# Patient Record
Sex: FEMALE | Race: WHITE | NOT HISPANIC OR LATINO | Employment: OTHER | URBAN - METROPOLITAN AREA
[De-identification: names, ages, dates, MRNs, and addresses within clinical notes are randomized per-mention and may not be internally consistent; named-entity substitution may affect disease eponyms.]

---

## 2017-01-04 ENCOUNTER — ANTICOAGULATION MONITORING (OUTPATIENT)
Dept: VASCULAR LAB | Facility: MEDICAL CENTER | Age: 77
End: 2017-01-04

## 2017-01-04 DIAGNOSIS — Z79.01 CHRONIC ANTICOAGULATION: ICD-10-CM

## 2017-01-04 LAB
INR PPP: 2.2 (ref 2–3.5)
INR PPP: 2.2 (ref 2–3.5)

## 2017-01-04 NOTE — PROGRESS NOTES
OP Anticoagulation Telephone Note    Date: 1/4/2017  Anticoagulation Summary as of 1/4/2017     INR goal 2.0-3.0   Selected INR 2.2 (1/4/2017)   Maintenance plan 3.75 mg (2.5 mg x 1.5) on Thu; 2.5 mg (2.5 mg x 1) all other days   Weekly total 18.75 mg   No change documented Roxana Mercedes, Med Ass't   Plan last modified Massiel Coleman, PHARMD (12/22/2016)   Next INR check 1/18/2017   Target end date Indefinite    Indications   Atrial fibrillation (HCC) (Resolved) [I48.91]  Chronic anticoagulation [Z79.01]         Anticoagulation Episode Summary     INR check location Home Draw    Preferred lab     Send INR reminders to     Bria Monteor       Anticoagulation Care Providers     Provider Role Specialty Phone number    Lizzy Haywood M.D. Referring Cardiology [phone]    [facility] Anticoagulation Services Responsible  [phone]    VIOLET FloresD Responsible          Anticoagulation Patient Findings   Negatives Missed Doses, Extra Doses, Medication Changes, Antibiotic Use, Diet Changes, Dental/Other Procedures, Hospitalization, Bleeding Gums, Nose Bleeds, Blood in Urine, Blood in Stool, Any Bruising, Other Complaints      Plan:  Spoke with patient on the phone. Patient is therapeutic today. Patient denies any changes in medications or diet. Patient denies any signs or symptoms of bleeding or clotting. Instructed patient to call clinic if any unusual bleeding or bruising occurs. Will continue dosing as outlined above. Will follow-up with patient in 2 weeks.    Roxana Mercedes, Medical Assistant    I have reviewed and agree with the plan above on 01/04/2017      Massiel Coleman, VIOLETD

## 2017-01-14 DIAGNOSIS — I48.20 CHRONIC ATRIAL FIBRILLATION (HCC): ICD-10-CM

## 2017-01-16 RX ORDER — SERTRALINE HYDROCHLORIDE 25 MG/1
TABLET, FILM COATED ORAL
Qty: 90 TAB | Refills: 1 | Status: SHIPPED | OUTPATIENT
Start: 2017-01-16 | End: 2017-12-05 | Stop reason: SDUPTHER

## 2017-01-16 RX ORDER — DIGOXIN 125 MCG
TABLET ORAL
Qty: 90 TAB | Refills: 3 | Status: SHIPPED | OUTPATIENT
Start: 2017-01-16 | End: 2017-12-05 | Stop reason: SDUPTHER

## 2017-01-18 ENCOUNTER — ANTICOAGULATION MONITORING (OUTPATIENT)
Dept: VASCULAR LAB | Facility: MEDICAL CENTER | Age: 77
End: 2017-01-18

## 2017-01-18 DIAGNOSIS — Z79.01 CHRONIC ANTICOAGULATION: ICD-10-CM

## 2017-01-18 LAB — INR PPP: 1.9 (ref 2–3.5)

## 2017-01-18 NOTE — PROGRESS NOTES
Anticoagulation Summary as of 1/18/2017     INR goal 2.0-3.0   Selected INR 1.9! (1/18/2017)   Maintenance plan 3.75 mg (2.5 mg x 1.5) on Thu; 2.5 mg (2.5 mg x 1) all other days   Weekly total 18.75 mg   Plan last modified VIOLET BarriosD (12/22/2016)   Next INR check 2/1/2017   Target end date Indefinite    Indications   Atrial fibrillation (CMS-HCC) (Resolved) [I48.91]  Chronic anticoagulation [Z79.01]         Anticoagulation Episode Summary     INR check location Home Draw    Preferred lab     Send INR reminders to     Bria Montero       Anticoagulation Care Providers     Provider Role Specialty Phone number    Lizzy Haywood M.D. Referring Cardiology [phone]    [facility] Anticoagulation Services Responsible  [phone]    Vladimir Taylor, VIOLETD Responsible          Anticoagulation Patient Findings    Spoke with patient today regarding subtherapeutic INR of 1.9.  Patient denies any signs/symptoms of bruising or bleeding or any changes in diet and medications.  Instructed patient to call clinic with any questions or concerns.  Instructed patient to bolus with 3.75mg X 1, then resume current warfarin regimen.  Follow up in 2 weeks.    Vladimir Taylor, GILMA

## 2017-01-27 ENCOUNTER — TELEPHONE (OUTPATIENT)
Dept: CARDIOLOGY | Facility: MEDICAL CENTER | Age: 77
End: 2017-01-27

## 2017-01-27 NOTE — TELEPHONE ENCOUNTER
Called patient regarding her MyChart message (below). Patient states that her feet and legs are swollen, but they have been that way for a long time and the swelling has not changed.    She is mostly concerned because her toes on both feet started tingling last night. She states that she doesn't have any nerve problems that she is aware of. Patient would like Dr. Haywood's advice regarding what she should do about the tingling.    Forwarded to Dr. Haywood, please advise. Thank you.    ENOCH RN  ========================================================  ----- Message -----      From: Donte Magaña      Sent: 1/27/2017   9:10 AM        To: Gita Roldan   Subject: Non-Urgent Medical Question                       WATER RETENTION   My feet and legs are swollen and tingling. I took 80 mg of Lasix at 5 AM.   How much and how often can I take more Lasix ?     Thanks   Donte Magaña   [phone]

## 2017-01-27 NOTE — TELEPHONE ENCOUNTER
If the swelling is chronic - ok to take an extra x1 lasix and elevate her feet today  If still bothersome - she can come in for abis next week    Hope that helps    Darren SANCHEZ

## 2017-01-30 RX ORDER — ESTRADIOL 2 MG/1
TABLET ORAL
Qty: 90 TAB | Refills: 1 | Status: SHIPPED | OUTPATIENT
Start: 2017-01-30 | End: 2017-07-29 | Stop reason: SDUPTHER

## 2017-01-30 NOTE — TELEPHONE ENCOUNTER
Requested Prescriptions     Signed Prescriptions Disp Refills   • estradiol (ESTRACE) 2 MG Tab 90 Tab 1     Sig: TAKE ONE TABLET BY MOUTH EVERY DAY     Authorizing Provider: SHELTON FALK     Ordering User: ROSE HILARIO   Patient has upcoming appointment on 2/9/17  VICTORIANO Urias

## 2017-01-31 NOTE — TELEPHONE ENCOUNTER
Called patient for an update. She states that the swelling in her legs and tingling in her toes has subsided. Advised patient to call the office with future concerns.    ENOCH DELGADO

## 2017-02-01 ENCOUNTER — ANTICOAGULATION MONITORING (OUTPATIENT)
Dept: VASCULAR LAB | Facility: MEDICAL CENTER | Age: 77
End: 2017-02-01

## 2017-02-01 DIAGNOSIS — Z79.01 CHRONIC ANTICOAGULATION: ICD-10-CM

## 2017-02-01 LAB — INR PPP: 2.2 (ref 2–3.5)

## 2017-02-01 NOTE — PROGRESS NOTES
Anticoagulation Summary as of 2/1/2017     INR goal 2.0-3.0   Selected INR 2.2 (2/1/2017)   Maintenance plan 3.75 mg (2.5 mg x 1.5) on Thu; 2.5 mg (2.5 mg x 1) all other days   Weekly total 18.75 mg   Plan last modified Massiel Coleman PHARMD (12/22/2016)   Next INR check 2/15/2017   Target end date Indefinite    Indications   Atrial fibrillation (CMS-HCC) (Resolved) [I48.91]  Chronic anticoagulation [Z79.01]         Anticoagulation Episode Summary     INR check location Home Draw    Preferred lab     Send INR reminders to     Bria Montero       Anticoagulation Care Providers     Provider Role Specialty Phone number    Lizzy Haywood M.D. Referring Cardiology [phone]    [facility] Anticoagulation Services Responsible  [phone]    Vladimir Taylor, GILMA Responsible          Anticoagulation Patient Findings    Spoke with patients  today regarding therapeutic INR of 2.2.  Patient denies any signs/symptoms of bruising or bleeding or any changes in diet and medications.  Instructed patient to call clinic with any questions or concerns.  Pt is to continue with current warfarin dosing regimen.  Follow up in 2 weeks.    Vladimir Taylor, GILMA

## 2017-02-09 ENCOUNTER — OFFICE VISIT (OUTPATIENT)
Dept: CARDIOLOGY | Facility: MEDICAL CENTER | Age: 77
End: 2017-02-09
Payer: MEDICARE

## 2017-02-09 VITALS
BODY MASS INDEX: 31.41 KG/M2 | HEART RATE: 116 BPM | WEIGHT: 184 LBS | OXYGEN SATURATION: 94 % | SYSTOLIC BLOOD PRESSURE: 150 MMHG | DIASTOLIC BLOOD PRESSURE: 70 MMHG | HEIGHT: 64 IN

## 2017-02-09 DIAGNOSIS — Z79.01 CHRONIC ANTICOAGULATION: ICD-10-CM

## 2017-02-09 DIAGNOSIS — I25.10 CORONARY ARTERY DISEASE INVOLVING NATIVE CORONARY ARTERY OF NATIVE HEART WITHOUT ANGINA PECTORIS: ICD-10-CM

## 2017-02-09 DIAGNOSIS — I05.1 RHEUMATIC MITRAL REGURGITATION: ICD-10-CM

## 2017-02-09 DIAGNOSIS — I48.21 PERMANENT ATRIAL FIBRILLATION (HCC): ICD-10-CM

## 2017-02-09 PROCEDURE — 1036F TOBACCO NON-USER: CPT | Performed by: INTERNAL MEDICINE

## 2017-02-09 PROCEDURE — 1101F PT FALLS ASSESS-DOCD LE1/YR: CPT | Mod: 8P | Performed by: INTERNAL MEDICINE

## 2017-02-09 PROCEDURE — G8432 DEP SCR NOT DOC, RNG: HCPCS | Performed by: INTERNAL MEDICINE

## 2017-02-09 PROCEDURE — 4040F PNEUMOC VAC/ADMIN/RCVD: CPT | Performed by: INTERNAL MEDICINE

## 2017-02-09 PROCEDURE — G8482 FLU IMMUNIZE ORDER/ADMIN: HCPCS | Performed by: INTERNAL MEDICINE

## 2017-02-09 PROCEDURE — G8419 CALC BMI OUT NRM PARAM NOF/U: HCPCS | Performed by: INTERNAL MEDICINE

## 2017-02-09 PROCEDURE — G8599 NO ASA/ANTIPLAT THER USE RNG: HCPCS | Performed by: INTERNAL MEDICINE

## 2017-02-09 PROCEDURE — 99214 OFFICE O/P EST MOD 30 MIN: CPT | Performed by: INTERNAL MEDICINE

## 2017-02-09 ASSESSMENT — ENCOUNTER SYMPTOMS
PALPITATIONS: 0
DIZZINESS: 0
INSOMNIA: 0
HEARTBURN: 0
EYES NEGATIVE: 1
MYALGIAS: 0
COUGH: 0
WEIGHT LOSS: 0
BRUISES/BLEEDS EASILY: 0
NERVOUS/ANXIOUS: 0
NAUSEA: 0
SHORTNESS OF BREATH: 0

## 2017-02-09 NOTE — PROGRESS NOTES
"Subjective:   Donte Magaña is a 76 y.o. female who presents today in follow-up in regards to her valvular heart disease, coronary disease and atrial fibrillation on anticoagulation and rate control    No cardiac complaints today, she does have some edema and it is better with an extra 80 mg of Lasix only notices that she has had one episode of atrial fibrillation, heart rates and blood pressures have been excellent    Past Medical History   Diagnosis Date   • Heart rate fast    • Backpain    • Hypertension    • Hypothyroid    • Glaucoma 5/3/2011   • High risk medication use 1/12/2012   • Anticoagulant long-term use 1/12/2012   • Obesity 1/12/2012   • Menopause 1/12/2012   • Indigestion    • Unspecified urinary incontinence    • Pulmonary hypertension (CMS-HCC) 10/30/2012   • Hematoma complicating a procedure 11/3/2012   • Unspecified disorder of thyroid      hypothyroidism   • Pneumonia feb,2013   • High cholesterol    • PVC's (premature ventricular contractions) 1/12/2012   • Anesthesia      \"Tachycardia for 5 days after cataract surgery\"   • Arthritis      Knees, hips   • Depression    • Macular degeneration    • Lupus (CMS-[facility])    • CAD (coronary artery disease)    • Spinal stenosis of lumbar region at multiple levels    • Bronchitis Nov, 2013   • Unspecified cataract      repaired bilateral   • Senile nuclear sclerosis    • Mitral regurgitation 10/30/2012   • A-fib (CMS-[facility])    • Asthma      with pneumonia, nothing for 3 years   • Breath shortness      with exertion O2  2l/m PRN, hasnt used for 3 years     Past Surgical History   Procedure Laterality Date   • Tonsillectomy and adenoidectomy     • Recovery  11/30/2010     Performed by SURGERY, CATH-RECOVERY at [facility]   • Colonoscopy  2008     Normal    [facility]   • Abdominal hysterectomy total  April 15,1975     still has ovaries   • Other       Removed pins from left ankle   • Cataract phaco with iol  4/21/2015     Performed by " "Dmitry Bejarano M.D. at [facility]   • Cataract phaco with iol Right 5/5/2015     Procedure: IOL OD - STANDARD;  Surgeon: Dmitry Bejarano M.D.;  Location: [facility];  Service:    • Open reduction       left ankle   • Knee arthroplasty total Left 5/28/2015     Procedure: KNEE ARTHROPLASTY TOTAL;  Surgeon: Heriberto Lozada M.D.;  Location: [facility];  Service:    • Knee arthroplasty total Right 6/23/2016     Procedure: KNEE ARTHROPLASTY TOTAL;  Surgeon: Heriberto Lozada M.D.;  Location: [facility];  Service:      Family History   Problem Relation Age of Onset   • Stroke Mother    • Diabetes Father    • Stroke Sister    • Heart Disease Brother    • Stroke Sister    • GI Daughter      Crohn's Disease   • Heart Disease Daughter      CHF   • Other Daughter      Chronic Pain--Lymphedema   • Cancer Paternal Aunt      History   Smoking status   • Never Smoker    Smokeless tobacco   • Never Used     Allergies   Allergen Reactions   • Amiodarone Hives     Throat and tongue itching   • Atorvastatin Calcium-Polysorbate 80      Muscle aches     • Augmentin      Unknown reaction   • Bactrim Shortness of Breath   • Cipro Xr Swelling   • Claritin-D [Loratadine-Pseudoephedrine] Palpitations   • Clotrimazole      Stinging / burning on chest   • Diltiazem      rash   • Flecainide      dizziness   • Keflex Unspecified     Pt states \"Unsure\".   • Lipitor [Atorvastatin Calcium]      Muscle aches   • Metoprolol      Causes throat swelling   • Morphine      Hallucinations   • Mucinex      GI Distress     • Qvar [Beclomethasone Dipropionate]      Pressure on heart     • Tape Rash     Paper tape okay   • Tramadol      crying   • Vibramycin Shortness of Breath     Outpatient Encounter Prescriptions as of 2/9/2017   Medication Sig Dispense Refill   • estradiol (ESTRACE) 2 MG Tab TAKE ONE TABLET BY MOUTH EVERY DAY 90 Tab 1   • digoxin (LANOXIN) 125 MCG Tab TAKE ONE TABLET BY MOUTH EVERY " DAY 90 Tab 3   • sertraline (ZOLOFT) 25 MG tablet TAKE ONE TABLET BY MOUTH EVERY DAY 90 Tab 1   • verapamil (ISOPTIN) 120 MG Tab Take 1 Tab by mouth See Admin Instructions. Take 240 mg every morning, take 180 mg at noon, take 180 mg at 8pm 450 Tab 2   • ranitidine (ZANTAC) 150 MG Tab Take 1 Tab by mouth 2 times a day. 180 Tab 3   • Multiple Vitamins-Minerals (ICAPS AREDS 2) Cap Take 1 Cap by mouth every day. (Patient taking differently: Take 1 Cap by mouth every day. Pt takes the chewable tabs) 100 Cap 3   • potassium chloride SA (K-DUR) 20 MEQ Tab CR Take 1 Tab by mouth 2 times a day. 180 Tab 3   • lisinopril (PRINIVIL) 5 MG Tab Take 1 Tab by mouth every day. 90 Tab 3   • albuterol 108 (90 BASE) MCG/ACT Aero Soln inhalation aerosol Inhale 2 Puffs by mouth every 6 hours as needed for Shortness of Breath.     • fexofenadine (ALLEGRA) 60 MG Tab Take 60 mg by mouth as needed. Indications: Hayfever     • furosemide (LASIX) 40 MG Tab Take 80 mg by mouth every day.     • levothyroxine (SYNTHROID) 88 MCG Tab Take 88 mcg by mouth Every morning on an empty stomach.     • docusate sodium (COLACE) 100 MG Cap Take 200 mg by mouth every evening.     • Diclofenac Sodium 1 % Gel Apply 2 g to affected area(s) as needed (For joint pain).     • albuterol (PROVENTIL) 2.5mg/3ml Nebu Soln solution for nebulization 2.5 mg by Nebulization route every four hours as needed for Shortness of Breath.     • acetaminophen (TYLENOL) 500 MG TABS Take 1,000 mg by mouth 3 times a day. Indications: Pain     • MAGOX 400 400 (241.3 MG) MG TABS tablet Take 400 mg by mouth every day.  3   • sennosides-docusate sodium (SENOKOT-S) 8.6-50 MG tablet Take 3 Tabs by mouth every day. 30 Tab    • vitamin D (CHOLECALCIFEROL) 1000 UNIT TABS Take 2,000 Units by mouth every day.       No facility-administered encounter medications on file as of 2/9/2017.     Review of Systems   Constitutional: Negative for weight loss and malaise/fatigue.   Eyes: Negative.   "  Respiratory: Negative for cough and shortness of breath.    Cardiovascular: Positive for leg swelling (very mild bilaterally). Negative for chest pain and palpitations.   Gastrointestinal: Negative for heartburn and nausea.   Musculoskeletal: Positive for joint pain. Negative for myalgias.        Still getting injections, no setbacks with her knees   Neurological: Negative for dizziness.   Endo/Heme/Allergies: Does not bruise/bleed easily.   Psychiatric/Behavioral: The patient is not nervous/anxious and does not have insomnia.    All other systems reviewed and are negative.       Objective:   /70 mmHg  Pulse 116  Ht 1.626 m (5' 4\")  Wt 83.462 kg (184 lb)  BMI 31.57 kg/m2  SpO2 94%  LMP 01/09/1975    Physical Exam   Constitutional: She is oriented to person, place, and time. She appears well-developed.   HENT:   Mouth/Throat: Mucous membranes are normal.   Eyes: Conjunctivae and EOM are normal.   Neck: No JVD present. No thyroid mass present.   Cardiovascular: Normal rate, regular rhythm and intact distal pulses.    Murmur heard.  2/6 systolic blowing murmur;  heart rate 85 on my exam   Pulmonary/Chest: Effort normal and breath sounds normal.   Musculoskeletal: Normal range of motion. She exhibits no edema.   Neurological: She is alert and oriented to person, place, and time. She has normal strength. She displays no tremor.   Skin: Skin is warm and dry.   Psychiatric: She has a normal mood and affect. Her behavior is normal.   Vitals reviewed.      Assessment:     1. Rheumatic mitral regurgitation     2. Chronic anticoagulation     3. Coronary artery disease involving native coronary artery of native heart without angina pectoris  Echocardiogram Comp w/o Cont   4. Permanent atrial fibrillation (CMS-[facility])         Medical Decision Making:  Today's Assessment / Status / Plan:     We reviewed her complex hospital chart, she has coronary disease that we've been managing medically because of her high " functionality and lack of symptoms. Normal nuclear test last year. We looked at her echocardiogram from 2 years ago     mild mitral regurgitation as above. Echo this summer    Atrial fibrillation, systemic anticoagulation, no history of stroke polypharmacy and multiple drug sensitivities, as usually spent more than 20 minutes going over her labs. Her blood pressure log is quite good although it is slightly high today RTC 6 months with echo and visit

## 2017-02-09 NOTE — MR AVS SNAPSHOT
"        Donte Magaña   2017 11:00 AM   Office Visit   MRN: [id number]    Department:  [facility]   Dept Phone:  [phone]    Description:  Female : 1940   Provider:  Lizzy Haywood M.D.           Reason for Visit     Follow-Up           Allergies as of 2017     Allergen Noted Reactions    Amiodarone 2013   Hives    Throat and tongue itching    Atorvastatin Calcium-Polysorbate 80 2015       Muscle aches      Augmentin 2008       Unknown reaction    Bactrim 2013   Shortness of Breath    Cipro Xr 2009   Swelling    Claritin-D [Loratadine-Pseudoephedrine] 2008   Palpitations    Clotrimazole 2013       Stinging / burning on chest    Diltiazem 10/07/2010       rash    Flecainide 2012       dizziness    Keflex 2008   Unspecified    Pt states \"Unsure\".    Lipitor [Atorvastatin Calcium] 2013       Muscle aches    Metoprolol 10/26/2012       Causes throat swelling    Morphine 2008       Hallucinations    Mucinex 02/15/2013       GI Distress      Qvar [Beclomethasone Dipropionate] 2013       Pressure on heart      Tape 2015   Rash    Paper tape okay    Tramadol 2015       crying    Vibramycin 2013   Shortness of Breath      You were diagnosed with     Rheumatic mitral regurgitation   [499528]       Chronic anticoagulation   [410061]       Coronary artery disease involving native coronary artery of native heart without angina pectoris   [1537518]       Permanent atrial fibrillation (CMS-[facility])   [298093]         Vital Signs     Blood Pressure Pulse Height Weight Body Mass Index Oxygen Saturation    150/70 mmHg 116 1.626 m (5' 4\") 83.462 kg (184 lb) 31.57 kg/m2 94%    Last Menstrual Period Smoking Status                1975 Never Smoker           Basic Information     Date Of Birth Sex Race Ethnicity Preferred Language    1940 Female White Non- English      Your appointments     Mar 08, 2017 "  3:00 PM   ANNUAL WELLNESS with Kishore Price M.D., [facility]    [facility] ([place])    [address] [id number] [phone]              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   5/3/2011 - Present    Glaucoma H40.9 Low  5/3/2011 - Present    Macular degeneration H35.30 Low  5/3/2011 - Present    PVC's (premature ventricular contractions) (Chronic) I49.3 Low  1/12/2012 - Present    Menopause Z78.0   1/12/2012 - Present    Personal history of actinic keratosis Z87.2   5/15/2012 - Present    Mitral regurgitation I34.0 Medium  10/30/2012 - Present    HTN (hypertension) I10 Low  2/16/2013 - Present    Hypothyroid E03.9 Low  2/16/2013 - Present    Chondromalacia patellae of left knee    7/2/2013 - Present    Spinal stenosis, multilevel M48.00 Medium  7/2/2013 - Present    Chronic anticoagulation Z79.01 High  11/14/2013 - Present    Hypothyroidism E03.9 Low  5/21/2014 - Present    CAD (coronary artery disease) I25.10 Medium  10/7/2014 - Present    Senile nuclear sclerosis H25.10   5/5/2015 - Present    Osteoarthrosis involving lower leg M17.9   5/28/2015 - Present    Depression F32.9 Low  7/30/2015 - Present    Pulmonary hypertension (CMS-HCC) I27.2   9/16/2015 - Present    Degenerative arthritis of knee, bilateral M17.0 High  6/23/2016 - Present    Permanent atrial fibrillation (CMS-HCC) I48.2   10/17/2016 - Present      Health Maintenance        Date Due Completion Dates    COLONOSCOPY 9/28/2018 (Originally 9/29/2015) 9/29/2005    IMM ZOSTER VACCINE 9/28/2018 (Originally 11/16/1998) ---    MAMMOGRAM 12/7/2017 12/7/2016, 11/12/2015, 11/10/2014, 10/14/2013, 5/30/2012, 5/27/2011, 5/7/2010, 5/7/2010, 3/16/2009, 3/16/2009, 3/10/2008, 2/28/2007, 2/27/2006, 2/25/2005    BONE DENSITY  5/11/2020 5/11/2015, 4/4/2013    IMM DTaP/Tdap/Td Vaccine (2 - Td) 5/15/2022 5/15/2012            Current Immunizations     13-VALENT PCV PREVNAR 11/3/2015    Influenza TIV (IM) 10/9/2014, 10/28/2012  9:06 PM, 10/15/2011, 10/3/2010  2:06 PM    Influenza Vaccine Adult HD 9/10/2016, 9/8/2015    Influenza Vaccine Quad Inj (Preserved) 10/8/2013    Pneumococcal polysaccharide vaccine (PPSV-23) 10/15/2009    Tdap Vaccine 5/15/2012      Below and/or attached are the medications your provider expects you to take. Review all of your home medications and newly ordered medications with your provider and/or pharmacist. Follow medication instructions as directed by your provider and/or pharmacist. Please keep your medication list with you and share with your provider. Update the information when medications are discontinued, doses are changed, or new medications (including over-the-counter products) are added; and carry medication information at all times in the event of emergency situations     Allergies:  AMIODARONE - Hives     ATORVASTATIN CALCIUM-POLYSORBATE 80 - (reactions not documented)     AUGMENTIN - (reactions not documented)     BACTRIM - Shortness of Breath     CIPRO XR - Swelling     CLARITIN-D - Palpitations     CLOTRIMAZOLE - (reactions not documented)     DILTIAZEM - (reactions not documented)     FLECAINIDE - (reactions not documented)     KEFLEX - Unspecified     LIPITOR - (reactions not documented)     METOPROLOL - (reactions not documented)     MORPHINE - (reactions not documented)     MUCINEX - (reactions not documented)     QVAR - (reactions not documented)     TAPE - Rash     TRAMADOL - (reactions not documented)     VIBRAMYCIN - Shortness of Breath               Medications  Valid as of: February 09, 2017 - 11:43 AM    Generic Name Brand Name Tablet Size Instructions for use    Acetaminophen (Tab) TYLENOL 500 MG Take 1,000 mg by mouth 3 times a day. Indications: Pain        Albuterol Sulfate (Aero Soln)  albuterol 108 (90 BASE) MCG/ACT Inhale 2 Puffs by mouth every 6 hours as needed for Shortness of Breath.        Albuterol Sulfate (Nebu Soln) PROVENTIL 2.5mg/3ml 2.5 mg by Nebulization route every four hours as needed for Shortness of Breath.        Cholecalciferol (Tab) cholecalciferol 1000 UNIT Take 2,000 Units by mouth every day.        Diclofenac Sodium (Gel) Diclofenac Sodium 1 % Apply 2 g to affected area(s) as needed (For joint pain).        Digoxin (Tab) LANOXIN 125 MCG TAKE ONE TABLET BY MOUTH EVERY DAY        Docusate Sodium (Cap) COLACE 100 MG Take 200 mg by mouth every evening.        Estradiol (Tab) ESTRACE 2 MG TAKE ONE TABLET BY MOUTH EVERY DAY        Fexofenadine HCl (Tab) ALLEGRA 60 MG Take 60 mg by mouth as needed. Indications: Hayfever        Furosemide (Tab) LASIX 40 MG Take 80 mg by mouth every day.        Levothyroxine Sodium (Tab) SYNTHROID 88 MCG Take 88 mcg by mouth Every morning on an empty stomach.        Lisinopril (Tab) PRINIVIL 5 MG Take 1 Tab by mouth every day.        Magnesium Oxide (Tab) MAGOX 400 400 (241.3 MG) MG Take 400 mg by mouth every day.        Multiple Vitamins-Minerals (Cap) ICAPS AREDS 2  Take 1 Cap by mouth every day.        Potassium Chloride Celina CR (Tab CR) Kdur 20 MEQ Take 1 Tab by mouth 2 times a day.        RaNITidine HCl (Tab) ZANTAC 150 MG Take 1 Tab by mouth 2 times a day.        Sennosides-Docusate Sodium (Tab) SENOKOT-S 8.6-50 MG Take 3 Tabs by mouth every day.        Sertraline HCl (Tab) ZOLOFT 25 MG TAKE ONE TABLET BY MOUTH EVERY DAY        Verapamil HCl (Tab) ISOPTIN 120 MG Take 1 Tab by mouth See Admin Instructions. Take 240 mg every morning, take 180 mg at noon, take 180 mg at 8pm        .                 Medicines prescribed today were sent to:     [id number] - [place], [place] - [address]    [address] [place] [address]    Phone: [phone] Fax: [phone]    Open 24 Hours?: No      Medication refill instructions:       If your  prescription bottle indicates you have medication refills left, it is not necessary to call your provider’s office. Please contact your pharmacy and they will refill your medication.    If your prescription bottle indicates you do not have any refills left, you may request refills at any time through one of the following ways: The online [employer] system (except Urgent Care), by calling your provider’s office, or by asking your pharmacy to contact your provider’s office with a refill request. Medication refills are processed only during regular business hours and may not be available until the next business day. Your provider may request additional information or to have a follow-up visit with you prior to refilling your medication.   *Please Note: Medication refills are assigned a new Rx number when refilled electronically. Your pharmacy may indicate that no refills were authorized even though a new prescription for the same medication is available at the pharmacy. Please request the medicine by name with the pharmacy before contacting your provider for a refill.        Your To Do List     Future Labs/Procedures Complete By Expires    Echocardiogram Comp w/o Cont  As directed 2/9/2018      Other Notes About Your Plan     Donte Alicea is an -MedTe patient of Dr. Price  Please Assess the following diagnosis:    -this patient is on chronic Coumadin therapy for treatment of atrial fib. ? Coagulation defect, code 286.9    -per OV of 6/25/15, patient was started on Zoloft for treatment of depression. ? Major depressive disorder, single episode, unspecified degree, code 296.20             MyChart Access Code: Activation code not generated  [facility]: Active

## 2017-02-15 ENCOUNTER — ANTICOAGULATION MONITORING (OUTPATIENT)
Dept: VASCULAR LAB | Facility: MEDICAL CENTER | Age: 77
End: 2017-02-15

## 2017-02-15 DIAGNOSIS — Z79.01 CHRONIC ANTICOAGULATION: ICD-10-CM

## 2017-02-15 LAB — INR PPP: 2.5 (ref 2–3.5)

## 2017-02-15 NOTE — PROGRESS NOTES
Anticoagulation Summary as of 2/15/2017     INR goal 2.0-3.0   Selected INR 2.5 (2/15/2017)   Maintenance plan 3.75 mg (2.5 mg x 1.5) on Thu; 2.5 mg (2.5 mg x 1) all other days   Weekly total 18.75 mg   Plan last modified VIOLET BarriosD (12/22/2016)   Next INR check 3/1/2017   Target end date Indefinite    Indications   Atrial fibrillation (CMS-HCC) (Resolved) [I48.91]  Chronic anticoagulation [Z79.01]         Anticoagulation Episode Summary     INR check location Home Draw    Preferred lab     Send INR reminders to     Bria Montero       Anticoagulation Care Providers     Provider Role Specialty Phone number    Lizzy Haywood M.D. Referring Cardiology [phone]    [facility]ticoagulation [facility] Responsible  [phone]    Vladimir Taylor, VIOLETD Responsible          Anticoagulation Patient Findings    Spoke with patient today regarding therapeutic INR of 2.5.  Patient denies any signs/symptoms of bruising or bleeding or any changes in diet and medications.  Instructed patient to call clinic with any questions or concerns.  Pt is to continue with current warfarin dosing regimen.  Follow up in 2 weeks.    Vladimir Taylor, VIOLETD

## 2017-02-24 DIAGNOSIS — Z79.01 CHRONIC ANTICOAGULATION: ICD-10-CM

## 2017-03-01 ENCOUNTER — ANTICOAGULATION MONITORING (OUTPATIENT)
Dept: VASCULAR LAB | Facility: MEDICAL CENTER | Age: 77
End: 2017-03-01

## 2017-03-01 DIAGNOSIS — Z79.01 CHRONIC ANTICOAGULATION: ICD-10-CM

## 2017-03-01 LAB — INR PPP: 2.3 (ref 2–3.5)

## 2017-03-01 NOTE — PROGRESS NOTES
Anticoagulation Summary as of 3/1/2017     INR goal 2.0-3.0   Selected INR 2.3 (3/1/2017)   Maintenance plan 3.75 mg (2.5 mg x 1.5) on Thu; 2.5 mg (2.5 mg x 1) all other days   Weekly total 18.75 mg   Plan last modified Massiel Coleman PHARMD (12/22/2016)   Next INR check 3/15/2017   Target end date Indefinite    Indications   Atrial fibrillation (CMS-HCC) (Resolved) [I48.91]  Chronic anticoagulation [Z79.01]         Anticoagulation Episode Summary     INR check location Home Draw    Preferred lab     Send INR reminders to     Bria Montero       Anticoagulation Care Providers     Provider Role Specialty Phone number    Lizzy Haywood M.D. Referring Cardiology [phone]    [facility] Responsible  [phone]    Vladimir Taylor, GILMA Responsible          Anticoagulation Patient Findings    Spoke with patients  Woody today regarding therapeutic INR of 2.3.  Patient denies any signs/symptoms of bruising or bleeding or any changes in diet and medications.  Instructed patient to call clinic with any questions or concerns.  Pt is to continue with current warfarin dosing regimen.  Follow up in 2 weeks.    Vladimir Taylor, GILMA

## 2017-03-06 ENCOUNTER — TELEPHONE (OUTPATIENT)
Dept: MEDICAL GROUP | Facility: PHYSICIAN GROUP | Age: 77
End: 2017-03-06

## 2017-03-06 NOTE — TELEPHONE ENCOUNTER
ANNUAL WELLNESS VISIT PRE-VISIT PLANNING     1.  Reviewed last PCP office visit assessment and plan notes: Yes    2.  If any orders were placed last visit do we have Results/Consult Notes?        •  Labs? Yes NOT COMPLETE       •  Imaging? No        •  Referrals? Yes 01/26/2017 COMPLETE CARDIOLOGY    3.  Patient Care Coordination Note was updated with diagnosis information:  Yes    4.  Patient is due for these Health Maintenance Topics:   Health Maintenance Due   Topic Date Due   • Annual Wellness Visit  1940          •  JOBY letter was faxed to:  NONE    5.  Immunizations were updated in [place] using WebIZ?: Yes       •  Web Iz Recommendations:  NONE       •  Is patient due for Tdap/Shingles? No.  If yes, was patient alerted of copay? NA    6.  Patient has:       •   Diabetes: NO       •   COPD: NO       •   CHF: NO       •   Depression: YES    7.  Updated Care Team with [employer] and all specialists?        •   Gait devices, O2, CPAP, etc: yes (WALKER + CANE)       •   Eye professional: yes       •   Other specialists (GYN, cardiology, endo, etc): N\A    8.  Is patient in need of any refills prior to office visit? No       •    Separate refill encounter created?: N\A    9.  Patient was informed to arrive 15 min prior to their scheduled appointment and bring in their medication bottles? yes    10.  Patient was advised: “This is a free wellness visit. The provider will screen for medical conditions to help you stay healthy. If you have other concerns to address you may be asked to discuss these at a separate visit or there may be an additional fee.”  Yes

## 2017-03-08 ENCOUNTER — OFFICE VISIT (OUTPATIENT)
Dept: MEDICAL GROUP | Facility: PHYSICIAN GROUP | Age: 77
End: 2017-03-08
Payer: MEDICARE

## 2017-03-08 VITALS
DIASTOLIC BLOOD PRESSURE: 60 MMHG | OXYGEN SATURATION: 96 % | TEMPERATURE: 97.5 F | WEIGHT: 180 LBS | HEART RATE: 80 BPM | HEIGHT: 64 IN | BODY MASS INDEX: 30.73 KG/M2 | SYSTOLIC BLOOD PRESSURE: 138 MMHG

## 2017-03-08 DIAGNOSIS — Z79.01 CHRONIC ANTICOAGULATION: ICD-10-CM

## 2017-03-08 DIAGNOSIS — I49.3 PVC'S (PREMATURE VENTRICULAR CONTRACTIONS): Chronic | ICD-10-CM

## 2017-03-08 DIAGNOSIS — M17.0 PRIMARY OSTEOARTHRITIS OF BOTH KNEES: ICD-10-CM

## 2017-03-08 DIAGNOSIS — L30.9 ECZEMA, UNSPECIFIED TYPE: ICD-10-CM

## 2017-03-08 DIAGNOSIS — I25.10 CORONARY ARTERY DISEASE INVOLVING NATIVE CORONARY ARTERY OF NATIVE HEART WITHOUT ANGINA PECTORIS: ICD-10-CM

## 2017-03-08 DIAGNOSIS — E66.9 OBESITY (BMI 30-39.9): ICD-10-CM

## 2017-03-08 PROCEDURE — G8432 DEP SCR NOT DOC, RNG: HCPCS | Performed by: FAMILY MEDICINE

## 2017-03-08 PROCEDURE — 1101F PT FALLS ASSESS-DOCD LE1/YR: CPT | Performed by: FAMILY MEDICINE

## 2017-03-08 PROCEDURE — G8419 CALC BMI OUT NRM PARAM NOF/U: HCPCS | Performed by: FAMILY MEDICINE

## 2017-03-08 PROCEDURE — G0439 PPPS, SUBSEQ VISIT: HCPCS | Performed by: FAMILY MEDICINE

## 2017-03-08 PROCEDURE — G8598 ASA/ANTIPLAT THER USED: HCPCS | Performed by: FAMILY MEDICINE

## 2017-03-08 PROCEDURE — 1036F TOBACCO NON-USER: CPT | Performed by: FAMILY MEDICINE

## 2017-03-08 PROCEDURE — 4040F PNEUMOC VAC/ADMIN/RCVD: CPT | Performed by: FAMILY MEDICINE

## 2017-03-08 PROCEDURE — G8482 FLU IMMUNIZE ORDER/ADMIN: HCPCS | Performed by: FAMILY MEDICINE

## 2017-03-08 RX ORDER — FEXOFENADINE HCL 60 MG/1
60 TABLET, FILM COATED ORAL PRN
Qty: 180 TAB | Refills: 3 | Status: ON HOLD | OUTPATIENT
Start: 2017-03-08 | End: 2017-12-13

## 2017-03-08 RX ORDER — WARFARIN SODIUM 2.5 MG/1
2.5 TABLET ORAL DAILY
COMMUNITY
End: 2017-11-20 | Stop reason: SDUPTHER

## 2017-03-08 ASSESSMENT — PATIENT HEALTH QUESTIONNAIRE - PHQ9: CLINICAL INTERPRETATION OF PHQ2 SCORE: 0

## 2017-03-08 ASSESSMENT — PAIN SCALES - GENERAL: PAINLEVEL: NO PAIN

## 2017-03-08 NOTE — PROGRESS NOTES
Chief Complaint   Patient presents with   • Annual Wellness Visit         HPI:  Donte is a 76 y.o. female here for Medicare Annual Wellness Visit--patient complains of ongoing itching and a rash on her trunk and legs. She like to see an allergist. It looks to me like she has eczema.  The patient is otherwise doing reasonably well. She is working on losing weight and I encouraged her to continue to do so.        Patient Active Problem List    Diagnosis Date Noted   • Degenerative arthritis of knee, bilateral 06/23/2016     Priority: High   • Chronic anticoagulation 11/14/2013     Priority: High   • CAD (coronary artery disease) 10/07/2014     Priority: Medium   • Spinal stenosis, multilevel 07/02/2013     Priority: Medium   • Mitral regurgitation 10/30/2012     Priority: Medium   • Depression 07/30/2015     Priority: Low   • Hypothyroidism 05/21/2014     Priority: Low   • HTN (hypertension) 02/16/2013     Priority: Low   • Hypothyroid 02/16/2013     Priority: Low   • PVC's (premature ventricular contractions) 01/12/2012     Priority: Low   • Glaucoma 05/03/2011     Priority: Low   • Macular degeneration 05/03/2011     Priority: Low   • Lupus (systemic lupus erythematosus) (CMS-HCC) 10/23/2009     Priority: Low   • Mixed hyperlipidemia 10/23/2009     Priority: Low   • Permanent atrial fibrillation (CMS-HCC) 10/17/2016   • Pulmonary hypertension (CMS-HCC) 09/16/2015   • Osteoarthrosis involving lower leg 05/28/2015   • Senile nuclear sclerosis 05/05/2015   • 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/02/2013   • Personal history of actinic keratosis 05/15/2012   • Menopause 01/12/2012   • Eosinophilic disorder 05/03/2011       Current Outpatient Prescriptions   Medication Sig Dispense Refill   • warfarin (COUMADIN) 2.5 MG Tab Take 2.5 mg by mouth every day. Patient reports: Takes one 2.5 mg Sunday Monday Tuesday Wednesday Friday Saturday and on Thursday take 1 1/2 tab     • estradiol (ESTRACE) 2 MG Tab TAKE ONE TABLET BY  MOUTH EVERY DAY 90 Tab 1   • digoxin (LANOXIN) 125 MCG Tab TAKE ONE TABLET BY MOUTH EVERY DAY 90 Tab 3   • sertraline (ZOLOFT) 25 MG tablet TAKE ONE TABLET BY MOUTH EVERY DAY 90 Tab 1   • verapamil (ISOPTIN) 120 MG Tab Take 1 Tab by mouth See Admin Instructions. Take 240 mg every morning, take 180 mg at noon, take 180 mg at 8pm 450 Tab 2   • ranitidine (ZANTAC) 150 MG Tab Take 1 Tab by mouth 2 times a day. 180 Tab 3   • potassium chloride SA (K-DUR) 20 MEQ Tab CR Take 1 Tab by mouth 2 times a day. 180 Tab 3   • lisinopril (PRINIVIL) 5 MG Tab Take 1 Tab by mouth every day. 90 Tab 3   • albuterol 108 (90 BASE) MCG/ACT Aero Soln inhalation aerosol Inhale 2 Puffs by mouth every 6 hours as needed for Shortness of Breath.     • fexofenadine (ALLEGRA) 60 MG Tab Take 60 mg by mouth as needed. Indications: Hayfever     • furosemide (LASIX) 40 MG Tab Take 80 mg by mouth every day.     • levothyroxine (SYNTHROID) 88 MCG Tab Take 88 mcg by mouth Every morning on an empty stomach.     • docusate sodium (COLACE) 100 MG Cap Take 200 mg by mouth every evening.     • Diclofenac Sodium 1 % Gel Apply 2 g to affected area(s) as needed (For joint pain).     • albuterol (PROVENTIL) 2.5mg/3ml Nebu Soln solution for nebulization 2.5 mg by Nebulization route every four hours as needed for Shortness of Breath.     • acetaminophen (TYLENOL) 500 MG TABS Take 1,000 mg by mouth 3 times a day. Indications: Pain     • MAGOX 400 400 (241.3 MG) MG TABS tablet Take 400 mg by mouth every day.  3   • sennosides-docusate sodium (SENOKOT-S) 8.6-50 MG tablet Take 3 Tabs by mouth every day. 30 Tab    • vitamin D (CHOLECALCIFEROL) 1000 UNIT TABS Take 2,000 Units by mouth every day.     • Multiple Vitamins-Minerals (ICAPS AREDS 2) Cap Take 1 Cap by mouth every day. (Patient taking differently: Take 1 Cap by mouth every day. Pt takes the chewable tabs) 100 Cap 3     No current facility-administered medications for this visit.        The patient reports  adherence to this regimen yes    Current supplements as per medication list.   Chronic narcotic pain medicines: no    Allergies: Amiodarone; Atorvastatin calcium-polysorbate 80; Augmentin; Bactrim; Cipro xr; Claritin-d; Clotrimazole; Diltiazem; Flecainide; Keflex; Lipitor; Metoprolol; Morphine; Mucinex; Qvar; Tape; Tramadol; and Vibramycin    Current social contact/activities: go to the [employer],      Is patient current with immunizations?  yes   If no, due for none    She  reports that she has never smoked. She has never used smokeless tobacco. She reports that she does not drink alcohol or use illicit drugs.  Counseling given: Yes        DPA/Advanced Directive:  Patient do not have an advanced directive.  If not on file, instructed to bring in a copy to scan into her chart. If no advanced directive exists, a packet and workshop information was provided    ROS:    Gait: Uses a cane and walker  Ostomy: no   Other tubes: no   Amputations: no   Chronic oxygen use no   Last eye exam march 2017   : Reports incontinence. --Intermittent    Depression Screening    Little interest or pleasure in doing things?  0 - not at all  Feeling down, depressed, or hopeless?  0 - not at all  Patient Health Questionnaire Score: 0    If depressive symptoms identified deferred to follow up visit unless specifically addressed in assessment and plan.    Screening for Cognitive Impairment    Three Minute Recall (banana, sunrise, fence)  2/3 Orange fence grass  Draw clock face with all 12 numbers set to the hand to show 10 minutes past 11 o'clock  1 5/5  Cognitive concerns identified deferred for follow up unless specifically addressed in assessment and plan.    Fall Risk Assessment    Has the patient had two or more falls in the last year or any fall with injury in the last year?  No    Safety Assessment    Throw rugs on floor.  Yes  Handrails on all stairs.  No  Good lighting in all hallways.  Yes  Difficulty hearing.  No  Patient counseled  about all safety risks that were identified.    Functional Assessment ADLs    Are there any barriers preventing you from cooking for yourself or meeting nutritional needs?  No.    Are there any barriers preventing you from driving safely or obtaining transportation?  No.    Are there any barriers preventing you from using a telephone or calling for help?  No.    Are there any barriers preventing you from shopping?  No.    Are there any barriers preventing you from taking care of your own finances?  No.    Are there any barriers preventing you from managing your medications?  No.    Are currently engaging any exercise or physical activity?  No.       Health Maintenance Summary                Annual Wellness Visit Overdue 1940     COLONOSCOPY Postponed 9/28/2018 Originally 9/29/2015. Patient Refused     Done 9/29/2005 AMB REFERRAL TO GI FOR COLONOSCOPY    IMM ZOSTER VACCINE Postponed 9/28/2018 Originally 11/16/1998. Patient Refused    MAMMOGRAM Next Due 12/7/2017      Done 12/7/2016 MA-MAMMO SCREENING BILAT W/TOMOSYNTHESIS W/CAD     Patient has more history with this topic...    BONE DENSITY Next Due 5/11/2020      Done 5/11/2015 DS-BONE DENSITY STUDY (DEXA)     Patient has more history with this topic...    IMM DTaP/Tdap/Td Vaccine Next Due 5/15/2022      Done 5/15/2012 Imm Admin: Tdap Vaccine          Patient Care Team:  Kishore Price M.D. as PCP - General  [facility] Anticoagulation Services  Dmitry Bejarano M.D. as Consulting Physician (Ophthalmology)    Social History   Substance Use Topics   • Smoking status: Never Smoker    • Smokeless tobacco: Never Used   • Alcohol Use: No     Family History   Problem Relation Age of Onset   • Stroke Mother    • Diabetes Father    • Stroke Sister    • Heart Disease Brother    • Stroke Sister    • GI Daughter      Crohn's Disease   • Heart Disease Daughter      CHF   • Other Daughter      Chronic Pain--Lymphedema   • Cancer Paternal Aunt      She  has a past  "medical history of Heart rate fast; Backpain; Hypertension; Hypothyroid; Glaucoma (5/3/2011); High risk medication use (1/12/2012); Anticoagulant long-term use (1/12/2012); Obesity (1/12/2012); Menopause (1/12/2012); Indigestion; Unspecified urinary incontinence; Pulmonary hypertension (CMS-HCC) (10/30/2012); Hematoma complicating a procedure (11/3/2012); Unspecified disorder of thyroid; Pneumonia (feb,2013); High cholesterol; PVC's (premature ventricular contractions) (1/12/2012); Anesthesia; Arthritis; Depression; Macular degeneration; Lupus (CMS-HCC); CAD (coronary artery disease); Spinal stenosis of lumbar region at multiple levels; Bronchitis (Nov, 2013); Unspecified cataract; Senile nuclear sclerosis; Mitral regurgitation (10/30/2012); A-fib (CMS-HCC); Asthma; and Breath shortness.   Past Surgical History   Procedure Laterality Date   • Tonsillectomy and adenoidectomy     • Recovery  11/30/2010     Performed by SURGERY, CATH-RECOVERY at SURGERY SAME DAY [facility]   • Colonoscopy  2008     Normal    [facility]   • Abdominal hysterectomy total  April 15,1975     still has ovaries   • Other       Removed pins from left ankle   • Cataract phaco with iol  4/21/2015     Performed by Dmitry Bejarano M.D. at [facility]   • Cataract phaco with iol Right 5/5/2015     Procedure: IOL OD - STANDARD;  Surgeon: Dmitry Bejarano M.D.;  Location: [facility];  Service:    • Open reduction       left ankle   • Knee arthroplasty total Left 5/28/2015     Procedure: KNEE ARTHROPLASTY TOTAL;  Surgeon: Heriberto Lozada M.D.;  Location: [facility];  Service:    • Knee arthroplasty total Right 6/23/2016     Procedure: KNEE ARTHROPLASTY TOTAL;  Surgeon: Heriberto Lozada M.D.;  Location: [facility];  Service:            Exam:     Blood pressure 138/60, pulse 80, temperature 36.4 °C (97.5 °F), height 1.626 m (5' 4.02\"), weight 81.647 kg (180 lb), last menstrual period " 01/09/1975, SpO2 96 %, not currently breastfeeding. Body mass index is 30.88 kg/(m^2).    Hearing good.    Dentition good  Alert, oriented in no acute distress.  Eye contact is good, speech goal directed, affect calm    Physical Exam   Constitutional: She is oriented. She appears well-developed and well-nourished. No distress.   HENT:  Head: Normocephalic and atraumatic.   Right Ear: External ear normal. Ear canal and TM normal   Left Ear: External ear normal. Ear canal and TM normal  Nose: Nose normal.   Mouth/Throat: Oropharynx is clear and moist.   Eyes: Conjunctivae and extraocular motions are normal. Pupils are equal, round, and reactive to light. Fundi benign bilaterally   Neck: No thyromegaly present.   Cardiovascular: Normal rate, regular rhythm, normal heart sounds and intact distal pulses.  Exam reveals no gallop.    No murmur heard.  Pulmonary/Chest: Effort normal and breath sounds normal. No respiratory distress. She has no wheezes. She has no rales.   Abdominal: Soft. Bowel sounds are normal. No hepatosplenomegaly She exhibits no distension. No tenderness. She has no rebound and no guarding.   Musculoskeletal: Normal range of motion. She exhibits no edema and no tenderness.   Lymphadenopathy:     She has no cervical adenopathy.   No supraclavicular adenopathy  Neurological: She is alert and oriented. She has normal reflexes.        Babinskis downgoing bilaterally   Skin: Skin is warm and dry. She has what appears to be eczema over her trunk and legs and arms  Psychiatric: She has a normal mood and appropriate affect. Her behavior is normal. Judgment and thought content normal.     Assessment and Plan. The following treatment and monitoring plan is recommended:    1. Eczema, unspecified type  fexofenadine (ALLEGRA) 60 MG Tab--one by mouth twice a day     REFERRAL TO ALLERGY--Dr. Redd    2. Chronic anticoagulation   doing well.    3. Primary osteoarthritis of both knees   stable    4. Coronary artery  disease involving native coronary artery of native heart without angina pectoris   stable    5. PVC's (premature ventricular contractions)   asymptomatic    6. Obesity (BMI 30-39.9)  Patient identified as having weight management issue.  Appropriate orders and counseling given.           Services needed: No services needed at this time  Health Care Screening recommendations as per orders if indicated.  Referrals offered: PT/OT/Nutrition counseling/Behavioral Health/Smoking cessation as per orders if indicated.    Discussion today about general wellness and lifestyle habits:    · Prevent falls and reduce trip hazards; Cautioned about securing or removing rugs.  · Have a working fire alarm and carbon monoxide detector;   · Engage in regular physical activity and social activities   · Stay healthy and active (goals)      Follow-up: Recheck one year or when necessary    Please note that this dictation was created using voice recognition software. I have worked with consultants from the vendor as well as technical experts from [facility] [employer] to optimize the interface. I have made every reasonable attempt to correct obvious errors, but I expect that there are errors of grammar and possibly content that I did not discover before finalizing the note.

## 2017-03-09 NOTE — PATIENT INSTRUCTIONS
Patient given written instructions regarding labs, imaging, medications, referrals, dietary and lifestyle management, and return visit.    Kishore Price MD

## 2017-03-15 ENCOUNTER — ANTICOAGULATION MONITORING (OUTPATIENT)
Dept: VASCULAR LAB | Facility: MEDICAL CENTER | Age: 77
End: 2017-03-15

## 2017-03-15 DIAGNOSIS — Z79.01 CHRONIC ANTICOAGULATION: ICD-10-CM

## 2017-03-15 LAB — INR PPP: 2.3 (ref 2–3.5)

## 2017-03-15 NOTE — PROGRESS NOTES
OP Anticoagulation Telephone Note    Date: 3/15/2017  Anticoagulation Summary as of 3/15/2017     INR goal 2.0-3.0   Selected INR 2.3 (3/15/2017)   Maintenance plan 3.75 mg (2.5 mg x 1.5) on Thu; 2.5 mg (2.5 mg x 1) all other days   Weekly total 18.75 mg   No change documented Linus Cooley Ass't   Plan last modified PETER BarriosD (12/22/2016)   Next INR check 3/29/2017   Target end date Indefinite    Indications   Atrial fibrillation (CMS-HCC) (Resolved) [I48.91]  Chronic anticoagulation [Z79.01]         Anticoagulation Episode Summary     INR check location Home Draw    Preferred lab     Send INR reminders to     Bria Montero       Anticoagulation Care Providers     Provider Role Specialty Phone number    Lizzy Haywood M.D. Referring Cardiology [phone]    [facility] Anticoagulation [facility] Responsible  [phone]    PETER FloresD Responsible          Anticoagulation Patient Findings   Negatives Missed Doses, Extra Doses, Medication Changes, Antibiotic Use, Diet Changes, Dental/Other Procedures, Hospitalization, Bleeding Gums, Nose Bleeds, Blood in Urine, Blood in Stool, Any Bruising, Other Complaints      Plan:  Spoke with patient on the phone. Patient is therapeutic today. Patient denies any changes in medications or diet. Patient denies any signs or symptoms of bleeding or clotting. Instructed patient to call clinic if any unusual bleeding or bruising occurs. Will continue dosing as outlined above. Will follow-up with patient in 2 weeks.    Roxana Mercedes, Medical Assistant    3/24/2017    Concur with plan outlined above    Wayne Cheng, PeterD

## 2017-03-30 LAB — INR PPP: 2 (ref 2–3.5)

## 2017-04-05 ENCOUNTER — ANTICOAGULATION MONITORING (OUTPATIENT)
Dept: VASCULAR LAB | Facility: MEDICAL CENTER | Age: 77
End: 2017-04-05

## 2017-04-05 DIAGNOSIS — Z79.01 CHRONIC ANTICOAGULATION: ICD-10-CM

## 2017-04-05 NOTE — PROGRESS NOTES
Anticoagulation Summary as of 4/5/2017     INR goal 2.0-3.0   Selected INR 2.0 (3/30/2017)   Maintenance plan 3.75 mg (2.5 mg x 1.5) on Thu; 2.5 mg (2.5 mg x 1) all other days   Weekly total 18.75 mg   Plan last modified VIOLET BarriosD (12/22/2016)   Next INR check 4/13/2017   Target end date Indefinite    Indications   Atrial fibrillation (CMS-HCC) (Resolved) [I48.91]  Chronic anticoagulation [Z79.01]         Anticoagulation Episode Summary     INR check location Home Draw    Preferred lab     Send INR reminders to     Bria Montero       Anticoagulation Care Providers     Provider Role Specialty Phone number    Lizzy Haywood M.D. Referring Cardiology [phone]    [facility] Responsible  [phone]    Vladimir Taylor PHARMD Responsible          Anticoagulation Patient Findings   Negatives Missed Doses, Extra Doses, Medication Changes, Antibiotic Use, Diet Changes, Dental/Other Procedures, Hospitalization, Bleeding Gums, Nose Bleeds, Blood in Urine, Blood in Stool, Any Bruising, Other Complaints        Spoke with patient today regarding  therapeutic INR of 2.0.  Patient denies any signs/symptoms of bruising or bleeding or any changes in diet and medications.  Instructed patient to call clinic with any questions or concerns.  Pt is to continue with current warfarin dosing regimen.  Follow up in 2 weeks.    Vladimir Taylor PHARMD

## 2017-04-12 ENCOUNTER — ANTICOAGULATION MONITORING (OUTPATIENT)
Dept: VASCULAR LAB | Facility: MEDICAL CENTER | Age: 77
End: 2017-04-12

## 2017-04-12 DIAGNOSIS — Z79.01 CHRONIC ANTICOAGULATION: ICD-10-CM

## 2017-04-12 LAB — INR PPP: 1.8 (ref 2–3.5)

## 2017-04-12 NOTE — PROGRESS NOTES
Anticoagulation Summary as of 4/12/2017     INR goal 2.0-3.0   Selected INR 1.8! (4/12/2017)   Maintenance plan 3.75 mg (2.5 mg x 1.5) on Thu; 2.5 mg (2.5 mg x 1) all other days   Weekly total 18.75 mg   Plan last vera Coleman, PHARMD (12/22/2016)   Next INR check    Target end date Indefinite    Indications   Atrial fibrillation (CMS-HCC) (Resolved) [I48.91]  Chronic anticoagulation [Z79.01]         Anticoagulation Episode Summary     INR check location Home Draw    Preferred lab     Send INR reminders to     Bria Montero       Anticoagulation Care Providers     Provider Role Specialty Phone number    Lizzy Haywood M.D. Referring Cardiology [phone]    [facility] Anticoagulation Services Responsible  [phone]    PETER FloresD Responsible          Anticoagulation Patient Findings    Spoke to patient on the phone. Patients INR was subtherapeutic today at 1.8 .Patient denied any signs/symptoms of bleeding or bruising. Patient denied any recent changes to medications but she stated she has been eating more cranberry sauce which most likely will increase INR not decrease it. Patient denied any missed doses. Patient was instructed to Take 3.75 mg today only (1.5X2.5mg) then continue regular regimen. Patient has a CHADS-VASc of 5/ CHADS of 3 will not consider bridging at this time . Follow up in 2 week(s).      Ruy Fontaine, Peter.D

## 2017-04-26 ENCOUNTER — ANTICOAGULATION MONITORING (OUTPATIENT)
Dept: VASCULAR LAB | Facility: MEDICAL CENTER | Age: 77
End: 2017-04-26

## 2017-04-26 DIAGNOSIS — Z79.01 CHRONIC ANTICOAGULATION: ICD-10-CM

## 2017-04-26 LAB — INR PPP: 1.2 (ref 2–3.5)

## 2017-04-26 NOTE — PROGRESS NOTES
Anticoagulation Summary as of 4/26/2017     INR goal 2.0-3.0   Selected INR 1.2! (4/26/2017)   Maintenance plan 3.75 mg (2.5 mg x 1.5) on Thu; 2.5 mg (2.5 mg x 1) all other days   Weekly total 18.75 mg   Plan last modified VIOLET BarriosD (12/22/2016)   Next INR check 5/1/2017   Target end date Indefinite    Indications   Atrial fibrillation (CMS-HCC) (Resolved) [I48.91]  Chronic anticoagulation [Z79.01]         Anticoagulation Episode Summary     INR check location Home Draw    Preferred lab     Send INR reminders to     Comments Winston       Anticoagulation Care Providers     Provider Role Specialty Phone number    Lizzy Haywood M.D. Referring Cardiology [phone]    [facility] Responsible  [phone]    Vladimir Taylor, VIOLETD Responsible          Anticoagulation Patient Findings   Positives Diet Changes    Negatives Missed Doses, Extra Doses, Medication Changes, Antibiotic Use, Dental/Other Procedures, Hospitalization, Bleeding Gums, Nose Bleeds, Blood in Urine, Blood in Stool, Any Bruising, Other Complaints    Comments Was eating a lot of cabbage soup over the last week        Spoke with patient today regarding subtherapeutic INR of 1.2.  Patient denies any signs/symptoms of bruising or bleeding or any changes in diet and medications.  Instructed patient to call clinic with any questions or concerns.  She was eating a lot of cabbage soup of the last week, which likely led to her low INR.  Instructed patient to bolus with 5mg X 2, then resume current warfarin regimen.  Follow up in 5 days.    Vladimir Taylor PHARMD

## 2017-05-01 ENCOUNTER — ANTICOAGULATION MONITORING (OUTPATIENT)
Dept: VASCULAR LAB | Facility: MEDICAL CENTER | Age: 77
End: 2017-05-01

## 2017-05-01 DIAGNOSIS — Z79.01 CHRONIC ANTICOAGULATION: ICD-10-CM

## 2017-05-01 LAB — INR PPP: 1.6 (ref 2–3.5)

## 2017-05-01 RX ORDER — MAGNESIUM OXIDE 400 MG/1
TABLET ORAL
Qty: 90 TAB | Refills: 3 | Status: SHIPPED | OUTPATIENT
Start: 2017-05-01 | End: 2018-03-08

## 2017-05-01 NOTE — PROGRESS NOTES
Anticoagulation Summary as of 5/1/2017     INR goal 2.0-3.0   Selected INR 1.6! (5/1/2017)   Maintenance plan 3.75 mg (2.5 mg x 1.5) on Thu; 2.5 mg (2.5 mg x 1) all other days   Weekly total 18.75 mg   Plan last modified Massiel Coleman, PHARMD (12/22/2016)   Next INR check 5/8/2017   Target end date Indefinite    Indications   Atrial fibrillation (CMS-HCC) (Resolved) [I48.91]  Chronic anticoagulation [Z79.01]         Anticoagulation Episode Summary     INR check location Home Draw    Preferred lab     Send INR reminders to     Bria Montero       Anticoagulation Care Providers     Provider Role Specialty Phone number    Lizzy Haywood M.D. Referring Cardiology [phone]    [facility] Responsible  [phone]    Vladimir Taylor, VIOLETD Responsible          Anticoagulation Patient Findings    Spoke with pt on the phone. Pt is subtherapeutic today. Denies any changes in medications or diet. Patient denies any s/sx of bleeding or clotting. Will increase tonight's dose then continue dosing as outlined in calendar. Will follow-up with pt in 1 week.    Estelita FERGUSON

## 2017-05-08 ENCOUNTER — ANTICOAGULATION MONITORING (OUTPATIENT)
Dept: VASCULAR LAB | Facility: MEDICAL CENTER | Age: 77
End: 2017-05-08

## 2017-05-08 DIAGNOSIS — Z79.01 CHRONIC ANTICOAGULATION: ICD-10-CM

## 2017-05-08 LAB — INR PPP: 1.5 (ref 2–3.5)

## 2017-05-08 NOTE — PROGRESS NOTES
Anticoagulation Summary as of 5/8/2017     INR goal 2.0-3.0   Selected INR 1.5! (5/8/2017)   Maintenance plan 3.75 mg (2.5 mg x 1.5) on Tue, Thu, Sat; 2.5 mg (2.5 mg x 1) all other days   Weekly total 21.25 mg   Plan last modified Vladimir Taylor PHARMD (5/8/2017)   Next INR check 5/15/2017   Target end date Indefinite    Indications   Atrial fibrillation (CMS-HCC) (Resolved) [I48.91]  Chronic anticoagulation [Z79.01]         Anticoagulation Episode Summary     INR check location Home Draw    Preferred lab     Send INR reminders to     Bria Montero       Anticoagulation Care Providers     Provider Role Specialty Phone number    Lizzy Haywood M.D. Referring Cardiology [phone]    [facility] Responsible  [phone]    Vladimir Taylor PHARMD Responsible          Anticoagulation Patient Findings   Negatives Missed Doses, Extra Doses, Medication Changes, Antibiotic Use, Diet Changes, Dental/Other Procedures, Hospitalization, Bleeding Gums, Nose Bleeds, Blood in Urine, Blood in Stool, Any Bruising, Other Complaints        Spoke with patient today regarding subtherapeutic INR of 1.5.  Patient denies any signs/symptoms of bruising or bleeding or any changes in diet and medications.  Instructed patient to call clinic with any questions or concerns.  Instructed patient to bolus with 5mg X 1, then increase weekly warfarin regimen by ~13% as detailed above.  Follow up in 1 weeks.    Vladimir Taylor PHARMD

## 2017-05-15 ENCOUNTER — ANTICOAGULATION MONITORING (OUTPATIENT)
Dept: VASCULAR LAB | Facility: MEDICAL CENTER | Age: 77
End: 2017-05-15

## 2017-05-15 DIAGNOSIS — Z79.01 CHRONIC ANTICOAGULATION: ICD-10-CM

## 2017-05-15 LAB — INR PPP: 1.9 (ref 2–3.5)

## 2017-05-15 RX ORDER — FUROSEMIDE 40 MG/1
TABLET ORAL
Qty: 180 TAB | Refills: 3 | Status: SHIPPED | OUTPATIENT
Start: 2017-05-15 | End: 2017-07-26 | Stop reason: SDUPTHER

## 2017-05-15 NOTE — PROGRESS NOTES
Anticoagulation Summary as of 5/15/2017     INR goal 2.0-3.0   Selected INR 1.9! (5/15/2017)   Maintenance plan 2.5 mg (2.5 mg x 1) on Sun, Tue, Thu; 3.75 mg (2.5 mg x 1.5) all other days   Weekly total 22.5 mg   Plan last modified Vladimir Taylor PHARMD (5/15/2017)   Next INR check 5/22/2017   Target end date Indefinite    Indications   Atrial fibrillation (CMS-HCC) (Resolved) [I48.91]  Chronic anticoagulation [Z79.01]         Anticoagulation Episode Summary     INR check location Home Draw    Preferred lab     Send INR reminders to     Bria Montero       Anticoagulation Care Providers     Provider Role Specialty Phone number    Lizzy Haywood M.D. Referring Cardiology [phone]    [facility]  [phone]    Vladimir Taylor PHARMD Responsible          Anticoagulation Patient Findings    Spoke with patients  today regarding slightly subtherapeutic INR of 1.9.  Patient denies any signs/symptoms of bruising or bleeding or any changes in diet and medications.  Instructed patient to call clinic with any questions or concerns.  Instructed patient to increase weekly warfarin regimen by ~6% as detailed above.  Follow up in 1 weeks.    Vladimir Taylor PHARMD

## 2017-05-22 ENCOUNTER — ANTICOAGULATION MONITORING (OUTPATIENT)
Dept: VASCULAR LAB | Facility: MEDICAL CENTER | Age: 77
End: 2017-05-22

## 2017-05-22 DIAGNOSIS — Z79.01 CHRONIC ANTICOAGULATION: ICD-10-CM

## 2017-05-22 LAB — INR PPP: 1.7 (ref 2–3.5)

## 2017-05-22 NOTE — PROGRESS NOTES
Anticoagulation Summary as of 5/22/2017     INR goal 2.0-3.0   Selected INR 1.7! (5/22/2017)   Maintenance plan 2.5 mg (2.5 mg x 1) on Sun, Tue, Thu; 3.75 mg (2.5 mg x 1.5) all other days   Weekly total 22.5 mg   Plan last modified Vladimir Taylor PHARMD (5/15/2017)   Next INR check 5/25/2017   Target end date Indefinite    Indications   Atrial fibrillation (CMS-HCC) (Resolved) [I48.91]  Chronic anticoagulation [Z79.01]         Anticoagulation Episode Summary     INR check location Home Draw    Preferred lab     Send INR reminders to     Bria Montero       Anticoagulation Care Providers     Provider Role Specialty Phone number    Lizzy Haywood M.D. Referring Cardiology [phone]    [facility] Responsible  [phone]    Vladimir Taylor PHARMD Responsible          Anticoagulation Patient Findings   Negatives Missed Doses, Extra Doses, Medication Changes, Antibiotic Use, Diet Changes, Dental/Other Procedures, Hospitalization, Bleeding Gums, Nose Bleeds, Blood in Urine, Blood in Stool, Any Bruising, Other Complaints        Spoke with patient today regarding subtherapeutic INR of 1.7.  Patient denies any signs/symptoms of bruising or bleeding or any changes in diet and medications.  Instructed patient to call clinic with any questions or concerns.  INR's have run low for several weeks despite bolusing and dose changes.  She had several concerns about her machine and error readings that she has been seeing.  We agreed that a visit to clinic to evaluate machine and readings is warranted.  She will bolus with 5mg X 1, then resume current warfarin regimen.  Follow up in 3 days.    Vladimir Taylor PHARMD

## 2017-05-25 ENCOUNTER — ANTICOAGULATION VISIT (OUTPATIENT)
Dept: VASCULAR LAB | Facility: MEDICAL CENTER | Age: 77
End: 2017-05-25
Attending: INTERNAL MEDICINE
Payer: MEDICARE

## 2017-05-25 DIAGNOSIS — Z79.01 CHRONIC ANTICOAGULATION: ICD-10-CM

## 2017-05-25 LAB — INR PPP: 1.8 (ref 2–3.5)

## 2017-05-25 PROCEDURE — 99212 OFFICE O/P EST SF 10 MIN: CPT

## 2017-05-25 PROCEDURE — 85610 PROTHROMBIN TIME: CPT

## 2017-05-25 NOTE — MR AVS SNAPSHOT
"        Donte Mayorgaeth   2017 9:00 AM   Anticoagulation Visit   MRN: [id number]    Department:  Vascular Medicine   Dept Phone:  [phone]    Description:  Female : 1940   Provider:  [facility] EXAM 5           Allergies as of 2017     Allergen Noted Reactions    Amiodarone 2013   Hives    Throat and tongue itching    Atorvastatin Calcium-Polysorbate 80 2015       Muscle aches      Augmentin 2008       Unknown reaction    Bactrim 2013   Shortness of Breath    Cipro Xr 2009   Swelling    Claritin-D [Loratadine-Pseudoephedrine] 2008   Palpitations    Clotrimazole 2013       Stinging / burning on chest    Diltiazem 10/07/2010       rash    Flecainide 2012       dizziness    Keflex 2008   Unspecified    Pt states \"Unsure\".    Lipitor [Atorvastatin Calcium] 2013       Muscle aches    Metoprolol 10/26/2012       Causes throat swelling    Morphine 2008       Hallucinations    Mucinex 02/15/2013       GI Distress      Qvar [Beclomethasone Dipropionate] 2013       Pressure on heart      Tape 2015   Rash    Paper tape okay    Tramadol 2015       crying    Vibramycin 2013   Shortness of Breath      You were diagnosed with     Chronic anticoagulation   [977558]         Vital Signs     Last Menstrual Period Smoking Status                1975 Never Smoker           Basic Information     Date Of Birth Sex Race Ethnicity Preferred Language    1940 Female White Non- English      Your appointments     2017 12:15 PM   ECHO with ECHO [facility], [facility] EXAM 12   [facility]OLOGY [facility]    [address] [address]   [phone]           No prep              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   " 5/3/2011 - Present    Glaucoma H40.9 Low  5/3/2011 - Present    Macular degeneration H35.30 Low  5/3/2011 - Present    PVC's (premature ventricular contractions) (Chronic) I49.3 Low  1/12/2012 - Present    Menopause Z78.0   1/12/2012 - Present    Personal history of actinic keratosis Z87.2   5/15/2012 - Present    Mitral regurgitation I34.0 Medium  10/30/2012 - Present    HTN (hypertension) I10 Low  2/16/2013 - Present    Hypothyroid E03.9 Low  2/16/2013 - Present    Chondromalacia patellae of left knee    7/2/2013 - Present    Spinal stenosis, multilevel M48.00 Medium  7/2/2013 - Present    Chronic anticoagulation Z79.01 High  11/14/2013 - Present    Hypothyroidism E03.9 Low  5/21/2014 - Present    CAD (coronary artery disease) I25.10 Medium  10/7/2014 - Present    Senile nuclear sclerosis H25.10   5/5/2015 - Present    Osteoarthrosis involving lower leg M17.10   5/28/2015 - Present    Depression F32.9 Low  7/30/2015 - Present    Pulmonary hypertension (CMS-HCC) I27.2   9/16/2015 - Present    Degenerative arthritis of knee, bilateral M17.0 High  6/23/2016 - Present    Permanent atrial fibrillation (CMS-[facility]) I48.2   10/17/2016 - Present    Obesity (BMI 30-39.9) E66.9   3/8/2017 - Present      Health Maintenance        Date Due Completion Dates    COLONOSCOPY 9/28/2018 (Originally 9/29/2015) 9/29/2005    IMM ZOSTER VACCINE 9/28/2018 (Originally 11/16/1998) ---    MAMMOGRAM 12/7/2017 12/7/2016, 11/12/2015, 11/10/2014, 10/14/2013, 5/30/2012, 5/27/2011, 5/7/2010, 5/7/2010, 3/16/2009, 3/16/2009, 3/10/2008, 2/28/2007, 2/27/2006, 2/25/2005    BONE DENSITY 5/11/2020 5/11/2015, 4/4/2013    IMM DTaP/Tdap/Td Vaccine (2 - Td) 5/15/2022 5/15/2012            Results     POCT Protime      Component    INR    1.8                        Current Immunizations     13-VALENT PCV PREVNAR 11/3/2015    Influenza TIV (IM) 10/9/2014, 10/28/2012  9:06 PM, 10/15/2011, 10/3/2010  2:06 PM    Influenza Vaccine Adult HD 9/10/2016, 9/8/2015     Influenza Vaccine Quad Inj (Preserved) 10/8/2013    Pneumococcal polysaccharide vaccine (PPSV-23) 10/15/2009    Tdap Vaccine 5/15/2012      Below and/or attached are the medications your provider expects you to take. Review all of your home medications and newly ordered medications with your provider and/or pharmacist. Follow medication instructions as directed by your provider and/or pharmacist. Please keep your medication list with you and share with your provider. Update the information when medications are discontinued, doses are changed, or new medications (including over-the-counter products) are added; and carry medication information at all times in the event of emergency situations     Allergies:  AMIODARONE - Hives     ATORVASTATIN CALCIUM-POLYSORBATE 80 - (reactions not documented)     AUGMENTIN - (reactions not documented)     BACTRIM - Shortness of Breath     CIPRO XR - Swelling     CLARITIN-D - Palpitations     CLOTRIMAZOLE - (reactions not documented)     DILTIAZEM - (reactions not documented)     FLECAINIDE - (reactions not documented)     KEFLEX - Unspecified     LIPITOR - (reactions not documented)     METOPROLOL - (reactions not documented)     MORPHINE - (reactions not documented)     MUCINEX - (reactions not documented)     QVAR - (reactions not documented)     TAPE - Rash     TRAMADOL - (reactions not documented)     VIBRAMYCIN - Shortness of Breath               Medications  Valid as of: May 25, 2017 -  9:03 AM    Generic Name Brand Name Tablet Size Instructions for use    Acetaminophen (Tab) TYLENOL 500 MG Take 1,000 mg by mouth 3 times a day. Indications: Pain        Albuterol Sulfate (Aero Soln) albuterol 108 (90 BASE) MCG/ACT Inhale 2 Puffs by mouth every 6 hours as needed for Shortness of Breath.        Albuterol Sulfate (Nebu Soln) PROVENTIL 2.5mg/3ml 2.5 mg by Nebulization route every four hours as needed for Shortness of Breath.        Cholecalciferol (Tab) cholecalciferol 1000 UNIT  Take 2,000 Units by mouth every day.        Diclofenac Sodium (Gel) Diclofenac Sodium 1 % Apply 2 g to affected area(s) as needed (For joint pain).        Digoxin (Tab) LANOXIN 125 MCG TAKE ONE TABLET BY MOUTH EVERY DAY        Docusate Sodium (Cap) COLACE 100 MG Take 200 mg by mouth every evening.        Estradiol (Tab) ESTRACE 2 MG TAKE ONE TABLET BY MOUTH EVERY DAY        Fexofenadine HCl (Tab) ALLEGRA 60 MG Take 1 Tab by mouth as needed. For allergies  Indications: Hayfever        Furosemide (Tab) LASIX 40 MG TAKE TWO TABLETS BY MOUTH DAILY        Levothyroxine Sodium (Tab) SYNTHROID 88 MCG Take 88 mcg by mouth Every morning on an empty stomach.        Lisinopril (Tab) PRINIVIL 5 MG Take 1 Tab by mouth every day.        Magnesium Oxide (Tab) MAGOX 400 400 (241.3 MG) MG Take 400 mg by mouth every day.        Magnesium Oxide (Tab) MAG- MG TAKE ONE TABLET BY MOUTH EVERY DAY        Multiple Vitamins-Minerals (Cap) ICAPS AREDS 2  Take 1 Cap by mouth every day.        Potassium Chloride Celina CR (Tab CR) Kdur 20 MEQ Take 1 Tab by mouth 2 times a day.        RaNITidine HCl (Tab) ZANTAC 150 MG Take 1 Tab by mouth 2 times a day.        Sennosides-Docusate Sodium (Tab) SENOKOT-S 8.6-50 MG Take 3 Tabs by mouth every day.        Sertraline HCl (Tab) ZOLOFT 25 MG TAKE ONE TABLET BY MOUTH EVERY DAY        Verapamil HCl (Tab) ISOPTIN 120 MG Take 1 Tab by mouth See Admin Instructions. Take 240 mg every morning, take 180 mg at noon, take 180 mg at 8pm        Warfarin Sodium (Tab) COUMADIN 2.5 MG Take 2.5 mg by mouth every day. Patient reports: Takes one 2.5 mg Sunday Monday Tuesday Wednesday Friday Saturday and on Thursday take 1 1/2 tab        .                 Medicines prescribed today were sent to:     [id number] - [place], [place] - [address]    [address] [address]    Phone: [phone] Fax: [phone]    Open 24 Hours?: No      Medication refill instructions:       If your prescription  bottle indicates you have medication refills left, it is not necessary to call your provider’s office. Please contact your pharmacy and they will refill your medication.    If your prescription bottle indicates you do not have any refills left, you may request refills at any time through one of the following ways: The online "[employer] system (except Urgent Care), by calling your provider’s office, or by asking your pharmacy to contact your provider’s office with a refill request. Medication refills are processed only during regular business hours and may not be available until the next business day. Your provider may request additional information or to have a follow-up visit with you prior to refilling your medication.   *Please Note: Medication refills are assigned a new Rx number when refilled electronically. Your pharmacy may indicate that no refills were authorized even though a new prescription for the same medication is available at the pharmacy. Please request the medicine by name with the pharmacy before contacting your provider for a refill.        Other Notes About Your Plan     Donte Alicea is an [facility] patient of Dr. Price  Please Assess the following diagnosis:    -this patient is on chronic Coumadin therapy for treatment of atrial fib. ? Coagulation defect, code 286.9    -per OV of 6/25/15, patient was started on Zoloft for treatment of depression. ? Major depressive disorder, single episode, unspecified degree, code 296.20          Warfarin Dosing Calendar   May 2017 Details    Sun Mon Tue Wed Thu Fri Sat      1               2               3               4               5               6                 7               8               9               10               11               12               13                 14               15               16               17               18               19               20                 21               22               23               24                25   1.8   3.75 mg   See details      26      3.75 mg         27      3.75 mg           28      3.75 mg         29      3.75 mg         30      3.75 mg         31      3.75 mg             Date Details   05/25 This INR check   INR: 1.8               How to take your warfarin dose     To take:  3.75 mg Take 1.5 of the 2.5 mg tablets.           Warfarin Dosing Calendar   June 2017 Details    Sun Mon Tue Wed Thu Fri Sat         1      3.75 mg         2               3                 4               5               6               7               8               9               10                 11               12               13               14               15               16               17                 18               19               20               21               22               23               24                 25               26               27               28               29               30                 Date Details   No additional details    Date of next INR:  6/1/2017         How to take your warfarin dose     To take:  3.75 mg Take 1.5 of the 2.5 mg tablets.              [employer] Access Code: Activation code not generated  Current [employer] Status: Active

## 2017-05-25 NOTE — PROGRESS NOTES
Anticoagulation Summary as of 5/25/2017     INR goal 2.0-3.0   Selected INR 1.8! (5/25/2017)   Maintenance plan 3.75 mg (2.5 mg x 1.5) every day   Weekly total 26.25 mg   Plan last modified Fran Leyva, PHARMD (5/25/2017)   Next INR check 6/1/2017   Target end date Indefinite    Indications   Atrial fibrillation (CMS-HCC) (Resolved) [I48.91]  Chronic anticoagulation [Z79.01]         Anticoagulation Episode Summary     INR check location Home Draw    Preferred lab     Send INR reminders to     Bria Montero       Anticoagulation Care Providers     Provider Role Specialty Phone number    Lizzy Haywood M.D. Referring Cardiology [phone]    [facility] Responsible  [phone]    Vladimir Taylor, VIOLETD Responsible          Anticoagulation Patient Findings      Current Outpatient Prescriptions on File Prior to Visit   Medication Sig Dispense Refill   • furosemide (LASIX) 40 MG Tab TAKE TWO TABLETS BY MOUTH DAILY 180 Tab 3   • magnesium oxide (MAG-OX) 400 MG Tab TAKE ONE TABLET BY MOUTH EVERY DAY 90 Tab 3   • warfarin (COUMADIN) 2.5 MG Tab Take 2.5 mg by mouth every day. Patient reports: Takes one 2.5 mg Sunday Monday Tuesday Wednesday Friday Saturday and on Thursday take 1 1/2 tab     • fexofenadine (ALLEGRA) 60 MG Tab Take 1 Tab by mouth as needed. For allergies  Indications: Hayfever 180 Tab 3   • estradiol (ESTRACE) 2 MG Tab TAKE ONE TABLET BY MOUTH EVERY DAY 90 Tab 1   • digoxin (LANOXIN) 125 MCG Tab TAKE ONE TABLET BY MOUTH EVERY DAY 90 Tab 3   • sertraline (ZOLOFT) 25 MG tablet TAKE ONE TABLET BY MOUTH EVERY DAY 90 Tab 1   • verapamil (ISOPTIN) 120 MG Tab Take 1 Tab by mouth See Admin Instructions. Take 240 mg every morning, take 180 mg at noon, take 180 mg at 8pm 450 Tab 2   • ranitidine (ZANTAC) 150 MG Tab Take 1 Tab by mouth 2 times a day. 180 Tab 3   • Multiple Vitamins-Minerals (ICAPS AREDS 2) Cap Take 1 Cap by mouth every day. (Patient taking differently: Take 1 Cap by  mouth every day. Pt takes the chewable tabs) 100 Cap 3   • potassium chloride SA (K-DUR) 20 MEQ Tab CR Take 1 Tab by mouth 2 times a day. 180 Tab 3   • lisinopril (PRINIVIL) 5 MG Tab Take 1 Tab by mouth every day. 90 Tab 3   • albuterol 108 (90 BASE) MCG/ACT Aero Soln inhalation aerosol Inhale 2 Puffs by mouth every 6 hours as needed for Shortness of Breath.     • levothyroxine (SYNTHROID) 88 MCG Tab Take 88 mcg by mouth Every morning on an empty stomach.     • docusate sodium (COLACE) 100 MG Cap Take 200 mg by mouth every evening.     • Diclofenac Sodium 1 % Gel Apply 2 g to affected area(s) as needed (For joint pain).     • albuterol (PROVENTIL) 2.5mg/3ml Nebu Soln solution for nebulization 2.5 mg by Nebulization route every four hours as needed for Shortness of Breath.     • acetaminophen (TYLENOL) 500 MG TABS Take 1,000 mg by mouth 3 times a day. Indications: Pain     • MAGOX 400 400 (241.3 MG) MG TABS tablet Take 400 mg by mouth every day.  3   • sennosides-docusate sodium (SENOKOT-S) 8.6-50 MG tablet Take 3 Tabs by mouth every day. 30 Tab    • vitamin D (CHOLECALCIFEROL) 1000 UNIT TABS Take 2,000 Units by mouth every day.       No current facility-administered medications on file prior to visit.       Lab Results   Component Value Date/Time    SODIUM 136 11/01/2016 11:34 AM    POTASSIUM 4.1 11/01/2016 11:34 AM    CHLORIDE 99 11/01/2016 11:34 AM    CO2 26 11/01/2016 11:34 AM    GLUCOSE 101* 06/14/2016 11:10 AM    BUN 17 11/01/2016 11:34 AM    CREATININE 0.98 11/01/2016 11:34 AM    BUN-CREATININE RATIO 17 07/29/2010 12:00 AM    GLOM FILT RATE, EST >59 07/29/2010 12:00 AM          Hurlburt Fieldtaiwo Alicea Gómez seen in clinic today  INR  sub-therapeutic.  Also tested her home monitor and got a INR of 1.8  Denies signs/symptoms of bleeding and/or thrombosis.    Denies changes to diet or medications.   Follow up appointment in 1 week(s) at home.     Increase weekly warfarin dose as noted     Fran Leyva, PHARMD

## 2017-05-30 ENCOUNTER — ANTICOAGULATION MONITORING (OUTPATIENT)
Dept: VASCULAR LAB | Facility: MEDICAL CENTER | Age: 77
End: 2017-05-30

## 2017-05-30 DIAGNOSIS — Z79.01 CHRONIC ANTICOAGULATION: ICD-10-CM

## 2017-05-30 LAB — INR BLD: 1.8 (ref 0.9–1.2)

## 2017-06-01 ENCOUNTER — ANTICOAGULATION MONITORING (OUTPATIENT)
Dept: VASCULAR LAB | Facility: MEDICAL CENTER | Age: 77
End: 2017-06-01

## 2017-06-01 DIAGNOSIS — Z79.01 CHRONIC ANTICOAGULATION: ICD-10-CM

## 2017-06-01 LAB — INR PPP: 1.8 (ref 2–3.5)

## 2017-06-01 NOTE — PROGRESS NOTES
Anticoagulation Summary as of 6/1/2017     INR goal 2.0-3.0   Selected INR 1.8! (6/1/2017)   Maintenance plan 5 mg (2.5 mg x 2) on Mon, Fri; 3.75 mg (2.5 mg x 1.5) all other days   Weekly total 28.75 mg   Plan last modified Juan Blanco, PHARMD (6/1/2017)   Next INR check 6/7/2017   Target end date Indefinite    Indications   Atrial fibrillation (CMS-HCC) (Resolved) [I48.91]  Chronic anticoagulation [Z79.01]         Anticoagulation Episode Summary     INR check location Home Draw    Preferred lab     Send INR reminders to     Bria Montero       Anticoagulation Care Providers     Provider Role Specialty Phone number    Lizzy Haywood M.D. Referring Cardiology [phone]    [facility] Responsible  [phone]    VIOLET FloresD Responsible          Anticoagulation Patient Findings    Patient's INR was SUB therapeutic.   Denies any unusual s/s of bleeding, bruising, clotting.  Denies any changes to:   Diet   Medications  Confirmed dosing regimen.    Pt is to bolus then begin 10% increased warfarin dosing regimen.    Follow up in 1 week(s)    Juan Blanco, VIOLETD

## 2017-06-02 ENCOUNTER — TELEPHONE (OUTPATIENT)
Dept: CARDIOLOGY | Facility: MEDICAL CENTER | Age: 77
End: 2017-06-02

## 2017-06-02 ENCOUNTER — HOSPITAL ENCOUNTER (OUTPATIENT)
Dept: CARDIOLOGY | Facility: MEDICAL CENTER | Age: 77
End: 2017-06-02
Attending: INTERNAL MEDICINE
Payer: MEDICARE

## 2017-06-02 DIAGNOSIS — I25.10 CORONARY ARTERY DISEASE INVOLVING NATIVE CORONARY ARTERY OF NATIVE HEART WITHOUT ANGINA PECTORIS: ICD-10-CM

## 2017-06-02 LAB
LV EJECT FRACT  99904: 60
LV EJECT FRACT MOD 2C 99903: 57.96
LV EJECT FRACT MOD 4C 99902: 70.34
LV EJECT FRACT MOD BP 99901: 67.21

## 2017-06-02 PROCEDURE — 93306 TTE W/DOPPLER COMPLETE: CPT

## 2017-06-02 PROCEDURE — 93306 TTE W/DOPPLER COMPLETE: CPT | Mod: 26 | Performed by: INTERNAL MEDICINE

## 2017-06-02 NOTE — TELEPHONE ENCOUNTER
----- Message from Lizzy Haywood M.D. sent at 6/2/2017  3:46 PM PDT -----  Echo normal & reassuring  Great news

## 2017-06-02 NOTE — TELEPHONE ENCOUNTER
Called patient and advised her of Dr. Haywood's echocardiogram interpretation. Patient is thankful for the call.    ENOCH DELGADO

## 2017-06-05 RX ORDER — LEVOTHYROXINE SODIUM 88 UG/1
TABLET ORAL
Qty: 90 TAB | Refills: 3 | Status: ON HOLD | OUTPATIENT
Start: 2017-06-05 | End: 2017-12-22

## 2017-06-07 ENCOUNTER — ANTICOAGULATION MONITORING (OUTPATIENT)
Dept: VASCULAR LAB | Facility: MEDICAL CENTER | Age: 77
End: 2017-06-07

## 2017-06-07 DIAGNOSIS — Z79.01 CHRONIC ANTICOAGULATION: ICD-10-CM

## 2017-06-07 LAB — INR PPP: 2.8 (ref 2–3.5)

## 2017-06-07 NOTE — PROGRESS NOTES
Anticoagulation Summary as of 6/7/2017     INR goal 2.0-3.0   Selected INR 2.8 (6/7/2017)   Maintenance plan 5 mg (2.5 mg x 2) on Mon, Fri; 3.75 mg (2.5 mg x 1.5) all other days   Weekly total 28.75 mg   Plan last modified Juan Blanco PHARMD (6/1/2017)   Next INR check 6/14/2017   Target end date Indefinite    Indications   Atrial fibrillation (CMS-HCC) (Resolved) [I48.91]  Chronic anticoagulation [Z79.01]         Anticoagulation Episode Summary     INR check location Home Draw    Preferred lab     Send INR reminders to     Comments Winston       Anticoagulation Care Providers     Provider Role Specialty Phone number    Lizzy Haywood M.D. Referring Cardiology [phone]    [facility] Responsible  [phone]    Vladimir Taylor PHARMD Responsible          Anticoagulation Patient Findings    Patient's INR was therapeutic.   Denies any unusual s/s of bleeding, bruising, clotting.  Denies any changes to:   Diet   Medications  Confirmed dosing regimen. Pt is a poor historian, however it sounds like she did follow the dosing.   Pt is to continue with current warfarin dosing regimen.    Follow up in 1 week    Juan Blanco PHARMD     **Pt called later this day and stated she is only taking 2 tabs on Mon and Fri, then 1 tablet all other days.  This is much less than calendar reflects.  Pt is certain this is how she has been taking the medication, therefore asked pt to continue with the current regimen and check INR in 1 week.  I suspect pt has only been taking one tablet daily in the past whenever she has been instructed to take 1.5 tablets. This may help explain prior low INR readings.    Juan Blanco, VIOLETD

## 2017-06-14 ENCOUNTER — ANTICOAGULATION MONITORING (OUTPATIENT)
Dept: VASCULAR LAB | Facility: MEDICAL CENTER | Age: 77
End: 2017-06-14

## 2017-06-14 DIAGNOSIS — Z79.01 CHRONIC ANTICOAGULATION: ICD-10-CM

## 2017-06-14 LAB — INR PPP: 2.4 (ref 2–3.5)

## 2017-06-14 NOTE — PROGRESS NOTES
Anticoagulation Summary as of 6/14/2017     INR goal 2.0-3.0   Selected INR 2.4 (6/14/2017)   Maintenance plan 5 mg (2.5 mg x 2) on Mon, Fri; 2.5 mg (2.5 mg x 1) all other days   Weekly total 22.5 mg   Plan last modified Juan Blanco, PHARMD (6/7/2017)   Next INR check 6/28/2017   Target end date Indefinite    Indications   Atrial fibrillation (CMS-HCC) (Resolved) [I48.91]  Chronic anticoagulation [Z79.01]         Anticoagulation Episode Summary     INR check location Home Draw    Preferred lab     Send INR reminders to     Comments Winston       Anticoagulation Care Providers     Provider Role Specialty Phone number    Lizzy Haywood M.D. Referring Cardiology [phone]    [facility] Anticoagulation Services Responsible  [phone]    VIOLET FloresD Responsible          Anticoagulation Patient Findings    Left voicemail message to report a therapeutic INR of 2.4.  Pt to continue with current warfarin dosing regimen. Requested pt contact the clinic for any s/s of unusual bleeding, bruising, clotting or any changes to diet or medication. FU 2 weeks.    Juan Blanco, PHARMD

## 2017-06-20 DIAGNOSIS — N95.8 OTHER SPECIFIED MENOPAUSAL AND POSTMENOPAUSAL DISORDER: ICD-10-CM

## 2017-06-24 DIAGNOSIS — I10 ESSENTIAL HYPERTENSION: ICD-10-CM

## 2017-06-25 RX ORDER — LISINOPRIL 5 MG/1
5 TABLET ORAL DAILY
Qty: 90 TAB | Refills: 2 | OUTPATIENT
Start: 2017-06-25 | End: 2018-03-24 | Stop reason: SDUPTHER

## 2017-06-28 ENCOUNTER — ANTICOAGULATION MONITORING (OUTPATIENT)
Dept: VASCULAR LAB | Facility: MEDICAL CENTER | Age: 77
End: 2017-06-28

## 2017-06-28 DIAGNOSIS — Z79.01 CHRONIC ANTICOAGULATION: ICD-10-CM

## 2017-06-28 LAB — INR PPP: 2.9 (ref 2–3.5)

## 2017-06-28 NOTE — PROGRESS NOTES
Anticoagulation Summary as of 6/28/2017     INR goal 2.0-3.0   Selected INR 2.9 (6/28/2017)   Maintenance plan 5 mg (2.5 mg x 2) on Mon, Fri; 2.5 mg (2.5 mg x 1) all other days   Weekly total 22.5 mg   Plan last modified Juan Blanco, PHARMD (6/7/2017)   Next INR check 7/12/2017   Target end date Indefinite    Indications   Atrial fibrillation (CMS-HCC) (Resolved) [I48.91]  Chronic anticoagulation [Z79.01]         Anticoagulation Episode Summary     INR check location Home Draw    Preferred lab     Send INR reminders to     Bria Montero       Anticoagulation Care Providers     Provider Role Specialty Phone number    Lizzy Haywood M.D. Referring Cardiology [phone]    [facility] Anticoagulation Services Responsible  [phone]    VIOLET FloresD Responsible          Anticoagulation Patient Findings    Spoke with patient to report a therapeutic INR.    Pt instructed to continue with current warfarin dosing regimen.   Pt denies any s/s of bleeding, bruising, clotting or any changes to diet or medication.    Will follow up in 2 weeks.    Kelli Ma, PHARMD

## 2017-07-05 DIAGNOSIS — I10 ESSENTIAL HYPERTENSION: ICD-10-CM

## 2017-07-05 DIAGNOSIS — E78.2 MIXED HYPERLIPIDEMIA: ICD-10-CM

## 2017-07-05 DIAGNOSIS — I25.10 CORONARY ARTERY DISEASE INVOLVING NATIVE CORONARY ARTERY OF NATIVE HEART WITHOUT ANGINA PECTORIS: ICD-10-CM

## 2017-07-05 DIAGNOSIS — E03.9 ACQUIRED HYPOTHYROIDISM: ICD-10-CM

## 2017-07-10 ENCOUNTER — HOSPITAL ENCOUNTER (OUTPATIENT)
Dept: LAB | Facility: MEDICAL CENTER | Age: 77
End: 2017-07-10
Attending: FAMILY MEDICINE
Payer: MEDICARE

## 2017-07-10 DIAGNOSIS — I25.10 CORONARY ARTERY DISEASE INVOLVING NATIVE CORONARY ARTERY OF NATIVE HEART WITHOUT ANGINA PECTORIS: ICD-10-CM

## 2017-07-10 DIAGNOSIS — E78.2 MIXED HYPERLIPIDEMIA: ICD-10-CM

## 2017-07-10 DIAGNOSIS — E03.9 ACQUIRED HYPOTHYROIDISM: ICD-10-CM

## 2017-07-10 DIAGNOSIS — I10 ESSENTIAL HYPERTENSION: ICD-10-CM

## 2017-07-10 LAB
25(OH)D3 SERPL-MCNC: 34 NG/ML (ref 30–100)
ALBUMIN SERPL BCP-MCNC: 4.1 G/DL (ref 3.2–4.9)
ALBUMIN/GLOB SERPL: 1.4 G/DL
ALP SERPL-CCNC: 59 U/L (ref 30–99)
ALT SERPL-CCNC: 16 U/L (ref 2–50)
ANION GAP SERPL CALC-SCNC: 11 MMOL/L (ref 0–11.9)
APPEARANCE UR: CLEAR
AST SERPL-CCNC: 14 U/L (ref 12–45)
BASOPHILS # BLD AUTO: 0.6 % (ref 0–1.8)
BASOPHILS # BLD: 0.09 K/UL (ref 0–0.12)
BILIRUB SERPL-MCNC: 0.6 MG/DL (ref 0.1–1.5)
BILIRUB UR QL STRIP.AUTO: NEGATIVE
BUN SERPL-MCNC: 14 MG/DL (ref 8–22)
CALCIUM SERPL-MCNC: 9.2 MG/DL (ref 8.5–10.5)
CHLORIDE SERPL-SCNC: 100 MMOL/L (ref 96–112)
CHOLEST SERPL-MCNC: 206 MG/DL (ref 100–199)
CO2 SERPL-SCNC: 28 MMOL/L (ref 20–33)
COLOR UR: YELLOW
CREAT SERPL-MCNC: 0.81 MG/DL (ref 0.5–1.4)
CULTURE IF INDICATED INDCX: NO UA CULTURE
EOSINOPHIL # BLD AUTO: 0.09 K/UL (ref 0–0.51)
EOSINOPHIL NFR BLD: 0.6 % (ref 0–6.9)
ERYTHROCYTE [DISTWIDTH] IN BLOOD BY AUTOMATED COUNT: 49.9 FL (ref 35.9–50)
GFR SERPL CREATININE-BSD FRML MDRD: >60 ML/MIN/1.73 M 2
GLOBULIN SER CALC-MCNC: 2.9 G/DL (ref 1.9–3.5)
GLUCOSE SERPL-MCNC: 103 MG/DL (ref 65–99)
GLUCOSE UR STRIP.AUTO-MCNC: NEGATIVE MG/DL
HCT VFR BLD AUTO: 46.1 % (ref 37–47)
HDLC SERPL-MCNC: 55 MG/DL
HGB BLD-MCNC: 15 G/DL (ref 12–16)
IMM GRANULOCYTES # BLD AUTO: 0.08 K/UL (ref 0–0.11)
IMM GRANULOCYTES NFR BLD AUTO: 0.6 % (ref 0–0.9)
KETONES UR STRIP.AUTO-MCNC: NEGATIVE MG/DL
LDLC SERPL CALC-MCNC: 108 MG/DL
LEUKOCYTE ESTERASE UR QL STRIP.AUTO: NEGATIVE
LYMPHOCYTES # BLD AUTO: 3.77 K/UL (ref 1–4.8)
LYMPHOCYTES NFR BLD: 27.1 % (ref 22–41)
MCH RBC QN AUTO: 31.9 PG (ref 27–33)
MCHC RBC AUTO-ENTMCNC: 32.5 G/DL (ref 33.6–35)
MCV RBC AUTO: 98.1 FL (ref 81.4–97.8)
MICRO URNS: NORMAL
MONOCYTES # BLD AUTO: 0.92 K/UL (ref 0–0.85)
MONOCYTES NFR BLD AUTO: 6.6 % (ref 0–13.4)
NEUTROPHILS # BLD AUTO: 8.98 K/UL (ref 2–7.15)
NEUTROPHILS NFR BLD: 64.5 % (ref 44–72)
NITRITE UR QL STRIP.AUTO: NEGATIVE
NRBC # BLD AUTO: 0 K/UL
NRBC BLD AUTO-RTO: 0 /100 WBC
PH UR STRIP.AUTO: 7 [PH]
PLATELET # BLD AUTO: 360 K/UL (ref 164–446)
PMV BLD AUTO: 9.9 FL (ref 9–12.9)
POTASSIUM SERPL-SCNC: 4.2 MMOL/L (ref 3.6–5.5)
PROT SERPL-MCNC: 7 G/DL (ref 6–8.2)
PROT UR QL STRIP: NEGATIVE MG/DL
RBC # BLD AUTO: 4.7 M/UL (ref 4.2–5.4)
RBC UR QL AUTO: NEGATIVE
SODIUM SERPL-SCNC: 139 MMOL/L (ref 135–145)
SP GR UR STRIP.AUTO: 1.01
T4 FREE SERPL-MCNC: 1.06 NG/DL (ref 0.53–1.43)
TRIGL SERPL-MCNC: 216 MG/DL (ref 0–149)
TSH SERPL DL<=0.005 MIU/L-ACNC: 0.56 UIU/ML (ref 0.3–3.7)
UROBILINOGEN UR STRIP.AUTO-MCNC: 0.2 MG/DL
WBC # BLD AUTO: 13.9 K/UL (ref 4.8–10.8)

## 2017-07-10 PROCEDURE — 84443 ASSAY THYROID STIM HORMONE: CPT

## 2017-07-10 PROCEDURE — 85025 COMPLETE CBC W/AUTO DIFF WBC: CPT

## 2017-07-10 PROCEDURE — 36415 COLL VENOUS BLD VENIPUNCTURE: CPT

## 2017-07-10 PROCEDURE — 82306 VITAMIN D 25 HYDROXY: CPT

## 2017-07-10 PROCEDURE — 84439 ASSAY OF FREE THYROXINE: CPT

## 2017-07-10 PROCEDURE — 81003 URINALYSIS AUTO W/O SCOPE: CPT

## 2017-07-10 PROCEDURE — 80053 COMPREHEN METABOLIC PANEL: CPT

## 2017-07-10 PROCEDURE — 80061 LIPID PANEL: CPT

## 2017-07-12 ENCOUNTER — ANTICOAGULATION MONITORING (OUTPATIENT)
Dept: VASCULAR LAB | Facility: MEDICAL CENTER | Age: 77
End: 2017-07-12

## 2017-07-12 DIAGNOSIS — Z79.01 CHRONIC ANTICOAGULATION: ICD-10-CM

## 2017-07-12 LAB — INR PPP: 3.4 (ref 2–3.5)

## 2017-07-26 ENCOUNTER — HOSPITAL ENCOUNTER (OUTPATIENT)
Dept: RADIOLOGY | Facility: MEDICAL CENTER | Age: 77
End: 2017-07-26
Attending: FAMILY MEDICINE
Payer: MEDICARE

## 2017-07-26 ENCOUNTER — OFFICE VISIT (OUTPATIENT)
Dept: MEDICAL GROUP | Facility: PHYSICIAN GROUP | Age: 77
End: 2017-07-26
Payer: MEDICARE

## 2017-07-26 VITALS
OXYGEN SATURATION: 94 % | HEART RATE: 99 BPM | WEIGHT: 181 LBS | DIASTOLIC BLOOD PRESSURE: 62 MMHG | RESPIRATION RATE: 14 BRPM | TEMPERATURE: 97.5 F | SYSTOLIC BLOOD PRESSURE: 138 MMHG | BODY MASS INDEX: 30.9 KG/M2 | HEIGHT: 64 IN

## 2017-07-26 DIAGNOSIS — Z79.01 CHRONIC ANTICOAGULATION: ICD-10-CM

## 2017-07-26 DIAGNOSIS — I25.10 CORONARY ARTERY DISEASE INVOLVING NATIVE CORONARY ARTERY OF NATIVE HEART WITHOUT ANGINA PECTORIS: ICD-10-CM

## 2017-07-26 DIAGNOSIS — I10 ESSENTIAL HYPERTENSION: ICD-10-CM

## 2017-07-26 DIAGNOSIS — M25.551 PAIN OF RIGHT HIP JOINT: ICD-10-CM

## 2017-07-26 DIAGNOSIS — M32.9 SYSTEMIC LUPUS ERYTHEMATOSUS, UNSPECIFIED SLE TYPE, UNSPECIFIED ORGAN INVOLVEMENT STATUS (HCC): ICD-10-CM

## 2017-07-26 DIAGNOSIS — K21.00 GASTROESOPHAGEAL REFLUX DISEASE WITH ESOPHAGITIS: ICD-10-CM

## 2017-07-26 DIAGNOSIS — I50.40 COMBINED SYSTOLIC AND DIASTOLIC CONGESTIVE HEART FAILURE, UNSPECIFIED CONGESTIVE HEART FAILURE CHRONICITY: ICD-10-CM

## 2017-07-26 DIAGNOSIS — R60.0 LOCALIZED EDEMA: ICD-10-CM

## 2017-07-26 PROCEDURE — 99214 OFFICE O/P EST MOD 30 MIN: CPT | Performed by: FAMILY MEDICINE

## 2017-07-26 PROCEDURE — 73502 X-RAY EXAM HIP UNI 2-3 VIEWS: CPT | Mod: RT

## 2017-07-26 RX ORDER — POTASSIUM CHLORIDE 20 MEQ/1
60 TABLET, EXTENDED RELEASE ORAL DAILY
Qty: 270 TAB | Refills: 3 | Status: SHIPPED | OUTPATIENT
Start: 2017-07-26 | End: 2017-08-14 | Stop reason: SDUPTHER

## 2017-07-26 RX ORDER — FUROSEMIDE 40 MG/1
TABLET ORAL
Qty: 270 TAB | Refills: 3 | Status: ON HOLD | OUTPATIENT
Start: 2017-07-26 | End: 2017-12-13

## 2017-07-26 RX ORDER — RANITIDINE 150 MG/1
150 TABLET ORAL DAILY
Qty: 90 TAB | Refills: 3
Start: 2017-07-26 | End: 2018-04-28 | Stop reason: SDUPTHER

## 2017-07-26 NOTE — MR AVS SNAPSHOT
"        Donte Magaña   2017 3:40 PM   Office Visit   MRN: [id number]    Department:  [facility]   Dept Phone:  [phone]    Description:  Female : 1940   Provider:  Kishore Price M.D.           Reason for Visit     Follow-Up           Allergies as of 2017     Allergen Noted Reactions    Amiodarone 2013   Hives    Throat and tongue itching    Atorvastatin Calcium-Polysorbate 80 2015       Muscle aches      Augmentin 2008       Unknown reaction    Bactrim 2013   Shortness of Breath    Cipro Xr 2009   Swelling    Claritin-D [Loratadine-Pseudoephedrine] 2008   Palpitations    Clotrimazole 2013       Stinging / burning on chest    Diltiazem 10/07/2010       rash    Flecainide 2012       dizziness    Keflex 2008   Unspecified    Pt states \"Unsure\".    Lipitor [Atorvastatin Calcium] 2013       Muscle aches    Metoprolol 10/26/2012       Causes throat swelling    Morphine 2008       Hallucinations    Mucinex 02/15/2013       GI Distress      Qvar [Beclomethasone Dipropionate] 2013       Pressure on heart      Tape 2015   Rash    Paper tape okay    Tramadol 2015       crying    Vibramycin 2013   Shortness of Breath      You were diagnosed with     Pain of right hip joint   [9137867]       Chronic anticoagulation   [983350]       Essential hypertension   [5419341]       Localized edema   [2017]       Coronary artery disease involving native coronary artery of native heart without angina pectoris   [5172883]       Gastroesophageal reflux disease with esophagitis   [3675214]         Vital Signs     Blood Pressure Pulse Temperature Respirations Height Weight    138/62 mmHg 99 36.4 °C (97.5 °F) 14 1.626 m (5' 4\") 82.101 kg (181 lb)    Body Mass Index Oxygen Saturation Last Menstrual Period Smoking Status          31.05 kg/m2 94% 1975 Never Smoker         Basic Information     Date Of Birth Sex " Race Ethnicity Preferred Language    1940 Female White Non- English      Your appointments     Aug 09, 2017 10:00 AM   BONE DENSITY (DEXASCAN) with [facility] BD 1   [facility]    [address]  [address]  [place] [address]   [phone]           No calcium supplements 24 hours prior to exam, including antacids or multivitamins w/calcium.  Pt may eat and drink normally.  No injection procedures prior to Dexa on the same day.  No barium contrast, no CTs with IV or oral contrast, no Pet/CTs and no Nuc Med injections for 7 days prior to a Dexa (including Barium Swallow, Upper GI, Small Bowel Series).  If scheduling a Dexa on the same day as a CT with IV or oral contrast, any test with barium, Pet/CT or a Nuc Med with injection, always schedule the Dexa before the other study.              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   5/3/2011 - Present    Glaucoma H40.9 Low  5/3/2011 - Present    Macular degeneration H35.30 Low  5/3/2011 - Present    PVC's (premature ventricular contractions) (Chronic) I49.3 Low  1/12/2012 - Present    Menopause Z78.0   1/12/2012 - Present    Personal history of actinic keratosis Z87.2   5/15/2012 - Present    Mitral regurgitation I34.0 Medium  10/30/2012 - Present    HTN (hypertension) I10 Low  2/16/2013 - Present    Hypothyroid E03.9 Low  2/16/2013 - Present    Chondromalacia patellae of left knee    7/2/2013 - Present    Spinal stenosis, multilevel M48.00 Medium  7/2/2013 - Present    Chronic anticoagulation Z79.01 High  11/14/2013 - Present    Hypothyroidism E03.9 Low  5/21/2014 - Present    CAD (coronary artery disease) I25.10 Medium  10/7/2014 - Present    Senile nuclear sclerosis H25.10   5/5/2015 - Present    Osteoarthrosis involving  lower leg M17.10   5/28/2015 - Present    Depression F32.9 Low  7/30/2015 - Present    Pulmonary hypertension (CMS-HCC) I27.2   9/16/2015 - Present    Degenerative arthritis of knee, bilateral M17.0 High  6/23/2016 - Present    Permanent atrial fibrillation (CMS-HCC) I48.2   10/17/2016 - Present    Obesity (BMI 30-39.9) E66.9   3/8/2017 - Present      Health Maintenance        Date Due Completion Dates    COLONOSCOPY 9/28/2018 (Originally 9/29/2015) 9/29/2005    IMM ZOSTER VACCINE 9/28/2018 (Originally 11/16/1998) ---    IMM INFLUENZA (1) 9/1/2017 9/10/2016, 9/14/2015 (Done), 9/8/2015, 10/9/2014, 10/8/2013, 10/28/2012, 10/15/2011, 10/3/2010    Override on 9/14/2015: Done    MAMMOGRAM 12/7/2017 12/7/2016, 11/12/2015, 11/10/2014, 10/14/2013, 5/30/2012, 5/27/2011, 5/7/2010, 5/7/2010, 3/16/2009, 3/16/2009, 3/10/2008, 2/28/2007, 2/27/2006, 2/25/2005    BONE DENSITY 5/11/2020 5/11/2015, 4/4/2013    IMM DTaP/Tdap/Td Vaccine (2 - Td) 5/15/2022 5/15/2012            Current Immunizations     13-VALENT PCV PREVNAR 11/3/2015    Influenza TIV (IM) 10/9/2014, 10/28/2012  9:06 PM, 10/15/2011, 10/3/2010  2:06 PM    Influenza Vaccine Adult HD 9/10/2016, 9/8/2015    Influenza Vaccine Quad Inj (Preserved) 10/8/2013    Pneumococcal polysaccharide vaccine (PPSV-23) 10/15/2009    Tdap Vaccine 5/15/2012      Below and/or attached are the medications your provider expects you to take. Review all of your home medications and newly ordered medications with your provider and/or pharmacist. Follow medication instructions as directed by your provider and/or pharmacist. Please keep your medication list with you and share with your provider. Update the information when medications are discontinued, doses are changed, or new medications (including over-the-counter products) are added; and carry medication information at all times in the event of emergency situations     Allergies:  AMIODARONE - Hives     ATORVASTATIN CALCIUM-POLYSORBATE 80 -  (reactions not documented)     AUGMENTIN - (reactions not documented)     BACTRIM - Shortness of Breath     CIPRO XR - Swelling     CLARITIN-D - Palpitations     CLOTRIMAZOLE - (reactions not documented)     DILTIAZEM - (reactions not documented)     FLECAINIDE - (reactions not documented)     KEFLEX - Unspecified     LIPITOR - (reactions not documented)     METOPROLOL - (reactions not documented)     MORPHINE - (reactions not documented)     MUCINEX - (reactions not documented)     QVAR - (reactions not documented)     TAPE - Rash     TRAMADOL - (reactions not documented)     VIBRAMYCIN - Shortness of Breath               Medications  Valid as of: July 26, 2017 -  3:58 PM    Generic Name Brand Name Tablet Size Instructions for use    Acetaminophen (Tab) TYLENOL 500 MG Take 1,000 mg by mouth 3 times a day. Indications: Pain        Albuterol Sulfate (Aero Soln) albuterol 108 (90 BASE) MCG/ACT Inhale 2 Puffs by mouth every 6 hours as needed for Shortness of Breath.        Albuterol Sulfate (Nebu Soln) PROVENTIL 2.5mg/3ml 2.5 mg by Nebulization route every four hours as needed for Shortness of Breath.        Cholecalciferol (Tab) cholecalciferol 1000 UNIT Take 2,000 Units by mouth every day.        Diclofenac Sodium (Gel) Diclofenac Sodium 1 % Apply 2 g to affected area(s) as needed (For joint pain).        Digoxin (Tab) LANOXIN 125 MCG TAKE ONE TABLET BY MOUTH EVERY DAY        Docusate Sodium (Cap) COLACE 100 MG Take 200 mg by mouth every evening.        Estradiol (Tab) ESTRACE 2 MG TAKE ONE TABLET BY MOUTH EVERY DAY        Fexofenadine HCl (Tab) ALLEGRA 60 MG Take 1 Tab by mouth as needed. For allergies  Indications: Hayfever        Furosemide (Tab) LASIX 40 MG TAKE THREE TABLETS BY MOUTH DAILY        Levothyroxine Sodium (Tab) SYNTHROID 88 MCG TAKE  ONE TABLET BY MOUTH EVERY MORNING BEFORE BREAKFAST        Lisinopril (Tab) PRINIVIL 5 MG Take 1 Tab by mouth every day.        Magnesium Oxide (Tab) MAGOX 400 400 (241.3  MG) MG Take 400 mg by mouth every day.        Magnesium Oxide (Tab) MAG- MG TAKE ONE TABLET BY MOUTH EVERY DAY        Multiple Vitamins-Minerals (Cap) ICAPS AREDS 2  Take 1 Cap by mouth every day.        Potassium Chloride Celina CR (Tab CR) Kdur 20 MEQ Take 3 Tabs by mouth every day.        RaNITidine HCl (Tab) ZANTAC 150 MG Take 1 Tab by mouth every day.        Sennosides-Docusate Sodium (Tab) SENOKOT-S 8.6-50 MG Take 3 Tabs by mouth every day.        Sertraline HCl (Tab) ZOLOFT 25 MG TAKE ONE TABLET BY MOUTH EVERY DAY        Verapamil HCl (Tab) ISOPTIN 120 MG Take 1 Tab by mouth See Admin Instructions. Take 240 mg every morning, take 180 mg at noon, take 180 mg at 8pm        Warfarin Sodium (Tab) COUMADIN 2.5 MG Take 2.5 mg by mouth every day. Patient reports: Takes one 2.5 mg Sunday Monday Tuesday Wednesday Friday Saturday and on Thursday take 1 1/2 tab        .                 Medicines prescribed today were sent to:     [id number] - [place], [place] - [address]    [address] [place] [address]    Phone: [phone] Fax: [phone]    Open 24 Hours?: No      Medication refill instructions:       If your prescription bottle indicates you have medication refills left, it is not necessary to call your provider’s office. Please contact your pharmacy and they will refill your medication.    If your prescription bottle indicates you do not have any refills left, you may request refills at any time through one of the following ways: The online [employer] system (except Urgent Care), by calling your provider’s office, or by asking your pharmacy to contact your provider’s office with a refill request. Medication refills are processed only during regular business hours and may not be available until the next business day. Your provider may request additional information or to have a follow-up visit with you prior to refilling your medication.   *Please Note: Medication refills are assigned a new Rx  number when refilled electronically. Your pharmacy may indicate that no refills were authorized even though a new prescription for the same medication is available at the pharmacy. Please request the medicine by name with the pharmacy before contacting your provider for a refill.        Your To Do List     Future Labs/Procedures Complete By Expires    DX-HIP-COMPLETE - UNILATERAL 2+ RIGHT  As directed 7/27/2018      Other Notes About Your Plan     Donte Alicea is an -[facility] patient of Dr. Price  Please Assess the following diagnosis:    -this patient is on chronic Coumadin therapy for treatment of atrial fib. ? Coagulation defect, code 286.9    -per OV of 6/25/15, patient was started on Zoloft for treatment of depression. ? Major depressive disorder, single episode, unspecified degree, code 296.20             MyChart Access Code: Activation code not generated  Current MyChart Status: Active

## 2017-07-27 NOTE — PROGRESS NOTES
"Patient comes in stating that she's gotten swelling in her ankles. She wonders if she should increase her Lasix. She does have a history of CHF. She denies shortness of breath. She denies chest pains.  She does suffer from arthritis and complains of right hip pain. She has lupus. When he get x-rays.  She takes Tylenol for pain. She does not want to have surgery on her hip if she can avoid it.  Her GERD is quiescent.    I reviewed the following    Past Medical History   Diagnosis Date   • Heart rate fast    • Backpain    • Hypertension    • Hypothyroid    • Glaucoma 5/3/2011   • High risk medication use 1/12/2012   • Anticoagulant long-term use 1/12/2012   • Obesity 1/12/2012   • Menopause 1/12/2012   • Indigestion    • Unspecified urinary incontinence    • Pulmonary hypertension (CMS-[facility]) 10/30/2012   • Hematoma complicating a procedure 11/3/2012   • Unspecified disorder of thyroid      hypothyroidism   • Pneumonia feb,2013   • High cholesterol    • PVC's (premature ventricular contractions) 1/12/2012   • Anesthesia      \"Tachycardia for 5 days after cataract surgery\"   • Arthritis      Knees, hips   • Depression    • Macular degeneration    • Lupus (CMS-[facility])    • CAD (coronary artery disease)    • Spinal stenosis of lumbar region at multiple levels    • Bronchitis Nov, 2013   • Unspecified cataract      repaired bilateral   • Senile nuclear sclerosis    • Mitral regurgitation 10/30/2012   • A-fib (CMS-[facility])    • Asthma      with pneumonia, nothing for 3 years   • Breath shortness      with exertion O2  2l/m PRN, hasnt used for 3 years        Past Surgical History   Procedure Laterality Date   • Tonsillectomy and adenoidectomy     • Recovery  11/30/2010     Performed by SURGERY, CATH-RECOVERY at SURGERY SAME DAY [facility] ORS   • Colonoscopy  2008     Normal    [facility]   • Abdominal hysterectomy total  April 15,1975     still has ovaries   • Other       Removed pins from left ankle   • Cataract phaco with iol  " "4/21/2015     Performed by Dmitry Bejarano M.D. at [facility] ORS   • Cataract phaco with iol Right 5/5/2015     Procedure: IOL OD - STANDARD;  Surgeon: Dmitry Bejarano M.D.;  Location: [facility];  Service:    • Open reduction       left ankle   • Knee arthroplasty total Left 5/28/2015     Procedure: KNEE ARTHROPLASTY TOTAL;  Surgeon: Heriberto Lozada M.D.;  Location: SURGERY [facility];  Service:    • Knee arthroplasty total Right 6/23/2016     Procedure: KNEE ARTHROPLASTY TOTAL;  Surgeon: Heriberto Lozada M.D.;  Location: SURGERY [facility];  Service:        Allergies   Allergen Reactions   • Amiodarone Hives     Throat and tongue itching   • Atorvastatin Calcium-Polysorbate 80      Muscle aches     • Augmentin      Unknown reaction   • Bactrim Shortness of Breath   • Cipro Xr Swelling   • Claritin-D [Loratadine-Pseudoephedrine] Palpitations   • Clotrimazole      Stinging / burning on chest   • Diltiazem      rash   • Flecainide      dizziness   • Keflex Unspecified     Pt states \"Unsure\".   • Lipitor [Atorvastatin Calcium]      Muscle aches   • Metoprolol      Causes throat swelling   • Morphine      Hallucinations   • Mucinex      GI Distress     • Qvar [Beclomethasone Dipropionate]      Pressure on heart     • Tape Rash     Paper tape okay   • Tramadol      crying   • Vibramycin Shortness of Breath       Current Outpatient Prescriptions   Medication Sig Dispense Refill   • furosemide (LASIX) 40 MG Tab TAKE THREE TABLETS BY MOUTH DAILY 270 Tab 3   • potassium chloride SA (KDUR) 20 MEQ Tab CR Take 3 Tabs by mouth every day. 270 Tab 3   • ranitidine (ZANTAC) 150 MG Tab Take 1 Tab by mouth every day. 90 Tab 3   • lisinopril (PRINIVIL) 5 MG Tab Take 1 Tab by mouth every day. 90 Tab 2   • levothyroxine (SYNTHROID) 88 MCG Tab TAKE  ONE TABLET BY MOUTH EVERY MORNING BEFORE BREAKFAST 90 Tab 3   • magnesium oxide (MAG-OX) 400 MG Tab TAKE ONE TABLET BY MOUTH EVERY DAY 90 Tab 3   • " warfarin (COUMADIN) 2.5 MG Tab Take 2.5 mg by mouth every day. Patient reports: Takes one 2.5 mg Sunday Monday Tuesday Wednesday Friday Saturday and on Thursday take 1 1/2 tab     • estradiol (ESTRACE) 2 MG Tab TAKE ONE TABLET BY MOUTH EVERY DAY 90 Tab 1   • digoxin (LANOXIN) 125 MCG Tab TAKE ONE TABLET BY MOUTH EVERY DAY 90 Tab 3   • sertraline (ZOLOFT) 25 MG tablet TAKE ONE TABLET BY MOUTH EVERY DAY 90 Tab 1   • verapamil (ISOPTIN) 120 MG Tab Take 1 Tab by mouth See Admin Instructions. Take 240 mg every morning, take 180 mg at noon, take 180 mg at 8pm 450 Tab 2   • Multiple Vitamins-Minerals (ICAPS AREDS 2) Cap Take 1 Cap by mouth every day. (Patient taking differently: Take 1 Cap by mouth every day. Pt takes the chewable tabs) 100 Cap 3   • acetaminophen (TYLENOL) 500 MG TABS Take 1,000 mg by mouth 3 times a day. Indications: Pain     • sennosides-docusate sodium (SENOKOT-S) 8.6-50 MG tablet Take 3 Tabs by mouth every day. 30 Tab    • vitamin D (CHOLECALCIFEROL) 1000 UNIT TABS Take 2,000 Units by mouth every day.     • fexofenadine (ALLEGRA) 60 MG Tab Take 1 Tab by mouth as needed. For allergies  Indications: Hayfever 180 Tab 3   • albuterol 108 (90 BASE) MCG/ACT Aero Soln inhalation aerosol Inhale 2 Puffs by mouth every 6 hours as needed for Shortness of Breath.     • docusate sodium (COLACE) 100 MG Cap Take 200 mg by mouth every evening.     • Diclofenac Sodium 1 % Gel Apply 2 g to affected area(s) as needed (For joint pain).     • albuterol (PROVENTIL) 2.5mg/3ml Nebu Soln solution for nebulization 2.5 mg by Nebulization route every four hours as needed for Shortness of Breath.     • MAGOX 400 400 (241.3 MG) MG TABS tablet Take 400 mg by mouth every day.  3     No current facility-administered medications for this visit.        Family History   Problem Relation Age of Onset   • Stroke Mother    • Diabetes Father    • Stroke Sister    • Heart Disease Brother    • Stroke Sister    • GI Daughter      Crohn's  Disease   • Heart Disease Daughter      CHF   • Other Daughter      Chronic Pain--Lymphedema   • Cancer Paternal Aunt        Social History     Social History   • Marital Status:      Spouse Name: N/A   • Number of Children: N/A   • Years of Education: N/A     Occupational History   • Not on file.     Social History Main Topics   • Smoking status: Never Smoker    • Smokeless tobacco: Never Used   • Alcohol Use: No   • Drug Use: No   • Sexual Activity:     Partners: Male     Birth Control/ Protection: Post-Menopausal     Other Topics Concern   • Not on file     Social History Narrative      Physical Exam   Constitutional: She is oriented. She appears well-developed and well-nourished. No distress.   HENT:  Head: Normocephalic and atraumatic.   Right Ear: External ear normal. Ear canal and TM normal   Left Ear: External ear normal. Ear canal and TM normal  Nose: Nose normal.   Mouth/Throat: Oropharynx is clear and moist.   Eyes: Conjunctivae and extraocular motions are normal. Pupils are equal, round, and reactive to light. Fundi benign bilaterally   Neck: No thyromegaly present.   Cardiovascular: Normal rate, regular rhythm, normal heart sounds and intact distal pulses.  Exam reveals no gallop.    No murmur heard.  Pulmonary/Chest: Effort normal and breath sounds normal. No respiratory distress. She has no wheezes. She has no rales.   Abdominal: Soft. Bowel sounds are normal. No hepatosplenomegaly She exhibits no distension. No tenderness. She has no rebound and no guarding.   Musculoskeletal: Normal range of motion. She exhibits 2+ bilateral ankle edema and no tenderness.   Lymphadenopathy:     She has no cervical adenopathy.   No supraclavicular adenopathy  Neurological: She is alert and oriented. She has normal reflexes.        Babinskis downgoing bilaterally   Skin: Skin is warm and dry. No rash noted. No erythema.   Psychiatric: She has a normal mood and appropriate affect. Her behavior is normal.  Judgment and thought content normal.     1. Pain of right hip joint  DX-HIP-COMPLETE - UNILATERAL 2+ RIGHT  She will take Tylenol as needed for pain    2. Chronic anticoagulation   ongoing    3. Essential hypertension --controlled  furosemide (LASIX) 40 MG Tab    potassium chloride SA (KDUR) 20 MEQ Tab CR   4. Localized edema  furosemide (LASIX) 40 MG Tab--increase to 3 by mouth daily     potassium chloride SA (KDUR) 20 MEQ Tab CR--increase to 3 by mouth daily    5. Coronary artery disease involving native coronary artery of native heart without angina pectoris   stable    6. Gastroesophageal reflux disease with esophagitis  ranitidine (ZANTAC) 150 MG Tab--OTC    7. Combined systolic and diastolic congestive heart failure, unspecified congestive heart failure chronicity (CMS-HCC)   increase Lasix and potassium as above    8. Systemic lupus erythematosus, unspecified SLE type, unspecified organ involvement status (CMS-HCC)   this may well have something to do with her hip pain      Recheck with me when necessary    Please note that this dictation was created using voice recognition software. I have worked with consultants from the vendor as well as technical experts from [facility] [employer] to optimize the interface. I have made every reasonable attempt to correct obvious errors, but I expect that there are errors of grammar and possibly content that I did not discover before finalizing the note.

## 2017-07-31 RX ORDER — ESTRADIOL 2 MG/1
TABLET ORAL
Qty: 90 TAB | Refills: 1 | Status: SHIPPED | OUTPATIENT
Start: 2017-07-31 | End: 2018-02-03 | Stop reason: SDUPTHER

## 2017-08-09 ENCOUNTER — HOSPITAL ENCOUNTER (OUTPATIENT)
Dept: RADIOLOGY | Facility: MEDICAL CENTER | Age: 77
End: 2017-08-09
Attending: FAMILY MEDICINE
Payer: MEDICARE

## 2017-08-09 ENCOUNTER — ANTICOAGULATION MONITORING (OUTPATIENT)
Dept: VASCULAR LAB | Facility: MEDICAL CENTER | Age: 77
End: 2017-08-09

## 2017-08-09 DIAGNOSIS — Z79.01 CHRONIC ANTICOAGULATION: ICD-10-CM

## 2017-08-09 DIAGNOSIS — Z78.0 MENOPAUSE: ICD-10-CM

## 2017-08-09 DIAGNOSIS — N95.8 OTHER SPECIFIED MENOPAUSAL AND POSTMENOPAUSAL DISORDER: ICD-10-CM

## 2017-08-09 LAB — INR PPP: 2.5 (ref 2–3.5)

## 2017-08-09 PROCEDURE — 77080 DXA BONE DENSITY AXIAL: CPT

## 2017-08-09 RX ORDER — CALCIUM CARBONATE 500(1250)
500 TABLET ORAL 2 TIMES DAILY WITH MEALS
Qty: 90 TAB | Refills: 3
Start: 2017-08-09 | End: 2017-12-11

## 2017-08-09 NOTE — PROGRESS NOTES
Anticoagulation Summary as of 8/9/2017     INR goal 2.0-3.0   Selected INR 2.5 (8/9/2017)   Maintenance plan 3.75 mg (2.5 mg x 1.5) on Mon; 2.5 mg (2.5 mg x 1) all other days   Weekly total 18.75 mg   Plan last modified Vladimir Taylor PHARMD (7/12/2017)   Next INR check 8/23/2017   Target end date Indefinite    Indications   Atrial fibrillation (CMS-HCC) (Resolved) [I48.91]  Chronic anticoagulation [Z79.01]         Anticoagulation Episode Summary     INR check location Home Draw    Preferred lab     Send INR reminders to     Bria Montero       Anticoagulation Care Providers     Provider Role Specialty Phone number    Lizzy Haywood M.D. Referring Cardiology [phone]    [facility] Anticoagulation Services Responsible  [phone]    Vladimir Taylor, GILMA Responsible          Anticoagulation Patient Findings      Spoke with patient's  to report a therapeutic INR.    Pt instructed to continue with current warfarin dosing regimen, confirms dosing.   Pt denies any s/s of bleeding, bruising, clotting or any changes to diet or medication.    Will follow up in 2 weeks.     Kelli Ma, VIOLETD

## 2017-08-14 ENCOUNTER — OFFICE VISIT (OUTPATIENT)
Dept: CARDIOLOGY | Facility: MEDICAL CENTER | Age: 77
End: 2017-08-14
Payer: MEDICARE

## 2017-08-14 ENCOUNTER — HOSPITAL ENCOUNTER (OUTPATIENT)
Dept: LAB | Facility: MEDICAL CENTER | Age: 77
End: 2017-08-14
Attending: NURSE PRACTITIONER
Payer: MEDICARE

## 2017-08-14 VITALS
DIASTOLIC BLOOD PRESSURE: 60 MMHG | HEART RATE: 90 BPM | HEIGHT: 64 IN | BODY MASS INDEX: 31.24 KG/M2 | WEIGHT: 183 LBS | SYSTOLIC BLOOD PRESSURE: 132 MMHG

## 2017-08-14 DIAGNOSIS — I10 ESSENTIAL HYPERTENSION: ICD-10-CM

## 2017-08-14 DIAGNOSIS — I48.21 PERMANENT ATRIAL FIBRILLATION (HCC): ICD-10-CM

## 2017-08-14 DIAGNOSIS — E66.9 OBESITY (BMI 30-39.9): ICD-10-CM

## 2017-08-14 DIAGNOSIS — I51.89 DIASTOLIC DYSFUNCTION: ICD-10-CM

## 2017-08-14 DIAGNOSIS — I25.10 CORONARY ARTERY DISEASE INVOLVING NATIVE CORONARY ARTERY OF NATIVE HEART WITHOUT ANGINA PECTORIS: ICD-10-CM

## 2017-08-14 DIAGNOSIS — Z79.01 CHRONIC ANTICOAGULATION: ICD-10-CM

## 2017-08-14 DIAGNOSIS — R60.0 LOCALIZED EDEMA: ICD-10-CM

## 2017-08-14 DIAGNOSIS — E78.2 MIXED HYPERLIPIDEMIA: ICD-10-CM

## 2017-08-14 LAB
ALBUMIN SERPL BCP-MCNC: 3.2 G/DL (ref 3.2–4.9)
ALBUMIN/GLOB SERPL: 0.9 G/DL
ALP SERPL-CCNC: 68 U/L (ref 30–99)
ALT SERPL-CCNC: 13 U/L (ref 2–50)
ANION GAP SERPL CALC-SCNC: 12 MMOL/L (ref 0–11.9)
AST SERPL-CCNC: 15 U/L (ref 12–45)
BILIRUB SERPL-MCNC: 0.3 MG/DL (ref 0.1–1.5)
BNP SERPL-MCNC: 91 PG/ML (ref 0–100)
BUN SERPL-MCNC: 16 MG/DL (ref 8–22)
CALCIUM SERPL-MCNC: 9.4 MG/DL (ref 8.5–10.5)
CHLORIDE SERPL-SCNC: 101 MMOL/L (ref 96–112)
CO2 SERPL-SCNC: 24 MMOL/L (ref 20–33)
CREAT SERPL-MCNC: 0.78 MG/DL (ref 0.5–1.4)
GFR SERPL CREATININE-BSD FRML MDRD: >60 ML/MIN/1.73 M 2
GLOBULIN SER CALC-MCNC: 3.6 G/DL (ref 1.9–3.5)
GLUCOSE SERPL-MCNC: 92 MG/DL (ref 65–99)
POTASSIUM SERPL-SCNC: 4 MMOL/L (ref 3.6–5.5)
PROT SERPL-MCNC: 6.8 G/DL (ref 6–8.2)
SODIUM SERPL-SCNC: 137 MMOL/L (ref 135–145)

## 2017-08-14 PROCEDURE — 80053 COMPREHEN METABOLIC PANEL: CPT

## 2017-08-14 PROCEDURE — 36415 COLL VENOUS BLD VENIPUNCTURE: CPT

## 2017-08-14 PROCEDURE — 99214 OFFICE O/P EST MOD 30 MIN: CPT | Performed by: NURSE PRACTITIONER

## 2017-08-14 PROCEDURE — 83880 ASSAY OF NATRIURETIC PEPTIDE: CPT

## 2017-08-14 RX ORDER — POTASSIUM CHLORIDE 20 MEQ/1
20 TABLET, EXTENDED RELEASE ORAL 3 TIMES DAILY
Qty: 180 TAB | Refills: 3 | COMMUNITY
Start: 2017-08-14 | End: 2018-04-17 | Stop reason: SDUPTHER

## 2017-08-14 ASSESSMENT — ENCOUNTER SYMPTOMS
COUGH: 0
FEVER: 0
SHORTNESS OF BREATH: 0
DIZZINESS: 0
CLAUDICATION: 0
MYALGIAS: 0
ORTHOPNEA: 0
ABDOMINAL PAIN: 0
PALPITATIONS: 0
PND: 0

## 2017-08-14 NOTE — MR AVS SNAPSHOT
"        Donte Willoughbyspeth   2017 1:40 PM   Office Visit   MRN: [id number]    Department:  [facility]   Dept Phone:  [phone]    Description:  Female : 1940   Provider:  VICTORIANO Sanchez           Reason for Visit     Follow-Up           Allergies as of 2017     Allergen Noted Reactions    Amiodarone 2013   Hives    Throat and tongue itching    Atorvastatin Calcium-Polysorbate 80 2015       Muscle aches      Augmentin 2008       Unknown reaction    Bactrim 2013   Shortness of Breath    Cipro Xr 2009   Swelling    Claritin-D [Loratadine-Pseudoephedrine] 2008   Palpitations    Clotrimazole 2013       Stinging / burning on chest    Diltiazem 10/07/2010       rash    Flecainide 2012       dizziness    Keflex 2008   Unspecified    Pt states \"Unsure\".    Lipitor [Atorvastatin Calcium] 2013       Muscle aches    Metoprolol 10/26/2012       Causes throat swelling    Morphine 2008       Hallucinations    Mucinex 02/15/2013       GI Distress      Qvar [Beclomethasone Dipropionate] 2013       Pressure on heart      Tape 2015   Rash    Paper tape okay    Tramadol 2015       crying    Vibramycin 2013   Shortness of Breath      You were diagnosed with     Localized edema   [679503]       Essential hypertension   [6189247]       Diastolic dysfunction   [044763]       Chronic anticoagulation   [590281]       Permanent atrial fibrillation (CMS-HCC)   [458807]       Obesity (BMI 30-39.9)   [186272]       Coronary artery disease involving native coronary artery of native heart without angina pectoris   [0101195]       Mixed hyperlipidemia   [272.2.ICD-9-CM]         Vital Signs     Blood Pressure Pulse Height Weight Body Mass Index Last Menstrual Period    132/60 mmHg 90 1.626 m (5' 4\") 83.008 kg (183 lb) 31.40 kg/m2 1975    Smoking Status                   Never Smoker            Basic Information    " Date Of Birth Sex Race Ethnicity Preferred Language    1940 Female White Non- English      Your appointments     Nov 21, 2017 11:15 AM   FOLLOW UP with Lizzy Haywood M.D.   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 Low  5/3/2011 - Present    Glaucoma H40.9 Low  5/3/2011 - Present    Macular degeneration H35.30 Low  5/3/2011 - Present    Menopause Z78.0 Low  1/12/2012 - Present    Personal history of actinic keratosis Z87.2 Low  5/15/2012 - Present    HTN (hypertension) I10 Medium  2/16/2013 - Present    Chondromalacia patellae of left knee  Low  7/2/2013 - Present    Spinal stenosis, multilevel M48.00 Low  7/2/2013 - Present    Chronic anticoagulation Z79.01 Low  11/14/2013 - Present    Hypothyroidism E03.9 Low  5/21/2014 - Present    CAD (coronary artery disease) I25.10 Medium  10/7/2014 - Present    Senile nuclear sclerosis H25.10 Low  5/5/2015 - Present    Osteoarthrosis involving lower leg M17.10 Low  5/28/2015 - Present    Depression F32.9 Low  7/30/2015 - Present    Degenerative arthritis of knee, bilateral M17.0 Low  6/23/2016 - Present    Permanent atrial fibrillation (CMS-HCC) I48.2 Medium  10/17/2016 - Present    Obesity (BMI 30-39.9) E66.9 Medium  3/8/2017 - Present    Localized edema R60.0 Medium  8/14/2017 - Present    Diastolic dysfunction I51.9 Medium  8/14/2017 - Present      Health Maintenance        Date Due Completion Dates    COLONOSCOPY 9/28/2018 (Originally 9/29/2015) 9/29/2005    IMM ZOSTER VACCINE 9/28/2018 (Originally 11/16/1998) ---    IMM INFLUENZA (1) 9/1/2017 9/10/2016, 9/14/2015 (Done), 9/8/2015, 10/9/2014, 10/8/2013, 10/28/2012, 10/15/2011,  10/3/2010    Override on 9/14/2015: Done    MAMMOGRAM 12/7/2017 12/7/2016, 11/12/2015, 11/10/2014, 10/14/2013, 5/30/2012, 5/27/2011, 5/7/2010, 5/7/2010, 3/16/2009, 3/16/2009, 3/10/2008, 2/28/2007, 2/27/2006, 2/25/2005    IMM DTaP/Tdap/Td Vaccine (2 - Td) 5/15/2022 5/15/2012    BONE DENSITY 8/9/2022 8/9/2017, 5/11/2015, 4/4/2013            Current Immunizations     13-VALENT PCV PREVNAR 11/3/2015    Influenza TIV (IM) 10/9/2014, 10/28/2012  9:06 PM, 10/15/2011, 10/3/2010  2:06 PM    Influenza Vaccine Adult HD 9/10/2016, 9/8/2015    Influenza Vaccine Quad Inj (Preserved) 10/8/2013    Pneumococcal polysaccharide vaccine (PPSV-23) 10/15/2009    Tdap Vaccine 5/15/2012      Below and/or attached are the medications your provider expects you to take. Review all of your home medications and newly ordered medications with your provider and/or pharmacist. Follow medication instructions as directed by your provider and/or pharmacist. Please keep your medication list with you and share with your provider. Update the information when medications are discontinued, doses are changed, or new medications (including over-the-counter products) are added; and carry medication information at all times in the event of emergency situations     Allergies:  AMIODARONE - Hives     ATORVASTATIN CALCIUM-POLYSORBATE 80 - (reactions not documented)     AUGMENTIN - (reactions not documented)     BACTRIM - Shortness of Breath     CIPRO XR - Swelling     CLARITIN-D - Palpitations     CLOTRIMAZOLE - (reactions not documented)     DILTIAZEM - (reactions not documented)     FLECAINIDE - (reactions not documented)     KEFLEX - Unspecified     LIPITOR - (reactions not documented)     METOPROLOL - (reactions not documented)     MORPHINE - (reactions not documented)     MUCINEX - (reactions not documented)     QVAR - (reactions not documented)     TAPE - Rash     TRAMADOL - (reactions not documented)     VIBRAMYCIN - Shortness of Breath                  Medications  Valid as of: August 14, 2017 -  2:34 PM    Generic Name Brand Name Tablet Size Instructions for use    Acetaminophen (Tab) TYLENOL 500 MG Take 1,000 mg by mouth 3 times a day. Indications: Pain        Albuterol Sulfate (Aero Soln) albuterol 108 (90 BASE) MCG/ACT Inhale 2 Puffs by mouth every 6 hours as needed for Shortness of Breath.        Albuterol Sulfate (Nebu Soln) PROVENTIL 2.5mg/3ml 2.5 mg by Nebulization route every four hours as needed for Shortness of Breath.        Calcium (Tab) OS-BRAYDON 500 500 MG Take 1 Tab by mouth 2 times a day, with meals.        Cholecalciferol (Tab) cholecalciferol 1000 UNIT Take 2,000 Units by mouth every day.        Diclofenac Sodium (Gel) Diclofenac Sodium 1 % Apply 2 g to affected area(s) as needed (For joint pain).        Digoxin (Tab) LANOXIN 125 MCG TAKE ONE TABLET BY MOUTH EVERY DAY        Docusate Sodium (Cap) COLACE 100 MG Take 200 mg by mouth every evening.        Estradiol (Tab) ESTRACE 2 MG TAKE ONE TABLET BY MOUTH EVERY DAY        Fexofenadine HCl (Tab) ALLEGRA 60 MG Take 1 Tab by mouth as needed. For allergies  Indications: Hayfever        Furosemide (Tab) LASIX 40 MG TAKE THREE TABLETS BY MOUTH DAILY        Levothyroxine Sodium (Tab) SYNTHROID 88 MCG TAKE  ONE TABLET BY MOUTH EVERY MORNING BEFORE BREAKFAST        Lisinopril (Tab) PRINIVIL 5 MG Take 1 Tab by mouth every day.        Magnesium Oxide (Tab) MAGOX 400 400 (241.3 MG) MG Take 400 mg by mouth every day.        Magnesium Oxide (Tab) MAG- MG TAKE ONE TABLET BY MOUTH EVERY DAY        Multiple Vitamins-Minerals (Cap) ICAPS AREDS 2  Take 2 Caps by mouth every day.        Potassium Chloride Celina CR (Tab CR) Kdur 20 MEQ Take 3 Tabs by mouth 3 times a day.        RaNITidine HCl (Tab) ZANTAC 150 MG Take 1 Tab by mouth every day.        Sennosides-Docusate Sodium (Tab) SENOKOT-S 8.6-50 MG Take 3 Tabs by mouth every day.        Sertraline HCl (Tab) ZOLOFT 25 MG TAKE ONE TABLET BY MOUTH EVERY DAY         Verapamil HCl (Tab) ISOPTIN 120 MG Take 1 Tab by mouth See Admin Instructions. Take 240 mg every morning, take 180 mg at noon, take 180 mg at 8pm        Warfarin Sodium (Tab) COUMADIN 2.5 MG Take 2.5 mg by mouth every day. Patient reports: Takes one 2.5 mg Sunday Monday Tuesday Wednesday Friday Saturday and on Thursday take 1 1/2 tab        .                 Medicines prescribed today were sent to:     [id number] - [place], [place] - [address]    [address] [address] [facility] [place] [address]    Phone: [phone] Fax: [phone]    Open 24 Hours?: No      Medication refill instructions:       If your prescription bottle indicates you have medication refills left, it is not necessary to call your provider’s office. Please contact your pharmacy and they will refill your medication.    If your prescription bottle indicates you do not have any refills left, you may request refills at any time through one of the following ways: The online [employer] system (except Urgent Care), by calling your provider’s office, or by asking your pharmacy to contact your provider’s office with a refill request. Medication refills are processed only during regular business hours and may not be available until the next business day. Your provider may request additional information or to have a follow-up visit with you prior to refilling your medication.   *Please Note: Medication refills are assigned a new Rx number when refilled electronically. Your pharmacy may indicate that no refills were authorized even though a new prescription for the same medication is available at the pharmacy. Please request the medicine by name with the pharmacy before contacting your provider for a refill.        Your To Do List     Future Labs/Procedures Complete By Expires    BTYPE NATRIURETIC PEPTIDE  As directed 8/15/2018    COMP METABOLIC PANEL  As directed 8/15/2018      Other Notes About Your Plan     Donte Alicea is an -[facility]Te patient of   [facility]  Please Assess the following diagnosis:    -this patient is on chronic Coumadin therapy for treatment of atrial fib. ? Coagulation defect, code 286.9    -per OV of 6/25/15, patient was started on Zoloft for treatment of depression. ? Major depressive disorder, single episode, unspecified degree, code 296.20             MyChart Access Code: Activation code not generated  Current MyChart Status: Active

## 2017-08-14 NOTE — Clinical Note
"     [facility]   [address]  [place] [address]  Phone: [phone]  Fax: [phone]              Donte Magaña  1940    Encounter Date: 8/14/2017    VICTORIANO Sanchez          PROGRESS NOTE:  Subjective:   Donte Magaña is a 76 y.o. female who presents today for follow up concerning new worsening onset of lower extremity edema associated with heel wounds.    She is a patient of HERMANN Haywood in our office. Hx of CAD (medical management only), chronic afib on coumadin, HTN, hypothyroid, lupus, obesity, and HLD.    She is overall feeling okay but her feet continue to swell. She knows she doesn't eat the healthiest, eats potato chips every day. She is not an ideal weight for her height.     She has no other complaints except her LLE with now some heel cracking.    She has had no episodes of chest pain, palpitations, dizziness/lightheadedness, shortness of breath, or orthopnea.    Past Medical History   Diagnosis Date   • Backpain    • Hypertension    • Hypothyroid    • Glaucoma 5/3/2011   • Anticoagulant long-term use 1/12/2012   • Obesity 1/12/2012   • Menopause 1/12/2012   • Indigestion    • Unspecified urinary incontinence    • Pulmonary hypertension (CMS-HCC) 10/30/2012   • Hematoma complicating a procedure 11/3/2012   • Unspecified disorder of thyroid      hypothyroidism   • Pneumonia feb,2013   • High cholesterol    • PVC's (premature ventricular contractions) 1/12/2012   • Anesthesia      \"Tachycardia for 5 days after cataract surgery\"   • Arthritis      Knees, hips   • Depression    • Macular degeneration    • Lupus (CMS-HCC)    • CAD (coronary artery disease)    • Spinal stenosis of lumbar region at multiple levels    • Bronchitis Nov, 2013   • Unspecified cataract      repaired bilateral   • Senile nuclear sclerosis    • Mitral regurgitation 10/30/2012   • A-fib (CMS-HCC)    • Asthma      with pneumonia, nothing for 3 " "years   • Breath shortness      with exertion O2  2l/m PRN, hasnt used for 3 years   • Atrial fibrillation (CMS-HCC)      Past Surgical History   Procedure Laterality Date   • Tonsillectomy and adenoidectomy     • Recovery  11/30/2010     Performed by SURGERY, CATH-RECOVERY at SURGERY SAME DAY [facility] ORS   • Colonoscopy  2008     Normal    [facility]   • Abdominal hysterectomy total  April 15,1975     still has ovaries   • Other       Removed pins from left ankle   • Cataract phaco with iol  4/21/2015     Performed by Dmitry Bejarano M.D. at [facility]   • Cataract phaco with iol Right 5/5/2015     Procedure: IOL OD - STANDARD;  Surgeon: Dmitry Bejarano M.D.;  Location: SURGERY [facility];  Service:    • Open reduction       left ankle   • Knee arthroplasty total Left 5/28/2015     Procedure: KNEE ARTHROPLASTY TOTAL;  Surgeon: Heriberto Lozada M.D.;  Location: SURGERY [facility];  Service:    • Knee arthroplasty total Right 6/23/2016     Procedure: KNEE ARTHROPLASTY TOTAL;  Surgeon: Heriberto Lozada M.D.;  Location: SURGERY [facility];  Service:      Family History   Problem Relation Age of Onset   • Stroke Mother    • Diabetes Father    • Stroke Sister    • Heart Disease Brother    • Stroke Sister    • GI Daughter      Crohn's Disease   • Heart Disease Daughter      CHF   • Other Daughter      Chronic Pain--Lymphedema   • Cancer Paternal Aunt      History   Smoking status   • Never Smoker    Smokeless tobacco   • Never Used     Allergies   Allergen Reactions   • Amiodarone Hives     Throat and tongue itching   • Atorvastatin Calcium-Polysorbate 80      Muscle aches     • Augmentin      Unknown reaction   • Bactrim Shortness of Breath   • Cipro Xr Swelling   • Claritin-D [Loratadine-Pseudoephedrine] Palpitations   • Clotrimazole      Stinging / burning on chest   • Diltiazem      rash   • Flecainide      dizziness   • Keflex Unspecified     Pt states \"Unsure\".   • Lipitor " [Atorvastatin Calcium]      Muscle aches   • Metoprolol      Causes throat swelling   • Morphine      Hallucinations   • Mucinex      GI Distress     • Qvar [Beclomethasone Dipropionate]      Pressure on heart     • Tape Rash     Paper tape okay   • Tramadol      crying   • Vibramycin Shortness of Breath     Outpatient Encounter Prescriptions as of 8/14/2017   Medication Sig Dispense Refill   • Multiple Vitamins-Minerals (ICAPS AREDS 2) Cap Take 2 Caps by mouth every day. 100 Cap 3   • potassium chloride SA (KDUR) 20 MEQ Tab CR Take 3 Tabs by mouth 3 times a day. 180 Tab 3   • calcium carbonate (OS-BRAYDON 500) 500 MG Tab Take 1 Tab by mouth 2 times a day, with meals. 90 Tab 3   • estradiol (ESTRACE) 2 MG Tab TAKE ONE TABLET BY MOUTH EVERY DAY 90 Tab 1   • furosemide (LASIX) 40 MG Tab TAKE THREE TABLETS BY MOUTH DAILY 270 Tab 3   • ranitidine (ZANTAC) 150 MG Tab Take 1 Tab by mouth every day. 90 Tab 3   • lisinopril (PRINIVIL) 5 MG Tab Take 1 Tab by mouth every day. 90 Tab 2   • levothyroxine (SYNTHROID) 88 MCG Tab TAKE  ONE TABLET BY MOUTH EVERY MORNING BEFORE BREAKFAST 90 Tab 3   • magnesium oxide (MAG-OX) 400 MG Tab TAKE ONE TABLET BY MOUTH EVERY DAY 90 Tab 3   • warfarin (COUMADIN) 2.5 MG Tab Take 2.5 mg by mouth every day. Patient reports: Takes one 2.5 mg Sunday Monday Tuesday Wednesday Friday Saturday and on Thursday take 1 1/2 tab     • fexofenadine (ALLEGRA) 60 MG Tab Take 1 Tab by mouth as needed. For allergies  Indications: Hayfever 180 Tab 3   • digoxin (LANOXIN) 125 MCG Tab TAKE ONE TABLET BY MOUTH EVERY DAY 90 Tab 3   • sertraline (ZOLOFT) 25 MG tablet TAKE ONE TABLET BY MOUTH EVERY DAY 90 Tab 1   • verapamil (ISOPTIN) 120 MG Tab Take 1 Tab by mouth See Admin Instructions. Take 240 mg every morning, take 180 mg at noon, take 180 mg at 8pm 450 Tab 2   • albuterol 108 (90 BASE) MCG/ACT Aero Soln inhalation aerosol Inhale 2 Puffs by mouth every 6 hours as needed for Shortness of Breath.     • docusate sodium  "(COLACE) 100 MG Cap Take 200 mg by mouth every evening.     • Diclofenac Sodium 1 % Gel Apply 2 g to affected area(s) as needed (For joint pain).     • albuterol (PROVENTIL) 2.5mg/3ml Nebu Soln solution for nebulization 2.5 mg by Nebulization route every four hours as needed for Shortness of Breath.     • acetaminophen (TYLENOL) 500 MG TABS Take 1,000 mg by mouth 3 times a day. Indications: Pain     • MAGOX 400 400 (241.3 MG) MG TABS tablet Take 400 mg by mouth every day.  3   • sennosides-docusate sodium (SENOKOT-S) 8.6-50 MG tablet Take 3 Tabs by mouth every day. 30 Tab    • vitamin D (CHOLECALCIFEROL) 1000 UNIT TABS Take 2,000 Units by mouth every day.     • [DISCONTINUED] potassium chloride SA (KDUR) 20 MEQ Tab CR Take 3 Tabs by mouth every day. (Patient taking differently: Take 60 mEq by mouth 3 times a day.) 270 Tab 3   • [DISCONTINUED] Multiple Vitamins-Minerals (ICAPS AREDS 2) Cap Take 1 Cap by mouth every day. (Patient taking differently: Take 1 Cap by mouth every day. Pt takes the chewable tabs) 100 Cap 3     No facility-administered encounter medications on file as of 8/14/2017.     Review of Systems   Constitutional: Negative for fever and malaise/fatigue.   Respiratory: Negative for cough and shortness of breath.    Cardiovascular: Positive for leg swelling. Negative for chest pain, palpitations, orthopnea, claudication and PND.   Gastrointestinal: Negative for abdominal pain.   Musculoskeletal: Negative for myalgias.   Neurological: Negative for dizziness.   All other systems reviewed and are negative.       Objective:   /60 mmHg  Pulse 90  Ht 1.626 m (5' 4\")  Wt 83.008 kg (183 lb)  BMI 31.40 kg/m2  LMP 01/09/1975    Physical Exam   Constitutional: She is oriented to person, place, and time. She appears well-developed and well-nourished. No distress.   HENT:   Head: Normocephalic and atraumatic.   Eyes: EOM are normal.   Neck: Normal range of motion. No JVD present.   Cardiovascular: Normal " rate, normal heart sounds and intact distal pulses.  An irregularly irregular rhythm present.   No murmur heard.  Pulses:       Dorsalis pedis pulses are 1+ on the right side, and 1+ on the left side.   Pulmonary/Chest: Effort normal and breath sounds normal. No respiratory distress.   Abdominal: Soft. Bowel sounds are normal.   Musculoskeletal: Normal range of motion. She exhibits edema.   Neurological: She is alert and oriented to person, place, and time.   Skin: Skin is warm and dry.   Psychiatric: She has a normal mood and affect.   Nursing note and vitals reviewed.    2016 NUCLEAR IMAGING DESCRIPTION: The patient received IV lexiscan. The infusion was associated with shortness of breath. The baseline electrocardiogram is abnormal NSSTT. During the infusion, there were no electrocardiographic changes diagnostic of ischemia.  Myocardial perfusion images are to be reported in separate report.   IMPRESSIONS   No evidence of significant jeopardized viable myocardium or prior myocardial     infarction.   Normal left ventricular size, ejection fraction, and wall motion.     6/2017 ECHO CONCLUSIONS  Sinus rhythm.  Left ventricular ejection fraction is visually estimated to be 60%.  Normal left atrial size.  No significant valve disease or flow abnormalities.   Right ventricular systolic pressure is estimated to be 35 mmHg.  No prior study is available for comparison.     Lab Results   Component Value Date/Time    CHOLESTEROL,* 07/10/2017 09:25 AM    * 07/10/2017 09:25 AM    HDL 55 07/10/2017 09:25 AM    TRIGLYCERIDES 216* 07/10/2017 09:25 AM       Lab Results   Component Value Date/Time    SODIUM 139 07/10/2017 09:25 AM    POTASSIUM 4.2 07/10/2017 09:25 AM    CHLORIDE 100 07/10/2017 09:25 AM    CO2 28 07/10/2017 09:25 AM    GLUCOSE 103* 07/10/2017 09:25 AM    BUN 14 07/10/2017 09:25 AM    CREATININE 0.81 07/10/2017 09:25 AM    BUN-CREATININE RATIO 17 07/29/2010 12:00 AM    GLOM FILT RATE, EST >59  07/29/2010 12:00 AM     Lab Results   Component Value Date/Time    ALKALINE PHOSPHATASE 59 07/10/2017 09:25 AM    AST(SGOT) 14 07/10/2017 09:25 AM    ALT(SGPT) 16 07/10/2017 09:25 AM    TOTAL BILIRUBIN 0.6 07/10/2017 09:25 AM      Lab Results   Component Value Date/Time    WBC 13.9* 07/10/2017 09:25 AM    RBC 4.70 07/10/2017 09:25 AM    HEMOGLOBIN 15.0 07/10/2017 09:25 AM    HEMATOCRIT 46.1 07/10/2017 09:25 AM    MCV 98.1* 07/10/2017 09:25 AM    MCH 31.9 07/10/2017 09:25 AM    MCHC 32.5* 07/10/2017 09:25 AM    MPV 9.9 07/10/2017 09:25 AM      Lab Results   Component Value Date/Time    B NATRIURETIC PEPTIDE 48 01/08/2016 06:40 PM      Assessment:     1. Localized edema  potassium chloride SA (KDUR) 20 MEQ Tab CR   2. Essential hypertension  potassium chloride SA (KDUR) 20 MEQ Tab CR   3. Diastolic dysfunction  BTYPE NATRIURETIC PEPTIDE    COMP METABOLIC PANEL   4. Chronic anticoagulation     5. Permanent atrial fibrillation (CMS-HCC)     6. Obesity (BMI 30-39.9)     7. Coronary artery disease involving native coronary artery of native heart without angina pectoris     8. Mixed hyperlipidemia       Medical Decision Making:  Today's Assessment / Status / Plan:     1. Edema, new and worsening onset. Continue lasix to 80 mg QAM and 40 mg at noon. Continue K. Recommend checking CMP and BNP, hx of CHF in the past. Cut back on Na and fluid intake to 2L daily. Recommend daily leg elevation alongside compression stockings.     2. HTN, good control. Continue lisinopril and verapamil.    3. Diastolic dysfunction, leg edema worsening as above. Recent echo shows good function.     4. Afib, chronic rate controlled and asymptomatic. Continue dig and verapamil. Coumadin INR therapeutic with RCC- no bleeding.    5. Obesity, recommend lifestyle changes.    6. CAD, treat medically. Cholesterol not ideal with CAD-intolerant to statin's. Follow clinically. Last nuclear imaging in '16 negative for ischemia. No ASA with coumadin.    7.  HLD, poor control. Intolerant to statin's. Consider PCSK9 inhibitors in the future if wanted.    FU in clinic in 3 months with LA; will call with lab results and updates on patient status in 1-2 weeks    Patient verbalizes understanding and agrees with the plan of care.     Collaborating MD: Angelika DYER        No Recipients

## 2017-08-14 NOTE — PROGRESS NOTES
"Subjective:   Donte Magaña is a 76 y.o. female who presents today for follow up concerning new worsening onset of lower extremity edema associated with heel wounds.    She is a patient of HERMANN Haywood in our office. Hx of CAD (medical management only), chronic afib on coumadin, HTN, hypothyroid, lupus, obesity, and HLD.    She is overall feeling okay but her feet continue to swell. She knows she doesn't eat the healthiest, eats potato chips every day. She is not an ideal weight for her height.     She has no other complaints except her LLE with now some heel cracking.    She has had no episodes of chest pain, palpitations, dizziness/lightheadedness, shortness of breath, or orthopnea.    Past Medical History   Diagnosis Date   • Backpain    • Hypertension    • Hypothyroid    • Glaucoma 5/3/2011   • Anticoagulant long-term use 1/12/2012   • Obesity 1/12/2012   • Menopause 1/12/2012   • Indigestion    • Unspecified urinary incontinence    • Pulmonary hypertension (CMS-[facility]) 10/30/2012   • Hematoma complicating a procedure 11/3/2012   • Unspecified disorder of thyroid      hypothyroidism   • Pneumonia feb,2013   • High cholesterol    • PVC's (premature ventricular contractions) 1/12/2012   • Anesthesia      \"Tachycardia for 5 days after cataract surgery\"   • Arthritis      Knees, hips   • Depression    • Macular degeneration    • Lupus (CMS-HCC)    • CAD (coronary artery disease)    • Spinal stenosis of lumbar region at multiple levels    • Bronchitis Nov, 2013   • Unspecified cataract      repaired bilateral   • Senile nuclear sclerosis    • Mitral regurgitation 10/30/2012   • A-fib (CMS-HCC)    • Asthma      with pneumonia, nothing for 3 years   • Breath shortness      with exertion O2  2l/m PRN, hasnt used for 3 years   • Atrial fibrillation (CMS-HCC)      Past Surgical History   Procedure Laterality Date   • Tonsillectomy and adenoidectomy     • Recovery  11/30/2010     Performed by SURGERY, CATH-RECOVERY " "at SURGERY SAME DAY [facility]   • Colonoscopy  2008     Normal    [facility]   • Abdominal hysterectomy total  April 15,1975     still has ovaries   • Other       Removed pins from left ankle   • Cataract phaco with iol  4/21/2015     Performed by Dmitry Bejarano M.D. at [facility]   • Cataract phaco with iol Right 5/5/2015     Procedure: IOL OD - STANDARD;  Surgeon: Dmitry Bejarano M.D.;  Location: SURGERY [facility];  Service:    • Open reduction       left ankle   • Knee arthroplasty total Left 5/28/2015     Procedure: KNEE ARTHROPLASTY TOTAL;  Surgeon: Heriberto Lozada M.D.;  Location: SURGERY [facility];  Service:    • Knee arthroplasty total Right 6/23/2016     Procedure: KNEE ARTHROPLASTY TOTAL;  Surgeon: Heriberto Lozada M.D.;  Location: SURGERY [facility];  Service:      Family History   Problem Relation Age of Onset   • Stroke Mother    • Diabetes Father    • Stroke Sister    • Heart Disease Brother    • Stroke Sister    • GI Daughter      Crohn's Disease   • Heart Disease Daughter      CHF   • Other Daughter      Chronic Pain--Lymphedema   • Cancer Paternal Aunt      History   Smoking status   • Never Smoker    Smokeless tobacco   • Never Used     Allergies   Allergen Reactions   • Amiodarone Hives     Throat and tongue itching   • Atorvastatin Calcium-Polysorbate 80      Muscle aches     • Augmentin      Unknown reaction   • Bactrim Shortness of Breath   • Cipro Xr Swelling   • Claritin-D [Loratadine-Pseudoephedrine] Palpitations   • Clotrimazole      Stinging / burning on chest   • Diltiazem      rash   • Flecainide      dizziness   • Keflex Unspecified     Pt states \"Unsure\".   • Lipitor [Atorvastatin Calcium]      Muscle aches   • Metoprolol      Causes throat swelling   • Morphine      Hallucinations   • Mucinex      GI Distress     • Qvar [Beclomethasone Dipropionate]      Pressure on heart     • Tape Rash     Paper tape okay   • Tramadol      crying   • " Vibramycin Shortness of Breath     Outpatient Encounter Prescriptions as of 8/14/2017   Medication Sig Dispense Refill   • Multiple Vitamins-Minerals (ICAPS AREDS 2) Cap Take 2 Caps by mouth every day. 100 Cap 3   • potassium chloride SA (KDUR) 20 MEQ Tab CR Take 3 Tabs by mouth 3 times a day. 180 Tab 3   • calcium carbonate (OS-BRAYDON 500) 500 MG Tab Take 1 Tab by mouth 2 times a day, with meals. 90 Tab 3   • estradiol (ESTRACE) 2 MG Tab TAKE ONE TABLET BY MOUTH EVERY DAY 90 Tab 1   • furosemide (LASIX) 40 MG Tab TAKE THREE TABLETS BY MOUTH DAILY 270 Tab 3   • ranitidine (ZANTAC) 150 MG Tab Take 1 Tab by mouth every day. 90 Tab 3   • lisinopril (PRINIVIL) 5 MG Tab Take 1 Tab by mouth every day. 90 Tab 2   • levothyroxine (SYNTHROID) 88 MCG Tab TAKE  ONE TABLET BY MOUTH EVERY MORNING BEFORE BREAKFAST 90 Tab 3   • magnesium oxide (MAG-OX) 400 MG Tab TAKE ONE TABLET BY MOUTH EVERY DAY 90 Tab 3   • warfarin (COUMADIN) 2.5 MG Tab Take 2.5 mg by mouth every day. Patient reports: Takes one 2.5 mg Sunday Monday Tuesday Wednesday Friday Saturday and on Thursday take 1 1/2 tab     • fexofenadine (ALLEGRA) 60 MG Tab Take 1 Tab by mouth as needed. For allergies  Indications: Hayfever 180 Tab 3   • digoxin (LANOXIN) 125 MCG Tab TAKE ONE TABLET BY MOUTH EVERY DAY 90 Tab 3   • sertraline (ZOLOFT) 25 MG tablet TAKE ONE TABLET BY MOUTH EVERY DAY 90 Tab 1   • verapamil (ISOPTIN) 120 MG Tab Take 1 Tab by mouth See Admin Instructions. Take 240 mg every morning, take 180 mg at noon, take 180 mg at 8pm 450 Tab 2   • albuterol 108 (90 BASE) MCG/ACT Aero Soln inhalation aerosol Inhale 2 Puffs by mouth every 6 hours as needed for Shortness of Breath.     • docusate sodium (COLACE) 100 MG Cap Take 200 mg by mouth every evening.     • Diclofenac Sodium 1 % Gel Apply 2 g to affected area(s) as needed (For joint pain).     • albuterol (PROVENTIL) 2.5mg/3ml Nebu Soln solution for nebulization 2.5 mg by Nebulization route every four hours as needed  "for Shortness of Breath.     • acetaminophen (TYLENOL) 500 MG TABS Take 1,000 mg by mouth 3 times a day. Indications: Pain     • MAGOX 400 400 (241.3 MG) MG TABS tablet Take 400 mg by mouth every day.  3   • sennosides-docusate sodium (SENOKOT-S) 8.6-50 MG tablet Take 3 Tabs by mouth every day. 30 Tab    • vitamin D (CHOLECALCIFEROL) 1000 UNIT TABS Take 2,000 Units by mouth every day.     • [DISCONTINUED] potassium chloride SA (KDUR) 20 MEQ Tab CR Take 3 Tabs by mouth every day. (Patient taking differently: Take 60 mEq by mouth 3 times a day.) 270 Tab 3   • [DISCONTINUED] Multiple Vitamins-Minerals (ICAPS AREDS 2) Cap Take 1 Cap by mouth every day. (Patient taking differently: Take 1 Cap by mouth every day. Pt takes the chewable tabs) 100 Cap 3     No facility-administered encounter medications on file as of 8/14/2017.     Review of Systems   Constitutional: Negative for fever and malaise/fatigue.   Respiratory: Negative for cough and shortness of breath.    Cardiovascular: Positive for leg swelling. Negative for chest pain, palpitations, orthopnea, claudication and PND.   Gastrointestinal: Negative for abdominal pain.   Musculoskeletal: Negative for myalgias.   Neurological: Negative for dizziness.   All other systems reviewed and are negative.       Objective:   /60 mmHg  Pulse 90  Ht 1.626 m (5' 4\")  Wt 83.008 kg (183 lb)  BMI 31.40 kg/m2  LMP 01/09/1975    Physical Exam   Constitutional: She is oriented to person, place, and time. She appears well-developed and well-nourished. No distress.   HENT:   Head: Normocephalic and atraumatic.   Eyes: EOM are normal.   Neck: Normal range of motion. No JVD present.   Cardiovascular: Normal rate, normal heart sounds and intact distal pulses.  An irregularly irregular rhythm present.   No murmur heard.  Pulses:       Dorsalis pedis pulses are 1+ on the right side, and 1+ on the left side.   Pulmonary/Chest: Effort normal and breath sounds normal. No respiratory " distress.   Abdominal: Soft. Bowel sounds are normal.   Musculoskeletal: Normal range of motion. She exhibits edema.   Neurological: She is alert and oriented to person, place, and time.   Skin: Skin is warm and dry.   Psychiatric: She has a normal mood and affect.   Nursing note and vitals reviewed.    2016 NUCLEAR IMAGING DESCRIPTION: The patient received IV lexiscan. The infusion was associated with shortness of breath. The baseline electrocardiogram is abnormal NSSTT. During the infusion, there were no electrocardiographic changes diagnostic of ischemia.  Myocardial perfusion images are to be reported in separate report.   IMPRESSIONS   No evidence of significant jeopardized viable myocardium or prior myocardial     infarction.   Normal left ventricular size, ejection fraction, and wall motion.     6/2017 ECHO CONCLUSIONS  Sinus rhythm.  Left ventricular ejection fraction is visually estimated to be 60%.  Normal left atrial size.  No significant valve disease or flow abnormalities.   Right ventricular systolic pressure is estimated to be 35 mmHg.  No prior study is available for comparison.     Lab Results   Component Value Date/Time    CHOLESTEROL,* 07/10/2017 09:25 AM    * 07/10/2017 09:25 AM    HDL 55 07/10/2017 09:25 AM    TRIGLYCERIDES 216* 07/10/2017 09:25 AM       Lab Results   Component Value Date/Time    SODIUM 139 07/10/2017 09:25 AM    POTASSIUM 4.2 07/10/2017 09:25 AM    CHLORIDE 100 07/10/2017 09:25 AM    CO2 28 07/10/2017 09:25 AM    GLUCOSE 103* 07/10/2017 09:25 AM    BUN 14 07/10/2017 09:25 AM    CREATININE 0.81 07/10/2017 09:25 AM    BUN-CREATININE RATIO 17 07/29/2010 12:00 AM    GLOM FILT RATE, EST >59 07/29/2010 12:00 AM     Lab Results   Component Value Date/Time    ALKALINE PHOSPHATASE 59 07/10/2017 09:25 AM    AST(SGOT) 14 07/10/2017 09:25 AM    ALT(SGPT) 16 07/10/2017 09:25 AM    TOTAL BILIRUBIN 0.6 07/10/2017 09:25 AM      Lab Results   Component Value Date/Time    WBC 13.9*  07/10/2017 09:25 AM    RBC 4.70 07/10/2017 09:25 AM    HEMOGLOBIN 15.0 07/10/2017 09:25 AM    HEMATOCRIT 46.1 07/10/2017 09:25 AM    MCV 98.1* 07/10/2017 09:25 AM    MCH 31.9 07/10/2017 09:25 AM    MCHC 32.5* 07/10/2017 09:25 AM    MPV 9.9 07/10/2017 09:25 AM      Lab Results   Component Value Date/Time    B NATRIURETIC PEPTIDE 48 01/08/2016 06:40 PM      Assessment:     1. Localized edema  potassium chloride SA (KDUR) 20 MEQ Tab CR   2. Essential hypertension  potassium chloride SA (KDUR) 20 MEQ Tab CR   3. Diastolic dysfunction  BTYPE NATRIURETIC PEPTIDE    COMP METABOLIC PANEL   4. Chronic anticoagulation     5. Permanent atrial fibrillation (CMS-HCC)     6. Obesity (BMI 30-39.9)     7. Coronary artery disease involving native coronary artery of native heart without angina pectoris     8. Mixed hyperlipidemia       Medical Decision Making:  Today's Assessment / Status / Plan:     1. Edema, new and worsening onset. Continue lasix to 80 mg QAM and 40 mg at noon. Continue K. Recommend checking CMP and BNP, hx of CHF in the past. Cut back on Na and fluid intake to 2L daily. Recommend daily leg elevation alongside compression stockings.     2. HTN, good control. Continue lisinopril and verapamil.    3. Diastolic dysfunction, leg edema worsening as above. Recent echo shows good function.     4. Afib, chronic rate controlled and asymptomatic. Continue dig and verapamil. Coumadin INR therapeutic with RCC- no bleeding.    5. Obesity, recommend lifestyle changes.    6. CAD, treat medically. Cholesterol not ideal with CAD-intolerant to statin's. Follow clinically. Last nuclear imaging in '16 negative for ischemia. No ASA with coumadin.    7. HLD, poor control. Intolerant to statin's. Consider PCSK9 inhibitors in the future if wanted.    FU in clinic in 3 months with LA; will call with lab results and updates on patient status in 1-2 weeks    Patient verbalizes understanding and agrees with the plan of care.      Collaborating MD: Angelika DYER

## 2017-08-15 ENCOUNTER — TELEPHONE (OUTPATIENT)
Dept: CARDIOLOGY | Facility: MEDICAL CENTER | Age: 77
End: 2017-08-15

## 2017-08-15 NOTE — TELEPHONE ENCOUNTER
S/w pt about lab results as reviewed by Lisa FERGUSON. Pt denies any questions at this time and will FU with office in two weeks.     BTYPE NATRIURETIC PEPTIDE   Status: Final result     Visible to patient:  Paulette     Dx:  Diastolic dysfunction               Notes Recorded by VICTORIANO Sanchez on 8/15/2017 at 8:56 AM  BNP and CMP WNL. Recommend to stay on lasix dosing as stated in FU apt yesterday. Please have her call me in 1-2 weeks for update on how she is doing. SC

## 2017-08-23 ENCOUNTER — ANTICOAGULATION MONITORING (OUTPATIENT)
Dept: VASCULAR LAB | Facility: MEDICAL CENTER | Age: 77
End: 2017-08-23

## 2017-08-23 DIAGNOSIS — Z79.01 CHRONIC ANTICOAGULATION: ICD-10-CM

## 2017-08-23 LAB — INR PPP: 2.5 (ref 2–3.5)

## 2017-08-23 NOTE — PROGRESS NOTES
Anticoagulation Summary as of 8/23/2017     INR goal 2.0-3.0   Selected INR 2.5 (8/23/2017)   Maintenance plan 3.75 mg (2.5 mg x 1.5) on Mon; 2.5 mg (2.5 mg x 1) all other days   Weekly total 18.75 mg   Plan last modified Vladimir Taylor PHARMD (7/12/2017)   Next INR check 9/6/2017   Target end date Indefinite    Indications   Atrial fibrillation (CMS-HCC) (Resolved) [I48.91]  Chronic anticoagulation [Z79.01]         Anticoagulation Episode Summary     INR check location Home Draw    Preferred lab     Send INR reminders to     Comments Winston       Anticoagulation Care Providers     Provider Role Specialty Phone number    Lizzy Haywood M.D. Referring Cardiology [phone]    [facility] Responsible  [phone]    Vladimir Taylor, GILMA Responsible          Anticoagulation Patient Findings    Left voicemail message to report a therapeutic INR of 2.5.  Pt to continue with current warfarin dosing regimen. Requested pt contact the clinic for any s/s of unusual bleeding, bruising, clotting or any changes to diet or medication. FU 2 weeks.  Vladimir Taylor, GILMA

## 2017-09-05 DIAGNOSIS — N95.8 OTHER SPECIFIED MENOPAUSAL AND POSTMENOPAUSAL DISORDER: ICD-10-CM

## 2017-09-06 ENCOUNTER — ANTICOAGULATION MONITORING (OUTPATIENT)
Dept: VASCULAR LAB | Facility: MEDICAL CENTER | Age: 77
End: 2017-09-06

## 2017-09-06 DIAGNOSIS — Z79.01 CHRONIC ANTICOAGULATION: ICD-10-CM

## 2017-09-06 LAB — INR PPP: 2.3 (ref 2–3.5)

## 2017-09-06 NOTE — PROGRESS NOTES
OP Anticoagulation Telephone Note    Date: 9/6/2017  Anticoagulation Summary  As of 9/6/2017    INR goal:   2.0-3.0   TTR:   70.3 % (2.2 y)   Today's INR:   2.3   Maintenance plan:   3.75 mg (2.5 mg x 1.5) on Mon; 2.5 mg (2.5 mg x 1) all other days   Weekly total:   18.75 mg   No change documented:   Linus Cooley Ass't   Plan last modified:   Peter FloresD (7/12/2017)   Next INR check:   9/20/2017   Target end date:   Indefinite    Indications    Atrial fibrillation (CMS-HCC) (Resolved) [I48.91]  Chronic anticoagulation [Z79.01]             Anticoagulation Episode Summary     INR check location:   Home Draw    Preferred lab:       Send INR reminders to:       Comments:   Winston YEAGER      Anticoagulation Care Providers     Provider Role Specialty Phone number    Lizzy Haywood M.D. Referring Cardiology [phone]    [facility] Anticoagulation Services Responsible  [phone]    Vladimir Taylor, PeterD Responsible          Anticoagulation Patient Findings  Patient Findings     Negatives:   Signs/symptoms of thrombosis, Signs/symptoms of bleeding, Laboratory test error suspected, Change in health, Change in alcohol use, Change in activity, Upcoming invasive procedure, Emergency department visit, Upcoming dental procedure, Missed doses, Extra doses, Change in medications, Change in diet/appetite, Hospital admission, Bruising, Other complaints      Plan:  Spoke with patient on the phone. Patient is therapeutic today. Patient denies any changes in medications or diet. Patient denies any signs or symptoms of bleeding or clotting. Instructed patient to call clinic if any unusual bleeding or bruising occurs. Will continue dosing as outlined above. Will follow-up with patient in 2 weeks.    Roxana Mercedes, Medical Assistant

## 2017-09-20 LAB — INR PPP: 2.6 (ref 2–3.5)

## 2017-09-23 DIAGNOSIS — I10 ESSENTIAL HYPERTENSION: ICD-10-CM

## 2017-09-25 ENCOUNTER — ANTICOAGULATION MONITORING (OUTPATIENT)
Dept: VASCULAR LAB | Facility: MEDICAL CENTER | Age: 77
End: 2017-09-25

## 2017-09-25 DIAGNOSIS — Z79.01 CHRONIC ANTICOAGULATION: ICD-10-CM

## 2017-09-25 RX ORDER — VERAPAMIL HYDROCHLORIDE 120 MG/1
120 TABLET, FILM COATED ORAL SEE ADMIN INSTRUCTIONS
Qty: 450 TAB | Refills: 1 | Status: ON HOLD | OUTPATIENT
Start: 2017-09-25 | End: 2017-12-13

## 2017-09-25 NOTE — PROGRESS NOTES
Anticoagulation Summary  As of 9/25/2017    INR goal:   2.0-3.0   TTR:   70.8 % (2.3 y)   Today's INR:   2.6 (9/20/2017)   Maintenance plan:   3.75 mg (2.5 mg x 1.5) on Mon; 2.5 mg (2.5 mg x 1) all other days   Weekly total:   18.75 mg   Plan last modified:   Vladimir Taylor, PharmD (7/12/2017)   Next INR check:   10/4/2017   Target end date:   Indefinite    Indications    Atrial fibrillation (CMS-HCC) (Resolved) [I48.91]  Chronic anticoagulation [Z79.01]             Anticoagulation Episode Summary     INR check location:   Home Draw    Preferred lab:       Send INR reminders to:       Comments:   Winston YEAGER      Anticoagulation Care Providers     Provider Role Specialty Phone number    Lizzy Haywood M.D. Referring Cardiology [phone]    [facility] Anticoagulation Services Responsible  [phone]    Vladimir Taylor, PharmD Responsible          See note by Bhavana Coleman, PharmD

## 2017-09-27 ENCOUNTER — APPOINTMENT (RX ONLY)
Dept: URBAN - METROPOLITAN AREA CLINIC 4 | Facility: CLINIC | Age: 77
Setting detail: DERMATOLOGY
End: 2017-09-27

## 2017-09-27 DIAGNOSIS — L82.1 OTHER SEBORRHEIC KERATOSIS: ICD-10-CM

## 2017-09-27 DIAGNOSIS — L81.4 OTHER MELANIN HYPERPIGMENTATION: ICD-10-CM

## 2017-09-27 DIAGNOSIS — D18.0 HEMANGIOMA: ICD-10-CM

## 2017-09-27 DIAGNOSIS — Z85.828 PERSONAL HISTORY OF OTHER MALIGNANT NEOPLASM OF SKIN: ICD-10-CM

## 2017-09-27 DIAGNOSIS — D22 MELANOCYTIC NEVI: ICD-10-CM

## 2017-09-27 DIAGNOSIS — Z71.89 OTHER SPECIFIED COUNSELING: ICD-10-CM

## 2017-09-27 DIAGNOSIS — L57.0 ACTINIC KERATOSIS: ICD-10-CM

## 2017-09-27 PROBLEM — E78.5 HYPERLIPIDEMIA, UNSPECIFIED: Status: ACTIVE | Noted: 2017-09-27

## 2017-09-27 PROBLEM — I10 ESSENTIAL (PRIMARY) HYPERTENSION: Status: ACTIVE | Noted: 2017-09-27

## 2017-09-27 PROBLEM — D18.01 HEMANGIOMA OF SKIN AND SUBCUTANEOUS TISSUE: Status: ACTIVE | Noted: 2017-09-27

## 2017-09-27 PROBLEM — D22.5 MELANOCYTIC NEVI OF TRUNK: Status: ACTIVE | Noted: 2017-09-27

## 2017-09-27 PROCEDURE — ? ADDITIONAL NOTES

## 2017-09-27 PROCEDURE — 17000 DESTRUCT PREMALG LESION: CPT

## 2017-09-27 PROCEDURE — 99213 OFFICE O/P EST LOW 20 MIN: CPT | Mod: 25

## 2017-09-27 PROCEDURE — ? COUNSELING

## 2017-09-27 PROCEDURE — 17003 DESTRUCT PREMALG LES 2-14: CPT

## 2017-09-27 PROCEDURE — ? LIQUID NITROGEN

## 2017-09-27 ASSESSMENT — LOCATION SIMPLE DESCRIPTION DERM
LOCATION SIMPLE: RIGHT POSTERIOR UPPER ARM
LOCATION SIMPLE: NOSE
LOCATION SIMPLE: RIGHT HAND
LOCATION SIMPLE: CHEST
LOCATION SIMPLE: LEFT TEMPLE
LOCATION SIMPLE: LEFT CHEEK
LOCATION SIMPLE: LEFT HAND
LOCATION SIMPLE: LEFT POSTERIOR UPPER ARM
LOCATION SIMPLE: LEFT UPPER BACK
LOCATION SIMPLE: SCALP
LOCATION SIMPLE: RIGHT CHEEK
LOCATION SIMPLE: UPPER BACK

## 2017-09-27 ASSESSMENT — LOCATION DETAILED DESCRIPTION DERM
LOCATION DETAILED: NASAL DORSUM
LOCATION DETAILED: RIGHT DISTAL POSTERIOR UPPER ARM
LOCATION DETAILED: LEFT DISTAL POSTERIOR UPPER ARM
LOCATION DETAILED: INFERIOR THORACIC SPINE
LOCATION DETAILED: LEFT SUPERIOR MEDIAL MALAR CHEEK
LOCATION DETAILED: LEFT SUPERIOR UPPER BACK
LOCATION DETAILED: LEFT LATERAL TEMPLE
LOCATION DETAILED: LEFT INFERIOR POSTAURICULAR SKIN
LOCATION DETAILED: RIGHT MEDIAL MALAR CHEEK
LOCATION DETAILED: RIGHT LATERAL SUPERIOR CHEST
LOCATION DETAILED: LEFT RADIAL DORSAL HAND
LOCATION DETAILED: RIGHT INFERIOR CENTRAL MALAR CHEEK
LOCATION DETAILED: RIGHT CENTRAL MALAR CHEEK
LOCATION DETAILED: RIGHT RADIAL DORSAL HAND

## 2017-09-27 ASSESSMENT — LOCATION ZONE DERM
LOCATION ZONE: TRUNK
LOCATION ZONE: HAND
LOCATION ZONE: ARM
LOCATION ZONE: SCALP
LOCATION ZONE: FACE
LOCATION ZONE: NOSE

## 2017-09-27 NOTE — HPI: SECONDARY COMPLAINT
Additional History: She states it has resolved, she has had edema in her feet, which she sees her PCP for treatment for this with Lasix

## 2017-10-04 ENCOUNTER — ANTICOAGULATION MONITORING (OUTPATIENT)
Dept: VASCULAR LAB | Facility: MEDICAL CENTER | Age: 77
End: 2017-10-04

## 2017-10-04 DIAGNOSIS — Z79.01 CHRONIC ANTICOAGULATION: ICD-10-CM

## 2017-10-04 LAB — INR PPP: 3 (ref 2–3.5)

## 2017-10-04 NOTE — PROGRESS NOTES
Anticoagulation Summary  As of 10/4/2017    INR goal:   2.0-3.0   TTR:   71.3 % (2.3 y)   Today's INR:   3.0   Maintenance plan:   2.5 mg (2.5 mg x 1) every day   Weekly total:   17.5 mg   Plan last modified:   Massiel Coleman PharmD (10/4/2017)   Next INR check:   10/18/2017   Target end date:   Indefinite    Indications    Atrial fibrillation (CMS-HCC) (Resolved) [I48.91]  Chronic anticoagulation [Z79.01]             Anticoagulation Episode Summary     INR check location:   Home Draw    Preferred lab:       Send INR reminders to:       Comments:   Winston       Anticoagulation Care Providers     Provider Role Specialty Phone number    Lizzy Haywood M.D. Referring Cardiology [phone]    [facility] Anticoagulation [facility] Responsible  [phone]    Vladimir Taylor, PharmD Responsible          spoke with Donte to report a therapeutic INR of 3.0.  Pt requesting to decrease weekly dose as she is at the top of normal.  I agree. Will reduce to 17.5mg/week. Pt denies any unusual s/s of bleeding, bruising, clotting or any changes to diet or medications.  Follow up in 2 weeks.    Massiel Coleman, PharmD

## 2017-10-18 ENCOUNTER — ANTICOAGULATION MONITORING (OUTPATIENT)
Dept: VASCULAR LAB | Facility: MEDICAL CENTER | Age: 77
End: 2017-10-18

## 2017-10-18 DIAGNOSIS — Z79.01 CHRONIC ANTICOAGULATION: ICD-10-CM

## 2017-10-18 LAB — INR PPP: 2 (ref 2–3.5)

## 2017-10-18 NOTE — PROGRESS NOTES
Anticoagulation Summary  As of 10/18/2017    INR goal:   2.0-3.0   TTR:   71.8 % (2.4 y)   Today's INR:   2.0   Maintenance plan:   3.75 mg (2.5 mg x 1.5) on Wed; 2.5 mg (2.5 mg x 1) all other days   Weekly total:   18.75 mg   Plan last modified:   Vladimir Taylor PharmD (10/18/2017)   Next INR check:   11/1/2017   Target end date:   Indefinite    Indications    Atrial fibrillation (CMS-HCC) (Resolved) [I48.91]  Chronic anticoagulation [Z79.01]             Anticoagulation Episode Summary     INR check location:   Home Draw    Preferred lab:       Send INR reminders to:       Comments:   Winston YEAGER      Anticoagulation Care Providers     Provider Role Specialty Phone number    Lizzy Haywood M.D. Referring Cardiology [phone]    [facility] Responsible  [phone]    Vladimir Taylor, PharmD Responsible          Anticoagulation Patient Findings  Patient Findings     Positives:   Other complaints    Negatives:   Signs/symptoms of thrombosis, Signs/symptoms of bleeding, Laboratory test error suspected, Change in health, Change in alcohol use, Change in activity, Upcoming invasive procedure, Emergency department visit, Upcoming dental procedure, Missed doses, Extra doses, Change in medications, Change in diet/appetite, Hospital admission, Bruising    Comments:   In/out of a-fib past 10 days        Spoke with patient today regarding therapeutic INR of 2.0.  Patient denies any signs/symptoms of bruising or bleeding or any changes in diet and medications.  Instructed patient to call clinic with any questions or concerns.  Patient concerned with sharp decrease in INR since last encounter.  Will have her go back to previously stable warfarin regimen as detailed above.  Follow up in 2 weeks.    Vladimir Taylor, PeterD

## 2017-10-25 ENCOUNTER — PATIENT MESSAGE (OUTPATIENT)
Dept: CARDIOLOGY | Facility: MEDICAL CENTER | Age: 77
End: 2017-10-25

## 2017-10-25 NOTE — TELEPHONE ENCOUNTER
Lizzy Haywood M.D.  Aylin Lou R.N.   Phone Number: [phone]             Can you let Donte know - really really recc seeing CR DYER or MEGAN brown!      S/w pt, agrees to see CR.  Scheduled pt to see JESSIKA, 10/31/17 at 11:20am.

## 2017-10-27 ENCOUNTER — PATIENT OUTREACH (OUTPATIENT)
Dept: HEALTH INFORMATION MANAGEMENT | Facility: OTHER | Age: 77
End: 2017-10-27

## 2017-10-27 NOTE — PROGRESS NOTES
Attempt #:1    WebIZ Checked & Epic Updated: Yes  · WebIZ Recommendations: Patient is up to date on all vaccines  · Is patient due for Tdap? NO  · Is patient due for Shingles? NO  [facility] Verified: yes  Verify PCP: yes    Communication Preference Obtained: yes     Review Care Team: yes    Annual Wellness Visit Scheduling  1. Scheduling Status:Scheduled       Care Gap Scheduling (Attempt to Schedule EACH Overdue Care Gap!)     Health Maintenance Due   Topic Date Due   • Annual Wellness Visit  1940        Scheduled patient for Annual Wellness Visit       [employer] Activation: already active  [employer] Ashwin: no  Virtual Visits: no  Opt In to Text Messages: no

## 2017-10-31 ENCOUNTER — ANTICOAGULATION MONITORING (OUTPATIENT)
Dept: VASCULAR LAB | Facility: MEDICAL CENTER | Age: 77
End: 2017-10-31

## 2017-10-31 ENCOUNTER — OFFICE VISIT (OUTPATIENT)
Dept: CARDIOLOGY | Facility: MEDICAL CENTER | Age: 77
End: 2017-10-31
Payer: MEDICARE

## 2017-10-31 VITALS
SYSTOLIC BLOOD PRESSURE: 132 MMHG | DIASTOLIC BLOOD PRESSURE: 82 MMHG | BODY MASS INDEX: 30.22 KG/M2 | HEIGHT: 64 IN | WEIGHT: 177 LBS | HEART RATE: 139 BPM | OXYGEN SATURATION: 95 %

## 2017-10-31 DIAGNOSIS — Z79.01 CHRONIC ANTICOAGULATION: ICD-10-CM

## 2017-10-31 DIAGNOSIS — E66.9 OBESITY (BMI 30-39.9): ICD-10-CM

## 2017-10-31 DIAGNOSIS — I48.19 PERSISTENT ATRIAL FIBRILLATION (HCC): ICD-10-CM

## 2017-10-31 DIAGNOSIS — I25.10 CORONARY ARTERY DISEASE INVOLVING NATIVE CORONARY ARTERY OF NATIVE HEART WITHOUT ANGINA PECTORIS: ICD-10-CM

## 2017-10-31 DIAGNOSIS — I48.0 PAF (PAROXYSMAL ATRIAL FIBRILLATION) (HCC): ICD-10-CM

## 2017-10-31 DIAGNOSIS — I10 ESSENTIAL HYPERTENSION: ICD-10-CM

## 2017-10-31 DIAGNOSIS — I49.3 PVC (PREMATURE VENTRICULAR CONTRACTION): ICD-10-CM

## 2017-10-31 LAB
EKG IMPRESSION: NORMAL
INR PPP: 1.7 (ref 2–3.5)

## 2017-10-31 PROCEDURE — 93000 ELECTROCARDIOGRAM COMPLETE: CPT | Performed by: INTERNAL MEDICINE

## 2017-10-31 PROCEDURE — 99205 OFFICE O/P NEW HI 60 MIN: CPT | Performed by: INTERNAL MEDICINE

## 2017-10-31 ASSESSMENT — ENCOUNTER SYMPTOMS
CHILLS: 0
NERVOUS/ANXIOUS: 0
BLURRED VISION: 0
EYE DISCHARGE: 0
HEMOPTYSIS: 0
NAUSEA: 0
MYALGIAS: 0
DEPRESSION: 0
BRUISES/BLEEDS EASILY: 0
WHEEZING: 0
WEAKNESS: 1
FEVER: 0
LOSS OF CONSCIOUSNESS: 0
SPEECH CHANGE: 0
EYE PAIN: 0
COUGH: 0
PALPITATIONS: 1
VOMITING: 0
ABDOMINAL PAIN: 0

## 2017-10-31 NOTE — LETTER
"     [facility]   [address]  [place] [address]  Phone: [phone]  Fax: [phone]              Donte Magaña  1940    Encounter Date: 10/31/2017    Bo Matthews M.D.          PROGRESS NOTE:  Subjective:   Donte Magaña is a 76 y.o. female who presents today being seen in consult on request of Dr TERI Haywood secondary to recurrent PAF and now persistent a fib with difficulty with rate control and fatigue. Mild FERRO. No chest pain. No syncope. Multiple AA meds tried in past with multiple intolerance. On coumadin. Moderate CAD.     Past Medical History:   Diagnosis Date   • A-fib (CMS-[facility])    • Anesthesia     \"Tachycardia for 5 days after cataract surgery\"   • Anticoagulant long-term use 1/12/2012   • Arthritis     Knees, hips   • Asthma     with pneumonia, nothing for 3 years   • Atrial fibrillation (CMS-[facility])    • Backpain    • Breath shortness     with exertion O2  2l/m PRN, hasnt used for 3 years   • Bronchitis Nov, 2013   • CAD (coronary artery disease)    • Depression    • Glaucoma 5/3/2011   • Hematoma complicating a procedure 11/3/2012   • High cholesterol    • Hypertension    • Hypothyroid    • Indigestion    • Lupus    • Macular degeneration    • Menopause 1/12/2012   • Mitral regurgitation 10/30/2012   • Obesity 1/12/2012   • Pneumonia feb,2013   • Pulmonary hypertension 10/30/2012   • PVC's (premature ventricular contractions) 1/12/2012   • Senile nuclear sclerosis    • Spinal stenosis of lumbar region at multiple levels    • Unspecified cataract     repaired bilateral   • Unspecified disorder of thyroid     hypothyroidism   • Unspecified urinary incontinence      Past Surgical History:   Procedure Laterality Date   • KNEE ARTHROPLASTY TOTAL Right 6/23/2016    Procedure: KNEE ARTHROPLASTY TOTAL;  Surgeon: Heriberto Lozada M.D.;  Location: SURGERY [facility];  Service:    • KNEE ARTHROPLASTY TOTAL Left 5/28/2015    Procedure: KNEE " "ARTHROPLASTY TOTAL;  Surgeon: Heriberto Lozada M.D.;  Location: SURGERY [facility] ORS;  Service:    • CATARACT PHACO WITH IOL Right 5/5/2015    Procedure: IOL OD - STANDARD;  Surgeon: Dmitry Bejarano M.D.;  Location: SURGERY [facility];  Service:    • CATARACT PHACO WITH IOL  4/21/2015    Performed by Dmitry Bejarano M.D. at [facility] ORS   • RECOVERY  11/30/2010    Performed by SURGERY, [facility]-RECOVERY at SURGERY SAME DAY [facility] ORS   • COLONOSCOPY  2008    Normal    [facility]   • ABDOMINAL HYSTERECTOMY TOTAL  April 15,1975    still has ovaries   • OPEN REDUCTION      left ankle   • OTHER      Removed pins from left ankle   • TONSILLECTOMY AND ADENOIDECTOMY       Family History   Problem Relation Age of Onset   • Stroke Mother    • Diabetes Father    • Stroke Sister    • Heart Disease Brother    • Stroke Sister    • GI Daughter      Crohn's Disease   • Heart Disease Daughter      CHF   • Other Daughter      Chronic Pain--Lymphedema   • Cancer Paternal Aunt      History   Smoking Status   • Never Smoker   Smokeless Tobacco   • Never Used     Allergies   Allergen Reactions   • Amiodarone Hives     Throat and tongue itching   • Atorvastatin Calcium-Polysorbate 80      Muscle aches     • Augmentin      Unknown reaction   • Bactrim Shortness of Breath   • Cipro Xr Swelling   • Claritin-D [Loratadine-Pseudoephedrine] Palpitations   • Clotrimazole      Stinging / burning on chest   • Diltiazem      rash   • Flecainide      dizziness   • Keflex Unspecified     Pt states \"Unsure\".   • Lipitor [Atorvastatin Calcium]      Muscle aches   • Metoprolol      Causes throat swelling   • Morphine      Hallucinations   • Mucinex      GI Distress     • Qvar [Beclomethasone Dipropionate]      Pressure on heart     • Tape Rash     Paper tape okay   • Tramadol      crying   • Vibramycin Shortness of Breath     Outpatient Encounter Prescriptions as of 10/31/2017   Medication Sig Dispense Refill   • verapamil " (ISOPTIN) 120 MG Tab Take 1 Tab by mouth See Admin Instructions. Take 240 mg every morning, take 180 mg at noon, take 180 mg at 8:00pm 450 Tab 1   • Multiple Vitamins-Minerals (ICAPS AREDS 2) Cap Take 2 Caps by mouth every day. 100 Cap 3   • potassium chloride SA (KDUR) 20 MEQ Tab CR Take 3 Tabs by mouth 3 times a day. 180 Tab 3   • estradiol (ESTRACE) 2 MG Tab TAKE ONE TABLET BY MOUTH EVERY DAY 90 Tab 1   • furosemide (LASIX) 40 MG Tab TAKE THREE TABLETS BY MOUTH DAILY 270 Tab 3   • ranitidine (ZANTAC) 150 MG Tab Take 1 Tab by mouth every day. 90 Tab 3   • lisinopril (PRINIVIL) 5 MG Tab Take 1 Tab by mouth every day. 90 Tab 2   • levothyroxine (SYNTHROID) 88 MCG Tab TAKE  ONE TABLET BY MOUTH EVERY MORNING BEFORE BREAKFAST 90 Tab 3   • magnesium oxide (MAG-OX) 400 MG Tab TAKE ONE TABLET BY MOUTH EVERY DAY 90 Tab 3   • warfarin (COUMADIN) 2.5 MG Tab Take 2.5 mg by mouth every day. Patient reports: Takes one 2.5 mg Sunday Monday Tuesday Wednesday Friday Saturday and on Thursday take 1 1/2 tab     • fexofenadine (ALLEGRA) 60 MG Tab Take 1 Tab by mouth as needed. For allergies  Indications: Hayfever 180 Tab 3   • digoxin (LANOXIN) 125 MCG Tab TAKE ONE TABLET BY MOUTH EVERY DAY 90 Tab 3   • sertraline (ZOLOFT) 25 MG tablet TAKE ONE TABLET BY MOUTH EVERY DAY 90 Tab 1   • albuterol 108 (90 BASE) MCG/ACT Aero Soln inhalation aerosol Inhale 2 Puffs by mouth every 6 hours as needed for Shortness of Breath.     • Diclofenac Sodium 1 % Gel Apply 2 g to affected area(s) as needed (For joint pain).     • albuterol (PROVENTIL) 2.5mg/3ml Nebu Soln solution for nebulization 2.5 mg by Nebulization route every four hours as needed for Shortness of Breath.     • acetaminophen (TYLENOL) 500 MG TABS Take 1,000 mg by mouth 3 times a day. Indications: Pain     • MAGOX 400 400 (241.3 MG) MG TABS tablet Take 400 mg by mouth every day.  3   • sennosides-docusate sodium (SENOKOT-S) 8.6-50 MG tablet Take 3 Tabs by mouth every day. 30 Tab    •  "vitamin D (CHOLECALCIFEROL) 1000 UNIT TABS Take 2,000 Units by mouth every day.     • calcium carbonate (OS-BRAYDON 500) 500 MG Tab Take 1 Tab by mouth 2 times a day, with meals. 90 Tab 3   • docusate sodium (COLACE) 100 MG Cap Take 200 mg by mouth every evening.       No facility-administered encounter medications on file as of 10/31/2017.      Review of Systems   Constitutional: Positive for malaise/fatigue. Negative for chills and fever.   HENT: Negative for congestion.    Eyes: Negative for blurred vision, pain and discharge.   Respiratory: Negative for cough, hemoptysis and wheezing.    Cardiovascular: Positive for palpitations. Negative for chest pain.   Gastrointestinal: Negative for abdominal pain, nausea and vomiting.   Musculoskeletal: Negative for joint pain and myalgias.   Skin: Negative for itching and rash.   Neurological: Positive for weakness. Negative for speech change and loss of consciousness.   Endo/Heme/Allergies: Does not bruise/bleed easily.   Psychiatric/Behavioral: Negative for depression. The patient is not nervous/anxious.    All other systems reviewed and are negative.       Objective:   /82   Pulse (!) 139   Ht 1.626 m (5' 4.02\")   Wt 80.3 kg (177 lb)   LMP 01/01/1993   SpO2 95%   BMI 30.37 kg/m²      Physical Exam   Constitutional: She is oriented to person, place, and time. She appears well-developed. No distress.   HENT:   Mouth/Throat: Mucous membranes are normal.   Eyes: Conjunctivae and EOM are normal.   Neck: No JVD present. No tracheal deviation present. No thyroid mass and no thyromegaly present.   Cardiovascular: Intact distal pulses.  An irregularly irregular rhythm present. Tachycardia present.    No murmur heard.  Pulmonary/Chest: Effort normal and breath sounds normal. No respiratory distress. She exhibits no tenderness.   Abdominal: Soft. There is no tenderness.   Musculoskeletal: Normal range of motion. She exhibits no edema.   Neurological: She is alert and " oriented to person, place, and time. She has normal strength. She displays no tremor.   Skin: Skin is warm and dry. She is not diaphoretic.   Psychiatric: She has a normal mood and affect. Her behavior is normal.   Vitals reviewed.      Assessment:     1. PVC (premature ventricular contraction)  EKG   2. PAF (paroxysmal atrial fibrillation) (CMS-[facility])  EKG   3. Coronary artery disease involving native coronary artery of native heart without angina pectoris     4. Persistent atrial fibrillation (CMS-[facility])     5. Chronic anticoagulation     6. Obesity (BMI 30-39.9)     7. Essential hypertension         Medical Decision Making:  Today's Assessment / Status / Plan:   1. Persistent atrial fib and previous flutter. Discussed rate control. Primary RFA. BIV PPM and AV izzy RFA. She wishes to try primary RFA.  May require Tikosyn post procedure to get reasonable efficacy.  The risks, benefits,and alternatives to atrial fibrillation ablation with general anesthesia were discussed in great detail. Specific risks mentioned including bleeding, infection, arteriovenous fistula/pseudoaneurysm related to sheath placement, cardiac perforation with possible tamponade requiring pericardiocentesis or possible open heart surgery were discussed. In addition,we discussed atrial fibrillation ablation specific complications including pulmonary vein stenosis, left atrial perforation (2-4%), and nerve damage involving the recurrent laryngeal nerve, the vagus nerve with resulting gastroparesis, and the phrenic nerve.  Also mentioned was a 1/400 (0.25%) occurrence of atrial esophageal fistula, which is an abnormal communication between the esophagus and the left atrium; this complication is often fatal. Lastly the risks of death, myocardial infarction, stroke, DVT and PE were discussed. The patient verbalized understanding of these potential complications and wishes to proceed with this procedure.  The risks, benefits, and alternatives to  transesophageal echocardiogram with IV sedation were discussed with the patient in specific detail, including oropharyngeal and esophageal traumas including hoarseness and dysphagia after the procedure. Rare cases demonstrating serious or fatal complications associated with transesophageal echocardiogram have been reported in the adult population, including cardiac, pulmonary and bleeding complications in less than 1% of people. Patients with an identified intracardiac thrombus are at increased risk for embolic events and this appears to be reduced with anticoagulant therapy. The patient verbalized understandings about these  possible complications and wishes to proceed with this procedure  2. On coumadin.      Kishore Price M.D.  [address]  [place] [address]  VIA In Basket     Lizzy Haywood M.D.  [address]  [address]  [place] [address]  VIA In Basket

## 2017-10-31 NOTE — PROGRESS NOTES
Anticoagulation Summary  As of 10/31/2017    INR goal:   2.0-3.0   TTR:   70.7 % (2.4 y)   Today's INR:   1.7!   Maintenance plan:   3.75 mg (2.5 mg x 1.5) on Wed; 2.5 mg (2.5 mg x 1) all other days   Weekly total:   18.75 mg   Plan last modified:   Vladimir Taylor, PharmGABRIEL (10/18/2017)   Next INR check:   11/7/2017   Target end date:   Indefinite    Indications    Atrial fibrillation (CMS-HCC) (Resolved) [I48.91]  Chronic anticoagulation [Z79.01]             Anticoagulation Episode Summary     INR check location:   Home Draw    Preferred lab:       Send INR reminders to:       Comments:   Winston YEAGER      Anticoagulation Care Providers     Provider Role Specialty Phone number    Lizzy Haywood M.D. Referring Cardiology [phone]    [facility] Responsible  [phone]    Vladimir Taylor, PharmD Responsible          Anticoagulation Patient Findings    Spoke to patient on phone. No current signs of bleeding. No interval medication changes other than some recent bursa injections. Since this time she has had felt a tachyarrhythmia. She sees Dr. Matthews today to address her symptoms. Increase dose of warfarin to 3.75 mg today. Follow up INR in 1 week.    Wayne Cheng, PharmD

## 2017-10-31 NOTE — PROGRESS NOTES
"Subjective:   Donte Magaña is a 76 y.o. female who presents today being seen in consult on request of Dr TERI Haywood secondary to recurrent PAF and now persistent a fib with difficulty with rate control and fatigue. Mild FERRO. No chest pain. No syncope. Multiple AA meds tried in past with multiple intolerance. On coumadin. Moderate CAD. Recent echo in June ok. TFT's ok. Nl thallium in 2016.    Past Medical History:   Diagnosis Date   • A-fib (CMS-[facility])    • Anesthesia     \"Tachycardia for 5 days after cataract surgery\"   • Anticoagulant long-term use 1/12/2012   • Arthritis     Knees, hips   • Asthma     with pneumonia, nothing for 3 years   • Atrial fibrillation (CMS-[facility])    • Backpain    • Breath shortness     with exertion O2  2l/m PRN, hasnt used for 3 years   • Bronchitis Nov, 2013   • CAD (coronary artery disease)    • Depression    • Glaucoma 5/3/2011   • Hematoma complicating a procedure 11/3/2012   • High cholesterol    • Hypertension    • Hypothyroid    • Indigestion    • Lupus    • Macular degeneration    • Menopause 1/12/2012   • Mitral regurgitation 10/30/2012   • Obesity 1/12/2012   • Pneumonia feb,2013   • Pulmonary hypertension 10/30/2012   • PVC's (premature ventricular contractions) 1/12/2012   • Senile nuclear sclerosis    • Spinal stenosis of lumbar region at multiple levels    • Unspecified cataract     repaired bilateral   • Unspecified disorder of thyroid     hypothyroidism   • Unspecified urinary incontinence      Past Surgical History:   Procedure Laterality Date   • KNEE ARTHROPLASTY TOTAL Right 6/23/2016    Procedure: KNEE ARTHROPLASTY TOTAL;  Surgeon: Heriberto Lozada M.D.;  Location: SURGERY [facility];  Service:    • KNEE ARTHROPLASTY TOTAL Left 5/28/2015    Procedure: KNEE ARTHROPLASTY TOTAL;  Surgeon: Heriberto Lozada M.D.;  Location: SURGERY [facility];  Service:    • CATARACT PHACO WITH IOL Right 5/5/2015    Procedure: IOL OD - STANDARD;  Surgeon: Dmitry Bejarano M.D.;  " "Location: SURGERY [facility] ORS;  Service:    • CATARACT PHACO WITH IOL  4/21/2015    Performed by Dmitry Bejarano M.D. at [facility] ORS   • RECOVERY  11/30/2010    Performed by SURGERY, [facility] ORS   • COLONOSCOPY  2008    Normal    [facility]   • ABDOMINAL HYSTERECTOMY TOTAL  April 15,1975    still has ovaries   • OPEN REDUCTION      left ankle   • OTHER      Removed pins from left ankle   • TONSILLECTOMY AND ADENOIDECTOMY       Family History   Problem Relation Age of Onset   • Stroke Mother    • Diabetes Father    • Stroke Sister    • Heart Disease Brother    • Stroke Sister    • GI Daughter      Crohn's Disease   • Heart Disease Daughter      CHF   • Other Daughter      Chronic Pain--Lymphedema   • Cancer Paternal Aunt      History   Smoking Status   • Never Smoker   Smokeless Tobacco   • Never Used     Allergies   Allergen Reactions   • Amiodarone Hives     Throat and tongue itching   • Atorvastatin Calcium-Polysorbate 80      Muscle aches     • Augmentin      Unknown reaction   • Bactrim Shortness of Breath   • Cipro Xr Swelling   • Claritin-D [Loratadine-Pseudoephedrine] Palpitations   • Clotrimazole      Stinging / burning on chest   • Diltiazem      rash   • Flecainide      dizziness   • Keflex Unspecified     Pt states \"Unsure\".   • Lipitor [Atorvastatin Calcium]      Muscle aches   • Metoprolol      Causes throat swelling   • Morphine      Hallucinations   • Mucinex      GI Distress     • Qvar [Beclomethasone Dipropionate]      Pressure on heart     • Tape Rash     Paper tape okay   • Tramadol      crying   • Vibramycin Shortness of Breath     Outpatient Encounter Prescriptions as of 10/31/2017   Medication Sig Dispense Refill   • verapamil (ISOPTIN) 120 MG Tab Take 1 Tab by mouth See Admin Instructions. Take 240 mg every morning, take 180 mg at noon, take 180 mg at 8:00pm 450 Tab 1   • Multiple Vitamins-Minerals (ICAPS AREDS 2) Cap Take 2 Caps by " mouth every day. 100 Cap 3   • potassium chloride SA (KDUR) 20 MEQ Tab CR Take 3 Tabs by mouth 3 times a day. 180 Tab 3   • estradiol (ESTRACE) 2 MG Tab TAKE ONE TABLET BY MOUTH EVERY DAY 90 Tab 1   • furosemide (LASIX) 40 MG Tab TAKE THREE TABLETS BY MOUTH DAILY 270 Tab 3   • ranitidine (ZANTAC) 150 MG Tab Take 1 Tab by mouth every day. 90 Tab 3   • lisinopril (PRINIVIL) 5 MG Tab Take 1 Tab by mouth every day. 90 Tab 2   • levothyroxine (SYNTHROID) 88 MCG Tab TAKE  ONE TABLET BY MOUTH EVERY MORNING BEFORE BREAKFAST 90 Tab 3   • magnesium oxide (MAG-OX) 400 MG Tab TAKE ONE TABLET BY MOUTH EVERY DAY 90 Tab 3   • warfarin (COUMADIN) 2.5 MG Tab Take 2.5 mg by mouth every day. Patient reports: Takes one 2.5 mg Sunday Monday Tuesday Wednesday Friday Saturday and on Thursday take 1 1/2 tab     • fexofenadine (ALLEGRA) 60 MG Tab Take 1 Tab by mouth as needed. For allergies  Indications: Hayfever 180 Tab 3   • digoxin (LANOXIN) 125 MCG Tab TAKE ONE TABLET BY MOUTH EVERY DAY 90 Tab 3   • sertraline (ZOLOFT) 25 MG tablet TAKE ONE TABLET BY MOUTH EVERY DAY 90 Tab 1   • albuterol 108 (90 BASE) MCG/ACT Aero Soln inhalation aerosol Inhale 2 Puffs by mouth every 6 hours as needed for Shortness of Breath.     • Diclofenac Sodium 1 % Gel Apply 2 g to affected area(s) as needed (For joint pain).     • albuterol (PROVENTIL) 2.5mg/3ml Nebu Soln solution for nebulization 2.5 mg by Nebulization route every four hours as needed for Shortness of Breath.     • acetaminophen (TYLENOL) 500 MG TABS Take 1,000 mg by mouth 3 times a day. Indications: Pain     • MAGOX 400 400 (241.3 MG) MG TABS tablet Take 400 mg by mouth every day.  3   • sennosides-docusate sodium (SENOKOT-S) 8.6-50 MG tablet Take 3 Tabs by mouth every day. 30 Tab    • vitamin D (CHOLECALCIFEROL) 1000 UNIT TABS Take 2,000 Units by mouth every day.     • calcium carbonate (OS-BRAYDON 500) 500 MG Tab Take 1 Tab by mouth 2 times a day, with meals. 90 Tab 3   • docusate sodium (COLACE)  "100 MG Cap Take 200 mg by mouth every evening.       No facility-administered encounter medications on file as of 10/31/2017.      Review of Systems   Constitutional: Positive for malaise/fatigue. Negative for chills and fever.   HENT: Negative for congestion.    Eyes: Negative for blurred vision, pain and discharge.   Respiratory: Negative for cough, hemoptysis and wheezing.    Cardiovascular: Positive for palpitations. Negative for chest pain.   Gastrointestinal: Negative for abdominal pain, nausea and vomiting.   Musculoskeletal: Negative for joint pain and myalgias.   Skin: Negative for itching and rash.   Neurological: Positive for weakness. Negative for speech change and loss of consciousness.   Endo/Heme/Allergies: Does not bruise/bleed easily.   Psychiatric/Behavioral: Negative for depression. The patient is not nervous/anxious.    All other systems reviewed and are negative.       Objective:   /82   Pulse (!) 139   Ht 1.626 m (5' 4.02\")   Wt 80.3 kg (177 lb)   LMP 01/01/1993   SpO2 95%   BMI 30.37 kg/m²     Physical Exam   Constitutional: She is oriented to person, place, and time. She appears well-developed. No distress.   HENT:   Mouth/Throat: Mucous membranes are normal.   Eyes: Conjunctivae and EOM are normal.   Neck: No JVD present. No tracheal deviation present. No thyroid mass and no thyromegaly present.   Cardiovascular: Intact distal pulses.  An irregularly irregular rhythm present. Tachycardia present.    No murmur heard.  Pulmonary/Chest: Effort normal and breath sounds normal. No respiratory distress. She exhibits no tenderness.   Abdominal: Soft. There is no tenderness.   Musculoskeletal: Normal range of motion. She exhibits no edema.   Neurological: She is alert and oriented to person, place, and time. She has normal strength. She displays no tremor.   Skin: Skin is warm and dry. She is not diaphoretic.   Psychiatric: She has a normal mood and affect. Her behavior is normal. "   Vitals reviewed.      Assessment:     1. PVC (premature ventricular contraction)  EKG   2. PAF (paroxysmal atrial fibrillation) (CMS-HCC)  EKG   3. Coronary artery disease involving native coronary artery of native heart without angina pectoris     4. Persistent atrial fibrillation (CMS-HCC)     5. Chronic anticoagulation     6. Obesity (BMI 30-39.9)     7. Essential hypertension         Medical Decision Making:  Today's Assessment / Status / Plan:   1. Persistent atrial fib and previous flutter. Discussed rate control. Primary RFA. BIV PPM and AV izzy RFA. She wishes to try primary RFA.  May require Tikosyn post procedure to get reasonable efficacy.  The risks, benefits,and alternatives to atrial fibrillation ablation with general anesthesia were discussed in great detail. Specific risks mentioned including bleeding, infection, arteriovenous fistula/pseudoaneurysm related to sheath placement, cardiac perforation with possible tamponade requiring pericardiocentesis or possible open heart surgery were discussed. In addition,we discussed atrial fibrillation ablation specific complications including pulmonary vein stenosis, left atrial perforation (2-4%), and nerve damage involving the recurrent laryngeal nerve, the vagus nerve with resulting gastroparesis, and the phrenic nerve.  Also mentioned was a 1/400 (0.25%) occurrence of atrial esophageal fistula, which is an abnormal communication between the esophagus and the left atrium; this complication is often fatal. Lastly the risks of death, myocardial infarction, stroke, DVT and PE were discussed. The patient verbalized understanding of these potential complications and wishes to proceed with this procedure.  The risks, benefits, and alternatives to transesophageal echocardiogram with IV sedation were discussed with the patient in specific detail, including oropharyngeal and esophageal traumas including hoarseness and dysphagia after the procedure. Rare cases  demonstrating serious or fatal complications associated with transesophageal echocardiogram have been reported in the adult population, including cardiac, pulmonary and bleeding complications in less than 1% of people. Patients with an identified intracardiac thrombus are at increased risk for embolic events and this appears to be reduced with anticoagulant therapy. The patient verbalized understandings about these  possible complications and wishes to proceed with this procedure  2. On coumadin.

## 2017-11-06 ENCOUNTER — TELEPHONE (OUTPATIENT)
Dept: CARDIOLOGY | Facility: MEDICAL CENTER | Age: 77
End: 2017-11-06

## 2017-11-06 ENCOUNTER — PATIENT MESSAGE (OUTPATIENT)
Dept: CARDIOLOGY | Facility: MEDICAL CENTER | Age: 77
End: 2017-11-06

## 2017-11-06 NOTE — TELEPHONE ENCOUNTER
Patient scheduled for an afib/flutter ablation w/ENA on 12-13-17 at [facility] with Dr. Matthews.

## 2017-11-07 ENCOUNTER — ANTICOAGULATION MONITORING (OUTPATIENT)
Dept: VASCULAR LAB | Facility: MEDICAL CENTER | Age: 77
End: 2017-11-07

## 2017-11-07 DIAGNOSIS — Z79.01 CHRONIC ANTICOAGULATION: ICD-10-CM

## 2017-11-07 LAB — INR PPP: 2 (ref 2–3.5)

## 2017-11-07 NOTE — PROGRESS NOTES
.  Anticoagulation Summary  As of 11/7/2017    INR goal:   2.0-3.0   TTR:   70.2 % (2.4 y)   Today's INR:   2.0   Maintenance plan:   3.75 mg (2.5 mg x 1.5) on Wed; 2.5 mg (2.5 mg x 1) all other days   Weekly total:   18.75 mg   Plan last modified:   Vladimir Taylor PharmD (10/18/2017)   Next INR check:      Target end date:   Indefinite    Indications    Atrial fibrillation (CMS-HCC) (Resolved) [I48.91]  Chronic anticoagulation [Z79.01]             Anticoagulation Episode Summary     INR check location:   Home Draw    Preferred lab:       Send INR reminders to:       Comments:   Winston YEAGER      Anticoagulation Care Providers     Provider Role Specialty Phone number    Lizzy Haywood M.D. Referring Cardiology [phone]    [facility] Responsible  [phone]    Vladimir Taylor, Tim Responsible          Anticoagulation Patient Findings    See note by Keya Martin 02/23/2016    .Massiel Coleman, PeetrD

## 2017-11-09 ENCOUNTER — TELEPHONE (OUTPATIENT)
Dept: CARDIOLOGY | Facility: MEDICAL CENTER | Age: 77
End: 2017-11-09

## 2017-11-09 ENCOUNTER — OFFICE VISIT (OUTPATIENT)
Dept: CARDIOLOGY | Facility: MEDICAL CENTER | Age: 77
End: 2017-11-09
Payer: MEDICARE

## 2017-11-09 VITALS
OXYGEN SATURATION: 96 % | DIASTOLIC BLOOD PRESSURE: 82 MMHG | SYSTOLIC BLOOD PRESSURE: 122 MMHG | BODY MASS INDEX: 30.25 KG/M2 | HEART RATE: 144 BPM | WEIGHT: 177.2 LBS | HEIGHT: 64 IN

## 2017-11-09 DIAGNOSIS — I51.89 DIASTOLIC DYSFUNCTION: ICD-10-CM

## 2017-11-09 DIAGNOSIS — M48.00 SPINAL STENOSIS, MULTILEVEL: ICD-10-CM

## 2017-11-09 DIAGNOSIS — I25.10 CORONARY ARTERY DISEASE INVOLVING NATIVE CORONARY ARTERY OF NATIVE HEART WITHOUT ANGINA PECTORIS: ICD-10-CM

## 2017-11-09 DIAGNOSIS — I48.19 PERSISTENT ATRIAL FIBRILLATION (HCC): ICD-10-CM

## 2017-11-09 DIAGNOSIS — R60.0 LOCALIZED EDEMA: ICD-10-CM

## 2017-11-09 DIAGNOSIS — M79.672 PAIN IN BOTH FEET: ICD-10-CM

## 2017-11-09 DIAGNOSIS — Z79.01 CHRONIC ANTICOAGULATION: ICD-10-CM

## 2017-11-09 DIAGNOSIS — M79.671 PAIN IN BOTH FEET: ICD-10-CM

## 2017-11-09 PROCEDURE — 99214 OFFICE O/P EST MOD 30 MIN: CPT | Performed by: INTERNAL MEDICINE

## 2017-11-09 ASSESSMENT — ENCOUNTER SYMPTOMS
BRUISES/BLEEDS EASILY: 0
MYALGIAS: 0
HEARTBURN: 0
DIZZINESS: 0
NERVOUS/ANXIOUS: 0
INSOMNIA: 0
EYES NEGATIVE: 1
PALPITATIONS: 1
SHORTNESS OF BREATH: 0
NAUSEA: 0
WEIGHT LOSS: 0
COUGH: 0

## 2017-11-09 NOTE — PROGRESS NOTES
"Subjective:   Donte Magaña is a 76 y.o. female who presents today in follow-up in regards to her valvular heart disease, coronary disease and atrial fibrillation on anticoagulation and rate control    Edema is concerning her  No change in meds - liked seeing Dr Matthews - has some questions about AVN ablation & even PPM  In with       Past Medical History:   Diagnosis Date   • A-fib (CMS-HCC)    • Anesthesia     \"Tachycardia for 5 days after cataract surgery\"   • Anticoagulant long-term use 1/12/2012   • Arthritis     Knees, hips   • Asthma     with pneumonia, nothing for 3 years   • Atrial fibrillation (CMS-HCC)    • Backpain    • Breath shortness     with exertion O2  2l/m PRN, hasnt used for 3 years   • Bronchitis Nov, 2013   • CAD (coronary artery disease)    • Depression    • Glaucoma 5/3/2011   • Hematoma complicating a procedure 11/3/2012   • High cholesterol    • Hypertension    • Hypothyroid    • Indigestion    • Lupus    • Macular degeneration    • Menopause 1/12/2012   • Mitral regurgitation 10/30/2012   • Obesity 1/12/2012   • Pneumonia feb,2013   • Pulmonary hypertension 10/30/2012   • PVC's (premature ventricular contractions) 1/12/2012   • Senile nuclear sclerosis    • Spinal stenosis of lumbar region at multiple levels    • Unspecified cataract     repaired bilateral   • Unspecified disorder of thyroid     hypothyroidism   • Unspecified urinary incontinence      Past Surgical History:   Procedure Laterality Date   • KNEE ARTHROPLASTY TOTAL Right 6/23/2016    Procedure: KNEE ARTHROPLASTY TOTAL;  Surgeon: Heriberto Lozada M.D.;  Location: [facility];  Service:    • KNEE ARTHROPLASTY TOTAL Left 5/28/2015    Procedure: KNEE ARTHROPLASTY TOTAL;  Surgeon: Heriberto Lozada M.D.;  Location: [facility];  Service:    • CATARACT PHACO WITH IOL Right 5/5/2015    Procedure: IOL OD - STANDARD;  Surgeon: Dmitry Bejarano M.D.;  Location: [facility];  Service:    • " "CATARACT PHACO WITH IOL  4/21/2015    Performed by Dmitry Bejarano M.D. at [facility]   • RECOVERY  11/30/2010    Performed by SURGERY, CATH-RECOVERY at [facility]   • COLONOSCOPY  2008    Normal    [facility]   • ABDOMINAL HYSTERECTOMY TOTAL  April 15,1975    still has ovaries   • OPEN REDUCTION      left ankle   • OTHER      Removed pins from left ankle   • TONSILLECTOMY AND ADENOIDECTOMY       Family History   Problem Relation Age of Onset   • Stroke Mother    • Diabetes Father    • Stroke Sister    • Heart Disease Brother    • Stroke Sister    • GI Daughter      Crohn's Disease   • Heart Disease Daughter      CHF   • Other Daughter      Chronic Pain--Lymphedema   • Cancer Paternal Aunt      History   Smoking Status   • Never Smoker   Smokeless Tobacco   • Never Used     Allergies   Allergen Reactions   • Amiodarone Hives     Throat and tongue itching   • Atorvastatin Calcium-Polysorbate 80      Muscle aches     • Augmentin      Unknown reaction   • Bactrim Shortness of Breath   • Cipro Xr Swelling   • Claritin-D [Loratadine-Pseudoephedrine] Palpitations   • Clotrimazole      Stinging / burning on chest   • Diltiazem      rash   • Flecainide      dizziness   • Keflex Unspecified     Pt states \"Unsure\".   • Lipitor [Atorvastatin Calcium]      Muscle aches   • Metoprolol      Causes throat swelling   • Morphine      Hallucinations   • Mucinex      GI Distress     • Qvar [Beclomethasone Dipropionate]      Pressure on heart     • Tape Rash     Paper tape okay   • Tramadol      crying   • Vibramycin Shortness of Breath     Outpatient Encounter Prescriptions as of 11/9/2017   Medication Sig Dispense Refill   • verapamil (ISOPTIN) 120 MG Tab Take 1 Tab by mouth See Admin Instructions. Take 240 mg every morning, take 180 mg at noon, take 180 mg at 8:00pm 450 Tab 1   • Multiple Vitamins-Minerals (ICAPS AREDS 2) Cap Take 2 Caps by mouth every day. 100 Cap 3   • potassium chloride SA " (KDUR) 20 MEQ Tab CR Take 3 Tabs by mouth 3 times a day. 180 Tab 3   • estradiol (ESTRACE) 2 MG Tab TAKE ONE TABLET BY MOUTH EVERY DAY 90 Tab 1   • furosemide (LASIX) 40 MG Tab TAKE THREE TABLETS BY MOUTH DAILY 270 Tab 3   • ranitidine (ZANTAC) 150 MG Tab Take 1 Tab by mouth every day. 90 Tab 3   • lisinopril (PRINIVIL) 5 MG Tab Take 1 Tab by mouth every day. 90 Tab 2   • levothyroxine (SYNTHROID) 88 MCG Tab TAKE  ONE TABLET BY MOUTH EVERY MORNING BEFORE BREAKFAST 90 Tab 3   • magnesium oxide (MAG-OX) 400 MG Tab TAKE ONE TABLET BY MOUTH EVERY DAY 90 Tab 3   • warfarin (COUMADIN) 2.5 MG Tab Take 2.5 mg by mouth every day. Patient reports: Takes one 2.5 mg Sunday Monday Tuesday Wednesday Friday Saturday and on Thursday take 1 1/2 tab     • fexofenadine (ALLEGRA) 60 MG Tab Take 1 Tab by mouth as needed. For allergies  Indications: Hayfever 180 Tab 3   • digoxin (LANOXIN) 125 MCG Tab TAKE ONE TABLET BY MOUTH EVERY DAY 90 Tab 3   • sertraline (ZOLOFT) 25 MG tablet TAKE ONE TABLET BY MOUTH EVERY DAY 90 Tab 1   • Diclofenac Sodium 1 % Gel Apply 2 g to affected area(s) as needed (For joint pain).     • albuterol (PROVENTIL) 2.5mg/3ml Nebu Soln solution for nebulization 2.5 mg by Nebulization route every four hours as needed for Shortness of Breath.     • acetaminophen (TYLENOL) 500 MG TABS Take 1,000 mg by mouth 3 times a day. Indications: Pain     • sennosides-docusate sodium (SENOKOT-S) 8.6-50 MG tablet Take 3 Tabs by mouth every day. 30 Tab    • vitamin D (CHOLECALCIFEROL) 1000 UNIT TABS Take 2,000 Units by mouth every day.     • calcium carbonate (OS-BRAYDON 500) 500 MG Tab Take 1 Tab by mouth 2 times a day, with meals. 90 Tab 3   • albuterol 108 (90 BASE) MCG/ACT Aero Soln inhalation aerosol Inhale 2 Puffs by mouth every 6 hours as needed for Shortness of Breath.     • docusate sodium (COLACE) 100 MG Cap Take 200 mg by mouth every evening.     • MAGOX 400 400 (241.3 MG) MG TABS tablet Take 400 mg by mouth every day.  3  "    No facility-administered encounter medications on file as of 11/9/2017.      Review of Systems   Constitutional: Negative for malaise/fatigue and weight loss.   Eyes: Negative.    Respiratory: Negative for cough and shortness of breath.    Cardiovascular: Positive for palpitations and leg swelling (bothers her - she worries, also + tingling and needles ). Negative for chest pain.   Gastrointestinal: Negative for heartburn and nausea.   Musculoskeletal: Positive for joint pain. Negative for myalgias.        No changes   Neurological: Negative for dizziness.   Endo/Heme/Allergies: Does not bruise/bleed easily.   Psychiatric/Behavioral: The patient is not nervous/anxious and does not have insomnia.    All other systems reviewed and are negative.       Objective:   /82   Pulse (!) 144   Ht 1.626 m (5' 4\")   Wt 80.4 kg (177 lb 3.2 oz)   LMP 01/01/1993   SpO2 96%   BMI 30.42 kg/m²     Physical Exam   Constitutional: She is oriented to person, place, and time. She appears well-developed.   HENT:   Head: Normocephalic and atraumatic.   Mouth/Throat: Mucous membranes are normal.   Eyes: EOM are normal. Pupils are equal, round, and reactive to light. No scleral icterus.   Neck: No JVD present. No thyroid mass and no thyromegaly present.   Cardiovascular: Normal rate, regular rhythm and intact distal pulses.    Murmur heard.  2/6 systolic blowing murmur;  heart rate 85 on my exam   Pulmonary/Chest: Effort normal and breath sounds normal. She has no wheezes.   Abdominal: Bowel sounds are normal.   Musculoskeletal: Normal range of motion. She exhibits no edema (normal capillary refill, normal pulses, mild nonpitting edema bilaterally) or tenderness.   Neurological: She is alert and oriented to person, place, and time. She has normal strength. She displays no tremor. She exhibits normal muscle tone. Coordination normal.   Skin: Skin is warm and dry. No rash noted.   Psychiatric: She has a normal mood and affect. " Her behavior is normal.   Vitals reviewed.      Assessment:     1. Spinal stenosis, multilevel     2. Coronary artery disease involving native coronary artery of native heart without angina pectoris     3. Localized edema     4. Diastolic dysfunction     5. Persistent atrial fibrillation (CMS-HCC)     6. Chronic anticoagulation         Medical Decision Making:  Today's Assessment / Status / Plan:     Atrial fibrillation   Persistent   Tolerating systemic anticoagulation   mild mitral regurgitation as above.   Lab work from August including kidney function is normal, last CBC was in July without anemia  Echo done in the spring shows normal heart function with only modest diastolic dysfunction and no valve disease     I agree completely she has tried multiple medications she is on very high-dose AV izzy blockers and her symptoms are still quite bothersome to her   I tried my best to answer questions about AV izzy ablation versus atrial fibrillation ablation. She feels very confident in Dr. Matthews and I told her I would support their plan fully    Coronary disease, moderate on angiogram 2013  Reassuring and normal nuclear perfusion imaging study 2016  Statin and aspirin    Polypharmacy and multiple drug allergies  She has had complications in the past with procedures due to hematoma and a drug allergy  Reassurance given    Edema  Stasis, reassurance  I did refer her to podiatry as it does sound like she has some symptoms of neuropathy    Hypertension, good control for now  RTC 6 months, sooner with concerns

## 2017-11-10 NOTE — TELEPHONE ENCOUNTER
"Shellie Urrutia - Referral to Podiatry << Less Detail',event)\" href=\"javascript:;\">More Detail >>      Referral to Podiatry   Shellie Urrutia   Sent: Thu November 09, 2017  3:15 PM   To: Karen Santos R.N.                  Message     The referral has been faxed to:   Dr. Bergman   [phone]     =============================================================================    Noted.   "

## 2017-11-14 ENCOUNTER — ANTICOAGULATION MONITORING (OUTPATIENT)
Dept: VASCULAR LAB | Facility: MEDICAL CENTER | Age: 77
End: 2017-11-14

## 2017-11-14 DIAGNOSIS — Z79.01 CHRONIC ANTICOAGULATION: ICD-10-CM

## 2017-11-20 RX ORDER — WARFARIN SODIUM 2.5 MG/1
TABLET ORAL
Qty: 135 TAB | Refills: 1 | Status: ON HOLD | OUTPATIENT
Start: 2017-11-20 | End: 2017-12-13

## 2017-11-21 ENCOUNTER — ANTICOAGULATION MONITORING (OUTPATIENT)
Dept: VASCULAR LAB | Facility: MEDICAL CENTER | Age: 77
End: 2017-11-21

## 2017-11-21 DIAGNOSIS — Z79.01 CHRONIC ANTICOAGULATION: ICD-10-CM

## 2017-11-21 LAB — INR PPP: 1.9 (ref 2–3.5)

## 2017-11-21 NOTE — PROGRESS NOTES
Anticoagulation Summary  As of 11/21/2017    INR goal:   2.0-3.0   TTR:   69.1 % (2.5 y)   Today's INR:   1.9!   Maintenance plan:   3.75 mg (2.5 mg x 1.5) on Mon, Thu; 2.5 mg (2.5 mg x 1) all other days   Weekly total:   20 mg   Plan last modified:   Wayne Cheng, PharmD (11/21/2017)   Next INR check:   11/28/2017   Target end date:   Indefinite    Indications    Atrial fibrillation (CMS-HCC) (Resolved) [I48.91]  Chronic anticoagulation [Z79.01]             Anticoagulation Episode Summary     INR check location:   Home Draw    Preferred lab:       Send INR reminders to:       Comments:   Winston YEAGER      Anticoagulation Care Providers     Provider Role Specialty Phone number    Lizzy Haywood M.D. Referring Cardiology [phone]    [facility] Responsible  [phone]    Peter HollowayD Responsible          Spoke to patient on phone. No current signs of bleeding. No interval medication changes. Increase dose of warfarin. Follow up INR in 1 weeks. Pending ablation procedure in mid-December.    Wayne Cheng, PeterD

## 2017-11-28 ENCOUNTER — ANTICOAGULATION MONITORING (OUTPATIENT)
Dept: VASCULAR LAB | Facility: MEDICAL CENTER | Age: 77
End: 2017-11-28

## 2017-11-28 ENCOUNTER — TELEPHONE (OUTPATIENT)
Dept: VASCULAR LAB | Facility: MEDICAL CENTER | Age: 77
End: 2017-11-28

## 2017-11-28 DIAGNOSIS — Z79.01 CHRONIC ANTICOAGULATION: ICD-10-CM

## 2017-11-28 LAB
INR PPP: 1.8 (ref 2–3.5)
INR PPP: 1.8 (ref 2–3.5)

## 2017-11-28 NOTE — PROGRESS NOTES
Anticoagulation Summary  As of 11/28/2017    INR goal:   2.0-3.0   TTR:   68.5 % (2.5 y)   Today's INR:   1.8!   Maintenance plan:   3.75 mg (2.5 mg x 1.5) on Mon, Thu; 2.5 mg (2.5 mg x 1) all other days   Weekly total:   20 mg   Plan last modified:   Wayne Cheng, PharmD (11/21/2017)   Next INR check:   12/5/2017   Target end date:   Indefinite    Indications    Atrial fibrillation (CMS-HCC) (Resolved) [I48.91]  Chronic anticoagulation [Z79.01]             Anticoagulation Episode Summary     INR check location:   Home Draw    Preferred lab:       Send INR reminders to:       Comments:   Winston YEAGER      Anticoagulation Care Providers     Provider Role Specialty Phone number    Lizzy Haywood M.D. Referring Cardiology [phone]    [facility] Anticoagulation [facility] Responsible  [phone]    Vladimir Taylor, PharmD Responsible          Anticoagulation Patient Findings    Spoke to patient on phone. No current signs of bleeding. No interval medication or significant diet changes that would impact INR. Increase warfarin to 5 mg tonight then continue current dose of warfarin. Follow up INR in 1 week. Pending ablation.    Wayne Cheng, PharmD

## 2017-12-05 ENCOUNTER — ANTICOAGULATION MONITORING (OUTPATIENT)
Dept: VASCULAR LAB | Facility: MEDICAL CENTER | Age: 77
End: 2017-12-05

## 2017-12-05 ENCOUNTER — OFFICE VISIT (OUTPATIENT)
Dept: MEDICAL GROUP | Facility: PHYSICIAN GROUP | Age: 77
End: 2017-12-05
Payer: MEDICARE

## 2017-12-05 VITALS
TEMPERATURE: 97.2 F | SYSTOLIC BLOOD PRESSURE: 134 MMHG | DIASTOLIC BLOOD PRESSURE: 72 MMHG | OXYGEN SATURATION: 96 % | BODY MASS INDEX: 30.73 KG/M2 | WEIGHT: 180 LBS | HEIGHT: 64 IN | HEART RATE: 126 BPM

## 2017-12-05 DIAGNOSIS — I48.20 CHRONIC ATRIAL FIBRILLATION (HCC): ICD-10-CM

## 2017-12-05 DIAGNOSIS — M79.671 PAIN IN BOTH FEET: ICD-10-CM

## 2017-12-05 DIAGNOSIS — F32.0 MILD SINGLE CURRENT EPISODE OF MAJOR DEPRESSIVE DISORDER (HCC): ICD-10-CM

## 2017-12-05 DIAGNOSIS — I48.19 PERSISTENT ATRIAL FIBRILLATION (HCC): ICD-10-CM

## 2017-12-05 DIAGNOSIS — M79.672 PAIN IN BOTH FEET: ICD-10-CM

## 2017-12-05 DIAGNOSIS — E66.9 OBESITY (BMI 30-39.9): ICD-10-CM

## 2017-12-05 DIAGNOSIS — I25.10 CORONARY ARTERY DISEASE INVOLVING NATIVE CORONARY ARTERY OF NATIVE HEART WITHOUT ANGINA PECTORIS: ICD-10-CM

## 2017-12-05 DIAGNOSIS — Z79.01 CHRONIC ANTICOAGULATION: ICD-10-CM

## 2017-12-05 DIAGNOSIS — E03.9 ACQUIRED HYPOTHYROIDISM: ICD-10-CM

## 2017-12-05 DIAGNOSIS — I10 ESSENTIAL HYPERTENSION: ICD-10-CM

## 2017-12-05 LAB — INR PPP: 2.7 (ref 2–3.5)

## 2017-12-05 PROCEDURE — 99214 OFFICE O/P EST MOD 30 MIN: CPT | Performed by: FAMILY MEDICINE

## 2017-12-05 RX ORDER — SERTRALINE HYDROCHLORIDE 25 MG/1
25 TABLET, FILM COATED ORAL
Qty: 90 TAB | Refills: 3 | Status: SHIPPED | OUTPATIENT
Start: 2017-12-05 | End: 2018-10-30

## 2017-12-05 RX ORDER — DIGOXIN 125 MCG
125 TABLET ORAL
Qty: 90 TAB | Refills: 3 | Status: ON HOLD | OUTPATIENT
Start: 2017-12-05 | End: 2018-07-20

## 2017-12-05 ASSESSMENT — PATIENT HEALTH QUESTIONNAIRE - PHQ9: CLINICAL INTERPRETATION OF PHQ2 SCORE: 0

## 2017-12-05 ASSESSMENT — PAIN SCALES - GENERAL: PAINLEVEL: NO PAIN

## 2017-12-05 NOTE — PROGRESS NOTES
Chief Complaint   Patient presents with   • Annual Wellness Visit         HPI:  Donte is a 77 y.o. here for Medicare Annual Wellness Visit--Patient is going to the hospital to have an attempt to convert her back to sinus rhythm. If that doesn't work and they're going to try pacemaker. She has no chest pains and no shortness of breath.         Patient Active Problem List    Diagnosis Date Noted   • Localized edema 08/14/2017     Priority: Medium   • Diastolic dysfunction 08/14/2017     Priority: Medium   • Obesity (BMI 30-39.9) 03/08/2017     Priority: Medium   • CAD (coronary artery disease) 10/07/2014     Priority: Medium   • HTN (hypertension) 02/16/2013     Priority: Medium   • Mixed hyperlipidemia 10/23/2009     Priority: Medium   • Degenerative arthritis of knee, bilateral 06/23/2016     Priority: Low   • Depression 07/30/2015     Priority: Low   • Osteoarthrosis involving lower leg 05/28/2015     Priority: Low   • Senile nuclear sclerosis 05/05/2015     Priority: Low   • Hypothyroidism 05/21/2014     Priority: Low   • Chronic anticoagulation 11/14/2013     Priority: Low   • 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/02/2013     Priority: Low   • Spinal stenosis, multilevel 07/02/2013     Priority: Low   • Personal history of actinic keratosis 05/15/2012     Priority: Low   • Menopause 01/12/2012     Priority: Low   • Eosinophilic disorder 05/03/2011     Priority: Low   • Glaucoma 05/03/2011     Priority: Low   • Macular degeneration 05/03/2011     Priority: Low   • Lupus (systemic lupus erythematosus) (CMS-HCC) 10/23/2009     Priority: Low   • Pain in both feet 11/09/2017   • Persistent atrial fibrillation (CMS-HCC) 10/31/2017       Current Outpatient Prescriptions   Medication Sig Dispense Refill   • warfarin (COUMADIN) 2.5 MG Tab Take 1 to 1.5 tablets by mouth once daily as directed by the coumadin clinic 135 Tab 1   • verapamil (ISOPTIN) 120 MG Tab Take 1 Tab by mouth See Admin Instructions. Take 240 mg every  morning, take 180 mg at noon, take 180 mg at 8:00pm 450 Tab 1   • Multiple Vitamins-Minerals (ICAPS AREDS 2) Cap Take 2 Caps by mouth every day. 100 Cap 3   • potassium chloride SA (KDUR) 20 MEQ Tab CR Take 3 Tabs by mouth 3 times a day. 180 Tab 3   • calcium carbonate (OS-BRAYDON 500) 500 MG Tab Take 1 Tab by mouth 2 times a day, with meals. 90 Tab 3   • estradiol (ESTRACE) 2 MG Tab TAKE ONE TABLET BY MOUTH EVERY DAY 90 Tab 1   • furosemide (LASIX) 40 MG Tab TAKE THREE TABLETS BY MOUTH DAILY 270 Tab 3   • ranitidine (ZANTAC) 150 MG Tab Take 1 Tab by mouth every day. 90 Tab 3   • lisinopril (PRINIVIL) 5 MG Tab Take 1 Tab by mouth every day. 90 Tab 2   • levothyroxine (SYNTHROID) 88 MCG Tab TAKE  ONE TABLET BY MOUTH EVERY MORNING BEFORE BREAKFAST 90 Tab 3   • magnesium oxide (MAG-OX) 400 MG Tab TAKE ONE TABLET BY MOUTH EVERY DAY 90 Tab 3   • fexofenadine (ALLEGRA) 60 MG Tab Take 1 Tab by mouth as needed. For allergies  Indications: Hayfever 180 Tab 3   • digoxin (LANOXIN) 125 MCG Tab TAKE ONE TABLET BY MOUTH EVERY DAY 90 Tab 3   • sertraline (ZOLOFT) 25 MG tablet TAKE ONE TABLET BY MOUTH EVERY DAY 90 Tab 1   • albuterol 108 (90 BASE) MCG/ACT Aero Soln inhalation aerosol Inhale 2 Puffs by mouth every 6 hours as needed for Shortness of Breath.     • docusate sodium (COLACE) 100 MG Cap Take 200 mg by mouth every evening.     • Diclofenac Sodium 1 % Gel Apply 2 g to affected area(s) as needed (For joint pain).     • albuterol (PROVENTIL) 2.5mg/3ml Nebu Soln solution for nebulization 2.5 mg by Nebulization route every four hours as needed for Shortness of Breath.     • acetaminophen (TYLENOL) 500 MG TABS Take 1,000 mg by mouth 3 times a day. Indications: Pain     • MAGOX 400 400 (241.3 MG) MG TABS tablet Take 400 mg by mouth every day.  3   • sennosides-docusate sodium (SENOKOT-S) 8.6-50 MG tablet Take 3 Tabs by mouth every day. 30 Tab    • vitamin D (CHOLECALCIFEROL) 1000 UNIT TABS Take 2,000 Units by mouth every day.        No current facility-administered medications for this visit.         Patient is taking medications as noted in medication list.  Current supplements as per medication list.     Allergies: Amiodarone; Atorvastatin calcium-polysorbate 80; Augmentin; Bactrim; Cipro xr; Claritin-d [loratadine-pseudoephedrine]; Clotrimazole; Diltiazem; Flecainide; Keflex; Lipitor [atorvastatin calcium]; Metoprolol; Morphine; Mucinex; Qvar [beclomethasone dipropionate]; Tape; Tramadol; and Vibramycin    Current social contact/activities: kayla party, go to dinner, visit family     Is patient current with immunizations? Yes.    She  reports that she has never smoked. She has never used smokeless tobacco. She reports that she does not drink alcohol or use drugs.  Counseling given: Yes        DPA/Advanced directive: Patient does not have an Advanced Directive.  A packet and workshop information was given on Advanced Directives.    ROS:    Gait: Uses a walker   Ostomy: no   Other tubes: no   Amputations: no   Chronic oxygen use no   Last eye exam 2017   Wears hearing aids: no   : Reports urinary leakage during the last 6 months that has not interfered at all with their daily activities or sleep.      Screening:    DEPRESSION     Is patient seeing a counselor, psychiatrist or other healthcare provider regarding their mental health? No, and not interested.     Depression Screen (PHQ-2/PHQ-9) 11/30/2015 3/8/2017 12/5/2017   PHQ-2 Total Score 0 - -   PHQ-2 Total Score - 0 0   PHQ-9 Total Score - - -       Interpretation of PHQ-9 Total Score   Score Severity   1-4 No Depression   5-9 Mild Depression   10-14 Moderate Depression   15-19 Moderately Severe Depression   20-27 Severe Depression      Depression Screening    Little interest or pleasure in doing things?  0 - not at all  Feeling down, depressed, or hopeless? 0 - not at all  Patient Health Questionnaire Score: 0    If depressive symptoms identified deferred to follow up visit  unless specifically addressed in assessment and plan.    Interpretation of PHQ-9 Total Score   Score Severity   1-4 No Depression   5-9 Mild Depression   10-14 Moderate Depression   15-19 Moderately Severe Depression   20-27 Severe Depression    Screening for Cognitive Impairment    Three Minute Recall (apple, watch, sherrie)  3/3 Staple fence green  Draw clock face with all 12 numbers set to the hand to show 10 minutes past 11 o'clock  1 5/5  If cognitive concerns identified, deferred for follow up unless specifically addressed in assessment and plan.    Fall Risk Assessment    Has the patient had two or more falls in the last year or any fall with injury in the last year?  No  If fall risk identified, deferred for follow up unless specifically addressed in assessment and plan.    Safety Assessment    Throw rugs on floor.  Yes  Handrails on all stairs.  No  Good lighting in all hallways.  Yes  Difficulty hearing.  No  Patient counseled about all safety risks that were identified.    Functional Assessment ADLs    Are there any barriers preventing you from cooking for yourself or meeting nutritional needs?   .    Are there any barriers preventing you from driving safely or obtaining transportation?   .    Are there any barriers preventing you from using a telephone or calling for help?   .    Are there any barriers preventing you from shopping?   .    Are there any barriers preventing you from taking care of your own finances?   .    Are there any barriers preventing you from managing your medications?   .    Are you currently engaging any exercise or physical activity?   .       Health Maintenance Summary                Annual Wellness Visit Overdue 1940     COLONOSCOPY Postponed 9/28/2018 Originally 9/29/2015. Patient Refused     Done 9/29/2005 AMB REFERRAL TO GI FOR COLONOSCOPY    IMM ZOSTER VACCINE Postponed 9/28/2018 Originally 11/16/1998. Patient Refused    MAMMOGRAM Next Due 12/7/2017      Done 12/7/2016  MA-MAMMO SCREENING BILAT W/TOMOSYNTHESIS W/CAD     Patient has more history with this topic...    IMM DTaP/Tdap/Td Vaccine Next Due 5/15/2022      Done 5/15/2012 Imm Admin: Tdap Vaccine    BONE DENSITY Next Due 8/9/2022      Done 8/9/2017 DS-BONE DENSITY STUDY (DEXA)     Patient has more history with this topic...          Patient Care Team:  Kishore Price M.D. as PCP - [facility]  Dmitry Bejarano M.D. as Consulting Physician (Ophthalmology)  Lizzy Haywood M.D. as Consulting Physician (Cardiology)    Social History   Substance Use Topics   • Smoking status: Never Smoker   • Smokeless tobacco: Never Used   • Alcohol use No     Family History   Problem Relation Age of Onset   • Stroke Mother    • Diabetes Father    • Stroke Sister    • Heart Disease Brother    • Stroke Sister    • GI Daughter      Crohn's Disease   • Heart Disease Daughter      CHF   • Other Daughter      Chronic Pain--Lymphedema   • Cancer Paternal Aunt      She  has a past medical history of A-fib (CMS-HCC); Anesthesia; Anticoagulant long-term use (1/12/2012); Arthritis; Asthma; Atrial fibrillation (CMS-HCC); Backpain; Breath shortness; Bronchitis (Nov, 2013); CAD (coronary artery disease); Depression; Glaucoma (5/3/2011); Hematoma complicating a procedure (11/3/2012); High cholesterol; Hypertension; Hypothyroid; Indigestion; Lupus; Macular degeneration; Menopause (1/12/2012); Mitral regurgitation (10/30/2012); Obesity (1/12/2012); Pneumonia (feb,2013); Pulmonary hypertension (10/30/2012); PVC's (premature ventricular contractions) (1/12/2012); Senile nuclear sclerosis; Spinal stenosis of lumbar region at multiple levels; Unspecified cataract; Unspecified disorder of thyroid; and Unspecified urinary incontinence.   Past Surgical History:   Procedure Laterality Date   • KNEE ARTHROPLASTY TOTAL Right 6/23/2016    Procedure: KNEE ARTHROPLASTY TOTAL;  Surgeon: Heriberto Lozada M.D.;  Location: SURGERY [facility]  "[place] [facility];  Service:    • KNEE ARTHROPLASTY TOTAL Left 5/28/2015    Procedure: KNEE ARTHROPLASTY TOTAL;  Surgeon: Heriberto Lozada M.D.;  Location: SURGERY [facility] [place];  Service:    • CATARACT PHACO WITH IOL Right 5/5/2015    Procedure: IOL OD - STANDARD;  Surgeon: Dmitry Bejarano M.D.;  Location: SURGERY [facility];  Service:    • CATARACT PHACO WITH IOL  4/21/2015    Performed by Dmitry Bejarano M.D. at [facility] ORS   • RECOVERY  11/30/2010    Performed by SURGERY, [facility]-RECOVERY at [facility] SAME DAY [facility] ORS   • COLONOSCOPY  2008    Normal    [facility]   • ABDOMINAL HYSTERECTOMY TOTAL  April 15,1975    still has ovaries   • OPEN REDUCTION      left ankle   • OTHER      Removed pins from left ankle   • TONSILLECTOMY AND ADENOIDECTOMY       Anticoagulation Monitoring on 12/05/2017   Component Date Value   • INR 12/05/2017 2.7    Anticoagulation Monitoring on 11/28/2017   Component Date Value   • INR 11/28/2017 1.8    Anticoagulation Monitoring on 11/28/2017   Component Date Value   • INR 11/28/2017 1.8    Anticoagulation Monitoring on 11/21/2017   Component Date Value   • INR 11/21/2017 1.9    Anticoagulation Monitoring on 11/07/2017   Component Date Value   • INR 11/07/2017 2.0          Exam:     Blood pressure 134/72, pulse (!) 126, temperature 36.2 °C (97.2 °F), height 1.626 m (5' 4\"), weight 81.6 kg (180 lb), last menstrual period 01/01/1993, SpO2 96 %. Body mass index is 30.9 kg/m².    Hearing good.    Dentition good  Alert, oriented in no acute distress.  Eye contact is good, speech goal directed, affect calm    Physical Exam   Constitutional: She is oriented. She appears well-developed and well-nourished. No distress.   HENT:  Head: Normocephalic and atraumatic.   Right Ear: External ear normal. Ear canal and TM normal   Left Ear: External ear normal. Ear canal and TM normal  Nose: Nose normal.   Mouth/Throat: Oropharynx is clear and moist.   Eyes: Conjunctivae and extraocular " motions are normal. Pupils are equal, round, and reactive to light. Fundi benign bilaterally   Neck: No thyromegaly present.   Cardiovascular: Rate of 120 bpm, irregular rhythm, normal heart sounds and intact distal pulses.  Exam reveals no gallop.    No murmur heard.  Pulmonary/Chest: Effort normal and breath sounds normal. No respiratory distress. She has no wheezes. She has no rales.   Abdominal: Soft. Bowel sounds are normal. No hepatosplenomegaly She exhibits no distension. No tenderness. She has no rebound and no guarding.   Musculoskeletal: Normal range of motion. She exhibits no edema and no tenderness.   Lymphadenopathy:     She has no cervical adenopathy.   No supraclavicular adenopathy  Neurological: She is alert and oriented. She has normal reflexes.        Babinskis downgoing bilaterally   Skin: Skin is warm and dry. No rash noted. No erythema.   Psychiatric: She has a normal mood and appropriate affect. Her behavior is normal. Judgment and thought content normal.     Assessment and Plan. The following treatment and monitoring plan is recommended:    1. Persistent atrial fibrillation (CMS-HCC)   Will attempt to bring patient back to sinus rhythm later next week by our cardiology department    2. Acquired hypothyroidism   Doing well    3. Essential hypertension   Controlled    4. Coronary artery disease involving native coronary artery of native heart without angina pectoris   Doing well    5. Pain in both feet   Continue to see Dr. Bergman   6. Chronic atrial fibrillation (CMS-HCC)  digoxin (LANOXIN) 125 MCG Tab   7. Mild single current episode of major depressive disorder (CMS-[facility])  sertraline (ZOLOFT) 25 MG tablet           Services suggested: No services needed at this time  Health Care Screening recommendations as per orders if indicated.  Referrals offered: PT/OT/Nutrition counseling/Behavioral Health/Smoking cessation as per orders if indicated.    Discussion today about general wellness and  lifestyle habits:    · Prevent falls and reduce trip hazards; Cautioned about securing or removing rugs.  · Have a working fire alarm and carbon monoxide detector;   · Engage in regular physical activity and social activities       Follow-up:    recheck one year or when necessary    Please note that this dictation was created using voice recognition software. I have worked with consultants from the vendor as well as technical experts from [facility] to optimize the interface. I have made every reasonable attempt to correct obvious errors, but I expect that there are errors of grammar and possibly content that I did not discover before finalizing the note.

## 2017-12-11 ENCOUNTER — ANTICOAGULATION MONITORING (OUTPATIENT)
Dept: VASCULAR LAB | Facility: MEDICAL CENTER | Age: 77
End: 2017-12-11

## 2017-12-11 DIAGNOSIS — Z79.01 CHRONIC ANTICOAGULATION: ICD-10-CM

## 2017-12-11 DIAGNOSIS — Z01.810 PRE-OPERATIVE CARDIOVASCULAR EXAMINATION: ICD-10-CM

## 2017-12-11 DIAGNOSIS — Z01.812 PRE-OPERATIVE LABORATORY EXAMINATION: ICD-10-CM

## 2017-12-11 LAB
ALBUMIN SERPL BCP-MCNC: 3.7 G/DL (ref 3.2–4.9)
ALBUMIN/GLOB SERPL: 1.4 G/DL
ALP SERPL-CCNC: 75 U/L (ref 30–99)
ALT SERPL-CCNC: 12 U/L (ref 2–50)
ANION GAP SERPL CALC-SCNC: 9 MMOL/L (ref 0–11.9)
AST SERPL-CCNC: 15 U/L (ref 12–45)
BASOPHILS # BLD AUTO: 0.6 % (ref 0–1.8)
BASOPHILS # BLD: 0.08 K/UL (ref 0–0.12)
BILIRUB SERPL-MCNC: 0.4 MG/DL (ref 0.1–1.5)
BUN SERPL-MCNC: 17 MG/DL (ref 8–22)
CALCIUM SERPL-MCNC: 9.5 MG/DL (ref 8.5–10.5)
CHLORIDE SERPL-SCNC: 99 MMOL/L (ref 96–112)
CO2 SERPL-SCNC: 27 MMOL/L (ref 20–33)
CREAT SERPL-MCNC: 0.87 MG/DL (ref 0.5–1.4)
EKG IMPRESSION: NORMAL
EOSINOPHIL # BLD AUTO: 0.16 K/UL (ref 0–0.51)
EOSINOPHIL NFR BLD: 1.1 % (ref 0–6.9)
ERYTHROCYTE [DISTWIDTH] IN BLOOD BY AUTOMATED COUNT: 48.6 FL (ref 35.9–50)
GFR SERPL CREATININE-BSD FRML MDRD: >60 ML/MIN/1.73 M 2
GLOBULIN SER CALC-MCNC: 2.7 G/DL (ref 1.9–3.5)
GLUCOSE SERPL-MCNC: 97 MG/DL (ref 65–99)
HCT VFR BLD AUTO: 42.8 % (ref 37–47)
HGB BLD-MCNC: 14.3 G/DL (ref 12–16)
IMM GRANULOCYTES # BLD AUTO: 0.1 K/UL (ref 0–0.11)
IMM GRANULOCYTES NFR BLD AUTO: 0.7 % (ref 0–0.9)
INR PPP: 2.26 (ref 0.87–1.13)
LYMPHOCYTES # BLD AUTO: 4.47 K/UL (ref 1–4.8)
LYMPHOCYTES NFR BLD: 31.8 % (ref 22–41)
MCH RBC QN AUTO: 32.3 PG (ref 27–33)
MCHC RBC AUTO-ENTMCNC: 33.4 G/DL (ref 33.6–35)
MCV RBC AUTO: 96.6 FL (ref 81.4–97.8)
MONOCYTES # BLD AUTO: 1.07 K/UL (ref 0–0.85)
MONOCYTES NFR BLD AUTO: 7.6 % (ref 0–13.4)
NEUTROPHILS # BLD AUTO: 8.18 K/UL (ref 2–7.15)
NEUTROPHILS NFR BLD: 58.2 % (ref 44–72)
NRBC # BLD AUTO: 0 K/UL
NRBC BLD AUTO-RTO: 0 /100 WBC
PLATELET # BLD AUTO: 334 K/UL (ref 164–446)
PMV BLD AUTO: 9.9 FL (ref 9–12.9)
POTASSIUM SERPL-SCNC: 3.8 MMOL/L (ref 3.6–5.5)
PROT SERPL-MCNC: 6.4 G/DL (ref 6–8.2)
PROTHROMBIN TIME: 24.6 SEC (ref 12–14.6)
RBC # BLD AUTO: 4.43 M/UL (ref 4.2–5.4)
SODIUM SERPL-SCNC: 135 MMOL/L (ref 135–145)
WBC # BLD AUTO: 14.1 K/UL (ref 4.8–10.8)

## 2017-12-11 PROCEDURE — 93005 ELECTROCARDIOGRAM TRACING: CPT

## 2017-12-11 PROCEDURE — 93010 ELECTROCARDIOGRAM REPORT: CPT | Performed by: INTERNAL MEDICINE

## 2017-12-11 PROCEDURE — 80053 COMPREHEN METABOLIC PANEL: CPT

## 2017-12-11 PROCEDURE — 85025 COMPLETE CBC W/AUTO DIFF WBC: CPT

## 2017-12-11 PROCEDURE — 85610 PROTHROMBIN TIME: CPT

## 2017-12-11 PROCEDURE — 36415 COLL VENOUS BLD VENIPUNCTURE: CPT

## 2017-12-12 ENCOUNTER — TELEPHONE (OUTPATIENT)
Dept: CARDIOLOGY | Facility: MEDICAL CENTER | Age: 77
End: 2017-12-12

## 2017-12-12 NOTE — PROGRESS NOTES
Anticoagulation Summary  As of 12/11/2017    INR goal:   2.0-3.0   TTR:   68.8 % (2.5 y)   Today's INR:   2.26   Maintenance plan:   3.75 mg (2.5 mg x 1.5) on Mon, Thu; 2.5 mg (2.5 mg x 1) all other days   Weekly total:   20 mg   Plan last modified:   Wayne Cheng, Tim (11/21/2017)   Next INR check:   12/18/2017   Target end date:   Indefinite    Indications    Atrial fibrillation (CMS-HCC) (Resolved) [I48.91]  Chronic anticoagulation [Z79.01]             Anticoagulation Episode Summary     INR check location:   Home Draw    Preferred lab:       Send INR reminders to:       Comments:   Winston YEAGER      Anticoagulation Care Providers     Provider Role Specialty Phone number    Lizzy Haywood M.D. Referring Cardiology [phone]    [facility] Responsible  [phone]    Peter HollowayD Responsible          Anticoagulation Patient Findings    Spoke to patient on phone. No current signs of bleeding. No interval medication changes. Continue current dose of warfarin. Follow up INR in 1 weeks. Answered several question regarding upcoming ablation procedure.    Wayne Cheng, PeterD

## 2017-12-12 NOTE — TELEPHONE ENCOUNTER
Pt. Is scheduled for a fib/flutter ablation tomorrow. WBC's=14.1. Previous WBC's=13.9 on 7-10-17 =13.9. Other pre-procedure labs within normal limits. To Dr. Matthews

## 2017-12-13 ENCOUNTER — HOSPITAL ENCOUNTER (OUTPATIENT)
Facility: MEDICAL CENTER | Age: 77
End: 2017-12-14
Attending: INTERNAL MEDICINE | Admitting: INTERNAL MEDICINE
Payer: MEDICARE

## 2017-12-13 LAB
ACT BLD: 257 SEC (ref 74–137)
ACT BLD: 296 SEC (ref 74–137)
ACT BLD: 296 SEC (ref 74–137)
ACT BLD: 323 SEC (ref 74–137)
EKG IMPRESSION: NORMAL
INR PPP: 1.84 (ref 0.87–1.13)
PROTHROMBIN TIME: 20.9 SEC (ref 12–14.6)

## 2017-12-13 PROCEDURE — 700112 HCHG RX REV CODE 229: Performed by: INTERNAL MEDICINE

## 2017-12-13 PROCEDURE — C1893 INTRO/SHEATH, FIXED,NON-PEEL: HCPCS

## 2017-12-13 PROCEDURE — C1732 CATH, EP, DIAG/ABL, 3D/VECT: HCPCS

## 2017-12-13 PROCEDURE — 00537 ANES CARDIAC EP PROCEDURES: CPT

## 2017-12-13 PROCEDURE — C1730 CATH, EP, 19 OR FEW ELECT: HCPCS

## 2017-12-13 PROCEDURE — 304952 HCHG R 2 PADS

## 2017-12-13 PROCEDURE — A9270 NON-COVERED ITEM OR SERVICE: HCPCS | Performed by: INTERNAL MEDICINE

## 2017-12-13 PROCEDURE — 700111 HCHG RX REV CODE 636 W/ 250 OVERRIDE (IP): Performed by: INTERNAL MEDICINE

## 2017-12-13 PROCEDURE — G0378 HOSPITAL OBSERVATION PER HR: HCPCS

## 2017-12-13 PROCEDURE — 700101 HCHG RX REV CODE 250

## 2017-12-13 PROCEDURE — 93321 DOPPLER ECHO F-UP/LMTD STD: CPT

## 2017-12-13 PROCEDURE — 305387 HCHG SUTURES

## 2017-12-13 PROCEDURE — 96374 THER/PROPH/DIAG INJ IV PUSH: CPT | Mod: XU

## 2017-12-13 PROCEDURE — 93623 PRGRMD STIMJ&PACG IV RX NFS: CPT

## 2017-12-13 PROCEDURE — 360980 HCHG SINGLE TRANSDUCER

## 2017-12-13 PROCEDURE — 93655 ICAR CATH ABLTJ DSCRT ARRHYT: CPT

## 2017-12-13 PROCEDURE — 700111 HCHG RX REV CODE 636 W/ 250 OVERRIDE (IP)

## 2017-12-13 PROCEDURE — 93005 ELECTROCARDIOGRAM TRACING: CPT | Mod: XU | Performed by: INTERNAL MEDICINE

## 2017-12-13 PROCEDURE — 02K83ZZ MAP CONDUCTION MECHANISM, PERCUTANEOUS APPROACH: ICD-10-PCS | Performed by: INTERNAL MEDICINE

## 2017-12-13 PROCEDURE — 360995 HCHG BAYLIS TRANSSEPTAL NEEDLE

## 2017-12-13 PROCEDURE — 85610 PROTHROMBIN TIME: CPT

## 2017-12-13 PROCEDURE — 93657 TX L/R ATRIAL FIB ADDL: CPT

## 2017-12-13 PROCEDURE — 85347 COAGULATION TIME ACTIVATED: CPT | Mod: 91

## 2017-12-13 PROCEDURE — 93312 ECHO TRANSESOPHAGEAL: CPT | Mod: XU

## 2017-12-13 PROCEDURE — 4A0234Z MEASUREMENT OF CARDIAC ELECTRICAL ACTIVITY, PERCUTANEOUS APPROACH: ICD-10-PCS | Performed by: INTERNAL MEDICINE

## 2017-12-13 PROCEDURE — 160002 HCHG RECOVERY MINUTES (STAT)

## 2017-12-13 PROCEDURE — 93325 DOPPLER ECHO COLOR FLOW MAPG: CPT

## 2017-12-13 PROCEDURE — 93662 INTRACARDIAC ECG (ICE): CPT

## 2017-12-13 PROCEDURE — 93656 COMPRE EP EVAL ABLTJ ATR FIB: CPT

## 2017-12-13 PROCEDURE — C1759 CATH, INTRA ECHOCARDIOGRAPHY: HCPCS

## 2017-12-13 PROCEDURE — 36620 INSERTION CATHETER ARTERY: CPT | Mod: XU

## 2017-12-13 PROCEDURE — 700102 HCHG RX REV CODE 250 W/ 637 OVERRIDE(OP): Performed by: INTERNAL MEDICINE

## 2017-12-13 PROCEDURE — 305545 HCHG DOUBLE TRANSDUCER

## 2017-12-13 PROCEDURE — C1894 INTRO/SHEATH, NON-LASER: HCPCS

## 2017-12-13 PROCEDURE — 93005 ELECTROCARDIOGRAM TRACING: CPT | Performed by: INTERNAL MEDICINE

## 2017-12-13 PROCEDURE — 93613 INTRACARDIAC EPHYS 3D MAPG: CPT

## 2017-12-13 PROCEDURE — 93010 ELECTROCARDIOGRAM REPORT: CPT | Performed by: INTERNAL MEDICINE

## 2017-12-13 PROCEDURE — 305383 HCHG CARTO3 REF PATCH

## 2017-12-13 PROCEDURE — 02583ZZ DESTRUCTION OF CONDUCTION MECHANISM, PERCUTANEOUS APPROACH: ICD-10-PCS | Performed by: INTERNAL MEDICINE

## 2017-12-13 PROCEDURE — 4A023FZ MEASUREMENT OF CARDIAC RHYTHM, PERCUTANEOUS APPROACH: ICD-10-PCS | Performed by: INTERNAL MEDICINE

## 2017-12-13 RX ORDER — FAMOTIDINE 20 MG/1
20 TABLET, FILM COATED ORAL DAILY
Status: DISCONTINUED | OUTPATIENT
Start: 2017-12-13 | End: 2017-12-14

## 2017-12-13 RX ORDER — ALBUTEROL SULFATE 90 UG/1
2 AEROSOL, METERED RESPIRATORY (INHALATION) EVERY 4 HOURS PRN
Status: DISCONTINUED | OUTPATIENT
Start: 2017-12-13 | End: 2017-12-14 | Stop reason: HOSPADM

## 2017-12-13 RX ORDER — IBUTILIDE FUMARATE 0.1 MG/ML
INJECTION, SOLUTION INTRAVENOUS
Status: COMPLETED
Start: 2017-12-13 | End: 2017-12-13

## 2017-12-13 RX ORDER — WARFARIN SODIUM 2.5 MG/1
2.5-3.75 TABLET ORAL DAILY
COMMUNITY
End: 2018-07-30

## 2017-12-13 RX ORDER — DIGOXIN 125 MCG
125 TABLET ORAL DAILY
Status: DISCONTINUED | OUTPATIENT
Start: 2017-12-14 | End: 2017-12-14 | Stop reason: HOSPADM

## 2017-12-13 RX ORDER — VERAPAMIL HYDROCHLORIDE 120 MG/1
180-240 TABLET, FILM COATED ORAL 3 TIMES DAILY
COMMUNITY
End: 2017-12-15

## 2017-12-13 RX ORDER — PROTAMINE SULFATE 10 MG/ML
INJECTION, SOLUTION INTRAVENOUS
Status: DISCONTINUED
Start: 2017-12-13 | End: 2017-12-13

## 2017-12-13 RX ORDER — LIDOCAINE HYDROCHLORIDE 20 MG/ML
INJECTION, SOLUTION INFILTRATION; PERINEURAL
Status: COMPLETED
Start: 2017-12-13 | End: 2017-12-13

## 2017-12-13 RX ORDER — ACETAMINOPHEN 325 MG/1
650 TABLET ORAL EVERY 6 HOURS PRN
Status: DISCONTINUED | OUTPATIENT
Start: 2017-12-13 | End: 2017-12-14 | Stop reason: HOSPADM

## 2017-12-13 RX ORDER — FUROSEMIDE 80 MG
80 TABLET ORAL EVERY MORNING
Status: DISCONTINUED | OUTPATIENT
Start: 2017-12-14 | End: 2017-12-14 | Stop reason: HOSPADM

## 2017-12-13 RX ORDER — FUROSEMIDE 40 MG/1
40-80 TABLET ORAL 2 TIMES DAILY
COMMUNITY
End: 2018-05-30 | Stop reason: SDUPTHER

## 2017-12-13 RX ORDER — LISINOPRIL 5 MG/1
5 TABLET ORAL DAILY
Status: DISCONTINUED | OUTPATIENT
Start: 2017-12-13 | End: 2017-12-14 | Stop reason: HOSPADM

## 2017-12-13 RX ORDER — DIGOXIN 0.25 MG/ML
250 INJECTION INTRAMUSCULAR; INTRAVENOUS ONCE
Status: COMPLETED | OUTPATIENT
Start: 2017-12-13 | End: 2017-12-13

## 2017-12-13 RX ORDER — VERAPAMIL HYDROCHLORIDE 120 MG/1
240 TABLET, FILM COATED ORAL EVERY MORNING
Status: DISCONTINUED | OUTPATIENT
Start: 2017-12-14 | End: 2017-12-14 | Stop reason: HOSPADM

## 2017-12-13 RX ORDER — AMOXICILLIN 250 MG
3 CAPSULE ORAL DAILY
Status: DISCONTINUED | OUTPATIENT
Start: 2017-12-13 | End: 2017-12-14 | Stop reason: HOSPADM

## 2017-12-13 RX ORDER — HEPARIN SODIUM,PORCINE 1000/ML
VIAL (ML) INJECTION
Status: COMPLETED
Start: 2017-12-13 | End: 2017-12-13

## 2017-12-13 RX ORDER — WARFARIN SODIUM 5 MG/1
5 TABLET ORAL
Status: COMPLETED | OUTPATIENT
Start: 2017-12-13 | End: 2017-12-13

## 2017-12-13 RX ORDER — POTASSIUM CHLORIDE 20 MEQ/1
20 TABLET, EXTENDED RELEASE ORAL 3 TIMES DAILY
Status: DISCONTINUED | OUTPATIENT
Start: 2017-12-13 | End: 2017-12-14 | Stop reason: HOSPADM

## 2017-12-13 RX ORDER — LEVOTHYROXINE SODIUM 88 UG/1
88 TABLET ORAL
Status: DISCONTINUED | OUTPATIENT
Start: 2017-12-14 | End: 2017-12-14 | Stop reason: HOSPADM

## 2017-12-13 RX ORDER — DOCUSATE SODIUM 100 MG/1
200 CAPSULE, LIQUID FILLED ORAL EVERY EVENING
Status: DISCONTINUED | OUTPATIENT
Start: 2017-12-13 | End: 2017-12-14 | Stop reason: HOSPADM

## 2017-12-13 RX ORDER — VERAPAMIL HYDROCHLORIDE 120 MG/1
180 TABLET, FILM COATED ORAL TWICE DAILY
Status: DISCONTINUED | OUTPATIENT
Start: 2017-12-13 | End: 2017-12-14 | Stop reason: HOSPADM

## 2017-12-13 RX ORDER — FUROSEMIDE 40 MG/1
40 TABLET ORAL DAILY
Status: DISCONTINUED | OUTPATIENT
Start: 2017-12-13 | End: 2017-12-14 | Stop reason: HOSPADM

## 2017-12-13 RX ADMIN — MAGNESIUM GLUCONATE 500 MG ORAL TABLET 400 MG: 500 TABLET ORAL at 20:45

## 2017-12-13 RX ADMIN — SERTRALINE 25 MG: 50 TABLET, FILM COATED ORAL at 20:44

## 2017-12-13 RX ADMIN — LIDOCAINE HYDROCHLORIDE: 20 INJECTION, SOLUTION INFILTRATION; PERINEURAL at 08:31

## 2017-12-13 RX ADMIN — POTASSIUM CHLORIDE 20 MEQ: 1500 TABLET, EXTENDED RELEASE ORAL at 20:42

## 2017-12-13 RX ADMIN — VERAPAMIL HYDROCHLORIDE 180 MG: 120 TABLET, FILM COATED ORAL at 20:45

## 2017-12-13 RX ADMIN — WARFARIN SODIUM 5 MG: 5 TABLET ORAL at 20:46

## 2017-12-13 RX ADMIN — IBUTILIDE FUMARATE 1 MG: 0.1 INJECTION, SOLUTION INTRAVENOUS at 11:11

## 2017-12-13 RX ADMIN — HEPARIN SODIUM 6000 UNITS: 200 INJECTION, SOLUTION INTRAVENOUS at 07:58

## 2017-12-13 RX ADMIN — DIGOXIN 250 MCG: 0.25 INJECTION INTRAMUSCULAR; INTRAVENOUS at 21:45

## 2017-12-13 RX ADMIN — FAMOTIDINE 20 MG: 20 TABLET, FILM COATED ORAL at 20:43

## 2017-12-13 RX ADMIN — STANDARDIZED SENNA CONCENTRATE AND DOCUSATE SODIUM 3 TABLET: 8.6; 5 TABLET, FILM COATED ORAL at 20:44

## 2017-12-13 RX ADMIN — ENOXAPARIN SODIUM 80 MG: 100 INJECTION SUBCUTANEOUS at 20:47

## 2017-12-13 RX ADMIN — HEPARIN SODIUM: 1000 INJECTION, SOLUTION INTRAVENOUS; SUBCUTANEOUS at 08:31

## 2017-12-13 RX ADMIN — LISINOPRIL 5 MG: 5 TABLET ORAL at 20:45

## 2017-12-13 RX ADMIN — DOCUSATE SODIUM 200 MG: 100 CAPSULE ORAL at 20:43

## 2017-12-13 ASSESSMENT — CHA2DS2 SCORE
CHA2DS2 VASC SCORE: 5
AGE 75 OR GREATER: YES
SEX: FEMALE
DIABETES: NO
AGE 65 TO 74: NO
VASCULAR DISEASE: YES
PRIOR STROKE OR TIA OR THROMBOEMBOLISM: NO
HYPERTENSION: YES

## 2017-12-13 ASSESSMENT — LIFESTYLE VARIABLES
ALCOHOL_USE: NO
EVER_SMOKED: NEVER

## 2017-12-13 ASSESSMENT — PAIN SCALES - GENERAL
PAINLEVEL_OUTOF10: 0

## 2017-12-13 ASSESSMENT — COGNITIVE AND FUNCTIONAL STATUS - GENERAL
HELP NEEDED FOR BATHING: A LITTLE
PERSONAL GROOMING: A LITTLE
DAILY ACTIVITIY SCORE: 18
SUGGESTED CMS G CODE MODIFIER DAILY ACTIVITY: CK
STANDING UP FROM CHAIR USING ARMS: A LITTLE
MOVING TO AND FROM BED TO CHAIR: A LITTLE
MOVING FROM LYING ON BACK TO SITTING ON SIDE OF FLAT BED: A LITTLE
CLIMB 3 TO 5 STEPS WITH RAILING: A LITTLE
MOBILITY SCORE: 18
DRESSING REGULAR UPPER BODY CLOTHING: A LITTLE
WALKING IN HOSPITAL ROOM: A LITTLE
EATING MEALS: A LITTLE
TOILETING: A LITTLE
TURNING FROM BACK TO SIDE WHILE IN FLAT BAD: A LITTLE
SUGGESTED CMS G CODE MODIFIER MOBILITY: CK
DRESSING REGULAR LOWER BODY CLOTHING: A LITTLE

## 2017-12-13 ASSESSMENT — PATIENT HEALTH QUESTIONNAIRE - PHQ9
2. FEELING DOWN, DEPRESSED, IRRITABLE, OR HOPELESS: NOT AT ALL
1. LITTLE INTEREST OR PLEASURE IN DOING THINGS: NOT AT ALL
SUM OF ALL RESPONSES TO PHQ QUESTIONS 1-9: 0
SUM OF ALL RESPONSES TO PHQ9 QUESTIONS 1 AND 2: 0

## 2017-12-13 NOTE — PROGRESS NOTES
Pt. A&O x 4. VS'S RA. Patient placed on telemetry monitor NSR. BL femoral cath sites CDI& Soft. Pedal pulses +2. No complaints of pain or SOB at this time. POC discussed with patient. Pt verbalizes understanding. Call light and belongings with in reach. Bed locked and in lowest position, alarm and fall precautions in place.

## 2017-12-13 NOTE — H&P
Physician H&P    Patient ID:  Donte Alicea [facility]  [id number]  77 y.o. female  1940    History:  Primary Diagnosis: Recurrent persistent atrial fib for RFA    HPI:  Multiple AA med intolerance with symptomatic persistent a fib for RFA. On coumadin. Fatigue and malaise with a fib.    Past Medical History:  has a past medical history of A-fib (CMS-HCC); Anesthesia; Anticoagulant long-term use (1/12/2012); Arthritis; Asthma; Atrial fibrillation (CMS-HCC); Backpain; Breath shortness; Bronchitis (Nov, 2013); CAD (coronary artery disease); Depression; Glaucoma (5/3/2011); Hematoma complicating a procedure (11/3/2012); High cholesterol; Hypertension; Hypothyroid; Lupus; Macular degeneration; Menopause (1/12/2012); Mitral regurgitation (10/30/2012); Obesity (1/12/2012); Pneumonia (feb,2013); Pulmonary hypertension (10/30/2012); PVC's (premature ventricular contractions) (1/12/2012); Senile nuclear sclerosis; Spinal stenosis of lumbar region at multiple levels; Unspecified cataract; and Unspecified urinary incontinence.  Past Surgical History:  has a past surgical history that includes tonsillectomy and adenoidectomy; recovery (11/30/2010); colonoscopy (2008); abdominal hysterectomy total (April 15,1975); other; cataract phaco with iol (4/21/2015); cataract phaco with iol (Right, 5/5/2015); open reduction; knee arthroplasty total (Left, 5/28/2015); and knee arthroplasty total (Right, 6/23/2016).  Past Social History:  reports that she has never smoked. She has never used smokeless tobacco. She reports that she does not drink alcohol or use drugs.  Past Family History:   Family History   Problem Relation Age of Onset   • Stroke Mother    • Diabetes Father    • Stroke Sister    • Heart Disease Brother    • Stroke Sister    • GI Daughter      Crohn's Disease   • Heart Disease Daughter      CHF   • Other Daughter      Chronic Pain--Lymphedema   • Cancer Paternal Aunt      Allergies: Amiodarone; Bactrim; Claritin-d  [loratadine-pseudoephedrine]; Clotrimazole; Metoprolol; Morphine; Qvar [beclomethasone dipropionate]; Vibramycin; Atorvastatin calcium-polysorbate 80; Augmentin; Cipro xr; Diltiazem; Flecainide; Keflex; Mucinex; Tramadol; and Tape    Current Medications:  Prior to Admission medications    Medication Sig Start Date End Date Taking? Authorizing Provider   sertraline (ZOLOFT) 25 MG tablet Take 1 Tab by mouth every day. 12/5/17   Kishore Price M.D.   digoxin (LANOXIN) 125 MCG Tab Take 1 Tab by mouth every day. 12/5/17   Kishore Price M.D.   warfarin (COUMADIN) 2.5 MG Tab Take 1 to 1.5 tablets by mouth once daily as directed by the coumadin clinic 11/20/17   SUSI Sapp.P.NLeslye   verapamil (ISOPTIN) 120 MG Tab Take 1 Tab by mouth See Admin Instructions. Take 240 mg every morning, take 180 mg at noon, take 180 mg at 8:00pm 9/25/17   Lizzy Haywood M.D.   Multiple Vitamins-Minerals (ICAPS AREDS 2) Cap Take 2 Caps by mouth every day. 8/14/17   Lisa Romano, A.P.R.N.   potassium chloride SA (KDUR) 20 MEQ Tab CR Take 20 mEq by mouth 3 times a day. 8/14/17   Lisa Romano, A.P.R.N.   estradiol (ESTRACE) 2 MG Tab TAKE ONE TABLET BY MOUTH EVERY DAY 7/31/17   Kishore Price M.D.   furosemide (LASIX) 40 MG Tab TAKE THREE TABLETS BY MOUTH DAILY 7/26/17   Kishore Price M.D.   ranitidine (ZANTAC) 150 MG Tab Take 1 Tab by mouth every day. 7/26/17   Kishore Price M.D.   lisinopril (PRINIVIL) 5 MG Tab Take 1 Tab by mouth every day. 6/25/17   Lizzy Haywood M.D.   levothyroxine (SYNTHROID) 88 MCG Tab TAKE  ONE TABLET BY MOUTH EVERY MORNING BEFORE BREAKFAST 6/5/17   Kishore Price M.D.   magnesium oxide (MAG-OX) 400 MG Tab TAKE ONE TABLET BY MOUTH EVERY DAY 5/1/17   Kishore Price M.D.   fexofenadine (ALLEGRA) 60 MG Tab Take 1 Tab by mouth as needed. For allergies  Indications: Hayfever 3/8/17   Kishore Price M.D.   albuterol 108 (90 BASE)  "MCG/ACT Aero Soln inhalation aerosol Inhale 2 Puffs by mouth every 6 hours as needed for Shortness of Breath.    Physician Outpatient   docusate sodium (COLACE) 100 MG Cap Take 200 mg by mouth every evening.    Physician Outpatient   Diclofenac Sodium 1 % Gel Apply  to affected area(s) as needed (For joint pain).    Physician Outpatient   albuterol (PROVENTIL) 2.5mg/3ml Nebu Soln solution for nebulization 2.5 mg by Nebulization route every four hours as needed for Shortness of Breath.    Physician Outpatient   acetaminophen (TYLENOL) 500 MG TABS Take 1,000 mg by mouth 3 times a day. Indications: Pain    Physician Outpatient   sennosides-docusate sodium (SENOKOT-S) 8.6-50 MG tablet Take 3 Tabs by mouth every day. 6/3/15   Vladimir Lee M.D.   vitamin D (CHOLECALCIFEROL) 1000 UNIT TABS Take 2,000 Units by mouth every day.    Physician Outpatient       Review of Systems:  ROS  Blood pressure 146/92, pulse (!) 108, temperature 36.6 °C (97.8 °F), resp. rate 16, height 1.626 m (5' 4\"), weight 81.1 kg (178 lb 12.7 oz), last menstrual period 01/01/1993, SpO2 96 %.    Physical Examination:  Physical Exam   Constitutional: She is oriented to person, place, and time. She appears well-developed. No distress.   HENT:   Mouth/Throat: Mucous membranes are normal.   Eyes: Conjunctivae and EOM are normal.   Neck: No JVD present. No tracheal deviation present. No thyroid mass and no thyromegaly present.   Cardiovascular: Normal rate and intact distal pulses.  A regularly irregular rhythm present.   No murmur heard.  Pulmonary/Chest: Effort normal and breath sounds normal. No respiratory distress. She exhibits no tenderness.   Abdominal: Soft. There is no tenderness.   Musculoskeletal: Normal range of motion. She exhibits no edema.   Neurological: She is alert and oriented to person, place, and time. She has normal strength. She displays no tremor.   Skin: Skin is warm and dry. She is not diaphoretic.   Psychiatric: She has a " normal mood and affect. Her behavior is normal.   Vitals reviewed.      Impression:  1. Recurrent persistent a fib for RFA. Possible need for tikosyn post RFA.  The risks, benefits,and alternatives to atrial fibrillation ablation with general anesthesia were discussed in great detail. Specific risks mentioned including bleeding, infection, arteriovenous fistula/pseudoaneurysm related to sheath placement, cardiac perforation with possible tamponade requiring pericardiocentesis or possible open heart surgery were discussed. In addition,we discussed atrial fibrillation ablation specific complications including pulmonary vein stenosis, left atrial perforation (2-4%), and nerve damage involving the recurrent laryngeal nerve, the vagus nerve with resulting gastroparesis, and the phrenic nerve.  Also mentioned was a 1/400 (0.25%) occurrence of atrial esophageal fistula, which is an abnormal communication between the esophagus and the left atrium; this complication is often fatal. Lastly the risks of death, myocardial infarction, stroke, DVT and PE were discussed. The patient verbalized understanding of these potential complications and wishes to proceed with this procedure.  The risks, benefits, and alternatives to transesophageal echocardiogram with IV sedation were discussed with the patient in specific detail, including oropharyngeal and esophageal traumas including hoarseness and dysphagia after the procedure. Rare cases demonstrating serious or fatal complications associated with transesophageal echocardiogram have been reported in the adult population, including cardiac, pulmonary and bleeding complications in less than 1% of people. Patients with an identified intracardiac thrombus are at increased risk for embolic events and this appears to be reduced with anticoagulant therapy. The patient verbalized understandings about these  possible complications and wishes to proceed with this procedure        Pre Procedure  Assessment:  Pre-Procedure Assessment - Applicable for patients receiving sedation by non-anesthesia practitioner.  Previous drug history, other anesthetic experiences, potential anesthetic problems and choice of anesthesia assessed, discussed with patient.     No prior complications.  Results of relevant diagnostic studies reviewed.    Plan of Sedation:  Patient assessed immediately prior to sedationPatient deemed appropriate candidate for conscious sedation options and plans discussed with patient / family    ASA 3 - Patient with severe systemic disease

## 2017-12-13 NOTE — CATH LAB
Electrophysiology Procedure Note    Indication:Persistent atial fibrillation with symptoms and multiple medicine intolerances    Procedure Performed: 1. Atrial fibrillation ablation 2. Advanced mapping using CARTO system  3. Programmed stimuli post drug infusion (Corvert) 4. Transesophageal echocardiography 5. Intracardiac echocardiography 6. Esophageal temperature monitoring 7. Intraarterial blood pressure monitoring 8. Frequent ACT's 9. Ablation of secondary cause of atrial fib (Posterior wall) 10. Ablation of second arrhythmia (LA isthmus flutter)    Physician(s): GABRIEL Matthews M.D.     Resident/Assistant(s): None     Anesthesia: General endotracheal anesthetic.    Anaesthesiologist: Dr MIRTA Carty    Specimen(s) Removed: None     Estimated Blood Loss:  30cc     Complications:  None    Pre-procedure ECG: A fib    Post Procedure ECG: SR    Description of Procedure:   After informed written consent, the patient was brought to the EP lab in the fasting, non-sedated state. The patient was prepped and draped in the usual sterile fashion.  ENA showed no evidence of LA clot. Arterial line placed for monitoring. Esophageal temperature probe placed and monitored.  Femoral venous access was obtained using the modified Seldinger technique. In the left femoral vein, 3 sheaths (6,8,8 Fr) were inserted over 0.35” guidewires. In the right femoral vein, 1 sheaths (10.5 Fr) were inserted over 0.35” guidewires. A deflectable decapolar catheter was advanced to the CS position. An intracardiac echo (ICE) catheter was advanced to the right atrium. ICE was used to identify the atrial septum, left atrial appendage, and pulmonary veins and jaydon the anatomic structures to superimpose on our 3D electroanatomic map using CARTOSound. During the procedure, ICE was utilized to localize the ablation catheter, monitor for thrombus formation, and to exclude pericardial effusion. Intravenous heparin was administered to maintain -350 seconds. Double  transseptal left heart catheterization was performed under intracardiac echo and hemodynamic guidance. A [place] needle was inserted into the two 8 Fr sheaths (SL1) for transseptal puncture and placement of sheaths in the left atrium. Mapping of the pulmonary veins and left atrium was performed using CARTO3 FAM D/F and a deflectable multipolar mapping catheter (Biosense PentaRay). Wide antral circumferential ablation was performed using an 3.5 mm deflectable irrigated force contact catheter (Biosense SmartToInVisioneer) at primarily 25 W (posteriorly) to 30 W ( anteriorly)  power starting from the L sided veins and then proceeding along to the RSPV and then finally the RIPV.  The patient switched to LA isthmus flutter confirmed with mapping and entrainment (clockwise). Ablation of the LA isthmus until isthmus blocked and went proximal to distal on CS. Patient went back into a fib and showed significant fractionation and scarring in the posterior wall. Ablation with isolation performed of the posterior wall. Patient received corvert and was paced into SR. Bidirectional pulmonary vein antrum isolation was verified at the end of the procedure by pacing inside the vein and confirming exit block along with absence of local PV signals on the PentaRay. The posterior wall was also continued to be isolated. LA isthmus continued to break through. Ablation endocardially with block but multiple recurrence. CS ablation at 25 [place] at CS 34 with LA irreversible isthmus block. At the end of the procedure, heparin was reversed with protamine, the catheter and sheaths were removed, and hemostasis was achieved by manual compression. Following recovery from anesthesia, the patient was transferred to the PPU.     Total ablation time: 3159 seconds    Fluoroscopy time: 7.6 minutes     Electrophysiologic Findings:    1. Atrial fib  550 ms,  ms, HV 51 ms.     Impressions:    1. Persistent atrial fibrillation.    2. RF ablation pulmonary vein  isolation procedure.     3. Isolation of posterior wall and LA isthmus ablation.  Recommendations:  1. Resume anticoagulation.  2. Admit to monitored bedrest.

## 2017-12-13 NOTE — OR NURSING
1113 patient arrived from cath lab s/p a.fib ablation, patient aaox4, denies pain, s.o.b, n/v, n/t, bilateral groin dressing clean dry intact, artline right radial,  plan of care discussed with patient agreeable.  1145 artline removed gauze and tegaderm applied  1200 patient tolerating oral fluids and snacks.  1301 bilateral groin dressings clean  1436 report given to Ian DELGADO T813 bed 1  1500 patient transported to room T813 bed 1 with all her personal belongings,   1510 checked bilateral groin dressing with Ian DELGADO no bleeding or hematoma noted, [place] via [place]er tolerated well. Patient attached to monitor box, monitor room notified by DANNY melendez.

## 2017-12-14 VITALS
WEIGHT: 183.86 LBS | SYSTOLIC BLOOD PRESSURE: 140 MMHG | RESPIRATION RATE: 16 BRPM | HEIGHT: 64 IN | DIASTOLIC BLOOD PRESSURE: 70 MMHG | HEART RATE: 117 BPM | TEMPERATURE: 98.6 F | OXYGEN SATURATION: 95 % | BODY MASS INDEX: 31.39 KG/M2

## 2017-12-14 LAB
ALBUMIN SERPL BCP-MCNC: 3.2 G/DL (ref 3.2–4.9)
ALBUMIN/GLOB SERPL: 1.1 G/DL
ALP SERPL-CCNC: 64 U/L (ref 30–99)
ALT SERPL-CCNC: 11 U/L (ref 2–50)
ANION GAP SERPL CALC-SCNC: 12 MMOL/L (ref 0–11.9)
APPEARANCE UR: CLEAR
AST SERPL-CCNC: 27 U/L (ref 12–45)
BASOPHILS # BLD AUTO: 0.3 % (ref 0–1.8)
BASOPHILS # BLD: 0.06 K/UL (ref 0–0.12)
BILIRUB SERPL-MCNC: 0.5 MG/DL (ref 0.1–1.5)
BILIRUB UR QL STRIP.AUTO: NEGATIVE
BUN SERPL-MCNC: 12 MG/DL (ref 8–22)
CALCIUM SERPL-MCNC: 9.1 MG/DL (ref 8.5–10.5)
CHLORIDE SERPL-SCNC: 104 MMOL/L (ref 96–112)
CO2 SERPL-SCNC: 21 MMOL/L (ref 20–33)
COLOR UR: YELLOW
CREAT SERPL-MCNC: 0.52 MG/DL (ref 0.5–1.4)
EKG IMPRESSION: NORMAL
EOSINOPHIL # BLD AUTO: 0 K/UL (ref 0–0.51)
EOSINOPHIL NFR BLD: 0 % (ref 0–6.9)
ERYTHROCYTE [DISTWIDTH] IN BLOOD BY AUTOMATED COUNT: 49.1 FL (ref 35.9–50)
GFR SERPL CREATININE-BSD FRML MDRD: >60 ML/MIN/1.73 M 2
GLOBULIN SER CALC-MCNC: 2.9 G/DL (ref 1.9–3.5)
GLUCOSE SERPL-MCNC: 123 MG/DL (ref 65–99)
GLUCOSE UR STRIP.AUTO-MCNC: NEGATIVE MG/DL
HCT VFR BLD AUTO: 39.9 % (ref 37–47)
HGB BLD-MCNC: 13.3 G/DL (ref 12–16)
IMM GRANULOCYTES # BLD AUTO: 0.12 K/UL (ref 0–0.11)
IMM GRANULOCYTES NFR BLD AUTO: 0.7 % (ref 0–0.9)
INR PPP: 2.14 (ref 0.87–1.13)
KETONES UR STRIP.AUTO-MCNC: NEGATIVE MG/DL
LEUKOCYTE ESTERASE UR QL STRIP.AUTO: NEGATIVE
LYMPHOCYTES # BLD AUTO: 3.08 K/UL (ref 1–4.8)
LYMPHOCYTES NFR BLD: 17.7 % (ref 22–41)
MCH RBC QN AUTO: 32.2 PG (ref 27–33)
MCHC RBC AUTO-ENTMCNC: 33.3 G/DL (ref 33.6–35)
MCV RBC AUTO: 96.6 FL (ref 81.4–97.8)
MICRO URNS: NORMAL
MONOCYTES # BLD AUTO: 1.28 K/UL (ref 0–0.85)
MONOCYTES NFR BLD AUTO: 7.3 % (ref 0–13.4)
NEUTROPHILS # BLD AUTO: 12.91 K/UL (ref 2–7.15)
NEUTROPHILS NFR BLD: 74 % (ref 44–72)
NITRITE UR QL STRIP.AUTO: NEGATIVE
NRBC # BLD AUTO: 0 K/UL
NRBC BLD AUTO-RTO: 0 /100 WBC
PH UR STRIP.AUTO: 6 [PH]
PLATELET # BLD AUTO: 320 K/UL (ref 164–446)
PMV BLD AUTO: 10.2 FL (ref 9–12.9)
POTASSIUM SERPL-SCNC: 4.7 MMOL/L (ref 3.6–5.5)
PROT SERPL-MCNC: 6.1 G/DL (ref 6–8.2)
PROT UR QL STRIP: NEGATIVE MG/DL
PROTHROMBIN TIME: 23.6 SEC (ref 12–14.6)
RBC # BLD AUTO: 4.13 M/UL (ref 4.2–5.4)
RBC UR QL AUTO: NEGATIVE
SODIUM SERPL-SCNC: 137 MMOL/L (ref 135–145)
SP GR UR STRIP.AUTO: 1.01
UROBILINOGEN UR STRIP.AUTO-MCNC: 0.2 MG/DL
WBC # BLD AUTO: 17.5 K/UL (ref 4.8–10.8)

## 2017-12-14 PROCEDURE — 700102 HCHG RX REV CODE 250 W/ 637 OVERRIDE(OP): Performed by: INTERNAL MEDICINE

## 2017-12-14 PROCEDURE — 85610 PROTHROMBIN TIME: CPT

## 2017-12-14 PROCEDURE — 81003 URINALYSIS AUTO W/O SCOPE: CPT

## 2017-12-14 PROCEDURE — G0378 HOSPITAL OBSERVATION PER HR: HCPCS

## 2017-12-14 PROCEDURE — 700102 HCHG RX REV CODE 250 W/ 637 OVERRIDE(OP): Performed by: NURSE PRACTITIONER

## 2017-12-14 PROCEDURE — 85025 COMPLETE CBC W/AUTO DIFF WBC: CPT

## 2017-12-14 PROCEDURE — A9270 NON-COVERED ITEM OR SERVICE: HCPCS | Performed by: INTERNAL MEDICINE

## 2017-12-14 PROCEDURE — 80053 COMPREHEN METABOLIC PANEL: CPT

## 2017-12-14 PROCEDURE — 93005 ELECTROCARDIOGRAM TRACING: CPT | Performed by: INTERNAL MEDICINE

## 2017-12-14 PROCEDURE — 36415 COLL VENOUS BLD VENIPUNCTURE: CPT

## 2017-12-14 PROCEDURE — 93010 ELECTROCARDIOGRAM REPORT: CPT | Performed by: INTERNAL MEDICINE

## 2017-12-14 PROCEDURE — A9270 NON-COVERED ITEM OR SERVICE: HCPCS | Performed by: NURSE PRACTITIONER

## 2017-12-14 RX ORDER — FUROSEMIDE 40 MG/1
40 TABLET ORAL DAILY
Qty: 30 TAB | COMMUNITY
Start: 2017-12-14 | End: 2017-12-15

## 2017-12-14 RX ORDER — OMEPRAZOLE 20 MG/1
20 CAPSULE, DELAYED RELEASE ORAL DAILY
Status: DISCONTINUED | OUTPATIENT
Start: 2017-12-14 | End: 2017-12-14 | Stop reason: HOSPADM

## 2017-12-14 RX ORDER — VERAPAMIL HYDROCHLORIDE 120 MG/1
240 TABLET, FILM COATED ORAL EVERY MORNING
Qty: 90 TAB | Refills: 3 | Status: SHIPPED | OUTPATIENT
Start: 2017-12-15 | End: 2017-12-15

## 2017-12-14 RX ORDER — OMEPRAZOLE 20 MG/1
20 CAPSULE, DELAYED RELEASE ORAL DAILY
Qty: 30 CAP | Refills: 0 | Status: SHIPPED | OUTPATIENT
Start: 2017-12-14 | End: 2017-12-14

## 2017-12-14 RX ORDER — OMEPRAZOLE 20 MG/1
20 CAPSULE, DELAYED RELEASE ORAL DAILY
Qty: 30 CAP | Refills: 0 | Status: SHIPPED | OUTPATIENT
Start: 2017-12-14 | End: 2017-12-15

## 2017-12-14 RX ADMIN — OMEPRAZOLE 20 MG: 20 CAPSULE, DELAYED RELEASE ORAL at 09:10

## 2017-12-14 RX ADMIN — LISINOPRIL 5 MG: 5 TABLET ORAL at 09:09

## 2017-12-14 RX ADMIN — POTASSIUM CHLORIDE 20 MEQ: 1500 TABLET, EXTENDED RELEASE ORAL at 09:13

## 2017-12-14 RX ADMIN — VERAPAMIL HYDROCHLORIDE 240 MG: 120 TABLET, FILM COATED ORAL at 05:27

## 2017-12-14 RX ADMIN — MAGNESIUM GLUCONATE 500 MG ORAL TABLET 400 MG: 500 TABLET ORAL at 09:10

## 2017-12-14 RX ADMIN — FUROSEMIDE 80 MG: 80 TABLET ORAL at 05:28

## 2017-12-14 RX ADMIN — DIGOXIN 125 MCG: 125 TABLET ORAL at 09:10

## 2017-12-14 RX ADMIN — LEVOTHYROXINE SODIUM 88 MCG: 88 TABLET ORAL at 05:27

## 2017-12-14 RX ADMIN — SERTRALINE 25 MG: 50 TABLET, FILM COATED ORAL at 09:09

## 2017-12-14 RX ADMIN — FAMOTIDINE 20 MG: 20 TABLET, FILM COATED ORAL at 09:09

## 2017-12-14 ASSESSMENT — PAIN SCALES - GENERAL
PAINLEVEL_OUTOF10: 0

## 2017-12-14 NOTE — DISCHARGE INSTRUCTIONS
Discharge Instructions    Discharged to home by car with relative. Discharged via wheelchair, hospital escort: Yes.  Special equipment needed: Not Applicable    Be sure to schedule a follow-up appointment with your primary care doctor or any specialists as instructed.     Post ablation instructions: No lifting > 10 lbs x 1 week.  No baths or hot tubs x 1 week.  May shower tomorrow and take off dressings.  Continue to monitor sites daily for warmth, redness, discolored drainage   Please walk and take deep breaths after discharge.   After discharge if you experience chest pain, shortness of breath, hemoptysis, neurological changes, high fever 101 or greater, pre syncope/syncope, trouble with catheter sites needs to be seen in the emergency dept.   CBC next week. INR next week.    Discharge Plan:   Diet Plan: Discussed  Activity Level: Discussed  Confirmed Follow up Appointment: Appointment Scheduled  Confirmed Symptoms Management: Discussed  Medication Reconciliation Updated: Yes  Influenza Vaccine Indication: Not indicated: Previously immunized this influenza season and > 8 years of age ([birth year])    I understand that a diet low in cholesterol, fat, and sodium is recommended for good health. Unless I have been given specific instructions below for another diet, I accept this instruction as my diet prescription.   Other diet: Cardiac    Special Instructions: None    · Is patient discharged on Warfarin / Coumadin?   Yes    You are receiving the drug warfarin. Please understand the importance of monitoring warfarin with scheduled PT/INR blood draws.  Follow-up with a call to your personal Doctor's office in 3 days to schedule a PT/INR. .    IMPORTANT: HOW TO USE THIS INFORMATION:  This is a summary and does NOT have all possible information about this product. This information does not assure that this product is safe, effective, or appropriate for you. This information is not individual medical advice and does not  "substitute for the advice of your health care professional. Always ask your health care professional for complete information about this product and your specific health needs.      WARFARIN - ORAL (WARF-uh-rin)      COMMON BRAND NAME(S): Coumadin      WARNING:  Warfarin can cause very serious (possibly fatal) bleeding. This is more likely to occur when you first start taking this medication or if you take too much warfarin. To decrease your risk for bleeding, your doctor or other health care provider will monitor you closely and check your lab results (INR test) to make sure you are not taking too much warfarin. Keep all medical and laboratory appointments. Tell your doctor right away if you notice any signs of serious bleeding. See also Side Effects section.      USES:  This medication is used to treat blood clots (such as in deep vein thrombosis-DVT or pulmonary embolus-PE) and/or to prevent new clots from forming in your body. Preventing harmful blood clots helps to reduce the risk of a stroke or heart attack. Conditions that increase your risk of developing blood clots include a certain type of irregular heart rhythm (atrial fibrillation), heart valve replacement, recent heart attack, and certain surgeries (such as hip/knee replacement). Warfarin is commonly called a \"blood thinner,\" but the more correct term is \"anticoagulant.\" It helps to keep blood flowing smoothly in your body by decreasing the amount of certain substances (clotting proteins) in your blood.      HOW TO USE:  Read the Medication Guide provided by your pharmacist before you start taking warfarin and each time you get a refill. If you have any questions, ask your doctor or pharmacist. Take this medication by mouth with or without food as directed by your doctor or other health care professional, usually once a day. It is very important to take it exactly as directed. Do not increase the dose, take it more frequently, or stop using it unless " directed by your doctor. Dosage is based on your medical condition, laboratory tests (such as INR), and response to treatment. Your doctor or other health care provider will monitor you closely while you are taking this medication to determine the right dose for you. Use this medication regularly to get the most benefit from it. To help you remember, take it at the same time each day. It is important to eat a balanced, consistent diet while taking warfarin. Some foods can affect how warfarin works in your body and may affect your treatment and dose. Avoid sudden large increases or decreases in your intake of foods high in vitamin K (such as broccoli, cauliflower, cabbage, brussels sprouts, kale, spinach, and other green leafy vegetables, liver, green tea, certain vitamin supplements). If you are trying to lose weight, check with your doctor before you try to go on a diet. Cranberry products may also affect how your warfarin works. Limit the amount of cranberry juice (16 ounces/480 milliliters a day) or other cranberry products you may drink or eat.      SIDE EFFECTS:  Nausea, loss of appetite, or stomach/abdominal pain may occur. If any of these effects persist or worsen, tell your doctor or pharmacist promptly. Remember that your doctor has prescribed this medication because he or she has judged that the benefit to you is greater than the risk of side effects. Many people using this medication do not have serious side effects. This medication can cause serious bleeding if it affects your blood clotting proteins too much (shown by unusually high INR lab results). Even if your doctor stops your medication, this risk of bleeding can continue for up to a week. Tell your doctor right away if you have any signs of serious bleeding, including: unusual pain/swelling/discomfort, unusual/easy bruising, prolonged bleeding from cuts or gums, persistent/frequent nosebleeds, unusually heavy/prolonged menstrual flow, pink/dark  urine, coughing up blood, vomit that is bloody or looks like coffee grounds, severe headache, dizziness/fainting, unusual or persistent tiredness/weakness, bloody/black/tarry stools, chest pain, shortness of breath, difficulty swallowing. Tell your doctor right away if any of these unlikely but serious side effects occur: persistent nausea/vomiting, severe stomach/abdominal pain, yellowing eyes/skin. This drug rarely has caused very serious (possibly fatal) problems if its effects lead to small blood clots (usually at the beginning of treatment). This can lead to severe skin/tissue damage that may require surgery or amputation if left untreated. Patients with certain blood conditions (protein C or S deficiency) may be at greater risk. Get medical help right away if any of these rare but serious side effects occur: painful/red/purplish patches on the skin (such as on the toe, breast, abdomen), change in the amount of urine, vision changes, confusion, slurred speech, weakness on one side of the body. A very serious allergic reaction to this drug is rare. However, get medical help right away if you notice any symptoms of a serious allergic reaction, including: rash, itching/swelling (especially of the face/tongue/throat), severe dizziness, trouble breathing. This is not a complete list of possible side effects. If you notice other effects not listed above, contact your doctor or pharmacist. In the [place] - Call your doctor for medical advice about side effects. You may report side effects to FDA at 5-227-USO-9163. In [place] - Call your doctor for medical advice about side effects. You may report side effects to [facility] [place] at 1-982.286.8144.      PRECAUTIONS:  Before taking warfarin, tell your doctor or pharmacist if you are allergic to it; or if you have any other allergies. This product may contain inactive ingredients, which can cause allergic reactions or other problems. Talk to your pharmacist for more details.  Before using this medication, tell your doctor or pharmacist your medical history, especially of: blood disorders (such as anemia, hemophilia), bleeding problems (such as bleeding of the stomach/intestines, bleeding in the brain), blood vessel disorders (such as aneurysms), recent major injury/surgery, liver disease, alcohol use, mental/mood disorders (including memory problems), frequent falls/injuries. It is important that all your doctors and dentists know that you take warfarin. Before having surgery or any medical/dental procedures, tell your doctor or dentist that you are taking this medication and about all the products you use (including prescription drugs, nonprescription drugs, and herbal products). Avoid getting injections into the muscles. If you must have an injection into a muscle (for example, a flu shot), it should be given in the arm. This way, it will be easier to check for bleeding and/or apply pressure bandages. This medication may cause stomach bleeding. Daily use of alcohol while using this medicine will increase your risk for stomach bleeding and may also affect how this medication works. Limit or avoid alcoholic beverages. If you have not been eating well, if you have an illness or infection that causes fever, vomiting, or diarrhea for more than 2 days, or if you start using any antibiotic medications, contact your doctor or pharmacist immediately because these conditions can affect how warfarin works. This medication can cause heavy bleeding. To lower the chance of getting cut, bruised, or injured, use great caution with sharp objects like safety razors and nail cutters. Use an electric razor when shaving and a soft toothbrush when brushing your teeth. Avoid activities such as contact sports. If you fall or injure yourself, especially if you hit your head, call your doctor immediately. Your doctor may need to check you. The Food & Drug Administration has stated that generic warfarin products  "are interchangeable. However, consult your doctor or pharmacist before switching warfarin products. Be careful not to take more than one medication that contains warfarin unless specifically directed by the doctor or health care provider who is monitoring your warfarin treatment. Older adults may be at greater risk for bleeding while using this drug. This medication is not recommended for use during pregnancy because of serious (possibly fatal) harm to an unborn baby. Discuss the use of reliable forms of birth control with your doctor. If you become pregnant or think you may be pregnant, tell your doctor immediately. If you are planning pregnancy, discuss a plan for managing your condition with your doctor before you become pregnant. Your doctor may switch the type of medication you use during pregnancy. Very small amounts of this medication may pass into breast milk but is unlikely to harm a nursing infant. Consult your doctor before breast-feeding.      DRUG INTERACTIONS:  Drug interactions may change how your medications work or increase your risk for serious side effects. This document does not contain all possible drug interactions. Keep a list of all the products you use (including prescription/nonprescription drugs and herbal products) and share it with your doctor and pharmacist. Do not start, stop, or change the dosage of any medicines without your doctor's approval. Warfarin interacts with many prescription, nonprescription, vitamin, and herbal products. This includes medications that are applied to the skin or inside the vagina or rectum. The interactions with warfarin usually result in an increase or decrease in the \"blood-thinning\" (anticoagulant) effect. Your doctor or other health care professional should closely monitor you to prevent serious bleeding or clotting problems. While taking warfarin, it is very important to tell your doctor or pharmacist of any changes in medications, vitamins, or herbal " products that you are taking. Some products that may interact with this drug include: capecitabine, imatinib, mifepristone. Aspirin, aspirin-like drugs (salicylates), and nonsteroidal anti-inflammatory drugs (NSAIDs such as ibuprofen, naproxen, celecoxib) may have effects similar to warfarin. These drugs may increase the risk of bleeding problems if taken during treatment with warfarin. Carefully check all prescription/nonprescription product labels (including drugs applied to the skin such as pain-relieving creams) since the products may contain NSAIDs or salicylates. Talk to your doctor about using a different medication (such as acetaminophen) to treat pain/fever. Low-dose aspirin and related drugs (such as clopidogrel, ticlopidine) should be continued if prescribed by your doctor for specific medical reasons such as heart attack or stroke prevention. Consult your doctor or pharmacist for more details. Many herbal products interact with warfarin. Tell your doctor before taking any herbal products, especially bromelains, coenzyme Q10, cranberry, danshen, dong quai, fenugreek, garlic, ginkgo biloba, ginseng, and Mary's wort, among others. This medication may interfere with a certain laboratory test to measure theophylline levels, possibly causing false test results. Make sure laboratory personnel and all your doctors know you use this drug.      OVERDOSE:  If overdose is suspected, contact a poison control center or emergency room immediately. [place] residents can call the [place] National Poison Hotline at 1-499.267.3417. [place] residents can call a provincial poison control center. Symptoms of overdose may include: bloody/black/tarry stools, pink/dark urine, unusual/prolonged bleeding.      NOTES:  Do not share this medication with others. Laboratory and/or medical tests (such as INR, complete blood count) must be performed periodically to monitor your progress or check for side effects. Consult your doctor for more  details.      MISSED DOSE:  For the best possible benefit, do not miss any doses. If you do miss a dose and remember on the same day, take it as soon as you remember. If you remember on the next day, skip the missed dose and resume your usual dosing schedule. Do not double the dose to catch up because this could increase your risk for bleeding. Keep a record of missed doses to give to your doctor or pharmacist. Contact your doctor or pharmacist if you miss 2 or more doses in a row.      STORAGE:  Store at room temperature away from light and moisture. Do not store in the bathroom. Keep all medications away from children and pets. Do not flush medications down the toilet or pour them into a drain unless instructed to do so. Properly discard this product when it is  or no longer needed. Consult your pharmacist or local waste disposal company for more details about how to safely discard your product.      MEDICAL ALERT:  Your condition and medication can cause complications in a medical emergency. For information about enrolling in MedicAlert, call 1-587.638.9466 ([place]) or 1-925.761.1791 ([place]).      Information last revised 2010 Copyright(c) 2010 [employer]             · Is patient Post Blood Transfusion?  No    Depression / Suicide Risk    As you are discharged from this [facility] facility, it is important to learn how to keep safe from harming yourself.    Recognize the warning signs:  · Abrupt changes in personality, positive or negative- including increase in energy   · Giving away possessions  · Change in eating patterns- significant weight changes-  positive or negative  · Change in sleeping patterns- unable to sleep or sleeping all the time   · Unwillingness or inability to communicate  · Depression  · Unusual sadness, discouragement and loneliness  · Talk of wanting to die  · Neglect of personal appearance   · Rebelliousness- reckless behavior  · Withdrawal from people/activities  they love  · Confusion- inability to concentrate     If you or a loved one observes any of these behaviors or has concerns about self-harm, here's what you can do:  · Talk about it- your feelings and reasons for harming yourself  · Remove any means that you might use to hurt yourself (examples: pills, rope, extension cords, firearm)  · Get professional help from the community (Mental Health, Substance Abuse, psychological counseling)  · Do not be alone:Call your Safe Contact- someone whom you trust who will be there for you.  · Call your local CRISIS HOTLINE 196-1606 or 215-057-6677  · Call your local Children's Mobile Crisis Response Team [place] (764) 731-9898 or www.Monitor110  · Call the toll free National Suicide Prevention Hotlines   · National Suicide Prevention Lifeline 438-596-XGUT (7259)  · [facility] [employer] Network 800-SUICIDE (834-4249)    Cardiac Ablation  Cardiac ablation is a procedure to disable a small amount of heart tissue in very specific places. The heart has many electrical connections. Sometimes these connections are abnormal and can cause the heart to beat very fast or irregularly. By disabling some of the problem areas, heart rhythm can be improved or made normal. Ablation is done for people who:   · Have Leonel-Parkinson-White syndrome.    · Have other fast heart rhythms (tachycardia).    · Have taken medicines for an abnormal heart rhythm (arrhythmia) that resulted in:    ¨ No success.    ¨ Side effects.    · May have a high-risk heartbeat that could result in death.    LET YOUR HEALTH CARE PROVIDER KNOW ABOUT:   · Any allergies you have or any previous reactions you have had to X-ray dye, food (such as seafood), medicine, or tape.    · All medicines you are taking, including vitamins, herbs, eye drops, creams, and over-the-counter medicines.    · Previous problems you or members of your family have had with the use of anesthetics.    · Any blood disorders you have.     · Previous surgeries or procedures (such as a kidney transplant) you have had.    · Medical conditions you have (such as kidney failure).    RISKS AND COMPLICATIONS  Generally, cardiac ablation is a safe procedure. However, problems can occur and include:   · Increased risk of cancer. Depending on how long it takes to do the ablation, the dose of radiation can be high.   · Bruising and bleeding where a thin, flexible tube (catheter) was inserted during the procedure.    · Bleeding into the chest, especially into the sac that surrounds the heart (serious).  · Need for a permanent pacemaker if the normal electrical system is damaged.    · The procedure may not be fully effective, and this may not be recognized for months. Repeat ablation procedures are sometimes required.  BEFORE THE PROCEDURE   · Follow any instructions from your health care provider regarding eating and drinking before the procedure.    · Take your medicines as directed at regular times with water, unless instructed otherwise by your health care provider. If you are taking diabetes medicine, including insulin, ask how you are to take it and if there are any special instructions you should follow. It is common to adjust insulin dosing the day of the ablation.    PROCEDURE  · An ablation is usually performed in a catheterization laboratory with the guidance of fluoroscopy. Fluoroscopy is a type of X-ray that helps your health care provider see images of your heart during the procedure.    · An ablation is a minimally invasive procedure. This means a small cut (incision) is made in either your neck or groin. Your health care provider will decide where to make the incision based on your medical history and physical exam.  · An IV tube will be started before the procedure begins. You will be given an anesthetic or medicine to help you relax (sedative).  · The skin on your neck or groin will be numbed. A needle will be inserted into a large vein in  "your neck or groin and catheters will be threaded to your heart.  · A special dye that shows up on fluoroscopy pictures may be injected through the catheter. The dye helps your health care provider see the area of the heart that needs treatment.  · The catheter has electrodes on the tip. When the area of heart tissue that is causing the arrhythmia is found, the catheter tip will send an electrical current to the area and \"scar\" the tissue.  Three types of energy can be used to ablate the heart tissue:    ¨ Heat (radiofrequency energy).    ¨ Laser energy.    ¨ Extreme cold (cryoablation).    · When the area of the heart has been ablated, the catheter will be taken out. Pressure will be held on the insertion site. This will help the insertion site clot and keep it from bleeding. A bandage will be placed on the insertion site.    AFTER THE PROCEDURE   · After the procedure, you will be taken to a recovery area where your vital signs (blood pressure, heart rate, and breathing) will be monitored. The insertion site will also be monitored for bleeding.    · You will need to lie still for 4-6 hours. This is to ensure you do not bleed from the catheter insertion site.       This information is not intended to replace advice given to you by your health care provider. Make sure you discuss any questions you have with your health care provider.     Document Released: 05/06/2010 Document Revised: 01/08/2016 Document Reviewed: 05/12/2014  [employer] Interactive Patient Education ©2016 [employer]      Atrial Fibrillation  Atrial fibrillation is a condition that causes your heart to beat irregularly. It may also cause your heart to beat faster than normal. Atrial fibrillation can prevent your heart from pumping blood normally. It increases your risk of stroke and heart problems.  HOME CARE  · Take medications as told by your doctor.  · Only take medications that your doctor says are safe. Some medications can make the condition " worse or happen again.  · If blood thinners were prescribed by your doctor, take them exactly as told. Too much can cause bleeding. Too little and you will not have the needed protection against stroke and other problems.  · Perform blood tests at home if told by your doctor.  · Perform blood tests exactly as told by your doctor.  · Do not drink alcohol.  · Do not drink beverages with caffeine such as coffee, soda, and some teas.  · Maintain a healthy weight.  · Do not use diet pills unless your doctor says they are safe. They may make heart problems worse.  · Follow diet instructions as told by your doctor.  · Exercise regularly as told by your doctor.  · Keep all follow-up appointments.  GET HELP IF:  · You notice a change in the speed, rhythm, or strength of your heartbeat.  · You suddenly begin peeing (urinating) more often.  · You get tired more easily when moving or exercising.  GET HELP RIGHT AWAY IF:   · You have chest or belly (abdominal) pain.  · You feel sick to your stomach (nauseous).  · You are short of breath.  · You suddenly have swollen feet and ankles.  · You feel dizzy.  · You face, arms, or legs feel numb or weak.  · There is a change in your vision or speech.  MAKE SURE YOU:   · Understand these instructions.  · Will watch your condition.  · Will get help right away if you are not doing well or get worse.     This information is not intended to replace advice given to you by your health care provider. Make sure you discuss any questions you have with your health care provider.     Document Released: 09/26/2009 Document Revised: 01/08/2016 Document Reviewed: 04/13/2016  [employer] Interactive Patient Education ©2016 [employer]

## 2017-12-14 NOTE — PROGRESS NOTES
Renetta Chauhan Fall Risk Assessment:     Last Known Fall: No falls  Mobility: Use of assistive device/requires assist of two people  Medications: Cardiovascular or central nervous system meds  Mental Status/LOC/Awareness: Awake, alert, and oriented to date, place, and person  Toileting Needs: No needs  Volume/Electrolyte Status: No problems  Communication/Sensory: Visual (Glasses)/hearing deficit  Behavior: Appropriate behavior  Renetta Chauhan Fall Risk Total: 8  Fall Risk Level: LOW RISK    Universal Fall Precautions:  call light/belongings in reach, bed in low position and locked, wheelchairs and assistive devices out of sight, siderails up x 2, use non-slip footwear, adequate lighting, clutter free and spill free environment, educate on level of risk, educate to call for assistance    Fall Risk Level Interventions:   TRIAL (TELE 8, NEURO, MED ROMI 5) Low Fall Risk Interventions  Place yellow fall risk ID band on patient: completed  Provide patient/family education based on risk assessment: completed  Educate patient/family to call staff for assistance when getting out of bed: completed  Place fall precaution signage outside patient door: completed      Patient Specific Interventions:     Medication: review medications with patient and family, assess for medications that can be discontinued or dosage decreased, limit combination of prn medications, provide patients who received diuretics/laxatives frequent toileting and assess need for orthostatic hypotension evaluation  Mental Status/LOC/Awareness: reinforce falls education, check on patient hourly, utilize bed/chair fall alarm, reinforce the use of call light and provide activity  Toileting: provide frquent toileting  Volume/Electrolyte Status: ensure patient remains hydrated, advance diet as tolerated, administer medications as ordered for nausea and vomiting, monitor abnormal lab values and ensure IV fluids are appropriate  Communication/Sensory: update plan of  care on whiteboard and ensure proper positioning when transferrng/ambulating  Behavioral: encourage patient to voice feelings, engage patient in daily activities, administer medication as ordered and instruct/reinforce fall program rationale  Mobility: schedule physical activity throughout the day, provide comfort measures during transport, dangle prior to standing, utilize bed/chair fall alarm, ensure bed is locked and in lowest position, provide appropriate assistive device and instruct patient to exit bed on their strongest side

## 2017-12-14 NOTE — PROGRESS NOTES
2 RN skin check complete. Patient's BL feet are extremely dry, flaking, cracking. Sacrum is pink but blanching. Generalized bruising throughout.

## 2017-12-14 NOTE — PROGRESS NOTES
Pt discharged per orders. Pt verbalizes that they have all personal belongings and prescriptions. IV and tele monitor removed. Pt verbalizes and signs understanding of discharge instructions. Pt left unit via wheelchair without incident

## 2017-12-14 NOTE — RESPIRATORY CARE
COPD EDUCATION by COPD CLINICAL EDUCATOR  12/14/2017 at 7:23 AM by Michelle Bolaños     Patient reviewed by COPD education team. Patient does not qualify for COPD program.

## 2017-12-14 NOTE — DISCHARGE SUMMARY
DISCHARGE DIAGNOSES:  1.  Persistent atrial fibrillation.  2.  Mixed hyperlipidemia.  3.  Hypertension.  4.  Coronary artery disease.  5.  Diastolic dysfunction.  6.  History of lupus.  7.  Eosinophilic disorder of skin.    HISTORY OF PRESENT HOSPITALIZATION:  The patient is a 77-year-old    female who is followed longitudinally in our office by Dr. Lizzy Haywood.    She was referred to Dr. Matthews for evaluation of symptomatic persistent atrial   fibrillation.  The patient was admitted electively for this procedure on   11/13/2017.  Please see procedure report.  Complications of the procedure were   none.   The conclusions of the procedure were as follows:  1.  Persistent atrial fibrillation.  2.  RF ablation, pulmonary vein isolation.  3.  Isolation of the posterior wall and left atrial isthmus ablation.  The   patient has done well through the night; however, she did have about a minute   of sustained SVT at a rate of 170-180 appeared to be supraventricular, was not   irregular.  The patient was symptomatic, did feel the palpitations.  The   patient was given IV digoxin 250 mcg.  She has since then had no recurrence of   the arrhythmia.  She has some oozing from the right groin site appears   quiescent at present.  Both groin sites appears atraumatic.  The right radial   arterial line site is uncomplicated.    PHYSICAL EXAMINATION:  LUNGS:  Clear.  HEART:  Tones. no murmurs, gallops, or rubs are noted.  No dependent edema is   noted.  GENERAL:  She is afebrile this morning at 37.    LABORATORY DATA:  H and H was 13.3 and 39.9.  Electrolytes:  Sodium 137,   potassium 4.7, chloride 104, CO2 21, BUN 12, creatinine 0.52, glucose was   mildly elevated at 123.  INR on admission was 1.84, has come up nicely to   2.14.  Her urine was drawn this morning due to an elevated white count that   has been persistent for at least the last couple of months around 14 and this   morning was at 17.5.  Urine  unremarkable.    EKG this morning demonstrated sinus rhythm with isolated apices.    DISCHARGE MEDICATIONS:  Will include digoxin 125 mcg daily, Colace 1 daily,   omeprazole 20 mg daily for 1 month, albuterol inhaler as directed, and   albuterol nebulized treatments as directed, diclofenac 1% to affected areas as   needed,  digoxin 125 mcg daily, estradiol 2 mg every day, ICAPS 2 capsules   daily, levothyroxine 88 mcg daily, lisinopril 5 mg daily, mag oxide 400 mg   daily, potassium 20 mEq 3 times a day, Zantac 150 mg daily, Senokot as needed,   sertraline 25 mg daily, vitamin D 2000 units daily, warfarin as directed.    She takes 3.75 on Monday and Wednesday 2.5 other days.  She will have an INR   completed in 1 week.  She will also have a CBC redrawn in a week as well.    Urine today is noted was negative.    IMPRESSION:  1.  Persistent atrial fibrillation.  2.  Pulmonary vein isolation with Dr. Bo Matthews on 12/13/2017.  3.  Leukocytosis, etiology unclear.  Will need to be followed as an   outpatient.  Eosinophilic disorder.  4.  Coronary artery disease.  5.  Hypertension.  6.  Hyperlipidemia.    PLAN:  The patient will be discharged home.  She was given home instructions   to report to the emergency room for any of the following: Increasing chest   pain, increasing shortness of breath, focal neurological changes, hemoptysis   or temperature of 101 or greater.  She will also report to the emergency room   for any drainage, redness or concerns regarding the catheterization sites.    The duration of atrial fibrillation of greater than 2 hours, she will call our   office rapid tachycardia persisting.  She will report to the emergency room.       ____________________________________     MEGAN Wilkerson / DELIO    DD:  12/14/2017 10:10:53  DT:  12/14/2017 10:38:33    D#:  [id number]  Job#:  [id number]

## 2017-12-14 NOTE — PROGRESS NOTES
Received report and care assumed. Patient A&Ox 4. Pt. Reports no pain. Work of breathing even and mildly labored, patient on 2L NC and complained of SOB last night while laying supine, does not wear O2 at home. Discussed POC. Patient had sustained SVT event last night, 250 mcg of digoxin given per nightshift RN. WBC count jumped from 14.1-17.5.     Call light within reach and pt. instructed to call when in need of assistance.  Denies any other needs at this time.

## 2017-12-15 ENCOUNTER — APPOINTMENT (OUTPATIENT)
Dept: RADIOLOGY | Facility: MEDICAL CENTER | Age: 77
DRG: 274 | End: 2017-12-15
Attending: EMERGENCY MEDICINE
Payer: MEDICARE

## 2017-12-15 ENCOUNTER — HOSPITAL ENCOUNTER (INPATIENT)
Facility: MEDICAL CENTER | Age: 77
LOS: 7 days | DRG: 274 | End: 2017-12-22
Attending: EMERGENCY MEDICINE | Admitting: HOSPITALIST
Payer: MEDICARE

## 2017-12-15 ENCOUNTER — TELEPHONE (OUTPATIENT)
Dept: CARDIOLOGY | Facility: MEDICAL CENTER | Age: 77
End: 2017-12-15

## 2017-12-15 ENCOUNTER — RESOLUTE PROFESSIONAL BILLING HOSPITAL PROF FEE (OUTPATIENT)
Dept: HOSPITALIST | Facility: MEDICAL CENTER | Age: 77
End: 2017-12-15
Payer: MEDICARE

## 2017-12-15 DIAGNOSIS — I48.91 ATRIAL FIBRILLATION WITH RAPID VENTRICULAR RESPONSE (HCC): ICD-10-CM

## 2017-12-15 DIAGNOSIS — R13.13 PHARYNGEAL DYSPHAGIA: ICD-10-CM

## 2017-12-15 PROBLEM — R13.10 DYSPHAGIA: Status: ACTIVE | Noted: 2017-12-15

## 2017-12-15 LAB
ALBUMIN SERPL BCP-MCNC: 3.6 G/DL (ref 3.2–4.9)
ALBUMIN/GLOB SERPL: 1.3 G/DL
ALP SERPL-CCNC: 63 U/L (ref 30–99)
ALT SERPL-CCNC: 12 U/L (ref 2–50)
ANION GAP SERPL CALC-SCNC: 11 MMOL/L (ref 0–11.9)
APTT PPP: 31.8 SEC (ref 24.7–36)
AST SERPL-CCNC: 21 U/L (ref 12–45)
BILIRUB SERPL-MCNC: 0.6 MG/DL (ref 0.1–1.5)
BUN SERPL-MCNC: 11 MG/DL (ref 8–22)
CALCIUM SERPL-MCNC: 8.8 MG/DL (ref 8.5–10.5)
CHLORIDE SERPL-SCNC: 101 MMOL/L (ref 96–112)
CO2 SERPL-SCNC: 23 MMOL/L (ref 20–33)
CREAT SERPL-MCNC: 0.65 MG/DL (ref 0.5–1.4)
DIGOXIN SERPL-MCNC: 1.2 NG/ML (ref 0.8–2)
EKG IMPRESSION: NORMAL
ERYTHROCYTE [DISTWIDTH] IN BLOOD BY AUTOMATED COUNT: 50.9 FL (ref 35.9–50)
EST. AVERAGE GLUCOSE BLD GHB EST-MCNC: 128 MG/DL
GFR SERPL CREATININE-BSD FRML MDRD: >60 ML/MIN/1.73 M 2
GLOBULIN SER CALC-MCNC: 2.8 G/DL (ref 1.9–3.5)
GLUCOSE SERPL-MCNC: 141 MG/DL (ref 65–99)
HBA1C MFR BLD: 6.1 % (ref 0–5.6)
HCT VFR BLD AUTO: 40.2 % (ref 37–47)
HGB BLD-MCNC: 13.4 G/DL (ref 12–16)
INR PPP: 2.6 (ref 0.87–1.13)
LIPASE SERPL-CCNC: 13 U/L (ref 11–82)
MAGNESIUM SERPL-MCNC: 1.9 MG/DL (ref 1.5–2.5)
MCH RBC QN AUTO: 32.7 PG (ref 27–33)
MCHC RBC AUTO-ENTMCNC: 33.3 G/DL (ref 33.6–35)
MCV RBC AUTO: 98 FL (ref 81.4–97.8)
PLATELET # BLD AUTO: 196 K/UL (ref 164–446)
PMV BLD AUTO: 9.8 FL (ref 9–12.9)
POTASSIUM SERPL-SCNC: 3.9 MMOL/L (ref 3.6–5.5)
PROT SERPL-MCNC: 6.4 G/DL (ref 6–8.2)
PROTHROMBIN TIME: 27.5 SEC (ref 12–14.6)
RBC # BLD AUTO: 4.1 M/UL (ref 4.2–5.4)
SODIUM SERPL-SCNC: 135 MMOL/L (ref 135–145)
TROPONIN I SERPL-MCNC: 0.33 NG/ML (ref 0–0.04)
WBC # BLD AUTO: 10.5 K/UL (ref 4.8–10.8)

## 2017-12-15 PROCEDURE — 71010 DX-CHEST-PORTABLE (1 VIEW): CPT

## 2017-12-15 PROCEDURE — 304561 HCHG STAT O2

## 2017-12-15 PROCEDURE — 700105 HCHG RX REV CODE 258: Performed by: HOSPITALIST

## 2017-12-15 PROCEDURE — 93005 ELECTROCARDIOGRAM TRACING: CPT | Performed by: EMERGENCY MEDICINE

## 2017-12-15 PROCEDURE — 80053 COMPREHEN METABOLIC PANEL: CPT

## 2017-12-15 PROCEDURE — 84484 ASSAY OF TROPONIN QUANT: CPT

## 2017-12-15 PROCEDURE — 85610 PROTHROMBIN TIME: CPT

## 2017-12-15 PROCEDURE — 99223 1ST HOSP IP/OBS HIGH 75: CPT | Mod: AI | Performed by: HOSPITALIST

## 2017-12-15 PROCEDURE — 85027 COMPLETE CBC AUTOMATED: CPT

## 2017-12-15 PROCEDURE — 770020 HCHG ROOM/CARE - TELE (206)

## 2017-12-15 PROCEDURE — 74210 X-RAY XM PHRNX&/CRV ESOPH C+: CPT

## 2017-12-15 PROCEDURE — 83735 ASSAY OF MAGNESIUM: CPT

## 2017-12-15 PROCEDURE — 36415 COLL VENOUS BLD VENIPUNCTURE: CPT

## 2017-12-15 PROCEDURE — 83036 HEMOGLOBIN GLYCOSYLATED A1C: CPT

## 2017-12-15 PROCEDURE — 700117 HCHG RX CONTRAST REV CODE 255: Performed by: EMERGENCY MEDICINE

## 2017-12-15 PROCEDURE — 85730 THROMBOPLASTIN TIME PARTIAL: CPT

## 2017-12-15 PROCEDURE — 83690 ASSAY OF LIPASE: CPT

## 2017-12-15 PROCEDURE — 80162 ASSAY OF DIGOXIN TOTAL: CPT

## 2017-12-15 PROCEDURE — 99285 EMERGENCY DEPT VISIT HI MDM: CPT

## 2017-12-15 RX ORDER — ACETAMINOPHEN 325 MG/1
650 TABLET ORAL EVERY 6 HOURS PRN
Status: DISCONTINUED | OUTPATIENT
Start: 2017-12-15 | End: 2017-12-22 | Stop reason: HOSPADM

## 2017-12-15 RX ORDER — SODIUM CHLORIDE 9 MG/ML
INJECTION, SOLUTION INTRAVENOUS ONCE
Status: COMPLETED | OUTPATIENT
Start: 2017-12-15 | End: 2017-12-15

## 2017-12-15 RX ORDER — VERAPAMIL HYDROCHLORIDE 120 MG/1
120 TABLET, FILM COATED ORAL 3 TIMES DAILY
Status: ON HOLD | COMMUNITY
End: 2017-12-22

## 2017-12-15 RX ORDER — BISACODYL 10 MG
10 SUPPOSITORY, RECTAL RECTAL
Status: DISCONTINUED | OUTPATIENT
Start: 2017-12-15 | End: 2017-12-22 | Stop reason: HOSPADM

## 2017-12-15 RX ORDER — ONDANSETRON 4 MG/1
4 TABLET, ORALLY DISINTEGRATING ORAL EVERY 4 HOURS PRN
Status: DISCONTINUED | OUTPATIENT
Start: 2017-12-15 | End: 2017-12-18

## 2017-12-15 RX ORDER — ONDANSETRON 2 MG/ML
4 INJECTION INTRAMUSCULAR; INTRAVENOUS EVERY 4 HOURS PRN
Status: DISCONTINUED | OUTPATIENT
Start: 2017-12-15 | End: 2017-12-18

## 2017-12-15 RX ORDER — POLYETHYLENE GLYCOL 3350 17 G/17G
1 POWDER, FOR SOLUTION ORAL
Status: DISCONTINUED | OUTPATIENT
Start: 2017-12-15 | End: 2017-12-22 | Stop reason: HOSPADM

## 2017-12-15 RX ORDER — AMOXICILLIN 250 MG
2 CAPSULE ORAL 2 TIMES DAILY
Status: DISCONTINUED | OUTPATIENT
Start: 2017-12-15 | End: 2017-12-22 | Stop reason: HOSPADM

## 2017-12-15 RX ADMIN — SODIUM CHLORIDE: 9 INJECTION, SOLUTION INTRAVENOUS at 21:37

## 2017-12-15 RX ADMIN — IOHEXOL 100 ML: 300 INJECTION, SOLUTION INTRAVENOUS at 15:00

## 2017-12-15 ASSESSMENT — COPD QUESTIONNAIRES
DURING THE PAST 4 WEEKS HOW MUCH DID YOU FEEL SHORT OF BREATH: NONE/LITTLE OF THE TIME
COPD SCREENING SCORE: 3
DO YOU EVER COUGH UP ANY MUCUS OR PHLEGM?: NO/ONLY WITH OCCASIONAL COLDS OR INFECTIONS
HAVE YOU SMOKED AT LEAST 100 CIGARETTES IN YOUR ENTIRE LIFE: NO/DON'T KNOW

## 2017-12-15 ASSESSMENT — PATIENT HEALTH QUESTIONNAIRE - PHQ9
SUM OF ALL RESPONSES TO PHQ QUESTIONS 1-9: 0
SUM OF ALL RESPONSES TO PHQ9 QUESTIONS 1 AND 2: 0
1. LITTLE INTEREST OR PLEASURE IN DOING THINGS: NOT AT ALL
2. FEELING DOWN, DEPRESSED, IRRITABLE, OR HOPELESS: NOT AT ALL

## 2017-12-15 ASSESSMENT — LIFESTYLE VARIABLES
ALCOHOL_USE: NO
DO YOU DRINK ALCOHOL: NO
EVER_SMOKED: NEVER
EVER_SMOKED: NEVER

## 2017-12-15 ASSESSMENT — ENCOUNTER SYMPTOMS
BRUISES/BLEEDS EASILY: 0
SPUTUM PRODUCTION: 0
WEIGHT LOSS: 0
MYALGIAS: 0
DEPRESSION: 0
HEARTBURN: 0
DIZZINESS: 0
ORTHOPNEA: 0
DOUBLE VISION: 0
FEVER: 0
HEMOPTYSIS: 0
SHORTNESS OF BREATH: 0
NECK PAIN: 0
CHILLS: 0
PHOTOPHOBIA: 0
CLAUDICATION: 0

## 2017-12-15 ASSESSMENT — PAIN SCALES - GENERAL
PAINLEVEL_OUTOF10: 0
PAINLEVEL_OUTOF10: 0

## 2017-12-15 NOTE — TELEPHONE ENCOUNTER
----- Message from Mariah Ríos sent at 12/15/2017  9:52 AM PST -----  Regarding: pt unable to swallow pills  Contact: [phone]  NIKOLE/mitchell    Pt calling asking for PB.  She saw PB upon leaving hospital yesterday after ablation.  Today pt is calling to ask for PB's advice, she is unable to swallow her pills, feels like she is choking. Please call  or . Pt is asking for call asap.

## 2017-12-15 NOTE — ED NOTES
"Chief Complaint   Patient presents with   • Difficulty Swallowing     /92   Pulse (!) 146   Temp 37.1 °C (98.8 °F)   Resp 16   Ht 1.626 m (5' 4\")   Wt 86 kg (189 lb 9.5 oz)   LMP 01/01/1993   SpO2 91%   BMI 32.54 kg/m²     BIBA from home for difficulty swallowing. No facial droop, clear speech, equal  bilaterally.  Pt had ablation Wednesday for afib. Room air sp02 86%, placed on 3 L NC. Connected to monitor. EKG ordered    Chart up for eval.    "

## 2017-12-15 NOTE — ED NOTES
Med rec complete per patient with medication list  Allergies reviewed    Patient is unaware of most her medications and when she takes them. She stated she doesn't feel well and can't remember.  Asked questions about her Verapamil and Coumadin but she stated she doesn't know

## 2017-12-15 NOTE — TELEPHONE ENCOUNTER
Pt. Had PVI on 12-13 with Dr. Matthews. Today she is unable to swallow pills, solids; she can swallow small sips of liquids with difficulty. Otherwise, she feels fine. Pulse is stable at 105-108. She has sore throat.   Per Mana: pt. Should go to ER.  advised. [facility]

## 2017-12-15 NOTE — ED PROVIDER NOTES
ED Provider Note    Scribed for Oj Avalos M.D. by Cleo Ann. 12/15/2017, 1:15 PM.    Primary care provider: Kishore Price M.D.  Means of arrival: EMS  History obtained from: patient  History limited by: none    CHIEF COMPLAINT  Chief Complaint   Patient presents with   • Difficulty Swallowing     HPI  Donte Magaña is a 77 y.o. female who presents to the Emergency Department by EMS for difficulty swallowing onset suddenly this morning.  She notes that she is unable to swallow liquids or her medications. She states that she immediately starts to gag and vomit.  The patient was able to eat dinner normally and drink water last night prior to going to bed.  The patient had a cardiac ablation performed yesterday by Dr. Matthews,Cardiologist and notes that she was intubated.  The patient denies any fevers, chills, chest pain, or shortness of breath.     REVIEW OF SYSTEMS  Review of Systems   Constitutional: Negative for chills and fever.   HENT:        +difficulty swallowing   Respiratory: Negative for shortness of breath.    Cardiovascular: Negative for chest pain.   All other systems reviewed and are negative.     C    PAST MEDICAL HISTORY   has a past medical history of A-fib (CMS-[facility]); Anesthesia; Anticoagulant long-term use (1/12/2012); Arthritis; Asthma; Atrial fibrillation (CMS-[facility]); Backpain; Breath shortness; Bronchitis (Nov, 2013); CAD (coronary artery disease); Depression; Glaucoma (5/3/2011); Hematoma complicating a procedure (11/3/2012); High cholesterol; Hypertension; Hypothyroid; Lupus; Macular degeneration; Menopause (1/12/2012); Mitral regurgitation (10/30/2012); Obesity (1/12/2012); Pneumonia (feb,2013); Pulmonary hypertension (10/30/2012); PVC's (premature ventricular contractions) (1/12/2012); Senile nuclear sclerosis; Spinal stenosis of lumbar region at multiple levels; Unspecified cataract; and Unspecified urinary incontinence.    SURGICAL HISTORY   has a past  surgical history that includes tonsillectomy and adenoidectomy; recovery (11/30/2010); colonoscopy (2008); abdominal hysterectomy total (April 15,1975); other; cataract phaco with iol (4/21/2015); cataract phaco with iol (Right, 5/5/2015); open reduction; knee arthroplasty total (Left, 5/28/2015); and knee arthroplasty total (Right, 6/23/2016).    SOCIAL HISTORY  Social History   Substance Use Topics   • Smoking status: Never Smoker   • Smokeless tobacco: Never Used   • Alcohol use No      History   Drug Use No       FAMILY HISTORY  Family History   Problem Relation Age of Onset   • Stroke Mother    • Diabetes Father    • Stroke Sister    • Heart Disease Brother    • Stroke Sister    • GI Daughter      Crohn's Disease   • Heart Disease Daughter      CHF   • Other Daughter      Chronic Pain--Lymphedema   • Cancer Paternal Aunt      CURRENT MEDICATIONS  Home Medications     Reviewed by Maxine Menard R.N. (Registered Nurse) on 12/15/17 at 1307  Med List Status: <None>   Medication Last Dose Status   acetaminophen (TYLENOL) 500 MG TABS 12/12/2017 Active   albuterol (PROVENTIL) 2.5mg/3ml Nebu Soln solution for nebulization unknown Active   albuterol 108 (90 BASE) MCG/ACT Aero Soln inhalation aerosol unknown Active   Diclofenac Sodium 1 % Gel unknown Active   digoxin (LANOXIN) 125 MCG Tab 12/13/2017 Active   docusate sodium (COLACE) 100 MG Cap 12/12/2017 Active   estradiol (ESTRACE) 2 MG Tab 12/12/2017 Active   furosemide (LASIX) 40 MG Tab 12/13/2017 Active   furosemide (LASIX) 40 MG Tab  Active   levothyroxine (SYNTHROID) 88 MCG Tab 12/13/2017 Active   lisinopril (PRINIVIL) 5 MG Tab 12/12/2017 Active   magnesium oxide (MAG-OX) 400 MG Tab 12/12/2017 Active   Multiple Vitamins-Minerals (ICAPS AREDS 2) Cap 12/12/2017 Active   omeprazole (PRILOSEC) 20 MG delayed-release capsule  Active   potassium chloride SA (KDUR) 20 MEQ Tab CR 12/12/2017 Active   ranitidine (ZANTAC) 150 MG Tab 12/12/2017 Active   sennosides-docusate  "sodium (SENOKOT-S) 8.6-50 MG tablet 12/12/2017 Active   sertraline (ZOLOFT) 25 MG tablet 12/12/2017 Active   verapamil (ISOPTIN) 120 MG Tab 12/13/2017 Active   verapamil (ISOPTIN) 120 MG Tab  Active   vitamin D (CHOLECALCIFEROL) 1000 UNIT TABS 12/12/2017 Active   warfarin (COUMADIN) 2.5 MG Tab 12/12/2017 Active              ALLERGIES  Allergies   Allergen Reactions   • Amiodarone Hives     Throat and tongue itching   • Bactrim Shortness of Breath   • Claritin-D [Loratadine-Pseudoephedrine] Palpitations   • Clotrimazole      Stinging / burning on chest   • Metoprolol      Causes throat swelling   • Morphine      Hallucinations   • Qvar [Beclomethasone Dipropionate]      Pressure on heart     • Vibramycin Shortness of Breath   • Atorvastatin Calcium-Polysorbate 80      Muscle aches     • Augmentin      Unknown reaction   • Cipro Xr Swelling   • Diltiazem      rash   • Flecainide      dizziness   • Keflex Unspecified     Pt states \"Unsure\".   • Mucinex      GI Distress     • Tramadol      crying   • Tape Rash     Paper tape okay     PHYSICAL EXAM  VITAL SIGNS: /92   Pulse (!) 146   Temp 37.1 °C (98.8 °F)   Resp 16   Ht 1.626 m (5' 4\")   Wt 86 kg (189 lb 9.5 oz)   LMP 01/01/1993   SpO2 91%   BMI 32.54 kg/m²   Vitals reviewed.  Constitutional: Well developed, Well nourished, No acute distress, Non-toxic appearance.   HENT: Normocephalic, Atraumatic, Bilateral external ears normal, dry mucous membranes, No oral exudates, Nose normal. Throat is ecchymotic and bruised  Eyes: PERRL, EOMI, Conjunctiva normal, No discharge.   Neck: Normal range of motion, No tenderness, Supple, No stridor.   Cardiovascular: tachycardic and irregular rhythm, No murmurs, No rubs, No gallops.   Thorax & Lungs: Normal breath sounds, No respiratory distress, No wheezing, No chest tenderness.   Abdomen: Bowel sounds normal, Soft, No tenderness  Skin: Warm, Dry, No erythema, No rash.   Back: No tenderness, No CVA tenderness. "   Musculoskeletal: Good range of motion in all major joints. Mild lower extremity edema. No tenderness to palpation or major deformities noted.   Neurologic: Alert, Normal motor function, Normal sensory function, No focal deficits noted.   Psychiatric: Affect normal    LABS  Results for orders placed or performed during the hospital encounter of 12/15/17   CBC WITHOUT DIFFERENTIAL   Result Value Ref Range    WBC 10.5 4.8 - 10.8 K/uL    RBC 4.10 (L) 4.20 - 5.40 M/uL    Hemoglobin 13.4 12.0 - 16.0 g/dL    Hematocrit 40.2 37.0 - 47.0 %    MCV 98.0 (H) 81.4 - 97.8 fL    MCH 32.7 27.0 - 33.0 pg    MCHC 33.3 (L) 33.6 - 35.0 g/dL    RDW 50.9 (H) 35.9 - 50.0 fL    Platelet Count 196 164 - 446 K/uL    MPV 9.8 9.0 - 12.9 fL   COMP METABOLIC PANEL   Result Value Ref Range    Sodium 135 135 - 145 mmol/L    Potassium 3.9 3.6 - 5.5 mmol/L    Chloride 101 96 - 112 mmol/L    Co2 23 20 - 33 mmol/L    Anion Gap 11.0 0.0 - 11.9    Glucose 141 (H) 65 - 99 mg/dL    Bun 11 8 - 22 mg/dL    Creatinine 0.65 0.50 - 1.40 mg/dL    Calcium 8.8 8.5 - 10.5 mg/dL    AST(SGOT) 21 12 - 45 U/L    ALT(SGPT) 12 2 - 50 U/L    Alkaline Phosphatase 63 30 - 99 U/L    Total Bilirubin 0.6 0.1 - 1.5 mg/dL    Albumin 3.6 3.2 - 4.9 g/dL    Total Protein 6.4 6.0 - 8.2 g/dL    Globulin 2.8 1.9 - 3.5 g/dL    A-G Ratio 1.3 g/dL   LIPASE   Result Value Ref Range    Lipase 13 11 - 82 U/L   TROPONIN   Result Value Ref Range    Troponin I 0.33 (H) 0.00 - 0.04 ng/mL   APTT   Result Value Ref Range    APTT 31.8 24.7 - 36.0 sec   PROTHROMBIN TIME (INR)   Result Value Ref Range    PT 27.5 (H) 12.0 - 14.6 sec    INR 2.60 (H) 0.87 - 1.13   DIGOXIN   Result Value Ref Range    Digoxin 1.2 0.8 - 2.0 ng/mL   ESTIMATED GFR   Result Value Ref Range    GFR If African American >60 >60 mL/min/1.73 m 2    GFR If Non African American >60 >60 mL/min/1.73 m 2   EKG (ER)   Result Value Ref Range    Report       [facility] Emergency Dept.    Test Date:  2017-12-15  Pt  Name:    STPEHANIE DAVENPORT                 Department: ER  MRN:        [id number]                      Room:       BL 15  Gender:     F                            Technician: [id number]  :        [id number]                   Requested By:ER TRIAGE PROTOCOL  Order #:    [id number]                    Reading MD:    Measurements  Intervals                                Axis  Rate:       149                          P:  MA:                                      QRS:        -44  QRSD:       72                           T:          106  QT:         288  QTc:        454    Interpretive Statements  ATRIAL FIBRILLATION WITH RAPID V-RATE  VENTRICULAR PREMATURE COMPLEX  LEFT AXIS DEVIATION  BORDERLINE LOW VOLTAGE IN FRONTAL LEADS  REPOLARIZATION ABNORMALITY, PROB RATE RELATED  BASELINE WANDER IN LEAD(S) I,aVR  Compared to ECG 2017 05:05:41  Ventricular premature complex(es) now present  Early repolarization now present  Sinus  tachycardia no longer present  Atrial premature complex(es) no longer present  ST (T wave) deviation no longer present     All labs reviewed by me.    EKG Interpretation  Interpreted by me    Rhythm: Rapid a fib   Rate: 149  Axis: normal  Ectopy: none  Conduction: normal  ST Segments: no acute change  T Waves: no acute change  Q Waves: none  Clinical Impression: Normal EKG without acute changes      RADIOLOGY  DX-ESOPH. FLUORO (BARIUM SWALLOW)   Final Result      Moderate esophageal dysmotility. No obstructing lesions and no extraluminal leakage of contrast identified.      DX-CHEST-PORTABLE (1 VIEW)   Final Result      No acute cardiopulmonary disease.        The radiologist's interpretation of all radiological studies have been reviewed by me.    COURSE & MEDICAL DECISION MAKING  Pertinent Labs & Imaging studies reviewed. (See chart for details)    Obtained and reviewed past medical records     1:15 PM Patient seen and examined at bedside. The patient presents with difficulty swallowing, and the  differential diagnosis includes but is not limited to , Salvageable injury, esophageal stricture, dysmotility or foreign body.. Ordered for chest xray, CBC with differential, CMP, lipase, troponin, APTT, INR, digoxin,estimated GFR, EKG to evaluate. I asked the nurse to give the patient water to see how she reacts to swallowing.         Consulted with Cardiology Dr. Ferreira, she will consult on the patient's case.   3:22 PM Order xray esophagus fluoro barium swallow    3:25 PM Paged Hospitalist         3:41 PM  Spoke with Dr. Curiel, Hospitalist, about the patient's condition. They will admit the patient, care is transferred at this time.    3:44 PM  Explained to the patient that she will be admitted for further monitoring and care, she verbalized understanding.  Patient be admitted for further workup and treatment.  Care is transferred to the hospitalist service.    DISPOSITION:  Patient will be admitted to Dr. Curiel in guarded condition.    FINAL IMPRESSION  1. Pharyngeal dysphagia    2. Atrial fibrillation with rapid ventricular response (CMS-HCC)          Cleo HERNANDEZ (Scribe), am scribing for, and in the presence of, Oj Avalos M.D..    Electronically signed by: Cleo Ann (Scribe), 12/15/2017    Oj HRENANDEZ M.D. personally performed the services described in this documentation, as scribed by Cleo Ann in my presence, and it is both accurate and complete.    The note accurately reflects work and decisions made by me.  Oj Avalos  12/15/2017  4:42 PM

## 2017-12-16 PROBLEM — E05.90 HYPERTHYROIDISM: Status: ACTIVE | Noted: 2017-12-16

## 2017-12-16 LAB
ALBUMIN SERPL BCP-MCNC: 3.2 G/DL (ref 3.2–4.9)
ALBUMIN/GLOB SERPL: 1.2 G/DL
ALP SERPL-CCNC: 55 U/L (ref 30–99)
ALT SERPL-CCNC: 11 U/L (ref 2–50)
ANION GAP SERPL CALC-SCNC: 7 MMOL/L (ref 0–11.9)
AST SERPL-CCNC: 19 U/L (ref 12–45)
BILIRUB SERPL-MCNC: 0.6 MG/DL (ref 0.1–1.5)
BUN SERPL-MCNC: 10 MG/DL (ref 8–22)
CALCIUM SERPL-MCNC: 8.6 MG/DL (ref 8.5–10.5)
CHLORIDE SERPL-SCNC: 101 MMOL/L (ref 96–112)
CHOLEST SERPL-MCNC: 140 MG/DL (ref 100–199)
CO2 SERPL-SCNC: 26 MMOL/L (ref 20–33)
CREAT SERPL-MCNC: 0.51 MG/DL (ref 0.5–1.4)
ERYTHROCYTE [DISTWIDTH] IN BLOOD BY AUTOMATED COUNT: 50.8 FL (ref 35.9–50)
GFR SERPL CREATININE-BSD FRML MDRD: >60 ML/MIN/1.73 M 2
GLOBULIN SER CALC-MCNC: 2.7 G/DL (ref 1.9–3.5)
GLUCOSE SERPL-MCNC: 145 MG/DL (ref 65–99)
HCT VFR BLD AUTO: 38.9 % (ref 37–47)
HDLC SERPL-MCNC: 45 MG/DL
HGB BLD-MCNC: 12.8 G/DL (ref 12–16)
LDLC SERPL CALC-MCNC: 67 MG/DL
MCH RBC QN AUTO: 32.2 PG (ref 27–33)
MCHC RBC AUTO-ENTMCNC: 32.9 G/DL (ref 33.6–35)
MCV RBC AUTO: 97.7 FL (ref 81.4–97.8)
PLATELET # BLD AUTO: 227 K/UL (ref 164–446)
PMV BLD AUTO: 9.8 FL (ref 9–12.9)
POTASSIUM SERPL-SCNC: 3.7 MMOL/L (ref 3.6–5.5)
PROT SERPL-MCNC: 5.9 G/DL (ref 6–8.2)
RBC # BLD AUTO: 3.98 M/UL (ref 4.2–5.4)
SODIUM SERPL-SCNC: 134 MMOL/L (ref 135–145)
T4 FREE SERPL-MCNC: 1.78 NG/DL (ref 0.53–1.43)
TRIGL SERPL-MCNC: 139 MG/DL (ref 0–149)
TSH SERPL DL<=0.005 MIU/L-ACNC: 0.03 UIU/ML (ref 0.38–5.33)
WBC # BLD AUTO: 10 K/UL (ref 4.8–10.8)

## 2017-12-16 PROCEDURE — A9270 NON-COVERED ITEM OR SERVICE: HCPCS | Performed by: HOSPITALIST

## 2017-12-16 PROCEDURE — 92610 EVALUATE SWALLOWING FUNCTION: CPT

## 2017-12-16 PROCEDURE — 84439 ASSAY OF FREE THYROXINE: CPT

## 2017-12-16 PROCEDURE — 700105 HCHG RX REV CODE 258: Performed by: INTERNAL MEDICINE

## 2017-12-16 PROCEDURE — 700111 HCHG RX REV CODE 636 W/ 250 OVERRIDE (IP): Performed by: INTERNAL MEDICINE

## 2017-12-16 PROCEDURE — 80053 COMPREHEN METABOLIC PANEL: CPT

## 2017-12-16 PROCEDURE — G8997 SWALLOW GOAL STATUS: HCPCS | Mod: CI

## 2017-12-16 PROCEDURE — 80061 LIPID PANEL: CPT

## 2017-12-16 PROCEDURE — A9270 NON-COVERED ITEM OR SERVICE: HCPCS | Performed by: NURSE PRACTITIONER

## 2017-12-16 PROCEDURE — 36415 COLL VENOUS BLD VENIPUNCTURE: CPT

## 2017-12-16 PROCEDURE — 85027 COMPLETE CBC AUTOMATED: CPT

## 2017-12-16 PROCEDURE — 700111 HCHG RX REV CODE 636 W/ 250 OVERRIDE (IP): Performed by: HOSPITALIST

## 2017-12-16 PROCEDURE — 84443 ASSAY THYROID STIM HORMONE: CPT

## 2017-12-16 PROCEDURE — 700102 HCHG RX REV CODE 250 W/ 637 OVERRIDE(OP): Performed by: HOSPITALIST

## 2017-12-16 PROCEDURE — 770020 HCHG ROOM/CARE - TELE (206)

## 2017-12-16 PROCEDURE — G8996 SWALLOW CURRENT STATUS: HCPCS | Mod: CK

## 2017-12-16 PROCEDURE — 700105 HCHG RX REV CODE 258: Performed by: HOSPITALIST

## 2017-12-16 PROCEDURE — 99233 SBSQ HOSP IP/OBS HIGH 50: CPT | Performed by: HOSPITALIST

## 2017-12-16 PROCEDURE — 700102 HCHG RX REV CODE 250 W/ 637 OVERRIDE(OP): Performed by: NURSE PRACTITIONER

## 2017-12-16 RX ORDER — DIGOXIN 0.25 MG/ML
250 INJECTION INTRAMUSCULAR; INTRAVENOUS ONCE
Status: COMPLETED | OUTPATIENT
Start: 2017-12-16 | End: 2017-12-16

## 2017-12-16 RX ORDER — VERAPAMIL HYDROCHLORIDE 120 MG/1
180 TABLET, FILM COATED ORAL EVERY 8 HOURS
Status: DISCONTINUED | OUTPATIENT
Start: 2017-12-16 | End: 2017-12-18

## 2017-12-16 RX ADMIN — VERAPAMIL HYDROCHLORIDE 7.5 MCG/KG/MIN: 2.5 INJECTION, SOLUTION INTRAVENOUS at 10:12

## 2017-12-16 RX ADMIN — VERAPAMIL HYDROCHLORIDE 7.5 MCG/KG/MIN: 2.5 INJECTION, SOLUTION INTRAVENOUS at 05:49

## 2017-12-16 RX ADMIN — DIGOXIN 250 MCG: 0.25 INJECTION INTRAMUSCULAR; INTRAVENOUS at 04:04

## 2017-12-16 RX ADMIN — VERAPAMIL HYDROCHLORIDE 180 MG: 120 TABLET, FILM COATED ORAL at 22:35

## 2017-12-16 RX ADMIN — STANDARDIZED SENNA CONCENTRATE AND DOCUSATE SODIUM 2 TABLET: 8.6; 5 TABLET, FILM COATED ORAL at 22:38

## 2017-12-16 RX ADMIN — VERAPAMIL HYDROCHLORIDE 5 MCG/KG/MIN: 2.5 INJECTION, SOLUTION INTRAVENOUS at 00:21

## 2017-12-16 RX ADMIN — VERAPAMIL HYDROCHLORIDE 180 MG: 120 TABLET, FILM COATED ORAL at 15:27

## 2017-12-16 ASSESSMENT — ENCOUNTER SYMPTOMS
HEARTBURN: 0
NAUSEA: 0
MYALGIAS: 0
CONSTIPATION: 0
FEVER: 0
CHILLS: 0
EYE PAIN: 0
DEPRESSION: 0
BACK PAIN: 0
PND: 0
SHORTNESS OF BREATH: 0
TREMORS: 0
COUGH: 0
BLOOD IN STOOL: 0
HEADACHES: 0
MEMORY LOSS: 0
SPEECH CHANGE: 0
SORE THROAT: 0
DIZZINESS: 0
BLURRED VISION: 0
ORTHOPNEA: 0
PALPITATIONS: 0
PHOTOPHOBIA: 0
VOMITING: 0
WEAKNESS: 1
DOUBLE VISION: 0
STRIDOR: 0
CLAUDICATION: 0
NERVOUS/ANXIOUS: 0
TINGLING: 0
SPUTUM PRODUCTION: 0
HEMOPTYSIS: 0
NECK PAIN: 0
SENSORY CHANGE: 0

## 2017-12-16 ASSESSMENT — COPD QUESTIONNAIRES
DURING THE PAST 4 WEEKS HOW MUCH DID YOU FEEL SHORT OF BREATH: NONE/LITTLE OF THE TIME
HAVE YOU SMOKED AT LEAST 100 CIGARETTES IN YOUR ENTIRE LIFE: NO/DON'T KNOW
COPD SCREENING SCORE: 3
COPD SCREENING SCORE: 3
DURING THE PAST 4 WEEKS HOW MUCH DID YOU FEEL SHORT OF BREATH: NONE/LITTLE OF THE TIME
DO YOU EVER COUGH UP ANY MUCUS OR PHLEGM?: NO/ONLY WITH OCCASIONAL COLDS OR INFECTIONS
HAVE YOU SMOKED AT LEAST 100 CIGARETTES IN YOUR ENTIRE LIFE: NO/DON'T KNOW
DO YOU EVER COUGH UP ANY MUCUS OR PHLEGM?: NO/ONLY WITH OCCASIONAL COLDS OR INFECTIONS

## 2017-12-16 ASSESSMENT — PATIENT HEALTH QUESTIONNAIRE - PHQ9
2. FEELING DOWN, DEPRESSED, IRRITABLE, OR HOPELESS: NOT AT ALL
2. FEELING DOWN, DEPRESSED, IRRITABLE, OR HOPELESS: NOT AT ALL
SUM OF ALL RESPONSES TO PHQ QUESTIONS 1-9: 0
1. LITTLE INTEREST OR PLEASURE IN DOING THINGS: NOT AT ALL
SUM OF ALL RESPONSES TO PHQ9 QUESTIONS 1 AND 2: 0
SUM OF ALL RESPONSES TO PHQ QUESTIONS 1-9: 0
SUM OF ALL RESPONSES TO PHQ9 QUESTIONS 1 AND 2: 0
1. LITTLE INTEREST OR PLEASURE IN DOING THINGS: NOT AT ALL

## 2017-12-16 ASSESSMENT — PAIN SCALES - GENERAL
PAINLEVEL_OUTOF10: 0

## 2017-12-16 ASSESSMENT — LIFESTYLE VARIABLES
DO YOU DRINK ALCOHOL: NO
DO YOU DRINK ALCOHOL: NO

## 2017-12-16 NOTE — H&P
Hospital Medicine History and Physical    Date of Service  12/15/2017    Chief Complaint  Chief Complaint   Patient presents with   • Difficulty Swallowing       History of Presenting Illness  77 y.o. female who presented 12/15/2017 with afib had ablation on Monday patient is coming because difficulty swallowing since this morning, patient could not take her medications today since she was gaging, patient stated that she was intubated for procedure on Monday but she was doing ok until today she had dinner and drinking fluids last night, patient was also found to be on afib with rvr her hr in the 140's cardiologist was consulted patient has multiple allergies to medication she was started on verapamil drip, patient denies chest pain, no sob, no palpitation, no dizziness, patient denies any focal weakness, she was able to do barium swallow eval that showed moderate esophageal dysmotility no obstruction after test patient stated that she was starting to be able to swallow better. Patient will be admitted will get speech eval,for now will keep her NPO and start diet depending of speech eval, continue verapamil drip, trend troponin, cardiologist consulted. If swallowing does not improve will consider GI consult.     Primary Care Physician  Kishore Price M.D.    Consultants  cardiologist    Code Status  Full code    Review of Systems  Review of Systems   Constitutional: Negative for chills and weight loss.   HENT: Negative for ear pain and tinnitus.    Eyes: Negative for double vision and photophobia.   Respiratory: Negative for hemoptysis and sputum production.    Cardiovascular: Negative for orthopnea and claudication.   Gastrointestinal: Negative for heartburn and melena.   Genitourinary: Negative for dysuria and urgency.   Musculoskeletal: Negative for myalgias and neck pain.   Skin: Negative for itching.   Neurological: Negative for dizziness.   Endo/Heme/Allergies: Does not bruise/bleed easily.  "  Psychiatric/Behavioral: Negative for depression.          Past Medical History  Past Medical History:   Diagnosis Date   • Bronchitis Nov, 2013   • Pneumonia feb,2013   • Hematoma complicating a procedure 11/3/2012   • Pulmonary hypertension 10/30/2012   • Mitral regurgitation 10/30/2012   • Anticoagulant long-term use 1/12/2012   • Obesity 1/12/2012   • Menopause 1/12/2012   • PVC's (premature ventricular contractions) 1/12/2012   • Glaucoma 5/3/2011   • A-fib (CMS-HCC)    • Anesthesia     \"Tachycardia for 5 days after cataract surgery\"   • Arthritis     Knees, hips   • Asthma     with pneumonia, nothing for 3 years   • Atrial fibrillation (CMS-HCC)    • Backpain    • Breath shortness     with exertion O2  2l/m PRN, hasnt used for 3 years   • CAD (coronary artery disease)    • Depression    • High cholesterol    • Hypertension    • Hypothyroid    • Lupus    • Macular degeneration    • Senile nuclear sclerosis    • Spinal stenosis of lumbar region at multiple levels    • Unspecified cataract     repaired bilateral   • Unspecified urinary incontinence        Surgical History  Past Surgical History:   Procedure Laterality Date   • KNEE ARTHROPLASTY TOTAL Right 6/23/2016    Procedure: KNEE ARTHROPLASTY TOTAL;  Surgeon: Heriberto Lozada M.D.;  Location: [facility];  Service:    • KNEE ARTHROPLASTY TOTAL Left 5/28/2015    Procedure: KNEE ARTHROPLASTY TOTAL;  Surgeon: Heriberto Lozada M.D.;  Location: [facility];  Service:    • CATARACT PHACO WITH IOL Right 5/5/2015    Procedure: IOL OD - STANDARD;  Surgeon: Dmitry Bejarano M.D.;  Location: [facility];  Service:    • CATARACT PHACO WITH IOL  4/21/2015    Performed by Dmitry Bejarano M.D. at [facility]S   • RECOVERY  11/30/2010    Performed by SURGERY, [facility]-RECOVERY at SURGERY SAME DAY [facility]   • COLONOSCOPY  2008    Normal    [facility]   • ABDOMINAL HYSTERECTOMY TOTAL  April 15,1975    still has ovaries "   • OPEN REDUCTION      left ankle   • OTHER      Removed pins from left ankle   • TONSILLECTOMY AND ADENOIDECTOMY         Medications  No current facility-administered medications on file prior to encounter.      Current Outpatient Prescriptions on File Prior to Encounter   Medication Sig Dispense Refill   • furosemide (LASIX) 40 MG Tab Take 40-80 mg by mouth 2 Times a Day. 80 mg in the Am and 40 mg at Noon     • warfarin (COUMADIN) 2.5 MG Tab Take 2.5-3.75 mg by mouth every day. 3.75 mg on Monday and Wednesday  2.5 mg all other days     • sertraline (ZOLOFT) 25 MG tablet Take 1 Tab by mouth every day. 90 Tab 3   • digoxin (LANOXIN) 125 MCG Tab Take 1 Tab by mouth every day. 90 Tab 3   • Multiple Vitamins-Minerals (ICAPS AREDS 2) Cap Take 2 Caps by mouth every day. 100 Cap 3   • potassium chloride SA (KDUR) 20 MEQ Tab CR Take 20 mEq by mouth 3 times a day. 180 Tab 3   • estradiol (ESTRACE) 2 MG Tab TAKE ONE TABLET BY MOUTH EVERY DAY 90 Tab 1   • ranitidine (ZANTAC) 150 MG Tab Take 1 Tab by mouth every day. 90 Tab 3   • lisinopril (PRINIVIL) 5 MG Tab Take 1 Tab by mouth every day. 90 Tab 2   • levothyroxine (SYNTHROID) 88 MCG Tab TAKE  ONE TABLET BY MOUTH EVERY MORNING BEFORE BREAKFAST 90 Tab 3   • magnesium oxide (MAG-OX) 400 MG Tab TAKE ONE TABLET BY MOUTH EVERY DAY 90 Tab 3   • docusate sodium (COLACE) 100 MG Cap Take 200 mg by mouth every evening.     • Diclofenac Sodium 1 % Gel Apply  to affected area(s) 2 times a day as needed (For joint pain).     • acetaminophen (TYLENOL) 500 MG TABS Take 1,000 mg by mouth 3 times a day. Indications: Pain     • sennosides-docusate sodium (SENOKOT-S) 8.6-50 MG tablet Take 3 Tabs by mouth every day. 30 Tab    • vitamin D (CHOLECALCIFEROL) 1000 UNIT TABS Take 2,000 Units by mouth every day.         Family History  Family History   Problem Relation Age of Onset   • Stroke Mother    • Diabetes Father    • Stroke Sister    • Heart Disease Brother    • Stroke Sister    • GI  "Daughter      Crohn's Disease   • Heart Disease Daughter      CHF   • Other Daughter      Chronic Pain--Lymphedema   • Cancer Paternal Aunt        Social History  Social History   Substance Use Topics   • Smoking status: Never Smoker   • Smokeless tobacco: Never Used   • Alcohol use No       Allergies  Allergies   Allergen Reactions   • Amiodarone Hives     Throat and tongue itching   • Bactrim Shortness of Breath   • Claritin-D [Loratadine-Pseudoephedrine] Palpitations   • Clotrimazole      Stinging / burning on chest   • Metoprolol      Causes throat swelling   • Morphine      Hallucinations   • Qvar [Beclomethasone Dipropionate]      Pressure on heart     • Vibramycin Shortness of Breath   • Atorvastatin Calcium-Polysorbate 80      Muscle aches     • Augmentin      Unknown reaction   • Cipro Xr Swelling   • Diltiazem      rash   • Flecainide      dizziness   • Keflex Unspecified     Pt states \"Unsure\".   • Mucinex      GI Distress     • Tramadol      crying   • Tape Rash     Paper tape okay        Physical Exam  Laboratory   Hemodynamics  Temp (24hrs), Av.4 °C (99.4 °F), Min:37.1 °C (98.8 °F), Max:37.7 °C (99.9 °F)   Temperature: 37.7 °C (99.9 °F)  Pulse  Av.3  Min: 115  Max: 170 Heart Rate (Monitored): (!) 153  Blood Pressure : 145/90, NIBP: 134/65      Respiratory      Respiration: 16, Pulse Oximetry: 96 %, O2 Daily Delivery Respiratory : Nasal Cannula     Work Of Breathing / Effort: Mild  RUL Breath Sounds: Clear, RML Breath Sounds: Clear, RLL Breath Sounds: Diminished, DO Breath Sounds: Clear, LLL Breath Sounds: Diminished    Physical Exam   Constitutional: She is oriented to person, place, and time. She appears well-developed. No distress.   HENT:   Head: Normocephalic.   Mouth/Throat: No oropharyngeal exudate.   Eyes: Conjunctivae are normal. No scleral icterus.   Neck: Normal range of motion. Neck supple. No JVD present.   Cardiovascular:   Irregular,    Pulmonary/Chest: Effort normal and breath " sounds normal. No stridor. No respiratory distress. She has no wheezes.   Abdominal: Soft. Bowel sounds are normal. She exhibits no distension. There is no tenderness.   Musculoskeletal: Normal range of motion. She exhibits no tenderness.   Lymphadenopathy:     She has no cervical adenopathy.   Neurological: She is alert and oriented to person, place, and time. She exhibits normal muscle tone.   Skin: No erythema.   Psychiatric: She has a normal mood and affect.   Nursing note and vitals reviewed.      Recent Labs      12/14/17   0349  12/15/17   1350   WBC  17.5*  10.5   RBC  4.13*  4.10*   HEMOGLOBIN  13.3  13.4   HEMATOCRIT  39.9  40.2   MCV  96.6  98.0*   MCH  32.2  32.7   MCHC  33.3*  33.3*   RDW  49.1  50.9*   PLATELETCT  320  196   MPV  10.2  9.8     Recent Labs      12/14/17   0349  12/15/17   1350   SODIUM  137  135   POTASSIUM  4.7  3.9   CHLORIDE  104  101   CO2  21  23   GLUCOSE  123*  141*   BUN  12  11   CREATININE  0.52  0.65   CALCIUM  9.1  8.8     Recent Labs      12/14/17   0349  12/15/17   1350   ALTSGPT  11  12   ASTSGOT  27  21   ALKPHOSPHAT  64  63   TBILIRUBIN  0.5  0.6   LIPASE   --   13   GLUCOSE  123*  141*     Recent Labs      12/13/17   0705  12/14/17   0349  12/15/17   1350   APTT   --    --   31.8   INR  1.84*  2.14*  2.60*             Lab Results   Component Value Date    TROPONINI 0.33 (H) 12/15/2017       Imaging  cxr reviewed  Barium swallow reviewed.   ekg showing afib with rvr.    Assessment/Plan     I anticipate this patient will require at least two midnights for appropriate medical management, necessitating inpatient admission.    Obesity (BMI 30-39.9)- (present on admission)   Assessment & Plan    Needs to lose weight.         CAD (coronary artery disease)- (present on admission)   Assessment & Plan    Has elevated troponin likely due to afib with RVR  No chest pain, cardio consulted.         HTN (hypertension)- (present on admission)   Assessment & Plan    Stable          Dysphagia- (present on admission)   Assessment & Plan    Patient could no swallow today, had barium swallow that showed Moderate esophageal dysmotility. No obstructing lesions and no extraluminal leakage of contrast identified. Patient states that is improving now, will get speech eval in AM, might need GI consult if not improving.         A-fib (CMS-HCC)- (present on admission)   Assessment & Plan    With RVR, patient s/p ablation on Monday, HR is 140's cardiologist consulted, patient is allergic to cardizem and amiodarone, discussed with cardiologist will start verapamil drip. Has elevated troponin likely due to afib with rvr no chest pain. She is on warfarin INR 2.6. Due to dysphagia holding po medication for now will try to restart medication in AM.         Hypothyroidism- (present on admission)   Assessment & Plan    Stable holding medication for now.             VTE prophylaxis: on warfarin.

## 2017-12-16 NOTE — PROGRESS NOTES
Pt and  updated on POC, pt verbalizes understanding, no questions. Pt resting comfortably in bed, denies any additional needs, call light within reach, will continue to monitor.

## 2017-12-16 NOTE — RESPIRATORY CARE
COPD EDUCATION by COPD CLINICAL EDUCATOR  12/16/2017 at 8:44 AM by Inez Hernandez     Patient reviewed by COPD education team. Patient does not qualify for COPD program.

## 2017-12-16 NOTE — PROGRESS NOTES
Pt to floor, tele box on and rhythm verified pt is a.fib. Assessment complete, VSS, bed in lowest and locked position, call light within reach, treaded socks on. Pt updated on POC.

## 2017-12-16 NOTE — THERAPY
"Speech Language Therapy Clinical Swallow Evaluation completed.    Functional Status: Patient seen today for clinical swallow evaluation. Patient quietly sleeping, awoke easily to name. Oral mechanism exam completed with no gross deficits appreciated. PO trials conducted and consisted of single ice chip via teaspoon, nectar thick liquids, purees, soft solids, and thin liquids. Laryngeal elevation palpated and felt to be adequate with no changes in vocal quality across PO. Pt did independently swallow 2x for each bite and stated that this is baseline for her, as she is a very cautious eater. Pt with prolonged mastication during trials of soft solids, and reports of increased difficulty as well as slight delay of swallow trigger upon palpation. No s/sx of aspiration noted during trials of nectars, thin liquids, or purees. Pt currently presents with mild oropharyngeal dysphagia as evidenced through prolonged mastication and slightly delayed swallow trigger. At this time, patient appears appropriate to begin a modified diet of Dysphagia 1/thin liquids with direct supervision from nursing staff. Please hold PO with any difficulty. SLP to continue following.     Recommendations - Diet: Diet / Liquid Recommendation: Dysphagia I, Thin Liquid                          Strategies: Direct supervision during meals, Assistance needed for meal tray set-up, No Straws and Head of Bed at 90 Degrees                          Medication Administration: Medication Administration : Crush all Medications in Puree  Plan of Care: Will benefit from Speech Therapy 3 times per week  Post-Acute Therapy: Discharge to home with outpatient or home health for additional skilled therapy services.    See \"Rehab Therapy-Acute\" Patient Summary Report for complete documentation.   "

## 2017-12-16 NOTE — PROGRESS NOTES
Report received, pt care assumed, tele box on. VSS, pt assessment complete. Pt aaox4, no signs of distress noted at this time. POC discussed with pt and verbalizes no questions. Pt denies need for pain medication at this time. Pt denies any additional needs at this time. Bed in lowest position, bed alarm on, pt educated on fall risk and verbalized understanding, call light within reach, will continue to monitor.

## 2017-12-16 NOTE — ASSESSMENT & PLAN NOTE
Difficulty swallowing occurred after ablation several days ago.  Barium swallow shows mod esophageal dysmotility.  Diet / Liquid Recommendation: Dysphagia I, Thin Liquid  SLP re-revaluation pending

## 2017-12-16 NOTE — ASSESSMENT & PLAN NOTE
Patient had history of afib, s/p ablation, however reoccurred.   Continue PO verapamil, and digoxin. Titrate dose per cardiology.  Continue coumadin, measure INR daily.  EP following, continue digoxin and Tikosyn   Was in Sinus rhythm. Then converted to atrial fibrillation, per cardiology will cont tikosyn for hopeful conversion

## 2017-12-16 NOTE — PROGRESS NOTES
[facility] Hospitalist Progress Note    Date of Service: 2017    Chief Complaint  77 y.o. female admitted 12/15/2017 with atrial fibrillation with RVR dysphagia.     Interval Problem Update  Patient is feeling much better, her HR is better controlled under 100. Patient denies fevers/chills, chest pain, shortness of breath or nausea/vommiting.   Continue verapamil gtt, cardiology following and titrating  Speech eval pending for dysphagia.  TSH depressed, .030, r/o thyroid storm? Checking Free T4.       Consultants/Specialty  Cardiology  SLP    Disposition  TBD        Review of Systems   Constitutional: Positive for malaise/fatigue. Negative for chills and fever.   HENT: Negative for congestion, hearing loss, sore throat and tinnitus.    Eyes: Negative for blurred vision, double vision, photophobia and pain.   Respiratory: Negative for cough, hemoptysis, sputum production, shortness of breath and stridor.    Cardiovascular: Negative for chest pain, palpitations, orthopnea, claudication and PND.   Gastrointestinal: Negative for blood in stool, constipation, heartburn, melena, nausea and vomiting.   Genitourinary: Negative for dysuria, frequency and urgency.   Musculoskeletal: Negative for back pain, myalgias and neck pain.   Neurological: Positive for weakness. Negative for dizziness, tingling, tremors, sensory change, speech change and headaches.   Psychiatric/Behavioral: Negative for depression, memory loss and suicidal ideas. The patient is not nervous/anxious.       Physical Exam  Laboratory/Imaging   Hemodynamics  Temp (24hrs), Av.1 °C (98.7 °F), Min:36.6 °C (97.8 °F), Max:37.7 °C (99.9 °F)   Temperature: 36.8 °C (98.2 °F)  Pulse  Av.2  Min: 88  Max: 170 Heart Rate (Monitored): (!) 153  Blood Pressure : 160/102, NIBP: 134/65      Respiratory      Respiration: 18, Pulse Oximetry: 96 %, O2 Daily Delivery Respiratory : Nasal Cannula     Work Of Breathing / Effort: Mild  RUL Breath Sounds: Clear, RML  Breath Sounds: Clear, RLL Breath Sounds: Diminished, DO Breath Sounds: Clear, LLL Breath Sounds: Diminished    Fluids    Intake/Output Summary (Last 24 hours) at 12/16/17 1420  Last data filed at 12/16/17 1200   Gross per 24 hour   Intake           106.86 ml   Output              400 ml   Net          -293.14 ml       Nutrition  Orders Placed This Encounter   Procedures   • DIET ORDER     Standing Status:   Standing     Number of Occurrences:   1     Order Specific Question:   Diet:     Answer:   Regular [1]     Order Specific Question:   Texture/Fiber modifications:     Answer:   Dysphagia 1(Pureed)specify fluid consistency(question 6) [1]     Order Specific Question:   Consistency/Fluid modifications:     Answer:   Thin Liquids [3]     Order Specific Question:   Miscellaneous modifications:     Answer:   SLP - 1:1 Supervision by Nursing [21]     Physical Exam   Constitutional: She is oriented to person, place, and time. She appears well-developed and well-nourished. No distress.   HENT:   Head: Normocephalic and atraumatic.   Mouth/Throat: No oropharyngeal exudate.   Eyes: Conjunctivae are normal. Pupils are equal, round, and reactive to light. Right eye exhibits no discharge. No scleral icterus.   Neck: Neck supple. No JVD present. No thyromegaly present.   Cardiovascular: Intact distal pulses.    No murmur heard.  Irregularly irregular.     Pulmonary/Chest: Effort normal and breath sounds normal. No stridor. No respiratory distress. She has no wheezes. She has no rales.   Abdominal: Soft. Bowel sounds are normal. She exhibits no distension. There is no tenderness. There is no rebound.   Musculoskeletal: Normal range of motion. She exhibits no edema.   Neurological: She is alert and oriented to person, place, and time. No cranial nerve deficit.   Skin: Skin is warm. She is not diaphoretic. No erythema.   Psychiatric: She has a normal mood and affect. Her behavior is normal. Thought content normal.       Recent  Labs      12/14/17   0349  12/15/17   1350  12/16/17   0421   WBC  17.5*  10.5  10.0   RBC  4.13*  4.10*  3.98*   HEMOGLOBIN  13.3  13.4  12.8   HEMATOCRIT  39.9  40.2  38.9   MCV  96.6  98.0*  97.7   MCH  32.2  32.7  32.2   MCHC  33.3*  33.3*  32.9*   RDW  49.1  50.9*  50.8*   PLATELETCT  320  196  227   MPV  10.2  9.8  9.8     Recent Labs      12/14/17   0349  12/15/17   1350  12/16/17   0421   SODIUM  137  135  134*   POTASSIUM  4.7  3.9  3.7   CHLORIDE  104  101  101   CO2  21  23  26   GLUCOSE  123*  141*  145*   BUN  12  11  10   CREATININE  0.52  0.65  0.51   CALCIUM  9.1  8.8  8.6     Recent Labs      12/14/17   0349  12/15/17   1350   APTT   --   31.8   INR  2.14*  2.60*         Recent Labs      12/16/17   0421   TRIGLYCERIDE  139   HDL  45   LDL  67          Assessment/Plan     * Atrial fibrillation with rapid ventricular response (CMS-HCC)- (present on admission)   Assessment & Plan    Patient had history of afib, s/p ablation, however reoccurred.   Continue Verapamil gtt, titrate per cardiology recommendations.  Continue coumadin, measure INR daily.        Obesity (BMI 30-39.9)- (present on admission)   Assessment & Plan    encouraged diet and exercise        CAD (coronary artery disease)- (present on admission)   Assessment & Plan    Elevated troponins 2/2 to rapid RVR.  Trending down, pt denies chest pain.           HTN (hypertension)- (present on admission)   Assessment & Plan    Stable , held home dose of lasix, lisinoril will resume when medically stable         Dysphagia- (present on admission)   Assessment & Plan    Difficulty swallowing occurred after ablation several days ago.  Barium swallow shows mod esophageal dysmotility.  SLP consulted pending recs        Hypothyroidism- (present on admission)   Assessment & Plan    Stable holding medication for now.             The total time spent face to face with this patient was about 38 mins of which 60% of time was spent on counseling, review of  records including pertinent lab data and studies, as well as discussing diagnostic evaluation and work up, planned therapeutic interventions and future disposition of care. Where indicated, the assessment and plan reflect discussion of patient with consultants, other healthcare providers, family members, and additional research needed to obtain further information in formulating the plan of care of this patient.         Reviewed items::  Labs reviewed, Medications reviewed and EKG reviewed  Pruett catheter::  No Pruett  DVT prophylaxis pharmacological::  Warfarin (Coumadin)

## 2017-12-16 NOTE — CONSULTS
"Cardiology Consult Note    Date of service:    12/15/2017       Patient ID:   Name:             Donte Magaña   YOB: 1940  Age:                 77 y.o.  female   MRN:               [id number]               Referring Physician: Dr. Gutierrez                                                  Reason for Consult:      A.fib    History of Present Illness:    77-year-old female with a past medical history significant for hypertension, hyperlipidemia, coronary artery disease, atrial fibrillation who presented to the ER with difficulty swallowing. She underwent underwent ablation for atrial fibrillation 3 days ago. She reports that in the past 2 days she has had difficulty swallowing. She has been unable to swallow some of her cardiac medications. She also reports decreased PO intake due to his this dysphagia.    She denies any palpitations or dyspnea. No chest discomfort or dizziness.      Review of Systems:      A full review of systems was completed and all pertinent positives and negatives are included in the HPI above. All others reviewed and negative.     Past Medical History:   Past Medical History:   Diagnosis Date   • A-fib (CMS-Formerly [facility])    • Anesthesia     \"Tachycardia for 5 days after cataract surgery\"   • Anticoagulant long-term use 1/12/2012   • Arthritis     Knees, hips   • Asthma     with pneumonia, nothing for 3 years   • Atrial fibrillation (CMS-Formerly [facility])    • Backpain    • Breath shortness     with exertion O2  2l/m PRN, hasnt used for 3 years   • Bronchitis Nov, 2013   • CAD (coronary artery disease)    • Depression    • Glaucoma 5/3/2011   • Hematoma complicating a procedure 11/3/2012   • High cholesterol    • Hypertension    • Hypothyroid    • Lupus    • Macular degeneration    • Menopause 1/12/2012   • Mitral regurgitation 10/30/2012   • Obesity 1/12/2012   • Pneumonia feb,2013   • Pulmonary hypertension 10/30/2012   • PVC's (premature ventricular contractions) 1/12/2012   • Senile nuclear " sclerosis    • Spinal stenosis of lumbar region at multiple levels    • Unspecified cataract     repaired bilateral   • Unspecified urinary incontinence      Active Hospital Problems    Diagnosis   • Obesity (BMI 30-39.9) [E66.9]     Priority: Medium   • CAD (coronary artery disease) [I25.10]     Priority: Medium   • HTN (hypertension) [I10]     Priority: Medium   • Hypothyroidism [E03.9]     Priority: Low   • A-fib (CMS-HCC) [I48.91]   • Dysphagia [R13.10]       Past Surgical History:  Past Surgical History:   Procedure Laterality Date   • KNEE ARTHROPLASTY TOTAL Right 6/23/2016    Procedure: KNEE ARTHROPLASTY TOTAL;  Surgeon: Heriberto Lozada M.D.;  Location: SURGERY [facility];  Service:    • KNEE ARTHROPLASTY TOTAL Left 5/28/2015    Procedure: KNEE ARTHROPLASTY TOTAL;  Surgeon: Heriberto Lozada M.D.;  Location: SURGERY [facility];  Service:    • CATARACT PHACO WITH IOL Right 5/5/2015    Procedure: IOL OD - STANDARD;  Surgeon: Dmitry Bejarano M.D.;  Location: SURGERY [facility];  Service:    • CATARACT PHACO WITH IOL  4/21/2015    Performed by Dmitry Bejarano M.D. at [facility]   • RECOVERY  11/30/2010    Performed by SURGERY, [facility]-RECOVERY at SURGERY SAME DAY [facility]   • COLONOSCOPY  2008    Normal    [facility]   • ABDOMINAL HYSTERECTOMY TOTAL  April 15,1975    still has ovaries   • OPEN REDUCTION      left ankle   • OTHER      Removed pins from left ankle   • TONSILLECTOMY AND ADENOIDECTOMY         Family History:  Family History   Problem Relation Age of Onset   • Stroke Mother    • Diabetes Father    • Stroke Sister    • Heart Disease Brother    • Stroke Sister    • GI Daughter      Crohn's Disease   • Heart Disease Daughter      CHF   • Other Daughter      Chronic Pain--Lymphedema   • Cancer Paternal Aunt        Social History:  Social History     Social History   • Marital status:      Spouse name: N/A   • Number of children: N/A   • Years of education: N/A      Occupational History   • Not on file.     Social History Main Topics   • Smoking status: Never Smoker   • Smokeless tobacco: Never Used   • Alcohol use No   • Drug use: No   • Sexual activity: Not Currently     Partners: Male     Birth control/ protection: Post-Menopausal     Other Topics Concern   • Not on file     Social History Narrative   • No narrative on file   No alcohol or recreational drug use.    Hospital Medications:    Current Facility-Administered Medications:   •  senna-docusate (PERICOLACE or SENOKOT S) 8.6-50 MG per tablet 2 Tab, 2 Tab, Oral, BID, Stopped at 12/15/17 2100 **AND** polyethylene glycol/lytes (MIRALAX) PACKET 1 Packet, 1 Packet, Oral, QDAY PRN **AND** magnesium hydroxide (MILK OF MAGNESIA) suspension 30 mL, 30 mL, Oral, QDAY PRN **AND** bisacodyl (DULCOLAX) suppository 10 mg, 10 mg, Rectal, QDAY PRN, Lei Gutierrez M.D.  •  Respiratory Care per Protocol, , Nebulization, Continuous RT, Lei Gutierrez M.D.  •  acetaminophen (TYLENOL) tablet 650 mg, 650 mg, Oral, Q6HRS PRN, Lei Gutierrez M.D.  •  ondansetron (ZOFRAN) syringe/vial injection 4 mg, 4 mg, Intravenous, Q4HRS PRN, Lei Gutierrez M.D.  •  ondansetron (ZOFRAN ODT) dispertab 4 mg, 4 mg, Oral, Q4HRS PRN, Lei Gutierrez M.D.  •  verapamil (ISOPTIN) 100 mg in D5W 100 mL Infusion, 5 mcg/kg/min, Intravenous, Continuous, Lei Gutierrez M.D.    Current Outpatient Medications:  Prescriptions Prior to Admission   Medication Sig Dispense Refill Last Dose   • verapamil (ISOPTIN) 120 MG Tab Take 120 mg by mouth 3 times a day. 240 MG AT 0400, 180 MG AT NOON, 180 MG AT NIGHT   12/15/2017 at AM   • furosemide (LASIX) 40 MG Tab Take 40-80 mg by mouth 2 Times a Day. 80 mg in the Am and 40 mg at Noon   12/15/2017 at 1200   • warfarin (COUMADIN) 2.5 MG Tab Take 2.5-3.75 mg by mouth every day. 3.75 mg on Monday and Wednesday  2.5 mg all other days   12/14/2017 at 1800   • sertraline (ZOLOFT) 25 MG tablet  Take 1 Tab by mouth every day. 90 Tab 3 12/15/2017   • digoxin (LANOXIN) 125 MCG Tab Take 1 Tab by mouth every day. 90 Tab 3 12/15/2017 at AM   • Multiple Vitamins-Minerals (ICAPS AREDS 2) Cap Take 2 Caps by mouth every day. 100 Cap 3 12/12/2017 at AM   • potassium chloride SA (KDUR) 20 MEQ Tab CR Take 20 mEq by mouth 3 times a day. 180 Tab 3 12/14/2017 at PM   • estradiol (ESTRACE) 2 MG Tab TAKE ONE TABLET BY MOUTH EVERY DAY 90 Tab 1 12/15/2017 at AM   • ranitidine (ZANTAC) 150 MG Tab Take 1 Tab by mouth every day. 90 Tab 3 12/14/2017 at PM   • lisinopril (PRINIVIL) 5 MG Tab Take 1 Tab by mouth every day. 90 Tab 2 12/14/2017 at UNK   • levothyroxine (SYNTHROID) 88 MCG Tab TAKE  ONE TABLET BY MOUTH EVERY MORNING BEFORE BREAKFAST 90 Tab 3 12/15/2017 at AM   • magnesium oxide (MAG-OX) 400 MG Tab TAKE ONE TABLET BY MOUTH EVERY DAY 90 Tab 3 12/15/2017 at AM   • docusate sodium (COLACE) 100 MG Cap Take 200 mg by mouth every evening.   12/14/2017 at PM   • Diclofenac Sodium 1 % Gel Apply  to affected area(s) 2 times a day as needed (For joint pain).   12/14/2017 at PM   • acetaminophen (TYLENOL) 500 MG TABS Take 1,000 mg by mouth 3 times a day. Indications: Pain   12/15/2017 at AM   • sennosides-docusate sodium (SENOKOT-S) 8.6-50 MG tablet Take 3 Tabs by mouth every day. 30 Tab  12/15/2017 at AM   • vitamin D (CHOLECALCIFEROL) 1000 UNIT TABS Take 2,000 Units by mouth every day.   12/15/2017 at AM       Medication Allergy:  Allergies   Allergen Reactions   • Amiodarone Hives     Throat and tongue itching   • Bactrim Shortness of Breath   • Claritin-D [Loratadine-Pseudoephedrine] Palpitations   • Clotrimazole      Stinging / burning on chest   • Metoprolol      Causes throat swelling   • Morphine      Hallucinations   • Qvar [Beclomethasone Dipropionate]      Pressure on heart     • Vibramycin Shortness of Breath   • Atorvastatin Calcium-Polysorbate 80      Muscle aches     • Augmentin      Unknown reaction   • Cipro Xr  "Swelling   • Diltiazem      rash   • Flecainide      dizziness   • Keflex Unspecified     Pt states \"Unsure\".   • Mucinex      GI Distress     • Tramadol      crying   • Tape Rash     Paper tape okay       Physical Exam:  Vitals/ General Appearance:   Weight/BMI: Body mass index is 31.81 kg/m².  Blood pressure (!) 162/98, pulse (!) 140, temperature 37.6 °C (99.6 °F), resp. rate 16, height 1.626 m (5' 4.02\"), weight 84.1 kg (185 lb 6.5 oz), last menstrual period 01/01/1993, SpO2 93 %.  Vitals:    12/15/17 1920 12/15/17 2000 12/15/17 2057 12/15/17 2100   BP:    (!) 162/98   Pulse:  (!) 115 (!) 154 (!) 140   Resp:   16 16   Temp: 37.7 °C (99.9 °F)   37.6 °C (99.6 °F)   SpO2:  91% 96% 93%   Weight:    84.1 kg (185 lb 6.5 oz)   Height:    1.626 m (5' 4.02\")       Constitutional:   Well developed, Well nourished, No acute distress   HEENT:  Normocephalic, Atraumatic  Eyes: Conjunctiva normal  Neck:  Normal range of motion, no JVD.  Cardiovascular:  Normal heart rate, Normal rhythm, No murmurs, No rubs, No gallops. Extremities with intact distal pulses, no cyanosis, or edema.   Lungs:  Normal breath sounds, breath sounds clear to auscultation bilaterally,  no crackles, no wheezing.   Abdomen:  Soft, No tenderness  Skin: No rash  Neurologic: Alert & oriented x 3   Psychiatric: Affect normal     MDM (Data Review):     Records reviewed and summarized in current documentation    Lab Data Review:  Recent Labs      12/14/17   0349  12/15/17   1350   WBC  17.5*  10.5   RBC  4.13*  4.10*   HEMOGLOBIN  13.3  13.4   HEMATOCRIT  39.9  40.2   MCV  96.6  98.0*   MCH  32.2  32.7   MCHC  33.3*  33.3*   RDW  49.1  50.9*   PLATELETCT  320  196   MPV  10.2  9.8     Recent Labs      12/14/17   0349  12/15/17   1350   SODIUM  137  135   POTASSIUM  4.7  3.9   CHLORIDE  104  101   CO2  21  23   GLUCOSE  123*  141*   BUN  12  11   CREATININE  0.52  0.65   CALCIUM  9.1  8.8     Recent Labs      12/15/17   1350   TROPONINI  0.33*       Labs " reviewed as noted above.    Imaging/Procedures Review:      EKG:Atrial fibrillation with rapid ventricular response, left wrist deviation, diffuse ST changes.    ECHO: 6/2017  CONCLUSIONS  Sinus rhythm.  Left ventricular ejection fraction is visually estimated to be 60%.  Normal left atrial size.  No significant valve disease or flow abnormalities.   Right ventricular systolic pressure is estimated to be 35 mmHg.  No prior study is available for comparison.       MDM (Assessment and Plan):     Active Hospital Problems    Diagnosis   • Obesity (BMI 30-39.9) [E66.9]     Priority: Medium   • CAD (coronary artery disease) [I25.10]     Priority: Medium   • HTN (hypertension) [I10]     Priority: Medium   • Hypothyroidism [E03.9]     Priority: Low   • A-fib (CMS-HCC) [I48.91]   • Dysphagia [R13.10]     Atrial fibrillation with rapid ventricular response: Likely secondary to inability to take her cardiac medications along with decreased oral intake. Dysphagia workup per primary team.  For now I have recommended to the primary team to start her on a verapamil drip. Also restart digoxin. Will give her 250 mcg today. Resume regular dose from tomorrow.  Continue Coumadin.    NSTEMI: Likely type II, demand ischemia in the setting of atrial fibrillation with rapid ventricular response. Continue check cardiac markers at this time. Start aspirin. No statin due to history of allergy. No heparin drip as patient is therapeutic on Coumadin. Not on a beta blocker due to allergy history. Currently on verapamil.    Thank you for allowing me to participate in the care of this patient. Please do not hesitate to contact me with any questions.    Shayy Ferreira MD  Cardiologist  [facility]

## 2017-12-16 NOTE — PROGRESS NOTES
Cardiology Progress Note               Author: Yarelis Mahoney Date & Time created: 2017  12:03 PM     Consultation for afib 's in the setting of recent afib ablation 17    Admitted with difficulty swallowing    History of hypertension, hyperlipidemia, coronary artery disease, atrial fibrillation status post ablation for A. fib on 17    Labs reviewed  Sodium, potassium, creatinine stable  Troponin 0.33    BP = 117/72  HR =  afib       Review of Systems   Cardiovascular: Negative for chest pain, palpitations, orthopnea, claudication, leg swelling and PND.       Physical Exam   Constitutional: She is oriented to person, place, and time.   HENT:   Head: Normocephalic.   Eyes: Conjunctivae are normal.   Neck: No JVD present. No thyromegaly present.   Cardiovascular: Normal rate.  An irregularly irregular rhythm present.   Pulses:       Carotid pulses are 2+ on the right side, and 2+ on the left side.       Radial pulses are 2+ on the right side, and 2+ on the left side.   Pulmonary/Chest: She has no wheezes.   Abdominal: Soft.   Musculoskeletal: She exhibits no edema.   Neurological: She is alert and oriented to person, place, and time.   Skin: Skin is warm and dry.       Hemodynamics:  Temp (24hrs), Av.1 °C (98.8 °F), Min:36.6 °C (97.8 °F), Max:37.7 °C (99.9 °F)  Temperature: 36.6 °C (97.8 °F)  Pulse  Av.6  Min: 96  Max: 170Heart Rate (Monitored): (!) 153  Blood Pressure : 117/72, NIBP: 134/65     Respiratory:    Respiration: 18, Pulse Oximetry: 90 %, O2 Daily Delivery Respiratory : Nasal Cannula     Work Of Breathing / Effort: Mild  RUL Breath Sounds: Clear, RML Breath Sounds: Clear, RLL Breath Sounds: Diminished, DO Breath Sounds: Clear, LLL Breath Sounds: Diminished  Fluids:  Date 17 0700 - 17 0659   Shift 3518-1630 0154-6819 1649-2137 24 Hour Total   I  N  T  A  K  E   Shift Total       O  U  T  P  U  T   Urine 50   50    Shift Total 50   50   Weight (kg) 84.1  84.1 84.1 84.1       Weight: 84.1 kg (185 lb 6.5 oz)  GI/Nutrition:  Orders Placed This Encounter   Procedures   • Diet NPO     Standing Status:   Standing     Number of Occurrences:   1     Order Specific Question:   Restrict to:     Answer:   Ice Chips [2]     Lab Results:  Recent Labs      12/14/17   0349  12/15/17   1350  12/16/17   0421   WBC  17.5*  10.5  10.0   RBC  4.13*  4.10*  3.98*   HEMOGLOBIN  13.3  13.4  12.8   HEMATOCRIT  39.9  40.2  38.9   MCV  96.6  98.0*  97.7   MCH  32.2  32.7  32.2   MCHC  33.3*  33.3*  32.9*   RDW  49.1  50.9*  50.8*   PLATELETCT  320  196  227   MPV  10.2  9.8  9.8     Recent Labs      12/14/17   0349  12/15/17   1350  12/16/17   0421   SODIUM  137  135  134*   POTASSIUM  4.7  3.9  3.7   CHLORIDE  104  101  101   CO2  21  23  26   GLUCOSE  123*  141*  145*   BUN  12  11  10   CREATININE  0.52  0.65  0.51   CALCIUM  9.1  8.8  8.6     Recent Labs      12/14/17   0349  12/15/17   1350   APTT   --   31.8   INR  2.14*  2.60*         Recent Labs      12/15/17   1350   TROPONINI  0.33*     Recent Labs      12/16/17   0421   TRIGLYCERIDE  139   HDL  45   LDL  67         Medical Decision Making, by Problem:  Active Hospital Problems    Diagnosis   • Obesity (BMI 30-39.9) [E66.9]   • CAD (coronary artery disease) [I25.10]   • HTN (hypertension) [I10]   • Hypothyroidism [E03.9]   • A-fib (CMS-HCC) [I48.91]   • Dysphagia [R13.10]       Assessment/plan    Consultation for A. fib RVR, rate 140s, in the setting of recent ablation on 12/13/17, verapamil drip was started, rate controlled at 80's, may need to switch to PO, call from pharmacy informing shortage of IV Verapamil     Complaint of dysphagia s/p barium swallow on 12/15/17 showed moderate esophageal dysmotility, no obstructing lesions and no extraluminal leakage of contrast identified    Limited echo pending      Reviewed items::  Medications reviewed and Labs reviewed

## 2017-12-16 NOTE — ASSESSMENT & PLAN NOTE
TSH depressed, .030, Free T4 mildly elevated.  Previous TSH/Free T4 wnl.   Hold synthroid, will need lower dose and needs re-checking in 3-4 weeks.

## 2017-12-16 NOTE — ED NOTES
Assisted pt to use bedpan. Pt uses walker at home. Pt is feeling too weak at present to walk to BR. Pt voided.

## 2017-12-16 NOTE — PROGRESS NOTES
2 RN skin check complete with Valarie DELGADO.  Pts feet are flaky and cracked, bandage from femoral site from ablation no swelling or drainage noted. Right ear red behind the ear but blanching. Other wise pts skin was clean dry and intact with no skin breakdown.

## 2017-12-16 NOTE — PROGRESS NOTES
Cardiology brief note    Patient has an RVR.  Verapamil drip rate increased to 7.5 mcg/kg/min.   Will follow up heart rate in the afternoon. Increase verapamil dosage if can be increased further. Otherwise will consider adding amiodarone.    Formal note to follow.     Shayy Ferreira MD  Cardiologist  [facility]

## 2017-12-17 LAB
ALBUMIN SERPL BCP-MCNC: 3 G/DL (ref 3.2–4.9)
ALBUMIN/GLOB SERPL: 1.1 G/DL
ALP SERPL-CCNC: 52 U/L (ref 30–99)
ALT SERPL-CCNC: 9 U/L (ref 2–50)
ANION GAP SERPL CALC-SCNC: 8 MMOL/L (ref 0–11.9)
AST SERPL-CCNC: 17 U/L (ref 12–45)
BASOPHILS # BLD AUTO: 0.3 % (ref 0–1.8)
BASOPHILS # BLD: 0.03 K/UL (ref 0–0.12)
BILIRUB SERPL-MCNC: 0.6 MG/DL (ref 0.1–1.5)
BUN SERPL-MCNC: 11 MG/DL (ref 8–22)
CALCIUM SERPL-MCNC: 8.8 MG/DL (ref 8.5–10.5)
CHLORIDE SERPL-SCNC: 102 MMOL/L (ref 96–112)
CO2 SERPL-SCNC: 26 MMOL/L (ref 20–33)
CREAT SERPL-MCNC: 0.46 MG/DL (ref 0.5–1.4)
EOSINOPHIL # BLD AUTO: 0.41 K/UL (ref 0–0.51)
EOSINOPHIL NFR BLD: 4.7 % (ref 0–6.9)
ERYTHROCYTE [DISTWIDTH] IN BLOOD BY AUTOMATED COUNT: 49.9 FL (ref 35.9–50)
GFR SERPL CREATININE-BSD FRML MDRD: >60 ML/MIN/1.73 M 2
GLOBULIN SER CALC-MCNC: 2.8 G/DL (ref 1.9–3.5)
GLUCOSE SERPL-MCNC: 94 MG/DL (ref 65–99)
HCT VFR BLD AUTO: 37.8 % (ref 37–47)
HGB BLD-MCNC: 12.3 G/DL (ref 12–16)
IMM GRANULOCYTES # BLD AUTO: 0.06 K/UL (ref 0–0.11)
IMM GRANULOCYTES NFR BLD AUTO: 0.7 % (ref 0–0.9)
LV EJECT FRACT MOD 2C 99903: 49
LV EJECT FRACT MOD 4C 99902: 55.99
LV EJECT FRACT MOD BP 99901: 51.4
LYMPHOCYTES # BLD AUTO: 2.72 K/UL (ref 1–4.8)
LYMPHOCYTES NFR BLD: 31.3 % (ref 22–41)
MAGNESIUM SERPL-MCNC: 2.2 MG/DL (ref 1.5–2.5)
MCH RBC QN AUTO: 31.8 PG (ref 27–33)
MCHC RBC AUTO-ENTMCNC: 32.5 G/DL (ref 33.6–35)
MCV RBC AUTO: 97.7 FL (ref 81.4–97.8)
MONOCYTES # BLD AUTO: 0.87 K/UL (ref 0–0.85)
MONOCYTES NFR BLD AUTO: 10 % (ref 0–13.4)
NEUTROPHILS # BLD AUTO: 4.6 K/UL (ref 2–7.15)
NEUTROPHILS NFR BLD: 53 % (ref 44–72)
NRBC # BLD AUTO: 0 K/UL
NRBC BLD AUTO-RTO: 0 /100 WBC
PLATELET # BLD AUTO: 228 K/UL (ref 164–446)
PMV BLD AUTO: 10 FL (ref 9–12.9)
POTASSIUM SERPL-SCNC: 3.4 MMOL/L (ref 3.6–5.5)
PROT SERPL-MCNC: 5.8 G/DL (ref 6–8.2)
RBC # BLD AUTO: 3.87 M/UL (ref 4.2–5.4)
SODIUM SERPL-SCNC: 136 MMOL/L (ref 135–145)
WBC # BLD AUTO: 8.7 K/UL (ref 4.8–10.8)

## 2017-12-17 PROCEDURE — 93321 DOPPLER ECHO F-UP/LMTD STD: CPT

## 2017-12-17 PROCEDURE — 99232 SBSQ HOSP IP/OBS MODERATE 35: CPT | Performed by: HOSPITALIST

## 2017-12-17 PROCEDURE — 700102 HCHG RX REV CODE 250 W/ 637 OVERRIDE(OP): Performed by: HOSPITALIST

## 2017-12-17 PROCEDURE — 700111 HCHG RX REV CODE 636 W/ 250 OVERRIDE (IP): Performed by: INTERNAL MEDICINE

## 2017-12-17 PROCEDURE — A9270 NON-COVERED ITEM OR SERVICE: HCPCS | Performed by: HOSPITALIST

## 2017-12-17 PROCEDURE — 93321 DOPPLER ECHO F-UP/LMTD STD: CPT | Mod: 26 | Performed by: INTERNAL MEDICINE

## 2017-12-17 PROCEDURE — 93308 TTE F-UP OR LMTD: CPT | Mod: 26 | Performed by: INTERNAL MEDICINE

## 2017-12-17 PROCEDURE — A9270 NON-COVERED ITEM OR SERVICE: HCPCS | Performed by: NURSE PRACTITIONER

## 2017-12-17 PROCEDURE — 93325 DOPPLER ECHO COLOR FLOW MAPG: CPT

## 2017-12-17 PROCEDURE — 770020 HCHG ROOM/CARE - TELE (206)

## 2017-12-17 PROCEDURE — 85025 COMPLETE CBC W/AUTO DIFF WBC: CPT

## 2017-12-17 PROCEDURE — 93325 DOPPLER ECHO COLOR FLOW MAPG: CPT | Mod: 26 | Performed by: INTERNAL MEDICINE

## 2017-12-17 PROCEDURE — 93308 TTE F-UP OR LMTD: CPT

## 2017-12-17 PROCEDURE — 80053 COMPREHEN METABOLIC PANEL: CPT

## 2017-12-17 PROCEDURE — 700102 HCHG RX REV CODE 250 W/ 637 OVERRIDE(OP): Performed by: NURSE PRACTITIONER

## 2017-12-17 PROCEDURE — 700102 HCHG RX REV CODE 250 W/ 637 OVERRIDE(OP): Performed by: INTERNAL MEDICINE

## 2017-12-17 PROCEDURE — 36415 COLL VENOUS BLD VENIPUNCTURE: CPT

## 2017-12-17 PROCEDURE — 83735 ASSAY OF MAGNESIUM: CPT

## 2017-12-17 PROCEDURE — A9270 NON-COVERED ITEM OR SERVICE: HCPCS | Performed by: INTERNAL MEDICINE

## 2017-12-17 RX ORDER — DIGOXIN 125 MCG
125 TABLET ORAL DAILY
Status: DISCONTINUED | OUTPATIENT
Start: 2017-12-17 | End: 2017-12-19

## 2017-12-17 RX ORDER — DIGOXIN 0.25 MG/ML
500 INJECTION INTRAMUSCULAR; INTRAVENOUS ONCE
Status: COMPLETED | OUTPATIENT
Start: 2017-12-17 | End: 2017-12-17

## 2017-12-17 RX ADMIN — VERAPAMIL HYDROCHLORIDE 180 MG: 120 TABLET, FILM COATED ORAL at 07:11

## 2017-12-17 RX ADMIN — VERAPAMIL HYDROCHLORIDE 180 MG: 120 TABLET, FILM COATED ORAL at 14:56

## 2017-12-17 RX ADMIN — ASPIRIN 81 MG: 81 TABLET, COATED ORAL at 08:30

## 2017-12-17 RX ADMIN — STANDARDIZED SENNA CONCENTRATE AND DOCUSATE SODIUM 2 TABLET: 8.6; 5 TABLET, FILM COATED ORAL at 08:30

## 2017-12-17 RX ADMIN — DIGOXIN 125 MCG: 125 TABLET ORAL at 17:39

## 2017-12-17 RX ADMIN — DIGOXIN 500 MCG: 0.25 INJECTION INTRAMUSCULAR; INTRAVENOUS at 12:37

## 2017-12-17 RX ADMIN — VERAPAMIL HYDROCHLORIDE 180 MG: 120 TABLET, FILM COATED ORAL at 21:34

## 2017-12-17 ASSESSMENT — PATIENT HEALTH QUESTIONNAIRE - PHQ9
1. LITTLE INTEREST OR PLEASURE IN DOING THINGS: NOT AT ALL
SUM OF ALL RESPONSES TO PHQ QUESTIONS 1-9: 0
2. FEELING DOWN, DEPRESSED, IRRITABLE, OR HOPELESS: NOT AT ALL
SUM OF ALL RESPONSES TO PHQ9 QUESTIONS 1 AND 2: 0

## 2017-12-17 ASSESSMENT — PAIN SCALES - GENERAL
PAINLEVEL_OUTOF10: 2
PAINLEVEL_OUTOF10: 0

## 2017-12-17 ASSESSMENT — ENCOUNTER SYMPTOMS
NAUSEA: 0
HEARTBURN: 0
PND: 0
NECK PAIN: 0
TINGLING: 0
HEMOPTYSIS: 0
BACK PAIN: 0
DOUBLE VISION: 0
SPEECH CHANGE: 0
FEVER: 0
CONSTIPATION: 0
CHILLS: 0
SPUTUM PRODUCTION: 0
DEPRESSION: 0
TREMORS: 0
MEMORY LOSS: 0
MYALGIAS: 0
BLURRED VISION: 0
ORTHOPNEA: 0
NERVOUS/ANXIOUS: 0
DIZZINESS: 0
VOMITING: 0
PALPITATIONS: 0
SENSORY CHANGE: 0
BLOOD IN STOOL: 0
COUGH: 0
EYE PAIN: 0
PHOTOPHOBIA: 0
SORE THROAT: 0
SHORTNESS OF BREATH: 0
HEADACHES: 0
STRIDOR: 0
CLAUDICATION: 0
WEAKNESS: 1

## 2017-12-17 ASSESSMENT — COPD QUESTIONNAIRES
DO YOU EVER COUGH UP ANY MUCUS OR PHLEGM?: NO/ONLY WITH OCCASIONAL COLDS OR INFECTIONS
HAVE YOU SMOKED AT LEAST 100 CIGARETTES IN YOUR ENTIRE LIFE: NO/DON'T KNOW
COPD SCREENING SCORE: 3
DURING THE PAST 4 WEEKS HOW MUCH DID YOU FEEL SHORT OF BREATH: NONE/LITTLE OF THE TIME

## 2017-12-17 ASSESSMENT — LIFESTYLE VARIABLES: DO YOU DRINK ALCOHOL: NO

## 2017-12-17 NOTE — PROGRESS NOTES
[facility] Hospitalist Progress Note    Date of Service: 2017    Chief Complaint  77 y.o. female admitted 12/15/2017 with atrial fibrillation with RVR dysphagia.     Interval Problem Update  Patient is feeling much better, her HR is better controlled under 100. Patient denies fevers/chills, chest pain, shortness of breath or nausea/vommiting.   Continue verapamil gtt, cardiology following and titrating  Speech eval pending for dysphagia.  TSH depressed, .030, r/o thyroid storm? Checking Free T4.         No acute issues overnight, patient feels tired, but feels better, and reports less throat discomfort.  HR stable.  Continue PO verapamil, and digoxin. Titrate dose per cardiology.    Consultants/Specialty  Cardiology  SLP    Disposition  TBD        Review of Systems   Constitutional: Positive for malaise/fatigue. Negative for chills and fever.   HENT: Negative for congestion, hearing loss, sore throat and tinnitus.    Eyes: Negative for blurred vision, double vision, photophobia and pain.   Respiratory: Negative for cough, hemoptysis, sputum production, shortness of breath and stridor.    Cardiovascular: Negative for chest pain, palpitations, orthopnea, claudication and PND.   Gastrointestinal: Negative for blood in stool, constipation, heartburn, melena, nausea and vomiting.   Genitourinary: Negative for dysuria, frequency and urgency.   Musculoskeletal: Negative for back pain, myalgias and neck pain.   Neurological: Positive for weakness. Negative for dizziness, tingling, tremors, sensory change, speech change and headaches.   Psychiatric/Behavioral: Negative for depression, memory loss and suicidal ideas. The patient is not nervous/anxious.       Physical Exam  Laboratory/Imaging   Hemodynamics  Temp (24hrs), Av.5 °C (97.7 °F), Min:36.2 °C (97.2 °F), Max:37 °C (98.6 °F)   Temperature: 36.2 °C (97.2 °F)  Pulse  Av  Min: 88  Max: 170   Blood Pressure : 145/68      Respiratory      Respiration: 18,  Pulse Oximetry: 96 %     Work Of Breathing / Effort: Mild  RUL Breath Sounds: Clear, RML Breath Sounds: Clear, RLL Breath Sounds: Diminished, DO Breath Sounds: Clear, LLL Breath Sounds: Diminished    Fluids    Intake/Output Summary (Last 24 hours) at 12/17/17 1336  Last data filed at 12/16/17 2218   Gross per 24 hour   Intake              100 ml   Output              150 ml   Net              -50 ml       Nutrition  Orders Placed This Encounter   Procedures   • DIET ORDER     Standing Status:   Standing     Number of Occurrences:   1     Order Specific Question:   Diet:     Answer:   Regular [1]     Order Specific Question:   Texture/Fiber modifications:     Answer:   Dysphagia 1(Pureed)specify fluid consistency(question 6) [1]     Order Specific Question:   Consistency/Fluid modifications:     Answer:   Thin Liquids [3]     Order Specific Question:   Miscellaneous modifications:     Answer:   SLP - 1:1 Supervision by Nursing [21]     Physical Exam   Constitutional: She is oriented to person, place, and time. She appears well-developed and well-nourished. No distress.   HENT:   Head: Normocephalic and atraumatic.   Mouth/Throat: No oropharyngeal exudate.   Eyes: Conjunctivae are normal. Pupils are equal, round, and reactive to light. Right eye exhibits no discharge. No scleral icterus.   Neck: Neck supple. No JVD present. No thyromegaly present.   Cardiovascular: Intact distal pulses.    No murmur heard.  Irregularly irregular.     Pulmonary/Chest: Effort normal and breath sounds normal. No stridor. No respiratory distress. She has no wheezes. She has no rales.   Abdominal: Soft. Bowel sounds are normal. She exhibits no distension. There is no tenderness. There is no rebound.   Musculoskeletal: Normal range of motion. She exhibits no edema.   Neurological: She is alert and oriented to person, place, and time. No cranial nerve deficit.   Skin: Skin is warm. She is not diaphoretic. No erythema.   Psychiatric: She has  a normal mood and affect. Her behavior is normal. Thought content normal.       Recent Labs      12/15/17   1350  12/16/17   0421  12/17/17   0430   WBC  10.5  10.0  8.7   RBC  4.10*  3.98*  3.87*   HEMOGLOBIN  13.4  12.8  12.3   HEMATOCRIT  40.2  38.9  37.8   MCV  98.0*  97.7  97.7   MCH  32.7  32.2  31.8   MCHC  33.3*  32.9*  32.5*   RDW  50.9*  50.8*  49.9   PLATELETCT  196  227  228   MPV  9.8  9.8  10.0     Recent Labs      12/15/17   1350  12/16/17   0421  12/17/17   0430   SODIUM  135  134*  136   POTASSIUM  3.9  3.7  3.4*   CHLORIDE  101  101  102   CO2  23  26  26   GLUCOSE  141*  145*  94   BUN  11  10  11   CREATININE  0.65  0.51  0.46*   CALCIUM  8.8  8.6  8.8     Recent Labs      12/15/17   1350   APTT  31.8   INR  2.60*         Recent Labs      12/16/17   0421   TRIGLYCERIDE  139   HDL  45   LDL  67          Assessment/Plan     * Atrial fibrillation with rapid ventricular response (CMS-HCC)- (present on admission)   Assessment & Plan    Patient had history of afib, s/p ablation, however reoccurred.   Continue PO verapamil, and digoxin. Titrate dose per cardiology.  Continue coumadin, measure INR daily.        Obesity (BMI 30-39.9)- (present on admission)   Assessment & Plan    encouraged diet and exercise        CAD (coronary artery disease)- (present on admission)   Assessment & Plan    Elevated troponins 2/2 to rapid RVR.  HR controlled  Patient denies chest pain.        HTN (hypertension)- (present on admission)   Assessment & Plan    Stable , held home dose of lasix, lisinoril will resume when medically stable         Hyperthyroidism- (present on admission)   Assessment & Plan    TSH depressed, .030, Free T4 mildly elevated.  Previous TSH/Free T4 wnl.   Hold synthroid, will need lower dose and needs re-checking in 3-4 weeks.        Dysphagia- (present on admission)   Assessment & Plan    Difficulty swallowing occurred after ablation several days ago.  Barium swallow shows mod esophageal  dysmotility.  SLP following.        Hypothyroidism- (present on admission)   Assessment & Plan    Stable holding medication for now.           Patient plan of care discussed at multidisplinary team rounds and with patient and R.N at beside.      Reviewed items::  Labs reviewed, Medications reviewed and EKG reviewed  Pruett catheter::  No Pruett  DVT prophylaxis pharmacological::  Warfarin (Coumadin)

## 2017-12-17 NOTE — PROGRESS NOTES
Resting quietluy in bed talking to MD and . No distress noted. No changes noted in initial assessment. Needs addressed. Call bell and personal items in reach.

## 2017-12-17 NOTE — PROGRESS NOTES
Cardiology Progress Note               Author: Yarelis Mahoney Date & Time created: 2017  10:54 AM     Consultation for afib 's in the setting of recent afib ablation 17    Admitted with difficulty swallowing    History of hypertension, hyperlipidemia, coronary artery disease, atrial fibrillation status post ablation for A. fib on 17    Labs reviewed  Sodium, potassium, creatinine stable  Troponin 0.33    BP = 117/72  HR =  afib       Review of Systems   Cardiovascular: Negative for chest pain, palpitations, orthopnea, claudication, leg swelling and PND.       Physical Exam   Constitutional: She is oriented to person, place, and time.   HENT:   Head: Normocephalic.   Eyes: Conjunctivae are normal.   Neck: No JVD present. No thyromegaly present.   Cardiovascular: Normal rate.  An irregularly irregular rhythm present.   Pulses:       Carotid pulses are 2+ on the right side, and 2+ on the left side.       Radial pulses are 2+ on the right side, and 2+ on the left side.   Pulmonary/Chest: She has no wheezes.   Abdominal: Soft.   Musculoskeletal: She exhibits no edema.   Neurological: She is alert and oriented to person, place, and time.   Skin: Skin is warm and dry.       Hemodynamics:  Temp (24hrs), Av.6 °C (97.9 °F), Min:36.2 °C (97.2 °F), Max:37 °C (98.6 °F)  Temperature: 37 °C (98.6 °F)  Pulse  Av.1  Min: 88  Max: 170  Blood Pressure : 151/73     Respiratory:    Respiration: 20, Pulse Oximetry: 90 %        RUL Breath Sounds: Clear, RML Breath Sounds: Clear, RLL Breath Sounds: Diminished, DO Breath Sounds: Clear, LLL Breath Sounds: Diminished  Fluids:  Date 17 0700 - 17 0659   Shift 1854-1788 6780-3879 3255-4347 24 Hour Total   I  N  T  A  K  E   Shift Total       O  U  T  P  U  T   Urine 50   50    Shift Total 50   50   Weight (kg) 84.1 84.1 84.1 84.1          GI/Nutrition:  Orders Placed This Encounter   Procedures   • DIET ORDER     Standing Status:   Standing      Number of Occurrences:   1     Order Specific Question:   Diet:     Answer:   Regular [1]     Order Specific Question:   Texture/Fiber modifications:     Answer:   Dysphagia 1(Pureed)specify fluid consistency(question 6) [1]     Order Specific Question:   Consistency/Fluid modifications:     Answer:   Thin Liquids [3]     Order Specific Question:   Miscellaneous modifications:     Answer:   SLP - 1:1 Supervision by Nursing [21]     Lab Results:  Recent Labs      12/15/17   1350  12/16/17   0421  12/17/17   0430   WBC  10.5  10.0  8.7   RBC  4.10*  3.98*  3.87*   HEMOGLOBIN  13.4  12.8  12.3   HEMATOCRIT  40.2  38.9  37.8   MCV  98.0*  97.7  97.7   MCH  32.7  32.2  31.8   MCHC  33.3*  32.9*  32.5*   RDW  50.9*  50.8*  49.9   PLATELETCT  196  227  228   MPV  9.8  9.8  10.0     Recent Labs      12/15/17   1350  12/16/17   0421  12/17/17   0430   SODIUM  135  134*  136   POTASSIUM  3.9  3.7  3.4*   CHLORIDE  101  101  102   CO2  23  26  26   GLUCOSE  141*  145*  94   BUN  11  10  11   CREATININE  0.65  0.51  0.46*   CALCIUM  8.8  8.6  8.8     Recent Labs      12/15/17   1350   APTT  31.8   INR  2.60*         Recent Labs      12/15/17   1350   TROPONINI  0.33*     Recent Labs      12/16/17   0421   TRIGLYCERIDE  139   HDL  45   LDL  67         Medical Decision Making, by Problem:  Active Hospital Problems    Diagnosis   • Obesity (BMI 30-39.9) [E66.9]   • CAD (coronary artery disease) [I25.10]   • HTN (hypertension) [I10]   • Hypothyroidism [E03.9]   • A-fib (CMS-HCC) [I48.91]   • Dysphagia [R13.10]       Assessment/plan    Consultation for A. fib RVR, rate 140s, in the setting of recent ablation on 12/13/17, verapamil drip was started, rate controlled at 80's, was switched to PO 12/16/17 ,  pharmacy stated shortage of IV Verapamil , allergy to Diltiazem, rate not controlled on PO Verapamil. Discussed with DR Ferreira will add Dig load, EP consult in am , patient asymptomatic     Complaint of dysphagia s/p barium  swallow on 12/15/17 showed moderate esophageal dysmotility, no obstructing lesions and no extraluminal leakage of contrast identified    Limited echo pending      Reviewed items::  Medications reviewed and Labs reviewed

## 2017-12-17 NOTE — CARE PLAN
Problem: Mobility  Goal: Risk for activity intolerance will decrease    Intervention: Assess and monitor signs of activity intolerance  Increase mobility with ambulation and OOB to chair.

## 2017-12-17 NOTE — PROGRESS NOTES
Report received, pt care assumed, tele box on. VSS, pt assessment complete. Pt aaox4, no signs of distress noted at this time. POC discussed with pt and verbalizes no questions. Pt denies pain at this time. Pt denies any additional needs at this time. Bed in lowest position, bed alarm on, pt educated on fall risk and verbalized understanding, call light within reach, will continue to monitor.

## 2017-12-18 LAB
ALBUMIN SERPL BCP-MCNC: 3.1 G/DL (ref 3.2–4.9)
ALBUMIN SERPL BCP-MCNC: 3.2 G/DL (ref 3.2–4.9)
ALBUMIN/GLOB SERPL: 1.1 G/DL
ALBUMIN/GLOB SERPL: 1.2 G/DL
ALP SERPL-CCNC: 55 U/L (ref 30–99)
ALP SERPL-CCNC: 60 U/L (ref 30–99)
ALT SERPL-CCNC: 11 U/L (ref 2–50)
ALT SERPL-CCNC: 14 U/L (ref 2–50)
ANION GAP SERPL CALC-SCNC: 10 MMOL/L (ref 0–11.9)
ANION GAP SERPL CALC-SCNC: 8 MMOL/L (ref 0–11.9)
AST SERPL-CCNC: 21 U/L (ref 12–45)
AST SERPL-CCNC: 22 U/L (ref 12–45)
BASOPHILS # BLD AUTO: 0.4 % (ref 0–1.8)
BASOPHILS # BLD: 0.04 K/UL (ref 0–0.12)
BILIRUB SERPL-MCNC: 1 MG/DL (ref 0.1–1.5)
BILIRUB SERPL-MCNC: 1.1 MG/DL (ref 0.1–1.5)
BUN SERPL-MCNC: 11 MG/DL (ref 8–22)
BUN SERPL-MCNC: 11 MG/DL (ref 8–22)
CALCIUM SERPL-MCNC: 8.8 MG/DL (ref 8.5–10.5)
CALCIUM SERPL-MCNC: 9 MG/DL (ref 8.5–10.5)
CHLORIDE SERPL-SCNC: 102 MMOL/L (ref 96–112)
CHLORIDE SERPL-SCNC: 105 MMOL/L (ref 96–112)
CO2 SERPL-SCNC: 25 MMOL/L (ref 20–33)
CO2 SERPL-SCNC: 30 MMOL/L (ref 20–33)
CREAT SERPL-MCNC: 0.35 MG/DL (ref 0.5–1.4)
CREAT SERPL-MCNC: 0.44 MG/DL (ref 0.5–1.4)
EKG IMPRESSION: NORMAL
EOSINOPHIL # BLD AUTO: 0.38 K/UL (ref 0–0.51)
EOSINOPHIL NFR BLD: 3.6 % (ref 0–6.9)
ERYTHROCYTE [DISTWIDTH] IN BLOOD BY AUTOMATED COUNT: 48 FL (ref 35.9–50)
GFR SERPL CREATININE-BSD FRML MDRD: >60 ML/MIN/1.73 M 2
GFR SERPL CREATININE-BSD FRML MDRD: >60 ML/MIN/1.73 M 2
GLOBULIN SER CALC-MCNC: 2.6 G/DL (ref 1.9–3.5)
GLOBULIN SER CALC-MCNC: 2.8 G/DL (ref 1.9–3.5)
GLUCOSE SERPL-MCNC: 110 MG/DL (ref 65–99)
GLUCOSE SERPL-MCNC: 111 MG/DL (ref 65–99)
HCT VFR BLD AUTO: 39.6 % (ref 37–47)
HGB BLD-MCNC: 12.8 G/DL (ref 12–16)
IMM GRANULOCYTES # BLD AUTO: 0.06 K/UL (ref 0–0.11)
IMM GRANULOCYTES NFR BLD AUTO: 0.6 % (ref 0–0.9)
INR PPP: 1.43 (ref 0.87–1.13)
LYMPHOCYTES # BLD AUTO: 2.41 K/UL (ref 1–4.8)
LYMPHOCYTES NFR BLD: 22.6 % (ref 22–41)
MAGNESIUM SERPL-MCNC: 2 MG/DL (ref 1.5–2.5)
MAGNESIUM SERPL-MCNC: 2 MG/DL (ref 1.5–2.5)
MCH RBC QN AUTO: 31.1 PG (ref 27–33)
MCHC RBC AUTO-ENTMCNC: 32.3 G/DL (ref 33.6–35)
MCV RBC AUTO: 96.1 FL (ref 81.4–97.8)
MONOCYTES # BLD AUTO: 0.94 K/UL (ref 0–0.85)
MONOCYTES NFR BLD AUTO: 8.8 % (ref 0–13.4)
NEUTROPHILS # BLD AUTO: 6.83 K/UL (ref 2–7.15)
NEUTROPHILS NFR BLD: 64 % (ref 44–72)
NRBC # BLD AUTO: 0 K/UL
NRBC BLD AUTO-RTO: 0 /100 WBC
PLATELET # BLD AUTO: 259 K/UL (ref 164–446)
PMV BLD AUTO: 9.9 FL (ref 9–12.9)
POTASSIUM SERPL-SCNC: 3.6 MMOL/L (ref 3.6–5.5)
POTASSIUM SERPL-SCNC: 3.8 MMOL/L (ref 3.6–5.5)
POTASSIUM SERPL-SCNC: 4.7 MMOL/L (ref 3.6–5.5)
PROT SERPL-MCNC: 5.7 G/DL (ref 6–8.2)
PROT SERPL-MCNC: 6 G/DL (ref 6–8.2)
PROTHROMBIN TIME: 17.1 SEC (ref 12–14.6)
RBC # BLD AUTO: 4.12 M/UL (ref 4.2–5.4)
SODIUM SERPL-SCNC: 138 MMOL/L (ref 135–145)
SODIUM SERPL-SCNC: 142 MMOL/L (ref 135–145)
WBC # BLD AUTO: 10.7 K/UL (ref 4.8–10.8)

## 2017-12-18 PROCEDURE — 700102 HCHG RX REV CODE 250 W/ 637 OVERRIDE(OP): Performed by: NURSE PRACTITIONER

## 2017-12-18 PROCEDURE — 770020 HCHG ROOM/CARE - TELE (206)

## 2017-12-18 PROCEDURE — 93010 ELECTROCARDIOGRAM REPORT: CPT | Performed by: INTERNAL MEDICINE

## 2017-12-18 PROCEDURE — 36415 COLL VENOUS BLD VENIPUNCTURE: CPT

## 2017-12-18 PROCEDURE — 93010 ELECTROCARDIOGRAM REPORT: CPT | Mod: 77 | Performed by: INTERNAL MEDICINE

## 2017-12-18 PROCEDURE — 85610 PROTHROMBIN TIME: CPT

## 2017-12-18 PROCEDURE — 700102 HCHG RX REV CODE 250 W/ 637 OVERRIDE(OP): Performed by: INTERNAL MEDICINE

## 2017-12-18 PROCEDURE — 84132 ASSAY OF SERUM POTASSIUM: CPT

## 2017-12-18 PROCEDURE — A9270 NON-COVERED ITEM OR SERVICE: HCPCS | Performed by: INTERNAL MEDICINE

## 2017-12-18 PROCEDURE — A9270 NON-COVERED ITEM OR SERVICE: HCPCS | Performed by: NURSE PRACTITIONER

## 2017-12-18 PROCEDURE — 700102 HCHG RX REV CODE 250 W/ 637 OVERRIDE(OP): Performed by: HOSPITALIST

## 2017-12-18 PROCEDURE — 80053 COMPREHEN METABOLIC PANEL: CPT

## 2017-12-18 PROCEDURE — 85025 COMPLETE CBC W/AUTO DIFF WBC: CPT

## 2017-12-18 PROCEDURE — 93005 ELECTROCARDIOGRAM TRACING: CPT | Performed by: HOSPITALIST

## 2017-12-18 PROCEDURE — A9270 NON-COVERED ITEM OR SERVICE: HCPCS | Performed by: HOSPITALIST

## 2017-12-18 PROCEDURE — 700111 HCHG RX REV CODE 636 W/ 250 OVERRIDE (IP): Performed by: NURSE PRACTITIONER

## 2017-12-18 PROCEDURE — 93005 ELECTROCARDIOGRAM TRACING: CPT | Performed by: INTERNAL MEDICINE

## 2017-12-18 PROCEDURE — 83735 ASSAY OF MAGNESIUM: CPT

## 2017-12-18 PROCEDURE — 99232 SBSQ HOSP IP/OBS MODERATE 35: CPT | Performed by: HOSPITALIST

## 2017-12-18 RX ORDER — ATENOLOL 25 MG/1
25 TABLET ORAL TWICE DAILY
Status: DISCONTINUED | OUTPATIENT
Start: 2017-12-18 | End: 2017-12-18

## 2017-12-18 RX ORDER — ATENOLOL 25 MG/1
25 TABLET ORAL
Status: DISCONTINUED | OUTPATIENT
Start: 2017-12-18 | End: 2017-12-19

## 2017-12-18 RX ORDER — MAGNESIUM SULFATE 1 G/100ML
1 INJECTION INTRAVENOUS ONCE
Status: COMPLETED | OUTPATIENT
Start: 2017-12-18 | End: 2017-12-18

## 2017-12-18 RX ORDER — SODIUM CHLORIDE 9 MG/ML
INJECTION, SOLUTION INTRAVENOUS
Status: ACTIVE
Start: 2017-12-18 | End: 2017-12-18

## 2017-12-18 RX ORDER — ASPIRIN 81 MG/1
81 TABLET, CHEWABLE ORAL DAILY
Status: DISCONTINUED | OUTPATIENT
Start: 2017-12-18 | End: 2017-12-22 | Stop reason: HOSPADM

## 2017-12-18 RX ORDER — WARFARIN SODIUM 5 MG/1
5 TABLET ORAL
Status: COMPLETED | OUTPATIENT
Start: 2017-12-18 | End: 2017-12-18

## 2017-12-18 RX ORDER — DOFETILIDE 0.5 MG/1
500 CAPSULE ORAL EVERY 12 HOURS
Status: DISCONTINUED | OUTPATIENT
Start: 2017-12-18 | End: 2017-12-19 | Stop reason: ALTCHOICE

## 2017-12-18 RX ADMIN — DOFETILIDE 500 MCG: 0.5 CAPSULE ORAL at 20:00

## 2017-12-18 RX ADMIN — MAGNESIUM SULFATE IN DEXTROSE 1 G: 10 INJECTION, SOLUTION INTRAVENOUS at 10:19

## 2017-12-18 RX ADMIN — DIGOXIN 125 MCG: 125 TABLET ORAL at 18:16

## 2017-12-18 RX ADMIN — ENOXAPARIN SODIUM 80 MG: 100 INJECTION SUBCUTANEOUS at 20:00

## 2017-12-18 RX ADMIN — ASPIRIN 81 MG: 81 TABLET, COATED ORAL at 08:23

## 2017-12-18 RX ADMIN — WARFARIN SODIUM 5 MG: 5 TABLET ORAL at 18:16

## 2017-12-18 RX ADMIN — POTASSIUM BICARBONATE 50 MEQ: 25 TABLET, EFFERVESCENT ORAL at 10:18

## 2017-12-18 RX ADMIN — VERAPAMIL HYDROCHLORIDE 180 MG: 120 TABLET, FILM COATED ORAL at 06:03

## 2017-12-18 RX ADMIN — ATENOLOL 25 MG: 25 TABLET ORAL at 16:06

## 2017-12-18 RX ADMIN — ENOXAPARIN SODIUM 80 MG: 100 INJECTION SUBCUTANEOUS at 13:02

## 2017-12-18 ASSESSMENT — PATIENT HEALTH QUESTIONNAIRE - PHQ9
SUM OF ALL RESPONSES TO PHQ QUESTIONS 1-9: 0
SUM OF ALL RESPONSES TO PHQ9 QUESTIONS 1 AND 2: 0
2. FEELING DOWN, DEPRESSED, IRRITABLE, OR HOPELESS: NOT AT ALL
1. LITTLE INTEREST OR PLEASURE IN DOING THINGS: NOT AT ALL

## 2017-12-18 ASSESSMENT — CHA2DS2 SCORE
DIABETES: NO
PRIOR STROKE OR TIA OR THROMBOEMBOLISM: NO
AGE 65 TO 74: NO
SEX: FEMALE
HYPERTENSION: YES
CHA2DS2 VASC SCORE: 5
CHF OR LEFT VENTRICULAR DYSFUNCTION: NO
AGE 75 OR GREATER: YES
VASCULAR DISEASE: YES

## 2017-12-18 ASSESSMENT — ENCOUNTER SYMPTOMS
SPEECH CHANGE: 0
WEAKNESS: 1
CLAUDICATION: 0
NERVOUS/ANXIOUS: 0
TREMORS: 0
CONSTIPATION: 0
DIZZINESS: 0
EYE PAIN: 0
PND: 0
HEADACHES: 0
BLOOD IN STOOL: 0
COUGH: 0
MYALGIAS: 0
FEVER: 0
ORTHOPNEA: 0
SENSORY CHANGE: 0
TINGLING: 0
SORE THROAT: 0
SPUTUM PRODUCTION: 0
PALPITATIONS: 0
HEARTBURN: 0
NECK PAIN: 0
PHOTOPHOBIA: 0
NAUSEA: 0
BACK PAIN: 0
DOUBLE VISION: 0
CHILLS: 0
DEPRESSION: 0
HEMOPTYSIS: 0
STRIDOR: 0
MEMORY LOSS: 0
SHORTNESS OF BREATH: 0
VOMITING: 0
BLURRED VISION: 0

## 2017-12-18 ASSESSMENT — COPD QUESTIONNAIRES
COPD SCREENING SCORE: 3
HAVE YOU SMOKED AT LEAST 100 CIGARETTES IN YOUR ENTIRE LIFE: NO/DON'T KNOW
DURING THE PAST 4 WEEKS HOW MUCH DID YOU FEEL SHORT OF BREATH: NONE/LITTLE OF THE TIME
DO YOU EVER COUGH UP ANY MUCUS OR PHLEGM?: NO/ONLY WITH OCCASIONAL COLDS OR INFECTIONS

## 2017-12-18 ASSESSMENT — PAIN SCALES - GENERAL
PAINLEVEL_OUTOF10: 0

## 2017-12-18 NOTE — DIETARY
"Nutrition Services: Pt seen for poor PO intake per nutrition admit screen.    Pt is a 78 yo F admitted for A-fib.  Past Medical History:   Diagnosis Date   • A-fib (CMS-HCC)    • Anesthesia     \"Tachycardia for 5 days after cataract surgery\"   • Anticoagulant long-term use 1/12/2012   • Arthritis     Knees, hips   • Asthma     with pneumonia, nothing for 3 years   • Atrial fibrillation (CMS-[facility])    • Backpain    • Breath shortness     with exertion O2  2l/m PRN, hasnt used for 3 years   • Bronchitis Nov, 2013   • CAD (coronary artery disease)    • Depression    • Glaucoma 5/3/2011   • Hematoma complicating a procedure 11/3/2012   • High cholesterol    • Hypertension    • Hypothyroid    • Lupus    • Macular degeneration    • Menopause 1/12/2012   • Mitral regurgitation 10/30/2012   • Obesity 1/12/2012   • Pneumonia feb,2013   • Pulmonary hypertension 10/30/2012   • PVC's (premature ventricular contractions) 1/12/2012   • Senile nuclear sclerosis    • Spinal stenosis of lumbar region at multiple levels    • Unspecified cataract     repaired bilateral   • Unspecified urinary incontinence      Reviewed H&P, which states that pt came in with c/o difficulty swallowing s/p a procedure that required intubation last Monday (12/11). Spoke with pt at bedside, pt appears to be well nourished. Pt states that she has a healthy appetite, however she strongly dislikes the pureed foods on the dysphagia 1 diet causing poor intake. Pt believes that her swallowing has improved and that she is able to tolerate solids. Informed pt that SLP must re-eval and verify that she is safe for solid foods - pt expressed understanding. Pt reports UBW = 185# which is fairly consistent with admit wt per stand up scale = 189#, no recent wt loss evidenced. Encouraged pt to increase PO intake on current diet to further strengthen swallow and to receive adequate nutrition.     Diet: Regular/Dysphagia 1/Thin Liquids/1:1 supervision; intake ranges from " 0-50% per ADL documentation  Wt: 86 kg (189#)  BMI: 32.54 (obese, class I)  Labs: glucose 111, creat 0.44, HbA1C 6.1  Meds: coumadin, bowel meds  GI: + BM today  Skin: no skin breakdown documented at this time    Plan/Recommend:  1. Encourage PO intake of meals   2. Nutrition Representative to see daily for meal preferences  3. Please record intake as percentage of meals consumed  4. SLP re-eval for diet advancement to increase PO intake  5. RD to monitor diet advancement, PO intake, wt trends, and nutrition labs/meds

## 2017-12-18 NOTE — PROGRESS NOTES
Inpatient Anticoagulation Service Note    Date: 12/18/2017  Reason for Anticoagulation: Atrial Fibrillation   [id number] VASc Score: 5    Hemoglobin Value: 12.8  Hematocrit Value: 39.6  Lab Platelet Value: 259  Target INR: 2.0 to 3.0    INR from last 7 days     Date/Time INR Value    12/18/17 1005 (!)  1.43    12/15/17 1350 (!)  2.6        Dose from last 7 days     Date/Time Dose (mg)    12/18/17 1100  5        Average Dose (mg):  (home dose: 3.75 mg MW, 2.5 mg AOD )  Significant Interactions: Aspirin  Bridge Therapy: Yes (lovenox 80 BID)  Comments: New admission for atrial fibrillation. Patient has known history, had a recent ablation but the a fib with RVR re-occurred. Plan to start tikosyn. INR is subtherapeutic, therapeutic lovenox initiated. Will give small bolus dose tonight. No S/S bleeding noted. Will continue to follow.    Plan:  5 mg tonight, INR tomorrow  Education Material Provided?: No (chronic warfarin patient )  Pharmacist suggested discharge dosing: likely home dose of 3.75 mg Mondays and Wednesdays, 2.5 mg all other days     Rosy Cummings, PHARMD

## 2017-12-18 NOTE — CONSULTS
DATE OF SERVICE:  12/18/2017    REASON FOR CONSULT:  Esophageal dysmotility and recurrent atrial arrhythmia.    HISTORY OF PRESENT ILLNESS:  This is a pleasant 77-year-old white female who   was admitted on 12/15/2017 with some difficulty swallowing and was noted be in   atrial fibrillation/atrial flutter.  She had no hemoptysis.  No TIA symptoms.    Previous history of esophageal dysmotility symptoms with difficulty   swallowing pills, just got worse after the ablation.  The patient had a   pulmonary vein isolation and a posterior wall isolation.  The patient came   back in atrial arrhythmia, was pretty much unaware of that and has gone back   into sinus rhythm, was not on antiarrhythmic medications secondary to multiple   drug intolerances.  The patient states currently the swallowing has improved.    She did have a barium swallow study, which showed no extravasation of the   barium, just showed dysmotility.    PAST MEDICAL HISTORY:  Includes atrial fibrillation.  History of mild   pulmonary hypertension, history of spinal stenosis, history of obesity.    History of arthritis, hypertension, hypothyroidism.  Previous moderate CAD   without any intervention.  Normal thallium in 2016.    PAST SURGICAL HISTORY:  Includes knee arthroscopy, total; cataract resection,   tonsillectomy.    FAMILY HISTORY:  History of stroke, diabetes and heart disease.    SOCIAL HISTORY:  She does not smoke.  Denies alcohol use.    ALLERGIES:  INTOLERANCE TO MULTIPLE MEDICATIONS INCLUDING AMIODARONE,   ATORVASTATIN, AUGMENTIN, BACTRIM, CIPRO, CLARITIN, CLOTRIMAZOLE, DILTIAZEM,   FLECAINIDE, KEFLEX, LIPITOR, METOPROLOL, MORPHINE, MUCINEX, QVAR, TAPE,   TRAMADOL, AND VIBRAMYCIN.    REVIEW OF SYSTEMS:  CONSTITUTIONAL:  No fevers, chills, or sweats.  Mild malaise.  ENT:  Negative except for the dysphasia.  EYES:  Negative.  CARDIOVASCULAR:  Negative.  GASTROINTESTINAL:  Negative.    Rest of review systems negative by the AMA/CMS  criteria.    CURRENT INPATIENT MEDICATIONS:  Include the following, the patient was on   verapamil.  This was discontinued.  We placed her on aspirin, low dose   atenolol, low dose digoxin, Tikosyn, magnesium, potassium chloride.    PHYSICAL EXAMINATION:  GENERAL:  This is a white female, in no acute distress.  VITAL SIGNS:  Blood pressure is 118/89, pulse is 70, respirations 16.  HEENT:  JVD 7 cm.  Carotids without bruits.  LUNGS:  Clear.  CARDIAC:  Sounds regular rate and rhythm, normal S1, S2, without murmurs or   gallops.  ABDOMEN:  Soft, nontender without hepatosplenomegaly.  EXTREMITIES:  Without clubbing, cyanosis or edema.  NEUROLOGIC:  Nonfocal.  PSYCHIATRIC:  Alert and oriented x3.  Mood and affect appropriate.  SKIN:  Turgor is normal.    DIAGNOSTIC DATA:  EKG when the patient was admitted showed atrial   fibrillation/atrial flutter with rapid ventricular response, more consistent   with atrial flutter.  Repeat EKG today shows sinus rhythm with a PVC.  QTC   interval of 400. Labs reviewed.    ASSESSMENT:  1.  Mild dysphagia with no evidence of any hemoptysis or neurologic findings.    Previous history of some dysmotility syndrome, probably somewhat worsened by   ablation of the vagus nerve.  Continue supportive care of this.  It appears to   be improving.  It should improve over the next couple of weeks.  2.  Recurrent atrial arrhythmias, recent ablation.  We will start the patient   on Tikosyn, watch the QTC.  Initial QTC was in the normal range. Start low dose atenolol. Some issues with toprol before.  3.  Anticoagulation, restart with lovenox.  4.  We will continue to follow this patient.       ____________________________________     CESAR MON MD    JANE / NTS    DD:  12/18/2017 09:41:36  DT:  12/18/2017 10:19:33    D#:  [id number]  Job#:  [id number]

## 2017-12-18 NOTE — PROGRESS NOTES
[facility]    Date of Service: 12/18/2017    Chief Complaint  77 y.o. female admitted 12/15/2017 with atrial fibrillation with RVR dysphagia.     Interval Problem Update  Patient is feeling much better, her HR is better controlled under 100. Patient denies fevers/chills, chest pain, shortness of breath or nausea/vommiting.   Continue verapamil gtt, cardiology following and titrating  Speech eval pending for dysphagia.  TSH depressed, .030, r/o thyroid storm? Checking Free T4.       12/17  No acute issues overnight, patient feels tired, but feels better, and reports less throat discomfort.  HR stable.  Continue PO verapamil, and digoxin. Titrate dose per cardiology.    12/18  Patient feeling better, still tired. Patient denies fevers/chills, chest pain, shortness of breath or nausea/vommiting.   Patient converted back and forth from sinus to AFib 106-130  Continue verapamil and digoxin.  EP to consult.    Consultants/Specialty  Cardiology  SLP    Disposition  TBD        Review of Systems   Constitutional: Positive for malaise/fatigue. Negative for chills and fever.   HENT: Negative for congestion, hearing loss, sore throat and tinnitus.    Eyes: Negative for blurred vision, double vision, photophobia and pain.   Respiratory: Negative for cough, hemoptysis, sputum production, shortness of breath and stridor.    Cardiovascular: Negative for chest pain, palpitations, orthopnea, claudication and PND.   Gastrointestinal: Negative for blood in stool, constipation, heartburn, melena, nausea and vomiting.   Genitourinary: Negative for dysuria, frequency and urgency.   Musculoskeletal: Negative for back pain, myalgias and neck pain.   Neurological: Positive for weakness. Negative for dizziness, tingling, tremors, sensory change, speech change and headaches.   Psychiatric/Behavioral: Negative for depression, memory loss and suicidal ideas. The patient is not nervous/anxious.       Physical Exam   Laboratory/Imaging   Hemodynamics  Temp (24hrs), Av.3 °C (97.4 °F), Min:36.1 °C (97 °F), Max:36.8 °C (98.2 °F)   Temperature: 36.2 °C (97.2 °F)  Pulse  Av.2  Min: 88  Max: 170   Blood Pressure : 118/89      Respiratory      Respiration: 17, Pulse Oximetry: 91 %     Work Of Breathing / Effort: Mild  RUL Breath Sounds: Diminished, RML Breath Sounds: Diminished, RLL Breath Sounds: Fine Crackles, DO Breath Sounds: Diminished, LLL Breath Sounds: Fine Crackles    Fluids    Intake/Output Summary (Last 24 hours) at 17 0831  Last data filed at 17 0618   Gross per 24 hour   Intake              915 ml   Output              550 ml   Net              365 ml       Nutrition  Orders Placed This Encounter   Procedures   • DIET ORDER     Standing Status:   Standing     Number of Occurrences:   1     Order Specific Question:   Diet:     Answer:   Regular [1]     Order Specific Question:   Texture/Fiber modifications:     Answer:   Dysphagia 1(Pureed)specify fluid consistency(question 6) [1]     Order Specific Question:   Consistency/Fluid modifications:     Answer:   Thin Liquids [3]     Order Specific Question:   Miscellaneous modifications:     Answer:   SLP - 1:1 Supervision by Nursing [21]     Physical Exam   Constitutional: She is oriented to person, place, and time. She appears well-developed and well-nourished. No distress.   HENT:   Head: Normocephalic and atraumatic.   Mouth/Throat: No oropharyngeal exudate.   Eyes: Conjunctivae are normal. Pupils are equal, round, and reactive to light. Right eye exhibits no discharge. No scleral icterus.   Neck: Neck supple. No JVD present. No thyromegaly present.   Cardiovascular: Normal rate and intact distal pulses.    No murmur heard.  Irregularly irregular.     Pulmonary/Chest: Effort normal and breath sounds normal. No stridor. No respiratory distress. She has no wheezes. She has no rales.   Abdominal: Soft. Bowel sounds are normal. She exhibits no distension.  There is no tenderness. There is no rebound.   Musculoskeletal: Normal range of motion. She exhibits no edema.   Neurological: She is alert and oriented to person, place, and time. No cranial nerve deficit.   Skin: Skin is warm. She is not diaphoretic. No erythema.   Psychiatric: She has a normal mood and affect. Her behavior is normal. Judgment and thought content normal.       Recent Labs      12/16/17 0421  12/17/17   0430  12/18/17   0324   WBC  10.0  8.7  10.7   RBC  3.98*  3.87*  4.12*   HEMOGLOBIN  12.8  12.3  12.8   HEMATOCRIT  38.9  37.8  39.6   MCV  97.7  97.7  96.1   MCH  32.2  31.8  31.1   MCHC  32.9*  32.5*  32.3*   RDW  50.8*  49.9  48.0   PLATELETCT  227  228  259   MPV  9.8  10.0  9.9     Recent Labs      12/16/17   0421  12/17/17   0430  12/18/17   0324   SODIUM  134*  136  138   POTASSIUM  3.7  3.4*  3.6   CHLORIDE  101  102  105   CO2  26  26  25   GLUCOSE  145*  94  110*   BUN  10  11  11   CREATININE  0.51  0.46*  0.35*   CALCIUM  8.6  8.8  8.8     Recent Labs      12/15/17   1350   APTT  31.8   INR  2.60*         Recent Labs      12/16/17   0421   TRIGLYCERIDE  139   HDL  45   LDL  67          Assessment/Plan     * Atrial fibrillation with rapid ventricular response (CMS-HCC)- (present on admission)   Assessment & Plan    Patient had history of afib, s/p ablation, however reoccurred.   Continue PO verapamil, and digoxin. Titrate dose per cardiology.  Continue coumadin, measure INR daily.  EP to consult.        Obesity (BMI 30-39.9)- (present on admission)   Assessment & Plan    encouraged diet and exercise        CAD (coronary artery disease)- (present on admission)   Assessment & Plan    Elevated troponins 2/2 to rapid RVR.  HR controlled  Patient denies chest pain.        HTN (hypertension)- (present on admission)   Assessment & Plan    Stable , held home dose of lasix, lisinoril will resume when medically stable         Hyperthyroidism- (present on admission)   Assessment & Plan    TSH  depressed, .030, Free T4 mildly elevated.  Previous TSH/Free T4 wnl.   Hold synthroid, will need lower dose and needs re-checking in 3-4 weeks.        Dysphagia- (present on admission)   Assessment & Plan    Difficulty swallowing occurred after ablation several days ago.  Barium swallow shows mod esophageal dysmotility.  Diet / Liquid Recommendation: Dysphagia I, Thin Liquid  SLP following.        Hypothyroidism- (present on admission)   Assessment & Plan    Stable holding medication for now.           Patient plan of care discussed at multidisplinary team rounds and with patient and R.N at beside.      Reviewed items::  Labs reviewed, Medications reviewed and EKG reviewed  Pruett catheter::  No Pruett  DVT prophylaxis pharmacological::  Warfarin (Coumadin)

## 2017-12-18 NOTE — CARE PLAN
Problem: Nutritional:  Goal: Achieve adequate nutritional intake  Patient will consume >50% of meals  Outcome: NOT MET  See dietary note.    RD following

## 2017-12-18 NOTE — CONSULTS
Full note dictated.  PAF/flutter post RFA.  Dysphagia improving.  Barium swallow otherwise ok except slow mobility.  Rec start tikosyn.  Get back on coumadin.  If INR low lovenox.  Dysmotility had previously probably worse with vagal ablation.

## 2017-12-18 NOTE — PROGRESS NOTES
Bedside report received and pt assessed. Pt up to bathroom and back to bed. FERRO noted, pt on 2L nasal cannula. No needs voiced at this time. Bed in lowest position and locked. Belongings and call light in reach. Safety maintained, will continue to monitor.

## 2017-12-18 NOTE — PROGRESS NOTES
MS: AFib 106-130    (.-08-)    R-PVC, R-Coup    Heart rate up to 150-160 but did not sustain.    1911 pt converted from SR to AFib.  0209 pt converted from AFib to SR.  0320 pt converted from SR to AFib.     Pt currently in AFib from 0320 until the remainder of shift.

## 2017-12-18 NOTE — PROGRESS NOTES
Ok to give atenolol per Dr. Matthews.  Nursing to monitor closely for reaction.  Verapamil stopped by Dr. Matthews, per him is ok to give tikosyn dose now since QTc is only 400 ms.

## 2017-12-19 LAB
ALBUMIN SERPL BCP-MCNC: 3.2 G/DL (ref 3.2–4.9)
ALBUMIN/GLOB SERPL: 1.1 G/DL
ALP SERPL-CCNC: 61 U/L (ref 30–99)
ALT SERPL-CCNC: 12 U/L (ref 2–50)
ANION GAP SERPL CALC-SCNC: 7 MMOL/L (ref 0–11.9)
AST SERPL-CCNC: 20 U/L (ref 12–45)
BASOPHILS # BLD AUTO: 0.5 % (ref 0–1.8)
BASOPHILS # BLD: 0.05 K/UL (ref 0–0.12)
BILIRUB SERPL-MCNC: 1.1 MG/DL (ref 0.1–1.5)
BUN SERPL-MCNC: 11 MG/DL (ref 8–22)
CALCIUM SERPL-MCNC: 8.9 MG/DL (ref 8.5–10.5)
CHLORIDE SERPL-SCNC: 102 MMOL/L (ref 96–112)
CO2 SERPL-SCNC: 28 MMOL/L (ref 20–33)
CREAT SERPL-MCNC: 0.56 MG/DL (ref 0.5–1.4)
EKG IMPRESSION: NORMAL
EOSINOPHIL # BLD AUTO: 0.56 K/UL (ref 0–0.51)
EOSINOPHIL NFR BLD: 5.1 % (ref 0–6.9)
ERYTHROCYTE [DISTWIDTH] IN BLOOD BY AUTOMATED COUNT: 48.6 FL (ref 35.9–50)
GFR SERPL CREATININE-BSD FRML MDRD: >60 ML/MIN/1.73 M 2
GLOBULIN SER CALC-MCNC: 2.8 G/DL (ref 1.9–3.5)
GLUCOSE SERPL-MCNC: 104 MG/DL (ref 65–99)
HCT VFR BLD AUTO: 41.7 % (ref 37–47)
HGB BLD-MCNC: 13.6 G/DL (ref 12–16)
IMM GRANULOCYTES # BLD AUTO: 0.12 K/UL (ref 0–0.11)
IMM GRANULOCYTES NFR BLD AUTO: 1.1 % (ref 0–0.9)
INR PPP: 1.44 (ref 0.87–1.13)
LYMPHOCYTES # BLD AUTO: 3.62 K/UL (ref 1–4.8)
LYMPHOCYTES NFR BLD: 32.7 % (ref 22–41)
MAGNESIUM SERPL-MCNC: 2.2 MG/DL (ref 1.5–2.5)
MCH RBC QN AUTO: 32.1 PG (ref 27–33)
MCHC RBC AUTO-ENTMCNC: 32.6 G/DL (ref 33.6–35)
MCV RBC AUTO: 98.3 FL (ref 81.4–97.8)
MONOCYTES # BLD AUTO: 1.12 K/UL (ref 0–0.85)
MONOCYTES NFR BLD AUTO: 10.1 % (ref 0–13.4)
NEUTROPHILS # BLD AUTO: 5.59 K/UL (ref 2–7.15)
NEUTROPHILS NFR BLD: 50.5 % (ref 44–72)
NRBC # BLD AUTO: 0 K/UL
NRBC BLD-RTO: 0 /100 WBC
PLATELET # BLD AUTO: 288 K/UL (ref 164–446)
PMV BLD AUTO: 10.1 FL (ref 9–12.9)
POTASSIUM SERPL-SCNC: 4.2 MMOL/L (ref 3.6–5.5)
PROT SERPL-MCNC: 6 G/DL (ref 6–8.2)
PROTHROMBIN TIME: 17.2 SEC (ref 12–14.6)
RBC # BLD AUTO: 4.24 M/UL (ref 4.2–5.4)
SODIUM SERPL-SCNC: 137 MMOL/L (ref 135–145)
WBC # BLD AUTO: 11.1 K/UL (ref 4.8–10.8)

## 2017-12-19 PROCEDURE — 85610 PROTHROMBIN TIME: CPT

## 2017-12-19 PROCEDURE — 83735 ASSAY OF MAGNESIUM: CPT

## 2017-12-19 PROCEDURE — A9270 NON-COVERED ITEM OR SERVICE: HCPCS | Performed by: NURSE PRACTITIONER

## 2017-12-19 PROCEDURE — 700102 HCHG RX REV CODE 250 W/ 637 OVERRIDE(OP): Performed by: NURSE PRACTITIONER

## 2017-12-19 PROCEDURE — 93005 ELECTROCARDIOGRAM TRACING: CPT | Performed by: HOSPITALIST

## 2017-12-19 PROCEDURE — 700102 HCHG RX REV CODE 250 W/ 637 OVERRIDE(OP): Performed by: INTERNAL MEDICINE

## 2017-12-19 PROCEDURE — A9270 NON-COVERED ITEM OR SERVICE: HCPCS | Performed by: HOSPITALIST

## 2017-12-19 PROCEDURE — 80053 COMPREHEN METABOLIC PANEL: CPT

## 2017-12-19 PROCEDURE — 93010 ELECTROCARDIOGRAM REPORT: CPT | Mod: 76 | Performed by: INTERNAL MEDICINE

## 2017-12-19 PROCEDURE — 36415 COLL VENOUS BLD VENIPUNCTURE: CPT

## 2017-12-19 PROCEDURE — 99232 SBSQ HOSP IP/OBS MODERATE 35: CPT | Performed by: HOSPITALIST

## 2017-12-19 PROCEDURE — 700102 HCHG RX REV CODE 250 W/ 637 OVERRIDE(OP): Performed by: HOSPITALIST

## 2017-12-19 PROCEDURE — 85025 COMPLETE CBC W/AUTO DIFF WBC: CPT

## 2017-12-19 PROCEDURE — 770020 HCHG ROOM/CARE - TELE (206)

## 2017-12-19 PROCEDURE — 700111 HCHG RX REV CODE 636 W/ 250 OVERRIDE (IP): Performed by: NURSE PRACTITIONER

## 2017-12-19 PROCEDURE — A9270 NON-COVERED ITEM OR SERVICE: HCPCS | Performed by: INTERNAL MEDICINE

## 2017-12-19 RX ORDER — ATENOLOL 25 MG/1
12.5 TABLET ORAL
Status: DISCONTINUED | OUTPATIENT
Start: 2017-12-20 | End: 2017-12-19

## 2017-12-19 RX ORDER — WARFARIN SODIUM 7.5 MG/1
3.75 TABLET ORAL
Status: DISCONTINUED | OUTPATIENT
Start: 2017-12-21 | End: 2017-12-21

## 2017-12-19 RX ORDER — WARFARIN SODIUM 2.5 MG/1
2.5 TABLET ORAL
Status: DISCONTINUED | OUTPATIENT
Start: 2017-12-19 | End: 2017-12-21

## 2017-12-19 RX ORDER — DOFETILIDE 0.5 MG/1
500 CAPSULE ORAL EVERY 12 HOURS
Status: DISCONTINUED | OUTPATIENT
Start: 2017-12-19 | End: 2017-12-19

## 2017-12-19 RX ORDER — DIGOXIN 125 MCG
125 TABLET ORAL DAILY
Status: DISCONTINUED | OUTPATIENT
Start: 2017-12-19 | End: 2017-12-22

## 2017-12-19 RX ORDER — DOFETILIDE 0.5 MG/1
500 CAPSULE ORAL EVERY 12 HOURS
Status: DISCONTINUED | OUTPATIENT
Start: 2017-12-19 | End: 2017-12-22 | Stop reason: HOSPADM

## 2017-12-19 RX ORDER — GUAIFENESIN/DEXTROMETHORPHAN 100-10MG/5
5 SYRUP ORAL EVERY 6 HOURS PRN
Status: DISCONTINUED | OUTPATIENT
Start: 2017-12-19 | End: 2017-12-22 | Stop reason: HOSPADM

## 2017-12-19 RX ADMIN — DOFETILIDE 500 MCG: 0.5 CAPSULE ORAL at 20:19

## 2017-12-19 RX ADMIN — ASPIRIN 81 MG: 81 TABLET, CHEWABLE ORAL at 09:30

## 2017-12-19 RX ADMIN — GUAIFENESIN AND DEXTROMETHORPHAN 5 ML: 100; 10 SYRUP ORAL at 22:32

## 2017-12-19 RX ADMIN — WARFARIN SODIUM 2.5 MG: 2.5 TABLET ORAL at 17:50

## 2017-12-19 RX ADMIN — STANDARDIZED SENNA CONCENTRATE AND DOCUSATE SODIUM 2 TABLET: 8.6; 5 TABLET, FILM COATED ORAL at 20:19

## 2017-12-19 RX ADMIN — ACETAMINOPHEN 650 MG: 325 TABLET, FILM COATED ORAL at 02:55

## 2017-12-19 RX ADMIN — ATENOLOL 25 MG: 25 TABLET ORAL at 09:35

## 2017-12-19 RX ADMIN — ENOXAPARIN SODIUM 80 MG: 100 INJECTION SUBCUTANEOUS at 20:19

## 2017-12-19 RX ADMIN — DOFETILIDE 500 MCG: 0.5 CAPSULE ORAL at 09:30

## 2017-12-19 RX ADMIN — ENOXAPARIN SODIUM 80 MG: 100 INJECTION SUBCUTANEOUS at 09:30

## 2017-12-19 RX ADMIN — GUAIFENESIN AND DEXTROMETHORPHAN 5 ML: 100; 10 SYRUP ORAL at 03:54

## 2017-12-19 RX ADMIN — GUAIFENESIN AND DEXTROMETHORPHAN 5 ML: 100; 10 SYRUP ORAL at 12:56

## 2017-12-19 ASSESSMENT — ENCOUNTER SYMPTOMS
SPEECH CHANGE: 0
DEPRESSION: 0
ORTHOPNEA: 0
BACK PAIN: 0
HEMOPTYSIS: 0
CONSTIPATION: 0
SHORTNESS OF BREATH: 0
HEARTBURN: 0
BLURRED VISION: 0
INSOMNIA: 0
NECK PAIN: 0
COUGH: 1
WHEEZING: 0
VOMITING: 0
STRIDOR: 0
SPUTUM PRODUCTION: 0
MEMORY LOSS: 0
DIAPHORESIS: 0
HEADACHES: 0
CHILLS: 0
DOUBLE VISION: 0
MYALGIAS: 0
DIARRHEA: 0
FEVER: 0
TINGLING: 0
SORE THROAT: 0
PND: 0
SINUS PAIN: 0
BLOOD IN STOOL: 0
FOCAL WEAKNESS: 0
WEAKNESS: 0
CLAUDICATION: 0
NAUSEA: 0
WEAKNESS: 1
TREMORS: 0
DIZZINESS: 0
PHOTOPHOBIA: 0
NERVOUS/ANXIOUS: 0
SENSORY CHANGE: 0
PALPITATIONS: 0
ABDOMINAL PAIN: 0
COUGH: 0
EYE PAIN: 0

## 2017-12-19 ASSESSMENT — PAIN SCALES - GENERAL
PAINLEVEL_OUTOF10: 0
PAINLEVEL_OUTOF10: 3
PAINLEVEL_OUTOF10: 0

## 2017-12-19 NOTE — PROGRESS NOTES
Cardiology Progress Note               Author: Leandra SUSI Lubineliceo Date & Time created: 12/19/2017  8:34 AM     Interval History:    Patient seen on EPS rounds.  Afebrile  SB high 50's/SR 60's on tele.  's-140's overnight  No adverse reaction to atenolol, denies throat swelling  Reports improvement in dysphagia  GFR > 60, K 4.2, Mg 2.2  INR 1.44 this morning  Dry cough, relieved with robitussin  1 LPM O2 NC   ms after 1st tikosyn dose  Feels tired, thinks it's related to the robitussin    Review of Systems   Constitutional: Negative for chills, diaphoresis and fever.        Feels sleepy   HENT: Negative for congestion, nosebleeds, sinus pain and sore throat.    Respiratory: Positive for cough. Negative for hemoptysis, sputum production, shortness of breath, wheezing and stridor.         Nonproductive cough   Cardiovascular: Negative for chest pain, palpitations, orthopnea, leg swelling and PND.   Gastrointestinal: Negative for abdominal pain, blood in stool, constipation, diarrhea, melena, nausea and vomiting.   Genitourinary: Negative for dysuria, frequency, hematuria and urgency.   Skin: Negative for itching and rash.   Neurological: Negative for dizziness, tingling, sensory change, speech change, focal weakness and weakness.   Psychiatric/Behavioral: Negative for depression. The patient is not nervous/anxious and does not have insomnia.      Physical Exam   Constitutional: She is oriented to person, place, and time. She appears well-developed and well-nourished. No distress.   HENT:   Head: Normocephalic and atraumatic.   Right Ear: External ear normal.   Left Ear: External ear normal.   Eyes: EOM are normal. Pupils are equal, round, and reactive to light. Right eye exhibits no discharge. Left eye exhibits no discharge. No scleral icterus.   Neck: Normal range of motion. Neck supple. No JVD present.   Cardiovascular: Regular rhythm and intact distal pulses.  Bradycardia present.  Exam reveals distant  heart sounds. Exam reveals no gallop and no friction rub.    No murmur heard.  SB/SR on tele   Pulmonary/Chest: Effort normal and breath sounds normal. No stridor. No respiratory distress. She has no wheezes. She has no rales.   Abdominal: Soft. Bowel sounds are normal. She exhibits no distension. There is no tenderness. There is no rebound and no guarding.   Musculoskeletal: Normal range of motion. She exhibits no edema.   Neurological: She is alert and oriented to person, place, and time.   Skin: Skin is warm and dry. No rash noted. She is not diaphoretic. No erythema. No pallor.   Psychiatric: She has a normal mood and affect. Her behavior is normal. Judgment and thought content normal.   Nursing note and vitals reviewed.    Hemodynamics:  Temp (24hrs), Av.3 °C (97.3 °F), Min:36 °C (96.8 °F), Max:36.4 °C (97.6 °F)  Temperature: 36.2 °C (97.2 °F)  Pulse  Av.6  Min: 67  Max: 170  Blood Pressure : 144/78       Respiratory:  Respiration: 18, Pulse Oximetry: 93 %  Work Of Breathing / Effort: Mild  RUL Breath Sounds: Diminished, RML Breath Sounds: Diminished, RLL Breath Sounds: Fine Crackles, DO Breath Sounds: Diminished, LLL Breath Sounds: Fine Crackles    Fluids:  Weight: 81.9 kg (180 lb 8.9 oz)    GI/Nutrition:  Orders Placed This Encounter   Procedures   • DIET ORDER     Standing Status:   Standing     Number of Occurrences:   1     Order Specific Question:   Diet:     Answer:   Regular [1]     Order Specific Question:   Texture/Fiber modifications:     Answer:   Dysphagia 1(Pureed)specify fluid consistency(question 6) [1]     Order Specific Question:   Consistency/Fluid modifications:     Answer:   Thin Liquids [3]     Order Specific Question:   Miscellaneous modifications:     Answer:   SLP - 1:1 Supervision by Nursing [21]     Lab Results:  Recent Labs      17   0430  17   0324  17   0305   WBC  8.7  10.7  11.1*   RBC  3.87*  4.12*  4.24   HEMOGLOBIN  12.3  12.8  13.6   HEMATOCRIT   37.8  39.6  41.7   MCV  97.7  96.1  98.3*   MCH  31.8  31.1  32.1   MCHC  32.5*  32.3*  32.6*   RDW  49.9  48.0  48.6   PLATELETCT  228  259  288   MPV  10.0  9.9  10.1     Recent Labs      12/18/17   0324  12/18/17   0917  12/18/17   1350  12/19/17   0305   SODIUM  138  142   --   137   POTASSIUM  3.6  3.8  4.7  4.2   CHLORIDE  105  102   --   102   CO2  25  30   --   28   GLUCOSE  110*  111*   --   104*   BUN  11  11   --   11   CREATININE  0.35*  0.44*   --   0.56   CALCIUM  8.8  9.0   --   8.9     Recent Labs      12/18/17   1005  12/19/17   0305   INR  1.43*  1.44*     Medical Decision Making, by Problem:  Active Hospital Problems    Diagnosis   • *Atrial fibrillation with rapid ventricular response (CMS-HCC) [I48.91]   • Obesity (BMI 30-39.9) [E66.9]   • CAD (coronary artery disease) [I25.10]   • HTN (hypertension) [I10]   • Hypothyroidism [E03.9]   • Hyperthyroidism [E05.90]   • Dysphagia [R13.10]     Plan:    Continue tikosyn at 500 mcg dose, EKG's 2 hours after each dose    Discontinued digoxin since HR 50-60's.    Continue coumadin per pharmacy, bridging with lovenox.    BMP & Mg tomorrow    Anticipate home Thursday morning if no complications with the tikosyn.        Reviewed items::  EKG reviewed, Radiology images reviewed, Labs reviewed and Medications reviewed  Pruett catheter::  No Pruett  DVT prophylaxis pharmacological::  Enoxaparin (Lovenox) and Warfarin (Coumadin)

## 2017-12-19 NOTE — PROGRESS NOTES
Inpatient Anticoagulation Service Note    Date: 12/19/2017  Reason for Anticoagulation: Atrial Fibrillation   [id number] VASc Score: 5    Hemoglobin Value: 13.6  Hematocrit Value: 41.7  Lab Platelet Value: 288  Target INR: 2.0 to 3.0    INR from last 7 days     Date/Time INR Value    12/19/17 0305 (!)  1.44    12/18/17 1005 (!)  1.43    12/15/17 1350 (!)  2.6        Dose from last 7 days     Date/Time Dose (mg)    12/19/17 0800  2.5    12/18/17 1100  5        Average Dose (mg):  (home dose: 3.75 mg MW, 2.5 mg AOD )  Significant Interactions: Aspirin  Bridge Therapy: Yes     HPI: 76 yo female admitted 12/15/17 for difficulty swallowing s/p ablation on Monday 12/11/17 whereby Afib RVR re-occurred. Cardiac EP initiated Tikosyn yesterday (12/18). Patient is chronically anticoagulated outpatient with warfarin therapy for h/o Afib. Warfarin managed by the [facility] and per records, patient is prescribed warfarin 3.75mg (one + one-half tablet) po every Monday and Thursday and warfarin 2.5mg (one tablet) po on all other days. Recent INRs on file from [facility] within therapeutic range on this regimen. No new potential drug-drug interactions identified. Patient also takes aspirin for CAD - established interaction. Bridging back to therapeutic INR with therapeutic lovenox.     Assessment: INR remain SUBtherapeutic following a dose of warfarin 5mg po x one last night. Will continue to have an affect on INR downstream.     Plan: Initiate usual home dosing regimen with continued INR monitoring until re-stabilized on warfarin regimen. Lovenox may be discontinued when INR returns to goal range.     Education Material Provided?: No (Chronic warfarin patient)    Pharmacist suggested discharge dosing: warfarin 3.75mg (one + one-half tablet) po every Monday and Thursday and warfarin 2.5mg (one tablet) po on all other days with f/u appointment with the [facility].      Alem La, PharmD

## 2017-12-19 NOTE — PROGRESS NOTES
Patient resting, repositioned. Bed low locked, SRx2, call bell within reach, Non skid socks on, Bed alarm activated

## 2017-12-19 NOTE — PROGRESS NOTES
Bedside report received and pt assessed. No acute s/s of distress noted. VSS. No needs voiced at this time. Bed in lowest position and locked. Belongings and call light in reach. Safety maintained, will continue to monitor.

## 2017-12-19 NOTE — CARE PLAN
Problem: Safety  Goal: Will remain free from injury  Outcome: PROGRESSING AS EXPECTED  No falls or injury during shift    Problem: Infection  Goal: Will remain free from infection  Outcome: PROGRESSING AS EXPECTED  No new signs or symptoms of infection noted during shift

## 2017-12-19 NOTE — CARE PLAN
Problem: Safety  Goal: Will remain free from falls    Intervention: Implement fall precautions  Bed in lowest position and locked, upper side rail sup x2, treaded socks on, and bed alarm on. Personal belongings and call light in reach. Safety ongoing.       Problem: Knowledge Deficit  Goal: Knowledge of disease process/condition, treatment plan, diagnostic tests, and medications will improve    Intervention: Assess knowledge level of disease process/condition, treatment plan, diagnostic tests, and medications  Plan of care and medication administration discussed with pt.       Problem: Pain Management  Goal: Pain level will decrease to patient's comfort goal    Intervention: Follow pain managment plan developed in collaboration with patient and Interdisciplinary Team  Pt denies any pain, encouraged pt to notify staff of any pain before it becomes too severe.

## 2017-12-19 NOTE — PROGRESS NOTES
MS: AFib/ Accelerated Junctional/ SR     (.-08-)    R-Pvc, R-Coup    In and out of SR, AFib, and Junctional.

## 2017-12-19 NOTE — PROGRESS NOTES
[facility] Hospitalist Progress Note    Date of Service: 12/19/2017    Chief Complaint  77 y.o. female admitted 12/15/2017 with atrial fibrillation with RVR dysphagia.     Interval Problem Update  Patient is feeling much better, her HR is better controlled under 100. Patient denies fevers/chills, chest pain, shortness of breath or nausea/vommiting.   Continue verapamil gtt, cardiology following and titrating  Speech eval pending for dysphagia.  TSH depressed, .030, r/o thyroid storm? Checking Free T4.       12/17  No acute issues overnight, patient feels tired, but feels better, and reports less throat discomfort.  HR stable.  Continue PO verapamil, and digoxin. Titrate dose per cardiology.    12/18  Patient feeling better, still tired. Patient denies fevers/chills, chest pain, shortness of breath or nausea/vommiting.   Patient converted back and forth from sinus to AFib 106-130  Continue verapamil and digoxin.  EP to consult.    12/19  Patient doing well, says her soreness in her throat is improving.  Pending SLP re-revaluation.  Tikosyn was started yesterday, continue to monitor EKG qt/qtc.  Verapamil stopped    Consultants/Specialty  Cardiology  SLP    Disposition  TBD        Review of Systems   Constitutional: Positive for malaise/fatigue. Negative for chills and fever.   HENT: Negative for congestion, hearing loss, sore throat and tinnitus.    Eyes: Negative for blurred vision, double vision, photophobia and pain.   Respiratory: Negative for cough, hemoptysis, sputum production, shortness of breath and stridor.    Cardiovascular: Negative for chest pain, palpitations, orthopnea, claudication and PND.   Gastrointestinal: Negative for blood in stool, constipation, heartburn, melena, nausea and vomiting.   Genitourinary: Negative for dysuria, frequency and urgency.   Musculoskeletal: Negative for back pain, myalgias and neck pain.   Neurological: Positive for weakness. Negative for dizziness, tingling, tremors,  sensory change, speech change and headaches.   Psychiatric/Behavioral: Negative for depression, memory loss and suicidal ideas. The patient is not nervous/anxious.       Physical Exam  Laboratory/Imaging   Hemodynamics  Temp (24hrs), Av.3 °C (97.3 °F), Min:36 °C (96.8 °F), Max:36.4 °C (97.6 °F)   Temperature: 36.2 °C (97.2 °F)  Pulse  Av.6  Min: 67  Max: 170   Blood Pressure : 144/78      Respiratory      Respiration: 18, Pulse Oximetry: 93 %     Work Of Breathing / Effort: Mild  RUL Breath Sounds: Diminished, RML Breath Sounds: Diminished, RLL Breath Sounds: Fine Crackles, DO Breath Sounds: Diminished, LLL Breath Sounds: Fine Crackles    Fluids    Intake/Output Summary (Last 24 hours) at 17 0823  Last data filed at 17 0300   Gross per 24 hour   Intake              340 ml   Output              300 ml   Net               40 ml       Nutrition  Orders Placed This Encounter   Procedures   • DIET ORDER     Standing Status:   Standing     Number of Occurrences:   1     Order Specific Question:   Diet:     Answer:   Regular [1]     Order Specific Question:   Texture/Fiber modifications:     Answer:   Dysphagia 1(Pureed)specify fluid consistency(question 6) [1]     Order Specific Question:   Consistency/Fluid modifications:     Answer:   Thin Liquids [3]     Order Specific Question:   Miscellaneous modifications:     Answer:   SLP - 1:1 Supervision by Nursing [21]     Physical Exam   Constitutional: She is oriented to person, place, and time. She appears well-developed and well-nourished. No distress.   HENT:   Head: Normocephalic and atraumatic.   Mouth/Throat: No oropharyngeal exudate.   Eyes: Conjunctivae are normal. Pupils are equal, round, and reactive to light. Right eye exhibits no discharge. No scleral icterus.   Neck: Neck supple. No JVD present. No thyromegaly present.   Cardiovascular: Normal rate and intact distal pulses.    No murmur heard.  Irregularly irregular.     Pulmonary/Chest:  Effort normal and breath sounds normal. No stridor. No respiratory distress. She has no wheezes. She has no rales.   Abdominal: Soft. Bowel sounds are normal. She exhibits no distension. There is no tenderness. There is no rebound.   Musculoskeletal: Normal range of motion. She exhibits no edema.   Neurological: She is alert and oriented to person, place, and time. No cranial nerve deficit.   Skin: Skin is warm. She is not diaphoretic. No erythema.   Psychiatric: She has a normal mood and affect. Her behavior is normal. Judgment and thought content normal.       Recent Labs      12/17/17   0430  12/18/17   0324  12/19/17   0305   WBC  8.7  10.7  11.1*   RBC  3.87*  4.12*  4.24   HEMOGLOBIN  12.3  12.8  13.6   HEMATOCRIT  37.8  39.6  41.7   MCV  97.7  96.1  98.3*   MCH  31.8  31.1  32.1   MCHC  32.5*  32.3*  32.6*   RDW  49.9  48.0  48.6   PLATELETCT  228  259  288   MPV  10.0  9.9  10.1     Recent Labs      12/18/17   0324  12/18/17   0917  12/18/17   1350  12/19/17   0305   SODIUM  138  142   --   137   POTASSIUM  3.6  3.8  4.7  4.2   CHLORIDE  105  102   --   102   CO2  25  30   --   28   GLUCOSE  110*  111*   --   104*   BUN  11  11   --   11   CREATININE  0.35*  0.44*   --   0.56   CALCIUM  8.8  9.0   --   8.9     Recent Labs      12/18/17   1005  12/19/17   0305   INR  1.43*  1.44*                  Assessment/Plan     * Atrial fibrillation with rapid ventricular response (CMS-HCC)- (present on admission)   Assessment & Plan    Patient had history of afib, s/p ablation, however reoccurred.   Continue PO verapamil, and digoxin. Titrate dose per cardiology.  Continue coumadin, measure INR daily.  EP consulted, recommended Tikosyn started last night , awaiting for further recommendations        Obesity (BMI 30-39.9)- (present on admission)   Assessment & Plan    encouraged diet and exercise        CAD (coronary artery disease)- (present on admission)   Assessment & Plan    Elevated troponins 2/2 to rapid RVR.  HR  controlled  Patient denies chest pain.        HTN (hypertension)- (present on admission)   Assessment & Plan    Stable , held home dose of lasix, lisinoril will resume when medically stable         Hyperthyroidism- (present on admission)   Assessment & Plan    TSH depressed, .030, Free T4 mildly elevated.  Previous TSH/Free T4 wnl.   Hold synthroid, will need lower dose and needs re-checking in 3-4 weeks.        Dysphagia- (present on admission)   Assessment & Plan    Difficulty swallowing occurred after ablation several days ago.  Barium swallow shows mod esophageal dysmotility.  Diet / Liquid Recommendation: Dysphagia I, Thin Liquid  SLP re-revaluation pending         Hypothyroidism- (present on admission)   Assessment & Plan    Stable holding medication for now.           Patient plan of care discussed at multidisplinary team rounds and with patient and R.N at beside.      Reviewed items::  Labs reviewed, Medications reviewed and EKG reviewed  Pruett catheter::  No Pruett  DVT prophylaxis pharmacological::  Warfarin (Coumadin)

## 2017-12-20 ENCOUNTER — APPOINTMENT (OUTPATIENT)
Dept: RADIOLOGY | Facility: MEDICAL CENTER | Age: 77
DRG: 274 | End: 2017-12-20
Attending: NURSE PRACTITIONER
Payer: MEDICARE

## 2017-12-20 LAB
ANION GAP SERPL CALC-SCNC: 7 MMOL/L (ref 0–11.9)
BASOPHILS # BLD AUTO: 0.6 % (ref 0–1.8)
BASOPHILS # BLD: 0.07 K/UL (ref 0–0.12)
BUN SERPL-MCNC: 9 MG/DL (ref 8–22)
CALCIUM SERPL-MCNC: 8.7 MG/DL (ref 8.5–10.5)
CHLORIDE SERPL-SCNC: 101 MMOL/L (ref 96–112)
CO2 SERPL-SCNC: 29 MMOL/L (ref 20–33)
CREAT SERPL-MCNC: 0.45 MG/DL (ref 0.5–1.4)
EKG IMPRESSION: NORMAL
EKG IMPRESSION: NORMAL
EOSINOPHIL # BLD AUTO: 0.36 K/UL (ref 0–0.51)
EOSINOPHIL NFR BLD: 2.8 % (ref 0–6.9)
ERYTHROCYTE [DISTWIDTH] IN BLOOD BY AUTOMATED COUNT: 47.5 FL (ref 35.9–50)
GFR SERPL CREATININE-BSD FRML MDRD: >60 ML/MIN/1.73 M 2
GLUCOSE SERPL-MCNC: 178 MG/DL (ref 65–99)
HCT VFR BLD AUTO: 44 % (ref 37–47)
HGB BLD-MCNC: 14.2 G/DL (ref 12–16)
IMM GRANULOCYTES # BLD AUTO: 0.23 K/UL (ref 0–0.11)
IMM GRANULOCYTES NFR BLD AUTO: 1.8 % (ref 0–0.9)
INR PPP: 1.77 (ref 0.87–1.13)
LYMPHOCYTES # BLD AUTO: 3.15 K/UL (ref 1–4.8)
LYMPHOCYTES NFR BLD: 24.8 % (ref 22–41)
MAGNESIUM SERPL-MCNC: 1.7 MG/DL (ref 1.5–2.5)
MCH RBC QN AUTO: 31.2 PG (ref 27–33)
MCHC RBC AUTO-ENTMCNC: 32.3 G/DL (ref 33.6–35)
MCV RBC AUTO: 96.7 FL (ref 81.4–97.8)
MONOCYTES # BLD AUTO: 0.95 K/UL (ref 0–0.85)
MONOCYTES NFR BLD AUTO: 7.5 % (ref 0–13.4)
NEUTROPHILS # BLD AUTO: 7.95 K/UL (ref 2–7.15)
NEUTROPHILS NFR BLD: 62.5 % (ref 44–72)
NRBC # BLD AUTO: 0 K/UL
NRBC BLD-RTO: 0 /100 WBC
PLATELET # BLD AUTO: 332 K/UL (ref 164–446)
PMV BLD AUTO: 9.6 FL (ref 9–12.9)
POTASSIUM SERPL-SCNC: 3.8 MMOL/L (ref 3.6–5.5)
PROTHROMBIN TIME: 20.3 SEC (ref 12–14.6)
RBC # BLD AUTO: 4.55 M/UL (ref 4.2–5.4)
SODIUM SERPL-SCNC: 137 MMOL/L (ref 135–145)
WBC # BLD AUTO: 12.7 K/UL (ref 4.8–10.8)

## 2017-12-20 PROCEDURE — 85025 COMPLETE CBC W/AUTO DIFF WBC: CPT

## 2017-12-20 PROCEDURE — 770020 HCHG ROOM/CARE - TELE (206)

## 2017-12-20 PROCEDURE — 700102 HCHG RX REV CODE 250 W/ 637 OVERRIDE(OP): Performed by: INTERNAL MEDICINE

## 2017-12-20 PROCEDURE — 80048 BASIC METABOLIC PNL TOTAL CA: CPT

## 2017-12-20 PROCEDURE — 93005 ELECTROCARDIOGRAM TRACING: CPT | Performed by: HOSPITALIST

## 2017-12-20 PROCEDURE — 93010 ELECTROCARDIOGRAM REPORT: CPT | Mod: 77 | Performed by: INTERNAL MEDICINE

## 2017-12-20 PROCEDURE — 700102 HCHG RX REV CODE 250 W/ 637 OVERRIDE(OP): Performed by: HOSPITALIST

## 2017-12-20 PROCEDURE — 99232 SBSQ HOSP IP/OBS MODERATE 35: CPT | Performed by: HOSPITALIST

## 2017-12-20 PROCEDURE — 85610 PROTHROMBIN TIME: CPT

## 2017-12-20 PROCEDURE — 700111 HCHG RX REV CODE 636 W/ 250 OVERRIDE (IP): Performed by: NURSE PRACTITIONER

## 2017-12-20 PROCEDURE — 93010 ELECTROCARDIOGRAM REPORT: CPT | Performed by: INTERNAL MEDICINE

## 2017-12-20 PROCEDURE — 700102 HCHG RX REV CODE 250 W/ 637 OVERRIDE(OP): Performed by: NURSE PRACTITIONER

## 2017-12-20 PROCEDURE — A9270 NON-COVERED ITEM OR SERVICE: HCPCS | Performed by: INTERNAL MEDICINE

## 2017-12-20 PROCEDURE — A9270 NON-COVERED ITEM OR SERVICE: HCPCS | Performed by: HOSPITALIST

## 2017-12-20 PROCEDURE — 71020 DX-CHEST-2 VIEWS: CPT

## 2017-12-20 PROCEDURE — A9270 NON-COVERED ITEM OR SERVICE: HCPCS | Performed by: NURSE PRACTITIONER

## 2017-12-20 PROCEDURE — 36415 COLL VENOUS BLD VENIPUNCTURE: CPT

## 2017-12-20 PROCEDURE — 83735 ASSAY OF MAGNESIUM: CPT

## 2017-12-20 RX ORDER — MAGNESIUM SULFATE HEPTAHYDRATE 40 MG/ML
2 INJECTION, SOLUTION INTRAVENOUS ONCE
Status: COMPLETED | OUTPATIENT
Start: 2017-12-20 | End: 2017-12-20

## 2017-12-20 RX ORDER — BENZONATATE 100 MG/1
100 CAPSULE ORAL 3 TIMES DAILY PRN
Status: DISCONTINUED | OUTPATIENT
Start: 2017-12-20 | End: 2017-12-22 | Stop reason: HOSPADM

## 2017-12-20 RX ORDER — LISINOPRIL 5 MG/1
5 TABLET ORAL
Status: DISCONTINUED | OUTPATIENT
Start: 2017-12-20 | End: 2017-12-22 | Stop reason: HOSPADM

## 2017-12-20 RX ORDER — POTASSIUM CHLORIDE 20 MEQ/1
20 TABLET, EXTENDED RELEASE ORAL DAILY
Status: DISCONTINUED | OUTPATIENT
Start: 2017-12-20 | End: 2017-12-22 | Stop reason: HOSPADM

## 2017-12-20 RX ADMIN — GUAIFENESIN AND DEXTROMETHORPHAN 5 ML: 100; 10 SYRUP ORAL at 11:57

## 2017-12-20 RX ADMIN — WARFARIN SODIUM 2.5 MG: 2.5 TABLET ORAL at 18:41

## 2017-12-20 RX ADMIN — LISINOPRIL 5 MG: 5 TABLET ORAL at 08:57

## 2017-12-20 RX ADMIN — MAGNESIUM SULFATE IN WATER 2 G: 40 INJECTION, SOLUTION INTRAVENOUS at 12:09

## 2017-12-20 RX ADMIN — POTASSIUM CHLORIDE 20 MEQ: 1500 TABLET, EXTENDED RELEASE ORAL at 11:57

## 2017-12-20 RX ADMIN — ENOXAPARIN SODIUM 80 MG: 100 INJECTION SUBCUTANEOUS at 08:57

## 2017-12-20 RX ADMIN — DIGOXIN 125 MCG: 125 TABLET ORAL at 18:41

## 2017-12-20 RX ADMIN — GUAIFENESIN AND DEXTROMETHORPHAN 5 ML: 100; 10 SYRUP ORAL at 23:12

## 2017-12-20 RX ADMIN — ASPIRIN 81 MG: 81 TABLET, CHEWABLE ORAL at 08:57

## 2017-12-20 RX ADMIN — BENZONATATE 100 MG: 100 CAPSULE ORAL at 20:18

## 2017-12-20 RX ADMIN — DOFETILIDE 500 MCG: 0.5 CAPSULE ORAL at 20:18

## 2017-12-20 RX ADMIN — ENOXAPARIN SODIUM 80 MG: 100 INJECTION SUBCUTANEOUS at 20:18

## 2017-12-20 RX ADMIN — DOFETILIDE 500 MCG: 0.5 CAPSULE ORAL at 08:56

## 2017-12-20 ASSESSMENT — ENCOUNTER SYMPTOMS
SENSORY CHANGE: 0
COUGH: 1
FEVER: 0
ABDOMINAL PAIN: 0
TINGLING: 0
SORE THROAT: 0
SHORTNESS OF BREATH: 1
PHOTOPHOBIA: 0
DIZZINESS: 0
FOCAL WEAKNESS: 0
NECK PAIN: 0
BACK PAIN: 0
SINUS PAIN: 0
CLAUDICATION: 0
SPEECH CHANGE: 0
STRIDOR: 0
WHEEZING: 0
MYALGIAS: 0
HEADACHES: 0
BLURRED VISION: 0
WEAKNESS: 1
DEPRESSION: 0
BRUISES/BLEEDS EASILY: 1
EYE PAIN: 0
NERVOUS/ANXIOUS: 0
CONSTIPATION: 0
VOMITING: 0
SPUTUM PRODUCTION: 0
SHORTNESS OF BREATH: 0
DIARRHEA: 0
NAUSEA: 0
COUGH: 0
PND: 0
PALPITATIONS: 0
DOUBLE VISION: 0
MEMORY LOSS: 0
ORTHOPNEA: 0
CHILLS: 0
HEARTBURN: 0
INSOMNIA: 1
TREMORS: 0
BLOOD IN STOOL: 0
HEMOPTYSIS: 0
DIAPHORESIS: 0

## 2017-12-20 ASSESSMENT — PAIN SCALES - GENERAL
PAINLEVEL_OUTOF10: 0

## 2017-12-20 ASSESSMENT — ACTIVITIES OF DAILY LIVING (ADL): TOILETING: INDEPENDENT

## 2017-12-20 ASSESSMENT — LIFESTYLE VARIABLES: DO YOU DRINK ALCOHOL: NO

## 2017-12-20 NOTE — PROGRESS NOTES
Cardiology Progress Note               Author: Leandra SUSI Lubineliceo Date & Time created: 12/20/2017  10:35 AM     Interval History:    Patient seen on EPS rounds.  Afebrile  SR 60-70's on tele overnight but converted to AF at 08:57 rate 130-170's  's-160's overnight  Worsening cough, nonproductive; still on 1 LPM O2 NC  Reports her swallowing is back to normal, has been eating a cardiac diet since last night  Mg 1.7, K 3.8, GFR > 60  INR up to 1.77 this morning    QTC trends are listed below & are by my calculation:  350 ms after 1st dose (in SR)  410 ms after 2nd dose (in SR)  399 ms after 3rd dose (in SR)  Appox. 417 ms after 4th dose (difficult to assess, in AF rate 133)      Review of Systems   Constitutional: Negative for chills, diaphoresis and fever.   HENT: Negative for congestion, nosebleeds, sinus pain and sore throat.    Respiratory: Positive for cough and shortness of breath. Negative for hemoptysis, sputum production, wheezing and stridor.         Nonproductive cough, worse overnight  + FERRO   Cardiovascular: Negative for chest pain, palpitations, orthopnea, leg swelling and PND.   Gastrointestinal: Negative for abdominal pain, blood in stool, constipation, diarrhea, melena, nausea and vomiting.   Genitourinary: Negative for dysuria, frequency, hematuria and urgency.   Skin: Negative for itching and rash.   Neurological: Positive for weakness. Negative for dizziness, tingling, sensory change, speech change and focal weakness.   Endo/Heme/Allergies: Bruises/bleeds easily.   Psychiatric/Behavioral: Negative for depression. The patient has insomnia. The patient is not nervous/anxious.         Couldn't sleep due to her cough       Physical Exam   Constitutional: She is oriented to person, place, and time. She appears well-developed and well-nourished. No distress.   HENT:   Head: Normocephalic and atraumatic.   Eyes: EOM are normal. Pupils are equal, round, and reactive to light. Right eye exhibits no  discharge. Left eye exhibits no discharge. No scleral icterus.   Neck: Normal range of motion. Neck supple. No JVD present.   Cardiovascular: Intact distal pulses.  An irregularly irregular rhythm present. Tachycardia present.  Exam reveals no gallop and no friction rub.    No murmur heard.  SR all night, converted to AF RVR at 08:57 this morning   Pulmonary/Chest: Effort normal and breath sounds normal. No stridor. No respiratory distress. She has no wheezes. She has no rales.   Abdominal: Soft. Bowel sounds are normal. She exhibits no distension. There is no tenderness. There is no rebound and no guarding.   Musculoskeletal: Normal range of motion. She exhibits no edema.   Neurological: She is alert and oriented to person, place, and time.   Skin: Skin is warm and dry. No rash noted. She is not diaphoretic. No erythema. No pallor.   Psychiatric: She has a normal mood and affect. Her behavior is normal. Judgment and thought content normal.   Nursing note and vitals reviewed.    Hemodynamics:  Temp (24hrs), Av.4 °C (97.5 °F), Min:35.8 °C (96.5 °F), Max:37.1 °C (98.8 °F)  Temperature: 36.4 °C (97.6 °F)  Pulse  Av  Min: 54  Max: 170  Blood Pressure : (!) 163/70 (NOTIFIED RN)       Respiratory:  Respiration: 15, Pulse Oximetry: 97 %  Work Of Breathing / Effort: Mild  RUL Breath Sounds: Diminished, RML Breath Sounds: Diminished, RLL Breath Sounds: Diminished, DO Breath Sounds: Diminished, LLL Breath Sounds: Diminished    Fluids:  Weight: 82.3 kg (181 lb 7 oz)    GI/Nutrition:  Orders Placed This Encounter   Procedures   • DIET ORDER     Standing Status:   Standing     Number of Occurrences:   1     Order Specific Question:   Diet:     Answer:   Cardiac [6]     Lab Results:  Recent Labs      17   0324  17   0305   WBC  10.7  11.1*   RBC  4.12*  4.24   HEMOGLOBIN  12.8  13.6   HEMATOCRIT  39.6  41.7   MCV  96.1  98.3*   MCH  31.1  32.1   MCHC  32.3*  32.6*   RDW  48.0  48.6   PLATELETCT  259  288    MPV  9.9  10.1     Recent Labs      12/18/17   0324  12/18/17   0917  12/18/17   1350  12/19/17   0305   SODIUM  138  142   --   137   POTASSIUM  3.6  3.8  4.7  4.2   CHLORIDE  105  102   --   102   CO2  25  30   --   28   GLUCOSE  110*  111*   --   104*   BUN  11  11   --   11   CREATININE  0.35*  0.44*   --   0.56   CALCIUM  8.8  9.0   --   8.9     Recent Labs      12/18/17   1005  12/19/17   0305  12/20/17   0333   INR  1.43*  1.44*  1.77*     Medical Decision Making, by Problem:  Active Hospital Problems    Diagnosis   • *Atrial fibrillation with rapid ventricular response (CMS-HCC) [I48.91]   • Obesity (BMI 30-39.9) [E66.9]   • CAD (coronary artery disease) [I25.10]   • HTN (hypertension) [I10]   • Hypothyroidism [E03.9]   • Hyperthyroidism [E05.90]   • Dysphagia [R13.10]     Plan:    Continue to monitor AF rate.  Will likely convert back to SR on her own per Dr. Matthews.    Continue tikosyn at 500 mcg dose, EKG's 2 hours after each dose, QTc stable    Mg 1.7, repleted with 2g IV Mag & ordered PO starting tomorrow    K 3.8, started 20 meq PO daily    Start tessalon for cough, obtain cxr, I.S., CBC pending    PT/OT, OOB during the day    Atenolol dc'd yesterday by Dr. Matthews due to cough, placed back on low dose digoxin; hospitalist added her home dose of lisinopril this morning    Continue coumadin per pharmacy, bridging with lovenox    Check CBC, BMP, Mg tomorrow      Reviewed items::  EKG reviewed, Radiology images reviewed, Labs reviewed and Medications reviewed  Pruett catheter::  No Pruett  DVT prophylaxis pharmacological::  Enoxaparin (Lovenox) and Warfarin (Coumadin)

## 2017-12-20 NOTE — PROGRESS NOTES
Pt resting in bed, even visible chest rise, no apparent signs of distress, no complains of pain or SOB, bed alarm on, call light in place, bed in lowest locked position.

## 2017-12-20 NOTE — PROGRESS NOTES
Swallowing normally wants a regular diet.  Mild cough possible from atenolol.  Advance diet, DC atenolol and restart low dose dig

## 2017-12-20 NOTE — DISCHARGE PLANNING
Medical Social Work    Received call back from bed day RN. Pt's insurance covers generic of Tikosyn for $16.00

## 2017-12-20 NOTE — CARE PLAN
Problem: Safety  Goal: Will remain free from injury  Outcome: PROGRESSING AS EXPECTED  Pt use call light appropriately for help. Fall precautions in place. Safety ongoing.     Problem: Venous Thromboembolism (VTW)/Deep Vein Thrombosis (DVT) Prevention:  Goal: Patient will participate in Venous Thrombosis (VTE)/Deep Vein Thrombosis (DVT)Prevention Measures    Intervention: Assess and monitor for anticoagulation complications  Pt on Lovenox to Coumadin bridge. No s/s of complications noted, will continue to monitor.       Problem: Knowledge Deficit  Goal: Knowledge of disease process/condition, treatment plan, diagnostic tests, and medications will improve    Intervention: Assess knowledge level of disease process/condition, treatment plan, diagnostic tests, and medications  Plan of care and medication administration discussed with pt.       Problem: Respiratory:  Goal: Respiratory status will improve  Outcome: PROGRESSING AS EXPECTED  Pt saturation adequate on 1L.

## 2017-12-20 NOTE — CARE PLAN
Problem: Safety  Goal: Will remain free from injury  Outcome: PROGRESSING AS EXPECTED  No falls or injury during shift    Problem: Infection  Goal: Will remain free from infection  Outcome: PROGRESSING AS EXPECTED  No new signs or symptoms of infection during shift

## 2017-12-20 NOTE — DISCHARGE PLANNING
Medical Social Work    Referral: tikosyn    Intervention: SW received Rx and SW faxed Rx to [facility] in [place] to obtain prior auth.    Plan: SW to remain available.

## 2017-12-20 NOTE — PROGRESS NOTES
Inpatient Anticoagulation Service Note    Date: 12/20/2017  Reason for Anticoagulation: Atrial Fibrillation   [id number] VASc Score: 5    Hemoglobin Value: 14.2  Hematocrit Value: 44  Lab Platelet Value: 332  Target INR: 2.0 to 3.0    INR from last 7 days     Date/Time INR Value    12/20/17 0333 (!)  1.77    12/19/17 0305 (!)  1.44    12/18/17 1005 (!)  1.43    12/15/17 1350 (!)  2.6        Dose from last 7 days     Date/Time Dose (mg)    12/20/17 1200  3.75    12/19/17 0800  2.5    12/18/17 1100  5        Average Dose (mg):  (home dose: 3.75 mg MW, 2.5 mg AOD )  Significant Interactions: Aspirin  Bridge Therapy: Yes     HPI: 78 yo female admitted 12/15/17 for difficulty swallowing s/p ablation on Monday 12/11/17 whereby Afib RVR re-occurred. Cardiac EP initiated Tikosyn yesterday (12/18). Patient is chronically anticoagulated outpatient with warfarin therapy for h/o Afib. Warfarin managed by the [facility] and per records, patient is prescribed warfarin 3.75mg (one + one-half tablet) po every Monday and Thursday and warfarin 2.5mg (one tablet) po on all other days. Recent INRs on file from [facility] within therapeutic range on this regimen. No new potential drug-drug interactions identified. Patient also takes aspirin for CAD - established interaction. Bridging back to therapeutic INR with therapeutic lovenox.      Assessment: INR remain SUBtherapeutic with a positive trend upward towards goal. Patient received a bolus dose of warfarin 5mg x one and then home dosing regimen was initiated. Patient received 2.5mg per usual regimen last night.      Plan:  Continue usual home dosing regimen with continued INR monitoring until re-stabilized on warfarin regimen. Lovenox may be discontinued when INR returns to goal range.      Education Material Provided?: No (Chronic warfarin patient)     Pharmacist suggested discharge dosing: warfarin 3.75mg (one + one-half tablet) po every Monday and Thursday and warfarin 2.5mg (one tablet) po on all  other days with f/u appointment with the [facility].      Alem La, PharmD

## 2017-12-20 NOTE — DISCHARGE PLANNING
Medical Social Work    Followed up with [facility] on [address].  RX requires PA #[phone]     Spoke to bed day RN and faxed script with PA number.

## 2017-12-20 NOTE — PROGRESS NOTES
[facility] Hospitalist Progress Note    Date of Service: 12/20/2017    Chief Complaint  77 y.o. female admitted 12/15/2017 with atrial fibrillation with RVR dysphagia.     Interval Problem Update  Patient is feeling much better, her HR is better controlled under 100. Patient denies fevers/chills, chest pain, shortness of breath or nausea/vommiting.   Continue verapamil gtt, cardiology following and titrating  Speech eval pending for dysphagia.  TSH depressed, .030, r/o thyroid storm? Checking Free T4.       12/17  No acute issues overnight, patient feels tired, but feels better, and reports less throat discomfort.  HR stable.  Continue PO verapamil, and digoxin. Titrate dose per cardiology.    12/18  Patient feeling better, still tired. Patient denies fevers/chills, chest pain, shortness of breath or nausea/vommiting.   Patient converted back and forth from sinus to AFib 106-130  Continue verapamil and digoxin.  EP to consult.    12/19  Patient doing well, says her soreness in her throat is improving.  Pending SLP re-revaluation.  Tikosyn was started yesterday, continue to monitor EKG qt/qtc.  Verapamil stopped    12/20  Patient feeling well today, HR SR 60-75 overnight however went into atrial fibrillation this morning.  D/c atenolol digoxin started per cards yesterday.   Cont lovenox, warfarin bridge.  Continue tikosyn.  Per  patient may most likely covert back to sinus. Will cont tomonitor closely.     Consultants/Specialty  Cardiology  SLP    Disposition  TBD        Review of Systems   Constitutional: Positive for malaise/fatigue. Negative for chills and fever.   HENT: Negative for congestion, hearing loss, sore throat and tinnitus.    Eyes: Negative for blurred vision, double vision, photophobia and pain.   Respiratory: Negative for cough, hemoptysis, sputum production, shortness of breath and stridor.    Cardiovascular: Negative for chest pain, palpitations, orthopnea, claudication and PND.    Gastrointestinal: Negative for blood in stool, constipation, heartburn, melena, nausea and vomiting.   Genitourinary: Negative for dysuria, frequency and urgency.   Musculoskeletal: Negative for back pain, myalgias and neck pain.   Neurological: Positive for weakness. Negative for dizziness, tingling, tremors, sensory change, speech change and headaches.   Psychiatric/Behavioral: Negative for depression, memory loss and suicidal ideas. The patient is not nervous/anxious.       Physical Exam  Laboratory/Imaging   Hemodynamics  Temp (24hrs), Av.4 °C (97.5 °F), Min:35.8 °C (96.5 °F), Max:37.1 °C (98.8 °F)   Temperature: 36.4 °C (97.6 °F)  Pulse  Av  Min: 54  Max: 170   Blood Pressure : (!) 163/70 (NOTIFIED RN)      Respiratory      Respiration: 15, Pulse Oximetry: 97 %     Work Of Breathing / Effort: Mild  RUL Breath Sounds: Diminished, RML Breath Sounds: Diminished, RLL Breath Sounds: Diminished, DO Breath Sounds: Diminished, LLL Breath Sounds: Diminished    Fluids    Intake/Output Summary (Last 24 hours) at 17 0842  Last data filed at 17 0100   Gross per 24 hour   Intake              340 ml   Output                0 ml   Net              340 ml       Nutrition  Orders Placed This Encounter   Procedures   • DIET ORDER     Standing Status:   Standing     Number of Occurrences:   1     Order Specific Question:   Diet:     Answer:   Cardiac [6]     Physical Exam   Constitutional: She is oriented to person, place, and time. She appears well-developed and well-nourished. No distress.   HENT:   Head: Normocephalic and atraumatic.   Mouth/Throat: No oropharyngeal exudate.   Eyes: Conjunctivae are normal. Pupils are equal, round, and reactive to light. Right eye exhibits no discharge. No scleral icterus.   Neck: Neck supple. No JVD present. No thyromegaly present.   Cardiovascular: Intact distal pulses.    No murmur heard.  Irregularly irregular.     Pulmonary/Chest: Effort normal and breath sounds  normal. No stridor. No respiratory distress. She has no wheezes. She has no rales.   Abdominal: Soft. Bowel sounds are normal. She exhibits no distension. There is no tenderness. There is no rebound.   Musculoskeletal: Normal range of motion. She exhibits no edema.   Neurological: She is alert and oriented to person, place, and time. No cranial nerve deficit.   Skin: Skin is warm. She is not diaphoretic. No erythema.   Psychiatric: She has a normal mood and affect. Her behavior is normal. Judgment and thought content normal.       Recent Labs      12/18/17   0324  12/19/17   0305   WBC  10.7  11.1*   RBC  4.12*  4.24   HEMOGLOBIN  12.8  13.6   HEMATOCRIT  39.6  41.7   MCV  96.1  98.3*   MCH  31.1  32.1   MCHC  32.3*  32.6*   RDW  48.0  48.6   PLATELETCT  259  288   MPV  9.9  10.1     Recent Labs      12/18/17   0324  12/18/17   0917  12/18/17   1350  12/19/17   0305   SODIUM  138  142   --   137   POTASSIUM  3.6  3.8  4.7  4.2   CHLORIDE  105  102   --   102   CO2  25  30   --   28   GLUCOSE  110*  111*   --   104*   BUN  11  11   --   11   CREATININE  0.35*  0.44*   --   0.56   CALCIUM  8.8  9.0   --   8.9     Recent Labs      12/18/17   1005  12/19/17   0305  12/20/17   0333   INR  1.43*  1.44*  1.77*                  Assessment/Plan     * Atrial fibrillation with rapid ventricular response (CMS-HCC)- (present on admission)   Assessment & Plan    Patient had history of afib, s/p ablation, however reoccurred.   Continue PO verapamil, and digoxin. Titrate dose per cardiology.  Continue coumadin, measure INR daily.  EP following, continue digoxin and Tikosyn   Was in Sinus rhythm. Then converted to atrial fibrillation, per cardiology will cont tikosyn for hopeful conversion        Obesity (BMI 30-39.9)- (present on admission)   Assessment & Plan    encouraged diet and exercise        CAD (coronary artery disease)- (present on admission)   Assessment & Plan    Elevated troponins 2/2 to rapid RVR.  HR  controlled  Patient denies chest pain.        HTN (hypertension)- (present on admission)   Assessment & Plan    Stable , held home dose of lasix, lisinoril will resume when medically stable         Hyperthyroidism- (present on admission)   Assessment & Plan    TSH depressed, .030, Free T4 mildly elevated.  Previous TSH/Free T4 wnl.   Hold synthroid, will need lower dose and needs re-checking in 3-4 weeks.        Dysphagia- (present on admission)   Assessment & Plan    Difficulty swallowing occurred after ablation several days ago.  Barium swallow shows mod esophageal dysmotility.  Diet / Liquid Recommendation: Dysphagia I, Thin Liquid  SLP re-revaluation pending         Hypothyroidism- (present on admission)   Assessment & Plan    Stable holding medication for now.           Patient plan of care discussed at multidisplinary team rounds and with patient and R.N at beside.      Reviewed items::  Labs reviewed, Medications reviewed and EKG reviewed  Pruett catheter::  No Pruett  DVT prophylaxis pharmacological::  Warfarin (Coumadin)

## 2017-12-20 NOTE — PROGRESS NOTES
Bedside report received and pt assessed. Pt sitting up in bed eating solid food, cardiac diet, and tolerating well. No acte s/s of distress noted. No needs voiced at this time. Bed in lowest position and locked.Belongigns and call light in reach.Safety maintained, will continue to monitor.

## 2017-12-20 NOTE — CARE PLAN
Problem: Nutritional:  Goal: Achieve adequate nutritional intake  Patient will consume >50% of meals   Outcome: PROGRESSING AS EXPECTED  Pt advanced to Cardiac diet; PO 50-75% for one meal so far.

## 2017-12-20 NOTE — DISCHARGE PLANNING
Medical Social Work    MATY received call from Ale at [facility]. Per Ale, if pt discharges home on Tikosyn it will require prior auth. MATY asked Dr. Escobedo for a script in order to obtain prior auth.

## 2017-12-20 NOTE — PROGRESS NOTES
Report received at the bed side. No family at bedside. Pt sleeping. No signs of distress or SOB. Call light and belongings within reach. Bed alarm in place. Bed in lowest position.

## 2017-12-21 LAB
ALBUMIN SERPL BCP-MCNC: 3 G/DL (ref 3.2–4.9)
ALBUMIN/GLOB SERPL: 1.2 G/DL
ALP SERPL-CCNC: 56 U/L (ref 30–99)
ALT SERPL-CCNC: 8 U/L (ref 2–50)
ANION GAP SERPL CALC-SCNC: 8 MMOL/L (ref 0–11.9)
AST SERPL-CCNC: 12 U/L (ref 12–45)
BASOPHILS # BLD AUTO: 0.7 % (ref 0–1.8)
BASOPHILS # BLD: 0.07 K/UL (ref 0–0.12)
BILIRUB SERPL-MCNC: 0.7 MG/DL (ref 0.1–1.5)
BNP SERPL-MCNC: 161 PG/ML (ref 0–100)
BUN SERPL-MCNC: 8 MG/DL (ref 8–22)
CALCIUM SERPL-MCNC: 9.1 MG/DL (ref 8.5–10.5)
CHLORIDE SERPL-SCNC: 104 MMOL/L (ref 96–112)
CO2 SERPL-SCNC: 27 MMOL/L (ref 20–33)
CREAT SERPL-MCNC: 0.48 MG/DL (ref 0.5–1.4)
EKG IMPRESSION: NORMAL
EKG IMPRESSION: NORMAL
EOSINOPHIL # BLD AUTO: 0.48 K/UL (ref 0–0.51)
EOSINOPHIL NFR BLD: 4.8 % (ref 0–6.9)
ERYTHROCYTE [DISTWIDTH] IN BLOOD BY AUTOMATED COUNT: 47 FL (ref 35.9–50)
GFR SERPL CREATININE-BSD FRML MDRD: >60 ML/MIN/1.73 M 2
GLOBULIN SER CALC-MCNC: 2.6 G/DL (ref 1.9–3.5)
GLUCOSE SERPL-MCNC: 103 MG/DL (ref 65–99)
HCT VFR BLD AUTO: 39.3 % (ref 37–47)
HGB BLD-MCNC: 13 G/DL (ref 12–16)
IMM GRANULOCYTES # BLD AUTO: 0.27 K/UL (ref 0–0.11)
IMM GRANULOCYTES NFR BLD AUTO: 2.7 % (ref 0–0.9)
INR PPP: 1.8 (ref 0.87–1.13)
LYMPHOCYTES # BLD AUTO: 4.03 K/UL (ref 1–4.8)
LYMPHOCYTES NFR BLD: 40.3 % (ref 22–41)
MAGNESIUM SERPL-MCNC: 1.9 MG/DL (ref 1.5–2.5)
MCH RBC QN AUTO: 31.9 PG (ref 27–33)
MCHC RBC AUTO-ENTMCNC: 33.1 G/DL (ref 33.6–35)
MCV RBC AUTO: 96.6 FL (ref 81.4–97.8)
MONOCYTES # BLD AUTO: 0.83 K/UL (ref 0–0.85)
MONOCYTES NFR BLD AUTO: 8.3 % (ref 0–13.4)
NEUTROPHILS # BLD AUTO: 4.33 K/UL (ref 2–7.15)
NEUTROPHILS NFR BLD: 43.2 % (ref 44–72)
NRBC # BLD AUTO: 0 K/UL
NRBC BLD-RTO: 0 /100 WBC
PLATELET # BLD AUTO: 299 K/UL (ref 164–446)
PMV BLD AUTO: 10 FL (ref 9–12.9)
POTASSIUM SERPL-SCNC: 3.8 MMOL/L (ref 3.6–5.5)
PROT SERPL-MCNC: 5.6 G/DL (ref 6–8.2)
PROTHROMBIN TIME: 20.6 SEC (ref 12–14.6)
RBC # BLD AUTO: 4.07 M/UL (ref 4.2–5.4)
SODIUM SERPL-SCNC: 139 MMOL/L (ref 135–145)
WBC # BLD AUTO: 10 K/UL (ref 4.8–10.8)

## 2017-12-21 PROCEDURE — 700102 HCHG RX REV CODE 250 W/ 637 OVERRIDE(OP): Performed by: NURSE PRACTITIONER

## 2017-12-21 PROCEDURE — 770020 HCHG ROOM/CARE - TELE (206)

## 2017-12-21 PROCEDURE — 99232 SBSQ HOSP IP/OBS MODERATE 35: CPT | Performed by: HOSPITALIST

## 2017-12-21 PROCEDURE — 83880 ASSAY OF NATRIURETIC PEPTIDE: CPT

## 2017-12-21 PROCEDURE — A9270 NON-COVERED ITEM OR SERVICE: HCPCS | Performed by: HOSPITALIST

## 2017-12-21 PROCEDURE — 93005 ELECTROCARDIOGRAM TRACING: CPT | Performed by: HOSPITALIST

## 2017-12-21 PROCEDURE — 92526 ORAL FUNCTION THERAPY: CPT

## 2017-12-21 PROCEDURE — G8997 SWALLOW GOAL STATUS: HCPCS | Mod: CI

## 2017-12-21 PROCEDURE — 700102 HCHG RX REV CODE 250 W/ 637 OVERRIDE(OP): Performed by: HOSPITALIST

## 2017-12-21 PROCEDURE — G8980 MOBILITY D/C STATUS: HCPCS | Mod: CI

## 2017-12-21 PROCEDURE — A9270 NON-COVERED ITEM OR SERVICE: HCPCS | Performed by: NURSE PRACTITIONER

## 2017-12-21 PROCEDURE — 85610 PROTHROMBIN TIME: CPT

## 2017-12-21 PROCEDURE — G8979 MOBILITY GOAL STATUS: HCPCS | Mod: CI

## 2017-12-21 PROCEDURE — A9270 NON-COVERED ITEM OR SERVICE: HCPCS | Performed by: INTERNAL MEDICINE

## 2017-12-21 PROCEDURE — 83735 ASSAY OF MAGNESIUM: CPT

## 2017-12-21 PROCEDURE — 93010 ELECTROCARDIOGRAM REPORT: CPT | Mod: 77 | Performed by: INTERNAL MEDICINE

## 2017-12-21 PROCEDURE — 700102 HCHG RX REV CODE 250 W/ 637 OVERRIDE(OP): Performed by: INTERNAL MEDICINE

## 2017-12-21 PROCEDURE — 36415 COLL VENOUS BLD VENIPUNCTURE: CPT

## 2017-12-21 PROCEDURE — 700111 HCHG RX REV CODE 636 W/ 250 OVERRIDE (IP): Performed by: NURSE PRACTITIONER

## 2017-12-21 PROCEDURE — 85025 COMPLETE CBC W/AUTO DIFF WBC: CPT

## 2017-12-21 PROCEDURE — G8978 MOBILITY CURRENT STATUS: HCPCS | Mod: CI

## 2017-12-21 PROCEDURE — G8998 SWALLOW D/C STATUS: HCPCS | Mod: CH

## 2017-12-21 PROCEDURE — 700111 HCHG RX REV CODE 636 W/ 250 OVERRIDE (IP): Performed by: INTERNAL MEDICINE

## 2017-12-21 PROCEDURE — 97161 PT EVAL LOW COMPLEX 20 MIN: CPT

## 2017-12-21 PROCEDURE — 80053 COMPREHEN METABOLIC PANEL: CPT

## 2017-12-21 PROCEDURE — 93010 ELECTROCARDIOGRAM REPORT: CPT | Performed by: INTERNAL MEDICINE

## 2017-12-21 RX ORDER — DIGOXIN 0.25 MG/ML
250 INJECTION INTRAMUSCULAR; INTRAVENOUS ONCE
Status: COMPLETED | OUTPATIENT
Start: 2017-12-21 | End: 2017-12-21

## 2017-12-21 RX ORDER — WARFARIN SODIUM 5 MG/1
5 TABLET ORAL
Status: COMPLETED | OUTPATIENT
Start: 2017-12-21 | End: 2017-12-21

## 2017-12-21 RX ORDER — FUROSEMIDE 40 MG/1
40 TABLET ORAL
Status: DISCONTINUED | OUTPATIENT
Start: 2017-12-21 | End: 2017-12-22 | Stop reason: HOSPADM

## 2017-12-21 RX ORDER — DOFETILIDE 0.5 MG/1
500 CAPSULE ORAL EVERY 12 HOURS
Qty: 60 CAP | Refills: 3 | Status: SHIPPED | OUTPATIENT
Start: 2017-12-21 | End: 2018-04-18 | Stop reason: SDUPTHER

## 2017-12-21 RX ADMIN — BENZONATATE 100 MG: 100 CAPSULE ORAL at 21:49

## 2017-12-21 RX ADMIN — BENZONATATE 100 MG: 100 CAPSULE ORAL at 15:45

## 2017-12-21 RX ADMIN — POTASSIUM CHLORIDE 20 MEQ: 1500 TABLET, EXTENDED RELEASE ORAL at 08:58

## 2017-12-21 RX ADMIN — ENOXAPARIN SODIUM 80 MG: 100 INJECTION SUBCUTANEOUS at 08:58

## 2017-12-21 RX ADMIN — MAGNESIUM GLUCONATE 500 MG ORAL TABLET 400 MG: 500 TABLET ORAL at 08:57

## 2017-12-21 RX ADMIN — FUROSEMIDE 40 MG: 40 TABLET ORAL at 08:57

## 2017-12-21 RX ADMIN — DOFETILIDE 500 MCG: 0.5 CAPSULE ORAL at 08:58

## 2017-12-21 RX ADMIN — ASPIRIN 81 MG: 81 TABLET, CHEWABLE ORAL at 08:57

## 2017-12-21 RX ADMIN — DIGOXIN 250 MCG: 0.25 INJECTION INTRAMUSCULAR; INTRAVENOUS at 15:45

## 2017-12-21 RX ADMIN — FUROSEMIDE 40 MG: 40 TABLET ORAL at 17:26

## 2017-12-21 RX ADMIN — LISINOPRIL 5 MG: 5 TABLET ORAL at 08:58

## 2017-12-21 RX ADMIN — DOFETILIDE 500 MCG: 0.5 CAPSULE ORAL at 20:07

## 2017-12-21 RX ADMIN — ENOXAPARIN SODIUM 80 MG: 100 INJECTION SUBCUTANEOUS at 20:06

## 2017-12-21 RX ADMIN — WARFARIN SODIUM 5 MG: 5 TABLET ORAL at 17:27

## 2017-12-21 RX ADMIN — DIGOXIN 125 MCG: 125 TABLET ORAL at 17:26

## 2017-12-21 RX ADMIN — DIGOXIN 250 MCG: 0.25 INJECTION INTRAMUSCULAR; INTRAVENOUS at 19:53

## 2017-12-21 ASSESSMENT — ENCOUNTER SYMPTOMS
PND: 0
EYE PAIN: 0
SPUTUM PRODUCTION: 0
FEVER: 0
BLURRED VISION: 0
BLOOD IN STOOL: 0
COUGH: 0
DEPRESSION: 0
VOMITING: 0
MEMORY LOSS: 0
TINGLING: 0
DIZZINESS: 0
SPEECH CHANGE: 0
WEAKNESS: 1
PALPITATIONS: 0
MYALGIAS: 0
HEADACHES: 0
SORE THROAT: 0
SENSORY CHANGE: 0
CLAUDICATION: 0
ORTHOPNEA: 0
CONSTIPATION: 0
SHORTNESS OF BREATH: 0
HEMOPTYSIS: 0
NERVOUS/ANXIOUS: 0
CHILLS: 0
PHOTOPHOBIA: 0
HEARTBURN: 0
BACK PAIN: 0
DOUBLE VISION: 0
NAUSEA: 0
TREMORS: 0
NECK PAIN: 0
STRIDOR: 0

## 2017-12-21 ASSESSMENT — COGNITIVE AND FUNCTIONAL STATUS - GENERAL
MOBILITY SCORE: 24
SUGGESTED CMS G CODE MODIFIER MOBILITY: CH

## 2017-12-21 ASSESSMENT — PAIN SCALES - GENERAL
PAINLEVEL_OUTOF10: 0

## 2017-12-21 ASSESSMENT — GAIT ASSESSMENTS
GAIT LEVEL OF ASSIST: SUPERVISED
ASSISTIVE DEVICE: FRONT WHEEL WALKER
DISTANCE (FEET): 150

## 2017-12-21 NOTE — PROGRESS NOTES
MS: SR/AFIB/AFLUTTER     (.-08-)    Heart rate up to 157 but did not sustain. Pt was asymptomatic, resting quietly in bed and easily aroused. VSS. In and out of of Afib and NSR during shift.

## 2017-12-21 NOTE — CARE PLAN
Problem: Safety  Goal: Will remain free from injury    Intervention: Provide assistance with mobility  Fall precautions initiated. Personal belongings and call light in reach. Safety ongoing.       Problem: Infection  Goal: Will remain free from infection  Outcome: PROGRESSING AS EXPECTED  NO signs and symptoms of infection noted at this time.     Problem: Knowledge Deficit  Goal: Knowledge of disease process/condition, treatment plan, diagnostic tests, and medications will improve    Intervention: Assess knowledge level of disease process/condition, treatment plan, diagnostic tests, and medications  Plan of care and medication administration discussed with pt.

## 2017-12-21 NOTE — PROGRESS NOTES
Inpatient Anticoagulation Service Note    Date: 12/21/2017  Reason for Anticoagulation: Atrial Fibrillation   [id number] VASc Score: 5    Hemoglobin Value: 13  Hematocrit Value: 39.3  Lab Platelet Value: 299  Target INR: 2.0 to 3.0    INR from last 7 days     Date/Time INR Value    12/21/17 0243 (!)  1.8    12/20/17 0333 (!)  1.77    12/19/17 0305 (!)  1.44    12/18/17 1005 (!)  1.43    12/15/17 1350 (!)  2.6        Dose from last 7 days     Date/Time Dose (mg)    12/20/17 1200  3.75    12/19/17 0800  2.5    12/18/17 1100  5        Average Dose (mg):  (home dose: 3.75 mg MW, 2.5 mg AOD )  Significant Interactions: Aspirin  Bridge Therapy: Yes       HPI: 76 yo female admitted 12/15/17 for difficulty swallowing s/p ablation on Monday 12/11/17 whereby Afib RVR re-occurred. Cardiac EP initiated Tikosyn yesterday (12/18). Patient is chronically anticoagulated outpatient with warfarin therapy for h/o Afib. Warfarin managed by the [facility] and per records, patient is prescribed warfarin 3.75mg (one + one-half tablet) po every Monday and Thursday and warfarin 2.5mg (one tablet) po on all other days. Recent INRs on file from [facility] within therapeutic range on this regimen. No new potential drug-drug interactions identified. Patient also takes aspirin for CAD - established interaction. Bridging back to therapeutic INR with therapeutic lovenox.       Assessment: INR remains SUBtherapeutic with continued positive trend upward towards goal, just under goal today at 1.8. Patient received a bolus dose of warfarin 5mg x one at re-initiation and then home dosing regimen was restarted. Patient received warfarin 3.75mg last night.      Plan:  Dr Matthews ordered warfarin 5mg po x one tonight to achieve therapeutic goal more quickly and prepare for discharge. Cardiology team does not want to discharge patient home with lovenox.  Warfarin 5mg po tonight as ordered by physician and then will discharge home tomorrow with usual home dosing regimen.   Ordered INR x one for tomorrow morning.      Education Material Provided?: No (Chronic warfarin patient)     Pharmacist suggested discharge dosing: warfarin 3.75mg (one + one-half tablet) po every Monday and Thursday and warfarin 2.5mg (one tablet) po on all other days with f/u appointment with the [facility].      Alem La, PharmD

## 2017-12-21 NOTE — THERAPY
"Physical Therapy Evaluation completed.   Bed Mobility:  Supine to Sit: Supervised  Transfers: Sit to Stand: Supervised  Gait: Level Of Assist: Supervised with Front-Wheel Walker       Plan of Care: Patient with no further skilled PT needs in the acute care setting at this time  Discharge Recommendations: Equipment: Front-Wheel Walker.       See \"Rehab Therapy-Acute\" Patient Summary Report for complete documentation.     "

## 2017-12-21 NOTE — PROGRESS NOTES
Notified by monitor tech pt heart rate up to 157. VSS. Pt asymptomatic. Will continue to monitor.    2344-Heart rate between 117-124    Pt currently AFlutter on the heart monitor.     0044-Pt AFlutter on the heart monitor. Rate at this time is fluctuating between 113, 126, 134. Pt resting quietly in bed, easily aroused and is asymptomatic.    0256-Heart rate 71 at this time. Pt in and out of Afib and SR. Pt assessed. No acute s/s of distress noted.

## 2017-12-21 NOTE — PROGRESS NOTES
SR/afib   O PVC  R Coup  F PAC  Up to 170   Up to 180  Converted to a fib 0857  Converted to SR 1301

## 2017-12-21 NOTE — PROGRESS NOTES
Bedside report received and pt assessed. Pt still has non productive cough. No acute s/s of distress noted. Bed in lowest position and locked. Belongings and call light in reach. Safety maintained, will continue to monitor.

## 2017-12-21 NOTE — PROGRESS NOTES
Cardiology Progress Note               Author: Bo Matthews Date & Time created: 2017  7:22 AM     Interval History:  Post fifth dose of tikosyn. QTc ok. In SR. Mild cough. FERRO unchanged. Just restarted her lisinopril yesterday. Not on her lasix which is a chronic med. Swallowing nl.    ROS    Physical Exam   Constitutional: She is oriented to person, place, and time. She appears well-developed. No distress.   HENT:   Mouth/Throat: Mucous membranes are normal.   Eyes: Conjunctivae and EOM are normal.   Neck: No JVD present. No tracheal deviation present. No thyroid mass and no thyromegaly present.   Cardiovascular: Normal rate, regular rhythm and intact distal pulses.    No murmur heard.  Pulmonary/Chest: Effort normal and breath sounds normal. No respiratory distress. She exhibits no tenderness.   Abdominal: Soft. There is no tenderness.   Musculoskeletal: Normal range of motion. She exhibits no edema.   Neurological: She is alert and oriented to person, place, and time. She has normal strength. She displays no tremor.   Skin: Skin is warm and dry. She is not diaphoretic.   Psychiatric: She has a normal mood and affect. Her behavior is normal.   Vitals reviewed.      Hemodynamics:  Temp (24hrs), Av.4 °C (97.5 °F), Min:36.1 °C (97 °F), Max:36.6 °C (97.8 °F)  Temperature: 36.1 °C (97 °F)  Pulse  Av.4  Min: 54  Max: 170  Blood Pressure : 160/75 (rn notified)     Respiratory:    Respiration: 19, Pulse Oximetry: 97 %     Work Of Breathing / Effort: Mild  RUL Breath Sounds: Diminished, RML Breath Sounds: Diminished, RLL Breath Sounds: Diminished, DO Breath Sounds: Diminished, LLL Breath Sounds: Diminished  Fluids:     Weight: 79.7 kg (175 lb 11.3 oz)  GI/Nutrition:  Orders Placed This Encounter   Procedures   • DIET ORDER     Standing Status:   Standing     Number of Occurrences:   1     Order Specific Question:   Diet:     Answer:   Cardiac [6]     Lab Results:  Recent Labs      17   7816   12/20/17   1115  12/21/17   0242   WBC  11.1*  12.7*  10.0   RBC  4.24  4.55  4.07*   HEMOGLOBIN  13.6  14.2  13.0   HEMATOCRIT  41.7  44.0  39.3   MCV  98.3*  96.7  96.6   MCH  32.1  31.2  31.9   MCHC  32.6*  32.3*  33.1*   RDW  48.6  47.5  47.0   PLATELETCT  288  332  299   MPV  10.1  9.6  10.0     Recent Labs      12/18/17   0917  12/18/17   1350  12/19/17   0305  12/20/17   1044   SODIUM  142   --   137  137   POTASSIUM  3.8  4.7  4.2  3.8   CHLORIDE  102   --   102  101   CO2  30   --   28  29   GLUCOSE  111*   --   104*  178*   BUN  11   --   11  9   CREATININE  0.44*   --   0.56  0.45*   CALCIUM  9.0   --   8.9  8.7     Recent Labs      12/19/17   0305  12/20/17   0333  12/21/17   0243   INR  1.44*  1.77*  1.80*                     Medical Decision Making, by Problem:  Active Hospital Problems    Diagnosis   • *Atrial fibrillation with rapid ventricular response (CMS-HCC) [I48.91]   • Obesity (BMI 30-39.9) [E66.9]   • CAD (coronary artery disease) [I25.10]   • HTN (hypertension) [I10]   • Hypothyroidism [E03.9]   • Hyperthyroidism [E05.90]   • Dysphagia [R13.10]       Plan:  1. Htn continue Lisinopril. Restart lasix. BNP.  2. Anticoagulation still subtherapeutic give a higher dose today so can get off lovenox.  3. PT.  4. Hopefully home tomorrow.    Quality-Core Measures

## 2017-12-21 NOTE — THERAPY
"Speech Language Therapy dysphagia treatment completed.   Functional Status:  Patient seen for dysphagia therapy on this date.  Patient had been on a Dysphagia 1/thins diet from previous SLP, however, she had been upgraded to a regular cardiac diet by the MD.  Patient consumed bites and sips from her AM meal tray.  She presented with functional mastication and bolus manipulation, timely initiation of swallow trigger and adequate bolus size and pacing.  She maintained clear vocal quality throughout the session and had no overt s/sx of aspiration with any consistency consumed.  No c/o globus sensation but did state her appetite was poor.    Recommendations: At this time, recommend the patient continue regular/thins diet as tolerated.  No further acute SLP needs. Please reconsult with any change in status.    Plan of Care: Patient with no further skilled SLP needs in the acute care setting at this time  Post-Acute Therapy: Currently anticipate no further skilled therapy needs once patient is discharged from the inpatient setting.    See \"Rehab Therapy-Acute\" Patient Summary Report for complete documentation.     "

## 2017-12-21 NOTE — CARE PLAN
Problem: Safety  Goal: Will remain free from injury  Pt mobility assessed at the beginning of the shift. Fall precautions in place, non-slip socks on, bed in lowest, locked position, and call light is within reach. Pt educated to call for assistance, and verbalizes understanding.    Problem: Knowledge Deficit  Goal: Knowledge of disease process/condition, treatment plan, diagnostic tests, and medications will improve  Educated pt on disease process, treatment plan, and reason for medications she is on. Pt educated how to deep breath and cough.

## 2017-12-21 NOTE — PROGRESS NOTES
Paged cardiology due to pt sustaining HR >150. HR comes down but pt sustains on an off throughout day so far. Hospitalist MD aware, wants this RN to contact cards as well.

## 2017-12-21 NOTE — PROGRESS NOTES
[facility] Hospitalist Progress Note    Date of Service: 12/21/2017    Chief Complaint  77 y.o. female admitted 12/15/2017 with atrial fibrillation with RVR dysphagia.     Interval Problem Update  Patient is feeling much better, her HR is better controlled under 100. Patient denies fevers/chills, chest pain, shortness of breath or nausea/vommiting.   Continue verapamil gtt, cardiology following and titrating  Speech eval pending for dysphagia.  TSH depressed, .030, r/o thyroid storm? Checking Free T4.       12/17  No acute issues overnight, patient feels tired, but feels better, and reports less throat discomfort.  HR stable.  Continue PO verapamil, and digoxin. Titrate dose per cardiology.    12/18  Patient feeling better, still tired. Patient denies fevers/chills, chest pain, shortness of breath or nausea/vommiting.   Patient converted back and forth from sinus to AFib 106-130  Continue verapamil and digoxin.  EP to consult.    12/19  Patient doing well, says her soreness in her throat is improving.  Pending SLP re-revaluation.  Tikosyn was started yesterday, continue to monitor EKG qt/qtc.  Verapamil stopped    12/20  Patient feeling well today, HR SR 60-75 overnight however went into atrial fibrillation this morning.  D/c atenolol digoxin started per cards yesterday.   Cont lovenox, warfarin bridge.  Continue tikosyn.  Per  patient may most likely covert back to sinus. Will cont tomonitor closely.     12/21  Patient doing well overall, feels better, no dizzy. However she is still having intermittent episodes of AFIB in the 150s-160s.  Pending EP re-eval, if to make changes to medications    Consultants/Specialty  Cardiology  SLP    Disposition  TBD        Review of Systems   Constitutional: Positive for malaise/fatigue. Negative for chills and fever.   HENT: Negative for congestion, hearing loss, sore throat and tinnitus.    Eyes: Negative for blurred vision, double vision, photophobia and pain.    Respiratory: Negative for cough, hemoptysis, sputum production, shortness of breath and stridor.    Cardiovascular: Negative for chest pain, palpitations, orthopnea, claudication and PND.   Gastrointestinal: Negative for blood in stool, constipation, heartburn, melena, nausea and vomiting.   Genitourinary: Negative for dysuria, frequency and urgency.   Musculoskeletal: Negative for back pain, myalgias and neck pain.   Neurological: Positive for weakness. Negative for dizziness, tingling, tremors, sensory change, speech change and headaches.   Psychiatric/Behavioral: Negative for depression, memory loss and suicidal ideas. The patient is not nervous/anxious.       Physical Exam  Laboratory/Imaging   Hemodynamics  Temp (24hrs), Av.3 °C (97.3 °F), Min:36.1 °C (96.9 °F), Max:36.4 °C (97.6 °F)   Temperature: 36.1 °C (96.9 °F)  Pulse  Av.4  Min: 54  Max: 170   Blood Pressure : (!) 162/72      Respiratory      Respiration: 18, Pulse Oximetry: 97 %     Work Of Breathing / Effort: Mild  RUL Breath Sounds: Diminished, RML Breath Sounds: Diminished, RLL Breath Sounds: Diminished, DO Breath Sounds: Diminished, LLL Breath Sounds: Diminished    Fluids    Intake/Output Summary (Last 24 hours) at 17 1525  Last data filed at 17   Gross per 24 hour   Intake              120 ml   Output                0 ml   Net              120 ml       Nutrition  Orders Placed This Encounter   Procedures   • DIET ORDER     Standing Status:   Standing     Number of Occurrences:   1     Order Specific Question:   Diet:     Answer:   Cardiac [6]     Physical Exam   Constitutional: She is oriented to person, place, and time. She appears well-developed and well-nourished. No distress.   HENT:   Head: Normocephalic and atraumatic.   Mouth/Throat: No oropharyngeal exudate.   Eyes: Conjunctivae are normal. Pupils are equal, round, and reactive to light. Right eye exhibits no discharge. No scleral icterus.   Neck: Neck supple.  No JVD present. No thyromegaly present.   Cardiovascular: Intact distal pulses.    No murmur heard.  Irregularly irregular.     Pulmonary/Chest: Effort normal and breath sounds normal. No stridor. No respiratory distress. She has no wheezes. She has no rales.   Abdominal: Soft. Bowel sounds are normal. She exhibits no distension. There is no tenderness. There is no rebound.   Musculoskeletal: Normal range of motion. She exhibits no edema.   Neurological: She is alert and oriented to person, place, and time. No cranial nerve deficit.   Skin: Skin is warm. She is not diaphoretic. No erythema.   Psychiatric: She has a normal mood and affect. Her behavior is normal. Judgment and thought content normal.       Recent Labs      12/19/17   0305  12/20/17   1115  12/21/17   0242   WBC  11.1*  12.7*  10.0   RBC  4.24  4.55  4.07*   HEMOGLOBIN  13.6  14.2  13.0   HEMATOCRIT  41.7  44.0  39.3   MCV  98.3*  96.7  96.6   MCH  32.1  31.2  31.9   MCHC  32.6*  32.3*  33.1*   RDW  48.6  47.5  47.0   PLATELETCT  288  332  299   MPV  10.1  9.6  10.0     Recent Labs      12/19/17   0305  12/20/17   1044  12/21/17   0242   SODIUM  137  137  139   POTASSIUM  4.2  3.8  3.8   CHLORIDE  102  101  104   CO2  28  29  27   GLUCOSE  104*  178*  103*   BUN  11  9  8   CREATININE  0.56  0.45*  0.48*   CALCIUM  8.9  8.7  9.1     Recent Labs      12/19/17   0305  12/20/17   0333  12/21/17   0243   INR  1.44*  1.77*  1.80*     Recent Labs      12/21/17   0243   BNPBTYPENAT  161*              Assessment/Plan     * Atrial fibrillation with rapid ventricular response (CMS-HCC)- (present on admission)   Assessment & Plan    Patient had history of afib, s/p ablation, however reoccurred.   Continue PO verapamil, and digoxin. Titrate dose per cardiology.  Continue coumadin, measure INR daily.  EP following, continue digoxin and Tikosyn   Was in Sinus rhythm. Then converted to atrial fibrillation, per cardiology will cont tikosyn, no changes despite episodes  of afib 160s.         Obesity (BMI 30-39.9)- (present on admission)   Assessment & Plan    encouraged diet and exercise        CAD (coronary artery disease)- (present on admission)   Assessment & Plan    Elevated troponins 2/2 to rapid RVR.  HR controlled  Patient denies chest pain.        HTN (hypertension)- (present on admission)   Assessment & Plan    Stable , held home dose of lasix, lisinoril will resume when medically stable         Hyperthyroidism- (present on admission)   Assessment & Plan    TSH depressed, .030, Free T4 mildly elevated.  Previous TSH/Free T4 wnl.   Hold synthroid, will need lower dose and needs re-checking in 3-4 weeks.        Dysphagia- (present on admission)   Assessment & Plan    Difficulty swallowing occurred after ablation several days ago.  Barium swallow shows mod esophageal dysmotility.  Diet / Liquid Recommendation: regular diet   SLP re-revaluation done, regular diet okayed        Hypothyroidism- (present on admission)   Assessment & Plan    Stable holding medication for now.           Patient plan of care discussed at multidisplinary team rounds and with patient and R.N at beside.      Reviewed items::  Labs reviewed, Medications reviewed and EKG reviewed  Pruett catheter::  No Pruett  DVT prophylaxis pharmacological::  Warfarin (Coumadin)

## 2017-12-21 NOTE — PROGRESS NOTES
Received report and assumed care of patient. Patient is alert and oriented x4. Patient is in no signs of distress and denies pain at this time. Patient saw Dr. Matthews this am already. Probable discharge tomorrow.  Patient was updated on the plan of care for the day. Call light within reach, bed in low position, 2 side rails up. Educated on fall risk, verbalizes understanding. Will continue to monitor

## 2017-12-22 VITALS
DIASTOLIC BLOOD PRESSURE: 64 MMHG | RESPIRATION RATE: 18 BRPM | BODY MASS INDEX: 30.07 KG/M2 | SYSTOLIC BLOOD PRESSURE: 124 MMHG | TEMPERATURE: 97 F | OXYGEN SATURATION: 97 % | HEART RATE: 63 BPM | WEIGHT: 176.15 LBS | HEIGHT: 64 IN

## 2017-12-22 PROBLEM — R13.10 DYSPHAGIA: Status: RESOLVED | Noted: 2017-12-15 | Resolved: 2017-12-22

## 2017-12-22 LAB
ANION GAP SERPL CALC-SCNC: 11 MMOL/L (ref 0–11.9)
BASOPHILS # BLD AUTO: 0.8 % (ref 0–1.8)
BASOPHILS # BLD: 0.09 K/UL (ref 0–0.12)
BUN SERPL-MCNC: 9 MG/DL (ref 8–22)
CALCIUM SERPL-MCNC: 9.2 MG/DL (ref 8.5–10.5)
CHLORIDE SERPL-SCNC: 104 MMOL/L (ref 96–112)
CO2 SERPL-SCNC: 24 MMOL/L (ref 20–33)
CREAT SERPL-MCNC: 0.49 MG/DL (ref 0.5–1.4)
DIGOXIN SERPL-MCNC: 2.6 NG/ML (ref 0.8–2)
EKG IMPRESSION: NORMAL
EOSINOPHIL # BLD AUTO: 0.4 K/UL (ref 0–0.51)
EOSINOPHIL NFR BLD: 3.3 % (ref 0–6.9)
ERYTHROCYTE [DISTWIDTH] IN BLOOD BY AUTOMATED COUNT: 46 FL (ref 35.9–50)
GFR SERPL CREATININE-BSD FRML MDRD: >60 ML/MIN/1.73 M 2
GLUCOSE SERPL-MCNC: 93 MG/DL (ref 65–99)
HCT VFR BLD AUTO: 43.6 % (ref 37–47)
HGB BLD-MCNC: 14.1 G/DL (ref 12–16)
IMM GRANULOCYTES # BLD AUTO: 0.33 K/UL (ref 0–0.11)
IMM GRANULOCYTES NFR BLD AUTO: 2.8 % (ref 0–0.9)
INR PPP: 2.07 (ref 0.87–1.13)
LYMPHOCYTES # BLD AUTO: 4.38 K/UL (ref 1–4.8)
LYMPHOCYTES NFR BLD: 36.5 % (ref 22–41)
MAGNESIUM SERPL-MCNC: 1.8 MG/DL (ref 1.5–2.5)
MCH RBC QN AUTO: 30.7 PG (ref 27–33)
MCHC RBC AUTO-ENTMCNC: 32.3 G/DL (ref 33.6–35)
MCV RBC AUTO: 94.8 FL (ref 81.4–97.8)
MONOCYTES # BLD AUTO: 0.83 K/UL (ref 0–0.85)
MONOCYTES NFR BLD AUTO: 6.9 % (ref 0–13.4)
NEUTROPHILS # BLD AUTO: 5.97 K/UL (ref 2–7.15)
NEUTROPHILS NFR BLD: 49.7 % (ref 44–72)
NRBC # BLD AUTO: 0 K/UL
NRBC BLD-RTO: 0 /100 WBC
PLATELET # BLD AUTO: 341 K/UL (ref 164–446)
PMV BLD AUTO: 9.8 FL (ref 9–12.9)
POTASSIUM SERPL-SCNC: 3.8 MMOL/L (ref 3.6–5.5)
PROTHROMBIN TIME: 23 SEC (ref 12–14.6)
RBC # BLD AUTO: 4.6 M/UL (ref 4.2–5.4)
SODIUM SERPL-SCNC: 139 MMOL/L (ref 135–145)
WBC # BLD AUTO: 12 K/UL (ref 4.8–10.8)

## 2017-12-22 PROCEDURE — 36415 COLL VENOUS BLD VENIPUNCTURE: CPT

## 2017-12-22 PROCEDURE — 93010 ELECTROCARDIOGRAM REPORT: CPT | Performed by: INTERNAL MEDICINE

## 2017-12-22 PROCEDURE — 93005 ELECTROCARDIOGRAM TRACING: CPT | Performed by: HOSPITALIST

## 2017-12-22 PROCEDURE — G8989 SELF CARE D/C STATUS: HCPCS | Mod: CI

## 2017-12-22 PROCEDURE — 700102 HCHG RX REV CODE 250 W/ 637 OVERRIDE(OP): Performed by: HOSPITALIST

## 2017-12-22 PROCEDURE — G8988 SELF CARE GOAL STATUS: HCPCS | Mod: CI

## 2017-12-22 PROCEDURE — 80162 ASSAY OF DIGOXIN TOTAL: CPT

## 2017-12-22 PROCEDURE — A9270 NON-COVERED ITEM OR SERVICE: HCPCS | Performed by: NURSE PRACTITIONER

## 2017-12-22 PROCEDURE — 93010 ELECTROCARDIOGRAM REPORT: CPT | Mod: 77 | Performed by: INTERNAL MEDICINE

## 2017-12-22 PROCEDURE — G8987 SELF CARE CURRENT STATUS: HCPCS | Mod: CI

## 2017-12-22 PROCEDURE — 85025 COMPLETE CBC W/AUTO DIFF WBC: CPT

## 2017-12-22 PROCEDURE — 80048 BASIC METABOLIC PNL TOTAL CA: CPT

## 2017-12-22 PROCEDURE — 85610 PROTHROMBIN TIME: CPT

## 2017-12-22 PROCEDURE — 700102 HCHG RX REV CODE 250 W/ 637 OVERRIDE(OP): Performed by: NURSE PRACTITIONER

## 2017-12-22 PROCEDURE — 97165 OT EVAL LOW COMPLEX 30 MIN: CPT

## 2017-12-22 PROCEDURE — A9270 NON-COVERED ITEM OR SERVICE: HCPCS | Performed by: INTERNAL MEDICINE

## 2017-12-22 PROCEDURE — A9270 NON-COVERED ITEM OR SERVICE: HCPCS | Performed by: HOSPITALIST

## 2017-12-22 PROCEDURE — 83735 ASSAY OF MAGNESIUM: CPT

## 2017-12-22 PROCEDURE — 700102 HCHG RX REV CODE 250 W/ 637 OVERRIDE(OP): Performed by: INTERNAL MEDICINE

## 2017-12-22 PROCEDURE — 99239 HOSP IP/OBS DSCHRG MGMT >30: CPT | Performed by: HOSPITALIST

## 2017-12-22 RX ORDER — WARFARIN SODIUM 7.5 MG/1
3.75 TABLET ORAL
Status: DISCONTINUED | OUTPATIENT
Start: 2017-12-22 | End: 2017-12-22 | Stop reason: HOSPADM

## 2017-12-22 RX ORDER — LEVOTHYROXINE SODIUM 0.05 MG/1
50 TABLET ORAL
Qty: 30 TAB | Refills: 0 | Status: SHIPPED | OUTPATIENT
Start: 2017-12-22 | End: 2018-01-09 | Stop reason: SDUPTHER

## 2017-12-22 RX ORDER — ATENOLOL 25 MG/1
25 TABLET ORAL
Status: DISCONTINUED | OUTPATIENT
Start: 2017-12-22 | End: 2017-12-22 | Stop reason: HOSPADM

## 2017-12-22 RX ORDER — ATENOLOL 25 MG/1
25 TABLET ORAL DAILY
Qty: 30 TAB | Refills: 6 | Status: SHIPPED | OUTPATIENT
Start: 2017-12-23 | End: 2018-01-03 | Stop reason: SDUPTHER

## 2017-12-22 RX ORDER — ATENOLOL 25 MG/1
25 TABLET ORAL DAILY
Qty: 30 TAB | Refills: 0 | Status: SHIPPED | OUTPATIENT
Start: 2017-12-23 | End: 2017-12-22

## 2017-12-22 RX ADMIN — ASPIRIN 81 MG: 81 TABLET, CHEWABLE ORAL at 09:08

## 2017-12-22 RX ADMIN — ATENOLOL 25 MG: 25 TABLET ORAL at 09:08

## 2017-12-22 RX ADMIN — MAGNESIUM GLUCONATE 500 MG ORAL TABLET 400 MG: 500 TABLET ORAL at 09:08

## 2017-12-22 RX ADMIN — LISINOPRIL 5 MG: 5 TABLET ORAL at 09:08

## 2017-12-22 RX ADMIN — FUROSEMIDE 40 MG: 40 TABLET ORAL at 06:29

## 2017-12-22 RX ADMIN — POTASSIUM CHLORIDE 20 MEQ: 1500 TABLET, EXTENDED RELEASE ORAL at 09:08

## 2017-12-22 RX ADMIN — DOFETILIDE 500 MCG: 0.5 CAPSULE ORAL at 09:08

## 2017-12-22 RX ADMIN — GUAIFENESIN AND DEXTROMETHORPHAN 5 ML: 100; 10 SYRUP ORAL at 00:05

## 2017-12-22 ASSESSMENT — ENCOUNTER SYMPTOMS
CHILLS: 0
BLURRED VISION: 0
VOMITING: 0
SORE THROAT: 0
HEADACHES: 0
WHEEZING: 0
BRUISES/BLEEDS EASILY: 0
ABDOMINAL PAIN: 0
FOCAL WEAKNESS: 0
CLAUDICATION: 0
ORTHOPNEA: 0
PALPITATIONS: 1
HEARTBURN: 0
SHORTNESS OF BREATH: 1
MYALGIAS: 0
DIZZINESS: 0
COUGH: 0
PSYCHIATRIC NEGATIVE: 1
BLOOD IN STOOL: 0
DOUBLE VISION: 0
FEVER: 0
NAUSEA: 0
LOSS OF CONSCIOUSNESS: 0
PND: 0
SPEECH CHANGE: 0

## 2017-12-22 ASSESSMENT — COGNITIVE AND FUNCTIONAL STATUS - GENERAL
DRESSING REGULAR LOWER BODY CLOTHING: A LITTLE
DAILY ACTIVITIY SCORE: 22
HELP NEEDED FOR BATHING: A LITTLE
SUGGESTED CMS G CODE MODIFIER DAILY ACTIVITY: CJ

## 2017-12-22 ASSESSMENT — PAIN SCALES - GENERAL
PAINLEVEL_OUTOF10: 0
PAINLEVEL_OUTOF10: 4
PAINLEVEL_OUTOF10: 0

## 2017-12-22 ASSESSMENT — ACTIVITIES OF DAILY LIVING (ADL): TOILETING: INDEPENDENT

## 2017-12-22 NOTE — PROGRESS NOTES
Pt received on her room AxOx4 with PIV on her left FA patent SL. Generalized bruising noted with small skin tear on her right upper arm, she said present on admission. Pt ambulating well with SBA with FWW. Saturating 95-97% on 1L/NC. Needs attended. Call light within reach. Hourly rounding practiced.

## 2017-12-22 NOTE — DIETARY
Nutrition Services:  Care Plan Update     Pt with care plan today for PO intake ~ 50%.  PO for last four meals >50%.  Consult RD as needed.  RD will re-screen weekly.    RD available PRN.

## 2017-12-22 NOTE — CARE PLAN
Problem: Nutritional:  Goal: Achieve adequate nutritional intake  Patient will consume >50% of meals   Outcome: MET Date Met: 12/22/17  PO 50% for the last four meals.

## 2017-12-22 NOTE — DISCHARGE SUMMARY
CHIEF COMPLAINT ON ADMISSION  Chief Complaint   Patient presents with   • Difficulty Swallowing       CODE STATUS  Full Code    HPI & HOSPITAL COURSE  77 y.o. female with Hx of atrial fibrillation recently underwent a ablation, easadmitted 12/15/2017 with atrial fibrillation with RVR dysphagia. Hence on admission cardiology was consulted. Patient was placed on a verapamil drip. Hence Tikosyn was recommended and started. During this time patient complained of having dysphagia, especially after being intubated for ablation process, as a result speech therapy evaluated patient and worked with her until patient was cleared for a regular diet. In temrs of her heart rate, we had difficulty having strict control. Cardiology discontinued atenolol and started digoxin. Over the past several days patient has been having instances of heart rate acceleration to 150s-170s only last short periods of time and then reverting back to sinus. At this point despite daily findings, cardiology is okay with current heart rate, and they believe in time patient will have better heart rate control. Currently, patient is asymptomatic, she is able to ambulate without difficulty. Patient denies fevers/chills, chest pain, shortness of breath or nausea/vommiting.     Of note, patient TSH was .030 and Free T4: 1.78. Her synthroid has been held, and her dose has been reduced to 50mcg daily, I have instructed patient to wait a week before resuming and even then to follow up with her PCP In 1 week.      Therefore, she is discharged in good and stable condition with close outpatient follow-up.    SPECIFIC OUTPATIENT FOLLOW-UP  PCP in 1 week  Cardiology clinic in 1-2 weeks or sooner if needed.      DISCHARGE PROBLEM LIST  Principal Problem:    Atrial fibrillation with rapid ventricular response (CMS-HCC) POA: Yes  Active Problems:    HTN (hypertension) POA: Yes    CAD (coronary artery disease) POA: Yes      Overview: Angiogram 2012:1. A 70% mid right  coronary artery eccentric stenosis.       2. A 40% proximal left anterior descending artery stenosis.          Obesity (BMI 30-39.9) POA: Yes    Hypothyroidism POA: Yes    Hyperthyroidism POA: Yes  Resolved Problems:    Dysphagia POA: Yes      FOLLOW UP  Future Appointments  Date Time Provider Department Center   12/27/2017 2:40 PM VICTORIANO Bradley [facility] None     No follow-up provider specified.    MEDICATIONS ON DISCHARGE     Medication List      START taking these medications      Instructions   atenolol 25 MG Tabs  Start taking on:  12/23/2017  Commonly known as:  TENORMIN   Take 1 Tab by mouth every day.  Dose:  25 mg     dofetilide 500 MCG Caps  Commonly known as:  TIKOSYN   Take 1 Cap by mouth every 12 hours.  Dose:  500 mcg     magnesium oxide 400 (241.3 Mg) MG Tabs tablet  Start taking on:  12/23/2017  Commonly known as:  MAG-OX   Take 1 Tab by mouth every day.  Dose:  400 mg        CHANGE how you take these medications      Instructions   levothyroxine 50 MCG Tabs  What changed:  See the new instructions.  Commonly known as:  SYNTHROID   Take 1 Tab by mouth Every morning on an empty stomach.  Dose:  50 mcg        CONTINUE taking these medications      Instructions   acetaminophen 500 MG Tabs  Commonly known as:  TYLENOL   Take 1,000 mg by mouth 3 times a day. Indications: Pain  Dose:  1000 mg     Diclofenac Sodium 1 % Gel   Apply  to affected area(s) 2 times a day as needed (For joint pain).     digoxin 125 MCG Tabs  Commonly known as:  LANOXIN   Take 1 Tab by mouth every day.  Dose:  125 mcg     docusate sodium 100 MG Caps  Commonly known as:  COLACE   Take 200 mg by mouth every evening.  Dose:  200 mg     estradiol 2 MG Tabs  Commonly known as:  ESTRACE   TAKE ONE TABLET BY MOUTH EVERY DAY     furosemide 40 MG Tabs  Commonly known as:  LASIX   Take 40-80 mg by mouth 2 Times a Day. 80 mg in the Am and 40 mg at Noon  Dose:  40-80 mg     ICAPS AREDS 2 Caps   Take 2 Caps by mouth every day.  Dose:   2 Cap     lisinopril 5 MG Tabs  Commonly known as:  PRINIVIL   Doctor's comments:  Called in to [facility] 6/25/2017 via [facility] system  Take 1 Tab by mouth every day.  Dose:  5 mg     magnesium oxide 400 MG Tabs  Commonly known as:  MAG-OX   TAKE ONE TABLET BY MOUTH EVERY DAY     potassium chloride SA 20 MEQ Tbcr  Commonly known as:  Kdur   Take 20 mEq by mouth 3 times a day.  Dose:  20 mEq     ranitidine 150 MG Tabs  Commonly known as:  ZANTAC   Take 1 Tab by mouth every day.  Dose:  150 mg     sennosides-docusate sodium 8.6-50 MG tablet  Commonly known as:  SENOKOT-S   Take 3 Tabs by mouth every day.  Dose:  3 Tab     sertraline 25 MG tablet  Commonly known as:  ZOLOFT   Take 1 Tab by mouth every day.  Dose:  25 mg     vitamin D 1000 UNIT Tabs  Commonly known as:  cholecalciferol   Take 2,000 Units by mouth every day.  Dose:  2000 Units     warfarin 2.5 MG Tabs  Commonly known as:  COUMADIN   Take 2.5-3.75 mg by mouth every day. 3.75 mg on Monday and Wednesday 2.5 mg all other days  Dose:  2.5-3.75 mg        STOP taking these medications    verapamil 120 MG Tabs  Commonly known as:  ISOPTIN              DIET  Orders Placed This Encounter   Procedures   • DIET ORDER     Standing Status:   Standing     Number of Occurrences:   1     Order Specific Question:   Diet:     Answer:   Cardiac [6]       ACTIVITY  As tolerated.  Weight bearing as tolerated      CONSULTATIONS  Cardiology    PROCEDURES  None    LABORATORY  Lab Results   Component Value Date/Time    SODIUM 139 12/22/2017 01:57 AM    POTASSIUM 3.8 12/22/2017 01:57 AM    CHLORIDE 104 12/22/2017 01:57 AM    CO2 24 12/22/2017 01:57 AM    GLUCOSE 93 12/22/2017 01:57 AM    BUN 9 12/22/2017 01:57 AM    CREATININE 0.49 (L) 12/22/2017 01:57 AM    CREATININE 0.78 07/29/2010 12:00 AM    GLOMRATE >59 07/29/2010 12:00 AM        Lab Results   Component Value Date/Time    WBC 12.0 (H) 12/22/2017 01:57 AM    WBC 9.3 07/29/2010 12:00 AM    HEMOGLOBIN 14.1 12/22/2017  01:57 AM    HEMATOCRIT 43.6 12/22/2017 01:57 AM    PLATELETCT 341 12/22/2017 01:57 AM        Total time of the discharge process exceeds 40 minutes

## 2017-12-22 NOTE — DISCHARGE PLANNING
Medical Social Work    Confirmed with [facility] pt's Tikosyn is filled and ready to be picked up today.

## 2017-12-22 NOTE — PROGRESS NOTES
Monitor Summary: Afib in and out SR-ST; Afib 133-178 went up to 200's @1900, XX-IU-, frequent PVC's. .14/.08/.40.

## 2017-12-22 NOTE — THERAPY
"Occupational Therapy Evaluation completed.   Functional Status: Supv supine > < EOB, supv transfers with FWW, supv toileting, supv standing grooming, min A LB dressing   Plan of Care: Patient with no further skilled OT needs in the acute care setting at this time  Discharge Recommendations:  Equipment: No Equipment Needed. Post-acute therapy Discharge to home with outpatient or home health for additional skilled therapy services.    See \"Rehab Therapy-Acute\" Patient Summary Report for complete documentation.    77 y.o. female with h/o CAD, HTN, lupus, who underwent ablation on 12/13, then presented with swallowing difficulty. Dx with a-fib with RVR. Seen now for OT eval. Pt completed transfers, amb to/from bathroom using FWW, toileting, standing grooming, bed mobility. Pt's home lacks rails for multilple stairs (tri-level home). Educated pt and spouse on fall prevention and strongly encouraged installment of rails. Both agree with recommendation. Educated on option of sock-aid, but pt prefers to have assist from spouse. Pt has shower chair in place and uses for bathing. Pt appears to be at or near baseline function from OT standpoint. No further acute OT needs at this time.     "

## 2017-12-22 NOTE — PROGRESS NOTES
Discharge instructions given to patient at bedside, verbalizes understanding and states plans for follow-up with cardiology.Prescriptions handed and sent to patients pharmacy.  New and home medication review, post-discharge activity level and worsening of symptoms needing follow-up care discussed. Telemetry monitor/IV cathlon removed. All belongings accounted for, all questions answered at this time. Patient left in a car, with , to go home.

## 2017-12-22 NOTE — PROGRESS NOTES
"Pt c/o chest pain on her right side under her right breast, pt describes it \" sharp pain\". EKG ordered per protocol.   "

## 2017-12-22 NOTE — PROGRESS NOTES
ALONA from Lab called with critical result of Digoxin at 0354. Critical lab result read back Shannon  .   Dr. Nowak notified of critical lab result at 0359.  Critical lab result read back by Dr. Nowak.

## 2017-12-22 NOTE — CARE PLAN
Problem: Safety  Goal: Free from accidental injury  Calls appropriately. Call light within reach. Hourly rounding practiced.     Problem: Risk for Deep Vein Thrombosis/Venous Thromboembolism  Goal: DVT/VTE Prevention Measures in Place   Lovenox SQ given per order.    Goal: Patient participates in DVT/VTE Prevention Measures  Intervention: Ambulates in Hallway three times per day  Ambulated to the BR, Denies any discomfort when walking.

## 2017-12-22 NOTE — PROGRESS NOTES
Monitor tech notified this RN that pt is sustaining to 160's. Pt is asymptomatic at this time. Denies any discomfort. Paged and spoke to DR. Richards, no new order received but told this RN to keep monitoring pt.

## 2017-12-22 NOTE — PROGRESS NOTES
Received report and assumed care of patient. Patient is alert and oriented x4. Patient is in no signs of distress and denies pain at this time. Patient is currently running SR. Patient has been tachycardic throughout the night, cards is aware.  Patient was updated on the plan of care for the day. Call light within reach, bed in low position, 2 side rails up. Educated on fall risk, verbalizes understanding. Will continue to monitor

## 2017-12-22 NOTE — PROGRESS NOTES
Cardiology Progress Note               Author: Mana Byrne Date & Time created: 12/22/2017  10:09 AM     Interval History:  Patient seen on EPS rounds.  ADmitted for difficutly swallowing after PVI for AF.  She was started onTikosyn and has completed her 5 doses of the drug and QTc has remained stable at 420 ms.  Rhythm continues in and out of AF. VRR up to 180 bpm with sinus rates low in the 70's. Atenolol added and digoxin added back.  Reportedly high digoxin level, doubtful with normal renal function.  From EPS perspective can be discharged home will be seen in follow up next week in our office.  Medications written for per EPS.    Review of Systems   Constitutional: Negative for chills and fever.   HENT: Negative for sore throat.         No difficulty swallowing   Eyes: Negative for blurred vision and double vision.   Respiratory: Positive for shortness of breath. Negative for cough and wheezing.    Cardiovascular: Positive for chest pain and palpitations. Negative for orthopnea, claudication, leg swelling and PND.   Gastrointestinal: Negative for abdominal pain, blood in stool, heartburn, nausea and vomiting.   Genitourinary: Negative for dysuria, frequency, hematuria and urgency.   Musculoskeletal: Negative for myalgias.   Skin: Negative.    Neurological: Negative for dizziness, speech change, focal weakness, loss of consciousness and headaches.   Endo/Heme/Allergies: Does not bruise/bleed easily.   Psychiatric/Behavioral: Negative.        Physical Exam   Constitutional: She is oriented to person, place, and time. She appears well-developed and well-nourished.   HENT:   Head: Normocephalic and atraumatic.   Eyes: Pupils are equal, round, and reactive to light.   Neck: Normal range of motion. Neck supple. No thyromegaly present.   Cardiovascular: Normal rate, regular rhythm and intact distal pulses.  Exam reveals no gallop and no friction rub.    Murmur heard.  Pulmonary/Chest: Effort normal and breath  sounds normal. No respiratory distress. She has no wheezes. She has no rales. She exhibits no tenderness.   Abdominal: Soft. Bowel sounds are normal. She exhibits no distension. There is no tenderness. There is no guarding.   Musculoskeletal: Normal range of motion. She exhibits edema.   Neurological: She is alert and oriented to person, place, and time.   Skin: Skin is warm and dry.   Psychiatric: She has a normal mood and affect.       Hemodynamics:  Temp (24hrs), Av.1 °C (96.9 °F), Min:35.8 °C (96.5 °F), Max:36.2 °C (97.1 °F)  Temperature: 35.8 °C (96.5 °F)  Pulse  Av.1  Min: 54  Max: 170  Blood Pressure : 138/89     Respiratory:    Respiration: 18, Pulse Oximetry: 95 %     Work Of Breathing / Effort: Mild  RUL Breath Sounds: Diminished, RML Breath Sounds: Diminished, RLL Breath Sounds: Diminished, DO Breath Sounds: Diminished, LLL Breath Sounds: Diminished  Fluids:     Weight: 79.9 kg (176 lb 2.4 oz)  GI/Nutrition:  Orders Placed This Encounter   Procedures   • DIET ORDER     Standing Status:   Standing     Number of Occurrences:   1     Order Specific Question:   Diet:     Answer:   Cardiac [6]     Lab Results:  Recent Labs      17   1115  17   WBC  12.7*  10.0  12.0*   RBC  4.55  4.07*  4.60   HEMOGLOBIN  14.2  13.0  14.1   HEMATOCRIT  44.0  39.3  43.6   MCV  96.7  96.6  94.8   MCH  31.2  31.9  30.7   MCHC  32.3*  33.1*  32.3*   RDW  47.5  47.0  46.0   PLATELETCT  332  299  341   MPV  9.6  10.0  9.8     Recent Labs      17   1044  17   015   SODIUM  137  139  139   POTASSIUM  3.8  3.8  3.8   CHLORIDE  101  104  104   CO2  29  27  24   GLUCOSE  178*  103*  93   BUN  9  8  9   CREATININE  0.45*  0.48*  0.49*   CALCIUM  8.7  9.1  9.2     Recent Labs      17   0333  17   0243  17   015   INR  1.77*  1.80*  2.07*     Recent Labs      17   0243   BNPBTYPENAT  161*     Recent Labs      17   BNPBTYPENAT   161*             Medical Decision Making, by Problem:  Active Hospital Problems    Diagnosis   • *Atrial fibrillation with rapid ventricular response (CMS-HCC) [I48.91]   • Obesity (BMI 30-39.9) [E66.9]   • CAD (coronary artery disease) [I25.10]   • HTN (hypertension) [I10]   • Hypothyroidism [E03.9]   • Hyperthyroidism [E05.90]   • Dysphagia [R13.10]       Plan:  In and out of AF on Tikosyn . EKG's stable. Swallowing issue resolved. Breathing better back on diuretics.  Low dose BB added and digoxin for VRR.  QTc stable at 420 ms.   to determine where to get Tikosyn Rx. [facility] has RX  EPS will sign off .  Follow up early next week in our office has been arranged.        Reviewed items::  EKG reviewed, Labs reviewed and Medications reviewed

## 2017-12-22 NOTE — PROGRESS NOTES
Inpatient Anticoagulation Service Note    Date: 12/22/2017  Reason for Anticoagulation: Atrial Fibrillation   [id number] VASc Score: 5    Hemoglobin Value: 14.1  Hematocrit Value: 43.6  Lab Platelet Value: 341  Target INR: 2.0 to 3.0    INR from last 7 days     Date/Time INR Value    12/22/17 0157 (!)  2.07    12/21/17 0243 (!)  1.8    12/20/17 0333 (!)  1.77    12/19/17 0305 (!)  1.44    12/18/17 1005 (!)  1.43    12/15/17 1350 (!)  2.6        Dose from last 7 days     Date/Time Dose (mg)    12/20/17 1200  3.75    12/19/17 0800  2.5    12/18/17 1100  5        Average Dose (mg):  (home dose: 3.75 mg MW, 2.5 mg AOD )  Significant Interactions: Aspirin  Bridge Therapy: Yes     HPI: 78 yo female admitted 12/15/17 for difficulty swallowing s/p ablation on Monday 12/11/17 whereby Afib RVR re-occurred. Cardiac EP initiated Tikosyn yesterday (12/18). Patient is chronically anticoagulated outpatient with warfarin therapy for h/o Afib. Warfarin managed by the [facility] and per records, patient is prescribed warfarin 3.75mg (one + one-half tablet) po every Monday and Thursday and warfarin 2.5mg (one tablet) po on all other days. Recent INRs on file from [facility] within therapeutic range on this regimen. No new potential drug-drug interactions identified. Patient also takes aspirin for CAD - established interaction. Bridging back to therapeutic INR with therapeutic lovenox.       Assessment: INR @ goal.     Plan:  Dr Matthews ordered warfarin 5mg po x one 12/21/17 to achieve therapeutic goal more quickly and prepare for discharge. Cardiology team does not want to discharge patient home with lovenox.  Warfarin 5mg po tonight as ordered by physician and then will discharge home tomorrow with usual home dosing regimen.  Warfarin 3.75 mg po x1 tonight.      Education Material Provided?: No (Chronic warfarin patient)     Pharmacist suggested discharge dosing: warfarin 3.75mg (one + one-half tablet) po every Monday and Thursday and warfarin 2.5mg  (one tablet) po on all other days with f/u appointment with the [facility].      Jamal GreenD BCPS

## 2017-12-22 NOTE — DISCHARGE INSTRUCTIONS
Discharge Instructions    Discharged to home by car with relative. Discharged via wheelchair, hospital escort: Yes.  Special equipment needed: Not Applicable    Be sure to schedule a follow-up appointment with your primary care doctor or any specialists as instructed.     Discharge Plan:   Post ablation instructions: No lifting > 10 lbs x 1 week.  No baths or hot tubs x 1 week.   Continue to monitor sites daily for warmth, redness, discolored drainage   Please walk and take deep breaths after discharge.   After discharge if you experience  Increasing chest pain, Increasing shortness of breath, hemoptysis, neurological changes, high fever 101 or greater, pre syncope/syncope, trouble with catheter sites needs to be seen in the emergency dept.  Influenza Vaccine Indication: Not indicated: Previously immunized this influenza season and > 8 years of age    I understand that a diet low in cholesterol, fat, and sodium is recommended for good health. Unless I have been given specific instructions below for another diet, I accept this instruction as my diet prescription.   Other diet:     Special Instructions: None    · Is patient discharged on Warfarin / Coumadin?   Yes    You are receiving the drug warfarin. Please understand the importance of monitoring warfarin with scheduled PT/INR blood draws.  Follow-up with a call to your personal Doctor's office in 3 days to schedule a PT/INR. .    IMPORTANT: HOW TO USE THIS INFORMATION:  This is a summary and does NOT have all possible information about this product. This information does not assure that this product is safe, effective, or appropriate for you. This information is not individual medical advice and does not substitute for the advice of your health care professional. Always ask your health care professional for complete information about this product and your specific health needs.      WARFARIN - ORAL (WARF-uh-rin)      COMMON BRAND NAME(S): Coumadin      WARNING:   "Warfarin can cause very serious (possibly fatal) bleeding. This is more likely to occur when you first start taking this medication or if you take too much warfarin. To decrease your risk for bleeding, your doctor or other health care provider will monitor you closely and check your lab results (INR test) to make sure you are not taking too much warfarin. Keep all medical and laboratory appointments. Tell your doctor right away if you notice any signs of serious bleeding. See also Side Effects section.      USES:  This medication is used to treat blood clots (such as in deep vein thrombosis-DVT or pulmonary embolus-PE) and/or to prevent new clots from forming in your body. Preventing harmful blood clots helps to reduce the risk of a stroke or heart attack. Conditions that increase your risk of developing blood clots include a certain type of irregular heart rhythm (atrial fibrillation), heart valve replacement, recent heart attack, and certain surgeries (such as hip/knee replacement). Warfarin is commonly called a \"blood thinner,\" but the more correct term is \"anticoagulant.\" It helps to keep blood flowing smoothly in your body by decreasing the amount of certain substances (clotting proteins) in your blood.      HOW TO USE:  Read the Medication Guide provided by your pharmacist before you start taking warfarin and each time you get a refill. If you have any questions, ask your doctor or pharmacist. Take this medication by mouth with or without food as directed by your doctor or other health care professional, usually once a day. It is very important to take it exactly as directed. Do not increase the dose, take it more frequently, or stop using it unless directed by your doctor. Dosage is based on your medical condition, laboratory tests (such as INR), and response to treatment. Your doctor or other health care provider will monitor you closely while you are taking this medication to determine the right dose for " you. Use this medication regularly to get the most benefit from it. To help you remember, take it at the same time each day. It is important to eat a balanced, consistent diet while taking warfarin. Some foods can affect how warfarin works in your body and may affect your treatment and dose. Avoid sudden large increases or decreases in your intake of foods high in vitamin K (such as broccoli, cauliflower, cabbage, brussels sprouts, kale, spinach, and other green leafy vegetables, liver, green tea, certain vitamin supplements). If you are trying to lose weight, check with your doctor before you try to go on a diet. Cranberry products may also affect how your warfarin works. Limit the amount of cranberry juice (16 ounces/480 milliliters a day) or other cranberry products you may drink or eat.      SIDE EFFECTS:  Nausea, loss of appetite, or stomach/abdominal pain may occur. If any of these effects persist or worsen, tell your doctor or pharmacist promptly. Remember that your doctor has prescribed this medication because he or she has judged that the benefit to you is greater than the risk of side effects. Many people using this medication do not have serious side effects. This medication can cause serious bleeding if it affects your blood clotting proteins too much (shown by unusually high INR lab results). Even if your doctor stops your medication, this risk of bleeding can continue for up to a week. Tell your doctor right away if you have any signs of serious bleeding, including: unusual pain/swelling/discomfort, unusual/easy bruising, prolonged bleeding from cuts or gums, persistent/frequent nosebleeds, unusually heavy/prolonged menstrual flow, pink/dark urine, coughing up blood, vomit that is bloody or looks like coffee grounds, severe headache, dizziness/fainting, unusual or persistent tiredness/weakness, bloody/black/tarry stools, chest pain, shortness of breath, difficulty swallowing. Tell your doctor right  away if any of these unlikely but serious side effects occur: persistent nausea/vomiting, severe stomach/abdominal pain, yellowing eyes/skin. This drug rarely has caused very serious (possibly fatal) problems if its effects lead to small blood clots (usually at the beginning of treatment). This can lead to severe skin/tissue damage that may require surgery or amputation if left untreated. Patients with certain blood conditions (protein C or S deficiency) may be at greater risk. Get medical help right away if any of these rare but serious side effects occur: painful/red/purplish patches on the skin (such as on the toe, breast, abdomen), change in the amount of urine, vision changes, confusion, slurred speech, weakness on one side of the body. A very serious allergic reaction to this drug is rare. However, get medical help right away if you notice any symptoms of a serious allergic reaction, including: rash, itching/swelling (especially of the face/tongue/throat), severe dizziness, trouble breathing. This is not a complete list of possible side effects. If you notice other effects not listed above, contact your doctor or pharmacist. In the [place] - Call your doctor for medical advice about side effects. You may report side effects to FDA at 1-354-NJT-5145. In [place] - Call your doctor for medical advice about side effects. You may report side effects to [facility] [place] at 1-552.137.5352.      PRECAUTIONS:  Before taking warfarin, tell your doctor or pharmacist if you are allergic to it; or if you have any other allergies. This product may contain inactive ingredients, which can cause allergic reactions or other problems. Talk to your pharmacist for more details. Before using this medication, tell your doctor or pharmacist your medical history, especially of: blood disorders (such as anemia, hemophilia), bleeding problems (such as bleeding of the stomach/intestines, bleeding in the brain), blood vessel disorders (such as  aneurysms), recent major injury/surgery, liver disease, alcohol use, mental/mood disorders (including memory problems), frequent falls/injuries. It is important that all your doctors and dentists know that you take warfarin. Before having surgery or any medical/dental procedures, tell your doctor or dentist that you are taking this medication and about all the products you use (including prescription drugs, nonprescription drugs, and herbal products). Avoid getting injections into the muscles. If you must have an injection into a muscle (for example, a flu shot), it should be given in the arm. This way, it will be easier to check for bleeding and/or apply pressure bandages. This medication may cause stomach bleeding. Daily use of alcohol while using this medicine will increase your risk for stomach bleeding and may also affect how this medication works. Limit or avoid alcoholic beverages. If you have not been eating well, if you have an illness or infection that causes fever, vomiting, or diarrhea for more than 2 days, or if you start using any antibiotic medications, contact your doctor or pharmacist immediately because these conditions can affect how warfarin works. This medication can cause heavy bleeding. To lower the chance of getting cut, bruised, or injured, use great caution with sharp objects like safety razors and nail cutters. Use an electric razor when shaving and a soft toothbrush when brushing your teeth. Avoid activities such as contact sports. If you fall or injure yourself, especially if you hit your head, call your doctor immediately. Your doctor may need to check you. The Food & Drug Administration has stated that generic warfarin products are interchangeable. However, consult your doctor or pharmacist before switching warfarin products. Be careful not to take more than one medication that contains warfarin unless specifically directed by the doctor or health care provider who is monitoring your  "warfarin treatment. Older adults may be at greater risk for bleeding while using this drug. This medication is not recommended for use during pregnancy because of serious (possibly fatal) harm to an unborn baby. Discuss the use of reliable forms of birth control with your doctor. If you become pregnant or think you may be pregnant, tell your doctor immediately. If you are planning pregnancy, discuss a plan for managing your condition with your doctor before you become pregnant. Your doctor may switch the type of medication you use during pregnancy. Very small amounts of this medication may pass into breast milk but is unlikely to harm a nursing infant. Consult your doctor before breast-feeding.      DRUG INTERACTIONS:  Drug interactions may change how your medications work or increase your risk for serious side effects. This document does not contain all possible drug interactions. Keep a list of all the products you use (including prescription/nonprescription drugs and herbal products) and share it with your doctor and pharmacist. Do not start, stop, or change the dosage of any medicines without your doctor's approval. Warfarin interacts with many prescription, nonprescription, vitamin, and herbal products. This includes medications that are applied to the skin or inside the vagina or rectum. The interactions with warfarin usually result in an increase or decrease in the \"blood-thinning\" (anticoagulant) effect. Your doctor or other health care professional should closely monitor you to prevent serious bleeding or clotting problems. While taking warfarin, it is very important to tell your doctor or pharmacist of any changes in medications, vitamins, or herbal products that you are taking. Some products that may interact with this drug include: capecitabine, imatinib, mifepristone. Aspirin, aspirin-like drugs (salicylates), and nonsteroidal anti-inflammatory drugs (NSAIDs such as ibuprofen, naproxen, celecoxib) may " have effects similar to warfarin. These drugs may increase the risk of bleeding problems if taken during treatment with warfarin. Carefully check all prescription/nonprescription product labels (including drugs applied to the skin such as pain-relieving creams) since the products may contain NSAIDs or salicylates. Talk to your doctor about using a different medication (such as acetaminophen) to treat pain/fever. Low-dose aspirin and related drugs (such as clopidogrel, ticlopidine) should be continued if prescribed by your doctor for specific medical reasons such as heart attack or stroke prevention. Consult your doctor or pharmacist for more details. Many herbal products interact with warfarin. Tell your doctor before taking any herbal products, especially bromelains, coenzyme Q10, cranberry, danshen, dong quai, fenugreek, garlic, ginkgo biloba, ginseng, and Bodega's wort, among others. This medication may interfere with a certain laboratory test to measure theophylline levels, possibly causing false test results. Make sure laboratory personnel and all your doctors know you use this drug.      OVERDOSE:  If overdose is suspected, contact a poison control center or emergency room immediately. [place] residents can call the [place] National Poison Hotline at 1-444.376.1021. [place] residents can call a provincial poison control center. Symptoms of overdose may include: bloody/black/tarry stools, pink/dark urine, unusual/prolonged bleeding.      NOTES:  Do not share this medication with others. Laboratory and/or medical tests (such as INR, complete blood count) must be performed periodically to monitor your progress or check for side effects. Consult your doctor for more details.      MISSED DOSE:  For the best possible benefit, do not miss any doses. If you do miss a dose and remember on the same day, take it as soon as you remember. If you remember on the next day, skip the missed dose and resume your usual dosing schedule.  Do not double the dose to catch up because this could increase your risk for bleeding. Keep a record of missed doses to give to your doctor or pharmacist. Contact your doctor or pharmacist if you miss 2 or more doses in a row.      STORAGE:  Store at room temperature away from light and moisture. Do not store in the bathroom. Keep all medications away from children and pets. Do not flush medications down the toilet or pour them into a drain unless instructed to do so. Properly discard this product when it is  or no longer needed. Consult your pharmacist or local waste disposal company for more details about how to safely discard your product.      MEDICAL ALERT:  Your condition and medication can cause complications in a medical emergency. For information about enrolling in MedicAlert, call 1-933.953.8616 ([place]) or 1-476.790.9238 ([place]).      Information last revised 2010 Copyright(c)  [employer]             · Is patient Post Blood Transfusion?  No    Depression / Suicide Risk    As you are discharged from this [facility] facility, it is important to learn how to keep safe from harming yourself.    Recognize the warning signs:  · Abrupt changes in personality, positive or negative- including increase in energy   · Giving away possessions  · Change in eating patterns- significant weight changes-  positive or negative  · Change in sleeping patterns- unable to sleep or sleeping all the time   · Unwillingness or inability to communicate  · Depression  · Unusual sadness, discouragement and loneliness  · Talk of wanting to die  · Neglect of personal appearance   · Rebelliousness- reckless behavior  · Withdrawal from people/activities they love  · Confusion- inability to concentrate     If you or a loved one observes any of these behaviors or has concerns about self-harm, here's what you can do:  · Talk about it- your feelings and reasons for harming yourself  · Remove any means that you  might use to hurt yourself (examples: pills, rope, extension cords, firearm)  · Get professional help from the community (Mental Health, Substance Abuse, psychological counseling)  · Do not be alone:Call your Safe Contact- someone whom you trust who will be there for you.  · Call your local CRISIS HOTLINE 473-3192 or 167-563-2114  · Call your local Children's Mobile Crisis Response Team [place] (545) 468-5223 or [employer]  · Call the toll free National Suicide Prevention Hotlines   · National Suicide Prevention Lifeline 803-745-GLEV (6195)  · National Hope Line Network 800-SUICIDE (096-1471)    Atenolol tablets  What is this medicine?  ATENOLOL (a TEN oh lole) is a beta-blocker. Beta-blockers reduce the workload on the heart and help it to beat more regularly. This medicine is used to treat high blood pressure and to prevent chest pain. It is also used to protect the heart during a heart attack and to prevent an additional heart attack from occurring.  This medicine may be used for other purposes; ask your health care provider or pharmacist if you have questions.  COMMON BRAND NAME(S): Tenormin  What should I tell my health care provider before I take this medicine?  They need to know if you have any of these conditions:  -diabetes  -heart or vessel disease like slow heart rate, worsening heart failure, heart block, sick sinus syndrome or Raynaud's disease  -kidney disease  -lung or breathing disease, like asthma or emphysema  -pheochromocytoma  -thyroid disease  -an unusual or allergic reaction to atenolol, other beta-blockers, medicines, foods, dyes, or preservatives  -pregnant or trying to get pregnant  -breast-feeding  How should I use this medicine?  Take this medicine by mouth with a drink of water. Follow the directions on the prescription label. This medicine may be taken with or without food. Take your medicine at regular intervals. Do not take more medicine than directed. Do not stop  taking this medicine suddenly. This could lead to serious heart-related effects.  Talk to your pediatrician regarding the use of this medicine in children. Special care may be needed.  Overdosage: If you think you have taken too much of this medicine contact a poison control center or emergency room at once.  NOTE: This medicine is only for you. Do not share this medicine with others.  What if I miss a dose?  If you miss a dose, take it as soon as you can. If it is almost time for your next dose, take only that dose. Do not take double or extra doses.  What may interact with this medicine?  Do not take this medicine with any of the following medications:  -sotalol  This medicine may also interact with the following medications:  -clonidine  -digoxin  -diuretics  -dobutamine  -epinephrine  -isoproterenol  -medicine for blood pressure like amlodipine, diltiazem, verapamil  -NSAIDs, medicines for pain and inflammation, like ibuprofen or naproxen  -reserpine  This list may not describe all possible interactions. Give your health care provider a list of all the medicines, herbs, non-prescription drugs, or dietary supplements you use. Also tell them if you smoke, drink alcohol, or use illegal drugs. Some items may interact with your medicine.  What should I watch for while using this medicine?  Visit your doctor or health care professional for regular check ups. Check your blood pressure and pulse rate regularly. Ask your health care professional what your blood pressure and pulse rate should be, and when you should contact him or her.  You may get drowsy or dizzy. Do not drive, use machinery, or do anything that needs mental alertness until you know how this medicine affects you. Do not stand or sit up quickly. Alcohol may interfere with the effect of this medicine. Avoid alcoholic drinks.  This medicine can affect blood sugar levels. If you have diabetes, check with your doctor or health care professional before you  change your diet or the dose of your diabetic medicine.  Do not treat yourself for coughs, colds, or pain while you are taking this medicine without asking your doctor or health care professional for advice. Some ingredients may increase your blood pressure.  What side effects may I notice from receiving this medicine?  Side effects that you should report to your doctor or health care professional as soon as possible:  -allergic reactions like skin rash, itching or hives, swelling of the face, lips, or tongue  -breathing problems  -changes in vision  -chest pain  -cold, tingling, or numb hands or feet  -depression  -fast, irregular heartbeat  -feeling faint or lightheaded, falls  -fever with sore throat  -rapid weight gain  -swollen ankles, legs  Side effects that usually do not require medical attention (report to your doctor or health care professional if they continue or are bothersome):  -anxiety, nervous  -diarrhea  -dry skin  -change in sex drive or performance  -headache  -nightmares or trouble sleeping  -short term memory loss  -stomach upset  -unusually tired  This list may not describe all possible side effects. Call your doctor for medical advice about side effects. You may report side effects to FDA at 7-376-FDA-8122.  Where should I keep my medicine?  Keep out of the reach of children.  Store at room temperature between 20 and 25 degrees C (68 and 77 degrees F). Close tightly and protect from light. Throw away any unused medicine after the expiration date.  NOTE: This sheet is a summary. It may not cover all possible information. If you have questions about this medicine, talk to your doctor, pharmacist, or health care provider.  © 2014, [employer]. (3/11/2009 10:00:11 AM)

## 2017-12-22 NOTE — PROGRESS NOTES
Spoke to Dr. Nowak about critical level of Digoxin, he ordered to discontinue Digoxin and repeat digoxin level tomorrow AM.

## 2017-12-26 ENCOUNTER — PATIENT OUTREACH (OUTPATIENT)
Dept: HEALTH INFORMATION MANAGEMENT | Facility: OTHER | Age: 77
End: 2017-12-26

## 2017-12-26 ENCOUNTER — TELEPHONE (OUTPATIENT)
Dept: MEDICAL GROUP | Facility: PHYSICIAN GROUP | Age: 77
End: 2017-12-26

## 2017-12-26 NOTE — TELEPHONE ENCOUNTER
Pt called to let dr fischer know that thyroid levels were high and that  In hospital changed the dosage to of levothyroxine

## 2017-12-27 ENCOUNTER — ANTICOAGULATION MONITORING (OUTPATIENT)
Dept: VASCULAR LAB | Facility: MEDICAL CENTER | Age: 77
End: 2017-12-27

## 2017-12-27 ENCOUNTER — OFFICE VISIT (OUTPATIENT)
Dept: CARDIOLOGY | Facility: MEDICAL CENTER | Age: 77
End: 2017-12-27
Payer: MEDICARE

## 2017-12-27 VITALS
SYSTOLIC BLOOD PRESSURE: 100 MMHG | HEART RATE: 80 BPM | HEIGHT: 64 IN | BODY MASS INDEX: 30.73 KG/M2 | OXYGEN SATURATION: 97 % | WEIGHT: 180 LBS | DIASTOLIC BLOOD PRESSURE: 70 MMHG

## 2017-12-27 DIAGNOSIS — Z79.01 CHRONIC ANTICOAGULATION: ICD-10-CM

## 2017-12-27 DIAGNOSIS — I48.91 ATRIAL FIBRILLATION, UNSPECIFIED TYPE (HCC): ICD-10-CM

## 2017-12-27 LAB
EKG IMPRESSION: NORMAL
INR PPP: 2.4 (ref 2–3.5)

## 2017-12-27 PROCEDURE — 99214 OFFICE O/P EST MOD 30 MIN: CPT | Performed by: NURSE PRACTITIONER

## 2017-12-27 PROCEDURE — 93000 ELECTROCARDIOGRAM COMPLETE: CPT | Performed by: INTERNAL MEDICINE

## 2017-12-27 ASSESSMENT — ENCOUNTER SYMPTOMS
WEAKNESS: 0
COUGH: 0
SENSORY CHANGE: 0
FEVER: 0
HEADACHES: 0
BLOOD IN STOOL: 0
MYALGIAS: 0
SPUTUM PRODUCTION: 0
NERVOUS/ANXIOUS: 0
WHEEZING: 0
DIAPHORESIS: 0
FALLS: 0
DEPRESSION: 0
VOMITING: 0
FOCAL WEAKNESS: 0
HEMOPTYSIS: 0
DIARRHEA: 0
PND: 0
TREMORS: 0
NAUSEA: 0
TINGLING: 0
PALPITATIONS: 0
CHILLS: 0
INSOMNIA: 0
CONSTIPATION: 0
WEIGHT LOSS: 0
HEARTBURN: 0
ABDOMINAL PAIN: 0
SINUS PAIN: 0
DIZZINESS: 0
SPEECH CHANGE: 0
ORTHOPNEA: 0
SHORTNESS OF BREATH: 0

## 2017-12-27 NOTE — PROGRESS NOTES
"Subjective:     Donte Magaña is a 77 y.o. female patient of Dr. Matthews who presents today for hospital follow-up after being admitted for difficulty swallowing after recently having an ablation procedure. During her hospitalization she was in and out of atrial fibrillation with a ventricular rate response up to 180 bpm but was asymptomatic. When in sinus rhythm her rates were in the 70s.  We adjusted her medications several times but she was ultimately discharged on atenolol, tikosyn, and digoxin. Tikosyn initiation was uncomplicated, with a QTC stable at 420 ms on discharge. Her QTC today in clinic is approximately 440 ms. The EKG machine did interpret the tracing as an accelerated junctional escape rhythm, however, there are distinct P waves noted in multiple leads.    Since discharge she has felt good. Her  describes her sleeping a lot, but that has been a chronic issue for her for many years. She has had no chest pain, shortness of breath, palpitations, dizziness, falls, lightheadedness, or syncope. She has not noticed a high heart rate since being home and feels as though she is tolerating her current regimen well. She is maintained on warfarin for anticoagulation, she has had no bleeding diaphyses. INR was reportedly done yesterday and was 2.4.  BP today 100/70.    Past Medical History:   Diagnosis Date   • A-fib (CMS-HCC)    • Anesthesia     \"Tachycardia for 5 days after cataract surgery\"   • Anticoagulant long-term use 1/12/2012   • Arthritis     Knees, hips   • Asthma     with pneumonia, nothing for 3 years   • Atrial fibrillation (CMS-HCC)    • Backpain    • Breath shortness     with exertion O2  2l/m PRN, hasnt used for 3 years   • Bronchitis Nov, 2013   • CAD (coronary artery disease)    • Depression    • Glaucoma 5/3/2011   • Hematoma complicating a procedure 11/3/2012   • High cholesterol    • Hypertension    • Hypothyroid    • Lupus    • Macular degeneration    • Menopause 1/12/2012   • " Mitral regurgitation 10/30/2012   • Obesity 1/12/2012   • Pneumonia feb,2013   • Pulmonary hypertension 10/30/2012   • PVC's (premature ventricular contractions) 1/12/2012   • Senile nuclear sclerosis    • Spinal stenosis of lumbar region at multiple levels    • Unspecified cataract     repaired bilateral   • Unspecified urinary incontinence      Past Surgical History:   Procedure Laterality Date   • KNEE ARTHROPLASTY TOTAL Right 6/23/2016    Procedure: KNEE ARTHROPLASTY TOTAL;  Surgeon: Heriberto Lozada M.D.;  Location: SURGERY [facility];  Service:    • KNEE ARTHROPLASTY TOTAL Left 5/28/2015    Procedure: KNEE ARTHROPLASTY TOTAL;  Surgeon: Heriberto Lozada M.D.;  Location: SURGERY [facility];  Service:    • CATARACT PHACO WITH IOL Right 5/5/2015    Procedure: IOL OD - STANDARD;  Surgeon: Dmitry Bejarano M.D.;  Location: [facility];  Service:    • CATARACT PHACO WITH IOL  4/21/2015    Performed by Dmitry Bejarano M.D. at [facility]   • RECOVERY  11/30/2010    Performed by SURGERY, [facility]-RECOVERY at [facility] SAME DAY [facility]   • COLONOSCOPY  2008    Normal    [facility]   • ABDOMINAL HYSTERECTOMY TOTAL  April 15,1975    still has ovaries   • OPEN REDUCTION      left ankle   • OTHER      Removed pins from left ankle   • TONSILLECTOMY AND ADENOIDECTOMY       Family History   Problem Relation Age of Onset   • Stroke Mother    • Diabetes Father    • Stroke Sister    • Heart Disease Brother    • Stroke Sister    • GI Daughter      Crohn's Disease   • Heart Disease Daughter      CHF   • Other Daughter      Chronic Pain--Lymphedema   • Cancer Paternal Aunt      History   Smoking Status   • Never Smoker   Smokeless Tobacco   • Never Used     Allergies   Allergen Reactions   • Amiodarone Hives     Throat and tongue itching   • Bactrim Shortness of Breath   • Claritin-D [Loratadine-Pseudoephedrine] Palpitations   • Clotrimazole      Stinging / burning on chest   • Metoprolol       "Causes throat swelling   • Morphine      Hallucinations   • Qvar [Beclomethasone Dipropionate]      Pressure on heart     • Vibramycin Shortness of Breath   • Atorvastatin Calcium-Polysorbate 80      Muscle aches     • Augmentin      Unknown reaction   • Cipro Xr Swelling   • Diltiazem      rash   • Flecainide      dizziness   • Keflex Unspecified     Pt states \"Unsure\".   • Mucinex      GI Distress     • Tramadol      crying   • Tape Rash     Paper tape okay     Outpatient Encounter Prescriptions as of 12/27/2017   Medication Sig Dispense Refill   • magnesium oxide (MAG-OX) 400 (241.3 Mg) MG Tab tablet Take 1 Tab by mouth every day. 30 Tab 6   • atenolol (TENORMIN) 25 MG Tab Take 1 Tab by mouth every day. 30 Tab 6   • levothyroxine (SYNTHROID) 50 MCG Tab Take 1 Tab by mouth Every morning on an empty stomach. 30 Tab 0   • dofetilide (TIKOSYN) 500 MCG Cap Take 1 Cap by mouth every 12 hours. 60 Cap 3   • furosemide (LASIX) 40 MG Tab Take 40-80 mg by mouth 2 Times a Day. 80 mg in the Am and 40 mg at Noon     • warfarin (COUMADIN) 2.5 MG Tab Take 2.5-3.75 mg by mouth every day. 3.75 mg on Monday and Wednesday  2.5 mg all other days     • sertraline (ZOLOFT) 25 MG tablet Take 1 Tab by mouth every day. 90 Tab 3   • digoxin (LANOXIN) 125 MCG Tab Take 1 Tab by mouth every day. 90 Tab 3   • Multiple Vitamins-Minerals (ICAPS AREDS 2) Cap Take 2 Caps by mouth every day. 100 Cap 3   • potassium chloride SA (KDUR) 20 MEQ Tab CR Take 20 mEq by mouth 3 times a day. 180 Tab 3   • estradiol (ESTRACE) 2 MG Tab TAKE ONE TABLET BY MOUTH EVERY DAY 90 Tab 1   • ranitidine (ZANTAC) 150 MG Tab Take 1 Tab by mouth every day. 90 Tab 3   • lisinopril (PRINIVIL) 5 MG Tab Take 1 Tab by mouth every day. 90 Tab 2   • magnesium oxide (MAG-OX) 400 MG Tab TAKE ONE TABLET BY MOUTH EVERY DAY 90 Tab 3   • docusate sodium (COLACE) 100 MG Cap Take 200 mg by mouth every evening.     • Diclofenac Sodium 1 % Gel Apply  to affected area(s) 2 times a day as " "needed (For joint pain).     • acetaminophen (TYLENOL) 500 MG TABS Take 1,000 mg by mouth 3 times a day. Indications: Pain     • sennosides-docusate sodium (SENOKOT-S) 8.6-50 MG tablet Take 3 Tabs by mouth every day. 30 Tab    • vitamin D (CHOLECALCIFEROL) 1000 UNIT TABS Take 2,000 Units by mouth every day.       No facility-administered encounter medications on file as of 12/27/2017.      Review of Systems   Constitutional: Positive for malaise/fatigue. Negative for chills, diaphoresis, fever and weight loss.        Sleeps a lot, not new for her   HENT: Negative for congestion, ear discharge, ear pain, nosebleeds and sinus pain.    Respiratory: Negative for cough, hemoptysis, sputum production, shortness of breath and wheezing.         Cough resolved after admission     Cardiovascular: Positive for leg swelling. Negative for chest pain, palpitations, orthopnea and PND.        Reports improvement in LE edema   Gastrointestinal: Negative for abdominal pain, blood in stool, constipation, diarrhea, heartburn, melena, nausea and vomiting.   Genitourinary: Negative for dysuria, frequency, hematuria and urgency.   Musculoskeletal: Negative for falls and myalgias.   Skin: Negative for itching and rash.   Neurological: Negative for dizziness, tingling, tremors, sensory change, speech change, focal weakness, weakness and headaches.   Psychiatric/Behavioral: Negative for depression. The patient is not nervous/anxious and does not have insomnia.       Objective:   Pulse 80   Ht 1.626 m (5' 4.02\")   Wt 81.6 kg (180 lb)   LMP 01/01/1993   SpO2 97%   BMI 30.88 kg/m²     Physical Exam   Constitutional: She is oriented to person, place, and time. She appears well-developed and well-nourished. No distress.   HENT:   Head: Normocephalic and atraumatic.   Right Ear: External ear normal.   Left Ear: External ear normal.   Mouth/Throat: Oropharynx is clear and moist.   Eyes: Pupils are equal, round, and reactive to light. Right eye " exhibits no discharge. Left eye exhibits no discharge. No scleral icterus.   Neck: Normal range of motion. Neck supple. No JVD present.   Cardiovascular: Normal rate, regular rhythm and intact distal pulses.  Exam reveals no gallop and no friction rub.    Murmur heard.  Pulmonary/Chest: Effort normal and breath sounds normal. No stridor. No respiratory distress. She has no wheezes. She has no rales.   Abdominal: Soft. Bowel sounds are normal. She exhibits no distension. There is no tenderness. There is no rebound and no guarding.   Musculoskeletal: Normal range of motion. She exhibits no edema.   Neurological: She is alert and oriented to person, place, and time.   Skin: Skin is warm and dry. No rash noted. She is not diaphoretic. No erythema. No pallor.   Psychiatric: She has a normal mood and affect. Her behavior is normal. Judgment and thought content normal.   Nursing note and vitals reviewed.    Assessment:     1. Atrial fibrillation, unspecified type (CMS-[facility])  EKG     Medical Decision Making:  Today's Assessment / Status / Plan:     Continue current medication regimen as listed above.     She will return to clinic in 3 months, at which time we'll check a digoxin level. Was supratherapeutic at discharge although unlikely given normal renal function. She has had no adverse reaction to atenolol.      Collaborating MD:  Dr. Devon Clement

## 2017-12-27 NOTE — PROGRESS NOTES
OP Anticoagulation Telephone Note    Date: 12/27/2017  Anticoagulation Summary  As of 12/27/2017    INR goal:   2.0-3.0   TTR:   68.8 % (2.5 y)   Today's INR:   2.4 (12/26/2017)   Maintenance plan:   3.75 mg (2.5 mg x 1.5) on Mon, Thu; 2.5 mg (2.5 mg x 1) all other days   Weekly total:   20 mg   No change documented:   Roxana Mercedes, Med Ass't   Plan last modified:   [facility] Cheng, PharmD (11/21/2017)   Next INR check:   1/9/2018   Target end date:   Indefinite    Indications    Atrial fibrillation (CMS-HCC) (Resolved) [I48.91]  Chronic anticoagulation [Z79.01]             Anticoagulation Episode Summary     INR check location:   Home Draw    Preferred lab:       Send INR reminders to:       Comments:   Winston       Anticoagulation Care Providers     Provider Role Specialty Phone number    Lizzy Haywood M.D. Referring Cardiology [phone]    [facility] Anticoagulation Services Responsible  [phone]    Vladimir Taylor, PharmD Responsible          Anticoagulation Patient Findings  Patient Findings     Negatives:   Signs/symptoms of thrombosis, Signs/symptoms of bleeding, Laboratory test error suspected, Change in health, Change in alcohol use, Change in activity, Upcoming invasive procedure, Emergency department visit, Upcoming dental procedure, Missed doses, Extra doses, Change in medications, Change in diet/appetite, Hospital admission, Bruising, Other complaints      Plan:  Spoke with patient's spouse on the phone. Patient is therapeutic today. Patient denies any changes in medications or diet. Patient denies any signs or symptoms of bleeding or clotting. Instructed patient to call clinic if any unusual bleeding or bruising occurs. Will continue dosing as outlined above. Will follow-up with patient in 2 weeks.    Roxana Mercedes, Medical Assistant    1/9/2018    Concur with plan outlined above    [facility] Cheng, Tim

## 2017-12-27 NOTE — LETTER
[facility]   [address], [address]  [place] [address]  Phone: [phone]  Fax: [phone]              Donte Alicea Gómez  1940    Encounter Date: 12/27/2017    VICTORIANO Bradley          PROGRESS NOTE:  No notes on file      Kishore Price M.D.  [address] [address]  VIA In Basket

## 2018-01-03 ENCOUNTER — TELEPHONE (OUTPATIENT)
Dept: CARDIOLOGY | Facility: MEDICAL CENTER | Age: 78
End: 2018-01-03

## 2018-01-03 DIAGNOSIS — I48.91 ATRIAL FIBRILLATION WITH RAPID VENTRICULAR RESPONSE (HCC): ICD-10-CM

## 2018-01-03 RX ORDER — ATENOLOL 25 MG/1
25 TABLET ORAL 2 TIMES DAILY
Qty: 90 TAB | Refills: 3 | OUTPATIENT
Start: 2018-01-03 | End: 2018-06-25 | Stop reason: SDUPTHER

## 2018-01-03 NOTE — TELEPHONE ENCOUNTER
----- Message from Lizzy Haywood M.D. sent at 1/3/2018  2:37 PM PST -----  Can you do me a big favor & call Donte?  She left me a VM on my cell - something about the atenolol...    Tx so much - I will wait to hear

## 2018-01-03 NOTE — TELEPHONE ENCOUNTER
Lizzy Haywood M.D.   to Donte Alicea Gómez 2:28 PM     Hi Mr. Magaña!     Sure thing, go to atenolol 25 mg twice a day, don't ever hesitate to increase to 3 times a day, it certainly won't hurt!     And keep me up to date, happy happy new year to all of you!     Lizzy A     =========================================================    Called pt, pt reports she received Dr Haywood email but she was calling her to ask for a refill of her medications (Atenolol) if she is going to take it more frequently as recommended by Dr Haywood.    [facility]ill Rx sent to [facility]

## 2018-01-08 ENCOUNTER — TELEPHONE (OUTPATIENT)
Dept: CARDIOLOGY | Facility: MEDICAL CENTER | Age: 78
End: 2018-01-08

## 2018-01-08 NOTE — TELEPHONE ENCOUNTER
Pharmacy ([id number]) is requesting refill of Omeprazole 20mg.  To Leandra; does she still need this?

## 2018-01-08 NOTE — TELEPHONE ENCOUNTER
Called pt. To advise.      Message   Received: Today   Message Contents   VICTORIANO Bradley L.P.N.   Caller: Unspecified (Today, 12:28 PM)             No, she doesn't need to take that any longer.  That was just the 1 month supply we gave her post-ablation.

## 2018-01-09 ENCOUNTER — ANTICOAGULATION MONITORING (OUTPATIENT)
Dept: VASCULAR LAB | Facility: MEDICAL CENTER | Age: 78
End: 2018-01-09

## 2018-01-09 DIAGNOSIS — Z79.01 CHRONIC ANTICOAGULATION: ICD-10-CM

## 2018-01-09 LAB — INR PPP: 2.8 (ref 2–3.5)

## 2018-01-09 RX ORDER — LEVOTHYROXINE SODIUM 0.05 MG/1
50 TABLET ORAL
Qty: 30 TAB | Refills: 3 | Status: SHIPPED | OUTPATIENT
Start: 2018-01-09 | End: 2018-05-05 | Stop reason: SDUPTHER

## 2018-01-09 NOTE — PROGRESS NOTES
Anticoagulation Summary  As of 1/9/2018    INR goal:   2.0-3.0   TTR:   69.3 % (2.6 y)   Today's INR:   2.8   Maintenance plan:   3.75 mg (2.5 mg x 1.5) on Mon, Thu; 2.5 mg (2.5 mg x 1) all other days   Weekly total:   20 mg   No change documented:   Wayne Cheng PharmD   Plan last modified:   [facility] Cheng PharmD (11/21/2017)   Next INR check:   1/23/2018   Target end date:   Indefinite    Indications    Atrial fibrillation (CMS-HCC) (Resolved) [I48.91]  Chronic anticoagulation [Z79.01]             Anticoagulation Episode Summary     INR check location:   Home Draw    Preferred lab:       Send INR reminders to:       Comments:   Winston YEAGER      Anticoagulation Care Providers     Provider Role Specialty Phone number    Lizzy Haywood M.D. Referring Cardiology [phone]    [facility] Anticoagulation Services Responsible  [phone]    Vladimir Taylor, PharmD Responsible          Anticoagulation Patient Findings    Spoke to patient on phone. No current signs of bleeding or thrombosis. No interval medication changes. Continue current dose of warfarin. Follow up INR in 2 weeks.    [facility] Cheng, PeterD

## 2018-01-23 LAB — INR PPP: 2.6 (ref 2–3.5)

## 2018-01-24 ENCOUNTER — ANTICOAGULATION MONITORING (OUTPATIENT)
Dept: VASCULAR LAB | Facility: MEDICAL CENTER | Age: 78
End: 2018-01-24

## 2018-01-24 DIAGNOSIS — Z79.01 CHRONIC ANTICOAGULATION: ICD-10-CM

## 2018-01-25 NOTE — PROGRESS NOTES
Anticoagulation Summary  As of 1/24/2018    INR goal:   2.0-3.0   TTR:   69.7 % (2.6 y)   Today's INR:   2.6 (1/23/2018)   Maintenance plan:   3.75 mg (2.5 mg x 1.5) on Mon, Thu; 2.5 mg (2.5 mg x 1) all other days   Weekly total:   20 mg   Plan last modified:   Peter DaileyD (11/21/2017)   Next INR check:   2/6/2018   Target end date:   Indefinite    Indications    Atrial fibrillation (CMS-HCC) (Resolved) [I48.91]  Chronic anticoagulation [Z79.01]             Anticoagulation Episode Summary     INR check location:   Home Draw    Preferred lab:       Send INR reminders to:       Comments:   Winston YEAGER      Anticoagulation Care Providers     Provider Role Specialty Phone number    Lizzy Haywood M.D. Referring Cardiology [phone]    [facility] Anticoagulation Services Responsible  [phone]    Peter HollowayD Responsible          Anticoagulation Patient Findings    See note by Tete Vegas  02/23/2016      Peter BarriosD

## 2018-02-02 ENCOUNTER — PATIENT OUTREACH (OUTPATIENT)
Dept: HEALTH INFORMATION MANAGEMENT | Facility: OTHER | Age: 78
End: 2018-02-02

## 2018-02-02 NOTE — PROGRESS NOTES
Donte Magaña was discharged on 12/22/2017. [facility] patient advocate assisted with discharge needs including 1 appointment, 1 cardiology appointment. Of the 1 appointment the patient kept 1. Patient is also scheduled for 2 future cardiology appointments on 3/28/18 and 4/30/18. Patient advocate also assisted the patient by reaching out to the patient's cardiologist MA for medical advice. PPS Screening was conducted.

## 2018-02-05 ENCOUNTER — HOSPITAL ENCOUNTER (OUTPATIENT)
Dept: RADIOLOGY | Facility: MEDICAL CENTER | Age: 78
End: 2018-02-05
Attending: FAMILY MEDICINE
Payer: MEDICARE

## 2018-02-05 DIAGNOSIS — Z12.39 BREAST SCREENING: ICD-10-CM

## 2018-02-05 PROCEDURE — 77067 SCR MAMMO BI INCL CAD: CPT

## 2018-02-06 ENCOUNTER — ANTICOAGULATION MONITORING (OUTPATIENT)
Dept: VASCULAR LAB | Facility: MEDICAL CENTER | Age: 78
End: 2018-02-06

## 2018-02-06 DIAGNOSIS — Z79.01 CHRONIC ANTICOAGULATION: ICD-10-CM

## 2018-02-06 LAB — INR PPP: 2.6 (ref 2–3.5)

## 2018-02-06 RX ORDER — ESTRADIOL 2 MG/1
TABLET ORAL
Qty: 90 TAB | Refills: 3 | Status: SHIPPED | OUTPATIENT
Start: 2018-02-06 | End: 2019-02-02 | Stop reason: SDUPTHER

## 2018-02-06 NOTE — PROGRESS NOTES
OP Anticoagulation Telephone Note    Date: 2/6/2018  Anticoagulation Summary  As of 2/6/2018    INR goal:   2.0-3.0   TTR:   70.2 % (2.6 y)   Today's INR:   2.6   Maintenance plan:   3.75 mg (2.5 mg x 1.5) on Mon, Thu; 2.5 mg (2.5 mg x 1) all other days   Weekly total:   20 mg   No change documented:   Linus Cooley Ass't   Plan last modified:   Way[facility] Cheng, PharmD (11/21/2017)   Next INR check:   2/20/2018   Target end date:   Indefinite    Indications    Atrial fibrillation (CMS-HCC) (Resolved) [I48.91]  Chronic anticoagulation [Z79.01]             Anticoagulation Episode Summary     INR check location:   Home Draw    Preferred lab:       Send INR reminders to:       Comments:   Winston YEAGER      Anticoagulation Care Providers     Provider Role Specialty Phone number    Lizzy Haywood M.D. Referring Cardiology [phone]    [facility] Anticoagulation Services Responsible  [phone]    Vladimir Taylor, PharmD Responsible          Anticoagulation Patient Findings  Patient Findings     Negatives:   Signs/symptoms of thrombosis, Signs/symptoms of bleeding, Laboratory test error suspected, Change in health, Change in alcohol use, Change in activity, Upcoming invasive procedure, Emergency department visit, Upcoming dental procedure, Missed doses, Extra doses, Change in medications, Change in diet/appetite, Hospital admission, Bruising, Other complaints      Plan:  Spoke with patient on the phone. Patient is therapeutic today. Patient denies any changes in medications or diet. Patient denies any signs or symptoms of bleeding or clotting. Instructed patient to call clinic if any unusual bleeding or bruising occurs. Will continue dosing as outlined above. Will follow-up with patient in 2 weeks.    Roxana Mercedes, Medical Assistant    I have reviewed and agree with the plan above on 02/08/2018      Massiel Coleman, PharmD

## 2018-02-20 ENCOUNTER — ANTICOAGULATION MONITORING (OUTPATIENT)
Dept: VASCULAR LAB | Facility: MEDICAL CENTER | Age: 78
End: 2018-02-20

## 2018-02-20 DIAGNOSIS — Z79.01 CHRONIC ANTICOAGULATION: ICD-10-CM

## 2018-02-20 LAB — INR PPP: 3.2 (ref 2–3.5)

## 2018-02-20 NOTE — PROGRESS NOTES
Anticoagulation Summary  As of 2/20/2018    INR goal:   2.0-3.0   TTR:   70.1 % (2.7 y)   Today's INR:   3.2!   Maintenance plan:   3.75 mg (2.5 mg x 1.5) on Mon, Thu; 2.5 mg (2.5 mg x 1) all other days   Weekly total:   20 mg   Plan last modified:   Wayne Cheng, PharmD (11/21/2017)   Next INR check:   3/6/2018   Target end date:   Indefinite    Indications    Atrial fibrillation (CMS-HCC) (Resolved) [I48.91]  Chronic anticoagulation [Z79.01]             Anticoagulation Episode Summary     INR check location:   Home Draw    Preferred lab:       Send INR reminders to:       Comments:   Winston YEAGER      Anticoagulation Care Providers     Provider Role Specialty Phone number    Lizzy Haywood M.D. Referring Cardiology [phone]    [facility] Responsible  [phone]    Vladimir Taylor, PharmD Responsible          Anticoagulation Patient Findings   HPI:  Donte Magaña, on anticoagulation therapy with warfarin for AF.  Changes to current medical/health status since last appt: none.  Denies signs/symptoms of bleeding and/or thrombosis since the last appt.    Denies any interval changes to diet  Denies any interval changes to medications since last appt.   Denies any complications or cost restrictions with current therapy.     A/P   INR  SUPRA-therapeutic.   Reduce today then Pt is to continue with current warfarin dosing regimen.     Next INR in 2 week(s).    Juan Blanco, PharmD

## 2018-03-05 ENCOUNTER — TELEPHONE (OUTPATIENT)
Dept: CARDIOLOGY | Facility: MEDICAL CENTER | Age: 78
End: 2018-03-05

## 2018-03-05 ENCOUNTER — PATIENT MESSAGE (OUTPATIENT)
Dept: MEDICAL GROUP | Facility: PHYSICIAN GROUP | Age: 78
End: 2018-03-05

## 2018-03-05 DIAGNOSIS — R19.7 DIARRHEA DUE TO MALABSORPTION: ICD-10-CM

## 2018-03-05 DIAGNOSIS — I48.0 PAROXYSMAL ATRIAL FIBRILLATION (HCC): ICD-10-CM

## 2018-03-05 DIAGNOSIS — Z79.899 HIGH RISK MEDICATION USE: ICD-10-CM

## 2018-03-05 DIAGNOSIS — K90.9 DIARRHEA DUE TO MALABSORPTION: ICD-10-CM

## 2018-03-05 DIAGNOSIS — R53.83 FATIGUE, UNSPECIFIED TYPE: ICD-10-CM

## 2018-03-05 NOTE — TELEPHONE ENCOUNTER
Leandra,   I have been having diarrhea for the last couple of weeks.  I looked at the side effects for my medications &   the only one that I found for diarrhea was Dofetilide.  Each one is 500 mg.  I take this twice a day.  I'm not   sure if this has anything to do with it, but I also can't control my bladder.  I am suppose to to 2 (40mg) of   Lasix in the morning & I (40mg) at noon.  Dr. Price said to take  1 in the morning.  This did not help.   Page 2   Could the Dofetilide cause the diarrhea? Is there another pill that will work?  I spend hours per day in the bathroom. I need help with this problem.     Thanks   Donte Magaña   [phone]

## 2018-03-06 ENCOUNTER — TELEPHONE (OUTPATIENT)
Dept: URGENT CARE | Facility: PHYSICIAN GROUP | Age: 78
End: 2018-03-06

## 2018-03-06 ENCOUNTER — HOSPITAL ENCOUNTER (OUTPATIENT)
Facility: MEDICAL CENTER | Age: 78
End: 2018-03-06
Attending: PHYSICIAN ASSISTANT
Payer: MEDICARE

## 2018-03-06 ENCOUNTER — HOSPITAL ENCOUNTER (OUTPATIENT)
Dept: LAB | Facility: MEDICAL CENTER | Age: 78
End: 2018-03-06
Attending: PHYSICIAN ASSISTANT
Payer: MEDICARE

## 2018-03-06 ENCOUNTER — ANTICOAGULATION MONITORING (OUTPATIENT)
Dept: VASCULAR LAB | Facility: MEDICAL CENTER | Age: 78
End: 2018-03-06

## 2018-03-06 ENCOUNTER — OFFICE VISIT (OUTPATIENT)
Dept: URGENT CARE | Facility: PHYSICIAN GROUP | Age: 78
End: 2018-03-06
Payer: MEDICARE

## 2018-03-06 VITALS
WEIGHT: 172 LBS | BODY MASS INDEX: 29.51 KG/M2 | OXYGEN SATURATION: 95 % | RESPIRATION RATE: 16 BRPM | SYSTOLIC BLOOD PRESSURE: 118 MMHG | DIASTOLIC BLOOD PRESSURE: 58 MMHG | HEART RATE: 70 BPM | TEMPERATURE: 97.9 F

## 2018-03-06 DIAGNOSIS — R39.15 URINARY URGENCY: ICD-10-CM

## 2018-03-06 DIAGNOSIS — Z79.01 CHRONIC ANTICOAGULATION: ICD-10-CM

## 2018-03-06 DIAGNOSIS — R19.7 DIARRHEA, UNSPECIFIED TYPE: ICD-10-CM

## 2018-03-06 DIAGNOSIS — R31.9 HEMATURIA, UNSPECIFIED TYPE: ICD-10-CM

## 2018-03-06 LAB
ALBUMIN SERPL BCP-MCNC: 3.9 G/DL (ref 3.2–4.9)
ALBUMIN/GLOB SERPL: 1.2 G/DL
ALP SERPL-CCNC: 100 U/L (ref 30–99)
ALT SERPL-CCNC: 13 U/L (ref 2–50)
ANION GAP SERPL CALC-SCNC: 13 MMOL/L (ref 0–11.9)
APPEARANCE UR: NORMAL
AST SERPL-CCNC: 13 U/L (ref 12–45)
BASOPHILS # BLD AUTO: 0.6 % (ref 0–1.8)
BASOPHILS # BLD: 0.07 K/UL (ref 0–0.12)
BILIRUB SERPL-MCNC: 0.3 MG/DL (ref 0.1–1.5)
BILIRUB UR STRIP-MCNC: NORMAL MG/DL
BUN SERPL-MCNC: 18 MG/DL (ref 8–22)
CALCIUM SERPL-MCNC: 9.7 MG/DL (ref 8.5–10.5)
CHLORIDE SERPL-SCNC: 99 MMOL/L (ref 96–112)
CO2 SERPL-SCNC: 25 MMOL/L (ref 20–33)
COLOR UR AUTO: YELLOW
CREAT SERPL-MCNC: 0.83 MG/DL (ref 0.5–1.4)
DIGOXIN SERPL-MCNC: 0.8 NG/ML (ref 0.8–2)
EOSINOPHIL # BLD AUTO: 0.22 K/UL (ref 0–0.51)
EOSINOPHIL NFR BLD: 2 % (ref 0–6.9)
ERYTHROCYTE [DISTWIDTH] IN BLOOD BY AUTOMATED COUNT: 47.8 FL (ref 35.9–50)
GLOBULIN SER CALC-MCNC: 3.2 G/DL (ref 1.9–3.5)
GLUCOSE SERPL-MCNC: 181 MG/DL (ref 65–99)
GLUCOSE UR STRIP.AUTO-MCNC: NORMAL MG/DL
HCT VFR BLD AUTO: 44.8 % (ref 37–47)
HGB BLD-MCNC: 14.2 G/DL (ref 12–16)
IMM GRANULOCYTES # BLD AUTO: 0.07 K/UL (ref 0–0.11)
IMM GRANULOCYTES NFR BLD AUTO: 0.6 % (ref 0–0.9)
INR PPP: 2.7 (ref 2–3.5)
KETONES UR STRIP.AUTO-MCNC: NORMAL MG/DL
LEUKOCYTE ESTERASE UR QL STRIP.AUTO: NORMAL
LYMPHOCYTES # BLD AUTO: 2.87 K/UL (ref 1–4.8)
LYMPHOCYTES NFR BLD: 25.9 % (ref 22–41)
MAGNESIUM SERPL-MCNC: 2 MG/DL (ref 1.5–2.5)
MCH RBC QN AUTO: 30.6 PG (ref 27–33)
MCHC RBC AUTO-ENTMCNC: 31.7 G/DL (ref 33.6–35)
MCV RBC AUTO: 96.6 FL (ref 81.4–97.8)
MONOCYTES # BLD AUTO: 0.79 K/UL (ref 0–0.85)
MONOCYTES NFR BLD AUTO: 7.1 % (ref 0–13.4)
NEUTROPHILS # BLD AUTO: 7.05 K/UL (ref 2–7.15)
NEUTROPHILS NFR BLD: 63.8 % (ref 44–72)
NITRITE UR QL STRIP.AUTO: NORMAL
NRBC # BLD AUTO: 0 K/UL
NRBC BLD-RTO: 0 /100 WBC
PH UR STRIP.AUTO: 5 [PH] (ref 5–8)
PLATELET # BLD AUTO: 369 K/UL (ref 164–446)
PMV BLD AUTO: 10.6 FL (ref 9–12.9)
POTASSIUM SERPL-SCNC: 4.1 MMOL/L (ref 3.6–5.5)
PROT SERPL-MCNC: 7.1 G/DL (ref 6–8.2)
PROT UR QL STRIP: NORMAL MG/DL
RBC # BLD AUTO: 4.64 M/UL (ref 4.2–5.4)
RBC UR QL AUTO: NORMAL
SODIUM SERPL-SCNC: 137 MMOL/L (ref 135–145)
SP GR UR STRIP.AUTO: 1.02
UROBILINOGEN UR STRIP-MCNC: NORMAL MG/DL
WBC # BLD AUTO: 11.1 K/UL (ref 4.8–10.8)

## 2018-03-06 PROCEDURE — 99000 SPECIMEN HANDLING OFFICE-LAB: CPT | Performed by: PHYSICIAN ASSISTANT

## 2018-03-06 PROCEDURE — 80053 COMPREHEN METABOLIC PANEL: CPT

## 2018-03-06 PROCEDURE — 83735 ASSAY OF MAGNESIUM: CPT

## 2018-03-06 PROCEDURE — 36415 COLL VENOUS BLD VENIPUNCTURE: CPT

## 2018-03-06 PROCEDURE — 87086 URINE CULTURE/COLONY COUNT: CPT

## 2018-03-06 PROCEDURE — 81002 URINALYSIS NONAUTO W/O SCOPE: CPT | Performed by: PHYSICIAN ASSISTANT

## 2018-03-06 PROCEDURE — 85025 COMPLETE CBC W/AUTO DIFF WBC: CPT

## 2018-03-06 PROCEDURE — 99214 OFFICE O/P EST MOD 30 MIN: CPT | Performed by: PHYSICIAN ASSISTANT

## 2018-03-06 PROCEDURE — 80162 ASSAY OF DIGOXIN TOTAL: CPT

## 2018-03-06 ASSESSMENT — ENCOUNTER SYMPTOMS
VOMITING: 0
NAUSEA: 0
ABDOMINAL PAIN: 0
MUSCULOSKELETAL NEGATIVE: 1
PSYCHIATRIC NEGATIVE: 1
NEUROLOGICAL NEGATIVE: 1
EYES NEGATIVE: 1
CARDIOVASCULAR NEGATIVE: 1
FLANK PAIN: 0
DIARRHEA: 1
RESPIRATORY NEGATIVE: 1

## 2018-03-06 NOTE — TELEPHONE ENCOUNTER
Pt notified by email. Labs ordered and mailed to pt.       VICTORIANO Bradley R.N.   Caller: Unspecified (Yesterday,  3:23 PM)             Tikosyn causes diarrhea in about 3% of people, digoxin in about 4% of people.  Mag ox can also cause it.  Let's have her do a full set of labs (CBC w/ diff, CMP, Mg, dig level) & have her either see her PCP or one of us at first available.  Not sure about the urinary incontinence.  That isn't listed as an adverse effect for neither tikosyn nor digoxin.

## 2018-03-06 NOTE — PATIENT INSTRUCTIONS
Blood work pending.  Urine culture pending.  If urine culture is positive for infection consider Ceftin 500mg, 1 capsule, every 12 hours for 7 days.  (total of 14 tablets/capsules).

## 2018-03-06 NOTE — PROGRESS NOTES
"Subjective:      Donte Magaña is a 77 y.o. female who presents with Enuresis (urine incontinence and diarrhea x2-3 wks)            HPI  Chief Complaint   Patient presents with   • Enuresis     urine incontinence and diarrhea x2-3 wks       HPI:  Donte Magaña is a 77 y.o.female who presents with urinary urgency and incontinence for the last week.  Diarrhea x 2-3 weeks.  Patient has follow-up with cardiology on Thursday. No new medications the last Patient denies HA, SOB, chest pain, palpitations, fever, chills, or n/v/d.      Past Medical History:   Diagnosis Date   • A-fib (CMS-HCC)    • Anesthesia     \"Tachycardia for 5 days after cataract surgery\"   • Anticoagulant long-term use 1/12/2012   • Arthritis     Knees, hips   • Asthma     with pneumonia, nothing for 3 years   • Atrial fibrillation (CMS-HCC)    • Backpain    • Breath shortness     with exertion O2  2l/m PRN, hasnt used for 3 years   • Bronchitis Nov, 2013   • CAD (coronary artery disease)    • Depression    • Glaucoma 5/3/2011   • Hematoma complicating a procedure 11/3/2012   • High cholesterol    • Hypertension    • Hypothyroid    • Lupus    • Macular degeneration    • Menopause 1/12/2012   • Mitral regurgitation 10/30/2012   • Obesity 1/12/2012   • Pneumonia feb,2013   • Pulmonary hypertension 10/30/2012   • PVC's (premature ventricular contractions) 1/12/2012   • Senile nuclear sclerosis    • Spinal stenosis of lumbar region at multiple levels    • Unspecified cataract     repaired bilateral   • Unspecified urinary incontinence        Past Surgical History:   Procedure Laterality Date   • KNEE ARTHROPLASTY TOTAL Right 6/23/2016    Procedure: KNEE ARTHROPLASTY TOTAL;  Surgeon: Heriberto Lozada M.D.;  Location: [facility];  Service:    • KNEE ARTHROPLASTY TOTAL Left 5/28/2015    Procedure: KNEE ARTHROPLASTY TOTAL;  Surgeon: Heriberto Lozada M.D.;  Location: [facility];  Service:    • CATARACT PHACO WITH IOL Right 5/5/2015 "    Procedure: IOL OD - STANDARD;  Surgeon: Dmitry Bejarano M.D.;  Location: SURGERY [facility];  Service:    • CATARACT PHACO WITH IOL  4/21/2015    Performed by Dmitry Bejarano M.D. at SURGERY [facility] ORS   • RECOVERY  11/30/2010    Performed by SURGERY, CATH-RECOVERY at SURGERY SAME DAY [facility] ORS   • COLONOSCOPY  2008    Normal    [facility]   • ABDOMINAL HYSTERECTOMY TOTAL  April 15,1975    still has ovaries   • OPEN REDUCTION      left ankle   • OTHER      Removed pins from left ankle   • TONSILLECTOMY AND ADENOIDECTOMY         Family History   Problem Relation Age of Onset   • Stroke Mother    • Diabetes Father    • Stroke Sister    • Heart Disease Brother    • Stroke Sister    • GI Daughter      Crohn's Disease   • Heart Disease Daughter      CHF   • Other Daughter      Chronic Pain--Lymphedema   • Cancer Paternal Aunt      No pertinent family history.    Social History     Social History   • Marital status:      Spouse name: N/A   • Number of children: N/A   • Years of education: N/A     Occupational History   • Not on file.     Social History Main Topics   • Smoking status: Never Smoker   • Smokeless tobacco: Never Used   • Alcohol use No   • Drug use: No   • Sexual activity: Not Currently     Partners: Male     Birth control/ protection: Post-Menopausal     Other Topics Concern   • Not on file     Social History Narrative   • No narrative on file         Current Outpatient Prescriptions:   •  estradiol, TAKE ONE TABLET BY MOUTH EVERY DAY  •  levothyroxine, 50 mcg, Oral, AM ES  •  atenolol, 25 mg, Oral, BID  •  magnesium oxide, 400 mg, Oral, DAILY, 12/27/2017  •  dofetilide, 500 mcg, Oral, Q12HRS, 12/27/2017  •  furosemide, 40-80 mg, Oral, BID, 12/27/2017 at 1200  •  warfarin, 2.5-3.75 mg, Oral, DAILY, 12/27/2017 at 1800  •  sertraline, 25 mg, Oral, QDAY, 12/27/2017  •  digoxin, 125 mcg, Oral, QDAY, 12/27/2017 at AM  •  ICAPS AREDS 2, 2 Cap, Oral, DAILY, 12/27/2017 at AM  •   "potassium chloride SA, 20 mEq, Oral, TID, 12/27/2017 at PM  •  raNITidine, 150 mg, Oral, DAILY, 12/27/2017 at PM  •  lisinopril, 5 mg, Oral, DAILY, 12/27/2017 at K  •  magnesium oxide, TAKE ONE TABLET BY MOUTH EVERY DAY, 12/27/2017 at AM  •  docusate sodium, 200 mg, Oral, Q EVENING, 12/27/2017 at PM  •  Diclofenac Sodium, Apply  to affected area(s) 2 times a day as needed (For joint pain)., 12/27/2017 at PM  •  acetaminophen, 1,000 mg, Oral, TID, 12/27/2017 at AM  •  sennosides-docusate sodium, 3 Tab, Oral, DAILY, 12/27/2017 at AM  •  vitamin D, 2,000 Units, Oral, DAILY, 12/27/2017 at AM    Allergies   Allergen Reactions   • Amiodarone Hives     Throat and tongue itching   • Bactrim Shortness of Breath   • Claritin-D [Loratadine-Pseudoephedrine] Palpitations   • Clotrimazole      Stinging / burning on chest   • Metoprolol      Causes throat swelling   • Morphine      Hallucinations   • Qvar [Beclomethasone Dipropionate]      Pressure on heart     • Vibramycin Shortness of Breath   • Atorvastatin Calcium-Polysorbate 80      Muscle aches     • Augmentin      Unknown reaction   • Cipro Xr Swelling   • Diltiazem      rash   • Flecainide      dizziness   • Keflex Unspecified     Pt states \"Unsure\".   • Mucinex      GI Distress     • Tramadol      crying   • Tape Rash     Paper tape okay        Review of Systems   Constitutional: Positive for malaise/fatigue.   HENT: Negative.    Eyes: Negative.    Respiratory: Negative.    Cardiovascular: Negative.    Gastrointestinal: Positive for diarrhea. Negative for abdominal pain, nausea and vomiting.   Genitourinary: Positive for frequency and urgency. Negative for dysuria, flank pain and hematuria.   Musculoskeletal: Negative.    Skin: Negative.    Neurological: Negative.    Endo/Heme/Allergies: Negative.    Psychiatric/Behavioral: Negative.           Objective:     /58   Pulse 70   Temp 36.6 °C (97.9 °F)   Resp 16   Wt 78 kg (172 lb)   LMP 01/01/1993   SpO2 95%   " BMI 29.51 kg/m²      Physical Exam       Constitutional:   Appropriately groomed, pleasant affect, well nourished, in NAD.    Head:   Normocephalic, atraumatic.    Eyes:   EOM's full, sclera white, conjunctiva not erythematous, and medial canthus without exudate bilaterally.    Throat:  Dentition wnl, mucosa moist without lesions.      Lungs:  Respiratory effort not labored without accessory muscle use.      Abdominal:  Abdomen soft to palpation with mild suprapubic ttp.  No masses or hepatosplenomegaly.  No CVA tenderness bilaterally to percussion.    Musculoskeletal:  Gait nonantalgic with a narrow base.    Derm:  Skin without rashes or lesions with good turgor pressure.      Psychiatric:  Mood, affect, and judgement appropriate.       Component Value Ref Range & Units Status   WBC 11.1   4.8 - 10.8 K/uL Final   RBC 4.64  4.20 - 5.40 M/uL Final   Hemoglobin 14.2  12.0 - 16.0 g/dL Final   Hematocrit 44.8  37.0 - 47.0 % Final   MCV 96.6  81.4 - 97.8 fL Final   MCH 30.6  27.0 - 33.0 pg Final   MCHC 31.7   33.6 - 35.0 g/dL Final   RDW 47.8  35.9 - 50.0 fL Final   Platelet Count 369  164 - 446 K/uL Final   MPV 10.6  9.0 - 12.9 fL Final   Neutrophils-Polys 63.80  44.00 - 72.00 % Final   Lymphocytes 25.90  22.00 - 41.00 % Final   Monocytes 7.10  0.00 - 13.40 % Final   Eosinophils 2.00  0.00 - 6.90 % Final   Basophils 0.60  0.00 - 1.80 % Final   Immature Granulocytes 0.60  0.00 - 0.90 % Final   Nucleated RBC 0.00  /100 WBC Final   Neutrophils (Absolute) 7.05  2.00 - 7.15 K/uL Final   Comment:   Includes immature neutrophils, if present.   Lymphs (Absolute) 2.87  1.00 - 4.80 K/uL Final   Monos (Absolute) 0.79  0.00 - 0.85 K/uL Final   Eos (Absolute) 0.22  0.00 - 0.51 K/uL Final   Baso (Absolute) 0.07  0.00 - 0.12 K/uL Final   Immature Granulocytes (abs) 0.07  0.00 - 0.11 K/uL Final   NRBC (Absolute) 0.00  K/uL Final     Component Value Ref Range & Units Status   Sodium 137  135 - 145 mmol/L Final   Potassium 4.1  3.6 - 5.5  mmol/L Final   Chloride 99  96 - 112 mmol/L Final   Co2 25  20 - 33 mmol/L Final   Anion Gap 13.0   0.0 - 11.9 Final   Glucose 181   65 - 99 mg/dL Final   Bun 18  8 - 22 mg/dL Final   Creatinine 0.83  0.50 - 1.40 mg/dL Final   Calcium 9.7  8.5 - 10.5 mg/dL Final   AST(SGOT) 13  12 - 45 U/L Final   ALT(SGPT) 13  2 - 50 U/L Final   Alkaline Phosphatase 100   30 - 99 U/L Final   Total Bilirubin 0.3  0.1 - 1.5 mg/dL Final   Albumin 3.9  3.2 - 4.9 g/dL Final   Total Protein 7.1  6.0 - 8.2 g/dL Final   Globulin 3.2  1.9 - 3.5 g/dL Final   A-G Ratio 1.2  g/dL Final     Component Value Ref Range & Units Status   GFR If African American >60  >60 mL/min/1.73 m 2 Final   GFR If Non African American >60  >60 mL/min/1.73 m 2 Final     Component Value Ref Range & Units Status   Magnesium 2.0  1.5 - 2.5 mg/dL Final     Component Value Ref Range & Units Status   Digoxin 0.8  0.8 - 2.0 ng/mL Final      Component Value Ref Range & Units Status   POC Color yellow  Negative Final   POC Appearance cloudy  Negative Final   POC Leukocyte Esterase neg  Negative Final   POC Nitrites neg  Negative Final   POC Urobiligen neg  Negative (0.2) mg/dL Final   POC Protein neg  Negative mg/dL Final   POC Urine PH 5.0  5.0 - 8.0 Final   POC Blood trace  Negative Final   POC Specific Gravity 1.025  <1.005 - >1.030 Final   POC Ketones neg  Negative mg/dL Final   POC Bilirubin neg  Negative mg/dL Final   POC Glucose neg  Negative mg/dL Final     Assessment/Plan:     1. Urinary urgency  POCT Urinalysis    CBC WITH DIFFERENTIAL    COMP METABOLIC PANEL    DIGOXIN    MAGNESIUM    URINE CULTURE(NEW)   2. Diarrhea, unspecified type  CBC WITH DIFFERENTIAL    COMP METABOLIC PANEL    DIGOXIN    MAGNESIUM    URINE CULTURE(NEW)   3. Hematuria, unspecified type         Patient presents with 2-3 weeks of urinary urgency and frequency and diarrhea. Not taking stool softener or laxative. Was treated earlier February 4 dental prophylaxis with amoxicillin. Otherwise, no  other antibiotics the last 2 months. Patient was admitted in December for ablation and started on dofetilide which does have a 3% risk of diarrhea. Patient has been decreasing Lasix, normally takes two 40 mg tablets in the morning and another 6 hours after. Patient takes potassium 20 mEq 3 times a day. Patient states that she has only been taking 2 of the potassium 20 mEq since decreasing her Lasix to 40 mg once daily. Denies dizziness, fatigue, or nausea. No abdominal pain. Low suspicion for C. difficile. Plan to obtain basic blood work. Urine showed trace hematuria, plan to send for culture. Patient has follow-up with cardiology on Thursday. Advised she discuss medication management with her.     Patient was in agreement with this treatment plan and seemed to understand without barriers. All questions were encouraged and answered.  Reviewed signs and symptoms of when to seek emergency medical care.     Please note that this dictation was created using voice recognition software.  I have made every reasonable attempt to correct obvious errors, but I expect there are errors of suleiman and possibly content that I did not discover before finalizing the note.

## 2018-03-06 NOTE — PROGRESS NOTES
OP Anticoagulation Service Note    Date: 3/6/2018  Anticoagulation Summary  As of 3/6/2018    INR goal:   2.0-3.0   TTR:   70.0 % (2.7 y)   Today's INR:   2.7   Maintenance plan:   3.75 mg (2.5 mg x 1.5) on Mon, Wed; 2.5 mg (2.5 mg x 1) all other days   Weekly total:   20 mg   No change documented:   Jeramy Bartlett, Med Ass't   Plan last modified:   Juan Blanco, PharmD (2/20/2018)   Next INR check:   3/20/2018   Target end date:   Indefinite    Indications    Atrial fibrillation (CMS-HCC) (Resolved) [I48.91]  Chronic anticoagulation [Z79.01]             Anticoagulation Episode Summary     INR check location:   Home Draw    Preferred lab:       Send INR reminders to:       Comments:   [place]       Anticoagulation Care Providers     Provider Role Specialty Phone number    Lizzy Haywood M.D. Referring Cardiology [phone]    [facility] Antico[facility] Responsible  [phone]    Peter HollowayD Responsible          Anticoagulation Patient Findings  Patient Findings     Negatives:   Signs/symptoms of thrombosis, Signs/symptoms of bleeding, Laboratory test error suspected, Change in health, Change in alcohol use, Change in activity, Upcoming invasive procedure, Emergency department visit, Upcoming dental procedure, Missed doses, Extra doses, Change in medications, Change in diet/appetite, Hospital admission, Bruising, Other complaints        Plan: Spoke to patient. Patient is therapeutic and will remain on the same dose. Patient reports no unusual bleeding or bruising and no changes to medication or diet. Patient is to be checked again in 2 weeks.    Jeramy Bartlett  I have reviewed and agree with the plan above on  3/6/2018    Ivone Barraza, Pharm D

## 2018-03-07 NOTE — TELEPHONE ENCOUNTER
Called patient patient informed her of. Did note an elevated glucose and she may want to follow up with her primary care regarding this. Patient has follow with cardiology on Thursday and does not require further blood work as we did this today. All questions encouraged and answered.

## 2018-03-08 ENCOUNTER — OFFICE VISIT (OUTPATIENT)
Dept: CARDIOLOGY | Facility: MEDICAL CENTER | Age: 78
End: 2018-03-08
Payer: MEDICARE

## 2018-03-08 VITALS
BODY MASS INDEX: 30.29 KG/M2 | DIASTOLIC BLOOD PRESSURE: 72 MMHG | HEIGHT: 64 IN | HEART RATE: 96 BPM | WEIGHT: 177.4 LBS | SYSTOLIC BLOOD PRESSURE: 138 MMHG | OXYGEN SATURATION: 94 %

## 2018-03-08 DIAGNOSIS — I51.89 DIASTOLIC DYSFUNCTION: ICD-10-CM

## 2018-03-08 DIAGNOSIS — I10 ESSENTIAL HYPERTENSION: ICD-10-CM

## 2018-03-08 DIAGNOSIS — I48.19 PERSISTENT ATRIAL FIBRILLATION (HCC): ICD-10-CM

## 2018-03-08 DIAGNOSIS — I25.10 CORONARY ARTERY DISEASE INVOLVING NATIVE CORONARY ARTERY OF NATIVE HEART WITHOUT ANGINA PECTORIS: ICD-10-CM

## 2018-03-08 PROBLEM — I48.91 ATRIAL FIBRILLATION WITH RAPID VENTRICULAR RESPONSE (HCC): Status: RESOLVED | Noted: 2017-12-15 | Resolved: 2018-03-08

## 2018-03-08 PROCEDURE — 99214 OFFICE O/P EST MOD 30 MIN: CPT | Performed by: INTERNAL MEDICINE

## 2018-03-08 ASSESSMENT — ENCOUNTER SYMPTOMS
INSOMNIA: 0
MYALGIAS: 0
EYES NEGATIVE: 1
HEARTBURN: 0
DIZZINESS: 0
CONSTIPATION: 0
SHORTNESS OF BREATH: 0
DIARRHEA: 1
PALPITATIONS: 0
LOSS OF CONSCIOUSNESS: 0
COUGH: 0
NERVOUS/ANXIOUS: 0
BLOOD IN STOOL: 0
ABDOMINAL PAIN: 0
NAUSEA: 0
BRUISES/BLEEDS EASILY: 0
WEIGHT LOSS: 0

## 2018-03-08 NOTE — PROGRESS NOTES
"Subjective:   Donte Magaña is a 77y.o. female who presents today in follow-up in regards to her valvular heart disease, coronary disease and atrial fibrillation on anticoagulation and recent ablation    On dofetilide  Edema is still concerning her  No change in meds - liked seeing Dr Matthews   Now having diarrhea, worries that it is the new medicine   In with       Past Medical History:   Diagnosis Date   • A-fib (CMS-HCC)    • Anesthesia     \"Tachycardia for 5 days after cataract surgery\"   • Anticoagulant long-term use 1/12/2012   • Arthritis     Knees, hips   • Asthma     with pneumonia, nothing for 3 years   • Atrial fibrillation (CMS-HCC)    • Backpain    • Breath shortness     with exertion O2  2l/m PRN, hasnt used for 3 years   • Bronchitis Nov, 2013   • CAD (coronary artery disease)    • Depression    • Glaucoma 5/3/2011   • Hematoma complicating a procedure 11/3/2012   • High cholesterol    • Hypertension    • Hypothyroid    • Lupus    • Macular degeneration    • Menopause 1/12/2012   • Mitral regurgitation 10/30/2012   • Obesity 1/12/2012   • Pneumonia feb,2013   • Pulmonary hypertension 10/30/2012   • PVC's (premature ventricular contractions) 1/12/2012   • Senile nuclear sclerosis    • Spinal stenosis of lumbar region at multiple levels    • Unspecified cataract     repaired bilateral   • Unspecified urinary incontinence      Past Surgical History:   Procedure Laterality Date   • KNEE ARTHROPLASTY TOTAL Right 6/23/2016    Procedure: KNEE ARTHROPLASTY TOTAL;  Surgeon: eHriberto Lozada M.D.;  Location: [facility];  Service:    • KNEE ARTHROPLASTY TOTAL Left 5/28/2015    Procedure: KNEE ARTHROPLASTY TOTAL;  Surgeon: Heriberto Lozada M.D.;  Location: [facility];  Service:    • CATARACT PHACO WITH IOL Right 5/5/2015    Procedure: IOL OD - STANDARD;  Surgeon: Dmitry Bejarano M.D.;  Location: [facility] ORS;  Service:    • CATARACT PHACO WITH IOL  4/21/2015    " "Performed by Dmitry Bejarano M.D. at SUR[facility]ERY [facility]   • RECOVERY  11/30/2010    Performed by SURGERY, CATH-RECOVERY at SURGERY SAME DAY [facility] ORS   • COLONOSCOPY  2008    Normal    [facility]   • ABDOMINAL HYSTERECTOMY TOTAL  April 15,1975    still has ovaries   • OPEN REDUCTION      left ankle   • OTHER      Removed pins from left ankle   • TONSILLECTOMY AND ADENOIDECTOMY       Family History   Problem Relation Age of Onset   • Stroke Mother    • Diabetes Father    • Stroke Sister    • Heart Disease Brother    • Stroke Sister    • GI Daughter      Crohn's Disease   • Heart Disease Daughter      CHF   • Other Daughter      Chronic Pain--Lymphedema   • Cancer Paternal Aunt      History   Smoking Status   • Never Smoker   Smokeless Tobacco   • Never Used     Allergies   Allergen Reactions   • Amiodarone Hives     Throat and tongue itching   • Bactrim Shortness of Breath   • Claritin-D [Loratadine-Pseudoephedrine] Palpitations   • Clotrimazole      Stinging / burning on chest   • Metoprolol      Causes throat swelling   • Morphine      Hallucinations   • Qvar [Beclomethasone Dipropionate]      Pressure on heart     • Vibramycin Shortness of Breath   • Atorvastatin Calcium-Polysorbate 80      Muscle aches     • Augmentin      Unknown reaction   • Cipro Xr Swelling   • Diltiazem      rash   • Flecainide      dizziness   • Keflex Unspecified     Pt states \"Unsure\".   • Mucinex      GI Distress     • Tramadol      crying   • Tape Rash     Paper tape okay     Outpatient Encounter Prescriptions as of 3/8/2018   Medication Sig Dispense Refill   • estradiol (ESTRACE) 2 MG Tab TAKE ONE TABLET BY MOUTH EVERY DAY 90 Tab 3   • levothyroxine (SYNTHROID) 50 MCG Tab Take 1 Tab by mouth Every morning on an empty stomach. 30 Tab 3   • atenolol (TENORMIN) 25 MG Tab Take 1 Tab by mouth 2 times a day. Take extra 1 tab as needed palpitations 90 Tab 3   • magnesium oxide (MAG-OX) 400 (241.3 Mg) MG Tab tablet Take 1 Tab by " mouth every day. 30 Tab 6   • dofetilide (TIKOSYN) 500 MCG Cap Take 1 Cap by mouth every 12 hours. 60 Cap 3   • furosemide (LASIX) 40 MG Tab Take 40-80 mg by mouth 2 Times a Day. 80 mg in the Am and 40 mg at Noon     • warfarin (COUMADIN) 2.5 MG Tab Take 2.5-3.75 mg by mouth every day. 3.75 mg on Monday and Wednesday  2.5 mg all other days     • sertraline (ZOLOFT) 25 MG tablet Take 1 Tab by mouth every day. 90 Tab 3   • digoxin (LANOXIN) 125 MCG Tab Take 1 Tab by mouth every day. 90 Tab 3   • Multiple Vitamins-Minerals (ICAPS AREDS 2) Cap Take 2 Caps by mouth every day. 100 Cap 3   • potassium chloride SA (KDUR) 20 MEQ Tab CR Take 20 mEq by mouth 3 times a day. 180 Tab 3   • ranitidine (ZANTAC) 150 MG Tab Take 1 Tab by mouth every day. 90 Tab 3   • lisinopril (PRINIVIL) 5 MG Tab Take 1 Tab by mouth every day. 90 Tab 2   • Diclofenac Sodium 1 % Gel Apply  to affected area(s) 2 times a day as needed (For joint pain).     • acetaminophen (TYLENOL) 500 MG TABS Take 1,000 mg by mouth 3 times a day. Indications: Pain     • sennosides-docusate sodium (SENOKOT-S) 8.6-50 MG tablet Take 3 Tabs by mouth every day. 30 Tab    • vitamin D (CHOLECALCIFEROL) 1000 UNIT TABS Take 2,000 Units by mouth every day.     • [DISCONTINUED] magnesium oxide (MAG-OX) 400 MG Tab TAKE ONE TABLET BY MOUTH EVERY DAY 90 Tab 3   • [DISCONTINUED] docusate sodium (COLACE) 100 MG Cap Take 200 mg by mouth every evening.       No facility-administered encounter medications on file as of 3/8/2018.      Review of Systems   Constitutional: Negative for malaise/fatigue and weight loss.   Eyes: Negative.    Respiratory: Negative for cough and shortness of breath.    Cardiovascular: Positive for leg swelling. Negative for chest pain and palpitations.   Gastrointestinal: Positive for diarrhea. Negative for abdominal pain, blood in stool, constipation, heartburn, melena and nausea.   Musculoskeletal: Negative for joint pain and myalgias.        No changes  "  Neurological: Negative for dizziness and loss of consciousness.   Endo/Heme/Allergies: Does not bruise/bleed easily.   Psychiatric/Behavioral: The patient is not nervous/anxious and does not have insomnia.    All other systems reviewed and are negative.       Objective:   /72   Pulse 96   Ht 1.626 m (5' 4\")   Wt 80.5 kg (177 lb 6.4 oz)   LMP 01/01/1993   SpO2 94%   BMI 30.45 kg/m²     Physical Exam   Constitutional: She is oriented to person, place, and time. She appears well-developed and well-nourished.   HENT:   Head: Normocephalic and atraumatic.   Mouth/Throat: Mucous membranes are normal.   Eyes: EOM are normal. Pupils are equal, round, and reactive to light. No scleral icterus.   Neck: No JVD present. No thyroid mass and no thyromegaly present.   Cardiovascular: Normal rate, regular rhythm and intact distal pulses.    Murmur heard.  2/6 systolic blowing murmur;  heart rate 55 on my exam   Pulmonary/Chest: Effort normal and breath sounds normal. She has no wheezes.   Abdominal: Bowel sounds are normal.   Musculoskeletal: Normal range of motion. She exhibits edema (1+ nonpitting bilaterally no stasis changes or ulcers). She exhibits no tenderness.   Neurological: She is alert and oriented to person, place, and time. She has normal strength. She displays no tremor. She exhibits normal muscle tone. Coordination normal.   Skin: Skin is warm and dry. No rash noted.   Psychiatric: She has a normal mood and affect. Her behavior is normal.   Vitals reviewed.      Assessment:     1. Coronary artery disease involving native coronary artery of native heart without angina pectoris     2. Diastolic dysfunction     3. Essential hypertension     4. Persistent atrial fibrillation (CMS-HCC)         Medical Decision Making:  Today's Assessment / Status / Plan:     Atrial fibrillation   Persistent  But appears quiescent after ablation   Tolerating systemic anticoagulation   mild mitral regurgitation as above.   Lab " work from yesterday normal and reassuring no evidence of urine infection  She should take fiber, if diarrhea no better next week, I will find Dr. Matthews to see what we should do about the dofetilide  Normal echo last summer    Multiple drug sensitivities  I agree completely she has tried multiple medications she is on very high-dose AV izzy blockers and her symptoms are still quite bothersome to her  She feels very confident in Dr. Matthews and I told her I would support their plan fully    Coronary disease, moderate on angiogram 2013  Reassuring and normal nuclear perfusion imaging study 2016  Statin and aspirin    Polypharmacy and multiple drug allergies  As above  She has had complications in the past with procedures due to hematoma and a drug allergy  Reassurance given    Edema  Stasis, reassurance  Lasix as needed normal function of the kidneys and on potassium   Can try when necessary infusion if no better   I did refer her to podiatry as it does sound like she has some symptoms of neuropathy    Hypertension, good control for now  RTC 6 months, sooner with concerns

## 2018-03-09 ENCOUNTER — TELEPHONE (OUTPATIENT)
Dept: URGENT CARE | Facility: CLINIC | Age: 78
End: 2018-03-09

## 2018-03-09 DIAGNOSIS — N30.01 ACUTE CYSTITIS WITH HEMATURIA: ICD-10-CM

## 2018-03-09 LAB
BACTERIA UR CULT: ABNORMAL
BACTERIA UR CULT: ABNORMAL
SIGNIFICANT IND 70042: ABNORMAL
SITE SITE: ABNORMAL
SOURCE SOURCE: ABNORMAL

## 2018-03-09 RX ORDER — METRONIDAZOLE 500 MG/1
500 TABLET ORAL 2 TIMES DAILY
Qty: 14 TAB | Refills: 0 | Status: SHIPPED | OUTPATIENT
Start: 2018-03-09 | End: 2018-03-16

## 2018-03-10 NOTE — TELEPHONE ENCOUNTER
Called patient and informed her of labs. Elevated blood glucose which can make her more likely to have a urinary tract infection. Patient with continued increased urinary urgency or frequency. No burning. Continue with loose stool and diarrhea. Patient advised increased fiber by cardiologist advised her to continue with this. Given continued symptoms do plan to treat with Flagyl. Advised patient to contact Coumadin clinic as this will likely affect her INR.

## 2018-03-13 ENCOUNTER — TELEPHONE (OUTPATIENT)
Dept: VASCULAR LAB | Facility: MEDICAL CENTER | Age: 78
End: 2018-03-13

## 2018-03-13 LAB — INR PPP: 2.8 (ref 2–3.5)

## 2018-03-13 NOTE — TELEPHONE ENCOUNTER
[facility]    Pt has been on Flagyl for ~4 days.  Pt denies any unusual s/s of bleeding, bruising, or clotting.  Requested pt to obtain INR ASAP.      Juan Blanco, PharmD

## 2018-03-14 ENCOUNTER — ANTICOAGULATION MONITORING (OUTPATIENT)
Dept: VASCULAR LAB | Facility: MEDICAL CENTER | Age: 78
End: 2018-03-14

## 2018-03-14 DIAGNOSIS — Z79.01 CHRONIC ANTICOAGULATION: ICD-10-CM

## 2018-03-14 NOTE — PROGRESS NOTES
Anticoagulation Summary  As of 3/14/2018    INR goal:   2.0-3.0   TTR:   70.2 % (2.7 y)   Today's INR:   2.8 (3/13/2018)   Maintenance plan:   3.75 mg (2.5 mg x 1.5) on Mon, Wed; 2.5 mg (2.5 mg x 1) all other days   Weekly total:   20 mg   Plan last modified:   Juan lBanco, PharmD (2/20/2018)   Next INR check:   3/16/2018   Target end date:   Indefinite    Indications    Atrial fibrillation (CMS-HCC) (Resolved) [I48.91]  Chronic anticoagulation [Z79.01]             Anticoagulation Episode Summary     INR check location:   Home Draw    Preferred lab:       Send INR reminders to:       Comments:   Winston YEAGER      Anticoagulation Care Providers     Provider Role Specialty Phone number    Lizzy Haywood M.D. Referring Cardiology [phone]    [facility] Responsible  [phone]    Vladimir Taylor, PharmD Responsible          Anticoagulation Patient Findings    HPI:  Donte Magaña, on anticoagulation therapy with warfarin for AF.   Changes to current medical/health status since last appt: none.  Denies signs/symptoms of bleeding and/or thrombosis since the last appt.    Denies any interval changes to diet.  Pt started Flagyl about 48 hours ago.  Likely the DDI will kick in soon.   Denies any complications or cost restrictions with current therapy.     A/P   INR  therapeutic.   Reduce dose today then Pt is to continue with current warfarin dosing regimen.     Next INR in 2 days given new DDI.     Juan Blanco, PharmD

## 2018-03-16 ENCOUNTER — ANTICOAGULATION MONITORING (OUTPATIENT)
Dept: VASCULAR LAB | Facility: MEDICAL CENTER | Age: 78
End: 2018-03-16

## 2018-03-16 DIAGNOSIS — Z79.01 CHRONIC ANTICOAGULATION: ICD-10-CM

## 2018-03-16 LAB — INR PPP: 2.2 (ref 2–3.5)

## 2018-03-16 NOTE — PROGRESS NOTES
Anticoagulation Summary  As of 3/16/2018    INR goal:   2.0-3.0   TTR:   70.3 % (2.7 y)   Today's INR:   2.2   Maintenance plan:   3.75 mg (2.5 mg x 1.5) on Mon, Wed; 2.5 mg (2.5 mg x 1) all other days   Weekly total:   20 mg   Plan last modified:   Juan Blanco, PharmD (2/20/2018)   Next INR check:   3/23/2018   Target end date:   Indefinite    Indications    Atrial fibrillation (CMS-HCC) (Resolved) [I48.91]  Chronic anticoagulation [Z79.01]             Anticoagulation Episode Summary     INR check location:   Home Draw    Preferred lab:       Send INR reminders to:       Comments:   Winston YEAGER      Anticoagulation Care Providers     Provider Role Specialty Phone number    Lizzy Haywood M.D. Referring Cardiology [phone]    [facility] Responsible  [phone]    Vladimir Taylor, PharmD Responsible          Anticoagulation Patient Findings    Spoke with patient to report a therapeutic INR of 2.2.  Pt is on Flagyl until Sunday morning, confirms that she took a reduced dose on Wednesday.   Will have pt continue with her normal warfarin dosing, however take a reduced dose of 2.5mg x1 on Mondays.     Pt denies any s/s of bleeding, bruising, clotting or any changes to diet or medication.    Will follow up in 1 week(s).     Kelli Ma, PharmD

## 2018-03-20 ENCOUNTER — ANTICOAGULATION MONITORING (OUTPATIENT)
Dept: VASCULAR LAB | Facility: MEDICAL CENTER | Age: 78
End: 2018-03-20

## 2018-03-20 DIAGNOSIS — Z79.01 CHRONIC ANTICOAGULATION: ICD-10-CM

## 2018-03-20 LAB — INR PPP: 2.6 (ref 2–3.5)

## 2018-03-20 NOTE — PROGRESS NOTES
OP Anticoagulation Service Note    Date: 3/20/2018  Anticoagulation Summary  As of 3/20/2018    INR goal:   2.0-3.0   TTR:   70.4 % (2.7 y)   Today's INR:   2.6   Maintenance plan:   3.75 mg (2.5 mg x 1.5) on Mon, Wed; 2.5 mg (2.5 mg x 1) all other days   Weekly total:   20 mg   No change documented:   Jeramy Bartlett, Med Ass't   Plan last modified:   Juan Blanco, PharmD (2/20/2018)   Next INR check:   3/27/2018   Target end date:   Indefinite    Indications    Atrial fibrillation (CMS-HCC) (Resolved) [I48.91]  Chronic anticoagulation [Z79.01]             Anticoagulation Episode Summary     INR check location:   Home Draw    Preferred lab:       Send INR reminders to:       Comments:   Winston       Anticoagulation Care Providers     Provider Role Specialty Phone number    Lizzy Haywood M.D. Referring Cardiology [phone]    [facility] Responsible  [phone]    Vladimir Taylor, PharmD Responsible          Anticoagulation Patient Findings  Patient Findings     Negatives:   Signs/symptoms of thrombosis, Signs/symptoms of bleeding, Laboratory test error suspected, Change in health, Change in alcohol use, Change in activity, Upcoming invasive procedure, Emergency department visit, Upcoming dental procedure, Missed doses, Extra doses, Change in medications, Change in diet/appetite, Hospital admission, Bruising, Other complaints        Plan: Spoke to patient. Patient is therapeutic and will remain on the same dose. Patient reports no unusual bleeding or bruising and no changes to medication or diet. Patient is to be checked again in 1 week.    Jeramy Bartlett    I have reviewed and concur with the above plan     Fran Leyva, PeterD

## 2018-03-23 ENCOUNTER — TELEPHONE (OUTPATIENT)
Dept: CARDIOLOGY | Facility: MEDICAL CENTER | Age: 78
End: 2018-03-23

## 2018-03-23 NOTE — TELEPHONE ENCOUNTER
----- Message from Mariah Ríos sent at 3/23/2018  1:56 PM PDT -----  Regarding: atenolol advice  Contact: [phone]  [place]/mike    Pt calling for med advice, specifically on atenolol as she is having AFIB episodes.  Please call .  Pt stated please do NOT put msg on her MyChart, she would prefer to talk directly with you.    =====================================================    Called pt, pt reports that she was recently diagnosed of Bladder infection and just finished taking her Abx-Flagyl last Sun 3/18/18, pt reports during the time she was taking the Flagyl she will go Afib she will take extra dose of Atenolol as recommended by Dr Haywood and it will go away, but pt reports today, around 1215pm, she went to Afib, -160s she took extra dose of Atenolol but until now has not went away, pt denies any symptoms with it, BP is stable, pt reports she is due to take her scheduled second dose of Atenolol and Tikosyn around 0430pm, advise pt to take her Tikosyn and Atenolol dose as scheduled if HR  has not gotten down please give our office a call back or go to ER, pt verbalizes understanding    To Dr Haywood

## 2018-03-23 NOTE — TELEPHONE ENCOUNTER
Called pt, discussed Dr Haywood recommendations, pt reports she is allergic to Dilt and developed Rash when she took it before, pt reports her HR starts to slow down, it is between 70-80s now

## 2018-03-24 DIAGNOSIS — I10 ESSENTIAL HYPERTENSION: ICD-10-CM

## 2018-03-26 RX ORDER — LISINOPRIL 5 MG/1
TABLET ORAL
Qty: 90 TAB | Refills: 3 | Status: SHIPPED | OUTPATIENT
Start: 2018-03-26 | End: 2019-03-30 | Stop reason: SDUPTHER

## 2018-03-27 ENCOUNTER — ANTICOAGULATION MONITORING (OUTPATIENT)
Dept: VASCULAR LAB | Facility: MEDICAL CENTER | Age: 78
End: 2018-03-27

## 2018-03-27 DIAGNOSIS — Z79.01 CHRONIC ANTICOAGULATION: ICD-10-CM

## 2018-03-27 LAB — INR PPP: 2.8 (ref 2–3.5)

## 2018-03-27 NOTE — PROGRESS NOTES
OP Anticoagulation Service Note    Date: 3/27/2018  Anticoagulation Summary  As of 3/27/2018    INR goal:   2.0-3.0   TTR:   70.6 % (2.8 y)   Today's INR:   2.8   Maintenance plan:   3.75 mg (2.5 mg x 1.5) on Mon, Wed; 2.5 mg (2.5 mg x 1) all other days   Weekly total:   20 mg   No change documented:   Jeramy Bartlett, Med Ass't   Plan last modified:   Juan Blanco, PharmD (2/20/2018)   Next INR check:   4/10/2018   Target end date:   Indefinite    Indications    Atrial fibrillation (CMS-HCC) (Resolved) [I48.91]  Chronic anticoagulation [Z79.01]             Anticoagulation Episode Summary     INR check location:   Home Draw    Preferred lab:       Send INR reminders to:       Comments:   Winston       Anticoagulation Care Providers     Provider Role Specialty Phone number    Lizzy Haywood M.D. Referring Cardiology [phone]    [facility] Responsible  [phone]    Vladimir Taylor, PharmD Responsible          Anticoagulation Patient Findings  Patient Findings     Negatives:   Signs/symptoms of thrombosis, Signs/symptoms of bleeding, Laboratory test error suspected, Change in health, Change in alcohol use, Change in activity, Upcoming invasive procedure, Emergency department visit, Upcoming dental procedure, Missed doses, Extra doses, Change in medications, Change in diet/appetite, Hospital admission, Bruising, Other complaints        Plan: Spoke to patient. Patient is therapeutic and will remain on the same dose. Patient reports no unusual bleeding or bruising and no changes to medication or diet. Patient is to be checked again in 2 weeks.    Jeramy Bartlett    I have reviewed and agree with the plan above on 03/27/2018      Massiel Coleman, PeterD

## 2018-04-03 ENCOUNTER — TELEPHONE (OUTPATIENT)
Dept: CARDIOLOGY | Facility: MEDICAL CENTER | Age: 78
End: 2018-04-03

## 2018-04-03 NOTE — TELEPHONE ENCOUNTER
Pt called, pt reports she's having runs of diarrhea today, she reports around 0245pm she checked her HR and it is between 130-150s and she feels like she's on Afib again, she took her second dose of her Tikosyn and Atenolol and so far HR has not come down yet, she wants to know when can he take his 3rd Atenolol, instructed pt to take Atenolol now and if HR has not come down to call our office or seek emergency services, instructed her also to call PCP if her diarrhea worsens or continued, pt appreciative and verbalizes understanding.

## 2018-04-10 ENCOUNTER — ANTICOAGULATION MONITORING (OUTPATIENT)
Dept: VASCULAR LAB | Facility: MEDICAL CENTER | Age: 78
End: 2018-04-10

## 2018-04-10 DIAGNOSIS — Z79.01 CHRONIC ANTICOAGULATION: ICD-10-CM

## 2018-04-10 LAB — INR PPP: 2.9 (ref 2–3.5)

## 2018-04-10 NOTE — PROGRESS NOTES
OP Anticoagulation Telephone Note    Date: 4/10/2018  Anticoagulation Summary  As of 4/10/2018    INR goal:   2.0-3.0   TTR:   71.0 % (2.8 y)   Today's INR:   2.9   Maintenance plan:   3.75 mg (2.5 mg x 1.5) on Mon, Wed; 2.5 mg (2.5 mg x 1) all other days   Weekly total:   20 mg   No change documented:   Roxana Mercedes, Med Ass't   Plan last modified:   Juan Blanco, PharmD (2/20/2018)   Next INR check:   4/24/2018   Target end date:   Indefinite    Indications    Atrial fibrillation (CMS-HCC) (Resolved) [I48.91]  Chronic anticoagulation [Z79.01]             Anticoagulation Episode Summary     INR check location:   Home Draw    Preferred lab:       Send INR reminders to:       Comments:   Winston YEAGER      Anticoagulation Care Providers     Provider Role Specialty Phone number    Lizzy Haywood M.D. Referring Cardiology [phone]    [facility] Responsible  [phone]    Vladimir Taylor, PharmD Responsible          Anticoagulation Patient Findings  Patient Findings     Negatives:   Signs/symptoms of thrombosis, Signs/symptoms of bleeding, Laboratory test error suspected, Change in health, Change in alcohol use, Change in activity, Upcoming invasive procedure, Emergency department visit, Upcoming dental procedure, Missed doses, Extra doses, Change in medications, Change in diet/appetite, Hospital admission, Bruising, Other complaints      Plan:  Spoke with patient on the phone. Patient is therapeutic today. Patient denies any changes in medications or diet. Patient denies any signs or symptoms of bleeding or clotting. Instructed patient to call clinic if any unusual bleeding or bruising occurs. Will continue dosing as outlined above. Will follow-up with patient in 2 weeks.    Roxana Mercedes, Medical Assistant    I have reviewed and am in agreement with the above stated plan on 4-10-18.  Vladimir Taylor, PharmD

## 2018-04-16 DIAGNOSIS — I48.19 PERSISTENT ATRIAL FIBRILLATION (HCC): ICD-10-CM

## 2018-04-16 RX ORDER — DOFETILIDE 0.5 MG/1
500 CAPSULE ORAL EVERY 12 HOURS
Qty: 60 CAP | Refills: 3 | Status: CANCELLED | OUTPATIENT
Start: 2018-04-16

## 2018-04-17 DIAGNOSIS — I10 ESSENTIAL HYPERTENSION: ICD-10-CM

## 2018-04-17 DIAGNOSIS — R60.0 LOCALIZED EDEMA: ICD-10-CM

## 2018-04-17 RX ORDER — POTASSIUM CHLORIDE 20 MEQ/1
20 TABLET, EXTENDED RELEASE ORAL 2 TIMES DAILY
Qty: 100 TAB | Refills: 3
Start: 2018-04-17 | End: 2018-05-30

## 2018-04-18 DIAGNOSIS — I48.19 PERSISTENT ATRIAL FIBRILLATION (HCC): Primary | ICD-10-CM

## 2018-04-18 RX ORDER — DOFETILIDE 0.5 MG/1
500 CAPSULE ORAL EVERY 12 HOURS
Qty: 60 CAP | Refills: 6 | Status: SHIPPED | OUTPATIENT
Start: 2018-04-18 | End: 2018-06-29

## 2018-04-24 ENCOUNTER — ANTICOAGULATION MONITORING (OUTPATIENT)
Dept: VASCULAR LAB | Facility: MEDICAL CENTER | Age: 78
End: 2018-04-24

## 2018-04-24 DIAGNOSIS — Z79.01 CHRONIC ANTICOAGULATION: ICD-10-CM

## 2018-04-24 LAB — INR PPP: 3 (ref 2–3.5)

## 2018-04-25 NOTE — PROGRESS NOTES
OP Telephone Anticoagulation Service Note    Date: 4/24/2018      Anticoagulation Summary  As of 4/24/2018    INR goal:   2.0-3.0   TTR:   71.4 % (2.8 y)   Today's INR:   3.0   Maintenance plan:   3.75 mg (2.5 mg x 1.5) on Mon, Wed; 2.5 mg (2.5 mg x 1) all other days   Weekly total:   20 mg   Plan last modified:   Juan Blanco, PharmD (2/20/2018)   Next INR check:   5/8/2018   Target end date:   Indefinite    Indications    Atrial fibrillation (CMS-HCC) (Resolved) [I48.91]  Chronic anticoagulation [Z79.01]             Anticoagulation Episode Summary     INR check location:   Home Draw    Preferred lab:       Send INR reminders to:       Comments:   Winston YEAGER      Anticoagulation Care Providers     Provider Role Specialty Phone number    Lizzy Haywood M.D. Referring Cardiology [phone]    [facility] Responsible  [phone]    Vladimir Taylor, PharmD Responsible          Anticoagulation Patient Findings        Plan: Spoke with patient on the phone. Patient is therapeutic today. Confirmed dosing. No missed tablets in the last week. Patient denies any changes in medications or diet. Patient denies any signs or symptoms of bleeding or clotting. Instructed patient to call clinic if any unusual bleeding or bruising occurs. Will continue dosing as outlined. Will follow-up with patient in 2 week(s).        Ivone Barraza, PeterD

## 2018-04-30 ENCOUNTER — OFFICE VISIT (OUTPATIENT)
Dept: CARDIOLOGY | Facility: MEDICAL CENTER | Age: 78
End: 2018-04-30
Payer: MEDICARE

## 2018-04-30 VITALS
HEART RATE: 68 BPM | DIASTOLIC BLOOD PRESSURE: 72 MMHG | BODY MASS INDEX: 30.56 KG/M2 | SYSTOLIC BLOOD PRESSURE: 134 MMHG | OXYGEN SATURATION: 95 % | WEIGHT: 179 LBS | HEIGHT: 64 IN

## 2018-04-30 DIAGNOSIS — I51.89 DIASTOLIC DYSFUNCTION: ICD-10-CM

## 2018-04-30 DIAGNOSIS — I10 ESSENTIAL HYPERTENSION: ICD-10-CM

## 2018-04-30 DIAGNOSIS — I48.19 PERSISTENT ATRIAL FIBRILLATION (HCC): ICD-10-CM

## 2018-04-30 DIAGNOSIS — I25.10 CORONARY ARTERY DISEASE INVOLVING NATIVE CORONARY ARTERY OF NATIVE HEART WITHOUT ANGINA PECTORIS: ICD-10-CM

## 2018-04-30 DIAGNOSIS — Z79.01 CHRONIC ANTICOAGULATION: ICD-10-CM

## 2018-04-30 PROBLEM — R60.0 LOCALIZED EDEMA: Status: RESOLVED | Noted: 2017-08-14 | Resolved: 2018-04-30

## 2018-04-30 PROCEDURE — 99214 OFFICE O/P EST MOD 30 MIN: CPT | Performed by: INTERNAL MEDICINE

## 2018-04-30 ASSESSMENT — ENCOUNTER SYMPTOMS
SHORTNESS OF BREATH: 0
MYALGIAS: 0
COUGH: 0
EYES NEGATIVE: 1
BRUISES/BLEEDS EASILY: 0
NERVOUS/ANXIOUS: 0
HEARTBURN: 0
LOSS OF CONSCIOUSNESS: 0
PALPITATIONS: 0
NAUSEA: 0
CONSTIPATION: 0
ABDOMINAL PAIN: 0
BLOOD IN STOOL: 0
INSOMNIA: 0
DIZZINESS: 0
DIARRHEA: 0
WEIGHT LOSS: 0

## 2018-04-30 NOTE — PROGRESS NOTES
"Subjective:   Donte Magaña is a 77y.o. female who presents today in follow-up in regards to her valvular heart disease, coronary disease and atrial fibrillation on anticoagulation and 2017 ablation    On dofetilide and atenolol   Has been getting a mitral procedure, she worries about him, edema not a problem anymore   Several days which she had to take an extra atenolol for fast heart rates, blood pressure high not low     No change in meds       Past Medical History:   Diagnosis Date   • A-fib (HCC)    • Anesthesia     \"Tachycardia for 5 days after cataract surgery\"   • Anticoagulant long-term use 1/12/2012   • Arthritis     Knees, hips   • Asthma     with pneumonia, nothing for 3 years   • Atrial fibrillation (HCC)    • Backpain    • Breath shortness     with exertion O2  2l/m PRN, hasnt used for 3 years   • Bronchitis Nov, 2013   • CAD (coronary artery disease)    • Depression    • Glaucoma 5/3/2011   • Hematoma complicating a procedure 11/3/2012   • High cholesterol    • Hypertension    • Hypothyroid    • Lupus    • Macular degeneration    • Menopause 1/12/2012   • Mitral regurgitation 10/30/2012   • Obesity 1/12/2012   • Pneumonia feb,2013   • Pulmonary hypertension (HCC) 10/30/2012   • PVC's (premature ventricular contractions) 1/12/2012   • Senile nuclear sclerosis    • Spinal stenosis of lumbar region at multiple levels    • Unspecified cataract     repaired bilateral   • Unspecified urinary incontinence      Past Surgical History:   Procedure Laterality Date   • KNEE ARTHROPLASTY TOTAL Right 6/23/2016    Procedure: KNEE ARTHROPLASTY TOTAL;  Surgeon: Heriberto Lozada M.D.;  Location: SURGERY [facility];  Service:    • KNEE ARTHROPLASTY TOTAL Left 5/28/2015    Procedure: KNEE ARTHROPLASTY TOTAL;  Surgeon: Heriberto Lozada M.D.;  Location: [facility];  Service:    • CATARACT PHACO WITH IOL Right 5/5/2015    Procedure: IOL OD - STANDARD;  Surgeon: Dmitry Bejarano M.D.;  Location: [facility]" "[facility];  Service:    • CATARACT PHACO WITH IOL  4/21/2015    Performed by Dmitry Bejarano M.D. at SURGERY [facility]   • RECOVERY  11/30/2010    Performed by SURGERY, [facility]-RECOVERY at SURGERY SAME DAY [facility] ORS   • COLONOSCOPY  2008    Normal    [facility]   • ABDOMINAL HYSTERECTOMY TOTAL  April 15,1975    still has ovaries   • OPEN REDUCTION      left ankle   • OTHER      Removed pins from left ankle   • TONSILLECTOMY AND ADENOIDECTOMY       Family History   Problem Relation Age of Onset   • Stroke Mother    • Diabetes Father    • Stroke Sister    • Heart Disease Brother    • Stroke Sister    • GI Daughter      Crohn's Disease   • Heart Disease Daughter      CHF   • Other Daughter      Chronic Pain--Lymphedema   • Cancer Paternal Aunt      History   Smoking Status   • Never Smoker   Smokeless Tobacco   • Never Used     Allergies   Allergen Reactions   • Amiodarone Hives     Throat and tongue itching   • Bactrim Shortness of Breath   • Claritin-D [Loratadine-Pseudoephedrine] Palpitations   • Clotrimazole      Stinging / burning on chest   • Metoprolol      Causes throat swelling   • Morphine      Hallucinations   • Qvar [Beclomethasone Dipropionate]      Pressure on heart     • Vibramycin Shortness of Breath   • Atorvastatin Calcium-Polysorbate 80      Muscle aches     • Augmentin      Unknown reaction   • Cipro Xr Swelling   • Diltiazem      rash   • Flecainide      dizziness   • Keflex Unspecified     Pt states \"Unsure\".   • Mucinex      GI Distress     • Tramadol      crying   • Tape Rash     Paper tape okay     Outpatient Encounter Prescriptions as of 4/30/2018   Medication Sig Dispense Refill   • raNITidine (ZANTAC) 150 MG Tab TAKE ONE TABLET BY MOUTH TWICE DAILY 180 Tab 3   • dofetilide (TIKOSYN) 500 MCG Cap Take 1 Cap by mouth every 12 hours. 60 Cap 6   • potassium chloride SA (KDUR) 20 MEQ Tab CR Take 1 Tab by mouth 2 times a day. 100 Tab 3   • lisinopril (PRINIVIL) 5 MG Tab TAKE " ONE TABLET BY MOUTH EVERY DAY 90 Tab 3   • estradiol (ESTRACE) 2 MG Tab TAKE ONE TABLET BY MOUTH EVERY DAY 90 Tab 3   • levothyroxine (SYNTHROID) 50 MCG Tab Take 1 Tab by mouth Every morning on an empty stomach. 30 Tab 3   • atenolol (TENORMIN) 25 MG Tab Take 1 Tab by mouth 2 times a day. Take extra 1 tab as needed palpitations 90 Tab 3   • magnesium oxide (MAG-OX) 400 (241.3 Mg) MG Tab tablet Take 1 Tab by mouth every day. 30 Tab 6   • furosemide (LASIX) 40 MG Tab Take 40-80 mg by mouth 2 Times a Day. 80 mg in the Am and 40 mg at Noon     • warfarin (COUMADIN) 2.5 MG Tab Take 2.5-3.75 mg by mouth every day. 3.75 mg on Monday and Wednesday  2.5 mg all other days     • sertraline (ZOLOFT) 25 MG tablet Take 1 Tab by mouth every day. 90 Tab 3   • digoxin (LANOXIN) 125 MCG Tab Take 1 Tab by mouth every day. 90 Tab 3   • Multiple Vitamins-Minerals (ICAPS AREDS 2) Cap Take 2 Caps by mouth every day. 100 Cap 3   • Diclofenac Sodium 1 % Gel Apply  to affected area(s) 2 times a day as needed (For joint pain).     • acetaminophen (TYLENOL) 500 MG TABS Take 1,000 mg by mouth 3 times a day. Indications: Pain     • sennosides-docusate sodium (SENOKOT-S) 8.6-50 MG tablet Take 3 Tabs by mouth every day. 30 Tab    • vitamin D (CHOLECALCIFEROL) 1000 UNIT TABS Take 2,000 Units by mouth every day.       No facility-administered encounter medications on file as of 4/30/2018.      Review of Systems   Constitutional: Negative for malaise/fatigue and weight loss.   Eyes: Negative.    Respiratory: Negative for cough and shortness of breath.    Cardiovascular: Negative for chest pain, palpitations and leg swelling.   Gastrointestinal: Negative for abdominal pain, blood in stool, constipation, diarrhea, heartburn, melena and nausea.   Musculoskeletal: Negative for joint pain and myalgias.        No changes   Neurological: Negative for dizziness and loss of consciousness.   Endo/Heme/Allergies: Does not bruise/bleed easily.  "  Psychiatric/Behavioral: The patient is not nervous/anxious and does not have insomnia.    All other systems reviewed and are negative.       Objective:   /72   Pulse 68   Ht 1.626 m (5' 4\")   Wt 81.2 kg (179 lb)   LMP 01/01/1993   SpO2 95%   BMI 30.73 kg/m²     Physical Exam   Constitutional: She is oriented to person, place, and time. She appears well-developed and well-nourished.   HENT:   Head: Normocephalic and atraumatic.   Mouth/Throat: Mucous membranes are normal.   Eyes: EOM are normal. Pupils are equal, round, and reactive to light. No scleral icterus.   Neck: No JVD present. No thyroid mass and no thyromegaly present.   Cardiovascular: Normal rate, regular rhythm and intact distal pulses.    Murmur heard.  2/6 systolic blowing murmur;  heart rate 65 on my exam   Pulmonary/Chest: Effort normal and breath sounds normal. She has no wheezes.   Abdominal: Bowel sounds are normal.   Musculoskeletal: Normal range of motion. She exhibits edema (1+ nonpitting bilaterally no stasis changes or ulcers). She exhibits no tenderness.   Neurological: She is alert and oriented to person, place, and time. She has normal strength. She displays no tremor. She exhibits normal muscle tone. Coordination normal.   Skin: Skin is warm and dry. No rash noted.   Psychiatric: She has a normal mood and affect. Her behavior is normal.   Vitals reviewed.      Assessment:     1. Coronary artery disease involving native coronary artery of native heart without angina pectoris     2. Chronic anticoagulation     3. Diastolic dysfunction     4. Essential hypertension     5. Persistent atrial fibrillation (HCC)         Medical Decision Making:  Today's Assessment / Status / Plan:     Atrial fibrillation   Persistent  Quiescent after ablation   Tolerating systemic anticoagulation - recent CBC normal and reassuring  mild mitral regurgitation as above. Annual echo due in the fall/winter   Lab work from March normal and reassuring "   Would not change atenolol at this point but okay to take an extra when necessary    Multiple drug sensitivities  I agree completely she has tried multiple medications she is on very high-dose AV izzy blockers and her symptoms better on extra atenolol   She feels very confident in Dr. Matthews and I told her I would support their plan fully    Coronary disease, moderate on angiogram 2013  Reassuring and normal nuclear perfusion imaging study 2016  Statin and aspirin    Polypharmacy   As above  She has had complications in the past with procedures due to hematoma and a drug allergy  Reassurance given    Edema  Stasis, reassurance  Lasix as needed normal function of the kidneys and on potassium   Can try when necessary infusion if no better   I did refer her to podiatry as it does sound like she has some symptoms of neuropathy    Hypertension, good control for now  RTC 6 months, sooner with concerns        Physical Exam   Constitutional: She is oriented to person, place, and time. She appears well-developed and well-nourished.   HENT:   Head: Normocephalic and atraumatic.   Mouth/Throat: Mucous membranes are normal.   Eyes: EOM are normal. Pupils are equal, round, and reactive to light. No scleral icterus.   Neck: No JVD present. No thyroid mass and no thyromegaly present.   Cardiovascular: Normal rate, regular rhythm and intact distal pulses.    Murmur heard.  2/6 systolic blowing murmur;  heart rate 65 on my exam   Pulmonary/Chest: Effort normal and breath sounds normal. She has no wheezes.   Abdominal: Bowel sounds are normal.   Musculoskeletal: Normal range of motion. She exhibits edema (1+ nonpitting bilaterally no stasis changes or ulcers). She exhibits no tenderness.   Neurological: She is alert and oriented to person, place, and time. She has normal strength. She displays no tremor. She exhibits normal muscle tone. Coordination normal.   Skin: Skin is warm and dry. No rash noted.   Psychiatric: She has a normal  mood and affect. Her behavior is normal.   Vitals reviewed.

## 2018-05-08 ENCOUNTER — ANTICOAGULATION MONITORING (OUTPATIENT)
Dept: VASCULAR LAB | Facility: MEDICAL CENTER | Age: 78
End: 2018-05-08

## 2018-05-08 DIAGNOSIS — Z79.01 CHRONIC ANTICOAGULATION: ICD-10-CM

## 2018-05-08 LAB — INR PPP: 3.1 (ref 2–3.5)

## 2018-05-08 NOTE — PROGRESS NOTES
OP Telephone Anticoagulation Service Note    Date: 5/8/2018      Anticoagulation Summary  As of 5/8/2018    INR goal:   2.0-3.0   TTR:   70.5 % (2.9 y)   Today's INR:   3.1!   Warfarin maintenance plan:   3.75 mg (2.5 mg x 1.5) on Mon; 2.5 mg (2.5 mg x 1) all other days   Weekly warfarin total:   18.75 mg   Plan last modified:   Ivone Barraza PharmD (5/8/2018)   Next INR check:   5/22/2018   Target end date:   Indefinite    Indications    Atrial fibrillation (HCC) (Resolved) [I48.91]  Chronic anticoagulation [Z79.01]             Anticoagulation Episode Summary     INR check location:   Home Draw    Preferred lab:       Send INR reminders to:       Comments:   Winston YEAGER      Anticoagulation Care Providers     Provider Role Specialty Phone number    Lizzy Haywood M.D. Referring Cardiology [phone]    [facility] Anticoagulation [facility] Responsible  [phone]    Peter HollowayD Responsible          Anticoagulation Patient Findings        Plan: Spoke with patient's  on the phone. Patient is supratherapeutic today. Confirmed dosing. No missed tablets in the last week. Patient denies any changes in medications or diet. Patient denies any signs or symptoms of bleeding or clotting. Instructed patient to call clinic if any unusual bleeding or bruising occurs. Will have pt begin lower weekly regimen as her last INR was 3.0. Will follow-up with patient in 2 week(s).              Ivone Barraza PharmD

## 2018-05-22 ENCOUNTER — ANTICOAGULATION MONITORING (OUTPATIENT)
Dept: VASCULAR LAB | Facility: MEDICAL CENTER | Age: 78
End: 2018-05-22

## 2018-05-22 DIAGNOSIS — Z79.01 CHRONIC ANTICOAGULATION: ICD-10-CM

## 2018-05-22 LAB — INR PPP: 3 (ref 2–3.5)

## 2018-05-22 NOTE — PROGRESS NOTES
OP Anticoagulation Telephone Note    Date: 5/22/2018  Anticoagulation Summary  As of 5/22/2018    INR goal:   2.0-3.0   TTR:   69.5 % (2.9 y)   Today's INR:   3.0   Warfarin maintenance plan:   3.75 mg (2.5 mg x 1.5) on Mon; 2.5 mg (2.5 mg x 1) all other days   Weekly warfarin total:   18.75 mg   No change documented:   Roxana Mercedes, Med Ass't   Plan last modified:   Ivone Barraza, Tim (5/8/2018)   Next INR check:   6/5/2018   Target end date:   Indefinite    Indications    Atrial fibrillation (HCC) (Resolved) [I48.91]  Chronic anticoagulation [Z79.01]             Anticoagulation Episode Summary     INR check location:   Home Draw    Preferred lab:       Send INR reminders to:       Comments:   Winston YEAGER      Anticoagulation Care Providers     Provider Role Specialty Phone number    Lizzy Haywood M.D. Referring Cardiology [phone]    [facility]tico[facility] Responsible  [phone]    Vladimir Taylor, PharmD Responsible          Anticoagulation Patient Findings  Patient Findings     Negatives:   Signs/symptoms of thrombosis, Signs/symptoms of bleeding, Laboratory test error suspected, Change in health, Change in alcohol use, Change in activity, Upcoming invasive procedure, Emergency department visit, Upcoming dental procedure, Missed doses, Extra doses, Change in medications, Change in diet/appetite, Hospital admission, Bruising, Other complaints      Plan:  Spoke with patient on the phone. Patient is therapeutic today. Patient denies any changes in medications or diet. Patient denies any signs or symptoms of bleeding or clotting. Instructed patient to call clinic if any unusual bleeding or bruising occurs. Will continue dosing as outlined above. Will follow-up with patient in 2 weeks.    Roxana Mercedes, Medical Assistant    I have reviewed and concur with the above plan     Fran Leyva, PeterD

## 2018-05-30 ENCOUNTER — HOSPITAL ENCOUNTER (OUTPATIENT)
Dept: RADIOLOGY | Facility: MEDICAL CENTER | Age: 78
End: 2018-05-30
Attending: FAMILY MEDICINE
Payer: MEDICARE

## 2018-05-30 ENCOUNTER — OFFICE VISIT (OUTPATIENT)
Dept: MEDICAL GROUP | Facility: PHYSICIAN GROUP | Age: 78
End: 2018-05-30
Payer: MEDICARE

## 2018-05-30 VITALS
HEART RATE: 96 BPM | WEIGHT: 182 LBS | TEMPERATURE: 97.5 F | SYSTOLIC BLOOD PRESSURE: 134 MMHG | HEIGHT: 64 IN | OXYGEN SATURATION: 94 % | RESPIRATION RATE: 16 BRPM | BODY MASS INDEX: 31.07 KG/M2 | DIASTOLIC BLOOD PRESSURE: 92 MMHG

## 2018-05-30 DIAGNOSIS — R60.0 LOCALIZED EDEMA: ICD-10-CM

## 2018-05-30 DIAGNOSIS — E66.9 OBESITY (BMI 30-39.9): ICD-10-CM

## 2018-05-30 DIAGNOSIS — M17.0 PRIMARY OSTEOARTHRITIS OF BOTH KNEES: ICD-10-CM

## 2018-05-30 DIAGNOSIS — R19.7 DIARRHEA, UNSPECIFIED TYPE: ICD-10-CM

## 2018-05-30 DIAGNOSIS — M54.41 RIGHT-SIDED LOW BACK PAIN WITH RIGHT-SIDED SCIATICA, UNSPECIFIED CHRONICITY: ICD-10-CM

## 2018-05-30 PROCEDURE — 99214 OFFICE O/P EST MOD 30 MIN: CPT | Performed by: FAMILY MEDICINE

## 2018-05-30 PROCEDURE — 72100 X-RAY EXAM L-S SPINE 2/3 VWS: CPT

## 2018-05-30 RX ORDER — SPIRONOLACTONE 50 MG/1
50 TABLET, FILM COATED ORAL DAILY
Qty: 30 TAB | Refills: 3 | Status: SHIPPED | OUTPATIENT
Start: 2018-05-30 | End: 2018-06-29

## 2018-05-30 RX ORDER — FUROSEMIDE 40 MG/1
80 TABLET ORAL DAILY
Qty: 60 TAB | Refills: 3 | Status: SHIPPED | OUTPATIENT
Start: 2018-05-30 | End: 2018-08-13 | Stop reason: SDUPTHER

## 2018-05-31 ENCOUNTER — TELEPHONE (OUTPATIENT)
Dept: CARDIOLOGY | Facility: MEDICAL CENTER | Age: 78
End: 2018-05-31

## 2018-05-31 DIAGNOSIS — I48.19 PERSISTENT ATRIAL FIBRILLATION (HCC): ICD-10-CM

## 2018-05-31 NOTE — TELEPHONE ENCOUNTER
Doubt tikosyn is causing diarrhea but its possible and if she convinced she could come off. Sotalol not really an option. Should at least get a Holter and come in to see either me or kirit or ramona

## 2018-05-31 NOTE — TELEPHONE ENCOUNTER
Donte Alicea Góme[place]ia Haywood M.D. 3 hours ago (10:13 AM)        Good morning Dr. GOLDMAN.   I went to see Dr. Price yesterday for several reasons.  He ordered xray for my back & gave me a referral.   Because I have had diarhear for two &  a half mos. , he changed some of my meds.  He deleted Potassium &   changed my lasix to 2 instead of 3 a day.   He added spironolactone 50 mg tabs to my list.     I also told him that I also think that my problem is an allergy to Tikosyn.   He said I would need to check with   you on that.  Also,  my heart rate has been very erratic.  I have afib a lot.  And sometimes the rate will go to   49 to 50.  Any help you can give me I would appreciate.  Should I go ahead & take 3 atenolol a day?  Please   tell what to do.  I think Dr. Matthews said there were 2 meds.  If so, maybe I could try #2         From: Lizzy Haywood M.D.     5/31/18 10:55 AM   Can you (or DSs RN if you are not it) help?     I dont know what #2 is, unless sotalol...     Tx!   ==============================================    To Dr. Matthews.

## 2018-05-31 NOTE — PROGRESS NOTES
"Patient comes in complaining that she has edema in her ankles.  She takes 240 mg Lasix in the morning and then 140 mg Lasix in the afternoon.  This causes her to be too much needing to be near toilets.  She wonders what else she can do.  She has no chest pains or shortness of breath.  She does continue to have intermittent atrial fibrillation.  She has got low back pain which is getting worse.  She would like to get x-rays.  She would like to see a spine specialist about this.  She is working on losing weight.  I encouraged her.  She has diarrhea that has not always responded to Kaopectate.  She has no blood in her stools.  She wonders what she can take.    I reviewed the following    Past Medical History:   Diagnosis Date   • A-fib (HCC)    • Anesthesia     \"Tachycardia for 5 days after cataract surgery\"   • Anticoagulant long-term use 1/12/2012   • Arthritis     Knees, hips   • Asthma     with pneumonia, nothing for 3 years   • Atrial fibrillation (HCC)    • Backpain    • Breath shortness     with exertion O2  2l/m PRN, hasnt used for 3 years   • Bronchitis Nov, 2013   • CAD (coronary artery disease)    • Depression    • Glaucoma 5/3/2011   • Hematoma complicating a procedure 11/3/2012   • High cholesterol    • Hypertension    • Hypothyroid    • Lupus    • Macular degeneration    • Menopause 1/12/2012   • Mitral regurgitation 10/30/2012   • Obesity 1/12/2012   • Pneumonia feb,2013   • Pulmonary hypertension (HCC) 10/30/2012   • PVC's (premature ventricular contractions) 1/12/2012   • Senile nuclear sclerosis    • Spinal stenosis of lumbar region at multiple levels    • Unspecified cataract     repaired bilateral   • Unspecified urinary incontinence         Past Surgical History:   Procedure Laterality Date   • KNEE ARTHROPLASTY TOTAL Right 6/23/2016    Procedure: KNEE ARTHROPLASTY TOTAL;  Surgeon: Heriberto Lozada M.D.;  Location: SURGERY [facility];  Service:    • KNEE ARTHROPLASTY TOTAL Left 5/28/2015    " "Procedure: KNEE ARTHROPLASTY TOTAL;  Surgeon: Heriberto Lozada M.D.;  Location: SURGERY [facility];  Service:    • CATARACT PHACO WITH IOL Right 5/5/2015    Procedure: IOL OD - STANDARD;  Surgeon: Dmitry Bejarano M.D.;  Location: SURGERY [facility];  Service:    • CATARACT PHACO WITH IOL  4/21/2015    Performed by Dmitry Bejarano M.D. at [facility] ORS   • RECOVERY  11/30/2010    Performed by SURGERY, [facility]-RECOVERY at SURGERY SAME DAY [facility] ORS   • COLONOSCOPY  2008    Normal    [facility]   • ABDOMINAL HYSTERECTOMY TOTAL  April 15,1975    still has ovaries   • OPEN REDUCTION      left ankle   • OTHER      Removed pins from left ankle   • TONSILLECTOMY AND ADENOIDECTOMY         Allergies   Allergen Reactions   • Amiodarone Hives     Throat and tongue itching   • Bactrim Shortness of Breath   • Claritin-D [Loratadine-Pseudoephedrine] Palpitations   • Clotrimazole      Stinging / burning on chest   • Metoprolol      Causes throat swelling   • Morphine      Hallucinations   • Qvar [Beclomethasone Dipropionate]      Pressure on heart     • Vibramycin Shortness of Breath   • Atorvastatin Calcium-Polysorbate 80      Muscle aches     • Augmentin      Unknown reaction   • Cipro Xr Swelling   • Diltiazem      rash   • Flecainide      dizziness   • Keflex Unspecified     Pt states \"Unsure\".   • Mucinex      GI Distress     • Tramadol      crying   • Tape Rash     Paper tape okay       Current Outpatient Prescriptions   Medication Sig Dispense Refill   • Probiotic Product (PROBIOTIC DAILY PO) Take  by mouth.     • PREBIOTIC PRODUCT PO Take  by mouth.     • furosemide (LASIX) 40 MG Tab Take 2 Tabs by mouth every day. 80 mg in the AM 60 Tab 3   • spironolactone (ALDACTONE) 50 MG Tab Take 1 Tab by mouth every day. Take in the AM 30 Tab 3   • levothyroxine (SYNTHROID) 50 MCG Tab TAKE 1 TABLET BY MOUTH EVERY MORNING ON AN EMPTY STOMACH. 90 Tab 3   • raNITidine (ZANTAC) 150 MG Tab TAKE ONE TABLET BY " MOUTH TWICE DAILY 180 Tab 3   • dofetilide (TIKOSYN) 500 MCG Cap Take 1 Cap by mouth every 12 hours. 60 Cap 6   • lisinopril (PRINIVIL) 5 MG Tab TAKE ONE TABLET BY MOUTH EVERY DAY 90 Tab 3   • estradiol (ESTRACE) 2 MG Tab TAKE ONE TABLET BY MOUTH EVERY DAY 90 Tab 3   • atenolol (TENORMIN) 25 MG Tab Take 1 Tab by mouth 2 times a day. Take extra 1 tab as needed palpitations 90 Tab 3   • magnesium oxide (MAG-OX) 400 (241.3 Mg) MG Tab tablet Take 1 Tab by mouth every day. 30 Tab 6   • warfarin (COUMADIN) 2.5 MG Tab Take 2.5-3.75 mg by mouth every day. 3.75 mg on Monday and Wednesday  2.5 mg all other days     • sertraline (ZOLOFT) 25 MG tablet Take 1 Tab by mouth every day. 90 Tab 3   • digoxin (LANOXIN) 125 MCG Tab Take 1 Tab by mouth every day. 90 Tab 3   • Multiple Vitamins-Minerals (ICAPS AREDS 2) Cap Take 2 Caps by mouth every day. 100 Cap 3   • Diclofenac Sodium 1 % Gel Apply  to affected area(s) 2 times a day as needed (For joint pain).     • acetaminophen (TYLENOL) 500 MG TABS Take 1,000 mg by mouth 3 times a day. Indications: Pain     • sennosides-docusate sodium (SENOKOT-S) 8.6-50 MG tablet Take 3 Tabs by mouth every day. 30 Tab    • vitamin D (CHOLECALCIFEROL) 1000 UNIT TABS Take 2,000 Units by mouth every day.       No current facility-administered medications for this visit.         Family History   Problem Relation Age of Onset   • Stroke Mother    • Diabetes Father    • Stroke Sister    • Heart Disease Brother    • Stroke Sister    • GI Daughter      Crohn's Disease   • Heart Disease Daughter      CHF   • Other Daughter      Chronic Pain--Lymphedema   • Cancer Paternal Aunt        Social History     Social History   • Marital status:      Spouse name: N/A   • Number of children: N/A   • Years of education: N/A     Occupational History   • Not on file.     Social History Main Topics   • Smoking status: Never Smoker   • Smokeless tobacco: Never Used   • Alcohol use No   • Drug use: No   • Sexual  activity: Not Currently     Partners: Male     Birth control/ protection: Post-Menopausal     Other Topics Concern   • Not on file     Social History Narrative   • No narrative on file      Physical Exam   Constitutional: She is oriented. She appears well-developed and well-nourished. No distress.  She walks with a walker because of pain going into her legs from her back.  HENT:  Head: Normocephalic and atraumatic.   Right Ear: External ear normal. Ear canal and TM normal   Left Ear: External ear normal. Ear canal and TM normal  Nose: Nose normal.   Mouth/Throat: Oropharynx is clear and moist.   Eyes: Conjunctivae and extraocular motions are normal. Pupils are equal, round, and reactive to light. Fundi benign bilaterally   Neck: No thyromegaly present.   Cardiovascular: She has a rapid rate of 96/min, irregular rhythm, normal heart sounds and intact distal pulses.  Exam reveals no gallop.    No murmur heard.  Pulmonary/Chest: Effort normal and breath sounds normal. No respiratory distress. She has no wheezes. She has no rales.   Abdominal: Soft. Bowel sounds are normal. No hepatosplenomegaly She exhibits no distension. No tenderness. She has no rebound and no guarding.   Musculoskeletal: Normal range of motion. She exhibits 2+ bilateral edema in her feet and no tenderness.   Lymphadenopathy:     She has no cervical adenopathy.   No supraclavicular adenopathy  Neurological: She is alert and oriented. She has normal reflexes.        Babinskis downgoing bilaterally   Skin: Skin is warm and dry. No rash noted. No erythema.   Psychiatric: She has a normal mood and appropriate affect. Her behavior is normal. Judgment and thought content normal.    1. Primary osteoarthritis of both knees   stable   2. Right-sided low back pain with right-sided sciatica, unspecified chronicity  DX-LUMBAR SPINE-2 OR 3 VIEWS    REFERRAL TO PHYSIATRY (PMR)--[facility]   3. Diarrhea, unspecified type     4. Localized edema   furosemide (LASIX) 40 MG Tab--2 in the morning    spironolactone (ALDACTONE) 50 MG Tab--1 in the morning    ELECTROLYTES--to be done in 1 week   5. Obesity (BMI 30-39.9)  Patient identified as having weight management issue.  Appropriate orders and counseling given.     Please note that this dictation was created using voice recognition software. I have worked with consultants from the vendor as well as technical experts from [facility] [employer] to optimize the interface. I have made every reasonable attempt to correct obvious errors, but I expect that there are errors of grammar and possibly content that I did not discover before finalizing the note.    Recheck as needed

## 2018-06-05 ENCOUNTER — NON-PROVIDER VISIT (OUTPATIENT)
Dept: CARDIOLOGY | Facility: MEDICAL CENTER | Age: 78
End: 2018-06-05
Payer: MEDICARE

## 2018-06-05 ENCOUNTER — ANTICOAGULATION MONITORING (OUTPATIENT)
Dept: VASCULAR LAB | Facility: MEDICAL CENTER | Age: 78
End: 2018-06-05

## 2018-06-05 ENCOUNTER — PATIENT MESSAGE (OUTPATIENT)
Dept: MEDICAL GROUP | Facility: PHYSICIAN GROUP | Age: 78
End: 2018-06-05

## 2018-06-05 ENCOUNTER — HOSPITAL ENCOUNTER (OUTPATIENT)
Dept: LAB | Facility: MEDICAL CENTER | Age: 78
End: 2018-06-05
Attending: FAMILY MEDICINE
Payer: MEDICARE

## 2018-06-05 DIAGNOSIS — R60.0 LOCALIZED EDEMA: ICD-10-CM

## 2018-06-05 DIAGNOSIS — I48.19 PERSISTENT ATRIAL FIBRILLATION (HCC): ICD-10-CM

## 2018-06-05 DIAGNOSIS — Z79.01 CHRONIC ANTICOAGULATION: ICD-10-CM

## 2018-06-05 DIAGNOSIS — R00.1 SINUS BRADYCARDIA: ICD-10-CM

## 2018-06-05 DIAGNOSIS — I49.1 PREMATURE ATRIAL CONTRACTION: ICD-10-CM

## 2018-06-05 LAB
ANION GAP SERPL CALC-SCNC: 13 MMOL/L (ref 0–11.9)
CHLORIDE SERPL-SCNC: 100 MMOL/L (ref 96–112)
CO2 SERPL-SCNC: 25 MMOL/L (ref 20–33)
INR PPP: 2.8 (ref 2–3.5)
POTASSIUM SERPL-SCNC: 4.2 MMOL/L (ref 3.6–5.5)
SODIUM SERPL-SCNC: 138 MMOL/L (ref 135–145)

## 2018-06-05 PROCEDURE — 80051 ELECTROLYTE PANEL: CPT

## 2018-06-05 PROCEDURE — 36415 COLL VENOUS BLD VENIPUNCTURE: CPT

## 2018-06-05 NOTE — PROGRESS NOTES
OP Telephone Anticoagulation Service Note    Date: 6/5/2018      Anticoagulation Summary  As of 6/5/2018    INR goal:   2.0-3.0   TTR:   69.9 % (3 y)   Today's INR:   2.8   Warfarin maintenance plan:   3.75 mg (2.5 mg x 1.5) on Mon; 2.5 mg (2.5 mg x 1) all other days   Weekly warfarin total:   18.75 mg   Plan last modified:   Ivone Barraza PharmD (5/8/2018)   Next INR check:      Target end date:   Indefinite    Indications    Atrial fibrillation (HCC) (Resolved) [I48.91]  Chronic anticoagulation [Z79.01]             Anticoagulation Episode Summary     INR check location:   Home Draw    Preferred lab:       Send INR reminders to:       Comments:   Winston YEAGER      Anticoagulation Care Providers     Provider Role Specialty Phone number    Lizzy Haywood M.D. Referring Cardiology [phone]    [facility] Anticoagulation [facility] Responsible  [phone]    Peter HollowayD Responsible          Anticoagulation Patient Findings        Plan: Therapeutic INR 2.8 on 6/5/2018.     Spoke with patient on the phone. Patient is therapeutic today. Confirmed dosing. No missed tablets in the last week. Patient denies any changes in medications or diet, except patient states she was started on Spironolactone and stopped her Potassium Chloride and that she has a rash on her legs. Patient had lab work done and is following up with Dr Price today about her legs and new medication. Patient denies any signs or symptoms of bleeding or clotting. Instructed patient to call clinic if any unusual bleeding or bruising occurs. Will continue dosing as outlined. Will follow-up with patient in 2 week(s), June 19, 2018.       Fatou Lozano, Pharmacy Intern      I have reviewed and agree with the plan above on 06/05/2018      Massiel Coleman, PeterD

## 2018-06-08 LAB — EKG IMPRESSION: NORMAL

## 2018-06-08 PROCEDURE — 93224 XTRNL ECG REC UP TO 48 HRS: CPT | Performed by: INTERNAL MEDICINE

## 2018-06-11 DIAGNOSIS — M48.00 SPINAL STENOSIS, MULTILEVEL: ICD-10-CM

## 2018-06-11 NOTE — PROGRESS NOTES
"Cardiology/Electrophysiology Follow-up Note      Subjective:   Chief Complaint:   Chief Complaint   Patient presents with   • Atrial Fibrillation       Donte Magaña is a 77 y.o. female who presents today for follow up atrial fibrillation and high risk medication usage in the form of Tikosyn.    She is followed by Dr. Matthews and Lizzy Haywood.  Medical history also significant for CAD, Hyperlipidemia, chronic anticoagulation use, hypothyroidism.    Today in follow up she states that she does believe that her Tikosyn is causing her diarrhea and additionally has not controled her atrial fibrillation very well.  She recently worse a Holter monitor to evaluate this, and overall looked ok without evidence of Afib.  Patient states Afib returned after Holter monitor was taken off.    She monitors her blood pressure and heart rate throughout the day.  No log to review today, however, she states that heart rate 50s-80s most of the time.  When she does have Afib, her rates do go above 100, but states that they generally do not sustain.  She has no chest pain, dyspnea, or dizziness with her Afib, just feels extremely fatigued.  States lower extremity edema, previously worsening, was started on Aldactone by Dr. Price, now somewhat improved.     Patient endorses medication compliance      Past Medical History:   Diagnosis Date   • A-fib (HCC)    • Anesthesia     \"Tachycardia for 5 days after cataract surgery\"   • Anticoagulant long-term use 1/12/2012   • Arthritis     Knees, hips   • Asthma     with pneumonia, nothing for 3 years   • Atrial fibrillation (HCC)    • Backpain    • Breath shortness     with exertion O2  2l/m PRN, hasnt used for 3 years   • Bronchitis Nov, 2013   • CAD (coronary artery disease)    • Depression    • Glaucoma 5/3/2011   • Hematoma complicating a procedure 11/3/2012   • High cholesterol    • Hypertension    • Hypothyroid    • Lupus    • Macular degeneration    • Menopause 1/12/2012   • Mitral " regurgitation 10/30/2012   • Obesity 1/12/2012   • Pneumonia feb,2013   • Pulmonary hypertension (HCC) 10/30/2012   • PVC's (premature ventricular contractions) 1/12/2012   • Senile nuclear sclerosis    • Spinal stenosis of lumbar region at multiple levels    • Unspecified cataract     repaired bilateral   • Unspecified urinary incontinence      Past Surgical History:   Procedure Laterality Date   • KNEE ARTHROPLASTY TOTAL Right 6/23/2016    Procedure: KNEE ARTHROPLASTY TOTAL;  Surgeon: Heriberto Lozada M.D.;  Location: SURGERY [facility];  Service:    • KNEE ARTHROPLASTY TOTAL Left 5/28/2015    Procedure: KNEE ARTHROPLASTY TOTAL;  Surgeon: Heriberto Lozada M.D.;  Location: SURGERY [facility];  Service:    • CATARACT PHACO WITH IOL Right 5/5/2015    Procedure: IOL OD - STANDARD;  Surgeon: Dmitry Bejarano M.D.;  Location: [facility];  Service:    • CATARACT PHACO WITH IOL  4/21/2015    Performed by Dmitry Bejarano M.D. at [facility]   • RECOVERY  11/30/2010    Performed by SURGERY, [facility]-RECOVERY at SURGERY SAME DAY [facility] ORS   • COLONOSCOPY  2008    Normal    [facility]   • ABDOMINAL HYSTERECTOMY TOTAL  April 15,1975    still has ovaries   • OPEN REDUCTION      left ankle   • OTHER      Removed pins from left ankle   • TONSILLECTOMY AND ADENOIDECTOMY       Family History   Problem Relation Age of Onset   • Stroke Mother    • Diabetes Father    • Stroke Sister    • Heart Disease Brother    • Stroke Sister    • GI Daughter      Crohn's Disease   • Heart Disease Daughter      CHF   • Other Daughter      Chronic Pain--Lymphedema   • Cancer Paternal Aunt      Social History     Social History   • Marital status:      Spouse name: N/A   • Number of children: N/A   • Years of education: N/A     Occupational History   • Not on file.     Social History Main Topics   • Smoking status: Never Smoker   • Smokeless tobacco: Never Used   • Alcohol use No   • Drug use: No   •  "Sexual activity: Not Currently     Partners: Male     Birth control/ protection: Post-Menopausal     Other Topics Concern   • Not on file     Social History Narrative   • No narrative on file     Allergies   Allergen Reactions   • Amiodarone Hives     Throat and tongue itching   • Bactrim Shortness of Breath   • Claritin-D [Loratadine-Pseudoephedrine] Palpitations   • Clotrimazole      Stinging / burning on chest   • Metoprolol      Causes throat swelling   • Morphine      Hallucinations   • Qvar [Beclomethasone Dipropionate]      Pressure on heart     • Vibramycin Shortness of Breath   • Atorvastatin Calcium-Polysorbate 80      Muscle aches     • Augmentin      Unknown reaction   • Cipro Xr Swelling   • Diltiazem      rash   • Flecainide      dizziness   • Keflex Unspecified     Pt states \"Unsure\".   • Mucinex      GI Distress     • Tramadol      crying   • Tape Rash     Paper tape okay       Current Outpatient Prescriptions   Medication Sig Dispense Refill   • Probiotic Product (PROBIOTIC DAILY PO) Take  by mouth.     • PREBIOTIC PRODUCT PO Take  by mouth.     • furosemide (LASIX) 40 MG Tab Take 2 Tabs by mouth every day. 80 mg in the AM 60 Tab 3   • spironolactone (ALDACTONE) 50 MG Tab Take 1 Tab by mouth every day. Take in the AM 30 Tab 3   • levothyroxine (SYNTHROID) 50 MCG Tab TAKE 1 TABLET BY MOUTH EVERY MORNING ON AN EMPTY STOMACH. 90 Tab 3   • raNITidine (ZANTAC) 150 MG Tab TAKE ONE TABLET BY MOUTH TWICE DAILY 180 Tab 3   • dofetilide (TIKOSYN) 500 MCG Cap Take 1 Cap by mouth every 12 hours. 60 Cap 6   • lisinopril (PRINIVIL) 5 MG Tab TAKE ONE TABLET BY MOUTH EVERY DAY 90 Tab 3   • estradiol (ESTRACE) 2 MG Tab TAKE ONE TABLET BY MOUTH EVERY DAY 90 Tab 3   • atenolol (TENORMIN) 25 MG Tab Take 1 Tab by mouth 2 times a day. Take extra 1 tab as needed palpitations 90 Tab 3   • magnesium oxide (MAG-OX) 400 (241.3 Mg) MG Tab tablet Take 1 Tab by mouth every day. 30 Tab 6   • warfarin (COUMADIN) 2.5 MG Tab Take " "2.5-3.75 mg by mouth every day. 3.75 mg on Monday and Wednesday  2.5 mg all other days     • sertraline (ZOLOFT) 25 MG tablet Take 1 Tab by mouth every day. 90 Tab 3   • digoxin (LANOXIN) 125 MCG Tab Take 1 Tab by mouth every day. 90 Tab 3   • Multiple Vitamins-Minerals (ICAPS AREDS 2) Cap Take 2 Caps by mouth every day. 100 Cap 3   • Diclofenac Sodium 1 % Gel Apply  to affected area(s) 2 times a day as needed (For joint pain).     • acetaminophen (TYLENOL) 500 MG TABS Take 1,000 mg by mouth 3 times a day. Indications: Pain     • sennosides-docusate sodium (SENOKOT-S) 8.6-50 MG tablet Take 3 Tabs by mouth every day. 30 Tab    • vitamin D (CHOLECALCIFEROL) 1000 UNIT TABS Take 2,000 Units by mouth every day.       No current facility-administered medications for this visit.        Review of Systems   Constitutional: Positive for malaise/fatigue. Negative for chills, fever and weight loss.   HENT: Negative for congestion, sore throat and tinnitus.    Eyes: Negative for blurred vision and double vision.   Respiratory: Negative for cough, hemoptysis, sputum production, shortness of breath, wheezing and stridor.    Cardiovascular: Negative for chest pain, palpitations, orthopnea, leg swelling and PND.   Gastrointestinal: Positive for diarrhea. Negative for abdominal pain, blood in stool, heartburn, melena, nausea and vomiting.   Genitourinary: Negative for hematuria.   Skin: Negative for rash.   Neurological: Negative for dizziness, tingling, tremors, sensory change, speech change, focal weakness, loss of consciousness, weakness and headaches.     All others systems reviewed and negative.     Objective:     Blood pressure (!) 164/90, pulse (!) 142, resp. rate 16, height 1.626 m (5' 4\"), weight 79.8 kg (176 lb), last menstrual period 01/01/1993, SpO2 97 %. Body mass index is 30.21 kg/m².    Physical Exam   Constitutional: She is oriented to person, place, and time and well-developed, well-nourished, and in no distress. "   HENT:   Head: Normocephalic and atraumatic.   Eyes: Conjunctivae and EOM are normal. Pupils are equal, round, and reactive to light.   Neck: Normal range of motion. Neck supple. No JVD present.   Cardiovascular: Normal heart sounds and intact distal pulses.  An irregularly irregular rhythm present. Tachycardia present.  Exam reveals no gallop and no friction rub.    No murmur heard.  Pulses:       Radial pulses are 2+ on the right side, and 2+ on the left side.        Dorsalis pedis pulses are 2+ on the right side, and 2+ on the left side.        Posterior tibial pulses are 2+ on the right side, and 2+ on the left side.   Pulmonary/Chest: Effort normal and breath sounds normal. No respiratory distress. She has no wheezes. She has no rales. She exhibits no tenderness.   Abdominal: Soft. Bowel sounds are normal. There is no tenderness.   Musculoskeletal: Normal range of motion. She exhibits edema (1-2+ edema to BLE.).   Neurological: She is alert and oriented to person, place, and time.   Skin: Skin is warm and dry. No rash noted. No erythema.   Psychiatric: Mood, memory, affect and judgment normal.     Cardiac Imaging and Procedures Review:    EKG dated 6/12/18:   Afib, ventricular rate 138.     Echo dated 6/2/17:   Sinus rhythm.  Left ventricular ejection fraction is visually estimated to be 60%.  Normal left atrial size.  No significant valve disease or flow abnormalities.     Right ventricular systolic pressure is estimated to be 35 mmHg.  No prior study is available for comparison.     Event Monitor Review 6/5/18:  Read by Dr. Matthews.  No Afib, otherwise looked ok.     Labs (personally reviewed and notable for):   Lab Results   Component Value Date/Time    SODIUM 138 06/05/2018 11:25 AM    POTASSIUM 4.2 06/05/2018 11:25 AM    CHLORIDE 100 06/05/2018 11:25 AM    CO2 25 06/05/2018 11:25 AM    GLUCOSE 181 (H) 03/06/2018 02:23 PM    BUN 18 03/06/2018 02:23 PM    CREATININE 0.83 03/06/2018 02:23 PM      Lab Results    Component Value Date/Time    WBC 11.1 (H) 03/06/2018 02:23 PM    RBC 4.64 03/06/2018 02:23 PM    HEMOGLOBIN 14.2 03/06/2018 02:23 PM    HEMATOCRIT 44.8 03/06/2018 02:23 PM    MCV 96.6 03/06/2018 02:23 PM    MCH 30.6 03/06/2018 02:23 PM    MCHC 31.7 (L) 03/06/2018 02:23 PM    MPV 10.6 03/06/2018 02:23 PM    NEUTSPOLYS 63.80 03/06/2018 02:23 PM    LYMPHOCYTES 25.90 03/06/2018 02:23 PM    MONOCYTES 7.10 03/06/2018 02:23 PM    EOSINOPHILS 2.00 03/06/2018 02:23 PM    BASOPHILS 0.60 03/06/2018 02:23 PM      PT/INR:   Lab Results   Component Value Date/Time    PROTHROMBTM 23.0 (H) 12/22/2017 01:57 AM    INR 2.8 06/05/2018       Assessment:     1. Paroxysmal atrial fibrillation (HCC)  EKG   2. Visit for monitoring Tikosyn therapy     3. Coronary artery disease involving native coronary artery of native heart without angina pectoris     4. Diarrhea, unspecified type         Medical Decision Making:  Today's Assessment / Status / Plan:   1. Afib:  - IN Afib during office visit.  Ventricular rate 138.  Per her report she has been of normal rate throughout the day, but increased when coming to her appointment.   She relatively asymptomatic, except for fatigue.    - She had a recent Holter with no Afib.  - She will take an additional Atenolol when she gets home.    - Will get repeat EKG in office tomorrow.  - Advised if remains with RVR should go to the ED for evaluation.      2. High Risk Medication Use:  - QTc interval: 470-490 msec with RVR.     - Previous QTc interval 440 msec.  - Repeat EKG tomorrow.     3. CAD:  - No Chest pain or angina symptoms.    4. Anticoagulation:  - Denies any s/s of external bleeding.     5. Diarrhea:  - May be uncommon reaction to Tikosyn.  I discussed with her that this is rarely seen but she does believe this is the cause.      We discussed coming off Tikosyn but would like to discuss plan with Dr. Matthews in regards to Possible AVN ablation with PPM vs consideration for repeat ablation before  Tikosyn is stopped.     Plan reviewed in detail with the patient and she verbalizes understanding and is in agreement.   RTC in 3 weeks, sooner if clinical condition changes.   Collaborating MD/ADD: Angelika.    ADDENDUM 6/20/18:  Reviewed EKG and plan with Dr. Matthews.  Dr. Matthews thinks EKG consistent with Atrial Flutter.  Reviewed control of arrhythmia and possible S/E to Tikosyn.  Per Dr. Matthews, ok to stop Tikosyn.  If continue to have arrhythmia would recommend atrial flutter ablation.  I called the patient and reviewed this with her.  She will stop Tikosyn and keep up updated with her heart rates.  Keep follow up in 2 weeks.     TIMUR Winslow.

## 2018-06-12 ENCOUNTER — TELEPHONE (OUTPATIENT)
Dept: CARDIOLOGY | Facility: MEDICAL CENTER | Age: 78
End: 2018-06-12

## 2018-06-12 ENCOUNTER — OFFICE VISIT (OUTPATIENT)
Dept: CARDIOLOGY | Facility: MEDICAL CENTER | Age: 78
End: 2018-06-12
Payer: MEDICARE

## 2018-06-12 VITALS
BODY MASS INDEX: 30.05 KG/M2 | HEART RATE: 142 BPM | RESPIRATION RATE: 16 BRPM | OXYGEN SATURATION: 97 % | HEIGHT: 64 IN | SYSTOLIC BLOOD PRESSURE: 164 MMHG | WEIGHT: 176 LBS | DIASTOLIC BLOOD PRESSURE: 90 MMHG

## 2018-06-12 DIAGNOSIS — Z79.899 VISIT FOR MONITORING TIKOSYN THERAPY: ICD-10-CM

## 2018-06-12 DIAGNOSIS — R19.7 DIARRHEA, UNSPECIFIED TYPE: ICD-10-CM

## 2018-06-12 DIAGNOSIS — Z79.01 CHRONIC ANTICOAGULATION: ICD-10-CM

## 2018-06-12 DIAGNOSIS — I25.10 CORONARY ARTERY DISEASE INVOLVING NATIVE CORONARY ARTERY OF NATIVE HEART WITHOUT ANGINA PECTORIS: ICD-10-CM

## 2018-06-12 DIAGNOSIS — R32 URINARY INCONTINENCE, UNSPECIFIED TYPE: ICD-10-CM

## 2018-06-12 DIAGNOSIS — Z51.81 VISIT FOR MONITORING TIKOSYN THERAPY: ICD-10-CM

## 2018-06-12 DIAGNOSIS — I48.0 PAROXYSMAL ATRIAL FIBRILLATION (HCC): ICD-10-CM

## 2018-06-12 LAB — EKG IMPRESSION: NORMAL

## 2018-06-12 PROCEDURE — 99214 OFFICE O/P EST MOD 30 MIN: CPT | Performed by: NURSE PRACTITIONER

## 2018-06-12 PROCEDURE — 93000 ELECTROCARDIOGRAM COMPLETE: CPT | Performed by: NURSE PRACTITIONER

## 2018-06-12 ASSESSMENT — ENCOUNTER SYMPTOMS
FOCAL WEAKNESS: 0
WHEEZING: 0
TINGLING: 0
BLURRED VISION: 0
LOSS OF CONSCIOUSNESS: 0
PALPITATIONS: 0
WEIGHT LOSS: 0
SHORTNESS OF BREATH: 0
FEVER: 0
ABDOMINAL PAIN: 0
DIARRHEA: 1
SORE THROAT: 0
SPEECH CHANGE: 0
SENSORY CHANGE: 0
ORTHOPNEA: 0
NAUSEA: 0
DIZZINESS: 0
VOMITING: 0
BLOOD IN STOOL: 0
HEMOPTYSIS: 0
STRIDOR: 0
WEAKNESS: 0
DOUBLE VISION: 0
TREMORS: 0
SPUTUM PRODUCTION: 0
PND: 0
HEADACHES: 0
COUGH: 0
CHILLS: 0
HEARTBURN: 0

## 2018-06-12 NOTE — LETTER
"     [facility]   [address], [address]  [place] [address]  Phone: [phone]  Fax: [phone]              Donte Magaña  1940    Encounter Date: 6/12/2018    VICTORIANO Winslow          PROGRESS NOTE:  Cardiology/Electrophysiology Follow-up Note      Subjective:   Chief Complaint:   Chief Complaint   Patient presents with   • Atrial Fibrillation       Donte Magaña is a 77 y.o. female who presents today for follow up atrial fibrillation and high risk medication usage in the form of Tikosyn.    She is followed by Dr. Matthews and Lizzy Haywood.  Medical history also significant for CAD, Hyperlipidemia, chronic anticoagulation use, hypothyroidism.    Today in follow up she states that she does believe that her Tikosyn is causing her diarrhea and additionally has not controled her atrial fibrillation very well.  She recently worse a Holter monitor to evaluate this, and overall looked ok without evidence of Afib.  Patient states Afib returned after Holter monitor was taken off.    She monitors her blood pressure and heart rate throughout the day.  No log to review today, however, she states that heart rate 50s-80s most of the time.  When she does have Afib, her rates do go above 100, but states that they generally do not sustain.  She has no chest pain, dyspnea, or dizziness with her Afib, just feels extremely fatigued.  States lower extremity edema, previously worsening, was started on Aldactone by Dr. Price, now somewhat improved.     Patient endorses medication compliance      Past Medical History:   Diagnosis Date   • A-fib (HCC)    • Anesthesia     \"Tachycardia for 5 days after cataract surgery\"   • Anticoagulant long-term use 1/12/2012   • Arthritis     Knees, hips   • Asthma     with pneumonia, nothing for 3 years   • Atrial fibrillation (HCC)    • Backpain    • Breath shortness     with exertion O2  2l/m PRN, hasnt used for 3 years   • " Bronchitis Nov, 2013   • CAD (coronary artery disease)    • Depression    • Glaucoma 5/3/2011   • Hematoma complicating a procedure 11/3/2012   • High cholesterol    • Hypertension    • Hypothyroid    • Lupus    • Macular degeneration    • Menopause 1/12/2012   • Mitral regurgitation 10/30/2012   • Obesity 1/12/2012   • Pneumonia feb,2013   • Pulmonary hypertension (HCC) 10/30/2012   • PVC's (premature ventricular contractions) 1/12/2012   • Senile nuclear sclerosis    • Spinal stenosis of lumbar region at multiple levels    • Unspecified cataract     repaired bilateral   • Unspecified urinary incontinence      Past Surgical History:   Procedure Laterality Date   • KNEE ARTHROPLASTY TOTAL Right 6/23/2016    Procedure: KNEE ARTHROPLASTY TOTAL;  Surgeon: Heriberto Lozada M.D.;  Location: SURGERY [facility];  Service:    • KNEE ARTHROPLASTY TOTAL Left 5/28/2015    Procedure: KNEE ARTHROPLASTY TOTAL;  Surgeon: Heriberto Lozada M.D.;  Location: SURGERY [facility];  Service:    • CATARACT PHACO WITH IOL Right 5/5/2015    Procedure: IOL OD - STANDARD;  Surgeon: Dmitry Bejarano M.D.;  Location: SURGERY [facility];  Service:    • CATARACT PHACO WITH IOL  4/21/2015    Performed by Dmitry Bejarano M.D. at [facility]   • RECOVERY  11/30/2010    Performed by SURGERY, CATH-RECOVERY at [facility] SAME DAY [facility]   • COLONOSCOPY  2008    Normal    [facility]   • ABDOMINAL HYSTERECTOMY TOTAL  April 15,1975    still has ovaries   • OPEN REDUCTION      left ankle   • OTHER      Removed pins from left ankle   • TONSILLECTOMY AND ADENOIDECTOMY       Family History   Problem Relation Age of Onset   • Stroke Mother    • Diabetes Father    • Stroke Sister    • Heart Disease Brother    • Stroke Sister    • GI Daughter      Crohn's Disease   • Heart Disease Daughter      CHF   • Other Daughter      Chronic Pain--Lymphedema   • Cancer Paternal Aunt      Social History     Social History   • Marital  "status:      Spouse name: N/A   • Number of children: N/A   • Years of education: N/A     Occupational History   • Not on file.     Social History Main Topics   • Smoking status: Never Smoker   • Smokeless tobacco: Never Used   • Alcohol use No   • Drug use: No   • Sexual activity: Not Currently     Partners: Male     Birth control/ protection: Post-Menopausal     Other Topics Concern   • Not on file     Social History Narrative   • No narrative on file     Allergies   Allergen Reactions   • Amiodarone Hives     Throat and tongue itching   • Bactrim Shortness of Breath   • Claritin-D [Loratadine-Pseudoephedrine] Palpitations   • Clotrimazole      Stinging / burning on chest   • Metoprolol      Causes throat swelling   • Morphine      Hallucinations   • Qvar [Beclomethasone Dipropionate]      Pressure on heart     • Vibramycin Shortness of Breath   • Atorvastatin Calcium-Polysorbate 80      Muscle aches     • Augmentin      Unknown reaction   • Cipro Xr Swelling   • Diltiazem      rash   • Flecainide      dizziness   • Keflex Unspecified     Pt states \"Unsure\".   • Mucinex      GI Distress     • Tramadol      crying   • Tape Rash     Paper tape okay       Current Outpatient Prescriptions   Medication Sig Dispense Refill   • Probiotic Product (PROBIOTIC DAILY PO) Take  by mouth.     • PREBIOTIC PRODUCT PO Take  by mouth.     • furosemide (LASIX) 40 MG Tab Take 2 Tabs by mouth every day. 80 mg in the AM 60 Tab 3   • spironolactone (ALDACTONE) 50 MG Tab Take 1 Tab by mouth every day. Take in the AM 30 Tab 3   • levothyroxine (SYNTHROID) 50 MCG Tab TAKE 1 TABLET BY MOUTH EVERY MORNING ON AN EMPTY STOMACH. 90 Tab 3   • raNITidine (ZANTAC) 150 MG Tab TAKE ONE TABLET BY MOUTH TWICE DAILY 180 Tab 3   • dofetilide (TIKOSYN) 500 MCG Cap Take 1 Cap by mouth every 12 hours. 60 Cap 6   • lisinopril (PRINIVIL) 5 MG Tab TAKE ONE TABLET BY MOUTH EVERY DAY 90 Tab 3   • estradiol (ESTRACE) 2 MG Tab TAKE ONE TABLET BY MOUTH " EVERY DAY 90 Tab 3   • atenolol (TENORMIN) 25 MG Tab Take 1 Tab by mouth 2 times a day. Take extra 1 tab as needed palpitations 90 Tab 3   • magnesium oxide (MAG-OX) 400 (241.3 Mg) MG Tab tablet Take 1 Tab by mouth every day. 30 Tab 6   • warfarin (COUMADIN) 2.5 MG Tab Take 2.5-3.75 mg by mouth every day. 3.75 mg on Monday and Wednesday  2.5 mg all other days     • sertraline (ZOLOFT) 25 MG tablet Take 1 Tab by mouth every day. 90 Tab 3   • digoxin (LANOXIN) 125 MCG Tab Take 1 Tab by mouth every day. 90 Tab 3   • Multiple Vitamins-Minerals (ICAPS AREDS 2) Cap Take 2 Caps by mouth every day. 100 Cap 3   • Diclofenac Sodium 1 % Gel Apply  to affected area(s) 2 times a day as needed (For joint pain).     • acetaminophen (TYLENOL) 500 MG TABS Take 1,000 mg by mouth 3 times a day. Indications: Pain     • sennosides-docusate sodium (SENOKOT-S) 8.6-50 MG tablet Take 3 Tabs by mouth every day. 30 Tab    • vitamin D (CHOLECALCIFEROL) 1000 UNIT TABS Take 2,000 Units by mouth every day.       No current facility-administered medications for this visit.        Review of Systems   Constitutional: Positive for malaise/fatigue. Negative for chills, fever and weight loss.   HENT: Negative for congestion, sore throat and tinnitus.    Eyes: Negative for blurred vision and double vision.   Respiratory: Negative for cough, hemoptysis, sputum production, shortness of breath, wheezing and stridor.    Cardiovascular: Negative for chest pain, palpitations, orthopnea, leg swelling and PND.   Gastrointestinal: Positive for diarrhea. Negative for abdominal pain, blood in stool, heartburn, melena, nausea and vomiting.   Genitourinary: Negative for hematuria.   Skin: Negative for rash.   Neurological: Negative for dizziness, tingling, tremors, sensory change, speech change, focal weakness, loss of consciousness, weakness and headaches.     All others systems reviewed and negative.     Objective:     Blood pressure (!) 164/90, pulse (!) 142,  "resp. rate 16, height 1.626 m (5' 4\"), weight 79.8 kg (176 lb), last menstrual period 01/01/1993, SpO2 97 %. Body mass index is 30.21 kg/m².    Physical Exam   Constitutional: She is oriented to person, place, and time and well-developed, well-nourished, and in no distress.   HENT:   Head: Normocephalic and atraumatic.   Eyes: Conjunctivae and EOM are normal. Pupils are equal, round, and reactive to light.   Neck: Normal range of motion. Neck supple. No JVD present.   Cardiovascular: Normal heart sounds and intact distal pulses.  An irregularly irregular rhythm present. Tachycardia present.  Exam reveals no gallop and no friction rub.    No murmur heard.  Pulses:       Radial pulses are 2+ on the right side, and 2+ on the left side.        Dorsalis pedis pulses are 2+ on the right side, and 2+ on the left side.        Posterior tibial pulses are 2+ on the right side, and 2+ on the left side.   Pulmonary/Chest: Effort normal and breath sounds normal. No respiratory distress. She has no wheezes. She has no rales. She exhibits no tenderness.   Abdominal: Soft. Bowel sounds are normal. There is no tenderness.   Musculoskeletal: Normal range of motion. She exhibits edema (1-2+ edema to BLE.).   Neurological: She is alert and oriented to person, place, and time.   Skin: Skin is warm and dry. No rash noted. No erythema.   Psychiatric: Mood, memory, affect and judgment normal.     Cardiac Imaging and Procedures Review:    EKG dated 6/12/18:   Afib, ventricular rate 138.     Echo dated 6/2/17:   Sinus rhythm.  Left ventricular ejection fraction is visually estimated to be 60%.  Normal left atrial size.  No significant valve disease or flow abnormalities.     Right ventricular systolic pressure is estimated to be 35 mmHg.  No prior study is available for comparison.     Event Monitor Review 6/5/18:  Read by Dr. Matthews.  No Afib, otherwise looked ok.     Labs (personally reviewed and notable for):   Lab Results   Component " Value Date/Time    SODIUM 138 06/05/2018 11:25 AM    POTASSIUM 4.2 06/05/2018 11:25 AM    CHLORIDE 100 06/05/2018 11:25 AM    CO2 25 06/05/2018 11:25 AM    GLUCOSE 181 (H) 03/06/2018 02:23 PM    BUN 18 03/06/2018 02:23 PM    CREATININE 0.83 03/06/2018 02:23 PM      Lab Results   Component Value Date/Time    WBC 11.1 (H) 03/06/2018 02:23 PM    RBC 4.64 03/06/2018 02:23 PM    HEMOGLOBIN 14.2 03/06/2018 02:23 PM    HEMATOCRIT 44.8 03/06/2018 02:23 PM    MCV 96.6 03/06/2018 02:23 PM    MCH 30.6 03/06/2018 02:23 PM    MCHC 31.7 (L) 03/06/2018 02:23 PM    MPV 10.6 03/06/2018 02:23 PM    NEUTSPOLYS 63.80 03/06/2018 02:23 PM    LYMPHOCYTES 25.90 03/06/2018 02:23 PM    MONOCYTES 7.10 03/06/2018 02:23 PM    EOSINOPHILS 2.00 03/06/2018 02:23 PM    BASOPHILS 0.60 03/06/2018 02:23 PM      PT/INR:   Lab Results   Component Value Date/Time    PROTHROMBTM 23.0 (H) 12/22/2017 01:57 AM    INR 2.8 06/05/2018       Assessment:     1. Paroxysmal atrial fibrillation (HCC)  EKG   2. Visit for monitoring Tikosyn therapy     3. Coronary artery disease involving native coronary artery of native heart without angina pectoris     4. Diarrhea, unspecified type         Medical Decision Making:  Today's Assessment / Status / Plan:   1. Afib:  - IN Afib during office visit.  Ventricular rate 138.  Per her report she has been of normal rate throughout the day, but increased when coming to her appointment.   She relatively asymptomatic, except for fatigue.    - She had a recent Holter with no Afib.  - She will take an additional Atenolol when she gets home.    - Will get repeat EKG in office tomorrow.  - Advised if remains with RVR should go to the ED for evaluation.      2. High Risk Medication Use:  - QTc interval: 470-490 msec with RVR.     - Previous QTc interval 440 msec.  - Repeat EKG tomorrow.     3. CAD:  - No Chest pain or angina symptoms.    4. Anticoagulation:  - Denies any s/s of external bleeding.     5. Diarrhea:  - May be uncommon  reaction to Tikosyn.  I discussed with her that this is rarely seen but she does believe this is the cause.      We discussed coming off Tikosyn but would like to discuss plan with Dr. Matthews in regards to Possible AVN ablation with PPM vs consideration for repeat ablation before Tikosyn is stopped.     Plan reviewed in detail with the patient and she verbalizes understanding and is in agreement.   RTC in 3 weeks, sooner if clinical condition changes.   Collaborating MD/ADD: Angelika.    VICTORIANO Winslow M.D.  [address] [place] [address]  VIA In Basket

## 2018-06-13 NOTE — TELEPHONE ENCOUNTER
At least when her heart rate is slower.  Would like to evaluate her QT interval with a slower rate if not sinus.      How have her rates been the last 24 hours?

## 2018-06-13 NOTE — TELEPHONE ENCOUNTER
Per EA do ekg either way tomorrow to see if rates sustained or not. Pt concerned about $50 copay. Checked and each ekg copay is $50. EA aware, will try to get only 1 more ekg for now.

## 2018-06-13 NOTE — TELEPHONE ENCOUNTER
Rate 117-140, still running msotly high with her afib.  has janet appt with Jason tomorrow 120. Scheduled ekg at his appt time in case pulse is better. To EA

## 2018-06-13 NOTE — TELEPHONE ENCOUNTER
Pt called to state she is still in afib, per Perla, pt is to come in when in normal rhythm for ekg. Advised pt she is to call us when she is in normal rhtyhm and let us know when she is coming in for ekg.

## 2018-06-19 ENCOUNTER — ANTICOAGULATION VISIT (OUTPATIENT)
Dept: VASCULAR LAB | Facility: MEDICAL CENTER | Age: 78
End: 2018-06-19
Attending: INTERNAL MEDICINE
Payer: MEDICARE

## 2018-06-19 DIAGNOSIS — Z79.01 CHRONIC ANTICOAGULATION: ICD-10-CM

## 2018-06-19 LAB — INR PPP: 2.9 (ref 2–3.5)

## 2018-06-19 PROCEDURE — 85610 PROTHROMBIN TIME: CPT

## 2018-06-19 PROCEDURE — 99211 OFF/OP EST MAY X REQ PHY/QHP: CPT

## 2018-06-19 NOTE — PROGRESS NOTES
Anticoagulation Summary  As of 6/19/2018    INR goal:   2.0-3.0   TTR:   70.3 % (3 y)   Today's INR:   2.9   Warfarin maintenance plan:   3.75 mg (2.5 mg x 1.5) on Mon; 2.5 mg (2.5 mg x 1) all other days   Weekly warfarin total:   18.75 mg   Plan last modified:   Ivone Barraza PharmD (5/8/2018)   Next INR check:   7/3/2018   Target end date:   Indefinite    Indications    Atrial fibrillation (HCC) (Resolved) [I48.91]  Chronic anticoagulation [Z79.01]             Anticoagulation Episode Summary     INR check location:   Home Draw    Preferred lab:       Send INR reminders to:       Comments:   [place]       Anticoagulation Care Providers     Provider Role Specialty Phone number    Lizzy Haywood M.D. Referring Cardiology [phone]    [facility] Responsible  [phone]    Peter HollowayD Responsible          Anticoagulation Patient Findings        INR  therapeutic.   Denies signs/symptoms of bleeding and/or thrombosis.   Denies changes to diet or medications.   Follow up appointment in 2 week(s).    Continue weekly warfarin dose as noted      Fran Leyva, PharmD

## 2018-06-21 ENCOUNTER — TELEPHONE (OUTPATIENT)
Dept: CARDIOLOGY | Facility: MEDICAL CENTER | Age: 78
End: 2018-06-21

## 2018-06-21 NOTE — TELEPHONE ENCOUNTER
Spoke to pt. Advised as per Anabel she may need to take a third dose of atenolol for a few days due to her heart rate >100. Pt states she did stop her Tikosyn as of last night. She will send additional BP/HR info tomorrow.

## 2018-06-21 NOTE — TELEPHONE ENCOUNTER
Donte Magaña   to VICTORIANO Winslow     6/21/18 11:28 AM   Good morning Anabel,   Here are my readings for yesterday & today.     B/P            Pulse        Oxygen        Pulse          Date & Time   106/83       103               98              101-138      06/20   7:30 P.M.   110/95        121              98              103-112      06/21   10:30 A.M.     I'll give you an update tomorrow.   Donte   =======================================  To Anabel--TREVOR

## 2018-06-21 NOTE — TELEPHONE ENCOUNTER
If Pulse rate remains >100 needs to take an extra dose of Atenolol.  As I discussed with her yesterday, may be a good idea to take a third dose for a few days.  Did she stop the Tikosyn last night?

## 2018-06-25 ENCOUNTER — TELEPHONE (OUTPATIENT)
Dept: CARDIOLOGY | Facility: MEDICAL CENTER | Age: 78
End: 2018-06-25

## 2018-06-25 DIAGNOSIS — I48.19 PERSISTENT ATRIAL FIBRILLATION (HCC): ICD-10-CM

## 2018-06-25 RX ORDER — ATENOLOL 25 MG/1
25 TABLET ORAL 2 TIMES DAILY
Qty: 270 TAB | Refills: 2 | Status: ON HOLD | OUTPATIENT
Start: 2018-06-25 | End: 2018-07-01

## 2018-06-25 NOTE — TELEPHONE ENCOUNTER
----- Message -----  From: Donte Magaña  Sent: 6/23/2018   3:28 PM  To: Gita Roldan  Subject: Non-Urgent Medical Question                      Good afternoon Dr. Haywood,  Dr. Matthews said I could stop the Tikosyn.   So now I am taking 3 atenolol (25 mg) every day.  [facility]   will contact you for a refill, but you will need to change the number of pills per day from 2 to 3.  Thank you.  I hope you are enjoying your weekend.  Donte    =======================================================    New Rx for Atenolol sent to [facility] per pt request    Pt notified through [employer]

## 2018-06-26 ENCOUNTER — TELEPHONE (OUTPATIENT)
Dept: CARDIOLOGY | Facility: MEDICAL CENTER | Age: 78
End: 2018-06-26

## 2018-06-26 NOTE — TELEPHONE ENCOUNTER
Donte Alicea Gómez   You 1 hour ago (7:19 AM)         Jinny,  Tell Dr. Lees that I never get dizzy.  I'll list my reading for today later.  Thanks, Donte Guardado   Donte Alicea Gómez 16 hours ago (4:31 PM)         Do you get dizzy or have any symptoms with the low blood pressures?          Bettye Narayan, Med Ass't routed conversation to You 20 hours ago (12:08 PM)      Donte Alicea Gómez Lees, SUSI.P.RLeslyeNLeslye 20 hours ago (11:55 AM)         Dr. Lees,   Here are my readings for the weekend & today.   Date & Time                           B/P            Pulse         Oxygen            Pulse     06/21/18  10:30 A.M.           110/95      121             98                      103-112   06/22/18   07:00 A.M.            96/56          87             98                        86-96   06/23/18   07:00 A.M.           130/70         80             97                            82   06/24/18   08:00 A.M.           103/58         80             96                            81   06/25/18   06:30 A.M.             85/56          98             96

## 2018-06-26 NOTE — TELEPHONE ENCOUNTER
Pt was on BID atenolol with tikosyn but went to tid atenolol when tikosyn stopped, she will go back to BID atenolol with 3rd dose prn if need for high pulse or BP    To EA to make sure this is what you wanted.

## 2018-06-26 NOTE — TELEPHONE ENCOUNTER
Rates look ok overall.  Is she still taking an extra Atenolol?  If still taking an extra Atenolol consistently, I would take it back to as needed as she and Dr. Haywood had discussed.     BP yesterday a little low, I am glad that she was not dizzy with it.

## 2018-06-27 NOTE — TELEPHONE ENCOUNTER
VICTORIANO Godoy 1 hour ago (10:24 AM)         Dr. Lees,   Here are my most recent readings.     Date & Time                   B.P.                     Pulse                       Oxygen                  Pulse     6/26       1:15 P.M.           124/70              118                              98                      101-133   6727     10:00 A.M.          152/84                80                              98                            82

## 2018-06-28 ENCOUNTER — PATIENT MESSAGE (OUTPATIENT)
Dept: CARDIOLOGY | Facility: MEDICAL CENTER | Age: 78
End: 2018-06-28

## 2018-06-28 NOTE — TELEPHONE ENCOUNTER
Message   Received: Today   Message Contents   VICTORIANO Winslow R.N. Hi Tina,     Will you clarify with Donte what the different heart rate measurements are that she is reporting?  She has a HR and a pulse listed and there are some significant differences between those.  Also how is her rhythm doing?  Is she in and out of rhythm often or more out than in, etc...     Thanks,ramona.      Contacted patient, She states the HR value is from the BP monitor, and the Pulse is from her finger probe pulse ox.  She does feel like she's in Afib, almost constantly.  She is asymptomatic.  Denies CP, dizziness, SOB, difficulty breathing, lightheadedness, arm/jaw pain, headaches, nausea.  She does c/o edema bilat lower feet.  She describes similar to 1+ pitting edema.  Any exertion makes HR high (160s) then she rests and it lowers.     Advised to elevate legs, Discussed s/s of chest pain, chest pressure, shortness of breath, difficulty breathing and when to initiate EMS.  Patient verbalizes understanding.     Patient is compliant with medications.       To Ramona FERGUSON to advise

## 2018-06-28 NOTE — TELEPHONE ENCOUNTER
Message   Received: Today   Message Contents   VICTORIANO Winslow R.N. Hi Tina,     She needs be seen in clinic for evaluation.     Thank you,   ramona Hannah to Scheduling

## 2018-06-29 ENCOUNTER — APPOINTMENT (OUTPATIENT)
Dept: RADIOLOGY | Facility: MEDICAL CENTER | Age: 78
DRG: 243 | End: 2018-06-29
Attending: EMERGENCY MEDICINE
Payer: MEDICARE

## 2018-06-29 ENCOUNTER — HOSPITAL ENCOUNTER (INPATIENT)
Facility: MEDICAL CENTER | Age: 78
LOS: 2 days | DRG: 243 | End: 2018-07-01
Attending: EMERGENCY MEDICINE | Admitting: INTERNAL MEDICINE
Payer: MEDICARE

## 2018-06-29 DIAGNOSIS — I47.10 SVT (SUPRAVENTRICULAR TACHYCARDIA): ICD-10-CM

## 2018-06-29 PROBLEM — R79.89 ELEVATED SERUM CREATININE: Status: ACTIVE | Noted: 2018-06-29

## 2018-06-29 PROBLEM — N17.9 AKI (ACUTE KIDNEY INJURY) (HCC): Status: ACTIVE | Noted: 2018-06-29

## 2018-06-29 PROBLEM — R55 PRE-SYNCOPE: Status: ACTIVE | Noted: 2018-06-29

## 2018-06-29 PROBLEM — D72.829 LEUKOCYTOSIS: Status: ACTIVE | Noted: 2018-06-29

## 2018-06-29 PROBLEM — R79.1 SUPRATHERAPEUTIC INR: Status: ACTIVE | Noted: 2018-06-29

## 2018-06-29 PROBLEM — I95.9 HYPOTENSION: Status: ACTIVE | Noted: 2018-06-29

## 2018-06-29 PROBLEM — R60.0 BILATERAL LOWER EXTREMITY EDEMA: Status: ACTIVE | Noted: 2018-06-29

## 2018-06-29 PROBLEM — Z88.9 MULTIPLE DRUG ALLERGIES: Status: ACTIVE | Noted: 2018-06-29

## 2018-06-29 LAB
ALBUMIN SERPL BCP-MCNC: 3.8 G/DL (ref 3.2–4.9)
ALBUMIN/GLOB SERPL: 1.1 G/DL
ALP SERPL-CCNC: 77 U/L (ref 30–99)
ALT SERPL-CCNC: 14 U/L (ref 2–50)
ANION GAP SERPL CALC-SCNC: 14 MMOL/L (ref 0–11.9)
AST SERPL-CCNC: 26 U/L (ref 12–45)
BASOPHILS # BLD AUTO: 0.5 % (ref 0–1.8)
BASOPHILS # BLD: 0.06 K/UL (ref 0–0.12)
BILIRUB SERPL-MCNC: 0.3 MG/DL (ref 0.1–1.5)
BNP SERPL-MCNC: 163 PG/ML (ref 0–100)
BUN SERPL-MCNC: 27 MG/DL (ref 8–22)
CALCIUM SERPL-MCNC: 9.4 MG/DL (ref 8.5–10.5)
CHLORIDE SERPL-SCNC: 101 MMOL/L (ref 96–112)
CO2 SERPL-SCNC: 24 MMOL/L (ref 20–33)
CREAT SERPL-MCNC: 1.1 MG/DL (ref 0.5–1.4)
EKG IMPRESSION: NORMAL
EOSINOPHIL # BLD AUTO: 0.09 K/UL (ref 0–0.51)
EOSINOPHIL NFR BLD: 0.7 % (ref 0–6.9)
ERYTHROCYTE [DISTWIDTH] IN BLOOD BY AUTOMATED COUNT: 50.6 FL (ref 35.9–50)
GLOBULIN SER CALC-MCNC: 3.4 G/DL (ref 1.9–3.5)
GLUCOSE SERPL-MCNC: 167 MG/DL (ref 65–99)
HCT VFR BLD AUTO: 46.3 % (ref 37–47)
HGB BLD-MCNC: 15.1 G/DL (ref 12–16)
IMM GRANULOCYTES # BLD AUTO: 0.05 K/UL (ref 0–0.11)
IMM GRANULOCYTES NFR BLD AUTO: 0.4 % (ref 0–0.9)
INR PPP: 3.13 (ref 0.87–1.13)
LYMPHOCYTES # BLD AUTO: 2.44 K/UL (ref 1–4.8)
LYMPHOCYTES NFR BLD: 20.3 % (ref 22–41)
MAGNESIUM SERPL-MCNC: 2 MG/DL (ref 1.5–2.5)
MCH RBC QN AUTO: 31.3 PG (ref 27–33)
MCHC RBC AUTO-ENTMCNC: 32.6 G/DL (ref 33.6–35)
MCV RBC AUTO: 96.1 FL (ref 81.4–97.8)
MONOCYTES # BLD AUTO: 0.75 K/UL (ref 0–0.85)
MONOCYTES NFR BLD AUTO: 6.2 % (ref 0–13.4)
NEUTROPHILS # BLD AUTO: 8.62 K/UL (ref 2–7.15)
NEUTROPHILS NFR BLD: 71.9 % (ref 44–72)
NRBC # BLD AUTO: 0 K/UL
NRBC BLD-RTO: 0 /100 WBC
PLATELET # BLD AUTO: 279 K/UL (ref 164–446)
PMV BLD AUTO: 10.3 FL (ref 9–12.9)
POTASSIUM SERPL-SCNC: 4.3 MMOL/L (ref 3.6–5.5)
PROT SERPL-MCNC: 7.2 G/DL (ref 6–8.2)
PROTHROMBIN TIME: 31.9 SEC (ref 12–14.6)
RBC # BLD AUTO: 4.82 M/UL (ref 4.2–5.4)
SODIUM SERPL-SCNC: 139 MMOL/L (ref 135–145)
TROPONIN I SERPL-MCNC: 0.02 NG/ML (ref 0–0.04)
TSH SERPL DL<=0.005 MIU/L-ACNC: 0.81 UIU/ML (ref 0.38–5.33)
WBC # BLD AUTO: 12 K/UL (ref 4.8–10.8)

## 2018-06-29 PROCEDURE — 93005 ELECTROCARDIOGRAM TRACING: CPT

## 2018-06-29 PROCEDURE — 83735 ASSAY OF MAGNESIUM: CPT

## 2018-06-29 PROCEDURE — 85025 COMPLETE CBC W/AUTO DIFF WBC: CPT

## 2018-06-29 PROCEDURE — 99285 EMERGENCY DEPT VISIT HI MDM: CPT

## 2018-06-29 PROCEDURE — 700105 HCHG RX REV CODE 258: Performed by: EMERGENCY MEDICINE

## 2018-06-29 PROCEDURE — 83880 ASSAY OF NATRIURETIC PEPTIDE: CPT

## 2018-06-29 PROCEDURE — 71045 X-RAY EXAM CHEST 1 VIEW: CPT

## 2018-06-29 PROCEDURE — 700102 HCHG RX REV CODE 250 W/ 637 OVERRIDE(OP): Performed by: EMERGENCY MEDICINE

## 2018-06-29 PROCEDURE — 700111 HCHG RX REV CODE 636 W/ 250 OVERRIDE (IP)

## 2018-06-29 PROCEDURE — A9270 NON-COVERED ITEM OR SERVICE: HCPCS | Performed by: EMERGENCY MEDICINE

## 2018-06-29 PROCEDURE — 94760 N-INVAS EAR/PLS OXIMETRY 1: CPT

## 2018-06-29 PROCEDURE — 770020 HCHG ROOM/CARE - TELE (206)

## 2018-06-29 PROCEDURE — 85610 PROTHROMBIN TIME: CPT

## 2018-06-29 PROCEDURE — 36415 COLL VENOUS BLD VENIPUNCTURE: CPT

## 2018-06-29 PROCEDURE — 700102 HCHG RX REV CODE 250 W/ 637 OVERRIDE(OP): Performed by: STUDENT IN AN ORGANIZED HEALTH CARE EDUCATION/TRAINING PROGRAM

## 2018-06-29 PROCEDURE — 93005 ELECTROCARDIOGRAM TRACING: CPT | Performed by: EMERGENCY MEDICINE

## 2018-06-29 PROCEDURE — 84484 ASSAY OF TROPONIN QUANT: CPT

## 2018-06-29 PROCEDURE — A9270 NON-COVERED ITEM OR SERVICE: HCPCS | Performed by: STUDENT IN AN ORGANIZED HEALTH CARE EDUCATION/TRAINING PROGRAM

## 2018-06-29 PROCEDURE — 700111 HCHG RX REV CODE 636 W/ 250 OVERRIDE (IP): Performed by: EMERGENCY MEDICINE

## 2018-06-29 PROCEDURE — 96374 THER/PROPH/DIAG INJ IV PUSH: CPT

## 2018-06-29 PROCEDURE — 92960 CARDIOVERSION ELECTRIC EXT: CPT

## 2018-06-29 PROCEDURE — 5A2204Z RESTORATION OF CARDIAC RHYTHM, SINGLE: ICD-10-PCS | Performed by: EMERGENCY MEDICINE

## 2018-06-29 PROCEDURE — 84443 ASSAY THYROID STIM HORMONE: CPT

## 2018-06-29 PROCEDURE — 80053 COMPREHEN METABOLIC PANEL: CPT

## 2018-06-29 RX ORDER — ACETAMINOPHEN 500 MG
1000 TABLET ORAL 3 TIMES DAILY
Status: DISCONTINUED | OUTPATIENT
Start: 2018-06-29 | End: 2018-07-01 | Stop reason: HOSPADM

## 2018-06-29 RX ORDER — ESTRADIOL 2 MG/1
2 TABLET ORAL
Status: DISCONTINUED | OUTPATIENT
Start: 2018-06-30 | End: 2018-07-01 | Stop reason: HOSPADM

## 2018-06-29 RX ORDER — AMOXICILLIN 250 MG
2 CAPSULE ORAL 2 TIMES DAILY
Status: DISCONTINUED | OUTPATIENT
Start: 2018-06-29 | End: 2018-07-01 | Stop reason: HOSPADM

## 2018-06-29 RX ORDER — FUROSEMIDE 40 MG/1
80 TABLET ORAL DAILY
Status: DISCONTINUED | OUTPATIENT
Start: 2018-06-30 | End: 2018-06-29

## 2018-06-29 RX ORDER — FAMOTIDINE 20 MG/1
20 TABLET, FILM COATED ORAL DAILY
Status: DISCONTINUED | OUTPATIENT
Start: 2018-06-30 | End: 2018-07-01 | Stop reason: HOSPADM

## 2018-06-29 RX ORDER — SODIUM CHLORIDE 9 MG/ML
1000 INJECTION, SOLUTION INTRAVENOUS ONCE
Status: COMPLETED | OUTPATIENT
Start: 2018-06-29 | End: 2018-06-29

## 2018-06-29 RX ORDER — MIDAZOLAM HYDROCHLORIDE 1 MG/ML
2 INJECTION INTRAMUSCULAR; INTRAVENOUS ONCE
Status: DISCONTINUED | OUTPATIENT
Start: 2018-06-29 | End: 2018-06-29

## 2018-06-29 RX ORDER — DOFETILIDE 0.5 MG/1
500 CAPSULE ORAL 2 TIMES DAILY
Status: DISCONTINUED | OUTPATIENT
Start: 2018-06-29 | End: 2018-06-29

## 2018-06-29 RX ORDER — LEVOTHYROXINE SODIUM 0.05 MG/1
50 TABLET ORAL EVERY MORNING
Status: DISCONTINUED | OUTPATIENT
Start: 2018-06-30 | End: 2018-07-01 | Stop reason: HOSPADM

## 2018-06-29 RX ORDER — MIDAZOLAM HYDROCHLORIDE 1 MG/ML
INJECTION INTRAMUSCULAR; INTRAVENOUS
Status: COMPLETED
Start: 2018-06-29 | End: 2018-06-29

## 2018-06-29 RX ORDER — ACETAMINOPHEN 500 MG
1000 TABLET ORAL ONCE
Status: COMPLETED | OUTPATIENT
Start: 2018-06-29 | End: 2018-06-29

## 2018-06-29 RX ORDER — MIDAZOLAM HYDROCHLORIDE 1 MG/ML
4 INJECTION INTRAMUSCULAR; INTRAVENOUS ONCE
Status: COMPLETED | OUTPATIENT
Start: 2018-06-29 | End: 2018-06-29

## 2018-06-29 RX ORDER — DIGOXIN 125 MCG
125 TABLET ORAL DAILY
Status: DISCONTINUED | OUTPATIENT
Start: 2018-06-30 | End: 2018-07-01 | Stop reason: HOSPADM

## 2018-06-29 RX ORDER — ADENOSINE 3 MG/ML
12 INJECTION, SOLUTION INTRAVENOUS ONCE
Status: COMPLETED | OUTPATIENT
Start: 2018-06-29 | End: 2018-06-29

## 2018-06-29 RX ORDER — WARFARIN SODIUM 2.5 MG/1
2.5-3.75 TABLET ORAL DAILY
Status: DISCONTINUED | OUTPATIENT
Start: 2018-06-29 | End: 2018-06-29

## 2018-06-29 RX ORDER — ADENOSINE 3 MG/ML
INJECTION, SOLUTION INTRAVENOUS
Status: COMPLETED
Start: 2018-06-29 | End: 2018-06-29

## 2018-06-29 RX ORDER — ATENOLOL 50 MG/1
25 TABLET ORAL 2 TIMES DAILY
Status: DISCONTINUED | OUTPATIENT
Start: 2018-06-29 | End: 2018-06-30

## 2018-06-29 RX ORDER — SPIRONOLACTONE 50 MG/1
50 TABLET, FILM COATED ORAL DAILY
COMMUNITY
End: 2018-08-24

## 2018-06-29 RX ORDER — BISACODYL 10 MG
10 SUPPOSITORY, RECTAL RECTAL
Status: DISCONTINUED | OUTPATIENT
Start: 2018-06-29 | End: 2018-07-01 | Stop reason: HOSPADM

## 2018-06-29 RX ORDER — DOFETILIDE 0.5 MG/1
500 CAPSULE ORAL 2 TIMES DAILY
Status: ON HOLD | COMMUNITY
End: 2018-07-01

## 2018-06-29 RX ORDER — ADENOSINE 3 MG/ML
6 INJECTION, SOLUTION INTRAVENOUS ONCE
Status: COMPLETED | OUTPATIENT
Start: 2018-06-29 | End: 2018-06-29

## 2018-06-29 RX ORDER — ADENOSINE 3 MG/ML
INJECTION, SOLUTION INTRAVENOUS
Status: DISPENSED
Start: 2018-06-29 | End: 2018-06-30

## 2018-06-29 RX ORDER — MIDAZOLAM HYDROCHLORIDE 1 MG/ML
3 INJECTION INTRAMUSCULAR; INTRAVENOUS ONCE
Status: COMPLETED | OUTPATIENT
Start: 2018-06-29 | End: 2018-06-29

## 2018-06-29 RX ORDER — POLYETHYLENE GLYCOL 3350 17 G/17G
1 POWDER, FOR SOLUTION ORAL
Status: DISCONTINUED | OUTPATIENT
Start: 2018-06-29 | End: 2018-07-01 | Stop reason: HOSPADM

## 2018-06-29 RX ORDER — SPIRONOLACTONE 25 MG/1
25 TABLET ORAL DAILY
Status: DISCONTINUED | OUTPATIENT
Start: 2018-06-30 | End: 2018-07-01 | Stop reason: HOSPADM

## 2018-06-29 RX ADMIN — MIDAZOLAM 4 MG: 1 INJECTION INTRAMUSCULAR; INTRAVENOUS at 14:29

## 2018-06-29 RX ADMIN — SODIUM CHLORIDE 1000 ML: 9 INJECTION, SOLUTION INTRAVENOUS at 15:51

## 2018-06-29 RX ADMIN — ADENOSINE 12 MG: 3 INJECTION, SOLUTION INTRAVENOUS at 16:09

## 2018-06-29 RX ADMIN — SERTRALINE 25 MG: 50 TABLET, FILM COATED ORAL at 22:19

## 2018-06-29 RX ADMIN — ADENOSINE 12 MG: 3 INJECTION, SOLUTION INTRAVENOUS at 16:14

## 2018-06-29 RX ADMIN — MIDAZOLAM HYDROCHLORIDE 4 MG: 1 INJECTION INTRAMUSCULAR; INTRAVENOUS at 14:29

## 2018-06-29 RX ADMIN — ADENOSINE 6 MG: 3 INJECTION, SOLUTION INTRAVENOUS at 16:05

## 2018-06-29 RX ADMIN — ATENOLOL 25 MG: 50 TABLET ORAL at 23:11

## 2018-06-29 RX ADMIN — MIDAZOLAM 3 MG: 1 INJECTION INTRAMUSCULAR; INTRAVENOUS at 17:01

## 2018-06-29 RX ADMIN — ACETAMINOPHEN 1000 MG: 500 TABLET, FILM COATED ORAL at 19:05

## 2018-06-29 ASSESSMENT — ENCOUNTER SYMPTOMS
SPEECH CHANGE: 0
PALPITATIONS: 1
COUGH: 0
VOMITING: 0
FEVER: 0
BLURRED VISION: 0
SHORTNESS OF BREATH: 0
DIZZINESS: 1
NAUSEA: 0
HEMOPTYSIS: 0
PND: 0
SORE THROAT: 0
ORTHOPNEA: 0
CHILLS: 0
SENSORY CHANGE: 0
ABDOMINAL PAIN: 0
FOCAL WEAKNESS: 0
DIAPHORESIS: 0

## 2018-06-29 ASSESSMENT — COPD QUESTIONNAIRES
DO YOU EVER COUGH UP ANY MUCUS OR PHLEGM?: NO/ONLY WITH OCCASIONAL COLDS OR INFECTIONS
HAVE YOU SMOKED AT LEAST 100 CIGARETTES IN YOUR ENTIRE LIFE: NO/DON'T KNOW
COPD SCREENING SCORE: 2
COPD SCREENING SCORE: 2
DURING THE PAST 4 WEEKS HOW MUCH DID YOU FEEL SHORT OF BREATH: NONE/LITTLE OF THE TIME
DURING THE PAST 4 WEEKS HOW MUCH DID YOU FEEL SHORT OF BREATH: NONE/LITTLE OF THE TIME
DO YOU EVER COUGH UP ANY MUCUS OR PHLEGM?: NO/ONLY WITH OCCASIONAL COLDS OR INFECTIONS
IN THE PAST 12 MONTHS DO YOU DO LESS THAN YOU USED TO BECAUSE OF YOUR BREATHING PROBLEMS: DISAGREE/UNSURE
HAVE YOU SMOKED AT LEAST 100 CIGARETTES IN YOUR ENTIRE LIFE: NO/DON'T KNOW

## 2018-06-29 ASSESSMENT — PATIENT HEALTH QUESTIONNAIRE - PHQ9
1. LITTLE INTEREST OR PLEASURE IN DOING THINGS: NOT AT ALL
SUM OF ALL RESPONSES TO PHQ9 QUESTIONS 1 AND 2: 0
2. FEELING DOWN, DEPRESSED, IRRITABLE, OR HOPELESS: NOT AT ALL

## 2018-06-29 ASSESSMENT — PAIN SCALES - GENERAL
PAINLEVEL_OUTOF10: 0
PAINLEVEL_OUTOF10: 3

## 2018-06-29 ASSESSMENT — LIFESTYLE VARIABLES
EVER_SMOKED: NEVER
EVER_SMOKED: NEVER
DO YOU DRINK ALCOHOL: NO
ALCOHOL_USE: NO

## 2018-06-29 NOTE — ED NOTES
ERP at bedside.  Pt placed on zoll and pads.  Adenosine given for SVT    1605 6 Adenosine  1609 12 Adenosine    1614 12 Adenosine

## 2018-06-29 NOTE — ED TRIAGE NOTES
Donte Magaña    Chief Complaint   Patient presents with   • Atrial Fibrillation   • Near Syncopal     this am at 11 while sitting in shower; no fall; no LOC; felt dizzy and hot like she ws about to faint     Pt BIBA reports feeling fine, denies pain. MD at bedside r/t SVT. Pt A&Ox3. Zoll monitoring pt with pads.

## 2018-06-29 NOTE — TELEPHONE ENCOUNTER
Patient called, transferred by , and stated she felt like she was going to faint while in the shower today.  Denies CP.    She states her BP and HR are all over the place.  She was unable to give an example.  Patient crying stating she's scared. Asked for examples of BP, patient incoherent due to crying stating she tried writing them down.    Explained we would like to see her in the clinic asap.  Patient declining stating she wants to call [facility] now.  Stated if she felt this was an emergency then call [facility], Patient states she is only calling to notify Dr. Haywood she will be going to the ER.      Ended phone call.       TREVOR DAILY EA

## 2018-06-29 NOTE — TELEPHONE ENCOUNTER
APN would like patient seen asap (in addition to the 7/12 appt).    2nd task sent to scheduling

## 2018-06-29 NOTE — ED NOTES
HR still 130-150 's.  Pt resting comfortably on gurney, no distress noted, no labored breathing.  See narrator for vitals.  Cardiology called.

## 2018-06-29 NOTE — ED PROVIDER NOTES
CHIEF COMPLAINT  Chief Complaint   Patient presents with   • Atrial Fibrillation   • Near Syncopal     this am at 11 while sitting in shower; no fall; no LOC; felt dizzy and hot like she ws about to faint       HPI  Donte Magaña is a 77 y.o. female here for evaluation of near syncope, while in the shower.  The pt did not pass out, and did not fall.  She has no head injury, no neck pain, no back pain, no abdominal pain, no cp, and no sob.  She felt her heart 'beating fast.'  She has a  History of the same, and a history of atrial fibrillation.   No fever, no vomiting.  She is on anticoagulants.     PAST MEDICAL HISTORY   has a past medical history of A-fib (HCC); Anesthesia; Anticoagulant long-term use (1/12/2012); Arthritis; Asthma; Atrial fibrillation (HCC); Backpain; Breath shortness; Bronchitis (Nov, 2013); CAD (coronary artery disease); Depression; Glaucoma (5/3/2011); Hematoma complicating a procedure (11/3/2012); High cholesterol; Hypertension; Hypothyroid; Lupus; Macular degeneration; Menopause (1/12/2012); Mitral regurgitation (10/30/2012); Obesity (1/12/2012); Pneumonia (feb,2013); Pulmonary hypertension (HCC) (10/30/2012); PVC's (premature ventricular contractions) (1/12/2012); Senile nuclear sclerosis; Spinal stenosis of lumbar region at multiple levels; Unspecified cataract; and Unspecified urinary incontinence.    SOCIAL HISTORY  Social History     Social History Main Topics   • Smoking status: Never Smoker   • Smokeless tobacco: Never Used   • Alcohol use No   • Drug use: No   • Sexual activity: Not Currently     Partners: Male     Birth control/ protection: Post-Menopausal       SURGICAL HISTORY   has a past surgical history that includes tonsillectomy and adenoidectomy; recovery (11/30/2010); colonoscopy (2008); abdominal hysterectomy total (April 15,1975); other; cataract phaco with iol (4/21/2015); cataract phaco with iol (Right, 5/5/2015); open reduction; knee arthroplasty total (Left,  "5/28/2015); and knee arthroplasty total (Right, 6/23/2016).    CURRENT MEDICATIONS  Home Medications    **Home medications have not yet been reviewed for this encounter**         ALLERGIES  Allergies   Allergen Reactions   • Amiodarone Hives     Throat and tongue itching   • Bactrim Shortness of Breath   • Claritin-D [Loratadine-Pseudoephedrine] Palpitations   • Clotrimazole      Stinging / burning on chest   • Metoprolol      Causes throat swelling   • Morphine      Hallucinations   • Qvar [Beclomethasone Dipropionate]      Pressure on heart     • Vibramycin Shortness of Breath   • Atorvastatin Calcium-Polysorbate 80      Muscle aches     • Augmentin      Unknown reaction   • Cipro Xr Swelling   • Diltiazem      rash   • Flecainide      dizziness   • Keflex Unspecified     Pt states \"Unsure\".   • Mucinex      GI Distress     • Tramadol      crying   • Tape Rash     Paper tape okay       REVIEW OF SYSTEMS  See HPI for further details. Review of systems as above, otherwise all other systems are negative.     PHYSICAL EXAM  Constitutional: Well developed, well nourished. mild acute distress.  HEENT: Normocephalic, atraumatic. Posterior pharynx clear and moist.  Eyes:  EOMI. Normal sclera.  Neck: Supple, Full range of motion, nontender.  Chest/Pulmonary: clear to ausculation. Symmetrical expansion.   Cardio:  Tachycardic rate and rhythm with no murmur.   Abdomen: Soft, nontender. No peritoneal signs. No guarding. No palpable masses.  Musculoskeletal: No deformity, no edema, neurovascular intact.   Neuro: Clear speech, appropriate, cooperative, cranial nerves II-XII grossly intact.  Psych: anxious.   Skin:  Warm, dry, no rash.     Results for orders placed or performed during the hospital encounter of 06/29/18   CBC w/ Differential   Result Value Ref Range    WBC 12.0 (H) 4.8 - 10.8 K/uL    RBC 4.82 4.20 - 5.40 M/uL    Hemoglobin 15.1 12.0 - 16.0 g/dL    Hematocrit 46.3 37.0 - 47.0 %    MCV 96.1 81.4 - 97.8 fL    MCH " 31.3 27.0 - 33.0 pg    MCHC 32.6 (L) 33.6 - 35.0 g/dL    RDW 50.6 (H) 35.9 - 50.0 fL    Platelet Count 279 164 - 446 K/uL    MPV 10.3 9.0 - 12.9 fL    Neutrophils-Polys 71.90 44.00 - 72.00 %    Lymphocytes 20.30 (L) 22.00 - 41.00 %    Monocytes 6.20 0.00 - 13.40 %    Eosinophils 0.70 0.00 - 6.90 %    Basophils 0.50 0.00 - 1.80 %    Immature Granulocytes 0.40 0.00 - 0.90 %    Nucleated RBC 0.00 /100 WBC    Neutrophils (Absolute) 8.62 (H) 2.00 - 7.15 K/uL    Lymphs (Absolute) 2.44 1.00 - 4.80 K/uL    Monos (Absolute) 0.75 0.00 - 0.85 K/uL    Eos (Absolute) 0.09 0.00 - 0.51 K/uL    Baso (Absolute) 0.06 0.00 - 0.12 K/uL    Immature Granulocytes (abs) 0.05 0.00 - 0.11 K/uL    NRBC (Absolute) 0.00 K/uL   Complete Metabolic Panel (CMP)   Result Value Ref Range    Sodium 139 135 - 145 mmol/L    Potassium 4.3 3.6 - 5.5 mmol/L    Chloride 101 96 - 112 mmol/L    Co2 24 20 - 33 mmol/L    Anion Gap 14.0 (H) 0.0 - 11.9    Glucose 167 (H) 65 - 99 mg/dL    Bun 27 (H) 8 - 22 mg/dL    Creatinine 1.10 0.50 - 1.40 mg/dL    Calcium 9.4 8.5 - 10.5 mg/dL    AST(SGOT) 26 12 - 45 U/L    ALT(SGPT) 14 2 - 50 U/L    Alkaline Phosphatase 77 30 - 99 U/L    Total Bilirubin 0.3 0.1 - 1.5 mg/dL    Albumin 3.8 3.2 - 4.9 g/dL    Total Protein 7.2 6.0 - 8.2 g/dL    Globulin 3.4 1.9 - 3.5 g/dL    A-G Ratio 1.1 g/dL   Btype Natriuretic Peptide   Result Value Ref Range    B Natriuretic Peptide 163 (H) 0 - 100 pg/mL   Troponin STAT   Result Value Ref Range    Troponin I 0.02 0.00 - 0.04 ng/mL   PT/INR   Result Value Ref Range    PT 31.9 (H) 12.0 - 14.6 sec    INR 3.13 (H) 0.87 - 1.13   ESTIMATED GFR   Result Value Ref Range    GFR If  58 (A) >60 mL/min/1.73 m 2    GFR If Non  48 (A) >60 mL/min/1.73 m 2   EKG (NOW)   Result Value Ref Range    Report       [facility] Emergency Dept.    Test Date:  2018-06-29  Pt Name:    STEPHANIE ISSAC                 Department: ER  MRN:        [id number]                       Room:       [facility]  Gender:     Female                       Technician: [id number]  :        [id number]                   Requested By:ER TRIAGE PROTOCOL  Order #:    [id number]                    Reading MD:    Measurements  Intervals                                Axis  Rate:       160                          P:          0  ME:         116                          QRS:        -29  QRSD:       68                           T:          56  QT:         292  QTc:        477    Interpretive Statements  SUPRAVENTRICULAR TACHYCARDIA  BORDERLINE LEFT AXIS DEVIATION  BORDERLINE LOW VOLTAGE IN FRONTAL LEADS  ST DEPRESSION, PROBABLY RATE RELATED  Compared to ECG 2018 15:59:14  ST (T wave) deviation now present  Atrial fibrillation no longer present  Early repolarization no longer present  Prolonged QT interval no longer p resent       *Note: Due to a large number of results and/or encounters for the requested time period, some results have not been displayed. A complete set of results can be found in Results Review.     DX-CHEST-PORTABLE (1 VIEW)   Final Result      1.  There is no acute cardiopulmonary process.   2.  There is apparent artifact due to something on or under the patient causing nodular densities in both upper hemithoraces.            PROCEDURES   Cardioversion Procedure,chemical.    Indication: supraventricular tachycardia    Consent: The patient provided verbal consent for this procedure.    Pre-Medication: none    Procedure: The patient was placed in the supine position and the chest area was exposed. The cardioversion pads were applied in the standard manner and configuration.    Attempt #1: adenosine 6mg given, rhythm slowed, went back to 167    Attempt #2: adenosine 12mg given, rhythm slowed, went back to mid 160s    Attempt #3: adenosine 12 mg given, rhythm slowed, back to 160s.    The patient tolerated the procedure well.    Complications: None          Cardioversion Procedure    Indication:  supraventricular tachycardia    Consent: The patient provided verbal consent for this procedure.    Pre-Medication: none    Procedure: The patient was placed in the supine position and the chest area was exposed. The cardioversion pads were applied in the standard manner and configuration.    Versed given 4mg. IV    Attempt #1: The defibrillator was set on the synchronous mode and charged to 120 joules.  A charge was then delivered which resulted in no change in rhythm.    Versed given 3mg IV    Attempt #2: The defibrillator was set on the synchronous and charged to 200 joules.  A charge was then delivered which resulted in  no change in rhythm.    Attempt #3: Not necessary    The patient tolerated the procedure well.    Complications: None      MEDICAL RECORD  I have reviewed patient's medical record and pertinent results are listed above.    COURSE & MEDICAL DECISION MAKING  I have reviewed any medical record information, laboratory studies and radiographic results as noted above.    CRITICAL CARE  The very real possibility of a deterioration of this patient's condition required the highest level of my preparedness for sudden, emergent intervention.  I provided critical care services, which included medication orders, frequent reevaluations of the patient's condition and response to treatment, ordering and reviewing test results, and discussing the case with various consultants.  The critical care time associated with the care of the patient was 60  minutes. Review chart for interventions. This time is exclusive of any other billable procedures.     Pt was given iv fluids secondary to her tachycardia, and possible sepsis.    The pt feels better after iv fluids.       Dr. Perry came to see the pt, and was present for the second cardioversion.  It was partially unsuccessful (heart rate came down to the low 130s ),however he states to have the pt go upstairs, and he will continue to evaluate.   She is currently  asymptomatic.     The pt will be admitted to R.  She is comfortable at this time.     If you have had any blood pressure issues while here in the emergency department, please see your doctor for a further evaluation or work up.    Differential diagnoses include but not limited to: svt, a fib with rvr.    FINAL IMPRESSION  1. SVT (supraventricular tachycardia) (HCC)    2.      Critical care 60 minutes.     Electronically signed by: Agusto Valladares, 6/29/2018 4:19 PM

## 2018-06-30 ENCOUNTER — APPOINTMENT (OUTPATIENT)
Dept: RADIOLOGY | Facility: MEDICAL CENTER | Age: 78
DRG: 243 | End: 2018-06-30
Attending: INTERNAL MEDICINE
Payer: MEDICARE

## 2018-06-30 LAB
ALBUMIN SERPL BCP-MCNC: 3.4 G/DL (ref 3.2–4.9)
ALBUMIN/GLOB SERPL: 1.2 G/DL
ALP SERPL-CCNC: 63 U/L (ref 30–99)
ALT SERPL-CCNC: 12 U/L (ref 2–50)
ANION GAP SERPL CALC-SCNC: 9 MMOL/L (ref 0–11.9)
AST SERPL-CCNC: 16 U/L (ref 12–45)
BASOPHILS # BLD AUTO: 0.6 % (ref 0–1.8)
BASOPHILS # BLD: 0.06 K/UL (ref 0–0.12)
BILIRUB SERPL-MCNC: 0.4 MG/DL (ref 0.1–1.5)
BUN SERPL-MCNC: 21 MG/DL (ref 8–22)
CALCIUM SERPL-MCNC: 8.9 MG/DL (ref 8.5–10.5)
CHLORIDE SERPL-SCNC: 105 MMOL/L (ref 96–112)
CHOLEST SERPL-MCNC: 140 MG/DL (ref 100–199)
CO2 SERPL-SCNC: 24 MMOL/L (ref 20–33)
CREAT SERPL-MCNC: 0.77 MG/DL (ref 0.5–1.4)
DIGOXIN SERPL-MCNC: 0.8 NG/ML (ref 0.8–2)
EKG IMPRESSION: NORMAL
EKG IMPRESSION: NORMAL
EOSINOPHIL # BLD AUTO: 0.33 K/UL (ref 0–0.51)
EOSINOPHIL NFR BLD: 3.3 % (ref 0–6.9)
ERYTHROCYTE [DISTWIDTH] IN BLOOD BY AUTOMATED COUNT: 52 FL (ref 35.9–50)
GLOBULIN SER CALC-MCNC: 2.9 G/DL (ref 1.9–3.5)
GLUCOSE SERPL-MCNC: 98 MG/DL (ref 65–99)
HCT VFR BLD AUTO: 43.6 % (ref 37–47)
HDLC SERPL-MCNC: 32 MG/DL
HGB BLD-MCNC: 13.9 G/DL (ref 12–16)
IMM GRANULOCYTES # BLD AUTO: 0.03 K/UL (ref 0–0.11)
IMM GRANULOCYTES NFR BLD AUTO: 0.3 % (ref 0–0.9)
INR PPP: 3.24 (ref 0.87–1.13)
LDLC SERPL CALC-MCNC: 47 MG/DL
LYMPHOCYTES # BLD AUTO: 3.56 K/UL (ref 1–4.8)
LYMPHOCYTES NFR BLD: 35.5 % (ref 22–41)
MCH RBC QN AUTO: 30.8 PG (ref 27–33)
MCHC RBC AUTO-ENTMCNC: 31.9 G/DL (ref 33.6–35)
MCV RBC AUTO: 96.7 FL (ref 81.4–97.8)
MONOCYTES # BLD AUTO: 0.91 K/UL (ref 0–0.85)
MONOCYTES NFR BLD AUTO: 9.1 % (ref 0–13.4)
NEUTROPHILS # BLD AUTO: 5.13 K/UL (ref 2–7.15)
NEUTROPHILS NFR BLD: 51.2 % (ref 44–72)
NRBC # BLD AUTO: 0 K/UL
NRBC BLD-RTO: 0 /100 WBC
PLATELET # BLD AUTO: 239 K/UL (ref 164–446)
PMV BLD AUTO: 10.2 FL (ref 9–12.9)
POTASSIUM SERPL-SCNC: 4.2 MMOL/L (ref 3.6–5.5)
PROT SERPL-MCNC: 6.3 G/DL (ref 6–8.2)
PROTHROMBIN TIME: 32.8 SEC (ref 12–14.6)
RBC # BLD AUTO: 4.51 M/UL (ref 4.2–5.4)
SODIUM SERPL-SCNC: 138 MMOL/L (ref 135–145)
TRIGL SERPL-MCNC: 307 MG/DL (ref 0–149)
TROPONIN I SERPL-MCNC: 0.01 NG/ML (ref 0–0.04)
WBC # BLD AUTO: 10 K/UL (ref 4.8–10.8)

## 2018-06-30 PROCEDURE — A9270 NON-COVERED ITEM OR SERVICE: HCPCS | Performed by: INTERNAL MEDICINE

## 2018-06-30 PROCEDURE — 99152 MOD SED SAME PHYS/QHP 5/>YRS: CPT

## 2018-06-30 PROCEDURE — 0JH606Z INSERTION OF PACEMAKER, DUAL CHAMBER INTO CHEST SUBCUTANEOUS TISSUE AND FASCIA, OPEN APPROACH: ICD-10-PCS | Performed by: INTERNAL MEDICINE

## 2018-06-30 PROCEDURE — 84484 ASSAY OF TROPONIN QUANT: CPT

## 2018-06-30 PROCEDURE — 700102 HCHG RX REV CODE 250 W/ 637 OVERRIDE(OP): Performed by: STUDENT IN AN ORGANIZED HEALTH CARE EDUCATION/TRAINING PROGRAM

## 2018-06-30 PROCEDURE — 71045 X-RAY EXAM CHEST 1 VIEW: CPT

## 2018-06-30 PROCEDURE — 99223 1ST HOSP IP/OBS HIGH 75: CPT | Mod: GC | Performed by: INTERNAL MEDICINE

## 2018-06-30 PROCEDURE — A9270 NON-COVERED ITEM OR SERVICE: HCPCS | Performed by: STUDENT IN AN ORGANIZED HEALTH CARE EDUCATION/TRAINING PROGRAM

## 2018-06-30 PROCEDURE — 02HK3JZ INSERTION OF PACEMAKER LEAD INTO RIGHT VENTRICLE, PERCUTANEOUS APPROACH: ICD-10-PCS | Performed by: INTERNAL MEDICINE

## 2018-06-30 PROCEDURE — 36415 COLL VENOUS BLD VENIPUNCTURE: CPT

## 2018-06-30 PROCEDURE — 93005 ELECTROCARDIOGRAM TRACING: CPT | Performed by: INTERNAL MEDICINE

## 2018-06-30 PROCEDURE — 304952 HCHG R 2 PADS

## 2018-06-30 PROCEDURE — C1785 PMKR, DUAL, RATE-RESP: HCPCS

## 2018-06-30 PROCEDURE — 770020 HCHG ROOM/CARE - TELE (206)

## 2018-06-30 PROCEDURE — C1892 INTRO/SHEATH,FIXED,PEEL-AWAY: HCPCS

## 2018-06-30 PROCEDURE — 93010 ELECTROCARDIOGRAM REPORT: CPT | Performed by: INTERNAL MEDICINE

## 2018-06-30 PROCEDURE — 02H63JZ INSERTION OF PACEMAKER LEAD INTO RIGHT ATRIUM, PERCUTANEOUS APPROACH: ICD-10-PCS | Performed by: INTERNAL MEDICINE

## 2018-06-30 PROCEDURE — 80061 LIPID PANEL: CPT

## 2018-06-30 PROCEDURE — 85610 PROTHROMBIN TIME: CPT

## 2018-06-30 PROCEDURE — 85025 COMPLETE CBC W/AUTO DIFF WBC: CPT

## 2018-06-30 PROCEDURE — 99153 MOD SED SAME PHYS/QHP EA: CPT

## 2018-06-30 PROCEDURE — C1898 LEAD, PMKR, OTHER THAN TRANS: HCPCS

## 2018-06-30 PROCEDURE — 305387 HCHG SUTURES

## 2018-06-30 PROCEDURE — 5A2204Z RESTORATION OF CARDIAC RHYTHM, SINGLE: ICD-10-PCS | Performed by: INTERNAL MEDICINE

## 2018-06-30 PROCEDURE — 80162 ASSAY OF DIGOXIN TOTAL: CPT

## 2018-06-30 PROCEDURE — 33208 INSRT HEART PM ATRIAL & VENT: CPT

## 2018-06-30 PROCEDURE — 304853 HCHG PPM TEST CABLE

## 2018-06-30 PROCEDURE — 700102 HCHG RX REV CODE 250 W/ 637 OVERRIDE(OP): Performed by: INTERNAL MEDICINE

## 2018-06-30 PROCEDURE — 80053 COMPREHEN METABOLIC PANEL: CPT

## 2018-06-30 PROCEDURE — 700111 HCHG RX REV CODE 636 W/ 250 OVERRIDE (IP)

## 2018-06-30 PROCEDURE — 92960 CARDIOVERSION ELECTRIC EXT: CPT

## 2018-06-30 PROCEDURE — 93010 ELECTROCARDIOGRAM REPORT: CPT | Mod: 77 | Performed by: INTERNAL MEDICINE

## 2018-06-30 RX ORDER — MIDAZOLAM HYDROCHLORIDE 1 MG/ML
INJECTION INTRAMUSCULAR; INTRAVENOUS
Status: COMPLETED
Start: 2018-06-30 | End: 2018-06-30

## 2018-06-30 RX ORDER — ATORVASTATIN CALCIUM 10 MG/1
10 TABLET, FILM COATED ORAL
Status: DISCONTINUED | OUTPATIENT
Start: 2018-06-30 | End: 2018-07-01 | Stop reason: HOSPADM

## 2018-06-30 RX ORDER — WARFARIN SODIUM 2.5 MG/1
2.5 TABLET ORAL
Status: DISCONTINUED | OUTPATIENT
Start: 2018-07-01 | End: 2018-06-30

## 2018-06-30 RX ORDER — WARFARIN SODIUM 1 MG/1
0.5 TABLET ORAL
Status: DISCONTINUED | OUTPATIENT
Start: 2018-06-30 | End: 2018-06-30

## 2018-06-30 RX ORDER — ATENOLOL 50 MG/1
50 TABLET ORAL 2 TIMES DAILY
Status: DISCONTINUED | OUTPATIENT
Start: 2018-06-30 | End: 2018-07-01

## 2018-06-30 RX ORDER — WARFARIN SODIUM 7.5 MG/1
3.75 TABLET ORAL
Status: DISCONTINUED | OUTPATIENT
Start: 2018-07-02 | End: 2018-06-30

## 2018-06-30 RX ADMIN — ATENOLOL 50 MG: 50 TABLET ORAL at 21:55

## 2018-06-30 RX ADMIN — ASPIRIN 81 MG: 81 TABLET, COATED ORAL at 17:45

## 2018-06-30 RX ADMIN — STANDARDIZED SENNA CONCENTRATE AND DOCUSATE SODIUM 1 TABLET: 8.6; 5 TABLET, FILM COATED ORAL at 17:44

## 2018-06-30 RX ADMIN — ATENOLOL 50 MG: 50 TABLET ORAL at 15:55

## 2018-06-30 RX ADMIN — LEVOTHYROXINE SODIUM 50 MCG: 50 TABLET ORAL at 05:04

## 2018-06-30 RX ADMIN — ACETAMINOPHEN 1000 MG: 500 TABLET, FILM COATED ORAL at 21:55

## 2018-06-30 RX ADMIN — ACETAMINOPHEN 1000 MG: 500 TABLET, FILM COATED ORAL at 05:04

## 2018-06-30 RX ADMIN — FENTANYL CITRATE 100 MCG: 50 INJECTION, SOLUTION INTRAMUSCULAR; INTRAVENOUS at 14:27

## 2018-06-30 RX ADMIN — FENTANYL CITRATE 100 MCG: 50 INJECTION, SOLUTION INTRAMUSCULAR; INTRAVENOUS at 14:00

## 2018-06-30 RX ADMIN — VITAMIN D, TAB 1000IU (100/BT) 2000 UNITS: 25 TAB at 05:03

## 2018-06-30 RX ADMIN — LIDOCAINE HYDROCHLORIDE 100 MG: 20 INJECTION, SOLUTION INTRAVENOUS at 14:02

## 2018-06-30 RX ADMIN — MIDAZOLAM 2 MG: 1 INJECTION INTRAMUSCULAR; INTRAVENOUS at 14:13

## 2018-06-30 RX ADMIN — VANCOMYCIN HYDROCHLORIDE 2000 MG: 1 INJECTION, POWDER, LYOPHILIZED, FOR SOLUTION INTRAVENOUS at 13:59

## 2018-06-30 RX ADMIN — MIDAZOLAM 2 MG: 1 INJECTION INTRAMUSCULAR; INTRAVENOUS at 14:27

## 2018-06-30 RX ADMIN — MIDAZOLAM 4 MG: 1 INJECTION INTRAMUSCULAR; INTRAVENOUS at 14:00

## 2018-06-30 RX ADMIN — SPIRONOLACTONE 25 MG: 25 TABLET, FILM COATED ORAL at 05:03

## 2018-06-30 RX ADMIN — ESTRADIOL 2 MG: 2 TABLET ORAL at 05:04

## 2018-06-30 RX ADMIN — SERTRALINE 25 MG: 50 TABLET, FILM COATED ORAL at 17:45

## 2018-06-30 RX ADMIN — FAMOTIDINE 20 MG: 20 TABLET, FILM COATED ORAL at 05:03

## 2018-06-30 RX ADMIN — DIGOXIN 125 MCG: 125 TABLET ORAL at 05:03

## 2018-06-30 ASSESSMENT — ENCOUNTER SYMPTOMS
NAUSEA: 0
EYE PAIN: 0
COUGH: 0
ABDOMINAL PAIN: 0
DEPRESSION: 0
HEADACHES: 0
BLURRED VISION: 0
BLOOD IN STOOL: 0
CLAUDICATION: 0
LOSS OF CONSCIOUSNESS: 0
PALPITATIONS: 0
FALLS: 0
EYE DISCHARGE: 0
PND: 0
SENSORY CHANGE: 0
ORTHOPNEA: 0
CHILLS: 0
DOUBLE VISION: 0
VOMITING: 0
MYALGIAS: 0
HALLUCINATIONS: 0
SPEECH CHANGE: 0
FEVER: 0
DIZZINESS: 0
SHORTNESS OF BREATH: 0
WEIGHT LOSS: 0
BRUISES/BLEEDS EASILY: 0

## 2018-06-30 ASSESSMENT — PAIN SCALES - GENERAL
PAINLEVEL_OUTOF10: 5
PAINLEVEL_OUTOF10: 0

## 2018-06-30 NOTE — ASSESSMENT & PLAN NOTE
- this am while in the shower  - no falls/ LOC  - likely 2/2 tachycardia in the 160s  - resolved on admission  - fall precautions in place

## 2018-06-30 NOTE — CARE PLAN
Problem: Safety  Goal: Will remain free from injury  Outcome: PROGRESSING AS EXPECTED  Bed locked and in lowest position. Bed alarm on. Treaded socks. Call light and belongings within reach. Patient educated to call for assistance. Pt verbalized understanding. Hourly rounding in place.     Problem: Knowledge Deficit  Goal: Knowledge of disease process/condition, treatment plan, diagnostic tests, and medications will improve  Outcome: PROGRESSING AS EXPECTED  Discussed POC and medications with patient, pt. verbalized understanding.

## 2018-06-30 NOTE — ED NOTES
Cardiologist at bedside Dr Perry for cardioversion    1701- Pt give 3 versed   1703- Cardioversion at 200 J    Pt tolerated procedure well.  Pt on 3 L O2.  Pt HR still  150-160's.

## 2018-06-30 NOTE — ED NOTES
KINDER FALL RISK       RISK  Present to ED b/c of fall (syncope, seizure, or ALOC) no  Age  >70 yes  Altered Mental Status (Intoxicated with alcohol or substance confusion, inability to follow instructions, disorientation) no  Impaired Mobility (Ambulates or transfers with assistive devices or assist. Ambulates with unsteady gait and no assistance.  Unable to ambulate to transfer.) yes  Nursing Judgement (Bowel or bladder incontinence, diarrhea, urinary frequency or urgency, leg weakness, orthostatic hypotension, dizziness or vertigo, narcotic use.)    YES TO ANY RISK = HIGH FALL RISK     1. Move patient closer to nurses stations  2. Familiarize the patient with environment  3. Place call light within reach and demonstrate call light use  4. Keep patients personal possessions within patient safe reach (if appropriate)   5. Place stretcher in low position and brakes locked  6.Place yellow socks and armband on patient   7. Place green triangle on patients door  8. Give patient urinal if applicable  9. Keep floor surfaces clean and dry  10.Keep patient care areas uncluttered  11. Use a lap belt or posey vest   12. Assess patient hourly for :Pain, persona needs, position change, and call light access.

## 2018-06-30 NOTE — CONSULTS
DATE OF SERVICE:  06/30/2018    ELECTROPHYSIOLOGY CONSULTATION    REASON FOR CONSULT:  Atrial flutter with rapid ventricular response and   post-conversion pauses, presyncope.    HISTORY OF PRESENT ILLNESS:  This is a pleasant 77-year-old white female with   previous history of persistent atrial fibrillation, multiple intolerances to   medications including Tikosyn, Multaq, sotalol, amiodarone, and flecainide who   came in with presyncope yesterday, was in the ER, received cardioversion, has   gone back into atrial arrhythmias that is paroxysmal in nature, but when the   patient will convert to sinus rhythm she spontaneously would have greater than   3 second pauses.    PAST SURGICAL HISTORY:  Includes knee replacement and cataract surgery.    SOCIAL HISTORY:  She is a nonsmoker.  She is .    FAMILY HISTORY:  Positive for heart disease.    REVIEW OF SYSTEMS:  CONSTITUTIONAL:  Mild fatigue.  EYES:  Negative.  ENT:  Negative.  PULMONARY:  Mild asthma.  CARDIAC:  As above.  GASTROINTESTINAL:  Negative.  RENAL:  Negative.    Rest of the review of systems is negative by the AMA/CMS criteria.    FAMILY HISTORY:  Includes some heart disease.  She does not smoke.  Denies   alcohol use.  She is retired.    MEDICATIONS:  Currently include:  1.  Tylenol.  2.  Digoxin.  3.  Estradiol.  4.  Famotidine.  5.  Levothyroxine.  6.  Coumadin.  7.  Zoloft.  8.  Spironolactone.  9.  Vitamin D.    PHYSICAL EXAMINATION:  GENERAL:  This is a white female, in no acute distress.  VITAL SIGNS:  Blood pressure is 110/68, pulse is 99, and respirations 16.  HEENT:  JVD is 7 cm.  Carotids without bruits.  LUNGS:  Clear.  CARDIAC:  Sounds slightly irregular.  Normal S1, S2, without murmurs or   gallops.  ABDOMEN:  Soft and nontender without hepatosplenomegaly.  EXTREMITIES:  No clubbing, cyanosis, or edema.  NEUROLOGIC:  Alert and oriented x3.  Mood and affect is appropriate.  SKIN:  Turgor is normal.    LABORATORY DATA:  INR is 3.24.   White count is 10.0, hemoglobin 13.9,   hematocrit 43.6, and platelet count is 239,000.  Chemistries are unremarkable.    Troponin is 0.02 and 0.02.  BNP is 163.    Echocardiogram from 12/17/2017 showed preserved LV function at 50-55%.  EKG   here shows atrial flutter/atrial tachycardia with 3:2 conduction, rate of 150.    Previous EKGs showed 1:1 with the flutter and rate of 150.  Rhythm strips   show evidence of post-conversion pauses.    ASSESSMENT:  Recurrent atrial arrhythmias.    RECOMMENDATIONS:  1.  Placement of permanent pacemaker and AV izzy blocking agents.  2.  Long term, patient may require AV izzy ablation or repeat catheter   ablation of atrial arrhythmias.  3.  Anticoagulation, hold for now.  4.  Risks and benefits of procedure including inherent risks of permanent   pacemaker explained to the patient.  Patient understands and elected to   proceed with the procedure.       ____________________________________     MD JANE OSORIO / NTS    DD:  06/30/2018 09:45:42  DT:  06/30/2018 10:54:45    D#:  [id number]  Job#:  [id number]

## 2018-06-30 NOTE — CATH LAB
Immediate Post-Operative Note    PreOp Diagnosis: Tachy wilfredo    PostOp Diagnosis: PPM attempted cardioversion and pace termination    Surgeon(s):  Bo Matthews M.D.    Type of Anesthesia: Moderate Sedation    Specimen: None    Findings: RV and RA 1 V, unable to pace terminate, cardioversion with quick recurrence of AT multiple times    Complications: none      Bo Matthews M.D.  6/30/2018 2:28 PM

## 2018-06-30 NOTE — ED NOTES
Pt to floor with hospital transport.  Pt has zoll.  Patient has chart and all belongings.  Vitals up to date.

## 2018-06-30 NOTE — PROGRESS NOTES
Two RN skin assessment completed with DANNY Solis.  Generalized redness noted throughout.   Bilateral ears red and blanching.   Sacrum red and blanching.   Swelling noted to bilateral lower extremities.   Bilateral heels dry, calloused, red, and blanching.   Skin otherwise intact.

## 2018-06-30 NOTE — ASSESSMENT & PLAN NOTE
- s/p ablation 12/2017 and multiple cardioversions in the past  - CHADVASC 4  - on chronic anticoagulation with warfarin  - home meds also include atenolol, digoxin, dofetilide  - reports intolerance to dofetilide- had diarrhea   - Failed adenosine x 3-  6, 12, 12mg and cardioversion x 2 (120 J and 200 J)  - Repeat EKG showed HR in 160s, afib with RVR, QTc of 438 with no significant ST T wave changes.  - Dr. Perry evaluated patient in  ED, recommended to get patient admitted and per note will consult EP for possible AV izzy ablation and permanent pacemaker placement.  - continuing warfarin w/ goal INR 2-3 and continuing digoxin  - atenolol held overnight d/t pauses; cardiology to determine whether to continue  - dofetilide held overnight per pharmacy protocol; cardiology to determine whether to continue  - goal K>4, Mg >2

## 2018-06-30 NOTE — RESPIRATORY CARE
COPD EDUCATION by COPD CLINICAL EDUCATOR  6/30/2018 at 6:55 AM by Soraida Roberts     Patient reviewed by COPD education team. Patient does not qualify for COPD program.

## 2018-06-30 NOTE — PROGRESS NOTES
Internal Medicine Interval Note  Note Author: Sabrina Plata M.D.     Name Donte Magaña     1940   Age/Sex 77 y.o. female   MRN [id number]   Code Status FULL CODE     After 5PM or if no immediate response to page, please call for cross-coverage  Attending/Team: Dr. Chapa See Patient List for primary contact information  Call [phone] to page    1st Call - Day Intern (R1):   Dr. Plata 2nd Call - Day Sr. Resident (R2/R3):   Dr. Milner         Reason for interval visit  (Principal Problem)   Persistent atrial fibrillation (HCC)    Interval Problem Daily Status Update  (24 hours)   -pacemaker placement today per cardiology  -cardiology to decide on atenolol, dofetilide; will f/u recs  -she is asymptomatic, states that she has never previously had sx like her presyncope prior to arrival, although she does frequently get fatigued with exertion  -per review of her medical records following outpatient with cardiology, as of 2018 she was noted by cardiology (Dr. Haywood) to have CAD (moderate on  angiogram, normal/reassuring NPI , no angina) and to be on ASA and statin (she was also on warfarin at that time, so this is not the reason ASA discontinued). Note of ASA/statin seem to have dropped off by later notes, but when patient was asked today why these were stopped she states (confusingly) that orthopedics told her she no longer needed to take them. There are no more recent orthopedics notes beyond 2016. Possible that she simply ran out of prescriptions; however, she does not note adverse effects to taking them so they will be restarted at this time.    ROS  Constitutional: Positive for malaise/fatigue. Negative for chills, diaphoresis and fever.   HENT: Negative for congestion and sore throat.    Eyes: Negative for blurred vision.   Respiratory: Negative for cough, hemoptysis and shortness of breath.    Cardiovascular:  Negative for palpitations, chest pain, orthopnea and PND.    Gastrointestinal: Negative for abdominal pain, nausea and vomiting.   Genitourinary: Negative for dysuria or hematuria.   Musculoskeletal: Positive for joint pain.   Skin: Negative for rash.   Neurological: Positive for dizziness. Negative for sensory change, speech change and focal weakness.     Consultants/Specialty  Cardiology  PCP: Kishore Price M.D.    Disposition  Inpatient    Quality Measures  Quality-Core Measures   Reviewed items::  Labs reviewed, EKG reviewed and Medications reviewed  Pruett catheter::  No Pruett  DVT prophylaxis pharmacological::  Warfarin (Coumadin)          Physical Exam       Vitals:    06/30/18 1445 06/30/18 1500 06/30/18 1515 06/30/18 1530   BP: 128/88 120/72 110/82 118/76   Pulse: 99 (!) 102 (!) 104 (!) 107   Resp: 16 16 16 16   Temp: 36.4 °C (97.6 °F)  36.7 °C (98 °F) 36.6 °C (97.8 °F)   TempSrc:       SpO2: 98% 99% 100% 98%   Weight:       Height:         Body mass index is 31.11 kg/m². Weight: 82.2 kg (181 lb 3.5 oz)  Oxygen Therapy:  Pulse Oximetry: 98 %, O2 (LPM): 2, O2 Delivery: Silicone Nasal Cannula    Physical Exam  Constitutional: She is oriented to person, place, and time. Not in any acute distress and appears comfortable. Pleasant, elderly, appears healthy and stated age.  HENT:   Head: Normocephalic and atraumatic.   Eyes: Conjunctivae normal, sclerae anicteric, no discharge.  Cardiovascular: No murmur/rub/gallop heard. Rate controlled irregularly irregular rhythm   Pulmonary/Chest: Effort normal and breath sounds normal. No adventitious sounds. No CVA tenderness.  Abdominal: Soft. Bowel sounds are normal. She exhibits no distension. There is no tenderness.   Musculoskeletal: She exhibits edema (2+ b/l LE edema) and tenderness to palpation over pretibial areas b/l.   Neurological: She is alert and oriented to person, place, and time.   Skin: Skin is warm, no erythema or lesions to exposed skin.  Psychiatric: Affect normal. Mood normal. Judgment and insight  normal.     Lab Data Review:     Recent Labs      06/29/18   1457  06/30/18   0359   SODIUM  139  138   POTASSIUM  4.3  4.2   CHLORIDE  101  105   CO2  24  24   BUN  27*  21   CREATININE  1.10  0.77   MAGNESIUM  2.0   --    CALCIUM  9.4  8.9       Recent Labs      06/29/18   1457  06/30/18   0359   ALTSGPT  14  12   ASTSGOT  26  16   ALKPHOSPHAT  77  63   TBILIRUBIN  0.3  0.4   GLUCOSE  167*  98       Recent Labs      06/29/18   1457  06/30/18   0359   RBC  4.82  4.51   HEMOGLOBIN  15.1  13.9   HEMATOCRIT  46.3  43.6   PLATELETCT  279  239   PROTHROMBTM  31.9*  32.8*   INR  3.13*  3.24*       Recent Labs      06/29/18   1457  06/30/18   0359   WBC  12.0*  10.0   NEUTSPOLYS  71.90  51.20   LYMPHOCYTES  20.30*  35.50   MONOCYTES  6.20  9.10   EOSINOPHILS  0.70  3.30   BASOPHILS  0.50  0.60   ASTSGOT  26  16   ALTSGPT  14  12   ALKPHOSPHAT  77  63   TBILIRUBIN  0.3  0.4           Assessment/Plan     * Persistent atrial fibrillation (HCC)- (present on admission)   Assessment & Plan    - s/p ablation 12/2017 and multiple cardioversions in the past  - CHADVASC 4  - on chronic anticoagulation with warfarin  - home meds also include atenolol, digoxin, dofetilide  - reports intolerance to dofetilide- had diarrhea   - Failed adenosine x 3-  6, 12, 12mg and cardioversion x 2 (120 J and 200 J)  - Repeat EKG showed HR in 160s, afib with RVR, QTc of 438 with no significant ST T wave changes.  - Dr. Perry evaluated patient in  ED, recommended to get patient admitted and per note will consult EP for possible AV izzy ablation and permanent pacemaker placement.  - continuing warfarin w/ goal INR 2-3 and continuing digoxin  - atenolol held overnight d/t pauses; cardiology to determine whether to continue  - dofetilide held overnight per pharmacy protocol; cardiology to determine whether to continue  - goal K>4, Mg >2          Elevated serum creatinine- (present on admission)   Assessment & Plan    - Cr at 1.1  - Baseline 0.4-0.8  -  likely pre renal   - no fluids as can precipitate pulm edema  - held home lisinopril and lasix  - ctm        Mild Leukocytosis- (present on admission)   Assessment & Plan    - WBC at 12k  - no fevers, CXR wnl  - likely reactive  - ctm        Hypotension- (present on admission)   Assessment & Plan    - Per patient, SBP in the 80s at home  - Since admission, SBP in the low 100s  - Held lisinopril given hypotension  - day team to consider restarting once BP improves         Bilateral lower extremity edema- (present on admission)   Assessment & Plan    - chronic  - worsening since the past 2 months per patient  - no clinical signs of fluid overload, BNP at 163, CXR showed no pulm vasc congestion  - likely 2/2 stasis  - continue home spironolactone  - held home lasix given mild increase in creatinine levels        Multiple drug allergies- (present on admission)   Assessment & Plan    - allergic to Amiodarone; Bactrim; Claritin-d; Clotrimazole; Metoprolol; Morphine; Qvar; Vibramycin; Atorvastatin ; Augmentin; Cipro xr; Diltiazem; Flecainide; Keflex; Mucinex; Tramadol.          Supratherapeutic INR- (present on admission)   Assessment & Plan    - INR on admission at 3.13  - pt reports compliance with warfarin  - no recent abx use  - Warfarin per pharmacy dosing  - Goal INR 2-3 for afib  - f/u INR am          Pre-syncope   Assessment & Plan    - this am while in the shower  - no falls/ LOC  - likely 2/2 tachycardia in the 160s  - resolved on admission  - fall precautions in place        Depression- (present on admission)   Assessment & Plan    - stable  - continue home sertraline        CAD (coronary artery disease)- (present on admission)   Assessment & Plan    - Cath 2012 showed 70% mid right coronary artery stenosis and a 40% proximal LAD stenosis.   - MPI 2016 showed EF of 75% with no ischemia  - currently stable; no angina. Trops neg x2.  - restarting moderate intensity statin and daily ASA as per 04/30 outpatient  cardiology note  - there is note in chart of myalgias with atorvastatin from 2015 though she was apparently on statin much more recently than that per cardiology notes; for now will start at moderate intensity (10 mg) per ACC/AHA guidelines given age, and she can be monitored as outpt for development of sx and/or CK elevation. Possible that she may be able to tolerate low-dose atorva; if not she can be switched to another statin.            Hypothyroidism- (present on admission)   Assessment & Plan    - TSH 0.810 today  - continue home synthyroid        Spinal stenosis, multilevel- (present on admission)   Assessment & Plan    - stable  - uses a walker at baseline to ambulate        Mixed hyperlipidemia- (present on admission)   Assessment & Plan    - Lipid panel 12/2017 at goal- Total chol 140, HDL 45, LDL 67,   - Not on statins  - Had myositis with atorvastatin in the past        -1

## 2018-06-30 NOTE — CARE PLAN
Problem: Communication  Goal: The ability to communicate needs accurately and effectively will improve  Outcome: PROGRESSING AS EXPECTED    Intervention: [place] patient and significant other/support system to call light to alert staff of needs   06/29/18 7915   OTHER   Oriented to: All of the Following : Location of Bathroom, Visiting Policy, Unit Routine, Call Light and Bedside Controls, Bedside Rail Policy, Smoking Policy, Rights and Responsibilities, Bedside Report, and Patient Education Notebook         Problem: Knowledge Deficit  Goal: Knowledge of disease process/condition, treatment plan, diagnostic tests, and medications will improve  Outcome: PROGRESSING AS EXPECTED  Patient educated about disease process and plan of care for the shift. Patient expressed understanding. All questions answered at this time.

## 2018-06-30 NOTE — ED NOTES
Pt tearful b/c muscles are jordan intermittently. Pain in feet and legs r/t this and edema. Medicated per MAR. Admitting MD at bedside.

## 2018-06-30 NOTE — ASSESSMENT & PLAN NOTE
- allergic to Amiodarone; Bactrim; Claritin-d; Clotrimazole; Metoprolol; Morphine; Qvar; Vibramycin; Atorvastatin ; Augmentin; Cipro xr; Diltiazem; Flecainide; Keflex; Mucinex; Tramadol.

## 2018-06-30 NOTE — ASSESSMENT & PLAN NOTE
- Cr at 1.1  - Baseline 0.4-0.8  - likely pre renal   - no fluids as can precipitate pulm edema  - held home lisinopril and lasix  - ctm

## 2018-06-30 NOTE — PROGRESS NOTES
Dr. Joseph updated on patients frequent 1.5 second pauses and 3 second arrest. Patient is in afib ranging from the low hundreds to the 160's. Orders received to call back if pauses become greater than or equal to 5 seconds. Orders also received to hold morning dose of atenolol.

## 2018-06-30 NOTE — PROGRESS NOTES
Report received from ED nurse. Patient up to floor with belongings. Monitor room called, patient in afib at a rate of 157. Patient encouraged to call before getting up. Bed locked and in lowest position. Bed alarm on.

## 2018-06-30 NOTE — ASSESSMENT & PLAN NOTE
- chronic  - worsening since the past 2 months per patient  - no clinical signs of fluid overload, BNP at 163, CXR showed no pulm vasc congestion  - likely 2/2 stasis  - continue home spironolactone  - held home lasix given mild increase in creatinine levels

## 2018-06-30 NOTE — ASSESSMENT & PLAN NOTE
- Per patient, SBP in the 80s at home  - Since admission, SBP in the low 100s  - Held lisinopril given hypotension  - day team to consider restarting once BP improves

## 2018-06-30 NOTE — H&P
Internal Medicine Admitting History and Physical    Note Author: Lana Joseph M.D.       Name Donte Magaña DOB 1940   Age/Sex 77 y.o. female   MRN [id number]   Code Status FULL CODE     After 5PM or if no immediate response to page, please call for cross-coverage  Attending/Team: Dr. Chapa/ Luisito See Patient List for primary contact information  Call [phone] to page    1st Call - Day Intern (R1):   Dr. Plata 2nd Call - Day Sr. Resident (R2/R3):   Dr. Milner       Chief Complaint:   Dizziness   Rapid heart rate    HPI:    Patient is a 76 y/o female with a PMH of atrial fibrillation s/p ablation 2017 and multiple cardioversions in the past,  on coumadin, CAD,  HTN,  HLD, SLE, hypothyroidism, depression, spinal stenosis and arthritis who presents to the ED with c/o dizziness and rapid heartbeat this morning.    Patient states that while she was trying to shower in the am, she felt hot and dizzy and felt like she was about to pass out. She sat down and turned the water to cold and felt a little better. Denies any LOC /fall. She reports checking her BP and HR immediately after her shower and noted that it was 87/55 and HR was 176 and thus decided to come to ED. Denies any chest pain, diaphoresis, sob, orthopnea, PND, nausea, vomiting or abdominal pain.    She was diagnosed with afib 8-9 years back and is on chronic anticoagulation with warfarin. She was recently ablated 2017 and felt fine till about a month back when she noticed that her HR was mostly in 150s-160s. Had a Holter on 18 which showed no Afib. She is also on digoxin, atenolol and dofetilide. Dofetilide was recently stopped 18 due to SE- had a diarrhea and rash. She is also allergic to flecainide, diltiazem, metoprolol and amiodarone.    In the ED, admission EKG showed , SVT with a QTc of 477  She failed adenosine x 3  6, 12,12mg and also failed cardioversion x 2 (120 J and 200 J)  Repeat EKG showed HR in 160s, afib  "with RVR, QTc of 438 with no significant ST T wave changes.  Dr. Perry saw the patient in the ED, recommended to get patient admitted and per note will consult EP for possible AV izzy ablation and permanent pacemaker placement. Recs to continue warfarin, digoxin, dofetilide and atenolol.    Labs: Mild leukocytosis at 12, trop 0.02, , K 4.3, Mg 2, AG 14, BUN/Cr 27/1.1, INR 3.13  CXR showed no acute cardiopulmonary process.    Review of Systems   Constitutional: Positive for malaise/fatigue. Negative for chills, diaphoresis and fever.   HENT: Negative for congestion and sore throat.    Eyes: Negative for blurred vision.   Respiratory: Negative for cough, hemoptysis and shortness of breath.    Cardiovascular: Positive for palpitations and leg swelling. Negative for chest pain, orthopnea and PND.   Gastrointestinal: Negative for abdominal pain, nausea and vomiting.   Genitourinary: Negative for dysuria.   Musculoskeletal: Positive for joint pain.   Skin: Negative for rash.   Neurological: Positive for dizziness. Negative for sensory change, speech change and focal weakness.             Past Medical History (Chronic medical problem, known complications and current treatment)      Past Medical History:   Diagnosis Date   • A-fib (HCC)    • Anesthesia     \"Tachycardia for 5 days after cataract surgery\"   • Anticoagulant long-term use 1/12/2012   • Arthritis     Knees, hips   • Asthma     with pneumonia, nothing for 3 years   • Atrial fibrillation (HCC)    • Backpain    • Breath shortness     with exertion O2  2l/m PRN, hasnt used for 3 years   • Bronchitis Nov, 2013   • CAD (coronary artery disease)    • Depression    • Glaucoma 5/3/2011   • Hematoma complicating a procedure 11/3/2012   • High cholesterol    • Hypertension    • Hypothyroid    • Lupus    • Macular degeneration    • Menopause 1/12/2012   • Mitral regurgitation 10/30/2012   • Obesity 1/12/2012   • Pneumonia feb,2013   • Pre-syncope 6/29/2018   • " Pulmonary hypertension (HCC) 10/30/2012   • PVC's (premature ventricular contractions) 1/12/2012   • Senile nuclear sclerosis    • Spinal stenosis of lumbar region at multiple levels    • Unspecified cataract     repaired bilateral   • Unspecified urinary incontinence          Past Surgical History:  Past Surgical History:   Procedure Laterality Date   • KNEE ARTHROPLASTY TOTAL Right 6/23/2016    Procedure: KNEE ARTHROPLASTY TOTAL;  Surgeon: Heriberto Lozada M.D.;  Location: SURGERY [facility];  Service:    • KNEE ARTHROPLASTY TOTAL Left 5/28/2015    Procedure: KNEE ARTHROPLASTY TOTAL;  Surgeon: Heriberto Lozada M.D.;  Location: SURGERY [facility];  Service:    • CATARACT PHACO WITH IOL Right 5/5/2015    Procedure: IOL OD - STANDARD;  Surgeon: Dmitry Bejarano M.D.;  Location: SURGERY [facility];  Service:    • CATARACT PHACO WITH IOL  4/21/2015    Performed by Dmitry Bejarano M.D. at [facility]   • RECOVERY  11/30/2010    Performed by SURGERY, CATH-RECOVERY at SURGERY SAME DAY [facility] ORS   • COLONOSCOPY  2008    Normal    [facility]   • ABDOMINAL HYSTERECTOMY TOTAL  April 15,1975    still has ovaries   • OPEN REDUCTION      left ankle   • OTHER      Removed pins from left ankle   • TONSILLECTOMY AND ADENOIDECTOMY         Current Outpatient Medications:  Home Medications     Reviewed by Sabrina Underwood, Pharmacy Intern (Pharmacy Intern) on 06/29/18 at 1759  Med List Status: Complete   Medication Last Dose Status   acetaminophen (TYLENOL) 500 MG TABS 6/29/2018 Active   atenolol (TENORMIN) 25 MG Tab 6/29/2018 Active   digoxin (LANOXIN) 125 MCG Tab 6/29/2018 Active   dofetilide (TIKOSYN) 500 MCG Cap  Active   estradiol (ESTRACE) 2 MG Tab 6/29/2018 Active   furosemide (LASIX) 40 MG Tab 6/29/2018 Active   levothyroxine (SYNTHROID) 50 MCG Tab 6/29/2018 Active   lisinopril (PRINIVIL) 5 MG Tab 6/29/2018 Active   magnesium oxide (MAG-OX) 400 (241.3 Mg) MG Tab tablet 6/29/2018 Active  "  Multiple Vitamins-Minerals (ICAPS AREDS 2) Cap 6/29/2018 Active   raNITidine (ZANTAC) 150 MG Tab 6/28/2018 Active   sertraline (ZOLOFT) 25 MG tablet 6/28/2018 Active   spironolactone (ALDACTONE) 50 MG Tab 6/29/2018 Active   vitamin D (CHOLECALCIFEROL) 1000 UNIT TABS 6/29/2018 Active   warfarin (COUMADIN) 2.5 MG Tab 6/28/2018 Active                Medication Allergy/Sensitivities:  Allergies   Allergen Reactions   • Amiodarone Hives     Throat and tongue itching   • Bactrim Shortness of Breath   • Claritin-D [Loratadine-Pseudoephedrine] Palpitations   • Clotrimazole      Stinging / burning on chest   • Metoprolol      Causes throat swelling   • Morphine      Hallucinations   • Qvar [Beclomethasone Dipropionate]      Pressure on heart     • Vibramycin Shortness of Breath   • Atorvastatin Calcium-Polysorbate 80      Muscle aches     • Augmentin      Unknown reaction   • Cipro Xr Swelling   • Diltiazem      rash   • Flecainide      dizziness   • Keflex Unspecified     Pt states \"Unsure\".   • Mucinex      GI Distress     • Tramadol      crying   • Tape Rash     Paper tape okay         Family History (mandatory)   Family History   Problem Relation Age of Onset   • Stroke Mother    • Diabetes Father    • Stroke Sister    • Heart Disease Brother    • Stroke Sister    • GI Daughter      Crohn's Disease   • Heart Disease Daughter      CHF   • Other Daughter      Chronic Pain--Lymphedema   • Cancer Paternal Aunt    Reviewed     Social History (mandatory)   Social History     Social History   • Marital status:      Spouse name: N/A   • Number of children: N/A   • Years of education: N/A     Occupational History   • Not on file.     Social History Main Topics   • Smoking status: Never Smoker   • Smokeless tobacco: Never Used   • Alcohol use No   • Drug use: No   • Sexual activity: Not Currently     Partners: Male     Birth control/ protection: Post-Menopausal     Other Topics Concern   • Not on file     Social History " "Narrative   • No narrative on file   Reviewed    Living situation: Lives with    PCP : Kishore Price M.D.        Physical Exam     Vitals:    06/29/18 2020 06/29/18 2030 06/29/18 2135 06/29/18 2325   BP:   132/68    Pulse: (!) 142 (!) 125 (!) 157    Resp:   18    Temp:   36.3 °C (97.4 °F)    TempSrc:       SpO2: 99% 98% 94% 99%   Weight:   82.2 kg (181 lb 3.5 oz)    Height:   1.626 m (5' 4\")      Body mass index is 31.11 kg/m².  /68 Comment: manual  Pulse (!) 157   Temp 36.3 °C (97.4 °F)   Resp 18   Ht 1.626 m (5' 4\")   Wt 82.2 kg (181 lb 3.5 oz)   LMP 01/01/1993   SpO2 99%   Breastfeeding? No   BMI 31.11 kg/m²   O2 therapy: Pulse Oximetry: 99 %, O2 (LPM): 2, O2 Delivery: None (Room Air)    Physical Exam   Constitutional: She is oriented to person, place, and time.   Not in any acute distress   HENT:   Head: Normocephalic and atraumatic.   Eyes: EOM are normal. Pupils are equal, round, and reactive to light.   Neck: Normal range of motion.   Cardiovascular:   No murmur heard.  Tachycardia, irregularly irregular rhythm   Pulmonary/Chest: Effort normal and breath sounds normal.   Abdominal: Soft. Bowel sounds are normal. She exhibits no distension. There is no tenderness.   Musculoskeletal: She exhibits edema (2+ b/l LE edema).   Neurological: She is alert and oriented to person, place, and time.   Skin: Skin is warm.   Psychiatric: Affect normal.         Data Review       Old Records Request:   Completed  Current Records review/summary: Completed    Lab Data Review:  Recent Results (from the past 24 hour(s))   TSH (Thyroid Stimulating Hormone)    Collection Time: 06/29/18  2:17 PM   Result Value Ref Range    TSH 0.810 0.380 - 5.330 uIU/mL   CBC w/ Differential    Collection Time: 06/29/18  2:57 PM   Result Value Ref Range    WBC 12.0 (H) 4.8 - 10.8 K/uL    RBC 4.82 4.20 - 5.40 M/uL    Hemoglobin 15.1 12.0 - 16.0 g/dL    Hematocrit 46.3 37.0 - 47.0 %    MCV 96.1 81.4 - 97.8 fL    MCH " 31.3 27.0 - 33.0 pg    MCHC 32.6 (L) 33.6 - 35.0 g/dL    RDW 50.6 (H) 35.9 - 50.0 fL    Platelet Count 279 164 - 446 K/uL    MPV 10.3 9.0 - 12.9 fL    Neutrophils-Polys 71.90 44.00 - 72.00 %    Lymphocytes 20.30 (L) 22.00 - 41.00 %    Monocytes 6.20 0.00 - 13.40 %    Eosinophils 0.70 0.00 - 6.90 %    Basophils 0.50 0.00 - 1.80 %    Immature Granulocytes 0.40 0.00 - 0.90 %    Nucleated RBC 0.00 /100 WBC    Neutrophils (Absolute) 8.62 (H) 2.00 - 7.15 K/uL    Lymphs (Absolute) 2.44 1.00 - 4.80 K/uL    Monos (Absolute) 0.75 0.00 - 0.85 K/uL    Eos (Absolute) 0.09 0.00 - 0.51 K/uL    Baso (Absolute) 0.06 0.00 - 0.12 K/uL    Immature Granulocytes (abs) 0.05 0.00 - 0.11 K/uL    NRBC (Absolute) 0.00 K/uL   Complete Metabolic Panel (CMP)    Collection Time: 06/29/18  2:57 PM   Result Value Ref Range    Sodium 139 135 - 145 mmol/L    Potassium 4.3 3.6 - 5.5 mmol/L    Chloride 101 96 - 112 mmol/L    Co2 24 20 - 33 mmol/L    Anion Gap 14.0 (H) 0.0 - 11.9    Glucose 167 (H) 65 - 99 mg/dL    Bun 27 (H) 8 - 22 mg/dL    Creatinine 1.10 0.50 - 1.40 mg/dL    Calcium 9.4 8.5 - 10.5 mg/dL    AST(SGOT) 26 12 - 45 U/L    ALT(SGPT) 14 2 - 50 U/L    Alkaline Phosphatase 77 30 - 99 U/L    Total Bilirubin 0.3 0.1 - 1.5 mg/dL    Albumin 3.8 3.2 - 4.9 g/dL    Total Protein 7.2 6.0 - 8.2 g/dL    Globulin 3.4 1.9 - 3.5 g/dL    A-G Ratio 1.1 g/dL   Btype Natriuretic Peptide    Collection Time: 06/29/18  2:57 PM   Result Value Ref Range    B Natriuretic Peptide 163 (H) 0 - 100 pg/mL   Troponin STAT    Collection Time: 06/29/18  2:57 PM   Result Value Ref Range    Troponin I 0.02 0.00 - 0.04 ng/mL   PT/INR    Collection Time: 06/29/18  2:57 PM   Result Value Ref Range    PT 31.9 (H) 12.0 - 14.6 sec    INR 3.13 (H) 0.87 - 1.13   ESTIMATED GFR    Collection Time: 06/29/18  2:57 PM   Result Value Ref Range    GFR If  58 (A) >60 mL/min/1.73 m 2    GFR If Non  48 (A) >60 mL/min/1.73 m 2   MAGNESIUM    Collection Time:  18  2:57 PM   Result Value Ref Range    Magnesium 2.0 1.5 - 2.5 mg/dL   EKG (NOW)    Collection Time: 18  3:48 PM   Result Value Ref Range    Report       [facility]    Test Date:  2018  Pt Name:    STEPHANIE DAVENPORT                 Department: ER  MRN:        [id number]                      Room:       [facility]  Gender:     Female                       Technician: [id number]  :        [id number]                   Requested By:ER TRIAGE PROTOCOL  Order #:    [id number]                    Reading MD:    Measurements  Intervals                                Axis  Rate:       160                          P:          0  NJ:         116                          QRS:        -29  QRSD:       68                           T:          56  QT:         292  QTc:        477    Interpretive Statements  SUPRAVENTRICULAR TACHYCARDIA  BORDERLINE LEFT AXIS DEVIATION  BORDERLINE LOW VOLTAGE IN FRONTAL LEADS  ST DEPRESSION, PROBABLY RATE RELATED  Compared to ECG 2018 15:59:14  ST (T wave) deviation now present  Atrial fibrillation no longer present  Early repolarization no longer present  Prolonged QT interval no longer p resent         Imaging/Procedures Review:    Independant Imaging Review: Completed  DX-CHEST-PORTABLE (1 VIEW)   Final Result      1.  There is no acute cardiopulmonary process.   2.  There is apparent artifact due to something on or under the patient causing nodular densities in both upper hemithoraces.           EKG:   EKG Independant Review: Completed  QTc:477, HR: 160, SVT with ST depressions  V2-V6    Records reviewed and summarized in current documentation :  Yes  [employer] teaching service handout given to patient:  No         Assessment/Plan     Persistent atrial fibrillation (HCC)- (present on admission)   Assessment & Plan    - s/p ablation 2017 and multiple cardioversions in the past  - CHADVASC 4  - on chronic anticoagulation with warfarin  - home meds also  include atenolol,, digoxin, dofetilide  - reports intolerance to dofetilide- had diarrhea   - c/o persistent tachycardia in 150s-160s since the past month  - Holter 6/5/18 showed no Afib in 48hrs  - Echo 12/2017 showed EF of 50-55%   - In the ED, admission EKG showed , SVT with a QTc of 477  - Failed adenosine x 3-  6, 12, 12mg and cardioversion x 2 (120 J and 200 J)  - Repeat EKG showed HR in 160s, afib with RVR, QTc of 438 with no significant ST T wave changes.  - Dr. Perry evaluated patient in  ED, recommended to get patient admitted and per note will consult EP for possible AV izzy ablation and permanent pacemaker placement.  Plan:  - Admit to medical floor with telemetry and continuous pulse oximetry  - Continue warfarin per pharmacy, digoxin, dofetilide and atenolol per cards recs  - NPO at midnight for possible intervention in the am by cards  - day team to consider repeat echo for rate related cardiomyopathy          Elevated serum creatinine- (present on admission)   Assessment & Plan    - Cr at 1.1  - Baseline 0.4-0.8  - likely pre renal   - no fluids as can precipitate pulm edema  - held home lisinopril and lasix  - ctm        Mild Leukocytosis- (present on admission)   Assessment & Plan    - WBC at 12k  - no fevers, CXR wnl  - likely reactive  - ctm        Hypotension- (present on admission)   Assessment & Plan    - Per patient, SBP in the 80s at home  - Since admission, SBP in the low 100s  - Held lisinopril given hypotension  - day team to consider restarting once BP improves         Bilateral lower extremity edema- (present on admission)   Assessment & Plan    - chronic  - worsening since the past 2 months per patient  - no clinical signs of fluid overload, BNP at 163, CXR showed no pulm vasc congestion  - likely 2/2 stasis  - continue home spironolactone  - held home lasix given mild increase in creatinine levels        Multiple drug allergies- (present on admission)   Assessment & Plan    -  allergic to Amiodarone; Bactrim; Claritin-d; Clotrimazole; Metoprolol; Morphine; Qvar; Vibramycin; Atorvastatin ; Augmentin; Cipro xr; Diltiazem; Flecainide; Keflex; Mucinex; Tramadol.          Supratherapeutic INR- (present on admission)   Assessment & Plan    - INR on admission at 3.13  - pt reports compliance with warfarin  - no recent abx use  - Warfarin per pharmacy dosing  - Goal INR 2-3 for afib  - f/u INR am          Pre-syncope   Assessment & Plan    - this am while in the shower  - no falls/ LOC  - likely 2/2 tachycardia in the 160s  - resolved on admission  - fall precautions in place        Depression- (present on admission)   Assessment & Plan    - stable  - continue home sertraline        CAD (coronary artery disease)- (present on admission)   Assessment & Plan    - Cath 2012 showed 70% mid right coronary artery stenosis and a 40% proximal LAD stenosis.   - MPI 2016 showed EF of 75% with no ischemia  - currently stable  - denies chest pain, sob, diaphoresis or jaw pain  - Trop 0.02  - EKG showed SVT with ST depressions V2- V6  - f/u repeat trops          Hypothyroidism- (present on admission)   Assessment & Plan    - TSH 0.810 today  - continue home synthyroid        Spinal stenosis, multilevel- (present on admission)   Assessment & Plan    - stable  - uses a walker at baseline to ambulate        Mixed hyperlipidemia- (present on admission)   Assessment & Plan    - Lipid panel 12/2017 at goal- Total chol 140, HDL 45, LDL 67,   - Not on statins  - Had myositis with atorvastatin in the past            Anticipated Hospital stay:  >2 midnights       Quality Measures  Quality-Core Measures   Reviewed items::  EKG reviewed, Labs reviewed and Medications reviewed  Pruett catheter::  No Pruett  DVT prophylaxis pharmacological::  Warfarin (Coumadin)    PCP: Kishore Price M.D.

## 2018-06-30 NOTE — PROGRESS NOTES
Inpatient Anticoagulation Service Note    Date: 6/30/2018  Reason for Anticoagulation: Atrial Fibrillation      Hemoglobin Value: 15.1  Hematocrit Value: 46.3  Lab Platelet Value: 279  Target INR: 2.0 to 3.0    INR from last 7 days     Date/Time INR Value    06/29/18 1457 (!)  3.13        Dose from last 7 days     Date/Time Dose (mg)    06/30/18 0000  0        Average Dose (mg):  3.75 mg on M and W, 2.5 mg all other days of the week   Significant Interactions: Thyroid Medications  Bridge Therapy: No     Reversal Agent Administered: Not Applicable    Comments: 76 y/o female on home warfarin for AFib who is followed by the [facility] anticoagulation clinic.  INR today is supratherapeutic, but H/H is stable with no overt sxs of bleeding per chart review.     Plan:  Will hold dose on 6/29 and will obtain INR with AM labs on 6/30. Pharmacy will continue to follow.        Pharmacist suggested discharge dosing: TBD based on subsequent INR results     Toshia Washington, PharmD, BCPS

## 2018-06-30 NOTE — OP REPORT
Electrophysiology Procedure Note  [facility]    PROCEDURE PERFORMED: DDDR PPM, moderate sedation administered by RN and supervised by physician, DC cardioversion    : Bo Matthews MD    ASSISTANT: none    ANESTHESIA: Moderate sedation,  start time 1:18 pm, stop time 2:19 pm  the moderate sedation document has been reviewed, signed and scanned into media     EBL: 30 cc    SPECIMENS: None    INDICATION: A fib/AT with RVR and post conversion pauses    PRE PROCEDURE ECG:  BPM with variable respnse    POST PROCEDURE ECG:  BPM with variable response     COMPLICATIONS:  none    DESCRIPTION OF PROCEDURE:  After informed written consent, the patient was brought to the electrophysiology lab in the fasting, unsedated state. The patient was prepped and draped in the usual sterile fashion. The procedure was performed under moderate sedation with local anesthetic.   A left infraclavicular incision was made with a scalpel and the pectoral device pocket was created using a combination of blunt dissection and electrocautery. The modified Seldinger technique was used to gain access to the left axillary vein. A peel-away hemostasis sheath was placed in the vein. Under fluoroscopic guidance, the pacemaker leads were introduced into the heart. The ventricular lead was advanced to the RVOT and then lowered into position at the RV apex. The atrial lead was positioned in the Right atrial appendage. The leads were tested and had satisfactory sensing and pacing parameters. High output ventricular pacing did not produce extracardiac stimulation. The leads were sutured to the underlying pectoral muscle with interrupted non absorbable suture over a silastic suture sleeve. The device pocket was irrigated with antibiotic solution, inspected, and no bleeding was seen. The leads were connected to the DDDR PPM pulse generator and the device was inserted into the pocket The wound was closed with three layers of  absorbable sutures. Attempted pace conversion of AT would causes conversion and recurrence. DC cardioversion also performed with similar results.  I personally supervised the administration of moderate sedation by the RN and observed the level of consciousness and physiologic status throughout the procedure.   Following recovery from sedation, the patient was transferred to a monitored bed in good condition.     IMPLANTED DEVICE INFORMATION:  Pulse generator is a [employer] model L311  Serial # [id number]    LEAD INFORMATION:  1)Right atrial lead is a [employer] model # 7740 , serial # [id number] ,P wave 1.5 millivolts, threshold 1.5 Volts, pacing impedance 644 Ohms.    2)Right ventricular lead is a [employer] model # 7741 , serial # [id number] ,R wave 16 millivolts, threshold 0.5 Volts, pacing impedance 906 Ohms.    DEVICE PROGRAMMING:  DDIR    FLUOROSCOPY TIME: 1.8  min    IMPRESSIONS:  1. Successful Dual chamber PPM implantation    RECOMMENDATIONS:  1. Transfer to monitored bed  2. PA and lateral chest x-ray  3. Device interrogation prior to hospital discharge  4. Followup in device clinic.  5. Start beta blockers.  6. Consider AT RFA plus or minus AV node RFA.

## 2018-06-30 NOTE — PROGRESS NOTES
Patient back in room from procedure. A&O X 4, post procedure vitals started. VS'S. Currently on 2 L via NC will titrate off once patient is more alert. Pt placed back on telemetry monitor still in A. Fib. Pacemaker site CD&I, left arm in sling. Pt reminded to limit left arm movement and not to lift above shoulder.

## 2018-06-30 NOTE — ASSESSMENT & PLAN NOTE
- Lipid panel 12/2017 at goal- Total chol 140, HDL 45, LDL 67,   - Not on statins  - Had myositis with atorvastatin in the past

## 2018-06-30 NOTE — ASSESSMENT & PLAN NOTE
- INR on admission at 3.13  - pt reports compliance with warfarin  - no recent abx use  - Warfarin per pharmacy dosing  - Goal INR 2-3 for afib  - f/u INR am

## 2018-06-30 NOTE — ASSESSMENT & PLAN NOTE
- Cath 2012 showed 70% mid right coronary artery stenosis and a 40% proximal LAD stenosis.   - MPI 2016 showed EF of 75% with no ischemia  - currently stable; no angina. Trops neg x2.  - restarting moderate intensity statin and daily ASA as per 04/30 outpatient cardiology note  - there is note in chart of myalgias with atorvastatin from 2015 though she was apparently on statin much more recently than that per cardiology notes; for now will start at moderate intensity (10 mg) per ACC/AHA guidelines given age, and she can be monitored as outpt for development of sx and/or CK elevation. Possible that she may be able to tolerate low-dose atorva; if not she can be switched to another statin.

## 2018-06-30 NOTE — ED NOTES
Med rec complete per patient's  with a list from home.  Allergies reviewed.  Pt had morning doses this am.  Pt stopped taking tikosyn on 6/20 per MD.  No antibiotics in last 30 days.

## 2018-06-30 NOTE — SENIOR ADMIT NOTE
Senior Admission Note    In summary: Donte Magaña is a 77 y.o. female with past medical history of atrial fibrillation for a long time, s/p ablation procedure last December, presented to the ER with presyncope, lightheadedness and weakness, due to uncontrol rate of Afib, blood pressure dropped to 80s/50s during these episodes, she is a patient of Dr. Lizzy Haywood, in the ED she had adenosine 6 mg followed 12 for two times then 2 unsuccessful cardioversion's attempts. Admitted for electrophysiologist evaluation in the morning.      Pertinent physical exam findings: Bilateral leg swelling, irregular irregular heart rhythm without murmur,clear chest, normal blood pressure    Pertinent studies: Normal TSH, Afib with RVR, normal troponin, mild elevated BNP, mild increase in creatinine     Assessment and plan in summary:  1. Persistent or permanent A-fib with RVR  2. Supratherapeutic INR       - failed multiple treatment options and ablation procedure in the past      - wait for the EP to eval the patient for possible pacemaker placement with izzy ablation       - persistent tachycardia with heart rate 120s- 160s, unlikely will respond to cardioversion       - on warfarin will continue per pharmacy (hold today) that, its ok by cardiology to continue even if she needs the pacemaker      - continue antiarrhythmic meds and av blocker for now per cardiology recommendation       - hold lasix for increase creatinine to from 0.8 to 1.1      - hold lisinopril for blood pressure and creatinine too       - resume the spironolactone and digoxin ( I am not to concern with the mild increase in creatinine at this point, may remove these meds it if next lab showed further worsening in renal function) we already stopped the lasix and ACE for now    For full plan, please see Intern note for details   Kaitlynn Woo M.D.  PGY 2

## 2018-06-30 NOTE — PROGRESS NOTES
Cardiology Progress Note               Author: Aida To Date & Time created: 2018  2:22 PM     Interval History:  + intermittent pauses overnight up to 3.5 seconds.    Chief Complaint:  Persistent atrial fibrillation, pre-syncope.    Review of Systems   Constitutional: Negative for chills, fever, malaise/fatigue and weight loss.   HENT: Negative for ear discharge, ear pain, hearing loss and nosebleeds.    Eyes: Negative for blurred vision, double vision, pain and discharge.   Respiratory: Negative for cough and shortness of breath.    Cardiovascular: Negative for chest pain, palpitations, orthopnea, claudication, leg swelling and PND.   Gastrointestinal: Negative for abdominal pain, blood in stool, melena, nausea and vomiting.   Genitourinary: Negative for dysuria and hematuria.   Musculoskeletal: Negative for falls, joint pain and myalgias.   Skin: Negative for itching and rash.   Neurological: Negative for dizziness, sensory change, speech change, loss of consciousness and headaches.   Endo/Heme/Allergies: Negative for environmental allergies. Does not bruise/bleed easily.   Psychiatric/Behavioral: Negative for depression, hallucinations and suicidal ideas.       Physical Exam   Constitutional: She is oriented to person, place, and time. No distress.   HENT:   Head: Normocephalic and atraumatic.   Eyes: Right eye exhibits no discharge. Left eye exhibits no discharge.   Neck: No JVD present.   Cardiovascular: Exam reveals no friction rub.    No murmur heard.  There is presence of an irregularly irregular heartbeats.     Pulmonary/Chest: No respiratory distress.   Abdominal: She exhibits no distension. There is no tenderness.   Musculoskeletal: She exhibits no edema or tenderness.   Neurological: She is alert and oriented to person, place, and time.   Skin: Skin is warm and dry.   Psychiatric: She has a normal mood and affect.       Hemodynamics:  Temp (24hrs), Av.6 °C (97.9 °F), Min:36.3 °C (97.4  °F), Max:36.8 °C (98.2 °F)  Temperature: 36.8 °C (98.2 °F)  Pulse  Av.9  Min: 96  Max: 167Heart Rate (Monitored): (!) 136  Blood Pressure : 130/78, NIBP: 119/74     Respiratory:    Respiration: 16, Pulse Oximetry: 97 %     Work Of Breathing / Effort: Mild  RUL Breath Sounds: Clear, RML Breath Sounds: Clear, RLL Breath Sounds: Diminished, DO Breath Sounds: Clear, LLL Breath Sounds: Diminished  Fluids:     Weight: 82.2 kg (181 lb 3.5 oz)  GI/Nutrition:  Orders Placed This Encounter   Procedures   • Diet NPO: at Midnight, Sips with Medications     Standing Status:   Standing     Number of Occurrences:   1     Order Specific Question:   Restrict to:     Answer:   Sips with Medications [3]     Lab Results:  Recent Labs      18   1457  18   0359   WBC  12.0*  10.0   RBC  4.82  4.51   HEMOGLOBIN  15.1  13.9   HEMATOCRIT  46.3  43.6   MCV  96.1  96.7   MCH  31.3  30.8   MCHC  32.6*  31.9*   RDW  50.6*  52.0*   PLATELETCT  279  239   MPV  10.3  10.2     Recent Labs      18   1457  18   0359   SODIUM  139  138   POTASSIUM  4.3  4.2   CHLORIDE  101  105   CO2  24  24   GLUCOSE  167*  98   BUN  27*  21   CREATININE  1.10  0.77   CALCIUM  9.4  8.9     Recent Labs      18   1457  18   0359   INR  3.13*  3.24*     Recent Labs      18   145   BNPBTYPENAT  163*     Recent Labs      18   1457  18   0359   TROPONINI  0.02  0.01   BNPBTYPENAT  163*   --      Recent Labs      18   0359   TRIGLYCERIDE  307*   HDL  32*   LDL  47         Medical Decision Making, by Problem:  Active Hospital Problems    Diagnosis   • Persistent atrial fibrillation (HCC) [I48.1]   • Pre-syncope [R55]   • Supratherapeutic INR [R79.1]   • Multiple drug allergies [Z88.9]   • Bilateral lower extremity edema [R60.0]   • Hypotension [I95.9]   • Mild Leukocytosis [D72.829]   • Elevated serum creatinine [R79.89]   • Depression [F32.9]   • CAD (coronary artery disease) [I25.10]   • Hypothyroidism  [E03.9]   • Spinal stenosis, multilevel [M48.00]   • Mixed hyperlipidemia [E78.2]       Plan:    PPM with EP for tachy-wilfredo syndrome.  AV node ablation in future with EP (elective).    Aida Perry M.D.      Quality-Core Measures   Reviewed items::  EKG reviewed, Radiology images reviewed, Labs reviewed and Medications reviewed

## 2018-06-30 NOTE — PROGRESS NOTES
Monitor Summary  Afib 108-139 touching up to 180 with ambulation. (R) PVC, (F) 1.5 second pauses, and two 3 second arrests. MD aware.   -/.08/-

## 2018-06-30 NOTE — PROGRESS NOTES
Bedside report received. Per night RN patient was having frequent pauses on telemetry monitor, with the longest being 3 seconds. Patient A&O x 4. VS'S. Currently on 2 L via NC which is baseline per pt. No complains of pain at this time. Pt has been NPO since Midnight, sips with Meds POC discussed with patient. Pt verbalizes understanding. Call light and belongings with in reach. Bed locked and in lowest position, alarm and fall precautions in place.

## 2018-06-30 NOTE — PROGRESS NOTES
Inpatient Anticoagulation Service Note  Date: 6/30/2018  Reason for Anticoagulation: Atrial Fibrillation   Hemoglobin Value: 13.9  Hematocrit Value: 43.6  Lab Platelet Value: 239  Target INR: 2.0 to 3.0  INR from last 7 days     Date/Time INR Value    06/30/18 0359 (!)  3.24    06/29/18 1457 (!)  3.13        Dose from last 7 days     Date/Time Dose (mg)    06/30/18 0900  0.5    06/30/18 0000  0        Average Dose (mg): TBD (Home Dose: 3.75 mg Mo and 2.5 mg ROW per [facility])  Significant Interactions: Thyroid Medications, Other (Comments) (spironolactone, SSRI)  Bridge Therapy: No   Reversal Agent Administered: Not Applicable  Comments: INR above goal and slightly increased. H/H stable. PLT stable. No overt bleeding noted. Prior warfarin dosing noted. Warfarin interactions noted. Will give reduced dose and trend INR with AM labs. May need dose adjustments.    Plan:  Warfarin 0.5 mg 6/30/18  Education Material Provided?: No (chronic warfarin patient)  Pharmacist suggested discharge dosing: warfarin 3.75 mg Mo and 2.5 mg all other days    Dmitry Wolfe, PharmD

## 2018-06-30 NOTE — CONSULTS
Cardiology Consult Note:    Aida To  Date & Time note created:    6/29/2018   5:22 PM     Referring MD:  Dr. Qureshi    Patient ID:   Name:             Donte Magaña   YOB: 1940  Age:                 77 y.o.  female   MRN:               [id number]                                                             Chief Complaint / Reason for consult:  Persistent atrial fibrillation    History of Present Illness:    This is a 77 years old woman with chronic atrial fibrillation, on Tikosyn therapy and atenolol along with Coumadin for anticoagulation, presented to the hospital with symptomatic atrial fibrillation and rapid ventricular rate.  Patient felt lightheaded this morning while she was in the shower.  She felt her heart was palpitating.  She called cardiology office and was asked to come to the ER for further evaluation.  In the ER, cardioversion was attempted initially with 120 J which failed.  Repeated cardioversion with 200 J also failed.    No chest pain.  No shortness of breath.  + Presyncope.    Telemetry and EKG showed atrial fibrillation with rapid ventricular rate.    No family history of sudden cardiac death.    Relatively normal left ventricular systolic function with recent transthoracic echocardiogram in December 2017.    Review of Systems:      Constitutional: Denies fevers, Denies weight changes  Eyes: Denies changes in vision, no eye pain  Ears/Nose/Throat/Mouth: Denies nasal congestion or sore throat   Cardiovascular: no chest pain, yes palpitations   Respiratory: no shortness of breath , Denies cough  Gastrointestinal/Hepatic: Denies abdominal pain, nausea, vomiting, diarrhea, constipation or GI bleeding   Genitourinary: Denies dysuria or frequency  Musculoskeletal/Rheum: Denies  joint pain and swelling   Skin: Denies rash  Neurological: Denies headache, confusion, memory loss or focal weakness/parasthesias  Psychiatric: denies mood disorder   Endocrine: Kaylan thyroid  "problems  Heme/Oncology/Lymph Nodes: Denies enlarged lymph nodes, denies brusing or known bleeding disorder  All other systems were reviewed and are negative (AMA/CMS criteria)                Past Medical History:   Past Medical History:   Diagnosis Date   • A-fib (HCC)    • Anesthesia     \"Tachycardia for 5 days after cataract surgery\"   • Anticoagulant long-term use 1/12/2012   • Arthritis     Knees, hips   • Asthma     with pneumonia, nothing for 3 years   • Atrial fibrillation (HCC)    • Backpain    • Breath shortness     with exertion O2  2l/m PRN, hasnt used for 3 years   • Bronchitis Nov, 2013   • CAD (coronary artery disease)    • Depression    • Glaucoma 5/3/2011   • Hematoma complicating a procedure 11/3/2012   • High cholesterol    • Hypertension    • Hypothyroid    • Lupus    • Macular degeneration    • Menopause 1/12/2012   • Mitral regurgitation 10/30/2012   • Obesity 1/12/2012   • Pneumonia feb,2013   • Pre-syncope 6/29/2018   • Pulmonary hypertension (HCC) 10/30/2012   • PVC's (premature ventricular contractions) 1/12/2012   • Senile nuclear sclerosis    • Spinal stenosis of lumbar region at multiple levels    • Unspecified cataract     repaired bilateral   • Unspecified urinary incontinence      Active Hospital Problems    Diagnosis   • Pre-syncope [R55]   • Persistent atrial fibrillation (HCC) [I48.1]       Past Surgical History:  Past Surgical History:   Procedure Laterality Date   • KNEE ARTHROPLASTY TOTAL Right 6/23/2016    Procedure: KNEE ARTHROPLASTY TOTAL;  Surgeon: Heriberto Lozada M.D.;  Location: [facility];  Service:    • KNEE ARTHROPLASTY TOTAL Left 5/28/2015    Procedure: KNEE ARTHROPLASTY TOTAL;  Surgeon: Heriberto Lozada M.D.;  Location: [facility];  Service:    • CATARACT PHACO WITH IOL Right 5/5/2015    Procedure: IOL OD - STANDARD;  Surgeon: Dmitry Bejarano M.D.;  Location: [facility];  Service:    • CATARACT PHACO WITH IOL  4/21/2015    " Performed by Dmityr Bejarano M.D. at [facility]   • RECOVERY  11/30/2010    Performed by SURGERY, CATH-RECOVERY at [facility]   • COLONOSCOPY  2008    Normal    [facility]   • ABDOMINAL HYSTERECTOMY TOTAL  April 15,1975    still has ovaries   • OPEN REDUCTION      left ankle   • OTHER      Removed pins from left ankle   • TONSILLECTOMY AND ADENOIDECTOMY         Hospital Medications:    Current Facility-Administered Medications:   •  midazolam (VERSED) injection 4 mg, 4 mg, Intravenous, Once, Agusto Valladares D.O.  •  midazolam (VERSED) injection 2 mg, 2 mg, Intravenous, Once, Agusto Valladares D.O.  •  MIDAZOLAM HCL 5 MG/5ML INJ SOLN, , , ,     Current Outpatient Prescriptions:   •  atenolol (TENORMIN) 25 MG Tab, Take 1 Tab by mouth 2 times a day. Take extra 1 tab as needed palpitations, Disp: 270 Tab, Rfl: 2  •  Probiotic Product (PROBIOTIC DAILY PO), Take  by mouth., Disp: , Rfl:   •  PREBIOTIC PRODUCT PO, Take  by mouth., Disp: , Rfl:   •  furosemide (LASIX) 40 MG Tab, Take 2 Tabs by mouth every day. 80 mg in the AM, Disp: 60 Tab, Rfl: 3  •  spironolactone (ALDACTONE) 50 MG Tab, Take 1 Tab by mouth every day. Take in the AM (Patient taking differently: Take 50 mg by mouth every day. Take in the AM), Disp: 30 Tab, Rfl: 3  •  levothyroxine (SYNTHROID) 50 MCG Tab, TAKE 1 TABLET BY MOUTH EVERY MORNING ON AN EMPTY STOMACH., Disp: 90 Tab, Rfl: 3  •  raNITidine (ZANTAC) 150 MG Tab, TAKE ONE TABLET BY MOUTH TWICE DAILY, Disp: 180 Tab, Rfl: 3  •  dofetilide (TIKOSYN) 500 MCG Cap, Take 1 Cap by mouth every 12 hours., Disp: 60 Cap, Rfl: 6  •  lisinopril (PRINIVIL) 5 MG Tab, TAKE ONE TABLET BY MOUTH EVERY DAY, Disp: 90 Tab, Rfl: 3  •  estradiol (ESTRACE) 2 MG Tab, TAKE ONE TABLET BY MOUTH EVERY DAY, Disp: 90 Tab, Rfl: 3  •  magnesium oxide (MAG-OX) 400 (241.3 Mg) MG Tab tablet, Take 1 Tab by mouth every day., Disp: 30 Tab, Rfl: 6  •  warfarin (COUMADIN) 2.5 MG Tab, Take 2.5-3.75 mg by  "mouth every day. 3.75 mg on Monday and Wednesday 2.5 mg all other days, Disp: , Rfl:   •  sertraline (ZOLOFT) 25 MG tablet, Take 1 Tab by mouth every day., Disp: 90 Tab, Rfl: 3  •  digoxin (LANOXIN) 125 MCG Tab, Take 1 Tab by mouth every day., Disp: 90 Tab, Rfl: 3  •  Multiple Vitamins-Minerals (ICAPS AREDS 2) Cap, Take 2 Caps by mouth every day., Disp: 100 Cap, Rfl: 3  •  Diclofenac Sodium 1 % Gel, Apply  to affected area(s) 2 times a day as needed (For joint pain)., Disp: , Rfl:   •  acetaminophen (TYLENOL) 500 MG TABS, Take 1,000 mg by mouth 3 times a day. Indications: Pain, Disp: , Rfl:   •  sennosides-docusate sodium (SENOKOT-S) 8.6-50 MG tablet, Take 3 Tabs by mouth every day., Disp: 30 Tab, Rfl:   •  vitamin D (CHOLECALCIFEROL) 1000 UNIT TABS, Take 2,000 Units by mouth every day., Disp: , Rfl:     Current Outpatient Medications:    (Not in a hospital admission)    Medication Allergy:  Allergies   Allergen Reactions   • Amiodarone Hives     Throat and tongue itching   • Bactrim Shortness of Breath   • Claritin-D [Loratadine-Pseudoephedrine] Palpitations   • Clotrimazole      Stinging / burning on chest   • Metoprolol      Causes throat swelling   • Morphine      Hallucinations   • Qvar [Beclomethasone Dipropionate]      Pressure on heart     • Vibramycin Shortness of Breath   • Atorvastatin Calcium-Polysorbate 80      Muscle aches     • Augmentin      Unknown reaction   • Cipro Xr Swelling   • Diltiazem      rash   • Flecainide      dizziness   • Keflex Unspecified     Pt states \"Unsure\".   • Mucinex      GI Distress     • Tramadol      crying   • Tape Rash     Paper tape okay       Family History:  Family History   Problem Relation Age of Onset   • Stroke Mother    • Diabetes Father    • Stroke Sister    • Heart Disease Brother    • Stroke Sister    • GI Daughter      Crohn's Disease   • Heart Disease Daughter      CHF   • Other Daughter      Chronic Pain--Lymphedema   • Cancer Paternal Aunt        Social " "History:  Social History     Social History   • Marital status:      Spouse name: N/A   • Number of children: N/A   • Years of education: N/A     Occupational History   • Not on file.     Social History Main Topics   • Smoking status: Never Smoker   • Smokeless tobacco: Never Used   • Alcohol use No   • Drug use: No   • Sexual activity: Not Currently     Partners: Male     Birth control/ protection: Post-Menopausal     Other Topics Concern   • Not on file     Social History Narrative   • No narrative on file         Physical Exam:  Vitals/ General Appearance:   Weight/BMI: Body mass index is 30.04 kg/m².  Blood pressure 111/66, pulse (!) 150, temperature 36.8 °C (98.2 °F), temperature source Temporal, resp. rate 16, height 1.626 m (5' 4\"), weight 79.4 kg (175 lb), last menstrual period 01/01/1993, SpO2 99 %.  Vitals:    06/29/18 1645 06/29/18 1650 06/29/18 1650 06/29/18 1655   BP: 117/86  111/66    Pulse: (!) 133 (!) 139 (!) 136 (!) 150   Resp: 18  16    Temp:       TempSrc:       SpO2: 100% 100% 100% 99%   Weight:       Height:         Oxygen Therapy:  Pulse Oximetry: 99 %, O2 (LPM): 2, O2 Delivery: Nasal Cannula    Constitutional:   Well developed, Well nourished, No acute distress  HENMT:  Normocephalic, Atraumatic, Oropharynx moist mucous membranes, No oral exudates, Nose normal.  No thyromegaly.  Eyes:  EOMI, Conjunctiva normal, No discharge.  Neck:  Normal range of motion, No cervical tenderness,  no JVD.  Cardiovascular:  There is presence of an irregularly irregular heartbeats, No murmurs, No rubs, No gallops.   Extremitites with intact distal pulses, no cyanosis, or edema.  Lungs:  Normal breath sounds, breath sounds clear to auscultation bilaterally,  no rales, no rhonchi, no wheezing.   Abdomen: Bowel sounds normal, Soft, No tenderness, No guarding, No rebound, No masses, No hepatosplenomegaly.  Skin: Warm, Dry, No erythema, No rash, no induration.  Neurologic: Alert & oriented x 3, No focal " deficits noted, cranial nerves II through X are intact.  Psychiatric: Affect normal, Judgment normal, Mood normal.      MDM (Data Review):     Records reviewed and summarized in current documentation    Lab Data Review:  Recent Results (from the past 24 hour(s))   CBC w/ Differential    Collection Time: 18  2:57 PM   Result Value Ref Range    WBC 12.0 (H) 4.8 - 10.8 K/uL    RBC 4.82 4.20 - 5.40 M/uL    Hemoglobin 15.1 12.0 - 16.0 g/dL    Hematocrit 46.3 37.0 - 47.0 %    MCV 96.1 81.4 - 97.8 fL    MCH 31.3 27.0 - 33.0 pg    MCHC 32.6 (L) 33.6 - 35.0 g/dL    RDW 50.6 (H) 35.9 - 50.0 fL    Platelet Count 279 164 - 446 K/uL    MPV 10.3 9.0 - 12.9 fL    Neutrophils-Polys 71.90 44.00 - 72.00 %    Lymphocytes 20.30 (L) 22.00 - 41.00 %    Monocytes 6.20 0.00 - 13.40 %    Eosinophils 0.70 0.00 - 6.90 %    Basophils 0.50 0.00 - 1.80 %    Immature Granulocytes 0.40 0.00 - 0.90 %    Nucleated RBC 0.00 /100 WBC    Neutrophils (Absolute) 8.62 (H) 2.00 - 7.15 K/uL    Lymphs (Absolute) 2.44 1.00 - 4.80 K/uL    Monos (Absolute) 0.75 0.00 - 0.85 K/uL    Eos (Absolute) 0.09 0.00 - 0.51 K/uL    Baso (Absolute) 0.06 0.00 - 0.12 K/uL    Immature Granulocytes (abs) 0.05 0.00 - 0.11 K/uL    NRBC (Absolute) 0.00 K/uL   Btype Natriuretic Peptide    Collection Time: 18  2:57 PM   Result Value Ref Range    B Natriuretic Peptide 163 (H) 0 - 100 pg/mL   Troponin STAT    Collection Time: 18  2:57 PM   Result Value Ref Range    Troponin I 0.02 0.00 - 0.04 ng/mL   EKG (NOW)    Collection Time: 18  3:48 PM   Result Value Ref Range    Report       [facility]    Test Date:  2018  Pt Name:    STEPHANIE DAVENPORT                 Department: ER  MRN:        [id number]                      Room:       RD 04  Gender:     Female                       Technician: [id number]  :        [id number]                   Requested By:ER TRIAGE PROTOCOL  Order #:    [id number]                    Reading  MD:    Measurements  Intervals                                Axis  Rate:       160                          P:          0  LA:         116                          QRS:        -29  QRSD:       68                           T:          56  QT:         292  QTc:        477    Interpretive Statements  SUPRAVENTRICULAR TACHYCARDIA  BORDERLINE LEFT AXIS DEVIATION  BORDERLINE LOW VOLTAGE IN FRONTAL LEADS  ST DEPRESSION, PROBABLY RATE RELATED  Compared to ECG 06/12/2018 15:59:14  ST (T wave) deviation now present  Atrial fibrillation no longer present  Early repolarization no longer present  Prolonged QT interval no longer p resent         Imaging/Procedures Review:    Chest Xray:  Reviewed    EKG:   As in HPI.     MDM (Assessment and Plan):     Active Hospital Problems    Diagnosis   • Pre-syncope [R55]   • Persistent atrial fibrillation (HCC) [I48.1]       This is a 77-year-old woman with persistent atrial fibrillation along with polypharmacy allergies along with high risk medication use for atrial fibrillation.  At this time, patient is experiencing symptom of presyncope with her atrial fibrillation and RVR.  We will consult our electrophysiology colleagues who knows her very well.  Perhaps, AV izzy ablation and permanent pacemaker placement would be a good option for her.  We will wait for our colleague.  In the meantime, we will continue Coumadin for now until EP sees her. Will check for INR.    No heart failure exacerbation.  Low BNP and patient is able to lie flat.     Thank you for referring this patient to our cardiology service.  We will follow patient with you.    Aida Perry MD.  Cardiology Inpatient Service.  [facility].  [phone].  [place], [place].

## 2018-07-01 ENCOUNTER — APPOINTMENT (OUTPATIENT)
Dept: RADIOLOGY | Facility: MEDICAL CENTER | Age: 78
DRG: 243 | End: 2018-07-01
Attending: INTERNAL MEDICINE
Payer: MEDICARE

## 2018-07-01 VITALS
TEMPERATURE: 97.8 F | WEIGHT: 179.9 LBS | DIASTOLIC BLOOD PRESSURE: 79 MMHG | BODY MASS INDEX: 30.71 KG/M2 | HEIGHT: 64 IN | SYSTOLIC BLOOD PRESSURE: 129 MMHG | OXYGEN SATURATION: 97 % | HEART RATE: 102 BPM | RESPIRATION RATE: 19 BRPM

## 2018-07-01 LAB
ANION GAP SERPL CALC-SCNC: 8 MMOL/L (ref 0–11.9)
BUN SERPL-MCNC: 16 MG/DL (ref 8–22)
CALCIUM SERPL-MCNC: 8.9 MG/DL (ref 8.5–10.5)
CHLORIDE SERPL-SCNC: 106 MMOL/L (ref 96–112)
CO2 SERPL-SCNC: 25 MMOL/L (ref 20–33)
CREAT SERPL-MCNC: 0.88 MG/DL (ref 0.5–1.4)
EKG IMPRESSION: NORMAL
GLUCOSE SERPL-MCNC: 109 MG/DL (ref 65–99)
INR PPP: 2.87 (ref 0.87–1.13)
LV EJECT FRACT  99904: 55
MAGNESIUM SERPL-MCNC: 2 MG/DL (ref 1.5–2.5)
POTASSIUM SERPL-SCNC: 4.6 MMOL/L (ref 3.6–5.5)
PROTHROMBIN TIME: 29.8 SEC (ref 12–14.6)
SODIUM SERPL-SCNC: 139 MMOL/L (ref 135–145)

## 2018-07-01 PROCEDURE — 700102 HCHG RX REV CODE 250 W/ 637 OVERRIDE(OP): Performed by: STUDENT IN AN ORGANIZED HEALTH CARE EDUCATION/TRAINING PROGRAM

## 2018-07-01 PROCEDURE — A9270 NON-COVERED ITEM OR SERVICE: HCPCS | Performed by: INTERNAL MEDICINE

## 2018-07-01 PROCEDURE — 71045 X-RAY EXAM CHEST 1 VIEW: CPT

## 2018-07-01 PROCEDURE — 93010 ELECTROCARDIOGRAM REPORT: CPT | Performed by: INTERNAL MEDICINE

## 2018-07-01 PROCEDURE — A9270 NON-COVERED ITEM OR SERVICE: HCPCS | Performed by: STUDENT IN AN ORGANIZED HEALTH CARE EDUCATION/TRAINING PROGRAM

## 2018-07-01 PROCEDURE — 93306 TTE W/DOPPLER COMPLETE: CPT

## 2018-07-01 PROCEDURE — 700105 HCHG RX REV CODE 258: Performed by: INTERNAL MEDICINE

## 2018-07-01 PROCEDURE — 700111 HCHG RX REV CODE 636 W/ 250 OVERRIDE (IP): Performed by: INTERNAL MEDICINE

## 2018-07-01 PROCEDURE — 93005 ELECTROCARDIOGRAM TRACING: CPT | Performed by: INTERNAL MEDICINE

## 2018-07-01 PROCEDURE — 80048 BASIC METABOLIC PNL TOTAL CA: CPT

## 2018-07-01 PROCEDURE — 93306 TTE W/DOPPLER COMPLETE: CPT | Mod: 26 | Performed by: INTERNAL MEDICINE

## 2018-07-01 PROCEDURE — 700102 HCHG RX REV CODE 250 W/ 637 OVERRIDE(OP): Performed by: INTERNAL MEDICINE

## 2018-07-01 PROCEDURE — 83735 ASSAY OF MAGNESIUM: CPT

## 2018-07-01 PROCEDURE — 99239 HOSP IP/OBS DSCHRG MGMT >30: CPT | Performed by: INTERNAL MEDICINE

## 2018-07-01 PROCEDURE — 36415 COLL VENOUS BLD VENIPUNCTURE: CPT

## 2018-07-01 PROCEDURE — 85610 PROTHROMBIN TIME: CPT

## 2018-07-01 RX ORDER — WARFARIN SODIUM 7.5 MG/1
3.75 TABLET ORAL
Status: DISCONTINUED | OUTPATIENT
Start: 2018-07-02 | End: 2018-07-01 | Stop reason: HOSPADM

## 2018-07-01 RX ORDER — ATENOLOL 50 MG/1
50 TABLET ORAL 2 TIMES DAILY
Qty: 60 TAB | Refills: 2 | Status: SHIPPED | OUTPATIENT
Start: 2018-07-01 | End: 2018-07-03

## 2018-07-01 RX ORDER — WARFARIN SODIUM 2.5 MG/1
2.5 TABLET ORAL
Status: DISCONTINUED | OUTPATIENT
Start: 2018-07-01 | End: 2018-07-01 | Stop reason: HOSPADM

## 2018-07-01 RX ORDER — LISINOPRIL 5 MG/1
5 TABLET ORAL
Status: DISCONTINUED | OUTPATIENT
Start: 2018-07-01 | End: 2018-07-01 | Stop reason: HOSPADM

## 2018-07-01 RX ORDER — ATORVASTATIN CALCIUM 10 MG/1
10 TABLET, FILM COATED ORAL DAILY
Qty: 30 TAB | Refills: 1 | Status: SHIPPED | OUTPATIENT
Start: 2018-07-01 | End: 2018-07-03

## 2018-07-01 RX ORDER — ATENOLOL 50 MG/1
100 TABLET ORAL 2 TIMES DAILY
Status: DISCONTINUED | OUTPATIENT
Start: 2018-07-01 | End: 2018-07-01 | Stop reason: HOSPADM

## 2018-07-01 RX ORDER — ATENOLOL 50 MG/1
50 TABLET ORAL
Status: DISCONTINUED | OUTPATIENT
Start: 2018-07-01 | End: 2018-07-01 | Stop reason: HOSPADM

## 2018-07-01 RX ADMIN — FAMOTIDINE 20 MG: 20 TABLET, FILM COATED ORAL at 05:34

## 2018-07-01 RX ADMIN — ASPIRIN 81 MG: 81 TABLET, COATED ORAL at 05:34

## 2018-07-01 RX ADMIN — ATENOLOL 50 MG: 50 TABLET ORAL at 09:22

## 2018-07-01 RX ADMIN — DIGOXIN 125 MCG: 125 TABLET ORAL at 05:33

## 2018-07-01 RX ADMIN — VANCOMYCIN HYDROCHLORIDE 1200 MG: 100 INJECTION, POWDER, LYOPHILIZED, FOR SOLUTION INTRAVENOUS at 01:47

## 2018-07-01 RX ADMIN — ACETAMINOPHEN 1000 MG: 500 TABLET, FILM COATED ORAL at 05:31

## 2018-07-01 RX ADMIN — LEVOTHYROXINE SODIUM 50 MCG: 50 TABLET ORAL at 05:33

## 2018-07-01 RX ADMIN — STANDARDIZED SENNA CONCENTRATE AND DOCUSATE SODIUM 2 TABLET: 8.6; 5 TABLET, FILM COATED ORAL at 05:32

## 2018-07-01 RX ADMIN — LISINOPRIL 5 MG: 5 TABLET ORAL at 09:23

## 2018-07-01 RX ADMIN — SPIRONOLACTONE 25 MG: 25 TABLET, FILM COATED ORAL at 05:35

## 2018-07-01 RX ADMIN — ATENOLOL 50 MG: 50 TABLET ORAL at 05:32

## 2018-07-01 RX ADMIN — VITAMIN D, TAB 1000IU (100/BT) 2000 UNITS: 25 TAB at 05:35

## 2018-07-01 RX ADMIN — ESTRADIOL 2 MG: 2 TABLET ORAL at 05:31

## 2018-07-01 ASSESSMENT — COGNITIVE AND FUNCTIONAL STATUS - GENERAL
WALKING IN HOSPITAL ROOM: A LITTLE
DRESSING REGULAR UPPER BODY CLOTHING: A LITTLE
MOVING FROM LYING ON BACK TO SITTING ON SIDE OF FLAT BED: A LITTLE
MOVING TO AND FROM BED TO CHAIR: A LITTLE
DRESSING REGULAR LOWER BODY CLOTHING: A LITTLE
TOILETING: A LITTLE
SUGGESTED CMS G CODE MODIFIER DAILY ACTIVITY: CJ
SUGGESTED CMS G CODE MODIFIER MOBILITY: CK
TURNING FROM BACK TO SIDE WHILE IN FLAT BAD: A LITTLE
MOBILITY SCORE: 18
CLIMB 3 TO 5 STEPS WITH RAILING: A LITTLE
DAILY ACTIVITIY SCORE: 20
HELP NEEDED FOR BATHING: A LITTLE
STANDING UP FROM CHAIR USING ARMS: A LITTLE

## 2018-07-01 ASSESSMENT — ENCOUNTER SYMPTOMS
WEAKNESS: 1
PALPITATIONS: 0
ORTHOPNEA: 0
CLAUDICATION: 0
SHORTNESS OF BREATH: 0
COUGH: 0
PND: 0

## 2018-07-01 ASSESSMENT — PAIN SCALES - GENERAL
PAINLEVEL_OUTOF10: 0
PAINLEVEL_OUTOF10: 1
PAINLEVEL_OUTOF10: 0
PAINLEVEL_OUTOF10: 1

## 2018-07-01 NOTE — PROGRESS NOTES
Cardiology Progress Note               Author: Yarelis Mahoney Date & Time created: 2018  7:46 AM       Consultation for presyncope and conversion pauses         Admitted with pre-syncope    History of persistent atrial fibrillation, intolerant to multiple medications (Tikosyn, sotalol, amiodarone, flecainide, Multaq)      Labs reviewed  Sodium, potassium, creatinine, magnesium stable  Triglycerides 307  Troponin negative        BP = 140/90  HR = atrial fibrillation          Review of Systems   Constitutional: Positive for malaise/fatigue.        With exertion   Respiratory: Negative for cough and shortness of breath.    Cardiovascular: Negative for chest pain, palpitations, orthopnea, claudication, leg swelling and PND.   Neurological: Positive for weakness.       Physical Exam   Constitutional: She is oriented to person, place, and time.   HENT:   Head: Normocephalic.   Eyes: Conjunctivae are normal.   Neck: No JVD present. No thyromegaly present.   Cardiovascular: A regularly irregular rhythm present. Tachycardia present.    Pulses:       Carotid pulses are 2+ on the right side, and 2+ on the left side.       Radial pulses are 2+ on the right side, and 2+ on the left side.   Aflutter , 120's at rest, 140-150 with exertion   Pulmonary/Chest: She has no wheezes.   Abdominal: Soft.   Musculoskeletal: She exhibits no edema.   Neurological: She is alert and oriented to person, place, and time.   Skin: Skin is warm and dry.   Pocket site, no hematoma, no erythema       Hemodynamics:  Temp (24hrs), Av.7 °C (98 °F), Min:36.3 °C (97.4 °F), Max:37.2 °C (99 °F)  Temperature: 36.3 °C (97.4 °F)  Pulse  Av.9  Min: 84  Max: 167  Blood Pressure : 141/99     Respiratory:    Respiration: 16, Pulse Oximetry: 96 %     Work Of Breathing / Effort: Mild  RUL Breath Sounds: Clear, RML Breath Sounds: Clear, RLL Breath Sounds: Clear, DO Breath Sounds: Clear, LLL Breath Sounds: Clear  Fluids:     Weight: 81.6 kg  (179 lb 14.3 oz)  GI/Nutrition:  Orders Placed This Encounter   Procedures   • Diet Order Cardiac     Standing Status:   Standing     Number of Occurrences:   1     Order Specific Question:   Diet:     Answer:   Cardiac [6]     Lab Results:  Recent Labs      06/29/18   1457  06/30/18   0359   WBC  12.0*  10.0   RBC  4.82  4.51   HEMOGLOBIN  15.1  13.9   HEMATOCRIT  46.3  43.6   MCV  96.1  96.7   MCH  31.3  30.8   MCHC  32.6*  31.9*   RDW  50.6*  52.0*   PLATELETCT  279  239   MPV  10.3  10.2     Recent Labs      06/29/18   1457  06/30/18   0359  07/01/18   0334   SODIUM  139  138  139   POTASSIUM  4.3  4.2  4.6   CHLORIDE  101  105  106   CO2  24  24  25   GLUCOSE  167*  98  109*   BUN  27*  21  16   CREATININE  1.10  0.77  0.88   CALCIUM  9.4  8.9  8.9     Recent Labs      06/29/18   1457  06/30/18   0359  07/01/18   0334   INR  3.13*  3.24*  2.87*     Recent Labs      06/29/18   1457   BNPBTYPENAT  163*     Recent Labs      06/29/18   1457  06/30/18   0359   TROPONINI  0.02  0.01   BNPBTYPENAT  163*   --      Recent Labs      06/30/18   0359   TRIGLYCERIDE  307*   HDL  32*   LDL  47         Medical Decision Making, by Problem:  Active Hospital Problems    Diagnosis   • *Persistent atrial fibrillation (HCC) [I48.1]   • Pre-syncope [R55]   • Supratherapeutic INR [R79.1]   • Multiple drug allergies [Z88.9]   • Bilateral lower extremity edema [R60.0]   • Hypotension [I95.9]   • Mild Leukocytosis [D72.829]   • Elevated serum creatinine [R79.89]   • Depression [F32.9]   • CAD (coronary artery disease) [I25.10]   • Hypothyroidism [E03.9]   • Spinal stenosis, multilevel [M48.00]   • Mixed hyperlipidemia [E78.2]       Assessment/Plan:    Rate remains RVR  Atenolol increase to 50 mg BID  Ablation as out patient  Will arrange    Presyncope (A. fib/atrial tachycardia RVR with postconversion pauses)     -S/p successful dual-chamber pacemaker implantation, Futon  -CXR 7/1/18, no evidence of pneumothorax  -Device was  interrogated this morning, functioning well  -Appt with our EP office in 2 days, was arranged        Quality-Core Measures   Reviewed items::  Medications reviewed and Labs reviewed

## 2018-07-01 NOTE — PROGRESS NOTES
If xray and pacer check ok this am can be dc home on higher dose of atenolol. F/U with Anabel in my office and consideration of AT RFA and av izzy RFA in next few weeks.

## 2018-07-01 NOTE — PROGRESS NOTES
Inpatient Anticoagulation Service Note    Date: 7/1/2018  Reason for Anticoagulation: Atrial Fibrillation        Hemoglobin Value: 13.9  Hematocrit Value: 43.6  Lab Platelet Value: 239  Target INR: 2.0 to 3.0    INR from last 7 days     Date/Time INR Value    07/01/18 0334 (!)  2.87    06/30/18 0359 (!)  3.24    06/29/18 1457 (!)  3.13        Dose from last 7 days     Date/Time Dose (mg)    07/01/18 0600  2.5    06/30/18 0900  0.5    06/30/18 0000  0        Average Dose (mg):  (Home Dose: 3.75 mg Mo and 2.5 mg ROW per [facility])  Significant Interactions: Thyroid Medications  Bridge Therapy: No     Reversal Agent Administered: Not Applicable  Comments: INR is within range. Plan to resume home dose of 3.75 mg on Monday and 2.5 mg all other days. Held dose of warfarin yesterday for likely PPM, resumed warfarin today. H/H is stable, no signs or symptoms of bruising or bleeding noted. Pacemaker placed area is fine with no noted hematoma.     Plan:  Resume home dose of 3.75 mg on Monday and 2.5 mg all other days.   Education Material Provided?: No  Pharmacist suggested discharge dosing: 3.75 mg on Monday and 2.5 mg all other days, follow up with anticoagulation clinic 3-4 days after discharge.      Edy Beltran, PharmD

## 2018-07-01 NOTE — PROGRESS NOTES
Received bedside report from nightshift RN.  Pt awake, a/o x 4, voices no complaints.  Pt in bed, bed in lowest position and wheels locked.  Call light at pt's side.

## 2018-07-01 NOTE — CARE PLAN
Problem: Pain Management  Goal: Pain level will decrease to patient's comfort goal  Outcome: PROGRESSING AS EXPECTED  Patient complains of incision pain and also aching pain in her left shoulder/arm. Tylenol administered per orders. Pain assessment/reassesment per policy. Refocusing, relaxation, repositioning and hot/cool application implemented as needed.     Problem: Skin Integrity  Goal: Risk for impaired skin integrity will decrease  Outcome: PROGRESSING AS EXPECTED  Keep skin clean and dry. Waffle cushion utilized and heels elevated using pillows.     Problem: Safety  Goal: Will remain free from falls  Outcome: PROGRESSING AS EXPECTED  Bed locked and in lowest position, call light and personal belongings in reach. Non skid socks on. Purposeful rounding. Up with 1 person stand by assistance when ambulating. Front wheel walker. Patient demonstrates proper use of call light.

## 2018-07-01 NOTE — PROGRESS NOTES
"[facility] INTERNAL MEDICINE ATTENDING NOTE:   Joceline Chapa MD    Date & Time note created:   7/1/2018   1:02 PM     Visit Time:   Attending/resident bedside rounds 9-11:30 AM    The patient was evaluated with the resident staff.  I reviewed the resident's note and agree with the resident's findings and plan as documented in the resident's note except as documented in the attending note. Please reference resident daily note for complete information.    The chart was reviewed and summarized.  Available labs, imaging, O2 sats ,  EKGs were reviewed. Available nursing, consultant, and resident notes were reviewed. I am actively involved in the patient's care.     Additional attending notes:                                                                          RVR with ambulation this morning (to 140s), and patient reported SOB but denied being \"symptomatic\" (question if her SOB/fatigue is her symptom of RVR). Was HR 110s-120s during my visit, completely asymptomatic. Device interrogated, functioning well, CXR shows to PTX, pocket site with steristrips and no hematoma, fluctuance, drainage, etc. Case discussed with cardiology team, Yarelis Mahoney, who discussed with Dr. Perry. They are recommending dc to home with increase atenolol dose and outpatient EP follow up, which has been arranged. Will discuss with patient regarding this plan, ultimately she will likely require ablation for more definitive treatment with the EP team, and perhaps dc with outpatient f/u is the way to best expedite this evaluation. Will work on coordination for this.     Joceline Chapa MD   [facility] Hospitalist     "

## 2018-07-01 NOTE — PROGRESS NOTES
Report received from off going RN. Plan of care reviewed with patient and all questions answered. Bed locked and in lowest position, call light and personal belongings in reach. Non skid socks on. Purposeful rounding. S/P pacemaker placement, area is CD&I and no hematoma present. Pressure dressing intact and left arm in sling. VSS. Patient complains of minimal pain to incision/left arm that is relieved with tylenol.

## 2018-07-01 NOTE — DISCHARGE SUMMARY
Internal Medicine Discharge Summary     Date of Admission: 06/29/2018  Date of Discharge: 07/01/2018  Service: [facility]   Attending Physician: Eduard   Senior Resident: Alf  Intern: Eddie Plata    Discharge Diagnoses:   1. Presyncope due to atrial fibrillation/flutter with rapid ventricular response (RVR)     Procedures and Imaging:   In ED 6/29/2018: Attempted cardioversion x 2, unsuccessful   6/30/2018: [employer] dual chamber pacemaker placement     Consultants:   Cardiology, Celestino Perry and Byron    History of Present Illness:   Ms. Magaña is a very pleasant 77 year old female with past medical history of persistent atrial fibrillation/flutter which has been challenging to manage due in part to medication intolerances who presented with presyncope and was found to be in a fib/flutter with RVR. Please see admission H&P for details.     Hospital Course by Problem:   1. Presyncope due to atrial fibrillation/flutter with rapid ventricular response (RVR) - attempted cardioversion in ED x 2 without conversion. She was continued on her home atenolol and digoxin (dig level 0.8) during her first night, however was noted to have 3 second pauses during sleep so due to concern for block this was held. She underwent uneventful PPM placement on 6/30 and interrogation showed this to be functioning well on morning of discharge. She was noted to have increased heart rate with ambulation ( to 140s), but with stable blood pressure and patient denied symptoms. Case discussed with cardiology. Her atenolol dose was increased from 25 mg BID to 50 mg, digoxin and warfarin continued (INR 2.87 on day of discharge), and cardiology arranged for outpatient follow up in electrophysiology clinic two days after discharge for further evaluation and consideration for ablation procedure.     Other medical problems were stable during her hospital stay.     Physical Exam on Day of Discharge:   Blood pressure 129/79, pulse (!) 102,  "temperature 36.6 °C (97.8 °F), resp. rate 19, height 1.626 m (5' 4\"), weight 81.6 kg (179 lb 14.3 oz), last menstrual period 01/01/1993, SpO2 97 %, not currently breastfeeding.  Gen: sitting in bed in no acute distress   HEENT: EOMI, mmm   CV: irregularly irregular rhythm, accelerated rate  Chest: pacemaker site with steristrips in place, no ecchymoses, drainage, induration.   Resp: CTAB, breath sounds heard throughout   Abd: nondistended   Ext: no edema appreciated   Skin: red patch on chest consistent with pad from cardioversion     Condition at Discharge:   Stable    Disposition:   To home     Discharge Medications:      Medication List      START taking these medications      Instructions   atorvastatin 10 MG Tabs  Commonly known as:  LIPITOR   Take 1 Tab by mouth every day.  Dose:  10 mg        CHANGE how you take these medications      Instructions   atenolol 50 MG Tabs  What changed:  · medication strength  · how much to take  · additional instructions  Commonly known as:  TENORMIN   Take 1 Tab by mouth 2 times a day.  Dose:  50 mg        CONTINUE taking these medications      Instructions   acetaminophen 500 MG Tabs  Commonly known as:  TYLENOL   Take 1,000 mg by mouth 3 times a day. Indications: Pain  Dose:  1000 mg     digoxin 125 MCG Tabs  Commonly known as:  LANOXIN   Take 1 Tab by mouth every day.  Dose:  125 mcg     estradiol 2 MG Tabs  Commonly known as:  ESTRACE   TAKE ONE TABLET BY MOUTH EVERY DAY     furosemide 40 MG Tabs  Commonly known as:  LASIX   Take 2 Tabs by mouth every day. 80 mg in the AM  Dose:  80 mg     ICAPS AREDS 2 Caps   Take 2 Caps by mouth 2 Times a Day.  Dose:  2 Cap     levothyroxine 50 MCG Tabs  Commonly known as:  SYNTHROID   TAKE 1 TABLET BY MOUTH EVERY MORNING ON AN EMPTY STOMACH.     lisinopril 5 MG Tabs  Commonly known as:  PRINIVIL   TAKE ONE TABLET BY MOUTH EVERY DAY     magnesium oxide 400 (241.3 Mg) MG Tabs tablet  Commonly known as:  MAG-OX   Take 1 Tab by mouth every " day.  Dose:  400 mg     raNITidine 150 MG Tabs  Commonly known as:  ZANTAC   TAKE ONE TABLET BY MOUTH TWICE DAILY     sertraline 25 MG tablet  Commonly known as:  ZOLOFT   Take 1 Tab by mouth every day.  Dose:  25 mg     spironolactone 50 MG Tabs  Commonly known as:  ALDACTONE   Take 25 mg by mouth every day.  Dose:  25 mg     vitamin D 1000 UNIT Tabs  Commonly known as:  cholecalciferol   Take 2,000 Units by mouth every day.  Dose:  2000 Units     warfarin 2.5 MG Tabs  Commonly known as:  COUMADIN   Take 2.5-3.75 mg by mouth every day. 3.75 mg on Monday and Wednesday 2.5 mg all other days  Dose:  2.5-3.75 mg        STOP taking these medications    dofetilide 500 MCG Caps  Commonly known as:  TIKOSYN              Instructions:   Return to the ER should you develop any lightheadedness, dizziness, chest pain or other concerning symptoms.     Follow-up:   With GODWIN Ospina with electrophysiology on Tuesday, July 3rd.     Time Spent on Discharge: 40 minutes

## 2018-07-01 NOTE — PROGRESS NOTES
Discharge  Pt discharged to home,  and son here to take pt home.  Pt eager to return to home, follow-up appointments (including cardiology for ablation) reviewed with pt and , written reminder included in after visit summary.  Monitor room notified of pt's immediate discharge, saline lock removed intact, pt took all belongings (except was unable to find white terrycloth robe that she brought in).  Pt will notify lost & found, pt does have all other belongings.  Pt escorted via wheelchair to private car.

## 2018-07-01 NOTE — DISCHARGE INSTRUCTIONS
Discharge Instructions    Discharged to home by car with relative. Discharged via wheelchair, hospital escort: Yes.  Special equipment needed: Not Applicable    Be sure to schedule a follow-up appointment with your primary care doctor or any specialists as instructed.     Discharge Plan:   Diet Plan: Discussed  Activity Level: Discussed  Confirmed Follow up Appointment: Patient to Call and Schedule Appointment  Confirmed Symptoms Management: Discussed  Medication Reconciliation Updated: Yes  Pneumococcal Vaccine Administered/Refused: Not given - Patient refused pneumococcal vaccine (Wants to discuss with PCP)  Influenza Vaccine Indication: Not indicated: Previously immunized this influenza season and > 8 years of age    I understand that a diet low in cholesterol, fat, and sodium is recommended for good health. Unless I have been given specific instructions below for another diet, I accept this instruction as my diet prescription.   Other diet: Cardiac Diet as tolerated    Special Instructions: None    · Is patient discharged on Warfarin / Coumadin?   Yes    You are receiving the drug warfarin. Please understand the importance of monitoring warfarin with scheduled PT/INR blood draws.  Follow-up with a call to your personal Doctor's office in 3 days to schedule a PT/INR.       IMPORTANT: HOW TO USE THIS INFORMATION:  This is a summary and does NOT have all possible information about this product. This information does not assure that this product is safe, effective, or appropriate for you. This information is not individual medical advice and does not substitute for the advice of your health care professional. Always ask your health care professional for complete information about this product and your specific health needs.      WARFARIN - ORAL (WARF-uh-rin)      COMMON BRAND NAME(S): Coumadin      WARNING:  Warfarin can cause very serious (possibly fatal) bleeding. This is more likely to occur when you first start  "taking this medication or if you take too much warfarin. To decrease your risk for bleeding, your doctor or other health care provider will monitor you closely and check your lab results (INR test) to make sure you are not taking too much warfarin. Keep all medical and laboratory appointments. Tell your doctor right away if you notice any signs of serious bleeding. See also Side Effects section.      USES:  This medication is used to treat blood clots (such as in deep vein thrombosis-DVT or pulmonary embolus-PE) and/or to prevent new clots from forming in your body. Preventing harmful blood clots helps to reduce the risk of a stroke or heart attack. Conditions that increase your risk of developing blood clots include a certain type of irregular heart rhythm (atrial fibrillation), heart valve replacement, recent heart attack, and certain surgeries (such as hip/knee replacement). Warfarin is commonly called a \"blood thinner,\" but the more correct term is \"anticoagulant.\" It helps to keep blood flowing smoothly in your body by decreasing the amount of certain substances (clotting proteins) in your blood.      HOW TO USE:  Read the Medication Guide provided by your pharmacist before you start taking warfarin and each time you get a refill. If you have any questions, ask your doctor or pharmacist. Take this medication by mouth with or without food as directed by your doctor or other health care professional, usually once a day. It is very important to take it exactly as directed. Do not increase the dose, take it more frequently, or stop using it unless directed by your doctor. Dosage is based on your medical condition, laboratory tests (such as INR), and response to treatment. Your doctor or other health care provider will monitor you closely while you are taking this medication to determine the right dose for you. Use this medication regularly to get the most benefit from it. To help you remember, take it at the same " time each day. It is important to eat a balanced, consistent diet while taking warfarin. Some foods can affect how warfarin works in your body and may affect your treatment and dose. Avoid sudden large increases or decreases in your intake of foods high in vitamin K (such as broccoli, cauliflower, cabbage, brussels sprouts, kale, spinach, and other green leafy vegetables, liver, green tea, certain vitamin supplements). If you are trying to lose weight, check with your doctor before you try to go on a diet. Cranberry products may also affect how your warfarin works. Limit the amount of cranberry juice (16 ounces/480 milliliters a day) or other cranberry products you may drink or eat.      SIDE EFFECTS:  Nausea, loss of appetite, or stomach/abdominal pain may occur. If any of these effects persist or worsen, tell your doctor or pharmacist promptly. Remember that your doctor has prescribed this medication because he or she has judged that the benefit to you is greater than the risk of side effects. Many people using this medication do not have serious side effects. This medication can cause serious bleeding if it affects your blood clotting proteins too much (shown by unusually high INR lab results). Even if your doctor stops your medication, this risk of bleeding can continue for up to a week. Tell your doctor right away if you have any signs of serious bleeding, including: unusual pain/swelling/discomfort, unusual/easy bruising, prolonged bleeding from cuts or gums, persistent/frequent nosebleeds, unusually heavy/prolonged menstrual flow, pink/dark urine, coughing up blood, vomit that is bloody or looks like coffee grounds, severe headache, dizziness/fainting, unusual or persistent tiredness/weakness, bloody/black/tarry stools, chest pain, shortness of breath, difficulty swallowing. Tell your doctor right away if any of these unlikely but serious side effects occur: persistent nausea/vomiting, severe  stomach/abdominal pain, yellowing eyes/skin. This drug rarely has caused very serious (possibly fatal) problems if its effects lead to small blood clots (usually at the beginning of treatment). This can lead to severe skin/tissue damage that may require surgery or amputation if left untreated. Patients with certain blood conditions (protein C or S deficiency) may be at greater risk. Get medical help right away if any of these rare but serious side effects occur: painful/red/purplish patches on the skin (such as on the toe, breast, abdomen), change in the amount of urine, vision changes, confusion, slurred speech, weakness on one side of the body. A very serious allergic reaction to this drug is rare. However, get medical help right away if you notice any symptoms of a serious allergic reaction, including: rash, itching/swelling (especially of the face/tongue/throat), severe dizziness, trouble breathing. This is not a complete list of possible side effects. If you notice other effects not listed above, contact your doctor or pharmacist. In the [place] - Call your doctor for medical advice about side effects. You may report side effects to FDA at 7-560-MXM-6417. In [place] - Call your doctor for medical advice about side effects. You may report side effects to [facility] [place] at 1-877.313.4003.      PRECAUTIONS:  Before taking warfarin, tell your doctor or pharmacist if you are allergic to it; or if you have any other allergies. This product may contain inactive ingredients, which can cause allergic reactions or other problems. Talk to your pharmacist for more details. Before using this medication, tell your doctor or pharmacist your medical history, especially of: blood disorders (such as anemia, hemophilia), bleeding problems (such as bleeding of the stomach/intestines, bleeding in the brain), blood vessel disorders (such as aneurysms), recent major injury/surgery, liver disease, alcohol use, mental/mood disorders  (including memory problems), frequent falls/injuries. It is important that all your doctors and dentists know that you take warfarin. Before having surgery or any medical/dental procedures, tell your doctor or dentist that you are taking this medication and about all the products you use (including prescription drugs, nonprescription drugs, and herbal products). Avoid getting injections into the muscles. If you must have an injection into a muscle (for example, a flu shot), it should be given in the arm. This way, it will be easier to check for bleeding and/or apply pressure bandages. This medication may cause stomach bleeding. Daily use of alcohol while using this medicine will increase your risk for stomach bleeding and may also affect how this medication works. Limit or avoid alcoholic beverages. If you have not been eating well, if you have an illness or infection that causes fever, vomiting, or diarrhea for more than 2 days, or if you start using any antibiotic medications, contact your doctor or pharmacist immediately because these conditions can affect how warfarin works. This medication can cause heavy bleeding. To lower the chance of getting cut, bruised, or injured, use great caution with sharp objects like safety razors and nail cutters. Use an electric razor when shaving and a soft toothbrush when brushing your teeth. Avoid activities such as contact sports. If you fall or injure yourself, especially if you hit your head, call your doctor immediately. Your doctor may need to check you. The Food & Drug Administration has stated that generic warfarin products are interchangeable. However, consult your doctor or pharmacist before switching warfarin products. Be careful not to take more than one medication that contains warfarin unless specifically directed by the doctor or health care provider who is monitoring your warfarin treatment. Older adults may be at greater risk for bleeding while using this drug.  "This medication is not recommended for use during pregnancy because of serious (possibly fatal) harm to an unborn baby. Discuss the use of reliable forms of birth control with your doctor. If you become pregnant or think you may be pregnant, tell your doctor immediately. If you are planning pregnancy, discuss a plan for managing your condition with your doctor before you become pregnant. Your doctor may switch the type of medication you use during pregnancy. Very small amounts of this medication may pass into breast milk but is unlikely to harm a nursing infant. Consult your doctor before breast-feeding.      DRUG INTERACTIONS:  Drug interactions may change how your medications work or increase your risk for serious side effects. This document does not contain all possible drug interactions. Keep a list of all the products you use (including prescription/nonprescription drugs and herbal products) and share it with your doctor and pharmacist. Do not start, stop, or change the dosage of any medicines without your doctor's approval. Warfarin interacts with many prescription, nonprescription, vitamin, and herbal products. This includes medications that are applied to the skin or inside the vagina or rectum. The interactions with warfarin usually result in an increase or decrease in the \"blood-thinning\" (anticoagulant) effect. Your doctor or other health care professional should closely monitor you to prevent serious bleeding or clotting problems. While taking warfarin, it is very important to tell your doctor or pharmacist of any changes in medications, vitamins, or herbal products that you are taking. Some products that may interact with this drug include: capecitabine, imatinib, mifepristone. Aspirin, aspirin-like drugs (salicylates), and nonsteroidal anti-inflammatory drugs (NSAIDs such as ibuprofen, naproxen, celecoxib) may have effects similar to warfarin. These drugs may increase the risk of bleeding problems if " taken during treatment with warfarin. Carefully check all prescription/nonprescription product labels (including drugs applied to the skin such as pain-relieving creams) since the products may contain NSAIDs or salicylates. Talk to your doctor about using a different medication (such as acetaminophen) to treat pain/fever. Low-dose aspirin and related drugs (such as clopidogrel, ticlopidine) should be continued if prescribed by your doctor for specific medical reasons such as heart attack or stroke prevention. Consult your doctor or pharmacist for more details. Many herbal products interact with warfarin. Tell your doctor before taking any herbal products, especially bromelains, coenzyme Q10, cranberry, danshen, dong quai, fenugreek, garlic, ginkgo biloba, ginseng, and Mary's wort, among others. This medication may interfere with a certain laboratory test to measure theophylline levels, possibly causing false test results. Make sure laboratory personnel and all your doctors know you use this drug.      OVERDOSE:  If overdose is suspected, contact a poison control center or emergency room immediately. [place] residents can call the [place] National Poison Hotline at 1-182.614.2209. [place] residents can call a provincial poison control center. Symptoms of overdose may include: bloody/black/tarry stools, pink/dark urine, unusual/prolonged bleeding.      NOTES:  Do not share this medication with others. Laboratory and/or medical tests (such as INR, complete blood count) must be performed periodically to monitor your progress or check for side effects. Consult your doctor for more details.      MISSED DOSE:  For the best possible benefit, do not miss any doses. If you do miss a dose and remember on the same day, take it as soon as you remember. If you remember on the next day, skip the missed dose and resume your usual dosing schedule. Do not double the dose to catch up because this could increase your risk for bleeding. Keep a  record of missed doses to give to your doctor or pharmacist. Contact your doctor or pharmacist if you miss 2 or more doses in a row.      STORAGE:  Store at room temperature away from light and moisture. Do not store in the bathroom. Keep all medications away from children and pets. Do not flush medications down the toilet or pour them into a drain unless instructed to do so. Properly discard this product when it is  or no longer needed. Consult your pharmacist or local waste disposal company for more details about how to safely discard your product.      MEDICAL ALERT:  Your condition and medication can cause complications in a medical emergency. For information about enrolling in MedicAlert, call 1-614.638.6984 ([place]) or 1-141.641.7558 ([place]).      Information last revised 2010 Copyright(c)  [employer]       Pacemaker Implantation, Care After  Refer to this sheet over the next few weeks. These instructions provide you with information on caring for yourself after the procedure. Your health care provider may also give you more specific instructions. Your treatment has been planned according to current medical practices, but problems sometimes occur. Call your health care provider if you have any problems or questions regarding your pacemaker.   WHAT TO EXPECT AFTER THE PROCEDURE  · You may feel pain. Some pain is normal. It may last a few days.  · A slight bump may be seen over the skin where the device was placed. Sometimes, it is possible to feel the device under the skin. This is normal.  · In the months and years afterward, your health care provider will check the device, the leads, and the battery every few months. Eventually, when the battery is low, the device will be replaced.  HOME CARE INSTRUCTIONS  Medicines  · Take medicines only as directed by your health care provider.  · If you were prescribed an antibiotic medicine, finish it all even if you start to feel better.  · Do  not take any other medicines without asking your health care provider first. Some medicines, including certain painkillers, can cause bleeding in your stomach after surgery.  Wound Care  · Do not remove the bandage on your chest until directed to do so by your health care provider.  · After your bandage is removed, you may see pieces of tape called skin adhesive strips over the area where the cut was made (incision site). Let them fall off on their own.  · Check the incision site every day to make sure it is not infected, bleeding, or starting to pull apart.  · Do not use lotions or ointments near the incision site unless directed to do so.  · Keep the incision area clean and dry for 2-3 days after the procedure or as directed by your health care provider. It takes several weeks for the incision site to completely heal.  · Do not take baths, swim, or use a hot tub until your health care provider approves.  Activities  · Try to walk a little every day. Exercising is important after this procedure. It is also important to use your shoulder on the side of the pacemaker in daily tasks that do not require exaggerated motion.  · Avoid sudden jerking, pulling, or chopping movements that pull your upper arm far away from your body for at least 6 weeks.  · Do not lift your upper arm above your shoulders for at least 6 weeks. This means no tennis, golf, or swimming for this period of time. If you sleep with the arm above your head, use a restraint to prevent this from happening as you sleep.  · You may go back to work when your health care provider says it is okay. Check with your health care provider before you start to drive or play sports.  Other Instructions  · Follow diet instructions if they were provided. You should be able to eat what you usually do right away, but you may need to limit your salt intake.  · Weigh yourself every day. If you suddenly gain weight, fluid may be building up in your body.  · Always carry  your pacemaker identification card with you. The card should list the implant date, device model, and . Consider wearing a medical alert bracelet or necklace.  · Tell all health care providers that you have a pacemaker. This may prevent them from giving you a magnetic resource imaging scan (MRI) because of the strong magnets used during that test.  · If you must pass through a metal detector, quickly walk through it. Do not stop under the detector or stand near it.  · Avoid places or objects with a strong electric or magnetic field, including:  ¨ Airport security barry. When at the airport, let officials know you have a pacemaker. Your ID card will let you be checked in a way that is safe for you and that will not damage your pacemaker. Also, do not let a security person wave a magnetic wand near your pacemaker. That can make it stop working.  ¨ Power plants.  ¨ Large electrical generators.  ¨ Radiofrequency transmission towers, such as cell phone and radio towers.  · Do not use amateur (ham) radio equipment or electric (arc) welding torches. Some devices are safe to use if held at least 1 foot from your pacemaker. These include power tools, lawn mowers, and speakers. If you are unsure of whether something is safe to use, ask your health care provider.  · You may safely use electric blankets, heating pads, computers, and microwave ovens.  · When using your cell phone, hold it to the ear opposite the pacemaker. Do not leave your cell phone in a pocket over the pacemaker.  · Keep all follow-up visits as directed by your health care provider. This is how your health care provider makes sure your chest is healing the way it should. Ask your health care provider when you should come back to have your stitches or staples taken out.  · Have your pacemaker checked every 3-6 months or as directed by your health care provider. Most pacemakers last for 4-8 years before a new one is needed.  SEEK MEDICAL CARE  IF:  · You gain weight suddenly.  · Your legs or feet swell more than they have before.  · It feels like your heart is fluttering or skipping beats (heart palpitations).  · You have a fever.  SEEK IMMEDIATE MEDICAL CARE IF:  · You have chest pain.  · You feel more short of breath than you have felt before.  · You feel more light-headed than you have felt before.  · You have problems with your incision site, such as swelling or bleeding, or it starts to open up.  · You have drainage, redness, swelling, or pain at your incision site.     This information is not intended to replace advice given to you by your health care provider. Make sure you discuss any questions you have with your health care provider.     Document Released: 07/07/2006 Document Revised: 01/08/2016 Document Reviewed: 04/19/2013  [employer] Interactive Patient Education ©2016 [employer]    Cardiac Ablation  Cardiac ablation is a procedure to stop some heart tissue from causing problems. The heart has many electrical connections. Sometimes these connections cause the heart to beat very fast or irregularly. Removing some of the problem areas can improve heart rhythm or make it normal. Ablation is done for people who:  · Have Leonel-Parkinson-White syndrome.  · Have other fast heart rhythms (tachycardia).  · Have taken medicines for an abnormal heart rhythm (arrhythmia) and the medicines had:  ¨ No success.  ¨ Side effects.  · May have a type of heartbeat that could cause death.  What happens before the procedure?  · Follow instructions from your doctor about eating and drinking before the procedure.  · Take your medicines as told by your doctor. Take them at regular times with water unless told differently by your doctor.  · If you are taking diabetes medicine, ask your doctor how to take it. Ask if there are any special instructions you should follow. Your doctor may change how much insulin you take the day of the procedure.  What happens during the  procedure?  · A special type of X-ray will be used. The X-ray helps your doctor see images of your heart during the procedure.  · A small cut (incision) will be made in your neck or groin.  · An IV tube will be started before the procedure begins.  · You will be given a numbing medicine (anesthetic) or a medicine to help you relax (sedative).  · The skin on your neck or groin will be numbed.  · A needle will be put into a large vein in your neck or groin.  · A thin, flexible tube (catheter) will be put in to reach your heart.  · A dye will be put in the tube. The dye will show up on X-rays. It will help your doctor see the area of the heart that needs treatment.  · When the heart tissue that is causing problems is found, the tip of the tube will send an electrical current to it. This will stop it from causing problems.  · The tube will be taken out.  · Pressure will be put on the area where the tube was. This will keep it from bleeding. A bandage will be placed over the area.  What happens after the procedure?  · You will be taken to a recovery area. Your blood pressure, heart rate, and breathing will be watched. The area where the tube was will also be watched for bleeding.  · You will need to lie still for 4-6 hours. This keeps the area where the tube was from bleeding.  This information is not intended to replace advice given to you by your health care provider. Make sure you discuss any questions you have with your health care provider.  Document Released: 08/20/2014 Document Revised: 05/25/2017 Document Reviewed: 05/15/2014  [employer] Interactive Patient Education © 2017 [employer]        Minimaze Procedure  Introduction  Minimaze procedure is a surgery to treat a type of irregular heartbeat called atrial fibrillation. The surgery helps return the heartbeat to normal, which may reduce the long-term risk of stroke and may eliminate the need to take blood thinners.  Atrial fibrillation happens when the heart  gets too many signals telling it to beat. During the minimaze procedure, thin lines of heart tissue in the back of the heart where the extra signals come from are destroyed.  Tell a health care provider about:  · Any allergies you have.  · All medicines you are taking, including vitamins, herbs, eye drops, creams, and over-the-counter medicines.  · Any problems you or family members have had with anesthetic medicines.  · Any blood disorders you have.  · Any history of smoking.  · Any surgeries you have had.  · Any medical conditions you have.  · Whether you are pregnant or may be pregnant.  What are the risks?  Generally, this is a safe procedure. However, problems may occur, including:  · Stroke.  · Heart attack.  · Damage to other structures or organs.  · Infection.  · Blood clot.  · Severe bleeding (hemorrhage).  · Scarring.  · Vein or tissue inflammation.  · Allergic reactions to medicines.  What happens before the procedure?  · Follow instructions from your health care provider about eating or drinking restrictions.  · Ask your health care provider about:  ¨ Changing or stopping your regular medicines. This is especially important if you are taking diabetes medicines or blood thinners.  ¨ Taking medicines such as aspirin and ibuprofen. These medicines can thin your blood. Do not take these medicines before your procedure if your health care provider instructs you not to.  · Do not use any tobacco products, such as cigarettes, chewing tobacco, and e-cigarettes for as long as possible before your procedure. Try to stop at least 3-6 weeks before your procedure. If you need help quitting, ask your health care provider.  · Avoid wearing makeup, jewelry, and accessories the day of the procedure.  · You may have a physical exam.  · You may have imaging tests.  · You may be given antibiotic medicine to help prevent infection.  · Plan to have someone take you home after the procedure.  · If you will be going home right  after the procedure, plan to have someone with you for 24 hours.  What happens during the procedure?  · To reduce your risk of infection:  ¨ Your health care team will wash or sanitize their hands.  ¨ Your skin will be washed with soap.  · You will be given one or more of the following:  ¨ A medicine to help you relax (sedative).  ¨ A medicine to numb the area (local anesthetic).  ¨ A medicine to make you fall asleep (general anesthetic).  · Up to four incisions will be made on one side of your chest.  · A tube with a tiny camera on it will be placed into one of your incisions so that your surgeon can see your heart. Surgical tools will be put into the other incisions.  · One of the tools will be used to stimulate parts of your heart to see where the irregular signals start.  · The heart tissue around the area where the signals start will be destroyed.  · Incisions will be made on the other side of your chest.  · The same tools will be used to stimulate your heart and destroy heart tissue on this side of your chest.  · A part of your heart called the left atrial appendage may be closed or removed. This is the part of the heart where blood clots form most often. Closing or removing this part of your heart may reduce your risk of having a stroke.  · The tools will be removed.  · A chest tube will be inserted through an incision to drain the surgical area.  · The remaining incisions will be closed with stitches or staples.  The procedure may vary among health care providers and hospitals.  What happens after the procedure?  · Your blood pressure, heart rate, breathing rate, and blood oxygen level will be monitored often until the medicines you were given have worn off.  · You will be given medicines to help relieve pain.  · Your chest tubes will be watched closely for signs of fluid or air buildup around the lungs. The tubes will be removed when it is safe, and the incision will be closed with stitches or  staples.  · Do not drive for 24 hours if you received a sedative.  This information is not intended to replace advice given to you by your health care provider. Make sure you discuss any questions you have with your health care provider.  Document Released: 09/11/2013 Document Revised: 05/25/2017 Document Reviewed: 12/11/2016  © 2017 [employer]    Atrial Fibrillation  Introduction  Atrial fibrillation is a type of heartbeat that is irregular or fast (rapid). If you have this condition, your heart keeps quivering in a weird (chaotic) way. This condition can make it so your heart cannot pump blood normally. Having this condition gives a person more risk for stroke, heart failure, and other heart problems. There are different types of atrial fibrillation. Talk with your doctor to learn about the type that you have.  Follow these instructions at home:  · Take over-the-counter and prescription medicines only as told by your doctor.  · If your doctor prescribed a blood-thinning medicine, take it exactly as told. Taking too much of it can cause bleeding. If you do not take enough of it, you will not have the protection that you need against stroke and other problems.  · Do not use any tobacco products. These include cigarettes, chewing tobacco, and e-cigarettes. If you need help quitting, ask your doctor.  · If you have apnea (obstructive sleep apnea), manage it as told by your doctor.  · Do not drink alcohol.  · Do not drink beverages that have caffeine. These include coffee, soda, and tea.  · Maintain a healthy weight. Do not use diet pills unless your doctor says they are safe for you. Diet pills may make heart problems worse.  · Follow diet instructions as told by your doctor.  · Exercise regularly as told by your doctor.  · Keep all follow-up visits as told by your doctor. This is important.  Contact a doctor if:  · You notice a change in the speed, rhythm, or strength of your heartbeat.  · You are taking a  blood-thinning medicine and you notice more bruising.  · You get tired more easily when you move or exercise.  Get help right away if:  · You have pain in your chest or your belly (abdomen).  · You have sweating or weakness.  · You feel sick to your stomach (nauseous).  · You notice blood in your throw up (vomit), poop (stool), or pee (urine).  · You are short of breath.  · You suddenly have swollen feet and ankles.  · You feel dizzy.  · Your suddenly get weak or numb in your face, arms, or legs, especially if it happens on one side of your body.  · You have trouble talking, trouble understanding, or both.  · Your face or your eyelid droops on one side.  These symptoms may be an emergency. Do not wait to see if the symptoms will go away. Get medical help right away. Call your local emergency services (911 in the [place] Do not drive yourself to the hospital.   This information is not intended to replace advice given to you by your health care provider. Make sure you discuss any questions you have with your health care provider.  Document Released: 09/26/2009 Document Revised: 05/25/2017 Document Reviewed: 04/13/2016  © 2017 [employer]              Depression / Suicide Risk    As you are discharged from this [facility] facility, it is important to learn how to keep safe from harming yourself.    Recognize the warning signs:  · Abrupt changes in personality, positive or negative- including increase in energy   · Giving away possessions  · Change in eating patterns- significant weight changes-  positive or negative  · Change in sleeping patterns- unable to sleep or sleeping all the time   · Unwillingness or inability to communicate  · Depression  · Unusual sadness, discouragement and loneliness  · Talk of wanting to die  · Neglect of personal appearance   · Rebelliousness- reckless behavior  · Withdrawal from people/activities they love  · Confusion- inability to concentrate     If you or a loved one observes any of  these behaviors or has concerns about self-harm, here's what you can do:  · Talk about it- your feelings and reasons for harming yourself  · Remove any means that you might use to hurt yourself (examples: pills, rope, extension cords, firearm)  · Get professional help from the community (Mental Health, Substance Abuse, psychological counseling)  · Do not be alone:Call your Safe Contact- someone whom you trust who will be there for you.  · Call your local CRISIS HOTLINE 159-9250 or 839-826-4005  · Call your local Children's Mobile Crisis Response Team [place] (310) 486-1972 or www.[employer]  · Call the toll free National Suicide Prevention Hotlines   · National Suicide Prevention Lifeline 750-119-DZLG (5703)  · National Hope Line Network 800-SUICIDE (316-2698)

## 2018-07-02 ENCOUNTER — PATIENT OUTREACH (OUTPATIENT)
Dept: HEALTH INFORMATION MANAGEMENT | Facility: OTHER | Age: 78
End: 2018-07-02

## 2018-07-02 NOTE — PROGRESS NOTES
"Cardiology/Electrophysiology Follow-up Note      Subjective:   Chief Complaint:   Chief Complaint   Patient presents with   • Atrial Fibrillation     F/V DX:PAF       Donte Magaña is a 77 y.o. female who presents today for hospital follow up after admission for Atrial arrhythmias with RVR, presyncope.  She had a PPM (Reading, dual chamber) placed during admission and her beta blockers were increased.  During her admission she was cardioverted and attempted pace termination of her AT with prompt recurrence of her arrhythmia.      She is followed by Dr. Matthews and Dr. Lizzy Haywood.  Past medical history significant for CAD, Hyperlipidemia, chronic anticoagulation use, hypothyroidism.      Today in follow up she states that she continues to feel intermittent dizziness.  She has not had any chest pain, dyspnea, PND, or orthopnea, or syncope.  She has lower extremity edema and is on diuretics.    She has been taking it easy at home.  Her  who had a recent tavr and emergent abdominal surgery for bowel obstruction has been taking care of her.        Past Medical History:   Diagnosis Date   • A-fib (HCC)    • Anesthesia     \"Tachycardia for 5 days after cataract surgery\"   • Anticoagulant long-term use 1/12/2012   • Arthritis     Knees, hips   • Asthma     with pneumonia, nothing for 3 years   • Atrial fibrillation (HCC)    • Backpain    • Breath shortness     with exertion O2  2l/m PRN, hasnt used for 3 years   • Bronchitis Nov, 2013   • CAD (coronary artery disease)    • Depression    • Glaucoma 5/3/2011   • Hematoma complicating a procedure 11/3/2012   • High cholesterol    • Hypertension    • Hypothyroid    • Lupus    • Macular degeneration    • Menopause 1/12/2012   • Mitral regurgitation 10/30/2012   • Obesity 1/12/2012   • Pneumonia feb,2013   • Pre-syncope 6/29/2018   • Pulmonary hypertension (HCC) 10/30/2012   • PVC's (premature ventricular contractions) 1/12/2012   • Senile nuclear sclerosis    • " Spinal stenosis of lumbar region at multiple levels    • Unspecified cataract     repaired bilateral   • Unspecified urinary incontinence      Past Surgical History:   Procedure Laterality Date   • KNEE ARTHROPLASTY TOTAL Right 6/23/2016    Procedure: KNEE ARTHROPLASTY TOTAL;  Surgeon: Heriberto Lozada M.D.;  Location: SURGERY [facility];  Service:    • KNEE ARTHROPLASTY TOTAL Left 5/28/2015    Procedure: KNEE ARTHROPLASTY TOTAL;  Surgeon: Heriberto Lozada M.D.;  Location: SURGERY [facility];  Service:    • CATARACT PHACO WITH IOL Right 5/5/2015    Procedure: IOL OD - STANDARD;  Surgeon: Dmitry Bejarano M.D.;  Location: SURGERY [facility];  Service:    • CATARACT PHACO WITH IOL  4/21/2015    Performed by Dmitry Bejarano M.D. at [facility]   • RECOVERY  11/30/2010    Performed by SURGERY, [facility]-RECOVERY at [facility] SAME DAY [facility]   • COLONOSCOPY  2008    Normal    [facility]   • ABDOMINAL HYSTERECTOMY TOTAL  April 15,1975    still has ovaries   • OPEN REDUCTION      left ankle   • OTHER      Removed pins from left ankle   • TONSILLECTOMY AND ADENOIDECTOMY       Family History   Problem Relation Age of Onset   • Stroke Mother    • Diabetes Father    • Stroke Sister    • Heart Disease Brother    • Stroke Sister    • GI Daughter      Crohn's Disease   • Heart Disease Daughter      CHF   • Other Daughter      Chronic Pain--Lymphedema   • Cancer Paternal Aunt      Social History     Social History   • Marital status:      Spouse name: N/A   • Number of children: N/A   • Years of education: N/A     Occupational History   • Not on file.     Social History Main Topics   • Smoking status: Never Smoker   • Smokeless tobacco: Never Used   • Alcohol use No   • Drug use: No   • Sexual activity: Not Currently     Partners: Male     Birth control/ protection: Post-Menopausal     Other Topics Concern   • Not on file     Social History Narrative   • No narrative on file     Allergies  "  Allergen Reactions   • Amiodarone Hives     Throat and tongue itching   • Bactrim Shortness of Breath   • Claritin-D [Loratadine-Pseudoephedrine] Palpitations   • Clotrimazole      Stinging / burning on chest   • Metoprolol      Causes throat swelling   • Morphine      Hallucinations   • Qvar [Beclomethasone Dipropionate]      Pressure on heart     • Vibramycin Shortness of Breath   • Atorvastatin Calcium-Polysorbate 80      Muscle aches     • Augmentin      Unknown reaction   • Cipro Xr Swelling   • Diltiazem      rash   • Flecainide      dizziness   • Keflex Unspecified     Pt states \"Unsure\".   • Mucinex      GI Distress     • Tramadol      crying   • Tape Rash     Paper tape okay       Current Outpatient Prescriptions   Medication Sig Dispense Refill   • atorvastatin (LIPITOR) 10 MG Tab Take 1 Tab by mouth every day. 30 Tab 1   • atenolol (TENORMIN) 50 MG Tab Take 1 Tab by mouth 2 times a day. 60 Tab 2   • spironolactone (ALDACTONE) 50 MG Tab Take 25 mg by mouth every day.     • furosemide (LASIX) 40 MG Tab Take 2 Tabs by mouth every day. 80 mg in the AM 60 Tab 3   • levothyroxine (SYNTHROID) 50 MCG Tab TAKE 1 TABLET BY MOUTH EVERY MORNING ON AN EMPTY STOMACH. 90 Tab 3   • raNITidine (ZANTAC) 150 MG Tab TAKE ONE TABLET BY MOUTH TWICE DAILY 180 Tab 3   • lisinopril (PRINIVIL) 5 MG Tab TAKE ONE TABLET BY MOUTH EVERY DAY 90 Tab 3   • estradiol (ESTRACE) 2 MG Tab TAKE ONE TABLET BY MOUTH EVERY DAY 90 Tab 3   • magnesium oxide (MAG-OX) 400 (241.3 Mg) MG Tab tablet Take 1 Tab by mouth every day. 30 Tab 6   • warfarin (COUMADIN) 2.5 MG Tab Take 2.5-3.75 mg by mouth every day. 3.75 mg on Monday and Wednesday  2.5 mg all other days     • sertraline (ZOLOFT) 25 MG tablet Take 1 Tab by mouth every day. 90 Tab 3   • digoxin (LANOXIN) 125 MCG Tab Take 1 Tab by mouth every day. 90 Tab 3   • Multiple Vitamins-Minerals (ICAPS AREDS 2) Cap Take 2 Caps by mouth 2 Times a Day. 100 Cap 3   • acetaminophen (TYLENOL) 500 MG TABS " "Take 1,000 mg by mouth 3 times a day. Indications: Pain     • vitamin D (CHOLECALCIFEROL) 1000 UNIT TABS Take 2,000 Units by mouth every day.       No current facility-administered medications for this visit.        Review of Systems   Constitutional: Negative for chills, fever, malaise/fatigue and weight loss.   HENT: Negative for congestion, nosebleeds, sore throat and tinnitus.    Eyes: Negative for blurred vision and double vision.   Respiratory: Negative for cough, hemoptysis, sputum production, shortness of breath, wheezing and stridor.    Cardiovascular: Negative for chest pain, palpitations, orthopnea, leg swelling and PND.   Gastrointestinal: Negative for abdominal pain, blood in stool, diarrhea, heartburn, melena, nausea and vomiting.   Skin: Negative for rash.   Neurological: Positive for dizziness (occasional). Negative for tingling, tremors, sensory change, speech change, focal weakness, loss of consciousness, weakness and headaches.     All others systems reviewed and negative.     Objective:     Blood pressure 132/76, pulse (!) 135, height 1.626 m (5' 4.02\"), weight 79.8 kg (176 lb), last menstrual period 01/01/1993, SpO2 95 %. Body mass index is 30.2 kg/m².    Physical Exam   Constitutional: She is oriented to person, place, and time and well-developed, well-nourished, and in no distress.   HENT:   Head: Normocephalic and atraumatic.   Eyes: Conjunctivae and EOM are normal. Pupils are equal, round, and reactive to light.   Neck: Normal range of motion. Neck supple. No JVD present.   Cardiovascular: Normal heart sounds and intact distal pulses.  An irregular rhythm present. Tachycardia present.  Exam reveals no gallop and no friction rub.    No murmur heard.  Pulmonary/Chest: Effort normal and breath sounds normal. No respiratory distress. She has no wheezes. She has no rales. She exhibits no tenderness.   Abdominal: Soft. Bowel sounds are normal. There is no tenderness.   Musculoskeletal: Normal " range of motion. She exhibits edema (bilateral pedal, 1+).   Neurological: She is alert and oriented to person, place, and time.   Skin: Skin is warm and dry. No rash noted. No erythema.   Psychiatric: Mood, memory, affect and judgment normal.         Cardiac Imaging and Procedures Review:    EKG dated 7/3/18:   AFL/AT, ventricular rate 129.    Echo dated 7/1/18:   Left Ventricle  Normal left ventricular size, wall thickness, and systolic function. Diastolic function is difficult to assess with atrial fibrillation.  Right Ventricle  Normal right ventricular size and systolic function.  Right Atrium  Normal right atrial size.  Left Atrium  Normal left atrial size.  Mitral Valve  Mitral annular calcification. No mitral stenosis. Mild mitral regurgitation.  Aortic Valve  Structurally normal aortic valve without significant stenosis or regurgitation. Structurally normal aortic valve.  Tricuspid Valve  Structurally normal tricuspid valve. Mild tricuspid regurgitation.  Right ventricular systolic pressure is estimated to be 33 mmHg. Right atrial pressure is estimated to be 3 mmHg.  Pulmonic Valve  Structurally normal pulmonic valve without significant stenosis or regurgitation.  Pericardium  Normal pericardium without effusion.  Aorta  Normal aortic root for body surface area.    Labs (personally reviewed and notable for):   Lab Results   Component Value Date/Time    SODIUM 139 07/01/2018 03:34 AM    POTASSIUM 4.6 07/01/2018 03:34 AM    CHLORIDE 106 07/01/2018 03:34 AM    CO2 25 07/01/2018 03:34 AM    GLUCOSE 109 (H) 07/01/2018 03:34 AM    BUN 16 07/01/2018 03:34 AM    CREATININE 0.88 07/01/2018 03:34 AM      Lab Results   Component Value Date/Time    WBC 10.0 06/30/2018 03:59 AM    RBC 4.51 06/30/2018 03:59 AM    HEMOGLOBIN 13.9 06/30/2018 03:59 AM    HEMATOCRIT 43.6 06/30/2018 03:59 AM    MCV 96.7 06/30/2018 03:59 AM    MCH 30.8 06/30/2018 03:59 AM    MCHC 31.9 (L) 06/30/2018 03:59 AM    MPV 10.2 06/30/2018 03:59 AM     NEUTSPOLYS 51.20 06/30/2018 03:59 AM    LYMPHOCYTES 35.50 06/30/2018 03:59 AM    MONOCYTES 9.10 06/30/2018 03:59 AM    EOSINOPHILS 3.30 06/30/2018 03:59 AM    BASOPHILS 0.60 06/30/2018 03:59 AM      PT/INR:   Lab Results   Component Value Date/Time    PROTHROMBTM 29.8 (H) 07/01/2018 03:34 AM    INR 2.87 (H) 07/01/2018 03:34 AM   ]      Assessment:     1. Persistent atrial fibrillation (HCC)  EKG    COMP METABOLIC PANEL    BTYPE NATRIURETIC PEPTIDE   2. Atrial fibrillation status post cardioversion (HCC)  EKG   3. Atrial flutter, unspecified type (HCC)     4. Pacemaker     5. Coronary artery disease involving native coronary artery of native heart without angina pectoris         Medical Decision Making:  Today's Assessment / Status / Plan:   1. Atrial arrhythmias:  - Likely AFL/Atach.  Rhythm has been persistent.  She was recently admitted to the hospital with RVR attempts at cardioversion and pace termination unsuccessful and subsequently discharged with elevated heart rates on increased dose of Atenolol.    - Will increase Atenolol to 75 mg PO BID at this time.  She will continue to monitor her heart rates.  If still elevated in a few days can consider increasing to 100 mg PO BID as long as her blood pressure is ok.   - Will get her scheduled for AFL/AT ablation (+) AV izzy ablation in the next 2-4 weeks with Dr. Matthews.   - Repeat CMP and BNP prior to appt next week.   - Continue coumadin, lasix, aldactone.      2. PPM:  - Left chest wound site is clear.  - No testing today secondary to elevated heart rates.  - Will be back next week for her one week check.   - Advised to keep dressing on and dry and monitor of swelling ,redness, fever.      3. CAD:  - No chest pain or angina. Stable.     4. Anticoagualtion:  - Followed by [facility]in [facility].  Denies any signs of internal bleeding.    Discussed use of EMS if needed and she verbalizes understanding.   Plan reviewed in detail with the patient and she verbalizes  understanding and is in agreement.   RTC in 1 week for follow up, sooner if needed.  Collaborating MD: Case reviewed with Dr. Matthews via phone.     TIMUR Winslow.

## 2018-07-03 ENCOUNTER — OFFICE VISIT (OUTPATIENT)
Dept: CARDIOLOGY | Facility: MEDICAL CENTER | Age: 78
End: 2018-07-03
Payer: MEDICARE

## 2018-07-03 ENCOUNTER — ANTICOAGULATION MONITORING (OUTPATIENT)
Dept: VASCULAR LAB | Facility: MEDICAL CENTER | Age: 78
End: 2018-07-03

## 2018-07-03 VITALS
DIASTOLIC BLOOD PRESSURE: 76 MMHG | OXYGEN SATURATION: 95 % | HEART RATE: 135 BPM | WEIGHT: 176 LBS | BODY MASS INDEX: 30.05 KG/M2 | SYSTOLIC BLOOD PRESSURE: 132 MMHG | HEIGHT: 64 IN

## 2018-07-03 DIAGNOSIS — I25.10 CORONARY ARTERY DISEASE INVOLVING NATIVE CORONARY ARTERY OF NATIVE HEART WITHOUT ANGINA PECTORIS: ICD-10-CM

## 2018-07-03 DIAGNOSIS — Z79.01 CHRONIC ANTICOAGULATION: ICD-10-CM

## 2018-07-03 DIAGNOSIS — I48.92 ATRIAL FLUTTER, UNSPECIFIED TYPE (HCC): ICD-10-CM

## 2018-07-03 DIAGNOSIS — I48.91 ATRIAL FIBRILLATION STATUS POST CARDIOVERSION (HCC): ICD-10-CM

## 2018-07-03 DIAGNOSIS — I48.19 PERSISTENT ATRIAL FIBRILLATION (HCC): ICD-10-CM

## 2018-07-03 DIAGNOSIS — Z95.0 PACEMAKER: ICD-10-CM

## 2018-07-03 LAB — INR PPP: 1.9 (ref 2–3.5)

## 2018-07-03 PROCEDURE — 99214 OFFICE O/P EST MOD 30 MIN: CPT | Mod: 25 | Performed by: NURSE PRACTITIONER

## 2018-07-03 PROCEDURE — 93000 ELECTROCARDIOGRAM COMPLETE: CPT | Performed by: INTERNAL MEDICINE

## 2018-07-03 RX ORDER — MAGNESIUM OXIDE 400 MG/1
TABLET ORAL
COMMUNITY
Start: 2018-05-05 | End: 2018-10-30

## 2018-07-03 RX ORDER — ATENOLOL 50 MG/1
75 TABLET ORAL 2 TIMES DAILY
Qty: 180 TAB | Refills: 1 | Status: SHIPPED | OUTPATIENT
Start: 2018-07-03 | End: 2018-08-23 | Stop reason: SDUPTHER

## 2018-07-03 RX ORDER — ATENOLOL 50 MG/1
75 TABLET ORAL DAILY
Qty: 180 TAB | Refills: 0 | Status: SHIPPED | OUTPATIENT
Start: 2018-07-03 | End: 2018-07-03 | Stop reason: CLARIF

## 2018-07-03 ASSESSMENT — ENCOUNTER SYMPTOMS
WEIGHT LOSS: 0
TINGLING: 0
TREMORS: 0
NAUSEA: 0
WEAKNESS: 0
FEVER: 0
PND: 0
STRIDOR: 0
BLOOD IN STOOL: 0
HEMOPTYSIS: 0
SORE THROAT: 0
VOMITING: 0
PALPITATIONS: 0
CHILLS: 0
HEARTBURN: 0
DOUBLE VISION: 0
ABDOMINAL PAIN: 0
SPUTUM PRODUCTION: 0
COUGH: 0
SHORTNESS OF BREATH: 0
ORTHOPNEA: 0
SENSORY CHANGE: 0
DIZZINESS: 1
DIARRHEA: 0
SPEECH CHANGE: 0
BLURRED VISION: 0
LOSS OF CONSCIOUSNESS: 0
FOCAL WEAKNESS: 0
HEADACHES: 0
WHEEZING: 0

## 2018-07-03 NOTE — PROGRESS NOTES
OP Telephone Anticoagulation Service Note    Date: 7/3/2018      Anticoagulation Summary  As of 7/3/2018    INR goal:   2.0-3.0   TTR:   70.1 % (3 y)   Today's INR:   1.9!   Warfarin maintenance plan:   3.75 mg (2.5 mg x 1.5) on Mon; 2.5 mg (2.5 mg x 1) all other days   Weekly warfarin total:   18.75 mg   Plan last modified:   Ivone Barraza PharmD (5/8/2018)   Next INR check:   7/10/2018   Target end date:   Indefinite    Indications    Atrial fibrillation (HCC) (Resolved) [I48.91]  Chronic anticoagulation [Z79.01]             Anticoagulation Episode Summary     INR check location:   Home Draw    Preferred lab:       Send INR reminders to:       Comments:   Winston YEAGER      Anticoagulation Care Providers     Provider Role Specialty Phone number    Lizzy Haywood M.D. Referring Cardiology [phone]    [facility] Responsible  [phone]    Peter HollowayD Responsible          Anticoagulation Patient Findings        Plan: Spoke with patient on the phone. Patient is subtherapeutic today. Confirmed dosing. She was recently in hospital for syncope. Her INR was low and she was given lower doses of warfarin than he usual dose while there. She was started on lipitor but pt reports not taking d/t it causing myalgias in the past. Her dose of atenolol was increased. Patient denies any signs or symptoms of bleeding or clotting. Instructed patient to call clinic if any unusual bleeding or bruising occurs. Will not adjust warfarin as INR supratherapeutic last week and dose adjusted in hospital, will continue dosing as outlined. Will follow-up with patient in 1 week(s).      Ivone Barraza, Tim

## 2018-07-03 NOTE — LETTER
"     [facility]   [address]  [place] [address]  Phone: [phone]  Fax: [phone]              Donte Magaña  1940    Encounter Date: 7/3/2018    VICTORIANO Winslow          PROGRESS NOTE:  Cardiology/Electrophysiology Follow-up Note      Subjective:   Chief Complaint:   Chief Complaint   Patient presents with   • Atrial Fibrillation     F/V DX:PAF       Donte Magaña is a 77 y.o. female who presents today for hospital follow up after admission for Atrial arrhythmias with RVR, presyncope.  She had a PPM ([place], dual chamber) placed during admission and her beta blockers were increased.  During her admission she was cardioverted and attempted pace termination of her AT with prompt recurrence of her arrhythmia.      She is followed by Dr. Matthews and Dr. Lizzy Haywood.  Past medical history significant for CAD, Hyperlipidemia, chronic anticoagulation use, hypothyroidism.      Today in follow up she states that she continues to feel intermittent dizziness.  She has not had any chest pain, dyspnea, PND, or orthopnea, or syncope.  She has lower extremity edema and is on diuretics.    She has been taking it easy at home.  Her  who had a recent tavr and emergent abdominal surgery for bowel obstruction has been taking care of her.        Past Medical History:   Diagnosis Date   • A-fib (HCC)    • Anesthesia     \"Tachycardia for 5 days after cataract surgery\"   • Anticoagulant long-term use 1/12/2012   • Arthritis     Knees, hips   • Asthma     with pneumonia, nothing for 3 years   • Atrial fibrillation (HCC)    • Backpain    • Breath shortness     with exertion O2  2l/m PRN, hasnt used for 3 years   • Bronchitis Nov, 2013   • CAD (coronary artery disease)    • Depression    • Glaucoma 5/3/2011   • Hematoma complicating a procedure 11/3/2012   • High cholesterol    • Hypertension    • Hypothyroid    • Lupus    • Macular degeneration    • " Menopause 1/12/2012   • Mitral regurgitation 10/30/2012   • Obesity 1/12/2012   • Pneumonia feb,2013   • Pre-syncope 6/29/2018   • Pulmonary hypertension (HCC) 10/30/2012   • PVC's (premature ventricular contractions) 1/12/2012   • Senile nuclear sclerosis    • Spinal stenosis of lumbar region at multiple levels    • Unspecified cataract     repaired bilateral   • Unspecified urinary incontinence      Past Surgical History:   Procedure Laterality Date   • KNEE ARTHROPLASTY TOTAL Right 6/23/2016    Procedure: KNEE ARTHROPLASTY TOTAL;  Surgeon: Heriberto Lozada M.D.;  Location: SURGERY [facility];  Service:    • KNEE ARTHROPLASTY TOTAL Left 5/28/2015    Procedure: KNEE ARTHROPLASTY TOTAL;  Surgeon: Heriberto Lozada M.D.;  Location: SURGERY [facility];  Service:    • CATARACT PHACO WITH IOL Right 5/5/2015    Procedure: IOL OD - STANDARD;  Surgeon: Dmitry Bejarano M.D.;  Location: [facility];  Service:    • CATARACT PHACO WITH IOL  4/21/2015    Performed by Dmitry Bejarano M.D. at [facility]   • RECOVERY  11/30/2010    Performed by SURGERY, CATH-RECOVERY at SURGERY SAME DAY [facility]   • COLONOSCOPY  2008    Normal    [facility]   • ABDOMINAL HYSTERECTOMY TOTAL  April 15,1975    still has ovaries   • OPEN REDUCTION      left ankle   • OTHER      Removed pins from left ankle   • TONSILLECTOMY AND ADENOIDECTOMY       Family History   Problem Relation Age of Onset   • Stroke Mother    • Diabetes Father    • Stroke Sister    • Heart Disease Brother    • Stroke Sister    • GI Daughter      Crohn's Disease   • Heart Disease Daughter      CHF   • Other Daughter      Chronic Pain--Lymphedema   • Cancer Paternal Aunt      Social History     Social History   • Marital status:      Spouse name: N/A   • Number of children: N/A   • Years of education: N/A     Occupational History   • Not on file.     Social History Main Topics   • Smoking status: Never Smoker   • Smokeless  "tobacco: Never Used   • Alcohol use No   • Drug use: No   • Sexual activity: Not Currently     Partners: Male     Birth control/ protection: Post-Menopausal     Other Topics Concern   • Not on file     Social History Narrative   • No narrative on file     Allergies   Allergen Reactions   • Amiodarone Hives     Throat and tongue itching   • Bactrim Shortness of Breath   • Claritin-D [Loratadine-Pseudoephedrine] Palpitations   • Clotrimazole      Stinging / burning on chest   • Metoprolol      Causes throat swelling   • Morphine      Hallucinations   • Qvar [Beclomethasone Dipropionate]      Pressure on heart     • Vibramycin Shortness of Breath   • Atorvastatin Calcium-Polysorbate 80      Muscle aches     • Augmentin      Unknown reaction   • Cipro Xr Swelling   • Diltiazem      rash   • Flecainide      dizziness   • Keflex Unspecified     Pt states \"Unsure\".   • Mucinex      GI Distress     • Tramadol      crying   • Tape Rash     Paper tape okay       Current Outpatient Prescriptions   Medication Sig Dispense Refill   • atorvastatin (LIPITOR) 10 MG Tab Take 1 Tab by mouth every day. 30 Tab 1   • atenolol (TENORMIN) 50 MG Tab Take 1 Tab by mouth 2 times a day. 60 Tab 2   • spironolactone (ALDACTONE) 50 MG Tab Take 25 mg by mouth every day.     • furosemide (LASIX) 40 MG Tab Take 2 Tabs by mouth every day. 80 mg in the AM 60 Tab 3   • levothyroxine (SYNTHROID) 50 MCG Tab TAKE 1 TABLET BY MOUTH EVERY MORNING ON AN EMPTY STOMACH. 90 Tab 3   • raNITidine (ZANTAC) 150 MG Tab TAKE ONE TABLET BY MOUTH TWICE DAILY 180 Tab 3   • lisinopril (PRINIVIL) 5 MG Tab TAKE ONE TABLET BY MOUTH EVERY DAY 90 Tab 3   • estradiol (ESTRACE) 2 MG Tab TAKE ONE TABLET BY MOUTH EVERY DAY 90 Tab 3   • magnesium oxide (MAG-OX) 400 (241.3 Mg) MG Tab tablet Take 1 Tab by mouth every day. 30 Tab 6   • warfarin (COUMADIN) 2.5 MG Tab Take 2.5-3.75 mg by mouth every day. 3.75 mg on Monday and Wednesday  2.5 mg all other days     • sertraline (ZOLOFT) " "25 MG tablet Take 1 Tab by mouth every day. 90 Tab 3   • digoxin (LANOXIN) 125 MCG Tab Take 1 Tab by mouth every day. 90 Tab 3   • Multiple Vitamins-Minerals (ICAPS AREDS 2) Cap Take 2 Caps by mouth 2 Times a Day. 100 Cap 3   • acetaminophen (TYLENOL) 500 MG TABS Take 1,000 mg by mouth 3 times a day. Indications: Pain     • vitamin D (CHOLECALCIFEROL) 1000 UNIT TABS Take 2,000 Units by mouth every day.       No current facility-administered medications for this visit.        Review of Systems   Constitutional: Negative for chills, fever, malaise/fatigue and weight loss.   HENT: Negative for congestion, nosebleeds, sore throat and tinnitus.    Eyes: Negative for blurred vision and double vision.   Respiratory: Negative for cough, hemoptysis, sputum production, shortness of breath, wheezing and stridor.    Cardiovascular: Negative for chest pain, palpitations, orthopnea, leg swelling and PND.   Gastrointestinal: Negative for abdominal pain, blood in stool, diarrhea, heartburn, melena, nausea and vomiting.   Skin: Negative for rash.   Neurological: Positive for dizziness (occasional). Negative for tingling, tremors, sensory change, speech change, focal weakness, loss of consciousness, weakness and headaches.     All others systems reviewed and negative.     Objective:     Blood pressure 132/76, pulse (!) 135, height 1.626 m (5' 4.02\"), weight 79.8 kg (176 lb), last menstrual period 01/01/1993, SpO2 95 %. Body mass index is 30.2 kg/m².    Physical Exam   Constitutional: She is oriented to person, place, and time and well-developed, well-nourished, and in no distress.   HENT:   Head: Normocephalic and atraumatic.   Eyes: Conjunctivae and EOM are normal. Pupils are equal, round, and reactive to light.   Neck: Normal range of motion. Neck supple. No JVD present.   Cardiovascular: Normal heart sounds and intact distal pulses.  An irregular rhythm present. Tachycardia present.  Exam reveals no gallop and no friction rub.    "   No murmur heard.  Pulmonary/Chest: Effort normal and breath sounds normal. No respiratory distress. She has no wheezes. She has no rales. She exhibits no tenderness.   Abdominal: Soft. Bowel sounds are normal. There is no tenderness.   Musculoskeletal: Normal range of motion. She exhibits edema (bilateral pedal, 1+).   Neurological: She is alert and oriented to person, place, and time.   Skin: Skin is warm and dry. No rash noted. No erythema.   Psychiatric: Mood, memory, affect and judgment normal.         Cardiac Imaging and Procedures Review:    EKG dated 7/3/18:   AFL/AT, ventricular rate 129.    Echo dated 7/1/18:   Left Ventricle  Normal left ventricular size, wall thickness, and systolic function. Diastolic function is difficult to assess with atrial fibrillation.  Right Ventricle  Normal right ventricular size and systolic function.  Right Atrium  Normal right atrial size.  Left Atrium  Normal left atrial size.  Mitral Valve  Mitral annular calcification. No mitral stenosis. Mild mitral regurgitation.  Aortic Valve  Structurally normal aortic valve without significant stenosis or regurgitation. Structurally normal aortic valve.  Tricuspid Valve  Structurally normal tricuspid valve. Mild tricuspid regurgitation.  Right ventricular systolic pressure is estimated to be 33 mmHg. Right atrial pressure is estimated to be 3 mmHg.  Pulmonic Valve  Structurally normal pulmonic valve without significant stenosis or regurgitation.  Pericardium  Normal pericardium without effusion.  Aorta  Normal aortic root for body surface area.    Labs (personally reviewed and notable for):   Lab Results   Component Value Date/Time    SODIUM 139 07/01/2018 03:34 AM    POTASSIUM 4.6 07/01/2018 03:34 AM    CHLORIDE 106 07/01/2018 03:34 AM    CO2 25 07/01/2018 03:34 AM    GLUCOSE 109 (H) 07/01/2018 03:34 AM    BUN 16 07/01/2018 03:34 AM    CREATININE 0.88 07/01/2018 03:34 AM      Lab Results   Component Value Date/Time    WBC 10.0  06/30/2018 03:59 AM    RBC 4.51 06/30/2018 03:59 AM    HEMOGLOBIN 13.9 06/30/2018 03:59 AM    HEMATOCRIT 43.6 06/30/2018 03:59 AM    MCV 96.7 06/30/2018 03:59 AM    MCH 30.8 06/30/2018 03:59 AM    MCHC 31.9 (L) 06/30/2018 03:59 AM    MPV 10.2 06/30/2018 03:59 AM    NEUTSPOLYS 51.20 06/30/2018 03:59 AM    LYMPHOCYTES 35.50 06/30/2018 03:59 AM    MONOCYTES 9.10 06/30/2018 03:59 AM    EOSINOPHILS 3.30 06/30/2018 03:59 AM    BASOPHILS 0.60 06/30/2018 03:59 AM      PT/INR:   Lab Results   Component Value Date/Time    PROTHROMBTM 29.8 (H) 07/01/2018 03:34 AM    INR 2.87 (H) 07/01/2018 03:34 AM   ]      Assessment:     1. Persistent atrial fibrillation (HCC)  EKG    COMP METABOLIC PANEL    BTYPE NATRIURETIC PEPTIDE   2. Atrial fibrillation status post cardioversion (HCC)  EKG   3. Atrial flutter, unspecified type (HCC)     4. Pacemaker     5. Coronary artery disease involving native coronary artery of native heart without angina pectoris         Medical Decision Making:  Today's Assessment / Status / Plan:   1. Atrial arrhythmias:  - Likely AFL/Atach.  Rhythm has been persistent.  She was recently admitted to the hospital with RVR attempts at cardioversion and pace termination unsuccessful and subsequently discharged with elevated heart rates on increased dose of Atenolol.    - Will increase Atenolol to 75 mg PO BID at this time.  She will continue to monitor her heart rates.  If still elevated in a few days can consider increasing to 100 mg PO BID as long as her blood pressure is ok.   - Will get her scheduled for AFL/AT ablation (+) AV izzy ablation in the next 2-4 weeks with Dr. Matthews.   - Repeat CMP and BNP prior to appt next week.   - Continue coumadin, lasix, aldactone.      2. PPM:  - Left chest wound site is clear.  - No testing today secondary to elevated heart rates.  - Will be back next week for her one week check.   - Advised to keep dressing on and dry and monitor of swelling ,redness, fever.      3. CAD:  -  No chest pain or angina. Stable.     4. Anticoagualtion:  - Followed by [facility] coumadin clinic.  Denies any signs of internal bleeding.    Discussed use of EMS if needed and she verbalizes understanding.   Plan reviewed in detail with the patient and she verbalizes understanding and is in agreement.   RTC in 1 week for follow up, sooner if needed.  Collaborating MD: Case reviewed with Dr. Matthews via phone.     VICTORIANO Winslow M.D.  [address] [place] [id number]  VIA In Basket

## 2018-07-05 ENCOUNTER — TELEPHONE (OUTPATIENT)
Dept: CARDIOLOGY | Facility: MEDICAL CENTER | Age: 78
End: 2018-07-05

## 2018-07-05 LAB — EKG IMPRESSION: NORMAL

## 2018-07-05 NOTE — TELEPHONE ENCOUNTER
Donte Lees A.P.RLeslyeNLeslye 16 minutes ago (9:20 AM)           Readings since I started taking 75mg of Atenolol twice a day.   Date                  Time                 B/P             HR               Oxygen               HR                                     Notes   07/03                 5:00 P.M.                                                   97                         111-131                           Dizzy   07/04                  5:00 A.M.        142/81      81                 96                           82-84                              Dizzy                                 9:40 P.M.                                                   97                          133-134   07/05                  4:00  A.M.         98/61       130               97                          131-132   For Ruchi Kent      Pt submitting BP/HR info after increase to atenolol 75 mg after 7/3 visit.     To Anabel FERGUSON to advise.

## 2018-07-06 NOTE — TELEPHONE ENCOUNTER
Donte WilloughbyspVICTORIANO Chapman 46 minutes ago (1:03 PM)         This is for Dr. Lees nurse,   Update on vitals.   Date            Time           B/P            HR                  Oxygen          HR       07/06           5:00 A.M.   112/67     81                   97                     85 solid                        11:30 A.M.   115/64    86                   97                   86-87   Maybe the 75mg of atenolol 2 x a day is working.   Have a good day.   Donte        7/9/18 update to Anabel.

## 2018-07-09 NOTE — TELEPHONE ENCOUNTER
TIMUR Winslow.   You 1 hour ago (7:54 AM)      Heart rates look improved on last update.  How have they been over the weekend?  How has she been feeling?   Thanks,   COLIN (Routing comment)

## 2018-07-10 ENCOUNTER — TELEPHONE (OUTPATIENT)
Dept: CARDIOLOGY | Facility: MEDICAL CENTER | Age: 78
End: 2018-07-10

## 2018-07-10 ENCOUNTER — ANTICOAGULATION MONITORING (OUTPATIENT)
Dept: VASCULAR LAB | Facility: MEDICAL CENTER | Age: 78
End: 2018-07-10

## 2018-07-10 DIAGNOSIS — Z79.01 CHRONIC ANTICOAGULATION: ICD-10-CM

## 2018-07-10 LAB — INR PPP: 2.6 (ref 2–3.5)

## 2018-07-10 NOTE — TELEPHONE ENCOUNTER
RE: Non-Urgent Medical Question   Received: Today   Message Contents   Donte Deanne Ryan R.N.   Phone Number: [phone]             FYDAVID   We suspect some of th BP info may not be good. We have changed from a wrist machine to a cuff. All readings from now will be using the cuff.   Sorry if we may(?) have miss led you. The HR info is correct.   Thanks   Dotne

## 2018-07-10 NOTE — TELEPHONE ENCOUNTER
Sounds good.  Arm cuff is more accurate than wrist cuff. Please send a BP and heart rate update tomorrow.    Thank you.

## 2018-07-10 NOTE — PROGRESS NOTES
OP Anticoagulation Telephone Note    Date: 7/10/2018  Anticoagulation Summary  As of 7/10/2018    INR goal:   2.0-3.0   TTR:   70.2 % (3 y)   Today's INR:   2.6   Warfarin maintenance plan:   3.75 mg (2.5 mg x 1.5) on Mon; 2.5 mg (2.5 mg x 1) all other days   Weekly warfarin total:   18.75 mg   No change documented:   Roxana Mercedes, Med Ass't   Plan last modified:   Ivone Barraza, Tim (5/8/2018)   Next INR check:   7/24/2018   Target end date:   Indefinite    Indications    Atrial fibrillation (HCC) (Resolved) [I48.91]  Chronic anticoagulation [Z79.01]             Anticoagulation Episode Summary     INR check location:   Home Draw    Preferred lab:       Send INR reminders to:       Comments:   Winston YEAGER      Anticoagulation Care Providers     Provider Role Specialty Phone number    Lizzy Haywood M.D. Referring Cardiology [phone]    [facility] Responsible  [phone]    Vladimir Taylor, PharmD Responsible          Anticoagulation Patient Findings  Patient Findings     Negatives:   Signs/symptoms of thrombosis, Signs/symptoms of bleeding, Laboratory test error suspected, Change in health, Change in alcohol use, Change in activity, Upcoming invasive procedure, Emergency department visit, Upcoming dental procedure, Missed doses, Extra doses, Change in medications, Change in diet/appetite, Hospital admission, Bruising, Other complaints      Plan:  Spoke with patient on the phone. Patient is therapeutic today. Patient denies any changes in medications or diet. Patient denies any signs or symptoms of bleeding or clotting. Instructed patient to call clinic if any unusual bleeding or bruising occurs. Will continue dosing as outlined above. Will follow-up with patient in 2 weeks.    Roxana Mercedes, Medical Assistant    I have reviewed and concur with the above plan     Fran Leyva, PeterD

## 2018-07-10 NOTE — TELEPHONE ENCOUNTER
To EA     Here they  are:   Date          Time           BP           HR                                 Oxygen                   HR                                  Notes   07/07         3:00 A.M.   125/70   81                                  98                             85   07/08         3:25 A.M.   113/78   107                                98                         104-114                        3: 00 P.M.    87/67    121                                96                          125-126                            12:20 P.M.                                                          98                                  Date                        Time                                     BP              HR                                      Oxygen                          HR              Notes   07/08                       6:00 P.M.                                                                                               96                                                                        9:00 P.M.                                                                                              96                         see page 3                          pg 3   Date                Time             BP        HR                 Oxygen         HR          Notes   07/09               noon            90/57   100               98                                                    3 P.M.         87/67    121              96                  125-126        After shower     Sorry, I didn't check until late today.                Update:   Date      Time    BP             HR      Oxy       HR   7/9          6P        100/66     121     97          123-124                   1030P  126/67     121     96          126   7/10        4A         123/72      122    96          126-127     That's all for now.   Donte Magaña   You 35 minutes ago (9:33 AM)         No pain   Tired and taking a  lot of naps.          You   Donte Deanne Magaña 56 minutes ago (9:11 AM)         How are you feeling?

## 2018-07-11 ENCOUNTER — HOSPITAL ENCOUNTER (OUTPATIENT)
Dept: LAB | Facility: MEDICAL CENTER | Age: 78
End: 2018-07-11
Attending: NURSE PRACTITIONER
Payer: MEDICARE

## 2018-07-11 DIAGNOSIS — I48.19 PERSISTENT ATRIAL FIBRILLATION (HCC): ICD-10-CM

## 2018-07-11 LAB
ALBUMIN SERPL BCP-MCNC: 4 G/DL (ref 3.2–4.9)
ALBUMIN/GLOB SERPL: 1.5 G/DL
ALP SERPL-CCNC: 72 U/L (ref 30–99)
ALT SERPL-CCNC: 8 U/L (ref 2–50)
ANION GAP SERPL CALC-SCNC: 12 MMOL/L (ref 0–11.9)
AST SERPL-CCNC: 17 U/L (ref 12–45)
BILIRUB SERPL-MCNC: 0.4 MG/DL (ref 0.1–1.5)
BNP SERPL-MCNC: 84 PG/ML (ref 0–100)
BUN SERPL-MCNC: 28 MG/DL (ref 8–22)
CALCIUM SERPL-MCNC: 9.3 MG/DL (ref 8.5–10.5)
CHLORIDE SERPL-SCNC: 101 MMOL/L (ref 96–112)
CO2 SERPL-SCNC: 24 MMOL/L (ref 20–33)
CREAT SERPL-MCNC: 1.05 MG/DL (ref 0.5–1.4)
GLOBULIN SER CALC-MCNC: 2.7 G/DL (ref 1.9–3.5)
GLUCOSE SERPL-MCNC: 118 MG/DL (ref 65–99)
POTASSIUM SERPL-SCNC: 4.6 MMOL/L (ref 3.6–5.5)
PROT SERPL-MCNC: 6.7 G/DL (ref 6–8.2)
SODIUM SERPL-SCNC: 137 MMOL/L (ref 135–145)

## 2018-07-11 PROCEDURE — 83880 ASSAY OF NATRIURETIC PEPTIDE: CPT

## 2018-07-11 PROCEDURE — 80053 COMPREHEN METABOLIC PANEL: CPT

## 2018-07-11 PROCEDURE — 36415 COLL VENOUS BLD VENIPUNCTURE: CPT

## 2018-07-12 ENCOUNTER — TELEPHONE (OUTPATIENT)
Dept: CARDIOLOGY | Facility: MEDICAL CENTER | Age: 78
End: 2018-07-12

## 2018-07-12 ENCOUNTER — NON-PROVIDER VISIT (OUTPATIENT)
Dept: CARDIOLOGY | Facility: MEDICAL CENTER | Age: 78
End: 2018-07-12
Payer: MEDICARE

## 2018-07-12 ENCOUNTER — OFFICE VISIT (OUTPATIENT)
Dept: CARDIOLOGY | Facility: MEDICAL CENTER | Age: 78
End: 2018-07-12
Payer: MEDICARE

## 2018-07-12 VITALS
SYSTOLIC BLOOD PRESSURE: 100 MMHG | RESPIRATION RATE: 12 BRPM | OXYGEN SATURATION: 93 % | DIASTOLIC BLOOD PRESSURE: 66 MMHG | HEIGHT: 64 IN | HEART RATE: 122 BPM | WEIGHT: 177 LBS | BODY MASS INDEX: 30.22 KG/M2

## 2018-07-12 DIAGNOSIS — R60.9 EDEMA, UNSPECIFIED TYPE: ICD-10-CM

## 2018-07-12 DIAGNOSIS — Z79.01 CHRONIC ANTICOAGULATION: ICD-10-CM

## 2018-07-12 DIAGNOSIS — Z95.0 PACEMAKER: ICD-10-CM

## 2018-07-12 DIAGNOSIS — I48.0 PAROXYSMAL ATRIAL FIBRILLATION (HCC): ICD-10-CM

## 2018-07-12 PROCEDURE — 93000 ELECTROCARDIOGRAM COMPLETE: CPT | Performed by: NURSE PRACTITIONER

## 2018-07-12 PROCEDURE — 99214 OFFICE O/P EST MOD 30 MIN: CPT | Performed by: NURSE PRACTITIONER

## 2018-07-12 ASSESSMENT — ENCOUNTER SYMPTOMS
SENSORY CHANGE: 0
FOCAL WEAKNESS: 0
SORE THROAT: 0
BLURRED VISION: 0
DIARRHEA: 0
HEADACHES: 0
VOMITING: 0
DIZZINESS: 0
SPEECH CHANGE: 0
STRIDOR: 0
HEMOPTYSIS: 0
WEIGHT LOSS: 0
TREMORS: 0
FEVER: 0
COUGH: 0
TINGLING: 0
ORTHOPNEA: 0
SPUTUM PRODUCTION: 0
PND: 0
BLOOD IN STOOL: 0
WHEEZING: 0
DOUBLE VISION: 0
LOSS OF CONSCIOUSNESS: 0
SHORTNESS OF BREATH: 0
CHILLS: 0
ABDOMINAL PAIN: 0
PALPITATIONS: 0
HEARTBURN: 0
NAUSEA: 0

## 2018-07-12 NOTE — TELEPHONE ENCOUNTER
Donte Lees A.P.R.NLeslye 2 hours ago (10:14 AM)         Updates   D 7/10                   7/10                7/10                    7/11               7/11                   7/11   T noon                  1245                1800                   3am               1600                  2000   BP 97/53              125/82             104/71             123/86            136/88             118/65    hr 128                     126                   128                    128                   130                  81   oxy    97                     97                     97                       97                      98                  98    hr     128                  127                   128                     130                  130                  81      To Anabel MURRAY--Pt has appt today 7/12/18

## 2018-07-12 NOTE — LETTER
"     [facility]   [address]  [place] [address]  Phone: [phone]  Fax: [phone]              Donte Magaña  1940    Encounter Date: 7/12/2018    VICTORIANO Winslow          PROGRESS NOTE:  Cardiology/Electrophysiology Follow-up Note      Subjective:   Chief Complaint:   Chief Complaint   Patient presents with   • Atrial Fibrillation       Donte Magaña is a 77 y.o. female who presents today for follow up PPM and atrial arrhythmias.     She had a PPM ([place], dual chamber) placed during recent admission and her beta blockers were increased.  During her admission she was cardioverted and attempted pace termination of her AT with prompt recurrence of her arrhythmia.       She is followed by Dr. Matthews and Dr. Lizzy Haywood.  Past medical history significant for CAD, Hyperlipidemia, chronic anticoagulation use, hypothyroidism.      Today in follow up she states that she is feeling improved from last week.  No longer dizzy.  No presyncope or syncope.    She denies chest pain, palpitations, dyspnea, PND, or orthopnea.  She has lower extremity edema in which she is taking lasix and aldactone for.  Her lower extremity edema is improved today.      She has been monitoring her blood pressure and heart rate diligently.  Her heart rates are still elevated, but some improved control with the increase of Atenolol.  Her blood pressure has been up and down, she recently discovered that she has been using a wrist cuff and switched to a arm cuff and states her measurements have been more consistent since then. Most SBPS being 110-120 rage with that.     Patient endorses medication compliance.  Diarrhea stopped with the stoppage of Tikosyn last month.      Past Medical History:   Diagnosis Date   • A-fib (HCC)    • Anesthesia     \"Tachycardia for 5 days after cataract surgery\"   • Anticoagulant long-term use 1/12/2012   • Arthritis     Knees, hips   • " Asthma     with pneumonia, nothing for 3 years   • Atrial fibrillation (HCC)    • Backpain    • Breath shortness     with exertion O2  2l/m PRN, hasnt used for 3 years   • Bronchitis Nov, 2013   • CAD (coronary artery disease)    • Depression    • Glaucoma 5/3/2011   • Hematoma complicating a procedure 11/3/2012   • High cholesterol    • Hypertension    • Hypothyroid    • Lupus    • Macular degeneration    • Menopause 1/12/2012   • Mitral regurgitation 10/30/2012   • Obesity 1/12/2012   • Pneumonia feb,2013   • Pre-syncope 6/29/2018   • Pulmonary hypertension (HCC) 10/30/2012   • PVC's (premature ventricular contractions) 1/12/2012   • Senile nuclear sclerosis    • Spinal stenosis of lumbar region at multiple levels    • Unspecified cataract     repaired bilateral   • Unspecified urinary incontinence      Past Surgical History:   Procedure Laterality Date   • KNEE ARTHROPLASTY TOTAL Right 6/23/2016    Procedure: KNEE ARTHROPLASTY TOTAL;  Surgeon: Heriberto Lozada M.D.;  Location: [facility];  Service:    • KNEE ARTHROPLASTY TOTAL Left 5/28/2015    Procedure: KNEE ARTHROPLASTY TOTAL;  Surgeon: Heriberto Lozada M.D.;  Location: [facility];  Service:    • CATARACT PHACO WITH IOL Right 5/5/2015    Procedure: IOL OD - STANDARD;  Surgeon: Dmitry Bejarano M.D.;  Location: [facility];  Service:    • CATARACT PHACO WITH IOL  4/21/2015    Performed by Dmitry Bejarano M.D. at [facility]   • RECOVERY  11/30/2010    Performed by SURGERY, CATH-RECOVERY at SURGERY SAME DAY [facility] ORS   • COLONOSCOPY  2008    Normal    [facility]   • ABDOMINAL HYSTERECTOMY TOTAL  April 15,1975    still has ovaries   • OPEN REDUCTION      left ankle   • OTHER      Removed pins from left ankle   • TONSILLECTOMY AND ADENOIDECTOMY       Family History   Problem Relation Age of Onset   • Stroke Mother    • Diabetes Father    • Stroke Sister    • Heart Disease Brother    • Stroke Sister    •  "GI Daughter      Crohn's Disease   • Heart Disease Daughter      CHF   • Other Daughter      Chronic Pain--Lymphedema   • Cancer Paternal Aunt      Social History     Social History   • Marital status:      Spouse name: N/A   • Number of children: N/A   • Years of education: N/A     Occupational History   • Not on file.     Social History Main Topics   • Smoking status: Never Smoker   • Smokeless tobacco: Never Used   • Alcohol use No   • Drug use: No   • Sexual activity: Not Currently     Partners: Male     Birth control/ protection: Post-Menopausal     Other Topics Concern   • Not on file     Social History Narrative   • No narrative on file     Allergies   Allergen Reactions   • Amiodarone Hives     Throat and tongue itching   • Bactrim Shortness of Breath   • Claritin-D [Loratadine-Pseudoephedrine] Palpitations   • Clotrimazole      Stinging / burning on chest   • Metoprolol      Causes throat swelling   • Morphine      Hallucinations   • Qvar [Beclomethasone Dipropionate]      Pressure on heart     • Vibramycin Shortness of Breath   • Atorvastatin Calcium-Polysorbate 80      Muscle aches     • Augmentin      Unknown reaction   • Cipro Xr Swelling   • Diltiazem      rash   • Flecainide      dizziness   • Keflex Unspecified     Pt states \"Unsure\".   • Mucinex      GI Distress     • Tramadol      crying   • Phytoplex Z-Guard [Petrolatum-Zinc Oxide]      \"causes burning\"   • Atorvastatin Myalgia   • Tape Rash     Paper tape okay       Current Outpatient Prescriptions   Medication Sig Dispense Refill   • atenolol (TENORMIN) 50 MG Tab Take 1.5 Tabs by mouth 2 times a day. 180 Tab 1   • spironolactone (ALDACTONE) 50 MG Tab Take 25 mg by mouth every day.     • furosemide (LASIX) 40 MG Tab Take 2 Tabs by mouth every day. 80 mg in the AM 60 Tab 3   • levothyroxine (SYNTHROID) 50 MCG Tab TAKE 1 TABLET BY MOUTH EVERY MORNING ON AN EMPTY STOMACH. 90 Tab 3   • raNITidine (ZANTAC) 150 MG Tab TAKE ONE TABLET BY MOUTH " "TWICE DAILY 180 Tab 3   • lisinopril (PRINIVIL) 5 MG Tab TAKE ONE TABLET BY MOUTH EVERY DAY 90 Tab 3   • estradiol (ESTRACE) 2 MG Tab TAKE ONE TABLET BY MOUTH EVERY DAY 90 Tab 3   • magnesium oxide (MAG-OX) 400 (241.3 Mg) MG Tab tablet Take 1 Tab by mouth every day. 30 Tab 6   • warfarin (COUMADIN) 2.5 MG Tab Take 2.5-3.75 mg by mouth every day. 3.75 mg on Monday and Wednesday  2.5 mg all other days     • sertraline (ZOLOFT) 25 MG tablet Take 1 Tab by mouth every day. 90 Tab 3   • digoxin (LANOXIN) 125 MCG Tab Take 1 Tab by mouth every day. 90 Tab 3   • Multiple Vitamins-Minerals (ICAPS AREDS 2) Cap Take 2 Caps by mouth 2 Times a Day. 100 Cap 3   • acetaminophen (TYLENOL) 500 MG TABS Take 1,000 mg by mouth 3 times a day. Indications: Pain     • vitamin D (CHOLECALCIFEROL) 1000 UNIT TABS Take 2,000 Units by mouth every day.     • magnesium oxide (MAG-OX) 400 MG Tab        No current facility-administered medications for this visit.        Review of Systems   Constitutional: Negative for chills, fever, malaise/fatigue and weight loss.   HENT: Negative for congestion, nosebleeds, sore throat and tinnitus.    Eyes: Negative for blurred vision and double vision.   Respiratory: Negative for cough, hemoptysis, sputum production, shortness of breath, wheezing and stridor.    Cardiovascular: Negative for chest pain, palpitations, orthopnea, leg swelling and PND.   Gastrointestinal: Negative for abdominal pain, blood in stool, diarrhea, heartburn, melena, nausea and vomiting.   Genitourinary: Negative for hematuria.   Skin: Positive for itching. Negative for rash.   Neurological: Negative for dizziness, tingling, tremors, sensory change, speech change, focal weakness, loss of consciousness and headaches.     All others systems reviewed and negative.     Objective:     Blood pressure 100/66, pulse (!) 122, resp. rate 12, height 1.626 m (5' 4\"), weight 80.3 kg (177 lb), last menstrual period 01/01/1993, SpO2 93 %. Body mass " index is 30.38 kg/m².    Physical Exam   Constitutional: She is oriented to person, place, and time and well-developed, well-nourished, and in no distress.   HENT:   Head: Normocephalic and atraumatic.   Eyes: Conjunctivae and EOM are normal. Pupils are equal, round, and reactive to light.   Neck: Normal range of motion. Neck supple. No JVD present.   Cardiovascular: Normal heart sounds and intact distal pulses.  An irregularly irregular rhythm present. Tachycardia present.  Exam reveals no gallop and no friction rub.    No murmur heard.  Pulmonary/Chest: Effort normal and breath sounds normal. No respiratory distress. She has no wheezes. She has no rales. She exhibits no tenderness.   Abdominal: Soft. Bowel sounds are normal. There is no tenderness.   Musculoskeletal: Normal range of motion. She exhibits edema (1+ bilat pedal and ankle- improved from last assessment).   Neurological: She is alert and oriented to person, place, and time.   Skin: Skin is warm and dry. No rash noted. No erythema.   Psychiatric: Mood, memory, affect and judgment normal.       Cardiac Imaging and Procedures Review:    EKG dated 18:   Afib/AFL, ventricular rate 120s.     Device Interrogation dated 18  RA: Sensing: 3.0 mV, Threshold: (N/A), Impedance: 767 ohms.  RV: Sensin.1 mV, Threshold: (n/A), Impedance: 800 ohms.    Labs (personally reviewed and notable for):   Lab Results   Component Value Date/Time    SODIUM 137 2018 08:59 AM    POTASSIUM 4.6 2018 08:59 AM    CHLORIDE 101 2018 08:59 AM    CO2 24 2018 08:59 AM    GLUCOSE 118 (H) 2018 08:59 AM    BUN 28 (H) 2018 08:59 AM    CREATININE 1.05 2018 08:59 AM       PT/INR:   Lab Results   Component Value Date/Time    PROTHROMBTM 29.8 (H) 2018 03:34 AM    INR 2.6 07/10/2018       Assessment:     1. Paroxysmal atrial fibrillation (HCC)  EKG   2. Pacemaker     3. Chronic anticoagulation         Medical Decision Making:  Today's  Assessment / Status / Plan:   1. Afib:  - Scheduled for ablation with Dr. Matthews next week.    - Her atrial arrhythmias persisit at this time and though her rates have improved some with Atenolol some she continues to have periods of RVR.  She currently has no complaints of dizziness or presyncope.   - We discussed medications.  Not many options left to try or titrate at this point.  She can potentially attempt to take another 25 mg Atenolol at night time only if her SBP is >110.    - Her renal function is borderline for increasing her digoxin so will leave at it's current dose.     2. PPM:  - BSI dual chamber device.  - Threshold testing not completed today secondary to elevated heart rate.  Impedances and sensing is stable.  - Dressing removed from L upper chest.  Wound site is clear.  Discussed ok to shower and how to care for site.  Continue to monitor for erythema, edema, discolored drainage, fever.     3. Edema:  - Looks improved today.  BNP was normal.  No dyspnea PND, orthopnea.   - Continue lasix and aldactone at current doses.     4. Anticoagulation:  - No signs of internal bleeding.  [facility].       Reviewed use of emergency system if needed.          Plan reviewed in detail with the patient and she verbalizes understanding and is in agreement.   RTC post procedurally, sooner if clinical condition changes  Collaborating MD/ADD: VICTORIANO Gorman M.D.  [address] [place] [address]  VIA In Basket

## 2018-07-13 ENCOUNTER — TELEPHONE (OUTPATIENT)
Dept: CARDIOLOGY | Facility: MEDICAL CENTER | Age: 78
End: 2018-07-13

## 2018-07-13 LAB — EKG IMPRESSION: NORMAL

## 2018-07-13 NOTE — TELEPHONE ENCOUNTER
"Bettye Narayan, Med Ass't  Lissy Wolfe R.N.   Phone Number: [phone]          Previous Messages         Non-Urgent Medical Question     You   BERE WinslowP.R.NLeslye 26 minutes ago (4:24 PM)     See below please. (Routing comment)       Bettye Narayan, Med Ass't routed conversation to You 38 minutes ago (4:12 PM)      BERE GodoyPLeslyeRLeslyeNLeslye 1 hour ago (3:45 PM)         Here are updates:   D    712            712               713                 713   T     4am          8pm              7am               1pm   BP  133/81     120/75          127/68          115/70   HR    87             105                 83                   130   Oxy   97             95                   95                    96     HR     85-95      113-142         83                  130         Spoke with patient who denies symptoms of elevated HR @ 130 at 1:00 PM, she states \"don't worry about it\".  Recheck at 4:45 PM /77 , denies distress/symptoms.  It is nearly time for her second Atenolol dose today and per chart note she can take an extra Atenolol 25mg dose if BP > 110 systolic.  She verbalizes understanding and agrees to activate EMS if necessary for symptomatic AT.  To EA APN.    "

## 2018-07-13 NOTE — PROGRESS NOTES
"Cardiology/Electrophysiology Follow-up Note      Subjective:   Chief Complaint:   Chief Complaint   Patient presents with   • Atrial Fibrillation       Donte Magaña is a 77 y.o. female who presents today for follow up PPM and atrial arrhythmias.     She had a PPM ([place], dual chamber) placed during recent admission and her beta blockers were increased.  During her admission she was cardioverted and attempted pace termination of her AT with prompt recurrence of her arrhythmia.       She is followed by Dr. Matthews and Dr. Lizzy Haywood.  Past medical history significant for CAD, Hyperlipidemia, chronic anticoagulation use, hypothyroidism.      Today in follow up she states that she is feeling improved from last week.  No longer dizzy.  No presyncope or syncope.    She denies chest pain, palpitations, dyspnea, PND, or orthopnea.  She has lower extremity edema in which she is taking lasix and aldactone for.  Her lower extremity edema is improved today.      She has been monitoring her blood pressure and heart rate diligently.  Her heart rates are still elevated, but some improved control with the increase of Atenolol.  Her blood pressure has been up and down, she recently discovered that she has been using a wrist cuff and switched to a arm cuff and states her measurements have been more consistent since then. Most SBPS being 110-120 rage with that.     Patient endorses medication compliance.  Diarrhea stopped with the stoppage of Tikosyn last month.      Past Medical History:   Diagnosis Date   • A-fib (HCC)    • Anesthesia     \"Tachycardia for 5 days after cataract surgery\"   • Anticoagulant long-term use 1/12/2012   • Arthritis     Knees, hips   • Asthma     with pneumonia, nothing for 3 years   • Atrial fibrillation (HCC)    • Backpain    • Breath shortness     with exertion O2  2l/m PRN, hasnt used for 3 years   • Bronchitis Nov, 2013   • CAD (coronary artery disease)    • Depression    • Glaucoma 5/3/2011 "   • Hematoma complicating a procedure 11/3/2012   • High cholesterol    • Hypertension    • Hypothyroid    • Lupus    • Macular degeneration    • Menopause 1/12/2012   • Mitral regurgitation 10/30/2012   • Obesity 1/12/2012   • Pneumonia feb,2013   • Pre-syncope 6/29/2018   • Pulmonary hypertension (HCC) 10/30/2012   • PVC's (premature ventricular contractions) 1/12/2012   • Senile nuclear sclerosis    • Spinal stenosis of lumbar region at multiple levels    • Unspecified cataract     repaired bilateral   • Unspecified urinary incontinence      Past Surgical History:   Procedure Laterality Date   • KNEE ARTHROPLASTY TOTAL Right 6/23/2016    Procedure: KNEE ARTHROPLASTY TOTAL;  Surgeon: Heriberto Lozada M.D.;  Location: SURGERY [facility];  Service:    • KNEE ARTHROPLASTY TOTAL Left 5/28/2015    Procedure: KNEE ARTHROPLASTY TOTAL;  Surgeon: Heriberto Lozada M.D.;  Location: SURGERY [facility];  Service:    • CATARACT PHACO WITH IOL Right 5/5/2015    Procedure: IOL OD - STANDARD;  Surgeon: Dmitry Bejarano M.D.;  Location: SURGERY [facility];  Service:    • CATARACT PHACO WITH IOL  4/21/2015    Performed by Dmitry Be[facility]ano M.D. at [facility]   • RECOVERY  11/30/2010    Performed by SURGERY, CATH-RECOVERY at SURGERY SAME DAY [facility]   • COLONOSCOPY  2008    Normal    [facility]   • ABDOMINAL HYSTERECTOMY TOTAL  April 15,1975    still has ovaries   • OPEN REDUCTION      left ankle   • OTHER      Removed pins from left ankle   • TONSILLECTOMY AND ADENOIDECTOMY       Family History   Problem Relation Age of Onset   • Stroke Mother    • Diabetes Father    • Stroke Sister    • Heart Disease Brother    • Stroke Sister    • GI Daughter      Crohn's Disease   • Heart Disease Daughter      CHF   • Other Daughter      Chronic Pain--Lymphedema   • Cancer Paternal Aunt      Social History     Social History   • Marital status:      Spouse name: N/A   • Number of children: N/A   •  "Years of education: N/A     Occupational History   • Not on file.     Social History Main Topics   • Smoking status: Never Smoker   • Smokeless tobacco: Never Used   • Alcohol use No   • Drug use: No   • Sexual activity: Not Currently     Partners: Male     Birth control/ protection: Post-Menopausal     Other Topics Concern   • Not on file     Social History Narrative   • No narrative on file     Allergies   Allergen Reactions   • Amiodarone Hives     Throat and tongue itching   • Bactrim Shortness of Breath   • Claritin-D [Loratadine-Pseudoephedrine] Palpitations   • Clotrimazole      Stinging / burning on chest   • Metoprolol      Causes throat swelling   • Morphine      Hallucinations   • Qvar [Beclomethasone Dipropionate]      Pressure on heart     • Vibramycin Shortness of Breath   • Atorvastatin Calcium-Polysorbate 80      Muscle aches     • Augmentin      Unknown reaction   • Cipro Xr Swelling   • Diltiazem      rash   • Flecainide      dizziness   • Keflex Unspecified     Pt states \"Unsure\".   • Mucinex      GI Distress     • Tramadol      crying   • Phytoplex Z-Guard [Petrolatum-Zinc Oxide]      \"causes burning\"   • Atorvastatin Myalgia   • Tape Rash     Paper tape okay       Current Outpatient Prescriptions   Medication Sig Dispense Refill   • atenolol (TENORMIN) 50 MG Tab Take 1.5 Tabs by mouth 2 times a day. 180 Tab 1   • spironolactone (ALDACTONE) 50 MG Tab Take 25 mg by mouth every day.     • furosemide (LASIX) 40 MG Tab Take 2 Tabs by mouth every day. 80 mg in the AM 60 Tab 3   • levothyroxine (SYNTHROID) 50 MCG Tab TAKE 1 TABLET BY MOUTH EVERY MORNING ON AN EMPTY STOMACH. 90 Tab 3   • raNITidine (ZANTAC) 150 MG Tab TAKE ONE TABLET BY MOUTH TWICE DAILY 180 Tab 3   • lisinopril (PRINIVIL) 5 MG Tab TAKE ONE TABLET BY MOUTH EVERY DAY 90 Tab 3   • estradiol (ESTRACE) 2 MG Tab TAKE ONE TABLET BY MOUTH EVERY DAY 90 Tab 3   • magnesium oxide (MAG-OX) 400 (241.3 Mg) MG Tab tablet Take 1 Tab by mouth every " "day. 30 Tab 6   • warfarin (COUMADIN) 2.5 MG Tab Take 2.5-3.75 mg by mouth every day. 3.75 mg on Monday and Wednesday  2.5 mg all other days     • sertraline (ZOLOFT) 25 MG tablet Take 1 Tab by mouth every day. 90 Tab 3   • digoxin (LANOXIN) 125 MCG Tab Take 1 Tab by mouth every day. 90 Tab 3   • Multiple Vitamins-Minerals (ICAPS AREDS 2) Cap Take 2 Caps by mouth 2 Times a Day. 100 Cap 3   • acetaminophen (TYLENOL) 500 MG TABS Take 1,000 mg by mouth 3 times a day. Indications: Pain     • vitamin D (CHOLECALCIFEROL) 1000 UNIT TABS Take 2,000 Units by mouth every day.     • magnesium oxide (MAG-OX) 400 MG Tab        No current facility-administered medications for this visit.        Review of Systems   Constitutional: Negative for chills, fever, malaise/fatigue and weight loss.   HENT: Negative for congestion, nosebleeds, sore throat and tinnitus.    Eyes: Negative for blurred vision and double vision.   Respiratory: Negative for cough, hemoptysis, sputum production, shortness of breath, wheezing and stridor.    Cardiovascular: Negative for chest pain, palpitations, orthopnea, leg swelling and PND.   Gastrointestinal: Negative for abdominal pain, blood in stool, diarrhea, heartburn, melena, nausea and vomiting.   Genitourinary: Negative for hematuria.   Skin: Positive for itching. Negative for rash.   Neurological: Negative for dizziness, tingling, tremors, sensory change, speech change, focal weakness, loss of consciousness and headaches.     All others systems reviewed and negative.     Objective:     Blood pressure 100/66, pulse (!) 122, resp. rate 12, height 1.626 m (5' 4\"), weight 80.3 kg (177 lb), last menstrual period 01/01/1993, SpO2 93 %. Body mass index is 30.38 kg/m².    Physical Exam   Constitutional: She is oriented to person, place, and time and well-developed, well-nourished, and in no distress.   HENT:   Head: Normocephalic and atraumatic.   Eyes: Conjunctivae and EOM are normal. Pupils are equal, " round, and reactive to light.   Neck: Normal range of motion. Neck supple. No JVD present.   Cardiovascular: Normal heart sounds and intact distal pulses.  An irregularly irregular rhythm present. Tachycardia present.  Exam reveals no gallop and no friction rub.    No murmur heard.  Pulmonary/Chest: Effort normal and breath sounds normal. No respiratory distress. She has no wheezes. She has no rales. She exhibits no tenderness.   Abdominal: Soft. Bowel sounds are normal. There is no tenderness.   Musculoskeletal: Normal range of motion. She exhibits edema (1+ bilat pedal and ankle- improved from last assessment).   Neurological: She is alert and oriented to person, place, and time.   Skin: Skin is warm and dry. No rash noted. No erythema.   Psychiatric: Mood, memory, affect and judgment normal.       Cardiac Imaging and Procedures Review:    EKG dated 18:   Afib/AFL, ventricular rate 120s.     Device Interrogation dated 18  RA: Sensing: 3.0 mV, Threshold: (N/A), Impedance: 767 ohms.  RV: Sensin.1 mV, Threshold: (n/A), Impedance: 800 ohms.    Labs (personally reviewed and notable for):   Lab Results   Component Value Date/Time    SODIUM 137 2018 08:59 AM    POTASSIUM 4.6 2018 08:59 AM    CHLORIDE 101 2018 08:59 AM    CO2 24 2018 08:59 AM    GLUCOSE 118 (H) 2018 08:59 AM    BUN 28 (H) 2018 08:59 AM    CREATININE 1.05 2018 08:59 AM       PT/INR:   Lab Results   Component Value Date/Time    PROTHROMBTM 29.8 (H) 2018 03:34 AM    INR 2.6 07/10/2018       Assessment:     1. Paroxysmal atrial fibrillation (HCC)  EKG   2. Pacemaker     3. Chronic anticoagulation         Medical Decision Making:  Today's Assessment / Status / Plan:   1. Afib:  - Scheduled for ablation with Dr. Matthews next week.    - Her atrial arrhythmias persisit at this time and though her rates have improved some with Atenolol some she continues to have periods of RVR.  She currently has no  complaints of dizziness or presyncope.   - We discussed medications.  Not many options left to try or titrate at this point.  She can potentially attempt to take another 25 mg Atenolol at night time only if her SBP is >110.    - Her renal function is borderline for increasing her digoxin so will leave at it's current dose.     2. PPM:  - BSI dual chamber device.  - Threshold testing not completed today secondary to elevated heart rate.  Impedances and sensing is stable.  - Dressing removed from L upper chest.  Wound site is clear.  Discussed ok to shower and how to care for site.  Continue to monitor for erythema, edema, discolored drainage, fever.     3. Edema:  - Looks improved today.  BNP was normal.  No dyspnea PND, orthopnea.   - Continue lasix and aldactone at current doses.     4. Anticoagulation:  - No signs of internal bleeding.  [facility]       Reviewed use of emergency system if needed.          Plan reviewed in detail with the patient and she verbalizes understanding and is in agreement.   RTC post procedurally, sooner if clinical condition changes  Collaborating MD/ADD: TIMUR Gorman.

## 2018-07-16 ENCOUNTER — TELEPHONE (OUTPATIENT)
Dept: CARDIOLOGY | Facility: MEDICAL CENTER | Age: 78
End: 2018-07-16

## 2018-07-16 NOTE — TELEPHONE ENCOUNTER
Patient scheduled for RFA w/ENA on 7-19-18 at [facility] with Dr. Matthews.

## 2018-07-16 NOTE — TELEPHONE ENCOUNTER
The chart doesn't come through well and it's sometimes hard tell what number belongs where.    We had discussed trying to take an additional 25 mg Atenolol at night if heart rates elevated and BP allowing, thus SBP would need to be more than 110 on the top number.  I am not sure how much this will help but more as a tool to see if can get heart rate improved any.

## 2018-07-16 NOTE — TELEPHONE ENCOUNTER
To EA to review from pt email:    Misc. info   I take my 75mg of atenolol at 5 am & 5pm   I take extra 25mg  if top number of BP is over 110 - unless its close to 5 oclock   Maybe the extra atenolol is the wrong pill - my cardiologist told me a long time ago that if my top number   is over 130 - I should take 1 extra Lisinopril  (5mg).  This med. is for BP.     Just something to think about, since the atenolol isn't doing anything.   Have a nice weekend.   [place]   Update   Date         713 714   Time         1640          2130                                                     320            915   BP         108/77      134/91     took 25mg atenolol   120/91      130/71   HR        130              131                                                        128              79   Oxy       97                 97                                                           97                98   Pulse    130              139                                                         117              79   see next page for info         updates   Date       714 714 714                           715            715   Time        915        1615          2050                          210          1025   BP   130/71   127/83      135/84                   138/98        137/82   HR       79          129             129   + 25mg         129              85   Oxy    98            96                 98                              97               98   HR      79            131              129                           130              92

## 2018-07-19 ENCOUNTER — HOSPITAL ENCOUNTER (OUTPATIENT)
Facility: MEDICAL CENTER | Age: 78
End: 2018-07-20
Attending: INTERNAL MEDICINE | Admitting: INTERNAL MEDICINE
Payer: MEDICARE

## 2018-07-19 LAB
ACT BLD: 290 SEC (ref 74–137)
ACT BLD: 290 SEC (ref 74–137)
ACT BLD: 296 SEC (ref 74–137)
ACT BLD: 296 SEC (ref 74–137)
ACT BLD: 307 SEC (ref 74–137)
ALBUMIN SERPL BCP-MCNC: 3.9 G/DL (ref 3.2–4.9)
ALBUMIN/GLOB SERPL: 1.1 G/DL
ALP SERPL-CCNC: 80 U/L (ref 30–99)
ALT SERPL-CCNC: 11 U/L (ref 2–50)
ANION GAP SERPL CALC-SCNC: 11 MMOL/L (ref 0–11.9)
AST SERPL-CCNC: 14 U/L (ref 12–45)
BILIRUB SERPL-MCNC: 0.6 MG/DL (ref 0.1–1.5)
BUN SERPL-MCNC: 17 MG/DL (ref 8–22)
CALCIUM SERPL-MCNC: 9.8 MG/DL (ref 8.5–10.5)
CHLORIDE SERPL-SCNC: 99 MMOL/L (ref 96–112)
CO2 SERPL-SCNC: 26 MMOL/L (ref 20–33)
CREAT SERPL-MCNC: 0.8 MG/DL (ref 0.5–1.4)
EKG IMPRESSION: NORMAL
EKG IMPRESSION: NORMAL
ERYTHROCYTE [DISTWIDTH] IN BLOOD BY AUTOMATED COUNT: 48.8 FL (ref 35.9–50)
GLOBULIN SER CALC-MCNC: 3.6 G/DL (ref 1.9–3.5)
GLUCOSE SERPL-MCNC: 120 MG/DL (ref 65–99)
HCT VFR BLD AUTO: 47.4 % (ref 37–47)
HGB BLD-MCNC: 15.5 G/DL (ref 12–16)
INR PPP: 3.15 (ref 0.87–1.13)
MCH RBC QN AUTO: 31.1 PG (ref 27–33)
MCHC RBC AUTO-ENTMCNC: 32.7 G/DL (ref 33.6–35)
MCV RBC AUTO: 95 FL (ref 81.4–97.8)
PLATELET # BLD AUTO: 261 K/UL (ref 164–446)
PMV BLD AUTO: 10.1 FL (ref 9–12.9)
POTASSIUM SERPL-SCNC: 4.3 MMOL/L (ref 3.6–5.5)
PROT SERPL-MCNC: 7.5 G/DL (ref 6–8.2)
PROTHROMBIN TIME: 32.1 SEC (ref 12–14.6)
RBC # BLD AUTO: 4.99 M/UL (ref 4.2–5.4)
SODIUM SERPL-SCNC: 136 MMOL/L (ref 135–145)
WBC # BLD AUTO: 12.8 K/UL (ref 4.8–10.8)

## 2018-07-19 PROCEDURE — C1732 CATH, EP, DIAG/ABL, 3D/VECT: HCPCS

## 2018-07-19 PROCEDURE — 700105 HCHG RX REV CODE 258: Performed by: INTERNAL MEDICINE

## 2018-07-19 PROCEDURE — 700111 HCHG RX REV CODE 636 W/ 250 OVERRIDE (IP)

## 2018-07-19 PROCEDURE — 700102 HCHG RX REV CODE 250 W/ 637 OVERRIDE(OP): Performed by: INTERNAL MEDICINE

## 2018-07-19 PROCEDURE — C1893 INTRO/SHEATH, FIXED,NON-PEEL: HCPCS

## 2018-07-19 PROCEDURE — 93312 ECHO TRANSESOPHAGEAL: CPT

## 2018-07-19 PROCEDURE — 93662 INTRACARDIAC ECG (ICE): CPT

## 2018-07-19 PROCEDURE — C1894 INTRO/SHEATH, NON-LASER: HCPCS

## 2018-07-19 PROCEDURE — 305545 HCHG DOUBLE TRANSDUCER

## 2018-07-19 PROCEDURE — 93005 ELECTROCARDIOGRAM TRACING: CPT | Performed by: INTERNAL MEDICINE

## 2018-07-19 PROCEDURE — 85027 COMPLETE CBC AUTOMATED: CPT

## 2018-07-19 PROCEDURE — 93655 ICAR CATH ABLTJ DSCRT ARRHYT: CPT

## 2018-07-19 PROCEDURE — 360995 HCHG BAYLIS TRANSSEPTAL NEEDLE

## 2018-07-19 PROCEDURE — 93321 DOPPLER ECHO F-UP/LMTD STD: CPT

## 2018-07-19 PROCEDURE — 85610 PROTHROMBIN TIME: CPT

## 2018-07-19 PROCEDURE — G0378 HOSPITAL OBSERVATION PER HR: HCPCS

## 2018-07-19 PROCEDURE — 93656 COMPRE EP EVAL ABLTJ ATR FIB: CPT

## 2018-07-19 PROCEDURE — A9270 NON-COVERED ITEM OR SERVICE: HCPCS | Performed by: INTERNAL MEDICINE

## 2018-07-19 PROCEDURE — 160002 HCHG RECOVERY MINUTES (STAT)

## 2018-07-19 PROCEDURE — 305383 HCHG CARTO3 REF PATCH

## 2018-07-19 PROCEDURE — 00537 ANES CARDIAC EP PROCEDURES: CPT

## 2018-07-19 PROCEDURE — 700101 HCHG RX REV CODE 250

## 2018-07-19 PROCEDURE — 80053 COMPREHEN METABOLIC PANEL: CPT

## 2018-07-19 PROCEDURE — 93650 ICAR CATH ABLTJ AV NODE FUNC: CPT

## 2018-07-19 PROCEDURE — 36620 INSERTION CATHETER ARTERY: CPT

## 2018-07-19 PROCEDURE — 305387 HCHG SUTURES

## 2018-07-19 PROCEDURE — 93010 ELECTROCARDIOGRAM REPORT: CPT | Performed by: INTERNAL MEDICINE

## 2018-07-19 PROCEDURE — C1759 CATH, INTRA ECHOCARDIOGRAPHY: HCPCS

## 2018-07-19 PROCEDURE — 85347 COAGULATION TIME ACTIVATED: CPT

## 2018-07-19 PROCEDURE — 304952 HCHG R 2 PADS

## 2018-07-19 PROCEDURE — 93325 DOPPLER ECHO COLOR FLOW MAPG: CPT

## 2018-07-19 PROCEDURE — C1730 CATH, EP, 19 OR FEW ELECT: HCPCS

## 2018-07-19 PROCEDURE — C1731 CATH, EP, 20 OR MORE ELEC: HCPCS

## 2018-07-19 PROCEDURE — 93613 INTRACARDIAC EPHYS 3D MAPG: CPT

## 2018-07-19 PROCEDURE — 93657 TX L/R ATRIAL FIB ADDL: CPT

## 2018-07-19 PROCEDURE — 360980 HCHG SINGLE TRANSDUCER

## 2018-07-19 RX ORDER — SPIRONOLACTONE 25 MG/1
25 TABLET ORAL DAILY
Status: DISCONTINUED | OUTPATIENT
Start: 2018-07-19 | End: 2018-07-20 | Stop reason: HOSPADM

## 2018-07-19 RX ORDER — HEPARIN SODIUM,PORCINE 1000/ML
VIAL (ML) INJECTION
Status: COMPLETED
Start: 2018-07-19 | End: 2018-07-19

## 2018-07-19 RX ORDER — ACETAMINOPHEN 500 MG
1000 TABLET ORAL EVERY 6 HOURS PRN
Status: DISCONTINUED | OUTPATIENT
Start: 2018-07-19 | End: 2018-07-20 | Stop reason: HOSPADM

## 2018-07-19 RX ORDER — LISINOPRIL 5 MG/1
5 TABLET ORAL DAILY
Status: DISCONTINUED | OUTPATIENT
Start: 2018-07-19 | End: 2018-07-20 | Stop reason: HOSPADM

## 2018-07-19 RX ORDER — RANITIDINE 150 MG/1
150 TABLET ORAL 2 TIMES DAILY
Status: DISCONTINUED | OUTPATIENT
Start: 2018-07-19 | End: 2018-07-19

## 2018-07-19 RX ORDER — SODIUM CHLORIDE 9 MG/ML
INJECTION, SOLUTION INTRAVENOUS CONTINUOUS
Status: DISCONTINUED | OUTPATIENT
Start: 2018-07-19 | End: 2018-07-20 | Stop reason: HOSPADM

## 2018-07-19 RX ORDER — PROTAMINE SULFATE 10 MG/ML
INJECTION, SOLUTION INTRAVENOUS
Status: DISPENSED
Start: 2018-07-19 | End: 2018-07-20

## 2018-07-19 RX ORDER — OMEPRAZOLE 20 MG/1
20 CAPSULE, DELAYED RELEASE ORAL
Status: DISCONTINUED | OUTPATIENT
Start: 2018-07-19 | End: 2018-07-20 | Stop reason: HOSPADM

## 2018-07-19 RX ORDER — OXYCODONE HYDROCHLORIDE AND ACETAMINOPHEN 5; 325 MG/1; MG/1
1-2 TABLET ORAL EVERY 4 HOURS PRN
Status: DISCONTINUED | OUTPATIENT
Start: 2018-07-19 | End: 2018-07-20 | Stop reason: HOSPADM

## 2018-07-19 RX ORDER — LIDOCAINE HYDROCHLORIDE 10 MG/ML
INJECTION, SOLUTION INFILTRATION; PERINEURAL
Status: COMPLETED
Start: 2018-07-19 | End: 2018-07-19

## 2018-07-19 RX ORDER — FUROSEMIDE 40 MG/1
80 TABLET ORAL DAILY
Status: DISCONTINUED | OUTPATIENT
Start: 2018-07-19 | End: 2018-07-20 | Stop reason: HOSPADM

## 2018-07-19 RX ORDER — LEVOTHYROXINE SODIUM 0.05 MG/1
50 TABLET ORAL EVERY MORNING
Status: DISCONTINUED | OUTPATIENT
Start: 2018-07-20 | End: 2018-07-20 | Stop reason: HOSPADM

## 2018-07-19 RX ORDER — FAMOTIDINE 20 MG/1
20 TABLET, FILM COATED ORAL 2 TIMES DAILY
Status: DISCONTINUED | OUTPATIENT
Start: 2018-07-19 | End: 2018-07-20 | Stop reason: HOSPADM

## 2018-07-19 RX ORDER — ATENOLOL 50 MG/1
50 TABLET ORAL
Status: DISCONTINUED | OUTPATIENT
Start: 2018-07-20 | End: 2018-07-20 | Stop reason: HOSPADM

## 2018-07-19 RX ADMIN — LIDOCAINE HYDROCHLORIDE: 10 INJECTION, SOLUTION INFILTRATION; PERINEURAL at 12:30

## 2018-07-19 RX ADMIN — SODIUM CHLORIDE: 9 INJECTION, SOLUTION INTRAVENOUS at 22:20

## 2018-07-19 RX ADMIN — OMEPRAZOLE 20 MG: 20 CAPSULE, DELAYED RELEASE ORAL at 18:25

## 2018-07-19 RX ADMIN — HEPARIN SODIUM: 1000 INJECTION, SOLUTION INTRAVENOUS; SUBCUTANEOUS at 12:48

## 2018-07-19 RX ADMIN — SERTRALINE 25 MG: 50 TABLET, FILM COATED ORAL at 18:25

## 2018-07-19 RX ADMIN — HEPARIN SODIUM: 1000 INJECTION, SOLUTION INTRAVENOUS; SUBCUTANEOUS at 15:36

## 2018-07-19 RX ADMIN — FUROSEMIDE 80 MG: 40 TABLET ORAL at 18:25

## 2018-07-19 RX ADMIN — SPIRONOLACTONE 25 MG: 25 TABLET, FILM COATED ORAL at 18:25

## 2018-07-19 RX ADMIN — HEPARIN SODIUM 6000 UNITS: 200 INJECTION, SOLUTION INTRAVENOUS at 12:48

## 2018-07-19 RX ADMIN — SODIUM CHLORIDE: 9 INJECTION, SOLUTION INTRAVENOUS at 11:10

## 2018-07-19 RX ADMIN — LISINOPRIL 5 MG: 5 TABLET ORAL at 18:24

## 2018-07-19 RX ADMIN — FAMOTIDINE 20 MG: 20 TABLET ORAL at 18:25

## 2018-07-19 ASSESSMENT — COPD QUESTIONNAIRES
COPD SCREENING SCORE: 5
DURING THE PAST 4 WEEKS HOW MUCH DID YOU FEEL SHORT OF BREATH: SOME OF THE TIME
HAVE YOU SMOKED AT LEAST 100 CIGARETTES IN YOUR ENTIRE LIFE: NO/DON'T KNOW
IN THE PAST 12 MONTHS DO YOU DO LESS THAN YOU USED TO BECAUSE OF YOUR BREATHING PROBLEMS: AGREE
DO YOU EVER COUGH UP ANY MUCUS OR PHLEGM?: YES, A FEW DAYS A WEEK OR MONTH

## 2018-07-19 ASSESSMENT — ENCOUNTER SYMPTOMS
FEVER: 0
BLURRED VISION: 0
SPEECH CHANGE: 0
EYE PAIN: 0
NERVOUS/ANXIOUS: 0
WHEEZING: 0
LOSS OF CONSCIOUSNESS: 0
HEMOPTYSIS: 0
PALPITATIONS: 0
DEPRESSION: 0
CHILLS: 0
BRUISES/BLEEDS EASILY: 0
VOMITING: 0
EYE DISCHARGE: 0
MYALGIAS: 0
NAUSEA: 0
ABDOMINAL PAIN: 0
COUGH: 0

## 2018-07-19 ASSESSMENT — LIFESTYLE VARIABLES: ALCOHOL_USE: NO

## 2018-07-19 ASSESSMENT — COGNITIVE AND FUNCTIONAL STATUS - GENERAL
MOVING TO AND FROM BED TO CHAIR: A LITTLE
TOILETING: A LITTLE
WALKING IN HOSPITAL ROOM: A LITTLE
DRESSING REGULAR LOWER BODY CLOTHING: A LITTLE
MOVING FROM LYING ON BACK TO SITTING ON SIDE OF FLAT BED: A LITTLE
MOBILITY SCORE: 18
PERSONAL GROOMING: A LITTLE
EATING MEALS: A LITTLE
DRESSING REGULAR UPPER BODY CLOTHING: A LITTLE
STANDING UP FROM CHAIR USING ARMS: A LITTLE
TURNING FROM BACK TO SIDE WHILE IN FLAT BAD: A LITTLE
SUGGESTED CMS G CODE MODIFIER MOBILITY: CK
SUGGESTED CMS G CODE MODIFIER DAILY ACTIVITY: CK
HELP NEEDED FOR BATHING: A LITTLE
DAILY ACTIVITIY SCORE: 18
CLIMB 3 TO 5 STEPS WITH RAILING: A LITTLE

## 2018-07-19 ASSESSMENT — PAIN SCALES - GENERAL
PAINLEVEL_OUTOF10: 0

## 2018-07-19 ASSESSMENT — PATIENT HEALTH QUESTIONNAIRE - PHQ9
SUM OF ALL RESPONSES TO PHQ9 QUESTIONS 1 AND 2: 0
1. LITTLE INTEREST OR PLEASURE IN DOING THINGS: NOT AT ALL
2. FEELING DOWN, DEPRESSED, IRRITABLE, OR HOPELESS: NOT AT ALL

## 2018-07-19 NOTE — H&P
Physician H&P    Patient ID:  Donte Alicea [facility]  [id number]  77 y.o. female  1940    History:  Primary Diagnosis: Atypical atrial flutter intolerance to AA meds for EPS and RFA and AV izzy RFA    HPI:  PPM Stevenson. Persistent atrial fib and now atypical atrial flutter symptomatic. Multiple atrial arrhythmias. Previous LA isthmus RFA post wall and PVI. PPM for pauses.On coumadin. No constitutional symptoms.    Past Medical History:  has a past medical history of A-fib (HCC); Anesthesia; Anticoagulant long-term use (1/12/2012); Arthritis; Asthma; Atrial fibrillation (HCC); Backpain; Breath shortness; Bronchitis (Nov, 2013); CAD (coronary artery disease); Depression; Glaucoma (5/3/2011); Hematoma complicating a procedure (11/3/2012); High cholesterol; Hypertension; Hypothyroid; Lupus; Macular degeneration; Menopause (1/12/2012); Mitral regurgitation (10/30/2012); Obesity (1/12/2012); Pneumonia (feb,2013); Pre-syncope (6/29/2018); Pulmonary hypertension (HCC) (10/30/2012); PVC's (premature ventricular contractions) (1/12/2012); Senile nuclear sclerosis; Spinal stenosis of lumbar region at multiple levels; Unspecified cataract; and Unspecified urinary incontinence.  Past Surgical History:  has a past surgical history that includes tonsillectomy and adenoidectomy; recovery (11/30/2010); colonoscopy (2008); abdominal hysterectomy total (April 15,1975); other; cataract phaco with iol (4/21/2015); cataract phaco with iol (Right, 5/5/2015); open reduction; knee arthroplasty total (Left, 5/28/2015); and knee arthroplasty total (Right, 6/23/2016).  Past Social History:  reports that she has never smoked. She has never used smokeless tobacco. She reports that she does not drink alcohol or use drugs.  Past Family History:   Family History   Problem Relation Age of Onset   • Stroke Mother    • Diabetes Father    • Stroke Sister    • Heart Disease Brother    • Stroke Sister    • GI Daughter      Crohn's Disease   • Heart Disease  Daughter      CHF   • Other Daughter      Chronic Pain--Lymphedema   • Cancer Paternal Aunt      Allergies: Amiodarone; Bactrim; Claritin-d [loratadine-pseudoephedrine]; Clotrimazole; Metoprolol; Morphine; Qvar [beclomethasone dipropionate]; Vibramycin; Atorvastatin calcium-polysorbate 80; Augmentin; Cipro xr; Diltiazem; Flecainide; Keflex; Mucinex; Tramadol; Phytoplex z-guard [petrolatum-zinc oxide]; Atorvastatin; and Tape    Current Medications:  Prior to Admission medications    Medication Sig Start Date End Date Taking? Authorizing Provider   magnesium oxide (MAG-OX) 400 MG Tab  5/5/18   Physician Outpatient   atenolol (TENORMIN) 50 MG Tab Take 1.5 Tabs by mouth 2 times a day. 7/3/18   Anabel Lees A.P.R.N.   spironolactone (ALDACTONE) 50 MG Tab Take 25 mg by mouth every day.    Physician Outpatient   furosemide (LASIX) 40 MG Tab Take 2 Tabs by mouth every day. 80 mg in the AM 5/30/18   Kishore Price M.D.   levothyroxine (SYNTHROID) 50 MCG Tab TAKE 1 TABLET BY MOUTH EVERY MORNING ON AN EMPTY STOMACH. 5/7/18   Kishore Price M.D.   raNITidine (ZANTAC) 150 MG Tab TAKE ONE TABLET BY MOUTH TWICE DAILY 4/30/18   Kishore Price M.D.   lisinopril (PRINIVIL) 5 MG Tab TAKE ONE TABLET BY MOUTH EVERY DAY 3/26/18   Lizzy Haywood M.D.   estradiol (ESTRACE) 2 MG Tab TAKE ONE TABLET BY MOUTH EVERY DAY 2/6/18   Kishore Price M.D.   magnesium oxide (MAG-OX) 400 (241.3 Mg) MG Tab tablet Take 1 Tab by mouth every day. 12/23/17   Mana Byrne, A.P.N.   warfarin (COUMADIN) 2.5 MG Tab Take 2.5-3.75 mg by mouth every day. 3.75 mg on Monday and Wednesday  2.5 mg all other days    Physician Outpatient   sertraline (ZOLOFT) 25 MG tablet Take 1 Tab by mouth every day. 12/5/17   Kishore Price M.D.   digoxin (LANOXIN) 125 MCG Tab Take 1 Tab by mouth every day. 12/5/17   Kishore Price M.D.   Multiple Vitamins-Minerals (ICAPS AREDS 2) Cap Take 2 Caps by mouth 2 Times a Day.  "8/14/17   VICTORIANO Sanchez   acetaminophen (TYLENOL) 500 MG TABS Take 1,000 mg by mouth 3 times a day. Indications: Pain    Physician Outpatient   vitamin D (CHOLECALCIFEROL) 1000 UNIT TABS Take 2,000 Units by mouth every day.    Physician Outpatient       Review of Systems:  Review of Systems   Constitutional: Positive for malaise/fatigue. Negative for chills and fever.   HENT: Negative for congestion.    Eyes: Negative for blurred vision, pain and discharge.   Respiratory: Negative for cough, hemoptysis and wheezing.    Cardiovascular: Negative for chest pain and palpitations.   Gastrointestinal: Negative for abdominal pain, nausea and vomiting.   Musculoskeletal: Negative for joint pain and myalgias.   Skin: Negative for itching and rash.   Neurological: Negative for speech change and loss of consciousness.   Endo/Heme/Allergies: Does not bruise/bleed easily.   Psychiatric/Behavioral: Negative for depression. The patient is not nervous/anxious.    All other systems reviewed and are negative.    Blood pressure 124/67, pulse (!) 108, temperature 36.5 °C (97.7 °F), resp. rate 18, height 1.626 m (5' 4\"), weight 80.3 kg (177 lb 0.5 oz), last menstrual period 01/01/1993, SpO2 98 %.    Physical Examination:  Physical Exam   Constitutional: She is oriented to person, place, and time. She appears well-developed. No distress.   HENT:   Mouth/Throat: Mucous membranes are normal.   Eyes: Conjunctivae and EOM are normal.   Neck: No JVD present. No tracheal deviation present. No thyroid mass and no thyromegaly present.   Cardiovascular: Normal rate and intact distal pulses.  A regularly irregular rhythm present.   No murmur heard.  Pulmonary/Chest: Effort normal and breath sounds normal. No respiratory distress. She exhibits no tenderness.   Abdominal: Soft. There is no tenderness.   Musculoskeletal: Normal range of motion. She exhibits no edema.   Neurological: She is alert and oriented to person, place, and " time. She has normal strength. She displays no tremor.   Skin: Skin is warm and dry. She is not diaphoretic.   Psychiatric: She has a normal mood and affect. Her behavior is normal.   Vitals reviewed.      Impression:  1. Atrial flutter probable LA will map. Did discuss AV izzy RFA and she wishes to proceed with both secondary the high likelihood of recurrent atrial arrhythmias with intolerance to AA meds.  The risks, benefits,and alternatives to atrial fibrillation/atrial flutter/av node ablation with general anesthesia were discussed in great detail. Specific risks mentioned including bleeding, infection, arteriovenous fistula/pseudoaneurysm related to sheath placement, cardiac perforation with possible tamponade requiring pericardiocentesis or possible open heart surgery were discussed. In addition,we discussed atrial fibrillation ablation specific complications including pulmonary vein stenosis, left atrial perforation (2-4%), and nerve damage involving the recurrent laryngeal nerve, the vagus nerve with resulting gastroparesis, and the phrenic nerve.  Also mentioned was a 1/400 (0.25%) occurrence of atrial esophageal fistula, which is an abnormal communication between the esophagus and the left atrium; this complication is often fatal. Lastly the risks of death, myocardial infarction, stroke, DVT and PE were discussed. The patient verbalized understanding of these potential complications and wishes to proceed with this procedure.  The risks, benefits, and alternatives to transesophageal echocardiogram with IV sedation were discussed with the patient in specific detail, including oropharyngeal and esophageal traumas including hoarseness and dysphagia after the procedure. Rare cases demonstrating serious or fatal complications associated with transesophageal echocardiogram have been reported in the adult population, including cardiac, pulmonary and bleeding complications in less than 1% of people. Patients with  an identified intracardiac thrombus are at increased risk for embolic events and this appears to be reduced with anticoagulant therapy. The patient verbalized understandings about these  possible complications and wishes to proceed with this procedure          Pre Procedure Assessment:  Pre-Procedure Assessment - Applicable for patients receiving sedation by non-anesthesia practitioner.  Previous drug history, other anesthetic experiences, potential anesthetic problems and choice of anesthesia assessed, discussed with patient.     No prior complications.  Results of relevant diagnostic studies reviewed.    Plan of Sedation:  Patient assessed immediately prior to sedationPatient deemed appropriate candidate for conscious sedation options and plans discussed with patient / family    ASA 3 - Patient with severe systemic disease

## 2018-07-19 NOTE — CATH LAB
Electrophysiology Procedure Note  [facility]    Indication:Recurrent atrial arrhythmias intolerance to AA meds.    Procedure Performed: 1. Atrial fibrillation ablation 2. Advanced mapping using CARTO system  3. Ablation of secondary cause of atrial fib (post wall isolation). 4. Ablation of secondary arrhythmia (AT) 5. Ablation of AV node. 6. Transesophageal echocardiography 5. Intracardiac echocardiography 7. Esophageal temperature monitoring 8. Intraarterial blood pressure monitoring 8. Frequent ACT's 9. {re and post reprogramming of dual chamber pacemaker.    Physician(s): GABRIEL Matthews M.D.     Resident/Assistant(s): None     Anesthesia: General endotracheal anesthetic.    Anaesthesiologist: Dr Swain    Specimen(s) Removed: None     Estimated Blood Loss:  30cc     Complications:  None    Pre-procedure ECG: atrial tachycardia with variable reponse    Post Procedure ECG: Dual chamber pacing    Description of Procedure:   After informed written consent, the patient was brought to the EP lab in the fasting, non-sedated state. The patient was prepped and draped in the usual sterile fashion.  Patients PPM was interrogated and programmed VVI at 30 BPM. Patient was in an underlying atrial arrhythmia at 150 BPM. ENA showed no evidence of LA clot. Arterial line placed for monitoring. Esophageal temperature probe placed and monitored.  Femoral venous access was obtained using the modified Seldinger technique. In the left femoral vein, 3 sheaths (6,8,8 Fr) were inserted over 0.35” guidewires. In the right femoral vein, 1 sheaths (10.5 Fr) were inserted over 0.35” guidewires. A deflectable decapolar catheter was advanced to the CS position. Activation was proximal to medial along the CS. Entrainment on the right was long everywhere except the posterior septum. The proximal CS and distal CS entrainment was also long.  An intracardiac echo (ICE) catheter was advanced to the right atrium. ICE was used to identify  the atrial septum, left atrial appendage, and pulmonary veins and jaydon the anatomic structures to superimpose on our 3D electroanatomic map using CARTOSound. During the procedure, ICE was utilized to localize the ablation catheter, monitor for thrombus formation, and to exclude pericardial effusion (a small PEF seen on baseline ENA and ICE which never changed). Intravenous heparin was administered to maintain -350 seconds. Double transseptal left heart catheterization was performed under intracardiac echo and hemodynamic guidance. A Joppa needle was inserted into the two 8 Fr sheaths (SL1) for transseptal puncture and placement of sheaths in the left atrium. Mapping of the pulmonary veins and left atrium was performed using CARTO3 FAM ST/SF D/F and a deflectable multipolar mapping catheter ([employer]).  The left veins and right lower vein had reattached but were not driving the tachycardia. The posterior wall was also reconnected. LA isthmus continued to be blocked. The YASMINE CL was twice as fast as the rest of the atrium. During entrainment in YASMINE the tachycardia went back down to the LA CL. Segmental ablation was performed using an 3.5 mm deflectable irrigated force contact catheter ([employer] ST/SF) at primarily 25 W (posteriorly) to 35 W ( anteriorly)  power starting from the L sided veins and then to the RIPV  All three veins were reisolated. The posterior wall  was reisolated with a box set and some spot ablations. The tachycardia was recurrent and reinitiated and was earliest just inferior and anterior to the YASMINE. Ablation was performed and the tachycardia terminated and stopped reinitiating. Next we pulled the ablation catheter to the RA and the AV node was ablated with CHB. The PPM was reprogrammed at DDIR at 80 BPM and had excellent thresholds. At the end of the procedure, heparin was reversed with protamine, the catheter and sheaths were removed, and hemostasis was achieved by manual  compression. Following recovery from anesthesia, the patient was transferred to the PPU.     Total ablation time: 1940 seconds    Fluoroscopy time: 8.9 minutes     Electrophysiologic Findings:    1. Baseline: Atrial tachycardia   404 ms, AH 92 ms, HV 63 ms. .  2. Final  SR with dual chamber pacing at 80 BPM.    Impressions:    1. Baseline atrial tachycardia near YASMINE ablated and terminated.  2. Reisolation of three veins lefts and right lower and posterior wall.  3. Ablation of AV node.        Recommendations:  1. Resume anticoagulation.  2. Admit to monitored bedrest.

## 2018-07-20 VITALS
OXYGEN SATURATION: 98 % | HEART RATE: 84 BPM | DIASTOLIC BLOOD PRESSURE: 79 MMHG | RESPIRATION RATE: 18 BRPM | TEMPERATURE: 97 F | BODY MASS INDEX: 32.03 KG/M2 | HEIGHT: 64 IN | SYSTOLIC BLOOD PRESSURE: 102 MMHG | WEIGHT: 187.61 LBS

## 2018-07-20 LAB
ALBUMIN SERPL BCP-MCNC: 3.6 G/DL (ref 3.2–4.9)
ALBUMIN/GLOB SERPL: 1.2 G/DL
ALP SERPL-CCNC: 64 U/L (ref 30–99)
ALT SERPL-CCNC: 10 U/L (ref 2–50)
ANION GAP SERPL CALC-SCNC: 12 MMOL/L (ref 0–11.9)
AST SERPL-CCNC: 23 U/L (ref 12–45)
BILIRUB SERPL-MCNC: 0.6 MG/DL (ref 0.1–1.5)
BUN SERPL-MCNC: 18 MG/DL (ref 8–22)
CALCIUM SERPL-MCNC: 9.4 MG/DL (ref 8.5–10.5)
CHLORIDE SERPL-SCNC: 101 MMOL/L (ref 96–112)
CO2 SERPL-SCNC: 24 MMOL/L (ref 20–33)
CREAT SERPL-MCNC: 0.96 MG/DL (ref 0.5–1.4)
EKG IMPRESSION: NORMAL
ERYTHROCYTE [DISTWIDTH] IN BLOOD BY AUTOMATED COUNT: 51 FL (ref 35.9–50)
GLOBULIN SER CALC-MCNC: 3.1 G/DL (ref 1.9–3.5)
GLUCOSE SERPL-MCNC: 179 MG/DL (ref 65–99)
HCT VFR BLD AUTO: 45.6 % (ref 37–47)
HGB BLD-MCNC: 14.7 G/DL (ref 12–16)
MCH RBC QN AUTO: 31.3 PG (ref 27–33)
MCHC RBC AUTO-ENTMCNC: 32.2 G/DL (ref 33.6–35)
MCV RBC AUTO: 97.2 FL (ref 81.4–97.8)
PLATELET # BLD AUTO: 289 K/UL (ref 164–446)
PMV BLD AUTO: 10.5 FL (ref 9–12.9)
POTASSIUM SERPL-SCNC: 4.7 MMOL/L (ref 3.6–5.5)
PROT SERPL-MCNC: 6.7 G/DL (ref 6–8.2)
RBC # BLD AUTO: 4.69 M/UL (ref 4.2–5.4)
SODIUM SERPL-SCNC: 137 MMOL/L (ref 135–145)
WBC # BLD AUTO: 16.3 K/UL (ref 4.8–10.8)

## 2018-07-20 PROCEDURE — 700102 HCHG RX REV CODE 250 W/ 637 OVERRIDE(OP): Performed by: INTERNAL MEDICINE

## 2018-07-20 PROCEDURE — G0378 HOSPITAL OBSERVATION PER HR: HCPCS

## 2018-07-20 PROCEDURE — 80053 COMPREHEN METABOLIC PANEL: CPT

## 2018-07-20 PROCEDURE — 85027 COMPLETE CBC AUTOMATED: CPT

## 2018-07-20 PROCEDURE — A9270 NON-COVERED ITEM OR SERVICE: HCPCS | Performed by: INTERNAL MEDICINE

## 2018-07-20 PROCEDURE — 93005 ELECTROCARDIOGRAM TRACING: CPT | Performed by: INTERNAL MEDICINE

## 2018-07-20 PROCEDURE — 93010 ELECTROCARDIOGRAM REPORT: CPT | Performed by: INTERNAL MEDICINE

## 2018-07-20 PROCEDURE — 36415 COLL VENOUS BLD VENIPUNCTURE: CPT

## 2018-07-20 RX ORDER — OMEPRAZOLE 20 MG/1
20 CAPSULE, DELAYED RELEASE ORAL
Qty: 30 CAP | Refills: 0 | Status: SHIPPED | OUTPATIENT
Start: 2018-07-21 | End: 2018-10-09

## 2018-07-20 RX ADMIN — LISINOPRIL 5 MG: 5 TABLET ORAL at 05:29

## 2018-07-20 RX ADMIN — FAMOTIDINE 20 MG: 20 TABLET ORAL at 05:29

## 2018-07-20 RX ADMIN — FUROSEMIDE 80 MG: 40 TABLET ORAL at 05:29

## 2018-07-20 RX ADMIN — SERTRALINE 25 MG: 50 TABLET, FILM COATED ORAL at 05:28

## 2018-07-20 RX ADMIN — OMEPRAZOLE 20 MG: 20 CAPSULE, DELAYED RELEASE ORAL at 08:23

## 2018-07-20 RX ADMIN — LEVOTHYROXINE SODIUM 50 MCG: 50 TABLET ORAL at 05:29

## 2018-07-20 RX ADMIN — ATENOLOL 50 MG: 50 TABLET ORAL at 05:29

## 2018-07-20 RX ADMIN — SPIRONOLACTONE 25 MG: 25 TABLET, FILM COATED ORAL at 05:29

## 2018-07-20 ASSESSMENT — PAIN SCALES - GENERAL
PAINLEVEL_OUTOF10: 0

## 2018-07-20 ASSESSMENT — PATIENT HEALTH QUESTIONNAIRE - PHQ9
2. FEELING DOWN, DEPRESSED, IRRITABLE, OR HOPELESS: NOT AT ALL
SUM OF ALL RESPONSES TO PHQ9 QUESTIONS 1 AND 2: 0
1. LITTLE INTEREST OR PLEASURE IN DOING THINGS: NOT AT ALL

## 2018-07-20 NOTE — CARE PLAN
Problem: Knowledge Deficit  Goal: Knowledge of disease process/condition, treatment plan, diagnostic tests, and medications will improve  Outcome: PROGRESSING AS EXPECTED  Patient is educated of disease process and condition. Treatment team has included patient in plan of care. All medications indications and side effects are explained. Patient is encouraged to ask questions. Patient indicates understanding.    Problem: Skin Integrity  Goal: Risk for impaired skin integrity will decrease  Outcome: PROGRESSING AS EXPECTED  Patient skin assessed. Risk factors that lead to skin breakdown/impairment identified. Interventions to prevent skin breakdown are in place.Will continue to monitor skin integrity.

## 2018-07-20 NOTE — OR NURSING
1630 patient arrived from cath lab s/p a.fib ablation, patient lethargic able to wake on request answers questions approprietly, patient denies pain, s.o.b, plan of care discussed with patient and family agreeable,   1700 patient wide awake, patient c/o generalized pain offered pain meds patient refused wants to wait.    1726 left groin bleeding mp applied for 15 minutes bleeding stopped gauze tegaderm and pressure dressing applied.   1748 criteria met to transfer patient out of ppu  1755 patient transported to room T703 bed 2 with all her personal belongings.  1800 bed side report given to noah DELGADO T703 bed 2 all questions answered, checked bilateral groin dressing with DANNY zamora no bleeding no hematoma, patient not in any distress.

## 2018-07-20 NOTE — PROGRESS NOTES
Pt transferred to the floor from PACU. Assessment performed, VS taken. Pain assessed.   Monitor box on the pt, monitor room notified. Personal belongings with the pt.  Bed in low position bed alarm on, call light within the reach. Pt oriented to the room. All questions answered.

## 2018-07-20 NOTE — PROGRESS NOTES
Patient discharged, vital signs stable. Patient and  verbalized understanding of discharge instructions. No complaints. IV removed. Transported down to cart VIA wheelchair.

## 2018-07-20 NOTE — DISCHARGE SUMMARY
DISCHARGE DIAGNOSES:  1.  Recurrent paroxysmal atrial fibrillation.  2.  Atrial tachycardia.  3.  Chronic anticoagulation.  4.  Chronic kidney disease.  5.  Hypothyroidism.  6.  Coronary artery disease.  7.  Multiple drug allergies.  8.  Dual-chamber pacemaker.    HISTORY OF PRESENT HOSPITALIZATION:  The patient is a 77-year-old    female who is followed longitudinally in our office by Dr. Bo Matthews.  The   patient has had a previous atrial fib ablation, but has had recurrent episodes   of atrial fibrillation and now atrial tachycardia with rapid ventricular   response rates resistant to rate control due to hypotension.  Patient   therefore was admitted electively for repeat pulmonary vein isolation and   atrial tachycardia ablation.  On 07/19/2018, please see procedure report.    Complications of the procedure were none.    Conclusions of the procedure were as follows:  1.  Baseline atrial tachycardia near the left atrial appendage ablated and   terminated.  2.  Re-isolation of 3 veins, left, right lower and posterior wall.  3.  Ablation of the AV node.  Patient has a dual-chamber pacemaker in place.    Pacemaker was programmed to DDIR at a rate of 80 at the end of the procedure.    Patient has done well through the night, heart rates have been in the 80s,   blood pressure 102/79.  White count was slightly elevated post-procedure at   16.3, hematocrit 14.7 and 45.6.  Electrolytes:  Sodium 137, potassium 4.7,   chloride 101, CO2 24, BUN 18, creatinine 0.96, EGFR was 56.    EKG morning demonstrating dual-chamber pacing.  Weight preprocedure was 177,   postprocedure 187.  She had some mild edema in her left hand and arm and   bilateral lower extremities.    DISCHARGE MEDICATIONS:  Will include atenolol 50 mg daily, omeprazole 20 mg   daily, Zantac 150 mg twice daily, furosemide 80 mg daily, Synthroid 50 mcg   daily, lisinopril 5 mg daily, Zoloft 25 mg daily, spironolactone 25 mg daily.    She will continue  her warfarin.  Her INR was mildly elevated at 3.15 prior to   procedure.  She will resume her regular doses, which have been 3.75 mg on   Monday and Wednesday and 2.5 mg other days.  INR will be completed in 1 week.    She will continue her other medications as previously noted.    IMPRESSION:  1.  History of atrial tachycardia, status post ablation by Dr. Bo Matthews   07/19/2018.  2.  Paroxysmal atrial fibrillation with prior pulmonary vein isolation with   repeat procedure as noted 07/19/2018 by Dr. Bo Matthews.  3.  Ablation of AV node on 07/19/2018.  4.  Permanent dual-chamber pacemaker, [employer] with 3 programming of   device to a DDD I mode at a rate of 80.  5.  Chronic anticoagulation in the form of Coumadin.  6.  Bilateral lower extremity edema, chronic.  7.  Spinal stenosis.  8.  Coronary artery disease.    PLAN:  Patient will be discharged home.  She will have an INR completed in 1   week.  She will report to the emergency room for any problem with vascular   access sites, which all looked good, intact and clean and dry here today.  She   will also report to the emergency room for any of the following, increasing   chest pain, increasing shortness of breath, focal neurological changes or   hemoptysis or temperatures of 101 or greater.  If she has recurrence of atrial   fibrillation with a duration of 2 hours or greater, she will call our office.       ____________________________________     MEGAN Wilkerson / DELIO    DD:  07/20/2018 10:40:31  DT:  07/20/2018 11:13:07    D#:  [id number]  Job#:  [id number]

## 2018-07-20 NOTE — DISCHARGE INSTRUCTIONS
Discharge Instructions    Discharged to home by car with relative. Discharged via wheelchair, hospital escort: Yes.  Special equipment needed: Not Applicable    Be sure to schedule a follow-up appointment with your primary care doctor or any specialists as instructed.     Discharge Plan:   Diet Plan: Discussed  Activity Level: Discussed  Confirmed Follow up Appointment: Patient to Call and Schedule Appointment  Confirmed Symptoms Management: Discussed  Medication Reconciliation Updated: Yes  Influenza Vaccine Indication: Not indicated: Previously immunized this influenza season and > 8 years of age    I understand that a diet low in cholesterol, fat, and sodium is recommended for good health. Unless I have been given specific instructions below for another diet, I accept this instruction as my diet prescription.   Other diet: Regular/ Heart healthy diet     Special Instructions: No lifting > 10 lbs x 1 week.  No baths or hot tubs x 1 week.  May shower tomorrow and take off groin dressings.  Continue to monitor sites daily for warmth, redness, discolored drainage     Please take the esophageal protection medication that is prescribed to you for one month post procedure without missed doses.  Please do not miss any doses of your blood thinner.       Please walk and take deep breaths after discharge.  After discharge, if  experiences chest pain, shortness of breath, hemoptysis, neurological changes, high fever 101 or greater, pre syncope/syncope, trouble with catheter sites needs to be seen in the emergency dept.   INR in one week.      Atrial Fibrillation  Introduction  Atrial fibrillation is a type of heartbeat that is irregular or fast (rapid). If you have this condition, your heart keeps quivering in a weird (chaotic) way. This condition can make it so your heart cannot pump blood normally. Having this condition gives a person more risk for stroke, heart failure, and other heart problems. There are different types  of atrial fibrillation. Talk with your doctor to learn about the type that you have.  Follow these instructions at home:  · Take over-the-counter and prescription medicines only as told by your doctor.  · If your doctor prescribed a blood-thinning medicine, take it exactly as told. Taking too much of it can cause bleeding. If you do not take enough of it, you will not have the protection that you need against stroke and other problems.  · Do not use any tobacco products. These include cigarettes, chewing tobacco, and e-cigarettes. If you need help quitting, ask your doctor.  · If you have apnea (obstructive sleep apnea), manage it as told by your doctor.  · Do not drink alcohol.  · Do not drink beverages that have caffeine. These include coffee, soda, and tea.  · Maintain a healthy weight. Do not use diet pills unless your doctor says they are safe for you. Diet pills may make heart problems worse.  · Follow diet instructions as told by your doctor.  · Exercise regularly as told by your doctor.  · Keep all follow-up visits as told by your doctor. This is important.  Contact a doctor if:  · You notice a change in the speed, rhythm, or strength of your heartbeat.  · You are taking a blood-thinning medicine and you notice more bruising.  · You get tired more easily when you move or exercise.  Get help right away if:  · You have pain in your chest or your belly (abdomen).  · You have sweating or weakness.  · You feel sick to your stomach (nauseous).  · You notice blood in your throw up (vomit), poop (stool), or pee (urine).  · You are short of breath.  · You suddenly have swollen feet and ankles.  · You feel dizzy.  · Your suddenly get weak or numb in your face, arms, or legs, especially if it happens on one side of your body.  · You have trouble talking, trouble understanding, or both.  · Your face or your eyelid droops on one side.  These symptoms may be an emergency. Do not wait to see if the symptoms will go away.  Get medical help right away. Call your local emergency services (911 in the [place] Do not drive yourself to the hospital.   This information is not intended to replace advice given to you by your health care provider. Make sure you discuss any questions you have with your health care provider.  Document Released: 09/26/2009 Document Revised: 05/25/2017 Document Reviewed: 04/13/2016  © 2017 [employer]      · Is patient discharged on Warfarin / Coumadin?   Yes    You are receiving the drug warfarin. Please understand the importance of monitoring warfarin with scheduled PT/INR blood draws.  Follow-up with a call to your personal Doctor's office in 3 days to schedule a PT/INR. .    IMPORTANT: HOW TO USE THIS INFORMATION:  This is a summary and does NOT have all possible information about this product. This information does not assure that this product is safe, effective, or appropriate for you. This information is not individual medical advice and does not substitute for the advice of your health care professional. Always ask your health care professional for complete information about this product and your specific health needs.      WARFARIN - ORAL (WARF-uh-rin)      COMMON BRAND NAME(S): Coumadin      WARNING:  Warfarin can cause very serious (possibly fatal) bleeding. This is more likely to occur when you first start taking this medication or if you take too much warfarin. To decrease your risk for bleeding, your doctor or other health care provider will monitor you closely and check your lab results (INR test) to make sure you are not taking too much warfarin. Keep all medical and laboratory appointments. Tell your doctor right away if you notice any signs of serious bleeding. See also Side Effects section.      USES:  This medication is used to treat blood clots (such as in deep vein thrombosis-DVT or pulmonary embolus-PE) and/or to prevent new clots from forming in your body. Preventing harmful blood clots helps to  "reduce the risk of a stroke or heart attack. Conditions that increase your risk of developing blood clots include a certain type of irregular heart rhythm (atrial fibrillation), heart valve replacement, recent heart attack, and certain surgeries (such as hip/knee replacement). Warfarin is commonly called a \"blood thinner,\" but the more correct term is \"anticoagulant.\" It helps to keep blood flowing smoothly in your body by decreasing the amount of certain substances (clotting proteins) in your blood.      HOW TO USE:  Read the Medication Guide provided by your pharmacist before you start taking warfarin and each time you get a refill. If you have any questions, ask your doctor or pharmacist. Take this medication by mouth with or without food as directed by your doctor or other health care professional, usually once a day. It is very important to take it exactly as directed. Do not increase the dose, take it more frequently, or stop using it unless directed by your doctor. Dosage is based on your medical condition, laboratory tests (such as INR), and response to treatment. Your doctor or other health care provider will monitor you closely while you are taking this medication to determine the right dose for you. Use this medication regularly to get the most benefit from it. To help you remember, take it at the same time each day. It is important to eat a balanced, consistent diet while taking warfarin. Some foods can affect how warfarin works in your body and may affect your treatment and dose. Avoid sudden large increases or decreases in your intake of foods high in vitamin K (such as broccoli, cauliflower, cabbage, brussels sprouts, kale, spinach, and other green leafy vegetables, liver, green tea, certain vitamin supplements). If you are trying to lose weight, check with your doctor before you try to go on a diet. Cranberry products may also affect how your warfarin works. Limit the amount of cranberry juice (16 " ounces/480 milliliters a day) or other cranberry products you may drink or eat.      SIDE EFFECTS:  Nausea, loss of appetite, or stomach/abdominal pain may occur. If any of these effects persist or worsen, tell your doctor or pharmacist promptly. Remember that your doctor has prescribed this medication because he or she has judged that the benefit to you is greater than the risk of side effects. Many people using this medication do not have serious side effects. This medication can cause serious bleeding if it affects your blood clotting proteins too much (shown by unusually high INR lab results). Even if your doctor stops your medication, this risk of bleeding can continue for up to a week. Tell your doctor right away if you have any signs of serious bleeding, including: unusual pain/swelling/discomfort, unusual/easy bruising, prolonged bleeding from cuts or gums, persistent/frequent nosebleeds, unusually heavy/prolonged menstrual flow, pink/dark urine, coughing up blood, vomit that is bloody or looks like coffee grounds, severe headache, dizziness/fainting, unusual or persistent tiredness/weakness, bloody/black/tarry stools, chest pain, shortness of breath, difficulty swallowing. Tell your doctor right away if any of these unlikely but serious side effects occur: persistent nausea/vomiting, severe stomach/abdominal pain, yellowing eyes/skin. This drug rarely has caused very serious (possibly fatal) problems if its effects lead to small blood clots (usually at the beginning of treatment). This can lead to severe skin/tissue damage that may require surgery or amputation if left untreated. Patients with certain blood conditions (protein C or S deficiency) may be at greater risk. Get medical help right away if any of these rare but serious side effects occur: painful/red/purplish patches on the skin (such as on the toe, breast, abdomen), change in the amount of urine, vision changes, confusion, slurred speech,  weakness on one side of the body. A very serious allergic reaction to this drug is rare. However, get medical help right away if you notice any symptoms of a serious allergic reaction, including: rash, itching/swelling (especially of the face/tongue/throat), severe dizziness, trouble breathing. This is not a complete list of possible side effects. If you notice other effects not listed above, contact your doctor or pharmacist. In the [place] - Call your doctor for medical advice about side effects. You may report side effects to FDA at 0-848-LPP-8659. In [place] - Call your doctor for medical advice about side effects. You may report side effects to [employer] at 1-648.104.4361.      PRECAUTIONS:  Before taking warfarin, tell your doctor or pharmacist if you are allergic to it; or if you have any other allergies. This product may contain inactive ingredients, which can cause allergic reactions or other problems. Talk to your pharmacist for more details. Before using this medication, tell your doctor or pharmacist your medical history, especially of: blood disorders (such as anemia, hemophilia), bleeding problems (such as bleeding of the stomach/intestines, bleeding in the brain), blood vessel disorders (such as aneurysms), recent major injury/surgery, liver disease, alcohol use, mental/mood disorders (including memory problems), frequent falls/injuries. It is important that all your doctors and dentists know that you take warfarin. Before having surgery or any medical/dental procedures, tell your doctor or dentist that you are taking this medication and about all the products you use (including prescription drugs, nonprescription drugs, and herbal products). Avoid getting injections into the muscles. If you must have an injection into a muscle (for example, a flu shot), it should be given in the arm. This way, it will be easier to check for bleeding and/or apply pressure bandages. This medication may cause stomach  bleeding. Daily use of alcohol while using this medicine will increase your risk for stomach bleeding and may also affect how this medication works. Limit or avoid alcoholic beverages. If you have not been eating well, if you have an illness or infection that causes fever, vomiting, or diarrhea for more than 2 days, or if you start using any antibiotic medications, contact your doctor or pharmacist immediately because these conditions can affect how warfarin works. This medication can cause heavy bleeding. To lower the chance of getting cut, bruised, or injured, use great caution with sharp objects like safety razors and nail cutters. Use an electric razor when shaving and a soft toothbrush when brushing your teeth. Avoid activities such as contact sports. If you fall or injure yourself, especially if you hit your head, call your doctor immediately. Your doctor may need to check you. The Food & Drug Administration has stated that generic warfarin products are interchangeable. However, consult your doctor or pharmacist before switching warfarin products. Be careful not to take more than one medication that contains warfarin unless specifically directed by the doctor or health care provider who is monitoring your warfarin treatment. Older adults may be at greater risk for bleeding while using this drug. This medication is not recommended for use during pregnancy because of serious (possibly fatal) harm to an unborn baby. Discuss the use of reliable forms of birth control with your doctor. If you become pregnant or think you may be pregnant, tell your doctor immediately. If you are planning pregnancy, discuss a plan for managing your condition with your doctor before you become pregnant. Your doctor may switch the type of medication you use during pregnancy. Very small amounts of this medication may pass into breast milk but is unlikely to harm a nursing infant. Consult your doctor before breast-feeding.      DRUG  "INTERACTIONS:  Drug interactions may change how your medications work or increase your risk for serious side effects. This document does not contain all possible drug interactions. Keep a list of all the products you use (including prescription/nonprescription drugs and herbal products) and share it with your doctor and pharmacist. Do not start, stop, or change the dosage of any medicines without your doctor's approval. Warfarin interacts with many prescription, nonprescription, vitamin, and herbal products. This includes medications that are applied to the skin or inside the vagina or rectum. The interactions with warfarin usually result in an increase or decrease in the \"blood-thinning\" (anticoagulant) effect. Your doctor or other health care professional should closely monitor you to prevent serious bleeding or clotting problems. While taking warfarin, it is very important to tell your doctor or pharmacist of any changes in medications, vitamins, or herbal products that you are taking. Some products that may interact with this drug include: capecitabine, imatinib, mifepristone. Aspirin, aspirin-like drugs (salicylates), and nonsteroidal anti-inflammatory drugs (NSAIDs such as ibuprofen, naproxen, celecoxib) may have effects similar to warfarin. These drugs may increase the risk of bleeding problems if taken during treatment with warfarin. Carefully check all prescription/nonprescription product labels (including drugs applied to the skin such as pain-relieving creams) since the products may contain NSAIDs or salicylates. Talk to your doctor about using a different medication (such as acetaminophen) to treat pain/fever. Low-dose aspirin and related drugs (such as clopidogrel, ticlopidine) should be continued if prescribed by your doctor for specific medical reasons such as heart attack or stroke prevention. Consult your doctor or pharmacist for more details. Many herbal products interact with warfarin. Tell your " doctor before taking any herbal products, especially bromelains, coenzyme Q10, cranberry, danshen, dong quai, fenugreek, garlic, ginkgo biloba, ginseng, and Mary's wort, among others. This medication may interfere with a certain laboratory test to measure theophylline levels, possibly causing false test results. Make sure laboratory personnel and all your doctors know you use this drug.      OVERDOSE:  If overdose is suspected, contact a poison control center or emergency room immediately. [place] residents can call the  National Poison Hotline at 1-679.292.4664. [place] residents can call a provincial poison control center. Symptoms of overdose may include: bloody/black/tarry stools, pink/dark urine, unusual/prolonged bleeding.      NOTES:  Do not share this medication with others. Laboratory and/or medical tests (such as INR, complete blood count) must be performed periodically to monitor your progress or check for side effects. Consult your doctor for more details.      MISSED DOSE:  For the best possible benefit, do not miss any doses. If you do miss a dose and remember on the same day, take it as soon as you remember. If you remember on the next day, skip the missed dose and resume your usual dosing schedule. Do not double the dose to catch up because this could increase your risk for bleeding. Keep a record of missed doses to give to your doctor or pharmacist. Contact your doctor or pharmacist if you miss 2 or more doses in a row.      STORAGE:  Store at room temperature away from light and moisture. Do not store in the bathroom. Keep all medications away from children and pets. Do not flush medications down the toilet or pour them into a drain unless instructed to do so. Properly discard this product when it is  or no longer needed. Consult your pharmacist or local waste disposal company for more details about how to safely discard your product.      MEDICAL ALERT:  Your condition and medication can  cause complications in a medical emergency. For information about enrolling in MedicAlert, call 1-403.521.3576 ([place]) or 1-213.584.1047 ([place]).      Information last revised October 2010 Copyright(c) 2010 [employer]             Depression / Suicide Risk    As you are discharged from this [facility], it is important to learn how to keep safe from harming yourself.    Recognize the warning signs:  · Abrupt changes in personality, positive or negative- including increase in energy   · Giving away possessions  · Change in eating patterns- significant weight changes-  positive or negative  · Change in sleeping patterns- unable to sleep or sleeping all the time   · Unwillingness or inability to communicate  · Depression  · Unusual sadness, discouragement and loneliness  · Talk of wanting to die  · Neglect of personal appearance   · Rebelliousness- reckless behavior  · Withdrawal from people/activities they love  · Confusion- inability to concentrate     If you or a loved one observes any of these behaviors or has concerns about self-harm, here's what you can do:  · Talk about it- your feelings and reasons for harming yourself  · Remove any means that you might use to hurt yourself (examples: pills, rope, extension cords, firearm)  · Get professional help from the community (Mental Health, Substance Abuse, psychological counseling)  · Do not be alone:Call your Safe Contact- someone whom you trust who will be there for you.  · Call your local CRISIS HOTLINE 753-1154 or 178-587-1040  · Call your local Children's Mobile Crisis Response Team [place] (552) 849-8234 or www.OncoPep  · Call the toll free National Suicide Prevention Hotlines   · National Suicide Prevention Lifeline 903-739-JCBR (6492)  · National Hope Line Network 800-SUICIDE (168-8080)

## 2018-07-20 NOTE — PROGRESS NOTES
Received Bedside report. Assumed care at 1900. This pt is AOx4, currently on bedrest, denies pain. Patient and RN discussed plan of care: questions answered. Labs noted, assessment complete. Tele box in place. Pt is on 2L of O2 via NC. Call light in place, fall precautions in place, patient educated on importance of calling for assistance. No additional needs at this time. VSS

## 2018-07-20 NOTE — RESPIRATORY CARE
COPD EDUCATION by COPD CLINICAL EDUCATOR  7/20/2018 at 8:15 AM by Inez Hernandez     Patient interviewed by COPD education team. Patient refused COPD program at this time.

## 2018-07-24 ENCOUNTER — ANTICOAGULATION MONITORING (OUTPATIENT)
Dept: VASCULAR LAB | Facility: MEDICAL CENTER | Age: 78
End: 2018-07-24

## 2018-07-24 DIAGNOSIS — Z79.01 CHRONIC ANTICOAGULATION: ICD-10-CM

## 2018-07-24 LAB — INR PPP: 2.8 (ref 2–3.5)

## 2018-07-24 NOTE — PROGRESS NOTES
OP Anticoagulation Telephone Note    Date: 7/24/2018  Anticoagulation Summary  As of 7/24/2018    INR goal:   2.0-3.0   TTR:   70.1 % (3.1 y)   Today's INR:   2.8   Warfarin maintenance plan:   3.75 mg (2.5 mg x 1.5) on Mon; 2.5 mg (2.5 mg x 1) all other days   Weekly warfarin total:   18.75 mg   No change documented:   Roxana Mercedes, Med Ass't   Plan last modified:   Ivone Barraza, Tim (5/8/2018)   Next INR check:   8/7/2018   Target end date:   Indefinite    Indications    Atrial fibrillation (HCC) (Resolved) [I48.91]  Chronic anticoagulation [Z79.01]             Anticoagulation Episode Summary     INR check location:   Home Draw    Preferred lab:       Send INR reminders to:       Comments:   [place]       Anticoagulation Care Providers     Provider Role Specialty Phone number    Lizzy Haywood M.D. Referring Cardiology [phone]    [facility] Responsible  [phone]    Vladimir Taylor, PharmD Responsible          Anticoagulation Patient Findings  Patient Findings     Comments:   Had a Pacemaker put in recently and had an Ablation last Thursday 07/19/2018      Plan:  Spoke with patient on the phone. Patient is therapeutic today. Patient denies any changes in medications or diet. Patient denies any signs or symptoms of bleeding or clotting. Patient did have some bruising due to her getting a pacemaker placed and having an ablation last Thursday. Instructed patient to call clinic if any unusual bleeding or bruising occurs. Will continue dosing as outlined above. Will follow-up with patient in 2 weeks.    Roxana Mercedes, Medical Assistant    I have reviewed and concur with the above plan     Fran Leyva, PharmD

## 2018-07-30 RX ORDER — WARFARIN SODIUM 2.5 MG/1
TABLET ORAL
Qty: 135 TAB | Refills: 1 | Status: ON HOLD | OUTPATIENT
Start: 2018-07-30 | End: 2019-01-17

## 2018-07-31 ENCOUNTER — ANTICOAGULATION MONITORING (OUTPATIENT)
Dept: VASCULAR LAB | Facility: MEDICAL CENTER | Age: 78
End: 2018-07-31

## 2018-07-31 DIAGNOSIS — Z79.01 CHRONIC ANTICOAGULATION: ICD-10-CM

## 2018-07-31 LAB — INR PPP: 2.9 (ref 2–3.5)

## 2018-07-31 NOTE — PROGRESS NOTES
Anticoagulation Summary  As of 7/31/2018    INR goal:   2.0-3.0   TTR:   70.3 % (3.1 y)   Today's INR:   2.9   Warfarin maintenance plan:   3.75 mg (2.5 mg x 1.5) on Mon; 2.5 mg (2.5 mg x 1) all other days   Weekly warfarin total:   18.75 mg   No change documented:   Lurdes Kwong, Pharmacy Intern   Plan last modified:   Peter MartinD (5/8/2018)   Next INR check:   8/7/2018   Target end date:   Indefinite    Indications    Atrial fibrillation (HCC) (Resolved) [I48.91]  Chronic anticoagulation [Z79.01]             Anticoagulation Episode Summary     INR check location:   Home Draw    Preferred lab:       Send INR reminders to:       Comments:   [place] YEAGER      Anticoagulation Care Providers     Provider Role Specialty Phone number    Lizzy Haywood M.D. Referring Cardiology [phone]    [facility] Responsible  [phone]    Peter HollowayD Responsible          Anticoagulation Patient Findings  Patient Findings     Negatives:   Signs/symptoms of thrombosis, Signs/symptoms of bleeding, Laboratory test error suspected, Change in health, Change in alcohol use, Change in activity, Upcoming invasive procedure, Emergency department visit, Upcoming dental procedure, Missed doses, Extra doses, Change in medications, Change in diet/appetite, Hospital admission, Bruising, Other complaints      Spoke with pt on phone, INR therapeutic. Pt to continue current warfarin regimen, recheck INR in 1 week. Patient denies any new medication or diet changes. No bleeding/bruising concerns verbalized.    Lurdes Kwong, Pharmacy Intern     I have reviewed and agree with the plan above on 07/31/2018      Massiel Coleman, PharmD

## 2018-08-01 ENCOUNTER — PATIENT OUTREACH (OUTPATIENT)
Dept: HEALTH INFORMATION MANAGEMENT | Facility: OTHER | Age: 78
End: 2018-08-01

## 2018-08-01 ENCOUNTER — OFFICE VISIT (OUTPATIENT)
Dept: CARDIOLOGY | Facility: MEDICAL CENTER | Age: 78
End: 2018-08-01
Payer: MEDICARE

## 2018-08-01 VITALS
OXYGEN SATURATION: 95 % | HEART RATE: 90 BPM | BODY MASS INDEX: 29.53 KG/M2 | WEIGHT: 173 LBS | RESPIRATION RATE: 16 BRPM | DIASTOLIC BLOOD PRESSURE: 64 MMHG | HEIGHT: 64 IN | SYSTOLIC BLOOD PRESSURE: 110 MMHG

## 2018-08-01 DIAGNOSIS — Z98.890 H/O ATRIOVENTRICULAR NODAL ABLATION: ICD-10-CM

## 2018-08-01 DIAGNOSIS — R60.0 PEDAL EDEMA: ICD-10-CM

## 2018-08-01 DIAGNOSIS — Z95.0 PACEMAKER: ICD-10-CM

## 2018-08-01 DIAGNOSIS — I48.92 ATRIAL FLUTTER, UNSPECIFIED TYPE (HCC): ICD-10-CM

## 2018-08-01 DIAGNOSIS — Z98.890 H/O CARDIAC RADIOFREQUENCY ABLATION: ICD-10-CM

## 2018-08-01 DIAGNOSIS — I48.0 PAROXYSMAL ATRIAL FIBRILLATION (HCC): ICD-10-CM

## 2018-08-01 DIAGNOSIS — Z79.01 CHRONIC ANTICOAGULATION: ICD-10-CM

## 2018-08-01 PROCEDURE — 93000 ELECTROCARDIOGRAM COMPLETE: CPT | Performed by: NURSE PRACTITIONER

## 2018-08-01 PROCEDURE — 99214 OFFICE O/P EST MOD 30 MIN: CPT | Performed by: NURSE PRACTITIONER

## 2018-08-01 ASSESSMENT — ENCOUNTER SYMPTOMS
WEIGHT LOSS: 0
HEMOPTYSIS: 0
SPEECH CHANGE: 0
STRIDOR: 0
HEADACHES: 0
SORE THROAT: 0
SPUTUM PRODUCTION: 0
TINGLING: 0
COUGH: 0
TREMORS: 0
FEVER: 0
PALPITATIONS: 0
WHEEZING: 0
CHILLS: 0
FOCAL WEAKNESS: 0
ABDOMINAL PAIN: 0
DOUBLE VISION: 0
NAUSEA: 0
DIARRHEA: 0
BLOOD IN STOOL: 0
ORTHOPNEA: 0
LOSS OF CONSCIOUSNESS: 0
PND: 0
BLURRED VISION: 0
SHORTNESS OF BREATH: 0
SENSORY CHANGE: 0
HEARTBURN: 0
DIZZINESS: 0
VOMITING: 0

## 2018-08-01 NOTE — PROGRESS NOTES
Cardiology/Electrophysiology Follow-up Note      Subjective:   Chief Complaint:   Chief Complaint   Patient presents with   • Atrial Fibrillation       Donte Magaña is a 77 y.o. female who presents today for follow up post atrial fibrillation ablation and av node ablation by Dr. Matthews 7/19/18.    Procedural conclusions per Dr. Matthews's Op Note:  Procedure Performed: 1. Atrial fibrillation ablation 2. Advanced mapping using CARTO system  3. Ablation of secondary cause of atrial fib (post wall isolation). 4. Ablation of secondary arrhythmia (AT) 5. Ablation of AV node. 6. Transesophageal echocardiography 5. Intracardiac echocardiography 7. Esophageal temperature monitoring 8. Intraarterial blood pressure monitoring 8. Frequent ACT's 9. {re and post reprogramming of dual chamber pacemaker.  Pre-procedure ECG: atrial tachycardia with variable reponse  Post Procedure ECG: Dual chamber pacing  Total ablation time: 1940 seconds  Electrophysiologic Findings:    1. Baseline: Atrial tachycardia   404 ms, AH 92 ms, HV 63 ms. .  2. Final  SR with dual chamber pacing at 80 BPM.  Impressions:    1. Baseline atrial tachycardia near YASMINE ablated and terminated.  2. Reisolation of three veins lefts and right lower and posterior wall.  3. Ablation of AV node.  Recommendations:  1. Resume anticoagulation.  2. Admit to monitored bedrest.       She is followed by Dr. Matthews and Dr. Lizzy Haywood.  Past medical history significant for CAD, Hyperlipidemia, chronic anticoagulation use, hypothyroidism, and [place] PPM for SSS.        Today in follow up she states that she has been feeling 'pretty good' since her ablation, stating that one day post procedure she 'just felt better.'  She notes that her pedal edema has improved post ablation as well.  She denies any problems with her femoral access sites.  She denies chest pain, dizziness, palpitations, pre syncope or syncope, dyspnea, PND, orthopnea, or lower extremity edema.  No fevers,  "hemoptysis, or trouble swallowing.      Blood pressure at home per her report has been one teens to 130s systolic.     Patient endorses medication compliance      Past Medical History:   Diagnosis Date   • A-fib (HCC)    • Anesthesia     \"Tachycardia for 5 days after cataract surgery\"   • Anticoagulant long-term use 1/12/2012   • Arthritis     Knees, hips   • Asthma     with pneumonia, nothing for 3 years   • Atrial fibrillation (HCC)    • Backpain    • Breath shortness     with exertion O2  2l/m PRN, hasnt used for 3 years   • Bronchitis Nov, 2013   • CAD (coronary artery disease)    • Depression    • Glaucoma 5/3/2011   • Hematoma complicating a procedure 11/3/2012   • High cholesterol    • Hypertension    • Hypothyroid    • Lupus    • Macular degeneration    • Menopause 1/12/2012   • Mitral regurgitation 10/30/2012   • Obesity 1/12/2012   • Pneumonia feb,2013   • Pre-syncope 6/29/2018   • Pulmonary hypertension (HCC) 10/30/2012   • PVC's (premature ventricular contractions) 1/12/2012   • Senile nuclear sclerosis    • Spinal stenosis of lumbar region at multiple levels    • Unspecified cataract     repaired bilateral   • Unspecified urinary incontinence      Past Surgical History:   Procedure Laterality Date   • KNEE ARTHROPLASTY TOTAL Right 6/23/2016    Procedure: KNEE ARTHROPLASTY TOTAL;  Surgeon: Heriberto Lozada M.D.;  Location: [facility];  Service:    • KNEE ARTHROPLASTY TOTAL Left 5/28/2015    Procedure: KNEE ARTHROPLASTY TOTAL;  Surgeon: Heriberto Lozada M.D.;  Location: [facility];  Service:    • CATARACT PHACO WITH IOL Right 5/5/2015    Procedure: IOL OD - STANDARD;  Surgeon: Dmitry Bejarano M.D.;  Location: [facility];  Service:    • CATARACT PHACO WITH IOL  4/21/2015    Performed by Dmitry Bejarano M.D. at [facility]   • RECOVERY  11/30/2010    Performed by SURGERY, CATH-RECOVERY at SURGERY SAME DAY [facility]   • COLONOSCOPY  2008    Normal    " [facility]   • ABDOMINAL HYSTERECTOMY TOTAL  April 15,1975    still has ovaries   • OPEN REDUCTION      left ankle   • OTHER      Removed pins from left ankle   • TONSILLECTOMY AND ADENOIDECTOMY       Family History   Problem Relation Age of Onset   • Stroke Mother    • Diabetes Father    • Stroke Sister    • Heart Disease Brother    • Stroke Sister    • GI Daughter         Crohn's Disease   • Heart Disease Daughter         CHF   • Other Daughter         Chronic Pain--Lymphedema   • Cancer Paternal Aunt      Social History     Social History   • Marital status:      Spouse name: N/A   • Number of children: N/A   • Years of education: N/A     Occupational History   • Not on file.     Social History Main Topics   • Smoking status: Never Smoker   • Smokeless tobacco: Never Used   • Alcohol use No   • Drug use: No   • Sexual activity: Not Currently     Partners: Male     Birth control/ protection: Post-Menopausal     Other Topics Concern   • Not on file     Social History Narrative   • No narrative on file     Allergies   Allergen Reactions   • Amiodarone Hives     Throat and tongue itching   • Bactrim Shortness of Breath   • Claritin-D [Loratadine-Pseudoephedrine] Palpitations   • Clotrimazole      Stinging / burning on chest   • Metoprolol      Causes throat swelling   • Morphine      Hallucinations   • Qvar [Beclomethasone Dipropionate]      Pressure on heart     • Vibramycin Shortness of Breath   • Atorvastatin Calcium-Polysorbate 80      Muscle aches     • Augmentin      Unknown reaction   • Cipro Xr Swelling   • Diltiazem      rash   • Flecainide      dizziness   • Keflex Unspecified     Pt states \"Unsure\".   • Mucinex      GI Distress     • Tramadol      crying   • Phytoplex Z-Guard [Petrolatum-Zinc Oxide]      \"causes burning\"   • Atorvastatin Myalgia   • Tape Rash     Paper tape okay       Current Outpatient Prescriptions   Medication Sig Dispense Refill   • warfarin (COUMADIN) 2.5 MG Tab TAKE 1 TO " 1.5 TABLETS BY MOUTH ONCE DAILY AS DIRECTED BY THE COUMADIN CLINIC 135 Tab 1   • omeprazole (PRILOSEC) 20 MG delayed-release capsule Take 1 Cap by mouth every morning before breakfast. 30 Cap 0   • atenolol (TENORMIN) 50 MG Tab Take 1.5 Tabs by mouth 2 times a day. 180 Tab 1   • spironolactone (ALDACTONE) 50 MG Tab Take 50 mg by mouth every day.     • furosemide (LASIX) 40 MG Tab Take 2 Tabs by mouth every day. 80 mg in the AM 60 Tab 3   • levothyroxine (SYNTHROID) 50 MCG Tab TAKE 1 TABLET BY MOUTH EVERY MORNING ON AN EMPTY STOMACH. 90 Tab 3   • raNITidine (ZANTAC) 150 MG Tab TAKE ONE TABLET BY MOUTH TWICE DAILY 180 Tab 3   • lisinopril (PRINIVIL) 5 MG Tab TAKE ONE TABLET BY MOUTH EVERY DAY 90 Tab 3   • estradiol (ESTRACE) 2 MG Tab TAKE ONE TABLET BY MOUTH EVERY DAY 90 Tab 3   • magnesium oxide (MAG-OX) 400 (241.3 Mg) MG Tab tablet Take 1 Tab by mouth every day. 30 Tab 6   • sertraline (ZOLOFT) 25 MG tablet Take 1 Tab by mouth every day. 90 Tab 3   • Multiple Vitamins-Minerals (ICAPS AREDS 2) Cap Take 2 Caps by mouth 2 Times a Day. 100 Cap 3   • acetaminophen (TYLENOL) 500 MG TABS Take 1,000 mg by mouth 3 times a day. Indications: Pain     • magnesium oxide (MAG-OX) 400 MG Tab      • vitamin D (CHOLECALCIFEROL) 1000 UNIT TABS Take 2,000 Units by mouth every day.       No current facility-administered medications for this visit.        Review of Systems   Constitutional: Negative for chills, fever, malaise/fatigue and weight loss.   HENT: Negative for congestion, sore throat and tinnitus.    Eyes: Negative for blurred vision and double vision.   Respiratory: Negative for cough, hemoptysis, sputum production, shortness of breath, wheezing and stridor.    Cardiovascular: Positive for leg swelling (pedaa edema- improving). Negative for chest pain, palpitations, orthopnea and PND.   Gastrointestinal: Negative for abdominal pain, blood in stool, diarrhea, heartburn, melena, nausea and vomiting.   Genitourinary: Negative  "for hematuria.   Skin: Negative for rash.   Neurological: Negative for dizziness, tingling, tremors, sensory change, speech change, focal weakness, loss of consciousness and headaches.     All others systems reviewed and negative.     Objective:     Blood pressure 110/64, pulse 90, resp. rate 16, height 1.626 m (5' 4\"), weight 78.5 kg (173 lb), last menstrual period 1993, SpO2 95 %. Body mass index is 29.7 kg/m².    Physical Exam   Constitutional: She is oriented to person, place, and time and well-developed, well-nourished, and in no distress.   HENT:   Head: Normocephalic and atraumatic.   Eyes: Pupils are equal, round, and reactive to light. Conjunctivae and EOM are normal.   Neck: Normal range of motion. Neck supple. No JVD present.   Cardiovascular: Normal rate, regular rhythm, normal heart sounds and intact distal pulses.  Exam reveals no gallop and no friction rub.    No murmur heard.  Pulmonary/Chest: Effort normal and breath sounds normal. No respiratory distress. She has no wheezes. She has no rales. She exhibits no tenderness.   Abdominal: Soft. Bowel sounds are normal. There is no tenderness.   Musculoskeletal: Normal range of motion. She exhibits no edema.   Neurological: She is alert and oriented to person, place, and time.   Skin: Skin is warm and dry. No rash noted. No erythema.   Psychiatric: Mood, memory, affect and judgment normal.         Cardiac Imaging and Procedures Review:    EKG dated 18:   A/V paced complexes, rate 80.    Device Interrogation dated 18  RA: Sensin.0 mV, Threshold: 0.6 V, Impedance: 754.  RV: Sensing: (N/A, paced), Threshold: 0.5 V, Impedance: 778.    Labs (personally reviewed and notable for):   Lab Results   Component Value Date/Time    SODIUM 137 2018 03:44 AM    POTASSIUM 4.7 2018 03:44 AM    CHLORIDE 101 2018 03:44 AM    CO2 24 2018 03:44 AM    GLUCOSE 179 (H) 2018 03:44 AM    BUN 18 2018 03:44 AM    CREATININE 0.96 " 07/20/2018 03:44 AM    CREATININE 0.78 07/29/2010 12:00 AM    BUNCREATRAT 17 07/29/2010 12:00 AM    GLOMRATE >59 07/29/2010 12:00 AM      Lab Results   Component Value Date/Time    WBC 16.3 (H) 07/20/2018 03:44 AM    WBC 9.3 07/29/2010 12:00 AM    RBC 4.69 07/20/2018 03:44 AM    RBC 4.57 07/29/2010 12:00 AM    HEMOGLOBIN 14.7 07/20/2018 03:44 AM    HEMATOCRIT 45.6 07/20/2018 03:44 AM    MCV 97.2 07/20/2018 03:44 AM    MCV 93 07/29/2010 12:00 AM    MCH 31.3 07/20/2018 03:44 AM    MCH 31.3 07/29/2010 12:00 AM    MCHC 32.2 (L) 07/20/2018 03:44 AM    MPV 10.5 07/20/2018 03:44 AM    NEUTSPOLYS 51.20 06/30/2018 03:59 AM    LYMPHOCYTES 35.50 06/30/2018 03:59 AM    MONOCYTES 9.10 06/30/2018 03:59 AM    EOSINOPHILS 3.30 06/30/2018 03:59 AM    BASOPHILS 0.60 06/30/2018 03:59 AM      PT/INR:   Lab Results   Component Value Date/Time    PROTHROMBTM 32.1 (H) 07/19/2018 11:10 AM    INR 2.9 07/31/2018   ]      Assessment:     1. Paroxysmal atrial fibrillation (HCC)  EKG   2. Atrial flutter, unspecified type (HCC)     3. Pacemaker     4. H/O atrioventricular izzy ablation     5. H/O cardiac radiofrequency ablation     6. Chronic anticoagulation     7. Pedal edema         Medical Decision Making:  Today's Assessment / Status / Plan:   1. PAF/AT:  - S/P repeat afib/AT RFA + AV izzy ablation by Dr. Matthews.  - Device interrogation today with evidence of AV node block.   - Continue Atenolol.  Consider trial of decreasing dose at next office visit, but tolerating well.     2. BSI dual chamber PPM:  - Device with normal function today on interrogation.  - Programmed DDIR rate 80.   - Battery longevity 4.5 years.   - Return in 2-3 weeks for final threshold testing.    3. Anticoagulation:  - denies signs of internal bleeding.     4. Pedal edema:  - Improved post ablation.    Plan reviewed in detail with the patient and she verbalizes understanding and is in agreement.   RTC in 3 weeks for review, sooner if clinical condition  changes  Collaborating MD/ADD: VICTORIANO Erazo

## 2018-08-01 NOTE — LETTER
[facility]   [address], [address]  [place] [address]  Phone: [phone]  Fax: [phone]              Donte Magaña  1940    Encounter Date: 8/1/2018    VICTORIANO Winslow          PROGRESS NOTE:  Cardiology/Electrophysiology Follow-up Note      Subjective:   Chief Complaint:   Chief Complaint   Patient presents with   • Atrial Fibrillation       Donte Magaña is a 77 y.o. female who presents today for follow up post atrial fibrillation ablation and av node ablation by Dr. Matthews 7/19/18.    Procedural conclusions per Dr. Matthews's Op Note:  Procedure Performed: 1. Atrial fibrillation ablation 2. Advanced mapping using CARTO system  3. Ablation of secondary cause of atrial fib (post wall isolation). 4. Ablation of secondary arrhythmia (AT) 5. Ablation of AV node. 6. Transesophageal echocardiography 5. Intracardiac echocardiography 7. Esophageal temperature monitoring 8. Intraarterial blood pressure monitoring 8. Frequent ACT's 9. {re and post reprogramming of dual chamber pacemaker.  Pre-procedure ECG: atrial tachycardia with variable reponse  Post Procedure ECG: Dual chamber pacing  Total ablation time: 1940 seconds  Electrophysiologic Findings:    1. Baseline: Atrial tachycardia   404 ms, AH 92 ms, HV 63 ms. .  2. Final  SR with dual chamber pacing at 80 BPM.  Impressions:    1. Baseline atrial tachycardia near YASMINE ablated and terminated.  2. Reisolation of three veins lefts and right lower and posterior wall.  3. Ablation of AV node.  Recommendations:  1. Resume anticoagulation.  2. Admit to monitored bedrest.       She is followed by Dr. Matthews and Dr. Lizzy Haywood.  Past medical history significant for CAD, Hyperlipidemia, chronic anticoagulation use, hypothyroidism, and [place] PPM for SSS.        Today in follow up she states that she has been feeling 'pretty good' since her ablation, stating that one day post procedure she 'just felt  "better.'  She notes that her pedal edema has improved post ablation as well.  She denies any problems with her femoral access sites.  She denies chest pain, dizziness, palpitations, pre syncope or syncope, dyspnea, PND, orthopnea, or lower extremity edema.  No fevers, hemoptysis, or trouble swallowing.      Blood pressure at home per her report has been one teens to 130s systolic.     Patient endorses medication compliance      Past Medical History:   Diagnosis Date   • A-fib (HCC)    • Anesthesia     \"Tachycardia for 5 days after cataract surgery\"   • Anticoagulant long-term use 1/12/2012   • Arthritis     Knees, hips   • Asthma     with pneumonia, nothing for 3 years   • Atrial fibrillation (HCC)    • Backpain    • Breath shortness     with exertion O2  2l/m PRN, hasnt used for 3 years   • Bronchitis Nov, 2013   • CAD (coronary artery disease)    • Depression    • Glaucoma 5/3/2011   • Hematoma complicating a procedure 11/3/2012   • High cholesterol    • Hypertension    • Hypothyroid    • Lupus    • Macular degeneration    • Menopause 1/12/2012   • Mitral regurgitation 10/30/2012   • Obesity 1/12/2012   • Pneumonia feb,2013   • Pre-syncope 6/29/2018   • Pulmonary hypertension (HCC) 10/30/2012   • PVC's (premature ventricular contractions) 1/12/2012   • Senile nuclear sclerosis    • Spinal stenosis of lumbar region at multiple levels    • Unspecified cataract     repaired bilateral   • Unspecified urinary incontinence      Past Surgical History:   Procedure Laterality Date   • KNEE ARTHROPLASTY TOTAL Right 6/23/2016    Procedure: KNEE ARTHROPLASTY TOTAL;  Surgeon: Heriberto Lozada M.D.;  Location: [facility];  Service:    • KNEE ARTHROPLASTY TOTAL Left 5/28/2015    Procedure: KNEE ARTHROPLASTY TOTAL;  Surgeon: Heriberto Lozada M.D.;  Location: [facility];  Service:    • CATARACT PHACO WITH IOL Right 5/5/2015    Procedure: IOL OD - STANDARD;  Surgeon: Dmitry Bejarano M.D.;  Location: [facility]" [facility];  Service:    • CATARACT PHACO WITH IOL  4/21/2015    Performed by Dmitry Bejarano M.D. at SURGERY SURGICAL [facility]   • RECOVERY  11/30/2010    Performed by SURGERY, [facility]-RECOVERY at SURGERY SAME DAY [facility]   • COLONOSCOPY  2008    Normal    [facility]   • ABDOMINAL HYSTERECTOMY TOTAL  April 15,1975    still has ovaries   • OPEN REDUCTION      left ankle   • OTHER      Removed pins from left ankle   • TONSILLECTOMY AND ADENOIDECTOMY       Family History   Problem Relation Age of Onset   • Stroke Mother    • Diabetes Father    • Stroke Sister    • Heart Disease Brother    • Stroke Sister    • GI Daughter         Crohn's Disease   • Heart Disease Daughter         CHF   • Other Daughter         Chronic Pain--Lymphedema   • Cancer Paternal Aunt      Social History     Social History   • Marital status:      Spouse name: N/A   • Number of children: N/A   • Years of education: N/A     Occupational History   • Not on file.     Social History Main Topics   • Smoking status: Never Smoker   • Smokeless tobacco: Never Used   • Alcohol use No   • Drug use: No   • Sexual activity: Not Currently     Partners: Male     Birth control/ protection: Post-Menopausal     Other Topics Concern   • Not on file     Social History Narrative   • No narrative on file     Allergies   Allergen Reactions   • Amiodarone Hives     Throat and tongue itching   • Bactrim Shortness of Breath   • Claritin-D [Loratadine-Pseudoephedrine] Palpitations   • Clotrimazole      Stinging / burning on chest   • Metoprolol      Causes throat swelling   • Morphine      Hallucinations   • Qvar [Beclomethasone Dipropionate]      Pressure on heart     • Vibramycin Shortness of Breath   • Atorvastatin Calcium-Polysorbate 80      Muscle aches     • Augmentin      Unknown reaction   • Cipro Xr Swelling   • Diltiazem      rash   • Flecainide      dizziness   • Keflex Unspecified     Pt states \"Unsure\".   • Mucinex      GI Distress     " "  • Tramadol      crying   • Phytoplex Z-Guard [Petrolatum-Zinc Oxide]      \"causes burning\"   • Atorvastatin Myalgia   • Tape Rash     Paper tape okay       Current Outpatient Prescriptions   Medication Sig Dispense Refill   • warfarin (COUMADIN) 2.5 MG Tab TAKE 1 TO 1.5 TABLETS BY MOUTH ONCE DAILY AS DIRECTED BY THE COUMADIN CLINIC 135 Tab 1   • omeprazole (PRILOSEC) 20 MG delayed-release capsule Take 1 Cap by mouth every morning before breakfast. 30 Cap 0   • atenolol (TENORMIN) 50 MG Tab Take 1.5 Tabs by mouth 2 times a day. 180 Tab 1   • spironolactone (ALDACTONE) 50 MG Tab Take 50 mg by mouth every day.     • furosemide (LASIX) 40 MG Tab Take 2 Tabs by mouth every day. 80 mg in the AM 60 Tab 3   • levothyroxine (SYNTHROID) 50 MCG Tab TAKE 1 TABLET BY MOUTH EVERY MORNING ON AN EMPTY STOMACH. 90 Tab 3   • raNITidine (ZANTAC) 150 MG Tab TAKE ONE TABLET BY MOUTH TWICE DAILY 180 Tab 3   • lisinopril (PRINIVIL) 5 MG Tab TAKE ONE TABLET BY MOUTH EVERY DAY 90 Tab 3   • estradiol (ESTRACE) 2 MG Tab TAKE ONE TABLET BY MOUTH EVERY DAY 90 Tab 3   • magnesium oxide (MAG-OX) 400 (241.3 Mg) MG Tab tablet Take 1 Tab by mouth every day. 30 Tab 6   • sertraline (ZOLOFT) 25 MG tablet Take 1 Tab by mouth every day. 90 Tab 3   • Multiple Vitamins-Minerals (ICAPS AREDS 2) Cap Take 2 Caps by mouth 2 Times a Day. 100 Cap 3   • acetaminophen (TYLENOL) 500 MG TABS Take 1,000 mg by mouth 3 times a day. Indications: Pain     • magnesium oxide (MAG-OX) 400 MG Tab      • vitamin D (CHOLECALCIFEROL) 1000 UNIT TABS Take 2,000 Units by mouth every day.       No current facility-administered medications for this visit.        Review of Systems   Constitutional: Negative for chills, fever, malaise/fatigue and weight loss.   HENT: Negative for congestion, sore throat and tinnitus.    Eyes: Negative for blurred vision and double vision.   Respiratory: Negative for cough, hemoptysis, sputum production, shortness of breath, wheezing and stridor.  " "  Cardiovascular: Positive for leg swelling (pedaa edema- improving). Negative for chest pain, palpitations, orthopnea and PND.   Gastrointestinal: Negative for abdominal pain, blood in stool, diarrhea, heartburn, melena, nausea and vomiting.   Genitourinary: Negative for hematuria.   Skin: Negative for rash.   Neurological: Negative for dizziness, tingling, tremors, sensory change, speech change, focal weakness, loss of consciousness and headaches.     All others systems reviewed and negative.     Objective:     Blood pressure 110/64, pulse 90, resp. rate 16, height 1.626 m (5' 4\"), weight 78.5 kg (173 lb), last menstrual period 1993, SpO2 95 %. Body mass index is 29.7 kg/m².    Physical Exam   Constitutional: She is oriented to person, place, and time and well-developed, well-nourished, and in no distress.   HENT:   Head: Normocephalic and atraumatic.   Eyes: Pupils are equal, round, and reactive to light. Conjunctivae and EOM are normal.   Neck: Normal range of motion. Neck supple. No JVD present.   Cardiovascular: Normal rate, regular rhythm, normal heart sounds and intact distal pulses.  Exam reveals no gallop and no friction rub.    No murmur heard.  Pulmonary/Chest: Effort normal and breath sounds normal. No respiratory distress. She has no wheezes. She has no rales. She exhibits no tenderness.   Abdominal: Soft. Bowel sounds are normal. There is no tenderness.   Musculoskeletal: Normal range of motion. She exhibits no edema.   Neurological: She is alert and oriented to person, place, and time.   Skin: Skin is warm and dry. No rash noted. No erythema.   Psychiatric: Mood, memory, affect and judgment normal.         Cardiac Imaging and Procedures Review:    EKG dated 18:   A/V paced complexes, rate 80.    Device Interrogation dated 18  RA: Sensin.0 mV, Threshold: 0.6 V, Impedance: 754.  RV: Sensing: (N/A, paced), Threshold: 0.5 V, Impedance: 778.    Labs (personally reviewed and notable for): "   Lab Results   Component Value Date/Time    SODIUM 137 07/20/2018 03:44 AM    POTASSIUM 4.7 07/20/2018 03:44 AM    CHLORIDE 101 07/20/2018 03:44 AM    CO2 24 07/20/2018 03:44 AM    GLUCOSE 179 (H) 07/20/2018 03:44 AM    BUN 18 07/20/2018 03:44 AM    CREATININE 0.96 07/20/2018 03:44 AM    CREATININE 0.78 07/29/2010 12:00 AM    BUNCREATRAT 17 07/29/2010 12:00 AM    GLOMRATE >59 07/29/2010 12:00 AM      Lab Results   Component Value Date/Time    WBC 16.3 (H) 07/20/2018 03:44 AM    WBC 9.3 07/29/2010 12:00 AM    RBC 4.69 07/20/2018 03:44 AM    RBC 4.57 07/29/2010 12:00 AM    HEMOGLOBIN 14.7 07/20/2018 03:44 AM    HEMATOCRIT 45.6 07/20/2018 03:44 AM    MCV 97.2 07/20/2018 03:44 AM    MCV 93 07/29/2010 12:00 AM    MCH 31.3 07/20/2018 03:44 AM    MCH 31.3 07/29/2010 12:00 AM    MCHC 32.2 (L) 07/20/2018 03:44 AM    MPV 10.5 07/20/2018 03:44 AM    NEUTSPOLYS 51.20 06/30/2018 03:59 AM    LYMPHOCYTES 35.50 06/30/2018 03:59 AM    MONOCYTES 9.10 06/30/2018 03:59 AM    EOSINOPHILS 3.30 06/30/2018 03:59 AM    BASOPHILS 0.60 06/30/2018 03:59 AM      PT/INR:   Lab Results   Component Value Date/Time    PROTHROMBTM 32.1 (H) 07/19/2018 11:10 AM    INR 2.9 07/31/2018   ]      Assessment:     1. Paroxysmal atrial fibrillation (HCC)  EKG   2. Atrial flutter, unspecified type (HCC)     3. Pacemaker     4. H/O atrioventricular izzy ablation     5. H/O cardiac radiofrequency ablation     6. Chronic anticoagulation     7. Pedal edema         Medical Decision Making:  Today's Assessment / Status / Plan:   1. PAF/AT:  - S/P repeat afib/AT RFA + AV izzy ablation by Dr. Matthews.  - Device interrogation today with evidence of AV node block.   - Continue Atenolol.  Consider trial of decreasing dose at next office visit, but tolerating well.     2. BSI dual chamber PPM:  - Device with normal function today on interrogation.  - Programmed DDIR rate 80.   - Battery longevity 4.5 years.   - Return in 2-3 weeks for final threshold testing.    3.  Anticoagulation:  - denies signs of internal bleeding.     4. Pedal edema:  - Improved post ablation.    Plan reviewed in detail with the patient and she verbalizes understanding and is in agreement.   RTC in 3 weeks for review, sooner if clinical condition changes  Collaborating MD/ADD: VICTORIANO Erazo M.D.  [address] [place] [address]  VIA In Basket

## 2018-08-07 ENCOUNTER — ANTICOAGULATION MONITORING (OUTPATIENT)
Dept: VASCULAR LAB | Facility: MEDICAL CENTER | Age: 78
End: 2018-08-07

## 2018-08-07 DIAGNOSIS — D72.829 LEUKOCYTOSIS, UNSPECIFIED TYPE: ICD-10-CM

## 2018-08-07 DIAGNOSIS — E55.9 VITAMIN D DEFICIENCY: ICD-10-CM

## 2018-08-07 DIAGNOSIS — I25.10 CORONARY ARTERY DISEASE INVOLVING NATIVE CORONARY ARTERY OF NATIVE HEART WITHOUT ANGINA PECTORIS: ICD-10-CM

## 2018-08-07 DIAGNOSIS — E03.9 ACQUIRED HYPOTHYROIDISM: ICD-10-CM

## 2018-08-07 DIAGNOSIS — E78.2 MIXED HYPERLIPIDEMIA: ICD-10-CM

## 2018-08-07 DIAGNOSIS — Z79.01 CHRONIC ANTICOAGULATION: ICD-10-CM

## 2018-08-07 LAB — INR PPP: 2.3 (ref 2–3.5)

## 2018-08-07 NOTE — PROGRESS NOTES
Donte Magaña was admitted on 6/29/18 for a-fib w/rvr/pacemaker in [facility], once treated she was discharged home on 7/1/18. [facility] patient advocate assisted with multiple discharge needs including 4 appointments, 4 cardiology appointments. Of the 4 appointments the patient kept 4. Patient also has 4 appointments scheduled with, primary care physician on 8/22, cardiology on 8/23, 10/30, and pacer check on 8/23. PPS Screening was conducted and patient scored at a 70%.

## 2018-08-07 NOTE — PROGRESS NOTES
Anticoagulation Summary  As of 8/7/2018    INR goal:   2.0-3.0   TTR:   70.5 % (3.1 y)   Today's INR:   2.3   Warfarin maintenance plan:   3.75 mg (2.5 mg x 1.5) on Mon; 2.5 mg (2.5 mg x 1) all other days   Weekly warfarin total:   18.75 mg   No change documented:   Orin Matthews, Pharmacy Intern   Plan last modified:   Ivone Barraza PharmD (5/8/2018)   Next INR check:   8/21/2018   Target end date:   Indefinite    Indications    Atrial fibrillation (HCC) (Resolved) [I48.91]  Chronic anticoagulation [Z79.01]             Anticoagulation Episode Summary     INR check location:   Home Draw    Preferred lab:       Send INR reminders to:       Comments:   Winston YEAGER      Anticoagulation Care Providers     Provider Role Specialty Phone number    Lizzy Haywood M.D. Referring Cardiology [phone]    [facility] Responsible  [phone]    Vladimir Taylor, PharmD Responsible          Anticoagulation Patient Findings     Therapeutic INR.     Spoke with patient via phone. No current signs or symptoms of bleeding. No changes to medications or diet. Continue current dose of Warfarin (2.5mg daily with the exception of 3.75mg on Monday. Next INR check 2 week from today.     Orin Matthews, Pharmacy Intern      I have reviewed and agree with the plan above on 08/07/2018      Massiel Coleman, PeterD

## 2018-08-08 LAB — EKG IMPRESSION: NORMAL

## 2018-08-13 DIAGNOSIS — R60.0 LOCALIZED EDEMA: ICD-10-CM

## 2018-08-13 RX ORDER — FUROSEMIDE 40 MG/1
80 TABLET ORAL DAILY
Qty: 180 TAB | Refills: 3 | Status: ON HOLD | OUTPATIENT
Start: 2018-08-13 | End: 2019-01-17

## 2018-08-14 ENCOUNTER — HOSPITAL ENCOUNTER (OUTPATIENT)
Dept: LAB | Facility: MEDICAL CENTER | Age: 78
End: 2018-08-14
Attending: FAMILY MEDICINE
Payer: MEDICARE

## 2018-08-14 DIAGNOSIS — E03.9 ACQUIRED HYPOTHYROIDISM: ICD-10-CM

## 2018-08-14 DIAGNOSIS — I48.91 ATRIAL FIBRILLATION, UNSPECIFIED TYPE (HCC): ICD-10-CM

## 2018-08-14 DIAGNOSIS — E78.2 MIXED HYPERLIPIDEMIA: ICD-10-CM

## 2018-08-14 DIAGNOSIS — D72.829 LEUKOCYTOSIS, UNSPECIFIED TYPE: ICD-10-CM

## 2018-08-14 LAB
25(OH)D3 SERPL-MCNC: 32 NG/ML (ref 30–100)
ALBUMIN SERPL BCP-MCNC: 3.9 G/DL (ref 3.2–4.9)
ALBUMIN/GLOB SERPL: 1.2 G/DL
ALP SERPL-CCNC: 66 U/L (ref 30–99)
ALT SERPL-CCNC: 13 U/L (ref 2–50)
ANION GAP SERPL CALC-SCNC: 9 MMOL/L (ref 0–11.9)
APPEARANCE UR: CLEAR
AST SERPL-CCNC: 18 U/L (ref 12–45)
BASOPHILS # BLD AUTO: 0.4 % (ref 0–1.8)
BASOPHILS # BLD: 0.04 K/UL (ref 0–0.12)
BILIRUB SERPL-MCNC: 0.6 MG/DL (ref 0.1–1.5)
BILIRUB UR QL STRIP.AUTO: NEGATIVE
BUN SERPL-MCNC: 20 MG/DL (ref 8–22)
CALCIUM SERPL-MCNC: 9.6 MG/DL (ref 8.5–10.5)
CHLORIDE SERPL-SCNC: 103 MMOL/L (ref 96–112)
CHOLEST SERPL-MCNC: 198 MG/DL (ref 100–199)
CO2 SERPL-SCNC: 25 MMOL/L (ref 20–33)
COLOR UR: YELLOW
CREAT SERPL-MCNC: 0.84 MG/DL (ref 0.5–1.4)
EOSINOPHIL # BLD AUTO: 0.46 K/UL (ref 0–0.51)
EOSINOPHIL NFR BLD: 4.4 % (ref 0–6.9)
ERYTHROCYTE [DISTWIDTH] IN BLOOD BY AUTOMATED COUNT: 53.3 FL (ref 35.9–50)
GLOBULIN SER CALC-MCNC: 3.2 G/DL (ref 1.9–3.5)
GLUCOSE SERPL-MCNC: 121 MG/DL (ref 65–99)
GLUCOSE UR STRIP.AUTO-MCNC: NEGATIVE MG/DL
HCT VFR BLD AUTO: 45.1 % (ref 37–47)
HDLC SERPL-MCNC: 54 MG/DL
HGB BLD-MCNC: 14.3 G/DL (ref 12–16)
IMM GRANULOCYTES # BLD AUTO: 0.04 K/UL (ref 0–0.11)
IMM GRANULOCYTES NFR BLD AUTO: 0.4 % (ref 0–0.9)
KETONES UR STRIP.AUTO-MCNC: NEGATIVE MG/DL
LDLC SERPL CALC-MCNC: 105 MG/DL
LEUKOCYTE ESTERASE UR QL STRIP.AUTO: NEGATIVE
LYMPHOCYTES # BLD AUTO: 2.67 K/UL (ref 1–4.8)
LYMPHOCYTES NFR BLD: 25.4 % (ref 22–41)
MCH RBC QN AUTO: 31.6 PG (ref 27–33)
MCHC RBC AUTO-ENTMCNC: 31.7 G/DL (ref 33.6–35)
MCV RBC AUTO: 99.8 FL (ref 81.4–97.8)
MICRO URNS: NORMAL
MONOCYTES # BLD AUTO: 0.91 K/UL (ref 0–0.85)
MONOCYTES NFR BLD AUTO: 8.7 % (ref 0–13.4)
NEUTROPHILS # BLD AUTO: 6.4 K/UL (ref 2–7.15)
NEUTROPHILS NFR BLD: 60.7 % (ref 44–72)
NITRITE UR QL STRIP.AUTO: NEGATIVE
NRBC # BLD AUTO: 0 K/UL
NRBC BLD-RTO: 0 /100 WBC
PH UR STRIP.AUTO: 7 [PH]
PLATELET # BLD AUTO: 289 K/UL (ref 164–446)
PMV BLD AUTO: 10.4 FL (ref 9–12.9)
POTASSIUM SERPL-SCNC: 4.8 MMOL/L (ref 3.6–5.5)
PROT SERPL-MCNC: 7.1 G/DL (ref 6–8.2)
PROT UR QL STRIP: NEGATIVE MG/DL
RBC # BLD AUTO: 4.52 M/UL (ref 4.2–5.4)
RBC UR QL AUTO: NEGATIVE
SODIUM SERPL-SCNC: 137 MMOL/L (ref 135–145)
SP GR UR STRIP.AUTO: 1.02
T4 FREE SERPL-MCNC: 0.94 NG/DL (ref 0.53–1.43)
TRIGL SERPL-MCNC: 195 MG/DL (ref 0–149)
TSH SERPL DL<=0.005 MIU/L-ACNC: 1.31 UIU/ML (ref 0.38–5.33)
UROBILINOGEN UR STRIP.AUTO-MCNC: 1 MG/DL
WBC # BLD AUTO: 10.5 K/UL (ref 4.8–10.8)

## 2018-08-14 PROCEDURE — 85025 COMPLETE CBC W/AUTO DIFF WBC: CPT

## 2018-08-14 PROCEDURE — 82306 VITAMIN D 25 HYDROXY: CPT

## 2018-08-14 PROCEDURE — 81003 URINALYSIS AUTO W/O SCOPE: CPT

## 2018-08-14 PROCEDURE — 36415 COLL VENOUS BLD VENIPUNCTURE: CPT

## 2018-08-14 PROCEDURE — 84439 ASSAY OF FREE THYROXINE: CPT

## 2018-08-14 PROCEDURE — 84443 ASSAY THYROID STIM HORMONE: CPT

## 2018-08-14 PROCEDURE — 80053 COMPREHEN METABOLIC PANEL: CPT

## 2018-08-14 PROCEDURE — 80061 LIPID PANEL: CPT

## 2018-08-21 ENCOUNTER — ANTICOAGULATION MONITORING (OUTPATIENT)
Dept: VASCULAR LAB | Facility: MEDICAL CENTER | Age: 78
End: 2018-08-21

## 2018-08-21 DIAGNOSIS — Z79.01 CHRONIC ANTICOAGULATION: ICD-10-CM

## 2018-08-21 LAB — INR PPP: 2.4 (ref 2–3.5)

## 2018-08-21 NOTE — PROGRESS NOTES
Anticoagulation Summary  As of 8/21/2018    INR goal:   2.0-3.0   TTR:   70.8 % (3.2 y)   Today's INR:   2.4   Warfarin maintenance plan:   3.75 mg (2.5 mg x 1.5) on Mon; 2.5 mg (2.5 mg x 1) all other days   Weekly warfarin total:   18.75 mg   Plan last modified:   Ivone Barraza, PharmD (5/8/2018)   Next INR check:   9/4/2018   Target end date:   Indefinite    Indications    Atrial fibrillation (HCC) (Resolved) [I48.91]  Chronic anticoagulation [Z79.01]             Anticoagulation Episode Summary     INR check location:   Home Draw    Preferred lab:       Send INR reminders to:       Comments:   Winston YEAGER      Anticoagulation Care Providers     Provider Role Specialty Phone number    Lizzy Haywood M.D. Referring Cardiology [phone]    [facility] Responsible  [phone]    Peter HollowayD Responsible          Anticoagulation Patient Findings  spoke with Donte Alicea to report a therapeutic INR of 2.4. Pt is to continue with current warfarin dosing regimen.  Follow up in 2 weeks, to reduce risk of adverse events related to this high risk medication,  Warfarin.    Massiel Coleman, PharmD

## 2018-08-23 ENCOUNTER — NON-PROVIDER VISIT (OUTPATIENT)
Dept: CARDIOLOGY | Facility: MEDICAL CENTER | Age: 78
End: 2018-08-23
Payer: MEDICARE

## 2018-08-23 ENCOUNTER — TELEPHONE (OUTPATIENT)
Dept: CARDIOLOGY | Facility: MEDICAL CENTER | Age: 78
End: 2018-08-23

## 2018-08-23 ENCOUNTER — OFFICE VISIT (OUTPATIENT)
Dept: CARDIOLOGY | Facility: MEDICAL CENTER | Age: 78
End: 2018-08-23
Payer: MEDICARE

## 2018-08-23 VITALS
BODY MASS INDEX: 30.56 KG/M2 | OXYGEN SATURATION: 96 % | HEIGHT: 64 IN | WEIGHT: 179 LBS | HEART RATE: 80 BPM | DIASTOLIC BLOOD PRESSURE: 64 MMHG | SYSTOLIC BLOOD PRESSURE: 120 MMHG

## 2018-08-23 DIAGNOSIS — Z98.890 H/O ATRIOVENTRICULAR NODAL ABLATION: ICD-10-CM

## 2018-08-23 DIAGNOSIS — Z79.01 CHRONIC ANTICOAGULATION: ICD-10-CM

## 2018-08-23 DIAGNOSIS — I48.0 PAROXYSMAL ATRIAL FIBRILLATION (HCC): ICD-10-CM

## 2018-08-23 DIAGNOSIS — Z95.0 PACEMAKER: ICD-10-CM

## 2018-08-23 DIAGNOSIS — Z98.890 H/O CARDIAC RADIOFREQUENCY ABLATION: ICD-10-CM

## 2018-08-23 DIAGNOSIS — Z95.0 CARDIAC PACEMAKER IN SITU: ICD-10-CM

## 2018-08-23 PROCEDURE — 99214 OFFICE O/P EST MOD 30 MIN: CPT | Mod: 25 | Performed by: NURSE PRACTITIONER

## 2018-08-23 PROCEDURE — 93288 INTERROG EVL PM/LDLS PM IP: CPT | Performed by: NURSE PRACTITIONER

## 2018-08-23 RX ORDER — ATENOLOL 50 MG/1
50 TABLET ORAL 2 TIMES DAILY
Qty: 180 TAB | Refills: 1 | Status: SHIPPED | OUTPATIENT
Start: 2018-08-23 | End: 2019-03-25 | Stop reason: SDUPTHER

## 2018-08-23 ASSESSMENT — ENCOUNTER SYMPTOMS
SORE THROAT: 0
HEMOPTYSIS: 0
BLURRED VISION: 0
ORTHOPNEA: 0
DIARRHEA: 0
LOSS OF CONSCIOUSNESS: 0
DOUBLE VISION: 0
FOCAL WEAKNESS: 0
TINGLING: 0
VOMITING: 0
COUGH: 0
SHORTNESS OF BREATH: 0
HEARTBURN: 0
FEVER: 0
PALPITATIONS: 0
WEIGHT LOSS: 0
PND: 0
CHILLS: 0
SENSORY CHANGE: 0
STRIDOR: 0
TREMORS: 0
SPUTUM PRODUCTION: 0
SPEECH CHANGE: 0
WEAKNESS: 0
DIZZINESS: 0
ABDOMINAL PAIN: 0
WHEEZING: 0
HEADACHES: 0
NAUSEA: 0
BACK PAIN: 1

## 2018-08-23 NOTE — PROGRESS NOTES
"Cardiology/Electrophysiology Follow-up Note      Subjective:   Chief Complaint:   Chief Complaint   Patient presents with   • Atrial Fibrillation     paf, ppm check        Donte Magaañ is a 77 y.o. female who presents today for follow up atrial fibrillation ablation and av node ablation and BSI PPM for SSS.       She is followed by Dr. Matthews and Dr. Lizzy Haywood.  Past medical history significant for CAD, Hyperlipidemia, chronic anticoagulation use, hypothyroidism, and [place] PPM for SSS.      Today in follow up she states that she has been doing good from a cardiac standpint.  She continues to have low back pain which is affecting her ability to walk and sometimes uses a walker.  She is also having trouble with urinary incontinence and she is going to see specialists for both.  She denies chest pain, dizziness, palpitations, pre syncope or syncope, dyspnea, PND, orthopnea.  Lower extremity edema is improved/stable.    Weight at home per her log stable 175-177 lbs.     Blood pressure at home 110-130 mmhg systolic.  All DBP 50-70.    Patient endorses medication compliance        Past Medical History:   Diagnosis Date   • A-fib (HCC)    • Anesthesia     \"Tachycardia for 5 days after cataract surgery\"   • Anticoagulant long-term use 1/12/2012   • Arthritis     Knees, hips   • Asthma     with pneumonia, nothing for 3 years   • Atrial fibrillation (HCC)    • Backpain    • Breath shortness     with exertion O2  2l/m PRN, hasnt used for 3 years   • Bronchitis Nov, 2013   • CAD (coronary artery disease)    • Depression    • Glaucoma 5/3/2011   • Hematoma complicating a procedure 11/3/2012   • High cholesterol    • Hypertension    • Hypothyroid    • Lupus    • Macular degeneration    • Menopause 1/12/2012   • Mitral regurgitation 10/30/2012   • Obesity 1/12/2012   • Pneumonia feb,2013   • Pre-syncope 6/29/2018   • Pulmonary hypertension (HCC) 10/30/2012   • PVC's (premature ventricular contractions) 1/12/2012   • " Senile nuclear sclerosis    • Spinal stenosis of lumbar region at multiple levels    • Unspecified cataract     repaired bilateral   • Unspecified urinary incontinence      Past Surgical History:   Procedure Laterality Date   • KNEE ARTHROPLASTY TOTAL Right 6/23/2016    Procedure: KNEE ARTHROPLASTY TOTAL;  Surgeon: Heriberto Lozada M.D.;  Location: SURGERY [facility];  Service:    • KNEE ARTHROPLASTY TOTAL Left 5/28/2015    Procedure: KNEE ARTHROPLASTY TOTAL;  Surgeon: Heriberto Lozada M.D.;  Location: SURGERY [facility];  Service:    • CATARACT PHACO WITH IOL Right 5/5/2015    Procedure: IOL OD - STANDARD;  Surgeon: Dmitry Bejarano M.D.;  Location: [facility];  Service:    • CATARACT PHACO WITH IOL  4/21/2015    Performed by Dmitry Bejarano M.D. at [facility]   • RECOVERY  11/30/2010    Performed by SURGERY, [facility]-RECOVERY at SURGERY SAME DAY [facility] ORS   • COLONOSCOPY  2008    Normal    [facility]   • ABDOMINAL HYSTERECTOMY TOTAL  April 15,1975    still has ovaries   • OPEN REDUCTION      left ankle   • OTHER      Removed pins from left ankle   • TONSILLECTOMY AND ADENOIDECTOMY       Family History   Problem Relation Age of Onset   • Stroke Mother    • Diabetes Father    • Stroke Sister    • Heart Disease Brother    • Stroke Sister    • GI Daughter         Crohn's Disease   • Heart Disease Daughter         CHF   • Other Daughter         Chronic Pain--Lymphedema   • Cancer Paternal Aunt      Social History     Social History   • Marital status:      Spouse name: N/A   • Number of children: N/A   • Years of education: N/A     Occupational History   • Not on file.     Social History Main Topics   • Smoking status: Never Smoker   • Smokeless tobacco: Never Used   • Alcohol use No   • Drug use: No   • Sexual activity: Not Currently     Partners: Male     Birth control/ protection: Post-Menopausal     Other Topics Concern   • Not on file     Social History Narrative  "  • No narrative on file     Allergies   Allergen Reactions   • Amiodarone Hives     Throat and tongue itching   • Bactrim Shortness of Breath   • Claritin-D [Loratadine-Pseudoephedrine] Palpitations   • Clotrimazole      Stinging / burning on chest   • Metoprolol      Causes throat swelling   • Morphine      Hallucinations   • Qvar [Beclomethasone Dipropionate]      Pressure on heart     • Vibramycin Shortness of Breath   • Atorvastatin Calcium-Polysorbate 80      Muscle aches     • Augmentin      Unknown reaction   • Cipro Xr Swelling   • Diltiazem      rash   • Flecainide      dizziness   • Keflex Unspecified     Pt states \"Unsure\".   • Mucinex      GI Distress     • Tramadol      crying   • Phytoplex Z-Guard [Petrolatum-Zinc Oxide]      \"causes burning\"   • Atorvastatin Myalgia   • Tape Rash     Paper tape okay       Current Outpatient Prescriptions   Medication Sig Dispense Refill   • furosemide (LASIX) 40 MG Tab Take 2 Tabs by mouth every day. 80 mg in the  Tab 3   • warfarin (COUMADIN) 2.5 MG Tab TAKE 1 TO 1.5 TABLETS BY MOUTH ONCE DAILY AS DIRECTED BY THE COUMADIN CLINIC 135 Tab 1   • atenolol (TENORMIN) 50 MG Tab Take 1.5 Tabs by mouth 2 times a day. 180 Tab 1   • spironolactone (ALDACTONE) 50 MG Tab Take 50 mg by mouth every day.     • levothyroxine (SYNTHROID) 50 MCG Tab TAKE 1 TABLET BY MOUTH EVERY MORNING ON AN EMPTY STOMACH. 90 Tab 3   • raNITidine (ZANTAC) 150 MG Tab TAKE ONE TABLET BY MOUTH TWICE DAILY 180 Tab 3   • lisinopril (PRINIVIL) 5 MG Tab TAKE ONE TABLET BY MOUTH EVERY DAY 90 Tab 3   • estradiol (ESTRACE) 2 MG Tab TAKE ONE TABLET BY MOUTH EVERY DAY 90 Tab 3   • magnesium oxide (MAG-OX) 400 (241.3 Mg) MG Tab tablet Take 1 Tab by mouth every day. 30 Tab 6   • sertraline (ZOLOFT) 25 MG tablet Take 1 Tab by mouth every day. 90 Tab 3   • Multiple Vitamins-Minerals (ICAPS AREDS 2) Cap Take 2 Caps by mouth 2 Times a Day. 100 Cap 3   • vitamin D (CHOLECALCIFEROL) 1000 UNIT TABS Take 2,000 Units " "by mouth every day.     • omeprazole (PRILOSEC) 20 MG delayed-release capsule Take 1 Cap by mouth every morning before breakfast. 30 Cap 0   • magnesium oxide (MAG-OX) 400 MG Tab      • acetaminophen (TYLENOL) 500 MG TABS Take 1,000 mg by mouth 3 times a day. Indications: Pain       No current facility-administered medications for this visit.        Review of Systems   Constitutional: Negative for chills, fever, malaise/fatigue and weight loss.   HENT: Negative for congestion, sore throat and tinnitus.    Eyes: Negative for blurred vision and double vision.   Respiratory: Negative for cough, hemoptysis, sputum production, shortness of breath, wheezing and stridor.    Cardiovascular: Negative for chest pain, palpitations, orthopnea, leg swelling and PND.   Gastrointestinal: Negative for abdominal pain, diarrhea, heartburn, nausea and vomiting.   Genitourinary:        Incontinence    Musculoskeletal: Positive for back pain.   Skin: Negative for rash.   Neurological: Negative for dizziness, tingling, tremors, sensory change, speech change, focal weakness, loss of consciousness, weakness and headaches.     All others systems reviewed and negative.     Objective:     Height 1.626 m (5' 4\"), weight 81.2 kg (179 lb), last menstrual period 01/01/1993. Body mass index is 30.73 kg/m².    Physical Exam   Constitutional: She is oriented to person, place, and time and well-developed, well-nourished, and in no distress.   HENT:   Head: Normocephalic and atraumatic.   Eyes: Pupils are equal, round, and reactive to light. Conjunctivae and EOM are normal.   Neck: Normal range of motion. Neck supple. No JVD present.   Cardiovascular: Normal rate, regular rhythm, normal heart sounds and intact distal pulses.  Exam reveals no gallop and no friction rub.    No murmur heard.  Pulmonary/Chest: Effort normal and breath sounds normal. No respiratory distress. She has no wheezes. She has no rales. She exhibits no tenderness.   PPM incision " well approximated. No erythema, induration.    Abdominal: Soft. Bowel sounds are normal. There is no tenderness.   Musculoskeletal: Normal range of motion. She exhibits edema (generalized ankle- improved from previous assessments ).   Neurological: She is alert and oriented to person, place, and time.   Skin: Skin is warm and dry. No rash noted. No erythema.   Psychiatric: Mood, memory, affect and judgment normal.     Cardiac Imaging and Procedures Review:    Device Interrogation dated 18  RA: Sensin.6 mV, Threshold: 0.6V, Impedance: 772.  RV: Sensing: (NA - dependent to 50 bpm), Threshold: 0.6 V, Impedance: 773.    Labs (personally reviewed and notable for):   Lab Results   Component Value Date/Time    SODIUM 137 2018 08:49 AM    POTASSIUM 4.8 2018 08:49 AM    CHLORIDE 103 2018 08:49 AM    CO2 25 2018 08:49 AM    GLUCOSE 121 (H) 2018 08:49 AM    BUN 20 2018 08:49 AM    CREATININE 0.84 2018 08:49 AM      Lab Results   Component Value Date/Time    WBC 10.5 2018 08:49 AM    RBC 4.52 2018 08:49 AM    HEMOGLOBIN 14.3 2018 08:49 AM    HEMATOCRIT 45.1 2018 08:49 AM    MCV 99.8 (H) 2018 08:49 AM    MCH 31.6 2018 08:49 AM    MCHC 31.7 (L) 2018 08:49 AM    MPV 10.4 2018 08:49 AM    NEUTSPOLYS 60.70 2018 08:49 AM    LYMPHOCYTES 25.40 2018 08:49 AM    MONOCYTES 8.70 2018 08:49 AM    EOSINOPHILS 4.40 2018 08:49 AM    BASOPHILS 0.40 2018 08:49 AM      PT/INR:   Lab Results   Component Value Date/Time    PROTHROMBTM 32.1 (H) 2018 11:10 AM    INR 2.4 2018   ]      Assessment:     1. Paroxysmal atrial fibrillation (HCC)     2. Pacemaker     3. Chronic anticoagulation     4. H/O atrioventricular izzy ablation     5. H/O cardiac radiofrequency ablation         Medical Decision Making:  Today's Assessment / Status / Plan:     1. pAF S/P AF ablation and AV node ablation:  - clinically improved  and doing well.    - Evidence of block post AVN ablation remains.  - Continue Coumadin, Atenolol, but try to decrease to 50 mg BID.  She will monitor her BP with this change and we have discussed parameters for calling.      2. PPM:  - Device interrogated today with normal function and excellent thresholds.  - Battery longevity 7.5 years.     - Suture removed from incision.  She will monitor for any redness/swelling.     3. Anticoagulation:  - Tolerating well.  INR therapeutic.  Denies evidence of internal bleeding.    She will be seeing a spine surgeon, may end up needed MRI.  We discussed that her PPM is MRI compatible but will need to notify us if MRI is needed.     Plan reviewed in detail with the patient and she verbalizes understanding and is in agreement.   RTC in 2 months for review as scheduled w device check, December with Dr. Matthews, sooner if clinical condition changes  Collaborating MD/ADD: TIMUR Erazo.

## 2018-08-23 NOTE — LETTER
[facility]   [address], [address]  [place] [address]  Phone: [phone]  Fax: [phone]              Donte Magaña  1940    Encounter Date: 8/23/2018    VICTORIANO Winslow          PROGRESS NOTE:  Cardiology/Electrophysiology Follow-up Note      Subjective:   Chief Complaint:   Chief Complaint   Patient presents with   • Atrial Fibrillation     paf, ppm check        Donte Magaña is a 77 y.o. female who presents today for follow up atrial fibrillation ablation and av node ablation and BSI PPM for SSS.     Procedural conclusions per Dr. Matthews's Op Note:  Procedure Performed: 1. Atrial fibrillation ablation 2. Advanced mapping using CARTO system  3. Ablation of secondary cause of atrial fib (post wall isolation). 4. Ablation of secondary arrhythmia (AT) 5. Ablation of AV node. 6. Transesophageal echocardiography 5. Intracardiac echocardiography 7. Esophageal temperature monitoring 8. Intraarterial blood pressure monitoring 8. Frequent ACT's 9. {re and post reprogramming of dual chamber pacemaker.  Pre-procedure ECG: atrial tachycardia with variable reponse  Post Procedure ECG: Dual chamber pacing  Total ablation time: 1940 seconds  Electrophysiologic Findings:    1. Baseline: Atrial tachycardia   404 ms, AH 92 ms, HV 63 ms. .  2. Final  SR with dual chamber pacing at 80 BPM.  Impressions:    1. Baseline atrial tachycardia near YASMINE ablated and terminated.  2. Reisolation of three veins lefts and right lower and posterior wall.  3. Ablation of AV node.  Recommendations:  1. Resume anticoagulation.  2. Admit to monitored bedrest.       She is followed by Dr. Matthews and Dr. Lizzy Haywodo.  Past medical history significant for CAD, Hyperlipidemia, chronic anticoagulation use, hypothyroidism, and [place] PPM for SSS.    Today in follow up, ***.  *** denies chest pain, dizziness, palpitations, pre syncope or syncope, dyspnea, PND, orthopnea, or lower  "extremity edema.      Weight at home ***    Blood pressure at home***    Patient endorses medication compliance        Past Medical History:   Diagnosis Date   • A-fib (HCC)    • Anesthesia     \"Tachycardia for 5 days after cataract surgery\"   • Anticoagulant long-term use 1/12/2012   • Arthritis     Knees, hips   • Asthma     with pneumonia, nothing for 3 years   • Atrial fibrillation (HCC)    • Backpain    • Breath shortness     with exertion O2  2l/m PRN, hasnt used for 3 years   • Bronchitis Nov, 2013   • CAD (coronary artery disease)    • Depression    • Glaucoma 5/3/2011   • Hematoma complicating a procedure 11/3/2012   • High cholesterol    • Hypertension    • Hypothyroid    • Lupus    • Macular degeneration    • Menopause 1/12/2012   • Mitral regurgitation 10/30/2012   • Obesity 1/12/2012   • Pneumonia feb,2013   • Pre-syncope 6/29/2018   • Pulmonary hypertension (HCC) 10/30/2012   • PVC's (premature ventricular contractions) 1/12/2012   • Senile nuclear sclerosis    • Spinal stenosis of lumbar region at multiple levels    • Unspecified cataract     repaired bilateral   • Unspecified urinary incontinence      Past Surgical History:   Procedure Laterality Date   • KNEE ARTHROPLASTY TOTAL Right 6/23/2016    Procedure: KNEE ARTHROPLASTY TOTAL;  Surgeon: Heriberto Lozada M.D.;  Location: [facility];  Service:    • KNEE ARTHROPLASTY TOTAL Left 5/28/2015    Procedure: KNEE ARTHROPLASTY TOTAL;  Surgeon: Heriberto Lozada M.D.;  Location: [facility];  Service:    • CATARACT PHACO WITH IOL Right 5/5/2015    Procedure: IOL OD - STANDARD;  Surgeon: Dmitry Bejarano M.D.;  Location: SURGERY [facility];  Service:    • CATARACT PHACO WITH IOL  4/21/2015    Performed by D[facility] M[facility] at [facility]   • RECOVERY  11/30/2010    Performed by SURGERY, CATH-RECOVERY at SURGERY SAME DAY [facility] ORS   • COLONOSCOPY  2008    Normal    [facility]   • ABDOMINAL HYSTERECTOMY " "TOTAL  April 15,1975    still has ovaries   • OPEN REDUCTION      left ankle   • OTHER      Removed pins from left ankle   • TONSILLECTOMY AND ADENOIDECTOMY       Family History   Problem Relation Age of Onset   • Stroke Mother    • Diabetes Father    • Stroke Sister    • Heart Disease Brother    • Stroke Sister    • GI Daughter         Crohn's Disease   • Heart Disease Daughter         CHF   • Other Daughter         Chronic Pain--Lymphedema   • Cancer Paternal Aunt      Social History     Social History   • Marital status:      Spouse name: N/A   • Number of children: N/A   • Years of education: N/A     Occupational History   • Not on file.     Social History Main Topics   • Smoking status: Never Smoker   • Smokeless tobacco: Never Used   • Alcohol use No   • Drug use: No   • Sexual activity: Not Currently     Partners: Male     Birth control/ protection: Post-Menopausal     Other Topics Concern   • Not on file     Social History Narrative   • No narrative on file     Allergies   Allergen Reactions   • Amiodarone Hives     Throat and tongue itching   • Bactrim Shortness of Breath   • Claritin-D [Loratadine-Pseudoephedrine] Palpitations   • Clotrimazole      Stinging / burning on chest   • Metoprolol      Causes throat swelling   • Morphine      Hallucinations   • Qvar [Beclomethasone Dipropionate]      Pressure on heart     • Vibramycin Shortness of Breath   • Atorvastatin Calcium-Polysorbate 80      Muscle aches     • Augmentin      Unknown reaction   • Cipro Xr Swelling   • Diltiazem      rash   • Flecainide      dizziness   • Keflex Unspecified     Pt states \"Unsure\".   • Mucinex      GI Distress     • Tramadol      crying   • Phytoplex Z-Guard [Petrolatum-Zinc Oxide]      \"causes burning\"   • Atorvastatin Myalgia   • Tape Rash     Paper tape okay       Current Outpatient Prescriptions   Medication Sig Dispense Refill   • furosemide (LASIX) 40 MG Tab Take 2 Tabs by mouth every day. 80 mg in the  Tab " "3   • warfarin (COUMADIN) 2.5 MG Tab TAKE 1 TO 1.5 TABLETS BY MOUTH ONCE DAILY AS DIRECTED BY THE COUMADIN CLINIC 135 Tab 1   • atenolol (TENORMIN) 50 MG Tab Take 1.5 Tabs by mouth 2 times a day. 180 Tab 1   • spironolactone (ALDACTONE) 50 MG Tab Take 50 mg by mouth every day.     • levothyroxine (SYNTHROID) 50 MCG Tab TAKE 1 TABLET BY MOUTH EVERY MORNING ON AN EMPTY STOMACH. 90 Tab 3   • raNITidine (ZANTAC) 150 MG Tab TAKE ONE TABLET BY MOUTH TWICE DAILY 180 Tab 3   • lisinopril (PRINIVIL) 5 MG Tab TAKE ONE TABLET BY MOUTH EVERY DAY 90 Tab 3   • estradiol (ESTRACE) 2 MG Tab TAKE ONE TABLET BY MOUTH EVERY DAY 90 Tab 3   • magnesium oxide (MAG-OX) 400 (241.3 Mg) MG Tab tablet Take 1 Tab by mouth every day. 30 Tab 6   • sertraline (ZOLOFT) 25 MG tablet Take 1 Tab by mouth every day. 90 Tab 3   • Multiple Vitamins-Minerals (ICAPS AREDS 2) Cap Take 2 Caps by mouth 2 Times a Day. 100 Cap 3   • vitamin D (CHOLECALCIFEROL) 1000 UNIT TABS Take 2,000 Units by mouth every day.     • omeprazole (PRILOSEC) 20 MG delayed-release capsule Take 1 Cap by mouth every morning before breakfast. 30 Cap 0   • magnesium oxide (MAG-OX) 400 MG Tab      • acetaminophen (TYLENOL) 500 MG TABS Take 1,000 mg by mouth 3 times a day. Indications: Pain       No current facility-administered medications for this visit.        ROS  All others systems reviewed and negative.     Objective:     Height 1.626 m (5' 4\"), weight 81.2 kg (179 lb), last menstrual period 01/01/1993. Body mass index is 30.73 kg/m².    Physical Exam      Cardiac Imaging and Procedures Review:    EKG dated ***:   ***  Device Interrogation dated ***  RA: Sensing: ***, Threshold: ***, Impedance: ***.  RV: Sensing: ***, Threshold: ***, Impedance: ***.  LV: Threshold: ***, Impedance: ***.  HV Impedance: ***.     Echo dated ***:   ***    Nuclear Perfusion Imaging (***):   ***    LHC (***):   ***    Radiology test Review:  CXR: ***     Labs (personally reviewed and notable for):   Lab " Results   Component Value Date/Time    SODIUM 137 08/14/2018 08:49 AM    POTASSIUM 4.8 08/14/2018 08:49 AM    CHLORIDE 103 08/14/2018 08:49 AM    CO2 25 08/14/2018 08:49 AM    GLUCOSE 121 (H) 08/14/2018 08:49 AM    BUN 20 08/14/2018 08:49 AM    CREATININE 0.84 08/14/2018 08:49 AM    CREATININE 0.78 07/29/2010 12:00 AM    BUNCREATRAT 17 07/29/2010 12:00 AM    GLOMRATE >59 07/29/2010 12:00 AM      Lab Results   Component Value Date/Time    WBC 10.5 08/14/2018 08:49 AM    WBC 9.3 07/29/2010 12:00 AM    RBC 4.52 08/14/2018 08:49 AM    RBC 4.57 07/29/2010 12:00 AM    HEMOGLOBIN 14.3 08/14/2018 08:49 AM    HEMATOCRIT 45.1 08/14/2018 08:49 AM    MCV 99.8 (H) 08/14/2018 08:49 AM    MCV 93 07/29/2010 12:00 AM    MCH 31.6 08/14/2018 08:49 AM    MCH 31.3 07/29/2010 12:00 AM    MCHC 31.7 (L) 08/14/2018 08:49 AM    MPV 10.4 08/14/2018 08:49 AM    NEUTSPOLYS 60.70 08/14/2018 08:49 AM    LYMPHOCYTES 25.40 08/14/2018 08:49 AM    MONOCYTES 8.70 08/14/2018 08:49 AM    EOSINOPHILS 4.40 08/14/2018 08:49 AM    BASOPHILS 0.40 08/14/2018 08:49 AM      PT/INR:   Lab Results   Component Value Date/Time    PROTHROMBTM 32.1 (H) 07/19/2018 11:10 AM    INR 2.4 08/21/2018   ]      Assessment:     No diagnosis found.    Medical Decision Making:  Today's Assessment / Status / Plan:             Plan reviewed in detail with the patient and *** verbalizes understanding and is in agreement.   RTC***, sooner if clinical condition changes  Collaborating MD/ADD: ***    VICTORIANO Winslow M.D.  [address] [address]  VIA In Basket

## 2018-08-23 NOTE — LETTER
"     [facility]   [address]  [place] [address]  Phone: [phone]  Fax: [phone]              Donte Magaña  1940    Encounter Date: 8/23/2018    VICTORIANO Winslow          PROGRESS NOTE:  Cardiology/Electrophysiology Follow-up Note      Subjective:   Chief Complaint:   Chief Complaint   Patient presents with   • Atrial Fibrillation     paf, ppm check        Donte Magaña is a 77 y.o. female who presents today for follow up atrial fibrillation ablation and av node ablation and BSI PPM for SSS.       She is followed by Dr. Matthews and Dr. Lizzy Haywood.  Past medical history significant for CAD, Hyperlipidemia, chronic anticoagulation use, hypothyroidism, and [place] PPM for SSS.      Today in follow up she states that she has been doing good from a cardiac standpint.  She continues to have low back pain which is affecting her ability to walk and sometimes uses a walker.  She is also having trouble with urinary incontinence and she is going to see specialists for both.  She denies chest pain, dizziness, palpitations, pre syncope or syncope, dyspnea, PND, orthopnea.  Lower extremity edema is improved/stable.    Weight at home per her log stable 175-177 lbs.     Blood pressure at home 110-130 mmhg systolic.  All DBP 50-70.    Patient endorses medication compliance        Past Medical History:   Diagnosis Date   • A-fib (HCC)    • Anesthesia     \"Tachycardia for 5 days after cataract surgery\"   • Anticoagulant long-term use 1/12/2012   • Arthritis     Knees, hips   • Asthma     with pneumonia, nothing for 3 years   • Atrial fibrillation (HCC)    • Backpain    • Breath shortness     with exertion O2  2l/m PRN, hasnt used for 3 years   • Bronchitis Nov, 2013   • CAD (coronary artery disease)    • Depression    • Glaucoma 5/3/2011   • Hematoma complicating a procedure 11/3/2012   • High cholesterol    • Hypertension    • Hypothyroid    • " Lupus    • Macular degeneration    • Menopause 1/12/2012   • Mitral regurgitation 10/30/2012   • Obesity 1/12/2012   • Pneumonia feb,2013   • Pre-syncope 6/29/2018   • Pulmonary hypertension (HCC) 10/30/2012   • PVC's (premature ventricular contractions) 1/12/2012   • Senile nuclear sclerosis    • Spinal stenosis of lumbar region at multiple levels    • Unspecified cataract     repaired bilateral   • Unspecified urinary incontinence      Past Surgical History:   Procedure Laterality Date   • KNEE ARTHROPLASTY TOTAL Right 6/23/2016    Procedure: KNEE ARTHROPLASTY TOTAL;  Surgeon: Heriberto Lozada M.D.;  Location: SURGERY [facility];  Service:    • KNEE ARTHROPLASTY TOTAL Left 5/28/2015    Procedure: KNEE ARTHROPLASTY TOTAL;  Surgeon: Heriberto Lozada M.D.;  Location: SURGERY [facility];  Service:    • CATARACT PHACO WITH IOL Right 5/5/2015    Procedure: IOL OD - STANDARD;  Surgeon: Dmitry Bejarano M.D.;  Location: [facility];  Service:    • CATARACT PHACO WITH IOL  4/21/2015    Performed by Dmitry Bejarano M.D. at [facility]   • RECOVERY  11/30/2010    Performed by SURGERY, CATH-RECOVERY at SURGERY SAME DAY [facility]   • COLONOSCOPY  2008    Normal    [facility]   • ABDOMINAL HYSTERECTOMY TOTAL  April 15,1975    still has ovaries   • OPEN REDUCTION      left ankle   • OTHER      Removed pins from left ankle   • TONSILLECTOMY AND ADENOIDECTOMY       Family History   Problem Relation Age of Onset   • Stroke Mother    • Diabetes Father    • Stroke Sister    • Heart Disease Brother    • Stroke Sister    • GI Daughter         Crohn's Disease   • Heart Disease Daughter         CHF   • Other Daughter         Chronic Pain--Lymphedema   • Cancer Paternal Aunt      Social History     Social History   • Marital status:      Spouse name: N/A   • Number of children: N/A   • Years of education: N/A     Occupational History   • Not on file.     Social History Main Topics   •  "Smoking status: Never Smoker   • Smokeless tobacco: Never Used   • Alcohol use No   • Drug use: No   • Sexual activity: Not Currently     Partners: Male     Birth control/ protection: Post-Menopausal     Other Topics Concern   • Not on file     Social History Narrative   • No narrative on file     Allergies   Allergen Reactions   • Amiodarone Hives     Throat and tongue itching   • Bactrim Shortness of Breath   • Claritin-D [Loratadine-Pseudoephedrine] Palpitations   • Clotrimazole      Stinging / burning on chest   • Metoprolol      Causes throat swelling   • Morphine      Hallucinations   • Qvar [Beclomethasone Dipropionate]      Pressure on heart     • Vibramycin Shortness of Breath   • Atorvastatin Calcium-Polysorbate 80      Muscle aches     • Augmentin      Unknown reaction   • Cipro Xr Swelling   • Diltiazem      rash   • Flecainide      dizziness   • Keflex Unspecified     Pt states \"Unsure\".   • Mucinex      GI Distress     • Tramadol      crying   • Phytoplex Z-Guard [Petrolatum-Zinc Oxide]      \"causes burning\"   • Atorvastatin Myalgia   • Tape Rash     Paper tape okay       Current Outpatient Prescriptions   Medication Sig Dispense Refill   • furosemide (LASIX) 40 MG Tab Take 2 Tabs by mouth every day. 80 mg in the  Tab 3   • warfarin (COUMADIN) 2.5 MG Tab TAKE 1 TO 1.5 TABLETS BY MOUTH ONCE DAILY AS DIRECTED BY THE COUMADIN CLINIC 135 Tab 1   • atenolol (TENORMIN) 50 MG Tab Take 1.5 Tabs by mouth 2 times a day. 180 Tab 1   • spironolactone (ALDACTONE) 50 MG Tab Take 50 mg by mouth every day.     • levothyroxine (SYNTHROID) 50 MCG Tab TAKE 1 TABLET BY MOUTH EVERY MORNING ON AN EMPTY STOMACH. 90 Tab 3   • raNITidine (ZANTAC) 150 MG Tab TAKE ONE TABLET BY MOUTH TWICE DAILY 180 Tab 3   • lisinopril (PRINIVIL) 5 MG Tab TAKE ONE TABLET BY MOUTH EVERY DAY 90 Tab 3   • estradiol (ESTRACE) 2 MG Tab TAKE ONE TABLET BY MOUTH EVERY DAY 90 Tab 3   • magnesium oxide (MAG-OX) 400 (241.3 Mg) MG Tab tablet Take 1 " "Tab by mouth every day. 30 Tab 6   • sertraline (ZOLOFT) 25 MG tablet Take 1 Tab by mouth every day. 90 Tab 3   • Multiple Vitamins-Minerals (ICAPS AREDS 2) Cap Take 2 Caps by mouth 2 Times a Day. 100 Cap 3   • vitamin D (CHOLECALCIFEROL) 1000 UNIT TABS Take 2,000 Units by mouth every day.     • omeprazole (PRILOSEC) 20 MG delayed-release capsule Take 1 Cap by mouth every morning before breakfast. 30 Cap 0   • magnesium oxide (MAG-OX) 400 MG Tab      • acetaminophen (TYLENOL) 500 MG TABS Take 1,000 mg by mouth 3 times a day. Indications: Pain       No current facility-administered medications for this visit.        Review of Systems   Constitutional: Negative for chills, fever, malaise/fatigue and weight loss.   HENT: Negative for congestion, sore throat and tinnitus.    Eyes: Negative for blurred vision and double vision.   Respiratory: Negative for cough, hemoptysis, sputum production, shortness of breath, wheezing and stridor.    Cardiovascular: Negative for chest pain, palpitations, orthopnea, leg swelling and PND.   Gastrointestinal: Negative for abdominal pain, diarrhea, heartburn, nausea and vomiting.   Genitourinary:        Incontinence    Musculoskeletal: Positive for back pain.   Skin: Negative for rash.   Neurological: Negative for dizziness, tingling, tremors, sensory change, speech change, focal weakness, loss of consciousness, weakness and headaches.     All others systems reviewed and negative.     Objective:     Height 1.626 m (5' 4\"), weight 81.2 kg (179 lb), last menstrual period 01/01/1993. Body mass index is 30.73 kg/m².    Physical Exam   Constitutional: She is oriented to person, place, and time and well-developed, well-nourished, and in no distress.   HENT:   Head: Normocephalic and atraumatic.   Eyes: Pupils are equal, round, and reactive to light. Conjunctivae and EOM are normal.   Neck: Normal range of motion. Neck supple. No JVD present.   Cardiovascular: Normal rate, regular rhythm, " normal heart sounds and intact distal pulses.  Exam reveals no gallop and no friction rub.    No murmur heard.  Pulmonary/Chest: Effort normal and breath sounds normal. No respiratory distress. She has no wheezes. She has no rales. She exhibits no tenderness.   PPM incision well approximated. No erythema, induration.    Abdominal: Soft. Bowel sounds are normal. There is no tenderness.   Musculoskeletal: Normal range of motion. She exhibits edema (generalized ankle- improved from previous assessments ).   Neurological: She is alert and oriented to person, place, and time.   Skin: Skin is warm and dry. No rash noted. No erythema.   Psychiatric: Mood, memory, affect and judgment normal.     Cardiac Imaging and Procedures Review:    Device Interrogation dated 18  RA: Sensin.6 mV, Threshold: 0.6V, Impedance: 772.  RV: Sensing: (NA - dependent to 50 bpm), Threshold: 0.6 V, Impedance: 773.    Labs (personally reviewed and notable for):   Lab Results   Component Value Date/Time    SODIUM 137 2018 08:49 AM    POTASSIUM 4.8 2018 08:49 AM    CHLORIDE 103 2018 08:49 AM    CO2 25 2018 08:49 AM    GLUCOSE 121 (H) 2018 08:49 AM    BUN 20 2018 08:49 AM    CREATININE 0.84 2018 08:49 AM      Lab Results   Component Value Date/Time    WBC 10.5 2018 08:49 AM    RBC 4.52 2018 08:49 AM    HEMOGLOBIN 14.3 2018 08:49 AM    HEMATOCRIT 45.1 2018 08:49 AM    MCV 99.8 (H) 2018 08:49 AM    MCH 31.6 2018 08:49 AM    MCHC 31.7 (L) 2018 08:49 AM    MPV 10.4 2018 08:49 AM    NEUTSPOLYS 60.70 2018 08:49 AM    LYMPHOCYTES 25.40 2018 08:49 AM    MONOCYTES 8.70 2018 08:49 AM    EOSINOPHILS 4.40 2018 08:49 AM    BASOPHILS 0.40 2018 08:49 AM      PT/INR:   Lab Results   Component Value Date/Time    PROTHROMBTM 32.1 (H) 2018 11:10 AM    INR 2.4 2018   ]      Assessment:     1. Paroxysmal atrial fibrillation (HCC)        2. Pacemaker     3. Chronic anticoagulation     4. H/O atrioventricular izzy ablation     5. H/O cardiac radiofrequency ablation         Medical Decision Making:  Today's Assessment / Status / Plan:     1. pAF S/P AF ablation and AV node ablation:  - clinically improved and doing well.    - Evidence of block post AVN ablation remains.  - Continue Coumadin, Atenolol, but try to decrease to 50 mg BID.  She will monitor her BP with this change and we have discussed parameters for calling.      2. PPM:  - Device interrogated today with normal function and excellent thresholds.  - Battery longevity 7.5 years.     - Suture removed from incision.  She will monitor for any redness/swelling.     3. Anticoagulation:  - Tolerating well.  INR therapeutic.  Denies evidence of internal bleeding.    She will be seeing a spine surgeon, may end up needed MRI.  We discussed that her PPM is MRI compatible but will need to notify us if MRI is needed.     Plan reviewed in detail with the patient and she verbalizes understanding and is in agreement.   RTC in 2 months for review as scheduled w device check, December with Dr. Matthews, sooner if clinical condition changes  Collaborating MD/ADD: VICTORIANO Erazo M.D.  [address] [place] [address]  VIA In Basket

## 2018-08-23 NOTE — TELEPHONE ENCOUNTER
Called Ms Magaña to see if she wanted to reschedule her pacer on 10/30. She already had an appt with JUANCHO in the afternoon and Jayda had wanted me to schedule a pacer along with her appt but the only time available was at 8:15. I told her I would check with Jayda to see if we could squeeze her in for the afternoon but she said she cant. I called and left a vm to see if another day would be better for her so she doesn't have to come twice in the same day.talya

## 2018-08-24 ENCOUNTER — OFFICE VISIT (OUTPATIENT)
Dept: MEDICAL GROUP | Facility: PHYSICIAN GROUP | Age: 78
End: 2018-08-24
Payer: MEDICARE

## 2018-08-24 VITALS
TEMPERATURE: 97.9 F | SYSTOLIC BLOOD PRESSURE: 108 MMHG | HEIGHT: 64 IN | BODY MASS INDEX: 29.88 KG/M2 | DIASTOLIC BLOOD PRESSURE: 58 MMHG | OXYGEN SATURATION: 96 % | HEART RATE: 80 BPM | WEIGHT: 175 LBS

## 2018-08-24 DIAGNOSIS — M32.9 SYSTEMIC LUPUS ERYTHEMATOSUS, UNSPECIFIED SLE TYPE, UNSPECIFIED ORGAN INVOLVEMENT STATUS (HCC): ICD-10-CM

## 2018-08-24 DIAGNOSIS — I10 ESSENTIAL HYPERTENSION: ICD-10-CM

## 2018-08-24 DIAGNOSIS — I48.0 PAROXYSMAL ATRIAL FIBRILLATION (HCC): ICD-10-CM

## 2018-08-24 DIAGNOSIS — Z95.0 CARDIAC PACEMAKER IN SITU: ICD-10-CM

## 2018-08-24 DIAGNOSIS — E66.01 MORBID OBESITY (HCC): ICD-10-CM

## 2018-08-24 DIAGNOSIS — J44.89 OBSTRUCTIVE CHRONIC BRONCHITIS WITHOUT EXACERBATION: ICD-10-CM

## 2018-08-24 DIAGNOSIS — R60.0 LOCALIZED EDEMA: ICD-10-CM

## 2018-08-24 DIAGNOSIS — I50.42 CHRONIC COMBINED SYSTOLIC AND DIASTOLIC HEART FAILURE (HCC): ICD-10-CM

## 2018-08-24 DIAGNOSIS — F32.0 MAJOR DEPRESSIVE DISORDER, SINGLE EPISODE, MILD (HCC): ICD-10-CM

## 2018-08-24 PROCEDURE — 99214 OFFICE O/P EST MOD 30 MIN: CPT | Performed by: FAMILY MEDICINE

## 2018-08-24 RX ORDER — POTASSIUM CHLORIDE 20 MEQ/1
40 TABLET, EXTENDED RELEASE ORAL DAILY
Qty: 180 TAB | Refills: 3
Start: 2018-08-24 | End: 2018-08-27

## 2018-08-24 ASSESSMENT — PAIN SCALES - GENERAL: PAINLEVEL: NO PAIN

## 2018-08-25 NOTE — PROGRESS NOTES
"This medically complex patient comes in after she had a dual-chamber cardiac pacemaker placed in June of this year and then continue to develop problems with arrhythmias.  She then had cardiac ablation done in July of this year.  This is proven successful.  The patient is feeling better.  She has no chest pain or shortness of breath.  She is not having any more atrial fibrillation.  She is working on losing weight.  I encouraged her.  The swelling in her ankles is gotten better.  She would like to come off Spironolactone if possible because she says it makes her avoid too much.  She is on furosemide 80 mg daily.  If I take her off Spironolactone I am going to have to put her back on potassium and she says she is willing to do this.    I reviewed the following    Past Medical History:   Diagnosis Date   • A-fib (HCC)    • Anesthesia     \"Tachycardia for 5 days after cataract surgery\"   • Anticoagulant long-term use 1/12/2012   • Arthritis     Knees, hips   • Asthma     with pneumonia, nothing for 3 years   • Atrial fibrillation (HCC)    • Backpain    • Breath shortness     with exertion O2  2l/m PRN, hasnt used for 3 years   • Bronchitis Nov, 2013   • CAD (coronary artery disease)    • Depression    • Glaucoma 5/3/2011   • Hematoma complicating a procedure 11/3/2012   • High cholesterol    • Hypertension    • Hypothyroid    • Lupus    • Macular degeneration    • Menopause 1/12/2012   • Mitral regurgitation 10/30/2012   • Obesity 1/12/2012   • Pneumonia feb,2013   • Pre-syncope 6/29/2018   • Pulmonary hypertension (HCC) 10/30/2012   • PVC's (premature ventricular contractions) 1/12/2012   • Senile nuclear sclerosis    • Spinal stenosis of lumbar region at multiple levels    • Unspecified cataract     repaired bilateral   • Unspecified urinary incontinence         Past Surgical History:   Procedure Laterality Date   • OTHER CARDIAC SURGERY  07/19/2018    Cardiac Ablation   • PACEMAKER INSERTION  06/30/2018    Dual " "Chamber   • KNEE ARTHROPLASTY TOTAL Right 6/23/2016    Procedure: KNEE ARTHROPLASTY TOTAL;  Surgeon: Heriberto Lozada M.D.;  Location: SURGERY [facility];  Service:    • KNEE ARTHROPLASTY TOTAL Left 5/28/2015    Procedure: KNEE ARTHROPLASTY TOTAL;  Surgeon: Heriberto Lozada M.D.;  Location: SURGERY [facility];  Service:    • CATARACT PHACO WITH IOL Right 5/5/2015    Procedure: IOL OD - STANDARD;  Surgeon: Dmitry Bejarano M.D.;  Location: SURGERY [facility];  Service:    • CATARACT PHACO WITH IOL  4/21/2015    Performed by Dmitry Bejarano M.D. at [facility]   • RECOVERY  11/30/2010    Performed by SURGERY, [facility]-RECOVERY at [facility] DAY [facility]   • COLONOSCOPY  2008    Normal    [facility]   • ABDOMINAL HYSTERECTOMY TOTAL  April 15,1975    still has ovaries   • OPEN REDUCTION      left ankle   • OTHER      Removed pins from left ankle   • TONSILLECTOMY AND ADENOIDECTOMY         Allergies   Allergen Reactions   • Amiodarone Hives     Throat and tongue itching   • Bactrim Shortness of Breath   • Claritin-D [Loratadine-Pseudoephedrine] Palpitations   • Clotrimazole      Stinging / burning on chest   • Metoprolol      Causes throat swelling   • Morphine      Hallucinations   • Qvar [Beclomethasone Dipropionate]      Pressure on heart     • Vibramycin Shortness of Breath   • Atorvastatin Calcium-Polysorbate 80      Muscle aches     • Augmentin      Unknown reaction   • Cipro Xr Swelling   • Diltiazem      rash   • Flecainide      dizziness   • Keflex Unspecified     Pt states \"Unsure\".   • Mucinex      GI Distress     • Tramadol      crying   • Phytoplex Z-Guard [Petrolatum-Zinc Oxide]      \"causes burning\"   • Atorvastatin Myalgia   • Tape Rash     Paper tape okay       Current Outpatient Prescriptions   Medication Sig Dispense Refill   • potassium chloride SA (KDUR) 20 MEQ Tab CR Take 2 Tabs by mouth every day. 180 Tab 3   • atenolol (TENORMIN) 50 MG Tab Take 1 Tab by mouth 2 " times a day. 180 Tab 1   • furosemide (LASIX) 40 MG Tab Take 2 Tabs by mouth every day. 80 mg in the  Tab 3   • warfarin (COUMADIN) 2.5 MG Tab TAKE 1 TO 1.5 TABLETS BY MOUTH ONCE DAILY AS DIRECTED BY THE COUMADIN CLINIC 135 Tab 1   • levothyroxine (SYNTHROID) 50 MCG Tab TAKE 1 TABLET BY MOUTH EVERY MORNING ON AN EMPTY STOMACH. 90 Tab 3   • raNITidine (ZANTAC) 150 MG Tab TAKE ONE TABLET BY MOUTH TWICE DAILY 180 Tab 3   • lisinopril (PRINIVIL) 5 MG Tab TAKE ONE TABLET BY MOUTH EVERY DAY 90 Tab 3   • estradiol (ESTRACE) 2 MG Tab TAKE ONE TABLET BY MOUTH EVERY DAY 90 Tab 3   • magnesium oxide (MAG-OX) 400 (241.3 Mg) MG Tab tablet Take 1 Tab by mouth every day. 30 Tab 6   • sertraline (ZOLOFT) 25 MG tablet Take 1 Tab by mouth every day. 90 Tab 3   • Multiple Vitamins-Minerals (ICAPS AREDS 2) Cap Take 2 Caps by mouth 2 Times a Day. 100 Cap 3   • vitamin D (CHOLECALCIFEROL) 1000 UNIT TABS Take 2,000 Units by mouth every day.     • omeprazole (PRILOSEC) 20 MG delayed-release capsule Take 1 Cap by mouth every morning before breakfast. 30 Cap 0   • magnesium oxide (MAG-OX) 400 MG Tab      • acetaminophen (TYLENOL) 500 MG TABS Take 1,000 mg by mouth 3 times a day. Indications: Pain       No current facility-administered medications for this visit.         Family History   Problem Relation Age of Onset   • Stroke Mother    • Diabetes Father    • Stroke Sister    • Heart Disease Brother    • Stroke Sister    • GI Daughter         Crohn's Disease   • Heart Disease Daughter         CHF   • Other Daughter         Chronic Pain--Lymphedema   • Cancer Paternal Aunt        Social History     Social History   • Marital status:      Spouse name: N/A   • Number of children: N/A   • Years of education: N/A     Occupational History   • Not on file.     Social History Main Topics   • Smoking status: Never Smoker   • Smokeless tobacco: Never Used   • Alcohol use No   • Drug use: No   • Sexual activity: Not Currently     Partners:  Male     Birth control/ protection: Post-Menopausal     Other Topics Concern   • Not on file     Social History Narrative   • No narrative on file      I reviewed the records from the patient's recent admission at [facility].     Physical Exam   Constitutional: She is oriented. She appears well-developed and morbidly obese no distress.   HENT:  Head: Normocephalic and atraumatic.   Right Ear: External ear normal. Ear canal and TM normal   Left Ear: External ear normal. Ear canal and TM normal  Nose: Nose normal.   Mouth/Throat: Oropharynx is clear and moist.   Eyes: Conjunctivae and extraocular motions are normal. Pupils are equal, round, and reactive to light. Fundi benign bilaterally   Neck: No thyromegaly present.   Cardiovascular: Normal rate, regular rhythm, normal heart sounds and intact distal pulses.  Exam reveals no gallop.    No murmur heard.  Pulmonary/Chest: Effort normal and breath sounds normal. No respiratory distress. She has no wheezes. She has no rales.   Abdominal: Soft. Bowel sounds are normal. No hepatosplenomegaly She exhibits no distension. No tenderness. She has no rebound and no guarding.   Musculoskeletal: Normal range of motion. She exhibits no edema and no tenderness.   Lymphadenopathy:     She has no cervical adenopathy.   No supraclavicular adenopathy  Neurological: She is alert and oriented. She has normal reflexes.        Babinskis downgoing bilaterally   Skin: Skin is warm and dry. No rash noted. No erythema.   Psychiatric: She has a normal mood and appropriate affect. Her behavior is normal. Judgment and thought content normal.    1. Obstructive chronic bronchitis without exacerbation (HCC)   doing well   2. Chronic combined systolic and diastolic heart failure (HCC)   stable   3. Major depressive disorder, single episode, mild (HCC)   doing well   4. Morbid obesity (HCC)   I encouraged the patient to continue working on losing weight   5. Paroxysmal atrial fibrillation (HCC)    not fibrillating today   6. Systemic lupus erythematosus, unspecified SLE type, unspecified organ involvement status (HCC)   stable   7. Essential hypertension  potassium chloride SA (KDUR) 20 MEQ Tab CR--resume to p.o. daily.  Discontinue Spironolactone.  Continue furosemide 80 mg p.o. daily.   8. Localized edema  potassium chloride SA (KDUR) 20 MEQ Tab CR--as above   9. Cardiac pacemaker in situ   doing well.     Please note that this dictation was created using voice recognition software. I have worked with consultants from the vendor as well as technical experts from [facility] [email or web address] to optimize the interface. I have made every reasonable attempt to correct obvious errors, but I expect that there are errors of grammar and possibly content that I did not discover before finalizing the note.    Recheck 3 months or as needed

## 2018-08-27 DIAGNOSIS — R60.0 BILATERAL LOWER EXTREMITY EDEMA: ICD-10-CM

## 2018-08-27 RX ORDER — SPIRONOLACTONE 50 MG/1
50 TABLET, FILM COATED ORAL DAILY
Qty: 90 TAB | Refills: 3
Start: 2018-08-27 | End: 2018-10-13 | Stop reason: SDUPTHER

## 2018-09-04 ENCOUNTER — ANTICOAGULATION MONITORING (OUTPATIENT)
Dept: VASCULAR LAB | Facility: MEDICAL CENTER | Age: 78
End: 2018-09-04

## 2018-09-04 DIAGNOSIS — Z79.01 CHRONIC ANTICOAGULATION: ICD-10-CM

## 2018-09-04 LAB — INR PPP: 2.5 (ref 2–3.5)

## 2018-09-07 NOTE — PROGRESS NOTES
Anticoagulation Summary  As of 9/4/2018    INR goal:   2.0-3.0   TTR:   71.2 % (3.2 y)   Today's INR:   2.5   Warfarin maintenance plan:   3.75 mg (2.5 mg x 1.5) on Mon; 2.5 mg (2.5 mg x 1) all other days   Weekly warfarin total:   18.75 mg   Plan last modified:   Ivone Barraza PharmD (5/8/2018)   Next INR check:   9/18/2018   Target end date:   Indefinite    Indications    Atrial fibrillation (HCC) (Resolved) [I48.91]  Chronic anticoagulation [Z79.01]             Anticoagulation Episode Summary     INR check location:   Home Draw    Preferred lab:       Send INR reminders to:       Comments:   Winston YEAGER      Anticoagulation Care Providers     Provider Role Specialty Phone number    Lizzy Haywood M.D. Referring Cardiology [phone]    [facility] Responsible  [phone]    Peter HollowayD Responsible          Anticoagulation Patient Findings    Spoke to the patient on the phone. Patient denies any signs of bleeding or bruising. Patient's INR is therapeutic at 2.5. No recent changes in diet or medications. Patient instructed to continue the current warfarin dosing as shown above. Follow-up in 2 week(s).    Ruy Fontaine, Pharm.D

## 2018-09-10 ENCOUNTER — TELEPHONE (OUTPATIENT)
Dept: CARDIOLOGY | Facility: MEDICAL CENTER | Age: 78
End: 2018-09-10

## 2018-09-10 NOTE — TELEPHONE ENCOUNTER
"----- Message from Michaela Hong R.N. sent at 9/10/2018  8:32 AM PDT -----  Regarding: FW: Patient wants call back about getting home monitor  I forwarded this to \"the girls\" on Friday and called the pt to let her know we are looking into it.     To SG RN for follow up.    ----- Message -----  From: Cindy Faye  Sent: 9/7/2018  12:47 PM  To: Michaela Hong R.N.  Subject: Patient wants call back about getting home m#    EA/Michaela    Patient was told by Anabel that she needs a home monitor. She has spoken to [employer] and they haven't received a request for the monitor. She wants a call back at [phone] or [phone].    "

## 2018-09-11 NOTE — DISCHARGE SUMMARY
DISCHARGE DIAGNOSES:  1.  Recurrent paroxysmal atrial fibrillation.  2.  Atrial tachycardia.  3.  Chronic anticoagulation.  4.  Chronic kidney disease.  5.  Hypothyroidism.  6.  Coronary artery disease.  7.  Multiple drug allergies.  8.  Dual-chamber pacemaker.    HISTORY OF PRESENT HOSPITALIZATION:  The patient is a 77-year-old    female who is followed longitudinally in our office by Dr. Bo Matthews.  The   patient has had a previous atrial fib ablation, but has had recurrent episodes   of atrial fibrillation and now atrial tachycardia with rapid ventricular   response rates resistant to rate control due to hypotension.  Patient   therefore was admitted electively for repeat pulmonary vein isolation and   atrial tachycardia ablation.  On 07/19/2018, please see procedure report.    Complications of the procedure were none.    Conclusions of the procedure were as follows:  1.  Baseline atrial tachycardia near the left atrial appendage ablated and   terminated.  2.  Re-isolation of 3 veins, left, right lower and posterior wall.  3.  Ablation of the AV node.  Patient has a dual-chamber pacemaker in place.    Pacemaker was programmed to DDIR at a rate of 80 at the end of the procedure.    Patient has done well through the night, heart rates have been in the 80s,   blood pressure 102/79.  White count was slightly elevated post-procedure at   16.3, hematocrit 14.7 and 45.6.  Electrolytes:  Sodium 137, potassium 4.7,   chloride 101, CO2 24, BUN 18, creatinine 0.96, EGFR was 56.    EKG morning demonstrating dual-chamber pacing.  Weight preprocedure was 177,   postprocedure 187.  She had some mild edema in her left hand and arm and   bilateral lower extremities.    DISCHARGE MEDICATIONS:  Will include atenolol 50 mg daily, omeprazole 20 mg   daily, Zantac 150 mg twice daily, furosemide 80 mg daily, Synthroid 50 mcg   daily, lisinopril 5 mg daily, Zoloft 25 mg daily, spironolactone 25 mg daily.    She will continue  her warfarin.  Her INR was mildly elevated at 3.15 prior to   procedure.  She will resume her regular doses, which have been 3.75 mg on   Monday and Wednesday and 2.5 mg other days.  INR will be completed in 1 week.    She will continue her other medications as previously noted.    IMPRESSION:  1.  History of atrial tachycardia, status post ablation by Dr. Bo Matthews   07/19/2018.  2.  Paroxysmal atrial fibrillation with prior pulmonary vein isolation with   repeat procedure as noted 07/19/2018 by Dr. Bo Matthews.  3.  Ablation of AV node on 07/19/2018.  4.  Permanent dual-chamber pacemaker, Mo-DV with 3 programming of   device to a DDD I mode at a rate of 80.  5.  Chronic anticoagulation in the form of Coumadin.  6.  Bilateral lower extremity edema, chronic.  7.  Spinal stenosis.  8.  Coronary artery disease.    PLAN:  Patient will be discharged home.  She will have an INR completed in 1   week.  She will report to the emergency room for any problem with vascular   access sites, which all looked good, intact and clean and dry here today.  She   will also report to the emergency room for any of the following, increasing   chest pain, increasing shortness of breath, focal neurological changes or   hemoptysis or temperatures of 101 or greater.  If she has recurrence of atrial   fibrillation with a duration of 2 hours or greater, she will call our office.       ____________________________________     MEGAN Wilkerson / DELIO    DD:  07/20/2018 10:40:31  DT:  07/20/2018 11:13:07    D#:  [id number]  Job#:  [id number]

## 2018-09-11 NOTE — DISCHARGE SUMMARY
DISCHARGE DIAGNOSES:  1.  Recurrent paroxysmal atrial fibrillation.  2.  Atrial tachycardia.  3.  Chronic anticoagulation.  4.  Chronic kidney disease.  5.  Hypothyroidism.  6.  Coronary artery disease.  7.  Multiple drug allergies.  8.  Dual-chamber pacemaker.    HISTORY OF PRESENT HOSPITALIZATION:  The patient is a 77-year-old    female who is followed longitudinally in our office by Dr. Bo Matthews.  The   patient has had a previous atrial fib ablation, but has had recurrent episodes   of atrial fibrillation and now atrial tachycardia with rapid ventricular   response rates resistant to rate control due to hypotension.  Patient   therefore was admitted electively for repeat pulmonary vein isolation and   atrial tachycardia ablation.  On 07/19/2018, please see procedure report.    Complications of the procedure were none.    Conclusions of the procedure were as follows:  1.  Baseline atrial tachycardia near the left atrial appendage ablated and   terminated.  2.  Re-isolation of 3 veins, left, right lower and posterior wall.  3.  Ablation of the AV node.  Patient has a dual-chamber pacemaker in place.    Pacemaker was programmed to DDIR at a rate of 80 at the end of the procedure.    Patient has done well through the night, heart rates have been in the 80s,   blood pressure 102/79.  White count was slightly elevated post-procedure at   16.3, hematocrit 14.7 and 45.6.  Electrolytes:  Sodium 137, potassium 4.7,   chloride 101, CO2 24, BUN 18, creatinine 0.96, EGFR was 56.    EKG morning demonstrating dual-chamber pacing.  Weight preprocedure was 177,   postprocedure 187.  She had some mild edema in her left hand and arm and   bilateral lower extremities.    DISCHARGE MEDICATIONS:  Will include atenolol 50 mg daily, omeprazole 20 mg   daily, Zantac 150 mg twice daily, furosemide 80 mg daily, Synthroid 50 mcg   daily, lisinopril 5 mg daily, Zoloft 25 mg daily, spironolactone 25 mg daily.    She will continue  her warfarin.  Her INR was mildly elevated at 3.15 prior to   procedure.  She will resume her regular doses, which have been 3.75 mg on   Monday and Wednesday and 2.5 mg other days.  INR will be completed in 1 week.    She will continue her other medications as previously noted.    IMPRESSION:  1.  History of atrial tachycardia, status post ablation by Dr. Bo Matthews   07/19/2018.  2.  Paroxysmal atrial fibrillation with prior pulmonary vein isolation with   repeat procedure as noted 07/19/2018 by Dr. Bo Matthews.  3.  Ablation of AV node on 07/19/2018.  4.  Permanent dual-chamber pacemaker, [employer] with 3 programming of   device to a DDD I mode at a rate of 80.  5.  Chronic anticoagulation in the form of Coumadin.  6.  Bilateral lower extremity edema, chronic.  7.  Spinal stenosis.  8.  Coronary artery disease.    PLAN:  Patient will be discharged home.  She will have an INR completed in 1   week.  She will report to the emergency room for any problem with vascular   access sites, which all looked good, intact and clean and dry here today.  She   will also report to the emergency room for any of the following, increasing   chest pain, increasing shortness of breath, focal neurological changes or   hemoptysis or temperatures of 101 or greater.  If she has recurrence of atrial   fibrillation with a duration of 2 hours or greater, she will call our office.       ____________________________________     MEGAN Wilkerson / DELIO    DD:  07/20/2018 10:40:31  DT:  07/20/2018 11:13:07    D#:  [id number]  Job#:  [id number]

## 2018-09-11 NOTE — DISCHARGE SUMMARY
DISCHARGE DIAGNOSES:  1.  Recurrent paroxysmal atrial fibrillation.  2.  Atrial tachycardia.  3.  Chronic anticoagulation.  4.  Chronic kidney disease.  5.  Hypothyroidism.  6.  Coronary artery disease.  7.  Multiple drug allergies.  8.  Dual-chamber pacemaker.    HISTORY OF PRESENT HOSPITALIZATION:  The patient is a 77-year-old    female who is followed longitudinally in our office by Dr. Bo Matthews.  The   patient has had a previous atrial fib ablation, but has had recurrent episodes   of atrial fibrillation and now atrial tachycardia with rapid ventricular   response rates resistant to rate control due to hypotension.  Patient   therefore was admitted electively for repeat pulmonary vein isolation and   atrial tachycardia ablation.  On 07/19/2018, please see procedure report.    Complications of the procedure were none.    Conclusions of the procedure were as follows:  1.  Baseline atrial tachycardia near the left atrial appendage ablated and   terminated.  2.  Re-isolation of 3 veins, left, right lower and posterior wall.  3.  Ablation of the AV node.  Patient has a dual-chamber pacemaker in place.    Pacemaker was programmed to DDIR at a rate of 80 at the end of the procedure.    Patient has done well through the night, heart rates have been in the 80s,   blood pressure 102/79.  White count was slightly elevated post-procedure at   16.3, hematocrit 14.7 and 45.6.  Electrolytes:  Sodium 137, potassium 4.7,   chloride 101, CO2 24, BUN 18, creatinine 0.96, EGFR was 56.    EKG morning demonstrating dual-chamber pacing.  Weight preprocedure was 177,   postprocedure 187.  She had some mild edema in her left hand and arm and   bilateral lower extremities.    DISCHARGE MEDICATIONS:  Will include atenolol 50 mg daily, omeprazole 20 mg   daily, Zantac 150 mg twice daily, furosemide 80 mg daily, Synthroid 50 mcg   daily, lisinopril 5 mg daily, Zoloft 25 mg daily, spironolactone 25 mg daily.    She will continue  her warfarin.  Her INR was mildly elevated at 3.15 prior to   procedure.  She will resume her regular doses, which have been 3.75 mg on   Monday and Wednesday and 2.5 mg other days.  INR will be completed in 1 week.    She will continue her other medications as previously noted.    IMPRESSION:  1.  History of atrial tachycardia, status post ablation by Dr. Bo Matthews   07/19/2018.  2.  Paroxysmal atrial fibrillation with prior pulmonary vein isolation with   repeat procedure as noted 07/19/2018 by Dr. Bo Matthews.  3.  Ablation of AV node on 07/19/2018.  4.  Permanent dual-chamber pacemaker, [employer]Reader with 3 programming of   device to a DDD I mode at a rate of 80.  5.  Chronic anticoagulation in the form of Coumadin.  6.  Bilateral lower extremity edema, chronic.  7.  Spinal stenosis.  8.  Coronary artery disease.    PLAN:  Patient will be discharged home.  She will have an INR completed in 1   week.  She will report to the emergency room for any problem with vascular   access sites, which all looked good, intact and clean and dry here today.  She   will also report to the emergency room for any of the following, increasing   chest pain, increasing shortness of breath, focal neurological changes or   hemoptysis or temperatures of 101 or greater.  If she has recurrence of atrial   fibrillation with a duration of 2 hours or greater, she will call our office.       ____________________________________     MEGAN Wilkerson / DELIO    DD:  07/20/2018 10:40:31  DT:  07/20/2018 11:13:07    D#:  [id number]  Job#:  [id number]

## 2018-09-18 ENCOUNTER — ANTICOAGULATION MONITORING (OUTPATIENT)
Dept: VASCULAR LAB | Facility: MEDICAL CENTER | Age: 78
End: 2018-09-18

## 2018-09-18 DIAGNOSIS — Z79.01 CHRONIC ANTICOAGULATION: ICD-10-CM

## 2018-09-18 LAB — INR PPP: 2.2 (ref 2–3.5)

## 2018-09-18 NOTE — PROGRESS NOTES
OP Telephone Anticoagulation Service Note    Date: 9/18/2018      Anticoagulation Summary  As of 9/18/2018    INR goal:   2.0-3.0   TTR:   71.5 % (3.2 y)   Today's INR:   2.2   Warfarin maintenance plan:   3.75 mg (2.5 mg x 1.5) on Mon; 2.5 mg (2.5 mg x 1) all other days   Weekly warfarin total:   18.75 mg   Plan last modified:   Ivone Barraza, PharmD (5/8/2018)   Next INR check:   10/2/2018   Target end date:   Indefinite    Indications    Atrial fibrillation (HCC) (Resolved) [I48.91]  Chronic anticoagulation [Z79.01]             Anticoagulation Episode Summary     INR check location:   Home Draw    Preferred lab:       Send INR reminders to:       Comments:   Winston YEAGER      Anticoagulation Care Providers     Provider Role Specialty Phone number    Lizzy Haywood M.D. Referring Cardiology [phone]    [facility] Anticoagulation Services Responsible  [phone]    Vladimir Taylor, PharmD Responsible          Anticoagulation Patient Findings  Patient Findings     Negatives:   Signs/symptoms of thrombosis, Signs/symptoms of bleeding, Laboratory test error suspected, Change in health, Change in alcohol use, Change in activity, Upcoming invasive procedure, Emergency department visit, Upcoming dental procedure, Missed doses, Extra doses, Change in medications, Change in diet/appetite, Hospital admission, Bruising, Other complaints            Plan: INR 2.2 therapeutic. Spoke to patient Donte May on phone. Confirmed patient's current warfarin dose. Instructed patient to continue current warfarin regimen. No s/s of bleeding per patient. No new medications or change in diet per patient. Check INR in two weeks. Call clinic at [phone] if there are any questions.     Tito Vazquez, Pharmacy Intern     I have reviewed and agree with the plan above on 09/18/2018      Massiel Coleman, PharmD

## 2018-10-02 ENCOUNTER — ANTICOAGULATION MONITORING (OUTPATIENT)
Dept: VASCULAR LAB | Facility: MEDICAL CENTER | Age: 78
End: 2018-10-02

## 2018-10-02 DIAGNOSIS — Z79.01 CHRONIC ANTICOAGULATION: ICD-10-CM

## 2018-10-02 LAB — INR PPP: 2.5 (ref 2–3.5)

## 2018-10-02 NOTE — PROGRESS NOTES
OP Anticoagulation Telephone Note    Date: 10/2/2018  Anticoagulation Summary  As of 10/2/2018    INR goal:   2.0-3.0   TTR:   71.9 % (3.3 y)   Today's INR:   2.5   Warfarin maintenance plan:   3.75 mg (2.5 mg x 1.5) on Mon; 2.5 mg (2.5 mg x 1) all other days   Weekly warfarin total:   18.75 mg   No change documented:   Roxana Mercedes, Med Ass't   Plan last modified:   Ivone Barraza, PharmD (5/8/2018)   Next INR check:   10/16/2018   Target end date:   Indefinite    Indications    Atrial fibrillation (HCC) (Resolved) [I48.91]  Chronic anticoagulation [Z79.01]             Anticoagulation Episode Summary     INR check location:   Home Draw    Preferred lab:       Send INR reminders to:       Comments:   Winston YEAGER      Anticoagulation Care Providers     Provider Role Specialty Phone number    Lizzy Haywood M.D. Referring Cardiology [phone]    [facility] Anticoagulation Services Responsible  [phone]    Vladimir Taylor, PharmD Responsible          Anticoagulation Patient Findings  Patient Findings     Negatives:   Signs/symptoms of thrombosis, Signs/symptoms of bleeding, Laboratory test error suspected, Change in health, Change in alcohol use, Change in activity, Upcoming invasive procedure, Emergency department visit, Upcoming dental procedure, Missed doses, Extra doses, Change in medications, Change in diet/appetite, Hospital admission, Bruising, Other complaints      Plan:  Spoke with patient on the phone. Patient is therapeutic today. Patient denies any changes in medications or diet. Patient denies any signs or symptoms of bleeding or clotting. Instructed patient to call clinic if any unusual bleeding or bruising occurs. Will continue dosing as outlined above. Will follow-up with patient in 2 weeks.    Roxana Mercedes, Medical Assistant    I have reviewed and concur with the above plan on 10/02/2018.  Massiel Coleman, Clinical Pharmacist

## 2018-10-09 ENCOUNTER — TELEPHONE (OUTPATIENT)
Dept: CARDIOLOGY | Facility: MEDICAL CENTER | Age: 78
End: 2018-10-09

## 2018-10-09 DIAGNOSIS — H35.30 MACULAR DEGENERATION OF BOTH EYES, UNSPECIFIED TYPE: ICD-10-CM

## 2018-10-09 DIAGNOSIS — N32.89 BLADDER INSTABILITY: ICD-10-CM

## 2018-10-09 RX ORDER — SPIRONOLACTONE 25 MG
1 TABLET ORAL DAILY
Qty: 90 CAP | Refills: 3
Start: 2018-10-09 | End: 2024-01-18 | Stop reason: SDUPTHER

## 2018-10-09 NOTE — TELEPHONE ENCOUNTER
Gretchen Menard, [facility] Ass't   You 2 hours ago (8:21 AM)      Alex Rutledge, this patient is getting a pacer at 8:15 on the 30th and then seeing Dr. Haywood the same day at 3 pm.  Her MRI is on the 31st.  Can't she just get her EKG when she sees Dr. Haywood at 3 during the visit?  (Routing comment)       VICTORIANO Godoy 2 hours ago (7:50 AM)         Good morning Dr. Lees,   I am scheduled for a Pace Maker checkup on Oct. 30.  I am going to have a MRI on Oct. 31.  They have told me   that I need to have  a   12 lead EKG before that.  I wonder if you could schedule it for the 30th when I have my   Pacer check.   Please let me know as soon as possible.   Thank you, Donte HOLLINS  Have a nice day   [place]

## 2018-10-10 ENCOUNTER — PATIENT OUTREACH (OUTPATIENT)
Dept: HEALTH INFORMATION MANAGEMENT | Facility: OTHER | Age: 78
End: 2018-10-10

## 2018-10-10 NOTE — PROGRESS NOTES
Outcome: call back    Please transfer to Patient Outreach Team at [phone] when patient returns call.    WebIZ Checked & Epic Updated:  yes    [facility] Verified: yes    Attempt # 1

## 2018-10-16 ENCOUNTER — ANTICOAGULATION MONITORING (OUTPATIENT)
Dept: VASCULAR LAB | Facility: MEDICAL CENTER | Age: 78
End: 2018-10-16

## 2018-10-16 DIAGNOSIS — Z79.01 CHRONIC ANTICOAGULATION: ICD-10-CM

## 2018-10-16 LAB — INR PPP: 2.9 (ref 2–3.5)

## 2018-10-16 NOTE — PROGRESS NOTES
Anticoagulation Summary  As of 10/16/2018    INR goal:   2.0-3.0   TTR:   72.2 % (3.3 y)   Today's INR:   2.9   Warfarin maintenance plan:   3.75 mg (2.5 mg x 1.5) on Mon; 2.5 mg (2.5 mg x 1) all other days   Weekly warfarin total:   18.75 mg   Plan last modified:   Ivone Barraza, PharmD (5/8/2018)   Next INR check:      Target end date:   Indefinite    Indications    Atrial fibrillation (HCC) (Resolved) [I48.91]  Chronic anticoagulation [Z79.01]             Anticoagulation Episode Summary     INR check location:   Home Draw    Preferred lab:       Send INR reminders to:       Comments:   Winston YEAGER      Anticoagulation Care Providers     Provider Role Specialty Phone number    Lizzy Haywood M.D. Referring Cardiology [phone]    [facility] Anticoagulation Services Responsible  [phone]    Vladimir Taylor, PharmD Responsible          Anticoagulation Patient Findings    INR therapeutic at 2.9.  Spoke w/ pt on phone.  Verified regimen w/ pt.  Continue current regimen.  NO s/s bleeding reported per pt.  NO changes in diet reported per pt.  NO changes in medications reported per pt.  Check INR in 2 weeks (10/30).  Instructed pt to call clinic at [phone] if there are any questions.  Pt stated understanding.    Ranjit Wilkins  2019 Doctor of Pharmacy Candidate    I have reviewed and concur with the above plan on 10/16/2018.  Massiel Coleman, Clinical Pharmacist

## 2018-10-24 ENCOUNTER — TELEPHONE (OUTPATIENT)
Dept: CARDIOLOGY | Facility: MEDICAL CENTER | Age: 78
End: 2018-10-24

## 2018-10-24 ENCOUNTER — HOSPITAL ENCOUNTER (OUTPATIENT)
Dept: LAB | Facility: MEDICAL CENTER | Age: 78
End: 2018-10-24
Attending: NURSE PRACTITIONER
Payer: MEDICARE

## 2018-10-24 DIAGNOSIS — I48.19 PERSISTENT ATRIAL FIBRILLATION (HCC): ICD-10-CM

## 2018-10-24 LAB
ANION GAP SERPL CALC-SCNC: 8 MMOL/L (ref 0–11.9)
BUN SERPL-MCNC: 20 MG/DL (ref 8–22)
CALCIUM SERPL-MCNC: 9.9 MG/DL (ref 8.5–10.5)
CHLORIDE SERPL-SCNC: 103 MMOL/L (ref 96–112)
CO2 SERPL-SCNC: 28 MMOL/L (ref 20–33)
CREAT SERPL-MCNC: 0.94 MG/DL (ref 0.5–1.4)
FASTING STATUS PATIENT QL REPORTED: NORMAL
GLUCOSE SERPL-MCNC: 109 MG/DL (ref 65–99)
POTASSIUM SERPL-SCNC: 5 MMOL/L (ref 3.6–5.5)
SODIUM SERPL-SCNC: 139 MMOL/L (ref 135–145)

## 2018-10-24 PROCEDURE — 80048 BASIC METABOLIC PNL TOTAL CA: CPT

## 2018-10-24 PROCEDURE — 36415 COLL VENOUS BLD VENIPUNCTURE: CPT

## 2018-10-24 NOTE — TELEPHONE ENCOUNTER
----- Message from Mariah Ríos sent at 10/24/2018  8:04 AM PDT -----  Regarding: pt at lab - needs orders asap  Contact: [phone]  SHANA/vinita Reveles calling from [facility] [place], with patient waiting for draw, asking for orders for pt's blood work in preparation for MRI with sedation.  Please call Anushka at [phone] once entered into [facility].

## 2018-10-30 ENCOUNTER — ANTICOAGULATION MONITORING (OUTPATIENT)
Dept: VASCULAR LAB | Facility: MEDICAL CENTER | Age: 78
End: 2018-10-30

## 2018-10-30 ENCOUNTER — OFFICE VISIT (OUTPATIENT)
Dept: CARDIOLOGY | Facility: MEDICAL CENTER | Age: 78
End: 2018-10-30
Payer: MEDICARE

## 2018-10-30 ENCOUNTER — NON-PROVIDER VISIT (OUTPATIENT)
Dept: CARDIOLOGY | Facility: MEDICAL CENTER | Age: 78
End: 2018-10-30
Payer: MEDICARE

## 2018-10-30 VITALS
BODY MASS INDEX: 31.76 KG/M2 | HEIGHT: 64 IN | OXYGEN SATURATION: 97 % | SYSTOLIC BLOOD PRESSURE: 130 MMHG | HEART RATE: 80 BPM | WEIGHT: 186 LBS | DIASTOLIC BLOOD PRESSURE: 80 MMHG

## 2018-10-30 DIAGNOSIS — I44.30 AV BLOCK: ICD-10-CM

## 2018-10-30 DIAGNOSIS — F41.9 ANXIETY: ICD-10-CM

## 2018-10-30 DIAGNOSIS — Z95.0 CARDIAC PACEMAKER IN SITU: ICD-10-CM

## 2018-10-30 DIAGNOSIS — I48.91 ATRIAL FIBRILLATION, UNSPECIFIED TYPE (HCC): ICD-10-CM

## 2018-10-30 DIAGNOSIS — I48.19 PERSISTENT ATRIAL FIBRILLATION (HCC): ICD-10-CM

## 2018-10-30 DIAGNOSIS — Z79.01 CHRONIC ANTICOAGULATION: ICD-10-CM

## 2018-10-30 DIAGNOSIS — F32.0 CURRENT MILD EPISODE OF MAJOR DEPRESSIVE DISORDER WITHOUT PRIOR EPISODE (HCC): ICD-10-CM

## 2018-10-30 DIAGNOSIS — I25.10 CORONARY ARTERY DISEASE INVOLVING NATIVE CORONARY ARTERY OF NATIVE HEART WITHOUT ANGINA PECTORIS: ICD-10-CM

## 2018-10-30 PROBLEM — R60.0 BILATERAL LOWER EXTREMITY EDEMA: Status: RESOLVED | Noted: 2018-06-29 | Resolved: 2018-10-30

## 2018-10-30 PROBLEM — R55 PRE-SYNCOPE: Status: RESOLVED | Noted: 2018-06-29 | Resolved: 2018-10-30

## 2018-10-30 PROBLEM — R79.89 ELEVATED SERUM CREATININE: Status: RESOLVED | Noted: 2018-06-29 | Resolved: 2018-10-30

## 2018-10-30 PROBLEM — I95.9 HYPOTENSION: Status: RESOLVED | Noted: 2018-06-29 | Resolved: 2018-10-30

## 2018-10-30 PROBLEM — R79.1 SUPRATHERAPEUTIC INR: Status: RESOLVED | Noted: 2018-06-29 | Resolved: 2018-10-30

## 2018-10-30 PROBLEM — D72.829 LEUKOCYTOSIS: Status: RESOLVED | Noted: 2018-06-29 | Resolved: 2018-10-30

## 2018-10-30 LAB
EKG IMPRESSION: NORMAL
INR PPP: 2.3 (ref 2–3.5)

## 2018-10-30 PROCEDURE — 93000 ELECTROCARDIOGRAM COMPLETE: CPT | Performed by: INTERNAL MEDICINE

## 2018-10-30 PROCEDURE — 93280 PM DEVICE PROGR EVAL DUAL: CPT | Performed by: INTERNAL MEDICINE

## 2018-10-30 PROCEDURE — 99214 OFFICE O/P EST MOD 30 MIN: CPT | Performed by: INTERNAL MEDICINE

## 2018-10-30 ASSESSMENT — ENCOUNTER SYMPTOMS
CONSTIPATION: 0
PALPITATIONS: 0
WEIGHT LOSS: 0
DIZZINESS: 0
EYES NEGATIVE: 1
HEARTBURN: 0
BRUISES/BLEEDS EASILY: 0
MEMORY LOSS: 0
LOSS OF CONSCIOUSNESS: 0
BACK PAIN: 1
PND: 0
DIARRHEA: 0
SHORTNESS OF BREATH: 0
COUGH: 0
NERVOUS/ANXIOUS: 1
BLOOD IN STOOL: 0
DEPRESSION: 1
ABDOMINAL PAIN: 0
MYALGIAS: 0
INSOMNIA: 0
FALLS: 0
NAUSEA: 0

## 2018-10-30 NOTE — PROGRESS NOTES
Attempted to contact pt today. She did not answer and does not have a voicemail set up. Will try to contact again later today.    Joe Kirby, Pharmacy Intern

## 2018-10-30 NOTE — PROGRESS NOTES
"Subjective:   Donte Magaña is a 77y.o. female who presents today in follow-up in regards to her valvular heart disease, coronary disease and atrial fibrillation on anticoagulation and 2017 ablation    In with her , doing much better after her pacemaker.  Off dofetilide.  Heart rate excellent  Tons of back pain getting an MRI very nervous.  Has been said she has been depressed        Past Medical History:   Diagnosis Date   • A-fib (HCC)    • Anesthesia     \"Tachycardia for 5 days after cataract surgery\"   • Anticoagulant long-term use 1/12/2012   • Arthritis     Knees, hips   • Asthma     with pneumonia, nothing for 3 years   • Atrial fibrillation (HCC)    • Backpain    • Breath shortness     with exertion O2  2l/m PRN, hasnt used for 3 years   • Bronchitis Nov, 2013   • CAD (coronary artery disease)    • Depression    • Glaucoma 5/3/2011   • Hematoma complicating a procedure 11/3/2012   • High cholesterol    • Hypertension    • Hypothyroid    • Lupus    • Macular degeneration    • Menopause 1/12/2012   • Mitral regurgitation 10/30/2012   • Obesity 1/12/2012   • Pneumonia feb,2013   • Pre-syncope 6/29/2018   • Pulmonary hypertension (HCC) 10/30/2012   • PVC's (premature ventricular contractions) 1/12/2012   • Senile nuclear sclerosis    • Spinal stenosis of lumbar region at multiple levels    • Unspecified cataract     repaired bilateral   • Unspecified urinary incontinence      Past Surgical History:   Procedure Laterality Date   • OTHER CARDIAC SURGERY  07/19/2018    Cardiac Ablation   • PACEMAKER INSERTION  06/30/2018    Dual Chamber   • KNEE ARTHROPLASTY TOTAL Right 6/23/2016    Procedure: KNEE ARTHROPLASTY TOTAL;  Surgeon: Heriberto Lozada M.D.;  Location: SURGERY [facility];  Service:    • KNEE ARTHROPLASTY TOTAL Left 5/28/2015    Procedure: KNEE ARTHROPLASTY TOTAL;  Surgeon: Heriberto Lozada M.D.;  Location: [facility];  Service:    • CATARACT PHACO WITH IOL Right 5/5/2015    " "Procedure: IOL OD - STANDARD;  Surgeon: Dmitry Bejarano M.D.;  Location: SURGERY [facility]S;  Service:    • CATARACT PHACO WITH IOL  4/21/2015    Performed by Dmitry Bejarano M.D. at SURGERY [facility] ORS   • RECOVERY  11/30/2010    Performed by SURGERY, [facility]-RECOVERY at SURGERY SAME DAY [facility] ORS   • COLONOSCOPY  2008    Normal    [facility]   • ABDOMINAL HYSTERECTOMY TOTAL  April 15,1975    still has ovaries   • OPEN REDUCTION      left ankle   • OTHER      Removed pins from left ankle   • TONSILLECTOMY AND ADENOIDECTOMY       Family History   Problem Relation Age of Onset   • Stroke Mother    • Diabetes Father    • Stroke Sister    • Heart Disease Brother    • Stroke Sister    • GI Daughter         Crohn's Disease   • Heart Disease Daughter         CHF   • Other Daughter         Chronic Pain--Lymphedema   • Cancer Paternal Aunt      History   Smoking Status   • Never Smoker   Smokeless Tobacco   • Never Used     Allergies   Allergen Reactions   • Amiodarone Hives     Throat and tongue itching   • Bactrim Shortness of Breath   • Claritin-D [Loratadine-Pseudoephedrine] Palpitations   • Clotrimazole      Stinging / burning on chest   • Metoprolol      Causes throat swelling   • Morphine      Hallucinations   • Qvar [Beclomethasone Dipropionate]      Pressure on heart     • Vibramycin Shortness of Breath   • Atorvastatin Calcium-Polysorbate 80      Muscle aches     • Augmentin      Unknown reaction   • Cipro Xr Swelling   • Diltiazem      rash   • Flecainide      dizziness   • Keflex Unspecified     Pt states \"Unsure\".   • Mucinex      GI Distress     • Tramadol      crying   • Phytoplex Z-Guard [Petrolatum-Zinc Oxide]      \"causes burning\"   • Atorvastatin Myalgia   • Tape Rash     Paper tape okay     Outpatient Encounter Prescriptions as of 10/30/2018   Medication Sig Dispense Refill   • spironolactone (ALDACTONE) 50 MG Tab TAKE  ONE TABLET BY MOUTH EVERY MORNING 90 Tab 3   • Lutein 20 MG Cap " Take 1 Cap by mouth every day. 90 Cap 3   • Mirabegron ER 25 MG TABLET SR 24 HR Take 1 Tab by mouth every day. 30 Tab 3   • atenolol (TENORMIN) 50 MG Tab Take 1 Tab by mouth 2 times a day. 180 Tab 1   • furosemide (LASIX) 40 MG Tab Take 2 Tabs by mouth every day. 80 mg in the  Tab 3   • warfarin (COUMADIN) 2.5 MG Tab TAKE 1 TO 1.5 TABLETS BY MOUTH ONCE DAILY AS DIRECTED BY THE COUMADIN CLINIC 135 Tab 1   • levothyroxine (SYNTHROID) 50 MCG Tab TAKE 1 TABLET BY MOUTH EVERY MORNING ON AN EMPTY STOMACH. 90 Tab 3   • raNITidine (ZANTAC) 150 MG Tab TAKE ONE TABLET BY MOUTH TWICE DAILY 180 Tab 3   • lisinopril (PRINIVIL) 5 MG Tab TAKE ONE TABLET BY MOUTH EVERY DAY 90 Tab 3   • estradiol (ESTRACE) 2 MG Tab TAKE ONE TABLET BY MOUTH EVERY DAY 90 Tab 3   • magnesium oxide (MAG-OX) 400 (241.3 Mg) MG Tab tablet Take 1 Tab by mouth every day. 30 Tab 6   • vitamin D (CHOLECALCIFEROL) 1000 UNIT TABS Take 2,000 Units by mouth every day.     • magnesium oxide (MAG-OX) 400 MG Tab      • [DISCONTINUED] sertraline (ZOLOFT) 25 MG tablet Take 1 Tab by mouth every day. 90 Tab 3   • acetaminophen (TYLENOL) 500 MG TABS Take 1,000 mg by mouth 3 times a day. Indications: Pain       Facility-Administered Encounter Medications as of 10/30/2018   Medication Dose Route Frequency Provider Last Rate Last Dose   • sertraline (ZOLOFT) tablet 50 mg  50 mg Oral DAILY Lizzy Haywood M.D.         Review of Systems   Constitutional: Negative for malaise/fatigue and weight loss.   Eyes: Negative.    Respiratory: Negative for cough and shortness of breath.    Cardiovascular: Negative for chest pain, palpitations, leg swelling and PND.   Gastrointestinal: Negative for abdominal pain, blood in stool, constipation, diarrhea, heartburn, melena and nausea.   Genitourinary: Negative.    Musculoskeletal: Positive for back pain. Negative for falls, joint pain and myalgias.        No changes   Neurological: Negative for dizziness and loss of consciousness.  "  Endo/Heme/Allergies: Does not bruise/bleed easily.   Psychiatric/Behavioral: Positive for depression. Negative for memory loss. The patient is nervous/anxious. The patient does not have insomnia.    All other systems reviewed and are negative.       Objective:   /80 (BP Location: Left arm, Patient Position: Sitting)   Pulse 80   Ht 1.626 m (5' 4\")   Wt 84.4 kg (186 lb)   LMP 01/01/1993   SpO2 97%   BMI 31.93 kg/m²     Physical Exam   Constitutional: She is oriented to person, place, and time. She appears well-developed and well-nourished.   Patient seen again today and examined, changes noted   HENT:   Head: Normocephalic and atraumatic.   Mouth/Throat: Mucous membranes are normal. No oropharyngeal exudate.   Eyes: Pupils are equal, round, and reactive to light. EOM are normal. No scleral icterus.   Neck: No JVD present. No thyroid mass and no thyromegaly present.   Cardiovascular: Normal rate, regular rhythm and intact distal pulses.    Murmur heard.  2/6 systolic blowing murmur;  heart rate 80 on my exam.  Pacemaker in left chest   Pulmonary/Chest: Effort normal and breath sounds normal. She has no wheezes.   Abdominal: Bowel sounds are normal.   Musculoskeletal: Normal range of motion. She exhibits edema (1+ nonpitting bilaterally no stasis changes or ulcers). She exhibits no tenderness.   Neurological: She is alert and oriented to person, place, and time. She has normal strength. She displays no tremor. She exhibits normal muscle tone. Coordination normal.   Skin: Skin is warm and dry. No rash noted.   Psychiatric: She has a normal mood and affect. Her behavior is normal.   Tearful but appropriate   Vitals reviewed.      Assessment:     1. Atrial fibrillation, unspecified type (HCC)  EKG   2. Anxiety  sertraline (ZOLOFT) tablet 50 mg   3. Persistent atrial fibrillation (HCC)     4. Current mild episode of major depressive disorder without prior episode (HCC)     5. Chronic anticoagulation     6. " Coronary artery disease involving native coronary artery of native heart without angina pectoris     7. Cardiac pacemaker in situ         Medical Decision Making:  Today's Assessment / Status / Plan:     Atrial fibrillation   Anticoagulation, reviewed blood work including CBC  Quiescent after ablation     mild mitral regurgitation as above. Annual echo due in the fall/winter I will order when I see her next    Hypertension  Likely related to back pain  Reassurance  Okay to take an extra half lisinopril as needed, discussed goals    Multiple drug sensitivities  Frustrating, reassurance given as always    Coronary disease, moderate on angiogram 2013  Reassuring and normal nuclear perfusion imaging study 2016  Statin and aspirin    Polypharmacy   As above  She has had complications in the past with procedures due to hematoma and a drug allergy  Reassurance given as always    Depression, likely situational  I took the liberty of increasing her sertraline, talked about potential side effects      RTC 6 months, sooner with concerns        Physical Exam   Constitutional: She is oriented to person, place, and time. She appears well-developed and well-nourished.   Patient seen again today and examined, changes noted   HENT:   Head: Normocephalic and atraumatic.   Mouth/Throat: Mucous membranes are normal. No oropharyngeal exudate.   Eyes: Pupils are equal, round, and reactive to light. EOM are normal. No scleral icterus.   Neck: No JVD present. No thyroid mass and no thyromegaly present.   Cardiovascular: Normal rate, regular rhythm and intact distal pulses.    Murmur heard.  2/6 systolic blowing murmur;  heart rate 80 on my exam.  Pacemaker in left chest   Pulmonary/Chest: Effort normal and breath sounds normal. She has no wheezes.   Abdominal: Bowel sounds are normal.   Musculoskeletal: Normal range of motion. She exhibits edema (1+ nonpitting bilaterally no stasis changes or ulcers). She exhibits no tenderness.    Neurological: She is alert and oriented to person, place, and time. She has normal strength. She displays no tremor. She exhibits normal muscle tone. Coordination normal.   Skin: Skin is warm and dry. No rash noted.   Psychiatric: She has a normal mood and affect. Her behavior is normal.   Tearful but appropriate   Vitals reviewed.

## 2018-10-31 ENCOUNTER — HOSPITAL ENCOUNTER (OUTPATIENT)
Dept: RADIOLOGY | Facility: MEDICAL CENTER | Age: 78
End: 2018-10-31
Attending: NURSE PRACTITIONER
Payer: MEDICARE

## 2018-10-31 VITALS
OXYGEN SATURATION: 97 % | HEIGHT: 64 IN | WEIGHT: 185 LBS | DIASTOLIC BLOOD PRESSURE: 63 MMHG | RESPIRATION RATE: 16 BRPM | SYSTOLIC BLOOD PRESSURE: 135 MMHG | HEART RATE: 85 BPM | BODY MASS INDEX: 31.58 KG/M2 | TEMPERATURE: 97.8 F

## 2018-10-31 DIAGNOSIS — M41.86 OTHER FORMS OF SCOLIOSIS, LUMBAR REGION: ICD-10-CM

## 2018-10-31 DIAGNOSIS — M25.551 RIGHT HIP PAIN: ICD-10-CM

## 2018-10-31 PROCEDURE — 73502 X-RAY EXAM HIP UNI 2-3 VIEWS: CPT | Mod: RT

## 2018-10-31 PROCEDURE — 72148 MRI LUMBAR SPINE W/O DYE: CPT

## 2018-10-31 PROCEDURE — 700111 HCHG RX REV CODE 636 W/ 250 OVERRIDE (IP)

## 2018-10-31 PROCEDURE — 72100 X-RAY EXAM L-S SPINE 2/3 VWS: CPT

## 2018-10-31 PROCEDURE — 01922 ANES N-INVAS IMG/RADJ THER: CPT

## 2018-10-31 ASSESSMENT — PAIN SCALES - GENERAL
PAINLEVEL_OUTOF10: 4
PAINLEVEL_OUTOF10: 5
PAINLEVEL_OUTOF10: 4

## 2018-10-31 NOTE — PROGRESS NOTES
Outcome: Left Message    Please transfer to Patient Outreach Team at [phone] when patient returns call.        Attempt #2

## 2018-10-31 NOTE — PROGRESS NOTES
OP Anticoagulation Telephone Note    Date: 10/31/2018  Anticoagulation Summary  As of 10/30/2018    INR goal:   2.0-3.0   TTR:   72.5 % (3.4 y)   Today's INR:   2.3   Warfarin maintenance plan:   3.75 mg (2.5 mg x 1.5) on Mon; 2.5 mg (2.5 mg x 1) all other days   Weekly warfarin total:   18.75 mg   No change documented:   Roxana Mercedes, Med Ass't   Plan last modified:   Ivone Barraza PharmD (5/8/2018)   Next INR check:   11/13/2018   Target end date:   Indefinite    Indications    Atrial fibrillation (HCC) (Resolved) [I48.91]  Chronic anticoagulation [Z79.01]             Anticoagulation Episode Summary     INR check location:   Home Draw    Preferred lab:       Send INR reminders to:       Comments:   Winston YEAGER      Anticoagulation Care Providers     Provider Role Specialty Phone number    Lizzy Haywood M.D. Referring Cardiology [phone]    [facility] Anticoagulation Services Responsible  [phone]    Vladimir Taylor, PharmD Responsible          Anticoagulation Patient Findings  Patient Findings     Negatives:   Signs/symptoms of thrombosis, Signs/symptoms of bleeding, Laboratory test error suspected, Change in health, Change in alcohol use, Change in activity, Upcoming invasive procedure, Emergency department visit, Upcoming dental procedure, Missed doses, Extra doses, Change in medications, Change in diet/appetite, Hospital admission, Bruising, Other complaints      Plan:  Spoke with patient on the phone. Patient is therapeutic today. Patient denies any changes in medications or diet. Patient denies any signs or symptoms of bleeding or clotting. Instructed patient to call clinic if any unusual bleeding or bruising occurs. Will continue dosing as outlined above. Will follow-up with patient in 2 weeks.    Roxana Mercedes, Medical Assistant    I have reviewed and am in agreement with the above stated plan on 10-31-18.  Vladimir Taylor, PharmD

## 2018-11-07 NOTE — PROGRESS NOTES
1. Attempt #:3    2. [facility] Verified: yes    3. Verify PCP: yes    4. Review Care Team: yes    5. WebIZ Checked & Epic Updated: Yes      6. Reviewed/Updated the following with patient:       •   Communication Preference Obtained? YES       •   Preferred Pharmacy? YES       •   Preferred Lab? YES       •   Family History (document living status of immediate family members and if + hx of cancer, diabetes, hypertension, hyperlipidemia, heart attack, stroke) NO- WAS RECENTLY REVIEWED    7. Annual Wellness Visit Scheduling  · Scheduling Status:Scheduled     8. Care Gap Scheduling (Attempt to Schedule EACH Overdue Care Gap!)     Health Maintenance Due   Topic Date Due   • Annual Wellness Visit  1940   • IMM ZOSTER VACCINES (1 of 2) 11/16/1988   • COLONOSCOPY  09/29/2015        Scheduled patient for Annual Wellness Visit     9. Absio Activation: already active    10. Absio Ashwin: no    11. Virtual Visits: no    12. Opt In to Text Messages: no    13. Patient was advised: “This is a free wellness visit. The provider will screen for medical conditions to help you stay healthy. If you have other concerns to address you may be asked to discuss these at a separate visit or there may be an additional fee.”     14. Patient was informed to arrive 15 min prior to their scheduled appointment and bring in their medication bottles.

## 2018-11-13 ENCOUNTER — ANTICOAGULATION MONITORING (OUTPATIENT)
Dept: VASCULAR LAB | Facility: MEDICAL CENTER | Age: 78
End: 2018-11-13

## 2018-11-13 DIAGNOSIS — Z79.01 CHRONIC ANTICOAGULATION: ICD-10-CM

## 2018-11-13 LAB — INR PPP: 2.8 (ref 2–3.5)

## 2018-11-13 NOTE — PROGRESS NOTES
Anticoagulation Summary  As of 11/13/2018    INR goal:   2.0-3.0   TTR:   72.8 % (3.4 y)   Today's INR:   2.8   Warfarin maintenance plan:   3.75 mg (2.5 mg x 1.5) on Mon; 2.5 mg (2.5 mg x 1) all other days   Weekly warfarin total:   18.75 mg   Plan last modified:   Ivone Barraza, PharmD (5/8/2018)   Next INR check:   11/27/2018   Target end date:   Indefinite    Indications    Atrial fibrillation (HCC) (Resolved) [I48.91]  Chronic anticoagulation [Z79.01]             Anticoagulation Episode Summary     INR check location:   Home Draw    Preferred lab:       Send INR reminders to:       Comments:   Winston YEAGER      Anticoagulation Care Providers     Provider Role Specialty Phone number    Lizzy Haywood M.D. Referring Cardiology [phone]    [facility] Anticoagulation Services Responsible  [phone]    Vladimir Taylor, PharmD Responsible          Anticoagulation Patient Findings        Spoke to patient on the phone.   INR  therapeutic.   Denies signs/symptoms of bleeding and/or thrombosis.   Denies changes to diet or medications.   Follow up appointment in 2 week(s).    Continue weekly warfarin dose as noted      Fran Leyva, PharmD

## 2018-11-27 ENCOUNTER — ANTICOAGULATION MONITORING (OUTPATIENT)
Dept: VASCULAR LAB | Facility: MEDICAL CENTER | Age: 78
End: 2018-11-27

## 2018-11-27 DIAGNOSIS — Z79.01 CHRONIC ANTICOAGULATION: ICD-10-CM

## 2018-11-27 LAB — INR PPP: 2.8 (ref 2–3.5)

## 2018-11-28 ENCOUNTER — HOSPITAL ENCOUNTER (OUTPATIENT)
Dept: LAB | Facility: MEDICAL CENTER | Age: 78
End: 2018-11-28
Attending: FAMILY MEDICINE
Payer: MEDICARE

## 2018-11-28 ENCOUNTER — OFFICE VISIT (OUTPATIENT)
Dept: MEDICAL GROUP | Facility: PHYSICIAN GROUP | Age: 78
End: 2018-11-28
Payer: MEDICARE

## 2018-11-28 VITALS
SYSTOLIC BLOOD PRESSURE: 132 MMHG | OXYGEN SATURATION: 95 % | WEIGHT: 186 LBS | BODY MASS INDEX: 31.76 KG/M2 | TEMPERATURE: 97 F | RESPIRATION RATE: 16 BRPM | HEIGHT: 64 IN | HEART RATE: 99 BPM | DIASTOLIC BLOOD PRESSURE: 60 MMHG

## 2018-11-28 DIAGNOSIS — R19.7 DIARRHEA, UNSPECIFIED TYPE: ICD-10-CM

## 2018-11-28 DIAGNOSIS — E78.2 MIXED HYPERLIPIDEMIA: ICD-10-CM

## 2018-11-28 DIAGNOSIS — L03.317 CELLULITIS OF BUTTOCK: ICD-10-CM

## 2018-11-28 DIAGNOSIS — B35.9 TINEA: ICD-10-CM

## 2018-11-28 DIAGNOSIS — J30.1 SEASONAL ALLERGIC RHINITIS DUE TO POLLEN: ICD-10-CM

## 2018-11-28 LAB
ANION GAP SERPL CALC-SCNC: 11 MMOL/L (ref 0–11.9)
BASOPHILS # BLD AUTO: 0.6 % (ref 0–1.8)
BASOPHILS # BLD: 0.06 K/UL (ref 0–0.12)
BUN SERPL-MCNC: 25 MG/DL (ref 8–22)
CALCIUM SERPL-MCNC: 9.8 MG/DL (ref 8.5–10.5)
CHLORIDE SERPL-SCNC: 104 MMOL/L (ref 96–112)
CO2 SERPL-SCNC: 24 MMOL/L (ref 20–33)
CREAT SERPL-MCNC: 1.06 MG/DL (ref 0.5–1.4)
EOSINOPHIL # BLD AUTO: 0.27 K/UL (ref 0–0.51)
EOSINOPHIL NFR BLD: 2.6 % (ref 0–6.9)
ERYTHROCYTE [DISTWIDTH] IN BLOOD BY AUTOMATED COUNT: 46.9 FL (ref 35.9–50)
GLUCOSE SERPL-MCNC: 126 MG/DL (ref 65–99)
HCT VFR BLD AUTO: 45.5 % (ref 37–47)
HGB BLD-MCNC: 15.3 G/DL (ref 12–16)
IMM GRANULOCYTES # BLD AUTO: 0.03 K/UL (ref 0–0.11)
IMM GRANULOCYTES NFR BLD AUTO: 0.3 % (ref 0–0.9)
LYMPHOCYTES # BLD AUTO: 3.35 K/UL (ref 1–4.8)
LYMPHOCYTES NFR BLD: 32.3 % (ref 22–41)
MCH RBC QN AUTO: 32.4 PG (ref 27–33)
MCHC RBC AUTO-ENTMCNC: 33.6 G/DL (ref 33.6–35)
MCV RBC AUTO: 96.4 FL (ref 81.4–97.8)
MONOCYTES # BLD AUTO: 0.73 K/UL (ref 0–0.85)
MONOCYTES NFR BLD AUTO: 7 % (ref 0–13.4)
NEUTROPHILS # BLD AUTO: 5.93 K/UL (ref 2–7.15)
NEUTROPHILS NFR BLD: 57.2 % (ref 44–72)
NRBC # BLD AUTO: 0 K/UL
NRBC BLD-RTO: 0 /100 WBC
PLATELET # BLD AUTO: 293 K/UL (ref 164–446)
PMV BLD AUTO: 11.1 FL (ref 9–12.9)
POTASSIUM SERPL-SCNC: 4.4 MMOL/L (ref 3.6–5.5)
RBC # BLD AUTO: 4.72 M/UL (ref 4.2–5.4)
SODIUM SERPL-SCNC: 139 MMOL/L (ref 135–145)
WBC # BLD AUTO: 10.4 K/UL (ref 4.8–10.8)

## 2018-11-28 PROCEDURE — 36415 COLL VENOUS BLD VENIPUNCTURE: CPT

## 2018-11-28 PROCEDURE — 85025 COMPLETE CBC W/AUTO DIFF WBC: CPT

## 2018-11-28 PROCEDURE — 80048 BASIC METABOLIC PNL TOTAL CA: CPT

## 2018-11-28 PROCEDURE — 99214 OFFICE O/P EST MOD 30 MIN: CPT | Performed by: FAMILY MEDICINE

## 2018-11-28 RX ORDER — IPRATROPIUM BROMIDE 21 UG/1
2 SPRAY, METERED NASAL EVERY 12 HOURS
Qty: 1 BOTTLE | Refills: 1 | Status: SHIPPED | OUTPATIENT
Start: 2018-11-28 | End: 2019-06-26 | Stop reason: SDUPTHER

## 2018-11-28 RX ORDER — BACITRACIN ZINC AND POLYMYXIN B SULFATE 500; 1000 [USP'U]/G; [USP'U]/G
1 OINTMENT TOPICAL 2 TIMES DAILY
Qty: 1 TUBE | Refills: 0 | Status: SHIPPED | OUTPATIENT
Start: 2018-11-28 | End: 2019-02-19

## 2018-11-28 NOTE — PROGRESS NOTES
OP Telephone Anticoagulation Service Note    Date: 11/27/2018      Anticoagulation Summary  As of 11/27/2018    INR goal:   2.0-3.0   TTR:   73.1 % (3.4 y)   Today's INR:   2.8   Warfarin maintenance plan:   3.75 mg (2.5 mg x 1.5) on Mon; 2.5 mg (2.5 mg x 1) all other days   Weekly warfarin total:   18.75 mg   Plan last modified:   Ivone Barraza, PharmD (5/8/2018)   Next INR check:      Target end date:   Indefinite    Indications    Atrial fibrillation (HCC) (Resolved) [I48.91]  Chronic anticoagulation [Z79.01]             Anticoagulation Episode Summary     INR check location:   Home Draw    Preferred lab:       Send INR reminders to:       Comments:   Winston YEAGER      Anticoagulation Care Providers     Provider Role Specialty Phone number    Lizzy Haywood M.D. Referring Cardiology [phone]    [facility] Anticoagulation Services Responsible  [phone]    Vladimir Taylor, PharmD Responsible          Anticoagulation Patient Findings      INR therapeutic at 2.8.  Spoke w/ pt on phone.  Verified regimen w/ pt.  Pt to continue on w/ current regimen.  NO s/s bleeding reported per pt.  NO changes in diet reported per pt.  NO changes in medications reported per pt.  Check INR in 2 week(s).  Instructed pt to call clinic at [phone] if there are any questions.  Pt stated understanding.    Joe Kirby, Pharmacy Intern        I have reviewed and concur with the above plan     Fran Leyva, PharmD

## 2018-11-29 NOTE — PROGRESS NOTES
"Patient comes in with several issues.  She has had diarrhea for several months.  There is no blood in her stools.  She has no fever no chills.  No one else has diarrhea at home.  She wants to get stool tests about this diarrhea.  She has tried probiotics and they have not helped.  She has developed a vaginal yeast infection and needs a refill of terconazole cream which is been helping her.  She has some sores in the superior part of her buttocks and she is worried about shingles.  She has had shingles there in the past.  She complains of a clear runny nose and wonders what it could be.    I reviewed the following    Past Medical History:   Diagnosis Date   • A-fib ([facility])    • Anesthesia     \"Tachycardia for 5 days after cataract surgery\"   • Anticoagulant long-term use 1/12/2012   • Arthritis     Knees, hips   • Asthma     with pneumonia, nothing for 3 years   • Atrial fibrillation (HCC)    • Backpain    • Breath shortness     with exertion O2  2l/m PRN, hasnt used for 3 years   • Bronchitis Nov, 2013   • CAD (coronary artery disease)    • Depression    • Glaucoma 5/3/2011   • Hematoma complicating a procedure 11/3/2012   • High cholesterol    • Hypertension    • Hypothyroid    • Lupus    • Macular degeneration    • Menopause 1/12/2012   • Mitral regurgitation 10/30/2012   • Obesity 1/12/2012   • Pneumonia feb,2013   • Pre-syncope 6/29/2018   • Pulmonary hypertension (HCC) 10/30/2012   • PVC's (premature ventricular contractions) 1/12/2012   • Senile nuclear sclerosis    • Spinal stenosis of lumbar region at multiple levels    • Unspecified cataract     repaired bilateral   • Unspecified urinary incontinence         Past Surgical History:   Procedure Laterality Date   • OTHER CARDIAC SURGERY  07/19/2018    Cardiac Ablation   • PACEMAKER INSERTION  06/30/2018    Dual Chamber   • KNEE ARTHROPLASTY TOTAL Right 6/23/2016    Procedure: KNEE ARTHROPLASTY TOTAL;  Surgeon: Heriberto Lozada M.D.;  Location: SURGERY [facility]" "ORS;  Service:    • KNEE ARTHROPLASTY TOTAL Left 5/28/2015    Procedure: KNEE ARTHROPLASTY TOTAL;  Surgeon: Heriberto Lozada M.D.;  Location: SURGERY [facility] ORS;  Service:    • CATARACT PHACO WITH IOL Right 5/5/2015    Procedure: IOL OD - STANDARD;  Surgeon: Dmitry Bejarano M.D.;  Location: SURGERY [facility] ORS;  Service:    • CATARACT PHACO WITH IOL  4/21/2015    Performed by Dmitry Bejarano M.D. at [facility] ORS   • RECOVERY  11/30/2010    Performed by SURGERY, [facility]-RECOVERY at [facility] SAME DAY [facility] ORS   • COLONOSCOPY  2008    Normal    [facility]   • ABDOMINAL HYSTERECTOMY TOTAL  April 15,1975    still has ovaries   • OPEN REDUCTION      left ankle   • OTHER      Removed pins from left ankle   • TONSILLECTOMY AND ADENOIDECTOMY         Allergies   Allergen Reactions   • Amiodarone Hives     Throat and tongue itching   • Bactrim Shortness of Breath   • Claritin-D [Loratadine-Pseudoephedrine] Palpitations   • Metoprolol      Causes throat swelling   • Morphine      Hallucinations   • Qvar [Beclomethasone Dipropionate]      Pressure on heart     • Vibramycin Shortness of Breath   • Atorvastatin Calcium-Polysorbate 80      Muscle aches     • Augmentin      Unknown reaction   • Cipro Xr Swelling   • Diltiazem      rash   • Flecainide      dizziness   • Keflex Unspecified     Pt states \"Unsure\".   • Mucinex      GI Distress     • Tramadol      crying   • Phytoplex Z-Guard [Petrolatum-Zinc Oxide]      \"causes burning\"   • Atorvastatin Myalgia   • Tape Rash     Paper tape okay       Current Outpatient Prescriptions   Medication Sig Dispense Refill   • Probiotic Product (PROBIOTIC DAILY PO) Take  by mouth.     • PREBIOTIC PRODUCT PO Take  by mouth.     • terconazole (TERAZOL 3) 0.8 % vaginal cream Apply 2 times a day to yeast infection 20 g 1   • bacitracin-polymyxin b (POLYSPORIN) 500-89223 UNIT/GM Ointment Apply 1 Each to affected area(s) 2 times a day. 1 Tube 0   • ipratropium (ATROVENT) " 0.03 % Solution Spray 2 Sprays in nose every 12 hours. 1 Bottle 1   • sertraline (ZOLOFT) 50 MG Tab Take 1 Tab by mouth every day. 90 Tab 3   • spironolactone (ALDACTONE) 50 MG Tab TAKE  ONE TABLET BY MOUTH EVERY MORNING 90 Tab 3   • Lutein 20 MG Cap Take 1 Cap by mouth every day. 90 Cap 3   • Mirabegron ER 25 MG TABLET SR 24 HR Take 1 Tab by mouth every day. 30 Tab 3   • atenolol (TENORMIN) 50 MG Tab Take 1 Tab by mouth 2 times a day. 180 Tab 1   • furosemide (LASIX) 40 MG Tab Take 2 Tabs by mouth every day. 80 mg in the  Tab 3   • warfarin (COUMADIN) 2.5 MG Tab TAKE 1 TO 1.5 TABLETS BY MOUTH ONCE DAILY AS DIRECTED BY THE COUMADIN CLINIC 135 Tab 1   • levothyroxine (SYNTHROID) 50 MCG Tab TAKE 1 TABLET BY MOUTH EVERY MORNING ON AN EMPTY STOMACH. 90 Tab 3   • raNITidine (ZANTAC) 150 MG Tab TAKE ONE TABLET BY MOUTH TWICE DAILY 180 Tab 3   • lisinopril (PRINIVIL) 5 MG Tab TAKE ONE TABLET BY MOUTH EVERY DAY 90 Tab 3   • estradiol (ESTRACE) 2 MG Tab TAKE ONE TABLET BY MOUTH EVERY DAY 90 Tab 3   • magnesium oxide (MAG-OX) 400 (241.3 Mg) MG Tab tablet Take 1 Tab by mouth every day. 30 Tab 6   • vitamin D (CHOLECALCIFEROL) 1000 UNIT TABS Take 2,000 Units by mouth every day.     • acetaminophen (TYLENOL) 500 MG TABS Take 1,000 mg by mouth 3 times a day. Indications: Pain       Current Facility-Administered Medications   Medication Dose Route Frequency Provider Last Rate Last Dose   • sertraline (ZOLOFT) tablet 50 mg  50 mg Oral DAILY Lizzy Haywood M.D.            Family History   Problem Relation Age of Onset   • Stroke Mother    • Diabetes Father    • Stroke Sister    • Heart Disease Brother    • Stroke Sister    • GI Daughter         Crohn's Disease   • Heart Disease Daughter         CHF   • Other Daughter         Chronic Pain--Lymphedema   • Cancer Paternal Aunt        Social History     Social History   • Marital status:      Spouse name: N/A   • Number of children: N/A   • Years of education: N/A      Occupational History   • Not on file.     Social History Main Topics   • Smoking status: Never Smoker   • Smokeless tobacco: Never Used   • Alcohol use No   • Drug use: No   • Sexual activity: Not Currently     Partners: Male     Birth control/ protection: Post-Menopausal     Other Topics Concern   • Not on file     Social History Narrative   • No narrative on file        Physical Exam   Constitutional: She is oriented. She appears well-developed and well-nourished. No distress.   HENT:  Head: Normocephalic and atraumatic.   Right Ear: External ear normal. Ear canal and TM normal   Left Ear: External ear normal. Ear canal and TM normal  Nose: Allergic rhinitis  Mouth/Throat: Oropharynx is clear and moist.   Eyes: Conjunctivae and extraocular motions are normal. Pupils are equal, round, and reactive to light. Fundi benign bilaterally   Neck: No thyromegaly present.   Cardiovascular: Normal rate, regular rhythm, normal heart sounds and intact distal pulses.  Exam reveals no gallop.    No murmur heard.  Pulmonary/Chest: Effort normal and breath sounds normal. No respiratory distress. She has no wheezes. She has no rales.   Abdominal: Soft. Bowel sounds are normal. No hepatosplenomegaly She exhibits no distension. No tenderness. She has no rebound and no guarding.   Musculoskeletal: Normal range of motion. She exhibits no edema and no tenderness.   Lymphadenopathy:     She has no cervical adenopathy.   No supraclavicular adenopathy  Neurological: She is alert and oriented. She has normal reflexes.        Babinskis downgoing bilaterally   Skin: Just superior to the patient's buttocks the patient has what appears to be cellulitis.  It does not appear to be shingles.  It crosses the midline.  A female MA was present for this exam.  Skin is warm and dry. No rash noted. No erythema.   Psychiatric: She has a normal mood and appropriate affect. Her behavior is normal. Judgment and thought content normal.     1. Diarrhea,  unspecified type--etiology not clear CRYPTO/GIARDIA RAPID ASSAY    CULTURE STOOL    CDIFF BY PCR    CBC WITH DIFFERENTIAL    BASIC METABOLIC PANEL   2. Mixed hyperlipidemia   doing well   3. Tinea  terconazole (TERAZOL 3) 0.8 % vaginal cream--use twice daily   4. Seasonal allergic rhinitis due to pollen  ipratropium (ATROVENT) 0.03 % Solution--2 puffs and nose twice daily   5. Cellulitis of buttock  bacitracin-polymyxin b (POLYSPORIN) [id number] UNIT/GM Ointment--patient was given some small tubes of this from our office to be used twice daily     Please note that this dictation was created using voice recognition software. I have worked with consultants from the vendor as well as technical experts from [employer] to optimize the interface. I have made every reasonable attempt to correct obvious errors, but I expect that there are errors of grammar and possibly content that I did not discover before finalizing the note.    Recheck 2 months or as needed

## 2018-12-02 ENCOUNTER — HOSPITAL ENCOUNTER (OUTPATIENT)
Facility: MEDICAL CENTER | Age: 78
End: 2018-12-02
Attending: FAMILY MEDICINE
Payer: MEDICARE

## 2018-12-02 PROCEDURE — 87328 CRYPTOSPORIDIUM AG IA: CPT

## 2018-12-02 PROCEDURE — 87329 GIARDIA AG IA: CPT

## 2018-12-02 PROCEDURE — 87045 FECES CULTURE AEROBIC BACT: CPT

## 2018-12-02 PROCEDURE — 87899 AGENT NOS ASSAY W/OPTIC: CPT

## 2018-12-02 PROCEDURE — 87046 STOOL CULTR AEROBIC BACT EA: CPT

## 2018-12-02 PROCEDURE — 87493 C DIFF AMPLIFIED PROBE: CPT

## 2018-12-03 DIAGNOSIS — R19.7 DIARRHEA, UNSPECIFIED TYPE: ICD-10-CM

## 2018-12-03 LAB
C DIFF DNA SPEC QL NAA+PROBE: NEGATIVE
C DIFF TOX GENS STL QL NAA+PROBE: NEGATIVE
G LAMBLIA+C PARVUM AG STL QL RAPID: NORMAL
SIGNIFICANT IND 70042: NORMAL
SITE SITE: NORMAL
SOURCE SOURCE: NORMAL

## 2018-12-04 LAB
E COLI SXT1+2 STL IA: NORMAL
SIGNIFICANT IND 70042: NORMAL
SITE SITE: NORMAL
SOURCE SOURCE: NORMAL

## 2018-12-05 ENCOUNTER — OFFICE VISIT (OUTPATIENT)
Dept: MEDICAL GROUP | Facility: PHYSICIAN GROUP | Age: 78
End: 2018-12-05
Payer: MEDICARE

## 2018-12-05 VITALS
BODY MASS INDEX: 31.41 KG/M2 | SYSTOLIC BLOOD PRESSURE: 130 MMHG | WEIGHT: 184 LBS | DIASTOLIC BLOOD PRESSURE: 62 MMHG | HEIGHT: 64 IN | TEMPERATURE: 97.8 F | OXYGEN SATURATION: 98 % | HEART RATE: 88 BPM

## 2018-12-05 DIAGNOSIS — M17.0 PRIMARY OSTEOARTHRITIS OF BOTH KNEES: ICD-10-CM

## 2018-12-05 DIAGNOSIS — M32.9 SYSTEMIC LUPUS ERYTHEMATOSUS, UNSPECIFIED SLE TYPE, UNSPECIFIED ORGAN INVOLVEMENT STATUS (HCC): ICD-10-CM

## 2018-12-05 DIAGNOSIS — E78.2 MIXED HYPERLIPIDEMIA: ICD-10-CM

## 2018-12-05 DIAGNOSIS — F32.0 CURRENT MILD EPISODE OF MAJOR DEPRESSIVE DISORDER WITHOUT PRIOR EPISODE (HCC): ICD-10-CM

## 2018-12-05 DIAGNOSIS — Z95.0 CARDIAC PACEMAKER IN SITU: ICD-10-CM

## 2018-12-05 DIAGNOSIS — I25.10 CORONARY ARTERY DISEASE INVOLVING NATIVE CORONARY ARTERY OF NATIVE HEART WITHOUT ANGINA PECTORIS: ICD-10-CM

## 2018-12-05 DIAGNOSIS — I48.19 PERSISTENT ATRIAL FIBRILLATION (HCC): ICD-10-CM

## 2018-12-05 PROCEDURE — G0439 PPPS, SUBSEQ VISIT: HCPCS | Performed by: FAMILY MEDICINE

## 2018-12-05 ASSESSMENT — ACTIVITIES OF DAILY LIVING (ADL): BATHING_REQUIRES_ASSISTANCE: 0

## 2018-12-05 ASSESSMENT — PATIENT HEALTH QUESTIONNAIRE - PHQ9: CLINICAL INTERPRETATION OF PHQ2 SCORE: 0

## 2018-12-05 ASSESSMENT — ENCOUNTER SYMPTOMS: GENERAL WELL-BEING: GOOD

## 2018-12-05 NOTE — PROGRESS NOTES
"Chief Complaint   Patient presents with   • Annual Wellness Visit         HPI:  Donte is a 78 y.o. here for Medicare Annual Wellness Visit--patient basically feels well.  I had seen her earlier for some diarrhea and we are still waiting for the results of her stool test.  She has no chest pains and no shortness of breath.  The computer had said she was due for colonoscopy but the patient says \"I am too old for that\".  I do not want a colonoscopy.  She does not have blood in her stool.    Patient Active Problem List    Diagnosis Date Noted   • Persistent atrial fibrillation (HCC) 10/31/2017     Priority: High   • Multiple drug allergies 06/29/2018     Priority: Medium   • Obesity (BMI 30-39.9) 03/08/2017     Priority: Medium   • Degenerative arthritis of knee, bilateral 06/23/2016     Priority: Low   • Depression 07/30/2015     Priority: Low   • CAD (coronary artery disease) 10/07/2014     Priority: Low   • Hypothyroidism 05/21/2014     Priority: Low   • Chronic anticoagulation 11/14/2013     Priority: Low   • 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/02/2013     Priority: Low   • Spinal stenosis, multilevel 07/02/2013     Priority: Low   • Glaucoma 05/03/2011     Priority: Low   • Macular degeneration 05/03/2011     Priority: Low   • Lupus (systemic lupus erythematosus) (HCC) 10/23/2009     Priority: Low   • Mixed hyperlipidemia 10/23/2009     Priority: Low   • Cardiac pacemaker in situ 10/17/2016       Current Outpatient Prescriptions   Medication Sig Dispense Refill   • Probiotic Product (PROBIOTIC DAILY PO) Take  by mouth.     • PREBIOTIC PRODUCT PO Take  by mouth.     • terconazole (TERAZOL 3) 0.8 % vaginal cream Apply 2 times a day to yeast infection 20 g 1   • bacitracin-polymyxin b (POLYSPORIN) 500-43540 UNIT/GM Ointment Apply 1 Each to affected area(s) 2 times a day. 1 Tube 0   • ipratropium (ATROVENT) 0.03 % Solution Spray 2 Sprays in nose every 12 hours. 1 Bottle 1   • sertraline (ZOLOFT) 50 MG Tab Take 1 Tab " by mouth every day. 90 Tab 3   • spironolactone (ALDACTONE) 50 MG Tab TAKE  ONE TABLET BY MOUTH EVERY MORNING 90 Tab 3   • Lutein 20 MG Cap Take 1 Cap by mouth every day. 90 Cap 3   • Mirabegron ER 25 MG TABLET SR 24 HR Take 1 Tab by mouth every day. 30 Tab 3   • atenolol (TENORMIN) 50 MG Tab Take 1 Tab by mouth 2 times a day. 180 Tab 1   • furosemide (LASIX) 40 MG Tab Take 2 Tabs by mouth every day. 80 mg in the  Tab 3   • warfarin (COUMADIN) 2.5 MG Tab TAKE 1 TO 1.5 TABLETS BY MOUTH ONCE DAILY AS DIRECTED BY THE COUMADIN CLINIC 135 Tab 1   • levothyroxine (SYNTHROID) 50 MCG Tab TAKE 1 TABLET BY MOUTH EVERY MORNING ON AN EMPTY STOMACH. 90 Tab 3   • raNITidine (ZANTAC) 150 MG Tab TAKE ONE TABLET BY MOUTH TWICE DAILY 180 Tab 3   • lisinopril (PRINIVIL) 5 MG Tab TAKE ONE TABLET BY MOUTH EVERY DAY 90 Tab 3   • estradiol (ESTRACE) 2 MG Tab TAKE ONE TABLET BY MOUTH EVERY DAY 90 Tab 3   • magnesium oxide (MAG-OX) 400 (241.3 Mg) MG Tab tablet Take 1 Tab by mouth every day. 30 Tab 6   • acetaminophen (TYLENOL) 500 MG TABS Take 1,000 mg by mouth 3 times a day. Indications: Pain     • vitamin D (CHOLECALCIFEROL) 1000 UNIT TABS Take 2,000 Units by mouth every day.       Current Facility-Administered Medications   Medication Dose Route Frequency Provider Last Rate Last Dose   • sertraline (ZOLOFT) tablet 50 mg  50 mg Oral DAILY Lizzy Haywood M.D.            Patient is taking medications as noted in medication list.  Current supplements as per medication list.     Allergies: Amiodarone; Bactrim; Claritin-d [loratadine-pseudoephedrine]; Metoprolol; Morphine; Qvar [beclomethasone dipropionate]; Vibramycin; Atorvastatin calcium-polysorbate 80; Augmentin; Cipro xr; Diltiazem; Flecainide; Keflex; Mucinex; Tramadol; Phytoplex z-guard [petrolatum-zinc oxide]; Atorvastatin; and Tape    Current social contact/activities: spends time with family and friends, shopping     Is patient current with immunizations? No, due for  SHINGRIX (Shingles). Patient is interested in receiving NONE today.    She  reports that she has never smoked. She has never used smokeless tobacco. She reports that she does not drink alcohol or use drugs.  Counseling given: Not Answered        DPA/Advanced directive: Patient does not have an Advanced Directive.  A packet and workshop information was given on Advanced Directives.    ROS:    Gait: Uses a walker   Ostomy: No   Other tubes: No   Amputations: No   Chronic oxygen use No   Last eye exam 2018   Wears hearing aids: No   : Reports urinary leakage during the last 6 months that has interfered a lot with their daily activities or sleep.      Screening:    Depression Screening    Little interest or pleasure in doing things?  0 - not at all  Feeling down, depressed, or hopeless? 0 - not at all  Patient Health Questionnaire Score: 0    If depressive symptoms identified deferred to follow up visit unless specifically addressed in assessment and plan.    Interpretation of PHQ-9 Total Score   Score Severity   1-4 No Depression   5-9 Mild Depression   10-14 Moderate Depression   15-19 Moderately Severe Depression   20-27 Severe Depression    Screening for Cognitive Impairment    Three Minute Recall (leader, season, table)  3/3 Leader, season, table  Dexter clock face with all 12 numbers and set the hands to show 10 past 11.  Yes 3 /5 time 11:10  If cognitive concerns identified, deferred for follow up unless specifically addressed in assessment and plan.    Fall Risk Assessment    Has the patient had two or more falls in the last year or any fall with injury in the last year?  No  If fall risk identified, deferred for follow up unless specifically addressed in assessment and plan.    Safety Assessment    Throw rugs on floor.  No  Handrails on all stairs.  Yes  Good lighting in all hallways.  Yes  Difficulty hearing.  No  Patient counseled about all safety risks that were identified.    Functional Assessment  ADLs    Are there any barriers preventing you from cooking for yourself or meeting nutritional needs?  No.    Are there any barriers preventing you from driving safely or obtaining transportation?  No.    Are there any barriers preventing you from using a telephone or calling for help?  No.    Are there any barriers preventing you from shopping?  No.    Are there any barriers preventing you from taking care of your own finances?  No.    Are there any barriers preventing you from managing your medications?  No.    Are there any barriers preventing you from showering, bathing or dressing yourself?  No.    Are you currently engaging in any exercise or physical activity?  No.     What is your perception of your health?  Good.    Health Maintenance Summary                Annual Wellness Visit Overdue 1940     IMM ZOSTER VACCINES Overdue 11/16/1988     COLONOSCOPY Overdue 9/29/2015      Done 9/29/2005 AMB REFERRAL TO GI FOR COLONOSCOPY    MAMMOGRAM Next Due 2/5/2019      Done 2/5/2018 MA-MAMMO SCREENING BILAT W/TOMOSYNTHESIS W/CAD     Patient has more history with this topic...    IMM DTaP/Tdap/Td Vaccine Next Due 5/15/2022      Done 5/15/2012 Imm Admin: Tdap Vaccine     Patient has more history with this topic...    BONE DENSITY Next Due 8/9/2022      Done 8/9/2017 DS-BONE DENSITY STUDY (DEXA)     Patient has more history with this topic...          Patient Care Team:  Kishore Price M.D. as PCP - [facility]  Dmitry Bejarano M.D. as Consulting Physician (Ophthalmology)  Lizzy Haywood M.D. as Consulting Physician (Cardiology)  Rad Clement M.D. as Consulting Physician (Neurosurgery)  Dmitry Mcknight M.D. as Consulting Physician (Urology)  VICTORIANO Winslow as Consulting Physician (Cardiology)  Heriberto Lozada M.D. as Consulting Physician (Orthopaedics)    Social History   Substance Use Topics   • Smoking status: Never Smoker   • Smokeless tobacco: Never Used    • Alcohol use No     Family History   Problem Relation Age of Onset   • Stroke Mother    • Diabetes Father    • Stroke Sister    • Heart Disease Brother    • Stroke Sister    • GI Daughter         Crohn's Disease   • Heart Disease Daughter         CHF   • Other Daughter         Chronic Pain--Lymphedema   • Cancer Paternal Aunt      She  has a past medical history of A-fib (HCC); Anesthesia; Anticoagulant long-term use (1/12/2012); Arthritis; Asthma; Atrial fibrillation (HCC); Backpain; Breath shortness; Bronchitis (Nov, 2013); CAD (coronary artery disease); Depression; Glaucoma (5/3/2011); Hematoma complicating a procedure (11/3/2012); High cholesterol; Hypertension; Hypothyroid; Lupus; Macular degeneration; Menopause (1/12/2012); Mitral regurgitation (10/30/2012); Obesity (1/12/2012); Pneumonia (feb,2013); Pre-syncope (6/29/2018); Pulmonary hypertension (HCC) (10/30/2012); PVC's (premature ventricular contractions) (1/12/2012); Senile nuclear sclerosis; Spinal stenosis of lumbar region at multiple levels; Unspecified cataract; and Unspecified urinary incontinence.   Past Surgical History:   Procedure Laterality Date   • OTHER CARDIAC SURGERY  07/19/2018    Cardiac Ablation   • PACEMAKER INSERTION  06/30/2018    Dual Chamber   • KNEE ARTHROPLASTY TOTAL Right 6/23/2016    Procedure: KNEE ARTHROPLASTY TOTAL;  Surgeon: Heriberto Lozada M.D.;  Location: [facility];  Service:    • KNEE ARTHROPLASTY TOTAL Left 5/28/2015    Procedure: KNEE ARTHROPLASTY TOTAL;  Surgeon: Heriberto Lozada M.D.;  Location: [facility];  Service:    • CATARACT PHACO WITH IOL Right 5/5/2015    Procedure: IOL OD - STANDARD;  Surgeon: Dmitry Bejarano M.D.;  Location: [facility];  Service:    • CATARACT PHACO WITH IOL  4/21/2015    Performed by Dmitry Bejarano M.D. at [facility]   • RECOVERY  11/30/2010    Performed by SURGERY, CATH-RECOVERY at SURGERY SAME DAY [facility]   • COLONOSCOPY   "2008    Normal    [facility]   • ABDOMINAL HYSTERECTOMY TOTAL  April 15,1975    still has ovaries   • OPEN REDUCTION      left ankle   • OTHER      Removed pins from left ankle   • TONSILLECTOMY AND ADENOIDECTOMY             Exam:     Blood pressure 130/62, pulse 88, temperature 36.6 °C (97.8 °F), height 1.626 m (5' 4\"), weight 83.5 kg (184 lb), last menstrual period 01/01/1993, SpO2 98 %, not currently breastfeeding. Body mass index is 31.58 kg/m².    Hearing good.    Dentition good  Alert, oriented in no acute distress.  Eye contact is good, speech goal directed, affect calm  Physical Exam   Constitutional: She is oriented. She appears well-developed and well-nourished. No distress.   HENT:  Head: Normocephalic and atraumatic.   Right Ear: External ear normal. Ear canal and TM normal   Left Ear: External ear normal. Ear canal and TM normal  Nose: Nose normal.   Mouth/Throat: Oropharynx is clear and moist.   Eyes: Conjunctivae and extraocular motions are normal. Pupils are equal, round, and reactive to light. Fundi benign bilaterally   Neck: No thyromegaly present.   Cardiovascular: Normal rate, regular rhythm, normal heart sounds and intact distal pulses.  Exam reveals no gallop.  She is not fibrillating today.  No murmur heard.  Pulmonary/Chest: Effort normal and breath sounds normal. No respiratory distress. She has no wheezes. She has no rales.   Abdominal: Soft. Bowel sounds are normal. No hepatosplenomegaly She exhibits no distension. No tenderness. She has no rebound and no guarding.   Musculoskeletal: Normal range of motion. She exhibits no edema and no tenderness.   Lymphadenopathy:     She has no cervical adenopathy.   No supraclavicular adenopathy  Neurological: She is alert and oriented. She has normal reflexes.        Babinskis downgoing bilaterally   Skin: The cellulitis on her upper buttocks has improved.  A female MA chaperone was present when I checked this-- skin is warm and dry. No rash " noted. No erythema.   Psychiatric: She has a normal mood and appropriate affect. Her behavior is normal. Judgment and thought content normal.    Assessment and Plan. The following treatment and monitoring plan is recommended:    1. Persistent atrial fibrillation (HCC)   patient is actually not fibrillating today.   2. Primary osteoarthritis of both knees   stable   3. Current mild episode of major depressive disorder without prior episode (HCC)   stable   4. Systemic lupus erythematosus, unspecified SLE type, unspecified organ involvement status (HCC)   stable   5. Mixed hyperlipidemia   doing well   6. Cardiac pacemaker in situ   doing well   7. Coronary artery disease involving native coronary artery of native heart without angina pectoris   doing well   8.  Cellulitis on skin above buttocks--- improving        Services suggested: No services needed at this time  Health Care Screening recommendations as per orders if indicated.  Referrals offered: PT/OT/Nutrition counseling/Behavioral Health/Smoking cessation as per orders if indicated.    Discussion today about general wellness and lifestyle habits:    · Prevent falls and reduce trip hazards; Cautioned about securing or removing rugs.  · Have a working fire alarm and carbon monoxide detector;   · Engage in regular physical activity and social activities       Follow-up: Recheck 1 year or as needed     Please note that this dictation was created using voice recognition software. I have worked with consultants from the vendor as well as technical experts from [employer] to optimize the interface. I have made every reasonable attempt to correct obvious errors, but I expect that there are errors of grammar and possibly content that I did not discover before finalizing the note.

## 2018-12-06 LAB
BACTERIA STL CULT: NORMAL
E COLI SXT1+2 STL IA: NORMAL
SIGNIFICANT IND 70042: NORMAL
SITE SITE: NORMAL
SOURCE SOURCE: NORMAL

## 2018-12-11 ENCOUNTER — ANTICOAGULATION MONITORING (OUTPATIENT)
Dept: VASCULAR LAB | Facility: MEDICAL CENTER | Age: 78
End: 2018-12-11

## 2018-12-11 DIAGNOSIS — Z79.01 CHRONIC ANTICOAGULATION: ICD-10-CM

## 2018-12-11 LAB — INR PPP: 2.1 (ref 2–3.5)

## 2018-12-12 DIAGNOSIS — N32.89 BLADDER INSTABILITY: ICD-10-CM

## 2018-12-12 NOTE — PROGRESS NOTES
Anticoagulation Summary  As of 12/11/2018    INR goal:   2.0-3.0   TTR:   73.4 % (3.5 y)   Today's INR:   2.1   Warfarin maintenance plan:   3.75 mg (2.5 mg x 1.5) on Mon; 2.5 mg (2.5 mg x 1) all other days   Weekly warfarin total:   18.75 mg   Plan last modified:   Ivone Barraza PharmD (5/8/2018)   Next INR check:   12/25/2018   Target end date:   Indefinite    Indications    Atrial fibrillation (HCC) (Resolved) [I48.91]  Chronic anticoagulation [Z79.01]             Anticoagulation Episode Summary     INR check location:   Home Draw    Preferred lab:       Send INR reminders to:       Comments:   Winston YEAGER      Anticoagulation Care Providers     Provider Role Specialty Phone number    Lizzy Haywood M.D. Referring Cardiology [phone]    [facility] Responsible  [phone]    Vladimir Taylor, PharmD Responsible          Anticoagulation Patient Findings  Patient Findings     Negatives:   Signs/symptoms of thrombosis, Signs/symptoms of bleeding, Laboratory test error suspected, Change in health, Change in alcohol use, Change in activity, Upcoming invasive procedure, Emergency department visit, Upcoming dental procedure, Missed doses, Extra doses, Change in medications, Change in diet/appetite, Hospital admission, Bruising, Other complaints        Spoke with patient today regarding therapeutic INR of 2.1.  Patient denies any signs/symptoms of bruising or bleeding or any changes in diet and medications.  Instructed patient to call clinic with any questions or concerns.  Pt is to continue with current warfarin dosing regimen.  Follow up in 2 weeks, to reduce risk of adverse events related to this high risk medication,  Warfarin.    Vladimir Taylor, PeterD

## 2018-12-18 ENCOUNTER — TELEPHONE (OUTPATIENT)
Dept: CARDIOLOGY | Facility: MEDICAL CENTER | Age: 78
End: 2018-12-18

## 2018-12-18 NOTE — TELEPHONE ENCOUNTER
Pt in clinic today during his 's appt, pt handed clearance request from [facility]-Dr Juan C Mercedes for pt's upcoming Right Total Hip Arthoplasty.     Per Dr Haywood, pt is mild-moderate risk, recommend Cardiac Tele Monitoring post op    Clearance letter drafted w/ Dr Haywood recommendations faxed to [facility]-Dr Juan C Mercedes, F#[phone].     Copy of clearance request to scanning

## 2018-12-18 NOTE — LETTER
PROCEDURE/SURGERY CLEARANCE FORM      Encounter Date: 12/18/2018    Patient: Donte Magaña  YOB: 1940    CARDIOLOGIST:  Lizzy Haywood M.D.   REFERRING DOCTOR: Juan C Mercedes M.D.    Patient does have PPM.     The above patient is mild-moderate risk from cardiovascular standpoint to have the following procedure/surgery: Right Total Hip Arthoplasty                                           Additional comments: Recommend Cardiac Telemetry Monitoring post op.                        MD Signature Lizzy Haywood M.D.

## 2018-12-25 LAB — INR PPP: 2.2 (ref 2–3.5)

## 2018-12-26 ENCOUNTER — ANTICOAGULATION MONITORING (OUTPATIENT)
Dept: VASCULAR LAB | Facility: MEDICAL CENTER | Age: 78
End: 2018-12-26

## 2018-12-26 DIAGNOSIS — Z79.01 CHRONIC ANTICOAGULATION: ICD-10-CM

## 2019-01-02 DIAGNOSIS — R60.0 BILATERAL LOWER EXTREMITY EDEMA: ICD-10-CM

## 2019-01-02 RX ORDER — SPIRONOLACTONE 50 MG/1
TABLET, FILM COATED ORAL
Qty: 180 TAB | Refills: 3 | Status: SHIPPED | OUTPATIENT
Start: 2019-01-02 | End: 2020-01-20

## 2019-01-02 NOTE — TELEPHONE ENCOUNTER
ENEDELIA Mane/Karen       Patient called and said she was told clearance was not received at [facility]. She's asking you to call Ivone at [facility] at [phone], and afterwards to please call the patient at [phone].      Attempted to call Ivone from [facility], no answer, detailed vm left that clearance is faxed 2wks ago, will refax clearance and please call us back to confirm that they received it.     Clearance letter reprinted and refax, F#[phone].     Called pt and notified, she will call [facility] as well to follow up

## 2019-01-02 NOTE — TELEPHONE ENCOUNTER
Pt called back and confirmed she called Ivone from [facility] and she confirmed that they received the clearance

## 2019-01-04 ENCOUNTER — ANTICOAGULATION VISIT (OUTPATIENT)
Dept: VASCULAR LAB | Facility: MEDICAL CENTER | Age: 79
End: 2019-01-04
Attending: INTERNAL MEDICINE
Payer: MEDICARE

## 2019-01-04 VITALS — DIASTOLIC BLOOD PRESSURE: 48 MMHG | OXYGEN SATURATION: 94 % | SYSTOLIC BLOOD PRESSURE: 106 MMHG | HEART RATE: 82 BPM

## 2019-01-04 DIAGNOSIS — Z79.01 CHRONIC ANTICOAGULATION: ICD-10-CM

## 2019-01-04 DIAGNOSIS — Z01.810 PRE-OPERATIVE CARDIOVASCULAR EXAMINATION: ICD-10-CM

## 2019-01-04 DIAGNOSIS — Z01.812 PRE-OPERATIVE LABORATORY EXAMINATION: ICD-10-CM

## 2019-01-04 LAB
ANION GAP SERPL CALC-SCNC: 11 MMOL/L (ref 0–11.9)
BASOPHILS # BLD AUTO: 0.7 % (ref 0–1.8)
BASOPHILS # BLD: 0.07 K/UL (ref 0–0.12)
BUN SERPL-MCNC: 32 MG/DL (ref 8–22)
CALCIUM SERPL-MCNC: 9.9 MG/DL (ref 8.5–10.5)
CHLORIDE SERPL-SCNC: 103 MMOL/L (ref 96–112)
CO2 SERPL-SCNC: 24 MMOL/L (ref 20–33)
CREAT SERPL-MCNC: 1.18 MG/DL (ref 0.5–1.4)
EOSINOPHIL # BLD AUTO: 0.27 K/UL (ref 0–0.51)
EOSINOPHIL NFR BLD: 2.7 % (ref 0–6.9)
ERYTHROCYTE [DISTWIDTH] IN BLOOD BY AUTOMATED COUNT: 49.1 FL (ref 35.9–50)
GLUCOSE SERPL-MCNC: 153 MG/DL (ref 65–99)
HCT VFR BLD AUTO: 43.5 % (ref 37–47)
HGB BLD-MCNC: 14.3 G/DL (ref 12–16)
HIV 1+2 AB+HIV1 P24 AG SERPL QL IA: NON REACTIVE
IMM GRANULOCYTES # BLD AUTO: 0.04 K/UL (ref 0–0.11)
IMM GRANULOCYTES NFR BLD AUTO: 0.4 % (ref 0–0.9)
INR PPP: 1.8 (ref 2–3.5)
LYMPHOCYTES # BLD AUTO: 3.31 K/UL (ref 1–4.8)
LYMPHOCYTES NFR BLD: 32.5 % (ref 22–41)
MCH RBC QN AUTO: 32.5 PG (ref 27–33)
MCHC RBC AUTO-ENTMCNC: 32.9 G/DL (ref 33.6–35)
MCV RBC AUTO: 98.9 FL (ref 81.4–97.8)
MONOCYTES # BLD AUTO: 0.71 K/UL (ref 0–0.85)
MONOCYTES NFR BLD AUTO: 7 % (ref 0–13.4)
NEUTROPHILS # BLD AUTO: 5.78 K/UL (ref 2–7.15)
NEUTROPHILS NFR BLD: 56.7 % (ref 44–72)
NRBC # BLD AUTO: 0 K/UL
NRBC BLD-RTO: 0 /100 WBC
PLATELET # BLD AUTO: 262 K/UL (ref 164–446)
PMV BLD AUTO: 10.5 FL (ref 9–12.9)
POTASSIUM SERPL-SCNC: 4.1 MMOL/L (ref 3.6–5.5)
RBC # BLD AUTO: 4.4 M/UL (ref 4.2–5.4)
SCCMEC + MECA PNL NOSE NAA+PROBE: NEGATIVE
SCCMEC + MECA PNL NOSE NAA+PROBE: NEGATIVE
SODIUM SERPL-SCNC: 138 MMOL/L (ref 135–145)
WBC # BLD AUTO: 10.2 K/UL (ref 4.8–10.8)

## 2019-01-04 PROCEDURE — 93005 ELECTROCARDIOGRAM TRACING: CPT

## 2019-01-04 PROCEDURE — 80048 BASIC METABOLIC PNL TOTAL CA: CPT

## 2019-01-04 PROCEDURE — 85025 COMPLETE CBC W/AUTO DIFF WBC: CPT

## 2019-01-04 PROCEDURE — 87641 MR-STAPH DNA AMP PROBE: CPT

## 2019-01-04 PROCEDURE — 85610 PROTHROMBIN TIME: CPT

## 2019-01-04 PROCEDURE — 93010 ELECTROCARDIOGRAM REPORT: CPT | Performed by: INTERNAL MEDICINE

## 2019-01-04 PROCEDURE — 87389 HIV-1 AG W/HIV-1&-2 AB AG IA: CPT

## 2019-01-04 PROCEDURE — 87640 STAPH A DNA AMP PROBE: CPT

## 2019-01-04 PROCEDURE — 99213 OFFICE O/P EST LOW 20 MIN: CPT

## 2019-01-04 PROCEDURE — 36415 COLL VENOUS BLD VENIPUNCTURE: CPT

## 2019-01-04 NOTE — PROGRESS NOTES
Anticoagulation Summary  As of 1/4/2019    INR goal:   2.0-3.0   TTR:   73.5 % (3.5 y)   Today's INR:   1.8!   Warfarin maintenance plan:   3.75 mg (2.5 mg x 1.5) on Mon; 2.5 mg (2.5 mg x 1) all other days   Weekly warfarin total:   18.75 mg   Plan last modified:   Ivone Barraza, PharmD (5/8/2018)   Next INR check:   1/22/2019   Target end date:   Indefinite    Indications    Atrial fibrillation (HCC) (Resolved) [I48.91]  Chronic anticoagulation [Z79.01]             Anticoagulation Episode Summary     INR check location:   Home Draw    Preferred lab:       Send INR reminders to:       Comments:   Winston YEAGER      Anticoagulation Care Providers     Provider Role Specialty Phone number    Lizzy Haywood M.D. Referring Cardiology [phone]    [facility] Responsible  [phone]    Vladimir Taylor, PharmD Responsible          Anticoagulation Patient Findings  Patient Findings     Positives:   Upcoming invasive procedure (SOHAN on 1/17/19)    Negatives:   Signs/symptoms of thrombosis, Signs/symptoms of bleeding, Laboratory test error suspected, Change in health, Change in alcohol use, Change in activity, Emergency department visit, Upcoming dental procedure, Missed doses, Extra doses, Change in medications, Change in diet/appetite, Hospital admission, Bruising, Other complaints          HPI:  Donte Alicea Gómez seen in clinic today, on anticoagulation therapy with warfarin for AF  Changes to current medical/health status since last appt: none  Denies signs/symptoms of bleeding and/or thrombosis since the last appt.    Denies any interval changes to diet  Denies any interval changes to medications since last appt.   Denies any complications or cost restrictions with current therapy.   BP recorded in vitals.      A/P   INR  sub-therapeutic.   Instructed pt to bolus x1, then continue current regimen.    Gave pt bridging instructions for upcoming hip replacement.  Enoxaparin syringes ordered to pt's  preferred pharmacy.  Date    Warfarin AM lovenox 80 mg PM lovenox 80 mg   Saturday, January 12, 2019   0mg NONE NONE   Sunday, January 13, 2019   0mg Lovenox injection Lovenox injection   Monday, January 14, 2019   0mg Lovenox injection Lovenox injection   Tuesday, January 15, 2019   0mg Lovenox injection Lovenox injection   Wednesday, January 16, 2019   0mg NONE NONE   Thursday, January 17, 2019 Procedure 2.5 mg NONE NONE   Friday, January 18, 2019   2.5 mg Restart Lovenox injections Lovenox injection   Saturday, January 19, 2019   2.5 mg Lovenox injection Lovenox injection   Sunday, January 20, 2019   2.5 mg Lovenox injection Lovenox injection   Monday, January 21, 2019   3.75 mg Lovenox injection Lovenox injection   Tuesday, January 22, 2019     Lovenox injection GET INR checked     Follow up appointment in 3 week(s) after pt's procedure.    Patti Ponce, PeterD

## 2019-01-04 NOTE — DISCHARGE PLANNING
Per Aylin [facility], preadmissions, pt declined to speak with case management. She had a knee replacement in 2015 and 2016. Pt has fww, shower chair, cane and toilet seat riser. She has 7 stairs upper level and will be staying in the upper level. Her surgery serafni will be her . She is planning on attending the total joint class at the [facility] on 1/16/2019.

## 2019-01-05 LAB — EKG IMPRESSION: NORMAL

## 2019-01-05 NOTE — OR NURSING
"Spoke to Omayra/Dr Mercedes office at this time regarding pt's report she has skin breakdown on her buttocks/telma- area due to urinary incontinence;states using polysporin to site q day; states \"is getting better\". Per Omayra, will have Dr Mercedes advise.  "

## 2019-01-07 LAB — INR BLD: 1.8 (ref 0.9–1.2)

## 2019-01-08 ENCOUNTER — ANTICOAGULATION MONITORING (OUTPATIENT)
Dept: VASCULAR LAB | Facility: MEDICAL CENTER | Age: 79
End: 2019-01-08

## 2019-01-08 DIAGNOSIS — Z79.01 CHRONIC ANTICOAGULATION: ICD-10-CM

## 2019-01-08 LAB — INR PPP: 2.1 (ref 2–3.5)

## 2019-01-08 NOTE — PROGRESS NOTES
Anticoagulation Summary  As of 1/8/2019    INR goal:   2.0-3.0   TTR:   73.4 % (3.5 y)   Today's INR:   2.1   Warfarin maintenance plan:   3.75 mg (2.5 mg x 1.5) on Mon; 2.5 mg (2.5 mg x 1) all other days   Weekly warfarin total:   18.75 mg   No change documented:   Linus Raphael Ass't   Plan last modified:   Ivone Barraza, PharmD (5/8/2018)   Next INR check:      Target end date:   Indefinite    Indications    Atrial fibrillation (HCC) (Resolved) [I48.91]  Chronic anticoagulation [Z79.01]             Anticoagulation Episode Summary     INR check location:   Home Draw    Preferred lab:       Send INR reminders to:       Comments:   Winston YEAGER      Anticoagulation Care Providers     Provider Role Specialty Phone number    Lizzy Haywood M.D. Referring Cardiology [phone]    [facility] Responsible  [phone]    Vladimir Taylor, PharmD Responsible          Anticoagulation Patient Findings  Patient Findings     Negatives:   Signs/symptoms of thrombosis, Signs/symptoms of bleeding, Laboratory test error suspected, Change in health, Change in alcohol use, Change in activity, Upcoming invasive procedure, Emergency department visit, Upcoming dental procedure, Missed doses, Extra doses, Change in medications, Change in diet/appetite, Hospital admission, Bruising, Other complaints      Spoke with patient  to report a therapeutic INR.  Pt instructed to continue with current warfarin dosing regimen. Pt denies any s/s of bleeding, bruising, clotting or any changes to diet or medication.  Will follow up in 3 weeks.  Charity Riojas Med Ass't    I have reviewed and concur with the above plan on 01/08/2019.  Massiel Coleman, Clinical Pharmacist

## 2019-01-16 RX ORDER — CELECOXIB 200 MG/1
200 CAPSULE ORAL ONCE
Status: COMPLETED | OUTPATIENT
Start: 2019-01-17 | End: 2019-01-17

## 2019-01-16 RX ORDER — CEFAZOLIN SODIUM 2 G/100ML
2 INJECTION, SOLUTION INTRAVENOUS ONCE
Status: DISCONTINUED | OUTPATIENT
Start: 2019-01-17 | End: 2019-01-17 | Stop reason: HOSPADM

## 2019-01-16 RX ORDER — GABAPENTIN 300 MG/1
600 CAPSULE ORAL ONCE
Status: COMPLETED | OUTPATIENT
Start: 2019-01-17 | End: 2019-01-17

## 2019-01-16 RX ORDER — ACETAMINOPHEN 500 MG
500 TABLET ORAL ONCE
Status: COMPLETED | OUTPATIENT
Start: 2019-01-17 | End: 2019-01-17

## 2019-01-17 ENCOUNTER — APPOINTMENT (OUTPATIENT)
Dept: RADIOLOGY | Facility: MEDICAL CENTER | Age: 79
DRG: 470 | End: 2019-01-17
Attending: ORTHOPAEDIC SURGERY
Payer: MEDICARE

## 2019-01-17 ENCOUNTER — HOSPITAL ENCOUNTER (INPATIENT)
Facility: MEDICAL CENTER | Age: 79
LOS: 1 days | DRG: 470 | End: 2019-01-18
Attending: ORTHOPAEDIC SURGERY | Admitting: ORTHOPAEDIC SURGERY
Payer: MEDICARE

## 2019-01-17 DIAGNOSIS — Z96.641 S/P HIP REPLACEMENT, RIGHT: ICD-10-CM

## 2019-01-17 LAB
INR PPP: 1.16 (ref 0.87–1.13)
PROTHROMBIN TIME: 14.9 SEC (ref 12–14.6)

## 2019-01-17 PROCEDURE — 97162 PT EVAL MOD COMPLEX 30 MIN: CPT

## 2019-01-17 PROCEDURE — 700102 HCHG RX REV CODE 250 W/ 637 OVERRIDE(OP): Performed by: PHYSICIAN ASSISTANT

## 2019-01-17 PROCEDURE — 700101 HCHG RX REV CODE 250

## 2019-01-17 PROCEDURE — 700105 HCHG RX REV CODE 258: Performed by: ORTHOPAEDIC SURGERY

## 2019-01-17 PROCEDURE — 160048 HCHG OR STATISTICAL LEVEL 1-5: Performed by: ORTHOPAEDIC SURGERY

## 2019-01-17 PROCEDURE — A9270 NON-COVERED ITEM OR SERVICE: HCPCS | Performed by: STUDENT IN AN ORGANIZED HEALTH CARE EDUCATION/TRAINING PROGRAM

## 2019-01-17 PROCEDURE — 700101 HCHG RX REV CODE 250: Performed by: ORTHOPAEDIC SURGERY

## 2019-01-17 PROCEDURE — 502578 HCHG PACK, TOTAL HIP: Performed by: ORTHOPAEDIC SURGERY

## 2019-01-17 PROCEDURE — 160036 HCHG PACU - EA ADDL 30 MINS PHASE I: Performed by: ORTHOPAEDIC SURGERY

## 2019-01-17 PROCEDURE — 160009 HCHG ANES TIME/MIN: Performed by: ORTHOPAEDIC SURGERY

## 2019-01-17 PROCEDURE — 85610 PROTHROMBIN TIME: CPT

## 2019-01-17 PROCEDURE — 501838 HCHG SUTURE GENERAL: Performed by: ORTHOPAEDIC SURGERY

## 2019-01-17 PROCEDURE — 160031 HCHG SURGERY MINUTES - 1ST 30 MINS LEVEL 5: Performed by: ORTHOPAEDIC SURGERY

## 2019-01-17 PROCEDURE — 503339 HCHG DRESSSING PICO: Performed by: ORTHOPAEDIC SURGERY

## 2019-01-17 PROCEDURE — 500002 HCHG ADHESIVE, DERMABOND: Performed by: ORTHOPAEDIC SURGERY

## 2019-01-17 PROCEDURE — A6402 STERILE GAUZE <= 16 SQ IN: HCPCS | Performed by: ORTHOPAEDIC SURGERY

## 2019-01-17 PROCEDURE — 700105 HCHG RX REV CODE 258: Performed by: STUDENT IN AN ORGANIZED HEALTH CARE EDUCATION/TRAINING PROGRAM

## 2019-01-17 PROCEDURE — 502000 HCHG MISC OR IMPLANTS RC 0278: Performed by: ORTHOPAEDIC SURGERY

## 2019-01-17 PROCEDURE — 700102 HCHG RX REV CODE 250 W/ 637 OVERRIDE(OP): Performed by: STUDENT IN AN ORGANIZED HEALTH CARE EDUCATION/TRAINING PROGRAM

## 2019-01-17 PROCEDURE — 700111 HCHG RX REV CODE 636 W/ 250 OVERRIDE (IP): Performed by: ANESTHESIOLOGY

## 2019-01-17 PROCEDURE — A9272 DISP WOUND SUCT, DRSG/ACCESS: HCPCS | Performed by: STUDENT IN AN ORGANIZED HEALTH CARE EDUCATION/TRAINING PROGRAM

## 2019-01-17 PROCEDURE — 160042 HCHG SURGERY MINUTES - EA ADDL 1 MIN LEVEL 5: Performed by: ORTHOPAEDIC SURGERY

## 2019-01-17 PROCEDURE — 770006 HCHG ROOM/CARE - MED/SURG/GYN SEMI*

## 2019-01-17 PROCEDURE — 700102 HCHG RX REV CODE 250 W/ 637 OVERRIDE(OP): Performed by: ORTHOPAEDIC SURGERY

## 2019-01-17 PROCEDURE — 700101 HCHG RX REV CODE 250: Performed by: STUDENT IN AN ORGANIZED HEALTH CARE EDUCATION/TRAINING PROGRAM

## 2019-01-17 PROCEDURE — A9270 NON-COVERED ITEM OR SERVICE: HCPCS | Performed by: ORTHOPAEDIC SURGERY

## 2019-01-17 PROCEDURE — 160002 HCHG RECOVERY MINUTES (STAT): Performed by: ORTHOPAEDIC SURGERY

## 2019-01-17 PROCEDURE — 160035 HCHG PACU - 1ST 60 MINS PHASE I: Performed by: ORTHOPAEDIC SURGERY

## 2019-01-17 PROCEDURE — 0SR906A REPLACEMENT OF RIGHT HIP JOINT WITH OXIDIZED ZIRCONIUM ON POLYETHYLENE SYNTHETIC SUBSTITUTE, UNCEMENTED, OPEN APPROACH: ICD-10-PCS | Performed by: ORTHOPAEDIC SURGERY

## 2019-01-17 PROCEDURE — 700111 HCHG RX REV CODE 636 W/ 250 OVERRIDE (IP): Performed by: STUDENT IN AN ORGANIZED HEALTH CARE EDUCATION/TRAINING PROGRAM

## 2019-01-17 PROCEDURE — 700111 HCHG RX REV CODE 636 W/ 250 OVERRIDE (IP)

## 2019-01-17 PROCEDURE — 700112 HCHG RX REV CODE 229: Performed by: STUDENT IN AN ORGANIZED HEALTH CARE EDUCATION/TRAINING PROGRAM

## 2019-01-17 DEVICE — IMPLANT REF SPHER HEAD SCREW 20MM (1EA): Type: IMPLANTABLE DEVICE | Site: HIP | Status: FUNCTIONAL

## 2019-01-17 DEVICE — IMPLANT REF SPHER HEAD SCREW 30MM (1EA): Type: IMPLANTABLE DEVICE | Site: HIP | Status: FUNCTIONAL

## 2019-01-17 DEVICE — IMPLANT OXINIUM FEM HD 12/14 36 MM +0 (1EA): Type: IMPLANTABLE DEVICE | Site: HIP | Status: FUNCTIONAL

## 2019-01-17 DEVICE — IMPLANT R3 0 DEG XLPE ACET LNR 36MM X 52MM (1EA): Type: IMPLANTABLE DEVICE | Site: HIP | Status: FUNCTIONAL

## 2019-01-17 DEVICE — IMPLANTABLE DEVICE: Type: IMPLANTABLE DEVICE | Site: HIP | Status: FUNCTIONAL

## 2019-01-17 DEVICE — IMPLANT REF THREADED HOLE COVER (1EA): Type: IMPLANTABLE DEVICE | Site: HIP | Status: FUNCTIONAL

## 2019-01-17 DEVICE — IMPLANT R3 3 HOLE ACET SHELL 52MM (1EA): Type: IMPLANTABLE DEVICE | Site: HIP | Status: FUNCTIONAL

## 2019-01-17 DEVICE — STEM POLAR CEMENTLESS STANDARD TI/HA 2: Type: IMPLANTABLE DEVICE | Site: HIP | Status: FUNCTIONAL

## 2019-01-17 RX ORDER — KETOROLAC TROMETHAMINE 30 MG/ML
15 INJECTION, SOLUTION INTRAMUSCULAR; INTRAVENOUS EVERY 6 HOURS
Status: COMPLETED | OUTPATIENT
Start: 2019-01-17 | End: 2019-01-18

## 2019-01-17 RX ORDER — ZOLPIDEM TARTRATE 5 MG/1
5 TABLET ORAL NIGHTLY PRN
Status: DISCONTINUED | OUTPATIENT
Start: 2019-01-18 | End: 2019-01-18 | Stop reason: HOSPADM

## 2019-01-17 RX ORDER — FUROSEMIDE 40 MG/1
80 TABLET ORAL DAILY
Status: DISCONTINUED | OUTPATIENT
Start: 2019-01-17 | End: 2019-01-18 | Stop reason: HOSPADM

## 2019-01-17 RX ORDER — LISINOPRIL 2.5 MG/1
5 TABLET ORAL DAILY
Status: DISCONTINUED | OUTPATIENT
Start: 2019-01-17 | End: 2019-01-18 | Stop reason: HOSPADM

## 2019-01-17 RX ORDER — AMOXICILLIN 250 MG
1 CAPSULE ORAL
Status: DISCONTINUED | OUTPATIENT
Start: 2019-01-17 | End: 2019-01-18 | Stop reason: HOSPADM

## 2019-01-17 RX ORDER — ACETAMINOPHEN 650 MG
TABLET, EXTENDED RELEASE ORAL
Status: DISCONTINUED | OUTPATIENT
Start: 2019-01-17 | End: 2019-01-17 | Stop reason: HOSPADM

## 2019-01-17 RX ORDER — BUPIVACAINE HYDROCHLORIDE AND EPINEPHRINE 2.5; 5 MG/ML; UG/ML
INJECTION, SOLUTION INFILTRATION; PERINEURAL
Status: DISCONTINUED | OUTPATIENT
Start: 2019-01-17 | End: 2019-01-17 | Stop reason: HOSPADM

## 2019-01-17 RX ORDER — HYDROMORPHONE HYDROCHLORIDE 1 MG/ML
0.4 INJECTION, SOLUTION INTRAMUSCULAR; INTRAVENOUS; SUBCUTANEOUS
Status: DISCONTINUED | OUTPATIENT
Start: 2019-01-17 | End: 2019-01-17 | Stop reason: HOSPADM

## 2019-01-17 RX ORDER — SPIRONOLACTONE 25 MG
1 TABLET ORAL DAILY
Status: DISCONTINUED | OUTPATIENT
Start: 2019-01-17 | End: 2019-01-17

## 2019-01-17 RX ORDER — ENEMA 19; 7 G/133ML; G/133ML
1 ENEMA RECTAL
Status: DISCONTINUED | OUTPATIENT
Start: 2019-01-17 | End: 2019-01-18 | Stop reason: HOSPADM

## 2019-01-17 RX ORDER — DOCUSATE SODIUM 100 MG/1
100 CAPSULE, LIQUID FILLED ORAL 2 TIMES DAILY
Status: DISCONTINUED | OUTPATIENT
Start: 2019-01-17 | End: 2019-01-18 | Stop reason: HOSPADM

## 2019-01-17 RX ORDER — OXYCODONE HCL 5 MG/5 ML
5 SOLUTION, ORAL ORAL
Status: DISCONTINUED | OUTPATIENT
Start: 2019-01-17 | End: 2019-01-17 | Stop reason: HOSPADM

## 2019-01-17 RX ORDER — DIPHENHYDRAMINE HYDROCHLORIDE 50 MG/ML
25 INJECTION INTRAMUSCULAR; INTRAVENOUS EVERY 6 HOURS PRN
Status: DISCONTINUED | OUTPATIENT
Start: 2019-01-17 | End: 2019-01-18 | Stop reason: HOSPADM

## 2019-01-17 RX ORDER — CEFAZOLIN SODIUM 2 G/100ML
2 INJECTION, SOLUTION INTRAVENOUS EVERY 8 HOURS
Status: COMPLETED | OUTPATIENT
Start: 2019-01-17 | End: 2019-01-18

## 2019-01-17 RX ORDER — ONDANSETRON 2 MG/ML
4 INJECTION INTRAMUSCULAR; INTRAVENOUS EVERY 4 HOURS PRN
Status: DISCONTINUED | OUTPATIENT
Start: 2019-01-17 | End: 2019-01-18 | Stop reason: HOSPADM

## 2019-01-17 RX ORDER — ATENOLOL 50 MG/1
50 TABLET ORAL 2 TIMES DAILY
Status: DISCONTINUED | OUTPATIENT
Start: 2019-01-17 | End: 2019-01-18 | Stop reason: HOSPADM

## 2019-01-17 RX ORDER — OXYCODONE HYDROCHLORIDE 10 MG/1
10 TABLET ORAL EVERY 4 HOURS PRN
Status: DISCONTINUED | OUTPATIENT
Start: 2019-01-17 | End: 2019-01-18 | Stop reason: HOSPADM

## 2019-01-17 RX ORDER — HYDROMORPHONE HYDROCHLORIDE 1 MG/ML
0.5 INJECTION, SOLUTION INTRAMUSCULAR; INTRAVENOUS; SUBCUTANEOUS
Status: DISCONTINUED | OUTPATIENT
Start: 2019-01-17 | End: 2019-01-18 | Stop reason: HOSPADM

## 2019-01-17 RX ORDER — CHLORPROMAZINE HYDROCHLORIDE 10 MG/1
25 TABLET, FILM COATED ORAL EVERY 6 HOURS PRN
Status: DISCONTINUED | OUTPATIENT
Start: 2019-01-17 | End: 2019-01-18 | Stop reason: HOSPADM

## 2019-01-17 RX ORDER — LEVOTHYROXINE SODIUM 0.03 MG/1
50 TABLET ORAL EVERY MORNING
Status: DISCONTINUED | OUTPATIENT
Start: 2019-01-18 | End: 2019-01-18 | Stop reason: HOSPADM

## 2019-01-17 RX ORDER — DEXTROSE, SODIUM CHLORIDE, SODIUM LACTATE, POTASSIUM CHLORIDE, AND CALCIUM CHLORIDE 5; .6; .31; .03; .02 G/100ML; G/100ML; G/100ML; G/100ML; G/100ML
INJECTION, SOLUTION INTRAVENOUS CONTINUOUS
Status: DISPENSED | OUTPATIENT
Start: 2019-01-17 | End: 2019-01-17

## 2019-01-17 RX ORDER — ONDANSETRON 2 MG/ML
4 INJECTION INTRAMUSCULAR; INTRAVENOUS
Status: DISCONTINUED | OUTPATIENT
Start: 2019-01-17 | End: 2019-01-17 | Stop reason: HOSPADM

## 2019-01-17 RX ORDER — CHLORPROMAZINE HYDROCHLORIDE 25 MG/ML
25 INJECTION INTRAMUSCULAR EVERY 6 HOURS PRN
Status: DISCONTINUED | OUTPATIENT
Start: 2019-01-17 | End: 2019-01-18 | Stop reason: HOSPADM

## 2019-01-17 RX ORDER — SCOLOPAMINE TRANSDERMAL SYSTEM 1 MG/1
1 PATCH, EXTENDED RELEASE TRANSDERMAL
Status: DISCONTINUED | OUTPATIENT
Start: 2019-01-17 | End: 2019-01-18 | Stop reason: HOSPADM

## 2019-01-17 RX ORDER — ACETAMINOPHEN 500 MG
1000 TABLET ORAL EVERY 6 HOURS
Status: DISCONTINUED | OUTPATIENT
Start: 2019-01-17 | End: 2019-01-18 | Stop reason: HOSPADM

## 2019-01-17 RX ORDER — DIPHENHYDRAMINE HCL 25 MG
25 TABLET ORAL EVERY 6 HOURS PRN
Status: DISCONTINUED | OUTPATIENT
Start: 2019-01-17 | End: 2019-01-18 | Stop reason: HOSPADM

## 2019-01-17 RX ORDER — HYDROMORPHONE HYDROCHLORIDE 1 MG/ML
0.1 INJECTION, SOLUTION INTRAMUSCULAR; INTRAVENOUS; SUBCUTANEOUS
Status: DISCONTINUED | OUTPATIENT
Start: 2019-01-17 | End: 2019-01-17 | Stop reason: HOSPADM

## 2019-01-17 RX ORDER — ESTRADIOL 1 MG/1
2 TABLET ORAL
Status: DISCONTINUED | OUTPATIENT
Start: 2019-01-17 | End: 2019-01-18 | Stop reason: HOSPADM

## 2019-01-17 RX ORDER — SPIRONOLACTONE 25 MG/1
50 TABLET ORAL
Status: DISCONTINUED | OUTPATIENT
Start: 2019-01-17 | End: 2019-01-18 | Stop reason: HOSPADM

## 2019-01-17 RX ORDER — MAGNESIUM HYDROXIDE 1200 MG/15ML
LIQUID ORAL
Status: COMPLETED | OUTPATIENT
Start: 2019-01-17 | End: 2019-01-17

## 2019-01-17 RX ORDER — KETOROLAC TROMETHAMINE 30 MG/ML
INJECTION, SOLUTION INTRAMUSCULAR; INTRAVENOUS
Status: DISCONTINUED | OUTPATIENT
Start: 2019-01-17 | End: 2019-01-17 | Stop reason: HOSPADM

## 2019-01-17 RX ORDER — AMOXICILLIN 250 MG
1 CAPSULE ORAL NIGHTLY
Status: DISCONTINUED | OUTPATIENT
Start: 2019-01-17 | End: 2019-01-18 | Stop reason: HOSPADM

## 2019-01-17 RX ORDER — TRAMADOL HYDROCHLORIDE 50 MG/1
50 TABLET ORAL EVERY 4 HOURS PRN
Status: DISCONTINUED | OUTPATIENT
Start: 2019-01-17 | End: 2019-01-18 | Stop reason: HOSPADM

## 2019-01-17 RX ORDER — DEXAMETHASONE SODIUM PHOSPHATE 4 MG/ML
10 INJECTION, SOLUTION INTRA-ARTICULAR; INTRALESIONAL; INTRAMUSCULAR; INTRAVENOUS; SOFT TISSUE ONCE
Status: COMPLETED | OUTPATIENT
Start: 2019-01-18 | End: 2019-01-18

## 2019-01-17 RX ORDER — IPRATROPIUM BROMIDE 21 UG/1
2 SPRAY, METERED NASAL EVERY 12 HOURS
Status: DISCONTINUED | OUTPATIENT
Start: 2019-01-17 | End: 2019-01-18 | Stop reason: HOSPADM

## 2019-01-17 RX ORDER — HALOPERIDOL 5 MG/ML
1 INJECTION INTRAMUSCULAR EVERY 6 HOURS PRN
Status: DISCONTINUED | OUTPATIENT
Start: 2019-01-17 | End: 2019-01-18 | Stop reason: HOSPADM

## 2019-01-17 RX ORDER — HYDROMORPHONE HYDROCHLORIDE 1 MG/ML
0.2 INJECTION, SOLUTION INTRAMUSCULAR; INTRAVENOUS; SUBCUTANEOUS
Status: DISCONTINUED | OUTPATIENT
Start: 2019-01-17 | End: 2019-01-17 | Stop reason: HOSPADM

## 2019-01-17 RX ORDER — FUROSEMIDE 40 MG/1
80 TABLET ORAL DAILY
COMMUNITY
End: 2019-03-12 | Stop reason: SDUPTHER

## 2019-01-17 RX ORDER — POLYETHYLENE GLYCOL 3350 17 G/17G
1 POWDER, FOR SOLUTION ORAL 2 TIMES DAILY PRN
Status: DISCONTINUED | OUTPATIENT
Start: 2019-01-17 | End: 2019-01-18 | Stop reason: HOSPADM

## 2019-01-17 RX ORDER — CELECOXIB 200 MG/1
200 CAPSULE ORAL
Status: DISCONTINUED | OUTPATIENT
Start: 2019-01-19 | End: 2019-01-18 | Stop reason: HOSPADM

## 2019-01-17 RX ORDER — SODIUM CHLORIDE, SODIUM LACTATE, POTASSIUM CHLORIDE, CALCIUM CHLORIDE 600; 310; 30; 20 MG/100ML; MG/100ML; MG/100ML; MG/100ML
INJECTION, SOLUTION INTRAVENOUS CONTINUOUS
Status: DISCONTINUED | OUTPATIENT
Start: 2019-01-17 | End: 2019-01-17 | Stop reason: HOSPADM

## 2019-01-17 RX ORDER — TAMSULOSIN HYDROCHLORIDE 0.4 MG/1
0.4 CAPSULE ORAL
Status: DISCONTINUED | OUTPATIENT
Start: 2019-01-17 | End: 2019-01-18 | Stop reason: HOSPADM

## 2019-01-17 RX ORDER — MICONAZOLE NITRATE 20 MG/G
CREAM TOPICAL 2 TIMES DAILY
Status: DISCONTINUED | OUTPATIENT
Start: 2019-01-17 | End: 2019-01-18 | Stop reason: HOSPADM

## 2019-01-17 RX ORDER — ONDANSETRON 4 MG/1
8 TABLET, ORALLY DISINTEGRATING ORAL EVERY 8 HOURS PRN
Status: DISCONTINUED | OUTPATIENT
Start: 2019-01-17 | End: 2019-01-18 | Stop reason: HOSPADM

## 2019-01-17 RX ORDER — DEXAMETHASONE SODIUM PHOSPHATE 4 MG/ML
4 INJECTION, SOLUTION INTRA-ARTICULAR; INTRALESIONAL; INTRAMUSCULAR; INTRAVENOUS; SOFT TISSUE
Status: DISCONTINUED | OUTPATIENT
Start: 2019-01-17 | End: 2019-01-18 | Stop reason: HOSPADM

## 2019-01-17 RX ORDER — OXYCODONE HCL 5 MG/5 ML
10 SOLUTION, ORAL ORAL
Status: DISCONTINUED | OUTPATIENT
Start: 2019-01-17 | End: 2019-01-17 | Stop reason: HOSPADM

## 2019-01-17 RX ORDER — BISACODYL 10 MG
10 SUPPOSITORY, RECTAL RECTAL
Status: DISCONTINUED | OUTPATIENT
Start: 2019-01-17 | End: 2019-01-18 | Stop reason: HOSPADM

## 2019-01-17 RX ORDER — SODIUM CHLORIDE, SODIUM LACTATE, POTASSIUM CHLORIDE, CALCIUM CHLORIDE 600; 310; 30; 20 MG/100ML; MG/100ML; MG/100ML; MG/100ML
INJECTION, SOLUTION INTRAVENOUS ONCE
Status: COMPLETED | OUTPATIENT
Start: 2019-01-17 | End: 2019-01-17

## 2019-01-17 RX ORDER — WARFARIN SODIUM 5 MG/1
5 TABLET ORAL
Status: DISCONTINUED | OUTPATIENT
Start: 2019-01-17 | End: 2019-01-17

## 2019-01-17 RX ORDER — OXYCODONE HYDROCHLORIDE 5 MG/1
5 TABLET ORAL EVERY 4 HOURS PRN
Status: DISCONTINUED | OUTPATIENT
Start: 2019-01-17 | End: 2019-01-18 | Stop reason: HOSPADM

## 2019-01-17 RX ORDER — WARFARIN SODIUM 5 MG/1
5 TABLET ORAL
Status: DISCONTINUED | OUTPATIENT
Start: 2019-01-17 | End: 2019-01-18 | Stop reason: HOSPADM

## 2019-01-17 RX ORDER — FAMOTIDINE 20 MG/1
20 TABLET, FILM COATED ORAL 2 TIMES DAILY
Status: DISCONTINUED | OUTPATIENT
Start: 2019-01-17 | End: 2019-01-18 | Stop reason: HOSPADM

## 2019-01-17 RX ADMIN — ATENOLOL 50 MG: 50 TABLET ORAL at 18:00

## 2019-01-17 RX ADMIN — MICONAZOLE NITRATE: 20 CREAM TOPICAL at 18:07

## 2019-01-17 RX ADMIN — FAMOTIDINE 20 MG: 20 TABLET ORAL at 18:00

## 2019-01-17 RX ADMIN — GABAPENTIN 600 MG: 300 CAPSULE ORAL at 06:14

## 2019-01-17 RX ADMIN — CELECOXIB 200 MG: 200 CAPSULE ORAL at 06:14

## 2019-01-17 RX ADMIN — WARFARIN SODIUM 5 MG: 5 TABLET ORAL at 18:07

## 2019-01-17 RX ADMIN — SODIUM CHLORIDE, SODIUM LACTATE, POTASSIUM CHLORIDE, CALCIUM CHLORIDE: 600; 310; 30; 20 INJECTION, SOLUTION INTRAVENOUS at 06:08

## 2019-01-17 RX ADMIN — ACETAMINOPHEN 1000 MG: 500 TABLET ORAL at 17:59

## 2019-01-17 RX ADMIN — FUROSEMIDE 80 MG: 80 TABLET ORAL at 15:16

## 2019-01-17 RX ADMIN — ACETAMINOPHEN 500 MG: 500 TABLET, FILM COATED ORAL at 06:14

## 2019-01-17 RX ADMIN — SPIRONOLACTONE 50 MG: 50 TABLET ORAL at 15:16

## 2019-01-17 RX ADMIN — CEFAZOLIN SODIUM 2 G: 2 INJECTION, SOLUTION INTRAVENOUS at 23:57

## 2019-01-17 RX ADMIN — TAMSULOSIN HYDROCHLORIDE 0.4 MG: 0.4 CAPSULE ORAL at 15:16

## 2019-01-17 RX ADMIN — FENTANYL CITRATE 50 MCG: 50 INJECTION, SOLUTION INTRAMUSCULAR; INTRAVENOUS at 09:28

## 2019-01-17 RX ADMIN — IPRATROPIUM BROMIDE 2 SPRAY: 21 SPRAY NASAL at 18:05

## 2019-01-17 RX ADMIN — ACETAMINOPHEN 1000 MG: 500 TABLET ORAL at 23:58

## 2019-01-17 RX ADMIN — SODIUM CHLORIDE, SODIUM LACTATE, POTASSIUM CHLORIDE, CALCIUM CHLORIDE AND DEXTROSE MONOHYDRATE: 5; 600; 310; 30; 20 INJECTION, SOLUTION INTRAVENOUS at 15:17

## 2019-01-17 RX ADMIN — KETOROLAC TROMETHAMINE 15 MG: 30 INJECTION, SOLUTION INTRAMUSCULAR; INTRAVENOUS at 18:05

## 2019-01-17 RX ADMIN — SERTRALINE 50 MG: 50 TABLET, FILM COATED ORAL at 18:00

## 2019-01-17 RX ADMIN — ENOXAPARIN SODIUM 40 MG: 100 INJECTION SUBCUTANEOUS at 20:54

## 2019-01-17 RX ADMIN — CEFAZOLIN SODIUM 2 G: 2 INJECTION, SOLUTION INTRAVENOUS at 15:30

## 2019-01-17 RX ADMIN — KETOROLAC TROMETHAMINE 15 MG: 30 INJECTION, SOLUTION INTRAMUSCULAR; INTRAVENOUS at 23:58

## 2019-01-17 RX ADMIN — LISINOPRIL 5 MG: 5 TABLET ORAL at 18:00

## 2019-01-17 RX ADMIN — ESTRADIOL 2 MG: 2 TABLET ORAL at 18:04

## 2019-01-17 ASSESSMENT — COGNITIVE AND FUNCTIONAL STATUS - GENERAL
MOVING TO AND FROM BED TO CHAIR: A LITTLE
DRESSING REGULAR UPPER BODY CLOTHING: A LITTLE
CLIMB 3 TO 5 STEPS WITH RAILING: A LITTLE
MOBILITY SCORE: 15
CLIMB 3 TO 5 STEPS WITH RAILING: A LITTLE
DAILY ACTIVITIY SCORE: 18
TOILETING: A LITTLE
DRESSING REGULAR LOWER BODY CLOTHING: A LITTLE
EATING MEALS: A LITTLE
MOVING FROM LYING ON BACK TO SITTING ON SIDE OF FLAT BED: A LITTLE
MOVING FROM LYING ON BACK TO SITTING ON SIDE OF FLAT BED: A LITTLE
STANDING UP FROM CHAIR USING ARMS: A LITTLE
WALKING IN HOSPITAL ROOM: A LITTLE
STANDING UP FROM CHAIR USING ARMS: A LITTLE
WALKING IN HOSPITAL ROOM: A LITTLE
TURNING FROM BACK TO SIDE WHILE IN FLAT BAD: A LITTLE
SUGGESTED CMS G CODE MODIFIER MOBILITY: CK
SUGGESTED CMS G CODE MODIFIER MOBILITY: CK
TURNING FROM BACK TO SIDE WHILE IN FLAT BAD: A LOT
MOVING TO AND FROM BED TO CHAIR: UNABLE
SUGGESTED CMS G CODE MODIFIER DAILY ACTIVITY: CK
PERSONAL GROOMING: A LITTLE
HELP NEEDED FOR BATHING: A LITTLE
MOBILITY SCORE: 18

## 2019-01-17 ASSESSMENT — CHA2DS2 SCORE
VASCULAR DISEASE: YES
CHA2DS2 VASC SCORE: 4
AGE 65 TO 74: NO
SEX: FEMALE
DIABETES: NO
CHF OR LEFT VENTRICULAR DYSFUNCTION: NO
AGE 75 OR GREATER: YES
PRIOR STROKE OR TIA OR THROMBOEMBOLISM: NO
HYPERTENSION: NO

## 2019-01-17 ASSESSMENT — PAIN SCALES - GENERAL
PAINLEVEL_OUTOF10: 0
PAINLEVEL_OUTOF10: ASSUMED PAIN PRESENT
PAINLEVEL_OUTOF10: 0
PAINLEVEL_OUTOF10: 1
PAINLEVEL_OUTOF10: 0
PAINLEVEL_OUTOF10: ASSUMED PAIN PRESENT
PAINLEVEL_OUTOF10: 0

## 2019-01-17 ASSESSMENT — COPD QUESTIONNAIRES
DURING THE PAST 4 WEEKS HOW MUCH DID YOU FEEL SHORT OF BREATH: NONE/LITTLE OF THE TIME
HAVE YOU SMOKED AT LEAST 100 CIGARETTES IN YOUR ENTIRE LIFE: NO/DON'T KNOW
DO YOU EVER COUGH UP ANY MUCUS OR PHLEGM?: NO/ONLY WITH OCCASIONAL COLDS OR INFECTIONS
COPD SCREENING SCORE: 3

## 2019-01-17 ASSESSMENT — GAIT ASSESSMENTS
GAIT LEVEL OF ASSIST: STAND BY ASSIST
DEVIATION: OTHER (COMMENT)
ASSISTIVE DEVICE: FRONT WHEEL WALKER
DISTANCE (FEET): 22

## 2019-01-17 ASSESSMENT — LIFESTYLE VARIABLES
EVER_SMOKED: NEVER
EVER_SMOKED: NEVER
ALCOHOL_USE: NO

## 2019-01-17 NOTE — OR NURSING
Patient is doing well in recovery. Site to right hip CDI with andre drain in place. Polar Ice pack placed per order. We placed a pillow case under polar ice pack to protect the skin. She is sleeping and has only required one dose of fentanyl for calf pain. Calf assessed and no redness or swelling noted. Pain has resolved and she no longer complains of any other pain there.  has been updated and her personal belongings are on her bed.     1200- Patient remains stable and pain free. Tolerating po well and resting for most of recovery period. Report given and her  has been updated.

## 2019-01-17 NOTE — PROGRESS NOTES
· 2 RN skin check complete with DANNY Yang.  · Devices in place O2 tubing, prevena drain.  · Skin assessed under devices no issues with Prvena tubing, O2 tubing has some redness and blanching.  · Breakdown noted left lower skin fold, one scab right posterior hip, bruising and lumps from said lovenox injections pre-op, boggy red/blanching heels noted.   · Confirmed pressure ulcers found on buttocks, patient being seen at wound clinic.  · Wound consult placed and wound reported by preop RN per notes.  · The following interventions in place float boots, changed hard tubing to silicone tubing, and plans to apply waffle overlay.

## 2019-01-17 NOTE — PROGRESS NOTES
Mirbegron is not a medication that pharmacy carries. Patient is to have family member bring in her home med asap.

## 2019-01-17 NOTE — OR NURSING
Pre-op complete.     Pressure ulcer noted on buttocks in pre-op. Patient states she has been seeing a nurse outpatient who said it was improving. Photo taken and uploaded into chart. Dr. Mercedes notified of wound. Wound consult placed.    Patient and family updated on plan of care. All questions answered at this time.  Call light within reach, advised to call for any questions or concerns. Hourly rounding in place.

## 2019-01-17 NOTE — OP REPORT
DIAGNOSIS: Osteoarthritis, right hip.    PROCEDURE: Right Total hip arthroplasty.    ANESTHESIA: General.    COMPLICATIONS: None.    SURGEON: Juan C Mercedes MD.    ASSISTANT: Chinedu Rodriguez    INDICATIONS: This is a patient with severe osteoarthritis causing pain,   having failed conservative treatments.    DESCRIPTION OF PROCEDURE: Patient was identified in the preop area, site was   marked, taken back to the operating room and underwent general anesthesia.   right lower extremity was prepped and draped in sterile manner. Preoperative   timeout was held and antibiotics were given. Incision was made coming off the  ASIS. Soft tissue dissected down to fascia. Fascia was split in line with   the tensor. Tensor was retracted laterally. Deep fascia was incised and   vessels were ligated. A capsulotomy was performed and then an osteotomy of   the femoral neck. The acetabulum was then reamed up to a 52 and a 52 R3 cluster   hole cup by Smith and Nephew was placed. A liner was placed for a 36 head.   Osteophytes were debrided and then the femur was externally rotated and   extended. This was then broached up to a size 2, and a 2 standard offset polar stem  by Smith and Nephew was placed. Final trialing showed equal leg lengths with  a 0 head, the 0 36 Oxinium head by Smith and Nephew was placed. Wound was   soaked with dilute Betadine solution and was injected with Marcaine. Vicryl   was used for the fascia, Monocryl soft tissue skin and Dermabond for the final  skin layer. Patient was woken up, taken back to PACU, will be weightbear as   tolerated.

## 2019-01-18 VITALS
SYSTOLIC BLOOD PRESSURE: 91 MMHG | TEMPERATURE: 97.5 F | OXYGEN SATURATION: 96 % | DIASTOLIC BLOOD PRESSURE: 39 MMHG | RESPIRATION RATE: 16 BRPM | BODY MASS INDEX: 32.52 KG/M2 | HEART RATE: 82 BPM | WEIGHT: 190.48 LBS | HEIGHT: 64 IN

## 2019-01-18 LAB
HCT VFR BLD AUTO: 35.3 % (ref 37–47)
HGB BLD-MCNC: 11.5 G/DL (ref 12–16)
INR PPP: 1.24 (ref 0.87–1.13)
PROTHROMBIN TIME: 15.7 SEC (ref 12–14.6)

## 2019-01-18 PROCEDURE — 85014 HEMATOCRIT: CPT

## 2019-01-18 PROCEDURE — 700111 HCHG RX REV CODE 636 W/ 250 OVERRIDE (IP): Performed by: STUDENT IN AN ORGANIZED HEALTH CARE EDUCATION/TRAINING PROGRAM

## 2019-01-18 PROCEDURE — 97530 THERAPEUTIC ACTIVITIES: CPT

## 2019-01-18 PROCEDURE — 700102 HCHG RX REV CODE 250 W/ 637 OVERRIDE(OP): Performed by: STUDENT IN AN ORGANIZED HEALTH CARE EDUCATION/TRAINING PROGRAM

## 2019-01-18 PROCEDURE — 97116 GAIT TRAINING THERAPY: CPT

## 2019-01-18 PROCEDURE — 700105 HCHG RX REV CODE 258: Performed by: PHYSICIAN ASSISTANT

## 2019-01-18 PROCEDURE — 36415 COLL VENOUS BLD VENIPUNCTURE: CPT

## 2019-01-18 PROCEDURE — A9270 NON-COVERED ITEM OR SERVICE: HCPCS | Performed by: STUDENT IN AN ORGANIZED HEALTH CARE EDUCATION/TRAINING PROGRAM

## 2019-01-18 PROCEDURE — 85018 HEMOGLOBIN: CPT

## 2019-01-18 PROCEDURE — 85610 PROTHROMBIN TIME: CPT

## 2019-01-18 PROCEDURE — 97165 OT EVAL LOW COMPLEX 30 MIN: CPT

## 2019-01-18 RX ORDER — SODIUM CHLORIDE 9 MG/ML
1000 INJECTION, SOLUTION INTRAVENOUS ONCE
Status: COMPLETED | OUTPATIENT
Start: 2019-01-18 | End: 2019-01-18

## 2019-01-18 RX ORDER — OXYCODONE HYDROCHLORIDE 5 MG/1
5 TABLET ORAL EVERY 4 HOURS PRN
Qty: 30 TAB | Refills: 0 | Status: SHIPPED | OUTPATIENT
Start: 2019-01-18 | End: 2019-02-01

## 2019-01-18 RX ORDER — SODIUM CHLORIDE 9 MG/ML
500 INJECTION, SOLUTION INTRAVENOUS ONCE
Status: COMPLETED | OUTPATIENT
Start: 2019-01-18 | End: 2019-01-18

## 2019-01-18 RX ADMIN — ACETAMINOPHEN 1000 MG: 500 TABLET ORAL at 11:30

## 2019-01-18 RX ADMIN — KETOROLAC TROMETHAMINE 15 MG: 30 INJECTION, SOLUTION INTRAMUSCULAR; INTRAVENOUS at 11:32

## 2019-01-18 RX ADMIN — KETOROLAC TROMETHAMINE 15 MG: 30 INJECTION, SOLUTION INTRAMUSCULAR; INTRAVENOUS at 06:01

## 2019-01-18 RX ADMIN — ACETAMINOPHEN 1000 MG: 500 TABLET ORAL at 06:00

## 2019-01-18 RX ADMIN — SODIUM CHLORIDE 1000 ML: 9 INJECTION, SOLUTION INTRAVENOUS at 11:58

## 2019-01-18 RX ADMIN — SODIUM CHLORIDE 500 ML: 9 INJECTION, SOLUTION INTRAVENOUS at 05:02

## 2019-01-18 RX ADMIN — LEVOTHYROXINE SODIUM 50 MCG: 50 TABLET ORAL at 06:00

## 2019-01-18 RX ADMIN — FAMOTIDINE 20 MG: 20 TABLET ORAL at 06:00

## 2019-01-18 RX ADMIN — DEXAMETHASONE SODIUM PHOSPHATE 10 MG: 4 INJECTION, SOLUTION INTRA-ARTICULAR; INTRALESIONAL; INTRAMUSCULAR; INTRAVENOUS; SOFT TISSUE at 06:01

## 2019-01-18 RX ADMIN — TAMSULOSIN HYDROCHLORIDE 0.4 MG: 0.4 CAPSULE ORAL at 08:13

## 2019-01-18 ASSESSMENT — COGNITIVE AND FUNCTIONAL STATUS - GENERAL
SUGGESTED CMS G CODE MODIFIER DAILY ACTIVITY: CK
PERSONAL GROOMING: A LITTLE
WALKING IN HOSPITAL ROOM: A LITTLE
DAILY ACTIVITIY SCORE: 19
MOBILITY SCORE: 15
MOBILITY SCORE: 23
TOILETING: A LITTLE
SUGGESTED CMS G CODE MODIFIER DAILY ACTIVITY: CK
MOVING FROM LYING ON BACK TO SITTING ON SIDE OF FLAT BED: A LOT
DRESSING REGULAR LOWER BODY CLOTHING: A LOT
DAILY ACTIVITIY SCORE: 17
PERSONAL GROOMING: A LITTLE
CLIMB 3 TO 5 STEPS WITH RAILING: A LITTLE
EATING MEALS: A LITTLE
MOVING TO AND FROM BED TO CHAIR: A LOT
HELP NEEDED FOR BATHING: A LITTLE
DRESSING REGULAR UPPER BODY CLOTHING: A LITTLE
STANDING UP FROM CHAIR USING ARMS: A LITTLE
SUGGESTED CMS G CODE MODIFIER MOBILITY: CK
HELP NEEDED FOR BATHING: A LOT
TURNING FROM BACK TO SIDE WHILE IN FLAT BAD: A LITTLE
CLIMB 3 TO 5 STEPS WITH RAILING: A LOT
DRESSING REGULAR LOWER BODY CLOTHING: A LOT
SUGGESTED CMS G CODE MODIFIER MOBILITY: CI

## 2019-01-18 ASSESSMENT — GAIT ASSESSMENTS
DISTANCE (FEET): 100
DEVIATION: ANTALGIC
ASSISTIVE DEVICE: FRONT WHEEL WALKER
GAIT LEVEL OF ASSIST: SUPERVISED

## 2019-01-18 ASSESSMENT — ACTIVITIES OF DAILY LIVING (ADL): TOILETING: INDEPENDENT

## 2019-01-18 ASSESSMENT — LIFESTYLE VARIABLES: EVER_SMOKED: NEVER

## 2019-01-18 NOTE — CARE PLAN
Problem: Safety  Goal: Will remain free from falls  Non-skid socks on and belongings within reach. Call light within reach. Hourly rounding in place.        Problem: Infection  Goal: Will remain free from infection      Patient receiving Ancef as an ATB for infection prevention.

## 2019-01-18 NOTE — CARE PLAN
Problem: Safety  Goal: Will remain free from falls  Outcome: PROGRESSING AS EXPECTED  Pt instructed to call before getting OOB. Pt verbalized understanding and calls appropriately.    Problem: Pain Management  Goal: Pain level will decrease to patient's comfort goal  Outcome: PROGRESSING AS EXPECTED  Pt medicated per MAR, reports adequate pain relief with multi-modal pain management including ice pack, ambulation, medication and distraction.

## 2019-01-18 NOTE — PROGRESS NOTES
Inpatient Anticoagulation Service Note    Date: 1/17/2019  Reason for Anticoagulation: Atrial Fibrillation        Target INR: 2.0 to 3.0    INR from last 7 days     Date/Time INR Value    01/17/19 0606 (!)  1.16        Dose from last 7 days     Date/Time Dose (mg)    01/17/19 1600  5        Average Dose (mg):  (2.5mg all days except for 3.75mg on Monday)  Significant Interactions: Antibiotics, NSAID (Aldactone; Celebrex; Toradol)  Bridge Therapy: No     Reversal Agent Administered: Not Applicable    Comments: Resumption of home warfarin s/p total hip.  Warfarin held for 4 days prior to surgery.  Currently no s/sx of bleeding post-op, H/H labs are pending.  Lovenox VTE prophylaxis on MAR.  DDI include celebrex, cefazolin.  Post-op along with patient's age may place her at a higher bleed risk. Will bolus with 5mg tonight, but anticipate resumption of home regimen tomorrow.    Plan:  Warfarin 5mg.  Lovenox 40mg daily.  INR in AM.  Education Material Provided?: No (chronic warfarin patient)  Pharmacist suggested discharge dosing: Resumption of home regimen: 2.5mg all days except for 3.75mg on Monday     Jerome Oneill

## 2019-01-18 NOTE — PROGRESS NOTES
MD has signed off on patient to go home today. BP has come up to systolic over 100. Patient will be leaving today with  to go home. Discharge instructions given and patient has no questions or concerns at this time. Prescriptions given to patient at bedside. DME not needed.

## 2019-01-18 NOTE — PROGRESS NOTES
Shift report received from night RN, assumed care at 0715. Patient resting in bed, heels floated with pillows, did not want float boots put back on after getting back in bed from chair, routinely medicated prn per MAR. AOx4, up x1, FWW, calls appropriately, bed alarm in use. PIV assessed and running bolus NS from NOC shift. Dressing/drain CDI, O2 RA, SPO2 94%. VTE lovenox and SCDs. Plan is dc home today despite low BPs overnight, post therapy this am. Needs met at this time.    Discussed POC for multimodal pain management, monitoring VS/labs/I&O, mobility, day time routine, comfort, and safety. Patient has call light and personal belongings within reach. Safety and fall precautions in place. Reviewed current labs, notes, medications, and orders. Hourly rounding in place with RN and CNA.

## 2019-01-18 NOTE — THERAPY
"Physical Therapy Evaluation completed.   Bed Mobility:  Supine to Sit: Minimal Assist  Transfers: Sit to Stand: Stand by Assist (from EOB and toilet)  Gait: Level Of Assist: Stand by Assist (close 2' to wooziness) with Front-Wheel Walker       Plan of Care: anticipate only 1-2 visits prior to dc to home  Discharge Recommendations: Equipment: No Equipment Needed. Has FWW and other AD at home; see below    Pt presents to PT s/p CRISTOFER PARRY. She was able to demonstrate short distance ambulation with FWW with close SBA (more 2' to current wooziness post surgery). Given current objective findings and pt presentation anticipate pt will progress well and be functionally capable of dc to home once medically cleared. She may benefit from outpatient PT at later date though dependent on functional recovery and limitations after acute/subacute healing. WIll continue to visit with focus on increased ambulation and stair practice prior to dc to home.     See \"Rehab Therapy-Acute\" Patient Summary Report for complete documentation.     "

## 2019-01-18 NOTE — THERAPY
"Occupational Therapy Evaluation completed.   Functional Status:  Supervised to modified independent for safe transfers and has all needed equipment at home  Plan of Care: Patient with no further skilled OT needs in the acute care setting at this time  Discharge Recommendations:  Equipment: No Equipment Needed. Post-acute therapy Currently anticipate no further skilled therapy needs once patient is discharged from the inpatient setting.    See \"Rehab Therapy-Acute\" Patient Summary Report for complete documentation.    "

## 2019-01-18 NOTE — PROGRESS NOTES
Inpatient Anticoagulation Service Note    Date: 1/18/2019  Reason for Anticoagulation: Atrial Fibrillation   [id number] VASc Score: 4  Hemoglobin Value: (!) 11.5  Hematocrit Value: (!) 35.3  Target INR: 2.0 to 3.0    INR from last 7 days     Date/Time INR Value    01/18/19 0329 (!)  1.24    01/17/19 0606 (!)  1.16        Dose from last 7 days     Date/Time Dose (mg)    01/18/19 1300  5    01/17/19 1600  5        Average Dose (mg):  (Home dose: 3.75 mg Mon and 2.5 mg AOD)  Significant Interactions: Celebrex (through 1/23)  Bridge Therapy: No     Comments: Dosing resumed yesterday. INR up slightly overnight, but not likely from dose given, should see effect tomorrow AM. Will continue 5 mg PO daily for now. Will likely need to decrease dose in the next couple of days to avoid overshooting goal range. INR in AM. No bleeding noted, but there was a drop in H/H from prior CBC from 2 weeks ago.    Education Material Provided?: No (chronic warfarin patient)  Pharmacist suggested discharge dosing: Can likely resume home dose of Warfarin 3.75 mg on Monday and 2.5 mg all other days of week with follow up  INR within 72 hours of discharge    Stevenson Claudio, PeterD, BCPS

## 2019-01-18 NOTE — PROGRESS NOTES
Patient worked with PT, did stairs and on the way back she needed to stop and hung over her walker. PT stated she did well but was just tired post stairs eval. This RN checked her BP and machine read 70's over 30's. I decided to check manual and got the same, then charge RN was able to get 84/39. Educated to rest and drink lots of fluids, [facility] is calling  to check home readings for comparison. Will continue to reassess often.

## 2019-01-18 NOTE — THERAPY
"Pt progressing as expected. Pt exhibited imroved strength, balance, and activity tolerance. Pt demonstrated a more steady antalgic gait. Pt no longer woozy.  Pt required verbal cuies to remain in walker during all mobility. Pt able to ascend/descend 2 stairs w/ease. Pt would benefit from further acute PT txs to progress towards goals and independence. Anticipate pt to be able to d/c home w/family support and outpt services.    Physical Therapy Treatment completed.   Bed Mobility:  Supine to Sit: Supervised  Transfers: Sit to Stand: Supervised  Gait: Level Of Assist: Supervised with Front-Wheel Walker       Plan of Care: Will benefit from Physical Therapy 3 times per week    See \"Rehab Therapy-Acute\" Patient Summary Report for complete documentation.       "

## 2019-01-18 NOTE — DISCHARGE INSTRUCTIONS
Discharge Instructions    Discharged to home by car with relative. Discharged via wheelchair, hospital escort: Yes.  Special equipment needed: Not Applicable    Be sure to schedule a follow-up appointment with your primary care doctor or any specialists as instructed.     Discharge Plan:   Diet Plan: Discussed  Activity Level: Discussed  Confirmed Follow up Appointment: Patient to Call and Schedule Appointment  Confirmed Symptoms Management: Discussed  Medication Reconciliation Updated: Yes  Influenza Vaccine Indication: Not indicated: Previously immunized this influenza season and > 8 years of age    I understand that a diet low in cholesterol, fat, and sodium is recommended for good health. Unless I have been given specific instructions below for another diet, I accept this instruction as my diet prescription.   Other diet: Regular    Special Instructions: Discharge instructions for the Orthopedic Patient    Follow up with Primary Care Physician within 2 weeks of discharge to home, regarding:  Review of medications and diagnostic testing.  Surveillance for medical complications.  Workup and treatment of osteoporosis, if appropriate.     -Is this a Joint Replacement patient? Yes   Total Joint Hip Replacement Discharge Instructions    Pain  - The goal is to slowly wean off the prescription pain medicine.  - Ice can be used for pain control.  20 minutes at a time is recommended, and never directly against your skin or incision.  - Most patients are off the pain pills by 3 weeks; others may require a low level of pain medications for many months. If your pain continues to be severe, follow up with your physician.  Infection  Deep hip joint infections that require removal of the prostheses occur in less than 0.1% of patients. Lesser infections in the skin (cellulites) are more common and much more easily treated.  - Keep the incision as clean and dry as possible.  - Always wash your hands before touching your  incision.  - Skin infections tend to develop around 7-10 days after surgery, most can be treated with oral antibiotics.  - Dental Care should be delayed for 3 months after surgery, your surgeon recommends taking a dose of antibiotics 1 hour prior to any dental procedure.  After 2 years, most surgeons recommend antibiotics only before an extensive procedure.  Ask your surgeon what he recommends.  - Signs and symptoms of infection can include:  low grade fever, redness, pain, swelling and drainage from your incision.  Notify your surgeon immediately if you develop any of these symptoms.  Post op Disturbances  - Bowel habits - constipation is extremely common and is caused by a combination of anesthesia, lack of mobility and pain medicine.  Use stool softeners or laxatives if necessary. It is important not to ignore this problem, as bowel obstructions can be a serious complication after joint replacement surgery.  - Mood/Energy Level - Many patients experience a lack of energy and endurance for up to 2-3 months after surgery.  Some may also feel down and can even become depressed.  This is likely due to the postoperative anemia, change in activity level, lack of sleep, pain medicine and just the emotional reaction to the surgery itself that is a big disruption in a person’s life.  This usually passes.  If symptoms persist, follow up with your primary physician.  - Returning to work - Your surgeon will give you more specific instructions.  Generally, if you work a sedentary job requiring little standing or walking, most patients may return within 2-6 weeks.  Manual labor jobs involving walking, lifting and standing may take 3-4 months.  Your surgeon’s office can provide a release to part-time or light duty work early on in your recovery and progress you to full duty as able.  - Driving - You can begin driving an automatic shift car in 4 to 8 weeks, provided you are no longer taking narcotic pain medication. If you have  a stick-shift car and your right hip was replaced, do not begin driving until your doctor says you can.   - Avoiding falls -  throw rugs and tack down loose carpeting.  Be aware of floor hazards such as pets, small objects or uneven surfaces.   -  Airport Metal Detectors - The sensitivity of metal detectors varies and it is likely that your prosthesis will cause an alarm. Inform the  that you have an artificial joint.  Diet  - Resume your normal diet as tolerated.  - It is important to achieve a healthy nutritional status by eating a well balanced diet on a regular basis.  - Your physician may recommend that you take iron and vitamin supplements.   - Continue to drink plenty of fluids.  Shower/Bathing  - You may shower as soon as you get home from the hospital unless otherwise instructed.  - Keep your incision out of water.  To keep the incision dry when showering, cover it with a plastic bag or plastic wrap.  - Pat incision dry if it gets wet.  Don’t rub.  - Do not submerge in a bath until staples are out and the incision is completely healed. (Approximately 6-8 weeks after surgery).  Dressing Change:  Procedure (if recommended by your physician)  - Wash hands.  - Open all dressing change materials.  - Remove old dressing and discard.  - Inspect incision for redness, increase in clear drainage, yellow/green drainage, odor and surrounding skin hot to touch.  -  ABD (large gauze) pad by one corner and lay over the incision.  Be careful not to touch the inside of the dressing that will lay over the incision.  - Secure in place as instructed (Ace wrap or tape).    Swelling/Bruising  - Swelling is normal after hip replacement and can involve the thigh, knee, calf and foot.  - Swelling can last from 3-6 months.  - Elevate your leg higher than your heart while reclining.  The first week you are home you should elevate your leg an equal amount of time, as you are active.    - Anti-inflammatory  pills can be taken once you have stopped the blood thinners.  - The swelling is usually worse after you go home since you are upright for longer periods of time.  - Bruising is common and can involve the entire leg including the thigh, calf and even foot.  Bruising often does not appear until after you arrive home and it can be quite dramatic- purple, black, green.  The bruising you can see is not usually concerning and will subside without any treatment.      Blood Clot Prevention  Blood clots in the legs and the less common, but frightening, clots that travel to the lungs are a real focus of our preventative. Most patients are at standard risk for them, but those patients who are at higher risk include people who have had previous clots, a family history of clotting, smoking, diabetes, obesity, advanced age, use of estrogen and a sedentary lifestyle.    - Signs of blood clots in legs - Swelling in thigh, calf or ankle that does not go down with elevation.  Pain, heat and tenderness in calf, back of calf or groin area.  NOTE: blood clots can occur in either leg.  - You have been receiving anticoagulant therapy (blood thinners) in the hospital and you may be instructed to continue at home depending on your risk factors.  - Your risk for developing a clot continues for up to 2-3 months after surgery.  You should avoid prolonged sitting and dehydration during that time (long air trips and car trips).  If you do take a trip during this time, please get up and move around every 1- 1.5 hours.  - If you are prescribed blood thinning medication for home, follow instructions as directed. (Handouts provided if applicable).      Activity    Once you get home, you should stay active. The key is not to overdo it! While you can expect some good days and some bad days, you should notice a gradual improvement over time you should notice a gradual improvement and a gradual increase in your endurance over the next 6 to 12  months.    - Weight Bearing - If you have undergone cemented or hybrid hip replacement, you can put some weight on the leg immediately using a cane or walker, and you should continue to use some support for 4 to 6 weeks to help the muscles recover.   - Sleeping Positions - Sleep on your back with your legs slightly apart or on your side with a regular pillow between your knees. Be sure to use the pillow for at least 6 weeks, or until your doctor says you can do without it. Sleeping on your stomach should be all right  - Sitting - For at least the first 3 months, sit only in chairs that have arms. Do not sit on low chairs, low stools, or reclining chairs. Do not cross your legs at the knees. The physical therapist will show you how to sit and stand from a chair, keeping your affected leg out in front of you. Get up and move around on a regular basis--at least once every hour.  - Walking - Walk as much as you like once your doctor gives you the go-ahead, but remember that walking is no substitute for your prescribed exercises. Walking with a pair of trekking poles is helpful and adds as much as 40% to the exercise you get when you walk  - Therapy may be needed in some cases, to strengthen your muscles and improve your gait (walking pattern).  This decision will be made at your post-operative appointment.  Follow your therapist recommended post-operative exercises (handout provided by Therapist).  - Swimming is also recommended; you can begin as soon as the sutures have been removed and the wound is healed, approximately 6 to 8 weeks after surgery. Using a pair of training fins may make swimming a more enjoyable and effective exercise.  - Other activities - Lower impact activities are preferred.  If you have specific questions, consult your Surgeon.    - Sexual activity - Your surgeon can tell you when it should be safe to resume sexual activity.      When to Call the Doctor   Call the physician if:   - Fever over  100.5? F  - Increased pain, drainage, redness, odor or heat around the incision area  - Shaking chills  - Increased knee pain with activity and rest  - Increased pain in calf, tenderness or redness above or below the knee  - Increased swelling of calf, ankle, foot  - Sudden increased shortness of breath, sudden onset of chest pain, localized chest pain with coughing  - Incision opening  Or, if there are any questions or concerns about medications or care.       -Is this patient being discharged with medication to prevent blood clots?  No    · Is patient discharged on Warfarin / Coumadin?   No     Depression / Suicide Risk    As you are discharged from this [facility] facility, it is important to learn how to keep safe from harming yourself.    Recognize the warning signs:  · Abrupt changes in personality, positive or negative- including increase in energy   · Giving away possessions  · Change in eating patterns- significant weight changes-  positive or negative  · Change in sleeping patterns- unable to sleep or sleeping all the time   · Unwillingness or inability to communicate  · Depression  · Unusual sadness, discouragement and loneliness  · Talk of wanting to die  · Neglect of personal appearance   · Rebelliousness- reckless behavior  · Withdrawal from people/activities they love  · Confusion- inability to concentrate     If you or a loved one observes any of these behaviors or has concerns about self-harm, here's what you can do:  · Talk about it- your feelings and reasons for harming yourself  · Remove any means that you might use to hurt yourself (examples: pills, rope, extension cords, firearm)  · Get professional help from the community (Mental Health, Substance Abuse, psychological counseling)  · Do not be alone:Call your Safe Contact- someone whom you trust who will be there for you.  · Call your local CRISIS HOTLINE 182-0617 or 071-471-8674  · Call your local Children's Mobile Crisis Response Team  [facility] ([phone]) 988-1776 or www.[employer].Xuba  · Call the toll free National Suicide Prevention Hotlines   · National Suicide Prevention Lifeline 218-310-AZBM (9018)  · National Hope Line Network 800-SUICIDE (102-3735)

## 2019-01-18 NOTE — PROGRESS NOTES
"Patient seen and examined  No complaints    Blood pressure (!) 93/44, pulse 83, temperature 36.3 °C (97.4 °F), temperature source Temporal, resp. rate 16, height 1.626 m (5' 4\"), weight 86.4 kg (190 lb 7.6 oz), last menstrual period 01/01/1993, SpO2 94 %, not currently breastfeeding.          No acute distress  Dressing clean dry and intact  Neurovascularly intact      Plan:  Doing well  Discharge home.            "

## 2019-01-18 NOTE — PROGRESS NOTES
CNA reported pt's low BP at 80/43. P sitting up in chair, A&Ox4, no reports of dizziness or lightheadedness. Jinny Edwards PA-C paged @2816, new orders received at 8206.

## 2019-01-18 NOTE — RESPIRATORY CARE
COPD EDUCATION by COPD CLINICAL EDUCATOR  1/18/2019 at 7:09 AM by Michelle Bolaños     Patient reviewed by COPD education team. Patient does not qualify for COPD program.

## 2019-01-21 ENCOUNTER — PATIENT OUTREACH (OUTPATIENT)
Dept: HEALTH INFORMATION MANAGEMENT | Facility: OTHER | Age: 79
End: 2019-01-21

## 2019-01-21 ENCOUNTER — TELEPHONE (OUTPATIENT)
Dept: HEALTH INFORMATION MANAGEMENT | Facility: OTHER | Age: 79
End: 2019-01-21

## 2019-01-22 ENCOUNTER — APPOINTMENT (OUTPATIENT)
Dept: RADIOLOGY | Facility: MEDICAL CENTER | Age: 79
End: 2019-01-22
Attending: EMERGENCY MEDICINE
Payer: MEDICARE

## 2019-01-22 ENCOUNTER — HOSPITAL ENCOUNTER (EMERGENCY)
Facility: MEDICAL CENTER | Age: 79
End: 2019-01-22
Attending: EMERGENCY MEDICINE
Payer: MEDICARE

## 2019-01-22 VITALS
OXYGEN SATURATION: 94 % | HEART RATE: 69 BPM | RESPIRATION RATE: 18 BRPM | BODY MASS INDEX: 31.58 KG/M2 | WEIGHT: 185 LBS | TEMPERATURE: 97.7 F | DIASTOLIC BLOOD PRESSURE: 84 MMHG | HEIGHT: 64 IN | SYSTOLIC BLOOD PRESSURE: 107 MMHG

## 2019-01-22 DIAGNOSIS — G89.18 POSTOPERATIVE PAIN: ICD-10-CM

## 2019-01-22 DIAGNOSIS — M79.89 RIGHT LEG SWELLING: ICD-10-CM

## 2019-01-22 DIAGNOSIS — M79.604 RIGHT LEG PAIN: ICD-10-CM

## 2019-01-22 PROCEDURE — A9270 NON-COVERED ITEM OR SERVICE: HCPCS | Performed by: EMERGENCY MEDICINE

## 2019-01-22 PROCEDURE — 700102 HCHG RX REV CODE 250 W/ 637 OVERRIDE(OP): Performed by: EMERGENCY MEDICINE

## 2019-01-22 PROCEDURE — 73610 X-RAY EXAM OF ANKLE: CPT | Mod: RT

## 2019-01-22 PROCEDURE — 93971 EXTREMITY STUDY: CPT | Mod: RT

## 2019-01-22 PROCEDURE — 73560 X-RAY EXAM OF KNEE 1 OR 2: CPT | Mod: RT

## 2019-01-22 PROCEDURE — 73501 X-RAY EXAM HIP UNI 1 VIEW: CPT | Mod: RT

## 2019-01-22 PROCEDURE — 99284 EMERGENCY DEPT VISIT MOD MDM: CPT

## 2019-01-22 RX ORDER — OXYCODONE HYDROCHLORIDE 5 MG/1
5 TABLET ORAL ONCE
Status: COMPLETED | OUTPATIENT
Start: 2019-01-22 | End: 2019-01-22

## 2019-01-22 RX ADMIN — OXYCODONE HYDROCHLORIDE 5 MG: 5 TABLET ORAL at 14:30

## 2019-01-22 NOTE — ED NOTES
Pt to yellow 67, assisted to restroom, very limited mobility due to swelling and pain right leg. Up for eval

## 2019-01-22 NOTE — ED NOTES
.Patient states understanding of discharge instructions. Patient taken out of ED via wheelchair by tech with spouse. Personal belongings with patient.

## 2019-01-22 NOTE — TELEPHONE ENCOUNTER
SC[facility] post discharge med rec completed. No clinically significant medication issues noted. Unable to reach patient for f/u. Left  for patient to call [phone].

## 2019-01-22 NOTE — ED TRIAGE NOTES
Chief Complaint   Patient presents with   • Back Pain     pt is s/p hip replacement and recently developed pain to the R side.  denies trauma   • Knee Pain   • Ankle Pain

## 2019-01-22 NOTE — ED PROVIDER NOTES
"ED Provider Note    CHIEF COMPLAINT  Chief Complaint   Patient presents with   • Back Pain     pt is s/p hip replacement and recently developed pain to the R side.  denies trauma   • Knee Pain   • Ankle Pain       HPI  Donte Magaña is a 78 y.o. female who presents with right hip pain, right ankle and sliding out of bed to the ground.  Her  was unable to get her up for at least 1 hour, when her son arrived, they were able to get her back in bed.  She had her right hip operated on last week, is concerned about the hip itself given the pain.  She has had swelling in her right leg, she is concerned it may be out of the ordinary.  No redness, no fever.  No head or neck injury.  No abdominal pain.  No shortness of breath.    REVIEW OF SYSTEMS  Ear nose throat: No facial pain  Respiratory: No shortness of breath  Gastrointestinal: No abdominal pain  Musculoskeletal: Right hip, knee, and ankle pain  Neurologic: No headache  Skin: Swelling right leg          PAST MEDICAL HISTORY  Past Medical History:   Diagnosis Date   • A-fib (HCC)    • Anesthesia     \"Tachycardia for 5 days after cataract surgery\"   • Anticoagulant long-term use 1/12/2012   • Arthritis     Knees, hips   • Asthma     with pneumonia, nothing for 3 years   • Atrial fibrillation (HCC)    • Backpain     R hip   • Bowel habit changes     diarrhea   • Breath shortness     with exertion O2  2l/m PRN, hasnt used for 3 years   • Bronchitis Nov, 2013   • CAD (coronary artery disease)    • Depression    • Glaucoma 5/3/2011   • Hematoma complicating a procedure 11/3/2012   • Hemorrhagic disorder (HCC)     bruising/coumadin   • High cholesterol    • Hypertension    • Hypothyroid    • Lupus    • Macular degeneration    • Menopause 1/12/2012   • Mitral regurgitation 10/30/2012   • Obesity 1/12/2012   • Pacemaker 2018   • Pneumonia feb,2013   • Pre-syncope 6/29/2018   • Pulmonary hypertension (HCC) 10/30/2012   • PVC's (premature ventricular contractions) " 1/12/2012   • Senile nuclear sclerosis    • Spinal stenosis of lumbar region at multiple levels    • Unspecified cataract     repaired bilateral   • Unspecified urinary incontinence        FAMILY HISTORY  Family History   Problem Relation Age of Onset   • Stroke Mother    • Diabetes Father    • Stroke Sister    • Heart Disease Brother    • Stroke Sister    • GI Daughter         Crohn's Disease   • Heart Disease Daughter         CHF   • Other Daughter         Chronic Pain--Lymphedema   • Cancer Paternal Aunt        SOCIAL HISTORY  Social History     Social History   • Marital status:      Spouse name: N/A   • Number of children: N/A   • Years of education: N/A     Social History Main Topics   • Smoking status: Never Smoker   • Smokeless tobacco: Never Used   • Alcohol use No   • Drug use: No   • Sexual activity: Not Currently     Partners: Male     Birth control/ protection: Post-Menopausal     Other Topics Concern   • Not on file     Social History Narrative   • No narrative on file       SURGICAL HISTORY  Past Surgical History:   Procedure Laterality Date   • HIP ARTH ANTERIOR TOTAL Right 1/17/2019    Procedure: HIP ARTHROPLASTY ANTERIOR TOTAL;  Surgeon: Juan C Mercedes M.D.;  Location: [facility];  Service: Orthopedics   • PACEMAKER INSERTION  06/30/2018    Dual Chamber   • KNEE ARTHROPLASTY TOTAL Right 6/23/2016    Procedure: KNEE ARTHROPLASTY TOTAL;  Surgeon: Heriberto Lozada M.D.;  Location: [facility];  Service:    • KNEE ARTHROPLASTY TOTAL Left 5/28/2015    Procedure: KNEE ARTHROPLASTY TOTAL;  Surgeon: Heriberto Lozada M.D.;  Location: [facility];  Service:    • CATARACT PHACO WITH IOL Right 5/5/2015    Procedure: IOL OD - STANDARD;  Surgeon: Dmitry Bejarano M.D.;  Location: [facility];  Service:    • CATARACT PHACO WITH IOL  4/21/2015    Performed by Dmitry Bejarano M.D. at [facility]   • RECOVERY  11/30/2010    Performed by  SURGERY, CATH-RECOVERY at SURGERY SAME DAY [facility]   • COLONOSCOPY  2008    Normal    [facility]   • ABDOMINAL HYSTERECTOMY TOTAL  April 15,1975    still has ovaries   • OPEN REDUCTION      left ankle   • OTHER      Removed pins from left ankle   • OTHER CARDIAC SURGERY  12/2017 and 07/19/2018     Cardiac Ablation   • TONSILLECTOMY AND ADENOIDECTOMY         CURRENT MEDICATIONS  No current facility-administered medications on file prior to encounter.      Current Outpatient Prescriptions on File Prior to Encounter   Medication Sig Dispense Refill   • oxyCODONE immediate-release (ROXICODONE) 5 MG Tab Take 1 Tab by mouth every four hours as needed for up to 14 days. 30 Tab 0   • furosemide (LASIX) 40 MG Tab Take 80 mg by mouth every day.     • enoxaparin (LOVENOX) 80 MG/0.8ML Solution inj Inject 80 mg as instructed every 12 hours. 16 Syringe 2   • spironolactone (ALDACTONE) 50 MG Tab TAKE  ONE TABLET BY MOUTH EVERY MORNING AND EVERY EVENING 180 Tab 3   • Mirabegron ER 50 MG TABLET SR 24 HR Take 1 Tab by mouth every day. 30 Tab 3   • magnesium oxide (MAG-OX) 400 (241.3 Mg) MG Tab tablet Take 1 Tab by mouth every day. 30 Tab 6   • PREBIOTIC PRODUCT PO Take 2 Tabs by mouth every day.     • terconazole (TERAZOL 3) 0.8 % vaginal cream Apply 2 times a day to yeast infection (Patient taking differently: 1 Applicator 2 Times a Day. Feet and butt) 20 g 1   • bacitracin-polymyxin b (POLYSPORIN) 500-04738 UNIT/GM Ointment Apply 1 Each to affected area(s) 2 times a day. 1 Tube 0   • ipratropium (ATROVENT) 0.03 % Solution Spray 2 Sprays in nose every 12 hours. 1 Bottle 1   • sertraline (ZOLOFT) 50 MG Tab Take 1 Tab by mouth every day. 90 Tab 3   • Lutein 20 MG Cap Take 1 Cap by mouth every day. 90 Cap 3   • atenolol (TENORMIN) 50 MG Tab Take 1 Tab by mouth 2 times a day. 180 Tab 1   • levothyroxine (SYNTHROID) 50 MCG Tab TAKE 1 TABLET BY MOUTH EVERY MORNING ON AN EMPTY STOMACH. 90 Tab 3   • raNITidine (ZANTAC) 150 MG Tab  "TAKE ONE TABLET BY MOUTH TWICE DAILY 180 Tab 3   • lisinopril (PRINIVIL) 5 MG Tab TAKE ONE TABLET BY MOUTH EVERY DAY 90 Tab 3   • estradiol (ESTRACE) 2 MG Tab TAKE ONE TABLET BY MOUTH EVERY DAY 90 Tab 3   • acetaminophen (TYLENOL) 500 MG TABS Take 1,000 mg by mouth 3 times a day. Indications: Pain     • vitamin D (CHOLECALCIFEROL) 1000 UNIT TABS Take 2,000 Units by mouth every day.         ALLERGIES  Allergies   Allergen Reactions   • Amiodarone Hives     Throat and tongue itching   • Bactrim Shortness of Breath   • Claritin-D [Loratadine-Pseudoephedrine] Palpitations   • Metoprolol      Causes throat swelling   • Morphine      Hallucinations   • Qvar [Beclomethasone Dipropionate]      Pressure on heart     • Vibramycin Shortness of Breath   • Atorvastatin Calcium-Polysorbate 80      Muscle aches     • Augmentin      Unknown reaction   • Cipro Xr Swelling   • Diltiazem      rash   • Flecainide      dizziness   • Keflex Unspecified     Pt states \"Unsure\".   • Mucinex      GI Distress     • Tramadol      crying   • Phytoplex Z-Guard [Petrolatum-Zinc Oxide]      \"causes burning\"   • Atorvastatin Myalgia   • Tape Rash     Paper tape okay       PHYSICAL EXAM  VITAL SIGNS: /84   Pulse 69   Temp 36.5 °C (97.7 °F) (Temporal)   Resp 18   Ht 1.626 m (5' 4\")   Wt 83.9 kg (185 lb)   LMP 01/01/1993   SpO2 94%   BMI 31.76 kg/m²    Constitutional: Well-nourished, no distress  HENT: Atraumatic  Eyes: Pupils are equal 3 millimeters, Conjunctiva normal, No discharge.   Neck: Nontender  Cardiovascular: Normal heart rate, no murmur  Pulmonary: Equal  breath sounds, No wheezing or rales.  Normal air movement  GI: Soft and nontender  Skin: Healing incision right leg.  No cellulitic change.  Moderate to severe swelling right leg compared to the left  Vascular: Normal capillary refill all extremities.  Pulses present in both feet  Musculoskeletal: Tenderness right lateral hip, right anterior knee, and right lateral ankle.  No " deformity.  Neurologic: Sensation intact right foot    RADIOLOGY/PROCEDURES  US-EXTREMITY VENOUS LOWER UNILAT RIGHT   Final Result      DX-HIP-UNILATERAL-WITH PELVIS-1 VIEW RIGHT   Final Result         1. Right total hip arthroplasty as described. No dislocation or acute periprosthetic fracture.   2. A 4.1 cm greater trochanteric fragment superolateral to the arthroplasty.      DX-KNEE 2- RIGHT   Final Result      1.  Right hip hemiarthroplasty in place. No acute fracture or dislocation.      2.  Small knee effusion. No lipohemarthrosis.      3.  Old proximal left fibular metaphyseal fracture deformity.      DX-ANKLE 3+ VIEWS RIGHT   Final Result      1.  No RIGHT ankle fracture or dislocation.      2.  Mild diffuse soft tissue swelling at the distal right lower extremity and right ankle.          \    COURSE & MEDICAL DECISION MAKING  Pertinent Labs & Imaging studies reviewed. (See chart for details)  No evidence of DVT by ultrasound, patient continues on home Lovenox shots.  X-rays did not show acute fracture.  Patient will continue on her medication, and follow-up with orthopedics as soon as possible.  Is discharged to care of her  and family in good condition    FINAL IMPRESSION     1. Right leg pain    2. Right leg swelling    3. Postoperative pain              Electronically signed by: Abhinav Durant, 1/23/2019

## 2019-01-30 ENCOUNTER — ANTICOAGULATION VISIT (OUTPATIENT)
Dept: VASCULAR LAB | Facility: MEDICAL CENTER | Age: 79
End: 2019-01-30
Attending: INTERNAL MEDICINE
Payer: MEDICARE

## 2019-01-30 DIAGNOSIS — Z79.01 CHRONIC ANTICOAGULATION: ICD-10-CM

## 2019-01-30 LAB — INR PPP: 1.6 (ref 2–3.5)

## 2019-01-30 PROCEDURE — 85610 PROTHROMBIN TIME: CPT

## 2019-01-30 PROCEDURE — 99212 OFFICE O/P EST SF 10 MIN: CPT | Performed by: NURSE PRACTITIONER

## 2019-01-30 NOTE — PROGRESS NOTES
Anticoagulation Summary  As of 2019    INR goal:   2.0-3.0   TTR:   73.4 % (3.5 y)   INR used for dosin.6! (2019)   Warfarin maintenance plan:   3.75 mg (2.5 mg x 1.5) every Mon; 2.5 mg (2.5 mg x 1) all other days   Weekly warfarin total:   18.75 mg   Plan last modified:   Ivone Barraza, PharmD (2018)   Next INR check:   2019   Target end date:   Indefinite    Indications    Atrial fibrillation (HCC) (Resolved) [I48.91]  Chronic anticoagulation [Z79.01]             Anticoagulation Episode Summary     INR check location:   Home Draw    Preferred lab:       Send INR reminders to:       Comments:   Winston       Anticoagulation Care Providers     Provider Role Specialty Phone number    Lizzy Haywood M.D. Referring Cardiology [phone]    [facility] Anticoagulation Services Responsible  [phone]    Vladimir Taylor, PharmD Responsible          Anticoagulation Patient Findings      HPI:  Donte Willoughbyspeth seen in clinic today for follow up on anticoagulation therapy in the presence of AF. Pt recently had THR. Has a home monitor but having trouble with errors. Denies any medication or diet changes. No current symptoms of bleeding or thrombosis reported.    A/P:   INR subtherapeutic. INR 1.6 on clinic machine and hm. Demonstrated proper technique for retrieving blood sample. Pt verbalizes understanding. INR low. Will increase two doses then continue current regimen. Low CHADS 2 score.     Follow up appointment in 1 week(s).    Next Appointment: 2019 - home monitor.     Tati FERGUSON

## 2019-01-31 LAB — INR BLD: 1.6 (ref 0.9–1.2)

## 2019-02-04 RX ORDER — ESTRADIOL 2 MG/1
TABLET ORAL
Qty: 90 TAB | Refills: 3 | Status: SHIPPED | OUTPATIENT
Start: 2019-02-04 | End: 2020-02-10

## 2019-02-05 ENCOUNTER — ANTICOAGULATION MONITORING (OUTPATIENT)
Dept: VASCULAR LAB | Facility: MEDICAL CENTER | Age: 79
End: 2019-02-05

## 2019-02-05 DIAGNOSIS — Z79.01 CHRONIC ANTICOAGULATION: ICD-10-CM

## 2019-02-05 LAB — INR PPP: 1.8 (ref 2–3.5)

## 2019-02-05 NOTE — PROGRESS NOTES
Anticoagulation Summary  As of 2019    INR goal:   2.0-3.0   TTR:   73.1 % (3.6 y)   INR used for dosin.8! (2019)   Warfarin maintenance plan:   3.75 mg (2.5 mg x 1.5) every Mon, Thu; 2.5 mg (2.5 mg x 1) all other days   Weekly warfarin total:   20 mg   Plan last modified:   Massiel Coleman (2019)   Next INR check:   2019   Target end date:   Indefinite    Indications    Atrial fibrillation (HCC) (Resolved) [I48.91]  Chronic anticoagulation [Z79.01]             Anticoagulation Episode Summary     INR check location:   Home Draw    Preferred lab:       Send INR reminders to:       Comments:   Winston YEAGER      Anticoagulation Care Providers     Provider Role Specialty Phone number    Lizzy Haywood M.D. Referring Cardiology [phone]    [facility] Anticoagulation Services Responsible  [phone]    Vladimir Taylor, PharmD Responsible          Anticoagulation Patient Findings    Spoke with Mr Magaña to report a sub therapeutic INR of 1.8.  Will increase weekly dose by 7%. Follow up in 1 weeks, to reduce the risk of adverse events related to this high risk medication, warfarin.    Massiel Coleman, Clinical Pharmacist

## 2019-02-12 ENCOUNTER — ANTICOAGULATION MONITORING (OUTPATIENT)
Dept: VASCULAR LAB | Facility: MEDICAL CENTER | Age: 79
End: 2019-02-12

## 2019-02-12 DIAGNOSIS — Z79.01 CHRONIC ANTICOAGULATION: ICD-10-CM

## 2019-02-12 LAB — INR PPP: 2.1 (ref 2–3.5)

## 2019-02-12 NOTE — PROGRESS NOTES
Anticoagulation Summary  As of 2019    INR goal:   2.0-3.0   TTR:   72.9 % (3.6 y)   INR used for dosin.1 (2019)   Warfarin maintenance plan:   3.75 mg (2.5 mg x 1.5) every Mon, Thu; 2.5 mg (2.5 mg x 1) all other days   Weekly warfarin total:   20 mg   Plan last modified:   Massiel Coleman (2019)   Next INR check:   2019   Target end date:   Indefinite    Indications    Atrial fibrillation (HCC) (Resolved) [I48.91]  Chronic anticoagulation [Z79.01]             Anticoagulation Episode Summary     INR check location:   Home Draw    Preferred lab:       Send INR reminders to:       Comments:   Winston YEAGER      Anticoagulation Care Providers     Provider Role Specialty Phone number    Lizzy Haywood M.D. Referring Cardiology [phone]    [facility] Anticoagulation Services Responsible  [phone]    Vladimir Taylor, PharmD Responsible          Anticoagulation Patient Findings    .spoke with Mr. Magaña to report a therapeutic INR of 2.1. Continue current dosing regimen.  Follow up in 2 weeks, to reduce the risk of adverse events related to this high risk medication, warfarin.    Massiel Coleman, Clinical Pharmacist

## 2019-02-13 ENCOUNTER — APPOINTMENT (OUTPATIENT)
Dept: RADIOLOGY | Facility: MEDICAL CENTER | Age: 79
DRG: 920 | End: 2019-02-13
Attending: EMERGENCY MEDICINE
Payer: MEDICARE

## 2019-02-13 ENCOUNTER — HOSPITAL ENCOUNTER (INPATIENT)
Facility: MEDICAL CENTER | Age: 79
LOS: 3 days | DRG: 920 | End: 2019-02-16
Attending: EMERGENCY MEDICINE | Admitting: HOSPITALIST
Payer: MEDICARE

## 2019-02-13 DIAGNOSIS — L03.90 CELLULITIS, UNSPECIFIED CELLULITIS SITE: ICD-10-CM

## 2019-02-13 DIAGNOSIS — L02.91 ABSCESS: ICD-10-CM

## 2019-02-13 DIAGNOSIS — M25.551 RIGHT HIP PAIN: ICD-10-CM

## 2019-02-13 DIAGNOSIS — T81.30XA WOUND DEHISCENCE: ICD-10-CM

## 2019-02-13 PROBLEM — R60.9 PERIPHERAL EDEMA: Status: ACTIVE | Noted: 2019-02-13

## 2019-02-13 PROBLEM — R60.0 PERIPHERAL EDEMA: Status: ACTIVE | Noted: 2019-02-13

## 2019-02-13 LAB
ANION GAP SERPL CALC-SCNC: 10 MMOL/L (ref 0–11.9)
BASOPHILS # BLD AUTO: 0.5 % (ref 0–1.8)
BASOPHILS # BLD: 0.06 K/UL (ref 0–0.12)
BUN SERPL-MCNC: 20 MG/DL (ref 8–22)
CALCIUM SERPL-MCNC: 9.3 MG/DL (ref 8.5–10.5)
CHLORIDE SERPL-SCNC: 99 MMOL/L (ref 96–112)
CO2 SERPL-SCNC: 27 MMOL/L (ref 20–33)
CREAT SERPL-MCNC: 1.03 MG/DL (ref 0.5–1.4)
CRP SERPL HS-MCNC: 2.6 MG/DL (ref 0–0.75)
EOSINOPHIL # BLD AUTO: 0.29 K/UL (ref 0–0.51)
EOSINOPHIL NFR BLD: 2.6 % (ref 0–6.9)
ERYTHROCYTE [DISTWIDTH] IN BLOOD BY AUTOMATED COUNT: 49.8 FL (ref 35.9–50)
GLUCOSE SERPL-MCNC: 95 MG/DL (ref 65–99)
HCT VFR BLD AUTO: 42.4 % (ref 37–47)
HGB BLD-MCNC: 13.8 G/DL (ref 12–16)
IMM GRANULOCYTES # BLD AUTO: 0.05 K/UL (ref 0–0.11)
IMM GRANULOCYTES NFR BLD AUTO: 0.4 % (ref 0–0.9)
LACTATE BLD-SCNC: 1.5 MMOL/L (ref 0.5–2)
LYMPHOCYTES # BLD AUTO: 2.88 K/UL (ref 1–4.8)
LYMPHOCYTES NFR BLD: 25.9 % (ref 22–41)
MCH RBC QN AUTO: 32.5 PG (ref 27–33)
MCHC RBC AUTO-ENTMCNC: 32.5 G/DL (ref 33.6–35)
MCV RBC AUTO: 99.8 FL (ref 81.4–97.8)
MONOCYTES # BLD AUTO: 0.88 K/UL (ref 0–0.85)
MONOCYTES NFR BLD AUTO: 7.9 % (ref 0–13.4)
NEUTROPHILS # BLD AUTO: 6.98 K/UL (ref 2–7.15)
NEUTROPHILS NFR BLD: 62.7 % (ref 44–72)
NRBC # BLD AUTO: 0 K/UL
NRBC BLD-RTO: 0 /100 WBC
PLATELET # BLD AUTO: 370 K/UL (ref 164–446)
PMV BLD AUTO: 9.2 FL (ref 9–12.9)
POTASSIUM SERPL-SCNC: 4.4 MMOL/L (ref 3.6–5.5)
RBC # BLD AUTO: 4.25 M/UL (ref 4.2–5.4)
SODIUM SERPL-SCNC: 136 MMOL/L (ref 135–145)
TSH SERPL DL<=0.005 MIU/L-ACNC: 1.33 UIU/ML (ref 0.38–5.33)
WBC # BLD AUTO: 11.1 K/UL (ref 4.8–10.8)

## 2019-02-13 PROCEDURE — 36415 COLL VENOUS BLD VENIPUNCTURE: CPT

## 2019-02-13 PROCEDURE — 96374 THER/PROPH/DIAG INJ IV PUSH: CPT

## 2019-02-13 PROCEDURE — 99285 EMERGENCY DEPT VISIT HI MDM: CPT

## 2019-02-13 PROCEDURE — 99223 1ST HOSP IP/OBS HIGH 75: CPT | Performed by: HOSPITALIST

## 2019-02-13 PROCEDURE — 700117 HCHG RX CONTRAST REV CODE 255: Performed by: EMERGENCY MEDICINE

## 2019-02-13 PROCEDURE — 94760 N-INVAS EAR/PLS OXIMETRY 1: CPT

## 2019-02-13 PROCEDURE — 84443 ASSAY THYROID STIM HORMONE: CPT

## 2019-02-13 PROCEDURE — 770006 HCHG ROOM/CARE - MED/SURG/GYN SEMI*

## 2019-02-13 PROCEDURE — 700111 HCHG RX REV CODE 636 W/ 250 OVERRIDE (IP): Performed by: EMERGENCY MEDICINE

## 2019-02-13 PROCEDURE — 80048 BASIC METABOLIC PNL TOTAL CA: CPT

## 2019-02-13 PROCEDURE — 85610 PROTHROMBIN TIME: CPT

## 2019-02-13 PROCEDURE — 96376 TX/PRO/DX INJ SAME DRUG ADON: CPT

## 2019-02-13 PROCEDURE — 96375 TX/PRO/DX INJ NEW DRUG ADDON: CPT

## 2019-02-13 PROCEDURE — 73701 CT LOWER EXTREMITY W/DYE: CPT | Mod: RT

## 2019-02-13 PROCEDURE — 85025 COMPLETE CBC W/AUTO DIFF WBC: CPT

## 2019-02-13 PROCEDURE — 83605 ASSAY OF LACTIC ACID: CPT

## 2019-02-13 PROCEDURE — 83036 HEMOGLOBIN GLYCOSYLATED A1C: CPT

## 2019-02-13 PROCEDURE — 86140 C-REACTIVE PROTEIN: CPT

## 2019-02-13 RX ORDER — SPIRONOLACTONE 25 MG/1
50 TABLET ORAL
Status: DISCONTINUED | OUTPATIENT
Start: 2019-02-14 | End: 2019-02-16 | Stop reason: HOSPADM

## 2019-02-13 RX ORDER — BISACODYL 10 MG
10 SUPPOSITORY, RECTAL RECTAL
Status: DISCONTINUED | OUTPATIENT
Start: 2019-02-13 | End: 2019-02-16 | Stop reason: HOSPADM

## 2019-02-13 RX ORDER — FUROSEMIDE 40 MG/1
80 TABLET ORAL DAILY
Status: DISCONTINUED | OUTPATIENT
Start: 2019-02-14 | End: 2019-02-16 | Stop reason: HOSPADM

## 2019-02-13 RX ORDER — POLYETHYLENE GLYCOL 3350 17 G/17G
1 POWDER, FOR SOLUTION ORAL
Status: DISCONTINUED | OUTPATIENT
Start: 2019-02-13 | End: 2019-02-16 | Stop reason: HOSPADM

## 2019-02-13 RX ORDER — ONDANSETRON 2 MG/ML
4 INJECTION INTRAMUSCULAR; INTRAVENOUS ONCE
Status: COMPLETED | OUTPATIENT
Start: 2019-02-13 | End: 2019-02-13

## 2019-02-13 RX ORDER — ONDANSETRON 4 MG/1
4 TABLET, ORALLY DISINTEGRATING ORAL EVERY 4 HOURS PRN
Status: DISCONTINUED | OUTPATIENT
Start: 2019-02-13 | End: 2019-02-16 | Stop reason: HOSPADM

## 2019-02-13 RX ORDER — HYDROMORPHONE HYDROCHLORIDE 1 MG/ML
0.5 INJECTION, SOLUTION INTRAMUSCULAR; INTRAVENOUS; SUBCUTANEOUS
Status: DISCONTINUED | OUTPATIENT
Start: 2019-02-13 | End: 2019-02-16 | Stop reason: HOSPADM

## 2019-02-13 RX ORDER — OXYCODONE HYDROCHLORIDE 5 MG/1
5 TABLET ORAL
Status: DISCONTINUED | OUTPATIENT
Start: 2019-02-13 | End: 2019-02-16 | Stop reason: HOSPADM

## 2019-02-13 RX ORDER — ONDANSETRON 2 MG/ML
4 INJECTION INTRAMUSCULAR; INTRAVENOUS EVERY 4 HOURS PRN
Status: DISCONTINUED | OUTPATIENT
Start: 2019-02-13 | End: 2019-02-16 | Stop reason: HOSPADM

## 2019-02-13 RX ORDER — OXYCODONE HYDROCHLORIDE 10 MG/1
10 TABLET ORAL
Status: DISCONTINUED | OUTPATIENT
Start: 2019-02-13 | End: 2019-02-16 | Stop reason: HOSPADM

## 2019-02-13 RX ORDER — LEVOTHYROXINE SODIUM 0.03 MG/1
50 TABLET ORAL EVERY MORNING
Status: DISCONTINUED | OUTPATIENT
Start: 2019-02-14 | End: 2019-02-16 | Stop reason: HOSPADM

## 2019-02-13 RX ORDER — LISINOPRIL 2.5 MG/1
5 TABLET ORAL DAILY
Status: DISCONTINUED | OUTPATIENT
Start: 2019-02-14 | End: 2019-02-16 | Stop reason: HOSPADM

## 2019-02-13 RX ORDER — HYDROMORPHONE HYDROCHLORIDE 1 MG/ML
0.5 INJECTION, SOLUTION INTRAMUSCULAR; INTRAVENOUS; SUBCUTANEOUS ONCE
Status: COMPLETED | OUTPATIENT
Start: 2019-02-13 | End: 2019-02-13

## 2019-02-13 RX ORDER — SODIUM CHLORIDE 9 MG/ML
INJECTION, SOLUTION INTRAVENOUS CONTINUOUS
Status: DISCONTINUED | OUTPATIENT
Start: 2019-02-13 | End: 2019-02-15

## 2019-02-13 RX ORDER — RANITIDINE 150 MG/1
150 TABLET ORAL DAILY
Status: DISCONTINUED | OUTPATIENT
Start: 2019-02-14 | End: 2019-02-14

## 2019-02-13 RX ORDER — AMOXICILLIN 250 MG
2 CAPSULE ORAL 2 TIMES DAILY
Status: DISCONTINUED | OUTPATIENT
Start: 2019-02-14 | End: 2019-02-16 | Stop reason: HOSPADM

## 2019-02-13 RX ORDER — ESTRADIOL 1 MG/1
2 TABLET ORAL DAILY
Status: DISCONTINUED | OUTPATIENT
Start: 2019-02-14 | End: 2019-02-16 | Stop reason: HOSPADM

## 2019-02-13 RX ORDER — ATENOLOL 50 MG/1
50 TABLET ORAL 2 TIMES DAILY
Status: DISCONTINUED | OUTPATIENT
Start: 2019-02-13 | End: 2019-02-16 | Stop reason: HOSPADM

## 2019-02-13 RX ADMIN — ONDANSETRON 4 MG: 2 INJECTION INTRAMUSCULAR; INTRAVENOUS at 20:15

## 2019-02-13 RX ADMIN — HYDROMORPHONE HYDROCHLORIDE 0.5 MG: 1 INJECTION, SOLUTION INTRAMUSCULAR; INTRAVENOUS; SUBCUTANEOUS at 21:50

## 2019-02-13 RX ADMIN — IOHEXOL 80 ML: 350 INJECTION, SOLUTION INTRAVENOUS at 21:44

## 2019-02-13 RX ADMIN — HYDROMORPHONE HYDROCHLORIDE 0.5 MG: 1 INJECTION, SOLUTION INTRAMUSCULAR; INTRAVENOUS; SUBCUTANEOUS at 20:15

## 2019-02-13 ASSESSMENT — LIFESTYLE VARIABLES: SUBSTANCE_ABUSE: 0

## 2019-02-13 ASSESSMENT — ENCOUNTER SYMPTOMS
FLANK PAIN: 0
BLURRED VISION: 0
SPEECH CHANGE: 0
ABDOMINAL PAIN: 0
FOCAL WEAKNESS: 0
CHILLS: 0
SHORTNESS OF BREATH: 0
SENSORY CHANGE: 0
DIZZINESS: 0
NAUSEA: 0
BACK PAIN: 1
FEVER: 0
VOMITING: 0
PALPITATIONS: 0
HALLUCINATIONS: 0
WEAKNESS: 0
EYE DISCHARGE: 0
BRUISES/BLEEDS EASILY: 0
HEMOPTYSIS: 0
DOUBLE VISION: 0
DEPRESSION: 0
HEARTBURN: 0
COUGH: 0
MYALGIAS: 0

## 2019-02-14 LAB
ALBUMIN SERPL BCP-MCNC: 3.5 G/DL (ref 3.2–4.9)
ALBUMIN/GLOB SERPL: 1.2 G/DL
ALP SERPL-CCNC: 227 U/L (ref 30–99)
ALT SERPL-CCNC: 6 U/L (ref 2–50)
ANION GAP SERPL CALC-SCNC: 5 MMOL/L (ref 0–11.9)
AST SERPL-CCNC: 9 U/L (ref 12–45)
BASOPHILS # BLD AUTO: 0.5 % (ref 0–1.8)
BASOPHILS # BLD: 0.05 K/UL (ref 0–0.12)
BILIRUB SERPL-MCNC: 0.5 MG/DL (ref 0.1–1.5)
BUN SERPL-MCNC: 16 MG/DL (ref 8–22)
CALCIUM SERPL-MCNC: 9.3 MG/DL (ref 8.5–10.5)
CHLORIDE SERPL-SCNC: 102 MMOL/L (ref 96–112)
CHOLEST SERPL-MCNC: 151 MG/DL (ref 100–199)
CO2 SERPL-SCNC: 28 MMOL/L (ref 20–33)
CREAT SERPL-MCNC: 0.96 MG/DL (ref 0.5–1.4)
EOSINOPHIL # BLD AUTO: 0.43 K/UL (ref 0–0.51)
EOSINOPHIL NFR BLD: 3.9 % (ref 0–6.9)
ERYTHROCYTE [DISTWIDTH] IN BLOOD BY AUTOMATED COUNT: 50.3 FL (ref 35.9–50)
EST. AVERAGE GLUCOSE BLD GHB EST-MCNC: 105 MG/DL
GLOBULIN SER CALC-MCNC: 2.9 G/DL (ref 1.9–3.5)
GLUCOSE SERPL-MCNC: 109 MG/DL (ref 65–99)
GRAM STN SPEC: NORMAL
HBA1C MFR BLD: 5.3 % (ref 0–5.6)
HCT VFR BLD AUTO: 40.7 % (ref 37–47)
HDLC SERPL-MCNC: 45 MG/DL
HGB BLD-MCNC: 12.9 G/DL (ref 12–16)
IMM GRANULOCYTES # BLD AUTO: 0.05 K/UL (ref 0–0.11)
IMM GRANULOCYTES NFR BLD AUTO: 0.5 % (ref 0–0.9)
INR PPP: 2.2 (ref 0.87–1.13)
LDLC SERPL CALC-MCNC: 82 MG/DL
LYMPHOCYTES # BLD AUTO: 3.43 K/UL (ref 1–4.8)
LYMPHOCYTES NFR BLD: 31.2 % (ref 22–41)
MCH RBC QN AUTO: 32.2 PG (ref 27–33)
MCHC RBC AUTO-ENTMCNC: 31.7 G/DL (ref 33.6–35)
MCV RBC AUTO: 101.5 FL (ref 81.4–97.8)
MONOCYTES # BLD AUTO: 1.02 K/UL (ref 0–0.85)
MONOCYTES NFR BLD AUTO: 9.3 % (ref 0–13.4)
NEUTROPHILS # BLD AUTO: 6.03 K/UL (ref 2–7.15)
NEUTROPHILS NFR BLD: 54.6 % (ref 44–72)
NRBC # BLD AUTO: 0 K/UL
NRBC BLD-RTO: 0 /100 WBC
PLATELET # BLD AUTO: 334 K/UL (ref 164–446)
PMV BLD AUTO: 9.3 FL (ref 9–12.9)
POTASSIUM SERPL-SCNC: 4.6 MMOL/L (ref 3.6–5.5)
PROT SERPL-MCNC: 6.4 G/DL (ref 6–8.2)
PROTHROMBIN TIME: 24.5 SEC (ref 12–14.6)
RBC # BLD AUTO: 4.01 M/UL (ref 4.2–5.4)
SIGNIFICANT IND 70042: NORMAL
SITE SITE: NORMAL
SODIUM SERPL-SCNC: 135 MMOL/L (ref 135–145)
SOURCE SOURCE: NORMAL
TRIGL SERPL-MCNC: 122 MG/DL (ref 0–149)
WBC # BLD AUTO: 11 K/UL (ref 4.8–10.8)

## 2019-02-14 PROCEDURE — 770006 HCHG ROOM/CARE - MED/SURG/GYN SEMI*

## 2019-02-14 PROCEDURE — 160035 HCHG PACU - 1ST 60 MINS PHASE I: Performed by: ORTHOPAEDIC SURGERY

## 2019-02-14 PROCEDURE — 0JBL3ZZ EXCISION OF RIGHT UPPER LEG SUBCUTANEOUS TISSUE AND FASCIA, PERCUTANEOUS APPROACH: ICD-10-PCS | Performed by: ORTHOPAEDIC SURGERY

## 2019-02-14 PROCEDURE — 160038 HCHG SURGERY MINUTES - EA ADDL 1 MIN LEVEL 2: Performed by: ORTHOPAEDIC SURGERY

## 2019-02-14 PROCEDURE — A9270 NON-COVERED ITEM OR SERVICE: HCPCS | Performed by: INTERNAL MEDICINE

## 2019-02-14 PROCEDURE — 700111 HCHG RX REV CODE 636 W/ 250 OVERRIDE (IP): Performed by: ANESTHESIOLOGY

## 2019-02-14 PROCEDURE — 501838 HCHG SUTURE GENERAL: Performed by: ORTHOPAEDIC SURGERY

## 2019-02-14 PROCEDURE — 160027 HCHG SURGERY MINUTES - 1ST 30 MINS LEVEL 2: Performed by: ORTHOPAEDIC SURGERY

## 2019-02-14 PROCEDURE — 160048 HCHG OR STATISTICAL LEVEL 1-5: Performed by: ORTHOPAEDIC SURGERY

## 2019-02-14 PROCEDURE — 502240 HCHG MISC OR SUPPLY RC 0272: Performed by: ORTHOPAEDIC SURGERY

## 2019-02-14 PROCEDURE — 80053 COMPREHEN METABOLIC PANEL: CPT

## 2019-02-14 PROCEDURE — 87075 CULTR BACTERIA EXCEPT BLOOD: CPT

## 2019-02-14 PROCEDURE — 36415 COLL VENOUS BLD VENIPUNCTURE: CPT

## 2019-02-14 PROCEDURE — 700111 HCHG RX REV CODE 636 W/ 250 OVERRIDE (IP)

## 2019-02-14 PROCEDURE — 80061 LIPID PANEL: CPT

## 2019-02-14 PROCEDURE — A9270 NON-COVERED ITEM OR SERVICE: HCPCS | Performed by: HOSPITALIST

## 2019-02-14 PROCEDURE — 700102 HCHG RX REV CODE 250 W/ 637 OVERRIDE(OP): Performed by: ANESTHESIOLOGY

## 2019-02-14 PROCEDURE — 85025 COMPLETE CBC W/AUTO DIFF WBC: CPT

## 2019-02-14 PROCEDURE — 700102 HCHG RX REV CODE 250 W/ 637 OVERRIDE(OP): Performed by: INTERNAL MEDICINE

## 2019-02-14 PROCEDURE — 500881 HCHG PACK, EXTREMITY: Performed by: ORTHOPAEDIC SURGERY

## 2019-02-14 PROCEDURE — 501330 HCHG SET, CYSTO IRRIG TUBING: Performed by: ORTHOPAEDIC SURGERY

## 2019-02-14 PROCEDURE — 160002 HCHG RECOVERY MINUTES (STAT): Performed by: ORTHOPAEDIC SURGERY

## 2019-02-14 PROCEDURE — 99232 SBSQ HOSP IP/OBS MODERATE 35: CPT | Performed by: INTERNAL MEDICINE

## 2019-02-14 PROCEDURE — 0Y973ZZ DRAINAGE OF RIGHT FEMORAL REGION, PERCUTANEOUS APPROACH: ICD-10-PCS | Performed by: ORTHOPAEDIC SURGERY

## 2019-02-14 PROCEDURE — 700102 HCHG RX REV CODE 250 W/ 637 OVERRIDE(OP): Performed by: HOSPITALIST

## 2019-02-14 PROCEDURE — 87205 SMEAR GRAM STAIN: CPT

## 2019-02-14 PROCEDURE — 700105 HCHG RX REV CODE 258: Performed by: HOSPITALIST

## 2019-02-14 PROCEDURE — 160036 HCHG PACU - EA ADDL 30 MINS PHASE I: Performed by: ORTHOPAEDIC SURGERY

## 2019-02-14 PROCEDURE — 87070 CULTURE OTHR SPECIMN AEROBIC: CPT

## 2019-02-14 PROCEDURE — A9270 NON-COVERED ITEM OR SERVICE: HCPCS | Performed by: ANESTHESIOLOGY

## 2019-02-14 PROCEDURE — 87077 CULTURE AEROBIC IDENTIFY: CPT

## 2019-02-14 PROCEDURE — 160009 HCHG ANES TIME/MIN: Performed by: ORTHOPAEDIC SURGERY

## 2019-02-14 RX ORDER — OXYCODONE HCL 5 MG/5 ML
10 SOLUTION, ORAL ORAL
Status: DISCONTINUED | OUTPATIENT
Start: 2019-02-14 | End: 2019-02-14 | Stop reason: HOSPADM

## 2019-02-14 RX ORDER — HYDROMORPHONE HYDROCHLORIDE 1 MG/ML
0.1 INJECTION, SOLUTION INTRAMUSCULAR; INTRAVENOUS; SUBCUTANEOUS
Status: DISCONTINUED | OUTPATIENT
Start: 2019-02-14 | End: 2019-02-14

## 2019-02-14 RX ORDER — AMOXICILLIN 250 MG/1
250 CAPSULE ORAL EVERY 8 HOURS
Status: DISCONTINUED | OUTPATIENT
Start: 2019-02-14 | End: 2019-02-16 | Stop reason: HOSPADM

## 2019-02-14 RX ORDER — OXYCODONE HCL 5 MG/5 ML
5 SOLUTION, ORAL ORAL
Status: DISCONTINUED | OUTPATIENT
Start: 2019-02-14 | End: 2019-02-14 | Stop reason: HOSPADM

## 2019-02-14 RX ORDER — HYDROMORPHONE HYDROCHLORIDE 1 MG/ML
0.4 INJECTION, SOLUTION INTRAMUSCULAR; INTRAVENOUS; SUBCUTANEOUS
Status: DISCONTINUED | OUTPATIENT
Start: 2019-02-14 | End: 2019-02-14

## 2019-02-14 RX ORDER — DOXYCYCLINE 100 MG/1
100 TABLET ORAL EVERY 12 HOURS
Status: DISCONTINUED | OUTPATIENT
Start: 2019-02-14 | End: 2019-02-14

## 2019-02-14 RX ORDER — SODIUM CHLORIDE, SODIUM LACTATE, POTASSIUM CHLORIDE, CALCIUM CHLORIDE 600; 310; 30; 20 MG/100ML; MG/100ML; MG/100ML; MG/100ML
INJECTION, SOLUTION INTRAVENOUS CONTINUOUS
Status: DISCONTINUED | OUTPATIENT
Start: 2019-02-14 | End: 2019-02-14 | Stop reason: HOSPADM

## 2019-02-14 RX ORDER — FAMOTIDINE 20 MG/1
20 TABLET, FILM COATED ORAL DAILY
Status: DISCONTINUED | OUTPATIENT
Start: 2019-02-14 | End: 2019-02-16 | Stop reason: HOSPADM

## 2019-02-14 RX ORDER — HYDROMORPHONE HYDROCHLORIDE 1 MG/ML
0.2 INJECTION, SOLUTION INTRAMUSCULAR; INTRAVENOUS; SUBCUTANEOUS
Status: DISCONTINUED | OUTPATIENT
Start: 2019-02-14 | End: 2019-02-14 | Stop reason: HOSPADM

## 2019-02-14 RX ORDER — HYDROMORPHONE HYDROCHLORIDE 1 MG/ML
0.2 INJECTION, SOLUTION INTRAMUSCULAR; INTRAVENOUS; SUBCUTANEOUS
Status: DISCONTINUED | OUTPATIENT
Start: 2019-02-14 | End: 2019-02-14

## 2019-02-14 RX ORDER — ONDANSETRON 2 MG/ML
4 INJECTION INTRAMUSCULAR; INTRAVENOUS
Status: DISCONTINUED | OUTPATIENT
Start: 2019-02-14 | End: 2019-02-14 | Stop reason: HOSPADM

## 2019-02-14 RX ORDER — OXYCODONE HYDROCHLORIDE 10 MG/1
10 TABLET ORAL 2 TIMES DAILY PRN
COMMUNITY
Start: 2019-01-30 | End: 2019-10-22

## 2019-02-14 RX ORDER — HYDROMORPHONE HYDROCHLORIDE 1 MG/ML
0.1 INJECTION, SOLUTION INTRAMUSCULAR; INTRAVENOUS; SUBCUTANEOUS
Status: DISCONTINUED | OUTPATIENT
Start: 2019-02-14 | End: 2019-02-14 | Stop reason: HOSPADM

## 2019-02-14 RX ORDER — HYDROMORPHONE HYDROCHLORIDE 1 MG/ML
0.4 INJECTION, SOLUTION INTRAMUSCULAR; INTRAVENOUS; SUBCUTANEOUS
Status: DISCONTINUED | OUTPATIENT
Start: 2019-02-14 | End: 2019-02-14 | Stop reason: HOSPADM

## 2019-02-14 RX ORDER — MIDAZOLAM HYDROCHLORIDE 1 MG/ML
1 INJECTION INTRAMUSCULAR; INTRAVENOUS
Status: DISCONTINUED | OUTPATIENT
Start: 2019-02-14 | End: 2019-02-14 | Stop reason: HOSPADM

## 2019-02-14 RX ADMIN — OXYCODONE HYDROCHLORIDE 5 MG: 5 TABLET ORAL at 01:36

## 2019-02-14 RX ADMIN — LISINOPRIL 5 MG: 2.5 TABLET ORAL at 05:35

## 2019-02-14 RX ADMIN — LEVOTHYROXINE SODIUM 50 MCG: 25 TABLET ORAL at 05:39

## 2019-02-14 RX ADMIN — HYDROCODONE BITARTRATE AND ACETAMINOPHEN 30 ML: 2.5; 108 SOLUTION ORAL at 16:16

## 2019-02-14 RX ADMIN — SENNOSIDES AND DOCUSATE SODIUM 2 TABLET: 8.6; 5 TABLET ORAL at 22:05

## 2019-02-14 RX ADMIN — SERTRALINE 50 MG: 50 TABLET, FILM COATED ORAL at 05:37

## 2019-02-14 RX ADMIN — OXYCODONE HYDROCHLORIDE 10 MG: 10 TABLET ORAL at 18:52

## 2019-02-14 RX ADMIN — OXYCODONE HYDROCHLORIDE 5 MG: 5 TABLET ORAL at 10:52

## 2019-02-14 RX ADMIN — SENNOSIDES AND DOCUSATE SODIUM 2 TABLET: 8.6; 5 TABLET ORAL at 05:35

## 2019-02-14 RX ADMIN — SODIUM CHLORIDE: 9 INJECTION, SOLUTION INTRAVENOUS at 01:22

## 2019-02-14 RX ADMIN — MIDAZOLAM 1 MG: 1 INJECTION INTRAMUSCULAR; INTRAVENOUS at 14:22

## 2019-02-14 RX ADMIN — SODIUM CHLORIDE: 9 INJECTION, SOLUTION INTRAVENOUS at 18:49

## 2019-02-14 RX ADMIN — ATENOLOL 50 MG: 50 TABLET ORAL at 05:35

## 2019-02-14 RX ADMIN — OXYCODONE HYDROCHLORIDE 5 MG: 5 TABLET ORAL at 05:37

## 2019-02-14 RX ADMIN — AMOXICILLIN 250 MG: 250 CAPSULE ORAL at 22:05

## 2019-02-14 RX ADMIN — ESTRADIOL 2 MG: 2 TABLET ORAL at 05:38

## 2019-02-14 RX ADMIN — HYDROMORPHONE HYDROCHLORIDE 0.2 MG: 1 INJECTION, SOLUTION INTRAMUSCULAR; INTRAVENOUS; SUBCUTANEOUS at 16:23

## 2019-02-14 RX ADMIN — HYDROMORPHONE HYDROCHLORIDE 0.4 MG: 1 INJECTION, SOLUTION INTRAMUSCULAR; INTRAVENOUS; SUBCUTANEOUS at 15:50

## 2019-02-14 RX ADMIN — FAMOTIDINE 20 MG: 20 TABLET ORAL at 05:36

## 2019-02-14 ASSESSMENT — COGNITIVE AND FUNCTIONAL STATUS - GENERAL
DAILY ACTIVITIY SCORE: 20
CLIMB 3 TO 5 STEPS WITH RAILING: A LITTLE
WALKING IN HOSPITAL ROOM: A LITTLE
DRESSING REGULAR LOWER BODY CLOTHING: A LITTLE
MOVING TO AND FROM BED TO CHAIR: A LITTLE
HELP NEEDED FOR BATHING: A LITTLE
SUGGESTED CMS G CODE MODIFIER DAILY ACTIVITY: CJ
SUGGESTED CMS G CODE MODIFIER MOBILITY: CK
STANDING UP FROM CHAIR USING ARMS: A LITTLE
TOILETING: A LITTLE
MOBILITY SCORE: 18
TURNING FROM BACK TO SIDE WHILE IN FLAT BAD: A LITTLE
DRESSING REGULAR UPPER BODY CLOTHING: A LITTLE
MOVING FROM LYING ON BACK TO SITTING ON SIDE OF FLAT BED: A LITTLE

## 2019-02-14 ASSESSMENT — ENCOUNTER SYMPTOMS
ABDOMINAL PAIN: 0
HEADACHES: 0
NAUSEA: 0
SHORTNESS OF BREATH: 0
FEVER: 0
COUGH: 0

## 2019-02-14 ASSESSMENT — LIFESTYLE VARIABLES
ALCOHOL_USE: NO
EVER_SMOKED: NEVER

## 2019-02-14 NOTE — OP REPORT
DATE OF SERVICE:  02/14/2019    PREOPERATIVE DIAGNOSIS:  Status post right total hip arthroplasty with   proximal pole wound dehiscence.    POSTOPERATIVE DIAGNOSIS:  Status post right total hip arthroplasty with   proximal pole wound dehiscence.    PROCEDURE PERFORMED:  Secondary closure of surgical wound dehiscence.    SURGEON:  Vladimir Lee MD    ASSISTANT:  Trey Curiel DO    ANESTHESIOLOGIST:  Tobey B. Gansert, MD    ANESTHESIA TYPE:  General.    SPECIMENS:  Cultures.    ESTIMATED BLOOD LOSS:  Minimal.    COMPLICATIONS:  None.    OPERATIVE INDICATIONS:  The patient is a pleasant 78-year-old female who   underwent total hip arthroplasty by Dr. Mercedes about a month ago.  She recently   noticed the opening of the proximal aspect of her wound with some purulent   appearing drainage, although scant.  She did have some thigh pain since her   surgery, which she had difficulty explaining.  CT scan was obtained of the hip   demonstrating a small fluid collection, mostly distal to the end of her   wound.  The proximal aspect of her wound does not appear to be communicating   in anyway with her hematoma.  She has a normal neurovascular otherwise and the   skin envelope.  Given these findings, she is an appropriately indicated   candidate for a localized wound I and D and secondary closure of her wound   dehiscence.  I discussed the risks, benefits, and alternatives with her   including the risk of infection, wound healing complication, neurovascular   injury, blood loss and need additional surgeries.  I discussed the benefits   including improved chance of wound healing and alternatives including   nonoperative management, which I did not recommend.  Her informed consent was   signed and documented.  She understands that if this extends deep to the   fascia that she will require additional surgery.  I met with her   preoperatively and marked the operative extremity and we proceeded to the   operating room at  [facility].    OPERATIVE COURSE:  She underwent general anesthesia with Dr. Gansert, was   positioned supine.  All bony prominences were well padded.  The right hip and   lower extremity were prepped and draped in sterile orthopedic fashion using   ChloraPrep and the surgical team scrubbed in.  A procedural pause was   conducted to verify correct patient, correct extremity, presence of the   surgeon's initials on the operative extremity, and administration of IV   antibiotics, in this case Ancef.  Following generalized agreement, the wound   edges at the proximal pole of the incision were excised sharply.  Some   unhealthy appearing subcutaneous tissue was resected sharply with a knife.    Cultures were sent from deep within the wound.  We did probe the wound   thoroughly both proximally and distally within the open portion of the wound.    There was no tracking deep to the subcutaneous tissue and we were able to   determine as well that the proximal aspect of the incision did not communicate   with the hematoma.  As such, we thoroughly irrigated the proximal wound and   closed with 3-0 nylon sutures.  A Prevena VAC was applied.  The patient was   transferred to the recovery room in stable condition, sustaining no   complications.    POSTOPERATIVE PLAN:  1.  Weightbearing as tolerated.  2.  Deep venous thrombosis prophylaxis with aspirin and SCDs in the hospital.  3.  IV antibiotics -- none, would recommend a 7-10 day course of an oral   antibiotic to cover the proximal aspect of her wound.  4.  Discharge planning.       ____________________________________     MD REJI Saez / NTS    DD:  02/14/2019 13:47:04  DT:  02/14/2019 14:54:27    D#:  [id number]  Job#:  [id number]

## 2019-02-14 NOTE — ASSESSMENT & PLAN NOTE
With Ct evidence of 12 cm lobulated fluid collection, abscess vs seroma   Hold on abx not currently septic  Ortho has been consulted, surgery today and cultures collected  Per ortho, recommends oral abx. Will start on amoxicillin, f/u culture result  PTOT

## 2019-02-14 NOTE — ED PROVIDER NOTES
ED Provider Note    Scribed for Keith Haywood M.D. by Nadine Theodore. 2/13/2019  7:44 PM    Means of arrival: Wheel-chair  History obtained from: Patient  History limited by: None    CHIEF COMPLAINT  Chief Complaint   Patient presents with   • Post-Op Complications       HPI    Donte Magaña is a 78 y.o. female presenting with hip pain secondary to a recent right hip replacement on 1/17/19 by Dr. Mercedes. She reports Dr. Mercedes prescribed oxycodone to alleviate her pain, however only prescribed 4.-5 days worth of pills. Patient is currently anticoagulated with Warfarin. She also notes she had a recent X-ray with reassuring results and an ultrasound done that revealed no possible blood clot.  Patient describes hip pain as constant and dull with 6/10 in severity. Pain is exacerbated with ambulation. She reports pain radiates to her right knee and ankle No alleviating factors were reported. Patient denies fever, nausea, chest pain, difficulty breathing, vomitting, or diarrhea.    REVIEW OF SYSTEMS  See HPI for further details.   Pertinent positives include: right hip pain, right knee pain, right ankle pain  Pertinent negative include: fever, nausea, chest pain, difficulty breathing, vomitting, or diarrhea.  10 + review of systems otherwise negative     PAST MEDICAL HISTORY   has a past medical history of A-fib (HCC); Anesthesia; Anticoagulant long-term use (1/12/2012); Arthritis; Asthma; Atrial fibrillation (HCC); Backpain; Bowel habit changes; Breath shortness; Bronchitis (Nov, 2013); CAD (coronary artery disease); Depression; Glaucoma (5/3/2011); Hematoma complicating a procedure (11/3/2012); Hemorrhagic disorder ([facility]); High cholesterol; Hypertension; Hypothyroid; Lupus; Macular degeneration; Menopause (1/12/2012); Mitral regurgitation (10/30/2012); Obesity (1/12/2012); Pacemaker (2018); Pneumonia (feb,2013); Pre-syncope (6/29/2018); Pulmonary hypertension (HCC) (10/30/2012); PVC's (premature ventricular  "contractions) (1/12/2012); Senile nuclear sclerosis; Spinal stenosis of lumbar region at multiple levels; Unspecified cataract; and Unspecified urinary incontinence.    SOCIAL HISTORY  Social History     Social History Main Topics   • Smoking status: Never Smoker   • Smokeless tobacco: Never Used   • Alcohol use No   • Drug use: No   • Sexual activity: Not Currently     Partners: Male     Birth control/ protection: Post-Menopausal       SURGICAL HISTORY   has a past surgical history that includes tonsillectomy and adenoidectomy; recovery (11/30/2010); colonoscopy (2008); abdominal hysterectomy total (April 15,1975); other; cataract phaco with iol (4/21/2015); cataract phaco with iol (Right, 5/5/2015); pacemaker insertion (06/30/2018); open reduction; knee arthroplasty total (Left, 5/28/2015); knee arthroplasty total (Right, 6/23/2016); other cardiac surgery (12/2017 and 07/19/2018 ); and hip arth anterior total (Right, 1/17/2019).    CURRENT MEDICATIONS  Home Medications    **Home medications have not yet been reviewed for this encounter**         ALLERGIES  Allergies   Allergen Reactions   • Amiodarone Hives     Throat and tongue itching   • Bactrim Shortness of Breath   • Claritin-D [Loratadine-Pseudoephedrine] Palpitations   • Metoprolol      Causes throat swelling   • Morphine      Hallucinations   • Qvar [Beclomethasone Dipropionate]      Pressure on heart     • Vibramycin Shortness of Breath   • Atorvastatin Calcium-Polysorbate 80      Muscle aches     • Augmentin      Unknown reaction   • Cipro Xr Swelling   • Diltiazem      rash   • Flecainide      dizziness   • Keflex Unspecified     Pt states \"Unsure\".   • Mucinex      GI Distress     • Tramadol      crying   • Phytoplex Z-Guard [Petrolatum-Zinc Oxide]      \"causes burning\"   • Atorvastatin Myalgia   • Tape Rash     Paper tape okay       PHYSICAL EXAM  VITAL SIGNS: /53   Pulse 89   Temp 36.2 °C (97.2 °F) (Temporal)   Resp 14   Wt 83.9 kg (185 lb) "   LMP 01/01/1993   SpO2 96%   BMI 31.76 kg/m²    SpO2: I interpret this pulse oximetry as normal  Constitutional: Well developed, Well nourished, no acute distress, Non-toxic appearance.   HENT: Normocephalic, Atraumatic, Bilateral external ears normal, Oropharynx moist, No oral exudates.   Eyes: PERRLA, EOMI, Conjunctiva normal, No discharge.   Neck: No tenderness, Supple, No stridor.   Lymphatic: No lymphadenopathy noted.   Cardiovascular: Normal heart rate, Normal rhythm.   Thorax & Lungs: Clear to auscultation bilaterally, No respiratory distress, No wheezing, No crackles.   Abdomen: Soft, No tenderness, No masses, No pulsatile masses.   Skin: Large area of redness and warmth on surgical wound. 1 cm indehiscence with purulent drainage no focal areolar fluctuans. Warm, Dry, No rash.   Extremities:, Slight pedal edema No cyanosis.   Musculoskeletal: Pain with range of motion on right hip, No tenderness to palpation or major deformities noted. Intact distal pulses  Neurologic: CN 2-12 intact. Awake, alert. Moves all extremities spontaneously.  Psychiatric: Affect normal, Judgment normal, Mood normal.       DIAGNOSTIC STUDIES / PROCEDURES    LABORATORY  Results for orders placed or performed during the hospital encounter of 02/13/19   CBC WITH DIFFERENTIAL   Result Value Ref Range    WBC 11.1 (H) 4.8 - 10.8 K/uL    RBC 4.25 4.20 - 5.40 M/uL    Hemoglobin 13.8 12.0 - 16.0 g/dL    Hematocrit 42.4 37.0 - 47.0 %    MCV 99.8 (H) 81.4 - 97.8 fL    MCH 32.5 27.0 - 33.0 pg    MCHC 32.5 (L) 33.6 - 35.0 g/dL    RDW 49.8 35.9 - 50.0 fL    Platelet Count 370 164 - 446 K/uL    MPV 9.2 9.0 - 12.9 fL    Neutrophils-Polys 62.70 44.00 - 72.00 %    Lymphocytes 25.90 22.00 - 41.00 %    Monocytes 7.90 0.00 - 13.40 %    Eosinophils 2.60 0.00 - 6.90 %    Basophils 0.50 0.00 - 1.80 %    Immature Granulocytes 0.40 0.00 - 0.90 %    Nucleated RBC 0.00 /100 WBC    Neutrophils (Absolute) 6.98 2.00 - 7.15 K/uL    Lymphs (Absolute) 2.88 1.00  - 4.80 K/uL    Monos (Absolute) 0.88 (H) 0.00 - 0.85 K/uL    Eos (Absolute) 0.29 0.00 - 0.51 K/uL    Baso (Absolute) 0.06 0.00 - 0.12 K/uL    Immature Granulocytes (abs) 0.05 0.00 - 0.11 K/uL    NRBC (Absolute) 0.00 K/uL   BASIC METABOLIC PANEL   Result Value Ref Range    Sodium 136 135 - 145 mmol/L    Potassium 4.4 3.6 - 5.5 mmol/L    Chloride 99 96 - 112 mmol/L    Co2 27 20 - 33 mmol/L    Glucose 95 65 - 99 mg/dL    Bun 20 8 - 22 mg/dL    Creatinine 1.03 0.50 - 1.40 mg/dL    Calcium 9.3 8.5 - 10.5 mg/dL    Anion Gap 10.0 0.0 - 11.9   LACTIC ACID   Result Value Ref Range    Lactic Acid 1.5 0.5 - 2.0 mmol/L   CRP QUANTITIVE (NON-CARDIAC)   Result Value Ref Range    Stat C-Reactive Protein 2.60 (H) 0.00 - 0.75 mg/dL   ESTIMATED GFR   Result Value Ref Range    GFR If African American >60 >60 mL/min/1.73 m 2    GFR If Non African American 52 (A) >60 mL/min/1.73 m 2   TSH (Thyroid Stimulating Hormone)   Result Value Ref Range    TSH 1.330 0.380 - 5.330 uIU/mL       RADIOLOGY  CT-HIP WITH PLUS RECONS RIGHT   Final Result         1. A 12 cm lobulated subcutaneous fluid collection anterolateral to the femoral component of the hip arthroplasty. It may represent postsurgical seroma or a developing abscess. Infection should be excluded clinically. No soft tissue gas.      2. Right hip arthroplasty is well seated. No new fracture or evidence of hardware complication.      3. Incidental bilateral ovarian cysts. They can be followed with a pelvic ultrasound.              CHART REVIEW  Pertinent medical chart information was reviewed and reveals: No recent pertinent encounters, right hip arthroplasty 1 month ago with Dr. Mercedes    COURSE & MEDICAL DECISION MAKING  Pertinent Labs & Imaging studies reviewed. (See chart for details)    Differential diagnoses include but not limited to: Septic joint, cellulitis, abscess, wound dehiscence, wound infection, DVT    7:44 PM - Patient seen and examined at bedside. Discussed plan of care,  including labs, imaging, pain medications. Patient agrees to the plan of care. Ordered for CBC with differential, BMP, Lactic Acid, CRP Quantitive, CT-HIP to evaluate her symptoms.     10:43 PM Spoke with Dr. Ambrosio (Ortho) who advised holding antibiotics as patient is currently not septic, plan to obtain intraoperative wound cultures.      Vitals:    02/13/19 1629 02/13/19 1634 02/13/19 2041 02/13/19 2152   BP: 122/53  132/45 122/42   Pulse: 89  80 81   Resp: 14  15 15   Temp: 36.2 °C (97.2 °F)      TempSrc: Temporal      SpO2: 96%  97%    Weight:  83.9 kg (185 lb)         Medications   senna-docusate (PERICOLACE or SENOKOT S) 8.6-50 MG per tablet 2 Tab (not administered)     And   polyethylene glycol/lytes (MIRALAX) PACKET 1 Packet (not administered)     And   magnesium hydroxide (MILK OF MAGNESIA) suspension 30 mL (not administered)     And   bisacodyl (DULCOLAX) suppository 10 mg (not administered)   NS infusion (not administered)   ondansetron (ZOFRAN) syringe/vial injection 4 mg (not administered)   ondansetron (ZOFRAN ODT) dispertab 4 mg (not administered)   Pharmacy Consult Request ...Pain Management Review 1 Each (not administered)     And   oxyCODONE immediate-release (ROXICODONE) tablet 5 mg (not administered)     And   oxyCODONE immediate-release (ROXICODONE) tablet 10 mg (not administered)     And   HYDROmorphone pf (DILAUDID) injection 0.5 mg (not administered)   atenolol (TENORMIN) tablet 50 mg (not administered)   estradiol (ESTRACE) tablet 2 mg (not administered)   furosemide (LASIX) tablet 80 mg (not administered)   levothyroxine (SYNTHROID) tablet 50 mcg (not administered)   lisinopril (PRINIVIL) 10 MG tablet 5 mg (not administered)   Mirabegron ER TB24 1 Tab (not administered)   raNITidine (ZANTAC) tablet 150 mg (not administered)   sertraline (ZOLOFT) tablet 50 mg (not administered)   spironolactone (ALDACTONE) tablet 50 mg (not administered)   ondansetron (ZOFRAN) syringe/vial injection 4  mg (4 mg Intravenous Given 2/13/19 2015)   HYDROmorphone pf (DILAUDID) injection 0.5 mg (0.5 mg Intravenous Given 2/13/19 2015)   iohexol (OMNIPAQUE) 350 mg/mL (80 mL Intravenous Given 2/13/19 2144)   HYDROmorphone pf (DILAUDID) injection 0.5 mg (0.5 mg Intravenous Given 2/13/19 2150)       78-year-old female with history of recent right hip arthroplasty presenting for worsening right hip pain and swelling.  Patient slightly tachycardic on arrival, otherwise vital signs reassuring.  Exam with signs of cellulitis, slight wound dehiscence, concerning for deep space infection or abscess.  Labs are relatively unremarkable.  Currently without any evidence of sepsis.  CT concerning for seroma versus abscess, with overlying likely infection, consulted orthopedics who recommended withholding antibiotics and admit to medicine.  Will obtain wound cultures intraoperatively.  Consulted medicine for admission.  Patient hemodynamically stable and comfortable at time of admission.    DISPOSITION  DISPOSITION:  Patient will be admitted to  in guarded condition.    FINAL IMPRESSION  Visit Diagnoses     ICD-10-CM   1. Wound dehiscence T81.30XA   2. Cellulitis, unspecified cellulitis site L03.90   3. Abscess L02.91        Nadine HERNANDEZ (Scribe), am scribing for, and in the presence of, Keith Haywood M.D..    Electronically signed by: Nadine Theodore (Mandeep), 2/13/2019    Keith HERNANDEZ M.D. personally performed the services described in this documentation, as scribed by Nadine Theodore in my presence, and it is both accurate and complete. C    The note accurately reflects work and decisions made by me.  Keith Haywood  2/14/2019  1:24 AM

## 2019-02-14 NOTE — OR NURSING
Admitted to PACU crying with pain in right hip; position changed until patent lying on left side.  Medicated with pain med and versed for constant pain; patient fell asleep after repositioned.  Provenia wound vac in place

## 2019-02-14 NOTE — PROGRESS NOTES
Seen and examined.  Right hip wound after SOHAN.    Blood pressure 102/46, pulse 80, temperature 36.5 °C (97.7 °F), temperature source Temporal, resp. rate 16, weight 83.9 kg (185 lb), last menstrual period 01/01/1993, SpO2 100 %, not currently breastfeeding.      Exam:  RLE:  Proximal pole of wound with ~1 cm area of dehiscence and fibrinous drainage.  Firm thigh swelling distal to incision, mild warmth    #1 Right anterior SOHAN with proximal pole incision dehiscence    Plan:  - To OR today for I&D proximal hip wound.  I do not plan on decompression of apparent subacute hematoma.    - NPO  - If more extensive surgery required, will require reversal of INR and additional surgery in the coming days

## 2019-02-14 NOTE — ED NOTES
Report received from Kenia DELGADO. Pt updated on room assignment.   Pt assisted off commode and back to bed. Repeat vitals obtained.

## 2019-02-14 NOTE — ED NOTES
Pt to room via wheel chair, agree with triage notes. Pt had hip surgery Jan. 17, states the pain is unbearable, 10/10.

## 2019-02-14 NOTE — ASSESSMENT & PLAN NOTE
Hx of follows with Dr. Haywood cardiology  Moderate disease on angiogram 2013  Not on asa while on coumadin  Says statin gave her muscle aches

## 2019-02-14 NOTE — ED TRIAGE NOTES
79 y/o female bib wheelchair for evaluation of right hip pain and non-healing wound. Pt states she had a right hip arthroplasty on 1/17/19 with Dr. Mercedes. She also c/o stiffness in the leg that does not seem to be resolving.

## 2019-02-14 NOTE — ED NOTES
Pt has small opening at proximal end of incision line, yellow discharge noted. Pt incision feels warm and appears red and swollen.

## 2019-02-14 NOTE — PROGRESS NOTES
Received report from ER DANNY Hayes.  Pt arrived in unit escorted by transport.  Pt steady with one person assist.

## 2019-02-14 NOTE — DISCHARGE PLANNING
Care Transition Team Assessment  Pt states her  will be able to pick her up upon discharge.     Information Source  Orientation : Oriented x 4  Information Given By: Patient  Informant's Name:  (Donte Magaña)  Who is responsible for making decisions for patient? : Patient    Readmission Evaluation  Is this a readmission?: Yes - unplanned readmission  Why do you think you were readmitted?:  (Infection)    Elopement Risk  Legal Hold: No  Ambulatory or Self Mobile in Wheelchair: No-Not an Elopement Risk  Elopement Risk: Not at Risk for Elopement    Interdisciplinary Discharge Planning  Does Admitting Nurse Feel This Could be a Complex Discharge?: No  Primary Care Physician:  (Kishore Price )  Lives with - Patient's Self Care Capacity: Spouse  Support Systems: Family Member(s), Spouse / Significant Other  Housing / Facility: [address]  Do You Take your Prescribed Medications Regularly: Yes (Pharmacy: [facility] on [address]  Able to Return to Previous ADL's: Future Time w/Therapy  Mobility Issues: Yes (Uses walker)  Patient Expects to be Discharged to::  (Home)  Assistance Needed: Unknown at this Time  Durable Medical Equipment: Home Oxygen    Discharge Preparedness  What is your plan after discharge?: Home with help  What are your discharge supports?: Child, Spouse  Prior Functional Level: Uses Walker  Difficulity with ADLs: None  Difficulity with IADLs: None    Functional Assesment  Prior Functional Level: Uses Walker    Finances  Financial Barriers to Discharge: No  Prescription Coverage: Yes    Vision / Hearing Impairment  Vision Impairment : No  Hearing Impairment : No         Advance Directive  Advance Directive?: None  Advance Directive offered?: AD Booklet refused    Domestic Abuse  Have you ever been the victim of abuse or violence?: No    Psychological Assessment  History of Substance Abuse: None  History of Psychiatric Problems: No  Newly Diagnosed Illness: No    Discharge Risks or  Barriers  Discharge risks or barriers?: No    Anticipated Discharge Information  Anticipated discharge disposition: Home  Discharge Address:  ([address] [place] [address] Cecilia Mercado)  Discharge Contact Phone Number:  (Woody Kearns  (spouse)  [phone])

## 2019-02-14 NOTE — ED NOTES
Pt oxygen decreased to 84% while sleeping, place pt on 2 L oxygen via nasal canula with return of O2 sat to 95%

## 2019-02-14 NOTE — RESPIRATORY CARE
COPD EDUCATION by COPD CLINICAL EDUCATOR  2/14/2019 at 7:54 AM by Michelle Bolaños     Patient reviewed by COPD education team. Patient does not qualify for COPD program.

## 2019-02-14 NOTE — H&P
Hospital Medicine History & Physical Note    Date of Service  2/13/2019    Primary Care Physician  Kishore Price M.D.    Consultants  none    Code Status  full    Chief Complaint  Right hip pain     History of Presenting Illness  78 y.o. female who presented 2/13/2019 with past medical history of atrial fibrillation on Coumadin hypertension hypothyroidism who presents with right hip pain.  Patient recently underwent hip replacement January 17, 2019 by Dr. Mercedes.  After hip replacement she was doing well however over the past 4-5 days she has been unable to alleviate her pain with prescribed oxycodone.  She is currently anticoagulated with warfarin secondary to atrial fibrillation.  She had a recent x-ray and ultrasound done that were both unremarkable.  She is at hip pain that is dull 6 out of 10 in severity worse with ambulation nonradiating throbbing type of pain.  Denies any fevers chills sweats.  No known alleviating or exacerbating factors otherwise.  This patient is not septic however she does have a 12 cm lobulated subcutaneous fluid collection anterior lateral to the femoral component of the hip arthroplasty may be an abscess orthopedic surgery has been consulted will be admitted to the hospital for further management.    Review of Systems  Review of Systems   Constitutional: Negative for chills and fever.   HENT: Negative for congestion, hearing loss and tinnitus.    Eyes: Negative for blurred vision, double vision and discharge.   Respiratory: Negative for cough, hemoptysis and shortness of breath.    Cardiovascular: Negative for chest pain, palpitations and leg swelling.   Gastrointestinal: Negative for abdominal pain, heartburn, nausea and vomiting.   Genitourinary: Negative for dysuria and flank pain.   Musculoskeletal: Positive for back pain and joint pain. Negative for myalgias.   Skin: Negative for rash.   Neurological: Negative for dizziness, sensory change, speech change, focal weakness  and weakness.   Endo/Heme/Allergies: Negative for environmental allergies. Does not bruise/bleed easily.   Psychiatric/Behavioral: Negative for depression, hallucinations and substance abuse.       Past Medical History   has a past medical history of A-fib (HCC); Anesthesia; Anticoagulant long-term use (1/12/2012); Arthritis; Asthma; Atrial fibrillation (HCC); Backpain; Bowel habit changes; Breath shortness; Bronchitis (Nov, 2013); CAD (coronary artery disease); Depression; Glaucoma (5/3/2011); Hematoma complicating a procedure (11/3/2012); Hemorrhagic disorder ([facility]); High cholesterol; Hypertension; Hypothyroid; Lupus; Macular degeneration; Menopause (1/12/2012); Mitral regurgitation (10/30/2012); Obesity (1/12/2012); Pacemaker (2018); Pneumonia (feb,2013); Pre-syncope (6/29/2018); Pulmonary hypertension ([facility]) (10/30/2012); PVC's (premature ventricular contractions) (1/12/2012); Senile nuclear sclerosis; Spinal stenosis of lumbar region at multiple levels; Unspecified cataract; and Unspecified urinary incontinence.    Surgical History   has a past surgical history that includes tonsillectomy and adenoidectomy; recovery (11/30/2010); colonoscopy (2008); abdominal hysterectomy total (April 15,1975); other; cataract phaco with iol (4/21/2015); cataract phaco with iol (Right, 5/5/2015); pacemaker insertion (06/30/2018); open reduction; knee arthroplasty total (Left, 5/28/2015); knee arthroplasty total (Right, 6/23/2016); other cardiac surgery (12/2017 and 07/19/2018 ); and hip arth anterior total (Right, 1/17/2019).     Family History  family history includes Cancer in her paternal aunt; Diabetes in her father; GI in her daughter; Heart Disease in her brother and daughter; Other in her daughter; Stroke in her mother, sister, and sister.     Social History   reports that she has never smoked. She has never used smokeless tobacco. She reports that she does not drink alcohol or use drugs.    Allergies  Allergies   Allergen  "Reactions   • Amiodarone Hives     Throat and tongue itching   • Bactrim Shortness of Breath   • Claritin-D [Loratadine-Pseudoephedrine] Palpitations   • Metoprolol      Causes throat swelling   • Morphine      Hallucinations   • Qvar [Beclomethasone Dipropionate]      Pressure on heart     • Vibramycin Shortness of Breath   • Atorvastatin Calcium-Polysorbate 80      Muscle aches     • Augmentin      Unknown reaction   • Cipro Xr Swelling   • Diltiazem      rash   • Flecainide      dizziness   • Keflex Unspecified     Pt states \"Unsure\".   • Mucinex      GI Distress     • Tramadol      crying   • Phytoplex Z-Guard [Petrolatum-Zinc Oxide]      \"causes burning\"   • Atorvastatin Myalgia   • Tape Rash     Paper tape okay       Medications  Prior to Admission Medications   Prescriptions Last Dose Informant Patient Reported? Taking?   Lutein 20 MG Cap   No No   Sig: Take 1 Cap by mouth every day.   Mirabegron ER 50 MG TABLET SR 24 HR   No No   Sig: Take 1 Tab by mouth every day.   PREBIOTIC PRODUCT PO   Yes No   Sig: Take 2 Tabs by mouth every day.   acetaminophen (TYLENOL) 500 MG TABS  Significant Other Yes No   Sig: Take 1,000 mg by mouth 3 times a day. Indications: Pain   atenolol (TENORMIN) 50 MG Tab   No No   Sig: Take 1 Tab by mouth 2 times a day.   bacitracin-polymyxin b (POLYSPORIN) 500-75777 UNIT/GM Ointment   No No   Sig: Apply 1 Each to affected area(s) 2 times a day.   enoxaparin (LOVENOX) 80 MG/0.8ML Solution inj   No No   Sig: Inject 80 mg as instructed every 12 hours.   estradiol (ESTRACE) 2 MG Tab   No No   Sig: TAKE ONE TABLET BY MOUTH EVERY DAY   furosemide (LASIX) 40 MG Tab   Yes No   Sig: Take 80 mg by mouth every day.   ipratropium (ATROVENT) 0.03 % Solution   No No   Sig: Spray 2 Sprays in nose every 12 hours.   levothyroxine (SYNTHROID) 50 MCG Tab  Significant Other No No   Sig: TAKE 1 TABLET BY MOUTH EVERY MORNING ON AN EMPTY STOMACH.   lisinopril (PRINIVIL) 5 MG Tab  Significant Other No No   Sig: " TAKE ONE TABLET BY MOUTH EVERY DAY   magnesium oxide (MAG-OX) 400 (241.3 Mg) MG Tab tablet   No No   Sig: Take 1 Tab by mouth every day.   raNITidine (ZANTAC) 150 MG Tab  Significant Other No No   Sig: TAKE ONE TABLET BY MOUTH TWICE DAILY   sertraline (ZOLOFT) 50 MG Tab   No No   Sig: Take 1 Tab by mouth every day.   spironolactone (ALDACTONE) 50 MG Tab   No No   Sig: TAKE  ONE TABLET BY MOUTH EVERY MORNING AND EVERY EVENING   terconazole (TERAZOL 3) 0.8 % vaginal cream   No No   Sig: Apply 2 times a day to yeast infection   Patient taking differently: 1 Applicator 2 Times a Day. Feet and butt   vitamin D (CHOLECALCIFEROL) 1000 UNIT TABS  Significant Other Yes No   Sig: Take 2,000 Units by mouth every day.      Facility-Administered Medications: None       Physical Exam  Temp:  [36.2 °C (97.2 °F)] 36.2 °C (97.2 °F)  Pulse:  [80-89] 81  Resp:  [14-15] 15  BP: (122-132)/(42-53) 122/42  SpO2:  [96 %-97 %] 97 %    Physical Exam   Constitutional: She is oriented to person, place, and time. She appears well-developed and well-nourished. No distress.   HENT:   Head: Normocephalic and atraumatic.   Eyes: Pupils are equal, round, and reactive to light. Conjunctivae and EOM are normal.   Neck: Normal range of motion. Neck supple. No JVD present.   Cardiovascular: Normal rate, regular rhythm, normal heart sounds and intact distal pulses.    No murmur heard.  Pulmonary/Chest: Effort normal and breath sounds normal. No respiratory distress. She has no wheezes.   Abdominal: Soft. Bowel sounds are normal. She exhibits no distension. There is no tenderness.   Musculoskeletal:   Right hip anterior with area of erythema and small pus drainage, + fluctuance, edema   Neurological: She is alert and oriented to person, place, and time. She exhibits normal muscle tone.   Skin: Skin is warm and dry.   Psychiatric: She has a normal mood and affect. Her behavior is normal. Judgment and thought content normal.   Nursing note and vitals  reviewed.      Laboratory:  Recent Labs      02/13/19 1957   WBC  11.1*   RBC  4.25   HEMOGLOBIN  13.8   HEMATOCRIT  42.4   MCV  99.8*   MCH  32.5   MCHC  32.5*   RDW  49.8   PLATELETCT  370   MPV  9.2     Recent Labs      02/13/19 1957   SODIUM  136   POTASSIUM  4.4   CHLORIDE  99   CO2  27   GLUCOSE  95   BUN  20   CREATININE  1.03   CALCIUM  9.3     Recent Labs      02/13/19 1957   GLUCOSE  95     Recent Labs     02/12/19   INR  2.1             No results for input(s): TROPONINI in the last 72 hours.    Urinalysis:    No results found     Imaging:  CT-HIP WITH PLUS RECONS RIGHT   Final Result         1. A 12 cm lobulated subcutaneous fluid collection anterolateral to the femoral component of the hip arthroplasty. It may represent postsurgical seroma or a developing abscess. Infection should be excluded clinically. No soft tissue gas.      2. Right hip arthroplasty is well seated. No new fracture or evidence of hardware complication.      3. Incidental bilateral ovarian cysts. They can be followed with a pelvic ultrasound.            Assessment/Plan:  I anticipate this patient will require at least two midnights for appropriate medical management, necessitating inpatient admission.    * Right hip pain   Assessment & Plan    With Ct evidence of 12 cm lobulated fluid collection, abscess vs seroma   Npo after midnight  Pain control for now   Hold on abx not currently septic, plan for intraoperative cultures if indicated  Dr. Daren lozano has been consulted   Pt/ot when able   Need to check coags prior to surgical intervention. On coumadin will hold it for now     Persistent atrial fibrillation (HCC)- (present on admission)   Assessment & Plan    S/p pacemaker, on bb and coumadin  Hold coumadin for now     Multiple drug allergies- (present on admission)   Assessment & Plan    Complicates medical decision making     Obesity (BMI 30-39.9)- (present on admission)   Assessment & Plan    Encouraged weight loss      Peripheral edema   Assessment & Plan    On lasix and sprinolactone?     Cardiac pacemaker in situ- (present on admission)   Assessment & Plan    In place     Depression- (present on admission)   Assessment & Plan    Resume zoloft when able     CAD (coronary artery disease)- (present on admission)   Assessment & Plan    Hx of follows with Dr. Haywood cardiology   On BB, and coumadin      Hypothyroidism- (present on admission)   Assessment & Plan    Resume levothyroxine when able     Chronic anticoagulation- (present on admission)   Assessment & Plan    Check INR now, if elevated needs reversal prior to surgical intervention        Lupus (systemic lupus erythematosus) (HCC)- (present on admission)   Assessment & Plan    Hx of         VTE prophylaxis: coumadin

## 2019-02-14 NOTE — PROGRESS NOTES
Med rec complete per pt at bedside  Allergies have been verified and updated  No oral ABX within the last 30 days  Pt Home Pharm[facility]

## 2019-02-14 NOTE — THERAPY
PT eval order received. Spoke with RN who states patient is likely going to surgery. Will defer eval until post op and appropriate.

## 2019-02-14 NOTE — PROGRESS NOTES
· 2 RN skin check complete with DANNY Perkins.  · Devices in place SCD.  · Skin assessed under devices Intact.  · Confirmed skin breakdowns found on maceration on R and left buttocks (slow to florentin), open wound on R hip surgical site with purulent drainage.  Bilateral heel redness but blanching.  · New potential pressure ulcers noted on R and L buttock. Wound consult placed and wound reported.  Pictures uploaded in EPIC  · The following interventions in place Waffle overlay ordered, Q2 turns, heels floated on pillows

## 2019-02-14 NOTE — PROGRESS NOTES
[facility] Medicine Daily Progress Note    Date of Service  2/14/2019    Chief Complaint  78 y.o. female admitted 2/13/2019 with hip infection.    Hospital Course    PMH afib s/p ablation on AC, lupus, CAD, HTN, HLD, pacemaker, severe osteoarthritis who had elective SOHAN with Dr. Mercedes 1/17/19. She's had increased right hip pain with outpatient XR and US there were unremarkable. CT here showed 12cm lobulated subcut fluid at right hip. Dr. Mejia was consulted and planned for surgery.       Interval Problem Update  Mild leukocytosis  INR 2.2  Surgery today. She had post-op pain now improved control  She says she has taken amoxicillin before a dental procedure and tolerated  Start on amoxicillin and doxcycline, f/u culture    Consultants/Specialty  Ortho    Code Status  Full    Disposition  PTOT  Lives in [place] with     Review of Systems  Review of Systems   Constitutional: Negative for fever.   HENT: Negative for congestion.    Respiratory: Negative for cough and shortness of breath.    Cardiovascular: Negative for chest pain.   Gastrointestinal: Negative for abdominal pain and nausea.   Musculoskeletal: Positive for joint pain.   Skin: Negative for itching.   Neurological: Negative for headaches.        Physical Exam  Temp:  [36.2 °C (97.1 °F)-36.7 °C (98 °F)] 36.7 °C (98 °F)  Pulse:  [78-90] 82  Resp:  [12-20] 12  BP: (102-136)/(42-71) 102/46  SpO2:  [92 %-100 %] 93 %    Physical Exam   Constitutional: She is oriented to person, place, and time. She appears well-developed.   obese   HENT:   Head: Normocephalic.   Mouth/Throat: Oropharynx is clear and moist.   Eyes: Conjunctivae are normal. Right eye exhibits no discharge. Left eye exhibits no discharge.   Cardiovascular: Normal rate and regular rhythm.    Pulmonary/Chest: Effort normal. She has no wheezes.   Abdominal: Soft. There is no tenderness. There is no rebound and no guarding.   Musculoskeletal: She exhibits no edema.   Right hip with clean  dressings in place, tender   Neurological: She is alert and oriented to person, place, and time.   Skin: Skin is warm and dry.   Nursing note and vitals reviewed.      Fluids    Intake/Output Summary (Last 24 hours) at 02/14/19 1647  Last data filed at 02/14/19 1348   Gross per 24 hour   Intake              500 ml   Output               10 ml   Net              490 ml       Laboratory  Recent Labs      02/13/19 1957 02/14/19 0448   WBC  11.1*  11.0*   RBC  4.25  4.01*   HEMOGLOBIN  13.8  12.9   HEMATOCRIT  42.4  40.7   MCV  99.8*  101.5*   MCH  32.5  32.2   MCHC  32.5*  31.7*   RDW  49.8  50.3*   PLATELETCT  370  334   MPV  9.2  9.3     Recent Labs      02/13/19 1957 02/14/19   0448   SODIUM  136  135   POTASSIUM  4.4  4.6   CHLORIDE  99  102   CO2  27  28   GLUCOSE  95  109*   BUN  20  16   CREATININE  1.03  0.96   CALCIUM  9.3  9.3     Recent Labs     02/12/19 02/13/19 1957   INR  2.1  2.20*         Recent Labs      02/14/19 0448   TRIGLYCERIDE  122   HDL  45   LDL  82       Imaging  CT-HIP WITH PLUS RECONS RIGHT   Final Result         1. A 12 cm lobulated subcutaneous fluid collection anterolateral to the femoral component of the hip arthroplasty. It may represent postsurgical seroma or a developing abscess. Infection should be excluded clinically. No soft tissue gas.      2. Right hip arthroplasty is well seated. No new fracture or evidence of hardware complication.      3. Incidental bilateral ovarian cysts. They can be followed with a pelvic ultrasound.           Assessment/Plan  * Right hip pain   Assessment & Plan    With Ct evidence of 12 cm lobulated fluid collection, abscess vs seroma   Hold on abx not currently septic  Ortho has been consulted, surgery today and cultures collected  Per ortho, recommends oral abx. Will start on amoxicillin and doxycycline, f/u culture result  PTOT     Persistent atrial fibrillation (HCC)- (present on admission)   Assessment & Plan    S/p ablation, on atenolol  and coumadin  Hold coumadin for now     Multiple drug allergies- (present on admission)   Assessment & Plan    Complicates medical decision making     Obesity (BMI 30-39.9)- (present on admission)   Assessment & Plan    Encouraged weight loss     Peripheral edema   Assessment & Plan    On lasix and sprinolactone     Cardiac pacemaker in situ- (present on admission)   Assessment & Plan    F/u outpatient     Depression- (present on admission)   Assessment & Plan    Cont zoloft     CAD (coronary artery disease)- (present on admission)   Assessment & Plan    Hx of follows with Dr. Haywood cardiology  Moderate disease on angiogram 2013  Not on asa while on coumadin  Says statin gave her muscle aches     Hypothyroidism- (present on admission)   Assessment & Plan    Recent TSH normal  Cont synthroid     Chronic anticoagulation- (present on admission)   Assessment & Plan    Holding warfarin for surgery       Lupus (systemic lupus erythematosus) (HCC)- (present on admission)   Assessment & Plan    Hx of  asymptomatic          VTE prophylaxis: scds

## 2019-02-14 NOTE — ED NOTES
Pt taken to admitting room by transport. Chart and belongings with patient. No acute signs distress present.

## 2019-02-15 LAB
ANION GAP SERPL CALC-SCNC: 7 MMOL/L (ref 0–11.9)
BASOPHILS # BLD AUTO: 0.7 % (ref 0–1.8)
BASOPHILS # BLD: 0.07 K/UL (ref 0–0.12)
BUN SERPL-MCNC: 14 MG/DL (ref 8–22)
CALCIUM SERPL-MCNC: 8.5 MG/DL (ref 8.5–10.5)
CHLORIDE SERPL-SCNC: 105 MMOL/L (ref 96–112)
CO2 SERPL-SCNC: 25 MMOL/L (ref 20–33)
CREAT SERPL-MCNC: 0.9 MG/DL (ref 0.5–1.4)
EOSINOPHIL # BLD AUTO: 0.57 K/UL (ref 0–0.51)
EOSINOPHIL NFR BLD: 5.7 % (ref 0–6.9)
ERYTHROCYTE [DISTWIDTH] IN BLOOD BY AUTOMATED COUNT: 51.8 FL (ref 35.9–50)
GLUCOSE SERPL-MCNC: 123 MG/DL (ref 65–99)
GRAM STN SPEC: NORMAL
HCT VFR BLD AUTO: 41.4 % (ref 37–47)
HGB BLD-MCNC: 12.5 G/DL (ref 12–16)
IMM GRANULOCYTES # BLD AUTO: 0.04 K/UL (ref 0–0.11)
IMM GRANULOCYTES NFR BLD AUTO: 0.4 % (ref 0–0.9)
INR PPP: 2.06 (ref 0.87–1.13)
LYMPHOCYTES # BLD AUTO: 3.51 K/UL (ref 1–4.8)
LYMPHOCYTES NFR BLD: 35 % (ref 22–41)
MCH RBC QN AUTO: 31.6 PG (ref 27–33)
MCHC RBC AUTO-ENTMCNC: 30.2 G/DL (ref 33.6–35)
MCV RBC AUTO: 104.8 FL (ref 81.4–97.8)
MONOCYTES # BLD AUTO: 0.83 K/UL (ref 0–0.85)
MONOCYTES NFR BLD AUTO: 8.3 % (ref 0–13.4)
NEUTROPHILS # BLD AUTO: 5 K/UL (ref 2–7.15)
NEUTROPHILS NFR BLD: 49.9 % (ref 44–72)
NRBC # BLD AUTO: 0 K/UL
NRBC BLD-RTO: 0 /100 WBC
PLATELET # BLD AUTO: 350 K/UL (ref 164–446)
PMV BLD AUTO: 9.3 FL (ref 9–12.9)
POTASSIUM SERPL-SCNC: 4.2 MMOL/L (ref 3.6–5.5)
PROTHROMBIN TIME: 23.3 SEC (ref 12–14.6)
RBC # BLD AUTO: 3.95 M/UL (ref 4.2–5.4)
SIGNIFICANT IND 70042: NORMAL
SITE SITE: NORMAL
SODIUM SERPL-SCNC: 137 MMOL/L (ref 135–145)
SOURCE SOURCE: NORMAL
WBC # BLD AUTO: 10 K/UL (ref 4.8–10.8)

## 2019-02-15 PROCEDURE — 97161 PT EVAL LOW COMPLEX 20 MIN: CPT

## 2019-02-15 PROCEDURE — A9270 NON-COVERED ITEM OR SERVICE: HCPCS | Performed by: INTERNAL MEDICINE

## 2019-02-15 PROCEDURE — 770006 HCHG ROOM/CARE - MED/SURG/GYN SEMI*

## 2019-02-15 PROCEDURE — 85025 COMPLETE CBC W/AUTO DIFF WBC: CPT

## 2019-02-15 PROCEDURE — 700102 HCHG RX REV CODE 250 W/ 637 OVERRIDE(OP): Performed by: HOSPITALIST

## 2019-02-15 PROCEDURE — 700102 HCHG RX REV CODE 250 W/ 637 OVERRIDE(OP): Performed by: INTERNAL MEDICINE

## 2019-02-15 PROCEDURE — 36415 COLL VENOUS BLD VENIPUNCTURE: CPT

## 2019-02-15 PROCEDURE — 97165 OT EVAL LOW COMPLEX 30 MIN: CPT

## 2019-02-15 PROCEDURE — A9270 NON-COVERED ITEM OR SERVICE: HCPCS | Performed by: HOSPITALIST

## 2019-02-15 PROCEDURE — 99232 SBSQ HOSP IP/OBS MODERATE 35: CPT | Performed by: INTERNAL MEDICINE

## 2019-02-15 PROCEDURE — 700105 HCHG RX REV CODE 258: Performed by: HOSPITALIST

## 2019-02-15 PROCEDURE — 85610 PROTHROMBIN TIME: CPT

## 2019-02-15 PROCEDURE — 80048 BASIC METABOLIC PNL TOTAL CA: CPT

## 2019-02-15 RX ORDER — WARFARIN SODIUM 2.5 MG/1
2.5 TABLET ORAL
Status: DISCONTINUED | OUTPATIENT
Start: 2019-02-15 | End: 2019-02-16 | Stop reason: HOSPADM

## 2019-02-15 RX ORDER — WARFARIN SODIUM 7.5 MG/1
3.75 TABLET ORAL
Status: DISCONTINUED | OUTPATIENT
Start: 2019-02-18 | End: 2019-02-16 | Stop reason: HOSPADM

## 2019-02-15 RX ORDER — WARFARIN SODIUM 2.5 MG/1
2.5-3.75 TABLET ORAL DAILY
COMMUNITY
End: 2019-04-18 | Stop reason: SDUPTHER

## 2019-02-15 RX ADMIN — AMOXICILLIN 250 MG: 250 CAPSULE ORAL at 05:54

## 2019-02-15 RX ADMIN — SPIRONOLACTONE 50 MG: 25 TABLET ORAL at 05:48

## 2019-02-15 RX ADMIN — SODIUM CHLORIDE: 9 INJECTION, SOLUTION INTRAVENOUS at 06:51

## 2019-02-15 RX ADMIN — SENNOSIDES AND DOCUSATE SODIUM 2 TABLET: 8.6; 5 TABLET ORAL at 05:49

## 2019-02-15 RX ADMIN — OXYCODONE HYDROCHLORIDE 10 MG: 10 TABLET ORAL at 18:19

## 2019-02-15 RX ADMIN — AMOXICILLIN 250 MG: 250 CAPSULE ORAL at 22:06

## 2019-02-15 RX ADMIN — LISINOPRIL 5 MG: 2.5 TABLET ORAL at 05:46

## 2019-02-15 RX ADMIN — ATENOLOL 50 MG: 50 TABLET ORAL at 05:52

## 2019-02-15 RX ADMIN — FUROSEMIDE 80 MG: 40 TABLET ORAL at 05:52

## 2019-02-15 RX ADMIN — ATENOLOL 50 MG: 50 TABLET ORAL at 18:17

## 2019-02-15 RX ADMIN — SENNOSIDES AND DOCUSATE SODIUM 2 TABLET: 8.6; 5 TABLET ORAL at 18:17

## 2019-02-15 RX ADMIN — AMOXICILLIN 250 MG: 250 CAPSULE ORAL at 15:50

## 2019-02-15 RX ADMIN — SERTRALINE 50 MG: 50 TABLET, FILM COATED ORAL at 05:45

## 2019-02-15 RX ADMIN — ESTRADIOL 2 MG: 2 TABLET ORAL at 05:51

## 2019-02-15 RX ADMIN — LEVOTHYROXINE SODIUM 50 MCG: 25 TABLET ORAL at 05:49

## 2019-02-15 RX ADMIN — WARFARIN SODIUM 2.5 MG: 2.5 TABLET ORAL at 20:22

## 2019-02-15 ASSESSMENT — ENCOUNTER SYMPTOMS
FEVER: 0
HEADACHES: 0
COUGH: 0
NAUSEA: 0
ABDOMINAL PAIN: 0
SHORTNESS OF BREATH: 0

## 2019-02-15 ASSESSMENT — ACTIVITIES OF DAILY LIVING (ADL): TOILETING: INDEPENDENT

## 2019-02-15 ASSESSMENT — COGNITIVE AND FUNCTIONAL STATUS - GENERAL
STANDING UP FROM CHAIR USING ARMS: A LITTLE
MOBILITY SCORE: 16
SUGGESTED CMS G CODE MODIFIER DAILY ACTIVITY: CJ
DAILY ACTIVITIY SCORE: 22
HELP NEEDED FOR BATHING: A LITTLE
TURNING FROM BACK TO SIDE WHILE IN FLAT BAD: A LITTLE
MOVING TO AND FROM BED TO CHAIR: UNABLE
MOVING FROM LYING ON BACK TO SITTING ON SIDE OF FLAT BED: A LITTLE
CLIMB 3 TO 5 STEPS WITH RAILING: A LITTLE
SUGGESTED CMS G CODE MODIFIER MOBILITY: CK
DRESSING REGULAR LOWER BODY CLOTHING: A LITTLE
WALKING IN HOSPITAL ROOM: A LITTLE

## 2019-02-15 ASSESSMENT — CHA2DS2 SCORE
CHF OR LEFT VENTRICULAR DYSFUNCTION: NO
SEX: FEMALE
HYPERTENSION: YES
VASCULAR DISEASE: YES
AGE 65 TO 74: NO
PRIOR STROKE OR TIA OR THROMBOEMBOLISM: NO
CHA2DS2 VASC SCORE: 5
AGE 75 OR GREATER: YES
DIABETES: NO

## 2019-02-15 ASSESSMENT — GAIT ASSESSMENTS
DISTANCE (FEET): 150
GAIT LEVEL OF ASSIST: SUPERVISED
DEVIATION: ANTALGIC;DECREASED HEEL STRIKE;DECREASED TOE OFF
ASSISTIVE DEVICE: FRONT WHEEL WALKER

## 2019-02-15 NOTE — FACE TO FACE
Face to Face Supporting Documentation - Home Health    The encounter with this patient was in whole or in part the primary reason for home health admission.    Date of encounter:   Patient:                    MRN:                       YOB: 2021  Donte Magaña  [id number]  1940     Home health to see patient for:  Skilled Nursing care for assessment, interventions & education, Physical Therapy evaluation and treatment and Occupational therapy evaluation and treatment    Skilled need for:  Recent Deterioration of Health Status worsening hip pain hafter recent hip replacment with wound infection    Skilled nursing interventions to include:  Comment: medication management, on warfarin    Homebound status evidenced by:  Needs the assistance of another person in order to leave the home. Leaving home requires a considerable and taxing effort. There is a normal inability to leave the home.    Community Physician to provide follow up care: Kishore Price M.D.     Optional Interventions? No      I certify the face to face encounter for this home health care referral meets the CMS requirements and the encounter/clinical assessment with the patient was, in whole, or in part, for the medical condition(s) listed above, which is the primary reason for home health care. Based on my clinical findings: the service(s) are medically necessary, support the need for home health care, and the homebound criteria are met.  I certify that this patient has had a face to face encounter by myself.  Alida Portillo M.D. - NPI: [id number]

## 2019-02-15 NOTE — DISCHARGE PLANNING
Anticipated Discharge Disposition: Home with HH    Action: Met with patient anticipates dc home tomorrow and is agreeable to [facility], verbal choice obtained and faxed to [facility]    Barriers to Discharge: medical clearance and SNF acceptance.     Plan: Home with HH

## 2019-02-15 NOTE — THERAPY
"Physical Therapy Evaluation completed.   Bed Mobility:  Supine to Sit:  (EOB)  Transfers: Sit to Stand: Supervised  Gait: Level Of Assist: Supervised with Front-Wheel Walker       Plan of Care: Patient with no further skilled PT needs in the acute care setting at this time  Discharge Recommendations: Equipment: No Equipment Needed. Post-acute therapy Recommend outpatient or home health transitional care services for continued physical therapy services.    Ms. Magaña is an 79 y/o female who presents to acute secondary to proximal pole wound dehiscence of R SOHAN (original surgery in Jan 2019). Sh eis s/p secondary closure and is WBAT. She was able to perform gait, transfers, and stairs without physical assist. She did require some help with bed mobility but reports her spouse has been doing this at home and can continue to do so. No additional acute physical therapy needs. Strongly recommend outpatient physical therapy due to poor functional progress after initial surgery in Jan and patient's desires to improve to gait with a cane. As she has residual R hip weakness outpatient PT can progress her to her desired level of physical activity and improve her overall quality of life. REcommend continue to ambulate regularly with nursing staff to maintian current level of function.    See \"Rehab Therapy-Acute\" Patient Summary Report for complete documentation.     "

## 2019-02-15 NOTE — PROGRESS NOTES
"S:  Seen and examined.  POD #1 s/p R hip wound I&D.  Doing well this morning.  No new complaints, pain controlled.    O: Blood pressure 130/59, pulse 88, temperature 36.3 °C (97.4 °F), temperature source Temporal, resp. rate 17, height 1.626 m (5' 4\"), weight 83.9 kg (185 lb), last menstrual period 01/01/1993, SpO2 96 %, not currently breastfeeding..    Intake/Output Summary (Last 24 hours) at 02/15/19 0947  Last data filed at 02/14/19 1630   Gross per 24 hour   Intake             1250 ml   Output               10 ml   Net             1240 ml   .    Operative/injured extremity examined.  Compartments soft, distal light touch sensation intact, firing EHL/TA/GS/P.  Toes warm, well-perfused.  Wound vac to suction    Recent Labs      02/13/19 1957 02/14/19   0448  02/15/19   0417   WBC  11.1*  11.0*  10.0   RBC  4.25  4.01*  3.95*   HEMOGLOBIN  13.8  12.9  12.5   HEMATOCRIT  42.4  40.7  41.4   MCV  99.8*  101.5*  104.8*   MCH  32.5  32.2  31.6   MCHC  32.5*  31.7*  30.2*   RDW  49.8  50.3*  51.8*   PLATELETCT  370  334  350   MPV  9.2  9.3  9.3       A/P:    POD #1 s/p limited R hip wound I&D    Antibiotics: Transition to orals  Activity: WBAT operative extremity.  PT today.  Diet: General  DVT: Mechanical (SCDs) + Pharmacologic (Aspirin)  Dispo: D/C planning.  OK to d/c home tomorrow with oral Keflex if cultures negative.    "

## 2019-02-15 NOTE — PROGRESS NOTES
Hospital Medicine Daily Progress Note    Date of Service  2/15/2019    Chief Complaint  78 y.o. female admitted 2/13/2019 with hip infection.    Hospital Course    PMH afib s/p ablation on AC, lupus, CAD, HTN, HLD, pacemaker, severe osteoarthritis who had elective SOHAN with Dr. Mercedes 1/17/19. She's had increased right hip pain with outpatient XR and US there were unremarkable. CT here showed 12cm lobulated subcut fluid at right hip. Dr. Mejia was consulted and planned for surgery.       Interval Problem Update  INR 2.06. Restart on warfarin  Continued on amoxicillin. Wound culture pending, no growth thus far  Pain is well controlled. Working with PTOT  Tolerating amoxicillin    Consultants/Specialty  Ortho    Code Status  Full    Disposition  Home health ordered  Lives in [place] with     Review of Systems  Review of Systems   Constitutional: Negative for fever.   HENT: Negative for congestion.    Respiratory: Negative for cough and shortness of breath.    Cardiovascular: Negative for chest pain.   Gastrointestinal: Negative for abdominal pain and nausea.   Musculoskeletal: Positive for joint pain (improved).   Skin: Negative for itching.   Neurological: Negative for headaches.        Physical Exam  Temp:  [36.1 °C (97 °F)-36.7 °C (98 °F)] 36.4 °C (97.5 °F)  Pulse:  [79-90] 82  Resp:  [12-17] 17  BP: ()/(40-76) 114/53  SpO2:  [92 %-99 %] 96 %    Physical Exam   Constitutional: She is oriented to person, place, and time. She appears well-developed.   obese   HENT:   Head: Normocephalic.   Mouth/Throat: Oropharynx is clear and moist.   Eyes: Conjunctivae are normal. Right eye exhibits no discharge. Left eye exhibits no discharge.   Cardiovascular: Normal rate and regular rhythm.    Pulmonary/Chest: Effort normal. She has no wheezes.   Abdominal: Soft. There is no tenderness.   Musculoskeletal: She exhibits no edema.   Right hip with clean dressings in place, mild tenderness   Neurological: She is alert  and oriented to person, place, and time.   Skin: Skin is warm and dry.   Nursing note and vitals reviewed.      Fluids    Intake/Output Summary (Last 24 hours) at 02/15/19 1511  Last data filed at 02/14/19 1630   Gross per 24 hour   Intake              750 ml   Output                0 ml   Net              750 ml       Laboratory  Recent Labs      02/13/19   1957  02/14/19   0448  02/15/19   0417   WBC  11.1*  11.0*  10.0   RBC  4.25  4.01*  3.95*   HEMOGLOBIN  13.8  12.9  12.5   HEMATOCRIT  42.4  40.7  41.4   MCV  99.8*  101.5*  104.8*   MCH  32.5  32.2  31.6   MCHC  32.5*  31.7*  30.2*   RDW  49.8  50.3*  51.8*   PLATELETCT  370  334  350   MPV  9.2  9.3  9.3     Recent Labs      02/13/19   1957  02/14/19   0448  02/15/19   0417   SODIUM  136  135  137   POTASSIUM  4.4  4.6  4.2   CHLORIDE  99  102  105   CO2  27  28  25   GLUCOSE  95  109*  123*   BUN  20  16  14   CREATININE  1.03  0.96  0.90   CALCIUM  9.3  9.3  8.5     Recent Labs      02/13/19   1957  02/15/19   0417   INR  2.20*  2.06*         Recent Labs      02/14/19 0448   TRIGLYCERIDE  122   HDL  45   LDL  82       Imaging  CT-HIP WITH PLUS RECONS RIGHT   Final Result         1. A 12 cm lobulated subcutaneous fluid collection anterolateral to the femoral component of the hip arthroplasty. It may represent postsurgical seroma or a developing abscess. Infection should be excluded clinically. No soft tissue gas.      2. Right hip arthroplasty is well seated. No new fracture or evidence of hardware complication.      3. Incidental bilateral ovarian cysts. They can be followed with a pelvic ultrasound.           Assessment/Plan  * Right hip pain   Assessment & Plan    With Ct evidence of 12 cm lobulated fluid collection, abscess vs seroma   Hold on abx not currently septic  Ortho has been consulted, surgery today and cultures collected  Per ortho, recommends oral abx. Will start on amoxicillin, f/u culture result  PTOT     Persistent atrial fibrillation  (HCC)- (present on admission)   Assessment & Plan    S/p ablation, on atenolol and coumadin       Multiple drug allergies- (present on admission)   Assessment & Plan    Complicates medical decision making     Obesity (BMI 30-39.9)- (present on admission)   Assessment & Plan    Encouraged weight loss     Peripheral edema   Assessment & Plan    On lasix and sprinolactone     Cardiac pacemaker in situ- (present on admission)   Assessment & Plan    F/u outpatient     Depression- (present on admission)   Assessment & Plan    Cont zoloft     CAD (coronary artery disease)- (present on admission)   Assessment & Plan    Hx of follows with Dr. Haywood cardiology  Moderate disease on angiogram 2013  Not on asa while on coumadin  Says statin gave her muscle aches     Hypothyroidism- (present on admission)   Assessment & Plan    Recent TSH normal  Cont synthroid     Chronic anticoagulation- (present on admission)   Assessment & Plan    Restart warfarin       Lupus (systemic lupus erythematosus) (HCC)- (present on admission)   Assessment & Plan    Hx of  asymptomatic          VTE prophylaxis: warfarin

## 2019-02-15 NOTE — CARE PLAN
Problem: Communication  Goal: The ability to communicate needs accurately and effectively will improve  Outcome: PROGRESSING AS EXPECTED  Discussed POC with patient Updated white board Calls appropriately  Call light within reach.    Problem: Mobility  Goal: Risk for activity intolerance will decrease  Outcome: PROGRESSING AS EXPECTED  Patient up to bathroom with min assist x1 using a FWW, steady gait, tolerated well.

## 2019-02-16 ENCOUNTER — HOME HEALTH ADMISSION (OUTPATIENT)
Dept: HOME HEALTH SERVICES | Facility: HOME HEALTHCARE | Age: 79
End: 2019-02-16
Payer: MEDICARE

## 2019-02-16 VITALS
HEART RATE: 81 BPM | HEIGHT: 64 IN | WEIGHT: 185 LBS | RESPIRATION RATE: 16 BRPM | SYSTOLIC BLOOD PRESSURE: 114 MMHG | BODY MASS INDEX: 31.58 KG/M2 | OXYGEN SATURATION: 97 % | TEMPERATURE: 97 F | DIASTOLIC BLOOD PRESSURE: 45 MMHG

## 2019-02-16 LAB
BACTERIA WND AEROBE CULT: NORMAL
GRAM STN SPEC: NORMAL
INR PPP: 1.51 (ref 0.87–1.13)
PROTHROMBIN TIME: 18.3 SEC (ref 12–14.6)
SIGNIFICANT IND 70042: NORMAL
SITE SITE: NORMAL
SOURCE SOURCE: NORMAL

## 2019-02-16 PROCEDURE — 700102 HCHG RX REV CODE 250 W/ 637 OVERRIDE(OP): Performed by: INTERNAL MEDICINE

## 2019-02-16 PROCEDURE — 36415 COLL VENOUS BLD VENIPUNCTURE: CPT

## 2019-02-16 PROCEDURE — A9270 NON-COVERED ITEM OR SERVICE: HCPCS | Performed by: INTERNAL MEDICINE

## 2019-02-16 PROCEDURE — 700102 HCHG RX REV CODE 250 W/ 637 OVERRIDE(OP): Performed by: HOSPITALIST

## 2019-02-16 PROCEDURE — 99239 HOSP IP/OBS DSCHRG MGMT >30: CPT | Performed by: INTERNAL MEDICINE

## 2019-02-16 PROCEDURE — 85610 PROTHROMBIN TIME: CPT

## 2019-02-16 PROCEDURE — A9270 NON-COVERED ITEM OR SERVICE: HCPCS | Performed by: HOSPITALIST

## 2019-02-16 RX ORDER — AMOXICILLIN 250 MG/1
250 CAPSULE ORAL EVERY 8 HOURS
Qty: 15 CAP | Refills: 0 | Status: SHIPPED | OUTPATIENT
Start: 2019-02-16 | End: 2019-02-21

## 2019-02-16 RX ADMIN — AMOXICILLIN 250 MG: 250 CAPSULE ORAL at 07:22

## 2019-02-16 RX ADMIN — FAMOTIDINE 20 MG: 20 TABLET ORAL at 04:41

## 2019-02-16 RX ADMIN — ATENOLOL 50 MG: 50 TABLET ORAL at 04:40

## 2019-02-16 RX ADMIN — SENNOSIDES AND DOCUSATE SODIUM 2 TABLET: 8.6; 5 TABLET ORAL at 04:40

## 2019-02-16 RX ADMIN — SERTRALINE 50 MG: 50 TABLET, FILM COATED ORAL at 04:41

## 2019-02-16 RX ADMIN — ESTRADIOL 2 MG: 2 TABLET ORAL at 04:40

## 2019-02-16 RX ADMIN — LEVOTHYROXINE SODIUM 50 MCG: 25 TABLET ORAL at 04:40

## 2019-02-16 RX ADMIN — LISINOPRIL 5 MG: 2.5 TABLET ORAL at 04:40

## 2019-02-16 RX ADMIN — OXYCODONE HYDROCHLORIDE 10 MG: 10 TABLET ORAL at 00:55

## 2019-02-16 NOTE — CARE PLAN
Problem: Safety  Goal: Will remain free from falls  Non-skid socks on and belongings within reach. Call light within reach. Hourly rounding in place.        Problem: Venous Thromboembolism (VTW)/Deep Vein Thrombosis (DVT) Prevention:  Goal: Patient will participate in Venous Thrombosis (VTE)/Deep Vein Thrombosis (DVT)Prevention Measures  SCDs to BLE.  Mobilization at least three times a day with staff and PT/OT.

## 2019-02-16 NOTE — DISCHARGE SUMMARY
Discharge Summary    CHIEF COMPLAINT ON ADMISSION  Chief Complaint   Patient presents with   • Post-Op Complications       Reason for Admission  Post op Complication     Admission Date  2/13/2019    CODE STATUS  Full    HPI & HOSPITAL COURSE  This is a 78 y.o. female here with right hip pain.    PMH afib s/p ablation on AC, lupus, CAD, HTN, HLD, pacemaker, severe osteoarthritis who had elective SOHAN with Dr. Mercedes 1/17/19. She's had increased right hip pain with outpatient XR and US there were unremarkable. CT here showed 12cm lobulated subcutaneous fluid at right hip. Dr. Lee with ortho performed I&D 2/14/19, with subcutaneous tissue excised without deep tracking, provena was placed.   Wound culture had no growth. She was empirically started on amoxicillin and tolerated well.   She was discharged with provena in place and instructed to follow up with Dr. Mercedes. PTOT recommended home health, which was ordered.    Therefore, she is discharged in good and stable condition to home with organized home healthcare and close outpatient follow-up.    The patient met 2-midnight criteria for an inpatient stay at the time of discharge.    Discharge Date  2/16/2019    FOLLOW UP ITEMS POST DISCHARGE  F/u final wound cultures  Management of provena    DISCHARGE DIAGNOSES  Principal Problem:    Right hip pain POA: Yes  Active Problems:    Persistent atrial fibrillation (HCC) POA: Yes    Obesity (BMI 30-39.9) POA: Yes    Multiple drug allergies POA: Yes    Lupus (systemic lupus erythematosus) (HCC) POA: Yes    Chronic anticoagulation POA: Yes    Hypothyroidism POA: Yes    CAD (coronary artery disease) POA: Yes      Overview: Angiogram 2012:1. A 70% mid right coronary artery eccentric stenosis.       2. A 40% proximal left anterior descending artery stenosis.          Depression POA: Yes    Cardiac pacemaker in situ POA: Yes    Peripheral edema POA: Yes  Resolved Problems:    * No resolved hospital problems. *      FOLLOW  UP  Future Appointments  Date Time Provider Department Center   5/3/2019 1:15 PM PACER CHECK-[facility] 2 [facility] None   5/3/2019 1:30 PM Lizzy Haywood M.D. [facility] None     Juan C Mercedes M.D.  [address] [place] [address]  [phone]    Call in 1 week  surgical follow up      MEDICATIONS ON DISCHARGE     Medication List      START taking these medications      Instructions   amoxicillin 250 MG Caps  Commonly known as:  AMOXIL   Take 1 Cap by mouth every 8 hours for 5 days.  Dose:  250 mg        CONTINUE taking these medications      Instructions   atenolol 50 MG Tabs  Commonly known as:  TENORMIN   Take 1 Tab by mouth 2 times a day.  Dose:  50 mg     bacitracin-polymyxin b 500-43731 UNIT/GM Oint  Commonly known as:  POLYSPORIN   Apply 1 Each to affected area(s) 2 times a day.  Dose:  1 Each     estradiol 2 MG Tabs  Commonly known as:  ESTRACE   TAKE ONE TABLET BY MOUTH EVERY DAY     furosemide 40 MG Tabs  Commonly known as:  LASIX   Take 80 mg by mouth every day.  Dose:  80 mg     ipratropium 0.03 % Soln  Commonly known as:  ATROVENT   Spray 2 Sprays in nose every 12 hours.  Dose:  2 Spray     levothyroxine 50 MCG Tabs  Commonly known as:  SYNTHROID   TAKE 1 TABLET BY MOUTH EVERY MORNING ON AN EMPTY STOMACH.     lisinopril 5 MG Tabs  Commonly known as:  PRINIVIL   TAKE ONE TABLET BY MOUTH EVERY DAY     Lutein 20 MG Caps   Take 1 Cap by mouth every day.  Dose:  1 Cap     magnesium oxide 400 (241.3 Mg) MG Tabs tablet  Commonly known as:  MAG-OX   Take 1 Tab by mouth every day.  Dose:  400 mg     oxyCODONE immediate release 10 MG immediate release tablet  Commonly known as:  ROXICODONE   Take 10 mg by mouth 2 times a day as needed for Severe Pain.  Dose:  10 mg     PREBIOTIC PRODUCT PO   Take 2 Tabs by mouth every day.  Dose:  2 Tab     raNITidine 150 MG Tabs  Commonly known as:  ZANTAC   TAKE ONE TABLET BY MOUTH TWICE DAILY     sertraline 50 MG Tabs  Commonly known as:  ZOLOFT   Take 1 Tab by mouth every  "day.  Dose:  50 mg     spironolactone 50 MG Tabs  Commonly known as:  ALDACTONE   TAKE  ONE TABLET BY MOUTH EVERY MORNING AND EVERY EVENING     vitamin D 1000 UNIT Tabs  Commonly known as:  cholecalciferol   Take 2,000 Units by mouth every day.  Dose:  2000 Units     warfarin 2.5 MG Tabs  Commonly known as:  COUMADIN   Take 2.5-3.75 mg by mouth every day. Take 3.375 mg Mon/Thu, 2.5 mg all other days ([facility] clinic notes from 2/12)  Dose:  2.5-3.75 mg        STOP taking these medications    terconazole 0.8 % vaginal cream  Commonly known as:  TERAZOL 3            Allergies  Allergies   Allergen Reactions   • Amiodarone Hives     Throat and tongue itching   • Bactrim Shortness of Breath   • Cipro Xr Swelling   • Metoprolol Swelling     Causes throat swelling   • Morphine Unspecified     Hallucinations   • Phytoplex Z-Guard [Petrolatum-Zinc Oxide] Unspecified     \"causes burning\"   • Pseudoephedrine Palpitations   • Qvar [Beclomethasone Dipropionate] Unspecified     Pressure on heart     • Vibramycin Shortness of Breath   • Atorvastatin Calcium-Polysorbate 80 Unspecified     Muscle aches     • Augmentin Unspecified     Unknown reaction   • Diltiazem Rash     rash   • Flecainide Unspecified     dizziness   • Keflex Unspecified     Pt states \"Unsure\".   • Mucinex Unspecified     GI Distress     • Tramadol Unspecified     crying   • Atorvastatin Myalgia   • Tape Rash     Paper tape okay       DIET  No orders of the defined types were placed in this encounter.      ACTIVITY  As tolerated.  Weight bearing as tolerated    CONSULTATIONS  Ortho    PROCEDURES  PROCEDURE PERFORMED:  Secondary closure of surgical wound dehiscence.    CT-HIP WITH PLUS RECONS RIGHT   Final Result         1. A 12 cm lobulated subcutaneous fluid collection anterolateral to the femoral component of the hip arthroplasty. It may represent postsurgical seroma or a developing abscess. Infection should be excluded clinically. No soft tissue gas.      2. " Right hip arthroplasty is well seated. No new fracture or evidence of hardware complication.      3. Incidental bilateral ovarian cysts. They can be followed with a pelvic ultrasound.            LABORATORY  Lab Results   Component Value Date    SODIUM 137 02/15/2019    POTASSIUM 4.2 02/15/2019    CHLORIDE 105 02/15/2019    CO2 25 02/15/2019    GLUCOSE 123 (H) 02/15/2019    BUN 14 02/15/2019    CREATININE 0.90 02/15/2019    CREATININE 0.78 07/29/2010    GLOMRATE >59 07/29/2010        Lab Results   Component Value Date    WBC 10.0 02/15/2019    WBC 9.3 07/29/2010    HEMOGLOBIN 12.5 02/15/2019    HEMATOCRIT 41.4 02/15/2019    PLATELETCT 350 02/15/2019        Total time of the discharge process exceeds 32 minutes.

## 2019-02-16 NOTE — THERAPY
"Occupational Therapy Evaluation completed.   Functional Status:  Pt in bathroom upon OT arrival and completed toilet transfer/toileting mod independent.  Pt requires min-mod A w/LB dressing.  Pt able to transfer from bed to chair supervised.  Plan of Care: Patient with no further skilled OT needs in the acute care setting at this time  Discharge Recommendations:  Equipment: No Equipment Needed.     Pt is 79 y/o F seen for OT evaluation. Pt underwent SOHAN in January and returned with wound dihis. Pt is s/p washout and wound closure. Pt reports high pain but feels at she is at her functional baseline with self-care. Pt has good support from her spouse. Reviewed ADL's/adaptive strategies w/pt. Pt with no further acute OT needs at this time.    See \"Rehab Therapy-Acute\" Patient Summary Report for complete documentation.    "

## 2019-02-16 NOTE — PROGRESS NOTES
MDs have signed off on patient to go home today. Patient will be leaving today with  to go home. Discharge instructions given and patient has no questions or concerns at this time. Prescriptions sent to pharmacy. DME not needed this admission. RN took patient out by wheelchair with .

## 2019-02-16 NOTE — CARE PLAN
Problem: Safety  Goal: Will remain free from injury  Outcome: PROGRESSING AS EXPECTED  Fall Precautions in place: Non-skid socks on, bed locked and in lowest position, upper bed rails up, DME in bathroom, call light within reach, Patient calls appropriately.    Problem: Venous Thromboembolism (VTW)/Deep Vein Thrombosis (DVT) Prevention:  Goal: Patient will participate in Venous Thrombosis (VTE)/Deep Vein Thrombosis (DVT)Prevention Measures   02/15/19 2000   Mechanical/VTE Prophylaxis   Mechanical Prophylaxis  SCDs, Sequential Compression Device   SCDs, Sequential Compression Device On   OTHER   Risk Assessment Score 3   VTE RISK High   Pharmacologic Prophylaxis Used Warfarin (Coumadin)

## 2019-02-16 NOTE — DISCHARGE PLANNING
ATTN: Case Management  RE: Referral for Home Health    As of 2/16/19, we have accepted the Home Health referral for the patient listed above.    A [facility] clinician will be out to see the patient within 48 hours. If you have any questions or concerns regarding the patient’s transition to Home Health, please do not hesitate to contact us at [phone].      We look forward to collaborating with you,  [facility] Team

## 2019-02-16 NOTE — CARE PLAN
Problem: Safety  Goal: Will remain free from falls    Intervention: Assess risk factors for falls  Low fall risk, calls as appropriate, alert and oriented.      Problem: Pain Management  Goal: Pain level will decrease to patient's comfort goal    Intervention: Follow pain managment plan developed in collaboration with patient and Interdisciplinary Team  Pain well controlled with oral medications

## 2019-02-16 NOTE — PROGRESS NOTES
"ORTHOPAEDIC SURGERY PROGRESS NOTE     Patient seen and examined. Pain controlled. Occasional muscle spasms, otherwise no pain related to anterior hip or wound.    Blood pressure 125/59, pulse 84, temperature 36.8 °C (98.2 °F), temperature source Temporal, resp. rate 16, height 1.626 m (5' 4\"), weight 83.9 kg (185 lb), last menstrual period 01/01/1993, SpO2 93 %, not currently breastfeeding.    Laboratory Values  Recent Labs      02/13/19   1957  02/14/19   0448  02/15/19   0417   WBC  11.1*  11.0*  10.0   RBC  4.25  4.01*  3.95*   HEMOGLOBIN  13.8  12.9  12.5   HEMATOCRIT  42.4  40.7  41.4   MCV  99.8*  101.5*  104.8*   MCH  32.5  32.2  31.6   MCHC  32.5*  31.7*  30.2*   RDW  49.8  50.3*  51.8*   PLATELETCT  370  334  350   MPV  9.2  9.3  9.3     Recent Labs      02/13/19   1957  02/14/19   0448  02/15/19   0417   SODIUM  136  135  137   POTASSIUM  4.4  4.6  4.2   CHLORIDE  99  102  105   CO2  27  28  25   GLUCOSE  95  109*  123*   BUN  20  16  14     Recent Labs      02/13/19   1957  02/15/19   0417  02/16/19   0452   INR  2.20*  2.06*  1.51*         Physical Exam  Gen: no acute distress, nontoxic  Extremities:  RLE:   -postop dressings & prevena clean and dry, suction no leak   -no surrounding erythema or fullness, area of prior hematoma nontender    -SILT DP/SP/saph/sural nerves   -+TA/EHL/GCS/Peroneals   -all compartments soft and compressible, no pain with PROM    -skin warm and well-perfused, brisk capillary refill      Assessment  78F status post SOHAN by Dr Mercedes recently  -development of proximal incision dehiscence in pannus skin fold, s/p I&D, cx, prevena 2/14    Plan  Admission: inpatient  Antibiotics: amox empirically, cxs thus far NGTD  Analgesia: multimodal   Activity/WB: WBAT, PT/OT  Anticoagulation: coumadin   Diet: advance as tolerated  Additional surgery: none planned  Disposition: cultures thus far no growth ~24hrs, will monitor today      Trey Curiel DO, MSc    "

## 2019-02-16 NOTE — PROGRESS NOTES
Inpatient Anticoagulation Service Note    Date: 2/15/2019  Reason for Anticoagulation: Atrial Fibrillation   [id number] VASc Score: 5    Hemoglobin Value: 12.5  Hematocrit Value: 41.4  Lab Platelet Value: 350  Target INR: 2.0 to 3.0    INR from last 7 days     Date/Time INR Value    02/15/19 0417 (!)  2.06    02/13/19 1957 (!)  2.2        Dose from last 7 days     Date/Time Dose (mg)    02/15/19 1700  2.5        Average Dose (mg): 3.75 mg Mon/Thu, 2.5 mg AOD (Anticoag clinic notes 2/12)  Significant Interactions: Antibiotics, Thyroid Medications  Bridge Therapy: No   Reversal Agent Administered: Not Applicable    Comments: Pt resuming warfarin after surgery to close wound dehiscence yesterday. H/H/Plt are WNL and no bleeding noted. Will continue pt's home dosing as INR is therapeutic.    Plan: Warfarin 2.5 mg tonight and INR tomorrow AM  Education Material Provided?: No (Chronic warfarin pt)  Pharmacist suggested discharge dosing: 3.75 mg Mon/Thu and 2.5 mg all other days     Mar Cooley, PeterD, BCPS

## 2019-02-16 NOTE — DISCHARGE INSTRUCTIONS
Discharge Instructions    Discharged to home by car with relative. Discharged via wheelchair, hospital escort: Yes.  Special equipment needed: Not Applicable    Be sure to schedule a follow-up appointment with your primary care doctor or any specialists as instructed.     Discharge Plan:   Diet Plan: Discussed  Activity Level: Discussed  Confirmed Follow up Appointment: Patient to Call and Schedule Appointment  Confirmed Symptoms Management: Discussed  Medication Reconciliation Updated: Yes  Pneumococcal Vaccine Administered/Refused: Not given - Patient refused pneumococcal vaccine  Influenza Vaccine Indication: Patient Refuses    I understand that a diet low in cholesterol, fat, and sodium is recommended for good health. Unless I have been given specific instructions below for another diet, I accept this instruction as my diet prescription.   Other diet: Regular    Special Instructions: Discharge instructions for the Orthopedic Patient    Follow up with Primary Care Physician within 2 weeks of discharge to home, regarding:  Review of medications and diagnostic testing.  Surveillance for medical complications.  Workup and treatment of osteoporosis, if appropriate.     -Is this a Joint Replacement patient? No    -Is this patient being discharged with medication to prevent blood clots?  No    · Is patient discharged on Warfarin / Coumadin?   Yes    You are receiving the drug warfarin. Please understand the importance of monitoring warfarin with scheduled PT/INR blood draws.  Follow-up with the [facility] in one week for INR lab..    IMPORTANT: HOW TO USE THIS INFORMATION:  This is a summary and does NOT have all possible information about this product. This information does not assure that this product is safe, effective, or appropriate for you. This information is not individual medical advice and does not substitute for the advice of your health care professional. Always ask your health care professional for  "complete information about this product and your specific health needs.      WARFARIN - ORAL (WARF-uh-rin)      COMMON BRAND NAME(S): Coumadin      WARNING:  Warfarin can cause very serious (possibly fatal) bleeding. This is more likely to occur when you first start taking this medication or if you take too much warfarin. To decrease your risk for bleeding, your doctor or other health care provider will monitor you closely and check your lab results (INR test) to make sure you are not taking too much warfarin. Keep all medical and laboratory appointments. Tell your doctor right away if you notice any signs of serious bleeding. See also Side Effects section.      USES:  This medication is used to treat blood clots (such as in deep vein thrombosis-DVT or pulmonary embolus-PE) and/or to prevent new clots from forming in your body. Preventing harmful blood clots helps to reduce the risk of a stroke or heart attack. Conditions that increase your risk of developing blood clots include a certain type of irregular heart rhythm (atrial fibrillation), heart valve replacement, recent heart attack, and certain surgeries (such as hip/knee replacement). Warfarin is commonly called a \"blood thinner,\" but the more correct term is \"anticoagulant.\" It helps to keep blood flowing smoothly in your body by decreasing the amount of certain substances (clotting proteins) in your blood.      HOW TO USE:  Read the Medication Guide provided by your pharmacist before you start taking warfarin and each time you get a refill. If you have any questions, ask your doctor or pharmacist. Take this medication by mouth with or without food as directed by your doctor or other health care professional, usually once a day. It is very important to take it exactly as directed. Do not increase the dose, take it more frequently, or stop using it unless directed by your doctor. Dosage is based on your medical condition, laboratory tests (such as INR), and " response to treatment. Your doctor or other health care provider will monitor you closely while you are taking this medication to determine the right dose for you. Use this medication regularly to get the most benefit from it. To help you remember, take it at the same time each day. It is important to eat a balanced, consistent diet while taking warfarin. Some foods can affect how warfarin works in your body and may affect your treatment and dose. Avoid sudden large increases or decreases in your intake of foods high in vitamin K (such as broccoli, cauliflower, cabbage, brussels sprouts, kale, spinach, and other green leafy vegetables, liver, green tea, certain vitamin supplements). If you are trying to lose weight, check with your doctor before you try to go on a diet. Cranberry products may also affect how your warfarin works. Limit the amount of cranberry juice (16 ounces/480 milliliters a day) or other cranberry products you may drink or eat.      SIDE EFFECTS:  Nausea, loss of appetite, or stomach/abdominal pain may occur. If any of these effects persist or worsen, tell your doctor or pharmacist promptly. Remember that your doctor has prescribed this medication because he or she has judged that the benefit to you is greater than the risk of side effects. Many people using this medication do not have serious side effects. This medication can cause serious bleeding if it affects your blood clotting proteins too much (shown by unusually high INR lab results). Even if your doctor stops your medication, this risk of bleeding can continue for up to a week. Tell your doctor right away if you have any signs of serious bleeding, including: unusual pain/swelling/discomfort, unusual/easy bruising, prolonged bleeding from cuts or gums, persistent/frequent nosebleeds, unusually heavy/prolonged menstrual flow, pink/dark urine, coughing up blood, vomit that is bloody or looks like coffee grounds, severe headache,  dizziness/fainting, unusual or persistent tiredness/weakness, bloody/black/tarry stools, chest pain, shortness of breath, difficulty swallowing. Tell your doctor right away if any of these unlikely but serious side effects occur: persistent nausea/vomiting, severe stomach/abdominal pain, yellowing eyes/skin. This drug rarely has caused very serious (possibly fatal) problems if its effects lead to small blood clots (usually at the beginning of treatment). This can lead to severe skin/tissue damage that may require surgery or amputation if left untreated. Patients with certain blood conditions (protein C or S deficiency) may be at greater risk. Get medical help right away if any of these rare but serious side effects occur: painful/red/purplish patches on the skin (such as on the toe, breast, abdomen), change in the amount of urine, vision changes, confusion, slurred speech, weakness on one side of the body. A very serious allergic reaction to this drug is rare. However, get medical help right away if you notice any symptoms of a serious allergic reaction, including: rash, itching/swelling (especially of the face/tongue/throat), severe dizziness, trouble breathing. This is not a complete list of possible side effects. If you notice other effects not listed above, contact your doctor or pharmacist. In the [place] - Call your doctor for medical advice about side effects. You may report side effects to FDA at 1-372-WSI-2540. In [place] - Call your doctor for medical advice about side effects. You may report side effects to [facility] [place] at 1-407.331.1473.      PRECAUTIONS:  Before taking warfarin, tell your doctor or pharmacist if you are allergic to it; or if you have any other allergies. This product may contain inactive ingredients, which can cause allergic reactions or other problems. Talk to your pharmacist for more details. Before using this medication, tell your doctor or pharmacist your medical history, especially of:  blood disorders (such as anemia, hemophilia), bleeding problems (such as bleeding of the stomach/intestines, bleeding in the brain), blood vessel disorders (such as aneurysms), recent major injury/surgery, liver disease, alcohol use, mental/mood disorders (including memory problems), frequent falls/injuries. It is important that all your doctors and dentists know that you take warfarin. Before having surgery or any medical/dental procedures, tell your doctor or dentist that you are taking this medication and about all the products you use (including prescription drugs, nonprescription drugs, and herbal products). Avoid getting injections into the muscles. If you must have an injection into a muscle (for example, a flu shot), it should be given in the arm. This way, it will be easier to check for bleeding and/or apply pressure bandages. This medication may cause stomach bleeding. Daily use of alcohol while using this medicine will increase your risk for stomach bleeding and may also affect how this medication works. Limit or avoid alcoholic beverages. If you have not been eating well, if you have an illness or infection that causes fever, vomiting, or diarrhea for more than 2 days, or if you start using any antibiotic medications, contact your doctor or pharmacist immediately because these conditions can affect how warfarin works. This medication can cause heavy bleeding. To lower the chance of getting cut, bruised, or injured, use great caution with sharp objects like safety razors and nail cutters. Use an electric razor when shaving and a soft toothbrush when brushing your teeth. Avoid activities such as contact sports. If you fall or injure yourself, especially if you hit your head, call your doctor immediately. Your doctor may need to check you. The Food & Drug Administration has stated that generic warfarin products are interchangeable. However, consult your doctor or pharmacist before switching warfarin  "products. Be careful not to take more than one medication that contains warfarin unless specifically directed by the doctor or health care provider who is monitoring your warfarin treatment. Older adults may be at greater risk for bleeding while using this drug. This medication is not recommended for use during pregnancy because of serious (possibly fatal) harm to an unborn baby. Discuss the use of reliable forms of birth control with your doctor. If you become pregnant or think you may be pregnant, tell your doctor immediately. If you are planning pregnancy, discuss a plan for managing your condition with your doctor before you become pregnant. Your doctor may switch the type of medication you use during pregnancy. Very small amounts of this medication may pass into breast milk but is unlikely to harm a nursing infant. Consult your doctor before breast-feeding.      DRUG INTERACTIONS:  Drug interactions may change how your medications work or increase your risk for serious side effects. This document does not contain all possible drug interactions. Keep a list of all the products you use (including prescription/nonprescription drugs and herbal products) and share it with your doctor and pharmacist. Do not start, stop, or change the dosage of any medicines without your doctor's approval. Warfarin interacts with many prescription, nonprescription, vitamin, and herbal products. This includes medications that are applied to the skin or inside the vagina or rectum. The interactions with warfarin usually result in an increase or decrease in the \"blood-thinning\" (anticoagulant) effect. Your doctor or other health care professional should closely monitor you to prevent serious bleeding or clotting problems. While taking warfarin, it is very important to tell your doctor or pharmacist of any changes in medications, vitamins, or herbal products that you are taking. Some products that may interact with this drug include: " capecitabine, imatinib, mifepristone. Aspirin, aspirin-like drugs (salicylates), and nonsteroidal anti-inflammatory drugs (NSAIDs such as ibuprofen, naproxen, celecoxib) may have effects similar to warfarin. These drugs may increase the risk of bleeding problems if taken during treatment with warfarin. Carefully check all prescription/nonprescription product labels (including drugs applied to the skin such as pain-relieving creams) since the products may contain NSAIDs or salicylates. Talk to your doctor about using a different medication (such as acetaminophen) to treat pain/fever. Low-dose aspirin and related drugs (such as clopidogrel, ticlopidine) should be continued if prescribed by your doctor for specific medical reasons such as heart attack or stroke prevention. Consult your doctor or pharmacist for more details. Many herbal products interact with warfarin. Tell your doctor before taking any herbal products, especially bromelains, coenzyme Q10, cranberry, danshen, dong quai, fenugreek, garlic, ginkgo biloba, ginseng, and Mary's wort, among others. This medication may interfere with a certain laboratory test to measure theophylline levels, possibly causing false test results. Make sure laboratory personnel and all your doctors know you use this drug.      OVERDOSE:  If overdose is suspected, contact a poison control center or emergency room immediately. [place] residents can call the [place] National Poison Hotline at 1-589.662.9133. [place] residents can call a provincial poison control center. Symptoms of overdose may include: bloody/black/tarry stools, pink/dark urine, unusual/prolonged bleeding.      NOTES:  Do not share this medication with others. Laboratory and/or medical tests (such as INR, complete blood count) must be performed periodically to monitor your progress or check for side effects. Consult your doctor for more details.      MISSED DOSE:  For the best possible benefit, do not miss any doses. If  you do miss a dose and remember on the same day, take it as soon as you remember. If you remember on the next day, skip the missed dose and resume your usual dosing schedule. Do not double the dose to catch up because this could increase your risk for bleeding. Keep a record of missed doses to give to your doctor or pharmacist. Contact your doctor or pharmacist if you miss 2 or more doses in a row.      STORAGE:  Store at room temperature away from light and moisture. Do not store in the bathroom. Keep all medications away from children and pets. Do not flush medications down the toilet or pour them into a drain unless instructed to do so. Properly discard this product when it is  or no longer needed. Consult your pharmacist or local waste disposal company for more details about how to safely discard your product.      MEDICAL ALERT:  Your condition and medication can cause complications in a medical emergency. For information about enrolling in MedicAlert, call 1-776.925.3296 ([place]) or 1-789.104.2219 ([place]).      Information last revised 2010 Copyright(c) 2010 [employer]         Incision and Drainage  Incision and drainage is a surgical procedure to open and drain a fluid-filled sac. The sac may be filled with pus, mucus, or blood. Examples of fluid-filled sacs that may need surgical drainage include cysts, skin infections (abscesses), and red lumps that develop from a ruptured cyst or a small abscess (boils).  You may need this procedure if the affected area is large, painful, infected, or not healing well.  Tell a health care provider about:  · Any allergies you have.  · All medicines you are taking, including vitamins, herbs, eye drops, creams, and over-the-counter medicines.  · Any problems you or family members have had with anesthetic medicines.  · Any blood disorders you have.  · Any surgeries you have had.  · Any medical conditions you have.  · Whether you are pregnant or may be  pregnant.  What are the risks?  Generally, this is a safe procedure. However, problems may occur, including:  · Infection.  · Bleeding.  · Allergic reactions to medicines.  · Scarring.  What happens before the procedure?  · You may need an ultrasound or other imaging tests to see how large or deep the fluid-filled sac is.  · You may have blood tests to check for infection.  · You may get a tetanus shot.  · You may be given antibiotic medicine to help prevent infection.  · Follow instructions from your health care provider about eating or drinking restrictions.  · Ask your health care provider about:  ¨ Changing or stopping your regular medicines. This is especially important if you are taking diabetes medicines or blood thinners.  ¨ Taking medicines such as aspirin and ibuprofen. These medicines can thin your blood. Do not take these medicines before your procedure if your health care provider instructs you not to.  · Plan to have someone take you home after the procedure.  · If you will be going home right after the procedure, plan to have someone stay with you for 24 hours.  What happens during the procedure?  · To reduce your risk of infection:  ¨ Your health care team will wash or sanitize their hands.  ¨ Your skin will be washed with soap.  · You will be given one or more of the following:  ¨ A medicine to help you relax (sedative).  ¨ A medicine to numb the area (local anesthetic).  ¨ A medicine to make you fall asleep (general anesthetic).  · An incision will be made in the top of the fluid-filled sac.  · The contents of the sac may be squeezed out, or a syringe or tube (catheter) may be used to empty the sac.  · The catheter may be left in place for several weeks to drain any fluid. Or, your health care provider may stitch open the edges of the incision to make a long-term opening for drainage (marsupialization).  · The inside of the sac may be washed out (irrigated) with a sterile solution and packed with  gauze before it is covered with a bandage (dressing).  The procedure may vary among health care providers and hospitals.  What happens after the procedure?  · Your blood pressure, heart rate, breathing rate, and blood oxygen level will be monitored often until the medicines you were given have worn off.  · Do not drive for 24 hours if you received a sedative.  This information is not intended to replace advice given to you by your health care provider. Make sure you discuss any questions you have with your health care provider.  Document Released: 06/13/2002 Document Revised: 05/25/2017 Document Reviewed: 10/07/2016  [employer] Patient Education © 2017 [employer]        Depression / Suicide Risk    As you are discharged from this [facility] facility, it is important to learn how to keep safe from harming yourself.    Recognize the warning signs:  · Abrupt changes in personality, positive or negative- including increase in energy   · Giving away possessions  · Change in eating patterns- significant weight changes-  positive or negative  · Change in sleeping patterns- unable to sleep or sleeping all the time   · Unwillingness or inability to communicate  · Depression  · Unusual sadness, discouragement and loneliness  · Talk of wanting to die  · Neglect of personal appearance   · Rebelliousness- reckless behavior  · Withdrawal from people/activities they love  · Confusion- inability to concentrate     If you or a loved one observes any of these behaviors or has concerns about self-harm, here's what you can do:  · Talk about it- your feelings and reasons for harming yourself  · Remove any means that you might use to hurt yourself (examples: pills, rope, extension cords, firearm)  · Get professional help from the community (Mental Health, Substance Abuse, psychological counseling)  · Do not be alone:Call your Safe Contact- someone whom you trust who will be there for you.  · Call your local CRISIS HOTLINE  380-4808 or 999-422-9835  · Call your local Children's Mobile Crisis Response Team [place] (349) 992-4283 or www.[employer] (formerly [employer])  · Call the toll free National Suicide Prevention Hotlines   · National Suicide Prevention Lifeline 164-002-JAVN (5761)  · National Send the Trend Line Network 800-SUICIDE (125-2129)

## 2019-02-17 LAB
BACTERIA WND AEROBE CULT: ABNORMAL
BACTERIA WND AEROBE CULT: ABNORMAL
GRAM STN SPEC: ABNORMAL
SIGNIFICANT IND 70042: ABNORMAL
SITE SITE: ABNORMAL
SOURCE SOURCE: ABNORMAL

## 2019-02-18 ENCOUNTER — HOME CARE VISIT (OUTPATIENT)
Dept: HOME HEALTH SERVICES | Facility: HOME HEALTHCARE | Age: 79
End: 2019-02-18
Payer: MEDICARE

## 2019-02-18 ENCOUNTER — PATIENT OUTREACH (OUTPATIENT)
Dept: HEALTH INFORMATION MANAGEMENT | Facility: OTHER | Age: 79
End: 2019-02-18

## 2019-02-18 VITALS
RESPIRATION RATE: 14 BRPM | DIASTOLIC BLOOD PRESSURE: 64 MMHG | BODY MASS INDEX: 32.61 KG/M2 | HEIGHT: 64 IN | SYSTOLIC BLOOD PRESSURE: 120 MMHG | TEMPERATURE: 98.4 F | WEIGHT: 191 LBS | OXYGEN SATURATION: 97 % | HEART RATE: 79 BPM

## 2019-02-18 LAB — INR PPP: 1.1 (ref 2–3.5)

## 2019-02-18 PROCEDURE — 665001 SOC-HOME HEALTH

## 2019-02-18 PROCEDURE — G0493 RN CARE EA 15 MIN HH/HOSPICE: HCPCS

## 2019-02-18 SDOH — ECONOMIC STABILITY: HOUSING INSECURITY: HOME SAFETY: PT HAS AN O2 CONCENTRATOR AT HOME, AND REPORTS NOT USING IT SINCE PACEMAKER PLACEMENT.

## 2019-02-18 ASSESSMENT — ENCOUNTER SYMPTOMS
NAUSEA: DENIES
DEPRESSED MOOD: 1
VOMITING: DENIES

## 2019-02-18 ASSESSMENT — ACTIVITIES OF DAILY LIVING (ADL): OASIS_M1830: 02

## 2019-02-18 ASSESSMENT — PATIENT HEALTH QUESTIONNAIRE - PHQ9
CLINICAL INTERPRETATION OF PHQ2 SCORE: 0
2. FEELING DOWN, DEPRESSED, IRRITABLE, OR HOPELESS: 00
1. LITTLE INTEREST OR PLEASURE IN DOING THINGS: 00

## 2019-02-19 ENCOUNTER — HOME CARE VISIT (OUTPATIENT)
Dept: HOME HEALTH SERVICES | Facility: HOME HEALTHCARE | Age: 79
End: 2019-02-19
Payer: MEDICARE

## 2019-02-19 ENCOUNTER — TELEPHONE (OUTPATIENT)
Dept: HEALTH INFORMATION MANAGEMENT | Facility: OTHER | Age: 79
End: 2019-02-19

## 2019-02-19 ENCOUNTER — ANTICOAGULATION MONITORING (OUTPATIENT)
Dept: VASCULAR LAB | Facility: MEDICAL CENTER | Age: 79
End: 2019-02-19

## 2019-02-19 ENCOUNTER — PATIENT OUTREACH (OUTPATIENT)
Dept: HEALTH INFORMATION MANAGEMENT | Facility: OTHER | Age: 79
End: 2019-02-19

## 2019-02-19 DIAGNOSIS — Z79.01 CHRONIC ANTICOAGULATION: ICD-10-CM

## 2019-02-19 LAB
BACTERIA SPEC ANAEROBE CULT: NORMAL
SIGNIFICANT IND 70042: NORMAL
SITE SITE: NORMAL
SOURCE SOURCE: NORMAL

## 2019-02-19 NOTE — TELEPHONE ENCOUNTER
Received referral from [facility]. Medications reviewed. Interaction noted between spironolactone and lisinopril for increased risk of hyperkalemia. Patient's last potassium level on 2/15/19 was 4.2 which is WNL. Continue to monitor. Patient's warfarin dosing is being managed by [facility] anticoagulation services.     Mar Mas, PharmD

## 2019-02-19 NOTE — PROGRESS NOTES
Anticoagulation Summary  As of 2019    INR goal:   2.0-3.0   TTR:   72.9 % (3.6 y)   INR used for dosin.1! (2019)   Warfarin maintenance plan:   3.75 mg (2.5 mg x 1.5) every Mon, Thu; 2.5 mg (2.5 mg x 1) all other days   Weekly warfarin total:   20 mg   Plan last modified:   Massiel Coleman (2019)   Next INR check:   2019   Target end date:   Indefinite    Indications    Atrial fibrillation (HCC) (Resolved) [I48.91]  Chronic anticoagulation [Z79.01]             Anticoagulation Episode Summary     INR check location:   Home Draw    Preferred lab:       Send INR reminders to:       Comments:   Winston YEAGER      Anticoagulation Care Providers     Provider Role Specialty Phone number    Lizzy Haywood M.D. Referring Cardiology [phone]    [facility] Responsible  [phone]    Vladimir Taylor, PharmD Responsible          Anticoagulation Patient Findings  Patient Findings     Negatives:   Signs/symptoms of thrombosis, Signs/symptoms of bleeding, Laboratory test error suspected, Change in health, Change in alcohol use, Change in activity, Upcoming invasive procedure, Emergency department visit, Upcoming dental procedure, Missed doses, Extra doses, Change in medications, Change in diet/appetite, Hospital admission, Bruising, Other complaints        Spoke with Donte Alicea to report a sub therapeutic INR of 1.1. Confirmed no missed doses.  Medications reviewed.  Will bolus dose with 5mg po qhs x2 then resume current dosing regimen. Follow up in 3 days, to reduce the risk of adverse events related to this high risk medication, warfarin.    Massiel Coleman, Clinical Pharmacist

## 2019-02-20 ENCOUNTER — PATIENT OUTREACH (OUTPATIENT)
Dept: HEALTH INFORMATION MANAGEMENT | Facility: OTHER | Age: 79
End: 2019-02-20

## 2019-02-21 ENCOUNTER — TELEPHONE (OUTPATIENT)
Dept: MEDICAL GROUP | Facility: PHYSICIAN GROUP | Age: 79
End: 2019-02-21

## 2019-02-21 NOTE — TELEPHONE ENCOUNTER
VOICEMAIL  1. Caller Name: Donte Magaña                      Call Back Number: [phone] (home)     2. Message: Pt stated she needs an order of INR test strips from [facility] to monitor  Her Warfarin. Pt also stated hse has had 2 surgeries in the last month.    3. Patient approves office to leave a detailed voicemail/MyChart message: N\A    Lele I didn't see a previous Rx for this or Pharmacy on file for [facility].  Please advise

## 2019-02-21 NOTE — CONSULTS
DATE OF SERVICE:  02/14/2019    ORTHOPEDIC CONSULTATION    CHIEF COMPLAINT:  Right hip pain.    HISTORY OF PRESENT ILLNESS:  The patient is an 78-year-old female well known   to my partner, Dr. Juan C Mercedes for 1 month status post anterior total hip   arthroplasty.  She was doing 2-week postoperative visit when she saw as an   outpatient.  Over the last 2 weeks, she developed worsening pain.  She is on   Coumadin for atrial fibrillation.  She also noticed some drainage from   proximal mid incision.  She came to [facility].  CT scan   was ordered and was found to have a hematoma.  I was consulted the orthopedic   surgeon on call after this was ordered.  She was admitted to the hospitalist.    Upon seeing the patient, rates her pain approximately 8/10 in severity.  It   is aching pain, throbbing in her thigh with some pain with ambulation,   somewhat amenable to bed rest, ice, and pain medication.    PAST MEDICAL HISTORY:  Atrial fibrillation, arthritis, asthma, back pain,   bronchitis, coronary artery disease, depression, glaucoma,   hypercholesterolemia, history of pacemaker, pneumonia, obesity, mitral   regurgitation, and spinal stenosis.    PAST SURGICAL HISTORY:  One month status post right total hip arthroplasty,   adenoidectomy, tonsillectomy, colonoscopy, hysterectomy, cataract surgery,   pacemaker placement, right total knee arthroplasty.    MEDICATIONS:  Polypharmacy, please see chart.    ALLERGIES:  AMIODARONE, BACTRIM, CIPRO, METOPROLOL, MORPHINE, VITOPLEX,   VIBRAMYCIN, ATORVASTATIN, AUGMENTIN, DILTIAZEM, FLECAINIDE, KEFLEX, MUCINEX,   TRAMADOL, AND TAPE.    REVIEW OF SYSTEMS:  Thorough review of systems is performed and is negative   except for past medical history and history of present illness.    SOCIAL HISTORY:  She lives locally.  She denies tobacco, alcohol or drug use.    FAMILY HISTORY:  Negative for bleeding disorders or anesthetic reactions.    OBJECTIVE:  GENERAL:  She is  alert and oriented x4.  HEENT:  Normocephalic, atraumatic.  NEUROLOGIC:  Cranial nerves II-XII are grossly intact.  CARDIOVASCULAR:  2+ distal pulses.  EXTREMITIES:  No cyanosis, clubbing, or edema.  PULMONARY:  Breathing comfortably on room air without labor.  ABDOMEN:  Obese, otherwise unremarkable.  SKIN:  No rashes, jaundice, or cyanosis.  SPINE:  Clinically midline.  GAIT:  Currently nonambulatory in a hospital bed.  MUSCULOSKELETAL:  Focused examination of right hip; anterior hip incision is   well healed, some slight maceration, slight scant drainage, serosanguineous   fluid in the dressing under her pannus proximally and distally she has some   induration and tender to palpation consistent with hematoma.  Thigh is soft.  NEUROLOGIC:  Right lower extremity, sensation intact to light touch, L4, L5,   S1 dermatomes.  IMAGING STUDIES:  CT scan of the hip demonstrates prosthesis appears to be   well aligned without evidence of loosening.  There is approximately 12 cm   superficial hematoma in the mid thigh.    ASSESSMENT:  An 78-year-old female 1 month status post right total hip   arthroplasty with hematoma and some drainage.    PLAN:  I had a thorough discussion with the patient regarding diagnosis.  She   understands and recommendation for admission to hospitalist, will discuss her   case with Dr. Juan C Mercedes, discussed conservative versus operative   treatment.  The patient understands this plan and wishes to proceed.       ____________________________________     MD GLADYS Hoff / DELIO    DD:  02/20/2019 18:02:19  DT:  02/20/2019 20:25:41    D#:  [id number]  Job#:  [id number]

## 2019-02-22 ENCOUNTER — ANTICOAGULATION MONITORING (OUTPATIENT)
Dept: VASCULAR LAB | Facility: MEDICAL CENTER | Age: 79
End: 2019-02-22

## 2019-02-22 DIAGNOSIS — Z79.01 CHRONIC ANTICOAGULATION: ICD-10-CM

## 2019-02-22 LAB — INR PPP: 2 (ref 2–3.5)

## 2019-02-22 NOTE — PROGRESS NOTES
Anticoagulation Summary  As of 2019    INR goal:   2.0-3.0   TTR:   72.7 % (3.6 y)   INR used for dosin.0 (2019)   Warfarin maintenance plan:   3.75 mg (2.5 mg x 1.5) every Mon, Thu; 2.5 mg (2.5 mg x 1) all other days   Weekly warfarin total:   20 mg   Plan last modified:   Massiel DIAZ Filter (2019)   Next INR check:   2019   Target end date:   Indefinite    Indications    Atrial fibrillation (HCC) (Resolved) [I48.91]  Chronic anticoagulation [Z79.01]             Anticoagulation Episode Summary     INR check location:   Home Draw    Preferred lab:       Send INR reminders to:       Comments:   Winston YEAGER      Anticoagulation Care Providers     Provider Role Specialty Phone number    Lizzy Haywood M.D. Referring Cardiology [phone]    [facility]ticoagulation [facility] Responsible  [phone]    Vladimir Taylor, PharmD Responsible          Anticoagulation Patient Findings    Spoke with patient this morning to report a therapeutic INR of 2.0.      Patient instructed to continue with current warfarin dosing regimen, confirms dosing.     Patient denies any s/s of bleeding, bruising, clotting or any changes to diet or medication.      Will follow up in <1 week(s) on      Dmitry Carson, Pharmacy Intern

## 2019-02-23 ENCOUNTER — HOME CARE VISIT (OUTPATIENT)
Dept: HOME HEALTH SERVICES | Facility: HOME HEALTHCARE | Age: 79
End: 2019-02-23
Payer: MEDICARE

## 2019-02-23 DIAGNOSIS — I48.19 PERSISTENT ATRIAL FIBRILLATION (HCC): ICD-10-CM

## 2019-02-23 PROCEDURE — G0299 HHS/HOSPICE OF RN EA 15 MIN: HCPCS

## 2019-02-24 VITALS
TEMPERATURE: 96.9 F | DIASTOLIC BLOOD PRESSURE: 60 MMHG | SYSTOLIC BLOOD PRESSURE: 100 MMHG | RESPIRATION RATE: 17 BRPM | HEART RATE: 80 BPM | OXYGEN SATURATION: 98 %

## 2019-02-25 ENCOUNTER — HOME CARE VISIT (OUTPATIENT)
Dept: HOME HEALTH SERVICES | Facility: HOME HEALTHCARE | Age: 79
End: 2019-02-25
Payer: MEDICARE

## 2019-02-25 VITALS
SYSTOLIC BLOOD PRESSURE: 118 MMHG | RESPIRATION RATE: 16 BRPM | DIASTOLIC BLOOD PRESSURE: 60 MMHG | TEMPERATURE: 97.2 F | HEART RATE: 82 BPM | OXYGEN SATURATION: 97 %

## 2019-02-25 PROCEDURE — G0299 HHS/HOSPICE OF RN EA 15 MIN: HCPCS

## 2019-02-25 ASSESSMENT — ENCOUNTER SYMPTOMS
VOMITING: PT DENIES ANY EMESIS AT THIS TIME
NAUSEA: PT DENIES ANY NAUSEA AT THIS TIME

## 2019-02-25 NOTE — TELEPHONE ENCOUNTER
Phone Number Called: [phone] (home)       Message: spoke with pt's .  Pt needs to contact the anticoagulation clinic for her supplies they are monitoring her coumadin.  Pt to schedule with Dr if she needs to go over her surgeries with him.     Pt agrees.       Left Message for patient to call back: N\A

## 2019-02-27 ENCOUNTER — HOME CARE VISIT (OUTPATIENT)
Dept: HOME HEALTH SERVICES | Facility: HOME HEALTHCARE | Age: 79
End: 2019-02-27
Payer: MEDICARE

## 2019-03-01 ENCOUNTER — HOME CARE VISIT (OUTPATIENT)
Dept: HOME HEALTH SERVICES | Facility: HOME HEALTHCARE | Age: 79
End: 2019-03-01
Payer: MEDICARE

## 2019-03-01 VITALS
HEART RATE: 84 BPM | RESPIRATION RATE: 16 BRPM | OXYGEN SATURATION: 95 % | SYSTOLIC BLOOD PRESSURE: 149 MMHG | TEMPERATURE: 98.4 F | DIASTOLIC BLOOD PRESSURE: 72 MMHG

## 2019-03-01 PROCEDURE — G0151 HHCP-SERV OF PT,EA 15 MIN: HCPCS

## 2019-03-01 ASSESSMENT — ACTIVITIES OF DAILY LIVING (ADL)
EATING_ASSISTANCE: 0
TOILETING_ASSISTANCE: 0
GROOMING_ASSISTANCE: 0
ORAL_CARE_ASSISTANCE: 0
MEAL_PREP_ASSISTANCE: 5
LAUNDRY_ASSISTANCE: 6
HOUSEKEEPING_ASSISTANCE: 6
TELEPHONE_ASSISTANCE: 5
SHOPPING_ASSISTANCE: 5
TRANSPORTATION_ASSISTANCE: 6

## 2019-03-04 ENCOUNTER — HOME CARE VISIT (OUTPATIENT)
Dept: HOME HEALTH SERVICES | Facility: HOME HEALTHCARE | Age: 79
End: 2019-03-04
Payer: MEDICARE

## 2019-03-04 VITALS
DIASTOLIC BLOOD PRESSURE: 62 MMHG | SYSTOLIC BLOOD PRESSURE: 122 MMHG | TEMPERATURE: 97.7 F | HEART RATE: 81 BPM | RESPIRATION RATE: 16 BRPM

## 2019-03-04 DIAGNOSIS — B35.9 TINEA: ICD-10-CM

## 2019-03-04 PROCEDURE — G0299 HHS/HOSPICE OF RN EA 15 MIN: HCPCS

## 2019-03-04 PROCEDURE — G0151 HHCP-SERV OF PT,EA 15 MIN: HCPCS

## 2019-03-04 RX ORDER — NYSTATIN 100000 [USP'U]/G
POWDER TOPICAL
Qty: 30 G | Refills: 1 | Status: SHIPPED | OUTPATIENT
Start: 2019-03-04 | End: 2020-03-30

## 2019-03-04 SDOH — ECONOMIC STABILITY: HOUSING INSECURITY
HOME SAFETY: OXYGEN SAFETY RISK ASSESSMENT PERFORMED. PATIENT DOES HAVE A NO SMOKING SIGN POSTED IN THE HOME. PATIENT DOES HAVE A WORKING FIRE EXTINGUISHER PRESENT IN THE HOME. SMOKE ALARMS ARE PRESENT AND FUNCTIONAL ON EACH LEVEL OF THE HOME. PATIENT DOES HAVE A

## 2019-03-04 SDOH — ECONOMIC STABILITY: HOUSING INSECURITY: EVIDENCE OF SMOKING MATERIAL: 0

## 2019-03-04 SDOH — ECONOMIC STABILITY: HOUSING INSECURITY
HOME SAFETY: FIRE ESCAPE PLAN DEVELOPED. PATIENT DOES NOT HAVE FLAMMABLE MATERIALS PRESENT IN THE HOME PRESENTING A FIRE HAZARD. NO EVIDENCE FOUND OF SMOKING MATERIALS PRESENT IN THE HOME.  PT HAS HOME OXYGEN BUT STATES SHE DOESN'T WEAR IT, PT HAS GOOD O2 SATS

## 2019-03-04 ASSESSMENT — ENCOUNTER SYMPTOMS
SHORTNESS OF BREATH: T
VOMITING: PT DENIES ANY EMESIS AT THIS TIME
NAUSEA: PT DENIES ANY NAUSEA AT THIS TIME

## 2019-03-05 ENCOUNTER — ANTICOAGULATION MONITORING (OUTPATIENT)
Dept: VASCULAR LAB | Facility: MEDICAL CENTER | Age: 79
End: 2019-03-05

## 2019-03-05 VITALS
TEMPERATURE: 97.7 F | SYSTOLIC BLOOD PRESSURE: 122 MMHG | HEART RATE: 81 BPM | RESPIRATION RATE: 16 BRPM | DIASTOLIC BLOOD PRESSURE: 62 MMHG | OXYGEN SATURATION: 98 %

## 2019-03-05 DIAGNOSIS — Z79.01 CHRONIC ANTICOAGULATION: ICD-10-CM

## 2019-03-05 LAB — INR PPP: 2.5 (ref 2–3.5)

## 2019-03-05 NOTE — PROGRESS NOTES
Anticoagulation Summary  As of 3/5/2019    INR goal:   2.0-3.0   TTR:   72.9 % (3.6 y)   INR used for dosin.5 (3/5/2019)   Warfarin maintenance plan:   3.75 mg (2.5 mg x 1.5) every Mon, Thu; 2.5 mg (2.5 mg x 1) all other days   Weekly warfarin total:   20 mg   No change documented:   Sameer Guzman, Med Ass't   Plan last modified:   Massiel M Filter (2019)   Next INR check:   3/19/2019   Target end date:   Indefinite    Indications    Atrial fibrillation (HCC) (Resolved) [I48.91]  Chronic anticoagulation [Z79.01]             Anticoagulation Episode Summary     INR check location:   Home Draw    Preferred lab:       Send INR reminders to:       Comments:   Winston YEAGER      Anticoagulation Care Providers     Provider Role Specialty Phone number    Lizzy Haywood M.D. Referring Cardiology [phone]    [facility] Anticoagulation Services Responsible  [phone]    Vladimir Taylor, PharmD Responsible          Anticoagulation Patient Findings  Patient Findings     Negatives:   Signs/symptoms of thrombosis, Signs/symptoms of bleeding, Laboratory test error suspected, Change in health, Change in alcohol use, Change in activity, Upcoming invasive procedure, Emergency department visit, Upcoming dental procedure, Missed doses, Extra doses, Change in medications, Change in diet/appetite, Hospital admission, Bruising, Other complaints        Spoke with patient to report a therapeutic INR.  Pt instructed to continue with current warfarin dosing regimen. Pt denies any s/s of bleeding, bruising, clotting or any changes to diet or medication.  Will follow up in 2 weeks.    Sameer Guzman, Med Ass't    I have reviewed and am in agreement with the above stated plan on 3-5-19.  Vladimir Taylor, PharmD

## 2019-03-06 ENCOUNTER — HOME CARE VISIT (OUTPATIENT)
Dept: HOME HEALTH SERVICES | Facility: HOME HEALTHCARE | Age: 79
End: 2019-03-06
Payer: MEDICARE

## 2019-03-06 VITALS
OXYGEN SATURATION: 99 % | DIASTOLIC BLOOD PRESSURE: 68 MMHG | RESPIRATION RATE: 16 BRPM | SYSTOLIC BLOOD PRESSURE: 140 MMHG | TEMPERATURE: 97.8 F | HEART RATE: 82 BPM

## 2019-03-06 PROCEDURE — G0299 HHS/HOSPICE OF RN EA 15 MIN: HCPCS

## 2019-03-06 SDOH — ECONOMIC STABILITY: HOUSING INSECURITY
HOME SAFETY: FIRE ESCAPE PLAN DEVELOPED. PATIENT DOES NOT HAVE FLAMMABLE MATERIALS PRESENT IN THE HOME PRESENTING A FIRE HAZARD. NO EVIDENCE FOUND OF SMOKING MATERIALS PRESENT IN THE HOME.    PT HAS OXYGEN IN THE HOME , BUT STATES SHE DOESN'T WEAR IT

## 2019-03-06 SDOH — ECONOMIC STABILITY: HOUSING INSECURITY: EVIDENCE OF SMOKING MATERIAL: 0

## 2019-03-06 ASSESSMENT — ENCOUNTER SYMPTOMS
SHORTNESS OF BREATH: T
VOMITING: PT DENIES ANY EMESIS AT THIS TIME

## 2019-03-10 PROCEDURE — G0180 MD CERTIFICATION HHA PATIENT: HCPCS | Performed by: FAMILY MEDICINE

## 2019-03-11 ENCOUNTER — HOME CARE VISIT (OUTPATIENT)
Dept: HOME HEALTH SERVICES | Facility: HOME HEALTHCARE | Age: 79
End: 2019-03-11
Payer: MEDICARE

## 2019-03-11 VITALS
OXYGEN SATURATION: 96 % | DIASTOLIC BLOOD PRESSURE: 70 MMHG | SYSTOLIC BLOOD PRESSURE: 138 MMHG | HEART RATE: 87 BPM | TEMPERATURE: 97 F | RESPIRATION RATE: 16 BRPM

## 2019-03-11 PROCEDURE — G0299 HHS/HOSPICE OF RN EA 15 MIN: HCPCS

## 2019-03-11 SDOH — ECONOMIC STABILITY: HOUSING INSECURITY: EVIDENCE OF SMOKING MATERIAL: 0

## 2019-03-11 SDOH — ECONOMIC STABILITY: HOUSING INSECURITY
HOME SAFETY: FIRE ESCAPE PLAN DEVELOPED. PATIENT DOES NOT HAVE FLAMMABLE MATERIALS PRESENT IN THE HOME PRESENTING A FIRE HAZARD. NO EVIDENCE FOUND OF SMOKING MATERIALS PRESENT IN THE HOME.

## 2019-03-11 ASSESSMENT — ENCOUNTER SYMPTOMS
NAUSEA: PT DENIES ANY NAUSEA AT THIS TIME
VOMITING: PT DENIES ANY EMESIS AT THIS TIME

## 2019-03-12 DIAGNOSIS — R60.9 PERIPHERAL EDEMA: ICD-10-CM

## 2019-03-12 RX ORDER — FUROSEMIDE 40 MG/1
40 TABLET ORAL DAILY
Qty: 90 TAB | Refills: 3
Start: 2019-03-12 | End: 2019-07-24

## 2019-03-13 ENCOUNTER — HOME CARE VISIT (OUTPATIENT)
Dept: HOME HEALTH SERVICES | Facility: HOME HEALTHCARE | Age: 79
End: 2019-03-13
Payer: MEDICARE

## 2019-03-13 ENCOUNTER — TELEPHONE (OUTPATIENT)
Dept: MEDICAL GROUP | Facility: PHYSICIAN GROUP | Age: 79
End: 2019-03-13

## 2019-03-13 VITALS
OXYGEN SATURATION: 97 % | SYSTOLIC BLOOD PRESSURE: 124 MMHG | DIASTOLIC BLOOD PRESSURE: 64 MMHG | HEART RATE: 80 BPM | TEMPERATURE: 97.8 F | RESPIRATION RATE: 16 BRPM

## 2019-03-13 DIAGNOSIS — M48.00 SPINAL STENOSIS, MULTILEVEL: ICD-10-CM

## 2019-03-13 PROCEDURE — G0151 HHCP-SERV OF PT,EA 15 MIN: HCPCS

## 2019-03-13 ASSESSMENT — ENCOUNTER SYMPTOMS: DEBILITATING PAIN: 1

## 2019-03-13 NOTE — TELEPHONE ENCOUNTER
2. SPECIFIC Action To Be Taken: Referral needed    3. Diagnosis/Clinical Reason for Request: Pain Management    4. Specialty & Provider Name/Lab/Imaging Location: [facility]    5. Is appointment scheduled for requested order/referral: no    Patient was not informed they will receive a return phone call from the office ONLY if there are any questions before processing their request. Advised to call back if they haven't received a call from the referral department in 5 days.

## 2019-03-13 NOTE — TELEPHONE ENCOUNTER
Pt has been informed of referral submitted and to allow 7-10 business day to process. Via [facility]

## 2019-03-18 ENCOUNTER — HOME CARE VISIT (OUTPATIENT)
Dept: HOME HEALTH SERVICES | Facility: HOME HEALTHCARE | Age: 79
End: 2019-03-18
Payer: MEDICARE

## 2019-03-18 VITALS
HEART RATE: 84 BPM | DIASTOLIC BLOOD PRESSURE: 64 MMHG | RESPIRATION RATE: 16 BRPM | TEMPERATURE: 97.6 F | SYSTOLIC BLOOD PRESSURE: 134 MMHG | OXYGEN SATURATION: 98 %

## 2019-03-18 PROCEDURE — G0299 HHS/HOSPICE OF RN EA 15 MIN: HCPCS

## 2019-03-18 SDOH — ECONOMIC STABILITY: HOUSING INSECURITY
HOME SAFETY: FIRE ESCAPE PLAN DEVELOPED. PATIENT DOES NOT HAVE FLAMMABLE MATERIALS PRESENT IN THE HOME PRESENTING A FIRE HAZARD. NO EVIDENCE FOUND OF SMOKING MATERIALS PRESENT IN THE HOME.  PT DOES NOT WEAR O2 BUT HAS CONCENTRATOR IN HOME

## 2019-03-18 SDOH — ECONOMIC STABILITY: HOUSING INSECURITY: EVIDENCE OF SMOKING MATERIAL: 0

## 2019-03-18 ASSESSMENT — ENCOUNTER SYMPTOMS
VOMITING: PT DENIES ANY EMESIS AT THIS TIME
NAUSEA: PT DENIES ANY NAUSEA AT THIS TIME

## 2019-03-19 ENCOUNTER — ANTICOAGULATION MONITORING (OUTPATIENT)
Dept: VASCULAR LAB | Facility: MEDICAL CENTER | Age: 79
End: 2019-03-19

## 2019-03-19 ENCOUNTER — HOME CARE VISIT (OUTPATIENT)
Dept: HOME HEALTH SERVICES | Facility: HOME HEALTHCARE | Age: 79
End: 2019-03-19
Payer: MEDICARE

## 2019-03-19 VITALS
RESPIRATION RATE: 17 BRPM | DIASTOLIC BLOOD PRESSURE: 66 MMHG | SYSTOLIC BLOOD PRESSURE: 124 MMHG | OXYGEN SATURATION: 94 % | TEMPERATURE: 98.4 F | HEART RATE: 84 BPM

## 2019-03-19 DIAGNOSIS — Z79.01 CHRONIC ANTICOAGULATION: ICD-10-CM

## 2019-03-19 LAB — INR PPP: 2.6 (ref 2–3.5)

## 2019-03-19 PROCEDURE — G0151 HHCP-SERV OF PT,EA 15 MIN: HCPCS

## 2019-03-19 ASSESSMENT — ENCOUNTER SYMPTOMS: DEBILITATING PAIN: 1

## 2019-03-19 NOTE — PROGRESS NOTES
OP Telephone Anticoagulation Service Note    Date: 3/19/2019      Anticoagulation Summary  As of 3/19/2019    INR goal:   2.0-3.0   TTR:   73.2 % (3.7 y)   INR used for dosin.6 (3/19/2019)   Warfarin maintenance plan:   3.75 mg (2.5 mg x 1.5) every Mon, Thu; 2.5 mg (2.5 mg x 1) all other days   Weekly warfarin total:   20 mg   Plan last modified:   Massiel DIAZ Filter (2019)   Next INR check:   2019   Target end date:   Indefinite    Indications    Atrial fibrillation (HCC) (Resolved) [I48.91]  Chronic anticoagulation [Z79.01]             Anticoagulation Episode Summary     INR check location:   Home Draw    Preferred lab:       Send INR reminders to:       Comments:   Winston YEAGER      Anticoagulation Care Providers     Provider Role Specialty Phone number    Lizzy Haywood M.D. Referring Cardiology [phone]    [facility] Anticoagulation Services Responsible  [phone]    Vladimir Taylor, PharmD Responsible          Anticoagulation Patient Findings        Plan: Spoke with patient on the phone. Patient is  therapeutic today. Confirmed dosing. No missed tablets in the last week. Patient denies any changes in medications or diet. Patient denies any signs or symptoms of bleeding or clotting. Instructed patient to call clinic if any unusual bleeding or bruising occurs. Will continue dosing as outlined. Will follow-up with patient in 2 week(s).          Ivone Barraza, PharmD

## 2019-03-25 ENCOUNTER — PATIENT OUTREACH (OUTPATIENT)
Dept: HEALTH INFORMATION MANAGEMENT | Facility: OTHER | Age: 79
End: 2019-03-25

## 2019-03-25 ENCOUNTER — HOME CARE VISIT (OUTPATIENT)
Dept: HOME HEALTH SERVICES | Facility: HOME HEALTHCARE | Age: 79
End: 2019-03-25
Payer: MEDICARE

## 2019-03-25 VITALS
DIASTOLIC BLOOD PRESSURE: 58 MMHG | RESPIRATION RATE: 16 BRPM | HEART RATE: 84 BPM | TEMPERATURE: 98 F | OXYGEN SATURATION: 97 % | SYSTOLIC BLOOD PRESSURE: 126 MMHG

## 2019-03-25 DIAGNOSIS — I10 ESSENTIAL HYPERTENSION: Primary | ICD-10-CM

## 2019-03-25 PROCEDURE — G0299 HHS/HOSPICE OF RN EA 15 MIN: HCPCS

## 2019-03-25 SDOH — ECONOMIC STABILITY: HOUSING INSECURITY: EVIDENCE OF SMOKING MATERIAL: 0

## 2019-03-25 ASSESSMENT — ENCOUNTER SYMPTOMS
NAUSEA: PT DENIES ANY NAUSEA AT THIS TIME
VOMITING: PT DENIES ANY EMESIS AT THIS TIME
SHORTNESS OF BREATH: T

## 2019-03-25 NOTE — PROGRESS NOTES
Donte Magaña was admitted on 2/13/19 for irrigation & debridement of hip. Once treated she was discharged home on 2/15/19. [facility] patient advocate assisted with multiple discharge needs including 2 appointments, 1 Surgeon appointment with Dr. Juan C Mercedes @ [facility], 1 Urology appointment with Dr. Samir Muse @ [facility]. Of the 2 appointments the patient kept 2. Patient is also scheduled for 2 appointments, Pacer Check on 5/3, and Cardiology on 5/3. [facility] also assisted with discharge orders for [facility] which the patient is currently utilizing 2 times a week and states is going well. PPS Screening was conducted and patient scored at a 70%.

## 2019-03-26 RX ORDER — ATENOLOL 50 MG/1
50 TABLET ORAL 2 TIMES DAILY
Qty: 180 TAB | Refills: 3 | Status: SHIPPED | OUTPATIENT
Start: 2019-03-26 | End: 2020-05-04 | Stop reason: SDUPTHER

## 2019-03-29 ENCOUNTER — HOME CARE VISIT (OUTPATIENT)
Dept: HOME HEALTH SERVICES | Facility: HOME HEALTHCARE | Age: 79
End: 2019-03-29
Payer: MEDICARE

## 2019-03-29 VITALS
DIASTOLIC BLOOD PRESSURE: 64 MMHG | OXYGEN SATURATION: 98 % | RESPIRATION RATE: 17 BRPM | TEMPERATURE: 97.8 F | HEART RATE: 80 BPM | SYSTOLIC BLOOD PRESSURE: 118 MMHG

## 2019-03-29 PROCEDURE — G0151 HHCP-SERV OF PT,EA 15 MIN: HCPCS

## 2019-03-29 ASSESSMENT — ACTIVITIES OF DAILY LIVING (ADL)
SHOPPING_ASSISTANCE: 6
GROOMING_ASSISTANCE: 0
TOILETING_ASSISTANCE: 0
HOUSEKEEPING_ASSISTANCE: 6
BATHING_ASSISTANCE: 1
DRESSING_UB_ASSISTANCE: 0
TRANSPORTATION_ASSISTANCE: 6
ORAL_CARE_ASSISTANCE: 0
LAUNDRY_ASSISTANCE: 6
MEAL_PREP_ASSISTANCE: 6
TELEPHONE_ASSISTANCE: 0
EATING_ASSISTANCE: 0
DRESSING_LB_ASSISTANCE: 0

## 2019-03-29 ASSESSMENT — ENCOUNTER SYMPTOMS: DEBILITATING PAIN: 1

## 2019-03-30 DIAGNOSIS — I10 ESSENTIAL HYPERTENSION: ICD-10-CM

## 2019-04-01 ENCOUNTER — HOME CARE VISIT (OUTPATIENT)
Dept: HOME HEALTH SERVICES | Facility: HOME HEALTHCARE | Age: 79
End: 2019-04-01
Payer: MEDICARE

## 2019-04-01 VITALS
SYSTOLIC BLOOD PRESSURE: 122 MMHG | RESPIRATION RATE: 16 BRPM | TEMPERATURE: 97.7 F | OXYGEN SATURATION: 96 % | DIASTOLIC BLOOD PRESSURE: 68 MMHG | HEART RATE: 64 BPM

## 2019-04-01 PROCEDURE — G0493 RN CARE EA 15 MIN HH/HOSPICE: HCPCS

## 2019-04-01 SDOH — ECONOMIC STABILITY: HOUSING INSECURITY: HOME SAFETY: PT HAS OLD CONCENTRATOR IN THE HOME, BUT DOESN'T USE IT

## 2019-04-01 ASSESSMENT — ACTIVITIES OF DAILY LIVING (ADL)
HOME_HEALTH_OASIS: 01
OASIS_M1830: 01

## 2019-04-01 ASSESSMENT — PATIENT HEALTH QUESTIONNAIRE - PHQ9: CLINICAL INTERPRETATION OF PHQ2 SCORE: 0

## 2019-04-02 ENCOUNTER — ANTICOAGULATION MONITORING (OUTPATIENT)
Dept: VASCULAR LAB | Facility: MEDICAL CENTER | Age: 79
End: 2019-04-02

## 2019-04-02 DIAGNOSIS — Z79.01 CHRONIC ANTICOAGULATION: ICD-10-CM

## 2019-04-02 LAB — INR PPP: 2.2 (ref 2–3.5)

## 2019-04-02 RX ORDER — LISINOPRIL 5 MG/1
TABLET ORAL
Qty: 90 TAB | Refills: 2 | Status: SHIPPED | OUTPATIENT
Start: 2019-04-02 | End: 2019-04-03 | Stop reason: SDUPTHER

## 2019-04-02 NOTE — PROGRESS NOTES
Anticoagulation Summary  As of 2019    INR goal:   2.0-3.0   TTR:   73.5 % (3.7 y)   INR used for dosin.2 (2019)   Warfarin maintenance plan:   3.75 mg (2.5 mg x 1.5) every Mon, Thu; 2.5 mg (2.5 mg x 1) all other days   Weekly warfarin total:   20 mg   Plan last modified:   Massiel DIAZ Filter (2019)   Next INR check:   2019   Target end date:   Indefinite    Indications    Atrial fibrillation (HCC) (Resolved) [I48.91]  Chronic anticoagulation [Z79.01]             Anticoagulation Episode Summary     INR check location:   Home Draw    Preferred lab:       Send INR reminders to:       Comments:   Winston YEAGER      Anticoagulation Care Providers     Provider Role Specialty Phone number    Lizzy Haywood M.D. Referring Cardiology [phone]    [facility] Anticoagulation Services Responsible  [phone]    Vladimir Taylor, PharmD Responsible          Anticoagulation Patient Findings        Spoke to patient on the phone.   INR  therapeutic.   Denies signs/symptoms of bleeding and/or thrombosis.   Denies changes to diet or medications.   Follow up appointment in 2 week(s).    Continue weekly warfarin dose as noted    Fran Leyva, PharmD

## 2019-04-03 DIAGNOSIS — I10 ESSENTIAL HYPERTENSION: ICD-10-CM

## 2019-04-03 RX ORDER — LISINOPRIL 5 MG/1
5 TABLET ORAL
Qty: 90 TAB | Refills: 2 | Status: SHIPPED | OUTPATIENT
Start: 2019-04-03 | End: 2019-04-25 | Stop reason: SDUPTHER

## 2019-04-15 ENCOUNTER — APPOINTMENT (OUTPATIENT)
Dept: VASCULAR LAB | Facility: MEDICAL CENTER | Age: 79
End: 2019-04-15
Payer: MEDICARE

## 2019-04-16 ENCOUNTER — ANTICOAGULATION MONITORING (OUTPATIENT)
Dept: VASCULAR LAB | Facility: MEDICAL CENTER | Age: 79
End: 2019-04-16

## 2019-04-16 DIAGNOSIS — Z79.01 CHRONIC ANTICOAGULATION: ICD-10-CM

## 2019-04-16 LAB — INR PPP: 2.3 (ref 2–3.5)

## 2019-04-16 NOTE — PROGRESS NOTES
OP Telephone Anticoagulation Service Note    Date: 2019      Anticoagulation Summary  As of 2019    INR goal:   2.0-3.0   TTR:   73.7 % (3.7 y)   INR used for dosin.3 (2019)   Warfarin maintenance plan:   3.75 mg (2.5 mg x 1.5) every Mon, Thu; 2.5 mg (2.5 mg x 1) all other days   Weekly warfarin total:   20 mg   Plan last modified:   Massiel Coleman (2019)   Next INR check:   2019   Target end date:   Indefinite    Indications    Atrial fibrillation (HCC) (Resolved) [I48.91]  Chronic anticoagulation [Z79.01]             Anticoagulation Episode Summary     INR check location:   Home Draw    Preferred lab:       Send INR reminders to:       Comments:   Winston YEAGER      Anticoagulation Care Providers     Provider Role Specialty Phone number    Lizzy Haywood M.D. Referring Cardiology [phone]    [facility] Anticoagulation Services Responsible  [phone]    Peter HollowayD Responsible          Anticoagulation Patient Findings        Plan: Spoke with patient on the phone. Patient is  therapeutic today. Confirmed dosing.  Will continue dosing as outlined. Will follow-up with patient in 2 week(s).    Pt is needing new rx for test strips to [facility], this has been sent in multiple times by Peter Blanco Pharm D Kara A Gurries, Tim

## 2019-04-18 DIAGNOSIS — I48.19 PERSISTENT ATRIAL FIBRILLATION (HCC): ICD-10-CM

## 2019-04-18 RX ORDER — WARFARIN SODIUM 2.5 MG/1
2.5-3.75 TABLET ORAL DAILY
Qty: 135 TAB | Refills: 2 | Status: SHIPPED | OUTPATIENT
Start: 2019-04-18 | End: 2020-04-06

## 2019-04-19 ENCOUNTER — TELEPHONE (OUTPATIENT)
Dept: VASCULAR LAB | Facility: MEDICAL CENTER | Age: 79
End: 2019-04-19

## 2019-04-19 NOTE — TELEPHONE ENCOUNTER
Spoke with Jossy, who informed me that her order for coagucheck strips has been on HOLD until her fill date of 04/02/2019 (17 days ago).  No prior auth required.  She will send an urgent e-mail to check on this order.  Massiel Coleman, Clinical Pharmacist

## 2019-04-24 DIAGNOSIS — K21.00 GASTROESOPHAGEAL REFLUX DISEASE WITH ESOPHAGITIS: ICD-10-CM

## 2019-04-24 RX ORDER — RANITIDINE 150 MG/1
TABLET ORAL
Qty: 180 TAB | Refills: 3 | Status: SHIPPED | OUTPATIENT
Start: 2019-04-24 | End: 2020-03-02

## 2019-04-25 DIAGNOSIS — I10 ESSENTIAL HYPERTENSION: ICD-10-CM

## 2019-04-25 RX ORDER — LISINOPRIL 5 MG/1
5 TABLET ORAL
Qty: 100 TAB | Refills: 1 | Status: SHIPPED | OUTPATIENT
Start: 2019-04-25 | End: 2020-07-06 | Stop reason: SDUPTHER

## 2019-04-30 ENCOUNTER — ANTICOAGULATION MONITORING (OUTPATIENT)
Dept: VASCULAR LAB | Facility: MEDICAL CENTER | Age: 79
End: 2019-04-30

## 2019-04-30 DIAGNOSIS — Z79.01 CHRONIC ANTICOAGULATION: ICD-10-CM

## 2019-04-30 LAB — INR PPP: 2.2 (ref 2–3.5)

## 2019-04-30 NOTE — PROGRESS NOTES
Anticoagulation Summary  As of 2019    INR goal:   2.0-3.0   TTR:   74.0 % (3.8 y)   INR used for dosin.20 (2019)   Warfarin maintenance plan:   3.75 mg (2.5 mg x 1.5) every Mon, Thu; 2.5 mg (2.5 mg x 1) all other days   Weekly warfarin total:   20 mg   Plan last modified:   Massiel Coleman (2019)   Next INR check:   2019   Target end date:   Indefinite    Indications    Atrial fibrillation (HCC) (Resolved) [I48.91]  Chronic anticoagulation [Z79.01]             Anticoagulation Episode Summary     INR check location:   Home Draw    Preferred lab:       Send INR reminders to:       Comments:   Winston YEAGER      Anticoagulation Care Providers     Provider Role Specialty Phone number    Lizzy Haywood M.D. Referring Cardiology [phone]    [facility] Anticoagulation Services Responsible  [phone]    Vladimir Taylor, PharmD Responsible          Anticoagulation Patient Findings    Spoke with OIa to report a therapeutic INR of 2.2. Continue current dosing regimen.  Follow up in 2 weeks, to reduce the risk of adverse events related to this high risk medication, warfarin.    Massiel Coleman, Clinical Pharmacist

## 2019-05-02 ENCOUNTER — TELEPHONE (OUTPATIENT)
Dept: VASCULAR LAB | Facility: MEDICAL CENTER | Age: 79
End: 2019-05-02

## 2019-05-02 NOTE — TELEPHONE ENCOUNTER
Tried two more times to reach [facility] to check on her claim.  Both times on hold >45 minutes.  Message left for pharmacy manager  Massiel Coleman, Clinical Pharmacist

## 2019-05-02 NOTE — TELEPHONE ENCOUNTER
jossy called to report that her claim for coagucheck strips has not yet been paid.  Jossy resubmitted to [facility].  Massiel Coleman, Clinical Pharmacist      Call placed to [facility], however I was placed on hold for >25 minutes with no response.  Massiel Coleman Clinical Pharmacist

## 2019-05-02 NOTE — TELEPHONE ENCOUNTER
Spoke with [facility] pharmacy manager to try to resolve claim issue for this patient.  Will continue to follow.  Massiel Coleman, Clinical Pharmacist

## 2019-05-03 ENCOUNTER — TELEPHONE (OUTPATIENT)
Dept: VASCULAR LAB | Facility: MEDICAL CENTER | Age: 79
End: 2019-05-03

## 2019-05-03 ENCOUNTER — OFFICE VISIT (OUTPATIENT)
Dept: CARDIOLOGY | Facility: MEDICAL CENTER | Age: 79
End: 2019-05-03
Payer: MEDICARE

## 2019-05-03 ENCOUNTER — NON-PROVIDER VISIT (OUTPATIENT)
Dept: CARDIOLOGY | Facility: MEDICAL CENTER | Age: 79
End: 2019-05-03
Payer: MEDICARE

## 2019-05-03 VITALS
OXYGEN SATURATION: 93 % | BODY MASS INDEX: 32.1 KG/M2 | WEIGHT: 188 LBS | DIASTOLIC BLOOD PRESSURE: 42 MMHG | HEART RATE: 80 BPM | SYSTOLIC BLOOD PRESSURE: 110 MMHG | HEIGHT: 64 IN

## 2019-05-03 DIAGNOSIS — Z79.01 CHRONIC ANTICOAGULATION: ICD-10-CM

## 2019-05-03 DIAGNOSIS — I49.5 SICK SINUS SYNDROME (HCC): ICD-10-CM

## 2019-05-03 DIAGNOSIS — Z95.0 CARDIAC PACEMAKER IN SITU: ICD-10-CM

## 2019-05-03 DIAGNOSIS — I25.10 CORONARY ARTERY DISEASE INVOLVING NATIVE CORONARY ARTERY OF NATIVE HEART WITHOUT ANGINA PECTORIS: ICD-10-CM

## 2019-05-03 DIAGNOSIS — I48.19 PERSISTENT ATRIAL FIBRILLATION (HCC): ICD-10-CM

## 2019-05-03 PROBLEM — E66.9 OBESITY (BMI 30-39.9): Status: RESOLVED | Noted: 2017-03-08 | Resolved: 2019-05-03

## 2019-05-03 PROBLEM — R60.9 PERIPHERAL EDEMA: Status: RESOLVED | Noted: 2019-02-13 | Resolved: 2019-05-03

## 2019-05-03 PROBLEM — M25.551 RIGHT HIP PAIN: Status: RESOLVED | Noted: 2019-02-13 | Resolved: 2019-05-03

## 2019-05-03 PROBLEM — R60.0 PERIPHERAL EDEMA: Status: RESOLVED | Noted: 2019-02-13 | Resolved: 2019-05-03

## 2019-05-03 PROCEDURE — 93280 PM DEVICE PROGR EVAL DUAL: CPT | Performed by: INTERNAL MEDICINE

## 2019-05-03 PROCEDURE — 99214 OFFICE O/P EST MOD 30 MIN: CPT | Mod: 25 | Performed by: INTERNAL MEDICINE

## 2019-05-03 ASSESSMENT — ENCOUNTER SYMPTOMS
WEIGHT LOSS: 0
INSOMNIA: 0
BLOOD IN STOOL: 0
DIARRHEA: 0
NERVOUS/ANXIOUS: 0
CONSTIPATION: 0
NAUSEA: 0
PND: 0
LOSS OF CONSCIOUSNESS: 0
BACK PAIN: 1
DIZZINESS: 0
MYALGIAS: 0
ABDOMINAL PAIN: 0
DEPRESSION: 0
HEARTBURN: 0
SHORTNESS OF BREATH: 0
FALLS: 0
EYES NEGATIVE: 1
BRUISES/BLEEDS EASILY: 0
MEMORY LOSS: 0
PALPITATIONS: 0
COUGH: 0

## 2019-05-03 NOTE — PROGRESS NOTES
"Chief Complaint   Patient presents with   • Coronary Artery Disease     follow up       Subjective:   Donte Magaña is a 78 y.o. female who presents today In follow-up in regards to her moderate coronary disease with normal stress testing in 2012, mild mitral regurgitation, Persistent symptomatic atrial fibrillation status post pacemaker and rate control       In with her , feeling better outside of her back pain which is horrible.  Dr. Haji planning injection    Compliant on her anticoagulation and other medicines.  Blood pressure better    Past Medical History:   Diagnosis Date   • A-fib (HCC)    • Anesthesia     \"Tachycardia for 5 days after cataract surgery\"   • Anticoagulant long-term use 1/12/2012   • Arthritis     Knees, hips   • Asthma     with pneumonia, nothing for 3 years   • Atrial fibrillation (HCC)    • Backpain     R hip   • Bowel habit changes     diarrhea   • Breath shortness     with exertion O2  2l/m PRN, hasnt used for 3 years   • Bronchitis Nov, 2013   • CAD (coronary artery disease)    • Depression    • Glaucoma 5/3/2011   • Hematoma complicating a procedure 11/3/2012   • Hemorrhagic disorder ([facility])     bruising/coumadin   • High cholesterol    • Hypertension    • Hypothyroid    • Lupus    • Macular degeneration    • Menopause 1/12/2012   • Mitral regurgitation 10/30/2012   • Obesity 1/12/2012   • Pacemaker 2018   • Pneumonia feb,2013   • Pre-syncope 6/29/2018   • Pulmonary hypertension (HCC) 10/30/2012   • PVC's (premature ventricular contractions) 1/12/2012   • Senile nuclear sclerosis    • Spinal stenosis of lumbar region at multiple levels    • Unspecified cataract     repaired bilateral   • Unspecified urinary incontinence      Past Surgical History:   Procedure Laterality Date   • IRRIGATION & DEBRIDEMENT ORTHO Right 2/14/2019    Procedure: IRRIGATION & DEBRIDEMENT ORTHO-HIP WOUND ;  Surgeon: Vladimir Lee M.D.;  Location: SURGERY [facility];  Service: Orthopedics   • " HIP ARTH ANTERIOR TOTAL Right 1/17/2019    Procedure: HIP ARTHROPLASTY ANTERIOR TOTAL;  Surgeon: Juan C Mercedes M.D.;  Location: SURGERY [facility];  Service: Orthopedics   • PACEMAKER INSERTION  06/30/2018    Dual Chamber   • KNEE ARTHROPLASTY TOTAL Right 6/23/2016    Procedure: KNEE ARTHROPLASTY TOTAL;  Surgeon: Heriberto Lozada M.D.;  Location: SURGERY [facility];  Service:    • KNEE ARTHROPLASTY TOTAL Left 5/28/2015    Procedure: KNEE ARTHROPLASTY TOTAL;  Surgeon: Heriberto Lozada M.D.;  Location: SURGERY [facility];  Service:    • CATARACT PHACO WITH IOL Right 5/5/2015    Procedure: IOL OD - STANDARD;  Surgeon: Dmitry Bejarano M.D.;  Location: [facility];  Service:    • CATARACT PHACO WITH IOL  4/21/2015    Performed by Dmitry Bejarano M.D. at [facility]   • RECOVERY  11/30/2010    Performed by SURGERY, [facility]-RECOVERY at SURGERY SAME DAY [facility]   • COLONOSCOPY  2008    Normal    [facility]   • ABDOMINAL HYSTERECTOMY TOTAL  April 15,1975    still has ovaries   • OPEN REDUCTION      left ankle   • OTHER      Removed pins from left ankle   • OTHER CARDIAC SURGERY  12/2017 and 07/19/2018     Cardiac Ablation   • TONSILLECTOMY AND ADENOIDECTOMY       Family History   Problem Relation Age of Onset   • Stroke Mother    • Diabetes Father    • Stroke Sister    • Heart Disease Brother    • Stroke Sister    • GI Daughter         Crohn's Disease   • Heart Disease Daughter         CHF   • Other Daughter         Chronic Pain--Lymphedema   • Cancer Paternal Aunt      Social History     Social History   • Marital status:      Spouse name: N/A   • Number of children: N/A   • Years of education: N/A     Occupational History   • Not on file.     Social History Main Topics   • Smoking status: Never Smoker   • Smokeless tobacco: Never Used   • Alcohol use No   • Drug use: No   • Sexual activity: Not Currently     Partners: Male     Birth control/ protection:  "Post-Menopausal     Other Topics Concern   • Not on file     Social History Narrative   • No narrative on file     Allergies   Allergen Reactions   • Amiodarone Hives     Throat and tongue itching   • Bactrim Shortness of Breath   • Cipro Xr Swelling   • Metoprolol Swelling     Causes throat swelling   • Morphine Unspecified     Hallucinations   • Phytoplex Z-Guard [Petrolatum-Zinc Oxide] Unspecified     \"causes burning\"   • Pseudoephedrine Palpitations   • Qvar [Beclomethasone Dipropionate] Unspecified     Pressure on heart     • Vibramycin Shortness of Breath   • Atorvastatin Calcium-Polysorbate 80 Unspecified     Muscle aches     • Augmentin Unspecified     Unknown reaction   • Diltiazem Rash     rash   • Flecainide Unspecified     dizziness   • Keflex Unspecified     Pt states \"Unsure\".   • Mucinex Unspecified     GI Distress     • Tramadol Unspecified     crying   • Atorvastatin Myalgia   • Tape Rash     Paper tape okay     Outpatient Encounter Prescriptions as of 5/3/2019   Medication Sig Dispense Refill   • lisinopril (PRINIVIL) 5 MG Tab Take 1 Tab by mouth every day. 100 Tab 1   • raNITidine (ZANTAC) 150 MG Tab TAKE ONE TABLET BY MOUTH TWICE DAILY 180 Tab 3   • warfarin (COUMADIN) 2.5 MG Tab Take 1-1.5 Tabs by mouth every day. As directed by the [facility] 135 Tab 2   • atenolol (TENORMIN) 50 MG Tab Take 1 Tab by mouth 2 times a day. 180 Tab 3   • furosemide (LASIX) 40 MG Tab Take 1 Tab by mouth every day. 90 Tab 3   • nystatin (MYCOSTATIN) powder Apply up to 4 times a day to groin rash after washing with mild soap and water 30 g 1   • sennosides (SENOKOT) 8.6 MG Tab Take 17.2 mg by mouth every day.     • Alpha-D-Galactosidase (BEANO) Tab Take 2 tablet by mouth as needed. swallow or chew 2 tablets.     • Acetaminophen 500 MG Cap Take 2 Caps by mouth 3 times a day.     • estradiol (ESTRACE) 2 MG Tab TAKE ONE TABLET BY MOUTH EVERY DAY 90 Tab 3   • spironolactone (ALDACTONE) 50 MG Tab TAKE  " ONE TABLET BY MOUTH EVERY MORNING AND EVERY EVENING 180 Tab 3   • magnesium oxide (MAG-OX) 400 (241.3 Mg) MG Tab tablet Take 1 Tab by mouth every day. (Patient taking differently: Take 1 Tab by mouth every day. Pt instructed to take 200mg not 400mg) 30 Tab 6   • PREBIOTIC PRODUCT PO Take 2 Tabs by mouth every day.     • ipratropium (ATROVENT) 0.03 % Solution Spray 2 Sprays in nose every 12 hours. 1 Bottle 1   • sertraline (ZOLOFT) 50 MG Tab Take 1 Tab by mouth every day. 90 Tab 3   • Lutein 20 MG Cap Take 1 Cap by mouth every day. 90 Cap 3   • levothyroxine (SYNTHROID) 50 MCG Tab TAKE 1 TABLET BY MOUTH EVERY MORNING ON AN EMPTY STOMACH. 90 Tab 3   • vitamin D (CHOLECALCIFEROL) 1000 UNIT TABS Take 2,000 Units by mouth every day.     • Misc. Devices Misc Dispense test strips to be used with home INR testing for warfarin anticoagulation dose regulation 100 Strip 2   • oxyCODONE immediate release (ROXICODONE) 10 MG immediate release tablet Take 10 mg by mouth 2 times a day as needed for Severe Pain.       No facility-administered encounter medications on file as of 5/3/2019.      Review of Systems   Constitutional: Negative for malaise/fatigue and weight loss.   Eyes: Negative.    Respiratory: Negative for cough and shortness of breath.    Cardiovascular: Negative for chest pain, palpitations, leg swelling and PND.   Gastrointestinal: Negative for abdominal pain, blood in stool, constipation, diarrhea, heartburn, melena and nausea.   Genitourinary: Negative.    Musculoskeletal: Positive for back pain. Negative for falls, joint pain and myalgias.        No changes   Neurological: Negative for dizziness and loss of consciousness.   Endo/Heme/Allergies: Does not bruise/bleed easily.   Psychiatric/Behavioral: Negative for depression and memory loss. The patient is not nervous/anxious and does not have insomnia.    All other systems reviewed and are negative.       Objective:   /42 (BP Location: Left arm, Patient  "Position: Sitting)   Pulse 80   Ht 1.626 m (5' 4\")   Wt 85.3 kg (188 lb)   LMP 01/01/1993   SpO2 93%   BMI 32.27 kg/m²      Physical Exam   Constitutional: She is oriented to person, place, and time. She appears well-developed and well-nourished.   No changes on my exam, repeated again today   HENT:   Head: Normocephalic and atraumatic.   Mouth/Throat: Mucous membranes are normal. No oropharyngeal exudate.   Eyes: Pupils are equal, round, and reactive to light. EOM are normal. No scleral icterus.   Neck: No JVD present. No thyroid mass and no thyromegaly present.   Cardiovascular: Normal rate, regular rhythm and intact distal pulses.  Exam reveals no gallop.    Murmur heard.  2/6 systolic blowing murmur;  heart rate 80 on my exam.  Pacemaker in left chest   Pulmonary/Chest: Effort normal and breath sounds normal.   Abdominal: Bowel sounds are normal.   Musculoskeletal: Normal range of motion. She exhibits edema (1+ nonpitting bilaterally no stasis changes or ulcers). She exhibits no tenderness.   Neurological: She is alert and oriented to person, place, and time. She has normal strength. She displays no tremor. She exhibits normal muscle tone. Coordination normal.   Skin: Skin is warm and dry. No rash noted.   Psychiatric: She has a normal mood and affect. Her behavior is normal.   Vitals reviewed.      Assessment:     1. Cardiac pacemaker in situ     2. Chronic anticoagulation     3. Coronary artery disease involving native coronary artery of native heart without angina pectoris     4. Persistent atrial fibrillation (HCC)         Medical Decision Making:  Today's Assessment / Status / Plan:     Mitral regurgitation  Looked at the images of her echocardiogram  No deterioration still mild  Talked aboutWhat probably is some degree of ischemia because she does have significant right-sided disease.  Repeat echocardiogram and nuclear test if needed 2020    coronary disease  Statin, aspirin low-dose on warfarin if " possible discussed  Blood pressure control is excellent  Stress testing if symptomatic    Her depression and anxiety seems better    Pacemaker sick sinus syndrome  Reviewed tracings no changes made, great rate control  Continue anticoagulation for permanent atrial fibrillation/persistent, ablation 2017    Injection   Low risk - ok to hold AC per CC guidelines    RTC 6 months

## 2019-05-03 NOTE — TELEPHONE ENCOUNTER
Received word from [facility] that her prior approval was completed yesterday and faxed over to [employer].    This morning I phoned [employer], however, they are unable to locate the PRIOR AUTH and are unable to process the claim.      I have requested a supervisor return my call to update me on the status of this patient's claim.  Massiel Coleman, Clinical Pharmacist

## 2019-05-03 NOTE — LETTER
"     [facility]   [address], [address]  [place] [address]  Phone: [phone]  Fax: [phone]              Donte Magaña  1940    Encounter Date: 5/3/2019    Lizzy Haywood M.D.          PROGRESS NOTE:  Chief Complaint   Patient presents with   • Coronary Artery Disease     follow up       Subjective:   Donte Magaña is a 78 y.o. female who presents today In follow-up in regards to her moderate coronary disease with normal stress testing in 2012, mild mitral regurgitation, Persistent symptomatic atrial fibrillation status post pacemaker and rate control       In with her , feeling better outside of her back pain which is horrible.  Dr. Haji planning injection    Compliant on her anticoagulation and other medicines.  Blood pressure better    Past Medical History:   Diagnosis Date   • A-fib (HCC)    • Anesthesia     \"Tachycardia for 5 days after cataract surgery\"   • Anticoagulant long-term use 1/12/2012   • Arthritis     Knees, hips   • Asthma     with pneumonia, nothing for 3 years   • Atrial fibrillation (HCC)    • Backpain     R hip   • Bowel habit changes     diarrhea   • Breath shortness     with exertion O2  2l/m PRN, hasnt used for 3 years   • Bronchitis Nov, 2013   • CAD (coronary artery disease)    • Depression    • Glaucoma 5/3/2011   • Hematoma complicating a procedure 11/3/2012   • Hemorrhagic disorder (HCC)     bruising/coumadin   • High cholesterol    • Hypertension    • Hypothyroid    • Lupus    • Macular degeneration    • Menopause 1/12/2012   • Mitral regurgitation 10/30/2012   • Obesity 1/12/2012   • Pacemaker 2018   • Pneumonia feb,2013   • Pre-syncope 6/29/2018   • Pulmonary hypertension (HCC) 10/30/2012   • PVC's (premature ventricular contractions) 1/12/2012   • Senile nuclear sclerosis    • Spinal stenosis of lumbar region at multiple levels    • Unspecified cataract     repaired bilateral   • Unspecified urinary " incontinence      Past Surgical History:   Procedure Laterality Date   • IRRIGATION & DEBRIDEMENT ORTHO Right 2/14/2019    Procedure: IRRIGATION & DEBRIDEMENT ORTHO-HIP WOUND ;  Surgeon: Vladimir Lee M.D.;  Location: SURGERY [facility];  Service: Orthopedics   • HIP ARTH ANTERIOR TOTAL Right 1/17/2019    Procedure: HIP ARTHROPLASTY ANTERIOR TOTAL;  Surgeon: Juan C Mercedes M.D.;  Location: SURGERY [facility];  Service: Orthopedics   • PACEMAKER INSERTION  06/30/2018    Dual Chamber   • KNEE ARTHROPLASTY TOTAL Right 6/23/2016    Procedure: KNEE ARTHROPLASTY TOTAL;  Surgeon: Heriberto Lozada M.D.;  Location: SURGERY [facility];  Service:    • KNEE ARTHROPLASTY TOTAL Left 5/28/2015    Procedure: KNEE ARTHROPLASTY TOTAL;  Surgeon: Heriberto Lozada M.D.;  Location: SURGERY [facility];  Service:    • CATARACT PHACO WITH IOL Right 5/5/2015    Procedure: IOL OD - STANDARD;  Surgeon: Dmitry Bejarano M.D.;  Location: [facility];  Service:    • CATARACT PHACO WITH IOL  4/21/2015    Performed by Dmitry Bejarano M.D. at [facility]   • RECOVERY  11/30/2010    Performed by SURGERY, [facility]-RECOVERY at SURGERY SAME DAY [facility]   • COLONOSCOPY  2008    Normal    [facility]   • ABDOMINAL HYSTERECTOMY TOTAL  April 15,1975    still has ovaries   • OPEN REDUCTION      left ankle   • OTHER      Removed pins from left ankle   • OTHER CARDIAC SURGERY  12/2017 and 07/19/2018     Cardiac Ablation   • TONSILLECTOMY AND ADENOIDECTOMY       Family History   Problem Relation Age of Onset   • Stroke Mother    • Diabetes Father    • Stroke Sister    • Heart Disease Brother    • Stroke Sister    • GI Daughter         Crohn's Disease   • Heart Disease Daughter         CHF   • Other Daughter         Chronic Pain--Lymphedema   • Cancer Paternal Aunt      Social History     Social History   • Marital status:      Spouse name: N/A   • Number of children: N/A   • Years of education: N/A   "    Occupational History   • Not on file.     Social History Main Topics   • Smoking status: Never Smoker   • Smokeless tobacco: Never Used   • Alcohol use No   • Drug use: No   • Sexual activity: Not Currently     Partners: Male     Birth control/ protection: Post-Menopausal     Other Topics Concern   • Not on file     Social History Narrative   • No narrative on file     Allergies   Allergen Reactions   • Amiodarone Hives     Throat and tongue itching   • Bactrim Shortness of Breath   • Cipro Xr Swelling   • Metoprolol Swelling     Causes throat swelling   • Morphine Unspecified     Hallucinations   • Phytoplex Z-Guard [Petrolatum-Zinc Oxide] Unspecified     \"causes burning\"   • Pseudoephedrine Palpitations   • Qvar [Beclomethasone Dipropionate] Unspecified     Pressure on heart     • Vibramycin Shortness of Breath   • Atorvastatin Calcium-Polysorbate 80 Unspecified     Muscle aches     • Augmentin Unspecified     Unknown reaction   • Diltiazem Rash     rash   • Flecainide Unspecified     dizziness   • Keflex Unspecified     Pt states \"Unsure\".   • Mucinex Unspecified     GI Distress     • Tramadol Unspecified     crying   • Atorvastatin Myalgia   • Tape Rash     Paper tape okay     Outpatient Encounter Prescriptions as of 5/3/2019   Medication Sig Dispense Refill   • lisinopril (PRINIVIL) 5 MG Tab Take 1 Tab by mouth every day. 100 Tab 1   • raNITidine (ZANTAC) 150 MG Tab TAKE ONE TABLET BY MOUTH TWICE DAILY 180 Tab 3   • warfarin (COUMADIN) 2.5 MG Tab Take 1-1.5 Tabs by mouth every day. As directed by the [facility] 135 Tab 2   • atenolol (TENORMIN) 50 MG Tab Take 1 Tab by mouth 2 times a day. 180 Tab 3   • furosemide (LASIX) 40 MG Tab Take 1 Tab by mouth every day. 90 Tab 3   • nystatin (MYCOSTATIN) powder Apply up to 4 times a day to groin rash after washing with mild soap and water 30 g 1   • sennosides (SENOKOT) 8.6 MG Tab Take 17.2 mg by mouth every day.     • Alpha-D-Galactosidase " (BEANO) Tab Take 2 tablet by mouth as needed. swallow or chew 2 tablets.     • Acetaminophen 500 MG Cap Take 2 Caps by mouth 3 times a day.     • estradiol (ESTRACE) 2 MG Tab TAKE ONE TABLET BY MOUTH EVERY DAY 90 Tab 3   • spironolactone (ALDACTONE) 50 MG Tab TAKE  ONE TABLET BY MOUTH EVERY MORNING AND EVERY EVENING 180 Tab 3   • magnesium oxide (MAG-OX) 400 (241.3 Mg) MG Tab tablet Take 1 Tab by mouth every day. (Patient taking differently: Take 1 Tab by mouth every day. Pt instructed to take 200mg not 400mg) 30 Tab 6   • PREBIOTIC PRODUCT PO Take 2 Tabs by mouth every day.     • ipratropium (ATROVENT) 0.03 % Solution Spray 2 Sprays in nose every 12 hours. 1 Bottle 1   • sertraline (ZOLOFT) 50 MG Tab Take 1 Tab by mouth every day. 90 Tab 3   • Lutein 20 MG Cap Take 1 Cap by mouth every day. 90 Cap 3   • levothyroxine (SYNTHROID) 50 MCG Tab TAKE 1 TABLET BY MOUTH EVERY MORNING ON AN EMPTY STOMACH. 90 Tab 3   • vitamin D (CHOLECALCIFEROL) 1000 UNIT TABS Take 2,000 Units by mouth every day.     • Misc. Devices Misc Dispense test strips to be used with home INR testing for warfarin anticoagulation dose regulation 100 Strip 2   • oxyCODONE immediate release (ROXICODONE) 10 MG immediate release tablet Take 10 mg by mouth 2 times a day as needed for Severe Pain.       No facility-administered encounter medications on file as of 5/3/2019.      Review of Systems   Constitutional: Negative for malaise/fatigue and weight loss.   Eyes: Negative.    Respiratory: Negative for cough and shortness of breath.    Cardiovascular: Negative for chest pain, palpitations, leg swelling and PND.   Gastrointestinal: Negative for abdominal pain, blood in stool, constipation, diarrhea, heartburn, melena and nausea.   Genitourinary: Negative.    Musculoskeletal: Positive for back pain. Negative for falls, joint pain and myalgias.        No changes   Neurological: Negative for dizziness and loss of consciousness.   Endo/Heme/Allergies: Does not  "bruise/bleed easily.   Psychiatric/Behavioral: Negative for depression and memory loss. The patient is not nervous/anxious and does not have insomnia.    All other systems reviewed and are negative.       Objective:   /42 (BP Location: Left arm, Patient Position: Sitting)   Pulse 80   Ht 1.626 m (5' 4\")   Wt 85.3 kg (188 lb)   LMP 01/01/1993   SpO2 93%   BMI 32.27 kg/m²      Physical Exam   Constitutional: She is oriented to person, place, and time. She appears well-developed and well-nourished.   No changes on my exam, repeated again today   HENT:   Head: Normocephalic and atraumatic.   Mouth/Throat: Mucous membranes are normal. No oropharyngeal exudate.   Eyes: Pupils are equal, round, and reactive to light. EOM are normal. No scleral icterus.   Neck: No JVD present. No thyroid mass and no thyromegaly present.   Cardiovascular: Normal rate, regular rhythm and intact distal pulses.  Exam reveals no gallop.    Murmur heard.  2/6 systolic blowing murmur;  heart rate 80 on my exam.  Pacemaker in left chest   Pulmonary/Chest: Effort normal and breath sounds normal.   Abdominal: Bowel sounds are normal.   Musculoskeletal: Normal range of motion. She exhibits edema (1+ nonpitting bilaterally no stasis changes or ulcers). She exhibits no tenderness.   Neurological: She is alert and oriented to person, place, and time. She has normal strength. She displays no tremor. She exhibits normal muscle tone. Coordination normal.   Skin: Skin is warm and dry. No rash noted.   Psychiatric: She has a normal mood and affect. Her behavior is normal.   Vitals reviewed.      Assessment:     1. Cardiac pacemaker in situ     2. Chronic anticoagulation     3. Coronary artery disease involving native coronary artery of native heart without angina pectoris     4. Persistent atrial fibrillation (HCC)         Medical Decision Making:  Today's Assessment / Status / Plan:     Mitral regurgitation  Looked at the images of her " echocardiogram  No deterioration still mild  Talked aboutWhat probably is some degree of ischemia because she does have significant right-sided disease.  Repeat echocardiogram and nuclear test if needed 2020    coronary disease  Statin, aspirin low-dose on warfarin if possible discussed  Blood pressure control is excellent  Stress testing if symptomatic    Her depression and anxiety seems better    Pacemaker sick sinus syndrome  Reviewed tracings no changes made, great rate control  Continue anticoagulation for permanent atrial fibrillation/persistent, ablation 2017    Injection   Low risk - ok to hold AC per CC guidelines    RTC 6 months      Fran Haji M.D.  [address]  [facility] [address]  [place] [address]  VIA Facsimile: [phone]

## 2019-05-06 DIAGNOSIS — B35.1 ONYCHOMYCOSIS: ICD-10-CM

## 2019-05-06 RX ORDER — CICLOPIROX 80 MG/ML
SOLUTION TOPICAL
Qty: 6.6 ML | Refills: 3 | Status: SHIPPED | OUTPATIENT
Start: 2019-05-06 | End: 2019-10-22

## 2019-05-07 ENCOUNTER — TELEPHONE (OUTPATIENT)
Dept: VASCULAR LAB | Facility: MEDICAL CENTER | Age: 79
End: 2019-05-07

## 2019-05-07 NOTE — TELEPHONE ENCOUNTER
Spoke with [employer], who allege that [facility] has not submitted the Prior Authorization for her coagucheck test strips.  Representative, reports that Prior Authorization still not received, and unable to locate.  These are generally received vie electronic fax.    Although I was able to provide the [email or web address] prior auth #[id number], and the dates of auth 05- - 05-.  I did not have the [facility] code so they are refusing to run the claim.     Jossy is unable to track their auths and unable to process any claims for this patient.    Will place a call to [facility] to see if we can get the [facility] pics code to process this.    45 minutes spent trying to get this resolved.  Massiel Coleman, Clinical Pharmacist

## 2019-05-07 NOTE — TELEPHONE ENCOUNTER
Spoke with Michaela at [facility], who reports that the claim is paid, and will ship out in the next day or two.  Massiel Coleman, Clinical Pharmacist

## 2019-05-14 ENCOUNTER — ANTICOAGULATION MONITORING (OUTPATIENT)
Dept: VASCULAR LAB | Facility: MEDICAL CENTER | Age: 79
End: 2019-05-14

## 2019-05-14 DIAGNOSIS — E03.8 OTHER SPECIFIED HYPOTHYROIDISM: ICD-10-CM

## 2019-05-14 DIAGNOSIS — Z79.01 CHRONIC ANTICOAGULATION: ICD-10-CM

## 2019-05-14 LAB — INR PPP: 2.6 (ref 2–3.5)

## 2019-05-14 RX ORDER — LEVOTHYROXINE SODIUM 0.05 MG/1
50 TABLET ORAL EVERY MORNING
Qty: 90 TAB | Refills: 3 | Status: SHIPPED | OUTPATIENT
Start: 2019-05-14 | End: 2020-05-11

## 2019-05-14 NOTE — PROGRESS NOTES
Anticoagulation Summary  As of 2019    INR goal:   2.0-3.0   TTR:   74.3 % (3.8 y)   INR used for dosin.60 (2019)   Warfarin maintenance plan:   3.75 mg (2.5 mg x 1.5) every Mon, Thu; 2.5 mg (2.5 mg x 1) all other days   Weekly warfarin total:   20 mg   Plan last modified:   Massiel DIAZ Filter (2019)   Next INR check:   2019   Target end date:   Indefinite    Indications    Atrial fibrillation (HCC) (Resolved) [I48.91]  Chronic anticoagulation [Z79.01]             Anticoagulation Episode Summary     INR check location:   Home Draw    Preferred lab:       Send INR reminders to:       Comments:   Winston YEAGER      Anticoagulation Care Providers     Provider Role Specialty Phone number    Lizzy Haywood M.D. Referring Cardiology [phone]    [facility] Responsible  [phone]    Vladimir Taylor, PharmD Responsible          Anticoagulation Patient Findings      INR  therapeutic.   Left a voice message for the patient, asked patient to please call the anticoagulation clinic if they have any signs/symptoms of bleeding and/or thrombosis or any changes to diet or medications.    Follow up appointment in 2 week(s)    Continue weekly warfarin dose as noted      Fran Leyva, PharmD

## 2019-05-14 NOTE — TELEPHONE ENCOUNTER
Was the patient seen in the last year in this department? Yes LOV 12/05/18 LABS tsh 02/13/19    Does patient have an active prescription for medications requested? No     Received Request Via: Pharmacy

## 2019-05-16 ENCOUNTER — ANTICOAGULATION MONITORING (OUTPATIENT)
Dept: VASCULAR LAB | Facility: MEDICAL CENTER | Age: 79
End: 2019-05-16

## 2019-05-16 ENCOUNTER — TELEPHONE (OUTPATIENT)
Dept: VASCULAR LAB | Facility: MEDICAL CENTER | Age: 79
End: 2019-05-16

## 2019-05-16 DIAGNOSIS — Z79.01 CHRONIC ANTICOAGULATION: ICD-10-CM

## 2019-05-16 NOTE — PROGRESS NOTES
Anticoagulation Summary  As of 5/16/2019    INR goal:   2.0-3.0   TTR:   74.3 % (3.8 y)   INR used for dosing:   No new INR was available at the time of this encounter.   Warfarin maintenance plan:   3.75 mg (2.5 mg x 1.5) every Mon, Thu; 2.5 mg (2.5 mg x 1) all other days   Weekly warfarin total:   20 mg   Plan last modified:   Massiel Coleman (2/5/2019)   Next INR check:   6/7/2019   Target end date:   Indefinite    Indications    Atrial fibrillation (HCC) (Resolved) [I48.91]  Chronic anticoagulation [Z79.01]             Anticoagulation Episode Summary     INR check location:   Home Draw    Preferred lab:       Send INR reminders to:       Comments:   Winston YEAGER      Anticoagulation Care Providers     Provider Role Specialty Phone number    Lizzy Haywood M.D. Referring Cardiology [phone]    [facility] Anticoagulation Services Responsible  [phone]    Vladimir Taylor, PharmD Responsible          Anticoagulation Patient Findings    Spoke with Donte to discuss periprocedural anticoagulation management.  Her Epidural is scheduled for May 31, 2019  May 26- last dose of warfarin.  May 27- Hold warfarin  May 28- Hold warfarin  May 29- Hold warfarin  May 30- Hold warfarin  May 31- procedure day no blood thinners  June 1- restart warfarin  Massiel Coleman, Clinical Pharmacist

## 2019-05-16 NOTE — TELEPHONE ENCOUNTER
[facility]    Received message pt has upcoming epidural.  No mention of stroke or TIA mentioned. Likely pt should hold warfarin 5 days without bridging.  Requested pt call back to confirm no hx of TIA or stroke.  Otherwise continue with 5 day hold for epidural and restart warfarin 24 hours after the procedure.    Juan Blanco, PharmD

## 2019-05-29 ENCOUNTER — ANTICOAGULATION MONITORING (OUTPATIENT)
Dept: VASCULAR LAB | Facility: MEDICAL CENTER | Age: 79
End: 2019-05-29

## 2019-05-29 DIAGNOSIS — Z79.01 CHRONIC ANTICOAGULATION: ICD-10-CM

## 2019-05-29 LAB — INR PPP: 1.2 (ref 2–3.5)

## 2019-05-29 NOTE — PROGRESS NOTES
Anticoagulation Summary  As of 2019    INR goal:   2.0-3.0   TTR:   73.9 % (3.9 y)   INR used for dosin.20! (2019)   Warfarin maintenance plan:   3.75 mg (2.5 mg x 1.5) every Mon, Thu; 2.5 mg (2.5 mg x 1) all other days   Weekly warfarin total:   20 mg   Plan last modified:   Massiel M Filter (2019)   Next INR check:      Target end date:   Indefinite    Indications    Atrial fibrillation (HCC) (Resolved) [I48.91]  Chronic anticoagulation [Z79.01]             Anticoagulation Episode Summary     INR check location:   Home Draw    Preferred lab:       Send INR reminders to:       Comments:   Winston YEAGER      Anticoagulation Care Providers     Provider Role Specialty Phone number    Lizzy Haywood M.D. Referring Cardiology [phone]    [facility] Anticoagulation Services Responsible  [phone]    Vladimir Taylor, PharmD Responsible          Anticoagulation Patient Findings        HPI:  Donte Magaña, on anticoagulation therapy with warfarin for Af.   Changes to current medical/health status since last appt: currently bridging with enoxaparin .   Denies signs/symptoms of bleeding and/or thrombosis since the last appt.    Denies any interval changes to diet  Denies any interval changes to medications since last appt.   Denies any complications or cost restrictions with current therapy.     A/P   INR  SUB-therapeutic.   Continue with prior bridging instructions.    Next INR in 1 week(s).    Juan Blanco, PharmD

## 2019-06-03 ENCOUNTER — ANTICOAGULATION MONITORING (OUTPATIENT)
Dept: VASCULAR LAB | Facility: MEDICAL CENTER | Age: 79
End: 2019-06-03

## 2019-06-03 DIAGNOSIS — Z79.01 CHRONIC ANTICOAGULATION: ICD-10-CM

## 2019-06-03 LAB — INR PPP: 0.9 (ref 2–3.5)

## 2019-06-03 NOTE — PROGRESS NOTES
Anticoagulation Summary  As of 6/3/2019    INR goal:   2.0-3.0   TTR:   73.7 % (3.9 y)   INR used for dosin.90! (6/3/2019)   Warfarin maintenance plan:   3.75 mg (2.5 mg x 1.5) every Mon, Thu; 2.5 mg (2.5 mg x 1) all other days   Weekly warfarin total:   20 mg   Plan last modified:   Massiel DIAZ Filter (2019)   Next INR check:   2019   Target end date:   Indefinite    Indications    Atrial fibrillation (HCC) (Resolved) [I48.91]  Chronic anticoagulation [Z79.01]             Anticoagulation Episode Summary     INR check location:   Home Draw    Preferred lab:       Send INR reminders to:       Comments:   Winston YEAGER      Anticoagulation Care Providers     Provider Role Specialty Phone number    Lizzy Haywood M.D. Referring Cardiology [phone]    [facility] Anticoagulation Services Responsible  [phone]    Vladimir Taylor, PharmD Responsible          Anticoagulation Patient Findings    Left voicemail message to report a subtherapeutic INR of 0.9.  Requested pt contact the clinic for any s/s of unusual bleeding, bruising, clotting or any changes to diet or medication.   Instructed patient to bolus with 5mg X 2, then resume current warfarin regimen.  Asked that patient continue with lovenox bridge until INR >2.0.  FU 3 days.  Vladimir Taylor, PharmD

## 2019-06-04 DIAGNOSIS — L03.90 CELLULITIS, UNSPECIFIED CELLULITIS SITE: ICD-10-CM

## 2019-06-04 RX ORDER — BACITRACIN ZINC AND POLYMYXIN B SULFATE 500; 1000 [USP'U]/G; [USP'U]/G
OINTMENT TOPICAL
Qty: 15 G | Refills: 1 | Status: SHIPPED
Start: 2019-06-04 | End: 2020-01-10

## 2019-06-06 ENCOUNTER — ANTICOAGULATION MONITORING (OUTPATIENT)
Dept: VASCULAR LAB | Facility: MEDICAL CENTER | Age: 79
End: 2019-06-06

## 2019-06-06 DIAGNOSIS — Z79.01 CHRONIC ANTICOAGULATION: ICD-10-CM

## 2019-06-06 LAB — INR PPP: 1.5 (ref 2–3.5)

## 2019-06-06 NOTE — PROGRESS NOTES
Anticoagulation Summary  As of 2019    INR goal:   2.0-3.0   TTR:   73.5 % (3.9 y)   INR used for dosin.50! (2019)   Warfarin maintenance plan:   3.75 mg (2.5 mg x 1.5) every Mon, Thu; 2.5 mg (2.5 mg x 1) all other days   Weekly warfarin total:   20 mg   Plan last modified:   Massiel DIAZ Filter (2019)   Next INR check:   6/10/2019   Target end date:   Indefinite    Indications    Atrial fibrillation (HCC) (Resolved) [I48.91]  Chronic anticoagulation [Z79.01]             Anticoagulation Episode Summary     INR check location:   Home Draw    Preferred lab:       Send INR reminders to:       Comments:   Winston YEAGER      Anticoagulation Care Providers     Provider Role Specialty Phone number    Lizzy Haywood M.D. Referring Cardiology [phone]    [facility] Anticoagulation Services Responsible  [phone]    Vladimir Taylor, PharmD Responsible          Anticoagulation Patient Findings  Patient Findings     Positives:   Change in health    Negatives:   Signs/symptoms of thrombosis, Signs/symptoms of bleeding, Laboratory test error suspected, Change in alcohol use, Change in activity, Upcoming invasive procedure, Emergency department visit, Upcoming dental procedure, Missed doses, Extra doses, Change in medications, Change in diet/appetite, Hospital admission, Bruising, Other complaints              Spoke with pt today about SUB-therapeutic INR. Pt denies any s/sx of bleeding or bruising as well as any changes to medications or diet. She did say she has a small knot on her elbow but isn't too concerned about it. Instructed her to watch her elbow and get it checked if it gets any worse. Pt is NOT currently on Lovenox, she hasn't been taking it since her procedure. Pt isn't high risk and bridging is not indicated. I consulted Juan about this patient and confirmed it was a miscommunication.     Pt to BOLUS X 2 days with 5 mg then continue normal regimen out lined above.    Follow up in 1 weeks, to reduce  risk of adverse events related to this high risk medication,  Warfarin.    Emeka Antunez, Pharmacy Intern

## 2019-06-10 ENCOUNTER — ANTICOAGULATION MONITORING (OUTPATIENT)
Dept: VASCULAR LAB | Facility: MEDICAL CENTER | Age: 79
End: 2019-06-10

## 2019-06-10 DIAGNOSIS — Z79.01 CHRONIC ANTICOAGULATION: ICD-10-CM

## 2019-06-10 LAB — INR PPP: 2.4 (ref 2–3.5)

## 2019-06-10 NOTE — PROGRESS NOTES
Anticoagulation Summary  As of 6/10/2019    INR goal:   2.0-3.0   TTR:   73.4 % (3.9 y)   INR used for dosin.40 (6/10/2019)   Warfarin maintenance plan:   3.75 mg (2.5 mg x 1.5) every Mon, Thu; 2.5 mg (2.5 mg x 1) all other days   Weekly warfarin total:   20 mg   Plan last modified:   Massiel Coleman (2019)   Next INR check:   2019   Target end date:   Indefinite    Indications    Atrial fibrillation (HCC) (Resolved) [I48.91]  Chronic anticoagulation [Z79.01]             Anticoagulation Episode Summary     INR check location:   Home Draw    Preferred lab:       Send INR reminders to:       Comments:   Winston YEAGER      Anticoagulation Care Providers     Provider Role Specialty Phone number    Lizzy Haywood M.D. Referring Cardiology [phone]    [facility] Anticoagulation Services Responsible  [phone]    Vladimir Taylor, PharmD Responsible          Anticoagulation Patient Findings          Spoke with Donte to report a therapeutic INR of 2.4. Continue current dosing regimen.  Follow up in 2 weeks, to reduce the risk of adverse events related to this high risk medication, warfarin.    Massiel Coleman, Clinical Pharmacist

## 2019-06-24 ENCOUNTER — ANTICOAGULATION MONITORING (OUTPATIENT)
Dept: MEDICAL GROUP | Facility: PHYSICIAN GROUP | Age: 79
End: 2019-06-24

## 2019-06-24 DIAGNOSIS — Z79.01 CHRONIC ANTICOAGULATION: ICD-10-CM

## 2019-06-24 LAB — INR PPP: 2.2 (ref 2–3.5)

## 2019-06-24 NOTE — PROGRESS NOTES
Anticoagulation Summary  As of 2019    INR goal:   2.0-3.0   TTR:   73.7 % (3.9 y)   INR used for dosin.20 (2019)   Warfarin maintenance plan:   3.75 mg (2.5 mg x 1.5) every Mon, Thu; 2.5 mg (2.5 mg x 1) all other days   Weekly warfarin total:   20 mg   No change documented:   Linus Raphael Ass't   Plan last modified:   Massiel M Filter (2019)   Next INR check:   2019   Target end date:   Indefinite    Indications    Atrial fibrillation (HCC) (Resolved) [I48.91]  Chronic anticoagulation [Z79.01]             Anticoagulation Episode Summary     INR check location:   Home Draw    Preferred lab:       Send INR reminders to:       Comments:   Winston YEAGER      Anticoagulation Care Providers     Provider Role Specialty Phone number    Lizzy Haywood M.D. Referring Cardiology [phone]    [facility] Anticoagulation Services Responsible  [phone]    Vladimir Taylor, PharmD Responsible          Anticoagulation Patient Findings  Patient Findings     Negatives:   Signs/symptoms of thrombosis, Signs/symptoms of bleeding, Laboratory test error suspected, Change in health, Change in alcohol use, Change in activity, Upcoming invasive procedure, Emergency department visit, Upcoming dental procedure, Missed doses, Extra doses, Change in medications, Change in diet/appetite, Hospital admission, Bruising, Other complaints      Spoke with patient  to report a therapeutic INR.  Pt instructed to continue with current warfarin dosing regimen. Pt denies any s/s of bleeding, bruising, clotting or any changes to diet or medication.  Will follow up in 2 weeks.  Linus Raphael Ass't          I have reviewed and concur with the above plan     Fran Leyva, PeterD

## 2019-06-26 DIAGNOSIS — J30.1 SEASONAL ALLERGIC RHINITIS DUE TO POLLEN: ICD-10-CM

## 2019-06-26 RX ORDER — IPRATROPIUM BROMIDE 21 UG/1
2 SPRAY, METERED NASAL EVERY 12 HOURS
Qty: 30 ML | Refills: 3 | Status: SHIPPED | OUTPATIENT
Start: 2019-06-26 | End: 2020-11-13 | Stop reason: SDUPTHER

## 2019-07-08 ENCOUNTER — ANTICOAGULATION MONITORING (OUTPATIENT)
Dept: VASCULAR LAB | Facility: MEDICAL CENTER | Age: 79
End: 2019-07-08

## 2019-07-08 DIAGNOSIS — Z79.01 CHRONIC ANTICOAGULATION: ICD-10-CM

## 2019-07-08 LAB — INR PPP: 1.9 (ref 2–3.5)

## 2019-07-08 NOTE — PROGRESS NOTES
Anticoagulation Summary  As of 2019    INR goal:   2.0-3.0   TTR:   73.6 % (4 y)   INR used for dosin.90! (2019)   Warfarin maintenance plan:   3.75 mg (2.5 mg x 1.5) every Mon, Thu; 2.5 mg (2.5 mg x 1) all other days   Weekly warfarin total:   20 mg   Plan last modified:   Massiel Coleman (2019)   Next INR check:   2019   Target end date:   Indefinite    Indications    Atrial fibrillation (HCC) (Resolved) [I48.91]  Chronic anticoagulation [Z79.01]             Anticoagulation Episode Summary     INR check location:   Home Draw    Preferred lab:       Send INR reminders to:       Comments:   Winston YEAGER      Anticoagulation Care Providers     Provider Role Specialty Phone number    Lizzy Haywood M.D. Referring Cardiology [phone]    [facility] Anticoagulation Services Responsible  [phone]    Vladimir Taylor, PharmD Responsible          Anticoagulation Patient Findings     Spoke with Mr. Magaña to report a sub therapeutic INR of 1.9.  Will bolus dose with 5mg tonight, then resume current dosing regimen. Follow up in 2 weeks, to reduce the risk of adverse events related to this high risk medication, warfarin.    Massiel Coleman, Clinical Pharmacist

## 2019-07-17 ENCOUNTER — TELEPHONE (OUTPATIENT)
Dept: MEDICAL GROUP | Facility: PHYSICIAN GROUP | Age: 79
End: 2019-07-17

## 2019-07-17 NOTE — TELEPHONE ENCOUNTER
Future Appointments       Provider Department Center    7/24/2019 2:20 PM Kishore Price M.D. [facility]    10/22/2019 3:15 PM PACER CHECK-[facility]     10/22/2019 3:45 PM Lizzy Haywood M.D. [facility]         ESTABLISHED PATIENT PRE-VISIT PLANNING     Patient was NOT contacted to complete PVP.    1.  Reviewed notes from the last few office visits within the medical group: Yes    2.  If any orders were placed at last visit or intended to be done for this visit (i.e. 6 mos follow-up), do we have Results/Consult Notes?        •  Labs - Labs were not ordered at last office visit.       •  Imaging - Imaging was not ordered at last office visit.       •  Referrals - No referrals were ordered at last office visit.    3. Is this appointment scheduled as a Hospital Follow-Up? No    4.  Immunizations were updated in [employer] using [employer]?: Yes       •  Web [employer] Recommendations: PNEUMOVAX (PPSV23), TD, VARICELLA (Chicken Pox)  and SHINGRIX (Shingles)    5.  Patient is due for the following Health Maintenance Topics:   Health Maintenance Due   Topic Date Due   • Annual Wellness Visit  1940   • IMM ZOSTER VACCINES (1 of 2) 11/16/1988   • MAMMOGRAM  02/05/2019       6. Orders for overdue Health Maintenance topics pended in Pre-Charting? N\A    7.  AHA (MDX) form printed for Provider? YES    8.  Patient was NOT informed to arrive 15 min prior to their scheduled appointment and bring in their medication bottles.

## 2019-07-22 ENCOUNTER — TELEPHONE (OUTPATIENT)
Dept: VASCULAR LAB | Facility: MEDICAL CENTER | Age: 79
End: 2019-07-22

## 2019-07-22 ENCOUNTER — ANTICOAGULATION MONITORING (OUTPATIENT)
Dept: VASCULAR LAB | Facility: MEDICAL CENTER | Age: 79
End: 2019-07-22

## 2019-07-22 DIAGNOSIS — Z79.01 CHRONIC ANTICOAGULATION: ICD-10-CM

## 2019-07-22 LAB — INR PPP: 1.9 (ref 2–3.5)

## 2019-07-22 NOTE — PROGRESS NOTES
OP Telephone Anticoagulation Service Note    Date: 2019      Anticoagulation Summary  As of 2019    INR goal:   2.0-3.0   TTR:   72.9 % (4 y)   INR used for dosin.90! (2019)   Warfarin maintenance plan:   3.75 mg (2.5 mg x 1.5) every Mon, Thu; 2.5 mg (2.5 mg x 1) all other days   Weekly warfarin total:   20 mg   Plan last modified:   Massiel DIAZ Filter (2019)   Next INR check:   2019   Target end date:   Indefinite    Indications    Atrial fibrillation (HCC) (Resolved) [I48.91]  Chronic anticoagulation [Z79.01]             Anticoagulation Episode Summary     INR check location:   Home Draw    Preferred lab:       Send INR reminders to:       Comments:   Winston YEAGER      Anticoagulation Care Providers     Provider Role Specialty Phone number    Lizzy Haywood M.D. Referring Cardiology [phone]    [facility] Anticoagulation Services Responsible  [phone]    Vladimir Taylor, PharmD Responsible          Anticoagulation Patient Findings        Plan: Spoke with patient on the phone. Patient is sub therapeutic today. Confirmed dosing. No missed tablets in the last week. Patient denies any changes in medications or diet. Patient denies any signs or symptoms of bleeding or clotting. Instructed patient to call clinic if any unusual bleeding or bruising occurs. Will increase weekly regimen. Will follow-up with patient in 2 week(s).              Ivone Barraza, PharmD

## 2019-07-22 NOTE — TELEPHONE ENCOUNTER
Pt called to see if she can get her coagucheck strips anyplace other than [facility] [place].  After checking with [facility], pt may also use [facility].  Contact information given.    Massiel Coleman, Clinical Pharmacist, CDE, CACP

## 2019-07-23 ENCOUNTER — TELEPHONE (OUTPATIENT)
Dept: VASCULAR LAB | Facility: MEDICAL CENTER | Age: 79
End: 2019-07-23

## 2019-07-23 NOTE — TELEPHONE ENCOUNTER
Received call from patient requesting we try getting coagucheck test strips from [facility].  After telephoning [facility], they DO NOT supply these and they have referred me to [facility].  Pt currently has a coagucheck monitor through [facility] Coleman, Clinical Pharmacist, CDE, CACP

## 2019-07-24 ENCOUNTER — OFFICE VISIT (OUTPATIENT)
Dept: MEDICAL GROUP | Facility: PHYSICIAN GROUP | Age: 79
End: 2019-07-24
Payer: MEDICARE

## 2019-07-24 VITALS
BODY MASS INDEX: 33.29 KG/M2 | HEART RATE: 99 BPM | DIASTOLIC BLOOD PRESSURE: 68 MMHG | TEMPERATURE: 97.6 F | HEIGHT: 64 IN | WEIGHT: 195 LBS | RESPIRATION RATE: 16 BRPM | OXYGEN SATURATION: 90 % | SYSTOLIC BLOOD PRESSURE: 138 MMHG

## 2019-07-24 DIAGNOSIS — I48.19 PERSISTENT ATRIAL FIBRILLATION (HCC): ICD-10-CM

## 2019-07-24 DIAGNOSIS — M48.00 SPINAL STENOSIS, MULTILEVEL: ICD-10-CM

## 2019-07-24 DIAGNOSIS — R32 URINARY INCONTINENCE, UNSPECIFIED TYPE: ICD-10-CM

## 2019-07-24 DIAGNOSIS — M32.9 SYSTEMIC LUPUS ERYTHEMATOSUS, UNSPECIFIED SLE TYPE, UNSPECIFIED ORGAN INVOLVEMENT STATUS (HCC): ICD-10-CM

## 2019-07-24 DIAGNOSIS — L98.421 SACRAL ULCER, LIMITED TO BREAKDOWN OF SKIN (HCC): ICD-10-CM

## 2019-07-24 DIAGNOSIS — F32.0 CURRENT MILD EPISODE OF MAJOR DEPRESSIVE DISORDER WITHOUT PRIOR EPISODE (HCC): ICD-10-CM

## 2019-07-24 DIAGNOSIS — F32.0 MAJOR DEPRESSIVE DISORDER, SINGLE EPISODE, MILD (HCC): ICD-10-CM

## 2019-07-24 DIAGNOSIS — Z95.0 CARDIAC PACEMAKER IN SITU: ICD-10-CM

## 2019-07-24 DIAGNOSIS — I49.5 SICK SINUS SYNDROME (HCC): ICD-10-CM

## 2019-07-24 PROCEDURE — 8041 PR SCP AHA: Performed by: FAMILY MEDICINE

## 2019-07-24 PROCEDURE — 99214 OFFICE O/P EST MOD 30 MIN: CPT | Performed by: FAMILY MEDICINE

## 2019-07-24 RX ORDER — MAGNESIUM OXIDE 400 MG/1
TABLET ORAL
COMMUNITY
Start: 2019-05-18 | End: 2019-10-22

## 2019-07-24 NOTE — PROGRESS NOTES
Subjective:     Donte Magaña is a 78 y.o. female here today for  Annual Health Assessment.    Patient comes in with several issues.  She is frustrated with urinary incontinence.  She is seeing Dr. Castillo who is not been able to help her.  I did refer her to Dr. Hardin last year for this problem but she did not carry through with that referral.  I am going to redo that referral because he specializes in female urinary problems.  Patient has no chest pains no shortness of breath.  She has not noted an irregular heartbeat.  She says that the sertraline 50 mg p.o. daily is helping with her depression.  She has a past history of an positive DEN but we checked it back in 2012 and it was negative.  Lupus is not active at this point.  She has a sore on her upper buttocks that she would like me to check.  It has been been putting Goldbond on this.    Health Maintenance Summary                Annual Wellness Visit Overdue 1940     IMM ZOSTER VACCINES Overdue 11/16/1988     MAMMOGRAM Overdue 2/5/2019      Done 2/5/2018 MA-MAMMO SCREENING BILAT W/TOMOSYNTHESIS W/CAD     Patient has more history with this topic...    IMM INFLUENZA Next Due 9/1/2019      Done 10/12/2018 Imm Admin: Influenza Vaccine Adult HD     Patient has more history with this topic...    IMM DTaP/Tdap/Td Vaccine Next Due 5/15/2022      Done 5/15/2012 Imm Admin: Tdap Vaccine     Patient has more history with this topic...    BONE DENSITY Next Due 8/9/2022      Done 8/9/2017 DS-BONE DENSITY STUDY (DEXA)     Patient has more history with this topic...           Annual Health Assessment Questions:     1.  Are you currently engaging in any exercise or physical activity? Yes    2.  How would you describe your mood or emotional well-being today? fair    3.  Have you had any falls in the last year? No    4.  Have you noticed any problems with your balance or had difficulty walking? Yes    5.  In the last six months have you experienced any leakage of  urine? Yes    6. DPA/Advanced Directive: Patient does not have an Advanced Directive.  A packet and workshop information was given on Advanced Directives.    Current medicines (including changes today)  Current Outpatient Prescriptions   Medication Sig Dispense Refill   • ipratropium (ATROVENT) 0.03 % Solution SPRAY 2 SPRAYS IN NOSE EVERY 12 HOURS. 30 mL 3   • levothyroxine (SYNTHROID) 50 MCG Tab Take 1 Tab by mouth every morning. ON A EMPTY STOMACH 90 Tab 3   • ciclopirox (PENLAC) 8 % solution Apply to toenails with fungus every night. Remove from nails once a week with rubbing alcohol. 6.6 mL 3   • lisinopril (PRINIVIL) 5 MG Tab Take 1 Tab by mouth every day. 100 Tab 1   • raNITidine (ZANTAC) 150 MG Tab TAKE ONE TABLET BY MOUTH TWICE DAILY 180 Tab 3   • warfarin (COUMADIN) 2.5 MG Tab Take 1-1.5 Tabs by mouth every day. As directed by the [facility] 135 Tab 2   • atenolol (TENORMIN) 50 MG Tab Take 1 Tab by mouth 2 times a day. 180 Tab 3   • furosemide (LASIX) 40 MG Tab Take 1 Tab by mouth every day. 90 Tab 3   • nystatin (MYCOSTATIN) powder Apply up to 4 times a day to groin rash after washing with mild soap and water 30 g 1   • Misc. Devices Misc Dispense test strips to be used with home INR testing for warfarin anticoagulation dose regulation 100 Strip 2   • sennosides (SENOKOT) 8.6 MG Tab Take 17.2 mg by mouth every day.     • Alpha-D-Galactosidase (BEANO) Tab Take 2 tablet by mouth as needed. swallow or chew 2 tablets.     • Acetaminophen 500 MG Cap Take 2 Caps by mouth 3 times a day.     • oxyCODONE immediate release (ROXICODONE) 10 MG immediate release tablet Take 10 mg by mouth 2 times a day as needed for Severe Pain.     • estradiol (ESTRACE) 2 MG Tab TAKE ONE TABLET BY MOUTH EVERY DAY 90 Tab 3   • spironolactone (ALDACTONE) 50 MG Tab TAKE  ONE TABLET BY MOUTH EVERY MORNING AND EVERY EVENING 180 Tab 3   • magnesium oxide (MAG-OX) 400 (241.3 Mg) MG Tab tablet Take 1 Tab by mouth every day.  (Patient taking differently: Take 1 Tab by mouth every day. Pt instructed to take 200mg not 400mg) 30 Tab 6   • PREBIOTIC PRODUCT PO Take 2 Tabs by mouth every day.     • sertraline (ZOLOFT) 50 MG Tab Take 1 Tab by mouth every day. 90 Tab 3   • Lutein 20 MG Cap Take 1 Cap by mouth every day. 90 Cap 3   • vitamin D (CHOLECALCIFEROL) 1000 UNIT TABS Take 2,000 Units by mouth every day.     • magnesium oxide (MAG-OX) 400 MG Tab      • bacitracin-polymyxin b (POLYSPORIN) 500-16141 UNIT/GM Ointment Apply twice a day to infected sores on skin. 15 g 1     No current facility-administered medications for this visit.        She  has a past medical history of A-fib ([facility]); Anesthesia; Anticoagulant long-term use (1/12/2012); Arthritis; Asthma; Atrial fibrillation ([facility]); Backpain; Bowel habit changes; Breath shortness; Bronchitis (Nov, 2013); CAD (coronary artery disease); Depression; Glaucoma (5/3/2011); Hematoma complicating a procedure (11/3/2012); Hemorrhagic disorder ([facility]); High cholesterol; Hypertension; Hypothyroid; Lupus ([facility]); Macular degeneration; Menopause (1/12/2012); Mitral regurgitation (10/30/2012); Obesity (1/12/2012); Pacemaker (2018); Pneumonia (feb,2013); Pre-syncope (6/29/2018); Pulmonary hypertension ([facility]) (10/30/2012); PVC's (premature ventricular contractions) (1/12/2012); Senile nuclear sclerosis; Spinal stenosis of lumbar region at multiple levels; Unspecified cataract; and Unspecified urinary incontinence.    Amiodarone; Bactrim; Cipro xr; Metoprolol; Morphine; Phytoplex z-guard [petrolatum-zinc oxide]; Pseudoephedrine; Qvar [beclomethasone dipropionate]; Vibramycin; Atorvastatin calcium-polysorbate 80; Augmentin; Diltiazem; Flecainide; Keflex; Mucinex; Tramadol; Atorvastatin; and Tape    She  reports that she has never smoked. She has never used smokeless tobacco. She reports that she does not drink alcohol or use drugs.  Counseling given: Not Answered      ROS   No chest pain, no shortness of breath,  "no abdominal pain.      Review of Systems   Constitutional: Negative.  Negative for fever, chills, weight loss and malaise/fatigue.   HENT: Negative for hearing loss, ear pain, congestion, sore throat, neck pain and tinnitus.    Eyes: Negative for blurred vision, double vision and pain.   Respiratory: Negative for cough, hemoptysis, shortness of breath and wheezing.    Cardiovascular: Negative for chest pain, palpitations, orthopnea, claudication, leg swelling and PND.   Gastrointestinal: Negative for heartburn, nausea, vomiting, abdominal pain, diarrhea, constipation, blood in stool and melena.   Genitourinary: Positive for incontinence, negative for dysuria, positive for urgency, positive for frequency and negative for hematuria. Female-- Negative for pelvic pain, vaginal discharge, or abnormal bleeding. No breast lumps, or masses, nipple bleeding or discharge. Musculoskeletal: Negative for myalgias, back pain and joint pain.   Skin: Negative for rash and itching.  Nonhealing sore on upper mid buttocks  Neurological: Negative for dizziness, tingling, tremors, focal weakness, seizures, loss of consciousness and headaches.   Endo/Heme/Allergies: Negative for environmental allergies and polydipsia.  Does not bruise/bleed easily.   Psychiatric/Behavioral: Positive for depression, negative for memory loss and substance abuse.  The patient is not nervous/anxious and does not have insomnia.  All others negative.     Objective:     Physical Exam:  /68 (BP Location: Right arm, Patient Position: Sitting, BP Cuff Size: Adult)   Pulse 99   Temp 36.4 °C (97.6 °F) (Temporal)   Resp 16   Ht 1.626 m (5' 4\")   Wt 88.5 kg (195 lb)   SpO2 90%  Body mass index is 33.47 kg/m².     Physical Exam   Constitutional: She is oriented. She appears well-developed and well-nourished. No distress.   HENT:  Head: Normocephalic and atraumatic.   Right Ear: External ear normal. Ear canal and TM normal   Left Ear: External ear normal. " Ear canal and TM normal  Nose: Nose normal.   Mouth/Throat: Oropharynx is clear and moist.   Eyes: Conjunctivae and extraocular motions are normal. Pupils are equal, round, and reactive to light. Fundi benign bilaterally   Neck: No thyromegaly present.   Cardiovascular: Normal rate, regular rhythm, normal heart sounds and intact distal pulses.  Exam reveals no gallop.  She is not fibrillating.  No murmur heard.  Pulmonary/Chest: Effort normal and breath sounds normal. No respiratory distress. She has no wheezes. She has no rales.   Abdominal: Soft. Bowel sounds are normal. No hepatosplenomegaly She exhibits no distension. No tenderness. She has no rebound and no guarding.   Musculoskeletal: Normal range of motion. She exhibits no edema and no tenderness.   Lymphadenopathy:     She has no cervical adenopathy.   No supraclavicular adenopathy  Neurological: She is alert and oriented. She has normal reflexes.        Babinskis downgoing bilaterally   Skin: She has 2 small ulcers in her upper buttocks area which face each other.  They look better than they have when I seen them in the past.  They do not appear infected.  I think that local care with a light amount of A&E ointment and Goldbond will be helpful in healing these.  I offered to refer her to wound care which she wants to try this above approach first.  Skin is warm and dry. No rash noted. No erythema.   Psychiatric: She has a normal mood and appropriate affect. Her behavior is normal. Judgment and thought content normal.    Urine culture from Dr. Castillo's office is negative for infection.    Assessment and Plan:     1. Urinary incontinence, unspecified type  REFERRAL TO GYNECOLOGY--Dr. John Hardin   2. Persistent atrial fibrillation (HCC)   doing well   3. Major depressive disorder, single episode, mild (HCC)   doing well on sertraline 50 mg p.o. daily continue this.   4. Sick sinus syndrome (HCC)   doing well with cardiac pacemaker   5. Cardiac pacemaker  in situ   doing well.   6. Spinal stenosis, multilevel   patient is seeing Dr. Clement for this.   7. Sacral ulcer, limited to breakdown of skin (HCC)   I recommended a light application of and D ointment followed by Roseann which her  will apply   8. Systemic lupus erythematosus, unspecified SLE type, unspecified organ involvement status (HCC)   this is not an active problem at this time.         Discussion today about general wellness and lifestyle habits:    · Engage in regular physical activity and social activities.  · Prevent falls and reduce trip hazards; using ambulatory aides, hearing and vision testing if appropriate.  · Steps to improve urinary incontinence.  · Advanced care planning.    Follow-Up: Recheck 3 months or as needed         PLEASE NOTE: This dictation was created using voice recognition software. I have made every reasonable attempt to correct obvious errors, but I expect that there are errors of grammar and possibly content that I did not discover before finalizing the note.

## 2019-07-24 NOTE — LETTER
[facility]  Kishore Price M.D.  [address]  [place] [address]  Fax: [phone]   Authorization for Release/Disclosure of   Protected Health Information   Name: STEPHANIE MAGAÑA : 1940 SSN: [id number]   Address: [facility] [place] [address] Phone:    [phone] (home)    I authorize the entity listed below to release/disclose the PHI below to:   [facility]/Kishore Price M.D. and Kishore Price M.D.   Provider or Entity Name:  [facility]/Dr Mcknight    Address   City, State, Zip   Phone:      Fax:   [phone]   Reason for request: continuity of care   Information to be released:    [  ] LAST COLONOSCOPY,  including any PATH REPORT and follow-up  [  ] LAST FIT/COLOGUARD RESULT [  ] LAST DEXA  [  ] LAST MAMMOGRAM  [  ] LAST PAP  [  ] LAST LABS [  ] RETINA EXAM REPORT  [  ] IMMUNIZATION RECORDS  [XX  ] Release all info      [  ] Check here and initial the line next to each item to release ALL health information INCLUDING  _____ Care and treatment for drug and / or alcohol abuse  _____ HIV testing, infection status, or AIDS  _____ Genetic Testing    DATES OF SERVICE OR TIME PERIOD TO BE DISCLOSED: _____________  I understand and acknowledge that:  * This Authorization may be revoked at any time by you in writing, except if your health information has already been used or disclosed.  * Your health information that will be used or disclosed as a result of you signing this authorization could be re-disclosed by the recipient. If this occurs, your re-disclosed health information may no longer be protected by State or Federal laws.  * You may refuse to sign this Authorization. Your refusal will not affect your ability to obtain treatment.  * This Authorization becomes effective upon signing and will  on (date) __________.      If no date is indicated, this Authorization will  one (1) year from the signature date.    Name: Stephanie Magaña    Signature:   Date:     7/24/2019       PLEASE FAX REQUESTED RECORDS BACK TO: [phone]

## 2019-08-05 ENCOUNTER — ANTICOAGULATION MONITORING (OUTPATIENT)
Dept: VASCULAR LAB | Facility: MEDICAL CENTER | Age: 79
End: 2019-08-05

## 2019-08-05 ENCOUNTER — ANTICOAGULATION MONITORING (OUTPATIENT)
Dept: MEDICAL GROUP | Facility: PHYSICIAN GROUP | Age: 79
End: 2019-08-05

## 2019-08-05 DIAGNOSIS — Z79.01 CHRONIC ANTICOAGULATION: ICD-10-CM

## 2019-08-05 LAB — INR PPP: 2.1 (ref 2–3.5)

## 2019-08-05 NOTE — PROGRESS NOTES
Anticoagulation Summary  As of 2019    INR goal:   2.0-3.0   TTR:   72.7 % (4 y)   INR used for dosin.10 (2019)   Warfarin maintenance plan:   3.75 mg (2.5 mg x 1.5) every Mon, Wed, Fri; 2.5 mg (2.5 mg x 1) all other days   Weekly warfarin total:   21.25 mg   No change documented:   Sameer Guzman, Med Ass't   Plan last modified:   Ivone Barraza, PharmD (2019)   Next INR check:   2019   Target end date:   Indefinite    Indications    Atrial fibrillation (HCC) (Resolved) [I48.91]  Chronic anticoagulation [Z79.01]             Anticoagulation Episode Summary     INR check location:   Home Draw    Preferred lab:       Send INR reminders to:       Comments:   Winston YEAGER      Anticoagulation Care Providers     Provider Role Specialty Phone number    Lizzy Haywood M.D. Referring Cardiology [phone]    [facility] Responsible  [phone]    Vladimir Taylor, PharmD Responsible          Anticoagulation Patient Findings  Patient Findings     Negatives:   Signs/symptoms of thrombosis, Signs/symptoms of bleeding, Laboratory test error suspected, Change in health, Change in alcohol use, Change in activity, Upcoming invasive procedure, Emergency department visit, Upcoming dental procedure, Missed doses, Extra doses, Change in medications, Change in diet/appetite, Hospital admission, Bruising, Other complaints        Spoke with patient to report a therapeutic INR.  Pt instructed to continue with current warfarin dosing regimen. Pt denies any s/s of bleeding, bruising, clotting or any changes to diet or medication.  Will follow up in 2 weeks.    Sameer Guzman, Med Ass't      I have reviewed and concur with the above plan     Fran Leyva, PeterD

## 2019-08-08 ENCOUNTER — ANTICOAGULATION MONITORING (OUTPATIENT)
Dept: VASCULAR LAB | Facility: MEDICAL CENTER | Age: 79
End: 2019-08-08

## 2019-08-08 DIAGNOSIS — Z79.01 CHRONIC ANTICOAGULATION: ICD-10-CM

## 2019-08-19 ENCOUNTER — ANTICOAGULATION MONITORING (OUTPATIENT)
Dept: VASCULAR LAB | Facility: MEDICAL CENTER | Age: 79
End: 2019-08-19

## 2019-08-19 DIAGNOSIS — Z79.01 CHRONIC ANTICOAGULATION: ICD-10-CM

## 2019-08-19 LAB — INR PPP: 2.2 (ref 2–3.5)

## 2019-08-19 NOTE — PROGRESS NOTES
Anticoagulation Summary  As of 2019    INR goal:   2.0-3.0   TTR:   73.0 % (4.1 y)   INR used for dosin.20 (2019)   Warfarin maintenance plan:   3.75 mg (2.5 mg x 1.5) every Mon, Wed, Fri; 2.5 mg (2.5 mg x 1) all other days   Weekly warfarin total:   21.25 mg   Plan last modified:   Peter MartinD (2019)   Next INR check:   2019   Target end date:   Indefinite    Indications    Atrial fibrillation (HCC) (Resolved) [I48.91]  Chronic anticoagulation [Z79.01]             Anticoagulation Episode Summary     INR check location:   Home Draw    Preferred lab:       Send INR reminders to:       Comments:   Winston YEAGER      Anticoagulation Care Providers     Provider Role Specialty Phone number    Lizzy Haywood M.D. Referring Cardiology [phone]    [facility] Responsible  [phone]    Peter HollowayD Responsible          Anticoagulation Patient Findings    Spoke with patients  today regarding therapeutic INR of 2.2.  Patient denies any signs/symptoms of bruising or bleeding or any changes in diet and medications.  Instructed patient to call clinic with any questions or concerns.  Pt is to continue with current warfarin dosing regimen.  Follow up in 2 weeks, to reduce risk of adverse events related to this high risk medication,  Warfarin.    Vladimir Taylor, PharmD, BCACP

## 2019-08-20 ENCOUNTER — TELEPHONE (OUTPATIENT)
Dept: RADIOLOGY | Facility: MEDICAL CENTER | Age: 79
End: 2019-08-20

## 2019-08-20 ENCOUNTER — TELEPHONE (OUTPATIENT)
Dept: MEDICAL GROUP | Facility: PHYSICIAN GROUP | Age: 79
End: 2019-08-20

## 2019-08-20 ENCOUNTER — OFFICE VISIT (OUTPATIENT)
Dept: MEDICAL GROUP | Facility: PHYSICIAN GROUP | Age: 79
End: 2019-08-20
Payer: MEDICARE

## 2019-08-20 VITALS
SYSTOLIC BLOOD PRESSURE: 122 MMHG | TEMPERATURE: 97.3 F | HEART RATE: 110 BPM | HEIGHT: 64 IN | OXYGEN SATURATION: 92 % | DIASTOLIC BLOOD PRESSURE: 78 MMHG | RESPIRATION RATE: 16 BRPM | WEIGHT: 197 LBS | BODY MASS INDEX: 33.63 KG/M2

## 2019-08-20 DIAGNOSIS — M48.00 SPINAL STENOSIS, MULTILEVEL: ICD-10-CM

## 2019-08-20 DIAGNOSIS — Z95.0 CARDIAC PACEMAKER IN SITU: ICD-10-CM

## 2019-08-20 DIAGNOSIS — I48.19 PERSISTENT ATRIAL FIBRILLATION (HCC): ICD-10-CM

## 2019-08-20 DIAGNOSIS — I25.10 CORONARY ARTERY DISEASE INVOLVING NATIVE CORONARY ARTERY OF NATIVE HEART WITHOUT ANGINA PECTORIS: ICD-10-CM

## 2019-08-20 PROCEDURE — 93000 ELECTROCARDIOGRAM COMPLETE: CPT | Performed by: FAMILY MEDICINE

## 2019-08-20 PROCEDURE — 99213 OFFICE O/P EST LOW 20 MIN: CPT | Performed by: FAMILY MEDICINE

## 2019-08-20 RX ORDER — ESTRADIOL 0.1 MG/G
CREAM VAGINAL
COMMUNITY
Start: 2019-08-15 | End: 2020-06-04

## 2019-08-20 NOTE — TELEPHONE ENCOUNTER
1. Caller Name: Donte Magaña                                         Call Back Number: [phone] (home)       Patient approves a detailed voicemail message: N\A    2. SPECIFIC Action To Be Taken: Orders pending, please sign. Orders are for MRI Clearance scheduled on 08/26/19    3. Diagnosis/Clinical Reason for Request: CBC, CMP, and EKG at [facility]    4. Specialty & Provider Name/Lab/Imaging Location: [facility]    5. Is appointment scheduled for requested order/referral: yes - MRI  On Monday    Patient was informed they will receive a return phone call from the office ONLY if there are any questions before processing their request. Advised to call back if they haven't received a call from the referral department in 5 days.

## 2019-08-21 NOTE — PROGRESS NOTES
"Patient comes in.  Her back pain is getting worse.  She is seeing Dr. Clement and is going to be having an MRI done of her back but she is very anxious and claustrophobic so this MRI is going to be done under sedation.  She needs to have lab work and a EKG to be cleared to have this test done under sedation.  She has no chest pains no shortness of breath.    I reviewed the following    Past Medical History:   Diagnosis Date   • A-fib (HCC)    • Anesthesia     \"Tachycardia for 5 days after cataract surgery\"   • Anticoagulant long-term use 1/12/2012   • Arthritis     Knees, hips   • Asthma     with pneumonia, nothing for 3 years   • Atrial fibrillation (HCC)    • Backpain     R hip   • Bowel habit changes     diarrhea   • Breath shortness     with exertion O2  2l/m PRN, hasnt used for 3 years   • Bronchitis Nov, 2013   • CAD (coronary artery disease)    • Depression    • Glaucoma 5/3/2011   • Hematoma complicating a procedure 11/3/2012   • Hemorrhagic disorder ([facility])     bruising/coumadin   • High cholesterol    • Hypertension    • Hypothyroid    • Lupus (HCC)    • Macular degeneration    • Menopause 1/12/2012   • Mitral regurgitation 10/30/2012   • Obesity 1/12/2012   • Pacemaker 2018   • Pneumonia feb,2013   • Pre-syncope 6/29/2018   • Pulmonary hypertension (HCC) 10/30/2012   • PVC's (premature ventricular contractions) 1/12/2012   • Senile nuclear sclerosis    • Spinal stenosis of lumbar region at multiple levels    • Unspecified cataract     repaired bilateral   • Unspecified urinary incontinence         Past Surgical History:   Procedure Laterality Date   • IRRIGATION & DEBRIDEMENT ORTHO Right 2/14/2019    Procedure: IRRIGATION & DEBRIDEMENT ORTHO-HIP WOUND ;  Surgeon: Vladimir Lee M.D.;  Location: SURGERY [facility];  Service: Orthopedics   • HIP ARTH ANTERIOR TOTAL Right 1/17/2019    Procedure: HIP ARTHROPLASTY ANTERIOR TOTAL;  Surgeon: Juan C Mercedes M.D.;  Location: SURGERY [facility];  " "Service: Orthopedics   • PACEMAKER INSERTION  06/30/2018    Dual Chamber   • KNEE ARTHROPLASTY TOTAL Right 6/23/2016    Procedure: KNEE ARTHROPLASTY TOTAL;  Surgeon: Heriberto Lozada M.D.;  Location: SURGERY [facility];  Service:    • KNEE ARTHROPLASTY TOTAL Left 5/28/2015    Procedure: KNEE ARTHROPLASTY TOTAL;  Surgeon: Heriberto Lozada M.D.;  Location: SURGERY [facility];  Service:    • CATARACT PHACO WITH IOL Right 5/5/2015    Procedure: IOL OD - STANDARD;  Surgeon: Dmitry Bejarano M.D.;  Location: [facility];  Service:    • CATARACT PHACO WITH IOL  4/21/2015    Performed by Dmitry Bejarano M.D. at [facility]   • RECOVERY  11/30/2010    Performed by SURGERY, [facility]RECOVERY at [facility] SAME DAY [facility] ORS   • COLONOSCOPY  2008    Normal    [facility]   • ABDOMINAL HYSTERECTOMY TOTAL  April 15,1975    still has ovaries   • OPEN REDUCTION      left ankle   • OTHER      Removed pins from left ankle   • OTHER CARDIAC SURGERY  12/2017 and 07/19/2018     Cardiac Ablation   • TONSILLECTOMY AND ADENOIDECTOMY         Allergies   Allergen Reactions   • Amiodarone Hives     Throat and tongue itching   • Bactrim Shortness of Breath   • Cipro Xr Swelling   • Metoprolol Swelling     Causes throat swelling   • Morphine Unspecified     Hallucinations   • Phytoplex Z-Guard [Petrolatum-Zinc Oxide] Unspecified     \"causes burning\"   • Pseudoephedrine Palpitations   • Qvar [Beclomethasone Dipropionate] Unspecified     Pressure on heart     • Vibramycin Shortness of Breath   • Atorvastatin Calcium-Polysorbate 80 Unspecified     Muscle aches     • Augmentin Unspecified     Unknown reaction   • Diltiazem Rash     rash   • Flecainide Unspecified     dizziness   • Keflex Unspecified     Pt states \"Unsure\".   • Mucinex Unspecified     GI Distress     • Tramadol Unspecified     crying   • Atorvastatin Myalgia   • Tape Rash     Paper tape okay       Current Outpatient Medications   Medication Sig " Dispense Refill   • estradiol (ESTRACE) 0.1 MG/GM vaginal cream      • ipratropium (ATROVENT) 0.03 % Solution SPRAY 2 SPRAYS IN NOSE EVERY 12 HOURS. 30 mL 3   • bacitracin-polymyxin b (POLYSPORIN) 500-57904 UNIT/GM Ointment Apply twice a day to infected sores on skin. 15 g 1   • ciclopirox (PENLAC) 8 % solution Apply to toenails with fungus every night. Remove from nails once a week with rubbing alcohol. 6.6 mL 3   • lisinopril (PRINIVIL) 5 MG Tab Take 1 Tab by mouth every day. 100 Tab 1   • raNITidine (ZANTAC) 150 MG Tab TAKE ONE TABLET BY MOUTH TWICE DAILY 180 Tab 3   • atenolol (TENORMIN) 50 MG Tab Take 1 Tab by mouth 2 times a day. 180 Tab 3   • nystatin (MYCOSTATIN) powder Apply up to 4 times a day to groin rash after washing with mild soap and water 30 g 1   • sennosides (SENOKOT) 8.6 MG Tab Take 17.2 mg by mouth every day.     • Alpha-D-Galactosidase (BEANO) Tab Take 2 tablet by mouth as needed. swallow or chew 2 tablets.     • Acetaminophen 500 MG Cap Take 2 Caps by mouth 3 times a day.     • estradiol (ESTRACE) 2 MG Tab TAKE ONE TABLET BY MOUTH EVERY DAY 90 Tab 3   • spironolactone (ALDACTONE) 50 MG Tab TAKE  ONE TABLET BY MOUTH EVERY MORNING AND EVERY EVENING 180 Tab 3   • magnesium oxide (MAG-OX) 400 (241.3 Mg) MG Tab tablet Take 1 Tab by mouth every day. (Patient taking differently: Take 1 Tab by mouth every day. Pt instructed to take 200mg not 400mg) 30 Tab 6   • PREBIOTIC PRODUCT PO Take 2 Tabs by mouth every day.     • sertraline (ZOLOFT) 50 MG Tab Take 1 Tab by mouth every day. 90 Tab 3   • Lutein 20 MG Cap Take 1 Cap by mouth every day. 90 Cap 3   • vitamin D (CHOLECALCIFEROL) 1000 UNIT TABS Take 2,000 Units by mouth every day.     • magnesium oxide (MAG-OX) 400 MG Tab      • levothyroxine (SYNTHROID) 50 MCG Tab Take 1 Tab by mouth every morning. ON A EMPTY STOMACH 90 Tab 3   • warfarin (COUMADIN) 2.5 MG Tab Take 1-1.5 Tabs by mouth every day. As directed by the [facility] 135 Tab  2   • Misc. Devices Misc Dispense test strips to be used with home INR testing for warfarin anticoagulation dose regulation 100 Strip 2   • oxyCODONE immediate release (ROXICODONE) 10 MG immediate release tablet Take 10 mg by mouth 2 times a day as needed for Severe Pain.       No current facility-administered medications for this visit.         Family History   Problem Relation Age of Onset   • Stroke Mother    • Diabetes Father    • Stroke Sister    • Heart Disease Brother    • Stroke Sister    • GI Disease Daughter         Crohn's Disease   • Heart Disease Daughter         CHF   • Other Daughter         Chronic Pain--Lymphedema   • Cancer Paternal Aunt        Social History     Socioeconomic History   • Marital status:      Spouse name: Not on file   • Number of children: Not on file   • Years of education: Not on file   • Highest education level: Not on file   Occupational History   • Not on file   Social Needs   • Financial resource strain: Not on file   • Food insecurity:     Worry: Not on file     Inability: Not on file   • Transportation needs:     Medical: Not on file     Non-medical: Not on file   Tobacco Use   • Smoking status: Never Smoker   • Smokeless tobacco: Never Used   Substance and Sexual Activity   • Alcohol use: No   • Drug use: No   • Sexual activity: Not Currently     Partners: Male     Birth control/protection: Post-Menopausal   Lifestyle   • Physical activity:     Days per week: Not on file     Minutes per session: Not on file   • Stress: Not on file   Relationships   • Social connections:     Talks on phone: Not on file     Gets together: Not on file     Attends Protestant service: Not on file     Active member of club or organization: Not on file     Attends meetings of clubs or organizations: Not on file     Relationship status: Not on file   • Intimate partner violence:     Fear of current or ex partner: Not on file     Emotionally abused: Not on file     Physically abused: Not on  file     Forced sexual activity: Not on file   Other Topics Concern   • Not on file   Social History Narrative   • Not on file      The patient is well-developed well-nourished and in no acute distress    Pupils equally round and reactive to light    Ears normal    Nose normal    Throat clear    Neck supple no cervical adenopathy no thyromegaly    Chest clear to auscultation    Heart regular rhythm no murmur S3 or S4 appreciated    Back 2+ tender in the L1-3 paraspinous muscles.    Abdomen flat soft good bowel sounds no mass No hepatosplenomegaly no rebound    Skin no rashes or suspicious lesions    DTRs 2+ in ankles knees and biceps    Babinski downgoing bilaterally    Pulses somewhat decreased in her feet bilaterally.  Patient has intact sensation to light touch in her feet    EKG shows a dual paced rhythm in both the atria and the ventricles this EKG looks stable when I compared to one from April of this year.    1. Spinal stenosis, multilevel   I think the patient can proceed with the MRI under sedation   2. Cardiac pacemaker in situ   patient's EKG looks stable when compared to April of this year.  Patient is reassured       Recheck with me as needed    Please note that this dictation was created using voice recognition software. I have worked with consultants from the vendor as well as technical experts from [facility] to optimize the interface. I have made every reasonable attempt to correct obvious errors, but I expect that there are errors of grammar and possibly content that I did not discover before finalizing the note.

## 2019-08-22 ENCOUNTER — HOSPITAL ENCOUNTER (OUTPATIENT)
Dept: LAB | Facility: MEDICAL CENTER | Age: 79
End: 2019-08-22
Attending: FAMILY MEDICINE
Payer: MEDICARE

## 2019-08-22 DIAGNOSIS — Z95.0 CARDIAC PACEMAKER IN SITU: ICD-10-CM

## 2019-08-22 DIAGNOSIS — I48.19 PERSISTENT ATRIAL FIBRILLATION (HCC): ICD-10-CM

## 2019-08-22 DIAGNOSIS — I25.10 CORONARY ARTERY DISEASE INVOLVING NATIVE CORONARY ARTERY OF NATIVE HEART WITHOUT ANGINA PECTORIS: ICD-10-CM

## 2019-08-22 LAB
ALBUMIN SERPL BCP-MCNC: 4 G/DL (ref 3.2–4.9)
ALBUMIN/GLOB SERPL: 1.3 G/DL
ALP SERPL-CCNC: 74 U/L (ref 30–99)
ALT SERPL-CCNC: 8 U/L (ref 2–50)
ANION GAP SERPL CALC-SCNC: 8 MMOL/L (ref 0–11.9)
AST SERPL-CCNC: 14 U/L (ref 12–45)
BASOPHILS # BLD AUTO: 1 % (ref 0–1.8)
BASOPHILS # BLD: 0.09 K/UL (ref 0–0.12)
BILIRUB SERPL-MCNC: 0.4 MG/DL (ref 0.1–1.5)
BUN SERPL-MCNC: 26 MG/DL (ref 8–22)
CALCIUM SERPL-MCNC: 9.7 MG/DL (ref 8.5–10.5)
CHLORIDE SERPL-SCNC: 104 MMOL/L (ref 96–112)
CO2 SERPL-SCNC: 24 MMOL/L (ref 20–33)
CREAT SERPL-MCNC: 1.02 MG/DL (ref 0.5–1.4)
EOSINOPHIL # BLD AUTO: 0.28 K/UL (ref 0–0.51)
EOSINOPHIL NFR BLD: 3.1 % (ref 0–6.9)
ERYTHROCYTE [DISTWIDTH] IN BLOOD BY AUTOMATED COUNT: 51.8 FL (ref 35.9–50)
GLOBULIN SER CALC-MCNC: 3 G/DL (ref 1.9–3.5)
GLUCOSE SERPL-MCNC: 104 MG/DL (ref 65–99)
HCT VFR BLD AUTO: 46.6 % (ref 37–47)
HGB BLD-MCNC: 15.1 G/DL (ref 12–16)
IMM GRANULOCYTES # BLD AUTO: 0.05 K/UL (ref 0–0.11)
IMM GRANULOCYTES NFR BLD AUTO: 0.5 % (ref 0–0.9)
LYMPHOCYTES # BLD AUTO: 3.07 K/UL (ref 1–4.8)
LYMPHOCYTES NFR BLD: 33.7 % (ref 22–41)
MCH RBC QN AUTO: 33.4 PG (ref 27–33)
MCHC RBC AUTO-ENTMCNC: 32.4 G/DL (ref 33.6–35)
MCV RBC AUTO: 103.1 FL (ref 81.4–97.8)
MONOCYTES # BLD AUTO: 0.72 K/UL (ref 0–0.85)
MONOCYTES NFR BLD AUTO: 7.9 % (ref 0–13.4)
NEUTROPHILS # BLD AUTO: 4.89 K/UL (ref 2–7.15)
NEUTROPHILS NFR BLD: 53.8 % (ref 44–72)
NRBC # BLD AUTO: 0 K/UL
NRBC BLD-RTO: 0 /100 WBC
PLATELET # BLD AUTO: 254 K/UL (ref 164–446)
PMV BLD AUTO: 10.6 FL (ref 9–12.9)
POTASSIUM SERPL-SCNC: 4.8 MMOL/L (ref 3.6–5.5)
PROT SERPL-MCNC: 7 G/DL (ref 6–8.2)
RBC # BLD AUTO: 4.52 M/UL (ref 4.2–5.4)
SODIUM SERPL-SCNC: 136 MMOL/L (ref 135–145)
WBC # BLD AUTO: 9.1 K/UL (ref 4.8–10.8)

## 2019-08-22 PROCEDURE — 80053 COMPREHEN METABOLIC PANEL: CPT

## 2019-08-22 PROCEDURE — 36415 COLL VENOUS BLD VENIPUNCTURE: CPT

## 2019-08-22 PROCEDURE — 85025 COMPLETE CBC W/AUTO DIFF WBC: CPT

## 2019-08-26 ENCOUNTER — HOSPITAL ENCOUNTER (OUTPATIENT)
Dept: RADIOLOGY | Facility: MEDICAL CENTER | Age: 79
End: 2019-08-26
Attending: NURSE PRACTITIONER
Payer: MEDICARE

## 2019-08-26 VITALS
SYSTOLIC BLOOD PRESSURE: 136 MMHG | WEIGHT: 190 LBS | RESPIRATION RATE: 16 BRPM | BODY MASS INDEX: 32.44 KG/M2 | DIASTOLIC BLOOD PRESSURE: 64 MMHG | HEIGHT: 64 IN | HEART RATE: 80 BPM | TEMPERATURE: 97 F | OXYGEN SATURATION: 95 %

## 2019-08-26 DIAGNOSIS — M48.061 SPINAL STENOSIS, LUMBAR REGION, WITHOUT NEUROGENIC CLAUDICATION: ICD-10-CM

## 2019-08-26 PROCEDURE — 700101 HCHG RX REV CODE 250

## 2019-08-26 PROCEDURE — 72148 MRI LUMBAR SPINE W/O DYE: CPT

## 2019-08-26 PROCEDURE — 700111 HCHG RX REV CODE 636 W/ 250 OVERRIDE (IP)

## 2019-08-26 NOTE — DISCHARGE INSTRUCTIONS
MRI ADULT DISCHARGE INSTRUCTIONS    You have been medicated today for your scan. Please follow the instructions below to ensure your safe recovery. If you have any questions or problems, feel free to call us at [phone] or [phone].     1.   Have someone stay with you to assist you as needed.    2.   Do not drive or operate any mechanical devices.    3.   Do not perform any activity that requires concentration. Make no major decisions over the next 24 hours.     4.   Be careful changing positions from laying down to sitting or standing, as you may become dizzy.     5.   Do not drink alcohol for 48 hours.    6.   There are no restrictions for eating your normal meals. Drink fluids.    7.   You may continue your usual medications for pain, tranquilizers, muscle relaxants or sedatives when awake.     8.   Tomorrow, you may continue your normal daily activities.     9.   Pressure dressing on 10 - 15 minutes. If swelling or bleeding occurs when removed, continue placing direct pressure on injection site for another 5 minutes, or until bleeding stops.     I have been informed of and understand the above discharge instructions.

## 2019-08-26 NOTE — PROGRESS NOTES
Pt ambulatory to MRI Nurses station, using her walker from home, for scheduled MRI with Anesthesia.  PIV established.  Pt spoke with Dr. Fitzgerald.  Consent signed.  MRI completed.  To recovery.  Tolerated juice.  Discharge instructions given to pt and  with verbalized understanding.  Pt assisted out of the Imaging Department in wheelchair by this RN.  Pt's , Cipriano, to drive her home.

## 2019-09-03 ENCOUNTER — ANTICOAGULATION MONITORING (OUTPATIENT)
Dept: MEDICAL GROUP | Facility: MEDICAL CENTER | Age: 79
End: 2019-09-03

## 2019-09-03 DIAGNOSIS — Z79.01 CHRONIC ANTICOAGULATION: ICD-10-CM

## 2019-09-03 LAB — INR PPP: 2.3 (ref 2–3.5)

## 2019-09-03 NOTE — PROGRESS NOTES
Anticoagulation Summary  As of 9/3/2019    INR goal:   2.0-3.0   TTR:   73.2 % (4.1 y)   INR used for dosin.30 (9/3/2019)   Warfarin maintenance plan:   3.75 mg (2.5 mg x 1.5) every Mon, Wed, Fri; 2.5 mg (2.5 mg x 1) all other days   Weekly warfarin total:   21.25 mg   No change documented:   Sameer DODGE'Scarlett, Med Ass't   Plan last modified:   Ivone Barraza, PharmD (2019)   Next INR check:   2019   Target end date:   Indefinite    Indications    Atrial fibrillation (HCC) (Resolved) [I48.91]  Chronic anticoagulation [Z79.01]             Anticoagulation Episode Summary     INR check location:   Home Draw    Preferred lab:       Send INR reminders to:       Comments:   [place]       Anticoagulation Care Providers     Provider Role Specialty Phone number    Lizzy Haywood M.D. Referring Cardiology [phone]    [facility] Anticoagulation Services Responsible  [phone]    Vladimir Taylor, PharmD Responsible          Anticoagulation Patient Findings  Patient Findings     Negatives:   Signs/symptoms of thrombosis, Signs/symptoms of bleeding, Laboratory test error suspected, Change in health, Change in alcohol use, Change in activity, Upcoming invasive procedure, Emergency department visit, Upcoming dental procedure, Missed doses, Extra doses, Change in medications, Change in diet/appetite, Hospital admission, Bruising, Other complaints        Left voicemail message to report therapeutic INR of 2.3.  Patient to continue with current warfarin dosing regimen. Requested pt contact the clinic for any change to diet or medication, and to report any signs or symptoms of bleeding, bruising or clotting.  Pt to follow up in 2 weeks.    Sameer DODGE'Rayhuang, Med Ass't     I have reviewed and concur with the above plan on 2019.  Massiel Coleman, Clinical Pharmacist, CDE, CACP

## 2019-09-10 ENCOUNTER — HOSPITAL ENCOUNTER (OUTPATIENT)
Dept: LAB | Facility: MEDICAL CENTER | Age: 79
End: 2019-09-10
Attending: OBSTETRICS & GYNECOLOGY
Payer: MEDICARE

## 2019-09-10 LAB
ANION GAP SERPL CALC-SCNC: 9 MMOL/L (ref 0–11.9)
BUN SERPL-MCNC: 26 MG/DL (ref 8–22)
CALCIUM SERPL-MCNC: 9.4 MG/DL (ref 8.5–10.5)
CANCER AG125 SERPL-ACNC: 8.2 U/ML (ref 0–35)
CEA SERPL-MCNC: 1.9 NG/ML (ref 0–3)
CHLORIDE SERPL-SCNC: 106 MMOL/L (ref 96–112)
CO2 SERPL-SCNC: 25 MMOL/L (ref 20–33)
CREAT SERPL-MCNC: 1.03 MG/DL (ref 0.5–1.4)
GLUCOSE SERPL-MCNC: 128 MG/DL (ref 65–99)
POTASSIUM SERPL-SCNC: 4.9 MMOL/L (ref 3.6–5.5)
SODIUM SERPL-SCNC: 140 MMOL/L (ref 135–145)

## 2019-09-10 PROCEDURE — 86304 IMMUNOASSAY TUMOR CA 125: CPT

## 2019-09-10 PROCEDURE — 82378 CARCINOEMBRYONIC ANTIGEN: CPT

## 2019-09-10 PROCEDURE — 36415 COLL VENOUS BLD VENIPUNCTURE: CPT

## 2019-09-10 PROCEDURE — 80048 BASIC METABOLIC PNL TOTAL CA: CPT

## 2019-09-17 ENCOUNTER — ANTICOAGULATION MONITORING (OUTPATIENT)
Dept: VASCULAR LAB | Facility: MEDICAL CENTER | Age: 79
End: 2019-09-17

## 2019-09-17 DIAGNOSIS — Z79.01 CHRONIC ANTICOAGULATION: ICD-10-CM

## 2019-09-17 DIAGNOSIS — L89.309 PRESSURE INJURY OF SKIN OF BUTTOCK, UNSPECIFIED INJURY STAGE, UNSPECIFIED LATERALITY: ICD-10-CM

## 2019-09-17 LAB — INR PPP: 2.7 (ref 2–3.5)

## 2019-09-17 NOTE — PROGRESS NOTES
Anticoagulation Summary  As of 2019    INR goal:   2.0-3.0   TTR:   73.5 % (4.2 y)   INR used for dosin.70 (2019)   Warfarin maintenance plan:   3.75 mg (2.5 mg x 1.5) every Mon, Wed, Fri; 2.5 mg (2.5 mg x 1) all other days   Weekly warfarin total:   21.25 mg   No change documented:   Sameer Guzman, Med Ass't   Plan last modified:   Ivone Barraza, PharmD (2019)   Next INR check:   10/1/2019   Target end date:   Indefinite    Indications    Atrial fibrillation (HCC) (Resolved) [I48.91]  Chronic anticoagulation [Z79.01]             Anticoagulation Episode Summary     INR check location:   Home Draw    Preferred lab:       Send INR reminders to:       Comments:   [place]       Anticoagulation Care Providers     Provider Role Specialty Phone number    Lizzy Haywood M.D. Referring Cardiology [phone]    [facility] Anticoagulation Services Responsible  [phone]    Vladimir Taylor, PharmD Responsible          Anticoagulation Patient Findings  Patient Findings     Negatives:   Signs/symptoms of thrombosis, Signs/symptoms of bleeding, Laboratory test error suspected, Change in health, Change in alcohol use, Change in activity, Upcoming invasive procedure, Emergency department visit, Upcoming dental procedure, Missed doses, Extra doses, Change in medications, Change in diet/appetite, Hospital admission, Bruising, Other complaints        Spoke with patient to report a therapeutic INR.  Pt instructed to continue with current warfarin dosing regimen. Pt denies any s/s of bleeding, bruising, clotting or any changes to diet or medication.  Will follow up in 2 weeks.    Sameer Guzman, Med Ass't      I have reviewed and concur with the above plan on 2019.  Massiel Coleman, Clinical Pharmacist, CDE, CACP

## 2019-09-19 ENCOUNTER — HOSPITAL ENCOUNTER (OUTPATIENT)
Dept: RADIOLOGY | Facility: MEDICAL CENTER | Age: 79
End: 2019-09-19
Attending: OBSTETRICS & GYNECOLOGY
Payer: MEDICARE

## 2019-09-19 DIAGNOSIS — R19.00 ABDOMINAL MASS, UNSPECIFIED ABDOMINAL LOCATION: ICD-10-CM

## 2019-09-19 PROCEDURE — 700117 HCHG RX CONTRAST REV CODE 255: Performed by: OBSTETRICS & GYNECOLOGY

## 2019-09-19 PROCEDURE — 74177 CT ABD & PELVIS W/CONTRAST: CPT

## 2019-09-19 RX ADMIN — IOHEXOL 25 ML: 240 INJECTION, SOLUTION INTRATHECAL; INTRAVASCULAR; INTRAVENOUS; ORAL at 15:30

## 2019-09-19 RX ADMIN — IOHEXOL 100 ML: 350 INJECTION, SOLUTION INTRAVENOUS at 15:30

## 2019-09-26 ENCOUNTER — HOSPITAL ENCOUNTER (OUTPATIENT)
Dept: RADIOLOGY | Facility: MEDICAL CENTER | Age: 79
End: 2019-09-26
Attending: NURSE PRACTITIONER
Payer: MEDICARE

## 2019-09-26 DIAGNOSIS — M41.9 SCOLIOSIS, UNSPECIFIED SCOLIOSIS TYPE, UNSPECIFIED SPINAL REGION: ICD-10-CM

## 2019-09-26 DIAGNOSIS — M48.061 SPINAL STENOSIS, LUMBAR REGION, WITHOUT NEUROGENIC CLAUDICATION: ICD-10-CM

## 2019-09-26 PROCEDURE — 72110 X-RAY EXAM L-2 SPINE 4/>VWS: CPT

## 2019-09-26 PROCEDURE — 72081 X-RAY EXAM ENTIRE SPI 1 VW: CPT

## 2019-10-01 ENCOUNTER — NON-PROVIDER VISIT (OUTPATIENT)
Dept: WOUND CARE | Facility: MEDICAL CENTER | Age: 79
End: 2019-10-01
Attending: FAMILY MEDICINE
Payer: MEDICARE

## 2019-10-01 ENCOUNTER — ANTICOAGULATION VISIT (OUTPATIENT)
Dept: VASCULAR LAB | Facility: MEDICAL CENTER | Age: 79
End: 2019-10-01
Attending: INTERNAL MEDICINE
Payer: MEDICARE

## 2019-10-01 VITALS — HEART RATE: 87 BPM | DIASTOLIC BLOOD PRESSURE: 72 MMHG | SYSTOLIC BLOOD PRESSURE: 149 MMHG

## 2019-10-01 DIAGNOSIS — Z79.01 CHRONIC ANTICOAGULATION: ICD-10-CM

## 2019-10-01 LAB — INR PPP: 2.3 (ref 2–3.5)

## 2019-10-01 PROCEDURE — 99211 OFF/OP EST MAY X REQ PHY/QHP: CPT | Mod: 25,27

## 2019-10-01 PROCEDURE — 99211 OFF/OP EST MAY X REQ PHY/QHP: CPT | Mod: 25

## 2019-10-01 PROCEDURE — 97597 DBRDMT OPN WND 1ST 20 CM/<: CPT

## 2019-10-01 NOTE — PROGRESS NOTES
Anticoagulation Summary  As of 10/1/2019    INR goal:   2.0-3.0   TTR:   73.7 % (4.2 y)   INR used for dosin.30 (10/1/2019)   Warfarin maintenance plan:   3.75 mg (2.5 mg x 1.5) every Mon, Wed, Fri; 2.5 mg (2.5 mg x 1) all other days   Weekly warfarin total:   21.25 mg   Plan last modified:   Ivone Barraza, PharmD (2019)   Next INR check:   10/15/2019   Target end date:   Indefinite    Indications    Atrial fibrillation (HCC) (Resolved) [I48.91]  Chronic anticoagulation [Z79.01]             Anticoagulation Episode Summary     INR check location:   Home Draw    Preferred lab:       Send INR reminders to:       Comments:   [facility]       Anticoagulation Care Providers     Provider Role Specialty Phone number    Lizzy Haywood M.D. Referring Cardiology [phone]    [facility] Anticoagulation Services Responsible  [phone]    Peter HollowayD Responsible          Anticoagulation Patient Findings  Patient Findings     Negatives:   Signs/symptoms of thrombosis, Signs/symptoms of bleeding, Laboratory test error suspected, Change in health, Change in alcohol use, Change in activity, Upcoming invasive procedure, Emergency department visit, Upcoming dental procedure, Missed doses, Extra doses, Change in medications, Change in diet/appetite, Hospital admission, Bruising, Other complaints        Patient came into clinic today, as she was having trouble with her HM which she brought in with her today.   We changed the batteries 2x and it would not power up. Just as we had given up, it turned on and we were able to obtain her INR on her own HM.     HPI:  Donte Magaña seen in clinic today, on anticoagulation therapy with warfarin for AF.  Changes to current medical/health status since last appt: none  Denies signs/symptoms of bleeding and/or thrombosis since the last appt.    Denies any interval changes to diet  Denies any interval changes to medications since last appt.   Denies any complications or  cost restrictions with current therapy.   BP recorded in vitals.  Confirmed current dosing regimen.     Patient's previous INR was therapeutic at 2.7 on 9/17/19, at which time patient was instructed to continue with current warfarin regimen. She returns to clinic today to recheck INR to ensure it is therapeutic and thus preventing possible clotting and/or bleeding/bruising complications.    A/P   INR is therapeutic today at 2.3.  Patient instructed to continue with the current warfarin dosing regimen, and asked to follow up again in 2 weeks via home monitor.     Next appt: Tuesday, Oct 15, 2019     Rayray GreenD

## 2019-10-02 ENCOUNTER — ANTICOAGULATION MONITORING (OUTPATIENT)
Dept: VASCULAR LAB | Facility: MEDICAL CENTER | Age: 79
End: 2019-10-02

## 2019-10-02 DIAGNOSIS — Z79.01 CHRONIC ANTICOAGULATION: ICD-10-CM

## 2019-10-02 LAB — INR PPP: 2.3 (ref 2–3.5)

## 2019-10-08 ENCOUNTER — TELEPHONE (OUTPATIENT)
Dept: VASCULAR LAB | Facility: MEDICAL CENTER | Age: 79
End: 2019-10-08

## 2019-10-08 ENCOUNTER — NON-PROVIDER VISIT (OUTPATIENT)
Dept: WOUND CARE | Facility: MEDICAL CENTER | Age: 79
End: 2019-10-08
Attending: FAMILY MEDICINE
Payer: MEDICARE

## 2019-10-08 PROCEDURE — 97602 WOUND(S) CARE NON-SELECTIVE: CPT

## 2019-10-08 NOTE — TELEPHONE ENCOUNTER
[facility]    Pt has upcoming epidural on 11/1/19.  No hx of stroke, therefore based on Chads score, she does not qualify for bridging.  Pt to hold warfarin 5 days prior to procedure then resume based on hemostasis.    Clearance returned to [facility] and will be scanned into media.     Juan Blanco, PeterD

## 2019-10-08 NOTE — PATIENT INSTRUCTIONS
Should you experience any significant changes in your wound(s) such as infection (redness, swelling, localized heat, increased pain, fever >101 F, chills) or have any questions regarding your home care instructions, please contact the wound center [phone]. If after hours, contact your primary care physician or go the hospital emergency room.  Keep dressing clean and dry and cover while bathing. Only change dressing if over saturated, soiled or its falling off.

## 2019-10-15 ENCOUNTER — ANTICOAGULATION MONITORING (OUTPATIENT)
Dept: VASCULAR LAB | Facility: MEDICAL CENTER | Age: 79
End: 2019-10-15

## 2019-10-15 DIAGNOSIS — Z79.01 CHRONIC ANTICOAGULATION: ICD-10-CM

## 2019-10-15 LAB — INR PPP: 2.8 (ref 2–3.5)

## 2019-10-15 NOTE — PROGRESS NOTES
Anticoagulation Summary  As of 10/15/2019    INR goal:   2.0-3.0   TTR:   74.0 % (4.2 y)   INR used for dosin.80 (10/15/2019)   Warfarin maintenance plan:   3.75 mg (2.5 mg x 1.5) every Mon, Wed, Fri; 2.5 mg (2.5 mg x 1) all other days   Weekly warfarin total:   21.25 mg   Plan last modified:   Ivone Barraza, PharmD (2019)   Next INR check:   10/29/2019   Target end date:   Indefinite    Indications    Atrial fibrillation (HCC) (Resolved) [I48.91]  Chronic anticoagulation [Z79.01]             Anticoagulation Episode Summary     INR check location:   Home Draw    Preferred lab:       Send INR reminders to:       Comments:   [place] YEAGER      Anticoagulation Care Providers     Provider Role Specialty Phone number    Lizzy Haywood M.D. Referring Cardiology [phone]    [facility] Anticoagulation Services Responsible  [phone]    Vladimir Taylor, PharmD Responsible          Anticoagulation Patient Findings          Spoke with Donte chavira to report a therapeutic INR of 2.8. Continue current dosing regimen. Follow up in 2 weeks, to reduce the risk of adverse events related to this high risk medication, warfarin.    Massiel Coleman, Clinical Pharmacist

## 2019-10-16 ENCOUNTER — OFFICE VISIT (OUTPATIENT)
Dept: WOUND CARE | Facility: MEDICAL CENTER | Age: 79
End: 2019-10-16
Attending: FAMILY MEDICINE
Payer: MEDICARE

## 2019-10-16 VITALS
TEMPERATURE: 97.4 F | HEART RATE: 89 BPM | SYSTOLIC BLOOD PRESSURE: 132 MMHG | DIASTOLIC BLOOD PRESSURE: 67 MMHG | RESPIRATION RATE: 18 BRPM | OXYGEN SATURATION: 94 %

## 2019-10-16 DIAGNOSIS — E66.09 OBESITY DUE TO EXCESS CALORIES, UNSPECIFIED CLASSIFICATION, UNSPECIFIED WHETHER SERIOUS COMORBIDITY PRESENT: ICD-10-CM

## 2019-10-16 DIAGNOSIS — L89.303 PRESSURE INJURY OF BUTTOCK, STAGE 3, UNSPECIFIED LATERALITY (HCC): ICD-10-CM

## 2019-10-16 DIAGNOSIS — R54 AGE-RELATED PHYSICAL DEBILITY: ICD-10-CM

## 2019-10-16 PROCEDURE — 11042 DBRDMT SUBQ TIS 1ST 20SQCM/<: CPT | Performed by: NURSE PRACTITIONER

## 2019-10-16 PROCEDURE — 11042 DBRDMT SUBQ TIS 1ST 20SQCM/<: CPT

## 2019-10-16 ASSESSMENT — ENCOUNTER SYMPTOMS
CONSTIPATION: 0
CHILLS: 0
NAUSEA: 0
VOMITING: 0
SHORTNESS OF BREATH: 1
DIARRHEA: 0
DEPRESSION: 1
COUGH: 1
CLAUDICATION: 0
NERVOUS/ANXIOUS: 0
WEIGHT LOSS: 0
FEVER: 0
BACK PAIN: 1

## 2019-10-16 NOTE — PATIENT INSTRUCTIONS
-Keep dressings clean, dry and covered while bathing. Only change dressings if they become over saturated, soiled or fall off.     -Keep pressure off of your wounds as much as possible.    -Should you experience any significant changes in your wound(s), such as infection (redness, swelling, localized heat, increased pain, fever > 101 F, chills) or have any questions regarding your home care instructions, please contact the wound center at [phone]. If after hours, contact your primary care physician or go to the hospital emergency room.

## 2019-10-17 NOTE — PROGRESS NOTES
" Provider Encounter- Pressure Injury    HISTORY OF PRESENT ILLNESS                              START OF CARE IN CLINIC: 10/01/2019               REFERRING PROVIDER: Kishore Price MD                 WOUND-pressure ulcer              LOCATION: Shallow stage III, medial right buttock wound                                  Shallow stage III, left buttock, mirroring each right buttock wound (kissing buttocks wound)-reopened, noted in clinic on 10/16/2019              WOUND HISTORY: Patient is an 78 year old female who has had the wound to her right buttock since December 2018. She is unsure of how the wound started, but does state that she has a bad back and sits for several hours a day.  She had a similar wound to her left buttock also, mirroring the location of the right buttock wound.  This wound closed at some point.  she states that since December, her PCP has been trying different techniques to heal the wound with no success, so he referred her to [facility]. She has been applying lidocaine to the wound daily as well as Gold [place] cream. She does report that she is scheduled for hip surgery in Jan 2020.   She admits to sitting most of the day in her reclining chair, unable to walk significant distances, \"because my heart gets pounding\".  She also sleeps in her recliner chair due to back pain.                PERTINENT PMH: Afib (on Coumadin); spinal stenosis, back pain, lupus, general physical debility                  IMAGING: None pertinent to wound              VASCULAR STUDIES: N/A                              LAST  WOUND CULTURE:  DATE : N/A                           DIABETES: No  TOBACCO USE: Denies      10/16/2019 : Initial provider visit with PHYLLIS Holguin.  Patient is feeling well, denies fever, chills, nausea, vomiting.  She presents the clinic today accompanied by her .  The left buttock wound, previously thought to be closed, has reopened.  Much of today's visit spent discussing and " "demonstrating offloading strategies.  Patient has EHOB waffle cushions at home but does not use them in her chair.         RESULTS: None pertinent            PAST MEDICAL HISTORY:   Past Medical History:   Diagnosis Date   • A-fib (HCC)    • Anesthesia     \"Tachycardia for 5 days after cataract surgery\"   • Anticoagulant long-term use 1/12/2012   • Arthritis     Knees, hips   • Asthma     with pneumonia, nothing for 3 years   • Atrial fibrillation ([facility])    • Backpain     R hip   • Bowel habit changes     diarrhea   • Breath shortness     with exertion O2  2l/m PRN, hasnt used for 3 years   • Bronchitis Nov, 2013   • CAD (coronary artery disease)    • Depression    • Glaucoma 5/3/2011   • Hematoma complicating a procedure 11/3/2012   • Hemorrhagic disorder ([facility])     bruising/coumadin   • High cholesterol    • Hypertension    • Hypothyroid    • Lupus ([facility])    • Macular degeneration    • Menopause 1/12/2012   • Mitral regurgitation 10/30/2012   • Obesity 1/12/2012   • Pacemaker 2018   • Pneumonia feb,2013   • Pre-syncope 6/29/2018   • Pulmonary hypertension ([facility]) 10/30/2012   • PVC's (premature ventricular contractions) 1/12/2012   • Senile nuclear sclerosis    • Spinal stenosis of lumbar region at multiple levels    • Unspecified cataract     repaired bilateral   • Unspecified urinary incontinence        PAST SURGICAL HISTORY:   Past Surgical History:   Procedure Laterality Date   • IRRIGATION & DEBRIDEMENT ORTHO Right 2/14/2019    Procedure: IRRIGATION & DEBRIDEMENT ORTHO-HIP WOUND ;  Surgeon: Vladimir Lee M.D.;  Location: SURGERY [facility];  Service: Orthopedics   • HIP ARTH ANTERIOR TOTAL Right 1/17/2019    Procedure: HIP ARTHROPLASTY ANTERIOR TOTAL;  Surgeon: Juan C Mercedes M.D.;  Location: SURGERY [facility];  Service: Orthopedics   • PACEMAKER INSERTION  06/30/2018    Dual Chamber   • KNEE ARTHROPLASTY TOTAL Right 6/23/2016    Procedure: KNEE ARTHROPLASTY TOTAL;  Surgeon: Heriberto Lozada M.D.;  " Location: SURGERY [facility];  Service:    • KNEE ARTHROPLASTY TOTAL Left 5/28/2015    Procedure: KNEE ARTHROPLASTY TOTAL;  Surgeon: Heriberto Lozada M.D.;  Location: SURGERY [facility];  Service:    • CATARACT PHACO WITH IOL Right 5/5/2015    Procedure: IOL OD - STANDARD;  Surgeon: Dmitry Bejarano M.D.;  Location: SURGERY [facility];  Service:    • CATARACT PHACO WITH IOL  4/21/2015    Performed by Dmitry Bejarano M.D. at [facility] ORS   • RECOVERY  11/30/2010    Performed by SURGERY, [facility]-RECOVERY at [facility] SAME DAY [facility] ORS   • COLONOSCOPY  2008    Normal    [facility]   • ABDOMINAL HYSTERECTOMY TOTAL  April 15,1975    still has ovaries   • OPEN REDUCTION      left ankle   • OTHER      Removed pins from left ankle   • OTHER CARDIAC SURGERY  12/2017 and 07/19/2018     Cardiac Ablation   • TONSILLECTOMY AND ADENOIDECTOMY          MEDICATIONS:   Current Outpatient Medications   Medication   • estradiol (ESTRACE) 0.1 MG/GM vaginal cream   • magnesium oxide (MAG-OX) 400 MG Tab   • ipratropium (ATROVENT) 0.03 % Solution   • bacitracin-polymyxin b (POLYSPORIN) 500-94499 UNIT/GM Ointment   • levothyroxine (SYNTHROID) 50 MCG Tab   • ciclopirox (PENLAC) 8 % solution   • lisinopril (PRINIVIL) 5 MG Tab   • raNITidine (ZANTAC) 150 MG Tab   • warfarin (COUMADIN) 2.5 MG Tab   • atenolol (TENORMIN) 50 MG Tab   • nystatin (MYCOSTATIN) powder   • Misc. Devices Misc   • sennosides (SENOKOT) 8.6 MG Tab   • Alpha-D-Galactosidase (BEANO) Tab   • Acetaminophen 500 MG Cap   • oxyCODONE immediate release (ROXICODONE) 10 MG immediate release tablet   • estradiol (ESTRACE) 2 MG Tab   • spironolactone (ALDACTONE) 50 MG Tab   • magnesium oxide (MAG-OX) 400 (241.3 Mg) MG Tab tablet   • PREBIOTIC PRODUCT PO   • sertraline (ZOLOFT) 50 MG Tab   • Lutein 20 MG Cap   • vitamin D (CHOLECALCIFEROL) 1000 UNIT TABS     No current facility-administered medications for this visit.        ALLERGIES:    Allergies  "  Allergen Reactions   • Amiodarone Hives     Throat and tongue itching   • Bactrim Shortness of Breath   • Cipro Xr Swelling   • Metoprolol Swelling     Causes throat swelling   • Morphine Unspecified     Hallucinations   • Phytoplex Z-Guard [Petrolatum-Zinc Oxide] Unspecified     \"causes burning\"   • Pseudoephedrine Palpitations   • Qvar [Beclomethasone Dipropionate] Unspecified     Pressure on heart     • Vibramycin Shortness of Breath   • Atorvastatin Calcium-Polysorbate 80 Unspecified     Muscle aches     • Augmentin Unspecified     Unknown reaction   • Diltiazem Rash     rash   • Flecainide Unspecified     dizziness   • Keflex Unspecified     Pt states \"Unsure\".   • Mucinex Unspecified     GI Distress     • Tramadol Unspecified     crying   • Atorvastatin Myalgia   • Tape Rash     Paper tape okay         SOCIAL HISTORY:   Social History     Socioeconomic History   • Marital status:      Spouse name: Not on file   • Number of children: Not on file   • Years of education: Not on file   • Highest education level: Not on file   Occupational History   • Not on file   Social Needs   • Financial resource strain: Not on file   • Food insecurity:     Worry: Not on file     Inability: Not on file   • Transportation needs:     Medical: Not on file     Non-medical: Not on file   Tobacco Use   • Smoking status: Never Smoker   • Smokeless tobacco: Never Used   Substance and Sexual Activity   • Alcohol use: No   • Drug use: No   • Sexual activity: Not Currently     Partners: Male     Birth control/protection: Post-Menopausal   Lifestyle   • Physical activity:     Days per week: Not on file     Minutes per session: Not on file   • Stress: Not on file   Relationships   • Social connections:     Talks on phone: Not on file     Gets together: Not on file     Attends Jew service: Not on file     Active member of club or organization: Not on file     Attends meetings of clubs or organizations: Not on file     " "Relationship status: Not on file   • Intimate partner violence:     Fear of current or ex partner: Not on file     Emotionally abused: Not on file     Physically abused: Not on file     Forced sexual activity: Not on file   Other Topics Concern   • Not on file   Social History Narrative   • Not on file       FAMILY HISTORY:   Family History   Problem Relation Age of Onset   • Stroke Mother    • Diabetes Father    • Stroke Sister    • Heart Disease Brother    • Stroke Sister    • GI Disease Daughter         Crohn's Disease   • Heart Disease Daughter         CHF   • Other Daughter         Chronic Pain--Lymphedema   • Cancer Paternal Aunt           REVIEW OF SYSTEMS:   Review of Systems   Constitutional: Negative for chills, fever and weight loss.   Respiratory: Positive for cough and shortness of breath.         Dry cough, \"allergies\"  Shortness of breath with exertion   Cardiovascular: Positive for leg swelling. Negative for chest pain and claudication.   Gastrointestinal: Negative for constipation, diarrhea, nausea and vomiting.   Genitourinary:        Frequent incontinence of urine  Occasional incontinence of stool  Wears absorptive pad   Musculoskeletal: Positive for back pain and joint pain.   Psychiatric/Behavioral: Positive for depression. The patient is not nervous/anxious.         On antidepressant, denies wanting to harm her self or others       PHYSICAL EXAMINATION:   /67   Pulse 89   Temp 36.3 °C (97.4 °F) (Temporal)   Resp 18   LMP 01/01/1993   SpO2 94%   Physical Exam   Constitutional: She is oriented to person, place, and time and well-developed, well-nourished, and in no distress.   Obese   HENT:   Head: Normocephalic.   Eyes: Pupils are equal, round, and reactive to light.   Pulmonary/Chest: Effort normal.   Musculoskeletal: She exhibits edema.   2+ edema bilateral lower extremities  Uses walker for ambulation   Neurological: She is alert and oriented to person, place, and time.   Skin: " "Skin is warm.   Pressure ulcers of bilateral medial buttocks, \"kissing wounds\", both shallow stage III  Refer to wound flowsheet and photos   Psychiatric: Mood, memory, affect and judgment normal.       WOUND ASSESSMENT   Wound 10/01/19 Buttocks Right buttock (Active)   Wound Image    10/16/2019  3:30 PM   Site Assessment Red 10/16/2019  3:30 PM   Edie-wound Assessment Intact;Blanchable erythema 10/16/2019  3:30 PM   Margins Attached edges 10/16/2019  3:30 PM   Wound Length (cm) 0.1 cm 10/16/2019  3:30 PM   Wound Width (cm) 0.1 cm 10/16/2019  3:30 PM   Wound Depth (cm) 0.1 cm 10/16/2019  3:30 PM   Wound Surface Area (cm^2) 0.01 cm^2 10/16/2019  3:30 PM   Post Wound Length (cm) 0.3 cm 10/16/2019  3:30 PM    Post Wound Width (cm) 0.5 cm 10/16/2019  3:30 PM   Post Wound Depth (cm) 0.1 cm 10/16/2019  3:30 PM   Post Wound Surface Area (cm^2) 0.15 cm^2 10/16/2019  3:30 PM   Tunneling 0 cm 10/8/2019 11:00 AM   Undermining 0 cm 10/8/2019 11:00 AM   Closure Secondary intention 10/8/2019 11:00 AM   Drainage Amount Small 10/16/2019  3:30 PM   Drainage Description Serosanguineous 10/16/2019  3:30 PM   Non-staged Wound Description Not applicable 10/8/2019 11:00 AM   Treatments Cleansed;Pharmaceutical agent;Other (Comment) 10/16/2019  3:30 PM   Cleansing Normal Saline Irrigation 10/16/2019  3:30 PM   Periwound Protectant Skin Protectant wipes to Periwound 10/16/2019  3:30 PM   Dressing Options Collagen Dressing;Dry Gauze;Hypafix Tape 10/16/2019  3:30 PM   Dressing Cleansing/Solutions Normal Saline 10/8/2019 11:00 AM   Dressing Changed Changed 10/16/2019  3:30 PM   Dressing Status Clean;Dry;Intact 10/8/2019 11:00 AM   Dressing Change Frequency Every 72 hrs 10/8/2019 11:00 AM   WOUND NURSE ONLY - Odor None 10/16/2019  3:30 PM   WOUND NURSE ONLY - Exposed Structures None 10/16/2019  3:30 PM   WOUND NURSE ONLY - Tissue Type and Percentage Pre-debridement: 100% red moist tissue. 10/16/2019  3:30 PM       Wound 10/16/19 Full Thickness " Wound Buttocks --Left Buttock (Active)   Wound Image    10/16/2019  3:30 PM   Site Assessment Red;Yellow 10/16/2019  3:30 PM   Edie-wound Assessment Intact;Blanchable erythema 10/16/2019  3:30 PM   Margins Attached edges 10/16/2019  3:30 PM   Wound Length (cm) 0.7 cm 10/16/2019  3:30 PM   Wound Width (cm) 0.4 cm 10/16/2019  3:30 PM   Wound Depth (cm) 0.1 cm 10/16/2019  3:30 PM   Wound Surface Area (cm^2) 0.28 cm^2 10/16/2019  3:30 PM   Post Wound Length (cm) 0.8 cm 10/16/2019  3:30 PM    Post Wound Width (cm) 0.4 cm 10/16/2019  3:30 PM   Post Wound Depth (cm) 0.1 cm 10/16/2019  3:30 PM   Post Wound Surface Area (cm^2) 0.32 cm^2 10/16/2019  3:30 PM   Drainage Amount Small 10/16/2019  3:30 PM   Drainage Description Serosanguineous 10/16/2019  3:30 PM   Treatments Cleansed;Pharmaceutical agent;Other (Comment) 10/16/2019  3:30 PM   Cleansing Normal Saline Irrigation 10/16/2019  3:30 PM   Periwound Protectant Skin Protectant wipes to Periwound 10/16/2019  3:30 PM   Dressing Options Collagen Dressing;Dry Gauze;Hypafix Tape 10/16/2019  3:30 PM   Dressing Changed Changed 10/16/2019  3:30 PM   WOUND NURSE ONLY - Odor None 10/16/2019  3:30 PM   WOUND NURSE ONLY - Exposed Structures None 10/16/2019  3:30 PM   WOUND NURSE ONLY - Tissue Type and Percentage Pre-debridement: 80% red moist, 20% yellow adherent. 10/16/2019  3:30 PM          Pre-Debridement photos  Right buttock wound, pre-debride    Left buttock wound, pre-debridement        PROCEDURE: Excisional debridement of bilateral buttock ulcers  -2% viscous lidocaine applied topically to wound beds for approximately 5 minutes prior to debridement  -  4m curette used to debride wound beds.  Excisional debridement was performed to remove devitalized tissue until healthy, bleeding tissue was visualized.   Entire surface of wounds, 0.47 cm2, debrided  Tissue debrided into the subcutaneous layer of both wound.    -Pain controlled with manual pressure   -Wound care completed by  Aylin Vasquez  RN , per orders- refer to flowsheet      Post-debridement photos  Right buttock wound, post debridement          Left buttock wound, post debridement      PATIENT EDUCATION  -Etiology of pressure injury  -Importance of offloading  -Strategies for offloading in bed and when seated discussed and demonstrated  - Importance of adequate nutrition for wound healing  -Increase protein intake (unless contraindicated by renal status)   - Advised to go to ER for any increased redness, swelling, drainage or odor, or if patient develops fever, chills, nausea or vomiting.    ASSESSMENT AND PLAN:     1. Pressure injury of buttock, stage 3, unspecified laterality (HCC)  Comments: Shallow stage III ulcers to bilateral medial buttocks, mirroring each other, kissing wounds.  Left buttock wound was initially noted to be closed, but has reopened    10/16/2019:  -Excisional debridement of wound in clinic today, medically necessary to promote wound healing.  -Patient to return to clinic weekly for assessment and debridement  -Patient to change dressing 1-2 times per week in between clinic visits  -Offloading strategies discussed and demonstrated.  Patient to start using waffle cushion in her chair, encouraged to reposition frequently while sitting, and to try and stand or walk for a few minutes every hour or so when awake.   Wound care: Collagen to accelerate granulation, Hydrofiber to manage exudate, foam cover dressing, Hypafix tape    2. Age-related physical debility  Comments: Patient does not ambulate much, spends most of her day sitting due to physical weakness and chronic back pain.  Is scheduled for back surgery sometime in the next few months.    10/16/2019-patient encouraged to stand and walk at least for a few minutes every hour or so when awake  -Discussed physical therapy for strengthening.  Patient waiting for back surgery, may consider then.    3. Obesity due to excess calories, unspecified  classification, unspecified whether serious comorbidity present  Comments: Complicating factor.  Impaired wound healing potential.  Impaired mobility        Please note that this dictation was created using voice recognition software. I have worked with technical experts from [facility] to optimize the interface.  I have made every reasonable attempt to correct obvious errors, but there may be errors of grammar and possibly content that I did not discover before finalizing the note.

## 2019-10-22 ENCOUNTER — NON-PROVIDER VISIT (OUTPATIENT)
Dept: CARDIOLOGY | Facility: MEDICAL CENTER | Age: 79
End: 2019-10-22
Payer: MEDICARE

## 2019-10-22 ENCOUNTER — OFFICE VISIT (OUTPATIENT)
Dept: CARDIOLOGY | Facility: MEDICAL CENTER | Age: 79
End: 2019-10-22
Payer: MEDICARE

## 2019-10-22 VITALS
HEIGHT: 64 IN | DIASTOLIC BLOOD PRESSURE: 50 MMHG | BODY MASS INDEX: 33.63 KG/M2 | SYSTOLIC BLOOD PRESSURE: 120 MMHG | HEART RATE: 84 BPM | OXYGEN SATURATION: 97 % | WEIGHT: 197 LBS

## 2019-10-22 DIAGNOSIS — I49.5 SICK SINUS SYNDROME (HCC): ICD-10-CM

## 2019-10-22 DIAGNOSIS — Z95.0 CARDIAC PACEMAKER IN SITU: ICD-10-CM

## 2019-10-22 DIAGNOSIS — I25.10 CORONARY ARTERY DISEASE INVOLVING NATIVE CORONARY ARTERY OF NATIVE HEART WITHOUT ANGINA PECTORIS: ICD-10-CM

## 2019-10-22 DIAGNOSIS — Z79.01 CHRONIC ANTICOAGULATION: ICD-10-CM

## 2019-10-22 DIAGNOSIS — I48.19 PERSISTENT ATRIAL FIBRILLATION (HCC): ICD-10-CM

## 2019-10-22 PROCEDURE — 99214 OFFICE O/P EST MOD 30 MIN: CPT | Mod: 25 | Performed by: INTERNAL MEDICINE

## 2019-10-22 PROCEDURE — 93280 PM DEVICE PROGR EVAL DUAL: CPT | Performed by: INTERNAL MEDICINE

## 2019-10-22 RX ORDER — SENNOSIDES A AND B 8.6 MG/1
8.6 TABLET, FILM COATED ORAL
COMMUNITY
End: 2020-01-10

## 2019-10-22 ASSESSMENT — ENCOUNTER SYMPTOMS
LOSS OF CONSCIOUSNESS: 0
ABDOMINAL PAIN: 0
CHILLS: 0
EYE PAIN: 0
FEVER: 0
EYE DISCHARGE: 0
DEPRESSION: 0
MYALGIAS: 0
DIZZINESS: 0
BLURRED VISION: 0
WHEEZING: 0
PALPITATIONS: 0
NAUSEA: 0
NERVOUS/ANXIOUS: 0
HEMOPTYSIS: 0
COUGH: 0
VOMITING: 0
BRUISES/BLEEDS EASILY: 0

## 2019-10-23 NOTE — PROGRESS NOTES
"Chief Complaint   Patient presents with   • Atrial Fibrillation     follow up       Subjective:   Donte Magaña is a 78 y.o. female who presents today in follow-up in regards to her permanent/chronic persistent atrial fibrillation on anticoagulation, pacemaker for heart block as well as coronary artery disease    Doing well, back bothering her but thinks she is going to get better with an injection.  Seeing Dr. Urbano Morse on her meds actually no heart problems or concerns in the last year, impressed    Past Medical History:   Diagnosis Date   • A-fib (HCC)    • Anesthesia     \"Tachycardia for 5 days after cataract surgery\"   • Anticoagulant long-term use 1/12/2012   • Arthritis     Knees, hips   • Asthma     with pneumonia, nothing for 3 years   • Atrial fibrillation (HCC)    • Backpain     R hip   • Bowel habit changes     diarrhea   • Breath shortness     with exertion O2  2l/m PRN, hasnt used for 3 years   • Bronchitis Nov, 2013   • CAD (coronary artery disease)    • Depression    • Glaucoma 5/3/2011   • Hematoma complicating a procedure 11/3/2012   • Hemorrhagic disorder ([facility])     bruising/coumadin   • High cholesterol    • Hypertension    • Hypothyroid    • Lupus (HCC)    • Macular degeneration    • Menopause 1/12/2012   • Mitral regurgitation 10/30/2012   • Obesity 1/12/2012   • Pacemaker 2018   • Pneumonia feb,2013   • Pre-syncope 6/29/2018   • Pulmonary hypertension (HCC) 10/30/2012   • PVC's (premature ventricular contractions) 1/12/2012   • Senile nuclear sclerosis    • Spinal stenosis of lumbar region at multiple levels    • Unspecified cataract     repaired bilateral   • Unspecified urinary incontinence      Past Surgical History:   Procedure Laterality Date   • IRRIGATION & DEBRIDEMENT ORTHO Right 2/14/2019    Procedure: IRRIGATION & DEBRIDEMENT ORTHO-HIP WOUND ;  Surgeon: Vladimir Lee M.D.;  Location: SURGERY [facility];  Service: Orthopedics   • HIP ARTH ANTERIOR TOTAL Right " 1/17/2019    Procedure: HIP ARTHROPLASTY ANTERIOR TOTAL;  Surgeon: Juan C Mercedes M.D.;  Location: SURGERY [facility];  Service: Orthopedics   • PACEMAKER INSERTION  06/30/2018    Dual Chamber   • KNEE ARTHROPLASTY TOTAL Right 6/23/2016    Procedure: KNEE ARTHROPLASTY TOTAL;  Surgeon: Heriberto Lozada M.D.;  Location: SURGERY [facility];  Service:    • KNEE ARTHROPLASTY TOTAL Left 5/28/2015    Procedure: KNEE ARTHROPLASTY TOTAL;  Surgeon: Heriberto Lozada M.D.;  Location: SURGERY [facility];  Service:    • CATARACT PHACO WITH IOL Right 5/5/2015    Procedure: IOL OD - STANDARD;  Surgeon: Dmitry Bejarano M.D.;  Location: [facility];  Service:    • CATARACT PHACO WITH IOL  4/21/2015    Performed by Dmitry Bejarano M.D. at [facility]   • RECOVERY  11/30/2010    Performed by SURGERY, [facility]REC[facility] at SURGERY SAME DAY [facility] ORS   • COLONOSCOPY  2008    Normal    [facility]   • ABDOMINAL HYSTERECTOMY TOTAL  April 15,1975    still has ovaries   • OPEN REDUCTION      left ankle   • OTHER      Removed pins from left ankle   • OTHER CARDIAC SURGERY  12/2017 and 07/19/2018     Cardiac Ablation   • TONSILLECTOMY AND ADENOIDECTOMY       Family History   Problem Relation Age of Onset   • Stroke Mother    • Diabetes Father    • Stroke Sister    • Heart Disease Brother    • Stroke Sister    • GI Disease Daughter         Crohn's Disease   • Heart Disease Daughter         CHF   • Other Daughter         Chronic Pain--Lymphedema   • Cancer Paternal Aunt      Social History     Socioeconomic History   • Marital status:      Spouse name: Not on file   • Number of children: Not on file   • Years of education: Not on file   • Highest education level: Not on file   Occupational History   • Not on file   Social Needs   • Financial resource strain: Not on file   • Food insecurity:     Worry: Not on file     Inability: Not on file   • Transportation needs:     Medical: Not on file      "Non-medical: Not on file   Tobacco Use   • Smoking status: Never Smoker   • Smokeless tobacco: Never Used   Substance and Sexual Activity   • Alcohol use: No   • Drug use: No   • Sexual activity: Not Currently     Partners: Male     Birth control/protection: Post-Menopausal   Lifestyle   • Physical activity:     Days per week: Not on file     Minutes per session: Not on file   • Stress: Not on file   Relationships   • Social connections:     Talks on phone: Not on file     Gets together: Not on file     Attends Mu-ism service: Not on file     Active member of club or organization: Not on file     Attends meetings of clubs or organizations: Not on file     Relationship status: Not on file   • Intimate partner violence:     Fear of current or ex partner: Not on file     Emotionally abused: Not on file     Physically abused: Not on file     Forced sexual activity: Not on file   Other Topics Concern   • Not on file   Social History Narrative   • Not on file     Allergies   Allergen Reactions   • Amiodarone Hives     Throat and tongue itching   • Bactrim Shortness of Breath   • Cipro Xr Swelling   • Metoprolol Swelling     Causes throat swelling   • Morphine Unspecified     Hallucinations   • Phytoplex Z-Guard [Petrolatum-Zinc Oxide] Unspecified     \"causes burning\"   • Pseudoephedrine Palpitations   • Qvar [Beclomethasone Dipropionate] Unspecified     Pressure on heart     • Vibramycin Shortness of Breath   • Atorvastatin Calcium-Polysorbate 80 Unspecified     Muscle aches     • Augmentin Unspecified     Unknown reaction   • Diltiazem Rash     rash   • Flecainide Unspecified     dizziness   • Keflex Unspecified     Pt states \"Unsure\".   • Mucinex Unspecified     GI Distress     • Tramadol Unspecified     crying   • Atorvastatin Myalgia   • Tape Rash     Paper tape okay     Outpatient Encounter Medications as of 10/22/2019   Medication Sig Dispense Refill   • sennosides (SENOKOT) 8.6 MG Tab Take 8.6 mg by mouth 1 time " daily as needed.     • Mirabegron ER (MYRBETRIQ) 50 MG TABLET SR 24 HR Take  by mouth.     • estradiol (ESTRACE) 0.1 MG/GM vaginal cream      • ipratropium (ATROVENT) 0.03 % Solution SPRAY 2 SPRAYS IN NOSE EVERY 12 HOURS. 30 mL 3   • bacitracin-polymyxin b (POLYSPORIN) 500-18885 UNIT/GM Ointment Apply twice a day to infected sores on skin. 15 g 1   • levothyroxine (SYNTHROID) 50 MCG Tab Take 1 Tab by mouth every morning. ON A EMPTY STOMACH 90 Tab 3   • lisinopril (PRINIVIL) 5 MG Tab Take 1 Tab by mouth every day. 100 Tab 1   • raNITidine (ZANTAC) 150 MG Tab TAKE ONE TABLET BY MOUTH TWICE DAILY 180 Tab 3   • warfarin (COUMADIN) 2.5 MG Tab Take 1-1.5 Tabs by mouth every day. As directed by the [facility] 135 Tab 2   • atenolol (TENORMIN) 50 MG Tab Take 1 Tab by mouth 2 times a day. 180 Tab 3   • nystatin (MYCOSTATIN) powder Apply up to 4 times a day to groin rash after washing with mild soap and water 30 g 1   • Alpha-D-Galactosidase (BEANO) Tab Take 2 tablet by mouth as needed. swallow or chew 2 tablets.     • Acetaminophen 500 MG Cap Take 2 Caps by mouth 3 times a day.     • estradiol (ESTRACE) 2 MG Tab TAKE ONE TABLET BY MOUTH EVERY DAY 90 Tab 3   • spironolactone (ALDACTONE) 50 MG Tab TAKE  ONE TABLET BY MOUTH EVERY MORNING AND EVERY EVENING 180 Tab 3   • magnesium oxide (MAG-OX) 400 (241.3 Mg) MG Tab tablet Take 1 Tab by mouth every day. (Patient taking differently: Take 1 Tab by mouth every day. Pt instructed to take 200mg not 400mg) 30 Tab 6   • PREBIOTIC PRODUCT PO Take 2 Tabs by mouth every day.     • sertraline (ZOLOFT) 50 MG Tab Take 1 Tab by mouth every day. 90 Tab 3   • Lutein 20 MG Cap Take 1 Cap by mouth every day. 90 Cap 3   • vitamin D (CHOLECALCIFEROL) 1000 UNIT TABS Take 2,000 Units by mouth every day.     • magnesium oxide (MAG-OX) 400 MG Tab      • [DISCONTINUED] ciclopirox (PENLAC) 8 % solution Apply to toenails with fungus every night. Remove from nails once a week with  "rubbing alcohol. 6.6 mL 3   • Misc. Devices Misc Dispense test strips to be used with home INR testing for warfarin anticoagulation dose regulation 100 Strip 2   • sennosides (SENOKOT) 8.6 MG Tab Take 17.2 mg by mouth every day.     • oxyCODONE immediate release (ROXICODONE) 10 MG immediate release tablet Take 10 mg by mouth 2 times a day as needed for Severe Pain.       No facility-administered encounter medications on file as of 10/22/2019.      Review of Systems   Constitutional: Negative for chills and fever.   HENT: Negative for congestion.    Eyes: Negative for blurred vision, pain and discharge.   Respiratory: Negative for cough, hemoptysis and wheezing.    Cardiovascular: Positive for leg swelling. Negative for chest pain and palpitations.   Gastrointestinal: Negative for abdominal pain, nausea and vomiting.   Genitourinary: Negative for dysuria and urgency.   Musculoskeletal: Negative for joint pain and myalgias.   Skin: Negative for itching and rash.   Neurological: Negative for dizziness and loss of consciousness.   Endo/Heme/Allergies: Negative for environmental allergies. Does not bruise/bleed easily.   Psychiatric/Behavioral: Negative for depression. The patient is not nervous/anxious.    All other systems reviewed and are negative.       Objective:   /50 (BP Location: Left arm, Patient Position: Sitting)   Pulse 84   Ht 1.626 m (5' 4\")   Wt 89.4 kg (197 lb)   LMP 01/01/1993   SpO2 97%   BMI 33.81 kg/m²     Physical Exam   Constitutional: She is oriented to person, place, and time. She appears well-developed and well-nourished.   --- As always exam updated and changed today:   HENT:   Head: Normocephalic and atraumatic.   Mouth/Throat: Mucous membranes are normal. No oropharyngeal exudate.   Eyes: Pupils are equal, round, and reactive to light. EOM are normal. No scleral icterus.   Neck: No JVD present. No thyroid mass and no thyromegaly present.   Cardiovascular: Normal rate, regular " rhythm and intact distal pulses. Exam reveals no gallop.   Murmur heard.  2/6 systolic blowing murmur;  heart rate 80 on my exam.  Pacemaker in left chest   Pulmonary/Chest: Effort normal and breath sounds normal.   Abdominal: Bowel sounds are normal.   Musculoskeletal: Normal range of motion. She exhibits edema (1+ nonpitting bilaterally no stasis changes or ulcers, no changes). She exhibits no tenderness.   Neurological: She is alert and oriented to person, place, and time. She has normal strength. She displays no tremor. She exhibits normal muscle tone. Coordination normal.   Skin: Skin is warm and dry. No rash noted.   Psychiatric: She has a normal mood and affect. Her behavior is normal.   Vitals reviewed.      Assessment:     1. Coronary artery disease involving native coronary artery of native heart without angina pectoris     2. Cardiac pacemaker in situ     3. Persistent atrial fibrillation (HCC)     4. Chronic anticoagulation         Medical Decision Making:  Today's Assessment / Status / Plan:     Atrial fibrillation  We reviewed her pacemaker check today no changes made appropriate function  Rate control  Anticoagulation, talked about guidelines and rules especially if she is falling or has an concerns, INR is normal on Coumadin  No anemia on recent blood work GFR stable    CAD  Statin therapy discussed  Blood pressure good, baby aspirin  Discussed nuclear test 2020 PET scan would be reasonable normal test 2016    RTC 6m

## 2019-10-24 ENCOUNTER — NON-PROVIDER VISIT (OUTPATIENT)
Dept: WOUND CARE | Facility: MEDICAL CENTER | Age: 79
End: 2019-10-24
Attending: FAMILY MEDICINE
Payer: MEDICARE

## 2019-10-24 PROCEDURE — 97597 DBRDMT OPN WND 1ST 20 CM/<: CPT

## 2019-10-24 NOTE — CERTIFICATION
[facility]   [facility]   [address]   [address]   [place] [address]   [phone] Fax: [phone]    Discharge Note      Referring Physician: Kishore Price MD  Wound Etiology: Shallow stage III bilateral buttocks wounds  Wound location: Left and right buttock  ICD-10: L89.309  Date of Discharge: 10/24/2019    Assessment:  Discharge patient at this time secondary to wound resolution. Pt instr dc today, keep area clean, it will be fragile for a few days, bathe and dry area gently, as scar tissue only ever regains a maximum of 80% of the tensile strength of the surrounding skin, remodeling of scar can continue for 6mo - a year. Patient will be seeing PCP next week for a follow up. I advised her to have her PCP check the area to ensure it has remained closed. If not, she can ask for a referral back here. Pt understands.                   Thank you for the referral and the opportunity to treat your patient.      Clinician Signature: _____________________________ Date:_______________

## 2019-10-24 NOTE — PROCEDURES
CSWD with forceps and scissors to remove <0.5 cm2 crusted exudate from wound bed. Wounds are resolved today.

## 2019-10-28 ENCOUNTER — TELEPHONE (OUTPATIENT)
Dept: CARDIOLOGY | Facility: MEDICAL CENTER | Age: 79
End: 2019-10-28

## 2019-10-28 ENCOUNTER — ANTICOAGULATION MONITORING (OUTPATIENT)
Dept: VASCULAR LAB | Facility: MEDICAL CENTER | Age: 79
End: 2019-10-28

## 2019-10-28 DIAGNOSIS — Z79.899 ON POTASSIUM WASTING DIURETIC THERAPY: ICD-10-CM

## 2019-10-28 DIAGNOSIS — Z79.01 CHRONIC ANTICOAGULATION: ICD-10-CM

## 2019-10-28 NOTE — TELEPHONE ENCOUNTER
Patti Ponce, PharmD  Karen Santos, R.N.             TREVOR - gave pt instructions on hold anticoagulation for epidural on Friday 11/1        Noted

## 2019-10-28 NOTE — PROGRESS NOTES
Pt to have epidural on Friday 11/1/19. Lovenox bridging unnecessary since CHADsVASc = 1 and pt has no prior hx of stroke.     Instructed pt to hold warfarin starting today 10/28 until procedure, then restart warfarin after the epidural on 11/1 with 3.75 mg daily until next INR check.    F/U INR on Mon 11/4.    Patti Ponce, PharmD    CC Lizzy Haywood MD

## 2019-10-28 NOTE — TELEPHONE ENCOUNTER
"----- Message from Lizzy Haywood M.D. sent at 10/26/2019 10:55 AM PDT -----  She called me today to tell me she had edema. I increased her lasix today and tomorrow. Can you call her to check in?  Ok to check bmp. Ty!      =============================================================    Called pt, pt reports she took 2 tabs of Lasix Sat and Sunday, swelling is better, and had lost 4 labs since then, she would like to know if she should go back to Lasix 1 tab daily now. Also she mentioned that she will stopped taking her Coumadin starting today as she has upcoming Epidural on Friday, no one has instructed her to do it and pt stated \"I'm just going by on what I was instructed before when I had my epidural before\", she had not spoke with our office or her Coumadin Clinic about this procedure, advise pt not to stopped Coumadin without talking to her Coumadin Clinic or our office, pt reports she will contact Coumadin Clinic today and will get further instructions    To Dr Yobany MURRAY to [facility]   "

## 2019-10-29 ENCOUNTER — ANTICOAGULATION MONITORING (OUTPATIENT)
Dept: VASCULAR LAB | Facility: MEDICAL CENTER | Age: 79
End: 2019-10-29

## 2019-10-29 DIAGNOSIS — Z79.01 CHRONIC ANTICOAGULATION: ICD-10-CM

## 2019-10-29 LAB — INR PPP: 2.3 (ref 2–3.5)

## 2019-10-29 NOTE — PROGRESS NOTES
Anticoagulation Summary  As of 10/29/2019    INR goal:   2.0-3.0   TTR:   74.2 % (4.3 y)   INR used for dosin.30 (10/29/2019)   Warfarin maintenance plan:   3.75 mg (2.5 mg x 1.5) every Mon, Wed, Fri; 2.5 mg (2.5 mg x 1) all other days   Weekly warfarin total:   21.25 mg   Plan last modified:   Ivone Barraza, PharmD (2019)   Next INR check:   2019   Target end date:   Indefinite    Indications    Atrial fibrillation (HCC) (Resolved) [I48.91]  Chronic anticoagulation [Z79.01]             Anticoagulation Episode Summary     INR check location:   Home Draw    Preferred lab:       Send INR reminders to:       Comments:   Winston YEAGER      Anticoagulation Care Providers     Provider Role Specialty Phone number    Lizzy Haywood M.D. Referring Cardiology [phone]    [facility] Responsible  [phone]    Vladimir Taylor, PharmD Responsible          Anticoagulation Patient Findings    Spoke to patient on the phone.   INR  therapeutic.   Denies signs/symptoms of bleeding and/or thrombosis.   Denies changes to diet or medications.   Follow up appointment in 1 week(s).    Hold for epidural 2019 and eat more greens with vitamin K.     Fran Leyva, PharmD

## 2019-10-29 NOTE — TELEPHONE ENCOUNTER
Lizzy Haywood M.D.  You 1 hour ago (7:18 AM)      Sorry, I meant dietary potassium :-)    Routing comment        Called pt, discussed Dr Haywood's recommendations, pt will get blood test done in [facility]. BMP ordered

## 2019-10-30 ENCOUNTER — HOSPITAL ENCOUNTER (OUTPATIENT)
Dept: LAB | Facility: MEDICAL CENTER | Age: 79
End: 2019-10-30
Attending: INTERNAL MEDICINE
Payer: MEDICARE

## 2019-10-30 ENCOUNTER — OFFICE VISIT (OUTPATIENT)
Dept: MEDICAL GROUP | Facility: PHYSICIAN GROUP | Age: 79
End: 2019-10-30
Payer: MEDICARE

## 2019-10-30 VITALS
HEIGHT: 64 IN | WEIGHT: 195 LBS | RESPIRATION RATE: 14 BRPM | TEMPERATURE: 98.1 F | BODY MASS INDEX: 33.29 KG/M2 | DIASTOLIC BLOOD PRESSURE: 50 MMHG | SYSTOLIC BLOOD PRESSURE: 126 MMHG | OXYGEN SATURATION: 92 % | HEART RATE: 100 BPM

## 2019-10-30 DIAGNOSIS — I25.10 CORONARY ARTERY DISEASE INVOLVING NATIVE CORONARY ARTERY OF NATIVE HEART WITHOUT ANGINA PECTORIS: ICD-10-CM

## 2019-10-30 DIAGNOSIS — N83.201 CYSTS OF BOTH OVARIES: ICD-10-CM

## 2019-10-30 DIAGNOSIS — Z79.899 ON POTASSIUM WASTING DIURETIC THERAPY: ICD-10-CM

## 2019-10-30 DIAGNOSIS — I48.19 PERSISTENT ATRIAL FIBRILLATION (HCC): ICD-10-CM

## 2019-10-30 DIAGNOSIS — M48.00 SPINAL STENOSIS, MULTILEVEL: ICD-10-CM

## 2019-10-30 DIAGNOSIS — N83.202 CYSTS OF BOTH OVARIES: ICD-10-CM

## 2019-10-30 DIAGNOSIS — R60.9 PERIPHERAL EDEMA: ICD-10-CM

## 2019-10-30 DIAGNOSIS — Z95.0 CARDIAC PACEMAKER IN SITU: ICD-10-CM

## 2019-10-30 LAB
ANION GAP SERPL CALC-SCNC: 10 MMOL/L (ref 0–11.9)
BUN SERPL-MCNC: 28 MG/DL (ref 8–22)
CALCIUM SERPL-MCNC: 9.2 MG/DL (ref 8.5–10.5)
CHLORIDE SERPL-SCNC: 101 MMOL/L (ref 96–112)
CO2 SERPL-SCNC: 27 MMOL/L (ref 20–33)
CREAT SERPL-MCNC: 1.23 MG/DL (ref 0.5–1.4)
GLUCOSE SERPL-MCNC: 186 MG/DL (ref 65–99)
POTASSIUM SERPL-SCNC: 4.9 MMOL/L (ref 3.6–5.5)
SODIUM SERPL-SCNC: 138 MMOL/L (ref 135–145)

## 2019-10-30 PROCEDURE — 36415 COLL VENOUS BLD VENIPUNCTURE: CPT

## 2019-10-30 PROCEDURE — 80048 BASIC METABOLIC PNL TOTAL CA: CPT

## 2019-10-30 PROCEDURE — 99214 OFFICE O/P EST MOD 30 MIN: CPT | Performed by: FAMILY MEDICINE

## 2019-10-30 RX ORDER — FUROSEMIDE 40 MG/1
80 TABLET ORAL DAILY
Qty: 180 TAB | Refills: 3 | Status: SHIPPED
Start: 2019-10-30 | End: 2020-01-10

## 2019-10-30 RX ORDER — MAGNESIUM OXIDE 400 MG/1
400 TABLET ORAL DAILY
COMMUNITY
End: 2019-12-09 | Stop reason: SDUPTHER

## 2019-10-30 NOTE — PROGRESS NOTES
"Patient comes in with several issues.  She had an MRI of her low back which showed quite a bit of degenerative change and spinal stenosis.  She has seen Dr. Clement who wants her to have epidurals done with Dr. Haji.  She has stopped her blood thinners in preparation for that.  She was found on her back MRI to have possible mass in the pelvis.  A CAT scan was recommended.  This was ordered by Dr. Hardin, her gynecologist, and the CAT scan shows stable appearing cysts on her ovaries which have been the same since 2012.  This is good news.  This would point toward the cyst probably being benign.  She has no pelvic pain vaginal discharge or bleeding.  She has had a hysterectomy but she still has her ovaries.  Dr. Hardin does not recommend that she have surgery for these cysts.  I agree with him.  She developed some diarrhea in spite of cutting her magnesium down to 200 mg a day.  I want her to stop the magnesium completely.  She does not have blood in her bowels.  She does not have fever or chills.  She has no chest pains no shortness of breath.  She had developed some peripheral edema.  She was advised to increase her furosemide to 2 of the 40 mg strength daily and it helped quite a bit.  She wonders if she should continue with that dose.    I reviewed the following    Past Medical History:   Diagnosis Date   • A-fib (HCC)    • Anesthesia     \"Tachycardia for 5 days after cataract surgery\"   • Anticoagulant long-term use 1/12/2012   • Arthritis     Knees, hips   • Asthma     with pneumonia, nothing for 3 years   • Atrial fibrillation (HCC)    • Backpain     R hip   • Bowel habit changes     diarrhea   • Breath shortness     with exertion O2  2l/m PRN, hasnt used for 3 years   • Bronchitis Nov, 2013   • CAD (coronary artery disease)    • Depression    • Glaucoma 5/3/2011   • Hematoma complicating a procedure 11/3/2012   • Hemorrhagic disorder (HCC)     bruising/coumadin   • High cholesterol    • Hypertension    • " Hypothyroid    • Lupus (HCC)    • Macular degeneration    • Menopause 1/12/2012   • Mitral regurgitation 10/30/2012   • Obesity 1/12/2012   • Pacemaker 2018   • Pneumonia feb,2013   • Pre-syncope 6/29/2018   • Pulmonary hypertension (HCC) 10/30/2012   • PVC's (premature ventricular contractions) 1/12/2012   • Senile nuclear sclerosis    • Spinal stenosis of lumbar region at multiple levels    • Unspecified cataract     repaired bilateral   • Unspecified urinary incontinence         Past Surgical History:   Procedure Laterality Date   • IRRIGATION & DEBRIDEMENT ORTHO Right 2/14/2019    Procedure: IRRIGATION & DEBRIDEMENT ORTHO-HIP WOUND ;  Surgeon: Vladimir Lee M.D.;  Location: SURGERY [facility];  Service: Orthopedics   • HIP ARTH ANTERIOR TOTAL Right 1/17/2019    Procedure: HIP ARTHROPLASTY ANTERIOR TOTAL;  Surgeon: Juan C Mercedes M.D.;  Location: [facility];  Service: Orthopedics   • PACEMAKER INSERTION  06/30/2018    Dual Chamber   • KNEE ARTHROPLASTY TOTAL Right 6/23/2016    Procedure: KNEE ARTHROPLASTY TOTAL;  Surgeon: Heriberto Lozada M.D.;  Location: [facility];  Service:    • KNEE ARTHROPLASTY TOTAL Left 5/28/2015    Procedure: KNEE ARTHROPLASTY TOTAL;  Surgeon: Heriberto Lozada M.D.;  Location: [facility];  Service:    • CATARACT PHACO WITH IOL Right 5/5/2015    Procedure: IOL OD - STANDARD;  Surgeon: Dmitry Bejarano M.D.;  Location: [facility];  Service:    • CATARACT PHACO WITH IOL  4/21/2015    Performed by Dmitry Bejarano M.D. at [facility]   • RECOVERY  11/30/2010    Performed by SURGERY, CATH-RECOVERY at [facility] SAME DAY [facility]   • COLONOSCOPY  2008    Normal    [facility]   • ABDOMINAL HYSTERECTOMY TOTAL  April 15,1975    still has ovaries   • OPEN REDUCTION      left ankle   • OTHER      Removed pins from left ankle   • OTHER CARDIAC SURGERY  12/2017 and 07/19/2018     Cardiac Ablation   • TONSILLECTOMY AND  "ADENOIDECTOMY         Allergies   Allergen Reactions   • Amiodarone Hives     Throat and tongue itching   • Bactrim Shortness of Breath   • Cipro Xr Swelling   • Metoprolol Swelling     Causes throat swelling   • Morphine Unspecified     Hallucinations   • Phytoplex Z-Guard [Petrolatum-Zinc Oxide] Unspecified     \"causes burning\"   • Pseudoephedrine Palpitations   • Qvar [Beclomethasone Dipropionate] Unspecified     Pressure on heart     • Vibramycin Shortness of Breath   • Atorvastatin Calcium-Polysorbate 80 Unspecified     Muscle aches     • Augmentin Unspecified     Unknown reaction   • Diltiazem Rash     rash   • Flecainide Unspecified     dizziness   • Keflex Unspecified     Pt states \"Unsure\".   • Mucinex Unspecified     GI Distress     • Tramadol Unspecified     crying   • Atorvastatin Myalgia   • Tape Rash     Paper tape okay       Current Outpatient Medications   Medication Sig Dispense Refill   • furosemide (LASIX) 40 MG Tab Take 2 Tabs by mouth every day. Indications: Edema, High Blood Pressure Disorder 180 Tab 3   • magnesium oxide (MAG-OX) 400 MG Tab Take 400 mg by mouth every day.     • sennosides (SENOKOT) 8.6 MG Tab Take 8.6 mg by mouth 1 time daily as needed.     • estradiol (ESTRACE) 0.1 MG/GM vaginal cream      • ipratropium (ATROVENT) 0.03 % Solution SPRAY 2 SPRAYS IN NOSE EVERY 12 HOURS. 30 mL 3   • bacitracin-polymyxin b (POLYSPORIN) 500-15771 UNIT/GM Ointment Apply twice a day to infected sores on skin. 15 g 1   • levothyroxine (SYNTHROID) 50 MCG Tab Take 1 Tab by mouth every morning. ON A EMPTY STOMACH 90 Tab 3   • lisinopril (PRINIVIL) 5 MG Tab Take 1 Tab by mouth every day. 100 Tab 1   • raNITidine (ZANTAC) 150 MG Tab TAKE ONE TABLET BY MOUTH TWICE DAILY 180 Tab 3   • warfarin (COUMADIN) 2.5 MG Tab Take 1-1.5 Tabs by mouth every day. As directed by the [facility] 135 Tab 2   • atenolol (TENORMIN) 50 MG Tab Take 1 Tab by mouth 2 times a day. 180 Tab 3   • Misc. Devices " Misc Dispense test strips to be used with home INR testing for warfarin anticoagulation dose regulation 100 Strip 2   • Alpha-D-Galactosidase (BEANO) Tab Take 2 tablet by mouth as needed. swallow or chew 2 tablets.     • Acetaminophen 500 MG Cap Take 2 Caps by mouth 3 times a day.     • spironolactone (ALDACTONE) 50 MG Tab TAKE  ONE TABLET BY MOUTH EVERY MORNING AND EVERY EVENING 180 Tab 3   • PREBIOTIC PRODUCT PO Take 2 Tabs by mouth every day.     • sertraline (ZOLOFT) 50 MG Tab Take 1 Tab by mouth every day. 90 Tab 3   • Lutein 20 MG Cap Take 1 Cap by mouth every day. 90 Cap 3   • vitamin D (CHOLECALCIFEROL) 1000 UNIT TABS Take 2,000 Units by mouth every day.     • Mirabegron ER (MYRBETRIQ) 50 MG TABLET SR 24 HR Take  by mouth.     • nystatin (MYCOSTATIN) powder Apply up to 4 times a day to groin rash after washing with mild soap and water (Patient not taking: Reported on 10/30/2019) 30 g 1   • estradiol (ESTRACE) 2 MG Tab TAKE ONE TABLET BY MOUTH EVERY DAY 90 Tab 3     No current facility-administered medications for this visit.         Family History   Problem Relation Age of Onset   • Stroke Mother    • Diabetes Father    • Stroke Sister    • Heart Disease Brother    • Stroke Sister    • GI Disease Daughter         Crohn's Disease   • Heart Disease Daughter         CHF   • Other Daughter         Chronic Pain--Lymphedema   • Cancer Paternal Aunt        Social History     Socioeconomic History   • Marital status:      Spouse name: Not on file   • Number of children: Not on file   • Years of education: Not on file   • Highest education level: Not on file   Occupational History   • Not on file   Social Needs   • Financial resource strain: Not on file   • Food insecurity:     Worry: Not on file     Inability: Not on file   • Transportation needs:     Medical: Not on file     Non-medical: Not on file   Tobacco Use   • Smoking status: Never Smoker   • Smokeless tobacco: Never Used   Substance and Sexual  Activity   • Alcohol use: No   • Drug use: No   • Sexual activity: Not Currently     Partners: Male     Birth control/protection: Post-Menopausal   Lifestyle   • Physical activity:     Days per week: Not on file     Minutes per session: Not on file   • Stress: Not on file   Relationships   • Social connections:     Talks on phone: Not on file     Gets together: Not on file     Attends [employer] service: Not on file     Active member of club or organization: Not on file     Attends meetings of clubs or organizations: Not on file     Relationship status: Not on file   • Intimate partner violence:     Fear of current or ex partner: Not on file     Emotionally abused: Not on file     Physically abused: Not on file     Forced sexual activity: Not on file   Other Topics Concern   • Not on file   Social History Narrative   • Not on file      Physical Exam   Constitutional: She is oriented. She appears well-developed and well-nourished. No distress.   HENT:  Head: Normocephalic and atraumatic.   Right Ear: External ear normal. Ear canal and TM normal   Left Ear: External ear normal. Ear canal and TM normal  Nose: Nose normal.   Mouth/Throat: Oropharynx is clear and moist.   Eyes: Conjunctivae and extraocular motions are normal. Pupils are equal, round, and reactive to light. Fundi benign bilaterally   Neck: No thyromegaly present.   Cardiovascular: Normal rate, regular rhythm, normal heart sounds and intact distal pulses.  Exam reveals no gallop.    No murmur heard.  Pulmonary/Chest: Effort normal and breath sounds normal. No respiratory distress. She has no wheezes. She has no rales.   Abdominal: Soft. Bowel sounds are normal. No hepatosplenomegaly She exhibits no distension. No tenderness. She has no rebound and no guarding.   Musculoskeletal: Normal range of motion. She exhibits only trace ankle edema and no tenderness.   Lymphadenopathy:     She has no cervical adenopathy.   No supraclavicular adenopathy  Neurological:  She is alert and oriented. She has normal reflexes.        Babinskis downgoing bilaterally   Skin: The sore that have been on her buttocks is healed very well with Wound Care and good management from her .  Skin is warm and dry. No rash noted. No erythema.   Psychiatric: She has a normal mood and appropriate affect. Her behavior is normal. Judgment and thought content normal.     1. Cysts of both ovaries   continue observation as per Dr. Hardin.   2. Cardiac pacemaker in situ   doing well   3. Coronary artery disease involving native coronary artery of native heart without angina pectoris   doing well.  No chest pains   4. Spinal stenosis, multilevel   continue to see Dr. Clement and Dr. Haji   5. Persistent atrial fibrillation (HCC)   not fibrillating at present time   6. Peripheral edema  furosemide (LASIX) 40 MG Tab--continue to p.o. daily     Please note that this dictation was created using voice recognition software. I have worked with consultants from the vendor as well as technical experts from [facility] to optimize the interface. I have made every reasonable attempt to correct obvious errors, but I expect that there are errors of grammar and possibly content that I did not discover before finalizing the note.    Recheck with me 2 months or as needed

## 2019-10-31 ENCOUNTER — TELEPHONE (OUTPATIENT)
Dept: CARDIOLOGY | Facility: MEDICAL CENTER | Age: 79
End: 2019-10-31

## 2019-10-31 NOTE — TELEPHONE ENCOUNTER
Result Notes for Basic Metabolic Panel   Notes recorded by Lizzy Haywood M.D. on 10/31/2019 at 10:27 AM PDT  Kidneys and K fine - meds as is     Called pt, discussed labs per Dr Haywood, pt verbalizes understanding

## 2019-11-04 ENCOUNTER — ANTICOAGULATION MONITORING (OUTPATIENT)
Dept: VASCULAR LAB | Facility: MEDICAL CENTER | Age: 79
End: 2019-11-04

## 2019-11-04 DIAGNOSIS — Z79.01 CHRONIC ANTICOAGULATION: ICD-10-CM

## 2019-11-04 LAB — INR PPP: 1.6 (ref 2–3.5)

## 2019-11-05 NOTE — PROGRESS NOTES
Anticoagulation Summary  As of 2019    INR goal:   2.0-3.0   TTR:   74.1 % (4.3 y)   INR used for dosin.60! (2019)   Warfarin maintenance plan:   3.75 mg (2.5 mg x 1.5) every Mon, Wed, Fri; 2.5 mg (2.5 mg x 1) all other days   Weekly warfarin total:   21.25 mg   Plan last modified:   Fran Leyva PharmD (2019)   Next INR check:   2019   Target end date:   Indefinite    Indications    Atrial fibrillation (HCC) (Resolved) [I48.91]  Chronic anticoagulation [Z79.01]             Anticoagulation Episode Summary     INR check location:   Home Draw    Preferred lab:       Send INR reminders to:       Comments:   Winston YEAGER      Anticoagulation Care Providers     Provider Role Specialty Phone number    Lizzy Haywood M.D. Referring Cardiology [phone]    [facility] Responsible  [phone]    Vladimir Taylor, PharmD Responsible          Anticoagulation Patient Findings    Spoke to patient on the phone.   INR  sub-therapeutic.   Denies signs/symptoms of bleeding and/or thrombosis.   Denies changes to diet or medications.   Follow up appointment in 1 week(s).    Extra 0.5tab this week then continue weekly warfarin dose as noted      Fran Leyva, PharmD

## 2019-11-11 ENCOUNTER — ANTICOAGULATION MONITORING (OUTPATIENT)
Dept: VASCULAR LAB | Facility: MEDICAL CENTER | Age: 79
End: 2019-11-11

## 2019-11-11 DIAGNOSIS — Z79.01 CHRONIC ANTICOAGULATION: ICD-10-CM

## 2019-11-11 LAB — INR PPP: 2.9 (ref 2–3.5)

## 2019-11-11 NOTE — PROGRESS NOTES
OP Telephone Anticoagulation Service Note    Date: 2019      Anticoagulation Summary  As of 2019    INR goal:   2.0-3.0   TTR:   74.0 % (4.3 y)   INR used for dosin.90 (2019)   Warfarin maintenance plan:   3.75 mg (2.5 mg x 1.5) every Mon, Wed, Fri; 2.5 mg (2.5 mg x 1) all other days   Weekly warfarin total:   21.25 mg   Plan last modified:   Fran Leyva, PharmD (2019)   Next INR check:   2019   Target end date:   Indefinite    Indications    Atrial fibrillation (HCC) (Resolved) [I48.91]  Chronic anticoagulation [Z79.01]             Anticoagulation Episode Summary     INR check location:   Home Draw    Preferred lab:       Send INR reminders to:       Comments:   Winston YEAGER      Anticoagulation Care Providers     Provider Role Specialty Phone number    Lizzy Haywood M.D. Referring Cardiology [phone]    [facility] Anticoagulation Services Responsible  [phone]    Vladimir Taylor, PharmD Responsible          Anticoagulation Patient Findings        Plan: Spoke with patient on the phone. Patient is  therapeutic today. Confirmed dosing. Will continue dosing as outlined. Will follow-up with patient in 1 week(s).      Ivone Barraza, PeterD

## 2019-11-18 ENCOUNTER — OFFICE VISIT (OUTPATIENT)
Dept: URGENT CARE | Facility: PHYSICIAN GROUP | Age: 79
End: 2019-11-18
Payer: MEDICARE

## 2019-11-18 VITALS
TEMPERATURE: 98 F | RESPIRATION RATE: 16 BRPM | DIASTOLIC BLOOD PRESSURE: 62 MMHG | HEART RATE: 90 BPM | OXYGEN SATURATION: 96 % | HEIGHT: 64 IN | WEIGHT: 196 LBS | BODY MASS INDEX: 33.46 KG/M2 | SYSTOLIC BLOOD PRESSURE: 110 MMHG

## 2019-11-18 DIAGNOSIS — J02.9 SORE THROAT: ICD-10-CM

## 2019-11-18 LAB
INT CON NEG: NEGATIVE
INT CON POS: POSITIVE
S PYO AG THROAT QL: NEGATIVE

## 2019-11-18 PROCEDURE — 87880 STREP A ASSAY W/OPTIC: CPT | Performed by: EMERGENCY MEDICINE

## 2019-11-18 PROCEDURE — 99214 OFFICE O/P EST MOD 30 MIN: CPT | Performed by: EMERGENCY MEDICINE

## 2019-11-18 ASSESSMENT — ENCOUNTER SYMPTOMS
COUGH: 0
SHORTNESS OF BREATH: 0
TROUBLE SWALLOWING: 0
SWOLLEN GLANDS: 0
HEADACHES: 0
HOARSE VOICE: 0
SINUS PAIN: 0

## 2019-11-18 NOTE — PROGRESS NOTES
Subjective:      Donte Magaña is a 79 y.o. female who presents with Sore Throat (Sore throat x3 days, ear pain x2 days)            Pharyngitis    This is a new problem. Episode onset: 3 days. The problem has been unchanged. Neither side of throat is experiencing more pain than the other. There has been no fever. The pain is moderate. Associated symptoms include congestion and ear pain. Pertinent negatives include no coughing, headaches, hoarse voice, plugged ear sensation, shortness of breath, swollen glands or trouble swallowing. She has had no exposure to strep. Treatments tried: lozenges. The treatment provided mild relief.       Review of Systems   HENT: Positive for congestion and ear pain. Negative for hoarse voice, nosebleeds, sinus pain, tinnitus and trouble swallowing.    Respiratory: Negative for cough and shortness of breath.    Skin: Negative for rash.   Neurological: Negative for headaches.   Endo/Heme/Allergies: Positive for environmental allergies.     PMH:  has a past medical history of A-fib ([facility]), Anesthesia, Anticoagulant long-term use (1/12/2012), Arthritis, Asthma, Atrial fibrillation ([facility]), Backpain, Bowel habit changes, Breath shortness, Bronchitis (Nov, 2013), CAD (coronary artery disease), Depression, Glaucoma (5/3/2011), Hematoma complicating a procedure (11/3/2012), Hemorrhagic disorder ([facility]), High cholesterol, Hypertension, Hypothyroid, Lupus ([facility]), Macular degeneration, Menopause (1/12/2012), Mitral regurgitation (10/30/2012), Obesity (1/12/2012), Pacemaker (2018), Pneumonia (feb,2013), Pre-syncope (6/29/2018), Pulmonary hypertension ([facility]) (10/30/2012), PVC's (premature ventricular contractions) (1/12/2012), Senile nuclear sclerosis, Spinal stenosis of lumbar region at multiple levels, Unspecified cataract, and Unspecified urinary incontinence.  MEDS:   Current Outpatient Medications:   •  Mirabegron ER (MYRBETRIQ) 50 MG TABLET SR 24 HR, Take 50 mg by mouth every day., Disp: 90 Tab,  Rfl: 3  •  magnesium oxide (MAG-OX) 400 MG Tab, Take 400 mg by mouth every day., Disp: , Rfl:   •  sennosides (SENOKOT) 8.6 MG Tab, Take 8.6 mg by mouth 1 time daily as needed., Disp: , Rfl:   •  estradiol (ESTRACE) 0.1 MG/GM vaginal cream, , Disp: , Rfl:   •  ipratropium (ATROVENT) 0.03 % Solution, SPRAY 2 SPRAYS IN NOSE EVERY 12 HOURS., Disp: 30 mL, Rfl: 3  •  bacitracin-polymyxin b (POLYSPORIN) 500-64569 UNIT/GM Ointment, Apply twice a day to infected sores on skin., Disp: 15 g, Rfl: 1  •  levothyroxine (SYNTHROID) 50 MCG Tab, Take 1 Tab by mouth every morning. ON A EMPTY STOMACH, Disp: 90 Tab, Rfl: 3  •  lisinopril (PRINIVIL) 5 MG Tab, Take 1 Tab by mouth every day., Disp: 100 Tab, Rfl: 1  •  raNITidine (ZANTAC) 150 MG Tab, TAKE ONE TABLET BY MOUTH TWICE DAILY, Disp: 180 Tab, Rfl: 3  •  warfarin (COUMADIN) 2.5 MG Tab, Take 1-1.5 Tabs by mouth every day. As [facility], Disp: 135 Tab, Rfl: 2  •  atenolol (TENORMIN) 50 MG Tab, Take 1 Tab by mouth 2 times a day., Disp: 180 Tab, Rfl: 3  •  nystatin (MYCOSTATIN) powder, Apply up to 4 times a day to groin rash after washing with mild soap and water, Disp: 30 g, Rfl: 1  •  Misc. Devices Misc, Dispense test strips to be used with home INR testing for warfarin anticoagulation dose regulation, Disp: 100 Strip, Rfl: 2  •  Alpha-D-Galactosidase (BEANO) Tab, Take 2 tablet by mouth as needed. swallow or chew 2 tablets., Disp: , Rfl:   •  Acetaminophen 500 MG Cap, Take 2 Caps by mouth 3 times a day., Disp: , Rfl:   •  estradiol (ESTRACE) 2 MG Tab, TAKE ONE TABLET BY MOUTH EVERY DAY, Disp: 90 Tab, Rfl: 3  •  spironolactone (ALDACTONE) 50 MG Tab, TAKE  ONE TABLET BY MOUTH EVERY MORNING AND EVERY EVENING, Disp: 180 Tab, Rfl: 3  •  PREBIOTIC PRODUCT PO, Take 2 Tabs by mouth every day., Disp: , Rfl:   •  sertraline (ZOLOFT) 50 MG Tab, Take 1 Tab by mouth every day., Disp: 90 Tab, Rfl: 3  •  Lutein 20 MG Cap, Take 1 Cap by mouth every day., Disp: 90  "Cap, Rfl: 3  •  vitamin D (CHOLECALCIFEROL) 1000 UNIT TABS, Take 2,000 Units by mouth every day., Disp: , Rfl:   •  furosemide (LASIX) 40 MG Tab, Take 2 Tabs by mouth every day. Indications: Edema, High Blood Pressure Disorder (Patient not taking: Reported on 11/18/2019), Disp: 180 Tab, Rfl: 3  ALLERGIES:   Allergies   Allergen Reactions   • Amiodarone Hives     Throat and tongue itching   • Bactrim Shortness of Breath   • Cipro Xr Swelling   • Metoprolol Swelling     Causes throat swelling   • Morphine Unspecified     Hallucinations   • Phytoplex Z-Guard [Petrolatum-Zinc Oxide] Unspecified     \"causes burning\"   • Pseudoephedrine Palpitations   • Qvar [Beclomethasone Dipropionate] Unspecified     Pressure on heart     • Vibramycin Shortness of Breath   • Atorvastatin Calcium-Polysorbate 80 Unspecified     Muscle aches     • Augmentin Unspecified     Unknown reaction   • Diltiazem Rash     rash   • Flecainide Unspecified     dizziness   • Keflex Unspecified     Pt states \"Unsure\".   • Mucinex Unspecified     GI Distress     • Tramadol Unspecified     crying   • Atorvastatin Myalgia   • Tape Rash     Paper tape okay     SURGHX:   Past Surgical History:   Procedure Laterality Date   • IRRIGATION & DEBRIDEMENT ORTHO Right 2/14/2019    Procedure: IRRIGATION & DEBRIDEMENT ORTHO-HIP WOUND ;  Surgeon: Vladimir Lee M.D.;  Location: [facility];  Service: Orthopedics   • HIP ARTH ANTERIOR TOTAL Right 1/17/2019    Procedure: HIP ARTHROPLASTY ANTERIOR TOTAL;  Surgeon: Juan C Mercedes M.D.;  Location: SURGERY [facility];  Service: Orthopedics   • PACEMAKER INSERTION  06/30/2018    Dual Chamber   • KNEE ARTHROPLASTY TOTAL Right 6/23/2016    Procedure: KNEE ARTHROPLASTY TOTAL;  Surgeon: Heriberto Lozada M.D.;  Location: [facility];  Service:    • KNEE ARTHROPLASTY TOTAL Left 5/28/2015    Procedure: KNEE ARTHROPLASTY TOTAL;  Surgeon: Heriberto Lozada M.D.;  Location: [facility];  " "Service:    • CATARACT PHACO WITH IOL Right 5/5/2015    Procedure: IOL OD - STANDARD;  Surgeon: Dmitry Bejarano M.D.;  Location: SURGERY [facility];  Service:    • CATARACT PHACO WITH IOL  4/21/2015    Performed by Dmitry Bejarano M.D. at SURGERY [facility] ORS   • RECOVERY  11/30/2010    Performed by SURGERY, [facility]-RECOVERY at SURGERY SAME DAY [facility]   • COLONOSCOPY  2008    Normal    [facility]   • ABDOMINAL HYSTERECTOMY TOTAL  April 15,1975    still has ovaries   • OPEN REDUCTION      left ankle   • OTHER      Removed pins from left ankle   • OTHER CARDIAC SURGERY  12/2017 and 07/19/2018     Cardiac Ablation   • TONSILLECTOMY AND ADENOIDECTOMY       SOCHX:  reports that she has never smoked. She has never used smokeless tobacco. She reports that she does not drink alcohol or use drugs.  FH: family history includes Cancer in her paternal aunt; Diabetes in her father; GI Disease in her daughter; Heart Disease in her brother and daughter; Other in her daughter; Stroke in her mother, sister, and sister.     Objective:     /62   Pulse 90   Temp 36.7 °C (98 °F)   Resp 16   Ht 1.626 m (5' 4\")   Wt 88.9 kg (196 lb)   LMP 01/01/1993   SpO2 96%   BMI 33.64 kg/m²      Physical Exam  Constitutional:       General: She is not in acute distress.     Appearance: She is well-developed. She is not toxic-appearing.   HENT:      Head: Normocephalic.      Right Ear: Tympanic membrane and ear canal normal.      Left Ear: Tympanic membrane and ear canal normal.      Nose: No rhinorrhea.      Right Nostril: No epistaxis.      Left Nostril: No epistaxis.      Mouth/Throat:      Lips: Pink.      Mouth: Mucous membranes are moist.      Tongue: No lesions.      Palate: No lesions.      Pharynx: Posterior oropharyngeal erythema present. No pharyngeal swelling, oropharyngeal exudate or uvula swelling.   Eyes:      Conjunctiva/sclera: Conjunctivae normal.   Neck:      Musculoskeletal: Neck supple.      " Trachea: Trachea normal.   Cardiovascular:      Rate and Rhythm: Normal rate and regular rhythm.      Heart sounds: Normal heart sounds.   Pulmonary:      Effort: Pulmonary effort is normal.      Breath sounds: Normal breath sounds.   Lymphadenopathy:      Cervical: No cervical adenopathy.   Skin:     General: Skin is warm and dry.   Neurological:      Mental Status: She is alert and oriented to person, place, and time.   Psychiatric:         Behavior: Behavior is cooperative.            Suspect viral versus allergy associated symptoms     Assessment/Plan:       1. Sore throat  Recommended supportive care measures, including rest, increasing oral fluid intake and use of over-the-counter medications for relief of symptoms.  negative- POCT Rapid Strep A

## 2019-11-23 DIAGNOSIS — L89.309 PRESSURE INJURY OF SKIN OF BUTTOCK, UNSPECIFIED INJURY STAGE, UNSPECIFIED LATERALITY: ICD-10-CM

## 2019-11-25 ENCOUNTER — ANTICOAGULATION MONITORING (OUTPATIENT)
Dept: MEDICAL GROUP | Facility: MEDICAL CENTER | Age: 79
End: 2019-11-25

## 2019-11-25 DIAGNOSIS — Z79.01 CHRONIC ANTICOAGULATION: ICD-10-CM

## 2019-11-25 LAB — INR PPP: 3.4 (ref 2–3.5)

## 2019-11-25 NOTE — PROGRESS NOTES
Anticoagulation Summary  As of 11/25/2019    INR goal:   2.0-3.0   TTR:   73.6 % (4.4 y)   INR used for dosing:   3.40! (11/25/2019)   Warfarin maintenance plan:   3.75 mg (2.5 mg x 1.5) every Mon, Wed, Fri; 2.5 mg (2.5 mg x 1) all other days   Weekly warfarin total:   21.25 mg   Plan last modified:   Fran Leyva, PharmD (11/4/2019)   Next INR check:   12/2/2019   Target end date:   Indefinite    Indications    Atrial fibrillation (HCC) (Resolved) [I48.91]  Chronic anticoagulation [Z79.01]             Anticoagulation Episode Summary     INR check location:   Home Draw    Preferred lab:       Send INR reminders to:       Comments:   Winston YEAGER      Anticoagulation Care Providers     Provider Role Specialty Phone number    Lizzy Haywood M.D. Referring Cardiology [phone]    [facility] Responsible  [phone]    Vladimir Taylor, PharmD Responsible          Anticoagulation Patient Findings          HPI:  Donte Magaña, on anticoagulation therapy with warfarin for Afib  Changes to current medical/health status since last appt: None  Denies signs/symptoms of bleeding and/or thrombosis since the last appt.    Denies any interval changes to diet  Denies any interval changes to medications since last appt.   Denies any complications or cost restrictions with current therapy.     A/P   INR SUPRA-therapeutic.     Patient has already taken today's dose, so hold warfarin tomorrow and then Pt is to continue with current warfarin dosing regimen.    Next INR in 1 week.    Cheri Akbar, Pharmacy Intern

## 2019-12-02 ENCOUNTER — ANTICOAGULATION MONITORING (OUTPATIENT)
Dept: VASCULAR LAB | Facility: MEDICAL CENTER | Age: 79
End: 2019-12-02

## 2019-12-02 ENCOUNTER — ANTICOAGULATION MONITORING (OUTPATIENT)
Dept: MEDICAL GROUP | Facility: MEDICAL CENTER | Age: 79
End: 2019-12-02

## 2019-12-02 DIAGNOSIS — Z79.01 CHRONIC ANTICOAGULATION: ICD-10-CM

## 2019-12-02 LAB — INR PPP: 2.6 (ref 2–3.5)

## 2019-12-02 NOTE — PROGRESS NOTES
Anticoagulation Summary  As of 2019    INR goal:   2.0-3.0   TTR:   73.5 % (4.4 y)   INR used for dosin.60 (2019)   Warfarin maintenance plan:   3.75 mg (2.5 mg x 1.5) every Mon, Wed, Fri; 2.5 mg (2.5 mg x 1) all other days   Weekly warfarin total:   21.25 mg   Plan last modified:   Fran Leyva, PharmD (2019)   Next INR check:   2019   Target end date:   Indefinite    Indications    Atrial fibrillation (HCC) (Resolved) [I48.91]  Chronic anticoagulation [Z79.01]             Anticoagulation Episode Summary     INR check location:   Home Draw    Preferred lab:       Send INR reminders to:       Comments:   Winston YEAGER      Anticoagulation Care Providers     Provider Role Specialty Phone number    Lizzy Haywood M.D. Referring Cardiology [phone]    [facility] Responsible  [phone]    Vladimir Taylor, PharmD Responsible          Anticoagulation Patient Findings       Spoke with Mr. Magaña to report a therapeutic INR of 2.6. Continue current dosing regimen.  Follow up in 2 weeks, to reduce the risk of adverse events related to this high risk medication, warfarin.    Massiel Coleman, Clinical Pharmacist

## 2019-12-03 DIAGNOSIS — L29.9 ITCHING OF EAR: ICD-10-CM

## 2019-12-03 RX ORDER — TRIAMCINOLONE ACETONIDE 0.25 MG/ML
1 LOTION TOPICAL 2 TIMES DAILY
Qty: 1 BOTTLE | Refills: 1 | Status: SHIPPED | OUTPATIENT
Start: 2019-12-03 | End: 2021-01-07 | Stop reason: SDUPTHER

## 2019-12-09 ENCOUNTER — TELEPHONE (OUTPATIENT)
Dept: CARDIOLOGY | Facility: MEDICAL CENTER | Age: 79
End: 2019-12-09

## 2019-12-09 RX ORDER — MAGNESIUM OXIDE 400 MG/1
400 TABLET ORAL DAILY
Qty: 100 TAB | Refills: 3 | Status: SHIPPED
Start: 2019-12-09 | End: 2020-01-10

## 2019-12-09 NOTE — TELEPHONE ENCOUNTER
LA pt has been waiting since 11/30 for her med refills, it is still pending. She sent a [employer] msg on 12/7.

## 2019-12-09 NOTE — TELEPHONE ENCOUNTER
Was the patient seen in the last year in this department? Yes LOV 10/30/19    Does patient have an active prescription for medications requested? No     Received Request Via: Patient     Patient has been without this medication for 2 days due to the fact that Dr. Haywood is no longer with [facility].

## 2019-12-09 NOTE — TELEPHONE ENCOUNTER
Gómez, Lizzy Mackay M.D. 2 days ago         Dr. Haywood,   I need  to have two refills called in.  One is sertraline 50 mg tabs & the  other is for magnesium oxide 400 mg tabs.  Thank you.     Donte Magaña        Called pt, informed that Dr Lizzy Haywood no longer works for [facility], advise pt to follow up w/ PCP to refill above meds, she verbalizes understanding and will call Dr Price

## 2019-12-10 ENCOUNTER — OFFICE VISIT (OUTPATIENT)
Dept: URGENT CARE | Facility: PHYSICIAN GROUP | Age: 79
End: 2019-12-10
Payer: MEDICARE

## 2019-12-10 VITALS
HEART RATE: 82 BPM | WEIGHT: 196 LBS | DIASTOLIC BLOOD PRESSURE: 66 MMHG | SYSTOLIC BLOOD PRESSURE: 118 MMHG | TEMPERATURE: 97.6 F | HEIGHT: 64 IN | OXYGEN SATURATION: 97 % | BODY MASS INDEX: 33.46 KG/M2 | RESPIRATION RATE: 16 BRPM

## 2019-12-10 DIAGNOSIS — J06.9 VIRAL URI: ICD-10-CM

## 2019-12-10 PROCEDURE — 99213 OFFICE O/P EST LOW 20 MIN: CPT | Performed by: PHYSICIAN ASSISTANT

## 2019-12-10 RX ORDER — FLUTICASONE PROPIONATE 50 MCG
1 SPRAY, SUSPENSION (ML) NASAL DAILY
Qty: 1 BOTTLE | Refills: 0 | Status: SHIPPED
Start: 2019-12-10 | End: 2020-01-10

## 2019-12-10 ASSESSMENT — ENCOUNTER SYMPTOMS
SORE THROAT: 0
VOMITING: 0
EYE REDNESS: 0
ABDOMINAL PAIN: 0
FEVER: 0
EYE DISCHARGE: 0
RHINORRHEA: 1
SHORTNESS OF BREATH: 0
SINUS PAIN: 1
DIARRHEA: 0
NAUSEA: 0
DIZZINESS: 0
MYALGIAS: 0
COUGH: 0
CHILLS: 0
HEADACHES: 1

## 2019-12-10 NOTE — PROGRESS NOTES
Subjective:      Donte Magaña is a 79 y.o. female who presents with Sinus Pain (head [id number] )        URI    This is a new problem. The current episode started in the past 7 days (Started 4 days ago). The problem has been unchanged. There has been no fever. Associated symptoms include congestion, headaches, a plugged ear sensation, rhinorrhea, sinus pain and sneezing. Pertinent negatives include no abdominal pain, chest pain, coughing, diarrhea, ear pain, nausea, rash, sore throat or vomiting. She has tried acetaminophen for the symptoms. The treatment provided mild relief.       Review of Systems   Constitutional: Positive for malaise/fatigue. Negative for chills and fever.   HENT: Positive for congestion, rhinorrhea, sinus pain and sneezing. Negative for ear pain and sore throat.    Eyes: Negative for discharge and redness.   Respiratory: Negative for cough and shortness of breath.    Cardiovascular: Negative for chest pain.   Gastrointestinal: Negative for abdominal pain, diarrhea, nausea and vomiting.   Musculoskeletal: Negative for myalgias.   Skin: Negative for rash.   Neurological: Positive for headaches. Negative for dizziness.       PMH:  has a past medical history of A-fib ([facility]), Anesthesia, Anticoagulant long-term use (1/12/2012), Arthritis, Asthma, Atrial fibrillation ([facility]), Backpain, Bowel habit changes, Breath shortness, Bronchitis (Nov, 2013), CAD (coronary artery disease), Depression, Glaucoma (5/3/2011), Hematoma complicating a procedure (11/3/2012), Hemorrhagic disorder ([facility]), High cholesterol, Hypertension, Hypothyroid, Lupus ([facility]), Macular degeneration, Menopause (1/12/2012), Mitral regurgitation (10/30/2012), Obesity (1/12/2012), Pacemaker (2018), Pneumonia (feb,2013), Pre-syncope (6/29/2018), Pulmonary hypertension ([facility]) (10/30/2012), PVC's (premature ventricular contractions) (1/12/2012), Senile nuclear sclerosis, Spinal stenosis of lumbar region at multiple levels, Unspecified  cataract, and Unspecified urinary incontinence.  MEDS:   Current Outpatient Medications:   •  fluticasone (FLONASE) 50 MCG/ACT nasal spray, Spray 1 Spray in nose every day., Disp: 1 Bottle, Rfl: 0  •  sertraline (ZOLOFT) 50 MG Tab, Take 1 Tab by mouth every day., Disp: 90 Tab, Rfl: 3  •  magnesium oxide (MAG-OX) 400 MG Tab, Take 1 Tab by mouth every day., Disp: 100 Tab, Rfl: 3  •  Triamcinolone Acetonide 0.025 % Lotion, 1 Squirt by Apply externally route 2 Times a Day. Apply to itching ears., Disp: 1 Bottle, Rfl: 1  •  Mirabegron ER (MYRBETRIQ) 50 MG TABLET SR 24 HR, Take 50 mg by mouth every day., Disp: 90 Tab, Rfl: 3  •  sennosides (SENOKOT) 8.6 MG Tab, Take 8.6 mg by mouth 1 time daily as needed., Disp: , Rfl:   •  estradiol (ESTRACE) 0.1 MG/GM vaginal cream, , Disp: , Rfl:   •  ipratropium (ATROVENT) 0.03 % Solution, SPRAY 2 SPRAYS IN NOSE EVERY 12 HOURS., Disp: 30 mL, Rfl: 3  •  bacitracin-polymyxin b (POLYSPORIN) 500-95836 UNIT/GM Ointment, Apply twice a day to infected sores on skin., Disp: 15 g, Rfl: 1  •  levothyroxine (SYNTHROID) 50 MCG Tab, Take 1 Tab by mouth every morning. ON A EMPTY STOMACH, Disp: 90 Tab, Rfl: 3  •  lisinopril (PRINIVIL) 5 MG Tab, Take 1 Tab by mouth every day., Disp: 100 Tab, Rfl: 1  •  raNITidine (ZANTAC) 150 MG Tab, TAKE ONE TABLET BY MOUTH TWICE DAILY, Disp: 180 Tab, Rfl: 3  •  warfarin (COUMADIN) 2.5 MG Tab, Take 1-1.5 Tabs by mouth every day. As directed by the [facility], Disp: 135 Tab, Rfl: 2  •  atenolol (TENORMIN) 50 MG Tab, Take 1 Tab by mouth 2 times a day., Disp: 180 Tab, Rfl: 3  •  nystatin (MYCOSTATIN) powder, Apply up to 4 times a day to groin rash after washing with mild soap and water, Disp: 30 g, Rfl: 1  •  Misc. Devices Misc, Dispense test strips to be used with home INR testing for warfarin anticoagulation dose regulation, Disp: 100 Strip, Rfl: 2  •  Alpha-D-Galactosidase (BEANO) Tab, Take 2 tablet by mouth as needed. swallow or chew 2 tablets.,  "Disp: , Rfl:   •  Acetaminophen 500 MG Cap, Take 2 Caps by mouth 3 times a day., Disp: , Rfl:   •  estradiol (ESTRACE) 2 MG Tab, TAKE ONE TABLET BY MOUTH EVERY DAY, Disp: 90 Tab, Rfl: 3  •  spironolactone (ALDACTONE) 50 MG Tab, TAKE  ONE TABLET BY MOUTH EVERY MORNING AND EVERY EVENING, Disp: 180 Tab, Rfl: 3  •  PREBIOTIC PRODUCT PO, Take 2 Tabs by mouth every day., Disp: , Rfl:   •  Lutein 20 MG Cap, Take 1 Cap by mouth every day., Disp: 90 Cap, Rfl: 3  •  vitamin D (CHOLECALCIFEROL) 1000 UNIT TABS, Take 2,000 Units by mouth every day., Disp: , Rfl:   •  furosemide (LASIX) 40 MG Tab, Take 2 Tabs by mouth every day. Indications: Edema, High Blood Pressure Disorder (Patient not taking: Reported on 11/18/2019), Disp: 180 Tab, Rfl: 3  ALLERGIES:   Allergies   Allergen Reactions   • Amiodarone Hives     Throat and tongue itching   • Bactrim Shortness of Breath   • Cipro Xr Swelling   • Metoprolol Swelling     Causes throat swelling   • Morphine Unspecified     Hallucinations   • Phytoplex Z-Guard [Petrolatum-Zinc Oxide] Unspecified     \"causes burning\"   • Pseudoephedrine Palpitations   • Qvar [Beclomethasone Dipropionate] Unspecified     Pressure on heart     • Vibramycin Shortness of Breath   • Atorvastatin Calcium-Polysorbate 80 Unspecified     Muscle aches     • Augmentin Unspecified     Unknown reaction   • Diltiazem Rash     rash   • Flecainide Unspecified     dizziness   • Keflex Unspecified     Pt states \"Unsure\".   • Mucinex Unspecified     GI Distress     • Tramadol Unspecified     crying   • Atorvastatin Myalgia   • Tape Rash     Paper tape okay     SURGHX:   Past Surgical History:   Procedure Laterality Date   • IRRIGATION & DEBRIDEMENT ORTHO Right 2/14/2019    Procedure: IRRIGATION & DEBRIDEMENT ORTHO-HIP WOUND ;  Surgeon: Vladimir Lee M.D.;  Location: SURGERY [facility];  Service: Orthopedics   • HIP ARTH ANTERIOR TOTAL Right 1/17/2019    Procedure: HIP ARTHROPLASTY ANTERIOR TOTAL;  Surgeon: " "Juan C Mercedes M.D.;  Location: SURGERY [facility];  Service: Orthopedics   • PACEMAKER INSERTION  06/30/2018    Dual Chamber   • KNEE ARTHROPLASTY TOTAL Right 6/23/2016    Procedure: KNEE ARTHROPLASTY TOTAL;  Surgeon: Heriberto Lozada M.D.;  Location: SURGERY [facility];  Service:    • KNEE ARTHROPLASTY TOTAL Left 5/28/2015    Procedure: KNEE ARTHROPLASTY TOTAL;  Surgeon: Heriberto Lozada M.D.;  Location: SURGERY [facility];  Service:    • CATARACT PHACO WITH IOL Right 5/5/2015    Procedure: IOL OD - STANDARD;  Surgeon: Dmitry Bejarano M.D.;  Location: SURGERY [facility];  Service:    • CATARACT PHACO WITH IOL  4/21/2015    Performed by Dmitry Bejarano M.D. at [facility]   • RECOVERY  11/30/2010    Performed by SURGERY, CATH-RECOVERY at SURGERY SAME DAY [facility]   • COLONOSCOPY  2008    Normal    [facility]   • ABDOMINAL HYSTERECTOMY TOTAL  April 15,1975    still has ovaries   • OPEN REDUCTION      left ankle   • OTHER      Removed pins from left ankle   • OTHER CARDIAC SURGERY  12/2017 and 07/19/2018     Cardiac Ablation   • TONSILLECTOMY AND ADENOIDECTOMY       SOCHX:  reports that she has never smoked. She has never used smokeless tobacco. She reports that she does not drink alcohol or use drugs.  FH: Family history was reviewed, no pertinent findings to report     Objective:     /66   Pulse 82   Temp 36.4 °C (97.6 °F) (Temporal)   Resp 16   Ht 1.626 m (5' 4\")   Wt 88.9 kg (196 lb)   LMP 01/01/1993   SpO2 97%   BMI 33.64 kg/m²      Physical Exam  Constitutional:       Appearance: She is well-developed.   HENT:      Head: Normocephalic and atraumatic.      Right Ear: Tympanic membrane, ear canal and external ear normal.      Left Ear: Tympanic membrane, ear canal and external ear normal.      Nose: Mucosal edema, congestion and rhinorrhea present. Rhinorrhea is clear.      Right Sinus: Maxillary sinus tenderness and frontal sinus tenderness present.     "  Left Sinus: Maxillary sinus tenderness and frontal sinus tenderness present.      Mouth/Throat:      Lips: Pink.      Mouth: Mucous membranes are moist.      Pharynx: Oropharynx is clear.   Eyes:      Conjunctiva/sclera: Conjunctivae normal.      Pupils: Pupils are equal, round, and reactive to light.   Neck:      Musculoskeletal: Normal range of motion.   Cardiovascular:      Rate and Rhythm: Normal rate and regular rhythm.      Heart sounds: Normal heart sounds. No murmur.   Pulmonary:      Effort: Pulmonary effort is normal.      Breath sounds: Normal breath sounds. No wheezing.   Lymphadenopathy:      Cervical: No cervical adenopathy.   Skin:     General: Skin is warm and dry.      Capillary Refill: Capillary refill takes less than 2 seconds.   Neurological:      Mental Status: She is alert and oriented to person, place, and time.   Psychiatric:         Behavior: Behavior normal.         Judgment: Judgment normal.              Assessment/Plan:       1. Viral URI  - fluticasone (FLONASE) 50 MCG/ACT nasal spray; Spray 1 Spray in nose every day.  Dispense: 1 Bottle; Refill: 0  - PO fluids  - Rest  - Tylenol as needed for fever > 100.4 F  *If symptoms fail to improve after 7 to 10 days she should return for reevaluation.            Differential Diagnosis, natural history, and supportive care discussed. Return to the Urgent Care or follow up with your PCP if symptoms fail to resolve, or for any new or worsening symptoms. Emergency room precautions discussed. Patient and/or family appears understanding of information.

## 2019-12-12 ENCOUNTER — PATIENT OUTREACH (OUTPATIENT)
Dept: HEALTH INFORMATION MANAGEMENT | Facility: OTHER | Age: 79
End: 2019-12-12

## 2019-12-12 NOTE — PROGRESS NOTES
1. HealthConnect Verified: yes    2. Verify PCP: yes    3. Review and add  to Care Team: yes    4. WebIZ Checked & Epic Updated: Yes  WebIZ Recommendations: PNEUMOVAX (PPSV23), TD and SHINGRIX (Shingles)  Is patient due for Tdap? NO  Is patient due for Shingles? YES. Patient was not notified of copay/out of pocket cost.    5. Reviewed/Updated the following with patient:       •   Communication Preference Obtained? YES  • [facility] Activation: already active       •   E-Mail Address Obtained? YES       •   Appointment Day and Time Preferences? YES       •   Preferred Pharmacy? YES       •   Preferred Lab? YES    6. Care Gap Scheduling (Attempt to Schedule EACH Overdue Care Gap!)    Scheduled patient for Cardilogy  Patient prefers to discuss Annual Wellness Visit (AWV) and Mammogram with PCP.

## 2019-12-14 ENCOUNTER — NON-PROVIDER VISIT (OUTPATIENT)
Dept: WOUND CARE | Facility: MEDICAL CENTER | Age: 79
End: 2019-12-14
Attending: FAMILY MEDICINE
Payer: MEDICARE

## 2019-12-14 PROCEDURE — 99211 OFF/OP EST MAY X REQ PHY/QHP: CPT

## 2019-12-14 PROCEDURE — 97597 DBRDMT OPN WND 1ST 20 CM/<: CPT

## 2019-12-14 NOTE — CERTIFICATION
"Non Provider Encounter- Pressure Injury    HISTORY OF PRESENT ILLNESS  Wound History:    START OF CARE IN CLINIC: 12/14/2019    REFERRING PROVIDER: Kishore Price MD     WOUND- Pressure Injury.    LOCATION AND STAGE: Left stage III buttock    HISTORY: Patient is an 79 year old female who was discharged 10/24/2019 for a resolved pressure injury to her bilateral buttocks. She is unsure when the wound reopened. She has been ill on and off for the last month and states that she has \"lost track of time\". She does report that she had shingles to this area in 1989 and that she went to  recently and they stated that it looked like shingles also. I do not assess any shingles lesions today. She has been applying lidocaine to the wound daily as well as Gold Bond cream. She does report that she is scheduled for hip surgery in Jan 2020.     Pertinent Medical History: Afib (on Coumadin); spinal stenosis, back pain    Contributing factors:. Activity level: Low to moderate. She states that she has been sitting or lying in her bed for most of the day recently due to illness. Support surfaces: Pillows to offload affected area.  Incontinence: Urine incontinence.  Nutritional state: Appears well nourished. Obesity: Patient is obese. Moisture: Attempts to keep herself dry despite urine incontinence.      TOBACCO USE: Denies    Pertinent Labs and Diagnostics:    Labs:     A1c:   Lab Results   Component Value Date/Time    HBA1C 5.3 02/13/2019 07:57 PM      IMAGING: None recently    VASCULAR STUDIES: N/A    LAST  WOUND CULTURE:  DATE : N/A           Fall Risk Assessment (jaydon all that apply with an X):  Completed 12/14/2019   x65 years or older     Fall within the last 2 years   xUses ambulatory devices  Loss of protective sensation in feet   Use of prostethic/orthotic    Presence of lower extremity/foot/toe amputation   xTaking medication that increases risk (per facility policy)    Interventions Recommended (if any of the above " "are selected):   Use of Assistive Device: Ambulates with walker.    Supervision with ambulation: Caregiver   Assistance with ambulation: Caregiver   Home safety education: Educational material provided     PAST MEDICAL HISTORY:   Past Medical History:   Diagnosis Date   • A-fib ([facility])    • Anesthesia     \"Tachycardia for 5 days after cataract surgery\"   • Anticoagulant long-term use 1/12/2012   • Arthritis     Knees, hips   • Asthma     with pneumonia, nothing for 3 years   • Atrial fibrillation ([facility])    • Backpain     R hip   • Bowel habit changes     diarrhea   • Breath shortness     with exertion O2  2l/m PRN, hasnt used for 3 years   • Bronchitis Nov, 2013   • CAD (coronary artery disease)    • Depression    • Glaucoma 5/3/2011   • Hematoma complicating a procedure 11/3/2012   • Hemorrhagic disorder ([facility])     bruising/coumadin   • High cholesterol    • Hypertension    • Hypothyroid    • Lupus ([facility])    • Macular degeneration    • Menopause 1/12/2012   • Mitral regurgitation 10/30/2012   • Obesity 1/12/2012   • Pacemaker 2018   • Pneumonia feb,2013   • Pre-syncope 6/29/2018   • Pulmonary hypertension ([facility]) 10/30/2012   • PVC's (premature ventricular contractions) 1/12/2012   • Senile nuclear sclerosis    • Spinal stenosis of lumbar region at multiple levels    • Unspecified cataract     repaired bilateral   • Unspecified urinary incontinence      PAST SURGICAL HISTORY:   Past Surgical History:   Procedure Laterality Date   • IRRIGATION & DEBRIDEMENT ORTHO Right 2/14/2019    Procedure: IRRIGATION & DEBRIDEMENT ORTHO-HIP WOUND ;  Surgeon: Vladimir Lee M.D.;  Location: SURGERY [facility];  Service: Orthopedics   • HIP ARTH ANTERIOR TOTAL Right 1/17/2019    Procedure: HIP ARTHROPLASTY ANTERIOR TOTAL;  Surgeon: Juan C Mercedes M.D.;  Location: SURGERY [facility];  Service: Orthopedics   • PACEMAKER INSERTION  06/30/2018    Dual Chamber   • KNEE ARTHROPLASTY TOTAL Right 6/23/2016    Procedure: KNEE " ARTHROPLASTY TOTAL;  Surgeon: Heriberto Lozada M.D.;  Location: SURGERY [facility];  Service:    • KNEE ARTHROPLASTY TOTAL Left 5/28/2015    Procedure: KNEE ARTHROPLASTY TOTAL;  Surgeon: Heriberto Lozada M.D.;  Location: SURGERY [facility];  Service:    • CATARACT PHACO WITH IOL Right 5/5/2015    Procedure: IOL OD - STANDARD;  Surgeon: Dmitry Bejarano M.D.;  Location: SURGERY [facility];  Service:    • CATARACT PHACO WITH IOL  4/21/2015    Performed by Dmitry Bejarano M.D. at [facility] ORS   • RECOVERY  11/30/2010    Performed by SURGERY, [facility]-RECOVERY at SURGERY SAME DAY [facility] ORS   • COLONOSCOPY  2008    Normal    [facility]   • ABDOMINAL HYSTERECTOMY TOTAL  April 15,1975    still has ovaries   • OPEN REDUCTION      left ankle   • OTHER      Removed pins from left ankle   • OTHER CARDIAC SURGERY  12/2017 and 07/19/2018     Cardiac Ablation   • TONSILLECTOMY AND ADENOIDECTOMY        MEDICATIONS:   Current Outpatient Medications   Medication   • fluticasone (FLONASE) 50 MCG/ACT nasal spray   • sertraline (ZOLOFT) 50 MG Tab   • magnesium oxide (MAG-OX) 400 MG Tab   • Triamcinolone Acetonide 0.025 % Lotion   • Mirabegron ER (MYRBETRIQ) 50 MG TABLET SR 24 HR   • furosemide (LASIX) 40 MG Tab   • sennosides (SENOKOT) 8.6 MG Tab   • estradiol (ESTRACE) 0.1 MG/GM vaginal cream   • ipratropium (ATROVENT) 0.03 % Solution   • bacitracin-polymyxin b (POLYSPORIN) 500-01356 UNIT/GM Ointment   • levothyroxine (SYNTHROID) 50 MCG Tab   • lisinopril (PRINIVIL) 5 MG Tab   • raNITidine (ZANTAC) 150 MG Tab   • warfarin (COUMADIN) 2.5 MG Tab   • atenolol (TENORMIN) 50 MG Tab   • nystatin (MYCOSTATIN) powder   • Misc. Devices Misc   • Alpha-D-Galactosidase (BEANO) Tab   • Acetaminophen 500 MG Cap   • estradiol (ESTRACE) 2 MG Tab   • spironolactone (ALDACTONE) 50 MG Tab   • PREBIOTIC PRODUCT PO   • Lutein 20 MG Cap   • vitamin D (CHOLECALCIFEROL) 1000 UNIT TABS     No current facility-administered  "medications for this visit.      ALLERGIES:    Allergies   Allergen Reactions   • Amiodarone Hives     Throat and tongue itching   • Bactrim Shortness of Breath   • Cipro Xr Swelling   • Metoprolol Swelling     Causes throat swelling   • Morphine Unspecified     Hallucinations   • Phytoplex Z-Guard [Petrolatum-Zinc Oxide] Unspecified     \"causes burning\"   • Pseudoephedrine Palpitations   • Qvar [Beclomethasone Dipropionate] Unspecified     Pressure on heart     • Vibramycin Shortness of Breath   • Atorvastatin Calcium-Polysorbate 80 Unspecified     Muscle aches     • Augmentin Unspecified     Unknown reaction   • Diltiazem Rash     rash   • Flecainide Unspecified     dizziness   • Keflex Unspecified     Pt states \"Unsure\".   • Mucinex Unspecified     GI Distress     • Tramadol Unspecified     crying   • Atorvastatin Myalgia   • Tape Rash     Paper tape okay     SOCIAL HISTORY:   Social History     Socioeconomic History   • Marital status:      Spouse name: Not on file   • Number of children: Not on file   • Years of education: Not on file   • Highest education level: Not on file   Occupational History   • Not on file   Social Needs   • Financial resource strain: Not on file   • Food insecurity:     Worry: Not on file     Inability: Not on file   • Transportation needs:     Medical: Not on file     Non-medical: Not on file   Tobacco Use   • Smoking status: Never Smoker   • Smokeless tobacco: Never Used   Substance and Sexual Activity   • Alcohol use: No   • Drug use: No   • Sexual activity: Not Currently     Partners: Male     Birth control/protection: Post-Menopausal   Lifestyle   • Physical activity:     Days per week: Not on file     Minutes per session: Not on file   • Stress: Not on file   Relationships   • Social connections:     Talks on phone: Not on file     Gets together: Not on file     Attends Voodoo service: Not on file     Active member of club or organization: Not on file     Attends " meetings of clubs or organizations: Not on file     Relationship status: Not on file   • Intimate partner violence:     Fear of current or ex partner: Not on file     Emotionally abused: Not on file     Physically abused: Not on file     Forced sexual activity: Not on file   Other Topics Concern   • Not on file   Social History Narrative   • Not on file     WOUND ASSESSMENT  Wound 10/16/19 Full Thickness Wound Buttocks --Left Buttock (Active)   Wound Image   12/14/2019 10:00 AM   Site Assessment Red;Yellow 12/14/2019 10:00 AM   Edie-wound Assessment Intact;Blanchable erythema 12/14/2019 10:00 AM   Margins Attached edges 10/16/2019  3:30 PM   Wound Length (cm) 0.7 cm 10/16/2019  3:30 PM   Wound Width (cm) 0.4 cm 10/16/2019  3:30 PM   Wound Depth (cm) 0.1 cm 10/16/2019  3:30 PM   Wound Surface Area (cm^2) 0.28 cm^2 10/16/2019  3:30 PM   Post Wound Length (cm) 0.9 cm 12/14/2019 10:00 AM    Post Wound Width (cm) 1 cm 12/14/2019 10:00 AM   Post Wound Depth (cm) 0.1 cm 12/14/2019 10:00 AM   Post Wound Surface Area (cm^2) 0.9 cm^2 12/14/2019 10:00 AM   Tunneling 0 cm 12/14/2019 10:00 AM   Undermining 0 cm 12/14/2019 10:00 AM   Drainage Amount None 12/14/2019 10:00 AM   Drainage Description Serosanguineous 10/16/2019  3:30 PM   Non-staged Wound Description Full thickness 12/14/2019 10:00 AM   Treatments Cleansed 12/14/2019 10:00 AM   Cleansing Normal Saline Irrigation 10/16/2019  3:30 PM   Periwound Protectant Skin Protectant wipes to Periwound 12/14/2019 10:00 AM   Dressing Options Honey Colloid;Hypafix Tape 12/14/2019 10:00 AM   Dressing Changed New 12/14/2019 10:00 AM   Dressing Status Clean;Dry;Intact 12/14/2019 10:00 AM   WOUND NURSE ONLY - Odor None 12/14/2019 10:00 AM   WOUND NURSE ONLY - Exposed Structures None 12/14/2019 10:00 AM   WOUND NURSE ONLY - Tissue Type and Percentage 10% yellow; 90% red 12/14/2019 10:00 AM       Procedures:    -Scissors and forceps used to debride wound bed and periwound callus. Entire  surface of wound, 0.9 cm2 debrided.  -Refer to flowsheet for wound care details.       Post-debridement photo            PATIENT EDUCATION  -Etiology of pressure injury  -Importance of offloading and frequent repositioning  -Strategies for offloading in bed and when seated discussed and demonstrated  - Importance of adequate nutrition for wound healing  - Advised to go to ER for any increased redness, swelling, drainage or odor, or if patient develops fever, chills, nausea or vomiting.

## 2019-12-14 NOTE — PATIENT INSTRUCTIONS
Should you experience any significant changes in your wound(s) such as infection (redness, swelling, localized heat, increased pain, fever >101 F, chills) or have any questions regarding your home care instructions, please contact the wound center [phone]. If after hours, contact your primary care physician or go the hospital emergency room.  Keep dressing clean and dry and cover while bathing. Only change dressing if over saturated, soiled or its falling off.

## 2019-12-16 ENCOUNTER — ANTICOAGULATION MONITORING (OUTPATIENT)
Dept: VASCULAR LAB | Facility: MEDICAL CENTER | Age: 79
End: 2019-12-16

## 2019-12-16 DIAGNOSIS — Z79.01 CHRONIC ANTICOAGULATION: ICD-10-CM

## 2019-12-16 LAB — INR PPP: 3.2 (ref 2–3.5)

## 2019-12-16 NOTE — PROGRESS NOTES
OP Telephone Anticoagulation Service Note    Date: 12/16/2019      Anticoagulation Summary  As of 12/16/2019    INR goal:   2.0-3.0   TTR:   73.4 % (4.4 y)   INR used for dosing:   3.20! (12/16/2019)   Warfarin maintenance plan:   3.75 mg (2.5 mg x 1.5) every Mon, Wed, Fri; 2.5 mg (2.5 mg x 1) all other days   Weekly warfarin total:   21.25 mg   Plan last modified:   Massiel Coleman (12/2/2019)   Next INR check:   12/30/2019   Target end date:   Indefinite    Indications    Atrial fibrillation (HCC) (Resolved) [I48.91]  Chronic anticoagulation [Z79.01]             Anticoagulation Episode Summary     INR check location:   Home Draw    Preferred lab:       Send INR reminders to:       Comments:   Winston YEAGER      Anticoagulation Care Providers     Provider Role Specialty Phone number    Lizzy Haywood M.D. Referring Cardiology [phone]    [facility] Responsible  [phone]    Peter HollowayD Responsible          Anticoagulation Patient Findings        Plan: Spoke with patient's  on the phone. Patient is Supra therapeutic today.  Will have pt take 2.5 mg today then continue dosing as outlined. Will follow-up with patient in 2 week(s).              Ivone Barraza, PharmD

## 2019-12-18 ENCOUNTER — NON-PROVIDER VISIT (OUTPATIENT)
Dept: WOUND CARE | Facility: MEDICAL CENTER | Age: 79
End: 2019-12-18
Attending: FAMILY MEDICINE
Payer: MEDICARE

## 2019-12-18 ENCOUNTER — OFFICE VISIT (OUTPATIENT)
Dept: URGENT CARE | Facility: PHYSICIAN GROUP | Age: 79
End: 2019-12-18
Payer: MEDICARE

## 2019-12-18 VITALS
SYSTOLIC BLOOD PRESSURE: 136 MMHG | RESPIRATION RATE: 16 BRPM | TEMPERATURE: 98.5 F | DIASTOLIC BLOOD PRESSURE: 62 MMHG | OXYGEN SATURATION: 98 % | HEART RATE: 100 BPM

## 2019-12-18 DIAGNOSIS — J01.41 ACUTE RECURRENT PANSINUSITIS: ICD-10-CM

## 2019-12-18 PROCEDURE — 97597 DBRDMT OPN WND 1ST 20 CM/<: CPT

## 2019-12-18 PROCEDURE — 99214 OFFICE O/P EST MOD 30 MIN: CPT | Performed by: FAMILY MEDICINE

## 2019-12-18 RX ORDER — AMOXICILLIN 500 MG/1
500 CAPSULE ORAL 3 TIMES DAILY
Qty: 21 CAP | Refills: 0 | Status: SHIPPED | OUTPATIENT
Start: 2019-12-18 | End: 2019-12-25

## 2019-12-18 ASSESSMENT — ENCOUNTER SYMPTOMS
EYE DISCHARGE: 0
MYALGIAS: 0
NAUSEA: 0
VOMITING: 0
EYE REDNESS: 0
WEIGHT LOSS: 0

## 2019-12-18 NOTE — PROCEDURES
CSWD with curette to remove dried crust & non-viable biofilm from left wound buttock wound bed of ~1cm2 to reveal partial thickness wound beneath. Right buttocks resolved.

## 2019-12-18 NOTE — PATIENT INSTRUCTIONS
Should you experience any significant changes in your wound(s) such as infection (redness, swelling, localized heat, increased pain, fever >101 F, chills) or have any questions regarding your home care instructions, please contact the wound center [phone]. If after hours, contact your primary care physician or go the hospital emergency room.  Apply Desitin Zinc cream to area after every incontinent episode.   Relieve pressure from area by repositioning every hour with pillows to offload area.

## 2019-12-18 NOTE — PROGRESS NOTES
Subjective:      Donte Magaña is a 79 y.o. female who presents with Sinus Problem (sinus pressure x10 days)            1.5 weeks sinus pressure and drainage.  PMH sinusitis.  No sinus surgeries.  Symptoms are moderate severity and progressively worse.  Minimal relief with OTC medications.  Associated sore throat and cough are mild and due to drainage.  No shortness of breath or wheezing.  No other aggravating or alleviating factors.      Review of Systems   Constitutional: Negative for malaise/fatigue and weight loss.   Eyes: Negative for discharge and redness.   Gastrointestinal: Negative for nausea and vomiting.   Musculoskeletal: Negative for joint pain and myalgias.   Skin: Negative for itching and rash.   .  Medications, Allergies, and current problem list reviewed today in Epic         Objective:     /62 (BP Location: Right arm, Patient Position: Sitting, BP Cuff Size: Small adult)   Pulse 100   Temp 36.9 °C (98.5 °F) (Temporal)   Resp 16   LMP 01/01/1993   SpO2 98%      Physical Exam  Constitutional:       General: She is not in acute distress.     Appearance: She is well-developed.   HENT:      Head: Normocephalic and atraumatic.      Right Ear: Tympanic membrane normal.      Left Ear: Tympanic membrane normal.      Nose: Congestion present.      Comments: Tender frontal and maxillary sinus bilateral. +PND  Eyes:      Conjunctiva/sclera: Conjunctivae normal.   Cardiovascular:      Rate and Rhythm: Normal rate and regular rhythm.      Heart sounds: Normal heart sounds. No murmur.   Pulmonary:      Effort: Pulmonary effort is normal.      Breath sounds: Normal breath sounds. No wheezing.   Skin:     General: Skin is warm and dry.      Findings: No rash.   Neurological:      Mental Status: She is alert and oriented to person, place, and time.                 Assessment/Plan:     1. Acute recurrent pansinusitis  amoxicillin (AMOXIL) 500 MG Cap     Differential diagnosis, natural history,  supportive care, and indications for immediate follow-up discussed at length.     Nasal saline, decongestant, nasal CS

## 2019-12-28 ENCOUNTER — NON-PROVIDER VISIT (OUTPATIENT)
Dept: WOUND CARE | Facility: MEDICAL CENTER | Age: 79
End: 2019-12-28
Attending: FAMILY MEDICINE
Payer: MEDICARE

## 2019-12-28 PROCEDURE — 97597 DBRDMT OPN WND 1ST 20 CM/<: CPT

## 2019-12-30 ENCOUNTER — ANTICOAGULATION MONITORING (OUTPATIENT)
Dept: VASCULAR LAB | Facility: MEDICAL CENTER | Age: 79
End: 2019-12-30

## 2019-12-30 DIAGNOSIS — Z79.01 CHRONIC ANTICOAGULATION: ICD-10-CM

## 2019-12-30 LAB — INR PPP: 3 (ref 2–3.5)

## 2019-12-30 NOTE — PROGRESS NOTES
Anticoagulation Summary  As of 12/30/2019    INR goal:   2.0-3.0   TTR:   72.8 % (4.4 y)   INR used for dosing:   3.00 (12/30/2019)   Warfarin maintenance plan:   3.75 mg (2.5 mg x 1.5) every Mon, Wed, Fri; 2.5 mg (2.5 mg x 1) all other days   Weekly warfarin total:   21.25 mg   Plan last modified:   Massiel EMILY Filter (12/2/2019)   Next INR check:   1/13/2020   Target end date:   Indefinite    Indications    Atrial fibrillation (HCC) (Resolved) [I48.91]  Chronic anticoagulation [Z79.01]             Anticoagulation Episode Summary     INR check location:   Home Draw    Preferred lab:       Send INR reminders to:       Comments:   Winston YEAGER      Anticoagulation Care Providers     Provider Role Specialty Phone number    Lizzy Haywood M.D. Referring Cardiology [phone]    [facility] Responsible  [phone]    Peter HollowayD Responsible          Anticoagulation Patient Findings        Spoke to patient on the phone.   INR  therapeutic.   Denies signs/symptoms of bleeding and/or thrombosis.   Denies changes to diet or medications.   Follow up appointment in 2 week(s).    Continue weekly warfarin dose as noted      Fran Leyva, PharmD, MS, BCACP, LCC    This note was created using voice recognition software (Dragon). The accuracy of the dictation is limited by the abilities of the software. I have reviewed the note prior to signing, however some errors in grammar and context are still possible. If you have any questions related to this note please do not hesitate to contact our office.

## 2019-12-31 ENCOUNTER — OFFICE VISIT (OUTPATIENT)
Dept: MEDICAL GROUP | Facility: PHYSICIAN GROUP | Age: 79
End: 2019-12-31
Payer: MEDICARE

## 2019-12-31 VITALS
DIASTOLIC BLOOD PRESSURE: 76 MMHG | HEART RATE: 71 BPM | WEIGHT: 196 LBS | TEMPERATURE: 98.2 F | RESPIRATION RATE: 18 BRPM | SYSTOLIC BLOOD PRESSURE: 124 MMHG | OXYGEN SATURATION: 95 % | BODY MASS INDEX: 33.64 KG/M2

## 2019-12-31 DIAGNOSIS — Z95.0 CARDIAC PACEMAKER IN SITU: ICD-10-CM

## 2019-12-31 DIAGNOSIS — R32 URINARY INCONTINENCE, UNSPECIFIED TYPE: ICD-10-CM

## 2019-12-31 DIAGNOSIS — L89.301 PRESSURE INJURY OF BUTTOCK, STAGE 1, UNSPECIFIED LATERALITY: ICD-10-CM

## 2019-12-31 DIAGNOSIS — I48.19 PERSISTENT ATRIAL FIBRILLATION (HCC): ICD-10-CM

## 2019-12-31 PROCEDURE — 99213 OFFICE O/P EST LOW 20 MIN: CPT | Performed by: FAMILY MEDICINE

## 2020-01-01 NOTE — PROGRESS NOTES
"Patient comes in after she stopped Lasix.  She is getting ready to have bladder surgery because of ongoing problems with urinary incontinence.  She denies shortness of breath or chest pains.  She does have some swelling in her ankles but says that is not too bad.  I also had referred her back to wound care for the pressure sore on her buttocks which is now healing and doing well she says according to the wound care people.  We will take a look at it today.  She says it does not hurt.    I reviewed the following    Past Medical History:   Diagnosis Date   • A-fib ([facility])    • Anesthesia     \"Tachycardia for 5 days after cataract surgery\"   • Anticoagulant long-term use 1/12/2012   • Arthritis     Knees, hips   • Asthma     with pneumonia, nothing for 3 years   • Atrial fibrillation ([facility])    • Backpain     R hip   • Bowel habit changes     diarrhea   • Breath shortness     with exertion O2  2l/m PRN, hasnt used for 3 years   • Bronchitis Nov, 2013   • CAD (coronary artery disease)    • Depression    • Glaucoma 5/3/2011   • Hematoma complicating a procedure 11/3/2012   • Hemorrhagic disorder ([facility])     bruising/coumadin   • High cholesterol    • Hypertension    • Hypothyroid    • Lupus ([facility])    • Macular degeneration    • Menopause 1/12/2012   • Mitral regurgitation 10/30/2012   • Obesity 1/12/2012   • Pacemaker 2018   • Pneumonia feb,2013   • Pre-syncope 6/29/2018   • Pulmonary hypertension ([facility]) 10/30/2012   • PVC's (premature ventricular contractions) 1/12/2012   • Senile nuclear sclerosis    • Spinal stenosis of lumbar region at multiple levels    • Unspecified cataract     repaired bilateral   • Unspecified urinary incontinence         Past Surgical History:   Procedure Laterality Date   • IRRIGATION & DEBRIDEMENT ORTHO Right 2/14/2019    Procedure: IRRIGATION & DEBRIDEMENT ORTHO-HIP WOUND ;  Surgeon: Vladimir Lee M.D.;  Location: SURGERY [facility];  Service: Orthopedics   • HIP ARTH ANTERIOR TOTAL Right " "1/17/2019    Procedure: HIP ARTHROPLASTY ANTERIOR TOTAL;  Surgeon: Juan C Mercedes M.D.;  Location: SURGERY [facility];  Service: Orthopedics   • PACEMAKER INSERTION  06/30/2018    Dual Chamber   • KNEE ARTHROPLASTY TOTAL Right 6/23/2016    Procedure: KNEE ARTHROPLASTY TOTAL;  Surgeon: Heriberto Lozada M.D.;  Location: SURGERY [facility];  Service:    • KNEE ARTHROPLASTY TOTAL Left 5/28/2015    Procedure: KNEE ARTHROPLASTY TOTAL;  Surgeon: Heriberto Lozada M.D.;  Location: SURGERY [facility];  Service:    • CATARACT PHACO WITH IOL Right 5/5/2015    Procedure: IOL OD - STANDARD;  Surgeon: Dmitry Bejarano M.D.;  Location: [facility];  Service:    • CATARACT PHACO WITH IOL  4/21/2015    Performed by Dmitry Bejarano M.D. at [facility] ORS   • RECOVERY  11/30/2010    Performed by SURGERY, [facility]-RECOVERY at [facility] SAME DAY [facility] ORS   • COLONOSCOPY  2008    Normal    [facility]   • ABDOMINAL HYSTERECTOMY TOTAL  April 15,1975    still has ovaries   • OPEN REDUCTION      left ankle   • OTHER      Removed pins from left ankle   • OTHER CARDIAC SURGERY  12/2017 and 07/19/2018     Cardiac Ablation   • TONSILLECTOMY AND ADENOIDECTOMY         Allergies   Allergen Reactions   • Amiodarone Hives     Throat and tongue itching   • Bactrim Shortness of Breath   • Cipro Xr Swelling   • Metoprolol Swelling     Causes throat swelling   • Morphine Unspecified     Hallucinations   • Phytoplex Z-Guard [Petrolatum-Zinc Oxide] Unspecified     \"causes burning\"   • Pseudoephedrine Palpitations   • Qvar [Beclomethasone Dipropionate] Unspecified     Pressure on heart     • Vibramycin Shortness of Breath   • Atorvastatin Calcium-Polysorbate 80 Unspecified     Muscle aches     • Augmentin Unspecified     Unknown reaction   • Diltiazem Rash     rash   • Flecainide Unspecified     dizziness   • Keflex Unspecified     Pt states \"Unsure\".   • Mucinex Unspecified     GI Distress     • Tramadol " Unspecified     crying   • Atorvastatin Myalgia   • Tape Rash     Paper tape okay       Current Outpatient Medications   Medication Sig Dispense Refill   • fluticasone (FLONASE) 50 MCG/ACT nasal spray Spray 1 Spray in nose every day. 1 Bottle 0   • sertraline (ZOLOFT) 50 MG Tab Take 1 Tab by mouth every day. 90 Tab 3   • magnesium oxide (MAG-OX) 400 MG Tab Take 1 Tab by mouth every day. 100 Tab 3   • Triamcinolone Acetonide 0.025 % Lotion 1 Squirt by Apply externally route 2 Times a Day. Apply to itching ears. 1 Bottle 1   • Mirabegron ER (MYRBETRIQ) 50 MG TABLET SR 24 HR Take 50 mg by mouth every day. 90 Tab 3   • furosemide (LASIX) 40 MG Tab Take 2 Tabs by mouth every day. Indications: Edema, High Blood Pressure Disorder (Patient not taking: Reported on 11/18/2019) 180 Tab 3   • sennosides (SENOKOT) 8.6 MG Tab Take 8.6 mg by mouth 1 time daily as needed.     • estradiol (ESTRACE) 0.1 MG/GM vaginal cream      • ipratropium (ATROVENT) 0.03 % Solution SPRAY 2 SPRAYS IN NOSE EVERY 12 HOURS. 30 mL 3   • bacitracin-polymyxin b (POLYSPORIN) 500-50853 UNIT/GM Ointment Apply twice a day to infected sores on skin. 15 g 1   • levothyroxine (SYNTHROID) 50 MCG Tab Take 1 Tab by mouth every morning. ON A EMPTY STOMACH 90 Tab 3   • lisinopril (PRINIVIL) 5 MG Tab Take 1 Tab by mouth every day. 100 Tab 1   • raNITidine (ZANTAC) 150 MG Tab TAKE ONE TABLET BY MOUTH TWICE DAILY 180 Tab 3   • warfarin (COUMADIN) 2.5 MG Tab Take 1-1.5 Tabs by mouth every day. As directed by the [facility] 135 Tab 2   • atenolol (TENORMIN) 50 MG Tab Take 1 Tab by mouth 2 times a day. 180 Tab 3   • nystatin (MYCOSTATIN) powder Apply up to 4 times a day to groin rash after washing with mild soap and water 30 g 1   • Misc. Devices Misc Dispense test strips to be used with home INR testing for warfarin anticoagulation dose regulation 100 Strip 2   • Alpha-D-Galactosidase (BEANO) Tab Take 2 tablet by mouth as needed. swallow or chew 2  tablets.     • Acetaminophen 500 MG Cap Take 2 Caps by mouth 3 times a day.     • estradiol (ESTRACE) 2 MG Tab TAKE ONE TABLET BY MOUTH EVERY DAY 90 Tab 3   • spironolactone (ALDACTONE) 50 MG Tab TAKE  ONE TABLET BY MOUTH EVERY MORNING AND EVERY EVENING 180 Tab 3   • PREBIOTIC PRODUCT PO Take 2 Tabs by mouth every day.     • Lutein 20 MG Cap Take 1 Cap by mouth every day. 90 Cap 3   • vitamin D (CHOLECALCIFEROL) 1000 UNIT TABS Take 2,000 Units by mouth every day.       No current facility-administered medications for this visit.         Family History   Problem Relation Age of Onset   • Stroke Mother    • Diabetes Father    • Stroke Sister    • Heart Disease Brother    • Stroke Sister    • GI Disease Daughter         Crohn's Disease   • Heart Disease Daughter         CHF   • Other Daughter         Chronic Pain--Lymphedema   • Cancer Paternal Aunt        Social History     Socioeconomic History   • Marital status:      Spouse name: Not on file   • Number of children: Not on file   • Years of education: Not on file   • Highest education level: Not on file   Occupational History   • Not on file   Social Needs   • Financial resource strain: Not on file   • Food insecurity:     Worry: Not on file     Inability: Not on file   • Transportation needs:     Medical: Not on file     Non-medical: Not on file   Tobacco Use   • Smoking status: Never Smoker   • Smokeless tobacco: Never Used   Substance and Sexual Activity   • Alcohol use: No   • Drug use: No   • Sexual activity: Not Currently     Partners: Male     Birth control/protection: Post-Menopausal   Lifestyle   • Physical activity:     Days per week: Not on file     Minutes per session: Not on file   • Stress: Not on file   Relationships   • Social connections:     Talks on phone: Not on file     Gets together: Not on file     Attends Islam service: Not on file     Active member of club or organization: Not on file     Attends meetings of clubs or  organizations: Not on file     Relationship status: Not on file   • Intimate partner violence:     Fear of current or ex partner: Not on file     Emotionally abused: Not on file     Physically abused: Not on file     Forced sexual activity: Not on file   Other Topics Concern   • Not on file   Social History Narrative   • Not on file      The patient is well-developed well-nourished and in no acute distress    Pupils equally round and reactive to light    Ears normal    Nose normal    Throat clear    Neck supple no cervical adenopathy no thyromegaly    Chest clear to auscultation    Heart irregular rhythm no murmur S3 or S4 appreciated    Back no CVA pain or spasm    Abdomen flat soft good bowel sounds no mass No hepatosplenomegaly no rebound    Skin pressure sore on her buttocks is indeed healing well.  There is no evidence of infection.  It is quite superficial now.    DTRs 2+ in ankles knees and biceps    Babinski downgoing bilaterally    Pulses 2+ in all 4 extremities--1+ edema in her ankles bilaterally    1. Persistent atrial fibrillation (HCC)   rate controlled   2. Cardiac pacemaker in situ   doing well   3. Urinary incontinence, unspecified type   patient will be having surgery soon   4. Pressure injury of buttock, stage 1, unspecified laterality   healing well.     Please note that this dictation was created using voice recognition software. I have worked with consultants from the vendor as well as technical experts from [facility] [employer] to optimize the interface. I have made every reasonable attempt to correct obvious errors, but I expect that there are errors of grammar and possibly content that I did not discover before finalizing the note.

## 2020-01-02 ENCOUNTER — APPOINTMENT (OUTPATIENT)
Dept: WOUND CARE | Facility: MEDICAL CENTER | Age: 80
End: 2020-01-02
Attending: FAMILY MEDICINE
Payer: MEDICARE

## 2020-01-03 ENCOUNTER — TELEPHONE (OUTPATIENT)
Dept: VASCULAR LAB | Facility: MEDICAL CENTER | Age: 80
End: 2020-01-03

## 2020-01-03 NOTE — TELEPHONE ENCOUNTER
[facility]    Pt called asking if she could start Pepto Bismol.  Requested pt to avoid this medication and make sure she keeps good hydration with her diarrhea.     Pt to test INR ASAP.     Juan Blanco, PharmD

## 2020-01-06 ENCOUNTER — ANTICOAGULATION MONITORING (OUTPATIENT)
Dept: VASCULAR LAB | Facility: MEDICAL CENTER | Age: 80
End: 2020-01-06

## 2020-01-06 DIAGNOSIS — Z79.01 CHRONIC ANTICOAGULATION: ICD-10-CM

## 2020-01-06 LAB — INR PPP: 3.9 (ref 2–3.5)

## 2020-01-06 NOTE — PROGRESS NOTES
Anticoagulation Summary  As of 1/6/2020    INR goal:   2.0-3.0   TTR:   72.5 % (4.5 y)   INR used for dosing:   3.90! (1/6/2020)   Warfarin maintenance plan:   3.75 mg (2.5 mg x 1.5) every Mon, Wed, Fri; 2.5 mg (2.5 mg x 1) all other days   Weekly warfarin total:   21.25 mg   Plan last modified:   Massiel DIAZ Filter (12/2/2019)   Next INR check:   1/13/2020   Target end date:   Indefinite    Indications    Atrial fibrillation (HCC) (Resolved) [I48.91]  Chronic anticoagulation [Z79.01]             Anticoagulation Episode Summary     INR check location:   Home Draw    Preferred lab:       Send INR reminders to:       Comments:   Winston YEAGER      Anticoagulation Care Providers     Provider Role Specialty Phone number    Lizzy Haywood M.D. Referring Cardiology [phone]    [facility] Responsible  [phone]    Vladimir Taylor, PharmD Responsible          Anticoagulation Patient Findings  Patient Findings     Positives:   Change in medications (finished amoxicillin course; stopped taking pepto bismol as directed on Fri 1/3)          Spoke with pt.  INR is supra therapeutic.   Pt denies any unusual s/s of bleeding, bruising, clotting or any changes to diet. Denies any etoh, cranberries, supplements, or illness.   Pt verifies warfarin weekly dosing.     Pt already took her warfarin today.  Will have pt hold warfarin tomorrow 1/7, take reduced dose on Wed 1/8, then continue regimen    Repeat INR in 1 week(s).     Patti Ponce, Tim     Addendum: Pt called to report that she will be having a bladder procedure on 1/14/2020. Pt is on AC therapy for atrial fib ([id number] score = 1 for age) without prior hx of TIA/stroke. Therefore, no bridging required.     Instructed pt to HOLD warfarin x 5 days prior to procedure starting on 1/9/2020.   Then post-op, bolus x 1 day, then continue regimen.     Repeat INR on Fri 1/17/2020.    Patti Ponce, PeterD

## 2020-01-09 ENCOUNTER — APPOINTMENT (OUTPATIENT)
Dept: WOUND CARE | Facility: MEDICAL CENTER | Age: 80
End: 2020-01-09
Attending: FAMILY MEDICINE
Payer: MEDICARE

## 2020-01-09 NOTE — H&P
DATE OF SCHEDULED SURGERY:  2020    REASON FOR SURGERY:  Refractory pelvic pain, symptomatic pelvic floor   relaxation refractory to conservative pelvic floor exercise therapy, mixed   urinary incontinence and strong type 2 stress urinary incontinence component   as noted on urodynamic studies, persistent bilateral adnexal masses on serial   pelvic ultrasound studies.    HISTORY OF PRESENT ILLNESS:  This is an 79-year-old postmenopausal woman    3, para 3, status post a previous hysterectomy in , originally a   patient of Dr. John Hardin, who referred the patient over for evaluation of   surgical management of her symptomatic pelvic floor relaxation, mixed urinary   incontinence symptoms and pelvic pain with serial ultrasound studies showing   persistent adnexal masses, now wishing to proceed forward with a diagnostic   and possible operative laparoscopy, laparoscopic bilateral   salpingo-oophorectomy, native tissue repair in the form of an anterior and   posterior vaginal repair, enterocele repair, perineoplasty, sacrospinous vault   suspension, and a transobturator tape midurethral sling procedure.  Risks,   benefits, and alternatives of all individual portions of the surgery were   addressed with the patient in detail over several visits.  She has asked   appropriate questions, signed the appropriate consents and is wishing to   proceed forward with the scheduled surgery as planned on 2020.  She does   state that she understands the multiple limitations of surgery in addressing   her pelvic pain, overactive bladder symptoms as these symptoms may be   unimproved or actually worsen with surgery requiring additional medical or   surgical management, and should the adnexal masses be consistent with tumors   or carcinoma, she will need the possibility of additional medical or surgical   management, and even despite these multiple limitations of surgery, she is   wishing to proceed forward  with the scheduled surgery as planned on   01/14/2020.    PAST MEDICAL HISTORY:  Hypothyroidism, coronary artery disease, need for   placement of a pacemaker, arthritis, hemorrhoids, lumbago, venous   varicosities, chronic hypertension.    PAST SURGICAL HISTORY:  Tonsillectomy and adenoidectomy in 1957, hemorrhoidal   surgery, venous varicosity surgery, hysterectomy in 1975, pacemaker surgery in   2019, left knee replacement in 2015, right knee replacement in 2016, hip   replacement in 2019, cataract surgery in 2015.    OBSTETRICAL HISTORY:  Two previous deliveries.    GYNECOLOGIC HISTORY:  The patient has been amenorrheic since her surgery in   1975.  She reports pelvic pain, pressure, a bulge at the vaginal introitus,   mixed urinary incontinence with need for daily pad therapy, recurrent urinary   tract infections.  She denies any previous sexually transmitted diseases or   pelvic infections.    FAMILY HISTORY:  Noncontributory.    REVIEW OF SYSTEMS:  Otherwise unremarkable.    SOCIAL HISTORY:  She is , retired.  She denies use of any alcohol,   tobacco, or recreational drug use.    MEDICATIONS:  Ipratropium bromide, bacitracin, levothyroxine 50 mcg tablets   daily, lisinopril 5 mg tablets daily, ranitidine for gastroesophageal reflux   disease 150 mg tablets daily, warfarin given her history of coronary artery   disease 2.5 mg tablets daily, atenolol 50 mg tablets daily, estradiol tablets   2 mg tablets p.o. daily, spironolactone 50 mg tablets daily, sertraline 50 mg   tablets daily, vitamin B3 1000 units daily.    ALLERGIES:  ADHESIVE TAPE, AMIODARONE, ATORVASTATIN, AUGMENTIN, BACTRIM,   BECLOMETHASONE, CIPROFLOXACIN, DILTIAZEM, FLECAINIDE, CEPHALOSPORINS, LIPITOR,   METOPROLOL, MORPHINE, MUCINEX, PETROLEUM JELLY, PSEUDOEPHEDRINE, TRAMADOL,   VIBRAMYCIN.    PHYSICAL EXAMINATION:  GENERAL:  She is afebrile, hemodynamically stable.  CURRENT VITAL SIGNS:  Can be seen in electronic medical record.  HEART:   Regular rate and rhythm.  CHEST:  Clear to auscultation bilaterally.  ABDOMEN:  Soft, obese, nontender.  PELVIC:  Exam shows atrophic external female genitalia, vaginal vault.  Grade   II anterior, posterior and apical vaginal relaxation.  Tenderness to palpation   at the vaginal cuff.  No adnexal masses were appreciated, although she did   have bilateral tenderness to palpation in both adnexal regions.  The exam was   difficult secondary to the patient's increased body mass index.  Rectovaginal   exam confirmed the above.  She is guaiac negative.    DIAGNOSTIC IMAGING/LABORATORY DATA:  Transvaginal pelvic ultrasound imaging   study done on 12/13/2019 showed one right adnexal cyst measuring 4.2x2 cm and   the second 2.2x3 cm, left ovary of 4 cm with 2 cysts 2.9 cm and one at 2.1 cm.    No free fluid was appreciated.  Urodynamic studies did demonstrate and   confirm type 2 stress urinary incontinence.  Urinalysis was within normal   limits.    ASSESSMENT AND PLAN:  An 79-year-old postmenopausal woman with persistent   bilateral adnexal masses, pelvic pain, pressure, symptomatic pelvic floor   relaxation, mixed urinary incontinence noted with a strong type 2 stress   urinary incontinence component noted on urodynamic studies, now electing to   proceed forward with the surgery as described above as she has failed   expectant and conservative management.  She does understand the many   limitations of surgery and wishes to proceed forward with the scheduled   surgery as planned on 01/14/2020.       ____________________________________     MD WILTON ISSA / DELIO    DD:  01/08/2020 22:04:47  DT:  01/08/2020 22:33:34    D#:  [id number]  Job#:  [id number]

## 2020-01-10 ENCOUNTER — HOSPITAL ENCOUNTER (OUTPATIENT)
Facility: MEDICAL CENTER | Age: 80
End: 2020-01-10
Attending: SPECIALIST
Payer: MEDICARE

## 2020-01-10 DIAGNOSIS — Z01.812 PRE-OPERATIVE LABORATORY EXAMINATION: ICD-10-CM

## 2020-01-10 DIAGNOSIS — Z01.810 PRE-OPERATIVE CARDIOVASCULAR EXAMINATION: ICD-10-CM

## 2020-01-10 LAB
ANION GAP SERPL CALC-SCNC: 11 MMOL/L (ref 0–11.9)
BUN SERPL-MCNC: 29 MG/DL (ref 8–22)
CALCIUM SERPL-MCNC: 9.8 MG/DL (ref 8.5–10.5)
CANCER AG125 SERPL-ACNC: 8.6 U/ML (ref 0–35)
CHLORIDE SERPL-SCNC: 102 MMOL/L (ref 96–112)
CO2 SERPL-SCNC: 23 MMOL/L (ref 20–33)
CREAT SERPL-MCNC: 1.11 MG/DL (ref 0.5–1.4)
EKG IMPRESSION: NORMAL
ERYTHROCYTE [DISTWIDTH] IN BLOOD BY AUTOMATED COUNT: 51 FL (ref 35.9–50)
GLUCOSE SERPL-MCNC: 179 MG/DL (ref 65–99)
HCT VFR BLD AUTO: 46.2 % (ref 37–47)
HGB BLD-MCNC: 14.7 G/DL (ref 12–16)
MCH RBC QN AUTO: 32.6 PG (ref 27–33)
MCHC RBC AUTO-ENTMCNC: 31.8 G/DL (ref 33.6–35)
MCV RBC AUTO: 102.4 FL (ref 81.4–97.8)
PLATELET # BLD AUTO: 264 K/UL (ref 164–446)
PMV BLD AUTO: 10.5 FL (ref 9–12.9)
POTASSIUM SERPL-SCNC: 4.9 MMOL/L (ref 3.6–5.5)
RBC # BLD AUTO: 4.51 M/UL (ref 4.2–5.4)
SODIUM SERPL-SCNC: 136 MMOL/L (ref 135–145)
WBC # BLD AUTO: 12.7 K/UL (ref 4.8–10.8)

## 2020-01-10 PROCEDURE — 86304 IMMUNOASSAY TUMOR CA 125: CPT

## 2020-01-10 PROCEDURE — 93005 ELECTROCARDIOGRAM TRACING: CPT

## 2020-01-10 PROCEDURE — 85027 COMPLETE CBC AUTOMATED: CPT

## 2020-01-10 PROCEDURE — 93010 ELECTROCARDIOGRAM REPORT: CPT | Performed by: INTERNAL MEDICINE

## 2020-01-10 PROCEDURE — 80048 BASIC METABOLIC PNL TOTAL CA: CPT

## 2020-01-10 PROCEDURE — 36415 COLL VENOUS BLD VENIPUNCTURE: CPT

## 2020-01-13 ENCOUNTER — ANTICOAGULATION MONITORING (OUTPATIENT)
Dept: MEDICAL GROUP | Facility: MEDICAL CENTER | Age: 80
End: 2020-01-13

## 2020-01-13 DIAGNOSIS — Z79.01 CHRONIC ANTICOAGULATION: ICD-10-CM

## 2020-01-13 LAB — INR PPP: 1 (ref 2–3.5)

## 2020-01-13 NOTE — PROGRESS NOTES
Anticoagulation Summary  As of 2020    INR goal:   2.0-3.0   TTR:   72.3 % (4.5 y)   INR used for dosin.00! (2020)   Warfarin maintenance plan:   3.75 mg (2.5 mg x 1.5) every Mon, Wed, Fri; 2.5 mg (2.5 mg x 1) all other days   Weekly warfarin total:   21.25 mg   Plan last modified:   Massiel DIAZ Filter (2019)   Next INR check:   2020   Target end date:   Indefinite    Indications    Atrial fibrillation (HCC) (Resolved) [I48.91]  Chronic anticoagulation [Z79.01]             Anticoagulation Episode Summary     INR check location:   Home Draw    Preferred lab:       Send INR reminders to:       Comments:   Winston YEAGER      Anticoagulation Care Providers     Provider Role Specialty Phone number    Lizzy Haywood M.D. Referring Cardiology [phone]    [facility] Responsible  [phone]    Vladimir Taylor, PharmD Responsible          Anticoagulation Patient Findings  Patient Findings     Positives:   Upcoming invasive procedure, Missed doses, Change in medications (on antibiotics)          Spoke with pt.  INR is subtherapeutic d/t holding doses for bladder procedure on 20.   Pt denies any unusual s/s of bleeding, bruising, clotting or any changes to diet or medications. Denies any etoh, cranberries, supplements, or illness.   Pt verifies warfarin weekly dosing.     Will have pt continue holding today for procedure on , then bolus x 1 day post-op, then continue regimen.    Repeat INR in 4 days.     Patti Ponce, PharmD

## 2020-01-14 ENCOUNTER — ANESTHESIA (OUTPATIENT)
Dept: SURGERY | Facility: MEDICAL CENTER | Age: 80
End: 2020-01-14
Payer: MEDICARE

## 2020-01-14 ENCOUNTER — ANESTHESIA EVENT (OUTPATIENT)
Dept: SURGERY | Facility: MEDICAL CENTER | Age: 80
End: 2020-01-14
Payer: MEDICARE

## 2020-01-14 ENCOUNTER — HOSPITAL ENCOUNTER (OUTPATIENT)
Facility: MEDICAL CENTER | Age: 80
End: 2020-01-14
Attending: SPECIALIST | Admitting: SPECIALIST
Payer: MEDICARE

## 2020-01-14 VITALS
RESPIRATION RATE: 16 BRPM | HEIGHT: 64 IN | WEIGHT: 183.42 LBS | OXYGEN SATURATION: 98 % | HEART RATE: 89 BPM | BODY MASS INDEX: 31.31 KG/M2 | SYSTOLIC BLOOD PRESSURE: 103 MMHG | TEMPERATURE: 97.8 F | DIASTOLIC BLOOD PRESSURE: 53 MMHG

## 2020-01-14 PROCEDURE — 500886 HCHG PACK, LAPAROSCOPY: Performed by: SPECIALIST

## 2020-01-14 PROCEDURE — 160009 HCHG ANES TIME/MIN: Performed by: SPECIALIST

## 2020-01-14 PROCEDURE — 88305 TISSUE EXAM BY PATHOLOGIST: CPT

## 2020-01-14 PROCEDURE — 502240 HCHG MISC OR SUPPLY RC 0272: Performed by: SPECIALIST

## 2020-01-14 PROCEDURE — 160029 HCHG SURGERY MINUTES - 1ST 30 MINS LEVEL 4: Performed by: SPECIALIST

## 2020-01-14 PROCEDURE — 501399 HCHG SPECIMAN BAG, ENDO CATC: Performed by: SPECIALIST

## 2020-01-14 PROCEDURE — 700105 HCHG RX REV CODE 258: Performed by: SPECIALIST

## 2020-01-14 PROCEDURE — 501577 HCHG TROCAR, STEP 11MM: Performed by: SPECIALIST

## 2020-01-14 PROCEDURE — 700101 HCHG RX REV CODE 250

## 2020-01-14 PROCEDURE — 160036 HCHG PACU - EA ADDL 30 MINS PHASE I: Performed by: SPECIALIST

## 2020-01-14 PROCEDURE — 502703 HCHG DEVICE, LIGASURE V SEALER: Performed by: SPECIALIST

## 2020-01-14 PROCEDURE — 501579 HCHG TROCAR, STEP 5MM: Performed by: SPECIALIST

## 2020-01-14 PROCEDURE — 501330 HCHG SET, CYSTO IRRIG TUBING: Performed by: SPECIALIST

## 2020-01-14 PROCEDURE — 160046 HCHG PACU - 1ST 60 MINS PHASE II: Performed by: SPECIALIST

## 2020-01-14 PROCEDURE — 500854 HCHG NEEDLE, INSUFFLATION FOR STEP: Performed by: SPECIALIST

## 2020-01-14 PROCEDURE — 500892 HCHG PACK, PERI-GYN: Performed by: SPECIALIST

## 2020-01-14 PROCEDURE — 500445 HCHG HEMOSTAT, SURGICEL 4X8: Performed by: SPECIALIST

## 2020-01-14 PROCEDURE — 501516 HCHG SUTURE, CAPIO: Performed by: SPECIALIST

## 2020-01-14 PROCEDURE — C1771 REP DEV, URINARY, W/SLING: HCPCS | Performed by: SPECIALIST

## 2020-01-14 PROCEDURE — 160035 HCHG PACU - 1ST 60 MINS PHASE I: Performed by: SPECIALIST

## 2020-01-14 PROCEDURE — A4338 INDWELLING CATHETER LATEX: HCPCS | Performed by: SPECIALIST

## 2020-01-14 PROCEDURE — 160041 HCHG SURGERY MINUTES - EA ADDL 1 MIN LEVEL 4: Performed by: SPECIALIST

## 2020-01-14 PROCEDURE — 160048 HCHG OR STATISTICAL LEVEL 1-5: Performed by: SPECIALIST

## 2020-01-14 PROCEDURE — 160002 HCHG RECOVERY MINUTES (STAT): Performed by: SPECIALIST

## 2020-01-14 PROCEDURE — 160025 RECOVERY II MINUTES (STATS): Performed by: SPECIALIST

## 2020-01-14 PROCEDURE — 700101 HCHG RX REV CODE 250: Performed by: SPECIALIST

## 2020-01-14 PROCEDURE — 500800 HCHG LAPAROSCOPIC J/L HOOK: Performed by: SPECIALIST

## 2020-01-14 PROCEDURE — 700111 HCHG RX REV CODE 636 W/ 250 OVERRIDE (IP)

## 2020-01-14 PROCEDURE — 501838 HCHG SUTURE GENERAL: Performed by: SPECIALIST

## 2020-01-14 DEVICE — SLING TOT OBTRYX I HALO: Type: IMPLANTABLE DEVICE | Status: FUNCTIONAL

## 2020-01-14 RX ORDER — HALOPERIDOL 5 MG/ML
1 INJECTION INTRAMUSCULAR
Status: DISCONTINUED | OUTPATIENT
Start: 2020-01-14 | End: 2020-01-15 | Stop reason: HOSPADM

## 2020-01-14 RX ORDER — DEXAMETHASONE SODIUM PHOSPHATE 4 MG/ML
INJECTION, SOLUTION INTRA-ARTICULAR; INTRALESIONAL; INTRAMUSCULAR; INTRAVENOUS; SOFT TISSUE PRN
Status: DISCONTINUED | OUTPATIENT
Start: 2020-01-14 | End: 2020-01-14 | Stop reason: SURG

## 2020-01-14 RX ORDER — BUPIVACAINE HYDROCHLORIDE AND EPINEPHRINE 2.5; 5 MG/ML; UG/ML
INJECTION, SOLUTION EPIDURAL; INFILTRATION; INTRACAUDAL; PERINEURAL
Status: DISCONTINUED | OUTPATIENT
Start: 2020-01-14 | End: 2020-01-14 | Stop reason: HOSPADM

## 2020-01-14 RX ORDER — BUPIVACAINE HYDROCHLORIDE AND EPINEPHRINE 2.5; 5 MG/ML; UG/ML
INJECTION, SOLUTION EPIDURAL; INFILTRATION; INTRACAUDAL; PERINEURAL
Status: DISCONTINUED
Start: 2020-01-14 | End: 2020-01-15 | Stop reason: HOSPADM

## 2020-01-14 RX ORDER — ONDANSETRON 2 MG/ML
4 INJECTION INTRAMUSCULAR; INTRAVENOUS
Status: DISCONTINUED | OUTPATIENT
Start: 2020-01-14 | End: 2020-01-15 | Stop reason: HOSPADM

## 2020-01-14 RX ORDER — SIMETHICONE 80 MG
80 TABLET,CHEWABLE ORAL EVERY 8 HOURS PRN
Status: DISCONTINUED | OUTPATIENT
Start: 2020-01-14 | End: 2020-01-15 | Stop reason: HOSPADM

## 2020-01-14 RX ORDER — CLINDAMYCIN PHOSPHATE 150 MG/ML
INJECTION, SOLUTION INTRAVENOUS PRN
Status: DISCONTINUED | OUTPATIENT
Start: 2020-01-14 | End: 2020-01-14 | Stop reason: SURG

## 2020-01-14 RX ORDER — SODIUM CHLORIDE, SODIUM LACTATE, POTASSIUM CHLORIDE, CALCIUM CHLORIDE 600; 310; 30; 20 MG/100ML; MG/100ML; MG/100ML; MG/100ML
INJECTION, SOLUTION INTRAVENOUS CONTINUOUS
Status: DISCONTINUED | OUTPATIENT
Start: 2020-01-14 | End: 2020-01-15 | Stop reason: HOSPADM

## 2020-01-14 RX ORDER — DIPHENHYDRAMINE HYDROCHLORIDE 50 MG/ML
12.5 INJECTION INTRAMUSCULAR; INTRAVENOUS
Status: DISCONTINUED | OUTPATIENT
Start: 2020-01-14 | End: 2020-01-15 | Stop reason: HOSPADM

## 2020-01-14 RX ORDER — PROMETHAZINE HYDROCHLORIDE 25 MG/1
12.5 SUPPOSITORY RECTAL EVERY 4 HOURS PRN
Status: DISCONTINUED | OUTPATIENT
Start: 2020-01-14 | End: 2020-01-15 | Stop reason: HOSPADM

## 2020-01-14 RX ORDER — ONDANSETRON 2 MG/ML
INJECTION INTRAMUSCULAR; INTRAVENOUS PRN
Status: DISCONTINUED | OUTPATIENT
Start: 2020-01-14 | End: 2020-01-14 | Stop reason: SURG

## 2020-01-14 RX ADMIN — ONDANSETRON 8 MG: 2 INJECTION INTRAMUSCULAR; INTRAVENOUS at 14:43

## 2020-01-14 RX ADMIN — FENTANYL CITRATE 100 MCG: 50 INJECTION, SOLUTION INTRAMUSCULAR; INTRAVENOUS at 17:04

## 2020-01-14 RX ADMIN — PROPOFOL 100 MG: 10 INJECTION, EMULSION INTRAVENOUS at 14:43

## 2020-01-14 RX ADMIN — FENTANYL CITRATE 100 MCG: 50 INJECTION, SOLUTION INTRAMUSCULAR; INTRAVENOUS at 14:43

## 2020-01-14 RX ADMIN — ROCURONIUM BROMIDE 40 MG: 10 INJECTION, SOLUTION INTRAVENOUS at 14:43

## 2020-01-14 RX ADMIN — CLINDAMYCIN PHOSPHATE 900 MG: 150 INJECTION, SOLUTION INTRAMUSCULAR; INTRAVENOUS at 14:41

## 2020-01-14 RX ADMIN — DEXAMETHASONE SODIUM PHOSPHATE 8 MG: 4 INJECTION, SOLUTION INTRA-ARTICULAR; INTRALESIONAL; INTRAMUSCULAR; INTRAVENOUS; SOFT TISSUE at 14:43

## 2020-01-14 RX ADMIN — LIDOCAINE HYDROCHLORIDE 25 MG: 20 INJECTION, SOLUTION INFILTRATION; PERINEURAL at 14:45

## 2020-01-14 RX ADMIN — SUGAMMADEX 200 MG: 100 INJECTION, SOLUTION INTRAVENOUS at 17:22

## 2020-01-14 RX ADMIN — SODIUM CHLORIDE, POTASSIUM CHLORIDE, SODIUM LACTATE AND CALCIUM CHLORIDE: 600; 310; 30; 20 INJECTION, SOLUTION INTRAVENOUS at 14:41

## 2020-01-14 RX ADMIN — SODIUM CHLORIDE, POTASSIUM CHLORIDE, SODIUM LACTATE AND CALCIUM CHLORIDE 1000 ML: 600; 310; 30; 20 INJECTION, SOLUTION INTRAVENOUS at 13:14

## 2020-01-14 NOTE — ANESTHESIA PREPROCEDURE EVALUATION
Relevant Problems   CARDIAC   (+) Cardiac pacemaker in situ   (+) Coronary artery disease involving native coronary artery of native heart   (+) Persistent atrial fibrillation (HCC)      ENDO   (+) Hypothyroidism      Other   (+) Chronic anticoagulation   (+) Lupus (systemic lupus erythematosus) (HCC)   (+) Peripheral edema   (+) Spinal stenosis, multilevel       Physical Exam    Airway   Mallampati: II  TM distance: >3 FB  Neck ROM: full       Cardiovascular - normal exam  Rhythm: regular  Rate: normal  (-) murmur     Dental - normal exam         Pulmonary - normal exam  Breath sounds clear to auscultation     Abdominal    Neurological - normal exam                 Anesthesia Plan    ASA 4       Plan - general       Airway plan will be ETT        Induction: intravenous    Postoperative Plan: Postoperative administration of opioids is intended.    Pertinent diagnostic labs and testing reviewed    Informed Consent:    Anesthetic plan and risks discussed with patient.    Use of blood products discussed with: patient whom consented to blood products.

## 2020-01-14 NOTE — ANESTHESIA PROCEDURE NOTES
Airway  Date/Time: 1/14/2020 2:44 PM  Performed by: Choco Doe M.D.  Authorized by: Choco Doe M.D.     Location:  OR  Urgency:  Elective  Indications for Airway Management:  Anesthesia  Spontaneous Ventilation: absent    Sedation Level:  Deep  Preoxygenated: Yes    Patient Position:  Sniffing  Final Airway Type:  Endotracheal airway  Final Endotracheal Airway:  ETT  Cuffed: Yes    Technique Used for Successful ETT Placement:  Video laryngoscopy  Insertion Site:  Oral  Blade Type:  Fernanda  Laryngoscope Blade/Videolaryngoscope Blade Size:  3  ETT Size (mm):  6.5  Measured from:  Teeth  ETT to Teeth (cm):  22  Placement Verified by: auscultation and capnometry    Cormack-Lehane Classification:  Grade I - full view of glottis  Number of Attempts at Approach:  1

## 2020-01-15 LAB — PATHOLOGY CONSULT NOTE: NORMAL

## 2020-01-15 NOTE — ANESTHESIA TIME REPORT
Anesthesia Start and Stop Event Times     Date Time Event    1/14/2020 1428 Ready for Procedure     1441 Anesthesia Start     1727 Anesthesia Stop        Responsible Staff  01/14/20    Name Role Begin End    Choco Doe M.D. Anesth 1458 1727        Preop Diagnosis (Free Text):  Pre-op Diagnosis     STRESS URINARY INCONTINENCE, PELVIC PAIN, CYSTOCELE, RECTOCELE        Preop Diagnosis (Codes):    Post op Diagnosis  Stress incontinence      Premium Reason  A. 3PM - 7AM    Comments:

## 2020-01-15 NOTE — ANESTHESIA QCDR
2019 [facility] Clinical Data Registry (for Quality Improvement)     Postoperative nausea/vomiting risk protocol (Adult = 18 yrs and Pediatric 3-17 yrs)- (430 and 463)  General inhalation anesthetic (NOT TIVA) with PONV risk factors: Yes  Provision of anti-emetic therapy with at least 2 different classes of agents: Yes   Patient DID NOT receive anti-emetic therapy and reason is documented in Medical Record:  N/A    Multimodal Pain Management- (477)  Non-emergent surgery AND patient age >= 18:   Use of Multimodal Pain Management, two or more drugs and/or interventions, NOT including systemic opioids:   Exception: Documented allergy to multiple classes of analgesics:     Smoking Abstinence (404)  Patient is current smoker (cigarette, pipe, e-cig, marijuanna):   Elective Surgery:   Abstinence instructions provided prior to day of surgery:   Patient abstained from smoking on day of surgery:     Pre-Op Beta-Blocker in Isolated CABG (44)  Isolated CABG AND patient age >= 18:   Beta-blocker admin within 24 hours of surgical incision:   Exception:of medical reason(s) for not administering beta blocker within 24 hours prior to surgical incision (e.g., not  indicated,other medical reason):     PACU assessment of acute postoperative pain prior to Anesthesia Care End- Applies to Patients Age = 18- (ABG7)  Initial PACU pain score is which of the following: < 7/10  Patient unable to report pain score: N/A    Post-anesthetic transfer of care checklist/protocol to PACU/ICU- (426 and 427)  Upon conclusion of case, patient transferred to which of the following locations: PACU/Non-ICU  Use of transfer checklist/protocol: Yes  Exclusion: Service Performed in Patient Hospital Room (and thus did not require transfer): N/A  Unplanned admission to ICU related to anesthesia service up through end of PACU care- (MD51)  Unplanned admission to ICU (not initially anticipated at anesthesia start time): No

## 2020-01-15 NOTE — OR SURGEON
Immediate Post OP Note    PreOp Diagnosis: Refractory pelvic pain, symptomatic pelvic floor   relaxation refractory to conservative pelvic floor exercise therapy, mixed   urinary incontinence and strong type 2 stress urinary incontinence component   as noted on urodynamic studies, persistent bilateral adnexal masses on serial   pelvic ultrasound studies.    PostOp Diagnosis: Same; final pathology pending    Procedure(s):  COLPORRHAPHY, COMBINED ANTEROPOSTERIOR - PERINEOPLASTY - Wound Class: Clean Contaminated  REPAIR, ENTEROCELE - Wound Class: Clean Contaminated  BLADDER SLING, FEMALE - TOT - Wound Class: Clean Contaminated  COLPOPEXY - SACROSPINOUS VAULT SUSPENSION - Wound Class: Clean Contaminated  PELVISCOPY - Wound Class: Clean  SALPINGO-OOPHORECTOMY - Wound Class: Clean    Surgeon(s):  VIKTORIA Rodas M.D. Michael S. Thomas, M.D.    Anesthesiologist/Type of Anesthesia:  Anesthesiologist: Choco Doe M.D./General    Surgical Staff:  Circulator: Lucila Campo R.N.  Relief Circulator: Alem Thompson R.N.  Relief Scrub: Ayala Smith; Jessica Victor  Scrub Person: Marcos Leon    Specimens removed if any:  ID Type Source Tests Collected by Time Destination   A : Bilateral Fallopian Tubes, bilateral Ovaries Other Other PATHOLOGY SPECIMEN Waqas Robin M.D. 1/14/2020  3:49 PM        Estimated Blood Loss: 100ccs    Findings: Bilateral cystic lesions firmly adherent to both pelvic sidewalls requiring lysis of adhesions and left ureterolysis of the ureter from the pelvic brim to the bladder to ensure no involvement of the resection of the adnexa. On the left the rectosigmoid colon was firmly adherent medially requiring significant adhesiolysis by colorectal surgeon Dr. La. Good support of the anterior and the posterior and the apical vaginal tissue without any undue tension at the mid urethra after sling placement. Cystoscopy revealed bilateral ureteral efflux, normal bladder and  no undue tension at the mid urethra after sling placement.    Complications: None        1/14/2020 5:21 PM Waaqs Robin M.D.

## 2020-01-15 NOTE — DISCHARGE INSTRUCTIONS
ACTIVITY: Rest and take it easy for the first 24 hours.  A responsible adult is recommended to remain with you during that time.  It is normal to feel sleepy.  We encourage you to not do anything that requires balance, judgment or coordination.    MILD FLU-LIKE SYMPTOMS ARE NORMAL. YOU MAY EXPERIENCE GENERALIZED MUSCLE ACHES, THROAT IRRITATION, HEADACHE AND/OR SOME NAUSEA.    FOR 24 HOURS DO NOT:  Drive, operate machinery or run household appliances.  Drink beer or alcoholic beverages.   Make important decisions or sign legal documents.    SPECIAL INSTRUCTIONS: Call Dr Robin's office first thing Wednesday morning to set up a time to have ann catheter removed.    No heavy lifting.  Pelvic rest.    Okay to restart your coumadin/warfarin today.    DIET: To avoid nausea, slowly advance diet as tolerated, avoiding spicy or greasy foods for the first day.  Add more substantial food to your diet according to your physician's instructions.  Babies can be fed formula or breast milk as soon as they are hungry.  INCREASE FLUIDS AND FIBER TO AVOID CONSTIPATION.    SURGICAL DRESSING/BATHING: Okay to shower once ann catheter has been removed.  No hot tubs, no tub baths, and no swimming until cleared by Dr Robin.    FOLLOW-UP APPOINTMENT:  A follow-up appointment should be arranged with your doctor; call to schedule.    You should CALL YOUR PHYSICIAN if you develop:  Fever greater than 101 degrees F.  Pain not relieved by medication, or persistent nausea or vomiting.  Excessive bleeding (blood soaking through dressing) or unexpected drainage from the wound.  Extreme redness or swelling around the incision site, drainage of pus or foul smelling drainage.  Inability to urinate or empty your bladder within 8 hours.  Problems with breathing or chest pain.    You should call 911 if you develop problems with breathing or chest pain.  If you are unable to contact your doctor or surgical center, you should go to the nearest  emergency room or urgent care center.      Physician's telephone #: [phone] Dr Robin    If any questions arise, call your doctor.  If your doctor is not available, please feel free to call the Surgical Center at [phone].  The Center is open Monday through Friday from 7AM to 7PM.  You can also call the HEALTH HOTLINE open 24 hours/day, 7 days/week and speak to a nurse at [phone], or toll free at [phone].    A registered nurse may call you a few days after your surgery to see how you are doing after your procedure.    MEDICATIONS: Resume taking daily medication.  Take prescribed pain medication with food.  If no medication is prescribed, you may take non-aspirin pain medication if needed.  PAIN MEDICATION CAN BE VERY CONSTIPATING.  Take a stool softener or laxative such as senokot, pericolace, or milk of magnesia if needed.    Prescription given at pre-op visit.  Last pain medication given at _____________.    If your physician has prescribed pain medication that includes Acetaminophen (Tylenol), do not take additional Acetaminophen (Tylenol) while taking the prescribed medication.    Depression / Suicide Risk    As you are discharged from this [facility] facility, it is important to learn how to keep safe from harming yourself.    Recognize the warning signs:  · Abrupt changes in personality, positive or negative- including increase in energy   · Giving away possessions  · Change in eating patterns- significant weight changes-  positive or negative  · Change in sleeping patterns- unable to sleep or sleeping all the time   · Unwillingness or inability to communicate  · Depression  · Unusual sadness, discouragement and loneliness  · Talk of wanting to die  · Neglect of personal appearance   · Rebelliousness- reckless behavior  · Withdrawal from people/activities they love  · Confusion- inability to concentrate     If you or a loved one observes any of these behaviors or has concerns about  self-harm, here's what you can do:  · Talk about it- your feelings and reasons for harming yourself  · Remove any means that you might use to hurt yourself (examples: pills, rope, extension cords, firearm)  · Get professional help from the community (Mental Health, Substance Abuse, psychological counseling)  · Do not be alone:Call your Safe Contact- someone whom you trust who will be there for you.  · Call your local CRISIS HOTLINE 764-9319 or 556-821-5284  · Call your local Children's Mobile Crisis Response Team [place] (670) 164-6176 or www.WeddingLovely  · Call the toll free National Suicide Prevention Hotlines   · National Suicide Prevention Lifeline 687-687-QUAE (6782)  · National Hope Line Network 800-SUICIDE (842-6066)

## 2020-01-15 NOTE — OR NURSING
1725- Pt arrives from OR and report received.  Pruett draining yellow urine.    1735- Warm blankets and heater applied.    1750- Per Dr Robin, pt okay to restart her coumadin today.    1755- Incisions CDI, closed with glue.  Pt able to tolerate sips of water.  Scant vaginal bleeding noted. Denies pain.    1800- Spoke with pt's , update given.  Pt's  sts he would like to have her admitted.  Sts he feels very uncomfortable taking her home.    1845- Message given to Dr Robin that pt's  would like pt to be admitted.  Sts he will come to bedside.    1900- Verbal order received from Dr Doe for hycet elixir.    1930- Pt tolerating room air.  Spoke with pt's , update given, informed there no word from Dr Robin about admit.    1950- Message left for Dr Robin about pt's  and my concern for admission.    2000- Pt sts she is still very sleepy and doesn't feel like she can go home.  Scant vaginal bleeding.    2030- Pt and  made aware that I am unable to get a hold of Dr Robin.  Pt's  sts he will be here in 45 minutes.    2045- Pt up to recliner chair with help.  Small amount of vaginal bleeding.    2100- Spoke with Dr Robin via phone, sts pt does not meet admission criteria at this time.  Pt updated. Pt meets phase 2 criteria.    2115-  at bedside, updated on admit status.  Pt and  provided with d/c paperwork and instructions, no scripts.  Pruett teaching provided.  All questions answered.    2135- Pt changing with help.    2155- IV removed, pt taken out via w/c.

## 2020-01-15 NOTE — OP REPORT
DATE OF SERVICE:  01/14/2020    PREOPERATIVE DIAGNOSES:  Refractory pelvic pain, symptomatic pelvic floor   relaxation refractory to conservative management, mixed urinary incontinence   with strong type 2 stress urinary incontinent component as noted on urodynamic   studies, persistent bilateral complex multicystic adnexal masses on serial   pelvic ultrasound studies with normal CA-125.    POSTOPERATIVE DIAGNOSES:  Refractory pelvic pain, symptomatic pelvic floor   relaxation refractory to conservative management, mixed urinary incontinence   with strong type 2 stress urinary incontinent component as noted on urodynamic   studies, persistent bilateral complex multicystic adnexal masses on serial   pelvic ultrasound studies with normal CA-125.    FINAL PATHOLOGY:  Pending.    PROCEDURES:  Diagnostic laparoscopy, laparoscopic lysis of adhesions,   laparoscopic left ureterolysis, laparoscopic bilateral salpingo-oophorectomy,   anterior and posterior vaginal repair, enterocele repair, perineoplasty,   sacrospinous vault suspension, transobturator tape midurethral sling   procedure, cystoscopy.    SURGEON:  Waqas Robin MD    ASSISTANT:  Colt Coon MD    INTRAOPERATIVE CONSULTANT:  Colorectal surgeon, Russell La MD.    ESTIMATED BLOOD LOSS FOR THE PROCEDURE:  100 mL.    FINDINGS:  1.  Bilateral cystic lesions firmly adherent to both pelvic sidewalls   requiring adhesiolysis with a left ureterolysis of the left ureter from the   pelvic brim to the bladder given the retroperitoneal fibrosis most likely from   her previous surgery to ensure no involvement of the ureter in resection of   the left adnexa.  Also, in the left adnexa, the rectosigmoid colon was firmly   adherent medially requiring a significant adhesiolysis by colorectal surgeon,   Dr. Russell La.  Please see his dictation for details of his portion of   the procedure.  2.  Good support of the anterior and posterior vaginal walls in addition to    apical vaginal tissue without any undue tension at any suture sites.    Cystoscopy revealed normal urinary bladder, bilateral ureteral efflux and no   new tension noted at the level of the mid urethra after sling placement on   cystoscopic evaluation.    DESCRIPTION OF PROCEDURE:  The patient was taken to the operating room where   general anesthesia was performed without difficulty.  The patient was then   prepped and draped in usual sterile fashion.  Lower extremities placed in   Giovani stirrups.  Attention was first turned to the perineum where a weighted   speculum was placed with the use of a sponge stick, which was placed in the   vaginal vault as well as a placement of a Pruett catheter.  Attention was then   turned to the umbilicus where a 1 cm incision was made.  A Veress needle was   placed, 3.5 L of carboperitoneum were then obtained.  A 10 mm trocar port was   then placed.  Two separate ports were placed proximally through the way from   the anterior supra iliac spine lateral to the rectus muscle under direct   laparoscopic visualization.  A separate 5 mm port was placed just lateral to   the umbilical port site given the extensive adhesiolysis necessary for   additional retraction.  Attention was first turned to the right adnexa in   which extensive adhesiolysis of small and large bowel away from the right   adnexa was performed sharply with good hemostasis appreciated once the   infundibulopelvic ligament was isolated.  The adnexal mass was then dissected   away from the right pelvic sidewall, and once freed, the infundibulopelvic   ligament was then isolated, cauterized and transected.  Attention was then   turned to the opposite side where the left infundibulopelvic ligament was   identified; however, the adnexa was firmly adherent to the pelvic sidewall as   well as medially to the rectosigmoid colon.  Attention was first turned to   identification of the peritoneum at the level of the pelvic brim,  which was   opened and dissected.  Once in the pelvic sidewall, the ureter was identified   extending along the left pelvic sidewall down to the level near the bladder.    The mass was then dissected away from the ureter medially, and once freed, the   infundibulopelvic ligament was then isolated, cauterized and transected.    Attention was then turned to the rectosigmoid colon, which required extensive   lysis of adhesions.  At this point, Dr. Russell La from colorectal surgery   was called to assist with the dissection of the rectosigmoid colon away from   the left adnexa.  Once the rectosigmoid colon had been identified to be free   from the left adnexa, an adhesiolysis was completed and the adnexa was firmly   adherent from the left pelvic sidewall.  His portion of the procedure was then   completed once the adhesiolysis of the rectosigmoid colon away from the mass   and pelvic wall had been performed as well as down into the posterior   cul-de-sac.  The remaining portion of the adnexa firmly adherent to the left   pelvic sidewall was then resected away careful to not involve the ureter in   this area of resection.  Once the left adnexa was freed, a 10 Endobag was then   placed where both adnexa were placed in the Endobag, handed off the field as   specimen.  The left adnexa was then identified with an additional Vicryl   suture to ensure adequate processing and to differentiate the 2 adnexal   structures.  The pelvis was then copiously irrigated.  Given the extensive   lysis of adhesions, a 4x8 cm Surgicel was placed in the posterior cul-de-sac   and over the rectosigmoid colon, especially in the area of lysis of adhesions   to act as a barrier between it and the remaining structures in attempt to   decrease adhesive disease.  The 3.5 L of carboperitoneum were then removed   followed by removal of the ports under direct laparoscopic visualization.  The   fascia at the level of the umbilicus was  reapproximated with a UR6 Vicryl   suture.  Once the UR6 Vicryl suture was used to reapproximate the fascia, the   incisions were then reapproximated with single interrupted sutures using 4-0   Vicryl, injected with 20 mL of 0.25% Marcaine with epinephrine.  Attention was   then turned to the perineum where the anterior vaginal mucosa was injected   with 10 mL of 0.25% Marcaine with epinephrine.  The vaginal mucosa was then   dissected away from the underlying pubocervical fascia jeffry and the levator   muscles were then reached.  Horizontal mattress sutures were then used to   reapproximate the pubocervical fascia in midline in a double layer closure,   thereby reducing the midline cystocele.  A 10 mL of 0.25% Marcaine with   epinephrine were then injected with 5 mL on the right, 5 mL on the left   lateral to the clitoris, labial crural folds followed by stab incision made   with use of 11 blade scalpel followed by placement of Obtryx halo needles   through the labial crural incision sites through the obturator membrane out   through the vaginal mucosal incision at the level of the mid urethra.  The   sling was then applied to the Obtryx halo needles.  Needles were then taken   back out through the original tract.  Tensioning of position was then   performed.  The Pruett catheter was then removed.  There did appear on   cystoscopy to be a normal bladder.  Bilateral ureteral efflux was confirmed,   especially noting the left ureteral orifice, which did demonstrate good   ureteral efflux.  The sling was then released.  Tensioning of position was   then finalized.  All excess vaginal mucosa and sling material were then   excised.  The vaginal mucosa was then reapproximated with 2-0 Vicryl in a   running locking fashion in 1 segment after having replaced the Pruett catheter.    The posterior vaginal mucosa was injected with 10 mL of 0.25% Marcaine with   epinephrine.  The vaginal mucosa was then dissected away from the  underlying   rectovaginal fascia jeffry and the levator muscles were then reached.    Horizontal mattress sutures were then used to reapproximate the rectovaginal   fascia in midline in a double layer closure, thereby reducing the midline   rectocele.  Dissection of the ischial spine was then performed on the right 3   cm medial to the ischial spine.  The straight cap needle device was then used   to place the polypropylene suture through the sacrospinous ligament out   through the apical portion of the vaginal cuff and underlying vaginal mucosa.    The rectovaginal septum was then reapproximated to the posterior aspect of   vaginal cuff in a double pursestring suture, thereby reducing an enterocele   located at the superior aspect of dissection.  All excess vaginal mucosa was   resected.  The vaginal mucosa was then reapproximated with 2-0 Vicryl in   running locking fashion in 1 segment, performed perineoplasty on the perineum.    Patient tolerated the procedure well, was awoken from general anesthesia,   was taken to recovery room in stable condition.       ____________________________________     MD WILTON ISSA / DELIO    DD:  01/14/2020 17:43:51  DT:  01/14/2020 19:50:44    D#:  [id number]  Job#:  [id number]

## 2020-01-15 NOTE — ANESTHESIA POSTPROCEDURE EVALUATION
Patient: Donte Magaña    Procedure Summary     Date:  01/14/20 Room / Location:  [facility] ROOM 23 / SURGERY SAME DAY [facility]    Anesthesia Start:  1441 Anesthesia Stop:  1727    Procedures:       COLPORRHAPHY, COMBINED ANTEROPOSTERIOR - PERINEOPLASTY (Vagina )      REPAIR, ENTEROCELE (Vagina )      BLADDER SLING, FEMALE - TOT (Perineum)      COLPOPEXY - SACROSPINOUS VAULT SUSPENSION (Vagina )      PELVISCOPY (Abdomen)      SALPINGO-OOPHORECTOMY (Bilateral Abdomen) Diagnosis:  (STRESS URINARY INCONTINENCE, PELVIC PAIN, CYSTOCELE, RECTOCELE)    Surgeon:  Waqas Robin M.D. Responsible Provider:  Choco Doe M.D.    Anesthesia Type:  general ASA Status:  4          Final Anesthesia Type: general  Last vitals  BP   Blood Pressure : 125/50    Temp   36.6 °C (97.8 °F)    Pulse   Pulse: 80   Resp   20    SpO2   95 %      Anesthesia Post Evaluation    Patient location during evaluation: PACU  Patient participation: complete - patient participated  Level of consciousness: awake and alert    Airway patency: patent  Anesthetic complications: no  Cardiovascular status: hemodynamically stable  Respiratory status: acceptable  Hydration status: euvolemic    PONV: none           Nurse Pain Score: 0 (NPRS)        
Communicable/Infectious

## 2020-01-17 ENCOUNTER — ANTICOAGULATION MONITORING (OUTPATIENT)
Dept: VASCULAR LAB | Facility: MEDICAL CENTER | Age: 80
End: 2020-01-17

## 2020-01-17 DIAGNOSIS — Z79.01 CHRONIC ANTICOAGULATION: ICD-10-CM

## 2020-01-17 LAB — INR PPP: 1.3 (ref 2–3.5)

## 2020-01-17 NOTE — PROGRESS NOTES
Anticoagulation Summary  As of 2020    INR goal:   2.0-3.0   TTR:   72.1 % (4.5 y)   INR used for dosin.30! (2020)   Warfarin maintenance plan:   3.75 mg (2.5 mg x 1.5) every Mon, Wed, Fri; 2.5 mg (2.5 mg x 1) all other days   Weekly warfarin total:   21.25 mg   Plan last modified:   Massiel Coleman (2019)   Next INR check:   2020   Target end date:   Indefinite    Indications    Atrial fibrillation (HCC) (Resolved) [I48.91]  Chronic anticoagulation [Z79.01]             Anticoagulation Episode Summary     INR check location:   Home Draw    Preferred lab:       Send INR reminders to:       Comments:   Winston YEAGER      Anticoagulation Care Providers     Provider Role Specialty Phone number    Lizzy Haywood M.D. Referring Cardiology [phone]    [facility] Anticoagulation Services Responsible  [phone]    Vladimir Taylor, PharmD Responsible          Anticoagulation Patient Findings    HPI:  Donte Magaña, on anticoagulation therapy with warfarin for Afib.   Changes to current medical/health status since last appt: none  Denies signs/symptoms of bleeding and/or thrombosis since the last appt.    Denies any interval changes to diet  Denies any interval changes to medications since last appt.   Denies any complications or cost restrictions with current therapy.     A/P   INR  SUB-therapeutic.   Bolus today then Pt is to continue with current warfarin dosing regimen.     Next INR in 1 week(s).    Juan Blanco, PeterD

## 2020-01-18 DIAGNOSIS — R60.0 BILATERAL LOWER EXTREMITY EDEMA: ICD-10-CM

## 2020-01-20 RX ORDER — SPIRONOLACTONE 50 MG/1
TABLET, FILM COATED ORAL
Qty: 180 TAB | Refills: 2 | Status: SHIPPED | OUTPATIENT
Start: 2020-01-20 | End: 2020-10-12 | Stop reason: SDUPTHER

## 2020-01-20 NOTE — TELEPHONE ENCOUNTER
Was the patient seen in the last year in this department? Yes LOV 12/31/19    Does patient have an active prescription for medications requested? No     Received Request Via: Pharmacy

## 2020-01-24 ENCOUNTER — ANTICOAGULATION MONITORING (OUTPATIENT)
Dept: VASCULAR LAB | Facility: MEDICAL CENTER | Age: 80
End: 2020-01-24

## 2020-01-24 DIAGNOSIS — Z79.01 CHRONIC ANTICOAGULATION: ICD-10-CM

## 2020-01-24 LAB — INR PPP: 2.7 (ref 2–3.5)

## 2020-01-24 NOTE — PROGRESS NOTES
Anticoagulation Summary  As of 2020    INR goal:   2.0-3.0   TTR:   72.0 % (4.5 y)   INR used for dosin.70 (2020)   Warfarin maintenance plan:   3.75 mg (2.5 mg x 1.5) every Mon, Wed, Fri; 2.5 mg (2.5 mg x 1) all other days   Weekly warfarin total:   21.25 mg   Plan last modified:   Massiel Coleman (2019)   Next INR check:   2020   Target end date:   Indefinite    Indications    Atrial fibrillation (HCC) (Resolved) [I48.91]  Chronic anticoagulation [Z79.01]             Anticoagulation Episode Summary     INR check location:   Home Draw    Preferred lab:       Send INR reminders to:       Comments:   Winston YEAGER      Anticoagulation Care Providers     Provider Role Specialty Phone number    Lizzy Haywood M.D. Referring Cardiology [phone]    [facility] Anticoagulation Services Responsible  [phone]    Peter HollowayD Responsible          Anticoagulation Patient Findings     Spoke with patients spouse today regarding therapeutic INR of 2.7.  Patient denies any signs/symptoms of bruising or bleeding or any changes in diet and medications.  Instructed patient to call clinic with any questions or concerns.  Pt is to continue with current warfarin dosing regimen.  Follow up in 1 weeks, to reduce risk of adverse events related to this high risk medication,  Warfarin.    Vladimir Taylor, PharmD, BCACP

## 2020-01-30 ENCOUNTER — TELEPHONE (OUTPATIENT)
Dept: VASCULAR LAB | Facility: MEDICAL CENTER | Age: 80
End: 2020-01-30

## 2020-01-31 ENCOUNTER — ANTICOAGULATION MONITORING (OUTPATIENT)
Dept: VASCULAR LAB | Facility: MEDICAL CENTER | Age: 80
End: 2020-01-31

## 2020-01-31 DIAGNOSIS — Z79.01 CHRONIC ANTICOAGULATION: ICD-10-CM

## 2020-01-31 LAB — INR PPP: 2.7 (ref 2–3.5)

## 2020-01-31 NOTE — PROGRESS NOTES
Anticoagulation Summary  As of 2020    INR goal:   2.0-3.0   TTR:   72.1 % (4.5 y)   INR used for dosin.70 (2020)   Warfarin maintenance plan:   3.75 mg (2.5 mg x 1.5) every Mon, Wed, Fri; 2.5 mg (2.5 mg x 1) all other days   Weekly warfarin total:   21.25 mg   Plan last modified:   Massiel Coleman (2020)   Next INR check:   2020   Target end date:   Indefinite    Indications    Atrial fibrillation (HCC) (Resolved) [I48.91]  Chronic anticoagulation [Z79.01]             Anticoagulation Episode Summary     INR check location:   Home Draw    Preferred lab:       Send INR reminders to:       Comments:   Winston YEAGER      Anticoagulation Care Providers     Provider Role Specialty Phone number    Lizzy Haywood M.D. Referring Cardiology [phone]    [facility] Anticoagulation Services Responsible  [phone]    Vladimir Taylor, PharmD Responsible          Anticoagulation Patient Findings          Spoke with [place] to report a therapeutic INR of 2.7.  Pt reports a planned epidural scheduled for 2020. Pt to STOP warfarin now.  Lovenox bridging is not indicated in this patient.  Pt to resume warfarin 24 hours s/p epidural. Follow up in 2 weeks, to reduce the risk of adverse events related to this high risk medication, warfarin.    Massiel Coleman, Clinical Pharmacist

## 2020-02-11 RX ORDER — ESTRADIOL 2 MG/1
TABLET ORAL
Qty: 90 TAB | Refills: 2 | Status: SHIPPED | OUTPATIENT
Start: 2020-02-11 | End: 2020-11-02 | Stop reason: SDUPTHER

## 2020-02-13 ENCOUNTER — TELEPHONE (OUTPATIENT)
Dept: VASCULAR LAB | Facility: MEDICAL CENTER | Age: 80
End: 2020-02-13

## 2020-02-14 ENCOUNTER — ANTICOAGULATION MONITORING (OUTPATIENT)
Dept: VASCULAR LAB | Facility: MEDICAL CENTER | Age: 80
End: 2020-02-14

## 2020-02-14 DIAGNOSIS — Z79.01 CHRONIC ANTICOAGULATION: ICD-10-CM

## 2020-02-14 LAB — INR PPP: 2.4 (ref 2–3.5)

## 2020-02-14 NOTE — PROGRESS NOTES
Anticoagulation Summary  As of 2020    INR goal:   2.0-3.0   TTR:   72.4 % (4.6 y)   INR used for dosin.40 (2020)   Warfarin maintenance plan:   3.75 mg (2.5 mg x 1.5) every Mon, Wed, Fri; 2.5 mg (2.5 mg x 1) all other days   Weekly warfarin total:   21.25 mg   Plan last modified:   Peter ContrerasD (2020)   Next INR check:   2020   Target end date:   Indefinite    Indications    Atrial fibrillation (HCC) (Resolved) [I48.91]  Chronic anticoagulation [Z79.01]             Anticoagulation Episode Summary     INR check location:   Home Draw    Preferred lab:       Send INR reminders to:       Comments:   Winston YEAGER      Anticoagulation Care Providers     Provider Role Specialty Phone number    Lizzy Haywood M.D. Referring Cardiology [phone]    [facility] Responsible  [phone]    Peter HollowayD Responsible          Anticoagulation Patient Findings      INR  therapeutic.   Left a voice message for the patient, asked patient to please call the anticoagulation clinic if they have any signs/symptoms of bleeding and/or thrombosis or any changes to diet or medications.    Follow up appointment in 1 week(s)    Continue weekly warfarin dose as noted      Fran Leyva, Tim, MS, BCACP, LCC    This note was created using voice recognition software (Dragon). The accuracy of the dictation is limited by the abilities of the software. I have reviewed the note prior to signing, however some errors in grammar and context are still possible. If you have any questions related to this note please do not hesitate to contact our office.

## 2020-02-17 LAB — INR PPP: 4.6 (ref 2–3.5)

## 2020-02-18 ENCOUNTER — ANTICOAGULATION MONITORING (OUTPATIENT)
Dept: VASCULAR LAB | Facility: MEDICAL CENTER | Age: 80
End: 2020-02-18

## 2020-02-18 DIAGNOSIS — Z79.01 CHRONIC ANTICOAGULATION: ICD-10-CM

## 2020-02-18 NOTE — PROGRESS NOTES
Anticoagulation Summary  As of 2020    INR goal:   2.0-3.0   TTR:   72.3 % (4.6 y)   INR used for dosin.60! (2020)   Warfarin maintenance plan:   3.75 mg (2.5 mg x 1.5) every Mon, Wed, Fri; 2.5 mg (2.5 mg x 1) all other days   Weekly warfarin total:   21.25 mg   Plan last modified:   Peter WhittenD (2020)   Next INR check:   2020   Target end date:   Indefinite    Indications    Atrial fibrillation (HCC) (Resolved) [I48.91]  Chronic anticoagulation [Z79.01]             Anticoagulation Episode Summary     INR check location:   Home Draw    Preferred lab:       Send INR reminders to:       Comments:   Winston YEAGER      Anticoagulation Care Providers     Provider Role Specialty Phone number    Lizzy Haywood M.D. Referring Cardiology [phone]    [facility] Responsible  [phone]    Vladimir Taylor, PharmD Responsible          Anticoagulation Patient Findings          HPI:  Donte Magaña, on anticoagulation therapy with warfarin for afib.   Changes to current medical/health status since last appt: none  Denies signs/symptoms of bleeding and/or thrombosis since the last appt.    Denies any interval changes to diet  Denies any interval changes to medications since last appt.   Denies any complications or cost restrictions with current therapy.     A/P   INR  SUPRA-therapeutic.   Hold warfarin x 2days then Pt is to continue with current warfarin dosing regimen.     Next INR in 3 days.     Juan Blanco, PharmD

## 2020-02-19 ENCOUNTER — TELEPHONE (OUTPATIENT)
Dept: VASCULAR LAB | Facility: MEDICAL CENTER | Age: 80
End: 2020-02-19

## 2020-02-21 ENCOUNTER — ANTICOAGULATION MONITORING (OUTPATIENT)
Dept: VASCULAR LAB | Facility: MEDICAL CENTER | Age: 80
End: 2020-02-21

## 2020-02-21 DIAGNOSIS — Z79.01 CHRONIC ANTICOAGULATION: ICD-10-CM

## 2020-02-21 LAB — INR PPP: 3 (ref 2–3.5)

## 2020-02-21 NOTE — PROGRESS NOTES
Anticoagulation Summary  As of 2/21/2020    INR goal:   2.0-3.0   TTR:   72.1 % (4.6 y)   INR used for dosing:   3.00 (2/21/2020)   Warfarin maintenance plan:   3.75 mg (2.5 mg x 1.5) every Wed; 2.5 mg (2.5 mg x 1) all other days   Weekly warfarin total:   18.75 mg   Plan last modified:   Kelli Ma PharmD (2/21/2020)   Next INR check:   2/26/2020   Target end date:   Indefinite    Indications    Atrial fibrillation (HCC) (Resolved) [I48.91]  Chronic anticoagulation [Z79.01]             Anticoagulation Episode Summary     INR check location:   Home Draw    Preferred lab:       Send INR reminders to:       Comments:   Winston YEAGER      Anticoagulation Care Providers     Provider Role Specialty Phone number    Lizzy Haywood M.D. Referring Cardiology [phone]    [facility] Responsible  [phone]    Vladimir Taylor, PharmD Responsible          Anticoagulation Patient Findings      Spoke with patient.  INR is SUPRA therapeutic.   Pt denies any unusual s/s of bleeding, bruising, clotting or any changes to diet or medications. Denies any etoh, cranberries, supplements, or illness.   Pt verifies warfarin weekly dosing.     Not clear why INR had increased, denies changes, sickness, or any issues.     Will have pt start a 11% reduced warfarin dose,     Repeat INR in 5 days.     Kelli Ma, PharmD

## 2020-02-26 ENCOUNTER — ANTICOAGULATION MONITORING (OUTPATIENT)
Dept: VASCULAR LAB | Facility: MEDICAL CENTER | Age: 80
End: 2020-02-26

## 2020-02-26 DIAGNOSIS — Z79.01 CHRONIC ANTICOAGULATION: ICD-10-CM

## 2020-02-26 DIAGNOSIS — L89.309 PRESSURE INJURY OF SKIN OF BUTTOCK, UNSPECIFIED INJURY STAGE, UNSPECIFIED LATERALITY: ICD-10-CM

## 2020-02-26 LAB — INR PPP: 2.3 (ref 2–3.5)

## 2020-02-26 NOTE — PROGRESS NOTES
Anticoagulation Summary  As of 2020    INR goal:   2.0-3.0   TTR:   72.2 % (4.6 y)   INR used for dosin.30 (2020)   Warfarin maintenance plan:   3.75 mg (2.5 mg x 1.5) every Wed; 2.5 mg (2.5 mg x 1) all other days   Weekly warfarin total:   18.75 mg   Plan last modified:   Kelli Ma PharmD (2020)   Next INR check:   3/4/2020   Target end date:   Indefinite    Indications    Atrial fibrillation (HCC) (Resolved) [I48.91]  Chronic anticoagulation [Z79.01]             Anticoagulation Episode Summary     INR check location:   Home Draw    Preferred lab:       Send INR reminders to:       Comments:   Winston YEAGER      Anticoagulation Care Providers     Provider Role Specialty Phone number    Lizzy Haywood M.D. Referring Cardiology [phone]    [facility] Respons[facility]  [phone]    Peter HollowayD Responsible          Anticoagulation Patient Findings      Spoke with patient's  to report a therapeutic INR.    Pt instructed to continue with current warfarin dosing regimen, confirms dosing.   Pt denies any s/s of bleeding, bruising, clotting or any changes to diet or medication.    Will follow up in 1 week(s).     Kelli Ma, PeterD

## 2020-03-02 ENCOUNTER — OFFICE VISIT (OUTPATIENT)
Dept: MEDICAL GROUP | Facility: PHYSICIAN GROUP | Age: 80
End: 2020-03-02
Payer: MEDICARE

## 2020-03-02 VITALS
BODY MASS INDEX: 33.12 KG/M2 | OXYGEN SATURATION: 93 % | HEIGHT: 64 IN | HEART RATE: 73 BPM | TEMPERATURE: 97.5 F | RESPIRATION RATE: 14 BRPM | DIASTOLIC BLOOD PRESSURE: 60 MMHG | WEIGHT: 194 LBS | SYSTOLIC BLOOD PRESSURE: 118 MMHG

## 2020-03-02 DIAGNOSIS — I25.10 CORONARY ARTERY DISEASE INVOLVING NATIVE CORONARY ARTERY OF NATIVE HEART WITHOUT ANGINA PECTORIS: ICD-10-CM

## 2020-03-02 DIAGNOSIS — F32.0 CURRENT MILD EPISODE OF MAJOR DEPRESSIVE DISORDER WITHOUT PRIOR EPISODE (HCC): ICD-10-CM

## 2020-03-02 DIAGNOSIS — R32 URINARY INCONTINENCE, UNSPECIFIED TYPE: ICD-10-CM

## 2020-03-02 DIAGNOSIS — R60.9 PERIPHERAL EDEMA: ICD-10-CM

## 2020-03-02 DIAGNOSIS — Z95.0 CARDIAC PACEMAKER IN SITU: ICD-10-CM

## 2020-03-02 DIAGNOSIS — I48.19 PERSISTENT ATRIAL FIBRILLATION (HCC): ICD-10-CM

## 2020-03-02 DIAGNOSIS — Z79.01 CHRONIC ANTICOAGULATION: ICD-10-CM

## 2020-03-02 DIAGNOSIS — E66.9 OBESITY (BMI 30-39.9): ICD-10-CM

## 2020-03-02 DIAGNOSIS — Z76.89 ENCOUNTER TO ESTABLISH CARE: ICD-10-CM

## 2020-03-02 DIAGNOSIS — I10 ESSENTIAL HYPERTENSION: ICD-10-CM

## 2020-03-02 DIAGNOSIS — N18.30 STAGE 3 CHRONIC KIDNEY DISEASE: ICD-10-CM

## 2020-03-02 DIAGNOSIS — M17.0 PRIMARY OSTEOARTHRITIS OF BOTH KNEES: ICD-10-CM

## 2020-03-02 DIAGNOSIS — L89.302 PRESSURE INJURY OF BUTTOCK, STAGE 2, UNSPECIFIED LATERALITY (HCC): ICD-10-CM

## 2020-03-02 PROCEDURE — 8041 PR SCP AHA: Performed by: NURSE PRACTITIONER

## 2020-03-02 PROCEDURE — 99214 OFFICE O/P EST MOD 30 MIN: CPT | Performed by: NURSE PRACTITIONER

## 2020-03-02 RX ORDER — OXYBUTYNIN CHLORIDE 5 MG/1
5 TABLET, EXTENDED RELEASE ORAL DAILY
COMMUNITY
Start: 2020-01-16 | End: 2020-11-05

## 2020-03-02 ASSESSMENT — PATIENT HEALTH QUESTIONNAIRE - PHQ9
6. FEELING BAD ABOUT YOURSELF - OR THAT YOU ARE A FAILURE OR HAVE LET YOURSELF OR YOUR FAMILY DOWN: SEVERAL DAYS
SUM OF ALL RESPONSES TO PHQ9 QUESTIONS 1 AND 2: 4
3. TROUBLE FALLING OR STAYING ASLEEP OR SLEEPING TOO MUCH: NOT AT ALL
SUM OF ALL RESPONSES TO PHQ QUESTIONS 1-9: 8
1. LITTLE INTEREST OR PLEASURE IN DOING THINGS: NEARLY EVERY DAY
5. POOR APPETITE OR OVEREATING: NOT AT ALL
7. TROUBLE CONCENTRATING ON THINGS, SUCH AS READING THE NEWSPAPER OR WATCHING TELEVISION: NOT AT ALL
2. FEELING DOWN, DEPRESSED, IRRITABLE, OR HOPELESS: SEVERAL DAYS
4. FEELING TIRED OR HAVING LITTLE ENERGY: NEARLY EVERY DAY
8. MOVING OR SPEAKING SO SLOWLY THAT OTHER PEOPLE COULD HAVE NOTICED. OR THE OPPOSITE, BEING SO FIGETY OR RESTLESS THAT YOU HAVE BEEN MOVING AROUND A LOT MORE THAN USUAL: NOT AT ALL
9. THOUGHTS THAT YOU WOULD BE BETTER OFF DEAD, OR OF HURTING YOURSELF: NOT AT ALL

## 2020-03-02 ASSESSMENT — FIBROSIS 4 INDEX: FIB4 SCORE: 1.52

## 2020-03-02 NOTE — ASSESSMENT & PLAN NOTE
New problem to examiner.  Patient currently has pacemaker in place.  She does have an appointment in May to establish new cardiologist and for pacemaker check.  She is currently on Coumadin, atenolol, and lisinopril.

## 2020-03-02 NOTE — ASSESSMENT & PLAN NOTE
New problem to examiner.  Patient reports a history of lupus.  She is not on any medication.  She is asymptomatic.

## 2020-03-02 NOTE — PROGRESS NOTES
Subjective:     CC:  Diagnoses of Essential hypertension, Urinary incontinence, unspecified type, Persistent atrial fibrillation (HCC), Pressure injury of buttock, stage 2, unspecified laterality (HCC), Current mild episode of major depressive disorder without prior episode (HCC), Coronary artery disease involving native coronary artery of native heart without angina pectoris, Peripheral edema, Primary osteoarthritis of both knees, History of lupus (HCC), Chronic anticoagulation, Cardiac pacemaker in situ, Encounter to establish care, and Stage 3 chronic kidney disease (HCC) were pertinent to this visit.    HISTORY OF THE PRESENT ILLNESS: Patient is a 79 y.o. female. This pleasant patient is here today with her  Cipriano to establish care and discuss the following. Her prior PCP was Dr. Price.      Persistent atrial fibrillation (HCC)  New problem to examiner.  Patient is currently on atenolol 50 mg twice a day and Coumadin per the Coumadin clinic.  Patient states that she does home testing for her INR and communicates her results to the Coumadin clinic.  Denies any easy bruising or bleeding at this time.    Peripheral edema  New problem to examiner.  Patient is currently taking spironolactone 50 mg daily.  She was previously on Lasix but the medication has been discontinued.  Denies any shortness of breath.    Essential hypertension  New problem to examiner.  Patient is currently on lisinopril 5 mg daily and atenolol 50 mg twice a day.  She does check her home blood pressure couple times a month, she reports normal readings.  She does not member any specific numbers.  Denies any chest pain, shortness of breath, dizziness, or palpitations.      History of lupus (HCC)  New problem to examiner.  Patient reports a history of lupus.  She is not on any medication.  She is asymptomatic.        Urinary incontinence  New problem to examiner.  Patient had bladder surgery 1/14/2020 by Dr. Robin.  Patient is currently  taking oxybutynin 5 mg daily and mirabegron 50 milligrams daily.  Patient states that Dr. Robin had added oxybutynin along with mirabegron.     Pressure injury of buttock, stage 2 (HCC)  New problem to examiner.  Patient states that this has been ongoing for months.  She has previously seen wound clinic for this.  She has no referral placed 2/26/2020 for wound care, she and her  are waiting on approval so they may schedule appointment.   is encouraging her to change positions frequently and they use Desitin to the skin.    Depression  New problem to examiner.  Patient is currently taking sertraline 50 mg daily.  She has been on this medication for years.  States that she is tolerating medication.  No issues today.  She denies any SI or harm to herself today.    Degenerative arthritis of knee, bilateral  New problem to examiner.  Patient currently takes acetaminophen as needed for knee pain.  She does use four-wheel walker.  Denies any falls.    Coronary artery disease involving native coronary artery of native heart  New problem to examiner.  Patient currently has pacemaker in place.  She does have an appointment in May to establish new cardiologist and for pacemaker check.  She is currently on Coumadin, atenolol, and lisinopril.    Chronic anticoagulation  New problem to examiner.  Patient is currently on Coumadin and followed by the Coumadin clinic.  Patient reports that when she is not having surgeries or procedures her INR normally runs between 2 and 3, which is her goal.    Cardiac pacemaker in situ  New problem to examiner.  Patient has appointment in May to establish new cardiologist and for pacemaker check.  She gets her pacemaker checked every 6 months.    Stage 3 chronic kidney disease (HCC)  New problem to examiner.  Patient currently on spironolactone 50 mg daily.  She was previously on Lasix but medication is been discontinued.  Her recent lab work is as follows:  Results for [facility]  STEPHANIE CABALLERO (MRN [id number]) as of 3/2/2020 14:12   Ref. Range 8/22/2019 07:49 9/10/2019 15:03 10/30/2019 12:39 1/10/2020 14:23   Bun Latest Ref Range: 8 - 22 mg/dL 26 (H) 26 (H) 28 (H) 29 (H)   Creatinine Latest Ref Range: 0.50 - 1.40 mg/dL 1.02 1.03 1.23 1.11   GFR If  Latest Ref Range: >60 mL/min/1.73 m 2 >60 >60 51 (A) 57 (A)   GFR If Non  Latest Ref Range: >60 mL/min/1.73 m 2 52 (A) 51 (A) 42 (A) 47 (A)       Allergies: Amiodarone; Bactrim; Cipro xr; Metoprolol; Morphine; Phytoplex z-guard [petrolatum-zinc oxide]; Pseudoephedrine; Qvar [beclomethasone dipropionate]; Vibramycin; Atorvastatin calcium-polysorbate 80; Augmentin; Diltiazem; Flecainide; Keflex; Mucinex; Tramadol; Atorvastatin; and Tape    Current Outpatient Medications Ordered in Epic   Medication Sig Dispense Refill   • estradiol (ESTRACE) 2 MG Tab TAKE ONE TABLET BY MOUTH EVERY DAY 90 Tab 2   • spironolactone (ALDACTONE) 50 MG Tab TAKE ONE TABLET BY MOUTH EVERY MORNING AND EVERY EVENING 180 Tab 2   • sertraline (ZOLOFT) 50 MG Tab Take 1 Tab by mouth every day. 90 Tab 3   • Triamcinolone Acetonide 0.025 % Lotion 1 Squirt by Apply externally route 2 Times a Day. Apply to itching ears. 1 Bottle 1   • Mirabegron ER (MYRBETRIQ) 50 MG TABLET SR 24 HR Take 50 mg by mouth every day. 90 Tab 3   • estradiol (ESTRACE) 0.1 MG/GM vaginal cream      • ipratropium (ATROVENT) 0.03 % Solution SPRAY 2 SPRAYS IN NOSE EVERY 12 HOURS. 30 mL 3   • levothyroxine (SYNTHROID) 50 MCG Tab Take 1 Tab by mouth every morning. ON A EMPTY STOMACH 90 Tab 3   • lisinopril (PRINIVIL) 5 MG Tab Take 1 Tab by mouth every day. 100 Tab 1   • warfarin (COUMADIN) 2.5 MG Tab Take 1-1.5 Tabs by mouth every day. As directed by the [facility] 135 Tab 2   • atenolol (TENORMIN) 50 MG Tab Take 1 Tab by mouth 2 times a day. 180 Tab 3   • nystatin (MYCOSTATIN) powder Apply up to 4 times a day to groin rash after washing with mild soap and water 30 g 1   •  "Acetaminophen 500 MG Cap Take 2 Caps by mouth 3 times a day.     • PREBIOTIC PRODUCT PO Take 2 Tabs by mouth every day.     • Lutein 20 MG Cap Take 1 Cap by mouth every day. 90 Cap 3   • vitamin D (CHOLECALCIFEROL) 1000 UNIT TABS Take 2,000 Units by mouth every day.     • oxybutynin SR (DITROPAN-XL) 5 MG TABLET SR 24 HR Take 5 mg by mouth every day.       No current [facility]-ordered facility-administered medications on file.        Past Medical History:   Diagnosis Date   • A-fib (HCC)    • Anesthesia     \"Tachycardia for 5 days after cataract surgery\"   • Anticoagulant long-term use 1/12/2012   • Arthritis     osteo-Knees, hips   • Atrial fibrillation (HCC)    • Backpain     R hip   • Bowel habit changes     diarrhea   • Breath shortness     with exertion, prn O2 2L   • Bronchitis Nov, 2013   • CAD (coronary artery disease)    • Depression    • Glaucoma 5/3/2011   • Hematoma complicating a procedure 11/3/2012   • Hemorrhagic disorder ([facility])     bruising/coumadin   • Hypertension    • Hypothyroid    • Lupus (HCC)    • Macular degeneration    • Menopause 1/12/2012   • Mitral regurgitation 10/30/2012   • Obesity 1/12/2012   • Pacemaker 2018   • Pneumonia feb,2013   • Pre-syncope 6/29/2018   • Pulmonary hypertension (HCC) 10/30/2012   • PVC's (premature ventricular contractions) 1/12/2012   • Senile nuclear sclerosis    • Spinal stenosis of lumbar region at multiple levels    • Unspecified cataract     repaired bilateral   • Unspecified urinary incontinence    • Urinary bladder disorder        Past Surgical History:   Procedure Laterality Date   • PB COMBINED ANT/POST COLPORRHAPHY  1/14/2020    Procedure: COLPORRHAPHY, COMBINED ANTEROPOSTERIOR - PERINEOPLASTY;  Surgeon: Waqas Robin M.D.;  Location: SURGERY SAME DAY [facility];  Service: Gynecology   • PB LAP,DIAGNOSTIC ABDOMEN  1/14/2020    Procedure: PELVISCOPY;  Surgeon: Waqas Robin M.D.;  Location: SURGERY SAME DAY [facility];  Service: Gynecology   • " ENTEROCELE REPAIR  1/14/2020    Procedure: REPAIR, ENTEROCELE;  Surgeon: Waqas Robin M.D.;  Location: SURGERY SAME DAY [facility];  Service: Gynecology   • BLADDER SLING FEMALE  1/14/2020    Procedure: BLADDER SLING, FEMALE - TOT;  Surgeon: Waqas Robin M.D.;  Location: SURGERY SAME DAY [facility];  Service: Gynecology   • VAGINAL SUSPENSION  1/14/2020    Procedure: COLPOPEXY - SACROSPINOUS VAULT SUSPENSION;  Surgeon: Waqas Robin M.D.;  Location: SURGERY SAME DAY [facility];  Service: Gynecology   • SALPINGO OOPHORECTOMY Bilateral 1/14/2020    Procedure: SALPINGO-OOPHORECTOMY;  Surgeon: Waqas Robin M.D.;  Location: SURGERY SAME DAY [facility];  Service: Gynecology   • IRRIGATION & DEBRIDEMENT ORTHO Right 2/14/2019    Procedure: IRRIGATION & DEBRIDEMENT ORTHO-HIP WOUND ;  Surgeon: Vladimir Lee M.D.;  Location: [facility];  Service: Orthopedics   • HIP ARTH ANTERIOR TOTAL Right 1/17/2019    Procedure: HIP ARTHROPLASTY ANTERIOR TOTAL;  Surgeon: Juan C Mercedes M.D.;  Location: [facility];  Service: Orthopedics   • PACEMAKER INSERTION  06/30/2018    Dual Chamber   • KNEE ARTHROPLASTY TOTAL Right 6/23/2016    Procedure: KNEE ARTHROPLASTY TOTAL;  Surgeon: Heriberto Lozada M.D.;  Location: [facility];  Service:    • KNEE ARTHROPLASTY TOTAL Left 5/28/2015    Procedure: KNEE ARTHROPLASTY TOTAL;  Surgeon: Heriberto Lozada M.D.;  Location: [facility];  Service:    • CATARACT PHACO WITH IOL Right 5/5/2015    Procedure: IOL OD - STANDARD;  Surgeon: Dmitry Bejarano M.D.;  Location: [facility];  Service:    • CATARACT PHACO WITH IOL  4/21/2015    Performed by Dmitry Bejarano M.D. at [facility]   • RECOVERY  11/30/2010    Performed by SURGERY, CATH-RECOVERY at [facility]   • COLONOSCOPY  2008    Normal    [facility]   • ABDOMINAL HYSTERECTOMY TOTAL  April 15,1975    still has ovaries   • OPEN  "REDUCTION      left ankle   • OTHER      Removed pins from left ankle   • OTHER CARDIAC SURGERY  12/2017 and 07/19/2018     Cardiac Ablation   • TONSILLECTOMY AND ADENOIDECTOMY         Social History     Tobacco Use   • Smoking status: Never Smoker   • Smokeless tobacco: Never Used   Substance Use Topics   • Alcohol use: No   • Drug use: No       Social History     Social History Narrative   • Not on file       Family History   Problem Relation Age of Onset   • Stroke Mother    • Diabetes Father    • Stroke Sister    • Heart Disease Brother    • Stroke Sister    • GI Disease Daughter         Crohn's Disease   • Heart Disease Daughter         CHF   • Other Daughter         Chronic Pain--Lymphedema   • Cancer Paternal Aunt         breast       Health Maintenance: Up-to-date.  Declines shingles vaccine.    ROS:   Gen: no fevers/chills, no changes in weight  Eyes: no changes in vision  ENT: no sore throat, no ear pain  Pulm: no sob, no cough  CV: no chest pain, no palpitations  GI: no nausea/vomiting, no diarrhea, no constipation, +dry mouth   : no dysuria, no urgency,+ frequency  MSk: no myalgias, no falls  Skin: no rash,+ pressure ulcer  Neuro: no headaches, no numbness/tingling, no dizziness  Heme/Lymph: no easy bruising      Objective:     Vital signs reviewed  Exam: /60 (BP Location: Right arm, Patient Position: Sitting, BP Cuff Size: Adult)   Pulse 73   Temp 36.4 °C (97.5 °F) (Temporal)   Resp 14   Ht 1.626 m (5' 4\")   Wt 88 kg (194 lb)   SpO2 93%  Body mass index is 33.3 kg/m².    General: Normal appearing. No distress.   present in exam room.  HENT: Normocephalic. Ears normal shape and contour, canals are clear bilaterally, tympanic membranes are benign, nasal mucosa benign, oropharynx is without erythema, edema or exudates.   Eyes: Eyes conjunctiva clear lids without ptosis, pupils equal and reactive to light accommodation, lids normal.  Glasses in place.  Neck: Supple without JVD. Thyroid " is not enlarged.  Pulmonary: Clear to ausculation.  Normal effort. No rales, ronchi, or wheezing.  Cardiovascular: Regular rate and rhythm without murmur. Radial pulses are intact and equal bilaterally.  Abdomen: Soft, nontender, nondistended. Normal bowel sounds.   Neurologic: Grossly nonfocal  Lymph: No cervical or supraclavicular lymph nodes are palpable  Skin: Warm and dry.  No obvious lesions.  Musculoskeletal: Slow gait with use of four-wheel walker.  No extremity cyanosis, clubbing, or edema.  Psych: Normal mood and affect.  Smiling.  Good eye contact.  Alert and oriented x3. Judgment and insight is normal.    Assessment & Plan:   79 y.o. female with the following -    1. Essential hypertension  Chronic stable.  New to me.  Continue lisinopril and atenolol.  Patient BP is at goal today, 118/60.  Heart rate 73.  No medication refill needed today.  Monitor.    2. Urinary incontinence, unspecified type  Chronic stable.  New to me.  Continue oxybutynin and mirabegron.  Continue follow-up with Dr. Robin.  Monitor.    3. Persistent atrial fibrillation (HCC)  Chronic stable.  New to me.  Continue follow-up with cardiology for pacemaker check every 6 months.  Continue atenolol and Coumadin.  Continue follow-up Coumadin clinic.  Monitor.    4. Chronic anticoagulation  Chronic stable.  New to me.  Continue follow-up with Coumadin clinic.  Monitor.    5. Pressure injury of buttock, stage 2, unspecified laterality (HCC)  Chronic unstable.  New to me.  Please make appoint with wound clinic when referral approved.  Discussed importance of changing positions at least every 2 hours.  Discussed for patient to stand during commercial breaks and watching TV.  Continue Desitin to skin as needed.  Monitor and follow.    6. Current mild episode of major depressive disorder without prior episode (HCC)  Chronic stable.  New to me.  Continue sertraline.  No medication refill needed today.  Monitor and follow.    7. Coronary artery  disease involving native coronary artery of native heart without angina pectoris  Chronic stable.  New to me.  Continue Coumadin, atenolol, and lisinopril.  Continue follow-up with cardiology.  Monitor.    8. Stage 3 chronic kidney disease (HCC)  Chronic stable.  New to me.  Monitor.    9. Peripheral edema  Chronic stable.  New to me.  Continue spironolactone.  Monitor and follow    10. Primary osteoarthritis of both knees  Chronic stable.  New to me.  Continue Tylenol as needed for pain.  Continue weightbearing exercise.  Monitor and follow.    11. Obesity (BMI 30-39.9)  Chronic stable.  New to me.  Encourage diet and lifestyle modifications.  Monitor.  - Patient identified as having weight management issue.  Appropriate orders and counseling given.    12. History of lupus (HCC)  Chronic stable.  New to me.  Patient not on any current medication or symptomatic.  Monitor.    13. Cardiac pacemaker in situ  Chronic stable.  New to me.  Continue follow-up with cardiology.  Continue 6-month pacemaker check.  Monitor.    14. Encounter to establish care  New issue to me.  Care established.  Patient up-to-date with her health maintenance and lab work.  Continue follow-up with cardiology, Coumadin clinic and wound center.  Monitor.        Return in about 3 months (around 6/2/2020) for pressure ulcer, htn.    Please note that this dictation was created using voice recognition software. I have made every reasonable attempt to correct obvious errors, but I expect that there are errors of grammar and possibly content that I did not discover before finalizing the note.

## 2020-03-02 NOTE — ASSESSMENT & PLAN NOTE
New problem to examiner.  Patient has appointment in May to establish new cardiologist and for pacemaker check.  She gets her pacemaker checked every 6 months.

## 2020-03-02 NOTE — ASSESSMENT & PLAN NOTE
New problem to examiner.  Patient is currently taking sertraline 50 mg daily.  She has been on this medication for years.  States that she is tolerating medication.  No issues today.  She denies any SI or harm to herself today.

## 2020-03-02 NOTE — ASSESSMENT & PLAN NOTE
New problem to examiner.  Patient currently on spironolactone 50 mg daily.  She was previously on Lasix but medication is been discontinued.  Her recent lab work is as follows:  Results for STEPHANIE DAVENPORT (MRN [id number]) as of 3/2/2020 14:12   Ref. Range 8/22/2019 07:49 9/10/2019 15:03 10/30/2019 12:39 1/10/2020 14:23   Bun Latest Ref Range: 8 - 22 mg/dL 26 (H) 26 (H) 28 (H) 29 (H)   Creatinine Latest Ref Range: 0.50 - 1.40 mg/dL 1.02 1.03 1.23 1.11   GFR If  Latest Ref Range: >60 mL/min/1.73 m 2 >60 >60 51 (A) 57 (A)   GFR If Non  Latest Ref Range: >60 mL/min/1.73 m 2 52 (A) 51 (A) 42 (A) 47 (A)

## 2020-03-02 NOTE — ASSESSMENT & PLAN NOTE
New problem to examiner.  Patient had bladder surgery 1/14/2020 by Dr. Robin.  Patient is currently taking oxybutynin 5 mg daily and mirabegron 50 milligrams daily.  Patient states that Dr. Robin had added oxybutynin along with mirabegron.

## 2020-03-02 NOTE — ASSESSMENT & PLAN NOTE
New problem to examiner.  Patient is currently on lisinopril 5 mg daily and atenolol 50 mg twice a day.  She does check her home blood pressure couple times a month, she reports normal readings.  She does not member any specific numbers.  Denies any chest pain, shortness of breath, dizziness, or palpitations.

## 2020-03-02 NOTE — ASSESSMENT & PLAN NOTE
New problem to examiner.  Patient is currently on Coumadin and followed by the Coumadin clinic.  Patient reports that when she is not having surgeries or procedures her INR normally runs between 2 and 3, which is her goal.

## 2020-03-02 NOTE — ASSESSMENT & PLAN NOTE
New problem to examiner.  Patient states that this has been ongoing for months.  She has previously seen wound clinic for this.  She has no referral placed 2/26/2020 for wound care, she and her  are waiting on approval so they may schedule appointment.   is encouraging her to change positions frequently and they use Desitin to the skin.

## 2020-03-02 NOTE — ASSESSMENT & PLAN NOTE
New problem to examiner.  Patient currently takes acetaminophen as needed for knee pain.  She does use four-wheel walker.  Denies any falls.

## 2020-03-02 NOTE — ASSESSMENT & PLAN NOTE
New problem to examiner.  Patient is currently on atenolol 50 mg twice a day and Coumadin per the Coumadin clinic.  Patient states that she does home testing for her INR and communicates her results to the Coumadin clinic.  Denies any easy bruising or bleeding at this time.

## 2020-03-02 NOTE — ASSESSMENT & PLAN NOTE
New problem to examiner.  Patient is currently taking spironolactone 50 mg daily.  She was previously on Lasix but the medication has been discontinued.  Denies any shortness of breath.

## 2020-03-04 ENCOUNTER — ANTICOAGULATION MONITORING (OUTPATIENT)
Dept: VASCULAR LAB | Facility: MEDICAL CENTER | Age: 80
End: 2020-03-04

## 2020-03-04 DIAGNOSIS — Z79.01 CHRONIC ANTICOAGULATION: ICD-10-CM

## 2020-03-04 LAB — INR PPP: 3.4 (ref 2–3.5)

## 2020-03-04 NOTE — PROGRESS NOTES
Anticoagulation Summary  As of 3/4/2020    INR goal:   2.0-3.0   TTR:   72.2 % (4.6 y)   INR used for dosing:   3.40! (3/4/2020)   Warfarin maintenance plan:   2.5 mg (2.5 mg x 1) every day   Weekly warfarin total:   17.5 mg   Plan last modified:   Kelli Ma, PharmD (3/4/2020)   Next INR check:   3/11/2020   Target end date:   Indefinite    Indications    Atrial fibrillation (HCC) (Resolved) [I48.91]  Chronic anticoagulation [Z79.01]             Anticoagulation Episode Summary     INR check location:   Home Draw    Preferred lab:       Send INR reminders to:       Comments:   Winston YEAGER      Anticoagulation Care Providers     Provider Role Specialty Phone number    Lizzy Haywood M.D. Referring Cardiology [phone]    [facility] Responsible  [phone]    Vladimir Taylor, PharmD Responsible          Anticoagulation Patient Findings      Spoke with patient.  INR is SUPRA therapeutic.   Pt denies any unusual s/s of bleeding, bruising, clotting or any changes to diet or medications. Denies any etoh, cranberries, supplements, or illness.   Pt verifies warfarin weekly dosing.     Will have pt start a 8% decreased weekly warfarin dose.     Repeat INR in 1 week(s).     Kelli Ma, PharmD

## 2020-03-11 ENCOUNTER — ANTICOAGULATION MONITORING (OUTPATIENT)
Dept: VASCULAR LAB | Facility: MEDICAL CENTER | Age: 80
End: 2020-03-11

## 2020-03-11 DIAGNOSIS — Z79.01 CHRONIC ANTICOAGULATION: ICD-10-CM

## 2020-03-11 LAB — INR PPP: 1.5 (ref 2–3.5)

## 2020-03-11 NOTE — PROGRESS NOTES
Anticoagulation Summary  As of 3/11/2020    INR goal:   2.0-3.0   TTR:   72.1 % (4.6 y)   INR used for dosin.50! (3/11/2020)   Warfarin maintenance plan:   2.5 mg (2.5 mg x 1) every day   Weekly warfarin total:   17.5 mg   Plan last modified:   Kelli Ma, PharmD (3/4/2020)   Next INR check:   3/18/2020   Target end date:   Indefinite    Indications    Atrial fibrillation (HCC) (Resolved) [I48.91]  Chronic anticoagulation [Z79.01]             Anticoagulation Episode Summary     INR check location:   Home Draw    Preferred lab:       Send INR reminders to:       Comments:   Winston YEAGER      Anticoagulation Care Providers     Provider Role Specialty Phone number    Lizzy Haywood M.D. Referring Cardiology [phone]    [facility] Responsible  [phone]    Vladimir Taylor, PharmD Responsible          Anticoagulation Patient Findings  Patient Findings     Negatives:   Signs/symptoms of thrombosis, Signs/symptoms of bleeding, Laboratory test error suspected, Change in health, Change in alcohol use, Change in activity, Upcoming invasive procedure, Emergency department visit, Upcoming dental procedure, Missed doses, Extra doses, Change in medications, Change in diet/appetite, Hospital admission, Bruising, Other complaints        Spoke with the patient on the phone today, reporting a SUB-therapeutic INR of 1.5. Confirmed the current warfarin dosing regimen and patient compliance. Patient denies any missed doses, but was not positive. Patient denies any interval changes to diet and/or medications. Patient denies any signs/symptoms of bleeding or clotting.   Patient will take a boost dose of 5mg TONIGHT ONLY, then will resume her current regimen. Patient will retest in 1 week.    Rayray GreenD

## 2020-03-13 ENCOUNTER — OFFICE VISIT (OUTPATIENT)
Dept: WOUND CARE | Facility: MEDICAL CENTER | Age: 80
End: 2020-03-13
Attending: FAMILY MEDICINE
Payer: MEDICARE

## 2020-03-13 VITALS
RESPIRATION RATE: 16 BRPM | DIASTOLIC BLOOD PRESSURE: 59 MMHG | TEMPERATURE: 97.1 F | HEART RATE: 85 BPM | SYSTOLIC BLOOD PRESSURE: 121 MMHG | OXYGEN SATURATION: 94 %

## 2020-03-13 DIAGNOSIS — L89.303 PRESSURE INJURY OF BUTTOCK, STAGE 3, UNSPECIFIED LATERALITY (HCC): ICD-10-CM

## 2020-03-13 DIAGNOSIS — E66.09 OBESITY DUE TO EXCESS CALORIES, UNSPECIFIED CLASSIFICATION, UNSPECIFIED WHETHER SERIOUS COMORBIDITY PRESENT: ICD-10-CM

## 2020-03-13 DIAGNOSIS — R54 AGE-RELATED PHYSICAL DEBILITY: ICD-10-CM

## 2020-03-13 PROCEDURE — 11042 DBRDMT SUBQ TIS 1ST 20SQCM/<: CPT

## 2020-03-13 PROCEDURE — 99213 OFFICE O/P EST LOW 20 MIN: CPT | Mod: 25

## 2020-03-13 PROCEDURE — 99999 PR NO CHARGE: CPT | Performed by: NURSE PRACTITIONER

## 2020-03-13 PROCEDURE — 11042 DBRDMT SUBQ TIS 1ST 20SQCM/<: CPT | Performed by: NURSE PRACTITIONER

## 2020-03-13 ASSESSMENT — ENCOUNTER SYMPTOMS
ROS GI COMMENTS: OCCASIONAL DIARRHEA
FEVER: 0
CONSTIPATION: 0
SHORTNESS OF BREATH: 0
DEPRESSION: 0
CHILLS: 0
NERVOUS/ANXIOUS: 0
DIARRHEA: 1
BACK PAIN: 1
NAUSEA: 0
COUGH: 0
VOMITING: 0
CLAUDICATION: 0

## 2020-03-13 ASSESSMENT — PAIN SCALES - GENERAL: PAINLEVEL: NO PAIN

## 2020-03-13 NOTE — PATIENT INSTRUCTIONS
Should you experience any significant changes in your wound(s) such as infection (redness, swelling, localized heat, increased pain, fever >101 F, chills) or have any questions regarding your home care instructions, please contact the wound center [phone]. If after hours, contact your primary care physician or go the hospital emergency room.  Keep dressing clean and dry and cover while bathing. Only change dressing if over saturated, soiled or its falling off.       Dressing change procedure -   Remove old dressing.  Cleanse the wound with saline and gauze.  Dry surrounding skin with gauze, then apply skin prep wipe, allow to dry.  Cut a small piece of MEPITEL and cover wound   Cut a larger piece of Aquacel silver and place this on top. Moisten with saline   Cover with transparent film dressing

## 2020-03-13 NOTE — PROGRESS NOTES
Provider Encounter- Pressure Injury    HISTORY OF PRESENT ILLNESS                              START OF CARE IN CLINIC: 3/13/2020               REFERRING PROVIDER: Kishore Price MD                 WOUND-pressure ulcer              LOCATION: Shallow stage III, medial right buttock wound                                  Shallow stage III, left buttock, mirroring each right buttock wound (kissing  wounds)              WOUND HISTORY: Patient is an 78 year old female who has had wounds to her buttocks off and on since December 2018. She is unsure of how the wounds started, but does state that she has a bad back and is in her recliner chair day and night.  She does get up several times per day to go to the bathroom, but otherwise does not ambulate much.  Her  brings her food, does all the cooking, cleaning and shopping.  She was seen previously for these wounds in the clinic, discharged in December 2019 due to healing.  She does have an EHOB waffle cushion on her chair.  She denies problems with incontinence.                  PERTINENT PMH: Obesity, limited mobility, chronic back pain, CAD, bilateral knee replacements, right hip replacement    Contributing factors: Immobility -yes.  Activity level: -Sedentary.  Support surfaces: -Waffle cushion to chair.  Incontinence: No.  Nutritional state: Well-nourished. Caregiver assistance: . Obesity: Yes. Moisture: Yes      DIABETES: No    TOBACCO USE: She has never smoked or used tobacco products      Interval History:  3/13/2020: Initial clinic visit with PHYLLIS Holguin.  Patient is well-known to me from previous treatment of same wounds.  These wounds started to reappear in January of this year.  She continues to spend approximately 23 out of 24 hours/day in her recliner.  She still has an EHOB waffle cushion in her chair.  She states she cannot get up and move more frequently due to chronic back and hip pain.  She has been unable to sleep in a bed due  "to these issues.  She has had physical therapy in the past in an effort to improve her strength, but states it had to be discontinued.              REVIEW OF SYSTEMS:   Review of Systems   Constitutional: Negative for chills and fever.   Respiratory: Negative for cough and shortness of breath.         Dry cough, \"allergies\"  Shortness of breath with exertion   Cardiovascular: Positive for leg swelling. Negative for chest pain and claudication.   Gastrointestinal: Positive for diarrhea. Negative for constipation, nausea and vomiting.        Occasional diarrhea   Genitourinary:        Frequent incontinence of urine  Occasional incontinence of stool  Wears absorptive pad   Musculoskeletal: Positive for back pain and joint pain.        Chronic back and hip pain, debilitating   Psychiatric/Behavioral: Negative for depression. The patient is not nervous/anxious.         On antidepressant, denies wanting to harm her self or others       PHYSICAL EXAMINATION:     Physical Exam   Constitutional: She is oriented to person, place, and time and well-developed, well-nourished, and in no distress.   Obese   HENT:   Head: Normocephalic.   Eyes: Pupils are equal, round, and reactive to light.   Pulmonary/Chest: Effort normal.   Musculoskeletal:         General: Edema present.      Comments: 2+ edema bilateral lower extremities  Uses walker for ambulation   Neurological: She is alert and oriented to person, place, and time.   Skin: Skin is warm.   Pressure ulcers of bilateral medial buttocks, \"kissing wounds\", both shallow stage III  Refer to wound flowsheet and photos   Psychiatric: Mood, memory, affect and judgment normal.       WOUND ASSESSMENT   Wound 03/13/20 Pressure Injury Buttocks Medial Left L medial buttock pressure ulcer stage 3 (Active)   Wound Image    3/13/2020  1:00 PM   Site Assessment Red;Yellow 3/13/2020  1:00 PM   Periwound Assessment Dry;Intact;Non-blanchable erythema 3/13/2020  1:00 PM   Margins Attached " edges;Defined edges 3/13/2020  1:00 PM   Closure Secondary intention 3/13/2020  1:00 PM   Drainage Amount Scant 3/13/2020  1:00 PM   Drainage Description Serosanguineous 3/13/2020  1:00 PM   Treatments Cleansed 3/13/2020  1:00 PM   Wound Cleansing Approved Wound Cleanser 3/13/2020  1:00 PM   Periwound Protectant Barrier Paste;Skin Protectant Wipes to Periwound 3/13/2020  1:00 PM   Dressing Cleansing/Solutions Normal Saline 3/13/2020  1:00 PM   Dressing Options Hydrofiber Silver;Mepitel One;Silicone Adhesive Foam;Hypafix Tape 3/13/2020  1:00 PM   Dressing Changed New 3/13/2020  1:00 PM   Dressing Status Clean;Dry;Intact 3/13/2020  1:00 PM   Wound Length (cm) 0.9 cm 3/13/2020  1:00 PM   Wound Width (cm) 1 cm 3/13/2020  1:00 PM   Wound Depth (cm) 0.1 cm 3/13/2020  1:00 PM   Wound Surface Area (cm^2) 0.9 cm^2 3/13/2020  1:00 PM   Wound Volume (cm^3) 0.09 cm^3 3/13/2020  1:00 PM   Post-Procedure Length (cm) 1 cm 3/13/2020  1:00 PM   Post-Procedure Width (cm) 1 cm 3/13/2020  1:00 PM   Post-Procedure Depth (cm) 0.2 cm 3/13/2020  1:00 PM   Post-Procedure Surface Area (cm^2) 1 cm^2 3/13/2020  1:00 PM   Post-Procedure Volume (cm^3) 0.2 cm^3 3/13/2020  1:00 PM   Wound Bed Granulation (%) 30 % 3/13/2020  1:00 PM   Wound Bed Slough (%) 70 % 3/13/2020  1:00 PM   Wound Bed Granulation (%) - Post-Procedure 100 % 3/13/2020  1:00 PM   Wound Odor None 3/13/2020  1:00 PM   Exposed Structures None 3/13/2020  1:00 PM       Wound 03/13/20 Pressure Injury Buttocks Medial Right R medial buttock pressure injury stage 3 (Active)   Wound Image    3/13/2020  1:00 PM   Site Assessment Red;Yellow 3/13/2020  1:00 PM   Periwound Assessment Dry;Intact 3/13/2020  1:00 PM   Margins Attached edges;Defined edges 3/13/2020  1:00 PM   Closure Secondary intention 3/13/2020  1:00 PM   Drainage Amount Scant 3/13/2020  1:00 PM   Drainage Description Serosanguineous 3/13/2020  1:00 PM   Treatments Cleansed 3/13/2020  1:00 PM   Wound Cleansing Approved Wound  Cleanser 3/13/2020  1:00 PM   Periwound Protectant Barrier Paste;Skin Protectant Wipes to Periwound 3/13/2020  1:00 PM   Dressing Cleansing/Solutions Normal Saline 3/13/2020  1:00 PM   Dressing Options Hydrofiber Silver;Transparent Film 3/13/2020  1:00 PM   Dressing Changed New 3/13/2020  1:00 PM   Dressing Status Clean;Dry;Intact 3/13/2020  1:00 PM   Dressing Change/Treatment Frequency Every 72 hrs, and As Needed 3/13/2020  1:00 PM   Wound Length (cm) 1 cm 3/13/2020  1:00 PM   Wound Width (cm) 1 cm 3/13/2020  1:00 PM   Wound Depth (cm) 0.1 cm 3/13/2020  1:00 PM   Wound Surface Area (cm^2) 1 cm^2 3/13/2020  1:00 PM   Wound Volume (cm^3) 0.1 cm^3 3/13/2020  1:00 PM   Post-Procedure Length (cm) 1.1 cm 3/13/2020  1:00 PM   Post-Procedure Width (cm) 1 cm 3/13/2020  1:00 PM   Post-Procedure Depth (cm) 0.2 cm 3/13/2020  1:00 PM   Post-Procedure Surface Area (cm^2) 1.1 cm^2 3/13/2020  1:00 PM   Post-Procedure Volume (cm^3) 0.22 cm^3 3/13/2020  1:00 PM   Wound Bed Granulation (%) 30 % 3/13/2020  1:00 PM   Wound Bed Slough (%) 70 % 3/13/2020  1:00 PM   Wound Bed Granulation (%) - Post-Procedure 100 % 3/13/2020  1:00 PM   Wound Odor None 3/13/2020  1:00 PM   Exposed Structures None 3/13/2020  1:00 PM          PROCEDURE: Excisional debridement of bilateral buttock ulcers  -2% viscous lidocaine applied topically to wound beds for approximately 5 minutes prior to debridement  -  4m curette used to debride wound beds.  Excisional debridement was performed to remove devitalized tissue until healthy, bleeding tissue was visualized.   Entire surface of wounds, 2.1cm2, debrided  Tissue debrided into the subcutaneous layer of both wound.    -Pain controlled with manual pressure   -Wound care completed by wound RN            PATIENT EDUCATION  -Etiology of pressure injury  -Importance of offloading  -Strategies for offloading in bed and when seated discussed and demonstrated  - Importance of adequate nutrition for wound  healing  -Increase protein intake (unless contraindicated by renal status)   - Advised to go to ER for any increased redness, swelling, drainage or odor, or if patient develops fever, chills, nausea or vomiting.    ASSESSMENT AND PLAN:     1. Pressure injury of buttock, stage 3, unspecified laterality (HCC)  Comments: Shallow stage III ulcers to bilateral medial buttocks, mirroring each other, kissing wounds.  Left buttock wound was initially noted to be closed, but has reopened    3/13/2020: Initial clinic visit.  Patient returns to clinic with recurrence of same wounds she was treated for previously.  -Excisional debridement of wound in clinic today, medically necessary to promote wound healing.  -Patient to return to clinic weekly for assessment and debridement  -Patient's  to change dressing 1-2 times per week in between clinic visits  -Offloading strategies discussed and demonstrated.  Patient to start continue waffle cushion in her chair, encouraged to reposition frequently while sitting, and to try and stand or walk for a few minutes every hour or so when awake.  We were able to find a Vartopia type cushion on [employer] for around $70.  Discussed with patient's  as this might be a much better pressure relieving option.    Wound care: Silicone contact layer, Silver Hydrofiber to manage exudate and bioburden, foam cover dressing, Hypafix tape    2. Age-related physical debility  Comments: Patient does not ambulate much, spends most of her day sitting due to physical weakness and chronic back pain.  Is scheduled for back surgery sometime in the next few months.    3/13/2020-patient encouraged to stand and walk at least for a few minutes every hour or so when awake  -Discussed physical therapy for strengthening.  Patient waiting for back surgery, may consider then.    3. Obesity due to excess calories, unspecified classification, unspecified whether serious comorbidity present  Comments: Complicating  factor.  Impaired wound healing potential.  Impaired mobility      Patient was seen for 30 minutes face to face of which > 50% of appointment time was spent on counseling and coordination of care regarding the above.      Please note that this dictation was created using voice recognition software. I have worked with technical experts from [facility] to optimize the interface.  I have made every reasonable attempt to correct obvious errors, but there may be errors of grammar and possibly content that I did not discover before finalizing the note.

## 2020-03-18 ENCOUNTER — ANTICOAGULATION MONITORING (OUTPATIENT)
Dept: VASCULAR LAB | Facility: MEDICAL CENTER | Age: 80
End: 2020-03-18

## 2020-03-18 DIAGNOSIS — Z79.01 CHRONIC ANTICOAGULATION: ICD-10-CM

## 2020-03-18 LAB — INR PPP: 2.3 (ref 2–3.5)

## 2020-03-18 NOTE — PROGRESS NOTES
Anticoagulation Summary  As of 3/18/2020    INR goal:   2.0-3.0   TTR:   71.9 % (4.7 y)   INR used for dosin.30 (3/18/2020)   Warfarin maintenance plan:   3.75 mg (2.5 mg x 1.5) every Fri; 2.5 mg (2.5 mg x 1) all other days   Weekly warfarin total:   18.75 mg   Plan last modified:   Vladimir Taylor PharmD (3/18/2020)   Next INR check:   3/25/2020   Target end date:   Indefinite    Indications    Atrial fibrillation (HCC) (Resolved) [I48.91]  Chronic anticoagulation [Z79.01]             Anticoagulation Episode Summary     INR check location:   Home Draw    Preferred lab:       Send INR reminders to:       Comments:   Winston YEAGER      Anticoagulation Care Providers     Provider Role Specialty Phone number    Lizzy Haywood M.D. Referring Cardiology [phone]    [facility] Responsible  [phone]    Vladimir Taylor, PharmD Responsible          Anticoagulation Patient Findings  Patient Findings     Negatives:   Signs/symptoms of thrombosis, Signs/symptoms of bleeding, Laboratory test error suspected, Change in health, Change in alcohol use, Change in activity, Upcoming invasive procedure, Emergency department visit, Upcoming dental procedure, Missed doses, Extra doses, Change in medications, Change in diet/appetite, Hospital admission, Bruising, Other complaints        Spoke with patient today regarding therapeutic INR of 2.3.  Patient denies any signs/symptoms of bruising or bleeding or any changes in diet and medications.  Instructed patient to call clinic with any questions or concerns.  Instructed patient to increase weekly warfarin regimen as detailed above.  Follow up in 1 weeks, to reduce risk of adverse events related to this high risk medication,  Warfarin.    Vladimir Taylor, PharmD, BCACP

## 2020-03-20 ENCOUNTER — APPOINTMENT (OUTPATIENT)
Dept: WOUND CARE | Facility: MEDICAL CENTER | Age: 80
End: 2020-03-20
Attending: FAMILY MEDICINE
Payer: MEDICARE

## 2020-03-25 ENCOUNTER — ANTICOAGULATION MONITORING (OUTPATIENT)
Dept: VASCULAR LAB | Facility: MEDICAL CENTER | Age: 80
End: 2020-03-25

## 2020-03-25 DIAGNOSIS — Z79.01 CHRONIC ANTICOAGULATION: ICD-10-CM

## 2020-03-25 LAB — INR PPP: 2.2 (ref 2–3.5)

## 2020-03-25 NOTE — PROGRESS NOTES
Anticoagulation Summary  As of 3/25/2020    INR goal:   2.0-3.0   TTR:   72.1 % (4.7 y)   INR used for dosin.20 (3/25/2020)   Warfarin maintenance plan:   3.75 mg (2.5 mg x 1.5) every Fri; 2.5 mg (2.5 mg x 1) all other days   Weekly warfarin total:   18.75 mg   Plan last modified:   Vladimir Taylor PharmD (3/18/2020)   Next INR check:   2020   Target end date:   Indefinite    Indications    Atrial fibrillation (HCC) (Resolved) [I48.91]  Chronic anticoagulation [Z79.01]             Anticoagulation Episode Summary     INR check location:   Home Draw    Preferred lab:       Send INR reminders to:       Comments:   Winston YEAGER      Anticoagulation Care Providers     Provider Role Specialty Phone number    Lizzy Haywood M.D. Referring Cardiology [phone]    [facility] Responsible  [phone]    Vladimir Taylor, PharmD Responsible          Anticoagulation Patient Findings  Patient Findings     Negatives:   Signs/symptoms of thrombosis, Signs/symptoms of bleeding, Laboratory test error suspected, Change in health, Change in alcohol use, Change in activity, Upcoming invasive procedure, Emergency department visit, Upcoming dental procedure, Missed doses, Extra doses, Change in medications, Change in diet/appetite, Hospital admission, Bruising, Other complaints         Spoke with the patient's  on the phone today, reporting a therapeutic INR of 2.2.  Confirmed the current warfarin dosing regimen and patient compliance. Patient denies any interval changes to diet and/or medications. Patient denies any signs/symptoms of bleeding or clotting.  Patient instructed to continue with the current warfarin dosing regimen, and asked to follow up again in 2 weeks (per pt request).    Rayray GreenD

## 2020-03-27 ENCOUNTER — APPOINTMENT (OUTPATIENT)
Dept: WOUND CARE | Facility: MEDICAL CENTER | Age: 80
End: 2020-03-27
Attending: FAMILY MEDICINE
Payer: MEDICARE

## 2020-03-28 DIAGNOSIS — B35.9 TINEA: ICD-10-CM

## 2020-03-30 RX ORDER — NYSTATIN 100000 [USP'U]/G
POWDER TOPICAL
Qty: 30 G | Refills: 0 | Status: SHIPPED | OUTPATIENT
Start: 2020-03-30 | End: 2020-08-31

## 2020-03-30 NOTE — TELEPHONE ENCOUNTER
Requested Prescriptions     Signed Prescriptions Disp Refills   • nystatin (MYCOSTATIN) powder 30 g 0     Sig: APPLY UP TO 4 TIMES A DAY TO GROIN RASH AFTER WASHING WITH MILD SOAP AND WATER     Authorizing Provider: NORBERT PARADA A.P.R.N.

## 2020-03-30 NOTE — TELEPHONE ENCOUNTER
Received request via: Pharmacy    Was the patient seen in the last year in this department? Yes lov 3/2/2020    Does the patient have an active prescription (recently filled or refills available) for medication(s) requested? No

## 2020-04-03 ENCOUNTER — APPOINTMENT (OUTPATIENT)
Dept: WOUND CARE | Facility: MEDICAL CENTER | Age: 80
End: 2020-04-03
Attending: FAMILY MEDICINE
Payer: MEDICARE

## 2020-04-04 DIAGNOSIS — Z79.01 CHRONIC ANTICOAGULATION: ICD-10-CM

## 2020-04-06 RX ORDER — WARFARIN SODIUM 2.5 MG/1
2.5-3.75 TABLET ORAL DAILY
Qty: 135 TAB | Refills: 1 | Status: SHIPPED | OUTPATIENT
Start: 2020-04-06 | End: 2020-12-21

## 2020-04-09 ENCOUNTER — ANTICOAGULATION MONITORING (OUTPATIENT)
Dept: VASCULAR LAB | Facility: MEDICAL CENTER | Age: 80
End: 2020-04-09

## 2020-04-09 DIAGNOSIS — Z79.01 CHRONIC ANTICOAGULATION: ICD-10-CM

## 2020-04-09 LAB — INR PPP: 2.8 (ref 2–3.5)

## 2020-04-09 NOTE — PROGRESS NOTES
Anticoagulation Summary  As of 2020    INR goal:   2.0-3.0   TTR:   72.3 % (4.7 y)   INR used for dosin.80 (2020)   Warfarin maintenance plan:   3.75 mg (2.5 mg x 1.5) every Fri; 2.5 mg (2.5 mg x 1) all other days   Weekly warfarin total:   18.75 mg   Plan last modified:   Vladimir Taylor, PharmD (3/18/2020)   Next INR check:   2020   Target end date:   Indefinite    Indications    Atrial fibrillation (HCC) (Resolved) [I48.91]  Chronic anticoagulation [Z79.01]             Anticoagulation Episode Summary     INR check location:   Home Draw    Preferred lab:       Send INR reminders to:       Comments:   Winston YEAGER      Anticoagulation Care Providers     Provider Role Specialty Phone number    Lizzy Haywood M.D. Referring Cardiology [phone]    [facility] Responsible  [phone]    Vladimir Taylor, PharmD Responsible          Anticoagulation Patient Findings          Spoke with Mr. Magaña to report a therapeutic INR of 2.8. Continue current dosing regimen.  Follow up in 2 weeks, to reduce the risk of adverse events related to this high risk medication, warfarin.    Massiel Coleman, Clinical Pharmacist

## 2020-04-10 ENCOUNTER — APPOINTMENT (OUTPATIENT)
Dept: WOUND CARE | Facility: MEDICAL CENTER | Age: 80
End: 2020-04-10
Attending: FAMILY MEDICINE
Payer: MEDICARE

## 2020-04-23 ENCOUNTER — ANTICOAGULATION MONITORING (OUTPATIENT)
Dept: VASCULAR LAB | Facility: MEDICAL CENTER | Age: 80
End: 2020-04-23

## 2020-04-23 DIAGNOSIS — Z79.01 CHRONIC ANTICOAGULATION: ICD-10-CM

## 2020-04-23 LAB — INR PPP: 2.3 (ref 2–3.5)

## 2020-04-23 NOTE — PROGRESS NOTES
Anticoagulation Summary  As of 2020    INR goal:   2.0-3.0   TTR:   72.5 % (4.8 y)   INR used for dosin.30 (2020)   Warfarin maintenance plan:   3.75 mg (2.5 mg x 1.5) every Fri; 2.5 mg (2.5 mg x 1) all other days   Weekly warfarin total:   18.75 mg   Plan last modified:   Vladimir Taylor PharmD (3/18/2020)   Next INR check:   2020   Target end date:   Indefinite    Indications    Atrial fibrillation (HCC) (Resolved) [I48.91]  Chronic anticoagulation [Z79.01]             Anticoagulation Episode Summary     INR check location:   Home Draw    Preferred lab:       Send INR reminders to:       Comments:   Winston YEAGER      Anticoagulation Care Providers     Provider Role Specialty Phone number    Lizzy Haywood M.D. Referring Cardiology [phone]    [facility] Responsible  [phone]    Vladimir Taylor PharmD Responsible          Anticoagulation Patient Findings        Spoke to patient on the phone.   INR  therapeutic.   Denies signs/symptoms of bleeding and/or thrombosis.   Denies changes to diet or medications.   Follow up appointment in 2 week(s).    Continue weekly warfarin dose as noted      Fran Leyva, Tim, MS, BCACP, LCC    This note was created using voice recognition software (Dragon). The accuracy of the dictation is limited by the abilities of the software. I have reviewed the note prior to signing, however some errors in grammar and context are still possible. If you have any questions related to this note please do not hesitate to contact our office.

## 2020-05-01 ENCOUNTER — OFFICE VISIT (OUTPATIENT)
Dept: CARDIOLOGY | Facility: MEDICAL CENTER | Age: 80
End: 2020-05-01
Payer: MEDICARE

## 2020-05-01 ENCOUNTER — NON-PROVIDER VISIT (OUTPATIENT)
Dept: CARDIOLOGY | Facility: MEDICAL CENTER | Age: 80
End: 2020-05-01
Payer: MEDICARE

## 2020-05-01 VITALS
WEIGHT: 198 LBS | RESPIRATION RATE: 16 BRPM | HEART RATE: 95 BPM | OXYGEN SATURATION: 95 % | BODY MASS INDEX: 33.8 KG/M2 | SYSTOLIC BLOOD PRESSURE: 126 MMHG | DIASTOLIC BLOOD PRESSURE: 60 MMHG | HEIGHT: 64 IN

## 2020-05-01 DIAGNOSIS — M48.00 SPINAL STENOSIS, MULTILEVEL: ICD-10-CM

## 2020-05-01 DIAGNOSIS — I49.5 SICK SINUS SYNDROME (HCC): ICD-10-CM

## 2020-05-01 DIAGNOSIS — I10 ESSENTIAL HYPERTENSION: ICD-10-CM

## 2020-05-01 DIAGNOSIS — I25.10 CORONARY ARTERY DISEASE INVOLVING NATIVE CORONARY ARTERY OF NATIVE HEART WITHOUT ANGINA PECTORIS: ICD-10-CM

## 2020-05-01 DIAGNOSIS — I48.19 PERSISTENT ATRIAL FIBRILLATION (HCC): ICD-10-CM

## 2020-05-01 DIAGNOSIS — R60.9 PERIPHERAL EDEMA: ICD-10-CM

## 2020-05-01 DIAGNOSIS — Z95.0 CARDIAC PACEMAKER IN SITU: ICD-10-CM

## 2020-05-01 DIAGNOSIS — Z79.899 HIGH RISK MEDICATION USE: ICD-10-CM

## 2020-05-01 DIAGNOSIS — N18.30 STAGE 3 CHRONIC KIDNEY DISEASE: ICD-10-CM

## 2020-05-01 DIAGNOSIS — Z79.01 CHRONIC ANTICOAGULATION: ICD-10-CM

## 2020-05-01 PROCEDURE — 93280 PM DEVICE PROGR EVAL DUAL: CPT | Performed by: INTERNAL MEDICINE

## 2020-05-01 PROCEDURE — 99214 OFFICE O/P EST MOD 30 MIN: CPT | Mod: 25 | Performed by: INTERNAL MEDICINE

## 2020-05-01 ASSESSMENT — ENCOUNTER SYMPTOMS
FEVER: 0
BRUISES/BLEEDS EASILY: 0
CLAUDICATION: 0
CHILLS: 0
PALPITATIONS: 0
PND: 0
GASTROINTESTINAL NEGATIVE: 1
MUSCULOSKELETAL NEGATIVE: 1
CONSTITUTIONAL NEGATIVE: 1
HEMOPTYSIS: 0
SPUTUM PRODUCTION: 0
COUGH: 0
DIZZINESS: 0
EYES NEGATIVE: 1
NEUROLOGICAL NEGATIVE: 1
CARDIOVASCULAR NEGATIVE: 1
STRIDOR: 0
WHEEZING: 0
SORE THROAT: 0
WEAKNESS: 0
LOSS OF CONSCIOUSNESS: 0
SHORTNESS OF BREATH: 1
ORTHOPNEA: 0

## 2020-05-01 ASSESSMENT — FIBROSIS 4 INDEX: FIB4 SCORE: 1.52

## 2020-05-01 NOTE — PROGRESS NOTES
"Chief Complaint   Patient presents with   • Coronary Artery Disease       Subjective:   Donte Magaña is a 79 y.o. female who presents today as a follow-up for her atrial fibrillation sick sinus syndrome status post pacemaker hypertension hyperlipidemia.  She had her pacemaker check today that showed 99% V pacing with no runs of atrial fibrillation.  She is having no chest pain.  She does have a stable exertional shortness of breath.  She does climb 7-8 steps to get up stairs and does not feel any worsening shortness of breath.  She is suffering with issues of urinary incontinence and therefore cannot tolerate the furosemide.  She is tolerating spironolactone 50 just fine.  She is scheduled to see a gynecologic urologist tomorrow.    Past Medical History:   Diagnosis Date   • A-fib (HCC)    • Anesthesia     \"Tachycardia for 5 days after cataract surgery\"   • Anticoagulant long-term use 1/12/2012   • Arthritis     osteo-Knees, hips   • Atrial fibrillation (HCC)    • Backpain     R hip   • Bowel habit changes     diarrhea   • Breath shortness     with exertion, prn O2 2L   • Bronchitis Nov, 2013   • CAD (coronary artery disease)    • Depression    • Glaucoma 5/3/2011   • Hematoma complicating a procedure 11/3/2012   • Hemorrhagic disorder (HCC)     bruising/coumadin   • Hypertension    • Hypothyroid    • Lupus (HCC)    • Macular degeneration    • Menopause 1/12/2012   • Mitral regurgitation 10/30/2012   • Obesity 1/12/2012   • Pacemaker 2018   • Pneumonia feb,2013   • Pre-syncope 6/29/2018   • Pulmonary hypertension (HCC) 10/30/2012   • PVC's (premature ventricular contractions) 1/12/2012   • Senile nuclear sclerosis    • Spinal stenosis of lumbar region at multiple levels    • Unspecified cataract     repaired bilateral   • Unspecified urinary incontinence    • Urinary bladder disorder      Past Surgical History:   Procedure Laterality Date   • PB COMBINED ANT/POST COLPORRHAPHY  1/14/2020    Procedure: " COLPORRHAPHY, COMBINED ANTEROPOSTERIOR - PERINEOPLASTY;  Surgeon: Waqas Robin M.D.;  Location: SURGERY SAME DAY [facility];  Service: Gynecology   • PB LAP,DIAGNOSTIC ABDOMEN  1/14/2020    Procedure: PELVISCOPY;  Surgeon: Waqas Robin M.D.;  Location: SURGERY SAME DAY [facility];  Service: Gynecology   • ENTEROCELE REPAIR  1/14/2020    Procedure: REPAIR, ENTEROCELE;  Surgeon: Waqas Robin M.D.;  Location: SURGERY SAME DAY [facility];  Service: Gynecology   • BLADDER SLING FEMALE  1/14/2020    Procedure: BLADDER SLING, FEMALE - TOT;  Surgeon: Waqas Robin M.D.;  Location: SURGERY SAME DAY [facility];  Service: Gynecology   • VAGINAL SUSPENSION  1/14/2020    Procedure: COLPOPEXY - SACROSPINOUS VAULT SUSPENSION;  Surgeon: Waqas Robin M.D.;  Location: SURGERY SAME DAY [facility];  Service: Gynecology   • SALPINGO OOPHORECTOMY Bilateral 1/14/2020    Procedure: SALPINGO-OOPHORECTOMY;  Surgeon: Waqas Robin M.D.;  Location: SURGERY SAME DAY [facility];  Service: Gynecology   • IRRIGATION & DEBRIDEMENT ORTHO Right 2/14/2019    Procedure: IRRIGATION & DEBRIDEMENT ORTHO-HIP WOUND ;  Surgeon: Vladimir Lee M.D.;  Location: [facility];  Service: Orthopedics   • HIP ARTH ANTERIOR TOTAL Right 1/17/2019    Procedure: HIP ARTHROPLASTY ANTERIOR TOTAL;  Surgeon: Juan C Mercedes M.D.;  Location: [facility];  Service: Orthopedics   • PACEMAKER INSERTION  06/30/2018    Dual Chamber   • KNEE ARTHROPLASTY TOTAL Right 6/23/2016    Procedure: KNEE ARTHROPLASTY TOTAL;  Surgeon: Heriberto Lozada M.D.;  Location: [facility];  Service:    • KNEE ARTHROPLASTY TOTAL Left 5/28/2015    Procedure: KNEE ARTHROPLASTY TOTAL;  Surgeon: Heriberto Lozada M.D.;  Location: [facility];  Service:    • CATARACT PHACO WITH IOL Right 5/5/2015    Procedure: IOL OD - STANDARD;  Surgeon: Dmitry Bejarano M.D.;  Location: [facility];  Service:    • CATARACT  PHACO WITH IOL  4/21/2015    Performed by Dmitry Bejarano M.D. at [facility]   • RECOVERY  11/30/2010    Performed by SURGERY, CATH-RECOVERY at SURGERY [facility] DAY [facility]   • COLONOSCOPY  2008    Normal    [facility]   • ABDOMINAL HYSTERECTOMY TOTAL  April 15,1975    still has ovaries   • OPEN REDUCTION      left ankle   • OTHER      Removed pins from left ankle   • OTHER CARDIAC SURGERY  12/2017 and 07/19/2018     Cardiac Ablation   • TONSILLECTOMY AND ADENOIDECTOMY       Family History   Problem Relation Age of Onset   • Stroke Mother    • Diabetes Father    • Stroke Sister    • Heart Disease Brother    • Stroke Sister    • GI Disease Daughter         Crohn's Disease   • Heart Disease Daughter         CHF   • Other Daughter         Chronic Pain--Lymphedema   • Cancer Paternal Aunt         breast     Social History     Socioeconomic History   • Marital status:      Spouse name: Not on file   • Number of children: Not on file   • Years of education: Not on file   • Highest education level: Not on file   Occupational History   • Not on file   Social Needs   • Financial resource strain: Not on file   • Food insecurity     Worry: Not on file     Inability: Not on file   • Transportation needs     Medical: Not on file     Non-medical: Not on file   Tobacco Use   • Smoking status: Never Smoker   • Smokeless tobacco: Never Used   Substance and Sexual Activity   • Alcohol use: No   • Drug use: No   • Sexual activity: Not Currently     Partners: Male     Birth control/protection: Post-Menopausal   Lifestyle   • Physical activity     Days per week: Not on file     Minutes per session: Not on file   • Stress: Not on file   Relationships   • Social connections     Talks on phone: Not on file     Gets together: Not on file     Attends [employer] service: Not on file     Active member of club or organization: Not on file     Attends meetings of clubs or organizations: Not on file     Relationship  "status: Not on file   • Intimate partner violence     Fear of current or ex partner: Not on file     Emotionally abused: Not on file     Physically abused: Not on file     Forced sexual activity: Not on file   Other Topics Concern   • Not on file   Social History Narrative   • Not on file     Allergies   Allergen Reactions   • Amiodarone Hives     Throat and tongue itching   • Bactrim Shortness of Breath   • Cipro Xr Swelling   • Metoprolol Swelling     Causes throat swelling   • Morphine Unspecified     Hallucinations   • Phytoplex Z-Guard [Petrolatum-Zinc Oxide] Unspecified     \"causes burning\"   • Pseudoephedrine Palpitations   • Qvar [Beclomethasone Dipropionate] Unspecified     Pressure on heart     • Vibramycin Shortness of Breath   • Atorvastatin Calcium-Polysorbate 80 Unspecified     Muscle aches     • Augmentin Unspecified     Unknown reaction   • Diltiazem Rash     rash   • Flecainide Unspecified     dizziness   • Keflex Unspecified     Pt states \"Unsure\".   • Mucinex Unspecified     GI Distress     • Tramadol Unspecified     crying   • Atorvastatin Myalgia   • Tape Rash     Paper tape okay     Outpatient Encounter Medications as of 5/1/2020   Medication Sig Dispense Refill   • warfarin (COUMADIN) 2.5 MG Tab TAKE 1-1.5 TABS BY MOUTH EVERY DAY. AS DIRECTED BY THE [facility] 135 Tab 1   • nystatin (MYCOSTATIN) powder APPLY UP TO 4 TIMES A DAY TO GROIN RASH AFTER WASHING WITH MILD SOAP AND WATER 30 g 0   • oxybutynin SR (DITROPAN-XL) 5 MG TABLET SR 24 HR Take 5 mg by mouth every day.     • estradiol (ESTRACE) 2 MG Tab TAKE ONE TABLET BY MOUTH EVERY DAY 90 Tab 2   • spironolactone (ALDACTONE) 50 MG Tab TAKE ONE TABLET BY MOUTH EVERY MORNING AND EVERY EVENING 180 Tab 2   • sertraline (ZOLOFT) 50 MG Tab Take 1 Tab by mouth every day. 90 Tab 3   • Triamcinolone Acetonide 0.025 % Lotion 1 Squirt by Apply externally route 2 Times a Day. Apply to itching ears. 1 Bottle 1   • Mirabegron ER " "(MYRBETRIQ) 50 MG TABLET SR 24 HR Take 50 mg by mouth every day. 90 Tab 3   • estradiol (ESTRACE) 0.1 MG/GM vaginal cream      • ipratropium (ATROVENT) 0.03 % Solution SPRAY 2 SPRAYS IN NOSE EVERY 12 HOURS. 30 mL 3   • levothyroxine (SYNTHROID) 50 MCG Tab Take 1 Tab by mouth every morning. ON A EMPTY STOMACH 90 Tab 3   • lisinopril (PRINIVIL) 5 MG Tab Take 1 Tab by mouth every day. 100 Tab 1   • atenolol (TENORMIN) 50 MG Tab Take 1 Tab by mouth 2 times a day. 180 Tab 3   • Acetaminophen 500 MG Cap Take 2 Caps by mouth 3 times a day.     • PREBIOTIC PRODUCT PO Take 2 Tabs by mouth every day.     • Lutein 20 MG Cap Take 1 Cap by mouth every day. 90 Cap 3   • vitamin D (CHOLECALCIFEROL) 1000 UNIT TABS Take 2,000 Units by mouth every day.       No facility-administered encounter medications on file as of 5/1/2020.      Review of Systems   Constitutional: Negative.  Negative for chills, fever and malaise/fatigue.   HENT: Negative.  Negative for sore throat.    Eyes: Negative.    Respiratory: Positive for shortness of breath. Negative for cough, hemoptysis, sputum production, wheezing and stridor.    Cardiovascular: Negative.  Negative for chest pain, palpitations, orthopnea, claudication, leg swelling and PND.   Gastrointestinal: Negative.    Genitourinary: Negative.    Musculoskeletal: Negative.    Skin: Negative.    Neurological: Negative.  Negative for dizziness, loss of consciousness and weakness.   Endo/Heme/Allergies: Negative.  Does not bruise/bleed easily.   All other systems reviewed and are negative.       Objective:   /60 (BP Location: Right arm, Patient Position: Sitting, BP Cuff Size: Adult)   Pulse 95   Resp 16   Ht 1.626 m (5' 4\")   Wt 89.8 kg (198 lb)   LMP 01/01/1993   SpO2 95%   BMI 33.99 kg/m²     Physical Exam   Constitutional: She appears well-developed and well-nourished. No distress.   HENT:   Head: Normocephalic and atraumatic.   Right Ear: External ear normal.   Left Ear: External " ear normal.   Nose: Nose normal.   Mouth/Throat: No oropharyngeal exudate.   Eyes: Pupils are equal, round, and reactive to light. Conjunctivae and EOM are normal. Right eye exhibits no discharge. Left eye exhibits no discharge. No scleral icterus.   Neck: Neck supple. No JVD present.   Cardiovascular: Normal rate, regular rhythm and intact distal pulses. Exam reveals no gallop and no friction rub.   No murmur heard.  Pulmonary/Chest: Effort normal. No stridor. No respiratory distress. She has no wheezes. She has no rales. She exhibits no tenderness.   Abdominal: Soft. She exhibits no distension. There is no guarding.   Musculoskeletal: Normal range of motion.         General: No tenderness, deformity or edema.   Neurological: She is alert. She has normal reflexes. She displays normal reflexes. No cranial nerve deficit. She exhibits normal muscle tone. Coordination normal.   Skin: Skin is warm and dry. No rash noted. She is not diaphoretic. No erythema. No pallor.   Psychiatric: She has a normal mood and affect. Her behavior is normal. Judgment and thought content normal.   Nursing note and vitals reviewed.      Assessment:     1. Cardiac pacemaker in situ     2. Chronic anticoagulation     3. Coronary artery disease involving native coronary artery of native heart without angina pectoris     4. Essential hypertension     5. Persistent atrial fibrillation (HCC)     6. Peripheral edema     7. Spinal stenosis, multilevel     8. Stage 3 chronic kidney disease (HCC)     9. High risk medication use         Medical Decision Making:  Today's Assessment / Status / Plan:     79-year-old female with sick sinus syndrome status post pacemaker doing well.  We will keep her on the atenolol for her atrial fibrillation.  For her high blood pressure still stay on lisinopril.  For her lower extremity edema she will stay on the spironolactone.  We will keep her on the warfarin for her atrial fibrillation.  Her labs are appropriate for  her high risk medication usage.  We will see her back in 6 months.

## 2020-05-03 ENCOUNTER — PATIENT MESSAGE (OUTPATIENT)
Dept: MEDICAL GROUP | Facility: PHYSICIAN GROUP | Age: 80
End: 2020-05-03

## 2020-05-03 DIAGNOSIS — L89.302 PRESSURE INJURY OF BUTTOCK, STAGE 2, UNSPECIFIED LATERALITY (HCC): ICD-10-CM

## 2020-05-04 DIAGNOSIS — I10 ESSENTIAL HYPERTENSION: ICD-10-CM

## 2020-05-04 RX ORDER — ATENOLOL 50 MG/1
50 TABLET ORAL 2 TIMES DAILY
Qty: 200 TAB | Refills: 3 | Status: SHIPPED | OUTPATIENT
Start: 2020-05-04 | End: 2021-05-17

## 2020-05-04 NOTE — TELEPHONE ENCOUNTER
"From: Donte Magaña  To: VICTORIANO Montes  Sent: 5/3/2020 8:47 AM PDT  Subject: Procedure Question    I have \"Pressure wound\" on my butt. Dr Price referred me to the wound center. After several visits, I cancelled the next appoints because of the virus problem. I would like to continue but the wound center said I need a new referral because my last visit was over a month.    Thanks for your help  Donte Magaña  "

## 2020-05-07 ENCOUNTER — ANTICOAGULATION MONITORING (OUTPATIENT)
Dept: VASCULAR LAB | Facility: MEDICAL CENTER | Age: 80
End: 2020-05-07

## 2020-05-07 DIAGNOSIS — Z79.01 CHRONIC ANTICOAGULATION: ICD-10-CM

## 2020-05-07 LAB — INR PPP: 3.2 (ref 2–3.5)

## 2020-05-07 NOTE — PROGRESS NOTES
Attempted call to patient but n/a and no VM  Will try again later    5/7/20 9:17am  Rayray Damon  PharmD       Anticoagulation Summary  As of 5/7/2020    INR goal:   2.0-3.0   TTR:   72.6 % (4.8 y)   INR used for dosing:   3.20! (5/7/2020)   Warfarin maintenance plan:   3.75 mg (2.5 mg x 1.5) every Fri; 2.5 mg (2.5 mg x 1) all other days   Weekly warfarin total:   18.75 mg   Plan last modified:   Vladimir Taylor, PharmD (3/18/2020)   Next INR check:   5/21/2020   Target end date:   Indefinite    Indications    Atrial fibrillation (HCC) (Resolved) [I48.91]  Chronic anticoagulation [Z79.01]             Anticoagulation Episode Summary     INR check location:   Home Draw    Preferred lab:       Send INR reminders to:       Comments:   Winston YEAGER      Anticoagulation Care Providers     Provider Role Specialty Phone number    Lizzy Haywood M.D. Referring Cardiology [phone]    [facility] Responsible  [phone]    Vladimir Taylor, PharmD Responsible          Anticoagulation Patient Findings  Patient Findings     Positives:   Change in medications    Negatives:   Signs/symptoms of thrombosis, Signs/symptoms of bleeding, Laboratory test error suspected, Change in health, Change in alcohol use, Change in activity, Upcoming invasive procedure, Emergency department visit, Upcoming dental procedure, Missed doses, Extra doses, Change in diet/appetite, Hospital admission, Bruising, Other complaints    Comments:   Started on Vesicare         Spoke with the patient on the phone today, reporting a SUPRA-therapeutic INR of 3.2. Confirmed the current warfarin dosing regimen and patient compliance. Only interval change is patient started on Vesicare. No impending interaction. Patient denies any interval changes to diet. Patient denies any signs/symptoms of bleeding or clotting.  Patient instructed to HOLD warfarin dose TONIGHT, then to resume her current dosing regimen. Patient will follow up again in 2  chuck.     Rayray Damon PharmD

## 2020-05-09 DIAGNOSIS — E03.8 OTHER SPECIFIED HYPOTHYROIDISM: ICD-10-CM

## 2020-05-11 ENCOUNTER — NON-PROVIDER VISIT (OUTPATIENT)
Dept: WOUND CARE | Facility: MEDICAL CENTER | Age: 80
End: 2020-05-11
Attending: NURSE PRACTITIONER
Payer: MEDICARE

## 2020-05-11 PROCEDURE — 99211 OFF/OP EST MAY X REQ PHY/QHP: CPT

## 2020-05-11 PROCEDURE — 97602 WOUND(S) CARE NON-SELECTIVE: CPT

## 2020-05-11 RX ORDER — LEVOTHYROXINE SODIUM 0.05 MG/1
TABLET ORAL
Qty: 90 TAB | Refills: 1 | Status: SHIPPED | OUTPATIENT
Start: 2020-05-11 | End: 2020-11-09

## 2020-05-11 NOTE — TELEPHONE ENCOUNTER
Requested Prescriptions     Signed Prescriptions Disp Refills   • levothyroxine (SYNTHROID) 50 MCG Tab 90 Tab 1     Sig: TAKE 1 TABLET BY MOUTH EVERY MORNING ON A EMPTY STOMACH     Authorizing Provider: NORBERT PARADA A.P.R.N.

## 2020-05-11 NOTE — CERTIFICATION
Non Provider Encounter- Pressure Injury          HISTORY OF PRESENT ILLNESS  Wound History:    START OF CARE IN CLINIC: 5/11/2020    REFERRING PROVIDER: PHYLLIS Montes     WOUND- Pressure Injury.    LOCATION AND STAGE: Shallow Stage III   HISTORY:  Taken from PHYLLIS Tsang note 3/13/2020:  Patient is an 78 year old female who has had wounds to her buttocks off and on since December 2018. She is unsure of how the wounds started, but does state that she has a bad back and is in her recliner chair day and night.  She does get up several times per day to go to the bathroom, but otherwise does not ambulate much.  Her  brings her food, does all the cooking, cleaning and shopping.  She was seen previously for these wounds in the clinic, discharged in December 2019 due to healing.  She does have an EHOB waffle cushion on her chair.  She denies problems with incontinence.    5/11/2020 Pt continues to sit in recliner for approximately 23 hours of the day only getting up to use the rest room.  She has severe back and other pain and is limited in offloading options.  She does have her waffle cushion at home and bought another online that she will bring to her next visit.  She has been applying goldbond with 4% lidocaine to her gluteal wounds.      Pertinent Medical History:    Contributing factors: Immobility yes.  Activity level: sedentary.  Support surfaces: EHOB waffle cushion.  Incontinence: denies.  Nutritional state: well-nourished. Caregiver assistance: spouse. Obesity: yes. Moisture: yes       TOBACCO USE: denies  Diabetes: denies        Pertinent Labs and Diagnostics:    Labs:     A1c:   Lab Results   Component Value Date/Time    HBA1C 5.3 02/13/2019 07:57 PM          IMAGING: none related to wounds    VASCULAR STUDIES: n/a    LAST  WOUND CULTURE:  DATE : 2/14/2019         Fall Risk Assessment (jaydon all that apply with an X):  Completed    X65 years or older     Fall within the last 2 years  "  XUses ambulatory devices -walker  Loss of protective sensation in feet   Use of prostethic/orthotic    Presence of lower extremity/foot/toe amputation   XTaking medication that increases risk (per facility policy)    Interventions Recommended (if any of the above are selected):   XUse of Assistive Device:   Supervision with ambulation: Caregiver   Assistance with ambulation: Caregiver   Mariana safety education: Educational material provided       PAST MEDICAL HISTORY:   Past Medical History:   Diagnosis Date   • A-fib ([facility])    • Anesthesia     \"Tachycardia for 5 days after cataract surgery\"   • Anticoagulant long-term use 1/12/2012   • Arthritis     osteo-Knees, hips   • Atrial fibrillation ([facility])    • Backpain     R hip   • Bowel habit changes     diarrhea   • Breath shortness     with exertion, prn O2 2L   • Bronchitis Nov, 2013   • CAD (coronary artery disease)    • Depression    • Glaucoma 5/3/2011   • Hematoma complicating a procedure 11/3/2012   • Hemorrhagic disorder ([facility])     bruising/coumadin   • Hypertension    • Hypothyroid    • Lupus ([facility])    • Macular degeneration    • Menopause 1/12/2012   • Mitral regurgitation 10/30/2012   • Obesity 1/12/2012   • Pacemaker 2018   • Pneumonia feb,2013   • Pre-syncope 6/29/2018   • Pulmonary hypertension ([facility]) 10/30/2012   • PVC's (premature ventricular contractions) 1/12/2012   • Senile nuclear sclerosis    • Spinal stenosis of lumbar region at multiple levels    • Unspecified cataract     repaired bilateral   • Unspecified urinary incontinence    • Urinary bladder disorder        PAST SURGICAL HISTORY:   Past Surgical History:   Procedure Laterality Date   • PB COMBINED ANT/POST COLPORRHAPHY  1/14/2020    Procedure: COLPORRHAPHY, COMBINED ANTEROPOSTERIOR - PERINEOPLASTY;  Surgeon: Waqas Robin M.D.;  Location: SURGERY SAME DAY [facility];  Service: Gynecology   • PB LAP,DIAGNOSTIC ABDOMEN  1/14/2020    Procedure: PELVISCOPY;  Surgeon: Waqas Robin M.D.;  " Location: SURGERY SAME DAY [facility];  Service: Gynecology   • ENTEROCELE REPAIR  1/14/2020    Procedure: REPAIR, ENTEROCELE;  Surgeon: Waqas Robin M.D.;  Location: SURGERY SAME DAY [facility];  Service: Gynecology   • BLADDER SLING FEMALE  1/14/2020    Procedure: BLADDER SLING, FEMALE - TOT;  Surgeon: Waqas Robin M.D.;  Location: SURGERY SAME DAY [facility];  Service: Gynecology   • VAGINAL SUSPENSION  1/14/2020    Procedure: COLPOPEXY - SACROSPINOUS VAULT SUSPENSION;  Surgeon: Waqas Robin M.D.;  Location: SURGERY SAME DAY [facility];  Service: Gynecology   • SALPINGO OOPHORECTOMY Bilateral 1/14/2020    Procedure: SALPINGO-OOPHORECTOMY;  Surgeon: Waqas Robin M.D.;  Location: SURGERY SAME DAY [facility];  Service: Gynecology   • IRRIGATION & DEBRIDEMENT ORTHO Right 2/14/2019    Procedure: IRRIGATION & DEBRIDEMENT ORTHO-HIP WOUND ;  Surgeon: Vladimir Lee M.D.;  Location: [facility];  Service: Orthopedics   • HIP ARTH ANTERIOR TOTAL Right 1/17/2019    Procedure: HIP ARTHROPLASTY ANTERIOR TOTAL;  Surgeon: Juan C Mercedes M.D.;  Location: [facility];  Service: Orthopedics   • PACEMAKER INSERTION  06/30/2018    Dual Chamber   • KNEE ARTHROPLASTY TOTAL Right 6/23/2016    Procedure: KNEE ARTHROPLASTY TOTAL;  Surgeon: Heriberto Lozada M.D.;  Location: [facility];  Service:    • KNEE ARTHROPLASTY TOTAL Left 5/28/2015    Procedure: KNEE ARTHROPLASTY TOTAL;  Surgeon: Heriberto Lozada M.D.;  Location: [facility];  Service:    • CATARACT PHACO WITH IOL Right 5/5/2015    Procedure: IOL OD - STANDARD;  Surgeon: Dmitry Bejarano M.D.;  Location: [facility];  Service:    • CATARACT PHACO WITH IOL  4/21/2015    Performed by Dmitry Bejarano M.D. at [facility]   • RECOVERY  11/30/2010    Performed by SURGERY, CATH-RECOVERY at [facility] SAME DAY [facility]   • COLONOSCOPY  2008    Normal    [facility]   • ABDOMINAL  "HYSTERECTOMY TOTAL  April 15,1975    still has ovaries   • OPEN REDUCTION      left ankle   • OTHER      Removed pins from left ankle   • OTHER CARDIAC SURGERY  12/2017 and 07/19/2018     Cardiac Ablation   • TONSILLECTOMY AND ADENOIDECTOMY          MEDICATIONS:   Current Outpatient Medications   Medication   • levothyroxine (SYNTHROID) 50 MCG Tab   • atenolol (TENORMIN) 50 MG Tab   • warfarin (COUMADIN) 2.5 MG Tab   • nystatin (MYCOSTATIN) powder   • oxybutynin SR (DITROPAN-XL) 5 MG TABLET SR 24 HR   • estradiol (ESTRACE) 2 MG Tab   • spironolactone (ALDACTONE) 50 MG Tab   • sertraline (ZOLOFT) 50 MG Tab   • Triamcinolone Acetonide 0.025 % Lotion   • Mirabegron ER (MYRBETRIQ) 50 MG TABLET SR 24 HR   • estradiol (ESTRACE) 0.1 MG/GM vaginal cream   • ipratropium (ATROVENT) 0.03 % Solution   • lisinopril (PRINIVIL) 5 MG Tab   • Acetaminophen 500 MG Cap   • PREBIOTIC PRODUCT PO   • Lutein 20 MG Cap   • vitamin D (CHOLECALCIFEROL) 1000 UNIT TABS     No current facility-administered medications for this visit.        ALLERGIES:    Allergies   Allergen Reactions   • Amiodarone Hives     Throat and tongue itching   • Bactrim Shortness of Breath   • Cipro Xr Swelling   • Metoprolol Swelling     Causes throat swelling   • Morphine Unspecified     Hallucinations   • Phytoplex Z-Guard [Petrolatum-Zinc Oxide] Unspecified     \"causes burning\"   • Pseudoephedrine Palpitations   • Qvar [Beclomethasone Dipropionate] Unspecified     Pressure on heart     • Vibramycin Shortness of Breath   • Atorvastatin Calcium-Polysorbate 80 Unspecified     Muscle aches     • Augmentin Unspecified     Unknown reaction   • Diltiazem Rash     rash   • Flecainide Unspecified     dizziness   • Keflex Unspecified     Pt states \"Unsure\".   • Mucinex Unspecified     GI Distress     • Tramadol Unspecified     crying   • Atorvastatin Myalgia   • Tape Rash     Paper tape okay         SOCIAL HISTORY:   Social History     Socioeconomic History   • Marital " status:      Spouse name: Not on file   • Number of children: Not on file   • Years of education: Not on file   • Highest education level: Not on file   Occupational History   • Not on file   Social Needs   • Financial resource strain: Not on file   • Food insecurity     Worry: Not on file     Inability: Not on file   • Transportation needs     Medical: Not on file     Non-medical: Not on file   Tobacco Use   • Smoking status: Never Smoker   • Smokeless tobacco: Never Used   Substance and Sexual Activity   • Alcohol use: No   • Drug use: No   • Sexual activity: Not Currently     Partners: Male     Birth control/protection: Post-Menopausal   Lifestyle   • Physical activity     Days per week: Not on file     Minutes per session: Not on file   • Stress: Not on file   Relationships   • Social connections     Talks on phone: Not on file     Gets together: Not on file     Attends Jew service: Not on file     Active member of club or organization: Not on file     Attends meetings of clubs or organizations: Not on file     Relationship status: Not on file   • Intimate partner violence     Fear of current or ex partner: Not on file     Emotionally abused: Not on file     Physically abused: Not on file     Forced sexual activity: Not on file   Other Topics Concern   • Not on file   Social History Narrative   • Not on file       FAMILY HISTORY:   Family History   Problem Relation Age of Onset   • Stroke Mother    • Diabetes Father    • Stroke Sister    • Heart Disease Brother    • Stroke Sister    • GI Disease Daughter         Crohn's Disease   • Heart Disease Daughter         CHF   • Other Daughter         Chronic Pain--Lymphedema   • Cancer Paternal Aunt         breast               WOUND ASSESSMENT    Took measurement and forgot the photo.  Please take on next visit.    Procedures:    Non-selective debridement using NS and gauze to remove biofilm         Wound 03/13/20 Pressure Injury Buttocks Medial Left L  medial buttock pressure ulcer stage 3 (Active)   Wound Image    3/13/2020  1:00 PM   Site Assessment Shiro 5/11/2020  1:00 PM   Periwound Assessment Dry;Intact;Blanchable erythema 5/11/2020  1:00 PM   Margins Attached edges 5/11/2020  1:00 PM   Closure Secondary intention 5/11/2020  1:00 PM   Drainage Amount Scant 5/11/2020  1:00 PM   Drainage Description Serosanguineous 5/11/2020  1:00 PM   Treatments Cleansed 5/11/2020  1:00 PM   Wound Cleansing Normal Saline Irrigation 5/11/2020  1:00 PM   Periwound Protectant Skin Protectant Wipes to Periwound 5/11/2020  1:00 PM   Dressing Cleansing/Solutions Normal Saline 5/11/2020  1:00 PM   Dressing Options Honey Colloid;Transparent Film 5/11/2020  1:00 PM   Dressing Changed New 5/11/2020  1:00 PM   Dressing Status Clean;Dry;Intact 5/11/2020  1:00 PM   Dressing Change/Treatment Frequency Every 72 hrs, and As Needed 5/11/2020  1:00 PM   Wound Length (cm) 0.7 cm 5/11/2020  1:00 PM   Wound Width (cm) 0.8 cm 5/11/2020  1:00 PM   Wound Depth (cm) 0 cm 5/11/2020  1:00 PM   Wound Surface Area (cm^2) 0.56 cm^2 5/11/2020  1:00 PM   Wound Volume (cm^3) 0 cm^3 5/11/2020  1:00 PM   Post-Procedure Length (cm) 0.7 cm 5/11/2020  1:00 PM   Post-Procedure Width (cm) 0.8 cm 5/11/2020  1:00 PM   Post-Procedure Depth (cm) 0 cm 5/11/2020  1:00 PM   Post-Procedure Surface Area (cm^2) 0.56 cm^2 5/11/2020  1:00 PM   Post-Procedure Volume (cm^3) 0 cm^3 5/11/2020  1:00 PM   Wound Healing % 100 5/11/2020  1:00 PM   Wound Bed Granulation (%) 30 % 3/13/2020  1:00 PM   Wound Bed Slough (%) 70 % 3/13/2020  1:00 PM   Wound Bed Granulation (%) - Post-Procedure 100 % 5/11/2020  1:00 PM   Wound Odor None 5/11/2020  1:00 PM   Exposed Structures None 5/11/2020  1:00 PM       Wound 03/13/20 Pressure Injury Buttocks Medial Right R medial buttock pressure injury stage 3 (Active)   Wound Image    3/13/2020  1:00 PM   Site Assessment Brown;Red;Yellow 5/11/2020  1:00 PM   Periwound Assessment Dry;Blanchable erythema  5/11/2020  1:00 PM   Margins Attached edges 5/11/2020  1:00 PM   Closure Secondary intention 5/11/2020  1:00 PM   Drainage Amount Small 5/11/2020  1:00 PM   Drainage Description Serosanguineous 5/11/2020  1:00 PM   Treatments Cleansed 5/11/2020  1:00 PM   Wound Cleansing Approved Wound Cleanser 5/11/2020  1:00 PM   Periwound Protectant Skin Protectant Wipes to Periwound 5/11/2020  1:00 PM   Dressing Cleansing/Solutions Normal Saline 5/11/2020  1:00 PM   Dressing Options Honey Colloid;Transparent Film 5/11/2020  1:00 PM   Dressing Changed New 5/11/2020  1:00 PM   Dressing Change/Treatment Frequency Every 72 hrs, and As Needed 5/11/2020  1:00 PM   Wound Length (cm) 0.5 cm 5/11/2020  1:00 PM   Wound Width (cm) 1.8 cm 5/11/2020  1:00 PM   Wound Depth (cm) 0.2 cm 5/11/2020  1:00 PM   Wound Surface Area (cm^2) 0.9 cm^2 5/11/2020  1:00 PM   Wound Volume (cm^3) 0.18 cm^3 5/11/2020  1:00 PM   Post-Procedure Length (cm) 0.5 cm 5/11/2020  1:00 PM   Post-Procedure Width (cm) 1.8 cm 5/11/2020  1:00 PM   Post-Procedure Depth (cm) 0.2 cm 5/11/2020  1:00 PM   Post-Procedure Surface Area (cm^2) 0.9 cm^2 5/11/2020  1:00 PM   Post-Procedure Volume (cm^3) 0.18 cm^3 5/11/2020  1:00 PM   Wound Healing % -80 5/11/2020  1:00 PM   Wound Bed Granulation (%) 50 % 5/11/2020  1:00 PM   Wound Bed Slough (%) 50 % 5/11/2020  1:00 PM   Wound Bed Granulation (%) - Post-Procedure 100 % 3/13/2020  1:00 PM   Wound Odor None 5/11/2020  1:00 PM   Exposed Structures None 5/11/2020  1:00 PM          PATIENT EDUCATION  Etiology of pressure injury  Importance of offloading and frequent repositioning - emphasized the importance of standing up and relieving pressure to buttock wounds.  Pt able to stand and bend over pillows for dressing changes.  Pt agreed to do this every 30 mins to relieve pressure from wounds.  She will also use her offloading cushion.     Pt  will change dsgs in between visits.    Strategies for offloading in bed and when seated  discussed and demonstrated  Importance of adequate nutrition for wound healing  Advised to go to ER for any increased redness, swelling, drainage or odor, or if patient develops fever, chills, nausea or vomiting.

## 2020-05-11 NOTE — PATIENT INSTRUCTIONS
Reviewed POC, importance of offloading (getting up and leaning over a bed with pillows similar to how we do dressing changes to relieve pressure from wounds), keeping the secondary dressing dry and intact, nutrition for wound healing, s/s of complications/infection, when to notify MD/go to ER.  Pt verbalized understanding to all.

## 2020-05-19 ENCOUNTER — OFFICE VISIT (OUTPATIENT)
Dept: WOUND CARE | Facility: MEDICAL CENTER | Age: 80
End: 2020-05-19
Attending: NURSE PRACTITIONER
Payer: MEDICARE

## 2020-05-19 VITALS
TEMPERATURE: 97.1 F | HEART RATE: 92 BPM | OXYGEN SATURATION: 95 % | SYSTOLIC BLOOD PRESSURE: 139 MMHG | DIASTOLIC BLOOD PRESSURE: 74 MMHG | RESPIRATION RATE: 20 BRPM

## 2020-05-19 PROCEDURE — 11042 DBRDMT SUBQ TIS 1ST 20SQCM/<: CPT | Performed by: NURSE PRACTITIONER

## 2020-05-19 PROCEDURE — 11042 DBRDMT SUBQ TIS 1ST 20SQCM/<: CPT

## 2020-05-19 PROCEDURE — 99213 OFFICE O/P EST LOW 20 MIN: CPT | Mod: 25

## 2020-05-19 ASSESSMENT — ENCOUNTER SYMPTOMS
SHORTNESS OF BREATH: 0
ROS GI COMMENTS: OCCASIONAL DIARRHEA
CONSTIPATION: 0
COUGH: 0
CLAUDICATION: 0
DEPRESSION: 0
NAUSEA: 0
CHILLS: 0
DIARRHEA: 1
NERVOUS/ANXIOUS: 0
VOMITING: 0
FEVER: 0
BACK PAIN: 1

## 2020-05-19 NOTE — PROGRESS NOTES
Provider Encounter- Pressure Injury    HISTORY OF PRESENT ILLNESS                              START OF CARE IN CLINIC: 3/13/2020               REFERRING PROVIDER: Kishore Price MD                 WOUND-pressure ulcer              LOCATION: Shallow stage III, medial right buttock wound                                  Shallow stage III, left buttock, mirroring each right buttock wound (kissing  wounds)              WOUND HISTORY: Patient is an 78 year old female who has had wounds to her buttocks off and on since December 2018. She is unsure of how the wounds started, but does state that she has a bad back and is in her recliner chair day and night.  She does get up several times per day to go to the bathroom, but otherwise does not ambulate much.  Her  brings her food, does all the cooking, cleaning and shopping.  She was seen previously for these wounds in the clinic, discharged in December 2019 due to healing.  She now has a Roho cushion for offloading for her chair.    5/19/2020 patient was seen by PHYLLIS Ivory (who later through my chart) referred the patient back to the wound clinic after a break due to fear of Covid-19 exposure.  Mrs. Magaña reports that she in fact does have incontinence issues.  She reports it is hard to keep her buttocks dry.  He has been applying Gold Bond with lidocaine to bilateral buttocks wounds.  The wounds today 5/19/2020 are superficial.  Patient reports that she has been offloading frequently and shifting left to right with towels, obviously she has been doing an adequate job since the wounds are superficial and near resolution at this time.  Patient returns to [facility] for treatment of right and left buttock ulcers.                  PERTINENT PMH: Obesity, limited mobility, chronic back pain, CAD, bilateral knee replacements, right hip replacement    Contributing factors: Immobility -yes.  Activity level: -Sedentary.  Support surfaces: -Waffle cushion to  "chair.  Incontinence: No.  Nutritional state: Well-nourished. Caregiver assistance: . Obesity: Yes. Moisture: Yes      DIABETES: No    TOBACCO USE: She has never smoked or used tobacco products      Interval History:  5/19/2020: Clinic visit with PHYLLIS Marie. Patient states that they are feeling well today.  Patient denies fever, chills, nausea, vomiting, lightheadedness, dizziness, shortness of breath and chest pain.  Patient returns to [facility] for help and assistance in resolution of bilateral buttock ulcers.  Patient brought her Roho cushion she got from the Internet.  Patient advised to use this cushion over waffle cushion as long as she has the cushion to the recommended pressure.            REVIEW OF SYSTEMS:   Review of Systems   Constitutional: Negative for chills and fever.   Respiratory: Negative for cough and shortness of breath.         Dry cough, \"allergies\"  Shortness of breath with exertion   Cardiovascular: Positive for leg swelling. Negative for chest pain and claudication.   Gastrointestinal: Positive for diarrhea. Negative for constipation, nausea and vomiting.        Occasional diarrhea   Genitourinary:        Frequent incontinence of urine  Occasional incontinence of stool  Wears absorptive pad   Musculoskeletal: Positive for back pain and joint pain.        Chronic back and hip pain, debilitating   Psychiatric/Behavioral: Negative for depression. The patient is not nervous/anxious.         On antidepressant, denies wanting to harm her self or others       PHYSICAL EXAMINATION:     Physical Exam   Constitutional: She is oriented to person, place, and time.   Obese elderly female   HENT:   Head: Normocephalic.   Eyes: Pupils are equal, round, and reactive to light.   Pulmonary/Chest: Effort normal.   Musculoskeletal:         General: Edema present.      Comments: Generalized dependent bilateral lower extremity edema  Uses walker for ambulation   Neurological: She is alert and " "oriented to person, place, and time.   Skin: Skin is warm.   Pressure ulcers of bilateral medial buttocks, \"kissing wounds\", both shallow stage III  Refer to wound flowsheet and photos   Psychiatric: Mood, memory, affect and judgment normal.       WOUND ASSESSMENT     Wound 03/13/20 Pressure Injury Buttocks Medial Left L medial buttock pressure ulcer stage 3 (Active)   Wound Image    05/19/20 1020   Site Assessment [place] 05/19/20 1020   Periwound Assessment Dry;Intact;Blanchable erythema 05/19/20 1020   Margins Attached edges 05/19/20 1020   Closure Secondary intention 05/19/20 1020   Drainage Amount KESHA 05/19/20 1020   Drainage Description Serosanguineous 05/11/20 1300   Treatments Cleansed;Topical Lidocaine;Provider debridement 05/19/20 1020   Wound Cleansing Normal Saline Irrigation 05/19/20 1020   Periwound Protectant Skin Protectant Wipes to Periwound 05/19/20 1020   Dressing Cleansing/Solutions Normal Saline 05/19/20 1020   Dressing Options Hydrocolloid Thin 05/19/20 1020   Dressing Changed New 05/19/20 1020   Dressing Status Clean;Dry;Intact 05/19/20 1020   Dressing Change/Treatment Frequency Every 72 hrs, and As Needed 05/19/20 1020   Wound Length (cm) 0.7 cm 05/11/20 1300   Wound Width (cm) 0.8 cm 05/11/20 1300   Wound Depth (cm) 0 cm 05/11/20 1300   Wound Surface Area (cm^2) 0.56 cm^2 05/11/20 1300   Wound Volume (cm^3) 0 cm^3 05/11/20 1300   Post-Procedure Length (cm) 0.1 cm 05/19/20 1020   Post-Procedure Width (cm) 0.3 cm 05/19/20 1020   Post-Procedure Depth (cm) 0.1 cm 05/19/20 1020   Post-Procedure Surface Area (cm^2) 0.03 cm^2 05/19/20 1020   Post-Procedure Volume (cm^3) 0 cm^3 05/19/20 1020   Wound Healing % 100 05/11/20 1300   Wound Bed Granulation (%) 30 % 03/13/20 1300   Wound Bed Slough (%) 70 % 03/13/20 1300   Wound Bed Granulation (%) - Post-Procedure 100 % 05/11/20 1300   Tunneling (cm) 0 cm 05/19/20 1020   Undermining (cm) 0 cm 05/19/20 1020   Wound Odor None 05/19/20 1020   Exposed Structures " None 05/19/20 1020       Wound 03/13/20 Pressure Injury Buttocks Medial Right R medial buttock pressure injury stage 3 (Active)   Wound Image    05/19/20 1020   Site Assessment Brown;Red;Yellow 05/19/20 1020   Periwound Assessment Dry;Blanchable erythema 05/19/20 1020   Margins Attached edges 05/19/20 1020   Closure Secondary intention 05/19/20 1020   Drainage Amount KESHA 05/19/20 1020   Drainage Description Serosanguineous 05/11/20 1300   Treatments Cleansed;Topical Lidocaine;Provider debridement 05/19/20 1020   Wound Cleansing Approved Wound Cleanser 05/19/20 1020   Periwound Protectant Skin Protectant Wipes to Periwound 05/19/20 1020   Dressing Cleansing/Solutions Normal Saline 05/19/20 1020   Dressing Options Hydrocolloid Thin 05/19/20 1020   Dressing Changed New 05/19/20 1020   Dressing Status Clean;Dry;Intact 05/19/20 1020   Dressing Change/Treatment Frequency Every 72 hrs, and As Needed 05/19/20 1020   Wound Length (cm) 0.3 cm 05/19/20 1020   Wound Width (cm) 1 cm 05/19/20 1020   Wound Depth (cm) 0.2 cm 05/11/20 1300   Wound Surface Area (cm^2) 0.3 cm^2 05/19/20 1020   Wound Volume (cm^3) 0.18 cm^3 05/11/20 1300   Post-Procedure Length (cm) 0.2 cm 05/19/20 1020   Post-Procedure Width (cm) 1.2 cm 05/19/20 1020   Post-Procedure Depth (cm) 0.1 cm 05/19/20 1020   Post-Procedure Surface Area (cm^2) 0.24 cm^2 05/19/20 1020   Post-Procedure Volume (cm^3) 0.02 cm^3 05/19/20 1020   Wound Healing % -80 05/11/20 1300   Wound Bed Granulation (%) 50 % 05/11/20 1300   Wound Bed Slough (%) 50 % 05/11/20 1300   Wound Bed Granulation (%) - Post-Procedure 100 % 03/13/20 1300   Tunneling (cm) 0 cm 05/19/20 1020   Undermining (cm) 0 cm 05/19/20 1020   Wound Odor None 05/19/20 1020   Exposed Structures None 05/19/20 1020               PROCEDURE: Excisional debridement of bilateral buttock ulcers  -2% viscous lidocaine applied topically to wound beds for approximately 5 minutes prior to debridement  -Forceps used to debride wound  beds.  Excisional debridement was performed to remove devitalized tissue until healthy, bleeding tissue was visualized.   Entire surface of wounds, 0.27 cm2, debrided  Tissue debrided into the subcutaneous layer of both wounds.    -Pain controlled with manual pressure   -Wound care completed by wound RN            PATIENT EDUCATION  -Etiology of pressure injury  -Importance of offloading  -Strategies for offloading in bed and when seated discussed and demonstrated  - Importance of adequate nutrition for wound healing  -Increase protein intake (unless contraindicated by renal status)   - Advised to go to ER for any increased redness, swelling, drainage or odor, or if patient develops fever, chills, nausea or vomiting.    ASSESSMENT AND PLAN:     1. Pressure injury of buttock, stage 3, unspecified laterality (HCC)  Comments: Shallow stage III ulcers to bilateral medial buttocks, mirroring each other, kissing wounds.  Left buttock wound was initially noted to be closed, but has reopened    05/19/20  Patient returns to clinic for evaluation and treatment  -Excisional debridement of wound in clinic today, medically necessary to promote wound healing.  -Patient to return to clinic weekly for assessment and debridement  -Patient's  to change dressing 1-2 times per week in between clinic visits  -Offloading strategies discussed and demonstrated.  Patient to continue with the Roho cushion she purchased off of [employer].  Patient is to maintain the instructed appropriate pressure through the manufacture.  Patient reports she uses towels placed under one side of her thigh and buttocks and rotates at least every hour.    Wound care: Hydrocolloid thin    2. Age-related physical debility  Comments: Patient does not ambulate much, spends most of her day sitting due to physical weakness and chronic back pain.  Is scheduled for back surgery sometime in the next few months.  05/19/20: Comments: Patient encouraged to mobilize as  much as possible to increase physical strength as well as to relieve pressure on bilateral buttock wounds.    3. Obesity due to excess calories, unspecified classification, unspecified whether serious comorbidity present  Comments: Complicating factor.  Impaired wound healing potential.  Impaired mobility      Patient was seen for 15 minutes face to face of which > 50% of appointment time was spent on counseling and coordination of care regarding the above.      Please note that this dictation was created using voice recognition software. I have worked with technical experts from [facility] to optimize the interface.  I have made every reasonable attempt to correct obvious errors, but there may be errors of grammar and possibly content that I did not discover before finalizing the note.

## 2020-05-19 NOTE — PATIENT INSTRUCTIONS
-Keep dressings clean, dry and covered while bathing. Change dressings if they become over saturated, soiled or fall off; or once in between clinic visits.     -Avoid prolonged sitting and rotate weight on buttocks offloading wounds.    -Never walk around the house barefoot. Always wear a rubber soled slipper when walking around the house.    -Should you experience any significant changes in your wound(s), such as infection (redness, swelling, localized heat, increased pain, fever > 101 F, chills) or have any questions regarding your home care instructions, please contact the wound center at [phone]. If after hours, contact your primary care physician or go to the hospital emergency room.

## 2020-05-21 ENCOUNTER — ANTICOAGULATION MONITORING (OUTPATIENT)
Dept: VASCULAR LAB | Facility: MEDICAL CENTER | Age: 80
End: 2020-05-21

## 2020-05-21 DIAGNOSIS — Z79.01 CHRONIC ANTICOAGULATION: ICD-10-CM

## 2020-05-21 LAB — INR PPP: 2.7 (ref 2–3.5)

## 2020-05-21 NOTE — PROGRESS NOTES
Anticoagulation Summary  As of 2020    INR goal:   2.0-3.0   TTR:   72.5 % (4.8 y)   INR used for dosin.70 (2020)   Warfarin maintenance plan:   3.75 mg (2.5 mg x 1.5) every Fri; 2.5 mg (2.5 mg x 1) all other days   Weekly warfarin total:   18.75 mg   No change documented:   Linus Raphael Ass't   Plan last modified:   Vladimir Taylor PharmD (3/18/2020)   Next INR check:   2020   Target end date:   Indefinite    Indications    Atrial fibrillation (HCC) (Resolved) [I48.91]  Chronic anticoagulation [Z79.01]             Anticoagulation Episode Summary     INR check location:   Home Draw    Preferred lab:       Send INR reminders to:       Comments:   Winston YEAGER      Anticoagulation Care Providers     Provider Role Specialty Phone number    Lizzy Haywood M.D. Referring Cardiology [phone]    [facility] Responsible  [phone]    Vladimir Taylor, PharmD Responsible          Anticoagulation Patient Findings  Patient Findings     Negatives:   Signs/symptoms of thrombosis, Signs/symptoms of bleeding, Laboratory test error suspected, Change in health, Change in alcohol use, Change in activity, Upcoming invasive procedure, Emergency department visit, Upcoming dental procedure, Missed doses, Extra doses, Change in medications, Change in diet/appetite, Hospital admission, Bruising, Other complaints      Spoke with patient to report a therapeutic INR.  Pt instructed to continue with current warfarin dosing regimen. Pt denies any s/s of bleeding, bruising, clotting or any changes to diet or medication.  Will follow up in 2 weeks.  Linus Raphael Ass't     I have reviewed and concur with the above plan     Fran Leyva, PeterD

## 2020-05-26 ENCOUNTER — OFFICE VISIT (OUTPATIENT)
Dept: WOUND CARE | Facility: MEDICAL CENTER | Age: 80
End: 2020-05-26
Attending: NURSE PRACTITIONER
Payer: MEDICARE

## 2020-05-26 NOTE — PROGRESS NOTES
Patient arrived for appointment past 6 minute cut off time. Unable to see patient. Rescheduled via PARs

## 2020-05-27 DIAGNOSIS — I25.10 CORONARY ARTERY DISEASE INVOLVING NATIVE CORONARY ARTERY OF NATIVE HEART WITHOUT ANGINA PECTORIS: ICD-10-CM

## 2020-05-27 DIAGNOSIS — E03.9 ACQUIRED HYPOTHYROIDISM: ICD-10-CM

## 2020-05-27 DIAGNOSIS — I48.19 PERSISTENT ATRIAL FIBRILLATION (HCC): ICD-10-CM

## 2020-05-27 DIAGNOSIS — N18.30 STAGE 3 CHRONIC KIDNEY DISEASE: ICD-10-CM

## 2020-05-27 DIAGNOSIS — I10 ESSENTIAL HYPERTENSION: ICD-10-CM

## 2020-05-27 DIAGNOSIS — Z95.0 CARDIAC PACEMAKER IN SITU: ICD-10-CM

## 2020-06-01 ENCOUNTER — TELEPHONE (OUTPATIENT)
Dept: VASCULAR LAB | Facility: MEDICAL CENTER | Age: 80
End: 2020-06-01

## 2020-06-02 ENCOUNTER — HOSPITAL ENCOUNTER (OUTPATIENT)
Dept: LAB | Facility: MEDICAL CENTER | Age: 80
End: 2020-06-02
Attending: NURSE PRACTITIONER
Payer: MEDICARE

## 2020-06-02 ENCOUNTER — TELEPHONE (OUTPATIENT)
Dept: MEDICAL GROUP | Facility: PHYSICIAN GROUP | Age: 80
End: 2020-06-02

## 2020-06-02 ENCOUNTER — OFFICE VISIT (OUTPATIENT)
Dept: WOUND CARE | Facility: MEDICAL CENTER | Age: 80
End: 2020-06-02
Attending: NURSE PRACTITIONER
Payer: MEDICARE

## 2020-06-02 VITALS
TEMPERATURE: 97.8 F | OXYGEN SATURATION: 96 % | SYSTOLIC BLOOD PRESSURE: 128 MMHG | HEART RATE: 94 BPM | DIASTOLIC BLOOD PRESSURE: 61 MMHG | RESPIRATION RATE: 16 BRPM

## 2020-06-02 DIAGNOSIS — I25.10 CORONARY ARTERY DISEASE INVOLVING NATIVE CORONARY ARTERY OF NATIVE HEART WITHOUT ANGINA PECTORIS: ICD-10-CM

## 2020-06-02 DIAGNOSIS — Z95.0 CARDIAC PACEMAKER IN SITU: ICD-10-CM

## 2020-06-02 DIAGNOSIS — E03.9 ACQUIRED HYPOTHYROIDISM: ICD-10-CM

## 2020-06-02 DIAGNOSIS — I48.19 PERSISTENT ATRIAL FIBRILLATION (HCC): ICD-10-CM

## 2020-06-02 LAB
BASOPHILS # BLD AUTO: 0.8 % (ref 0–1.8)
BASOPHILS # BLD: 0.1 K/UL (ref 0–0.12)
EOSINOPHIL # BLD AUTO: 0.18 K/UL (ref 0–0.51)
EOSINOPHIL NFR BLD: 1.5 % (ref 0–6.9)
ERYTHROCYTE [DISTWIDTH] IN BLOOD BY AUTOMATED COUNT: 49.6 FL (ref 35.9–50)
HCT VFR BLD AUTO: 48.1 % (ref 37–47)
HGB BLD-MCNC: 15.3 G/DL (ref 12–16)
IMM GRANULOCYTES # BLD AUTO: 0.07 K/UL (ref 0–0.11)
IMM GRANULOCYTES NFR BLD AUTO: 0.6 % (ref 0–0.9)
LYMPHOCYTES # BLD AUTO: 3.28 K/UL (ref 1–4.8)
LYMPHOCYTES NFR BLD: 27.7 % (ref 22–41)
MCH RBC QN AUTO: 32.2 PG (ref 27–33)
MCHC RBC AUTO-ENTMCNC: 31.8 G/DL (ref 33.6–35)
MCV RBC AUTO: 101.3 FL (ref 81.4–97.8)
MONOCYTES # BLD AUTO: 0.79 K/UL (ref 0–0.85)
MONOCYTES NFR BLD AUTO: 6.7 % (ref 0–13.4)
NEUTROPHILS # BLD AUTO: 7.4 K/UL (ref 2–7.15)
NEUTROPHILS NFR BLD: 62.7 % (ref 44–72)
NRBC # BLD AUTO: 0 K/UL
NRBC BLD-RTO: 0 /100 WBC
PLATELET # BLD AUTO: 266 K/UL (ref 164–446)
PMV BLD AUTO: 10.8 FL (ref 9–12.9)
RBC # BLD AUTO: 4.75 M/UL (ref 4.2–5.4)
WBC # BLD AUTO: 11.8 K/UL (ref 4.8–10.8)

## 2020-06-02 PROCEDURE — 36415 COLL VENOUS BLD VENIPUNCTURE: CPT

## 2020-06-02 PROCEDURE — 84443 ASSAY THYROID STIM HORMONE: CPT

## 2020-06-02 PROCEDURE — 85025 COMPLETE CBC W/AUTO DIFF WBC: CPT

## 2020-06-02 PROCEDURE — 11042 DBRDMT SUBQ TIS 1ST 20SQCM/<: CPT

## 2020-06-02 PROCEDURE — 80053 COMPREHEN METABOLIC PANEL: CPT

## 2020-06-02 PROCEDURE — 80061 LIPID PANEL: CPT

## 2020-06-02 PROCEDURE — 99213 OFFICE O/P EST LOW 20 MIN: CPT | Performed by: NURSE PRACTITIONER

## 2020-06-02 PROCEDURE — 99213 OFFICE O/P EST LOW 20 MIN: CPT

## 2020-06-02 ASSESSMENT — ENCOUNTER SYMPTOMS
DEPRESSION: 0
COUGH: 0
FEVER: 0
DIARRHEA: 0
BACK PAIN: 1
CHILLS: 0
SHORTNESS OF BREATH: 0
NAUSEA: 0
VOMITING: 0
NERVOUS/ANXIOUS: 0
CLAUDICATION: 0
CONSTIPATION: 0

## 2020-06-02 NOTE — TELEPHONE ENCOUNTER
Future Appointments       Provider Department Center    6/2/2020 10:00 AM [facility] [address]    6/2/2020 12:45 PM LAB [facility]     6/4/2020 10:20 AM [facility]    6/9/2020 10:00 AM [facility] [address]    11/4/2020 9:30 AM PACER CHECK-[facility]     11/4/2020 9:45 AM Kishore Richards M.D. [facility]         ESTABLISHED PATIENT PRE-VISIT PLANNING     Patient was NOT contacted to complete PVP.    1.  Reviewed notes from the last few office visits within the medical group: Yes    2.  If any orders were placed at last visit or intended to be done for this visit (i.e. 6 mos follow-up), do we have Results/Consult Notes?        •  Labs - Labs were not ordered at last office visit.       •  Imaging - Imaging was not ordered at last office visit.       •  Referrals - No referrals were ordered at last office visit.    3. Is this appointment scheduled as a Hospital Follow-Up? No    4.  Immunizations were updated in Epic using WebIZ?: Yes       •  Web Iz Recommendations: PNEUMOVAX (PPSV23), TD and SHINGRIX (Shingles)    5.  Patient is due for the following Health Maintenance Topics:   Health Maintenance Due   Topic Date Due   • Annual Wellness Visit  12/06/2019       6. Orders for overdue Health Maintenance topics pended in Pre-Charting? N\A    7.  AHA (MDX) form printed for Provider? YES    8.  Patient was NOT informed to arrive 15 min prior to their scheduled appointment and bring in their medication bottles.

## 2020-06-02 NOTE — PATIENT INSTRUCTIONS
-Keep dressings clean, dry and covered while bathing. Change dressings if they become over saturated, soiled or fall off; or once in between clinic visits.     -Avoid prolonged  sitting without rotating off of buttocks.    -Never walk around the house barefoot. Always wear a rubber soled slipper when walking around the house.    -Should you experience any significant changes in your wound(s), such as infection (redness, swelling, localized heat, increased pain, fever > 101 F, chills) or have any questions regarding your home care instructions, please contact the wound center at [phone]. If after hours, contact your primary care physician or go to the hospital emergency room.

## 2020-06-02 NOTE — PROGRESS NOTES
Provider Encounter- Pressure Injury    HISTORY OF PRESENT ILLNESS                              START OF CARE IN CLINIC: 3/13/2020               REFERRING PROVIDER: Kishore Price MD                 WOUND-pressure ulcer              LOCATION: Shallow stage III, medial right buttock wound                                  Shallow stage III, left buttock, mirroring each right buttock wound (kissing  wounds)              WOUND HISTORY: Patient is an 78 year old female who has had wounds to her buttocks off and on since December 2018. She is unsure of how the wounds started, but does state that she has a bad back and is in her recliner chair day and night.  She does get up several times per day to go to the bathroom, but otherwise does not ambulate much.  Her  brings her food, does all the cooking, cleaning and shopping.  She was seen previously for these wounds in the clinic, discharged in December 2019 due to healing.  She now has a Roho cushion for offloading for her chair.    5/19/2020 patient was seen by PHYLLIS Ivory (who later through my chart) referred the patient back to the wound clinic after a break due to fear of Covid-19 exposure.  Mrs. Magaña reports that she in fact does have incontinence issues.  She reports it is hard to keep her buttocks dry.  He has been applying Gold Bond with lidocaine to bilateral buttocks wounds.  The wounds today 5/19/2020 are superficial.  Patient reports that she has been offloading frequently and shifting left to right with towels, obviously she has been doing an adequate job since the wounds are superficial and near resolution at this time.  Patient returns to [facility] for treatment of right and left buttock ulcers.                  PERTINENT PMH: Obesity, limited mobility, chronic back pain, CAD, bilateral knee replacements, right hip replacement    Contributing factors: Immobility -yes.  Activity level: -Sedentary.  Support surfaces: -Waffle cushion to  chair.  Incontinence: No.  Nutritional state: Well-nourished. Caregiver assistance: . Obesity: Yes. Moisture: Yes      DIABETES: No    TOBACCO USE: She has never smoked or used tobacco products      Interval History:  5/19/2020: Clinic visit with PHYLLIS Marie. Patient states that they are feeling well today.  Patient denies fever, chills, nausea, vomiting, lightheadedness, dizziness, shortness of breath and chest pain.  Patient returns to [facility] for help and assistance in resolution of bilateral buttock ulcers.  Patient brought her Roho cushion she got from the Internet.  Patient advised to use this cushion over waffle cushion as long as she has the cushion to the recommended pressure.    6/2/2020: Clinic visit with PHYLLIS Marie. Patient states that they are feeling well today.  Patient denies fever, chills, nausea, vomiting, lightheadedness, dizziness, shortness of breath and chest pain.  Patient seemed like she had some confusion today related to chronic incontinence versus urinary tract infection.  Patient resented with out hydrocolloid dressing to bilateral buttock wounds.  Patient was nonspecific into her reason why she did not continue with her wound dressing.  Patient has also not been compliant with offloading of her buttock wounds.  Reiterated to the patient that she needs to keep the hydrocolloid dressing on we have given her enough supplies to get her through to next clinic visit.  Furthermore, she needs to reposition herself every 30 minutes to an hour to reduce pressure and allow the wounds to heal.  Patient does have a Roho cushion that she uses in her recliner.  Patient is fasting today for blood work patient to see her PCP Latasha Mendoza for follow-up.            REVIEW OF SYSTEMS:   Review of Systems   Constitutional: Negative for chills and fever.   Respiratory: Negative for cough and shortness of breath.         Shortness of breath with exertion   Cardiovascular:  "Positive for leg swelling. Negative for chest pain and claudication.   Gastrointestinal: Negative for constipation, diarrhea, nausea and vomiting.   Genitourinary:        Frequent incontinence of urine  Occasional incontinence of stool  Wears absorptive pad   Musculoskeletal: Positive for back pain and joint pain.        Chronic back and hip pain, debilitating   Psychiatric/Behavioral: Negative for depression. The patient is not nervous/anxious.         On antidepressant, denies wanting to harm her self or others       PHYSICAL EXAMINATION:     Physical Exam   Constitutional: She is oriented to person, place, and time.   Obese elderly female   HENT:   Head: Normocephalic.   Eyes: Pupils are equal, round, and reactive to light.   Pulmonary/Chest: Effort normal.   Musculoskeletal:         General: Edema present.      Comments: Generalized dependent bilateral lower extremity edema  Uses walker for ambulation   Neurological: She is alert and oriented to person, place, and time.   Skin: Skin is warm.   Pressure ulcers of bilateral medial buttocks, \"kissing wounds\", both shallow stage III  Refer to wound flowsheet and photos   Psychiatric: Mood, memory, affect and judgment normal.       WOUND ASSESSMENT     Wound 03/13/20 Pressure Injury Buttocks Medial Left L medial buttock pressure ulcer stage 3 (Active)   Wound Image    06/02/20 1000   Site Assessment Pink;Yellow;Other (Comment) 06/02/20 1000   Periwound Assessment Dry;Intact;Blanchable erythema 06/02/20 1000   Margins Attached edges 06/02/20 1000   Closure Secondary intention 06/02/20 1000   Drainage Amount KESHA 06/02/20 1000   Drainage Description Serosanguineous 05/11/20 1300   Treatments Cleansed;Provider debridement 06/02/20 1000   Wound Cleansing Normal Saline Irrigation 06/02/20 1000   Periwound Protectant Skin Protectant Wipes to Periwound 06/02/20 1000   Dressing Cleansing/Solutions Normal Saline 06/02/20 1000   Dressing Options Hydrocolloid Thin 06/02/20 1000 "   Dressing Changed Changed 06/02/20 1000   Dressing Status Clean;Dry;Intact 06/02/20 1000   Dressing Change/Treatment Frequency Every 72 hrs, and As Needed 06/02/20 1000   Wound Length (cm) 0.7 cm 05/11/20 1300   Wound Width (cm) 0.8 cm 05/11/20 1300   Wound Depth (cm) 0 cm 05/11/20 1300   Wound Surface Area (cm^2) 0.56 cm^2 05/11/20 1300   Wound Volume (cm^3) 0 cm^3 05/11/20 1300   Post-Procedure Length (cm) 1 cm 06/02/20 1000   Post-Procedure Width (cm) 0.4 cm 06/02/20 1000   Post-Procedure Depth (cm) 0 cm 06/02/20 1000   Post-Procedure Surface Area (cm^2) 0.4 cm^2 06/02/20 1000   Post-Procedure Volume (cm^3) 0 cm^3 06/02/20 1000   Wound Healing % 100 05/11/20 1300   Wound Bed Granulation (%) 30 % 03/13/20 1300   Wound Bed Slough (%) 70 % 03/13/20 1300   Wound Bed Granulation (%) - Post-Procedure 100 % 05/11/20 1300   Tunneling (cm) 0 cm 06/02/20 1000   Undermining (cm) 0 cm 06/02/20 1000   Wound Odor None 06/02/20 1000   Exposed Structures None 06/02/20 1000       Wound 03/13/20 Pressure Injury Buttocks Medial Right R medial buttock pressure injury stage 3 (Active)   Wound Image    06/02/20 1000   Site Assessment Yellow;Pink;Other (Comment) 06/02/20 1000   Periwound Assessment Dry;Blanchable erythema 06/02/20 1000   Margins Attached edges 06/02/20 1000   Closure Secondary intention 06/02/20 1000   Drainage Amount KESHA 06/02/20 1000   Drainage Description Serosanguineous 05/11/20 1300   Treatments Cleansed;Provider debridement 06/02/20 1000   Wound Cleansing Normal Saline Irrigation 06/02/20 1000   Periwound Protectant Skin Protectant Wipes to Periwound 06/02/20 1000   Dressing Cleansing/Solutions Normal Saline 06/02/20 1000   Dressing Options Hydrocolloid Thin 06/02/20 1000   Dressing Changed Changed 06/02/20 1000   Dressing Status Clean;Dry;Intact 06/02/20 1000   Dressing Change/Treatment Frequency Every 72 hrs, and As Needed 06/02/20 1000   Wound Length (cm) 0.3 cm 05/19/20 1020   Wound Width (cm) 1 cm 05/19/20  1020   Wound Depth (cm) 0.2 cm 05/11/20 1300   Wound Surface Area (cm^2) 0.3 cm^2 05/19/20 1020   Wound Volume (cm^3) 0.18 cm^3 05/11/20 1300   Post-Procedure Length (cm) 0.3 cm 06/02/20 1000   Post-Procedure Width (cm) 1 cm 06/02/20 1000   Post-Procedure Depth (cm) 0 cm 06/02/20 1000   Post-Procedure Surface Area (cm^2) 0.3 cm^2 06/02/20 1000   Post-Procedure Volume (cm^3) 0 cm^3 06/02/20 1000   Wound Healing % -80 05/11/20 1300   Wound Bed Granulation (%) 50 % 05/11/20 1300   Wound Bed Slough (%) 50 % 05/11/20 1300   Wound Bed Granulation (%) - Post-Procedure 100 % 03/13/20 1300   Tunneling (cm) 0 cm 06/02/20 1000   Undermining (cm) 0 cm 06/02/20 1000   Wound Odor None 06/02/20 1000   Exposed Structures None 06/02/20 1000                      PROCEDURE: Excisional debridement of bilateral buttock ulcers not necessary in clinic today.  Replaced hydrocolloid thin.  Patient given instructions to give to her  on how to replace dressing and appropriate timing of dressing change.  Patient given thorough instructions on repositioning offloading strategies.          PATIENT EDUCATION  -Etiology of pressure injury  -Importance of offloading  -Strategies for offloading in bed and when seated discussed and demonstrated  - Importance of adequate nutrition for wound healing  -Increase protein intake (unless contraindicated by renal status)   - Advised to go to ER for any increased redness, swelling, drainage or odor, or if patient develops fever, chills, nausea or vomiting.    ASSESSMENT AND PLAN:     1. Pressure injury of buttock, stage 3, unspecified laterality (HCC)  Comments: Shallow stage III ulcers to bilateral medial buttocks, mirroring each other, kissing wounds.  Left buttock wound was initially noted to be closed, but has reopened    06/02/20 comments: Patient has been noncompliant with instructions given by this provider.  Patient did not have any dressing on when she presented to clinic today.  Patient  "instructed to keep her hydrocolloid dressing on, instructions written out for patient to give to her  to help with wound dressing change.  -Excisional debridement of wound in clinic today not necessary.  -Patient to return to clinic weekly for assessment and debridement  -Patient's  to change dressing 1-2 times per week in between clinic visits  -Offloading strategies were again discussed with the patient.  Patient to adjust position every 30 minutes to an hour with use of pillow under one side of her buttocks and to shift the pillow to the other side. Donte has a Roho cushion that she uses in her recliner to assist with offloading.  Patient reports \"I have not been adjusting my position as often as I should unless I have to get up to go the bathroom\".    Wound care: Hydrocolloid thin    2. Age-related physical debility  Comments: Patient does not ambulate much, spends most of her day sitting due to physical weakness and chronic back pain.   06/02/20: Comments: Patient encouraged to mobilize as much as possible to increase physical strength as well as to relieve pressure on bilateral buttock wounds.    3. Obesity due to excess calories, unspecified classification, unspecified whether serious comorbidity present  Comments: Complicating factor.  Impaired wound healing potential.  Impaired mobility      Patient was seen for 15 minutes face to face of which > 50% of appointment time was spent on counseling and coordination of care regarding the above.      Please note that this dictation was created using voice recognition software. I have worked with technical experts from [facility] to optimize the interface.  I have made every reasonable attempt to correct obvious errors, but there may be errors of grammar and possibly content that I did not discover before finalizing the note.  "

## 2020-06-03 LAB
ALBUMIN SERPL BCP-MCNC: 4.1 G/DL (ref 3.2–4.9)
ALBUMIN/GLOB SERPL: 1.3 G/DL
ALP SERPL-CCNC: 59 U/L (ref 30–99)
ALT SERPL-CCNC: 12 U/L (ref 2–50)
ANION GAP SERPL CALC-SCNC: 13 MMOL/L (ref 7–16)
AST SERPL-CCNC: 15 U/L (ref 12–45)
BILIRUB SERPL-MCNC: 0.5 MG/DL (ref 0.1–1.5)
BUN SERPL-MCNC: 26 MG/DL (ref 8–22)
CALCIUM SERPL-MCNC: 9.4 MG/DL (ref 8.5–10.5)
CHLORIDE SERPL-SCNC: 101 MMOL/L (ref 96–112)
CHOLEST SERPL-MCNC: 166 MG/DL (ref 100–199)
CO2 SERPL-SCNC: 22 MMOL/L (ref 20–33)
CREAT SERPL-MCNC: 0.97 MG/DL (ref 0.5–1.4)
FASTING STATUS PATIENT QL REPORTED: NORMAL
GLOBULIN SER CALC-MCNC: 3.2 G/DL (ref 1.9–3.5)
GLUCOSE SERPL-MCNC: 108 MG/DL (ref 65–99)
HDLC SERPL-MCNC: 46 MG/DL
LDLC SERPL CALC-MCNC: 83 MG/DL
POTASSIUM SERPL-SCNC: 5.3 MMOL/L (ref 3.6–5.5)
PROT SERPL-MCNC: 7.3 G/DL (ref 6–8.2)
SODIUM SERPL-SCNC: 136 MMOL/L (ref 135–145)
TRIGL SERPL-MCNC: 183 MG/DL (ref 0–149)
TSH SERPL DL<=0.005 MIU/L-ACNC: 0.92 UIU/ML (ref 0.38–5.33)

## 2020-06-04 ENCOUNTER — OFFICE VISIT (OUTPATIENT)
Dept: MEDICAL GROUP | Facility: PHYSICIAN GROUP | Age: 80
End: 2020-06-04
Payer: MEDICARE

## 2020-06-04 ENCOUNTER — ANTICOAGULATION MONITORING (OUTPATIENT)
Dept: MEDICAL GROUP | Facility: PHYSICIAN GROUP | Age: 80
End: 2020-06-04

## 2020-06-04 VITALS
BODY MASS INDEX: 31.07 KG/M2 | TEMPERATURE: 97.5 F | SYSTOLIC BLOOD PRESSURE: 110 MMHG | OXYGEN SATURATION: 96 % | HEIGHT: 64 IN | WEIGHT: 182 LBS | DIASTOLIC BLOOD PRESSURE: 62 MMHG | HEART RATE: 88 BPM

## 2020-06-04 DIAGNOSIS — Z79.01 CHRONIC ANTICOAGULATION: ICD-10-CM

## 2020-06-04 DIAGNOSIS — N18.30 STAGE 3 CHRONIC KIDNEY DISEASE: ICD-10-CM

## 2020-06-04 DIAGNOSIS — L89.302 PRESSURE INJURY OF BUTTOCK, STAGE 2, UNSPECIFIED LATERALITY (HCC): ICD-10-CM

## 2020-06-04 DIAGNOSIS — E03.9 ACQUIRED HYPOTHYROIDISM: ICD-10-CM

## 2020-06-04 LAB — INR PPP: 2.6 (ref 2–3.5)

## 2020-06-04 PROCEDURE — 99214 OFFICE O/P EST MOD 30 MIN: CPT | Performed by: NURSE PRACTITIONER

## 2020-06-04 ASSESSMENT — FIBROSIS 4 INDEX: FIB4 SCORE: 1.32

## 2020-06-04 NOTE — PROGRESS NOTES
OP   Telephone Anticoagulation Service Note      Anticoagulation Summary  As of 2020    INR goal:   2.0-3.0   TTR:   72.7 % (4.9 y)   INR used for dosin.60 (2020)   Warfarin maintenance plan:   3.75 mg (2.5 mg x 1.5) every Fri; 2.5 mg (2.5 mg x 1) all other days   Weekly warfarin total:   18.75 mg   No change documented:   Taty Damon   Plan last modified:   Vladimir Taylor PharmD (3/18/2020)   Next INR check:   2020   Target end date:   Indefinite    Indications    Atrial fibrillation (HCC) (Resolved) [I48.91]  Chronic anticoagulation [Z79.01]             Anticoagulation Episode Summary     INR check location:   Home Draw    Preferred lab:       Send INR reminders to:       Comments:   Winston YEAGER      Anticoagulation Care Providers     Provider Role Specialty Phone number    Lizzy Haywood M.D. Referring Cardiology [phone]    [facility] Responsible  [phone]    Vladimir Taylor, Tim Responsible          Anticoagulation Patient Findings    Left a message on the patient's voicemail today, informing the patient of a therapeutic INR of 2.6. Instructed the patient to call the clinic with any changes to diet or medications, with any signs/sx of bleeding or clotting or with any questions or concerns.     Patient instructed to continue with the current warfarin dosing regimen, and asked to follow up again in 2 weeks.     Rayray GreenD

## 2020-06-04 NOTE — ASSESSMENT & PLAN NOTE
Chronic medical problem. She is followed by wound care. Her next appointment is next week. Her  is helping with dressing changes every 2-3 days or when soiled.  reports that it has decreased in size to about the size of a sherrie. Denies fever, chills, drainage, or redness. It is painful when pressing on. She has been changing her positions every 2 hours.

## 2020-06-04 NOTE — ASSESSMENT & PLAN NOTE
Chronic medical problem.  Patient had recent lab work and is here to review results.  Last lab results:  Results for STEPHANIE DAVENPORT (MRN [id number]) as of 6/4/2020 11:04   Ref. Range 1/10/2020 14:23 6/2/2020 11:56   Bun Latest Ref Range: 8 - 22 mg/dL 29 (H) 26 (H)   Creatinine Latest Ref Range: 0.50 - 1.40 mg/dL 1.11 0.97   GFR If  Latest Ref Range: >60 mL/min/1.73 m 2 57 (A) >60   GFR If Non  Latest Ref Range: >60 mL/min/1.73 m 2 47 (A) 55 (A)

## 2020-06-04 NOTE — ASSESSMENT & PLAN NOTE
Chronic medical problem.  Patient is on levothyroxine 50 mcg daily.  She did have recent lab work and would like to review her labs.  Denies any temperature intolerances, weight gain or weight loss, dry skin, fatigue, or hair loss.  Last lab results:  Results for STEPHANIE DAVENPORT (MRN [id number]) as of 6/4/2020 11:04   Ref. Range 6/2/2020 11:56   TSH Latest Ref Range: 0.380 - 5.330 uIU/mL 0.916

## 2020-06-04 NOTE — PROGRESS NOTES
"Subjective:     CC: Wound check.    HPI:   Donte presents today with:    Pressure injury of buttock, stage 2 (HCC)  Chronic medical problem. She is followed by wound care. Her next appointment is next week. Her  is helping with dressing changes every 2-3 days or when soiled.  reports that it has decreased in size to about the size of a sherrie. Denies fever, chills, drainage, or redness. It is painful when pressing on. She has been changing her positions every 2 hours.     Stage 3 chronic kidney disease (HCC)  Chronic medical problem.  Patient had recent lab work and is here to review results.  Last lab results:  Results for DONTE DAVENPORT (MRN [id number]) as of 6/4/2020 11:04   Ref. Range 1/10/2020 14:23 6/2/2020 11:56   Bun Latest Ref Range: 8 - 22 mg/dL 29 (H) 26 (H)   Creatinine Latest Ref Range: 0.50 - 1.40 mg/dL 1.11 0.97   GFR If  Latest Ref Range: >60 mL/min/1.73 m 2 57 (A) >60   GFR If Non  Latest Ref Range: >60 mL/min/1.73 m 2 47 (A) 55 (A)       Hypothyroidism  Chronic medical problem.  Patient is on levothyroxine 50 mcg daily.  She did have recent lab work and would like to review her labs.  Denies any temperature intolerances, weight gain or weight loss, dry skin, fatigue, or hair loss.  Last lab results:  Results for DONTE DAVENPORT (MRN [id number]) as of 6/4/2020 11:04   Ref. Range 6/2/2020 11:56   TSH Latest Ref Range: 0.380 - 5.330 uIU/mL 0.916       Past Medical History:   Diagnosis Date   • A-fib (HCC)    • Anesthesia     \"Tachycardia for 5 days after cataract surgery\"   • Anticoagulant long-term use 1/12/2012   • Arthritis     osteo-Knees, hips   • Atrial fibrillation (HCC)    • Backpain     R hip   • Bowel habit changes     diarrhea   • Breath shortness     with exertion, prn O2 2L   • Bronchitis Nov, 2013   • CAD (coronary artery disease)    • Depression    • Glaucoma 5/3/2011   • Hematoma complicating a procedure 11/3/2012   • Hemorrhagic disorder (HCC) "     bruising/coumadin   • Hypertension    • Hypothyroid    • Lupus (HCC)    • Macular degeneration    • Menopause 1/12/2012   • Mitral regurgitation 10/30/2012   • Obesity 1/12/2012   • Pacemaker 2018   • Pneumonia feb,2013   • Pre-syncope 6/29/2018   • Pulmonary hypertension (HCC) 10/30/2012   • PVC's (premature ventricular contractions) 1/12/2012   • Senile nuclear sclerosis    • Spinal stenosis of lumbar region at multiple levels    • Unspecified cataract     repaired bilateral   • Unspecified urinary incontinence    • Urinary bladder disorder        Social History     Tobacco Use   • Smoking status: Never Smoker   • Smokeless tobacco: Never Used   Substance Use Topics   • Alcohol use: No   • Drug use: No       Current Outpatient Medications Ordered in Epic   Medication Sig Dispense Refill   • levothyroxine (SYNTHROID) 50 MCG Tab TAKE 1 TABLET BY MOUTH EVERY MORNING ON A EMPTY STOMACH 90 Tab 1   • atenolol (TENORMIN) 50 MG Tab Take 1 Tab by mouth 2 times a day. 200 Tab 3   • warfarin (COUMADIN) 2.5 MG Tab TAKE 1-1.5 TABS BY MOUTH EVERY DAY. AS DIRECTED BY THE [facility] 135 Tab 1   • nystatin (MYCOSTATIN) powder APPLY UP TO 4 TIMES A DAY TO GROIN RASH AFTER WASHING WITH MILD SOAP AND WATER 30 g 0   • oxybutynin SR (DITROPAN-XL) 5 MG TABLET SR 24 HR Take 5 mg by mouth every day.     • estradiol (ESTRACE) 2 MG Tab TAKE ONE TABLET BY MOUTH EVERY DAY 90 Tab 2   • spironolactone (ALDACTONE) 50 MG Tab TAKE ONE TABLET BY MOUTH EVERY MORNING AND EVERY EVENING 180 Tab 2   • sertraline (ZOLOFT) 50 MG Tab Take 1 Tab by mouth every day. 90 Tab 3   • Triamcinolone Acetonide 0.025 % Lotion 1 Squirt by Apply externally route 2 Times a Day. Apply to itching ears. 1 Bottle 1   • Mirabegron ER (MYRBETRIQ) 50 MG TABLET SR 24 HR Take 50 mg by mouth every day. 90 Tab 3   • ipratropium (ATROVENT) 0.03 % Solution SPRAY 2 SPRAYS IN NOSE EVERY 12 HOURS. 30 mL 3   • lisinopril (PRINIVIL) 5 MG Tab Take 1 Tab by mouth  "every day. 100 Tab 1   • Acetaminophen 500 MG Cap Take 2 Caps by mouth 3 times a day.     • PREBIOTIC PRODUCT PO Take 2 Tabs by mouth every day.     • Lutein 20 MG Cap Take 1 Cap by mouth every day. 90 Cap 3   • vitamin D (CHOLECALCIFEROL) 1000 UNIT TABS Take 2,000 Units by mouth every day.       No current Epic-ordered facility-administered medications on file.        Allergies:  Amiodarone; Bactrim; Cipro xr; Metoprolol; Morphine; Phytoplex z-guard [petrolatum-zinc oxide]; Pseudoephedrine; Qvar [beclomethasone dipropionate]; Vibramycin; Atorvastatin calcium-polysorbate 80; Augmentin; Diltiazem; Flecainide; Keflex; Mucinex; Tramadol; Atorvastatin; and Tape    Health Maintenance: Due for annual wellness visit    ROS:  Gen: no fevers/chills, no changes in weight, no fatigue, no temperature intolerances  Eyes: no changes in vision  ENT: no sore throat, no ear pain  Pulm: no sob, no cough  CV: no chest pain, no palpitations  GI: no nausea/vomiting, no diarrhea  : no dysuria  MSk: no myalgias, no falls  Skin: + Pressure ulcer left and right medial buttock, no hair loss, no dry skin  Neuro: no headaches, no dizziness  Heme/Lymph: no easy bruising      Objective:     Vital signs reviewed  Exam:  /62 (BP Location: Right arm, Patient Position: Sitting, BP Cuff Size: Adult)   Pulse 88   Temp 36.4 °C (97.5 °F) (Temporal)   Ht 1.626 m (5' 4\")   Wt 82.6 kg (182 lb)   LMP 01/01/1993   SpO2 96%   BMI 31.24 kg/m²  Body mass index is 31.24 kg/m².    Gen: Alert and oriented, No apparent distress.  Neck: Neck is supple without lymphadenopathy.  Lungs: Normal effort, CTA bilaterally, no wheezes, rhonchi, or rales  CV: Regular rate and rhythm. No murmurs, rubs, or gallops.  Ext: No clubbing, cyanosis, edema.  Skin:   bilateral gluteal buttock with healing pressure ulcer, no drainage or erythema,+ blanchable    Assessment & Plan:     79 y.o. female with the following -     1. Pressure injury of buttock, stage 2, " unspecified laterality (HCC)  Chronic stable medical problem.  Continue follow-up with wound care.  Continue pressure-relief interventions, moving/changing positions every 2 hours.  Red flags discussed.  Monitor.    2. Stage 3 chronic kidney disease (HCC)  Chronic stable medical problem.  Continue to avoid nephrotoxins and NSAIDs.  Monitor.    3. Acquired hypothyroidism  Chronic stable medical problem.  Continue levothyroxine.  Monitor and follow.      Return in about 6 months (around 12/4/2020).    Please note that this dictation was created using voice recognition software. I have made every reasonable attempt to correct obvious errors, but I expect that there are errors of grammar and possibly content that I did not discover before finalizing the note.

## 2020-06-09 ENCOUNTER — OFFICE VISIT (OUTPATIENT)
Dept: WOUND CARE | Facility: MEDICAL CENTER | Age: 80
End: 2020-06-09
Attending: NURSE PRACTITIONER
Payer: MEDICARE

## 2020-06-09 VITALS
DIASTOLIC BLOOD PRESSURE: 71 MMHG | OXYGEN SATURATION: 97 % | SYSTOLIC BLOOD PRESSURE: 129 MMHG | TEMPERATURE: 98.1 F | HEART RATE: 102 BPM | RESPIRATION RATE: 16 BRPM

## 2020-06-09 DIAGNOSIS — L89.303 PRESSURE INJURY OF BUTTOCK, STAGE 3, UNSPECIFIED LATERALITY (HCC): Primary | ICD-10-CM

## 2020-06-09 DIAGNOSIS — E66.09 OBESITY DUE TO EXCESS CALORIES, UNSPECIFIED CLASSIFICATION, UNSPECIFIED WHETHER SERIOUS COMORBIDITY PRESENT: ICD-10-CM

## 2020-06-09 DIAGNOSIS — R54 AGE-RELATED PHYSICAL DEBILITY: ICD-10-CM

## 2020-06-09 PROCEDURE — 99213 OFFICE O/P EST LOW 20 MIN: CPT

## 2020-06-09 PROCEDURE — 99213 OFFICE O/P EST LOW 20 MIN: CPT | Performed by: NURSE PRACTITIONER

## 2020-06-09 ASSESSMENT — ENCOUNTER SYMPTOMS
SHORTNESS OF BREATH: 0
FEVER: 0
NAUSEA: 0
COUGH: 0
CLAUDICATION: 0
DIARRHEA: 0
CHILLS: 0
BACK PAIN: 1
CONSTIPATION: 0
VOMITING: 0
NERVOUS/ANXIOUS: 0
DEPRESSION: 0

## 2020-06-09 ASSESSMENT — PAIN SCALES - GENERAL: PAINLEVEL: NO PAIN

## 2020-06-09 NOTE — PATIENT INSTRUCTIONS
Should you experience any significant changes in your wound(s) such as infection (redness, swelling, localized heat, increased pain, fever >101 F, chills) or have any questions regarding your home care instructions, please contact the wound center [phone]. If after hours, contact your primary care physician or go the hospital emergency room.  Apply zinc based barrier cream after your incontinent episodes and change your pads. Keep turning from side to side to offload site from pressure and friction; it is advisable not to sleep in your recliner.

## 2020-06-09 NOTE — PROGRESS NOTES
Provider Encounter- Pressure Injury    HISTORY OF PRESENT ILLNESS                              START OF CARE IN CLINIC: 3/13/2020               REFERRING PROVIDER: Kishore Price MD                 WOUND-pressure ulcer              LOCATION: Shallow stage III, medial right buttock wound                                  Shallow stage III, left buttock, mirroring each right buttock wound (kissing  wounds)              WOUND HISTORY: Patient is an 78 year old female who has had wounds to her buttocks off and on since December 2018. She is unsure of how the wounds started, but does state that she has a bad back and is in her recliner chair day and night.  She does get up several times per day to go to the bathroom, but otherwise does not ambulate much.  Her  brings her food, does all the cooking, cleaning and shopping.  She was seen previously for these wounds in the clinic, discharged in December 2019 due to healing.  She now has a Roho cushion for offloading for her chair.    5/19/2020 patient was seen by PHYLLIS Ivory (who later through my chart) referred the patient back to the wound clinic after a break due to fear of Covid-19 exposure.  Mrs. Magaña reports that she in fact does have incontinence issues.  She reports it is hard to keep her buttocks dry.  He has been applying Gold Bond with lidocaine to bilateral buttocks wounds.  The wounds today 5/19/2020 are superficial.  Patient reports that she has been offloading frequently and shifting left to right with towels, obviously she has been doing an adequate job since the wounds are superficial and near resolution at this time.  Patient returns to [facility] for treatment of right and left buttock ulcers.                  PERTINENT PMH: Obesity, limited mobility, chronic back pain, CAD, bilateral knee replacements, right hip replacement    Contributing factors: Immobility -yes.  Activity level: -Sedentary.  Support surfaces: -Waffle cushion to  chair.  Incontinence: No.  Nutritional state: Well-nourished. Caregiver assistance: . Obesity: Yes. Moisture: Yes      DIABETES: No    TOBACCO USE: She has never smoked or used tobacco products      Interval History:  5/19/2020: Clinic visit with PHYLLIS Marie. Patient states that they are feeling well today.  Patient denies fever, chills, nausea, vomiting, lightheadedness, dizziness, shortness of breath and chest pain.  Patient returns to [facility] for help and assistance in resolution of bilateral buttock ulcers.  Patient brought her Roho cushion she got from the Internet.  Patient advised to use this cushion over waffle cushion as long as she has the cushion to the recommended pressure.    6/2/2020: Clinic visit with PHYLLIS Marie. Patient states that they are feeling well today.  Patient denies fever, chills, nausea, vomiting, lightheadedness, dizziness, shortness of breath and chest pain.  Patient seemed like she had some confusion today related to chronic incontinence versus urinary tract infection.  Patient resented with out hydrocolloid dressing to bilateral buttock wounds.  Patient was nonspecific into her reason why she did not continue with her wound dressing.  Patient has also not been compliant with offloading of her buttock wounds.  Reiterated to the patient that she needs to keep the hydrocolloid dressing on we have given her enough supplies to get her through to next clinic visit.  Furthermore, she needs to reposition herself every 30 minutes to an hour to reduce pressure and allow the wounds to heal.  Patient does have a Roho cushion that she uses in her recliner.  Patient is fasting today for blood work patient to see her PCP Latasha Mendoza for follow-up.    6/9/2020: Clinic visit with PHYLLIS Marie. Patient states that they are feeling well today.  Patient denies fever, chills, nausea, vomiting, lightheadedness, dizziness, shortness of breath and chest pain.   Patient's wound has 100% epithelialized patient given thorough instructions on application of barrier zinc paste.  Patient will be discharged from [facility] at this time.            REVIEW OF SYSTEMS:   Review of Systems   Constitutional: Negative for chills and fever.   Respiratory: Negative for cough and shortness of breath.         Shortness of breath with exertion   Cardiovascular: Positive for leg swelling. Negative for chest pain and claudication.   Gastrointestinal: Negative for constipation, diarrhea, nausea and vomiting.   Genitourinary:        Frequent incontinence of urine  Occasional incontinence of stool  Wears absorptive pad   Musculoskeletal: Positive for back pain and joint pain.        Chronic back and hip pain, debilitating   Psychiatric/Behavioral: Negative for depression. The patient is not nervous/anxious.         On antidepressant, denies wanting to harm her self or others       PHYSICAL EXAMINATION:     Physical Exam   Constitutional: She is oriented to person, place, and time.   Obese elderly female   HENT:   Head: Normocephalic.   Eyes: Pupils are equal, round, and reactive to light.   Pulmonary/Chest: Effort normal.   Musculoskeletal:         General: Edema present.      Comments: Generalized dependent bilateral lower extremity edema  Uses walker for ambulation   Neurological: She is alert and oriented to person, place, and time.   Skin: Skin is warm.   Pressure ulcers of bilateral medial buttocks resolved   Psychiatric: Mood, memory, affect and judgment normal.             PROCEDURE: Small amount of dry flaky nonviable tissue removed from wound base.  Underneath dried skin is 100% epithelial tissue.  Patient has no open wounds at this time will be discharge from [facility].          PATIENT EDUCATION  -Etiology of pressure injury  -Importance of offloading  -Strategies for offloading in bed and when seated discussed and demonstrated  - Importance of adequate nutrition for wound healing  -Increase  protein intake (unless contraindicated by renal status)   - Advised to go to ER for any increased redness, swelling, drainage or odor, or if patient develops fever, chills, nausea or vomiting.    ASSESSMENT AND PLAN:     1. Pressure injury of buttock, stage 3, unspecified laterality (HCC)  Comments: Shallow stage III ulcers to bilateral medial buttocks, mirroring each other, kissing wounds.  Left buttock wound was initially noted to be closed, but has reopened    06/9/20 comments: Patient's wounds have 100% epithelialized    Wound care: Patient instructed to apply zinc cream to bilateral buttocks after each episode of incontinence she was instructed to dry herself wash herself clean and apply a new layer of zinc paste.    2. Age-related physical debility  Comments: Patient does not ambulate much, spends most of her day sitting due to physical weakness and chronic back pain.   06/9/20: Comments: Patient encouraged to mobilize as much as possible to increase physical strength as well as to relieve pressure on bilateral buttock wounds.    3. Obesity due to excess calories, unspecified classification, unspecified whether serious comorbidity present  Comments: Complicating factor.  Impaired wound healing potential.  Impaired mobility      Patient was seen for 15 minutes face to face of which > 50% of appointment time was spent on counseling and coordination of care regarding the above.      Please note that this dictation was created using voice recognition software. I have worked with technical experts from [employer] to optimize the interface.  I have made every reasonable attempt to correct obvious errors, but there may be errors of grammar and possibly content that I did not discover before finalizing the note.

## 2020-06-09 NOTE — CERTIFICATION
Advanced Wound Care   [facility]   [address]   [address]   [place] [address]   [phone] Fax: [phone]    Discharge Note      Referring Physician: Kishore Price MD  Wound Etiology: pressure ulcer stage 3  Wound location: bilateral buttocks    Date of Discharge: 6/9/20    Assessment:  Discharge patient at this time secondary to wound resolution.  Thank you for the referral and the opportunity to treat your patient.      Clinician Signature: _____________________________ Date:_______________

## 2020-06-16 ENCOUNTER — APPOINTMENT (OUTPATIENT)
Dept: WOUND CARE | Facility: MEDICAL CENTER | Age: 80
End: 2020-06-16
Attending: NURSE PRACTITIONER
Payer: MEDICARE

## 2020-06-17 ENCOUNTER — APPOINTMENT (OUTPATIENT)
Dept: WOUND CARE | Facility: MEDICAL CENTER | Age: 80
End: 2020-06-17
Attending: NURSE PRACTITIONER
Payer: MEDICARE

## 2020-06-18 ENCOUNTER — ANTICOAGULATION MONITORING (OUTPATIENT)
Dept: VASCULAR LAB | Facility: MEDICAL CENTER | Age: 80
End: 2020-06-18

## 2020-06-18 DIAGNOSIS — Z79.01 CHRONIC ANTICOAGULATION: ICD-10-CM

## 2020-06-18 LAB — INR PPP: 2.1 (ref 2–3.5)

## 2020-06-18 NOTE — PROGRESS NOTES
OP   Telephone Anticoagulation Service Note      Anticoagulation Summary  As of 2020    INR goal:   2.0-3.0   TTR:   72.9 % (4.9 y)   INR used for dosin.10 (2020)   Warfarin maintenance plan:   3.75 mg (2.5 mg x 1.5) every Fri; 2.5 mg (2.5 mg x 1) all other days   Weekly warfarin total:   18.75 mg   No change documented:   Taty Damon   Plan last modified:   Vladimir Taylor, PharmD (3/18/2020)   Next INR check:   2020   Target end date:   Indefinite    Indications    Atrial fibrillation (HCC) (Resolved) [I48.91]  Chronic anticoagulation [Z79.01]             Anticoagulation Episode Summary     INR check location:   Home Draw    Preferred lab:       Send INR reminders to:       Comments:   Winston YEAGER      Anticoagulation Care Providers     Provider Role Specialty Phone number    Lizzy Haywood M.D. Referring Cardiology [phone]    [facility] Responsible  [phone]    Vladimir Taylor, PharmD Responsible          Anticoagulation Patient Findings  Patient Findings     Negatives:   Signs/symptoms of thrombosis, Signs/symptoms of bleeding, Laboratory test error suspected, Change in health, Change in alcohol use, Change in activity, Upcoming invasive procedure, Emergency department visit, Upcoming dental procedure, Missed doses, Extra doses, Change in medications, Change in diet/appetite, Hospital admission, Bruising, Other complaints         Spoke with the patient on the phone today, reporting a therapeutic INR of 2.1. Confirmed the current warfarin dosing regimen and patient compliance. Patient denies any interval changes to diet and/or medications. Patient denies any signs/symptoms of bleeding or clotting.  Patient instructed to continue with the current warfarin dosing regimen, and asked to follow up again in 2 weeks.     Rayray GreenD

## 2020-06-23 ENCOUNTER — APPOINTMENT (OUTPATIENT)
Dept: WOUND CARE | Facility: MEDICAL CENTER | Age: 80
End: 2020-06-23
Attending: NURSE PRACTITIONER
Payer: MEDICARE

## 2020-06-30 ENCOUNTER — APPOINTMENT (OUTPATIENT)
Dept: WOUND CARE | Facility: MEDICAL CENTER | Age: 80
End: 2020-06-30
Attending: NURSE PRACTITIONER
Payer: MEDICARE

## 2020-07-02 ENCOUNTER — ANTICOAGULATION MONITORING (OUTPATIENT)
Dept: VASCULAR LAB | Facility: MEDICAL CENTER | Age: 80
End: 2020-07-02

## 2020-07-02 DIAGNOSIS — Z79.01 CHRONIC ANTICOAGULATION: ICD-10-CM

## 2020-07-02 LAB — INR PPP: 1.9 (ref 2–3.5)

## 2020-07-02 NOTE — PROGRESS NOTES
Anticoagulation Summary  As of 2020    INR goal:   2.0-3.0   TTR:   72.7 % (5 y)   INR used for dosin.90! (2020)   Warfarin maintenance plan:   3.75 mg (2.5 mg x 1.5) every Fri; 2.5 mg (2.5 mg x 1) all other days   Weekly warfarin total:   18.75 mg   Plan last modified:   Vladimir Taylor PharmD (3/18/2020)   Next INR check:   2020   Target end date:   Indefinite    Indications    Atrial fibrillation (HCC) (Resolved) [I48.91]  Chronic anticoagulation [Z79.01]             Anticoagulation Episode Summary     INR check location:   Home Draw    Preferred lab:       Send INR reminders to:       Comments:   Winston YEAGER      Anticoagulation Care Providers     Provider Role Specialty Phone number    Lizzy Haywood M.D. Referring Cardiology [phone]    [facility] Responsible  [phone]    Vladimir Taylor, PharmD Responsible          Anticoagulation Patient Findings      Spoke with patient;s  to report a therapeutic INR (Per CHEST guidelines no dosage adjustment needed for 0.1 out of range INR in stable pts).    Pt instructed to continue with current warfarin dosing regimen, confirms dosing.   Pt denies any s/s of bleeding, bruising, clotting or any changes to diet or medication.    Will follow up in 2 week(s).     Kelli Ma, PeterD

## 2020-07-06 DIAGNOSIS — I10 ESSENTIAL HYPERTENSION: ICD-10-CM

## 2020-07-07 RX ORDER — LISINOPRIL 5 MG/1
5 TABLET ORAL
Qty: 100 TAB | Refills: 3 | Status: SHIPPED | OUTPATIENT
Start: 2020-07-07 | End: 2021-08-09

## 2020-07-16 ENCOUNTER — ANTICOAGULATION MONITORING (OUTPATIENT)
Dept: VASCULAR LAB | Facility: MEDICAL CENTER | Age: 80
End: 2020-07-16

## 2020-07-16 DIAGNOSIS — Z79.01 CHRONIC ANTICOAGULATION: ICD-10-CM

## 2020-07-16 LAB — INR PPP: 2.3 (ref 2–3.5)

## 2020-07-16 NOTE — PROGRESS NOTES
Anticoagulation Summary  As of 2020    INR goal:   2.0-3.0   TTR:   72.7 % (5 y)   INR used for dosin.30 (2020)   Warfarin maintenance plan:   3.75 mg (2.5 mg x 1.5) every Fri; 2.5 mg (2.5 mg x 1) all other days   Weekly warfarin total:   18.75 mg   Plan last modified:   Vladimir Taylor, PharmD (3/18/2020)   Next INR check:   2020   Target end date:   Indefinite    Indications    Atrial fibrillation (HCC) (Resolved) [I48.91]  Chronic anticoagulation [Z79.01]             Anticoagulation Episode Summary     INR check location:   Home Draw    Preferred lab:       Send INR reminders to:       Comments:   Winston YEAGER      Anticoagulation Care Providers     Provider Role Specialty Phone number    Lizzy Haywood M.D. Referring Cardiology [phone]    [facility]  [phone]    Vladimir Taylor, PharmD Responsible          Anticoagulation Patient Findings  Patient Findings     Negatives:   Signs/symptoms of thrombosis, Signs/symptoms of bleeding, Laboratory test error suspected, Change in health, Change in alcohol use, Change in activity, Upcoming invasive procedure, Emergency department visit, Upcoming dental procedure, Missed doses, Extra doses, Change in medications, Change in diet/appetite, Hospital admission, Bruising, Other complaints          Spoke with patient to report a therapeutic INR.    Pt instructed to continue with current warfarin dosing regimen, confirms dosing.   Pt denies any s/s of bleeding, bruising, clotting or any changes to diet or medication.    Will follow up in 2 week(s).     Jose Cruz Caruso, Pharmacy Intern

## 2020-07-27 ENCOUNTER — TELEPHONE (OUTPATIENT)
Dept: MEDICAL GROUP | Facility: PHYSICIAN GROUP | Age: 80
End: 2020-07-27

## 2020-07-27 DIAGNOSIS — L89.302 PRESSURE INJURY OF BUTTOCK, STAGE 2, UNSPECIFIED LATERALITY (HCC): ICD-10-CM

## 2020-07-27 NOTE — TELEPHONE ENCOUNTER
1. Caller Name:Donte Magaña                           Call Back Number: [phone] (home)         How would the patient prefer to be contacted with a response: Phone call do NOT leave a detailed message    2. SPECIFIC Action To Be Taken: Referral pending, please sign.    3. Diagnosis/Clinical Reason for Request: Wound is opening    4. Specialty & Provider Name/Lab/Imaging Location: Wound clinic    5. Is appointment scheduled for requested order/referral: no    Patient was informed they will receive a return phone call from the office ONLY if there are any questions before processing their request. Advised to call back if they haven't received a call from the referral department in 5 days.

## 2020-07-29 ENCOUNTER — TELEPHONE (OUTPATIENT)
Dept: VASCULAR LAB | Facility: MEDICAL CENTER | Age: 80
End: 2020-07-29

## 2020-07-30 ENCOUNTER — ANTICOAGULATION MONITORING (OUTPATIENT)
Dept: VASCULAR LAB | Facility: MEDICAL CENTER | Age: 80
End: 2020-07-30

## 2020-07-30 DIAGNOSIS — Z79.01 CHRONIC ANTICOAGULATION: ICD-10-CM

## 2020-07-30 LAB — INR PPP: 2 (ref 2–3.5)

## 2020-07-30 ASSESSMENT — CHA2DS2 SCORE
PRIOR STROKE OR TIA OR THROMBOEMBOLISM: NO
CHA2DS2 VASC SCORE: 4
DIABETES: NO
AGE 65 TO 74: NO
AGE 75 OR GREATER: YES
HYPERTENSION: YES
CHF OR LEFT VENTRICULAR DYSFUNCTION: NO
VASCULAR DISEASE: NO
SEX: FEMALE

## 2020-07-30 NOTE — PROGRESS NOTES
Anticoagulation Summary  As of 2020    INR goal:   2.0-3.0   TTR:   73.0 % (5 y)   INR used for dosin.00 (2020)   Warfarin maintenance plan:   3.75 mg (2.5 mg x 1.5) every Fri; 2.5 mg (2.5 mg x 1) all other days   Weekly warfarin total:   18.75 mg   Plan last modified:   Vladimir Taylor, PharmD (3/18/2020)   Next INR check:   2020   Target end date:   Indefinite    Indications    Atrial fibrillation (HCC) (Resolved) [I48.91]  Chronic anticoagulation [Z79.01]             Anticoagulation Episode Summary     INR check location:   Home Draw    Preferred lab:       Send INR reminders to:       Comments:   Winston YEAGER      Anticoagulation Care Providers     Provider Role Specialty Phone number    Lizzy Haywood M.D. Referring Cardiology [phone]    [facility] Responsible  [phone]    Vladimir Taylor, PharmD Responsible          Anticoagulation Patient Findings  Patient Findings     Negatives:   Signs/symptoms of thrombosis, Signs/symptoms of bleeding, Laboratory test error suspected, Change in health, Change in alcohol use, Change in activity, Upcoming invasive procedure, Emergency department visit, Upcoming dental procedure, Missed doses, Extra doses, Change in medications, Change in diet/appetite, Hospital admission, Bruising, Other complaints            Spoke with patient today regarding  therapeutic INR of 2.0.  Patient denies any signs/symptoms of bruising or bleeding or any changes in diet and medications.  Instructed patient to call clinic with any questions or concerns.  Pt is to continue with current warfarin dosing regimen.  Follow up in 2 weeks to reduce risk of adverse events related to this high risk medication,  Warfarin.    Alem Vora PharmD Candidate

## 2020-08-05 DIAGNOSIS — Z79.01 CHRONIC ANTICOAGULATION: ICD-10-CM

## 2020-08-06 ENCOUNTER — APPOINTMENT (OUTPATIENT)
Dept: WOUND CARE | Facility: MEDICAL CENTER | Age: 80
End: 2020-08-06
Attending: NURSE PRACTITIONER
Payer: MEDICARE

## 2020-08-11 ENCOUNTER — NON-PROVIDER VISIT (OUTPATIENT)
Dept: WOUND CARE | Facility: MEDICAL CENTER | Age: 80
End: 2020-08-11
Attending: NURSE PRACTITIONER
Payer: MEDICARE

## 2020-08-11 PROCEDURE — 99211 OFF/OP EST MAY X REQ PHY/QHP: CPT | Mod: 25

## 2020-08-11 PROCEDURE — 97597 DBRDMT OPN WND 1ST 20 CM/<: CPT

## 2020-08-11 RX ORDER — NITROFURANTOIN 25; 75 MG/1; MG/1
50 CAPSULE ORAL DAILY
COMMUNITY
Start: 2020-06-15 | End: 2021-11-10

## 2020-08-11 NOTE — CERTIFICATION
Non Provider Encounter- Pressure Injury    HISTORY OF PRESENT ILLNESS  Wound History:    START OF CARE IN CLINIC: 08/11/2020    REFERRING PROVIDER: Rosi FERGUSON   WOUND- Pressure Injury.    LOCATION AND STAGE: Bilateral medial buttocks - Left stage 3; right stage 2     HISTORY: Patient is an 79 year old female well known to [facility] for same wounds. These wounds have been open off and on since December 2018. Patient is not sure how the wounds began, but does sit in her recliner for 23/24 hours a day. She does have a waffle cushion in her chair for offloading and does shift her weight frequently. She was last discharged for wound resolution in June 2020. She states that she believes that these wounds reopened shortly after she was discharged in June. She states that she has been having a hard time keeping the wounds dry due to incontinence. Her  is her caregiver and applies zinc barrier paste several times per day.      Pertinent Medical History: Afib on warfarin, lupus, CKD III, spinal stenosis      Contributing factors: Immobility - yes. Activity level: Sedentary. Support surfaces: Waffle cushion to chair. Incontinence: No Nutritional state: Well-nourished. Caregiver assistance: . Obesity: Yes. Moisture: Yes     TOBACCO USE: Denies. Has never used tobacco products    Pertinent Labs and Diagnostics:    Labs:     A1c:   Lab Results   Component Value Date/Time    HBA1C 5.3 02/13/2019 07:57 PM      IMAGING: None recent    VASCULAR STUDIES: N/A    LAST  WOUND CULTURE:  DATE : None recent           Fall Risk Assessment (jaydon all that apply with an X):  Completed 08/11/2020. Patient is a HIGH fall risk.    x65 years or older     Fall within the last 2 years   xUses ambulatory devices  Loss of protective sensation in feet   Use of prostethic/orthotic    Presence of lower extremity/foot/toe amputation   xTaking medication that increases risk (per facility policy)    Interventions Recommended (if any of the  "above are selected):   Use of Assistive Device: Pt has 3 walkers that she uses, a cane, and has a 3 story house.    Supervision with ambulation:    Assistance with ambulation:    Home safety education: Educational material provided     PAST MEDICAL HISTORY:   Past Medical History:   Diagnosis Date   • A-fib ([facility])    • Anesthesia     \"Tachycardia for 5 days after cataract surgery\"   • Anticoagulant long-term use 1/12/2012   • Arthritis     osteo-Knees, hips   • Atrial fibrillation ([facility])    • Backpain     R hip   • Bowel habit changes     diarrhea   • Breath shortness     with exertion, prn O2 2L   • Bronchitis Nov, 2013   • CAD (coronary artery disease)    • Depression    • Glaucoma 5/3/2011   • Hematoma complicating a procedure 11/3/2012   • Hemorrhagic disorder ([facility])     bruising/coumadin   • Hypertension    • Hypothyroid    • Lupus ([facility])    • Macular degeneration    • Menopause 1/12/2012   • Mitral regurgitation 10/30/2012   • Obesity 1/12/2012   • Pacemaker 2018   • Pneumonia feb,2013   • Pre-syncope 6/29/2018   • Pulmonary hypertension ([facility]) 10/30/2012   • PVC's (premature ventricular contractions) 1/12/2012   • Senile nuclear sclerosis    • Spinal stenosis of lumbar region at multiple levels    • Unspecified cataract     repaired bilateral   • Unspecified urinary incontinence    • Urinary bladder disorder      PAST SURGICAL HISTORY:   Past Surgical History:   Procedure Laterality Date   • PB COMBINED ANT/POST COLPORRHAPHY  1/14/2020    Procedure: COLPORRHAPHY, COMBINED ANTEROPOSTERIOR - PERINEOPLASTY;  Surgeon: Waqas Robin M.D.;  Location: SURGERY SAME DAY [facility];  Service: Gynecology   • PB LAP,DIAGNOSTIC ABDOMEN  1/14/2020    Procedure: PELVISCOPY;  Surgeon: Waqas Robin M.D.;  Location: SURGERY SAME DAY [facility];  Service: Gynecology   • ENTEROCELE REPAIR  1/14/2020    Procedure: REPAIR, ENTEROCELE;  Surgeon: Waqas Robin M.D.;  Location: SURGERY SAME DAY [facility];  " Service: Gynecology   • BLADDER SLING FEMALE  1/14/2020    Procedure: BLADDER SLING, FEMALE - TOT;  Surgeon: Waqas Robin M.D.;  Location: SURGERY SAME DAY [facility];  Service: Gynecology   • VAGINAL SUSPENSION  1/14/2020    Procedure: COLPOPEXY - SACROSPINOUS VAULT SUSPENSION;  Surgeon: Waqas Robin M.D.;  Location: SURGERY SAME DAY [facility];  Service: Gynecology   • SALPINGO OOPHORECTOMY Bilateral 1/14/2020    Procedure: SALPINGO-OOPHORECTOMY;  Surgeon: Waqas Robin M.D.;  Location: SURGERY SAME DAY [facility];  Service: Gynecology   • IRRIGATION & DEBRIDEMENT ORTHO Right 2/14/2019    Procedure: IRRIGATION & DEBRIDEMENT ORTHO-HIP WOUND ;  Surgeon: Vladimir Lee M.D.;  Location: [facility];  Service: Orthopedics   • HIP ARTH ANTERIOR TOTAL Right 1/17/2019    Procedure: HIP ARTHROPLASTY ANTERIOR TOTAL;  Surgeon: Juan C Mercedes M.D.;  Location: [facility];  Service: Orthopedics   • PACEMAKER INSERTION  06/30/2018    Dual Chamber   • KNEE ARTHROPLASTY TOTAL Right 6/23/2016    Procedure: KNEE ARTHROPLASTY TOTAL;  Surgeon: Heriberto Lozada M.D.;  Location: [facility];  Service:    • KNEE ARTHROPLASTY TOTAL Left 5/28/2015    Procedure: KNEE ARTHROPLASTY TOTAL;  Surgeon: Heriberto Lozada M.D.;  Location: [facility];  Service:    • CATARACT PHACO WITH IOL Right 5/5/2015    Procedure: IOL OD - STANDARD;  Surgeon: Dmitry Bejarano M.D.;  Location: [facility];  Service:    • CATARACT PHACO WITH IOL  4/21/2015    Performed by Dmitry Bejarano M.D. at [facility]   • RECOVERY  11/30/2010    Performed by SURGERY, CATH-RECOVERY at [facility] SAME DAY [facility]   • COLONOSCOPY  2008    Normal    [facility]   • ABDOMINAL HYSTERECTOMY TOTAL  April 15,1975    still has ovaries   • OPEN REDUCTION      left ankle   • OTHER      Removed pins from left ankle   • OTHER CARDIAC SURGERY  12/2017 and 07/19/2018     Cardiac Ablation  "  • TONSILLECTOMY AND ADENOIDECTOMY        MEDICATIONS:   Current Outpatient Medications   Medication   • lisinopril (PRINIVIL) 5 MG Tab   • levothyroxine (SYNTHROID) 50 MCG Tab   • atenolol (TENORMIN) 50 MG Tab   • warfarin (COUMADIN) 2.5 MG Tab   • nystatin (MYCOSTATIN) powder   • oxybutynin SR (DITROPAN-XL) 5 MG TABLET SR 24 HR   • estradiol (ESTRACE) 2 MG Tab   • spironolactone (ALDACTONE) 50 MG Tab   • sertraline (ZOLOFT) 50 MG Tab   • Triamcinolone Acetonide 0.025 % Lotion   • Mirabegron ER (MYRBETRIQ) 50 MG TABLET SR 24 HR   • ipratropium (ATROVENT) 0.03 % Solution   • Acetaminophen 500 MG Cap   • PREBIOTIC PRODUCT PO   • Lutein 20 MG Cap   • vitamin D (CHOLECALCIFEROL) 1000 UNIT TABS     No current facility-administered medications for this visit.      ALLERGIES:    Allergies   Allergen Reactions   • Amiodarone Hives     Throat and tongue itching   • Bactrim Shortness of Breath   • Cipro Xr Swelling   • Metoprolol Swelling     Causes throat swelling   • Morphine Unspecified     Hallucinations   • Phytoplex Z-Guard [Petrolatum-Zinc Oxide] Unspecified     \"causes burning\"   • Pseudoephedrine Palpitations   • Qvar [Beclomethasone Dipropionate] Unspecified     Pressure on heart     • Vibramycin Shortness of Breath   • Atorvastatin Calcium-Polysorbate 80 Unspecified     Muscle aches     • Augmentin Unspecified     Unknown reaction   • Diltiazem Rash     rash   • Flecainide Unspecified     dizziness   • Keflex Unspecified     Pt states \"Unsure\".   • Mucinex Unspecified     GI Distress     • Tramadol Unspecified     crying   • Atorvastatin Myalgia   • Tape Rash     Paper tape okay     SOCIAL HISTORY:   Social History     Socioeconomic History   • Marital status:      Spouse name: Not on file   • Number of children: Not on file   • Years of education: Not on file   • Highest education level: Not on file   Occupational History   • Not on file   Social Needs   • Financial resource strain: Not on file   • Food " insecurity     Worry: Not on file     Inability: Not on file   • Transportation needs     Medical: Not on file     Non-medical: Not on file   Tobacco Use   • Smoking status: Never Smoker   • Smokeless tobacco: Never Used   Substance and Sexual Activity   • Alcohol use: No   • Drug use: No   • Sexual activity: Not Currently     Partners: Male     Birth control/protection: Post-Menopausal   Lifestyle   • Physical activity     Days per week: Not on file     Minutes per session: Not on file   • Stress: Not on file   Relationships   • Social connections     Talks on phone: Not on file     Gets together: Not on file     Attends Jehovah's witness service: Not on file     Active member of club or organization: Not on file     Attends meetings of clubs or organizations: Not on file     Relationship status: Not on file   • Intimate partner violence     Fear of current or ex partner: Not on file     Emotionally abused: Not on file     Physically abused: Not on file     Forced sexual activity: Not on file   Other Topics Concern   • Not on file   Social History Narrative   • Not on file     FAMILY HISTORY:   Family History   Problem Relation Age of Onset   • Stroke Mother    • Diabetes Father    • Stroke Sister    • Heart Disease Brother    • Stroke Sister    • GI Disease Daughter         Crohn's Disease   • Heart Disease Daughter         CHF   • Other Daughter         Chronic Pain--Lymphedema   • Cancer Paternal Aunt         breast     WOUND ASSESSMENT:  Wound 08/11/20 Pressure Injury Buttocks Left medial buttock stage 3 (Active)   Wound Image    08/11/20 1430   Site Assessment Brown;Red;Purple 08/11/20 1430   Periwound Assessment Intact 08/11/20 1430   Margins Attached edges 08/11/20 1430   Drainage Amount KESHA 08/11/20 1430   Treatments Cleansed;Topical Lidocaine;CSWD - Conservative Sharp Wound Debridement 08/11/20 1430   Wound Cleansing Normal Saline Irrigation 08/11/20 1430   Periwound Protectant Barrier Paste 08/11/20 1430    Dressing Cleansing/Solutions Not Applicable 08/11/20 1430   Dressing Change/Treatment Frequency As Needed 08/11/20 1430   Non-staged Wound Description Full thickness 08/11/20 1430   Post-Procedure Length (cm) 0.4 cm 08/11/20 1430   Post-Procedure Width (cm) 0.5 cm 08/11/20 1430   Post-Procedure Depth (cm) 0.1 cm 08/11/20 1430   Post-Procedure Surface Area (cm^2) 0.2 cm^2 08/11/20 1430   Post-Procedure Volume (cm^3) 0.02 cm^3 08/11/20 1430   Tunneling (cm) 0 cm 08/11/20 1430   Undermining (cm) 0 cm 08/11/20 1430   Wound Odor None 08/11/20 1430   Pulses N/A 08/11/20 1430   Exposed Structures None 08/11/20 1430       Wound 08/11/20 Pressure Injury Buttocks Right medial buttock stage 2  (Active)   Wound Image    08/11/20 1430   Site Assessment Brown;[place] 08/11/20 1430   Periwound Assessment Intact 08/11/20 1430   Margins Attached edges 08/11/20 1430   Drainage Amount KESHA 08/11/20 1430   Treatments Cleansed;Topical Lidocaine;CSWD - Conservative Sharp Wound Debridement 08/11/20 1430   Wound Cleansing Normal Saline Irrigation 08/11/20 1430   Periwound Protectant Barrier Paste 08/11/20 1430   Dressing Cleansing/Solutions Not Applicable 08/11/20 1430   Dressing Change/Treatment Frequency As Needed 08/11/20 1430   Non-staged Wound Description Partial thickness 08/11/20 1430   Post-Procedure Length (cm) 0.3 cm 08/11/20 1430   Post-Procedure Width (cm) 0.3 cm 08/11/20 1430   Post-Procedure Depth (cm) 0.1 cm 08/11/20 1430   Post-Procedure Surface Area (cm^2) 0.09 cm^2 08/11/20 1430   Post-Procedure Volume (cm^3) 0.01 cm^3 08/11/20 1430   Tunneling (cm) 0 cm 08/11/20 1430   Undermining (cm) 0 cm 08/11/20 1430   Wound Odor None 08/11/20 1430   Pulses N/A 08/11/20 1430   Exposed Structures None 08/11/20 1430     Pre-Debridement photo          Procedures:    -2% viscous lidocaine applied topically to wound bed for approximately 5 minutes prior to debridement  -Scalpel and forceps used to debride wound beds. Entire surface of  wound, ~1.5 cm2 debrided.  -Refer to flowsheet for wound care details.     Post-debridement photo          PATIENT EDUCATION  -Etiology of pressure injury  -Importance of offloading and frequent repositioning  -Strategies for offloading in bed and when seated discussed and demonstrated  - Importance of adequate nutrition for wound healing  - Advised to go to ER for any increased redness, swelling, drainage or odor, or if patient develops fever, chills, nausea or vomiting.

## 2020-08-13 ENCOUNTER — ANTICOAGULATION MONITORING (OUTPATIENT)
Dept: VASCULAR LAB | Facility: MEDICAL CENTER | Age: 80
End: 2020-08-13

## 2020-08-13 DIAGNOSIS — Z79.01 CHRONIC ANTICOAGULATION: ICD-10-CM

## 2020-08-13 LAB — INR PPP: 2 (ref 2–3.5)

## 2020-08-13 NOTE — PROGRESS NOTES
Anticoagulation Summary  As of 2020    INR goal:  2.0-3.0   TTR:  73.2 % (5.1 y)   INR used for dosin.00 (2020)   Warfarin maintenance plan:  3.75 mg (2.5 mg x 1.5) every Fri; 2.5 mg (2.5 mg x 1) all other days   Weekly warfarin total:  18.75 mg   Plan last modified:  Vladimir Taylor, PharmD (3/18/2020)   Next INR check:  2020   Target end date:  Indefinite    Indications    Atrial fibrillation (HCC) (Resolved) [I48.91]  Chronic anticoagulation [Z79.01]             Anticoagulation Episode Summary     INR check location:  Home Draw    Preferred lab:      Send INR reminders to:      Comments:  Winston YEAGER      Anticoagulation Care Providers     Provider Role Specialty Phone number    Lizzy Haywood M.D. Referring Cardiology [phone]    [facility] Responsible  [phone]    Vladimir Taylor, PharmD Responsible          Anticoagulation Patient Findings          Spoke with Mr. Magaña to report a therapeutic INR of 2.0. Continue current dosing regimen.  Follow up in 2 weeks, to reduce the risk of adverse events related to this high risk medication, warfarin.    Massiel Coleman, Clinical Pharmacist

## 2020-08-18 ENCOUNTER — NON-PROVIDER VISIT (OUTPATIENT)
Dept: WOUND CARE | Facility: MEDICAL CENTER | Age: 80
End: 2020-08-18
Attending: NURSE PRACTITIONER
Payer: MEDICARE

## 2020-08-18 PROCEDURE — 97597 DBRDMT OPN WND 1ST 20 CM/<: CPT

## 2020-08-18 NOTE — PATIENT INSTRUCTIONS
-Keep your wound dressing clean, dry, and intact.    -Change your dressing if it becomes soiled, soaked, or falls off.    -Should you experience any significant changes in your wound(s), such as infection (redness, swelling, localized heat, increased pain, fever > 101 F, chills) or have any questions regarding your home care instructions, please contact the wound center at [phone]. If after hours, contact your primary care physician or go to the hospital emergency room.

## 2020-08-18 NOTE — NON-PROVIDER
CSWD with curette to remove non-viable crust layer over buttock wound beds and telma wound area of ~0.3 cm2 after application of 2% topical lidocaine for a ~5 minute dwell time. The patient tolerated the procedure well.

## 2020-08-25 ENCOUNTER — APPOINTMENT (OUTPATIENT)
Dept: WOUND CARE | Facility: MEDICAL CENTER | Age: 80
End: 2020-08-25
Attending: NURSE PRACTITIONER
Payer: MEDICARE

## 2020-08-27 ENCOUNTER — ANTICOAGULATION MONITORING (OUTPATIENT)
Dept: VASCULAR LAB | Facility: MEDICAL CENTER | Age: 80
End: 2020-08-27

## 2020-08-27 DIAGNOSIS — Z79.01 CHRONIC ANTICOAGULATION: ICD-10-CM

## 2020-08-27 LAB — INR PPP: 2.2 (ref 2–3.5)

## 2020-08-27 NOTE — PROGRESS NOTES
Anticoagulation Summary  As of 2020    INR goal:  2.0-3.0   TTR:  73.4 % (5.1 y)   INR used for dosin.20 (2020)   Warfarin maintenance plan:  3.75 mg (2.5 mg x 1.5) every Fri; 2.5 mg (2.5 mg x 1) all other days   Weekly warfarin total:  18.75 mg   Plan last modified:  Vladimir Taylor PharmD (3/18/2020)   Next INR check:  9/10/2020   Target end date:  Indefinite    Indications    Atrial fibrillation (HCC) (Resolved) [I48.91]  Chronic anticoagulation [Z79.01]             Anticoagulation Episode Summary     INR check location:  Home Draw    Preferred lab:      Send INR reminders to:      Comments:  Winston YEAGER      Anticoagulation Care Providers     Provider Role Specialty Phone number    Lizzy Haywood M.D. Referring Cardiology [phone]    [facility] Responsible  [phone]    Vladimir Taylor PharmD Responsible          Anticoagulation Patient Findings        Spoke to patient on the phone.   INR  therapeutic.   Denies signs/symptoms of bleeding and/or thrombosis.   Denies changes to diet or medications.   Follow up appointment in 2 week(s).    Continue weekly warfarin dose as noted      Fran Leyva, PharmD, MS, BCACP, LCC    This note was created using voice recognition software (Dragon). The accuracy of the dictation is limited by the abilities of the software. I have reviewed the note prior to signing, however some errors in grammar and context are still possible. If you have any questions related to this note please do not hesitate to contact our office.

## 2020-08-29 DIAGNOSIS — B35.9 TINEA: ICD-10-CM

## 2020-08-31 RX ORDER — NYSTATIN 100000 [USP'U]/G
POWDER TOPICAL
Qty: 30 G | Refills: 0 | Status: SHIPPED
Start: 2020-08-31 | End: 2020-12-29

## 2020-08-31 NOTE — TELEPHONE ENCOUNTER
Received request via: Pharmacy    Was the patient seen in the last year in this department? Yes lov 6/4/2020    Does the patient have an active prescription (recently filled or refills available) for medication(s) requested? No

## 2020-09-01 ENCOUNTER — OFFICE VISIT (OUTPATIENT)
Dept: WOUND CARE | Facility: MEDICAL CENTER | Age: 80
End: 2020-09-01
Attending: NURSE PRACTITIONER
Payer: MEDICARE

## 2020-09-01 VITALS
OXYGEN SATURATION: 93 % | DIASTOLIC BLOOD PRESSURE: 46 MMHG | TEMPERATURE: 98.6 F | SYSTOLIC BLOOD PRESSURE: 102 MMHG | HEART RATE: 84 BPM | RESPIRATION RATE: 20 BRPM

## 2020-09-01 DIAGNOSIS — B37.2 YEAST DERMATITIS: ICD-10-CM

## 2020-09-01 DIAGNOSIS — L89.303 PRESSURE INJURY OF BUTTOCK, STAGE 3, UNSPECIFIED LATERALITY (HCC): ICD-10-CM

## 2020-09-01 DIAGNOSIS — R54 AGE-RELATED PHYSICAL DEBILITY: ICD-10-CM

## 2020-09-01 DIAGNOSIS — E66.09 OBESITY DUE TO EXCESS CALORIES, UNSPECIFIED CLASSIFICATION, UNSPECIFIED WHETHER SERIOUS COMORBIDITY PRESENT: ICD-10-CM

## 2020-09-01 PROCEDURE — 99213 OFFICE O/P EST LOW 20 MIN: CPT | Performed by: NURSE PRACTITIONER

## 2020-09-01 PROCEDURE — 99214 OFFICE O/P EST MOD 30 MIN: CPT

## 2020-09-01 RX ORDER — NYSTATIN 100000 [USP'U]/G
1 POWDER TOPICAL 4 TIMES DAILY PRN
Qty: 15 G | Refills: 1 | Status: SHIPPED
Start: 2020-09-01 | End: 2020-11-05

## 2020-09-01 ASSESSMENT — ENCOUNTER SYMPTOMS
NERVOUS/ANXIOUS: 0
COUGH: 0
CHILLS: 0
SHORTNESS OF BREATH: 0
BACK PAIN: 1
FEVER: 0
VOMITING: 0
DEPRESSION: 0
DIARRHEA: 0
CONSTIPATION: 0
NAUSEA: 0
CLAUDICATION: 0

## 2020-09-01 ASSESSMENT — PAIN SCALES - GENERAL: PAINLEVEL: NO PAIN

## 2020-09-01 NOTE — PATIENT INSTRUCTIONS
Should you experience any significant changes in your wound(s) such as infection (redness, swelling, localized heat, increased pain, fever >101 F, chills) or have any questions regarding your home care instructions, please contact the wound center [phone]. If after hours, contact your primary care physician or go the hospital emergency room.  Keep buttocks clean and dry; changing frequently & immediately after incontinent episodes. Reapply zinc base cream daily and more often as needed.  your prescription cream at your pharmacy. TRIAD cream by [employer] is another good cream option. Keep pressure off of sites as much as possible with repositioning frequently.

## 2020-09-01 NOTE — PROGRESS NOTES
Provider Encounter- Pressure Injury    HISTORY OF PRESENT ILLNESS                              START OF CARE IN CLINIC: 9/1/2020               REFERRING PROVIDER: VICTORIANO Montes                 WOUND-pressure ulcer              LOCATION: Shallow stage III, medial right buttock wound                                  Shallow stage III, left buttock, mirroring each right buttock wound (kissing  wounds)              WOUND HISTORY: Patient is an 78 year old female who has had wounds to her buttocks off and on since December 2018. She is unsure of how the wounds started, but does state that she has a bad back and is in her recliner chair day and night.  She does get up several times per day to go to the bathroom, but otherwise does not ambulate much.  She also has problems with frequent urinary incontinence, wears absorptive pads.  Her  brings her food, does all the cooking, cleaning and shopping.  She was seen previously for these wounds in the clinic, discharged most recently in June 2020 due to healing.  She has a Roho cushion for offloading for her chair.                       PERTINENT PMH: Obesity, limited mobility, chronic back pain, CAD, bilateral knee replacements, right hip replacement    Contributing factors: Immobility -yes.  Activity level: -Sedentary.  Support surfaces: -Waffle cushion to chair.  Incontinence: No.  Nutritional state: Well-nourished. Caregiver assistance: . Obesity: Yes. Moisture: Yes      DIABETES: No    TOBACCO USE: She has never smoked or used tobacco products      Interval History:  9/1/2020: Clinic visit with PHYLLIS Holguin.  Patient returns to clinic with recurrence of same problem, pressure ulcers to bilateral medial buttocks, complicated by mobility and incontinence.  Her  has been applying barrier paste, ran out of zinc, has been using Desitin OTC.  Patient reports that she changes her incontinence pad 3-5 times per day.  Her main complaint  today is not pain, but itching.  According to her , these wounds reopened again approximately 2 to 3 weeks ago.            REVIEW OF SYSTEMS:   Review of Systems   Constitutional: Negative for chills and fever.   Respiratory: Negative for cough and shortness of breath.         Shortness of breath with exertion   Cardiovascular: Positive for leg swelling. Negative for chest pain and claudication.   Gastrointestinal: Negative for constipation, diarrhea, nausea and vomiting.   Genitourinary:        Frequent incontinence of urine  Occasional incontinence of stool  Wears absorptive pad   Musculoskeletal: Positive for back pain and joint pain.        Chronic back and hip pain, debilitating   Psychiatric/Behavioral: Negative for depression. The patient is not nervous/anxious.         On antidepressant, denies wanting to harm her self or others       PHYSICAL EXAMINATION:     Physical Exam   Constitutional: She is oriented to person, place, and time.   Obese elderly female   HENT:   Head: Normocephalic.   Eyes: Pupils are equal, round, and reactive to light.   Pulmonary/Chest: Effort normal.   Musculoskeletal:         General: Edema present.      Comments: Generalized dependent bilateral lower extremity edema  Uses walker for ambulation   Neurological: She is alert and oriented to person, place, and time.   Skin: Skin is warm.   Pressure ulcers of bilateral medial buttocks, both are shallow, and with scar tissue to periwound.   Psychiatric: Mood, memory, affect and judgment normal.             PROCEDURE: Small amount of dry flaky nonviable tissue removed from wound base.  Underneath dried skin is 100% epithelial tissue.  Patient has no open wounds at this time, however appears to have yeast dermatitis.  Will order antifungal, reassess next week.          PATIENT EDUCATION  -Etiology of pressure injury  -Importance of offloading  -Strategies for offloading in bed and when seated discussed and demonstrated  -  Importance of adequate nutrition for wound healing  -Increase protein intake (unless contraindicated by renal status)   - Advised to go to ER for any increased redness, swelling, drainage or odor, or if patient develops fever, chills, nausea or vomiting.    ASSESSMENT AND PLAN:     1. Pressure injury of buttock, stage 3, unspecified laterality (HCC)  Comments: Shallow stage III ulcers to bilateral medial buttocks, mirroring each other, kissing wounds.  Left buttock wound was initially noted to be closed, but has reopened    9/1/2020: Chronic, reopened approximately 3 weeks ago.  Patient complains of itching, possibly due to yeast infection.  Wounds are shallow, no need for debridement  -Time spent today explaining to patient and her  that these wounds are not likely to ever completely go away due to patient's immobility and constant moisture due to incontinence.    -I recommend they continue to use barrier paste, apply generously and frequently.  -Continue use of absorptive pads      Wound care: Zinc barrier paste and nystatin powder        2.  Yeast dermatitis    9/1/2020: Patient's main complaint today is itching.  She does present with a red patchy rash to her buttocks  -Rx for nystatin powder sent to patient's pharmacy due to possible overlying yeast dermatitis.  Patient's  was instructed to mix with zinc paste and apply.  -She is to return to clinic next week to assess response to antifungal.  We will likely discharge at that time.    3. Age-related physical debility  Comments: Patient does not ambulate much, spends most of her day sitting due to physical weakness and chronic back pain.     9/1/2020: Patient spends most of her time in a recliner chair.  Unable to sleep in a bed or ambulate more than a few steps at a time.  Ideally she would increase her mobility, however this apparently is not an option due to patient's back pain.    4. Obesity due to excess calories, unspecified classification,  unspecified whether serious comorbidity present  Comments: Complicating factor.  Impaired wound healing potential.  Impaired mobility      Patient was seen for 15 minutes face to face of which > 50% of appointment time was spent on counseling and coordination of care regarding the above.      Please note that this dictation was created using voice recognition software. I have worked with technical experts from [facility] to optimize the interface.  I have made every reasonable attempt to correct obvious errors, but there may be errors of grammar and possibly content that I did not discover before finalizing the note.

## 2020-09-08 ENCOUNTER — OFFICE VISIT (OUTPATIENT)
Dept: WOUND CARE | Facility: MEDICAL CENTER | Age: 80
End: 2020-09-08
Attending: NURSE PRACTITIONER
Payer: MEDICARE

## 2020-09-08 VITALS
HEART RATE: 86 BPM | RESPIRATION RATE: 20 BRPM | DIASTOLIC BLOOD PRESSURE: 70 MMHG | SYSTOLIC BLOOD PRESSURE: 134 MMHG | OXYGEN SATURATION: 94 % | TEMPERATURE: 98.9 F

## 2020-09-08 DIAGNOSIS — R54 AGE-RELATED PHYSICAL DEBILITY: ICD-10-CM

## 2020-09-08 DIAGNOSIS — R32 INCONTINENCE ASSOCIATED DERMATITIS: ICD-10-CM

## 2020-09-08 DIAGNOSIS — L89.303 PRESSURE INJURY OF BUTTOCK, STAGE 3, UNSPECIFIED LATERALITY (HCC): ICD-10-CM

## 2020-09-08 DIAGNOSIS — L25.8 INCONTINENCE ASSOCIATED DERMATITIS: ICD-10-CM

## 2020-09-08 DIAGNOSIS — E66.09 OBESITY DUE TO EXCESS CALORIES, UNSPECIFIED CLASSIFICATION, UNSPECIFIED WHETHER SERIOUS COMORBIDITY PRESENT: ICD-10-CM

## 2020-09-08 PROCEDURE — 99213 OFFICE O/P EST LOW 20 MIN: CPT | Performed by: NURSE PRACTITIONER

## 2020-09-08 PROCEDURE — 99213 OFFICE O/P EST LOW 20 MIN: CPT

## 2020-09-08 RX ORDER — HYDROPHILIC CREAM
1 PASTE (GRAM) TOPICAL 2 TIMES DAILY
Qty: 71 G | Refills: 2 | Status: SHIPPED
Start: 2020-09-08 | End: 2021-03-17

## 2020-09-08 ASSESSMENT — ENCOUNTER SYMPTOMS
DEPRESSION: 0
DIARRHEA: 0
CONSTIPATION: 0
COUGH: 0
VOMITING: 0
CLAUDICATION: 0
FEVER: 0
BACK PAIN: 1
NAUSEA: 0
NERVOUS/ANXIOUS: 0
SHORTNESS OF BREATH: 0
CHILLS: 0

## 2020-09-08 NOTE — PROGRESS NOTES
Provider Encounter- Pressure Injury    HISTORY OF PRESENT ILLNESS                              START OF CARE IN CLINIC: 9/1/2020               REFERRING PROVIDER: VICTORIANO Montes                 WOUND-pressure ulcer              LOCATION: Shallow stage III, medial right buttock wound                                  Shallow stage III, left buttock, mirroring each right buttock wound (kissing  wounds)              WOUND HISTORY: Patient is an 78 year old female who has had wounds to her buttocks off and on since December 2018. She is unsure of how the wounds started, but does state that she has a bad back and is in her recliner chair day and night.  She does get up several times per day to go to the bathroom, but otherwise does not ambulate much.  She also has problems with frequent urinary incontinence, wears absorptive pads.  Her  brings her food, does all the cooking, cleaning and shopping.  She was seen previously for these wounds in the clinic, discharged most recently in June 2020 due to healing.  She has a Roho cushion for offloading for her chair.                       PERTINENT PMH: Obesity, limited mobility, chronic back pain, CAD, bilateral knee replacements, right hip replacement    Contributing factors: Immobility -yes.  Activity level: -Sedentary.  Support surfaces: -Waffle cushion to chair.  Incontinence: No.  Nutritional state: Well-nourished. Caregiver assistance: . Obesity: Yes. Moisture: Yes      DIABETES: No    TOBACCO USE: She has never smoked or used tobacco products      Interval History:  9/1/2020: Clinic visit with PHYLLIS Holguin.  Patient returns to clinic with recurrence of same problem, pressure ulcers to bilateral medial buttocks, complicated by mobility and incontinence.  Her  has been applying barrier paste, ran out of zinc, has been using Desitin OTC.  Patient reports that she changes her incontinence pad 3-5 times per day.  Her main complaint  today is not pain, but itching.  According to her , these wounds reopened again approximately 2 to 3 weeks ago.       9/8/2020 : Clinic visit with PHYLLIS Holguin.  Patient's wounds are essentially resolved again, no open areas.  However, due to her immobility and frequent urinary incontinence, these are quite likely to recur.  She did not  nystatin powder as ordered last clinic visit, states it was not at her pharmacy.  Patient states her urinary incontinence is worse, she thinks she has a bladder infection and is trying to get into see her urologist.     REVIEW OF SYSTEMS:   Review of Systems   Constitutional: Negative for chills and fever.   Respiratory: Negative for cough and shortness of breath.         Shortness of breath with exertion   Cardiovascular: Positive for leg swelling. Negative for chest pain and claudication.   Gastrointestinal: Negative for constipation, diarrhea, nausea and vomiting.   Genitourinary:        Frequent incontinence of urine  Occasional incontinence of stool  Wears absorptive pad   Musculoskeletal: Positive for back pain and joint pain.        Chronic back and hip pain, debilitating   Psychiatric/Behavioral: Negative for depression. The patient is not nervous/anxious.         On antidepressant, denies wanting to harm her self or others       PHYSICAL EXAMINATION:     Physical Exam   Constitutional: She is oriented to person, place, and time.   Obese elderly female   HENT:   Head: Normocephalic.   Eyes: Pupils are equal, round, and reactive to light.   Pulmonary/Chest: Effort normal.   Musculoskeletal:         General: Edema present.      Comments: Generalized dependent bilateral lower extremity edema  Uses walker for ambulation   Neurological: She is alert and oriented to person, place, and time.   Skin: Skin is warm.   Pressure ulcers of bilateral medial buttocks, both are now scarred over   Psychiatric: Mood, memory, affect and judgment normal.              PROCEDURE:   No need for debridement, no open wound          PATIENT EDUCATION  -Etiology of pressure injury  -Importance of offloading  -Strategies for offloading in bed and when seated discussed and demonstrated  - Importance of adequate nutrition for wound healing  -Increase protein intake (unless contraindicated by renal status)   - Advised to go to ER for any increased redness, swelling, drainage or odor, or if patient develops fever, chills, nausea or vomiting.    ASSESSMENT AND PLAN:     1. Pressure injury of buttock, stage 3, unspecified laterality (HCC)/incontinence dermatitis  Comments: Shallow stage III ulcers to bilateral medial buttocks, mirroring each other, kissing wounds.  Left buttock wound was initially noted to be closed, but has reopened    9/8/2020: Wounds are again closed, however due to frequent incontinence and constant pressure from sitting, very likely to recur.  His wounds are combination of incontinence associated dermatitis and pressure.  -Time spent today explaining to patient and her  that these wounds are not likely to ever completely go away due to patient's immobility and constant moisture due to incontinence.    -I recommend they continue to use barrier paste, Rx for triad sent to patient's pharmacy.  Hopefully this will be covered by her insurance as it is a much better product than zinc or Desitin.  -Continue use of absorptive pads  -Discharge from [facility].  Patient and her  understand that she is return to clinic if her wounds worsen, or are not well managed with barrier paste.    Wound care: Zinc barrier paste and nystatin powder (Rx for Triad sent to patient's pharmacy)        2. Age-related physical debility  Comments: Patient does not ambulate much, spends most of her day sitting due to physical weakness and chronic back pain.     9/1/2020: Patient spends most of her time in a recliner chair.  Unable to sleep in a bed or ambulate more than a few steps at a  time.  Ideally she would increase her mobility, however this apparently is not an option due to patient's back pain.    3. Obesity due to excess calories, unspecified classification, unspecified whether serious comorbidity present  Comments: Complicating factor.  Impaired wound healing potential.  Impaired mobility      Patient was seen for 15 minutes face to face of which > 50% of appointment time was spent on counseling and coordination of care regarding the above.      Please note that this dictation was created using voice recognition software. I have worked with technical experts from [facility] to optimize the interface.  I have made every reasonable attempt to correct obvious errors, but there may be errors of grammar and possibly content that I did not discover before finalizing the note.

## 2020-09-08 NOTE — PATIENT INSTRUCTIONS
Pt instr dc today, keep area clean, it will be fragile for a few days, bathe and dry area gently, as scar tissue only ever regains a maximum of 80% of the tensile strength of the surrounding skin, remodeling of scar can continue for 6mo - a year. Contact PCP for a referral back here if any problems with area opening and draining again. Pt understands

## 2020-09-11 ENCOUNTER — ANTICOAGULATION MONITORING (OUTPATIENT)
Dept: VASCULAR LAB | Facility: MEDICAL CENTER | Age: 80
End: 2020-09-11

## 2020-09-11 DIAGNOSIS — Z79.01 CHRONIC ANTICOAGULATION: ICD-10-CM

## 2020-09-11 LAB — INR PPP: 2.4 (ref 2–3.5)

## 2020-09-11 NOTE — PROGRESS NOTES
OP   Telephone Anticoagulation Service Note      Anticoagulation Summary  As of 2020    INR goal:  2.0-3.0   TTR:  73.6 % (5.2 y)   INR used for dosin.40 (2020)   Warfarin maintenance plan:  3.75 mg (2.5 mg x 1.5) every Fri; 2.5 mg (2.5 mg x 1) all other days   Weekly warfarin total:  18.75 mg   No change documented:  Taty Damon   Plan last modified:  Vladimir Taylor PharmD (3/18/2020)   Next INR check:  2020   Target end date:  Indefinite    Indications    Atrial fibrillation (HCC) (Resolved) [I48.91]  Chronic anticoagulation [Z79.01]             Anticoagulation Episode Summary     INR check location:  Home Draw    Preferred lab:      Send INR reminders to:      Comments:  Winston YEAGER      Anticoagulation Care Providers     Provider Role Specialty Phone number    Lizzy Haywood M.D. Referring Cardiology [phone]    [facility] Responsible  [phone]    Vladimir Taylor, PharmD Responsible          Anticoagulation Patient Findings     Left a message on the patient's voicemail today, informing the patient of a therapeutic INR of 2.4. Instructed the patient to call the clinic with any changes to diet or medications, with any signs/sx of bleeding or clotting or with any questions or concerns.     Patient instructed to continue with the current warfarin dosing regimen, and asked to follow up again in 2 weeks.      Rayray GreenD

## 2020-09-15 ENCOUNTER — APPOINTMENT (OUTPATIENT)
Dept: WOUND CARE | Facility: MEDICAL CENTER | Age: 80
End: 2020-09-15
Attending: NURSE PRACTITIONER
Payer: MEDICARE

## 2020-09-25 ENCOUNTER — ANTICOAGULATION MONITORING (OUTPATIENT)
Dept: VASCULAR LAB | Facility: MEDICAL CENTER | Age: 80
End: 2020-09-25

## 2020-09-25 DIAGNOSIS — Z79.01 CHRONIC ANTICOAGULATION: ICD-10-CM

## 2020-09-25 LAB — INR PPP: 3.3 (ref 2–3.5)

## 2020-09-25 NOTE — PROGRESS NOTES
OP   Telephone Anticoagulation Service Note      Anticoagulation Summary  As of 9/25/2020    INR goal:  2.0-3.0   TTR:  73.5 % (5.2 y)   INR used for dosing:  3.30 (9/25/2020)   Warfarin maintenance plan:  3.75 mg (2.5 mg x 1.5) every Fri; 2.5 mg (2.5 mg x 1) all other days   Weekly warfarin total:  18.75 mg   Plan last modified:  Vladimir Taylor, PharmD (3/18/2020)   Next INR check:  10/9/2020   Target end date:  Indefinite    Indications    Atrial fibrillation (HCC) (Resolved) [I48.91]  Chronic anticoagulation [Z79.01]             Anticoagulation Episode Summary     INR check location:  Home Draw    Preferred lab:      Send INR reminders to:      Comments:  Winston YEAGER      Anticoagulation Care Providers     Provider Role Specialty Phone number    Lizzy Haywood M.D. Referring Cardiology [phone]    [facility]ticoagulation [facility] Responsible  [phone]    Vladimir Taylor, PharmD Responsible          Anticoagulation Patient Findings  Patient Findings     Negatives:  Signs/symptoms of thrombosis, Signs/symptoms of bleeding, Laboratory test error suspected, Change in health, Change in alcohol use, Change in activity, Upcoming invasive procedure, Emergency department visit, Upcoming dental procedure, Missed doses, Extra doses, Change in medications, Change in diet/appetite, Hospital admission, Bruising, Other complaints         Spoke with the patient on the phone today, reporting a SUPRA-therapeutic INR of 3.3. Confirmed the current warfarin dosing regimen and patient compliance. Patient denies any cranberries, EtOH or recent illness.Patient denies any interval changes to diet and/or medications. Patient denies any signs/symptoms of bleeding or clotting.  No apparent reason for elevated INR today.   Patient instructed to reduced dose down to just 2.5mg TONIGHT, as a one time adjustment, then to resume her current dosing regimen thereafter. Patient will retest again in 2 weeks.     Rayray GreenD

## 2020-10-05 ENCOUNTER — OFFICE VISIT (OUTPATIENT)
Dept: MEDICAL GROUP | Facility: PHYSICIAN GROUP | Age: 80
End: 2020-10-05
Payer: MEDICARE

## 2020-10-05 VITALS
HEIGHT: 64 IN | DIASTOLIC BLOOD PRESSURE: 58 MMHG | WEIGHT: 195 LBS | BODY MASS INDEX: 33.29 KG/M2 | OXYGEN SATURATION: 93 % | HEART RATE: 84 BPM | SYSTOLIC BLOOD PRESSURE: 110 MMHG | TEMPERATURE: 97.8 F

## 2020-10-05 DIAGNOSIS — Z23 NEED FOR VACCINATION: ICD-10-CM

## 2020-10-05 DIAGNOSIS — L89.302 PRESSURE INJURY OF BUTTOCK, STAGE 2, UNSPECIFIED LATERALITY (HCC): ICD-10-CM

## 2020-10-05 DIAGNOSIS — I48.19 PERSISTENT ATRIAL FIBRILLATION (HCC): ICD-10-CM

## 2020-10-05 DIAGNOSIS — Z79.01 CHRONIC ANTICOAGULATION: ICD-10-CM

## 2020-10-05 DIAGNOSIS — E03.9 ACQUIRED HYPOTHYROIDISM: ICD-10-CM

## 2020-10-05 DIAGNOSIS — Z02.89 ENCOUNTER FOR COMPLETION OF FORM WITH PATIENT: ICD-10-CM

## 2020-10-05 PROCEDURE — 90662 IIV NO PRSV INCREASED AG IM: CPT | Performed by: NURSE PRACTITIONER

## 2020-10-05 PROCEDURE — G0008 ADMIN INFLUENZA VIRUS VAC: HCPCS | Performed by: NURSE PRACTITIONER

## 2020-10-05 PROCEDURE — 99214 OFFICE O/P EST MOD 30 MIN: CPT | Mod: 25 | Performed by: NURSE PRACTITIONER

## 2020-10-05 RX ORDER — MAGNESIUM OXIDE 400 MG/1
400 TABLET ORAL DAILY
COMMUNITY
Start: 2020-08-08 | End: 2021-02-16 | Stop reason: SDUPTHER

## 2020-10-05 ASSESSMENT — FIBROSIS 4 INDEX: FIB4 SCORE: 1.32

## 2020-10-05 NOTE — ASSESSMENT & PLAN NOTE
Chronic medical problem. She has followed up with wound care. She is working on staying active and changing positions frequently. Her  assists with dressing changes as needed.  She continues with barrier paste and nystatin powder as needed.

## 2020-10-05 NOTE — PROGRESS NOTES
"Subjective:     CC: Flu vaccine    HPI:   Donte presents today with with her  for the following:    Pressure injury of buttock, stage 2 (HCC)  Chronic medical problem. She has followed up with wound care. She is working on staying active and changing positions frequently. Her  assists with dressing changes as needed.  She continues with barrier paste and nystatin powder as needed.    Chronic anticoagulation  Chronic medical problem.  She is currently followed by the anticoagulation clinic.  She does have her own machine at home and does home testing.  She calls in her results to her pharmacist.  She had recent appointment and did decrease her dose for 1 day.  She is due for repeat INR in 2 weeks.       Hypothyroidism  Chronic medical problem.  She is currently taking levothyroxine 50 mcg daily empty stomach.  She is tolerating the medication.  Last lab results:  Results for DONTE DAVENPORT (MRN [id number]) as of 10/5/2020 11:28   Ref. Range 6/2/2020 11:56   TSH Latest Ref Range: 0.380 - 5.330 uIU/mL 0.916       Persistent atrial fibrillation (HCC)  Chronic medical problem.  She is taking atenolol 50 mg twice a day.  She is on warfarin and is followed by the anticoagulation clinic.  She denies any chest pain, shortness of breath, palpitations, dizziness, blurry vision, headaches, bloody stools, hematuria, or bleeding gums.       Past Medical History:   Diagnosis Date   • A-fib (HCC)    • Anesthesia     \"Tachycardia for 5 days after cataract surgery\"   • Anticoagulant long-term use 1/12/2012   • Arthritis     osteo-Knees, hips   • Atrial fibrillation (HCC)    • Backpain     R hip   • Bowel habit changes     diarrhea   • Breath shortness     with exertion, prn O2 2L   • Bronchitis Nov, 2013   • CAD (coronary artery disease)    • Depression    • Glaucoma 5/3/2011   • Hematoma complicating a procedure 11/3/2012   • Hemorrhagic disorder (HCC)     bruising/coumadin   • Hypertension    • Hypothyroid    • Lupus " ([facility])    • Macular degeneration    • Menopause 1/12/2012   • Mitral regurgitation 10/30/2012   • Obesity 1/12/2012   • Pacemaker 2018   • Pneumonia feb,2013   • Pre-syncope 6/29/2018   • Pulmonary hypertension (HCC) 10/30/2012   • PVC's (premature ventricular contractions) 1/12/2012   • Senile nuclear sclerosis    • Spinal stenosis of lumbar region at multiple levels    • Unspecified cataract     repaired bilateral   • Unspecified urinary incontinence    • Urinary bladder disorder        Social History     Tobacco Use   • Smoking status: Never Smoker   • Smokeless tobacco: Never Used   Substance Use Topics   • Alcohol use: No   • Drug use: No       Current Outpatient Medications Ordered in Epic   Medication Sig Dispense Refill   • Wound Dressings (TRIAD HYDROPHILIC WOUND DRESSI) Paste 1 Application by Apply externally route 2 times a day. 71 g 2   • nystatin (MYCOSTATIN) powder Apply 1 g to affected area(s) 4 times a day as needed. 15 g 1   • nystatin (MYCOSTATIN) powder APPLY UP TO 4 TIMES A DAY TO GROIN RASH AFTER WASHING WITH MILD SOAP AND WATER 30 g 0   • nitrofurantoin (MACROBID) 100 MG Cap      • lisinopril (PRINIVIL) 5 MG Tab Take 1 Tab by mouth every day. 100 Tab 3   • levothyroxine (SYNTHROID) 50 MCG Tab TAKE 1 TABLET BY MOUTH EVERY MORNING ON A EMPTY STOMACH 90 Tab 1   • atenolol (TENORMIN) 50 MG Tab Take 1 Tab by mouth 2 times a day. 200 Tab 3   • warfarin (COUMADIN) 2.5 MG Tab TAKE 1-1.5 TABS BY MOUTH EVERY DAY. [facility] 135 Tab 1   • oxybutynin SR (DITROPAN-XL) 5 MG TABLET SR 24 HR Take 5 mg by mouth every day.     • estradiol (ESTRACE) 2 MG Tab TAKE ONE TABLET BY MOUTH EVERY DAY 90 Tab 2   • spironolactone (ALDACTONE) 50 MG Tab TAKE ONE TABLET BY MOUTH EVERY MORNING AND EVERY EVENING 180 Tab 2   • sertraline (ZOLOFT) 50 MG Tab Take 1 Tab by mouth every day. 90 Tab 3   • Triamcinolone Acetonide 0.025 % Lotion 1 Squirt by Apply externally route 2 Times a Day. Apply  "to itching ears. 1 Bottle 1   • Mirabegron ER (MYRBETRIQ) 50 MG TABLET SR 24 HR Take 50 mg by mouth every day. 90 Tab 3   • ipratropium (ATROVENT) 0.03 % Solution SPRAY 2 SPRAYS IN NOSE EVERY 12 HOURS. 30 mL 3   • Acetaminophen 500 MG Cap Take 2 Caps by mouth 3 times a day.     • PREBIOTIC PRODUCT PO Take 2 Tabs by mouth every day.     • Lutein 20 MG Cap Take 1 Cap by mouth every day. 90 Cap 3   • vitamin D (CHOLECALCIFEROL) 1000 UNIT TABS Take 2,000 Units by mouth every day.     • magnesium oxide (MAG-OX) 400 MG Tab tablet        No current Epic-ordered facility-administered medications on file.        Allergies:  Amiodarone, Bactrim, Cipro xr, Metoprolol, Morphine, Phytoplex z-guard [petrolatum-zinc oxide], Pseudoephedrine, Qvar [beclomethasone dipropionate], Vibramycin, Atorvastatin calcium-polysorbate 80, Augmentin, Diltiazem, Flecainide, Keflex, Mucinex, Tramadol, Atorvastatin, and Tape    Health Maintenance: Due for AWV.    ROS:  Gen: no fevers/chills, no changes in weight  Eyes: no changes in vision  ENT: no sore throat, no bleeding gums  Pulm: no sob, no cough  CV: no chest pain, no palpitations  GI: no nausea/vomiting, no diarrhea, no bloody stools  : no dysuria, no hematuria  MSk: no myalgias, no falls  Skin: no rash  Neuro: no headaches, no dizziness    Objective:     Vital signs reviewed  Exam:  /58 (BP Location: Right arm, Patient Position: Sitting, BP Cuff Size: Adult)   Pulse 84   Temp 36.6 °C (97.8 °F) (Temporal)   Ht 1.626 m (5' 4\")   Wt 88.5 kg (195 lb)   LMP 01/01/1993   SpO2 93%   BMI 33.47 kg/m²  Body mass index is 33.47 kg/m².    Gen: Alert and oriented, No apparent distress.   present in exam room.    Eyes:   Lids normal. Glasses in place.   Neck: Neck is supple without lymphadenopathy.  Lungs: Normal effort, CTA bilaterally, no wheezes, rhonchi, or rales  CV: Regular rate and rhythm. No murmurs, rubs, or gallops.  Ext: No clubbing, cyanosis, edema.  Using four-wheel " walker with ambulation.      Assessment & Plan:     79 y.o. female with the following -     1. Persistent atrial fibrillation (HCC)  Chronic stable medical problem.  Continue metoprolol and warfarin.  Continue follow-up with anticoagulation clinic.  Her rate and rhythm is stable today.  Monitor and follow.    2. Chronic anticoagulation  Chronic stable medical problem.  Continue warfarin and follow-up with anticoagulation clinic.  No acute signs and symptoms today.  Monitor and follow.    3. Acquired hypothyroidism  Chronic stable medical problem.  Continue levothyroxine.  She is up-to-date on labs.  Monitor and follow.    4. Pressure injury of buttock, stage 2, unspecified laterality (HCC)  Chronic stable medical problem.  Continue frequent position changes and staying active.  Continue to use barrier paste and nystatin powder as needed.  Continue follow-up with wound clinic as needed.  Monitor and follow.    5. Encounter for completion of form with patient  New problem to examiner.  Patient brings in her DMV license renewal paperwork to be completed today.  She has had her eye appointment completed.  DMV form completed and copy will be scanned into chart.    6. Need for vaccination  New problem to examiner.  Vaccine indicated.  Patient is in agreement. I have personally administered the ordered medication/vaccine.  - INFLUENZA VACCINE, HIGH DOSE (65+ ONLY)      Return in about 6 months (around 4/5/2021).    Please note that this dictation was created using voice recognition software. I have made every reasonable attempt to correct obvious errors, but I expect that there are errors of grammar and possibly content that I did not discover before finalizing the note.

## 2020-10-05 NOTE — ASSESSMENT & PLAN NOTE
Chronic medical problem.  She is currently taking levothyroxine 50 mcg daily empty stomach.  She is tolerating the medication.  Last lab results:  Results for STEPHANIE DAVENPORT (MRN [id number]) as of 10/5/2020 11:28   Ref. Range 6/2/2020 11:56   TSH Latest Ref Range: 0.380 - 5.330 uIU/mL 0.916

## 2020-10-05 NOTE — ASSESSMENT & PLAN NOTE
Chronic medical problem.  She is currently followed by the anticoagulation clinic.  She does have her own machine at home and does home testing.  She calls in her results to her pharmacist.  She had recent appointment and did decrease her dose for 1 day.  She is due for repeat INR in 2 weeks.

## 2020-10-05 NOTE — ASSESSMENT & PLAN NOTE
Chronic medical problem.  She is taking atenolol 50 mg twice a day.  She is on warfarin and is followed by the anticoagulation clinic.  She denies any chest pain, shortness of breath, palpitations, dizziness, blurry vision, headaches, bloody stools, hematuria, or bleeding gums.

## 2020-10-09 ENCOUNTER — ANTICOAGULATION MONITORING (OUTPATIENT)
Dept: VASCULAR LAB | Facility: MEDICAL CENTER | Age: 80
End: 2020-10-09

## 2020-10-09 DIAGNOSIS — Z79.01 CHRONIC ANTICOAGULATION: ICD-10-CM

## 2020-10-09 LAB — INR PPP: 2.7 (ref 2–3.5)

## 2020-10-09 NOTE — PROGRESS NOTES
Anticoagulation Summary  As of 10/9/2020    INR goal:  2.0-3.0   TTR:  73.3 % (5.2 y)   INR used for dosin.70 (10/9/2020)   Warfarin maintenance plan:  3.75 mg (2.5 mg x 1.5) every Fri; 2.5 mg (2.5 mg x 1) all other days   Weekly warfarin total:  18.75 mg   Plan last modified:  Vladimir Taylor PharmD (3/18/2020)   Next INR check:  10/23/2020   Target end date:  Indefinite    Indications    Atrial fibrillation (HCC) (Resolved) [I48.91]  Chronic anticoagulation [Z79.01]             Anticoagulation Episode Summary     INR check location:  Home Draw    Preferred lab:      Send INR reminders to:      Comments:  Winston YEAGER      Anticoagulation Care Providers     Provider Role Specialty Phone number    Lizzy Haywood M.D. Referring Cardiology [phone]    [facility] Responsible  [phone]    Vladimir Taylor, PharmD Responsible          Anticoagulation Patient Findings  Patient Findings     Negatives:  Signs/symptoms of thrombosis, Signs/symptoms of bleeding, Laboratory test error suspected, Change in health, Change in alcohol use, Change in activity, Upcoming invasive procedure, Emergency department visit, Upcoming dental procedure, Missed doses, Extra doses, Change in medications, Change in diet/appetite, Hospital admission, Bruising, Other complaints        Spoke with patient today regarding therapeutic INR of 2.7.  Patient denies any signs/symptoms of bruising or bleeding or any changes in diet and medications.  Instructed patient to call clinic with any questions or concerns.  Pt is to continue with current warfarin dosing regimen.  Follow up in 2 weeks, to reduce risk of adverse events related to this high risk medication,  Warfarin.    Vladimir Taylor, PharmD, BCACP

## 2020-10-12 DIAGNOSIS — R60.0 BILATERAL LOWER EXTREMITY EDEMA: ICD-10-CM

## 2020-10-12 RX ORDER — SPIRONOLACTONE 50 MG/1
50 TABLET, FILM COATED ORAL 2 TIMES DAILY
Qty: 180 TAB | Refills: 2 | Status: SHIPPED
Start: 2020-10-12 | End: 2021-05-07

## 2020-10-12 NOTE — TELEPHONE ENCOUNTER
Requested Prescriptions     Signed Prescriptions Disp Refills   • spironolactone (ALDACTONE) 50 MG Tab 180 Tab 2     Sig: Take 1 Tab by mouth 2 times a day.     Authorizing Provider: NORBERT PARADA A.P.R.N.

## 2020-10-12 NOTE — TELEPHONE ENCOUNTER
Received request via: Pharmacy    Was the patient seen in the last year in this department? Yes  10/05/2020    Does the patient have an active prescription (recently filled or refills available) for medication(s) requested? No

## 2020-10-23 ENCOUNTER — OFFICE VISIT (OUTPATIENT)
Dept: MEDICAL GROUP | Facility: PHYSICIAN GROUP | Age: 80
End: 2020-10-23
Payer: MEDICARE

## 2020-10-23 ENCOUNTER — ANTICOAGULATION MONITORING (OUTPATIENT)
Dept: VASCULAR LAB | Facility: MEDICAL CENTER | Age: 80
End: 2020-10-23

## 2020-10-23 VITALS
TEMPERATURE: 97.4 F | HEIGHT: 64 IN | DIASTOLIC BLOOD PRESSURE: 64 MMHG | WEIGHT: 182 LBS | HEART RATE: 100 BPM | SYSTOLIC BLOOD PRESSURE: 124 MMHG | OXYGEN SATURATION: 93 % | BODY MASS INDEX: 31.07 KG/M2 | RESPIRATION RATE: 16 BRPM

## 2020-10-23 DIAGNOSIS — Z79.01 CHRONIC ANTICOAGULATION: ICD-10-CM

## 2020-10-23 DIAGNOSIS — L89.302 PRESSURE INJURY OF BUTTOCK, STAGE 2, UNSPECIFIED LATERALITY (HCC): ICD-10-CM

## 2020-10-23 LAB — INR PPP: 2.5 (ref 2–3.5)

## 2020-10-23 PROCEDURE — 99213 OFFICE O/P EST LOW 20 MIN: CPT | Performed by: NURSE PRACTITIONER

## 2020-10-23 ASSESSMENT — FIBROSIS 4 INDEX: FIB4 SCORE: 1.32

## 2020-10-23 NOTE — PROGRESS NOTES
Anticoagulation Summary  As of 10/23/2020    INR goal:  2.0-3.0   TTR:  73.5 % (5.3 y)   INR used for dosin.50 (10/23/2020)   Warfarin maintenance plan:  3.75 mg (2.5 mg x 1.5) every Fri; 2.5 mg (2.5 mg x 1) all other days   Weekly warfarin total:  18.75 mg   Plan last modified:  Vladimir Taylor, PharmD (3/18/2020)   Next INR check:  2020   Target end date:  Indefinite    Indications    Atrial fibrillation (HCC) (Resolved) [I48.91]  Chronic anticoagulation [Z79.01]             Anticoagulation Episode Summary     INR check location:  Home Draw    Preferred lab:      Send INR reminders to:      Comments:  Winston YEAGER      Anticoagulation Care Providers     Provider Role Specialty Phone number    Lizzy Haywood M.D. Referring Cardiology [phone]    [facility] Anticoagulation [facility] Responsible  [phone]    Vladimir Taylor, PharmD Responsible          Anticoagulation Patient Findings          Left voicemail message to report a  therapeutic INR of 2.5.  Pt to continue with current warfarin dosing regimen. Requested pt contact the clinic for any s/s of unusual bleeding, bruising, clotting or any changes to diet or medication.  Follow up in 2 weeks, to reduce the risk of adverse events related to this high risk medication, warfarin.    Massiel Coleman, Clinical Pharmacist

## 2020-10-23 NOTE — ASSESSMENT & PLAN NOTE
Chronic medical problem.  She was previously followed by wound care but was discharged from their services on 9/8/2020.  She was told to continue with the zinc barrier paste, nystatin, and repositioned frequently. She is changing her positions 3-4 times a day. She uses the restroom 6 times a day.  Her  and her are here today states that her wound has worsened.  She is not having any fevers, chills, chest pain, shortness of breath, nausea, vomiting.

## 2020-10-23 NOTE — PROGRESS NOTES
"Subjective:     CC: wound     HPI:   Donte presents today with her  for the following:    Pressure injury of buttock, stage 2 (HCC)  Chronic medical problem.  She was previously followed by wound care but was discharged from their services on 9/8/2020.  She was told to continue with the zinc barrier paste, nystatin, and repositioned frequently. She is changing her positions 3-4 times a day. She uses the restroom 6 times a day.  Her  and her are here today states that her wound has worsened.  She is not having any fevers, chills, chest pain, shortness of breath, nausea, vomiting.      Past Medical History:   Diagnosis Date   • A-fib ([facility])    • Anesthesia     \"Tachycardia for 5 days after cataract surgery\"   • Anticoagulant long-term use 1/12/2012   • Arthritis     osteo-Knees, hips   • Atrial fibrillation ([facility])    • Backpain     R hip   • Bowel habit changes     diarrhea   • Breath shortness     with exertion, prn O2 2L   • Bronchitis Nov, 2013   • CAD (coronary artery disease)    • Depression    • Glaucoma 5/3/2011   • Hematoma complicating a procedure 11/3/2012   • Hemorrhagic disorder ([facility])     bruising/coumadin   • Hypertension    • Hypothyroid    • Lupus (HCC)    • Macular degeneration    • Menopause 1/12/2012   • Mitral regurgitation 10/30/2012   • Obesity 1/12/2012   • Pacemaker 2018   • Pneumonia feb,2013   • Pre-syncope 6/29/2018   • Pulmonary hypertension (HCC) 10/30/2012   • PVC's (premature ventricular contractions) 1/12/2012   • Senile nuclear sclerosis    • Spinal stenosis of lumbar region at multiple levels    • Unspecified cataract     repaired bilateral   • Unspecified urinary incontinence    • Urinary bladder disorder        Social History     Tobacco Use   • Smoking status: Never Smoker   • Smokeless tobacco: Never Used   Substance Use Topics   • Alcohol use: No   • Drug use: No       Current Outpatient Medications Ordered in Epic   Medication Sig Dispense Refill   • spironolactone " (ALDACTONE) 50 MG Tab Take 1 Tab by mouth 2 times a day. 180 Tab 2   • magnesium oxide (MAG-OX) 400 MG Tab tablet      • Wound Dressings (TRIAD HYDROPHILIC WOUND DRESSI) Paste 1 Application by Apply externally route 2 times a day. 71 g 2   • nystatin (MYCOSTATIN) powder Apply 1 g to affected area(s) 4 times a day as needed. 15 g 1   • nystatin (MYCOSTATIN) powder APPLY UP TO 4 TIMES A DAY TO GROIN RASH AFTER WASHING WITH MILD SOAP AND WATER 30 g 0   • nitrofurantoin (MACROBID) 100 MG Cap      • lisinopril (PRINIVIL) 5 MG Tab Take 1 Tab by mouth every day. 100 Tab 3   • levothyroxine (SYNTHROID) 50 MCG Tab TAKE 1 TABLET BY MOUTH EVERY MORNING ON A EMPTY STOMACH 90 Tab 1   • atenolol (TENORMIN) 50 MG Tab Take 1 Tab by mouth 2 times a day. 200 Tab 3   • warfarin (COUMADIN) 2.5 MG Tab TAKE 1-1.5 TABS BY MOUTH EVERY DAY. AS DIRECTED BY THE [facility] 135 Tab 1   • oxybutynin SR (DITROPAN-XL) 5 MG TABLET SR 24 HR Take 5 mg by mouth every day.     • estradiol (ESTRACE) 2 MG Tab TAKE ONE TABLET BY MOUTH EVERY DAY 90 Tab 2   • sertraline (ZOLOFT) 50 MG Tab Take 1 Tab by mouth every day. 90 Tab 3   • Triamcinolone Acetonide 0.025 % Lotion 1 Squirt by Apply externally route 2 Times a Day. Apply to itching ears. 1 Bottle 1   • Mirabegron ER (MYRBETRIQ) 50 MG TABLET SR 24 HR Take 50 mg by mouth every day. 90 Tab 3   • ipratropium (ATROVENT) 0.03 % Solution SPRAY 2 SPRAYS IN NOSE EVERY 12 HOURS. 30 mL 3   • Acetaminophen 500 MG Cap Take 2 Caps by mouth 3 times a day.     • PREBIOTIC PRODUCT PO Take 2 Tabs by mouth every day.     • Lutein 20 MG Cap Take 1 Cap by mouth every day. 90 Cap 3   • vitamin D (CHOLECALCIFEROL) 1000 UNIT TABS Take 2,000 Units by mouth every day.       No current Epic-ordered facility-administered medications on file.        Allergies:  Amiodarone, Bactrim, Cipro xr, Metoprolol, Morphine, Phytoplex z-guard [petrolatum-zinc oxide], Pseudoephedrine, Qvar [beclomethasone dipropionate],  "Vibramycin, Atorvastatin calcium-polysorbate 80, Augmentin, Diltiazem, Flecainide, Keflex, Mucinex, Tramadol, Atorvastatin, and Tape    Health Maintenance: Due for AWV    ROS:  Gen: no fevers/chills, no changes in weight  Eyes: no changes in vision  ENT: no sore throat, no ear pain  Pulm: no sob, no cough  CV: no chest pain, no palpitations  GI: no nausea/vomiting, no diarrhea  : no dysuria, no urinary incontinence  MSk: no myalgias, no falls  Skin: no rash, + wound to bilateral buttocks  Neuro: no headaches, no dizziness    Objective:     Vital signs reviewed  Exam:  /64 (BP Location: Left arm, Patient Position: Sitting, BP Cuff Size: Adult)   Pulse 100   Temp 36.3 °C (97.4 °F) (Temporal)   Resp 16   Ht 1.626 m (5' 4\")   Wt 82.6 kg (182 lb)   LMP 01/01/1993   SpO2 93%   BMI 31.24 kg/m²  Body mass index is 31.24 kg/m².    Gen: Alert and oriented, No apparent distress.   present in exam room.  Eyes:   Lids normal. Glasses in place.   Neck: Neck is supple without lymphadenopathy.  Lungs: Normal effort, CTA bilaterally, no wheezes, rhonchi, or rales  CV: Regular rate and rhythm. No murmurs, rubs, or gallops.  Ext: No clubbing, cyanosis, edema. Using FWW.  Stage II medial bilateral pressure ulcer to buttocks.  Right buttock stage II pressure ulcer measuring 5 cm x  1 cm wide with yellow eschar. Right upper buttock has 2-3 small Stage 1 pressure ulcer, skin is blanchable.  Left buttock stage II pressure ulcer measuring 1 cm x 1.5 cm with yellow eschar.  Skin is blanchable.     A chaperone was offered to the patient during today's exam. Patient declined chaperone.      Assessment & Plan:     79 y.o. female with the following -     1. Pressure injury of buttock, stage 2, unspecified laterality (HCC)  Chronic unstable medical problem.  I did review the last wound picture from 9/8/2020 that wound care had uploaded into media.  I compared current wound to picture and current wound has worsened.  After " review, discussed with patient and  to continue with zinc barrier paste.  Discussed using generously to create barrier.  Discussed changing positions frequently while awake.  We did come up with the plan that she will set an alarm on her stove timer for every hour to remind her to change positions.  She will keep the area dry and change her incontinence pads frequently if they become wet or saturated.  Discussed that she may try hydrocolloid dressing to the area and to remove when soiled.  She will return to me in 1 week to see if frequent positioning and offloading the area has improved the wound.  If wound is not improved will refer back to wound care.  Red flags discussed with strict ER precautions.  She and her  verbalized understanding.  Monitor and follow.    Return in about 1 week (around 10/30/2020).    Please note that this dictation was created using voice recognition software. I have made every reasonable attempt to correct obvious errors, but I expect that there are errors of grammar and possibly content that I did not discover before finalizing the note.

## 2020-10-29 ENCOUNTER — OFFICE VISIT (OUTPATIENT)
Dept: MEDICAL GROUP | Facility: PHYSICIAN GROUP | Age: 80
End: 2020-10-29
Payer: MEDICARE

## 2020-10-29 VITALS
RESPIRATION RATE: 16 BRPM | WEIGHT: 195 LBS | DIASTOLIC BLOOD PRESSURE: 66 MMHG | SYSTOLIC BLOOD PRESSURE: 124 MMHG | HEART RATE: 90 BPM | TEMPERATURE: 97.5 F | OXYGEN SATURATION: 95 % | BODY MASS INDEX: 33.29 KG/M2 | HEIGHT: 64 IN

## 2020-10-29 DIAGNOSIS — L89.303 PRESSURE INJURY OF BUTTOCK, STAGE 3, UNSPECIFIED LATERALITY (HCC): ICD-10-CM

## 2020-10-29 PROCEDURE — 99213 OFFICE O/P EST LOW 20 MIN: CPT | Performed by: NURSE PRACTITIONER

## 2020-10-29 ASSESSMENT — FIBROSIS 4 INDEX: FIB4 SCORE: 1.32

## 2020-10-29 NOTE — PROGRESS NOTES
"Subjective:     CC: wound follow-up    HPI:   Donte presents today with her  for the following:    Pressure injury of buttock, stage 3 (HCC)  Chronic medical problem.  She has been changing position every 2 hours or less while awake.  At nighttime she sleeps in a chair due to chronic hip pain.  She does get up 1-2 times during the night to use the bathroom.  She has been using zinc barrier paste as well as [employer] lidocaine spray.  She denies any fever, chills, nausea, vomiting.  She would like a referral to return back to wound care.        Past Medical History:   Diagnosis Date   • A-fib ([facility])    • Anesthesia     \"Tachycardia for 5 days after cataract surgery\"   • Anticoagulant long-term use 1/12/2012   • Arthritis     osteo-Knees, hips   • Atrial fibrillation ([facility])    • Backpain     R hip   • Bowel habit changes     diarrhea   • Breath shortness     with exertion, prn O2 2L   • Bronchitis Nov, 2013   • CAD (coronary artery disease)    • Depression    • Glaucoma 5/3/2011   • Hematoma complicating a procedure 11/3/2012   • Hemorrhagic disorder ([facility])     bruising/coumadin   • Hypertension    • Hypothyroid    • Lupus ([facility])    • Macular degeneration    • Menopause 1/12/2012   • Mitral regurgitation 10/30/2012   • Obesity 1/12/2012   • Pacemaker 2018   • Pneumonia feb,2013   • Pre-syncope 6/29/2018   • Pulmonary hypertension ([facility]) 10/30/2012   • PVC's (premature ventricular contractions) 1/12/2012   • Senile nuclear sclerosis    • Spinal stenosis of lumbar region at multiple levels    • Unspecified cataract     repaired bilateral   • Unspecified urinary incontinence    • Urinary bladder disorder        Social History     Tobacco Use   • Smoking status: Never Smoker   • Smokeless tobacco: Never Used   Substance Use Topics   • Alcohol use: No   • Drug use: No       Current Outpatient Medications Ordered in Epic   Medication Sig Dispense Refill   • spironolactone (ALDACTONE) 50 MG Tab Take 1 Tab by mouth 2 times a " day. 180 Tab 2   • magnesium oxide (MAG-OX) 400 MG Tab tablet      • Wound Dressings (TRIAD HYDROPHILIC WOUND DRESSI) Paste 1 Application by Apply externally route 2 times a day. 71 g 2   • nystatin (MYCOSTATIN) powder Apply 1 g to affected area(s) 4 times a day as needed. 15 g 1   • nystatin (MYCOSTATIN) powder APPLY UP TO 4 TIMES A DAY TO GROIN RASH AFTER WASHING WITH MILD SOAP AND WATER 30 g 0   • nitrofurantoin (MACROBID) 100 MG Cap      • lisinopril (PRINIVIL) 5 MG Tab Take 1 Tab by mouth every day. 100 Tab 3   • levothyroxine (SYNTHROID) 50 MCG Tab TAKE 1 TABLET BY MOUTH EVERY MORNING ON A EMPTY STOMACH 90 Tab 1   • atenolol (TENORMIN) 50 MG Tab Take 1 Tab by mouth 2 times a day. 200 Tab 3   • warfarin (COUMADIN) 2.5 MG Tab TAKE 1-1.5 TABS BY MOUTH EVERY DAY. [facility] 135 Tab 1   • oxybutynin SR (DITROPAN-XL) 5 MG TABLET SR 24 HR Take 5 mg by mouth every day.     • estradiol (ESTRACE) 2 MG Tab TAKE ONE TABLET BY MOUTH EVERY DAY 90 Tab 2   • sertraline (ZOLOFT) 50 MG Tab Take 1 Tab by mouth every day. 90 Tab 3   • Triamcinolone Acetonide 0.025 % Lotion 1 Squirt by Apply externally route 2 Times a Day. Apply to itching ears. 1 Bottle 1   • Mirabegron ER (MYRBETRIQ) 50 MG TABLET SR 24 HR Take 50 mg by mouth every day. 90 Tab 3   • ipratropium (ATROVENT) 0.03 % Solution SPRAY 2 SPRAYS IN NOSE EVERY 12 HOURS. 30 mL 3   • Acetaminophen 500 MG Cap Take 2 Caps by mouth 3 times a day.     • PREBIOTIC PRODUCT PO Take 2 Tabs by mouth every day.     • Lutein 20 MG Cap Take 1 Cap by mouth every day. 90 Cap 3   • vitamin D (CHOLECALCIFEROL) 1000 UNIT TABS Take 2,000 Units by mouth every day.       No current Epic-ordered facility-administered medications on file.        Allergies:  Amiodarone, Bactrim, Cipro xr, Metoprolol, Morphine, Phytoplex z-guard [petrolatum-zinc oxide], Pseudoephedrine, Qvar [beclomethasone dipropionate], Vibramycin, Atorvastatin calcium-polysorbate 80,  "Augmentin, Diltiazem, Flecainide, Keflex, Mucinex, Tramadol, Atorvastatin, and Tape    Health Maintenance: due for AWV    ROS:  Gen: no fevers/chills, no changes in weight  Eyes: no changes in vision  ENT: no sore throat, no hearing loss, no bloody nose  Pulm: no sob, no cough  CV: no chest pain, no palpitations  GI: no nausea/vomiting, no diarrhea  : no dysuria  MSk: no myalgias  Skin: no rash, + chronic pressure ulcer to bilateral buttocks  Neuro: no headaches, no dizziness    Objective:     Vital signs reviewed  Exam:  /66 (BP Location: Right arm, Patient Position: Sitting, BP Cuff Size: Adult)   Pulse 90   Temp 36.4 °C (97.5 °F) (Temporal)   Resp 16   Ht 1.626 m (5' 4\")   Wt 88.5 kg (195 lb)   LMP 01/01/1993   SpO2 95%   BMI 33.47 kg/m²  Body mass index is 33.47 kg/m².    Gen: Alert and oriented, No apparent distress.  present in exam room.  Eyes:   Lids normal. Glasses in place.   Neck: Neck is supple without lymphadenopathy.  Lungs: Normal effort, CTA bilaterally, no wheezes, rhonchi, or rales  CV: Regular rate and rhythm. No murmurs, rubs, or gallops.  Ext: No clubbing, cyanosis, edema.  Skin:    Using FWW.  Stage III medial bilateral pressure ulcer to buttocks.  Right buttock stage II pressure ulcer measuring 4.5 cm x  1 cm wide with yellow eschar. Skin is blanchable.  Left buttock stage II pressure ulcer measuring 1 cm x 1.5 cm with yellow eschar.  Skin is blanchable.       Assessment & Plan:     79 y.o. female with the following -     1. Pressure injury of buttock, stage 3, unspecified laterality (HCC)  Chronic unstable medical problem.  Wound has not increased in size but is not improving.  Will place referral back to wound clinic.  Discussed with patient to continue frequent position changes especially while awake.  She verbalized understanding.  Continue with barrier paste.  Red flags discussed.  Monitor and follow.  - REFERRAL TO WOUND CLINIC      Return in about 3 months (around " 1/29/2021) for wound follow-up.    Please note that this dictation was created using voice recognition software. I have made every reasonable attempt to correct obvious errors, but I expect that there are errors of grammar and possibly content that I did not discover before finalizing the note.

## 2020-10-29 NOTE — ASSESSMENT & PLAN NOTE
Chronic medical problem.  She has been changing position every 2 hours or less while awake.  At nighttime she sleeps in a chair due to chronic hip pain.  She does get up 1-2 times during the night to use the bathroom.  She has been using zinc barrier paste as well as [employer] lidocaine spray.  She denies any fever, chills, nausea, vomiting.  She would like a referral to return back to wound care.

## 2020-11-02 RX ORDER — ESTRADIOL 2 MG/1
2 TABLET ORAL
Qty: 90 TAB | Refills: 2 | Status: SHIPPED | OUTPATIENT
Start: 2020-11-02 | End: 2021-08-02

## 2020-11-02 NOTE — TELEPHONE ENCOUNTER
Requested Prescriptions     Signed Prescriptions Disp Refills   • estradiol (ESTRACE) 2 MG Tab 90 Tab 2     Sig: Take 1 Tab by mouth every day.     Authorizing Provider: NORBERT PARADA A.P.R.N.

## 2020-11-02 NOTE — TELEPHONE ENCOUNTER
Received request via: Pharmacy    Was the patient seen in the last year in this department? Yes LOV 10/29/2020    Does the patient have an active prescription (recently filled or refills available) for medication(s) requested? No

## 2020-11-04 ENCOUNTER — NON-PROVIDER VISIT (OUTPATIENT)
Dept: CARDIOLOGY | Facility: MEDICAL CENTER | Age: 80
End: 2020-11-04
Payer: MEDICARE

## 2020-11-04 ENCOUNTER — OFFICE VISIT (OUTPATIENT)
Dept: CARDIOLOGY | Facility: MEDICAL CENTER | Age: 80
End: 2020-11-04
Payer: MEDICARE

## 2020-11-04 VITALS
OXYGEN SATURATION: 95 % | BODY MASS INDEX: 32.58 KG/M2 | HEART RATE: 80 BPM | HEIGHT: 64 IN | SYSTOLIC BLOOD PRESSURE: 124 MMHG | DIASTOLIC BLOOD PRESSURE: 60 MMHG | WEIGHT: 190.8 LBS

## 2020-11-04 DIAGNOSIS — L89.303 PRESSURE INJURY OF BUTTOCK, STAGE 3, UNSPECIFIED LATERALITY (HCC): ICD-10-CM

## 2020-11-04 DIAGNOSIS — M48.00 SPINAL STENOSIS, MULTILEVEL: ICD-10-CM

## 2020-11-04 DIAGNOSIS — I10 ESSENTIAL HYPERTENSION: ICD-10-CM

## 2020-11-04 DIAGNOSIS — I48.19 PERSISTENT ATRIAL FIBRILLATION (HCC): ICD-10-CM

## 2020-11-04 DIAGNOSIS — Z95.0 CARDIAC PACEMAKER IN SITU: ICD-10-CM

## 2020-11-04 DIAGNOSIS — N18.32 STAGE 3B CHRONIC KIDNEY DISEASE: ICD-10-CM

## 2020-11-04 DIAGNOSIS — I25.10 CORONARY ARTERY DISEASE INVOLVING NATIVE CORONARY ARTERY OF NATIVE HEART WITHOUT ANGINA PECTORIS: ICD-10-CM

## 2020-11-04 DIAGNOSIS — R60.9 PERIPHERAL EDEMA: ICD-10-CM

## 2020-11-04 DIAGNOSIS — Z79.899 HIGH RISK MEDICATION USE: ICD-10-CM

## 2020-11-04 DIAGNOSIS — Z79.01 CHRONIC ANTICOAGULATION: ICD-10-CM

## 2020-11-04 PROCEDURE — 93280 PM DEVICE PROGR EVAL DUAL: CPT | Performed by: INTERNAL MEDICINE

## 2020-11-04 PROCEDURE — 99214 OFFICE O/P EST MOD 30 MIN: CPT | Mod: 25 | Performed by: INTERNAL MEDICINE

## 2020-11-04 ASSESSMENT — ENCOUNTER SYMPTOMS
COUGH: 0
WEAKNESS: 0
BRUISES/BLEEDS EASILY: 0
CARDIOVASCULAR NEGATIVE: 1
FEVER: 0
ORTHOPNEA: 0
STRIDOR: 0
SHORTNESS OF BREATH: 1
GASTROINTESTINAL NEGATIVE: 1
CLAUDICATION: 0
EYES NEGATIVE: 1
SPUTUM PRODUCTION: 0
CONSTITUTIONAL NEGATIVE: 1
SORE THROAT: 0
DIZZINESS: 0
NEUROLOGICAL NEGATIVE: 1
PND: 0
PALPITATIONS: 0
LOSS OF CONSCIOUSNESS: 0
MUSCULOSKELETAL NEGATIVE: 1
WHEEZING: 0
HEMOPTYSIS: 0
CHILLS: 0

## 2020-11-04 ASSESSMENT — FIBROSIS 4 INDEX: FIB4 SCORE: 1.32

## 2020-11-04 NOTE — PROGRESS NOTES
"Chief Complaint   Patient presents with   • Coronary Artery Disease     & Cardiac pacemaker in situ       Subjective:   Donte Magaña is a 79 y.o. female who presents today as a follow-up for her atrial fibrillation sick sinus syndrome status post pacemaker hypertension hyperlipidemia.  She had her pacemaker check today that showed 99% V pacing with no runs of atrial fibrillation.   She was last seen she continues to have shortness of breath.  Her pacemaker check today showed 100% V pacing.  Her most recent labs did show a borderline elevated potassium of 5.3.  She takes low-dose lisinopril and spironolactone 50 twice daily.  She otherwise been doing well.  Her lower extremity edema is stable when she is satisfied with where it is.    Past Medical History:   Diagnosis Date   • A-fib (HCC)    • Anesthesia     \"Tachycardia for 5 days after cataract surgery\"   • Anticoagulant long-term use 1/12/2012   • Arthritis     osteo-Knees, hips   • Atrial fibrillation (HCC)    • Backpain     R hip   • Bowel habit changes     diarrhea   • Breath shortness     with exertion, prn O2 2L   • Bronchitis Nov, 2013   • CAD (coronary artery disease)    • Depression    • Glaucoma 5/3/2011   • Hematoma complicating a procedure 11/3/2012   • Hemorrhagic disorder (HCC)     bruising/coumadin   • Hypertension    • Hypothyroid    • Lupus (HCC)    • Macular degeneration    • Menopause 1/12/2012   • Mitral regurgitation 10/30/2012   • Obesity 1/12/2012   • Pacemaker 2018   • Pneumonia feb,2013   • Pre-syncope 6/29/2018   • Pulmonary hypertension (HCC) 10/30/2012   • PVC's (premature ventricular contractions) 1/12/2012   • Senile nuclear sclerosis    • Spinal stenosis of lumbar region at multiple levels    • Unspecified cataract     repaired bilateral   • Unspecified urinary incontinence    • Urinary bladder disorder      Past Surgical History:   Procedure Laterality Date   • PB COMBINED ANT/POST COLPORRHAPHY  1/14/2020    Procedure: " COLPORRHAPHY, COMBINED ANTEROPOSTERIOR - PERINEOPLASTY;  Surgeon: Waqas Robin M.D.;  Location: SURGERY SAME DAY [facility];  Service: Gynecology   • PB LAP,DIAGNOSTIC ABDOMEN  1/14/2020    Procedure: PELVISCOPY;  Surgeon: Waqas Robin M.D.;  Location: SURGERY SAME DAY [facility];  Service: Gynecology   • ENTEROCELE REPAIR  1/14/2020    Procedure: REPAIR, ENTEROCELE;  Surgeon: Waqas Robin M.D.;  Location: SURGERY SAME DAY [facility];  Service: Gynecology   • BLADDER SLING FEMALE  1/14/2020    Procedure: BLADDER SLING, FEMALE - TOT;  Surgeon: Waqas Robin M.D.;  Location: SURGERY SAME DAY [facility];  Service: Gynecology   • VAGINAL SUSPENSION  1/14/2020    Procedure: COLPOPEXY - SACROSPINOUS VAULT SUSPENSION;  Surgeon: Waqas Robin M.D.;  Location: SURGERY SAME DAY [facility];  Service: Gynecology   • SALPINGO OOPHORECTOMY Bilateral 1/14/2020    Procedure: SALPINGO-OOPHORECTOMY;  Surgeon: Waqas Robin M.D.;  Location: SURGERY SAME DAY [facility];  Service: Gynecology   • IRRIGATION & DEBRIDEMENT ORTHO Right 2/14/2019    Procedure: IRRIGATION & DEBRIDEMENT ORTHO-HIP WOUND ;  Surgeon: Vladimir Lee M.D.;  Location: [facility];  Service: Orthopedics   • HIP ARTH ANTERIOR TOTAL Right 1/17/2019    Procedure: HIP ARTHROPLASTY ANTERIOR TOTAL;  Surgeon: Juan C Mercedes M.D.;  Location: [facility];  Service: Orthopedics   • PACEMAKER INSERTION  06/30/2018    Dual Chamber   • KNEE ARTHROPLASTY TOTAL Right 6/23/2016    Procedure: KNEE ARTHROPLASTY TOTAL;  Surgeon: Heriberto Lozada M.D.;  Location: [facility];  Service:    • KNEE ARTHROPLASTY TOTAL Left 5/28/2015    Procedure: KNEE ARTHROPLASTY TOTAL;  Surgeon: Heriberto Lozada M.D.;  Location: [facility];  Service:    • CATARACT PHACO WITH IOL Right 5/5/2015    Procedure: IOL OD - STANDARD;  Surgeon: Dmitry Bejarano M.D.;  Location: [facility];  Service:    • CATARACT  PHACO WITH IOL  4/21/2015    Performed by Dmitry Bejarano M.D. at [facility]   • RECOVERY  11/30/2010    Performed by SURGERY, CATH-RECOVERY at [facility]GERY [facility]   • COLONOSCOPY  2008    Normal    [facility]   • ABDOMINAL HYSTERECTOMY TOTAL  April 15,1975    still has ovaries   • OPEN REDUCTION      left ankle   • OTHER      Removed pins from left ankle   • OTHER CARDIAC SURGERY  12/2017 and 07/19/2018     Cardiac Ablation   • TONSILLECTOMY AND ADENOIDECTOMY       Family History   Problem Relation Age of Onset   • Stroke Mother    • Diabetes Father    • Stroke Sister    • Heart Disease Brother    • Stroke Sister    • GI Disease Daughter         Crohn's Disease   • Heart Disease Daughter         CHF   • Other Daughter         Chronic Pain--Lymphedema   • Cancer Paternal Aunt         breast     Social History     Socioeconomic History   • Marital status:      Spouse name: Not on file   • Number of children: Not on file   • Years of education: Not on file   • Highest education level: Not on file   Occupational History   • Not on file   Social Needs   • Financial resource strain: Not on file   • Food insecurity     Worry: Not on file     Inability: Not on file   • Transportation needs     Medical: Not on file     Non-medical: Not on file   Tobacco Use   • Smoking status: Never Smoker   • Smokeless tobacco: Never Used   Substance and Sexual Activity   • Alcohol use: No   • Drug use: No   • Sexual activity: Not Currently     Partners: Male     Birth control/protection: Post-Menopausal   Lifestyle   • Physical activity     Days per week: Not on file     Minutes per session: Not on file   • Stress: Not on file   Relationships   • Social connections     Talks on phone: Not on file     Gets together: Not on file     Attends Mu-ism service: Not on file     Active member of club or organization: Not on file     Attends meetings of clubs or organizations: Not on file     Relationship  "status: Not on file   • Intimate partner violence     Fear of current or ex partner: Not on file     Emotionally abused: Not on file     Physically abused: Not on file     Forced sexual activity: Not on file   Other Topics Concern   • Not on file   Social History Narrative   • Not on file     Allergies   Allergen Reactions   • Amiodarone Hives     Throat and tongue itching   • Bactrim Shortness of Breath   • Cipro Xr Swelling   • Metoprolol Swelling     Causes throat swelling   • Morphine Unspecified     Hallucinations   • Phytoplex Z-Guard [Petrolatum-Zinc Oxide] Unspecified     \"causes burning\"   • Pseudoephedrine Palpitations   • Qvar [Beclomethasone Dipropionate] Unspecified     Pressure on heart     • Vibramycin Shortness of Breath   • Atorvastatin Calcium-Polysorbate 80 Unspecified     Muscle aches     • Augmentin Unspecified     Unknown reaction   • Diltiazem Rash     rash   • Flecainide Unspecified     dizziness   • Keflex Unspecified     Pt states \"Unsure\".   • Mucinex Unspecified     GI Distress     • Tramadol Unspecified     crying   • Atorvastatin Myalgia   • Tape Rash     Paper tape okay     Outpatient Encounter Medications as of 11/4/2020   Medication Sig Dispense Refill   • estradiol (ESTRACE) 2 MG Tab Take 1 Tab by mouth every day. 90 Tab 2   • spironolactone (ALDACTONE) 50 MG Tab Take 1 Tab by mouth 2 times a day. 180 Tab 2   • magnesium oxide (MAG-OX) 400 MG Tab tablet      • Wound Dressings (TRIAD HYDROPHILIC WOUND DRESSI) Paste 1 Application by Apply externally route 2 times a day. 71 g 2   • nystatin (MYCOSTATIN) powder APPLY UP TO 4 TIMES A DAY TO GROIN RASH AFTER WASHING WITH MILD SOAP AND WATER 30 g 0   • nitrofurantoin (MACROBID) 100 MG Cap      • lisinopril (PRINIVIL) 5 MG Tab Take 1 Tab by mouth every day. 100 Tab 3   • levothyroxine (SYNTHROID) 50 MCG Tab TAKE 1 TABLET BY MOUTH EVERY MORNING ON A EMPTY STOMACH 90 Tab 1   • atenolol (TENORMIN) 50 MG Tab Take 1 Tab by mouth 2 times a day. " "200 Tab 3   • warfarin (COUMADIN) 2.5 MG Tab TAKE 1-1.5 TABS BY MOUTH EVERY DAY. AS DIRECTED BY THE [facility] 135 Tab 1   • oxybutynin SR (DITROPAN-XL) 5 MG TABLET SR 24 HR Take 5 mg by mouth every day.     • sertraline (ZOLOFT) 50 MG Tab Take 1 Tab by mouth every day. 90 Tab 3   • Triamcinolone Acetonide 0.025 % Lotion 1 Squirt by Apply externally route 2 Times a Day. Apply to itching ears. 1 Bottle 1   • Mirabegron ER (MYRBETRIQ) 50 MG TABLET SR 24 HR Take 50 mg by mouth every day. 90 Tab 3   • ipratropium (ATROVENT) 0.03 % Solution SPRAY 2 SPRAYS IN NOSE EVERY 12 HOURS. 30 mL 3   • Acetaminophen 500 MG Cap Take 2 Caps by mouth 3 times a day.     • PREBIOTIC PRODUCT PO Take 2 Tabs by mouth every day.     • Lutein 20 MG Cap Take 1 Cap by mouth every day. 90 Cap 3   • vitamin D (CHOLECALCIFEROL) 1000 UNIT TABS Take 2,000 Units by mouth every day.     • nystatin (MYCOSTATIN) powder Apply 1 g to affected area(s) 4 times a day as needed. (Patient not taking: Reported on 11/4/2020) 15 g 1     No facility-administered encounter medications on file as of 11/4/2020.      Review of Systems   Constitutional: Negative.  Negative for chills, fever and malaise/fatigue.   HENT: Negative.  Negative for sore throat.    Eyes: Negative.    Respiratory: Positive for shortness of breath. Negative for cough, hemoptysis, sputum production, wheezing and stridor.    Cardiovascular: Negative.  Negative for chest pain, palpitations, orthopnea, claudication, leg swelling and PND.   Gastrointestinal: Negative.    Genitourinary: Negative.    Musculoskeletal: Negative.    Skin: Negative.    Neurological: Negative.  Negative for dizziness, loss of consciousness and weakness.   Endo/Heme/Allergies: Negative.  Does not bruise/bleed easily.   All other systems reviewed and are negative.       Objective:   /60 (BP Location: Left arm, Patient Position: Sitting, BP Cuff Size: Adult)   Pulse 80   Ht 1.626 m (5' 4\")   Wt " 86.5 kg (190 lb 12.8 oz)   LMP 01/01/1993   SpO2 95%   BMI 32.75 kg/m²     Physical Exam   Constitutional: She appears well-developed and well-nourished. No distress.   HENT:   Head: Normocephalic and atraumatic.   Right Ear: External ear normal.   Left Ear: External ear normal.   Nose: Nose normal.   Mouth/Throat: No oropharyngeal exudate.   Eyes: Pupils are equal, round, and reactive to light. Conjunctivae and EOM are normal. Right eye exhibits no discharge. Left eye exhibits no discharge. No scleral icterus.   Neck: Neck supple. No JVD present.   Cardiovascular: Normal rate, regular rhythm and intact distal pulses. Exam reveals no gallop and no friction rub.   No murmur heard.  Pulmonary/Chest: Effort normal. No stridor. No respiratory distress. She has no wheezes. She has no rales. She exhibits no tenderness.   Abdominal: Soft. She exhibits no distension. There is no guarding.   Musculoskeletal: Normal range of motion.         General: No tenderness, deformity or edema.   Neurological: She is alert. She has normal reflexes. She displays normal reflexes. No cranial nerve deficit. She exhibits normal muscle tone. Coordination normal.   Skin: Skin is warm and dry. No rash noted. She is not diaphoretic. No erythema. No pallor.   Psychiatric: She has a normal mood and affect. Her behavior is normal. Judgment and thought content normal.   Nursing note and vitals reviewed.      Assessment:     1. Cardiac pacemaker in situ     2. Chronic anticoagulation     3. Coronary artery disease involving native coronary artery of native heart without angina pectoris     4. Essential hypertension  Comp Metabolic Panel   5. High risk medication use  Comp Metabolic Panel   6. Persistent atrial fibrillation (HCC)     7. Peripheral edema     8. Pressure injury of buttock, stage 3, unspecified laterality (HCC)     9. Spinal stenosis, multilevel  Comp Metabolic Panel   10. Stage 3b chronic kidney disease  Comp Metabolic Panel        Medical Decision Making:  Today's Assessment / Status / Plan:     79-year-old female with sick sinus syndrome status post pacemaker doing well.  We will keep her on the atenolol for her atrial fibrillation.  For her high blood pressure still stay on lisinopril.  For her lower extremity edema she will stay on the spironolactone.  For her borderline elevated potassium we will recheck a CMP.  If this continues to be elevated we will discuss reducing the spironolactone and switching to torsemide.  Assuming this is normal we will see her back in 6 months.  We will keep her on the warfarin for her atrial fibrillation.  Her labs are appropriate for her high risk medication usage.  We will see her back in 6 months.

## 2020-11-05 ENCOUNTER — TELEPHONE (OUTPATIENT)
Dept: VASCULAR LAB | Facility: MEDICAL CENTER | Age: 80
End: 2020-11-05

## 2020-11-05 ENCOUNTER — OFFICE VISIT (OUTPATIENT)
Dept: WOUND CARE | Facility: MEDICAL CENTER | Age: 80
End: 2020-11-05
Attending: NURSE PRACTITIONER
Payer: MEDICARE

## 2020-11-05 VITALS
TEMPERATURE: 97.1 F | OXYGEN SATURATION: 98 % | HEART RATE: 89 BPM | RESPIRATION RATE: 20 BRPM | DIASTOLIC BLOOD PRESSURE: 67 MMHG | SYSTOLIC BLOOD PRESSURE: 126 MMHG

## 2020-11-05 DIAGNOSIS — E66.09 OBESITY DUE TO EXCESS CALORIES, UNSPECIFIED CLASSIFICATION, UNSPECIFIED WHETHER SERIOUS COMORBIDITY PRESENT: ICD-10-CM

## 2020-11-05 DIAGNOSIS — R54 AGE-RELATED PHYSICAL DEBILITY: ICD-10-CM

## 2020-11-05 DIAGNOSIS — L89.303 PRESSURE INJURY OF BUTTOCK, STAGE 3, UNSPECIFIED LATERALITY (HCC): Primary | ICD-10-CM

## 2020-11-05 PROCEDURE — 11042 DBRDMT SUBQ TIS 1ST 20SQCM/<: CPT | Performed by: NURSE PRACTITIONER

## 2020-11-05 PROCEDURE — 11042 DBRDMT SUBQ TIS 1ST 20SQCM/<: CPT

## 2020-11-05 PROCEDURE — 99213 OFFICE O/P EST LOW 20 MIN: CPT | Mod: 25 | Performed by: NURSE PRACTITIONER

## 2020-11-05 PROCEDURE — 99213 OFFICE O/P EST LOW 20 MIN: CPT

## 2020-11-05 ASSESSMENT — ENCOUNTER SYMPTOMS
CLAUDICATION: 0
CHILLS: 0
NAUSEA: 0
SHORTNESS OF BREATH: 0
CONSTIPATION: 0
BACK PAIN: 1
VOMITING: 0
DIARRHEA: 0
NERVOUS/ANXIOUS: 0
FEVER: 0
DEPRESSION: 0
COUGH: 0

## 2020-11-05 ASSESSMENT — PAIN SCALES - GENERAL: PAINLEVEL: 7=MODERATE-SEVERE PAIN

## 2020-11-05 NOTE — PROGRESS NOTES
Provider Encounter- Pressure Injury    HISTORY OF PRESENT ILLNESS                              START OF CARE IN CLINIC: 11/5/2020               REFERRING PROVIDER: VICTORIANO Montes                 WOUND-pressure ulcer              LOCATION: Patient has bilateral stage III pressure ulcers to her buttocks                                  Shallow stage III, left buttock, mirroring each right buttock wound (kissing  wounds)              WOUND HISTORY: Patient is an 78 year old female who has had wounds to her buttocks off and on since December 2018. She is unsure of how the wounds started, but does state that she has a bad back and is in her recliner chair day and night.  She does get up several times per day to go to the bathroom, but otherwise does not ambulate much.  She also has problems with frequent urinary incontinence, wears absorptive pads.  Her  brings her food, does all the cooking, cleaning and shopping.  She was seen previously for these wounds in the clinic, discharged most recently in June 2020 due to healing.  She has a Roho cushion for offloading for her chair.     11/5/2020: Patient is well-known to [facility] from prior referral.  Patient returns with bilateral stage III pressure ulcers.  Patient has been reports that the patient has been offloading as advised by her PCP she is walking wound every hour.  Patient is offloading with Roho cushion they brought off the Internet.  Patient however due to back pain cannot lay flat that has not laid in bed for years uses a recliner this is the cause of friction and shear and pressure to these wound areas.                  PERTINENT PMH: Obesity, limited mobility, chronic back pain, CAD, bilateral knee replacements, right hip replacement    Contributing factors: Immobility -yes.  Activity level: -Sedentary.  Support surfaces: -Waffle cushion to chair.  Incontinence: No.  Nutritional state: Well-nourished. Caregiver assistance: . Obesity:  Yes. Moisture: Yes      DIABETES: No    TOBACCO USE: She has never smoked or used tobacco products      Interval History:  11/5/2020: Clinic visit with PHYLLIS Marie. Patient states that they are feeling well today.  Patient denies fever, chills, nausea, vomiting, lightheadedness, dizziness, shortness of breath and chest pain.  Bilateral stage III pressure ulcers to buttocks debrided in clinic today.  Patient given prescription to [employer] to evaluate for offloading cushion.  Patient has purchased a Roho cushion from the Internet suggested that the patient take the Roho cushion back to [facility] for proper inflation and to determine if it actually is the correct offloading cushion for this patient.     REVIEW OF SYSTEMS:   Review of Systems   Constitutional: Negative for chills and fever.   Respiratory: Negative for cough and shortness of breath.         Shortness of breath with exertion   Cardiovascular: Positive for leg swelling. Negative for chest pain and claudication.   Gastrointestinal: Negative for constipation, diarrhea, nausea and vomiting.   Genitourinary:        Frequent incontinence of urine  Occasional incontinence of stool  Wears absorptive pad   Musculoskeletal: Positive for back pain and joint pain.        Chronic back and hip pain, debilitating   Psychiatric/Behavioral: Negative for depression. The patient is not nervous/anxious.        PHYSICAL EXAMINATION:     Physical Exam   Constitutional: She is oriented to person, place, and time.   Obese elderly female   HENT:   Head: Normocephalic.   Eyes: Pupils are equal, round, and reactive to light.   Pulmonary/Chest: Effort normal.   Musculoskeletal:         General: Edema present.      Comments: Generalized dependent bilateral lower extremity edema  Uses walker for ambulation   Neurological: She is alert and oriented to person, place, and time.   Skin: Skin is warm.   Bilateral pressure ulcers to buttocks stage III  See doc flowsheets and  photos   Psychiatric: Mood, memory, affect and judgment normal.     Wound Assessment           Wound 11/05/20 Full Thickness Wound Buttocks Inner Left -Left Inner Buttock (Active)   Wound Image    11/05/20 0800   Site Assessment Pink;Yellow 11/05/20 0800   Periwound Assessment Dry 11/05/20 0800   Margins Attached edges 11/05/20 0800   Drainage Amount KESHA 11/05/20 0800   Drainage Description KESHA 11/05/20 0800   Treatments Cleansed;Topical Lidocaine;Provider debridement 11/05/20 0800   Wound Cleansing Normal Saline Irrigation 11/05/20 0800   Periwound Protectant Skin Protectant Wipes to Periwound 11/05/20 0800   Dressing Options Honey Colloid;Telfa;Hypafix Tape 11/05/20 0800   Dressing Changed New 11/05/20 0800   Non-staged Wound Description Full thickness 11/05/20 0800   Wound Length (cm) 1.5 cm 11/05/20 0800   Wound Width (cm) 1 cm 11/05/20 0800   Wound Depth (cm) 0.1 cm 11/05/20 0800   Wound Surface Area (cm^2) 1.5 cm^2 11/05/20 0800   Wound Volume (cm^3) 0.15 cm^3 11/05/20 0800   Post-Procedure Length (cm) 1.9 cm 11/05/20 0800   Post-Procedure Width (cm) 1 cm 11/05/20 0800   Post-Procedure Depth (cm) 0.1 cm 11/05/20 0800   Post-Procedure Surface Area (cm^2) 1.9 cm^2 11/05/20 0800   Post-Procedure Volume (cm^3) 0.19 cm^3 11/05/20 0800   Wound Bed Epithelium (%) 0 % 11/05/20 0800   Wound Bed Slough (%) 40 % 11/05/20 0800   Wound Bed Eschar (%) 0 % 11/05/20 0800   Tunneling (cm) 0 cm 11/05/20 0800   Undermining (cm) 0 cm 11/05/20 0800   Wound Odor None 11/05/20 0800   Exposed Structures None 11/05/20 0800       Wound 11/05/20 Full Thickness Wound Buttocks Inner Right -Right Inner Buttock (Active)   Wound Image    11/05/20 0800   Site Assessment Pink;Yellow 11/05/20 0800   Periwound Assessment Dry 11/05/20 0800   Margins Attached edges 11/05/20 0800   Drainage Amount KESHA 11/05/20 0800   Drainage Description KESHA 11/05/20 0800   Treatments Cleansed;Topical Lidocaine;Provider debridement 11/05/20 0800   Wound Cleansing  Normal Saline Irrigation 11/05/20 0800   Periwound Protectant Skin Protectant Wipes to Periwound 11/05/20 0800   Dressing Options Honey Colloid;Telfa;Hypafix Tape 11/05/20 0800   Dressing Changed New 11/05/20 0800   Non-staged Wound Description Full thickness 11/05/20 0800   Wound Length (cm) 4.5 cm 11/05/20 0800   Wound Width (cm) 1.5 cm 11/05/20 0800   Wound Depth (cm) 0.1 cm 11/05/20 0800   Wound Surface Area (cm^2) 6.75 cm^2 11/05/20 0800   Wound Volume (cm^3) 0.68 cm^3 11/05/20 0800   Post-Procedure Length (cm) 4.5 cm 11/05/20 0800   Post-Procedure Width (cm) 1.7 cm 11/05/20 0800   Post-Procedure Depth (cm) 0.2 cm 11/05/20 0800   Post-Procedure Surface Area (cm^2) 7.65 cm^2 11/05/20 0800   Post-Procedure Volume (cm^3) 1.53 cm^3 11/05/20 0800   Wound Bed Epithelium (%) 0 % 11/05/20 0800   Wound Bed Slough (%) 50 % 11/05/20 0800   Wound Bed Eschar (%) 0 % 11/05/20 0800   Tunneling (cm) 0 cm 11/05/20 0800   Undermining (cm) 0 cm 11/05/20 0800   Wound Odor None 11/05/20 0800   Exposed Structures None 11/05/20 0800          PROCEDURE: Excisional debridement of left lower extremity ulcer  -2% viscous lidocaine applied topically to wound bed for approximately 5 minutes prior to debridement  -Curette used to debride wound bed.  Excisional debridement was performed to remove devitalized tissue until healthy, bleeding tissue was visualized.   Entire surface of wound,  9.55 cm2 debrided.  Tissue debrided into the subcutaneous layer.    -Bleeding controlled with manual pressure.   -Wound care completed by wound RN, refer to wound flowsheet   -Patient tolerated the procedure well, without c/o of significant pain or discomfort.          PATIENT EDUCATION  -Etiology of pressure injury  -Importance of offloading  -Strategies for offloading in bed and when seated discussed and demonstrated  - Importance of adequate nutrition for wound healing  -Increase protein intake (unless contraindicated by renal status)   - Advised to go  to ER for any increased redness, swelling, drainage or odor, or if patient develops fever, chills, nausea or vomiting.    ASSESSMENT AND PLAN:     1. Pressure injury of buttock, stage 3, unspecified laterality (HCC)  Comments: Shallow stage III ulcers to bilateral medial buttocks, mirroring each other, kissing wounds.      11/05/20:   -Excisional debridement of wound in clinic today, medically necessary to promote wound healing.  -Patient to return to clinic weekly for assessment and debridement  -Patient to change dressing 1-2 times per week in between clinic visits  -Script to [employer] for seating evaluation and off-loading cushion evaluation.   -Educated to increase protein in diet with boost protein or Calos wound supplement powder to promote wound healing.   -Educated off-loading with pillows and frequency with 30 minute intervals.   -Encouraged to use Zyrtec for increased pruritis.     Wound care: Honey colloid, Telfa, Hypafix tape     2. Age-related physical debility  Comments: Patient does not ambulate much, spends most of her day sitting due to physical weakness and chronic back pain.     11/05/20: Patient and spouse verbalize the patient off-loads with pillows to help alleviate pressure. The patient also walks every hour with walker. Patient is unable to lay supine in bed due to severe back pain this contributes to sheer force due to the fact the patient is in a recliner for a significant portion of the day.    3. Obesity due to excess calories, unspecified classification, unspecified whether serious comorbidity present  Comments: Contributing factor, patient is severely obese.   11/05/20: Reiterated to walk every hour.       Patient was seen for 15 minutes face to face of which > 50% of appointment time was spent on counseling and coordination of care regarding the above.      Please note that this dictation was created using voice recognition software. I have worked with technical experts from  [facility] to optimize the interface.  I have made every reasonable attempt to correct obvious errors, but there may be errors of grammar and possibly content that I did not discover before finalizing the note.

## 2020-11-05 NOTE — PATIENT INSTRUCTIONS
-Keep dressings clean and dry. Change dressings if they become over saturated, soiled or fall off.     -Should you experience any significant changes in your wound(s), such as infection (redness, swelling, localized heat, increased pain, fever > 101 F, chills) or have any questions regarding your home care instructions, please contact the wound center at [phone]. If after hours, contact your primary care physician or go to the hospital emergency room.

## 2020-11-06 LAB — INR PPP: 2.4 (ref 2–3.5)

## 2020-11-07 DIAGNOSIS — E03.8 OTHER SPECIFIED HYPOTHYROIDISM: ICD-10-CM

## 2020-11-09 ENCOUNTER — ANTICOAGULATION MONITORING (OUTPATIENT)
Dept: VASCULAR LAB | Facility: MEDICAL CENTER | Age: 80
End: 2020-11-09

## 2020-11-09 DIAGNOSIS — Z79.01 CHRONIC ANTICOAGULATION: ICD-10-CM

## 2020-11-09 RX ORDER — LEVOTHYROXINE SODIUM 0.05 MG/1
TABLET ORAL
Qty: 90 TAB | Refills: 3 | Status: SHIPPED | OUTPATIENT
Start: 2020-11-09 | End: 2022-02-11

## 2020-11-09 NOTE — TELEPHONE ENCOUNTER
Requested Prescriptions     Signed Prescriptions Disp Refills   • levothyroxine (SYNTHROID) 50 MCG Tab 90 Tab 3     Sig: TAKE 1 TABLET BY MOUTH EVERY MORNING ON A EMPTY STOMACH     Authorizing Provider: NORBERT PARADA A.P.R.N.

## 2020-11-10 NOTE — PROGRESS NOTES
OP   Telephone Anticoagulation Service Note      Anticoagulation Summary  As of 2020    INR goal:  2.0-3.0   TTR:  73.7 % (5.3 y)   INR used for dosin.40 (2020)   Warfarin maintenance plan:  3.75 mg (2.5 mg x 1.5) every Fri; 2.5 mg (2.5 mg x 1) all other days   Weekly warfarin total:  18.75 mg   Plan last modified:  Massiel DIAZ Filter (10/23/2020)   Next INR check:  2020   Target end date:  Indefinite    Indications    Atrial fibrillation (HCC) (Resolved) [I48.91]  Chronic anticoagulation [Z79.01]             Anticoagulation Episode Summary     INR check location:  Home Draw    Preferred lab:      Send INR reminders to:      Comments:  Winston YEAGER      Anticoagulation Care Providers     Provider Role Specialty Phone number    Lizzy Haywood M.D. Referring Cardiology [phone]    [facility] Anticoagulation Services Responsible  [phone]    Vladimir Taylor, PharmD Responsible          Anticoagulation Patient Findings  Patient Findings     Negatives:  Signs/symptoms of thrombosis, Signs/symptoms of bleeding, Laboratory test error suspected, Change in health, Change in alcohol use, Change in activity, Upcoming invasive procedure, Emergency department visit, Upcoming dental procedure, Missed doses, Extra doses, Change in medications, Change in diet/appetite, Hospital admission, Bruising, Other complaints        Spoke with the patient on the phone today, reporting a therapeutic INR of 2.4. Confirmed the current warfarin dosing regimen and patient compliance. Patient denies any interval changes to diet and/or medications. Patient denies any signs/symptoms of bleeding or clotting.  Patient instructed to continue with the current warfarin dosing regimen, and asked to follow up again in 2 weeks.     Rayray GreenD

## 2020-11-11 ENCOUNTER — HOME HEALTH ADMISSION (OUTPATIENT)
Dept: HOME HEALTH SERVICES | Facility: HOME HEALTHCARE | Age: 80
End: 2020-11-11
Payer: MEDICARE

## 2020-11-11 ENCOUNTER — OFFICE VISIT (OUTPATIENT)
Dept: WOUND CARE | Facility: MEDICAL CENTER | Age: 80
End: 2020-11-11
Attending: NURSE PRACTITIONER
Payer: MEDICARE

## 2020-11-11 VITALS
HEART RATE: 87 BPM | SYSTOLIC BLOOD PRESSURE: 119 MMHG | OXYGEN SATURATION: 98 % | TEMPERATURE: 97.5 F | DIASTOLIC BLOOD PRESSURE: 61 MMHG | RESPIRATION RATE: 20 BRPM

## 2020-11-11 DIAGNOSIS — E66.09 OBESITY DUE TO EXCESS CALORIES, UNSPECIFIED CLASSIFICATION, UNSPECIFIED WHETHER SERIOUS COMORBIDITY PRESENT: ICD-10-CM

## 2020-11-11 DIAGNOSIS — L89.303 PRESSURE INJURY OF BUTTOCK, STAGE 3, UNSPECIFIED LATERALITY (HCC): Primary | ICD-10-CM

## 2020-11-11 DIAGNOSIS — R54 AGE-RELATED PHYSICAL DEBILITY: ICD-10-CM

## 2020-11-11 PROCEDURE — 11042 DBRDMT SUBQ TIS 1ST 20SQCM/<: CPT | Performed by: NURSE PRACTITIONER

## 2020-11-11 PROCEDURE — 11042 DBRDMT SUBQ TIS 1ST 20SQCM/<: CPT

## 2020-11-11 ASSESSMENT — ENCOUNTER SYMPTOMS
SHORTNESS OF BREATH: 0
NAUSEA: 0
CONSTIPATION: 0
DIARRHEA: 0
NERVOUS/ANXIOUS: 0
BACK PAIN: 1
DEPRESSION: 0
CLAUDICATION: 0
CHILLS: 0
COUGH: 0
VOMITING: 0
FEVER: 0

## 2020-11-11 NOTE — PATIENT INSTRUCTIONS
-Keep your wound dressing clean, dry, and intact.    -Change your dressing every 3 to 4 days and if it becomes soiled, soaked, or falls off.    -Should you experience any significant changes in your wound(s), such as infection (redness, swelling, localized heat, increased pain, fever > 101 F, chills) or have any questions regarding your home care instructions, please contact the wound center at [phone]. If after hours, contact your primary care physician or go to the hospital emergency room.

## 2020-11-11 NOTE — PROGRESS NOTES
Provider Encounter- Pressure Injury    HISTORY OF PRESENT ILLNESS                              START OF CARE IN CLINIC: 11/5/2020               REFERRING PROVIDER: VICTORIANO Montes                 WOUND-pressure ulcer              LOCATION: Patient has bilateral stage III pressure ulcers to her buttocks                                  Shallow stage III, left buttock, mirroring each right buttock wound (kissing  wounds)              WOUND HISTORY: Patient is an 78 year old female who has had wounds to her buttocks off and on since December 2018. She is unsure of how the wounds started, but does state that she has a bad back and is in her recliner chair day and night.  She does get up several times per day to go to the bathroom, but otherwise does not ambulate much.  She also has problems with frequent urinary incontinence, wears absorptive pads.  Her  brings her food, does all the cooking, cleaning and shopping.  She was seen previously for these wounds in the clinic, discharged most recently in June 2020 due to healing.  She has a Roho cushion for offloading for her chair.     11/5/2020: Patient is well-known to [facility] from prior referral.  Patient returns with bilateral stage III pressure ulcers.  Patient has been reports that the patient has been offloading as advised by her PCP she is walking wound every hour.  Patient is offloading with Roho cushion they brought off the Internet.  Patient however due to back pain cannot lay flat that has not laid in bed for years uses a recliner this is the cause of friction and shear and pressure to these wound areas.                  PERTINENT PMH: Obesity, limited mobility, chronic back pain, CAD, bilateral knee replacements, right hip replacement    Contributing factors: Immobility -yes.  Activity level: -Sedentary.  Support surfaces: -Waffle cushion to chair.  Incontinence: No.  Nutritional state: Well-nourished. Caregiver assistance: . Obesity:  Yes. Moisture: Yes      DIABETES: No    TOBACCO USE: She has never smoked or used tobacco products      Interval History:  11/5/2020: Clinic visit with PHYLLIS Marie. Patient states that they are feeling well today.  Patient denies fever, chills, nausea, vomiting, lightheadedness, dizziness, shortness of breath and chest pain.  Bilateral stage III pressure ulcers to buttocks debrided in clinic today.  Patient given prescription to [employer] to evaluate for offloading cushion.  Patient has purchased a Roho cushion from the Internet suggested that the patient take the Roho cushion back to [employer] for proper inflation and to determine if it actually is the correct offloading cushion for this patient.    11/11/2020: Clinic visit with PHYLLIS Marie. Patient states that they are feeling well today.  Patient denies fever, chills, nausea, vomiting, lightheadedness, dizziness, shortness of breath and chest pain.  Patient's wounds are slightly smaller in clinic today continue with current plan of care.  Home health order placed today.       REVIEW OF SYSTEMS:   Review of Systems   Constitutional: Negative for chills and fever.   Respiratory: Negative for cough and shortness of breath.         Shortness of breath with exertion   Cardiovascular: Positive for leg swelling. Negative for chest pain and claudication.   Gastrointestinal: Negative for constipation, diarrhea, nausea and vomiting.   Genitourinary:        Frequent incontinence of urine  Occasional incontinence of stool  Wears absorptive pad   Musculoskeletal: Positive for back pain and joint pain.        Chronic back and hip pain, debilitating   Psychiatric/Behavioral: Negative for depression. The patient is not nervous/anxious.        PHYSICAL EXAMINATION:     Physical Exam   Constitutional: She is oriented to person, place, and time.   Obese elderly female   HENT:   Head: Normocephalic.   Eyes: Pupils are equal, round, and reactive to light.    Pulmonary/Chest: Effort normal.   Musculoskeletal:         General: Edema present.      Comments: Generalized dependent bilateral lower extremity edema  Uses walker for ambulation   Neurological: She is alert and oriented to person, place, and time.   Skin: Skin is warm.   Bilateral pressure ulcers to buttocks stage III  See doc flowsheets and photos   Psychiatric: Mood, memory, affect and judgment normal.     Wound Assessment           Wound 11/05/20 Full Thickness Wound Buttocks Inner Left -Left Inner Buttock (Active)   Wound Image    11/05/20 0800   Site Assessment Pink;Yellow 11/05/20 0800   Periwound Assessment Dry 11/05/20 0800   Margins Attached edges 11/05/20 0800   Drainage Amount KESHA 11/05/20 0800   Drainage Description KESHA 11/05/20 0800   Treatments Cleansed;Topical Lidocaine;Provider debridement 11/05/20 0800   Wound Cleansing Normal Saline Irrigation 11/05/20 0800   Periwound Protectant Skin Protectant Wipes to Periwound 11/05/20 0800   Dressing Options Honey Colloid;Telfa;Hypafix Tape 11/05/20 0800   Dressing Changed New 11/05/20 0800   Non-staged Wound Description Full thickness 11/05/20 0800   Wound Length (cm) 1.5 cm 11/05/20 0800   Wound Width (cm) 1 cm 11/05/20 0800   Wound Depth (cm) 0.1 cm 11/05/20 0800   Wound Surface Area (cm^2) 1.5 cm^2 11/05/20 0800   Wound Volume (cm^3) 0.15 cm^3 11/05/20 0800   Post-Procedure Length (cm) 1.9 cm 11/05/20 0800   Post-Procedure Width (cm) 1 cm 11/05/20 0800   Post-Procedure Depth (cm) 0.1 cm 11/05/20 0800   Post-Procedure Surface Area (cm^2) 1.9 cm^2 11/05/20 0800   Post-Procedure Volume (cm^3) 0.19 cm^3 11/05/20 0800   Wound Bed Epithelium (%) 0 % 11/05/20 0800   Wound Bed Slough (%) 40 % 11/05/20 0800   Wound Bed Eschar (%) 0 % 11/05/20 0800   Tunneling (cm) 0 cm 11/05/20 0800   Undermining (cm) 0 cm 11/05/20 0800   Wound Odor None 11/05/20 0800   Exposed Structures None 11/05/20 0800       Wound 11/05/20 Full Thickness Wound Buttocks Inner Right -Right  Inner Buttock (Active)   Wound Image    11/05/20 0800   Site Assessment Pink;Yellow 11/05/20 0800   Periwound Assessment Dry 11/05/20 0800   Margins Attached edges 11/05/20 0800   Drainage Amount KESHA 11/05/20 0800   Drainage Description KESHA 11/05/20 0800   Treatments Cleansed;Topical Lidocaine;Provider debridement 11/05/20 0800   Wound Cleansing Normal Saline Irrigation 11/05/20 0800   Periwound Protectant Skin Protectant Wipes to Periwound 11/05/20 0800   Dressing Options Honey Colloid;Telfa;Hypafix Tape 11/05/20 0800   Dressing Changed New 11/05/20 0800   Non-staged Wound Description Full thickness 11/05/20 0800   Wound Length (cm) 4.5 cm 11/05/20 0800   Wound Width (cm) 1.5 cm 11/05/20 0800   Wound Depth (cm) 0.1 cm 11/05/20 0800   Wound Surface Area (cm^2) 6.75 cm^2 11/05/20 0800   Wound Volume (cm^3) 0.68 cm^3 11/05/20 0800   Post-Procedure Length (cm) 4.5 cm 11/05/20 0800   Post-Procedure Width (cm) 1.7 cm 11/05/20 0800   Post-Procedure Depth (cm) 0.2 cm 11/05/20 0800   Post-Procedure Surface Area (cm^2) 7.65 cm^2 11/05/20 0800   Post-Procedure Volume (cm^3) 1.53 cm^3 11/05/20 0800   Wound Bed Epithelium (%) 0 % 11/05/20 0800   Wound Bed Slough (%) 50 % 11/05/20 0800   Wound Bed Eschar (%) 0 % 11/05/20 0800   Tunneling (cm) 0 cm 11/05/20 0800   Undermining (cm) 0 cm 11/05/20 0800   Wound Odor None 11/05/20 0800   Exposed Structures None 11/05/20 0800          PROCEDURE: Excisional debridement of left lower extremity ulcer  -2% viscous lidocaine applied topically to wound bed for approximately 5 minutes prior to debridement  -Curette used to debride wound bed.  Excisional debridement was performed to remove devitalized tissue until healthy, bleeding tissue was visualized.   Entire surface of wound,  9.64 cm2 debrided.  Tissue debrided into the subcutaneous layer.    -Bleeding controlled with manual pressure.   -Wound care completed by wound RN, refer to wound flowsheet   -Patient tolerated the procedure well,  without c/o of significant pain or discomfort.          PATIENT EDUCATION  -Etiology of pressure injury  -Importance of offloading  -Strategies for offloading in bed and when seated discussed and demonstrated  - Importance of adequate nutrition for wound healing  -Increase protein intake (unless contraindicated by renal status)   - Advised to go to ER for any increased redness, swelling, drainage or odor, or if patient develops fever, chills, nausea or vomiting.    ASSESSMENT AND PLAN:     1. Pressure injury of buttock, stage 3, unspecified laterality (HCC)  Comments: Shallow stage III ulcers to bilateral medial buttocks, mirroring each other, kissing wounds.      11/11/20:   -Excisional debridement of wound in clinic today, medically necessary to promote wound healing.  -Patient to return to clinic weekly for assessment and debridement  -Patient to change dressing 1-2 times per week in between clinic visits  -Script to [facility] for seating evaluation and off-loading cushion evaluation given on 11/5/2020.  Discuss with patient next clinic visit if they have followed up on the referral to [employer].  -Educated to increase protein in diet with boost protein or Calos wound supplement powder to promote wound healing.   -Educated off-loading with pillows and frequency with 30 minute intervals.       Wound care: Honey colloid, nonadhesive foam Hypafix tape     2. Age-related physical debility  Comments: Patient does not ambulate much, spends most of her day sitting due to physical weakness and chronic back pain.     11/11/20: Patient and spouse verbalize the patient off-loads with pillows to help alleviate pressure. The patient also walks every hour with walker. Patient is unable to lay supine in bed due to severe back pain this contributes to sheer force, due to the fact the patient is in a recliner for a significant portion of the day.    3. Obesity due to excess calories, unspecified classification, unspecified whether  serious comorbidity present  Comments: Contributing factor, patient is severely obese.   11/11/20: Reiterated to walk every hour.         Please note that this dictation was created using voice recognition software. I have worked with technical experts from [facility] to optimize the interface.  I have made every reasonable attempt to correct obvious errors, but there may be errors of grammar and possibly content that I did not discover before finalizing the note.

## 2020-11-13 DIAGNOSIS — J30.1 SEASONAL ALLERGIC RHINITIS DUE TO POLLEN: ICD-10-CM

## 2020-11-13 RX ORDER — IPRATROPIUM BROMIDE 21 UG/1
2 SPRAY, METERED NASAL EVERY 12 HOURS
Qty: 30 ML | Refills: 2 | Status: SHIPPED | OUTPATIENT
Start: 2020-11-13 | End: 2021-11-18 | Stop reason: SDUPTHER

## 2020-11-14 NOTE — TELEPHONE ENCOUNTER
Requested Prescriptions     Signed Prescriptions Disp Refills   • ipratropium (ATROVENT) 0.03 % Solution 30 mL 2     Sig: Administer 2 Sprays into affected nostril(S) every 12 hours.     Authorizing Provider: NORBERT PARADA A.P.R.N.

## 2020-11-16 ENCOUNTER — TELEPHONE (OUTPATIENT)
Dept: WOUND CARE | Facility: MEDICAL CENTER | Age: 80
End: 2020-11-16

## 2020-11-16 ENCOUNTER — HOSPITAL ENCOUNTER (OUTPATIENT)
Dept: LAB | Facility: MEDICAL CENTER | Age: 80
End: 2020-11-16
Attending: INTERNAL MEDICINE
Payer: MEDICARE

## 2020-11-16 DIAGNOSIS — I10 ESSENTIAL HYPERTENSION: ICD-10-CM

## 2020-11-16 DIAGNOSIS — Z79.899 HIGH RISK MEDICATION USE: ICD-10-CM

## 2020-11-16 DIAGNOSIS — M48.00 SPINAL STENOSIS, MULTILEVEL: ICD-10-CM

## 2020-11-16 DIAGNOSIS — N18.32 STAGE 3B CHRONIC KIDNEY DISEASE: ICD-10-CM

## 2020-11-16 LAB
ALBUMIN SERPL BCP-MCNC: 3.9 G/DL (ref 3.2–4.9)
ALBUMIN/GLOB SERPL: 1.3 G/DL
ALP SERPL-CCNC: 75 U/L (ref 30–99)
ALT SERPL-CCNC: 7 U/L (ref 2–50)
ANION GAP SERPL CALC-SCNC: 9 MMOL/L (ref 7–16)
AST SERPL-CCNC: 11 U/L (ref 12–45)
BILIRUB SERPL-MCNC: 0.2 MG/DL (ref 0.1–1.5)
BUN SERPL-MCNC: 24 MG/DL (ref 8–22)
CALCIUM SERPL-MCNC: 9.3 MG/DL (ref 8.5–10.5)
CHLORIDE SERPL-SCNC: 103 MMOL/L (ref 96–112)
CO2 SERPL-SCNC: 25 MMOL/L (ref 20–33)
CREAT SERPL-MCNC: 1.19 MG/DL (ref 0.5–1.4)
GLOBULIN SER CALC-MCNC: 3 G/DL (ref 1.9–3.5)
GLUCOSE SERPL-MCNC: 91 MG/DL (ref 65–99)
POTASSIUM SERPL-SCNC: 5.4 MMOL/L (ref 3.6–5.5)
PROT SERPL-MCNC: 6.9 G/DL (ref 6–8.2)
SODIUM SERPL-SCNC: 137 MMOL/L (ref 135–145)

## 2020-11-16 PROCEDURE — 80053 COMPREHEN METABOLIC PANEL: CPT

## 2020-11-16 PROCEDURE — 36415 COLL VENOUS BLD VENIPUNCTURE: CPT

## 2020-11-16 NOTE — TELEPHONE ENCOUNTER
Received message to call patient's . Returned call and no answer or identifiers on phone to leave message.

## 2020-11-17 DIAGNOSIS — L89.303 PRESSURE INJURY OF BUTTOCK, STAGE 3, UNSPECIFIED LATERALITY (HCC): ICD-10-CM

## 2020-11-17 NOTE — TELEPHONE ENCOUNTER
Talked with  and he states they were told by [facility] that they are out of service areas and referral would go to another agency. Advised Woody I would look into it and have emailed [facility] for status.

## 2020-11-18 ENCOUNTER — NON-PROVIDER VISIT (OUTPATIENT)
Dept: WOUND CARE | Facility: MEDICAL CENTER | Age: 80
End: 2020-11-18
Attending: NURSE PRACTITIONER
Payer: MEDICARE

## 2020-11-18 PROCEDURE — 97597 DBRDMT OPN WND 1ST 20 CM/<: CPT

## 2020-11-18 NOTE — WOUND TEAM
Both [facility] and [facility] have declined patient as they are out of the service area. The referral has now been sent to [facility] and we are awaiting acceptance. Updated the patient and her .

## 2020-11-18 NOTE — PROCEDURES
2% viscous lidocaine for ~5 minute dwell time. CSWD with curette to remove <1 cm2 non viable tissue from both wounds. Patient tolerated well.

## 2020-11-20 ENCOUNTER — ANTICOAGULATION MONITORING (OUTPATIENT)
Dept: MEDICAL GROUP | Facility: PHYSICIAN GROUP | Age: 80
End: 2020-11-20

## 2020-11-20 DIAGNOSIS — Z79.01 CHRONIC ANTICOAGULATION: ICD-10-CM

## 2020-11-20 LAB — INR PPP: 1.8 (ref 2–3.5)

## 2020-11-20 NOTE — PROGRESS NOTES
OP   Telephone Anticoagulation Service Note      Anticoagulation Summary  As of 2020    INR goal:  2.0-3.0   TTR:  73.7 % (5.3 y)   INR used for dosin.80 (2020)   Warfarin maintenance plan:  3.75 mg (2.5 mg x 1.5) every Fri; 2.5 mg (2.5 mg x 1) all other days   Weekly warfarin total:  18.75 mg   Plan last modified:  Massiel Coleman (10/23/2020)   Next INR check:  2020   Target end date:  Indefinite    Indications    Atrial fibrillation (HCC) (Resolved) [I48.91]  Chronic anticoagulation [Z79.01]             Anticoagulation Episode Summary     INR check location:  Home Draw    Preferred lab:      Send INR reminders to:      Comments:  Winston YEAGER      Anticoagulation Care Providers     Provider Role Specialty Phone number    Lizzy Haywood M.D. Referring Cardiology [phone]    [facility] Anticoagulation Services Responsible  [phone]    Vladimir Taylor, PharmD Responsible          Anticoagulation Patient Findings     Spoke with the patient on the phone today, reporting a SUB-therapeutic INR of 1.8.  Confirmed the current warfarin dosing regimen and patient compliance. Patient denies any missed doses.   Patient denies any interval changes to diet and/or medications. Patient denies any signs/symptoms of bleeding or clotting.  Patient instructed to bolus with warfarin 5mg TONIGHT ONLY, then to resume her current dosing regimen. Patient will retest again in 2 weeks.     Rayray GreenD

## 2020-11-23 DIAGNOSIS — F32.0 CURRENT MILD EPISODE OF MAJOR DEPRESSIVE DISORDER WITHOUT PRIOR EPISODE (HCC): ICD-10-CM

## 2020-11-23 DIAGNOSIS — R32 URINARY INCONTINENCE, UNSPECIFIED TYPE: ICD-10-CM

## 2020-11-23 RX ORDER — MIRABEGRON 50 MG/1
50 TABLET, FILM COATED, EXTENDED RELEASE ORAL DAILY
Qty: 90 TAB | Refills: 3 | Status: SHIPPED | OUTPATIENT
Start: 2020-11-23 | End: 2022-12-12 | Stop reason: SDUPTHER

## 2020-11-23 NOTE — TELEPHONE ENCOUNTER
Requested Prescriptions     Signed Prescriptions Disp Refills   • sertraline (ZOLOFT) 50 MG Tab 90 Tab 3     Sig: Take 1 Tab by mouth every day.     Authorizing Provider: NORBERT PARADA   • Mirabegron ER (MYRBETRIQ) 50 MG TABLET SR 24 HR 90 Tab 3     Sig: Take 50 mg by mouth every day.     Authorizing Provider: NORBERT PARADA A.P.R.N.

## 2020-11-24 ENCOUNTER — APPOINTMENT (OUTPATIENT)
Dept: WOUND CARE | Facility: MEDICAL CENTER | Age: 80
End: 2020-11-24
Attending: NURSE PRACTITIONER
Payer: MEDICARE

## 2020-12-02 ENCOUNTER — OFFICE VISIT (OUTPATIENT)
Dept: WOUND CARE | Facility: MEDICAL CENTER | Age: 80
End: 2020-12-02
Attending: FAMILY MEDICINE
Payer: MEDICARE

## 2020-12-02 VITALS
DIASTOLIC BLOOD PRESSURE: 59 MMHG | OXYGEN SATURATION: 96 % | HEART RATE: 98 BPM | SYSTOLIC BLOOD PRESSURE: 116 MMHG | RESPIRATION RATE: 16 BRPM | TEMPERATURE: 97.8 F

## 2020-12-02 DIAGNOSIS — L89.303 PRESSURE INJURY OF BUTTOCK, STAGE 3, UNSPECIFIED LATERALITY (HCC): Primary | ICD-10-CM

## 2020-12-02 DIAGNOSIS — E66.09 OBESITY DUE TO EXCESS CALORIES, UNSPECIFIED CLASSIFICATION, UNSPECIFIED WHETHER SERIOUS COMORBIDITY PRESENT: ICD-10-CM

## 2020-12-02 DIAGNOSIS — R54 AGE-RELATED PHYSICAL DEBILITY: ICD-10-CM

## 2020-12-02 PROCEDURE — 11042 DBRDMT SUBQ TIS 1ST 20SQCM/<: CPT | Performed by: NURSE PRACTITIONER

## 2020-12-02 PROCEDURE — 11042 DBRDMT SUBQ TIS 1ST 20SQCM/<: CPT

## 2020-12-02 ASSESSMENT — ENCOUNTER SYMPTOMS
FEVER: 0
CONSTIPATION: 0
CHILLS: 0
VOMITING: 0
CLAUDICATION: 0
BACK PAIN: 1
DEPRESSION: 0
NAUSEA: 0
NERVOUS/ANXIOUS: 0
DIARRHEA: 0
SHORTNESS OF BREATH: 0
COUGH: 0

## 2020-12-02 NOTE — PATIENT INSTRUCTIONS
-Keep your wound dressing clean, dry, and intact.    -Change your dressing if it becomes soiled, soaked, or falls off.    -Should you experience any significant changes in your wound(s), such as infection (redness, swelling, localized heat, increased pain, fever > 101 F, chills) or have any questions regarding your home care instructions, please contact the wound center at [phone]. If after hours, contact your primary care physician or go to the hospital emergency room.

## 2020-12-02 NOTE — PROGRESS NOTES
Provider Encounter- Pressure Injury    HISTORY OF PRESENT ILLNESS                              START OF CARE IN CLINIC: 11/5/2020               REFERRING PROVIDER: VICTORIANO Montes                 WOUND-pressure ulcer              LOCATION: Patient has bilateral stage III pressure ulcers to her buttocks                                  Shallow stage III, left buttock, mirroring each right buttock wound (kissing  wounds)              WOUND HISTORY: Patient is an 78 year old female who has had wounds to her buttocks off and on since December 2018. She is unsure of how the wounds started, but does state that she has a bad back and is in her recliner chair day and night.  She does get up several times per day to go to the bathroom, but otherwise does not ambulate much.  She also has problems with frequent urinary incontinence, wears absorptive pads.  Her  brings her food, does all the cooking, cleaning and shopping.  She was seen previously for these wounds in the clinic, discharged most recently in June 2020 due to healing.  She has a Roho cushion for offloading for her chair.     11/5/2020: Patient is well-known to [facility] from prior referral.  Patient returns with bilateral stage III pressure ulcers.  Patient has been reports that the patient has been offloading as advised by her PCP she is walking wound every hour.  Patient is offloading with Roho cushion they brought off the Internet.  Patient however due to back pain cannot lay flat that has not laid in bed for years uses a recliner this is the cause of friction and shear and pressure to these wound areas.                  PERTINENT PMH: Obesity, limited mobility, chronic back pain, CAD, bilateral knee replacements, right hip replacement    Contributing factors: Immobility -yes.  Activity level: -Sedentary.  Support surfaces: -Waffle cushion to chair.  Incontinence: No.  Nutritional state: Well-nourished. Caregiver assistance: . Obesity:  Yes. Moisture: Yes      DIABETES: No    TOBACCO USE: She has never smoked or used tobacco products      Interval History:  11/5/2020: Clinic visit with PHYLLIS Marie. Patient states that they are feeling well today.  Patient denies fever, chills, nausea, vomiting, lightheadedness, dizziness, shortness of breath and chest pain.  Bilateral stage III pressure ulcers to buttocks debrided in clinic today.  Patient given prescription to [employer] to evaluate for offloading cushion.  Patient has purchased a Roho cushion from the Internet suggested that the patient take the Roho cushion back to [employer] for proper inflation and to determine if it actually is the correct offloading cushion for this patient.    11/11/2020: Clinic visit with PHYLLIS Marie. Patient states that they are feeling well today.  Patient denies fever, chills, nausea, vomiting, lightheadedness, dizziness, shortness of breath and chest pain.  Patient's wounds are slightly smaller in clinic today continue with current plan of care.  Home health order placed today.    12/2/2020: Clinic visit with PHYLLIS Marie. Patient states that they are feeling well today.  Patient denies fever, chills, nausea, vomiting, lightheadedness, dizziness, shortness of breath and chest pain.  Wound to left buttocks has resolved.  Wound to right buttocks is improving and is smaller continue with current plan of care.       REVIEW OF SYSTEMS:   Review of Systems   Constitutional: Negative for chills and fever.   Respiratory: Negative for cough and shortness of breath.         Shortness of breath with exertion   Cardiovascular: Positive for leg swelling. Negative for chest pain and claudication.   Gastrointestinal: Negative for constipation, diarrhea, nausea and vomiting.   Genitourinary:        Frequent incontinence of urine  Occasional incontinence of stool  Wears absorptive pad   Musculoskeletal: Positive for back pain and joint pain.        Chronic  back and hip pain, debilitating   Psychiatric/Behavioral: Negative for depression. The patient is not nervous/anxious.        PHYSICAL EXAMINATION:     Physical Exam   Constitutional: She is oriented to person, place, and time.   Obese elderly female   HENT:   Head: Normocephalic.   Eyes: Pupils are equal, round, and reactive to light.   Pulmonary/Chest: Effort normal.   Musculoskeletal:         General: Edema present.      Comments: Generalized dependent bilateral lower extremity edema  Uses walker for ambulation   Neurological: She is alert and oriented to person, place, and time.   Skin: Skin is warm.   Ulcer to left buttocks resolved.  Ulcer to right buttocks improving  See doc flowsheets and photos   Psychiatric: Mood, memory, affect and judgment normal.     Wound Assessment         Wound 11/05/20 Full Thickness Wound Buttocks Inner Left -Left Medial Buttock (Active)   Wound Image    12/02/20 1100   Site Assessment Epithelialization 12/02/20 1100   Periwound Assessment Dry;Callused 12/02/20 1100   Margins Attached edges 11/18/20 1100   Closure Secondary intention 11/18/20 1100   Drainage Amount KESHA 12/02/20 1100   Drainage Description KESHA 12/02/20 1100   Treatments Cleansed;Topical Lidocaine;Provider debridement;Site care 12/02/20 1100   Wound Cleansing Puracyn [place] 12/02/20 1100   Periwound Protectant Skin Protectant Wipes to Periwound;Barrier Paste 12/02/20 1100   Dressing Cleansing/Solutions Not Applicable 12/02/20 1100   Dressing Options Open to Air 12/02/20 1100   Dressing Changed Changed 12/02/20 1100   Dressing Status Clean;Dry;Intact 12/02/20 1100   Dressing Change/Treatment Frequency Every 72 hrs, and As Needed 11/18/20 1100   Non-staged Wound Description Not applicable 12/02/20 1100   Wound Length (cm) 1 cm 11/11/20 1100   Wound Width (cm) 0.7 cm 11/11/20 1100   Wound Depth (cm) 0.1 cm 11/11/20 1100   Wound Surface Area (cm^2) 0.7 cm^2 11/11/20 1100   Wound Volume (cm^3) 0.07 cm^3 11/11/20 1100    Post-Procedure Length (cm) 0.4 cm 11/18/20 1100   Post-Procedure Width (cm) 0.2 cm 11/18/20 1100   Post-Procedure Depth (cm) 0.1 cm 11/18/20 1100   Post-Procedure Surface Area (cm^2) 0.08 cm^2 11/18/20 1100   Post-Procedure Volume (cm^3) 0.01 cm^3 11/18/20 1100   Wound Healing % 53 11/11/20 1100   Wound Bed Epithelium (%) 0 % 11/05/20 0800   Wound Bed Slough (%) 40 % 11/05/20 0800   Wound Bed Eschar (%) 0 % 11/05/20 0800   Tunneling (cm) 0 cm 12/02/20 1100   Undermining (cm) 0 cm 12/02/20 1100   Wound Odor None 12/02/20 1100   Exposed Structures None 12/02/20 1100       Wound 11/05/20 Full Thickness Wound Buttocks Inner Right -Right Medial Buttock (Active)   Wound Image    12/02/20 1100   Site Assessment Pink;Yellow 12/02/20 1100   Periwound Assessment Dry;Callused 12/02/20 1100   Margins Attached edges 12/02/20 1100   Closure Secondary intention 11/18/20 1100   Drainage Amount KESHA 12/02/20 1100   Drainage Description KESHA 12/02/20 1100   Treatments Cleansed;Topical Lidocaine;Provider debridement;Site care 12/02/20 1100   Wound Cleansing Puracyn [place] 12/02/20 1100   Periwound Protectant Skin Protectant Wipes to Periwound;Barrier Paste 12/02/20 1100   Dressing Cleansing/Solutions Not Applicable 12/02/20 1100   Dressing Options Collagen Dressing;Hydrofera Blue Ready;Hypafix Tape;Transparent Film 12/02/20 1100   Dressing Changed New 12/02/20 1100   Dressing Status Clean;Dry;Intact 12/02/20 1100   Dressing Change/Treatment Frequency Every 72 hrs, and As Needed 11/18/20 1100   Non-staged Wound Description Full thickness 12/02/20 1100   Wound Length (cm) 0.9 cm 12/02/20 1100   Wound Width (cm) 0.4 cm 12/02/20 1100   Wound Depth (cm) 0.2 cm 12/02/20 1100   Wound Surface Area (cm^2) 0.36 cm^2 12/02/20 1100   Wound Volume (cm^3) 0.07 cm^3 12/02/20 1100   Post-Procedure Length (cm) 0.5 cm 12/02/20 1100   Post-Procedure Width (cm) 0.4 cm 12/02/20 1100   Post-Procedure Depth (cm) 0.2 cm 12/02/20 1100   Post-Procedure Surface  Area (cm^2) 0.2 cm^2 12/02/20 1100   Post-Procedure Volume (cm^3) 0.04 cm^3 12/02/20 1100   Wound Healing % 90 12/02/20 1100   Wound Bed Epithelium (%) 0 % 11/05/20 0800   Wound Bed Slough (%) 50 % 11/05/20 0800   Wound Bed Eschar (%) 0 % 11/05/20 0800   Tunneling (cm) 0 cm 12/02/20 1100   Undermining (cm) 0 cm 12/02/20 1100   Wound Odor None 12/02/20 1100   Exposed Structures None 12/02/20 1100              PROCEDURE: Excisional debridement of left lower extremity ulcer  -2% viscous lidocaine applied topically to wound bed for approximately 5 minutes prior to debridement  -Curette used to debride wound bed.  Excisional debridement was performed to remove devitalized tissue until healthy, bleeding tissue was visualized.   Entire surface of wound, 0.2 cm2 debrided.  Tissue debrided into the subcutaneous layer.    -Bleeding controlled with manual pressure.   -Wound care completed by wound RN, refer to wound flowsheet   -Patient tolerated the procedure well, without c/o of significant pain or discomfort.          PATIENT EDUCATION  -Etiology of pressure injury  -Importance of offloading  -Strategies for offloading in bed and when seated discussed and demonstrated  - Importance of adequate nutrition for wound healing  -Increase protein intake (unless contraindicated by renal status)   - Advised to go to ER for any increased redness, swelling, drainage or odor, or if patient develops fever, chills, nausea or vomiting.    ASSESSMENT AND PLAN:     1. Pressure injury of buttock, stage 3, unspecified laterality (HCC)  Comments: Shallow stage III ulcers to bilateral medial buttocks, mirroring each other, kissing wounds.      12/02/20:   -Excisional debridement of wound in clinic today, medically necessary to promote wound healing.  -Patient to return to clinic weekly for assessment and debridement  -Patient to change dressing 1-2 times per week in between clinic visits  -Script to [facility] for seating evaluation and  off-loading cushion evaluation given on 11/5/2020.  Discuss with patient next clinic visit if they have followed up on the referral to [employer].  -Educated to increase protein in diet with boost protein or Calos wound supplement powder to promote wound healing.   -Educated off-loading with pillows and frequency with 30 minute intervals.       Wound care: Collagen dressing, Hydrofera Blue ready, Hypafix tape, transparent film    2. Age-related physical debility  Comments: Patient does not ambulate much, spends most of her day sitting due to physical weakness and chronic back pain.     12/02/20: Patient and spouse verbalize the patient off-loads with pillows to help alleviate pressure. The patient also walks every hour with walker. Patient is unable to lay supine in bed due to severe back pain this contributes to sheer force, due to the fact the patient is in a recliner for a significant portion of the day.    3. Obesity due to excess calories, unspecified classification, unspecified whether serious comorbidity present  Comments: Contributing factor, patient is severely obese.   12/02/20: Reiterated to walk every hour.         Please note that this dictation was created using voice recognition software. I have worked with technical experts from [facility] to optimize the interface.  I have made every reasonable attempt to correct obvious errors, but there may be errors of grammar and possibly content that I did not discover before finalizing the note.

## 2020-12-02 NOTE — LETTER
December 2, 2020      To: [facility] at Fax number [phone]    Re: Donte Alicea [facility]      Wound Care instructions:    Right Medial Buttock wound:  · Remove old dressing  · Cleanse wound with normal saline, wound cleanser or puracyn spray.  · Pat dry with sterile gauze.  · Apply skin prep to the outer periwound area.  · Apply zinc barrier cream to the immediate periwound area.  · Place a piece of hydrafera blue ready foam on top of the wound bed.  · Secure with hypafix tape.  · Apply a tegaderm over the dressing if requested    Left Medial Buttock wound:  · Apply zinc barrier cream to the area.      Please see the patient 1-2 times a week and as needed. The patient will come to the [facility] clinic once a week for wound assessment, debridement and dressing placement. Please call with any questions or concerns.        PHYLLIS Marie

## 2020-12-04 ENCOUNTER — ANTICOAGULATION MONITORING (OUTPATIENT)
Dept: MEDICAL GROUP | Facility: PHYSICIAN GROUP | Age: 80
End: 2020-12-04

## 2020-12-04 DIAGNOSIS — Z79.01 CHRONIC ANTICOAGULATION: ICD-10-CM

## 2020-12-04 LAB — INR PPP: 2.7 (ref 2–3.5)

## 2020-12-05 NOTE — PROGRESS NOTES
Anticoagulation Summary  As of 2020    INR goal:  2.0-3.0   TTR:  73.7 % (5.4 y)   INR used for dosin.70 (2020)   Warfarin maintenance plan:  3.75 mg (2.5 mg x 1.5) every Fri; 2.5 mg (2.5 mg x 1) all other days   Weekly warfarin total:  18.75 mg   Plan last modified:  Massiel Coleman (10/23/2020)   Next INR check:  2020   Target end date:  Indefinite    Indications    Atrial fibrillation (HCC) (Resolved) [I48.91]  Chronic anticoagulation [Z79.01]             Anticoagulation Episode Summary     INR check location:  Home Draw    Preferred lab:      Send INR reminders to:      Comments:  Winston YEAGER      Anticoagulation Care Providers     Provider Role Specialty Phone number    Lizzy Haywood M.D. Referring Cardiology [phone]    [facility] Responsible  [phone]    Vladimir Taylor, PharmD Responsible          Anticoagulation Patient Findings          Spoke with Donte Alicea to report a therapeutic INR of 2.7. Continue current dosing regimen.  Follow up in 2 weeks, to reduce the risk of adverse events related to this high risk medication, warfarin.    Massiel Coleman, Clinical Pharmacist

## 2020-12-09 ENCOUNTER — OFFICE VISIT (OUTPATIENT)
Dept: WOUND CARE | Facility: MEDICAL CENTER | Age: 80
End: 2020-12-09
Attending: FAMILY MEDICINE
Payer: MEDICARE

## 2020-12-09 VITALS
TEMPERATURE: 97.6 F | HEART RATE: 94 BPM | SYSTOLIC BLOOD PRESSURE: 128 MMHG | DIASTOLIC BLOOD PRESSURE: 61 MMHG | RESPIRATION RATE: 16 BRPM | OXYGEN SATURATION: 96 %

## 2020-12-09 DIAGNOSIS — R54 AGE-RELATED PHYSICAL DEBILITY: ICD-10-CM

## 2020-12-09 DIAGNOSIS — L89.303 PRESSURE INJURY OF BUTTOCK, STAGE 3, UNSPECIFIED LATERALITY (HCC): Primary | ICD-10-CM

## 2020-12-09 DIAGNOSIS — E66.09 OBESITY DUE TO EXCESS CALORIES, UNSPECIFIED CLASSIFICATION, UNSPECIFIED WHETHER SERIOUS COMORBIDITY PRESENT: ICD-10-CM

## 2020-12-09 PROCEDURE — 11042 DBRDMT SUBQ TIS 1ST 20SQCM/<: CPT

## 2020-12-09 PROCEDURE — 11042 DBRDMT SUBQ TIS 1ST 20SQCM/<: CPT | Performed by: NURSE PRACTITIONER

## 2020-12-09 ASSESSMENT — ENCOUNTER SYMPTOMS
VOMITING: 0
NAUSEA: 0
COUGH: 0
FEVER: 0
DIARRHEA: 0
NERVOUS/ANXIOUS: 0
SHORTNESS OF BREATH: 0
BACK PAIN: 1
CHILLS: 0
CLAUDICATION: 0
DEPRESSION: 0
CONSTIPATION: 0

## 2020-12-09 ASSESSMENT — PAIN SCALES - GENERAL: PAINLEVEL: 4=SLIGHT-MODERATE PAIN

## 2020-12-09 NOTE — PROGRESS NOTES
Provider Encounter- Pressure Injury    HISTORY OF PRESENT ILLNESS                              START OF CARE IN CLINIC: 11/5/2020               REFERRING PROVIDER: VICTORIANO Montes                 WOUND-pressure ulcer              LOCATION: Patient has bilateral stage III pressure ulcers to her buttocks                                  Shallow stage III, left buttock, mirroring each right buttock wound (kissing  wounds)              WOUND HISTORY: Patient is an 78 year old female who has had wounds to her buttocks off and on since December 2018. She is unsure of how the wounds started, but does state that she has a bad back and is in her recliner chair day and night.  She does get up several times per day to go to the bathroom, but otherwise does not ambulate much.  She also has problems with frequent urinary incontinence, wears absorptive pads.  Her  brings her food, does all the cooking, cleaning and shopping.  She was seen previously for these wounds in the clinic, discharged most recently in June 2020 due to healing.  She has a Roho cushion for offloading for her chair.     11/5/2020: Patient is well-known to [facility] from prior referral.  Patient returns with bilateral stage III pressure ulcers.  Patient has been reports that the patient has been offloading as advised by her PCP she is walking wound every hour.  Patient is offloading with Roho cushion they brought off the Internet.  Patient however due to back pain cannot lay flat that has not laid in bed for years uses a recliner this is the cause of friction and shear and pressure to these wound areas.                  PERTINENT PMH: Obesity, limited mobility, chronic back pain, CAD, bilateral knee replacements, right hip replacement    Contributing factors: Immobility -yes.  Activity level: -Sedentary.  Support surfaces: -Waffle cushion to chair.  Incontinence: No.  Nutritional state: Well-nourished. Caregiver assistance: . Obesity:  Yes. Moisture: Yes      DIABETES: No    TOBACCO USE: She has never smoked or used tobacco products      Interval History:  11/5/2020: Clinic visit with PHYLLIS Marie. Patient states that they are feeling well today.  Patient denies fever, chills, nausea, vomiting, lightheadedness, dizziness, shortness of breath and chest pain.  Bilateral stage III pressure ulcers to buttocks debrided in clinic today.  Patient given prescription to [employer] to evaluate for offloading cushion.  Patient has purchased a Roho cushion from the Internet suggested that the patient take the Roho cushion back to [employer] for proper inflation and to determine if it actually is the correct offloading cushion for this patient.    11/11/2020: Clinic visit with PHYLLIS Marie. Patient states that they are feeling well today.  Patient denies fever, chills, nausea, vomiting, lightheadedness, dizziness, shortness of breath and chest pain.  Patient's wounds are slightly smaller in clinic today continue with current plan of care.  Home health order placed today.    12/2/2020: Clinic visit with PHYLLIS Marie. Patient states that they are feeling well today.  Patient denies fever, chills, nausea, vomiting, lightheadedness, dizziness, shortness of breath and chest pain.  Wound to left buttocks has resolved.  Wound to right buttocks is improving and is smaller continue with current plan of care.    12/9/2020: Clinic visit with PHYLLIS Marie. Patient states that they are feeling well today.  Patient denies fever, chills, nausea, vomiting, lightheadedness, dizziness, shortness of breath and chest pain.  Wound is slightly smaller in clinic today however, patient got a superficial skin tear from application of Hyperflix tape.         REVIEW OF SYSTEMS:   Review of Systems   Constitutional: Negative for chills and fever.   Respiratory: Negative for cough and shortness of breath.         Shortness of breath with exertion    Cardiovascular: Positive for leg swelling. Negative for chest pain and claudication.   Gastrointestinal: Negative for constipation, diarrhea, nausea and vomiting.   Genitourinary:        Frequent incontinence of urine  Occasional incontinence of stool  Wears absorptive pad   Musculoskeletal: Positive for back pain and joint pain.        Chronic back and hip pain, debilitating   Psychiatric/Behavioral: Negative for depression. The patient is not nervous/anxious.        PHYSICAL EXAMINATION:     Physical Exam   Constitutional: She is oriented to person, place, and time.   Obese elderly female   HENT:   Head: Normocephalic.   Eyes: Pupils are equal, round, and reactive to light.   Pulmonary/Chest: Effort normal.   Musculoskeletal:         General: Edema present.      Comments: Generalized dependent bilateral lower extremity edema  Uses walker for ambulation   Neurological: She is alert and oriented to person, place, and time.   Skin: Skin is warm.   Ulcer to left buttocks resolved.  Ulcer to right buttocks improving  See doc flowsheets and photos   Psychiatric: Mood, memory, affect and judgment normal.     Wound Assessment               Wound 11/05/20 Full Thickness Wound Buttocks Inner Right -Right Medial Buttock (Active)   Wound Image    12/09/20 0945   Site Assessment [place] 12/09/20 0945   Periwound Assessment Dry;Callused 12/09/20 0945   Margins Attached edges 12/09/20 0945   Closure Secondary intention 11/18/20 1100   Drainage Amount KESHA 12/09/20 0945   Drainage Description KESHA 12/09/20 0945   Treatments Cleansed;Topical Lidocaine;Provider debridement 12/09/20 0945   Wound Cleansing Puracyn [place] 12/09/20 0945   Periwound Protectant Skin Protectant Wipes to Periwound;Barrier Paste 12/09/20 0945   Dressing Cleansing/Solutions Not Applicable 12/09/20 0945   Dressing Options Collagen Dressing;Hydrofera Blue Ready;Hypafix Tape;Transparent Film 12/09/20 0945   Dressing Changed Changed 12/09/20 0945   Dressing Status  Clean;Dry;Intact 12/09/20 0945   Dressing Change/Treatment Frequency Every 72 hrs, and As Needed 12/09/20 0945   Non-staged Wound Description Full thickness 12/09/20 0945   Wound Length (cm) 0.5 cm 12/09/20 0945   Wound Width (cm) 0.5 cm 12/09/20 0945   Wound Depth (cm) 0.1 cm 12/09/20 0945   Wound Surface Area (cm^2) 0.25 cm^2 12/09/20 0945   Wound Volume (cm^3) 0.02 cm^3 12/09/20 0945   Post-Procedure Length (cm) 0.6 cm 12/09/20 0945   Post-Procedure Width (cm) 0.6 cm 12/09/20 0945   Post-Procedure Depth (cm) 0.2 cm 12/09/20 0945   Post-Procedure Surface Area (cm^2) 0.36 cm^2 12/09/20 0945   Post-Procedure Volume (cm^3) 0.07 cm^3 12/09/20 0945   Wound Healing % 97 12/09/20 0945   Wound Bed Epithelium (%) 0 % 11/05/20 0800   Wound Bed Slough (%) 50 % 11/05/20 0800   Wound Bed Eschar (%) 0 % 11/05/20 0800   Tunneling (cm) 0 cm 12/09/20 0945   Undermining (cm) 0 cm 12/09/20 0945   Wound Odor None 12/09/20 0945   Exposed Structures None 12/09/20 0945       PROCEDURE: Excisional debridement of left lower extremity ulcer  -2% viscous lidocaine applied topically to wound bed for approximately 5 minutes prior to debridement  -Curette used to debride wound bed.  Excisional debridement was performed to remove devitalized tissue until healthy, bleeding tissue was visualized.   Entire surface of wound,  0.36 cm2 debrided.  Tissue debrided into the subcutaneous layer.    -Bleeding controlled with manual pressure.   -Wound care completed by wound RN, refer to wound flowsheet   -Patient tolerated the procedure well, without c/o of significant pain or discomfort.          PATIENT EDUCATION  -Etiology of pressure injury  -Importance of offloading  -Strategies for offloading in bed and when seated discussed and demonstrated  - Importance of adequate nutrition for wound healing  -Increase protein intake (unless contraindicated by renal status)   - Advised to go to ER for any increased redness, swelling, drainage or odor, or if  patient develops fever, chills, nausea or vomiting.    ASSESSMENT AND PLAN:     1. Pressure injury of buttock, stage 3, unspecified laterality (HCC)  Comments: Shallow stage III ulcers to bilateral medial buttocks, mirroring each other, kissing wounds.      12/9/2020  -Excisional debridement of wound in clinic today, medically necessary to promote wound healing.  -Patient to return to clinic weekly for assessment and debridement  -Patient to change dressing 1-2 times per week in between clinic visits  -Script to [facility] for seating evaluation and off-loading cushion evaluation given on 11/5/2020.  Discuss with patient next clinic visit if they have followed up on the referral to [employer].  -Educated to increase protein in diet with boost protein or [employer]os wound supplement powder to promote wound healing.   -Educated off-loading with pillows and frequency with 30 minute intervals.       Wound care: Collagen dressing, Hydrofera Blue ready, Hypafix tape, transparent film    2. Age-related physical debility  Comments: Patient does not ambulate much, spends most of her day sitting due to physical weakness and chronic back pain.     12/9/2020 patient and spouse verbalize the patient off-loads with pillows to help alleviate pressure. The patient also walks every hour with walker. Patient is unable to lay supine in bed due to severe back pain this contributes to sheer force, due to the fact the patient is in a recliner for a significant portion of the day.    3. Obesity due to excess calories, unspecified classification, unspecified whether serious comorbidity present  Comments: Contributing factor, patient is severely obese.   12/9/2020: Reiterated to walk every hour.         Please note that this dictation was created using voice recognition software. I have worked with technical experts from [employer] to optimize the interface.  I have made every reasonable attempt to correct obvious errors, but there may be errors  of grammar and possibly content that I did not discover before finalizing the note.

## 2020-12-16 ENCOUNTER — OFFICE VISIT (OUTPATIENT)
Dept: WOUND CARE | Facility: MEDICAL CENTER | Age: 80
End: 2020-12-16
Attending: FAMILY MEDICINE
Payer: MEDICARE

## 2020-12-16 VITALS
SYSTOLIC BLOOD PRESSURE: 132 MMHG | HEART RATE: 94 BPM | TEMPERATURE: 97.5 F | OXYGEN SATURATION: 96 % | DIASTOLIC BLOOD PRESSURE: 71 MMHG | RESPIRATION RATE: 20 BRPM

## 2020-12-16 DIAGNOSIS — E66.09 OBESITY DUE TO EXCESS CALORIES, UNSPECIFIED CLASSIFICATION, UNSPECIFIED WHETHER SERIOUS COMORBIDITY PRESENT: ICD-10-CM

## 2020-12-16 DIAGNOSIS — R54 AGE-RELATED PHYSICAL DEBILITY: ICD-10-CM

## 2020-12-16 DIAGNOSIS — L89.303 PRESSURE INJURY OF BUTTOCK, STAGE 3, UNSPECIFIED LATERALITY (HCC): Primary | ICD-10-CM

## 2020-12-16 PROCEDURE — 11042 DBRDMT SUBQ TIS 1ST 20SQCM/<: CPT | Performed by: NURSE PRACTITIONER

## 2020-12-16 PROCEDURE — 11042 DBRDMT SUBQ TIS 1ST 20SQCM/<: CPT

## 2020-12-16 ASSESSMENT — ENCOUNTER SYMPTOMS
NERVOUS/ANXIOUS: 0
VOMITING: 0
CHILLS: 0
NAUSEA: 0
BACK PAIN: 1
FEVER: 0
DIARRHEA: 0
CLAUDICATION: 0
SHORTNESS OF BREATH: 0
DEPRESSION: 0
CONSTIPATION: 0
COUGH: 0

## 2020-12-16 NOTE — PROGRESS NOTES
Provider Encounter- Pressure Injury    HISTORY OF PRESENT ILLNESS                              START OF CARE IN CLINIC: 11/5/2020               REFERRING PROVIDER: VICTORIANO Montes                 WOUND-pressure ulcer              LOCATION: Patient has bilateral stage III pressure ulcers to her buttocks                                  Shallow stage III, left buttock, mirroring each right buttock wound (kissing  wounds)              WOUND HISTORY: Patient is an 78 year old female who has had wounds to her buttocks off and on since December 2018. She is unsure of how the wounds started, but does state that she has a bad back and is in her recliner chair day and night.  She does get up several times per day to go to the bathroom, but otherwise does not ambulate much.  She also has problems with frequent urinary incontinence, wears absorptive pads.  Her  brings her food, does all the cooking, cleaning and shopping.  She was seen previously for these wounds in the clinic, discharged most recently in June 2020 due to healing.  She has a Roho cushion for offloading for her chair.     11/5/2020: Patient is well-known to [facility] from prior referral.  Patient returns with bilateral stage III pressure ulcers.  Patient has been reports that the patient has been offloading as advised by her PCP she is walking wound every hour.  Patient is offloading with Roho cushion they brought off the Internet.  Patient however due to back pain cannot lay flat that has not laid in bed for years uses a recliner this is the cause of friction and shear and pressure to these wound areas.                  PERTINENT PMH: Obesity, limited mobility, chronic back pain, CAD, bilateral knee replacements, right hip replacement    Contributing factors: Immobility -yes.  Activity level: -Sedentary.  Support surfaces: -Waffle cushion to chair.  Incontinence: No.  Nutritional state: Well-nourished. Caregiver assistance: . Obesity:  Yes. Moisture: Yes      DIABETES: No    TOBACCO USE: She has never smoked or used tobacco products      Interval History:  11/5/2020: Clinic visit with PHYLLIS Marie. Patient states that they are feeling well today.  Patient denies fever, chills, nausea, vomiting, lightheadedness, dizziness, shortness of breath and chest pain.  Bilateral stage III pressure ulcers to buttocks debrided in clinic today.  Patient given prescription to [employer] to evaluate for offloading cushion.  Patient has purchased a Roho cushion from the Internet suggested that the patient take the Roho cushion back to [employer] for proper inflation and to determine if it actually is the correct offloading cushion for this patient.    11/11/2020: Clinic visit with PHYLLIS Marie. Patient states that they are feeling well today.  Patient denies fever, chills, nausea, vomiting, lightheadedness, dizziness, shortness of breath and chest pain.  Patient's wounds are slightly smaller in clinic today continue with current plan of care.  Home health order placed today.    12/2/2020: Clinic visit with PHYLLIS Marie. Patient states that they are feeling well today.  Patient denies fever, chills, nausea, vomiting, lightheadedness, dizziness, shortness of breath and chest pain.  Wound to left buttocks has resolved.  Wound to right buttocks is improving and is smaller continue with current plan of care.    12/9/2020: Clinic visit with PHYLLIS Marie. Patient states that they are feeling well today.  Patient denies fever, chills, nausea, vomiting, lightheadedness, dizziness, shortness of breath and chest pain.  Wound is slightly smaller in clinic today however, patient got a superficial skin tear from application of Hyperflix tape.      12/16/2020: Clinic visit with PHYLLIS Marie. Patient states that they are feeling well today.  Patient denies fever, chills, nausea, vomiting, lightheadedness, dizziness, shortness of  breath and chest pain.  Wound is smaller in clinic today.  We will have the patient come back in 2 weeks to allow for growth of granulation tissue.  Patient has been instructed to apply zinc barrier paste to left buttocks to prevent recurrence of wound to that side.       REVIEW OF SYSTEMS:   Review of Systems   Constitutional: Negative for chills and fever.   Respiratory: Negative for cough and shortness of breath.         Shortness of breath with exertion   Cardiovascular: Positive for leg swelling. Negative for chest pain and claudication.   Gastrointestinal: Negative for constipation, diarrhea, nausea and vomiting.   Genitourinary:        Frequent incontinence of urine  Occasional incontinence of stool  Wears absorptive pad   Musculoskeletal: Positive for back pain and joint pain.        Chronic back and hip pain, debilitating   Psychiatric/Behavioral: Negative for depression. The patient is not nervous/anxious.        PHYSICAL EXAMINATION:     Physical Exam   Constitutional: She is oriented to person, place, and time.   Obese elderly female   HENT:   Head: Normocephalic.   Eyes: Pupils are equal, round, and reactive to light.   Pulmonary/Chest: Effort normal.   Musculoskeletal:         General: Edema present.      Comments: Generalized dependent bilateral lower extremity edema  Uses walker for ambulation   Neurological: She is alert and oriented to person, place, and time.   Skin: Skin is warm.   Ulcer to left buttocks resolved.  Ulcer to right buttocks improving  See doc flowsheets and photos   Psychiatric: Mood, memory, affect and judgment normal.     Wound Assessment             Wound 11/05/20 Full Thickness Wound Buttocks Inner Right -Right Medial Buttock (Active)   Wound Image    12/16/20 1000   Site Assessment [place] 12/16/20 1000   Periwound Assessment Maceration 12/16/20 1000   Margins Attached edges 12/16/20 1000   Closure Secondary intention 11/18/20 1100   Drainage Amount Small 12/16/20 1000   Drainage  Description Serosanguineous 12/16/20 1000   Treatments Cleansed;Topical Lidocaine;Provider debridement;Site care 12/16/20 1000   Wound Cleansing Puracyn Eldridge 12/16/20 1000   Periwound Protectant Skin Protectant Wipes to Periwound;Barrier Paste 12/16/20 1000   Dressing Cleansing/Solutions Not Applicable 12/16/20 1000   Dressing Options Hydrofiber Silver;Dry Gauze;Hypafix Tape 12/16/20 1000   Dressing Changed New 12/16/20 1000   Dressing Status Clean;Dry;Intact 12/16/20 1000   Dressing Change/Treatment Frequency Every 72 hrs, and As Needed 12/09/20 0945   Non-staged Wound Description Full thickness 12/16/20 1000   Wound Length (cm) 0.5 cm 12/16/20 1000   Wound Width (cm) 0.5 cm 12/16/20 1000   Wound Depth (cm) 0.1 cm 12/16/20 1000   Wound Surface Area (cm^2) 0.25 cm^2 12/16/20 1000   Wound Volume (cm^3) 0.02 cm^3 12/16/20 1000   Post-Procedure Length (cm) 0.5 cm 12/16/20 1000   Post-Procedure Width (cm) 0.5 cm 12/16/20 1000   Post-Procedure Depth (cm) 0.2 cm 12/16/20 1000   Post-Procedure Surface Area (cm^2) 0.25 cm^2 12/16/20 1000   Post-Procedure Volume (cm^3) 0.05 cm^3 12/16/20 1000   Wound Healing % 97 12/16/20 1000   Wound Bed Epithelium (%) 0 % 11/05/20 0800   Wound Bed Slough (%) 50 % 11/05/20 0800   Wound Bed Eschar (%) 0 % 11/05/20 0800   Tunneling (cm) 0 cm 12/16/20 1000   Undermining (cm) 0 cm 12/16/20 1000   Wound Odor None 12/16/20 1000   Exposed Structures None 12/16/20 1000         PROCEDURE: Excisional debridement of left lower extremity ulcer  -2% viscous lidocaine applied topically to wound bed for approximately 5 minutes prior to debridement  -Curette used to debride wound bed.  Excisional debridement was performed to remove devitalized tissue until healthy, bleeding tissue was visualized.   Entire surface of wound,  0.25 cm2 debrided.  Tissue debrided into the subcutaneous layer.    -Bleeding controlled with manual pressure.   -Wound care completed by wound RN, refer to wound flowsheet   -Patient  tolerated the procedure well, without c/o of significant pain or discomfort.          PATIENT EDUCATION  -Etiology of pressure injury  -Importance of offloading  -Strategies for offloading in bed and when seated discussed and demonstrated  - Importance of adequate nutrition for wound healing  -Increase protein intake (unless contraindicated by renal status)   - Advised to go to ER for any increased redness, swelling, drainage or odor, or if patient develops fever, chills, nausea or vomiting.    ASSESSMENT AND PLAN:     1. Pressure injury of buttock, stage 3, unspecified laterality (HCC)  Comments: Shallow stage III ulcers to bilateral medial buttocks, mirroring each other, kissing wounds.      12/16/2020  -Excisional debridement of wound in clinic today, medically necessary to promote wound healing.  -Patient to return to clinic in 2 weeks for assessment and debridement  -Patient to change dressing 1-2 times per week in between clinic visits  -Script to [facility] for seating evaluation and off-loading cushion evaluation given on 11/5/2020.  Discuss with patient next clinic visit if they have followed up on the referral to [employer].  -Educated to increase protein in diet with boost protein or Calos wound supplement powder to promote wound healing.   -Educated off-loading with pillows and frequency with 30 minute intervals.       Wound care: Collagen dressing, Hydrofiber silver, dry gauze, Hypafix tape    2. Age-related physical debility  Comments: Patient does not ambulate much, spends most of her day sitting due to physical weakness and chronic back pain.     12/16/2020 patient and spouse verbalize the patient off-loads with pillows to help alleviate pressure. The patient also walks every hour with walker. Patient is unable to lay supine in bed due to severe back pain this contributes to sheer force, due to the fact the patient is in a recliner for a significant portion of the day.    3. Obesity due to excess  calories, unspecified classification, unspecified whether serious comorbidity present  Comments: Contributing factor, patient is severely obese.   12/16/2020: Reiterated to walk every hour.  Or rotate position with the use of pillows.        Please note that this dictation was created using voice recognition software. I have worked with technical experts from [facility] to optimize the interface.  I have made every reasonable attempt to correct obvious errors, but there may be errors of grammar and possibly content that I did not discover before finalizing the note.

## 2020-12-16 NOTE — PATIENT INSTRUCTIONS
-Keep your wound dressing clean, dry, and intact.    -Change your dressing if it becomes soiled, soaked, or falls off.    -Should you experience any significant changes in your wound(s), such as infection (redness, swelling, localized heat, increased pain, fever > 101 F, chills) or have any questions regarding your home care instructions, please contact the wound center at [phone]. If after hours, contact your primary care physician or go to the hospital emergency room.

## 2020-12-16 NOTE — LETTER
December 16, 2020        Donte Alicea [facility]                                [place] R.N.

## 2020-12-18 ENCOUNTER — ANTICOAGULATION MONITORING (OUTPATIENT)
Dept: MEDICAL GROUP | Facility: PHYSICIAN GROUP | Age: 80
End: 2020-12-18

## 2020-12-18 DIAGNOSIS — Z79.01 CHRONIC ANTICOAGULATION: ICD-10-CM

## 2020-12-18 LAB — INR PPP: 2.2 (ref 2–3.5)

## 2020-12-19 DIAGNOSIS — Z79.01 CHRONIC ANTICOAGULATION: ICD-10-CM

## 2020-12-19 NOTE — PROGRESS NOTES
OP Anticoagulation Telephone Note    Date: 2020       Plan:  Spoke with pt on the phone. Pt is therapeutic today. Denies any changes in medications or diet. Denies any s/sx of bleeding or clotting. Will continue warfarin dosing as outlined below and follow-up with pt in 3wks.    Anticoagulation Summary  As of 2020    INR goal:  2.0-3.0   TTR:  73.9 % (5.4 y)   INR used for dosin.20 (2020)   Warfarin maintenance plan:  3.75 mg (2.5 mg x 1.5) every Fri; 2.5 mg (2.5 mg x 1) all other days   Weekly warfarin total:  18.75 mg   Plan last modified:  Massiel Coleman (10/23/2020)   Next INR check:  2021   Target end date:  Indefinite    Indications    Atrial fibrillation (HCC) (Resolved) [I48.91]  Chronic anticoagulation [Z79.01]             Anticoagulation Episode Summary     INR check location:  Home Draw    Preferred lab:      Send INR reminders to:      Comments:  Winston YEAGER      Anticoagulation Care Providers     Provider Role Specialty Phone number    Lizzy Haywood M.D. Referring Cardiology [phone]    [facility] Responsible  [phone]    Vladimir Taylor, PharmD Responsible                PHYLLIS Swanson  [facility]

## 2020-12-21 RX ORDER — WARFARIN SODIUM 2.5 MG/1
TABLET ORAL
Qty: 135 TAB | Refills: 1 | Status: SHIPPED | OUTPATIENT
Start: 2020-12-21 | End: 2021-08-23

## 2020-12-22 ENCOUNTER — APPOINTMENT (OUTPATIENT)
Dept: WOUND CARE | Facility: MEDICAL CENTER | Age: 80
End: 2020-12-22
Attending: FAMILY MEDICINE
Payer: MEDICARE

## 2020-12-23 ENCOUNTER — APPOINTMENT (OUTPATIENT)
Dept: WOUND CARE | Facility: MEDICAL CENTER | Age: 80
End: 2020-12-23
Attending: FAMILY MEDICINE
Payer: MEDICARE

## 2020-12-29 ENCOUNTER — OFFICE VISIT (OUTPATIENT)
Dept: WOUND CARE | Facility: MEDICAL CENTER | Age: 80
End: 2020-12-29
Attending: FAMILY MEDICINE
Payer: MEDICARE

## 2020-12-29 VITALS
SYSTOLIC BLOOD PRESSURE: 142 MMHG | DIASTOLIC BLOOD PRESSURE: 69 MMHG | HEART RATE: 94 BPM | TEMPERATURE: 97.6 F | OXYGEN SATURATION: 95 % | RESPIRATION RATE: 20 BRPM

## 2020-12-29 DIAGNOSIS — E66.09 OBESITY DUE TO EXCESS CALORIES, UNSPECIFIED CLASSIFICATION, UNSPECIFIED WHETHER SERIOUS COMORBIDITY PRESENT: ICD-10-CM

## 2020-12-29 DIAGNOSIS — B37.2 YEAST DERMATITIS: ICD-10-CM

## 2020-12-29 DIAGNOSIS — L89.303 PRESSURE INJURY OF BUTTOCK, STAGE 3, UNSPECIFIED LATERALITY (HCC): Primary | ICD-10-CM

## 2020-12-29 DIAGNOSIS — R54 AGE-RELATED PHYSICAL DEBILITY: ICD-10-CM

## 2020-12-29 PROCEDURE — 99213 OFFICE O/P EST LOW 20 MIN: CPT

## 2020-12-29 PROCEDURE — 99213 OFFICE O/P EST LOW 20 MIN: CPT | Performed by: NURSE PRACTITIONER

## 2020-12-29 RX ORDER — NYSTATIN 100000 [USP'U]/G
1 POWDER TOPICAL 2 TIMES DAILY
Qty: 15 G | Refills: 1 | Status: SHIPPED
Start: 2020-12-29 | End: 2021-11-10

## 2020-12-29 ASSESSMENT — ENCOUNTER SYMPTOMS
SHORTNESS OF BREATH: 0
NERVOUS/ANXIOUS: 0
NAUSEA: 0
FEVER: 0
CLAUDICATION: 0
CHILLS: 0
BACK PAIN: 1
DIARRHEA: 0
CONSTIPATION: 0
COUGH: 0
VOMITING: 0
DEPRESSION: 0

## 2020-12-29 ASSESSMENT — PAIN SCALES - GENERAL: PAINLEVEL: NO PAIN

## 2020-12-29 NOTE — PROGRESS NOTES
Advanced Wound Care   [facility]   [address]   [address]   [place] [address]   [phone] Fax: [phone]    Discharge Note          Wound location: Right buttock  Date of Discharge: 12/29/2020    Assessment:  Discharge patient at this time secondary to wound resolution.    Thank you for the referral and the opportunity to treat your patient.

## 2020-12-29 NOTE — PROGRESS NOTES
Orders for patient discharge from [facility] wound care faxed to [facility] #[phone].

## 2020-12-29 NOTE — PROGRESS NOTES
Provider Encounter- Pressure Injury    HISTORY OF PRESENT ILLNESS                              START OF CARE IN CLINIC: 11/5/2020               REFERRING PROVIDER: VICTORIANO Montes                 WOUND-pressure ulcer              LOCATION: Patient has bilateral stage III pressure ulcers to her buttocks                                  Shallow stage III, left buttock, mirroring each right buttock wound (kissing  wounds)              WOUND HISTORY: Patient is an 78 year old female who has had wounds to her buttocks off and on since December 2018. She is unsure of how the wounds started, but does state that she has a bad back and is in her recliner chair day and night.  She does get up several times per day to go to the bathroom, but otherwise does not ambulate much.  She also has problems with frequent urinary incontinence, wears absorptive pads.  Her  brings her food, does all the cooking, cleaning and shopping.  She was seen previously for these wounds in the clinic, discharged most recently in June 2020 due to healing.  She has a Roho cushion for offloading for her chair.     11/5/2020: Patient is well-known to [facility] from prior referral.  Patient returns with bilateral stage III pressure ulcers.  Patient has been reports that the patient has been offloading as advised by her PCP she is walking wound every hour.  Patient is offloading with Roho cushion they brought off the Internet.  Patient however due to back pain cannot lay flat that has not laid in bed for years uses a recliner this is the cause of friction and shear and pressure to these wound areas.                  PERTINENT PMH: Obesity, limited mobility, chronic back pain, CAD, bilateral knee replacements, right hip replacement    Contributing factors: Immobility -yes.  Activity level: -Sedentary.  Support surfaces: -Waffle cushion to chair.  Incontinence: No.  Nutritional state: Well-nourished. Caregiver assistance: . Obesity:  Yes. Moisture: Yes      DIABETES: No    TOBACCO USE: She has never smoked or used tobacco products      Interval History:  11/5/2020: Clinic visit with PHYLLIS Marie. Patient states that they are feeling well today.  Patient denies fever, chills, nausea, vomiting, lightheadedness, dizziness, shortness of breath and chest pain.  Bilateral stage III pressure ulcers to buttocks debrided in clinic today.  Patient given prescription to [employer] to evaluate for offloading cushion.  Patient has purchased a Roho cushion from the Internet suggested that the patient take the Roho cushion back to [employer] for proper inflation and to determine if it actually is the correct offloading cushion for this patient.    11/11/2020: Clinic visit with PHYLLIS Marie. Patient states that they are feeling well today.  Patient denies fever, chills, nausea, vomiting, lightheadedness, dizziness, shortness of breath and chest pain.  Patient's wounds are slightly smaller in clinic today continue with current plan of care.  Home health order placed today.    12/2/2020: Clinic visit with PHYLLIS Marie. Patient states that they are feeling well today.  Patient denies fever, chills, nausea, vomiting, lightheadedness, dizziness, shortness of breath and chest pain.  Wound to left buttocks has resolved.  Wound to right buttocks is improving and is smaller continue with current plan of care.    12/9/2020: Clinic visit with PHYLLIS Marie. Patient states that they are feeling well today.  Patient denies fever, chills, nausea, vomiting, lightheadedness, dizziness, shortness of breath and chest pain.  Wound is slightly smaller in clinic today however, patient got a superficial skin tear from application of Hyperflix tape.      12/16/2020: Clinic visit with PHYLLIS Marie. Patient states that they are feeling well today.  Patient denies fever, chills, nausea, vomiting, lightheadedness, dizziness, shortness of  breath and chest pain.  Wound is smaller in clinic today.  We will have the patient come back in 2 weeks to allow for growth of granulation tissue.  Patient has been instructed to apply zinc barrier paste to left buttocks to prevent recurrence of wound to that side.    12/29/2020 : Clinic visit with PHYLLIS HolguinLeslye Kent states she is feeling well overall.  Her wound is healed, however rash persists.  She complains of some itching.  States she has been getting up frequently during the day, mostly go to the bathroom.  She also reports that she shifts her weight while sitting, and has a pressure relieving cushion in her chair.       REVIEW OF SYSTEMS:   Review of Systems   Constitutional: Negative for chills and fever.   Respiratory: Negative for cough and shortness of breath.         Shortness of breath with exertion   Cardiovascular: Positive for leg swelling. Negative for chest pain and claudication.   Gastrointestinal: Negative for constipation, diarrhea, nausea and vomiting.   Genitourinary:        Frequent incontinence of urine  Occasional incontinence of stool  Wears absorptive pad   Musculoskeletal: Positive for back pain and joint pain.        Chronic back and hip pain, debilitating   Skin: Positive for itching.        Itching to buttocks   Psychiatric/Behavioral: Negative for depression. The patient is not nervous/anxious.        PHYSICAL EXAMINATION:     Physical Exam   Constitutional: She is oriented to person, place, and time.   Obese elderly female   HENT:   Head: Normocephalic.   Eyes: Pupils are equal, round, and reactive to light.   Pulmonary/Chest: Effort normal.   Musculoskeletal:         General: Edema present.      Comments: Generalized dependent bilateral lower extremity edema  Uses walker for ambulation   Neurological: She is alert and oriented to person, place, and time.   Skin: Skin is warm.   Ulcer to left buttocks resolved.  Ulcer to right buttocks resolved  Rash to bilateral medial  buttocks  Refer to photos and flowsheet   Psychiatric: Mood, memory, affect and judgment normal.     Wound Assessment                       PROCEDURE:   -No debridement today, wound resolved  -Wound care completed by wound RN, refer to wound flowsheet   -Patient tolerated the procedure well, without c/o of significant pain or discomfort.          PATIENT EDUCATION  -Etiology of pressure injury  -Importance of offloading  -Strategies for offloading in bed and when seated discussed and demonstrated  - Importance of adequate nutrition for wound healing  -Increase protein intake (unless contraindicated by renal status)   - Advised to go to ER for any increased redness, swelling, drainage or odor, or if patient develops fever, chills, nausea or vomiting.    ASSESSMENT AND PLAN:     1. Pressure injury of buttock, stage 3, unspecified laterality (HCC)  Comments: Shallow stage III ulcers to bilateral medial buttocks, mirroring each other, kissing wounds.      12/29/2020: Wounds are essentially resolved, she rash to medial buttocks noted.  Suspect yeast dermatitis  -Discharge from [facility]  -Patient to return to clinic ASAP if wound recurs, or if he/she develops new wounds  -Rx for Nystatin sent to patient's pharmacy (not able to prescribe Lotrimin due to patient's allergies)    Wound care: Patient advised to apply zinc barrier paste several times per day, add nystatin until rash resolved.    2. Age-related physical debility  Comments: Patient does not ambulate much, spends most of her day sitting due to physical weakness and chronic back pain.     12/29/2020 patient and spouse verbalize the patient off-loads with pillows to help alleviate pressure. The patient also walks every hour with walker. Patient is unable to lay supine in bed due to severe back pain this contributes to sheer force, due to the fact the patient is in a recliner for a significant portion of the day.    3. Obesity due to excess calories, unspecified  classification, unspecified whether serious comorbidity present  Comments: Contributing factor, patient is severely obese.  Weight loss likely very difficult patient given back pain issues and advanced age.      12/29/2020: Reiterated to walk every hour.  Or rotate position with the use of pillows.    Patient was seen for 15 minutes face to face of which > 50% of appointment time was spent on counseling and coordination of care regarding the above.      Please note that this dictation was created using voice recognition software. I have worked with technical experts from [facility] to optimize the interface.  I have made every reasonable attempt to correct obvious errors, but there may be errors of grammar and possibly content that I did not discover before finalizing the note.

## 2021-01-04 ENCOUNTER — ANTICOAGULATION MONITORING (OUTPATIENT)
Dept: VASCULAR LAB | Facility: MEDICAL CENTER | Age: 81
End: 2021-01-04

## 2021-01-04 DIAGNOSIS — Z79.01 CHRONIC ANTICOAGULATION: ICD-10-CM

## 2021-01-04 LAB — INR PPP: 2.1 (ref 2–3.5)

## 2021-01-05 ENCOUNTER — APPOINTMENT (OUTPATIENT)
Dept: WOUND CARE | Facility: MEDICAL CENTER | Age: 81
End: 2021-01-05
Attending: FAMILY MEDICINE
Payer: MEDICARE

## 2021-01-05 NOTE — PROGRESS NOTES
OP Anticoagulation Service Note    Date: 2021    Anticoagulation Summary  As of 2021    INR goal:  2.0-3.0   TTR:  74.1 % (5.5 y)   INR used for dosin.10 (2021)   Warfarin maintenance plan:  3.75 mg (2.5 mg x 1.5) every Fri; 2.5 mg (2.5 mg x 1) all other days   Weekly warfarin total:  18.75 mg   Plan last modified:  Massiel Coleman (10/23/2020)   Next INR check:     Target end date:  Indefinite    Indications    Atrial fibrillation (HCC) (Resolved) [I48.91]  Chronic anticoagulation [Z79.01]             Anticoagulation Episode Summary     INR check location:  Home Draw    Preferred lab:      Send INR reminders to:      Comments:  Winston YEAGER      Anticoagulation Care Providers     Provider Role Specialty Phone number    Lizzy Haywood M.D. Referring Cardiology [phone]    [facility] Anticoagulation Services Responsible  [phone]    Vladimir Taylor, PharmD Responsible          Anticoagulation Patient Findings      HPI:     This appointment was conducted over the phone today.    The reason for today's call is to prevent morbidity and mortality from a blood clot and/or stroke and to reduce the risk of bleeding while on a anticoagulant.     PCP:  Rosi Mendoza A.P.R.N.  [address] [id number] [phone]    Assessment:     • INR  therapeutic.   • Confirmed warfarin dosing regimen: Yes  • Interval Changes with foods rich in vitamin K: No  • Interval Changes in ETOH or cranberries:   No  • Interval Changes in smoking status:  No  • S/S of bleeding or bruising:  No  • Signs/symptoms  thrombosis since the last appt:  No  • Any upcoming procedures that require stopping warfarin and/or using bridge therapy: None  • Interval Changes in medication:  No   • Is pt on antiplatelet therapy. No      Current Outpatient Medications:   •  nystatin, 1 Application, Topical, BID  •  warfarin, TAKE ONE TO ONE AND ONE-HALF TABLETS BY MOUTH EVERY DAY AS DIRECTED BY THE [facility] ANTICOAGULATION  CLINIC  •  sertraline, 50 mg, Oral, DAILY  •  Myrbetriq, 50 mg, Oral, DAILY  •  ipratropium, 2 Spray, Nasal, Q12HRS  •  levothyroxine, TAKE 1 TABLET BY MOUTH EVERY MORNING ON A EMPTY STOMACH  •  estradiol, 2 mg, Oral, QDAY  •  spironolactone, 50 mg, Oral, BID  •  magnesium oxide, 400 mg, Oral, DAILY  •  TRIAD HYDROPHILIC WOUND DRESSI, 1 Application, Apply externally, BID  •  nitrofurantoin, 50 mg, Oral, DAILY  •  lisinopril, 5 mg, Oral, QDAY  •  atenolol, 50 mg, Oral, BID  •  Triamcinolone Acetonide, 1 Squirt, Apply externally, BID  •  Acetaminophen, 2 Cap, Oral, TID PRN  •  PREBIOTIC PRODUCT PO, 2 Tab, Oral, DAILY  •  Lutein, 1 Cap, Oral, DAILY  •  vitamin D, 2,000 Units, Oral, DAILY      Plan:     • Pt is to continue with current warfarin dosing regimen.      Follow-up:     • Our protocol suggests we test in 2 weeks.        • This decision was made using shared decision making with the pt and benefits vs risks were discussed.      Additional information discussed with patient:     • Pt educated to contact our clinic with any changes in medications or s/s of bleeding or thrombosis.  • Education was provided today regarding tips to reduce their bleed risk and dietary constraints while on a anticoagulant.    National recommendations regarding anticoagulation therapy:     The CHEST guidelines recommends frequent INR monitoring at regular intervals (a few days up to a max of 12 weeks) to ensure patients are on the proper dose of warfarin, and patients are not having any complications from therapy.  INRs can dramatically change over a short time period due to diet, medications, and medical conditions.     Stevie Harding, Pharmacy Intern  [facility]  Phone [phone] fax [phone]    This note was created using voice recognition software (Dragon). The accuracy of the dictation is limited by the abilities of the software. I have reviewed the note prior to signing, however some  errors in grammar and context are still possible. If you have any questions related to this note please do not hesitate to contact our office.

## 2021-01-07 DIAGNOSIS — L29.9 ITCHING OF EAR: ICD-10-CM

## 2021-01-07 RX ORDER — TRIAMCINOLONE ACETONIDE 0.25 MG/ML
1 LOTION TOPICAL 2 TIMES DAILY
Qty: 60 ML | Refills: 0 | Status: SHIPPED | OUTPATIENT
Start: 2021-01-07 | End: 2022-01-31

## 2021-01-08 NOTE — TELEPHONE ENCOUNTER
Received request via: Pharmacy    Was the patient seen in the last year in this department? Yes LOV 10/29/20    Does the patient have an active prescription (recently filled or refills available) for medication(s) requested? No

## 2021-01-08 NOTE — TELEPHONE ENCOUNTER
Requested Prescriptions     Signed Prescriptions Disp Refills   • Triamcinolone Acetonide 0.025 % Lotion 60 mL 0     Sig: Apply 1 Squirt topically 2 Times a Day. Apply to itching ears.     Authorizing Provider: NORBERT PARADA A.P.R.N.

## 2021-01-11 DIAGNOSIS — Z23 NEED FOR VACCINATION: ICD-10-CM

## 2021-01-12 ENCOUNTER — APPOINTMENT (OUTPATIENT)
Dept: WOUND CARE | Facility: MEDICAL CENTER | Age: 81
End: 2021-01-12
Attending: NURSE PRACTITIONER
Payer: MEDICARE

## 2021-01-15 ENCOUNTER — ANTICOAGULATION MONITORING (OUTPATIENT)
Dept: VASCULAR LAB | Facility: MEDICAL CENTER | Age: 81
End: 2021-01-15

## 2021-01-15 ENCOUNTER — ANTICOAGULATION MONITORING (OUTPATIENT)
Dept: MEDICAL GROUP | Facility: PHYSICIAN GROUP | Age: 81
End: 2021-01-15

## 2021-01-15 DIAGNOSIS — Z79.01 CHRONIC ANTICOAGULATION: ICD-10-CM

## 2021-01-15 LAB
INR PPP: 2.4 (ref 2–3.5)
INR PPP: 2.4 (ref 2–3.5)

## 2021-01-15 NOTE — TELEPHONE ENCOUNTER
You may want to Change to a different medication due to cost $94.00.    Received request via: Pharmacy    Was the patient seen in the last year in this department? Yes LOV10/29/2020    Does the patient have an active prescription (recently filled or refills available) for medication(s) requested? No

## 2021-01-16 NOTE — TELEPHONE ENCOUNTER
Requested Prescriptions     Refused Prescriptions Disp Refills   • terconazole (TERAZOL 3) 0.8 % vaginal cream 20 g 0     Sig: Insert 1 Applicator into the vagina 2 Times a Day. Pt applying to feet and bottom     Refused By: NORBERT PARADA     Reason for Refusal: Refill not appropriate.     TIMUR Montes.

## 2021-01-16 NOTE — PROGRESS NOTES
OP Anticoagulation Service Note    Date: 1/15/2021    Anticoagulation Summary  As of 1/15/2021    INR goal:  2.0-3.0   TTR:  74.3 % (5.5 y)   INR used for dosin.40 (1/15/2021)   Warfarin maintenance plan:  3.75 mg (2.5 mg x 1.5) every Fri; 2.5 mg (2.5 mg x 1) all other days   Weekly warfarin total:  18.75 mg   Plan last modified:  Massiel Coleman (10/23/2020)   Next INR check:  2021   Target end date:  Indefinite    Indications    Atrial fibrillation (HCC) (Resolved) [I48.91]  Chronic anticoagulation [Z79.01]             Anticoagulation Episode Summary     INR check location:  Home Draw    Preferred lab:      Send INR reminders to:      Comments:  Winston YEAGER      Anticoagulation Care Providers     Provider Role Specialty Phone number    Lizzy Haywood M.D. Referring Cardiology [phone]    [facility] Anticoagulation Services Responsible  [phone]    Vladimir Taylor, PharmD Responsible          Anticoagulation Patient Findings  Patient Findings     Negatives:  Signs/symptoms of thrombosis, Signs/symptoms of bleeding, Laboratory test error suspected, Change in health, Change in alcohol use, Change in activity, Upcoming invasive procedure, Emergency department visit, Upcoming dental procedure, Missed doses, Extra doses, Change in medications, Change in diet/appetite, Hospital admission, Bruising, Other complaints          HPI:     This appointment was conducted over the phone today.    The reason for today's call is to prevent morbidity and mortality from a blood clot and/or stroke and to reduce the risk of bleeding while on a anticoagulant.     PCP:  QUOC MontesRLeslyeNLeslye  [address] [id number] [phone]    Assessment:     • INR  therapeutic.   • Confirmed warfarin dosing regimen: Yes  • Interval Changes with foods rich in vitamin K: No  • Interval Changes in ETOH or cranberries:   No  • Interval Changes in smoking status:  No  • S/S of bleeding or bruising:   No  • Signs/symptoms  thrombosis since the last appt:  No  • Any upcoming procedures that require stopping warfarin and/or using bridge therapy: None  • Interval Changes in medication:  No         Current Outpatient Medications:   •  Triamcinolone Acetonide, 1 Squirt, Apply externally, BID  •  nystatin, 1 Application, Topical, BID  •  warfarin, TAKE ONE TO ONE AND ONE-HALF TABLETS BY MOUTH EVERY DAY AS DIRECTED BY THE [facility] ANTICOAGULATION CLINIC  •  sertraline, 50 mg, Oral, DAILY  •  Myrbetriq, 50 mg, Oral, DAILY  •  ipratropium, 2 Spray, Nasal, Q12HRS  •  levothyroxine, TAKE 1 TABLET BY MOUTH EVERY MORNING ON A EMPTY STOMACH  •  estradiol, 2 mg, Oral, QDAY  •  spironolactone, 50 mg, Oral, BID  •  magnesium oxide, 400 mg, Oral, DAILY  •  TRIAD HYDROPHILIC WOUND DRESSI, 1 Application, Apply externally, BID  •  nitrofurantoin, 50 mg, Oral, DAILY  •  lisinopril, 5 mg, Oral, QDAY  •  atenolol, 50 mg, Oral, BID  •  Acetaminophen, 2 Cap, Oral, TID PRN  •  PREBIOTIC PRODUCT PO, 2 Tab, Oral, DAILY  •  Lutein, 1 Cap, Oral, DAILY  •  vitamin D, 2,000 Units, Oral, DAILY      Plan:     • Pt is to continue with current warfarin dosing regimen.    Follow-up:     • Our protocol suggests we test in 2 weeks.      • This decision was made using shared decision making with the pt and benefits vs risks were discussed.      Additional information discussed with patient:     • Pt educated to contact our clinic with any changes in medications or s/s of bleeding or thrombosis.  • Education was provided today regarding tips to reduce their bleed risk and dietary constraints while on a anticoagulant.    National recommendations regarding anticoagulation therapy:     The CHEST guidelines recommends frequent INR monitoring at regular intervals (a few days up to a max of 12 weeks) to ensure patients are on the proper dose of warfarin, and patients are not having any complications from therapy.  INRs can dramatically change over a short time  period due to diet, medications, and medical conditions.     Stevie Harding, Pharmacy Intern  [facility]  Phone [phone] fax [phone]    This note was created using voice recognition software (Dragon). The accuracy of the dictation is limited by the abilities of the software. I have reviewed the note prior to signing, however some errors in grammar and context are still possible. If you have any questions related to this note please do not hesitate to contact our office.

## 2021-01-29 ENCOUNTER — HOSPITAL ENCOUNTER (OUTPATIENT)
Dept: LAB | Facility: MEDICAL CENTER | Age: 81
End: 2021-01-29
Attending: NURSE PRACTITIONER
Payer: MEDICARE

## 2021-01-29 ENCOUNTER — ANTICOAGULATION MONITORING (OUTPATIENT)
Dept: VASCULAR LAB | Facility: MEDICAL CENTER | Age: 81
End: 2021-01-29

## 2021-01-29 ENCOUNTER — OFFICE VISIT (OUTPATIENT)
Dept: MEDICAL GROUP | Facility: PHYSICIAN GROUP | Age: 81
End: 2021-01-29
Payer: MEDICARE

## 2021-01-29 VITALS
RESPIRATION RATE: 20 BRPM | WEIGHT: 199 LBS | SYSTOLIC BLOOD PRESSURE: 102 MMHG | DIASTOLIC BLOOD PRESSURE: 60 MMHG | OXYGEN SATURATION: 98 % | TEMPERATURE: 97 F | BODY MASS INDEX: 34.16 KG/M2 | HEART RATE: 92 BPM
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
## 2021-01-29 DIAGNOSIS — R60.0 BILATERAL LEG EDEMA: ICD-10-CM

## 2021-01-29 DIAGNOSIS — L89.301 PRESSURE INJURY OF BUTTOCK, STAGE 1, UNSPECIFIED LATERALITY: ICD-10-CM

## 2021-01-29 DIAGNOSIS — Z79.01 CHRONIC ANTICOAGULATION: ICD-10-CM

## 2021-01-29 DIAGNOSIS — S09.90XA ACUTE HEAD TRAUMA, INITIAL ENCOUNTER: ICD-10-CM

## 2021-01-29 LAB
BASOPHILS # BLD AUTO: 0.9 % (ref 0–1.8)
BASOPHILS # BLD: 0.1 K/UL (ref 0–0.12)
EOSINOPHIL # BLD AUTO: 0.37 K/UL (ref 0–0.51)
EOSINOPHIL NFR BLD: 3.4 % (ref 0–6.9)
ERYTHROCYTE [DISTWIDTH] IN BLOOD BY AUTOMATED COUNT: 50.1 FL (ref 35.9–50)
HCT VFR BLD AUTO: 46.6 % (ref 37–47)
HGB BLD-MCNC: 14.6 G/DL (ref 12–16)
IMM GRANULOCYTES # BLD AUTO: 0.06 K/UL (ref 0–0.11)
IMM GRANULOCYTES NFR BLD AUTO: 0.6 % (ref 0–0.9)
INR PPP: 3 (ref 2–3.5)
LYMPHOCYTES # BLD AUTO: 2.86 K/UL (ref 1–4.8)
LYMPHOCYTES NFR BLD: 26.3 % (ref 22–41)
MCH RBC QN AUTO: 31.9 PG (ref 27–33)
MCHC RBC AUTO-ENTMCNC: 31.3 G/DL (ref 33.6–35)
MCV RBC AUTO: 101.7 FL (ref 81.4–97.8)
MONOCYTES # BLD AUTO: 0.86 K/UL (ref 0–0.85)
MONOCYTES NFR BLD AUTO: 7.9 % (ref 0–13.4)
NEUTROPHILS # BLD AUTO: 6.64 K/UL (ref 2–7.15)
NEUTROPHILS NFR BLD: 60.9 % (ref 44–72)
NRBC # BLD AUTO: 0 K/UL
NRBC BLD-RTO: 0 /100 WBC
PLATELET # BLD AUTO: 273 K/UL (ref 164–446)
PMV BLD AUTO: 10.9 FL (ref 9–12.9)
RBC # BLD AUTO: 4.58 M/UL (ref 4.2–5.4)
WBC # BLD AUTO: 10.9 K/UL (ref 4.8–10.8)

## 2021-01-29 PROCEDURE — 36415 COLL VENOUS BLD VENIPUNCTURE: CPT

## 2021-01-29 PROCEDURE — 99215 OFFICE O/P EST HI 40 MIN: CPT | Performed by: NURSE PRACTITIONER

## 2021-01-29 PROCEDURE — 83880 ASSAY OF NATRIURETIC PEPTIDE: CPT

## 2021-01-29 PROCEDURE — 85025 COMPLETE CBC W/AUTO DIFF WBC: CPT

## 2021-01-29 PROCEDURE — 80053 COMPREHEN METABOLIC PANEL: CPT

## 2021-01-29 PROCEDURE — 8041 PR SCP AHA: Performed by: NURSE PRACTITIONER

## 2021-01-29 ASSESSMENT — FIBROSIS 4 INDEX: FIB4 SCORE: 1.28

## 2021-01-29 NOTE — ASSESSMENT & PLAN NOTE
New problem to examiner.  She reports that she fell 5 days ago and hit her head.  She is not having any pain but does have a bump on the top of her head that is tender to touch.  She has not been seen for this.  She is on chronic warfarin therapy.

## 2021-01-29 NOTE — PROGRESS NOTES
Annual Health Assessment Questions:    1.  Are you currently engaging in any exercise or physical activity? No    2.  How would you describe your mood or emotional well-being today? anxious    3.  Have you had any falls in the last year? Yes    4.  Have you noticed any problems with your balance or had difficulty walking? No    5.  In the last six months have you experienced any leakage of urine? Yes    6. DPA/Advanced Directive: Patient has Advanced Directive, but it is not on file. Instructed to bring in a copy to scan into their chart.      Subjective:     CC: wound check, fall, leg swelling    HPI:   Donte presents today with her  Woody for the following:    Acute head trauma, initial encounter  New problem to examiner.  She reports that she fell 5 days ago and hit her head.  She is not having any pain but does have a bump on the top of her head that is tender to touch.  She has not been seen for this.  She is on chronic warfarin therapy.      Bilateral leg edema  New problem to examiner.  She reports chronic ongoing problem and is unsure of exactly when the leg swelling did start.  She states that 2 days ago is when she she noticed bilateral leg swelling. She does not have any pain. She does have chronic orthopnea. She was previously on lasix.  She is currently on spironolactone 50 mg twice daily.  She is on chronic warfarin therapy.  Denies chest pain, shortness of breath, difficulty breathing, worsening orthopnea, fevers, chills, long trips, blurry vision, headache, or confusion.    Chronic anticoagulation  Chronic medical problem.  She reports that her INR today 3.0.  Her and her  state that they have reached out to the anticoagulation clinic and are awaiting a response.  She continues with her warfarin.  She had recent fall 5 days ago. She hit her head on the carpet and has bumped to the crown of her head.  She has not been evaluated for this fall.    Pressure injury of buttock, stage  "1  Chronic medical problem.  She has completed her outpatient wound care therapy.   reports that they have not been applying skin protectant to the area and he noticed that one area was starting to have increased redness.  She is trying to change positions frequently.  She does remain mostly sedentary.    Falls, initial encounter  New problem to examiner.  Patient reports that 5 days ago she was using a cane and trying to switch to crutches, lost her balance and fell backwards.  She did hit her head on the carpet.  She does not remember if she hit her head on any objects when falling.  Her  states that the area that she fell there is no furniture other than the carpet.  She has not been seen for this.  She is on chronic warfarin therapy.  She reports her INR today is 3.0 and has call out to the anticoagulation clinic and is awaiting callback.  She denies any headache, confusion, weakness on one side of the body, difficulty speaking, dysphagia, chest pain, shortness of breath, calf pain, or difficulty breathing.  She does have a bump on the top of her head from the fall.      Past Medical History:   Diagnosis Date   • A-fib (HCC)    • Anesthesia     \"Tachycardia for 5 days after cataract surgery\"   • Anticoagulant long-term use 1/12/2012   • Arthritis     osteo-Knees, hips   • Atrial fibrillation (HCC)    • Backpain     R hip   • Bowel habit changes     diarrhea   • Breath shortness     with exertion, prn O2 2L   • Bronchitis Nov, 2013   • CAD (coronary artery disease)    • Depression    • Glaucoma 5/3/2011   • Hematoma complicating a procedure 11/3/2012   • Hemorrhagic disorder ([facility])     bruising/coumadin   • Hypertension    • Hypothyroid    • Lupus (HCC)    • Macular degeneration    • Menopause 1/12/2012   • Mitral regurgitation 10/30/2012   • Obesity 1/12/2012   • Pacemaker 2018   • Pneumonia feb,2013   • Pre-syncope 6/29/2018   • Pulmonary hypertension (HCC) 10/30/2012   • PVC's (premature " ventricular contractions) 1/12/2012   • Senile nuclear sclerosis    • Spinal stenosis of lumbar region at multiple levels    • Unspecified cataract     repaired bilateral   • Unspecified urinary incontinence    • Urinary bladder disorder        Social History     Tobacco Use   • Smoking status: Never Smoker   • Smokeless tobacco: Never Used   Substance Use Topics   • Alcohol use: No   • Drug use: No       Current Outpatient Medications Ordered in Epic   Medication Sig Dispense Refill   • warfarin (COUMADIN) 2.5 MG Tab TAKE ONE TO ONE AND ONE-HALF TABLETS BY MOUTH EVERY DAY [facility] Tab 1   • sertraline (ZOLOFT) 50 MG Tab Take 1 Tab by mouth every day. 90 Tab 3   • Mirabegron ER (MYRBETRIQ) 50 MG TABLET SR 24 HR Take 50 mg by mouth every day. 90 Tab 3   • ipratropium (ATROVENT) 0.03 % Solution Administer 2 Sprays into affected nostril(S) every 12 hours. 30 mL 2   • levothyroxine (SYNTHROID) 50 MCG Tab TAKE 1 TABLET BY MOUTH EVERY MORNING ON A EMPTY STOMACH 90 Tab 3   • estradiol (ESTRACE) 2 MG Tab Take 1 Tab by mouth every day. 90 Tab 2   • spironolactone (ALDACTONE) 50 MG Tab Take 1 Tab by mouth 2 times a day. 180 Tab 2   • magnesium oxide (MAG-OX) 400 MG Tab tablet Take 400 mg by mouth every day.     • lisinopril (PRINIVIL) 5 MG Tab Take 1 Tab by mouth every day. 100 Tab 3   • atenolol (TENORMIN) 50 MG Tab Take 1 Tab by mouth 2 times a day. 200 Tab 3   • Acetaminophen 500 MG Cap Take 2 Caps by mouth 3 times a day as needed. Indications: Pain     • PREBIOTIC PRODUCT PO Take 2 Tabs by mouth every day.     • Lutein 20 MG Cap Take 1 Cap by mouth every day. 90 Cap 3   • vitamin D (CHOLECALCIFEROL) 1000 UNIT TABS Take 2,000 Units by mouth every day.     • Triamcinolone Acetonide 0.025 % Lotion Apply 1 Squirt topically 2 Times a Day. Apply to itching ears. 60 mL 0   • nystatin (MYCOSTATIN) powder Apply 1 g topically 2 Times a Day. 15 g 1   • Wound Dressings (TRIAD HYDROPHILIC  WOUND DRESSI) Paste 1 Application by Apply externally route 2 times a day. 71 g 2   • nitrofurantoin (MACROBID) 100 MG Cap Take 50 mg by mouth every day.       No current [facility]-ordered facility-administered medications on file.        Allergies:  Amiodarone, Bactrim, Cipro xr, Metoprolol, Morphine, Phytoplex z-guard [petrolatum-zinc oxide], Pseudoephedrine, Qvar [beclomethasone dipropionate], Vibramycin, Atorvastatin calcium-polysorbate 80, Augmentin, Diltiazem, Flecainide, Keflex, Mucinex, Tramadol, Atorvastatin, and Tape    Health Maintenance: Deferred    ROS:  Per HPI above  Gen: no fevers/chills, no changes in weight  Eyes: no changes in vision  ENT: no sore throat, no hearing loss, no bloody nose  Pulm: no sob, no cough  CV: no chest pain, no palpitations  GI: no nausea/vomiting, no diarrhea  : no dysuria  MSk: no myalgias  Skin: no rash  Neuro: no headaches, no numbness/tingling  Heme/Lymph: no easy bruising      Objective:     Vital signs   Exam:  /60   Pulse 92   Temp 36.1 °C (97 °F) (Temporal)   Resp 20   Wt 90.3 kg (199 lb)   LMP 01/01/1993   SpO2 98%   BMI 34.16 kg/m²  Body mass index is 34.16 kg/m².    Gen:  No apparent distress.  present in exam room.   Head:   Beckley of head has palpable hematoma that is tender touch, raised, red.  Neuro:  CN II-XII intact. Alert and oriented to name, place, time, and situation.  Eyes:   Glasses in place. PERRLA.   Neck: Neck is supple without lymphadenopathy.  Lungs: Normal effort, CTA bilaterally, no wheezes, rhonchi, or rales  CV: Regular rate and rhythm. No murmurs, rubs, or gallops.  Msk:     Upper extremity strength 5/5 and equal.  Lower extremity strength 5/5 and equal.    Ext: No clubbing and cyanosis. 3+ bilateral pitting edema BLE with shiny skin.  No calf pain with palpation.  Bilateral buttocks at the crease has healed pressure ulcer, skin is blanchable, no open lesions at this time.      Assessment & Plan:     80 y.o. female with the  following -     1. Acute head trauma, initial encounter  2. Fall, initial encounter  New problem to examiner.  Neuro exam is benign at this appointment.  Given her chronic warfarin use will check head CT rule out any bleed.  MA was able to schedule CT of her head appointment for 2/4/2021.  Patient will complete labs as ordered in #3 today.  She will follow-up with anticoagulation clinic.  I discussed at length red flag signs and symptoms with both her and her  and discussed that if she develops that she should go to the emergency room right away or call 911.  They both verbalized understanding.  Monitor and follow.  - CT-HEAD W/O; Future    3. Bilateral leg edema  New problem to examiner.  Lung exam clear today.  Will check ultrasound rule any DVT although with her anticoagulation at goal I believe this is less likely but nevertheless will check ultrasound.  Will check echocardiogram and BNP ruled any heart failure.  Will check updated kidney function.  Red flags discussed with strict ER precautions.  Both patient has been verbalized understanding.  She will continue with her spironolactone at this time.  Monitor and follow.  - US-EXTREMITY VENOUS LOWER BILAT; Future  - EC-ECHOCARDIOGRAM COMPLETE W/O CONT; Future  - CBC WITH DIFFERENTIAL; Future  - Comp Metabolic Panel; Future  - proBrain Natriuretic Peptide, NT; Future    4. Chronic anticoagulation  Chronic unstable medical problem.  INR today self-reported by patient is 3.0 today.  She is awaiting callback from anticoagulation clinic for her next dosage.  We discussed signs and symptoms of bleeding, she verbalized understanding.  She denies any acute signs symptoms of bleeding today.  Will check head CT rule any head bleed see #1 and see #2.  Monitor and follow.  - CT-HEAD W/O; Future    5. Pressure injury of buttock, stage 1, unspecified laterality  Chronic stable medical problem. Discussed frequent repositionings. Change sanitary pad frequently. Apply  skin protectant as needed. Monitor and follow.     Return in about 2 weeks (around 2/12/2021).    Total 40 minutes face-to-face time spent with patient, with greater than 50% of the total time discussing patient's issues and symptoms as listed above in assessment and plan, as well as managing coordination of care for future evaluation and treatment.      Please note that this dictation was created using voice recognition software. I have made every reasonable attempt to correct obvious errors, but I expect that there are errors of grammar and possibly content that I did not discover before finalizing the note.

## 2021-01-29 NOTE — PROGRESS NOTES
Anticoagulation Summary  As of 1/29/2021    INR goal:  2.0-3.0   TTR:  74.4 % (5.5 y)   INR used for dosing:  3.00 (1/29/2021)   Warfarin maintenance plan:  3.75 mg (2.5 mg x 1.5) every Fri; 2.5 mg (2.5 mg x 1) all other days   Weekly warfarin total:  18.75 mg   Plan last modified:  Massiel Coleman (10/23/2020)   Next INR check:  2/12/2021   Target end date:  Indefinite    Indications    Atrial fibrillation (HCC) (Resolved) [I48.91]  Chronic anticoagulation [Z79.01]             Anticoagulation Episode Summary     INR check location:  Home Draw    Preferred lab:      Send INR reminders to:      Comments:  Winston YEAGER      Anticoagulation Care Providers     Provider Role Specialty Phone number    Lizzy Haywood M.D. Referring Cardiology [phone]    [facility] Anticoagulation Services Responsible  [phone]    Vladimir Taylor, PharmD Responsible          Anticoagulation Patient Findings     Left voicemail message to report a therapeutic INR of 3.0.  Pt to continue with current warfarin dosing regimen. Requested pt contact the clinic for any s/s of unusual bleeding, bruising, clotting or any changes to diet or medication. FU 2 weeks.  Vladimir Taylor, PharmD, BCACP

## 2021-01-29 NOTE — ASSESSMENT & PLAN NOTE
New problem to examiner.  She reports chronic ongoing problem and is unsure of exactly when the leg swelling did start.  She states that 2 days ago is when she she noticed bilateral leg swelling. She does not have any pain. She does have chronic orthopnea. She was previously on lasix.  She is currently on spironolactone 50 mg twice daily.  She is on chronic warfarin therapy.  Denies chest pain, shortness of breath, difficulty breathing, worsening orthopnea, fevers, chills, long trips, blurry vision, headache, or confusion.

## 2021-01-29 NOTE — ASSESSMENT & PLAN NOTE
New problem to examiner.  Patient reports that 5 days ago she was using a cane and trying to switch to crutches, lost her balance and fell backwards.  She did hit her head on the carpet.  She does not remember if she hit her head on any objects when falling.  Her  states that the area that she fell there is no furniture other than the carpet.  She has not been seen for this.  She is on chronic warfarin therapy.  She reports her INR today is 3.0 and has call out to the anticoagulation clinic and is awaiting callback.  She denies any headache, confusion, weakness on one side of the body, difficulty speaking, dysphagia, chest pain, shortness of breath, calf pain, or difficulty breathing.  She does have a bump on the top of her head from the fall.

## 2021-01-29 NOTE — ASSESSMENT & PLAN NOTE
Chronic medical problem.  She reports that her INR today 3.0.  Her and her  state that they have reached out to the anticoagulation clinic and are awaiting a response.  She continues with her warfarin.  She had recent fall 5 days ago. She hit her head on the carpet and has bumped to the crown of her head.  She has not been evaluated for this fall.

## 2021-01-29 NOTE — ASSESSMENT & PLAN NOTE
Chronic medical problem.  She has completed her outpatient wound care therapy.   reports that they have not been applying skin protectant to the area and he noticed that one area was starting to have increased redness.  She is trying to change positions frequently.  She does remain mostly sedentary.

## 2021-01-30 LAB
ALBUMIN SERPL BCP-MCNC: 4.1 G/DL (ref 3.2–4.9)
ALBUMIN/GLOB SERPL: 1.3 G/DL
ALP SERPL-CCNC: 65 U/L (ref 30–99)
ALT SERPL-CCNC: 9 U/L (ref 2–50)
ANION GAP SERPL CALC-SCNC: 8 MMOL/L (ref 7–16)
AST SERPL-CCNC: 18 U/L (ref 12–45)
BILIRUB SERPL-MCNC: 0.3 MG/DL (ref 0.1–1.5)
BUN SERPL-MCNC: 19 MG/DL (ref 8–22)
CALCIUM SERPL-MCNC: 10 MG/DL (ref 8.5–10.5)
CHLORIDE SERPL-SCNC: 102 MMOL/L (ref 96–112)
CO2 SERPL-SCNC: 26 MMOL/L (ref 20–33)
CREAT SERPL-MCNC: 1.06 MG/DL (ref 0.5–1.4)
GLOBULIN SER CALC-MCNC: 3.2 G/DL (ref 1.9–3.5)
GLUCOSE SERPL-MCNC: 95 MG/DL (ref 65–99)
NT-PROBNP SERPL IA-MCNC: 630 PG/ML (ref 0–125)
POTASSIUM SERPL-SCNC: 5.5 MMOL/L (ref 3.6–5.5)
PROT SERPL-MCNC: 7.3 G/DL (ref 6–8.2)
SODIUM SERPL-SCNC: 136 MMOL/L (ref 135–145)

## 2021-02-04 ENCOUNTER — HOSPITAL ENCOUNTER (OUTPATIENT)
Dept: RADIOLOGY | Facility: MEDICAL CENTER | Age: 81
End: 2021-02-04
Attending: NURSE PRACTITIONER
Payer: MEDICARE

## 2021-02-04 DIAGNOSIS — S09.90XA ACUTE HEAD TRAUMA, INITIAL ENCOUNTER: ICD-10-CM

## 2021-02-04 DIAGNOSIS — R60.0 BILATERAL LEG EDEMA: ICD-10-CM
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
## 2021-02-04 DIAGNOSIS — Z79.01 CHRONIC ANTICOAGULATION: ICD-10-CM

## 2021-02-04 PROCEDURE — 93970 EXTREMITY STUDY: CPT

## 2021-02-04 PROCEDURE — 70450 CT HEAD/BRAIN W/O DYE: CPT

## 2021-02-12 ENCOUNTER — ANTICOAGULATION MONITORING (OUTPATIENT)
Dept: VASCULAR LAB | Facility: MEDICAL CENTER | Age: 81
End: 2021-02-12

## 2021-02-12 DIAGNOSIS — Z79.01 CHRONIC ANTICOAGULATION: ICD-10-CM

## 2021-02-12 LAB — INR PPP: 2.3 (ref 2–3.5)

## 2021-02-14 NOTE — PROGRESS NOTES
OP Telephone Anticoagulation Service Note    Date: 2021      Anticoagulation Summary  As of 2021    INR goal:  2.0-3.0   TTR:  74.6 % (5.6 y)   INR used for dosin.30 (2021)   Warfarin maintenance plan:  3.75 mg (2.5 mg x 1.5) every Fri; 2.5 mg (2.5 mg x 1) all other days   Weekly warfarin total:  18.75 mg   Plan last modified:  Massiel DIAZ Filter (10/23/2020)   Next INR check:  2021   Target end date:  Indefinite    Indications    Atrial fibrillation (HCC) (Resolved) [I48.91]  Chronic anticoagulation [Z79.01]             Anticoagulation Episode Summary     INR check location:  Home Draw    Preferred lab:      Send INR reminders to:      Comments:  Winston YEAGER      Anticoagulation Care Providers     Provider Role Specialty Phone number    Lizzy Haywood M.D. Referring Cardiology [phone]    [facility] Anticoagulation [facility] Responsible  [phone]    Vladimir Taylor, PharmD Responsible          Anticoagulation Patient Findings      INR therapeutic at 2.3.  Spoke w/ pt on phone.  Verified regimen w/ pt.  Instructed pt to continue on with current regimen.  NO s/s bleeding reported per pt.  NO changes in diet reported per pt.  NO changes in medications reported per pt.  Check INR in 2 week(s).  Instructed pt to call clinic at [phone] if there are any questions.  Pt stated understanding.    Pt is not on antiplatelet therapy.    Joe Kirby, PeterD

## 2021-02-16 RX ORDER — MAGNESIUM OXIDE 400 MG/1
400 TABLET ORAL DAILY
Qty: 40 TABLET | Refills: 1 | Status: SHIPPED | OUTPATIENT
Start: 2021-02-16 | End: 2021-05-10

## 2021-02-16 NOTE — TELEPHONE ENCOUNTER
Requested Prescriptions     Signed Prescriptions Disp Refills   • magnesium oxide (MAG-OX) 400 MG Tab tablet 40 tablet 1     Sig: Take 1 tablet by mouth every day.     Authorizing Provider: ROSE HILARIO A.P.R.N.

## 2021-02-16 NOTE — TELEPHONE ENCOUNTER
Received request via: Pharmacy    Was the patient seen in the last year in this department? Yes LOV 01/29/2021    Does the patient have an active prescription (recently filled or refills available) for medication(s) requested? No

## 2021-02-27 LAB — INR PPP: 2 (ref 2–3.5)

## 2021-03-01 ENCOUNTER — ANTICOAGULATION MONITORING (OUTPATIENT)
Dept: VASCULAR LAB | Facility: MEDICAL CENTER | Age: 81
End: 2021-03-01

## 2021-03-01 DIAGNOSIS — Z79.01 CHRONIC ANTICOAGULATION: ICD-10-CM

## 2021-03-02 NOTE — PROGRESS NOTES
Attempted to contact, phone went to busy tone eventually.     Taty Damon  3/1/21    OP   Telephone Anticoagulation Service Note      Anticoagulation Summary  As of 3/1/2021    INR goal:  2.0-3.0   TTR:  74.8 % (5.6 y)   INR used for dosin.00 (2021)   Warfarin maintenance plan:  3.75 mg (2.5 mg x 1.5) every Fri; 2.5 mg (2.5 mg x 1) all other days   Weekly warfarin total:  18.75 mg   Plan last modified:  Massiel M Filter (10/23/2020)   Next INR check:  3/12/2021   Target end date:  Indefinite    Indications    Atrial fibrillation (HCC) (Resolved) [I48.91]  Chronic anticoagulation [Z79.01]             Anticoagulation Episode Summary     INR check location:  Home Draw    Preferred lab:      Send INR reminders to:      Comments:  Winston YEAGER      Anticoagulation Care Providers     Provider Role Specialty Phone number    Lizzy Haywood M.D. Referring Cardiology [phone]    [facility] Anticoagulation Services Responsible  [phone]    Vladimir Taylor, PharmD Responsible          Anticoagulation Patient Findings  Patient Findings     Negatives:  Signs/symptoms of thrombosis, Signs/symptoms of bleeding, Laboratory test error suspected, Change in health, Change in alcohol use, Change in activity, Upcoming invasive procedure, Emergency department visit, Upcoming dental procedure, Missed doses, Extra doses, Change in medications, Change in diet/appetite, Hospital admission, Bruising, Other complaints         Spoke with the patient on the phone today, reporting a therapeutic INR of 2.0.  Confirmed the current warfarin dosing regimen and patient compliance.  Patient denies any interval changes to diet and/or medications. Patient denies any signs/symptoms of bleeding or clotting.  Patient instructed to continue with the current warfarin dosing regimen, and asked to follow up again in 2 weeks.     Rayray Damon  PharmD

## 2021-03-05 ENCOUNTER — HOSPITAL ENCOUNTER (OUTPATIENT)
Dept: CARDIOLOGY | Facility: MEDICAL CENTER | Age: 81
End: 2021-03-05
Attending: NURSE PRACTITIONER
Payer: MEDICARE

## 2021-03-05 ENCOUNTER — ANTICOAGULATION MONITORING (OUTPATIENT)
Dept: VASCULAR LAB | Facility: MEDICAL CENTER | Age: 81
End: 2021-03-05

## 2021-03-05 DIAGNOSIS — Z79.01 CHRONIC ANTICOAGULATION: ICD-10-CM

## 2021-03-05 DIAGNOSIS — R60.0 BILATERAL LEG EDEMA: ICD-10-CM

## 2021-03-05 LAB
INR PPP: 1.3 (ref 2–3.5)
LV EJECT FRACT  99904: 65
LV EJECT FRACT MOD 2C 99903: 76.99
LV EJECT FRACT MOD 4C 99902: 48.98
LV EJECT FRACT MOD BP 99901: 65.36

## 2021-03-05 PROCEDURE — 93306 TTE W/DOPPLER COMPLETE: CPT

## 2021-03-05 PROCEDURE — 93306 TTE W/DOPPLER COMPLETE: CPT | Mod: 26 | Performed by: INTERNAL MEDICINE

## 2021-03-05 NOTE — PROGRESS NOTES
Anticoagulation Summary  As of 3/5/2021    INR goal:  2.0-3.0   TTR:  74.6 % (5.6 y)   INR used for dosin.30 (3/5/2021)   Warfarin maintenance plan:  3.75 mg (2.5 mg x 1.5) every Fri; 2.5 mg (2.5 mg x 1) all other days   Weekly warfarin total:  18.75 mg   Plan last modified:  Massiel Coleman (10/23/2020)   Next INR check:  3/10/2021   Target end date:  Indefinite    Indications    Atrial fibrillation (HCC) (Resolved) [I48.91]  Chronic anticoagulation [Z79.01]             Anticoagulation Episode Summary     INR check location:  Home Draw    Preferred lab:      Send INR reminders to:      Comments:  Winston YEAGER      Anticoagulation Care Providers     Provider Role Specialty Phone number    Lizzy Haywood M.D. Referring Cardiology [phone]    [facility] Anticoagulation [facility] Responsible  [phone]    Vladimir Taylor, PharmD Responsible          Anticoagulation Patient Findings    Spoke with patients  on the phone.  Patient's INR is Subtherapeutic today at 1.30.      Changes in medications: Denies  Changes in diet: Reports eating less greens and sticking to soups due to her  being hospitlized but is getting back to eating veggies.  S/S of abnormal bleeding/brusing/clotting: Denies  Missed doses: Denies    Dosing: Will bolus 5 mg for today then 3.75 mg tomorrow, then return to normal dosing schedule with close follow up within 1 week.  expressed understanding.      Will follow-up with patient in 1 week    Discussed with PharmD prior to contacting patient.     Jeanette Tinajero, Pharmacy Intern

## 2021-03-10 ENCOUNTER — PATIENT MESSAGE (OUTPATIENT)
Dept: MEDICAL GROUP | Facility: PHYSICIAN GROUP | Age: 81
End: 2021-03-10

## 2021-03-10 DIAGNOSIS — L89.303 PRESSURE INJURY OF BUTTOCK, STAGE 3, UNSPECIFIED LATERALITY (HCC): ICD-10-CM

## 2021-03-10 NOTE — PATIENT COMMUNICATION
1. Caller Name: Donte Magaña                        Call Back Number: Lalitha [email or web address]      How would the patient prefer to be contacted with a response: [employer] message    2. SPECIFIC Action To Be Taken: Referral to Wound Clinic    3. Diagnosis/Clinical Reason for Request: Wound on Buttock     4. Specialty & Provider Name/Lab/Imaging Location: [facility]    5. Is appointment scheduled for requested order/referral: no    Patient was not informed they will receive a return phone call from the office ONLY if there are any questions before processing their request. Advised to call back if they haven't received a call from the referral department in 5 days.

## 2021-03-10 NOTE — TELEPHONE ENCOUNTER
From: Donte Magaña  To: Nurse Practicioner Rosi Mendoza  Sent: 3/10/2021 10:28 AM PST  Subject: Procedure Question    I still have the wound on my Butt. Please give me a ref to the wound clinic.  Thanks  Donte Magaña

## 2021-03-17 ENCOUNTER — NON-PROVIDER VISIT (OUTPATIENT)
Dept: WOUND CARE | Facility: MEDICAL CENTER | Age: 81
End: 2021-03-17
Attending: NURSE PRACTITIONER
Payer: MEDICARE

## 2021-03-17 PROCEDURE — 97597 DBRDMT OPN WND 1ST 20 CM/<: CPT

## 2021-03-17 PROCEDURE — 99211 OFF/OP EST MAY X REQ PHY/QHP: CPT | Mod: 25

## 2021-03-17 NOTE — CERTIFICATION
Non Provider Encounter- Full Thickness wound    HISTORY OF PRESENT ILLNESS    Wound History:  START OF CARE IN CLINIC: 3/17/2021  REFERRING PROVIDER: VICTORIANO Montes  WOUND- Full Thickness Wound  LOCATION: Left medial buttock, right medial buttock    HISTORY:  Patient is a 80 year old female with bilateral buttocks wounds. She states that she has had wounds on her buttocks off and on for years, but the wounds most recently re-opened sometime after January 2021. Her  has been applying viscous lidocaine and zinc cream to the wounds with no improvement. She sent a [employer] message to her primary care provider on 3/10/2021 requesting a referral to this wound clinic. She previously had home health services, and is interested in receiving home health services again.    Pertinent Labs and Diagnostics:    Labs:     A1c:   Lab Results   Component Value Date/Time    HBA1C 5.3 02/13/2019 07:57 PM          IMAGING: N/A    VASCULAR STUDIES: N/A    LAST  WOUND CULTURE:  DATE : N/A             FALL RISK ASSESSMENT:   X 65 years or older        Fall within the last 2 years   X Uses ambulatory devices     Loss of protective sensation in feet      Use of prostethic/orthotic       Presence of lower extremity/foot/toe amputation      Taking medication that increases risk (per facility policy)    Interventions Recommended (if any of the above are selected):   Use of Assistive Device: Walker   Supervision with ambulation: Caregiver   Assistance with ambulation: Caregiver    Patient allergies and medications reviewed via Epic 3/17/2021.     Wound Assessment:  Wound 03/17/21 Full Thickness Wound Buttocks Inner Right -Right Medial Buttock (Active)   Wound Image   03/17/21 1430   Site Assessment Red;Yellow;Purple 03/17/21 1430   Periwound Assessment Scar tissue 03/17/21 1430   Margins Attached edges 03/17/21 1430   Drainage Amount KESHA 03/17/21 1430   Drainage Description KESHA 03/17/21 1430   Treatments Cleansed;Topical  Lidocaine;CSWD - Conservative Sharp Wound Debridement 03/17/21 1430   Wound Cleansing Normal Saline Irrigation 03/17/21 1430   Periwound Protectant Skin Protectant Wipes to Periwound;Barrier Paste 03/17/21 1430   Dressing Cleansing/Solutions Not Applicable 03/17/21 1430   Dressing Options Honey Colloid;Silicone Adhesive Foam 03/17/21 1430   Dressing Changed New 03/17/21 1430   Dressing Change/Treatment Frequency Every 72 hrs, and As Needed 03/17/21 1430   Non-staged Wound Description Full thickness 03/17/21 1430   Wound Length (cm) 7.5 cm 03/17/21 1430   Wound Width (cm) 1.5 cm 03/17/21 1430   Wound Depth (cm) 0.2 cm 03/17/21 1430   Wound Surface Area (cm^2) 11.25 cm^2 03/17/21 1430   Wound Volume (cm^3) 2.25 cm^3 03/17/21 1430   Post-Procedure Length (cm) 7.5 cm 03/17/21 1430   Post-Procedure Width (cm) 1.5 cm 03/17/21 1430   Post-Procedure Depth (cm) 0.2 cm 03/17/21 1430   Post-Procedure Surface Area (cm^2) 11.25 cm^2 03/17/21 1430   Post-Procedure Volume (cm^3) 2.25 cm^3 03/17/21 1430   Tunneling (cm) 0 cm 03/17/21 1430   Undermining (cm) 0 cm 03/17/21 1430   Wound Odor None 03/17/21 1430   Exposed Structures None 03/17/21 1430       Wound 03/17/21 Full Thickness Wound Buttocks Inner Left --Left Medial Buttock (Active)   Wound Image   03/17/21 1430   Site Assessment Yellow;[place] 03/17/21 1430   Periwound Assessment Scar tissue 03/17/21 1430   Margins Attached edges 03/17/21 1430   Drainage Amount KEHSA 03/17/21 1430   Drainage Description KESHA 03/17/21 1430   Treatments Cleansed;Topical Lidocaine;Provider debridement 03/17/21 1430   Wound Cleansing Normal Saline Irrigation 03/17/21 1430   Periwound Protectant Skin Protectant Wipes to Periwound;Barrier Paste 03/17/21 1430   Dressing Cleansing/Solutions Not Applicable 03/17/21 1430   Dressing Options Honey Colloid;Silicone Adhesive Foam 03/17/21 1430   Dressing Changed New 03/17/21 1430   Dressing Change/Treatment Frequency Every 72 hrs, and As Needed 03/17/21 1430    Non-staged Wound Description Full thickness 03/17/21 1430   Wound Length (cm) 6 cm 03/17/21 1430   Wound Width (cm) 2.8 cm 03/17/21 1430   Wound Depth (cm) 0.1 cm 03/17/21 1430   Wound Surface Area (cm^2) 16.8 cm^2 03/17/21 1430   Wound Volume (cm^3) 1.68 cm^3 03/17/21 1430   Post-Procedure Length (cm) 6 cm 03/17/21 1430   Post-Procedure Width (cm) 2.8 cm 03/17/21 1430   Post-Procedure Depth (cm) 0.1 cm 03/17/21 1430   Post-Procedure Surface Area (cm^2) 16.8 cm^2 03/17/21 1430   Post-Procedure Volume (cm^3) 1.68 cm^3 03/17/21 1430     Procedures:    -Viscous lidocaine applied topically to wound beds for approximately 5 minutes prior to debridement  -Selective debridement with curette to remove ~5 cm2 slough from wounds.  -Refer to flowsheet for wound care details.     PATIENT EDUCATION  -Discussed rationale for using Medihoney colloid on wounds (as an antimicrobial, to provide a moist wound environment, and facilitate autolytic debridement).  -Demonstrated dressing application process for patient's .  -Advised to call her healthcare provider or go to ER for increased redness, swelling, drainage or odor, or if patient develops fever, chills, nausea or vomiting.

## 2021-03-17 NOTE — PATIENT INSTRUCTIONS
-Keep dressings clean and dry. Change dressings if they become over saturated, soiled or fall off.     -Should you experience any significant changes in your wound(s), such as signs of infection (redness, swelling, localized heat, increased pain, fever > 101 F, chills) or have any questions regarding your home care instructions, please contact the wound center at [phone]. If after hours, contact your primary care physician or go to the hospital emergency room.

## 2021-03-19 NOTE — PROGRESS NOTES
Wound supply order faxed to [facility] at fax #[phone].  Wound 03/17/21 Full Thickness Wound Buttocks Inner Right -Right Medial Buttock (Active)   Wound Image   03/17/21 1430   Site Assessment Red;Yellow;Purple 03/17/21 1430   Periwound Assessment Scar tissue 03/17/21 1430   Margins Attached edges 03/17/21 1430   Drainage Amount Moderate 03/17/21 1430   Drainage Description Serosanguineous 03/17/21 1430   Treatments Cleansed;Topical Lidocaine;CSWD - Conservative Sharp Wound Debridement 03/17/21 1430   Wound Cleansing Normal Saline Irrigation 03/17/21 1430   Periwound Protectant Skin Protectant Wipes to Periwound;Barrier Paste 03/17/21 1430   Dressing Cleansing/Solutions Not Applicable 03/17/21 1430   Dressing Options Honey Colloid;Silicone Adhesive Foam 03/17/21 1430   Dressing Changed New 03/17/21 1430   Dressing Change/Treatment Frequency Every 72 hrs, and As Needed 03/17/21 1430   Non-staged Wound Description Full thickness 03/17/21 1430   Wound Length (cm) 7.5 cm 03/17/21 1430   Wound Width (cm) 1.5 cm 03/17/21 1430   Wound Depth (cm) 0.2 cm 03/17/21 1430   Wound Surface Area (cm^2) 11.25 cm^2 03/17/21 1430   Wound Volume (cm^3) 2.25 cm^3 03/17/21 1430   Post-Procedure Length (cm) 7.5 cm 03/17/21 1430   Post-Procedure Width (cm) 1.5 cm 03/17/21 1430   Post-Procedure Depth (cm) 0.2 cm 03/17/21 1430   Post-Procedure Surface Area (cm^2) 11.25 cm^2 03/17/21 1430   Post-Procedure Volume (cm^3) 2.25 cm^3 03/17/21 1430   Tunneling (cm) 0 cm 03/17/21 1430   Undermining (cm) 0 cm 03/17/21 1430   Wound Odor None 03/17/21 1430   Exposed Structures None 03/17/21 1430       Wound 03/17/21 Full Thickness Wound Buttocks Inner Left --Left Medial Buttock (Active)   Wound Image   03/17/21 1430   Site Assessment Yellow;[place] 03/17/21 1430   Periwound Assessment Scar tissue 03/17/21 1430   Margins Attached edges 03/17/21 1430   Drainage Amount Moderate 03/17/21 1430   Drainage Description Serosanguineous 03/17/21 1430   Treatments  Cleansed;Topical Lidocaine;Provider debridement 03/17/21 1430   Wound Cleansing Normal Saline Irrigation 03/17/21 1430   Periwound Protectant Skin Protectant Wipes to Periwound;Barrier Paste 03/17/21 1430   Dressing Cleansing/Solutions Not Applicable 03/17/21 1430   Dressing Options Honey Colloid;Silicone Adhesive Foam 03/17/21 1430   Dressing Changed New 03/17/21 1430   Dressing Change/Treatment Frequency Every 72 hrs, and As Needed 03/17/21 1430   Non-staged Wound Description Full thickness 03/17/21 1430   Wound Length (cm) 6 cm 03/17/21 1430   Wound Width (cm) 2.8 cm 03/17/21 1430   Wound Depth (cm) 0.1 cm 03/17/21 1430   Wound Surface Area (cm^2) 16.8 cm^2 03/17/21 1430   Wound Volume (cm^3) 1.68 cm^3 03/17/21 1430   Post-Procedure Length (cm) 6 cm 03/17/21 1430   Post-Procedure Width (cm) 2.8 cm 03/17/21 1430   Post-Procedure Depth (cm) 0.1 cm 03/17/21 1430   Post-Procedure Surface Area (cm^2) 16.8 cm^2 03/17/21 1430   Post-Procedure Volume (cm^3) 1.68 cm^3 03/17/21 1430   Tunneling (cm) 0 cm 03/17/21 1430   Undermining (cm) 0 cm 03/17/21 1430   Wound Odor None 03/17/21 1430   Exposed Structures None 03/17/21 1430

## 2021-03-23 ENCOUNTER — ANTICOAGULATION MONITORING (OUTPATIENT)
Dept: CARDIOLOGY | Facility: MEDICAL CENTER | Age: 81
End: 2021-03-23

## 2021-03-23 ENCOUNTER — ANTICOAGULATION MONITORING (OUTPATIENT)
Dept: VASCULAR LAB | Facility: MEDICAL CENTER | Age: 81
End: 2021-03-23

## 2021-03-23 DIAGNOSIS — Z79.01 CHRONIC ANTICOAGULATION: ICD-10-CM

## 2021-03-23 LAB — INR PPP: 1.6 (ref 2–3.5)

## 2021-03-23 NOTE — PROGRESS NOTES
OP   Telephone Anticoagulation Service Note      Anticoagulation Summary  As of 3/23/2021    INR goal:  2.0-3.0   TTR:  73.9 % (5.7 y)   INR used for dosin.60 (3/23/2021)   Warfarin maintenance plan:  3.75 mg (2.5 mg x 1.5) every Fri; 2.5 mg (2.5 mg x 1) all other days   Weekly warfarin total:  18.75 mg   Plan last modified:  Massiel Coleman (10/23/2020)   Next INR check:  3/30/2021   Target end date:  Indefinite    Indications    Atrial fibrillation (HCC) (Resolved) [I48.91]  Chronic anticoagulation [Z79.01]             Anticoagulation Episode Summary     INR check location:  Home Draw    Preferred lab:      Send INR reminders to:      Comments:  Winston YEAGER      Anticoagulation Care Providers     Provider Role Specialty Phone number    Lizzy Haywood M.D. Referring Cardiology [phone]    [facility]  [phone]    Vladimir Taylor, PharmD Responsible          Anticoagulation Patient Findings    Spoke with the patient on the phone today, reporting a SUB-therapeutic INR of 1.6.  Confirmed the current warfarin dosing regimen and patient compliance.  Patient reports that ever since she banged her head after a GLF her INR has been labile, but she reports getting fully checked over and cleared with no clots or bleeds.   Patient also reports she was eating more greens over the past week.  Patient denies any interval changes to medications. Patient denies any signs/symptoms of bleeding or clotting.  Patient will bolus with 5mg TONIGHT, as a one time boost dose, then will resume her current regimen.  Patient will retest again in 1 week.     Rayray GreenD

## 2021-03-25 ENCOUNTER — OFFICE VISIT (OUTPATIENT)
Dept: WOUND CARE | Facility: MEDICAL CENTER | Age: 81
End: 2021-03-25
Attending: NURSE PRACTITIONER
Payer: MEDICARE

## 2021-03-25 VITALS
OXYGEN SATURATION: 93 % | DIASTOLIC BLOOD PRESSURE: 79 MMHG | SYSTOLIC BLOOD PRESSURE: 140 MMHG | TEMPERATURE: 97.4 F | RESPIRATION RATE: 18 BRPM | HEART RATE: 77 BPM

## 2021-03-25 DIAGNOSIS — E66.09 OBESITY DUE TO EXCESS CALORIES, UNSPECIFIED CLASSIFICATION, UNSPECIFIED WHETHER SERIOUS COMORBIDITY PRESENT: ICD-10-CM

## 2021-03-25 DIAGNOSIS — L89.303 PRESSURE INJURY OF BUTTOCK, STAGE 3, UNSPECIFIED LATERALITY (HCC): Primary | ICD-10-CM

## 2021-03-25 DIAGNOSIS — R54 AGE-RELATED PHYSICAL DEBILITY: ICD-10-CM

## 2021-03-25 PROCEDURE — 11042 DBRDMT SUBQ TIS 1ST 20SQCM/<: CPT

## 2021-03-25 PROCEDURE — 99213 OFFICE O/P EST LOW 20 MIN: CPT | Mod: 25

## 2021-03-25 PROCEDURE — 99212 OFFICE O/P EST SF 10 MIN: CPT | Mod: 25 | Performed by: NURSE PRACTITIONER

## 2021-03-25 PROCEDURE — 11042 DBRDMT SUBQ TIS 1ST 20SQCM/<: CPT | Performed by: NURSE PRACTITIONER

## 2021-03-25 ASSESSMENT — ENCOUNTER SYMPTOMS
DEPRESSION: 0
VOMITING: 0
DIARRHEA: 0
COUGH: 0
CONSTIPATION: 0
NAUSEA: 0
FEVER: 0
CLAUDICATION: 0
CHILLS: 0
NERVOUS/ANXIOUS: 0
BACK PAIN: 1
SHORTNESS OF BREATH: 0

## 2021-03-25 NOTE — PROGRESS NOTES
Provider Encounter- Pressure Injury    HISTORY OF PRESENT ILLNESS                 START OF CARE IN CLINIC: 3/17/2021  REFERRING PROVIDER: VICTORIANO Montes  WOUND- Full Thickness Wound  LOCATION: Left medial buttock, right medial buttock              WOUND HISTORY: Patient is an 78 year old female who has had wounds to her buttocks off and on for years..  She was treated for these wounds previously in the clinic, discharged most recently at the end of December 2020 due to healing.  Her wounds have now recurred, she was referred back to [facility].  She is unsure of how the wounds started, but does state that she has a bad back and is in her recliner chair day and night.  She has a Roho cushion for offloading for her chair.  She does get up several times per day to go to the bathroom, but otherwise does not ambulate much.  She also has problems with frequent urinary incontinence, wears absorptive pads.  Her  brings her food, does all the cooking, cleaning and shopping.                       PERTINENT PMH: Obesity, limited mobility, chronic back pain, CAD, bilateral knee replacements, right hip replacement    Contributing factors: Immobility -yes.  Activity level: -Sedentary.  Support surfaces: -Waffle cushion to chair.  Incontinence: No.  Nutritional state: Well-nourished. Caregiver assistance: . Obesity: Yes. Moisture: Yes      DIABETES: No    TOBACCO USE: She has never smoked or used tobacco products      Interval History:  3/25/2021: Initial provider visit with PHYLLIS Holguin, VIVIAN-BC, CWJERRYN, CFKIMBER.  Donte is well-known to me from previous treatment in clinic.  She is accompanied today by her .  Wounds recurred about a month ago.  She is still spending most of her time in her recliner chair, with Roho cushion, ambulates only to the bathroom, incontinent of urine, wears adult briefs.  While her skin was still intact, she was applying A&E ointment.  Hydrocolloid applied in clinic last  visit.  She has difficulty changing her dressing,  able to help but he has multiple health issues of his own.  She is requesting referral to home health, prefers [facility] as this is the agency that sees her .       REVIEW OF SYSTEMS:   Review of Systems   Constitutional: Negative for chills and fever.   Respiratory: Negative for cough and shortness of breath.         Shortness of breath with exertion   Cardiovascular: Positive for leg swelling. Negative for chest pain and claudication.   Gastrointestinal: Negative for constipation, diarrhea, nausea and vomiting.   Genitourinary:        Frequent incontinence of urine  Occasional incontinence of stool  Wears absorptive pad   Musculoskeletal: Positive for back pain and joint pain.        Chronic back and hip pain, debilitating   Skin:        Recurring buttock wounds for several years   Psychiatric/Behavioral: Negative for depression. The patient is not nervous/anxious.        PHYSICAL EXAMINATION:   /79   Pulse 77   Temp 36.3 °C (97.4 °F) (Temporal)   Resp 18   LMP 01/01/1993   SpO2 93%   Physical Exam   Constitutional: She is oriented to person, place, and time.   Obese elderly female   HENT:   Head: Normocephalic.   Eyes: Pupils are equal, round, and reactive to light.   Pulmonary/Chest: Effort normal.   Musculoskeletal:         General: Edema present.      Comments: Generalized dependent bilateral lower extremity edema  Uses walker for ambulation   Neurological: She is alert and oriented to person, place, and time.   Skin: Skin is warm.   Ulcer to left buttocks resolved.  Ulcer to right buttocks resolved  Rash to bilateral medial buttocks  Refer to photos and flowsheet   Psychiatric: Mood, memory, affect and judgment normal.     Wound Assessment       Wound 03/17/21 Full Thickness Wound Buttocks Inner Right -Right Medial Buttock (Active)   Wound Image   03/17/21 1430   Site Assessment Red;Yellow;Purple 03/17/21 1430   Periwound Assessment Scar  tissue 03/17/21 1430   Margins Attached edges 03/17/21 1430   Drainage Amount Moderate 03/17/21 1430   Drainage Description Serosanguineous 03/17/21 1430   Treatments Cleansed;Topical Lidocaine;CSWD - Conservative Sharp Wound Debridement 03/17/21 1430   Wound Cleansing Normal Saline Irrigation 03/17/21 1430   Periwound Protectant Skin Protectant Wipes to Periwound;Barrier Paste 03/17/21 1430   Dressing Cleansing/Solutions Not Applicable 03/17/21 1430   Dressing Options Honey Colloid;Silicone Adhesive Foam 03/17/21 1430   Dressing Changed New 03/17/21 1430   Dressing Change/Treatment Frequency Every 72 hrs, and As Needed 03/17/21 1430   Non-staged Wound Description Full thickness 03/17/21 1430   Wound Length (cm) 7.5 cm 03/17/21 1430   Wound Width (cm) 1.5 cm 03/17/21 1430   Wound Depth (cm) 0.2 cm 03/17/21 1430   Wound Surface Area (cm^2) 11.25 cm^2 03/17/21 1430   Wound Volume (cm^3) 2.25 cm^3 03/17/21 1430   Post-Procedure Length (cm) 7.5 cm 03/17/21 1430   Post-Procedure Width (cm) 1.5 cm 03/17/21 1430   Post-Procedure Depth (cm) 0.2 cm 03/17/21 1430   Post-Procedure Surface Area (cm^2) 11.25 cm^2 03/17/21 1430   Post-Procedure Volume (cm^3) 2.25 cm^3 03/17/21 1430   Tunneling (cm) 0 cm 03/17/21 1430   Undermining (cm) 0 cm 03/17/21 1430   Wound Odor None 03/17/21 1430   Exposed Structures None 03/17/21 1430       Wound 03/17/21 Full Thickness Wound Buttocks Inner Left --Left Medial Buttock (Active)   Wound Image   03/17/21 1430   Site Assessment Yellow;[place] 03/17/21 1430   Periwound Assessment Scar tissue 03/17/21 1430   Margins Attached edges 03/17/21 1430   Drainage Amount Moderate 03/17/21 1430   Drainage Description Serosanguineous 03/17/21 1430   Treatments Cleansed;Topical Lidocaine;Provider debridement 03/17/21 1430   Wound Cleansing Normal Saline Irrigation 03/17/21 1430   Periwound Protectant Skin Protectant Wipes to Periwound;Barrier Paste 03/17/21 1430   Dressing Cleansing/Solutions Not Applicable  03/17/21 1430   Dressing Options Honey Colloid;Silicone Adhesive Foam 03/17/21 1430   Dressing Changed New 03/17/21 1430   Dressing Change/Treatment Frequency Every 72 hrs, and As Needed 03/17/21 1430   Non-staged Wound Description Full thickness 03/17/21 1430   Wound Length (cm) 6 cm 03/17/21 1430   Wound Width (cm) 2.8 cm 03/17/21 1430   Wound Depth (cm) 0.1 cm 03/17/21 1430   Wound Surface Area (cm^2) 16.8 cm^2 03/17/21 1430   Wound Volume (cm^3) 1.68 cm^3 03/17/21 1430   Post-Procedure Length (cm) 6 cm 03/17/21 1430   Post-Procedure Width (cm) 2.8 cm 03/17/21 1430   Post-Procedure Depth (cm) 0.1 cm 03/17/21 1430   Post-Procedure Surface Area (cm^2) 16.8 cm^2 03/17/21 1430   Post-Procedure Volume (cm^3) 1.68 cm^3 03/17/21 1430   Tunneling (cm) 0 cm 03/17/21 1430   Undermining (cm) 0 cm 03/17/21 1430   Wound Odor None 03/17/21 1430   Exposed Structures None 03/17/21 1430        PROCEDURE:   -2% viscous lidocaine applied topically to wound beds for approximately 5 minutes prior to debridement  -Curette used to debride wound beds.  Excisional debridement was performed to remove devitalized tissue until healthy, bleeding tissue was visualized.  Total area debrided approximate 20 cm².  Tissue debrided into the subcutaneous layer.    -Bleeding controlled with manual pressure.    -Wound care completed by wound RN, refer to flowsheet  -Patient tolerated the procedure well, without c/o pain or discomfort.           PATIENT EDUCATION  -Etiology of pressure injury  -Importance of offloading  -Strategies for offloading in bed and when seated discussed and demonstrated  - Importance of adequate nutrition for wound healing  -Increase protein intake (unless contraindicated by renal status)   - Advised to go to ER for any increased redness, swelling, drainage or odor, or if patient develops fever, chills, nausea or vomiting.    ASSESSMENT AND PLAN:     1. Pressure injury of buttock, stage 3, unspecified laterality  ([facility])  Comments: Shallow stage III ulcers to bilateral medial buttocks, mirroring each other, kissing wounds.      3/25/2021: Recurrent ulcers to bilateral buttocks, complicated by obesity, moisture, limited mobility, and advanced age.  Total wound area has decreased in size assessment.  -Excisional debridement of wound in clinic today, medically necessary to promote wound healing.  -Patient to return to clinic weekly for assessment and debridement  -Patient to change dressing 1-2 times per week in between clinic visits    Wound care: Honey colloid to promote autolytic debridement and to manage bioburden, foam cover dressing      2. Age-related physical debility  Comments: Patient does not ambulate much, spends most of her day sitting due to physical weakness and chronic back pain.     3/25/2021: Patient is elderly with multiple medical issues.  She and her  are having difficulty changing her dressing, requests home health.  Her  already has [facility], and they would like to stay with the same agency.  -Referral placed    3. Obesity due to excess calories, unspecified classification, unspecified whether serious comorbidity present  Comments: Contributing factor, patient is severely obese.  Weight loss likely very difficult patient given back pain issues and advanced age.      3/25/2021: Encourage patient to ambulate several times during the day.    Patient was seen for 20 minutes face to face of which > 50% of appointment time was spent on counseling and coordination of care regarding the above.      Please note that this dictation was created using voice recognition software. I have worked with technical experts from [facility] to optimize the interface.  I have made every reasonable attempt to correct obvious errors, but there may be errors of grammar and possibly content that I did not discover before finalizing the note.

## 2021-03-30 ENCOUNTER — ANTICOAGULATION MONITORING (OUTPATIENT)
Dept: VASCULAR LAB | Facility: MEDICAL CENTER | Age: 81
End: 2021-03-30

## 2021-03-30 DIAGNOSIS — Z79.01 CHRONIC ANTICOAGULATION: ICD-10-CM

## 2021-03-30 LAB — INR PPP: 1.7 (ref 2–3.5)

## 2021-03-30 NOTE — PROGRESS NOTES
Anticoagulation Summary  As of 3/30/2021    INR goal:  2.0-3.0   TTR:  73.7 % (5.7 y)   INR used for dosin.70 (3/30/2021)   Warfarin maintenance plan:  3.75 mg (2.5 mg x 1.5) every Mon, Fri; 2.5 mg (2.5 mg x 1) all other days   Weekly warfarin total:  20 mg   Plan last modified:  Vladimir Taylor PharmD (3/30/2021)   Next INR check:  2021   Target end date:  Indefinite    Indications    Atrial fibrillation (HCC) (Resolved) [I48.91]  Chronic anticoagulation [Z79.01]             Anticoagulation Episode Summary     INR check location:  Home Draw    Preferred lab:      Send INR reminders to:      Comments:  Winston YEAGER      Anticoagulation Care Providers     Provider Role Specialty Phone number    Lizzy Haywood M.D. Referring Cardiology [phone]    [facility] Responsible  [phone]    Vladimir Taylor, PharmD Responsible          Anticoagulation Patient Findings  Patient Findings     Negatives:  Signs/symptoms of thrombosis, Signs/symptoms of bleeding, Laboratory test error suspected, Change in health, Change in alcohol use, Change in activity, Upcoming invasive procedure, Emergency department visit, Upcoming dental procedure, Missed doses, Extra doses, Change in medications, Change in diet/appetite, Hospital admission, Bruising, Other complaints        Spoke with patient today regarding subtherapeutic INR of 1.7.  Patient denies any signs/symptoms of bruising or bleeding or any changes in diet and medications.  Instructed patient to call clinic with any questions or concerns.  Instructed patient to bolus with 5mg X 1, then increase weekly warfarin regimen as detailed above.  Follow up in 1 weeks, to reduce risk of adverse events related to this high risk medication,  Warfarin.    Vladimir Taylor, Tim, BCACP      
Detail Level: Zone

## 2021-03-31 ENCOUNTER — OFFICE VISIT (OUTPATIENT)
Dept: WOUND CARE | Facility: MEDICAL CENTER | Age: 81
End: 2021-03-31
Attending: NURSE PRACTITIONER
Payer: MEDICARE

## 2021-03-31 VITALS
OXYGEN SATURATION: 94 % | DIASTOLIC BLOOD PRESSURE: 68 MMHG | HEART RATE: 92 BPM | TEMPERATURE: 97.9 F | SYSTOLIC BLOOD PRESSURE: 122 MMHG | RESPIRATION RATE: 20 BRPM

## 2021-03-31 DIAGNOSIS — R32 INCONTINENCE ASSOCIATED DERMATITIS: ICD-10-CM

## 2021-03-31 DIAGNOSIS — L25.8 INCONTINENCE ASSOCIATED DERMATITIS: ICD-10-CM

## 2021-03-31 DIAGNOSIS — R54 AGE-RELATED PHYSICAL DEBILITY: ICD-10-CM

## 2021-03-31 DIAGNOSIS — L89.303 PRESSURE INJURY OF BUTTOCK, STAGE 3, UNSPECIFIED LATERALITY (HCC): ICD-10-CM

## 2021-03-31 DIAGNOSIS — E66.09 OBESITY DUE TO EXCESS CALORIES, UNSPECIFIED CLASSIFICATION, UNSPECIFIED WHETHER SERIOUS COMORBIDITY PRESENT: ICD-10-CM

## 2021-03-31 PROCEDURE — 99212 OFFICE O/P EST SF 10 MIN: CPT | Mod: 25

## 2021-03-31 PROCEDURE — 11042 DBRDMT SUBQ TIS 1ST 20SQCM/<: CPT | Performed by: NURSE PRACTITIONER

## 2021-03-31 PROCEDURE — 99212 OFFICE O/P EST SF 10 MIN: CPT | Mod: 25 | Performed by: NURSE PRACTITIONER

## 2021-03-31 PROCEDURE — 11042 DBRDMT SUBQ TIS 1ST 20SQCM/<: CPT

## 2021-03-31 RX ORDER — HYDROPHILIC CREAM
1 PASTE (GRAM) TOPICAL 2 TIMES DAILY
Qty: 30 G | Refills: 3 | Status: SHIPPED | OUTPATIENT
Start: 2021-03-31 | End: 2021-07-06 | Stop reason: SDUPTHER

## 2021-03-31 ASSESSMENT — ENCOUNTER SYMPTOMS
BACK PAIN: 1
DIARRHEA: 0
NERVOUS/ANXIOUS: 0
DEPRESSION: 0
FEVER: 0
VOMITING: 0
CONSTIPATION: 0
CHILLS: 0
SHORTNESS OF BREATH: 0
COUGH: 0
CLAUDICATION: 0
NAUSEA: 0

## 2021-03-31 ASSESSMENT — PAIN SCALES - GENERAL: PAINLEVEL: 6=MODERATE PAIN

## 2021-03-31 NOTE — PATIENT INSTRUCTIONS
-Keep your wound clean, dry, and intact. Apply triad cream multiple times during the day.    -Should you experience any significant changes in your wound(s), such as infection (redness, swelling, localized heat, increased pain, fever > 101 F, chills) or have any questions regarding your home care instructions, please contact the wound center at [phone]. If after hours, contact your primary care physician or go to the hospital emergency room.

## 2021-03-31 NOTE — PROGRESS NOTES
Provider Encounter- Pressure Injury    HISTORY OF PRESENT ILLNESS                 START OF CARE IN CLINIC: 3/17/2021  REFERRING PROVIDER: VICTORIANO Montes  WOUND- Full Thickness Wound  LOCATION: Left medial buttock, right medial buttock              WOUND HISTORY: Patient is an 78 year old female who has had wounds to her buttocks off and on for years..  She was treated for these wounds previously in the clinic, discharged most recently at the end of December 2020 due to healing.  Her wounds have now recurred, she was referred back to [facility].  She is unsure of how the wounds started, but does state that she has a bad back and is in her recliner chair day and night.  She has a Roho cushion for offloading for her chair.  She does get up several times per day to go to the bathroom, but otherwise does not ambulate much.  She also has problems with frequent urinary incontinence, wears absorptive pads.  Her  brings her food, does all the cooking, cleaning and shopping.                       PERTINENT PMH: Obesity, limited mobility, chronic back pain, CAD, bilateral knee replacements, right hip replacement    Contributing factors: Immobility -yes.  Activity level: -Sedentary.  Support surfaces: -Waffle cushion to chair.  Incontinence: No.  Nutritional state: Well-nourished. Caregiver assistance: . Obesity: Yes. Moisture: Yes      DIABETES: No    TOBACCO USE: She has never smoked or used tobacco products      Interval History:  3/25/2021: Initial provider visit with PHYLLIS Holguin, VIVIAN-BC, CWJERRYN, CFKIMBER.  Donte is well-known to me from previous treatment in clinic.  She is accompanied today by her .  Wounds recurred about a month ago.  She is still spending most of her time in her recliner chair, with Roho cushion, ambulates only to the bathroom, incontinent of urine, wears adult briefs.  While her skin was still intact, she was applying A&E ointment.  Hydrocolloid applied in clinic last  visit.  She has difficulty changing her dressing,  able to help but he has multiple health issues of his own.  She is requesting referral to home health, prefers [facility] as this is the agency that sees her .    3/31/2021 : Clinic visit with Kelly Ansari, PHYLLIS, FNP-BC, CWOCN, CFCN.  Donte balderrama doing well overall, denies fevers, chills, nausea, vomiting, cough or shortness of breath.  Buttock wounds continue to be a problem, complicated by incontinence and immobility.  Dressing applied to wounds last week in clinic fell off within the first 24 hours.       REVIEW OF SYSTEMS:   Review of Systems   Constitutional: Negative for chills and fever.   Respiratory: Negative for cough and shortness of breath.         Shortness of breath with exertion   Cardiovascular: Positive for leg swelling. Negative for chest pain and claudication.   Gastrointestinal: Negative for constipation, diarrhea, nausea and vomiting.   Genitourinary:        Frequent incontinence of urine  Occasional incontinence of stool  Wears absorptive pad   Musculoskeletal: Positive for back pain and joint pain.        Chronic back and hip pain, debilitating   Skin:        Recurring buttock wounds for several years   Psychiatric/Behavioral: Negative for depression. The patient is not nervous/anxious.        PHYSICAL EXAMINATION:   /68   Pulse 92   Temp 36.6 °C (97.9 °F)   Resp 20   LMP 01/01/1993   SpO2 94%   Physical Exam   Constitutional: She is oriented to person, place, and time.   Obese elderly female   HENT:   Head: Normocephalic.   Eyes: Pupils are equal, round, and reactive to light.   Pulmonary/Chest: Effort normal.   Musculoskeletal:         General: Edema present.      Comments: Generalized dependent bilateral lower extremity edema  Uses walker for ambulation   Neurological: She is alert and oriented to person, place, and time.   Skin: Skin is warm.   Ulcer to left buttocks resolved.  Ulcer to right buttocks resolved  Rash to  bilateral medial buttocks  Refer to photos and flowsheet   Psychiatric: Mood, memory, affect and judgment normal.     Wound Assessment             Wound 03/17/21 Full Thickness Wound Buttocks Inner Right -Right Medial Buttock (Active)   Wound Image    03/31/21 1400   Site Assessment Pink;Yellow 03/31/21 1400   Periwound Assessment Dry;Scar tissue 03/31/21 1400   Margins Attached edges 03/31/21 1400   Drainage Amount KESHA 03/31/21 1400   Drainage Description KESHA 03/31/21 1400   Treatments Cleansed;Topical Lidocaine;Provider debridement;Site care 03/31/21 1400   Wound Cleansing Puracyn [place] 03/31/21 1400   Periwound Protectant Barrier Paste 03/31/21 1400   Dressing Cleansing/Solutions Not Applicable 03/31/21 1400   Dressing Options Other (Comments) 03/31/21 1400   Dressing Changed Changed 03/31/21 1400   Dressing Status Open to Air 03/31/21 1400   Dressing Change/Treatment Frequency Every 72 hrs, and As Needed 03/25/21 1305   Non-staged Wound Description Full thickness 03/31/21 1400   Wound Length (cm) 1.2 cm 03/31/21 1400   Wound Width (cm) 0.6 cm 03/31/21 1400   Wound Depth (cm) 0.2 cm 03/31/21 1400   Wound Surface Area (cm^2) 0.72 cm^2 03/31/21 1400   Wound Volume (cm^3) 0.14 cm^3 03/31/21 1400   Post-Procedure Length (cm) 1.7 cm 03/31/21 1400   Post-Procedure Width (cm) 0.8 cm 03/31/21 1400   Post-Procedure Depth (cm) 0.2 cm 03/31/21 1400   Post-Procedure Surface Area (cm^2) 1.36 cm^2 03/31/21 1400   Post-Procedure Volume (cm^3) 0.27 cm^3 03/31/21 1400   Wound Healing % 94 03/31/21 1400   Wound Bed Granulation (%) 30 % 03/25/21 1305   Wound Bed Epithelium (%) 0 % 03/25/21 1305   Wound Bed Slough (%) 70 % 03/25/21 1305   Wound Bed Eschar (%) 0 % 03/25/21 1305   Tunneling (cm) 0 cm 03/31/21 1400   Undermining (cm) 0 cm 03/31/21 1400   Wound Odor None 03/31/21 1400   Exposed Structures None 03/31/21 1400       Wound 03/17/21 Full Thickness Wound Buttocks Inner Left --Left Medial Buttock Cluster (Active)   Wound Image     03/31/21 1400   Site Assessment Pink;Yellow 03/31/21 1400   Periwound Assessment Dry;Scar tissue 03/31/21 1400   Margins Attached edges 03/31/21 1400   Drainage Amount KESHA 03/31/21 1400   Drainage Description KESHA 03/31/21 1400   Treatments Cleansed;Topical Lidocaine;Provider debridement;Site care 03/31/21 1400   Wound Cleansing Puracyn [place] 03/31/21 1400   Periwound Protectant Barrier Paste 03/31/21 1400   Dressing Cleansing/Solutions Not Applicable 03/31/21 1400   Dressing Options Other (Comments) 03/31/21 1400   Dressing Changed Changed 03/31/21 1400   Dressing Status Open to Air 03/31/21 1400   Dressing Change/Treatment Frequency Every 72 hrs, and As Needed 03/25/21 1305   Non-staged Wound Description Full thickness 03/31/21 1400   Wound Length (cm) 0.4 cm 03/31/21 1400   Wound Width (cm) 0.6 cm 03/31/21 1400   Wound Depth (cm) 0.1 cm 03/31/21 1400   Wound Surface Area (cm^2) 0.24 cm^2 03/31/21 1400   Wound Volume (cm^3) 0.02 cm^3 03/31/21 1400   Post-Procedure Length (cm) 0.5 cm 03/31/21 1400   Post-Procedure Width (cm) 0.6 cm 03/31/21 1400   Post-Procedure Depth (cm) 0.1 cm 03/31/21 1400   Post-Procedure Surface Area (cm^2) 0.3 cm^2 03/31/21 1400   Post-Procedure Volume (cm^3) 0.03 cm^3 03/31/21 1400   Wound Healing % 99 03/31/21 1400   Wound Bed Granulation (%) 40 % 03/25/21 1305   Wound Bed Epithelium (%) 0 % 03/25/21 1305   Wound Bed Slough (%) 60 % 03/25/21 1305   Wound Bed Eschar (%) 0 % 03/25/21 1305   Tunneling (cm) 0 cm 03/31/21 1400   Undermining (cm) 0 cm 03/31/21 1400   Wound Odor None 03/31/21 1400   Exposed Structures None 03/31/21 1400       Wound 03/25/21 Full Thickness Wound Buttocks Left -Left Medial Buttock Largest Wound (Lateral) (Active)   Wound Image    03/31/21 1400   Site Assessment Pink;Red;Yellow;Painful 03/31/21 1400   Periwound Assessment Dry;Scar tissue 03/31/21 1400   Margins Attached edges 03/31/21 1400   Drainage Amount KESHA 03/31/21 1400   Drainage Description KESHA 03/31/21  1400   Treatments Cleansed;Topical Lidocaine;Provider debridement;Site care 03/31/21 1400   Wound Cleansing Puracyn [place] 03/31/21 1400   Periwound Protectant Barrier Paste 03/31/21 1400   Dressing Cleansing/Solutions Not Applicable 03/31/21 1400   Dressing Options Other (Comments) 03/31/21 1400   Dressing Changed Changed 03/31/21 1400   Dressing Status Open to Air 03/31/21 1400   Dressing Change/Treatment Frequency Every 72 hrs, and As Needed 03/25/21 1305   Non-staged Wound Description Full thickness 03/31/21 1400   Wound Length (cm) 0.5 cm 03/31/21 1400   Wound Width (cm) 0.3 cm 03/31/21 1400   Wound Surface Area (cm^2) 0.15 cm^2 03/31/21 1400   Post-Procedure Length (cm) 0.8 cm 03/31/21 1400   Post-Procedure Width (cm) 0.7 cm 03/31/21 1400   Post-Procedure Depth (cm) 0.1 cm 03/25/21 1305   Post-Procedure Surface Area (cm^2) 0.56 cm^2 03/31/21 1400   Post-Procedure Volume (cm^3) 0.08 cm^3 03/25/21 1305   Tunneling (cm) 0 cm 03/31/21 1400   Undermining (cm) 0 cm 03/31/21 1400   Wound Odor None 03/31/21 1400   Exposed Structures None 03/31/21 1400           PROCEDURE:   -2% viscous lidocaine applied topically to wound beds for approximately 5 minutes prior to debridement  -Curette used to debride wound beds.  Excisional debridement was performed to remove devitalized tissue until healthy, bleeding tissue was visualized.  Total area debrided 2.22 cm².  Tissue debrided into the subcutaneous layer.    -Bleeding controlled with manual pressure.    -Wound care completed by wound RN, refer to flowsheet  -Patient tolerated the procedure well, without c/o pain or discomfort.           PATIENT EDUCATION  -Etiology of pressure injury  -Importance of offloading  -Strategies for offloading in bed and when seated discussed and demonstrated  - Importance of adequate nutrition for wound healing  -Increase protein intake (unless contraindicated by renal status)   - Advised to go to ER for any increased redness, swelling, drainage  or odor, or if patient develops fever, chills, nausea or vomiting.    ASSESSMENT AND PLAN:     1. Pressure injury of buttock, stage 3, unspecified laterality (HCC)  Comments: Shallow stage III ulcers to bilateral medial buttocks, mirroring each other, kissing wounds.      3/31/2021: Recurrent ulcers to bilateral buttocks, complicated by obesity, moisture, limited mobility, and advanced age.  Total wound area has decreased since last assessment.  -Excisional debridement of wound in clinic today, medically necessary to promote wound healing.  -Patient to return to clinic weekly for assessment and debridement  -Patient to change dressing 1-2 times per week in between clinic visits  -We will send order for Triad paste, patient to apply 2 times per day and as needed after each incontinent episode    Wound care: Zinc barrier paste    2. Age-related physical debility  Comments: Patient does not ambulate much, spends most of her day sitting due to physical weakness and chronic back pain.     3/31/2021: Patient is elderly with multiple medical issues.  She and her  are having difficulty changing her dressing, requests home health.  Her  already has [facility], and they would like to stay with the same agency.  -Referral placed last week, referral message states that this needs to be denied by [facility] first before they can place authorization (?).  Will clarify    3. Obesity due to excess calories, unspecified classification, unspecified whether serious comorbidity present  Comments: Contributing factor, patient is severely obese.  Weight loss likely very difficult patient given back pain issues and advanced age.      3/31/2021: Encourage patient to ambulate several times during the day.    Patient was seen for 15minutes face to face of which > 50% of appointment time was spent on counseling and coordination of care regarding the above.      Please note that this dictation was created using  voice recognition software. I have worked with technical experts from [facility] to optimize the interface.  I have made every reasonable attempt to correct obvious errors, but there may be errors of grammar and possibly content that I did not discover before finalizing the note.

## 2021-04-02 ENCOUNTER — PATIENT MESSAGE (OUTPATIENT)
Dept: HEALTH INFORMATION MANAGEMENT | Facility: OTHER | Age: 81
End: 2021-04-02

## 2021-04-02 ENCOUNTER — TELEPHONE (OUTPATIENT)
Dept: WOUND CARE | Facility: MEDICAL CENTER | Age: 81
End: 2021-04-02

## 2021-04-02 NOTE — TELEPHONE ENCOUNTER
Received message that insurance will not cover whole PRISM order from patient, can follow up with patient at next appointment.. Patient may need to purchase extra supplies on own.

## 2021-04-06 ENCOUNTER — ANTICOAGULATION MONITORING (OUTPATIENT)
Dept: VASCULAR LAB | Facility: MEDICAL CENTER | Age: 81
End: 2021-04-06

## 2021-04-06 DIAGNOSIS — Z79.01 CHRONIC ANTICOAGULATION: ICD-10-CM

## 2021-04-06 LAB — INR PPP: 1.9 (ref 2–3.5)

## 2021-04-06 NOTE — PROGRESS NOTES
Anticoagulation Summary  As of 2021    INR goal:  2.0-3.0   TTR:  73.4 % (5.7 y)   INR used for dosin.90 (2021)   Warfarin maintenance plan:  3.75 mg (2.5 mg x 1.5) every Mon, Fri; 2.5 mg (2.5 mg x 1) all other days   Weekly warfarin total:  20 mg   Plan last modified:  Vladimir Taylor PharmD (3/30/2021)   Next INR check:     Target end date:  Indefinite    Indications    Atrial fibrillation (HCC) (Resolved) [I48.91]  Chronic anticoagulation [Z79.01]             Anticoagulation Episode Summary     INR check location:  Home Draw    Preferred lab:      Send INR reminders to:      Comments:  Winston YEAGER      Anticoagulation Care Providers     Provider Role Specialty Phone number    Lizzy Haywood M.D. Referring Cardiology [phone]    [facility]  [phone]    Vladimir Taylor PharmD Responsible          Anticoagulation Patient Findings    Spoke with patient today regarding sub-therapeutic INR of 1.90.  Patient denies any signs/symptoms of bruising or bleeding or any changes in diet and medications. Patient to continue warfarin dosing,  was a dose increase and wont reflect on  INR. Instructed patient to call clinic with any questions or concerns.    Follow up in 1 weeks, to reduce risk of adverse events related to this high risk medication,  Warfarin.    Discussed with Patti Joseph, PeterD.   Lynn Win - Student Pharmacist

## 2021-04-07 ENCOUNTER — OFFICE VISIT (OUTPATIENT)
Dept: WOUND CARE | Facility: MEDICAL CENTER | Age: 81
End: 2021-04-07
Attending: NURSE PRACTITIONER
Payer: MEDICARE

## 2021-04-07 VITALS
OXYGEN SATURATION: 95 % | HEART RATE: 85 BPM | RESPIRATION RATE: 20 BRPM | DIASTOLIC BLOOD PRESSURE: 61 MMHG | SYSTOLIC BLOOD PRESSURE: 140 MMHG | TEMPERATURE: 97.8 F

## 2021-04-07 DIAGNOSIS — E66.09 OBESITY DUE TO EXCESS CALORIES, UNSPECIFIED CLASSIFICATION, UNSPECIFIED WHETHER SERIOUS COMORBIDITY PRESENT: ICD-10-CM

## 2021-04-07 DIAGNOSIS — L89.303 PRESSURE INJURY OF BUTTOCK, STAGE 3, UNSPECIFIED LATERALITY (HCC): Primary | ICD-10-CM

## 2021-04-07 DIAGNOSIS — R54 AGE-RELATED PHYSICAL DEBILITY: ICD-10-CM

## 2021-04-07 PROCEDURE — 11042 DBRDMT SUBQ TIS 1ST 20SQCM/<: CPT

## 2021-04-07 PROCEDURE — 11042 DBRDMT SUBQ TIS 1ST 20SQCM/<: CPT | Performed by: NURSE PRACTITIONER

## 2021-04-07 ASSESSMENT — ENCOUNTER SYMPTOMS
CLAUDICATION: 0
SHORTNESS OF BREATH: 0
DEPRESSION: 0
NAUSEA: 0
VOMITING: 0
CONSTIPATION: 0
CHILLS: 0
DIARRHEA: 0
FEVER: 0
COUGH: 0
BACK PAIN: 1
NERVOUS/ANXIOUS: 0

## 2021-04-07 NOTE — PATIENT INSTRUCTIONS
-Keep your wound dressing clean, dry, and intact.    -Change your dressing if it becomes soiled, soaked, or falls off.    -Should you experience any significant changes in your wound(s), such as infection (redness, swelling, localized heat, increased pain, fever > 101 F, chills) or have any questions regarding your home care instructions, please contact the wound center at [phone]. If after hours, contact your primary care physician or go to the hospital emergency room.

## 2021-04-07 NOTE — PROGRESS NOTES
Provider Encounter- Pressure Injury    HISTORY OF PRESENT ILLNESS                 START OF CARE IN CLINIC: 3/17/2021  REFERRING PROVIDER: VICTORIANO Montes  WOUND- Full Thickness Wound  LOCATION: Left medial buttock, right medial buttock              WOUND HISTORY: Patient is an 78 year old female who has had wounds to her buttocks off and on for years..  She was treated for these wounds previously in the clinic, discharged most recently at the end of December 2020 due to healing.  Her wounds have now recurred, she was referred back to [facility].  She is unsure of how the wounds started, but does state that she has a bad back and is in her recliner chair day and night.  She has a Roho cushion for offloading for her chair.  She does get up several times per day to go to the bathroom, but otherwise does not ambulate much.  She also has problems with frequent urinary incontinence, wears absorptive pads.  Her  brings her food, does all the cooking, cleaning and shopping.                       PERTINENT PMH: Obesity, limited mobility, chronic back pain, CAD, bilateral knee replacements, right hip replacement    Contributing factors: Immobility -yes.  Activity level: -Sedentary.  Support surfaces: -Waffle cushion to chair.  Incontinence: No.  Nutritional state: Well-nourished. Caregiver assistance: . Obesity: Yes. Moisture: Yes      DIABETES: No    TOBACCO USE: She has never smoked or used tobacco products      Interval History:  3/25/2021: Initial provider visit with PHYLLIS Holguin, VIVIAN-BC, CWJERRYN, CFKIMBER.  Donte is well-known to me from previous treatment in clinic.  She is accompanied today by her .  Wounds recurred about a month ago.  She is still spending most of her time in her recliner chair, with Roho cushion, ambulates only to the bathroom, incontinent of urine, wears adult briefs.  While her skin was still intact, she was applying A&E ointment.  Hydrocolloid applied in clinic last  visit.  She has difficulty changing her dressing,  able to help but he has multiple health issues of his own.  She is requesting referral to home health, prefers [facility] as this is the agency that sees her .    3/31/2021 : Clinic visit with PHYLLIS Holguin, FNP-BC, CWOCN, CFCN.  Donte balderrama doing well overall, denies fevers, chills, nausea, vomiting, cough or shortness of breath.  Buttock wounds continue to be a problem, complicated by incontinence and immobility.  Dressing applied to wounds last week in clinic fell off within the first 24 hours.    4/7/2021: Clinic visit with PHYLLIS Marie. Patient states that they are feeling well today.  Patient denies fever, chills, nausea, vomiting, lightheadedness, dizziness, shortness of breath and chest pain.  Patient still has 2 open ulcers to right and left buttocks.  Provided the patient with written instructions regarding proper offloading cushions to look at and purchase 1.  Patient instructed to purchase either a Roho cushion or a waffle cushion for offloading.  Patient also instructed she can use a pillow just every 30 to 45 minutes from side to side to offload.  Patient appears to have a slight amount of fungal component to her periwound area nystatin powder mixed with Triad cream applied to wound beds.       REVIEW OF SYSTEMS:   Review of Systems   Constitutional: Negative for chills and fever.   Respiratory: Negative for cough and shortness of breath.         Shortness of breath with exertion   Cardiovascular: Positive for leg swelling. Negative for chest pain and claudication.   Gastrointestinal: Negative for constipation, diarrhea, nausea and vomiting.   Genitourinary:        Frequent incontinence of urine  Occasional incontinence of stool  Wears absorptive pad   Musculoskeletal: Positive for back pain and joint pain.        Chronic back and hip pain, debilitating   Skin:        Recurring buttock wounds for several years   Psychiatric/Behavioral:  Negative for depression. The patient is not nervous/anxious.        PHYSICAL EXAMINATION:   /61   Pulse 85   Temp 36.6 °C (97.8 °F)   Resp 20   LMP 01/01/1993   SpO2 95%   Physical Exam   Constitutional: She is oriented to person, place, and time.   Obese elderly female   HENT:   Head: Normocephalic.   Eyes: Pupils are equal, round, and reactive to light.   Pulmonary/Chest: Effort normal.   Musculoskeletal:         General: Edema present.      Comments: Generalized dependent bilateral lower extremity edema  Uses walker for ambulation   Neurological: She is alert and oriented to person, place, and time.   Skin: Skin is warm.   Ulcer to left buttocks  Ulcer to right buttocks  Refer to photos and flowsheet   Psychiatric: Mood, memory, affect and judgment normal.     Wound Assessment          Wound 03/17/21 Full Thickness Wound Buttocks Inner Right -Right Medial Buttock (Active)   Wound Image    04/07/21 1500   Site Assessment Pink;Yellow 04/07/21 1500   Periwound Assessment Dry;Scar tissue;Blanchable erythema 04/07/21 1500   Margins Attached edges 04/07/21 1500   Drainage Amount KESHA 04/07/21 1500   Drainage Description KESHA 04/07/21 1500   Treatments Cleansed;Topical Lidocaine;Provider debridement;Site care 04/07/21 1500   Wound Cleansing Puracyn [place] 04/07/21 1500   Periwound Protectant Barrier Paste 04/07/21 1500   Dressing Cleansing/Solutions Not Applicable 04/07/21 1500   Dressing Options Triad [place] 04/07/21 1500   Dressing Changed Changed 03/31/21 1400   Dressing Status Open to Air 04/07/21 1500   Dressing Change/Treatment Frequency Every 72 hrs, and As Needed 03/25/21 1305   Non-staged Wound Description Full thickness 04/07/21 1500   Wound Length (cm) 1.1 cm 04/07/21 1500   Wound Width (cm) 0.8 cm 04/07/21 1500   Wound Depth (cm) 0.2 cm 04/07/21 1500   Wound Surface Area (cm^2) 0.88 cm^2 04/07/21 1500   Wound Volume (cm^3) 0.18 cm^3 04/07/21 1500   Post-Procedure Length (cm) 1 cm 04/07/21 1500    Post-Procedure Width (cm) 0.8 cm 04/07/21 1500   Post-Procedure Depth (cm) 0.1 cm 04/07/21 1500   Post-Procedure Surface Area (cm^2) 0.8 cm^2 04/07/21 1500   Post-Procedure Volume (cm^3) 0.08 cm^3 04/07/21 1500   Wound Healing % 92 04/07/21 1500   Wound Bed Granulation (%) 30 % 03/25/21 1305   Wound Bed Epithelium (%) 0 % 03/25/21 1305   Wound Bed Slough (%) 70 % 03/25/21 1305   Wound Bed Eschar (%) 0 % 03/25/21 1305   Tunneling (cm) 0 cm 04/07/21 1500   Undermining (cm) 0 cm 04/07/21 1500   Wound Odor None 04/07/21 1500   Exposed Structures None 04/07/21 1500       Wound 03/25/21 Full Thickness Wound Buttocks Left -Left Medial Buttock cluster (Active)   Wound Image    04/07/21 1500   Site Assessment Pink;Yellow 04/07/21 1500   Periwound Assessment Dry;Scar tissue;Blanchable erythema 04/07/21 1500   Margins Attached edges 04/07/21 1500   Drainage Amount KESHA 04/07/21 1500   Drainage Description KESHA 04/07/21 1500   Treatments Cleansed;Topical Lidocaine;Provider debridement;Site care 04/07/21 1500   Wound Cleansing Puracyn [place] 04/07/21 1500   Periwound Protectant Barrier Paste 04/07/21 1500   Dressing Cleansing/Solutions Not Applicable 04/07/21 1500   Dressing Options Triad [place] 04/07/21 1500   Dressing Changed Changed 03/31/21 1400   Dressing Status Open to Air 04/07/21 1500   Dressing Change/Treatment Frequency Every 72 hrs, and As Needed 03/25/21 1305   Non-staged Wound Description Full thickness 04/07/21 1500   Wound Length (cm) 2 cm 04/07/21 1500   Wound Width (cm) 1.1 cm 04/07/21 1500   Wound Depth (cm) 0.1 cm 04/07/21 1500   Wound Surface Area (cm^2) 2.2 cm^2 04/07/21 1500   Wound Volume (cm^3) 0.22 cm^3 04/07/21 1500   Post-Procedure Length (cm) 2.5 cm 04/07/21 1500   Post-Procedure Width (cm) 2.9 cm 04/07/21 1500   Post-Procedure Depth (cm) 0.1 cm 04/07/21 1500   Post-Procedure Surface Area (cm^2) 7.25 cm^2 04/07/21 1500   Post-Procedure Volume (cm^3) 0.72 cm^3 04/07/21 1500   Tunneling (cm) 0 cm 04/07/21  1500   Undermining (cm) 0 cm 04/07/21 1500   Wound Odor None 04/07/21 1500   Exposed Structures None 04/07/21 1500          PROCEDURE:   -2% viscous lidocaine applied topically to wound beds for approximately 5 minutes prior to debridement  -Curette used to debride wound beds.  Excisional debridement was performed to remove devitalized tissue until healthy, bleeding tissue was visualized.  Total area debrided 8.05 cm².  Tissue debrided into the subcutaneous layer.    -Bleeding controlled with manual pressure.    -Wound care completed by wound RN, refer to flowsheet  -Patient tolerated the procedure well, without c/o pain or discomfort.           PATIENT EDUCATION  -Etiology of pressure injury  -Importance of offloading  -Strategies for offloading in bed and when seated discussed and demonstrated  - Importance of adequate nutrition for wound healing  -Increase protein intake (unless contraindicated by renal status)   - Advised to go to ER for any increased redness, swelling, drainage or odor, or if patient develops fever, chills, nausea or vomiting.    ASSESSMENT AND PLAN:     1. Pressure injury of buttock, stage 3, unspecified laterality (HCC)  Comments: Shallow stage III ulcers to bilateral medial buttocks, mirroring each other, kissing wounds.      04/07/21:   -Excisional debridement of wound in clinic today, medically necessary to promote wound healing.  -Patient to return to clinic weekly for assessment and debridement  -Patient to change dressing 1-2 times per week in between clinic visits  -Instructed patient to purchase either a waffle cushion or a Roho cushion for offloading  -Patient educated on the use of a pillow to rotate between the right and left hips to relieve pressure to the opposite side and to offload pressure sores.  Wound care: Nystatin powder mixed with Triad cream    2. Age-related physical debility  Comments: Patient does not ambulate much, spends most of her day sitting due to physical  weakness and chronic back pain.     04/07/21: Patient is elderly with multiple medical issues.  She and her  are having difficulty changing her dressing, requests home health.  Her  currently has [facility].  However, establishing the patient with [facility] was not possible therefore referral was sent to [facility].  Patient given number to contact [facility] to set up appointments.  We also faxed orders to [facility] for wound maintenance and dressings.    3. Obesity due to excess calories, unspecified classification, unspecified whether serious comorbidity present  Comments: Contributing factor, patient is severely obese.  Weight loss likely very difficult patient given back pain issues and advanced age.      04/07/21: Encourage patient to ambulate several times during the day.        Please note that this dictation was created using voice recognition software. I have worked with technical experts from [facility] to optimize the interface.  I have made every reasonable attempt to correct obvious errors, but there may be errors of grammar and possibly content that I did not discover before finalizing the note.

## 2021-04-12 DIAGNOSIS — B35.9 TINEA: ICD-10-CM

## 2021-04-13 ENCOUNTER — ANTICOAGULATION MONITORING (OUTPATIENT)
Dept: VASCULAR LAB | Facility: MEDICAL CENTER | Age: 81
End: 2021-04-13

## 2021-04-13 DIAGNOSIS — Z79.01 CHRONIC ANTICOAGULATION: ICD-10-CM

## 2021-04-13 LAB — INR PPP: 2.2 (ref 2–3.5)

## 2021-04-13 NOTE — PROGRESS NOTES
Anticoagulation Summary  As of 2021    INR goal:  2.0-3.0   TTR:  73.4 % (5.7 y)   INR used for dosin.20 (2021)   Warfarin maintenance plan:  3.75 mg (2.5 mg x 1.5) every Mon, Fri; 2.5 mg (2.5 mg x 1) all other days   Weekly warfarin total:  20 mg   Plan last modified:  Vladimir Taylor PharmD (3/30/2021)   Next INR check:     Target end date:  Indefinite    Indications    Atrial fibrillation (HCC) (Resolved) [I48.91]  Chronic anticoagulation [Z79.01]             Anticoagulation Episode Summary     INR check location:  Home Draw    Preferred lab:      Send INR reminders to:      Comments:  Winston YEAGER      Anticoagulation Care Providers     Provider Role Specialty Phone number    Lizzy Haywood M.D. Referring Cardiology [phone]    [facility] Responsible  [phone]    Vladimir Taylor, PharmD Responsible          Anticoagulation Patient Findings    Spoke with patient today regarding therapeutic INR of 2.20.  Patient denies any signs/symptoms of bruising or bleeding or any changes in diet and medications.  Instructed patient to call clinic with any questions or concerns.  Follow up in 1 weeks, to reduce risk of adverse events related to this high risk medication,  Warfarin.    Lynn Win - Student Pharmacist

## 2021-04-13 NOTE — TELEPHONE ENCOUNTER
Requested Prescriptions     Refused Prescriptions Disp Refills   • terconazole (TERAZOL 3) 0.8 % vaginal cream 20 g 1     Sig: Apply 2 times a day to yeast infection     Refused By: NORBERT PARADA     Reason for Refusal: Patient needs appointment.     TIMUR Montes.

## 2021-04-14 ENCOUNTER — PATIENT MESSAGE (OUTPATIENT)
Dept: HEALTH INFORMATION MANAGEMENT | Facility: OTHER | Age: 81
End: 2021-04-14

## 2021-04-14 ENCOUNTER — HOME HEALTH ADMISSION (OUTPATIENT)
Dept: HOME HEALTH SERVICES | Facility: HOME HEALTHCARE | Age: 81
End: 2021-04-14
Payer: MEDICARE

## 2021-04-14 ENCOUNTER — PATIENT OUTREACH (OUTPATIENT)
Dept: HEALTH INFORMATION MANAGEMENT | Facility: OTHER | Age: 81
End: 2021-04-14

## 2021-04-14 ENCOUNTER — OFFICE VISIT (OUTPATIENT)
Dept: WOUND CARE | Facility: MEDICAL CENTER | Age: 81
End: 2021-04-14
Attending: NURSE PRACTITIONER
Payer: MEDICARE

## 2021-04-14 VITALS
RESPIRATION RATE: 20 BRPM | DIASTOLIC BLOOD PRESSURE: 72 MMHG | HEART RATE: 97 BPM | SYSTOLIC BLOOD PRESSURE: 153 MMHG | TEMPERATURE: 97 F | OXYGEN SATURATION: 96 %

## 2021-04-14 DIAGNOSIS — R54 AGE-RELATED PHYSICAL DEBILITY: ICD-10-CM

## 2021-04-14 DIAGNOSIS — E66.09 OBESITY DUE TO EXCESS CALORIES, UNSPECIFIED CLASSIFICATION, UNSPECIFIED WHETHER SERIOUS COMORBIDITY PRESENT: ICD-10-CM

## 2021-04-14 DIAGNOSIS — L89.303 PRESSURE INJURY OF BUTTOCK, STAGE 3, UNSPECIFIED LATERALITY (HCC): Primary | ICD-10-CM

## 2021-04-14 PROCEDURE — 11042 DBRDMT SUBQ TIS 1ST 20SQCM/<: CPT

## 2021-04-14 PROCEDURE — 11042 DBRDMT SUBQ TIS 1ST 20SQCM/<: CPT | Performed by: NURSE PRACTITIONER

## 2021-04-14 ASSESSMENT — ENCOUNTER SYMPTOMS
NERVOUS/ANXIOUS: 0
FEVER: 0
NAUSEA: 0
BACK PAIN: 1
VOMITING: 0
CONSTIPATION: 0
CHILLS: 0
DIARRHEA: 0
DEPRESSION: 0
CLAUDICATION: 0
SHORTNESS OF BREATH: 0
COUGH: 0

## 2021-04-14 NOTE — PATIENT INSTRUCTIONS
-Keep your wound dressing clean, dry, and intact.    -Change your dressing if it becomes soiled, soaked, or falls off.    -Should you experience any significant changes in your wound(s), such as infection (redness, swelling, localized heat, increased pain, fever > 101 F, chills) or have any questions regarding your home care instructions, please contact the wound center at [phone]. If after hours, contact your primary care physician or go to the hospital emergency room.

## 2021-04-14 NOTE — PROGRESS NOTES
Provider Encounter- Pressure Injury    HISTORY OF PRESENT ILLNESS                 START OF CARE IN CLINIC: 3/17/2021  REFERRING PROVIDER: VICTORIANO Montes  WOUND- Full Thickness Wound  LOCATION: Left medial buttock, right medial buttock              WOUND HISTORY: Patient is an 78 year old female who has had wounds to her buttocks off and on for years..  She was treated for these wounds previously in the clinic, discharged most recently at the end of December 2020 due to healing.  Her wounds have now recurred, she was referred back to [facility].  She is unsure of how the wounds started, but does state that she has a bad back and is in her recliner chair day and night.  She has a Roho cushion for offloading for her chair.  She does get up several times per day to go to the bathroom, but otherwise does not ambulate much.  She also has problems with frequent urinary incontinence, wears absorptive pads.  Her  brings her food, does all the cooking, cleaning and shopping.                       PERTINENT PMH: Obesity, limited mobility, chronic back pain, CAD, bilateral knee replacements, right hip replacement    Contributing factors: Immobility -yes.  Activity level: -Sedentary.  Support surfaces: -Waffle cushion to chair.  Incontinence: No.  Nutritional state: Well-nourished. Caregiver assistance: . Obesity: Yes. Moisture: Yes      DIABETES: No    TOBACCO USE: She has never smoked or used tobacco products      Interval History:  3/25/2021: Initial provider visit with PHYLLIS Holguin, VIVIAN-BC, CWJERRYN, CFKIMBER.  Donte is well-known to me from previous treatment in clinic.  She is accompanied today by her .  Wounds recurred about a month ago.  She is still spending most of her time in her recliner chair, with Roho cushion, ambulates only to the bathroom, incontinent of urine, wears adult briefs.  While her skin was still intact, she was applying A&E ointment.  Hydrocolloid applied in clinic last  visit.  She has difficulty changing her dressing,  able to help but he has multiple health issues of his own.  She is requesting referral to home health, prefers [facility] as this is the agency that sees her .    3/31/2021 : Clinic visit with PHYLLIS Holguin, FNP-BC, CWOCN, CFCN.  Donte balderrama doing well overall, denies fevers, chills, nausea, vomiting, cough or shortness of breath.  Buttock wounds continue to be a problem, complicated by incontinence and immobility.  Dressing applied to wounds last week in clinic fell off within the first 24 hours.    4/7/2021: Clinic visit with PHYLLIS Marie. Patient states that they are feeling well today.  Patient denies fever, chills, nausea, vomiting, lightheadedness, dizziness, shortness of breath and chest pain.  Patient still has 2 open ulcers to right and left buttocks.  Provided the patient with written instructions regarding proper offloading cushions to look at and purchase 1.  Patient instructed to purchase either a Roho cushion or a waffle cushion for offloading.  Patient also instructed she can use a pillow just every 30 to 45 minutes from side to side to offload.  Patient appears to have a slight amount of fungal component to her periwound area nystatin powder mixed with Triad cream applied to wound beds.    4/14/2021: Clinic visit with PHYLLIS Marie. Patient states that they are feeling well today.  Patient denies fever, chills, nausea, vomiting, lightheadedness, dizziness, shortness of breath and chest pain.  Bilateral buttocks wounds appear to be improving with epithelial tissue throughout both wound beds.  Patient to continue with cream composed of Triad, nystatin powder and zinc cream.  We will place a referral [facility] patient has been denied by [facility] and [facility] due to living in [facility].       REVIEW OF SYSTEMS:   Review of Systems   Constitutional: Negative for chills and fever.   Respiratory: Negative for cough  and shortness of breath.         Shortness of breath with exertion   Cardiovascular: Positive for leg swelling. Negative for chest pain and claudication.   Gastrointestinal: Negative for constipation, diarrhea, nausea and vomiting.   Genitourinary:        Frequent incontinence of urine  Occasional incontinence of stool  Wears absorptive pad   Musculoskeletal: Positive for back pain and joint pain.        Chronic back and hip pain, debilitating   Skin:        Recurring buttock wounds for several years   Psychiatric/Behavioral: Negative for depression. The patient is not nervous/anxious.        PHYSICAL EXAMINATION:   /72   Pulse 97   Temp 36.1 °C (97 °F)   Resp 20   LMP 01/01/1993   SpO2 96%   Physical Exam   Constitutional: She is oriented to person, place, and time.   Obese elderly female   HENT:   Head: Normocephalic.   Eyes: Pupils are equal, round, and reactive to light.   Pulmonary/Chest: Effort normal.   Musculoskeletal:         General: Edema present.      Comments: Generalized dependent bilateral lower extremity edema  Uses walker for ambulation   Neurological: She is alert and oriented to person, place, and time.   Skin: Skin is warm.   Ulcer to left buttocks  Ulcer to right buttocks  Refer to photos and flowsheet   Psychiatric: Mood, memory, affect and judgment normal.     Wound Assessment             Wound 03/17/21 Full Thickness Wound Buttocks Inner Right -Right Medial Buttock (Active)   Wound Image    04/14/21 1300   Site Assessment Pink;Yellow 04/14/21 1300   Periwound Assessment Dry;Scar tissue;Blanchable erythema 04/14/21 1300   Margins Attached edges 04/14/21 1300   Drainage Amount Scant 04/14/21 1300   Drainage Description Serosanguineous 04/14/21 1300   Treatments Cleansed;Topical Lidocaine;Provider debridement;Site care 04/14/21 1300   Wound Cleansing [place] 04/14/21 1300   Periwound Protectant Barrier Paste;Antifungal Therapy 04/14/21 1300   Dressing Cleansing/Solutions Not  Applicable 04/14/21 1300   Dressing Options Triad [place] 04/14/21 1300   Dressing Changed Changed 03/31/21 1400   Dressing Status Open to Air 04/14/21 1300   Dressing Change/Treatment Frequency Every 72 hrs, and As Needed 03/25/21 1305   Non-staged Wound Description Partial thickness 04/14/21 1300   Wound Length (cm) 0.7 cm 04/14/21 1300   Wound Width (cm) 0.8 cm 04/14/21 1300   Wound Depth (cm) 0.2 cm 04/14/21 1300   Wound Surface Area (cm^2) 0.56 cm^2 04/14/21 1300   Wound Volume (cm^3) 0.11 cm^3 04/14/21 1300   Post-Procedure Length (cm) 0.5 cm 04/14/21 1300   Post-Procedure Width (cm) 0.2 cm 04/14/21 1300   Post-Procedure Depth (cm) 0.1 cm 04/14/21 1300   Post-Procedure Surface Area (cm^2) 0.1 cm^2 04/14/21 1300   Post-Procedure Volume (cm^3) 0.01 cm^3 04/14/21 1300   Wound Healing % 95 04/14/21 1300   Wound Bed Granulation (%) 30 % 03/25/21 1305   Wound Bed Epithelium (%) 0 % 03/25/21 1305   Wound Bed Slough (%) 70 % 03/25/21 1305   Wound Bed Eschar (%) 0 % 03/25/21 1305   Tunneling (cm) 0 cm 04/14/21 1300   Undermining (cm) 0 cm 04/14/21 1300   Wound Odor None 04/14/21 1300   Exposed Structures None 04/14/21 1300       Wound 03/25/21 Full Thickness Wound Buttocks Left -Left Medial Buttock cluster (Active)   Wound Image    04/14/21 1300   Site Assessment Pink;Yellow 04/14/21 1300   Periwound Assessment Dry;Scar tissue;Blanchable erythema 04/14/21 1300   Margins Attached edges 04/14/21 1300   Drainage Amount Scant 04/14/21 1300   Drainage Description Serosanguineous 04/14/21 1300   Treatments Cleansed;Topical Lidocaine;Provider debridement;Site care 04/14/21 1300   Wound Cleansing Puracyn [place] 04/14/21 1300   Periwound Protectant Barrier Paste;Antifungal Therapy 04/14/21 1300   Dressing Cleansing/Solutions Not Applicable 04/14/21 1300   Dressing Options Triad [place] 04/14/21 1300   Dressing Changed Changed 03/31/21 1400   Dressing Status Open to Air 04/14/21 1300   Dressing Change/Treatment Frequency Every 72  hrs, and As Needed 03/25/21 1305   Non-staged Wound Description Partial thickness 04/14/21 1300   Wound Length (cm) 1 cm 04/14/21 1300   Wound Width (cm) 0.5 cm 04/14/21 1300   Wound Depth (cm) 0.1 cm 04/14/21 1300   Wound Surface Area (cm^2) 0.5 cm^2 04/14/21 1300   Wound Volume (cm^3) 0.05 cm^3 04/14/21 1300   Post-Procedure Length (cm) 0.6 cm 04/14/21 1300   Post-Procedure Width (cm) 0.4 cm 04/14/21 1300   Post-Procedure Depth (cm) 0.1 cm 04/14/21 1300   Post-Procedure Surface Area (cm^2) 0.24 cm^2 04/14/21 1300   Post-Procedure Volume (cm^3) 0.02 cm^3 04/14/21 1300   Wound Healing % 77 04/14/21 1300   Tunneling (cm) 0 cm 04/14/21 1300   Undermining (cm) 0 cm 04/14/21 1300   Wound Odor None 04/14/21 1300   Exposed Structures None 04/14/21 1300                    PROCEDURE:   -2% viscous lidocaine applied topically to wound beds for approximately 5 minutes prior to debridement  -Curette used to debride wound beds.  Excisional debridement was performed to remove devitalized tissue until healthy, bleeding tissue was visualized.  Total area debrided  0.34 cm².  Tissue debrided into the subcutaneous layer.    -Bleeding controlled with manual pressure.    -Wound care completed by wound RN, refer to flowsheet  -Patient tolerated the procedure well, without c/o pain or discomfort.           PATIENT EDUCATION  -Etiology of pressure injury  -Importance of offloading  -Strategies for offloading in bed and when seated discussed and demonstrated  - Importance of adequate nutrition for wound healing  -Increase protein intake (unless contraindicated by renal status)   - Advised to go to ER for any increased redness, swelling, drainage or odor, or if patient develops fever, chills, nausea or vomiting.    ASSESSMENT AND PLAN:     1. Pressure injury of buttock, stage 3, unspecified laterality (HCC)  Comments: Shallow stage III ulcers to bilateral medial buttocks, mirroring each other, kissing wounds.      04/14/21:   -Excisional  debridement of wounds in clinic today, medically necessary to promote wound healing.  -Patient to return to clinic weekly for assessment and debridement  -Patient to reapply cream following every incontinent episode.  -Patient's  reports that they have ordered a new Roho cushion should arrive 4/15/2021  -Patient educated on the use of a pillow to rotate between the right and left hips to relieve pressure to the opposite side and to offload pressure sores.  Wound care: Nystatin powder mixed with Triad cream and zinc paste    2. Age-related physical debility  Comments: Patient does not ambulate much, spends most of her day sitting due to physical weakness and chronic back pain.     04/14/21: Patient is elderly with multiple medical issues.  She and her  are having difficulty changing her dressing, requests home health.  Her  currently has [facility].  However, establishing the patient with [facility] was not possible patient was referred to [facility] however they are unable to support this patient's needs at the time.  I will refer the patient to [facility] as we are exhausting other options.    3. Obesity due to excess calories, unspecified classification, unspecified whether serious comorbidity present  Comments: Contributing factor, patient is severely obese.  Weight loss likely very difficult patient given back pain issues and advanced age.      04/14/21: Encourage patient to ambulate several times during the day.        Please note that this dictation was created using voice recognition software. I have worked with technical experts from [facility] to optimize the interface.  I have made every reasonable attempt to correct obvious errors, but there may be errors of grammar and possibly content that I did not discover before finalizing the note.

## 2021-04-14 NOTE — PROGRESS NOTES
Outcome: Left voice message regarding Comprehensive Geriatric Assessment.       Please transfer to Patient Outreach Team at [phone] when patient returns call.      Attempt # 2

## 2021-04-15 ENCOUNTER — PATIENT MESSAGE (OUTPATIENT)
Dept: HEALTH INFORMATION MANAGEMENT | Facility: OTHER | Age: 81
End: 2021-04-15

## 2021-04-15 ENCOUNTER — PATIENT OUTREACH (OUTPATIENT)
Dept: HEALTH INFORMATION MANAGEMENT | Facility: OTHER | Age: 81
End: 2021-04-15

## 2021-04-15 NOTE — PROGRESS NOTES
Outcome: Comprehensive Geriatric Assessment  Monday, May 10, 2021  3:00 pm   Dr. Garrido - [address] [facility]    Please transfer to Patient Outreach Team at [phone] when patient returns call.        Attempt # 2

## 2021-04-15 NOTE — PROGRESS NOTES
Outcome: Scheduled Comprehensive Geriatric Assessment with APRN Brenda - [address] Friday, April 23, 2021  11:00 am (1 hour) [facility] sent    Please transfer to Patient Outreach Team at [phone] when patient returns call.      Attempt # 2

## 2021-04-19 ENCOUNTER — OFFICE VISIT (OUTPATIENT)
Dept: MEDICAL GROUP | Facility: PHYSICIAN GROUP | Age: 81
End: 2021-04-19
Payer: MEDICARE

## 2021-04-19 VITALS
RESPIRATION RATE: 14 BRPM | DIASTOLIC BLOOD PRESSURE: 60 MMHG | HEIGHT: 64 IN | HEART RATE: 96 BPM | SYSTOLIC BLOOD PRESSURE: 100 MMHG | TEMPERATURE: 98.6 F | WEIGHT: 198 LBS | OXYGEN SATURATION: 96 % | BODY MASS INDEX: 33.8 KG/M2

## 2021-04-19 DIAGNOSIS — I73.9 PVD (PERIPHERAL VASCULAR DISEASE) (HCC): ICD-10-CM

## 2021-04-19 DIAGNOSIS — N18.31 STAGE 3A CHRONIC KIDNEY DISEASE: ICD-10-CM

## 2021-04-19 DIAGNOSIS — E66.9 OBESITY (BMI 30-39.9): ICD-10-CM

## 2021-04-19 DIAGNOSIS — M32.9 PERSONAL HISTORY OF SYSTEMIC LUPUS ERYTHEMATOSUS (SLE) (HCC): ICD-10-CM

## 2021-04-19 DIAGNOSIS — Z02.89 ENCOUNTER FOR COMPLETION OF FORM WITH PATIENT: ICD-10-CM

## 2021-04-19 DIAGNOSIS — F32.0 CURRENT MILD EPISODE OF MAJOR DEPRESSIVE DISORDER WITHOUT PRIOR EPISODE (HCC): ICD-10-CM

## 2021-04-19 DIAGNOSIS — I10 ESSENTIAL HYPERTENSION: ICD-10-CM

## 2021-04-19 DIAGNOSIS — I48.19 PERSISTENT ATRIAL FIBRILLATION (HCC): ICD-10-CM

## 2021-04-19 DIAGNOSIS — R60.0 BILATERAL LOWER EXTREMITY EDEMA: ICD-10-CM

## 2021-04-19 DIAGNOSIS — L89.303 PRESSURE INJURY OF BUTTOCK, STAGE 3, UNSPECIFIED LATERALITY (HCC): ICD-10-CM

## 2021-04-19 PROBLEM — S09.90XA ACUTE HEAD TRAUMA, INITIAL ENCOUNTER: Status: RESOLVED | Noted: 2021-01-29 | Resolved: 2021-04-19

## 2021-04-19 PROCEDURE — 99214 OFFICE O/P EST MOD 30 MIN: CPT | Performed by: NURSE PRACTITIONER

## 2021-04-19 ASSESSMENT — FIBROSIS 4 INDEX: FIB4 SCORE: 1.8

## 2021-04-19 NOTE — PROGRESS NOTES
Annual Health Assessment Questions:    1.  Are you currently engaging in any exercise or physical activity? Yes    2.  How would you describe your mood or emotional well-being today? fair    3.  Have you had any falls in the last year? Yes    4.  Have you noticed any problems with your balance or had difficulty walking? No    5.  In the last six months have you experienced any leakage of urine? Yes    6. DPA/Advanced Directive: Patient has Advanced Directive, but it is not on file. Instructed to bring in a copy to scan into their chart.

## 2021-04-19 NOTE — ASSESSMENT & PLAN NOTE
Chronic medical problem.  She reports past history of lupus but was told was told it was not the type that affects her organs.  She does not take any medication.  She has no complaints today.

## 2021-04-19 NOTE — PROGRESS NOTES
Subjective:     CC: Follow-up and DMV paperwork    HPI:   Donte presents today with her  for the following:    Personal history of systemic lupus erythematosus (SLE) (HCC)  Chronic medical problem.  She reports past history of lupus but was told was told it was not the type that affects her organs.  She does not take any medication.  She has no complaints today.    Persistent atrial fibrillation (HCC)  Chronic medical problem.  She is followed with cardiology Dr. Richards. She is taking atenolol 50 milligrams BID and warfarin. She is followed by the anticoagulation clinic. She is checking her INR weekly.  Denies any hematuria, bloody stool, bloody noses, or bleeding gums.    Depression  Chronic medical problem. She is taking sertraline 50 mg daily.  She is tolerating medication.  She denies any self harm or SI.     Essential hypertension  Chronic medical problem.  She is taking lisinopril 5 mg daily and atenolol 50 mg BID. She does not check her blood pressure at home. She has not had lunch this afternoon.  She denies any chest pain, shortness of breath, dizziness, or headaches.    PVD (peripheral vascular disease) ([facility])  Chronic medical problem.  She was seen by her podiatrist, Dr. Russell Bergman, on 3/2/2020.  She had abnormal monofilament exam with decreased pedal pulses.  She is on warfarin, lisinopril, atenolol.    Stage 3a chronic kidney disease  Chronic medical problem.  She is not taking any NSAIDs.  Her blood pressure is controlled.  She is on lisinopril.  Last lab results:  Results for DONTE DAVENPORT (MRN [id number]) as of 4/19/2021 17:43   Ref. Range 1/29/2021 11:12   GFR If  Latest Ref Range: >60 mL/min/1.73 m 2 >60   GFR If Non  Latest Ref Range: >60 mL/min/1.73 m 2 50 (A)       Pressure injury of buttock, stage 3 (HCC)  Chronic medical problems.  She continues weekly follow-up with wound clinic for dressing changes.  Her  encourages her to change her position  "frequently.  Her  does look at the wound daily and states that it is looking better.    Obesity (BMI 30-39.9)  Chronic medical problem.  Her BMI today is 33.99 kg/m².      Past Medical History:   Diagnosis Date   • A-fib (HCC)    • Anesthesia     \"Tachycardia for 5 days after cataract surgery\"   • Anticoagulant long-term use 1/12/2012   • Arthritis     osteo-Knees, hips   • Atrial fibrillation (HCC)    • Backpain     R hip   • Bowel habit changes     diarrhea   • Breath shortness     with exertion, prn O2 2L   • Bronchitis Nov, 2013   • CAD (coronary artery disease)    • Depression    • Glaucoma 5/3/2011   • Hematoma complicating a procedure 11/3/2012   • Hemorrhagic disorder ([facility])     bruising/coumadin   • Hypertension    • Hypothyroid    • Lupus ([facility])    • Macular degeneration    • Menopause 1/12/2012   • Mitral regurgitation 10/30/2012   • Obesity 1/12/2012   • Pacemaker 2018   • Pneumonia feb,2013   • Pre-syncope 6/29/2018   • Pulmonary hypertension ([facility]) 10/30/2012   • PVC's (premature ventricular contractions) 1/12/2012   • Senile nuclear sclerosis    • Spinal stenosis of lumbar region at multiple levels    • Unspecified cataract     repaired bilateral   • Unspecified urinary incontinence    • Urinary bladder disorder        Social History     Tobacco Use   • Smoking status: Never Smoker   • Smokeless tobacco: Never Used   Substance Use Topics   • Alcohol use: No   • Drug use: No       Current Outpatient Medications Ordered in Epic   Medication Sig Dispense Refill   • Wound Dressings (TRIAD HYDROPHILIC WOUND DRESSI) Paste Apply 1 Application topically 2 Times a Day. 30 g 3   • magnesium oxide (MAG-OX) 400 MG Tab tablet Take 1 tablet by mouth every day. 40 tablet 1   • Triamcinolone Acetonide 0.025 % Lotion Apply 1 Squirt topically 2 Times a Day. Apply to itching ears. (Patient taking differently: Apply 1 Squirt topically 2 times a day as needed. Apply to itching ears.) 60 mL 0   • nystatin (MYCOSTATIN) " powder Apply 1 g topically 2 Times a Day. 15 g 1   • warfarin (COUMADIN) 2.5 MG Tab TAKE ONE TO ONE AND ONE-HALF TABLETS BY MOUTH EVERY DAY AS DIRECTED BY THE [facility] 135 Tab 1   • sertraline (ZOLOFT) 50 MG Tab Take 1 Tab by mouth every day. 90 Tab 3   • Mirabegron ER (MYRBETRIQ) 50 MG TABLET SR 24 HR Take 50 mg by mouth every day. 90 Tab 3   • ipratropium (ATROVENT) 0.03 % Solution Administer 2 Sprays into affected nostril(S) every 12 hours. 30 mL 2   • levothyroxine (SYNTHROID) 50 MCG Tab TAKE 1 TABLET BY MOUTH EVERY MORNING ON A EMPTY STOMACH 90 Tab 3   • estradiol (ESTRACE) 2 MG Tab Take 1 Tab by mouth every day. 90 Tab 2   • spironolactone (ALDACTONE) 50 MG Tab Take 1 Tab by mouth 2 times a day. 180 Tab 2   • nitrofurantoin (MACROBID) 100 MG Cap Take 50 mg by mouth every day.     • lisinopril (PRINIVIL) 5 MG Tab Take 1 Tab by mouth every day. 100 Tab 3   • atenolol (TENORMIN) 50 MG Tab Take 1 Tab by mouth 2 times a day. 200 Tab 3   • Acetaminophen 500 MG Cap Take 2 Caps by mouth 3 times a day as needed. Indications: Pain     • PREBIOTIC PRODUCT PO Take 2 Tabs by mouth every day.     • Lutein 20 MG Cap Take 1 Cap by mouth every day. 90 Cap 3   • vitamin D (CHOLECALCIFEROL) 1000 UNIT TABS Take 2,000 Units by mouth every day.       No current [facility]-ordered facility-administered medications on file.       Allergies:  Amiodarone, Bactrim, Cipro xr, Metoprolol, Morphine, Phytoplex z-guard [petrolatum-zinc oxide], Pseudoephedrine, Qvar [beclomethasone dipropionate], Vibramycin, Atorvastatin calcium-polysorbate 80, Augmentin, Diltiazem, Flecainide, Keflex, Mucinex, Tramadol, Atorvastatin, and Tape    Health Maintenance: Due for annual wellness visit and COVID-19 vaccine.  She declines her Covid vaccine and is in agreement to take this off her health maintenance.    ROS:  Gen: no fevers/chills, no changes in weight  Eyes: no changes in vision  ENT: no sore throat, no hearing loss, no bloody  "nose  Pulm: no sob, no cough  CV: no chest pain, no palpitations  GI: no nausea/vomiting, no diarrhea  : no dysuria  MSk: no myalgias  Skin: no rash  Neuro: no headaches, no numbness/tingling  Heme/Lymph: no easy bruising      Objective:     Vital signs reviewed  Exam:  /60   Pulse 96   Temp 37 °C (98.6 °F) (Temporal)   Resp 14   Ht 1.626 m (5' 4\")   Wt 89.8 kg (198 lb)   LMP 01/01/1993   SpO2 96%   BMI 33.99 kg/m²  Body mass index is 33.99 kg/m².    Gen: Alert and oriented, No apparent distress.   present in exam room.  Eyes:   Lids normal. Glasses in place.   Neck: Neck is supple without lymphadenopathy.  Lungs: Normal effort, CTA bilaterally, no wheezes, rhonchi, or rales  CV: Regular rate and rhythm. No murmurs, rubs, or gallops.  Left upper chest pacemaker in place.  Ext: No clubbing, cyanosis.  Using four-wheel walker with ambulation.  BLE edema 1-2+      Assessment & Plan:     80 y.o. female with the following -     1. Persistent atrial fibrillation (HCC)  Chronic stable problem. Continue atenolol.  Her blood pressure today is 100/60 and pulse rate of 96.  She has no acute complaints today.  I encouraged to continue with her atenolol continue follow-up with cardiology.  She will continue follow-up with anticoagulation clinic for her warfarin dosing.  Red flags discussed.    2. Essential hypertension  Chronic stable problem.  Continue atenolol, lisinopril, and spironolactone.  Discussed that her and her  that she should monitor her blood pressure and to reach out if her blood pressures become consistently in the 90s systolically.  Both her and her  verbalized understanding.  On repeat by me her blood pressure is 100/60 with a pulse rate 96.  She has no acute complaints today.  Red flags discussed.    3. Bilateral lower extremity edema  Chronic stable problem.  Continue spironolactone.  Discussed with patient to ambulate frequently and change positions frequently.  She " verbalized understanding.    4. PVD (peripheral vascular disease) ([facility])  Chronic stable problem.  Podiatry progress note reviewed today.  She is on spironolactone and lisinopril.  No acute complaints today.    5. Current mild episode of major depressive disorder without prior episode ([facility])  Chronic stable problem.  Continue sertraline.  She denies any self-harm or SI today.  Red flags discussed.    6. Pressure injury of buttock, stage 3, unspecified laterality ([facility])  Chronic stable problem.  Continue follow-up with wound care.  Continue frequent position changes.  She verbalized understanding.    7. Encounter for completion of form with patient  Acute uncomplicated problem.  DMV paperwork completed today with patient and patient's .    8. Personal history of systemic lupus erythematosus (SLE) ([facility])  Chronic stable problem.  History of lupus.  She has no acute complaints today.    9. Stage 3a chronic kidney disease  Chronic stable problem.  She is on lisinopril.  Blood pressure is at goal.  She does not take any NSAIDs.  Discussed the patient to continue to avoid NSAIDs.  She verbalized understanding.    10. Obesity (BMI 30-39.9)  Chronic stable problem.  BMI discussed and reviewed today.      Return in about 4 months (around 8/19/2021) for HTN, depression.    Please note that this dictation was created using voice recognition software. I have made every reasonable attempt to correct obvious errors, but I expect that there are errors of grammar and possibly content that I did not discover before finalizing the note.

## 2021-04-19 NOTE — ASSESSMENT & PLAN NOTE
Chronic medical problem.  She is taking lisinopril 5 mg daily and atenolol 50 mg BID. She does not check her blood pressure at home. She has not had lunch this afternoon.  She denies any chest pain, shortness of breath, dizziness, or headaches.

## 2021-04-19 NOTE — ASSESSMENT & PLAN NOTE
Chronic medical problem.  She is followed with cardiology Dr. Richards. She is taking atenolol 50 milligrams BID and warfarin. She is followed by the anticoagulation clinic. She is checking her INR weekly.  Denies any hematuria, bloody stool, bloody noses, or bleeding gums.

## 2021-04-19 NOTE — ASSESSMENT & PLAN NOTE
Chronic medical problem. She is taking sertraline 50 mg daily.  She is tolerating medication.  She denies any self harm or SI.

## 2021-04-20 ENCOUNTER — ANTICOAGULATION MONITORING (OUTPATIENT)
Dept: VASCULAR LAB | Facility: MEDICAL CENTER | Age: 81
End: 2021-04-20

## 2021-04-20 DIAGNOSIS — Z79.01 CHRONIC ANTICOAGULATION: ICD-10-CM

## 2021-04-20 LAB — INR PPP: 1.8 (ref 2–3.5)

## 2021-04-20 NOTE — PROGRESS NOTES
OP Anticoagulation Service Note    Date: 2021    Anticoagulation Summary  As of 2021    INR goal:  2.0-3.0   TTR:  73.3 % (5.8 y)   INR used for dosin.80 (2021)   Warfarin maintenance plan:  3.75 mg (2.5 mg x 1.5) every Mon, Fri; 2.5 mg (2.5 mg x 1) all other days   Weekly warfarin total:  20 mg   Plan last modified:  Vladimir Taylor, PharmD (3/30/2021)   Next INR check:  2021   Target end date:  Indefinite    Indications    Atrial fibrillation (HCC) (Resolved) [I48.91]  Chronic anticoagulation [Z79.01]             Anticoagulation Episode Summary     INR check location:  Home Draw    Preferred lab:      Send INR reminders to:      Comments:  Winston YEAGER      Anticoagulation Care Providers     Provider Role Specialty Phone number    Lizzy Haywood M.D. Referring Cardiology [phone]    [facility]ticoagulation [facility] Responsible  [phone]    Vladimir Taylor, PharmD Responsible          Anticoagulation Patient Findings      Voice message for patient regarding their anticoagulant.     HPI:   The reason for today's call is to prevent morbidity and mortality from a blood clot and/or stroke and to reduce the risk of bleeding while on a anticoagulant.     PCP:  VICTORIANO Montes  [address] [id number]    Assessment:     • INR  sub-therapeutic.       Current Outpatient Medications:   •  TRIAD HYDROPHILIC WOUND DRESSI, 1 Application, Apply externally, BID  •  magnesium oxide, 400 mg, Oral, DAILY  •  Triamcinolone Acetonide, 1 Squirt, Apply externally, BID (Patient taking differently: 1 Squirt, Apply externally, 2 TIMES DAILY PRN, Apply to itching ears.)  •  nystatin, 1 Application, Topical, BID  •  warfarin, TAKE ONE TO ONE AND ONE-HALF TABLETS BY MOUTH EVERY DAY AS DIRECTED BY THE [facility] ANTICOAGULATION CLINIC  •  sertraline, 50 mg, Oral, DAILY  •  Myrbetriq, 50 mg, Oral, DAILY  •  ipratropium, 2 Spray, Nasal, Q12HRS  •  levothyroxine, TAKE 1 TABLET BY MOUTH  EVERY MORNING ON A EMPTY STOMACH  •  estradiol, 2 mg, Oral, QDAY  •  spironolactone, 50 mg, Oral, BID  •  nitrofurantoin, 50 mg, Oral, DAILY  •  lisinopril, 5 mg, Oral, QDAY  •  atenolol, 50 mg, Oral, BID  •  Acetaminophen, 2 capsule, Oral, TID PRN  •  PREBIOTIC PRODUCT PO, 2 tablet, Oral, DAILY  •  Lutein, 1 capsule, Oral, DAILY  •  vitamin D, 2,000 Units, Oral, DAILY      Plan:     • 3.75mg today then continue the same warfarin dose, as noted above.       Follow-up:     • Our protocol suggests we test in 2 weeks.        Additional information discussed with patient:     • Asked patient to please call the anticoagulation clinic if they have any signs/symptoms of bleeding and/or thrombosis or any changes to diet or medications.      National recommendations regarding anticoagulation therapy:     The CHEST guidelines recommends frequent INR monitoring at regular intervals (a few days up to a max of 12 weeks) to ensure patients are on the proper dose of warfarin, and patients are not having any complications from therapy.  INRs can dramatically change over a short time period due to diet, medications, and medical conditions.       Fran Leyva, PharmD, MS, BCACP, C  [facility]  Phone [phone] fax [phone]    This note was created using voice recognition software (Dragon). The accuracy of the dictation is limited by the abilities of the software. I have reviewed the note prior to signing, however some errors in grammar and context are still possible. If you have any questions related to this note please do not hesitate to contact our office.

## 2021-04-20 NOTE — ASSESSMENT & PLAN NOTE
Chronic medical problem.  She is not taking any NSAIDs.  Her blood pressure is controlled.  She is on lisinopril.  Last lab results:  Results for STEPHANIE DAVENPORT (MRN [id number]) as of 4/19/2021 17:43   Ref. Range 1/29/2021 11:12   GFR If  Latest Ref Range: >60 mL/min/1.73 m 2 >60   GFR If Non  Latest Ref Range: >60 mL/min/1.73 m 2 50 (A)

## 2021-04-20 NOTE — ASSESSMENT & PLAN NOTE
Chronic medical problems.  She continues weekly follow-up with wound clinic for dressing changes.  Her  encourages her to change her position frequently.  Her  does look at the wound daily and states that it is looking better.

## 2021-04-20 NOTE — ASSESSMENT & PLAN NOTE
Chronic medical problem.  She was seen by her podiatrist, Dr. Russell Bergman, on 3/2/2020.  She had abnormal monofilament exam with decreased pedal pulses.  She is on warfarin, lisinopril, atenolol.

## 2021-04-21 ENCOUNTER — OFFICE VISIT (OUTPATIENT)
Dept: WOUND CARE | Facility: MEDICAL CENTER | Age: 81
End: 2021-04-21
Attending: NURSE PRACTITIONER
Payer: MEDICARE

## 2021-04-21 DIAGNOSIS — E66.09 OBESITY DUE TO EXCESS CALORIES, UNSPECIFIED CLASSIFICATION, UNSPECIFIED WHETHER SERIOUS COMORBIDITY PRESENT: ICD-10-CM

## 2021-04-21 DIAGNOSIS — L89.303 PRESSURE INJURY OF BUTTOCK, STAGE 3, UNSPECIFIED LATERALITY (HCC): ICD-10-CM

## 2021-04-21 DIAGNOSIS — R54 AGE-RELATED PHYSICAL DEBILITY: ICD-10-CM

## 2021-04-21 PROCEDURE — 11042 DBRDMT SUBQ TIS 1ST 20SQCM/<: CPT | Performed by: NURSE PRACTITIONER

## 2021-04-21 PROCEDURE — 99213 OFFICE O/P EST LOW 20 MIN: CPT | Mod: 25

## 2021-04-21 PROCEDURE — 11042 DBRDMT SUBQ TIS 1ST 20SQCM/<: CPT

## 2021-04-21 PROCEDURE — 99212 OFFICE O/P EST SF 10 MIN: CPT | Mod: 25 | Performed by: NURSE PRACTITIONER

## 2021-04-21 ASSESSMENT — ENCOUNTER SYMPTOMS
CONSTIPATION: 0
DEPRESSION: 0
CHILLS: 0
BACK PAIN: 1
NERVOUS/ANXIOUS: 0
COUGH: 0
VOMITING: 0
NAUSEA: 0
CLAUDICATION: 0
DIARRHEA: 0
SHORTNESS OF BREATH: 0
FEVER: 0

## 2021-04-21 NOTE — PROGRESS NOTES
Provider Encounter- Pressure Injury    HISTORY OF PRESENT ILLNESS                 START OF CARE IN CLINIC: 3/17/2021  REFERRING PROVIDER: VICTORIANO Montes  WOUND- Full Thickness Wound  LOCATION: Left medial buttock, right medial buttock              WOUND HISTORY: Patient is an 78 year old female who has had wounds to her buttocks off and on for years..  She was treated for these wounds previously in the clinic, discharged most recently at the end of December 2020 due to healing.  Her wounds have now recurred, she was referred back to [facility].  She is unsure of how the wounds started, but does state that she has a bad back and is in her recliner chair day and night.  She has a Roho cushion for offloading for her chair.  She does get up several times per day to go to the bathroom, but otherwise does not ambulate much.  She also has problems with frequent urinary incontinence, wears absorptive pads.  Her  brings her food, does all the cooking, cleaning and shopping.                       PERTINENT PMH: Obesity, limited mobility, chronic back pain, CAD, bilateral knee replacements, right hip replacement    Contributing factors: Immobility -yes.  Activity level: -Sedentary.  Support surfaces: -Waffle cushion to chair.  Incontinence: No.  Nutritional state: Well-nourished. Caregiver assistance: . Obesity: Yes. Moisture: Yes      DIABETES: No    TOBACCO USE: She has never smoked or used tobacco products      Interval History:  3/25/2021: Initial provider visit with PHYLLIS Holguin, VIVIAN-BC, CWJERRYN, CFKIMBER.  Donte is well-known to me from previous treatment in clinic.  She is accompanied today by her .  Wounds recurred about a month ago.  She is still spending most of her time in her recliner chair, with Roho cushion, ambulates only to the bathroom, incontinent of urine, wears adult briefs.  While her skin was still intact, she was applying A&E ointment.  Hydrocolloid applied in clinic last  visit.  She has difficulty changing her dressing,  able to help but he has multiple health issues of his own.  She is requesting referral to home health, prefers [facility] as this is the agency that sees her .    3/31/2021 : Clinic visit with PHYLLIS Holguin, MATILDE, MARITZA, JAMES.  Donte s doing well overall, denies fevers, chills, nausea, vomiting, cough or shortness of breath.  Buttock wounds continue to be a problem, complicated by incontinence and immobility.  Dressing applied to wounds last week in clinic fell off within the first 24 hours.    4/7/2021: Clinic visit with PYHLLIS Marie. Patient states that they are feeling well today.  Patient denies fever, chills, nausea, vomiting, lightheadedness, dizziness, shortness of breath and chest pain.  Patient still has 2 open ulcers to right and left buttocks.  Provided the patient with written instructions regarding proper offloading cushions to look at and purchase 1.  Patient instructed to purchase either a Roho cushion or a waffle cushion for offloading.  Patient also instructed she can use a pillow just every 30 to 45 minutes from side to side to offload.  Patient appears to have a slight amount of fungal component to her periwound area nystatin powder mixed with Triad cream applied to wound beds.    4/14/2021: Clinic visit with PHYLLIS Marie. Patient states that they are feeling well today.  Patient denies fever, chills, nausea, vomiting, lightheadedness, dizziness, shortness of breath and chest pain.  Bilateral buttocks wounds appear to be improving with epithelial tissue throughout both wound beds.  Patient to continue with cream composed of Triad, nystatin powder and zinc cream.  We will [facility] patient has been denied by [facility] and [facility] due to living in [facility].    4/21/2021 : Clinic visit with PHYLLIS Holguin, MATILDE, MARITZA, JAMES.  Donte presents today accompanied by her , who has  been taking care of her wounds.  She states she is feeling well overall, but frustrated with lack of healing of these wounds.   has been applying Triad paste several times per day, patient has Roho cushion for her chair.       REVIEW OF SYSTEMS:   Review of Systems   Constitutional: Negative for chills and fever.   Respiratory: Negative for cough and shortness of breath.         Shortness of breath with exertion   Cardiovascular: Positive for leg swelling. Negative for chest pain and claudication.   Gastrointestinal: Negative for constipation, diarrhea, nausea and vomiting.   Genitourinary:        Frequent incontinence of urine  Occasional incontinence of stool  Wears absorptive pad   Musculoskeletal: Positive for back pain and joint pain.        Chronic back and hip pain, debilitating   Skin:        Recurring buttock wounds for several years   Psychiatric/Behavioral: Negative for depression. The patient is not nervous/anxious.        PHYSICAL EXAMINATION:     Physical Exam   Constitutional: She is oriented to person, place, and time.   Obese elderly female   HENT:   Head: Normocephalic.   Eyes: Pupils are equal, round, and reactive to light.   Pulmonary/Chest: Effort normal.   Musculoskeletal:         General: Edema present.      Comments: Generalized dependent bilateral lower extremity edema  Uses walker for ambulation   Neurological: She is alert and oriented to person, place, and time.   Skin: Skin is warm.   Ulcer to left buttocks  Ulcer to right buttocks  Refer to photos and flowsheet   Psychiatric: Mood, memory, affect and judgment normal.     Wound Assessment    Wound 03/17/21 Full Thickness Wound Buttocks Inner Right -Right Medial Buttock (Active)   Wound Image    04/21/21 1300   Site Assessment Pink;Yellow 04/21/21 1300   Periwound Assessment Dry;Scar tissue;Callused 04/21/21 1300   Margins Attached edges 04/21/21 1300   Closure Secondary intention 04/21/21 1300   Drainage Amount Small 04/21/21 1300    Drainage Description Serosanguineous 04/21/21 1300   Treatments Cleansed;Topical Lidocaine;Provider debridement 04/21/21 1300   Wound Cleansing Approved Wound Cleanser 04/21/21 1300   Periwound Protectant Skin Protectant Wipes to Periwound 04/21/21 1300   Dressing Cleansing/Solutions Normal Saline 04/21/21 1300   Dressing Options Hydrocolloid Thick;Hydrofiber 04/21/21 1300   Dressing Changed New 04/21/21 1300   Dressing Status Clean;Dry;Intact 04/21/21 1300   Dressing Change/Treatment Frequency Every 72 hrs, and As Needed 04/21/21 1300   Non-staged Wound Description Partial thickness 04/21/21 1300   Wound Length (cm) 0.1 cm 04/21/21 1300   Wound Width (cm) 0.1 cm 04/21/21 1300   Wound Depth (cm) 0.1 cm 04/21/21 1300   Wound Surface Area (cm^2) 0.01 cm^2 04/21/21 1300   Wound Volume (cm^3) 0 cm^3 04/21/21 1300   Post-Procedure Length (cm) 0.9 cm 04/21/21 1300   Post-Procedure Width (cm) 2 cm 04/21/21 1300   Post-Procedure Depth (cm) 0.2 cm 04/21/21 1300   Post-Procedure Surface Area (cm^2) 1.8 cm^2 04/21/21 1300   Post-Procedure Volume (cm^3) 0.36 cm^3 04/21/21 1300   Wound Healing % 100 04/21/21 1300   Wound Bed Granulation (%) 10 % 04/21/21 1300   Wound Bed Epithelium (%) 0 % 03/25/21 1305   Wound Bed Slough (%) 70 % 03/25/21 1305   Wound Bed Eschar (%) 90 % 04/21/21 1300   Wound Bed Granulation (%) - Post-Procedure 100 % 04/21/21 1300   Tunneling (cm) 0 cm 04/21/21 1300   Undermining (cm) 0 cm 04/21/21 1300   Wound Odor None 04/21/21 1300   Exposed Structures None 04/21/21 1300       Wound 03/25/21 Full Thickness Wound Buttocks Left -Left Medial Buttock cluster (Active)   Wound Image    04/21/21 1300   Site Assessment Pink;Yellow 04/21/21 1300   Periwound Assessment Dry;Scar tissue;Callused 04/21/21 1300   Margins Attached edges 04/21/21 1300   Closure Secondary intention 04/21/21 1300   Drainage Amount Small 04/21/21 1300   Drainage Description Serosanguineous 04/21/21 1300   Treatments Cleansed;Provider  debridement;Topical Lidocaine 04/21/21 1300   Wound Cleansing Approved Wound Cleanser 04/21/21 1300   Periwound Protectant Skin Protectant Wipes to Periwound 04/21/21 1300   Dressing Cleansing/Solutions Normal Saline 04/21/21 1300   Dressing Options Hydrocolloid Thick;Hypafix Tape 04/21/21 1300   Dressing Changed New 04/21/21 1300   Dressing Status Clean;Dry;Intact 04/21/21 1300   Dressing Change/Treatment Frequency Every 72 hrs, and As Needed 04/21/21 1300   Non-staged Wound Description Partial thickness 04/21/21 1300   Wound Length (cm) 0.4 cm 04/21/21 1300   Wound Width (cm) 0.2 cm 04/21/21 1300   Wound Depth (cm) 0.1 cm 04/21/21 1300   Wound Surface Area (cm^2) 0.08 cm^2 04/21/21 1300   Wound Volume (cm^3) 0.01 cm^3 04/21/21 1300   Post-Procedure Length (cm) 0.6 cm 04/21/21 1300   Post-Procedure Width (cm) 0.2 cm 04/21/21 1300   Post-Procedure Depth (cm) 0.1 cm 04/14/21 1300   Post-Procedure Surface Area (cm^2) 0.12 cm^2 04/21/21 1300   Post-Procedure Volume (cm^3) 0.02 cm^3 04/14/21 1300   Wound Healing % 95 04/21/21 1300   Wound Bed Granulation (%) 20 % 04/21/21 1300   Wound Bed Eschar (%) 80 % 04/21/21 1300   Wound Bed Granulation (%) - Post-Procedure 100 % 04/21/21 1300   Tunneling (cm) 0 cm 04/21/21 1300   Undermining (cm) 0 cm 04/21/21 1300   Wound Odor None 04/21/21 1300   Exposed Structures None 04/21/21 1300           PROCEDURE:   -2% viscous lidocaine applied topically to wound beds for approximately 5 minutes prior to debridement  -Curette used to debride wound beds.  Excisional debridement was performed to remove devitalized tissue until healthy, bleeding tissue was visualized.  Total area debrided  1.92cm².  Tissue debrided into the subcutaneous layer.    -Bleeding controlled with manual pressure.    -Wound care completed by wound RN, refer to flowsheet  -Patient tolerated the procedure well, without c/o pain or discomfort.           PATIENT EDUCATION  -Etiology of pressure injury  -Importance of  offloading  -Strategies for offloading in bed and when seated discussed and demonstrated  - Importance of adequate nutrition for wound healing  -Increase protein intake (unless contraindicated by renal status)   - Advised to go to ER for any increased redness, swelling, drainage or odor, or if patient develops fever, chills, nausea or vomiting.    ASSESSMENT AND PLAN:     1. Pressure injury of buttock, stage 3, unspecified laterality (HCC)  Comments: Shallow stage III ulcers to bilateral medial buttocks, mirroring each other, kissing wounds.      04/21/21:Total of area of wounds continues to wax and wane.  Complicated by constant pressure, limited mobility, incontinence.  -Excisional debridement of wounds in clinic today, medically necessary to promote wound healing.  -Patient to return to clinic weekly for assessment and debridement  -Patient to leave hydrocolloid in place.  If it becomes dislodged, resume Triad paste  -Patient now has Roho cushion in her chair  -Patient educated on the use of a pillow to rotate between the right and left hips to relieve pressure to the opposite side and to offload pressure sores.    Wound care: Hydrocolloid to promote autolytic debridement and to manage bioburden, foam cover dressing    2. Age-related physical debility  Comments: Patient does not ambulate much, spends most of her day sitting due to physical weakness and chronic back pain.     04/21/21: Patient is elderly with multiple medical issues.  She and her  are having difficulty changing her dressing, requests home health.  Her  currently has [facility].  However, establishing the patient with [facility] was not possible patient was referred to [facility] however they are unable to support this patient's needs at the time.  I will refer the patient to [facility] as we are exhausting other options.    3. Obesity due to excess calories, unspecified classification, unspecified whether serious  comorbidity present  Comments: Contributing factor, patient is severely obese.  Weight loss likely very difficult patient given back pain issues and advanced age.      04/21/21: Encourage patient to ambulate several times during the day.    15 min spent  with patient, time spent counseling, coordinating care, reviewing records, discussing POC, educating patient regarding wound healing and progression.  This time was spent in excess to procedure time.     Please note that this dictation was created using voice recognition software. I have worked with technical experts from [facility] to optimize the interface.  I have made every reasonable attempt to correct obvious errors, but there may be errors of grammar and possibly content that I did not discover before finalizing the note.

## 2021-04-21 NOTE — PATIENT INSTRUCTIONS
Should you experience any significant changes in your wound(s) such as infection (redness, swelling, localized heat, increased pain, fever >101 F, chills) or have any questions regarding your home care instructions, please contact the wound center [phone]. If after hours, contact your primary care physician or go the hospital emergency room.  Keep dressing clean and dry and cover while bathing. Only change dressing if over saturated, soiled or its falling off.       Continue to get adequate pressure relief, use ROHO cushion when sitting, try to rotate hip to hip to keep pressure off buttocks.

## 2021-04-28 ENCOUNTER — OFFICE VISIT (OUTPATIENT)
Dept: WOUND CARE | Facility: MEDICAL CENTER | Age: 81
End: 2021-04-28
Attending: NURSE PRACTITIONER
Payer: MEDICARE

## 2021-04-28 VITALS
TEMPERATURE: 98.2 F | SYSTOLIC BLOOD PRESSURE: 115 MMHG | OXYGEN SATURATION: 93 % | DIASTOLIC BLOOD PRESSURE: 62 MMHG | RESPIRATION RATE: 18 BRPM | HEART RATE: 94 BPM

## 2021-04-28 DIAGNOSIS — L89.303 PRESSURE INJURY OF BUTTOCK, STAGE 3, UNSPECIFIED LATERALITY (HCC): ICD-10-CM

## 2021-04-28 DIAGNOSIS — R54 AGE-RELATED PHYSICAL DEBILITY: ICD-10-CM

## 2021-04-28 DIAGNOSIS — E66.09 OBESITY DUE TO EXCESS CALORIES, UNSPECIFIED CLASSIFICATION, UNSPECIFIED WHETHER SERIOUS COMORBIDITY PRESENT: ICD-10-CM

## 2021-04-28 PROCEDURE — 99212 OFFICE O/P EST SF 10 MIN: CPT

## 2021-04-28 PROCEDURE — 11042 DBRDMT SUBQ TIS 1ST 20SQCM/<: CPT | Performed by: NURSE PRACTITIONER

## 2021-04-28 PROCEDURE — 11042 DBRDMT SUBQ TIS 1ST 20SQCM/<: CPT

## 2021-04-28 PROCEDURE — 99212 OFFICE O/P EST SF 10 MIN: CPT | Mod: 25 | Performed by: NURSE PRACTITIONER

## 2021-04-28 ASSESSMENT — ENCOUNTER SYMPTOMS
BACK PAIN: 1
CLAUDICATION: 0
VOMITING: 0
DIARRHEA: 0
NERVOUS/ANXIOUS: 0
CONSTIPATION: 0
FEVER: 0
NAUSEA: 0
SHORTNESS OF BREATH: 0
CHILLS: 0
COUGH: 0
DEPRESSION: 0

## 2021-04-28 ASSESSMENT — PAIN SCALES - GENERAL: PAINLEVEL: NO PAIN

## 2021-04-28 NOTE — PATIENT INSTRUCTIONS
-Keep your wound dressing clean, dry, and intact.    -Change your dressing if it becomes soiled, soaked, or falls off.    -Should you experience any significant changes in your wound(s), such as infection (redness, swelling, localized heat, increased pain, fever > 101 F, chills) or have any questions regarding your home care instructions, please contact the wound center at [phone]. If after hours, contact your primary care physician or go to the hospital emergency room.

## 2021-04-28 NOTE — PROGRESS NOTES
Provider Encounter- Pressure Injury    HISTORY OF PRESENT ILLNESS                 START OF CARE IN CLINIC: 3/17/2021  REFERRING PROVIDER: VICTORIANO Montes  WOUND- Full Thickness Wound  LOCATION: Left medial buttock, right medial buttock              WOUND HISTORY: Patient is an 78 year old female who has had wounds to her buttocks off and on for years..  She was treated for these wounds previously in the clinic, discharged most recently at the end of December 2020 due to healing.  Her wounds have now recurred, she was referred back to [facility].  She is unsure of how the wounds started, but does state that she has a bad back and is in her recliner chair day and night.  She has a Roho cushion for offloading for her chair.  She does get up several times per day to go to the bathroom, but otherwise does not ambulate much.  She also has problems with frequent urinary incontinence, wears absorptive pads.  Her  brings her food, does all the cooking, cleaning and shopping.                       PERTINENT PMH: Obesity, limited mobility, chronic back pain, CAD, bilateral knee replacements, right hip replacement    Contributing factors: Immobility -yes.  Activity level: -Sedentary.  Support surfaces: -Waffle cushion to chair.  Incontinence: No.  Nutritional state: Well-nourished. Caregiver assistance: . Obesity: Yes. Moisture: Yes      DIABETES: No    TOBACCO USE: She has never smoked or used tobacco products      Interval History:  3/25/2021: Initial provider visit with PHYLLIS Holguin, VIVIAN-BC, CWJERRYN, CFKIMBER.  Donte is well-known to me from previous treatment in clinic.  She is accompanied today by her .  Wounds recurred about a month ago.  She is still spending most of her time in her recliner chair, with Roho cushion, ambulates only to the bathroom, incontinent of urine, wears adult briefs.  While her skin was still intact, she was applying A&E ointment.  Hydrocolloid applied in clinic last  visit.  She has difficulty changing her dressing,  able to help but he has multiple health issues of his own.  She is requesting referral to home health, prefers [facility] as this is the agency that sees her .    3/31/2021 : Clinic visit with PHYLLIS Holguin, MATILDE, MARITZA, JAMES.  Donte s doing well overall, denies fevers, chills, nausea, vomiting, cough or shortness of breath.  Buttock wounds continue to be a problem, complicated by incontinence and immobility.  Dressing applied to wounds last week in clinic fell off within the first 24 hours.    4/7/2021: Clinic visit with PHYLLIS Marie. Patient states that they are feeling well today.  Patient denies fever, chills, nausea, vomiting, lightheadedness, dizziness, shortness of breath and chest pain.  Patient still has 2 open ulcers to right and left buttocks.  Provided the patient with written instructions regarding proper offloading cushions to look at and purchase 1.  Patient instructed to purchase either a Roho cushion or a waffle cushion for offloading.  Patient also instructed she can use a pillow just every 30 to 45 minutes from side to side to offload.  Patient appears to have a slight amount of fungal component to her periwound area nystatin powder mixed with Triad cream applied to wound beds.    4/14/2021: Clinic visit with PHYLLIS Marie. Patient states that they are feeling well today.  Patient denies fever, chills, nausea, vomiting, lightheadedness, dizziness, shortness of breath and chest pain.  Bilateral buttocks wounds appear to be improving with epithelial tissue throughout both wound beds.  Patient to continue with cream composed of Triad, nystatin powder and zinc cream.  We will place [facility] patient has been denied by [facility] and [facility] due to living in [facility].    4/21/2021 : Clinic visit with PHYLLIS Holguin, MATILDE, MARITZA, JAMES.  Donte presents today accompanied by her , who has  been taking care of her wounds.  She states she is feeling well overall, but frustrated with lack of healing of these wounds.   has been applying Triad paste several times per day, patient has Roho cushion for her chair.    4/28/2021 : Clinic visit with Kelly Ansari, PHYLLIS, FNJARRELL-BC, DOTTYN, CFKIMBER.  Donte is again accompanied by her  today.  She states that the hydrocolloid's applied to her wounds last week had to be removed because she felt they kept her from having a BM.  Since then they have resumed applying Triad paste mixed with nystatin powder.  Patient has a Roho cushion for her chair, which to move to the car whenever she goes to appointments.           REVIEW OF SYSTEMS:   Review of Systems   Constitutional: Negative for chills and fever.   Respiratory: Negative for cough and shortness of breath.         Shortness of breath with exertion   Cardiovascular: Positive for leg swelling. Negative for chest pain and claudication.   Gastrointestinal: Negative for constipation, diarrhea, nausea and vomiting.   Genitourinary:        Frequent incontinence of urine  Occasional incontinence of stool  Wears absorptive pad   Musculoskeletal: Positive for back pain and joint pain.        Chronic back and hip pain, debilitating   Skin:        Recurring buttock wounds for several years   Psychiatric/Behavioral: Negative for depression. The patient is not nervous/anxious.        PHYSICAL EXAMINATION:     Physical Exam   Constitutional: She is oriented to person, place, and time.   Obese elderly female   HENT:   Head: Normocephalic.   Eyes: Pupils are equal, round, and reactive to light.   Pulmonary/Chest: Effort normal.   Musculoskeletal:         General: Edema present.      Comments: Generalized dependent bilateral lower extremity edema  Uses walker for ambulation   Neurological: She is alert and oriented to person, place, and time.   Skin: Skin is warm.   Ulcer to left buttocks  Ulcer to right buttocks  Refer to  photos and flowsheet   Psychiatric: Mood, memory, affect and judgment normal.     Wound Assessment    Wound 03/17/21 Full Thickness Wound Buttocks Inner Right -Right Medial Buttock (Active)   Wound Image    04/28/21 1345   Site Assessment Red;Yellow 04/28/21 1345   Periwound Assessment Dry;Scar tissue;Callused 04/28/21 1345   Margins Attached edges 04/28/21 1345   Closure Secondary intention 04/21/21 1300   Drainage Amount KESHA 04/28/21 1345   Drainage Description Serosanguineous 04/21/21 1300   Treatments Cleansed;Topical Lidocaine;Provider debridement 04/28/21 1345   Wound Cleansing Normal Saline Irrigation 04/28/21 1345   Periwound Protectant Barrier Paste;Antifungal Therapy 04/28/21 1345   Dressing Cleansing/Solutions Not Applicable 04/28/21 1345   Dressing Options Triad [place];Other (Comments) 04/28/21 1345   Dressing Changed New 04/28/21 1345   Dressing Status Clean;Dry;Intact 04/21/21 1300   Dressing Change/Treatment Frequency As Needed 04/28/21 1345   Non-staged Wound Description Full thickness 04/28/21 1345   Wound Length (cm) 0.6 cm 04/28/21 1345   Wound Width (cm) 1.1 cm 04/28/21 1345   Wound Depth (cm) 0.1 cm 04/28/21 1345   Wound Surface Area (cm^2) 0.66 cm^2 04/28/21 1345   Wound Volume (cm^3) 0.07 cm^3 04/28/21 1345   Post-Procedure Length (cm) 0.5 cm 04/28/21 1345   Post-Procedure Width (cm) 1.1 cm 04/28/21 1345   Post-Procedure Depth (cm) 0.1 cm 04/28/21 1345   Post-Procedure Surface Area (cm^2) 0.55 cm^2 04/28/21 1345   Post-Procedure Volume (cm^3) 0.06 cm^3 04/28/21 1345   Wound Healing % 97 04/28/21 1345   Wound Bed Granulation (%) 10 % 04/21/21 1300   Wound Bed Epithelium (%) 0 % 03/25/21 1305   Wound Bed Slough (%) 70 % 03/25/21 1305   Wound Bed Eschar (%) 90 % 04/21/21 1300   Wound Bed Granulation (%) - Post-Procedure 100 % 04/21/21 1300   Tunneling (cm) 0 cm 04/28/21 1345   Undermining (cm) 0 cm 04/28/21 1345   Wound Odor None 04/28/21 1345   Exposed Structures None 04/28/21 1345       Wound  03/25/21 Full Thickness Wound Buttocks Left -Left Medial Buttock cluster (Active)   Wound Image    04/28/21 1345   Site Assessment Red;Yellow 04/28/21 1345   Periwound Assessment Dry;Scar tissue;Callused 04/28/21 1345   Margins Attached edges 04/28/21 1345   Closure Secondary intention 04/21/21 1300   Drainage Amount KESHA 04/28/21 1345   Drainage Description Serosanguineous 04/21/21 1300   Treatments Cleansed;Topical Lidocaine;Provider debridement 04/28/21 1345   Wound Cleansing Normal Saline Irrigation 04/28/21 1345   Periwound Protectant Barrier Paste;Antifungal Therapy 04/28/21 1345   Dressing Cleansing/Solutions Not Applicable 04/28/21 1345   Dressing Options Triad [place];Other (Comments) 04/28/21 1345   Dressing Changed New 04/28/21 1345   Dressing Status Clean;Dry;Intact 04/21/21 1300   Dressing Change/Treatment Frequency As Needed 04/28/21 1345   Non-staged Wound Description Full thickness 04/28/21 1345   Wound Length (cm) 0.6 cm 04/28/21 1345   Wound Width (cm) 0.7 cm 04/28/21 1345   Wound Depth (cm) 0.1 cm 04/28/21 1345   Wound Surface Area (cm^2) 0.42 cm^2 04/28/21 1345   Wound Volume (cm^3) 0.04 cm^3 04/28/21 1345   Post-Procedure Length (cm) 0.9 cm 04/28/21 1345   Post-Procedure Width (cm) 0.6 cm 04/28/21 1345   Post-Procedure Depth (cm) 0.1 cm 04/28/21 1345   Post-Procedure Surface Area (cm^2) 0.54 cm^2 04/28/21 1345   Post-Procedure Volume (cm^3) 0.05 cm^3 04/28/21 1345   Wound Healing % 82 04/28/21 1345   Wound Bed Granulation (%) 20 % 04/21/21 1300   Wound Bed Eschar (%) 80 % 04/21/21 1300   Wound Bed Granulation (%) - Post-Procedure 100 % 04/21/21 1300   Tunneling (cm) 0 cm 04/28/21 1345   Undermining (cm) 0 cm 04/28/21 1345   Wound Odor None 04/28/21 1345   Exposed Structures None 04/28/21 1345              PROCEDURE:   -2% viscous lidocaine applied topically to wound beds for approximately 5 minutes prior to debridement  -Curette used to debride wound beds and to open closed wound edges.  Excisional  debridement was performed to remove devitalized tissue until healthy, bleeding tissue was visualized.  Total area debrided  1.09cm².  Tissue debrided into the subcutaneous layer.    -Bleeding controlled with manual pressure.    -Wound care completed by wound RN, refer to flowsheet  -Patient tolerated the procedure well, without c/o pain or discomfort.           PATIENT EDUCATION  -Etiology of pressure injury  -Importance of offloading  -Strategies for offloading in bed and when seated discussed and demonstrated  - Importance of adequate nutrition for wound healing  -Increase protein intake (unless contraindicated by renal status)   - Advised to go to ER for any increased redness, swelling, drainage or odor, or if patient develops fever, chills, nausea or vomiting.    ASSESSMENT AND PLAN:     1. Pressure injury of buttock, stage 3, unspecified laterality (HCC)  Comments: Shallow stage III ulcers to bilateral medial buttocks, mirroring each other, kissing wounds.      04/28/21:Total of area of wounds continues to wax and wane.  Complicated by constant pressure, limited mobility, incontinence.  Due to these issues, healing of these wounds may not be possible.  Discussed with patient and her .  Offered to decrease frequency of clinic visits to every other week,  said he would prefer that she continue to be seen weekly.  -Excisional debridement of wounds in clinic today, medically necessary to promote wound healing.  -Patient to return to clinic weekly for assessment and debridement  -Continue with triad paste, may mix with nystatin powder  -Roho cushion on any surface patient is sitting on  -Patient educated on the use of a pillow to rotate between the right and left hips to relieve pressure to the opposite side and to offload pressure sores.    Wound care: Triad paste, apply to affected areas at least daily, and as needed after each BM or voiding episode    2. Age-related physical debility  Comments:  Patient does not ambulate much, spends most of her day sitting due to physical weakness and chronic back pain.     04/28/21: Patient is elderly with multiple medical issues.  She has been referred to 3 different home health agencies none of which were able to accommodate her at this time.  We will continue to try referrals in the future    3. Obesity due to excess calories, unspecified classification, unspecified whether serious comorbidity present  Comments: Contributing factor, patient is severely obese.  Weight loss likely very difficult patient given back pain issues and advanced age.      04/28/21: Encourage patient to ambulate several times during the day.    15 min spent  with patient, time spent counseling, coordinating care, reviewing records, discussing POC, educating patient regarding wound healing and progression.  This time was spent in excess to procedure time.     Please note that this dictation was created using voice recognition software. I have worked with technical experts from [facility] to optimize the interface.  I have made every reasonable attempt to correct obvious errors, but there may be errors of grammar and possibly content that I did not discover before finalizing the note.

## 2021-05-04 ENCOUNTER — ANTICOAGULATION MONITORING (OUTPATIENT)
Dept: VASCULAR LAB | Facility: MEDICAL CENTER | Age: 81
End: 2021-05-04

## 2021-05-04 DIAGNOSIS — Z79.01 CHRONIC ANTICOAGULATION: ICD-10-CM

## 2021-05-04 LAB — INR PPP: 2 (ref 2–3.5)

## 2021-05-04 NOTE — PROGRESS NOTES
Anticoagulation Summary  As of 2021    INR goal:  2.0-3.0   TTR:  72.9 % (5.8 y)   INR used for dosin.00 (2021)   Warfarin maintenance plan:  3.75 mg (2.5 mg x 1.5) every Mon, Fri; 2.5 mg (2.5 mg x 1) all other days   Weekly warfarin total:  20 mg   Plan last modified:  Vladimir Taylor PharmD (3/30/2021)   Next INR check:  2021   Target end date:  Indefinite    Indications    Atrial fibrillation (HCC) (Resolved) [I48.91]  Chronic anticoagulation [Z79.01]             Anticoagulation Episode Summary     INR check location:  Home Draw    Preferred lab:      Send INR reminders to:      Comments:  Winston YEAGER      Anticoagulation Care Providers     Provider Role Specialty Phone number    Lizzy Haywood M.D. Referring Cardiology [phone]    [facility] Responsible  [phone]    Peter HollowayD Responsible          Anticoagulation Patient Findings     Spoke with patients  today regarding therapeutic INR of 2.0.  Patient denies any signs/symptoms of bruising or bleeding or any changes in diet and medications.  Instructed patient to call clinic with any questions or concerns.  Pt is to continue with current warfarin dosing regimen.  Follow up in 2 weeks, to reduce risk of adverse events related to this high risk medication,  Warfarin.    Vladimir Taylor, Tim, BCACP

## 2021-05-05 ENCOUNTER — OFFICE VISIT (OUTPATIENT)
Dept: WOUND CARE | Facility: MEDICAL CENTER | Age: 81
End: 2021-05-05
Attending: NURSE PRACTITIONER
Payer: MEDICARE

## 2021-05-05 VITALS
HEART RATE: 85 BPM | DIASTOLIC BLOOD PRESSURE: 61 MMHG | RESPIRATION RATE: 18 BRPM | SYSTOLIC BLOOD PRESSURE: 113 MMHG | OXYGEN SATURATION: 94 % | TEMPERATURE: 98.2 F

## 2021-05-05 DIAGNOSIS — E66.09 OBESITY DUE TO EXCESS CALORIES, UNSPECIFIED CLASSIFICATION, UNSPECIFIED WHETHER SERIOUS COMORBIDITY PRESENT: ICD-10-CM

## 2021-05-05 DIAGNOSIS — L89.303 PRESSURE INJURY OF BUTTOCK, STAGE 3, UNSPECIFIED LATERALITY (HCC): Primary | ICD-10-CM

## 2021-05-05 DIAGNOSIS — R54 AGE-RELATED PHYSICAL DEBILITY: ICD-10-CM

## 2021-05-05 PROCEDURE — 99212 OFFICE O/P EST SF 10 MIN: CPT | Performed by: NURSE PRACTITIONER

## 2021-05-05 PROCEDURE — 99213 OFFICE O/P EST LOW 20 MIN: CPT

## 2021-05-05 ASSESSMENT — ENCOUNTER SYMPTOMS
DEPRESSION: 0
DIARRHEA: 0
NAUSEA: 0
CLAUDICATION: 0
BACK PAIN: 1
FEVER: 0
CONSTIPATION: 0
VOMITING: 0
NERVOUS/ANXIOUS: 0
COUGH: 0
SHORTNESS OF BREATH: 0
CHILLS: 0

## 2021-05-05 NOTE — PROGRESS NOTES
Provider Encounter- Pressure Injury    HISTORY OF PRESENT ILLNESS                 START OF CARE IN CLINIC: 3/17/2021  REFERRING PROVIDER: VICTORIANO Montes  WOUND- Full Thickness Wound  LOCATION: Left medial buttock, right medial buttock              WOUND HISTORY: Patient is an 78 year old female who has had wounds to her buttocks off and on for years..  She was treated for these wounds previously in the clinic, discharged most recently at the end of December 2020 due to healing.  Her wounds have now recurred, she was referred back to [facility].  She is unsure of how the wounds started, but does state that she has a bad back and is in her recliner chair day and night.  She has a Roho cushion for offloading for her chair.  She does get up several times per day to go to the bathroom, but otherwise does not ambulate much.  She also has problems with frequent urinary incontinence, wears absorptive pads.  Her  brings her food, does all the cooking, cleaning and shopping.                       PERTINENT PMH: Obesity, limited mobility, chronic back pain, CAD, bilateral knee replacements, right hip replacement    Contributing factors: Immobility -yes.  Activity level: -Sedentary.  Support surfaces: -Waffle cushion to chair.  Incontinence: No.  Nutritional state: Well-nourished. Caregiver assistance: . Obesity: Yes. Moisture: Yes      DIABETES: No    TOBACCO USE: She has never smoked or used tobacco products      Interval History:  3/25/2021: Initial provider visit with PHYLLIS Holguin, VIVIAN-BC, CWJERRYN, CFKIMBER.  Donte is well-known to me from previous treatment in clinic.  She is accompanied today by her .  Wounds recurred about a month ago.  She is still spending most of her time in her recliner chair, with Roho cushion, ambulates only to the bathroom, incontinent of urine, wears adult briefs.  While her skin was still intact, she was applying A&E ointment.  Hydrocolloid applied in clinic last  visit.  She has difficulty changing her dressing,  able to help but he has multiple health issues of his own.  She is requesting referral to home health, prefers [facility] as this is the agency that sees her .    3/31/2021 : Clinic visit with PHYLLIS Holguin, MATILDE, MARITZA, JAMES.  Donte s doing well overall, denies fevers, chills, nausea, vomiting, cough or shortness of breath.  Buttock wounds continue to be a problem, complicated by incontinence and immobility.  Dressing applied to wounds last week in clinic fell off within the first 24 hours.    4/7/2021: Clinic visit with PHYLLIS Marie. Patient states that they are feeling well today.  Patient denies fever, chills, nausea, vomiting, lightheadedness, dizziness, shortness of breath and chest pain.  Patient still has 2 open ulcers to right and left buttocks.  Provided the patient with written instructions regarding proper offloading cushions to look at and purchase 1.  Patient instructed to purchase either a Roho cushion or a waffle cushion for offloading.  Patient also instructed she can use a pillow just every 30 to 45 minutes from side to side to offload.  Patient appears to have a slight amount of fungal component to her periwound area nystatin powder mixed with Triad cream applied to wound beds.    4/14/2021: Clinic visit with PHYLLIS Marie. Patient states that they are feeling well today.  Patient denies fever, chills, nausea, vomiting, lightheadedness, dizziness, shortness of breath and chest pain.  Bilateral buttocks wounds appear to be improving with epithelial tissue throughout both wound beds.  Patient to continue with cream composed of Triad, nystatin powder and zinc cream.  We will place a [facility] patient has been denied by [facility] and [facility] due to living in [facility].    4/21/2021 : Clinic visit with PHYLLIS Holguin, MATILDE, MARITZA, JAMES.  Donte presents today accompanied by her , who has  been taking care of her wounds.  She states she is feeling well overall, but frustrated with lack of healing of these wounds.   has been applying Triad paste several times per day, patient has Roho cushion for her chair.    4/28/2021 : Clinic visit with PHYLLIS Holguin, FNP-BC, CWOCN, CFCN.  Donte is again accompanied by her  today.  She states that the hydrocolloid's applied to her wounds last week had to be removed because she felt they kept her from having a BM.  Since then they have resumed applying Triad paste mixed with nystatin powder.  Patient has a Roho cushion for her chair, which to move to the car whenever she goes to appointments.        5/5/2021: Clinic visit with PHYLLIS Marie. Patient states that they are feeling well today.  Patient denies fever, chills, nausea, vomiting, lightheadedness, dizziness, shortness of breath and chest pain.  Patient's wounds are crusted over.  We have applied stoma powder and barrier wipe for crusting of the area prior to applying Triad cream.  Patient is still spending a significant amount of time on her bottom although she has purchased a Roho cushion which is beneficial.  Patient has been instructed on offloading strategies previously reiterated the strategies in clinic today.       REVIEW OF SYSTEMS:   Review of Systems   Constitutional: Negative for chills and fever.   Respiratory: Negative for cough and shortness of breath.         Shortness of breath with exertion   Cardiovascular: Positive for leg swelling. Negative for chest pain and claudication.   Gastrointestinal: Negative for constipation, diarrhea, nausea and vomiting.   Genitourinary:        Frequent incontinence of urine  Occasional incontinence of stool  Wears absorptive pad   Musculoskeletal: Positive for back pain and joint pain.        Chronic back and hip pain, debilitating   Skin:        Recurring buttock wounds for several years   Psychiatric/Behavioral: Negative for  depression. The patient is not nervous/anxious.        PHYSICAL EXAMINATION:     Physical Exam   Constitutional: She is oriented to person, place, and time.   Obese elderly female   HENT:   Head: Normocephalic.   Eyes: Pupils are equal, round, and reactive to light.   Pulmonary/Chest: Effort normal.   Musculoskeletal:         General: Edema present.      Comments: Generalized dependent bilateral lower extremity edema  Uses walker for ambulation   Neurological: She is alert and oriented to person, place, and time.   Skin: Skin is warm.   Ulcer to left buttocks  Ulcer to right buttocks  Refer to photos and flowsheet   Psychiatric: Mood, memory, affect and judgment normal.     Wound Assessment                Wound 03/17/21 Full Thickness Wound Buttocks Inner Right -Right Medial Buttock (Active)   Wound Image   05/05/21 1200   Site Assessment Dry 05/05/21 1200   Periwound Assessment Dry;Scar tissue;Callused 05/05/21 1200   Margins Attached edges 04/28/21 1345   Closure Secondary intention 04/21/21 1300   Drainage Amount KESHA 04/28/21 1345   Drainage Description Serosanguineous 04/21/21 1300   Treatments Cleansed;Topical Lidocaine;Provider debridement 04/28/21 1345   Wound Cleansing Normal Saline Irrigation 04/28/21 1345   Periwound Protectant Barrier Paste;Antifungal Therapy 04/28/21 1345   Dressing Cleansing/Solutions Not Applicable 04/28/21 1345   Dressing Options Triad [place];Other (Comments) 04/28/21 1345   Dressing Changed New 04/28/21 1345   Dressing Status Clean;Dry;Intact 04/21/21 1300   Dressing Change/Treatment Frequency As Needed 05/05/21 1200   Non-staged Wound Description Full thickness 05/05/21 1200   Wound Length (cm) 0.6 cm 04/28/21 1345   Wound Width (cm) 1.1 cm 04/28/21 1345   Wound Depth (cm) 0.1 cm 04/28/21 1345   Wound Surface Area (cm^2) 0.66 cm^2 04/28/21 1345   Wound Volume (cm^3) 0.07 cm^3 04/28/21 1345   Post-Procedure Length (cm) 0.5 cm 04/28/21 1345   Post-Procedure Width (cm) 1.1 cm 04/28/21  1345   Post-Procedure Depth (cm) 0.1 cm 04/28/21 1345   Post-Procedure Surface Area (cm^2) 0.55 cm^2 04/28/21 1345   Post-Procedure Volume (cm^3) 0.06 cm^3 04/28/21 1345   Wound Healing % 97 04/28/21 1345   Wound Bed Granulation (%) 10 % 04/21/21 1300   Wound Bed Epithelium (%) 0 % 03/25/21 1305   Wound Bed Slough (%) 70 % 03/25/21 1305   Wound Bed Eschar (%) 90 % 04/21/21 1300   Wound Bed Granulation (%) - Post-Procedure 100 % 04/21/21 1300   Tunneling (cm) 0 cm 04/28/21 1345   Undermining (cm) 0 cm 04/28/21 1345   Wound Odor None 04/28/21 1345   Exposed Structures None 04/28/21 1345       Wound 03/25/21 Full Thickness Wound Buttocks Left -Left Medial Buttock cluster (Active)   Wound Image   05/05/21 1200   Site Assessment Dry 05/05/21 1200   Periwound Assessment Dry;Scar tissue;Callused 05/05/21 1200   Margins Attached edges 04/28/21 1345   Closure Secondary intention 04/21/21 1300   Drainage Amount KESHA 04/28/21 1345   Drainage Description Serosanguineous 04/21/21 1300   Treatments Cleansed;Topical Lidocaine;Provider debridement 04/28/21 1345   Wound Cleansing Normal Saline Irrigation 04/28/21 1345   Periwound Protectant Barrier Paste;Antifungal Therapy 04/28/21 1345   Dressing Cleansing/Solutions Not Applicable 04/28/21 1345   Dressing Options Triad [place];Other (Comments) 04/28/21 1345   Dressing Changed New 04/28/21 1345   Dressing Status Clean;Dry;Intact 04/21/21 1300   Dressing Change/Treatment Frequency As Needed 05/05/21 1200   Non-staged Wound Description Full thickness 05/05/21 1200   Wound Length (cm) 0.6 cm 04/28/21 1345   Wound Width (cm) 0.7 cm 04/28/21 1345   Wound Depth (cm) 0.1 cm 04/28/21 1345   Wound Surface Area (cm^2) 0.42 cm^2 04/28/21 1345   Wound Volume (cm^3) 0.04 cm^3 04/28/21 1345   Post-Procedure Length (cm) 0.9 cm 04/28/21 1345   Post-Procedure Width (cm) 0.6 cm 04/28/21 1345   Post-Procedure Depth (cm) 0.1 cm 04/28/21 1345   Post-Procedure Surface Area (cm^2) 0.54 cm^2 04/28/21 1345    Post-Procedure Volume (cm^3) 0.05 cm^3 04/28/21 1345   Wound Healing % 82 04/28/21 1345   Wound Bed Granulation (%) 20 % 04/21/21 1300   Wound Bed Eschar (%) 80 % 04/21/21 1300   Wound Bed Granulation (%) - Post-Procedure 100 % 04/21/21 1300   Tunneling (cm) 0 cm 04/28/21 1345   Undermining (cm) 0 cm 04/28/21 1345   Wound Odor None 04/28/21 1345   Exposed Structures None 04/28/21 1345          PROCEDURE:   Patient assessed in clinic today.  No excisional debridement required.  Patient's wounds have scabbed over.  We did further crust the wound bed and periwound area with ostomy powder with No Sting barrier film.          PATIENT EDUCATION  -Etiology of pressure injury  -Importance of offloading  -Strategies for offloading in bed and when seated discussed and demonstrated  - Importance of adequate nutrition for wound healing  -Increase protein intake (unless contraindicated by renal status)   - Advised to go to ER for any increased redness, swelling, drainage or odor, or if patient develops fever, chills, nausea or vomiting.    ASSESSMENT AND PLAN:     1. Pressure injury of buttock, stage 3, unspecified laterality (HCC)  Comments: Shallow stage III ulcers to bilateral medial buttocks, mirroring each other, kissing wounds.      5/5/2021: Wounds are crusted in clinic today.  Wounds are complicated by constant pressure, limited mobility, incontinence.  Patient has purchased a Roho cushion which should be of some benefit.  Patient however, needs to continue to work on offloading with the use of a pillow and rotating frequently at least hourly.  -Wound bed crusted with stoma powder and wound barrier wipes  -I offered to the patient and  to come every 2 weeks for assessment and possible debridement.  However they would like to continue to come weekly for more close watchful eye on the wound beds.  -Continue with triad paste  -Roho cushion on any surface patient is sitting on    Wound care: Crusting with stoma  powder and skin protectant/barrier, Triad cream    2. Age-related physical debility  Comments: Patient does not ambulate much, spends most of her day sitting due to physical weakness and chronic back pain.     5/5/2021: Patient is elderly with multiple medical issues.  She has been referred to 3 different home health agencies none of which were able to accommodate her at this time.  We will continue to try referrals in the future    3. Obesity due to excess calories, unspecified classification, unspecified whether serious comorbidity present  Comments: Contributing factor, patient is severely obese.  Weight loss likely very difficult patient given back pain issues and advanced age.      5/5/2021: Encourage patient to ambulate several times during the day.    > 10 min spent face to face with patient, >50% of time spent counseling, coordinating care, reviewing records, discussing POC, educating patient.  Time spent in above procedural time.      Please note that this dictation was created using voice recognition software. I have worked with technical experts from [facility] to optimize the interface.  I have made every reasonable attempt to correct obvious errors, but there may be errors of grammar and possibly content that I did not discover before finalizing the note.

## 2021-05-05 NOTE — PATIENT INSTRUCTIONS
Should you experience any significant changes in your wound(s) such as infection (redness, swelling, localized heat, increased pain, fever >101 F, chills) or have any questions regarding your home care instructions, please contact the wound center [phone]. If after hours, contact your primary care physician or go the hospital emergency room.  Keep area clean and dry and re-apply the Triad cream as needed

## 2021-05-07 ENCOUNTER — NON-PROVIDER VISIT (OUTPATIENT)
Dept: CARDIOLOGY | Facility: MEDICAL CENTER | Age: 81
End: 2021-05-07
Payer: MEDICARE

## 2021-05-07 ENCOUNTER — OFFICE VISIT (OUTPATIENT)
Dept: CARDIOLOGY | Facility: MEDICAL CENTER | Age: 81
End: 2021-05-07

## 2021-05-07 VITALS
BODY MASS INDEX: 33.46 KG/M2 | HEART RATE: 83 BPM | OXYGEN SATURATION: 94 % | DIASTOLIC BLOOD PRESSURE: 64 MMHG | SYSTOLIC BLOOD PRESSURE: 116 MMHG | WEIGHT: 196 LBS | HEIGHT: 64 IN

## 2021-05-07 DIAGNOSIS — N18.31 STAGE 3A CHRONIC KIDNEY DISEASE: ICD-10-CM

## 2021-05-07 DIAGNOSIS — I25.10 CORONARY ARTERY DISEASE INVOLVING NATIVE CORONARY ARTERY OF NATIVE HEART WITHOUT ANGINA PECTORIS: ICD-10-CM

## 2021-05-07 DIAGNOSIS — Z79.899 HIGH RISK MEDICATION USE: ICD-10-CM

## 2021-05-07 DIAGNOSIS — L89.303 PRESSURE INJURY OF BUTTOCK, STAGE 3, UNSPECIFIED LATERALITY (HCC): ICD-10-CM

## 2021-05-07 DIAGNOSIS — M48.00 SPINAL STENOSIS, MULTILEVEL: ICD-10-CM

## 2021-05-07 DIAGNOSIS — I73.9 PVD (PERIPHERAL VASCULAR DISEASE) (HCC): ICD-10-CM

## 2021-05-07 DIAGNOSIS — I10 ESSENTIAL HYPERTENSION: ICD-10-CM

## 2021-05-07 DIAGNOSIS — Z95.0 CARDIAC PACEMAKER IN SITU: ICD-10-CM

## 2021-05-07 DIAGNOSIS — M32.9 PERSONAL HISTORY OF SYSTEMIC LUPUS ERYTHEMATOSUS (SLE) (HCC): ICD-10-CM

## 2021-05-07 DIAGNOSIS — I48.19 PERSISTENT ATRIAL FIBRILLATION (HCC): ICD-10-CM

## 2021-05-07 DIAGNOSIS — Z79.01 CHRONIC ANTICOAGULATION: ICD-10-CM

## 2021-05-07 PROCEDURE — 99999 PR NO CHARGE: CPT | Performed by: INTERNAL MEDICINE

## 2021-05-07 PROCEDURE — 99214 OFFICE O/P EST MOD 30 MIN: CPT | Performed by: INTERNAL MEDICINE

## 2021-05-07 RX ORDER — TORSEMIDE 20 MG/1
20 TABLET ORAL 2 TIMES DAILY
Qty: 60 TABLET | Refills: 11 | Status: SHIPPED | OUTPATIENT
Start: 2021-05-07 | End: 2021-08-26 | Stop reason: SDUPTHER

## 2021-05-07 ASSESSMENT — ENCOUNTER SYMPTOMS
CHILLS: 0
HEMOPTYSIS: 0
SORE THROAT: 0
STRIDOR: 0
WEAKNESS: 0
WHEEZING: 0
NEUROLOGICAL NEGATIVE: 1
SPUTUM PRODUCTION: 0
DIZZINESS: 0
LOSS OF CONSCIOUSNESS: 0
CONSTITUTIONAL NEGATIVE: 1
PALPITATIONS: 0
ORTHOPNEA: 0
GASTROINTESTINAL NEGATIVE: 1
BRUISES/BLEEDS EASILY: 0
EYES NEGATIVE: 1
FEVER: 0
PND: 0
MUSCULOSKELETAL NEGATIVE: 1
COUGH: 0
CLAUDICATION: 0
SHORTNESS OF BREATH: 1

## 2021-05-07 ASSESSMENT — FIBROSIS 4 INDEX: FIB4 SCORE: 1.8

## 2021-05-07 NOTE — PROGRESS NOTES
"Chief Complaint   Patient presents with   • Atrial Fibrillation   • HTN (Controlled)   • Coronary Artery Disease       Subjective:   Donte Magaña is a 79 y.o. female who presents today as a follow-up for her atrial fibrillation sick sinus syndrome status post pacemaker hypertension hyperlipidemia.  She had her pacemaker check today that showed 99% V pacing with no runs of atrial fibrillation.   Since he was last seen continues to have some shortness of breath.  Potassium is been slowly increasing the most recent 5.5.  Her blood pressures otherwise controlled.  She had her pacemaker checked today which was unremarkable.    Past Medical History:   Diagnosis Date   • A-fib (HCC)    • Anesthesia     \"Tachycardia for 5 days after cataract surgery\"   • Anticoagulant long-term use 1/12/2012   • Arthritis     osteo-Knees, hips   • Atrial fibrillation (HCC)    • Backpain     R hip   • Bowel habit changes     diarrhea   • Breath shortness     with exertion, prn O2 2L   • Bronchitis Nov, 2013   • CAD (coronary artery disease)    • Depression    • Glaucoma 5/3/2011   • Hematoma complicating a procedure 11/3/2012   • Hemorrhagic disorder ([facility])     bruising/coumadin   • Hypertension    • Hypothyroid    • Lupus (HCC)    • Macular degeneration    • Menopause 1/12/2012   • Mitral regurgitation 10/30/2012   • Obesity 1/12/2012   • Pacemaker 2018   • Pneumonia feb,2013   • Pre-syncope 6/29/2018   • Pulmonary hypertension (HCC) 10/30/2012   • PVC's (premature ventricular contractions) 1/12/2012   • Senile nuclear sclerosis    • Spinal stenosis of lumbar region at multiple levels    • Unspecified cataract     repaired bilateral   • Unspecified urinary incontinence    • Urinary bladder disorder      Past Surgical History:   Procedure Laterality Date   • PB COMBINED ANT/POST COLPORRHAPHY  1/14/2020    Procedure: COLPORRHAPHY, COMBINED ANTEROPOSTERIOR - PERINEOPLASTY;  Surgeon: Waqas Robin M.D.;  Location: SURGERY SAME DAY " [facility];  Service: Gynecology   • PB LAP,DIAGNOSTIC ABDOMEN  1/14/2020    Procedure: PELVISCOPY;  Surgeon: Waqas Robin M.D.;  Location: SURGERY SAME DAY [facility];  Service: Gynecology   • ENTEROCELE REPAIR  1/14/2020    Procedure: REPAIR, ENTEROCELE;  Surgeon: Waqas Robin M.D.;  Location: SURGERY SAME DAY [facility];  Service: Gynecology   • BLADDER SLING FEMALE  1/14/2020    Procedure: BLADDER SLING, FEMALE - TOT;  Surgeon: Waqas Robin M.D.;  Location: SURGERY SAME DAY [facility];  Service: Gynecology   • VAGINAL SUSPENSION  1/14/2020    Procedure: COLPOPEXY - SACROSPINOUS VAULT SUSPENSION;  Surgeon: Waqas Robin M.D.;  Location: SURGERY SAME DAY [facility];  Service: Gynecology   • SALPINGO OOPHORECTOMY Bilateral 1/14/2020    Procedure: SALPINGO-OOPHORECTOMY;  Surgeon: Waqas Robin M.D.;  Location: SURGERY SAME DAY [facility];  Service: Gynecology   • IRRIGATION & DEBRIDEMENT ORTHO Right 2/14/2019    Procedure: IRRIGATION & DEBRIDEMENT ORTHO-HIP WOUND ;  Surgeon: Vladimir Lee M.D.;  Location: [facility];  Service: Orthopedics   • HIP ARTH ANTERIOR TOTAL Right 1/17/2019    Procedure: HIP ARTHROPLASTY ANTERIOR TOTAL;  Surgeon: Juan C Mercedes M.D.;  Location: [facility];  Service: Orthopedics   • PACEMAKER INSERTION  06/30/2018    Dual Chamber   • KNEE ARTHROPLASTY TOTAL Right 6/23/2016    Procedure: KNEE ARTHROPLASTY TOTAL;  Surgeon: Heriberto Lozada M.D.;  Location: [facility];  Service:    • KNEE ARTHROPLASTY TOTAL Left 5/28/2015    Procedure: KNEE ARTHROPLASTY TOTAL;  Surgeon: Heriberto Lozada M.D.;  Location: [facility];  Service:    • CATARACT PHACO WITH IOL Right 5/5/2015    Procedure: IOL OD - STANDARD;  Surgeon: Dmitry Bejarano M.D.;  Location: [facility];  Service:    • CATARACT PHACO WITH IOL  4/21/2015    Performed by Dmitry Bejarano M.D. at [facility]   • RECOVERY  11/30/2010     Performed by SURGERY, CATH-RECOVERY at SURGERY SAME DAY [facility]S   • COLONOSCOPY  2008    Normal    [facility]   • ABDOMINAL HYSTERECTOMY TOTAL  April 15,1975    still has ovaries   • OPEN REDUCTION      left ankle   • OTHER      Removed pins from left ankle   • OTHER CARDIAC SURGERY  12/2017 and 07/19/2018     Cardiac Ablation   • TONSILLECTOMY AND ADENOIDECTOMY       Family History   Problem Relation Age of Onset   • Stroke Mother    • Diabetes Father    • Stroke Sister    • Heart Disease Brother    • Stroke Sister    • GI Disease Daughter         Crohn's Disease   • Heart Disease Daughter         CHF   • Other Daughter         Chronic Pain--Lymphedema   • Cancer Paternal Aunt         breast     Social History     Socioeconomic History   • Marital status:      Spouse name: Not on file   • Number of children: Not on file   • Years of education: Not on file   • Highest education level: Not on file   Occupational History   • Not on file   Tobacco Use   • Smoking status: Never Smoker   • Smokeless tobacco: Never Used   Substance and Sexual Activity   • Alcohol use: No   • Drug use: No   • Sexual activity: Not Currently     Partners: Male     Birth control/protection: Post-Menopausal   Other Topics Concern   • Not on file   Social History Narrative   • Not on file     Social Determinants of Health     Financial Resource Strain:    • Difficulty of Paying Living Expenses:    Food Insecurity:    • Worried About Running Out of Food in the Last Year:    • Ran Out of Food in the Last Year:    Transportation Needs:    • Lack of Transportation (Medical):    • Lack of Transportation (Non-Medical):    Physical Activity:    • Days of Exercise per Week:    • Minutes of Exercise per Session:    Stress:    • Feeling of Stress :    Social Connections:    • Frequency of Communication with Friends and Family:    • Frequency of Social Gatherings with Friends and Family:    • Attends [employer] Services:    • Active Member  "of Clubs or Organizations:    • Attends Club or Organization Meetings:    • Marital Status:    Intimate Partner Violence:    • Fear of Current or Ex-Partner:    • Emotionally Abused:    • Physically Abused:    • Sexually Abused:      Allergies   Allergen Reactions   • Amiodarone Hives     Throat and tongue itching   • Bactrim Shortness of Breath   • Cipro Xr Swelling   • Metoprolol Swelling     Causes throat swelling   • Morphine Unspecified     Hallucinations   • Phytoplex Z-Guard [Petrolatum-Zinc Oxide] Unspecified     \"causes burning\"   • Pseudoephedrine Palpitations   • Qvar [Beclomethasone Dipropionate] Unspecified     Pressure on heart     • Vibramycin Shortness of Breath   • Atorvastatin Calcium-Polysorbate 80 Unspecified     Muscle aches     • Augmentin Unspecified     Unknown reaction   • Diltiazem Rash     rash   • Flecainide Unspecified     dizziness   • Keflex Unspecified     Pt states \"Unsure\".   • Mucinex Unspecified     GI Distress     • Tramadol Unspecified     crying   • Atorvastatin Myalgia   • Tape Rash     Paper tape okay     Outpatient Encounter Medications as of 5/7/2021   Medication Sig Dispense Refill   • torsemide (DEMADEX) 20 MG Tab Take 1 tablet by mouth 2 times a day. 60 tablet 11   • Wound Dressings (TRIAD HYDROPHILIC WOUND DRESSI) Paste Apply 1 Application topically 2 Times a Day. 30 g 3   • magnesium oxide (MAG-OX) 400 MG Tab tablet Take 1 tablet by mouth every day. 40 tablet 1   • Triamcinolone Acetonide 0.025 % Lotion Apply 1 Squirt topically 2 Times a Day. Apply to itching ears. (Patient taking differently: Apply 1 Squirt topically 2 times a day as needed. Apply to itching ears.) 60 mL 0   • nystatin (MYCOSTATIN) powder Apply 1 g topically 2 Times a Day. 15 g 1   • warfarin (COUMADIN) 2.5 MG Tab TAKE ONE TO ONE AND ONE-HALF TABLETS BY MOUTH EVERY DAY AS DIRECTED BY THE [facility] 135 Tab 1   • sertraline (ZOLOFT) 50 MG Tab Take 1 Tab by mouth every day. 90 Tab 3 " "  • Mirabegron ER (MYRBETRIQ) 50 MG TABLET SR 24 HR Take 50 mg by mouth every day. 90 Tab 3   • ipratropium (ATROVENT) 0.03 % Solution Administer 2 Sprays into affected nostril(S) every 12 hours. 30 mL 2   • levothyroxine (SYNTHROID) 50 MCG Tab TAKE 1 TABLET BY MOUTH EVERY MORNING ON A EMPTY STOMACH 90 Tab 3   • estradiol (ESTRACE) 2 MG Tab Take 1 Tab by mouth every day. 90 Tab 2   • nitrofurantoin (MACROBID) 100 MG Cap Take 50 mg by mouth every day.     • lisinopril (PRINIVIL) 5 MG Tab Take 1 Tab by mouth every day. 100 Tab 3   • atenolol (TENORMIN) 50 MG Tab Take 1 Tab by mouth 2 times a day. 200 Tab 3   • Acetaminophen 500 MG Cap Take 2 Caps by mouth 3 times a day as needed. Indications: Pain     • PREBIOTIC PRODUCT PO Take 2 Tabs by mouth every day.     • Lutein 20 MG Cap Take 1 Cap by mouth every day. 90 Cap 3   • vitamin D (CHOLECALCIFEROL) 1000 UNIT TABS Take 2,000 Units by mouth every day.     • [DISCONTINUED] spironolactone (ALDACTONE) 50 MG Tab Take 1 Tab by mouth 2 times a day. 180 Tab 2     No facility-administered encounter medications on file as of 5/7/2021.     Review of Systems   Constitutional: Negative.  Negative for chills, fever and malaise/fatigue.   HENT: Negative.  Negative for sore throat.    Eyes: Negative.    Respiratory: Positive for shortness of breath. Negative for cough, hemoptysis, sputum production, wheezing and stridor.    Cardiovascular: Positive for leg swelling. Negative for chest pain, palpitations, orthopnea, claudication and PND.   Gastrointestinal: Negative.    Genitourinary: Negative.    Musculoskeletal: Negative.    Skin: Negative.    Neurological: Negative.  Negative for dizziness, loss of consciousness and weakness.   Endo/Heme/Allergies: Negative.  Does not bruise/bleed easily.   All other systems reviewed and are negative.     Objective:   /64 (BP Location: Right arm, Patient Position: Sitting, BP Cuff Size: Adult)   Pulse 83   Ht 1.626 m (5' 4\")   Wt 88.9 kg " (196 lb)   LMP 01/01/1993   SpO2 94%   BMI 33.64 kg/m²     Physical Exam   Constitutional: She appears well-developed and well-nourished. No distress.   HENT:   Head: Normocephalic and atraumatic.   Right Ear: External ear normal.   Left Ear: External ear normal.   Nose: Nose normal.   Mouth/Throat: No oropharyngeal exudate.   Eyes: Pupils are equal, round, and reactive to light. Conjunctivae and EOM are normal. Right eye exhibits no discharge. Left eye exhibits no discharge. No scleral icterus.   Neck: No JVD present.   Cardiovascular: Normal rate, regular rhythm and intact distal pulses. Exam reveals no gallop and no friction rub.   No murmur heard.  Pulmonary/Chest: Effort normal. No stridor. No respiratory distress. She has no wheezes. She has no rales. She exhibits no tenderness.   Abdominal: Soft. She exhibits no distension. There is no guarding.   Musculoskeletal:         General: No tenderness, deformity or edema. Normal range of motion.      Cervical back: Neck supple.   Neurological: She is alert. She has normal reflexes. She displays normal reflexes. No cranial nerve deficit. She exhibits normal muscle tone. Coordination normal.   Skin: Skin is warm and dry. No rash noted. She is not diaphoretic. No erythema. No pallor.   Psychiatric: She has a normal mood and affect. Her behavior is normal. Judgment and thought content normal.   Nursing note and vitals reviewed.    Assessment:     1. Coronary artery disease involving native coronary artery of native heart without angina pectoris  torsemide (DEMADEX) 20 MG Tab   2. Chronic anticoagulation  torsemide (DEMADEX) 20 MG Tab   3. Cardiac pacemaker in situ  torsemide (DEMADEX) 20 MG Tab    Basic Metabolic Panel   4. Essential hypertension  torsemide (DEMADEX) 20 MG Tab    Basic Metabolic Panel   5. High risk medication use  Basic Metabolic Panel   6. Persistent atrial fibrillation (HCC)  torsemide (DEMADEX) 20 MG Tab    Basic Metabolic Panel   7. Personal  history of systemic lupus erythematosus (SLE) ([facility])  torsemide (DEMADEX) 20 MG Tab    Basic Metabolic Panel   8. Pressure injury of buttock, stage 3, unspecified laterality ([facility])     9. PVD (peripheral vascular disease) ([facility])     10. Spinal stenosis, multilevel     11. Stage 3a chronic kidney disease         Medical Decision Making:  Today's Assessment / Status / Plan:     79-year-old female with sick sinus syndrome status post pacemaker doing well.  We will keep her on the atenolol for her atrial fibrillation.  For her increasing potassium I will stop the spironolactone.  She is on 100 mg total per day.  I will then switch her to torsemide 40 twice daily check labs in 1 week.  I would like to see her back and in 4 weeks to reassess for fluid accumulation and labs.

## 2021-05-10 NOTE — ED NOTES
Pt return from imaging, pain is intolerable after procedure. ERP notified with orders received.    Yes please check inr on admit, make sure she is still ok to check at home with daughter

## 2021-05-11 RX ORDER — MAGNESIUM OXIDE 400 MG/1
TABLET ORAL
Qty: 90 TABLET | Refills: 0 | Status: SHIPPED | OUTPATIENT
Start: 2021-05-11 | End: 2021-08-09

## 2021-05-11 NOTE — TELEPHONE ENCOUNTER
Requested Prescriptions     Signed Prescriptions Disp Refills   • magnesium oxide (MAG-OX) 400 MG Tab tablet 90 tablet 0     Sig: TAKE ONE TABLET BY MOUTH EVERY DAY     Authorizing Provider: ROSE HILARIO A.P.R.N.

## 2021-05-12 ENCOUNTER — OFFICE VISIT (OUTPATIENT)
Dept: WOUND CARE | Facility: MEDICAL CENTER | Age: 81
End: 2021-05-12
Attending: NURSE PRACTITIONER
Payer: MEDICARE

## 2021-05-12 VITALS
HEART RATE: 93 BPM | RESPIRATION RATE: 20 BRPM | TEMPERATURE: 97.6 F | DIASTOLIC BLOOD PRESSURE: 55 MMHG | OXYGEN SATURATION: 95 % | SYSTOLIC BLOOD PRESSURE: 108 MMHG

## 2021-05-12 DIAGNOSIS — R54 AGE-RELATED PHYSICAL DEBILITY: ICD-10-CM

## 2021-05-12 DIAGNOSIS — E66.09 OBESITY DUE TO EXCESS CALORIES, UNSPECIFIED CLASSIFICATION, UNSPECIFIED WHETHER SERIOUS COMORBIDITY PRESENT: ICD-10-CM

## 2021-05-12 DIAGNOSIS — L89.303 PRESSURE INJURY OF BUTTOCK, STAGE 3, UNSPECIFIED LATERALITY (HCC): Primary | ICD-10-CM

## 2021-05-12 PROCEDURE — 99213 OFFICE O/P EST LOW 20 MIN: CPT | Performed by: NURSE PRACTITIONER

## 2021-05-12 PROCEDURE — 99213 OFFICE O/P EST LOW 20 MIN: CPT

## 2021-05-12 ASSESSMENT — ENCOUNTER SYMPTOMS
CHILLS: 0
DEPRESSION: 0
SHORTNESS OF BREATH: 0
BACK PAIN: 1
DIARRHEA: 0
NERVOUS/ANXIOUS: 0
NAUSEA: 0
CLAUDICATION: 0
CONSTIPATION: 0
FEVER: 0
COUGH: 0
VOMITING: 0

## 2021-05-12 ASSESSMENT — PAIN SCALES - GENERAL: PAINLEVEL: NO PAIN

## 2021-05-12 NOTE — PROGRESS NOTES
Provider Encounter- Pressure Injury    HISTORY OF PRESENT ILLNESS                 START OF CARE IN CLINIC: 3/17/2021  REFERRING PROVIDER: VICTORIANO Montes  WOUND- Full Thickness Wound  LOCATION: Left medial buttock, right medial buttock              WOUND HISTORY: Patient is an 78 year old female who has had wounds to her buttocks off and on for years..  She was treated for these wounds previously in the clinic, discharged most recently at the end of December 2020 due to healing.  Her wounds have now recurred, she was referred back to [facility].  She is unsure of how the wounds started, but does state that she has a bad back and is in her recliner chair day and night.  She has a Roho cushion for offloading for her chair.  She does get up several times per day to go to the bathroom, but otherwise does not ambulate much.  She also has problems with frequent urinary incontinence, wears absorptive pads.  Her  brings her food, does all the cooking, cleaning and shopping.                       PERTINENT PMH: Obesity, limited mobility, chronic back pain, CAD, bilateral knee replacements, right hip replacement    Contributing factors: Immobility -yes.  Activity level: -Sedentary.  Support surfaces: -Waffle cushion to chair.  Incontinence: No.  Nutritional state: Well-nourished. Caregiver assistance: . Obesity: Yes. Moisture: Yes      DIABETES: No    TOBACCO USE: She has never smoked or used tobacco products      Interval History:  3/25/2021: Initial provider visit with PHYLLIS Holguin, VIVIAN-BC, CWJERRYN, CFKIMBER.  Donte is well-known to me from previous treatment in clinic.  She is accompanied today by her .  Wounds recurred about a month ago.  She is still spending most of her time in her recliner chair, with Roho cushion, ambulates only to the bathroom, incontinent of urine, wears adult briefs.  While her skin was still intact, she was applying A&E ointment.  Hydrocolloid applied in clinic last  visit.  She has difficulty changing her dressing,  able to help but he has multiple health issues of his own.  She is requesting referral to home health, prefers [facility] as this is the agency that sees her .    3/31/2021 : Clinic visit with PHYLLIS Holguin, MATILDE, MARITZA, JAMES.  Donte s doing well overall, denies fevers, chills, nausea, vomiting, cough or shortness of breath.  Buttock wounds continue to be a problem, complicated by incontinence and immobility.  Dressing applied to wounds last week in clinic fell off within the first 24 hours.    4/7/2021: Clinic visit with PHYLLIS Marie. Patient states that they are feeling well today.  Patient denies fever, chills, nausea, vomiting, lightheadedness, dizziness, shortness of breath and chest pain.  Patient still has 2 open ulcers to right and left buttocks.  Provided the patient with written instructions regarding proper offloading cushions to look at and purchase 1.  Patient instructed to purchase either a Roho cushion or a waffle cushion for offloading.  Patient also instructed she can use a pillow just every 30 to 45 minutes from side to side to offload.  Patient appears to have a slight amount of fungal component to her periwound area nystatin powder mixed with Triad cream applied to wound beds.    4/14/2021: Clinic visit with PHYLLIS Marie. Patient states that they are feeling well today.  Patient denies fever, chills, nausea, vomiting, lightheadedness, dizziness, shortness of breath and chest pain.  Bilateral buttocks wounds appear to be improving with epithelial tissue throughout both wound beds.  Patient to continue with cream composed of Triad, nystatin powder and zinc cream.  We will place a referral [facility] patient has been denied by [facility] and [facility] due to living in [facility].    4/21/2021 : Clinic visit with PHYLLIS Holguin, MATILDE, MARITZA, JAMES.  Donte presents today accompanied by her , who has  been taking care of her wounds.  She states she is feeling well overall, but frustrated with lack of healing of these wounds.   has been applying Triad paste several times per day, patient has Roho cushion for her chair.    4/28/2021 : Clinic visit with PHYLLIS Holguin, FNJARRELL-BC, CWOCN, CFCN.  Donte is again accompanied by her  today.  She states that the hydrocolloid's applied to her wounds last week had to be removed because she felt they kept her from having a BM.  Since then they have resumed applying Triad paste mixed with nystatin powder.  Patient has a Roho cushion for her chair, which to move to the car whenever she goes to appointments.        5/5/2021: Clinic visit with PHYLLIS Marie. Patient states that they are feeling well today.  Patient denies fever, chills, nausea, vomiting, lightheadedness, dizziness, shortness of breath and chest pain.  Patient's wounds are crusted over.  We have applied stoma powder and barrier wipe for crusting of the area prior to applying Triad cream.  Patient is still spending a significant amount of time on her bottom although she has purchased a Roho cushion which is beneficial.  Patient has been instructed on offloading strategies previously reiterated the strategies in clinic today.    5/12/2021: Clinic visit with PHYLLIS Marie. Patient states that they are feeling well today.  Patient denies fever, chills, nausea, vomiting, lightheadedness, dizziness, shortness of breath and chest pain.  Wounds have decreased in size.  We will continue to crest with ostomy powder and nystatin with skin barrier protectant wipes.  Patient to mix nystatin with barrier cream and apply after each incontinent episode.  Patient is to use a warm soft cloth to gently cleanse incontinent episodes however, patient has been on not to clean all the way down to the skin the level causing superficial removal of the epidermis.  Patient is to cleanse stool and urine off and  reapply paste layer.       REVIEW OF SYSTEMS:   Review of Systems   Constitutional: Negative for chills and fever.   Respiratory: Negative for cough and shortness of breath.         Shortness of breath with exertion   Cardiovascular: Positive for leg swelling. Negative for chest pain and claudication.   Gastrointestinal: Negative for constipation, diarrhea, nausea and vomiting.   Genitourinary:        Frequent incontinence of urine  Occasional incontinence of stool  Wears absorptive pad   Musculoskeletal: Positive for back pain and joint pain.        Chronic back and hip pain, debilitating   Skin:        Recurring buttock wounds for several years   Psychiatric/Behavioral: Negative for depression. The patient is not nervous/anxious.        PHYSICAL EXAMINATION:     Physical Exam  Constitutional:       Comments: Obese elderly female   HENT:      Head: Normocephalic.   Eyes:      Pupils: Pupils are equal, round, and reactive to light.   Pulmonary:      Effort: Pulmonary effort is normal.   Musculoskeletal:      Comments: Generalized dependent bilateral lower extremity edema  Uses walker for ambulation   Skin:     General: Skin is warm.      Comments: Ulcer to left buttocks  Ulcer to right buttocks  Refer to photos and flowsheet   Neurological:      Mental Status: She is alert and oriented to person, place, and time.   Psychiatric:         Mood and Affect: Mood and affect normal.         Cognition and Memory: Memory normal.         Judgment: Judgment normal.       Wound Assessment          Wound 03/17/21 Full Thickness Wound Buttocks Inner Right -Right Medial Buttock (Active)   Wound Image   05/12/21 1300   Site Assessment Dry 05/12/21 1300   Periwound Assessment Dry;Scar tissue;Callused 05/12/21 1300   Margins Attached edges 05/12/21 1300   Closure Secondary intention 04/21/21 1300   Drainage Amount KESHA 05/12/21 1300   Drainage Description Serosanguineous 04/21/21 1300   Treatments Cleansed;Topical Lidocaine;Provider  debridement 05/12/21 1300   Wound Cleansing Foam Cleanser/Washcloth 05/12/21 1300   Periwound Protectant Skin Protectant Wipes to Periwound;Stoma Powder 05/12/21 1300   Dressing Cleansing/Solutions Not Applicable 04/28/21 1345   Dressing Options Open to Air 05/12/21 1300   Dressing Changed New 04/28/21 1345   Dressing Status Clean;Dry;Intact 04/21/21 1300   Dressing Change/Treatment Frequency As Needed 05/12/21 1300   Non-staged Wound Description Full thickness 05/12/21 1300   Wound Length (cm) 3.5 cm 05/12/21 1300   Wound Width (cm) 1.5 cm 05/12/21 1300   Wound Depth (cm) 0.1 cm 05/12/21 1300   Wound Surface Area (cm^2) 5.25 cm^2 05/12/21 1300   Wound Volume (cm^3) 0.52 cm^3 05/12/21 1300   Post-Procedure Length (cm) 0.5 cm 04/28/21 1345   Post-Procedure Width (cm) 1.1 cm 04/28/21 1345   Post-Procedure Depth (cm) 0.1 cm 04/28/21 1345   Post-Procedure Surface Area (cm^2) 0.55 cm^2 04/28/21 1345   Post-Procedure Volume (cm^3) 0.06 cm^3 04/28/21 1345   Wound Healing % 77 05/12/21 1300   Wound Bed Granulation (%) 10 % 04/21/21 1300   Wound Bed Epithelium (%) 0 % 03/25/21 1305   Wound Bed Slough (%) 70 % 03/25/21 1305   Wound Bed Eschar (%) 90 % 04/21/21 1300   Wound Bed Granulation (%) - Post-Procedure 100 % 04/21/21 1300   Tunneling (cm) 0 cm 05/12/21 1300   Undermining (cm) 0 cm 05/12/21 1300   Wound Odor None 05/12/21 1300   Exposed Structures None 05/12/21 1300       Wound 03/25/21 Full Thickness Wound Buttocks Left -Left Medial Buttock cluster (Active)   Wound Image   05/12/21 1300   Site Assessment Dry 05/12/21 1300   Periwound Assessment Dry;Scar tissue;Callused 05/12/21 1300   Margins Undefined edges 05/12/21 1300   Closure Secondary intention 04/21/21 1300   Drainage Amount KESHA 05/12/21 1300   Drainage Description Serosanguineous 04/21/21 1300   Treatments Cleansed;Topical Lidocaine;Provider debridement 05/12/21 1300   Wound Cleansing Foam Cleanser/Washcloth 05/12/21 1300   Periwound Protectant Skin Protectant  Wipes to Periwound;Stoma Powder 05/12/21 1300   Dressing Cleansing/Solutions Not Applicable 04/28/21 1345   Dressing Options Open to Air 05/12/21 1300   Dressing Changed New 05/12/21 1300   Dressing Status Clean;Dry;Intact 04/21/21 1300   Dressing Change/Treatment Frequency As Needed 05/12/21 1300   Non-staged Wound Description Full thickness 05/12/21 1300   Wound Length (cm) 1 cm 05/12/21 1300   Wound Width (cm) 0.5 cm 05/12/21 1300   Wound Depth (cm) 0.1 cm 05/12/21 1300   Wound Surface Area (cm^2) 0.5 cm^2 05/12/21 1300   Wound Volume (cm^3) 0.05 cm^3 05/12/21 1300   Post-Procedure Length (cm) 0.9 cm 04/28/21 1345   Post-Procedure Width (cm) 0.6 cm 04/28/21 1345   Post-Procedure Depth (cm) 0.1 cm 04/28/21 1345   Post-Procedure Surface Area (cm^2) 0.54 cm^2 04/28/21 1345   Post-Procedure Volume (cm^3) 0.05 cm^3 04/28/21 1345   Wound Healing % 77 05/12/21 1300   Wound Bed Granulation (%) 20 % 04/21/21 1300   Wound Bed Eschar (%) 80 % 04/21/21 1300   Wound Bed Granulation (%) - Post-Procedure 100 % 04/21/21 1300   Tunneling (cm) 0 cm 05/12/21 1300   Undermining (cm) 0 cm 05/12/21 1300   Wound Odor None 05/12/21 1300   Exposed Structures None 05/12/21 1300          PROCEDURE:   Patient assessed in clinic today.  No excisional debridement required.    We did further crusting to the wound bed and periwound area with ostomy powder/nystatin powder with No Sting barrier film.          PATIENT EDUCATION  -Etiology of pressure injury  -Importance of offloading  -Strategies for offloading in bed and when seated discussed and demonstrated  - Importance of adequate nutrition for wound healing  -Increase protein intake (unless contraindicated by renal status)   - Advised to go to ER for any increased redness, swelling, drainage or odor, or if patient develops fever, chills, nausea or vomiting.    ASSESSMENT AND PLAN:     1. Pressure injury of buttock, stage 3, unspecified laterality (HCC)  Comments: Shallow stage III ulcers to  bilateral medial buttocks, mirroring each other, kissing wounds.      5/12/2021: Wounds crusted in clinic today.  Wounds are complicated by constant pressure, limited mobility, incontinence.  Patient has purchased a Roho cushion which should be of some benefit.  However, patient and  report today that they do not check the pressure of the cushion daily.  Patient instructed to check to the Roho cushion pressure daily.  Patient has been again instructed to offload with a pillow to reduce pressure alternating to the opposite side every hour.  -Wound bed crusted with stoma powder/nystatin powder and wound barrier wipes  -I offered to the patient and  to come every 2 weeks for assessment and possible debridement.  However they would like to continue to come weekly for more close watchful eye on the wound beds.  -Patient to apply barrier paste with nystatin powder mixed in after every incontinent episode  -Roho cushion on any surface patient is sitting on    Wound care: Crusting with stoma powder/nystatin powder and skin protectant/barrier, Triad cream    2. Age-related physical debility  Comments: Patient does not ambulate much, spends most of her day sitting due to physical weakness and chronic back pain.     5/12/2021: Patient is elderly with multiple medical issues.  She has been referred to 3 different home health agencies none of which were able to accommodate her at this time.  We will continue to try referrals in the future    3. Obesity due to excess calories, unspecified classification, unspecified whether serious comorbidity present  Comments: Contributing factor, patient is severely obese.  Weight loss likely very difficult patient given back pain issues and advanced age.      5/12/2021: Encourage patient to ambulate several times during the day.    > 20 min spent face to face with patient, >50% of time spent counseling, coordinating care, reviewing records, discussing POC, educating patient.   Time spent in above procedural time.      Please note that this dictation was created using voice recognition software. I have worked with technical experts from [facility] to optimize the interface.  I have made every reasonable attempt to correct obvious errors, but there may be errors of grammar and possibly content that I did not discover before finalizing the note.

## 2021-05-14 ENCOUNTER — HOSPITAL ENCOUNTER (OUTPATIENT)
Dept: LAB | Facility: MEDICAL CENTER | Age: 81
End: 2021-05-14
Attending: INTERNAL MEDICINE
Payer: MEDICARE

## 2021-05-14 DIAGNOSIS — Z79.899 HIGH RISK MEDICATION USE: ICD-10-CM

## 2021-05-14 DIAGNOSIS — I10 ESSENTIAL HYPERTENSION: ICD-10-CM

## 2021-05-14 DIAGNOSIS — I48.19 PERSISTENT ATRIAL FIBRILLATION (HCC): ICD-10-CM

## 2021-05-14 DIAGNOSIS — Z95.0 CARDIAC PACEMAKER IN SITU: ICD-10-CM

## 2021-05-14 DIAGNOSIS — M32.9 PERSONAL HISTORY OF SYSTEMIC LUPUS ERYTHEMATOSUS (SLE) (HCC): ICD-10-CM

## 2021-05-14 LAB
ANION GAP SERPL CALC-SCNC: 7 MMOL/L (ref 7–16)
BUN SERPL-MCNC: 27 MG/DL (ref 8–22)
CALCIUM SERPL-MCNC: 9.5 MG/DL (ref 8.5–10.5)
CHLORIDE SERPL-SCNC: 98 MMOL/L (ref 96–112)
CO2 SERPL-SCNC: 33 MMOL/L (ref 20–33)
CREAT SERPL-MCNC: 1.19 MG/DL (ref 0.5–1.4)
FASTING STATUS PATIENT QL REPORTED: NORMAL
GLUCOSE SERPL-MCNC: 105 MG/DL (ref 65–99)
POTASSIUM SERPL-SCNC: 4.4 MMOL/L (ref 3.6–5.5)
SODIUM SERPL-SCNC: 138 MMOL/L (ref 135–145)

## 2021-05-14 PROCEDURE — 36415 COLL VENOUS BLD VENIPUNCTURE: CPT

## 2021-05-14 PROCEDURE — 80048 BASIC METABOLIC PNL TOTAL CA: CPT

## 2021-05-18 ENCOUNTER — ANTICOAGULATION MONITORING (OUTPATIENT)
Dept: VASCULAR LAB | Facility: MEDICAL CENTER | Age: 81
End: 2021-05-18

## 2021-05-18 DIAGNOSIS — Z79.01 CHRONIC ANTICOAGULATION: ICD-10-CM

## 2021-05-18 LAB — INR PPP: 2.8 (ref 2–3.5)

## 2021-05-18 NOTE — PROGRESS NOTES
Anticoagulation Summary  As of 2021    INR goal:  2.0-3.0   TTR:  73.0 % (5.8 y)   INR used for dosin.80 (2021)   Warfarin maintenance plan:  3.75 mg (2.5 mg x 1.5) every Mon, Fri; 2.5 mg (2.5 mg x 1) all other days   Weekly warfarin total:  20 mg   Plan last modified:  Vladimir Taylor, PharmD (3/30/2021)   Next INR check:  2021   Target end date:  Indefinite    Indications    Atrial fibrillation (HCC) (Resolved) [I48.91]  Chronic anticoagulation [Z79.01]             Anticoagulation Episode Summary     INR check location:  Home Draw    Preferred lab:      Send INR reminders to:      Comments:  Win[facility] YEAGER      Anticoagulation Care Providers     Provider Role Specialty Phone number    Lizzy Haywood M.D. Referring Cardiology [phone]    [facility] Anticoagulation Services Responsible  [phone]    Vladimir Taylor, PharmD Responsible          Anticoagulation Patient Findings  Patient Findings     Positives:  Change in medications    Negatives:  Signs/symptoms of thrombosis, Signs/symptoms of bleeding, Laboratory test error suspected, Change in health, Change in alcohol use, Change in activity, Upcoming invasive procedure, Emergency department visit, Upcoming dental procedure, Missed doses, Extra doses, Change in diet/appetite, Hospital admission, Bruising, Other complaints              Spoke with patient today regarding therapeutic INR of 2.8.  Patient denies any signs/symptoms of bruising or bleeding or any changes in diet and medications.  Instructed patient to call clinic with any questions or concerns.    PT was taking torsemide for this past week but is no longer taking it.     Pt is to continue with current warfarin dosing regimen.    Follow up in 2 weeks, to reduce risk of adverse events related to this high risk medication,  Warfarin.    Jaiden Norton, Pharmacy Intern

## 2021-05-19 ENCOUNTER — OFFICE VISIT (OUTPATIENT)
Dept: WOUND CARE | Facility: MEDICAL CENTER | Age: 81
End: 2021-05-19
Attending: NURSE PRACTITIONER
Payer: MEDICARE

## 2021-05-19 DIAGNOSIS — L89.303 PRESSURE INJURY OF BUTTOCK, STAGE 3, UNSPECIFIED LATERALITY (HCC): ICD-10-CM

## 2021-05-19 PROCEDURE — 99212 OFFICE O/P EST SF 10 MIN: CPT

## 2021-05-19 PROCEDURE — 99211 OFF/OP EST MAY X REQ PHY/QHP: CPT

## 2021-05-19 PROCEDURE — 97597 DBRDMT OPN WND 1ST 20 CM/<: CPT

## 2021-05-20 NOTE — PROCEDURES
CSWD using Curette  to remove nonviable tissue from telma wound. Warm wash cloth to remove crusted areas to telma wound area.  Patient tolerated well.

## 2021-05-26 ENCOUNTER — APPOINTMENT (OUTPATIENT)
Dept: WOUND CARE | Facility: MEDICAL CENTER | Age: 81
End: 2021-05-26
Attending: NURSE PRACTITIONER
Payer: MEDICARE

## 2021-06-09 ENCOUNTER — ANTICOAGULATION MONITORING (OUTPATIENT)
Dept: VASCULAR LAB | Facility: MEDICAL CENTER | Age: 81
End: 2021-06-09

## 2021-06-09 DIAGNOSIS — Z79.01 CHRONIC ANTICOAGULATION: ICD-10-CM

## 2021-06-09 LAB — INR PPP: 2.4 (ref 2–3.5)

## 2021-06-09 NOTE — PROGRESS NOTES
Anticoagulation Summary  As of 2021    INR goal:  2.0-3.0   TTR:  73.3 % (5.9 y)   INR used for dosin.40 (2021)   Warfarin maintenance plan:  3.75 mg (2.5 mg x 1.5) every Mon, Fri; 2.5 mg (2.5 mg x 1) all other days   Weekly warfarin total:  20 mg   Plan last modified:  Vladimir Taylor, PharmD (3/30/2021)   Next INR check:  2021   Target end date:  Indefinite    Indications    Atrial fibrillation (HCC) (Resolved) [I48.91]  Chronic anticoagulation [Z79.01]             Anticoagulation Episode Summary     INR check location:  Home Draw    Preferred lab:      Send INR reminders to:      Comments:  [facility] YEAGER      Anticoagulation Care Providers     Provider Role Specialty Phone number    Lizzy Haywood M.D. Referring Cardiology [phone]    [facility] Anticoagulation Services Responsible  [phone]    Vladimir Taylor, PharmD Responsible          Anticoagulation Patient Findings      Spoke with patient  to report a therapeutic INR.      Pt denies any s/s of bleeding, bruising, clotting or any changes to diet or medication.    Pt not on antiplatelet therapy     Pt instructed to continue with current warfarin dosing regimen, confirms dosing.   Will follow up in 2 week(s).     Lexi Carpio, Pharmacy Intern

## 2021-06-17 ENCOUNTER — TELEPHONE (OUTPATIENT)
Dept: MEDICAL GROUP | Facility: PHYSICIAN GROUP | Age: 81
End: 2021-06-17

## 2021-06-17 DIAGNOSIS — I10 ESSENTIAL HYPERTENSION: ICD-10-CM

## 2021-06-17 DIAGNOSIS — L89.303 PRESSURE INJURY OF BUTTOCK, STAGE 3, UNSPECIFIED LATERALITY (HCC): ICD-10-CM

## 2021-06-17 DIAGNOSIS — Z95.0 CARDIAC PACEMAKER IN SITU: ICD-10-CM

## 2021-06-17 DIAGNOSIS — Z79.01 CHRONIC ANTICOAGULATION: ICD-10-CM

## 2021-06-17 DIAGNOSIS — E03.9 ACQUIRED HYPOTHYROIDISM: ICD-10-CM

## 2021-06-17 DIAGNOSIS — R60.0 BILATERAL LEG EDEMA: ICD-10-CM

## 2021-06-17 DIAGNOSIS — M48.00 SPINAL STENOSIS, MULTILEVEL: ICD-10-CM

## 2021-06-17 DIAGNOSIS — F32.0 CURRENT MILD EPISODE OF MAJOR DEPRESSIVE DISORDER WITHOUT PRIOR EPISODE (HCC): ICD-10-CM

## 2021-06-17 DIAGNOSIS — N18.31 STAGE 3A CHRONIC KIDNEY DISEASE: ICD-10-CM

## 2021-06-17 DIAGNOSIS — I73.9 PVD (PERIPHERAL VASCULAR DISEASE) (HCC): ICD-10-CM

## 2021-06-17 DIAGNOSIS — H35.30 MACULAR DEGENERATION OF BOTH EYES, UNSPECIFIED TYPE: ICD-10-CM

## 2021-06-17 DIAGNOSIS — I48.19 PERSISTENT ATRIAL FIBRILLATION (HCC): ICD-10-CM

## 2021-06-17 NOTE — TELEPHONE ENCOUNTER
1. Caller Name: Donte Magaña                        Call Back Number: [phone] (home) [phone] (work)      How would the patient prefer to be contacted with a response: Phone call do NOT leave a detailed message    2. SPECIFIC Action To Be Taken: Referral pending, please sign.    3. Diagnosis/Clinical Reason for Request: Need home health care since spoused passed on 06/01/2021    4. Specialty & Provider Name/Lab/Imaging Location: [facility] Requesting 2 X a week visits.    5. Is appointment scheduled for requested order/referral: no    Patient was informed they will receive a return phone call from the office ONLY if there are any questions before processing their request. Advised to call back if they haven't received a call from the referral department in 5 days.

## 2021-06-23 ENCOUNTER — TELEPHONE (OUTPATIENT)
Dept: MEDICAL GROUP | Facility: PHYSICIAN GROUP | Age: 81
End: 2021-06-23

## 2021-06-23 DIAGNOSIS — M48.00 SPINAL STENOSIS, MULTILEVEL: ICD-10-CM

## 2021-06-23 DIAGNOSIS — Z95.0 CARDIAC PACEMAKER IN SITU: ICD-10-CM

## 2021-06-23 DIAGNOSIS — E03.9 ACQUIRED HYPOTHYROIDISM: ICD-10-CM

## 2021-06-23 DIAGNOSIS — Z79.01 CHRONIC ANTICOAGULATION: ICD-10-CM

## 2021-06-23 DIAGNOSIS — I25.10 CORONARY ARTERY DISEASE INVOLVING NATIVE CORONARY ARTERY OF NATIVE HEART WITHOUT ANGINA PECTORIS: ICD-10-CM

## 2021-06-23 DIAGNOSIS — I10 ESSENTIAL HYPERTENSION: ICD-10-CM

## 2021-06-23 DIAGNOSIS — N18.31 STAGE 3A CHRONIC KIDNEY DISEASE: ICD-10-CM

## 2021-06-23 DIAGNOSIS — I48.19 PERSISTENT ATRIAL FIBRILLATION (HCC): ICD-10-CM

## 2021-06-23 DIAGNOSIS — H35.30 MACULAR DEGENERATION OF BOTH EYES, UNSPECIFIED TYPE: ICD-10-CM

## 2021-06-23 DIAGNOSIS — I73.9 PVD (PERIPHERAL VASCULAR DISEASE) (HCC): ICD-10-CM

## 2021-06-23 DIAGNOSIS — L89.303 PRESSURE INJURY OF BUTTOCK, STAGE 3, UNSPECIFIED LATERALITY (HCC): ICD-10-CM

## 2021-06-23 NOTE — TELEPHONE ENCOUNTER
Patient had requested a home health referral.  Her  had recently passed away.  Received message from referral department today that patient has been declined by all home health center area.  Will update patient.

## 2021-06-25 ENCOUNTER — TELEPHONE (OUTPATIENT)
Dept: MEDICAL GROUP | Facility: PHYSICIAN GROUP | Age: 81
End: 2021-06-25

## 2021-06-25 ENCOUNTER — ANTICOAGULATION MONITORING (OUTPATIENT)
Dept: MEDICAL GROUP | Facility: PHYSICIAN GROUP | Age: 81
End: 2021-06-25

## 2021-06-25 DIAGNOSIS — L89.303 PRESSURE INJURY OF BUTTOCK, STAGE 3, UNSPECIFIED LATERALITY (HCC): ICD-10-CM

## 2021-06-25 DIAGNOSIS — Z79.01 CHRONIC ANTICOAGULATION: ICD-10-CM

## 2021-06-25 LAB — INR PPP: 3.5 (ref 2–3.5)

## 2021-06-25 NOTE — PROGRESS NOTES
Anticoagulation Summary  As of 6/25/2021    INR goal:  2.0-3.0   TTR:  73.2 % (5.9 y)   INR used for dosing:  3.50 (6/25/2021)   Warfarin maintenance plan:  2.5 mg (2.5 mg x 1) every day   Weekly warfarin total:  17.5 mg   Plan last modified:  Vladimir Taylor PharmD (6/25/2021)   Next INR check:  7/2/2021   Target end date:  Indefinite    Indications    Atrial fibrillation (HCC) (Resolved) [I48.91]  Chronic anticoagulation [Z79.01]             Anticoagulation Episode Summary     INR check location:  Home Draw    Preferred lab:      Send INR reminders to:      Comments:  Win[facility]       Anticoagulation Care Providers     Provider Role Specialty Phone number    Lizzy Haywood M.D. Referring Cardiology [phone]    [facility] Responsible  [phone]    Peter HollowayD Responsible          Anticoagulation Patient Findings  Patient Findings     Positives:  Change in diet/appetite    Negatives:  Signs/symptoms of thrombosis, Signs/symptoms of bleeding, Laboratory test error suspected, Change in health, Change in alcohol use, Change in activity, Upcoming invasive procedure, Emergency department visit, Upcoming dental procedure, Missed doses, Extra doses, Change in medications, Hospital admission, Bruising, Other complaints        Spoke with patient today regarding supratherapeutic INR of 3.5.  Patient denies any signs/symptoms of bruising or bleeding or any changes in medications.  Instructed patient to call clinic with any questions or concerns.  Patient's  passed away earlier in the month, therefore, dietary habits have been altered.  Instructed patient to decrease weekly warfarin regimen as detailed above.  Follow up in 1 weeks, to reduce risk of adverse events related to this high risk medication,  Warfarin.    Vladimir Taylor, Tim, BCACP

## 2021-06-25 NOTE — TELEPHONE ENCOUNTER
VOICEMAIL  1. Caller Name: Donte                      Call Back Number: [phone] (home)    2. Message: Pt is requesting referral to wound clinic.    3. Patient approves office to leave a detailed voicemail/MyChart message: N\A

## 2021-07-06 ENCOUNTER — OFFICE VISIT (OUTPATIENT)
Dept: WOUND CARE | Facility: MEDICAL CENTER | Age: 81
End: 2021-07-06
Attending: NURSE PRACTITIONER
Payer: MEDICARE

## 2021-07-06 VITALS
HEART RATE: 100 BPM | SYSTOLIC BLOOD PRESSURE: 135 MMHG | RESPIRATION RATE: 17 BRPM | TEMPERATURE: 97.8 F | DIASTOLIC BLOOD PRESSURE: 72 MMHG | OXYGEN SATURATION: 93 %

## 2021-07-06 DIAGNOSIS — R32 INCONTINENCE ASSOCIATED DERMATITIS: ICD-10-CM

## 2021-07-06 DIAGNOSIS — L89.303 PRESSURE INJURY OF BUTTOCK, STAGE 3, UNSPECIFIED LATERALITY (HCC): ICD-10-CM

## 2021-07-06 DIAGNOSIS — R54 AGE-RELATED PHYSICAL DEBILITY: ICD-10-CM

## 2021-07-06 DIAGNOSIS — L25.8 INCONTINENCE ASSOCIATED DERMATITIS: ICD-10-CM

## 2021-07-06 DIAGNOSIS — E66.09 OBESITY DUE TO EXCESS CALORIES, UNSPECIFIED CLASSIFICATION, UNSPECIFIED WHETHER SERIOUS COMORBIDITY PRESENT: ICD-10-CM

## 2021-07-06 LAB — INR PPP: 2 (ref 2–3.5)

## 2021-07-06 PROCEDURE — 11042 DBRDMT SUBQ TIS 1ST 20SQCM/<: CPT | Performed by: NURSE PRACTITIONER

## 2021-07-06 PROCEDURE — 99214 OFFICE O/P EST MOD 30 MIN: CPT | Mod: 25 | Performed by: NURSE PRACTITIONER

## 2021-07-06 PROCEDURE — 11042 DBRDMT SUBQ TIS 1ST 20SQCM/<: CPT

## 2021-07-06 PROCEDURE — 99214 OFFICE O/P EST MOD 30 MIN: CPT

## 2021-07-06 RX ORDER — HYDROPHILIC CREAM
1 PASTE (GRAM) TOPICAL 2 TIMES DAILY
Qty: 30 G | Refills: 3 | Status: SHIPPED | OUTPATIENT
Start: 2021-07-06 | End: 2022-01-31

## 2021-07-06 ASSESSMENT — ENCOUNTER SYMPTOMS
FEVER: 0
COUGH: 0
ABDOMINAL PAIN: 0
CHILLS: 0
HEADACHES: 0
BACK PAIN: 1
NAUSEA: 0
MYALGIAS: 0
VOMITING: 0
DIZZINESS: 0
SHORTNESS OF BREATH: 0

## 2021-07-06 ASSESSMENT — PAIN SCALES - GENERAL: PAINLEVEL: NO PAIN

## 2021-07-06 NOTE — PROGRESS NOTES
Provider Encounter- Pressure Injury      HISTORY OF PRESENT ILLNESS  Wound History:    START OF CARE IN CLINIC: 7/6/21    REFERRING PROVIDER: Rosi FERGUSON (PCP)     WOUND- Full Thickness Wound   LOCATION: Left medial buttock, right medial buttock              WOUND HISTORY: Patient is an 78 year old female who has had wounds to her buttocks off and on for years.  She was treated for these wounds previously in the clinic, 5/12/21.  Her wounds have now recurred, she was referred back to [facility].  She is unsure of how the wounds started, but does state that she has a bad back and is in her recliner chair day and night.  She has a Roho cushion for offloading for her chair.  She does get up several times per day to go to the bathroom, but otherwise does not ambulate much.  She also has problems with frequent urinary incontinence, wears absorptive pads.  Her  recently passed away.  She has been referred to Home Health, but has been told that no one services her area in [place].                               PERTINENT PMH: Obesity, limited mobility, chronic back pain, CAD, bilateral knee replacements, right hip replacement     Contributing factors: Immobility -yes.  Activity level: -Sedentary.  Support surfaces: -Waffle cushion to chair.  Incontinence: No.  Nutritional state: Well-nourished. Caregiver assistance: . Obesity: Yes. Moisture: Yes        DIABETES: No     TOBACCO USE: She has never smoked or used tobacco products    Patient's problem list, allergies, and current medications reviewed and updated in Epic    Interval History:  7/6/2021: Initial provider clinic visit with PHYLLIS Hernandez, [facility].  Patient accompanied by her daughter.  Patient stating that her daughter comes over every other day to help bring her food otherwise she is able to bath and prepare meals on her own.  He has been referred to home health but unfortunately has been denied by multiple agency stating that they do not serve  her area.  Patient reports feeling well. Denies wound drainage, odor. Denies erythema, swelling, pain.  She is mostly in her recliner during the day and night stating she cannot lay flat due to chronic back pain.  She does have a Roho cushion that she uses and does try to offload is much as possible.    REVIEW OF SYSTEMS:   Review of Systems   Constitutional: Negative for chills and fever.   Respiratory: Negative for cough and shortness of breath.    Cardiovascular: Negative for chest pain and leg swelling.   Gastrointestinal: Negative for abdominal pain, nausea and vomiting.   Musculoskeletal: Positive for back pain. Negative for myalgias.   Skin: Negative for itching and rash.   Neurological: Negative for dizziness and headaches.       PHYSICAL EXAMINATION:   /72   Pulse 100   Temp 36.6 °C (97.8 °F)   Resp 17   LMP 01/01/1993   SpO2 93%     Physical Exam  Constitutional:       Appearance: Normal appearance. She is obese.   HENT:      Head: Normocephalic and atraumatic.   Eyes:      Extraocular Movements: Extraocular movements intact.      Pupils: Pupils are equal, round, and reactive to light.   Cardiovascular:      Rate and Rhythm: Normal rate.   Pulmonary:      Effort: Pulmonary effort is normal. No respiratory distress.   Musculoskeletal:      Cervical back: Normal range of motion.      Comments: Limited ROM due to physical debility   Skin:     General: Skin is warm and dry.   Neurological:      General: No focal deficit present.      Mental Status: She is alert.   Psychiatric:         Mood and Affect: Mood normal.         Behavior: Behavior normal.         WOUND ASSESSMENT       Wound 03/17/21 Full Thickness Wound Buttocks Inner Right -Right Medial Buttock (Active)   Wound Image    07/06/21 1330   Site Assessment Red;Yellow 07/06/21 1330   Periwound Assessment Dry;Scar tissue;Callused 07/06/21 1330   Margins Attached edges 07/06/21 1330   Closure Secondary intention 04/21/21 1300   Drainage Amount  KESHA 07/06/21 1330   Drainage Description Serosanguineous 04/21/21 1300   Treatments Cleansed;Topical Lidocaine;Provider debridement 07/06/21 1330   Wound Cleansing Foam Cleanser/Washcloth 07/06/21 1330   Periwound Protectant Barrier Paste 07/06/21 1330   Dressing Cleansing/Solutions Not Applicable 04/28/21 1345   Dressing Options Open to Air 07/06/21 1330   Dressing Changed Changed 07/06/21 1330   Dressing Status Clean;Dry;Intact 04/21/21 1300   Dressing Change/Treatment Frequency As Needed 07/06/21 1330   Non-staged Wound Description Full thickness 07/06/21 1330   Wound Length (cm) 0.6 cm 07/06/21 1330   Wound Width (cm) 0.5 cm 07/06/21 1330   Wound Depth (cm) 0.2 cm 07/06/21 1330   Wound Surface Area (cm^2) 0.3 cm^2 07/06/21 1330   Wound Volume (cm^3) 0.06 cm^3 07/06/21 1330   Post-Procedure Length (cm) 0.7 cm 07/06/21 1330   Post-Procedure Width (cm) 0.5 cm 07/06/21 1330   Post-Procedure Depth (cm) 0.5 cm 07/06/21 1330   Post-Procedure Surface Area (cm^2) 0.35 cm^2 07/06/21 1330   Post-Procedure Volume (cm^3) 0.18 cm^3 07/06/21 1330   Wound Healing % 97 07/06/21 1330   Wound Bed Granulation (%) 10 % 04/21/21 1300   Wound Bed Epithelium (%) 0 % 03/25/21 1305   Wound Bed Slough (%) 70 % 03/25/21 1305   Wound Bed Eschar (%) 90 % 04/21/21 1300   Wound Bed Granulation (%) - Post-Procedure 100 % 04/21/21 1300   Tunneling (cm) 0 cm 07/06/21 1330   Undermining (cm) 0 cm 07/06/21 1330   Wound Odor None 07/06/21 1330   Exposed Structures None 07/06/21 1330       Wound 03/25/21 Full Thickness Wound Buttocks Left -Left Medial Buttock cluster (Active)   Wound Image    07/06/21 1330   Site Assessment Pink;Yellow 07/06/21 1330   Periwound Assessment Dry;Scar tissue;Callused 07/06/21 1330   Margins Attached edges 07/06/21 1330   Closure Secondary intention 04/21/21 1300   Drainage Amount KESHA 07/06/21 1330   Drainage Description Serosanguineous 04/21/21 1300   Treatments Cleansed;Topical Lidocaine;Provider debridement 07/06/21  1330   Wound Cleansing Foam Cleanser/Washcloth 07/06/21 1330   Periwound Protectant Barrier Paste 07/06/21 1330   Dressing Cleansing/Solutions Not Applicable 04/28/21 1345   Dressing Options Open to Air 07/06/21 1330   Dressing Changed Changed 07/06/21 1330   Dressing Status Clean;Dry;Intact 04/21/21 1300   Dressing Change/Treatment Frequency Every 48 hrs, and As Needed 07/06/21 1330   Non-staged Wound Description Full thickness 07/06/21 1330   Wound Length (cm) 1.7 cm 07/06/21 1330   Wound Width (cm) 0.4 cm 07/06/21 1330   Wound Depth (cm) 0.1 cm 05/12/21 1300   Wound Surface Area (cm^2) 0.68 cm^2 07/06/21 1330   Wound Volume (cm^3) 0.05 cm^3 05/12/21 1300   Post-Procedure Length (cm) 1.7 cm 07/06/21 1330   Post-Procedure Width (cm) 0.5 cm 07/06/21 1330   Post-Procedure Depth (cm) 0.1 cm 07/06/21 1330   Post-Procedure Surface Area (cm^2) 0.85 cm^2 07/06/21 1330   Post-Procedure Volume (cm^3) 0.08 cm^3 07/06/21 1330   Wound Healing % 77 05/12/21 1300   Wound Bed Granulation (%) 20 % 04/21/21 1300   Wound Bed Eschar (%) 80 % 04/21/21 1300   Wound Bed Granulation (%) - Post-Procedure 100 % 04/21/21 1300   Tunneling (cm) 0 cm 07/06/21 1330   Undermining (cm) 0 cm 07/06/21 1330   Wound Odor None 07/06/21 1330   Exposed Structures None 07/06/21 1330            PROCEDURE:   -2% viscous lidocaine applied topically to wound bed for approximately 5 minutes prior to debridement  -Curette used to debride wound bed and periwound callus.  Excisional debridement was performed to remove devitalized tissue until healthy, bleeding tissue was visualized.   Entire surface of wound, 1.2 cm2 debrided.  Tissue debrided into the subcutaneous layer.    -Bleeding controlled with manual pressure.    -Wound care completed by wound RN, refer to flowsheet  -Patient tolerated the procedure well, without c/o pain or discomfort.       Pertinent Labs and Diagnostics:    Labs:     A1c:   Lab Results   Component Value Date/Time    HBA1C 5.3  02/13/2019 07:57 PM               ASSESSMENT AND PLAN:     1. Pressure injury of buttock, stage 3, unspecified laterality (HCC)  Recurrent issue  Excisional debridement of wound in clinic today, medically necessary to promote wound healing.    Wound care: Triad cream, open to air    2. Age-related physical debility  Complicating factor.  Referred to home health    3. Obesity due to excess calories, unspecified classification, unspecified whether serious comorbidity present  Complicating factor    4. Incontinence associated dermatitis  Complicating factor.  Triad cream refilled    PATIENT EDUCATION  -Etiology of pressure injury  -Importance of offloading and frequent repositioning  -Strategies for offloading in bed and when seated discussed and demonstrated  - Importance of adequate nutrition for wound healing  - Advised to go to ER for any increased redness, swelling, drainage or odor, or if patient develops fever, chills, nausea or vomiting.    My total time spent caring for the patient on the day of the encounter was 30 minutes.   This does not include time spent on separately billable procedures/tests.    Please note that this note may have been created using voice recognition software. I have worked with technical experts from [employer] to optimize the interface.  I have made every reasonable attempt to correct obvious errors, but there may be errors of grammar and possibly content that I did not discover before finalizing the note.    N

## 2021-07-06 NOTE — PATIENT INSTRUCTIONS
-Keep your wound dressing clean, dry, and intact.    -Change your dressing if it becomes soiled, soaked, or falls off.    -Should you experience any significant changes in your wound(s), such as infection (redness, swelling, localized heat, increased pain, fever > 101 F, chills) or have any questions regarding your home care instructions, please contact the wound center at [phone]. If after hours, contact your primary care physician or go to the hospital emergency room.

## 2021-07-07 ENCOUNTER — ANTICOAGULATION MONITORING (OUTPATIENT)
Dept: VASCULAR LAB | Facility: MEDICAL CENTER | Age: 81
End: 2021-07-07

## 2021-07-07 ENCOUNTER — HOME HEALTH ADMISSION (OUTPATIENT)
Dept: HOME HEALTH SERVICES | Facility: HOME HEALTHCARE | Age: 81
End: 2021-07-07
Payer: MEDICARE

## 2021-07-07 DIAGNOSIS — Z79.01 CHRONIC ANTICOAGULATION: ICD-10-CM

## 2021-07-07 NOTE — PROGRESS NOTES
Anticoagulation Summary  As of 2021    INR goal:  2.0-3.0   TTR:  73.1 % (6 y)   INR used for dosin.00 (2021)   Warfarin maintenance plan:  2.5 mg (2.5 mg x 1) every day   Weekly warfarin total:  17.5 mg   Plan last modified:  Vladimir Taylor, PharmD (2021)   Next INR check:  2021   Target end date:  Indefinite    Indications    Atrial fibrillation (HCC) (Resolved) [I48.91]  Chronic anticoagulation [Z79.01]             Anticoagulation Episode Summary     INR check location:  Home Draw    Preferred lab:      Send INR reminders to:      Comments:  [place]       Anticoagulation Care Providers     Provider Role Specialty Phone number    Lizzy Haywood M.D. Referring Cardiology [phone]    [facility] Responsible  [phone]    Vladimir Taylor, PharmD Responsible          Anticoagulation Patient Findings  Patient Findings     Positives:  Change in diet/appetite (hasnt been eating well since husbands death in )    Negatives:  Signs/symptoms of thrombosis, Signs/symptoms of bleeding, Laboratory test error suspected, Change in health, Change in alcohol use, Change in activity, Upcoming invasive procedure, Emergency department visit, Upcoming dental procedure, Missed doses, Extra doses, Change in medications, Hospital admission, Bruising, Other complaints           Spoke with patient today regarding therapeutic INR of 2.00.  Patient denies any signs/symptoms of bruising or bleeding or any changes and medications.      Patient reports that her  passed away in  so she hasnt been eating well.    Instructed patient to call clinic with any questions or concerns.    Pt is to continue with current warfarin dosing regimen.    Follow up in 2 weeks, to reduce risk of adverse events related to this high risk medication,  Warfarin.    Allegra Toussaint, Pharmacy Intern

## 2021-07-13 ENCOUNTER — OFFICE VISIT (OUTPATIENT)
Dept: WOUND CARE | Facility: MEDICAL CENTER | Age: 81
End: 2021-07-13
Attending: NURSE PRACTITIONER
Payer: MEDICARE

## 2021-07-13 VITALS
DIASTOLIC BLOOD PRESSURE: 67 MMHG | HEART RATE: 95 BPM | OXYGEN SATURATION: 98 % | RESPIRATION RATE: 18 BRPM | TEMPERATURE: 97 F | SYSTOLIC BLOOD PRESSURE: 132 MMHG

## 2021-07-13 DIAGNOSIS — L25.8 INCONTINENCE ASSOCIATED DERMATITIS: ICD-10-CM

## 2021-07-13 DIAGNOSIS — E66.09 OBESITY DUE TO EXCESS CALORIES, UNSPECIFIED CLASSIFICATION, UNSPECIFIED WHETHER SERIOUS COMORBIDITY PRESENT: ICD-10-CM

## 2021-07-13 DIAGNOSIS — L89.303 PRESSURE INJURY OF BUTTOCK, STAGE 3, UNSPECIFIED LATERALITY (HCC): ICD-10-CM

## 2021-07-13 DIAGNOSIS — R54 AGE-RELATED PHYSICAL DEBILITY: ICD-10-CM

## 2021-07-13 DIAGNOSIS — R32 INCONTINENCE ASSOCIATED DERMATITIS: ICD-10-CM

## 2021-07-13 PROCEDURE — 11042 DBRDMT SUBQ TIS 1ST 20SQCM/<: CPT

## 2021-07-13 PROCEDURE — 99213 OFFICE O/P EST LOW 20 MIN: CPT | Mod: 25

## 2021-07-13 PROCEDURE — 11042 DBRDMT SUBQ TIS 1ST 20SQCM/<: CPT | Performed by: NURSE PRACTITIONER

## 2021-07-13 ASSESSMENT — ENCOUNTER SYMPTOMS
VOMITING: 0
FEVER: 0
ABDOMINAL PAIN: 0
DIZZINESS: 0
BACK PAIN: 1
HEADACHES: 0
COUGH: 0
SHORTNESS OF BREATH: 0
MYALGIAS: 0
CHILLS: 0
NAUSEA: 0

## 2021-07-13 NOTE — PATIENT INSTRUCTIONS
-Keep your wound dressing clean, dry, and intact.    -Change your dressing if it becomes soiled, soaked, or falls off.    -Wound vac may not have any drainage in tube or cannister & it will still be -Should you experience any significant changes in your wound(s), such as infection (redness, swelling, localized heat, increased pain, fever > 101 F, chills) or have any questions regarding your home care instructions, please contact the wound center at [phone]. If after hours, contact your primary care physician or go to the hospital emergency room.

## 2021-07-13 NOTE — PROGRESS NOTES
Provider Encounter- Pressure Injury      HISTORY OF PRESENT ILLNESS  Wound History:    START OF CARE IN CLINIC: 7/6/21    REFERRING PROVIDER: Rosi FERGUSON (PCP)     WOUND- Full Thickness Wound   LOCATION: Left medial buttock, right medial buttock              WOUND HISTORY: Patient is an 78 year old female who has had wounds to her buttocks off and on for years.  She was treated for these wounds previously in the clinic, 5/12/21.  Her wounds have now recurred, she was referred back to [facility].  She is unsure of how the wounds started, but does state that she has a bad back and is in her recliner chair day and night.  She has a Roho cushion for offloading for her chair.  She does get up several times per day to go to the bathroom, but otherwise does not ambulate much.  She also has problems with frequent urinary incontinence, wears absorptive pads.  Her  recently passed away.  She has been referred to Home Health, but has been told that no one services her area in [place].                               PERTINENT PMH: Obesity, limited mobility, chronic back pain, CAD, bilateral knee replacements, right hip replacement     Contributing factors: Immobility -yes.  Activity level: -Sedentary.  Support surfaces: -Waffle cushion to chair.  Incontinence: No.  Nutritional state: Well-nourished. Caregiver assistance: . Obesity: Yes. Moisture: Yes        DIABETES: No     TOBACCO USE: She has never smoked or used tobacco products    Patient's problem list, allergies, and current medications reviewed and updated in Epic    Interval History:  7/6/2021: Initial provider clinic visit with PHYLLIS Hernandez, [facility].  Patient accompanied by her daughter.  Patient stating that her daughter comes over every other day to help bring her food otherwise she is able to bath and prepare meals on her own.  He has been referred to home health but unfortunately has been denied by multiple agency stating that they do not serve  "her area.  Patient reports feeling well. Denies wound drainage, odor. Denies erythema, swelling, pain.  She is mostly in her recliner during the day and night stating she cannot lay flat due to chronic back pain.  She does have a Roho cushion that she uses and does try to offload is much as possible.    7/13/21: Clinic visit with PHYLLIS Hernandez FNP-[facility].  Patient accompanied by her daughter. Patients daughter stating that [facility] will be starting with them, but that no appointment has been scheduled.  It has been a struggle to get her home health services based on her location and it not being \"within the service area\" for multiple agencies.  Patient reports feeling well. Denies wound drainage, odor. Denies erythema, swelling, pain.  She is mostly in her recliner during the day and night stating she cannot lay flat due to chronic back pain.  She does have a Roho cushion that she uses and does try to offload is much as possible.    REVIEW OF SYSTEMS:   Review of Systems   Constitutional: Negative for chills and fever.   Respiratory: Negative for cough and shortness of breath.    Cardiovascular: Negative for chest pain and leg swelling.   Gastrointestinal: Negative for abdominal pain, nausea and vomiting.   Musculoskeletal: Positive for back pain. Negative for myalgias.   Skin: Negative for itching and rash.   Neurological: Negative for dizziness and headaches.       PHYSICAL EXAMINATION:   /67 (BP Location: Left arm, Patient Position: Sitting)   Pulse 95   Temp 36.1 °C (97 °F) (Temporal)   Resp 18   LMP 01/01/1993   SpO2 98%     Physical Exam  Constitutional:       Appearance: Normal appearance. She is obese.   HENT:      Head: Normocephalic and atraumatic.   Eyes:      Extraocular Movements: Extraocular movements intact.      Pupils: Pupils are equal, round, and reactive to light.   Cardiovascular:      Rate and Rhythm: Normal rate.   Pulmonary:      Effort: Pulmonary effort is normal. No " respiratory distress.   Musculoskeletal:      Cervical back: Normal range of motion.      Comments: Limited ROM due to physical debility   Skin:     General: Skin is warm and dry.   Neurological:      General: No focal deficit present.      Mental Status: She is alert.   Psychiatric:         Mood and Affect: Mood normal.         Behavior: Behavior normal.         WOUND ASSESSMENT          Wound 03/17/21 Full Thickness Wound Buttocks Inner Right -Right Medial Buttock (Active)   Wound Image    07/13/21 1400   Site Assessment Red;Yellow 07/13/21 1400   Periwound Assessment Dry;Fragile;Scar tissue 07/13/21 1400   Margins Attached edges 07/13/21 1400   Closure Secondary intention 04/21/21 1300   Drainage Amount KESHA 07/13/21 1400   Drainage Description Serosanguineous 04/21/21 1300   Treatments Cleansed;Topical Lidocaine;Provider debridement 07/13/21 1400   Wound Cleansing Foam Cleanser/Washcloth 07/13/21 1400   Periwound Protectant Barrier Paste 07/13/21 1400   Dressing Cleansing/Solutions Not Applicable 04/28/21 1345   Dressing Options Dry Gauze 07/13/21 1400   Dressing Changed Changed 07/13/21 1400   Dressing Status Clean;Dry;Intact 07/13/21 1400   Dressing Change/Treatment Frequency Every 48 hrs, and As Needed 07/13/21 1400   Non-staged Wound Description Full thickness 07/13/21 1400   Wound Length (cm) 0.7 cm 07/13/21 1400   Wound Width (cm) 0.7 cm 07/13/21 1400   Wound Depth (cm) 0.3 cm 07/13/21 1400   Wound Surface Area (cm^2) 0.49 cm^2 07/13/21 1400   Wound Volume (cm^3) 0.15 cm^3 07/13/21 1400   Post-Procedure Length (cm) 0.7 cm 07/13/21 1400   Post-Procedure Width (cm) 0.7 cm 07/13/21 1400   Post-Procedure Depth (cm) 0.3 cm 07/13/21 1400   Post-Procedure Surface Area (cm^2) 0.49 cm^2 07/13/21 1400   Post-Procedure Volume (cm^3) 0.15 cm^3 07/13/21 1400   Wound Healing % 93 07/13/21 1400   Wound Bed Granulation (%) 10 % 04/21/21 1300   Wound Bed Epithelium (%) 0 % 03/25/21 1305   Wound Bed Slough (%) 70 % 03/25/21  1305   Wound Bed Eschar (%) 90 % 04/21/21 1300   Wound Bed Granulation (%) - Post-Procedure 100 % 04/21/21 1300   Tunneling (cm) 0 cm 07/13/21 1400   Undermining (cm) 0 cm 07/13/21 1400   Wound Odor None 07/13/21 1400   Exposed Structures None 07/13/21 1400       Wound 03/25/21 Full Thickness Wound Buttocks Left -Left Medial Buttock cluster (Active)   Wound Image    07/13/21 1400   Site Assessment Pink;Yellow 07/13/21 1400   Periwound Assessment Dry;Scar tissue;Fragile 07/13/21 1400   Margins Attached edges 07/13/21 1400   Closure Secondary intention 04/21/21 1300   Drainage Amount KESHA 07/13/21 1400   Drainage Description Serosanguineous 04/21/21 1300   Treatments Cleansed;Topical Lidocaine;Provider debridement 07/13/21 1400   Wound Cleansing Foam Cleanser/Washcloth 07/13/21 1400   Periwound Protectant Barrier Paste 07/13/21 1400   Dressing Cleansing/Solutions Not Applicable 04/28/21 1345   Dressing Options Dry Gauze 07/13/21 1400   Dressing Changed Changed 07/13/21 1400   Dressing Status Clean;Dry;Intact 07/13/21 1400   Dressing Change/Treatment Frequency Every 48 hrs, and As Needed 07/13/21 1400   Non-staged Wound Description Full thickness 07/13/21 1400   Wound Length (cm) 1.9 cm 07/13/21 1400   Wound Width (cm) 0.7 cm 07/13/21 1400   Wound Depth (cm) 0.1 cm 07/13/21 1400   Wound Surface Area (cm^2) 1.33 cm^2 07/13/21 1400   Wound Volume (cm^3) 0.13 cm^3 07/13/21 1400   Post-Procedure Length (cm) 1.9 cm 07/13/21 1400   Post-Procedure Width (cm) 0.8 cm 07/13/21 1400   Post-Procedure Depth (cm) 0.1 cm 07/13/21 1400   Post-Procedure Surface Area (cm^2) 1.52 cm^2 07/13/21 1400   Post-Procedure Volume (cm^3) 0.15 cm^3 07/13/21 1400   Wound Healing % 41 07/13/21 1400   Wound Bed Granulation (%) 20 % 04/21/21 1300   Wound Bed Eschar (%) 80 % 04/21/21 1300   Wound Bed Granulation (%) - Post-Procedure 100 % 04/21/21 1300   Tunneling (cm) 0 cm 07/13/21 1400   Undermining (cm) 0 cm 07/13/21 1400   Wound Odor None 07/13/21  1400   Exposed Structures None 07/13/21 1400              PROCEDURE:   -2% viscous lidocaine applied topically to wound bed for approximately 5 minutes prior to debridement  -Curette used to debride wound bed and periwound callus.  Excisional debridement was performed to remove devitalized tissue until healthy, bleeding tissue was visualized.   Entire surface of wound, 2.01 cm2 debrided.  Tissue debrided into the subcutaneous layer.    -Bleeding controlled with manual pressure.    -Wound care completed by wound RN, refer to flowsheet  -Patient tolerated the procedure well, without c/o pain or discomfort.       Pertinent Labs and Diagnostics:    Labs:     A1c:   Lab Results   Component Value Date/Time    HBA1C 5.3 02/13/2019 07:57 PM               ASSESSMENT AND PLAN:     1. Pressure injury of buttock, stage 3, unspecified laterality (HCC)  Recurrent issue  Excisional debridement of wound in clinic today, medically necessary to promote wound healing.  Wound basically unchanged  Establishing with [facility]    Wound care: Triad cream, gauze tucked to help with shearing    2. Age-related physical debility  Complicating factor.  Referred to home health    3. Obesity due to excess calories, unspecified classification, unspecified whether serious comorbidity present  Complicating factor    4. Incontinence associated dermatitis  Complicating factor.  Discussed changing pad as soon as it is soiled    PATIENT EDUCATION  -Etiology of pressure injury  -Importance of offloading and frequent repositioning  -Strategies for offloading in bed and when seated discussed and demonstrated  - Importance of adequate nutrition for wound healing  - Advised to go to ER for any increased redness, swelling, drainage or odor, or if patient develops fever, chills, nausea or vomiting.    My total time spent caring for the patient on the day of the encounter was 25 minutes.   This does not include time spent on separately billable  procedures/tests.    Please note that this note may have been created using voice recognition software. I have worked with technical experts from [facility] to optimize the interface.  I have made every reasonable attempt to correct obvious errors, but there may be errors of grammar and possibly content that I did not discover before finalizing the note.    N

## 2021-07-20 ENCOUNTER — OFFICE VISIT (OUTPATIENT)
Dept: WOUND CARE | Facility: MEDICAL CENTER | Age: 81
End: 2021-07-20
Attending: NURSE PRACTITIONER
Payer: MEDICARE

## 2021-07-20 DIAGNOSIS — L25.8 INCONTINENCE ASSOCIATED DERMATITIS: ICD-10-CM

## 2021-07-20 DIAGNOSIS — R54 AGE-RELATED PHYSICAL DEBILITY: ICD-10-CM

## 2021-07-20 DIAGNOSIS — L89.303 PRESSURE INJURY OF BUTTOCK, STAGE 3, UNSPECIFIED LATERALITY (HCC): ICD-10-CM

## 2021-07-20 DIAGNOSIS — E66.09 OBESITY DUE TO EXCESS CALORIES, UNSPECIFIED CLASSIFICATION, UNSPECIFIED WHETHER SERIOUS COMORBIDITY PRESENT: ICD-10-CM

## 2021-07-20 DIAGNOSIS — R32 INCONTINENCE ASSOCIATED DERMATITIS: ICD-10-CM

## 2021-07-23 ENCOUNTER — ANTICOAGULATION MONITORING (OUTPATIENT)
Dept: VASCULAR LAB | Facility: MEDICAL CENTER | Age: 81
End: 2021-07-23

## 2021-07-23 DIAGNOSIS — Z79.01 CHRONIC ANTICOAGULATION: ICD-10-CM

## 2021-07-23 LAB — INR PPP: 2.1 (ref 2–3.5)

## 2021-07-23 NOTE — PROGRESS NOTES
Anticoagulation Summary  As of 2021    INR goal:  2.0-3.0   TTR:  73.3 % (6 y)   INR used for dosin.10 (2021)   Warfarin maintenance plan:  2.5 mg (2.5 mg x 1) every day   Weekly warfarin total:  17.5 mg   Plan last modified:  Vladimir Taylor, PharmD (2021)   Next INR check:  2021   Target end date:  Indefinite    Indications    Atrial fibrillation (HCC) (Resolved) [I48.91]  Chronic anticoagulation [Z79.01]             Anticoagulation Episode Summary     INR check location:  Home Draw    Preferred lab:      Send INR reminders to:      Comments:  [place]       Anticoagulation Care Providers     Provider Role Specialty Phone number    Lizzy Haywood M.D. Referring Cardiology [phone]    [facility] Responsible  [phone]    Vladimir Tyalor, PharmD Responsible          Anticoagulation Patient Findings  Patient Findings     Negatives:  Signs/symptoms of thrombosis, Signs/symptoms of bleeding, Laboratory test error suspected, Change in health, Change in alcohol use, Change in activity, Upcoming invasive procedure, Emergency department visit, Upcoming dental procedure, Missed doses, Extra doses, Change in medications, Change in diet/appetite, Hospital admission, Bruising, Other complaints        Spoke with patient today regarding therapeutic INR of 2.1.  Patient denies any signs/symptoms of bruising or bleeding or any changes in diet and medications.  Instructed patient to call clinic with any questions or concerns.    Pt is to continue with current warfarin dosing regimen.    Follow up in 2 weeks, to reduce risk of adverse events related to this high risk medication,  Warfarin.    Allegra Toussaint, Pharmacy Intern

## 2021-07-27 ENCOUNTER — HOSPITAL ENCOUNTER (OUTPATIENT)
Dept: LAB | Facility: MEDICAL CENTER | Age: 81
End: 2021-07-27
Attending: NURSE PRACTITIONER
Payer: MEDICARE

## 2021-07-27 ENCOUNTER — OFFICE VISIT (OUTPATIENT)
Dept: WOUND CARE | Facility: MEDICAL CENTER | Age: 81
End: 2021-07-27
Attending: NURSE PRACTITIONER
Payer: MEDICARE

## 2021-07-27 VITALS
OXYGEN SATURATION: 95 % | SYSTOLIC BLOOD PRESSURE: 117 MMHG | DIASTOLIC BLOOD PRESSURE: 72 MMHG | TEMPERATURE: 96.8 F | RESPIRATION RATE: 18 BRPM | HEART RATE: 104 BPM

## 2021-07-27 DIAGNOSIS — L25.8 INCONTINENCE ASSOCIATED DERMATITIS: ICD-10-CM

## 2021-07-27 DIAGNOSIS — E66.09 OBESITY DUE TO EXCESS CALORIES, UNSPECIFIED CLASSIFICATION, UNSPECIFIED WHETHER SERIOUS COMORBIDITY PRESENT: ICD-10-CM

## 2021-07-27 DIAGNOSIS — L89.303 PRESSURE INJURY OF BUTTOCK, STAGE 3, UNSPECIFIED LATERALITY (HCC): ICD-10-CM

## 2021-07-27 DIAGNOSIS — R32 INCONTINENCE ASSOCIATED DERMATITIS: ICD-10-CM

## 2021-07-27 DIAGNOSIS — R54 AGE-RELATED PHYSICAL DEBILITY: ICD-10-CM

## 2021-07-27 DIAGNOSIS — Z79.01 CHRONIC ANTICOAGULATION: ICD-10-CM

## 2021-07-27 LAB
INR PPP: 1.93 (ref 0.87–1.13)
PROTHROMBIN TIME: 21.5 SEC (ref 12–14.6)

## 2021-07-27 PROCEDURE — 11042 DBRDMT SUBQ TIS 1ST 20SQCM/<: CPT | Performed by: NURSE PRACTITIONER

## 2021-07-27 PROCEDURE — 99213 OFFICE O/P EST LOW 20 MIN: CPT | Mod: 25 | Performed by: NURSE PRACTITIONER

## 2021-07-27 PROCEDURE — 36415 COLL VENOUS BLD VENIPUNCTURE: CPT

## 2021-07-27 PROCEDURE — 85610 PROTHROMBIN TIME: CPT

## 2021-07-27 PROCEDURE — 11042 DBRDMT SUBQ TIS 1ST 20SQCM/<: CPT

## 2021-07-27 PROCEDURE — 99213 OFFICE O/P EST LOW 20 MIN: CPT

## 2021-07-27 ASSESSMENT — ENCOUNTER SYMPTOMS
ABDOMINAL PAIN: 0
SHORTNESS OF BREATH: 0
FEVER: 0
BACK PAIN: 1
MYALGIAS: 0
CHILLS: 0
DIZZINESS: 0
COUGH: 0
NAUSEA: 0
HEADACHES: 0
VOMITING: 0

## 2021-07-27 NOTE — PROGRESS NOTES
Provider Encounter- Pressure Injury      HISTORY OF PRESENT ILLNESS  Wound History:    START OF CARE IN CLINIC: 7/6/21    REFERRING PROVIDER: Rosi FERGUSON (PCP)     WOUND- Full Thickness Wound   LOCATION: Left medial buttock, right medial buttock              WOUND HISTORY: Patient is an 78 year old female who has had wounds to her buttocks off and on for years.  She was treated for these wounds previously in the clinic, 5/12/21.  Her wounds have now recurred, she was referred back to [facility].  She is unsure of how the wounds started, but does state that she has a bad back and is in her recliner chair day and night.  She has a Roho cushion for offloading for her chair.  She does get up several times per day to go to the bathroom, but otherwise does not ambulate much.  She also has problems with frequent urinary incontinence, wears absorptive pads.  Her  recently passed away.  She has been referred to Home Health, but has been told that no one services her area in [place].                               PERTINENT PMH: Obesity, limited mobility, chronic back pain, CAD, bilateral knee replacements, right hip replacement     Contributing factors: Immobility -yes.  Activity level: -Sedentary.  Support surfaces: -Waffle cushion to chair.  Incontinence: No.  Nutritional state: Well-nourished. Caregiver assistance: . Obesity: Yes. Moisture: Yes        DIABETES: No     TOBACCO USE: She has never smoked or used tobacco products    Patient's problem list, allergies, and current medications reviewed and updated in Epic    Interval History:  7/6/2021: Initial provider clinic visit with HPYLLIS Hernandez, [facility].  Patient accompanied by her daughter.  Patient stating that her daughter comes over every other day to help bring her food otherwise she is able to bath and prepare meals on her own.  He has been referred to home health but unfortunately has been denied by multiple agency stating that they do not serve  "her area.  Patient reports feeling well. Denies wound drainage, odor. Denies erythema, swelling, pain.  She is mostly in her recliner during the day and night stating she cannot lay flat due to chronic back pain.  She does have a Roho cushion that she uses and does try to offload is much as possible.    7/13/21: Clinic visit with PHYLLIS Hernandez, [facility].  Patient accompanied by her daughter. Patients daughter stating that [facility] will be starting with them, but that no appointment has been scheduled.  It has been a struggle to get her home health services based on her location and it not being \"within the service area\" for multiple agencies.  Patient reports feeling well. Denies wound drainage, odor. Denies erythema, swelling, pain.  She is mostly in her recliner during the day and night stating she cannot lay flat due to chronic back pain.  She does have a Roho cushion that she uses and does try to offload is much as possible.    7/27/2021 : Clinic visit with PHYLLIS Holguin, [facility], CWOCN, CFCN.   [place] states she is feeling well overall.  She is still not heard from home health.  I checked status of referral.  She has been denied by [facility], and [facility] due to living in [facility].  She continues to apply Triad barrier paste several times per day.  She is sitting on a Roho cushion during the day.        REVIEW OF SYSTEMS:   Review of Systems   Constitutional: Negative for chills and fever.   Respiratory: Negative for cough and shortness of breath.    Cardiovascular: Negative for chest pain and leg swelling.   Gastrointestinal: Negative for abdominal pain, nausea and vomiting.   Musculoskeletal: Positive for back pain. Negative for myalgias.   Skin: Negative for itching and rash.   Neurological: Negative for dizziness and headaches.       PHYSICAL EXAMINATION:   /72 (BP Location: Left arm, Patient Position: Sitting)   Pulse (!) 104 Comment: rn notified  Temp 36 °C (96.8 °F) " (Temporal)   Resp 18   LMP 01/01/1993   SpO2 95%     Physical Exam  Constitutional:       Appearance: Normal appearance. She is obese.   HENT:      Head: Normocephalic and atraumatic.   Eyes:      Extraocular Movements: Extraocular movements intact.      Pupils: Pupils are equal, round, and reactive to light.   Cardiovascular:      Rate and Rhythm: Normal rate.   Pulmonary:      Effort: Pulmonary effort is normal. No respiratory distress.   Musculoskeletal:      Cervical back: Normal range of motion.      Comments: Limited ROM due to physical debility   Skin:     General: Skin is warm and dry.   Neurological:      General: No focal deficit present.      Mental Status: She is alert.   Psychiatric:         Mood and Affect: Mood normal.         Behavior: Behavior normal.         WOUND ASSESSMENT    Wound 03/17/21 Full Thickness Wound Buttocks Inner Right -Right Medial Buttock (Active)   Wound Image    07/27/21 1445   Site Assessment Pink;Yellow 07/27/21 1445   Periwound Assessment Dry;Fragile;Scar tissue 07/27/21 1445   Margins Attached edges 07/27/21 1445   Closure Secondary intention 04/21/21 1300   Drainage Amount KESHA 07/27/21 1445   Drainage Description KESHA 07/27/21 1445   Treatments Cleansed;Topical Lidocaine;Provider debridement 07/27/21 1445   Wound Cleansing Normal Saline Irrigation 07/27/21 1445   Periwound Protectant Skin Moisturizer;Barrier Paste 07/27/21 1445   Dressing Cleansing/Solutions Not Applicable 07/27/21 1445   Dressing Options Other (Comments);Dry Gauze 07/27/21 1445   Dressing Changed Changed 07/27/21 1445   Dressing Status Clean;Dry;Intact 07/13/21 1400   Dressing Change/Treatment Frequency As Needed 07/27/21 1445   Non-staged Wound Description Full thickness 07/27/21 1445   Wound Length (cm) 1.1 cm 07/27/21 1445   Wound Width (cm) 1 cm 07/27/21 1445   Wound Depth (cm) 0.3 cm 07/27/21 1445   Wound Surface Area (cm^2) 1.1 cm^2 07/27/21 1445   Wound Volume (cm^3) 0.33 cm^3 07/27/21 1445    Post-Procedure Length (cm) 1.4 cm 07/27/21 1445   Post-Procedure Width (cm) 1 cm 07/27/21 1445   Post-Procedure Depth (cm) 0.3 cm 07/27/21 1445   Post-Procedure Surface Area (cm^2) 1.4 cm^2 07/27/21 1445   Post-Procedure Volume (cm^3) 0.42 cm^3 07/27/21 1445   Wound Healing % 85 07/27/21 1445   Wound Bed Granulation (%) 10 % 04/21/21 1300   Wound Bed Epithelium (%) 0 % 03/25/21 1305   Wound Bed Slough (%) 70 % 03/25/21 1305   Wound Bed Eschar (%) 90 % 04/21/21 1300   Wound Bed Granulation (%) - Post-Procedure 100 % 04/21/21 1300   Tunneling (cm) 0 cm 07/27/21 1445   Undermining (cm) 0 cm 07/27/21 1445   Wound Odor None 07/27/21 1445   Exposed Structures None 07/27/21 1445            PROCEDURE: Excisional debridement of right buttock wound  -2% viscous lidocaine applied topically to wound bed for approximately 5 minutes prior to debridement  -Curette used to debride wound bed and periwound callus.  Excisional debridement was performed to remove devitalized tissue until healthy, bleeding tissue was visualized.   Entire surface of wound, 1.4 cm2 debrided.  Tissue debrided into the subcutaneous layer.    -Bleeding controlled with manual pressure.    -Wound care completed by wound RN, refer to flowsheet  -Patient tolerated the procedure well, without c/o pain or discomfort.       Pertinent Labs and Diagnostics:    Labs:     A1c:   Lab Results   Component Value Date/Time    HBA1C 5.3 02/13/2019 07:57 PM               ASSESSMENT AND PLAN:     1. Pressure injury of buttock, stage 3, unspecified laterality (HCC)  Recurrent issue    7/27/2021: Left buttock wound is healed today.  Right buttock wound persists.  According to referral message, she has been turned down for home health services by multiple agencies.  Will confirm with [facility], as patient states she was told by staff member at [facility] that they go out to [facility].  -Excisional debridement of wound in clinic today, medically necessary to promote wound  healing.  -Patient to return to clinic weekly for assessment and debridement  -Patient to continue to apply Triad paste at least daily to affected areas.  -Patient to try to minimize sitting, ambulate more often.    Wound care: Zinc paste, gauze tucked to help with shearing    2. Age-related physical debility  Complicating factor.  Referred to home health    3. Obesity due to excess calories, unspecified classification, unspecified whether serious comorbidity present  Complicating factor    4. Incontinence associated dermatitis  Complicating factor.  Discussed changing pad as soon as it is soiled    PATIENT EDUCATION  -Etiology of pressure injury  -Importance of offloading and frequent repositioning  -Strategies for offloading in bed and when seated discussed and demonstrated  - Importance of adequate nutrition for wound healing  - Advised to go to ER for any increased redness, swelling, drainage or odor, or if patient develops fever, chills, nausea or vomiting.    My total time spent caring for the patient on the day of the encounter was 25 minutes.   This does not include time spent on separately billable procedures/tests.    Please note that this note may have been created using voice recognition software. I have worked with technical experts from [facility] [facility] to optimize the interface.  I have made every reasonable attempt to correct obvious errors, but there may be errors of grammar and possibly content that I did not discover before finalizing the note.    N

## 2021-07-27 NOTE — PATIENT INSTRUCTIONS
-Apply Triad cream to your buttocks daily and after every time you use the restroom.    -Should you experience any significant changes in your wound, such as signs of infection (redness, swelling, localized heat, increased pain, fever > 101 F, chills) or have any questions regarding your home care instructions, please contact the wound center at [phone]. If after hours, contact your primary care physician or go to the hospital emergency room.     -If you are 5 or more minutes late for an appointment, we reserve the right to cancel and reschedule that appointment. Additionally, if you are habitually late or not showing (3 late cancellations and/or no shows), we reserve the right to cancel your remaining appointments and it will be your responsibility to obtain a new referral if services are still needed.

## 2021-07-28 ENCOUNTER — ANTICOAGULATION MONITORING (OUTPATIENT)
Dept: VASCULAR LAB | Facility: MEDICAL CENTER | Age: 81
End: 2021-07-28

## 2021-07-28 DIAGNOSIS — Z79.01 CHRONIC ANTICOAGULATION: ICD-10-CM

## 2021-07-28 NOTE — PROGRESS NOTES
Anticoagulation Summary  As of 2021    INR goal:  2.0-3.0   TTR:  73.3 % (6 y)   INR used for dosin.93 (2021)   Warfarin maintenance plan:  2.5 mg (2.5 mg x 1) every day   Weekly warfarin total:  17.5 mg   Plan last modified:  Vladimir Taylor PharmD (2021)   Next INR check:  2021   Target end date:  Indefinite    Indications    Atrial fibrillation (HCC) (Resolved) [I48.91]  Chronic anticoagulation [Z79.01]             Anticoagulation Episode Summary     INR check location:  Home Draw    Preferred lab:      Send INR reminders to:      Comments:  [place]       Anticoagulation Care Providers     Provider Role Specialty Phone number    Lizzy Haywood M.D. Referring Cardiology [phone]    [facility] Responsible  [phone]    Vladimir Taylor PharmD Responsible          Anticoagulation Patient Findings  Patient Findings     Negatives:  Signs/symptoms of thrombosis, Signs/symptoms of bleeding, Laboratory test error suspected, Change in health, Change in alcohol use, Change in activity, Upcoming invasive procedure, Emergency department visit, Upcoming dental procedure, Missed doses, Extra doses, Change in medications, Change in diet/appetite, Hospital admission, Bruising, Other complaints           Spoke with patient today regarding subtherapeutic INR of 1.93.  Patient denies any signs/symptoms of bruising or bleeding or any changes in diet and medications.  Instructed patient to call clinic with any questions or concerns.    Pt is to bolus x 1 then continue with current warfarin dosing regimen.    Follow up in 1 week, to reduce risk of adverse events related to this high risk medication,  Warfarin.    Allegra Toussaint, Pharmacy Intern     Discussed with Vladimir Taylor, PeterD       Refill approved as requested.

## 2021-08-02 RX ORDER — ESTRADIOL 2 MG/1
TABLET ORAL
Qty: 90 TABLET | Refills: 0 | Status: SHIPPED | OUTPATIENT
Start: 2021-08-02 | End: 2021-11-18 | Stop reason: SDUPTHER

## 2021-08-02 RX ORDER — SPIRONOLACTONE 50 MG/1
TABLET, FILM COATED ORAL
Qty: 180 TABLET | Refills: 0 | OUTPATIENT
Start: 2021-08-02

## 2021-08-02 NOTE — TELEPHONE ENCOUNTER
Requested Prescriptions     Signed Prescriptions Disp Refills   • estradiol (ESTRACE) 2 MG Tab 90 tablet 0     Sig: TAKE ONE TABLET BY MOUTH ONCE DAILY     Authorizing Provider: NORBERT PARADA     Refused Prescriptions Disp Refills   • spironolactone (ALDACTONE) 50 MG Tab [Pharmacy Med Name: SPIRONOLACT 50 MG   TAB NORT] 180 tablet 0     Sig: TAKE ONE TABLET BY MOUTH TWICE DAILY     Refused By: NORBERT PARADA     Reason for Refusal: Refill not appropriate.     VICTORIANO Montes

## 2021-08-03 ENCOUNTER — OFFICE VISIT (OUTPATIENT)
Dept: WOUND CARE | Facility: MEDICAL CENTER | Age: 81
End: 2021-08-03
Attending: NURSE PRACTITIONER
Payer: MEDICARE

## 2021-08-03 VITALS
OXYGEN SATURATION: 94 % | DIASTOLIC BLOOD PRESSURE: 61 MMHG | HEART RATE: 104 BPM | RESPIRATION RATE: 17 BRPM | SYSTOLIC BLOOD PRESSURE: 109 MMHG | TEMPERATURE: 97.4 F

## 2021-08-03 DIAGNOSIS — E66.09 OBESITY DUE TO EXCESS CALORIES, UNSPECIFIED CLASSIFICATION, UNSPECIFIED WHETHER SERIOUS COMORBIDITY PRESENT: ICD-10-CM

## 2021-08-03 DIAGNOSIS — R32 INCONTINENCE ASSOCIATED DERMATITIS: ICD-10-CM

## 2021-08-03 DIAGNOSIS — L89.303 PRESSURE INJURY OF BUTTOCK, STAGE 3, UNSPECIFIED LATERALITY (HCC): ICD-10-CM

## 2021-08-03 DIAGNOSIS — R54 AGE-RELATED PHYSICAL DEBILITY: ICD-10-CM

## 2021-08-03 DIAGNOSIS — L25.8 INCONTINENCE ASSOCIATED DERMATITIS: ICD-10-CM

## 2021-08-03 PROCEDURE — 99213 OFFICE O/P EST LOW 20 MIN: CPT

## 2021-08-03 PROCEDURE — 99213 OFFICE O/P EST LOW 20 MIN: CPT | Performed by: NURSE PRACTITIONER

## 2021-08-03 ASSESSMENT — ENCOUNTER SYMPTOMS
COUGH: 0
FEVER: 0
SHORTNESS OF BREATH: 0
CHILLS: 0
NAUSEA: 0
VOMITING: 0
MYALGIAS: 0
BACK PAIN: 1
ABDOMINAL PAIN: 0
HEADACHES: 0
DIZZINESS: 0

## 2021-08-03 NOTE — PROGRESS NOTES
[facility] Provider Encounter- Pressure Injury      HISTORY OF PRESENT ILLNESS  Wound History:    START OF CARE IN CLINIC: 7/6/21    REFERRING PROVIDER: Rosi FERGUSON (PCP)     WOUND- Full Thickness Wound   LOCATION: Left medial buttock, right medial buttock              WOUND HISTORY: Patient is an 78 year old female who has had wounds to her buttocks off and on for years.  She was treated for these wounds previously in the clinic, 5/12/21.  Her wounds have now recurred, she was referred back to [facility].  She is unsure of how the wounds started, but does state that she has a bad back and is in her recliner chair day and night.  She has a Roho cushion for offloading for her chair.  She does get up several times per day to go to the bathroom, but otherwise does not ambulate much.  She also has problems with frequent urinary incontinence, wears absorptive pads.  Her  recently passed away.  She has been referred to Home Health, but has been told that no one services her area in [place].                               PERTINENT PMH: Obesity, limited mobility, chronic back pain, CAD, bilateral knee replacements, right hip replacement     Contributing factors: Immobility -yes.  Activity level: -Sedentary.  Support surfaces: -Waffle cushion to chair.  Incontinence: No.  Nutritional state: Well-nourished. Caregiver assistance: . Obesity: Yes. Moisture: Yes        DIABETES: No     TOBACCO USE: She has never smoked or used tobacco products    Patient's problem list, allergies, and current medications reviewed and updated in Epic    Interval History:  7/6/2021: Initial provider clinic visit with PHYLLIS Hernandez, [facility].  Patient accompanied by her daughter.  Patient stating that her daughter comes over every other day to help bring her food otherwise she is able to bath and prepare meals on her own.  He has been referred to home health but unfortunately has been denied by multiple agency stating that they do not  "serve her area.  Patient reports feeling well. Denies wound drainage, odor. Denies erythema, swelling, pain.  She is mostly in her recliner during the day and night stating she cannot lay flat due to chronic back pain.  She does have a Roho cushion that she uses and does try to offload is much as possible.    7/13/21: Clinic visit with PHYLLIS Hernandez FNP-BC.  Patient accompanied by her daughter. Patients daughter stating that [facility] will be starting with them, but that no appointment has been scheduled.  It has been a struggle to get her home health services based on her location and it not being \"within the service area\" for multiple agencies.  Patient reports feeling well. Denies wound drainage, odor. Denies erythema, swelling, pain.  She is mostly in her recliner during the day and night stating she cannot lay flat due to chronic back pain.  She does have a Roho cushion that she uses and does try to offload is much as possible.    7/27/2021 : Clinic visit with PHYLLIS Holguin, MATILDE, MARITZA, JAMES.   [place] states she is feeling well overall.  She is still not heard from home health.  I checked status of referral.  She has been denied by [facility], and [place] due to living in [facility].  She continues to apply Triad barrier paste several times per day.  She is sitting on a Roho cushion during the day.    8/3/2021 : Clinic visit with PHYLLIS Holguin FNP-BC, MARITZA, JAMES.  Don[place] states she is feeling well.  Wondering about status of home health referral.  I reminded her of our conversation last week.  We did confirm that [facility] does not go to her area.  Her wounds are essentially resolved today anyway.  Still a bit of crusting.  Informed Donte that these wounds are likely the come and go given their location and her poor mobility.  Advised her to continue to apply barrier paste several times per day, and to minimize sitting.  She is discharged to [facility].        REVIEW OF SYSTEMS: "   Review of Systems   Constitutional: Negative for chills and fever.   Respiratory: Negative for cough and shortness of breath.    Cardiovascular: Negative for chest pain and leg swelling.   Gastrointestinal: Negative for abdominal pain, nausea and vomiting.   Musculoskeletal: Positive for back pain. Negative for myalgias.   Skin: Negative for itching and rash.   Neurological: Negative for dizziness and headaches.       PHYSICAL EXAMINATION:   /61 (BP Location: Right arm, Patient Position: Sitting, BP Cuff Size: Adult)   Pulse (!) 104   Temp 36.3 °C (97.4 °F) (Tympanic)   Resp 17   LMP 01/01/1993   SpO2 94%     Physical Exam  Constitutional:       Appearance: Normal appearance. She is obese.   HENT:      Head: Normocephalic and atraumatic.   Eyes:      Extraocular Movements: Extraocular movements intact.      Pupils: Pupils are equal, round, and reactive to light.   Cardiovascular:      Rate and Rhythm: Normal rate.   Pulmonary:      Effort: Pulmonary effort is normal. No respiratory distress.   Musculoskeletal:      Cervical back: Normal range of motion.      Comments: Limited ROM due to physical debility   Skin:     General: Skin is warm and dry.   Neurological:      General: No focal deficit present.      Mental Status: She is alert.   Psychiatric:         Mood and Affect: Mood normal.         Behavior: Behavior normal.         WOUND ASSESSMENT                 PROCEDURE:   -Wounds resolved, crusted  -Wound care completed by wound RN, area paste applied  -Patient tolerated the procedure well, without c/o pain or discomfort.       Pertinent Labs and Diagnostics:    Labs:     A1c:   Lab Results   Component Value Date/Time    HBA1C 5.3 02/13/2019 07:57 PM               ASSESSMENT AND PLAN:     1. Pressure injury of buttock, stage 3, unspecified laterality (HCC)  Recurrent issue    8/3/2021: Both wounds are crusted over today, no open wounds appreciated.  Likely to recur given the location and patient's  lack of mobility.  -Discharge from [facility]  -Patient to return to clinic ASAP if wound recurs, or if he/she develops new wounds  -Patient encouraged to continue with daily applications of zinc barrier paste, and to minimize sitting    Wound care: Zinc paste    2. Age-related physical debility  Complicating factor.     3. Obesity due to excess calories, unspecified classification, unspecified whether serious comorbidity present  Complicating factor    4. Incontinence associated dermatitis  Complicating factor.  Discussed changing pad as soon as it is soiled    PATIENT EDUCATION  -Etiology of pressure injury  -Importance of offloading and frequent repositioning  -Strategies for offloading in bed and when seated discussed and demonstrated  - Importance of adequate nutrition for wound healing  - Advised to go to ER for any increased redness, swelling, drainage or odor, or if patient develops fever, chills, nausea or vomiting.    My total time spent caring for the patient on the day of the encounter was 20 minutes.   This does not include time spent on separately billable procedures/tests.    Please note that this note may have been created using voice recognition software. I have worked with technical experts from [employer] to optimize the interface.  I have made every reasonable attempt to correct obvious errors, but there may be errors of grammar and possibly content that I did not discover before finalizing the note.    N

## 2021-08-07 DIAGNOSIS — I10 ESSENTIAL HYPERTENSION: ICD-10-CM

## 2021-08-09 RX ORDER — LISINOPRIL 5 MG/1
TABLET ORAL
Qty: 100 TABLET | Refills: 3 | Status: SHIPPED | OUTPATIENT
Start: 2021-08-09 | End: 2022-12-13 | Stop reason: SDUPTHER

## 2021-08-09 RX ORDER — MAGNESIUM OXIDE 400 MG/1
TABLET ORAL
Qty: 90 TABLET | Refills: 3 | Status: SHIPPED | OUTPATIENT
Start: 2021-08-09 | End: 2022-12-12 | Stop reason: SDUPTHER

## 2021-08-09 NOTE — TELEPHONE ENCOUNTER
Requested Prescriptions     Signed Prescriptions Disp Refills   • magnesium oxide (MAG-OX) 400 MG Tab tablet 90 tablet 3     Sig: TAKE ONE TABLET BY MOUTH EVERY DAY     Authorizing Provider: ROSE HILARIO A.P.R.N.

## 2021-08-13 ENCOUNTER — ANTICOAGULATION MONITORING (OUTPATIENT)
Dept: MEDICAL GROUP | Facility: PHYSICIAN GROUP | Age: 81
End: 2021-08-13

## 2021-08-13 DIAGNOSIS — Z79.01 CHRONIC ANTICOAGULATION: ICD-10-CM

## 2021-08-13 LAB — INR PPP: 1.8 (ref 2–3.5)

## 2021-08-13 NOTE — PROGRESS NOTES
Anticoagulation Summary  As of 2021    INR goal:  2.0-3.0   TTR:  72.8 % (6.1 y)   INR used for dosin.80 (2021)   Warfarin maintenance plan:  3.75 mg (2.5 mg x 1.5) every Fri; 2.5 mg (2.5 mg x 1) all other days   Weekly warfarin total:  18.75 mg   Plan last modified:  Vladimir Taylor PharmD (2021)   Next INR check:  2021   Target end date:  Indefinite    Indications    Atrial fibrillation (HCC) (Resolved) [I48.91]  Chronic anticoagulation [Z79.01]             Anticoagulation Episode Summary     INR check location:  Home Draw    Preferred lab:      Send INR reminders to:      Comments:  [place] YE[place]      Anticoagulation Care Providers     Provider Role Specialty Phone number    Lizzy Haywood M.D. Referring Cardiology [phone]    [facility] Responsible  [phone]    Vladimir Taylor, PharmD Responsible          Anticoagulation Patient Findings  Patient Findings     Negatives:  Signs/symptoms of thrombosis, Signs/symptoms of bleeding, Laboratory test error suspected, Change in health, Change in alcohol use, Change in activity, Upcoming invasive procedure, Emergency department visit, Upcoming dental procedure, Missed doses, Extra doses, Change in medications, Change in diet/appetite, Hospital admission, Bruising, Other complaints        Spoke with patient today regarding subtherapeutic INR of 1.8.  Patient denies any signs/symptoms of bruising or bleeding or any changes in diet and medications.  Instructed patient to call clinic with any questions or concerns.  Instructed patient to increase weekly warfarin regimen as detailed above.  Follow up in 2 weeks, to reduce risk of adverse events related to this high risk medication,  Warfarin.    Vladimir Taylor, PharmD, BCACP

## 2021-08-20 ENCOUNTER — TELEPHONE (OUTPATIENT)
Dept: MEDICAL GROUP | Facility: PHYSICIAN GROUP | Age: 81
End: 2021-08-20

## 2021-08-20 NOTE — TELEPHONE ENCOUNTER
Future Appointments       Provider Department Center    8/20/2021 4:00 PM [facility]    8/26/2021 10:00 AM [facility]    11/10/2021 9:15 AM PACER CHECK-[facility] 2 [facility]     11/10/2021 9:30 AM Kishore Richards M.D. [facility]         ESTABLISHED PATIENT PRE-VISIT PLANNING     Patient was NOT contacted to complete PVP.     Note: Patient will not be contacted if there is no indication to call.     1.  Reviewed notes from the last few office visits within the medical group: Yes, LOV 4/19/2021    2.  If any orders were placed at last visit or intended to be done for this visit (i.e. 6 mos follow-up), do we have Results/Consult Notes?         •  Labs - Labs were not ordered at last office visit.  Note: If patient appointment is for lab review and patient did not complete labs, check with provider if OK to reschedule patient until labs completed.       •  Imaging - Imaging was not ordered at last office visit.       •  Referrals - No referrals were ordered at last office visit.    3. Is this appointment scheduled as a Hospital Follow-Up? No    4.  Immunizations were updated in Epic using Reconcile Outside Information activity? Yes    5.  Patient is due for the following Health Maintenance Topics:   Health Maintenance Due   Topic Date Due   • IMM ZOSTER VACCINES (1 of 2) Never done   • Annual Wellness Visit  12/06/2019     6.  AHA (Pulse8) form printed for Provider? N/A

## 2021-08-23 DIAGNOSIS — Z79.01 CHRONIC ANTICOAGULATION: ICD-10-CM

## 2021-08-23 RX ORDER — WARFARIN SODIUM 2.5 MG/1
TABLET ORAL
Qty: 135 TABLET | Refills: 1 | Status: SHIPPED | OUTPATIENT
Start: 2021-08-23 | End: 2022-01-12

## 2021-08-25 ENCOUNTER — TELEPHONE (OUTPATIENT)
Dept: MEDICAL GROUP | Facility: PHYSICIAN GROUP | Age: 81
End: 2021-08-25

## 2021-08-25 NOTE — TELEPHONE ENCOUNTER
VOICEMAIL  1. Caller Name: Donte             Call Back Number: [phone] (home)    2. Message: Patient left message regarding having swelling in her legs and feet and would like a prescription for this. She states it would be inconvenient for her to come down for an appointment as she does not drive and would have to arrange a ride.     3. Patient approves office to leave a detailed voicemail/MyChart message: N\A

## 2021-08-25 NOTE — TELEPHONE ENCOUNTER
Patient has prescription for spironolactone and torsemide.  We will have MA call and clarify that she is taking these medications.  She saw cardiology 5/7/2021 and torsemide was added to her medications.  Her last office visit with me was 4/19/2021.  If patient is having any chest pain, shortness of breath, difficulty breathing she is to go to the emergency room or call 911.

## 2021-08-25 NOTE — TELEPHONE ENCOUNTER
Phone Number Called: [phone] (home)    Call outcome: Spoke to patient regarding message below.    Message: Patient states she now remembers she has an appointment tomorrow and will wait till then to be evaluated. I did inform her if she is having any of the symptoms listed by Rosi to call 911 and she stated understanding.

## 2021-08-26 ENCOUNTER — OFFICE VISIT (OUTPATIENT)
Dept: MEDICAL GROUP | Facility: PHYSICIAN GROUP | Age: 81
End: 2021-08-26
Payer: MEDICARE

## 2021-08-26 VITALS
HEIGHT: 64 IN | BODY MASS INDEX: 32.1 KG/M2 | RESPIRATION RATE: 16 BRPM | HEART RATE: 81 BPM | WEIGHT: 188 LBS | TEMPERATURE: 98 F | SYSTOLIC BLOOD PRESSURE: 110 MMHG | DIASTOLIC BLOOD PRESSURE: 58 MMHG | OXYGEN SATURATION: 96 %

## 2021-08-26 DIAGNOSIS — M54.2 NECK PAIN, ACUTE: ICD-10-CM

## 2021-08-26 DIAGNOSIS — I48.19 PERSISTENT ATRIAL FIBRILLATION (HCC): ICD-10-CM

## 2021-08-26 DIAGNOSIS — R60.0 BILATERAL LEG EDEMA: ICD-10-CM

## 2021-08-26 DIAGNOSIS — Z95.0 CARDIAC PACEMAKER IN SITU: ICD-10-CM

## 2021-08-26 DIAGNOSIS — M32.9 PERSONAL HISTORY OF SYSTEMIC LUPUS ERYTHEMATOSUS (SLE) (HCC): ICD-10-CM

## 2021-08-26 DIAGNOSIS — Z79.01 CHRONIC ANTICOAGULATION: ICD-10-CM

## 2021-08-26 DIAGNOSIS — B35.1 ONYCHOMYCOSIS: ICD-10-CM

## 2021-08-26 DIAGNOSIS — I25.10 CORONARY ARTERY DISEASE INVOLVING NATIVE CORONARY ARTERY OF NATIVE HEART WITHOUT ANGINA PECTORIS: ICD-10-CM

## 2021-08-26 DIAGNOSIS — I10 ESSENTIAL HYPERTENSION: ICD-10-CM

## 2021-08-26 PROCEDURE — 99214 OFFICE O/P EST MOD 30 MIN: CPT | Performed by: NURSE PRACTITIONER

## 2021-08-26 RX ORDER — TORSEMIDE 20 MG/1
20 TABLET ORAL 2 TIMES DAILY
Qty: 60 TABLET | Refills: 5 | Status: SHIPPED
Start: 2021-08-26 | End: 2021-08-27

## 2021-08-26 ASSESSMENT — FIBROSIS 4 INDEX: FIB4 SCORE: 1.8

## 2021-08-26 NOTE — PROGRESS NOTES
"Subjective:     CC: leg swelling and neck pain    HPI:   Donte presents today with her grandson Jb for the following:     Bilateral leg edema  Chronic medical problem.  She reports increased lower leg edema. She is not sure when the leg edema got worse. Chart review shows that she saw her cardiologist on 5/7/2021 who prescribed torsemide 20 mg twice daily and stopped her spirolactone.  She has not been taking her torsemide. She is sedentary spending most of her time in her recliner chair.  She is not having any chest pain, difficulty breathing, shortness of breath at rest, dizziness or headache.    Neck pain, acute  Acute medical problem.  She states that she noticed 1 day ago that she is having intermittent neck pain.  She does sleep in her recliner chair.  She states that she is unable to sleep in her bed.  She has not tried any interventions.  She denies any recent falls.      Past Medical History:   Diagnosis Date   • A-fib ([facility])    • Anesthesia     \"Tachycardia for 5 days after cataract surgery\"   • Anticoagulant long-term use 1/12/2012   • Arthritis     osteo-Knees, hips   • Atrial fibrillation ([facility])    • Backpain     R hip   • Bowel habit changes     diarrhea   • Breath shortness     with exertion, prn O2 2L   • Bronchitis Nov, 2013   • CAD (coronary artery disease)    • Depression    • Glaucoma 5/3/2011   • Hematoma complicating a procedure 11/3/2012   • Hemorrhagic disorder ([facility])     bruising/coumadin   • Hypertension    • Hypothyroid    • Lupus ([facility])    • Macular degeneration    • Menopause 1/12/2012   • Mitral regurgitation 10/30/2012   • Obesity 1/12/2012   • Pacemaker 2018   • Pneumonia feb,2013   • Pre-syncope 6/29/2018   • Pulmonary hypertension ([facility]) 10/30/2012   • PVC's (premature ventricular contractions) 1/12/2012   • Senile nuclear sclerosis    • Spinal stenosis of lumbar region at multiple levels    • Unspecified cataract     repaired bilateral   • Unspecified urinary incontinence    • Urinary " bladder disorder        Social History     Tobacco Use   • Smoking status: Never Smoker   • Smokeless tobacco: Never Used   Vaping Use   • Vaping Use: Never used   Substance Use Topics   • Alcohol use: No   • Drug use: No       Current Outpatient Medications Ordered in Epic   Medication Sig Dispense Refill   • torsemide (DEMADEX) 20 MG Tab Take 1 Tablet by mouth 2 times a day. 60 Tablet 5   • warfarin (COUMADIN) 2.5 MG Tab TAKE ONE TO ONE AND ONE-HALF TABLETS BY MOUTH EVERY DAY AS DIRECTED BY THE [facility] 135 Tablet 1   • lisinopril (PRINIVIL) 5 MG Tab TAKE ONE TABLET BY MOUTH EVERY DAY  100 tablet 3   • magnesium oxide (MAG-OX) 400 MG Tab tablet TAKE ONE TABLET BY MOUTH EVERY DAY  90 tablet 3   • estradiol (ESTRACE) 2 MG Tab TAKE ONE TABLET BY MOUTH ONCE DAILY  90 tablet 0   • atenolol (TENORMIN) 50 MG Tab TAKE ONE TABLET BY MOUTH TWICE DAILY  200 tablet 3   • nystatin (MYCOSTATIN) powder Apply 1 g topically 2 Times a Day. 15 g 1   • sertraline (ZOLOFT) 50 MG Tab Take 1 Tab by mouth every day. 90 Tab 3   • Mirabegron ER (MYRBETRIQ) 50 MG TABLET SR 24 HR Take 50 mg by mouth every day. 90 Tab 3   • ipratropium (ATROVENT) 0.03 % Solution Administer 2 Sprays into affected nostril(S) every 12 hours. 30 mL 2   • levothyroxine (SYNTHROID) 50 MCG Tab TAKE 1 TABLET BY MOUTH EVERY MORNING ON A EMPTY STOMACH 90 Tab 3   • Acetaminophen 500 MG Cap Take 2 Caps by mouth 3 times a day as needed. Indications: Pain     • PREBIOTIC PRODUCT PO Take 2 Tabs by mouth every day.     • Lutein 20 MG Cap Take 1 Cap by mouth every day. 90 Cap 3   • vitamin D (CHOLECALCIFEROL) 1000 UNIT TABS Take 2,000 Units by mouth every day.     • Wound Dressings (TRIAD HYDROPHILIC WOUND DRESSI) Paste Apply 1 Application topically 2 times a day. 30 g 3   • Triamcinolone Acetonide 0.025 % Lotion Apply 1 Squirt topically 2 Times a Day. Apply to itching ears. (Patient not taking: Reported on 8/26/2021) 60 mL 0   • nitrofurantoin (MACROBID)  "100 MG Cap Take 50 mg by mouth every day. (Patient not taking: Reported on 8/26/2021)       No current [facility]-ordered facility-administered medications on file.       Allergies:  Amiodarone, Bactrim, Cipro xr, Metoprolol, Morphine, Phytoplex z-guard [petrolatum-zinc oxide], Pseudoephedrine, Qvar [beclomethasone dipropionate], Vibramycin, Atorvastatin calcium-polysorbate 80, Augmentin, Diltiazem, Flecainide, Keflex, Mucinex, Tramadol, Atorvastatin, and Tape    Health Maintenance: Reviewed    ROS:  Per HPI    Objective:     Vital signs reviewed  Exam:  /58 (BP Location: Right arm, Patient Position: Sitting, BP Cuff Size: Adult)   Pulse 81   Temp 36.7 °C (98 °F) (Temporal)   Resp 16   Ht 1.626 m (5' 4\")   Wt 85.3 kg (188 lb)   LMP 01/01/1993   SpO2 96%   BMI 32.27 kg/m²  Body mass index is 32.27 kg/m².    Gen: Alert and oriented, No apparent distress.  Grandson present in exam room.  Eyes:   Lids normal. Glasses in place.   Lungs: Normal effort, CTA bilaterally, no wheezes, rhonchi, or rales  CV: Regular rate and rhythm. No murmurs, rubs, or gallops.  Ext: No clubbing, cyanosis.  Bilateral lower extremities with 2-3+ pitting edema, skin is tight and shiny with scattered skin fissures to bilateral lower extremities.  No discharge present.  No warmth or redness to surrounding skin.  Multiple toenails with yellow, thick, raised nails.    Assessment & Plan:     80 y.o. female with the following -     1. Bilateral leg edema  2. Coronary artery disease involving native coronary artery of native heart without angina pectoris  3. Chronic anticoagulation  4. Cardiac pacemaker in situ  5. Essential hypertension  6. Persistent atrial fibrillation (HCC)  7. Personal history of systemic lupus erythematosus (SLE) (HCC)  Chronic exacerbated problem.  Reviewed her medications and we discussed that she needs to start her torsemide 20 mg twice daily dosing as this will help with her leg edema.  We discussed elevating her " legs when at rest.  We discussed changing positions frequently and at a minimum getting up and ambulating at least once an hour.  We discussed moisturizing her skin with a good skin moisturizer and monitoring for signs/symptoms of infection.  She will return in 1 month for follow-up.  Red flags discussed strict ER precautions.  - torsemide (DEMADEX) 20 MG Tab; Take 1 Tablet by mouth 2 times a day.  Dispense: 60 Tablet; Refill: 5    8. Onychomycosis  Acute uncomplicated problem.  Grandson asked for us to trim his grandmothers toenails today.  Exam consistent with onychomycosis.  Referral placed to podiatry.  - REFERRAL TO PODIATRY    9. Neck pain, acute  Acute uncomplicated problem.  Recommended that she try a neck pillow to see if this helps improve her neck pain while she is sleeping.  Encouraged her to get out of the chair when sleeping but she states this is a new place she can sleep.  Encouraged her to do neck stretches.      Return in about 4 weeks (around 9/23/2021) for leg swelling .    Please note that this dictation was created using voice recognition software. I have made every reasonable attempt to correct obvious errors, but I expect that there are errors of grammar and possibly content that I did not discover before finalizing the note.

## 2021-08-26 NOTE — ASSESSMENT & PLAN NOTE
Acute medical problem.  She states that she noticed 1 day ago that she is having intermittent neck pain.  She does sleep in her recliner chair.  She states that she is unable to sleep in her bed.  She has not tried any interventions.  She denies any recent falls.

## 2021-08-26 NOTE — ASSESSMENT & PLAN NOTE
Chronic medical problem.  She reports increased lower leg edema. She is not sure when the leg edema got worse. Chart review shows that she saw her cardiologist on 5/7/2021 who prescribed torsemide 20 mg twice daily and stopped her spirolactone.  She has not been taking her torsemide. She is sedentary spending most of her time in her recliner chair.  She is not having any chest pain, difficulty breathing, shortness of breath at rest, dizziness or headache.

## 2021-08-27 ENCOUNTER — TELEPHONE (OUTPATIENT)
Dept: MEDICAL GROUP | Facility: PHYSICIAN GROUP | Age: 81
End: 2021-08-27

## 2021-08-27 DIAGNOSIS — R60.0 BILATERAL LOWER EXTREMITY EDEMA: ICD-10-CM

## 2021-08-27 DIAGNOSIS — R60.0 BILATERAL LEG EDEMA: ICD-10-CM

## 2021-08-27 DIAGNOSIS — I25.10 CORONARY ARTERY DISEASE INVOLVING NATIVE CORONARY ARTERY OF NATIVE HEART WITHOUT ANGINA PECTORIS: ICD-10-CM

## 2021-08-27 DIAGNOSIS — I48.19 PERSISTENT ATRIAL FIBRILLATION (HCC): ICD-10-CM

## 2021-08-27 DIAGNOSIS — I10 ESSENTIAL HYPERTENSION: ICD-10-CM

## 2021-08-27 RX ORDER — TORSEMIDE 10 MG/1
10 TABLET ORAL DAILY
Qty: 30 TABLET | Refills: 2 | Status: SHIPPED
Start: 2021-08-27 | End: 2021-08-30

## 2021-08-27 NOTE — TELEPHONE ENCOUNTER
VOICEMAIL  1. Caller Name: Donte                      Call Back Number: [phone] (home)    2. Message: Patient calls regarding the Rxtorsemide (DEMADEX) 20 MG Tab Rosi prescribed her at yesterdays visit (8/26). She states that this could make her dizzy as it is listed as a side effect and she is not able to take the medication. She lives alone and is she were to fall or pass out from the medication then nobody would be there to help her. She states she has some Lasix if she would want to switch her over to that.    3. Patient approves office to leave a detailed voicemail/[facility] [employer] message: N\A

## 2021-08-28 NOTE — TELEPHONE ENCOUNTER
"Phone Number Called: [phone] (home)     Call outcome: Spoke to patient regarding message below.     Message: I informed the patient of Rosi's note and the patient stated she does not feel comfortable taking the medication at all as she took it once and it made her feel \"weird\". She refuses to take the medication and says she will take Lasix as she's been on it before.  "

## 2021-08-28 NOTE — TELEPHONE ENCOUNTER
Please call patient and inform her that we will try a lower dose of 10 mg a day.  She can take the medication in the morning.  Thank you.    Requested Prescriptions     Signed Prescriptions Disp Refills   • torsemide (DEMADEX) 10 MG tablet 30 Tablet 2     Sig: Take 1 Tablet by mouth every day.     Authorizing Provider: NORBERT PARADA A.P.R.N.

## 2021-08-30 RX ORDER — FUROSEMIDE 20 MG/1
20 TABLET ORAL DAILY
Qty: 30 TABLET | Refills: 0 | Status: SHIPPED
Start: 2021-08-30 | End: 2022-06-09

## 2021-08-30 NOTE — TELEPHONE ENCOUNTER
Review of chart shows that furosemide was discontinued 1/10/2020. Patient declining to start torsemide even after decreased dose provider ordered. I will start her on furosemide 20 mg daily and have her recheck her labs in 1 week. I encourage her to follow-up with cardiology.

## 2021-08-30 NOTE — TELEPHONE ENCOUNTER
Phone Number Called: [phone] (home)    Call outcome: Spoke to patient regarding message below.    Message: Patient stated verbal understanding of Rosi's note

## 2021-09-03 ENCOUNTER — ANTICOAGULATION MONITORING (OUTPATIENT)
Dept: VASCULAR LAB | Facility: MEDICAL CENTER | Age: 81
End: 2021-09-03

## 2021-09-03 DIAGNOSIS — Z79.01 CHRONIC ANTICOAGULATION: ICD-10-CM

## 2021-09-03 LAB — INR PPP: 2.3 (ref 2–3.5)

## 2021-09-03 NOTE — PROGRESS NOTES
OP Telephone Anticoagulation Service Note    Date: 9/3/2021      Anticoagulation Summary  As of 9/3/2021    INR goal:  2.0-3.0   TTR:  72.7 % (6.1 y)   INR used for dosin.30 (9/3/2021)   Warfarin maintenance plan:  2.5 mg (2.5 mg x 1) every day   Weekly warfarin total:  17.5 mg   Plan last modified:  Mariah Theodore, Pharmacy Intern (9/3/2021)   Next INR check:  2021   Target end date:  Indefinite    Indications    Atrial fibrillation (HCC) (Resolved) [I48.91]  Chronic anticoagulation [Z79.01]             Anticoagulation Episode Summary     INR check location:  Home Draw    Preferred lab:      Send INR reminders to:      Comments:  Winston YEAGER      Anticoagulation Care Providers     Provider Role Specialty Phone number    Lizzy Haywood M.D. Referring Cardiovascular Disease (Cardiology) [phone]    [facility] Anticoagulation Services Responsible  [phone]    Vladimir Taylor, PharmD Responsible          Anticoagulation Patient Findings  Patient Findings     Negatives:  Signs/symptoms of thrombosis, Signs/symptoms of bleeding, Laboratory test error suspected, Change in health, Change in alcohol use, Change in activity, Upcoming invasive procedure, Emergency department visit, Upcoming dental procedure, Missed doses, Extra doses, Change in medications, Change in diet/appetite, Hospital admission, Bruising, Other complaints          INR therapeutic at 2.3.  Spoke w/ pt on phone.  Verified regimen w/ pt. - pt has been taking 2.5 mg daily. Instructed pt to continue this warfarin regimen as her INR has been back at goal.  NO s/s bleeding reported per pt.  NO changes in diet reported per pt.  NO changes in medications reported per pt.  Check INR in 2 week(s).  Instructed pt to call clinic at [phone] if there are any questions.  Pt stated understanding.    Pt is not on antiplatelet therapy.    Mariah Theodore, Pharmacy Intern.    I agree with the above plan.  Rayray GreenD

## 2021-09-17 ENCOUNTER — ANTICOAGULATION MONITORING (OUTPATIENT)
Dept: VASCULAR LAB | Facility: MEDICAL CENTER | Age: 81
End: 2021-09-17

## 2021-09-17 DIAGNOSIS — Z79.01 CHRONIC ANTICOAGULATION: ICD-10-CM

## 2021-09-17 LAB — INR PPP: 2.6 (ref 2–3.5)

## 2021-09-18 NOTE — PROGRESS NOTES
Anticoagulation Summary  As of 2021    INR goal:  2.0-3.0   TTR:  72.8 % (6.2 y)   INR used for dosin.60 (2021)   Warfarin maintenance plan:  2.5 mg (2.5 mg x 1) every day   Weekly warfarin total:  17.5 mg   Plan last modified:  Mariah Theodore, Pharmacy Intern (9/3/2021)   Next INR check:  10/1/2021   Target end date:  Indefinite    Indications    Atrial fibrillation (HCC) (Resolved) [I48.91]  Chronic anticoagulation [Z79.01]             Anticoagulation Episode Summary     INR check location:  Home Draw    Preferred lab:      Send INR reminders to:      Comments:  Winston       Anticoagulation Care Providers     Provider Role Specialty Phone number    Lizzy Haywood M.D. Referring Cardiovascular Disease (Cardiology) [phone]    [facility] Anticoagulation Services Responsible  [phone]    Vladimir Taylor, PharmD Responsible          Anticoagulation Patient Findings  Patient Findings     Negatives:  Signs/symptoms of thrombosis, Signs/symptoms of bleeding, Laboratory test error suspected, Change in health, Change in alcohol use, Change in activity, Upcoming invasive procedure, Emergency department visit, Upcoming dental procedure, Missed doses, Extra doses, Change in medications, Change in diet/appetite, Hospital admission, Bruising, Other complaints            Spoke with patient today regarding a therapeutic INR of 2.6.  Patient denies any signs/symptoms of bruising or bleeding or any changes in diet and medications.  Instructed patient to call clinic with any questions or concerns.    Pt is to continue with current warfarin dosing regimen.    Follow up in 2 weeks, to reduce risk of adverse events related to this high risk medication,  Warfarin.    Aniket Clark, Pharmacy Intern

## 2021-10-01 LAB — INR PPP: 2.3 (ref 2–3.5)

## 2021-10-12 ENCOUNTER — TELEPHONE (OUTPATIENT)
Dept: VASCULAR LAB | Facility: MEDICAL CENTER | Age: 81
End: 2021-10-12

## 2021-10-15 ENCOUNTER — ANTICOAGULATION MONITORING (OUTPATIENT)
Dept: VASCULAR LAB | Facility: MEDICAL CENTER | Age: 81
End: 2021-10-15

## 2021-10-15 DIAGNOSIS — Z79.01 CHRONIC ANTICOAGULATION: ICD-10-CM

## 2021-10-15 LAB — INR PPP: 1.7 (ref 2–3.5)

## 2021-10-15 NOTE — PROGRESS NOTES
OP Telephone Anticoagulation Service Note    Date: 10/15/2021      Anticoagulation Summary  As of 10/15/2021    INR goal:  2.0-3.0   TTR:  72.8 % (6.2 y)   INR used for dosin.70 (10/15/2021)   Warfarin maintenance plan:  2.5 mg (2.5 mg x 1) every day   Weekly warfarin total:  17.5 mg   Plan last modified:  Mariah Theodore, Pharmacy Intern (9/3/2021)   Next INR check:  10/29/2021   Target end date:  Indefinite    Indications    Atrial fibrillation (HCC) (Resolved) [I48.91]  Chronic anticoagulation [Z79.01]             Anticoagulation Episode Summary     INR check location:  Home Draw    Preferred lab:      Send INR reminders to:      Comments:  Winston YEAGER      Anticoagulation Care Providers     Provider Role Specialty Phone number    Lizzy Haywood M.D. Referring Cardiovascular Disease (Cardiology) [phone]    [facility] Anticoagulation Services Responsible  [phone]    Vladimir Taylor, PharmD Responsible          Anticoagulation Patient Findings  Patient Findings     Positives:  Change in diet/appetite (Eating more broccoli)    Negatives:  Signs/symptoms of thrombosis, Signs/symptoms of bleeding, Laboratory test error suspected, Change in health, Change in alcohol use, Change in activity, Upcoming invasive procedure, Emergency department visit, Upcoming dental procedure, Missed doses, Extra doses, Change in medications, Hospital admission, Bruising, Other complaints          INR SUB-therapeutic at 1.7.  Spoke w/ pt on phone.  Verified regimen w/ pt.  Instructed pt to bolus x 1 dose w/ 3.75 mg and to then continue on w/ her current regimen.  NO s/s bleeding reported per pt.  NO changes in medications reported per pt.  Check INR in 2 week(s).  Instructed pt to call clinic at [phone] if there are any questions.  Pt stated understanding.    Pt is not on antiplatelet therapy.    Joe Kirby, PeterD

## 2021-10-29 LAB — INR PPP: 1.4 (ref 2–3.5)

## 2021-11-01 ENCOUNTER — ANTICOAGULATION MONITORING (OUTPATIENT)
Dept: VASCULAR LAB | Facility: MEDICAL CENTER | Age: 81
End: 2021-11-01

## 2021-11-01 DIAGNOSIS — Z79.01 CHRONIC ANTICOAGULATION: ICD-10-CM

## 2021-11-01 NOTE — PROGRESS NOTES
Anticoagulation Summary  As of 2021    INR goal:  2.0-3.0   TTR:  72.4 % (6.3 y)   INR used for dosin.40 (10/29/2021)   Warfarin maintenance plan:  3.75 mg (2.5 mg x 1.5) every Mon; 2.5 mg (2.5 mg x 1) all other days   Weekly warfarin total:  18.75 mg   Plan last modified:  Patti Ponce PharmD (2021)   Next INR check:  2021   Target end date:  Indefinite    Indications    Atrial fibrillation (HCC) (Resolved) [I48.91]  Chronic anticoagulation [Z79.01]             Anticoagulation Episode Summary     INR check location:  Home Draw    Preferred lab:      Send INR reminders to:      Comments:  Winston YEAGER      Anticoagulation Care Providers     Provider Role Specialty Phone number    Lizzy Haywood M.D. Referring Cardiovascular Disease (Cardiology) [phone]    [facility] Anticoagulation Services Responsible  [phone]    Peter HollowayD Responsible            Refer to Anticoagulation Patient Findings for HPI  Patient Findings     Negatives:  Signs/symptoms of thrombosis, Signs/symptoms of bleeding, Laboratory test error suspected, Change in health, Change in alcohol use, Change in activity, Upcoming invasive procedure, Emergency department visit, Upcoming dental procedure, Missed doses, Extra doses, Change in medications, Change in diet/appetite, Hospital admission, Bruising, Other complaints          Spoke with pt.  INR is subtherapeutic.     Pt verifies warfarin weekly dosing.     Will have pt increase weekly regimen    Repeat INR in 2 week(s).     Patti Ponce, Tim

## 2021-11-10 ENCOUNTER — OFFICE VISIT (OUTPATIENT)
Dept: CARDIOLOGY | Facility: MEDICAL CENTER | Age: 81
End: 2021-11-10
Payer: MEDICARE

## 2021-11-10 ENCOUNTER — NON-PROVIDER VISIT (OUTPATIENT)
Dept: CARDIOLOGY | Facility: MEDICAL CENTER | Age: 81
End: 2021-11-10
Payer: MEDICARE

## 2021-11-10 VITALS
WEIGHT: 189 LBS | DIASTOLIC BLOOD PRESSURE: 60 MMHG | HEART RATE: 82 BPM | HEIGHT: 64 IN | SYSTOLIC BLOOD PRESSURE: 104 MMHG | BODY MASS INDEX: 32.27 KG/M2 | OXYGEN SATURATION: 96 %

## 2021-11-10 DIAGNOSIS — Z79.01 CHRONIC ANTICOAGULATION: ICD-10-CM

## 2021-11-10 DIAGNOSIS — I10 ESSENTIAL HYPERTENSION: ICD-10-CM

## 2021-11-10 DIAGNOSIS — Z95.0 CARDIAC PACEMAKER IN SITU: ICD-10-CM

## 2021-11-10 DIAGNOSIS — I25.10 CORONARY ARTERY DISEASE INVOLVING NATIVE CORONARY ARTERY OF NATIVE HEART WITHOUT ANGINA PECTORIS: ICD-10-CM

## 2021-11-10 DIAGNOSIS — F32.0 CURRENT MILD EPISODE OF MAJOR DEPRESSIVE DISORDER WITHOUT PRIOR EPISODE (HCC): ICD-10-CM

## 2021-11-10 DIAGNOSIS — R60.9 PERIPHERAL EDEMA: ICD-10-CM

## 2021-11-10 DIAGNOSIS — R60.0 BILATERAL LEG EDEMA: ICD-10-CM

## 2021-11-10 DIAGNOSIS — I48.19 PERSISTENT ATRIAL FIBRILLATION (HCC): ICD-10-CM

## 2021-11-10 DIAGNOSIS — Z79.899 HIGH RISK MEDICATION USE: ICD-10-CM

## 2021-11-10 DIAGNOSIS — N18.31 STAGE 3A CHRONIC KIDNEY DISEASE: ICD-10-CM

## 2021-11-10 DIAGNOSIS — R60.0 BILATERAL LOWER EXTREMITY EDEMA: ICD-10-CM

## 2021-11-10 DIAGNOSIS — I49.5 SSS (SICK SINUS SYNDROME) (HCC): ICD-10-CM

## 2021-11-10 DIAGNOSIS — L89.303 PRESSURE INJURY OF BUTTOCK, STAGE 3, UNSPECIFIED LATERALITY (HCC): ICD-10-CM

## 2021-11-10 PROCEDURE — 99214 OFFICE O/P EST MOD 30 MIN: CPT | Mod: 25 | Performed by: INTERNAL MEDICINE

## 2021-11-10 RX ORDER — ATENOLOL 50 MG/1
50 TABLET ORAL DAILY
Qty: 200 TABLET | Refills: 3 | Status: SHIPPED | OUTPATIENT
Start: 2021-11-10 | End: 2022-12-12 | Stop reason: SDUPTHER

## 2021-11-10 RX ORDER — DIPHENHYDRAMINE HCL 25 MG
25 CAPSULE ORAL
Status: ON HOLD | COMMUNITY
End: 2022-12-30

## 2021-11-10 RX ORDER — FUROSEMIDE 40 MG/1
40 TABLET ORAL DAILY
Qty: 90 TABLET | Refills: 11 | Status: SHIPPED | OUTPATIENT
Start: 2021-11-10 | End: 2023-01-20 | Stop reason: SDUPTHER

## 2021-11-10 ASSESSMENT — ENCOUNTER SYMPTOMS
PALPITATIONS: 0
CHILLS: 0
WHEEZING: 0
SHORTNESS OF BREATH: 0
SPUTUM PRODUCTION: 0
MUSCULOSKELETAL NEGATIVE: 1
CARDIOVASCULAR NEGATIVE: 1
ORTHOPNEA: 0
CLAUDICATION: 0
GASTROINTESTINAL NEGATIVE: 1
BRUISES/BLEEDS EASILY: 0
CONSTITUTIONAL NEGATIVE: 1
EYES NEGATIVE: 1
NEUROLOGICAL NEGATIVE: 1
FEVER: 0
COUGH: 0
SORE THROAT: 0
PND: 0
RESPIRATORY NEGATIVE: 1
HEMOPTYSIS: 0
STRIDOR: 0
LOSS OF CONSCIOUSNESS: 0
WEAKNESS: 0
DIZZINESS: 0

## 2021-11-10 ASSESSMENT — FIBROSIS 4 INDEX: FIB4 SCORE: 1.8

## 2021-11-10 NOTE — PROGRESS NOTES
"Chief Complaint   Patient presents with   • Coronary Artery Disease   • Atrial Fibrillation   • HTN (Controlled)       Subjective:   Donte Magaña is a 79 y.o. female who presents today as a follow-up for her atrial fibrillation sick sinus syndrome status post pacemaker hypertension hyperlipidemia.  She had her pacemaker check today that showed 99% V pacing with no runs of atrial fibrillation.   Since she was last seen she continues to do well.  Blood pressure control.  She had to increase her furosemide.  Otherwise she is been well.    Past Medical History:   Diagnosis Date   • A-fib (HCC)    • Anesthesia     \"Tachycardia for 5 days after cataract surgery\"   • Anticoagulant long-term use 1/12/2012   • Arthritis     osteo-Knees, hips   • Atrial fibrillation (HCC)    • Backpain     R hip   • Bowel habit changes     diarrhea   • Breath shortness     with exertion, prn O2 2L   • Bronchitis Nov, 2013   • CAD (coronary artery disease)    • Depression    • Glaucoma 5/3/2011   • Hematoma complicating a procedure 11/3/2012   • Hemorrhagic disorder (HCC)     bruising/coumadin   • Hypertension    • Hypothyroid    • Lupus (HCC)    • Macular degeneration    • Menopause 1/12/2012   • Mitral regurgitation 10/30/2012   • Obesity 1/12/2012   • Pacemaker 2018   • Pneumonia feb,2013   • Pre-syncope 6/29/2018   • Pulmonary hypertension (HCC) 10/30/2012   • PVC's (premature ventricular contractions) 1/12/2012   • Senile nuclear sclerosis    • Spinal stenosis of lumbar region at multiple levels    • Unspecified cataract     repaired bilateral   • Unspecified urinary incontinence    • Urinary bladder disorder      Past Surgical History:   Procedure Laterality Date   • PB COMBINED ANT/POST COLPORRHAPHY  1/14/2020    Procedure: COLPORRHAPHY, COMBINED ANTEROPOSTERIOR - PERINEOPLASTY;  Surgeon: Waqas Robin M.D.;  Location: SURGERY SAME DAY [facility];  Service: Gynecology   • PB LAP,DIAGNOSTIC ABDOMEN  1/14/2020    Procedure: " PELVISCOPY;  Surgeon: Waqas Robin M.D.;  Location: SURGERY SAME DAY [facility];  Service: Gynecology   • ENTEROCELE REPAIR  1/14/2020    Procedure: REPAIR, ENTEROCELE;  Surgeon: Waqas Robin M.D.;  Location: SURGERY SAME DAY [facility];  Service: Gynecology   • BLADDER SLING FEMALE  1/14/2020    Procedure: BLADDER SLING, FEMALE - TOT;  Surgeon: Waqas Robin M.D.;  Location: SURGERY SAME DAY [facility];  Service: Gynecology   • VAGINAL SUSPENSION  1/14/2020    Procedure: COLPOPEXY - SACROSPINOUS VAULT SUSPENSION;  Surgeon: Waqas Robin M.D.;  Location: SURGERY SAME DAY [facility];  Service: Gynecology   • SALPINGO OOPHORECTOMY Bilateral 1/14/2020    Procedure: SALPINGO-OOPHORECTOMY;  Surgeon: Waqas Robin M.D.;  Location: SURGERY SAME DAY [facility];  Service: Gynecology   • IRRIGATION & DEBRIDEMENT ORTHO Right 2/14/2019    Procedure: IRRIGATION & DEBRIDEMENT ORTHO-HIP WOUND ;  Surgeon: Vladimir Lee M.D.;  Location: [facility];  Service: Orthopedics   • HIP ARTH ANTERIOR TOTAL Right 1/17/2019    Procedure: HIP ARTHROPLASTY ANTERIOR TOTAL;  Surgeon: Juan C Mercedes M.D.;  Location: [facility];  Service: Orthopedics   • PACEMAKER INSERTION  06/30/2018    Dual Chamber   • KNEE ARTHROPLASTY TOTAL Right 6/23/2016    Procedure: KNEE ARTHROPLASTY TOTAL;  Surgeon: Heriberto Lozada M.D.;  Location: [facility];  Service:    • KNEE ARTHROPLASTY TOTAL Left 5/28/2015    Procedure: KNEE ARTHROPLASTY TOTAL;  Surgeon: Heriberto Lozada M.D.;  Location: [facility];  Service:    • CATARACT PHACO WITH IOL Right 5/5/2015    Procedure: IOL OD - STANDARD;  Surgeon: Dmitry Bejarano M.D.;  Location: [facility];  Service:    • CATARACT PHACO WITH IOL  4/21/2015    Performed by Dmitry Bejarano M.D. at [facility]   • RECOVERY  11/30/2010    Performed by SURGERY, CATH-RECOVERY at [facility] SAME DAY [facility]   • COLONOSCOPY   2008    Normal    [facility]   • ABDOMINAL HYSTERECTOMY TOTAL  April 15,1975    still has ovaries   • OPEN REDUCTION      left ankle   • OTHER      Removed pins from left ankle   • OTHER CARDIAC SURGERY  12/2017 and 07/19/2018     Cardiac Ablation   • TONSILLECTOMY AND ADENOIDECTOMY       Family History   Problem Relation Age of Onset   • Stroke Mother    • Diabetes Father    • Stroke Sister    • Heart Disease Brother    • Stroke Sister    • GI Disease Daughter         Crohn's Disease   • Heart Disease Daughter         CHF   • Other Daughter         Chronic Pain--Lymphedema   • Cancer Paternal Aunt         breast     Social History     Socioeconomic History   • Marital status:      Spouse name: Not on file   • Number of children: Not on file   • Years of education: Not on file   • Highest education level: Not on file   Occupational History   • Not on file   Tobacco Use   • Smoking status: Never Smoker   • Smokeless tobacco: Never Used   Vaping Use   • Vaping Use: Never used   Substance and Sexual Activity   • Alcohol use: No   • Drug use: No   • Sexual activity: Not Currently     Partners: Male     Birth control/protection: Post-Menopausal   Other Topics Concern   • Not on file   Social History Narrative   • Not on file     Social Determinants of Health     Financial Resource Strain:    • Difficulty of Paying Living Expenses: Not on file   Food Insecurity:    • Worried About Running Out of Food in the Last Year: Not on file   • Ran Out of Food in the Last Year: Not on file   Transportation Needs:    • Lack of Transportation (Medical): Not on file   • Lack of Transportation (Non-Medical): Not on file   Physical Activity:    • Days of Exercise per Week: Not on file   • Minutes of Exercise per Session: Not on file   Stress:    • Feeling of Stress : Not on file   Social Connections:    • Frequency of Communication with Friends and Family: Not on file   • Frequency of Social Gatherings with Friends and Family:  "Not on file   • Attends [employer] Services: Not on file   • Active Member of Clubs or Organizations: Not on file   • Attends Club or Organization Meetings: Not on file   • Marital Status: Not on file   Intimate Partner Violence:    • Fear of Current or Ex-Partner: Not on file   • Emotionally Abused: Not on file   • Physically Abused: Not on file   • Sexually Abused: Not on file   Housing Stability:    • Unable to Pay for Housing in the Last Year: Not on file   • Number of Places Lived in the Last Year: Not on file   • Unstable Housing in the Last Year: Not on file     Allergies   Allergen Reactions   • Amiodarone Hives     Throat and tongue itching   • Bactrim Shortness of Breath   • Cipro Xr Swelling   • Metoprolol Swelling     Causes throat swelling   • Morphine Unspecified     Hallucinations   • Phytoplex Z-Guard [Petrolatum-Zinc Oxide] Unspecified     \"causes burning\"   • Pseudoephedrine Palpitations   • Qvar [Beclomethasone Dipropionate] Unspecified     Pressure on heart     • Vibramycin Shortness of Breath   • Atorvastatin Calcium-Polysorbate 80 Unspecified     Muscle aches     • Augmentin Unspecified     Unknown reaction   • Diltiazem Rash     rash   • Flecainide Unspecified     dizziness   • Keflex Unspecified     Pt states \"Unsure\".   • Mucinex Unspecified     GI Distress     • Tramadol Unspecified     crying   • Atorvastatin Myalgia   • Tape Rash     Paper tape okay     Outpatient Encounter Medications as of 11/10/2021   Medication Sig Dispense Refill   • Sennosides (SENOKOT PO) Take  by mouth.     • diphenhydrAMINE HCl (BENADRYL ALLERGY PO) Take  by mouth.     • atenolol (TENORMIN) 50 MG Tab Take 1 Tablet by mouth every day. TWICE DAILY 200 Tablet 3   • furosemide (LASIX) 40 MG Tab Take 1 Tablet by mouth every day. 90 Tablet 11   • furosemide (LASIX) 20 MG Tab Take 1 Tablet by mouth every day. 30 Tablet 0   • warfarin (COUMADIN) 2.5 MG Tab TAKE ONE TO ONE AND ONE-HALF TABLETS BY MOUTH EVERY DAY AS " DIRECTED BY THE [facility] 135 Tablet 1   • lisinopril (PRINIVIL) 5 MG Tab TAKE ONE TABLET BY MOUTH EVERY DAY  100 tablet 3   • magnesium oxide (MAG-OX) 400 MG Tab tablet TAKE ONE TABLET BY MOUTH EVERY DAY  90 tablet 3   • estradiol (ESTRACE) 2 MG Tab TAKE ONE TABLET BY MOUTH ONCE DAILY  90 tablet 0   • Wound Dressings (TRIAD HYDROPHILIC WOUND DRESSI) Paste Apply 1 Application topically 2 times a day. 30 g 3   • Triamcinolone Acetonide 0.025 % Lotion Apply 1 Squirt topically 2 Times a Day. Apply to itching ears. 60 mL 0   • sertraline (ZOLOFT) 50 MG Tab Take 1 Tab by mouth every day. 90 Tab 3   • Mirabegron ER (MYRBETRIQ) 50 MG TABLET SR 24 HR Take 50 mg by mouth every day. 90 Tab 3   • ipratropium (ATROVENT) 0.03 % Solution Administer 2 Sprays into affected nostril(S) every 12 hours. 30 mL 2   • levothyroxine (SYNTHROID) 50 MCG Tab TAKE 1 TABLET BY MOUTH EVERY MORNING ON A EMPTY STOMACH 90 Tab 3   • Acetaminophen 500 MG Cap Take 2 Caps by mouth 3 times a day as needed. Indications: Pain     • PREBIOTIC PRODUCT PO Take 2 Tabs by mouth every day.     • Lutein 20 MG Cap Take 1 Cap by mouth every day. 90 Cap 3   • vitamin D (CHOLECALCIFEROL) 1000 UNIT TABS Take 2,000 Units by mouth every day.     • [DISCONTINUED] atenolol (TENORMIN) 50 MG Tab TAKE ONE TABLET BY MOUTH TWICE DAILY  200 tablet 3   • [DISCONTINUED] nystatin (MYCOSTATIN) powder Apply 1 g topically 2 Times a Day. (Patient not taking: Reported on 11/10/2021) 15 g 1   • [DISCONTINUED] nitrofurantoin (MACROBID) 100 MG Cap Take 50 mg by mouth every day. (Patient not taking: Reported on 8/26/2021)       No facility-administered encounter medications on file as of 11/10/2021.     Review of Systems   Constitutional: Negative.  Negative for chills, fever and malaise/fatigue.   HENT: Negative.  Negative for sore throat.    Eyes: Negative.    Respiratory: Negative.  Negative for cough, hemoptysis, sputum production, shortness of breath, wheezing and  "stridor.    Cardiovascular: Negative.  Negative for chest pain, palpitations, orthopnea, claudication, leg swelling and PND.   Gastrointestinal: Negative.    Genitourinary: Negative.    Musculoskeletal: Negative.    Skin: Negative.    Neurological: Negative.  Negative for dizziness, loss of consciousness and weakness.   Endo/Heme/Allergies: Negative.  Does not bruise/bleed easily.   All other systems reviewed and are negative.     Objective:   /60 (BP Location: Right arm, Patient Position: Sitting, BP Cuff Size: Adult)   Pulse 82   Ht 1.626 m (5' 4\")   Wt 85.7 kg (189 lb)   LMP 01/01/1993   SpO2 96%   BMI 32.44 kg/m²     Physical Exam  Vitals and nursing note reviewed.   Constitutional:       General: She is not in acute distress.     Appearance: She is well-developed. She is not diaphoretic.   HENT:      Head: Normocephalic and atraumatic.      Right Ear: External ear normal.      Left Ear: External ear normal.      Nose: Nose normal.      Mouth/Throat:      Pharynx: No oropharyngeal exudate.   Eyes:      General: No scleral icterus.        Right eye: No discharge.         Left eye: No discharge.      Conjunctiva/sclera: Conjunctivae normal.      Pupils: Pupils are equal, round, and reactive to light.   Neck:      Vascular: No JVD.   Cardiovascular:      Rate and Rhythm: Normal rate and regular rhythm.      Heart sounds: No murmur heard.  No friction rub. No gallop.    Pulmonary:      Effort: Pulmonary effort is normal. No respiratory distress.      Breath sounds: No stridor. No wheezing or rales.   Chest:      Chest wall: No tenderness.   Abdominal:      General: There is no distension.      Palpations: Abdomen is soft.      Tenderness: There is no guarding.   Musculoskeletal:         General: No tenderness or deformity. Normal range of motion.      Cervical back: Neck supple.   Skin:     General: Skin is warm and dry.      Coloration: Skin is not pale.      Findings: No erythema or rash. "   Neurological:      Mental Status: She is alert.      Cranial Nerves: No cranial nerve deficit.      Motor: No abnormal muscle tone.      Coordination: Coordination normal.      Deep Tendon Reflexes: Reflexes are normal and symmetric. Reflexes normal.   Psychiatric:         Behavior: Behavior normal.         Thought Content: Thought content normal.         Judgment: Judgment normal.       Assessment:     1. High risk medication use     2. Current mild episode of major depressive disorder without prior episode (HCC)     3. Coronary artery disease involving native coronary artery of native heart without angina pectoris  furosemide (LASIX) 40 MG Tab   4. Chronic anticoagulation  furosemide (LASIX) 40 MG Tab   5. Cardiac pacemaker in situ  furosemide (LASIX) 40 MG Tab   6. Bilateral leg edema     7. Stage 3a chronic kidney disease (HCC)  furosemide (LASIX) 40 MG Tab   8. Pressure injury of buttock, stage 3, unspecified laterality (HCC)  furosemide (LASIX) 40 MG Tab   9. Persistent atrial fibrillation ([facility])     10. Peripheral edema     11. Bilateral lower extremity edema     12. Essential hypertension  atenolol (TENORMIN) 50 MG Tab       Medical Decision Making:  Today's Assessment / Status / Plan:     79-year-old female with sick sinus syndrome status post pacemaker doing well.  We will keep her on the atenolol for her atrial fibrillation.  At this point I will increase her Lasix to 40.  I refilled her atenolol.  I will see her back in 6 months.

## 2021-11-11 PROCEDURE — 93280 PM DEVICE PROGR EVAL DUAL: CPT | Performed by: INTERNAL MEDICINE

## 2021-11-12 ENCOUNTER — ANTICOAGULATION MONITORING (OUTPATIENT)
Dept: VASCULAR LAB | Facility: MEDICAL CENTER | Age: 81
End: 2021-11-12

## 2021-11-12 DIAGNOSIS — Z79.01 CHRONIC ANTICOAGULATION: ICD-10-CM

## 2021-11-12 LAB — INR PPP: 2.4 (ref 2–3.5)

## 2021-11-12 NOTE — PROGRESS NOTES
Anticoagulation Summary  As of 2021    INR goal:  2.0-3.0   TTR:  72.2 % (6.3 y)   INR used for dosin.40 (2021)   Warfarin maintenance plan:  3.75 mg (2.5 mg x 1.5) every Mon; 2.5 mg (2.5 mg x 1) all other days   Weekly warfarin total:  18.75 mg   Plan last modified:  Peter KeyD (2021)   Next INR check:  2021   Target end date:  Indefinite    Indications    Atrial fibrillation (HCC) (Resolved) [I48.91]  Chronic anticoagulation [Z79.01]             Anticoagulation Episode Summary     INR check location:  Home Draw    Preferred lab:      Send INR reminders to:      Comments:  Winston YEAGER      Anticoagulation Care Providers     Provider Role Specialty Phone number    Lizzy Haywood M.D. Referring Cardiovascular Disease (Cardiology) [phone]    [facility] Anticoagulation Services Responsible  [phone]    Peter HollowayD Responsible          Anticoagulation Patient Findings     HPI:  Donte Magaña, on anticoagulation therapy with warfarin for atrial fibrillation.  Changes to current medical/health status since last appt: None  Denies signs/symptoms of bleeding and/or thrombosis since the last appt.    Denies any interval changes to diet  Denies any interval changes to medications since last appt.   Denies any complications or cost restrictions with current therapy.     A/P   INR  -therapeutic.   Pt will continue current Warfarin dosing.     Next INR in 2 week(s).    Guillaume Guzman, Med Ass't       I have reviewed and concur with the above plan.     Fran Leyva, PharmD, MS, BCACP, LCC  [facility]  Phone: [phone],  Fax: [phone]  On call: [phone]

## 2021-11-18 DIAGNOSIS — J30.1 SEASONAL ALLERGIC RHINITIS DUE TO POLLEN: ICD-10-CM

## 2021-11-18 RX ORDER — IPRATROPIUM BROMIDE 21 UG/1
2 SPRAY, METERED NASAL EVERY 12 HOURS
Qty: 30 ML | Refills: 1 | Status: SHIPPED | OUTPATIENT
Start: 2021-11-18 | End: 2022-12-12 | Stop reason: SDUPTHER

## 2021-11-18 RX ORDER — ESTRADIOL 2 MG/1
TABLET ORAL
Qty: 90 TABLET | Refills: 1 | Status: SHIPPED | OUTPATIENT
Start: 2021-11-18 | End: 2022-06-10

## 2021-11-19 NOTE — TELEPHONE ENCOUNTER
Requested Prescriptions     Signed Prescriptions Disp Refills   • ipratropium (ATROVENT) 0.03 % Solution 30 mL 1     Sig: Administer 2 Sprays into affected nostril(S) every 12 hours.     Authorizing Provider: NORBERT PARADA   • estradiol (ESTRACE) 2 MG Tab 90 Tablet 1     Sig: TAKE ONE TABLET BY MOUTH ONCE DAILY     Authorizing Provider: NORBERT PARADA A.P.R.N.

## 2021-12-02 ENCOUNTER — ANTICOAGULATION MONITORING (OUTPATIENT)
Dept: VASCULAR LAB | Facility: MEDICAL CENTER | Age: 81
End: 2021-12-02

## 2021-12-02 DIAGNOSIS — Z79.01 CHRONIC ANTICOAGULATION: ICD-10-CM

## 2021-12-02 LAB — INR PPP: 2.3 (ref 2–3.5)

## 2021-12-09 NOTE — PROGRESS NOTES
Anticoagulation Summary  As of 12/26/2018    INR goal:   2.0-3.0   TTR:   73.7 % (3.5 y)   Today's INR:   2.2 (12/25/2018)   Warfarin maintenance plan:   3.75 mg (2.5 mg x 1.5) on Mon; 2.5 mg (2.5 mg x 1) all other days   Weekly warfarin total:   18.75 mg   Plan last modified:   Ivone Barraza, PharmD (5/8/2018)   Next INR check:   1/8/2019   Target end date:   Indefinite    Indications    Atrial fibrillation (HCC) (Resolved) [I48.91]  Chronic anticoagulation [Z79.01]             Anticoagulation Episode Summary     INR check location:   Home Draw    Preferred lab:       Send INR reminders to:       Comments:   Winston YEAGER      Anticoagulation Care Providers     Provider Role Specialty Phone number    Lizzy Haywood M.D. Referring Cardiology [phone]    [facility] Anticoagulation Services Responsible  [phone]    Vladimir Taylor, PharmD Responsible          Anticoagulation Patient Findings    Spoke with Donte to report a therapeutic INR of 2.2. Continue current dosing regimen.  Follow up in 2 weeks, to reduce the risk of adverse events related to this high risk medication, warfarin.    Massiel Coleman, Clinical Pharmacist       English

## 2021-12-19 DIAGNOSIS — F32.0 CURRENT MILD EPISODE OF MAJOR DEPRESSIVE DISORDER WITHOUT PRIOR EPISODE (HCC): ICD-10-CM

## 2021-12-20 NOTE — TELEPHONE ENCOUNTER
Requested Prescriptions     Signed Prescriptions Disp Refills   • sertraline (ZOLOFT) 50 MG Tab 90 Tablet 2     Sig: TAKE ONE TABLET BY MOUTH EVERY DAY     Authorizing Provider: NORBERT PARADA A.P.R.N.

## 2022-01-10 ENCOUNTER — ANTICOAGULATION MONITORING (OUTPATIENT)
Dept: VASCULAR LAB | Facility: MEDICAL CENTER | Age: 82
End: 2022-01-10

## 2022-01-10 DIAGNOSIS — Z79.01 CHRONIC ANTICOAGULATION: ICD-10-CM

## 2022-01-10 LAB — INR PPP: 2.7 (ref 2–3.5)

## 2022-01-11 NOTE — PROGRESS NOTES
Anticoagulation Summary  As of 1/10/2022    INR goal:  2.0-3.0   TTR:  72.9 % (6.5 y)   INR used for dosin.70 (1/10/2022)   Warfarin maintenance plan:  3.75 mg (2.5 mg x 1.5) every Mon; 2.5 mg (2.5 mg x 1) all other days   Weekly warfarin total:  18.75 mg   Plan last modified:  Peter MandelD (2021)   Next INR check:  2022   Target end date:  Indefinite    Indications    Atrial fibrillation (HCC) (Resolved) [I48.91]  Chronic anticoagulation [Z79.01]             Anticoagulation Episode Summary     INR check location:  Home Draw    Preferred lab:      Send INR reminders to:      Comments:  Winston YEAGER      Anticoagulation Care Providers     Provider Role Specialty Phone number    Lizzy Haywood M.D. Referring Cardiovascular Disease (Cardiology) [phone]    [facility] Anticoagulation Services Responsible  [phone]    Peter HollowayD Responsible Pharmacy           Refer to Anticoagulation Patient Findings for HPI  Patient Findings     Negatives:  Signs/symptoms of thrombosis, Signs/symptoms of bleeding, Laboratory test error suspected, Change in health, Change in alcohol use, Change in activity, Upcoming invasive procedure, Emergency department visit, Upcoming dental procedure, Missed doses, Extra doses, Change in medications, Change in diet/appetite, Hospital admission, Bruising, Other complaints          Spoke with patient to report a therapeutic INR.      Pt is NOT on antiplatelet therapy.    Pt instructed to continue with current warfarin dosing regimen, confirms dosing.   Will follow up in 2 week(s).     Joe Kirby, PharmD, BCACP

## 2022-01-12 RX ORDER — WARFARIN SODIUM 2.5 MG/1
TABLET ORAL
Qty: 135 TABLET | Refills: 1 | Status: SHIPPED | OUTPATIENT
Start: 2022-01-12 | End: 2022-12-12 | Stop reason: SDUPTHER

## 2022-01-31 ENCOUNTER — OFFICE VISIT (OUTPATIENT)
Dept: MEDICAL GROUP | Facility: PHYSICIAN GROUP | Age: 82
End: 2022-01-31
Payer: MEDICARE

## 2022-01-31 VITALS
WEIGHT: 193 LBS | HEIGHT: 64 IN | SYSTOLIC BLOOD PRESSURE: 120 MMHG | DIASTOLIC BLOOD PRESSURE: 62 MMHG | OXYGEN SATURATION: 96 % | BODY MASS INDEX: 32.95 KG/M2 | HEART RATE: 80 BPM | TEMPERATURE: 96.8 F

## 2022-01-31 DIAGNOSIS — L89.303 PRESSURE INJURY OF BUTTOCK, STAGE 3, UNSPECIFIED LATERALITY (HCC): ICD-10-CM

## 2022-01-31 DIAGNOSIS — Z23 NEED FOR VACCINATION: ICD-10-CM

## 2022-01-31 PROBLEM — M54.2 NECK PAIN, ACUTE: Status: RESOLVED | Noted: 2021-08-26 | Resolved: 2022-01-31

## 2022-01-31 PROCEDURE — 99214 OFFICE O/P EST MOD 30 MIN: CPT | Mod: 25 | Performed by: NURSE PRACTITIONER

## 2022-01-31 PROCEDURE — G0008 ADMIN INFLUENZA VIRUS VAC: HCPCS | Performed by: NURSE PRACTITIONER

## 2022-01-31 PROCEDURE — 90662 IIV NO PRSV INCREASED AG IM: CPT | Performed by: NURSE PRACTITIONER

## 2022-01-31 RX ORDER — SOLIFENACIN SUCCINATE 5 MG/1
TABLET, FILM COATED ORAL
COMMUNITY
Start: 2022-01-10 | End: 2022-01-31

## 2022-01-31 ASSESSMENT — FIBROSIS 4 INDEX: FIB4 SCORE: 1.82

## 2022-01-31 NOTE — PROGRESS NOTES
"Subjective:     CC: wound and referral     HPI:   Donte presents today with the following:     Pressure injury of buttock, stage 3 ([facility])  Her last wound care visit was 8/3/2021 and she was discharged as her pressure ulcer had improved.  She is here today as she states she needs another referral as her wound has returned. She is unsure of when the wound returned but can remember the wound present during Thanksgiving. She is a poor historian. She continues to spend most of her day being sedentary with minimal ambulation only to the restroom. She is sitting on a cushion but has pain with sitting. She is incontinent of urine and wears a pad. She does continue to live alone in [facility]. Her daughter calls her daily to check on her. She has not informed her daughter of the wound. Her grandson drove her to today's appointment as she is no longer driving by choice.       Past Medical History:   Diagnosis Date   • A-fib ([facility])    • Anesthesia     \"Tachycardia for 5 days after cataract surgery\"   • Anticoagulant long-term use 1/12/2012   • Arthritis     osteo-Knees, hips   • Atrial fibrillation ([facility])    • Backpain     R hip   • Bowel habit changes     diarrhea   • Breath shortness     with exertion, prn O2 2L   • Bronchitis Nov, 2013   • CAD (coronary artery disease)    • Depression    • Glaucoma 5/3/2011   • Hematoma complicating a procedure 11/3/2012   • Hemorrhagic disorder ([facility])     bruising/coumadin   • Hypertension    • Hypothyroid    • Lupus (HCC)    • Macular degeneration    • Menopause 1/12/2012   • Mitral regurgitation 10/30/2012   • Obesity 1/12/2012   • Pacemaker 2018   • Pneumonia feb,2013   • Pre-syncope 6/29/2018   • Pulmonary hypertension (HCC) 10/30/2012   • PVC's (premature ventricular contractions) 1/12/2012   • Senile nuclear sclerosis    • Spinal stenosis of lumbar region at multiple levels    • Unspecified cataract     repaired bilateral   • Unspecified urinary incontinence    • Urinary bladder " disorder        Social History     Tobacco Use   • Smoking status: Never Smoker   • Smokeless tobacco: Never Used   Vaping Use   • Vaping Use: Never used   Substance Use Topics   • Alcohol use: No   • Drug use: No       Current Outpatient Medications Ordered in Epic   Medication Sig Dispense Refill   • warfarin (COUMADIN) 2.5 MG Tab TAKE ONE TO ONE AND ONE-HALF TABLETS BY MOUTH EVERY DAY AS DIRECTED BY THE [facility]ICOAGULATION [facility] 135 Tablet 1   • sertraline (ZOLOFT) 50 MG Tab TAKE ONE TABLET BY MOUTH EVERY DAY 90 Tablet 2   • ipratropium (ATROVENT) 0.03 % Solution Administer 2 Sprays into affected nostril(S) every 12 hours. 30 mL 1   • estradiol (ESTRACE) 2 MG Tab TAKE ONE TABLET BY MOUTH ONCE DAILY 90 Tablet 1   • Sennosides (SENOKOT PO) Take  by mouth.     • diphenhydrAMINE HCl (BENADRYL ALLERGY PO) Take  by mouth.     • atenolol (TENORMIN) 50 MG Tab Take 1 Tablet by mouth every day. TWICE DAILY 200 Tablet 3   • furosemide (LASIX) 40 MG Tab Take 1 Tablet by mouth every day. 90 Tablet 11   • furosemide (LASIX) 20 MG Tab Take 1 Tablet by mouth every day. 30 Tablet 0   • lisinopril (PRINIVIL) 5 MG Tab TAKE ONE TABLET BY MOUTH EVERY DAY  100 tablet 3   • magnesium oxide (MAG-OX) 400 MG Tab tablet TAKE ONE TABLET BY MOUTH EVERY DAY  90 tablet 3   • Mirabegron ER (MYRBETRIQ) 50 MG TABLET SR 24 HR Take 50 mg by mouth every day. 90 Tab 3   • levothyroxine (SYNTHROID) 50 MCG Tab TAKE 1 TABLET BY MOUTH EVERY MORNING ON A EMPTY STOMACH 90 Tab 3   • Acetaminophen 500 MG Cap Take 2 Caps by mouth 3 times a day as needed. Indications: Pain     • PREBIOTIC PRODUCT PO Take 2 Tabs by mouth every day.     • Lutein 20 MG Cap Take 1 Cap by mouth every day. 90 Cap 3   • vitamin D (CHOLECALCIFEROL) 1000 UNIT TABS Take 2,000 Units by mouth every day.       No current [facility]-ordered facility-administered medications on file.       Allergies:  Amiodarone, Bactrim, Cipro xr, Metoprolol, Morphine, Phytoplex z-guard [petrolatum-zinc  "oxide], Pseudoephedrine, Qvar [beclomethasone dipropionate], Vibramycin, Atorvastatin calcium-polysorbate 80, Augmentin, Diltiazem, Flecainide, Keflex, Mucinex, Tramadol, Atorvastatin, and Tape    Health Maintenance: Due for influenza vaccine today, she is interested.  Due for zoster vaccine and annual wellness visit      Objective:     Vital signs reviewed   Exam:  /62 (BP Location: Right arm, Patient Position: Sitting, BP Cuff Size: Adult)   Pulse 80   Temp 36 °C (96.8 °F) (Temporal)   Ht 1.626 m (5' 4\")   Wt 87.5 kg (193 lb)   LMP 01/01/1993   SpO2 96%   BMI 33.13 kg/m²  Body mass index is 33.13 kg/m².    Gen: Alert and oriented, No apparent distress.  Eyes:   Lids normal. Glasses in place.   Lungs: Normal effort, no audible wheezes.  CV: Skin pink, warm and dry.  Ext: No clubbing, cyanosis, edema. Right buttock near midline with wound approx 1 cm x 0.5 cm in size, no discharge present. Excoriations present to bilateral buttocks.     A chaperone was offered to the patient during today's exam. Chaperone name: Afshan Kaye MA was present.      Assessment & Plan:     81 y.o. female with the following -     1. Pressure injury of buttock, stage 3, unspecified laterality (HCC)  Chronic exacerbated problem.  Discussed the patient that she needs to increase her activity and minimize sedentary lifestyle as this will increase her wound size.  We discussed she will use barrier cream to the area.  Discussed will place referral to wound care.  Discussed changing her pad frequently due to her urinary incontinence.  Return if symptoms worsen.  - Referral to Wound Clinic    2. Need for vaccination   Acute uncomplicated problem.  She would like her influenza vaccine today. I have placed the below orders and discussed them with an approved delegating provider. The MA is performing the below orders under the direction of Dr. Guzman.  - INFLUENZA VACCINE, HIGH DOSE (65+ ONLY)        Return if symptoms worsen or " fail to improve.    Please note that this dictation was created using voice recognition software. I have made every reasonable attempt to correct obvious errors, but I expect that there are errors of grammar and possibly content that I did not discover before finalizing the note.

## 2022-01-31 NOTE — ASSESSMENT & PLAN NOTE
Her last wound care visit was 8/3/2021 and she was discharged as her pressure ulcer had improved.  She is here today as she states she needs another referral as her wound has returned. She is unsure of when the wound returned but can remember the wound present during Thanksgiving. She is a poor historian. She continues to spend most of her day being sedentary with minimal ambulation only to the restroom. She is sitting on a cushion but has pain with sitting. She is incontinent of urine and wears a pad. She does continue to live alone in [facility] [place]. Her daughter calls her daily to check on her. She has not informed her daughter of the wound. Her grandson drove her to today's appointment as she is no longer driving by choice.

## 2022-02-03 ENCOUNTER — HOME HEALTH ADMISSION (OUTPATIENT)
Dept: HOME HEALTH SERVICES | Facility: HOME HEALTHCARE | Age: 82
End: 2022-02-03
Payer: MEDICARE

## 2022-02-03 ENCOUNTER — TELEPHONE (OUTPATIENT)
Dept: MEDICAL GROUP | Facility: PHYSICIAN GROUP | Age: 82
End: 2022-02-03

## 2022-02-03 DIAGNOSIS — L89.303 PRESSURE INJURY OF BUTTOCK, STAGE 3, UNSPECIFIED LATERALITY (HCC): ICD-10-CM

## 2022-02-03 NOTE — TELEPHONE ENCOUNTER
VOICEMAIL  1. Caller Name: Donte [facility]  [phone] (home) [phone] (work)                        Call Back Number:    2. Message: Pt left message that her referral was sent to wound clinic. Donte stated that she does not drive and is requesting the referral be changed to Home Health     3. Patient approves office to leave a detailed voicemail/MyChart message: N\A

## 2022-02-03 NOTE — TELEPHONE ENCOUNTER
Reviewed telephone encounter. Referral placed to home health. [place]

## 2022-02-11 DIAGNOSIS — E03.8 OTHER SPECIFIED HYPOTHYROIDISM: ICD-10-CM

## 2022-02-11 NOTE — TELEPHONE ENCOUNTER
Was the patient seen in the last year in this department? 1/31/22    Does patient have an active prescription for medications requested? No     Received Request Via: Pharmacy    Anticoagulation Monitoring on 01/10/2022   Component Date Value   • INR 01/10/2022 2.70    Anticoagulation Monitoring on 12/02/2021   Component Date Value   • INR 12/02/2021 2.30    Anticoagulation Monitoring on 11/12/2021   Component Date Value   • INR 11/12/2021 2.40    Anticoagulation Monitoring on 11/01/2021   Component Date Value   • INR 10/29/2021 1.40*   Anticoagulation Monitoring on 10/15/2021   Component Date Value   • INR 10/01/2021 2.30    • INR 10/15/2021 1.70*   Anticoagulation Monitoring on 09/17/2021   Component Date Value   • INR 09/17/2021 2.60    Anticoagulation Monitoring on 09/03/2021   Component Date Value   • INR 09/03/2021 2.30    Anticoagulation Monitoring on 08/13/2021   Component Date Value   • INR 08/13/2021 1.80*   Hospital Outpatient Visit on 07/27/2021   Component Date Value   • PT 07/27/2021 21.5*   • INR 07/27/2021 1.93*   Anticoagulation Monitoring on 07/23/2021   Component Date Value   • INR 07/23/2021 2.10    There may be more visits with results that are not included.

## 2022-02-13 RX ORDER — LEVOTHYROXINE SODIUM 0.05 MG/1
TABLET ORAL
Qty: 90 TABLET | Refills: 0 | Status: SHIPPED | OUTPATIENT
Start: 2022-02-13 | End: 2022-04-25

## 2022-02-14 ENCOUNTER — DOCUMENTATION (OUTPATIENT)
Dept: VASCULAR LAB | Facility: MEDICAL CENTER | Age: 82
End: 2022-02-14

## 2022-02-14 NOTE — TELEPHONE ENCOUNTER
Requested Prescriptions     Signed Prescriptions Disp Refills   • levothyroxine (SYNTHROID) 50 MCG Tab 90 Tablet 0     Sig: TAKE ONE TABLET BY MOUTH EVERY MORNING ON EMPTY STOMACH     Authorizing Provider: NORBERT PARADA A.P.R.N.

## 2022-02-14 NOTE — PROGRESS NOTES
[facility]    Called Donte to remind her that she was overdue for an INR check. She states she will test today. Pharmacy team to follow-up when the reading returns.     [place] Grande, PeterD

## 2022-02-15 ENCOUNTER — ANTICOAGULATION MONITORING (OUTPATIENT)
Dept: VASCULAR LAB | Facility: MEDICAL CENTER | Age: 82
End: 2022-02-15

## 2022-02-15 DIAGNOSIS — Z79.01 CHRONIC ANTICOAGULATION: ICD-10-CM

## 2022-02-15 LAB — INR PPP: 2.7 (ref 2–3.5)

## 2022-02-16 NOTE — PROGRESS NOTES
OP Anticoagulation Service Note    Date: 2/15/2022    Anticoagulation Summary  As of 2/15/2022    INR goal:  2.0-3.0   TTR:  73.3 % (6.6 y)   INR used for dosin.70 (2/15/2022)   Warfarin maintenance plan:  3.75 mg (2.5 mg x 1.5) every Mon; 2.5 mg (2.5 mg x 1) all other days   Weekly warfarin total:  18.75 mg   Plan last modified:  Patti Dinero, PharmD (2021)   Next INR check:  3/15/2022   Target end date:  Indefinite    Indications    Atrial fibrillation (HCC) (Resolved) [I48.91]  Chronic anticoagulation [Z79.01]             Anticoagulation Episode Summary     INR check location:  Home Draw    Preferred lab:      Send INR reminders to:      Comments:  Winston YEAGER      Anticoagulation Care Providers     Provider Role Specialty Phone number    Lizzy Haywood M.D. Referring Cardiovascular Disease (Cardiology) [phone]    [facility] Anticoagulation Services Responsible  [phone]    Peter HollowayD Responsible Pharmacy         Anticoagulation Patient Findings      Voice message for patient regarding their anticoagulant.     HPI:   The reason for today's call is to prevent morbidity and mortality from a blood clot and/or stroke and to reduce the risk of bleeding while on a anticoagulant.     PCP:  Rosi Mendoza A.P.R.NLeslye  [address] [id number]    Assessment:     • INR  therapeutic.       Current Outpatient Medications:   •  levothyroxine, TAKE ONE TABLET BY MOUTH EVERY MORNING ON EMPTY STOMACH  •  warfarin, TAKE ONE TO ONE AND ONE-HALF TABLETS BY MOUTH EVERY DAY AS DIRECTED BY THE [facility] ANTICOAGULATION CLINIC  •  sertraline, TAKE ONE TABLET BY MOUTH EVERY DAY  •  ipratropium, 2 Spray, Nasal, Q12HRS  •  estradiol, TAKE ONE TABLET BY MOUTH ONCE DAILY  •  Sennosides (SENOKOT PO), Take  by mouth.  •  diphenhydrAMINE HCl (BENADRYL ALLERGY PO), Take  by mouth.  •  atenolol, 50 mg, Oral, DAILY  •  furosemide, 40 mg, Oral, DAILY  •  furosemide, 20 mg, Oral, DAILY  •  lisinopril,  TAKE ONE TABLET BY MOUTH EVERY DAY   •  magnesium oxide, TAKE ONE TABLET BY MOUTH EVERY DAY   •  Myrbetriq, 50 mg, Oral, DAILY  •  Acetaminophen, 2 Capsule, Oral, TID PRN  •  PREBIOTIC PRODUCT PO, 2 Tablet, Oral, DAILY  •  Lutein, 1 Capsule, Oral, DAILY  •  vitamin D, 2,000 Units, Oral, DAILY      Plan:     • Continue the same warfarin dose, as noted above.       Follow-up:     • Our protocol suggests we test in 4 weeks.        Additional information discussed with patient:     • Asked patient to please call the anticoagulation clinic if they have any signs/symptoms of bleeding and/or thrombosis or any changes to diet or medications.      National recommendations regarding anticoagulation therapy:     The CHEST guidelines recommends frequent INR monitoring at regular intervals (a few days up to a max of 12 weeks) to ensure patients are on the proper dose of warfarin, and patients are not having any complications from therapy.  INRs can dramatically change over a short time period due to diet, medications, and medical conditions.     [facility]  Phone [phone] fax [phone]    This note was created using voice recognition software (Dragon). The accuracy of the dictation is limited by the abilities of the software. I have reviewed the note prior to signing, however some errors in grammar and context are still possible. If you have any questions related to this note please do not hesitate to contact our office.

## 2022-03-31 ENCOUNTER — TELEPHONE (OUTPATIENT)
Dept: VASCULAR LAB | Facility: MEDICAL CENTER | Age: 82
End: 2022-03-31
Payer: MEDICARE

## 2022-04-11 ENCOUNTER — APPOINTMENT (RX ONLY)
Dept: URBAN - METROPOLITAN AREA CLINIC 6 | Facility: CLINIC | Age: 82
Setting detail: DERMATOLOGY
End: 2022-04-11

## 2022-04-11 DIAGNOSIS — L57.0 ACTINIC KERATOSIS: ICD-10-CM

## 2022-04-11 DIAGNOSIS — Z71.89 OTHER SPECIFIED COUNSELING: ICD-10-CM

## 2022-04-11 DIAGNOSIS — L82.0 INFLAMED SEBORRHEIC KERATOSIS: ICD-10-CM

## 2022-04-11 DIAGNOSIS — L81.4 OTHER MELANIN HYPERPIGMENTATION: ICD-10-CM

## 2022-04-11 DIAGNOSIS — L82.1 OTHER SEBORRHEIC KERATOSIS: ICD-10-CM

## 2022-04-11 DIAGNOSIS — D22 MELANOCYTIC NEVI: ICD-10-CM

## 2022-04-11 DIAGNOSIS — Z85.828 PERSONAL HISTORY OF OTHER MALIGNANT NEOPLASM OF SKIN: ICD-10-CM

## 2022-04-11 DIAGNOSIS — D18.0 HEMANGIOMA: ICD-10-CM

## 2022-04-11 DIAGNOSIS — L30.4 ERYTHEMA INTERTRIGO: ICD-10-CM

## 2022-04-11 DIAGNOSIS — L57.8 OTHER SKIN CHANGES DUE TO CHRONIC EXPOSURE TO NONIONIZING RADIATION: ICD-10-CM

## 2022-04-11 PROBLEM — D18.01 HEMANGIOMA OF SKIN AND SUBCUTANEOUS TISSUE: Status: ACTIVE | Noted: 2022-04-11

## 2022-04-11 PROBLEM — D22.62 MELANOCYTIC NEVI OF LEFT UPPER LIMB, INCLUDING SHOULDER: Status: ACTIVE | Noted: 2022-04-11

## 2022-04-11 PROBLEM — D22.5 MELANOCYTIC NEVI OF TRUNK: Status: ACTIVE | Noted: 2022-04-11

## 2022-04-11 PROBLEM — D48.5 NEOPLASM OF UNCERTAIN BEHAVIOR OF SKIN: Status: ACTIVE | Noted: 2022-04-11

## 2022-04-11 PROBLEM — D22.72 MELANOCYTIC NEVI OF LEFT LOWER LIMB, INCLUDING HIP: Status: ACTIVE | Noted: 2022-04-11

## 2022-04-11 PROBLEM — D22.61 MELANOCYTIC NEVI OF RIGHT UPPER LIMB, INCLUDING SHOULDER: Status: ACTIVE | Noted: 2022-04-11

## 2022-04-11 PROBLEM — D22.71 MELANOCYTIC NEVI OF RIGHT LOWER LIMB, INCLUDING HIP: Status: ACTIVE | Noted: 2022-04-11

## 2022-04-11 PROCEDURE — 17000 DESTRUCT PREMALG LESION: CPT | Mod: 59

## 2022-04-11 PROCEDURE — 11102 TANGNTL BX SKIN SINGLE LES: CPT | Mod: 59

## 2022-04-11 PROCEDURE — ? OTC TREATMENT REGIMEN

## 2022-04-11 PROCEDURE — ? PHOTODYNAMIC THERAPY COUNSELING

## 2022-04-11 PROCEDURE — 17110 DESTRUCTION B9 LES UP TO 14: CPT

## 2022-04-11 PROCEDURE — ? BIOPSY BY SHAVE METHOD

## 2022-04-11 PROCEDURE — 99203 OFFICE O/P NEW LOW 30 MIN: CPT | Mod: 25

## 2022-04-11 PROCEDURE — ? ADDITIONAL NOTES

## 2022-04-11 PROCEDURE — ? COUNSELING

## 2022-04-11 PROCEDURE — 11103 TANGNTL BX SKIN EA SEP/ADDL: CPT | Mod: 59

## 2022-04-11 PROCEDURE — 17003 DESTRUCT PREMALG LES 2-14: CPT | Mod: 59

## 2022-04-11 PROCEDURE — ? LIQUID NITROGEN

## 2022-04-11 ASSESSMENT — LOCATION SIMPLE DESCRIPTION DERM
LOCATION SIMPLE: RIGHT SHOULDER
LOCATION SIMPLE: LEFT THIGH
LOCATION SIMPLE: LEFT CALF
LOCATION SIMPLE: LEFT FOREARM
LOCATION SIMPLE: RIGHT UPPER ARM
LOCATION SIMPLE: RIGHT KNEE
LOCATION SIMPLE: ABDOMEN
LOCATION SIMPLE: RIGHT PRETIBIAL REGION
LOCATION SIMPLE: LEFT PRETIBIAL REGION
LOCATION SIMPLE: LEFT FOREHEAD
LOCATION SIMPLE: LEFT KNEE
LOCATION SIMPLE: RIGHT THIGH
LOCATION SIMPLE: RIGHT FOREARM
LOCATION SIMPLE: LEFT UPPER ARM
LOCATION SIMPLE: LEFT CHEEK
LOCATION SIMPLE: CHEST

## 2022-04-11 ASSESSMENT — LOCATION DETAILED DESCRIPTION DERM
LOCATION DETAILED: LEFT KNEE
LOCATION DETAILED: RIGHT ANTERIOR DISTAL THIGH
LOCATION DETAILED: LEFT ANTERIOR DISTAL THIGH
LOCATION DETAILED: LEFT VENTRAL PROXIMAL FOREARM
LOCATION DETAILED: RIGHT KNEE
LOCATION DETAILED: LEFT ANTERIOR PROXIMAL UPPER ARM
LOCATION DETAILED: RIGHT ANTERIOR DISTAL UPPER ARM
LOCATION DETAILED: LEFT PROXIMAL PRETIBIAL REGION
LOCATION DETAILED: EPIGASTRIC SKIN
LOCATION DETAILED: LOWER STERNUM
LOCATION DETAILED: RIGHT ANTERIOR PROXIMAL UPPER ARM
LOCATION DETAILED: LEFT DISTAL CALF
LOCATION DETAILED: LEFT INFERIOR CENTRAL MALAR CHEEK
LOCATION DETAILED: LEFT RIB CAGE
LOCATION DETAILED: RIGHT POSTERIOR SHOULDER
LOCATION DETAILED: LEFT INFERIOR LATERAL FOREHEAD
LOCATION DETAILED: PERIUMBILICAL SKIN
LOCATION DETAILED: RIGHT VENTRAL PROXIMAL FOREARM
LOCATION DETAILED: RIGHT PROXIMAL PRETIBIAL REGION
LOCATION DETAILED: LEFT ANTERIOR DISTAL UPPER ARM
LOCATION DETAILED: RIGHT ANTECUBITAL SKIN

## 2022-04-11 ASSESSMENT — LOCATION ZONE DERM
LOCATION ZONE: ARM
LOCATION ZONE: LEG
LOCATION ZONE: TRUNK
LOCATION ZONE: FACE

## 2022-04-11 NOTE — PROCEDURE: ADDITIONAL NOTES
Detail Level: Simple
Additional Notes: Discussed PDT treatment to the face with patient. She will think about it and schedule if desired. Information handout provided.
Render Risk Assessment In Note?: no

## 2022-04-11 NOTE — PROCEDURE: OTC TREATMENT REGIMEN
Patient Specific Otc Recommendations (Will Not Stick From Patient To Patient): I recommended an OTC Lotrimin lotion.
Detail Level: Zone

## 2022-04-11 NOTE — PROCEDURE: LIQUID NITROGEN
Number Of Freeze-Thaw Cycles: 3 freeze-thaw cycles
Application Tool (Optional): Liquid Nitrogen Sprayer
Post-Care Instructions: I reviewed with the patient in detail post-care instructions. Patient is to wear sunprotection, and avoid picking at any of the treated lesions. Pt may apply Vaseline to crusted or scabbing areas.
Render Post-Care Instructions In Note?: no
Detail Level: Detailed
Show Aperture Variable?: Yes
Consent: The patient's consent was obtained including but not limited to risks of crusting, scabbing, blistering, scarring, darker or lighter pigmentary change, recurrence, incomplete removal and infection.
Duration Of Freeze Thaw-Cycle (Seconds): 3
Spray Paint Text: The liquid nitrogen was applied to the skin utilizing a spray paint frosting technique.
Medical Necessity Clause: This procedure was medically necessary because the lesions that were treated were:
Medical Necessity Information: It is in your best interest to select a reason for this procedure from the list below. All of these items fulfill various CMS LCD requirements except the new and changing color options.

## 2022-04-18 ENCOUNTER — TELEPHONE (OUTPATIENT)
Dept: MEDICAL GROUP | Facility: PHYSICIAN GROUP | Age: 82
End: 2022-04-18
Payer: MEDICARE

## 2022-04-18 NOTE — TELEPHONE ENCOUNTER
Future Appointments       Provider Department Center    4/25/2022 12:30 PM VICTORIANO Montes [facility]        ANNUAL WELLNESS VISIT PRE-VISIT PLANNING    1.  Reviewed notes from the last office visit: Yes, LOV 01/31/22    2.  If any orders were ordered or intended to be done prior to visit (i.e. 6 mos follow-up), do we have results/consult notes or has patient scheduled?        •  Labs - Labs were not ordered at last office visit.  Note: If patient appointment is for lab review and patient did not complete labs, check with provider if OK to reschedule patient until labs completed.       •  Imaging - Imaging was not ordered at last office visit.       •  Referrals - Referral ordered, patient was seen and consult notes are in chart. Care Teams updated  NO. The referral was for Wound Care    3.  Immunizations were updated in Epic using Reconcile Outside Information activity? Yes       •  Is patient due for Tdap? NO       •  Is patient due for Shingrix? NO    4.  Patient is due for the following Health Maintenance Topics:   Health Maintenance Due   Topic Date Due   • IMM ZOSTER VACCINES (1 of 2) Never done   • Annual Wellness Visit  12/06/2019       - Patient declines Immunizations: SHINGRIX (Shingles).    5.  Reviewed/Updated the following with patient:       •   Preferred Pharmacy? Yes       •   Preferred Lab? Yes       •   Preferred Communication? Yes       •   Allergies? Yes       •   Medications? YES. Was Abstract Encounter opened and chart updated? YES       •   Social History? Yes       •   Family History (document living status of immediate family members and if + hx of  cancer, diabetes, hypertension, hyperlipidemia, heart attack, stroke) Yes    6.  Care Team Updated:       •   DME Company (gait device, O2, CPAP, etc.): N\A       •   Other Specialists (eye doctor, derm, GYN, cardiology, endo, etc): N\A    7.  Patient was advised: “This is a free wellness visit. The provider  will screen for medical conditions to help you stay healthy. If you have other concerns to address you may be asked to discuss these at a separate visit or there may be an additional fee.”     8.  AHA (Puls8) form printed for Provider? N/A   Pt advised to arrive 15 minutes prior to scheduled appt.

## 2022-04-21 ENCOUNTER — TELEPHONE (OUTPATIENT)
Dept: VASCULAR LAB | Facility: MEDICAL CENTER | Age: 82
End: 2022-04-21
Payer: MEDICARE

## 2022-04-21 NOTE — LETTER
Donte Magaña  [address] [place] [address]    04/21/22    Dear Donte Magaña ,    We have been unsuccessful in our attempts to contact you regarding your Anticoagulation Service appointments. Warfarin is a potent blood-thinning agent that requires monitoring to ensure that the dosage is correct for your body.  If it isn't, you could develop serious, sometimes life-threatening bleeding problems or life-threatening blood clots or stroke could result.    To monitor you effectively, we need to be able to communicate with you.  This is a requirement to be followed by our Service.       If you repeatedly fail to keep your lab appointments, you are at risk of being discharged from the Anticoagulation Service.    It is extremely important that you contact the clinic as soon as possible to arrange appropriate follow up.  We are open Monday-Friday 8 am until 5 pm.  You may reach our Service at [phone].           Sincerely,           Juan Blanco, PharmD, [facility]  Clinic Supervisor  [facility]  Outpatient Anticoagulation Service

## 2022-04-21 NOTE — TELEPHONE ENCOUNTER
Left message for pt to have INR checked  2nd call  Letter sent  Massiel Coleman, Clinical Pharmacist, CDE, CACP

## 2022-04-23 DIAGNOSIS — E03.8 OTHER SPECIFIED HYPOTHYROIDISM: ICD-10-CM

## 2022-04-25 ENCOUNTER — OFFICE VISIT (OUTPATIENT)
Dept: MEDICAL GROUP | Facility: PHYSICIAN GROUP | Age: 82
End: 2022-04-25
Payer: MEDICARE

## 2022-04-25 VITALS
HEIGHT: 64 IN | TEMPERATURE: 97 F | HEART RATE: 88 BPM | OXYGEN SATURATION: 96 % | BODY MASS INDEX: 32.44 KG/M2 | SYSTOLIC BLOOD PRESSURE: 114 MMHG | DIASTOLIC BLOOD PRESSURE: 68 MMHG | WEIGHT: 190 LBS

## 2022-04-25 DIAGNOSIS — M32.9 PERSONAL HISTORY OF SYSTEMIC LUPUS ERYTHEMATOSUS (SLE) (HCC): ICD-10-CM

## 2022-04-25 DIAGNOSIS — H91.93 DECREASED HEARING, BILATERAL: ICD-10-CM

## 2022-04-25 DIAGNOSIS — Z00.00 MEDICARE ANNUAL WELLNESS VISIT, SUBSEQUENT: Primary | ICD-10-CM

## 2022-04-25 DIAGNOSIS — I73.9 PVD (PERIPHERAL VASCULAR DISEASE) (HCC): ICD-10-CM

## 2022-04-25 DIAGNOSIS — N18.31 STAGE 3A CHRONIC KIDNEY DISEASE: ICD-10-CM

## 2022-04-25 DIAGNOSIS — I48.19 PERSISTENT ATRIAL FIBRILLATION (HCC): ICD-10-CM

## 2022-04-25 DIAGNOSIS — Z13.0 SCREENING FOR ENDOCRINE, METABOLIC AND IMMUNITY DISORDER: ICD-10-CM

## 2022-04-25 DIAGNOSIS — Z13.220 SCREENING FOR LIPID DISORDERS: ICD-10-CM

## 2022-04-25 DIAGNOSIS — Z13.228 SCREENING FOR ENDOCRINE, METABOLIC AND IMMUNITY DISORDER: ICD-10-CM

## 2022-04-25 DIAGNOSIS — L89.303 PRESSURE INJURY OF BUTTOCK, STAGE 3, UNSPECIFIED LATERALITY (HCC): ICD-10-CM

## 2022-04-25 DIAGNOSIS — Z13.29 SCREENING FOR ENDOCRINE, METABOLIC AND IMMUNITY DISORDER: ICD-10-CM

## 2022-04-25 DIAGNOSIS — Z11.1 SCREENING-PULMONARY TB: ICD-10-CM

## 2022-04-25 DIAGNOSIS — Z79.01 CHRONIC ANTICOAGULATION: ICD-10-CM

## 2022-04-25 DIAGNOSIS — F33.0 MILD EPISODE OF RECURRENT MAJOR DEPRESSIVE DISORDER (HCC): ICD-10-CM

## 2022-04-25 DIAGNOSIS — E03.9 ACQUIRED HYPOTHYROIDISM: ICD-10-CM

## 2022-04-25 PROCEDURE — G0439 PPPS, SUBSEQ VISIT: HCPCS | Performed by: NURSE PRACTITIONER

## 2022-04-25 RX ORDER — LEVOTHYROXINE SODIUM 0.05 MG/1
TABLET ORAL
Qty: 30 TABLET | Refills: 1 | Status: SHIPPED | OUTPATIENT
Start: 2022-04-25 | End: 2022-09-06 | Stop reason: SDUPTHER

## 2022-04-25 ASSESSMENT — ACTIVITIES OF DAILY LIVING (ADL): BATHING_REQUIRES_ASSISTANCE: 0

## 2022-04-25 ASSESSMENT — FIBROSIS 4 INDEX: FIB4 SCORE: 1.82

## 2022-04-25 ASSESSMENT — PATIENT HEALTH QUESTIONNAIRE - PHQ9: CLINICAL INTERPRETATION OF PHQ2 SCORE: 0

## 2022-04-25 ASSESSMENT — ENCOUNTER SYMPTOMS: GENERAL WELL-BEING: GOOD

## 2022-04-25 NOTE — ASSESSMENT & PLAN NOTE
Diagnosed 2020 by podiatry. No acute complaints today. Blood pressure is controlled. Currently on warfarin, lisinopril and atenolol.

## 2022-04-25 NOTE — ASSESSMENT & PLAN NOTE
She is not in any medication at this time.  Reports no new symptoms.  Reports she has history of lupus.

## 2022-04-25 NOTE — ASSESSMENT & PLAN NOTE
She was previously getting home health with [facility].  Her daughter patient was informed that [facility] does not serve her area out in [facility].  Patient's daughter is asking for referral back to wound clinic because she cannot drive her to appointments at the wound clinic.

## 2022-04-25 NOTE — PROGRESS NOTES
Chief Complaint   Patient presents with   • Annual Wellness Visit       HPI:  Donte Magaña is a 81 y.o. here for Medicare Annual Wellness Visit with her daughter Reina for the following:     Mild episode of recurrent major depressive disorder (HCC)  Her PHQ score today is 0. She continues with sertraline 50 mg daily. She denies any self harm or SI today. Patient states that she will be moving to an assisted living [place] [facility].     Persistent atrial fibrillation (HCC)  She continues with atenolol 50 mg daily and warfarin. INR from February was therapeutic.  She is followed by the anticoagulation clinic.  She continues follow-up with cardiology.  Due for scheduled appoint with cardiology.  Patient informed to schedule.    Pressure injury of buttock, stage 3 ([facility])  She was previously getting home health with [facility].  Her daughter patient was informed that [facility] does not serve her area out in [facility].  Patient's daughter is asking for referral back to wound clinic because she cannot drive her to appointments at the wound clinic.    Personal history of systemic lupus erythematosus (SLE) ([facility])  She is not in any medication at this time.  Reports no new symptoms.  Reports she has history of lupus.     Stage 3a chronic kidney disease  Blood pressure is at goal today.  She does not take any NSAIDs.  She is on lisinopril and furosemide.  She is due for updated labs.    Hypothyroidism  She continues with levothyroxine 50 mcg daily and empty stomach.  She is due for updated labs.    Chronic anticoagulation  She is on warfarin therapy for chronic atrial fibrillation.  Last INR was therapeutic.    PVD (peripheral vascular disease) ([facility])  Diagnosed 2020 by podiatry. No acute complaints today. Blood pressure is controlled. Currently on warfarin, lisinopril and atenolol.         Patient Active Problem List    Diagnosis Date Noted   • PVD (peripheral vascular disease) ([facility]) 04/19/2021   • Falls,  initial encounter 01/29/2021   • High risk medication use 05/01/2020   • Essential hypertension 03/02/2020   • Stage 3a chronic kidney disease ([facility]) 03/02/2020   • Obesity (BMI 30-39.9) 03/02/2020   • Urinary incontinence 12/31/2019   • Pressure injury of buttock, stage 3 ([facility]) 12/31/2019   • Cysts of both ovaries 10/30/2019   • Peripheral edema 02/13/2019   • Multiple drug allergies 06/29/2018   • Bilateral leg edema 06/29/2018   • Persistent atrial fibrillation ([facility]) 10/31/2017   • Cardiac pacemaker in situ 10/17/2016   • Degenerative arthritis of knee, bilateral 06/23/2016   • Mild episode of recurrent major depressive disorder ([facility]) 07/30/2015   • Coronary artery disease involving native coronary artery of native heart 10/07/2014   • Hypothyroidism 05/21/2014   • Chronic anticoagulation 11/14/2013   • 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/02/2013   • Spinal stenosis, multilevel 07/02/2013   • Glaucoma 05/03/2011   • Macular degeneration 05/03/2011   • Personal history of systemic lupus erythematosus (SLE) ([facility]) 10/23/2009       Current Outpatient Medications   Medication Sig Dispense Refill   • levothyroxine (SYNTHROID) 50 MCG Tab TAKE ONE TABLET BY MOUTH EVERY MORNING ON EMPTY STOMACH 30 Tablet 1   • warfarin (COUMADIN) 2.5 MG Tab TAKE ONE TO ONE AND ONE-HALF TABLETS BY MOUTH EVERY DAY AS DIRECTED BY THE [facility] ANTICOAGULATION CLINIC 135 Tablet 1   • sertraline (ZOLOFT) 50 MG Tab TAKE ONE TABLET BY MOUTH EVERY DAY 90 Tablet 2   • ipratropium (ATROVENT) 0.03 % Solution Administer 2 Sprays into affected nostril(S) every 12 hours. 30 mL 1   • estradiol (ESTRACE) 2 MG Tab TAKE ONE TABLET BY MOUTH ONCE DAILY 90 Tablet 1   • Sennosides (SENOKOT PO) Take  by mouth.     • diphenhydrAMINE HCl (BENADRYL ALLERGY PO) Take  by mouth.     • atenolol (TENORMIN) 50 MG Tab Take 1 Tablet by mouth every day. TWICE DAILY 200 Tablet 3   • furosemide (LASIX) 40 MG Tab Take 1 Tablet by mouth every day. 90 Tablet 11   • furosemide  (LASIX) 20 MG Tab Take 1 Tablet by mouth every day. 30 Tablet 0   • lisinopril (PRINIVIL) 5 MG Tab TAKE ONE TABLET BY MOUTH EVERY DAY  100 tablet 3   • magnesium oxide (MAG-OX) 400 MG Tab tablet TAKE ONE TABLET BY MOUTH EVERY DAY  90 tablet 3   • Mirabegron ER (MYRBETRIQ) 50 MG TABLET SR 24 HR Take 50 mg by mouth every day. 90 Tab 3   • Acetaminophen 500 MG Cap Take 2 Caps by mouth 3 times a day as needed. Indications: Pain     • PREBIOTIC PRODUCT PO Take 2 Tabs by mouth every day.     • Lutein 20 MG Cap Take 1 Cap by mouth every day. 90 Cap 3   • vitamin D (CHOLECALCIFEROL) 1000 UNIT TABS Take 2,000 Units by mouth every day.       No current facility-administered medications for this visit.          Current supplements as per medication list.     Allergies: Amiodarone, Bactrim, Cipro xr, Metoprolol, Morphine, Phytoplex z-guard [petrolatum-zinc oxide], Pseudoephedrine, Qvar [beclomethasone dipropionate], Vibramycin, Atorvastatin calcium-polysorbate 80, Augmentin, Diltiazem, Flecainide, Keflex, Mucinex, Tramadol, Atorvastatin, and Tape    Current social contact/activities: Family activities, casinos     She  reports that she has never smoked. She has never used smokeless tobacco. She reports that she does not drink alcohol and does not use drugs.  Counseling given: Not Answered      DPA/Advanced Directive:  Patient has Advanced Directive, but it is not on file. Instructed to bring in a copy to scan into their chart.    ROS:    Gait: Uses a walker  Ostomy: No  Other tubes: No  Amputations: No  Chronic oxygen use: No  Last eye exam: March 2021, she does not have an appointment scheduled.   Wears hearing aids: No   : Reports urinary leakage during the last 6 months that has not interfered at all with their daily activities or sleep.    Screening:  Dicussed and reviewed     Depression Screening  Little interest or pleasure in doing things?  0 - not at all  Feeling down, depressed , or hopeless? 0 - not at all  Patient  Health Questionnaire Score: 0     If depressive symptoms identified deferred to follow up visit unless specifically addressed in assessment and plan.    Interpretation of PHQ-9 Total Score   Score Severity   1-4 No Depression   5-9 Mild Depression   10-14 Moderate Depression   15-19 Moderately Severe Depression   20-27 Severe Depression    Screening for Cognitive Impairment  Three Minute Recall (daughter, heaven, mountain) 2/3    Dexter clock face with all 12 numbers and set the hands to show 10 past 11.  Yes    Cognitive concerns identified deferred for follow up unless specifically addressed in assessment and plan.    Fall Risk Assessment  Has the patient had two or more falls in the last year or any fall with injury in the last year?  No    Safety Assessment  Throw rugs on floor.  Yes  Handrails on all stairs.  Yes  Good lighting in all hallways.  Yes  Difficulty hearing.  No  Patient counseled about all safety risks that were identified.    Functional Assessment ADLs  Are there any barriers preventing you from cooking for yourself or meeting nutritional needs?  No.    Are there any barriers preventing you from driving safely or obtaining transportation?  No.   she is not currently driving. Daughter is driving her, patient now has state ID only.   Are there any barriers preventing you from using a telephone or calling for help?  No.    Are there any barriers preventing you from shopping?  No.    Are there any barriers preventing you from taking care of your own finances?  No.    Are there any barriers preventing you from managing your medications?  No.    Are there any barriers preventing you from showering, bathing or dressing yourself?  No.    Are you currently engaging in any exercise or physical activity?  No.     What is your perception of your health?  Good.      Health Maintenance Summary          Overdue - IMM ZOSTER VACCINES (1 of 2) Overdue - never done    No completion history exists for this topic.           IMM DTaP/Tdap/Td Vaccine (2 - Td or Tdap) Next due on 5/15/2022    05/15/2012  Imm Admin: Tdap Vaccine    10/16/2008  Imm Admin: TD Vaccine          BONE DENSITY (Every 5 Years) Tentatively due on 8/9/2022 08/09/2017  DS-BONE DENSITY STUDY (DEXA)    05/11/2015  DS-BONE DENSITY STUDY (DEXA)    04/04/2013  DS-BONE DENSITY STUDY (DEXA)          Annual Wellness Visit (Every 366 Days) Next due on 4/26/2023 04/25/2022  Visit Dx: Medicare annual wellness visit, subsequent    04/25/2022  Level of Service: ANNUAL WELLNESS VISIT-INCLUDES PPPS SUBSEQUE*    12/05/2018  Done - Next Due 12/06/2019          IMM PNEUMOCOCCAL VACCINE: 65+ Years (Series Information) Completed    11/03/2015  Imm Admin: Pneumococcal Conjugate Vaccine (Prevnar/PCV-13)    10/15/2009  Imm Admin: Pneumococcal polysaccharide vaccine (PPSV-23)    11/05/2002  Imm Admin: Pneumococcal polysaccharide vaccine (PPSV-23)          IMM INFLUENZA (Series Information) Completed    01/31/2022  Imm Admin: Influenza Vaccine Adult HD    10/05/2020  Imm Admin: Influenza Vaccine Adult HD    10/19/2019  Imm Admin: Influenza Vaccine Adult HD    10/12/2018  Imm Admin: Influenza Vaccine Adult HD    09/17/2017  Imm Admin: Influenza Vaccine Adult HD    Only the first 5 history entries have been loaded, but more history exists.          IMM HEP B VACCINE (Series Information) Aged Out    No completion history exists for this topic.          IMM MENINGOCOCCAL VACCINE (MCV4) (Series Information) Aged Out    No completion history exists for this topic.          Discontinued - MAMMOGRAM  Discontinued    02/05/2018  MA-MAMMO SCREENING BILAT W/NEL W/CAD    12/07/2016  MA-MAMMO SCREENING BILAT W/NEL W/CAD    11/12/2015  MA-SCREEN MAMMO W/CAD-BILAT    11/10/2014  MA-SCREENING MAMMOGRAM W/ CAD    10/14/2013  MA-SCREENING MAMMOGRAM W/ CAD    Only the first 5 history entries have been loaded, but more history exists.          Discontinued - COLORECTAL CANCER SCREENING   Discontinued    09/29/2005  AMB REFERRAL TO GI FOR COLONOSCOPY          Discontinued - COVID-19 Vaccine  Discontinued    No completion history exists for this topic.                Patient Care Team:  VICTORIANO Montes as PCP - General (Nurse Practitioner)  VICTORIANO Montes as PCP - [facility] Paneled  [facility]ticoagulation [facility]  Dmitry Bejarano M.D. as Consulting Physician (Ophthalmology)  Rad Clement M.D. as Consulting Physician (Neurosurgery)  Kishore Richards M.D. (Cardiovascular Disease (Cardiology))  Lety Weems, Med Ass't (Inactive)  Waqas Robin M.D. (Obstetrics & Gynecology)        Social History     Tobacco Use   • Smoking status: Never Smoker   • Smokeless tobacco: Never Used   Vaping Use   • Vaping Use: Never used   Substance Use Topics   • Alcohol use: No   • Drug use: No     Family History   Problem Relation Age of Onset   • Stroke Mother    • Diabetes Father    • Stroke Sister    • Heart Disease Brother    • Stroke Sister    • GI Disease Daughter         Crohn's Disease   • Heart Disease Daughter         CHF   • Other Daughter         Chronic Pain--Lymphedema   • Cancer Paternal Aunt         breast     She  has a past medical history of A-fib (HCC), Anesthesia, Anticoagulant long-term use (1/12/2012), Arthritis, Atrial fibrillation ([facility]), Backpain, Bowel habit changes, Breath shortness, Bronchitis (Nov, 2013), CAD (coronary artery disease), Depression, Glaucoma (5/3/2011), Hematoma complicating a procedure (11/3/2012), Hemorrhagic disorder ([facility]), Hypertension, Hypothyroid, Lupus ([facility]), Macular degeneration, Menopause (1/12/2012), Mitral regurgitation (10/30/2012), Obesity (1/12/2012), Pacemaker (2018), Pneumonia (feb,2013), Pre-syncope (6/29/2018), Pulmonary hypertension (HCC) (10/30/2012), PVC's (premature ventricular contractions) (1/12/2012), Senile nuclear sclerosis, Spinal stenosis of lumbar region at multiple levels, Unspecified cataract, Unspecified  urinary incontinence, and Urinary bladder disorder.   Past Surgical History:   Procedure Laterality Date   • ID COMBINED ANT/POST COLPORRHAPHY  1/14/2020    Procedure: COLPORRHAPHY, COMBINED ANTEROPOSTERIOR - PERINEOPLASTY;  Surgeon: Waqas Robin M.D.;  Location: SURGERY SAME DAY [facility];  Service: Gynecology   • ID LAP,DIAGNOSTIC ABDOMEN  1/14/2020    Procedure: PELVISCOPY;  Surgeon: Waqas Robin M.D.;  Location: SURGERY SAME DAY [facility];  Service: Gynecology   • ENTEROCELE REPAIR  1/14/2020    Procedure: REPAIR, ENTEROCELE;  Surgeon: Waqas Robin M.D.;  Location: SURGERY SAME DAY [facility];  Service: Gynecology   • BLADDER SLING FEMALE  1/14/2020    Procedure: BLADDER SLING, FEMALE - TOT;  Surgeon: Waqas Robin M.D.;  Location: SURGERY SAME DAY [facility];  Service: Gynecology   • VAGINAL SUSPENSION  1/14/2020    Procedure: COLPOPEXY - SACROSPINOUS VAULT SUSPENSION;  Surgeon: Waqas Robin M.D.;  Location: SURGERY SAME DAY [facility];  Service: Gynecology   • SALPINGO OOPHORECTOMY Bilateral 1/14/2020    Procedure: SALPINGO-OOPHORECTOMY;  Surgeon: Waqas Robin M.D.;  Location: SURGERY SAME DAY [facility];  Service: Gynecology   • IRRIGATION & DEBRIDEMENT ORTHO Right 2/14/2019    Procedure: IRRIGATION & DEBRIDEMENT ORTHO-HIP WOUND ;  Surgeon: Vladimir Lee M.D.;  Location: [facility];  Service: Orthopedics   • HIP ARTH ANTERIOR TOTAL Right 1/17/2019    Procedure: HIP ARTHROPLASTY ANTERIOR TOTAL;  Surgeon: Juan C Mercedes M.D.;  Location: [facility];  Service: Orthopedics   • PACEMAKER INSERTION  06/30/2018    Dual Chamber   • KNEE ARTHROPLASTY TOTAL Right 6/23/2016    Procedure: KNEE ARTHROPLASTY TOTAL;  Surgeon: Heriberto Lozada M.D.;  Location: [facility];  Service:    • KNEE ARTHROPLASTY TOTAL Left 5/28/2015    Procedure: KNEE ARTHROPLASTY TOTAL;  Surgeon: Heriberto Lozada M.D.;  Location: [facility];  Service:    •  "CATARACT PHACO WITH IOL Right 5/5/2015    Procedure: IOL OD - STANDARD;  Surgeon: Dmitry Bejarano M.D.;  Location: SURGERY [facility];  Service:    • CATARACT PHACO WITH IOL  4/21/2015    Performed by Dmitry Bejarano M.D. at SURGERY [facility]   • RECOVERY  11/30/2010    Performed by SURGERY, CATH-RECOVERY at SURGERY SAME DAY [facility] ORS   • COLONOSCOPY  2008    Normal    [facility]   • ABDOMINAL HYSTERECTOMY TOTAL  April 15,1975    still has ovaries   • OPEN REDUCTION      left ankle   • OTHER      Removed pins from left ankle   • OTHER CARDIAC SURGERY  12/2017 and 07/19/2018     Cardiac Ablation   • TONSILLECTOMY AND ADENOIDECTOMY         Exam:   Vital signs reviewed   /68 (BP Location: Right arm, Patient Position: Sitting, BP Cuff Size: Adult)   Pulse 88   Temp 36.1 °C (97 °F) (Temporal)   Ht 1.626 m (5' 4\")   Wt 86.2 kg (190 lb)   SpO2 96%  Body mass index is 32.61 kg/m².    Hearing satisfactory.    Dentition \"seem fine\"  Alert, oriented in no acute distress.  Eye contact is good, speech goal directed, affect calm. Glasses in place. Daughter present in exam room.     Assessment and Plan. The following treatment and monitoring plan is recommended:      1. Medicare annual wellness visit, subsequent  Acute uncomplicated problem.  Annual wellness visit completed today.  He answers many questions\" that her daughter had asked.  Daughter was asking to have patient's assisted facility paperwork completed today.  I explained that during this appointment we did have time for the annual's visit only and she will need to schedule follow-up visit for paperwork only.  Daughter did seem upset but did verbalize understanding.    2. Persistent atrial fibrillation (HCC)  Chronic stable problem.  She will continue follow-up with cardiology.  She will continue with her atenolol 50 mg daily.  She will continue follow-up with anticoagulation clinic for her warfarin therapy.    3. Chronic " anticoagulation  Chronic stable problem.  Continue warfarin per anticoagulation clinic.    4. Acquired hypothyroidism  Chronic stable problem.  Due for updated labs.  She will continue with her levothyroxine 50 mcg daily empty stomach.  - TSH WITH REFLEX TO FT4; Future    5. Pressure injury of buttock, stage 3, unspecified laterality (HCC)  Chronic stable problem.  Patient requesting referral back to wound clinic.  Daughter is able to take her to appointments.  Previous wound care has been provided by home health but does not come to her home at this time.  - Referral to Wound Clinic    6. Mild episode of recurrent major depressive disorder (HCC)  Chronic stable problem.  Discussed and reviewed her PHQ score today.  Denies any self-harm or SI today.  She will continue with her sertraline 50 mg daily.    7. Stage 3a chronic kidney disease (HCC)  Stable problem.  Continue to avoid NSAIDs.  Blood pressure controlled.  She will continue with her lisinopril.    8. PVD (peripheral vascular disease) (HCC)  Chronic stable problem.  Continue blood pressure control.  Due for updated lipid panel.    9. Decreased hearing, bilateral  Chronic exacerbated problem.  Daughter expressing concern that patient is unable to hear mid range.  Per patient last hearing screen was year and a half ago.  Patient is open to updated hearing test.  - Referral to Audiology    10. Personal history of systemic lupus erythematosus (SLE) (HCC)  Chronic stable problem.  No acute complaints today.  Continue to monitor.    11. Screening for lipid disorders  Chronic stable problem.  Due for annual labs.   - Lipid Profile; Future    12. Screening for endocrine, metabolic and immunity disorder  Chronic stable problem.  Due for annual labs.  - CBC WITH DIFFERENTIAL; Future  - Comp Metabolic Panel; Future    13. Screening-pulmonary TB  Acute uncomplicated problem.  Review of patient's assisted living paperwork shows need for TB screening.  We will start with  QuantiFERON gold.  - Quantiferon Gold TB (PPD); Future      Services suggested: No services needed at this time  Health Care Screening: Age-appropriate preventive services recommended by USPTF and ACIP covered by Medicare were discussed today. Services ordered if indicated and agreed upon by the patient.  Referrals offered: Community-based lifestyle interventions to reduce health risks and promote self-management and wellness, fall prevention, nutrition, physical activity, tobacco-use cessation, weight loss, and mental health services as per orders if indicated.    Discussion today about general wellness and lifestyle habits:    · Prevent falls and reduce trip hazards; Cautioned about securing or removing rugs.  · Have a working fire alarm and carbon monoxide detector;   · Engage in regular physical activity and social activities     Follow-up: Return in about 3 months (around 7/25/2022) for Multiple Issues, Labs.    Please note that this dictation was created using voice recognition software. I have made every reasonable attempt to correct obvious errors, but I expect that there are errors of grammar and possibly content that I did not discover before finalizing the note.

## 2022-04-25 NOTE — ASSESSMENT & PLAN NOTE
Her PHQ score today is 0. She continues with sertraline 50 mg daily. She denies any self harm or SI today. Patient states that she will be moving to an [facility].

## 2022-04-25 NOTE — ASSESSMENT & PLAN NOTE
Blood pressure is at goal today.  She does not take any NSAIDs.  She is on lisinopril and furosemide.  She is due for updated labs.

## 2022-04-25 NOTE — ASSESSMENT & PLAN NOTE
She continues with atenolol 50 mg daily and warfarin. INR from February was therapeutic.  She is followed by the anticoagulation clinic.  She continues follow-up with cardiology.  Due for scheduled appoint with cardiology.  Patient informed to schedule.

## 2022-05-02 ENCOUNTER — APPOINTMENT (RX ONLY)
Dept: URBAN - METROPOLITAN AREA CLINIC 36 | Facility: CLINIC | Age: 82
Setting detail: DERMATOLOGY
End: 2022-05-02

## 2022-05-02 PROBLEM — C44.329 SQUAMOUS CELL CARCINOMA OF SKIN OF OTHER PARTS OF FACE: Status: ACTIVE | Noted: 2022-05-02

## 2022-05-02 PROCEDURE — 17312 MOHS ADDL STAGE: CPT

## 2022-05-02 PROCEDURE — ? MOHS SURGERY

## 2022-05-02 PROCEDURE — 13132 CMPLX RPR F/C/C/M/N/AX/G/H/F: CPT

## 2022-05-02 PROCEDURE — 17311 MOHS 1 STAGE H/N/HF/G: CPT

## 2022-05-02 NOTE — PROCEDURE: MOHS SURGERY
Mohs Case Number: [id number]
Previous Accession (Optional): [id number]
Biopsy Photograph Reviewed: Yes
Referring Physician (Optional): paddy
Consent Type: Consent 1 (Standard)
Eye Shield Used: No
Surgeon Performing Repair (Optional): Abdullahi
Initial Size Of Lesion: 0.5
Number Of Stages: 2
Primary Defect Length In Cm (Final Defect Size - Required For Flaps/Grafts): 2.4
Primary Defect Width In Cm (Final Defect Size - Required For Flaps/Grafts): 1
Repair Type: Complex Repair
Oculoplastic Surgeon (A): Rambo
Oculoplastic Surgeon Procedure Text (A): After obtaining clear surgical margins the patient was sent to oculoplastics for surgical repair.  The patient understands they will receive post-surgical care and follow-up from the referring physician's office.
Otolaryngologist Procedure Text (A): After obtaining clear surgical margins the patient was sent to otolaryngology for surgical repair.  The patient understands they will receive post-surgical care and follow-up from the referring physician's office.
Plastic Surgeon Procedure Text (A): After obtaining clear surgical margins the patient was sent to plastics for surgical repair.  The patient understands they will receive post-surgical care and follow-up from the referring physician's office.
Mid-Level Procedure Text (A): After obtaining clear surgical margins the patient was sent to a mid-level provider for surgical repair.  The patient understands they will receive post-surgical care and follow-up from the mid-level provider.
Provider Procedure Text (A): After obtaining clear surgical margins the defect was repaired by another provider.
Asc Procedure Text (A): After obtaining clear surgical margins the patient was sent to an ASC for surgical repair.  The patient understands they will receive post-surgical care and follow-up from the ASC physician.
Simple / Intermediate / Complex Repair - Final Wound Length In Cm: 3.3
Suturegard Retention Suture: 2-0 Nylon
Retention Suture Bite Size: 3 mm
Length To Time In Minutes Device Was In Place: 10
Complex/Intermediate Repair Variations: Crescentic
Distance Of Undermining In Cm (Required): 1.2
Undermining Type: Entire Wound
Debridement Text: The wound edges were debrided prior to proceeding with the closure to facilitate wound healing.
Helical Rim Text: The closure involved the helical rim.
Vermilion Border Text: The closure involved the vermilion border.
Nostril Rim Text: The closure involved the nostril rim.
Retention Suture Text: Retention sutures were placed to support the closure and prevent dehiscence.
Secondary Defect Length In Cm (Required For Flaps): 0
Area H Indication Text: Tumors in this location are included in Area H (eyelids, eyebrows, nose, lips, chin, ear, pre-auricular, post-auricular, temple, genitalia, hands, feet, ankles and areola).  Tissue conservation is critical in these anatomic locations.
Area M Indication Text: Tumors in this location are included in Area M (cheek, forehead, scalp, neck, jawline and pretibial skin).  Mohs surgery is indicated for tumors in these anatomic locations.
Area L Indication Text: Tumors in this location are included in Area L (trunk and extremities).  Mohs surgery is indicated for larger tumors, or tumors with aggressive histologic features, in these anatomic locations.
Depth Of Tumor Invasion (For Histology): deep fat
Perineural Invasion (For Histology - Be Specific If Possible): absent
Presence Of Scar Tissue (For Histology): present
Surgical Defect Length In Cm (Optional): 0.7
Surgical Defect Width In Cm (Optional): 0.6
Special Stains Stage 1 - Results: Base On Clearance Noted Above
Stage 2: Additional Anesthesia Type: 1% lidocaine with 1:100,000 epinephrine and 408mcg clindamycin/ml and a 1:10 solution of 8.4% sodium bicarbonate
Surgical Defect Width In Cm (Optional): 1.0
Stage 4: Additional Anesthesia Type: 1% lidocaine with epinephrine
Include Tumor Staging In Mohs Note?: Please Select the Appropriate Response
Staging Info: By selecting yes to the question above you will include information on AJCC 8 tumor staging in your Mohs note. Information on tumor staging will be automatically added for SCCs on the head and neck. AJCC 8 includes tumor size, tumor depth, perineural involvement and bone invasion.
Tumor Depth: Less than 6mm from granular layer and no invasion beyond the subcutaneous fat
Medical Necessity Statement: Based on my medical judgement, Mohs surgery is the most appropriate treatment for this cancer compared to other treatments.
Alternatives Discussed Intro (Do Not Add Period): I discussed alternative treatments to Mohs surgery and specifically discussed the risks and benefits of
Consent 1/Introductory Paragraph: The rationale for Mohs was explained to the patient and consent was obtained. The risks, benefits and alternatives to therapy were discussed in detail. Specifically, the risks of infection, scarring, bleeding, prolonged wound healing, incomplete removal, allergy to anesthesia, nerve injury and recurrence were addressed. Prior to the procedure, the treatment site was clearly identified and confirmed by the patient. All components of Universal Protocol/PAUSE Rule completed.
Consent 2/Introductory Paragraph: Mohs surgery was explained to the patient and consent was obtained. The risks, benefits and alternatives to therapy were discussed in detail. Specifically, the risks of infection, scarring, bleeding, prolonged wound healing, incomplete removal, allergy to anesthesia, nerve injury and recurrence were addressed. Prior to the procedure, the treatment site was clearly identified and confirmed by the patient. All components of Universal Protocol/PAUSE Rule completed.
Consent 3/Introductory Paragraph: I gave the patient a chance to ask questions they had about the procedure.  Following this I explained the Mohs procedure and consent was obtained. The risks, benefits and alternatives to therapy were discussed in detail. Specifically, the risks of infection, scarring, bleeding, prolonged wound healing, incomplete removal, allergy to anesthesia, nerve injury and recurrence were addressed. Prior to the procedure, the treatment site was clearly identified and confirmed by the patient. All components of Universal Protocol/PAUSE Rule completed.
Consent (Temporal Branch)/Introductory Paragraph: The rationale for Mohs was explained to the patient and consent was obtained. The risks, benefits and alternatives to therapy were discussed in detail. Specifically, the risks of damage to the temporal branch of the facial nerve, infection, scarring, bleeding, prolonged wound healing, incomplete removal, allergy to anesthesia, and recurrence were addressed. Prior to the procedure, the treatment site was clearly identified and confirmed by the patient. All components of Universal Protocol/PAUSE Rule completed.
Consent (Marginal Mandibular)/Introductory Paragraph: The rationale for Mohs was explained to the patient and consent was obtained. The risks, benefits and alternatives to therapy were discussed in detail. Specifically, the risks of damage to the marginal mandibular branch of the facial nerve, infection, scarring, bleeding, prolonged wound healing, incomplete removal, allergy to anesthesia, and recurrence were addressed. Prior to the procedure, the treatment site was clearly identified and confirmed by the patient. All components of Universal Protocol/PAUSE Rule completed.
Consent (Spinal Accessory)/Introductory Paragraph: The rationale for Mohs was explained to the patient and consent was obtained. The risks, benefits and alternatives to therapy were discussed in detail. Specifically, the risks of damage to the spinal accessory nerve, infection, scarring, bleeding, prolonged wound healing, incomplete removal, allergy to anesthesia, and recurrence were addressed. Prior to the procedure, the treatment site was clearly identified and confirmed by the patient. All components of Universal Protocol/PAUSE Rule completed.
Consent (Near Eyelid Margin)/Introductory Paragraph: The rationale for Mohs was explained to the patient and consent was obtained. The risks, benefits and alternatives to therapy were discussed in detail. Specifically, the risks of ectropion or eyelid deformity, infection, scarring, bleeding, prolonged wound healing, incomplete removal, allergy to anesthesia, nerve injury and recurrence were addressed. Prior to the procedure, the treatment site was clearly identified and confirmed by the patient. All components of Universal Protocol/PAUSE Rule completed.
Consent (Ear)/Introductory Paragraph: The rationale for Mohs was explained to the patient and consent was obtained. The risks, benefits and alternatives to therapy were discussed in detail. Specifically, the risks of ear deformity, infection, scarring, bleeding, prolonged wound healing, incomplete removal, allergy to anesthesia, nerve injury and recurrence were addressed. Prior to the procedure, the treatment site was clearly identified and confirmed by the patient. All components of Universal Protocol/PAUSE Rule completed.
Consent (Nose)/Introductory Paragraph: The rationale for Mohs was explained to the patient and consent was obtained. The risks, benefits and alternatives to therapy were discussed in detail. Specifically, the risks of nasal deformity, changes in the flow of air through the nose, infection, scarring, bleeding, prolonged wound healing, incomplete removal, allergy to anesthesia, nerve injury and recurrence were addressed. Prior to the procedure, the treatment site was clearly identified and confirmed by the patient. All components of Universal Protocol/PAUSE Rule completed.
Consent (Lip)/Introductory Paragraph: The rationale for Mohs was explained to the patient and consent was obtained. The risks, benefits and alternatives to therapy were discussed in detail. Specifically, the risks of lip deformity, changes in the oral aperture, infection, scarring, bleeding, prolonged wound healing, incomplete removal, allergy to anesthesia, nerve injury and recurrence were addressed. Prior to the procedure, the treatment site was clearly identified and confirmed by the patient. All components of Universal Protocol/PAUSE Rule completed.
Consent (Scalp)/Introductory Paragraph: The rationale for Mohs was explained to the patient and consent was obtained. The risks, benefits and alternatives to therapy were discussed in detail. Specifically, the risks of changes in hair growth pattern secondary to repair, infection, scarring, bleeding, prolonged wound healing, incomplete removal, allergy to anesthesia, nerve injury and recurrence were addressed. Prior to the procedure, the treatment site was clearly identified and confirmed by the patient. All components of Universal Protocol/PAUSE Rule completed.
Detail Level: Detailed
Postop Diagnosis: same
Anesthesia Type: 1% lidocaine with 1:100,000 epinephrine and a 1:10 solution of 8.4% sodium bicarbonate
Anesthesia Volume In Cc: 6
Hemostasis: Electrocautery
Estimated Blood Loss (Cc): less than 5 cc
Repair Anesthesia Method: local infiltration
Brow Lift Text: A midfrontal incision was made medially to the defect to allow access to the tissues just superior to the left eyebrow. Following careful dissection inferiorly in a supraperiosteal plane to the level of the left eyebrow, several 3-0 monocryl sutures were used to resuspend the eyebrow orbicularis oculi muscular unit to the superior frontal bone periosteum. This resulted in an appropriate reapproximation of static eyebrow symmetry and correction of the left brow ptosis.
Deep Sutures: 5-0 Polysorb
Epidermal Sutures: 5-0 Prolene
Epidermal Closure: running cuticular
Suturegard Intro: Intraoperative tissue expansion was performed, utilizing the SUTUREGARD device, in order to reduce wound tension.
Suturegard Body: The suture ends were repeatedly re-tightened and re-clamped to achieve the desired tissue expansion.
Hemigard Intro: Due to skin fragility and wound tension, it was decided to use HEMIGARD adhesive retention suture devices to permit a linear closure. The skin was cleaned and dried for a 6cm distance away from the wound. Excessive hair, if present, was removed to allow for adhesion.
Hemigard Postcare Instructions: The HEMIGARD strips are to remain completely dry for at least 5-7 days.
Donor Site Anesthesia Type: same as repair anesthesia
Graft Basting Suture (Optional): 5-0 Fast Absorbing Gut
Graft Donor Site Epidermal Sutures (Optional): 5-0 Ethibond
Epidermal Closure Graft Donor Site (Optional): simple interrupted
Graft Donor Site Bandage (Optional-Leave Blank If You Don't Want In Note): Aquaphor and telefa placed on wound. Pressure dressing applied to donor site
Closure 2 Information: This tab is for additional flaps and grafts, including complex repair and grafts and complex repair and flaps. You can also specify a different location for the additional defect, if the location is the same you do not need to select a new one. We will insert the automated text for the repair you select below just as we do for solitary flaps and grafts. Please note that at this time if you select a location with a different insurance zone you will need to override the ICD10 and CPT if appropriate.
Closure 3 Information: This tab is for additional flaps and grafts above and beyond our usual structured repairs.  Please note if you enter information here it will not currently bill and you will need to add the billing information manually.
Wound Care: Aquaphor
Dressing: dry sterile dressing
Wound Care (No Sutures): Petrolatum
Suture Removal: 7 days
Unna Boot Text: An Unna boot was placed to help immobilize the limb and facilitate more rapid healing.
Home Suture Removal Text: Patient was provided instructions on removing sutures and will remove their sutures at home.  If they have any questions or difficulties they will call the office.
Post-Care Instructions: I reviewed with the patient in detail post-care instructions. Patient is not to engage in any heavy lifting, exercise, or swimming for the next 14 days. Should the patient develop any fevers, chills, bleeding, severe pain patient will contact the office immediately.
Pain Refusal Text: I offered to prescribe pain medication but the patient refused to take this medication.
Mauc Instructions: By selecting yes to the question below the MAUC number will be added into the note.  This will be calculated automatically based on the diagnosis chosen, the size entered, the body zone selected (H,M,L) and the specific indications you chose. You will also have the option to override the Mohs AUC if you disagree with the automatically calculated number and this option is found in the Case Summary tab.
Where Do You Want The Question To Include Opioid Counseling Located?: Case Summary Tab
Eye Protection Verbiage: Before proceeding with the stage, a plastic scleral shield was inserted. The globe was anesthetized with a few drops of 1% lidocaine with 1:100,000 epinephrine. Then, an appropriate sized scleral shield was chosen and coated with lacrilube ointment. The shield was gently inserted and left in place for the duration of each stage. After the stage was completed, the shield was gently removed.
Mohs Method Verbiage: An incision at a 45 degree angle following the standard Mohs approach was done and the specimen was harvested as a microscopic controlled layer.
Surgeon/Pathologist Verbiage (Will Incorporate Name Of Surgeon From Intro If Not Blank): operated in two distinct and integrated capacities as the surgeon and pathologist.
Mohs Histo Method Verbiage: Each section was then chromacoded and processed in the Mohs lab using the Mohs protocol and submitted for frozen section.
Subsequent Stages Histo Method Verbiage: Using a similar technique to that described above, a thin layer of tissue was removed from all areas where tumor was visible on the previous stage.  The tissue was again oriented, mapped, dyed, and processed as above.
Mohs Rapid Report Verbiage: The area of clinically evident tumor was marked with skin marking ink and appropriately hatched.  The initial incision was made following the Mohs approach through the skin.  The specimen was taken to the lab, divided into the necessary number of pieces, chromacoded and processed according to the Mohs protocol.  This was repeated in successive stages until a tumor free defect was achieved.
Complex Repair Preamble Text (Leave Blank If You Do Not Want): Extensive wide undermining was performed at least 2 cm in all directions.
Intermediate Repair Preamble Text (Leave Blank If You Do Not Want): Undermining was performed with blunt dissection.
M-Plasty Complex Repair Preamble Text (Leave Blank If You Do Not Want): Extensive wide undermining was performed.
Non-Graft Cartilage Fenestration Text: The cartilage was fenestrated with a 2mm punch biopsy to help facilitate healing.
Graft Cartilage Fenestration Text: The cartilage was fenestrated with a 2mm punch biopsy to help facilitate graft survival and healing.
Secondary Intention Text (Leave Blank If You Do Not Want): The defect will heal with secondary intention.
No Repair - Repaired With Adjacent Surgical Defect Text (Leave Blank If You Do Not Want): After obtaining clear surgical margins the defect was repaired concurrently with another surgical defect which was in close approximation.
Adjacent Tissue Transfer Text: The defect edges were debeveled with a #15 scalpel blade.  Given the location of the defect and the proximity to free margins an adjacent tissue transfer was deemed most appropriate.  Using a sterile surgical marker, an appropriate flap was drawn incorporating the defect and placing the expected incisions within the relaxed skin tension lines where possible.    The area thus outlined was incised deep to adipose tissue with a #15 scalpel blade.  The skin margins were undermined to an appropriate distance in all directions utilizing iris scissors.
Advancement Flap (Single) Text: The defect edges were debeveled with a #15 scalpel blade.  Given the location of the defect and the proximity to free margins a single advancement flap was deemed most appropriate.  Using a sterile surgical marker, an appropriate advancement flap was drawn incorporating the defect and placing the expected incisions within the relaxed skin tension lines where possible.    The area thus outlined was incised deep to adipose tissue with a #15 scalpel blade.  The skin margins were undermined to an appropriate distance in all directions utilizing iris scissors.
Advancement Flap (Double) Text: The defect edges were debeveled with a #15 scalpel blade.  Given the location of the defect and the proximity to free margins a double advancement flap was deemed most appropriate.  Using a sterile surgical marker, the appropriate advancement flaps were drawn incorporating the defect and placing the expected incisions within the relaxed skin tension lines where possible.    The area thus outlined was incised deep to adipose tissue with a #15 scalpel blade.  The skin margins were undermined to an appropriate distance in all directions utilizing iris scissors.
Burow's Advancement Flap Text: The defect edges were debeveled with a #15 scalpel blade.  Given the location of the defect and the proximity to free margins a Burow's advancement flap was deemed most appropriate.  Using a sterile surgical marker, the appropriate advancement flap was drawn incorporating the defect and placing the expected incisions within the relaxed skin tension lines where possible.    The area thus outlined was incised deep to adipose tissue with a #15 scalpel blade.  The skin margins were undermined to an appropriate distance in all directions utilizing iris scissors.
Chonodrocutaneous Helical Advancement Flap Text: The defect edges were debeveled with a #15 scalpel blade.  Given the location of the defect and the proximity to free margins a chondrocutaneous helical advancement flap was deemed most appropriate.  Using a sterile surgical marker, the appropriate advancement flap was drawn incorporating the defect and placing the expected incisions within the relaxed skin tension lines where possible.    The area thus outlined was incised deep to adipose tissue with a #15 scalpel blade.  The skin margins were undermined to an appropriate distance in all directions utilizing iris scissors.
Crescentic Advancement Flap Text: The defect edges were debeveled with a #15 scalpel blade.  Given the location of the defect and the proximity to free margins a crescentic advancement flap was deemed most appropriate.  Using a sterile surgical marker, the appropriate advancement flap was drawn incorporating the defect and placing the expected incisions within the relaxed skin tension lines where possible.    The area thus outlined was incised deep to adipose tissue with a #15 scalpel blade.  The skin margins were undermined to an appropriate distance in all directions utilizing iris scissors.
A-T Advancement Flap Text: The defect edges were debeveled with a #15 scalpel blade.  Given the location of the defect, shape of the defect and the proximity to free margins an A-T advancement flap was deemed most appropriate.  Using a sterile surgical marker, an appropriate advancement flap was drawn incorporating the defect and placing the expected incisions within the relaxed skin tension lines where possible.    The area thus outlined was incised deep to adipose tissue with a #15 scalpel blade.  The skin margins were undermined to an appropriate distance in all directions utilizing iris scissors.
O-T Advancement Flap Text: The defect edges were debeveled with a #15 scalpel blade.  Given the location of the defect, shape of the defect and the proximity to free margins an O-T advancement flap was deemed most appropriate.  Using a sterile surgical marker, an appropriate advancement flap was drawn incorporating the defect and placing the expected incisions within the relaxed skin tension lines where possible.    The area thus outlined was incised deep to adipose tissue with a #15 scalpel blade.  The skin margins were undermined to an appropriate distance in all directions utilizing iris scissors.
O-L Flap Text: The defect edges were debeveled with a #15 scalpel blade.  Given the location of the defect, shape of the defect and the proximity to free margins an O-L flap was deemed most appropriate.  Using a sterile surgical marker, an appropriate advancement flap was drawn incorporating the defect and placing the expected incisions within the relaxed skin tension lines where possible.    The area thus outlined was incised deep to adipose tissue with a #15 scalpel blade.  The skin margins were undermined to an appropriate distance in all directions utilizing iris scissors.
O-Z Flap Text: The defect edges were debeveled with a #15 scalpel blade.  Given the location of the defect, shape of the defect and the proximity to free margins an O-Z flap was deemed most appropriate.  Using a sterile surgical marker, an appropriate transposition flap was drawn incorporating the defect and placing the expected incisions within the relaxed skin tension lines where possible. The area thus outlined was incised deep to adipose tissue with a #15 scalpel blade.  The skin margins were undermined to an appropriate distance in all directions utilizing iris scissors.
Double O-Z Flap Text: The defect edges were debeveled with a #15 scalpel blade.  Given the location of the defect, shape of the defect and the proximity to free margins a Double O-Z flap was deemed most appropriate.  Using a sterile surgical marker, an appropriate transposition flap was drawn incorporating the defect and placing the expected incisions within the relaxed skin tension lines where possible. The area thus outlined was incised deep to adipose tissue with a #15 scalpel blade.  The skin margins were undermined to an appropriate distance in all directions utilizing iris scissors.
V-Y Flap Text: The defect edges were debeveled with a #15 scalpel blade.  Given the location of the defect, shape of the defect and the proximity to free margins a V-Y flap was deemed most appropriate.  Using a sterile surgical marker, an appropriate advancement flap was drawn incorporating the defect and placing the expected incisions within the relaxed skin tension lines where possible.    The area thus outlined was incised deep to adipose tissue with a #15 scalpel blade.  The skin margins were undermined to an appropriate distance in all directions utilizing iris scissors.
Advancement-Rotation Flap Text: The defect edges were debeveled with a #15 scalpel blade.  Given the location of the defect, shape of the defect and the proximity to free margins an advancement-rotation flap was deemed most appropriate.  Using a sterile surgical marker, an appropriate flap was drawn incorporating the defect and placing the expected incisions within the relaxed skin tension lines where possible. The area thus outlined was incised deep to adipose tissue with a #15 scalpel blade.  The skin margins were undermined to an appropriate distance in all directions utilizing iris scissors.
Mercedes Flap Text: The defect edges were debeveled with a #15 scalpel blade.  Given the location of the defect, shape of the defect and the proximity to free margins a Mercedes flap was deemed most appropriate.  Using a sterile surgical marker, an appropriate advancement flap was drawn incorporating the defect and placing the expected incisions within the relaxed skin tension lines where possible. The area thus outlined was incised deep to adipose tissue with a #15 scalpel blade.  The skin margins were undermined to an appropriate distance in all directions utilizing iris scissors.
Modified Advancement Flap Text: The defect edges were debeveled with a #15 scalpel blade.  Given the location of the defect, shape of the defect and the proximity to free margins a modified advancement flap was deemed most appropriate.  Using a sterile surgical marker, an appropriate advancement flap was drawn incorporating the defect and placing the expected incisions within the relaxed skin tension lines where possible.    The area thus outlined was incised deep to adipose tissue with a #15 scalpel blade.  The skin margins were undermined to an appropriate distance in all directions utilizing iris scissors.
Mucosal Advancement Flap Text: Given the location of the defect, shape of the defect and the proximity to free margins a mucosal advancement flap was deemed most appropriate. Incisions were made with a 15 blade scalpel in the appropriate fashion along the cutaneous vermilion border and the mucosal lip. The remaining actinically damaged mucosal tissue was excised.  The mucosal advancement flap was then elevated to the gingival sulcus with care taken to preserve the neurovascular structures and advanced into the primary defect. Care was taken to ensure that precise realignment of the vermilion border was achieved.
Peng Advancement Flap Text: The defect edges were debeveled with a #15 scalpel blade.  Given the location of the defect, shape of the defect and the proximity to free margins a Peng advancement flap was deemed most appropriate.  Using a sterile surgical marker, an appropriate advancement flap was drawn incorporating the defect and placing the expected incisions within the relaxed skin tension lines where possible. The area thus outlined was incised deep to adipose tissue with a #15 scalpel blade.  The skin margins were undermined to an appropriate distance in all directions utilizing iris scissors.
Hatchet Flap Text: The defect edges were debeveled with a #15 scalpel blade.  Given the location of the defect, shape of the defect and the proximity to free margins a hatchet flap based from the glabella was deemed most appropriate.  Using a sterile surgical marker, an appropriate glabellar hatchet flap was drawn incorporating the defect and placing the expected incisions within the relaxed skin tension lines where possible.    The area thus outlined was incised deep to adipose tissue with a #15 scalpel blade.  The skin margins were undermined to an appropriate distance in all directions utilizing iris scissors.
Rotation Flap Text: The defect edges were debeveled with a #15 scalpel blade.  Given the location of the defect, shape of the defect and the proximity to free margins a rotation flap was deemed most appropriate.  Using a sterile surgical marker, an appropriate rotation flap was drawn incorporating the defect and placing the expected incisions within the relaxed skin tension lines where possible.    The area thus outlined was incised deep to adipose tissue with a #15 scalpel blade.  The skin margins were undermined to an appropriate distance in all directions utilizing iris scissors.
Spiral Flap Text: The defect edges were debeveled with a #15 scalpel blade.  Given the location of the defect, shape of the defect and the proximity to free margins a spiral flap was deemed most appropriate.  Using a sterile surgical marker, an appropriate rotation flap was drawn incorporating the defect and placing the expected incisions within the relaxed skin tension lines where possible. The area thus outlined was incised deep to adipose tissue with a #15 scalpel blade.  The skin margins were undermined to an appropriate distance in all directions utilizing iris scissors.
Staged Advancement Flap Text: The defect edges were debeveled with a #15 scalpel blade.  Given the location of the defect, shape of the defect and the proximity to free margins a staged advancement flap was deemed most appropriate.  Using a sterile surgical marker, an appropriate advancement flap was drawn incorporating the defect and placing the expected incisions within the relaxed skin tension lines where possible. The area thus outlined was incised deep to adipose tissue with a #15 scalpel blade.  The skin margins were undermined to an appropriate distance in all directions utilizing iris scissors.
Star Wedge Flap Text: The defect edges were debeveled with a #15 scalpel blade.  Given the location of the defect, shape of the defect and the proximity to free margins a star wedge flap was deemed most appropriate.  Using a sterile surgical marker, an appropriate rotation flap was drawn incorporating the defect and placing the expected incisions within the relaxed skin tension lines where possible. The area thus outlined was incised deep to adipose tissue with a #15 scalpel blade.  The skin margins were undermined to an appropriate distance in all directions utilizing iris scissors.
Transposition Flap Text: The defect edges were debeveled with a #15 scalpel blade.  Given the location of the defect and the proximity to free margins a transposition flap was deemed most appropriate.  Using a sterile surgical marker, an appropriate transposition flap was drawn incorporating the defect.    The area thus outlined was incised deep to adipose tissue with a #15 scalpel blade.  The skin margins were undermined to an appropriate distance in all directions utilizing iris scissors.
Muscle Hinge Flap Text: The defect edges were debeveled with a #15 scalpel blade.  Given the size, depth and location of the defect and the proximity to free margins a muscle hinge flap was deemed most appropriate.  Using a sterile surgical marker, an appropriate hinge flap was drawn incorporating the defect. The area thus outlined was incised with a #15 scalpel blade.  The skin margins were undermined to an appropriate distance in all directions utilizing iris scissors.
Mustarde Flap Text: The defect edges were debeveled with a #15 scalpel blade.  Given the size, depth and location of the defect and the proximity to free margins a Mustarde flap was deemed most appropriate.  Using a sterile surgical marker, an appropriate flap was drawn incorporating the defect. The area thus outlined was incised with a #15 scalpel blade.  The skin margins were undermined to an appropriate distance in all directions utilizing iris scissors.
Nasal Turnover Hinge Flap Text: The defect edges were debeveled with a #15 scalpel blade.  Given the size, depth, location of the defect and the defect being full thickness a nasal turnover hinge flap was deemed most appropriate.  Using a sterile surgical marker, an appropriate hinge flap was drawn incorporating the defect. The area thus outlined was incised with a #15 scalpel blade. The flap was designed to recreate the nasal mucosal lining and the alar rim. The skin margins were undermined to an appropriate distance in all directions utilizing iris scissors.
Nasalis-Muscle-Based Myocutaneous Island Pedicle Flap Text: Using a #15 blade, an incision was made around the donor flap to the level of the nasalis muscle. Wide lateral undermining was then performed in both the subcutaneous plane above the nasalis muscle, and in a submuscular plane just above periosteum. This allowed the formation of a free nasalis muscle axial pedicle (based on the angular artery) which was still attached to the actual cutaneous flap, increasing its mobility and vascular viability. Hemostasis was obtained with pinpoint electrocoagulation. The flap was mobilized into position and the pivotal anchor points positioned and stabilized with buried interrupted sutures. Subcutaneous and dermal tissues were closed in a multilayered fashion with sutures. Tissue redundancies were excised, and the epidermal edges were apposed without significant tension and sutured with sutures.
Orbicularis Oris Muscle Flap Text: The defect edges were debeveled with a #15 scalpel blade.  Given that the defect affected the competency of the oral sphincter an orbicularis oris muscle flap was deemed most appropriate to restore this competency and normal muscle function.  Using a sterile surgical marker, an appropriate flap was drawn incorporating the defect. The area thus outlined was incised with a #15 scalpel blade.
Melolabial Transposition Flap Text: The defect edges were debeveled with a #15 scalpel blade.  Given the location of the defect and the proximity to free margins a melolabial flap was deemed most appropriate.  Using a sterile surgical marker, an appropriate melolabial transposition flap was drawn incorporating the defect.    The area thus outlined was incised deep to adipose tissue with a #15 scalpel blade.  The skin margins were undermined to an appropriate distance in all directions utilizing iris scissors.
Rhombic Flap Text: The defect edges were debeveled with a #15 scalpel blade.  Given the location of the defect and the proximity to free margins a rhombic flap was deemed most appropriate.  Using a sterile surgical marker, an appropriate rhombic flap was drawn incorporating the defect.    The area thus outlined was incised deep to adipose tissue with a #15 scalpel blade.  The skin margins were undermined to an appropriate distance in all directions utilizing iris scissors.
Rhomboid Transposition Flap Text: The defect edges were debeveled with a #15 scalpel blade.  Given the location of the defect and the proximity to free margins a rhomboid transposition flap was deemed most appropriate.  Using a sterile surgical marker, an appropriate rhomboid flap was drawn incorporating the defect.    The area thus outlined was incised deep to adipose tissue with a #15 scalpel blade.  The skin margins were undermined to an appropriate distance in all directions utilizing iris scissors.
Bi-Rhombic Flap Text: The defect edges were debeveled with a #15 scalpel blade.  Given the location of the defect and the proximity to free margins a bi-rhombic flap was deemed most appropriate.  Using a sterile surgical marker, an appropriate rhombic flap was drawn incorporating the defect. The area thus outlined was incised deep to adipose tissue with a #15 scalpel blade.  The skin margins were undermined to an appropriate distance in all directions utilizing iris scissors.
Helical Rim Advancement Flap Text: The defect edges were debeveled with a #15 blade scalpel.  Given the location of the defect and the proximity to free margins (helical rim) a double helical rim advancement flap was deemed most appropriate.  Using a sterile surgical marker, the appropriate advancement flaps were drawn incorporating the defect and placing the expected incisions between the helical rim and antihelix where possible.  The area thus outlined was incised through and through with a #15 scalpel blade.  With a skin hook and iris scissors, the flaps were gently and sharply undermined and freed up.
Bilateral Helical Rim Advancement Flap Text: The defect edges were debeveled with a #15 blade scalpel.  Given the location of the defect and the proximity to free margins (helical rim) a bilateral helical rim advancement flap was deemed most appropriate.  Using a sterile surgical marker, the appropriate advancement flaps were drawn incorporating the defect and placing the expected incisions between the helical rim and antihelix where possible.  The area thus outlined was incised through and through with a #15 scalpel blade.  With a skin hook and iris scissors, the flaps were gently and sharply undermined and freed up.
Ear Star Wedge Flap Text: The defect edges were debeveled with a #15 blade scalpel.  Given the location of the defect and the proximity to free margins (helical rim) an ear star wedge flap was deemed most appropriate.  Using a sterile surgical marker, the appropriate flap was drawn incorporating the defect and placing the expected incisions between the helical rim and antihelix where possible.  The area thus outlined was incised through and through with a #15 scalpel blade.
Banner Transposition Flap Text: The defect edges were debeveled with a #15 scalpel blade.  Given the location of the defect and the proximity to free margins a Banner transposition flap was deemed most appropriate.  Using a sterile surgical marker, an appropriate flap drawn around the defect. The area thus outlined was incised deep to adipose tissue with a #15 scalpel blade.  The skin margins were undermined to an appropriate distance in all directions utilizing iris scissors.
Bilobed Flap Text: The defect edges were debeveled with a #15 scalpel blade.  Given the location of the defect and the proximity to free margins a bilobe flap was deemed most appropriate.  Using a sterile surgical marker, an appropriate bilobe flap drawn around the defect.    The area thus outlined was incised deep to adipose tissue with a #15 scalpel blade.  The skin margins were undermined to an appropriate distance in all directions utilizing iris scissors.
Bilobed Transposition Flap Text: The defect edges were debeveled with a #15 scalpel blade.  Given the location of the defect and the proximity to free margins a bilobed transposition flap was deemed most appropriate.  Using a sterile surgical marker, an appropriate bilobe flap drawn around the defect.    The area thus outlined was incised deep to adipose tissue with a #15 scalpel blade.  The skin margins were undermined to an appropriate distance in all directions utilizing iris scissors.
Trilobed Flap Text: The defect edges were debeveled with a #15 scalpel blade.  Given the location of the defect and the proximity to free margins a trilobed flap was deemed most appropriate.  Using a sterile surgical marker, an appropriate trilobed flap drawn around the defect.    The area thus outlined was incised deep to adipose tissue with a #15 scalpel blade.  The skin margins were undermined to an appropriate distance in all directions utilizing iris scissors.
Dorsal Nasal Flap Text: The defect edges were debeveled with a #15 scalpel blade.  Given the location of the defect and the proximity to free margins a dorsal nasal flap,based upon the glabellar folds, was deemed most appropriate.  Using a sterile surgical marker, an appropriate dorsal nasal flap was drawn around the defect.    The area thus outlined was incised deep to adipose tissue with a #15 scalpel blade.  The skin margins were undermined to an appropriate distance in all directions utilizing iris scissors.
Island Pedicle Flap Text: The defect edges were debeveled with a #15 scalpel blade.  Given the location of the defect, shape of the defect and the proximity to free margins an island pedicle advancement flap was deemed most appropriate.  Using a sterile surgical marker, an appropriate advancement flap was drawn incorporating the defect, outlining the appropriate donor tissue and placing the expected incisions within the relaxed skin tension lines where possible.    The area thus outlined was incised deep to adipose tissue with a #15 scalpel blade.  The skin margins were undermined to an appropriate distance in all directions around the primary defect and laterally outward around the island pedicle utilizing iris scissors.  There was minimal undermining beneath the pedicle flap.
Island Pedicle Flap With Canthal Suspension Text: The defect edges were debeveled with a #15 scalpel blade.  Given the location of the defect, shape of the defect and the proximity to free margins an island pedicle advancement flap was deemed most appropriate.  Using a sterile surgical marker, an appropriate advancement flap was drawn incorporating the defect, outlining the appropriate donor tissue and placing the expected incisions within the relaxed skin tension lines where possible. The area thus outlined was incised deep to adipose tissue with a #15 scalpel blade.  The skin margins were undermined to an appropriate distance in all directions around the primary defect and laterally outward around the island pedicle utilizing iris scissors.  There was minimal undermining beneath the pedicle flap. A suspension suture was placed in the canthal tendon to prevent tension and prevent ectropion.
Alar Island Pedicle Flap Text: The defect edges were debeveled with a #15 scalpel blade.  Given the location of the defect, shape of the defect and the proximity to the alar rim an island pedicle advancement flap was deemed most appropriate.  Using a sterile surgical marker, an appropriate advancement flap was drawn incorporating the defect, outlining the appropriate donor tissue and placing the expected incisions within the nasal ala running parallel to the alar rim. The area thus outlined was incised with a #15 scalpel blade.  The skin margins were undermined minimally to an appropriate distance in all directions around the primary defect and laterally outward around the island pedicle utilizing iris scissors.  There was minimal undermining beneath the pedicle flap.
Double Island Pedicle Flap Text: The defect edges were debeveled with a #15 scalpel blade.  Given the location of the defect, shape of the defect and the proximity to free margins a double island pedicle advancement flap was deemed most appropriate.  Using a sterile surgical marker, an appropriate advancement flap was drawn incorporating the defect, outlining the appropriate donor tissue and placing the expected incisions within the relaxed skin tension lines where possible.    The area thus outlined was incised deep to adipose tissue with a #15 scalpel blade.  The skin margins were undermined to an appropriate distance in all directions around the primary defect and laterally outward around the island pedicle utilizing iris scissors.  There was minimal undermining beneath the pedicle flap.
Island Pedicle Flap-Requiring Vessel Identification Text: The defect edges were debeveled with a #15 scalpel blade.  Given the location of the defect, shape of the defect and the proximity to free margins an island pedicle advancement flap was deemed most appropriate.  Using a sterile surgical marker, an appropriate advancement flap was drawn, based on the axial vessel mentioned above, incorporating the defect, outlining the appropriate donor tissue and placing the expected incisions within the relaxed skin tension lines where possible.    The area thus outlined was incised deep to adipose tissue with a #15 scalpel blade.  The skin margins were undermined to an appropriate distance in all directions around the primary defect and laterally outward around the island pedicle utilizing iris scissors.  There was minimal undermining beneath the pedicle flap.
Keystone Flap Text: The defect edges were debeveled with a #15 scalpel blade.  Given the location of the defect, shape of the defect a keystone flap was deemed most appropriate.  Using a sterile surgical marker, an appropriate keystone flap was drawn incorporating the defect, outlining the appropriate donor tissue and placing the expected incisions within the relaxed skin tension lines where possible. The area thus outlined was incised deep to adipose tissue with a #15 scalpel blade.  The skin margins were undermined to an appropriate distance in all directions around the primary defect and laterally outward around the flap utilizing iris scissors.
O-T Plasty Text: The defect edges were debeveled with a #15 scalpel blade.  Given the location of the defect, shape of the defect and the proximity to free margins an O-T plasty was deemed most appropriate.  Using a sterile surgical marker, an appropriate O-T plasty was drawn incorporating the defect and placing the expected incisions within the relaxed skin tension lines where possible.    The area thus outlined was incised deep to adipose tissue with a #15 scalpel blade.  The skin margins were undermined to an appropriate distance in all directions utilizing iris scissors.
O-Z Plasty Text: The defect edges were debeveled with a #15 scalpel blade.  Given the location of the defect, shape of the defect and the proximity to free margins an O-Z plasty (double transposition flap) was deemed most appropriate.  Using a sterile surgical marker, the appropriate transposition flaps were drawn incorporating the defect and placing the expected incisions within the relaxed skin tension lines where possible.    The area thus outlined was incised deep to adipose tissue with a #15 scalpel blade.  The skin margins were undermined to an appropriate distance in all directions utilizing iris scissors.  Hemostasis was achieved with electrocautery.  The flaps were then transposed into place, one clockwise and the other counterclockwise, and anchored with interrupted buried subcutaneous sutures.
Double O-Z Plasty Text: The defect edges were debeveled with a #15 scalpel blade.  Given the location of the defect, shape of the defect and the proximity to free margins a Double O-Z plasty (double transposition flap) was deemed most appropriate.  Using a sterile surgical marker, the appropriate transposition flaps were drawn incorporating the defect and placing the expected incisions within the relaxed skin tension lines where possible. The area thus outlined was incised deep to adipose tissue with a #15 scalpel blade.  The skin margins were undermined to an appropriate distance in all directions utilizing iris scissors.  Hemostasis was achieved with electrocautery.  The flaps were then transposed into place, one clockwise and the other counterclockwise, and anchored with interrupted buried subcutaneous sutures.
V-Y Plasty Text: The defect edges were debeveled with a #15 scalpel blade.  Given the location of the defect, shape of the defect and the proximity to free margins an V-Y advancement flap was deemed most appropriate.  Using a sterile surgical marker, an appropriate advancement flap was drawn incorporating the defect and placing the expected incisions within the relaxed skin tension lines where possible.    The area thus outlined was incised deep to adipose tissue with a #15 scalpel blade.  The skin margins were undermined to an appropriate distance in all directions utilizing iris scissors.
H Plasty Text: Given the location of the defect, shape of the defect and the proximity to free margins a H-plasty was deemed most appropriate for repair.  Using a sterile surgical marker, the appropriate advancement arms of the H-plasty were drawn incorporating the defect and placing the expected incisions within the relaxed skin tension lines where possible. The area thus outlined was incised deep to adipose tissue with a #15 scalpel blade. The skin margins were undermined to an appropriate distance in all directions utilizing iris scissors.  The opposing advancement arms were then advanced into place in opposite direction and anchored with interrupted buried subcutaneous sutures.
W Plasty Text: The lesion was extirpated to the level of the fat with a #15 scalpel blade.  Given the location of the defect, shape of the defect and the proximity to free margins a W-plasty was deemed most appropriate for repair.  Using a sterile surgical marker, the appropriate transposition arms of the W-plasty were drawn incorporating the defect and placing the expected incisions within the relaxed skin tension lines where possible.    The area thus outlined was incised deep to adipose tissue with a #15 scalpel blade.  The skin margins were undermined to an appropriate distance in all directions utilizing iris scissors.  The opposing transposition arms were then transposed into place in opposite direction and anchored with interrupted buried subcutaneous sutures.
Z Plasty Text: The lesion was extirpated to the level of the fat with a #15 scalpel blade.  Given the location of the defect, shape of the defect and the proximity to free margins a Z-plasty was deemed most appropriate for repair.  Using a sterile surgical marker, the appropriate transposition arms of the Z-plasty were drawn incorporating the defect and placing the expected incisions within the relaxed skin tension lines where possible.    The area thus outlined was incised deep to adipose tissue with a #15 scalpel blade.  The skin margins were undermined to an appropriate distance in all directions utilizing iris scissors.  The opposing transposition arms were then transposed into place in opposite direction and anchored with interrupted buried subcutaneous sutures.
Zygomaticofacial Flap Text: Given the location of the defect, shape of the defect and the proximity to free margins a zygomaticofacial flap was deemed most appropriate for repair.  Using a sterile surgical marker, the appropriate flap was drawn incorporating the defect and placing the expected incisions within the relaxed skin tension lines where possible. The area thus outlined was incised deep to adipose tissue with a #15 scalpel blade with preservation of a vascular pedicle.  The skin margins were undermined to an appropriate distance in all directions utilizing iris scissors.  The flap was then placed into the defect and anchored with interrupted buried subcutaneous sutures.
Cheek Interpolation Flap Text: A decision was made to reconstruct the defect utilizing an interpolation axial flap and a staged reconstruction.  A telfa template was made of the defect.  This telfa template was then used to outline the Cheek Interpolation flap.  The donor area for the pedicle flap was then injected with anesthesia.  The flap was excised through the skin and subcutaneous tissue down to the layer of the underlying musculature.  The interpolation flap was carefully excised within this deep plane to maintain its blood supply.  The edges of the donor site were undermined.   The donor site was closed in a primary fashion.  The pedicle was then rotated into position and sutured.  Once the tube was sutured into place, adequate blood supply was confirmed with blanching and refill.  The pedicle was then wrapped with xeroform gauze and dressed appropriately with a telfa and gauze bandage to ensure continued blood supply and protect the attached pedicle.
Cheek-To-Nose Interpolation Flap Text: A decision was made to reconstruct the defect utilizing an interpolation axial flap and a staged reconstruction.  A telfa template was made of the defect.  This telfa template was then used to outline the Cheek-To-Nose Interpolation flap.  The donor area for the pedicle flap was then injected with anesthesia.  The flap was excised through the skin and subcutaneous tissue down to the layer of the underlying musculature.  The interpolation flap was carefully excised within this deep plane to maintain its blood supply.  The edges of the donor site were undermined.   The donor site was closed in a primary fashion.  The pedicle was then rotated into position and sutured.  Once the tube was sutured into place, adequate blood supply was confirmed with blanching and refill.  The pedicle was then wrapped with xeroform gauze and dressed appropriately with a telfa and gauze bandage to ensure continued blood supply and protect the attached pedicle.
Interpolation Flap Text: A decision was made to reconstruct the defect utilizing an interpolation axial flap and a staged reconstruction.  A telfa template was made of the defect.  This telfa template was then used to outline the interpolation flap.  The donor area for the pedicle flap was then injected with anesthesia.  The flap was excised through the skin and subcutaneous tissue down to the layer of the underlying musculature.  The interpolation flap was carefully excised within this deep plane to maintain its blood supply.  The edges of the donor site were undermined.   The donor site was closed in a primary fashion.  The pedicle was then rotated into position and sutured.  Once the tube was sutured into place, adequate blood supply was confirmed with blanching and refill.  The pedicle was then wrapped with xeroform gauze and dressed appropriately with a telfa and gauze bandage to ensure continued blood supply and protect the attached pedicle.
Melolabial Interpolation Flap Text: A decision was made to reconstruct the defect utilizing an interpolation axial flap and a staged reconstruction.  A telfa template was made of the defect.  This telfa template was then used to outline the melolabial interpolation flap.  The donor area for the pedicle flap was then injected with anesthesia.  The flap was excised through the skin and subcutaneous tissue down to the layer of the underlying musculature.  The pedicle flap was carefully excised within this deep plane to maintain its blood supply.  The edges of the donor site were undermined.   The donor site was closed in a primary fashion.  The pedicle was then rotated into position and sutured.  Once the tube was sutured into place, adequate blood supply was confirmed with blanching and refill.  The pedicle was then wrapped with xeroform gauze and dressed appropriately with a telfa and gauze bandage to ensure continued blood supply and protect the attached pedicle.
Mastoid Interpolation Flap Text: A decision was made to reconstruct the defect utilizing an interpolation axial flap and a staged reconstruction.  A telfa template was made of the defect.  This telfa template was then used to outline the mastoid interpolation flap.  The donor area for the pedicle flap was then injected with anesthesia.  The flap was excised through the skin and subcutaneous tissue down to the layer of the underlying musculature.  The pedicle flap was carefully excised within this deep plane to maintain its blood supply.  The edges of the donor site were undermined.   The donor site was closed in a primary fashion.  The pedicle was then rotated into position and sutured.  Once the tube was sutured into place, adequate blood supply was confirmed with blanching and refill.  The pedicle was then wrapped with xeroform gauze and dressed appropriately with a telfa and gauze bandage to ensure continued blood supply and protect the attached pedicle.
Posterior Auricular Interpolation Flap Text: A decision was made to reconstruct the defect utilizing an interpolation axial flap and a staged reconstruction.  A telfa template was made of the defect.  This telfa template was then used to outline the posterior auricular interpolation flap.  The donor area for the pedicle flap was then injected with anesthesia.  The flap was excised through the skin and subcutaneous tissue down to the layer of the underlying musculature.  The pedicle flap was carefully excised within this deep plane to maintain its blood supply.  The edges of the donor site were undermined.   The donor site was closed in a primary fashion.  The pedicle was then rotated into position and sutured.  Once the tube was sutured into place, adequate blood supply was confirmed with blanching and refill.  The pedicle was then wrapped with xeroform gauze and dressed appropriately with a telfa and gauze bandage to ensure continued blood supply and protect the attached pedicle.
Paramedian Forehead Flap Text: A decision was made to reconstruct the defect utilizing an interpolation axial flap and a staged reconstruction.  A telfa template was made of the defect.  This telfa template was then used to outline the paramedian forehead pedicle flap.  The donor area for the pedicle flap was then injected with anesthesia.  The flap was excised through the skin and subcutaneous tissue down to the layer of the underlying musculature.  The pedicle flap was carefully excised within this deep plane to maintain its blood supply.  The edges of the donor site were undermined.   The donor site was closed in a primary fashion.  The pedicle was then rotated into position and sutured.  Once the tube was sutured into place, adequate blood supply was confirmed with blanching and refill.  The pedicle was then wrapped with xeroform gauze and dressed appropriately with a telfa and gauze bandage to ensure continued blood supply and protect the attached pedicle.
Cheiloplasty (Less Than 50%) Text: A decision was made to reconstruct the defect with a  cheiloplasty.  The defect was undermined extensively.  Additional obicularis oris muscle was excised with a 15 blade scalpel.  The defect was converted into a full thickness wedge, of less than 50% of the vertical height of the lip, to facilite a better cosmetic result.  Small vessels were then tied off with 5-0 monocyrl. The obicularis oris, superficial fascia, adipose and dermis were then reapproximated.  After the deeper layers were approximated the epidermis was reapproximated with particular care given to realign the vermilion border.
Cheiloplasty (Complex) Text: A decision was made to reconstruct the defect with a  cheiloplasty.  The defect was undermined extensively.  Additional obicularis oris muscle was excised with a 15 blade scalpel.  The defect was converted into a full thickness wedge to facilite a better cosmetic result.  Small vessels were then tied off with 5-0 monocyrl. The obicularis oris, superficial fascia, adipose and dermis were then reapproximated.  After the deeper layers were approximated the epidermis was reapproximated with particular care given to realign the vermilion border.
Ear Wedge Repair Text: A wedge excision was completed by carrying down an excision through the full thickness of the ear and cartilage with an inward facing Burow's triangle. The wound was then closed in a layered fashion.
Full Thickness Lip Wedge Repair (Flap) Text: Given the location of the defect and the proximity to free margins a full thickness wedge repair was deemed most appropriate.  Using a sterile surgical marker, the appropriate repair was drawn incorporating the defect and placing the expected incisions perpendicular to the vermilion border.  The vermilion border was also meticulously outlined to ensure appropriate reapproximation during the repair.  The area thus outlined was incised through and through with a #15 scalpel blade.  The muscularis and dermis were reaproximated with deep sutures following hemostasis. Care was taken to realign the vermilion border before proceeding with the superficial closure.  Once the vermilion was realigned the superfical and mucosal closure was finished.
Ftsg Text: The defect edges were debeveled with a #15 scalpel blade.  Given the location of the defect, shape of the defect and the proximity to free margins a full thickness skin graft was deemed most appropriate.  Using a sterile surgical marker, the primary defect shape was transferred to the donor site. The area thus outlined was incised deep to adipose tissue with a #15 scalpel blade.  The harvested graft was then trimmed of adipose tissue until only dermis and epidermis was left.  The skin margins of the secondary defect were undermined to an appropriate distance in all directions utilizing iris scissors.  The secondary defect was closed with interrupted buried subcutaneous sutures.  The skin edges were then re-apposed with running  sutures.  The skin graft was then placed in the primary defect and oriented appropriately.
Split-Thickness Skin Graft Text: The defect edges were debeveled with a #15 scalpel blade.  Given the location of the defect, shape of the defect and the proximity to free margins a split thickness skin graft was deemed most appropriate.  Using a sterile surgical marker, the primary defect shape was transferred to the donor site. The split thickness graft was then harvested.  The skin graft was then placed in the primary defect and oriented appropriately.
Burow's Graft Text: The defect edges were debeveled with a #15 scalpel blade.  Given the location of the defect, shape of the defect, the proximity to free margins and the presence of a standing cone deformity a Burow's skin graft was deemed most appropriate. The standing cone was removed and this tissue was then trimmed to the shape of the primary defect. The adipose tissue was also removed until only dermis and epidermis were left.  The skin margins of the secondary defect were undermined to an appropriate distance in all directions utilizing iris scissors.  The secondary defect was closed with interrupted buried subcutaneous sutures.  The skin edges were then re-apposed with running  sutures.  The skin graft was then placed in the primary defect and oriented appropriately.
Cartilage Graft Text: The defect edges were debeveled with a #15 scalpel blade.  Given the location of the defect, shape of the defect, the fact the defect involved a full thickness cartilage defect a cartilage graft was deemed most appropriate.  An appropriate donor site was identified, cleansed, and anesthetized. The cartilage graft was then harvested and transferred to the recipient site, oriented appropriately and then sutured into place.  The secondary defect was then repaired using a primary closure.
Composite Graft Text: The defect edges were debeveled with a #15 scalpel blade.  Given the location of the defect, shape of the defect, the proximity to free margins and the fact the defect was full thickness a composite graft was deemed most appropriate.  The defect was outline and then transferred to the donor site.  A full thickness graft was then excised from the donor site. The graft was then placed in the primary defect, oriented appropriately and then sutured into place.  The secondary defect was then repaired using a primary closure.
Epidermal Autograft Text: The defect edges were debeveled with a #15 scalpel blade.  Given the location of the defect, shape of the defect and the proximity to free margins an epidermal autograft was deemed most appropriate.  Using a sterile surgical marker, the primary defect shape was transferred to the donor site. The epidermal graft was then harvested.  The skin graft was then placed in the primary defect and oriented appropriately.
Dermal Autograft Text: The defect edges were debeveled with a #15 scalpel blade.  Given the location of the defect, shape of the defect and the proximity to free margins a dermal autograft was deemed most appropriate.  Using a sterile surgical marker, the primary defect shape was transferred to the donor site. The area thus outlined was incised deep to adipose tissue with a #15 scalpel blade.  The harvested graft was then trimmed of adipose and epidermal tissue until only dermis was left.  The skin graft was then placed in the primary defect and oriented appropriately.
Skin Substitute Text: The defect edges were debeveled with a #15 scalpel blade.  Given the location of the defect, shape of the defect and the proximity to free margins a skin substitute graft was deemed most appropriate.  The graft material was trimmed to fit the size of the defect. The graft was then placed in the primary defect and oriented appropriately.
Tissue Cultured Epidermal Autograft Text: The defect edges were debeveled with a #15 scalpel blade.  Given the location of the defect, shape of the defect and the proximity to free margins a tissue cultured epidermal autograft was deemed most appropriate.  The graft was then trimmed to fit the size of the defect.  The graft was then placed in the primary defect and oriented appropriately.
Xenograft Text: The defect edges were debeveled with a #15 scalpel blade.  Given the location of the defect, shape of the defect and the proximity to free margins a xenograft was deemed most appropriate.  The graft was then trimmed to fit the size of the defect.  The graft was then placed in the primary defect and oriented appropriately.
Purse String (Simple) Text: Given the location of the defect and the characteristics of the surrounding skin a purse string closure was deemed most appropriate.  Undermining was performed circumfirentially around the surgical defect.  A purse string suture was then placed and tightened.
Purse String (Intermediate) Text: Given the location of the defect and the characteristics of the surrounding skin a purse string intermediate closure was deemed most appropriate.  Undermining was performed circumfirentially around the surgical defect.  A purse string suture was then placed and tightened.
Partial Purse String (Simple) Text: Given the location of the defect and the characteristics of the surrounding skin a simple purse string closure was deemed most appropriate.  Undermining was performed circumfirentially around the surgical defect.  A purse string suture was then placed and tightened. Wound tension only allowed a partial closure of the circular defect.
Partial Purse String (Intermediate) Text: Given the location of the defect and the characteristics of the surrounding skin an intermediate purse string closure was deemed most appropriate.  Undermining was performed circumfirentially around the surgical defect.  A purse string suture was then placed and tightened. Wound tension only allowed a partial closure of the circular defect.
Localized Dermabrasion With Wire Brush Text: The patient was draped in routine manner.  Localized dermabrasion using 3 x 17 mm wire brush was performed in routine manner to papillary dermis. This spot dermabrasion is being performed to complete skin cancer reconstruction. It also will eliminate the other sun damaged precancerous cells that are known to be part of the regional effect of a lifetime's worth of sun exposure. This localized dermabrasion is therapeutic and should not be considered cosmetic in any regard.
Tarsorrhaphy Text: A tarsorrhaphy was performed using Frost sutures.
Complex Repair And Flap Additional Text (Will Appearing After The Standard Complex Repair Text): The complex repair was not sufficient to completely close the primary defect. The remaining additional defect was repaired with the flap mentioned below.
Complex Repair And Graft Additional Text (Will Appearing After The Standard Complex Repair Text): The complex repair was not sufficient to completely close the primary defect. The remaining additional defect was repaired with the graft mentioned below.
Unique Flap 1 Name: Myocutaneous Island pedicle Flap
Unique Flap 2 Name: Peng Flap
Unique Flap 3 Name: Mercedes Flap
Unique Flap 4 Name: Banner Flap
Unique Flap 5 Name: tunneled myocutaneous flap
Unique Flap 6 Name: Serena-B?ch flap
Unique Flap 7 Name: Mustarde flap
Unique Flap 8 Name: East to West Flap
Unique Flap 1 Text: A decision was made to reconstruct the defect utilizing a myocutaneous Island pedicle Flap based on the levator labii superioris muscle.  A telfa template was made of the defect.  This telfa template was then used to outline the myocutaneous flap, based along the meilolabial fold.  The donor area for the pedicle flap was then injected with anesthesia.  The flap was excised through the skin and subcutaneous tissue down to the layer of the underlying musculature.  The myocutaneous flap was carefully excised within this deep plane to maintain its blood supply. Based on the muscle. The edges of the donor site were undermined.   The donor site was closed in a primary fashion to the point of transposition.  The pedicle was then transposed into position and sutured.  Once the flap was sutured into place, adequate blood supply was confirmed with blanching and refill.
Unique Flap 2 Text: A decision was made to reconstruct the defect utilizing a Peng Flap (Bilateral Advancement Rotation Flap). Given the location of the defect and the proximity to free margins, this flap was deemed most appropriate.  Using a sterile surgical marker, the appropriate rotation flaps were drawn incorporating the defect and placing the expected incisions within the relaxed skin tension lines where possible.    The area thus outlined was incised deep to adipose tissue with a #15 scalpel blade.  The skin margins were undermined to an appropriate distance in all directions utilizing iris scissors.
Unique Flap 3 Text: The defect edges were debeveled with a #15 scalpel blade.  Given the location of the defect, shape of the defect and the proximity to free margins a Mercedes (double advancement flap) was deemed most appropriate.  Using a sterile surgical marker, the appropriate transposition flaps were drawn incorporating the defect and placing the expected incisions within the relaxed skin tension lines where possible.    The area thus outlined was incised deep to adipose tissue with a #15 scalpel blade.  The skin margins were undermined to an appropriate distance in all directions utilizing iris scissors.  Hemostasis was achieved with electrocautery.  The flaps were then advanced into the defect and anchored with interrupted buried subcutaneous sutures.
Unique Flap 4 Text: The defect edges were debeveled with a #15 scalpel blade.  Given the location of the defect and the proximity to free margins a Banner transposition flap was deemed most appropriate.  Using a sterile surgical marker, an appropriate Banner transposition flap was drawn incorporating the defect.    The area thus outlined was incised deep to adipose tissue with a #15 scalpel blade.  The skin margins were undermined to an appropriate distance in all directions utilizing iris scissors.
Unique Flap 5 Text: A decision was made to reconstruct the defect utilizing a tunneled myocutaneous Island pedicle Flap based on the anterior auricularis muscle.  A telfa template was made of the defect.  This telfa template was then used to outline the myocutaneous flap, based along the preauricular fold.  The donor area for the pedicle flap was then injected with anesthesia.  The flap was excised through the skin and subcutaneous tissue down to the layer of the underlying musculature.  The myocutaneous flap was carefully excised within this deep plane to maintain its blood supply based on the muscle. The edges of the donor site were undermined.   The donor site was closed in a primary fashion to the point of transposition.  The pedicle was then transposed through a tunnel into position and sutured.  Once the flap was sutured into place, adequate blood supply was confirmed with blanching and refill.
Unique Flap 6 Text: A decision was made to reconstruct the defect utilizing an Anti-aging-B?ch Flap (Bilateral helical Advancement Rotation Flap). Given the location of the defect and the proximity to free margins, this flap was deemed most appropriate.  Using a sterile surgical marker, the appropriate flaps were drawn incorporating the defect and placing the expected incisions within the relaxed skin tension lines where possible.  The area thus outlined was incised deep to adipose tissue with a #15 scalpel blade.  The skin margins were undermined to an appropriate distance in all directions utilizing iris scissors. Cartilage was incorporated into the flap arms to maintain helical anatomy.
Unique Flap 7 Text: A decision was made to reconstruct the defect utilizing a Mustarde Flap (Advancement Rotation Flap). Given the location of the defect and the proximity to free margins, this flap was deemed most appropriate.  Using a sterile surgical marker, the appropriate rotation flap was drawn incorporating the defect and placing the expected incisions within the relaxed skin tension lines where possible.    The area thus outlined was incised deep to adipose tissue with a #15 scalpel blade.  The skin margins were undermined to an appropriate distance in all directions utilizing iris scissors. The flap was advanced and rotated under the eyelid with a sling created laterally to keep ectropion minimal.
Unique Flap 8 Text: A decision was made to reconstruct the defect utilizing an East to West Flap (Modified Burows Advancement Flap). Given the location of the defect and the proximity to free margins, this flap was deemed most appropriate.  Using a sterile surgical marker, the appropriate advancement flaps were drawn incorporating the defect and placing the expected incisions within the relaxed skin tension lines where possible.    The area thus outlined was incised deep to adipose tissue with a #15 scalpel blade.  The skin margins were undermined to an appropriate distance in all directions utilizing iris scissors. Minimal alar distortion was created with flap approximation.
Manual Repair Warning Statement: We plan on removing the manually selected variable below in favor of our much easier automatic structured text blocks found in the previous tab. We decided to do this to help make the flow better and give you the full power of structured data. Manual selection is never going to be ideal in our platform and I would encourage you to avoid using manual selection from this point on, especially since I will be sunsetting this feature. It is important that you do one of two things with the customized text below. First, you can save all of the text in a word file so you can have it for future reference. Second, transfer the text to the appropriate area in the Library tab. Lastly, if there is a flap or graft type which we do not have you need to let us know right away so I can add it in before the variable is hidden. No need to panic, we plan to give you roughly 6 months to make the change.
Same Histology In Subsequent Stages Text: The pattern and morphology of the tumor is as described in the first stage.
No Residual Tumor Seen Histology Text: There were no malignant cells seen in the sections examined.
Inflammation Suggestive Of Cancer Camouflage Histology Text: There was a dense lymphocytic infiltrate which prevented adequate histologic evaluation of adjacent structures.
Bcc Histology Text: There were numerous aggregates of basaloid cells.
Bcc Infiltrative Histology Text: There were numerous aggregates of basaloid cells demonstrating an infiltrative pattern.
Mart-1 - Positive Histology Text: MART-1 staining demonstrates areas of higher density and clustering of melanocytes with Pagetoid spread upwards within the epidermis. The surgical margins are positive for tumor cells.
Mart-1 - Negative Histology Text: MART-1 staining demonstrates a normal density and pattern of melanocytes along the dermal-epidermal junction. The surgical margins are negative for tumor cells.
Information: Selecting Yes will display possible errors in your note based on the variables you have selected. This validation is only offered as a suggestion for you. PLEASE NOTE THAT THE VALIDATION TEXT WILL BE REMOVED WHEN YOU FINALIZE YOUR NOTE. IF YOU WANT TO FAX A PRELIMINARY NOTE YOU WILL NEED TO TOGGLE THIS TO 'NO' IF YOU DO NOT WANT IT IN YOUR FAXED NOTE.

## 2022-05-09 ENCOUNTER — APPOINTMENT (RX ONLY)
Dept: URBAN - METROPOLITAN AREA CLINIC 36 | Facility: CLINIC | Age: 82
Setting detail: DERMATOLOGY
End: 2022-05-09

## 2022-05-09 DIAGNOSIS — Z85.828 PERSONAL HISTORY OF OTHER MALIGNANT NEOPLASM OF SKIN: ICD-10-CM

## 2022-05-09 PROCEDURE — ? SUTURE REMOVAL (GLOBAL PERIOD)

## 2022-05-09 PROCEDURE — 99024 POSTOP FOLLOW-UP VISIT: CPT

## 2022-05-09 ASSESSMENT — LOCATION SIMPLE DESCRIPTION DERM: LOCATION SIMPLE: RIGHT CHEEK

## 2022-05-09 ASSESSMENT — LOCATION ZONE DERM: LOCATION ZONE: FACE

## 2022-05-09 ASSESSMENT — LOCATION DETAILED DESCRIPTION DERM: LOCATION DETAILED: RIGHT MEDIAL MALAR CHEEK

## 2022-05-09 NOTE — PROCEDURE: SUTURE REMOVAL (GLOBAL PERIOD)
Detail Level: Detailed
Add 48463 Cpt? (Important Note: In 2017 The Use Of 84360 Is Being Tracked By Cms To Determine Future Global Period Reimbursement For Global Periods): yes

## 2022-05-10 ENCOUNTER — TELEPHONE (OUTPATIENT)
Dept: MEDICAL GROUP | Facility: PHYSICIAN GROUP | Age: 82
End: 2022-05-10
Payer: MEDICARE

## 2022-05-10 NOTE — TELEPHONE ENCOUNTER
Future Appointments       Provider Department Center    5/17/2022 12:00 PM VICTORIANO Montes [facility]    5/31/2022 11:30 AM VICTORIANO Montes [facility]    7/28/2022 11:00 AM VICTORIANO Montes [facility]        ESTABLISHED PATIENT PRE-VISIT PLANNING     Patient was contacted to complete PVP.     Note: Patient will not be contacted if there is no indication to call.     1.  Reviewed notes from the last few office visits within the medical group: Yes, LOV 04/25/2022    2.  If any orders were placed at last visit or intended to be done for this visit (i.e. 6 mos follow-up), do we have Results/Consult Notes?         •  Labs - Labs ordered, but not to be completed until 07/25/22.  Note: If patient appointment is for lab review and patient did not complete labs, check with provider if OK to reschedule patient until labs completed.       •  Imaging - Imaging was not ordered at last office visit.       •  Referrals - Referral ordered, patient has NOT been seen. I called pt and pt's daughter pt has not been seen by Wound Care yet. I gave Reina (daughter) the phone number to call and schedule appointment for [place].     3. Is this appointment scheduled as a Hospital Follow-Up? No    4.  Immunizations were updated in Epic using Reconcile Outside Information activity? Yes    5.  Patient is due for the following Health Maintenance Topics:   Health Maintenance Due   Topic Date Due   • IMM ZOSTER VACCINES (1 of 2) Never done   • IMM DTaP/Tdap/Td Vaccine (2 - Td or Tdap) 05/15/2022     6.  AHA (Pulse8) form printed for Provider? N/A   Pt and pt's daughter Reina were reminded to arrive 15 minutes prior to scheduled appointment.

## 2022-05-15 NOTE — PATIENT INSTRUCTIONS
Remove old dressing. Try and schedule shower days with wound change days. Wash in shower. When you get out of shower, rinse with a little bit of saline. Cut a small piece of honey dressing and apply to wound bed. Cover with a small piece of tape or a bandaid - whichever works best for you. Change the dressing 1-2 times per week and as needed.     Should you experience any significant changes in your wound(s) such as infection (redness, swelling, localized heat, increased pain, fever >101 F, chills) or have any questions regarding your home care instructions, please contact the wound center [phone]. If after hours, contact your primary care physician or go the hospital emergency room.  Keep dressing clean and dry and cover while bathing. Only change dressing if over saturated, soiled or its falling off.        no

## 2022-05-18 ENCOUNTER — TELEPHONE (OUTPATIENT)
Dept: VASCULAR LAB | Facility: MEDICAL CENTER | Age: 82
End: 2022-05-18
Payer: MEDICARE

## 2022-05-18 NOTE — LETTER
Donte Magaña  [address] [place] [address]    05/18/22    Dear Donte Magaña ,    We have been unsuccessful in our attempts to contact you regarding your Anticoagulation Service appointments. Warfarin is a potent blood-thinning agent that requires monitoring to ensure that the dosage is correct for your body.  If it isn't, you could develop serious, sometimes life-threatening bleeding problems or life-threatening blood clots or stroke could result.    To monitor you effectively, we need to be able to communicate with you.  This is a requirement to be followed by our Service.       If you repeatedly fail to keep your lab appointments, you are at risk of being discharged from the Anticoagulation Service.    It is extremely important that you contact the clinic as soon as possible to arrange appropriate follow up.  We are open Monday-Friday 8 am until 5 pm.  You may reach our Service at [phone].           Sincerely,           Juan Blanco, PharmD, [facility]  Clinic Supervisor  [facility]  Outpatient Anticoagulation Service

## 2022-05-18 NOTE — TELEPHONE ENCOUNTER
Left message for pt to have INR checked  3rd call  2nd letter sent  Massiel Coleman, Clinical Pharmacist, CDE, CACP

## 2022-06-09 ENCOUNTER — OFFICE VISIT (OUTPATIENT)
Dept: MEDICAL GROUP | Facility: PHYSICIAN GROUP | Age: 82
End: 2022-06-09
Payer: MEDICARE

## 2022-06-09 ENCOUNTER — HOSPITAL ENCOUNTER (OUTPATIENT)
Dept: RADIOLOGY | Facility: MEDICAL CENTER | Age: 82
End: 2022-06-09
Attending: NURSE PRACTITIONER
Payer: MEDICARE

## 2022-06-09 VITALS
SYSTOLIC BLOOD PRESSURE: 122 MMHG | HEART RATE: 83 BPM | TEMPERATURE: 97.8 F | OXYGEN SATURATION: 97 % | DIASTOLIC BLOOD PRESSURE: 70 MMHG | BODY MASS INDEX: 32.27 KG/M2 | WEIGHT: 188 LBS

## 2022-06-09 DIAGNOSIS — R26.89 DECREASED MOBILITY: ICD-10-CM

## 2022-06-09 DIAGNOSIS — Z11.1 SCREENING-PULMONARY TB: ICD-10-CM

## 2022-06-09 PROCEDURE — 99213 OFFICE O/P EST LOW 20 MIN: CPT | Performed by: NURSE PRACTITIONER

## 2022-06-09 PROCEDURE — 71046 X-RAY EXAM CHEST 2 VIEWS: CPT

## 2022-06-09 RX ORDER — SOLIFENACIN SUCCINATE 5 MG/1
TABLET, FILM COATED ORAL
COMMUNITY
Start: 2022-03-27 | End: 2022-12-14

## 2022-06-09 ASSESSMENT — PATIENT HEALTH QUESTIONNAIRE - PHQ9
1. LITTLE INTEREST OR PLEASURE IN DOING THINGS: NOT AT ALL
9. THOUGHTS THAT YOU WOULD BE BETTER OFF DEAD, OR OF HURTING YOURSELF: NOT AT ALL
2. FEELING DOWN, DEPRESSED, IRRITABLE, OR HOPELESS: NOT AT ALL
8. MOVING OR SPEAKING SO SLOWLY THAT OTHER PEOPLE COULD HAVE NOTICED. OR THE OPPOSITE, BEING SO FIGETY OR RESTLESS THAT YOU HAVE BEEN MOVING AROUND A LOT MORE THAN USUAL: NOT AT ALL
SUM OF ALL RESPONSES TO PHQ QUESTIONS 1-9: 0
3. TROUBLE FALLING OR STAYING ASLEEP OR SLEEPING TOO MUCH: NOT AT ALL
7. TROUBLE CONCENTRATING ON THINGS, SUCH AS READING THE NEWSPAPER OR WATCHING TELEVISION: NOT AT ALL
6. FEELING BAD ABOUT YOURSELF - OR THAT YOU ARE A FAILURE OR HAVE LET YOURSELF OR YOUR FAMILY DOWN: NOT AL ALL
5. POOR APPETITE OR OVEREATING: NOT AT ALL
SUM OF ALL RESPONSES TO PHQ9 QUESTIONS 1 AND 2: 0
4. FEELING TIRED OR HAVING LITTLE ENERGY: NOT AT ALL

## 2022-06-09 ASSESSMENT — FIBROSIS 4 INDEX: FIB4 SCORE: 1.82

## 2022-06-09 NOTE — PROGRESS NOTES
"Subjective:     CC: mobility scooter    HPI:   Donte presents today with her grandson the following:    Decreased mobility  She is currently using a FWW with ambulation. She denies any recent falls. She would like to have a motorized scooter for mobility. She is unable to walk long distances without needing to stop. She needs to talk frequent breaks when walking. She needs help with mobility in her home to complete her ADL's. She is at increased risk for falls and complications if she were to have a fall due to her chronic medical conditions.       Past Medical History:   Diagnosis Date   • A-fib (HCC)    • Anesthesia     \"Tachycardia for 5 days after cataract surgery\"   • Anticoagulant long-term use 1/12/2012   • Arthritis     osteo-Knees, hips   • Atrial fibrillation ([facility])    • Backpain     R hip   • Bowel habit changes     diarrhea   • Breath shortness     with exertion, prn O2 2L   • Bronchitis Nov, 2013   • CAD (coronary artery disease)    • Depression    • Glaucoma 5/3/2011   • Hematoma complicating a procedure 11/3/2012   • Hemorrhagic disorder ([facility])     bruising/coumadin   • Hypertension    • Hypothyroid    • Lupus ([facility])    • Macular degeneration    • Menopause 1/12/2012   • Mitral regurgitation 10/30/2012   • Obesity 1/12/2012   • Pacemaker 2018   • Pneumonia feb,2013   • Pre-syncope 6/29/2018   • Pulmonary hypertension (HCC) 10/30/2012   • PVC's (premature ventricular contractions) 1/12/2012   • Senile nuclear sclerosis    • Spinal stenosis of lumbar region at multiple levels    • Unspecified cataract     repaired bilateral   • Unspecified urinary incontinence    • Urinary bladder disorder        Social History     Tobacco Use   • Smoking status: Never Smoker   • Smokeless tobacco: Never Used   Vaping Use   • Vaping Use: Never used   Substance Use Topics   • Alcohol use: No   • Drug use: No       Current Outpatient Medications Ordered in Epic   Medication Sig Dispense Refill   • levothyroxine (SYNTHROID) 50 " MCG Tab TAKE ONE TABLET BY MOUTH EVERY MORNING ON EMPTY STOMACH 30 Tablet 1   • warfarin (COUMADIN) 2.5 MG Tab TAKE ONE TO ONE AND ONE-HALF TABLETS BY MOUTH EVERY DAY AS DIRECTED BY THE [facility] 135 Tablet 1   • sertraline (ZOLOFT) 50 MG Tab TAKE ONE TABLET BY MOUTH EVERY DAY 90 Tablet 2   • Sennosides (SENOKOT PO) Take  by mouth.     • diphenhydrAMINE HCl (BENADRYL ALLERGY PO) Take  by mouth.     • atenolol (TENORMIN) 50 MG Tab Take 1 Tablet by mouth every day. TWICE DAILY 200 Tablet 3   • furosemide (LASIX) 40 MG Tab Take 1 Tablet by mouth every day. 90 Tablet 11   • lisinopril (PRINIVIL) 5 MG Tab TAKE ONE TABLET BY MOUTH EVERY DAY  100 tablet 3   • magnesium oxide (MAG-OX) 400 MG Tab tablet TAKE ONE TABLET BY MOUTH EVERY DAY  90 tablet 3   • Mirabegron ER (MYRBETRIQ) 50 MG TABLET SR 24 HR Take 50 mg by mouth every day. 90 Tab 3   • Acetaminophen 500 MG Cap Take 2 Caps by mouth 3 times a day as needed. Indications: Pain     • Lutein 20 MG Cap Take 1 Cap by mouth every day. 90 Cap 3   • vitamin D (CHOLECALCIFEROL) 1000 UNIT TABS Take 2,000 Units by mouth every day.     • estradiol (ESTRACE) 2 MG Tab TAKE ONE TABLET BY MOUTH ONCE DAILY 90 Tablet 0   • solifenacin (VESICARE) 5 MG tablet      • ipratropium (ATROVENT) 0.03 % Solution Administer 2 Sprays into affected nostril(S) every 12 hours. 30 mL 1   • PREBIOTIC PRODUCT PO Take 2 Tabs by mouth every day. (Patient not taking: Reported on 6/9/2022)       No current [facility]-ordered facility-administered medications on file.       Allergies:  Amiodarone, Bactrim, Cipro xr, Metoprolol, Morphine, Phytoplex z-guard [petrolatum-zinc oxide], Pseudoephedrine, Qvar [beclomethasone dipropionate], Vibramycin, Atorvastatin calcium-polysorbate 80, Augmentin, Diltiazem, Flecainide, Keflex, Mucinex, Tramadol, Atorvastatin, and Tape    Health Maintenance: Reviewed       Objective:     Vital signs reviewed  Exam:  /70 (BP Location: Left arm, Patient Position:  Sitting, BP Cuff Size: Adult)   Pulse 83   Temp 36.6 °C (97.8 °F) (Temporal)   Wt 85.3 kg (188 lb)   LMP 01/01/1993   SpO2 97%   BMI 32.27 kg/m²  Body mass index is 32.27 kg/m².    Gen: Alert and oriented, No apparent distress.  Eyes:   Lids normal. Glasses in place.   Neck: Neck is supple without lymphadenopathy.  Lungs: Normal effort, CTA bilaterally, no wheezes, rhonchi, or rales  CV: Regular rate and rhythm. No murmurs, rubs, or gallops.  Ext: No clubbing or cyanosis. BL edema 1+ no pitting. Using FWW with ambulation. 1+ bilateral pedal pulses.       Assessment & Plan:     81 y.o. female with the following -     1. Decreased mobility  Chronic stable problem.  Given her chronic medical conditions and decreased ability to walk I recommend that she have a motorized scooter. She is needing the scooter in her home to help do her ADL's.  Send over DME form to [employer] to see if we can get her a motorized scooter.    2. Screening-pulmonary TB  Acute uncomplicated problem. She is planning on going to assisted living and assisted living requesting screening for TB. Check chest x-ray.   - DX-CHEST-2 VIEWS; Future        Return for return when labs/imaging completed for assisted living paperwork.    Please note that this dictation was created using voice recognition software. I have made every reasonable attempt to correct obvious errors, but I expect that there are errors of grammar and possibly content that I did not discover before finalizing the note.

## 2022-06-09 NOTE — ASSESSMENT & PLAN NOTE
She is currently using a FWW with ambulation. She denies any recent falls. She would like to have a motorized scooter for mobility. She is unable to walk long distances without needing to stop. She needs to talk frequent breaks when walking. She needs help with mobility in her home to complete her ADL's. She is at increased risk for falls and complications if she were to have a fall due to her chronic medical conditions.

## 2022-06-13 RX ORDER — ESTRADIOL 2 MG/1
TABLET ORAL
Qty: 90 TABLET | Refills: 0 | Status: SHIPPED | OUTPATIENT
Start: 2022-06-13 | End: 2022-10-24

## 2022-06-13 NOTE — TELEPHONE ENCOUNTER
Requested Prescriptions     Signed Prescriptions Disp Refills   • estradiol (ESTRACE) 2 MG Tab 90 Tablet 0     Sig: TAKE ONE TABLET BY MOUTH ONCE DAILY     Authorizing Provider: NORBERT PARADA A.P.R.N.

## 2022-06-14 ENCOUNTER — HOSPITAL ENCOUNTER (OUTPATIENT)
Dept: LAB | Facility: MEDICAL CENTER | Age: 82
End: 2022-06-14
Attending: NURSE PRACTITIONER
Payer: MEDICARE

## 2022-06-14 ENCOUNTER — ANTICOAGULATION VISIT (OUTPATIENT)
Dept: VASCULAR LAB | Facility: MEDICAL CENTER | Age: 82
End: 2022-06-14
Attending: INTERNAL MEDICINE
Payer: MEDICARE

## 2022-06-14 ENCOUNTER — TELEPHONE (OUTPATIENT)
Dept: MEDICAL GROUP | Facility: PHYSICIAN GROUP | Age: 82
End: 2022-06-14

## 2022-06-14 VITALS
HEIGHT: 64 IN | SYSTOLIC BLOOD PRESSURE: 120 MMHG | HEART RATE: 80 BPM | WEIGHT: 188 LBS | DIASTOLIC BLOOD PRESSURE: 70 MMHG | BODY MASS INDEX: 32.1 KG/M2

## 2022-06-14 DIAGNOSIS — Z79.01 CHRONIC ANTICOAGULATION: ICD-10-CM

## 2022-06-14 DIAGNOSIS — Z13.220 SCREENING FOR LIPID DISORDERS: ICD-10-CM

## 2022-06-14 DIAGNOSIS — Z13.0 SCREENING FOR ENDOCRINE, METABOLIC AND IMMUNITY DISORDER: ICD-10-CM

## 2022-06-14 DIAGNOSIS — E03.9 ACQUIRED HYPOTHYROIDISM: ICD-10-CM

## 2022-06-14 DIAGNOSIS — Z13.29 SCREENING FOR ENDOCRINE, METABOLIC AND IMMUNITY DISORDER: ICD-10-CM

## 2022-06-14 DIAGNOSIS — Z13.228 SCREENING FOR ENDOCRINE, METABOLIC AND IMMUNITY DISORDER: ICD-10-CM

## 2022-06-14 LAB
ALBUMIN SERPL BCP-MCNC: 4.1 G/DL (ref 3.2–4.9)
ALBUMIN/GLOB SERPL: 1.2 G/DL
ALP SERPL-CCNC: 71 U/L (ref 30–99)
ALT SERPL-CCNC: 6 U/L (ref 2–50)
ANION GAP SERPL CALC-SCNC: 12 MMOL/L (ref 7–16)
AST SERPL-CCNC: 15 U/L (ref 12–45)
BASOPHILS # BLD AUTO: 1 % (ref 0–1.8)
BASOPHILS # BLD: 0.09 K/UL (ref 0–0.12)
BILIRUB SERPL-MCNC: 0.6 MG/DL (ref 0.1–1.5)
BUN SERPL-MCNC: 17 MG/DL (ref 8–22)
CALCIUM SERPL-MCNC: 9.6 MG/DL (ref 8.5–10.5)
CHLORIDE SERPL-SCNC: 102 MMOL/L (ref 96–112)
CHOLEST SERPL-MCNC: 180 MG/DL (ref 100–199)
CO2 SERPL-SCNC: 24 MMOL/L (ref 20–33)
CREAT SERPL-MCNC: 0.68 MG/DL (ref 0.5–1.4)
EOSINOPHIL # BLD AUTO: 0.36 K/UL (ref 0–0.51)
EOSINOPHIL NFR BLD: 4.1 % (ref 0–6.9)
ERYTHROCYTE [DISTWIDTH] IN BLOOD BY AUTOMATED COUNT: 53.4 FL (ref 35.9–50)
FASTING STATUS PATIENT QL REPORTED: NORMAL
GFR SERPLBLD CREATININE-BSD FMLA CKD-EPI: 86 ML/MIN/1.73 M 2
GLOBULIN SER CALC-MCNC: 3.3 G/DL (ref 1.9–3.5)
GLUCOSE SERPL-MCNC: 106 MG/DL (ref 65–99)
HCT VFR BLD AUTO: 47.7 % (ref 37–47)
HDLC SERPL-MCNC: 73 MG/DL
HGB BLD-MCNC: 15.2 G/DL (ref 12–16)
IMM GRANULOCYTES # BLD AUTO: 0.05 K/UL (ref 0–0.11)
IMM GRANULOCYTES NFR BLD AUTO: 0.6 % (ref 0–0.9)
INR PPP: 2.5 (ref 2–3.5)
LDLC SERPL CALC-MCNC: 89 MG/DL
LYMPHOCYTES # BLD AUTO: 2.23 K/UL (ref 1–4.8)
LYMPHOCYTES NFR BLD: 25.7 % (ref 22–41)
MCH RBC QN AUTO: 31.2 PG (ref 27–33)
MCHC RBC AUTO-ENTMCNC: 31.9 G/DL (ref 33.6–35)
MCV RBC AUTO: 97.9 FL (ref 81.4–97.8)
MONOCYTES # BLD AUTO: 0.62 K/UL (ref 0–0.85)
MONOCYTES NFR BLD AUTO: 7.1 % (ref 0–13.4)
NEUTROPHILS # BLD AUTO: 5.33 K/UL (ref 2–7.15)
NEUTROPHILS NFR BLD: 61.5 % (ref 44–72)
NRBC # BLD AUTO: 0 K/UL
NRBC BLD-RTO: 0 /100 WBC
PLATELET # BLD AUTO: 249 K/UL (ref 164–446)
PMV BLD AUTO: 10.8 FL (ref 9–12.9)
POTASSIUM SERPL-SCNC: 4.7 MMOL/L (ref 3.6–5.5)
PROT SERPL-MCNC: 7.4 G/DL (ref 6–8.2)
RBC # BLD AUTO: 4.87 M/UL (ref 4.2–5.4)
SODIUM SERPL-SCNC: 138 MMOL/L (ref 135–145)
TRIGL SERPL-MCNC: 91 MG/DL (ref 0–149)
TSH SERPL DL<=0.005 MIU/L-ACNC: 1.09 UIU/ML (ref 0.38–5.33)
WBC # BLD AUTO: 8.7 K/UL (ref 4.8–10.8)

## 2022-06-14 PROCEDURE — 85025 COMPLETE CBC W/AUTO DIFF WBC: CPT

## 2022-06-14 PROCEDURE — 84443 ASSAY THYROID STIM HORMONE: CPT

## 2022-06-14 PROCEDURE — 80061 LIPID PANEL: CPT

## 2022-06-14 PROCEDURE — 85610 PROTHROMBIN TIME: CPT

## 2022-06-14 PROCEDURE — 80053 COMPREHEN METABOLIC PANEL: CPT

## 2022-06-14 PROCEDURE — 36415 COLL VENOUS BLD VENIPUNCTURE: CPT

## 2022-06-14 PROCEDURE — 99211 OFF/OP EST MAY X REQ PHY/QHP: CPT

## 2022-06-14 ASSESSMENT — FIBROSIS 4 INDEX: FIB4 SCORE: 1.82

## 2022-06-14 NOTE — TELEPHONE ENCOUNTER
David at [employer] called and stated that because the notes in the chart stated that the PT can walk 200ft the power chair might now be covered.   He is stating that we may need to add an addendum to the order form stating that the pt needs this for their home.  He said he would call back with more information with what is actually needed from us.

## 2022-06-14 NOTE — PROGRESS NOTES
OP Anticoagulation Service Note    Date: 2022    Anticoagulation Summary  As of 2022    INR goal:  2.0-3.0   TTR:  74.6 % (7 y)   INR used for dosin.50 (2022)   Warfarin maintenance plan:  3.75 mg (2.5 mg x 1.5) every Tue; 2.5 mg (2.5 mg x 1) all other days   Weekly warfarin total:  18.75 mg   Plan last modified:  Fran Leyva, PharmD (2022)   Next INR check:  2022   Target end date:  Indefinite    Indications    Atrial fibrillation (HCC) (Resolved) [I48.91]  Chronic anticoagulation [Z79.01]             Anticoagulation Episode Summary     INR check location:  Home Draw    Preferred lab:      Send INR reminders to:      Comments:  Winston YEAGER      Anticoagulation Care Providers     Provider Role Specialty Phone number    Lizzy Haywood M.D. Referring Cardiovascular Disease (Cardiology) [phone]    [facility] Anticoagulation Services Responsible  [phone]    Peter HollowayD Responsible Pharmacy         Anticoagulation Patient Findings  Patient Findings     Negatives:  Signs/symptoms of thrombosis, Signs/symptoms of bleeding, Laboratory test error suspected, Change in health, Change in alcohol use, Change in activity, Upcoming invasive procedure, Emergency department visit, Upcoming dental procedure, Missed doses, Extra doses, Change in medications, Change in diet/appetite, Hospital admission, Bruising, Other complaints          HPI:     Donte Magaña is in the Anticoagulation Clinic today.     The reason for today's visit is to prevent morbidity and mortality from a blood clot and/or stroke and to reduce the risk of bleeding while on a anticoagulant.     PCP:  Rosi Mendoza, BEREPLeslyeRLeslyeN.  [address] [address]  [phone]    Lab Results   Component Value Date/Time    HBA1C 5.3 2019 07:57 PM      Lab Results   Component Value Date/Time    CHOLSTRLTOT 166 2020 11:56 AM    LDL 83 2020 11:56 AM    HDL 46 2020  11:56 AM    TRIGLYCERIDE 183 (H) 06/02/2020 11:56 AM       Lab Results   Component Value Date/Time    GLUCOSE 105 (H) 05/14/2021 11:35 AM    BUN 27 (H) 05/14/2021 11:35 AM    CREATININE 1.19 05/14/2021 11:35 AM    CREATININE 0.78 07/29/2010 12:00 AM     Lab Results   Component Value Date/Time    ALKPHOSPHAT 65 01/29/2021 11:12 AM    ASTSGOT 18 01/29/2021 11:12 AM    ALTSGPT 9 01/29/2021 11:12 AM    TBILIRUBIN 0.3 01/29/2021 11:12 AM    INR 2.50 06/14/2022 12:00 AM    ALBUMIN 4.1 01/29/2021 11:12 AM      Lab Results   Component Value Date/Time    RBC 4.58 01/29/2021 11:12 AM    RBC 4.57 07/29/2010 12:00 AM    HEMOGLOBIN 14.6 01/29/2021 11:12 AM    HEMATOCRIT 46.6 01/29/2021 11:12 AM    PLATELETCT 273 01/29/2021 11:12 AM      No results found for: MALBCRT, MICROALBUR    Current Outpatient Medications:   •  estradiol, TAKE ONE TABLET BY MOUTH ONCE DAILY  •  solifenacin,   •  levothyroxine, TAKE ONE TABLET BY MOUTH EVERY MORNING ON EMPTY STOMACH  •  warfarin, TAKE ONE TO ONE AND ONE-HALF TABLETS BY MOUTH EVERY DAY AS DIRECTED BY THE [facility]  •  sertraline, TAKE ONE TABLET BY MOUTH EVERY DAY  •  ipratropium, 2 Spray, Nasal, Q12HRS  •  Sennosides (SENOKOT PO), Take  by mouth.  •  diphenhydrAMINE HCl (BENADRYL ALLERGY PO), Take  by mouth.  •  atenolol, 50 mg, Oral, DAILY  •  furosemide, 40 mg, Oral, DAILY  •  lisinopril, TAKE ONE TABLET BY MOUTH EVERY DAY   •  magnesium oxide, TAKE ONE TABLET BY MOUTH EVERY DAY   •  Myrbetriq, 50 mg, Oral, DAILY  •  Acetaminophen, 2 Capsule, Oral, TID PRN  •  PREBIOTIC PRODUCT PO, 2 Tablet, Oral, DAILY (Patient not taking: Reported on 6/9/2022)  •  Lutein, 1 Capsule, Oral, DAILY  •  vitamin D, 2,000 Units, Oral, DAILY    Assessment:     INR  therapeutic.     Is pt on antiplatelet therapy: no    Is pt on NSAID: no    3 vitals included with today's appt-unless patient declined:  (BP, weight, ht, RR)   Vitals:    06/14/22 1323   BP: 120/70   Pulse: 80   Weight: 85.3 kg  "(188 lb)   Height: 1.626 m (5' 4\")       Plan:     • Continue the same warfarin dose, as noted above.       Follow-up:     • Test in 6 week(s).    • This decision was made using shared decision making with the pt and benefits vs risks were discussed.      Additional information discussed with patient:     • Pt educated to contact our clinic with any changes in medications or s/s of bleeding or thrombosis.  • Education was provided today regarding tips to reduce their bleed risk and dietary constraints while on a anticoagulant.    National recommendations regarding anticoagulation therapy:     The CHEST guidelines recommends frequent monitoring at regular intervals for any patient on a anticoagulant, to ensure the patient is on the proper dose, and to monitor that they are not having any complications from therapy.       Fran Leyva, PharmD, MS, BCACP, [facility]  Phone [phone] fax [phone]    This note was created using voice recognition software (Dragon). The accuracy of the dictation is limited by the abilities of the software. I have reviewed the note prior to signing, however some errors in grammar and context are still possible. If you have any questions related to this note please do not hesitate to contact our office.     "

## 2022-06-15 LAB — INR BLD: 2.5 (ref 0.9–1.2)

## 2022-06-20 ENCOUNTER — APPOINTMENT (RX ONLY)
Dept: URBAN - METROPOLITAN AREA CLINIC 36 | Facility: CLINIC | Age: 82
Setting detail: DERMATOLOGY
End: 2022-06-20

## 2022-06-20 DIAGNOSIS — Z48.817 ENCOUNTER FOR SURGICAL AFTERCARE FOLLOWING SURGERY ON THE SKIN AND SUBCUTANEOUS TISSUE: ICD-10-CM

## 2022-06-20 PROCEDURE — ? POST-OP WOUND CHECK

## 2022-06-20 ASSESSMENT — LOCATION ZONE DERM: LOCATION ZONE: FACE

## 2022-06-20 ASSESSMENT — LOCATION SIMPLE DESCRIPTION DERM: LOCATION SIMPLE: RIGHT CHEEK

## 2022-06-20 ASSESSMENT — LOCATION DETAILED DESCRIPTION DERM: LOCATION DETAILED: RIGHT CENTRAL MALAR CHEEK

## 2022-06-20 NOTE — PROCEDURE: MIPS QUALITY
Quality 431: Preventive Care And Screening: Unhealthy Alcohol Use - Screening: Patient not identified as an unhealthy alcohol user when screened for unhealthy alcohol use using a systematic screening method
Detail Level: Detailed
Quality 226: Preventive Care And Screening: Tobacco Use: Screening And Cessation Intervention: Patient screened for tobacco use and is an ex/non-smoker
Quality 111:Pneumonia Vaccination Status For Older Adults: Pneumococcal vaccine administered on or after patient’s 60th birthday and before the end of the measurement period
Quality 130: Documentation Of Current Medications In The Medical Record: Current Medications Documented

## 2022-06-20 NOTE — PROCEDURE: POST-OP WOUND CHECK
Detail Level: Detailed
Add 72542 Cpt? (Important Note: In 2017 The Use Of 89096 Is Being Tracked By Cms To Determine Future Global Period Reimbursement For Global Periods): no
Wound Evaluated By: Silvia Brock MA

## 2022-06-27 ENCOUNTER — TELEPHONE (OUTPATIENT)
Dept: MEDICAL GROUP | Facility: PHYSICIAN GROUP | Age: 82
End: 2022-06-27
Payer: MEDICARE

## 2022-06-27 NOTE — TELEPHONE ENCOUNTER
VOICEMAIL  1. Caller Name: David  (krishna Falcon)                    Call Back Number: [phone]    2. Message: David left message requesting that you addend the note to state that patient is needing the scooter in her home to help do her ADL's. Medicare guidelines won't cover her scooter if she can walk 200 ft then stop. She has to need it in her home.     3. Patient approves office to leave a detailed voicemail/Moxtrahart message: N\A

## 2022-06-28 NOTE — TELEPHONE ENCOUNTER
I faxed updated office note to David at [facility] at fax number [phone], confirmation report received.

## 2022-06-28 NOTE — TELEPHONE ENCOUNTER
Reviewed message. 6/9/2022 note was addended to relfect updates from [employer] for motorized wheel chair.

## 2022-06-29 ENCOUNTER — TELEPHONE (OUTPATIENT)
Dept: MEDICAL GROUP | Facility: PHYSICIAN GROUP | Age: 82
End: 2022-06-29
Payer: MEDICARE

## 2022-06-29 NOTE — TELEPHONE ENCOUNTER
Phone Number Called: [phone] (home)       Call outcome: called Donte regarding her scooter, she asked me to call her daughter Reina    Message: I called Reina, Donte's daughter and left her a message. David from [facility] has been trying to reach Donte to let her know she will need to complete a wheelchair evaluation at [facility]. I was unable to clearly hear the phone number that David left. I left Reinanathan Hernandez's phone number [phone] to follow up on her moms scooter.

## 2022-07-21 ENCOUNTER — OFFICE VISIT (OUTPATIENT)
Dept: WOUND CARE | Facility: MEDICAL CENTER | Age: 82
End: 2022-07-21
Attending: NURSE PRACTITIONER
Payer: MEDICARE

## 2022-07-21 ENCOUNTER — TELEPHONE (OUTPATIENT)
Dept: MEDICAL GROUP | Facility: PHYSICIAN GROUP | Age: 82
End: 2022-07-21

## 2022-07-21 VITALS
SYSTOLIC BLOOD PRESSURE: 123 MMHG | OXYGEN SATURATION: 96 % | RESPIRATION RATE: 16 BRPM | TEMPERATURE: 97.9 F | HEART RATE: 96 BPM | DIASTOLIC BLOOD PRESSURE: 69 MMHG

## 2022-07-21 DIAGNOSIS — L89.303 PRESSURE INJURY OF BUTTOCK, STAGE 3, UNSPECIFIED LATERALITY (HCC): ICD-10-CM

## 2022-07-21 DIAGNOSIS — E66.09 OBESITY DUE TO EXCESS CALORIES, UNSPECIFIED CLASSIFICATION, UNSPECIFIED WHETHER SERIOUS COMORBIDITY PRESENT: ICD-10-CM

## 2022-07-21 DIAGNOSIS — R54 AGE-RELATED PHYSICAL DEBILITY: ICD-10-CM

## 2022-07-21 PROCEDURE — 11042 DBRDMT SUBQ TIS 1ST 20SQCM/<: CPT | Performed by: NURSE PRACTITIONER

## 2022-07-21 PROCEDURE — 99213 OFFICE O/P EST LOW 20 MIN: CPT | Mod: 25

## 2022-07-21 PROCEDURE — 99213 OFFICE O/P EST LOW 20 MIN: CPT | Mod: 25 | Performed by: NURSE PRACTITIONER

## 2022-07-21 PROCEDURE — 11042 DBRDMT SUBQ TIS 1ST 20SQCM/<: CPT

## 2022-07-21 ASSESSMENT — ENCOUNTER SYMPTOMS
SHORTNESS OF BREATH: 1
WEAKNESS: 1
NAUSEA: 0
BACK PAIN: 1
DIARRHEA: 0
CHILLS: 0
VOMITING: 0
CLAUDICATION: 0
COUGH: 0
FEVER: 0

## 2022-07-21 NOTE — PROGRESS NOTES
Provider Encounter- Pressure Injury        HISTORY OF PRESENT ILLNESS  Wound History:    START OF CARE IN CLINIC: 7/21/2022    REFERRING PROVIDER: Rosi Mendoza      WOUND- Pressure Injury.    LOCATION and STAGE: Right medial buttock, stage III   HISTORY: Patient is well-known to this clinic from previous treatment of pressure injuries to the same location.  She was last seen here in August 2021, at which time her wounds healed.  She is unsure when her wound reopened.  Her mobility is severely limited due to spinal stenosis.  She spends most of her time in her recliner chair, including at night when she sleeps.  She does have a Roho cushion in her chair, but states she is unable to shift her weight frequently as was previously recommended.  She is still living on her own home, but her daughter comes by periodically to help.  She is currently trying to sell her home so that she can move into an assisted living facility.  She is unable to get home health services because she [place], [place] out [place] [facility].      Pertinent Medical History: Obesity, spinal stenosis, limited mobility, CAD, bilateral knee replacements, right hip replacement   Contributing factors: Immobility, Activity level and Obesity    Personal support: Family     TOBACCO USE:   She has never smoked or use smokeless tobacco    Patient's problem list, allergies, and current medications reviewed and updated in Epic    Interval History:  7/21/2022 Initial clinic visit with PHYLLIS Holguin, FNJARRELL-BC, CWOCN, CFCN.  Donte is very well-known to me from previous clinic visits.  She presents today unaccompanied by family.  She states she is living alone, but her daughter helps out when she can.  She does not drive, but she thinks she can make it into the clinic 1 time per week.  She is currently trying to sell her home so that she can move into an assisted living facility.      REVIEW OF SYSTEMS:   Review of Systems   Constitutional:  Positive for malaise/fatigue. Negative for chills and fever.   HENT: Positive for hearing loss.    Respiratory: Positive for shortness of breath. Negative for cough.         Become short of breath with minimal activity, such as walking.  She does not wear supplemental oxygen   Cardiovascular: Positive for leg swelling. Negative for chest pain and claudication.        She said swelling in her legs for many years   Gastrointestinal: Negative for diarrhea, nausea and vomiting.   Genitourinary: Positive for urgency.        Reports she has urgency sometimes, especially in the morning   Musculoskeletal: Positive for back pain and joint pain.        Chronic back and joint pain   Neurological: Positive for weakness.        Unable to tolerate much activity, reports that she sits in a chair most of the day and night       PHYSICAL EXAMINATION:   /69   Pulse 96   Temp 36.6 °C (97.9 °F) (Temporal)   Resp 16   LMP 01/01/1993   SpO2 96%     Physical Exam  Constitutional:       Appearance: She is obese.   HENT:      Head: Normocephalic.   Eyes:      Pupils: Pupils are equal, round, and reactive to light.   Cardiovascular:      Rate and Rhythm: Normal rate.   Pulmonary:      Effort: Pulmonary effort is normal.   Musculoskeletal:      Right lower leg: Edema present.      Left lower leg: Edema present.      Comments: 2+ pitting edema bilateral lower extremities   Skin:     Comments: Stage III pressure injury to right medial buttock, thick edges, slough at base of wound, tender to touch  Scarring to left medial buttock, site of previous pressure injury  Further wound flowsheet and photos   Neurological:      Mental Status: She is alert.         WOUND ASSESSMENT  Wound 07/21/22 Full Thickness Wound Buttocks Right --Right Buttock (Active)   Wound Image    07/21/22 1330   Site Assessment Red;Pink;White 07/21/22 1330   Periwound Assessment Dry;Ecchymosis 07/21/22 1330   Margins Epibole (rolled edges) 07/21/22 1330   Drainage  Amount KESHA 07/21/22 1330   Drainage Description KESHA 07/21/22 1330   Treatments Cleansed;Topical Lidocaine;Provider debridement 07/21/22 1330   Wound Cleansing Normal Saline Irrigation 07/21/22 1330   Periwound Protectant Skin Protectant Wipes to Periwound;Barrier Paste 07/21/22 1330   Dressing Cleansing/Solutions Not Applicable 07/21/22 1330   Dressing Options Hydrofiber Silver;Mepilex 07/21/22 1330   Dressing Changed New 07/21/22 1330   Non-staged Wound Description Full thickness 07/21/22 1330   Wound Length (cm) 1.3 cm 07/21/22 1330   Wound Width (cm) 1 cm 07/21/22 1330   Wound Depth (cm) 0.3 cm 07/21/22 1330   Wound Surface Area (cm^2) 1.3 cm^2 07/21/22 1330   Wound Volume (cm^3) 0.39 cm^3 07/21/22 1330   Post-Procedure Length (cm) 1.3 cm 07/21/22 1330   Post-Procedure Width (cm) 1.5 cm 07/21/22 1330   Post-Procedure Depth (cm) 0.3 cm 07/21/22 1330   Post-Procedure Surface Area (cm^2) 1.95 cm^2 07/21/22 1330   Post-Procedure Volume (cm^3) 0.585 cm^3 07/21/22 1330   Tunneling (cm) 0 cm 07/21/22 1330   Undermining (cm) 0 cm 07/21/22 1330   Wound Odor None 07/21/22 1330   Exposed Structures None 07/21/22 1330   Number of days: 0       PROCEDURE:   -2% viscous lidocaine applied topically to wound bed for approximately 5 minutes prior to debridement  -Scissors and curette used to debride wound bed and open closed wound edges.  Excisional debridement was performed to remove devitalized tissue until healthy, bleeding tissue was visualized.   Entire surface of wound, 1.95 cm2 debrided.  Tissue debrided into the subcutaneous layer.    -Bleeding controlled with manual pressure.    -Wound care completed by wound RN, refer to flowsheet  -Patient tolerated the procedure well, without c/o pain or discomfort.        Pertinent Labs and Diagnostics:    Labs:     A1c:   Lab Results   Component Value Date/Time    HBA1C 5.3 02/13/2019 07:57 PM          IMAGING: No results found.    VASCULAR STUDIES: No results found.    LAST  WOUND  CULTURE: No results found for: CULTRSULT       ASSESSMENT AND PLAN:   1. Pressure injury of buttock, stage 3, unspecified laterality (HCC)  Comments: Recurring ulcer to right medial buttock.  Complicated by obesity, limited mobility, and physical debility.  Patient spends most of her time in a recliner chair    7/21/2022: Patient presents today with full-thickness ulcer to right medial gluteus, edges are thick and rolled, preventing epithelialization.  -Excisional debridement of wound in clinic today, medically necessary to promote wound healing.  -Patient to return to clinic weekly for assessment and debridement  -Patient is physically unable to change her own dressing, and lives in an area where she cannot get home health services.  Therefore, once her dressing falls off, she has been instructed to apply barrier paste such as zinc or Desitin, to the area several times per day  -She is to try shifting her weight while in her chair, encouraged to try get out of her chair more often.    Wound care: Silver Hydrofiber to manage exudate and bioburden, sacral Mepilex to reduce pressure, friction and shear    2. Age-related physical debility  Comments: Patient is quite elderly and living on her own.  Has mobility issues, spends most of her time in a recliner chair.    3. Obesity due to excess calories, unspecified classification, unspecified whether serious comorbidity present  Comments: Complicating factor.  Impaired wound healing potential.          PATIENT EDUCATION  -Etiology of pressure injury  -Importance of offloading and frequent repositioning  -Strategies for offloading in bed and when seated discussed and demonstrated  - Importance of adequate nutrition for wound healing  - Advised to go to ER for any increased redness, swelling, drainage or odor, or if patient develops fever, chills, nausea or vomiting.    20 min spent face to face with patient.  Time spent counseling, coordinating care, reviewing records,  discussing POC, educating patient regarding wound healing and progression, offloading and frequent repositioning, nutrition.        Please note that this note may have been created using voice recognition software. I have worked with technical experts from [facility] to optimize the interface.  I have made every reasonable attempt to correct obvious errors, but there may be errors of grammar and possibly content that I did not discover before finalizing the note.

## 2022-07-21 NOTE — TELEPHONE ENCOUNTER
Future Appointments       Provider Department Center    7/28/2022 11:00 AM VICTORIANO Montes [facility]        ESTABLISHED PATIENT PRE-VISIT PLANNING     Patient was contacted to complete PVP.     Note: Patient will not be contacted if there is no indication to call.  Pt's daughter Reina was contacted.    1.  Reviewed notes from the last few office visits within the medical group: Yes, LOV 06/09/22    2.  If any orders were placed at last visit or intended to be done for this visit (i.e. 6 mos follow-up), do we have Results/Consult Notes?         •  Labs - Labs ordered, completed on 06/14/2022 and results are in chart.  Note: If patient appointment is for lab review and patient did not complete labs, check with provider if OK to reschedule patient until labs completed.       •  Imaging - Imaging ordered, completed and results are in chart.       •  Referrals - Referral ordered, patient has NOT been seen. Pt's daughter Reina said her mom Donte was seen at the INR clinic at [facility].     3. Is this appointment scheduled as a Hospital Follow-Up? No    4.  Immunizations were updated in Epic using Reconcile Outside Information activity? Yes    5.  Patient is due for the following Health Maintenance Topics:   Health Maintenance Due   Topic Date Due   • IMM ZOSTER VACCINES (1 of 2) Never done   • IMM DTaP/Tdap/Td Vaccine (2 - Td or Tdap) 05/15/2022   • BONE DENSITY  08/09/2022     6.  AHA (Pulse[employer]) form printed for Provider? N/A   Pt's daughter was reminded of check in time.

## 2022-07-21 NOTE — TELEPHONE ENCOUNTER
Phone Number Called: [phone] (home)       Call outcome: Spoke to patient regarding message below.    Message: Spoke to Donte's daughter Reina regarding patient's scooter there was another message sent from [employer]. Reina said her mother has appointment for the wheelchair evaluation with Dr. Haji.

## 2022-07-21 NOTE — PATIENT INSTRUCTIONS
-Should you experience any significant changes in your wound, such as signs of infection (increasing redness, swelling, localized heat, increased pain, fever > 101 F, chills) or have any questions regarding your home care instructions, please contact the wound center at [phone]. If after hours, contact your primary care physician or go to the hospital emergency room.     -If you are 5 or more minutes late for an appointment, we reserve the right to cancel and reschedule that appointment. Additionally, if you are habitually late or not showing (3 late cancellations and/or no shows), we reserve the right to cancel your remaining appointments and it will be your responsibility to obtain a new referral if services are still needed.

## 2022-07-28 ENCOUNTER — NON-PROVIDER VISIT (OUTPATIENT)
Dept: WOUND CARE | Facility: MEDICAL CENTER | Age: 82
End: 2022-07-28
Attending: NURSE PRACTITIONER
Payer: MEDICARE

## 2022-07-28 PROCEDURE — 97597 DBRDMT OPN WND 1ST 20 CM/<: CPT

## 2022-07-28 NOTE — PATIENT INSTRUCTIONS
Apply zinc cream (such as desitin) 3 to 4 times per day and as needed to keep wound coated at all times.  Try to shift your position and relieve pressure from the area.    -Should you experience any significant changes in your wound(s), such as infection (redness, swelling, localized heat, increased pain, fever > 101 F, chills) or have any questions regarding your home care instructions, please contact the wound center at [phone]. If after hours, contact your primary care physician or go to the hospital emergency room.

## 2022-07-28 NOTE — PROCEDURES
CSWD using forceps and scissors to remove ~1cm2 nonviable tissue from right buttock wound and periwound.  Lidocaine applied prior to debridement with ~5 minute dwell time.  Pt tolerated well, although did report some pain with procedure.

## 2022-08-04 ENCOUNTER — APPOINTMENT (OUTPATIENT)
Dept: WOUND CARE | Facility: MEDICAL CENTER | Age: 82
End: 2022-08-04
Attending: NURSE PRACTITIONER
Payer: MEDICARE

## 2022-08-11 ENCOUNTER — OFFICE VISIT (OUTPATIENT)
Dept: WOUND CARE | Facility: MEDICAL CENTER | Age: 82
End: 2022-08-11
Attending: NURSE PRACTITIONER
Payer: MEDICARE

## 2022-08-11 ENCOUNTER — TELEPHONE (OUTPATIENT)
Dept: VASCULAR LAB | Facility: MEDICAL CENTER | Age: 82
End: 2022-08-11
Payer: MEDICARE

## 2022-08-11 VITALS
HEART RATE: 107 BPM | OXYGEN SATURATION: 93 % | RESPIRATION RATE: 17 BRPM | DIASTOLIC BLOOD PRESSURE: 83 MMHG | SYSTOLIC BLOOD PRESSURE: 156 MMHG | TEMPERATURE: 97.2 F

## 2022-08-11 DIAGNOSIS — L89.303 PRESSURE INJURY OF BUTTOCK, STAGE 3, UNSPECIFIED LATERALITY (HCC): Primary | ICD-10-CM

## 2022-08-11 DIAGNOSIS — R54 AGE-RELATED PHYSICAL DEBILITY: ICD-10-CM

## 2022-08-11 DIAGNOSIS — E66.09 OBESITY DUE TO EXCESS CALORIES, UNSPECIFIED CLASSIFICATION, UNSPECIFIED WHETHER SERIOUS COMORBIDITY PRESENT: ICD-10-CM

## 2022-08-11 PROCEDURE — 11042 DBRDMT SUBQ TIS 1ST 20SQCM/<: CPT | Performed by: NURSE PRACTITIONER

## 2022-08-11 PROCEDURE — 11042 DBRDMT SUBQ TIS 1ST 20SQCM/<: CPT

## 2022-08-11 NOTE — PATIENT INSTRUCTIONS
-Apply zinc barrier paste to bilateral buttocks at least three times daily.    -Should you experience any significant changes in your wound(s), such as infection (redness, swelling, localized heat, increased pain, fever > 101 F, chills) or have any questions regarding your home care instructions, please contact the wound center at [phone]. If after hours, contact your primary care physician or go to the hospital emergency room.

## 2022-08-11 NOTE — PROGRESS NOTES
Provider Encounter- Pressure Injury        HISTORY OF PRESENT ILLNESS  Wound History:    START OF CARE IN CLINIC: 7/21/2022    REFERRING PROVIDER: Rosi Mendoza      WOUND- Pressure Injury.    LOCATION and STAGE: Right medial buttock, stage III                                                       Left medial buttock, stage III-recurring.  First observed in clinic 8/11/2022   HISTORY: Patient is well-known to this clinic from previous treatment of pressure injuries to the same location.  She was last seen here in August 2021, at which time her wounds healed.  She is unsure when her wound reopened.  Her mobility is severely limited due to spinal stenosis.  She spends most of her time in her recliner chair, including at night when she sleeps.  She does have a Roho cushion in her chair, but states she is unable to shift her weight frequently as was previously recommended.  She is still living on her own home, but her daughter comes by periodically to help.  She is currently trying to sell her home so that she can move into an assisted living facility.  She is unable to get home health services because she lives remotely, [place] miles out [place] [facility].      Pertinent Medical History: Obesity, spinal stenosis, limited mobility, CAD, bilateral knee replacements, right hip replacement   Contributing factors: Immobility, Activity level and Obesity    Personal support: Family     TOBACCO USE:   She has never smoked or use smokeless tobacco    Patient's problem list, allergies, and current medications reviewed and updated in Epic    Interval History:  7/21/2022 Initial clinic visit with PHYLLIS Holguin, FNJARRELL-BC, CWOCN, CFCN.  Donte is very well-known to me from previous clinic visits.  She presents today unaccompanied by family.  She states she is living alone, but her daughter helps out when she can.  She does not drive, but she thinks she can make it into the clinic 1 time per week.  She is currently trying to  sell her home so that she can move into an assisted living facility.    8/11/2022 : Clinic visit with Kelly Ansari, APRN, FNP-BC, CWOCN, CFCN.   Donte states that she is feeling well for the most part.  She still has to sit for most of the day due to her physical debility.  she is still trying to sell her large house so that she can move closer to town, possibly to a senior living facility.  She has been applying barrier paste to her wound several times per day.  She presents today with reopening of pressure injury to her left medial buttock.  The depth of her right medial buttock wound has decreased in depth.      REVIEW OF SYSTEMS:   Unchanged from previous clinic visit on 7/21/2022    PHYSICAL EXAMINATION:   BP (!) 156/83 (BP Location: Left arm, Patient Position: Sitting) Comment: RN notified  Pulse (!) 107   Temp 36.2 °C (97.2 °F) (Temporal)   Resp 17   LMP 01/01/1993   SpO2 93%     Physical Exam  Constitutional:       Appearance: She is obese.   HENT:      Head: Normocephalic.   Eyes:      Pupils: Pupils are equal, round, and reactive to light.   Cardiovascular:      Rate and Rhythm: Normal rate.   Pulmonary:      Effort: Pulmonary effort is normal.   Musculoskeletal:      Right lower leg: Edema present.      Left lower leg: Edema present.      Comments: 2+ pitting edema bilateral lower extremities   Skin:     Comments: Stage III pressure injury to right medial buttock, depth has decreased, area about the same, minimal drainage  Stage II pressure injury to left medial buttock, recurring.  Presents has open wound today.  Shallow, with minimal serosanguineous drainage.  Further wound flowsheet and photos   Neurological:      Mental Status: She is alert.       WOUND ASSESSMENT  Wound 07/21/22 Pressure Injury Buttocks Bilateral resolving stage 3 (Active)   Wound Image   08/11/22 1300   Site Assessment Red;Pink;Light Purple 08/11/22 1300   Periwound Assessment Dry;Blanchable erythema 08/11/22 1300   Margins  Attached edges;Epibole (rolled edges) 08/11/22 1300   Drainage Amount KESHA 08/11/22 1300   Drainage Description KESHA 08/11/22 1300   Treatments Cleansed;Topical Lidocaine;Provider debridement;Site care 08/11/22 1300   Wound Cleansing Puracyn Fargo 08/11/22 1300   Periwound Protectant Barrier Paste 08/11/22 1300   Dressing Cleansing/Solutions Not Applicable 08/11/22 1300   Dressing Options Other (Comments) 08/11/22 1300   Dressing Changed Other (Comment) 08/11/22 1300   Dressing Status Open to Air 08/11/22 1300   Dressing Change/Treatment Frequency Other (Comments) 08/11/22 1300   WOUND NURSE ONLY - Pressure Injury Stage 3 08/11/22 1300   Non-staged Wound Description Not applicable 08/11/22 1300   Wound Length (cm) 2.5 cm 08/11/22 1300   Wound Width (cm) 4 cm 08/11/22 1300   Wound Depth (cm) 0.3 cm 07/21/22 1330   Wound Surface Area (cm^2) 10 cm^2 08/11/22 1300   Wound Volume (cm^3) 0.39 cm^3 07/21/22 1330   Post-Procedure Length (cm) 3 cm 08/11/22 1300   Post-Procedure Width (cm) 5 cm 08/11/22 1300   Post-Procedure Depth (cm) 0.1 cm 08/11/22 1300   Post-Procedure Surface Area (cm^2) 15 cm^2 08/11/22 1300   Post-Procedure Volume (cm^3) 1.5 cm^3 08/11/22 1300   Tunneling (cm) 0 cm 08/11/22 1300   Undermining (cm) 0 cm 08/11/22 1300   Wound Odor None 08/11/22 1300   Exposed Structures None 08/11/22 1300   Number of days: 21       PROCEDURE: Excisional debridement bilateral buttock wounds  -2% viscous lidocaine applied topically to wound bed for approximately 5 minutes prior to debridement  -Curette used to debride wound bed and to open closed wound edges.  Excisional debridement was performed to remove devitalized tissue until healthy, bleeding tissue was visualized.   Total area debrided approximately 3.5 cm².  Tissue debrided into the subcutaneous layer.    -Bleeding controlled with manual pressure.    -Wound care completed by wound RN, refer to flowsheet  -Patient tolerated the procedure well, without c/o pain or  discomfort.        Pertinent Labs and Diagnostics:    Labs:     A1c:   Lab Results   Component Value Date/Time    HBA1C 5.3 02/13/2019 07:57 PM            IMAGING: No results found.    VASCULAR STUDIES: No results found.    LAST  WOUND CULTURE: No results found for: KWASI       ASSESSMENT AND PLAN:   1. Pressure injury of buttock, stage 3, unspecified laterality (HCC)  Comments: Recurring ulcer to right medial buttock.  Complicated by obesity, limited mobility, and physical debility.  Patient spends most of her time in a recliner chair    8/11/2022: Depth of original wound has decreased.  However, she presents today with reopening of ulcer to her other buttock.  Healing is compromised by patient's limited mobility.  She spends most of her time in a recliner chair.  Physically unable to be more active.  -Excisional debridement of wounds in clinic today, medically necessary to promote wound healing.  -Patient to return to clinic in 2 weeks for assessment and debridement  -Patient is physically unable to change her own dressing, and lives in an area where she cannot get home health services.  She is to apply barrier paste several times per day to protect her skin.  -Reminded her to try shifting her weight frequently while in her chair, encouraged to try get out of her chair more often.    Wound care: Barrier paste    2. Age-related physical debility  Comments: Patient is quite elderly and living on her own.  Has mobility issues, spends most of her time in a recliner chair.    8/11/2022: Patient states that she may have a  for her house.  Currently she lives too far out of town to receive home health services.  Hopefully she will be able to move into town and will become eligible for home health services.    3. Obesity due to excess calories, unspecified classification, unspecified whether serious comorbidity present  Comments: Complicating factor.  Impaired wound healing potential.          PATIENT  EDUCATION  -Etiology of pressure injury  -Importance of offloading and frequent repositioning  -Strategies for offloading in bed and when seated discussed and demonstrated  - Importance of adequate nutrition for wound healing  - Advised to go to ER for any increased redness, swelling, drainage or odor, or if patient develops fever, chills, nausea or vomiting.    20 min spent face to face with patient.  Time spent counseling, coordinating care, reviewing records, discussing POC, educating patient regarding wound healing and progression, offloading and frequent repositioning, nutrition.        Please note that this note may have been created using voice recognition software. I have worked with technical experts from [facility] [employer] to optimize the interface.  I have made every reasonable attempt to correct obvious errors, but there may be errors of grammar and possibly content that I did not discover before finalizing the note.

## 2022-08-11 NOTE — TELEPHONE ENCOUNTER
Left message for pt to have INR checked  Massiel Filter, Clinical Pharmacist, CDE, CACP  Managed Care Pharmacist for [facility] and [facility]

## 2022-09-01 ENCOUNTER — OFFICE VISIT (OUTPATIENT)
Dept: WOUND CARE | Facility: MEDICAL CENTER | Age: 82
End: 2022-09-01
Attending: NURSE PRACTITIONER
Payer: MEDICARE

## 2022-09-01 VITALS
OXYGEN SATURATION: 95 % | DIASTOLIC BLOOD PRESSURE: 76 MMHG | SYSTOLIC BLOOD PRESSURE: 146 MMHG | RESPIRATION RATE: 16 BRPM | HEART RATE: 96 BPM | TEMPERATURE: 97.2 F

## 2022-09-01 DIAGNOSIS — R54 AGE-RELATED PHYSICAL DEBILITY: ICD-10-CM

## 2022-09-01 DIAGNOSIS — E66.09 OBESITY DUE TO EXCESS CALORIES, UNSPECIFIED CLASSIFICATION, UNSPECIFIED WHETHER SERIOUS COMORBIDITY PRESENT: ICD-10-CM

## 2022-09-01 DIAGNOSIS — L89.303 PRESSURE INJURY OF BUTTOCK, STAGE 3, UNSPECIFIED LATERALITY (HCC): ICD-10-CM

## 2022-09-01 PROCEDURE — 99213 OFFICE O/P EST LOW 20 MIN: CPT | Performed by: NURSE PRACTITIONER

## 2022-09-01 PROCEDURE — 99213 OFFICE O/P EST LOW 20 MIN: CPT

## 2022-09-01 NOTE — PROGRESS NOTES
Provider Encounter- Pressure Injury        HISTORY OF PRESENT ILLNESS  Wound History:    START OF CARE IN CLINIC: 7/21/2022    REFERRING PROVIDER: Rosi Mendoza      WOUND- Pressure Injury.    LOCATION and STAGE: Right medial buttock, stage III                                                       Left medial buttock, stage III-recurring.  First observed in clinic 8/11/2022   HISTORY: Patient is well-known to this clinic from previous treatment of pressure injuries to the same location.  She was last seen here in August 2021, at which time her wounds healed.  She is unsure when her wound reopened.  Her mobility is severely limited due to spinal stenosis.  She spends most of her time in her recliner chair, including at night when she sleeps.  She does have a Roho cushion in her chair, but states she is unable to shift her weight frequently as was previously recommended.  She is still living on her own home, but her daughter comes by periodically to help.  She is currently trying to sell her home so that she can move into an assisted living facility.  She is unable to get home health services because she lives remotely, [place] miles out on [facility].      Pertinent Medical History: Obesity, spinal stenosis, limited mobility, CAD, bilateral knee replacements, right hip replacement   Contributing factors: Immobility, Activity level and Obesity    Personal support: Family     TOBACCO USE:   She has never smoked or use smokeless tobacco    Patient's problem list, allergies, and current medications reviewed and updated in Epic    Interval History:  7/21/2022 Initial clinic visit with PHYLLIS Holguin, FNJARRELL-BC, CWOCN, CFCN.  Donte is very well-known to me from previous clinic visits.  She presents today unaccompanied by family.  She states she is living alone, but her daughter helps out when she can.  She does not drive, but she thinks she can make it into the clinic 1 time per week.  She is currently trying to  sell her home so that she can move into an assisted living facility.    8/11/2022 : Clinic visit with PHYLLIS Holguin FNP-BC, MARITZA, JAMES.   Donte states that she is feeling well for the most part.  She still has to sit for most of the day due to her physical debility.  she is still trying to sell her large house so that she can move closer to town, possibly to a senior living facility.  She has been applying barrier paste to her wound several times per day.  She presents today with reopening of pressure injury to her left medial buttock.  The depth of her right medial buttock wound has decreased in depth.    9/1/2022 : Clinic visit with PHYLLIS Holguin FNP-BC, MARITZA, JAMES.   Donte continues to feel well.  She presents today with complete healing of her wounds, and is discharged from [facility].  Preventive measures reviewed in clinic today.  Patient advised to continue to apply barrier paste several times per day, minimize sitting, and to use a proper offloading cushion when she is sitting.  She understands she is to return to the clinic if her wounds recur, and that she would need a new referral.  She believes her house has been sold, and she will be moving in closer to town, which should enable patient to make it to medical appointments more easily.      REVIEW OF SYSTEMS:   Unchanged from previous clinic visit on 8/11/2022    PHYSICAL EXAMINATION:   BP (!) 146/76 (BP Location: Right arm, Patient Position: Sitting)   Pulse 96   Temp 36.2 °C (97.2 °F) (Temporal)   Resp 16   LMP 01/01/1993   SpO2 95%     Physical Exam  Constitutional:       Appearance: She is obese.   HENT:      Head: Normocephalic.   Eyes:      Pupils: Pupils are equal, round, and reactive to light.   Cardiovascular:      Rate and Rhythm: Normal rate.   Pulmonary:      Effort: Pulmonary effort is normal.   Musculoskeletal:      Right lower leg: Edema present.      Left lower leg: Edema present.      Comments: 2+ pitting edema bilateral lower  extremities   Skin:     Comments: Resolved stage III pressure injuries to bilateral medial buttocks, skin fragile   Neurological:      Mental Status: She is alert.       WOUND ASSESSMENT           PROCEDURE: Excisional debridement bilateral buttock wounds  -2% viscous lidocaine applied topically to wound bed for approximately 5 minutes prior to debridement  -Curette used to debride wound bed and to open closed wound edges.  Excisional debridement was performed to remove devitalized tissue until healthy, bleeding tissue was visualized.   Total area debrided approximately 3.5 cm².  Tissue debrided into the subcutaneous layer.    -Bleeding controlled with manual pressure.    -Wound care completed by wound RN, refer to flowsheet  -Patient tolerated the procedure well, without c/o pain or discomfort.        Pertinent Labs and Diagnostics:    Labs:     A1c:   Lab Results   Component Value Date/Time    HBA1C 5.3 02/13/2019 07:57 PM            IMAGING: No results found.    VASCULAR STUDIES: No results found.    LAST  WOUND CULTURE: No results found for: CULTRSULT       ASSESSMENT AND PLAN:   1. Pressure injury of buttock, stage 3, unspecified laterality (HCC)  Comments: Recurring ulcer to right medial buttock.  Complicated by obesity, limited mobility, and physical debility.  Patient spends most of her time in a recliner chair    9/1/2022: Patient's wounds are resolved  -Discharge from [facility]  -Patient to return to clinic ASAP if wound recurs, or if she develops new wounds   -Preventive measures reviewed in clinic.  Patient to continue to apply barrier paste several times per day to affected areas, minimize walking, use appropriate pressure relief cushion when sitting    Wound care: Barrier paste    2. Age-related physical debility  Comments: Patient is quite elderly and living on her own.  Has mobility issues, spends most of her time in a recliner chair.    9/1/2022: Patient is in the process of selling her home, has a  potential .  Will be moving into what sounds like a senior living facility    3. Obesity due to excess calories, unspecified classification, unspecified whether serious comorbidity present  Comments: Complicating factor.  Impaired wound healing potential.          PATIENT EDUCATION  -Etiology of pressure injury  -Importance of offloading and frequent repositioning  -Strategies for offloading in bed and when seated discussed and demonstrated  - Importance of adequate nutrition for wound healing  - Advised to go to ER for any increased redness, swelling, drainage or odor, or if patient develops fever, chills, nausea or vomiting.    My total time spent caring for the patient on the day of the encounter was 20 minutes.   This does not include time spent on separately billable procedures/tests.       Please note that this note may have been created using voice recognition software. I have worked with technical experts from [employer] to optimize the interface.  I have made every reasonable attempt to correct obvious errors, but there may be errors of grammar and possibly content that I did not discover before finalizing the note.

## 2022-09-01 NOTE — PATIENT INSTRUCTIONS
- Resolved wound be fragile for a few days, bathe and dry area gently, only ever regains a maximum of 80% of the tensile strength of the surrounding skin, remodeling of scar can continue for 6mo - a year. Contact PCP for a referral back to wound care if any problems with area opening and draining again.    -Should you experience any significant changes in your wound(s), such as infection (redness, swelling, localized heat, increased pain, fever > 101 F, chills) or have any questions regarding your home care instructions, please contact the wound center at [phone]. If after hours, contact your primary care physician or go to the hospital emergency room.

## 2022-09-01 NOTE — CERTIFICATION
Advanced Wound Care   [facility]   [address]   [address]   [place] [address]   [phone] Fax: [phone]    Discharge Note      Referring Physician: Rosi FERGUSON  Wound Etiology: pressure  Wound location: bilateral buttocks  Date of Discharge: 09/01/2022    Assessment:  Discharge patient at this time secondary to wound resolution.    Thank you for the referral and the opportunity to treat your patient.      Clinician Signature: _____________________________ Date:_______________

## 2022-09-06 DIAGNOSIS — E03.8 OTHER SPECIFIED HYPOTHYROIDISM: ICD-10-CM

## 2022-09-07 RX ORDER — LEVOTHYROXINE SODIUM 0.05 MG/1
50 TABLET ORAL
Qty: 90 TABLET | Refills: 3 | Status: SHIPPED | OUTPATIENT
Start: 2022-09-07 | End: 2023-10-03 | Stop reason: SDUPTHER

## 2022-09-07 NOTE — TELEPHONE ENCOUNTER
Requested Prescriptions     Signed Prescriptions Disp Refills    levothyroxine (SYNTHROID) 50 MCG Tab 90 Tablet 3     Sig: Take 1 Tablet by mouth every morning on an empty stomach.     Authorizing Provider: ONRBERT PARADA A.P.R.N.

## 2022-09-12 ENCOUNTER — NON-PROVIDER VISIT (OUTPATIENT)
Dept: CARDIOLOGY | Facility: MEDICAL CENTER | Age: 82
End: 2022-09-12
Payer: MEDICARE

## 2022-09-12 PROCEDURE — 93294 REM INTERROG EVL PM/LDLS PM: CPT | Performed by: INTERNAL MEDICINE

## 2022-09-12 NOTE — CARDIAC REMOTE MONITOR - SCAN
Device transmission reviewed. Device demonstrated appropriate function.       Electronically Signed by: Duke Marquez M.D.    9/21/2022  7:58 AM

## 2022-09-23 ENCOUNTER — DOCUMENTATION (OUTPATIENT)
Dept: HEALTH INFORMATION MANAGEMENT | Facility: OTHER | Age: 82
End: 2022-09-23
Payer: MEDICARE

## 2022-10-24 ENCOUNTER — OFFICE VISIT (OUTPATIENT)
Dept: MEDICAL GROUP | Facility: PHYSICIAN GROUP | Age: 82
End: 2022-10-24
Payer: MEDICARE

## 2022-10-24 VITALS
OXYGEN SATURATION: 96 % | DIASTOLIC BLOOD PRESSURE: 62 MMHG | HEART RATE: 96 BPM | TEMPERATURE: 97.7 F | HEIGHT: 63 IN | WEIGHT: 186 LBS | SYSTOLIC BLOOD PRESSURE: 124 MMHG | BODY MASS INDEX: 32.96 KG/M2

## 2022-10-24 DIAGNOSIS — Z23 NEED FOR VACCINATION: ICD-10-CM

## 2022-10-24 DIAGNOSIS — Z02.89 ENCOUNTER FOR COMPLETION OF FORM WITH PATIENT: ICD-10-CM

## 2022-10-24 DIAGNOSIS — H61.23 EXCESSIVE CERUMEN IN BOTH EAR CANALS: ICD-10-CM

## 2022-10-24 PROCEDURE — 90662 IIV NO PRSV INCREASED AG IM: CPT | Performed by: NURSE PRACTITIONER

## 2022-10-24 PROCEDURE — 99215 OFFICE O/P EST HI 40 MIN: CPT | Mod: 25 | Performed by: NURSE PRACTITIONER

## 2022-10-24 PROCEDURE — G0008 ADMIN INFLUENZA VIRUS VAC: HCPCS | Performed by: NURSE PRACTITIONER

## 2022-10-24 ASSESSMENT — FIBROSIS 4 INDEX: FIB4 SCORE: 2.041241452319315082

## 2022-10-24 NOTE — ASSESSMENT & PLAN NOTE
Patient comes in today with her daughter to have physicians report paperwork completed for upcoming move to [place] at [facility].  She is planning to move there this week and on 10/29/2022.  She also needs her POLST completed.  She has had a chest x-ray for TB screening on 6/9/2022 that was negative.  She has multiple allergies, 18 in total that we have listed and we will send over for allergies.  I have verified her medications and have the patient double check with her medications as well.  She is able to administer her own medications.  Daughter states that she recently has been able to get a motorized scooter that she will start using for longer distances.  She does use a four-wheel walker with ambulation.  She is able to take current medications and manage her own medications.  She is due for her influenza vaccine today.  Patient takes multiple medications for her atrial fibrillation, edema, hypertension, kidney disease, Macular degeneration, hypothyroid, depression, allergies and chronic urinary issues.  She is also on warfarin for atrial fibrillation that is managed with anticoagulation clinic.  Her blood pressure is at goal today.

## 2022-10-24 NOTE — PROGRESS NOTES
"Subjective:     CC: paperwork    HPI:   Donte presents today with her daughter the following:        Encounter for completion of form with patient  Patient comes in today with her daughter to have physicians report paperwork completed for upcoming move to [place] at [facility].  She is planning to move there this week and on 10/29/2022.  She also needs her POLST completed.  She has had a chest x-ray for TB screening on 6/9/2022 that was negative.  She has multiple allergies, 18 in total that we have listed and we will send over for allergies.  I have verified her medications and have the patient double check with her medications as well.  She is able to administer her own medications.  Daughter states that she recently has been able to get a motorized scooter that she will start using for longer distances.  She does use a four-wheel walker with ambulation.  She is able to take current medications and manage her own medications.  She is due for her influenza vaccine today.  Patient takes multiple medications for her atrial fibrillation, edema, hypertension, kidney disease, Macular degeneration, hypothyroid, depression, allergies and chronic urinary issues.  She is also on warfarin for atrial fibrillation that is managed with anticoagulation clinic.  Her blood pressure is at goal today.      Past Medical History:   Diagnosis Date    A-fib (HCC)     Anesthesia     \"Tachycardia for 5 days after cataract surgery\"    Anticoagulant long-term use 1/12/2012    Arthritis     osteo-Knees, hips    Atrial fibrillation (HCC)     Backpain     R hip    Bowel habit changes     diarrhea    Breath shortness     with exertion, prn O2 2L    Bronchitis Nov, 2013    CAD (coronary artery disease)     Depression     Glaucoma 5/3/2011    Hematoma complicating a procedure 11/3/2012    Hemorrhagic disorder (HCC)     bruising/coumadin    Hypertension     Hypothyroid     Lupus (HCC)     Macular degeneration     Menopause 1/12/2012    " Mitral regurgitation 10/30/2012    Obesity 1/12/2012    Pacemaker 2018    Pneumonia feb,2013    Pre-syncope 6/29/2018    Pulmonary hypertension (HCC) 10/30/2012    PVC's (premature ventricular contractions) 1/12/2012    Senile nuclear sclerosis     Spinal stenosis of lumbar region at multiple levels     Unspecified cataract     repaired bilateral    Unspecified urinary incontinence     Urinary bladder disorder        Social History     Tobacco Use    Smoking status: Never    Smokeless tobacco: Never   Vaping Use    Vaping Use: Never used   Substance Use Topics    Alcohol use: No    Drug use: No       Current Outpatient Medications Ordered in Epic   Medication Sig Dispense Refill    levothyroxine (SYNTHROID) 50 MCG Tab Take 1 Tablet by mouth every morning on an empty stomach. 90 Tablet 3    solifenacin (VESICARE) 5 MG tablet       warfarin (COUMADIN) 2.5 MG Tab TAKE ONE TO ONE AND ONE-HALF TABLETS BY MOUTH EVERY DAY AS DIRECTED BY THE [facility] 135 Tablet 1    sertraline (ZOLOFT) 50 MG Tab TAKE ONE TABLET BY MOUTH EVERY DAY 90 Tablet 2    ipratropium (ATROVENT) 0.03 % Solution Administer 2 Sprays into affected nostril(S) every 12 hours. 30 mL 1    Sennosides (SENOKOT PO) Take  by mouth.      diphenhydrAMINE HCl (BENADRYL ALLERGY PO) Take  by mouth.      atenolol (TENORMIN) 50 MG Tab Take 1 Tablet by mouth every day. TWICE DAILY 200 Tablet 3    furosemide (LASIX) 40 MG Tab Take 1 Tablet by mouth every day. 90 Tablet 11    lisinopril (PRINIVIL) 5 MG Tab TAKE ONE TABLET BY MOUTH EVERY DAY  100 tablet 3    magnesium oxide (MAG-OX) 400 MG Tab tablet TAKE ONE TABLET BY MOUTH EVERY DAY  90 tablet 3    Mirabegron ER (MYRBETRIQ) 50 MG TABLET SR 24 HR Take 50 mg by mouth every day. 90 Tab 3    Acetaminophen 500 MG Cap Take 2 Caps by mouth 3 times a day as needed. Indications: Pain      Lutein 20 MG Cap Take 1 Cap by mouth every day. 90 Cap 3    vitamin D (CHOLECALCIFEROL) 1000 UNIT TABS Take 2,000 Units by  "mouth every day.       No current [facility]-ordered facility-administered medications on file.       Allergies:  Amiodarone, Bactrim, Cipro xr, Metoprolol, Morphine, Phytoplex z-guard [petrolatum-zinc oxide], Pseudoephedrine, Qvar [beclomethasone dipropionate], Vibramycin, Atorvastatin calcium-polysorbate 80, Augmentin, Diltiazem, Flecainide, Keflex, Mucinex, Tramadol, Atorvastatin, and Tape    Health Maintenance: Reviewed.  She would like her influenza vaccine today.      Objective:     Vital signs reviewed  Exam:  /62 (BP Location: Right arm, Patient Position: Sitting, BP Cuff Size: Adult)   Pulse 96   Temp 36.5 °C (97.7 °F) (Temporal)   Ht 1.59 m (5' 2.6\")   Wt 84.4 kg (186 lb)   LMP 01/01/1993   SpO2 96%   BMI 33.37 kg/m²  Body mass index is 33.37 kg/m².      General: Normal appearing. No distress.  Daughter present in exam room.  HENT: Normocephalic. Ears normal shape and contour, canals with excessive cerumen and unable to visualize TM.  I did attempt to remove the cerumen with a lighted curette however the cerumen was dry, hard and patient was having pain.  We will have MA irrigate her ear. Nasal mucosa benign and oropharynx is without erythema, edema or exudates.   Eyes: Eyes conjunctiva clear lids without ptosis, pupils equal and reactive to light accommodation, lids normal.   Neck: Supple without JVD.  Pulmonary: Clear to ausculation.  Normal effort. No rales, ronchi, or wheezing.  Cardiovascular: Regular rate and rhythm without murmur. Left upper chest pacemaker in place.   Abdomen: Soft, nontender, nondistended. Normal bowel sounds.   Neurologic: Grossly nonfocal  Lymph: No cervical or supraclavicular lymph nodes are palpable  Skin: Warm and dry.  No obvious lesions.  Musculoskeletal: Normal gait. Using four wheel walker with ambulation.   Psych: Normal mood and affect. Alert and oriented x3. Judgment and insight is normal.      Assessment & Plan:     81 y.o. female with the following -       1. " Encounter for completion of form with patient  Acute uncomplicated problem.  We completed the physicians report form today as well as went over her POLST form with completion.  Numerous questions were asked and answered today.  We do not have a advanced directive uploaded to the patient's chart.  Patient's daughter did have available on her cell phone and is attempting to upload to patient's "[employer]"hart.  Patient and daughter informed that for the POLST form section for advanced directive to list the point and agent I would need to take the information from the advanced directive.  Daughter was able to upload images of patient's advanced directive.  POLST form completed today we will scanned into chart.    2. Excessive cerumen in both ear canals  Acute uncomplicated problem.  MA irrigate the patient's ears.  TMs are pearly gray with mild amount of bleeding to the right ear that is slowing down.  - Ear Irrigation (MA Only); Future    3. Need for vaccination  Acute uncomplicated problem.  She would like her influenza vaccine today. I have placed the below orders and discussed them with an approved delegating provider. The MA is performing the below orders under the direction of Dr. Smith.  - INFLUENZA VACCINE, HIGH DOSE (65+ ONLY)        My total time spent caring for the patient on the day of the encounter was 64 minutes.   This does not include time spent on separately billable procedures/tests.        Return if symptoms worsen or fail to improve.    Please note that this dictation was created using voice recognition software. I have made every reasonable attempt to correct obvious errors, but I expect that there are errors of grammar and possibly content that I did not discover before finalizing the note.

## 2022-10-31 ENCOUNTER — APPOINTMENT (OUTPATIENT)
Dept: URGENT CARE | Facility: PHYSICIAN GROUP | Age: 82
End: 2022-10-31
Payer: MEDICARE

## 2022-10-31 ENCOUNTER — PATIENT MESSAGE (OUTPATIENT)
Dept: MEDICAL GROUP | Facility: PHYSICIAN GROUP | Age: 82
End: 2022-10-31
Payer: MEDICARE

## 2022-10-31 DIAGNOSIS — Z11.1 SCREENING-PULMONARY TB: ICD-10-CM

## 2022-10-31 NOTE — PROGRESS NOTES
For patient's paperwork for group home updated warfarin prescription as well as ordered QuantiFERON gold test.

## 2022-11-01 ENCOUNTER — HOSPITAL ENCOUNTER (OUTPATIENT)
Dept: LAB | Facility: MEDICAL CENTER | Age: 82
End: 2022-11-01
Attending: NURSE PRACTITIONER
Payer: MEDICARE

## 2022-11-01 DIAGNOSIS — Z11.1 SCREENING-PULMONARY TB: ICD-10-CM

## 2022-11-01 PROCEDURE — 86480 TB TEST CELL IMMUN MEASURE: CPT

## 2022-11-01 PROCEDURE — 36415 COLL VENOUS BLD VENIPUNCTURE: CPT

## 2022-11-02 LAB
GAMMA INTERFERON BACKGROUND BLD IA-ACNC: 0.04 IU/ML
M TB IFN-G BLD-IMP: NEGATIVE
M TB IFN-G CD4+ BCKGRND COR BLD-ACNC: 0 IU/ML
MITOGEN IGNF BCKGRD COR BLD-ACNC: >10 IU/ML
QFT TB2 - NIL TBQ2: 0 IU/ML

## 2022-11-04 ENCOUNTER — DOCUMENTATION (OUTPATIENT)
Dept: HEALTH INFORMATION MANAGEMENT | Facility: OTHER | Age: 82
End: 2022-11-04
Payer: MEDICARE

## 2022-11-24 ENCOUNTER — HOSPITAL ENCOUNTER (EMERGENCY)
Facility: MEDICAL CENTER | Age: 82
End: 2022-11-24
Attending: STUDENT IN AN ORGANIZED HEALTH CARE EDUCATION/TRAINING PROGRAM
Payer: MEDICARE

## 2022-11-24 VITALS
BODY MASS INDEX: 35.44 KG/M2 | TEMPERATURE: 97.2 F | WEIGHT: 200 LBS | DIASTOLIC BLOOD PRESSURE: 73 MMHG | SYSTOLIC BLOOD PRESSURE: 156 MMHG | HEART RATE: 86 BPM | HEIGHT: 63 IN | OXYGEN SATURATION: 95 % | RESPIRATION RATE: 16 BRPM

## 2022-11-24 DIAGNOSIS — M54.50 LUMBAR BACK PAIN: ICD-10-CM

## 2022-11-24 PROCEDURE — 99284 EMERGENCY DEPT VISIT MOD MDM: CPT

## 2022-11-24 ASSESSMENT — FIBROSIS 4 INDEX: FIB4 SCORE: 2.07

## 2022-11-24 NOTE — ED NOTES
Pt off bed pan. Pts son in law here to pick pt up. Pt wheeled out in wheel chair. Pt verbalized understanding of d/c. No questions. Pts daughter aware as well.

## 2022-11-24 NOTE — DISCHARGE PLANNING
Medical Social Work     MATY called the pt daughter Reina at [phone] to clarify if the pt lives at [place] at [facility] and she confirmed the pt lives there. MATY advised Reina that we will be sending her back via [facility] and the pt daughter advised MATY that she will come  the pt and take her home. MATY notified the RN that the pt daughter or son in law will be picking up the pt.

## 2022-11-24 NOTE — ED TRIAGE NOTES
Pt brought in for increased lower right chronic back pain. At this time pt denies any back pain. Pt has hx of dementia, pacemaker. No other complaints at this time. Pt is Awake and alert.

## 2022-11-24 NOTE — DISCHARGE INSTRUCTIONS
If you develop any persistent back pain numbness tingling weakness in any extremities urinary tension loss of bowel or bladder control numbness in your groin return to the ER.  Tylenol as needed for back pain

## 2022-11-24 NOTE — ED PROVIDER NOTES
CHIEF COMPLAINT  Chief Complaint   Patient presents with    Back Pain       HPI  Donte Magaña is a 82 y.o. female who presents evaluation of acute onset back pain.  Patient is sure she was sleeping when she woke up with bilateral pain in her hips that lasted approximately 1 to 2 minutes was aching in nature then slowly resolved.  Patient denies any urinary retention loss of bowel or bladder control saddle anesthesias, no numbness tingling increased weakness in her legs.  Does have a history of chronic back pain.  Though currently states her pain is resolved and has no other complaints.    REVIEW OF SYSTEMS  See HPI for further details. All other systems are negative.     PAST MEDICAL HISTORY   has a past medical history of A-fib ([facility]), Anesthesia, Anticoagulant long-term use (1/12/2012), Arthritis, Atrial fibrillation ([facility]), Backpain, Bowel habit changes, Breath shortness, Bronchitis (Nov, 2013), CAD (coronary artery disease), Depression, Glaucoma (5/3/2011), Hematoma complicating a procedure (11/3/2012), Hemorrhagic disorder ([facility]), Hypertension, Hypothyroid, Lupus ([facility]), Macular degeneration, Menopause (1/12/2012), Mitral regurgitation (10/30/2012), Obesity (1/12/2012), Pacemaker (2018), Pneumonia (feb,2013), Pre-syncope (6/29/2018), Pulmonary hypertension ([facility]) (10/30/2012), PVC's (premature ventricular contractions) (1/12/2012), Senile nuclear sclerosis, Spinal stenosis of lumbar region at multiple levels, Unspecified cataract, Unspecified urinary incontinence, and Urinary bladder disorder.    SOCIAL HISTORY  Social History     Tobacco Use    Smoking status: Never    Smokeless tobacco: Never   Vaping Use    Vaping Use: Never used   Substance and Sexual Activity    Alcohol use: No    Drug use: No    Sexual activity: Not Currently     Partners: Male     Birth control/protection: Post-Menopausal       SURGICAL HISTORY   has a past surgical history that includes tonsillectomy and adenoidectomy; recovery  "(11/30/2010); colonoscopy (2008); abdominal hysterectomy total (April 15,1975); other; cataract phaco with iol (4/21/2015); cataract phaco with iol (Right, 5/5/2015); pacemaker insertion (06/30/2018); open reduction; other cardiac surgery (12/2017 and 07/19/2018 ); hip arth anterior total (Right, 1/17/2019); irrigation & debridement ortho (Right, 2/14/2019); combined ant/post colporrhaphy (1/14/2020); lap,diagnostic abdomen (1/14/2020); enterocele repair (1/14/2020); bladder sling female (1/14/2020); vaginal suspension (1/14/2020); salpingo oophorectomy (Bilateral, 1/14/2020); knee arthroplasty total (Left, 5/28/2015); and knee arthroplasty total (Right, 6/23/2016).    CURRENT MEDICATIONS  Home Medications    **Home medications have not yet been reviewed for this encounter**         ALLERGIES  Allergies   Allergen Reactions    Amiodarone Hives     Throat and tongue itching    Bactrim Shortness of Breath    Cipro Xr Swelling    Metoprolol Swelling     Causes throat swelling    Morphine Unspecified     Hallucinations    Phytoplex Z-Guard [Petrolatum-Zinc Oxide] Unspecified     \"causes burning\"    Pseudoephedrine Palpitations    Qvar [Beclomethasone Dipropionate] Unspecified     Pressure on heart      Vibramycin Shortness of Breath    Atorvastatin Calcium-Polysorbate 80 Unspecified     Muscle aches      Augmentin Unspecified     Unknown reaction    Diltiazem Rash     rash    Flecainide Unspecified     dizziness    Keflex Unspecified     Pt states \"Unsure\".    Mucinex Unspecified     GI Distress      Tramadol Unspecified     crying    Atorvastatin Myalgia    Tape Rash     Paper tape okay       FAMILY HISTORY  No pertinent family history    PHYSICAL EXAM   BP (!) 158/69   Pulse 84   Temp 36.6 °C (97.9 °F) (Temporal)   Resp 16   Ht 1.6 m (5' 3\")   Wt 90.7 kg (200 lb)   LMP 01/01/1993   SpO2 95%   BMI 35.43 kg/m²  @HARLAN[[id number]::@   Pulse ox interpretation: I interpret this pulse ox as normal.  VITALS - vital signs " documented prior to this note have been reviewed and noted,  GENERAL - awake, alert, oriented, GCS 15, no apparent distress, non-toxic  appearing  HEENT - normocephalic, atraumatic, pupils equal, sclera anicteric, mucus  membranes moist  NECK - supple, no meningismus, full active range of motion, trachea midline  CARDIOVASCULAR - regular rate/rhythm, no murmurs/gallops/rubs  PULMONARY - no respiratory distress, speaking in full sentences, clear to  auscultation bilaterally, no wheezing/ronchi/rales, no accessory muscle use  GASTROINTESTINAL - soft, non-tender, non-distended, no rebound, guarding,  or peritonitis  Pelvis: She has tenderness with palpation of her SI joint no midline thoracic or lumbar tenderness no obvious step-offs sensation is grossly intact in her lower extremities lower extremity strength is 4 out of 5 bilaterally she has 1+ DP pulses bilaterally  NEUROLOGIC - Awake alert, normal mental status, speech fluid, cognition  normal, moves all extremities  MUSCULOSKELETAL - no obvious asymmetry or deformities present  EXTREMITIES - warm, well-perfused, no cyanosis or significant edema  DERMATOLOGIC - warm, dry, no rashes, no jaundice  PSYCHIATRIC - normal affect, normal insight, normal concentration            LABS      Labs Reviewed - No data to display      Pertinent Labs & Imaging studies reviewed. (See chart for details)    RADIOLOGY  No orders to display             ED COURSE/PROCEDURES          Medications - No data to display            MEDICAL DECISION MAKING    Patient presented for evaluation of back pain.  Back pain has since resolved.  Does have tenderness with palpation of her SI joints, mild sacroiliitis may represent the patient's pain.  Currently she has no back pain red flags, no current ongoing pain.  Overall my clinical suspicion for underlying aortic dissection aortic abdominal aneurysm cauda equina space-occupying lesion transverse myelitis acute fracture is low.  Given that she has  no pain, and an otherwise reassuring exam do believe she is appropriate for transfer back to her skilled rehab facility.  Patient is discharged in a stable condition.    FINAL IMPRESSION  1.  Acute on chronic back pain  2.  Elevated blood pressure sacroiliitis           Electronically signed by: Jonathan George D.O., 11/24/2022 3:37 AM      Dictation Disclaimer  Please note this report has been produced using speech recognition software and  may contain errors related to that system, including errors seen in grammar,  punctuation and spelling, as well as words and phrases that may be inappropriate.  If there are any questions or concerns, please feel free to contact the dictating  physician for clarification.

## 2022-11-24 NOTE — DISCHARGE PLANNING
Mbr discharged before TCN could meet with mbr. Will defer to CM. Kindly refer to  Minnie Mart on 11/24/2022  4:26 AM. Thank You

## 2022-12-12 ENCOUNTER — PATIENT MESSAGE (OUTPATIENT)
Dept: MEDICAL GROUP | Facility: PHYSICIAN GROUP | Age: 82
End: 2022-12-12
Payer: MEDICARE

## 2022-12-12 DIAGNOSIS — R32 URINARY INCONTINENCE, UNSPECIFIED TYPE: ICD-10-CM

## 2022-12-12 DIAGNOSIS — I48.19 PERSISTENT ATRIAL FIBRILLATION (HCC): ICD-10-CM

## 2022-12-12 DIAGNOSIS — J30.1 SEASONAL ALLERGIC RHINITIS DUE TO POLLEN: ICD-10-CM

## 2022-12-12 DIAGNOSIS — I10 ESSENTIAL HYPERTENSION: ICD-10-CM

## 2022-12-12 DIAGNOSIS — Z79.01 CHRONIC ANTICOAGULATION: ICD-10-CM

## 2022-12-12 RX ORDER — MIRABEGRON 50 MG/1
50 TABLET, FILM COATED, EXTENDED RELEASE ORAL DAILY
Qty: 90 TABLET | Refills: 3 | OUTPATIENT
Start: 2022-12-12

## 2022-12-12 RX ORDER — LISINOPRIL 5 MG/1
5 TABLET ORAL
Qty: 100 TABLET | Refills: 0 | Status: CANCELLED
Start: 2022-12-12

## 2022-12-13 ENCOUNTER — TELEPHONE (OUTPATIENT)
Dept: CARDIOLOGY | Facility: MEDICAL CENTER | Age: 82
End: 2022-12-13

## 2022-12-13 DIAGNOSIS — I10 ESSENTIAL HYPERTENSION: ICD-10-CM

## 2022-12-13 NOTE — TELEPHONE ENCOUNTER
ANGEL    Caller: Elena    Medication Name and Dosage:    lisinopril (PRINIVIL) 5 MG Tab [[id number]]    Medication amount left: 0    Preferred Pharmacy:   [facility]  Fax - [phone]     Other questions (Topic):  N/A    Callback Number (Will only call for issues):  [phone]    Thank you,   Toshia OCHOA

## 2022-12-13 NOTE — TELEPHONE ENCOUNTER
Is the patient due for a refill? YES    Was the patient seen the past year? No    Date of last office visit: 11/10/2021    Does the patient have an upcoming appointment?  No    Provider to refill: RO     Does the patients insurance require a 100 day supply?  Yes      Return in about 6 months (around 5/10/2022).

## 2022-12-13 NOTE — TELEPHONE ENCOUNTER
Received request via: Patient    Was the patient seen in the last year in this department? Yes    Does the patient have an active prescription (recently filled or refills available) for medication(s) requested? No    Does the patient have assisted Plus and need 100 day supply (blood pressure, diabetes and cholesterol meds only)? Medication is not for cholesterol, blood pressure or diabetes

## 2022-12-14 RX ORDER — WARFARIN SODIUM 2.5 MG/1
TABLET ORAL
Qty: 135 TABLET | Refills: 1 | Status: ON HOLD | OUTPATIENT
Start: 2022-12-14 | End: 2022-12-30

## 2022-12-14 RX ORDER — MAGNESIUM OXIDE 400 MG/1
400 TABLET ORAL
Qty: 90 TABLET | Refills: 3 | Status: SHIPPED | OUTPATIENT
Start: 2022-12-14 | End: 2024-02-12 | Stop reason: SDUPTHER

## 2022-12-14 RX ORDER — MIRABEGRON 50 MG/1
50 TABLET, FILM COATED, EXTENDED RELEASE ORAL DAILY
Qty: 90 TABLET | Refills: 3 | Status: SHIPPED | OUTPATIENT
Start: 2022-12-14 | End: 2023-12-19 | Stop reason: SDUPTHER

## 2022-12-14 RX ORDER — IPRATROPIUM BROMIDE 21 UG/1
2 SPRAY, METERED NASAL EVERY 12 HOURS
Qty: 30 ML | Refills: 2 | Status: SHIPPED | OUTPATIENT
Start: 2022-12-14 | End: 2023-09-21

## 2022-12-14 RX ORDER — ATENOLOL 50 MG/1
50 TABLET ORAL 2 TIMES DAILY
Qty: 200 TABLET | Refills: 3 | Status: ON HOLD | OUTPATIENT
Start: 2022-12-14 | End: 2022-12-29

## 2022-12-14 NOTE — TELEPHONE ENCOUNTER
Requested Prescriptions     Signed Prescriptions Disp Refills    ipratropium (ATROVENT) 0.03 % Solution 30 mL 2     Sig: Administer 2 Sprays into affected nostril(S) every 12 hours.     Authorizing Provider: NORBERT PARADA     Refused Prescriptions Disp Refills    Mirabegron ER (MYRBETRIQ) 50 MG TABLET SR 24 HR 90 Tablet 3     Sig: Take 50 mg by mouth every day.     Refused By: SUSI HENRY     Reason for Refusal: Request already responded to by other means (e.g. phone or fax)     TIMUR Montes.

## 2022-12-14 NOTE — PROGRESS NOTES
Requested Prescriptions     Signed Prescriptions Disp Refills    magnesium oxide (MAG-OX) 400 MG Tab tablet 90 Tablet 3     Sig: Take 1 Tablet by mouth every day.     Authorizing Provider: NORBERT PARADA    Mirabegron ER (MYRBETRIQ) 50 MG TABLET SR 24 HR 90 Tablet 3     Sig: Take 50 mg by mouth every day.     Authorizing Provider: NORBERT PARADA    warfarin (COUMADIN) 2.5 MG Tab 135 Tablet 1     Sig: TAKE ONE TO ONE AND ONE-HALF TABLETS BY MOUTH EVERY DAY AS DIRECTED BY THE [facility]. Strength: 2.5 mg     Authorizing Provider: NORBERT PARADA    atenolol (TENORMIN) 50 MG Tab 200 Tablet 3     Sig: Take 1 Tablet by mouth 2 times a day.     Authorizing Provider: NORBERT PARADA A.P.R.N.

## 2022-12-15 RX ORDER — LISINOPRIL 5 MG/1
5 TABLET ORAL
Qty: 100 TABLET | Refills: 0 | Status: SHIPPED | OUTPATIENT
Start: 2022-12-15 | End: 2023-01-20 | Stop reason: SDUPTHER

## 2022-12-19 ENCOUNTER — TELEPHONE (OUTPATIENT)
Dept: HEALTH INFORMATION MANAGEMENT | Facility: OTHER | Age: 82
End: 2022-12-19
Payer: MEDICARE

## 2022-12-28 ENCOUNTER — HOSPITAL ENCOUNTER (INPATIENT)
Facility: MEDICAL CENTER | Age: 82
LOS: 2 days | DRG: 555 | End: 2022-12-30
Attending: EMERGENCY MEDICINE | Admitting: HOSPITALIST
Payer: MEDICARE

## 2022-12-28 ENCOUNTER — APPOINTMENT (OUTPATIENT)
Dept: RADIOLOGY | Facility: MEDICAL CENTER | Age: 82
DRG: 555 | End: 2022-12-28
Attending: EMERGENCY MEDICINE
Payer: MEDICARE

## 2022-12-28 DIAGNOSIS — R58 RETROPERITONEAL HEMORRHAGE: Primary | ICD-10-CM

## 2022-12-28 DIAGNOSIS — R79.1 SUBTHERAPEUTIC INTERNATIONAL NORMALIZED RATIO (INR): ICD-10-CM
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
## 2022-12-28 DIAGNOSIS — I10 ESSENTIAL HYPERTENSION: ICD-10-CM

## 2022-12-28 DIAGNOSIS — Z79.01 ANTICOAGULATED ON WARFARIN: ICD-10-CM

## 2022-12-28 DIAGNOSIS — R53.1 GENERALIZED WEAKNESS: ICD-10-CM

## 2022-12-28 DIAGNOSIS — R26.89 DECREASED MOBILITY: ICD-10-CM

## 2022-12-28 DIAGNOSIS — R10.9 FLANK PAIN: ICD-10-CM

## 2022-12-28 PROBLEM — S30.1XXD: Status: ACTIVE | Noted: 2022-12-28

## 2022-12-28 PROBLEM — S30.1XXA PSOAS HEMATOMA, LEFT, SECONDARY TO ANTICOAGULANT THERAPY: Status: ACTIVE | Noted: 2022-12-28

## 2022-12-28 PROBLEM — E87.6 HYPOKALEMIA: Status: ACTIVE | Noted: 2022-12-28

## 2022-12-28 LAB
ALBUMIN SERPL BCP-MCNC: 3.3 G/DL (ref 3.2–4.9)
ALBUMIN/GLOB SERPL: 1.1 G/DL
ALP SERPL-CCNC: 75 U/L (ref 30–99)
ALT SERPL-CCNC: 15 U/L (ref 2–50)
ANION GAP SERPL CALC-SCNC: 13 MMOL/L (ref 7–16)
APPEARANCE UR: CLEAR
APTT PPP: 28.3 SEC (ref 24.7–36)
AST SERPL-CCNC: 29 U/L (ref 12–45)
BASOPHILS # BLD AUTO: 0.4 % (ref 0–1.8)
BASOPHILS # BLD: 0.02 K/UL (ref 0–0.12)
BILIRUB SERPL-MCNC: 0.5 MG/DL (ref 0.1–1.5)
BILIRUB UR QL STRIP.AUTO: NEGATIVE
BUN SERPL-MCNC: 8 MG/DL (ref 8–22)
CALCIUM ALBUM COR SERPL-MCNC: 9 MG/DL (ref 8.5–10.5)
CALCIUM SERPL-MCNC: 8.4 MG/DL (ref 8.4–10.2)
CHLORIDE SERPL-SCNC: 103 MMOL/L (ref 96–112)
CO2 SERPL-SCNC: 22 MMOL/L (ref 20–33)
COLOR UR: YELLOW
CREAT SERPL-MCNC: 0.47 MG/DL (ref 0.5–1.4)
EKG IMPRESSION: NORMAL
EOSINOPHIL # BLD AUTO: 0.13 K/UL (ref 0–0.51)
EOSINOPHIL NFR BLD: 2.5 % (ref 0–6.9)
ERYTHROCYTE [DISTWIDTH] IN BLOOD BY AUTOMATED COUNT: 46.4 FL (ref 35.9–50)
ERYTHROCYTE [DISTWIDTH] IN BLOOD BY AUTOMATED COUNT: 47.1 FL (ref 35.9–50)
GFR SERPLBLD CREATININE-BSD FMLA CKD-EPI: 94 ML/MIN/1.73 M 2
GLOBULIN SER CALC-MCNC: 2.9 G/DL (ref 1.9–3.5)
GLUCOSE SERPL-MCNC: 118 MG/DL (ref 65–99)
GLUCOSE UR STRIP.AUTO-MCNC: NEGATIVE MG/DL
HCT VFR BLD AUTO: 46.4 % (ref 37–47)
HCT VFR BLD AUTO: 47.5 % (ref 37–47)
HGB BLD-MCNC: 14.9 G/DL (ref 12–16)
HGB BLD-MCNC: 15.5 G/DL (ref 12–16)
IMM GRANULOCYTES # BLD AUTO: 0.03 K/UL (ref 0–0.11)
IMM GRANULOCYTES NFR BLD AUTO: 0.6 % (ref 0–0.9)
INR PPP: 1.01 (ref 0.87–1.13)
KETONES UR STRIP.AUTO-MCNC: NEGATIVE MG/DL
LEUKOCYTE ESTERASE UR QL STRIP.AUTO: NEGATIVE
LYMPHOCYTES # BLD AUTO: 1.68 K/UL (ref 1–4.8)
LYMPHOCYTES NFR BLD: 32.4 % (ref 22–41)
MCH RBC QN AUTO: 30.9 PG (ref 27–33)
MCH RBC QN AUTO: 31.6 PG (ref 27–33)
MCHC RBC AUTO-ENTMCNC: 32.1 G/DL (ref 33.6–35)
MCHC RBC AUTO-ENTMCNC: 32.6 G/DL (ref 33.6–35)
MCV RBC AUTO: 96.3 FL (ref 81.4–97.8)
MCV RBC AUTO: 96.9 FL (ref 81.4–97.8)
MICRO URNS: NORMAL
MONOCYTES # BLD AUTO: 0.53 K/UL (ref 0–0.85)
MONOCYTES NFR BLD AUTO: 10.2 % (ref 0–13.4)
NEUTROPHILS # BLD AUTO: 2.8 K/UL (ref 2–7.15)
NEUTROPHILS NFR BLD: 53.9 % (ref 44–72)
NITRITE UR QL STRIP.AUTO: NEGATIVE
NRBC # BLD AUTO: 0 K/UL
NRBC BLD-RTO: 0 /100 WBC
PH UR STRIP.AUTO: 7 [PH] (ref 5–8)
PLATELET # BLD AUTO: 228 K/UL (ref 164–446)
PLATELET # BLD AUTO: 240 K/UL (ref 164–446)
PMV BLD AUTO: 10.1 FL (ref 9–12.9)
PMV BLD AUTO: 10.6 FL (ref 9–12.9)
POTASSIUM SERPL-SCNC: 3.5 MMOL/L (ref 3.6–5.5)
PROT SERPL-MCNC: 6.2 G/DL (ref 6–8.2)
PROT UR QL STRIP: NEGATIVE MG/DL
PROTHROMBIN TIME: 13.2 SEC (ref 12–14.6)
RBC # BLD AUTO: 4.82 M/UL (ref 4.2–5.4)
RBC # BLD AUTO: 4.9 M/UL (ref 4.2–5.4)
RBC UR QL AUTO: NEGATIVE
SODIUM SERPL-SCNC: 138 MMOL/L (ref 135–145)
SP GR UR STRIP.AUTO: 1.01
WBC # BLD AUTO: 5.2 K/UL (ref 4.8–10.8)
WBC # BLD AUTO: 6 K/UL (ref 4.8–10.8)

## 2022-12-28 PROCEDURE — 85730 THROMBOPLASTIN TIME PARTIAL: CPT

## 2022-12-28 PROCEDURE — 99223 1ST HOSP IP/OBS HIGH 75: CPT | Mod: AI | Performed by: HOSPITALIST

## 2022-12-28 PROCEDURE — 80053 COMPREHEN METABOLIC PANEL: CPT

## 2022-12-28 PROCEDURE — 81003 URINALYSIS AUTO W/O SCOPE: CPT

## 2022-12-28 PROCEDURE — 74176 CT ABD & PELVIS W/O CONTRAST: CPT

## 2022-12-28 PROCEDURE — 36415 COLL VENOUS BLD VENIPUNCTURE: CPT

## 2022-12-28 PROCEDURE — 93005 ELECTROCARDIOGRAM TRACING: CPT | Performed by: EMERGENCY MEDICINE

## 2022-12-28 PROCEDURE — 99285 EMERGENCY DEPT VISIT HI MDM: CPT

## 2022-12-28 PROCEDURE — 700102 HCHG RX REV CODE 250 W/ 637 OVERRIDE(OP): Performed by: EMERGENCY MEDICINE

## 2022-12-28 PROCEDURE — 85025 COMPLETE CBC W/AUTO DIFF WBC: CPT

## 2022-12-28 PROCEDURE — 85610 PROTHROMBIN TIME: CPT

## 2022-12-28 PROCEDURE — 770001 HCHG ROOM/CARE - MED/SURG/GYN PRIV*

## 2022-12-28 PROCEDURE — A9270 NON-COVERED ITEM OR SERVICE: HCPCS | Performed by: HOSPITALIST

## 2022-12-28 PROCEDURE — 85027 COMPLETE CBC AUTOMATED: CPT

## 2022-12-28 PROCEDURE — 700102 HCHG RX REV CODE 250 W/ 637 OVERRIDE(OP): Performed by: HOSPITALIST

## 2022-12-28 PROCEDURE — 700105 HCHG RX REV CODE 258: Performed by: HOSPITALIST

## 2022-12-28 PROCEDURE — A9270 NON-COVERED ITEM OR SERVICE: HCPCS | Performed by: EMERGENCY MEDICINE

## 2022-12-28 RX ORDER — SODIUM CHLORIDE 9 MG/ML
2000 INJECTION, SOLUTION INTRAVENOUS CONTINUOUS
Status: DISCONTINUED | OUTPATIENT
Start: 2022-12-28 | End: 2022-12-29

## 2022-12-28 RX ORDER — CLONIDINE HYDROCHLORIDE 0.1 MG/1
0.1 TABLET ORAL EVERY 8 HOURS PRN
Status: DISCONTINUED | OUTPATIENT
Start: 2022-12-28 | End: 2022-12-30 | Stop reason: HOSPADM

## 2022-12-28 RX ORDER — IPRATROPIUM BROMIDE 21 UG/1
2 SPRAY, METERED NASAL EVERY 12 HOURS
Status: DISCONTINUED | OUTPATIENT
Start: 2022-12-29 | End: 2022-12-30 | Stop reason: HOSPADM

## 2022-12-28 RX ORDER — ATENOLOL 25 MG/1
50 TABLET ORAL 2 TIMES DAILY
Status: DISCONTINUED | OUTPATIENT
Start: 2022-12-28 | End: 2022-12-30 | Stop reason: HOSPADM

## 2022-12-28 RX ORDER — BISACODYL 10 MG
10 SUPPOSITORY, RECTAL RECTAL
Status: DISCONTINUED | OUTPATIENT
Start: 2022-12-28 | End: 2022-12-30 | Stop reason: HOSPADM

## 2022-12-28 RX ORDER — LEVOTHYROXINE SODIUM 0.05 MG/1
50 TABLET ORAL
Status: DISCONTINUED | OUTPATIENT
Start: 2022-12-29 | End: 2022-12-30 | Stop reason: HOSPADM

## 2022-12-28 RX ORDER — POTASSIUM CHLORIDE 20 MEQ/1
40 TABLET, EXTENDED RELEASE ORAL ONCE
Status: COMPLETED | OUTPATIENT
Start: 2022-12-28 | End: 2022-12-28

## 2022-12-28 RX ORDER — POLYETHYLENE GLYCOL 3350 17 G/17G
1 POWDER, FOR SOLUTION ORAL
Status: DISCONTINUED | OUTPATIENT
Start: 2022-12-28 | End: 2022-12-30 | Stop reason: HOSPADM

## 2022-12-28 RX ORDER — AMOXICILLIN 250 MG
2 CAPSULE ORAL 2 TIMES DAILY
Status: DISCONTINUED | OUTPATIENT
Start: 2022-12-29 | End: 2022-12-30 | Stop reason: HOSPADM

## 2022-12-28 RX ORDER — LISINOPRIL 5 MG/1
5 TABLET ORAL DAILY
Status: DISCONTINUED | OUTPATIENT
Start: 2022-12-29 | End: 2022-12-29

## 2022-12-28 RX ORDER — METHOCARBAMOL 500 MG/1
750 TABLET, FILM COATED ORAL ONCE
Status: COMPLETED | OUTPATIENT
Start: 2022-12-28 | End: 2022-12-28

## 2022-12-28 RX ADMIN — POTASSIUM CHLORIDE 40 MEQ: 20 TABLET, EXTENDED RELEASE ORAL at 21:30

## 2022-12-28 RX ADMIN — SODIUM CHLORIDE 2000 ML: 9 INJECTION, SOLUTION INTRAVENOUS at 21:30

## 2022-12-28 RX ADMIN — ATENOLOL 50 MG: 25 TABLET ORAL at 21:15

## 2022-12-28 RX ADMIN — METHOCARBAMOL 750 MG: 500 TABLET ORAL at 17:45

## 2022-12-28 ASSESSMENT — COGNITIVE AND FUNCTIONAL STATUS - GENERAL
MOVING TO AND FROM BED TO CHAIR: A LITTLE
MOBILITY SCORE: 18
DRESSING REGULAR LOWER BODY CLOTHING: A LITTLE
TOILETING: A LITTLE
DAILY ACTIVITIY SCORE: 19
CLIMB 3 TO 5 STEPS WITH RAILING: A LITTLE
SUGGESTED CMS G CODE MODIFIER DAILY ACTIVITY: CK
SUGGESTED CMS G CODE MODIFIER MOBILITY: CK
MOVING FROM LYING ON BACK TO SITTING ON SIDE OF FLAT BED: A LITTLE
WALKING IN HOSPITAL ROOM: A LITTLE
DRESSING REGULAR UPPER BODY CLOTHING: A LITTLE
TURNING FROM BACK TO SIDE WHILE IN FLAT BAD: A LITTLE
HELP NEEDED FOR BATHING: A LITTLE
PERSONAL GROOMING: A LITTLE
STANDING UP FROM CHAIR USING ARMS: A LITTLE

## 2022-12-28 ASSESSMENT — ENCOUNTER SYMPTOMS
MYALGIAS: 0
CHILLS: 0
EYE DISCHARGE: 0
SHORTNESS OF BREATH: 0
STRIDOR: 0
BRUISES/BLEEDS EASILY: 0
EYE REDNESS: 0
FEVER: 0
ABDOMINAL PAIN: 0
VOMITING: 0
NERVOUS/ANXIOUS: 0
COUGH: 0
FOCAL WEAKNESS: 0
FLANK PAIN: 1

## 2022-12-28 ASSESSMENT — LIFESTYLE VARIABLES
ON A TYPICAL DAY WHEN YOU DRINK ALCOHOL HOW MANY DRINKS DO YOU HAVE: 0
EVER HAD A DRINK FIRST THING IN THE MORNING TO STEADY YOUR NERVES TO GET RID OF A HANGOVER: NO
HAVE PEOPLE ANNOYED YOU BY CRITICIZING YOUR DRINKING: NO
CONSUMPTION TOTAL: NEGATIVE
TOTAL SCORE: 0
HAVE YOU EVER FELT YOU SHOULD CUT DOWN ON YOUR DRINKING: NO
ALCOHOL_USE: NO
EVER FELT BAD OR GUILTY ABOUT YOUR DRINKING: NO
AVERAGE NUMBER OF DAYS PER WEEK YOU HAVE A DRINK CONTAINING ALCOHOL: 0
HOW MANY TIMES IN THE PAST YEAR HAVE YOU HAD 5 OR MORE DRINKS IN A DAY: 0

## 2022-12-28 ASSESSMENT — PATIENT HEALTH QUESTIONNAIRE - PHQ9
2. FEELING DOWN, DEPRESSED, IRRITABLE, OR HOPELESS: NOT AT ALL
1. LITTLE INTEREST OR PLEASURE IN DOING THINGS: NOT AT ALL
SUM OF ALL RESPONSES TO PHQ9 QUESTIONS 1 AND 2: 0

## 2022-12-28 ASSESSMENT — PAIN DESCRIPTION - PAIN TYPE: TYPE: ACUTE PAIN

## 2022-12-28 ASSESSMENT — FIBROSIS 4 INDEX
FIB4 SCORE: 2.62
FIB4 SCORE: 2.07

## 2022-12-28 NOTE — ED TRIAGE NOTES
"Chief Complaint   Patient presents with    Flank Pain     Pt woke up with left lower back/flank pain, states \"urine is darker than normal lately,\" denies any falls or injuries at [facility] care facility.  Pt given 50mcg fentanyl IV and 4mg zofran IV en route via EMS.  Pt at baseline of A+Ox2.   Takes warfarin for pacemaker. VSS upon arrival.      BP (!) 170/94   Pulse 82   Temp 36.1 °C (97 °F) (Temporal)   Resp 18   Ht 1.702 m (5' 7\")   Wt 90.7 kg (200 lb)   LMP 01/01/1993   SpO2 97%   BMI 31.32 kg/m²     "

## 2022-12-29 PROBLEM — R53.1 GENERALIZED WEAKNESS: Status: ACTIVE | Noted: 2022-12-29

## 2022-12-29 LAB
ALBUMIN SERPL BCP-MCNC: 3.5 G/DL (ref 3.2–4.9)
ALBUMIN/GLOB SERPL: 1.2 G/DL
ALP SERPL-CCNC: 75 U/L (ref 30–99)
ALT SERPL-CCNC: 14 U/L (ref 2–50)
ANION GAP SERPL CALC-SCNC: 10 MMOL/L (ref 7–16)
AST SERPL-CCNC: 27 U/L (ref 12–45)
BILIRUB SERPL-MCNC: 0.7 MG/DL (ref 0.1–1.5)
BUN SERPL-MCNC: 7 MG/DL (ref 8–22)
CALCIUM ALBUM COR SERPL-MCNC: 9.4 MG/DL (ref 8.5–10.5)
CALCIUM SERPL-MCNC: 9 MG/DL (ref 8.4–10.2)
CHLORIDE SERPL-SCNC: 105 MMOL/L (ref 96–112)
CO2 SERPL-SCNC: 23 MMOL/L (ref 20–33)
CREAT SERPL-MCNC: 0.54 MG/DL (ref 0.5–1.4)
ERYTHROCYTE [DISTWIDTH] IN BLOOD BY AUTOMATED COUNT: 46.2 FL (ref 35.9–50)
FLUAV RNA SPEC QL NAA+PROBE: NEGATIVE
FLUBV RNA SPEC QL NAA+PROBE: NEGATIVE
GFR SERPLBLD CREATININE-BSD FMLA CKD-EPI: 91 ML/MIN/1.73 M 2
GLOBULIN SER CALC-MCNC: 2.9 G/DL (ref 1.9–3.5)
GLUCOSE SERPL-MCNC: 131 MG/DL (ref 65–99)
HCT VFR BLD AUTO: 46.4 % (ref 37–47)
HCT VFR BLD AUTO: 47.2 % (ref 37–47)
HGB BLD-MCNC: 15.1 G/DL (ref 12–16)
HGB BLD-MCNC: 15.2 G/DL (ref 12–16)
MAGNESIUM SERPL-MCNC: 1.8 MG/DL (ref 1.5–2.5)
MCH RBC QN AUTO: 31.3 PG (ref 27–33)
MCHC RBC AUTO-ENTMCNC: 32.8 G/DL (ref 33.6–35)
MCV RBC AUTO: 95.5 FL (ref 81.4–97.8)
PLATELET # BLD AUTO: 212 K/UL (ref 164–446)
PMV BLD AUTO: 10.1 FL (ref 9–12.9)
POTASSIUM SERPL-SCNC: 4.7 MMOL/L (ref 3.6–5.5)
PROT SERPL-MCNC: 6.4 G/DL (ref 6–8.2)
RBC # BLD AUTO: 4.86 M/UL (ref 4.2–5.4)
RSV RNA SPEC QL NAA+PROBE: NEGATIVE
SARS-COV-2 RNA RESP QL NAA+PROBE: DETECTED
SODIUM SERPL-SCNC: 138 MMOL/L (ref 135–145)
SPECIMEN SOURCE: ABNORMAL
WBC # BLD AUTO: 6.5 K/UL (ref 4.8–10.8)

## 2022-12-29 PROCEDURE — 85027 COMPLETE CBC AUTOMATED: CPT

## 2022-12-29 PROCEDURE — A9270 NON-COVERED ITEM OR SERVICE: HCPCS | Performed by: HOSPITALIST

## 2022-12-29 PROCEDURE — 770001 HCHG ROOM/CARE - MED/SURG/GYN PRIV*

## 2022-12-29 PROCEDURE — 99232 SBSQ HOSP IP/OBS MODERATE 35: CPT | Performed by: INTERNAL MEDICINE

## 2022-12-29 PROCEDURE — 700105 HCHG RX REV CODE 258: Performed by: HOSPITALIST

## 2022-12-29 PROCEDURE — C9803 HOPD COVID-19 SPEC COLLECT: HCPCS | Performed by: INTERNAL MEDICINE

## 2022-12-29 PROCEDURE — 700102 HCHG RX REV CODE 250 W/ 637 OVERRIDE(OP): Performed by: HOSPITALIST

## 2022-12-29 PROCEDURE — 85018 HEMOGLOBIN: CPT

## 2022-12-29 PROCEDURE — 700111 HCHG RX REV CODE 636 W/ 250 OVERRIDE (IP): Performed by: INTERNAL MEDICINE

## 2022-12-29 PROCEDURE — 80053 COMPREHEN METABOLIC PANEL: CPT

## 2022-12-29 PROCEDURE — 97162 PT EVAL MOD COMPLEX 30 MIN: CPT

## 2022-12-29 PROCEDURE — A9270 NON-COVERED ITEM OR SERVICE: HCPCS | Performed by: INTERNAL MEDICINE

## 2022-12-29 PROCEDURE — 83735 ASSAY OF MAGNESIUM: CPT

## 2022-12-29 PROCEDURE — 8E0ZXY6 ISOLATION: ICD-10-PCS | Performed by: INTERNAL MEDICINE

## 2022-12-29 PROCEDURE — 94760 N-INVAS EAR/PLS OXIMETRY 1: CPT

## 2022-12-29 PROCEDURE — 85014 HEMATOCRIT: CPT

## 2022-12-29 PROCEDURE — 0241U HCHG SARS-COV-2 COVID-19 NFCT DS RESP RNA 4 TRGT MIC: CPT

## 2022-12-29 PROCEDURE — 36415 COLL VENOUS BLD VENIPUNCTURE: CPT

## 2022-12-29 PROCEDURE — 700102 HCHG RX REV CODE 250 W/ 637 OVERRIDE(OP): Performed by: INTERNAL MEDICINE

## 2022-12-29 RX ORDER — SOLIFENACIN SUCCINATE 5 MG/1
5 TABLET, FILM COATED ORAL DAILY
COMMUNITY
End: 2023-04-10

## 2022-12-29 RX ORDER — LISINOPRIL 5 MG/1
5 TABLET ORAL DAILY
Status: DISCONTINUED | OUTPATIENT
Start: 2022-12-29 | End: 2022-12-30 | Stop reason: HOSPADM

## 2022-12-29 RX ORDER — ATENOLOL 50 MG/1
50 TABLET ORAL DAILY
COMMUNITY
End: 2023-01-20 | Stop reason: SDUPTHER

## 2022-12-29 RX ORDER — ACETAMINOPHEN 500 MG
1000 TABLET ORAL 3 TIMES DAILY
Status: DISCONTINUED | OUTPATIENT
Start: 2022-12-29 | End: 2022-12-30 | Stop reason: HOSPADM

## 2022-12-29 RX ORDER — MAGNESIUM SULFATE 1 G/100ML
1 INJECTION INTRAVENOUS ONCE
Status: COMPLETED | OUTPATIENT
Start: 2022-12-29 | End: 2022-12-29

## 2022-12-29 RX ADMIN — LISINOPRIL 5 MG: 5 TABLET ORAL at 12:19

## 2022-12-29 RX ADMIN — MAGNESIUM SULFATE 1 G: 1 INJECTION INTRAVENOUS at 07:22

## 2022-12-29 RX ADMIN — ACETAMINOPHEN 1000 MG: 500 TABLET ORAL at 20:43

## 2022-12-29 RX ADMIN — SODIUM CHLORIDE 2000 ML: 9 INJECTION, SOLUTION INTRAVENOUS at 05:47

## 2022-12-29 RX ADMIN — LEVOTHYROXINE SODIUM 50 MCG: 50 TABLET ORAL at 05:46

## 2022-12-29 RX ADMIN — SERTRALINE HYDROCHLORIDE 50 MG: 50 TABLET ORAL at 05:46

## 2022-12-29 RX ADMIN — ACETAMINOPHEN 1000 MG: 500 TABLET ORAL at 12:19

## 2022-12-29 ASSESSMENT — ENCOUNTER SYMPTOMS
HEADACHES: 0
SHORTNESS OF BREATH: 0
NERVOUS/ANXIOUS: 0
FALLS: 0
FEVER: 0
CHILLS: 0
VOMITING: 0
COUGH: 0
DIZZINESS: 0
BACK PAIN: 0
PALPITATIONS: 0
DOUBLE VISION: 0
HEARTBURN: 0
ABDOMINAL PAIN: 0
BLURRED VISION: 0

## 2022-12-29 ASSESSMENT — PATIENT HEALTH QUESTIONNAIRE - PHQ9
1. LITTLE INTEREST OR PLEASURE IN DOING THINGS: NOT AT ALL
SUM OF ALL RESPONSES TO PHQ9 QUESTIONS 1 AND 2: 0
2. FEELING DOWN, DEPRESSED, IRRITABLE, OR HOPELESS: NOT AT ALL
1. LITTLE INTEREST OR PLEASURE IN DOING THINGS: NOT AT ALL
SUM OF ALL RESPONSES TO PHQ9 QUESTIONS 1 AND 2: 0
2. FEELING DOWN, DEPRESSED, IRRITABLE, OR HOPELESS: NOT AT ALL

## 2022-12-29 ASSESSMENT — COGNITIVE AND FUNCTIONAL STATUS - GENERAL
CLIMB 3 TO 5 STEPS WITH RAILING: TOTAL
MOVING FROM LYING ON BACK TO SITTING ON SIDE OF FLAT BED: A LITTLE
STANDING UP FROM CHAIR USING ARMS: A LITTLE
MOBILITY SCORE: 16
WALKING IN HOSPITAL ROOM: TOTAL
SUGGESTED CMS G CODE MODIFIER MOBILITY: CK

## 2022-12-29 ASSESSMENT — LIFESTYLE VARIABLES: SUBSTANCE_ABUSE: 0

## 2022-12-29 ASSESSMENT — PAIN DESCRIPTION - PAIN TYPE
TYPE: ACUTE PAIN
TYPE: ACUTE PAIN

## 2022-12-29 ASSESSMENT — GAIT ASSESSMENTS: GAIT LEVEL OF ASSIST: UNABLE TO PARTICIPATE

## 2022-12-29 NOTE — ASSESSMENT & PLAN NOTE
Resume home atenolol with hold parameters  We will hold home warfarin for now given her hematoma, consider restarting according to clinical course

## 2022-12-29 NOTE — DISCHARGE PLANNING
Case Management Discharge Planning    Admission Date: 12/28/2022  GMLOS: 3.6  ALOS: 1    6-Clicks ADL Score: 19  6-Clicks Mobility Score: 18      Anticipated Discharge Dispo:      DME Needed: No    Action(s) Taken: Updated Provider/Nurse on Discharge Plan    1309: RN CM  spoke with Sidney who states patient lives at  [facility] on Assisted Living side.  Pending PT/OT evals.     Escalations Completed: PT    Medically Clear: No    Next Steps: Medical clearance, PT/OT evals    Barriers to Discharge: Medical clearance and Pending PT Evaluation

## 2022-12-29 NOTE — PROGRESS NOTES
"Hospital Medicine Daily Progress Note    Date of Service  12/29/2022    Chief Complaint  Donte Magaña is a 82 y.o. female admitted 12/28/2022 with flank pain.    Hospital Course  As per chart review:  \"82 y.o. female with a past medical history of atrial fibrillation on anticoagulation with warfarin, primary hypertension and hypothyroidism who presented 12/28/2022 with left-sided flank pain.  Symptoms started on the morning of admission as severe pain.  Pain is rated 10/10.  Pain is worse with movement.  No relieving factors.  The pain does not radiate to other places.  Patient denies having falls or trauma to the region.  The patient denies noticing any bruising or bleeding from other sites.  Patient reports associated lightheadedness and dizziness.\"    Interval Problem Update  12/29: Patient seen at bedside this morning.  No family members present at bedside, however I was able to talk to the patient's daughter over the phone.  The patient at the time of my evaluation was somewhat confused saying that it was 1989.  This might be her baseline.  After talking to the patient's daughter it seems the patient does have some cognitive decline, however it does not seem that she has been diagnosed with dementia.  The daughter told me that she has the flu, we will order COVID and flu test.  It is unclear the etiology of the patient's hematoma.  We have consulted general surgery to see if the patient would warrant drainage of the hematoma.  Appreciate further recommendations.  It seems that hemoglobin seems to be stable.  We will continue to hold anticoagulation.  After discussing the risk and benefits of anticoagulation, for now the patient's daughter who makes decisions for the patient would like to stop anticoagulation for now until evaluated by PCP as an outpatient.  So pending PT/OT evaluation.  Continue to monitor closely.    I have discussed this patient's plan of care and discharge plan at IDT rounds today with " Case Management, Nursing, Nursing leadership, and other members of the IDT team.    Consultants/Specialty  general surgery    Code Status  Full Code    Disposition  Patient is not medically cleared for discharge.   Anticipate discharge to pending PT/OT evaluation.    Review of Systems  Review of Systems   Constitutional:  Negative for chills and fever.   HENT:  Negative for hearing loss and nosebleeds.    Eyes:  Negative for blurred vision and double vision.   Respiratory:  Negative for cough and shortness of breath.    Cardiovascular:  Negative for chest pain and palpitations.   Gastrointestinal:  Negative for abdominal pain, heartburn and vomiting.   Genitourinary:  Negative for dysuria and urgency.   Musculoskeletal:  Negative for back pain and falls.   Skin:  Negative for itching and rash.   Neurological:  Negative for dizziness and headaches.   Psychiatric/Behavioral:  Negative for substance abuse. The patient is not nervous/anxious.    All other systems reviewed and are negative.     Physical Exam  Temp:  [36 °C (96.8 °F)-36.8 °C (98.2 °F)] 36.8 °C (98.2 °F)  Pulse:  [80-90] 88  Resp:  [17-18] 18  BP: (106-190)/(53-94) 126/60  SpO2:  [91 %-97 %] 91 %    Physical Exam  Vitals and nursing note reviewed.   Constitutional:       General: She is not in acute distress.     Appearance: She is ill-appearing.   HENT:      Head: Normocephalic and atraumatic.      Right Ear: External ear normal.      Left Ear: External ear normal.      Mouth/Throat:      Pharynx: No oropharyngeal exudate or posterior oropharyngeal erythema.   Eyes:      General:         Right eye: No discharge.         Left eye: No discharge.   Cardiovascular:      Rate and Rhythm: Normal rate and regular rhythm.      Pulses: Normal pulses.      Heart sounds: No murmur heard.    No gallop.   Pulmonary:      Effort: Pulmonary effort is normal. No respiratory distress.      Breath sounds: Normal breath sounds. No wheezing or rhonchi.   Abdominal:       General: Bowel sounds are normal. There is no distension.      Palpations: Abdomen is soft.      Tenderness: There is abdominal tenderness. There is no guarding.   Musculoskeletal:         General: Tenderness present. No swelling. Normal range of motion.      Cervical back: Normal range of motion and neck supple. No tenderness.   Skin:     General: Skin is warm and dry.   Neurological:      Mental Status: She is alert. She is disoriented.      Motor: No weakness.   Psychiatric:         Mood and Affect: Mood normal.         Behavior: Behavior normal.       Fluids    Intake/Output Summary (Last 24 hours) at 12/29/2022 1204  Last data filed at 12/29/2022 0911  Gross per 24 hour   Intake 1120 ml   Output --   Net 1120 ml       Laboratory  Recent Labs     12/28/22  1540 12/28/22  2251 12/29/22  0452   WBC 5.2 6.0 6.5   RBC 4.90 4.82 4.86   HEMOGLOBIN 15.5 14.9 15.2   HEMATOCRIT 47.5* 46.4 46.4   MCV 96.9 96.3 95.5   MCH 31.6 30.9 31.3   MCHC 32.6* 32.1* 32.8*   RDW 47.1 46.4 46.2   PLATELETCT 240 228 212   MPV 10.6 10.1 10.1     Recent Labs     12/28/22  1540 12/29/22  0452   SODIUM 138 138   POTASSIUM 3.5* 4.7   CHLORIDE 103 105   CO2 22 23   GLUCOSE 118* 131*   BUN 8 7*   CREATININE 0.47* 0.54   CALCIUM 8.4 9.0     Recent Labs     12/28/22  1950   APTT 28.3   INR 1.01               Imaging  CT-RENAL COLIC EVALUATION(A/P W/O)   Final Result      1.  Asymmetric enlargement of the left psoas muscle at the level of the left kidney. The and measures 6 x 3.3 x 2.5 cm in size. This most likely represents intramuscular hematoma.      2.  No evidence of renal or ureteral stone or evidence of hydronephrosis.      3.  Diverticulosis.      4.  Bibasilar patchy groundglass opacities possibly representing inflammatory process.      5.  Extensive atherosclerotic vascular calcification.      6.  This was discussed with ALEXSANDRA HUGHES II at 7:38 PM on 12/28/2022.           Assessment/Plan  * Psoas hematoma, left, secondary to  anticoagulant therapy- (present on admission)  Assessment & Plan  Strangely her INR is sub-therapeutic  Presents with left flank pain  CT imaging is showing asymmetric enlargement of the left psoas muscle at the level of the left kidney. The and measures 6 x 3.3 x 2.5 cm in size   We will trend hemoglobin levels  We will hold anticoagulation for now, consider restarting according to clinical course and serial hemoglobin/pain trend    12/29: Hemoglobin seems to be stable. We have consulted surgery to see if patient would benefit from draining such hematoma. We appreciate further recommendations.    Generalized weakness  Assessment & Plan  PT/OT    Hypokalemia- (present on admission)  Assessment & Plan  Replacing, checking magnesium    Continue to monitor replace as needed     12/29: resolved    Stage 3a chronic kidney disease (HCC)- (present on admission)  Assessment & Plan  As per chart review, hx of it seems  Avoid/minimize nephrotoxins as much as possible, renally dose medications, monitor inputs and outputs     Essential hypertension- (present on admission)  Assessment & Plan  Resume home atenolol, lisinopril with hold parameters    Persistent atrial fibrillation (HCC)- (present on admission)  Assessment & Plan  Resume home atenolol with hold parameters  We will hold home warfarin for now given her hematoma, consider restarting according to clinical course    Hypothyroidism- (present on admission)  Assessment & Plan  Resume home levothyroxine         VTE prophylaxis: SCDs/TEDs    I have performed a physical exam and reviewed and updated ROS and Plan today (12/29/2022). In review of yesterday's note (12/28/2022), there are no changes except as documented above.

## 2022-12-29 NOTE — H&P
"Hospital Medicine History & Physical Note    Date of Service  12/28/2022    Primary Care Physician  VICTORIANO Montes    Consultants  None     Code Status  Full Code    Chief Complaint  Chief Complaint   Patient presents with    Flank Pain     Pt woke up with left lower back/flank pain, states \"urine is darker than normal lately,\" denies any falls or injuries at [facility].  Pt given 50mcg fentanyl IV and 4mg zofran IV en route via EMS.  Pt at baseline of A+Ox2.   Takes warfarin for pacemaker. VSS upon arrival.      History of Presenting Illness  Donte Magaña is a 82 y.o. female with a past medical history of atrial fibrillation on anticoagulation with warfarin, primary hypertension and hypothyroidism who presented 12/28/2022 with left-sided flank pain.  Symptoms started on the morning of admission as severe pain.  Pain is rated 10/10.  Pain is worse with movement.  No relieving factors.  The pain does not radiate to other places.  Patient denies having falls or trauma to the region.  The patient denies noticing any bruising or bleeding from other sites.  Patient reports associated lightheadedness and dizziness.     I discussed the plan of care with emergency department physician, and the patient.    Review of Systems  Review of Systems   Constitutional:  Negative for chills and fever.   Eyes:  Negative for discharge and redness.   Respiratory:  Negative for cough, shortness of breath and stridor.    Cardiovascular:  Negative for chest pain and leg swelling.   Gastrointestinal:  Negative for abdominal pain and vomiting.   Genitourinary:  Positive for flank pain.   Musculoskeletal:  Negative for myalgias.   Skin: Negative.    Neurological:  Negative for focal weakness.   Endo/Heme/Allergies:  Does not bruise/bleed easily.   Psychiatric/Behavioral:  The patient is not nervous/anxious.      Past Medical History   has a past medical history of A-fib (HCC), Anesthesia, Anticoagulant long-term " use (1/12/2012), Arthritis, Atrial fibrillation (HCC), Backpain, Bowel habit changes, Breath shortness, Bronchitis (Nov, 2013), CAD (coronary artery disease), Depression, Glaucoma (5/3/2011), Hematoma complicating a procedure (11/3/2012), Hemorrhagic disorder (HCC), Hypertension, Hypothyroid, Lupus (HCC), Macular degeneration, Menopause (1/12/2012), Mitral regurgitation (10/30/2012), Obesity (1/12/2012), Pacemaker (2018), Pneumonia (feb,2013), Pre-syncope (6/29/2018), Pulmonary hypertension (HCC) (10/30/2012), PVC's (premature ventricular contractions) (1/12/2012), Senile nuclear sclerosis, Spinal stenosis of lumbar region at multiple levels, Unspecified cataract, Unspecified urinary incontinence, and Urinary bladder disorder.    Surgical History   has a past surgical history that includes tonsillectomy and adenoidectomy; recovery (11/30/2010); colonoscopy (2008); abdominal hysterectomy total (April 15,1975); other; cataract phaco with iol (4/21/2015); cataract phaco with iol (Right, 5/5/2015); pacemaker insertion (06/30/2018); open reduction; other cardiac surgery (12/2017 and 07/19/2018 ); hip arth anterior total (Right, 1/17/2019); irrigation & debridement ortho (Right, 2/14/2019); pr combined ant/post colporrhaphy (1/14/2020); pr lap,diagnostic abdomen (1/14/2020); enterocele repair (1/14/2020); bladder sling female (1/14/2020); vaginal suspension (1/14/2020); salpingo oophorectomy (Bilateral, 1/14/2020); knee arthroplasty total (Left, 5/28/2015); and knee arthroplasty total (Right, 6/23/2016).     Family History  family history includes Cancer in her paternal aunt; Diabetes in her father; GI Disease in her daughter; Heart Disease in her brother and daughter; Other in her daughter; Stroke in her mother, sister, and sister.      Social History   reports that she has never smoked. She has never used smokeless tobacco. She reports that she does not drink alcohol and does not use drugs.    Allergies  Allergies  "  Allergen Reactions    Amiodarone Hives     Throat and tongue itching    Bactrim Shortness of Breath    Cipro Xr Swelling    Metoprolol Swelling     Causes throat swelling    Morphine Unspecified     Hallucinations    Phytoplex Z-Guard [Petrolatum-Zinc Oxide] Unspecified     \"causes burning\"    Pseudoephedrine Palpitations    Qvar [Beclomethasone Dipropionate] Unspecified     Pressure on heart      Vibramycin Shortness of Breath    Atorvastatin Calcium-Polysorbate 80 Unspecified     Muscle aches      Augmentin Unspecified     Unknown reaction    Diltiazem Rash     rash    Flecainide Unspecified     dizziness    Keflex Unspecified     Pt states \"Unsure\".    Mucinex Unspecified     GI Distress      Tramadol Unspecified     crying    Atorvastatin Myalgia    Tape Rash     Paper tape okay     Medications  Prior to Admission Medications   Prescriptions Last Dose Informant Patient Reported? Taking?   Acetaminophen 500 MG Cap  Patient Yes No   Sig: Take 2 Caps by mouth 3 times a day as needed. Indications: Pain   Lutein 20 MG Cap  Patient No No   Sig: Take 1 Cap by mouth every day.   Mirabegron ER (MYRBETRIQ) 50 MG TABLET SR 24 HR   No No   Sig: Take 50 mg by mouth every day.   Sennosides (SENOKOT PO)   Yes No   Sig: Take  by mouth.   atenolol (TENORMIN) 50 MG Tab   No No   Sig: Take 1 Tablet by mouth 2 times a day.   diphenhydrAMINE HCl (BENADRYL ALLERGY PO)   Yes No   Sig: Take  by mouth.   furosemide (LASIX) 40 MG Tab   No No   Sig: Take 1 Tablet by mouth every day.   ipratropium (ATROVENT) 0.03 % Solution   No No   Sig: Administer 2 Sprays into affected nostril(S) every 12 hours.   levothyroxine (SYNTHROID) 50 MCG Tab   No No   Sig: Take 1 Tablet by mouth every morning on an empty stomach.   lisinopril (PRINIVIL) 5 MG Tab   No No   Sig: Take 1 Tablet by mouth every day.   magnesium oxide (MAG-OX) 400 MG Tab tablet   No No   Sig: Take 1 Tablet by mouth every day.   sertraline (ZOLOFT) 50 MG Tab   No No   Sig: TAKE ONE " TABLET BY MOUTH EVERY DAY   vitamin D (CHOLECALCIFEROL) 1000 UNIT TABS  Patient Yes No   Sig: Take 2,000 Units by mouth every day.   warfarin (COUMADIN) 2.5 MG Tab   No No   Sig: TAKE ONE TO ONE AND ONE-HALF TABLETS BY MOUTH EVERY DAY AS DIRECTED BY THE [facility]. Strength: 2.5 mg      Facility-Administered Medications: None     Physical Exam  Temp:  [36 °C (96.8 °F)-36.1 °C (97 °F)] 36 °C (96.8 °F)  Pulse:  [80-90] 80  Resp:  [17-18] 17  BP: (165-190)/(83-94) 190/83  SpO2:  [97 %] 97 %  Blood Pressure : (!) 190/83   Temperature: 36 °C (96.8 °F)   Pulse: 80   Respiration: 17   Pulse Oximetry: 97 %     Physical Exam  Constitutional:       General: She is not in acute distress.  HENT:      Head: Normocephalic and atraumatic.      Right Ear: External ear normal.      Left Ear: External ear normal.      Nose: No congestion or rhinorrhea.      Mouth/Throat:      Mouth: Mucous membranes are dry.      Pharynx: No oropharyngeal exudate or posterior oropharyngeal erythema.   Eyes:      General: No scleral icterus.        Right eye: No discharge.         Left eye: No discharge.      Conjunctiva/sclera: Conjunctivae normal.      Pupils: Pupils are equal, round, and reactive to light.   Cardiovascular:      Rate and Rhythm: Normal rate. Rhythm irregular.      Heart sounds:     No friction rub. No gallop.   Pulmonary:      Effort: Pulmonary effort is normal.   Abdominal:      General: Abdomen is flat. There is no distension.      Tenderness: There is abdominal tenderness. There is no guarding.   Musculoskeletal:         General: Tenderness present. No swelling.      Cervical back: Neck supple. No rigidity. No muscular tenderness.      Right lower leg: No edema.      Left lower leg: No edema.      Comments: Reduced range of motion of the left hip secondary to pain   Skin:     General: Skin is dry.      Capillary Refill: Capillary refill takes 2 to 3 seconds.      Coloration: Skin is not jaundiced or pale.       Findings: No bruising or erythema.   Neurological:      Mental Status: She is alert and oriented to person, place, and time.   Psychiatric:         Mood and Affect: Mood normal.         Judgment: Judgment normal.     Laboratory:  Recent Labs     12/28/22  1540   WBC 5.2   RBC 4.90   HEMOGLOBIN 15.5   HEMATOCRIT 47.5*   MCV 96.9   MCH 31.6   MCHC 32.6*   RDW 47.1   PLATELETCT 240   MPV 10.6     Recent Labs     12/28/22  1540   SODIUM 138   POTASSIUM 3.5*   CHLORIDE 103   CO2 22   GLUCOSE 118*   BUN 8   CREATININE 0.47*   CALCIUM 8.4     Recent Labs     12/28/22  1540   ALTSGPT 15   ASTSGOT 29   ALKPHOSPHAT 75   TBILIRUBIN 0.5   GLUCOSE 118*     Recent Labs     12/28/22  1950   APTT 28.3   INR 1.01     No results for input(s): NTPROBNP in the last 72 hours.      No results for input(s): TROPONINT in the last 72 hours.    Imaging:  CT-RENAL COLIC EVALUATION(A/P W/O)   Final Result      1.  Asymmetric enlargement of the left psoas muscle at the level of the left kidney. The and measures 6 x 3.3 x 2.5 cm in size. This most likely represents intramuscular hematoma.      2.  No evidence of renal or ureteral stone or evidence of hydronephrosis.      3.  Diverticulosis.      4.  Bibasilar patchy groundglass opacities possibly representing inflammatory process.      5.  Extensive atherosclerotic vascular calcification.      6.  This was discussed with ALEXSANDRA HUGHES II at 7:38 PM on 12/28/2022.        Assessment/Plan:  Justification for Admission Status  I anticipate this patient will require at least two midnights for appropriate medical management, necessitating inpatient admission because patient has cellulitis hematoma with severe pain, will require holding anticoagulation, monitoring hemoglobin for significant drops, serial clinical examinations , pain control, restarting anticoagulation when hemoglobin and hematoma is stable    Psoas hematoma, left, secondary to anticoagulant therapy- (present on  admission)  Assessment & Plan  Strangely her INR is sub-therapeutic  Presents with left flank pain  CT imaging is showing asymmetric enlargement of the left psoas muscle at the level of the left kidney. The and measures 6 x 3.3 x 2.5 cm in size   We will trend hemoglobin levels  We will hold anticoagulation for now, consider restarting according to clinical course and serial hemoglobin/pain trend    Hypokalemia- (present on admission)  Assessment & Plan  Replacing, checking magnesium    Continue to monitor replace as needed     Stage 3a chronic kidney disease (HCC)- (present on admission)  Assessment & Plan  Avoid/minimize nephrotoxins as much as possible, renally dose medications, monitor inputs and outputs     Essential hypertension- (present on admission)  Assessment & Plan  Resume home atenolol, lisinopril with hold parameters    Persistent atrial fibrillation (HCC)- (present on admission)  Assessment & Plan  Resume home atenolol with hold parameters  We will hold home warfarin for now given her hematoma, consider restarting according to clinical course    Hypothyroidism- (present on admission)  Assessment & Plan  Resume home levothyroxine    VTE prophylaxis: SCDs/TEDs, home warfarin on hold for now for psoas hematoma

## 2022-12-29 NOTE — ED NOTES
Pt repositioned up in bed and made comfortable. Given OJ and refusing dinner at this time. No other needs currently. Awaiting bed placement.

## 2022-12-29 NOTE — ASSESSMENT & PLAN NOTE
As per chart review, hx of it seems  Avoid/minimize nephrotoxins as much as possible, renally dose medications, monitor inputs and outputs

## 2022-12-29 NOTE — PROGRESS NOTES
4 Eyes Skin Assessment Completed by DANNY Raygoza and DANNY Rivas.    Head WDL  Ears WDL  Nose WDL  Mouth WDL  Neck WDL  Breast/Chest WDL  Shoulder Blades WDL  Spine WDL  (R) Arm/Elbow/Hand Dry and Flaky  (L) Arm/Elbow/Hand Dry and Flaky  Abdomen WDL  Groin WDL  Scrotum/Coccyx/Buttocks WDL  (R) Leg Dry and Flaky  (L) Leg Dry and Flaky  (R) Heel/Foot/Toe Edema, Swelling, Dry and Flaky  (L) Heel/Foot/Toe Edema, Swelling, Dry and Flaky          Devices In Places N/A      Interventions In Place Pillows and Pressure Redistribution Mattress    Possible Skin Injury No    Pictures Uploaded Into Epic N/A  Wound Consult Placed N/A  RN Wound Prevention Protocol Ordered No

## 2022-12-29 NOTE — ED NOTES
Unable to complete med rec. No MAR was sent from unknown [facility].

## 2022-12-29 NOTE — ASSESSMENT & PLAN NOTE
Strangely her INR is sub-therapeutic  Presents with left flank pain  CT imaging is showing asymmetric enlargement of the left psoas muscle at the level of the left kidney. The and measures 6 x 3.3 x 2.5 cm in size   We will trend hemoglobin levels  We will hold anticoagulation for now, consider restarting according to clinical course and serial hemoglobin/pain trend    12/29: Hemoglobin seems to be stable. We have consulted surgery to see if patient would benefit from draining such hematoma. We appreciate further recommendations.    12/30: I discussed findings with Dr Miller and he recommends just monitoring and not draining the hematoma. Hb is stable. Anticoagulation on hold for now. Patient will require close follow up as outpatient.

## 2022-12-29 NOTE — ED PROVIDER NOTES
"  ER Provider Note    Scribed for Sam Real Ii, M.d. by Annmarie Jacques. 12/28/2022  5:12 PM    Primary Care Provider: VICTORIANO Montes  Means of Arrival: EMS  History obtained from: Patient    CHIEF COMPLAINT  Chief Complaint   Patient presents with    Flank Pain     Pt woke up with left lower back/flank pain, states \"urine is darker than normal lately,\" denies any falls or injuries at [facility].  Pt given 50mcg fentanyl IV and 4mg zofran IV en route via EMS.  Pt at baseline of A+Ox2.   Takes warfarin for pacemaker. VSS upon arrival.      LIMITATION TO HISTORY   Select: : None    HPI  Donte Magaña is a 82 y.o. female with hypertension, atrial fibrillation, anticoagulated with warfarin who presents to the ED for left flank pain onset this morning. She woke up crying due to the pain. She denies history of kidney stones. She denies fever, abdominal pain, or vomiting. Upon arrival a the ED she began to feel dizzy. She is able to move her legs. She has not had back pain like this before. She was brought here from [facility] facility.      OUTSIDE HISTORIAN(S):  Select: None    EXTERNAL RECORDS REVIEWED  Select: Outpatient Notes Anticoaulated for atrial fibrillation.     REVIEW OF SYSTEMS  Pertinent positives include left sided flank pain and dizziness. Pertinent negatives include no fever, abdominal pain, or vomiting.  All other systems reviewed and negative.     PAST MEDICAL HISTORY  Past Medical History:   Diagnosis Date    A-fib (HCC)     Anesthesia     \"Tachycardia for 5 days after cataract surgery\"    Anticoagulant long-term use 1/12/2012    Arthritis     osteo-Knees, hips    Atrial fibrillation (HCC)     Backpain     R hip    Bowel habit changes     diarrhea    Breath shortness     with exertion, prn O2 2L    Bronchitis Nov, 2013    CAD (coronary artery disease)     Depression     Glaucoma 5/3/2011    Hematoma complicating a procedure 11/3/2012    Hemorrhagic " disorder (HCC)     bruising/coumadin    Hypertension     Hypothyroid     Lupus (HCC)     Macular degeneration     Menopause 1/12/2012    Mitral regurgitation 10/30/2012    Obesity 1/12/2012    Pacemaker 2018    Pneumonia feb,2013    Pre-syncope 6/29/2018    Pulmonary hypertension (HCC) 10/30/2012    PVC's (premature ventricular contractions) 1/12/2012    Senile nuclear sclerosis     Spinal stenosis of lumbar region at multiple levels     Unspecified cataract     repaired bilateral    Unspecified urinary incontinence     Urinary bladder disorder        SURGICAL HISTORY  Past Surgical History:   Procedure Laterality Date    MO COMBINED ANT/POST COLPORRHAPHY  1/14/2020    Procedure: COLPORRHAPHY, COMBINED ANTEROPOSTERIOR - PERINEOPLASTY;  Surgeon: Waqas Robin M.D.;  Location: SURGERY SAME DAY [facility];  Service: Gynecology    MO LAP,DIAGNOSTIC ABDOMEN  1/14/2020    Procedure: PELVISCOPY;  Surgeon: Waqas Robin M.D.;  Location: SURGERY SAME DAY [facility];  Service: Gynecology    ENTEROCELE REPAIR  1/14/2020    Procedure: REPAIR, ENTEROCELE;  Surgeon: Waqas Robin M.D.;  [facility];  Service: Gynecology    BLADDER SLING FEMALE  1/14/2020    Procedure: BLADDER SLING, FEMALE - TOT;  Surgeon: Waqas Robin M.D.;  [facility];  Service: Gynecology    VAGINAL SUSPENSION  1/14/2020    Procedure: COLPOPEXY - SACROSPINOUS VAULT SUSPENSION;  Surgeon: Waqas Robin M.D.;  [facility];  Service: Gynecology    SALPINGO OOPHORECTOMY Bilateral 1/14/2020    Procedure: SALPINGO-OOPHORECTOMY;  Surgeon: Waqas Robin M.D.;  [facility];  Service: Gynecology    IRRIGATION & DEBRIDEMENT ORTHO Right 2/14/2019    Procedure: IRRIGATION & DEBRIDEMENT ORTHO-HIP WOUND ;  Surgeon: Vladimir Lee M.D.;  Location: SURGERY [facility];  Service: Orthopedics    HIP ARTH ANTERIOR TOTAL Right 1/17/2019     Procedure: HIP ARTHROPLASTY ANTERIOR TOTAL;  Surgeon: Juan C Mercedes M.D.;  Location: SURGERY [facility];  Service: Orthopedics    PACEMAKER INSERTION  06/30/2018    Dual Chamber    KNEE ARTHROPLASTY TOTAL Right 6/23/2016    Procedure: KNEE ARTHROPLASTY TOTAL;  Surgeon: Heriberto Lozada M.D.;  Location: SURGERY [facility];  Service:     KNEE ARTHROPLASTY TOTAL Left 5/28/2015    Procedure: KNEE ARTHROPLASTY TOTAL;  Surgeon: Heriberto Lozada M.D.;  Location: SURGERY [facility];  Service:     CATARACT PHACO WITH IOL Right 5/5/2015    Procedure: IOL OD - STANDARD;  Surgeon: Dmitry Bejarano M.D.;  Location: SURGERY [facility];  Service:     CATARACT PHACO WITH IOL  4/21/2015    Performed by Dmitry Bejarano M.D. at [facility] ORS    RECOVERY  11/30/2010    Performed by SURGERY, CATH-RECOVERY at SURGERY [facility]    COLONOSCOPY  2008    Normal    [facility]    ABDOMINAL HYSTERECTOMY TOTAL  April 15,1975    still has ovaries    OPEN REDUCTION      left ankle    OTHER      Removed pins from left ankle    OTHER CARDIAC SURGERY  12/2017 and 07/19/2018     Cardiac Ablation    TONSILLECTOMY AND ADENOIDECTOMY         FAMILY HISTORY  Family History   Problem Relation Age of Onset    Stroke Mother     Diabetes Father     Stroke Sister     Heart Disease Brother     Stroke Sister     GI Disease Daughter         Crohn's Disease    Heart Disease Daughter         CHF    Other Daughter         Chronic Pain--Lymphedema    Cancer Paternal Aunt         breast       SOCIAL HISTORY   reports that she has never smoked. She has never used smokeless tobacco. She reports that she does not drink alcohol and does not use drugs. [facility]    CURRENT MEDICATIONS  Previous Medications    ACETAMINOPHEN 500 MG CAP    Take 2 Caps by mouth 3 times a day as needed. Indications: Pain    ATENOLOL (TENORMIN) 50 MG TAB    Take 1 Tablet by mouth 2 times a day.    DIPHENHYDRAMINE  "HCL (BENADRYL ALLERGY PO)    Take  by mouth.    FUROSEMIDE (LASIX) 40 MG TAB    Take 1 Tablet by mouth every day.    IPRATROPIUM (ATROVENT) 0.03 % SOLUTION    Administer 2 Sprays into affected nostril(S) every 12 hours.    LEVOTHYROXINE (SYNTHROID) 50 MCG TAB    Take 1 Tablet by mouth every morning on an empty stomach.    LISINOPRIL (PRINIVIL) 5 MG TAB    Take 1 Tablet by mouth every day.    LUTEIN 20 MG CAP    Take 1 Cap by mouth every day.    MAGNESIUM OXIDE (MAG-OX) 400 MG TAB TABLET    Take 1 Tablet by mouth every day.    MIRABEGRON ER (MYRBETRIQ) 50 MG TABLET SR 24 HR    Take 50 mg by mouth every day.    SENNOSIDES (SENOKOT PO)    Take  by mouth.    SERTRALINE (ZOLOFT) 50 MG TAB    TAKE ONE TABLET BY MOUTH EVERY DAY    VITAMIN D (CHOLECALCIFEROL) 1000 UNIT TABS    Take 2,000 Units by mouth every day.    WARFARIN (COUMADIN) 2.5 MG TAB    TAKE ONE TO ONE AND ONE-HALF TABLETS BY MOUTH EVERY DAY AS DIRECTED BY THE [facility]. Strength: 2.5 mg       ALLERGIES  Amiodarone, Bactrim, Cipro xr, Metoprolol, Morphine, Phytoplex z-guard [petrolatum-zinc oxide], Pseudoephedrine, Qvar [beclomethasone dipropionate], Vibramycin, Atorvastatin calcium-polysorbate 80, Augmentin, Diltiazem, Flecainide, Keflex, Mucinex, Tramadol, Atorvastatin, and Tape    PHYSICAL EXAM  BP (!) 170/94   Pulse 82   Temp 36.1 °C (97 °F) (Temporal)   Resp 18   Ht 1.702 m (5' 7\")   Wt 90.7 kg (200 lb)   LMP 01/01/1993   SpO2 97%   BMI 31.32 kg/m²     Pulse ox interpretation: I interpret this pulse ox as normal.  Constitutional: Alert in no apparent distress.  HENT: No signs of trauma, Bilateral external ears normal, Nose normal.   Eyes: Pupils are equal, Conjunctiva normal, Non-icteric.   Neck: Normal range of motion, No tenderness, Supple, No stridor.    Cardiovascular: Regular rate and rhythm, no murmurs. Symmetric distal pulses. No cyanosis of extremities. No peripheral edema of extremities.  Thorax & Lungs: Normal breath " sounds, No respiratory distress, No wheezing, No chest tenderness.   Abdomen: Soft, No tenderness, No masses, No pulsatile masses. No peritoneal signs.  Skin: Warm, Dry, No erythema, No rash.   Back: No midline bony tenderness,  Left paraspinal tenderness.  Musculoskeletal: Good range of motion in all major joints. No tenderness to palpation or major deformities noted.   Neurologic: Alert and Oriented x4, Normal motor function, Normal sensory function, No focal deficits noted. Full strength in lower extremities.   Psychiatric: Affect normal, Judgment normal, Mood normal.     DIAGNOSTIC STUDIES    Labs:   Results for orders placed or performed during the hospital encounter of 12/28/22   Urinalysis, Culture if Indicated    Specimen: Urine   Result Value Ref Range    Color Yellow     Character Clear     Specific Gravity 1.010 <1.035    Ph 7.0 5.0 - 8.0    Glucose Negative Negative mg/dL    Ketones Negative Negative mg/dL    Protein Negative Negative mg/dL    Bilirubin Negative Negative    Nitrite Negative Negative    Leukocyte Esterase Negative Negative    Occult Blood Negative Negative    Micro Urine Req see below    CBC WITH DIFFERENTIAL   Result Value Ref Range    WBC 5.2 4.8 - 10.8 K/uL    RBC 4.90 4.20 - 5.40 M/uL    Hemoglobin 15.5 12.0 - 16.0 g/dL    Hematocrit 47.5 (H) 37.0 - 47.0 %    MCV 96.9 81.4 - 97.8 fL    MCH 31.6 27.0 - 33.0 pg    MCHC 32.6 (L) 33.6 - 35.0 g/dL    RDW 47.1 35.9 - 50.0 fL    Platelet Count 240 164 - 446 K/uL    MPV 10.6 9.0 - 12.9 fL    Neutrophils-Polys 53.90 44.00 - 72.00 %    Lymphocytes 32.40 22.00 - 41.00 %    Monocytes 10.20 0.00 - 13.40 %    Eosinophils 2.50 0.00 - 6.90 %    Basophils 0.40 0.00 - 1.80 %    Immature Granulocytes 0.60 0.00 - 0.90 %    Nucleated RBC 0.00 /100 WBC    Neutrophils (Absolute) 2.80 2.00 - 7.15 K/uL    Lymphs (Absolute) 1.68 1.00 - 4.80 K/uL    Monos (Absolute) 0.53 0.00 - 0.85 K/uL    Eos (Absolute) 0.13 0.00 - 0.51 K/uL    Baso (Absolute) 0.02 0.00 -  0.12 K/uL    Immature Granulocytes (abs) 0.03 0.00 - 0.11 K/uL    NRBC (Absolute) 0.00 K/uL   CMP   Result Value Ref Range    Sodium 138 135 - 145 mmol/L    Potassium 3.5 (L) 3.6 - 5.5 mmol/L    Chloride 103 96 - 112 mmol/L    Co2 22 20 - 33 mmol/L    Anion Gap 13.0 7.0 - 16.0    Glucose 118 (H) 65 - 99 mg/dL    Bun 8 8 - 22 mg/dL    Creatinine 0.47 (L) 0.50 - 1.40 mg/dL    Calcium 8.4 8.4 - 10.2 mg/dL    AST(SGOT) 29 12 - 45 U/L    ALT(SGPT) 15 2 - 50 U/L    Alkaline Phosphatase 75 30 - 99 U/L    Total Bilirubin 0.5 0.1 - 1.5 mg/dL    Albumin 3.3 3.2 - 4.9 g/dL    Total Protein 6.2 6.0 - 8.2 g/dL    Globulin 2.9 1.9 - 3.5 g/dL    A-G Ratio 1.1 g/dL   CORRECTED CALCIUM   Result Value Ref Range    Correct Calcium 9.0 8.5 - 10.5 mg/dL   ESTIMATED GFR   Result Value Ref Range    GFR (CKD-EPI) 94 >60 mL/min/1.73 m 2   PT/INR   Result Value Ref Range    PT 13.2 12.0 - 14.6 sec    INR 1.01 0.87 - 1.13   PTT   Result Value Ref Range    APTT 28.3 24.7 - 36.0 sec   EKG   Result Value Ref Range    Report       [facility] Emergency Dept.    Test Date:  2022  Pt Name:    STEPHANIE DAVENPORT                 Department: [facility]  MRN:        [id number]                      Room:       [facility]  Gender:     Female                       Technician: [id number]  :        [id number]                   Requested By:SAM HUGHES II  Order #:    [id number]                    Reading MD: Sam Hughes II, MD    Measurements  Intervals                                Axis  Rate:       86                           P:          0  MI:         216                          QRS:        -73  QRSD:       124                          T:          85  QT:         412  QTc:        493    Interpretive Statements  A-V DUAL-PACED COMPLEXES W/ SOME INHIBITION  NO FURTHER ANALYSIS ATTEMPTED DUE TO PACED RHYTHM  Compared to ECG 01/10/2020 14:22:34  AV dual-paced complex(es) or rhythm no longer present  Electronically Signed On  2022 20:05:06 PST by Sam Hughes II, MD       *Note: Due to a large number of results and/or encounters for the requested time period, some results have not been displayed. A complete set of results can be found in Results Review.      All labs reviewed by me.    EKG:   Results for orders placed or performed during the hospital encounter of 22   EKG   Result Value Ref Range    Report       [facility] Emergency Dept.    Test Date:  2022  Pt Name:    STEPHANIE DAVENPORT                 Department: [facility]  MRN:        [id number]                      Room:       [facility]  Gender:     Female                       Technician: [id number]  :        [id number]                   Requested By:SAM HUGHES II  Order #:    [id number]                    Reading MD: Sma Hughes II, MD    Measurements  Intervals                                Axis  Rate:       86                           P:          0  MO:         216                          QRS:        -73  QRSD:       124                          T:          85  QT:         412  QTc:        493    Interpretive Statements  A-V DUAL-PACED COMPLEXES W/ SOME INHIBITION  NO FURTHER ANALYSIS ATTEMPTED DUE TO PACED RHYTHM  Compared to ECG 01/10/2020 14:22:34  AV dual-paced complex(es) or rhythm no longer present  Electronically Signed On 2022 20:05:06 PST by Sam Hughes II, MD       *Note: Due to a large number of results and/or encounters for the requested time period, some results have not been displayed. A complete set of results can be found in Results Review.       Radiology:   The attending Emergency Physician has independently interpreted the diagnostic imaging associated with this visit and is awaiting the final reading from the radiologist, which will be displayed below.     CT-RENAL COLIC EVALUATION(A/P W/O)   Final Result      1.  Asymmetric enlargement of the left psoas muscle at the level of the left kidney.  The and measures 6 x 3.3 x 2.5 cm in size. This most likely represents intramuscular hematoma.      2.  No evidence of renal or ureteral stone or evidence of hydronephrosis.      3.  Diverticulosis.      4.  Bibasilar patchy groundglass opacities possibly representing inflammatory process.      5.  Extensive atherosclerotic vascular calcification.      6.  This was discussed with ALEXSANDRA HUGHES II at 7:38 PM on 12/28/2022.           COURSE & MEDICAL DECISION MAKING    Nursing notes, vital signs, PMSFSHx reviewed in chart.    Differential diagnoses include but are not limited to: Lumbar strain, UTI, or kidney stone    Prior records reviewed, which indicate see hpi     Escalation of care considered, and ultimately not performed: none    Barriers to care at this time, including but not limited to: Select: Patient lacks transportation .     Diagnostic tests and prescription drugs considered, including but not limited to: none    In addition to the chief complaint, the following problems were addressed: Hypertension. This is a chronic problem that is not well controlled at the moment. LIkely she missed evening medications. Will be restarted by inpatient team.     The attending Emergency Physician discussed management of the patient with the following physicians and DANNY's:  [place] hospitalist.       PLAN AND DISPOSITION   5:12 PM - Patient seen and evaluated at bedside. Donte Magaña is a 82 y.o. female who presents with left-sided flank pain. Patient will be treated with Robaxin 750 mL for her symptoms. Ordered CT-renal colic evaluation (A/P W/O) to evaluate, labs were ordered according to triage protocol. She understands and agrees to the plan of care. She received Fentanyl and Zofran from EMS. EKG done at bedside due to acute dizziness and show a paced rhythm with no signs of MI.    7:39 PM  Radiology, Dr. Moe, contacted me regarding abnormal findings on CT.  The CT showed changes at the left psoas muscle  concerning for possible hematoma.  We discussed possibly repeating study with contrast but given the small size of abnormality, will hold off on contrast study for now.  Unlikely to provide much further information.  Her blood pressure has been on the hypertensive side and her hemoglobin is normal, does not seem as though she is still bleeding.  Her pain is also improved.  She does take warfarin and a INR has now been sent.    8:47 PM  INR subtherapeutic which is unexpected given spontaneous hematoma in the retroperitoneum.  Dr. Wali Richmond, hospitalist will admit for further observation.  I believe she needs further observation in the hospital for any acute worsening since this is an area that bleeding can be difficult to stop.  I think there is a chance of rebleeding as well with reinitiation of anticoagulation, may need bridging, and these discussions will be had during inpatient stay.    FINAL IMPRESSION   1. Retroperitoneal hemorrhage    2. Flank pain    3. Anticoagulated on warfarin    4. Subtherapeutic international normalized ratio (INR)         Annmarie HERNANDEZ (Mandeep), am scribing for, and in the presence of, BOLA Truong II.    Electronically signed by: Annmarie Jacques (Mandeep), 12/28/2022    ISam II, M* personally performed the services described in this documentation, as scribed by Annmarie Jacques in my presence, and it is both accurate and complete.    The note accurately reflects work and decisions made by me.  Sam Real II, M.D.  12/28/2022  7:41 PM

## 2022-12-29 NOTE — PROGRESS NOTES
Bedside report received from night RN. Assumed care of patient. Alert and oriented, wakes easily to voice. Daily plan of care discussed. Pt denies intolerable pain to left flank. Bed in lowest position, call light in place. Hourly rounding in place.

## 2022-12-29 NOTE — PROGRESS NOTES
[facility] called and attempted to complete Med Rec (Home Meds). Unable to complete. No answer.  unable to be left due to mailbox being full.

## 2022-12-29 NOTE — PROGRESS NOTES
Received patient from the Emergency Department. Patient ambulated from the [facility] to the hospital bed with one person assist, hand held. Patient is alert and oriented X 4. Safety precautions in place. Will continue to monitor patient.

## 2022-12-29 NOTE — ED NOTES
Urine sent to lab.   Pt's belongings placed in bag and at head of bed.  Pt denies any needs currently.

## 2022-12-29 NOTE — ED NOTES
Pt's family member called and updated on pt's admission status.   Reina--[phone]    David called and attempted to updated on pt's admit status. ANNE unable to be left due to mailbox full.    [phone]

## 2022-12-29 NOTE — CARE PLAN
The patient is Stable - Low risk of patient condition declining or worsening    Shift Goals  Clinical Goals: Free from falls or injuries, rest and sleep at least 4 hours, pain controlled 3/10  Patient Goals: Free from falls or injuries, rest and sleep at least 4 hours, pain controlled 3/10    Progress made toward(s) clinical / shift goals:  No falls or injuries, rested and slept, no complaints of pain    Patient is not progressing towards the following goals:

## 2022-12-29 NOTE — PROGRESS NOTES
Received call from laboratory. Pt tested positive for COVID. Provider made aware. Isolation protocols in place.

## 2022-12-30 ENCOUNTER — HOME HEALTH ADMISSION (OUTPATIENT)
Dept: HOME HEALTH SERVICES | Facility: HOME HEALTHCARE | Age: 82
End: 2022-12-30
Payer: MEDICARE

## 2022-12-30 VITALS
BODY MASS INDEX: 31.39 KG/M2 | TEMPERATURE: 98.1 F | HEART RATE: 83 BPM | OXYGEN SATURATION: 93 % | DIASTOLIC BLOOD PRESSURE: 49 MMHG | WEIGHT: 200 LBS | HEIGHT: 67 IN | RESPIRATION RATE: 18 BRPM | SYSTOLIC BLOOD PRESSURE: 120 MMHG

## 2022-12-30 PROBLEM — U07.1 COVID-19: Status: ACTIVE | Noted: 2022-12-30

## 2022-12-30 LAB
ANION GAP SERPL CALC-SCNC: 12 MMOL/L (ref 7–16)
BUN SERPL-MCNC: 9 MG/DL (ref 8–22)
CALCIUM SERPL-MCNC: 9.1 MG/DL (ref 8.4–10.2)
CHLORIDE SERPL-SCNC: 107 MMOL/L (ref 96–112)
CO2 SERPL-SCNC: 20 MMOL/L (ref 20–33)
CREAT SERPL-MCNC: 0.53 MG/DL (ref 0.5–1.4)
GFR SERPLBLD CREATININE-BSD FMLA CKD-EPI: 91 ML/MIN/1.73 M 2
GLUCOSE SERPL-MCNC: 117 MG/DL (ref 65–99)
HCT VFR BLD AUTO: 48.6 % (ref 37–47)
HGB BLD-MCNC: 15 G/DL (ref 12–16)
MAGNESIUM SERPL-MCNC: 2.1 MG/DL (ref 1.5–2.5)
PHOSPHATE SERPL-MCNC: 3 MG/DL (ref 2.5–4.5)
POTASSIUM SERPL-SCNC: 4.9 MMOL/L (ref 3.6–5.5)
SODIUM SERPL-SCNC: 139 MMOL/L (ref 135–145)

## 2022-12-30 PROCEDURE — 84100 ASSAY OF PHOSPHORUS: CPT

## 2022-12-30 PROCEDURE — 36415 COLL VENOUS BLD VENIPUNCTURE: CPT

## 2022-12-30 PROCEDURE — 700102 HCHG RX REV CODE 250 W/ 637 OVERRIDE(OP): Performed by: INTERNAL MEDICINE

## 2022-12-30 PROCEDURE — 85014 HEMATOCRIT: CPT

## 2022-12-30 PROCEDURE — 85018 HEMOGLOBIN: CPT

## 2022-12-30 PROCEDURE — A9270 NON-COVERED ITEM OR SERVICE: HCPCS | Performed by: HOSPITALIST

## 2022-12-30 PROCEDURE — 99239 HOSP IP/OBS DSCHRG MGMT >30: CPT | Performed by: INTERNAL MEDICINE

## 2022-12-30 PROCEDURE — A9270 NON-COVERED ITEM OR SERVICE: HCPCS | Performed by: INTERNAL MEDICINE

## 2022-12-30 PROCEDURE — 700102 HCHG RX REV CODE 250 W/ 637 OVERRIDE(OP): Performed by: HOSPITALIST

## 2022-12-30 PROCEDURE — 97165 OT EVAL LOW COMPLEX 30 MIN: CPT

## 2022-12-30 PROCEDURE — 80048 BASIC METABOLIC PNL TOTAL CA: CPT

## 2022-12-30 PROCEDURE — 83735 ASSAY OF MAGNESIUM: CPT

## 2022-12-30 RX ADMIN — SENNOSIDES AND DOCUSATE SODIUM 2 TABLET: 50; 8.6 TABLET ORAL at 05:04

## 2022-12-30 RX ADMIN — SERTRALINE HYDROCHLORIDE 50 MG: 50 TABLET ORAL at 05:04

## 2022-12-30 RX ADMIN — LISINOPRIL 5 MG: 5 TABLET ORAL at 05:05

## 2022-12-30 RX ADMIN — ATENOLOL 50 MG: 25 TABLET ORAL at 05:04

## 2022-12-30 RX ADMIN — LEVOTHYROXINE SODIUM 50 MCG: 50 TABLET ORAL at 05:04

## 2022-12-30 RX ADMIN — ACETAMINOPHEN 1000 MG: 500 TABLET ORAL at 09:03

## 2022-12-30 RX ADMIN — ACETAMINOPHEN 1000 MG: 500 TABLET ORAL at 15:16

## 2022-12-30 ASSESSMENT — COGNITIVE AND FUNCTIONAL STATUS - GENERAL
SUGGESTED CMS G CODE MODIFIER DAILY ACTIVITY: CJ
DRESSING REGULAR LOWER BODY CLOTHING: A LITTLE
TOILETING: A LITTLE
DAILY ACTIVITIY SCORE: 21
HELP NEEDED FOR BATHING: A LITTLE

## 2022-12-30 ASSESSMENT — ENCOUNTER SYMPTOMS
BACK PAIN: 0
FALLS: 0
DOUBLE VISION: 0
CHILLS: 0
BLURRED VISION: 0
COUGH: 0
FEVER: 0
HEARTBURN: 0
DIZZINESS: 0
ABDOMINAL PAIN: 0
NERVOUS/ANXIOUS: 0
PALPITATIONS: 0
SHORTNESS OF BREATH: 0
VOMITING: 0
HEADACHES: 0

## 2022-12-30 ASSESSMENT — PAIN DESCRIPTION - PAIN TYPE: TYPE: ACUTE PAIN

## 2022-12-30 ASSESSMENT — ACTIVITIES OF DAILY LIVING (ADL): TOILETING: INDEPENDENT

## 2022-12-30 ASSESSMENT — LIFESTYLE VARIABLES: SUBSTANCE_ABUSE: 0

## 2022-12-30 NOTE — PROGRESS NOTES
Bedside report received from night RN. Assumed care of patient. Alert and oriented, wakes easily to voice. Daily plan of care discussed. Pt denies intolerable pain to left flank. Pt resting comfortably in bed. Hourly rounding in place.

## 2022-12-30 NOTE — DISCHARGE PLANNING
ATTN: Case Management  RE: Referral for Home Health    As of 12/30/2022, we have accepted the Home Health referral for the patient listed above.    A [facility] clinician will be out to see the patient within 48 hours. If you have any questions or concerns regarding the patient's transition to Home Health, please do not hesitate to contact us at [phone].      We look forward to collaborating with you,  [facility] Health Team

## 2022-12-30 NOTE — DISCHARGE SUMMARY
"Discharge Summary    CHIEF COMPLAINT ON ADMISSION  Chief Complaint   Patient presents with    Flank Pain     Pt woke up with left lower back/flank pain, states \"urine is darker than normal lately,\" denies any falls or injuries at [facility] care facility.  Pt given 50mcg fentanyl IV and 4mg zofran IV en route via EMS.  Pt at baseline of A+Ox2.   Takes warfarin for pacemaker. VSS upon arrival.        Reason for Admission  EMS     Admission Date  12/28/2022    CODE STATUS  Full Code    HPI & HOSPITAL COURSE  As per chart review:  \"82 y.o. female with a past medical history of atrial fibrillation on anticoagulation with warfarin, primary hypertension and hypothyroidism who presented 12/28/2022 with left-sided flank pain.  Symptoms started on the morning of admission as severe pain.  Pain is rated 10/10.  Pain is worse with movement.  No relieving factors.  The pain does not radiate to other places.  Patient denies having falls or trauma to the region.  The patient denies noticing any bruising or bleeding from other sites.  Patient reports associated lightheadedness and dizziness.\"    Patient was admitted for further management and care.  Initially on admission patient seems to be confused.  After discussing with the patient's daughter the patient could have underlying cognitive disorder however she has not been diagnosed with dementia.  The patient was tested for COVID and she tested positive for COVID.  However the patient is on room air, steroids were not started.    We discussed the case with general surgery, with Dr. Miller, he reviewed the CT scan and recommended against any type of draining the hematoma at this time.  It is unclear as to why the hematoma formed, the patient and the patient's daughter do not believe that she fell, however the patient's daughter does mention that she has been having trouble ambulating lately.  Due to the CT scan findings and the concerns of falling, we discussed at length stopping " anticoagulation and the daughter would like to stop it for now and reassess with cardiologist and PCP as an outpatient.    Of note hemoglobin throughout hospitalization remained stable.  Also the daughter mentions that the patient had not been eating well for the last couple of days prior to admission, this could have led to the patient's decline as well.  However the patient improved clinically while hospitalized.    Today the patient is denying any type of pain.  She seems more alert today.  The patient currently not complaining of any symptoms.  We did have physical therapy evaluate the patient and did not recommend further interventions.  The patient will be discharged back to her home at [place].    The patient will require close follow-up with PCP as an outpatient.  The patient might require repeat imaging in a couple of weeks to make sure that hematoma is resolving.    Therefore, she is discharged in fair and stable condition to home with close outpatient follow-up.    The patient met 2-midnight criteria for an inpatient stay at the time of discharge.    Discharge Date  12/30/2022    FOLLOW UP ITEMS POST DISCHARGE  Patient will require close follow up with PCP and repeat imaging as outpatient, deferred to PCP.    DISCHARGE DIAGNOSES  Principal Problem:    Psoas hematoma, left, secondary to anticoagulant therapy POA: Yes  Active Problems:    Hypothyroidism POA: Yes    Persistent atrial fibrillation (HCC) POA: Yes    Essential hypertension POA: Yes    Stage 3a chronic kidney disease (HCC) POA: Yes    Hypokalemia POA: Yes    Generalized weakness POA: Unknown    COVID-19 POA: Unknown  Resolved Problems:    * No resolved hospital problems. *      FOLLOW UP  No future appointments.  GURDEEP [email or web address].  [address] [id number] [phone]    Schedule an appointment as soon as possible for a visit        MEDICATIONS ON DISCHARGE     Medication List        CONTINUE taking these  medications        Instructions   Acetaminophen 500 MG Caps   Take 2 Caps by mouth 3 times a day as needed. Indications: Pain  Dose: 2 Capsule     atenolol 50 MG Tabs  Commonly known as: TENORMIN   Take 50 mg by mouth every day.  Dose: 50 mg     furosemide 40 MG Tabs  Commonly known as: LASIX   Take 1 Tablet by mouth every day.  Dose: 40 mg     ipratropium 0.03 % Soln  Commonly known as: ATROVENT   Administer 2 Sprays into affected nostril(S) every 12 hours.  Dose: 2 Spray     levothyroxine 50 MCG Tabs  Commonly known as: SYNTHROID   Take 1 Tablet by mouth every morning on an empty stomach.  Dose: 50 mcg     lisinopril 5 MG Tabs  Commonly known as: PRINIVIL   Doctor's comments: Please call to schedule follow up appointment for further refills. Please call [phone]. Thank you.  Take 1 Tablet by mouth every day.  Dose: 5 mg     Lutein 20 MG Caps   Take 1 Cap by mouth every day.  Dose: 1 Capsule     magnesium oxide 400 MG Tabs tablet  Commonly known as: MAG-OX   Take 1 Tablet by mouth every day.  Dose: 400 mg     Myrbetriq 50 MG Tb24  Generic drug: Mirabegron ER   Take 50 mg by mouth every day.  Dose: 50 mg     PREBIOTIC PRODUCT PO   Take 2 Tablets by mouth every day.  Dose: 2 Tablet     SENOKOT PO   Take 1 Tablet by mouth 1 time a day as needed (constipation).  Dose: 1 Tablet     sertraline 50 MG Tabs  Commonly known as: Zoloft   TAKE ONE TABLET BY MOUTH EVERY DAY     solifenacin 5 MG tablet  Commonly known as: VESICARE   Take 5 mg by mouth every day.  Dose: 5 mg     vitamin D 2000 UNIT Tabs   Take 2,000 Units by mouth every day.  Dose: 2,000 Units            STOP taking these medications      diphenhydrAMINE 25 MG capsule  Commonly known as: BENADRYL     warfarin 2.5 MG Tabs  Commonly known as: COUMADIN              Allergies  Allergies   Allergen Reactions    Amiodarone Hives     Throat and tongue itching    Bactrim Shortness of Breath    Cipro Xr Swelling    Metoprolol Swelling     Causes throat swelling     "Morphine Unspecified     Hallucinations    Phytoplex Z-Guard [Petrolatum-Zinc Oxide] Unspecified     \"causes burning\"    Pseudoephedrine Palpitations    Qvar [Beclomethasone Dipropionate] Unspecified     Pressure on heart      Vibramycin Shortness of Breath    Atorvastatin Calcium-Polysorbate 80 Unspecified     Muscle aches      Augmentin Unspecified     Unknown reaction    Diltiazem Rash     rash    Flecainide Unspecified     dizziness    Keflex Unspecified     Pt states \"Unsure\".    Mucinex Unspecified     GI Distress      Tramadol Unspecified     crying    Atorvastatin Myalgia    Tape Rash     Paper tape okay       DIET  Orders Placed This Encounter   Procedures    Diet Order Diet: Cardiac     Standing Status:   Standing     Number of Occurrences:   1     Order Specific Question:   Diet:     Answer:   Cardiac [6]       ACTIVITY  As tolerated.  Weight bearing as tolerated    CONSULTATIONS  General Surgery    PROCEDURES      CT-RENAL COLIC EVALUATION(A/P W/O)   Final Result      1.  Asymmetric enlargement of the left psoas muscle at the level of the left kidney. The and measures 6 x 3.3 x 2.5 cm in size. This most likely represents intramuscular hematoma.      2.  No evidence of renal or ureteral stone or evidence of hydronephrosis.      3.  Diverticulosis.      4.  Bibasilar patchy groundglass opacities possibly representing inflammatory process.      5.  Extensive atherosclerotic vascular calcification.      6.  This was discussed with ALEXSANDRA HUGHES II at 7:38 PM on 12/28/2022.           LABORATORY  Lab Results   Component Value Date    SODIUM 139 12/30/2022    POTASSIUM 4.9 12/30/2022    CHLORIDE 107 12/30/2022    CO2 20 12/30/2022    GLUCOSE 117 (H) 12/30/2022    BUN 9 12/30/2022    CREATININE 0.53 12/30/2022    CREATININE 0.78 07/29/2010    GLOMRATE >59 07/29/2010        Lab Results   Component Value Date    WBC 6.5 12/29/2022    WBC 9.3 07/29/2010    HEMOGLOBIN 15.0 12/30/2022    HEMATOCRIT 48.6 (H) " 12/30/2022    PLATELETCT 212 12/29/2022        Total time of the discharge process exceeds 35 minutes.

## 2022-12-30 NOTE — CARE PLAN
The patient is Stable - Low risk of patient condition declining or worsening    Shift Goals  Clinical Goals: monitor Hemoglobin for <7  Patient Goals: rest and comfort  Family Goals: n/a    Progress made toward(s) clinical / shift goals:    Problem: Knowledge Deficit - Standard  Goal: Patient and family/care givers will demonstrate understanding of plan of care, disease process/condition, diagnostic tests and medications  Outcome: Progressing       Patient is not progressing towards the following goals:

## 2022-12-30 NOTE — ASSESSMENT & PLAN NOTE
Patient tested positive for COVID-19  Seems to be asymptomatic  No need to start steroids at this time  On room air  monitor

## 2022-12-30 NOTE — FACE TO FACE
Face to Face Supporting Documentation - Home Health    The encounter with this patient was in whole or in part the primary reason for home health admission.    Date of encounter:   Patient:                    MRN:                       YOB: 2024  Donte Magaña  [id number]  1940     Home health to see patient for:  Skilled Nursing care for assessment, interventions & education, Physical Therapy evaluation and treatment, and Occupational therapy evaluation and treatment    Skilled need for:  Recent Deterioration of Health Status patient with suspected underlying cognitive disorder and generalized weakness.    Skilled nursing interventions to include:  Comment: as per PT/OT    Homebound status evidenced by:  Needs the assistance of another person in order to leave the home. Leaving home requires a considerable and taxing effort. There is a normal inability to leave the home.    Community Physician to provide follow up care: VICTORIANO Montes     Optional Interventions? Yes, Details: as per PT/OT recommendations      I certify the face to face encounter for this home health care referral meets the CMS requirements and the encounter/clinical assessment with the patient was, in whole, or in part, for the medical condition(s) listed above, which is the primary reason for home health care. Based on my clinical findings: the service(s) are medically necessary, support the need for home health care, and the homebound criteria are met.  I certify that this patient has had a face to face encounter by myself.  Bo Alonso M.D. - NPI: [id number]

## 2022-12-30 NOTE — DISCHARGE PLANNING
Received Choice form at [phone]  Agency/Facility Name: [facility]  Referral sent per Choice form @ [phone]

## 2022-12-30 NOTE — CARE PLAN
The patient is Stable - Low risk of patient condition declining or worsening    Shift Goals  Clinical Goals: pain control <3/10, free from falls or injuries  Patient Goals: rest and comfort  Family Goals: n/a    Progress made toward(s) clinical / shift goals: Pt remains free from fall/injury through the shift. Complains of pain managed by scheduled pain medications.     Patient is not progressing towards the following goals:N/A

## 2022-12-30 NOTE — PROGRESS NOTES
"Hospital Medicine Daily Progress Note    Date of Service  12/30/2022    Chief Complaint  Donte Magaña is a 82 y.o. female admitted 12/28/2022 with flank pain.    Hospital Course  As per chart review:  \"82 y.o. female with a past medical history of atrial fibrillation on anticoagulation with warfarin, primary hypertension and hypothyroidism who presented 12/28/2022 with left-sided flank pain.  Symptoms started on the morning of admission as severe pain.  Pain is rated 10/10.  Pain is worse with movement.  No relieving factors.  The pain does not radiate to other places.  Patient denies having falls or trauma to the region.  The patient denies noticing any bruising or bleeding from other sites.  Patient reports associated lightheadedness and dizziness.\"    Interval Problem Update  12/29: Patient seen at bedside this morning.  No family members present at bedside, however I was able to talk to the patient's daughter over the phone.  The patient at the time of my evaluation was somewhat confused saying that it was 1989.  This might be her baseline.  After talking to the patient's daughter it seems the patient does have some cognitive decline, however it does not seem that she has been diagnosed with dementia.  The daughter told me that she has the flu, we will order COVID and flu test.  It is unclear the etiology of the patient's hematoma.  We have consulted general surgery to see if the patient would warrant drainage of the hematoma.  Appreciate further recommendations.  It seems that hemoglobin seems to be stable.  We will continue to hold anticoagulation.  After discussing the risk and benefits of anticoagulation, for now the patient's daughter who makes decisions for the patient would like to stop anticoagulation for now until evaluated by PCP as an outpatient.  So pending PT/OT evaluation.  Continue to monitor closely.    12/30: Patient seen at bedside this morning.  No family members present at the time of my " evaluation, however I did talk to the patient's daughter over the phone to give her an update.  The patient denying any pain today.  The patient mentions she feels much better.  Today at the time of my evaluation the patient is more alert than yesterday.  I still believe that she has some underlying cognitive disorder that should be monitored as an outpatient with PCP.  Patient tested positive for COVID-19.  The patient is medically clear for discharge, however we are awaiting to see if the patient can go back to her assisted facility.  We appreciate further recommendations by case management.  Discussed the case with general surgery who did not recommend intervention for the hematoma at this time and just monitoring.  The patient might benefit from a repeat imaging study in a couple weeks as an outpatient.  Will defer to PCP.    I have discussed this patient's plan of care and discharge plan at IDT rounds today with Case Management, Nursing, Nursing leadership, and other members of the IDT team.    Consultants/Specialty  general surgery    Code Status  Full Code    Disposition  Patient is medically cleared for discharge.   Anticipate discharge to pending PT/OT evaluation.    Review of Systems  Review of Systems   Constitutional:  Negative for chills and fever.   HENT:  Negative for hearing loss and nosebleeds.    Eyes:  Negative for blurred vision and double vision.   Respiratory:  Negative for cough and shortness of breath.    Cardiovascular:  Negative for chest pain and palpitations.   Gastrointestinal:  Negative for abdominal pain, heartburn and vomiting.   Genitourinary:  Negative for dysuria and urgency.   Musculoskeletal:  Negative for back pain and falls.   Skin:  Negative for itching and rash.   Neurological:  Negative for dizziness and headaches.   Psychiatric/Behavioral:  Negative for substance abuse. The patient is not nervous/anxious.    All other systems reviewed and are negative.     Physical  Exam  Temp:  [36.3 °C (97.3 °F)-36.9 °C (98.4 °F)] 36.7 °C (98.1 °F)  Pulse:  [80-90] 83  Resp:  [16-18] 18  BP: (105-157)/(49-85) 120/49  SpO2:  [91 %-95 %] 93 %    Physical Exam  Vitals and nursing note reviewed.   Constitutional:       General: She is not in acute distress.     Appearance: She is ill-appearing.   HENT:      Head: Normocephalic and atraumatic.      Right Ear: External ear normal.      Left Ear: External ear normal.      Mouth/Throat:      Pharynx: No oropharyngeal exudate or posterior oropharyngeal erythema.   Eyes:      General:         Right eye: No discharge.         Left eye: No discharge.   Cardiovascular:      Rate and Rhythm: Normal rate and regular rhythm.      Pulses: Normal pulses.      Heart sounds: No murmur heard.    No gallop.   Pulmonary:      Effort: Pulmonary effort is normal. No respiratory distress.      Breath sounds: Normal breath sounds. No wheezing or rhonchi.   Abdominal:      General: Bowel sounds are normal. There is no distension.      Palpations: Abdomen is soft.      Tenderness: There is abdominal tenderness. There is no guarding.   Musculoskeletal:         General: Tenderness present. No swelling. Normal range of motion.      Cervical back: Normal range of motion and neck supple. No tenderness.   Skin:     General: Skin is warm and dry.   Neurological:      Mental Status: She is alert. She is disoriented.      Motor: No weakness.   Psychiatric:         Mood and Affect: Mood normal.         Behavior: Behavior normal.       Fluids    Intake/Output Summary (Last 24 hours) at 12/30/2022 1140  Last data filed at 12/30/2022 0800  Gross per 24 hour   Intake 360 ml   Output --   Net 360 ml         Laboratory  Recent Labs     12/28/22  1540 12/28/22  2251 12/29/22  0452 12/29/22  1642 12/30/22  0249   WBC 5.2 6.0 6.5  --   --    RBC 4.90 4.82 4.86  --   --    HEMOGLOBIN 15.5 14.9 15.2 15.1 15.0   HEMATOCRIT 47.5* 46.4 46.4 47.2* 48.6*   MCV 96.9 96.3 95.5  --   --    MCH 31.6  30.9 31.3  --   --    MCHC 32.6* 32.1* 32.8*  --   --    RDW 47.1 46.4 46.2  --   --    PLATELETCT 240 228 212  --   --    MPV 10.6 10.1 10.1  --   --        Recent Labs     12/28/22  1540 12/29/22  0452 12/30/22  0249   SODIUM 138 138 139   POTASSIUM 3.5* 4.7 4.9   CHLORIDE 103 105 107   CO2 22 23 20   GLUCOSE 118* 131* 117*   BUN 8 7* 9   CREATININE 0.47* 0.54 0.53   CALCIUM 8.4 9.0 9.1       Recent Labs     12/28/22  1950   APTT 28.3   INR 1.01                 Imaging  CT-RENAL COLIC EVALUATION(A/P W/O)   Final Result      1.  Asymmetric enlargement of the left psoas muscle at the level of the left kidney. The and measures 6 x 3.3 x 2.5 cm in size. This most likely represents intramuscular hematoma.      2.  No evidence of renal or ureteral stone or evidence of hydronephrosis.      3.  Diverticulosis.      4.  Bibasilar patchy groundglass opacities possibly representing inflammatory process.      5.  Extensive atherosclerotic vascular calcification.      6.  This was discussed with ALEXSANDRA HUGHES II at 7:38 PM on 12/28/2022.             Assessment/Plan  * Psoas hematoma, left, secondary to anticoagulant therapy- (present on admission)  Assessment & Plan  Strangely her INR is sub-therapeutic  Presents with left flank pain  CT imaging is showing asymmetric enlargement of the left psoas muscle at the level of the left kidney. The and measures 6 x 3.3 x 2.5 cm in size   We will trend hemoglobin levels  We will hold anticoagulation for now, consider restarting according to clinical course and serial hemoglobin/pain trend    12/29: Hemoglobin seems to be stable. We have consulted surgery to see if patient would benefit from draining such hematoma. We appreciate further recommendations.    12/30: I discussed findings with Dr Miller and he recommends just monitoring and not draining the hematoma. Hb is stable. Anticoagulation on hold for now. Patient will require close follow up as outpatient.    COVID-19  Assessment &  Plan  Patient tested positive for COVID-19  Seems to be asymptomatic  No need to start steroids at this time  On room air  monitor    Generalized weakness  Assessment & Plan  PT/OT    Hypokalemia- (present on admission)  Assessment & Plan  Replacing, checking magnesium    Continue to monitor replace as needed     12/29: resolved    Stage 3a chronic kidney disease (HCC)- (present on admission)  Assessment & Plan  As per chart review, hx of it seems  Avoid/minimize nephrotoxins as much as possible, renally dose medications, monitor inputs and outputs     Essential hypertension- (present on admission)  Assessment & Plan  Resume home atenolol, lisinopril with hold parameters    Persistent atrial fibrillation (HCC)- (present on admission)  Assessment & Plan  Resume home atenolol with hold parameters  We will hold home warfarin for now given her hematoma, consider restarting according to clinical course    Hypothyroidism- (present on admission)  Assessment & Plan  Resume home levothyroxine         VTE prophylaxis: SCDs/TEDs    I have performed a physical exam and reviewed and updated ROS and Plan today (12/30/2022). In review of yesterday's note (12/29/2022), there are no changes except as documented above.

## 2022-12-30 NOTE — DISCHARGE PLANNING
Case Management Discharge Planning    Admission Date: 12/28/2022  GMLOS: 3.6  ALOS: 2    6-Clicks ADL Score: 19  6-Clicks Mobility Score: 16  PT and/or OT Eval ordered: Yes  Post-acute Referrals Ordered: No  Post-acute Choice Obtained: NA  Has referral(s) been sent to post-acute provider:  JONEL      Anticipated Discharge Dispo: Discharge Disposition: Discharged to home/self care (01)    DME Needed: No    Action(s) Taken: Updated Provider/Nurse on Discharge Plan    1011: RN CM attempted to call [facility] admission team, no answer, VM left requesting call back.     1139: RN ANTOINETTE called [facility] and spoke with Michaela who states admissions is still in a meeting and will call RN ANTOINETTE back.     1255: RN ANTOINETTE called and spoke with Kahty from [facility] who states patient can return to community and will quarantine in her room.  Kathy requested RN CM set up transportation back.  RN ANTOINETTE called [facility] and spoke with Marie and scheduled transportation for today between [phone] (reservation #[id number]). MD and RN notified.     1342: RN CM notified Reina and patient of transportation.  Reina inquiring about lost keys.  RN CM asked [facility] to look.  RN CM also requested bedside RN look thru patient belongings.     1420: [facility] choice collected and faxed to [facility].     Escalations Completed: None    Medically Clear: Yes    Next Steps: None    Barriers to Discharge: None

## 2022-12-30 NOTE — PROGRESS NOTES
Pt is supine in bed when received. No complaints of pain at this time. Verbalizes needs well and demonstrates use of call bell. Call bell in reach    Chart check completed

## 2022-12-30 NOTE — THERAPY
Physical Therapy   Initial Evaluation     Patient Name: Donte Magaña  Age:  82 y.o., Sex:  female  Medical Record #: [id number]  Today's Date: 12/29/2022     Precautions  Precautions: (P) Fall Risk    Assessment  Patient is 82 y.o. female who has a past medical history of atrial fibrillation on anticoagulation with warfarin, primary hypertension and hypothyroidism who presented 12/28/2022 with left-sided flank pain. Pt also presents with COVID-19. Pt was agreeable to therapy evaluation and presented with multiple functional deficits which are at her baseline. Pt reports of being non-ambulatory at baseline and typically transfer to scooter and mobilizes at an indep level. Pt states she has not ambulated for a long time due to knee pain and weakness. Pt states she has assist from [facility] if needed, however, states she is primarily indep with transfers and scooter use. Pt was able to demo SBA to [facility] for transfer. Pt appears to be at her baseline with upright mobility at this time. No acute skilled therapy services needed at this time. Encouraged pt and nsg staff to mobilize to chair at least 3x/day to prevent further deconditioning while in acute setting. Anticipate pt to d/c home once medically clear.     Plan    Physical Therapy Initial Treatment Plan   Duration: (P) Other (See Comments) (d/c needs only)    DC Equipment Recommendations: (P) None  Discharge Recommendations: (P) Anticipate that the patient will have no further physical therapy needs after discharge from the hospital ([place] pt d/c back to [facility] with 24/7 assist)     Objective     12/29/22 1608   Initial Contact Note    Initial Contact Note Order Received and Verified, Physical Therapy Evaluation in Progress with Full Report to Follow.   Precautions   Precautions Fall Risk   Pain 0 - 10 Group   Location Flank   Location Orientation Left   Description Aching   Therapist Pain Assessment Prior to Activity;During Activity;Post Activity;Nurse Notified;1    Prior Living Situation   Housing / Facility Assisted Living Residence   Steps Into Home 0   Steps In Home 0   Equipment Owned Scooter   Lives with - Patient's Self Care Capacity Attendant / Paid Care Giver   Comments pt states she has 24/7 assist if needed from [facility]   Prior Level of Functional Mobility   Bed Mobility Independent   Transfer Status Independent   Ambulation Dependent   Distance Ambulation (Feet)   (non ambulatory per patient)   Assistive Devices Used Scooter   Wheelchair Independent   Comments pt reports of an IPLOF with transfers, states she is able to stand and take a step into scooter   History of Falls   History of Falls No   Cognition    Cognition / Consciousness WDL   Level of Consciousness Alert   Comments pleasant/cooperative; increased processing times at times, difficult to remember date   Passive ROM Upper Body   Passive ROM Upper Body WDL   Active ROM Upper Body   Active ROM Upper Body  WDL   Strength Upper Body   Upper Body Strength  WDL   Sensation Upper Body   Upper Extremity Sensation  WDL   Upper Body Muscle Tone   Upper Body Muscle Tone  WDL   Passive ROM Lower Body   Passive ROM Lower Body WDL   Active ROM Lower Body    Active ROM Lower Body  WDL   Strength Lower Body   Lower Body Strength  X   Comments has limited strength at baseline in B LE, however, only functional for transfers   Sensation Lower Body   Lower Extremity Sensation   WDL   Lower Body Muscle Tone   Lower Body Muscle Tone  WDL   Neurological Concerns   Neurological Concerns No   Coordination Upper Body   Coordination WDL   Coordination Lower Body    Coordination Lower Body  WDL   Balance Assessment   Sitting Balance (Static) Good   Sitting Balance (Dynamic) Fair +   Standing Balance (Static) Fair +   Standing Balance (Dynamic) Fair +   Weight Shift Sitting Good   Weight Shift Standing Fair   Comments slight LOB during transfer, however, able to self correct   Bed Mobility    Supine to Sit Supervised   Sit to Supine  Supervised   Scooting Supervised   Rolling Supervised   Gait Analysis   Gait Level Of Assist Unable to Participate   Comments pt states she does not ambulate at baseline   Functional Mobility   Sit to Stand Standby Assist   Bed, Chair, Wheelchair Transfer Contact Guard Assist   Transfer Method Stand Step   Mobility EOB, sit<>stand, transfer to chair   How much difficulty does the patient currently have...   Turning over in bed (including adjusting bedclothes, sheets and blankets)? 4   Sitting down on and standing up from a chair with arms (e.g., wheelchair, bedside commode, etc.) 3   Moving from lying on back to sitting on the side of the bed? 4   How much help from another person does the patient currently need...   Moving to and from a bed to a chair (including a wheelchair)? 3   Need to walk in a hospital room? 1   Climbing 3-5 steps with a railing? 1   6 clicks Mobility Score 16   Activity Tolerance   Sitting in Chair functional   Sitting Edge of Bed 5 mins   Standing 30 seconds for transfer   Edema / Skin Assessment   Edema / Skin  Not Assessed   Education Group   Education Provided Role of Physical Therapist   Role of Physical Therapist Patient Response Patient;Acceptance;Explanation;Demonstration;Verbal Demonstration;Action Demonstration   Physical Therapy Initial Treatment Plan    Duration Other (See Comments)  (d/c needs only)   Anticipated Discharge Equipment and Recommendations   DC Equipment Recommendations None   Discharge Recommendations Anticipate that the patient will have no further physical therapy needs after discharge from the hospital  ([facility] pt d/c back to [facility] with 24/7 assist)   Interdisciplinary Plan of Care Collaboration   IDT Collaboration with  Nursing   Patient Position at End of Therapy Seated;Call Light within Reach;Tray Table within Reach;Phone within Reach   Collaboration Comments aware of visit and recs   Session Information   Date / Session Number  12/29 d/c needs only    Priority 0

## 2022-12-31 NOTE — THERAPY
"Occupational Therapy   Initial Evaluation     Patient Name: Donte Magaña  Age:  82 y.o., Sex:  female  Medical Record #: [id number]  Today's Date: 12/30/2022     Precautions  Precautions: (P) Fall Risk    Assessment  Patient is 82 y.o. female admitted with left-sided flank pain. Covid. Hx of Afib. Pt lives in a halfway where she is indep with ADL's using scooter. Has been non-ambulatory for awhile. Performs transfers with CGA, seated grooming, LB/UB drsg with SBA. Uses bsc for toileting. Appears to be near her baseline; no further acute OT needs.           Plan  Eval only.     DC Equipment Recommendations: (P) None  Discharge Recommendations: (P) Anticipate that the patient will have no further occupational therapy needs after discharge from the hospital     Subjective  \"I'm fine\"     Objective     12/30/22 1201   Prior Living Situation   Prior Services Intermittent Physical Support for ADL Per Service;Housekeeping / Homemaker Services   Housing / Facility Assisted Living Residence   Bathroom Set up Walk In Shower;Shower Chair   Equipment Owned Scooter;Tub / Shower Seat;Grab Bar(s) In Tub / Shower;Grab Bar(s) By Toilet   Lives with - Patient's Self Care Capacity Attendant / Paid Care Giver   Comments Lives at [place]. Non-ambulatory for several yrs   Prior Level of ADL Function   Self Feeding Independent   Grooming / Hygiene Independent   Bathing Independent   Dressing Independent   Toileting Independent   Prior Level of IADL Function   Medication Management Independent   Laundry Requires Assist   Kitchen Mobility Independent   Finances Unable To Determine At This Time   Home Management Unable To Determine At This Time   Shopping Requires Assist   Prior Level Of Mobility Uses Wheel Chair for in Home Mobility   Driving / Transportation Relatives / Others Provide Transportation   Occupation (Pre-Hospital Vocational) Retired Due To Age   Leisure Interests Unable To Determine At This Time   Precautions   Precautions " "Fall Risk   Vitals   O2 Delivery Device None - Room Air   Cognition    Cognition / Consciousness WDL   Level of Consciousness Alert   Passive ROM Upper Body   Passive ROM Upper Body WDL   Active ROM Upper Body   Active ROM Upper Body  WDL   Strength Upper Body   Upper Body Strength  WDL   Sensation Upper Body   Upper Extremity Sensation  WDL   Upper Body Muscle Tone   Upper Body Muscle Tone  WDL   Coordination Upper Body   Coordination WDL   Balance Assessment   Sitting Balance (Static) Good   Sitting Balance (Dynamic) Good   Standing Balance (Static) Fair +   Standing Balance (Dynamic) Fair   Weight Shift Sitting Good   Weight Shift Standing Fair   Bed Mobility    Supine to Sit Supervised   How much help from another person does the patient currently need...   Putting on and taking off regular lower body clothing? 3   Bathing (including washing, rinsing, and drying)? 3   Toileting, which includes using a toilet, bedpan, or urinal? 3   Putting on and taking off regular upper body clothing? 4   Taking care of personal grooming such as brushing teeth? 4   Eating meals? 4   6 Clicks Daily Activity Score 21   Functional Mobility   Sit to Stand Standby Assist   Bed, Chair, Wheelchair Transfer Contact Guard Assist   Toilet Transfers Contact Guard Assist  (bsc)   Transfer Method Stand Step   Comments transfers only   Activity Tolerance   Sitting in Chair yes   Sitting Edge of Bed 12   Standing 1\"   Education Group   Role of Occupational Therapist Patient Response Patient;Acceptance;Explanation   Anticipated Discharge Equipment and Recommendations   DC Equipment Recommendations None   Discharge Recommendations Anticipate that the patient will have no further occupational therapy needs after discharge from the hospital                 "

## 2022-12-31 NOTE — PROGRESS NOTES
Pt discharged with MD order. Discharge instructions and follow-up appointments reviewed. Pt verbalized understanding. Copies of discharge papers given, all questions answered.  Transported via wheelchair with GMT.

## 2023-01-03 ENCOUNTER — PATIENT OUTREACH (OUTPATIENT)
Dept: MEDICAL GROUP | Facility: PHYSICIAN GROUP | Age: 83
End: 2023-01-03
Payer: MEDICARE

## 2023-01-04 NOTE — PROGRESS NOTES
This patient qualifies for a Transitional Care Management visit as they are being seen within the required time and two attempts were made to contact this patient within two business days to review discharge per CMS guidelines.  You therefore can see them as a TCM visit and charge appropriately.    Coding guide  02715      -face-to-face within 14 days      -moderate decision complexity (LACE+ score of 28-58)  08261      -face-to-face within 7 days      -high medical decision complexity (LACE+ score 59+)

## 2023-01-05 ENCOUNTER — APPOINTMENT (OUTPATIENT)
Dept: MEDICAL GROUP | Facility: PHYSICIAN GROUP | Age: 83
End: 2023-01-05
Payer: MEDICARE

## 2023-01-09 ENCOUNTER — OFFICE VISIT (OUTPATIENT)
Dept: MEDICAL GROUP | Facility: PHYSICIAN GROUP | Age: 83
End: 2023-01-09
Payer: MEDICARE

## 2023-01-09 ENCOUNTER — PATIENT OUTREACH (OUTPATIENT)
Dept: HEALTH INFORMATION MANAGEMENT | Facility: OTHER | Age: 83
End: 2023-01-09

## 2023-01-09 VITALS
DIASTOLIC BLOOD PRESSURE: 58 MMHG | TEMPERATURE: 98 F | WEIGHT: 172 LBS | HEIGHT: 63 IN | BODY MASS INDEX: 30.48 KG/M2 | OXYGEN SATURATION: 98 % | HEART RATE: 98 BPM | SYSTOLIC BLOOD PRESSURE: 124 MMHG

## 2023-01-09 DIAGNOSIS — S30.1XXD HEMATOMA OF LEFT PSOAS REGION TO ANTICOAGULANT THERAPY, SUBSEQUENT ENCOUNTER: ICD-10-CM

## 2023-01-09 DIAGNOSIS — F33.0 MILD EPISODE OF RECURRENT MAJOR DEPRESSIVE DISORDER (HCC): ICD-10-CM

## 2023-01-09 DIAGNOSIS — I48.19 PERSISTENT ATRIAL FIBRILLATION (HCC): ICD-10-CM

## 2023-01-09 DIAGNOSIS — Z09 HOSPITAL DISCHARGE FOLLOW-UP: Primary | ICD-10-CM

## 2023-01-09 DIAGNOSIS — Z79.899 HIGH RISK MEDICATION USE: ICD-10-CM

## 2023-01-09 DIAGNOSIS — N18.31 STAGE 3A CHRONIC KIDNEY DISEASE: ICD-10-CM

## 2023-01-09 DIAGNOSIS — Z79.01 CHRONIC ANTICOAGULATION: ICD-10-CM

## 2023-01-09 PROCEDURE — 99496 TRANSJ CARE MGMT HIGH F2F 7D: CPT | Performed by: NURSE PRACTITIONER

## 2023-01-09 ASSESSMENT — FIBROSIS 4 INDEX: FIB4 SCORE: 2.86

## 2023-01-09 ASSESSMENT — PATIENT HEALTH QUESTIONNAIRE - PHQ9: CLINICAL INTERPRETATION OF PHQ2 SCORE: 0

## 2023-01-09 NOTE — PROGRESS NOTES
Subjective:     Donte Magaña is a 82 y.o. female who presents for Hospital Follow-up.    POST DISCHARGE CALL:  Discharge Date:2/16/2019   Date of Outreach Call: 2/18/2019  3:31 PM  Now that you're home, how are you doing? Good  Do you have questions about your medications? No  Did you fill your medications? Yes  Do you have a follow-up appointment scheduled?Yes  Discharging Department: * [facility]  Number of Attempts: *  Current or previous attempts completed within two business days of discharge? *  Provided education regarding treatment plan, medication, self-management, ADLs? *  Has patient completed Advance Directive? If yes, advise them to bring to appointment. *Yes on file  Care Manager phone number provided? * no   Is there anything else I can help you with? No    HPI:   Recently hospitalized for Left psoas hematoma secondary to anticoagulant therapy.  Patient was on warfarin for chronic atrial fibrillation. ER labs and urinalysis reassuring.  CT Renal did show  6 x 3.3 x 2.5 cm in size psoas hematoma. Surgery was consulted who recommended not to drain the hematoma at the time. She has cardiology appointment on 1/20/2023. Daughter has spoken with Dr. Irving about stopping warfarin and MD recommended stopping.  Patient continues to hold her warfarin dose. Home health has been ordered for nursing, PT and OT. Patient states that she does her own ADL's. Patient had been sick 2 weeks prior to illness and her appetite was decreased. Tested positive for COVID on 12/29/2022.  She did not require any steroids or antibiotics for her COVID diagnosis.  She is not having any chest pain, shortness of breath, and is complication of headaches.      Current medicines (including reconciliation performed today)  Current Outpatient Medications   Medication Sig Dispense Refill    atenolol (TENORMIN) 50 MG Tab Take 50 mg by mouth every day.      PREBIOTIC PRODUCT PO Take 2 Tablets by mouth every day.  "     solifenacin (VESICARE) 5 MG tablet Take 5 mg by mouth every day.      lisinopril (PRINIVIL) 5 MG Tab Take 1 Tablet by mouth every day. 100 Tablet 0    ipratropium (ATROVENT) 0.03 % Solution Administer 2 Sprays into affected nostril(S) every 12 hours. 30 mL 2    magnesium oxide (MAG-OX) 400 MG Tab tablet Take 1 Tablet by mouth every day. 90 Tablet 3    Mirabegron ER (MYRBETRIQ) 50 MG TABLET SR 24 HR Take 50 mg by mouth every day. 90 Tablet 3    levothyroxine (SYNTHROID) 50 MCG Tab Take 1 Tablet by mouth every morning on an empty stomach. 90 Tablet 3    sertraline (ZOLOFT) 50 MG Tab TAKE ONE TABLET BY MOUTH EVERY DAY 90 Tablet 2    Sennosides (SENOKOT PO) Take 1 Tablet by mouth 1 time a day as needed (constipation).      furosemide (LASIX) 40 MG Tab Take 1 Tablet by mouth every day. 90 Tablet 11    Acetaminophen 500 MG Cap Take 2 Caps by mouth 3 times a day as needed. Indications: Pain      Lutein 20 MG Cap Take 1 Cap by mouth every day. 90 Cap 3    Cholecalciferol (VITAMIN D) 2000 UNIT Tab Take 2,000 Units by mouth every day.       No current facility-administered medications for this visit.       Allergies:   Amiodarone, Bactrim, Cipro xr, Metoprolol, Morphine, Phytoplex z-guard [petrolatum-zinc oxide], Pseudoephedrine, Qvar [beclomethasone dipropionate], Vibramycin, Atorvastatin calcium-polysorbate 80, Augmentin, Diltiazem, Flecainide, Keflex, Mucinex, Tramadol, Atorvastatin, and Tape    Social History     Tobacco Use    Smoking status: Never    Smokeless tobacco: Never   Vaping Use    Vaping Use: Never used   Substance Use Topics    Alcohol use: No    Drug use: No       ROS:  Per HPI    Objective:     Vitals:    01/09/23 1254   BP: 124/58   BP Location: Right arm   Patient Position: Sitting   BP Cuff Size: Adult   Pulse: 98   Temp: 36.7 °C (98 °F)   TempSrc: Temporal   SpO2: 98%   Weight: 78 kg (172 lb)   Height: 1.6 m (5' 3\")     Body mass index is 30.47 kg/m².    Physical Exam:  Gen: Alert and oriented, No " apparent distress.  Daughter present in exam room.  Eyes:   Lids normal. Glasses in place.   Lungs: Normal effort, CTA bilaterally, no wheezes, rhonchi, or rales.    CV: Regular rate and rhythm. No murmurs, rubs, or gallops. Left upper chest pacemaker in place.   Ext: No clubbing, cyanosis, edema      Assessment and Plan:     1. Hospital discharge follow-up  Acute uncomplicated problem.  Hospital discharge follow-up completed today.  She has follow-up scheduled with cardiology.  Home health has been ordered.  Checking updated labs as below.    2. Hematoma of left psoas region to anticoagulant therapy, subsequent encounter  Chronic stable problem.  Check updated labs.  No signs or symptoms of bleeding.  Repeat CT scan for follow-up on left psoas hematoma.  Keep upcoming cardiology appointment.  - CBC WITH DIFFERENTIAL; Future  - Basic Metabolic Panel; Future  - CT-RENAL COLIC EVALUATION(A/P W/O); Future  - Prothrombin Time; Future    3. Persistent atrial fibrillation (HCC)  4. High risk medication use  5. Chronic anticoagulation  Chronic exacerbated problem.  Checking updated INR as she has been off warfarin since 12/30/2022 due to left psoas hematoma.  Keep upcoming cardiology appointment.  Continue with atenolol 50 mg daily.  - Prothrombin Time; Future    6. Mild episode of recurrent major depressive disorder (HCC)  Chronic stable problem.  Continue with sertraline 50 mg daily.    7. Stage 3a chronic kidney disease (HCC)  Chronic stable problem.  Labs have been stable.  Continue to avoid NSAIDs.  On lisinopril.  Checking updated labs.      HCC Gap Form    Diagnosis: N18.31 - Stage 3a chronic kidney disease (HCC)  Assessment and plan: Chronic, stable. Continue with current defined treatment plan: Blood pressure control and lisinopril 5 mg daily.  Recent labs have been stable.  Plan to check updated labs given recent hospitalization. Follow-up at least annually.  Diagnosis: F33.0 - Mild episode of recurrent major  depressive disorder (HCC)  Assessment and plan: Chronic, stable. Continue with current defined treatment plan: Sertraline 50 mg daily.  Denies self-harm or SI today. Follow-up at least annually.  Diagnosis: I48.19 - Persistent atrial fibrillation (HCC)  Assessment and plan: Chronic, stable, as based on today's assessment and impact on other conditions evaluated today. Continue with current treatment plan: Atenolol 50 mg daily and has upcoming 1/20/2023 appointment after her warfarin was stopped due to recent left psoas hematoma.  Follow-up with specialist as directed, but at least annually.  Last edited 01/09/23 13:41 PST by [email or web address]         - Chart and discharge summary were reviewed.   - Hospitalization and results reviewed with patient.   - Medications reviewed including instructions regarding high risk medications, dosing and side effects.  - Recommended Services: Referral to Home Health referral is in place.  - Advance directive/POLST on file?  Yes    Follow-up:Return in about 3 months (around 4/9/2023) for blood pressure, home health,.    Face-to-face transitional care management services with HIGH (today's visit is within days post discharge & LACE+ score 59+) medical decision complexity were provided.     LACE+ Historical Score Over Time (0-28: Low, 29-58: Medium, 59+: High): 78      Educated in proper administration of medication(s) ordered today including safety, possible SE, risks, benefits, rationale and alternatives to therapy.     Please note that this dictation was created using voice recognition software. I have made every reasonable attempt to correct obvious errors, but I expect that there are errors of grammar and possibly content that I did not discover before finalizing the note.

## 2023-01-09 NOTE — PROGRESS NOTES
Called telephone number listed for pt- no longer in service. Called daughter, Reina, no answer. LVM with short introduction to CCM program. Donte qualifies for the program with her HTN, Lupus diagnoses. Gave my name and contact info to return call.

## 2023-01-10 NOTE — PROGRESS NOTES
Reviewed pcp appt today. Pt was ordered HH services today. Will f/u with pt after HH services are completed.

## 2023-01-16 ENCOUNTER — HOSPITAL ENCOUNTER (OUTPATIENT)
Dept: LAB | Facility: MEDICAL CENTER | Age: 83
End: 2023-01-16
Attending: NURSE PRACTITIONER
Payer: MEDICARE

## 2023-01-16 ENCOUNTER — HOSPITAL ENCOUNTER (OUTPATIENT)
Dept: RADIOLOGY | Facility: MEDICAL CENTER | Age: 83
End: 2023-01-16
Attending: NURSE PRACTITIONER
Payer: MEDICARE

## 2023-01-16 DIAGNOSIS — I48.19 PERSISTENT ATRIAL FIBRILLATION (HCC): ICD-10-CM

## 2023-01-16 DIAGNOSIS — S30.1XXD HEMATOMA OF LEFT PSOAS REGION TO ANTICOAGULANT THERAPY, SUBSEQUENT ENCOUNTER: ICD-10-CM

## 2023-01-16 DIAGNOSIS — Z79.01 CHRONIC ANTICOAGULATION: ICD-10-CM

## 2023-01-16 DIAGNOSIS — Z79.899 HIGH RISK MEDICATION USE: ICD-10-CM

## 2023-01-16 LAB
ANION GAP SERPL CALC-SCNC: 13 MMOL/L (ref 7–16)
BASOPHILS # BLD AUTO: 0.6 % (ref 0–1.8)
BASOPHILS # BLD: 0.05 K/UL (ref 0–0.12)
BUN SERPL-MCNC: 11 MG/DL (ref 8–22)
CALCIUM SERPL-MCNC: 10.1 MG/DL (ref 8.4–10.2)
CHLORIDE SERPL-SCNC: 102 MMOL/L (ref 96–112)
CO2 SERPL-SCNC: 25 MMOL/L (ref 20–33)
CREAT SERPL-MCNC: 0.7 MG/DL (ref 0.5–1.4)
EOSINOPHIL # BLD AUTO: 0.5 K/UL (ref 0–0.51)
EOSINOPHIL NFR BLD: 5.7 % (ref 0–6.9)
ERYTHROCYTE [DISTWIDTH] IN BLOOD BY AUTOMATED COUNT: 47.2 FL (ref 35.9–50)
GFR SERPLBLD CREATININE-BSD FMLA CKD-EPI: 85 ML/MIN/1.73 M 2
GLUCOSE SERPL-MCNC: 119 MG/DL (ref 65–99)
HCT VFR BLD AUTO: 48.1 % (ref 37–47)
HGB BLD-MCNC: 16.1 G/DL (ref 12–16)
IMM GRANULOCYTES # BLD AUTO: 0.03 K/UL (ref 0–0.11)
IMM GRANULOCYTES NFR BLD AUTO: 0.3 % (ref 0–0.9)
INR PPP: 0.99 (ref 0.87–1.13)
LYMPHOCYTES # BLD AUTO: 2.08 K/UL (ref 1–4.8)
LYMPHOCYTES NFR BLD: 23.9 % (ref 22–41)
MCH RBC QN AUTO: 31.8 PG (ref 27–33)
MCHC RBC AUTO-ENTMCNC: 33.5 G/DL (ref 33.6–35)
MCV RBC AUTO: 95.1 FL (ref 81.4–97.8)
MONOCYTES # BLD AUTO: 0.75 K/UL (ref 0–0.85)
MONOCYTES NFR BLD AUTO: 8.6 % (ref 0–13.4)
NEUTROPHILS # BLD AUTO: 5.31 K/UL (ref 2–7.15)
NEUTROPHILS NFR BLD: 60.9 % (ref 44–72)
NRBC # BLD AUTO: 0 K/UL
NRBC BLD-RTO: 0 /100 WBC
PLATELET # BLD AUTO: 296 K/UL (ref 164–446)
PMV BLD AUTO: 10.2 FL (ref 9–12.9)
POTASSIUM SERPL-SCNC: 4.4 MMOL/L (ref 3.6–5.5)
PROTHROMBIN TIME: 13 SEC (ref 12–14.6)
RBC # BLD AUTO: 5.06 M/UL (ref 4.2–5.4)
SODIUM SERPL-SCNC: 140 MMOL/L (ref 135–145)
WBC # BLD AUTO: 8.7 K/UL (ref 4.8–10.8)

## 2023-01-16 PROCEDURE — 85025 COMPLETE CBC W/AUTO DIFF WBC: CPT

## 2023-01-16 PROCEDURE — 85610 PROTHROMBIN TIME: CPT

## 2023-01-16 PROCEDURE — 80048 BASIC METABOLIC PNL TOTAL CA: CPT

## 2023-01-16 PROCEDURE — 74176 CT ABD & PELVIS W/O CONTRAST: CPT

## 2023-01-16 PROCEDURE — 36415 COLL VENOUS BLD VENIPUNCTURE: CPT

## 2023-01-18 ENCOUNTER — ANTICOAGULATION MONITORING (OUTPATIENT)
Dept: VASCULAR LAB | Facility: MEDICAL CENTER | Age: 83
End: 2023-01-18
Payer: MEDICARE

## 2023-01-18 DIAGNOSIS — Z79.01 CHRONIC ANTICOAGULATION: ICD-10-CM

## 2023-01-18 NOTE — PROGRESS NOTES
Per chart review, anticoagulation discontinued at recent hospital admission.  Pending further contact, will discharge from anticoagulation clinic.  Vladimir Taylor, PharmD, BCACP

## 2023-01-20 ENCOUNTER — OFFICE VISIT (OUTPATIENT)
Dept: CARDIOLOGY | Facility: MEDICAL CENTER | Age: 83
End: 2023-01-20
Payer: MEDICARE

## 2023-01-20 ENCOUNTER — DEVICE CHECK (OUTPATIENT)
Dept: CARDIOLOGY | Facility: MEDICAL CENTER | Age: 83
End: 2023-01-20

## 2023-01-20 VITALS
OXYGEN SATURATION: 96 % | RESPIRATION RATE: 16 BRPM | HEIGHT: 63 IN | DIASTOLIC BLOOD PRESSURE: 60 MMHG | HEART RATE: 90 BPM | BODY MASS INDEX: 31.01 KG/M2 | WEIGHT: 175 LBS | SYSTOLIC BLOOD PRESSURE: 120 MMHG

## 2023-01-20 DIAGNOSIS — I10 ESSENTIAL HYPERTENSION: ICD-10-CM

## 2023-01-20 DIAGNOSIS — I35.1 AORTIC VALVE INSUFFICIENCY, ETIOLOGY OF CARDIAC VALVE DISEASE UNSPECIFIED: ICD-10-CM

## 2023-01-20 DIAGNOSIS — E66.09 CLASS 1 OBESITY DUE TO EXCESS CALORIES WITH SERIOUS COMORBIDITY AND BODY MASS INDEX (BMI) OF 31.0 TO 31.9 IN ADULT: ICD-10-CM

## 2023-01-20 DIAGNOSIS — R60.0 BILATERAL LEG EDEMA: ICD-10-CM

## 2023-01-20 DIAGNOSIS — Z95.0 CARDIAC PACEMAKER IN SITU: ICD-10-CM

## 2023-01-20 DIAGNOSIS — I73.9 PVD (PERIPHERAL VASCULAR DISEASE) (HCC): ICD-10-CM

## 2023-01-20 DIAGNOSIS — I48.19 PERSISTENT ATRIAL FIBRILLATION (HCC): ICD-10-CM

## 2023-01-20 DIAGNOSIS — N18.31 STAGE 3A CHRONIC KIDNEY DISEASE: ICD-10-CM

## 2023-01-20 DIAGNOSIS — I25.10 CORONARY ARTERY DISEASE INVOLVING NATIVE CORONARY ARTERY OF NATIVE HEART WITHOUT ANGINA PECTORIS: ICD-10-CM

## 2023-01-20 DIAGNOSIS — Z79.01 CHRONIC ANTICOAGULATION: ICD-10-CM

## 2023-01-20 PROBLEM — R60.9 PERIPHERAL EDEMA: Status: RESOLVED | Noted: 2019-02-13 | Resolved: 2023-01-20

## 2023-01-20 PROBLEM — E66.811 CLASS 1 OBESITY DUE TO EXCESS CALORIES WITH SERIOUS COMORBIDITY AND BODY MASS INDEX (BMI) OF 31.0 TO 31.9 IN ADULT: Status: ACTIVE | Noted: 2020-03-02

## 2023-01-20 PROBLEM — S30.1XXA PSOAS HEMATOMA, LEFT, SECONDARY TO ANTICOAGULANT THERAPY: Status: RESOLVED | Noted: 2022-12-28 | Resolved: 2023-01-20

## 2023-01-20 PROBLEM — Z02.89 ENCOUNTER FOR COMPLETION OF FORM WITH PATIENT: Status: RESOLVED | Noted: 2022-10-24 | Resolved: 2023-01-20

## 2023-01-20 PROBLEM — U07.1 COVID-19: Status: RESOLVED | Noted: 2022-12-30 | Resolved: 2023-01-20

## 2023-01-20 PROCEDURE — 99214 OFFICE O/P EST MOD 30 MIN: CPT | Performed by: NURSE PRACTITIONER

## 2023-01-20 RX ORDER — FUROSEMIDE 40 MG/1
40 TABLET ORAL
Qty: 100 TABLET | Refills: 3 | Status: SHIPPED | OUTPATIENT
Start: 2023-01-20 | End: 2023-01-20

## 2023-01-20 RX ORDER — MAGNESIUM OXIDE TAB 400 MG (241.3 MG ELEMENTAL MG) 400 (241.3 MG) MG
400 TAB ORAL
COMMUNITY
Start: 2022-12-14 | End: 2023-01-20

## 2023-01-20 RX ORDER — FUROSEMIDE 40 MG/1
40 TABLET ORAL 2 TIMES DAILY
Qty: 200 TABLET | Refills: 3 | Status: SHIPPED | OUTPATIENT
Start: 2023-01-20

## 2023-01-20 RX ORDER — ATENOLOL 50 MG/1
50 TABLET ORAL EVERY EVENING
Qty: 100 TABLET | Refills: 3 | Status: SHIPPED | OUTPATIENT
Start: 2023-01-20

## 2023-01-20 RX ORDER — LISINOPRIL 5 MG/1
5 TABLET ORAL
Qty: 100 TABLET | Refills: 3 | Status: SHIPPED | OUTPATIENT
Start: 2023-01-20 | End: 2024-01-31

## 2023-01-20 ASSESSMENT — ENCOUNTER SYMPTOMS
PND: 0
ORTHOPNEA: 0
CLAUDICATION: 0
PALPITATIONS: 0
ABDOMINAL PAIN: 0
DIZZINESS: 0
MYALGIAS: 0
FEVER: 0
SHORTNESS OF BREATH: 0
COUGH: 0
BACK PAIN: 1

## 2023-01-20 ASSESSMENT — FIBROSIS 4 INDEX: FIB4 SCORE: 2.05

## 2023-01-20 NOTE — PATIENT INSTRUCTIONS
Start blood thinner called eliquis 2.5 mg twice a day.    Let me know if you feel concerns on this or want to switch back to warfarin.    Concerning symptoms:  Bleeding in stool or urine  Worsening left back pain or flank pain or swelling     I have refilled all your heart medications.    Pacer check in 3 months remote; 1 year with follow up with echo.

## 2023-01-20 NOTE — PROGRESS NOTES
"Subjective:   Donte Magaña is a 82 y.o. female who presents today for follow up on recent hospitalization and annual appointment.    She is a prior patient of Dr. Richards in our office.    Hx of HLD with CAD (medical management only), chronic afib on chronic anticoagulation, HTN, hypothyroid, lupus, obesity, PPM, moderate MR, and LE edema.    She is overall feeling okay but her feet continue to swell. This is chronic for her but stable.    During her hospital stay, she was found to have a psoas hematoma on left side, repeat CT shows reduction.    She is now at [facility].    No other cardiac complaints. She hasn't had her PPM checked in sometime.    She has had no episodes of chest pain, palpitations, dizziness/lightheadedness, shortness of breath, or orthopnea.    Past Medical History:   Diagnosis Date    A-fib (HCC)     Anesthesia     \"Tachycardia for 5 days after cataract surgery\"    Anticoagulant long-term use 1/12/2012    Arthritis     osteo-Knees, hips    Atrial fibrillation (HCC)     Backpain     R hip    Bowel habit changes     diarrhea    Breath shortness     with exertion, prn O2 2L    Bronchitis Nov, 2013    CAD (coronary artery disease)     Depression     Glaucoma 5/3/2011    Hematoma complicating a procedure 11/3/2012    Hemorrhagic disorder (HCC)     bruising/coumadin    Hypertension     Hypothyroid     Lupus (HCC)     Macular degeneration     Menopause 1/12/2012    Mitral regurgitation 10/30/2012    Obesity 1/12/2012    Pacemaker 2018    Pneumonia feb,2013    Pre-syncope 6/29/2018    Pulmonary hypertension (HCC) 10/30/2012    PVC's (premature ventricular contractions) 1/12/2012    Senile nuclear sclerosis     Spinal stenosis of lumbar region at multiple levels     Unspecified cataract     repaired bilateral    Unspecified urinary incontinence     Urinary bladder disorder      Past Surgical History:   Procedure Laterality Date    MT COMBINED ANT/POST COLPORRHAPHY  " 1/14/2020    Procedure: COLPORRHAPHY, COMBINED ANTEROPOSTERIOR - PERINEOPLASTY;  Surgeon: Waqas Robin M.D.;  Location: SURGERY SAME DAY [facility];  Service: Gynecology    WV LAP,DIAGNOSTIC ABDOMEN  1/14/2020    Procedure: PELVISCOPY;  Surgeon: Waqas Robin M.D.;  Location: SURGERY SAME DAY [facility];  Service: Gynecology    ENTEROCELE REPAIR  1/14/2020    Procedure: REPAIR, ENTEROCELE;  Surgeon: Waqas Robin M.D.;  Location: SURGERY SAME DAY [facility];  Service: Gynecology    BLADDER SLING FEMALE  1/14/2020    Procedure: BLADDER SLING, FEMALE - TOT;  Surgeon: Waqas Robin M.D.;  Location: SURGERY SAME DAY [facility];  Service: Gynecology    VAGINAL SUSPENSION  1/14/2020    Procedure: COLPOPEXY - SACROSPINOUS VAULT SUSPENSION;  Surgeon: Waqas Robin M.D.;  Location: SURGERY SAME DAY [facility];  Service: Gynecology    SALPINGO OOPHORECTOMY Bilateral 1/14/2020    Procedure: SALPINGO-OOPHORECTOMY;  Surgeon: Waqas Robin M.D.;  Location: SURGERY SAME DAY [facility];  Service: Gynecology    IRRIGATION & DEBRIDEMENT ORTHO Right 2/14/2019    Procedure: IRRIGATION & DEBRIDEMENT ORTHO-HIP WOUND ;  Surgeon: Vladimir Lee M.D.;  Location: [facility];  Service: Orthopedics    HIP ARTH ANTERIOR TOTAL Right 1/17/2019    Procedure: HIP ARTHROPLASTY ANTERIOR TOTAL;  Surgeon: Juan C Mercedes M.D.;  Location: [facility];  Service: Orthopedics    PACEMAKER INSERTION  06/30/2018    Dual Chamber    KNEE ARTHROPLASTY TOTAL Right 6/23/2016    Procedure: KNEE ARTHROPLASTY TOTAL;  Surgeon: Heriberto Lozada M.D.;  Location: [facility];  Service:     KNEE ARTHROPLASTY TOTAL Left 5/28/2015    Procedure: KNEE ARTHROPLASTY TOTAL;  Surgeon: Heriberto Lozada M.D.;  Location: [facility];  Service:     CATARACT PHACO WITH IOL Right 5/5/2015    Procedure: IOL OD - STANDARD;  Surgeon: Dmitry Bejarano M.D.;  Location: SURGERY [facility];  Service:      "CATARACT PHACO WITH IOL  4/21/2015    Performed by Dmitry Bejarano M.D. at [facility]ERY [facility]    RECOVERY  11/30/2010    Performed by SURGERY, CATH-RECOVERY at SURGERY SAME DAY [facility]    COLONOSCOPY  2008    Normal    [facility]    ABDOMINAL HYSTERECTOMY TOTAL  April 15,1975    still has ovaries    OPEN REDUCTION      left ankle    OTHER      Removed pins from left ankle    OTHER CARDIAC SURGERY  12/2017 and 07/19/2018     Cardiac Ablation    TONSILLECTOMY AND ADENOIDECTOMY       Family History   Problem Relation Age of Onset    Stroke Mother     Diabetes Father     Stroke Sister     Heart Disease Brother     Stroke Sister     GI Disease Daughter         Crohn's Disease    Heart Disease Daughter         CHF    Other Daughter         Chronic Pain--Lymphedema    Cancer Paternal Aunt         breast     Social History     Tobacco Use   Smoking Status Never   Smokeless Tobacco Never     Allergies   Allergen Reactions    Amiodarone Hives     Throat and tongue itching    Bactrim Shortness of Breath    Cipro Xr Swelling    Metoprolol Swelling     Causes throat swelling    Morphine Unspecified     Hallucinations    Phytoplex Z-Guard [Petrolatum-Zinc Oxide] Unspecified     \"causes burning\"    Pseudoephedrine Palpitations    Qvar [Beclomethasone Dipropionate] Unspecified     Pressure on heart      Vibramycin Shortness of Breath    Atorvastatin Calcium-Polysorbate 80 Unspecified     Muscle aches      Augmentin Unspecified     Unknown reaction    Diltiazem Rash     rash    Flecainide Unspecified     dizziness    Keflex Unspecified     Pt states \"Unsure\".    Mucinex Unspecified     GI Distress      Tramadol Unspecified     crying    Atorvastatin Myalgia    Tape Rash     Paper tape okay     Outpatient Encounter Medications as of 1/20/2023   Medication Sig Dispense Refill    lisinopril (PRINIVIL) 5 MG Tab Take 1 Tablet by mouth every day. 100 Tablet 3    atenolol (TENORMIN) 50 MG Tab Take 1 Tablet by mouth every " evening. 100 Tablet 3    apixaban (ELIQUIS) 2.5mg Tab Take 1 Tablet by mouth 2 times a day. 60 Tablet 11    furosemide (LASIX) 40 MG Tab Take 1 Tablet by mouth 2 times a day. 200 Tablet 3    PREBIOTIC PRODUCT PO Take 2 Tablets by mouth every day.      solifenacin (VESICARE) 5 MG tablet Take 5 mg by mouth every day.      ipratropium (ATROVENT) 0.03 % Solution Administer 2 Sprays into affected nostril(S) every 12 hours. 30 mL 2    magnesium oxide (MAG-OX) 400 MG Tab tablet Take 1 Tablet by mouth every day. 90 Tablet 3    Mirabegron ER (MYRBETRIQ) 50 MG TABLET SR 24 HR Take 50 mg by mouth every day. 90 Tablet 3    levothyroxine (SYNTHROID) 50 MCG Tab Take 1 Tablet by mouth every morning on an empty stomach. 90 Tablet 3    sertraline (ZOLOFT) 50 MG Tab TAKE ONE TABLET BY MOUTH EVERY DAY 90 Tablet 2    Sennosides (SENOKOT PO) Take 1 Tablet by mouth 1 time a day as needed (constipation).      Acetaminophen 500 MG Cap Take 2 Caps by mouth 3 times a day as needed. Indications: Pain      Lutein 20 MG Cap Take 1 Cap by mouth every day. 90 Cap 3    Cholecalciferol (VITAMIN D) 2000 UNIT Tab Take 2,000 Units by mouth every day.      [DISCONTINUED] MAGNESIUM-OXIDE 400 (240 Mg) MG Tab Take 400 mg by mouth every day. (Patient not taking: Reported on 1/20/2023)      [DISCONTINUED] furosemide (LASIX) 40 MG Tab Take 1 Tablet by mouth 1 time a day as needed (leg swelling). 100 Tablet 3    [DISCONTINUED] atenolol (TENORMIN) 50 MG Tab Take 50 mg by mouth every day.      [DISCONTINUED] lisinopril (PRINIVIL) 5 MG Tab Take 1 Tablet by mouth every day. 100 Tablet 0    [DISCONTINUED] furosemide (LASIX) 40 MG Tab Take 1 Tablet by mouth every day. 90 Tablet 11     No facility-administered encounter medications on file as of 1/20/2023.     Review of Systems   Constitutional:  Negative for fever and malaise/fatigue.   Respiratory:  Negative for cough and shortness of breath.    Cardiovascular:  Negative for chest pain, palpitations, orthopnea,  "claudication, leg swelling and PND.   Gastrointestinal:  Negative for abdominal pain.   Musculoskeletal:  Positive for back pain. Negative for myalgias.        One remote event   Neurological:  Negative for dizziness.      Objective:   /60 (BP Location: Left arm, Patient Position: Sitting, BP Cuff Size: Adult)   Pulse 90   Resp 16   Ht 1.6 m (5' 3\")   Wt 79.4 kg (175 lb)   LMP 01/01/1993   SpO2 96%   BMI 31.00 kg/m²     Physical Exam  Vitals and nursing note reviewed.   Constitutional:       General: She is not in acute distress.     Appearance: Normal appearance. She is well-developed and normal weight.   HENT:      Head: Normocephalic and atraumatic.   Neck:      Vascular: No JVD.   Cardiovascular:      Rate and Rhythm: Normal rate. Rhythm irregular.      Pulses:           Dorsalis pedis pulses are 1+ on the right side and 1+ on the left side.      Heart sounds: Normal heart sounds. No murmur heard.     Comments: Pacemaker present in left upper chest without evidence   of erosion, drainage,or erythema.    Pulmonary:      Effort: Pulmonary effort is normal. No respiratory distress.      Breath sounds: Normal breath sounds.   Abdominal:      General: Bowel sounds are normal.      Palpations: Abdomen is soft.   Musculoskeletal:      Cervical back: Normal range of motion.      Right lower leg: Edema present.      Left lower leg: Edema present.      Comments: Walker for stability and balance   Skin:     General: Skin is warm and dry.      Capillary Refill: Capillary refill takes less than 2 seconds.   Neurological:      General: No focal deficit present.      Mental Status: She is alert and oriented to person, place, and time. Mental status is at baseline.   Psychiatric:         Attention and Perception: She perceives visual hallucinations.         Mood and Affect: Mood normal.         Behavior: Behavior normal.         Thought Content: Thought content normal.         Judgment: Judgment normal.      " "Comments: \"Seeing bugs on the floor\"       Assessment:     1. Persistent atrial fibrillation (HCC)        2. Bilateral leg edema        3. Cardiac pacemaker in situ  furosemide (LASIX) 40 MG Tab    DISCONTINUED: furosemide (LASIX) 40 MG Tab      4. Class 1 obesity due to excess calories with serious comorbidity and body mass index (BMI) of 31.0 to 31.9 in adult        5. Essential hypertension  lisinopril (PRINIVIL) 5 MG Tab      6. Chronic anticoagulation  furosemide (LASIX) 40 MG Tab    DISCONTINUED: furosemide (LASIX) 40 MG Tab      7. Coronary artery disease involving native coronary artery of native heart without angina pectoris  furosemide (LASIX) 40 MG Tab    DISCONTINUED: furosemide (LASIX) 40 MG Tab      8. PVD (peripheral vascular disease) ([facility])        9. Stage 3a chronic kidney disease (HCC)  furosemide (LASIX) 40 MG Tab    DISCONTINUED: furosemide (LASIX) 40 MG Tab      10. Pressure injury of buttock, stage 3, unspecified laterality ([facility])  furosemide (LASIX) 40 MG Tab    DISCONTINUED: furosemide (LASIX) 40 MG Tab      11. Aortic valve insufficiency, etiology of cardiac valve disease unspecified  EC-ECHOCARDIOGRAM COMPLETE W/O CONT        Medical Decision Making:  Today's Assessment / Status / Plan:     1. Edema  -stable on lasix 40 mg QD, occasional need to BID dosing  -labs stable  -recommend dietary K for BID lasix  -recommend daily leg elevation alongside compression stockings, and reduce Na in diet    2. HTN  -good control on  lisinopril, lasix, and atenolol  -BP goal <130/80    3. Chronic Afib on chronic anticoagulation with PPM check  -chronic rate controlled and asymptomatic  -follow PPM check  -OK to start back on OAC with reduction in hematoma sizing  -start eliquis 2.5 mg BID  -cont atenolol    4. HLD with CAD  -,treat medically, intolerance to statin's  -follow clinically, last nuclear imaging in '16 negative for ischemia    5. CKD, PVD  -all stable  -follow labs and symptoms    Patient is to " follow up 6 months with PPM check;  then 1 year with PPM check and follow up with myself.

## 2023-02-03 ENCOUNTER — PATIENT MESSAGE (OUTPATIENT)
Dept: MEDICAL GROUP | Facility: PHYSICIAN GROUP | Age: 83
End: 2023-02-03
Payer: MEDICARE

## 2023-02-03 DIAGNOSIS — F32.0 CURRENT MILD EPISODE OF MAJOR DEPRESSIVE DISORDER WITHOUT PRIOR EPISODE (HCC): ICD-10-CM

## 2023-02-03 DIAGNOSIS — Z12.83 SKIN CANCER SCREENING: ICD-10-CM

## 2023-02-06 NOTE — TELEPHONE ENCOUNTER
Requested Prescriptions     Signed Prescriptions Disp Refills    sertraline (ZOLOFT) 50 MG Tab 90 Tablet 3     Sig: Take 1 Tablet by mouth every day.     Authorizing Provider: NORBERT PARADA A.P.R.N.

## 2023-02-16 ENCOUNTER — PATIENT MESSAGE (OUTPATIENT)
Dept: CARDIOLOGY | Facility: MEDICAL CENTER | Age: 83
End: 2023-02-16
Payer: MEDICARE

## 2023-02-17 NOTE — TELEPHONE ENCOUNTER
BN- Please advise in SC absence. Patient was prescribed Eliquis, needed an alternative due to causing inching.

## 2023-02-17 NOTE — PROGRESS NOTES
Next option would be rivaroxaban 20 mg taken daily with dinner.  This does need to be eaten with food.  Prescription has been sent in.

## 2023-03-07 ENCOUNTER — APPOINTMENT (RX ONLY)
Dept: URBAN - METROPOLITAN AREA CLINIC 6 | Facility: CLINIC | Age: 83
Setting detail: DERMATOLOGY
End: 2023-03-07

## 2023-03-07 DIAGNOSIS — L81.4 OTHER MELANIN HYPERPIGMENTATION: ICD-10-CM

## 2023-03-07 DIAGNOSIS — Z85.828 PERSONAL HISTORY OF OTHER MALIGNANT NEOPLASM OF SKIN: ICD-10-CM

## 2023-03-07 DIAGNOSIS — D22 MELANOCYTIC NEVI: ICD-10-CM

## 2023-03-07 DIAGNOSIS — Z71.89 OTHER SPECIFIED COUNSELING: ICD-10-CM

## 2023-03-07 DIAGNOSIS — F42.4 EXCORIATION (SKIN-PICKING) DISORDER: ICD-10-CM

## 2023-03-07 DIAGNOSIS — D18.0 HEMANGIOMA: ICD-10-CM

## 2023-03-07 DIAGNOSIS — L21.8 OTHER SEBORRHEIC DERMATITIS: ICD-10-CM

## 2023-03-07 DIAGNOSIS — L57.8 OTHER SKIN CHANGES DUE TO CHRONIC EXPOSURE TO NONIONIZING RADIATION: ICD-10-CM

## 2023-03-07 DIAGNOSIS — L82.1 OTHER SEBORRHEIC KERATOSIS: ICD-10-CM

## 2023-03-07 DIAGNOSIS — L57.0 ACTINIC KERATOSIS: ICD-10-CM

## 2023-03-07 PROBLEM — D18.01 HEMANGIOMA OF SKIN AND SUBCUTANEOUS TISSUE: Status: ACTIVE | Noted: 2023-03-07

## 2023-03-07 PROBLEM — D22.71 MELANOCYTIC NEVI OF RIGHT LOWER LIMB, INCLUDING HIP: Status: ACTIVE | Noted: 2023-03-07

## 2023-03-07 PROBLEM — D22.61 MELANOCYTIC NEVI OF RIGHT UPPER LIMB, INCLUDING SHOULDER: Status: ACTIVE | Noted: 2023-03-07

## 2023-03-07 PROBLEM — D22.62 MELANOCYTIC NEVI OF LEFT UPPER LIMB, INCLUDING SHOULDER: Status: ACTIVE | Noted: 2023-03-07

## 2023-03-07 PROBLEM — D22.72 MELANOCYTIC NEVI OF LEFT LOWER LIMB, INCLUDING HIP: Status: ACTIVE | Noted: 2023-03-07

## 2023-03-07 PROBLEM — D22.5 MELANOCYTIC NEVI OF TRUNK: Status: ACTIVE | Noted: 2023-03-07

## 2023-03-07 PROCEDURE — ? PHOTODYNAMIC THERAPY COUNSELING

## 2023-03-07 PROCEDURE — ? DIAGNOSIS COMMENT

## 2023-03-07 PROCEDURE — 17000 DESTRUCT PREMALG LESION: CPT

## 2023-03-07 PROCEDURE — ? COUNSELING

## 2023-03-07 PROCEDURE — 99214 OFFICE O/P EST MOD 30 MIN: CPT | Mod: 25

## 2023-03-07 PROCEDURE — ? PRESCRIPTION

## 2023-03-07 PROCEDURE — 17003 DESTRUCT PREMALG LES 2-14: CPT

## 2023-03-07 PROCEDURE — ? LIQUID NITROGEN

## 2023-03-07 RX ORDER — KETOCONAZOLE 20 MG/ML
SHAMPOO, SUSPENSION TOPICAL
Qty: 120 | Refills: 3 | Status: ERX | COMMUNITY
Start: 2023-03-07

## 2023-03-07 RX ADMIN — KETOCONAZOLE: 20 SHAMPOO, SUSPENSION TOPICAL at 00:00

## 2023-03-07 ASSESSMENT — LOCATION SIMPLE DESCRIPTION DERM
LOCATION SIMPLE: LEFT PRETIBIAL REGION
LOCATION SIMPLE: RIGHT CHEEK
LOCATION SIMPLE: LEFT UPPER ARM
LOCATION SIMPLE: RIGHT THIGH
LOCATION SIMPLE: ANTERIOR SCALP
LOCATION SIMPLE: RIGHT PRETIBIAL REGION
LOCATION SIMPLE: RIGHT KNEE
LOCATION SIMPLE: ABDOMEN
LOCATION SIMPLE: LEFT KNEE
LOCATION SIMPLE: LEFT FOREARM
LOCATION SIMPLE: RIGHT UPPER ARM
LOCATION SIMPLE: RIGHT EYEBROW
LOCATION SIMPLE: RIGHT FOREARM
LOCATION SIMPLE: CHEST
LOCATION SIMPLE: LEFT THIGH
LOCATION SIMPLE: INFERIOR FOREHEAD

## 2023-03-07 ASSESSMENT — LOCATION DETAILED DESCRIPTION DERM
LOCATION DETAILED: RIGHT ANTERIOR DISTAL THIGH
LOCATION DETAILED: RIGHT ANTECUBITAL SKIN
LOCATION DETAILED: RIGHT LATERAL EYEBROW
LOCATION DETAILED: LEFT ANTERIOR DISTAL UPPER ARM
LOCATION DETAILED: RIGHT POSTERIOR LATERAL PROXIMAL THIGH
LOCATION DETAILED: RIGHT CENTRAL MALAR CHEEK
LOCATION DETAILED: MID-FRONTAL SCALP
LOCATION DETAILED: LEFT ANTERIOR PROXIMAL UPPER ARM
LOCATION DETAILED: INFERIOR MID FOREHEAD
LOCATION DETAILED: EPIGASTRIC SKIN
LOCATION DETAILED: PERIUMBILICAL SKIN
LOCATION DETAILED: LEFT VENTRAL PROXIMAL FOREARM
LOCATION DETAILED: LEFT ANTERIOR DISTAL THIGH
LOCATION DETAILED: RIGHT KNEE
LOCATION DETAILED: RIGHT INFERIOR CENTRAL MALAR CHEEK
LOCATION DETAILED: RIGHT PROXIMAL PRETIBIAL REGION
LOCATION DETAILED: RIGHT ANTERIOR DISTAL UPPER ARM
LOCATION DETAILED: LEFT KNEE
LOCATION DETAILED: RIGHT VENTRAL PROXIMAL FOREARM
LOCATION DETAILED: LEFT PROXIMAL PRETIBIAL REGION
LOCATION DETAILED: RIGHT ANTERIOR PROXIMAL UPPER ARM
LOCATION DETAILED: LOWER STERNUM
LOCATION DETAILED: RIGHT MEDIAL MALAR CHEEK

## 2023-03-07 ASSESSMENT — LOCATION ZONE DERM
LOCATION ZONE: TRUNK
LOCATION ZONE: LEG
LOCATION ZONE: ARM
LOCATION ZONE: FACE
LOCATION ZONE: SCALP

## 2023-03-07 NOTE — PROCEDURE: DIAGNOSIS COMMENT
Comment: No hx of cold sores. Patient’s daughter will contact clinic if they are interested in having PDT performed int he future.
Detail Level: Simple
Render Risk Assessment In Note?: no
Comment: [id number] C treated by Mohs 5/2022.
Comment: [id number] B. Treated by biopsy, appears resolved at today’s visit.

## 2023-03-07 NOTE — PROCEDURE: LIQUID NITROGEN
Application Tool (Optional): Liquid Nitrogen Sprayer
Consent: The patient's consent was obtained including but not limited to risks of crusting, scabbing, blistering, scarring, darker or lighter pigmentary change, recurrence, incomplete removal and infection.
Show Aperture Variable?: Yes
Render Post-Care Instructions In Note?: no
Detail Level: Detailed
Duration Of Freeze Thaw-Cycle (Seconds): 10
Post-Care Instructions: I reviewed with the patient in detail post-care instructions. Patient is to wear sunprotection, and avoid picking at any of the treated lesions. Pt may apply Vaseline to crusted or scabbing areas.
Number Of Freeze-Thaw Cycles: 3 freeze-thaw cycles

## 2023-03-30 ENCOUNTER — PATIENT MESSAGE (OUTPATIENT)
Dept: CARDIOLOGY | Facility: MEDICAL CENTER | Age: 83
End: 2023-03-30
Payer: MEDICARE

## 2023-04-06 ENCOUNTER — APPOINTMENT (OUTPATIENT)
Dept: CARDIOLOGY | Facility: MEDICAL CENTER | Age: 83
End: 2023-04-06
Attending: NURSE PRACTITIONER
Payer: MEDICARE

## 2023-04-10 ENCOUNTER — OFFICE VISIT (OUTPATIENT)
Dept: MEDICAL GROUP | Facility: PHYSICIAN GROUP | Age: 83
End: 2023-04-10
Payer: MEDICARE

## 2023-04-10 VITALS
TEMPERATURE: 97.9 F | DIASTOLIC BLOOD PRESSURE: 58 MMHG | OXYGEN SATURATION: 96 % | BODY MASS INDEX: 30.22 KG/M2 | SYSTOLIC BLOOD PRESSURE: 124 MMHG | HEART RATE: 89 BPM | HEIGHT: 64 IN | WEIGHT: 177 LBS

## 2023-04-10 DIAGNOSIS — I48.19 PERSISTENT ATRIAL FIBRILLATION (HCC): ICD-10-CM

## 2023-04-10 DIAGNOSIS — N18.31 STAGE 3A CHRONIC KIDNEY DISEASE: ICD-10-CM

## 2023-04-10 DIAGNOSIS — Z13.820 ENCOUNTER FOR OSTEOPOROSIS SCREENING IN ASYMPTOMATIC POSTMENOPAUSAL PATIENT: ICD-10-CM

## 2023-04-10 DIAGNOSIS — Z13.220 SCREENING FOR LIPID DISORDERS: ICD-10-CM

## 2023-04-10 DIAGNOSIS — E03.9 ACQUIRED HYPOTHYROIDISM: ICD-10-CM

## 2023-04-10 DIAGNOSIS — I10 ESSENTIAL HYPERTENSION: ICD-10-CM

## 2023-04-10 DIAGNOSIS — Z78.0 ENCOUNTER FOR OSTEOPOROSIS SCREENING IN ASYMPTOMATIC POSTMENOPAUSAL PATIENT: ICD-10-CM

## 2023-04-10 PROCEDURE — 99214 OFFICE O/P EST MOD 30 MIN: CPT | Performed by: NURSE PRACTITIONER

## 2023-04-10 RX ORDER — KETOCONAZOLE 20 MG/ML
SHAMPOO TOPICAL
COMMUNITY
Start: 2023-03-07 | End: 2023-04-10

## 2023-04-10 ASSESSMENT — FIBROSIS 4 INDEX: FIB4 SCORE: 2.05

## 2023-04-10 NOTE — ASSESSMENT & PLAN NOTE
Her blood pressure today is 124/58.  She continues with lisinopril 5 mg daily, atenolol 50 mg every evening and furosemide 40 mg daily up to twice a day dosing. She does not check her blood pressure. Denies chest pain, shortness of breath, dizziness, blurry vision or headache. She is now more active at her assisted living. Her leg swelling has improved with her increased activity.

## 2023-04-10 NOTE — PROGRESS NOTES
"Subjective:     CC: essential hypertension    HPI:   Donte presents today with her daughter for the following:    Essential hypertension  Her blood pressure today is 124/58.  She continues with lisinopril 5 mg daily, atenolol 50 mg every evening and furosemide 40 mg daily up to twice a day dosing. She does not check her blood pressure. Denies chest pain, shortness of breath, dizziness, blurry vision or headache. She is now more active at her assisted living. Her leg swelling has improved with her increased activity.    Persistent atrial fibrillation (HCC)  She is followed by cardiology. She is on Xarelto 20 mg with evening meal.  She has not noticed any hematuria, bloody stools or bloody noses.  She has been seen at dermatology for skin cancer screening.  Continues to have itching that improves with lotion.  She has scratched a scab as well.        Past Medical History:   Diagnosis Date    A-fib (HCC)     Anesthesia     \"Tachycardia for 5 days after cataract surgery\"    Anticoagulant long-term use 1/12/2012    Arthritis     osteo-Knees, hips    Atrial fibrillation (HCC)     Backpain     R hip    Bowel habit changes     diarrhea    Breath shortness     with exertion, prn O2 2L    Bronchitis Nov, 2013    CAD (coronary artery disease)     Depression     Glaucoma 5/3/2011    Hematoma complicating a procedure 11/3/2012    Hemorrhagic disorder (HCC)     bruising/coumadin    Hypertension     Hypothyroid     Lupus (HCC)     Macular degeneration     Menopause 1/12/2012    Mitral regurgitation 10/30/2012    Obesity 1/12/2012    Pacemaker 2018    Pneumonia feb,2013    Pre-syncope 6/29/2018    Pulmonary hypertension (HCC) 10/30/2012    PVC's (premature ventricular contractions) 1/12/2012    Senile nuclear sclerosis     Spinal stenosis of lumbar region at multiple levels     Unspecified cataract     repaired bilateral    Unspecified urinary incontinence     Urinary bladder disorder        Social History     Tobacco Use    Smoking " status: Never    Smokeless tobacco: Never   Vaping Use    Vaping Use: Never used   Substance Use Topics    Alcohol use: No    Drug use: No       Current Outpatient Medications Ordered in Epic   Medication Sig Dispense Refill    rivaroxaban (XARELTO) 20 MG Tab tablet Take 1 Tablet by mouth with dinner. 90 Tablet 3    sertraline (ZOLOFT) 50 MG Tab Take 1 Tablet by mouth every day. 90 Tablet 3    lisinopril (PRINIVIL) 5 MG Tab Take 1 Tablet by mouth every day. 100 Tablet 3    atenolol (TENORMIN) 50 MG Tab Take 1 Tablet by mouth every evening. 100 Tablet 3    furosemide (LASIX) 40 MG Tab Take 1 Tablet by mouth 2 times a day. 200 Tablet 3    PREBIOTIC PRODUCT PO Take 2 Tablets by mouth every day.      ipratropium (ATROVENT) 0.03 % Solution Administer 2 Sprays into affected nostril(S) every 12 hours. 30 mL 2    magnesium oxide (MAG-OX) 400 MG Tab tablet Take 1 Tablet by mouth every day. 90 Tablet 3    Mirabegron ER (MYRBETRIQ) 50 MG TABLET SR 24 HR Take 50 mg by mouth every day. 90 Tablet 3    levothyroxine (SYNTHROID) 50 MCG Tab Take 1 Tablet by mouth every morning on an empty stomach. 90 Tablet 3    Sennosides (SENOKOT PO) Take 1 Tablet by mouth 1 time a day as needed (constipation).      Acetaminophen 500 MG Cap Take 2 Caps by mouth 3 times a day as needed. Indications: Pain      Lutein 20 MG Cap Take 1 Cap by mouth every day. 90 Cap 3    Cholecalciferol (VITAMIN D) 2000 UNIT Tab Take 2,000 Units by mouth every day.      solifenacin (VESICARE) 5 MG tablet Take 5 mg by mouth every day. (Patient not taking: Reported on 4/10/2023)       No current [facility]-ordered facility-administered medications on file.       Allergies:  Amiodarone, Bactrim, Cipro xr, Metoprolol, Morphine, Phytoplex z-guard [petrolatum-zinc oxide], Pseudoephedrine, Qvar [beclomethasone dipropionate], Vibramycin, Atorvastatin calcium-polysorbate 80, Augmentin, Diltiazem, Flecainide, Keflex, Mucinex, Tramadol, Atorvastatin, and Tape    Health Maintenance:  "Reviewed.       Objective:     Vital signs reviewed  Exam:  /58 (BP Location: Right arm, Patient Position: Sitting, BP Cuff Size: Adult)   Pulse 89   Temp 36.6 °C (97.9 °F) (Temporal)   Ht 1.613 m (5' 3.5\")   Wt 80.3 kg (177 lb)   LMP 01/10/1975   SpO2 96%   BMI 30.86 kg/m²  Body mass index is 30.86 kg/m².    Gen: Alert and oriented, No apparent distress. Daughter present in exam room.  Eyes:   Lids normal. Glasses in place.   Lungs: Normal effort, CTA bilaterally, no wheezes, rhonchi, or rales  CV: Regular rate and rhythm. No murmurs, rubs, or gallops.  Ext: No clubbing, cyanosis, with trace edema. Using four wheel walker with ambulation.      Assessment & Plan:     82 y.o. female with the following -     1. Essential hypertension  Chronic stable problem.  Blood pressure is at goal.  Continue with lisinopril 5 mg daily, atenolol 50 mg every evening and furosemide 40 mg daily.    2. Persistent atrial fibrillation (HCC)  Chronic stable problem.  Continue follow-up with cardiology.  Continue with Xarelto 20 mg every evening.  Plan to check updated labs around June 2023.  - CBC WITH DIFFERENTIAL; Future    3. Acquired hypothyroidism  Chronic stable problem.  Continue with levothyroxine 50 mcg daily on an empty stomach.  Plan for updated labs around June 2023.  - TSH WITH REFLEX TO FT4; Future    4. Stage 3a chronic kidney disease (HCC)  Chronic stable problem.  Check updated labs around June 2023.  - Basic Metabolic Panel; Future    5. Screening for lipid disorders  Chronic stable problem.  Continue healthy diet and exercise as tolerated.  Due for updated labs around June 2023.  - Lipid Profile; Future    6. Encounter for osteoporosis screening in asymptomatic postmenopausal patient  Chronic stable problem.  Discussed screening for osteoporosis and she is in agreement.  - DS-BONE DENSITY STUDY (DEXA); Future      Return in about 4 months (around 8/10/2023) for Hypertension, thyroid , Labs.    Please note " that this dictation was created using voice recognition software. I have made every reasonable attempt to correct obvious errors, but I expect that there are errors of grammar and possibly content that I did not discover before finalizing the note.

## 2023-05-23 ENCOUNTER — PATIENT MESSAGE (OUTPATIENT)
Dept: HEALTH INFORMATION MANAGEMENT | Facility: OTHER | Age: 83
End: 2023-05-23

## 2023-05-23 ENCOUNTER — TELEPHONE (OUTPATIENT)
Dept: HEALTH INFORMATION MANAGEMENT | Facility: OTHER | Age: 83
End: 2023-05-23
Payer: MEDICARE

## 2023-05-25 ENCOUNTER — APPOINTMENT (OUTPATIENT)
Dept: CARDIOLOGY | Facility: MEDICAL CENTER | Age: 83
End: 2023-05-25
Attending: NURSE PRACTITIONER
Payer: MEDICARE

## 2023-06-26 ENCOUNTER — HOSPITAL ENCOUNTER (OUTPATIENT)
Dept: RADIOLOGY | Facility: MEDICAL CENTER | Age: 83
End: 2023-06-26
Attending: NURSE PRACTITIONER
Payer: MEDICARE

## 2023-06-26 DIAGNOSIS — Z78.0 ENCOUNTER FOR OSTEOPOROSIS SCREENING IN ASYMPTOMATIC POSTMENOPAUSAL PATIENT: ICD-10-CM

## 2023-06-26 DIAGNOSIS — Z13.820 ENCOUNTER FOR OSTEOPOROSIS SCREENING IN ASYMPTOMATIC POSTMENOPAUSAL PATIENT: ICD-10-CM

## 2023-06-26 PROCEDURE — 77080 DXA BONE DENSITY AXIAL: CPT

## 2023-06-30 ENCOUNTER — ANCILLARY PROCEDURE (OUTPATIENT)
Dept: CARDIOLOGY | Facility: MEDICAL CENTER | Age: 83
End: 2023-06-30
Attending: NURSE PRACTITIONER
Payer: MEDICARE

## 2023-06-30 DIAGNOSIS — I35.1 AORTIC VALVE INSUFFICIENCY, ETIOLOGY OF CARDIAC VALVE DISEASE UNSPECIFIED: ICD-10-CM

## 2023-06-30 LAB
LV EJECT FRACT  99904: 70
LV EJECT FRACT MOD 2C 99903: 63.12
LV EJECT FRACT MOD 4C 99902: 74.33
LV EJECT FRACT MOD BP 99901: 69.95

## 2023-06-30 PROCEDURE — 93306 TTE W/DOPPLER COMPLETE: CPT

## 2023-06-30 PROCEDURE — 93306 TTE W/DOPPLER COMPLETE: CPT | Mod: 26 | Performed by: INTERNAL MEDICINE

## 2023-07-25 ENCOUNTER — PATIENT MESSAGE (OUTPATIENT)
Dept: MEDICAL GROUP | Facility: PHYSICIAN GROUP | Age: 83
End: 2023-07-25
Payer: MEDICARE

## 2023-07-25 DIAGNOSIS — M48.00 SPINAL STENOSIS, MULTILEVEL: ICD-10-CM

## 2023-07-25 DIAGNOSIS — M17.0 PRIMARY OSTEOARTHRITIS OF BOTH KNEES: ICD-10-CM

## 2023-07-26 NOTE — PROGRESS NOTES
Daughter requesting referral to Dr. Haji at [facility] for ongoing lower back pain/hip pain. Referral placed.

## 2023-07-28 ENCOUNTER — NON-PROVIDER VISIT (OUTPATIENT)
Dept: CARDIOLOGY | Facility: MEDICAL CENTER | Age: 83
End: 2023-07-28
Attending: NURSE PRACTITIONER
Payer: MEDICARE

## 2023-07-28 DIAGNOSIS — Z95.0 CARDIAC PACEMAKER IN SITU: ICD-10-CM

## 2023-07-28 DIAGNOSIS — I48.19 PERSISTENT ATRIAL FIBRILLATION (HCC): Primary | ICD-10-CM

## 2023-07-28 PROCEDURE — 93280 PM DEVICE PROGR EVAL DUAL: CPT | Performed by: NURSE PRACTITIONER

## 2023-07-28 NOTE — PROGRESS NOTES
7/28/2023  [id number]  Donte Magaña    Patient here for annual in office device check    Device Type: [employer] Dual Chamber Pacemaker  AP%:  96  %:100  Battery Longevity:2.5 years  Lead Impedance: stable and within normal limits  Indication: complete heart block  Events:    Device interrogated and programmed by Russell Toussaint

## 2023-08-01 ENCOUNTER — PATIENT OUTREACH (OUTPATIENT)
Dept: HEALTH INFORMATION MANAGEMENT | Facility: OTHER | Age: 83
End: 2023-08-01
Payer: MEDICARE

## 2023-08-02 NOTE — PROGRESS NOTES
Anticoagulation Summary  As of 12/5/2017    INR goal:   2.0-3.0   TTR:   68.6 % (2.5 y)   Today's INR:   2.7   Maintenance plan:   3.75 mg (2.5 mg x 1.5) on Mon, Thu; 2.5 mg (2.5 mg x 1) all other days   Weekly total:   20 mg   Plan last modified:   Wayne Cheng, PharmGABRIEL (11/21/2017)   Next INR check:   12/12/2017   Target end date:   Indefinite    Indications    Atrial fibrillation (CMS-HCC) (Resolved) [I48.91]  Chronic anticoagulation [Z79.01]             Anticoagulation Episode Summary     INR check location:   Home Draw    Preferred lab:       Send INR reminders to:       Comments:   Winston YEAGER      Anticoagulation Care Providers     Provider Role Specialty Phone number    Lizzy Haywood M.D. Referring Cardiology [phone]    [facility] Responsible  [phone]    Peter HollowayD Responsible          Left message for patient to continue current dose of warfarin. Pending ablation. Follow up INR in one week.    Wayne Cheng, PeterD    
No risk alerts present

## 2023-08-03 ENCOUNTER — TELEPHONE (OUTPATIENT)
Dept: MEDICAL GROUP | Facility: PHYSICIAN GROUP | Age: 83
End: 2023-08-03
Payer: MEDICARE

## 2023-08-03 NOTE — TELEPHONE ENCOUNTER
Future Appointments         Provider Department Center    8/10/2023 1:00 PM (Arrive by 12:45 PM) VICTORIANO Montes [facility] [place]          ESTABLISHED PATIENT PRE-VISIT PLANNING     Patient was contacted to complete PVP.     Note: Patient will not be contacted if there is no indication to call.     1.  Reviewed notes from the last few office visits within the medical group: Yes, lOV 04/10/2023    2.  If any orders were placed at last visit or intended to be done for this visit (i.e. 6 mos follow-up), do we have Results/Consult Notes?           Labs - Labs ordered, NOT completed. Patient advised to complete prior to next appointment.  Note: If patient appointment is for lab review and patient did not complete labs, check with provider if OK to reschedule patient until labs completed.         Imaging - Imaging ordered, completed and results are in chart.         Referrals - Referral ordered, patient has NOT been seen. Pt has appointment with pain management.     3. Is this appointment scheduled as a Hospital Follow-Up? No    4.  Immunizations were updated in Epic using Reconcile Outside Information activity? Yes    5.  Patient is due for the following Health Maintenance Topics:   Health Maintenance Due   Topic Date Due    IMM ZOSTER VACCINES (1 of 2) Never done    IMM DTaP/Tdap/Td Vaccine (2 - Td or Tdap) 05/15/2022    Annual Wellness Visit  04/26/2023     6.  AHA (Pulse8) form printed for Provider? N/A  Left message for patient 08/03/2023 to call back regarding pre-visit planning. Please transfer call to [phone].    Pt has labs to complete and follow up on referral to spine & pain specialist   Lvm 08/04/2023  Spoke to Donte Huang's daughter, I reminded her to have Donte complete her labs and when her check in time is.

## 2023-09-05 ENCOUNTER — APPOINTMENT (RX ONLY)
Dept: URBAN - METROPOLITAN AREA CLINIC 6 | Facility: CLINIC | Age: 83
Setting detail: DERMATOLOGY
End: 2023-09-05

## 2023-09-05 DIAGNOSIS — L82.1 OTHER SEBORRHEIC KERATOSIS: ICD-10-CM

## 2023-09-05 DIAGNOSIS — D18.0 HEMANGIOMA: ICD-10-CM

## 2023-09-05 DIAGNOSIS — D22 MELANOCYTIC NEVI: ICD-10-CM

## 2023-09-05 DIAGNOSIS — L57.0 ACTINIC KERATOSIS: ICD-10-CM

## 2023-09-05 DIAGNOSIS — Z71.89 OTHER SPECIFIED COUNSELING: ICD-10-CM

## 2023-09-05 DIAGNOSIS — L81.4 OTHER MELANIN HYPERPIGMENTATION: ICD-10-CM

## 2023-09-05 DIAGNOSIS — Z85.828 PERSONAL HISTORY OF OTHER MALIGNANT NEOPLASM OF SKIN: ICD-10-CM

## 2023-09-05 PROBLEM — D22.62 MELANOCYTIC NEVI OF LEFT UPPER LIMB, INCLUDING SHOULDER: Status: ACTIVE | Noted: 2023-09-05

## 2023-09-05 PROBLEM — D48.5 NEOPLASM OF UNCERTAIN BEHAVIOR OF SKIN: Status: ACTIVE | Noted: 2023-09-05

## 2023-09-05 PROBLEM — D18.01 HEMANGIOMA OF SKIN AND SUBCUTANEOUS TISSUE: Status: ACTIVE | Noted: 2023-09-05

## 2023-09-05 PROBLEM — D22.61 MELANOCYTIC NEVI OF RIGHT UPPER LIMB, INCLUDING SHOULDER: Status: ACTIVE | Noted: 2023-09-05

## 2023-09-05 PROBLEM — D22.5 MELANOCYTIC NEVI OF TRUNK: Status: ACTIVE | Noted: 2023-09-05

## 2023-09-05 PROCEDURE — 11102 TANGNTL BX SKIN SINGLE LES: CPT

## 2023-09-05 PROCEDURE — ? COUNSELING

## 2023-09-05 PROCEDURE — ? DIAGNOSIS COMMENT

## 2023-09-05 PROCEDURE — ? LIQUID NITROGEN

## 2023-09-05 PROCEDURE — 99213 OFFICE O/P EST LOW 20 MIN: CPT | Mod: 25

## 2023-09-05 PROCEDURE — ? BIOPSY BY SHAVE METHOD

## 2023-09-05 PROCEDURE — ? ADDITIONAL NOTES

## 2023-09-05 PROCEDURE — 17000 DESTRUCT PREMALG LESION: CPT | Mod: 59

## 2023-09-05 ASSESSMENT — LOCATION SIMPLE DESCRIPTION DERM
LOCATION SIMPLE: RIGHT FOREARM
LOCATION SIMPLE: LEFT KNEE
LOCATION SIMPLE: CHEST
LOCATION SIMPLE: LEFT FOREARM
LOCATION SIMPLE: LEFT UPPER ARM
LOCATION SIMPLE: RIGHT CHEEK
LOCATION SIMPLE: RIGHT UPPER ARM
LOCATION SIMPLE: ABDOMEN

## 2023-09-05 ASSESSMENT — LOCATION ZONE DERM
LOCATION ZONE: TRUNK
LOCATION ZONE: FACE
LOCATION ZONE: ARM
LOCATION ZONE: LEG

## 2023-09-05 ASSESSMENT — LOCATION DETAILED DESCRIPTION DERM
LOCATION DETAILED: RIGHT VENTRAL PROXIMAL FOREARM
LOCATION DETAILED: RIGHT CENTRAL MALAR CHEEK
LOCATION DETAILED: RIGHT ANTERIOR DISTAL UPPER ARM
LOCATION DETAILED: LEFT ANTERIOR DISTAL UPPER ARM
LOCATION DETAILED: LEFT VENTRAL PROXIMAL FOREARM
LOCATION DETAILED: RIGHT MEDIAL MALAR CHEEK
LOCATION DETAILED: LOWER STERNUM
LOCATION DETAILED: EPIGASTRIC SKIN
LOCATION DETAILED: PERIUMBILICAL SKIN
LOCATION DETAILED: LEFT ANTERIOR PROXIMAL UPPER ARM
LOCATION DETAILED: LEFT KNEE
LOCATION DETAILED: RIGHT ANTECUBITAL SKIN
LOCATION DETAILED: RIGHT ANTERIOR PROXIMAL UPPER ARM

## 2023-09-05 NOTE — PROCEDURE: DIAGNOSIS COMMENT
Render Risk Assessment In Note?: no
Detail Level: Simple
Comment: [id number] C treated by Mohs 5/2022.

## 2023-09-05 NOTE — PROCEDURE: LIQUID NITROGEN
Render Post-Care Instructions In Note?: no
Detail Level: Detailed
Number Of Freeze-Thaw Cycles: 3 freeze-thaw cycles
Duration Of Freeze Thaw-Cycle (Seconds): 5
Post-Care Instructions: I reviewed with the patient in detail post-care instructions. Patient is to wear sunprotection, and avoid picking at any of the treated lesions. Pt may apply Vaseline to crusted or scabbing areas.
Show Applicator Variable?: Yes
Application Tool (Optional): Liquid Nitrogen Sprayer
Consent: The patient's consent was obtained including but not limited to risks of crusting, scabbing, blistering, scarring, darker or lighter pigmentary change, recurrence, incomplete removal and infection.

## 2023-09-05 NOTE — HPI: UPPER BODY SKIN CHECK
How Severe Are Your Spot(S)?: moderate
What Is The Reason For Today's Visit?: Upper Body Skin Exam
Additional History: UBE.

## 2023-09-05 NOTE — PROCEDURE: ADDITIONAL NOTES
Render Risk Assessment In Note?: no
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
Detail Level: Simple

## 2023-09-15 ENCOUNTER — HOSPITAL ENCOUNTER (OUTPATIENT)
Dept: LAB | Facility: MEDICAL CENTER | Age: 83
End: 2023-09-15
Attending: NURSE PRACTITIONER
Payer: MEDICARE

## 2023-09-15 DIAGNOSIS — Z13.220 SCREENING FOR LIPID DISORDERS: ICD-10-CM

## 2023-09-15 DIAGNOSIS — I48.19 PERSISTENT ATRIAL FIBRILLATION (HCC): ICD-10-CM

## 2023-09-15 DIAGNOSIS — N18.31 STAGE 3A CHRONIC KIDNEY DISEASE: ICD-10-CM

## 2023-09-15 DIAGNOSIS — E03.9 ACQUIRED HYPOTHYROIDISM: ICD-10-CM

## 2023-09-15 LAB
ANION GAP SERPL CALC-SCNC: 11 MMOL/L (ref 7–16)
BASOPHILS # BLD AUTO: 0.6 % (ref 0–1.8)
BASOPHILS # BLD: 0.05 K/UL (ref 0–0.12)
BUN SERPL-MCNC: 26 MG/DL (ref 8–22)
CALCIUM SERPL-MCNC: 9.6 MG/DL (ref 8.4–10.2)
CHLORIDE SERPL-SCNC: 104 MMOL/L (ref 96–112)
CHOLEST SERPL-MCNC: 169 MG/DL (ref 100–199)
CO2 SERPL-SCNC: 22 MMOL/L (ref 20–33)
CREAT SERPL-MCNC: 0.93 MG/DL (ref 0.5–1.4)
EOSINOPHIL # BLD AUTO: 0.38 K/UL (ref 0–0.51)
EOSINOPHIL NFR BLD: 4.6 % (ref 0–6.9)
ERYTHROCYTE [DISTWIDTH] IN BLOOD BY AUTOMATED COUNT: 49.5 FL (ref 35.9–50)
FASTING STATUS PATIENT QL REPORTED: NORMAL
GFR SERPLBLD CREATININE-BSD FMLA CKD-EPI: 60 ML/MIN/1.73 M 2
GLUCOSE SERPL-MCNC: 124 MG/DL (ref 65–99)
HCT VFR BLD AUTO: 44.3 % (ref 37–47)
HDLC SERPL-MCNC: 64 MG/DL
HGB BLD-MCNC: 14.1 G/DL (ref 12–16)
IMM GRANULOCYTES # BLD AUTO: 0.03 K/UL (ref 0–0.11)
IMM GRANULOCYTES NFR BLD AUTO: 0.4 % (ref 0–0.9)
LDLC SERPL CALC-MCNC: 87 MG/DL
LYMPHOCYTES # BLD AUTO: 2.22 K/UL (ref 1–4.8)
LYMPHOCYTES NFR BLD: 27.1 % (ref 22–41)
MCH RBC QN AUTO: 31.7 PG (ref 27–33)
MCHC RBC AUTO-ENTMCNC: 31.8 G/DL (ref 32.2–35.5)
MCV RBC AUTO: 99.6 FL (ref 81.4–97.8)
MONOCYTES # BLD AUTO: 0.51 K/UL (ref 0–0.85)
MONOCYTES NFR BLD AUTO: 6.2 % (ref 0–13.4)
NEUTROPHILS # BLD AUTO: 5 K/UL (ref 1.82–7.42)
NEUTROPHILS NFR BLD: 61.1 % (ref 44–72)
NRBC # BLD AUTO: 0 K/UL
NRBC BLD-RTO: 0 /100 WBC (ref 0–0.2)
PLATELET # BLD AUTO: 273 K/UL (ref 164–446)
PMV BLD AUTO: 9.4 FL (ref 9–12.9)
POTASSIUM SERPL-SCNC: 5 MMOL/L (ref 3.6–5.5)
RBC # BLD AUTO: 4.45 M/UL (ref 4.2–5.4)
SODIUM SERPL-SCNC: 137 MMOL/L (ref 135–145)
TRIGL SERPL-MCNC: 91 MG/DL (ref 0–149)
TSH SERPL DL<=0.005 MIU/L-ACNC: 0.69 UIU/ML (ref 0.38–5.33)
WBC # BLD AUTO: 8.2 K/UL (ref 4.8–10.8)

## 2023-09-15 PROCEDURE — 84443 ASSAY THYROID STIM HORMONE: CPT

## 2023-09-15 PROCEDURE — 80048 BASIC METABOLIC PNL TOTAL CA: CPT

## 2023-09-15 PROCEDURE — 36415 COLL VENOUS BLD VENIPUNCTURE: CPT

## 2023-09-15 PROCEDURE — 85025 COMPLETE CBC W/AUTO DIFF WBC: CPT

## 2023-09-15 PROCEDURE — 80061 LIPID PANEL: CPT

## 2023-09-18 ENCOUNTER — HOME HEALTH ADMISSION (OUTPATIENT)
Dept: HOME HEALTH SERVICES | Facility: HOME HEALTHCARE | Age: 83
End: 2023-09-18
Payer: MEDICARE

## 2023-09-18 ENCOUNTER — OFFICE VISIT (OUTPATIENT)
Dept: MEDICAL GROUP | Facility: PHYSICIAN GROUP | Age: 83
End: 2023-09-18
Payer: MEDICARE

## 2023-09-18 VITALS
WEIGHT: 186 LBS | SYSTOLIC BLOOD PRESSURE: 112 MMHG | HEIGHT: 64 IN | HEART RATE: 86 BPM | OXYGEN SATURATION: 96 % | TEMPERATURE: 97.7 F | DIASTOLIC BLOOD PRESSURE: 56 MMHG | BODY MASS INDEX: 31.76 KG/M2

## 2023-09-18 DIAGNOSIS — L89.153 PRESSURE INJURY OF SACRAL REGION, STAGE 3 (HCC): ICD-10-CM

## 2023-09-18 DIAGNOSIS — M32.9 PERSONAL HISTORY OF SYSTEMIC LUPUS ERYTHEMATOSUS (SLE) (HCC): ICD-10-CM

## 2023-09-18 DIAGNOSIS — D68.69 OTHER THROMBOPHILIA (HCC): ICD-10-CM

## 2023-09-18 DIAGNOSIS — I10 ESSENTIAL HYPERTENSION: ICD-10-CM

## 2023-09-18 DIAGNOSIS — I48.19 PERSISTENT ATRIAL FIBRILLATION (HCC): ICD-10-CM

## 2023-09-18 DIAGNOSIS — E03.9 ACQUIRED HYPOTHYROIDISM: ICD-10-CM

## 2023-09-18 PROCEDURE — 99214 OFFICE O/P EST MOD 30 MIN: CPT | Performed by: NURSE PRACTITIONER

## 2023-09-18 PROCEDURE — 3078F DIAST BP <80 MM HG: CPT | Performed by: NURSE PRACTITIONER

## 2023-09-18 PROCEDURE — 3074F SYST BP LT 130 MM HG: CPT | Performed by: NURSE PRACTITIONER

## 2023-09-18 ASSESSMENT — FIBROSIS 4 INDEX: FIB4 SCORE: 2.22

## 2023-09-18 NOTE — PROGRESS NOTES
"Subjective:     CC: Wound     HPI:   Donte presents today with her daughter Reina for the following:    Hypothyroidism  Recent TSH WNL.  Continues levothyroxine 50 mcg daily on an empty stomach.    Essential hypertension  Blood pressure today 112/56. Continues lisinopril 5 mg daily and atenolol 50 mg every evening.  She also taking furosemide 40 mg twice daily.  Recent CMP unremarkable except for elevated BUN at 26.    Persistent atrial fibrillation (HCC)  Followed by cardiology.  Continues atenolol and Xarelto.    Pressure injury of sacral region, stage 3 (HCC)  The wound on the lower back area has been present for 3-4 weeks ago. She is having bloody discharge. She is sitting for the majority of the day. She is not having fever or chills.       Past Medical History:   Diagnosis Date    A-fib ([facility])     Anesthesia     \"Tachycardia for 5 days after cataract surgery\"    Anticoagulant long-term use 1/12/2012    Arthritis     osteo-Knees, hips    Atrial fibrillation (HCC)     Backpain     R hip    Bowel habit changes     diarrhea    Breath shortness     with exertion, prn O2 2L    Bronchitis Nov, 2013    CAD (coronary artery disease)     Depression     Glaucoma 5/3/2011    Hematoma complicating a procedure 11/3/2012    Hemorrhagic disorder ([facility])     bruising/coumadin    Hypertension     Hypothyroid     Lupus ([facility])     Macular degeneration     Menopause 1/12/2012    Mitral regurgitation 10/30/2012    Obesity 1/12/2012    Pacemaker 2018    Pneumonia feb,2013    Pre-syncope 6/29/2018    Pulmonary hypertension (HCC) 10/30/2012    PVC's (premature ventricular contractions) 1/12/2012    Senile nuclear sclerosis     Spinal stenosis of lumbar region at multiple levels     Unspecified cataract     repaired bilateral    Unspecified urinary incontinence     Urinary bladder disorder        Social History     Tobacco Use    Smoking status: Never    Smokeless tobacco: Never   Vaping Use    Vaping Use: Never used   Substance Use Topics " "   Alcohol use: No    Drug use: No       Current Outpatient Medications Ordered in Epic   Medication Sig Dispense Refill    rivaroxaban (XARELTO) 20 MG Tab tablet Take 1 Tablet by mouth with dinner. 100 Tablet 3    sertraline (ZOLOFT) 50 MG Tab Take 1 Tablet by mouth every day. 90 Tablet 3    lisinopril (PRINIVIL) 5 MG Tab Take 1 Tablet by mouth every day. 100 Tablet 3    atenolol (TENORMIN) 50 MG Tab Take 1 Tablet by mouth every evening. 100 Tablet 3    furosemide (LASIX) 40 MG Tab Take 1 Tablet by mouth 2 times a day. 200 Tablet 3    PREBIOTIC PRODUCT PO Take 2 Tablets by mouth every day.      ipratropium (ATROVENT) 0.03 % Solution Administer 2 Sprays into affected nostril(S) every 12 hours. 30 mL 2    magnesium oxide (MAG-OX) 400 MG Tab tablet Take 1 Tablet by mouth every day. 90 Tablet 3    Mirabegron ER (MYRBETRIQ) 50 MG TABLET SR 24 HR Take 50 mg by mouth every day. 90 Tablet 3    levothyroxine (SYNTHROID) 50 MCG Tab Take 1 Tablet by mouth every morning on an empty stomach. 90 Tablet 3    Sennosides (SENOKOT PO) Take 1 Tablet by mouth 1 time a day as needed (constipation).      Acetaminophen 500 MG Cap Take 2 Caps by mouth 3 times a day as needed. Indications: Pain      Lutein 20 MG Cap Take 1 Cap by mouth every day. 90 Cap 3    Cholecalciferol (VITAMIN D) 2000 UNIT Tab Take 2,000 Units by mouth every day.       No current [facility]-ordered facility-administered medications on file.       Allergies:  Amiodarone, Bactrim, Cipro xr, Metoprolol, Morphine, Phytoplex z-guard [petrolatum-zinc oxide], Pseudoephedrine, Qvar [beclomethasone dipropionate], Vibramycin, Atorvastatin calcium-polysorbate 80, Augmentin, Diltiazem, Flecainide, Keflex, Mucinex, Tramadol, Atorvastatin, and Tape    Health Maintenance: Reviewed      Objective:     Vital signs reviewed  Exam:  /56 (BP Location: Right arm, Patient Position: Sitting, BP Cuff Size: Adult)   Pulse 86   Temp 36.5 °C (97.7 °F) (Temporal)   Ht 1.626 m (5' 4\")   Wt " 84.4 kg (186 lb)   LMP 01/10/1975   SpO2 96%   BMI 31.93 kg/m²  Body mass index is 31.93 kg/m².    Gen: Alert and oriented, No apparent distress. Daughter present.   Eyes:   Lids normal. Glasses in place.   Lungs: Normal effort, no audible wheezes  CV: Skin pink, warm and dry.  Ext: No clubbing, cyanosis, edema.  Sacrum:  nonhealing stage 3 pressure ulcer with serosanguineous discharge, Telfa pad placed over the area.  No wound slough or discharge present.    A chaperone was offered to the patient during today's exam. Chaperone name: Beatrice Retana MA was present.        Assessment & Plan:     82 y.o. female with the following -     Discussed and reviewed lab results from 9/15/2023.    1. Pressure injury of sacral region, stage 3 (HCC)  Acute uncomplicated problem.  She would like to have home health with wound care come to her residency.  Referral placed to home health.  Encourage patient to change position frequently especially during the daytime.  Telfa padding provided to patient to place over the wound at this time.  - Referral to Home Health    2. Acquired hypothyroidism  Chronic stable problem.  Continue levothyroxine 50 mcg daily on empty stomach.  - Referral to Home Health    3. Essential hypertension  Chronic stable problem.  Blood pressure is at goal.  Continue lisinopril 5 mg daily, atenolol 50 mg daily and furosemide 40 mg twice daily.  - Referral to Home Health    4. Persistent atrial fibrillation (HCC)  Chronic stable problem.  Continue follow-up with cardiology.  Continue atenolol and Xarelto.  - Referral to Home Health    5. Other thrombophilia (HCC)  Chronic stable problem.  Continue follow-up with cardiology.  Continue atenolol and Xarelto.  - Referral to Home Health    6. Personal history of systemic lupus erythematosus (SLE) (HCC)  Chronic stable problem.  She is asymptomatic.  Monitor annually and as needed.    HCC Gap Form    Diagnosis to address: M32.9 - Personal history of systemic lupus  erythematosus (SLE) (HCC)  Assessment and plan: Chronic, stable. Continue with current defined treatment plan: she is asymptomatic, seen endocrinology in the past. Follow-up at least annually.  Diagnosis: D68.69 - Other thrombophilia (HCC)  Assessment and plan: Chronic, stable, as based on today's assessment and impact on other conditions evaluated today. Continue with current treatment plan: Atenolol 50 mg every evening and Xarelto 20 mg every evening follow-up with specialist as directed, but at least annually.  Last edited 09/18/23 14:46 PDT by TIMUR Montes.           Return for Keep upcoming 10/17/2023 appointment..    Please note that this dictation was created using voice recognition software. I have made every reasonable attempt to correct obvious errors, but I expect that there are errors of grammar and possibly content that I did not discover before finalizing the note.

## 2023-09-18 NOTE — ASSESSMENT & PLAN NOTE
Blood pressure today 112/56. Continues lisinopril 5 mg daily and atenolol 50 mg every evening.  She also taking furosemide 40 mg twice daily.  Recent CMP unremarkable except for elevated BUN at 26.

## 2023-09-18 NOTE — ASSESSMENT & PLAN NOTE
The wound on the lower back area has been present for 3-4 weeks ago. She is having bloody discharge. She is sitting for the majority of the day. She is not having fever or chills.

## 2023-09-20 ENCOUNTER — HOME CARE VISIT (OUTPATIENT)
Dept: HOME HEALTH SERVICES | Facility: HOME HEALTHCARE | Age: 83
End: 2023-09-20

## 2023-09-21 ENCOUNTER — HOME CARE VISIT (OUTPATIENT)
Dept: HOME HEALTH SERVICES | Facility: HOME HEALTHCARE | Age: 83
End: 2023-09-21
Payer: MEDICARE

## 2023-09-21 VITALS
TEMPERATURE: 98 F | DIASTOLIC BLOOD PRESSURE: 60 MMHG | HEART RATE: 80 BPM | SYSTOLIC BLOOD PRESSURE: 122 MMHG | WEIGHT: 181.2 LBS | BODY MASS INDEX: 30.93 KG/M2 | OXYGEN SATURATION: 98 % | RESPIRATION RATE: 16 BRPM | HEIGHT: 64 IN

## 2023-09-21 PROCEDURE — 665001 SOC-HOME HEALTH

## 2023-09-21 PROCEDURE — G0493 RN CARE EA 15 MIN HH/HOSPICE: HCPCS

## 2023-09-21 ASSESSMENT — ENCOUNTER SYMPTOMS
PERSON REPORTING PAIN: PATIENT
NAUSEA: DENIES
PAIN LOCATION - PAIN SEVERITY: 1/10
LOWEST PAIN SEVERITY IN PAST 24 HOURS: 0/10
PAIN SEVERITY GOAL: 0/10
PAIN LOCATION - PAIN FREQUENCY: INTERMITTENT
PAIN LOCATION - PAIN DURATION: ACUTE
BOWEL PATTERN NORMAL: 1
SUBJECTIVE PAIN PROGRESSION: UNCHANGED
PAIN LOCATION - PAIN QUALITY: ACHE
STOOL FREQUENCY: DAILY
PAIN LOCATION: BUTTOCK WOUND
HIGHEST PAIN SEVERITY IN PAST 24 HOURS: 3/10
PAIN: 1
VOMITING: DENIES

## 2023-09-21 ASSESSMENT — ACTIVITIES OF DAILY LIVING (ADL): OASIS_M1830: 03

## 2023-09-21 ASSESSMENT — FIBROSIS 4 INDEX: FIB4 SCORE: 2.22

## 2023-09-21 NOTE — Clinical Note
[facility] reconciliation process done. Medication list left in home care folder. See MAR for drug allergy interactions. There are  major drug to drug interactions.         Drug-Drug: diclofenac sodium and sertraline   Toxic effects may be increased with concurrent administration of diclofenac sodium and sertraline. The risk of upper gastrointestinal bleeding may be increased. Patients taking both drugs concurrently should be educated about the signs and symptoms of GI bleeding.    Drug-Drug: diclofenac sodium and rivaroxaban   Use of rivaroxaban with diclofenac sodium may increase the risk of bleeding.                 The best contact phone number is [phone] and Dtdemetria Huang manages the medications.

## 2023-09-21 NOTE — Clinical Note
PRIMARY DIAGNOSIS: Pressure injury of sacral region, stage 3.             SKILLED NEED: Health assessment, Medication education, education and monitoring of disease processes, wound care            NURSING FREQUENCY: 1w1, 2w5 3 PRN            ZIP CODE: [id number]             DISCIPLINES ORDERED:              INSURANCE: SENIOR CARE PLUS            CERTIFICATION PERIOD: 9/21/23-11/19/23            SPECIAL CONSIDERATION: none

## 2023-09-22 ENCOUNTER — DOCUMENTATION (OUTPATIENT)
Dept: MEDICAL GROUP | Facility: PHYSICIAN GROUP | Age: 83
End: 2023-09-22
Payer: MEDICARE

## 2023-09-22 NOTE — PROGRESS NOTES
"Medication chart review for [facility] services    Received referral from [facility].   Medications reviewed  compared with discharge summary if available.  Discharge summary date, if applicable:   N/a    Current medication list per [facility]     Medication list one, patient is currently taking    Current Outpatient Medications:     calcium carbonate, 1,000 mg, Oral, TID PRN    diclofenac sodium, 2 g, Topical, BID PRN    non-formulary med, 1 Lozenge, Oral, PRN    rivaroxaban, 20 mg, Oral, PM MEAL    sertraline, 50 mg, Oral, QDAY    lisinopril, 5 mg, Oral, QDAY    atenolol, 50 mg, Oral, Q EVENING    furosemide, 40 mg, Oral, BID (Patient taking differently: 40 mg, Oral, 1 TIME DAILY PRN, edema, Indications: Edema)    PREBIOTIC PRODUCT PO, 2 Tablet, Oral, DAILY    magnesium oxide, 400 mg, Oral, QDAY    Myrbetriq, 50 mg, Oral, DAILY    levothyroxine, 50 mcg, Oral, AM ES    Sennosides (SENOKOT PO), 1 Tablet, Oral, QDAY PRN    Acetaminophen, 2 Capsule, Oral, TID PRN    Lutein, 1 Capsule, Oral, DAILY    vitamin D, 2,000 Units, Oral, DAILY      Medication list two, drugs that the patient has been prescribed or recommended to take by their healthcare provider on discharge summary  N/a    Allergies   Allergen Reactions    Amiodarone Hives     Throat and tongue itching    Bactrim Shortness of Breath    Cipro Xr Swelling    Metoprolol Swelling     Causes throat swelling    Morphine Unspecified     Hallucinations    Phytoplex Z-Guard [Petrolatum-Zinc Oxide] Unspecified     \"causes burning\"    Pseudoephedrine Palpitations    Qvar [Beclomethasone Dipropionate] Unspecified     Pressure on heart      Vibramycin Shortness of Breath    Atorvastatin Calcium-Polysorbate 80 Unspecified     Muscle aches      Augmentin Unspecified     Unknown reaction    Diltiazem Rash     rash    Flecainide Unspecified     dizziness    Keflex Unspecified     Pt states \"Unsure\".    Mucinex Unspecified     GI Distress      Tramadol Unspecified    "  crying    Atorvastatin Myalgia    Tape Rash     Paper tape okay       Labs     Lab Results   Component Value Date/Time    SODIUM 137 09/15/2023 01:47 PM    POTASSIUM 5.0 09/15/2023 01:47 PM    CHLORIDE 104 09/15/2023 01:47 PM    CO2 22 09/15/2023 01:47 PM    GLUCOSE 124 (H) 09/15/2023 01:47 PM    BUN 26 (H) 09/15/2023 01:47 PM    CREATININE 0.93 09/15/2023 01:47 PM    CREATININE 0.78 07/29/2010 12:00 AM    BUNCREATRAT 17 07/29/2010 12:00 AM    GLOMRATE >59 07/29/2010 12:00 AM     Lab Results   Component Value Date/Time    ALKPHOSPHAT 75 12/29/2022 04:52 AM    ASTSGOT 27 12/29/2022 04:52 AM    ALTSGPT 14 12/29/2022 04:52 AM    TBILIRUBIN 0.7 12/29/2022 04:52 AM    INR 0.99 01/16/2023 01:43 PM    ALBUMIN 3.5 12/29/2022 04:52 AM        Assessment for clinically significant drug interactions, drug omissions/additions, duplicative therapies.            CC   Rosi Mendoza, A.P.R.N.  [address] [address]  Fax: [phone]    [facility]  Phone [phone] fax [phone]    This note was created using voice recognition software (Dragon). The accuracy of the dictation is limited by the abilities of the software. I have reviewed the note prior to signing, however some errors in grammar and context are still possible. If you have any questions related to this note please do not hesitate to contact our office.

## 2023-09-25 ENCOUNTER — HOME CARE VISIT (OUTPATIENT)
Dept: HOME HEALTH SERVICES | Facility: HOME HEALTHCARE | Age: 83
End: 2023-09-25
Payer: MEDICARE

## 2023-09-25 VITALS
SYSTOLIC BLOOD PRESSURE: 124 MMHG | OXYGEN SATURATION: 97 % | RESPIRATION RATE: 16 BRPM | TEMPERATURE: 98.2 F | HEART RATE: 83 BPM | DIASTOLIC BLOOD PRESSURE: 70 MMHG

## 2023-09-25 PROCEDURE — G0299 HHS/HOSPICE OF RN EA 15 MIN: HCPCS

## 2023-09-25 ASSESSMENT — ENCOUNTER SYMPTOMS
PAIN LOCATION - PAIN SEVERITY: 1/10
PAIN LOCATION - PAIN FREQUENCY: INTERMITTENT
PAIN LOCATION - PAIN QUALITY: SORE
FATIGUES EASILY: 1
STOOL FREQUENCY: DAILY
MUSCLE WEAKNESS: 1
HIGHEST PAIN SEVERITY IN PAST 24 HOURS: 4/10
LAST BOWEL MOVEMENT: 66741
LIMITED RANGE OF MOTION: 1
PAIN LOCATION - PAIN DURATION: DAILY
PAIN: 1
PAIN SEVERITY GOAL: 1/10
BOWEL PATTERN NORMAL: 1
DRY SKIN: 1
PAIN LOCATION - EXACERBATING FACTORS: SITTING
PERSON REPORTING PAIN: PATIENT
LOWEST PAIN SEVERITY IN PAST 24 HOURS: 2/10
SUBJECTIVE PAIN PROGRESSION: WAXING AND WANING
PAIN LOCATION - RELIEVING FACTORS: PAIN MED

## 2023-09-25 ASSESSMENT — PAIN SCALES - PAIN ASSESSMENT IN ADVANCED DEMENTIA (PAINAD)
CONSOLABILITY: 0 - NO NEED TO CONSOLE.
NEGVOCALIZATION: 0 - NONE.
TOTALSCORE: 0
FACIALEXPRESSION: 0 - SMILING OR INEXPRESSIVE.
BODYLANGUAGE: 0 - RELAXED.

## 2023-09-25 NOTE — Clinical Note
pt was in the commode sitting when sn came and she was crying. walked pt the living area , assited in dressing up. pt said she had taken shower  and she didn't know why she was crying. alert and oriented  x3, forgetful . medications reviewed, states her daughter fills weekly medbox and she is not taking lasix only when she has swelling. teaching on xarelto an anticoagulant given to prevent blood clots monitor for easily bruisng and bleeding. sacral wounds no dressing , wound care done . pt instructed on pressure ulcer prevention , change position, frequently,offloading , keep skin clean and dry , incontinent care ,adequate nutrition/hydration. Pt/Cg response to the services provided: tolerated wound care, denies chest pains,/palpitations ,breathing problems ,bleeding or falls

## 2023-09-27 ENCOUNTER — HOME CARE VISIT (OUTPATIENT)
Dept: HOME HEALTH SERVICES | Facility: HOME HEALTHCARE | Age: 83
End: 2023-09-27
Payer: MEDICARE

## 2023-09-27 NOTE — CASE COMMUNICATION
noted  ----- Message -----  From: Kelly Caba R.N.  Sent: 9/25/2023   6:25 PM PDT  To: Chela Mohamud R.N.; Sneha Medina R.N.      pt was in the commode sitting when sn came and she was crying. walked pt the living area , assited in dressing up. pt said she had taken shower  and she didn't know why she was crying. alert and oriented  x3, forgetful . medications reviewed, states her daughter fills weekly medbox and she is not taki ng lasix only when she has swelling. teaching on xarelto an anticoagulant given to prevent blood clots monitor for easily bruisng and bleeding. sacral wounds no dressing , wound care done . pt instructed on pressure ulcer prevention , change position, frequently,offloading , keep skin clean and dry , incontinent care ,adequate nutrition/hydration. Pt/Cg response to the services provided: tolerated wound care, denies chest pains,/palpita tions ,breathing problems ,bleeding or falls

## 2023-09-27 NOTE — CASE COMMUNICATION
Quality Review Completed for 9/21 SOC OASIS by LILLIE Stockton RN on 9/27/2023:     Edits completed by LILLIE Stockton RN:     1.  and  diagnosis coding updated per chart review  2.  changed to 9/21 for LSOC ordered  3.  added #9 other risks to risks for readmits per EMR reports  4.  D. Therapeutic diet for HTN  5. Narrative documents Supervision level of assist with dressing, ambulation, transfers and Min level of assist  with bathing. Changed , 1810, 1820, 1845 to 2.  to 3

## 2023-09-28 ENCOUNTER — HOME CARE VISIT (OUTPATIENT)
Dept: HOME HEALTH SERVICES | Facility: HOME HEALTHCARE | Age: 83
End: 2023-09-28
Payer: MEDICARE

## 2023-09-28 VITALS
SYSTOLIC BLOOD PRESSURE: 90 MMHG | TEMPERATURE: 96.8 F | HEART RATE: 68 BPM | RESPIRATION RATE: 18 BRPM | DIASTOLIC BLOOD PRESSURE: 50 MMHG | OXYGEN SATURATION: 95 %

## 2023-09-28 PROCEDURE — G0299 HHS/HOSPICE OF RN EA 15 MIN: HCPCS

## 2023-09-28 ASSESSMENT — ENCOUNTER SYMPTOMS
STOOL FREQUENCY: DAILY
LAST BOWEL MOVEMENT: 66744
DENIES PAIN: 1
BOWEL PATTERN NORMAL: 1
POOR JUDGMENT: 1

## 2023-09-28 NOTE — CASE COMMUNICATION
I agree with these changes.  ----- Message -----  From: Angelica Stockton R.N.  Sent: 9/27/2023   9:34 AM PDT  To: Hellen Alas R.N.      Quality Review Completed for 9/21 SOC OASIS by LILLIE Stockton, RN on 9/27/2023:     Edits completed by LILLIE Stockton RN:     1.  and  diagnosis coding updated per chart review  2.  changed to 9/21 for LSOC ordered  3.  added #9 other risks to risks for readmits per EMR reports  4.   D. Therapeutic diet for HTN  5. Narrative documents Supervision level of assist with dressing, ambulation, transfers and Min level of assist with bathing. Changed , 1810, 1820, 1845 to 2.  to 3

## 2023-09-29 ASSESSMENT — ACTIVITIES OF DAILY LIVING (ADL)
AMBULATION ASSISTANCE: STAND BY ASSIST
CURRENT_FUNCTION: STAND BY ASSIST

## 2023-09-29 ASSESSMENT — ENCOUNTER SYMPTOMS: MUSCLE WEAKNESS: 1

## 2023-10-02 ENCOUNTER — HOME CARE VISIT (OUTPATIENT)
Dept: HOME HEALTH SERVICES | Facility: HOME HEALTHCARE | Age: 83
End: 2023-10-02
Payer: MEDICARE

## 2023-10-02 VITALS
TEMPERATURE: 96.9 F | DIASTOLIC BLOOD PRESSURE: 50 MMHG | SYSTOLIC BLOOD PRESSURE: 110 MMHG | RESPIRATION RATE: 18 BRPM | OXYGEN SATURATION: 96 % | HEART RATE: 81 BPM

## 2023-10-02 PROCEDURE — G0299 HHS/HOSPICE OF RN EA 15 MIN: HCPCS

## 2023-10-02 ASSESSMENT — ENCOUNTER SYMPTOMS
PAIN LOCATION - PAIN FREQUENCY: FREQUENT
BOWEL PATTERN NORMAL: 1
MUSCLE WEAKNESS: 1
SUBJECTIVE PAIN PROGRESSION: GRADUALLY IMPROVING
PAIN SEVERITY GOAL: 0/10
LIMITED RANGE OF MOTION: 1
LAST BOWEL MOVEMENT: 66749
HIGHEST PAIN SEVERITY IN PAST 24 HOURS: 5/10
PAIN LOCATION - PAIN SEVERITY: 3/10
PAIN: 1
PAIN LOCATION - EXACERBATING FACTORS: PRESSURE ULCERS
STOOL FREQUENCY: DAILY
PERSON REPORTING PAIN: PATIENT
PAIN LOCATION - PAIN QUALITY: SORE
PAIN LOCATION - PAIN DURATION: ONGOING
LOWEST PAIN SEVERITY IN PAST 24 HOURS: 0/10

## 2023-10-02 ASSESSMENT — ACTIVITIES OF DAILY LIVING (ADL)
AMBULATION ASSISTANCE: STAND BY ASSIST
CURRENT_FUNCTION: ONE PERSON

## 2023-10-03 ENCOUNTER — HOME CARE VISIT (OUTPATIENT)
Dept: HOME HEALTH SERVICES | Facility: HOME HEALTHCARE | Age: 83
End: 2023-10-03
Payer: MEDICARE

## 2023-10-03 DIAGNOSIS — E03.8 OTHER SPECIFIED HYPOTHYROIDISM: ICD-10-CM

## 2023-10-03 RX ORDER — LEVOTHYROXINE SODIUM 0.05 MG/1
50 TABLET ORAL
Qty: 90 TABLET | Refills: 3 | Status: SHIPPED | OUTPATIENT
Start: 2023-10-03

## 2023-10-03 ASSESSMENT — ENCOUNTER SYMPTOMS: POOR JUDGMENT: 1

## 2023-10-03 NOTE — CASE COMMUNICATION
PT arrived for scheduled visit 10/3 and pt was not in her room. PT got assistance from several staff members and daughter via phone to look for pt, but was unable to locate her. Pt will be returned to cancellation list and possibly seen by another PT this week as primary PT is off.

## 2023-10-03 NOTE — Clinical Note
Genesis Banda.  ----- Message -----  From: Veronika Marquez, PT  Sent: 10/3/2023   3:31 PM PDT  To: Chela Mohamud R.N.; Adalberto Mello, BELKYS      PT arrived for scheduled visit 10/3 and pt was not in her room. PT got assistance from several staff members and daughter via phone to look for pt, but was unable to locate her. Pt will be returned to cancellation list and possibly seen by another PT this week as primary PT is off.

## 2023-10-03 NOTE — TELEPHONE ENCOUNTER
Requested Prescriptions     Pending Prescriptions Disp Refills   • levothyroxine (SYNTHROID) 50 MCG Tab 90 Tablet 3     Sig: Take 1 Tablet by mouth every morning on an empty stomach. Indications: Underactive Thyroid

## 2023-10-04 ENCOUNTER — HOME CARE VISIT (OUTPATIENT)
Dept: HOME HEALTH SERVICES | Facility: HOME HEALTHCARE | Age: 83
End: 2023-10-04
Payer: MEDICARE

## 2023-10-04 NOTE — CASE COMMUNICATION
PT called and attempted to schedule PT evaluation. Cancel PT order due to daughter request. Patient is having an epidural and SI injections and would like to wait until after that to see if PT is needed.

## 2023-10-04 NOTE — Clinical Note
Thanks Denia  ----- Message -----  From: Denia Goncalves, PT  Sent: 10/4/2023   4:39 PM PDT  To: Chela Mohamud R.N.; Sneha Medina R.N.; *      PT called and attempted to schedule PT evaluation. Cancel PT order due to daughter request. Patient is having an epidural and SI injections and would like to wait until after that to see if PT is needed.

## 2023-10-05 ENCOUNTER — HOME CARE VISIT (OUTPATIENT)
Dept: HOME HEALTH SERVICES | Facility: HOME HEALTHCARE | Age: 83
End: 2023-10-05
Payer: MEDICARE

## 2023-10-05 VITALS
TEMPERATURE: 98.2 F | DIASTOLIC BLOOD PRESSURE: 70 MMHG | OXYGEN SATURATION: 97 % | SYSTOLIC BLOOD PRESSURE: 120 MMHG | RESPIRATION RATE: 18 BRPM | HEART RATE: 67 BPM

## 2023-10-05 PROCEDURE — G0299 HHS/HOSPICE OF RN EA 15 MIN: HCPCS

## 2023-10-05 ASSESSMENT — ENCOUNTER SYMPTOMS
LAST BOWEL MOVEMENT: 66752
NAUSEA: DENES
PERSON REPORTING PAIN: PATIENT
SUBJECTIVE PAIN PROGRESSION: UNCHANGED
HIGHEST PAIN SEVERITY IN PAST 24 HOURS: 5/10
STOOL FREQUENCY: LESS THAN DAILY
MUSCLE WEAKNESS: 1
LOWEST PAIN SEVERITY IN PAST 24 HOURS: 5/10
BOWEL PATTERN NORMAL: 1
VOMITING: DENIES
PAIN SEVERITY GOAL: 0/10
PAIN: 1

## 2023-10-05 NOTE — CASE COMMUNICATION
noted  ----- Message -----  From: Denia Goncalves, PT  Sent: 10/4/2023   4:39 PM PDT  To: Chela Mohamud R.N.; Sneha Medina R.N.; *      PT called and attempted to schedule PT evaluation. Cancel PT order due to daughter request. Patient is having an epidural and SI injections and would like to wait until after that to see if PT is needed.

## 2023-10-09 ENCOUNTER — HOME CARE VISIT (OUTPATIENT)
Dept: HOME HEALTH SERVICES | Facility: HOME HEALTHCARE | Age: 83
End: 2023-10-09
Payer: MEDICARE

## 2023-10-09 VITALS
TEMPERATURE: 97.7 F | RESPIRATION RATE: 18 BRPM | DIASTOLIC BLOOD PRESSURE: 60 MMHG | OXYGEN SATURATION: 94 % | HEART RATE: 86 BPM | SYSTOLIC BLOOD PRESSURE: 120 MMHG

## 2023-10-09 PROCEDURE — G0299 HHS/HOSPICE OF RN EA 15 MIN: HCPCS

## 2023-10-09 ASSESSMENT — ENCOUNTER SYMPTOMS
DENIES PAIN: 1
POOR JUDGMENT: 1
PERSON REPORTING PAIN: PATIENT

## 2023-10-10 PROCEDURE — G0180 MD CERTIFICATION HHA PATIENT: HCPCS | Performed by: NURSE PRACTITIONER

## 2023-10-10 NOTE — CASE COMMUNICATION
noted  ----- Message -----  From: Veronika Marquez, PT  Sent: 10/3/2023   3:31 PM PDT  To: Chela Mohamud R.N.; Adalberto Mello PT      PT arrived for scheduled visit 10/3 and pt was not in her room. PT got assistance from several staff members and daughter via phone to look for pt, but was unable to locate her. Pt will be returned to cancellation list and possibly seen by another PT this week as primary PT is off.

## 2023-10-11 ASSESSMENT — ENCOUNTER SYMPTOMS
BOWEL PATTERN NORMAL: 1
STOOL FREQUENCY: DAILY
LAST BOWEL MOVEMENT: 66756

## 2023-10-12 ENCOUNTER — HOME CARE VISIT (OUTPATIENT)
Dept: HOME HEALTH SERVICES | Facility: HOME HEALTHCARE | Age: 83
End: 2023-10-12
Payer: MEDICARE

## 2023-10-12 VITALS
DIASTOLIC BLOOD PRESSURE: 70 MMHG | OXYGEN SATURATION: 97 % | HEART RATE: 78 BPM | RESPIRATION RATE: 18 BRPM | SYSTOLIC BLOOD PRESSURE: 128 MMHG | TEMPERATURE: 97.4 F

## 2023-10-12 PROCEDURE — G0299 HHS/HOSPICE OF RN EA 15 MIN: HCPCS

## 2023-10-12 ASSESSMENT — ENCOUNTER SYMPTOMS
FATIGUES EASILY: 1
LIMITED RANGE OF MOTION: 1
STOOL FREQUENCY: LESS THAN DAILY
DENIES PAIN: 1
BOWEL PATTERN NORMAL: 1
PERSON REPORTING PAIN: PATIENT
LOWER EXTREMITY EDEMA: 1
DRY SKIN: 1
LAST BOWEL MOVEMENT: 66758
MUSCLE WEAKNESS: 1

## 2023-10-12 NOTE — Clinical Note
alert and oriented x3, forgetful. states she is just waiting for the nurse to come. denies pain. reports no new medication. daughter managed medications. wound care done. no dressing noted . wound scabbing. reviewed teachings on  pressure ulcer prevention. states she is feeling fine .provided more sacral dressings. . lower extremities dry ,flaky , moisturizer applied. reenforced bleeding precautions and fall prevention. pt said she will go to dining area using motorized wheelchair.. Pt/Cg response to the services provided.. denies chest pains/palpitations, breathing problems, bleeding or falls..

## 2023-10-13 NOTE — CASE COMMUNICATION
noted  ----- Message -----  From: Kelly Caba R.N.  Sent: 10/12/2023   7:17 PM PDT  To: Chela Mohamud R.N.; Sneha Medina R.N.      alert and oriented x3, forgetful. states she is just waiting for the nurse to come. denies pain. reports no new medication. daughter managed medications. wound care done. no dressing noted . wound scabbing. reviewed teachings on  pressure ulcer prevention. states she is feeling fine .provided more sa cral dressings. . lower extremities dry ,flaky , moisturizer applied. reenforced bleeding precautions and fall prevention. pt said she will go to dining area using motorized wheelchair.. Pt/Cg response to the services provided.. denies chest pains/palpitations, breathing problems, bleeding or falls..

## 2023-10-16 ENCOUNTER — HOME CARE VISIT (OUTPATIENT)
Dept: HOME HEALTH SERVICES | Facility: HOME HEALTHCARE | Age: 83
End: 2023-10-16
Payer: MEDICARE

## 2023-10-16 VITALS
SYSTOLIC BLOOD PRESSURE: 118 MMHG | DIASTOLIC BLOOD PRESSURE: 62 MMHG | RESPIRATION RATE: 18 BRPM | OXYGEN SATURATION: 94 % | HEART RATE: 82 BPM | TEMPERATURE: 98 F

## 2023-10-16 PROCEDURE — G0299 HHS/HOSPICE OF RN EA 15 MIN: HCPCS

## 2023-10-16 ASSESSMENT — PAIN SCALES - PAIN ASSESSMENT IN ADVANCED DEMENTIA (PAINAD)
BODYLANGUAGE: 0 - RELAXED.
NEGVOCALIZATION: 0 - NONE.
FACIALEXPRESSION: 0 - SMILING OR INEXPRESSIVE.
CONSOLABILITY: 0 - NO NEED TO CONSOLE.
TOTALSCORE: 0

## 2023-10-16 ASSESSMENT — ENCOUNTER SYMPTOMS
LAST BOWEL MOVEMENT: 66763
LOWER EXTREMITY EDEMA: 1
STOOL FREQUENCY: DAILY
MUSCLE WEAKNESS: 1
BOWEL PATTERN NORMAL: 1
DRY SKIN: 1
DENIES PAIN: 1
LIMITED RANGE OF MOTION: 1
FATIGUES EASILY: 1

## 2023-10-17 ASSESSMENT — ENCOUNTER SYMPTOMS: PERSON REPORTING PAIN: PATIENT

## 2023-10-19 ENCOUNTER — HOME CARE VISIT (OUTPATIENT)
Dept: HOME HEALTH SERVICES | Facility: HOME HEALTHCARE | Age: 83
End: 2023-10-19
Payer: MEDICARE

## 2023-10-19 PROCEDURE — G0495 RN CARE TRAIN/EDU IN HH: HCPCS

## 2023-10-19 ASSESSMENT — ENCOUNTER SYMPTOMS
APPETITE LEVEL: GOOD
FATIGUES EASILY: 1
LOWER EXTREMITY EDEMA: 1
DRY SKIN: 1
LAST BOWEL MOVEMENT: 66765
BOWEL PATTERN NORMAL: 1
DENIES PAIN: 1
CHANGE IN APPETITE: UNCHANGED
PERSON REPORTING PAIN: PATIENT

## 2023-10-19 ASSESSMENT — ACTIVITIES OF DAILY LIVING (ADL): OASIS_M1830: 01

## 2023-10-24 ENCOUNTER — HOME CARE VISIT (OUTPATIENT)
Dept: HOME HEALTH SERVICES | Facility: HOME HEALTHCARE | Age: 83
End: 2023-10-24
Payer: MEDICARE

## 2023-10-24 ASSESSMENT — ACTIVITIES OF DAILY LIVING (ADL): HOME_HEALTH_OASIS: 01

## 2023-10-24 NOTE — CASE COMMUNICATION
Quality Review for OR OASIS by HERMANN Orellana, RN on  October 24, 2023     Edits completed by HERMANN Orellana, DANNY:  1.  E changed to No and F changed to yes per the plan of care  2.  unchecked antipsychotic per MAR  3. Changed Type of assistance for ADL assistance to non agency cg currently assist  4. , added EHR as all Home Health patients have access to [employer]

## 2023-10-30 ENCOUNTER — OFFICE VISIT (OUTPATIENT)
Dept: MEDICAL GROUP | Facility: PHYSICIAN GROUP | Age: 83
End: 2023-10-30
Payer: MEDICARE

## 2023-10-30 ENCOUNTER — HOSPITAL ENCOUNTER (OUTPATIENT)
Facility: MEDICAL CENTER | Age: 83
End: 2023-10-30
Attending: NURSE PRACTITIONER
Payer: MEDICARE

## 2023-10-30 VITALS
TEMPERATURE: 97.2 F | WEIGHT: 185 LBS | HEART RATE: 82 BPM | DIASTOLIC BLOOD PRESSURE: 60 MMHG | BODY MASS INDEX: 31.58 KG/M2 | OXYGEN SATURATION: 92 % | SYSTOLIC BLOOD PRESSURE: 110 MMHG | HEIGHT: 64 IN

## 2023-10-30 DIAGNOSIS — I48.19 PERSISTENT ATRIAL FIBRILLATION (HCC): ICD-10-CM

## 2023-10-30 DIAGNOSIS — R31.9 HEMATURIA, UNSPECIFIED TYPE: ICD-10-CM

## 2023-10-30 DIAGNOSIS — N18.2 STAGE 2 CHRONIC KIDNEY DISEASE: ICD-10-CM

## 2023-10-30 DIAGNOSIS — R73.01 ELEVATED FASTING GLUCOSE: ICD-10-CM

## 2023-10-30 LAB
AMBIGUOUS DTTM AMBI4: NORMAL
APPEARANCE UR: CLEAR
BILIRUB UR STRIP-MCNC: NEGATIVE MG/DL
COLOR UR AUTO: YELLOW
GLUCOSE UR STRIP.AUTO-MCNC: NEGATIVE MG/DL
KETONES UR STRIP.AUTO-MCNC: NORMAL MG/DL
LEUKOCYTE ESTERASE UR QL STRIP.AUTO: NEGATIVE
NITRITE UR QL STRIP.AUTO: NEGATIVE
PH UR STRIP.AUTO: 5.5 [PH] (ref 5–8)
PROT UR QL STRIP: NEGATIVE MG/DL
RBC UR QL AUTO: NEGATIVE
SP GR UR STRIP.AUTO: >=1.03
UROBILINOGEN UR STRIP-MCNC: 0.2 MG/DL

## 2023-10-30 PROCEDURE — 81002 URINALYSIS NONAUTO W/O SCOPE: CPT | Performed by: NURSE PRACTITIONER

## 2023-10-30 PROCEDURE — 99214 OFFICE O/P EST MOD 30 MIN: CPT | Performed by: NURSE PRACTITIONER

## 2023-10-30 PROCEDURE — 87077 CULTURE AEROBIC IDENTIFY: CPT

## 2023-10-30 PROCEDURE — 3074F SYST BP LT 130 MM HG: CPT | Performed by: NURSE PRACTITIONER

## 2023-10-30 PROCEDURE — 3078F DIAST BP <80 MM HG: CPT | Performed by: NURSE PRACTITIONER

## 2023-10-30 PROCEDURE — 87086 URINE CULTURE/COLONY COUNT: CPT

## 2023-10-30 ASSESSMENT — FIBROSIS 4 INDEX: FIB4 SCORE: 2.22

## 2023-10-30 NOTE — PROGRESS NOTES
"Subjective:     CC: blood in urine     HPI:   Donte presents today with her daughter for the following:    Hematuria   The hematuria started last week. The hematuria lasted 2-3 days. The hematuria resolved 1 day ago. The urine was tinged pink/red color. Aggravating factors include none. Alleviating factors include none. Interventions tried include increased water intake. Currently on Xarelto for persistent atrial fibrillation. Before she increased her water intake she was drinking a few glasses a day. Denies fever, chills, dysuria, urinary frequency, pressure, nausea or vomiting.     Stage 2 chronic kidney disease  Recent GFR 60. On lisinopril.     Persistent atrial fibrillation (HCC)  Continues atenolol 50 mg every evening and xarelto 20 mg with evening meal. Recent hematuria for 2-3 days that has resolved.       Past Medical History:   Diagnosis Date    A-fib (HCC)     Anesthesia     \"Tachycardia for 5 days after cataract surgery\"    Anticoagulant long-term use 1/12/2012    Arthritis     osteo-Knees, hips    Atrial fibrillation (HCC)     Backpain     R hip    Bowel habit changes     diarrhea    Breath shortness     with exertion, prn O2 2L    Bronchitis Nov, 2013    CAD (coronary artery disease)     Depression     Glaucoma 5/3/2011    Hematoma complicating a procedure 11/3/2012    Hemorrhagic disorder (HCC)     bruising/coumadin    Hypertension     Hypothyroid     Lupus (HCC)     Macular degeneration     Menopause 1/12/2012    Mitral regurgitation 10/30/2012    Obesity 1/12/2012    Pacemaker 2018    Pneumonia feb,2013    Pre-syncope 6/29/2018    Pulmonary hypertension (HCC) 10/30/2012    PVC's (premature ventricular contractions) 1/12/2012    Senile nuclear sclerosis     Spinal stenosis of lumbar region at multiple levels     Unspecified cataract     repaired bilateral    Unspecified urinary incontinence     Urinary bladder disorder        Social History     Tobacco Use    Smoking status: Never    Smokeless tobacco: " Never   Vaping Use    Vaping Use: Never used   Substance Use Topics    Alcohol use: No    Drug use: No       Current Outpatient Medications Ordered in Epic   Medication Sig Dispense Refill    levothyroxine (SYNTHROID) 50 MCG Tab Take 1 Tablet by mouth every morning on an empty stomach. Indications: Underactive Thyroid 90 Tablet 3    calcium carbonate (TUMS) 500 MG Chew Tab Chew 1,000 mg 3 times a day as needed (heartburn). Indications: Heartburn      diclofenac sodium (VOLTAREN) 1 % Gel Apply 2 g topically 2 times a day as needed (left hip pain). Indications: pain      rivaroxaban (XARELTO) 20 MG Tab tablet Take 1 Tablet by mouth with dinner. 100 Tablet 3    sertraline (ZOLOFT) 50 MG Tab Take 1 Tablet by mouth every day. 90 Tablet 3    lisinopril (PRINIVIL) 5 MG Tab Take 1 Tablet by mouth every day. 100 Tablet 3    atenolol (TENORMIN) 50 MG Tab Take 1 Tablet by mouth every evening. 100 Tablet 3    furosemide (LASIX) 40 MG Tab Take 1 Tablet by mouth 2 times a day. (Patient taking differently: Take 40 mg by mouth 1 time a day as needed (edema). Indications: Edema) 200 Tablet 3    PREBIOTIC PRODUCT PO Take 2 Tablets by mouth every day. Indications: Suppliment      magnesium oxide (MAG-OX) 400 MG Tab tablet Take 1 Tablet by mouth every day. 90 Tablet 3    Mirabegron ER (MYRBETRIQ) 50 MG TABLET SR 24 HR Take 50 mg by mouth every day. 90 Tablet 3    Sennosides (SENOKOT PO) Take 1 Tablet by mouth 1 time a day as needed (constipation). Indications: constipation      Acetaminophen 500 MG Cap Take 2 Caps by mouth 3 times a day as needed. Indications: Pain      Lutein 20 MG Cap Take 1 Cap by mouth every day. 90 Cap 3    Cholecalciferol (VITAMIN D) 2000 UNIT Tab Take 2,000 Units by mouth every day.      non-formulary med Take 1 Lozenge by mouth as needed (dry mouth). ACT dry mouth lozenges  Indications: dry mouth (Patient not taking: Reported on 10/30/2023)       No current Psychiatric-ordered facility-administered medications on  "file.       Allergies:  Amiodarone, Bactrim, Cipro xr, Metoprolol, Morphine, Phytoplex z-guard [petrolatum-zinc oxide], Pseudoephedrine, Qvar [beclomethasone dipropionate], Vibramycin, Atorvastatin calcium-polysorbate 80, Augmentin, Diltiazem, Flecainide, Keflex, Mucinex, Tramadol, Atorvastatin, and Tape    Health Maintenance: Reviewed       Objective:     Vital signs reviewed  Exam:  /60 (BP Location: Right arm, Patient Position: Sitting, BP Cuff Size: Adult)   Pulse 82   Temp 36.2 °C (97.2 °F) (Temporal)   Ht 1.626 m (5' 4\")   Wt 83.9 kg (185 lb)   LMP 01/10/1975   SpO2 92%   BMI 31.76 kg/m²  Body mass index is 31.76 kg/m².    Gen: Alert and oriented, No apparent distress. Daughter present.   Neck: Neck is supple without lymphadenopathy.  Lungs: Normal effort, CTA bilaterally, no wheezes, rhonchi, or rales. No CVA tenderness.   CV: Regular rate and rhythm. No murmurs, rubs, or gallops.  Ext: No clubbing, cyanosis, edema.      Labs:      Latest Reference Range & Units 10/30/23 16:34   POC Color Negative  yellow   POC Appearance Negative  clear   POC Specific Gravity <1.005 - >1.030  >=1.030   POC Urine PH 5.0 - 8.0  5.5   POC Glucose Negative mg/dL negative   POC Ketones Negative mg/dL trace   POC Protein Negative mg/dL negative   POC Nitrites Negative  negative   POC Leukocyte Esterase Negative  negative   POC Blood Negative  negative   POC Bilirubin Negative mg/dL negative   POC Urobiligen Negative (0.2) mg/dL 0.2       Assessment & Plan:     82 y.o. female with the following -     1. Hematuria, unspecified type  Acute uncomplicated problem.  No hematuria seen on urinalysis today.  We will culture the urine.  Discussed that may be related to her Xarelto but as of today no signs of hematuria.  - POCT Urinalysis  - URINE CULTURE(NEW); Future    2. Persistent atrial fibrillation (HCC)  Chronic stable problem.  Continue atenolol 50 mg every evening and Xarelto 20 mg every evening.    3. Elevated fasting " glucose  Chronic stable problem.  Check A1c.  Recent labs showed a glucose of 124 and per daughter patient was not fasting even though she told the  she was fasting.  - HEMOGLOBIN A1C; Future    4. Stage 2 chronic kidney disease  Chronic stable problem.  Recent kidney function WNL.  See 1 above.      Return if symptoms worsen or fail to improve.    Please note that this dictation was created using voice recognition software. I have made every reasonable attempt to correct obvious errors, but I expect that there are errors of grammar and possibly content that I did not discover before finalizing the note.

## 2023-10-30 NOTE — ASSESSMENT & PLAN NOTE
Continues atenolol 50 mg every evening and xarelto 20 mg with evening meal. Recent hematuria for 2-3 days that has resolved.

## 2023-11-02 LAB
BACTERIA UR CULT: NORMAL
SIGNIFICANT IND 70042: NORMAL
SITE SITE: NORMAL
SOURCE SOURCE: NORMAL

## 2023-11-03 NOTE — CASE COMMUNICATION
I agree with changes  ----- Message -----  From: Tonya Orellana R.N.  Sent: 10/24/2023  10:50 AM PDT  To: Kelly Caba R.N.         Quality Review for DC OASIS by HERMANN Orellana, RN on  October 24, 2023     Edits completed by HERMANN Orellana, RN:  1.  E changed to No and F changed to yes per the plan of care  2.  unchecked antipsychotic per MAR  3. Changed Type of assistance for ADL assistance to non agency cg currently  assist  4. , added EHR as all Home Health patients have access to [employer]

## 2023-11-06 ENCOUNTER — APPOINTMENT (OUTPATIENT)
Dept: MEDICAL GROUP | Facility: PHYSICIAN GROUP | Age: 83
End: 2023-11-06
Payer: MEDICARE

## 2023-11-09 ENCOUNTER — APPOINTMENT (RX ONLY)
Dept: URBAN - METROPOLITAN AREA CLINIC 6 | Facility: CLINIC | Age: 83
Setting detail: DERMATOLOGY
End: 2023-11-09

## 2023-11-09 DIAGNOSIS — L82.0 INFLAMED SEBORRHEIC KERATOSIS: ICD-10-CM

## 2023-11-09 DIAGNOSIS — L57.0 ACTINIC KERATOSIS: ICD-10-CM

## 2023-11-09 PROCEDURE — 17110 DESTRUCTION B9 LES UP TO 14: CPT

## 2023-11-09 PROCEDURE — 17003 DESTRUCT PREMALG LES 2-14: CPT | Mod: 59

## 2023-11-09 PROCEDURE — 17000 DESTRUCT PREMALG LESION: CPT | Mod: 59

## 2023-11-09 PROCEDURE — ? DIAGNOSIS COMMENT

## 2023-11-09 PROCEDURE — ? LIQUID NITROGEN

## 2023-11-09 PROCEDURE — ? COUNSELING

## 2023-11-09 ASSESSMENT — LOCATION ZONE DERM
LOCATION ZONE: FACE
LOCATION ZONE: TRUNK
LOCATION ZONE: NECK

## 2023-11-09 ASSESSMENT — LOCATION DETAILED DESCRIPTION DERM
LOCATION DETAILED: LEFT CLAVICULAR NECK
LOCATION DETAILED: LEFT SUPERIOR UPPER BACK
LOCATION DETAILED: RIGHT INFERIOR CENTRAL MALAR CHEEK
LOCATION DETAILED: RIGHT CENTRAL MALAR CHEEK

## 2023-11-09 ASSESSMENT — LOCATION SIMPLE DESCRIPTION DERM
LOCATION SIMPLE: LEFT UPPER BACK
LOCATION SIMPLE: LEFT ANTERIOR NECK
LOCATION SIMPLE: RIGHT CHEEK

## 2023-11-09 NOTE — PROCEDURE: LIQUID NITROGEN
Show Aperture Variable?: Yes
Consent: The patient's consent was obtained including but not limited to risks of crusting, scabbing, blistering, scarring, darker or lighter pigmentary change, recurrence, incomplete removal and infection.
Render Post-Care Instructions In Note?: no
Detail Level: Detailed
Number Of Freeze-Thaw Cycles: 3 freeze-thaw cycles
Duration Of Freeze Thaw-Cycle (Seconds): 5
Post-Care Instructions: I reviewed with the patient in detail post-care instructions. Patient is to wear sunprotection, and avoid picking at any of the treated lesions. Pt may apply Vaseline to crusted or scabbing areas.
Application Tool (Optional): Liquid Nitrogen Sprayer
Medical Necessity Information: It is in your best interest to select a reason for this procedure from the list below. All of these items fulfill various CMS LCD requirements except the new and changing color options.
Medical Necessity Clause: This procedure was medically necessary because the lesions that were treated were:
Spray Paint Text: The liquid nitrogen was applied to the skin utilizing a spray paint frosting technique.
Duration Of Freeze Thaw-Cycle (Seconds): 10

## 2023-12-19 DIAGNOSIS — R32 URINARY INCONTINENCE, UNSPECIFIED TYPE: ICD-10-CM

## 2023-12-19 DIAGNOSIS — F32.0 CURRENT MILD EPISODE OF MAJOR DEPRESSIVE DISORDER WITHOUT PRIOR EPISODE (HCC): ICD-10-CM

## 2023-12-19 RX ORDER — MIRABEGRON 50 MG/1
50 TABLET, FILM COATED, EXTENDED RELEASE ORAL DAILY
Qty: 90 TABLET | Refills: 0 | Status: SHIPPED | OUTPATIENT
Start: 2023-12-19

## 2023-12-19 NOTE — TELEPHONE ENCOUNTER
Requested Prescriptions     Pending Prescriptions Disp Refills    Mirabegron ER (MYRBETRIQ) 50 MG TABLET SR 24 HR 90 Tablet 0     Sig: Take 50 mg by mouth every day. Indications: Urinary Incontinence    sertraline (ZOLOFT) 50 MG Tab 90 Tablet 0     Sig: Take 1 Tablet by mouth every day. Indications: Major Depressive Disorder

## 2024-01-01 ENCOUNTER — TELEPHONE (OUTPATIENT)
Dept: HEALTH INFORMATION MANAGEMENT | Facility: OTHER | Age: 84
End: 2024-01-01

## 2024-01-01 DIAGNOSIS — F32.0 CURRENT MILD EPISODE OF MAJOR DEPRESSIVE DISORDER WITHOUT PRIOR EPISODE (HCC): ICD-10-CM

## 2024-01-08 ENCOUNTER — APPOINTMENT (OUTPATIENT)
Dept: MEDICAL GROUP | Facility: MEDICAL CENTER | Age: 84
End: 2024-01-08
Payer: MEDICARE

## 2024-01-16 ENCOUNTER — OFFICE VISIT (OUTPATIENT)
Dept: MEDICAL GROUP | Facility: MEDICAL CENTER | Age: 84
End: 2024-01-16
Payer: MEDICARE

## 2024-01-16 ENCOUNTER — HOME HEALTH ADMISSION (OUTPATIENT)
Dept: HOME HEALTH SERVICES | Facility: HOME HEALTHCARE | Age: 84
End: 2024-01-16
Payer: MEDICARE

## 2024-01-16 VITALS
WEIGHT: 182 LBS | SYSTOLIC BLOOD PRESSURE: 115 MMHG | BODY MASS INDEX: 31.07 KG/M2 | RESPIRATION RATE: 16 BRPM | TEMPERATURE: 97.4 F | DIASTOLIC BLOOD PRESSURE: 52 MMHG | OXYGEN SATURATION: 99 % | HEART RATE: 92 BPM | HEIGHT: 64 IN

## 2024-01-16 DIAGNOSIS — Z00.00 ENCOUNTER FOR MEDICAL EXAMINATION TO ESTABLISH CARE: ICD-10-CM

## 2024-01-16 DIAGNOSIS — N18.2 STAGE 2 CHRONIC KIDNEY DISEASE: ICD-10-CM

## 2024-01-16 DIAGNOSIS — I73.9 PVD (PERIPHERAL VASCULAR DISEASE) (HCC): ICD-10-CM

## 2024-01-16 DIAGNOSIS — I10 ESSENTIAL HYPERTENSION: ICD-10-CM

## 2024-01-16 DIAGNOSIS — E03.9 ACQUIRED HYPOTHYROIDISM: ICD-10-CM

## 2024-01-16 DIAGNOSIS — I48.19 PERSISTENT ATRIAL FIBRILLATION (HCC): ICD-10-CM

## 2024-01-16 DIAGNOSIS — F33.0 MILD EPISODE OF RECURRENT MAJOR DEPRESSIVE DISORDER (HCC): ICD-10-CM

## 2024-01-16 DIAGNOSIS — M32.9 PERSONAL HISTORY OF SYSTEMIC LUPUS ERYTHEMATOSUS (SLE) (HCC): ICD-10-CM

## 2024-01-16 DIAGNOSIS — L89.152 PRESSURE INJURY OF SACRAL REGION, STAGE 2 (HCC): ICD-10-CM

## 2024-01-16 PROBLEM — D68.69 OTHER THROMBOPHILIA (HCC): Status: RESOLVED | Noted: 2023-09-18 | Resolved: 2024-01-16

## 2024-01-16 PROCEDURE — 99214 OFFICE O/P EST MOD 30 MIN: CPT | Performed by: STUDENT IN AN ORGANIZED HEALTH CARE EDUCATION/TRAINING PROGRAM

## 2024-01-16 PROCEDURE — 3074F SYST BP LT 130 MM HG: CPT | Performed by: STUDENT IN AN ORGANIZED HEALTH CARE EDUCATION/TRAINING PROGRAM

## 2024-01-16 PROCEDURE — 3078F DIAST BP <80 MM HG: CPT | Performed by: STUDENT IN AN ORGANIZED HEALTH CARE EDUCATION/TRAINING PROGRAM

## 2024-01-16 ASSESSMENT — FIBROSIS 4 INDEX: FIB4 SCORE: 2.25

## 2024-01-17 NOTE — PROGRESS NOTES
"Subjective:     CC:  Diagnoses of Encounter for medical examination to establish care, Persistent atrial fibrillation (HCC), Personal history of systemic lupus erythematosus (SLE) (HCC), Essential hypertension, Acquired hypothyroidism, PVD (peripheral vascular disease) (HCC), Mild episode of recurrent major depressive disorder (HCC), Stage 2 chronic kidney disease, and Pressure injury of sacral region, stage 2 (HCC) were pertinent to this visit.    HISTORY OF THE PRESENT ILLNESS: Patient is a 83 y.o. female. This pleasant patient is here today to establish care and discuss the following    Problem   Pressure Injury of Sacral Region, Stage 2 (Hcc)    Chronic, recurrent condition.  Currently active, requesting wound care     Pvd (Peripheral Vascular Disease) (Hcc)    Chronic condition     Essential Hypertension    Chronic condition, currently on atenolol 50 mg daily and lisinopril 5 mg daily     Stage 2 Chronic Kidney Disease    Chronic condition, currently on lisinopril     Persistent Atrial Fibrillation (Hcc)    Chronic condition, follows with cardiology, currently on atenolol and Xarelto     Mild Episode of Recurrent Major Depressive Disorder (Hcc)    Chronic condition, currently well-controlled with Zoloft 50 mg daily     Hypothyroidism    Chronic condition, currently on levothyroxine 50 mcg daily, labs WNL     Personal History of Systemic Lupus Erythematosus (Sle) (Hcc)    Personal history, asymptomatic, not on any medications     Other Thrombophilia (Hcc) (Resolved)     ROS:   ROS      Objective:       Exam: /52   Pulse 92   Temp 36.3 °C (97.4 °F) (Temporal)   Resp 16   Ht 1.626 m (5' 4\")   Wt 82.6 kg (182 lb)   SpO2 99%  Body mass index is 31.24 kg/m².    Physical Exam  Vitals reviewed.   Constitutional:       General: She is not in acute distress.     Appearance: She is not toxic-appearing.   HENT:      Head: Normocephalic and atraumatic.      Right Ear: External ear normal.      Left Ear: External " ear normal.   Eyes:      General:         Right eye: No discharge.         Left eye: No discharge.      Extraocular Movements: Extraocular movements intact.      Conjunctiva/sclera: Conjunctivae normal.   Cardiovascular:      Rate and Rhythm: Normal rate and regular rhythm.      Heart sounds: Normal heart sounds. No murmur heard.  Pulmonary:      Effort: Pulmonary effort is normal. No respiratory distress.      Breath sounds: Normal breath sounds. No wheezing or rales.   Skin:     General: Skin is warm and dry.      Comments: Wounds bilaterally on the buttocks, worse on the right   Neurological:      Mental Status: She is alert.   Psychiatric:         Mood and Affect: Mood normal.         Behavior: Behavior normal.         Thought Content: Thought content normal.         Judgment: Judgment normal.             Assessment & Plan:   83 y.o. female with the following -    1. Encounter for medical examination to establish care      2. Persistent atrial fibrillation (HCC)  Continue atenolol and Xarelto    3. Personal history of systemic lupus erythematosus (SLE) (HCC)  Asymptomatic, no medication needed    4. Essential hypertension  Well-controlled, continue lisinopril and atenolol    5. Acquired hypothyroidism  Continue levothyroxine at current dosing    6. PVD (peripheral vascular disease) ([facility])  Continue to manage blood pressure    7. Mild episode of recurrent major depressive disorder (HCC)  Continue Zoloft at current dosing    8. Stage 2 chronic kidney disease  Continue lisinopril at current dosing    9. Pressure injury of sacral region, stage 2 ([facility])  Referral to wound care through home health  - Referral to Home Health      [facility] Form    Last edited 01/16/24 15:52 PST by Jolie Escobar M.D.         No follow-ups on file.    Please note that this dictation was created using voice recognition software. I have made every reasonable attempt to correct obvious errors, but I expect that there are errors of grammar  and possibly content that I did not discover before finalizing the note.

## 2024-01-18 ENCOUNTER — DOCUMENTATION (OUTPATIENT)
Dept: MEDICAL GROUP | Facility: PHYSICIAN GROUP | Age: 84
End: 2024-01-18
Payer: MEDICARE

## 2024-01-18 ENCOUNTER — HOME CARE VISIT (OUTPATIENT)
Dept: HOME HEALTH SERVICES | Facility: HOME HEALTHCARE | Age: 84
End: 2024-01-18
Payer: MEDICARE

## 2024-01-18 VITALS
WEIGHT: 182 LBS | DIASTOLIC BLOOD PRESSURE: 60 MMHG | OXYGEN SATURATION: 96 % | HEART RATE: 83 BPM | TEMPERATURE: 98 F | SYSTOLIC BLOOD PRESSURE: 108 MMHG | BODY MASS INDEX: 31.24 KG/M2 | RESPIRATION RATE: 16 BRPM

## 2024-01-18 PROCEDURE — 665998 HH PPS REVENUE CREDIT

## 2024-01-18 PROCEDURE — 665999 HH PPS REVENUE DEBIT

## 2024-01-18 PROCEDURE — G0180 MD CERTIFICATION HHA PATIENT: HCPCS | Performed by: STUDENT IN AN ORGANIZED HEALTH CARE EDUCATION/TRAINING PROGRAM

## 2024-01-18 PROCEDURE — 665001 SOC-HOME HEALTH

## 2024-01-18 PROCEDURE — 665005 NO-PAY RAP - HOME HEALTH

## 2024-01-18 PROCEDURE — G0299 HHS/HOSPICE OF RN EA 15 MIN: HCPCS

## 2024-01-18 RX ORDER — SPIRONOLACTONE 25 MG
1 TABLET ORAL DAILY
Qty: 90 CAPSULE | Refills: 3 | Status: SHIPPED | OUTPATIENT
Start: 2024-01-18

## 2024-01-18 ASSESSMENT — ENCOUNTER SYMPTOMS
VOMITING: PT DENIES ANY EMESIS AT THIS TIME
FORGETFULNESS: 1
SUBJECTIVE PAIN PROGRESSION: WAXING AND WANING
PAIN LOCATION - PAIN SEVERITY: 6/10
NAUSEA: PT DENIES ANY NAUSEA AT THIS TIME
PAIN SEVERITY GOAL: 0/10
PAIN LOCATION - RELIEVING FACTORS: REST/MEDICATION
LAST BOWEL MOVEMENT: 66856
PAIN LOCATION - EXACERBATING FACTORS: POSITION, ACTIVITY
PAIN: 1
PERSON REPORTING PAIN: PATIENT
SHORTNESS OF BREATH: 1
DYSPNEA ON EXERTION: 1
HIGHEST PAIN SEVERITY IN PAST 24 HOURS: 6/10
STOOL FREQUENCY: DAILY
LOWEST PAIN SEVERITY IN PAST 24 HOURS: 0/10
PAIN LOCATION: SACCRUM
PAIN LOCATION - PAIN FREQUENCY: FREQUENT
DYSPNEA ACTIVITY LEVEL: AFTER AMBULATING 10 - 20 FT
FATIGUES EASILY: 1
BOWEL PATTERN NORMAL: 1
DRY SKIN: 1

## 2024-01-18 ASSESSMENT — ACTIVITIES OF DAILY LIVING (ADL): OASIS_M1830: 03

## 2024-01-18 ASSESSMENT — FIBROSIS 4 INDEX: FIB4 SCORE: 2.25

## 2024-01-18 NOTE — PROGRESS NOTES
Medication chart review for [facility] services    Received referral from [facility].   Medications reviewed  compared with discharge summary if available.  Discharge summary date, if applicable:   N/a    Current medication list per [facility]     Medication list one, patient is currently taking    Current Outpatient Medications:     Lidocaine, 1 Spray, Topical, TID PRN    diphenhydrAMINE, 50 mg, Oral, HS PRN    ipratropium, 2 Spray, Nasal, DAILY    Fluticasone Propionate (Inhal), 2 Spray, Nasal, DAILY AT 1800    trolamine salicylate, 1 Application, Topical, 4X/DAY PRN    DIPHENHYDRAMINE HCL (TOPICAL), 1 Application, Topical, 4X/DAY PRN    Neomycin-Bacitracin-Polymyxin (HCA TRIPLE ANTIBIOTIC OINTMENT EX), 1 Application, Topical, TID PRN    Lutein, 1 Capsule, Oral, DAILY    Myrbetriq, 50 mg, Oral, DAILY    sertraline, 50 mg, Oral, QDAY    levothyroxine, 50 mcg, Oral, AM ES    calcium carbonate, 1,000 mg, Oral, TID PRN    diclofenac sodium, 2 g, Topical, BID PRN    non-formulary med, 1 Lozenge, Oral, PRN (Patient not taking: Reported on 10/30/2023)    rivaroxaban, 20 mg, Oral, PM MEAL    lisinopril, 5 mg, Oral, QDAY    atenolol, 50 mg, Oral, Q EVENING    furosemide, 40 mg, Oral, BID (Patient taking differently: 40 mg, Oral, 1 TIME DAILY PRN, edema, Indications: Edema)    PREBIOTIC PRODUCT PO, 2 Tablet, Oral, DAILY    magnesium oxide, 400 mg, Oral, QDAY    Sennosides (SENOKOT PO), 1 Tablet, Oral, QDAY PRN    Acetaminophen, 2 Capsule, Oral, TID PRN    vitamin D, 5,000 Units, Oral, DAILY      Medication list two, drugs that the patient has been prescribed or recommended to take by their healthcare provider on discharge summary  N/a    Allergies   Allergen Reactions    Amiodarone Hives     Throat and tongue itching    Bactrim Shortness of Breath    Cipro Xr Swelling    Metoprolol Swelling     Causes throat swelling    Morphine Unspecified     Hallucinations    Phytoplex Z-Guard [Petrolatum-Zinc Oxide]  "Unspecified     \"causes burning\"    Pseudoephedrine Palpitations    Qvar [Beclomethasone Dipropionate] Unspecified     Pressure on heart      Vibramycin Shortness of Breath    Atorvastatin Calcium-Polysorbate 80 Unspecified     Muscle aches      Augmentin Unspecified     Unknown reaction    Diltiazem Rash     rash    Flecainide Unspecified     dizziness    Keflex Unspecified     Pt states \"Unsure\".    Mucinex Unspecified     GI Distress      Tramadol Unspecified     crying    Atorvastatin Myalgia    Tape Rash     Paper tape okay       Labs     Lab Results   Component Value Date/Time    SODIUM 137 09/15/2023 01:47 PM    POTASSIUM 5.0 09/15/2023 01:47 PM    CHLORIDE 104 09/15/2023 01:47 PM    CO2 22 09/15/2023 01:47 PM    GLUCOSE 124 (H) 09/15/2023 01:47 PM    BUN 26 (H) 09/15/2023 01:47 PM    CREATININE 0.93 09/15/2023 01:47 PM    CREATININE 0.78 07/29/2010 12:00 AM    BUNCREATRAT 17 07/29/2010 12:00 AM    GLOMRATE >59 07/29/2010 12:00 AM     Lab Results   Component Value Date/Time    ALKPHOSPHAT 75 12/29/2022 04:52 AM    ASTSGOT 27 12/29/2022 04:52 AM    ALTSGPT 14 12/29/2022 04:52 AM    TBILIRUBIN 0.7 12/29/2022 04:52 AM    INR 0.99 01/16/2023 01:43 PM    ALBUMIN 3.5 12/29/2022 04:52 AM        Assessment for clinically significant drug interactions, drug omissions/additions, duplicative therapies.            CC   Jolie Escobar M.D.  [address]  [facility] [address]  Fax: [phone]    [facility]  Phone [phone] fax [phone]    This note was created using voice recognition software (Dragon). The accuracy of the dictation is limited by the abilities of the software. I have reviewed the note prior to signing, however some errors in grammar and context are still possible. If you have any questions related to this note please do not hesitate to contact our office.   "

## 2024-01-18 NOTE — Clinical Note
Medication reconciliation completed for start of care. Two major drug to drug interactions found:  Diclofenac Sodium and rivaroxaban   Diclofenac Sodium and sertraline    Thank you,  Tati Carson RN

## 2024-01-18 NOTE — Clinical Note
Primary dx/Skilled need: Stage 2 Pressure Ulcer to Sacrum  SN frequency:  2W6 3PRNS  Zip code: [address]  Disciplines ordered: NA  Insurance & authorization:  [facility]  Certification period: 01/18/24-3/17/24  Special consideration: Schedule visits with daughter Reina

## 2024-01-19 PROCEDURE — 665998 HH PPS REVENUE CREDIT

## 2024-01-19 PROCEDURE — 665999 HH PPS REVENUE DEBIT

## 2024-01-19 NOTE — CASE COMMUNICATION
noted  ----- Message -----  From: Tati Carson R.N.  Sent: 1/18/2024   2:09 PM PST  To: Chela Mohamud R.N.; Sneha Medina R.N.; *      Primary dx/Skilled need: Stage 2 Pressure Ulcer to Sacrum  SN frequency:  2W6 3PRNS  Zip code: [address]  Disciplines ordered: NA  Insurance & authorization:  [facility]  Certification period: 01/18/24-3/17/24  Special consideration: Schedule visits with daughter Reina

## 2024-01-20 PROCEDURE — 665999 HH PPS REVENUE DEBIT

## 2024-01-20 PROCEDURE — 665998 HH PPS REVENUE CREDIT

## 2024-01-21 PROCEDURE — 665999 HH PPS REVENUE DEBIT

## 2024-01-21 PROCEDURE — 665998 HH PPS REVENUE CREDIT

## 2024-01-22 ENCOUNTER — HOME CARE VISIT (OUTPATIENT)
Dept: HOME HEALTH SERVICES | Facility: HOME HEALTHCARE | Age: 84
End: 2024-01-22
Payer: MEDICARE

## 2024-01-22 VITALS
TEMPERATURE: 97.4 F | OXYGEN SATURATION: 92 % | DIASTOLIC BLOOD PRESSURE: 50 MMHG | HEART RATE: 71 BPM | SYSTOLIC BLOOD PRESSURE: 136 MMHG | RESPIRATION RATE: 18 BRPM

## 2024-01-22 PROCEDURE — 665999 HH PPS REVENUE DEBIT

## 2024-01-22 PROCEDURE — G0299 HHS/HOSPICE OF RN EA 15 MIN: HCPCS

## 2024-01-22 PROCEDURE — 665998 HH PPS REVENUE CREDIT

## 2024-01-22 ASSESSMENT — ENCOUNTER SYMPTOMS
PAIN LOCATION - PAIN QUALITY: SORE
HIGHEST PAIN SEVERITY IN PAST 24 HOURS: 5/10
FORGETFULNESS: 1
LOWEST PAIN SEVERITY IN PAST 24 HOURS: 0/10
PAIN LOCATION - PAIN SEVERITY: 0/10
PAIN LOCATION: BUTTOCKS
PAIN LOCATION - PAIN DURATION: ONGOING
DENIES PAIN: 1
PAIN LOCATION - PAIN FREQUENCY: INTERMITTENT
MUSCLE WEAKNESS: 1
BOWEL PATTERN NORMAL: 1
PAIN LOCATION - EXACERBATING FACTORS: SIT
SUBJECTIVE PAIN PROGRESSION: UNCHANGED
STOOL FREQUENCY: DAILY
LAST BOWEL MOVEMENT: 66860
PERSON REPORTING PAIN: PATIENT

## 2024-01-23 PROCEDURE — 665999 HH PPS REVENUE DEBIT

## 2024-01-23 PROCEDURE — 665998 HH PPS REVENUE CREDIT

## 2024-01-23 ASSESSMENT — ENCOUNTER SYMPTOMS
POOR JUDGMENT: 1
DESCRIPTION OF MEMORY LOSS: IMMEDIATE
DESCRIPTION OF MEMORY LOSS: SHORT TERM

## 2024-01-24 PROCEDURE — 665998 HH PPS REVENUE CREDIT

## 2024-01-24 PROCEDURE — 665999 HH PPS REVENUE DEBIT

## 2024-01-25 ENCOUNTER — HOME CARE VISIT (OUTPATIENT)
Dept: HOME HEALTH SERVICES | Facility: HOME HEALTHCARE | Age: 84
End: 2024-01-25
Payer: MEDICARE

## 2024-01-25 VITALS
HEART RATE: 84 BPM | RESPIRATION RATE: 17 BRPM | OXYGEN SATURATION: 94 % | SYSTOLIC BLOOD PRESSURE: 118 MMHG | TEMPERATURE: 98.1 F | DIASTOLIC BLOOD PRESSURE: 60 MMHG

## 2024-01-25 PROCEDURE — 665999 HH PPS REVENUE DEBIT

## 2024-01-25 PROCEDURE — G0299 HHS/HOSPICE OF RN EA 15 MIN: HCPCS

## 2024-01-25 PROCEDURE — 665998 HH PPS REVENUE CREDIT

## 2024-01-25 ASSESSMENT — ENCOUNTER SYMPTOMS
LOWEST PAIN SEVERITY IN PAST 24 HOURS: 2/10
HIGHEST PAIN SEVERITY IN PAST 24 HOURS: 4/10
PAIN LOCATION: BUTTOCKS
PAIN LOCATION - PAIN QUALITY: SORE
DRY SKIN: 1
MUSCLE WEAKNESS: 1
SUBJECTIVE PAIN PROGRESSION: WAXING AND WANING
LIMITED RANGE OF MOTION: 1
HYPERTENSION: 1
BOWEL PATTERN NORMAL: 1
PAIN SEVERITY GOAL: 0/10
DENIES PAIN: 1
LOWER EXTREMITY EDEMA: 1
PAIN LOCATION - RELIEVING FACTORS: PAIN MED
PERSON REPORTING PAIN: PATIENT
LAST BOWEL MOVEMENT: 66856
PAIN LOCATION - PAIN FREQUENCY: INTERMITTENT
STOOL FREQUENCY: LESS THAN DAILY
FATIGUES EASILY: 1

## 2024-01-26 PROCEDURE — 665999 HH PPS REVENUE DEBIT

## 2024-01-26 PROCEDURE — 665998 HH PPS REVENUE CREDIT

## 2024-01-27 PROCEDURE — 665998 HH PPS REVENUE CREDIT

## 2024-01-27 PROCEDURE — 665999 HH PPS REVENUE DEBIT

## 2024-01-28 PROCEDURE — 665998 HH PPS REVENUE CREDIT

## 2024-01-28 PROCEDURE — 665999 HH PPS REVENUE DEBIT

## 2024-01-29 ENCOUNTER — HOME CARE VISIT (OUTPATIENT)
Dept: HOME HEALTH SERVICES | Facility: HOME HEALTHCARE | Age: 84
End: 2024-01-29
Payer: MEDICARE

## 2024-01-29 VITALS
TEMPERATURE: 97.5 F | SYSTOLIC BLOOD PRESSURE: 126 MMHG | DIASTOLIC BLOOD PRESSURE: 70 MMHG | OXYGEN SATURATION: 97 % | RESPIRATION RATE: 16 BRPM | HEART RATE: 82 BPM

## 2024-01-29 PROCEDURE — 665998 HH PPS REVENUE CREDIT

## 2024-01-29 PROCEDURE — G0299 HHS/HOSPICE OF RN EA 15 MIN: HCPCS

## 2024-01-29 PROCEDURE — 665999 HH PPS REVENUE DEBIT

## 2024-01-29 ASSESSMENT — ENCOUNTER SYMPTOMS
POOR JUDGMENT: 1
PAIN LOCATION - PAIN QUALITY: ACHE
MUSCLE WEAKNESS: 1
SUBJECTIVE PAIN PROGRESSION: UNCHANGED
ARTHRALGIAS: 1
LOWEST PAIN SEVERITY IN PAST 24 HOURS: 0/10
HIGHEST PAIN SEVERITY IN PAST 24 HOURS: 5/10
STOOL FREQUENCY: DAILY
PAIN LOCATION - PAIN FREQUENCY: INTERMITTENT
DESCRIPTION OF MEMORY LOSS: IMMEDIATE
PAIN LOCATION - PAIN SEVERITY: 0/10
PAIN: 1
PAIN LOCATION - RELIEVING FACTORS: TYLENOL
PERSON REPORTING PAIN: PATIENT
DESCRIPTION OF MEMORY LOSS: SHORT TERM
DESCRIPTION OF MEMORY LOSS: LONG TERM
LAST BOWEL MOVEMENT: 66867
BOWEL PATTERN NORMAL: 1
PAIN SEVERITY GOAL: 0/10
PAIN LOCATION - EXACERBATING FACTORS: NOTHING
PAIN LOCATION - PAIN DURATION: ONGOING

## 2024-01-29 NOTE — CASE COMMUNICATION
Quality Review for 1.18.24 SOC OASIS performed on by KARISSA Smith RN on 1.29.2024:    Edits completed by KARISSA Smith RN:  1.  and  dx coding updated per chart review.   2. Changed  to 2,  to 2 and  to 4 per SNV on 1.22.24. Changed  to 1.22.24 per the OASIS collaboration convention  3. Changed flu question to no per response #4  4. Changed  to A1 to 2 per wound documentation.   5. Changed  D to yes, pt  has HTN  6. Changed , , ,  to 2,  to 3, [id number] C,F to 4, [id number] F,I to 4 per ADL tab reporting pt needs SBA for all ADLs.   7. Changed  to 3 per ambulation score   8. Added incontinence to functional limitations. Added ambulate only with assistance to safety measures. Added low Na diet to nutritional requirements  9.  Updated F2F data

## 2024-01-29 NOTE — Clinical Note
I agree with these changes  Tati Carson RN  ----- Message -----  From: Leandra Smith R.N.  Sent: 1/29/2024  10:19 AM PST  To: Tati Carson R.N.      Quality Review for 1.18.24 SOC OASIS performed on by KARISSA Smith RN on 1.29.2024:    Edits completed by KARISSA Smith RN:  1.  and  dx coding updated per chart review.   2. Changed  to 2,  to 2 and  to 4 per SNV on 1.22.24. Changed  to 1.22.24 per the OASIS collaboration convention  3. Changed flu question to no per response #4  4. Changed  to A1 to 2 per wound documentation.   5. Changed  D to yes, pt has HTN  6. Changed , , ,  to 2,  to 3, [id number] C,F to 4, [id number] F,I to 4 per ADL tab reporting pt needs SBA for all ADLs.   7. Changed  to 3 per ambulation score   8. Added incontinence to functional limitations. Added ambulate only with assistance to safety measures. Added low Na diet to nutritional requirements  9.  Updated F2F data

## 2024-01-30 DIAGNOSIS — I10 ESSENTIAL HYPERTENSION: ICD-10-CM

## 2024-01-30 PROCEDURE — 665998 HH PPS REVENUE CREDIT

## 2024-01-30 PROCEDURE — 665999 HH PPS REVENUE DEBIT

## 2024-01-31 ENCOUNTER — HOME CARE VISIT (OUTPATIENT)
Dept: HOME HEALTH SERVICES | Facility: HOME HEALTHCARE | Age: 84
End: 2024-01-31
Payer: MEDICARE

## 2024-01-31 PROCEDURE — 665999 HH PPS REVENUE DEBIT

## 2024-01-31 PROCEDURE — 665998 HH PPS REVENUE CREDIT

## 2024-01-31 RX ORDER — LISINOPRIL 5 MG/1
5 TABLET ORAL
Qty: 100 TABLET | Refills: 0 | Status: SHIPPED | OUTPATIENT
Start: 2024-01-31

## 2024-01-31 NOTE — TELEPHONE ENCOUNTER
Is the patient due for a refill? Yes    Was the patient seen the past year? No    Date of last office visit: 01/20/2023    Does the patient have an upcoming appointment?  No   If yes, When?     Provider to refill:SC    Does the patients insurance require a 100 day supply?  Yes

## 2024-01-31 NOTE — Clinical Note
IDT NOTE  Patient discussed today in interdisciplinary team meeting. Current plan meets patient needs.

## 2024-01-31 NOTE — TELEPHONE ENCOUNTER
90 day refill provided at this time and patient notified via [employer] to schedule appointment.

## 2024-02-01 ENCOUNTER — HOME CARE VISIT (OUTPATIENT)
Dept: HOME HEALTH SERVICES | Facility: HOME HEALTHCARE | Age: 84
End: 2024-02-01
Payer: MEDICARE

## 2024-02-01 VITALS
HEART RATE: 81 BPM | SYSTOLIC BLOOD PRESSURE: 120 MMHG | TEMPERATURE: 97.5 F | DIASTOLIC BLOOD PRESSURE: 68 MMHG | OXYGEN SATURATION: 95 % | RESPIRATION RATE: 17 BRPM

## 2024-02-01 PROCEDURE — G0299 HHS/HOSPICE OF RN EA 15 MIN: HCPCS

## 2024-02-01 PROCEDURE — 665998 HH PPS REVENUE CREDIT

## 2024-02-01 PROCEDURE — 665999 HH PPS REVENUE DEBIT

## 2024-02-01 ASSESSMENT — ENCOUNTER SYMPTOMS
FORGETFULNESS: 1
DENIES PAIN: 1
FATIGUES EASILY: 1
LAST BOWEL MOVEMENT: 66870
LIMITED RANGE OF MOTION: 1
BOWEL PATTERN NORMAL: 1
DRY SKIN: 1
PERSON REPORTING PAIN: PATIENT
STOOL FREQUENCY: LESS THAN DAILY

## 2024-02-02 PROCEDURE — 665998 HH PPS REVENUE CREDIT

## 2024-02-02 PROCEDURE — 665999 HH PPS REVENUE DEBIT

## 2024-02-03 PROCEDURE — 665998 HH PPS REVENUE CREDIT

## 2024-02-03 PROCEDURE — 665999 HH PPS REVENUE DEBIT

## 2024-02-04 PROCEDURE — 665999 HH PPS REVENUE DEBIT

## 2024-02-04 PROCEDURE — 665998 HH PPS REVENUE CREDIT

## 2024-02-05 ENCOUNTER — HOME CARE VISIT (OUTPATIENT)
Dept: HOME HEALTH SERVICES | Facility: HOME HEALTHCARE | Age: 84
End: 2024-02-05

## 2024-02-05 PROCEDURE — 665999 HH PPS REVENUE DEBIT

## 2024-02-05 PROCEDURE — 665998 HH PPS REVENUE CREDIT

## 2024-02-05 PROCEDURE — G0299 HHS/HOSPICE OF RN EA 15 MIN: HCPCS

## 2024-02-05 NOTE — Clinical Note
FYI  Pt reported that she felt like she was going to faint post breakfast on 2/5. VS WNL RN educated pt to not attempt tranfers or ambulation of pt feels the same way again and to notify HH. pt verbalized understanding

## 2024-02-06 VITALS
TEMPERATURE: 97.6 F | DIASTOLIC BLOOD PRESSURE: 72 MMHG | SYSTOLIC BLOOD PRESSURE: 110 MMHG | RESPIRATION RATE: 16 BRPM | HEART RATE: 87 BPM | OXYGEN SATURATION: 97 %

## 2024-02-06 PROCEDURE — 665999 HH PPS REVENUE DEBIT

## 2024-02-06 PROCEDURE — 665998 HH PPS REVENUE CREDIT

## 2024-02-06 ASSESSMENT — ENCOUNTER SYMPTOMS
NAUSEA: DENIES
LIMITED RANGE OF MOTION: 1
BOWEL PATTERN NORMAL: 1
STOOL FREQUENCY: DAILY
MUSCLE WEAKNESS: 1
VOMITING: DENIES
LAST BOWEL MOVEMENT: 66875
PERSON REPORTING PAIN: PATIENT
DENIES PAIN: 1

## 2024-02-06 ASSESSMENT — ACTIVITIES OF DAILY LIVING (ADL)
CURRENT_FUNCTION: STAND BY ASSIST
AMBULATION ASSISTANCE: STAND BY ASSIST

## 2024-02-07 ENCOUNTER — HOME CARE VISIT (OUTPATIENT)
Dept: HOME HEALTH SERVICES | Facility: HOME HEALTHCARE | Age: 84
End: 2024-02-07
Payer: MEDICARE

## 2024-02-07 PROCEDURE — 665998 HH PPS REVENUE CREDIT

## 2024-02-07 PROCEDURE — 665999 HH PPS REVENUE DEBIT

## 2024-02-07 NOTE — Clinical Note
IDT NOTE  Patient discussed today in interdisciplinary team meeting. Current plan meets patient needs. Wound improving

## 2024-02-08 ENCOUNTER — HOME CARE VISIT (OUTPATIENT)
Dept: HOME HEALTH SERVICES | Facility: HOME HEALTHCARE | Age: 84
End: 2024-02-08
Payer: MEDICARE

## 2024-02-08 PROCEDURE — 665998 HH PPS REVENUE CREDIT

## 2024-02-08 PROCEDURE — G0299 HHS/HOSPICE OF RN EA 15 MIN: HCPCS

## 2024-02-08 PROCEDURE — 665999 HH PPS REVENUE DEBIT

## 2024-02-09 PROCEDURE — 665998 HH PPS REVENUE CREDIT

## 2024-02-09 PROCEDURE — 665999 HH PPS REVENUE DEBIT

## 2024-02-10 PROCEDURE — 665998 HH PPS REVENUE CREDIT

## 2024-02-10 PROCEDURE — 665999 HH PPS REVENUE DEBIT

## 2024-02-11 VITALS
OXYGEN SATURATION: 97 % | DIASTOLIC BLOOD PRESSURE: 60 MMHG | HEART RATE: 82 BPM | TEMPERATURE: 97.4 F | RESPIRATION RATE: 16 BRPM | SYSTOLIC BLOOD PRESSURE: 116 MMHG

## 2024-02-11 PROCEDURE — 665998 HH PPS REVENUE CREDIT

## 2024-02-11 PROCEDURE — 665999 HH PPS REVENUE DEBIT

## 2024-02-11 ASSESSMENT — PAIN SCALES - PAIN ASSESSMENT IN ADVANCED DEMENTIA (PAINAD)
BODYLANGUAGE: 0 - RELAXED.
CONSOLABILITY: 0 - NO NEED TO CONSOLE.
TOTALSCORE: 0
FACIALEXPRESSION: 0 - SMILING OR INEXPRESSIVE.
NEGVOCALIZATION: 0 - NONE.

## 2024-02-11 ASSESSMENT — ENCOUNTER SYMPTOMS
MUSCLE WEAKNESS: 1
BOWEL PATTERN NORMAL: 1
PERSON REPORTING PAIN: PATIENT
LIMITED RANGE OF MOTION: 1
DRY SKIN: 1
DESCRIPTION OF MEMORY LOSS: SHORT TERM
LOWER EXTREMITY EDEMA: 1
STOOL FREQUENCY: LESS THAN DAILY
LAST BOWEL MOVEMENT: 66877
DENIES PAIN: 1

## 2024-02-12 ENCOUNTER — HOME CARE VISIT (OUTPATIENT)
Dept: HOME HEALTH SERVICES | Facility: HOME HEALTHCARE | Age: 84
End: 2024-02-12
Payer: MEDICARE

## 2024-02-12 PROCEDURE — 665999 HH PPS REVENUE DEBIT

## 2024-02-12 PROCEDURE — 665998 HH PPS REVENUE CREDIT

## 2024-02-12 RX ORDER — MAGNESIUM OXIDE 400 MG/1
400 TABLET ORAL
Qty: 90 TABLET | Refills: 0 | Status: SHIPPED | OUTPATIENT
Start: 2024-02-12

## 2024-02-12 NOTE — Clinical Note
I was unable to contact patient for nursing visit. LM for nursing visit 2/12 @ 1030 with no response. I drove by her home- no answer at door as well.

## 2024-02-12 NOTE — TELEPHONE ENCOUNTER
Received request via: Pharmacy    Was the patient seen in the last year in this department? Yes    Does the patient have an active prescription (recently filled or refills available) for medication(s) requested? No    [facility]    Does the patient have long term Plus and need 100 day supply (blood pressure, diabetes and cholesterol meds only)? Medication is not for cholesterol, blood pressure or diabetes

## 2024-02-13 ENCOUNTER — APPOINTMENT (OUTPATIENT)
Dept: MEDICAL GROUP | Facility: PHYSICIAN GROUP | Age: 84
End: 2024-02-13
Payer: MEDICARE

## 2024-02-13 PROCEDURE — 665999 HH PPS REVENUE DEBIT

## 2024-02-13 PROCEDURE — 665998 HH PPS REVENUE CREDIT

## 2024-02-14 PROCEDURE — 665998 HH PPS REVENUE CREDIT

## 2024-02-14 PROCEDURE — 665999 HH PPS REVENUE DEBIT

## 2024-02-15 ENCOUNTER — HOME CARE VISIT (OUTPATIENT)
Dept: HOME HEALTH SERVICES | Facility: HOME HEALTHCARE | Age: 84
End: 2024-02-15
Payer: MEDICARE

## 2024-02-15 VITALS
SYSTOLIC BLOOD PRESSURE: 124 MMHG | HEART RATE: 81 BPM | HEIGHT: 64 IN | DIASTOLIC BLOOD PRESSURE: 72 MMHG | RESPIRATION RATE: 18 BRPM | TEMPERATURE: 97.8 F | OXYGEN SATURATION: 93 % | BODY MASS INDEX: 31.24 KG/M2

## 2024-02-15 PROCEDURE — 665999 HH PPS REVENUE DEBIT

## 2024-02-15 PROCEDURE — A6212 FOAM DRG <=16 SQ IN W/BORDER: HCPCS

## 2024-02-15 PROCEDURE — 665998 HH PPS REVENUE CREDIT

## 2024-02-15 PROCEDURE — G0299 HHS/HOSPICE OF RN EA 15 MIN: HCPCS

## 2024-02-15 ASSESSMENT — ENCOUNTER SYMPTOMS
PAIN LOCATION - RELIEVING FACTORS: REST, TYLENOL
LOWER EXTREMITY EDEMA: 1
PAIN: 1
HIGHEST PAIN SEVERITY IN PAST 24 HOURS: 5/10
PERSON REPORTING PAIN: PATIENT
PAIN SEVERITY GOAL: 0/10
BOWEL PATTERN NORMAL: 1
VOMITING: PT DENIES
PAIN LOCATION: L HIP
PAIN LOCATION - PAIN QUALITY: ACHY
LOWEST PAIN SEVERITY IN PAST 24 HOURS: 1/10
MUSCLE WEAKNESS: 1
LAST BOWEL MOVEMENT: 66884
NAUSEA: PT DENIES
PAIN LOCATION - PAIN FREQUENCY: INFREQUENT
SKIN LESIONS: 1
SUBJECTIVE PAIN PROGRESSION: WAXING AND WANING
BLURRED VISION: 1
PAIN LOCATION - PAIN SEVERITY: 5/10

## 2024-02-16 PROCEDURE — 665998 HH PPS REVENUE CREDIT

## 2024-02-16 PROCEDURE — 665999 HH PPS REVENUE DEBIT

## 2024-02-17 PROCEDURE — 665998 HH PPS REVENUE CREDIT

## 2024-02-17 PROCEDURE — 665999 HH PPS REVENUE DEBIT

## 2024-02-18 PROCEDURE — 665998 HH PPS REVENUE CREDIT

## 2024-02-18 PROCEDURE — 665999 HH PPS REVENUE DEBIT

## 2024-02-19 ENCOUNTER — HOME CARE VISIT (OUTPATIENT)
Dept: HOME HEALTH SERVICES | Facility: HOME HEALTHCARE | Age: 84
End: 2024-02-19
Payer: MEDICARE

## 2024-02-19 PROCEDURE — 665999 HH PPS REVENUE DEBIT

## 2024-02-19 PROCEDURE — 665998 HH PPS REVENUE CREDIT

## 2024-02-19 NOTE — Clinical Note
Pt was not in her room ,had asked staff if pt went out and was told no. Found pt in the dining area eating and she said  she is doing fine to come back next time. Told her I can wait till she finished eating but she said she already took her medications and applied cream to her buttocks and said thank-you.

## 2024-02-20 ENCOUNTER — TELEPHONE (OUTPATIENT)
Dept: MEDICAL GROUP | Facility: PHYSICIAN GROUP | Age: 84
End: 2024-02-20
Payer: MEDICARE

## 2024-02-20 PROCEDURE — 665998 HH PPS REVENUE CREDIT

## 2024-02-20 PROCEDURE — 665999 HH PPS REVENUE DEBIT

## 2024-02-20 NOTE — LETTER
2/20/2024            Donte Magaña  [address] [place] [address]              Dear Donte,    Your care is very important to us, and we have noticed that on 02/15/2024, you missed your appointment with Rosi PASTRANA at [facility]       We’re committed to providing you with the best care possible. Your appointment time is reserved for you and your provider to discuss any current or new health concerns and, together, determine the best plan of care for you. Please call [phone] to reschedule at your earliest convenience.        In some cases, [facility] offers additional resources to make your healthcare more accessible, including transportation assistance, financial assistance and virtual visits. To learn more about these resources, please call [phone].       In order to keep you as informed as possible, below is a brief summary of our policy regarding missed appointments:        If a patient “No Shows”??three (3) or more appointments within a rolling 12-month           period, they may be dismissed from the practice for failure to follow clinician      recommendations.     If you have any concerns regarding the care you are receiving, please talk with your provider or call the office at [phone] and request to speak with the Practice . We’re committed to providing excellent care, and your feedback is invaluable.          Sincerely,     Rosi PASTRANA

## 2024-02-20 NOTE — CASE COMMUNICATION
noted  ----- Message -----  From: Sneha Medina R.N.  Sent: 2/7/2024  11:37 AM PST  To: Chela Mohamud R.N.; Jolie Escobar M.D.      IDT NOTE  Patient discussed today in interdisciplinary team meeting. Current plan meets patient needs. Wound improving

## 2024-02-20 NOTE — CASE COMMUNICATION
noted  ----- Message -----  From: Sneha Medina R.N.  Sent: 1/31/2024   1:24 PM PST  To: Chela Mohamud R.N.; Jolie Escobar M.D.      IDT NOTE  Patient discussed today in interdisciplinary team meeting. Current plan meets patient needs.

## 2024-02-20 NOTE — CASE COMMUNICATION
noted  ----- Message -----  From: Hellen Alas R.N.  Sent: 2/12/2024   3:51 PM PST  To: Chela Mohamud R.N.; Sneha Medina R.N.; *      I was unable to contact patient for nursing visit. LM for nursing visit 2/12 @ 1030 with no response. I drove by her home- no answer at door as well.

## 2024-02-20 NOTE — CASE COMMUNICATION
noted  ----- Message -----  From: Santino Yost R.N.  Sent: 2/6/2024   6:25 PM PST  To: Chela Mohamud R.N.; Kelly Caba R.N.      FYI  Pt reported that she felt like she was going to faint post breakfast on 2/5. VS WNL RN educated pt to not attempt tranfers or ambulation of pt feels the same way again and to notify HH. pt verbalized understanding

## 2024-02-21 PROCEDURE — 665999 HH PPS REVENUE DEBIT

## 2024-02-21 PROCEDURE — 665998 HH PPS REVENUE CREDIT

## 2024-02-21 NOTE — CASE COMMUNICATION
noted  ----- Message -----  From: Kelly Caba R.N.  Sent: 2/20/2024   9:30 AM PST  To: Chela Mohamud R.N.; Sneha Medina R.N.; *      Pt was not in her room ,had asked staff if pt went out and was told no. Found pt in the dining area eating and she said  she is doing fine to come back next time. Told her I can wait till she finished eating but she said she already took her medications and applied cream to her buttocks and said th ank-you.

## 2024-02-22 ENCOUNTER — HOME CARE VISIT (OUTPATIENT)
Dept: HOME HEALTH SERVICES | Facility: HOME HEALTHCARE | Age: 84
End: 2024-02-22
Payer: MEDICARE

## 2024-02-22 VITALS
HEART RATE: 82 BPM | SYSTOLIC BLOOD PRESSURE: 126 MMHG | DIASTOLIC BLOOD PRESSURE: 58 MMHG | OXYGEN SATURATION: 98 % | TEMPERATURE: 97.4 F | RESPIRATION RATE: 16 BRPM

## 2024-02-22 PROCEDURE — 665001 SOC-HOME HEALTH

## 2024-02-22 PROCEDURE — 665998 HH PPS REVENUE CREDIT

## 2024-02-22 PROCEDURE — G0299 HHS/HOSPICE OF RN EA 15 MIN: HCPCS

## 2024-02-22 PROCEDURE — 665999 HH PPS REVENUE DEBIT

## 2024-02-22 ASSESSMENT — ENCOUNTER SYMPTOMS
PERSON REPORTING PAIN: PATIENT
LIMITED RANGE OF MOTION: 1
DENIES PAIN: 1
FORGETFULNESS: 1

## 2024-02-23 PROCEDURE — 665998 HH PPS REVENUE CREDIT

## 2024-02-23 PROCEDURE — 665999 HH PPS REVENUE DEBIT

## 2024-02-24 PROCEDURE — 665998 HH PPS REVENUE CREDIT

## 2024-02-24 PROCEDURE — 665999 HH PPS REVENUE DEBIT

## 2024-02-25 PROCEDURE — 665998 HH PPS REVENUE CREDIT

## 2024-02-25 PROCEDURE — 665999 HH PPS REVENUE DEBIT

## 2024-02-26 ENCOUNTER — HOME CARE VISIT (OUTPATIENT)
Dept: HOME HEALTH SERVICES | Facility: HOME HEALTHCARE | Age: 84
End: 2024-02-26
Payer: MEDICARE

## 2024-02-26 VITALS
RESPIRATION RATE: 16 BRPM | OXYGEN SATURATION: 95 % | HEART RATE: 87 BPM | SYSTOLIC BLOOD PRESSURE: 140 MMHG | TEMPERATURE: 97.4 F | DIASTOLIC BLOOD PRESSURE: 66 MMHG

## 2024-02-26 PROCEDURE — 665999 HH PPS REVENUE DEBIT

## 2024-02-26 PROCEDURE — G0299 HHS/HOSPICE OF RN EA 15 MIN: HCPCS

## 2024-02-26 PROCEDURE — 665998 HH PPS REVENUE CREDIT

## 2024-02-26 ASSESSMENT — ENCOUNTER SYMPTOMS
LAST BOWEL MOVEMENT: 66896
VOMITING: DENIES
DENIES PAIN: 1
PERSON REPORTING PAIN: PATIENT
NAUSEA: DENIES
BOWEL PATTERN NORMAL: 1

## 2024-02-26 ASSESSMENT — ACTIVITIES OF DAILY LIVING (ADL)
AMBULATION ASSISTANCE: STAND BY ASSIST
CURRENT_FUNCTION: STAND BY ASSIST

## 2024-02-27 PROCEDURE — 665998 HH PPS REVENUE CREDIT

## 2024-02-27 PROCEDURE — 665999 HH PPS REVENUE DEBIT

## 2024-02-28 PROCEDURE — 665998 HH PPS REVENUE CREDIT

## 2024-02-28 PROCEDURE — 665999 HH PPS REVENUE DEBIT

## 2024-02-29 ENCOUNTER — HOME CARE VISIT (OUTPATIENT)
Dept: HOME HEALTH SERVICES | Facility: HOME HEALTHCARE | Age: 84
End: 2024-02-29
Payer: MEDICARE

## 2024-02-29 VITALS
TEMPERATURE: 97.5 F | OXYGEN SATURATION: 95 % | RESPIRATION RATE: 18 BRPM | HEART RATE: 85 BPM | DIASTOLIC BLOOD PRESSURE: 56 MMHG | SYSTOLIC BLOOD PRESSURE: 112 MMHG

## 2024-02-29 PROCEDURE — 665999 HH PPS REVENUE DEBIT

## 2024-02-29 PROCEDURE — 665998 HH PPS REVENUE CREDIT

## 2024-02-29 PROCEDURE — G0299 HHS/HOSPICE OF RN EA 15 MIN: HCPCS

## 2024-02-29 ASSESSMENT — ENCOUNTER SYMPTOMS
MUSCLE WEAKNESS: 1
POOR JUDGMENT: 1
STOOL FREQUENCY: LESS THAN DAILY
DIFFICULTY THINKING: 1
BOWEL PATTERN NORMAL: 1
DENIES PAIN: 1
LAST BOWEL MOVEMENT: 66898
DESCRIPTION OF MEMORY LOSS: SHORT TERM
FORGETFULNESS: 1

## 2024-02-29 NOTE — CASE COMMUNICATION
FYI  Wounds reopened & larger since last time this nurse saw patient. Was not using waffle cushion for pressure relief in recliner. Wound care changed & instructed to keep using waffle cushion. Verbalzied understanding but poor memory & anticipate poor compliance

## 2024-03-01 PROCEDURE — 665998 HH PPS REVENUE CREDIT

## 2024-03-01 PROCEDURE — 665999 HH PPS REVENUE DEBIT

## 2024-03-02 PROCEDURE — 665998 HH PPS REVENUE CREDIT

## 2024-03-02 PROCEDURE — 665999 HH PPS REVENUE DEBIT

## 2024-03-03 PROCEDURE — 665999 HH PPS REVENUE DEBIT

## 2024-03-03 PROCEDURE — 665998 HH PPS REVENUE CREDIT

## 2024-03-04 ENCOUNTER — HOME CARE VISIT (OUTPATIENT)
Dept: HOME HEALTH SERVICES | Facility: HOME HEALTHCARE | Age: 84
End: 2024-03-04
Payer: MEDICARE

## 2024-03-04 PROCEDURE — 665999 HH PPS REVENUE DEBIT

## 2024-03-04 PROCEDURE — G0299 HHS/HOSPICE OF RN EA 15 MIN: HCPCS

## 2024-03-04 PROCEDURE — 665998 HH PPS REVENUE CREDIT

## 2024-03-05 PROCEDURE — 665999 HH PPS REVENUE DEBIT

## 2024-03-05 PROCEDURE — 665998 HH PPS REVENUE CREDIT

## 2024-03-06 PROCEDURE — 665998 HH PPS REVENUE CREDIT

## 2024-03-06 PROCEDURE — 665999 HH PPS REVENUE DEBIT

## 2024-03-07 ENCOUNTER — HOME CARE VISIT (OUTPATIENT)
Dept: HOME HEALTH SERVICES | Facility: HOME HEALTHCARE | Age: 84
End: 2024-03-07
Payer: MEDICARE

## 2024-03-07 VITALS
DIASTOLIC BLOOD PRESSURE: 50 MMHG | SYSTOLIC BLOOD PRESSURE: 110 MMHG | RESPIRATION RATE: 18 BRPM | OXYGEN SATURATION: 96 % | HEART RATE: 76 BPM | TEMPERATURE: 97.5 F

## 2024-03-07 PROCEDURE — 665999 HH PPS REVENUE DEBIT

## 2024-03-07 PROCEDURE — G0299 HHS/HOSPICE OF RN EA 15 MIN: HCPCS

## 2024-03-07 PROCEDURE — 665998 HH PPS REVENUE CREDIT

## 2024-03-07 ASSESSMENT — ENCOUNTER SYMPTOMS
PAIN LOCATION - PAIN DURATION: YEARS
PERSON REPORTING PAIN: PATIENT
PAIN LOCATION: BACK/HIPS
DESCRIPTION OF MEMORY LOSS: LONG TERM
POOR JUDGMENT: 1
LAST BOWEL MOVEMENT: 66903
PAIN LOCATION - PAIN QUALITY: ACHE
ARTHRALGIAS: 1
HIGHEST PAIN SEVERITY IN PAST 24 HOURS: 4/10
DESCRIPTION OF MEMORY LOSS: SHORT TERM
STOOL FREQUENCY: DAILY
PAIN LOCATION - PAIN FREQUENCY: INTERMITTENT
BOWEL PATTERN NORMAL: 1
MUSCLE WEAKNESS: 1
PAIN SEVERITY GOAL: 0/10
PAIN: 1
FORGETFULNESS: 1
SUBJECTIVE PAIN PROGRESSION: UNCHANGED
PAIN LOCATION - PAIN SEVERITY: 0/10
LOWEST PAIN SEVERITY IN PAST 24 HOURS: 0/10

## 2024-03-08 VITALS
HEART RATE: 86 BPM | TEMPERATURE: 97.5 F | DIASTOLIC BLOOD PRESSURE: 50 MMHG | OXYGEN SATURATION: 97 % | SYSTOLIC BLOOD PRESSURE: 126 MMHG | RESPIRATION RATE: 18 BRPM

## 2024-03-08 PROCEDURE — 665999 HH PPS REVENUE DEBIT

## 2024-03-08 PROCEDURE — 665998 HH PPS REVENUE CREDIT

## 2024-03-08 ASSESSMENT — ENCOUNTER SYMPTOMS
BOWEL PATTERN NORMAL: 1
MUSCLE WEAKNESS: 1
POOR JUDGMENT: 1
LAST BOWEL MOVEMENT: 66906
DESCRIPTION OF MEMORY LOSS: SHORT TERM
DENIES PAIN: 1
STOOL FREQUENCY: DAILY

## 2024-03-09 PROCEDURE — 665999 HH PPS REVENUE DEBIT

## 2024-03-09 PROCEDURE — 665998 HH PPS REVENUE CREDIT

## 2024-03-10 PROCEDURE — 665999 HH PPS REVENUE DEBIT

## 2024-03-10 PROCEDURE — 665998 HH PPS REVENUE CREDIT

## 2024-03-11 ENCOUNTER — HOME CARE VISIT (OUTPATIENT)
Dept: HOME HEALTH SERVICES | Facility: HOME HEALTHCARE | Age: 84
End: 2024-03-11
Payer: MEDICARE

## 2024-03-11 VITALS
RESPIRATION RATE: 18 BRPM | OXYGEN SATURATION: 93 % | TEMPERATURE: 97 F | HEART RATE: 97 BPM | SYSTOLIC BLOOD PRESSURE: 124 MMHG | DIASTOLIC BLOOD PRESSURE: 60 MMHG

## 2024-03-11 PROCEDURE — 665998 HH PPS REVENUE CREDIT

## 2024-03-11 PROCEDURE — 665999 HH PPS REVENUE DEBIT

## 2024-03-11 PROCEDURE — G0299 HHS/HOSPICE OF RN EA 15 MIN: HCPCS

## 2024-03-11 ASSESSMENT — ENCOUNTER SYMPTOMS
DESCRIPTION OF MEMORY LOSS: SHORT TERM
PERSON REPORTING PAIN: PATIENT
PAIN LOCATION: LT HIP
PAIN LOCATION - RELIEVING FACTORS: MEDS
ARTHRALGIAS: 1
LOWEST PAIN SEVERITY IN PAST 24 HOURS: 0/10
PAIN: 1
HIGHEST PAIN SEVERITY IN PAST 24 HOURS: 5/10
MUSCLE WEAKNESS: 1
MENTAL STATUS CHANGE: 0
PAIN SEVERITY GOAL: 0/10
PAIN LOCATION - PAIN QUALITY: ACHE
POOR JUDGMENT: 1
LAST BOWEL MOVEMENT: 66909
BOWEL PATTERN NORMAL: 1
PAIN LOCATION - PAIN FREQUENCY: FREQUENT
FORGETFULNESS: 1
PAIN LOCATION - PAIN SEVERITY: 5/10
SUBJECTIVE PAIN PROGRESSION: UNCHANGED
STOOL FREQUENCY: DAILY
PAIN LOCATION - PAIN DURATION: ONGOING

## 2024-03-12 PROCEDURE — 665999 HH PPS REVENUE DEBIT

## 2024-03-12 PROCEDURE — 665998 HH PPS REVENUE CREDIT

## 2024-03-13 PROCEDURE — 665999 HH PPS REVENUE DEBIT

## 2024-03-13 PROCEDURE — 665998 HH PPS REVENUE CREDIT

## 2024-03-14 ENCOUNTER — HOME CARE VISIT (OUTPATIENT)
Dept: HOME HEALTH SERVICES | Facility: HOME HEALTHCARE | Age: 84
End: 2024-03-14
Payer: MEDICARE

## 2024-03-14 VITALS
HEART RATE: 75 BPM | DIASTOLIC BLOOD PRESSURE: 60 MMHG | TEMPERATURE: 97 F | OXYGEN SATURATION: 98 % | RESPIRATION RATE: 18 BRPM | SYSTOLIC BLOOD PRESSURE: 130 MMHG

## 2024-03-14 PROCEDURE — 665999 HH PPS REVENUE DEBIT

## 2024-03-14 PROCEDURE — G0299 HHS/HOSPICE OF RN EA 15 MIN: HCPCS

## 2024-03-14 PROCEDURE — 665998 HH PPS REVENUE CREDIT

## 2024-03-14 ASSESSMENT — ENCOUNTER SYMPTOMS
DESCRIPTION OF MEMORY LOSS: SHORT TERM
DENIES PAIN: 1
POOR JUDGMENT: 1
LOWER EXTREMITY EDEMA: 1
FORGETFULNESS: 1

## 2024-03-14 ASSESSMENT — PATIENT HEALTH QUESTIONNAIRE - PHQ9
SUM OF ALL RESPONSES TO PHQ QUESTIONS 1-9: 3
CLINICAL INTERPRETATION OF PHQ2 SCORE: 1
5. POOR APPETITE OR OVEREATING: 0 - NOT AT ALL

## 2024-03-14 ASSESSMENT — ACTIVITIES OF DAILY LIVING (ADL): OASIS_M1830: 03

## 2024-03-15 ENCOUNTER — HOME CARE VISIT (OUTPATIENT)
Dept: HOME HEALTH SERVICES | Facility: HOME HEALTHCARE | Age: 84
End: 2024-03-15
Payer: MEDICARE

## 2024-03-15 PROCEDURE — 665999 HH PPS REVENUE DEBIT

## 2024-03-15 PROCEDURE — 665998 HH PPS REVENUE CREDIT

## 2024-03-15 NOTE — CASE COMMUNICATION
Primary dx/Skilled need:  SN frequency: EXAMPLE: 1-2w9, if pt has lab draw, dialysis, wound care, etc. on specific days please indicate under this area.  Disciplines ordered:   Insurance & authorization:   Certification period:  Special considerations: EXAMPLE: specific directions to pt home etc.     Primary dx/Skilled need:  SN frequency: EXAMPLE: 1-2w9, if pt has lab draw, dialysis, wound care, etc. on specific days please indicate un tao this area.  Disciplines ordered:   Insurance & authorization:   Certification period:  Special considerations: EXAMPLE: specific directions to pt home etc.     Primary dx/Skilled need:wound care  SN frequency:2w4  Disciplines ordered:   Insurance & authorization: [facility]  Certification period:3/18-5/16  Special considerations: EXAMPLE: specific directions to pt home etc.

## 2024-03-16 PROCEDURE — 665998 HH PPS REVENUE CREDIT

## 2024-03-16 PROCEDURE — 665999 HH PPS REVENUE DEBIT

## 2024-03-17 PROCEDURE — 665998 HH PPS REVENUE CREDIT

## 2024-03-17 PROCEDURE — G0179 MD RECERTIFICATION HHA PT: HCPCS | Performed by: STUDENT IN AN ORGANIZED HEALTH CARE EDUCATION/TRAINING PROGRAM

## 2024-03-17 PROCEDURE — 665999 HH PPS REVENUE DEBIT

## 2024-03-18 ENCOUNTER — HOME CARE VISIT (OUTPATIENT)
Dept: HOME HEALTH SERVICES | Facility: HOME HEALTHCARE | Age: 84
End: 2024-03-18
Payer: MEDICARE

## 2024-03-18 VITALS
DIASTOLIC BLOOD PRESSURE: 60 MMHG | HEART RATE: 82 BPM | SYSTOLIC BLOOD PRESSURE: 118 MMHG | TEMPERATURE: 97.7 F | RESPIRATION RATE: 17 BRPM | OXYGEN SATURATION: 95 %

## 2024-03-18 PROCEDURE — 665003 FOLLOW UP-HOME HEALTH

## 2024-03-18 PROCEDURE — 665998 HH PPS REVENUE CREDIT

## 2024-03-18 PROCEDURE — 665999 HH PPS REVENUE DEBIT

## 2024-03-18 PROCEDURE — G0299 HHS/HOSPICE OF RN EA 15 MIN: HCPCS

## 2024-03-18 ASSESSMENT — ENCOUNTER SYMPTOMS
BOWEL PATTERN NORMAL: 1
PERSON REPORTING PAIN: PATIENT
LAST BOWEL MOVEMENT: 66916
LIMITED RANGE OF MOTION: 1
DENIES PAIN: 1
STOOL FREQUENCY: DAILY
MUSCLE WEAKNESS: 1
DRY SKIN: 1
FATIGUES EASILY: 1
LOWER EXTREMITY EDEMA: 1
FATIGUE: 1
FORGETFULNESS: 1

## 2024-03-18 NOTE — Clinical Note
No answer after several knocks , ringing doorbell. Had to look for pt in the dining area,not there . Had to go back in pt's room and asked one of the staff to open the door. Found pt sitting in the room asked why she didnt open the door and she said her daughter told her she will do dressing change. Informed pt that I spoke with daughter yesterday afternoon and she will be comig today to talk to staff re management of medications. No dressings noted to sacral area. Wound care done. areas, flaky,pinkish, small scab to right sacral area. Reenfroced to keep dressing intact , pressure ulcer prevention and offloading. Pt said she is gettig ready to eat lunch .Denies chest pains,breathing problems ,bleeding or falls .

## 2024-03-19 PROCEDURE — 665999 HH PPS REVENUE DEBIT

## 2024-03-19 PROCEDURE — 665998 HH PPS REVENUE CREDIT

## 2024-03-19 NOTE — CASE COMMUNICATION
noted  ----- Message -----  From: Kelly Caba R.N.  Sent: 3/18/2024   9:30 PM PDT  To: Chela Mohamud R.N.; Sneha Medina R.N.      No answer after several knocks , ringing doorbell. Had to look for pt in the dining area,not there . Had to go back in pt's room and asked one of the staff to open the door. Found pt sitting in the room asked why she didnt open the door and she said her daughter told her she will do dressing change. I nformed pt that I spoke with daughter yesterday afternoon and she will be comig today to talk to staff re management of medications. No dressings noted to sacral area. Wound care done. areas, flaky,pinkish, small scab to right sacral area. Reenfroced to keep dressing intact , pressure ulcer prevention and offloading. Pt said she is gettig ready to eat lunch .Denies chest pains,breathing problems ,bleeding or falls .

## 2024-03-20 ENCOUNTER — HOME CARE VISIT (OUTPATIENT)
Dept: HOME HEALTH SERVICES | Facility: HOME HEALTHCARE | Age: 84
End: 2024-03-20
Payer: MEDICARE

## 2024-03-20 PROCEDURE — 665999 HH PPS REVENUE DEBIT

## 2024-03-20 PROCEDURE — 665998 HH PPS REVENUE CREDIT

## 2024-03-20 NOTE — CASE COMMUNICATION
Quality Review Completed for 3/14 Recert OASIS by LILLIE Stockton RN on 3/20/2024:     Edits completed by LILLIE Stockton RN:     1.  and  diagnosis coding updated per chart review  2.  changed to #1 for the pressure injury  3. Checked ambulate only w/assist to 485 Safety Measures.   4. Resolved MET care plan interventions

## 2024-03-21 ENCOUNTER — HOME CARE VISIT (OUTPATIENT)
Dept: HOME HEALTH SERVICES | Facility: HOME HEALTHCARE | Age: 84
End: 2024-03-21
Payer: MEDICARE

## 2024-03-21 VITALS
SYSTOLIC BLOOD PRESSURE: 120 MMHG | RESPIRATION RATE: 17 BRPM | TEMPERATURE: 97.8 F | OXYGEN SATURATION: 96 % | DIASTOLIC BLOOD PRESSURE: 64 MMHG | HEART RATE: 90 BPM

## 2024-03-21 PROCEDURE — G0299 HHS/HOSPICE OF RN EA 15 MIN: HCPCS

## 2024-03-21 PROCEDURE — 665998 HH PPS REVENUE CREDIT

## 2024-03-21 PROCEDURE — 665999 HH PPS REVENUE DEBIT

## 2024-03-21 ASSESSMENT — ENCOUNTER SYMPTOMS
FORGETFULNESS: 1
DENIES PAIN: 1
LOWER EXTREMITY EDEMA: 1
LAST BOWEL MOVEMENT: 66920
LIMITED RANGE OF MOTION: 1
BOWEL PATTERN NORMAL: 1
FATIGUE: 1
MUSCLE WEAKNESS: 1
STOOL FREQUENCY: DAILY
FATIGUES EASILY: 1
DRY SKIN: 1
PERSON REPORTING PAIN: PATIENT

## 2024-03-21 ASSESSMENT — PATIENT HEALTH QUESTIONNAIRE - PHQ9: CLINICAL INTERPRETATION OF PHQ2 SCORE: 0

## 2024-03-21 NOTE — Clinical Note
Met pt in the hallway sitted in motorized chair, and said she went down to have breakfast. Spoke with [employer] Nereida and said family managed medications. Pt also reported that daughter came and filled medbox. No dressings noted to buttocks . Wound care done, didn't want hypafix tape..Had taken morning meds as reported.  Denies pain chest pains,breathing problems,bleeding or falls .

## 2024-03-22 PROCEDURE — 665999 HH PPS REVENUE DEBIT

## 2024-03-22 PROCEDURE — 665998 HH PPS REVENUE CREDIT

## 2024-03-22 NOTE — CASE COMMUNICATION
I agreed to change  ----- Message -----  From: Angelica Stockton R.N.  Sent: 3/20/2024   9:34 AM PDT  To: Chela Mohamud R.N.      Quality Review Completed for 3/14 Recert OASIS by LILLIE Stockton, RN on 3/20/2024:     Edits completed by LILLIE Stockton RN:     1.  and  diagnosis coding updated per chart review  2.  changed to #1 for the pressure injury  3. Checked ambulate only w/assist to 485 Safety Measures.   4. Resolved MET care pl an interventions

## 2024-03-22 NOTE — CASE COMMUNICATION
noted  ----- Message -----  From: Kelly Caba R.N.  Sent: 3/21/2024   6:40 PM PDT  To: Chela Mohamud R.N.; Sneha Medina R.N.      Met pt in the hallway sitted in motorized chair, and said she went down to have breakfast. Spoke with USERE[employer]Y and said family managed medications. Pt also reported that daughter came and filled medbox. No dressings noted to buttocks . Wound care done, didn't want hypafix tape..Had taken morning me ds as reported.  Denies pain chest pains,breathing problems,bleeding or falls .

## 2024-03-23 PROCEDURE — 665998 HH PPS REVENUE CREDIT

## 2024-03-23 PROCEDURE — 665999 HH PPS REVENUE DEBIT

## 2024-03-24 PROCEDURE — 665999 HH PPS REVENUE DEBIT

## 2024-03-24 PROCEDURE — 665998 HH PPS REVENUE CREDIT

## 2024-03-25 ENCOUNTER — HOME CARE VISIT (OUTPATIENT)
Dept: HOME HEALTH SERVICES | Facility: HOME HEALTHCARE | Age: 84
End: 2024-03-25
Payer: MEDICARE

## 2024-03-25 VITALS
SYSTOLIC BLOOD PRESSURE: 124 MMHG | TEMPERATURE: 97.6 F | DIASTOLIC BLOOD PRESSURE: 70 MMHG | OXYGEN SATURATION: 94 % | HEART RATE: 82 BPM | RESPIRATION RATE: 17 BRPM

## 2024-03-25 PROCEDURE — 665999 HH PPS REVENUE DEBIT

## 2024-03-25 PROCEDURE — G0299 HHS/HOSPICE OF RN EA 15 MIN: HCPCS

## 2024-03-25 PROCEDURE — 665998 HH PPS REVENUE CREDIT

## 2024-03-25 ASSESSMENT — ENCOUNTER SYMPTOMS
MUSCLE WEAKNESS: 1
FATIGUE: 1
DENIES PAIN: 1
FORGETFULNESS: 1
ARTHRALGIAS: 1
FATIGUES EASILY: 1
BOWEL PATTERN NORMAL: 1
LAST BOWEL MOVEMENT: 66923
LIMITED RANGE OF MOTION: 1
LOWER EXTREMITY EDEMA: 1
DRY SKIN: 1
PERSON REPORTING PAIN: PATIENT

## 2024-03-25 ASSESSMENT — PAIN SCALES - PAIN ASSESSMENT IN ADVANCED DEMENTIA (PAINAD)
NEGVOCALIZATION: 0 - NONE.
TOTALSCORE: 1
BODYLANGUAGE: 0 - RELAXED.
FACIALEXPRESSION: 0 - SMILING OR INEXPRESSIVE.
CONSOLABILITY: 0 - NO NEED TO CONSOLE.

## 2024-03-25 ASSESSMENT — PATIENT HEALTH QUESTIONNAIRE - PHQ9: CLINICAL INTERPRETATION OF PHQ2 SCORE: 0

## 2024-03-26 PROCEDURE — 665999 HH PPS REVENUE DEBIT

## 2024-03-26 PROCEDURE — 665998 HH PPS REVENUE CREDIT

## 2024-03-27 ENCOUNTER — HOME CARE VISIT (OUTPATIENT)
Dept: HOME HEALTH SERVICES | Facility: HOME HEALTHCARE | Age: 84
End: 2024-03-27
Payer: MEDICARE

## 2024-03-27 PROCEDURE — 665998 HH PPS REVENUE CREDIT

## 2024-03-27 PROCEDURE — 665999 HH PPS REVENUE DEBIT

## 2024-03-28 ENCOUNTER — HOME CARE VISIT (OUTPATIENT)
Dept: HOME HEALTH SERVICES | Facility: HOME HEALTHCARE | Age: 84
End: 2024-03-28
Payer: MEDICARE

## 2024-03-28 PROCEDURE — 665999 HH PPS REVENUE DEBIT

## 2024-03-28 PROCEDURE — G0299 HHS/HOSPICE OF RN EA 15 MIN: HCPCS

## 2024-03-28 PROCEDURE — 665998 HH PPS REVENUE CREDIT

## 2024-03-28 NOTE — CASE COMMUNICATION
Patient discussed today in interdisciplinary team meeting. Current plan meets patient needs. Not keeping dressing on but no open areas. Anticipate discharge soon

## 2024-03-29 PROCEDURE — 665999 HH PPS REVENUE DEBIT

## 2024-03-29 PROCEDURE — 665998 HH PPS REVENUE CREDIT

## 2024-03-30 PROCEDURE — 665999 HH PPS REVENUE DEBIT

## 2024-03-30 PROCEDURE — 665998 HH PPS REVENUE CREDIT

## 2024-03-31 VITALS
TEMPERATURE: 98 F | HEART RATE: 78 BPM | RESPIRATION RATE: 16 BRPM | OXYGEN SATURATION: 95 % | SYSTOLIC BLOOD PRESSURE: 110 MMHG | DIASTOLIC BLOOD PRESSURE: 64 MMHG

## 2024-03-31 DIAGNOSIS — R32 URINARY INCONTINENCE, UNSPECIFIED TYPE: ICD-10-CM

## 2024-03-31 ASSESSMENT — ENCOUNTER SYMPTOMS
DESCRIPTION OF MEMORY LOSS: LONG TERM
FORGETFULNESS: 1
LOWER EXTREMITY EDEMA: 1
DENIES PAIN: 1
BOWEL PATTERN NORMAL: 1
DRY SKIN: 1
MUSCLE WEAKNESS: 1
ARTHRALGIAS: 1
LIMITED RANGE OF MOTION: 1
DESCRIPTION OF MEMORY LOSS: SHORT TERM
FATIGUES EASILY: 1
LAST BOWEL MOVEMENT: 66927
FATIGUE: 1
PERSON REPORTING PAIN: PATIENT

## 2024-03-31 ASSESSMENT — PAIN SCALES - PAIN ASSESSMENT IN ADVANCED DEMENTIA (PAINAD)
FACIALEXPRESSION: 0 - SMILING OR INEXPRESSIVE.
NEGVOCALIZATION: 0 - NONE.
TOTALSCORE: 0
BODYLANGUAGE: 0 - RELAXED.
CONSOLABILITY: 0 - NO NEED TO CONSOLE.

## 2024-03-31 ASSESSMENT — PATIENT HEALTH QUESTIONNAIRE - PHQ9: CLINICAL INTERPRETATION OF PHQ2 SCORE: 0

## 2024-04-01 ENCOUNTER — HOME CARE VISIT (OUTPATIENT)
Dept: HOME HEALTH SERVICES | Facility: HOME HEALTHCARE | Age: 84
End: 2024-04-01
Payer: MEDICARE

## 2024-04-01 VITALS
TEMPERATURE: 97.4 F | OXYGEN SATURATION: 97 % | SYSTOLIC BLOOD PRESSURE: 118 MMHG | HEART RATE: 74 BPM | DIASTOLIC BLOOD PRESSURE: 64 MMHG | RESPIRATION RATE: 16 BRPM

## 2024-04-01 PROCEDURE — G0299 HHS/HOSPICE OF RN EA 15 MIN: HCPCS

## 2024-04-01 RX ORDER — MIRABEGRON 50 MG/1
50 TABLET, FILM COATED, EXTENDED RELEASE ORAL DAILY
Qty: 90 TABLET | Refills: 0 | Status: ON HOLD | OUTPATIENT
Start: 2024-04-01 | End: 2024-04-12

## 2024-04-01 ASSESSMENT — ENCOUNTER SYMPTOMS
LOWER EXTREMITY EDEMA: 1
LAST BOWEL MOVEMENT: 66930
BOWEL PATTERN NORMAL: 1
PERSON REPORTING PAIN: PATIENT
STOOL FREQUENCY: LESS THAN DAILY
DRY SKIN: 1
DENIES PAIN: 1
FORGETFULNESS: 1
FATIGUE: 1
LIMITED RANGE OF MOTION: 1
MUSCLE WEAKNESS: 1
FATIGUES EASILY: 1

## 2024-04-01 ASSESSMENT — PAIN SCALES - PAIN ASSESSMENT IN ADVANCED DEMENTIA (PAINAD)
TOTALSCORE: 0
BODYLANGUAGE: 0 - RELAXED.
NEGVOCALIZATION: 0 - NONE.
FACIALEXPRESSION: 0 - SMILING OR INEXPRESSIVE.
CONSOLABILITY: 0 - NO NEED TO CONSOLE.

## 2024-04-01 ASSESSMENT — PATIENT HEALTH QUESTIONNAIRE - PHQ9: CLINICAL INTERPRETATION OF PHQ2 SCORE: 0

## 2024-04-04 ENCOUNTER — HOSPITAL ENCOUNTER (EMERGENCY)
Facility: MEDICAL CENTER | Age: 84
DRG: 189 | End: 2024-04-05
Attending: EMERGENCY MEDICINE
Payer: MEDICARE

## 2024-04-04 ENCOUNTER — APPOINTMENT (OUTPATIENT)
Dept: RADIOLOGY | Facility: MEDICAL CENTER | Age: 84
DRG: 189 | End: 2024-04-04
Attending: EMERGENCY MEDICINE
Payer: MEDICARE

## 2024-04-04 ENCOUNTER — HOME CARE VISIT (OUTPATIENT)
Dept: HOME HEALTH SERVICES | Facility: HOME HEALTHCARE | Age: 84
End: 2024-04-04
Payer: MEDICARE

## 2024-04-04 DIAGNOSIS — R06.02 SHORTNESS OF BREATH: ICD-10-CM

## 2024-04-04 DIAGNOSIS — Z86.79 HISTORY OF CHF (CONGESTIVE HEART FAILURE): ICD-10-CM

## 2024-04-04 DIAGNOSIS — R79.89 ELEVATED BRAIN NATRIURETIC PEPTIDE (BNP) LEVEL: ICD-10-CM

## 2024-04-04 LAB
BASOPHILS # BLD AUTO: 0.4 % (ref 0–1.8)
BASOPHILS # BLD: 0.04 K/UL (ref 0–0.12)
EKG IMPRESSION: NORMAL
EOSINOPHIL # BLD AUTO: 0.11 K/UL (ref 0–0.51)
EOSINOPHIL NFR BLD: 1.1 % (ref 0–6.9)
ERYTHROCYTE [DISTWIDTH] IN BLOOD BY AUTOMATED COUNT: 50.9 FL (ref 35.9–50)
GFR SERPLBLD CREATININE-BSD FMLA CKD-EPI: 60 ML/MIN/1.73 M 2
HCT VFR BLD AUTO: 42.9 % (ref 37–47)
HGB BLD-MCNC: 14.2 G/DL (ref 12–16)
IMM GRANULOCYTES # BLD AUTO: 0.03 K/UL (ref 0–0.11)
IMM GRANULOCYTES NFR BLD AUTO: 0.3 % (ref 0–0.9)
LYMPHOCYTES # BLD AUTO: 2.23 K/UL (ref 1–4.8)
LYMPHOCYTES NFR BLD: 21.4 % (ref 22–41)
MCH RBC QN AUTO: 33.1 PG (ref 27–33)
MCHC RBC AUTO-ENTMCNC: 33.1 G/DL (ref 32.2–35.5)
MCV RBC AUTO: 100 FL (ref 81.4–97.8)
MONOCYTES # BLD AUTO: 0.91 K/UL (ref 0–0.85)
MONOCYTES NFR BLD AUTO: 8.7 % (ref 0–13.4)
NEUTROPHILS # BLD AUTO: 7.12 K/UL (ref 1.82–7.42)
NEUTROPHILS NFR BLD: 68.1 % (ref 44–72)
NRBC # BLD AUTO: 0 K/UL
NRBC BLD-RTO: 0 /100 WBC (ref 0–0.2)
NT-PROBNP SERPL IA-MCNC: 1079 PG/ML (ref 0–125)
PLATELET # BLD AUTO: 246 K/UL (ref 164–446)
PMV BLD AUTO: 9.9 FL (ref 9–12.9)
RBC # BLD AUTO: 4.29 M/UL (ref 4.2–5.4)
WBC # BLD AUTO: 10.4 K/UL (ref 4.8–10.8)

## 2024-04-04 PROCEDURE — 36415 COLL VENOUS BLD VENIPUNCTURE: CPT

## 2024-04-04 PROCEDURE — 93005 ELECTROCARDIOGRAM TRACING: CPT | Performed by: EMERGENCY MEDICINE

## 2024-04-04 PROCEDURE — 80053 COMPREHEN METABOLIC PANEL: CPT

## 2024-04-04 PROCEDURE — 71045 X-RAY EXAM CHEST 1 VIEW: CPT

## 2024-04-04 PROCEDURE — 93005 ELECTROCARDIOGRAM TRACING: CPT

## 2024-04-04 PROCEDURE — 99285 EMERGENCY DEPT VISIT HI MDM: CPT

## 2024-04-04 PROCEDURE — 83880 ASSAY OF NATRIURETIC PEPTIDE: CPT

## 2024-04-04 PROCEDURE — 94760 N-INVAS EAR/PLS OXIMETRY 1: CPT

## 2024-04-04 PROCEDURE — 85025 COMPLETE CBC W/AUTO DIFF WBC: CPT

## 2024-04-04 PROCEDURE — G0493 RN CARE EA 15 MIN HH/HOSPICE: HCPCS

## 2024-04-04 ASSESSMENT — FIBROSIS 4 INDEX: FIB4 SCORE: 2.25

## 2024-04-04 NOTE — DISCHARGE SUMMARY
CC/HPI:  Santos Wilson Jr. is a 51 year old male that presents for     HISTORY OF PRESENT ILLNESS:  51     Has had diarrhea     Has cut back on drinking         Patient Active Problem List   Diagnosis    Allergic rhinitis, cause unspecified    Reflux esophagitis    Headache(784.0)    Depression with anxiety    Trigeminal neuralgia    Essential hypertension    Alcohol abuse    Lumbar back pain    Lumbar radiculopathy, chronic    Coccyx pain    Facet hypertrophy of lumbar region    Lumbar disc disease with radiculopathy    Abnormal MRI, lumbar spine    Elevated liver function tests    Biliuria    Hyperlipidemia, mixed    Erectile dysfunction    Elevated glucose    Macrocytosis    Nevus    Bilateral dry eyes    Myopia of both eyes with astigmatism and presbyopia    UTI (urinary tract infection)    C. difficile diarrhea    Hypomagnesemia    Anemia    Low folate    B12 deficiency    Peripheral edema    Esophageal reflux    Tubular adenoma    Hepatic steatosis       Past Medical History:   Diagnosis Date    Alcohol abuse     Anxiety and depression     Esophageal reflux     Essential hypertension 09/01/2019    Headache(784.0)     CTTH w/ MOH (Advil)    Reflux esophagitis     Sleep apnea     wears cpap per patient       Past Surgical History:   Procedure Laterality Date    Colonoscopy diagnostic  05/22/2023    Abnormal colonoscopy- tubular adenoma polyps were removed, 1 year colonoscopy recall with DUAL PREP. Dr. Chappell    Colonoscopy diagnostic  09/18/2023    Ta colon history of colonic polyps. Normal colonoscopy. Recall colonoscopy in 5 years d/t prior hx of polyp..    Joint replacement Left     Hip    Loop recorder implant      loop recorder implant placed in 2013?  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  Had no complications during the surgery. Did well post-operatively.               There are no active hospital problems to display for this patient.      Uneventful hospital course.     Medication List      START taking these medications      Instructions   oxyCODONE immediate-release 5 MG Tabs  Commonly known as:  ROXICODONE   Take 1 Tab by mouth every four hours as needed for up to 14 days.  Dose:  5 mg        CHANGE how you take these medications      Instructions   terconazole 0.8 % vaginal cream  What changed:  · how much to take  · when to take this  · additional instructions  Commonly known as:  TERAZOL 3   Apply 2 times a day to yeast infection        CONTINUE taking these medications      Instructions   acetaminophen 500 MG Tabs  Commonly known as:  TYLENOL   Take 1,000 mg by mouth 3 times a day. Indications: Pain  Dose:  1000 mg     atenolol 50 MG Tabs  Commonly known as:  TENORMIN   Take 1 Tab by mouth 2 times a day.  Dose:  50 mg     bacitracin-polymyxin b 500-52441 UNIT/GM Oint  Commonly known as:  POLYSPORIN   Apply 1 Each to affected area(s) 2 times a day.  Dose:  1 Each     enoxaparin 80 MG/0.8ML Soln inj  Commonly known as:  LOVENOX   Doctor's comments:  This rx was submitted by a pharmacist working under a collaborative practice agreement.  Inject 80 mg as instructed every 12 hours.  Dose:  80 mg     estradiol 2 MG Tabs  Commonly known as:  ESTRACE   TAKE ONE TABLET BY MOUTH EVERY DAY     furosemide 40 MG Tabs  Commonly known as:  LASIX   Take 80 mg by mouth every day.  Dose:  80 mg     ipratropium 0.03 % Soln  Commonly known as:  ATROVENT   Spray 2 Sprays in nose every 12 hours.  Dose:  2 Spray     levothyroxine 50 MCG Tabs  Commonly known as:  SYNTHROID   TAKE 1 TABLET BY MOUTH EVERY MORNING ON AN EMPTY STOMACH.     lisinopril 5 MG Tabs  Commonly known as:  PRINIVIL   TAKE ONE TABLET BY MOUTH EVERY DAY     Lutein 20 MG Caps   Take 1 Cap by mouth   degenerative discs in the back    Cataracts Mother     Cancer Father           esophagial  cancer      age 62  - smoker    Asthma Brother     Alcohol Abuse Brother     Asthma Maternal Grandmother     Hypertension Maternal Grandfather     Ophthalmology Maternal Grandfather         detached retin    NEGATIVE FAMILY HX OF Paternal Grandmother     Retinal detachment Paternal Grandfather     Cancer, Prostate Paternal Grandfather         65    Heart disease Paternal Grandfather        Social History     Socioeconomic History    Marital status: /Civil Union     Spouse name: Not on file    Number of children: Not on file    Years of education: Not on file    Highest education level: Not on file   Occupational History    Occupation:      Employer: [employer]   Tobacco Use    Smoking status: Former     Current packs/day: 0.00     Average packs/day: 1 pack/day for 13.0 years (13.0 ttl pk-yrs)     Types: Cigarettes     Start date: 1984     Quit date: 1997     Years since quittin.2     Passive exposure: Past    Smokeless tobacco: Never   Vaping Use    Vaping Use: never used   Substance and Sexual Activity    Alcohol use: Not Currently     Comment: 5 glasses of whiskey a day since he was 11 years old    Drug use: Never    Sexual activity: Yes     Comment: no HIV risk   Other Topics Concern    Not on file   Social History Narrative    Not on file     Social Determinants of Health     Financial Resource Strain: Low Risk  (2023)    Financial Resource Strain     Unable to Get: None   Food Insecurity: Not At Risk (2023)    Food Insecurity     Food Insecurity: Worried or Stressed about Money for Food: Never   Transportation Needs: Not At Risk (2023)    PRAPARE - Transportation     Lack of Transportation (Medical): No     Lack of Transportation (Non-Medical): No   Physical Activity: Not on file   Stress: Not on file   Social Connections: Low Risk  (2023)    Social  every day.  Dose:  1 Cap     magnesium oxide 400 (241.3 Mg) MG Tabs tablet  Commonly known as:  MAG-OX   Take 1 Tab by mouth every day.  Dose:  400 mg     Mirabegron ER 50 MG Tb24   Doctor's comments:  From Urologist  Take 1 Tab by mouth every day.  Dose:  1 Tab     PREBIOTIC PRODUCT PO   Take 2 Tabs by mouth every day.  Dose:  2 Tab     raNITidine 150 MG Tabs  Commonly known as:  ZANTAC   TAKE ONE TABLET BY MOUTH TWICE DAILY     sertraline 50 MG Tabs  Commonly known as:  ZOLOFT   Take 1 Tab by mouth every day.  Dose:  50 mg     spironolactone 50 MG Tabs  Commonly known as:  ALDACTONE   TAKE  ONE TABLET BY MOUTH EVERY MORNING AND EVERY EVENING     vitamin D 1000 UNIT Tabs  Commonly known as:  cholecalciferol   Take 2,000 Units by mouth every day.  Dose:  2000 Units            Patient will be discharged home and follow up with Dr. Mercedes clinic in 2 weeks, for which the patient already has scheduled.      Connections     Social Connectivity: 5 or more times a week   Interpersonal Safety: Not At Risk (10/5/2023)    Interpersonal Safety     Social Determinants: Intimate Partner Violence Past Fear: No     Social Determinants: Intimate Partner Violence Current Fear: No       Current Outpatient Medications   Medication Sig Dispense Refill    folic acid (FOLATE) 1 MG tablet Take 1 tablet by mouth daily. Indications: FOLATE DEFICIENCY 90 tablet 0    Cyanocobalamin (B-12) 1000 MCG Tab Take 2 tablets by mouth daily. 90 tablet 1    albuterol 108 (90 Base) MCG/ACT inhaler Inhale 2 puffs into the lungs every 6 hours as needed for Wheezing. 8.5 g 3    pantoprazole (PROTONIX) 40 MG tablet Take 1 tablet by mouth daily. 90 tablet 1    losartan (COZAAR) 100 MG tablet Take 1 tablet by mouth daily. 90 tablet 1    furosemide (LASIX) 20 MG tablet Take 1 tablet by mouth daily. 90 tablet 1    nalTREXone (REVIA) 50 MG tablet Take 1 tablet by mouth daily. 30 tablet 2    gabapentin (NEURONTIN) 300 MG capsule Take 2 capsules by mouth at bedtime. 60 capsule 2    baclofen (LIORESAL) 10 MG tablet Take 0.5-1 tablets by mouth 3 times daily as needed (spasms). Take half a tablet to 1 tablet by mouth as needed for muscle spasms. Max 3 tablets/day. 90 tablet 2    sildenafil (REVATIO) 20 MG tablet Take 1-2 tabs po 1 hr before intercourse prn 10 tablet 2    fluticasone (FLONASE) 50 MCG/ACT nasal spray Spray 1 spray in each nostril daily as needed (for congestion).       No current facility-administered medications for this visit.       ALLERGIES:   Allergen Reactions    Carbamazepine RASH    Amitriptyline Hcl      Very irritable    Penicillins      hives    Tizanidine Other (See Comments)     Low blood pressure       REVIEW OF SYSTEMS:  {ROS negative except HPI:114480}  General - Denies fever, chills, sweats, fatigue.  Skin - Denies new or changing lesions.  Heent - Denies upper respiratory symptoms.  Neck - Denies pain or masses.  Heart - Denies chest  pain, palpitations,shortness of breath.  Lungs - Denies cough, wheezing.  Abdomen - Denies pain, nausea, vomiting, diarrhea, changes in or problem with bowel habits, blood in stool.  Extremities - Denies joint pain, muscle pain or swelling.  Genitourinary - Denies urinary symptoms, dysuria, frequency, urgency, incontinence.  Lymphatic - Denies lymph node swelling.  Hematologic - Denies easy bleeding or bruising.  Neurologic - Denies headache,  visual symptoms, numbness, paresthesias, weakness.  Psychiatric- Denies suicidal/homicidal ideation, hallucinations, delusions, depression.    PHYSICAL EXAM:  Visit Vitals  /84   Pulse 74   Temp 98 °F (36.7 °C)   Resp 18   Ht 6' (1.829 m)   Wt 134.4 kg (296 lb 4.8 oz)   SpO2 97%   BMI 40.19 kg/m²     General: Alert, in no acute distress.  Skin: Warm with normal turgor, no rash.  Head: Atraumatic and normocephalic.  Eyes: Eyelids and conjunctivae appear normal, EOM normal, BRIE.  Nose: Normal and patent, no erythema, discharge or polyps.  Ears: R - Tympanic membrane is clear and normal appearing and canals normal.            L - Tympanic membrane is clear and normal appearing and canals normal.  Throat: Oropharynx is clear, no redness or exudate present.  Neck: Supple with no significant adenopathy, no thyromegaly.  No bruits or jugular venous distention.  Lungs: Clear to auscultation, no wheezing, crackles or rhonchi noted.  Heart: Regular rate and rhythm.  S1, S2, no murmur present, no extra sounds.  Abdomen: Soft, nondistended, nontender, no guarding or masses, normoactive bowel   sounds, no abdominal bruits. No hepatosplenomegaly.  Musculoskeletal: Spine normal.  Extremities: Full range of motion upper and lower extremities, with normal appearing joints.  No edema, normal strength in all extremities.  Neurologic: No focal deficits, cranial nerves II - XII grossly intact, normal sensation to soft touch, normal balance.  Lymphatic: No cervical or axillary  adenopathy.  Psychiatric:  Normal mood and affect.    There are no diagnoses linked to this encounter.

## 2024-04-05 VITALS
DIASTOLIC BLOOD PRESSURE: 59 MMHG | BODY MASS INDEX: 31.09 KG/M2 | TEMPERATURE: 97 F | HEART RATE: 80 BPM | OXYGEN SATURATION: 92 % | RESPIRATION RATE: 18 BRPM | HEIGHT: 64 IN | WEIGHT: 182.1 LBS | SYSTOLIC BLOOD PRESSURE: 141 MMHG

## 2024-04-05 LAB
ALBUMIN SERPL BCP-MCNC: 4.2 G/DL (ref 3.2–4.9)
ALBUMIN/GLOB SERPL: 1.3 G/DL
ALP SERPL-CCNC: 100 U/L (ref 30–99)
ALT SERPL-CCNC: 11 U/L (ref 2–50)
ANION GAP SERPL CALC-SCNC: 14 MMOL/L (ref 7–16)
AST SERPL-CCNC: 19 U/L (ref 12–45)
BILIRUB SERPL-MCNC: 0.4 MG/DL (ref 0.1–1.5)
BUN SERPL-MCNC: 27 MG/DL (ref 8–22)
CALCIUM ALBUM COR SERPL-MCNC: 9.1 MG/DL (ref 8.5–10.5)
CALCIUM SERPL-MCNC: 9.3 MG/DL (ref 8.5–10.5)
CHLORIDE SERPL-SCNC: 101 MMOL/L (ref 96–112)
CO2 SERPL-SCNC: 19 MMOL/L (ref 20–33)
CREAT SERPL-MCNC: 0.93 MG/DL (ref 0.5–1.4)
GLOBULIN SER CALC-MCNC: 3.2 G/DL (ref 1.9–3.5)
GLUCOSE SERPL-MCNC: 157 MG/DL (ref 65–99)
POTASSIUM SERPL-SCNC: 4.9 MMOL/L (ref 3.6–5.5)
PROT SERPL-MCNC: 7.4 G/DL (ref 6–8.2)
SODIUM SERPL-SCNC: 134 MMOL/L (ref 135–145)

## 2024-04-05 PROCEDURE — 96374 THER/PROPH/DIAG INJ IV PUSH: CPT

## 2024-04-05 PROCEDURE — 700111 HCHG RX REV CODE 636 W/ 250 OVERRIDE (IP): Mod: JZ | Performed by: EMERGENCY MEDICINE

## 2024-04-05 RX ORDER — FUROSEMIDE 10 MG/ML
40 INJECTION INTRAMUSCULAR; INTRAVENOUS ONCE
Status: COMPLETED | OUTPATIENT
Start: 2024-04-05 | End: 2024-04-05

## 2024-04-05 RX ADMIN — FUROSEMIDE 40 MG: 10 INJECTION INTRAMUSCULAR; INTRAVENOUS at 00:32

## 2024-04-05 NOTE — ED PROVIDER NOTES
"ER Provider Note    Scribed for Dr. Joceline Apple D.O. by Julio César Daniels. 4/4/2024  11:26 PM    Primary Care Provider: Jolie Escobar M.D.    CHIEF COMPLAINT  Chief Complaint   Patient presents with    Shortness of Breath       EXTERNAL RECORDS REVIEWED  None    HPI/ROS  LIMITATION TO HISTORY   Select: : None    OUTSIDE HISTORIAN(S):  None    Donte Magaña is a 83 y.o. female who presents to the ED via ambulance for shortness of breath onset prior to arrival. Patient states she takes Furosemide and may have missed her dose.  She seems somewhat confused and states that her daughter puts her meds in her pill container.  She repots having shortness of breath which prompted her to call EMS. She she has been having a cough, but denies phlegm or anything tasting bad in her throat that began today. Patient was given a breathing treatment en route and she reports feeling improved by it. No alleviating or exacerbating factors noted.    PAST MEDICAL HISTORY  Past Medical History:   Diagnosis Date    A-fib (HCC)     Anesthesia     \"Tachycardia for 5 days after cataract surgery\"    Anticoagulant long-term use 1/12/2012    Arthritis     osteo-Knees, hips    Atrial fibrillation (HCC)     Backpain     R hip    Bowel habit changes     diarrhea    Breath shortness     with exertion, prn O2 2L    Bronchitis Nov, 2013    CAD (coronary artery disease)     Depression     Glaucoma 5/3/2011    Hematoma complicating a procedure 11/3/2012    Hemorrhagic disorder (HCC)     bruising/coumadin    Hypertension     Hypothyroid     Lupus (HCC)     Macular degeneration     Menopause 1/12/2012    Mitral regurgitation 10/30/2012    Obesity 1/12/2012    Pacemaker 2018    Pneumonia feb,2013    Pre-syncope 6/29/2018    Pulmonary hypertension (HCC) 10/30/2012    PVC's (premature ventricular contractions) 1/12/2012    Senile nuclear sclerosis     Spinal stenosis of lumbar region at multiple levels     Unspecified cataract     repaired bilateral    " Unspecified urinary incontinence     Urinary bladder disorder        SURGICAL HISTORY  Past Surgical History:   Procedure Laterality Date    HI COMBINED ANT/POST COLPORRHAPHY  1/14/2020    Procedure: COLPORRHAPHY, COMBINED ANTEROPOSTERIOR - PERINEOPLASTY;  Surgeon: Waqas Robin M.D.;  Location: SURGERY SAME DAY [facility];  Service: Gynecology    HI LAP,DIAGNOSTIC ABDOMEN  1/14/2020    Procedure: PELVISCOPY;  Surgeon: Waqas Robin M.D.;  Location: SURGERY SAME DAY [facility];  Service: Gynecology    ENTEROCELE REPAIR  1/14/2020    Procedure: REPAIR, ENTEROCELE;  Surgeon: Waqas Robin M.D.;  Location: SURGERY SAME DAY [facility];  Service: Gynecology    BLADDER SLING FEMALE  1/14/2020    Procedure: BLADDER SLING, FEMALE - TOT;  Surgeon: Waqas Robin M.D.;  Location: SURGERY SAME DAY [facility];  Service: Gynecology    VAGINAL SUSPENSION  1/14/2020    Procedure: COLPOPEXY - SACROSPINOUS VAULT SUSPENSION;  Surgeon: Waqas Robin M.D.;  Location: SURGERY SAME DAY [facility];  Service: Gynecology    SALPINGO OOPHORECTOMY Bilateral 1/14/2020    Procedure: SALPINGO-OOPHORECTOMY;  Surgeon: Waqas Robin M.D.;  Location: SURGERY SAME DAY [facility];  Service: Gynecology    IRRIGATION & DEBRIDEMENT ORTHO Right 2/14/2019    Procedure: IRRIGATION & DEBRIDEMENT ORTHO-HIP WOUND ;  Surgeon: Vladimir Lee M.D.;  Location: [facility];  Service: Orthopedics    HIP ARTH ANTERIOR TOTAL Right 1/17/2019    Procedure: HIP ARTHROPLASTY ANTERIOR TOTAL;  Surgeon: Juan C Mercedes M.D.;  Location: [facility];  Service: Orthopedics    PACEMAKER INSERTION  06/30/2018    Dual Chamber    KNEE ARTHROPLASTY TOTAL Right 6/23/2016    Procedure: KNEE ARTHROPLASTY TOTAL;  Surgeon: Heriberto Lozada M.D.;  Location: [facility];  Service:     KNEE ARTHROPLASTY TOTAL Left 5/28/2015    Procedure: KNEE ARTHROPLASTY TOTAL;  Surgeon: Heriberto Lozada M.D.;  Location: [facility]  ORS;  Service:     CATARACT PHACO WITH IOL Right 5/5/2015    Procedure: IOL OD - STANDARD;  Surgeon: Dmitry Bejarano M.D.;  Location: SURGERY [facility];  Service:     CATARACT PHACO WITH IOL  4/21/2015    Performed by Dmitry Bejarano M.D. at SURGERY [facility] ORS    RECOVERY  11/30/2010    Performed by SURGERY, CATH-RECOVERY at SURGERY SAME DAY [facility] ORS    COLONOSCOPY  2008    Normal    [facility]    ABDOMINAL HYSTERECTOMY TOTAL  April 15,1975    still has ovaries    OPEN REDUCTION      left ankle    OTHER      Removed pins from left ankle    OTHER CARDIAC SURGERY  12/2017 and 07/19/2018     Cardiac Ablation    TONSILLECTOMY AND ADENOIDECTOMY         FAMILY HISTORY  Family History   Problem Relation Age of Onset    Stroke Mother     Diabetes Father     Stroke Sister     Heart Disease Brother     Stroke Sister     GI Disease Daughter         Crohn's Disease    Heart Disease Daughter         CHF    Other Daughter         Chronic Pain--Lymphedema    Cancer Paternal Aunt         breast       SOCIAL HISTORY   reports that she has never smoked. She has never used smokeless tobacco. She reports that she does not drink alcohol and does not use drugs.    CURRENT MEDICATIONS  Discharge Medication List as of 4/5/2024  2:35 AM        CONTINUE these medications which have NOT CHANGED    Details   Mirabegron ER (MYRBETRIQ) 50 MG TABLET SR 24 HR Take 50 mg by mouth every day. Indications: Urinary Incontinence, Disp-90 Tablet, R-0, Normal      magnesium oxide (MAG-OX) 400 MG Tab tablet Take 1 Tablet by mouth every day. Indications: Acid Indigestion, Disp-90 Tablet, R-0, Normal      lisinopril (PRINIVIL) 5 MG Tab TAKE 1 TABLET BY MOUTH EVERY DAY, Disp-100 Tablet, R-0, Normal      Lidocaine 4 % Aerosol Apply 1 Spray topically 3 times a day as needed (Pain). Indications: pain, Historical Med      diphenhydrAMINE (BENADRYL) 25 MG Tab Take 50 mg by mouth at bedtime as needed for Sleep. Indications: sleep,  Historical Med      ipratropium (ATROVENT) 0.03 % Solution Administer 2 Sprays into affected nostril(S) every day. One spray in each nostril  Indications: Runny Nose, Historical Med      Fluticasone Propionate, Inhal, 50 MCG/ACT AEROSOL POWDER, BREATH ACTIVATED Administer 2 Sprays into affected nostril(S) every day at 6 PM. One spray in each nostril  Indications: allergies, Historical Med      trolamine salicylate (ASPERCREME OR MYOFLEX) 10 % cream Apply 1 Application topically 4 times a day as needed (Pain). Indications: pain, Historical Med      DIPHENHYDRAMINE HCL, TOPICAL, 2 % Gel Apply 1 Application topically 4 times a day as needed (itching). Indications: itching, Historical Med      Neomycin-Bacitracin-Polymyxin (HCA TRIPLE ANTIBIOTIC OINTMENT EX) Apply 1 Application topically 3 times a day as needed (For cuts and burns healing). Indications: scrapes and burns for healing, Historical Med      Lutein 20 MG Cap Take 1 Capsule by mouth every day. take 40 mg, Disp-90 Capsule, R-3, Normal      sertraline (ZOLOFT) 50 MG Tab Take 1 Tablet by mouth every day. Indications: Major Depressive Disorder, Disp-90 Tablet, R-0, Normal      levothyroxine (SYNTHROID) 50 MCG Tab Take 1 Tablet by mouth every morning on an empty stomach. Indications: Underactive Thyroid, Disp-90 Tablet, R-3, Normal      calcium carbonate (TUMS) 500 MG Chew Tab Chew 1,000 mg 3 times a day as needed (heartburn). Indications: Heartburn, Historical Med      diclofenac sodium (VOLTAREN) 1 % Gel Apply 2 g topically 2 times a day as needed (left hip pain). Indications: pain, Historical Med      non-formulary med Take 1 Lozenge by mouth as needed (dry mouth). ACT dry mouth lozenges  Indications: dry mouth, Historical Med      rivaroxaban (XARELTO) 20 MG Tab tablet Take 1 Tablet by mouth with dinner., Disp-100 Tablet, R-3, Print Rx Paper      atenolol (TENORMIN) 50 MG Tab Take 1 Tablet by mouth every evening., Disp-100 Tablet, R-3, Normal      furosemide  "(LASIX) 40 MG Tab Take 1 Tablet by mouth 2 times a day., Disp-200 Tablet, R-3, Normal      PREBIOTIC PRODUCT PO Take 2 Tablets by mouth every day. Indications: Suppliment, Historical Med      Sennosides (SENOKOT PO) Take 1 Tablet by mouth 1 time a day as needed (constipation). 8.6 mg  Indications: constipation, Historical Med      Acetaminophen 500 MG Cap Take 2 Caps by mouth 3 times a day as needed. Indications: Pain, Historical Med      Cholecalciferol (VITAMIN D) 2000 UNIT Tab Take 5,000 Units by mouth every day. Indications: supplement, Historical Med             ALLERGIES  Amiodarone, Bactrim, Cipro xr, Metoprolol, Morphine, Phytoplex z-guard [petrolatum-zinc oxide], Pseudoephedrine, Qvar [beclomethasone dipropionate], Vibramycin, Atorvastatin calcium-polysorbate 80, Augmentin, Diltiazem, Flecainide, Keflex, Mucinex, Tramadol, Atorvastatin, and Tape    PHYSICAL EXAM  BP (!) 164/70   Pulse 83   Temp 36.1 °C (97 °F) (Temporal)   Resp 18   Ht 1.626 m (5' 4\")   Wt 82.6 kg (182 lb 1.6 oz)   LMP 01/10/1975   SpO2 93%   BMI 31.26 kg/m²   Constitutional: Patient is a well-developed elderly female in mild distress.  She appears somewhat confused.  HENT: Normocephalic, atraumatic moist oral mucosa.  Nares are patent and clear.  Cardiovascular: Normal heart rate and Regular rhythm. No murmur,  Thorax & Lungs: Clear and equal breath sounds with good excursion. Lungs have diminished air exchange in bases, no rhonchi, wheezing or rales.   Abdomen: Bowel sounds normal in all four quadrants. Soft,nontender, no rebound , guarding, palpable masses.   Skin: Pale, Warm, Dry, No erythema, No rashes.    Extremities: 2+ lower extremity edema, No tenderness,     Neurologic: Alert & oriented x 3, Normal motor function, Normal sensory function,   Psychiatric: Affect normal, judgment is impaired due to mentation.    DIAGNOSTIC STUDIES & PROCEDURES    Labs:   Results for orders placed or performed during the hospital encounter of " 04/04/24   CBC with Differential   Result Value Ref Range    WBC 10.4 4.8 - 10.8 K/uL    RBC 4.29 4.20 - 5.40 M/uL    Hemoglobin 14.2 12.0 - 16.0 g/dL    Hematocrit 42.9 37.0 - 47.0 %    .0 (H) 81.4 - 97.8 fL    MCH 33.1 (H) 27.0 - 33.0 pg    MCHC 33.1 32.2 - 35.5 g/dL    RDW 50.9 (H) 35.9 - 50.0 fL    Platelet Count 246 164 - 446 K/uL    MPV 9.9 9.0 - 12.9 fL    Neutrophils-Polys 68.10 44.00 - 72.00 %    Lymphocytes 21.40 (L) 22.00 - 41.00 %    Monocytes 8.70 0.00 - 13.40 %    Eosinophils 1.10 0.00 - 6.90 %    Basophils 0.40 0.00 - 1.80 %    Immature Granulocytes 0.30 0.00 - 0.90 %    Nucleated RBC 0.00 0.00 - 0.20 /100 WBC    Neutrophils (Absolute) 7.12 1.82 - 7.42 K/uL    Lymphs (Absolute) 2.23 1.00 - 4.80 K/uL    Monos (Absolute) 0.91 (H) 0.00 - 0.85 K/uL    Eos (Absolute) 0.11 0.00 - 0.51 K/uL    Baso (Absolute) 0.04 0.00 - 0.12 K/uL    Immature Granulocytes (abs) 0.03 0.00 - 0.11 K/uL    NRBC (Absolute) 0.00 K/uL   Comp Metabolic Panel   Result Value Ref Range    Sodium 134 (L) 135 - 145 mmol/L    Potassium 4.9 3.6 - 5.5 mmol/L    Chloride 101 96 - 112 mmol/L    Co2 19 (L) 20 - 33 mmol/L    Anion Gap 14.0 7.0 - 16.0    Glucose 157 (H) 65 - 99 mg/dL    Bun 27 (H) 8 - 22 mg/dL    Creatinine 0.93 0.50 - 1.40 mg/dL    Calcium 9.3 8.5 - 10.5 mg/dL    Correct Calcium 9.1 8.5 - 10.5 mg/dL    AST(SGOT) 19 12 - 45 U/L    ALT(SGPT) 11 2 - 50 U/L    Alkaline Phosphatase 100 (H) 30 - 99 U/L    Total Bilirubin 0.4 0.1 - 1.5 mg/dL    Albumin 4.2 3.2 - 4.9 g/dL    Total Protein 7.4 6.0 - 8.2 g/dL    Globulin 3.2 1.9 - 3.5 g/dL    A-G Ratio 1.3 g/dL   proBrain Natriuretic Peptide, NT   Result Value Ref Range    NT-proBNP 1079 (H) 0 - 125 pg/mL   ESTIMATED GFR   Result Value Ref Range    GFR (CKD-EPI) 60 >60 mL/min/1.73 m 2   EKG   Result Value Ref Range    Report       [facility]    Test Date:  2024-04-04  Pt Name:    STEPHANIE ISSAC                 Department: ER  MRN:        [id number]                       Room:        11  Gender:     Female                       Technician: [id number]  :        [id number]                   Requested By:ER TRIAGE PROTOCOL  Order #:    [id number]                    Reading MD:    Measurements  Intervals                                Axis  Rate:       84                           P:          0  AK:         152                          QRS:        -82  QRSD:       127                          T:          97  QT:         410  QTc:        485    Interpretive Statements  Ventricular-paced complexes  No further rhythm analysis attempted due to paced rhythm  Left bundle branch block  Compared to ECG 2022 17:19:53  Left bundle-branch block now present       *Note: Due to a large number of results and/or encounters for the requested time period, some results have not been displayed. A complete set of results can be found in Results Review.       All labs reviewed by me.    EKG:   I have independently interpreted this EKG as shown above     Radiology:   The attending Emergency Physician has independently interpreted the diagnostic imaging associated with this visit and is awaiting the final reading from the radiologist, which will be displayed below.    Preliminary interpretation is a follows: Cardiomegaly, and increased pulmonary vasculature with no focal infiltrates.  Radiologist interpretation:    DX-CHEST-PORTABLE (1 VIEW)   Final Result         1.  Bilateral basilar atelectasis, no focal infiltrate   2.  Cardiomegaly   3.  Atherosclerosis           COURSE & MEDICAL DECISION MAKING    ED Observation Status? No; Patient does not meet criteria for ED Observation.     INITIAL ASSESSMENT AND PLAN  Care Narrative:       11:26 PM - Patient seen and evaluated at bedside. Discussed plan of care, including labs and imaging. Patient agrees to plan of care. Patient will be treated with Lasix injection 40 mg for her symptoms. Ordered ProBrain Natriuretic Peptide, NT, eGFR, CBC  with diff, CMP and DX-Chest Portable to evaluate. Differential diagnoses include but are not limited to: Pneumonia Vs. CHF    Chest x-ray shows increased pulmonary vasculature and her BNP was 1079.  I gave her 40 mg of Lasix for this to help diurese and she was diuresing tremendously.  Her BUN and creatinine are 27 and 0.93 respectively.  Glucose 157 white count is normal H&H is stable.  She has required no other treatment other than the Lasix in the department.  Her saturations are in the mid 90s and she has no signs of respiratory distress.  My plan will be to discharge her back to her home in stable condition to return if any problems.  She is to make sure she takes her diuretics daily due to her cardiac history.  She is stable upon discharge    2:12 AM- I reevaluated the patient at this time.Thy report they have no trouble breathing. I informed them to make sure to take all their medications daily including water pill to ensure fluid is removed from their lungs. Patient had the opportunity to ask any questions. The plan for discharge was discussed with them and they were told to return for any new or worsening symptoms. She was also informed of the plans for follow up. Patient is understanding and agreeable to the plan for discharge.    ADDITIONAL PROBLEM LIST AND DISPOSITION  A-fib, hypothyroid, hypertension, depression               DISPOSITION AND DISCUSSIONS  I have discussed management of the patient with the following physicians and DANNY's: None    Discussion of management with other QHP or appropriate source(s): None     Escalation of care considered, and ultimately not performed: acute inpatient care management, however at this time, the patient is most appropriate for outpatient management.    Barriers to care at this time, including but not limited to:  None .     The patient will return for new or worsening symptoms and is stable at the time of discharge.    The patient is referred to a primary  physician for blood pressure management, diabetic screening, and for all other preventative health concerns.      DISPOSITION:  Patient will be discharged home in stable condition.    FOLLOW UP:  Jolie Escobar M.D.  [address]  [address]  [place] [id number] [phone]    Schedule an appointment as soon as possible for a visit in 1 week        FINAL IMPRESSION   1. Shortness of breath    2. Elevated brain natriuretic peptide (BNP) level    3. History of CHF (congestive heart failure)      Julio César HERNANDEZ (Mandeep), am scribing for, and in the presence of, Joceline Apple D.O..    Electronically signed by: Julio César Daniels (Mandeep), 4/4/2024    Joceline HERNANDEZ D.O. personally performed the services described in this documentation, as scribed by Julio César Daniels in my presence, and it is both accurate and complete.    The note accurately reflects work and decisions made by me.  Joceline Apple D.O.  4/5/2024  4:43 AM

## 2024-04-05 NOTE — DISCHARGE PLANNING
Medical Social Work     SW received a text requesting SW assistance with Medical Transport back to [place] at [facility]: [address], [place]. MATY spoke to Liliam and advised her we will be sending back the pt to there facility. Liliam advised SW to have transport call there facility of [phone] to have them open the door.     MATY called [facility] and set up transport for 0230. RN aware of transport time.     Cost: $137.76  Authorization #[id number]

## 2024-04-05 NOTE — DISCHARGE INSTRUCTIONS
Make sure that you take all of your medications daily including your water pill to get the fluid out of your lungs.  Return if any problems or worsening.

## 2024-04-05 NOTE — ED NOTES
SOB x's one day. Pt does take Furosemide and may have missed her dose. Pt had bilateral wheezing and received an albuterol ad duoneb treatment enroute. Pt is in NAD at this time.

## 2024-04-05 NOTE — ED TRIAGE NOTES
Pt BIB [facility] for SOB x's one day. Pt does take Furosemide and may have missed her dose. Pt had bilateral wheezing and received an albuterol ad duoneb treatment enroute. Pt is in NAD at this time. Connected to BS monitor and call light in reach

## 2024-04-07 ENCOUNTER — APPOINTMENT (OUTPATIENT)
Dept: RADIOLOGY | Facility: MEDICAL CENTER | Age: 84
DRG: 189 | End: 2024-04-07
Attending: EMERGENCY MEDICINE
Payer: MEDICARE

## 2024-04-07 ENCOUNTER — HOSPITAL ENCOUNTER (INPATIENT)
Facility: MEDICAL CENTER | Age: 84
LOS: 5 days | DRG: 189 | End: 2024-04-12
Attending: EMERGENCY MEDICINE | Admitting: STUDENT IN AN ORGANIZED HEALTH CARE EDUCATION/TRAINING PROGRAM
Payer: MEDICARE

## 2024-04-07 VITALS
RESPIRATION RATE: 17 BRPM | DIASTOLIC BLOOD PRESSURE: 66 MMHG | OXYGEN SATURATION: 95 % | SYSTOLIC BLOOD PRESSURE: 126 MMHG | HEART RATE: 82 BPM | TEMPERATURE: 97.8 F

## 2024-04-07 DIAGNOSIS — I50.31 ACUTE HEART FAILURE WITH PRESERVED EJECTION FRACTION (HFPEF) (HCC): ICD-10-CM

## 2024-04-07 DIAGNOSIS — J96.01 ACUTE RESPIRATORY FAILURE WITH HYPOXIA (HCC): ICD-10-CM

## 2024-04-07 DIAGNOSIS — F32.A DEPRESSION, UNSPECIFIED DEPRESSION TYPE: ICD-10-CM

## 2024-04-07 DIAGNOSIS — J18.9 PNEUMONIA DUE TO INFECTIOUS ORGANISM, UNSPECIFIED LATERALITY, UNSPECIFIED PART OF LUNG: ICD-10-CM

## 2024-04-07 DIAGNOSIS — L89.152 PRESSURE INJURY OF SACRAL REGION, STAGE 2 (HCC): ICD-10-CM

## 2024-04-07 DIAGNOSIS — I35.1 AORTIC VALVE INSUFFICIENCY, ETIOLOGY OF CARDIAC VALVE DISEASE UNSPECIFIED: ICD-10-CM

## 2024-04-07 DIAGNOSIS — M48.00 SPINAL STENOSIS, MULTILEVEL: ICD-10-CM

## 2024-04-07 DIAGNOSIS — J47.1 BRONCHIECTASIS WITH ACUTE EXACERBATION (HCC): ICD-10-CM

## 2024-04-07 DIAGNOSIS — R05.1 ACUTE COUGH: ICD-10-CM

## 2024-04-07 PROBLEM — Z71.89 ADVANCED CARE PLANNING/COUNSELING DISCUSSION: Status: ACTIVE | Noted: 2022-10-24

## 2024-04-07 LAB
ALBUMIN SERPL BCP-MCNC: 4.6 G/DL (ref 3.2–4.9)
ALBUMIN/GLOB SERPL: 1.4 G/DL
ALP SERPL-CCNC: 91 U/L (ref 30–99)
ALT SERPL-CCNC: 15 U/L (ref 2–50)
ANION GAP SERPL CALC-SCNC: 12 MMOL/L (ref 7–16)
AST SERPL-CCNC: 26 U/L (ref 12–45)
BASOPHILS # BLD AUTO: 0.4 % (ref 0–1.8)
BASOPHILS # BLD: 0.03 K/UL (ref 0–0.12)
BILIRUB SERPL-MCNC: 0.6 MG/DL (ref 0.1–1.5)
BUN SERPL-MCNC: 19 MG/DL (ref 8–22)
CALCIUM ALBUM COR SERPL-MCNC: 9.4 MG/DL (ref 8.5–10.5)
CALCIUM SERPL-MCNC: 9.9 MG/DL (ref 8.5–10.5)
CHLORIDE SERPL-SCNC: 97 MMOL/L (ref 96–112)
CO2 SERPL-SCNC: 24 MMOL/L (ref 20–33)
CREAT SERPL-MCNC: 0.72 MG/DL (ref 0.5–1.4)
CRP SERPL HS-MCNC: 1.72 MG/DL (ref 0–0.75)
D DIMER PPP IA.FEU-MCNC: 1.12 UG/ML (FEU) (ref 0–0.5)
EKG IMPRESSION: NORMAL
EOSINOPHIL # BLD AUTO: 0.13 K/UL (ref 0–0.51)
EOSINOPHIL NFR BLD: 1.7 % (ref 0–6.9)
ERYTHROCYTE [DISTWIDTH] IN BLOOD BY AUTOMATED COUNT: 48.8 FL (ref 35.9–50)
FLUAV RNA SPEC QL NAA+PROBE: NEGATIVE
FLUBV RNA SPEC QL NAA+PROBE: NEGATIVE
GFR SERPLBLD CREATININE-BSD FMLA CKD-EPI: 82 ML/MIN/1.73 M 2
GLOBULIN SER CALC-MCNC: 3.4 G/DL (ref 1.9–3.5)
GLUCOSE SERPL-MCNC: 133 MG/DL (ref 65–99)
HCT VFR BLD AUTO: 41.5 % (ref 37–47)
HGB BLD-MCNC: 14.3 G/DL (ref 12–16)
IMM GRANULOCYTES # BLD AUTO: 0.03 K/UL (ref 0–0.11)
IMM GRANULOCYTES NFR BLD AUTO: 0.4 % (ref 0–0.9)
LACTATE SERPL-SCNC: 1.1 MMOL/L (ref 0.5–2)
LYMPHOCYTES # BLD AUTO: 1.37 K/UL (ref 1–4.8)
LYMPHOCYTES NFR BLD: 18.4 % (ref 22–41)
MAGNESIUM SERPL-MCNC: 1.8 MG/DL (ref 1.5–2.5)
MAGNESIUM SERPL-MCNC: 2.1 MG/DL (ref 1.5–2.5)
MCH RBC QN AUTO: 34 PG (ref 27–33)
MCHC RBC AUTO-ENTMCNC: 34.5 G/DL (ref 32.2–35.5)
MCV RBC AUTO: 98.8 FL (ref 81.4–97.8)
MONOCYTES # BLD AUTO: 0.65 K/UL (ref 0–0.85)
MONOCYTES NFR BLD AUTO: 8.7 % (ref 0–13.4)
NEUTROPHILS # BLD AUTO: 5.22 K/UL (ref 1.82–7.42)
NEUTROPHILS NFR BLD: 70.4 % (ref 44–72)
NRBC # BLD AUTO: 0 K/UL
NRBC BLD-RTO: 0 /100 WBC (ref 0–0.2)
NT-PROBNP SERPL IA-MCNC: 1015 PG/ML (ref 0–125)
PHOSPHATE SERPL-MCNC: 2.8 MG/DL (ref 2.5–4.5)
PHOSPHATE SERPL-MCNC: 3 MG/DL (ref 2.5–4.5)
PLATELET # BLD AUTO: 222 K/UL (ref 164–446)
PMV BLD AUTO: 9.9 FL (ref 9–12.9)
POTASSIUM SERPL-SCNC: 4.2 MMOL/L (ref 3.6–5.5)
PROCALCITONIN SERPL-MCNC: 0.07 NG/ML
PROT SERPL-MCNC: 8 G/DL (ref 6–8.2)
RBC # BLD AUTO: 4.2 M/UL (ref 4.2–5.4)
RSV RNA SPEC QL NAA+PROBE: NEGATIVE
SARS-COV-2 RNA RESP QL NAA+PROBE: NOTDETECTED
SODIUM SERPL-SCNC: 133 MMOL/L (ref 135–145)
TROPONIN T SERPL-MCNC: 18 NG/L (ref 6–19)
WBC # BLD AUTO: 7.4 K/UL (ref 4.8–10.8)

## 2024-04-07 PROCEDURE — 36415 COLL VENOUS BLD VENIPUNCTURE: CPT

## 2024-04-07 PROCEDURE — 80053 COMPREHEN METABOLIC PANEL: CPT

## 2024-04-07 PROCEDURE — 96375 TX/PRO/DX INJ NEW DRUG ADDON: CPT

## 2024-04-07 PROCEDURE — 700111 HCHG RX REV CODE 636 W/ 250 OVERRIDE (IP): Performed by: STUDENT IN AN ORGANIZED HEALTH CARE EDUCATION/TRAINING PROGRAM

## 2024-04-07 PROCEDURE — 83735 ASSAY OF MAGNESIUM: CPT

## 2024-04-07 PROCEDURE — 84100 ASSAY OF PHOSPHORUS: CPT

## 2024-04-07 PROCEDURE — 93005 ELECTROCARDIOGRAM TRACING: CPT

## 2024-04-07 PROCEDURE — 0241U HCHG SARS-COV-2 COVID-19 NFCT DS RESP RNA 4 TRGT ED POC: CPT

## 2024-04-07 PROCEDURE — 83880 ASSAY OF NATRIURETIC PEPTIDE: CPT

## 2024-04-07 PROCEDURE — 84484 ASSAY OF TROPONIN QUANT: CPT

## 2024-04-07 PROCEDURE — 700101 HCHG RX REV CODE 250: Performed by: EMERGENCY MEDICINE

## 2024-04-07 PROCEDURE — 770020 HCHG ROOM/CARE - TELE (206)

## 2024-04-07 PROCEDURE — 99285 EMERGENCY DEPT VISIT HI MDM: CPT

## 2024-04-07 PROCEDURE — 700117 HCHG RX CONTRAST REV CODE 255: Performed by: EMERGENCY MEDICINE

## 2024-04-07 PROCEDURE — 86140 C-REACTIVE PROTEIN: CPT

## 2024-04-07 PROCEDURE — 99223 1ST HOSP IP/OBS HIGH 75: CPT | Mod: AI,25 | Performed by: STUDENT IN AN ORGANIZED HEALTH CARE EDUCATION/TRAINING PROGRAM

## 2024-04-07 PROCEDURE — 83605 ASSAY OF LACTIC ACID: CPT

## 2024-04-07 PROCEDURE — 71275 CT ANGIOGRAPHY CHEST: CPT

## 2024-04-07 PROCEDURE — 700105 HCHG RX REV CODE 258: Performed by: STUDENT IN AN ORGANIZED HEALTH CARE EDUCATION/TRAINING PROGRAM

## 2024-04-07 PROCEDURE — 84145 PROCALCITONIN (PCT): CPT

## 2024-04-07 PROCEDURE — 85379 FIBRIN DEGRADATION QUANT: CPT

## 2024-04-07 PROCEDURE — 96365 THER/PROPH/DIAG IV INF INIT: CPT

## 2024-04-07 PROCEDURE — 700102 HCHG RX REV CODE 250 W/ 637 OVERRIDE(OP): Performed by: STUDENT IN AN ORGANIZED HEALTH CARE EDUCATION/TRAINING PROGRAM

## 2024-04-07 PROCEDURE — 99497 ADVNCD CARE PLAN 30 MIN: CPT | Performed by: STUDENT IN AN ORGANIZED HEALTH CARE EDUCATION/TRAINING PROGRAM

## 2024-04-07 PROCEDURE — 87040 BLOOD CULTURE FOR BACTERIA: CPT

## 2024-04-07 PROCEDURE — A9270 NON-COVERED ITEM OR SERVICE: HCPCS | Performed by: STUDENT IN AN ORGANIZED HEALTH CARE EDUCATION/TRAINING PROGRAM

## 2024-04-07 PROCEDURE — 700101 HCHG RX REV CODE 250: Performed by: STUDENT IN AN ORGANIZED HEALTH CARE EDUCATION/TRAINING PROGRAM

## 2024-04-07 PROCEDURE — 93005 ELECTROCARDIOGRAM TRACING: CPT | Performed by: EMERGENCY MEDICINE

## 2024-04-07 PROCEDURE — 700111 HCHG RX REV CODE 636 W/ 250 OVERRIDE (IP): Performed by: EMERGENCY MEDICINE

## 2024-04-07 PROCEDURE — 85025 COMPLETE CBC W/AUTO DIFF WBC: CPT

## 2024-04-07 PROCEDURE — 71045 X-RAY EXAM CHEST 1 VIEW: CPT

## 2024-04-07 RX ORDER — BENZONATATE 100 MG/1
100 CAPSULE ORAL 3 TIMES DAILY PRN
Status: DISCONTINUED | OUTPATIENT
Start: 2024-04-07 | End: 2024-04-09

## 2024-04-07 RX ORDER — LEVOTHYROXINE SODIUM 0.05 MG/1
50 TABLET ORAL
Status: DISCONTINUED | OUTPATIENT
Start: 2024-04-08 | End: 2024-04-12 | Stop reason: HOSPADM

## 2024-04-07 RX ORDER — FUROSEMIDE 40 MG/1
40 TABLET ORAL
Status: ON HOLD | COMMUNITY
End: 2024-04-16

## 2024-04-07 RX ORDER — ATENOLOL 50 MG/1
50 TABLET ORAL EVERY EVENING
Status: DISCONTINUED | OUTPATIENT
Start: 2024-04-07 | End: 2024-04-12 | Stop reason: HOSPADM

## 2024-04-07 RX ORDER — LISINOPRIL 5 MG/1
5 TABLET ORAL DAILY
Status: DISCONTINUED | OUTPATIENT
Start: 2024-04-08 | End: 2024-04-12 | Stop reason: HOSPADM

## 2024-04-07 RX ORDER — AZITHROMYCIN 500 MG/5ML
500 INJECTION, POWDER, LYOPHILIZED, FOR SOLUTION INTRAVENOUS EVERY 24 HOURS
Qty: 750 ML | Refills: 0 | Status: DISCONTINUED | OUTPATIENT
Start: 2024-04-07 | End: 2024-04-08

## 2024-04-07 RX ORDER — ACETAMINOPHEN 325 MG/1
650 TABLET ORAL EVERY 6 HOURS PRN
Status: DISCONTINUED | OUTPATIENT
Start: 2024-04-07 | End: 2024-04-12 | Stop reason: HOSPADM

## 2024-04-07 RX ADMIN — IOHEXOL 65 ML: 350 INJECTION, SOLUTION INTRAVENOUS at 15:38

## 2024-04-07 RX ADMIN — AZITHROMYCIN DIHYDRATE 500 MG: 500 INJECTION, POWDER, LYOPHILIZED, FOR SOLUTION INTRAVENOUS at 18:22

## 2024-04-07 RX ADMIN — RIVAROXABAN 20 MG: 20 TABLET, FILM COATED ORAL at 18:14

## 2024-04-07 RX ADMIN — ATENOLOL 50 MG: 50 TABLET ORAL at 18:14

## 2024-04-07 RX ADMIN — CEFTRIAXONE SODIUM 2000 MG: 10 INJECTION, POWDER, FOR SOLUTION INTRAVENOUS at 16:40

## 2024-04-07 SDOH — ECONOMIC STABILITY: FOOD INSECURITY: MEALS PER DAY: 3

## 2024-04-07 ASSESSMENT — COGNITIVE AND FUNCTIONAL STATUS - GENERAL
MOBILITY SCORE: 12
EATING MEALS: A LOT
TURNING FROM BACK TO SIDE WHILE IN FLAT BAD: A LOT
MOVING FROM LYING ON BACK TO SITTING ON SIDE OF FLAT BED: A LOT
MOVING TO AND FROM BED TO CHAIR: A LOT
PERSONAL GROOMING: A LOT
DRESSING REGULAR LOWER BODY CLOTHING: A LOT
HELP NEEDED FOR BATHING: A LOT
SUGGESTED CMS G CODE MODIFIER MOBILITY: CL
WALKING IN HOSPITAL ROOM: A LOT
STANDING UP FROM CHAIR USING ARMS: A LOT
SUGGESTED CMS G CODE MODIFIER DAILY ACTIVITY: CL
CLIMB 3 TO 5 STEPS WITH RAILING: A LOT
TOILETING: A LOT
DAILY ACTIVITIY SCORE: 12
DRESSING REGULAR UPPER BODY CLOTHING: A LOT

## 2024-04-07 ASSESSMENT — LIFESTYLE VARIABLES
TOTAL SCORE: 0
CONSUMPTION TOTAL: NEGATIVE
HAVE YOU EVER FELT YOU SHOULD CUT DOWN ON YOUR DRINKING: NO
TOTAL SCORE: 0
SUBSTANCE_ABUSE: 0
HOW MANY TIMES IN THE PAST YEAR HAVE YOU HAD 5 OR MORE DRINKS IN A DAY: 0
DOES PATIENT WANT TO STOP DRINKING: NO
TOTAL SCORE: 0
HAVE PEOPLE ANNOYED YOU BY CRITICIZING YOUR DRINKING: NO
ALCOHOL_USE: NO
ON A TYPICAL DAY WHEN YOU DRINK ALCOHOL HOW MANY DRINKS DO YOU HAVE: 0
EVER HAD A DRINK FIRST THING IN THE MORNING TO STEADY YOUR NERVES TO GET RID OF A HANGOVER: NO
EVER FELT BAD OR GUILTY ABOUT YOUR DRINKING: NO
AVERAGE NUMBER OF DAYS PER WEEK YOU HAVE A DRINK CONTAINING ALCOHOL: 0

## 2024-04-07 ASSESSMENT — PAIN SCALES - PAIN ASSESSMENT IN ADVANCED DEMENTIA (PAINAD)
TOTALSCORE: 1
CONSOLABILITY: 0 - NO NEED TO CONSOLE.
NEGVOCALIZATION: 1 - OCCASIONAL MOAN OR GROAN. LOW-LEVEL SPEECH WITH A NEGATIVE OR DISAPPROVING QUALITY.
FACIALEXPRESSION: 0 - SMILING OR INEXPRESSIVE.
BODYLANGUAGE: 0 - RELAXED.

## 2024-04-07 ASSESSMENT — ENCOUNTER SYMPTOMS
VOMITING: 0
LOWER EXTREMITY EDEMA: 1
DESCRIPTION OF MEMORY LOSS: SHORT TERM
HEADACHES: 0
PALPITATIONS: 0
SINUS PAIN: 0
NECK PAIN: 0
ABDOMINAL PAIN: 0
FATIGUE: 1
STOOL FREQUENCY: LESS THAN DAILY
SHORTNESS OF BREATH: 1
SHORTNESS OF BREATH: 1
LAST BOWEL MOVEMENT: 66933
DIARRHEA: 0
CHILLS: 0
DRY SKIN: 1
APPETITE LEVEL: GOOD
PERSON REPORTING PAIN: PATIENT
FEVER: 0
BOWEL PATTERN NORMAL: 1
FORGETFULNESS: 1
INSOMNIA: 0
NERVOUS/ANXIOUS: 0
DYSPNEA ACTIVITY LEVEL: AFTER AMBULATING MORE THAN 20 FT
HALLUCINATIONS: 0
EYE PAIN: 0
SPUTUM PRODUCTION: 0
FATIGUES EASILY: 1
CHANGE IN APPETITE: UNCHANGED
BACK PAIN: 0
ORTHOPNEA: 0
HEMOPTYSIS: 0
COUGH: 1
PHOTOPHOBIA: 0
DIZZINESS: 0
NAUSEA: 0
EYE DISCHARGE: 0
DENIES PAIN: 1
DOUBLE VISION: 0

## 2024-04-07 ASSESSMENT — ACTIVITIES OF DAILY LIVING (ADL): OASIS_M1830: 01

## 2024-04-07 ASSESSMENT — PATIENT HEALTH QUESTIONNAIRE - PHQ9
8. MOVING OR SPEAKING SO SLOWLY THAT OTHER PEOPLE COULD HAVE NOTICED. OR THE OPPOSITE, BEING SO FIGETY OR RESTLESS THAT YOU HAVE BEEN MOVING AROUND A LOT MORE THAN USUAL: SEVERAL DAYS
6. FEELING BAD ABOUT YOURSELF - OR THAT YOU ARE A FAILURE OR HAVE LET YOURSELF OR YOUR FAMILY DOWN: NOT AL ALL
3. TROUBLE FALLING OR STAYING ASLEEP OR SLEEPING TOO MUCH: NOT AT ALL
2. FEELING DOWN, DEPRESSED, IRRITABLE, OR HOPELESS: SEVERAL DAYS
7. TROUBLE CONCENTRATING ON THINGS, SUCH AS READING THE NEWSPAPER OR WATCHING TELEVISION: SEVERAL DAYS
SUM OF ALL RESPONSES TO PHQ9 QUESTIONS 1 AND 2: 2
5. POOR APPETITE OR OVEREATING: MORE THAN HALF THE DAYS
4. FEELING TIRED OR HAVING LITTLE ENERGY: SEVERAL DAYS
1. LITTLE INTEREST OR PLEASURE IN DOING THINGS: SEVERAL DAYS
9. THOUGHTS THAT YOU WOULD BE BETTER OFF DEAD, OR OF HURTING YOURSELF: NOT AT ALL
SUM OF ALL RESPONSES TO PHQ QUESTIONS 1-9: 7

## 2024-04-07 ASSESSMENT — CHA2DS2 SCORE
VASCULAR DISEASE: YES
CHF OR LEFT VENTRICULAR DYSFUNCTION: NO
AGE 75 OR GREATER: YES
AGE 65 TO 74: NO
PRIOR STROKE OR TIA OR THROMBOEMBOLISM: NO
HYPERTENSION: YES
DIABETES: NO
SEX: FEMALE
CHA2DS2 VASC SCORE: 5

## 2024-04-07 ASSESSMENT — FIBROSIS 4 INDEX
FIB4 SCORE: 1.98
FIB4 SCORE: 2.57
FIB4 SCORE: 2.57

## 2024-04-07 ASSESSMENT — PAIN DESCRIPTION - PAIN TYPE: TYPE: ACUTE PAIN

## 2024-04-07 NOTE — H&P
Hospital Medicine History & Physical Note    Date of Service  4/7/2024    Primary Care Physician  Jolie Escobar M.D.    Consultants      Specialist Names:     Code Status  Prior    Chief Complaint  Chief Complaint   Patient presents with    Cough     Productive cough started approximately 1 hr ago per patient.     Shortness of Breath       History of Presenting Illness  Donte Magaña is a 83 y.o. female who presented 4/7/2024 with complaint of increased work of breathing and shortness of breath.  The patient was seen here last night for the same, received diuretics and was sent home.  She states she has had increasing shortness of breath and cough was sent here by her facility.  Denies chest pain, fever, shakes, chills, sweats, swelling of her lower extremities.     In the ED, she is afebrile, dyspneic and on room air.  Labs significant for low sodium and elevated glucose.  D-dimer and proBNP are both elevated.  Troponin negative.  EKG showing paced rhythm.  Chest x-ray is negative.  CTA is negative for PE but is showing groundglass opacities concerning for multifocal pneumonia.    I discussed the plan of care with     Review of Systems  ROS    Past Medical History   has a past medical history of A-fib ([facility]), Anesthesia, Anticoagulant long-term use (1/12/2012), Arthritis, Atrial fibrillation ([facility]), Backpain, Bowel habit changes, Breath shortness, Bronchitis (Nov, 2013), CAD (coronary artery disease), Depression, Glaucoma (5/3/2011), Hematoma complicating a procedure (11/3/2012), Hemorrhagic disorder ([facility]), Hypertension, Hypothyroid, Lupus ([facility]), Macular degeneration, Menopause (1/12/2012), Mitral regurgitation (10/30/2012), Obesity (1/12/2012), Pacemaker (2018), Pneumonia (feb,2013), Pre-syncope (6/29/2018), Pulmonary hypertension (HCC) (10/30/2012), PVC's (premature ventricular contractions) (1/12/2012), Senile nuclear sclerosis, Spinal stenosis of lumbar region at multiple levels, Unspecified cataract,  "Unspecified urinary incontinence, and Urinary bladder disorder.    Surgical History   has a past surgical history that includes tonsillectomy and adenoidectomy; recovery (11/30/2010); colonoscopy (2008); abdominal hysterectomy total (April 15,1975); other; cataract phaco with iol (4/21/2015); cataract phaco with iol (Right, 5/5/2015); pacemaker insertion (06/30/2018); open reduction; other cardiac surgery (12/2017 and 07/19/2018 ); hip arth anterior total (Right, 1/17/2019); irrigation & debridement ortho (Right, 2/14/2019); pr combined ant/post colporrhaphy (1/14/2020); pr lap,diagnostic abdomen (1/14/2020); enterocele repair (1/14/2020); bladder sling female (1/14/2020); vaginal suspension (1/14/2020); salpingo oophorectomy (Bilateral, 1/14/2020); knee arthroplasty total (Left, 5/28/2015); and knee arthroplasty total (Right, 6/23/2016).     Family History  family history includes Cancer in her paternal aunt; Diabetes in her father; GI Disease in her daughter; Heart Disease in her brother and daughter; Other in her daughter; Stroke in her mother, sister, and sister.   Family history reviewed with patient. There is no family history that is pertinent to the chief complaint.     Social History   reports that she has never smoked. She has never used smokeless tobacco. She reports that she does not drink alcohol and does not use drugs.    Allergies  Allergies   Allergen Reactions    Amiodarone Hives     Throat and tongue itching    Bactrim Shortness of Breath    Cipro Xr Swelling    Metoprolol Swelling     Causes throat swelling    Morphine Unspecified     Hallucinations    Phytoplex Z-Guard [Petrolatum-Zinc Oxide] Unspecified     \"causes burning\"    Pseudoephedrine Palpitations    Qvar [Beclomethasone Dipropionate] Unspecified     Pressure on heart      Vibramycin Shortness of Breath    Atorvastatin Calcium-Polysorbate 80 Unspecified     Muscle aches      Augmentin Unspecified     Unknown reaction    Diltiazem Rash    " " rash    Flecainide Unspecified     dizziness    Keflex Unspecified     Pt states \"Unsure\".    Mucinex Unspecified     GI Distress      Tramadol Unspecified     crying    Atorvastatin Myalgia    Tape Rash     Paper tape okay       Medications  Prior to Admission Medications   Prescriptions Last Dose Informant Patient Reported? Taking?   Acetaminophen 500 MG Cap 4/6/2024 at PM Family Member Yes Yes   Sig: Take 2 Caps by mouth 3 times a day as needed. Indications: Pain   Cholecalciferol (VITAMIN D) 2000 UNIT Tab 4/6/2024 at PM Family Member Yes Yes   Sig: Take 2,000 Units by mouth 2 times a day. Indications: supplement   DIPHENHYDRAMINE HCL, TOPICAL, 2 % Gel 2 WEEKS at PRN Family Member Yes No   Sig: Apply 1 Application topically 4 times a day as needed (itching). Indications: itching   Fluticasone Propionate, Inhal, 50 MCG/ACT AEROSOL POWDER, BREATH ACTIVATED 4/5/2024 at PRN Family Member Yes No   Sig: Administer 2 Sprays into affected nostril(S) every day at 6 PM. One spray in each nostril  Indications: allergies   Lidocaine 4 % Aerosol 4/6/2024 at AM Family Member Yes Yes   Sig: Apply 1 Spray topically 3 times a day as needed (Pain). Indications: pain   Lutein 20 MG Cap 4/5/2024 at AM Family Member No No   Sig: Take 1 Capsule by mouth every day. take 40 mg   Mirabegron ER (MYRBETRIQ) 50 MG TABLET SR 24 HR 4/6/2024 at AM Family Member No Yes   Sig: Take 50 mg by mouth every day. Indications: Urinary Incontinence   Neomycin-Bacitracin-Polymyxin (HCA TRIPLE ANTIBIOTIC OINTMENT EX) 4/6/2024 at AM Family Member Yes Yes   Sig: Apply 1 Application topically 3 times a day as needed (For cuts and burns healing). Indications: scrapes and burns for healing   PREBIOTIC PRODUCT PO 4/6/2024 at AM Family Member Yes Yes   Sig: Take 2 Tablets by mouth every day. Indications: Suppliment   Sennosides (SENOKOT PO) 4/5/2024 at AM Family Member Yes No   Sig: Take 1 Tablet by mouth 1 time a day as needed (constipation). 8.6 mg  " Indications: constipation   atenolol (TENORMIN) 50 MG Tab 4/6/2024 at PM Family Member No Yes   Sig: Take 1 Tablet by mouth every evening.   calcium carbonate (TUMS) 500 MG Chew Tab 1 WEEK at PRN Family Member Yes No   Sig: Chew 1,000 mg 3 times a day as needed (heartburn). Indications: Heartburn   diclofenac sodium (VOLTAREN) 1 % Gel 2 WEEKS at PRN Family Member Yes No   Sig: Apply 2 g topically 2 times a day as needed (left hip pain). Indications: pain   diphenhydrAMINE (BENADRYL) 25 MG Tab 4/5/2024 at PM Family Member Yes No   Sig: Take 50 mg by mouth at bedtime as needed for Sleep. Indications: sleep   furosemide (LASIX) 40 MG Tab 4/5/2024 at PRN Family Member Yes Yes   Sig: Take 40 mg by mouth 1 time a day as needed. Indications: Edema   levothyroxine (SYNTHROID) 50 MCG Tab 4/6/2024 at AM Family Member No Yes   Sig: Take 1 Tablet by mouth every morning on an empty stomach. Indications: Underactive Thyroid   lisinopril (PRINIVIL) 5 MG Tab 4/6/2024 at AM Family Member No Yes   Sig: TAKE 1 TABLET BY MOUTH EVERY DAY   magnesium oxide (MAG-OX) 400 MG Tab tablet 4/6/2024 at AM Family Member No Yes   Sig: Take 1 Tablet by mouth every day. Indications: Acid Indigestion   rivaroxaban (XARELTO) 20 MG Tab tablet 4/6/2024 at PM Family Member No Yes   Sig: Take 1 Tablet by mouth with dinner.   sertraline (ZOLOFT) 50 MG Tab 4/6/2024 at AM Family Member No Yes   Sig: Take 1 Tablet by mouth every day. Indications: Major Depressive Disorder   trolamine salicylate (ASPERCREME OR MYOFLEX) 10 % cream 4/6/2024 at PM Family Member Yes Yes   Sig: Apply 1 Application topically 4 times a day as needed (Pain). APPLY LEFT HIP  Indications: pain      Facility-Administered Medications: None       Physical Exam  Temp:  [36.2 °C (97.2 °F)] 36.2 °C (97.2 °F)  Pulse:  [80-84] 80  Resp:  [20-28] 28  BP: (109-166)/(56-80) 134/58  SpO2:  [87 %-97 %] 96 %  Blood Pressure : 134/58   Temperature: 36.2 °C (97.2 °F)   Pulse: 80   Respiration: (!) 28    Pulse Oximetry: 96 %       Physical Exam    Laboratory:  Recent Labs     04/04/24 2317 04/07/24  1028   WBC 10.4 7.4   RBC 4.29 4.20   HEMOGLOBIN 14.2 14.3   HEMATOCRIT 42.9 41.5   .0* 98.8*   MCH 33.1* 34.0*   MCHC 33.1 34.5   RDW 50.9* 48.8   PLATELETCT 246 222   MPV 9.9 9.9     Recent Labs     04/04/24 2317 04/07/24  1118   SODIUM 134* 133*   POTASSIUM 4.9 4.2   CHLORIDE 101 97   CO2 19* 24   GLUCOSE 157* 133*   BUN 27* 19   CREATININE 0.93 0.72   CALCIUM 9.3 9.9     Recent Labs     04/04/24 2317 04/07/24  1118   ALTSGPT 11 15   ASTSGOT 19 26   ALKPHOSPHAT 100* 91   TBILIRUBIN 0.4 0.6   GLUCOSE 157* 133*         Recent Labs     04/04/24 2317 04/07/24  1118   NTPROBNP 1079* 1015*         Recent Labs     04/07/24  1118   TROPONINT 18       Imaging:  CT-CTA CHEST PULMONARY ARTERY W/ RECONS   Final Result         1. No CT evidence of pulmonary embolism.      2. Ill-defined groundglass opacities in the bilateral lungs, right more than left, likely multifocal pneumonia.      3. No pleural effusion. No pneumothorax.      DX-CHEST-PORTABLE (1 VIEW)   Final Result      No acute process.              Assessment/Plan:  Justification for Admission Status  I anticipate this patient     Patient will need a Med/Surg bed on EMERGENCY service .  The need is secondary to    No new Assessment & Plan notes have been filed under this hospital service since the last note was generated.  Service: Hospital Medicine      VTE prophylaxis:

## 2024-04-07 NOTE — ASSESSMENT & PLAN NOTE
Patient takes atenolol every evening and Xarelto at home.  Continue with home medications  Followed by cardiology

## 2024-04-07 NOTE — ED NOTES
Lab called, states need recollect of green top. Phleb at bedside, notified and collected green top.     Pt continues to rest in [facility] with stable vitals on 2L nc. Watching TV.

## 2024-04-07 NOTE — ED NOTES
Lab messaged, asked to collect second set of cultures and blue top for D dimer  First set and lactic sent to lab.   Pt placed on 2L NC.   Safety reviewed. Call light within reach. Pt sitting in [facility] with remote control in hand watching tv. Denies additional needs at this time.

## 2024-04-07 NOTE — ED NOTES
Plan of care discussed with pt. Pt states would like to sleep at this time. Lights turned down per request.    Spoke with patient and informed them of the information below. Patient verbalized understanding. No further questions.

## 2024-04-07 NOTE — ED NOTES
ERP at bedside.    Daughter kiley called to update about patient staying overniight.   Pt medicated per MAR>

## 2024-04-07 NOTE — ED PROVIDER NOTES
ED Provider Note    CHIEF COMPLAINT  Chief Complaint   Patient presents with    Cough     Productive cough started approximately 1 hr ago per patient.     Shortness of Breath       EXTERNAL RECORDS REVIEWED  Review of the note from last night by Dr. Apple.  Reviewed cardiac ultrasound/echocardiogram completed on 6/30/2023 with ejection fraction 70%, moderate mitral valve regurgitation, moderate aortic insufficiency    HPI/ROS      Donte Magaña is a 83 y.o. female who presents with complaint of increased work of breathing and shortness of breath.  The patient was seen here last night for the same, received diuretics and was sent home.  She states she has had increasing shortness of breath and cough was sent here by her facility.  Denies chest pain, fever, shakes, chills, sweats, swelling of her lower extremities.    PAST MEDICAL HISTORY   has a past medical history of A-fib ([facility]), Anesthesia, Anticoagulant long-term use (1/12/2012), Arthritis, Atrial fibrillation ([facility]), Backpain, Bowel habit changes, Breath shortness, Bronchitis (Nov, 2013), CAD (coronary artery disease), Depression, Glaucoma (5/3/2011), Hematoma complicating a procedure (11/3/2012), Hemorrhagic disorder ([facility]), Hypertension, Hypothyroid, Lupus ([facility]), Macular degeneration, Menopause (1/12/2012), Mitral regurgitation (10/30/2012), Obesity (1/12/2012), Pacemaker (2018), Pneumonia (feb,2013), Pre-syncope (6/29/2018), Pulmonary hypertension ([facility]) (10/30/2012), PVC's (premature ventricular contractions) (1/12/2012), Senile nuclear sclerosis, Spinal stenosis of lumbar region at multiple levels, Unspecified cataract, Unspecified urinary incontinence, and Urinary bladder disorder.    SURGICAL HISTORY   has a past surgical history that includes tonsillectomy and adenoidectomy; recovery (11/30/2010); colonoscopy (2008); abdominal hysterectomy total (April 15,1975); other; cataract phaco with iol (4/21/2015); cataract phaco with iol (Right, 5/5/2015);  pacemaker insertion (06/30/2018); open reduction; other cardiac surgery (12/2017 and 07/19/2018 ); hip arth anterior total (Right, 1/17/2019); irrigation & debridement ortho (Right, 2/14/2019); combined ant/post colporrhaphy (1/14/2020); lap,diagnostic abdomen (1/14/2020); enterocele repair (1/14/2020); bladder sling female (1/14/2020); vaginal suspension (1/14/2020); salpingo oophorectomy (Bilateral, 1/14/2020); knee arthroplasty total (Left, 5/28/2015); and knee arthroplasty total (Right, 6/23/2016).    FAMILY HISTORY  Family History   Problem Relation Age of Onset    Stroke Mother     Diabetes Father     Stroke Sister     Heart Disease Brother     Stroke Sister     GI Disease Daughter         Crohn's Disease    Heart Disease Daughter         CHF    Other Daughter         Chronic Pain--Lymphedema    Cancer Paternal Aunt         breast       SOCIAL HISTORY  Social History     Tobacco Use    Smoking status: Never    Smokeless tobacco: Never   Vaping Use    Vaping Use: Never used   Substance and Sexual Activity    Alcohol use: No    Drug use: No    Sexual activity: Not Currently     Partners: Male     Birth control/protection: Post-Menopausal       CURRENT MEDICATIONS  Home Medications       Reviewed by Maggei Borges (Pharmacy Tech) on 04/07/24 at 1117  Med List Status: Complete     Medication Last Dose Status   Acetaminophen 500 MG Cap 4/6/2024 Active   atenolol (TENORMIN) 50 MG Tab 4/6/2024 Active   calcium carbonate (TUMS) 500 MG Chew Tab 1 WEEK Active   Cholecalciferol (VITAMIN D) 2000 UNIT Tab 4/6/2024 Active   diclofenac sodium (VOLTAREN) 1 % Gel 2 WEEKS Active   diphenhydrAMINE (BENADRYL) 25 MG Tab 4/5/2024 Active   DIPHENHYDRAMINE HCL, TOPICAL, 2 % Gel 2 WEEKS Active   Fluticasone Propionate, Inhal, 50 MCG/ACT AEROSOL POWDER, BREATH ACTIVATED 4/5/2024 Active   furosemide (LASIX) 40 MG Tab 4/5/2024 Active   levothyroxine (SYNTHROID) 50 MCG Tab 4/6/2024 Active   Lidocaine 4 % Aerosol 4/6/2024 Active  "  lisinopril (PRINIVIL) 5 MG Tab 4/6/2024 Active   Lutein 20 MG Cap 4/5/2024 Active   magnesium oxide (MAG-OX) 400 MG Tab tablet 4/6/2024 Active   Mirabegron ER (MYRBETRIQ) 50 MG TABLET SR 24 HR 4/6/2024 Active   Neomycin-Bacitracin-Polymyxin (HCA TRIPLE ANTIBIOTIC OINTMENT EX) 4/6/2024 Active   PREBIOTIC PRODUCT PO 4/6/2024 Active   rivaroxaban (XARELTO) 20 MG Tab tablet 4/6/2024 Active   Sennosides (SENOKOT PO) 4/5/2024 Active   sertraline (ZOLOFT) 50 MG Tab 4/6/2024 Active   trolamine salicylate (ASPERCREME OR MYOFLEX) 10 % cream 4/6/2024 Active                    ALLERGIES  Allergies   Allergen Reactions    Amiodarone Hives     Throat and tongue itching    Bactrim Shortness of Breath    Cipro Xr Swelling    Metoprolol Swelling     Causes throat swelling    Morphine Unspecified     Hallucinations    Phytoplex Z-Guard [Petrolatum-Zinc Oxide] Unspecified     \"causes burning\"    Pseudoephedrine Palpitations    Qvar [Beclomethasone Dipropionate] Unspecified     Pressure on heart      Vibramycin Shortness of Breath    Atorvastatin Calcium-Polysorbate 80 Unspecified     Muscle aches      Augmentin Unspecified     Unknown reaction    Diltiazem Rash     rash    Flecainide Unspecified     dizziness    Keflex Unspecified     Pt states \"Unsure\".    Mucinex Unspecified     GI Distress      Tramadol Unspecified     crying    Atorvastatin Myalgia    Tape Rash     Paper tape okay       PHYSICAL EXAM  VITAL SIGNS: /58   Pulse 80   Temp 36.2 °C (97.2 °F) (Temporal)   Resp (!) 28   Ht 1.626 m (5' 4\")   Wt 82.6 kg (182 lb)   LMP 01/10/1975   SpO2 96%   BMI 31.24 kg/m²      Nursing notes and vitals reviewed.  Constitutional: Well developed, Well nourished, No acute distress, Non-toxic appearance.   Eyes: PERRLA, EOMI, Conjunctiva normal, No discharge.   HENT: Normocephalic, Atraumatic, moist mucous membranes, no facial edema   Cardiovascular: Normal heart rate, Normal rhythm, No murmurs, No rubs, No gallops.   Thorax & " Lungs: Slight respiratory distress, rales and rhonchi bilaterally, no wheezing, hypoxic 88% on room air  Abdomen: Bowel sounds normal, Soft, No tenderness, No guarding, No rebound, No masses, No pulsatile masses.   Skin: Warm, Dry, No erythema, No rash.   Extremities: No deformity, no pedal edema, good range of motion range of motion upper lower extremes bilaterally        EKG/LABS  Results for orders placed or performed during the hospital encounter of 04/07/24   CBC with Differential   Result Value Ref Range    WBC 7.4 4.8 - 10.8 K/uL    RBC 4.20 4.20 - 5.40 M/uL    Hemoglobin 14.3 12.0 - 16.0 g/dL    Hematocrit 41.5 37.0 - 47.0 %    MCV 98.8 (H) 81.4 - 97.8 fL    MCH 34.0 (H) 27.0 - 33.0 pg    MCHC 34.5 32.2 - 35.5 g/dL    RDW 48.8 35.9 - 50.0 fL    Platelet Count 222 164 - 446 K/uL    MPV 9.9 9.0 - 12.9 fL    Neutrophils-Polys 70.40 44.00 - 72.00 %    Lymphocytes 18.40 (L) 22.00 - 41.00 %    Monocytes 8.70 0.00 - 13.40 %    Eosinophils 1.70 0.00 - 6.90 %    Basophils 0.40 0.00 - 1.80 %    Immature Granulocytes 0.40 0.00 - 0.90 %    Nucleated RBC 0.00 0.00 - 0.20 /100 WBC    Neutrophils (Absolute) 5.22 1.82 - 7.42 K/uL    Lymphs (Absolute) 1.37 1.00 - 4.80 K/uL    Monos (Absolute) 0.65 0.00 - 0.85 K/uL    Eos (Absolute) 0.13 0.00 - 0.51 K/uL    Baso (Absolute) 0.03 0.00 - 0.12 K/uL    Immature Granulocytes (abs) 0.03 0.00 - 0.11 K/uL    NRBC (Absolute) 0.00 K/uL   Lactic Acid   Result Value Ref Range    Lactic Acid 1.1 0.5 - 2.0 mmol/L   D-DIMER   Result Value Ref Range    D-Dimer 1.12 (H) 0.00 - 0.50 ug/mL (FEU)   Comp Metabolic Panel   Result Value Ref Range    Sodium 133 (L) 135 - 145 mmol/L    Potassium 4.2 3.6 - 5.5 mmol/L    Chloride 97 96 - 112 mmol/L    Co2 24 20 - 33 mmol/L    Anion Gap 12.0 7.0 - 16.0    Glucose 133 (H) 65 - 99 mg/dL    Bun 19 8 - 22 mg/dL    Creatinine 0.72 0.50 - 1.40 mg/dL    Calcium 9.9 8.5 - 10.5 mg/dL    Correct Calcium 9.4 8.5 - 10.5 mg/dL    AST(SGOT) 26 12 - 45 U/L    ALT(SGPT)  15 2 - 50 U/L    Alkaline Phosphatase 91 30 - 99 U/L    Total Bilirubin 0.6 0.1 - 1.5 mg/dL    Albumin 4.6 3.2 - 4.9 g/dL    Total Protein 8.0 6.0 - 8.2 g/dL    Globulin 3.4 1.9 - 3.5 g/dL    A-G Ratio 1.4 g/dL   MAGNESIUM   Result Value Ref Range    Magnesium 2.1 1.5 - 2.5 mg/dL   PHOSPHORUS   Result Value Ref Range    Phosphorus 2.8 2.5 - 4.5 mg/dL   PROCALCITONIN   Result Value Ref Range    Procalcitonin 0.07 <0.25 ng/mL   CRP QUANTITIVE (NON-CARDIAC)   Result Value Ref Range    Stat C-Reactive Protein 1.72 (H) 0.00 - 0.75 mg/dL   TROPONIN   Result Value Ref Range    Troponin T 18 6 - 19 ng/L   proBrain Natriuretic Peptide, NT   Result Value Ref Range    NT-proBNP 1015 (H) 0 - 125 pg/mL   ESTIMATED GFR   Result Value Ref Range    GFR (CKD-EPI) 82 >60 mL/min/1.73 m 2   EKG   Result Value Ref Range    Report       [facility]    Test Date:  2024  Pt Name:    STEPHANIE DAVENPORT                 Department: ER  MRN:        [id number]                      Room:       [facility]  Gender:     Female                       Technician: [id number]  :        [id number]                   Requested By:ER TRIAGE PROTOCOL  Order #:    [id number]                    Reading MD: MOSHE BLUM DO    Measurements  Intervals                                Axis  Rate:       83                           P:          114  NV:         159                          QRS:        -82  QRSD:       136                          T:          94  QT:         429  QTc:        505    Interpretive Statements  A-V dual-paced complexes w/ some inhibition  No further analysis attempted due to paced rhythm  Compared to ECG 2024 23:16:09  Left bundle-branch block no longer present  Electronically Signed On 2024 10:38:54 PDT by MOSHE BLUM DO       *Note: Due to a large number of results and/or encounters for the requested time period, some results have not been displayed. A complete set of results can be  found in Results Review.     I have independently interpreted this EKG    RADIOLOGY  I have independently interpreted the diagnostic imaging associated with this visit and am waiting the final reading from the radiologist.   My preliminary interpretation is as follows: Chest x-ray is negative for infiltrate    Radiologist interpretation:  CT-CTA CHEST PULMONARY ARTERY W/ RECONS   Final Result         1. No CT evidence of pulmonary embolism.      2. Ill-defined groundglass opacities in the bilateral lungs, right more than left, likely multifocal pneumonia.      3. No pleural effusion. No pneumothorax.      DX-CHEST-PORTABLE (1 VIEW)   Final Result      No acute process.          COURSE & MEDICAL DECISION MAKING    ASSESSMENT, COURSE AND PLAN  Care Narrative: This a pleasant 83-year-old female presents with hypoxia, increased work of breathing shortness of breath.  Initial x-ray is negative, she does have a leukocytosis and negative procalcitonin.  D-dimer was obtained was elevated.  For this reason, CT pulmonary angiogram was completed.  CT mammogram does reveal multifocal pneumonia but no evidence of pulmonary embolism.  She does have an elevated inflammatory marker.  The patient requiring 2 L nasal cannula for oxygen supplementation.  Do believe patient require hospitalization.  She received ceftriaxone IV for pneumonia, I will hospitalize the patient to  for hospitalization.  She also has an elevated BNP thousand may have a mixed component of heart failure and pneumonia may need echocardiogram here in the emergency department.    CRITICAL CARE  The very real possibilty of a deterioration of this patient's condition required the highest level of my preparedness for sudden, emergent intervention.  I provided critical care services, which included medication orders, frequent reevaluations of the patient's condition and response to treatment, ordering and reviewing test results, and discussing the case with  various consultants.  The critical care time associated with the care of the patient was 35 minutes. Review chart for interventions. This time is exclusive of any other billable procedures.         DISPOSITION AND DISCUSSIONS  I have discussed management of the patient with the following physicians and DANNY's:  Dr Posada      FINAL DIAGNOSIS  Pneumonia  Hypoxia  Critical care time 35 minutes      Electronically signed by: Asa Glaser D.O., 4/7/2024 10:36 AM

## 2024-04-07 NOTE — ED NOTES
Med Rec complete per pt's daughter  Allergies Reviewed    Pt reports taking anticoagulant in the last 14 days  Anticoagulant: Xarelto, Last dose: 4/06 PM

## 2024-04-07 NOTE — H&P
Hospital Medicine History & Physical Note    Date of Service  4/7/2024    Primary Care Physician  Jolie Escobar M.D.        Code Status  Full Code    Chief Complaint  Chief Complaint   Patient presents with    Cough     Productive cough started approximately 1 hr ago per patient.     Shortness of Breath       History of Presenting Illness  Donte Magaña is a 83 y.o. female who presented 4/7/2024 with persistent shortness of breath and cough.  Patient has a past medical history of atrial fibrillation on anticoagulation, hypertension, hypothyroidism.  She states that over the last week, she has had a persistent cough.  Patient denies any fevers, chills, nausea, vomiting.  She revisited the emergency department today for worsening shortness of breath.  Viral panel unremarkable.  She currently quires 2 L supplemental oxygen.  CT imaging was obtained showing multifocal pneumonia.  She was empirically started with IV antibiotics in the emergency department.  Patient will be admitted for acute hypoxic respiratory failure    I discussed the plan of care with patient.    Review of Systems  Review of Systems   Constitutional:  Negative for chills and fever.   HENT:  Negative for congestion, ear discharge, ear pain, nosebleeds and sinus pain.    Eyes:  Negative for double vision, photophobia, pain and discharge.   Respiratory:  Positive for cough and shortness of breath. Negative for hemoptysis and sputum production.    Cardiovascular:  Negative for chest pain, palpitations and orthopnea.   Gastrointestinal:  Negative for abdominal pain, diarrhea, nausea and vomiting.   Genitourinary:  Negative for frequency, hematuria and urgency.   Musculoskeletal:  Negative for back pain and neck pain.   Neurological:  Negative for dizziness and headaches.   Psychiatric/Behavioral:  Negative for hallucinations and substance abuse. The patient is not nervous/anxious and does not have insomnia.        Past Medical History   has a past  medical history of A-fib (HCC), Anesthesia, Anticoagulant long-term use (1/12/2012), Arthritis, Atrial fibrillation (HCC), Backpain, Bowel habit changes, Breath shortness, Bronchitis (Nov, 2013), CAD (coronary artery disease), Depression, Glaucoma (5/3/2011), Hematoma complicating a procedure (11/3/2012), Hemorrhagic disorder ([facility]), Hypertension, Hypothyroid, Lupus (HCC), Macular degeneration, Menopause (1/12/2012), Mitral regurgitation (10/30/2012), Obesity (1/12/2012), Pacemaker (2018), Pneumonia (feb,2013), Pre-syncope (6/29/2018), Pulmonary hypertension (HCC) (10/30/2012), PVC's (premature ventricular contractions) (1/12/2012), Senile nuclear sclerosis, Spinal stenosis of lumbar region at multiple levels, Unspecified cataract, Unspecified urinary incontinence, and Urinary bladder disorder.    Surgical History   has a past surgical history that includes tonsillectomy and adenoidectomy; recovery (11/30/2010); colonoscopy (2008); abdominal hysterectomy total (April 15,1975); other; cataract phaco with iol (4/21/2015); cataract phaco with iol (Right, 5/5/2015); pacemaker insertion (06/30/2018); open reduction; other cardiac surgery (12/2017 and 07/19/2018 ); hip arth anterior total (Right, 1/17/2019); irrigation & debridement ortho (Right, 2/14/2019); pr combined ant/post colporrhaphy (1/14/2020); pr lap,diagnostic abdomen (1/14/2020); enterocele repair (1/14/2020); bladder sling female (1/14/2020); vaginal suspension (1/14/2020); salpingo oophorectomy (Bilateral, 1/14/2020); knee arthroplasty total (Left, 5/28/2015); and knee arthroplasty total (Right, 6/23/2016).     Family History  family history includes Cancer in her paternal aunt; Diabetes in her father; GI Disease in her daughter; Heart Disease in her brother and daughter; Other in her daughter; Stroke in her mother, sister, and sister.   Family history reviewed with patient. There is no family history that is pertinent to the chief complaint.     Social  "History   reports that she has never smoked. She has never used smokeless tobacco. She reports that she does not drink alcohol and does not use drugs.    Allergies  Allergies   Allergen Reactions    Amiodarone Hives     Throat and tongue itching    Bactrim Shortness of Breath    Cipro Xr Swelling    Metoprolol Swelling     Causes throat swelling    Morphine Unspecified     Hallucinations    Phytoplex Z-Guard [Petrolatum-Zinc Oxide] Unspecified     \"causes burning\"    Pseudoephedrine Palpitations    Qvar [Beclomethasone Dipropionate] Unspecified     Pressure on heart      Vibramycin Shortness of Breath    Atorvastatin Calcium-Polysorbate 80 Unspecified     Muscle aches      Augmentin Unspecified     Unknown reaction    Diltiazem Rash     rash    Flecainide Unspecified     dizziness    Keflex Unspecified     Pt states \"Unsure\".    Mucinex Unspecified     GI Distress      Tramadol Unspecified     crying    Atorvastatin Myalgia    Tape Rash     Paper tape okay       Medications  Prior to Admission Medications   Prescriptions Last Dose Informant Patient Reported? Taking?   Acetaminophen 500 MG Cap 4/6/2024 at PM Family Member Yes Yes   Sig: Take 2 Caps by mouth 3 times a day as needed. Indications: Pain   Cholecalciferol (VITAMIN D) 2000 UNIT Tab 4/6/2024 at PM Family Member Yes Yes   Sig: Take 2,000 Units by mouth 2 times a day. Indications: supplement   DIPHENHYDRAMINE HCL, TOPICAL, 2 % Gel 2 WEEKS at PRN Family Member Yes No   Sig: Apply 1 Application topically 4 times a day as needed (itching). Indications: itching   Fluticasone Propionate, Inhal, 50 MCG/ACT AEROSOL POWDER, BREATH ACTIVATED 4/5/2024 at PRN Family Member Yes No   Sig: Administer 2 Sprays into affected nostril(S) every day at 6 PM. One spray in each nostril  Indications: allergies   Lidocaine 4 % Aerosol 4/6/2024 at AM Family Member Yes Yes   Sig: Apply 1 Spray topically 3 times a day as needed (Pain). Indications: pain   Lutein 20 MG Cap 4/5/2024 at " AM Family Member No No   Sig: Take 1 Capsule by mouth every day. take 40 mg   Mirabegron ER (MYRBETRIQ) 50 MG TABLET SR 24 HR 4/6/2024 at AM Family Member No Yes   Sig: Take 50 mg by mouth every day. Indications: Urinary Incontinence   Neomycin-Bacitracin-Polymyxin (HCA TRIPLE ANTIBIOTIC OINTMENT EX) 4/6/2024 at AM Family Member Yes Yes   Sig: Apply 1 Application topically 3 times a day as needed (For cuts and burns healing). Indications: scrapes and burns for healing   PREBIOTIC PRODUCT PO 4/6/2024 at AM Family Member Yes Yes   Sig: Take 2 Tablets by mouth every day. Indications: Suppliment   Sennosides (SENOKOT PO) 4/5/2024 at AM Family Member Yes No   Sig: Take 1 Tablet by mouth 1 time a day as needed (constipation). 8.6 mg  Indications: constipation   atenolol (TENORMIN) 50 MG Tab 4/6/2024 at PM Family Member No Yes   Sig: Take 1 Tablet by mouth every evening.   calcium carbonate (TUMS) 500 MG Chew Tab 1 WEEK at PRN Family Member Yes No   Sig: Chew 1,000 mg 3 times a day as needed (heartburn). Indications: Heartburn   diclofenac sodium (VOLTAREN) 1 % Gel 2 WEEKS at PRN Family Member Yes No   Sig: Apply 2 g topically 2 times a day as needed (left hip pain). Indications: pain   diphenhydrAMINE (BENADRYL) 25 MG Tab 4/5/2024 at PM Family Member Yes No   Sig: Take 50 mg by mouth at bedtime as needed for Sleep. Indications: sleep   furosemide (LASIX) 40 MG Tab 4/5/2024 at PRN Family Member Yes Yes   Sig: Take 40 mg by mouth 1 time a day as needed. Indications: Edema   levothyroxine (SYNTHROID) 50 MCG Tab 4/6/2024 at AM Family Member No Yes   Sig: Take 1 Tablet by mouth every morning on an empty stomach. Indications: Underactive Thyroid   lisinopril (PRINIVIL) 5 MG Tab 4/6/2024 at AM Family Member No Yes   Sig: TAKE 1 TABLET BY MOUTH EVERY DAY   magnesium oxide (MAG-OX) 400 MG Tab tablet 4/6/2024 at AM Family Member No Yes   Sig: Take 1 Tablet by mouth every day. Indications: Acid Indigestion   rivaroxaban (XARELTO) 20  MG Tab tablet 4/6/2024 at PM Family Member No Yes   Sig: Take 1 Tablet by mouth with dinner.   sertraline (ZOLOFT) 50 MG Tab 4/6/2024 at AM Family Member No Yes   Sig: Take 1 Tablet by mouth every day. Indications: Major Depressive Disorder   trolamine salicylate (ASPERCREME OR MYOFLEX) 10 % cream 4/6/2024 at PM Family Member Yes Yes   Sig: Apply 1 Application topically 4 times a day as needed (Pain). APPLY LEFT HIP  Indications: pain      Facility-Administered Medications: None       Physical Exam  Temp:  [36.2 °C (97.2 °F)] 36.2 °C (97.2 °F)  Pulse:  [80-84] 80  Resp:  [20-28] 28  BP: (109-166)/(56-80) 134/58  SpO2:  [87 %-97 %] 96 %  Blood Pressure : 134/58   Temperature: 36.2 °C (97.2 °F)   Pulse: 80   Respiration: (!) 28   Pulse Oximetry: 96 %       Physical Exam  Constitutional:       General: She is not in acute distress.     Appearance: Normal appearance. She is normal weight. She is not ill-appearing, toxic-appearing or diaphoretic.   HENT:      Head: Normocephalic and atraumatic.      Nose: Nose normal.      Mouth/Throat:      Mouth: Mucous membranes are moist.   Eyes:      Extraocular Movements: Extraocular movements intact.      Pupils: Pupils are equal, round, and reactive to light.   Cardiovascular:      Rate and Rhythm: Normal rate and regular rhythm.      Pulses: Normal pulses.      Heart sounds: Normal heart sounds. No murmur heard.     No friction rub. No gallop.   Pulmonary:      Effort: Pulmonary effort is normal. No respiratory distress.      Breath sounds: No stridor. No wheezing, rhonchi or rales.   Chest:      Chest wall: No tenderness.   Abdominal:      General: Abdomen is flat. There is no distension.      Palpations: Abdomen is soft. There is no mass.      Tenderness: There is no abdominal tenderness. There is no guarding or rebound.      Hernia: No hernia is present.   Musculoskeletal:         General: No swelling, tenderness, deformity or signs of injury.      Right lower leg: No edema.       Left lower leg: No edema.      Comments:      Skin:     General: Skin is warm and dry.      Capillary Refill: Capillary refill takes less than 2 seconds.      Coloration: Skin is not jaundiced or pale.      Findings: No bruising, erythema, lesion or rash.   Neurological:      General: No focal deficit present.      Mental Status: She is alert and oriented to person, place, and time. Mental status is at baseline.      Cranial Nerves: No cranial nerve deficit.      Sensory: No sensory deficit.      Motor: No weakness.      Coordination: Coordination normal.   Psychiatric:         Mood and Affect: Mood normal.         Behavior: Behavior normal.         Laboratory:  Recent Labs     04/04/24 2317 04/07/24  1028   WBC 10.4 7.4   RBC 4.29 4.20   HEMOGLOBIN 14.2 14.3   HEMATOCRIT 42.9 41.5   .0* 98.8*   MCH 33.1* 34.0*   MCHC 33.1 34.5   RDW 50.9* 48.8   PLATELETCT 246 222   MPV 9.9 9.9     Recent Labs     04/04/24 2317 04/07/24  1118   SODIUM 134* 133*   POTASSIUM 4.9 4.2   CHLORIDE 101 97   CO2 19* 24   GLUCOSE 157* 133*   BUN 27* 19   CREATININE 0.93 0.72   CALCIUM 9.3 9.9     Recent Labs     04/04/24 2317 04/07/24  1118   ALTSGPT 11 15   ASTSGOT 19 26   ALKPHOSPHAT 100* 91   TBILIRUBIN 0.4 0.6   GLUCOSE 157* 133*         Recent Labs     04/04/24 2317 04/07/24  1118   NTPROBNP 1079* 1015*         Recent Labs     04/07/24  1118   TROPONINT 18       Imaging:  CT-CTA CHEST PULMONARY ARTERY W/ RECONS   Final Result         1. No CT evidence of pulmonary embolism.      2. Ill-defined groundglass opacities in the bilateral lungs, right more than left, likely multifocal pneumonia.      3. No pleural effusion. No pneumothorax.      DX-CHEST-PORTABLE (1 VIEW)   Final Result      No acute process.          X-Ray:  I have personally reviewed the images and compared with prior images.  EKG:  I have personally reviewed the images and compared with prior images.    Assessment/Plan:  Justification for Admission  Status  I anticipate this patient will require at least two midnights for appropriate medical management, necessitating inpatient admission because acute hypoxic respiratory failure    Patient will need a Telemetry bed on MEDICAL service .  The need is secondary to acute hypoxic respiratory failure.    * Acute respiratory failure with hypoxia (HCC)- (present on admission)  Assessment & Plan  Patient currently requiring 2 L supplemental oxygen  CT imaging: Ill-defined groundglass opacities in the bilateral lungs, right more than left, likely multifocal pneumonia.   No leukocytosis.  Procalcitonin negative  Follow-up cultures  Continue with IV ceftriaxone and azithromycin    Advanced care planning/counseling discussion- (present on admission)  Assessment & Plan  I spent 17 minutes at bedside in the emergency department with nursing staff present with patient discussing work-up, results, diagnosis, prognosis.  CODE STATUS discussed with patient and wants to be full code       Persistent atrial fibrillation (HCC)- (present on admission)  Assessment & Plan  Patient takes atenolol every evening and Xarelto at home.  Continue with home medications  Followed by cardiology    Hypothyroidism- (present on admission)  Assessment & Plan  Continue synthroid     Pneumonia- (present on admission)  Assessment & Plan  Ill-defined groundglass opacities in the bilateral lungs, right more than left, likely multifocal pneumonia.   Continue with IV ceftriaxone and azithromycin        VTE prophylaxis: therapeutic anticoagulation with xarelto

## 2024-04-07 NOTE — ASSESSMENT & PLAN NOTE
I spent 17 minutes at bedside in the emergency department with nursing staff present with patient discussing work-up, results, diagnosis, prognosis.  CODE STATUS discussed with patient and wants to be full code

## 2024-04-07 NOTE — ASSESSMENT & PLAN NOTE
Patient currently requiring 2 L supplemental oxygen  CT imaging: Ill-defined groundglass opacities in the bilateral lungs, right more than left, likely multifocal pneumonia.   No leukocytosis.  Procalcitonin negative  Follow-up cultures  Continue with IV ceftriaxone and azithromycin    4/10/2024  Likely due to acute HFpEF.  Antibiotics have been discontinued.  On 2 LPM  I have ordered incentive spirometry and PEP therapy  Continuous pulse oximetry monitoring    4/11/2024  Improving to 1 LPM  Hold diuresis  Start azithromycin  Continuous pulse oximetry monitoring

## 2024-04-07 NOTE — ASSESSMENT & PLAN NOTE
Ill-defined groundglass opacities in the bilateral lungs, right more than left, likely multifocal pneumonia.   Continue with IV ceftriaxone and azithromycin    4/10/2024  Pneumonia has been ruled out    4/11/2024  Productive cough despite adequate diuresis  Concerning for atypical pneumonia  Repeat chest x-ray  Start azithromycin  Repeat procalcitonin  PEP therapy  IS

## 2024-04-07 NOTE — ED TRIAGE NOTES
Chief Complaint   Patient presents with    Cough     Productive cough started approximately 1 hr ago per patient.     Shortness of Breath

## 2024-04-08 ENCOUNTER — APPOINTMENT (OUTPATIENT)
Dept: CARDIOLOGY | Facility: MEDICAL CENTER | Age: 84
DRG: 189 | End: 2024-04-08
Attending: STUDENT IN AN ORGANIZED HEALTH CARE EDUCATION/TRAINING PROGRAM
Payer: MEDICARE

## 2024-04-08 ENCOUNTER — TELEPHONE (OUTPATIENT)
Dept: URGENT CARE | Facility: CLINIC | Age: 84
End: 2024-04-08

## 2024-04-08 LAB
ALBUMIN SERPL BCP-MCNC: 4 G/DL (ref 3.2–4.9)
ALBUMIN/GLOB SERPL: 1.3 G/DL
ALP SERPL-CCNC: 78 U/L (ref 30–99)
ALT SERPL-CCNC: 16 U/L (ref 2–50)
ANION GAP SERPL CALC-SCNC: 13 MMOL/L (ref 7–16)
AST SERPL-CCNC: 27 U/L (ref 12–45)
BILIRUB SERPL-MCNC: 0.5 MG/DL (ref 0.1–1.5)
BUN SERPL-MCNC: 20 MG/DL (ref 8–22)
CALCIUM ALBUM COR SERPL-MCNC: 9.4 MG/DL (ref 8.5–10.5)
CALCIUM SERPL-MCNC: 9.4 MG/DL (ref 8.5–10.5)
CHLORIDE SERPL-SCNC: 99 MMOL/L (ref 96–112)
CO2 SERPL-SCNC: 23 MMOL/L (ref 20–33)
CREAT SERPL-MCNC: 0.72 MG/DL (ref 0.5–1.4)
ERYTHROCYTE [DISTWIDTH] IN BLOOD BY AUTOMATED COUNT: 49.1 FL (ref 35.9–50)
GFR SERPLBLD CREATININE-BSD FMLA CKD-EPI: 82 ML/MIN/1.73 M 2
GLOBULIN SER CALC-MCNC: 3.1 G/DL (ref 1.9–3.5)
GLUCOSE SERPL-MCNC: 118 MG/DL (ref 65–99)
HCT VFR BLD AUTO: 44.2 % (ref 37–47)
HGB BLD-MCNC: 14.8 G/DL (ref 12–16)
LV EJECT FRACT  99904: 60
LV EJECT FRACT MOD 2C 99903: 62.08
LV EJECT FRACT MOD 4C 99902: 58.42
LV EJECT FRACT MOD BP 99901: 60.53
MCH RBC QN AUTO: 32.9 PG (ref 27–33)
MCHC RBC AUTO-ENTMCNC: 33.5 G/DL (ref 32.2–35.5)
MCV RBC AUTO: 98.2 FL (ref 81.4–97.8)
PLATELET # BLD AUTO: 256 K/UL (ref 164–446)
PMV BLD AUTO: 10 FL (ref 9–12.9)
POTASSIUM SERPL-SCNC: 4.2 MMOL/L (ref 3.6–5.5)
PROT SERPL-MCNC: 7.1 G/DL (ref 6–8.2)
RBC # BLD AUTO: 4.5 M/UL (ref 4.2–5.4)
SODIUM SERPL-SCNC: 135 MMOL/L (ref 135–145)
WBC # BLD AUTO: 9.3 K/UL (ref 4.8–10.8)

## 2024-04-08 PROCEDURE — 97166 OT EVAL MOD COMPLEX 45 MIN: CPT

## 2024-04-08 PROCEDURE — A9270 NON-COVERED ITEM OR SERVICE: HCPCS | Performed by: STUDENT IN AN ORGANIZED HEALTH CARE EDUCATION/TRAINING PROGRAM

## 2024-04-08 PROCEDURE — A9270 NON-COVERED ITEM OR SERVICE: HCPCS

## 2024-04-08 PROCEDURE — 85027 COMPLETE CBC AUTOMATED: CPT

## 2024-04-08 PROCEDURE — 93306 TTE W/DOPPLER COMPLETE: CPT

## 2024-04-08 PROCEDURE — 700102 HCHG RX REV CODE 250 W/ 637 OVERRIDE(OP)

## 2024-04-08 PROCEDURE — 770020 HCHG ROOM/CARE - TELE (206)

## 2024-04-08 PROCEDURE — 700101 HCHG RX REV CODE 250: Performed by: STUDENT IN AN ORGANIZED HEALTH CARE EDUCATION/TRAINING PROGRAM

## 2024-04-08 PROCEDURE — 80053 COMPREHEN METABOLIC PANEL: CPT

## 2024-04-08 PROCEDURE — 700102 HCHG RX REV CODE 250 W/ 637 OVERRIDE(OP): Performed by: STUDENT IN AN ORGANIZED HEALTH CARE EDUCATION/TRAINING PROGRAM

## 2024-04-08 PROCEDURE — 700111 HCHG RX REV CODE 636 W/ 250 OVERRIDE (IP): Performed by: STUDENT IN AN ORGANIZED HEALTH CARE EDUCATION/TRAINING PROGRAM

## 2024-04-08 PROCEDURE — 36415 COLL VENOUS BLD VENIPUNCTURE: CPT

## 2024-04-08 PROCEDURE — 93306 TTE W/DOPPLER COMPLETE: CPT | Mod: 26 | Performed by: INTERNAL MEDICINE

## 2024-04-08 PROCEDURE — 99233 SBSQ HOSP IP/OBS HIGH 50: CPT | Performed by: STUDENT IN AN ORGANIZED HEALTH CARE EDUCATION/TRAINING PROGRAM

## 2024-04-08 PROCEDURE — 97162 PT EVAL MOD COMPLEX 30 MIN: CPT

## 2024-04-08 RX ORDER — AZITHROMYCIN 250 MG/1
500 TABLET, FILM COATED ORAL DAILY
Status: COMPLETED | OUTPATIENT
Start: 2024-04-08 | End: 2024-04-09

## 2024-04-08 RX ADMIN — CEFTRIAXONE SODIUM 2000 MG: 10 INJECTION, POWDER, FOR SOLUTION INTRAVENOUS at 05:23

## 2024-04-08 RX ADMIN — VIBEGRON 75 MG: 75 TABLET, FILM COATED ORAL at 05:22

## 2024-04-08 RX ADMIN — AZITHROMYCIN DIHYDRATE 500 MG: 250 TABLET, FILM COATED ORAL at 12:22

## 2024-04-08 RX ADMIN — BENZONATATE 100 MG: 100 CAPSULE ORAL at 23:58

## 2024-04-08 RX ADMIN — SERTRALINE HYDROCHLORIDE 50 MG: 50 TABLET ORAL at 05:28

## 2024-04-08 RX ADMIN — BENZONATATE 100 MG: 100 CAPSULE ORAL at 06:15

## 2024-04-08 RX ADMIN — RIVAROXABAN 20 MG: 20 TABLET, FILM COATED ORAL at 17:29

## 2024-04-08 RX ADMIN — BENZONATATE 100 MG: 100 CAPSULE ORAL at 06:19

## 2024-04-08 RX ADMIN — ATENOLOL 50 MG: 50 TABLET ORAL at 17:29

## 2024-04-08 RX ADMIN — LISINOPRIL 5 MG: 5 TABLET ORAL at 05:25

## 2024-04-08 RX ADMIN — LEVOTHYROXINE SODIUM 50 MCG: 50 TABLET ORAL at 05:23

## 2024-04-08 ASSESSMENT — COGNITIVE AND FUNCTIONAL STATUS - GENERAL
DAILY ACTIVITIY SCORE: 21
MOBILITY SCORE: 18
HELP NEEDED FOR BATHING: A LITTLE
MOVING FROM LYING ON BACK TO SITTING ON SIDE OF FLAT BED: A LITTLE
CLIMB 3 TO 5 STEPS WITH RAILING: A LITTLE
DRESSING REGULAR LOWER BODY CLOTHING: A LITTLE
STANDING UP FROM CHAIR USING ARMS: A LITTLE
TURNING FROM BACK TO SIDE WHILE IN FLAT BAD: A LITTLE
SUGGESTED CMS G CODE MODIFIER DAILY ACTIVITY: CJ
TOILETING: A LITTLE
MOVING TO AND FROM BED TO CHAIR: A LITTLE
SUGGESTED CMS G CODE MODIFIER MOBILITY: CK
WALKING IN HOSPITAL ROOM: A LITTLE

## 2024-04-08 ASSESSMENT — ACTIVITIES OF DAILY LIVING (ADL): TOILETING: INDEPENDENT

## 2024-04-08 ASSESSMENT — GAIT ASSESSMENTS
GAIT LEVEL OF ASSIST: CONTACT GUARD ASSIST
DEVIATION: INCREASED BASE OF SUPPORT;BRADYKINETIC;DECREASED HEEL STRIKE;DECREASED TOE OFF
DISTANCE (FEET): 15
ASSISTIVE DEVICE: FRONT WHEEL WALKER

## 2024-04-08 ASSESSMENT — FIBROSIS 4 INDEX: FIB4 SCORE: 2.24

## 2024-04-08 ASSESSMENT — PAIN DESCRIPTION - PAIN TYPE: TYPE: ACUTE PAIN

## 2024-04-08 NOTE — DISCHARGE PLANNING
Case Management Discharge Planning    Admission Date: 4/7/2024  GMLOS: 3.6  ALOS: 1    6-Clicks ADL Score: 21  6-Clicks Mobility Score: 18      Anticipated Discharge Dispo: Discharge Disposition: Discharged to home/self care (01)    DME Needed: No    Action(s) Taken: Patient discussed in rounds, not medically cleared today. PT recommending HH services when MC. Pt lives [address] at [facility] facility.     Home Health choice obtained by [facility]. Choice is for [facility]. RN ANTOINETTE sent a message via [place] to Dr. Morgan requesting Home health order, awaiting order.    Escalations Completed: None    Medically Clear: No    Next Steps: F/U with MD for HH order. CM will follow and assist with HH referral and other DCP as they arise.    Barriers to Discharge: Medical clearance and Outpatient referrals pending    Is the patient up for discharge tomorrow: No

## 2024-04-08 NOTE — DISCHARGE PLANNING
HTH/SCP TCN chart review completed. Collaborated with ANTOINETTE Oconnor prior to meeting with the pt. The most current review of medical record, knowledge of pt's PLOF and social support, LACE+ score of 76, 6 clicks scores of 12 ADL's and 18 mobility were considered.   Patient recently discharged from [facility] on 4/4/24.      Pt seen at bedside. Introduced TCN program. Provided education regarding post acute levels of care. Education provided regarding case management policy for blanket SNF referrals. Discussed HTH/SCP plan benefits. Pt verbalizes understanding.     Patient states she lives in a Senior Apartment at [facility] and was independent with ADL's and used her scooter/SPC to dining room and used her 4WW for mobility inside her apartment.  She also has a FWW that she does not use.  Facility does all laundry and her daughter does her medication management although facility will start taking over her medication management as daughter has recently been diagnosed with heart failure.  Patient is on RA at her baseline and is currently on 2 L/min O2.  Patient states she is not at her baseline level of function and is agreeable to HH services.      Choice proactively obtained for HH ([facility]) & DME ([facility]), faxed to [facility] and given to ANTOINETTE. TCN will continue to follow and collaborate with discharge planning team as additional post acute needs arise. Thank you.     Completed today:  PT recommends HH on 4/8/24.    Choice obtained: HH ([facility]) & DME ([facility])  SCP with [facility] PCP.  Referred to schedulers for Follow up appointment.  F/u scheduled for 04.15.24 at 1:40 pm Dr Laird.    Addendum:  1344-  Per chart review, OT recommends HH on 4/8/24.

## 2024-04-08 NOTE — CARE PLAN
The patient is Watcher - Medium risk of patient condition declining or worsening    Shift Goals  Clinical Goals: monitor for labs, abx therapy  Patient Goals: get better    Progress made toward(s) clinical / shift goals:    Problem: Knowledge Deficit - Standard  Goal: Patient and family/care givers will demonstrate understanding of plan of care, disease process/condition, diagnostic tests and medications  Outcome: Progressing     Problem: Skin Integrity  Goal: Skin integrity is maintained or improved  Outcome: Progressing     Problem: Fall Risk  Goal: Patient will remain free from falls  Outcome: Progressing       Patient is not progressing towards the following goals:

## 2024-04-08 NOTE — THERAPY
Occupational Therapy   Initial Evaluation     Patient Name: Donte Magaña  Age:  83 y.o., Sex:  female  Medical Record #: [id number]  Today's Date: 4/8/2024     Precautions  Precautions: (P) Fall Risk    Assessment    Patient is 83 y.o. female admitted for SOB, cough, acute hypoxic respiratory failure, PNA. Pt states normally independent with functional mobility and ADLs living in an ILF apartment receives assistance with IADLs/meals. Pt required CGA for functional mobility and ADLs likely near functional baseline, will continue to see for skilled therapy while admitted as well as recommend home health at discharge.      Plan    Occupational Therapy Initial Treatment Plan   Treatment Interventions: (P) Self Care / Activities of Daily Living, Adaptive Equipment, Manual Therapy Techniques, Neuro Re-Education / Balance, Therapeutic Exercises, Therapeutic Activity  Treatment Frequency: (P) 3 Times per Week  Duration: (P) Until Therapy Goals Met    DC Equipment Recommendations: (P) None (has all equipment)  Discharge Recommendations: (P) Recommend home health for continued occupational therapy services     Objective       04/08/24 0950   Prior Living Situation   Prior Services Intermittent Physical Support for ADL Per Service   Housing / Facility Assisted Living Residence;Independent Living Facility   Bathroom Set up Walk In Shower;Shower Chair;Grab Bars   Equipment Owned Front-Wheel Walker;Single Point Cane;Tub / Shower Seat;Grab Bar(s) In Tub / Shower;Grab Bar(s) By Toilet   Lives with - Patient's Self Care Capacity Alone and Able to Care For Self   Prior Level of ADL Function   Self Feeding Independent   Grooming / Hygiene Independent   Bathing Independent   Dressing Independent   Toileting Independent   Prior Level of IADL Function   Medication Management Requires Assist   Laundry Requires Assist   Kitchen Mobility Requires Assist   Finances Requires Assist   Home Management Requires Assist   Shopping Requires Assist    Prior Level Of Mobility Independent With Device in Home   Driving / Transportation Relatives / Others Provide Transportation   Occupation (Pre-Hospital Vocational) Retired Due To Age   Precautions   Precautions Fall Risk   Cognition    Cognition / Consciousness X   Speech/ Communication Delayed Responses   Level of Consciousness Alert   Comments Pleasant, cooperative, forgetful with FPC name   Active ROM Upper Body   Active ROM Upper Body  WDL   Dominant Hand Right   Strength Upper Body   Upper Body Strength  WDL   Sensation Upper Body   Upper Extremity Sensation  WDL   Upper Body Muscle Tone   Upper Body Muscle Tone  WDL   Neurological Concerns   Neurological Concerns No   Coordination Upper Body   Coordination WDL   Balance Assessment   Sitting Balance (Static) Fair   Sitting Balance (Dynamic) Fair   Standing Balance (Static) Fair -   Standing Balance (Dynamic) Fair -   Weight Shift Sitting Fair   Weight Shift Standing Fair   Comments w/ FWW   Bed Mobility    Supine to Sit Supervised   Scooting Supervised   Rolling Supervised   ADL Assessment   Grooming Contact Guard Assist;Standing   Upper Body Dressing Supervision   Lower Body Dressing Contact Guard Assist   Toileting Supervision   How much help from another person does the patient currently need...   Putting on and taking off regular lower body clothing? 3   Bathing (including washing, rinsing, and drying)? 3   Toileting, which includes using a toilet, bedpan, or urinal? 3   Putting on and taking off regular upper body clothing? 4   Taking care of personal grooming such as brushing teeth? 4   Eating meals? 4   6 Clicks Daily Activity Score 21   Functional Mobility   Sit to Stand Standby Assist   Bed, Chair, Wheelchair Transfer Standby Assist   Toilet Transfers Contact Guard Assist   Mobility bed mobility, bathroom, up to chair   Comments w/ FWW   Activity Tolerance   Sitting in Chair left seated in chair   Sitting Edge of Bed 4 min   Standing 10 min   Short  Term Goals   Short Term Goal # 1 Pt will complete ADL transfers with supervision   Short Term Goal # 2 Pt will complete LB dressing with supervision   Short Term Goal # 3 Pt will complete standing G/H with supervision   Education Group   Education Provided Role of Occupational Therapist   Role of Occupational Therapist Patient Response Patient;Acceptance;Explanation   Occupational Therapy Initial Treatment Plan    Treatment Interventions Self Care / Activities of Daily Living;Adaptive Equipment;Manual Therapy Techniques;Neuro Re-Education / Balance;Therapeutic Exercises;Therapeutic Activity   Treatment Frequency 3 Times per Week   Duration Until Therapy Goals Met   Problem List   Problem List Decreased Active Daily Living Skills;Decreased Homemaking Skills;Decreased Activity Tolerance;Decreased Functional Mobility;Impaired Postural Control / Balance   Anticipated Discharge Equipment and Recommendations   DC Equipment Recommendations None  (has all equipment)   Discharge Recommendations Recommend home health for continued occupational therapy services   Interdisciplinary Plan of Care Collaboration   IDT Collaboration with  Nursing;Physical Therapist   Patient Position at End of Therapy Seated;Chair Alarm On;Call Light within Reach;Tray Table within Reach;Phone within Reach   Collaboration Comments RN updated

## 2024-04-08 NOTE — TELEPHONE ENCOUNTER
Called patient to schedule an appointment for her paperwork for [facility], spoke with [place] Daughter Nikolas she stated that Old has been hospitalized since 4/7/2024. They will schedule an appointment once she is stable and out of the hospital.

## 2024-04-08 NOTE — CARE PLAN
Problem: Knowledge Deficit - Standard  Goal: Patient and family/care givers will demonstrate understanding of plan of care, disease process/condition, diagnostic tests and medications  Outcome: Progressing     Problem: Skin Integrity  Goal: Skin integrity is maintained or improved  Outcome: Progressing     Problem: Fall Risk  Goal: Patient will remain free from falls  Outcome: Progressing   The patient is Stable - Low risk of patient condition declining or worsening    Shift Goals  Clinical Goals: Remain free of falls, no skin breakdown  Patient Goals: Rest  Family Goals: KESHA    Progress made toward(s) clinical / shift goals:     Pt educated on plan of care, pt verbalizes understanding, reinforcement needed. Pt educated on pain/anxiety scales, alleviating factors, pain/anxiety medications, and side effects, and need for pain/anxiety reassessment, pt pain/anxiety controlled at this time, reinforcement needed. Pt educated on the need to reposition frequently and remain dry to avoid skin breakdown, reinforcement needed, no further skin breakdown during shift. Fall risk education provided to pt, pt educated about the need to call for assistance while attempting to ambulate, reinforcement needed, pt remains free of falls this shift.

## 2024-04-08 NOTE — PROGRESS NOTES
Monitor Summary:  Rhythm: 100% AV-paced Rate: 80-90  Ectopies: rare PVC  Measurement: .17/.11/.36

## 2024-04-08 NOTE — PROGRESS NOTES
4 Eyes Skin Assessment Completed by DANNY Munoz and DANNY Jimenez.    Head WDL  Ears Redness and Blanching, left ear slow to florentin    Nose Redness and Blanching, excoriation inside nares  Mouth Redness  Neck Redness and Blanching  Breast/Chest Redness and Blanching  Shoulder Blades Redness and Blanching  Spine Redness and Blanching  (R) Arm/Elbow/Hand Redness, Blanching, and Bruising    (L) Arm/Elbow/Hand Redness and Blanching  Abdomen WDL  Groin Redness, Blanching, Excoriation, and Rash          Scrotum/Coccyx/Buttocks Redness, Blanching, Non-Blanching, Excoriation, and Discoloration    (R) Leg Redness, Blanching, Swelling, Shiny, and Edema  (L) Leg Redness, Blanching, Swelling, Shiny, and Edema  (R) Heel/Foot/Toe Redness, Blanching, and Boggy  (L) Heel/Foot/Toe Redness, Blanching, and Boggy          Devices In Places Tele Box, Blood Pressure Cuff, Pulse Ox, and Oxy Mask      Interventions In Place Gray Ear Foams, Heel Mepilex, TAP System, Pillows, Low Air Loss Mattress, and Heels Loaded W/Pillows (SHUN ordered, no pump available)    Possible Skin Injury Yes    Pictures Uploaded Into Epic Yes  Wound Consult Placed Yes  RN Wound Prevention Protocol Ordered Yes

## 2024-04-08 NOTE — PROGRESS NOTES
Report received from night RN at 0700. PT seen at bedside and pt care assumed. Pt is A&Ox4, on 2L oxymask, denies pain. PIV flushed and intact. PT is 100% paced on telemetry monitor. PT is x1 assist.     Plan of care reviewed with pt's family, call light, phone, and personal belongings within reach. Bed alarm on, and bed in low locked position. All pt's needs met at this time.    Bedside report received from off going RN/tech: DANNY Munoz, assumed care of patient.     Fall Risk Score: HIGH RISK  Fall risk interventions in place: Place yellow fall risk ID band on patient, Provide patient/family education based on risk assessment, Educate patient/family to call staff for assistance when getting out of bed, Place fall precaution signage outside patient door, Place patient in room close to nursing station, Utilize bed/chair fall alarm, Notify charge of high risk for huddle, and Bed alarm connected correctly  Bed type: Regular (Ashutosh Score less than 17 interventions in place)  Patient on cardiac monitor: Yes  IVF/IV medications: Not Applicable   Oxygen: How many liters 2L  Bedside sitter: Not Applicable   Isolation: Not applicable

## 2024-04-08 NOTE — THERAPY
"Physical Therapy   Initial Evaluation     Patient Name: Donte Magaña  Age:  83 y.o., Sex:  female  Medical Record #: [id number]  Today's Date: 4/8/2024     Precautions  Precautions: Fall Risk    Assessment  Patient is 83 y.o. female admitted with complaints of SOB and a cough, pt found to have pneumonia and acute respiratory failure with hypoxia.  PMH includes atrial fibrillation on anticoagulation, hypertension, hypothyroidism, lupus, macular degeneration, PVD, pacemaker, spinal stenosis.      Patient received in bed and agreeable to PT session. Pt was able to mobilize with SBA-CGA and FWW. Pt is primarily limited by decreased functional strength and activity tolerance. Anticipate  within a few more sessions pt will be able to return home with HHTP. Will follow for acute care PT needs.     Plan    Physical Therapy Initial Treatment Plan   Treatment Plan : (P) Bed Mobility, Gait Training, Neuro Re-Education / Balance, Self Care / Home Evaluation, Therapeutic Activities, Therapeutic Exercise  Treatment Frequency: (P) 4 Times per Week  Duration: (P) Until Therapy Goals Met    DC Equipment Recommendations: (P) None  Discharge Recommendations: (P) Recommend home health for continued physical therapy services       Subjective    \"I normally do not need oxygen\".      Objective       04/08/24 0946   Initial Contact Note    Initial Contact Note Order Received and Verified, Physical Therapy Evaluation in Progress with Full Report to Follow.   Precautions   Precautions Fall Risk   Vitals   Pulse 88   Pulse Oximetry 96 %   O2 (LPM) 2   O2 Delivery Device Oxymask   Vitals Comments pt placed on nasal cannula at end of session to eat, RN aware   Pain 0 - 10 Group   Therapist Pain Assessment Post Activity Pain Same as Prior to Activity;Nurse Notified  (pain not rated)   Prior Living Situation   Housing / Facility Assisted Living Residence   Equipment Owned Front-Wheel Walker;Single Point Cane   Lives with - Patient's Self Care " Capacity Alone and Able to Care For Self   Comments Pt reports she ambulates to the dining halls for meals, otherwise is independent   Prior Level of Functional Mobility   Bed Mobility Independent   Transfer Status Independent   Ambulation Independent   Ambulation Distance limited community   Assistive Devices Used Front-Wheel Walker   Cognition    Cognition / Consciousness X   Speech/ Communication Delayed Responses   Level of Consciousness Alert   Comments unable to recall name of [place]   Active ROM Lower Body    Active ROM Lower Body  WDL   Strength Lower Body   Lower Body Strength  X   Comments generalized weakness, equal bilaterally, functional for short distance ambulation   Lower Body Muscle Tone   Lower Body Muscle Tone  WDL   Balance Assessment   Sitting Balance (Static) Fair   Sitting Balance (Dynamic) Fair   Standing Balance (Static) Fair -   Standing Balance (Dynamic) Fair -   Weight Shift Sitting Fair   Weight Shift Standing Fair   Comments w/FWW   Bed Mobility    Supine to Sit Standby Assist   Scooting Standby Assist   Rolling Standby Assist   Comments HOB slightly elevated   Gait Analysis   Gait Level Of Assist Contact Guard Assist   Assistive Device Front Wheel Walker   Distance (Feet) 15   # of Times Distance was Traveled 2   Deviation Increased Base Of Support;Bradykinetic;Decreased Heel Strike;Decreased Toe Off   Weight Bearing Status no restrictions   Functional Mobility   Sit to Stand Standby Assist   Bed, Chair, Wheelchair Transfer Standby Assist   Toilet Transfers Contact Guard Assist   Mobility bed > bathroom > chair   6 Clicks Assessment - How much HELP from from another person do you currently need... (If the patient hasn't done an activity recently, how much help from another person do you think he/she would need if he/she tried?)   Turning from your back to your side while in a flat bed without using bedrails? 3   Moving from lying on your back to sitting on the side of a flat bed without  using bedrails? 3   Moving to and from a bed to a chair (including a wheelchair)? 3   Standing up from a chair using your arms (e.g., wheelchair, or bedside chair)? 3   Walking in hospital room? 3   Climbing 3-5 steps with a railing? 3   6 clicks Mobility Score 18   Short Term Goals    Short Term Goal # 1 Pt will be able to perform STS with FWW and SPV in 6 visits to progress functional transfers   Short Term Goal # 2 Pt will be able to ambulate 150ft with FWW and SPV in 6 visits to progress functional gait   Education Group   Education Provided Role of Physical Therapist   Role of Physical Therapist Patient Response Patient;Acceptance;Explanation;Verbal Demonstration   Physical Therapy Initial Treatment Plan    Treatment Plan  Bed Mobility;Gait Training;Neuro Re-Education / Balance;Self Care / Home Evaluation;Therapeutic Activities;Therapeutic Exercise   Treatment Frequency 4 Times per Week   Duration Until Therapy Goals Met   Problem List    Problems Impaired Transfers;Impaired Ambulation;Functional Strength Deficit;Decreased Activity Tolerance   Anticipated Discharge Equipment and Recommendations   DC Equipment Recommendations None   Discharge Recommendations Recommend home health for continued physical therapy services   Interdisciplinary Plan of Care Collaboration   IDT Collaboration with  Nursing;Occupational Therapist  (collaborated with OT due to pt's limited activity tolerance. Each discipline focused on individual goals and POC.)   Patient Position at End of Therapy Seated;Chair Alarm On;Call Light within Reach;Tray Table within Reach;Phone within Reach   Collaboration Comments RN updated   Session Information   Date / Session Number  4/8 - 1 (1/4, 4/14)

## 2024-04-08 NOTE — ED NOTES
Bedside report received from off going RN/tech: ASSIGNED RN, assumed care of patient.  POC discussed with patient. Call light within reach, all needs addressed at this time.       Fall risk interventions in place: Move the patient closer to the nurse's station, Patient's personal possessions are with in their safe reach, Place socks on patient, Place fall risk sign on patient's door, Give patient urinal if applicable, Keep floor surfaces clean and dry, and Accompanied to restroom (all applicable per [place] Fall risk assessment)   Continuous monitoring: Cardiac Leads, Pulse Ox, or Blood Pressure  IVF/IV medications: Infusion per MAR (List Med(s)) AZITHROMYCIN  Oxygen: How many liters 2L  Bedside sitter: Not Applicable   Isolation: Not Applicable

## 2024-04-08 NOTE — DIETARY
"Nutrition services: Day 1 of admit.  Donte Magaña is an 83 y.o. female with admitting DX of acute respiratory failure with hypoxia.    Consult received for wt loss (unsure), poor PO and wound per admit screen. Met with pt at bedside. Pt appeared adequately nourished, she reported a poor appetite for a \"few days\" prior to admit. She stated her appetite has returned. Pt unsure of wt loss, shared that her UBW is ~200 lbs. Pt does not consume oral nutrition supplements, politely declined any modifications to meal trays.    Assessment:  Height: 162.6 cm (5' 4\")  Weight: 77.5 kg (170 lb 13.7 oz)  Body mass index is 29.33 kg/m²., BMI classification: overweight  Diet/Intake: cardiac; no PO recorded in chart to assess    Evaluation:   Admitted with cough and shortness of breath.  PMH: A-fib, bowel habit changes, coronary artery disease, hypertension, lupus, pulmonary hypertension.  Wt hx per chart review: 185 lbs 10/30/23, 172 lbs 1/9/23, 200 lbs 12/28/22. Wt variable, wt loss of 8% in five months is not significant.  Skin breakdown noted per RN skin check. Consult pending to wound team.    Malnutrition Risk: Does not meet criteria per ASPEN guidelines at this time.    Recommendations/Plan:  Encourage intake of meals.  Document intake of all meals as % taken in ADLs to provide interdisciplinary communication across all shifts.   Monitor weight.  Nutrition rep will continue to see patient for ongoing meal and snack preferences.     RD will follow per dept guidelines.    "

## 2024-04-09 ENCOUNTER — HOME CARE VISIT (OUTPATIENT)
Dept: HOME HEALTH SERVICES | Facility: HOME HEALTHCARE | Age: 84
End: 2024-04-09
Payer: MEDICARE

## 2024-04-09 PROCEDURE — 700102 HCHG RX REV CODE 250 W/ 637 OVERRIDE(OP): Performed by: STUDENT IN AN ORGANIZED HEALTH CARE EDUCATION/TRAINING PROGRAM

## 2024-04-09 PROCEDURE — A9270 NON-COVERED ITEM OR SERVICE: HCPCS | Performed by: STUDENT IN AN ORGANIZED HEALTH CARE EDUCATION/TRAINING PROGRAM

## 2024-04-09 PROCEDURE — 700111 HCHG RX REV CODE 636 W/ 250 OVERRIDE (IP): Mod: JZ | Performed by: STUDENT IN AN ORGANIZED HEALTH CARE EDUCATION/TRAINING PROGRAM

## 2024-04-09 PROCEDURE — 770020 HCHG ROOM/CARE - TELE (206)

## 2024-04-09 PROCEDURE — 700101 HCHG RX REV CODE 250: Performed by: STUDENT IN AN ORGANIZED HEALTH CARE EDUCATION/TRAINING PROGRAM

## 2024-04-09 PROCEDURE — 99232 SBSQ HOSP IP/OBS MODERATE 35: CPT | Performed by: STUDENT IN AN ORGANIZED HEALTH CARE EDUCATION/TRAINING PROGRAM

## 2024-04-09 PROCEDURE — 700111 HCHG RX REV CODE 636 W/ 250 OVERRIDE (IP): Performed by: STUDENT IN AN ORGANIZED HEALTH CARE EDUCATION/TRAINING PROGRAM

## 2024-04-09 PROCEDURE — A9270 NON-COVERED ITEM OR SERVICE: HCPCS

## 2024-04-09 PROCEDURE — 51798 US URINE CAPACITY MEASURE: CPT

## 2024-04-09 PROCEDURE — 700102 HCHG RX REV CODE 250 W/ 637 OVERRIDE(OP)

## 2024-04-09 RX ORDER — FUROSEMIDE 10 MG/ML
40 INJECTION INTRAMUSCULAR; INTRAVENOUS ONCE
Status: COMPLETED | OUTPATIENT
Start: 2024-04-09 | End: 2024-04-09

## 2024-04-09 RX ORDER — NYSTATIN 100000 [USP'U]/G
POWDER TOPICAL 2 TIMES DAILY
Status: DISCONTINUED | OUTPATIENT
Start: 2024-04-09 | End: 2024-04-12 | Stop reason: HOSPADM

## 2024-04-09 RX ORDER — DEXTROMETHORPHAN POLISTIREX 30 MG/5ML
60 SUSPENSION ORAL 2 TIMES DAILY
Status: DISCONTINUED | OUTPATIENT
Start: 2024-04-09 | End: 2024-04-12 | Stop reason: HOSPADM

## 2024-04-09 RX ORDER — ALPRAZOLAM 0.25 MG/1
0.25 TABLET ORAL ONCE
Status: COMPLETED | OUTPATIENT
Start: 2024-04-09 | End: 2024-04-09

## 2024-04-09 RX ORDER — ONDANSETRON 2 MG/ML
4 INJECTION INTRAMUSCULAR; INTRAVENOUS EVERY 6 HOURS PRN
Status: DISCONTINUED | OUTPATIENT
Start: 2024-04-09 | End: 2024-04-12 | Stop reason: HOSPADM

## 2024-04-09 RX ADMIN — AZITHROMYCIN DIHYDRATE 500 MG: 250 TABLET, FILM COATED ORAL at 05:03

## 2024-04-09 RX ADMIN — LISINOPRIL 5 MG: 5 TABLET ORAL at 05:02

## 2024-04-09 RX ADMIN — ATENOLOL 50 MG: 50 TABLET ORAL at 17:17

## 2024-04-09 RX ADMIN — VIBEGRON 75 MG: 75 TABLET, FILM COATED ORAL at 05:02

## 2024-04-09 RX ADMIN — DEXTROMETHORPHAN 60 MG: 30 SUSPENSION, EXTENDED RELEASE ORAL at 23:34

## 2024-04-09 RX ADMIN — FUROSEMIDE 40 MG: 10 INJECTION INTRAMUSCULAR; INTRAVENOUS at 08:19

## 2024-04-09 RX ADMIN — RIVAROXABAN 20 MG: 20 TABLET, FILM COATED ORAL at 17:17

## 2024-04-09 RX ADMIN — ALPRAZOLAM 0.25 MG: 0.25 TABLET ORAL at 06:29

## 2024-04-09 RX ADMIN — LEVOTHYROXINE SODIUM 50 MCG: 50 TABLET ORAL at 05:03

## 2024-04-09 RX ADMIN — FUROSEMIDE 40 MG: 10 INJECTION INTRAMUSCULAR; INTRAVENOUS at 16:23

## 2024-04-09 RX ADMIN — NYSTATIN: 100000 POWDER TOPICAL at 16:23

## 2024-04-09 RX ADMIN — NYSTATIN: 100000 POWDER TOPICAL at 08:00

## 2024-04-09 RX ADMIN — SERTRALINE HYDROCHLORIDE 50 MG: 50 TABLET ORAL at 05:03

## 2024-04-09 RX ADMIN — CEFTRIAXONE SODIUM 2000 MG: 10 INJECTION, POWDER, FOR SOLUTION INTRAVENOUS at 05:03

## 2024-04-09 ASSESSMENT — ACTIVITIES OF DAILY LIVING (ADL): HOME_HEALTH_OASIS: 01

## 2024-04-09 ASSESSMENT — FIBROSIS 4 INDEX: FIB4 SCORE: 2.24

## 2024-04-09 NOTE — CARE PLAN
The patient is Stable - Low risk of patient condition declining or worsening    Shift Goals  Clinical Goals: safety, abx, wean O2  Patient Goals: eat  Family Goals: salbador    Progress made toward(s) clinical / shift goals:    Problem: Skin Integrity  Goal: Skin integrity is maintained or improved  Outcome: Progressing  Note: Skin integrity monitored throughout the shift. Pressure ulcer preventions measures in place. TAPs, q2 turns with wedges but pt able to turns self in bed, low airloss, foam pads for NC, and purerwick for moisture prevention.      Problem: Fall Risk  Goal: Patient will remain free from falls  Outcome: Progressing  Note: Pt educated on importance of calling nurse before getting up. Fall precautions in place. Bed locked in lowest position. Call light and belongings within reach. Wristband and proper sign placed. Bed alarm on. No skid socks applied. Room free of clutter.        Patient is not progressing towards the following goals:

## 2024-04-09 NOTE — PROGRESS NOTES
Hospital Medicine Daily Progress Note    Date of Service  4/8/2024    Chief Complaint  Donte Magaña is a 83 y.o. female admitted 4/7/2024 with dyspnea    Hospital Course  Donte Magaña is a 83 y.o. female who presented 4/7/2024 with persistent shortness of breath and cough.  Patient has a past medical history of atrial fibrillation on anticoagulation, hypertension, hypothyroidism.  She states that over the last week, she has had a persistent cough.  Patient denies any fevers, chills, nausea, vomiting.  She revisited the emergency department today for worsening shortness of breath.  Viral panel unremarkable.  She currently quires 2 L supplemental oxygen.  CT imaging was obtained showing multifocal pneumonia.  She was empirically started with IV antibiotics in the emergency department.  Patient will be admitted for acute hypoxic respiratory failure       Interval Problem Update  4/8  She is afebrile with normal vitals saturating 91 to 98% on 2 L nasal cannula.  CMP with mild hyperglycemia otherwise unremarkable, CBC MCV 98.2 otherwise unremarkable, blood cultures no growth to date, procalcitonin not elevated, BNP elevated, CRP minimally elevated 1.72.  CTA chest negative for PE has some bilateral groundglass opacities infection versus fluid, all infectious markers are negative.  TTE ordered showed normal LVEF and grade 2 diastolic dysfunction, moderate aortic insufficiency.  Will give a dose of Lasix.    I have discussed this patient's plan of care and discharge plan at IDT rounds today with Case Management, Nursing, Nursing leadership, and other members of the IDT team.    Consultants/Specialty  None    Code Status  Full Code    Disposition  The patient is not medically cleared for discharge to home or a post-acute facility.      I have placed the appropriate orders for post-discharge needs.    Review of Systems  ROS   Review of Systems   Constitutional:  Negative for chills and fever.   HENT:  Negative for  congestion, ear discharge, ear pain, nosebleeds and sinus pain.    Eyes:  Negative for double vision, photophobia, pain and discharge.   Respiratory:  Positive for cough and shortness of breath. Negative for hemoptysis and sputum production.    Cardiovascular:  Negative for chest pain, palpitations and orthopnea.   Gastrointestinal:  Negative for abdominal pain, diarrhea, nausea and vomiting.   Genitourinary:  Negative for frequency, hematuria and urgency.   Musculoskeletal:  Negative for back pain and neck pain.   Neurological:  Negative for dizziness and headaches.   Psychiatric/Behavioral:  Negative for hallucinations and substance abuse. The patient is not nervous/anxious and does not have insomnia.     Physical Exam  Temp:  [36.3 °C (97.3 °F)-37 °C (98.6 °F)] 36.4 °C (97.5 °F)  Pulse:  [81-90] 86  Resp:  [16-19] 16  BP: ()/(51-71) 124/68  SpO2:  [91 %-98 %] 98 %    Physical Exam  Vitals and nursing note reviewed.   Constitutional:       General: She is not in acute distress.     Appearance: She is not toxic-appearing.   HENT:      Head: Normocephalic and atraumatic.      Nose: Nose normal. No rhinorrhea.      Mouth/Throat:      Mouth: Mucous membranes are moist.      Pharynx: Oropharynx is clear.   Eyes:      General: No scleral icterus.     Extraocular Movements: Extraocular movements intact.      Conjunctiva/sclera: Conjunctivae normal.   Cardiovascular:      Rate and Rhythm: Normal rate and regular rhythm.      Pulses: Normal pulses.   Pulmonary:      Effort: Pulmonary effort is normal. No respiratory distress.      Breath sounds: Rales (basilar) present. No wheezing or rhonchi.   Abdominal:      Palpations: Abdomen is soft.      Tenderness: There is no abdominal tenderness. There is no guarding or rebound.   Musculoskeletal:         General: Normal range of motion.      Cervical back: Normal range of motion and neck supple. No rigidity.      Right lower leg: No edema.      Left lower leg: No edema.    Skin:     General: Skin is warm and dry.      Capillary Refill: Capillary refill takes less than 2 seconds.   Neurological:      General: No focal deficit present.      Mental Status: She is alert.      Cranial Nerves: No cranial nerve deficit.      Sensory: No sensory deficit.      Motor: No weakness.      Coordination: Coordination normal.   Psychiatric:         Mood and Affect: Mood normal.         Behavior: Behavior normal.         Thought Content: Thought content normal.         Judgment: Judgment normal.         Fluids    Intake/Output Summary (Last 24 hours) at 4/9/2024 0616  Last data filed at 4/8/2024 2200  Gross per 24 hour   Intake 240 ml   Output --   Net 240 ml       Laboratory  Recent Labs     04/07/24  1028 04/08/24  0314   WBC 7.4 9.3   RBC 4.20 4.50   HEMOGLOBIN 14.3 14.8   HEMATOCRIT 41.5 44.2   MCV 98.8* 98.2*   MCH 34.0* 32.9   MCHC 34.5 33.5   RDW 48.8 49.1   PLATELETCT 222 256   MPV 9.9 10.0     Recent Labs     04/07/24  1118 04/08/24  0314   SODIUM 133* 135   POTASSIUM 4.2 4.2   CHLORIDE 97 99   CO2 24 23   GLUCOSE 133* 118*   BUN 19 20   CREATININE 0.72 0.72   CALCIUM 9.9 9.4                   Imaging  EC-ECHOCARDIOGRAM COMPLETE W/O CONT   Final Result      CT-CTA CHEST PULMONARY ARTERY W/ RECONS   Final Result         1. No CT evidence of pulmonary embolism.      2. Ill-defined groundglass opacities in the bilateral lungs, right more than left, likely multifocal pneumonia.      3. No pleural effusion. No pneumothorax.      DX-CHEST-PORTABLE (1 VIEW)   Final Result      No acute process.           Assessment/Plan  * Acute respiratory failure with hypoxia (HCC)- (present on admission)  Assessment & Plan  Patient currently requiring 2 L supplemental oxygen  CT imaging: Ill-defined groundglass opacities in the bilateral lungs, right more than left, likely multifocal pneumonia.   No leukocytosis.  Procalcitonin negative  Follow-up cultures  Continue with IV ceftriaxone and  azithromycin    Advanced care planning/counseling discussion- (present on admission)  Assessment & Plan  I spent 17 minutes at bedside in the emergency department with nursing staff present with patient discussing work-up, results, diagnosis, prognosis.  CODE STATUS discussed with patient and wants to be full code       Persistent atrial fibrillation (HCC)- (present on admission)  Assessment & Plan  Patient takes atenolol every evening and Xarelto at home.  Continue with home medications  Followed by cardiology    Hypothyroidism- (present on admission)  Assessment & Plan  Continue synthroid     Pneumonia- (present on admission)  Assessment & Plan  Ill-defined groundglass opacities in the bilateral lungs, right more than left, likely multifocal pneumonia.   Continue with IV ceftriaxone and azithromycin         VTE prophylaxis:   SCDs/TEDs   therapeutic anticoagulation with xarelto 20 mg daily witih meals      I have performed a physical exam and reviewed and updated ROS and Plan today (4/8/2024). In review of yesterday's note (4/7/2024), there are no changes except as documented above.  Total time spent 53 minutes. I spent greater than 50% of the time for patient care, counseling, and coordination on this date, including unit/floor time, and face-to-face time with the patient as per interval events, my own review of patient's imaging and lab analysis and developing my assessment and plan above.

## 2024-04-09 NOTE — HOSPITAL COURSE
Donte Magaña is a 83 y.o. female who presented 4/7/2024 with persistent shortness of breath and cough.  Patient has a past medical history of atrial fibrillation on anticoagulation, hypertension, hypothyroidism.  She states that over the last week, she has had a persistent cough.  Patient denies any fevers, chills, nausea, vomiting.  She revisited the emergency department today for worsening shortness of breath.  Viral panel unremarkable.  She currently quires 2 L supplemental oxygen.  CT imaging was obtained showing multifocal pneumonia.  She was empirically started with IV antibiotics in the emergency department.  Patient will be admitted for acute hypoxic respiratory failure

## 2024-04-09 NOTE — CASE COMMUNICATION
Quality Review Completed for DC OASIS by LILLIE Stockton, RN on 4/8/2024:     Edits completed by LILLIE Stockton RN:  1.  is NA to Depression  per care plan interventions  2.  is yes to the flu data timeframe,  is #4 per EMR records no flu administered in 2023-24  3.  is D yes for HTN chronic disease management  4.  A and F is #1 CG assist per narrative  5.  unchecked A, checked F is taking ointment with indication pe r MAR  6.  is NA, no indication of significant med issue identified per EMR review  7. PC1927H changed to independent per  response  8. GX0052 E is independent per  response  9. [id number] is independent per  response  10. VT0115 I, J, K changed to independent per  response  11.  changed to 0 per narrative: In the last 24 hours did the patient wear oxygen: no.In the last 24 hours, when is the patient dyspneic  or noticeably Short of Breath : n/a.  12. [id number] is independent per  response   is also A for My Chart access

## 2024-04-09 NOTE — PROGRESS NOTES
Monitor Summary:  Rhythm: 100% paced  Rate: 80  Ectopies: occasional PVC  Measurement: .21/.11/.41

## 2024-04-09 NOTE — PROGRESS NOTES
Bedside report received from off going RN/tech: Anselmo, assumed care of patient.     Fall Risk Score: LOW RISK  Fall risk interventions in place: Place yellow fall risk ID band on patient, Provide patient/family education based on risk assessment, Educate patient/family to call staff for assistance when getting out of bed, Place fall precaution signage outside patient door, Place patient in room close to nursing station, Utilize bed/chair fall alarm, and Bed alarm connected correctly  Bed type: Low air loss (Ashutosh Score less than 17 interventions in place)  Patient on cardiac monitor: Yes  IVF/IV medications: Not Applicable   Oxygen: How many liters 2L  Bedside sitter: Not Applicable   Isolation: Not applicable

## 2024-04-09 NOTE — PROGRESS NOTES
Assumed care of patient and received report from RN. Tele monitor in place and patient in AV paced. VS stable. A&Ox4. Pain 0/10. POC discussed with patient and verbalized understanding. Call light in reach. Fall precautions in place. Bed locked in lowest position.         Fall Risk Score: HIGH RISK  Fall risk interventions in place: Place yellow fall risk ID band on patient, Provide patient/family education based on risk assessment, Educate patient/family to call staff for assistance when getting out of bed, Place fall precaution signage outside patient door, and Utilize bed/chair fall alarm  Bed type: Regular (Ashutosh Score less than 17 interventions in place)  Patient on cardiac monitor: Yes  IVF/IV medications: Not Applicable   Oxygen: How many liters 2L and Traced the line to wall oxygen  Bedside sitter: Not Applicable   Isolation: Not applicable

## 2024-04-09 NOTE — PROGRESS NOTES
Bedside report received from night RN, pt care assumed, assessment completed. Pt is A&O4, pain 0, av paced on the monitor. Updated on POC, questions answered. Bed in lowest, locked position, treaded socks on, call light and belongings within reach.

## 2024-04-09 NOTE — CARE PLAN
"  Problem: Knowledge Deficit - Standard  Goal: Patient and family/care givers will demonstrate understanding of plan of care, disease process/condition, diagnostic tests and medications  Outcome: Progressing     Problem: Skin Integrity  Goal: Skin integrity is maintained or improved  Outcome: Progressing     Problem: Fall Risk  Goal: Patient will remain free from falls  Outcome: Progressing     Problem: Pain - Standard  Goal: Alleviation of pain or a reduction in pain to the patient’s comfort goal  Outcome: Progressing   The patient is Stable - Low risk of patient condition declining or worsening    Shift Goals  Clinical Goals: Monitor VS; pain management; rest and sleep  Patient Goals: get well  Family Goals: salbador    Progress made toward(s) clinical / shift goals:   Administered medication as ordered, ambulated to the bathroom and tolerated fairly well, dyspnea was noted during activity, no complain of pain during rounding, repositioned multiple times during shift, call light within reach and bed alarm kept on,/57   Pulse 83   Temp 36.6 °C (97.9 °F) (Temporal)   Resp 16   Ht 1.626 m (5' 4\")   Wt 75.4 kg (166 lb 3.6 oz)   SpO2 95%        Patient is not progressing towards the following goals:      "

## 2024-04-09 NOTE — DISCHARGE PLANNING
Care Transition Team Assessment  LMSW conducted assessment and verified demographics. TCN collaboration. Pt lives at a Senior Apartment at [facility]. Prior to admission pt was independent with ALDS and the ILF provided assistance with IADLS like laundry, meals, and medication management. Pt uses a scooter and has a 4WW that she does not use. Pt does not use home O2 at [facility]. Pt is agreeable to  services.     Information Source  Orientation Level: Oriented X4  Information Given By: Patient, Other (Comments) (TCN)  Who is responsible for making decisions for patient? : Patient    Readmission Evaluation  Is this a readmission?: No    Elopement Risk  Legal Hold: No  Ambulatory or Self Mobile in Wheelchair: No-Not an Elopement Risk  Elopement Risk: Not at Risk for Elopement    Interdisciplinary Discharge Planning  Lives with - Patient's Self Care Capacity: Alone and Able to Care For Self  Patient or legal guardian wants to designate a caregiver: No  Support Systems: Family Member(s), Children, Home Care Staff  Housing / Facility: Assisted Living Residence, Independent Living Facility  Name of Care Facility: [facility]  Prior Services: Intermittent Physical Support for ADL Per Service  Durable Medical Equipment: Walker, Other - Specify (scooter)    Discharge Preparedness  What is your plan after discharge?: Home health care  What are your discharge supports?: Child, [facility]  Prior Functional Level: Independent with Activities of Daily Living, Other (Comments), Needs Assist with Medication Management (Uses Scooter)  Difficulity with ADLs: Walking  Difficulity with IADLs: Laundry, Cooking, Other (ILF assist with IADLS)    Functional Assesment  Prior Functional Level: Independent with Activities of Daily Living, Other (Comments), Needs Assist with Medication Management (Uses Scooter)    Finances  Financial Barriers to Discharge: No    Vision / Hearing Impairment  Right Eye Vision: Impaired,  Wears Glasses  Left Eye Vision: Impaired, Wears Glasses    Advance Directive  Advance Directive?: None    Domestic Abuse  Have you ever been the victim of abuse or violence?: No  Physical Abuse or Sexual Abuse: No  Verbal Abuse or Emotional Abuse: No  Possible Abuse/Neglect Reported to:: Not Applicable    Psychological Assessment  History of Substance Abuse: None  History of Psychiatric Problems: No  Non-compliant with Treatment: No  Newly Diagnosed Illness: No    Discharge Risks or Barriers  Discharge risks or barriers?: No    Anticipated Discharge Information  Discharge Disposition: D/T to home under A care in anticipation of covered skilled care (06)  Discharge Address: [address] [place] [address] [place] [id number]  Discharge Contact Phone Number: [phone]

## 2024-04-09 NOTE — WOUND TEAM
[facility]  Inpatient Services  Initial Wound and Skin Care Evaluation    Admission Date: 4/7/2024     Last order of IP CONSULT TO WOUND CARE was found on 4/8/2024 from Hospital Encounter on 4/7/2024     HPI, PMH, SH: Reviewed    Past Surgical History:   Procedure Laterality Date    TN COMBINED ANT/POST COLPORRHAPHY  1/14/2020    Procedure: COLPORRHAPHY, COMBINED ANTEROPOSTERIOR - PERINEOPLASTY;  Surgeon: Waqas Robin M.D.;  Location: SURGERY SAME DAY [facility];  Service: Gynecology    TN LAP,DIAGNOSTIC ABDOMEN  1/14/2020    Procedure: PELVISCOPY;  Surgeon: Waqas Robin M.D.;  Location: SURGERY SAME DAY [facility];  Service: Gynecology    ENTEROCELE REPAIR  1/14/2020    Procedure: REPAIR, ENTEROCELE;  Surgeon: Waqas Robin M.D.;  Location: SURGERY SAME DAY [facility];  Service: Gynecology    BLADDER SLING FEMALE  1/14/2020    Procedure: BLADDER SLING, FEMALE - TOT;  Surgeon: Waqas Robin M.D.;  Location: SURGERY SAME DAY [facility];  Service: Gynecology    VAGINAL SUSPENSION  1/14/2020    Procedure: COLPOPEXY - SACROSPINOUS VAULT SUSPENSION;  Surgeon: Waqas Robin M.D.;  Location: SURGERY SAME DAY [facility];  Service: Gynecology    SALPINGO OOPHORECTOMY Bilateral 1/14/2020    Procedure: SALPINGO-OOPHORECTOMY;  Surgeon: Waqas Robin M.D.;  Location: SURGERY SAME DAY [facility];  Service: Gynecology    IRRIGATION & DEBRIDEMENT ORTHO Right 2/14/2019    Procedure: IRRIGATION & DEBRIDEMENT ORTHO-HIP WOUND ;  Surgeon: Vladimir Lee M.D.;  Location: [facility];  Service: Orthopedics    HIP ARTH ANTERIOR TOTAL Right 1/17/2019    Procedure: HIP ARTHROPLASTY ANTERIOR TOTAL;  Surgeon: Juan C Mercedes M.D.;  Location: SURGERY [facility];  Service: Orthopedics    PACEMAKER INSERTION  06/30/2018    Dual Chamber    KNEE ARTHROPLASTY TOTAL Right 6/23/2016    Procedure: KNEE ARTHROPLASTY TOTAL;  Surgeon: Heriberto Lozada M.D.;  Location: [facility]  ORS;  Service:     KNEE ARTHROPLASTY TOTAL Left 5/28/2015    Procedure: KNEE ARTHROPLASTY TOTAL;  Surgeon: Heriberto Lozada M.D.;  Location: SURGERY [facility] ORS;  Service:     CATARACT PHACO WITH IOL Right 5/5/2015    Procedure: IOL OD - STANDARD;  Surgeon: Dmitry Bejarano M.D.;  Location: SURGERY [facility];  Service:     CATARACT PHACO WITH IOL  4/21/2015    Performed by Dmitry Bejarano M.D. at SURGERY [facility] ORS    RECOVERY  11/30/2010    Performed by SURGERY, [facility]-RECOVERY at SURGERY SAME DAY [facility] ORS    COLONOSCOPY  2008    Normal    [facility]    ABDOMINAL HYSTERECTOMY TOTAL  April 15,1975    still has ovaries    OPEN REDUCTION      left ankle    OTHER      Removed pins from left ankle    OTHER CARDIAC SURGERY  12/2017 and 07/19/2018     Cardiac Ablation    TONSILLECTOMY AND ADENOIDECTOMY       Social History     Tobacco Use    Smoking status: Never    Smokeless tobacco: Never   Substance Use Topics    Alcohol use: No     Chief Complaint   Patient presents with    Cough     Productive cough started approximately 1 hr ago per patient.     Shortness of Breath     Diagnosis: Acute respiratory failure with hypoxia (HCC) [J96.01]    Unit where seen by Wound Team: T810/00     WOUND CONSULT RELATED TO:  Bilateral breast folds and sacrum    WOUND TEAM PLAN OF CARE - Frequency of Follow-up:   Nursing to follow dressing orders written for wound care. Contact wound team if area fails to progress, deteriorates or with any questions/concerns if something comes up before next scheduled follow up (See below as to whether wound is following and frequency of wound follow up)   Not following, consult as needed  - any area    WOUND HISTORY:   Pt is an 83yr old female admitted with SOB and cough. Pt has history of A. Fib, HTN and hypothyroidism. Pt was noted to have multifocal pneumonia and was admitted. Pt was found to have purple discoloration to her sacrococcygeal area as well as moist red areas to  breast and groin folds.        WOUND ASSESSMENT/LDA  Wound 04/07/24 Pressure Injury Coccyx;Buttocks POA sDTI wiht scar tissue (Active)   Date First Assessed/Time First Assessed: 04/07/24 2200   Present on Original Admission: Yes  Hand Hygiene Completed: Yes  Primary Wound Type: Pressure Injury  Location: Coccyx;Buttocks  Wound Description (Comments): POA sDTI wiht scar tissue      Assessments 4/9/2024 11:00 AM   Wound Image      Site Assessment Purple   Periwound Assessment Clean;Dry;Intact   Margins Attached edges;Defined edges   Closure Open to air   Drainage Amount None   Treatments Cleansed;Nonselective debridement;Site care   Wound Cleansing Foam Cleanser/Washcloth   Periwound Protectant Barrier Paste   Dressing Status Clean;Dry;Intact   Dressing Changed Changed   Dressing Cleansing/Solutions Not Applicable   Dressing Change/Treatment Frequency Every Shift, and As Needed   NEXT Dressing Change/Treatment Date 04/09/24   NEXT Weekly Photo (Inpatient Only) 04/16/24   Wound Team Following Not following   Pressure Injury Stage Deep Tissue       Moisture Associated Skin Damage 04/07/24 Breast;Groin (Active)   First Observed Date: 04/07/24   Laterality: Bilateral  Wound Location : Breast;Groin      Assessments 4/9/2024 11:00 AM   Wound Image       NEXT Weekly Photo (Inpatient Only) 04/16/24   Drainage Amount None   Periwound Assessment Clean;Dry;Intact   IAD Cleansing Foam Cleanser/Washcloth   Periwound Protectant Antifungal Therapy;Interdry   WOUND NURSE ONLY - Time Spent with Patient (mins) 60             Vascular:    MARY BETH:   No results found.    Lab Values:    Lab Results   Component Value Date/Time    WBC 9.3 04/08/2024 03:14 AM    WBC 9.3 07/29/2010 12:00 AM    RBC 4.50 04/08/2024 03:14 AM    RBC 4.57 07/29/2010 12:00 AM    HEMOGLOBIN 14.8 04/08/2024 03:14 AM    HEMATOCRIT 44.2 04/08/2024 03:14 AM    CREACTPROT 1.72 (H) 04/07/2024 11:18 AM    SEDRATEWES 14 04/17/2014 12:52 PM    HBA1C 5.3 02/13/2019 07:57 PM          Culture Results show:  No results found for this or any previous visit (from the past 720 hour(s)).    Pain Level/Medicated:  None, Tolerated without pain medication       INTERVENTIONS BY WOUND TEAM:  Chart and images reviewed. Discussed with bedside RN. All areas of concern (based on picture review, LDA review and discussion with bedside RN) have been thoroughly assessed. Documentation of areas based on significant findings. This RN in to assess patient. Performed standard wound care which includes appropriate positioning, dressing removal and non-selective debridement. Pictures and measurements obtained weekly if/when required.    Wound:  Sacrococcygeal area sDTI POA  Preparation for Dressing removal: Open to air  Cleansed/Non-selectively Debrided with:  Moist warm washcloth  Edie wound: Cleansed with Moist warm washcloth, Prepped with Barrier paste  Primary Dressing:  Barrier paste and turns     Wound:  Breast and Groin folds  Appear to be nearly resolved. Continued with nystatin and interdry cloth     Area Assessed:  Bilateral heels  Red but blanching, offloading dressing an pillows in use    Advanced Wound Care Discharge Planning  Number of Clinicians necessary to complete wound care: 1-2  Is patient requiring IV pain medications for dressing changes:  No   Length of time for dressing change 30 min. (This does not include chart review, pre-medication time, set up, clean up or time spent charting.)    Interdisciplinary consultation: Patient, Bedside RN (Leena), Rosalie BLACKBURN (Wound RN).  Pressure injury and staging reviewed with Rosalie BLACKBURN (Wound RN).    EVALUATION / RATIONALE FOR TREATMENT:     Date:  04/09/24  Wound Status:  Initial evaluation    Pt with discoloration and scar tissue to her sacrococcygeal area likely old pressure injury in combination with venous pooling and scar tissue. Barrier paste to dry and soothe the area.         Goals: Steady decrease in wound area and depth weekly.    NURSING  PLAN OF CARE ORDERS:  RN Prevention Protocol    NUTRITION RECOMMENDATIONS   Wound Team Recommendations:  N/A    DIET ORDERS (From admission to next 24h)       Start     Ordered    04/07/24 1647  Diet Order Diet: Cardiac  ALL MEALS        Question:  Diet:  Answer:  Cardiac    04/07/24 1647                    PREVENTATIVE INTERVENTIONS:    Q shift Ashutosh - performed per nursing policy  Q shift pressure point assessments - performed per nursing policy    Surface/Positioning  Low Airloss - Currently in Place  Reposition q 2 hours - Currently in Place    Offloading/Redistribution  Heel offloading dressing (Silicone dressing) - Currently in Place      Respiratory  Silicone O2 tubing - Currently in Place  Gray Foam Ear protectors - Currently in Place    Containment/Moisture Prevention    Dri-yenifer pad - Currently in Place  Purwick/Condom Cath - Applied this Visit  Barrier paste - Ordered  Interdry - Ordered    Anticipated discharge plans:  TBD        Vac Discharge Needs:  Vac Discharge plan is purely a recommendation from wound team and not a requirement for discharge unless otherwise stated by physician.  Not Applicable Pt not on a wound vac

## 2024-04-10 ENCOUNTER — APPOINTMENT (OUTPATIENT)
Dept: RADIOLOGY | Facility: MEDICAL CENTER | Age: 84
DRG: 189 | End: 2024-04-10
Attending: STUDENT IN AN ORGANIZED HEALTH CARE EDUCATION/TRAINING PROGRAM
Payer: MEDICARE

## 2024-04-10 ENCOUNTER — HOME HEALTH ADMISSION (OUTPATIENT)
Dept: HOME HEALTH SERVICES | Facility: HOME HEALTHCARE | Age: 84
End: 2024-04-10
Payer: MEDICARE

## 2024-04-10 PROBLEM — I50.31 ACUTE HEART FAILURE WITH PRESERVED EJECTION FRACTION (HFPEF) (HCC): Status: ACTIVE | Noted: 2024-04-10

## 2024-04-10 LAB
ALBUMIN SERPL BCP-MCNC: 4 G/DL (ref 3.2–4.9)
ALBUMIN/GLOB SERPL: 1.3 G/DL
ALP SERPL-CCNC: 78 U/L (ref 30–99)
ALT SERPL-CCNC: 12 U/L (ref 2–50)
ANION GAP SERPL CALC-SCNC: 16 MMOL/L (ref 7–16)
AST SERPL-CCNC: 19 U/L (ref 12–45)
BASOPHILS # BLD AUTO: 0.7 % (ref 0–1.8)
BASOPHILS # BLD: 0.06 K/UL (ref 0–0.12)
BILIRUB SERPL-MCNC: 0.3 MG/DL (ref 0.1–1.5)
BUN SERPL-MCNC: 37 MG/DL (ref 8–22)
CALCIUM ALBUM COR SERPL-MCNC: 9.4 MG/DL (ref 8.5–10.5)
CALCIUM SERPL-MCNC: 9.4 MG/DL (ref 8.5–10.5)
CHLORIDE SERPL-SCNC: 99 MMOL/L (ref 96–112)
CO2 SERPL-SCNC: 23 MMOL/L (ref 20–33)
CREAT SERPL-MCNC: 1.78 MG/DL (ref 0.5–1.4)
CRP SERPL HS-MCNC: 5.81 MG/DL (ref 0–0.75)
EOSINOPHIL # BLD AUTO: 0.54 K/UL (ref 0–0.51)
EOSINOPHIL NFR BLD: 6.4 % (ref 0–6.9)
ERYTHROCYTE [DISTWIDTH] IN BLOOD BY AUTOMATED COUNT: 48.8 FL (ref 35.9–50)
ERYTHROCYTE [SEDIMENTATION RATE] IN BLOOD BY WESTERGREN METHOD: 20 MM/HOUR (ref 0–25)
GFR SERPLBLD CREATININE-BSD FMLA CKD-EPI: 28 ML/MIN/1.73 M 2
GLOBULIN SER CALC-MCNC: 3.1 G/DL (ref 1.9–3.5)
GLUCOSE SERPL-MCNC: 120 MG/DL (ref 65–99)
HCT VFR BLD AUTO: 41.9 % (ref 37–47)
HGB BLD-MCNC: 13.7 G/DL (ref 12–16)
IMM GRANULOCYTES # BLD AUTO: 0.05 K/UL (ref 0–0.11)
IMM GRANULOCYTES NFR BLD AUTO: 0.6 % (ref 0–0.9)
LYMPHOCYTES # BLD AUTO: 1.97 K/UL (ref 1–4.8)
LYMPHOCYTES NFR BLD: 23.2 % (ref 22–41)
MAGNESIUM SERPL-MCNC: 2.1 MG/DL (ref 1.5–2.5)
MCH RBC QN AUTO: 32.5 PG (ref 27–33)
MCHC RBC AUTO-ENTMCNC: 32.7 G/DL (ref 32.2–35.5)
MCV RBC AUTO: 99.5 FL (ref 81.4–97.8)
MONOCYTES # BLD AUTO: 0.59 K/UL (ref 0–0.85)
MONOCYTES NFR BLD AUTO: 6.9 % (ref 0–13.4)
NEUTROPHILS # BLD AUTO: 5.29 K/UL (ref 1.82–7.42)
NEUTROPHILS NFR BLD: 62.2 % (ref 44–72)
NRBC # BLD AUTO: 0 K/UL
NRBC BLD-RTO: 0 /100 WBC (ref 0–0.2)
PHOSPHATE SERPL-MCNC: 3.5 MG/DL (ref 2.5–4.5)
PLATELET # BLD AUTO: 286 K/UL (ref 164–446)
PMV BLD AUTO: 9.9 FL (ref 9–12.9)
POTASSIUM SERPL-SCNC: 4.2 MMOL/L (ref 3.6–5.5)
PROCALCITONIN SERPL-MCNC: 0.19 NG/ML
PROT SERPL-MCNC: 7.1 G/DL (ref 6–8.2)
RBC # BLD AUTO: 4.21 M/UL (ref 4.2–5.4)
SODIUM SERPL-SCNC: 138 MMOL/L (ref 135–145)
WBC # BLD AUTO: 8.5 K/UL (ref 4.8–10.8)

## 2024-04-10 PROCEDURE — 84145 PROCALCITONIN (PCT): CPT

## 2024-04-10 PROCEDURE — 700102 HCHG RX REV CODE 250 W/ 637 OVERRIDE(OP)

## 2024-04-10 PROCEDURE — 85652 RBC SED RATE AUTOMATED: CPT

## 2024-04-10 PROCEDURE — 97530 THERAPEUTIC ACTIVITIES: CPT

## 2024-04-10 PROCEDURE — 99233 SBSQ HOSP IP/OBS HIGH 50: CPT | Performed by: STUDENT IN AN ORGANIZED HEALTH CARE EDUCATION/TRAINING PROGRAM

## 2024-04-10 PROCEDURE — 80053 COMPREHEN METABOLIC PANEL: CPT

## 2024-04-10 PROCEDURE — 71045 X-RAY EXAM CHEST 1 VIEW: CPT

## 2024-04-10 PROCEDURE — A9270 NON-COVERED ITEM OR SERVICE: HCPCS

## 2024-04-10 PROCEDURE — 94640 AIRWAY INHALATION TREATMENT: CPT

## 2024-04-10 PROCEDURE — 94669 MECHANICAL CHEST WALL OSCILL: CPT

## 2024-04-10 PROCEDURE — 86140 C-REACTIVE PROTEIN: CPT

## 2024-04-10 PROCEDURE — 700102 HCHG RX REV CODE 250 W/ 637 OVERRIDE(OP): Performed by: STUDENT IN AN ORGANIZED HEALTH CARE EDUCATION/TRAINING PROGRAM

## 2024-04-10 PROCEDURE — A9270 NON-COVERED ITEM OR SERVICE: HCPCS | Performed by: STUDENT IN AN ORGANIZED HEALTH CARE EDUCATION/TRAINING PROGRAM

## 2024-04-10 PROCEDURE — 700111 HCHG RX REV CODE 636 W/ 250 OVERRIDE (IP): Mod: JZ | Performed by: STUDENT IN AN ORGANIZED HEALTH CARE EDUCATION/TRAINING PROGRAM

## 2024-04-10 PROCEDURE — 51798 US URINE CAPACITY MEASURE: CPT

## 2024-04-10 PROCEDURE — 85025 COMPLETE CBC W/AUTO DIFF WBC: CPT

## 2024-04-10 PROCEDURE — 84100 ASSAY OF PHOSPHORUS: CPT

## 2024-04-10 PROCEDURE — 36415 COLL VENOUS BLD VENIPUNCTURE: CPT

## 2024-04-10 PROCEDURE — 83735 ASSAY OF MAGNESIUM: CPT

## 2024-04-10 PROCEDURE — 700101 HCHG RX REV CODE 250: Performed by: STUDENT IN AN ORGANIZED HEALTH CARE EDUCATION/TRAINING PROGRAM

## 2024-04-10 PROCEDURE — 770020 HCHG ROOM/CARE - TELE (206)

## 2024-04-10 RX ORDER — IPRATROPIUM BROMIDE AND ALBUTEROL SULFATE 2.5; .5 MG/3ML; MG/3ML
3 SOLUTION RESPIRATORY (INHALATION)
Status: DISCONTINUED | OUTPATIENT
Start: 2024-04-10 | End: 2024-04-12 | Stop reason: HOSPADM

## 2024-04-10 RX ORDER — BENZONATATE 100 MG/1
100 CAPSULE ORAL 3 TIMES DAILY PRN
Status: DISCONTINUED | OUTPATIENT
Start: 2024-04-10 | End: 2024-04-12 | Stop reason: HOSPADM

## 2024-04-10 RX ORDER — ALPRAZOLAM 0.25 MG/1
0.25 TABLET ORAL
Status: DISCONTINUED | OUTPATIENT
Start: 2024-04-10 | End: 2024-04-12 | Stop reason: HOSPADM

## 2024-04-10 RX ORDER — FUROSEMIDE 10 MG/ML
60 INJECTION INTRAMUSCULAR; INTRAVENOUS
Status: DISCONTINUED | OUTPATIENT
Start: 2024-04-11 | End: 2024-04-12 | Stop reason: HOSPADM

## 2024-04-10 RX ORDER — FUROSEMIDE 10 MG/ML
40 INJECTION INTRAMUSCULAR; INTRAVENOUS
Status: DISCONTINUED | OUTPATIENT
Start: 2024-04-10 | End: 2024-04-10

## 2024-04-10 RX ORDER — IPRATROPIUM BROMIDE AND ALBUTEROL SULFATE 2.5; .5 MG/3ML; MG/3ML
3 SOLUTION RESPIRATORY (INHALATION)
Status: DISPENSED | OUTPATIENT
Start: 2024-04-10 | End: 2024-04-11

## 2024-04-10 RX ORDER — SPIRONOLACTONE 25 MG/1
25 TABLET ORAL
Status: DISCONTINUED | OUTPATIENT
Start: 2024-04-10 | End: 2024-04-12 | Stop reason: HOSPADM

## 2024-04-10 RX ADMIN — RIVAROXABAN 20 MG: 20 TABLET, FILM COATED ORAL at 17:21

## 2024-04-10 RX ADMIN — NYSTATIN: 100000 POWDER TOPICAL at 17:21

## 2024-04-10 RX ADMIN — DEXTROMETHORPHAN 60 MG: 30 SUSPENSION, EXTENDED RELEASE ORAL at 17:21

## 2024-04-10 RX ADMIN — ALPRAZOLAM 0.25 MG: 0.25 TABLET ORAL at 11:50

## 2024-04-10 RX ADMIN — ALPRAZOLAM 0.25 MG: 0.25 TABLET ORAL at 01:55

## 2024-04-10 RX ADMIN — BENZONATATE 100 MG: 100 CAPSULE ORAL at 20:41

## 2024-04-10 RX ADMIN — LISINOPRIL 5 MG: 5 TABLET ORAL at 06:18

## 2024-04-10 RX ADMIN — IPRATROPIUM BROMIDE AND ALBUTEROL SULFATE 3 ML: 2.5; .5 SOLUTION RESPIRATORY (INHALATION) at 22:38

## 2024-04-10 RX ADMIN — IPRATROPIUM BROMIDE AND ALBUTEROL SULFATE 3 ML: 2.5; .5 SOLUTION RESPIRATORY (INHALATION) at 14:34

## 2024-04-10 RX ADMIN — BENZONATATE 100 MG: 100 CAPSULE ORAL at 02:42

## 2024-04-10 RX ADMIN — VIBEGRON 75 MG: 75 TABLET, FILM COATED ORAL at 06:19

## 2024-04-10 RX ADMIN — LEVOTHYROXINE SODIUM 50 MCG: 50 TABLET ORAL at 06:19

## 2024-04-10 RX ADMIN — ATENOLOL 50 MG: 50 TABLET ORAL at 17:21

## 2024-04-10 RX ADMIN — SERTRALINE HYDROCHLORIDE 50 MG: 50 TABLET ORAL at 06:19

## 2024-04-10 RX ADMIN — DEXTROMETHORPHAN 60 MG: 30 SUSPENSION, EXTENDED RELEASE ORAL at 06:50

## 2024-04-10 RX ADMIN — SPIRONOLACTONE 25 MG: 25 TABLET ORAL at 14:13

## 2024-04-10 RX ADMIN — FUROSEMIDE 40 MG: 10 INJECTION INTRAMUSCULAR; INTRAVENOUS at 15:59

## 2024-04-10 RX ADMIN — FUROSEMIDE 40 MG: 10 INJECTION INTRAMUSCULAR; INTRAVENOUS at 06:18

## 2024-04-10 RX ADMIN — IPRATROPIUM BROMIDE AND ALBUTEROL SULFATE 3 ML: 2.5; .5 SOLUTION RESPIRATORY (INHALATION) at 18:40

## 2024-04-10 RX ADMIN — NYSTATIN: 100000 POWDER TOPICAL at 06:20

## 2024-04-10 ASSESSMENT — ENCOUNTER SYMPTOMS
FEVER: 0
ABDOMINAL PAIN: 0
SPUTUM PRODUCTION: 1
COUGH: 1
CHILLS: 0
MYALGIAS: 0
DIZZINESS: 0
HEADACHES: 0
SHORTNESS OF BREATH: 1
PALPITATIONS: 0
NAUSEA: 0
VOMITING: 0

## 2024-04-10 ASSESSMENT — COPD QUESTIONNAIRES
COPD SCREENING SCORE: 3
DURING THE PAST 4 WEEKS HOW MUCH DID YOU FEEL SHORT OF BREATH: NONE/LITTLE OF THE TIME
DO YOU EVER COUGH UP ANY MUCUS OR PHLEGM?: YES, A FEW DAYS A WEEK OR MONTH
HAVE YOU SMOKED AT LEAST 100 CIGARETTES IN YOUR ENTIRE LIFE: NO/DON'T KNOW

## 2024-04-10 ASSESSMENT — GAIT ASSESSMENTS: GAIT LEVEL OF ASSIST: UNABLE TO PARTICIPATE

## 2024-04-10 ASSESSMENT — COGNITIVE AND FUNCTIONAL STATUS - GENERAL
MOBILITY SCORE: 18
SUGGESTED CMS G CODE MODIFIER MOBILITY: CK
WALKING IN HOSPITAL ROOM: A LITTLE
STANDING UP FROM CHAIR USING ARMS: A LITTLE
CLIMB 3 TO 5 STEPS WITH RAILING: A LITTLE
MOVING TO AND FROM BED TO CHAIR: A LITTLE
MOVING FROM LYING ON BACK TO SITTING ON SIDE OF FLAT BED: A LITTLE
TURNING FROM BACK TO SIDE WHILE IN FLAT BAD: A LITTLE

## 2024-04-10 ASSESSMENT — FIBROSIS 4 INDEX: FIB4 SCORE: 2.24

## 2024-04-10 ASSESSMENT — PAIN DESCRIPTION - PAIN TYPE: TYPE: ACUTE PAIN

## 2024-04-10 NOTE — THERAPY
"Physical Therapy   Daily Treatment     Patient Name: Donte Magaña  Age:  83 y.o., Sex:  female  Medical Record #: [id number]  Today's Date: 4/10/2024     Precautions  Precautions: Fall Risk  Comments: hypotensive    Assessment    Pt limited 2/2 hypotension today, see vitals below. Pt requiring SPV for bed mobility, CGA for STS, returned to supine. Pt unable to tolerate HOB >30 hemodynamically at end of session. Pt continues to present with generalized weakness, impaired balance, activity tolerance, endurance, cognition. Will continue to follow to address said deficits.      Plan    Treatment Plan Status: Continue Current Treatment Plan  Type of Treatment: Bed Mobility, Gait Training, Neuro Re-Education / Balance, Self Care / Home Evaluation, Therapeutic Activities, Therapeutic Exercise  Treatment Frequency: 4 Times per Week  Treatment Duration: Until Therapy Goals Met    DC Equipment Recommendations: None  Discharge Recommendations: Recommend home health for continued physical therapy services (if [facility] able to provide assist for all mobility as needed, Otherwise, recommend placement)      Subjective    \"I'm from [place], but I've been for 70 something years.\"      Objective     04/10/24 1503   Vitals   Blood Pressure    (seated 93/53, ; standing 79/51, HR 99; return supine 96/48, HR 94; HOB 45 80/40; HOB 30 degrees 85/41, HR 83. Symptomatic, RN made aware)   Pulse Oximetry 93 %   O2 (LPM) 2   O2 Delivery Device Silicone Nasal Cannula   Pain 0 - 10 Group   Therapist Pain Assessment Post Activity Pain Same as Prior to Activity;Nurse Notified;0   Cognition    Cognition / Consciousness X   Speech/ Communication Delayed Responses   Level of Consciousness Alert   Comments Pleasant and cooperative, endorses poor memory   Active ROM Lower Body    Active ROM Lower Body  WDL   Strength Lower Body   Lower Body Strength  X   Comments not formally assessed, BLE grossly 3+ to 4/5 during STS   Supine Lower Body Exercise "   Supine Lower Body Exercises Yes   Comments brief demo of supine ankle pumps, heel slides, SLR. Instructed to perform 10-20 of each bilaterally 3x/day   Sitting Lower Body Exercises   Comments 1x10 B LAQ   Balance   Sitting Balance (Static) Fair   Sitting Balance (Dynamic) Fair   Standing Balance (Static) Fair -   Standing Balance (Dynamic) Fair -   Weight Shift Sitting Fair   Weight Shift Standing Fair   Skilled Intervention Verbal Cuing;Tactile Cuing   Comments w/ FWW   Bed Mobility    Supine to Sit Supervised   Sit to Supine Supervised   Scooting Supervised   Rolling Supervised   Skilled Intervention Verbal Cuing   Comments HOB raised, use of rail   Gait Analysis   Gait Level Of Assist Unable to Participate   Comments unable due to hypotension symptomatic   Functional Mobility   Sit to Stand Contact Guard Assist  (pt utilizing rocking, few unsuccessful attempts before completing full STS)   Bed, Chair, Wheelchair Transfer Unable to Participate   Mobility supine, EOB, STS, return supine   Skilled Intervention Verbal Cuing;Tactile Cuing;Compensatory Strategies   6 Clicks Assessment - How much HELP from from another person do you currently need... (If the patient hasn't done an activity recently, how much help from another person do you think he/she would need if he/she tried?)   Turning from your back to your side while in a flat bed without using bedrails? 3   Moving from lying on your back to sitting on the side of a flat bed without using bedrails? 3   Moving to and from a bed to a chair (including a wheelchair)? 3   Standing up from a chair using your arms (e.g., wheelchair, or bedside chair)? 3   Walking in hospital room? 3   Climbing 3-5 steps with a railing? 3   6 clicks Mobility Score 18   Activity Tolerance   Sitting in Chair unable   Sitting Edge of Bed 5-8 min   Standing 1 min   Comments limited 2/2 hypotension   Short Term Goals    Short Term Goal # 1 Pt will be able to perform STS with FWW and SPV in  6 visits to progress functional transfers   Goal Outcome # 1 goal not met   Short Term Goal # 2 Pt will be able to ambulate 150ft with FWW and SPV in 6 visits to progress functional gait   Goal Outcome # 2 Goal not met   Education Group   Education Provided Role of Physical Therapist   Role of Physical Therapist Patient Response Patient;Acceptance;Explanation;Verbal Demonstration   Additional Comments also supine/seated therex, pt receptive   Physical Therapy Treatment Plan   Physical Therapy Treatment Plan Continue Current Treatment Plan   Anticipated Discharge Equipment and Recommendations   DC Equipment Recommendations None   Discharge Recommendations Recommend home health for continued physical therapy services  (if [facility] able to provide assist for all mobility as needed, Otherwise, recommend placement)   Interdisciplinary Plan of Care Collaboration   IDT Collaboration with  Nursing   Patient Position at End of Therapy In Bed;Bed Alarm On;Call Light within Reach;Tray Table within Reach;Phone within Reach   Collaboration Comments RN updated   Session Information   Date / Session Number  4/10- 2 (2/4, 4/14)

## 2024-04-10 NOTE — ASSESSMENT & PLAN NOTE
With no leukocytosis, procal negative x2, afebrile, elevated BNP I suspect her symptoms are more related to HFpEF than a pneumonia.  Diuresis   Monitor intake and output  Tele monitoring  Assess response to diuretics, monitor respiratory status and BP    04/10/24  Continue furosemide 40 mg IV twice daily  Continuous cardiac monitoring during forced diuresis to monitor for arrhythmias  Monitor electrolytes very closely  Strict ins and outs and daily weights.    4/11/2024  Hold diuresis due to TANIA   bolus, rate 50 ml/hr

## 2024-04-10 NOTE — PROGRESS NOTES
Bedside report received from night RN, pt care assumed, assessment completed. Pt is A&O4, pain 0, paced on the monitor. Updated on POC, questions answered. Bed in lowest, locked position, treaded socks on, call light and belongings within reach.

## 2024-04-10 NOTE — PROGRESS NOTES
Hospital Medicine Daily Progress Note    Date of Service  4/9/2024    Chief Complaint  Donte Magaña is a 83 y.o. female admitted 4/7/2024 with dyspnea    Hospital Course  Donte Magaña is a 83 y.o. female who presented 4/7/2024 with persistent shortness of breath and cough.  Patient has a past medical history of atrial fibrillation on anticoagulation, hypertension, hypothyroidism.  She states that over the last week, she has had a persistent cough.  Patient denies any fevers, chills, nausea, vomiting.  She revisited the emergency department today for worsening shortness of breath.  Viral panel unremarkable.  She currently quires 2 L supplemental oxygen.  CT imaging was obtained showing multifocal pneumonia.  She was empirically started with IV antibiotics in the emergency department.  Patient will be admitted for acute hypoxic respiratory failure       Interval Problem Update  4/8  She is afebrile with normal vitals saturating 91 to 98% on 2 L nasal cannula.  CMP with mild hyperglycemia otherwise unremarkable, CBC MCV 98.2 otherwise unremarkable, blood cultures no growth to date, procalcitonin not elevated, BNP elevated, CRP minimally elevated 1.72.  CTA chest negative for PE has some bilateral groundglass opacities infection versus fluid, all infectious markers are negative.  TTE ordered showed normal LVEF and grade 2 diastolic dysfunction, moderate aortic insufficiency.  Will give a dose of Lasix.    4/9  Afebrile normal pulse and respiratory rate systolic blood pressure 90s to 110s pulse ox 90 to 96% on 1 L nasal cannula.  Increased diuresis today not particularly robust response to Lasix, low suspicion for pneumonia at this time, suspect HFpEF is causing her symptoms, monitor clinical course and restart antibiotics as indicated.  Feeling little weak.  Tolerating p.o. intake.    I have discussed this patient's plan of care and discharge plan at IDT rounds today with Case Management, Nursing, Nursing  leadership, and other members of the IDT team.    Consultants/Specialty  None    Code Status  Full Code    Disposition  Medically Cleared  I have placed the appropriate orders for post-discharge needs.    Review of Systems  ROS   Review of Systems   Constitutional:  Negative for chills and fever.   HENT:  Negative for congestion, ear discharge, ear pain, nosebleeds and sinus pain.    Eyes:  Negative for double vision, photophobia, pain and discharge.   Respiratory:  Positive for cough and shortness of breath. Negative for hemoptysis and sputum production.    Cardiovascular:  Negative for chest pain, palpitations and orthopnea.   Gastrointestinal:  Negative for abdominal pain, diarrhea, nausea and vomiting.   Genitourinary:  Negative for frequency, hematuria and urgency.   Musculoskeletal:  Negative for back pain and neck pain.   Neurological:  Negative for dizziness and headaches.   Psychiatric/Behavioral:  Negative for hallucinations and substance abuse. The patient is not nervous/anxious and does not have insomnia.     Physical Exam  Temp:  [36.3 °C (97.3 °F)-37 °C (98.6 °F)] 37 °C (98.6 °F)  Pulse:  [65-83] 83  Resp:  [15-18] 15  BP: ()/(42-60) 99/51  SpO2:  [92 %-96 %] 96 %    Physical Exam  Vitals and nursing note reviewed.   Constitutional:       General: She is not in acute distress.     Appearance: She is not toxic-appearing.   HENT:      Head: Normocephalic and atraumatic.      Nose: Nose normal. No rhinorrhea.      Mouth/Throat:      Mouth: Mucous membranes are moist.      Pharynx: Oropharynx is clear.   Eyes:      General: No scleral icterus.     Extraocular Movements: Extraocular movements intact.      Conjunctiva/sclera: Conjunctivae normal.      Pupils: Pupils are equal, round, and reactive to light.   Cardiovascular:      Rate and Rhythm: Normal rate and regular rhythm.      Pulses: Normal pulses.   Pulmonary:      Effort: Pulmonary effort is normal. No respiratory distress.      Breath sounds:  Rales (basilar) present. No wheezing or rhonchi.   Abdominal:      Palpations: Abdomen is soft.      Tenderness: There is no abdominal tenderness. There is no guarding or rebound.   Musculoskeletal:         General: Normal range of motion.      Cervical back: Normal range of motion and neck supple. No rigidity.      Right lower leg: No edema.      Left lower leg: No edema.   Skin:     General: Skin is warm and dry.      Capillary Refill: Capillary refill takes less than 2 seconds.      Coloration: Skin is not jaundiced.   Neurological:      General: No focal deficit present.      Mental Status: She is alert.      Cranial Nerves: No cranial nerve deficit.      Sensory: No sensory deficit.      Motor: No weakness.      Coordination: Coordination normal.   Psychiatric:         Mood and Affect: Mood normal.         Behavior: Behavior normal.         Thought Content: Thought content normal.         Judgment: Judgment normal.         Fluids    Intake/Output Summary (Last 24 hours) at 4/10/2024 0610  Last data filed at 4/10/2024 0300  Gross per 24 hour   Intake 1000 ml   Output 550 ml   Net 450 ml       Laboratory  Recent Labs     04/07/24  1028 04/08/24  0314   WBC 7.4 9.3   RBC 4.20 4.50   HEMOGLOBIN 14.3 14.8   HEMATOCRIT 41.5 44.2   MCV 98.8* 98.2*   MCH 34.0* 32.9   MCHC 34.5 33.5   RDW 48.8 49.1   PLATELETCT 222 256   MPV 9.9 10.0     Recent Labs     04/07/24  1118 04/08/24  0314   SODIUM 133* 135   POTASSIUM 4.2 4.2   CHLORIDE 97 99   CO2 24 23   GLUCOSE 133* 118*   BUN 19 20   CREATININE 0.72 0.72   CALCIUM 9.9 9.4                   Imaging  EC-ECHOCARDIOGRAM COMPLETE W/O CONT   Final Result      CT-CTA CHEST PULMONARY ARTERY W/ RECONS   Final Result         1. No CT evidence of pulmonary embolism.      2. Ill-defined groundglass opacities in the bilateral lungs, right more than left, likely multifocal pneumonia.      3. No pleural effusion. No pneumothorax.      DX-CHEST-PORTABLE (1 VIEW)   Final Result      No  acute process.           Assessment/Plan  * Acute respiratory failure with hypoxia (HCC)- (present on admission)  Assessment & Plan  Patient currently requiring 2 L supplemental oxygen  CT imaging: Ill-defined groundglass opacities in the bilateral lungs, right more than left, likely multifocal pneumonia.   No leukocytosis.  Procalcitonin negative  Follow-up cultures  Continue with IV ceftriaxone and azithromycin    Acute heart failure with preserved ejection fraction (HFpEF) (HCC)  Assessment & Plan  With no leukocytosis, procal negative x2, afebrile, elevated BNP I suspect her symptoms are more related to HFpEF than a pneumonia.  Diuresis   Monitor intake and output  Tele monitoring  Assess response to diuretics, monitor respiratory status and BP    Advanced care planning/counseling discussion- (present on admission)  Assessment & Plan  I spent 17 minutes at bedside in the emergency department with nursing staff present with patient discussing work-up, results, diagnosis, prognosis.  CODE STATUS discussed with patient and wants to be full code       Persistent atrial fibrillation (HCC)- (present on admission)  Assessment & Plan  Patient takes atenolol every evening and Xarelto at home.  Continue with home medications  Followed by cardiology    Hypothyroidism- (present on admission)  Assessment & Plan  Continue synthroid     Pneumonia- (present on admission)  Assessment & Plan  Ill-defined groundglass opacities in the bilateral lungs, right more than left, likely multifocal pneumonia.   Continue with IV ceftriaxone and azithromycin         VTE prophylaxis:    therapeutic anticoagulation with xarelto 20 mg daily witih meals      I have performed a physical exam and reviewed and updated ROS and Plan today (4/9/2024). In review of yesterday's note (4/8/2024), there are no changes except as documented above.  Total time spent 53 minutes. I spent greater than 50% of the time for patient care, counseling, and  coordination on this date, including unit/floor time, and face-to-face time with the patient as per interval events, my own review of patient's imaging and lab analysis and developing my assessment and plan above.

## 2024-04-10 NOTE — DISCHARGE PLANNING
ATTN: Case Management  RE: Referral for Home Health    As of 4/10/2024, we have accepted the Home Health referral for the patient listed above.    A [facility] clinician will be out to see the patient within 48 hours. If you have any questions or concerns regarding the patient’s transition to Home Health, please do not hesitate to contact us at [phone].      We look forward to collaborating with you,  [facility] Health Team

## 2024-04-10 NOTE — PROGRESS NOTES
Bedside report received from off going RN/tech: Orin, assumed care of patient.     Fall Risk Score: MODERATE RISK  Fall risk interventions in place: Place yellow fall risk ID band on patient, Provide patient/family education based on risk assessment, Educate patient/family to call staff for assistance when getting out of bed, Place fall precaution signage outside patient door, Place patient in room close to nursing station, Utilize bed/chair fall alarm, and Bed alarm connected correctly  Bed type: Regular (Ashutosh Score less than 17 interventions in place)  Patient on cardiac monitor: Yes  IVF/IV medications: Not Applicable   Oxygen: How many liters 1L  Bedside sitter: Not Applicable   Isolation: Not applicable

## 2024-04-10 NOTE — DISCHARGE PLANNING
HTH/SCP TCN chart review completed. Collaborated with MATY Marie.  Current discharge considerations are for discharge home with possible Home Health and close outpatient f/u when medically cleared.  No new TCN needs.  Patient does not use O2 at baseline.  TCN will continue to follow and collaborate with discharge planning team as additional post acute needs arise. Thank you.    Completed:   PT recommends HH on 4/8/24.    Choice obtained: HH ([facility]) & DME ([facility])  SCP with [facility] PCP.  Referred to schedulers for Follow up appointment.  F/u scheduled for 04.15.24 at 1:40 pm Dr Laird.

## 2024-04-10 NOTE — CARE PLAN
"  Problem: Knowledge Deficit - Standard  Goal: Patient and family/care givers will demonstrate understanding of plan of care, disease process/condition, diagnostic tests and medications  Outcome: Progressing     Problem: Skin Integrity  Goal: Skin integrity is maintained or improved  Outcome: Progressing     Problem: Fall Risk  Goal: Patient will remain free from falls  Outcome: Progressing     Problem: Pain - Standard  Goal: Alleviation of pain or a reduction in pain to the patient’s comfort goal  Outcome: Progressing   The patient is Stable - Low risk of patient condition declining or worsening    Shift Goals  Clinical Goals: Monitor VS and wean O2; eepositioned q2  Patient Goals: get well and go home  Family Goals: salbador    Progress made toward(s) clinical / shift goals:   )2 weaned to 1 lpm nc from 2 and tolerating fairly well, ambulated in the hallway and tolerated fairly well, gen weakness was noted, episodes of crying was also observed during interaction, encouraged to verbalized feelings and expressed anxiety and being depressed with health, medication was administered as per ordered, call light within reach  and bed alarm kept on,/50   Pulse 80   Temp 36.3 °C (97.3 °F) (Temporal)   Resp 15   Ht 1.626 m (5' 4\")   Wt 75 kg (165 lb 5.5 oz)   SpO2 95%         "

## 2024-04-10 NOTE — DISCHARGE PLANNING
HTH/SCP TCN chart review completed. Collaborated with ANTOINETTE Oconnor.   Per collaboration with ANTOINETTE, Home O2 eval will be completed. Current discharge considerations are for discharge home with Home Health, possible supplemental O2 and close outpatient f/u.  Per chart review, [facility] has accepted.   O2 FTF completed by MD. [facility] O[facility] pending.  Patient seen at bedside.  No new TCN concerns.  TCN will continue to follow and collaborate with discharge planning team as additional post acute needs arise. Thank you.    Completed:  PT/OT recommends HH on 4/8/24.    Choice obtained: HH ([facility]) & [facility] ([facility])  SCP with [facility].  Referred to schedulers for Follow up appointment.  F/u scheduled for 04.15.24 at 1:40 pm Dr Laird.

## 2024-04-10 NOTE — FACE TO FACE
"Face to Face Note  -  Durable Medical Equipment    Brian Harry M.D. - NPI: [id number]  I certify that this patient is under my care and that they had a durable medical equipment(DME)face to face encounter by myself that meets the physician DME face-to-face encounter requirements with this patient on:    Date of encounter:   Patient:                    MRN:                       YOB: 2026  Donte Alicea [facility]  [id number]  1940     The encounter with the patient was in whole, or in part, for the following medical condition, which is the primary reason for durable medical equipment:  CHF and Other - hypoxia    I certify that, based on my findings, the following durable medical equipment is medically necessary:    Oxygen   HOME O2 Saturation Measurements:(Values must be present for Home Oxygen orders)  Room air sat at rest: 85  Room air sat with amb: 83  With liters of O2: 2, O2 sat at rest with O2: 95  With Liters of O2: 3, O2 sat with amb with O2 : 93  Is the patient mobile?: Yes  If patient feels more short of breath, they can go up to 6 liters per minute and contact healthcare provider.    Supporting Symptoms: The patient requires supplemental oxygen, as the following interventions have been tried with limited or no improvement: \"Ambulation with oximetry and \"Incentive spirometry.    My Clinical findings support the need for the above equipment due to:  Hypoxia  "

## 2024-04-10 NOTE — CARE PLAN
The patient is Stable - Low risk of patient condition declining or worsening    Shift Goals  Clinical Goals: monitor VS and promote self care, wean oxygen as appropriate  Patient Goals: feel better  Family Goals: salbador    Progress made toward(s) clinical / shift goals:    Problem: Knowledge Deficit - Standard  Goal: Patient and family/care givers will demonstrate understanding of plan of care, disease process/condition, diagnostic tests and medications  Outcome: Progressing     Problem: Skin Integrity  Goal: Skin integrity is maintained or improved  Outcome: Progressing       Patient is not progressing towards the following goals:

## 2024-04-11 ENCOUNTER — APPOINTMENT (OUTPATIENT)
Dept: RADIOLOGY | Facility: MEDICAL CENTER | Age: 84
DRG: 189 | End: 2024-04-11
Attending: STUDENT IN AN ORGANIZED HEALTH CARE EDUCATION/TRAINING PROGRAM
Payer: MEDICARE

## 2024-04-11 LAB
ALBUMIN SERPL BCP-MCNC: 3.7 G/DL (ref 3.2–4.9)
ALBUMIN SERPL BCP-MCNC: 3.8 G/DL (ref 3.2–4.9)
ALBUMIN SERPL BCP-MCNC: 3.9 G/DL (ref 3.2–4.9)
BASOPHILS # BLD AUTO: 0.4 % (ref 0–1.8)
BASOPHILS # BLD: 0.04 K/UL (ref 0–0.12)
BUN SERPL-MCNC: 41 MG/DL (ref 8–22)
BUN SERPL-MCNC: 46 MG/DL (ref 8–22)
BUN SERPL-MCNC: 46 MG/DL (ref 8–22)
CALCIUM ALBUM COR SERPL-MCNC: 9.5 MG/DL (ref 8.5–10.5)
CALCIUM ALBUM COR SERPL-MCNC: 9.5 MG/DL (ref 8.5–10.5)
CALCIUM ALBUM COR SERPL-MCNC: 9.7 MG/DL (ref 8.5–10.5)
CALCIUM SERPL-MCNC: 9.3 MG/DL (ref 8.5–10.5)
CALCIUM SERPL-MCNC: 9.4 MG/DL (ref 8.5–10.5)
CALCIUM SERPL-MCNC: 9.5 MG/DL (ref 8.5–10.5)
CHLORIDE SERPL-SCNC: 98 MMOL/L (ref 96–112)
CO2 SERPL-SCNC: 22 MMOL/L (ref 20–33)
CO2 SERPL-SCNC: 25 MMOL/L (ref 20–33)
CO2 SERPL-SCNC: 26 MMOL/L (ref 20–33)
CREAT SERPL-MCNC: 1.62 MG/DL (ref 0.5–1.4)
CREAT SERPL-MCNC: 1.72 MG/DL (ref 0.5–1.4)
CREAT SERPL-MCNC: 1.79 MG/DL (ref 0.5–1.4)
EOSINOPHIL # BLD AUTO: 0.58 K/UL (ref 0–0.51)
EOSINOPHIL NFR BLD: 5.6 % (ref 0–6.9)
ERYTHROCYTE [DISTWIDTH] IN BLOOD BY AUTOMATED COUNT: 49 FL (ref 35.9–50)
GFR SERPLBLD CREATININE-BSD FMLA CKD-EPI: 27 ML/MIN/1.73 M 2
GFR SERPLBLD CREATININE-BSD FMLA CKD-EPI: 29 ML/MIN/1.73 M 2
GFR SERPLBLD CREATININE-BSD FMLA CKD-EPI: 31 ML/MIN/1.73 M 2
GLUCOSE SERPL-MCNC: 110 MG/DL (ref 65–99)
GLUCOSE SERPL-MCNC: 115 MG/DL (ref 65–99)
GLUCOSE SERPL-MCNC: 138 MG/DL (ref 65–99)
HCT VFR BLD AUTO: 40.2 % (ref 37–47)
HGB BLD-MCNC: 13.3 G/DL (ref 12–16)
IMM GRANULOCYTES # BLD AUTO: 0.07 K/UL (ref 0–0.11)
IMM GRANULOCYTES NFR BLD AUTO: 0.7 % (ref 0–0.9)
LYMPHOCYTES # BLD AUTO: 2.74 K/UL (ref 1–4.8)
LYMPHOCYTES NFR BLD: 26.4 % (ref 22–41)
MAGNESIUM SERPL-MCNC: 2 MG/DL (ref 1.5–2.5)
MCH RBC QN AUTO: 32.8 PG (ref 27–33)
MCHC RBC AUTO-ENTMCNC: 33.1 G/DL (ref 32.2–35.5)
MCV RBC AUTO: 99.3 FL (ref 81.4–97.8)
MONOCYTES # BLD AUTO: 0.96 K/UL (ref 0–0.85)
MONOCYTES NFR BLD AUTO: 9.2 % (ref 0–13.4)
NEUTROPHILS # BLD AUTO: 6 K/UL (ref 1.82–7.42)
NEUTROPHILS NFR BLD: 57.7 % (ref 44–72)
NRBC # BLD AUTO: 0 K/UL
NRBC BLD-RTO: 0 /100 WBC (ref 0–0.2)
PHOSPHATE SERPL-MCNC: 4.1 MG/DL (ref 2.5–4.5)
PHOSPHATE SERPL-MCNC: 4.3 MG/DL (ref 2.5–4.5)
PHOSPHATE SERPL-MCNC: 4.6 MG/DL (ref 2.5–4.5)
PLATELET # BLD AUTO: 274 K/UL (ref 164–446)
PMV BLD AUTO: 9.8 FL (ref 9–12.9)
POTASSIUM SERPL-SCNC: 3.9 MMOL/L (ref 3.6–5.5)
POTASSIUM SERPL-SCNC: 4.4 MMOL/L (ref 3.6–5.5)
POTASSIUM SERPL-SCNC: 4.6 MMOL/L (ref 3.6–5.5)
RBC # BLD AUTO: 4.05 M/UL (ref 4.2–5.4)
SODIUM SERPL-SCNC: 135 MMOL/L (ref 135–145)
SODIUM SERPL-SCNC: 136 MMOL/L (ref 135–145)
SODIUM SERPL-SCNC: 137 MMOL/L (ref 135–145)
WBC # BLD AUTO: 10.4 K/UL (ref 4.8–10.8)

## 2024-04-11 PROCEDURE — 94760 N-INVAS EAR/PLS OXIMETRY 1: CPT

## 2024-04-11 PROCEDURE — 80069 RENAL FUNCTION PANEL: CPT

## 2024-04-11 PROCEDURE — 700102 HCHG RX REV CODE 250 W/ 637 OVERRIDE(OP): Performed by: STUDENT IN AN ORGANIZED HEALTH CARE EDUCATION/TRAINING PROGRAM

## 2024-04-11 PROCEDURE — 700101 HCHG RX REV CODE 250: Performed by: STUDENT IN AN ORGANIZED HEALTH CARE EDUCATION/TRAINING PROGRAM

## 2024-04-11 PROCEDURE — A9270 NON-COVERED ITEM OR SERVICE: HCPCS | Performed by: STUDENT IN AN ORGANIZED HEALTH CARE EDUCATION/TRAINING PROGRAM

## 2024-04-11 PROCEDURE — 94640 AIRWAY INHALATION TREATMENT: CPT

## 2024-04-11 PROCEDURE — 700111 HCHG RX REV CODE 636 W/ 250 OVERRIDE (IP): Performed by: STUDENT IN AN ORGANIZED HEALTH CARE EDUCATION/TRAINING PROGRAM

## 2024-04-11 PROCEDURE — 94669 MECHANICAL CHEST WALL OSCILL: CPT

## 2024-04-11 PROCEDURE — 99233 SBSQ HOSP IP/OBS HIGH 50: CPT | Performed by: STUDENT IN AN ORGANIZED HEALTH CARE EDUCATION/TRAINING PROGRAM

## 2024-04-11 PROCEDURE — 700111 HCHG RX REV CODE 636 W/ 250 OVERRIDE (IP): Mod: JZ

## 2024-04-11 PROCEDURE — 85025 COMPLETE CBC W/AUTO DIFF WBC: CPT

## 2024-04-11 PROCEDURE — 770020 HCHG ROOM/CARE - TELE (206)

## 2024-04-11 PROCEDURE — 700102 HCHG RX REV CODE 250 W/ 637 OVERRIDE(OP)

## 2024-04-11 PROCEDURE — 36415 COLL VENOUS BLD VENIPUNCTURE: CPT

## 2024-04-11 PROCEDURE — A9270 NON-COVERED ITEM OR SERVICE: HCPCS

## 2024-04-11 PROCEDURE — 71045 X-RAY EXAM CHEST 1 VIEW: CPT

## 2024-04-11 PROCEDURE — 51798 US URINE CAPACITY MEASURE: CPT

## 2024-04-11 PROCEDURE — 83735 ASSAY OF MAGNESIUM: CPT

## 2024-04-11 PROCEDURE — 700105 HCHG RX REV CODE 258: Performed by: STUDENT IN AN ORGANIZED HEALTH CARE EDUCATION/TRAINING PROGRAM

## 2024-04-11 RX ORDER — SODIUM CHLORIDE, SODIUM LACTATE, POTASSIUM CHLORIDE, CALCIUM CHLORIDE 600; 310; 30; 20 MG/100ML; MG/100ML; MG/100ML; MG/100ML
INJECTION, SOLUTION INTRAVENOUS CONTINUOUS
Status: DISCONTINUED | OUTPATIENT
Start: 2024-04-11 | End: 2024-04-12 | Stop reason: HOSPADM

## 2024-04-11 RX ORDER — AZITHROMYCIN 250 MG/1
500 TABLET, FILM COATED ORAL DAILY
Status: DISCONTINUED | OUTPATIENT
Start: 2024-04-11 | End: 2024-04-12 | Stop reason: HOSPADM

## 2024-04-11 RX ORDER — SODIUM CHLORIDE, SODIUM LACTATE, POTASSIUM CHLORIDE, AND CALCIUM CHLORIDE .6; .31; .03; .02 G/100ML; G/100ML; G/100ML; G/100ML
500 INJECTION, SOLUTION INTRAVENOUS ONCE
Status: COMPLETED | OUTPATIENT
Start: 2024-04-11 | End: 2024-04-11

## 2024-04-11 RX ORDER — POTASSIUM CHLORIDE 20 MEQ/1
40 TABLET, EXTENDED RELEASE ORAL ONCE
Status: COMPLETED | OUTPATIENT
Start: 2024-04-11 | End: 2024-04-11

## 2024-04-11 RX ADMIN — DEXTROMETHORPHAN 60 MG: 30 SUSPENSION, EXTENDED RELEASE ORAL at 05:08

## 2024-04-11 RX ADMIN — IPRATROPIUM BROMIDE AND ALBUTEROL SULFATE 3 ML: 2.5; .5 SOLUTION RESPIRATORY (INHALATION) at 03:39

## 2024-04-11 RX ADMIN — ONDANSETRON 4 MG: 2 INJECTION INTRAMUSCULAR; INTRAVENOUS at 05:47

## 2024-04-11 RX ADMIN — SODIUM CHLORIDE, POTASSIUM CHLORIDE, SODIUM LACTATE AND CALCIUM CHLORIDE: 600; 310; 30; 20 INJECTION, SOLUTION INTRAVENOUS at 22:26

## 2024-04-11 RX ADMIN — LISINOPRIL 5 MG: 5 TABLET ORAL at 05:09

## 2024-04-11 RX ADMIN — SERTRALINE HYDROCHLORIDE 50 MG: 50 TABLET ORAL at 05:08

## 2024-04-11 RX ADMIN — VIBEGRON 75 MG: 75 TABLET, FILM COATED ORAL at 05:08

## 2024-04-11 RX ADMIN — NYSTATIN: 100000 POWDER TOPICAL at 18:12

## 2024-04-11 RX ADMIN — POTASSIUM CHLORIDE 40 MEQ: 1500 TABLET, EXTENDED RELEASE ORAL at 09:55

## 2024-04-11 RX ADMIN — IPRATROPIUM BROMIDE AND ALBUTEROL SULFATE 3 ML: 2.5; .5 SOLUTION RESPIRATORY (INHALATION) at 06:51

## 2024-04-11 RX ADMIN — ATENOLOL 50 MG: 50 TABLET ORAL at 18:10

## 2024-04-11 RX ADMIN — DEXTROMETHORPHAN 60 MG: 30 SUSPENSION, EXTENDED RELEASE ORAL at 18:10

## 2024-04-11 RX ADMIN — FUROSEMIDE 60 MG: 10 INJECTION INTRAMUSCULAR; INTRAVENOUS at 05:08

## 2024-04-11 RX ADMIN — AZITHROMYCIN DIHYDRATE 500 MG: 250 TABLET, FILM COATED ORAL at 22:28

## 2024-04-11 RX ADMIN — SPIRONOLACTONE 25 MG: 25 TABLET ORAL at 05:08

## 2024-04-11 RX ADMIN — SODIUM CHLORIDE, POTASSIUM CHLORIDE, SODIUM LACTATE AND CALCIUM CHLORIDE 500 ML: 600; 310; 30; 20 INJECTION, SOLUTION INTRAVENOUS at 09:52

## 2024-04-11 RX ADMIN — BENZONATATE 100 MG: 100 CAPSULE ORAL at 10:00

## 2024-04-11 RX ADMIN — NYSTATIN: 100000 POWDER TOPICAL at 05:08

## 2024-04-11 RX ADMIN — LEVOTHYROXINE SODIUM 50 MCG: 50 TABLET ORAL at 05:08

## 2024-04-11 ASSESSMENT — CHA2DS2 SCORE
SEX: FEMALE
AGE 75 OR GREATER: YES
CHF OR LEFT VENTRICULAR DYSFUNCTION: YES
VASCULAR DISEASE: YES
HYPERTENSION: YES
DIABETES: NO
PRIOR STROKE OR TIA OR THROMBOEMBOLISM: NO
CHA2DS2 VASC SCORE: 6
AGE 65 TO 74: NO

## 2024-04-11 ASSESSMENT — ENCOUNTER SYMPTOMS
SPUTUM PRODUCTION: 1
VOMITING: 0
COUGH: 1
SHORTNESS OF BREATH: 1
HEADACHES: 0
MYALGIAS: 0
NAUSEA: 0
ABDOMINAL PAIN: 0
CHILLS: 0
FEVER: 0
PALPITATIONS: 0
DIZZINESS: 0

## 2024-04-11 ASSESSMENT — PAIN DESCRIPTION - PAIN TYPE: TYPE: ACUTE PAIN

## 2024-04-11 ASSESSMENT — FIBROSIS 4 INDEX: FIB4 SCORE: 1.7

## 2024-04-11 NOTE — CARE PLAN
The patient is Stable - Low risk of patient condition declining or worsening    Shift Goals  Clinical Goals: wean 02, skin integrity, abx, diuresis  Patient Goals: discharge  Family Goals: updates and comfort    Progress made toward(s) clinical / shift goals:    Problem: Knowledge Deficit - Standard  Goal: Patient and family/care givers will demonstrate understanding of plan of care, disease process/condition, diagnostic tests and medications  Outcome: Progressing  Note: Pt educated on POC and disease processes. Pt verbalized understanding of medication regimen and interventions.     Problem: Skin Integrity  Goal: Skin integrity is maintained or improved  Outcome: Progressing  Note: Skin integrity monitored throughout the shift. Pressure ulcer preventions measures in place. TAPs, q2 turns with wedges, low airloss, foam pads for NC, and purerwick or moisture prevention.

## 2024-04-11 NOTE — DISCHARGE PLANNING
"TCN following. HTH/SCP chart reviewed. No new TCN needs identified. Plan is return to prior living arrangement with home health and DME ([facility]).  However, noted per PT note that recommending post acute placement if detention is not able to provide assist for all mobility as needed, however noted per PT note, \"Pt limited 2/2 hypotension today.\"      Completed:  PT recommends HH if [facility] can provide assist, if not then placement on 4/10  OT recommends HH on 4/8  Choice obtained: HH ([facility]) & DME ([facility])  SCP with [facility]CP.  PCP F/u scheduled 4/15.  "

## 2024-04-11 NOTE — RESPIRATORY CARE
COPD EDUCATION by COPD CLINICAL EDUCATOR  4/11/2024 at 1435 PM by Elvi Flores RRT     Patient reviewed by COPD education team. Patient does not have a history or diagnosis of COPD and is a non-smoker.  Therefore, patient does not qualify for the COPD program.

## 2024-04-11 NOTE — PROGRESS NOTES
Hospital Medicine Daily Progress Note    Date of Service  4/10/2024    Chief Complaint  Donte Magaña is a 83 y.o. female admitted 4/7/2024 with shortness of breath    Hospital Course  Donte Magaña is a 83 y.o. female who presented 4/7/2024 with persistent shortness of breath and cough.  Patient has a past medical history of atrial fibrillation on anticoagulation, hypertension, hypothyroidism.  She states that over the last week, she has had a persistent cough.  Patient denies any fevers, chills, nausea, vomiting.  She revisited the emergency department today for worsening shortness of breath.  Viral panel unremarkable.  She currently quires 2 L supplemental oxygen.  CT imaging was obtained showing multifocal pneumonia.  She was empirically started with IV antibiotics in the emergency department.  Patient will be admitted for acute hypoxic respiratory failure       Interval Problem Update  4/8  She is afebrile with normal vitals saturating 91 to 98% on 2 L nasal cannula.  CMP with mild hyperglycemia otherwise unremarkable, CBC MCV 98.2 otherwise unremarkable, blood cultures no growth to date, procalcitonin not elevated, BNP elevated, CRP minimally elevated 1.72.  CTA chest negative for PE has some bilateral groundglass opacities infection versus fluid, all infectious markers are negative.  TTE ordered showed normal LVEF and grade 2 diastolic dysfunction, moderate aortic insufficiency.  Will give a dose of Lasix.     4/9  Afebrile normal pulse and respiratory rate systolic blood pressure 90s to 110s pulse ox 90 to 96% on 1 L nasal cannula.  Increased diuresis today not particularly robust response to Lasix, low suspicion for pneumonia at this time, suspect HFpEF is causing her symptoms, monitor clinical course and restart antibiotics as indicated.  Feeling little weak.  Tolerating p.o. intake.    Above per previous hospitalist.    4/10/2024  Patient was seen and examined on the telemetry floor.  Continues to  be tearful at times.  Continues to have a productive cough.  She is alert and orient x 2.  Echocardiogram showing ejection fraction of 60%, grade 2 diastolic dysfunction.  Continues on furosemide 40 mg IV twice daily.  NT proBNP previously elevated at 1003.  Creatinine elevated at 1.70.  Suspect cardiorenal.  Currently on 2 LPM oxygen by nasal cannula.  Procalcitonin is within normal limits.  White count within normal limits.    I have discussed this patient's plan of care and discharge plan at IDT rounds today with Case Management, Nursing, Nursing leadership, and other members of the IDT team.    Consultants/Specialty      Code Status  Full Code    Disposition  The patient is not medically cleared for discharge to home or a post-acute facility.  Anticipate discharge to: home with organized home healthcare and close outpatient follow-up    I have placed the appropriate orders for post-discharge needs.    Review of Systems  Review of Systems   Constitutional:  Positive for malaise/fatigue. Negative for chills and fever.   Respiratory:  Positive for cough, sputum production and shortness of breath.    Cardiovascular:  Negative for chest pain and palpitations.   Gastrointestinal:  Negative for abdominal pain, nausea and vomiting.   Musculoskeletal:  Negative for joint pain and myalgias.   Neurological:  Negative for dizziness and headaches.        Physical Exam  Temp:  [36.3 °C (97.3 °F)-37 °C (98.6 °F)] 37 °C (98.6 °F)  Pulse:  [80-96] 81  Resp:  [15-18] 18  BP: ()/(42-53) 99/44  SpO2:  [93 %-96 %] 95 %    Physical Exam  Vitals and nursing note reviewed.   Constitutional:       Appearance: Normal appearance. She is normal weight. She is not ill-appearing or diaphoretic.      Interventions: Nasal cannula in place.   HENT:      Head: Normocephalic and atraumatic.      Mouth/Throat:      Mouth: Mucous membranes are moist.      Pharynx: Oropharynx is clear. No oropharyngeal exudate.   Eyes:      General:          Right eye: No discharge.         Left eye: No discharge.      Conjunctiva/sclera: Conjunctivae normal.      Pupils: Pupils are equal, round, and reactive to light.   Cardiovascular:      Rate and Rhythm: Normal rate and regular rhythm.      Pulses: Normal pulses.      Heart sounds: Normal heart sounds. No murmur heard.  Pulmonary:      Effort: Pulmonary effort is normal. No respiratory distress.      Breath sounds: Rhonchi and rales present.   Abdominal:      General: Abdomen is flat. Bowel sounds are normal. There is no distension.      Palpations: Abdomen is soft.      Tenderness: There is no abdominal tenderness.   Musculoskeletal:      Cervical back: Neck supple. No tenderness.      Right lower leg: No edema.      Left lower leg: No edema.   Skin:     Coloration: Skin is pale.   Neurological:      Mental Status: She is alert. She is disoriented.      Motor: No weakness.      Comments: Alert and orient x 2   Psychiatric:         Attention and Perception: Attention and perception normal.         Mood and Affect: Affect is tearful.         Behavior: Behavior normal. Behavior is cooperative.         Thought Content: Thought content normal.         Cognition and Memory: Cognition is impaired.         Fluids    Intake/Output Summary (Last 24 hours) at 4/10/2024 2304  Last data filed at 4/10/2024 0903  Gross per 24 hour   Intake 1000 ml   Output 200 ml   Net 800 ml       Laboratory  Recent Labs     04/08/24  0314 04/10/24  1100   WBC 9.3 8.5   RBC 4.50 4.21   HEMOGLOBIN 14.8 13.7   HEMATOCRIT 44.2 41.9   MCV 98.2* 99.5*   MCH 32.9 32.5   MCHC 33.5 32.7   RDW 49.1 48.8   PLATELETCT 256 286   MPV 10.0 9.9     Recent Labs     04/08/24  0314 04/10/24  1355   SODIUM 135 138   POTASSIUM 4.2 4.2   CHLORIDE 99 99   CO2 23 23   GLUCOSE 118* 120*   BUN 20 37*   CREATININE 0.72 1.78*   CALCIUM 9.4 9.4                   Imaging  DX-CHEST-PORTABLE (1 VIEW)   Final Result      1.  Minimal hazy opacity left base, likely atelectasis.       EC-ECHOCARDIOGRAM COMPLETE W/O CONT   Final Result      CT-CTA CHEST PULMONARY ARTERY W/ RECONS   Final Result         1. No CT evidence of pulmonary embolism.      2. Ill-defined groundglass opacities in the bilateral lungs, right more than left, likely multifocal pneumonia.      3. No pleural effusion. No pneumothorax.      DX-CHEST-PORTABLE (1 VIEW)   Final Result      No acute process.           Assessment/Plan  * Acute heart failure with preserved ejection fraction (HFpEF) (HCC)  Assessment & Plan  With no leukocytosis, procal negative x2, afebrile, elevated BNP I suspect her symptoms are more related to HFpEF than a pneumonia.  Diuresis   Monitor intake and output  Tele monitoring  Assess response to diuretics, monitor respiratory status and BP    04/10/24  Continue furosemide 40 mg IV twice daily  Continuous cardiac monitoring during forced diuresis to monitor for arrhythmias  Monitor electrolytes very closely  Strict ins and outs and daily weights.    Acute respiratory failure with hypoxia (HCC)- (present on admission)  Assessment & Plan  Patient currently requiring 2 L supplemental oxygen  CT imaging: Ill-defined groundglass opacities in the bilateral lungs, right more than left, likely multifocal pneumonia.   No leukocytosis.  Procalcitonin negative  Follow-up cultures  Continue with IV ceftriaxone and azithromycin    4/10/2024  Likely due to acute HFpEF.  Antibiotics have been discontinued.  On 2 LPM  I have ordered incentive spirometry and PEP therapy  Continuous pulse oximetry monitoring    Advanced care planning/counseling discussion- (present on admission)  Assessment & Plan  I spent 17 minutes at bedside in the emergency department with nursing staff present with patient discussing work-up, results, diagnosis, prognosis.  CODE STATUS discussed with patient and wants to be full code       Persistent atrial fibrillation (HCC)- (present on admission)  Assessment & Plan  Patient takes atenolol every  evening and Xarelto at home.  Continue with home medications  Followed by cardiology    Hypothyroidism- (present on admission)  Assessment & Plan  Continue synthroid     Pneumonia- (present on admission)  Assessment & Plan  Ill-defined groundglass opacities in the bilateral lungs, right more than left, likely multifocal pneumonia.   Continue with IV ceftriaxone and azithromycin    4/10/2024  Pneumonia has been ruled out         VTE prophylaxis:   SCDs/TEDs   therapeutic anticoagulation with xarelto 20 mg daily witih meals      I have performed a physical exam and reviewed and updated ROS and Plan today (4/10/2024). In review of yesterday's note (4/9/2024), there are no changes except as documented above.

## 2024-04-11 NOTE — PROGRESS NOTES
Assumed care of patient and received report from RN. Tele monitor in place and patient is PACED. VS stable. A&Ox4. Pain 0/10. POC discussed with patient and verbalized understanding. Call light in reach. Fall precautions in place. Bed locked in lowest position.     Fall Risk Score: MODERATE RISK  Fall risk interventions in place: Place yellow fall risk ID band on patient, Provide patient/family education based on risk assessment, Educate patient/family to call staff for assistance when getting out of bed, Place fall precaution signage outside patient door, Place patient in room close to nursing station, Utilize bed/chair fall alarm, and Bed alarm connected correctly  Bed type: Low air loss (Ashutosh Score less than 17 interventions in place)  Patient on cardiac monitor: Yes  IVF/IV medications: Not Applicable   Oxygen: How many liters 1L and Traced the line to wall oxygen  Bedside sitter: Not Applicable   Isolation: Not applicable

## 2024-04-11 NOTE — DIETARY
Nutrition Update:    Day 4 of admit.  Donte Magaña is a 83 y.o. female with admitting DX of Acute respiratory failure with hypoxia. Patient being followed to optimize nutrition.    Current Diet: Cardiac.   Per ADL, minimal PO documentation available, buy appears to be eating <25% for most meals, Pt did eat 50-75% of breakfast yesterday.   Pertinent Labs: BUN 46, Creat 1.72, Phos 4.6.   Pertinent Meds: Zofran PRN, Aldactone.   Pt with Acute HF.   Skin per wound care on 4/9: Sacrococcygeal area sDTI POA (Pt with discoloration and scar tissue to her sacrococcygeal area likely old pressure injury in combination with venous pooling and scar tissue); MASD to Breast and Groin folds (Appear to be nearly resolved), Bilateral heels (Red but blanching).  RD met with pt, pt reports minimal intake this am and last night's dinner. Pt reports no N/V/D/C, just poor appetite. Discussed benefits of supplement drinks to help optimize nutrition, pt agreeable to try Ensure Plus with meals.     Problem: Nutritional:  Goal: Achieve adequate nutritional intake  Description: Patient will consume ~ 50% or greater of meals  Outcome: Not Met    Plan/Rec:   Will add Ensure Plus with all meals. Pt prefers Strawberry or chocolate flavor.   Encourage PO of  ~ 50% or greater of meals.  RD to monitor labs, RD to adjust supplements to Nepro (lower phos formula) as needed.   RD following.

## 2024-04-12 ENCOUNTER — HOSPITAL ENCOUNTER (INPATIENT)
Facility: REHABILITATION | Age: 84
LOS: 11 days | DRG: 291 | End: 2024-04-23
Attending: PHYSICAL MEDICINE & REHABILITATION | Admitting: PHYSICAL MEDICINE & REHABILITATION
Payer: MEDICARE

## 2024-04-12 VITALS
RESPIRATION RATE: 16 BRPM | WEIGHT: 165.79 LBS | SYSTOLIC BLOOD PRESSURE: 132 MMHG | BODY MASS INDEX: 28.3 KG/M2 | HEART RATE: 79 BPM | TEMPERATURE: 98.1 F | DIASTOLIC BLOOD PRESSURE: 67 MMHG | HEIGHT: 64 IN | OXYGEN SATURATION: 98 %

## 2024-04-12 DIAGNOSIS — J47.1 BRONCHIECTASIS WITH ACUTE EXACERBATION (HCC): ICD-10-CM

## 2024-04-12 DIAGNOSIS — R79.89 AZOTEMIA: ICD-10-CM

## 2024-04-12 DIAGNOSIS — I73.9 PVD (PERIPHERAL VASCULAR DISEASE) (HCC): ICD-10-CM

## 2024-04-12 DIAGNOSIS — I10 ESSENTIAL HYPERTENSION: ICD-10-CM

## 2024-04-12 DIAGNOSIS — R32 URINARY INCONTINENCE, UNSPECIFIED TYPE: ICD-10-CM

## 2024-04-12 DIAGNOSIS — E03.8 OTHER SPECIFIED HYPOTHYROIDISM: ICD-10-CM

## 2024-04-12 DIAGNOSIS — I48.19 PERSISTENT ATRIAL FIBRILLATION (HCC): ICD-10-CM

## 2024-04-12 DIAGNOSIS — F32.A DEPRESSION, UNSPECIFIED DEPRESSION TYPE: ICD-10-CM

## 2024-04-12 DIAGNOSIS — R26.89 DECREASED MOBILITY: ICD-10-CM

## 2024-04-12 DIAGNOSIS — J47.1 ACUTE EXACERBATION OF BRONCHIECTASIS (HCC): ICD-10-CM

## 2024-04-12 DIAGNOSIS — F32.0 CURRENT MILD EPISODE OF MAJOR DEPRESSIVE DISORDER WITHOUT PRIOR EPISODE (HCC): ICD-10-CM

## 2024-04-12 PROBLEM — I50.31 ACUTE HEART FAILURE WITH PRESERVED EJECTION FRACTION (HFPEF) (HCC): Status: RESOLVED | Noted: 2024-04-10 | Resolved: 2024-04-12

## 2024-04-12 PROBLEM — J96.00 ACUTE RESPIRATORY FAILURE (HCC): Status: ACTIVE | Noted: 2024-04-12

## 2024-04-12 PROBLEM — I50.9 ACUTE HEART FAILURE (HCC): Status: RESOLVED | Noted: 2024-04-10 | Resolved: 2024-04-12

## 2024-04-12 PROBLEM — I50.33 ACUTE ON CHRONIC HEART FAILURE WITH PRESERVED EJECTION FRACTION (HFPEF) (HCC): Status: RESOLVED | Noted: 2024-04-12 | Resolved: 2024-04-12

## 2024-04-12 PROBLEM — I50.33 ACUTE ON CHRONIC HEART FAILURE WITH PRESERVED EJECTION FRACTION (HFPEF) (HCC): Status: ACTIVE | Noted: 2024-04-12

## 2024-04-12 PROBLEM — I50.9 ACUTE HEART FAILURE (HCC): Status: ACTIVE | Noted: 2024-04-10

## 2024-04-12 LAB
ALBUMIN SERPL BCP-MCNC: 3.6 G/DL (ref 3.2–4.9)
ALBUMIN/GLOB SERPL: 1 G/DL
ALP SERPL-CCNC: 75 U/L (ref 30–99)
ALT SERPL-CCNC: 12 U/L (ref 2–50)
ANION GAP SERPL CALC-SCNC: 9 MMOL/L (ref 7–16)
AST SERPL-CCNC: 14 U/L (ref 12–45)
BACTERIA BLD CULT: NORMAL
BACTERIA BLD CULT: NORMAL
BASOPHILS # BLD AUTO: 0.6 % (ref 0–1.8)
BASOPHILS # BLD: 0.06 K/UL (ref 0–0.12)
BILIRUB SERPL-MCNC: 0.5 MG/DL (ref 0.1–1.5)
BUN SERPL-MCNC: 45 MG/DL (ref 8–22)
CALCIUM ALBUM COR SERPL-MCNC: 9.9 MG/DL (ref 8.5–10.5)
CALCIUM SERPL-MCNC: 9.6 MG/DL (ref 8.5–10.5)
CHLORIDE SERPL-SCNC: 100 MMOL/L (ref 96–112)
CO2 SERPL-SCNC: 27 MMOL/L (ref 20–33)
CREAT SERPL-MCNC: 1.37 MG/DL (ref 0.5–1.4)
EOSINOPHIL # BLD AUTO: 0.42 K/UL (ref 0–0.51)
EOSINOPHIL NFR BLD: 4.1 % (ref 0–6.9)
ERYTHROCYTE [DISTWIDTH] IN BLOOD BY AUTOMATED COUNT: 51.3 FL (ref 35.9–50)
FLUAV RNA SPEC QL NAA+PROBE: NEGATIVE
FLUBV RNA SPEC QL NAA+PROBE: NEGATIVE
GFR SERPLBLD CREATININE-BSD FMLA CKD-EPI: 38 ML/MIN/1.73 M 2
GLOBULIN SER CALC-MCNC: 3.5 G/DL (ref 1.9–3.5)
GLUCOSE BLD STRIP.AUTO-MCNC: 144 MG/DL (ref 65–99)
GLUCOSE SERPL-MCNC: 121 MG/DL (ref 65–99)
HCT VFR BLD AUTO: 43.4 % (ref 37–47)
HGB BLD-MCNC: 13.7 G/DL (ref 12–16)
IMM GRANULOCYTES # BLD AUTO: 0.09 K/UL (ref 0–0.11)
IMM GRANULOCYTES NFR BLD AUTO: 0.9 % (ref 0–0.9)
LYMPHOCYTES # BLD AUTO: 1.93 K/UL (ref 1–4.8)
LYMPHOCYTES NFR BLD: 18.8 % (ref 22–41)
MAGNESIUM SERPL-MCNC: 2.1 MG/DL (ref 1.5–2.5)
MCH RBC QN AUTO: 32.2 PG (ref 27–33)
MCHC RBC AUTO-ENTMCNC: 31.6 G/DL (ref 32.2–35.5)
MCV RBC AUTO: 101.9 FL (ref 81.4–97.8)
MONOCYTES # BLD AUTO: 1.08 K/UL (ref 0–0.85)
MONOCYTES NFR BLD AUTO: 10.5 % (ref 0–13.4)
NEUTROPHILS # BLD AUTO: 6.66 K/UL (ref 1.82–7.42)
NEUTROPHILS NFR BLD: 65.1 % (ref 44–72)
NRBC # BLD AUTO: 0 K/UL
NRBC BLD-RTO: 0 /100 WBC (ref 0–0.2)
NT-PROBNP SERPL IA-MCNC: 622 PG/ML (ref 0–125)
PLATELET # BLD AUTO: 309 K/UL (ref 164–446)
PMV BLD AUTO: 9.9 FL (ref 9–12.9)
POTASSIUM SERPL-SCNC: 4.9 MMOL/L (ref 3.6–5.5)
PROCALCITONIN SERPL-MCNC: 0.13 NG/ML
PROT SERPL-MCNC: 7.1 G/DL (ref 6–8.2)
RBC # BLD AUTO: 4.26 M/UL (ref 4.2–5.4)
RSV RNA SPEC QL NAA+PROBE: NEGATIVE
SARS-COV-2 RNA RESP QL NAA+PROBE: NOTDETECTED
SIGNIFICANT IND 70042: NORMAL
SIGNIFICANT IND 70042: NORMAL
SITE SITE: NORMAL
SITE SITE: NORMAL
SODIUM SERPL-SCNC: 136 MMOL/L (ref 135–145)
SOURCE SOURCE: NORMAL
SOURCE SOURCE: NORMAL
SPECIMEN SOURCE: 1
WBC # BLD AUTO: 10.2 K/UL (ref 4.8–10.8)

## 2024-04-12 PROCEDURE — A9270 NON-COVERED ITEM OR SERVICE: HCPCS | Performed by: STUDENT IN AN ORGANIZED HEALTH CARE EDUCATION/TRAINING PROGRAM

## 2024-04-12 PROCEDURE — 770010 HCHG ROOM/CARE - REHAB SEMI PRIVAT*

## 2024-04-12 PROCEDURE — 97530 THERAPEUTIC ACTIVITIES: CPT

## 2024-04-12 PROCEDURE — 83880 ASSAY OF NATRIURETIC PEPTIDE: CPT

## 2024-04-12 PROCEDURE — 84145 PROCALCITONIN (PCT): CPT

## 2024-04-12 PROCEDURE — 700102 HCHG RX REV CODE 250 W/ 637 OVERRIDE(OP): Performed by: STUDENT IN AN ORGANIZED HEALTH CARE EDUCATION/TRAINING PROGRAM

## 2024-04-12 PROCEDURE — A9270 NON-COVERED ITEM OR SERVICE: HCPCS | Performed by: PHYSICAL MEDICINE & REHABILITATION

## 2024-04-12 PROCEDURE — 97535 SELF CARE MNGMENT TRAINING: CPT

## 2024-04-12 PROCEDURE — 36415 COLL VENOUS BLD VENIPUNCTURE: CPT

## 2024-04-12 PROCEDURE — 99239 HOSP IP/OBS DSCHRG MGMT >30: CPT | Performed by: STUDENT IN AN ORGANIZED HEALTH CARE EDUCATION/TRAINING PROGRAM

## 2024-04-12 PROCEDURE — 85025 COMPLETE CBC W/AUTO DIFF WBC: CPT

## 2024-04-12 PROCEDURE — 700102 HCHG RX REV CODE 250 W/ 637 OVERRIDE(OP): Performed by: PHYSICAL MEDICINE & REHABILITATION

## 2024-04-12 PROCEDURE — 99223 1ST HOSP IP/OBS HIGH 75: CPT | Performed by: PHYSICAL MEDICINE & REHABILITATION

## 2024-04-12 PROCEDURE — 0241U HCHG SARS-COV-2 COVID-19 NFCT DS RESP RNA 4 TRGT MIC: CPT

## 2024-04-12 PROCEDURE — 94760 N-INVAS EAR/PLS OXIMETRY 1: CPT

## 2024-04-12 PROCEDURE — 94669 MECHANICAL CHEST WALL OSCILL: CPT

## 2024-04-12 PROCEDURE — 307059 PAD,EAR PROTECTOR: Performed by: PHYSICAL MEDICINE & REHABILITATION

## 2024-04-12 PROCEDURE — 83735 ASSAY OF MAGNESIUM: CPT

## 2024-04-12 PROCEDURE — 82962 GLUCOSE BLOOD TEST: CPT

## 2024-04-12 PROCEDURE — 80053 COMPREHEN METABOLIC PANEL: CPT

## 2024-04-12 RX ORDER — PREDNISONE 20 MG/1
40 TABLET ORAL DAILY
Status: COMPLETED | OUTPATIENT
Start: 2024-04-13 | End: 2024-04-17

## 2024-04-12 RX ORDER — IPRATROPIUM BROMIDE AND ALBUTEROL SULFATE 2.5; .5 MG/3ML; MG/3ML
3 SOLUTION RESPIRATORY (INHALATION)
Status: CANCELLED | OUTPATIENT
Start: 2024-04-12

## 2024-04-12 RX ORDER — MIRTAZAPINE 15 MG/1
15 TABLET, FILM COATED ORAL
Status: ON HOLD
Start: 2024-04-12 | End: 2024-04-22

## 2024-04-12 RX ORDER — MIRTAZAPINE 15 MG/1
15 TABLET, FILM COATED ORAL
Status: CANCELLED | OUTPATIENT
Start: 2024-04-12

## 2024-04-12 RX ORDER — LEVOTHYROXINE SODIUM 0.05 MG/1
50 TABLET ORAL
Status: DISCONTINUED | OUTPATIENT
Start: 2024-04-13 | End: 2024-04-13

## 2024-04-12 RX ORDER — ALPRAZOLAM 0.25 MG/1
0.25 TABLET ORAL
Status: CANCELLED | OUTPATIENT
Start: 2024-04-12

## 2024-04-12 RX ORDER — BENZONATATE 100 MG/1
100 CAPSULE ORAL 3 TIMES DAILY PRN
Qty: 60 CAPSULE | Refills: 0 | Status: ON HOLD | OUTPATIENT
Start: 2024-04-12 | End: 2024-04-22

## 2024-04-12 RX ORDER — ATENOLOL 50 MG/1
50 TABLET ORAL EVERY EVENING
Status: CANCELLED | OUTPATIENT
Start: 2024-04-12

## 2024-04-12 RX ORDER — AZITHROMYCIN 250 MG/1
500 TABLET, FILM COATED ORAL DAILY
Status: CANCELLED | OUTPATIENT
Start: 2024-04-13 | End: 2024-04-15

## 2024-04-12 RX ORDER — DEXTROMETHORPHAN POLISTIREX 30 MG/5ML
60 SUSPENSION ORAL 2 TIMES DAILY
Status: DISCONTINUED | OUTPATIENT
Start: 2024-04-12 | End: 2024-04-18

## 2024-04-12 RX ORDER — AZITHROMYCIN 500 MG/1
500 TABLET, FILM COATED ORAL DAILY
Qty: 2 TABLET | Refills: 0 | Status: ON HOLD | OUTPATIENT
Start: 2024-04-13 | End: 2024-04-22

## 2024-04-12 RX ORDER — ECHINACEA PURPUREA EXTRACT 125 MG
2 TABLET ORAL PRN
Status: DISCONTINUED | OUTPATIENT
Start: 2024-04-12 | End: 2024-04-23 | Stop reason: HOSPADM

## 2024-04-12 RX ORDER — PREDNISONE 20 MG/1
40 TABLET ORAL DAILY
Status: ON HOLD
Start: 2024-04-12 | End: 2024-04-22

## 2024-04-12 RX ORDER — DEXTROMETHORPHAN POLISTIREX 30 MG/5ML
60 SUSPENSION ORAL 2 TIMES DAILY PRN
Qty: 148 ML | Refills: 0 | Status: ON HOLD | OUTPATIENT
Start: 2024-04-12 | End: 2024-04-22

## 2024-04-12 RX ORDER — PREDNISONE 20 MG/1
40 TABLET ORAL DAILY
Status: DISCONTINUED | OUTPATIENT
Start: 2024-04-12 | End: 2024-04-12

## 2024-04-12 RX ORDER — IPRATROPIUM BROMIDE AND ALBUTEROL SULFATE 2.5; .5 MG/3ML; MG/3ML
3 SOLUTION RESPIRATORY (INHALATION)
Status: DISCONTINUED | OUTPATIENT
Start: 2024-04-12 | End: 2024-04-23 | Stop reason: HOSPADM

## 2024-04-12 RX ORDER — SPIRONOLACTONE 25 MG/1
25 TABLET ORAL DAILY
Qty: 30 TABLET | Refills: 1 | Status: ON HOLD | OUTPATIENT
Start: 2024-04-15 | End: 2024-04-22

## 2024-04-12 RX ORDER — MIRTAZAPINE 15 MG/1
15 TABLET, FILM COATED ORAL
Status: DISCONTINUED | OUTPATIENT
Start: 2024-04-12 | End: 2024-04-12 | Stop reason: HOSPADM

## 2024-04-12 RX ORDER — NYSTATIN 100000 [USP'U]/G
POWDER TOPICAL 2 TIMES DAILY
Status: CANCELLED | OUTPATIENT
Start: 2024-04-12 | End: 2024-04-16

## 2024-04-12 RX ORDER — CARBOXYMETHYLCELLULOSE SODIUM 5 MG/ML
1 SOLUTION/ DROPS OPHTHALMIC PRN
Status: DISCONTINUED | OUTPATIENT
Start: 2024-04-12 | End: 2024-04-23 | Stop reason: HOSPADM

## 2024-04-12 RX ORDER — AZITHROMYCIN 250 MG/1
500 TABLET, FILM COATED ORAL DAILY
Status: COMPLETED | OUTPATIENT
Start: 2024-04-13 | End: 2024-04-14

## 2024-04-12 RX ORDER — IPRATROPIUM BROMIDE AND ALBUTEROL SULFATE 2.5; .5 MG/3ML; MG/3ML
3 SOLUTION RESPIRATORY (INHALATION)
Status: DISCONTINUED | OUTPATIENT
Start: 2024-04-12 | End: 2024-04-12 | Stop reason: HOSPADM

## 2024-04-12 RX ORDER — LEVOTHYROXINE SODIUM 0.05 MG/1
50 TABLET ORAL
Status: CANCELLED | OUTPATIENT
Start: 2024-04-13

## 2024-04-12 RX ORDER — ATENOLOL 25 MG/1
50 TABLET ORAL EVERY EVENING
Status: DISCONTINUED | OUTPATIENT
Start: 2024-04-12 | End: 2024-04-23 | Stop reason: HOSPADM

## 2024-04-12 RX ORDER — HYDRALAZINE HYDROCHLORIDE 25 MG/1
25 TABLET, FILM COATED ORAL EVERY 8 HOURS PRN
Status: DISCONTINUED | OUTPATIENT
Start: 2024-04-12 | End: 2024-04-23 | Stop reason: HOSPADM

## 2024-04-12 RX ORDER — BENZONATATE 100 MG/1
100 CAPSULE ORAL 3 TIMES DAILY PRN
Status: DISCONTINUED | OUTPATIENT
Start: 2024-04-12 | End: 2024-04-23 | Stop reason: HOSPADM

## 2024-04-12 RX ORDER — BENZONATATE 100 MG/1
100 CAPSULE ORAL 3 TIMES DAILY PRN
Status: CANCELLED | OUTPATIENT
Start: 2024-04-12

## 2024-04-12 RX ORDER — NYSTATIN 100000 [USP'U]/G
POWDER TOPICAL 2 TIMES DAILY
Status: DISCONTINUED | OUTPATIENT
Start: 2024-04-12 | End: 2024-04-15

## 2024-04-12 RX ORDER — ALPRAZOLAM 0.25 MG/1
0.25 TABLET ORAL
Status: DISCONTINUED | OUTPATIENT
Start: 2024-04-12 | End: 2024-04-23 | Stop reason: HOSPADM

## 2024-04-12 RX ORDER — ACETAMINOPHEN 500 MG
500 TABLET ORAL EVERY 6 HOURS PRN
Status: DISCONTINUED | OUTPATIENT
Start: 2024-04-12 | End: 2024-04-23 | Stop reason: HOSPADM

## 2024-04-12 RX ORDER — DEXTROMETHORPHAN POLISTIREX 30 MG/5ML
60 SUSPENSION ORAL 2 TIMES DAILY
Status: CANCELLED | OUTPATIENT
Start: 2024-04-12

## 2024-04-12 RX ORDER — MIRTAZAPINE 15 MG/1
15 TABLET, ORALLY DISINTEGRATING ORAL
Status: DISCONTINUED | OUTPATIENT
Start: 2024-04-12 | End: 2024-04-23 | Stop reason: HOSPADM

## 2024-04-12 RX ORDER — TRAZODONE HYDROCHLORIDE 50 MG/1
50 TABLET ORAL
Status: DISCONTINUED | OUTPATIENT
Start: 2024-04-12 | End: 2024-04-23 | Stop reason: HOSPADM

## 2024-04-12 RX ADMIN — DEXTROMETHORPHAN POLISTIREX 60 MG: 30 SUSPENSION ORAL at 20:24

## 2024-04-12 RX ADMIN — NYSTATIN: 100000 POWDER TOPICAL at 05:06

## 2024-04-12 RX ADMIN — FLUTICASONE FUROATE, UMECLIDINIUM BROMIDE AND VILANTEROL TRIFENATATE 1 PUFF: 100; 62.5; 25 POWDER RESPIRATORY (INHALATION) at 13:03

## 2024-04-12 RX ADMIN — ALPRAZOLAM 0.25 MG: 0.25 TABLET ORAL at 20:23

## 2024-04-12 RX ADMIN — MIRTAZAPINE 15 MG: 15 TABLET, ORALLY DISINTEGRATING ORAL at 20:23

## 2024-04-12 RX ADMIN — ATENOLOL 50 MG: 25 TABLET ORAL at 20:23

## 2024-04-12 RX ADMIN — VIBEGRON 75 MG: 75 TABLET, FILM COATED ORAL at 05:06

## 2024-04-12 RX ADMIN — LEVOTHYROXINE SODIUM 50 MCG: 50 TABLET ORAL at 05:06

## 2024-04-12 RX ADMIN — NYSTATIN: 100000 POWDER TOPICAL at 20:24

## 2024-04-12 RX ADMIN — RIVAROXABAN 15 MG: 15 TABLET, FILM COATED ORAL at 17:55

## 2024-04-12 RX ADMIN — SERTRALINE HYDROCHLORIDE 50 MG: 50 TABLET ORAL at 05:06

## 2024-04-12 RX ADMIN — DEXTROMETHORPHAN 60 MG: 30 SUSPENSION, EXTENDED RELEASE ORAL at 05:06

## 2024-04-12 ASSESSMENT — CHA2DS2 SCORE
HYPERTENSION: YES
CHA2DS2 VASC SCORE: 6
AGE 75 OR GREATER: YES
SEX: FEMALE
CHF OR LEFT VENTRICULAR DYSFUNCTION: YES
VASCULAR DISEASE: YES
PRIOR STROKE OR TIA OR THROMBOEMBOLISM: NO
DIABETES: NO
AGE 65 TO 74: NO

## 2024-04-12 ASSESSMENT — ENCOUNTER SYMPTOMS
EYES NEGATIVE: 1
NEUROLOGICAL NEGATIVE: 1
SPUTUM PRODUCTION: 1
WEIGHT LOSS: 0
WHEEZING: 1
MUSCULOSKELETAL NEGATIVE: 1
GASTROINTESTINAL NEGATIVE: 1
HEMOPTYSIS: 0
SHORTNESS OF BREATH: 0
CHILLS: 0
FEVER: 0
ORTHOPNEA: 0
COUGH: 1
PSYCHIATRIC NEGATIVE: 1
PALPITATIONS: 0

## 2024-04-12 ASSESSMENT — COGNITIVE AND FUNCTIONAL STATUS - GENERAL
WALKING IN HOSPITAL ROOM: A LITTLE
MOVING TO AND FROM BED TO CHAIR: A LITTLE
SUGGESTED CMS G CODE MODIFIER DAILY ACTIVITY: CK
DRESSING REGULAR UPPER BODY CLOTHING: A LITTLE
STANDING UP FROM CHAIR USING ARMS: A LITTLE
HELP NEEDED FOR BATHING: A LOT
TURNING FROM BACK TO SIDE WHILE IN FLAT BAD: A LITTLE
PERSONAL GROOMING: A LITTLE
DRESSING REGULAR LOWER BODY CLOTHING: A LOT
TOILETING: A LOT
MOBILITY SCORE: 17
CLIMB 3 TO 5 STEPS WITH RAILING: A LOT
DAILY ACTIVITIY SCORE: 16
SUGGESTED CMS G CODE MODIFIER MOBILITY: CK
MOVING FROM LYING ON BACK TO SITTING ON SIDE OF FLAT BED: A LITTLE

## 2024-04-12 ASSESSMENT — LIFESTYLE VARIABLES
ALCOHOL_USE: NO
AVERAGE NUMBER OF DAYS PER WEEK YOU HAVE A DRINK CONTAINING ALCOHOL: 0
TOTAL SCORE: 0
HAVE YOU EVER FELT YOU SHOULD CUT DOWN ON YOUR DRINKING: NO
EVER FELT BAD OR GUILTY ABOUT YOUR DRINKING: NO
TOTAL SCORE: 0
TOTAL SCORE: 0
EVER_SMOKED: NEVER
HAVE PEOPLE ANNOYED YOU BY CRITICIZING YOUR DRINKING: NO
EVER HAD A DRINK FIRST THING IN THE MORNING TO STEADY YOUR NERVES TO GET RID OF A HANGOVER: NO
HOW MANY TIMES IN THE PAST YEAR HAVE YOU HAD 5 OR MORE DRINKS IN A DAY: 0
ON A TYPICAL DAY WHEN YOU DRINK ALCOHOL HOW MANY DRINKS DO YOU HAVE: 0
DOES PATIENT WANT TO STOP DRINKING: NO
CONSUMPTION TOTAL: NEGATIVE

## 2024-04-12 ASSESSMENT — COPD QUESTIONNAIRES
HAVE YOU SMOKED AT LEAST 100 CIGARETTES IN YOUR ENTIRE LIFE: NO/DON'T KNOW
DURING THE PAST 4 WEEKS HOW MUCH DID YOU FEEL SHORT OF BREATH: NONE/LITTLE OF THE TIME
DO YOU EVER COUGH UP ANY MUCUS OR PHLEGM?: NO/ONLY WITH OCCASIONAL COLDS OR INFECTIONS
COPD SCREENING SCORE: 2

## 2024-04-12 ASSESSMENT — FIBROSIS 4 INDEX
FIB4 SCORE: 1.11
FIB4 SCORE: 1.7

## 2024-04-12 NOTE — DISCHARGE SUMMARY
Discharge Summary    CHIEF COMPLAINT ON ADMISSION  Chief Complaint   Patient presents with    Cough     Productive cough started approximately 1 hr ago per patient.     Shortness of Breath       Reason for Admission  Acute respiratory failure with hypoxia    Admission Date  4/7/2024    CODE STATUS  Full Code    HPI & HOSPITAL COURSE  Donte Magaña is a 83 y.o. female who presented 4/7/2024 with persistent shortness of breath and cough.  Patient has a past medical history of atrial fibrillation on anticoagulation, hypertension, hypothyroidism.  She was from a assisted living facility.  She stated that over the last week, she had a persistent cough.  Patient denied any fevers, chills, nausea, vomiting.  She revisited the emergency department today for worsening shortness of breath.  Viral panel was unremarkable.  She was requiring 2 L supplemental oxygen.  Baseline is on room air.  CT angiogram of the chest imaging was obtained showing multifocal pneumonia without pulmonary embolism..  She was empirically started with IV antibiotics in the emergency department.    Patient was admitted to the telemetry floor under the hospitalist service for acute respiratory failure with hypoxia.  Patient continued to require supplemental oxygen and was unable to wean off.  She continued to require 1 LPM oxygen by nasal cannula.  Procalcitonin was normal with elevated NT proBNP of 1079 concerning for congestive heart failure.  Echocardiogram showed ejection fraction of 60% with grade 2 diastolic dysfunction.  Due to concern for acute HFpEF, patient was started on IV diuresis.  However, her symptoms including productive cough and hypoxia continued to persist.  She developed a mild acute kidney injury, which improved with holding IV diuretics and giving a boluses of IV fluids.    Patient was noted to have some wheezing suggestive of reactive airway disease and was started on trilogy as well as DuoNebs.  Due to persistent hypoxia with  negative viral testing including influenza, RSV, and COVID-19 pulmonology was consulted, who felt that the patient had mild bronchiectasis with exacerbation that was postviral in nature.  Recommendation was for prednisone 40 mg daily for 5 days.  Also recommended outpatient pulmonary function testing after symptoms have resolved with follow-up with pulmonology.    Patient was reevaluated by occupational and physical therapy, who recommended postacute placement.  Patient was accepted to [facility].    Therefore, she is discharged in fair and stable condition to home with close outpatient follow-up.    The patient met 2-midnight criteria for an inpatient stay at the time of discharge.    Discharge Date  4/12/2024    FOLLOW UP ITEMS POST DISCHARGE  -As per [facility].  -Follow-up with outpatient pulmonology clinic with pulmonary function testing.    DISCHARGE DIAGNOSES  Principal Problem:    Acute respiratory failure with hypoxia (HCC) (POA: Yes)  Active Problems:    Acute exacerbation of bronchiectasis (HCC) (POA: Unknown)    Pneumonia (POA: Yes)    Hypothyroidism (POA: Yes)      Overview: Chronic condition, currently on levothyroxine 50 mcg daily, labs WNL    Persistent atrial fibrillation (HCC) (POA: Yes)      Overview: Chronic condition, follows with cardiology, currently on atenolol and       Xarelto    Advanced care planning/counseling discussion (POA: Yes)  Resolved Problems:    Acute diastolic heart failure (HCC) (POA: Yes)    Acute on chronic heart failure with preserved ejection fraction (HFpEF) (HCC) (POA: Unknown)      FOLLOW UP  Future Appointments   Date Time Provider Department Center   4/15/2024  1:40 PM Dmitry Christensen P.A.-[facility]   7/15/2024  4:20 PM Jolie Escobar M.D. [facility] Konrad Escobar M.D.  [address]  [address]  [facility] [address]  [phone]    Schedule an appointment as soon as possible for a visit in 1  week(s)  For hospitalization follow-up    Rosi Mendoza A.P.R.N.  [address] [id number] [phone]            MEDICATIONS ON DISCHARGE     Medication List        START taking these medications        Instructions   azithromycin 500 MG tablet  Start taking on: April 13, 2024  Commonly known as: Zithromax   Take 1 Tablet by mouth every day for 2 days.  Dose: 500 mg     benzonatate 100 MG Caps  Commonly known as: Tessalon   Take 1 Capsule by mouth 3 times a day as needed for Cough.  Dose: 100 mg     Dextromethorphan Polistirex ER 30 MG/5ML Suer  Commonly known as: Delsym   Take 10 mL by mouth 2 times a day as needed for Cough.  Dose: 60 mg     fluticasone-umeclidinium-vilanterol 100-62.5-25 mcg/act inhaler  Start taking on: April 13, 2024  Commonly known as: Trelegy Ellipta   Inhale 1 Puff every day.  Dose: 1 Puff     mirtazapine 15 MG Tabs  Commonly known as: Remeron   Take 1 Tablet by mouth at bedtime.  Dose: 15 mg     predniSONE 20 MG Tabs  Commonly known as: Deltasone   Take 2 Tablets by mouth every day for 5 days.  Dose: 40 mg     spironolactone 25 MG Tabs  Start taking on: April 15, 2024  Commonly known as: Aldactone   Take 1 Tablet by mouth every day.  Dose: 25 mg            CONTINUE taking these medications        Instructions   Acetaminophen 500 MG Caps   Take 2 Caps by mouth 3 times a day as needed. Indications: Pain  Dose: 2 Capsule     atenolol 50 MG Tabs  Commonly known as: Tenormin   Take 1 Tablet by mouth every evening.  Dose: 50 mg     diphenhydrAMINE 25 MG Tabs  Commonly known as: Benadryl   Take 50 mg by mouth at bedtime as needed for Sleep. Indications: sleep  Dose: 50 mg     DIPHENHYDRAMINE HCL (TOPICAL) 2 % Gel   Apply 1 Application topically 4 times a day as needed (itching). Indications: itching  Dose: 1 Application     Fluticasone Propionate (Inhal) 50 MCG/ACT Aepb   Administer 2 Sprays into affected nostril(S) every day at 6 PM. One spray in each nostril   Indications: allergies  Dose: 2 Spray     furosemide 40 MG Tabs  Commonly known as: Lasix   Take 40 mg by mouth 1 time a day as needed. Indications: Edema  Dose: 40 mg     HCA TRIPLE ANTIBIOTIC OINTMENT EX   Apply 1 Application topically 3 times a day as needed (For cuts and burns healing). Indications: scrapes and burns for healing  Dose: 1 Application     levothyroxine 50 MCG Tabs  Commonly known as: Synthroid   Take 1 Tablet by mouth every morning on an empty stomach. Indications: Underactive Thyroid  Dose: 50 mcg     Lidocaine 4 % Aero   Apply 1 Spray topically 3 times a day as needed (Pain). Indications: pain  Dose: 1 Spray     lisinopril 5 MG Tabs  Commonly known as: Prinivil   TAKE 1 TABLET BY MOUTH EVERY DAY  Dose: 5 mg     Lutein 20 MG Caps   Take 1 Capsule by mouth every day. take 40 mg  Dose: 1 Capsule     magnesium oxide 400 MG Tabs tablet  Commonly known as: Mag-Ox   Take 1 Tablet by mouth every day. Indications: Acid Indigestion  Dose: 400 mg     PREBIOTIC PRODUCT PO   Take 2 Tablets by mouth every day. Indications: Suppliment  Dose: 2 Tablet     rivaroxaban 20 MG Tabs tablet  Commonly known as: Xarelto   Take 1 Tablet by mouth with dinner.  Dose: 20 mg     SENOKOT PO   Take 1 Tablet by mouth 1 time a day as needed (constipation). 8.6 mg  Indications: constipation  Dose: 1 Tablet     sertraline 50 MG Tabs  Commonly known as: Zoloft   Take 1 Tablet by mouth every day. Indications: Major Depressive Disorder  Dose: 50 mg     trolamine salicylate 10 % cream  Commonly known as: Aspercreme Or Myoflex   Apply 1 Application topically 4 times a day as needed (Pain). APPLY LEFT HIP  Indications: pain  Dose: 1 Application     Tums 500 MG Chew  Generic drug: calcium carbonate   Chew 1,000 mg 3 times a day as needed (heartburn). Indications: Heartburn  Dose: 1,000 mg     vitamin D 2000 UNIT Tabs   Take 2,000 Units by mouth 2 times a day. Indications: supplement  Dose: 2,000 Units     Voltaren 1 % Gel  Generic drug:  "diclofenac sodium   Apply 2 g topically 2 times a day as needed (left hip pain). Indications: pain  Dose: 2 g            STOP taking these medications      Myrbetriq 50 MG Tb24  Generic drug: Mirabegron ER              Allergies  Allergies   Allergen Reactions    Amiodarone Hives     Throat and tongue itching    Bactrim Shortness of Breath    Cipro Xr Swelling    Metoprolol Swelling     Causes throat swelling    Morphine Unspecified     Hallucinations    Phytoplex Z-Guard [Petrolatum-Zinc Oxide] Unspecified     \"causes burning\"    Pseudoephedrine Palpitations    Qvar [Beclomethasone Dipropionate] Unspecified     Pressure on heart      Vibramycin Shortness of Breath    Atorvastatin Calcium-Polysorbate 80 Unspecified     Muscle aches      Augmentin Unspecified     Unknown reaction    Diltiazem Rash     rash    Flecainide Unspecified     dizziness    Keflex Unspecified     Pt states \"Unsure\".    Mucinex Unspecified     GI Distress      Tramadol Unspecified     crying    Atorvastatin Myalgia    Tape Rash     Paper tape okay       DIET  Orders Placed This Encounter   Procedures    Diet Order Diet: Cardiac     Standing Status:   Standing     Number of Occurrences:   1     Order Specific Question:   Diet:     Answer:   Cardiac [6]       ACTIVITY  As tolerated and directed by rehab.  Weight bearing as tolerated    CONSULTATIONS  Pulmonology    PROCEDURES  None    LABORATORY  Lab Results   Component Value Date    SODIUM 136 04/12/2024    POTASSIUM 4.9 04/12/2024    CHLORIDE 100 04/12/2024    CO2 27 04/12/2024    GLUCOSE 121 (H) 04/12/2024    BUN 45 (H) 04/12/2024    CREATININE 1.37 04/12/2024    CREATININE 0.78 07/29/2010    GLOMRATE >59 07/29/2010        Lab Results   Component Value Date    WBC 10.2 04/12/2024    WBC 9.3 07/29/2010    HEMOGLOBIN 13.7 04/12/2024    HEMATOCRIT 43.4 04/12/2024    PLATELETCT 309 04/12/2024        Total time of the discharge process 44 minutes.  "

## 2024-04-12 NOTE — CONSULTS
Pulmonary Consultation      Date of Service: 4/12/2024    Date of Admission:  4/7/2024  9:55 AM    Consulting Provider:  No att. providers found     Chief Complaint:  Cough (Productive cough started approximately 1 hr ago per patient. ) and Shortness of Breath    Reason for pulm consult:  Persistent productive cough, unclear etiology.     History of Present Illness/Hospital Course: Donte Magaña is a 83 y.o. female with a past medical history of Afib on anticoagulation, HTN, and Hypothyroidism, who presented to the ED on 4/7/2024 with complaints of productive cough and shortness of breath for a few days prior to admission.  She was admitted for acute hypoxic respiratory failure, initially suspected to be due to multifocal pneumonia with a component of CHF exacerbation.      CTA chest 4/7/2024 was negative for pulmonary embolism but showed ill-defined groundglass opacities in the bilateral lungs, worse on the right side, suspicious for multifocal pneumonia.  Echo 4/8 with normal LVSF, grade 2 diastolic dysfunction, mild mitral regurgitation, moderate AI.  Overall unchanged compared to 6/30/2023.  Repeat x-rays 4/10 and 4/12 with minimal hazy opacity in the left lung base.  On 1 L O2.  No leukocytosis.  Had TANIA which is improving.  proBNP 622.  Procalcitonin 0.13.  Flu COVID RSV negative. Procalcitonin neg x twice.   ABX: Ceftriaxone 4/7 to 4/9 x 3 days.  Azithromycin 4/7 to 4/9, and then 4/11.  No respiratory culture sent this admission.   Started on Trelegy by hospitalist 4/12 for possible underlying reactive airway disease.     Pulm consulted for persistent productive cough of unclear etiology. Pt denies any prior history of COPD, asthma, or bronchiectasis.  Dr. Strauss and I reviewed the CT chest images, there are areas of centrilobular bronchiectasis noted in addition to minimal groundglass opacities on the right side.    Review of Systems   Constitutional:  Positive for malaise/fatigue. Negative for chills,  fever and weight loss.   HENT: Negative.     Eyes: Negative.    Respiratory:  Positive for cough, sputum production and wheezing. Negative for hemoptysis and shortness of breath.    Cardiovascular:  Positive for leg swelling. Negative for chest pain, palpitations and orthopnea.   Gastrointestinal: Negative.    Genitourinary: Negative.    Musculoskeletal: Negative.    Neurological: Negative.    Psychiatric/Behavioral: Negative.     All other systems reviewed and are negative.      Home Medications       Reviewed by Maggie Borges (Pharmacy Tech) on 04/07/24 at 1117  Med List Status: Complete     Medication Last Dose Status   Acetaminophen 500 MG Cap 4/6/2024 Active   atenolol (TENORMIN) 50 MG Tab 4/6/2024 Active   calcium carbonate (TUMS) 500 MG Chew Tab 1 WEEK Active   Cholecalciferol (VITAMIN D) 2000 UNIT Tab 4/6/2024 Active   diclofenac sodium (VOLTAREN) 1 % Gel 2 WEEKS Active   diphenhydrAMINE (BENADRYL) 25 MG Tab 4/5/2024 Active   DIPHENHYDRAMINE HCL, TOPICAL, 2 % Gel 2 WEEKS Active   Fluticasone Propionate, Inhal, 50 MCG/ACT AEROSOL POWDER, BREATH ACTIVATED 4/5/2024 Active   furosemide (LASIX) 40 MG Tab 4/5/2024 Active   levothyroxine (SYNTHROID) 50 MCG Tab 4/6/2024 Active   Lidocaine 4 % Aerosol 4/6/2024 Active   lisinopril (PRINIVIL) 5 MG Tab 4/6/2024 Active   Lutein 20 MG Cap 4/5/2024 Active   magnesium oxide (MAG-OX) 400 MG Tab tablet 4/6/2024 Active   Mirabegron ER (MYRBETRIQ) 50 MG TABLET SR 24 HR 4/6/2024 Active   Neomycin-Bacitracin-Polymyxin (HCA TRIPLE ANTIBIOTIC OINTMENT EX) 4/6/2024 Active   PREBIOTIC PRODUCT PO 4/6/2024 Active   rivaroxaban (XARELTO) 20 MG Tab tablet 4/6/2024 Active   Sennosides (SENOKOT PO) 4/5/2024 Active   sertraline (ZOLOFT) 50 MG Tab 4/6/2024 Active   trolamine salicylate (ASPERCREME OR MYOFLEX) 10 % cream 4/6/2024 Active                    Social History     Tobacco Use    Smoking status: Never    Smokeless tobacco: Never   Vaping Use    Vaping Use: Never used  "  Substance Use Topics    Alcohol use: No    Drug use: No        Past Medical History:   Diagnosis Date    A-fib (HCC)     Anesthesia     \"Tachycardia for 5 days after cataract surgery\"    Anticoagulant long-term use 1/12/2012    Arthritis     osteo-Knees, hips    Atrial fibrillation (HCC)     Backpain     R hip    Bowel habit changes     diarrhea    Breath shortness     with exertion, prn O2 2L    Bronchitis Nov, 2013    CAD (coronary artery disease)     Depression     Glaucoma 5/3/2011    Hematoma complicating a procedure 11/3/2012    Hemorrhagic disorder ([facility])     bruising/coumadin    Hypertension     Hypothyroid     Lupus ([facility])     Macular degeneration     Menopause 1/12/2012    Mitral regurgitation 10/30/2012    Obesity 1/12/2012    Pacemaker 2018    Pneumonia feb,2013    Pre-syncope 6/29/2018    Pulmonary hypertension ([facility]) 10/30/2012    PVC's (premature ventricular contractions) 1/12/2012    Senile nuclear sclerosis     Spinal stenosis of lumbar region at multiple levels     Unspecified cataract     repaired bilateral    Unspecified urinary incontinence     Urinary bladder disorder        Past Surgical History:   Procedure Laterality Date    RI COMBINED ANT/POST COLPORRHAPHY  1/14/2020    Procedure: COLPORRHAPHY, COMBINED ANTEROPOSTERIOR - PERINEOPLASTY;  Surgeon: Waqas Robin M.D.;  Location: SURGERY SAME DAY [facility] ORS;  Service: Gynecology    RI LAP,DIAGNOSTIC ABDOMEN  1/14/2020    Procedure: PELVISCOPY;  Surgeon: Waqas Robin M.D.;  Location: SURGERY SAME DAY [facility] OR[facility];  Service: Gynecology    ENTEROCELE REPAIR  1/14/2020    Procedure: REPAIR, ENTEROCELE;  Surgeon: Waqas Robin M.D.;  Location: SURGERY SAME DAY [facility];  Service: Gynecology    BLADDER SLING FEMALE  1/14/2020    Procedure: BLADDER SLING, FEMALE - TOT;  Surgeon: Waqas Robin M.D.;  Location: SURGERY SAME DAY [facility];  Service: Gynecology    VAGINAL SUSPENSION  1/14/2020    Procedure: COLPOPEXY - SACROSPINOUS " VAULT SUSPENSION;  Surgeon: Waqas Robin M.D.;  Location: SURGERY SAME DAY [facility];  Service: Gynecology    SALPINGO OOPHORECTOMY Bilateral 1/14/2020    Procedure: SALPINGO-OOPHORECTOMY;  Surgeon: Waqas Robin M.D.;  Location: SURGERY SAME DAY [facility];  Service: Gynecology    IRRIGATION & DEBRIDEMENT ORTHO Right 2/14/2019    Procedure: IRRIGATION & DEBRIDEMENT ORTHO-HIP WOUND ;  Surgeon: Vladimir Lee M.D.;  Location: SURGERY [facility];  Service: Orthopedics    HIP ARTH ANTERIOR TOTAL Right 1/17/2019    Procedure: HIP ARTHROPLASTY ANTERIOR TOTAL;  Surgeon: Juan C Mercedes M.D.;  Location: SURGERY [facility];  Service: Orthopedics    PACEMAKER INSERTION  06/30/2018    Dual Chamber    KNEE ARTHROPLASTY TOTAL Right 6/23/2016    Procedure: KNEE ARTHROPLASTY TOTAL;  Surgeon: Heriberto Lozada M.D.;  Location: SURGERY [facility];  Service:     KNEE ARTHROPLASTY TOTAL Left 5/28/2015    Procedure: KNEE ARTHROPLASTY TOTAL;  Surgeon: Heriberto Lozada M.D.;  Location: SURGERY [facility];  Service:     CATARACT PHACO WITH IOL Right 5/5/2015    Procedure: IOL OD - STANDARD;  Surgeon: Dmitry Bejarano M.D.;  Location: [facility];  Service:     CATARACT PHACO WITH IOL  4/21/2015    Performed by Dmitry Bejarano M.D. at [facility]    RECOVERY  11/30/2010    Performed by SURGERY, CATH-RECOVERY at SURGERY SAME DAY [facility]    COLONOSCOPY  2008    Normal    [facility]    ABDOMINAL HYSTERECTOMY TOTAL  April 15,1975    still has ovaries    OPEN REDUCTION      left ankle    OTHER      Removed pins from left ankle    OTHER CARDIAC SURGERY  12/2017 and 07/19/2018     Cardiac Ablation    TONSILLECTOMY AND ADENOIDECTOMY         Allergies: Amiodarone, Bactrim, Cipro xr, Metoprolol, Morphine, Phytoplex z-guard [petrolatum-zinc oxide], Pseudoephedrine, Qvar [beclomethasone dipropionate], Vibramycin, Atorvastatin calcium-polysorbate 80, Augmentin, Diltiazem,  "Flecainide, Keflex, Mucinex, Tramadol, Atorvastatin, and Tape    Family History   Problem Relation Age of Onset    Stroke Mother     Diabetes Father     Stroke Sister     Heart Disease Brother     Stroke Sister     GI Disease Daughter         Crohn's Disease    Heart Disease Daughter         CHF    Other Daughter         Chronic Pain--Lymphedema    Cancer Paternal Aunt         breast       Pulmonary-Specific Vital Signs  Vitals  Blood Pressure : 132/67 (RN notified)  Temperature: 36.7 °C (98.1 °F)  Temp src: Temporal  Pulse: 79  Respiration: 16  Pulse Oximetry: 98 %  Height: 162.6 cm (5' 4.02\")  Weight: 75.2 kg (165 lb 12.6 oz)    Physical Examination  Physical Exam  Vitals and nursing note reviewed.   Constitutional:       General: She is not in acute distress.     Appearance: Normal appearance. She is not ill-appearing.   HENT:      Head: Normocephalic and atraumatic.      Mouth/Throat:      Mouth: Mucous membranes are moist.   Eyes:      General: No scleral icterus.     Extraocular Movements: Extraocular movements intact.      Pupils: Pupils are equal, round, and reactive to light.   Cardiovascular:      Rate and Rhythm: Normal rate and regular rhythm.      Pulses: Normal pulses.      Heart sounds: Normal heart sounds. No murmur heard.     No gallop.   Pulmonary:      Effort: Pulmonary effort is normal.      Breath sounds: Wheezing (Diffuse expiratory wheezing noted) present. No rhonchi or rales.   Neurological:      Mental Status: She is alert.             Intake/Output Summary (Last 24 hours) at 4/12/2024 1603  Last data filed at 4/11/2024 2000  Gross per 24 hour   Intake 480 ml   Output 500 ml   Net -20 ml       Recent Labs     04/10/24  1100 04/10/24  1355 04/11/24  0038 04/12/24  0828   WBC 8.5  --  10.4 10.2   NEUTSPOLYS 62.20  --  57.70 65.10   LYMPHOCYTES 23.20  --  26.40 18.80*   MONOCYTES 6.90  --  9.20 10.50   EOSINOPHILS 6.40  --  5.60 4.10   BASOPHILS 0.70  --  0.40 0.60   ASTSGOT  --  19  --  14 "   ALTSGPT  --  12  --  12   ALKPHOSPHAT  --  78  --  75   TBILIRUBIN  --  0.3  --  0.5     Recent Labs     04/10/24  1355 04/11/24  0038 04/11/24  1015 04/11/24  1429 04/12/24  0828   SODIUM 138 136 137 135 136   POTASSIUM 4.2 3.9 4.4 4.6 4.9   CHLORIDE 99 98 98 98 100   CO2 23 25 26 22 27   BUN 37* 46* 46* 41* 45*   CREATININE 1.78* 1.79* 1.72* 1.62* 1.37   MAGNESIUM 2.1 2.0  --   --  2.1   PHOSPHORUS 3.5 4.3 4.6* 4.1  --    CALCIUM 9.4 9.3 9.5 9.4 9.6     Recent Labs     04/10/24  1355 04/11/24  0038 04/11/24  1015 04/11/24  1429 04/12/24  0828   ALTSGPT 12  --   --   --  12   ASTSGOT 19  --   --   --  14   ALKPHOSPHAT 78  --   --   --  75   TBILIRUBIN 0.3  --   --   --  0.5   GLUCOSE 120*   < > 138* 115* 121*    < > = values in this interval not displayed.         DX-CHEST-PORTABLE (1 VIEW)   Final Result      There has been no significant interval change from the prior study.      DX-CHEST-PORTABLE (1 VIEW)   Final Result      1.  Minimal hazy opacity left base, likely atelectasis.      EC-ECHOCARDIOGRAM COMPLETE W/O CONT   Final Result      CT-CTA CHEST PULMONARY ARTERY W/ RECONS   Final Result         1. No CT evidence of pulmonary embolism.      2. Ill-defined groundglass opacities in the bilateral lungs, right more than left, likely multifocal pneumonia.      3. No pleural effusion. No pneumothorax.      DX-CHEST-PORTABLE (1 VIEW)   Final Result      No acute process.          Patient Active Problem List   Diagnosis    Personal history of systemic lupus erythematosus (SLE) (HCC)    Glaucoma    Macular degeneration    Pneumonia    Chondromalacia patellae of left knee    Spinal stenosis, multilevel    Chronic anticoagulation    Hypothyroidism    Coronary artery disease involving native coronary artery of native heart    Mild episode of recurrent major depressive disorder (HCC)    Degenerative arthritis of knee, bilateral    Cardiac pacemaker in situ    Persistent atrial fibrillation (HCC)    Multiple drug  allergies    Bilateral leg edema    Cysts of both ovaries    Urinary incontinence    Essential hypertension    Stage 2 chronic kidney disease    Class 1 obesity due to excess calories with serious comorbidity and body mass index (BMI) of 31.0 to 31.9 in adult    High risk medication use    Falls, initial encounter    PVD (peripheral vascular disease) ([facility])    Decreased mobility    Advanced care planning/counseling discussion    Psoas hematoma, left, secondary to anticoagulant therapy, subsequent encounter    Hypokalemia    Generalized weakness    Aortic valve regurgitation    Pressure injury of sacral region, stage 2 ([facility])    Acute respiratory failure with hypoxia (HCC)    Acute heart failure ([facility])    Acute respiratory failure ([facility])       Assessment and Recommendations:    #Acute hypoxic respiratory failure  #Bronchiectasis versus ? reactive airway disease/asthma    -Hypoxia nearly resolved, currently only on 1 L via nasal cannula.  Initially suspected to be due to multifocal pneumonia, treated briefly with antibiotics.  Was also diuresed but developed TANIA and diuresis held with subsequent improvement in renal function.  -On our review of the CT images, she had some centrilobular bronchiectasis bilaterally.  It is possible that her presenting symptoms were due to flareup of underlying bronchiectasis which could present Similar to reactive airway disease or she could have a primary reactive airway disease/asthma.  No PFTs on chart.  Not on inhalers at home.  -Started on Trelegy by hospitalist on 4/12.    Plan/recs  -In the absence of leukocytosis and procalcitonin, this is less likely to be an infectious pathology.  She has already been treated with antibiotics.  -Recommend 5 days of prednisone 40 mg daily and outpatient pulmonology follow-up with PFTs after symptoms have resolved.  -Pt is getting moved to inpatient rehab today, will follow up as outpatient    The patient was seen and plan discussed with my  attending, Dr. Strauss and hospitalist Dr. Harry.    Thank you for the consult.     Dr. Lucie Ascencio MD   [facility] IM PGY 2 Resident    Please refer to Dr. Strauss's attestation for additional comments/recommendations.

## 2024-04-12 NOTE — DISCHARGE PLANNING
[facility] Transitional Care Coordination    Dr. Mercedes recommending candidate for acute rehab.  Insurance auth for IRF pending.      [facility] PAS forwarded to Dr. Lisa Mercedes for review.      Will follow for auth determination.     1345 - Insurance has authorized inpatient rehab.  Auth #[id number].  Following for PMR attestation.    1410 - Call out daughter Renia with update [facility] has authorized inpatient rehab.  Reina reports she has spoken with [facility] Director, they will plan to meet with Tutor Key during Rehab admission to determine care needs prior to discharge.  Reviewed [facility] insurance for program to include $300.00/day copay to max $1200.00.  Reina voiced understanding.  Provided update with pending transport time 3/3:30p today.  Provided [facility] contact information, provided [facility] Nurses station contact information.      Dr. Lisa Mercedes will accept Donte to inpaitent rehab.  GMT transport 3/3:30p today.  Dr. Harry aware.  Nursing to call report to [id number]. Bedside RN Barrett patel.  Voalte update to ANTOINETTE Oconnor.  [facility] will follow to assist as needed with transition to [facility].

## 2024-04-12 NOTE — H&P
Physical Medicine & Rehabilitation  History and Physical (H&P)  &     Post Admission Physician Evaluation (UMANG)       Date of Admission: 4/12/2024   Date of Service: 4/12/2024   Donte Magaña  Room/bed 32/1    [facility] Code to Support Admission: 0009 - Cardiac    Etiologic Diagnosis: Acute heart failure (HCC)    _______________________________________________    Chief Complaint: Pneumonia and heart failure    History of Present Illness:    Donte Magaña is an 83-year-old female who presented to [facility] 4/7/2024 with persistent shortness of breath and cough.  She has medical history significant for atrial fibrillation on AC, hypertension, hypothyroidism.  She states that over the past week she has had a persistent cough.  She initially presented to the emergency room on 4/4 but Neel presented on 4/7.  Her viral panel was unremarkable.  She was requiring 2 L of supplemental oxygen.  CT showed multifocal pneumonia.  She was empirically started on IV antibiotics and was admitted for acute hypoxemic respiratory failure.  Procalcitonin was not elevated, blood cultures have shown no growth to date.  Her BNP was elevated.  Her CTA of the chest was negative for PE with only some bilateral groundglass opacities, all infectious markers negative.  TTE showed normal LVEF and grade 2 diastolic dysfunction with moderate aortic insufficiency.  She was started on Lasix.  Diuresis was ultimately increased and the suspicion for pneumonia being the cause of her symptoms was less likely though she was maintained on antibiotics.  Due to diuresis she sustained acute kidney injury due to hypovolemia and was given fluids, her Lasix, lisinopril, Aldactone have been held.  Azithromycin was started due to concern for atypical pneumonia.  Viral panel was ordered 4/11 and is negative.  Her renal function is improving, she does not have a white count.  BNP is improving.    Patient functioning below her baseline and deemed an appropriate  "candidate for acute rehab.  She resides at an [facility].  She will plan to have increased services on discharge per her daughter.  Patient admitted to acute rehab 4/12/2024.  On admission patient reports she is doing okay.  Her daughter, Reina, is present.  Discussed plan to start steroids per pulmonology.  These will start tomorrow.  She is amenable to plan of care.  Discussed CODE STATUS and she is full code.    Review of Systems:     14 point ROS reviewed and negative except as stated above.      Past Medical History:  Past Medical History:   Diagnosis Date    A-fib (HCC)     Anesthesia     \"Tachycardia for 5 days after cataract surgery\"    Anticoagulant long-term use 1/12/2012    Arthritis     osteo-Knees, hips    Atrial fibrillation (HCC)     Backpain     R hip    Bowel habit changes     diarrhea    Breath shortness     with exertion, prn O2 2L    Bronchitis Nov, 2013    CAD (coronary artery disease)     Depression     Glaucoma 5/3/2011    Hematoma complicating a procedure 11/3/2012    Hemorrhagic disorder (HCC)     bruising/coumadin    Hypertension     Hypothyroid     Lupus (HCC)     Macular degeneration     Menopause 1/12/2012    Mitral regurgitation 10/30/2012    Obesity 1/12/2012    Pacemaker 2018    Pneumonia feb,2013    Pre-syncope 6/29/2018    Pulmonary hypertension (HCC) 10/30/2012    PVC's (premature ventricular contractions) 1/12/2012    Senile nuclear sclerosis     Spinal stenosis of lumbar region at multiple levels     Unspecified cataract     repaired bilateral    Unspecified urinary incontinence     Urinary bladder disorder        Past Surgical History:  Past Surgical History:   Procedure Laterality Date    MO COMBINED ANT/POST COLPORRHAPHY  1/14/2020    Procedure: COLPORRHAPHY, COMBINED ANTEROPOSTERIOR - PERINEOPLASTY;  Surgeon: Waqas Robin M.D.;  Location: SURGERY SAME DAY [facility];  Service: Gynecology    MO LAP,DIAGNOSTIC ABDOMEN  1/14/2020    Procedure: PELVISCOPY;  Surgeon: Waqas GRANDA" VIKTORIA Robin;  Location: SURGERY SAME DAY [facility];  Service: Gynecology    ENTEROCELE REPAIR  1/14/2020    Procedure: REPAIR, ENTEROCELE;  Surgeon: Waqas Robin M.D.;  Location: SURGERY SAME DAY [facility];  Service: Gynecology    BLADDER SLING FEMALE  1/14/2020    Procedure: BLADDER SLING, FEMALE - TOT;  Surgeon: Waqas Robin M.D.;  Location: SURGERY SAME DAY [facility];  Service: Gynecology    VAGINAL SUSPENSION  1/14/2020    Procedure: COLPOPEXY - SACROSPINOUS VAULT SUSPENSION;  Surgeon: Waqas Robin M.D.;  Location: SURGERY SAME DAY [facility];  Service: Gynecology    SALPINGO OOPHORECTOMY Bilateral 1/14/2020    Procedure: SALPINGO-OOPHORECTOMY;  Surgeon: Waqas Robin M.D.;  Location: SURGERY SAME DAY [facility];  Service: Gynecology    IRRIGATION & DEBRIDEMENT ORTHO Right 2/14/2019    Procedure: IRRIGATION & DEBRIDEMENT ORTHO-HIP WOUND ;  Surgeon: Vladimir Lee M.D.;  Location: SURGERY [facility];  Service: Orthopedics    HIP ARTH ANTERIOR TOTAL Right 1/17/2019    Procedure: HIP ARTHROPLASTY ANTERIOR TOTAL;  Surgeon: Juan C Mercedes M.D.;  Location: SURGERY [facility];  Service: Orthopedics    PACEMAKER INSERTION  06/30/2018    Dual Chamber    KNEE ARTHROPLASTY TOTAL Right 6/23/2016    Procedure: KNEE ARTHROPLASTY TOTAL;  Surgeon: Heriberto Lozada M.D.;  Location: [facility];  Service:     KNEE ARTHROPLASTY TOTAL Left 5/28/2015    Procedure: KNEE ARTHROPLASTY TOTAL;  Surgeon: Heriberto Lozada M.D.;  Location: [facility];  Service:     CATARACT PHACO WITH IOL Right 5/5/2015    Procedure: IOL OD - STANDARD;  Surgeon: Dmitry Bejarano M.D.;  Location: [facility];  Service:     CATARACT PHACO WITH IOL  4/21/2015    Performed by Dmitry Bejarano M.D. at [facility]    RECOVERY  11/30/2010    Performed by SURGERY, CATH-RECOVERY at [facility] SAME DAY [facility]    COLONOSCOPY  2008    Normal    [facility]    ABDOMINAL  HYSTERECTOMY TOTAL  April 15,1975    still has ovaries    OPEN REDUCTION      left ankle    OTHER      Removed pins from left ankle    OTHER CARDIAC SURGERY  12/2017 and 07/19/2018     Cardiac Ablation    TONSILLECTOMY AND ADENOIDECTOMY         Family History:  Family History   Problem Relation Age of Onset    Stroke Mother     Diabetes Father     Stroke Sister     Heart Disease Brother     Stroke Sister     GI Disease Daughter         Crohn's Disease    Heart Disease Daughter         CHF    Other Daughter         Chronic Pain--Lymphedema    Cancer Paternal Aunt         breast       Medications:  Current Facility-Administered Medications   Medication Dose    Respiratory Therapy Consult      Pharmacy Consult Request ...Pain Management Review 1 Each  1 Each    hydrALAZINE (Apresoline) tablet 25 mg  25 mg    carboxymethylcellulose (Refresh Tears) 0.5 % ophthalmic drops 1 Drop  1 Drop    benzocaine-menthol (Cepacol) lozenge 1 Lozenge  1 Lozenge    traZODone (Desyrel) tablet 50 mg  50 mg    sodium chloride (Ocean) 0.65 % nasal spray 2 Spray  2 Spray    acetaminophen (Tylenol) tablet 500 mg  500 mg    atenolol (Tenormin) tablet 50 mg  50 mg    [START ON 4/13/2024] azithromycin (Zithromax) tablet 500 mg  500 mg    Dextromethorphan Polistirex ER (Delsym) 30 MG/5ML suspension 60 mg  60 mg    [START ON 4/13/2024] fluticasone-umeclidinium-vilanterol (Trelegy Ellipta) 100-62.5-25 mcg/act inhaler 1 Puff  1 Puff    [START ON 4/13/2024] levothyroxine (Synthroid) tablet 50 mcg  50 mcg    mirtazapine (Remeron) tablet 15 mg  15 mg    nystatin (Mycostatin) powder      rivaroxaban (Xarelto) tablet 15 mg  15 mg    [START ON 4/13/2024] sertraline (Zoloft) tablet 50 mg  50 mg    [START ON 4/13/2024] vibegron (Gemtesa) tablet 75 mg  75 mg    ALPRAZolam (Xanax) tablet 0.25 mg  0.25 mg    benzonatate (Tessalon) capsule 100 mg  100 mg    ipratropium-albuterol (DUONEB) nebulizer solution  3 mL    menthol (Halls) lozenge 1 Lozenge  1 Lozenge     [START ON 4/13/2024] predniSONE (Deltasone) tablet 40 mg  40 mg       Allergies:  Amiodarone, Bactrim, Cipro xr, Metoprolol, Morphine, Phytoplex z-guard [petrolatum-zinc oxide], Pseudoephedrine, Qvar [beclomethasone dipropionate], Vibramycin, Atorvastatin calcium-polysorbate 80, Augmentin, Diltiazem, Flecainide, Keflex, Mucinex, Tramadol, Atorvastatin, and Tape    Psychosocial History:  Housing / Facility: Assisted Living Residence, Independent Living Facility  Lives with - Patient's Self Care Capacity: Alone and Able to Care For Self  Equipment Owned: Front-Wheel Walker, Single Point Cane, Tub / Shower Seat, Grab Bar(s) In Tub / Shower, Grab Bar(s) By Toilet    Level of Function Prior to Disability:  Per Service  Housing / Facility: Assisted Living Residence, Independent Living Facility  Bathroom Set up: Walk In Shower, Shower Chair, Grab Bars  Equipment Owned: Front-Wheel Walker, Single Point Cane, Tub / Shower Seat, Grab Bar(s) In Tub / Shower, Grab Bar(s) By Toilet  Lives with - Patient's Self Care Capacity: Alone and Able to Care For Self  Bed Mobility: Independent  Transfer Status: Independent  Ambulation: Independent  Assistive Devices Used: Front-Wheel Walker    Self Feeding: Independent  Grooming / Hygiene: Independent  Bathing: Independent  Dressing: Independent  Toileting: Independent  Medication Management: Requires Assist  Laundry: Requires Assist  Kitchen Mobility: Requires Assist  Finances: Requires Assist  Home Management: Requires Assist  Shopping: Requires Assist  Prior Level Of Mobility: Independent With Device in Home  Driving / Transportation: Relatives / Others Provide Transportation  Prior Services: Intermittent Physical Support for ADL Per Service  Housing / Facility: Assisted Living Residence, Independent Living Facility  Occupation (Pre-Hospital Vocational): Retired Due To Age    Level of Function Prior to Admission to [facility]:  Gait Level Of Assist: Unable to  "Participate  Assistive Device: Front Wheel Walker  Distance (Feet): 15  Deviation: Increased Base Of Support, Bradykinetic, Decreased Heel Strike, Decreased Toe Off  Weight Bearing Status: no restrictions  Supine to Sit: Supervised  Sit to Supine: Supervised  Scooting: Supervised  Rolling: Supervised  Skilled Intervention: Verbal Cuing  Comments: HOB raised, use of rail  Sit to Stand: Contact Guard Assist (pt utilizing rocking, few unsuccessful attempts before completing full STS)  Bed, Chair, Wheelchair Transfer: Unable to Participate  Toilet Transfers: Contact Guard Assist  Skilled Intervention: Verbal Cuing, Tactile Cuing, Compensatory Strategies  Sitting in Chair: unable  Sitting Edge of Bed: 5-8 min  Standin min    Upper Body Dressing: Supervision  Lower Body Dressing: Contact Guard Assist  Toileting: Supervision    CURRENT LEVEL OF FUNCTION:   Same as level of function prior to admission to [facility]    Physical Examination:     VITAL SIGNS:   height is 1.626 m (5' 4\") and weight is 77.1 kg (170 lb 1 oz). Her oral temperature is 36.6 °C (97.9 °F). Her blood pressure is 114/50 and her pulse is 60. Her respiration is 16 and oxygen saturation is 96%.   GENERAL: No apparent distress  HEENT: Normocephalic/atraumatic and EOMI  CARDIAC: Regular rate and rhythm  LUNGS: Expiratory wheezing right upper lobe, crackles left lower lobe, otherwise good airflow upper lobes, more reduced in the lower lobes.  Using supplemental oxygen  ABDOMINAL: soft, nontender, and nondistended    EXTREMITIES: Has some swelling but only very minimally pitting to mid calf  NEURO:  Mental status:  A&Ox4 (person, place, date, situation) answers questions appropriately follows commands  Speech: fluent, no aphasia or dysarthria    Motor Exam Upper Extremities   ? Myotome R L   Shoulder Abduction C5 5 5   Elbow flexion C5 5 5   Wrist extension C6 5 5   Elbow extension C7 5 5   Finger flexion C8 5 5   Finger abduction T1 5 " 5     Motor Exam Lower Extremities  ? Myotome R L   Hip flexion L2 5 5   Knee extension L3 5 5   Ankle dorsiflexion L4 5 5   Toe extension L5 5 5   Ankle plantarflexion S1 5 5     Generally deconditioned, no deficits.    Radiology:  DX-CHEST-PORTABLE (1 VIEW)  Final Result     1.  Minimal hazy opacity left base, likely atelectasis.     EC-ECHOCARDIOGRAM COMPLETE W/O CONT  Final Result     CT-CTA CHEST PULMONARY ARTERY W/ RECONS  Final Result        1. No CT evidence of pulmonary embolism.     2. Ill-defined groundglass opacities in the bilateral lungs, right more than left, likely multifocal pneumonia.     3. No pleural effusion. No pneumothorax.     DX-CHEST-PORTABLE (1 VIEW)  Final Result     No acute process.     DX-CHEST-PORTABLE (1 VIEW)    (Results Pending)        Laboratory Values:  Recent Labs     04/11/24  1015 04/11/24  1429 04/12/24  0828   SODIUM 137 135 136   POTASSIUM 4.4 4.6 4.9   CHLORIDE 98 98 100   CO2 26 22 27   GLUCOSE 138* 115* 121*   BUN 46* 41* 45*   CREATININE 1.72* 1.62* 1.37   CALCIUM 9.5 9.4 9.6     Recent Labs     04/10/24  1100 04/11/24  0038 04/12/24  0828   WBC 8.5 10.4 10.2   RBC 4.21 4.05* 4.26   HEMOGLOBIN 13.7 13.3 13.7   HEMATOCRIT 41.9 40.2 43.4   MCV 99.5* 99.3* 101.9*   MCH 32.5 32.8 32.2   MCHC 32.7 33.1 31.6*   RDW 48.8 49.0 51.3*   PLATELETCT 286 274 309   MPV 9.9 9.8 9.9           Primary Rehabilitation Diagnosis:    This patient is a 83 y.o. female admitted for acute inpatient rehabilitation with Acute heart failure (HCC).    Impairments:   ADLs/IADLs  Mobility    Secondary Diagnosis/Medical Co-morbidities Affecting Function  Hypertension, hypotension, hypothyroidism, atrial fibrillation, debility, impaired ADLs, impaired mobility    Relevant Changes Since Preadmission Evaluation:    Status unchanged    The patient's rehabilitation potential is Good  The patient's medical prognosis is good    Rehabilitation Plan:   Discussion and Recommendations:   1. The patient requires  an acute inpatient rehabilitation program with a coordinated program of care at an intensity and frequency not available at a lower level of care. This recommendation is substantiated by the patient's medical physicians who recommend that the patient's intervention and assessment of medical issues needs to be done at an acute level of care for patient's safety and maximum outcome.   2. A coordinated program of care will be supplied by an interdisciplinary team of physical therapy, occupational therapy, rehab physician, rehab nursing, and, if needed, speech therapy and rehab psychology. Rehab team presents a patient-specific rehabilitation and education program concentrating on prevention of future problems related to accessibility, mobility, skin, bowel, bladder, sexuality, and psychosocial and medical/surgical problems.   3. Need for Rehabilitation Physician: The rehab physician will be evaluating the patient on a multi-weekly basis to help coordinate the program of care. The rehab physician communicates between medical physicians, therapists, and nurses to maximize the patient's potential outcome. Specific areas in which the rehab physician will be providing daily assessment include the following:   A. Assessing the patient's heart rate and blood pressure response (vitals monitoring) to activity and making adjustments in medications or conservative measures as needed.   B. The rehab physician will be assessing the frequency at which the program can be increased to allow the patient to reach optimal functional outcome.   C. The rehab physician will also provide assessments in daily skin care, especially in light of patient's impairments in mobility.   D. The rehab physician will provide special expertise in understanding how to work with functional impairment and recommend appropriate interventions, compensatory techniques, and education that will facilitate the patient's outcome.   4. Jo Ann CUMMINGS.   The rehab RN  will be working with patient to carry over in room mobility and activities of daily living when the patient is not in 3 hours of skilled therapy. Rehab nursing will be working in conjunction with rehab physician to address all the medical issues above and continue to assess laboratory work and discuss abnormalities with the treating physicians, assess vitals, and response to activity, and discuss and report abnormalities with the rehab physician. Rehab RN will also continue daily skin care, supervise bladder/bowel program, instruct in medication administration, and ensure patient safety.   5. Rehab Therapy: Therapies to treat at intensity and frequency of (may change after completion of evaluation by all therapeutic disciplines):       PT:  Physical therapy to address mobility, transfer, gait training and evaluation for adaptive equipment needs 1.5 hour/day at least 5 days/week for the duration of the ELOS (see below)       OT:  Occupational therapy to address ADLs, self-care, home management training, functional mobility/transfers and assistive device evaluation, and community re-integration 1.5 hour/day at least 5 days/week for the duration of the ELOS (see below).        ST/Dysphagia:  Speech therapy to address speech, language, and cognitive deficits as well as swallowing difficulties with retraining/dysphagia management and community re-integration with comprehension, expression, cognitive training 0 hour/day at least 5 days/week for the duration of the ELOS (see below).     Medical management / Rehabilitation Issues/ Adverse Potential as part of rehabilitation plan     Rehabilitation Issues/Adverse Potential  1.  Acute respiratory failure secondary to pneumonia: Patient demonstrates functional deficits in strength, balance, coordination, and ADL's. Patient is admitted to [facility] for comprehensive rehabilitation therapy as described below.   Rehabilitation nursing monitors bowel and  bladder control, educates on medication administration, co-morbidities and monitors patient safety.    2.  Neurostimulants: None at this time but continue to assess daily for need to initiate should status change.    3.  DVT prophylaxis:  Patient is on Xarelto for anticoagulation upon transfer. Encourage OOB. Monitor daily for signs and symptoms of DVT including but not limited to swelling and pain to prevent the development of DVT that may interfere with therapies.    4.  GI prophylaxis:  On prilosec to prevent gastritis/dyspepsia which may interfere with therapies.    5.  Pain:  Controlled with Tylenol    6.  Nutrition/Dysphagia: Dietician monitors nutrient intake, recommend supplements prn and provide nutrition education to pt/family to promote optimal nutrition for wound healing/recovery.     7.  Bladder/bowel:  Start bowel and bladder program as described below, to prevent constipation, urinary retention (which may lead to UTI), and urinary incontinence (which will impact upon pt's functional independence).   - TV Q3h while awake with post void bladder scans, I&O cath for PVRs >400  - up to commode after meal     8.  Skin/dermal ulcer prophylaxis: Monitor for new skin conditions with q.2 h. turns as required to prevent the development of skin breakdown.     9.  Cognition/Behavior: As needed psychologist provides adjustment counseling to illness and psychosocial barriers that may be potential barriers to rehabilitation.     10. Respiratory therapy: RT performs O2 management prn, breathing retraining, pulmonary hygiene and bronchospasm management prn to optimize participation in therapies.     Medical Co-Morbidities/Adverse Potential Affecting Function:    Acute heart failure with preserved ejection fraction  PT and OT for mobility and ADLs. Per guidelines, 15 hours per week between PT, OT and/or SLP.  Follow-up cardiology  BNP in the a.m.  Prior to admission: Lasix 60 mg IV twice daily, lisinopril 5 mg daily,  Aldactone 25 mg daily on HOLD due to hypotension from aggressive diuresis s/p gentle fluids  Hospitalist consult    Acute hypoxemic respiratory failure secondary to multifocal pneumonia seen on CT  Procalcitonin, WBC within normal limits.  Being treated with Zithromax through 4/14 for possible atypical pneumonia  RT to consult  Encourage IS  Pep therapy  DuoNeb as needed  Continue Delsym  Continue Trelegy Ellipta  Pulm recommendation just prior to rehab admission is for short course of prednisone 40 mg daily for 5 days.  Initiating 4/13.  Discussed with pulmonary, patient's unspecified allergy/reaction to beclomethasone nonissue with starting prednisone. Discussed with patient as well. Patient states has many issues to many medications. None known to prednisone. Continuing with prescription with patient and family permission as this recommendation per Pulm was discussed with patient's CAESAR Huang.     Hypertension  Hypotension  Prior to admission: Lasix 60 mg IV twice daily, lisinopril 5 mg daily, Aldactone 25 mg daily on HOLD due to hypotension from aggressive diuresis s/p gentle fluids    Atrial fibrillation  Continue Xarelto 15 mg daily  Continue atenolol 50 mg every evening    TANIA  Secondary to aggressive diuresis  Monitor labs    Hypothyroidism  Continue Synthroid 50 mcg each morning    Pain  Tylenol as needed    Skin  Patient at risk for skin breakdown due to debility in areas including sacrum, achilles, elbows and head in addition to other sites. Nursing to assess skin daily.     Poor appetite  Depression  Continue mirtazapine 15 mg nightly  Continue sertraline 50 mg daily    Bowel   Patient on Senna-docusate for constipation prophylaxis.     Bladder  TV/PVR/BS PRN  Continue vibegron 75 mg daily    Fungal/yeast within skin folds  Continue nystatin powder through 4/15      Upcoming Labs/imaging: Admission labs    DVT PROPHYLAXIS: Xarelto 15 mg daily    HOSPITALIST FOLLOWING: Yes consulted on admission    CODE  STATUS: Full code    DISPO: Home to [facility] versus [facility]    ERLINDA: TBD    ADDITIONAL MEDICAL NEEDS ON D/C (IV abx, O2, etc): TBD    MEDS SENT TO: TBD    DISCHARGE SPECIALIST FOLLOW UP: Cardiology    Patient to scheduled follow up with their PCP within 2 weeks from discharge from the [facility].     I personally performed a complete drug regimen review and no potential clinically significant medication issues were identified.     Goals/Expected Level of Function Based on Current Medical and Functional Status:  (may change based on patient's medical status and rate of impairment recovery)  Transfers:   Modified Independent  Mobility/Gait:   Modified Independent  ADL's:   Modified Independent    DISPOSITION: Discharge to pre-morbid independent living setting with the supportive care of patient's family and community resources.    ELOS: 7-10 days  ____________________________________    Dr. Lisa Mercedes DO, MS  [facility] - Physical Medicine & Rehabilitation   ____________________________________    Pt was seen today for 75 min, and entire time spent in face-to-face contact was >50% in counseling and coordination of care as detailed in A/P above.

## 2024-04-12 NOTE — PROGRESS NOTES
Called [employer]  [phone] for nurse to nurse report. Called did not go through., called back at [phone], left message to have Gela call back for nurse to nurse report.

## 2024-04-12 NOTE — DISCHARGE INSTRUCTIONS
Thank you for coming to [facility].  It has been my pleasure to take care of you!      -Please follow-up with your primary care provider in 3 to 5 days with labs.        HF Patient Discharge Instructions  Monitor your weight daily, and maintain a weight chart, to track your weight changes.   Activity as tolerated, unless your Doctor has ordered otherwise.  Follow a low fat, low cholesterol, low salt diet unless instructed otherwise by your Doctor. Read the labels on the back of food products and track your intake of fat, cholesterol and salt.   Fluid Restriction No. If a Fluid Restriction has been ordered by your Doctor, measure fluids with a measuring cup to ensure that you are not exceeding the restriction.   No smoking.  Oxygen Yes. If your Doctor has ordered that you wear Oxygen at home, it is important to wear it as ordered.  Did you receive an explanation from staff on the importance of taking each of your medications and why it is necessary to keep taking them unless your doctor says to stop? Yes  Were all of your questions answered about how to manage your heart failure and what to do if you have increased signs and symptoms after you go home? Yes  Do you feel like your heart failure care team involved you in the care treatment plan and allowed you to make decisions regarding your care while in the hospital and addressed any discharge needs you might have? Yes    See the educational handout provided at discharge for more information on monitoring your daily weight, activity and diet. This also explains more about Heart Failure, symptoms of a flare-up and some of the tests that you have undergone.     Warning Signs of a Flare-Up include:  Swelling in the ankles or lower legs.  Shortness of breath, while at rest, or while doing normal activities.   Shortness of breath at night when in bed, or coughing in bed.   Requiring more pillows to sleep at night, or needing to sit up at night to sleep.  Feeling weak, dizzy  or fatigued.     When to call your Doctor:  Call your Primary Care Physician or Cardiologist if:   You experience any pain radiating to your jaw or neck.  You have any difficulty breathing.  You experience weight gain of 3 lbs in a day or 5 lbs in a week.   You feel any palpitations or irregular heartbeats.  You become dizzy or lose consciousness.   If you have had an angiogram or had a pacemaker or AICD placed, and experience:  Bleeding, drainage or swelling at the surgical / puncture site.  Fever greater than 100.0 F  Shock from internal defibrillator.  Cool and / or numb extremities.     Please access the [facility] My HF Guide/Heart Failure Interactive Workbook:   [email or web address]

## 2024-04-12 NOTE — THERAPY
"Occupational Therapy  Daily Treatment     Patient Name: Donte Magaña  Age:  83 y.o., Sex:  female  Medical Record #: [id number]  Today's Date: 4/12/2024     Precautions  Precautions: Fall Risk  Comments: hypotensive    Assessment    Pt seen for follow up OT tx session, pt making slow progress with functional mobility and ADLs due to poor activity tolerance and decreased strength. Pt now requiring more assistance with all functional tasks, would benefit from continued skilled therapy while admitted as well as recommend post-acute placement.    Plan    Treatment Plan Status: (P) Continue Current Treatment Plan  Type of Treatment: Self Care / Activities of Daily Living, Adaptive Equipment, Manual Therapy Techniques, Neuro Re-Education / Balance, Therapeutic Exercises, Therapeutic Activity  Treatment Frequency: 3 Times per Week  Treatment Duration: Until Therapy Goals Met    DC Equipment Recommendations: (P) Unable to determine at this time  Discharge Recommendations: (P) Recommend post-acute placement for additional occupational therapy services prior to discharge home    Subjective    \"I agree, I dont think I can do it all right now\" in reference to PLOF/previous independence     Objective       04/12/24 1152   Precautions   Precautions Fall Risk   Cognition    Cognition / Consciousness X   Speech/ Communication Delayed Responses   Level of Consciousness Alert   Comments Lethargic, seeming to fall asleep seated on toilet, extensive time taken for all functional tasks   Strength Upper Body   Upper Body Strength  X   Gross Strength Generalized Weakness, Equal Bilaterally.    Balance   Sitting Balance (Static) Fair   Sitting Balance (Dynamic) Fair   Standing Balance (Static) Fair -   Standing Balance (Dynamic) Fair -   Weight Shift Sitting Fair   Weight Shift Standing Fair   Skilled Intervention Verbal Cuing;Facilitation   Comments w/ FWW   Bed Mobility    Sit to Supine Contact Guard Assist   Scooting Standby Assist "   Rolling Minimal Assist to Rt.;Minimum Assist to Lt.   Skilled Intervention Verbal Cuing;Facilitation   Activities of Daily Living   Grooming Contact Guard Assist;Seated  (due to activity tolerance)   Upper Body Dressing Supervision   Lower Body Dressing Moderate Assist   Toileting Maximal Assist   Skilled Intervention Verbal Cuing;Facilitation   How much help from another person does the patient currently need...   Putting on and taking off regular lower body clothing? 2   Bathing (including washing, rinsing, and drying)? 2   Toileting, which includes using a toilet, bedpan, or urinal? 2   Putting on and taking off regular upper body clothing? 3   Taking care of personal grooming such as brushing teeth? 3   Eating meals? 4   6 Clicks Daily Activity Score 16   Functional Mobility   Sit to Stand Contact Guard Assist   Bed, Chair, Wheelchair Transfer Contact Guard Assist   Toilet Transfers Minimal Assist   Transfer Method Stand Step   Mobility bathroom mobility, back to bed   Skilled Intervention Verbal Cuing;Facilitation   Comments w/ FWW   Activity Tolerance   Sitting in Chair 12 min  (toilet)   Standing 5 min   Short Term Goals   Short Term Goal # 1 Pt will complete ADL transfers with supervision   Goal Outcome # 1 Progressing slower than expected   Short Term Goal # 2 Pt will complete LB dressing with supervision   Goal Outcome # 2 Progressing slower than expected   Short Term Goal # 3 Pt will complete standing G/H with supervision   Goal Outcome # 3 Progressing slower than expected   Education Group   Education Provided Role of Occupational Therapist   Role of Occupational Therapist Patient Response Patient;Acceptance;Explanation   Occupational Therapy Treatment Plan    O.T. Treatment Plan Continue Current Treatment Plan   Anticipated Discharge Equipment and Recommendations   DC Equipment Recommendations Unable to determine at this time   Discharge Recommendations Recommend post-acute placement for additional  occupational therapy services prior to discharge home   Interdisciplinary Plan of Care Collaboration   IDT Collaboration with  Nursing;Physical Therapist  (received from PT in bathroom)   Patient Position at End of Therapy In Bed;Bed Alarm On;Call Light within Reach;Tray Table within Reach;Phone within Reach   Collaboration Comments RN updated

## 2024-04-12 NOTE — THERAPY
"Physical Therapy   Daily Treatment     Patient Name: Donte Magaña  Age:  83 y.o., Sex:  female  Medical Record #: [id number]  Today's Date: 4/12/2024     Precautions  Precautions: (P) Fall Risk    Assessment    Patient received in bed and agreeable to PT session. Pt required more assistance for mobility this session and appeared very lethargic, vitals WDL. Pt mobilized with CGA and FWW, extensive time taken for mobility and in bathroom before being left up with OT. Pt declined ambulating further than the bathroom. Recommend post acute placement. Will follow for acute care PT needs.     Plan    Treatment Plan Status: (P) Continue Current Treatment Plan  Type of Treatment: Bed Mobility, Gait Training, Neuro Re-Education / Balance, Self Care / Home Evaluation, Therapeutic Activities, Therapeutic Exercise  Treatment Frequency: 4 Times per Week  Treatment Duration: Until Therapy Goals Met    DC Equipment Recommendations: (P) Unable to determine at this time  Discharge Recommendations: (P) Recommend post-acute placement for additional physical therapy services prior to discharge home      Subjective    \"I feel weak today\".      Objective       04/12/24 1131   Precautions   Precautions Fall Risk   Vitals   O2 (LPM) 1   O2 Delivery Device Silicone Nasal Cannula   Pain 0 - 10 Group   Therapist Pain Assessment Post Activity Pain Same as Prior to Activity;Nurse Notified  (pain not rated)   Cognition    Cognition / Consciousness X   Speech/ Communication Delayed Responses   Level of Consciousness Alert   Comments pt appearing very lethargic, unable to maintain eyes open at times, reports fatigue   Active ROM Lower Body    Active ROM Lower Body  WDL   Strength Lower Body   Lower Body Strength  X   Gross Strength Generalized Weakness, Equal Bilaterally   Comments functional for shrt distance ambulation   Lower Body Muscle Tone   Lower Body Muscle Tone  WDL   Balance   Sitting Balance (Static) Fair   Sitting Balance (Dynamic) " Fair   Standing Balance (Static) Fair -   Standing Balance (Dynamic) Fair -   Weight Shift Sitting Fair   Weight Shift Standing Fair   Skilled Intervention Verbal Cuing;Compensatory Strategies   Comments w/FWW   Bed Mobility    Supine to Sit Contact Guard Assist   Scooting Supervised   Comments HOB slightly elevated   Gait Analysis   Gait Level Of Assist Contact Guard Assist   Assistive Device Front Wheel Walker   Distance (Feet) 15   # of Times Distance was Traveled 2   Deviation Increased Base Of Support;Bradykinetic;Decreased Heel Strike;Decreased Toe Off   Weight Bearing Status no restrictions   Skilled Intervention Verbal Cuing;Compensatory Strategies   Functional Mobility   Sit to Stand Contact Guard Assist   Mobility bed > bathroom > in bathroom with OT   Skilled Intervention Verbal Cuing   6 Clicks Assessment - How much HELP from from another person do you currently need... (If the patient hasn't done an activity recently, how much help from another person do you think he/she would need if he/she tried?)   Turning from your back to your side while in a flat bed without using bedrails? 3   Moving from lying on your back to sitting on the side of a flat bed without using bedrails? 3   Moving to and from a bed to a chair (including a wheelchair)? 3   Standing up from a chair using your arms (e.g., wheelchair, or bedside chair)? 3   Walking in hospital room? 3   Climbing 3-5 steps with a railing? 2   6 clicks Mobility Score 17   Short Term Goals    Short Term Goal # 1 Pt will be able to perform STS with FWW and SPV in 6 visits to progress functional transfers   Goal Outcome # 1 goal not met   Short Term Goal # 2 Pt will be able to ambulate 150ft with FWW and SPV in 6 visits to progress functional gait   Goal Outcome # 2 Goal not met   Education Group   Education Provided Role of Physical Therapist   Role of Physical Therapist Patient Response Patient;Acceptance;Explanation;Verbal Demonstration   Physical Therapy  Treatment Plan   Physical Therapy Treatment Plan Continue Current Treatment Plan   Anticipated Discharge Equipment and Recommendations   DC Equipment Recommendations Unable to determine at this time   Discharge Recommendations Recommend post-acute placement for additional physical therapy services prior to discharge home   Interdisciplinary Plan of Care Collaboration   IDT Collaboration with  Nursing;Occupational Therapist  (left pt with OT in bathroom)   Patient Position at End of Therapy Other (Comments)  (w/ OT in bathroom)   Collaboration Comments RN updated   Session Information   Date / Session Number  4/12 - 3 (3/4, 4/14)

## 2024-04-12 NOTE — PROGRESS NOTES
4 Eyes Skin Assessment Completed by DANNY Peng and DANNY Stearns.    Head WDL  Ears WDL  Nose WDL  Mouth WDL  Neck WDL  Breast/Chest WDL  Shoulder Blades WDL  Spine WDL  (R) Arm/Elbow/Hand Bruising  (L) Arm/Elbow/Hand WDL  Abdomen WDL  Groin Redness  Scrotum/Coccyx/Buttocks Redness, possible pressure ulcer  (R) Leg WDL  (L) Leg WDL  (R) Heel/Foot/Toe Redness and Rash, dry  (L) Heel/Foot/Toe Redness and Rash, dry          Devices In Places NC, PIV      Interventions In Place Waffle Overlay    Possible Skin Injury Yes    Pictures Uploaded Into Epic Yes  Wound Consult Placed Yes  RN Wound Prevention Protocol Ordered Yes

## 2024-04-12 NOTE — PROGRESS NOTES
Assumed care of patient at bedside report from day RN. Updated on POC. Patient currently A & O x 4; on 1 L O2 via NC; up max assist with no current complaints of acute pain. Assessment completed.  Call light within reach. Whiteboard updated. Fall precautions in place. Bed locked and in lowest position. All questions answered. No other needs indicated at this time.

## 2024-04-12 NOTE — DISCHARGE PLANNING
Case Management Discharge Planning    Admission Date: 4/7/2024  GMLOS: 3.6  ALOS: 5    6-Clicks ADL Score: 21  6-Clicks Mobility Score: 18      Anticipated Discharge Dispo: Discharge Disposition: D/T to home under [facility] care in anticipation of covered skilled care (06)  Discharge Address: [address] [place] [address]   [place] [address]  Discharge Contact Phone Number: [phone]    DME Needed: No    Action(s) Taken: Patient discussed in rounds, per bedside RN pt is being reevaluated by PT/OT due to pt requiring more assistance with mobility and appeared very lethargic.     PT/OT recommending post acute placement for patient. PMR consult placed per Dr. Harry.    Patient' daughter contacted by rehab TCN and obtained consent for patient to transfer to [facility].    Notified by [facility] TCN that patient will be picked up by [facility] today at 15:30 PM. Pt is agreeable with plan to discharge to [facility].     Escalations Completed: None    Medically Clear: No    Next Steps: [facility] will  patient at 15:30 PM.     Barriers to Discharge: Medical clearance and Pending Placement    Is the patient up for discharge tomorrow: No

## 2024-04-12 NOTE — CARE PLAN
The patient is Stable - Low risk of patient condition declining or worsening    Shift Goals  Clinical Goals: wean down O2, encourage food, rest  Patient Goals: go home  Family Goals: updates    Progress made toward(s) clinical / shift goals:    Problem: Knowledge Deficit - Standard  Goal: Patient and family/care givers will demonstrate understanding of plan of care, disease process/condition, diagnostic tests and medications  Description: Target End Date:  1-3 days or as soon as patient condition allows    Document in Patient Education    1.  Patient and family/caregiver oriented to unit, equipment, visitation policy and means for communicating concern  2.  Complete/review Learning Assessment  3.  Assess knowledge level of disease process/condition, treatment plan, diagnostic tests and medications  4.  Explain disease process/condition, treatment plan, diagnostic tests and medications  Outcome: Progressing     Problem: Fall Risk  Goal: Patient will remain free from falls  Description: Target End Date:  Prior to discharge or change in level of care    Document interventions on the [facility] Fall Risk Assessment    1.  Assess for fall risk factors  2.  Implement fall precautions  Outcome: Progressing     Problem: Pain - Standard  Goal: Alleviation of pain or a reduction in pain to the patient’s comfort goal  Description: Target End Date:  Prior to discharge or change in level of care    Document on Vitals flowsheet    1.  Document pain using the appropriate pain scale per order or unit policy  2.  Educate and implement non-pharmacologic comfort measures (i.e. relaxation, distraction, massage, cold/heat therapy, etc.)  3.  Pain management medications as ordered  4.  Reassess pain after pain med administration per policy  5.  If opiods administered assess patient's response to pain medication is appropriate per POSS sedation scale  6.  Follow pain management plan developed in collaboration with patient and  interdisciplinary team (including palliative care or pain specialists if applicable)  Outcome: Progressing       Patient is not progressing towards the following goals:

## 2024-04-12 NOTE — DISCHARGE PLANNING
PM&R referral from Dr. Harry  Cardiac/Pulmonary debility with ongoing medical management as well as therapy need. Spoke with daughter and POSUSI Huang about goals and expectation for IRF level of care. Discussed therapy plan for 3 hours per day 5 days a week. Discussed therapy schedules 30/45 or 60 minute sessions typically 1.5 hours between breakfast and lunch and another 1.5 hours between lunch and dinner. Discussed PT/OT and SLP roles. Discussed potential length of stay. Discussed comprehensive medical surveillance by physiatry as well as hospitalist team. Discussed patient to nursing ratio. Discussed goal to get [place] to a point that she is successful in returning to [facility] at [place]. Per Reina PLOF was independent with ADL's and used a mobility scooter to get around at the [facility]. [place] was transferring independently. Reina is meet with [facility] with the [facility] to look at additional support options. Reina is agreeable to building a case for IRF with [facility] and will reach back to share additional information based on her meeting today. TCN aware of PM&R referral. Physiatry consult per protocol.

## 2024-04-12 NOTE — PREADMISSION SCREENING NOTE
"  Pre-Admission Screening Form    Patient Information:   Name: Donte Magaña     MRN: [id number]       : 1940      Age: 83 y.o.   Gender: female      Race: White [7]       Marital Status:  [5]  Family Contact: Reina Taylor Ruth        Relationship: Daughter [2]  Grandchild [6]  Home Phone: [phone]             Cell Phone: [phone]  Advanced Directives: Copy in Chart  Code Status:  FULL  Current Attending Provider: Brian Harry M.D.  Referring Physician: Dr. Brian Harry      Physiatrist Consult: Dr. Lisa Mercedes       Referral Date: 2024  Primary Payor Source:  [facility]  Secondary Payor Source:      Medical Information:   Date of Admission to Acute Care [place]2024  Room Number: T810/00  Rehabilitation Diagnosis: 0009 - Cardiac and 0010.9 - Pulmonary Disorders: Other Pulmonary  Immunization History   Administered Date(s) Administered    INFLUENZA TIV (IM) 10/03/2010, 10/15/2011, 10/28/2012    Influenza (IM) Preservative Free - HISTORICAL DATA 2011    Influenza Seasonal Injectable - Historical Data 10/03/2010, 10/28/2012    Influenza Vaccine Adult HD 10/09/2014, 2015, 09/10/2016, 2017, 10/12/2018, 10/19/2019, 10/05/2020, 2022, 10/24/2022    Influenza Vaccine Quad Inj (Preserved) 10/08/2013    Pneumococcal Conjugate Vaccine (Prevnar/PCV-13) 2015    Pneumococcal polysaccharide vaccine (PPSV-23) 2002, 10/15/2009    TD Vaccine 10/16/2008    Tdap Vaccine 05/15/2012     Allergies   Allergen Reactions    Amiodarone Hives     Throat and tongue itching    Bactrim Shortness of Breath    Cipro Xr Swelling    Metoprolol Swelling     Causes throat swelling    Morphine Unspecified     Hallucinations    Phytoplex Z-Guard [Petrolatum-Zinc Oxide] Unspecified     \"causes burning\"    Pseudoephedrine Palpitations    Qvar [Beclomethasone Dipropionate] Unspecified     Pressure on heart      Vibramycin Shortness of Breath    " "Atorvastatin Calcium-Polysorbate 80 Unspecified     Muscle aches      Augmentin Unspecified     Unknown reaction    Diltiazem Rash     rash    Flecainide Unspecified     dizziness    Keflex Unspecified     Pt states \"Unsure\".    Mucinex Unspecified     GI Distress      Tramadol Unspecified     crying    Atorvastatin Myalgia    Tape Rash     Paper tape okay     Past Medical History:   Diagnosis Date    A-fib (HCC)     Anesthesia     \"Tachycardia for 5 days after cataract surgery\"    Anticoagulant long-term use 1/12/2012    Arthritis     osteo-Knees, hips    Atrial fibrillation (HCC)     Backpain     R hip    Bowel habit changes     diarrhea    Breath shortness     with exertion, prn O2 2L    Bronchitis Nov, 2013    CAD (coronary artery disease)     Depression     Glaucoma 5/3/2011    Hematoma complicating a procedure 11/3/2012    Hemorrhagic disorder (HCC)     bruising/coumadin    Hypertension     Hypothyroid     Lupus (HCC)     Macular degeneration     Menopause 1/12/2012    Mitral regurgitation 10/30/2012    Obesity 1/12/2012    Pacemaker 2018    Pneumonia feb,2013    Pre-syncope 6/29/2018    Pulmonary hypertension (HCC) 10/30/2012    PVC's (premature ventricular contractions) 1/12/2012    Senile nuclear sclerosis     Spinal stenosis of lumbar region at multiple levels     Unspecified cataract     repaired bilateral    Unspecified urinary incontinence     Urinary bladder disorder      Past Surgical History:   Procedure Laterality Date    WI COMBINED ANT/POST COLPORRHAPHY  1/14/2020    Procedure: COLPORRHAPHY, COMBINED ANTEROPOSTERIOR - PERINEOPLASTY;  Surgeon: Waqas Robin M.D.;  Location: SURGERY SAME DAY [facility];  Service: Gynecology    WI LAP,DIAGNOSTIC ABDOMEN  1/14/2020    Procedure: PELVISCOPY;  Surgeon: Waqas Robin M.D.;  Location: SURGERY SAME DAY [facility];  Service: Gynecology    ENTEROCELE REPAIR  1/14/2020    Procedure: REPAIR, ENTEROCELE;  Surgeon: Waqas Robin M.D.;  Location: " SURGERY SAME DAY [facility];  Service: Gynecology    BLADDER SLING FEMALE  1/14/2020    Procedure: BLADDER SLING, FEMALE - TOT;  Surgeon: Waqas Robin M.D.;  Location: SURGERY SAME DAY [facility];  Service: Gynecology    VAGINAL SUSPENSION  1/14/2020    Procedure: COLPOPEXY - SACROSPINOUS VAULT SUSPENSION;  Surgeon: Waqas Robin M.D.;  Location: SURGERY SAME DAY [facility];  Service: Gynecology    SALPINGO OOPHORECTOMY Bilateral 1/14/2020    Procedure: SALPINGO-OOPHORECTOMY;  Surgeon: Waqas Robin M.D.;  Location: SURGERY SAME DAY [facility];  Service: Gynecology    IRRIGATION & DEBRIDEMENT ORTHO Right 2/14/2019    Procedure: IRRIGATION & DEBRIDEMENT ORTHO-HIP WOUND ;  Surgeon: Vladimir Lee M.D.;  Location: SURGERY [facility];  Service: Orthopedics    HIP ARTH ANTERIOR TOTAL Right 1/17/2019    Procedure: HIP ARTHROPLASTY ANTERIOR TOTAL;  Surgeon: Juan C Mercedes M.D.;  Location: SURGERY [facility];  Service: Orthopedics    PACEMAKER INSERTION  06/30/2018    Dual Chamber    KNEE ARTHROPLASTY TOTAL Right 6/23/2016    Procedure: KNEE ARTHROPLASTY TOTAL;  Surgeon: Heriberto Lozada M.D.;  Location: SURGERY [facility];  Service:     KNEE ARTHROPLASTY TOTAL Left 5/28/2015    Procedure: KNEE ARTHROPLASTY TOTAL;  Surgeon: Heriberto Lozada M.D.;  Location: SURGERY [facility];  Service:     CATARACT PHACO WITH IOL Right 5/5/2015    Procedure: IOL OD - STANDARD;  Surgeon: Dmitry Bejarano M.D.;  Location: [facility];  Service:     CATARACT PHACO WITH IOL  4/21/2015    Performed by Dmitry Bejarano M.D. at [facility]    RECOVERY  11/30/2010    Performed by SURGERY, CATH-RECOVERY at [facility] SAME DAY [facility]    COLONOSCOPY  2008    Normal    [facility]    ABDOMINAL HYSTERECTOMY TOTAL  April 15,1975    still has ovaries    OPEN REDUCTION      left ankle    OTHER      Removed pins from left ankle    OTHER CARDIAC SURGERY  12/2017 and 07/19/2018      Cardiac Ablation    TONSILLECTOMY AND ADENOIDECTOMY         History Leading to Admission, Conditions that Caused the Need for Rehab (CMS):     Mike Camarillo M.D.  Physician  [facility]     H&P     Signed     Date of Service: 4/7/2024  4:51 PM      Hospital Medicine History & Physical Note     Date of Service  4/7/2024     Primary Care Physician  Jolie Escobar M.D.           Code Status  Full Code     Chief Complaint    Chief Complaint  Patient presents with   Cough      Productive cough started approximately 1 hr ago per patient.    Shortness of Breath        History of Presenting Illness  Donte Magaña is a 83 y.o. female who presented 4/7/2024 with persistent shortness of breath and cough.  Patient has a past medical history of atrial fibrillation on anticoagulation, hypertension, hypothyroidism.  She states that over the last week, she has had a persistent cough.  Patient denies any fevers, chills, nausea, vomiting.  She revisited the emergency department today for worsening shortness of breath.  Viral panel unremarkable.  She currently quires 2 L supplemental oxygen.  CT imaging was obtained showing multifocal pneumonia.  She was empirically started with IV antibiotics in the emergency department.  Patient will be admitted for acute hypoxic respiratory failure     I discussed the plan of care with patient.     Review of Systems  Review of Systems   Constitutional:  Negative for chills and fever.   HENT:  Negative for congestion, ear discharge, ear pain, nosebleeds and sinus pain.    Eyes:  Negative for double vision, photophobia, pain and discharge.   Respiratory:  Positive for cough and shortness of breath. Negative for hemoptysis and sputum production.    Cardiovascular:  Negative for chest pain, palpitations and orthopnea.   Gastrointestinal:  Negative for abdominal pain, diarrhea, nausea and vomiting.   Genitourinary:  Negative for frequency, hematuria and urgency.   Musculoskeletal:   Negative for back pain and neck pain.   Neurological:  Negative for dizziness and headaches.   Psychiatric/Behavioral:  Negative for hallucinations and substance abuse. The patient is not nervous/anxious and does not have insomnia.          Past Medical History   has a past medical history of A-fib ([facility]), Anesthesia, Anticoagulant long-term use (1/12/2012), Arthritis, Atrial fibrillation ([facility]), Backpain, Bowel habit changes, Breath shortness, Bronchitis (Nov, 2013), CAD (coronary artery disease), Depression, Glaucoma (5/3/2011), Hematoma complicating a procedure (11/3/2012), Hemorrhagic disorder ([facility]), Hypertension, Hypothyroid, Lupus ([facility]), Macular degeneration, Menopause (1/12/2012), Mitral regurgitation (10/30/2012), Obesity (1/12/2012), Pacemaker (2018), Pneumonia (feb,2013), Pre-syncope (6/29/2018), Pulmonary hypertension ([facility]) (10/30/2012), PVC's (premature ventricular contractions) (1/12/2012), Senile nuclear sclerosis, Spinal stenosis of lumbar region at multiple levels, Unspecified cataract, Unspecified urinary incontinence, and Urinary bladder disorder.     Surgical History   has a past surgical history that includes tonsillectomy and adenoidectomy; recovery (11/30/2010); colonoscopy (2008); abdominal hysterectomy total (April 15,1975); other; cataract phaco with iol (4/21/2015); cataract phaco with iol (Right, 5/5/2015); pacemaker insertion (06/30/2018); open reduction; other cardiac surgery (12/2017 and 07/19/2018 ); hip arth anterior total (Right, 1/17/2019); irrigation & debridement ortho (Right, 2/14/2019); pr combined ant/post colporrhaphy (1/14/2020); pr lap,diagnostic abdomen (1/14/2020); enterocele repair (1/14/2020); bladder sling female (1/14/2020); vaginal suspension (1/14/2020); salpingo oophorectomy (Bilateral, 1/14/2020); knee arthroplasty total (Left, 5/28/2015); and knee arthroplasty total (Right, 6/23/2016).      Family History  family history includes Cancer in her paternal aunt;  "Diabetes in her father; GI Disease in her daughter; Heart Disease in her brother and daughter; Other in her daughter; Stroke in her mother, sister, and sister.   Family history reviewed with patient. There is no family history that is pertinent to the chief complaint.      Social History   reports that she has never smoked. She has never used smokeless tobacco. She reports that she does not drink alcohol and does not use drugs.     Allergies    Allergies  Allergen Reactions   Amiodarone Hives      Throat and tongue itching   Bactrim Shortness of Breath   Cipro Xr Swelling   Metoprolol Swelling      Causes throat swelling   Morphine Unspecified      Hallucinations   Phytoplex Z-Guard [Petrolatum-Zinc Oxide] Unspecified      \"causes burning\"   Pseudoephedrine Palpitations   Qvar [Beclomethasone Dipropionate] Unspecified      Pressure on heart      Vibramycin Shortness of Breath   Atorvastatin Calcium-Polysorbate 80 Unspecified      Muscle aches      Augmentin Unspecified      Unknown reaction   Diltiazem Rash      rash   Flecainide Unspecified      dizziness   Keflex Unspecified      Pt states \"Unsure\".   Mucinex Unspecified      GI Distress      Tramadol Unspecified      crying   Atorvastatin Myalgia   Tape Rash      Paper tape okay      Assessment/Plan:  Justification for Admission Status  I anticipate this patient will require at least two midnights for appropriate medical management, necessitating inpatient admission because acute hypoxic respiratory failure     Patient will need a Telemetry bed on MEDICAL service .  The need is secondary to acute hypoxic respiratory failure.     * Acute respiratory failure with hypoxia (HCC)- (present on admission)  Assessment & Plan  Patient currently requiring 2 L supplemental oxygen  CT imaging: Ill-defined groundglass opacities in the bilateral lungs, right more than left, likely multifocal pneumonia.   No leukocytosis.  Procalcitonin negative  Follow-up cultures  Continue " with IV ceftriaxone and azithromycin     Advanced care planning/counseling discussion- (present on admission)  Assessment & Plan  I spent 17 minutes at bedside in the emergency department with nursing staff present with patient discussing work-up, results, diagnosis, prognosis.  CODE STATUS discussed with patient and wants to be full code         Persistent atrial fibrillation (HCC)- (present on admission)  Assessment & Plan  Patient takes atenolol every evening and Xarelto at home.  Continue with home medications  Followed by cardiology     Hypothyroidism- (present on admission)  Assessment & Plan  Continue synthroid      Pneumonia- (present on admission)  Assessment & Plan  Ill-defined groundglass opacities in the bilateral lungs, right more than left, likely multifocal pneumonia.   Continue with IV ceftriaxone and azithromycin    VTE prophylaxis: therapeutic anticoagulation with xarelto    Co-morbidities:  See above  Potential Risk - Complications: Contractures, Deep Vein Thrombosis, Malnutrition, Pain, Perceptual Impairment, Pneumonia, Pressure Ulcer, and Infection  Level of Risk: High    Ongoing Medical Management Needed (Medical/Nursing Needs):   Patient Active Problem List    Diagnosis Date Noted    Acute respiratory failure with hypoxia ([facility]) 04/07/2024    Pressure injury of sacral region, stage 2 ([facility]) 09/18/2023    Aortic valve regurgitation 01/20/2023    Generalized weakness 12/29/2022    Psoas hematoma, left, secondary to anticoagulant therapy, subsequent encounter 12/28/2022    Hypokalemia 12/28/2022    Advanced care planning/counseling discussion 10/24/2022    Decreased mobility 06/09/2022    PVD (peripheral vascular disease) ([facility]) 04/19/2021    Falls, initial encounter 01/29/2021    High risk medication use 05/01/2020    Essential hypertension 03/02/2020    Stage 2 chronic kidney disease 03/02/2020    Class 1 obesity due to excess calories with serious comorbidity and body mass index (BMI) of 31.0 to  "31.9 in adult 03/02/2020    Urinary incontinence 12/31/2019    Cysts of both ovaries 10/30/2019    Multiple drug allergies 06/29/2018    Bilateral leg edema 06/29/2018    Persistent atrial fibrillation (HCC) 10/31/2017    Cardiac pacemaker in situ 10/17/2016    Degenerative arthritis of knee, bilateral 06/23/2016    Mild episode of recurrent major depressive disorder (HCC) 07/30/2015    Coronary artery disease involving native coronary artery of native heart 10/07/2014    Hypothyroidism 05/21/2014    Chronic anticoagulation 11/14/2013    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/02/2013    Spinal stenosis, multilevel 07/02/2013    Pneumonia 02/15/2013    Glaucoma 05/03/2011    Macular degeneration 05/03/2011    Personal history of systemic lupus erythematosus (SLE) ([facility]) 10/23/2009        Nicky Del Toro R.N.  Registered Nurse   Progress Notes     Signed     Date of Service: 4/12/2024  5:24 AM    Monitor Summary     Paced 70-80  With a BBB  0.25 / 0.13 / 0.39     Current Vital Signs:   Temperature: (P) 36.7 °C (98.1 °F) Pulse: (P) 79 Respiration: (P) 16 Blood Pressure : (P) 132/67 (RN notified)  Weight: 75.2 kg (165 lb 12.6 oz) Height: 162.6 cm (5' 4.02\")  Pulse Oximetry: (P) 98 % O2 (LPM): (P) 1      Completed Laboratory Reports:  Recent Labs     04/10/24  1100 04/10/24  1355 04/11/24  0038 04/11/24  1015 04/11/24  1429 04/12/24  0828   WBC 8.5  --  10.4  --   --  10.2   HEMOGLOBIN 13.7  --  13.3  --   --  13.7   HEMATOCRIT 41.9  --  40.2  --   --  43.4   PLATELETCT 286  --  274  --   --  309   SODIUM  --  138 136 137 135 136   POTASSIUM  --  4.2 3.9 4.4 4.6 4.9   BUN  --  37* 46* 46* 41* 45*   CREATININE  --  1.78* 1.79* 1.72* 1.62* 1.37   ALBUMIN  --  4.0 3.7 3.8 3.9 3.6   GLUCOSE  --  120* 110* 138* 115* 121*     Additional Labs: Not Applicable    Prior Living Situation:   Housing / Facility: Assisted Living Residence, Independent Living Facility  Lives with - Patient's Self Care Capacity: Alone and Able to Care " For Self  Equipment Owned: Front-Wheel Walker, Single Point Cane, Tub / Shower Seat, Grab Bar(s) In Tub / Shower, Grab Bar(s) By Toilet    Prior Level of Function / Living Situation:   Physical Therapy: Prior Services: Intermittent Physical Support for ADL Per Service  Housing / Facility: Assisted Living Residence, Independent Living Facility  Bathroom Set up: Walk In Shower, Shower Chair, Grab Bars  Equipment Owned: Front-Wheel Walker, Single Point Cane, Tub / Shower Seat, Grab Bar(s) In Tub / Shower, Grab Bar(s) By Toilet  Lives with - Patient's Self Care Capacity: Alone and Able to Care For Self  Bed Mobility: Independent  Transfer Status: Independent  Ambulation: Independent  Assistive Devices Used: Front-Wheel Walker  Current Level of Function:   Gait Level Of Assist: Unable to Participate  Assistive Device: Front Wheel Walker  Distance (Feet): 15  Deviation: Increased Base Of Support, Bradykinetic, Decreased Heel Strike, Decreased Toe Off  Weight Bearing Status: no restrictions  Supine to Sit: Supervised  Sit to Supine: Supervised  Scooting: Supervised  Rolling: Supervised  Skilled Intervention: Verbal Cuing  Comments: HOB raised, use of rail  Sit to Stand: Contact Guard Assist (pt utilizing rocking, few unsuccessful attempts before completing full STS)  Bed, Chair, Wheelchair Transfer: Unable to Participate  Toilet Transfers: Contact Guard Assist  Skilled Intervention: Verbal Cuing, Tactile Cuing, Compensatory Strategies  Sitting in Chair: unable  Sitting Edge of Bed: 5-8 min  Standin min  Occupational Therapy:   Self Feeding: Independent  Grooming / Hygiene: Independent  Bathing: Independent  Dressing: Independent  Toileting: Independent  Medication Management: Requires Assist  Laundry: Requires Assist  Kitchen Mobility: Requires Assist  Finances: Requires Assist  Home Management: Requires Assist  Shopping: Requires Assist  Prior Level Of Mobility: Independent With Device in Home  Driving /  Transportation: Relatives / Others Provide Transportation  Prior Services: Intermittent Physical Support for ADL Per Service  Housing / Facility: Assisted Living Residence, Independent Living Facility  Occupation (Pre-Hospital Vocational): Retired Due To Age  Current Level of Function:   Upper Body Dressing: Supervision  Lower Body Dressing: Contact Guard Assist  Toileting: Supervision  Speech Language Pathology:      Rehabilitation Prognosis/Potential: Good  Estimated Length of Stay: 7-10 days    Nursing:      Incontinent    Scope/Intensity of Services Recommended:  Physical Therapy: 1.5 hr / day  5 days / week. Therapeutic Interventions Required: Maximize Endurance, Mobility, Strength, and Safety  Occupational Therapy: 1.5 hr / day 5 days / week. Therapeutic Interventions Required: Maximize Self Care, ADLs, IADLs, and Energy Conservation  Speech & Language Pathology: SLP Anurag DYE 5 days / week. Therapeutic Interventions Required: Maximize Pending recommendations SLP Cog Eval RR  Rehabilitation Nursin/7. Therapeutic Interventions Required: Monitor Pain, Skin, Wound(s), Vital Signs, Intake and Output, Labs, Safety, Family Training, and DVT Prophylaxis; Bowel and bladder regimen; Infection Control; ADL's.  Rehabilitation Physician: 3 - 5 days / week. Therapeutic Interventions Required: Medical Management  Respiratory Care: Consult. Therapeutic Interventions Required: Pulmonary Toileting, O2 Weaning, and Respiratory care per protocol  Dietician: Consult. Therapeutic Interventions Required: Nutritional evaluation with recommendations to promote optimal health and healing.     She requires 24-hour rehabilitation nursing to manage bowel and bladder function, skin care, wound, nutrition and fluid intake, pulmonary hygiene, pain control, safety, medication management, and patient/family goals. In addition, rehabilitation nursing will reiterate and reinforce therapy skills and equipment use, including ADLs, as  well as provide education to the patient and family. Donte Magaña is willing to participate in and is able to tolerate the proposed plan of care.    Rehabilitation Goals and Plan (Expected frequency & duration of treatment in the IRF):   Return to the Community, Modified Independent Level of Care, and Outpatient Support  Anticipated Date of Rehabilitation Admission: 04/12/2024  Patient/Family oriented IRF level of care/facility/plan: Yes  Patient/Family willing to participate in IRF care/facility/plan: Yes  Patient able to tolerate IRF level of care proposed: Yes  Patient has potential to benefit IRF level of care proposed: Yes  Comments: Not Applicable    Special Needs or Precautions - Medical Necessity:  Safety Concerns/Precautions:  Fall Risk / High Risk for Falls, Balance, Cognition, and Bed / Chair Alarm  Complex Wound Care: Sacral wound care  Pain Management  Requires Oxygen    Diet:   DIET ORDERS (From admission to next 24h)       Start     Ordered    04/11/24 1247  Supplements  ALL MEALS        Question Answer Comment   Which Supplement Ensure    Ensure: Ensure Plus Carton Strawberry and chocolate flavor only per pt request.       04/11/24 1247    04/07/24 1647  Diet Order Diet: Cardiac  ALL MEALS        Question:  Diet:  Answer:  Cardiac    04/07/24 1647                    Anticipated Discharge Destination / Patient/Family Goal:  Destination: Assisted Living Support System: Family  and Attendant  Anticipated home health services: OT, PT, Nursing, and Aide  Previously used HH service/ provider: Not Applicable  Anticipated DME Needs:  To be determined  Outpatient Services:  To be determined  Alternative resources to address additional identified needs:   Future Appointments   Date Time Provider Department Center   7/15/2024  4:20 PM Jolie Escobar M.D. [facility]       Joshua Live  Clinical Admissions Coordinator     Discharge Planning     Signed     Date of Service: 4/12/2024 12:00  PM    PM&R referral from Dr. Harry  Cardiac/Pulmonary debility with ongoing medical management as well as therapy need. Spoke with daughter and POSUSI Huang about goals and expectation for IRF level of care. Discussed therapy plan for 3 hours per day 5 days a week. Discussed therapy schedules 30/45 or 60 minute sessions typically 1.5 hours between breakfast and lunch and another 1.5 hours between lunch and dinner. Discussed PT/OT and SLP roles. Discussed potential length of stay. Discussed comprehensive medical surveillance by physiatry as well as hospitalist team. Discussed patient to nursing ratio. Discussed goal to get Donte to a point that she is successful in returning to [facility] at [place]. Per Reina PLOF was independent with ADL's and used a mobility scooter to get around at the [facility]. [place] was transferring independently. Reina is meet with [facility] with the [facility] at [facility] to look at additional support options. Reina is agreeable to building a case for IRF with [facility] and will reach back to share additional information based on her meeting today. [facility] aware of PM&R referral. Physiatry consult per protocol.     Pre-Screen Completed: 4/12/2024 12:53 PM Keke Hastings R.N.

## 2024-04-12 NOTE — DISCHARGE PLANNING
TCN following. HTH/SCP chart reviewed. No new TCN needs identified. Please see prior TCN note from 4/11 for most recent discharge planning considerations if indicated.     Completed:  PT recommends HH if detention can provide assist, if not then placement on 4/10  OT recommends HH on 4/8  Choice obtained: HH ([facility]) & DME ([facility])  SCP with [facility] PCP.  PCP F/u scheduled 4/15.    *Update*  Collaborated with ANTOINETTE, who reports PT/OT now recommending post acute. TCN discussed with PT, who confirmed recommendations for placement.  TCN saw patient at bedside, and obtained IRF choice, which was faxed to [facility], and explained [facility]'s blanket referral policy for SNF, which patient is agreeable to as well.  Discussed with [facility], per [facility] PMR recommending inpatient rehab and they have a bed today.  TCN reached out to [facility] team for authorization.      ANTOINETTE reports she will call daughter as per CM daughter is considering increasing assist in detention rather than going to IRF.  TCN will continue to follow.

## 2024-04-12 NOTE — PROGRESS NOTES
Called report and gave nurse to nurse with JAMES RN.  Pt was give n the discharge packet and instructions. IV out an Tele box off.  Pt was picked up by [facility] and taken to Rehab facility.

## 2024-04-12 NOTE — PROGRESS NOTES
Hospital Medicine Daily Progress Note    Date of Service  4/11/2024    Chief Complaint  Donte Magaña is a 83 y.o. female admitted 4/7/2024 with shortness of breath    Hospital Course  Donte Magaña is a 83 y.o. female who presented 4/7/2024 with persistent shortness of breath and cough.  Patient has a past medical history of atrial fibrillation on anticoagulation, hypertension, hypothyroidism.  She states that over the last week, she has had a persistent cough.  Patient denies any fevers, chills, nausea, vomiting.  She revisited the emergency department today for worsening shortness of breath.  Viral panel unremarkable.  She currently quires 2 L supplemental oxygen.  CT imaging was obtained showing multifocal pneumonia.  She was empirically started with IV antibiotics in the emergency department.  Patient will be admitted for acute hypoxic respiratory failure       Interval Problem Update  4/8  She is afebrile with normal vitals saturating 91 to 98% on 2 L nasal cannula.  CMP with mild hyperglycemia otherwise unremarkable, CBC MCV 98.2 otherwise unremarkable, blood cultures no growth to date, procalcitonin not elevated, BNP elevated, CRP minimally elevated 1.72.  CTA chest negative for PE has some bilateral groundglass opacities infection versus fluid, all infectious markers are negative.  TTE ordered showed normal LVEF and grade 2 diastolic dysfunction, moderate aortic insufficiency.  Will give a dose of Lasix.     4/9  Afebrile normal pulse and respiratory rate systolic blood pressure 90s to 110s pulse ox 90 to 96% on 1 L nasal cannula.  Increased diuresis today not particularly robust response to Lasix, low suspicion for pneumonia at this time, suspect HFpEF is causing her symptoms, monitor clinical course and restart antibiotics as indicated.  Feeling little weak.  Tolerating p.o. intake.    Above per previous hospitalist.    4/10/2024  Patient was seen and examined on the telemetry floor.  Continues to  be tearful at times.  Continues to have a productive cough.  She is alert and orient x 2.  Echocardiogram showing ejection fraction of 60%, grade 2 diastolic dysfunction.  Continues on furosemide 40 mg IV twice daily.  NT proBNP previously elevated at 1003.  Creatinine elevated at 1.70.  Suspect cardiorenal.  Currently on 2 LPM oxygen by nasal cannula.  Procalcitonin is within normal limits.  White count within normal limits.    4/11/2024  Holding diuresis due to acute kidney injury creatinine of 1.79.   mL bolus, rate of 50 mL/h  Continues to have productive cough.  He is starting azithromycin due to concern for atypical pneumonia.  White count improving to 10.4.  Repeating procalcitonin and chest x-ray.  Patient continues to appear very ill and lethargic.  I have ordered COVID-19 and RSV testing.        I have discussed this patient's plan of care and discharge plan at IDT rounds today with Case Management, Nursing, Nursing leadership, and other members of the IDT team.    Consultants/Specialty      Code Status  Full Code    Disposition  The patient is not medically cleared for discharge to home or a post-acute facility.  Anticipate discharge to: home with organized home healthcare and close outpatient follow-up    I have placed the appropriate orders for post-discharge needs.    Review of Systems  Review of Systems   Constitutional:  Positive for malaise/fatigue. Negative for chills and fever.   Respiratory:  Positive for cough, sputum production and shortness of breath.    Cardiovascular:  Negative for chest pain and palpitations.   Gastrointestinal:  Negative for abdominal pain, nausea and vomiting.   Musculoskeletal:  Negative for joint pain and myalgias.   Neurological:  Negative for dizziness and headaches.        Physical Exam  Temp:  [36.4 °C (97.5 °F)-36.6 °C (97.9 °F)] 36.6 °C (97.9 °F)  Pulse:  [78-90] 80  Resp:  [16-18] 16  BP: ()/(42-55) 116/49  SpO2:  [91 %-97 %] 91 %    Physical  Exam  Vitals and nursing note reviewed.   Constitutional:       Appearance: Normal appearance. She is normal weight. She is not ill-appearing or diaphoretic.      Interventions: Nasal cannula in place.   HENT:      Head: Normocephalic and atraumatic.      Mouth/Throat:      Mouth: Mucous membranes are moist.      Pharynx: Oropharynx is clear. No oropharyngeal exudate.   Eyes:      General:         Right eye: No discharge.         Left eye: No discharge.      Conjunctiva/sclera: Conjunctivae normal.      Pupils: Pupils are equal, round, and reactive to light.   Cardiovascular:      Rate and Rhythm: Normal rate and regular rhythm.      Pulses: Normal pulses.      Heart sounds: Normal heart sounds. No murmur heard.  Pulmonary:      Effort: Pulmonary effort is normal. No respiratory distress.      Breath sounds: Rhonchi and rales present.   Abdominal:      General: Abdomen is flat. Bowel sounds are normal. There is no distension.      Palpations: Abdomen is soft.      Tenderness: There is no abdominal tenderness.   Musculoskeletal:      Cervical back: Neck supple. No tenderness.      Right lower leg: No edema.      Left lower leg: No edema.   Skin:     Coloration: Skin is pale.   Neurological:      Mental Status: She is alert. She is disoriented.      Motor: No weakness.      Comments: Alert and orient x 2   Psychiatric:         Attention and Perception: Attention and perception normal.         Mood and Affect: Affect is tearful.         Behavior: Behavior normal. Behavior is cooperative.         Thought Content: Thought content normal.         Cognition and Memory: Cognition is impaired.         Fluids    Intake/Output Summary (Last 24 hours) at 4/11/2024 2059  Last data filed at 4/11/2024 1639  Gross per 24 hour   Intake 1222.07 ml   Output 1150 ml   Net 72.07 ml       Laboratory  Recent Labs     04/10/24  1100 04/11/24  0038   WBC 8.5 10.4   RBC 4.21 4.05*   HEMOGLOBIN 13.7 13.3   HEMATOCRIT 41.9 40.2   MCV 99.5*  99.3*   MCH 32.5 32.8   MCHC 32.7 33.1   RDW 48.8 49.0   PLATELETCT 286 274   MPV 9.9 9.8     Recent Labs     04/11/24  0038 04/11/24  1015 04/11/24  1429   SODIUM 136 137 135   POTASSIUM 3.9 4.4 4.6   CHLORIDE 98 98 98   CO2 25 26 22   GLUCOSE 110* 138* 115*   BUN 46* 46* 41*   CREATININE 1.79* 1.72* 1.62*   CALCIUM 9.3 9.5 9.4                   Imaging  DX-CHEST-PORTABLE (1 VIEW)   Final Result      1.  Minimal hazy opacity left base, likely atelectasis.      EC-ECHOCARDIOGRAM COMPLETE W/O CONT   Final Result      CT-CTA CHEST PULMONARY ARTERY W/ RECONS   Final Result         1. No CT evidence of pulmonary embolism.      2. Ill-defined groundglass opacities in the bilateral lungs, right more than left, likely multifocal pneumonia.      3. No pleural effusion. No pneumothorax.      DX-CHEST-PORTABLE (1 VIEW)   Final Result      No acute process.      DX-CHEST-PORTABLE (1 VIEW)    (Results Pending)        Assessment/Plan  * Acute heart failure with preserved ejection fraction (HFpEF) (HCC)  Assessment & Plan  With no leukocytosis, procal negative x2, afebrile, elevated BNP I suspect her symptoms are more related to HFpEF than a pneumonia.  Diuresis   Monitor intake and output  Tele monitoring  Assess response to diuretics, monitor respiratory status and BP    04/10/24  Continue furosemide 40 mg IV twice daily  Continuous cardiac monitoring during forced diuresis to monitor for arrhythmias  Monitor electrolytes very closely  Strict ins and outs and daily weights.    4/11/2024  Hold diuresis due to TANIA   bolus, rate 50 ml/hr      Acute respiratory failure with hypoxia (HCC)- (present on admission)  Assessment & Plan  Patient currently requiring 2 L supplemental oxygen  CT imaging: Ill-defined groundglass opacities in the bilateral lungs, right more than left, likely multifocal pneumonia.   No leukocytosis.  Procalcitonin negative  Follow-up cultures  Continue with IV ceftriaxone and  azithromycin    4/10/2024  Likely due to acute HFpEF.  Antibiotics have been discontinued.  On 2 LPM  I have ordered incentive spirometry and PEP therapy  Continuous pulse oximetry monitoring    4/11/2024  Improving to 1 LPM  Hold diuresis  Start azithromycin  Continuous pulse oximetry monitoring    Advanced care planning/counseling discussion- (present on admission)  Assessment & Plan  I spent 17 minutes at bedside in the emergency department with nursing staff present with patient discussing work-up, results, diagnosis, prognosis.  CODE STATUS discussed with patient and wants to be full code       Persistent atrial fibrillation (HCC)- (present on admission)  Assessment & Plan  Patient takes atenolol every evening and Xarelto at home.  Continue with home medications  Followed by cardiology    Hypothyroidism- (present on admission)  Assessment & Plan  Continue synthroid     Pneumonia- (present on admission)  Assessment & Plan  Ill-defined groundglass opacities in the bilateral lungs, right more than left, likely multifocal pneumonia.   Continue with IV ceftriaxone and azithromycin    4/10/2024  Pneumonia has been ruled out    4/11/2024  Productive cough despite adequate diuresis  Concerning for atypical pneumonia  Repeat chest x-ray  Start azithromycin  Repeat procalcitonin  PEP therapy  IS         VTE prophylaxis:    therapeutic anticoagulation with xarelto 20 mg daily witih meals      I have performed a physical exam and reviewed and updated ROS and Plan today (4/11/2024). In review of yesterday's note (4/10/2024), there are no changes except as documented above.

## 2024-04-13 ENCOUNTER — APPOINTMENT (OUTPATIENT)
Dept: OCCUPATIONAL THERAPY | Facility: REHABILITATION | Age: 84
DRG: 291 | End: 2024-04-13
Attending: PHYSICAL MEDICINE & REHABILITATION
Payer: MEDICARE

## 2024-04-13 ENCOUNTER — APPOINTMENT (OUTPATIENT)
Dept: RADIOLOGY | Facility: REHABILITATION | Age: 84
DRG: 291 | End: 2024-04-13
Attending: HOSPITALIST
Payer: MEDICARE

## 2024-04-13 ENCOUNTER — APPOINTMENT (OUTPATIENT)
Dept: PHYSICAL THERAPY | Facility: REHABILITATION | Age: 84
DRG: 291 | End: 2024-04-13
Attending: PHYSICAL MEDICINE & REHABILITATION
Payer: MEDICARE

## 2024-04-13 PROBLEM — D75.89 MACROCYTOSIS WITHOUT ANEMIA: Status: ACTIVE | Noted: 2024-04-13

## 2024-04-13 PROBLEM — E83.39 HYPOPHOSPHATEMIA: Status: ACTIVE | Noted: 2024-04-13

## 2024-04-13 LAB
25(OH)D3 SERPL-MCNC: 63 NG/ML (ref 30–100)
ALBUMIN SERPL BCP-MCNC: 3.4 G/DL (ref 3.2–4.9)
ALBUMIN/GLOB SERPL: 1.1 G/DL
ALP SERPL-CCNC: 69 U/L (ref 30–99)
ALT SERPL-CCNC: 7 U/L (ref 2–50)
ANION GAP SERPL CALC-SCNC: 10 MMOL/L (ref 7–16)
AST SERPL-CCNC: 16 U/L (ref 12–45)
BASOPHILS # BLD AUTO: 0.7 % (ref 0–1.8)
BASOPHILS # BLD: 0.06 K/UL (ref 0–0.12)
BILIRUB SERPL-MCNC: 0.5 MG/DL (ref 0.1–1.5)
BUN SERPL-MCNC: 38 MG/DL (ref 8–22)
CALCIUM ALBUM COR SERPL-MCNC: 9.8 MG/DL (ref 8.5–10.5)
CALCIUM SERPL-MCNC: 9.3 MG/DL (ref 8.5–10.5)
CHLORIDE SERPL-SCNC: 102 MMOL/L (ref 96–112)
CO2 SERPL-SCNC: 25 MMOL/L (ref 20–33)
CREAT SERPL-MCNC: 1.07 MG/DL (ref 0.5–1.4)
EOSINOPHIL # BLD AUTO: 0.49 K/UL (ref 0–0.51)
EOSINOPHIL NFR BLD: 6.1 % (ref 0–6.9)
ERYTHROCYTE [DISTWIDTH] IN BLOOD BY AUTOMATED COUNT: 49.5 FL (ref 35.9–50)
GFR SERPLBLD CREATININE-BSD FMLA CKD-EPI: 51 ML/MIN/1.73 M 2
GLOBULIN SER CALC-MCNC: 3.1 G/DL (ref 1.9–3.5)
GLUCOSE SERPL-MCNC: 96 MG/DL (ref 65–99)
HCT VFR BLD AUTO: 42.7 % (ref 37–47)
HGB BLD-MCNC: 13.6 G/DL (ref 12–16)
IMM GRANULOCYTES # BLD AUTO: 0.06 K/UL (ref 0–0.11)
IMM GRANULOCYTES NFR BLD AUTO: 0.7 % (ref 0–0.9)
LYMPHOCYTES # BLD AUTO: 2.32 K/UL (ref 1–4.8)
LYMPHOCYTES NFR BLD: 29 % (ref 22–41)
MAGNESIUM SERPL-MCNC: 2 MG/DL (ref 1.5–2.5)
MCH RBC QN AUTO: 32.1 PG (ref 27–33)
MCHC RBC AUTO-ENTMCNC: 31.9 G/DL (ref 32.2–35.5)
MCV RBC AUTO: 100.7 FL (ref 81.4–97.8)
MONOCYTES # BLD AUTO: 0.89 K/UL (ref 0–0.85)
MONOCYTES NFR BLD AUTO: 11.1 % (ref 0–13.4)
NEUTROPHILS # BLD AUTO: 4.19 K/UL (ref 1.82–7.42)
NEUTROPHILS NFR BLD: 52.4 % (ref 44–72)
NRBC # BLD AUTO: 0 K/UL
NRBC BLD-RTO: 0 /100 WBC (ref 0–0.2)
NT-PROBNP SERPL IA-MCNC: 1233 PG/ML (ref 0–125)
PHOSPHATE SERPL-MCNC: 2.1 MG/DL (ref 2.5–4.5)
PLATELET # BLD AUTO: 283 K/UL (ref 164–446)
PMV BLD AUTO: 9.9 FL (ref 9–12.9)
POTASSIUM SERPL-SCNC: 4.6 MMOL/L (ref 3.6–5.5)
PROT SERPL-MCNC: 6.5 G/DL (ref 6–8.2)
RBC # BLD AUTO: 4.24 M/UL (ref 4.2–5.4)
SODIUM SERPL-SCNC: 137 MMOL/L (ref 135–145)
T4 FREE SERPL-MCNC: 1.53 NG/DL (ref 0.93–1.7)
TSH SERPL DL<=0.005 MIU/L-ACNC: 0.18 UIU/ML (ref 0.38–5.33)
WBC # BLD AUTO: 8 K/UL (ref 4.8–10.8)

## 2024-04-13 PROCEDURE — 97535 SELF CARE MNGMENT TRAINING: CPT

## 2024-04-13 PROCEDURE — 97165 OT EVAL LOW COMPLEX 30 MIN: CPT

## 2024-04-13 PROCEDURE — 700102 HCHG RX REV CODE 250 W/ 637 OVERRIDE(OP): Performed by: PHYSICAL MEDICINE & REHABILITATION

## 2024-04-13 PROCEDURE — 94760 N-INVAS EAR/PLS OXIMETRY 1: CPT

## 2024-04-13 PROCEDURE — 36415 COLL VENOUS BLD VENIPUNCTURE: CPT

## 2024-04-13 PROCEDURE — 82306 VITAMIN D 25 HYDROXY: CPT

## 2024-04-13 PROCEDURE — 99232 SBSQ HOSP IP/OBS MODERATE 35: CPT | Performed by: GENERAL PRACTICE

## 2024-04-13 PROCEDURE — 97530 THERAPEUTIC ACTIVITIES: CPT

## 2024-04-13 PROCEDURE — 97162 PT EVAL MOD COMPLEX 30 MIN: CPT

## 2024-04-13 PROCEDURE — 71045 X-RAY EXAM CHEST 1 VIEW: CPT

## 2024-04-13 PROCEDURE — A9270 NON-COVERED ITEM OR SERVICE: HCPCS | Performed by: PHYSICAL MEDICINE & REHABILITATION

## 2024-04-13 PROCEDURE — 80053 COMPREHEN METABOLIC PANEL: CPT

## 2024-04-13 PROCEDURE — 770010 HCHG ROOM/CARE - REHAB SEMI PRIVAT*

## 2024-04-13 PROCEDURE — 83880 ASSAY OF NATRIURETIC PEPTIDE: CPT

## 2024-04-13 PROCEDURE — 85025 COMPLETE CBC W/AUTO DIFF WBC: CPT

## 2024-04-13 PROCEDURE — 700111 HCHG RX REV CODE 636 W/ 250 OVERRIDE (IP): Performed by: PHYSICAL MEDICINE & REHABILITATION

## 2024-04-13 PROCEDURE — 99222 1ST HOSP IP/OBS MODERATE 55: CPT | Mod: AI | Performed by: HOSPITALIST

## 2024-04-13 PROCEDURE — 84439 ASSAY OF FREE THYROXINE: CPT

## 2024-04-13 PROCEDURE — 700102 HCHG RX REV CODE 250 W/ 637 OVERRIDE(OP): Performed by: HOSPITALIST

## 2024-04-13 PROCEDURE — A9270 NON-COVERED ITEM OR SERVICE: HCPCS | Performed by: HOSPITALIST

## 2024-04-13 PROCEDURE — 83735 ASSAY OF MAGNESIUM: CPT

## 2024-04-13 PROCEDURE — 94669 MECHANICAL CHEST WALL OSCILL: CPT

## 2024-04-13 PROCEDURE — 84100 ASSAY OF PHOSPHORUS: CPT

## 2024-04-13 PROCEDURE — 70360 X-RAY EXAM OF NECK: CPT

## 2024-04-13 PROCEDURE — 94640 AIRWAY INHALATION TREATMENT: CPT

## 2024-04-13 PROCEDURE — 84443 ASSAY THYROID STIM HORMONE: CPT

## 2024-04-13 RX ORDER — LEVOTHYROXINE SODIUM 0.07 MG/1
37.5 TABLET ORAL
Status: DISCONTINUED | OUTPATIENT
Start: 2024-04-14 | End: 2024-04-23 | Stop reason: HOSPADM

## 2024-04-13 RX ORDER — MONTELUKAST SODIUM 10 MG/1
10 TABLET ORAL NIGHTLY
Status: DISCONTINUED | OUTPATIENT
Start: 2024-04-13 | End: 2024-04-23 | Stop reason: HOSPADM

## 2024-04-13 RX ADMIN — MIRTAZAPINE 15 MG: 15 TABLET, ORALLY DISINTEGRATING ORAL at 20:59

## 2024-04-13 RX ADMIN — NYSTATIN: 100000 POWDER TOPICAL at 21:00

## 2024-04-13 RX ADMIN — NYSTATIN 1 APPLICATION: 100000 POWDER TOPICAL at 09:56

## 2024-04-13 RX ADMIN — FLUTICASONE FUROATE, UMECLIDINIUM BROMIDE AND VILANTEROL TRIFENATATE 1 PUFF: 100; 62.5; 25 POWDER RESPIRATORY (INHALATION) at 07:08

## 2024-04-13 RX ADMIN — RIVAROXABAN 15 MG: 15 TABLET, FILM COATED ORAL at 18:16

## 2024-04-13 RX ADMIN — DIBASIC SODIUM PHOSPHATE, MONOBASIC POTASSIUM PHOSPHATE AND MONOBASIC SODIUM PHOSPHATE 500 MG: 852; 155; 130 TABLET ORAL at 12:57

## 2024-04-13 RX ADMIN — SERTRALINE HYDROCHLORIDE 50 MG: 50 TABLET ORAL at 09:55

## 2024-04-13 RX ADMIN — ATENOLOL 50 MG: 25 TABLET ORAL at 21:09

## 2024-04-13 RX ADMIN — DIBASIC SODIUM PHOSPHATE, MONOBASIC POTASSIUM PHOSPHATE AND MONOBASIC SODIUM PHOSPHATE 500 MG: 852; 155; 130 TABLET ORAL at 18:16

## 2024-04-13 RX ADMIN — MONTELUKAST 10 MG: 10 TABLET, FILM COATED ORAL at 20:59

## 2024-04-13 RX ADMIN — PREDNISONE 40 MG: 20 TABLET ORAL at 09:56

## 2024-04-13 RX ADMIN — DEXTROMETHORPHAN POLISTIREX 60 MG: 30 SUSPENSION ORAL at 09:54

## 2024-04-13 RX ADMIN — VIBEGRON 75 MG: 75 TABLET, FILM COATED ORAL at 09:00

## 2024-04-13 RX ADMIN — DEXTROMETHORPHAN POLISTIREX 60 MG: 30 SUSPENSION ORAL at 21:00

## 2024-04-13 RX ADMIN — BENZONATATE 100 MG: 100 CAPSULE ORAL at 21:23

## 2024-04-13 RX ADMIN — DIBASIC SODIUM PHOSPHATE, MONOBASIC POTASSIUM PHOSPHATE AND MONOBASIC SODIUM PHOSPHATE 500 MG: 852; 155; 130 TABLET ORAL at 20:59

## 2024-04-13 RX ADMIN — LEVOTHYROXINE SODIUM 50 MCG: 0.05 TABLET ORAL at 06:30

## 2024-04-13 RX ADMIN — AZITHROMYCIN MONOHYDRATE 500 MG: 250 TABLET ORAL at 09:55

## 2024-04-13 ASSESSMENT — GAIT ASSESSMENTS
ASSISTIVE DEVICE: PARALLEL BARS
DISTANCE (FEET): 10
GAIT LEVEL OF ASSIST: CONTACT GUARD ASSIST
DEVIATION: STEP TO;BRADYKINETIC;DECREASED HEEL STRIKE;DECREASED TOE OFF

## 2024-04-13 ASSESSMENT — ENCOUNTER SYMPTOMS
EYES NEGATIVE: 1
SHORTNESS OF BREATH: 0
POLYDIPSIA: 0
COUGH: 0
PALPITATIONS: 0
BRUISES/BLEEDS EASILY: 0
VOMITING: 0
MUSCULOSKELETAL NEGATIVE: 1
CHILLS: 0
FEVER: 0
DEPRESSION: 1
NAUSEA: 0
WEAKNESS: 1
ABDOMINAL PAIN: 0

## 2024-04-13 ASSESSMENT — BRIEF INTERVIEW FOR MENTAL STATUS (BIMS)
WHAT YEAR IS IT: CORRECT
WHAT MONTH IS IT: ACCURATE WITHIN 5 DAYS
ASKED TO RECALL BLUE: YES, AFTER CUEING (A COLOR")"
ASKED TO RECALL SOCK: NO, COULD NOT RECALL
INITIAL REPETITION OF BED BLUE SOCK - FIRST ATTEMPT: 3
WHAT DAY OF THE WEEK IS IT: NO ANSWER
ASKED TO RECALL BED: NO, COULD NOT RECALL
BIMS SUMMARY SCORE: 9

## 2024-04-13 ASSESSMENT — PATIENT HEALTH QUESTIONNAIRE - PHQ9
1. LITTLE INTEREST OR PLEASURE IN DOING THINGS: NOT AT ALL
2. FEELING DOWN, DEPRESSED, IRRITABLE, OR HOPELESS: NOT AT ALL
2. FEELING DOWN, DEPRESSED, IRRITABLE, OR HOPELESS: NOT AT ALL
SUM OF ALL RESPONSES TO PHQ9 QUESTIONS 1 AND 2: 0
1. LITTLE INTEREST OR PLEASURE IN DOING THINGS: NOT AT ALL
SUM OF ALL RESPONSES TO PHQ9 QUESTIONS 1 AND 2: 0

## 2024-04-13 ASSESSMENT — ACTIVITIES OF DAILY LIVING (ADL)
BED_CHAIR_WHEELCHAIR_TRANSFER_DESCRIPTION: INCREASED TIME;SET-UP OF EQUIPMENT;SUPERVISION FOR SAFETY;SQUAT PIVOT TRANSFER TO WHEELCHAIR
TOILET_TRANSFER_DESCRIPTION: GRAB BAR;INCREASED TIME;SUPERVISION FOR SAFETY;VERBAL CUEING
TUB_SHOWER_TRANSFER_DESCRIPTION: GRAB BAR;SHOWER BENCH;INCREASED TIME;SET-UP OF EQUIPMENT;SUPERVISION FOR SAFETY;VERBAL CUEING
TOILETING: INDEPENDENT
BED_CHAIR_WHEELCHAIR_TRANSFER_DESCRIPTION: INCREASED TIME;INITIAL PREPARATION FOR TASK;SET-UP OF EQUIPMENT;SUPERVISION FOR SAFETY;VERBAL CUEING

## 2024-04-13 NOTE — CONSULTS
HOSPITAL MEDICINE CONSULTATION    Requesting Physician:  Dr. Mercedes    Reason for Consult:  Atrial Fibrillation, Hypertension    History of Present Illness:  The patient is an 83-year-old  female with past medical history significant for atrial fibrillation status post permanent pacemaker on Xarelto, hypertension, hypothyroidism, and depression.  She was admitted to [facility] on 4/7/24 for cough and shortness of breath.  CT angiogram demonstrated bilateral ground glass opacities and no pulmonary embolism.  Echocardiogram showed ejection fraction 60%, grade II diastolic dysfunction, mild mitral regurgitation, and moderate aortic insufficiency.  The patient was treated with empiric antimicrobial therapy and diuretics.  She developed acute kidney and was then given intravenous fluids.  She remains on Azithromycin and Prednisone.  Due to her ongoing functional debility, the patient was transferred to [facility] on 4/12/24.  Hospital Medicine consultation is requested to assist in the management of this patient's AFib and HTN.  She is also noted to have macrocytosis, hypophosphatemia, and abnormal thyroid studies.  A Rapid Response was activated when the patient choked on a pill, which she successfully coughed up.    Review of Systems:  Review of Systems   Constitutional:  Negative for chills and fever.   HENT: Negative.     Eyes: Negative.    Respiratory:  Negative for cough and shortness of breath.    Cardiovascular:  Negative for chest pain and palpitations.   Gastrointestinal:  Negative for abdominal pain, nausea and vomiting.   Musculoskeletal: Negative.    Skin:  Negative for itching and rash.   Neurological:  Positive for weakness.   Endo/Heme/Allergies:  Negative for polydipsia. Does not bruise/bleed easily.   Psychiatric/Behavioral:  Positive for depression.    All other systems reviewed and are negative.      Allergies:  Allergies   Allergen Reactions     "Amiodarone Hives     Throat and tongue itching    Bactrim Shortness of Breath    Cipro Xr Swelling    Metoprolol Swelling     Causes throat swelling    Morphine Unspecified     Hallucinations    Phytoplex Z-Guard [Petrolatum-Zinc Oxide] Unspecified     \"causes burning\"    Pseudoephedrine Palpitations    Qvar [Beclomethasone Dipropionate] Unspecified     Pressure on heart      Vibramycin Shortness of Breath    Atorvastatin Calcium-Polysorbate 80 Unspecified     Muscle aches      Augmentin Unspecified     Unknown reaction    Diltiazem Rash     rash    Flecainide Unspecified     dizziness    Keflex Unspecified     Pt states \"Unsure\".    Mucinex Unspecified     GI Distress      Tramadol Unspecified     crying    Atorvastatin Myalgia    Tape Rash     Paper tape okay       Medications:    Current Facility-Administered Medications:     [START ON 4/14/2024] levothyroxine (Synthroid) tablet 37.5 mcg, 37.5 mcg, Oral, AM ES, Sharon Crowell M.D.    montelukast (Singulair) tablet 10 mg, 10 mg, Oral, Nightly, Sharon Crowell M.D.    phosphorus (K-Phos-Neutral) per tablet 500 mg, 500 mg, Oral, TID, Sharon Crowell M.D., 500 mg at 04/13/24 1257    Respiratory Therapy Consult, , Nebulization, Continuous RT, Lisa Mercedes D.O.    Pharmacy Consult Request ...Pain Management Review 1 Each, 1 Each, Other, PHARMACY TO DOSE, Lisa Mercedes D.O.    hydrALAZINE (Apresoline) tablet 25 mg, 25 mg, Oral, Q8HRS PRN, Lisa Mercedes D.O.    carboxymethylcellulose (Refresh Tears) 0.5 % ophthalmic drops 1 Drop, 1 Drop, Both Eyes, PRN, Lisa Mercedes D.O.    benzocaine-menthol (Cepacol) lozenge 1 Lozenge, 1 Lozenge, Mouth/Throat, Q2HRS PRN, Lisa Mercedes D.O.    traZODone (Desyrel) tablet 50 mg, 50 mg, Oral, QHS PRN, Lisa Mercedes D.O.    sodium chloride (Ocean) 0.65 % nasal spray 2 Spray, 2 Spray, Nasal, PRN, Lisa A Mercedes, D.O.    acetaminophen (Tylenol) tablet 500 mg, 500 mg, Oral, Q6HRS PRN, Lisa Mercedes D.O.    atenolol " "(Tenormin) tablet 50 mg, 50 mg, Oral, Q EVENING, HERMANN BurrellO., 50 mg at 04/12/24 2023    azithromycin (Zithromax) tablet 500 mg, 500 mg, Oral, DAILY, HERMANN BurrellO., 500 mg at 04/13/24 0955    Dextromethorphan Polistirex ER (Delsym) 30 MG/5ML suspension 60 mg, 60 mg, Oral, BID, HERMANN BurrellO., 60 mg at 04/13/24 0954    fluticasone-umeclidinium-vilanterol (Trelegy Ellipta) 100-62.5-25 mcg/act inhaler 1 Puff, 1 Puff, Inhalation, QDAILY (RT), Lisa Mercedes D.O., 1 Puff at 04/13/24 0708    mirtazapine (Remeron) orally disintegrating tab 15 mg, 15 mg, Oral, QHS, HERMANN BurrellO., 15 mg at 04/12/24 2023    nystatin (Mycostatin) powder, , Topical, BID, Lisa Mercedes D.O., 1 Application at 04/13/24 0956    rivaroxaban (Xarelto) tablet 15 mg, 15 mg, Oral, PM MEAL, HERMANN BurrellO., 15 mg at 04/12/24 1755    sertraline (Zoloft) tablet 50 mg, 50 mg, Oral, DAILY, HERMANN BurrellO., 50 mg at 04/13/24 0955    vibegron (Gemtesa) tablet 75 mg, 75 mg, Oral, DAILY, HERMANN BurrellO., 75 mg at 04/13/24 0900    ALPRAZolam (Xanax) tablet 0.25 mg, 0.25 mg, Oral, QDAY PRN, Lisa Mercedes D.O., 0.25 mg at 04/12/24 2023    benzonatate (Tessalon) capsule 100 mg, 100 mg, Oral, TID PRN, Lisa Mercedes D.O.    ipratropium-albuterol (DUONEB) nebulizer solution, 3 mL, Nebulization, Q4H PRN (RT), Lisa Mercedes D.O.    menthol (Halls) lozenge 1 Lozenge, 1 Lozenge, Oral, Q2HRS PRN, Lisa Mercedes D.O.    predniSONE (Deltasone) tablet 40 mg, 40 mg, Oral, DAILY, Lisa Mercedes D.O., 40 mg at 04/13/24 0956    Past Medical/Surgical History:  Past Medical History:   Diagnosis Date    A-fib (HCC)     Anesthesia     \"Tachycardia for 5 days after cataract surgery\"    Anticoagulant long-term use 1/12/2012    Arthritis     osteo-Knees, hips    Atrial fibrillation (HCC)     Backpain     R hip    Bowel habit changes     diarrhea    Breath shortness     with exertion, prn O2 2L "    Bronchitis Nov, 2013    CAD (coronary artery disease)     Depression     Glaucoma 5/3/2011    Hematoma complicating a procedure 11/3/2012    Hemorrhagic disorder (HCC)     bruising/coumadin    Hypertension     Hypothyroid     Lupus (HCC)     Macular degeneration     Menopause 1/12/2012    Mitral regurgitation 10/30/2012    Obesity 1/12/2012    Pacemaker 2018    Pneumonia feb,2013    Pre-syncope 6/29/2018    Pulmonary hypertension (HCC) 10/30/2012    PVC's (premature ventricular contractions) 1/12/2012    Senile nuclear sclerosis     Spinal stenosis of lumbar region at multiple levels     Unspecified cataract     repaired bilateral    Unspecified urinary incontinence     Urinary bladder disorder      Past Surgical History:   Procedure Laterality Date    WY COMBINED ANT/POST COLPORRHAPHY  1/14/2020    Procedure: COLPORRHAPHY, COMBINED ANTEROPOSTERIOR - PERINEOPLASTY;  Surgeon: Waqas Robin M.D.;  Location: SURGERY SAME DAY [facility];  Service: Gynecology    WY LAP,DIAGNOSTIC ABDOMEN  1/14/2020    Procedure: PELVISCOPY;  Surgeon: Waqas Robin M.D.;  Location: SURGERY SAME DAY [facility];  Service: Gynecology    ENTEROCELE REPAIR  1/14/2020    Procedure: REPAIR, ENTEROCELE;  Surgeon: Waqas Robin M.D.;  Location: SURGERY SAME DAY [facility];  Service: Gynecology    BLADDER SLING FEMALE  1/14/2020    Procedure: BLADDER SLING, FEMALE - TOT;  Surgeon: Waqas Robin M.D.;  Location: SURGERY SAME DAY [facility];  Service: Gynecology    VAGINAL SUSPENSION  1/14/2020    Procedure: COLPOPEXY - SACROSPINOUS VAULT SUSPENSION;  Surgeon: Waqas Robin M.D.;  Location: SURGERY SAME DAY [facility];  Service: Gynecology    SALPINGO OOPHORECTOMY Bilateral 1/14/2020    Procedure: SALPINGO-OOPHORECTOMY;  Surgeon: Waqas Robin M.D.;  Location: SURGERY SAME DAY [facility];  Service: Gynecology    IRRIGATION & DEBRIDEMENT ORTHO Right 2/14/2019    Procedure: IRRIGATION & DEBRIDEMENT ORTHO-HIP WOUND ;   Surgeon: Vladimir Lee M.D.;  Location: SURGERY [facility];  Service: Orthopedics    HIP ARTH ANTERIOR TOTAL Right 1/17/2019    Procedure: HIP ARTHROPLASTY ANTERIOR TOTAL;  Surgeon: Juan C Mercedes M.D.;  Location: SURGERY [facility];  Service: Orthopedics    PACEMAKER INSERTION  06/30/2018    Dual Chamber    KNEE ARTHROPLASTY TOTAL Right 6/23/2016    Procedure: KNEE ARTHROPLASTY TOTAL;  Surgeon: Heriberto Lozada M.D.;  Location: SURGERY [facility];  Service:     KNEE ARTHROPLASTY TOTAL Left 5/28/2015    Procedure: KNEE ARTHROPLASTY TOTAL;  Surgeon: Heriberto Lozada M.D.;  Location: SURGERY [facility];  Service:     CATARACT PHACO WITH IOL Right 5/5/2015    Procedure: IOL OD - STANDARD;  Surgeon: Dmitry Bejarano M.D.;  Location: SURGERY [facility];  Service:     CATARACT PHACO WITH IOL  4/21/2015    Performed by Dmitry Bejarano M.D. at SURGERY [facility]S    RECOVERY  11/30/2010    Performed by SURGERY, CATH-RECOVERY at SURGERY SAME DAY [facility]    COLONOSCOPY  2008    Normal    [facility]    ABDOMINAL HYSTERECTOMY TOTAL  April 15,1975    still has ovaries    OPEN REDUCTION      left ankle    OTHER      Removed pins from left ankle    OTHER CARDIAC SURGERY  12/2017 and 07/19/2018     Cardiac Ablation    TONSILLECTOMY AND ADENOIDECTOMY         Social History:  Social History     Socioeconomic History    Marital status:      Spouse name: Not on file    Number of children: Not on file    Years of education: Not on file    Highest education level: Not on file   Occupational History    Not on file   Tobacco Use    Smoking status: Never    Smokeless tobacco: Never   Vaping Use    Vaping Use: Never used   Substance and Sexual Activity    Alcohol use: No    Drug use: No    Sexual activity: Not Currently     Partners: Male     Birth control/protection: Post-Menopausal   Other Topics Concern    Not on file   Social History Narrative    Not on file     Social Determinants of  Health     Financial Resource Strain: Not on file   Food Insecurity: Not on file   Transportation Needs: Not on file   Physical Activity: Not on file   Stress: Not on file   Social Connections: Feeling Socially Integrated (4/4/2024)    OASIS : Social Isolation     Frequency of experiencing loneliness or isolation: Never   Intimate Partner Violence: Not on file   Housing Stability: Not on file       Family History:  Family History   Problem Relation Age of Onset    Stroke Mother     Diabetes Father     Stroke Sister     Heart Disease Brother     Stroke Sister     GI Disease Daughter         Crohn's Disease    Heart Disease Daughter         CHF    Other Daughter         Chronic Pain--Lymphedema    Cancer Paternal Aunt         breast       Physical Examination:   Vitals:    04/13/24 0720 04/13/24 0750 04/13/24 1008 04/13/24 1019   BP: (!) 148/74   132/67   Pulse: 87   80   Resp:    20   Temp:    36.4 °C (97.5 °F)   TempSrc:    Oral   SpO2: 97% 88% 97% 97%   Weight:       Height:           Physical Exam  Vitals reviewed.   Constitutional:       General: She is not in acute distress.     Appearance: Normal appearance. She is not ill-appearing.   HENT:      Head: Normocephalic and atraumatic.      Right Ear: External ear normal.      Left Ear: External ear normal.      Nose: Nose normal.      Mouth/Throat:      Pharynx: Oropharynx is clear.   Eyes:      General:         Right eye: No discharge.         Left eye: No discharge.      Conjunctiva/sclera: Conjunctivae normal.   Cardiovascular:      Rate and Rhythm: Normal rate and regular rhythm.   Pulmonary:      Effort: No respiratory distress.      Breath sounds: No wheezing.      Comments: Coarse BS  Abdominal:      General: Bowel sounds are normal. There is no distension.      Palpations: Abdomen is soft.      Tenderness: There is no abdominal tenderness.   Musculoskeletal:      Cervical back: Normal range of motion and neck supple.      Right lower leg: No edema.       Left lower leg: No edema.   Skin:     General: Skin is warm and dry.   Neurological:      Mental Status: She is alert and oriented to person, place, and time.         Laboratory Data:  Recent Labs     04/11/24  0038 04/12/24  0828 04/13/24  0615   WBC 10.4 10.2 8.0   RBC 4.05* 4.26 4.24   HEMOGLOBIN 13.3 13.7 13.6   HEMATOCRIT 40.2 43.4 42.7   MCV 99.3* 101.9* 100.7*   MCH 32.8 32.2 32.1   MCHC 33.1 31.6* 31.9*   RDW 49.0 51.3* 49.5   PLATELETCT 274 309 283   MPV 9.8 9.9 9.9     Recent Labs     04/11/24  1429 04/12/24  0828 04/13/24  0615   SODIUM 135 136 137   POTASSIUM 4.6 4.9 4.6   CHLORIDE 98 100 102   CO2 22 27 25   GLUCOSE 115* 121* 96   BUN 41* 45* 38*   CREATININE 1.62* 1.37 1.07   CALCIUM 9.4 9.6 9.3           Impressions/Recommendations:  Persistent atrial fibrillation (HCC)  On Atenolol for rate control  Anticoagulated on Xarelto    Mild episode of recurrent major depressive disorder (HCC)  On Zoloft    Hypothyroidism  TSH 0.18 and FT4 1.53  Decrease Synthroid    Essential hypertension  Observe blood pressure trends on Atenolol    Multiple drug allergies  Noted    Hypophosphatemia  Start supplement    Azotemia  Continue to hold Lasix, Aldactone, and Lisinopril  Closely follow renal function    Macrocytosis without anemia  Folate testing no longer performed at this facility   Check Vit B12  Suspect related to hypothyroidism  Follow H/H    Acute respiratory failure (HCC)  CT bilateral ground glass opacities, negative for PE  Echo EF 60%, grade II diastolic dysfunction, mild MR/mod AI  On Azithromycin and Prednisone per [facility] D/C Summary  BNP worse off Lasix  Check STAT PCXR and Soft Tissue Neck for possible aspiration and questionable stridor  SLP consult  On Trelegy and Delsym  Add Singulair  RT protocol    Full Code    Discussed with patient, Staff, and Dr. Gonsalves.  Thank you for the opportunity to assist in this patient's care.  We will continue to follow along with you.

## 2024-04-13 NOTE — THERAPY
"Physical Therapy   Initial Evaluation     Patient Name: Donte Magaña  Age:  83 y.o., Sex:  female  Medical Record #: [id number]  Today's Date: 4/13/2024     Subjective    Pt reported using scooter nearly 100% of the time, with 2-3 ft of ambulation for bathroom use, where she would use the \"wall\" to balance. Pt reported that her ambulation was limited by SOB.     Objective       04/13/24 1031   PT Charge Group   PT Therapeutic Activities (Units) 1   PT Evaluation PT Evaluation Mod   PT Total Time Spent   PT Individual Total Time Spent (Mins) 60   Prior Living Situation   Prior Services Housekeeping / Homemaker Services;Intermittent Physical Support for ADL Per Service   Housing / Facility Assisted Living Residence  (Lives on second floor)   Elevator Yes   Equipment Owned Scooter;Single Point Cane;Front-Wheel Walker   Lives with - Patient's Self Care Capacity Alone and Unable to Care For Self  (senior care-> meals/ cleaning)   Prior Level of Functional Mobility   Bed Mobility Independent   Transfer Status Independent   Ambulation Independent   Distance Ambulation (Feet) 3   Assistive Devices Used Scooter   Wheelchair Independent  (\"scooter\")   Stairs Other (Comments)  (Indep, but not for \"a few months\", used hand rail, \"one step at a time\")   Prior Functioning: Everyday Activities   Self Care Independent   Indoor Mobility (Ambulation) Independent   Stairs Independent   Functional Cognition Needed some help   Prior Device Use Motorized wheelchair or scooter   Vitals   O2 (LPM) 2   O2 Delivery Device Nasal Cannula   Pain 0 - 10 Group   Therapist Pain Assessment 0   Cognition    Attention Impaired   Sequencing Impaired   Strength Lower Body   Lower Body Strength  X   Gross Strength Generalized Weakness, Equal Bilaterally   Comments 3+ to 4-/ 5 grossly BLE   Sensation Lower Body   Lower Extremity Sensation   Not Tested   Lower Body Muscle Tone   Lower Body Muscle Tone  Not Tested   Bed Mobility    Supine to Sit Minimal Assist "   Sit to Supine Standby Assist   Sit to Stand Minimal Assist   Scooting Standby Assist   Rolling Standby Assist   Neurological Concerns   Comments Pt reported no numbness/ tingling   Coordination Lower Body    Coordination Lower Body  Not Tested   Roll Left and Right   Assistance Needed Supervision   CARE Score - Roll Left and Right 4   Roll Left and Right Discharge Goal   Discharge Goal 6   Sit to Lying   Assistance Needed Supervision   CARE Score - Sit to Lying 4   Sit to Lying Discharge Goal   Discharge Goal 6   Lying to Sitting on Side of Bed   Assistance Needed Physical assistance   Physical Assistance Level 25% or less   CARE Score - Lying to Sitting on Side of Bed 3   Lying to Sitting on Side of Bed Discharge Goal   Discharge Goal 6   Sit to Stand   Assistance Needed Physical assistance   Physical Assistance Level 25% or less   CARE Score - Sit to Stand 3   Sit to Stand Discharge Goal   Discharge Goal 6   Chair/Bed-to-Chair Transfer   Assistance Needed Physical assistance   Physical Assistance Level 26%-50%   CARE Score - Chair/Bed-to-Chair Transfer 3   Chair/Bed-to-Chair Transfer Discharge Goal   Discharge Goal 6   Car Transfer   Assistance Needed Physical assistance   Physical Assistance Level 26%-50%   CARE Score - Car Transfer 3   Car Transfer Discharge Goal   Discharge Goal 5   Walk 10 Feet   Assistance Needed Incidental touching;Adaptive equipment   CARE Score - Walk 10 Feet 4   Walk 10 Feet Discharge Goal   Discharge Goal 6   Walk 50 Feet with Two Turns   Assistance Needed Physical assistance  (fatigues quickly)   Physical Assistance Level Total assistance   CARE Score - Walk 50 Feet with Two Turns 1   Walk 50 Feet with Two Turns Discharge Goal   Discharge Goal 5   Walk 150 Feet   Assistance Needed Physical assistance   Physical Assistance Level Total assistance   CARE Score - Walk 150 Feet 1   Walk 150 Feet Discharge Goal   Discharge Goal 4   Walking 10 Feet on Uneven Surfaces   Assistance Needed  Physical assistance   Physical Assistance Level 25% or less   CARE Score - Walking 10 Feet on Uneven Surfaces 3   Walking 10 Feet on Uneven Surfaces Discharge Goal   Discharge Goal 5   1 Step (Curb)   Assistance Needed Physical assistance;Adaptive equipment   Physical Assistance Level 25% or less   CARE Score - 1 Step (Curb) 3   1 Step (Curb) Discharge Goal   Discharge Goal 5   4 Steps   Assistance Needed Physical assistance   Physical Assistance Level 25% or less   CARE Score - 4 Steps 3   4 Steps Discharge Goal   Discharge Goal 4   12 Steps   Assistance Needed Physical assistance   Physical Assistance Level Total assistance  (Fatigued after 2 steps)   CARE Score - 12 Steps 1   12 Steps Discharge Goal   Discharge Goal 3   Picking Up Object   Assistance Needed Incidental touching;Adaptive equipment  (use of reacher)   CARE Score - Picking Up Object 4   Picking Up Object Discharge Goal   Discharge Goal 6   Wheel 50 Feet with Two Turns   Assistance Needed Supervision   CARE Score - Wheel 50 Feet with Two Turns 4   Type of Wheelchair/Scooter Manual   Wheel 50 Feet with Two Turns Discharge Goal   Discharge Goal 6   Wheel 150 Feet   Assistance Needed Physical assistance   Physical Assistance Level Total assistance  (fatigued after 50 ft; primary scooter use as PLOF, anticipate short amb, and community wc use at D/C)   CARE Score - Wheel 150 Feet 1   Type of Wheelchair/Scooter Manual   Wheel 150 Feet Discharge Goal   Discharge Goal 6   Gait Functional Level of Assist    Gait Level Of Assist Contact Guard Assist   Assistive Device Parallel Bars   Distance (Feet) 10   # of Times Distance was Traveled 2   Deviation Step To;Bradykinetic;Decreased Heel Strike;Decreased Toe Off   Wheelchair Functional Level of Assist   Wheelchair Assist Supervised   Distance Wheelchair (Feet or Distance) 50   Wheelchair Description Extra time;Limited by fatigue;Supervision for safety;Verbal cueing   Stairs Functional Level of Assist   Level of  "Assist with Stairs Minimal Assist   # of Stairs Climbed 2  (4\")   Stairs Description Hand rails;Extra time;Limited by fatigue;Safety concerns   Transfer Functional Level of Assist   Bed, Chair, Wheelchair Transfer Moderate Assist   Bed Chair Wheelchair Transfer Description Initial preparation for task;Set-up of equipment;Squat pivot transfer to wheelchair   Problem List    Problems Impaired Bed Mobility;Impaired Transfers;Impaired Ambulation;Functional Strength Deficit;Impaired Balance;Decreased Activity Tolerance;Motor Planning / Sequencing   Precautions   Precautions Fall Risk;Pacemaker Precautions   Comments Pacemaker 2 years old per pt report   Current Discharge Plan   Current Discharge Plan Return to Prior Living Situation   Interdisciplinary Plan of Care Collaboration   IDT Collaboration with  Other (See Comments);Nursing   Patient Position at End of Therapy Seated;Other (Comments);Self Releasing Lap Belt Applied;Chair Alarm On  (handoff to Xray for STAT Chest image)   Collaboration Comments RN approved tx with patient after a recent rapid response call d/t choking on pill/ POC.   PT DME Recommendations   Additional Equipment   (Non aticipated; pt has FWW, SPC, scooter)   Benefit   Therapy Benefit Patient Would Benefit from Inpatient Rehabilitation Physical Therapy to Maximize Functional [place] with ADLs, IADLs and Mobility.   Strengths & Barriers   Strengths Able to follow instructions;Motivated for self care and independence;Pleasant and cooperative;Willingly participates in therapeutic activities  (detention- elevator access)   Barriers Decreased endurance;Fatigue;Generalized weakness;Impaired activity tolerance;Impaired balance;Limited mobility  (PLOF- primary scooter user)     Pt educated on rehab expectations and POC.     Assessment  Patient is 83 y.o. female who presented to [facility] 4/7/2024 with persistent shortness of breath and cough.  CT showed multifocal pneumonia.  She was empirically started on IV " antibiotics and was admitted for acute hypoxemic respiratory failure.  Additional factors influencing patient status / progress (ie: cognitive factors, co-morbidities, social support, etc): PMH- significant for atrial fibrillation on AC, hypertension, hypothyroidism.  Pt lives in [place], and uses elevator access. Her primary mode of mobility, is with use of her scooter.    Plan  Recommend Physical Therapy  minutes per day 5-7 days per week for 2 weeks for the following treatments:  PT Group Therapy, PT E Stim Attended, PT Gait Training, PT Self Care/Home Eval, PT Therapeutic Exercises, PT TENS Application, PT Neuro Re-Ed/Balance, PT Aquatic Therapy, PT Therapeutic Activity, PT Manual Therapy, and PT Evaluation.    Passport items to be completed:  Get in/out of bed safely, in/out of a vehicle, safely use mobility device, walk or wheel around home/community, navigate up and down stairs, show how to get up/down from the ground, ensure home is accessible, demonstrate HEP, complete caregiver training    Goals:  Long term and short term goals have been discussed with patient and they are in agreement.    Physical Therapy Problems (Active)       Problem: Mobility       Dates: Start:  04/13/24         Goal: STG-Within one week, patient will propel wheelchair community >100 ft indoor SPV       Dates: Start:  04/13/24            Goal: STG-Within one week, patient will ambulate household distance 20 ft x 2 indoors with FWW       Dates: Start:  04/13/24               Problem: Mobility Transfers       Dates: Start:  04/13/24         Goal: STG-Within one week, patient will transfer bed to chair SPT FWW CGA       Dates: Start:  04/13/24               Problem: PT-Long Term Goals       Dates: Start:  04/13/24         Goal: LTG-By discharge, patient will propel wheelchair/ scooter mod I >150 ft over various surfaces       Dates: Start:  04/13/24            Goal: LTG-By discharge, patient will ambulate with FWW 50 ft x 2 indoors  SPV-mod I        Dates: Start:  04/13/24            Goal: LTG-By discharge, patient will transfer one surface to another mod I with FWW       Dates: Start:  04/13/24

## 2024-04-13 NOTE — ASSESSMENT & PLAN NOTE
Bun: 46 --> 38 --> 38 --> 31 --> 33  Encouraging fluid intake  Continue to hold Lasix, Aldactone  Cont to monitor

## 2024-04-13 NOTE — ASSESSMENT & PLAN NOTE
Folate testing no longer performed at this facility   Vit B12 normal  Likely related to hypothyroidism  Follow H/H  Check F/U labs in AM

## 2024-04-13 NOTE — CARE PLAN
"  Problem: Fall Risk - Rehab  Goal: Patient will remain free from falls  Outcome: Progressing   Renetta Chauhan Fall risk Assessment Score: 14    Moderate fall risk Interventions  - Bed and strip alarm   - Yellow sign by the door   - Yellow wrist band \"Fall risk\"  - Room near to the nurse station  - Do not leave patient unattended in the bathroom  - Fall risk education provided          Problem: Psychosocial  Goal: Patient's level of anxiety will decrease  Outcome: Progressing   Patient reported having a high level of anxiety. PRN Xanax given. Medication helped enough for patient to calm down and sleep comfortably.          "

## 2024-04-13 NOTE — FLOWSHEET NOTE
04/13/24 0707   Events/Summary/Plan   Events/Summary/Plan mdi and flutter   Vital Signs   Pulse 85   Respiration 16   Pulse Oximetry 98 %   $ Pulse Oximetry (Spot Check) Yes   Respiratory Assessment   Level of Consciousness Alert   Chest Exam   Work Of Breathing / Effort Within Normal Limits   Breath Sounds   RUL Breath Sounds Diminished   RML Breath Sounds Diminished   RLL Breath Sounds Diminished   DO Breath Sounds Diminished   LLL Breath Sounds Diminished   Secretions   Cough Non Productive;Strong   Oxygen   O2 (LPM) 2   O2 Delivery Device Silicone Nasal Cannula

## 2024-04-13 NOTE — PROGRESS NOTES
NURSING DAILY NOTE    Name: Donte Magaña   Date of Admission: 4/12/2024   Admitting Diagnosis: Acute heart failure (HCC)  Attending Physician: Lisa Mercedes D.o.  Allergies: Amiodarone, Bactrim, Cipro xr, Metoprolol, Morphine, Phytoplex z-guard [petrolatum-zinc oxide], Pseudoephedrine, Qvar [beclomethasone dipropionate], Vibramycin, Atorvastatin calcium-polysorbate 80, Augmentin, Diltiazem, Flecainide, Keflex, Mucinex, Tramadol, Atorvastatin, and Tape    Safety  Patient Assist     Patient Precautions     Precaution Comments     Bed Transfer Status     Toilet Transfer Status      Assistive Devices     Oxygen  Nasal Cannula  Diet/Therapeutic Dining  Current Diet Order   Procedures    Diet Order Diet: Cardiac     Pill Administration  whole  Agitated Behavioral Scale     ABS Level of Severity       Fall Risk  Has the patient had a fall this admission?      Renetta Chauhan Fall Risk Scoring  14, MODERATE RISK  Fall Risk Safety Measures  bed alarm, chair alarm, poor balance, and low vision/ hearing    Vitals  Temperature: 36.6 °C (97.9 °F)  Temp src: Oral  Pulse: 80  Respiration: 18  Blood Pressure : 114/50  Blood Pressure MAP (Calculated): 71 MM HG  BP Location: Right, Upper Arm  Patient BP Position: Supine     Oxygen  Pulse Oximetry: 96 %  O2 (LPM): 2  O2 Delivery Device: Nasal Cannula    Bowel and Bladder  Last Bowel Movement     Stool Type     Bowel Device     Continent  Bladder:     Bowel:    Bladder Function     Genitourinary Assessment        Skin  Ashutosh Score   15  Sensory Interventions      Moisture Interventions  Moisturizers/Barriers: Barrier Paste, Barrier Wipes      Pain  Pain Rating Scale  0 - No Pain  Pain Location     Pain Location Orientation     Pain Interventions   Declines    ADLs    Bathing      Linen Change      Personal Hygiene     Chlorhexidine Bath      Oral Care     Teeth/Dentures     Shave     Nutrition Percentage Eaten      Environmental Precautions     Patient Turns/Positioning  Patient Turns Self from Side to Side  Patient Turns Assistance/Tolerance     Bed Positions     Head of Bed Elevated         Psychosocial/Neurologic Assessment  Psychosocial Assessment     Neurologic Assessment  Level of Consciousness: Alert       Cardio/Pulmonary Assessment  Edema      Respiratory Breath Sounds  RUL Breath Sounds: Expiratory Wheezes, Rhonchi  RML Breath Sounds: Expiratory Wheezes, Rhonchi  RLL Breath Sounds: Expiratory Wheezes, Rhonchi  DO Breath Sounds: Expiratory Wheezes, Rhonchi  LLL Breath Sounds: Expiratory Wheezes, Rhonchi  Cardiac Assessment

## 2024-04-13 NOTE — CARE PLAN
The patient is Stable - Low risk of patient condition declining or worsening    Shift Goals  Clinical Goals: safety  Patient Goals: Rest    Progress made toward(s) clinical / shift goals:  1    Problem: Mobility  Goal: Patient's capacity to carry out activities will improve  Outcome: Progressing: Pt participating in therapies.     Patient is not progressing towards the following goals:    Problem: Risk for Aspiration  Goal: Patient's risk for aspiration will be absent or decrease  Outcome: Not Progressing: Swallow eval ordered after pt choked on a pill this am. Diet down graded to level 6 soft and bite, crush meds in puree,  meds unable to be taken crushed - float whole one at a time.

## 2024-04-13 NOTE — PROGRESS NOTES
"  Physical Medicine & Rehabilitation Progress Note    Encounter Date: 4/13/2024    Chief Complaint: Acute respiratory failure (HCC) [J96.00]    Interval Events (Subjective):  Acute problems/issues reported by staff: choked on a pill but sattomg >90%     Found asleep when rechecking her.       Medications:  Scheduled Medications   Medication Dose Frequency    [START ON 4/14/2024] levothyroxine  37.5 mcg AM ES    montelukast  10 mg Nightly    phosphorus  500 mg TID    Pharmacy Consult Request  1 Each PHARMACY TO DOSE    atenolol  50 mg Q EVENING    azithromycin  500 mg DAILY    Dextromethorphan Polistirex ER  60 mg BID    fluticasone-umeclidinium-vilanterol  1 Puff QDAILY (RT)    mirtazapine  15 mg QHS    nystatin   BID    rivaroxaban  15 mg PM MEAL    sertraline  50 mg DAILY    vibegron  75 mg DAILY    predniSONE  40 mg DAILY     PRN medications: Respiratory Therapy Consult, hydrALAZINE, carboxymethylcellulose, benzocaine-menthol, traZODone, sodium chloride, acetaminophen, ALPRAZolam, benzonatate, ipratropium-albuterol, menthol    Diet:  Current Diet Order   Procedures    Diet Order Diet: Level 6 - Soft and Bite Sized (cruch meds that can be crushe in puree. float others one at a time); Liquid level: Level 0 - Thin       Objective:  VITAL SIGNS: /67   Pulse 80   Temp 36.4 °C (97.5 °F) (Oral)   Resp 20   Ht 1.626 m (5' 4\")   Wt 77.1 kg (170 lb 1 oz)   LMP 01/10/1975   SpO2 97%   BMI 29.19 kg/m²     General:  No acute distress.     HEENT: normocephalic, atraumatic, sclera and conjunctiva normal, normal neck ROM   Respiratory:  non-labored respirations. full sentences. clear to auscultation bilaterally   Cardiovascular:  Regular rate and rhythm. peripheral perfusion normal   Abdomen: soft, non-tender, non distended, bowel sounds normal   Extremities: no edema; no bilateral calf pain.    Mental status/ Cognition:  Alert. Appropriate responses    Psychiatric: normal affect.  Cooperative.        Laboratory " Values:  Recent Results (from the past 72 hour(s))   Comp Metabolic Panel    Collection Time: 04/10/24  1:55 PM   Result Value Ref Range    Sodium 138 135 - 145 mmol/L    Potassium 4.2 3.6 - 5.5 mmol/L    Chloride 99 96 - 112 mmol/L    Co2 23 20 - 33 mmol/L    Anion Gap 16.0 7.0 - 16.0    Glucose 120 (H) 65 - 99 mg/dL    Bun 37 (H) 8 - 22 mg/dL    Creatinine 1.78 (H) 0.50 - 1.40 mg/dL    Calcium 9.4 8.5 - 10.5 mg/dL    Correct Calcium 9.4 8.5 - 10.5 mg/dL    AST(SGOT) 19 12 - 45 U/L    ALT(SGPT) 12 2 - 50 U/L    Alkaline Phosphatase 78 30 - 99 U/L    Total Bilirubin 0.3 0.1 - 1.5 mg/dL    Albumin 4.0 3.2 - 4.9 g/dL    Total Protein 7.1 6.0 - 8.2 g/dL    Globulin 3.1 1.9 - 3.5 g/dL    A-G Ratio 1.3 g/dL   MAGNESIUM    Collection Time: 04/10/24  1:55 PM   Result Value Ref Range    Magnesium 2.1 1.5 - 2.5 mg/dL   PHOSPHORUS    Collection Time: 04/10/24  1:55 PM   Result Value Ref Range    Phosphorus 3.5 2.5 - 4.5 mg/dL   CRP QUANTITIVE (NON-CARDIAC)    Collection Time: 04/10/24  1:55 PM   Result Value Ref Range    Stat C-Reactive Protein 5.81 (H) 0.00 - 0.75 mg/dL   PROCALCITONIN    Collection Time: 04/10/24  1:55 PM   Result Value Ref Range    Procalcitonin 0.19 <0.25 ng/mL   ESTIMATED GFR    Collection Time: 04/10/24  1:55 PM   Result Value Ref Range    GFR (CKD-EPI) 28 (A) >60 mL/min/1.73 m 2   Renal Function Panel    Collection Time: 04/11/24 12:38 AM   Result Value Ref Range    Sodium 136 135 - 145 mmol/L    Potassium 3.9 3.6 - 5.5 mmol/L    Chloride 98 96 - 112 mmol/L    Co2 25 20 - 33 mmol/L    Glucose 110 (H) 65 - 99 mg/dL    Creatinine 1.79 (H) 0.50 - 1.40 mg/dL    Bun 46 (H) 8 - 22 mg/dL    Calcium 9.3 8.5 - 10.5 mg/dL    Correct Calcium 9.5 8.5 - 10.5 mg/dL    Phosphorus 4.3 2.5 - 4.5 mg/dL    Albumin 3.7 3.2 - 4.9 g/dL   MAGNESIUM    Collection Time: 04/11/24 12:38 AM   Result Value Ref Range    Magnesium 2.0 1.5 - 2.5 mg/dL   CBC WITH DIFFERENTIAL    Collection Time: 04/11/24 12:38 AM   Result Value Ref  Range    WBC 10.4 4.8 - 10.8 K/uL    RBC 4.05 (L) 4.20 - 5.40 M/uL    Hemoglobin 13.3 12.0 - 16.0 g/dL    Hematocrit 40.2 37.0 - 47.0 %    MCV 99.3 (H) 81.4 - 97.8 fL    MCH 32.8 27.0 - 33.0 pg    MCHC 33.1 32.2 - 35.5 g/dL    RDW 49.0 35.9 - 50.0 fL    Platelet Count 274 164 - 446 K/uL    MPV 9.8 9.0 - 12.9 fL    Neutrophils-Polys 57.70 44.00 - 72.00 %    Lymphocytes 26.40 22.00 - 41.00 %    Monocytes 9.20 0.00 - 13.40 %    Eosinophils 5.60 0.00 - 6.90 %    Basophils 0.40 0.00 - 1.80 %    Immature Granulocytes 0.70 0.00 - 0.90 %    Nucleated RBC 0.00 0.00 - 0.20 /100 WBC    Neutrophils (Absolute) 6.00 1.82 - 7.42 K/uL    Lymphs (Absolute) 2.74 1.00 - 4.80 K/uL    Monos (Absolute) 0.96 (H) 0.00 - 0.85 K/uL    Eos (Absolute) 0.58 (H) 0.00 - 0.51 K/uL    Baso (Absolute) 0.04 0.00 - 0.12 K/uL    Immature Granulocytes (abs) 0.07 0.00 - 0.11 K/uL    NRBC (Absolute) 0.00 K/uL   ESTIMATED GFR    Collection Time: 04/11/24 12:38 AM   Result Value Ref Range    GFR (CKD-EPI) 27 (A) >60 mL/min/1.73 m 2   Renal Function Panel    Collection Time: 04/11/24 10:15 AM   Result Value Ref Range    Sodium 137 135 - 145 mmol/L    Potassium 4.4 3.6 - 5.5 mmol/L    Chloride 98 96 - 112 mmol/L    Co2 26 20 - 33 mmol/L    Glucose 138 (H) 65 - 99 mg/dL    Creatinine 1.72 (H) 0.50 - 1.40 mg/dL    Bun 46 (H) 8 - 22 mg/dL    Calcium 9.5 8.5 - 10.5 mg/dL    Correct Calcium 9.7 8.5 - 10.5 mg/dL    Phosphorus 4.6 (H) 2.5 - 4.5 mg/dL    Albumin 3.8 3.2 - 4.9 g/dL   ESTIMATED GFR    Collection Time: 04/11/24 10:15 AM   Result Value Ref Range    GFR (CKD-EPI) 29 (A) >60 mL/min/1.73 m 2   Renal Function Panel    Collection Time: 04/11/24  2:29 PM   Result Value Ref Range    Sodium 135 135 - 145 mmol/L    Potassium 4.6 3.6 - 5.5 mmol/L    Chloride 98 96 - 112 mmol/L    Co2 22 20 - 33 mmol/L    Glucose 115 (H) 65 - 99 mg/dL    Creatinine 1.62 (H) 0.50 - 1.40 mg/dL    Bun 41 (H) 8 - 22 mg/dL    Calcium 9.4 8.5 - 10.5 mg/dL    Correct Calcium 9.5 8.5 -  10.5 mg/dL    Phosphorus 4.1 2.5 - 4.5 mg/dL    Albumin 3.9 3.2 - 4.9 g/dL   ESTIMATED GFR    Collection Time: 04/11/24  2:29 PM   Result Value Ref Range    GFR (CKD-EPI) 31 (A) >60 mL/min/1.73 m 2   CoV-2, Flu A/B, And RSV by PCR ([facility])    Collection Time: 04/12/24  2:40 AM    Specimen: Nasopharyngeal; Respirate   Result Value Ref Range    Influenza virus A RNA Negative Negative    Influenza virus B, PCR Negative Negative    RSV, PCR Negative Negative    SARS-CoV-2 by PCR NotDetected     SARS-CoV-2 Source 1    MAGNESIUM    Collection Time: 04/12/24  8:28 AM   Result Value Ref Range    Magnesium 2.1 1.5 - 2.5 mg/dL   Comp Metabolic Panel    Collection Time: 04/12/24  8:28 AM   Result Value Ref Range    Sodium 136 135 - 145 mmol/L    Potassium 4.9 3.6 - 5.5 mmol/L    Chloride 100 96 - 112 mmol/L    Co2 27 20 - 33 mmol/L    Anion Gap 9.0 7.0 - 16.0    Glucose 121 (H) 65 - 99 mg/dL    Bun 45 (H) 8 - 22 mg/dL    Creatinine 1.37 0.50 - 1.40 mg/dL    Calcium 9.6 8.5 - 10.5 mg/dL    Correct Calcium 9.9 8.5 - 10.5 mg/dL    AST(SGOT) 14 12 - 45 U/L    ALT(SGPT) 12 2 - 50 U/L    Alkaline Phosphatase 75 30 - 99 U/L    Total Bilirubin 0.5 0.1 - 1.5 mg/dL    Albumin 3.6 3.2 - 4.9 g/dL    Total Protein 7.1 6.0 - 8.2 g/dL    Globulin 3.5 1.9 - 3.5 g/dL    A-G Ratio 1.0 g/dL   CBC WITH DIFFERENTIAL    Collection Time: 04/12/24  8:28 AM   Result Value Ref Range    WBC 10.2 4.8 - 10.8 K/uL    RBC 4.26 4.20 - 5.40 M/uL    Hemoglobin 13.7 12.0 - 16.0 g/dL    Hematocrit 43.4 37.0 - 47.0 %    .9 (H) 81.4 - 97.8 fL    MCH 32.2 27.0 - 33.0 pg    MCHC 31.6 (L) 32.2 - 35.5 g/dL    RDW 51.3 (H) 35.9 - 50.0 fL    Platelet Count 309 164 - 446 K/uL    MPV 9.9 9.0 - 12.9 fL    Neutrophils-Polys 65.10 44.00 - 72.00 %    Lymphocytes 18.80 (L) 22.00 - 41.00 %    Monocytes 10.50 0.00 - 13.40 %    Eosinophils 4.10 0.00 - 6.90 %    Basophils 0.60 0.00 - 1.80 %    Immature Granulocytes 0.90 0.00 - 0.90 %    Nucleated RBC 0.00 0.00 - 0.20 /100  WBC    Neutrophils (Absolute) 6.66 1.82 - 7.42 K/uL    Lymphs (Absolute) 1.93 1.00 - 4.80 K/uL    Monos (Absolute) 1.08 (H) 0.00 - 0.85 K/uL    Eos (Absolute) 0.42 0.00 - 0.51 K/uL    Baso (Absolute) 0.06 0.00 - 0.12 K/uL    Immature Granulocytes (abs) 0.09 0.00 - 0.11 K/uL    NRBC (Absolute) 0.00 K/uL   PROCALCITONIN    Collection Time: 04/12/24  8:28 AM   Result Value Ref Range    Procalcitonin 0.13 <0.25 ng/mL   ESTIMATED GFR    Collection Time: 04/12/24  8:28 AM   Result Value Ref Range    GFR (CKD-EPI) 38 (A) >60 mL/min/1.73 m 2   proBrain Natriuretic Peptide, NT    Collection Time: 04/12/24  8:28 AM   Result Value Ref Range    NT-proBNP 622 (H) 0 - 125 pg/mL   POCT glucose device results    Collection Time: 04/12/24 11:35 AM   Result Value Ref Range    POC Glucose, Blood 144 (H) 65 - 99 mg/dL   CBC with Differential    Collection Time: 04/13/24  6:15 AM   Result Value Ref Range    WBC 8.0 4.8 - 10.8 K/uL    RBC 4.24 4.20 - 5.40 M/uL    Hemoglobin 13.6 12.0 - 16.0 g/dL    Hematocrit 42.7 37.0 - 47.0 %    .7 (H) 81.4 - 97.8 fL    MCH 32.1 27.0 - 33.0 pg    MCHC 31.9 (L) 32.2 - 35.5 g/dL    RDW 49.5 35.9 - 50.0 fL    Platelet Count 283 164 - 446 K/uL    MPV 9.9 9.0 - 12.9 fL    Neutrophils-Polys 52.40 44.00 - 72.00 %    Lymphocytes 29.00 22.00 - 41.00 %    Monocytes 11.10 0.00 - 13.40 %    Eosinophils 6.10 0.00 - 6.90 %    Basophils 0.70 0.00 - 1.80 %    Immature Granulocytes 0.70 0.00 - 0.90 %    Nucleated RBC 0.00 0.00 - 0.20 /100 WBC    Neutrophils (Absolute) 4.19 1.82 - 7.42 K/uL    Lymphs (Absolute) 2.32 1.00 - 4.80 K/uL    Monos (Absolute) 0.89 (H) 0.00 - 0.85 K/uL    Eos (Absolute) 0.49 0.00 - 0.51 K/uL    Baso (Absolute) 0.06 0.00 - 0.12 K/uL    Immature Granulocytes (abs) 0.06 0.00 - 0.11 K/uL    NRBC (Absolute) 0.00 K/uL   Comp Metabolic Panel (CMP)    Collection Time: 04/13/24  6:15 AM   Result Value Ref Range    Sodium 137 135 - 145 mmol/L    Potassium 4.6 3.6 - 5.5 mmol/L    Chloride 102 96 -  112 mmol/L    Co2 25 20 - 33 mmol/L    Anion Gap 10.0 7.0 - 16.0    Glucose 96 65 - 99 mg/dL    Bun 38 (H) 8 - 22 mg/dL    Creatinine 1.07 0.50 - 1.40 mg/dL    Calcium 9.3 8.5 - 10.5 mg/dL    Correct Calcium 9.8 8.5 - 10.5 mg/dL    AST(SGOT) 16 12 - 45 U/L    ALT(SGPT) 7 2 - 50 U/L    Alkaline Phosphatase 69 30 - 99 U/L    Total Bilirubin 0.5 0.1 - 1.5 mg/dL    Albumin 3.4 3.2 - 4.9 g/dL    Total Protein 6.5 6.0 - 8.2 g/dL    Globulin 3.1 1.9 - 3.5 g/dL    A-G Ratio 1.1 g/dL   proBrain Natriuretic Peptide, NT    Collection Time: 24  6:15 AM   Result Value Ref Range    NT-proBNP 1233 (H) 0 - 125 pg/mL   Magnesium    Collection Time: 24  6:15 AM   Result Value Ref Range    Magnesium 2.0 1.5 - 2.5 mg/dL   Phosphorus    Collection Time: 24  6:15 AM   Result Value Ref Range    Phosphorus 2.1 (L) 2.5 - 4.5 mg/dL   TSH with Reflex to FT4    Collection Time: 24  6:15 AM   Result Value Ref Range    TSH 0.180 (L) 0.380 - 5.330 uIU/mL   Vitamin D, 25-hydroxy (blood)    Collection Time: 24  6:15 AM   Result Value Ref Range    25-Hydroxy   Vitamin D 25 63 30 - 100 ng/mL   ESTIMATED GFR    Collection Time: 24  6:15 AM   Result Value Ref Range    GFR (CKD-EPI) 51 (A) >60 mL/min/1.73 m 2   FREE THYROXINE    Collection Time: 24  6:15 AM   Result Value Ref Range    Free T-4 1.53 0.93 - 1.70 ng/dL       Imagin24 chest x-ray: No obvious abnormalities and similar to chest x-ray 4 2024  / neck x-ray: No obvious soft tissue abnormalities    Medical Decision Making and Plan:  Reviewed primary care team's plan.  Followed by hospitalist    Rehab - Functional status reviewed briefly and progressing towards goals. Cont therapy plan     Pain - will monitor pain levels and adjust medications accordingly     Neuro -  monitor for neurological deficits and continue management plan of primary care team     Ortho -  continue management plan of primary care team to include pain management  plan     Cardiac/Pulm - monitor clinical presentation and vitals; continue management plan of primary care team   Acute heart failure with preserved ejection fraction  PT and OT for mobility and ADLs. Per guidelines, 15 hours per week between PT, OT and/or SLP.  Follow-up cardiology  Prior to admission: Lasix 60 mg IV twice daily, lisinopril 5 mg daily, Aldactone 25 mg daily on HOLD due to hypotension from aggressive diuresis s/p gentle fluids  Hospitalist consult by primary team  4/13 BNP elevated 1233 FROM 622 and will discuss with hospitalist this can be secondary to pulmonary diseases, illness, and cardiac conditions     Acute hypoxemic respiratory failure secondary to multifocal pneumonia seen on CT  Procalcitonin, WBC within normal limits.  Being treated with Zithromax through 4/14 for possible atypical pneumonia  RT to consult  Encourage IS  Pep therapy  DuoNeb as needed  Continue Delsym  Continue Trelegy Ellipta  Per DR Mercedes notes: Pulm recommendation just prior to rehab admission is for short course of prednisone 40 mg daily for 5 days.  Initiating 4/13.  Discussed with pulmonary, patient's unspecified allergy/reaction to beclomethasone nonissue with starting prednisone. Discussed with patient as well. Patient states has many issues to many medications. None known to prednisone. Continuing with prescription with patient and family permission as this recommendation per Pulm was discussed with patient's POA Reina.   4/13 near choking event, chest x-ray and neck x-ray did not reveal acute abnormalities; no additional oxygen required at this time; placed speech-language referral    FEN  4/13 Increased BUN to 38; will recheck    Endo  4/13 low TSH and normal free T4; could be in the setting of nonthyroidal illness and/or due to prednisone and may require evaluation post hospitalization    Diet  4/13 upgraded to soft and bite-size and thin liquids ; will have speech therapy recheck though it was near aspiration  with a pill    GI -will monitor bowel movements and adjust medications accordingly     - will monitor voiding and adjust accordingly    DVT px - cont prophylaxis     Dispo - per primary team       I, Alfred Gonsalves DO, personally performed a complete drug regimen review and no potential clinically significant medication issues were identified.  ____________________________________    Alfred Gonsalves DO  Physical Medicine and Rehabilitation  [facility]  ____________________________________

## 2024-04-13 NOTE — PROGRESS NOTES
"Pt choked on a pill at this am. Concern for airway obstruction as pt nodded \"no\" when asked if she could breathe. RR called at 10:04. Pt able to dislodge pill, strong cough present. Pt assessed by RT, Hospitalist, and Physiatrist.   Orders for swallow eval, cognitive/linguistic/speech ordered, as well as CXR and neck X-ray. Diet downgraded to soft and bite sized, meds crushed in puree, others that can not be crush, take 1 at a time in puree.   "

## 2024-04-13 NOTE — ASSESSMENT & PLAN NOTE
TSH: 0.18  FT4: 1.53  On Synthroid -- dose decreased recently by Dr. Crowell  Needs outpatient follow up

## 2024-04-13 NOTE — PROGRESS NOTES
NURSING DAILY NOTE    Name: Donte Magaña   Date of Admission: 4/12/2024   Admitting Diagnosis: Acute diastolic heart failure (HCC)  Attending Physician: Lisa Mercedes D.o.  Allergies: Amiodarone, Bactrim, Cipro xr, Metoprolol, Morphine, Phytoplex z-guard [petrolatum-zinc oxide], Pseudoephedrine, Qvar [beclomethasone dipropionate], Vibramycin, Atorvastatin calcium-polysorbate 80, Augmentin, Diltiazem, Flecainide, Keflex, Mucinex, Tramadol, Atorvastatin, and Tape    Safety  Patient Assist     Patient Precautions  Fall Risk, Pacemaker Precautions  Precaution Comments  Pacemaker 2 years old per pt report  Bed Transfer Status  Moderate Assist  Toilet Transfer Status   Contact Guard Assist  Assistive Devices     Oxygen  Nasal Cannula  Diet/Therapeutic Dining  Current Diet Order   Procedures    Diet Order Diet: Level 6 - Soft and Bite Sized (cruch meds that can be crushe in puree. float others one at a time); Liquid level: Level 0 - Thin     Pill Administration  crushed, whole, floated, and one at a time if med unable to be taken crushed  Agitated Behavioral Scale     ABS Level of Severity       Fall Risk  Has the patient had a fall this admission?   No  Renetta Chauhan Fall Risk Scoring  14, MODERATE RISK  Fall Risk Safety Measures  bed alarm, chair alarm, poor balance, and low vision/ hearing    Vitals  Temperature: 36.4 °C (97.5 °F)  Temp src: Oral  Pulse: 80  Respiration: 20  Blood Pressure : 132/67  Blood Pressure MAP (Calculated): 89 MM HG  BP Location: Right, Upper Arm  Patient BP Position: Sitting     Oxygen  Pulse Oximetry: 97 %  O2 (LPM): 2  O2 Delivery Device: Nasal Cannula    Bowel and Bladder  Last Bowel Movement  04/09/24  Stool Type     Bowel Device     Continent  Bladder:     Bowel:    Bladder Function  Urine Clarity: Clear  Urine Odor: Malodorous  Number of Times Incontinent of Urine: 0  Straight Catheter: 450 ml  Genitourinary Assessment    Bladder Assessment (WDL):  WDL Except  Urine Clarity: Clear  Urine Odor: Malodorous  Number of Bladder Accidents: 0  Total Number of Bladder of Accidents in Last 7 Days: 0  Number of Times Incontinent of Urine: 0  Bladder Device: Other (Comment) (ICP)  Bladder Scan: Unable To Void  $ Bladder Scan Results (mL): 66    Skin  Ashutosh Score   16  Sensory Interventions      Moisture Interventions  Moisturizers/Barriers: Barrier Paste      Pain  Pain Rating Scale  0 - No Pain  Pain Location     Pain Location Orientation     Pain Interventions   Declines    ADLs    Bathing      Linen Change      Personal Hygiene     Chlorhexidine Bath      Oral Care     Teeth/Dentures     Shave     Nutrition Percentage Eaten  Lunch, Less than 25% Consumed  Environmental Precautions     Patient Turns/Positioning  Patient Turns Self from Side to Side  Patient Turns Assistance/Tolerance     Bed Positions     Head of Bed Elevated         Psychosocial/Neurologic Assessment  Psychosocial Assessment  Psychosocial (WDL):  Within Defined Limits  Patient Behaviors: Anxious, Crying  Neurologic Assessment  Neuro (WDL): Exceptions to WDL  Level of Consciousness: Alert  Orientation Level: Oriented to person, Oriented to situation, Disoriented to place, Disoriented to time  Cognition: Confused in conversation  Speech: Delayed responses  Pupil Assesment: Yes  R Pupil Size (mm): 3  R Pupil Shape / Description: Round  R Pupil Reaction: Brisk  L Pupil Size (mm): 3  L Pupil Shape / Description: Round  L Pupil Reaction: Brisk  EENT (WDL):  WDL Except    Cardio/Pulmonary Assessment  Edema      Respiratory Breath Sounds  RUL Breath Sounds: Diminished  RML Breath Sounds: Diminished  RLL Breath Sounds: Diminished  DO Breath Sounds: Diminished  LLL Breath Sounds: Diminished  Cardiac Assessment   Cardiac (WDL):  WDL Except (A Fib, HTN.)

## 2024-04-13 NOTE — PROGRESS NOTES
NURSING DAILY NOTE    Name: Donte Magaña   Date of Admission: 4/12/2024   Admitting Diagnosis: Acute diastolic heart failure (HCC)  Attending Physician: Lisa Mercedes D.o.  Allergies: Amiodarone, Bactrim, Cipro xr, Metoprolol, Morphine, Phytoplex z-guard [petrolatum-zinc oxide], Pseudoephedrine, Qvar [beclomethasone dipropionate], Vibramycin, Atorvastatin calcium-polysorbate 80, Augmentin, Diltiazem, Flecainide, Keflex, Mucinex, Tramadol, Atorvastatin, and Tape    Safety  Patient Assist     Patient Precautions     Precaution Comments     Bed Transfer Status     Toilet Transfer Status      Assistive Devices     Oxygen  Nasal Cannula  Diet/Therapeutic Dining  Current Diet Order   Procedures    Diet Order Diet: Cardiac     Pill Administration  whole  Agitated Behavioral Scale     ABS Level of Severity       Fall Risk  Has the patient had a fall this admission?      Renetta Chauhan Fall Risk Scoring  14, MODERATE RISK  Fall Risk Safety Measures  bed alarm and chair alarm    Vitals  Temperature: 36.9 °C (98.4 °F)  Temp src: Oral  Pulse: 66  Respiration: 18  Blood Pressure : 122/68  Blood Pressure MAP (Calculated): 86 MM HG  BP Location: Right, Upper Arm  Patient BP Position: Supine     Oxygen  Pulse Oximetry: 97 %  O2 (LPM): 2  O2 Delivery Device: Nasal Cannula    Bowel and Bladder  Last Bowel Movement  04/09/24  Stool Type     Bowel Device     Continent  Bladder:     Bowel:    Bladder Function  Urine Clarity: Clear  Urine Odor: Malodorous  Number of Times Incontinent of Urine: 0  Straight Catheter: 450 ml  Genitourinary Assessment   Bladder Assessment (WDL):  WDL Except  Urine Clarity: Clear  Urine Odor: Malodorous  Number of Bladder Accidents: 0  Total Number of Bladder of Accidents in Last 7 Days: 0  Number of Times Incontinent of Urine: 0  Bladder Device: Other (Comment) (ICP)  Bladder Scan: Unable To Void  $ Bladder Scan Results (mL): 66    Skin  Ashutosh  Score   15  Sensory Interventions      Moisture Interventions  Moisturizers/Barriers: Barrier Paste, Barrier Wipes      Pain  Pain Rating Scale  0 - No Pain  Pain Location     Pain Location Orientation     Pain Interventions   Declines    ADLs    Bathing      Linen Change      Personal Hygiene     Chlorhexidine Bath      Oral Care     Teeth/Dentures     Shave     Nutrition Percentage Eaten     Environmental Precautions     Patient Turns/Positioning  Patient Turns Self from Side to Side  Patient Turns Assistance/Tolerance     Bed Positions     Head of Bed Elevated         Psychosocial/Neurologic Assessment  Psychosocial Assessment  Psychosocial (WDL):  WDL Except  Patient Behaviors: Anxious, Crying  Neurologic Assessment  Neuro (WDL): Exceptions to WDL  Level of Consciousness: Alert  Orientation Level: Oriented to person, Oriented to situation, Disoriented to place, Disoriented to time  Cognition: Confused in conversation  Speech: Delayed responses  Pupil Assesment: Yes  R Pupil Size (mm): 3  R Pupil Shape / Description: Round  R Pupil Reaction: Brisk  L Pupil Size (mm): 3  L Pupil Shape / Description: Round  L Pupil Reaction: Brisk  EENT (WDL):  WDL Except    Cardio/Pulmonary Assessment  Edema      Respiratory Breath Sounds  RUL Breath Sounds: Expiratory Wheezes, Rhonchi  RML Breath Sounds: Expiratory Wheezes, Rhonchi  RLL Breath Sounds: Expiratory Wheezes, Rhonchi  DO Breath Sounds: Expiratory Wheezes, Rhonchi  LLL Breath Sounds: Expiratory Wheezes, Rhonchi  Cardiac Assessment   Cardiac (WDL):  WDL Except (A Fib, HTN.)

## 2024-04-13 NOTE — ASSESSMENT & PLAN NOTE
Resolved (from [facility] – [place], [place])  CT bilateral ground glass opacities, negative for PE  Echo: EF 60%, grade II diastolic dysfunction, mild MR/mod AI  BNP: 622 --> 1233 --> 1144 (4/14) --> 529 (4/21)  CXR: negative acute  Soft tissue neck X-ray: unremarkable  S/P Azithromycin  S/P Prednisone  On Trelegy, Singulair  Note: Lasix & Aldactone on hold 2nd to azotemia  Resp & O2 protocols as needed

## 2024-04-13 NOTE — THERAPY
Occupational Therapy   Initial Evaluation     Patient Name: Donte Magaña  Age:  83 y.o., Sex:  female  Medical Record #: [id number]  Today's Date: 4/13/2024     Subjective    Pt seen 2x this day and agreeable to both sessions. Pt lethargic at times during both sessions as well.     Objective       04/13/24 0701 04/13/24 0720 04/13/24 0750   OT Charge Group   Charges Yes  --   --    OT Self Care / ADL (Units) 1  --   --    OT Evaluation OT Evaluation Low  --   --    OT Total Time Spent   OT Individual Total Time Spent (Mins) 60  --   --    Prior Living Situation   Prior Services Housekeeping / Homemaker Services;Home-Independent  --   --    Housing / Facility Assisted Living Residence  --   --    Steps Into Home 0  --   --    Steps In Home 0  --   --    Elevator Yes  --   --    Bathroom Set up Walk In Shower;Shower Curtain;Grab Bars;Built-In Shower Chair  --   --    Equipment Owned Scooter;Tub / Shower Seat;Grab Bar(s) In Tub / Shower;Grab Bar(s) By Toilet  --   --    Lives with - Patient's Self Care Capacity Alone and Able to Care For Self  --   --    Comments Pt lives in correction with IADL services only. Dtr nearby who completes meds, finances, and driving/shopping.  --   --    Prior Level of ADL Function   Self Feeding Independent  --   --    Grooming / Hygiene Independent  --   --    Bathing Independent  --   --    Dressing Independent  --   --    Toileting Independent  --   --    Prior Level of IADL Function   Medication Management Requires Assist  --   --    Laundry Dependent  --   --    Kitchen Mobility Dependent  --   --    Finances Dependent  --   --    Home Management Dependent  --   --    Shopping Dependent  --   --    Prior Level Of Mobility Independent With Device in Community;Unable to Negotiate Steps in Community;Independent With Device in Home;Unable to Negotiate Steps in Home  --   --    Driving / Transportation Relatives / Others Provide Transportation  --   --    Occupation (Pre-Hospital Vocational)  "Retired Due To Age  --   --    Prior Functioning: Everyday Activities   Self Care Independent  --   --    Indoor Mobility (Ambulation) Independent  --   --    Stairs Not applicable  --   --    Functional Cognition Needed some help  --   --    Prior Device Use None of the given options  (Scooter)  --   --    Vitals   Pulse  --  87  --    Patient BP Position Supine Sitting  --    Blood Pressure  134/76 (!) 148/74  --    Pulse Oximetry  --  97 % 88 %   O2 (LPM) 2 1 1   O2 Delivery Device Silicone Nasal Cannula Silicone Nasal Cannula Silicone Nasal Cannula   Vitals Comments  --  Per RT, to trial 1L O2 for weaning. Pt placed back on 2L O2 stating at 97%.   Pain   Intervention Declines  --   --    Cognition    Cognition / Consciousness X  --   --    Speech/ Communication Delayed Responses  (delayed processing)  --   --    Orientation Level Not Oriented to Day  --   --    Level of Consciousness Alert  --   --    Ability To Follow Commands 2 Step  --   --    Safety Awareness Impaired  --   --    Attention Impaired  --   --    Sequencing Impaired  --   --    Initiation Impaired  --   --    Comments Cues for initiation and sequencing with dressing and bathing skills. Lethargic and completing many tasks with eyes closed due to fatigue.  --   --    ABS (Agitated Behavior Scale)   Agitated Behavior Scale Performed No  --   --    Cognitive Pattern Assessment   Cognitive Pattern Assessment Used BIMS  --   --    Brief Interview for Mental Status (BIMS)   Repetition of Three Words (First Attempt) 3  --   --    Temporal Orientation: Year Correct  --   --    Temporal Orientation: Month Accurate within 5 days  --   --    Temporal Orientation: Day No answer  --   --    Recall: \"Sock\" No, could not recall  --   --    Recall: \"Blue\" Yes, after cueing (\"a color\")  --   --    Recall: \"Bed\" No, could not recall  --   --    BIMS Summary Score 9  --   --    Confusion Assessment Method (CAM)   Is there evidence of an acute change in mental " status from the patient's baseline? Yes  --   --    Inattention Behavior present, fluctuates (comes and goes, changes in severity)  --   --    Disorganized thinking Behavior not present  --   --    Altered level of consciousness Behavior present, fluctuates (comes and goes, changes in severity)  --   --    Vision Screen   Vision Not tested  (Pt reports no changes in vision since admission. Wears bifocals.)  --   --    Passive ROM Upper Body   Passive ROM Upper Body WDL  --   --    Comments BUES  --   --    Active ROM Upper Body   Active ROM Upper Body  WDL  --   --    Dominant Hand Right  --   --    Comments BUES  --   --    Strength Upper Body   Upper Body Strength  WDL  --   --    Comments Grossly 4+/5 bilaterally  --   --    Sensation Upper Body   Upper Extremity Sensation  WDL  --   --    Comments Pt reports no changes in sensation  --   --    Upper Body Muscle Tone   Upper Body Muscle Tone  WDL  --   --    Comments BUES  --   --    Balance Assessment   Sitting Balance (Static) Fair +  --   --    Sitting Balance (Dynamic) Fair +  --   --    Standing Balance (Static) Fair -  --   --    Standing Balance (Dynamic) Poor +  --   --    Weight Shift Sitting Fair  --   --    Weight Shift Standing Fair  --   --    Bed Mobility    Supine to Sit Minimal Assist  --   --    Sit to Stand Minimal Assist  --   --    Scooting Standby Assist  --   --    Rolling Standby Assist  (with bed rail to R-side)  --   --    Coordination Upper Body   Coordination WDL  --   --    Comments BUES  --   --    Eating   Assistance Needed Independent  --   --    CARE Score - Eating 6  --   --    Eating Discharge Goal   Discharge Goal 6  --   --    Oral Hygiene Discharge Goal   Discharge Goal 6  --   --    Shower/Bathe Self   Assistance Needed Physical assistance  --   --    Physical Assistance Level 25% or less  --   --    CARE Score - Shower/Bathe Self 3  --   --    Shower/Bathe Self Discharge Goal   Discharge Goal 6  --   --    Upper Body  Dressing   Assistance Needed Set-up / clean-up  --   --    CARE Score - Upper Body Dressing 5  --   --    Upper Body Dressing Discharge Goal   Discharge Goal 6  --   --    Lower Body Dressing   Assistance Needed Physical assistance  --   --    Physical Assistance Level 25% or less  --   --    CARE Score - Lower Body Dressing 3  --   --    Lower Body Dressing Discharge Goal   Discharge Goal 6  --   --    Putting On/Taking Off Footwear   Assistance Needed Physical assistance  --   --    Physical Assistance Level 26%-50%  --   --    CARE Score - Putting On/Taking Off Footwear 3  --   --    Putting On/Taking Off Footwear Discharge Goal   Discharge Goal 6  --   --    Toileting Hygiene   Assistance Needed Physical assistance  --   --    Physical Assistance Level 26%-50%  --   --    CARE Score - Toileting Hygiene 3  --   --    Toileting Hygiene Discharge Goal   Discharge Goal 6  --   --    Toilet Transfer   Assistance Needed Incidental touching  --   --    CARE Score - Toilet Transfer 4  --   --    Toilet Transfer Discharge Goal   Discharge Goal 6  --   --    Hearing, Speech, and Vision   Ability to Hear Adequate  --   --    Ability to See in Adequate Light Adequate  --   --    Expression of Ideas and Wants Some difficulty  --   --    Understanding Verbal and Non-Verbal Content Understands  --   --    Functional Level of Assist   Eating Independent  --   --    Bathing Minimal Assist  --   --    Bathing Description Tub bench;Assit with back;Increased time;Initial preparation for task;Set-up of equipment;Supervision for safety;Verbal cueing;Grab bar;Hand held shower  (Min A for sit<>standing from fold down shower bench to wash rear with GB for UB support. Pt able to reach all remaining parts seated with v/c for initiation of upper then lower body.)  --   --    Upper Body Dressing Supervision  --   --    Upper Body Dressing Description Set-up of equipment  (to don pullover shirt. Cues for O2 line management)  --   --    Lower  Body Dressing Minimal Assist  --   --    Lower Body Dressing Description Increased time;Initial preparation for task;Set-up of equipment;Supervision for safety;Verbal cueing;Grab bar  (Mod A for footwear with donning, pt able to doff. Min A for hip hike of both UW and GB for UB support.)  --   --    Toileting Minimal Assist  --   --    Toileting Description Assist to pull pants up;Grab bar;Increased time;Assist for standing balance;Set-up of equipment;Verbal cueing;Supervision for safety  --   --    Bed, Chair, Wheelchair Transfer Minimal Assist  --   --    Bed Chair Wheelchair Transfer Description Increased time;Initial preparation for task;Set-up of equipment;Supervision for safety;Verbal cueing  (HHA stand shuffle EOB>w/c with cues for hand placement )  --   --    Toilet Transfers Contact Guard Assist  --   --    Toilet Transfer Description Grab bar;Increased time;Supervision for safety;Verbal cueing  --   --    Tub / Shower Transfers Contact Guard Assist  --   --    Tub Shower Transfer Description Grab bar;Shower bench;Increased time;Set-up of equipment;Supervision for safety;Verbal cueing  --   --    Comprehension Modified Independent  --   --    Comprehension Description Glasses  --   --    Expression Independent  --   --    Problem Solving Minimal Assist  --   --    Problem Solving Description Verbal cueing;Supervision;Seat belt;Increased time  --   --    Memory Minimal Assist  --   --    Memory Description Increased time;Bed/chair alarm;Seat belt;Supervision;Verbal cueing  --   --    Problem List   Problem List Decreased Active Daily Living Skills;Decreased Homemaking Skills;Decreased Upper Extremity Strength Right;Decreased Upper Extremity Strength Left;Decreased Functional Mobility;Decreased Activity Tolerance;Safety Awareness Deficits / Cognition;Impaired Postural Control / Balance  --   --    Precautions   Precautions Fall Risk  --   --    Current Discharge Plan   Current Discharge Plan Return to Prior  Living Situation  --   --    Benefit    Therapy Benefit Patient Would Benefit from Inpatient Rehab Occupational Therapy to Maximize [place] with ADLs, IADLs and Functional Mobility.  --   --    Interdisciplinary Plan of Care Collaboration   IDT Collaboration with  Physical Therapist;Nursing  --   --    Patient Position at End of Therapy Seated;Bed Alarm On;Wrist Restraints Applied;Call Light within Reach;Tray Table within Reach;Phone within Reach  --   --    Collaboration Comments re: CLOF  --   --    Strengths & Barriers   Strengths Able to follow instructions;Independent prior level of function;Motivated for self care and independence;Pleasant and cooperative;Willingly participates in therapeutic activities  --   --    Barriers Decreased endurance;Fatigue;Generalized weakness;Hypotension;Impaired activity tolerance;Impaired balance;Impaired carryover of learning;Impaired insight/denial of deficits;Impaired functional cognition;Limited mobility  --   --    Occupational Therapist Assigned   Assigned OT / Treatment Time / Comments MICHAELA 30/60  --   --         04/13/24 8321   OT Charge Group   OT Self Care / ADL (Units) 2   OT Therapy Activity (Units) 2   OT Total Time Spent   OT Individual Total Time Spent (Mins) 60   Vitals   O2 (LPM) 2   O2 Delivery Device Nasal Cannula   Cognition    Attention Impaired   Sequencing Impaired   Initiation Impaired   Comments Mod cues for sequencing novel AE for LBD   Functional Level of Assist   Grooming Supervision;Seated   Grooming Description Increased time;Seated in wheelchair at sink;Supervision for safety  (Seated in w/c for oral care)   Bed, Chair, Wheelchair Transfer Moderate Assist   Bed Chair Wheelchair Transfer Description Increased time;Set-up of equipment;Supervision for safety;Squat pivot transfer to wheelchair   Sitting Upper Body Exercises   Sitting Upper Body Exercises Yes   Upper Extremity Bike Level 3 Resistance  (Motomed for 10 min BUE strength and endurance  training to increase overall activity tolerance for ADLS.)   Interdisciplinary Plan of Care Collaboration   Patient Position at End of Therapy Seated;Chair Alarm On;Self Releasing Lap Belt Applied;Tray Table within Reach;Call Light within Reach     PM session - Block practice with AE (reacher/sock aid), completed 4x with continued need for incidental touching for carryover with both tools when don/doff of socks. Pt did have rapid response earlier in day due to choking on mediation with RN present. Pt now has both swallow and cognition SLP orders for screening.     Patient education: reviewed OT plan of care, rehab expectations, goal setting, ADL needs, orientation to schedule, role of OT in recovery, fall risk and use of call light. Also discussed pacing and sitting out mucus when coughing is productive.     Assessment  Patient is 83 y.o. Female with a diagnosis of Pneumonia and heart failure .  Additional factors influencing patient status / progress (ie: cognitive factors, co-morbidities, social support, etc): Per Dr. Mercedes H&P - Pt who presented to [facility] 4/7/2024 with persistent shortness of breath and cough. She has medical history significant for atrial fibrillation on AC, hypertension, hypothyroidism. She states that over the past week she has had a persistent cough. She initially presented to the emergency room on 4/4 but Neel presented on 4/7. Her viral panel was unremarkable. She was requiring 2 L of supplemental oxygen. CT showed multifocal pneumonia. She was empirically started on IV antibiotics and was admitted for acute hypoxemic respiratory failure. Procalcitonin was not elevated, blood cultures have shown no growth to date. Her BNP was elevated. Her CTA of the chest was negative for PE with only some bilateral groundglass opacities, all infectious markers negative. TTE showed normal LVEF and grade 2 diastolic dysfunction with moderate aortic insufficiency. She was started on Lasix. Diuresis was  ultimately increased and the suspicion for pneumonia being the cause of her symptoms was less likely though she was maintained on antibiotics. Due to diuresis she sustained acute kidney injury due to hypovolemia and was given fluids, her Lasix, lisinopril, Aldactone have been held. Azithromycin was started due to concern for atypical pneumonia. Viral panel was ordered 4/11 and is negative. Her renal function is improving, she does not have a white count. BNP is improving.      OT evaluation performed today; functional performance at today's assessment is as above.  Pt presents to rehab below her normal baseline ind level, currently functioning between Mitchel-SBA for ADLs, and Mitchel-Mod A for functional transfers. Pt is limited by decreased strength, decreased balance, decreased endurance, cognitive deficits, problem solving, and decreased LB strength. Pt lives in a HOUSING FACILITY: Assisted Living Residence with oSTE and staff who can assist 24/7. Pt will benefit from ongoing care from this skilled interdisciplinary high intensity rehabilitation program to address the aforementioned deficits in order to maximize ind with ADLs, IADLs, and household/community mobility while reducing burden of care, supporting a safe return senior care upon DC.      Plan  Recommend Occupational Therapy  minutes per day 5-7 days per week for 10-12 days for the following treatments:  OT Group Therapy, OT Self Care/ADL, OT Cognitive Skill Dev, OT Community Reintegration, OT Manual Ther Technique, OT Neuro Re-Ed/Balance, OT Therapeutic Activity, and OT Therapeutic Exercise.    Passport items to be completed:  Perform bathroom transfers, complete dressing, complete feeding, get ready for the day, prepare a simple meal, participate in household tasks, adapt home for safety needs, demonstrate home exercise program, complete caregiver training     Goals:  Long term and short term goals have been discussed with patient and they are in  agreement.    Occupational Therapy Goals (Active)       Problem: Bathing       Dates: Start:  04/13/24         Goal: STG-Within one week, patient will bathe at SBA level using DME/AE PRN.       Dates: Start:  04/13/24               Problem: Dressing       Dates: Start:  04/13/24         Goal: STG-Within one week, patient will dress LB at CGA level using DME/AE PRN.       Dates: Start:  04/13/24               Problem: Functional Transfers       Dates: Start:  04/13/24         Goal: STG-Within one week, patient will transfer to toilet at SBA level using DME/AE PRN.       Dates: Start:  04/13/24            Goal: STG-Within one week, patient will transfer to step in shower at SBA level using DME/AE PRN.       Dates: Start:  04/13/24               Problem: OT Long Term Goals       Dates: Start:  04/13/24         Goal: LTG-By discharge, patient will complete basic self care tasks at SPV-Mod I level using DME/AE PRN.       Dates: Start:  04/13/24            Goal: LTG-By discharge, patient will perform bathroom transfers at SPV-Mod I level using DME/AE PRN.       Dates: Start:  04/13/24               Problem: Toileting       Dates: Start:  04/13/24         Goal: STG-Within one week, patient will complete toileting tasks at SBA level using DME/AE PRN.        Dates: Start:  04/13/24

## 2024-04-14 ENCOUNTER — APPOINTMENT (OUTPATIENT)
Dept: SPEECH THERAPY | Facility: REHABILITATION | Age: 84
DRG: 291 | End: 2024-04-14
Attending: GENERAL PRACTICE
Payer: MEDICARE

## 2024-04-14 LAB
ANION GAP SERPL CALC-SCNC: 13 MMOL/L (ref 7–16)
BUN SERPL-MCNC: 46 MG/DL (ref 8–22)
CALCIUM SERPL-MCNC: 9.5 MG/DL (ref 8.5–10.5)
CHLORIDE SERPL-SCNC: 100 MMOL/L (ref 96–112)
CO2 SERPL-SCNC: 24 MMOL/L (ref 20–33)
CREAT SERPL-MCNC: 1.04 MG/DL (ref 0.5–1.4)
ERYTHROCYTE [DISTWIDTH] IN BLOOD BY AUTOMATED COUNT: 46.5 FL (ref 35.9–50)
GFR SERPLBLD CREATININE-BSD FMLA CKD-EPI: 53 ML/MIN/1.73 M 2
GLUCOSE SERPL-MCNC: 132 MG/DL (ref 65–99)
HCT VFR BLD AUTO: 40.5 % (ref 37–47)
HGB BLD-MCNC: 13.1 G/DL (ref 12–16)
MCH RBC QN AUTO: 31.7 PG (ref 27–33)
MCHC RBC AUTO-ENTMCNC: 32.3 G/DL (ref 32.2–35.5)
MCV RBC AUTO: 98.1 FL (ref 81.4–97.8)
NT-PROBNP SERPL IA-MCNC: 1144 PG/ML (ref 0–125)
PLATELET # BLD AUTO: 342 K/UL (ref 164–446)
PMV BLD AUTO: 9.9 FL (ref 9–12.9)
POTASSIUM SERPL-SCNC: 4.7 MMOL/L (ref 3.6–5.5)
RBC # BLD AUTO: 4.13 M/UL (ref 4.2–5.4)
SODIUM SERPL-SCNC: 137 MMOL/L (ref 135–145)
VIT B12 SERPL-MCNC: 331 PG/ML (ref 211–911)
WBC # BLD AUTO: 12.1 K/UL (ref 4.8–10.8)

## 2024-04-14 PROCEDURE — 36415 COLL VENOUS BLD VENIPUNCTURE: CPT

## 2024-04-14 PROCEDURE — 82607 VITAMIN B-12: CPT

## 2024-04-14 PROCEDURE — A9270 NON-COVERED ITEM OR SERVICE: HCPCS | Performed by: GENERAL PRACTICE

## 2024-04-14 PROCEDURE — 94640 AIRWAY INHALATION TREATMENT: CPT

## 2024-04-14 PROCEDURE — 83880 ASSAY OF NATRIURETIC PEPTIDE: CPT

## 2024-04-14 PROCEDURE — 99232 SBSQ HOSP IP/OBS MODERATE 35: CPT | Performed by: GENERAL PRACTICE

## 2024-04-14 PROCEDURE — 92523 SPEECH SOUND LANG COMPREHEN: CPT

## 2024-04-14 PROCEDURE — 700111 HCHG RX REV CODE 636 W/ 250 OVERRIDE (IP): Performed by: PHYSICAL MEDICINE & REHABILITATION

## 2024-04-14 PROCEDURE — A9270 NON-COVERED ITEM OR SERVICE: HCPCS | Performed by: HOSPITALIST

## 2024-04-14 PROCEDURE — 87086 URINE CULTURE/COLONY COUNT: CPT

## 2024-04-14 PROCEDURE — 94760 N-INVAS EAR/PLS OXIMETRY 1: CPT

## 2024-04-14 PROCEDURE — 700102 HCHG RX REV CODE 250 W/ 637 OVERRIDE(OP): Performed by: PHYSICAL MEDICINE & REHABILITATION

## 2024-04-14 PROCEDURE — 770010 HCHG ROOM/CARE - REHAB SEMI PRIVAT*

## 2024-04-14 PROCEDURE — 94669 MECHANICAL CHEST WALL OSCILL: CPT

## 2024-04-14 PROCEDURE — 99232 SBSQ HOSP IP/OBS MODERATE 35: CPT | Performed by: HOSPITALIST

## 2024-04-14 PROCEDURE — 700102 HCHG RX REV CODE 250 W/ 637 OVERRIDE(OP): Performed by: HOSPITALIST

## 2024-04-14 PROCEDURE — 87077 CULTURE AEROBIC IDENTIFY: CPT

## 2024-04-14 PROCEDURE — 80048 BASIC METABOLIC PNL TOTAL CA: CPT

## 2024-04-14 PROCEDURE — 81001 URINALYSIS AUTO W/SCOPE: CPT

## 2024-04-14 PROCEDURE — 92610 EVALUATE SWALLOWING FUNCTION: CPT

## 2024-04-14 PROCEDURE — 85027 COMPLETE CBC AUTOMATED: CPT

## 2024-04-14 PROCEDURE — 700102 HCHG RX REV CODE 250 W/ 637 OVERRIDE(OP): Performed by: GENERAL PRACTICE

## 2024-04-14 PROCEDURE — A9270 NON-COVERED ITEM OR SERVICE: HCPCS | Performed by: PHYSICAL MEDICINE & REHABILITATION

## 2024-04-14 RX ORDER — POLYETHYLENE GLYCOL 3350 17 G/17G
1 POWDER, FOR SOLUTION ORAL DAILY
Status: DISCONTINUED | OUTPATIENT
Start: 2024-04-14 | End: 2024-04-23 | Stop reason: HOSPADM

## 2024-04-14 RX ORDER — AMOXICILLIN 250 MG
1 CAPSULE ORAL 2 TIMES DAILY
Status: DISCONTINUED | OUTPATIENT
Start: 2024-04-14 | End: 2024-04-23 | Stop reason: HOSPADM

## 2024-04-14 RX ADMIN — FLUTICASONE FUROATE, UMECLIDINIUM BROMIDE AND VILANTEROL TRIFENATATE 1 PUFF: 100; 62.5; 25 POWDER RESPIRATORY (INHALATION) at 08:24

## 2024-04-14 RX ADMIN — POLYETHYLENE GLYCOL 3350 1 PACKET: 17 POWDER, FOR SOLUTION ORAL at 12:37

## 2024-04-14 RX ADMIN — ATENOLOL 50 MG: 25 TABLET ORAL at 22:02

## 2024-04-14 RX ADMIN — MONTELUKAST 10 MG: 10 TABLET, FILM COATED ORAL at 22:02

## 2024-04-14 RX ADMIN — PREDNISONE 40 MG: 20 TABLET ORAL at 08:50

## 2024-04-14 RX ADMIN — RIVAROXABAN 15 MG: 15 TABLET, FILM COATED ORAL at 17:58

## 2024-04-14 RX ADMIN — SENNOSIDES AND DOCUSATE SODIUM 1 TABLET: 50; 8.6 TABLET ORAL at 12:36

## 2024-04-14 RX ADMIN — NYSTATIN: 100000 POWDER TOPICAL at 12:40

## 2024-04-14 RX ADMIN — SERTRALINE HYDROCHLORIDE 50 MG: 50 TABLET ORAL at 08:50

## 2024-04-14 RX ADMIN — MIRTAZAPINE 15 MG: 15 TABLET, ORALLY DISINTEGRATING ORAL at 22:02

## 2024-04-14 RX ADMIN — DEXTROMETHORPHAN POLISTIREX 60 MG: 30 SUSPENSION ORAL at 08:51

## 2024-04-14 RX ADMIN — SENNOSIDES AND DOCUSATE SODIUM 1 TABLET: 50; 8.6 TABLET ORAL at 22:02

## 2024-04-14 RX ADMIN — LEVOTHYROXINE SODIUM 37.5 MCG: 0.07 TABLET ORAL at 05:52

## 2024-04-14 RX ADMIN — DEXTROMETHORPHAN POLISTIREX 60 MG: 30 SUSPENSION ORAL at 22:03

## 2024-04-14 RX ADMIN — VIBEGRON 75 MG: 75 TABLET, FILM COATED ORAL at 12:37

## 2024-04-14 RX ADMIN — AZITHROMYCIN MONOHYDRATE 500 MG: 250 TABLET ORAL at 08:51

## 2024-04-14 RX ADMIN — NYSTATIN: 100000 POWDER TOPICAL at 22:03

## 2024-04-14 ASSESSMENT — BRIEF INTERVIEW FOR MENTAL STATUS (BIMS)
INITIAL REPETITION OF BED BLUE SOCK - FIRST ATTEMPT: 3
WHAT DAY OF THE WEEK IS IT: INCORRECT
ASKED TO RECALL SOCK: YES, AFTER CUEING (SOMETHING TO WEAR")"
ASKED TO RECALL BLUE: YES, NO CUE REQUIRED
ASKED TO RECALL BED: YES, NO CUE REQUIRED
WHAT YEAR IS IT: MISSED BY 2 TO 5 YEARS
WHAT MONTH IS IT: ACCURATE WITHIN 5 DAYS
BIMS SUMMARY SCORE: 11

## 2024-04-14 ASSESSMENT — ENCOUNTER SYMPTOMS
COUGH: 0
NAUSEA: 0
PALPITATIONS: 0
SHORTNESS OF BREATH: 0
BRUISES/BLEEDS EASILY: 0
FEVER: 0
MUSCULOSKELETAL NEGATIVE: 1
CHILLS: 0
ABDOMINAL PAIN: 0
VOMITING: 0
MEMORY LOSS: 1
POLYDIPSIA: 0
EYES NEGATIVE: 1

## 2024-04-14 ASSESSMENT — PAIN DESCRIPTION - PAIN TYPE: TYPE: ACUTE PAIN

## 2024-04-14 NOTE — PROGRESS NOTES
NURSING DAILY NOTE    Name: Donte Magaña   Date of Admission: 4/12/2024   Admitting Diagnosis: Acute diastolic heart failure (HCC)  Attending Physician: Lisa Mercedes D.o.  Allergies: Amiodarone, Bactrim, Cipro xr, Metoprolol, Morphine, Phytoplex z-guard [petrolatum-zinc oxide], Pseudoephedrine, Qvar [beclomethasone dipropionate], Vibramycin, Atorvastatin calcium-polysorbate 80, Augmentin, Diltiazem, Flecainide, Keflex, Mucinex, Tramadol, Atorvastatin, and Tape    Safety  Patient Assist  min assist  Patient Precautions  Fall Risk, Pacemaker Precautions  Precaution Comments  Pacemaker 2 years old per pt report  Bed Transfer Status  Moderate Assist  Toilet Transfer Status   Contact Guard Assist  Assistive Devices     Oxygen  Nasal Cannula  Diet/Therapeutic Dining  Current Diet Order   Procedures    Diet Order Diet: Level 6 - Soft and Bite Sized (cruch meds that can be crushe in puree. float others one at a time); Liquid level: Level 0 - Thin     Pill Administration  crushed, floated, and one at a time   Agitated Behavioral Scale     ABS Level of Severity       Fall Risk  Has the patient had a fall this admission?   No  Renetta Chauhan Fall Risk Scoring  14, MODERATE RISK  Fall Risk Safety Measures  bed alarm, chair alarm, poor balance, and low vision/ hearing    Vitals  Temperature: 36.8 °C (98.2 °F)  Temp src: Oral  Pulse: 70  Respiration: 18  Blood Pressure : 125/62  Blood Pressure MAP (Calculated): 83 MM HG  BP Location: Left, Upper Arm  Patient BP Position: Briceño's Position     Oxygen  Pulse Oximetry: 96 %  O2 (LPM): 2  O2 Delivery Device: Nasal Cannula    Bowel and Bladder  Last Bowel Movement  04/09/24  Stool Type     Bowel Device     Continent  Bladder:     Bowel:    Bladder Function  Urine Void (mL): 0 ml  Urine Clarity: Clear  Urine Odor: Malodorous  Number of Times Incontinent of Urine: 0  Straight Catheter: 300 ml  Genitourinary Assessment    Bladder Assessment (WDL):  WDL Except  Urine Clarity: Clear  Urine Odor: Malodorous  Number of Bladder Accidents: 0  Total Number of Bladder of Accidents in Last 7 Days: 0  Number of Times Incontinent of Urine: 0  Bladder Device: Other (Comment) (ICP)  Bladder Scan: Unable To Void  $ Bladder Scan Results (mL): 296    Skin  Ashutosh Score   16  Sensory Interventions   Skin Preventative Measures: Pillows in Use for Support / Positioning  Moisture Interventions  Moisturizers/Barriers: Barrier Paste      Pain  Pain Rating Scale  0 - No Pain  Pain Location     Pain Location Orientation     Pain Interventions   [place]    ADLs    Bathing      Linen Change      Personal Hygiene  Change Edie Pads, Moist Edie Wipes, Perineal Care  Chlorhexidine Bath      Oral Care     Teeth/Dentures     Shave     Nutrition Percentage Eaten  Lunch, Less than 25% Consumed  Environmental Precautions  Bed in Low Position, Treaded Slipper Socks on Patient  Patient Turns/Positioning  Patient Turns Self from Side to Side  Patient Turns Assistance/Tolerance     Bed Positions  Bed Controls On  Head of Bed Elevated  Self regulated      Psychosocial/Neurologic Assessment  Psychosocial Assessment  Psychosocial (WDL):  Within Defined Limits  Patient Behaviors: Anxious, Crying  Neurologic Assessment  Neuro (WDL): Exceptions to WDL  Level of Consciousness: Alert  Orientation Level: Oriented to person, Oriented to situation, Disoriented to place, Disoriented to time  Cognition: Confused in conversation  Speech: Delayed responses  Pupil Assesment: Yes  R Pupil Size (mm): 3  R Pupil Shape / Description: Round  R Pupil Reaction: Brisk  L Pupil Size (mm): 3  L Pupil Shape / Description: Round  L Pupil Reaction: Brisk  EENT (WDL):  WDL Except    Cardio/Pulmonary Assessment  Edema      Respiratory Breath Sounds  RUL Breath Sounds: Diminished  RML Breath Sounds: Diminished  RLL Breath Sounds: Diminished  DO Breath Sounds: Diminished  LLL Breath Sounds:  Diminished  Cardiac Assessment   Cardiac (WDL):  WDL Except (A Fib, HTN.)

## 2024-04-14 NOTE — FLOWSHEET NOTE
04/14/24 0906   Events/Summary/Plan   Events/Summary/Plan spot check done on 1 liters pt doing well   Vital Signs   Pulse 86   Respiration 18   Pulse Oximetry 96 %   $ Pulse Oximetry (Spot Check) Yes   Oxygen   O2 (LPM) 1   O2 Delivery Device Nasal Cannula

## 2024-04-14 NOTE — PROGRESS NOTES
Hospital Medicine Daily Progress Note        Chief Complaint  Atrial Fibrillation, Hypertension    Interval Problem Update  No overnight events reported to me.  Lab and imaging results reviewed and discussed.    Review of Systems  Review of Systems   Constitutional:  Negative for chills and fever.   HENT: Negative.     Eyes: Negative.    Respiratory:  Negative for cough and shortness of breath.    Cardiovascular:  Negative for chest pain and palpitations.   Gastrointestinal:  Negative for abdominal pain, nausea and vomiting.   Musculoskeletal: Negative.    Skin:  Negative for itching and rash.   Endo/Heme/Allergies:  Negative for polydipsia. Does not bruise/bleed easily.   Psychiatric/Behavioral:  Positive for memory loss.         Physical Exam  Temp:  [36.4 °C (97.5 °F)-36.8 °C (98.2 °F)] 36.6 °C (97.8 °F)  Pulse:  [64-91] 86  Resp:  [18-20] 18  BP: (125-132)/(59-67) 128/59  SpO2:  [94 %-99 %] 96 %    Physical Exam  Vitals reviewed.   Constitutional:       General: She is not in acute distress.     Appearance: Normal appearance. She is not ill-appearing.   HENT:      Head: Normocephalic and atraumatic.      Right Ear: External ear normal.      Left Ear: External ear normal.      Nose: Nose normal.      Mouth/Throat:      Pharynx: Oropharynx is clear.   Eyes:      General:         Right eye: No discharge.         Left eye: No discharge.      Extraocular Movements: Extraocular movements intact.      Conjunctiva/sclera: Conjunctivae normal.   Cardiovascular:      Rate and Rhythm: Normal rate and regular rhythm.   Pulmonary:      Effort: No respiratory distress.      Breath sounds: No wheezing.      Comments: Decreased BS  Abdominal:      General: Bowel sounds are normal. There is no distension.      Palpations: Abdomen is soft.      Tenderness: There is no abdominal tenderness.   Musculoskeletal:      Cervical back: Normal range of motion and neck supple.      Right lower leg: No edema.      Left lower leg: No edema.    Skin:     General: Skin is warm and dry.   Neurological:      Mental Status: She is alert.      Comments: Awake and alert         Fluids    Intake/Output Summary (Last 24 hours) at 4/14/2024 0909  Last data filed at 4/14/2024 0440  Gross per 24 hour   Intake 480 ml   Output 300 ml   Net 180 ml       Laboratory  Recent Labs     04/12/24  0828 04/13/24  0615 04/14/24  0602   WBC 10.2 8.0 12.1*   RBC 4.26 4.24 4.13*   HEMOGLOBIN 13.7 13.6 13.1   HEMATOCRIT 43.4 42.7 40.5   .9* 100.7* 98.1*   MCH 32.2 32.1 31.7   MCHC 31.6* 31.9* 32.3   RDW 51.3* 49.5 46.5   PLATELETCT 309 283 342   MPV 9.9 9.9 9.9     Recent Labs     04/12/24 0828 04/13/24  0615 04/14/24  0602   SODIUM 136 137 137   POTASSIUM 4.9 4.6 4.7   CHLORIDE 100 102 100   CO2 27 25 24   GLUCOSE 121* 96 132*   BUN 45* 38* 46*   CREATININE 1.37 1.07 1.04   CALCIUM 9.6 9.3 9.5                   Assessment/Plan  Macrocytosis without anemia  Assessment & Plan  Folate testing no longer performed at this facility   Vit B12 normal  Likely related to hypothyroidism  Follow H/H    Hypophosphatemia  Assessment & Plan  Completed supplement  Check F/U labs in AM     Acute respiratory failure (HCC)- (present on admission)  Assessment & Plan  CT bilateral ground glass opacities, negative for PE  Echo EF 60%, grade II diastolic dysfunction, mild MR/mod AI  On Azithromycin and Prednisone per [facility] D/C Summary  BNP worse off Lasix but stable  CXR negative acute  Soft Tissue Neck X-ray unremarkable  SLP consult given episode of choking on pill 4/13/24  On Trelegy, Singulair, and Delsym  RT protocol    Essential hypertension- (present on admission)  Assessment & Plan  Observe blood pressure trends on Atenolol    Azotemia- (present on admission)  Assessment & Plan  2/2 overly aggressive diuresis at [facility]  Continue to hold Lasix, Aldactone, and Lisinopril  Closely follow renal function    Mild Leukocytosis- (present on admission)  Assessment & Plan  Likely 2/2 steroids  But  request PCT and UA  CXR 4/13/24 negative acute  Follow Temp and WBC    Multiple drug allergies- (present on admission)  Assessment & Plan  Noted    Persistent atrial fibrillation (HCC)- (present on admission)  Assessment & Plan  S/P PPM  On Atenolol for rate control  Anticoagulated on Xarelto    Mild episode of recurrent major depressive disorder (HCC)- (present on admission)  Assessment & Plan  On Zoloft    Hypothyroidism- (present on admission)  Assessment & Plan  TSH 0.18 and FT4 1.53  Decreased Synthroid       Full Code    Reviewed w/ RN and RT

## 2024-04-14 NOTE — PROGRESS NOTES
"  Physical Medicine & Rehabilitation Progress Note    Encounter Date: 4/14/2024    Chief Complaint: Acute respiratory failure (HCC) [J96.00]    Interval Events (Subjective):  Acute problems/issues reported by staff: None    Acute problems/issues reported by patient: none      Participating in therapy. Pain controlled. PO tolerant.      ROS: Denies fever, chill, headache, dizziness, blurry vision, cough, shortness of breath, chest pain, nausea, vomiting, bowel changes, or urinary changes.        Medications:  Scheduled Medications   Medication Dose Frequency    senna-docusate  1 Tablet BID    polyethylene glycol/lytes  1 Packet DAILY    levothyroxine  37.5 mcg AM ES    montelukast  10 mg Nightly    Pharmacy Consult Request  1 Each PHARMACY TO DOSE    atenolol  50 mg Q EVENING    Dextromethorphan Polistirex ER  60 mg BID    fluticasone-umeclidinium-vilanterol  1 Puff QDAILY (RT)    mirtazapine  15 mg QHS    nystatin   BID    rivaroxaban  15 mg PM MEAL    sertraline  50 mg DAILY    vibegron  75 mg DAILY    predniSONE  40 mg DAILY     PRN medications: Respiratory Therapy Consult, hydrALAZINE, carboxymethylcellulose, benzocaine-menthol, traZODone, sodium chloride, acetaminophen, ALPRAZolam, benzonatate, ipratropium-albuterol, menthol    Diet:  Current Diet Order   Procedures    Diet Order Diet: Level 6 - Soft and Bite Sized (cruch meds that can be crushe in puree. float others one at a time); Liquid level: Level 0 - Thin       Objective:  VITAL SIGNS: /59   Pulse 86   Temp 36.6 °C (97.8 °F) (Temporal)   Resp 18   Ht 1.626 m (5' 4\")   Wt 77.1 kg (170 lb 1 oz)   LMP 01/10/1975   SpO2 96%   BMI 29.19 kg/m²     General:  No acute distress.   Participating in therapy  HEENT: normocephalic, atraumatic, sclera and conjunctiva normal, normal neck ROM   Respiratory: No coughing.  Non-labored respirations. full sentences. clear to auscultation bilaterally   Cardiovascular:  Regular rate and rhythm. peripheral " perfusion normal   Abdomen: soft, non-tender, non distended, bowel sounds normal   Extremities: no edema; no bilateral calf pain.    Mental status/ Cognition:  Alert. Appropriate responses    Psychiatric: normal affect.  Cooperative.        Laboratory Values:  Recent Results (from the past 72 hour(s))   Renal Function Panel    Collection Time: 04/11/24  2:29 PM   Result Value Ref Range    Sodium 135 135 - 145 mmol/L    Potassium 4.6 3.6 - 5.5 mmol/L    Chloride 98 96 - 112 mmol/L    Co2 22 20 - 33 mmol/L    Glucose 115 (H) 65 - 99 mg/dL    Creatinine 1.62 (H) 0.50 - 1.40 mg/dL    Bun 41 (H) 8 - 22 mg/dL    Calcium 9.4 8.5 - 10.5 mg/dL    Correct Calcium 9.5 8.5 - 10.5 mg/dL    Phosphorus 4.1 2.5 - 4.5 mg/dL    Albumin 3.9 3.2 - 4.9 g/dL   ESTIMATED GFR    Collection Time: 04/11/24  2:29 PM   Result Value Ref Range    GFR (CKD-EPI) 31 (A) >60 mL/min/1.73 m 2   CoV-2, Flu A/B, And RSV by PCR ([employer])    Collection Time: 04/12/24  2:40 AM    Specimen: Nasopharyngeal; Respirate   Result Value Ref Range    Influenza virus A RNA Negative Negative    Influenza virus B, PCR Negative Negative    RSV, PCR Negative Negative    SARS-CoV-2 by PCR NotDetected     SARS-CoV-2 Source 1    MAGNESIUM    Collection Time: 04/12/24  8:28 AM   Result Value Ref Range    Magnesium 2.1 1.5 - 2.5 mg/dL   Comp Metabolic Panel    Collection Time: 04/12/24  8:28 AM   Result Value Ref Range    Sodium 136 135 - 145 mmol/L    Potassium 4.9 3.6 - 5.5 mmol/L    Chloride 100 96 - 112 mmol/L    Co2 27 20 - 33 mmol/L    Anion Gap 9.0 7.0 - 16.0    Glucose 121 (H) 65 - 99 mg/dL    Bun 45 (H) 8 - 22 mg/dL    Creatinine 1.37 0.50 - 1.40 mg/dL    Calcium 9.6 8.5 - 10.5 mg/dL    Correct Calcium 9.9 8.5 - 10.5 mg/dL    AST(SGOT) 14 12 - 45 U/L    ALT(SGPT) 12 2 - 50 U/L    Alkaline Phosphatase 75 30 - 99 U/L    Total Bilirubin 0.5 0.1 - 1.5 mg/dL    Albumin 3.6 3.2 - 4.9 g/dL    Total Protein 7.1 6.0 - 8.2 g/dL    Globulin 3.5 1.9 - 3.5 g/dL    A-G Ratio 1.0  g/dL   CBC WITH DIFFERENTIAL    Collection Time: 04/12/24  8:28 AM   Result Value Ref Range    WBC 10.2 4.8 - 10.8 K/uL    RBC 4.26 4.20 - 5.40 M/uL    Hemoglobin 13.7 12.0 - 16.0 g/dL    Hematocrit 43.4 37.0 - 47.0 %    .9 (H) 81.4 - 97.8 fL    MCH 32.2 27.0 - 33.0 pg    MCHC 31.6 (L) 32.2 - 35.5 g/dL    RDW 51.3 (H) 35.9 - 50.0 fL    Platelet Count 309 164 - 446 K/uL    MPV 9.9 9.0 - 12.9 fL    Neutrophils-Polys 65.10 44.00 - 72.00 %    Lymphocytes 18.80 (L) 22.00 - 41.00 %    Monocytes 10.50 0.00 - 13.40 %    Eosinophils 4.10 0.00 - 6.90 %    Basophils 0.60 0.00 - 1.80 %    Immature Granulocytes 0.90 0.00 - 0.90 %    Nucleated RBC 0.00 0.00 - 0.20 /100 WBC    Neutrophils (Absolute) 6.66 1.82 - 7.42 K/uL    Lymphs (Absolute) 1.93 1.00 - 4.80 K/uL    Monos (Absolute) 1.08 (H) 0.00 - 0.85 K/uL    Eos (Absolute) 0.42 0.00 - 0.51 K/uL    Baso (Absolute) 0.06 0.00 - 0.12 K/uL    Immature Granulocytes (abs) 0.09 0.00 - 0.11 K/uL    NRBC (Absolute) 0.00 K/uL   PROCALCITONIN    Collection Time: 04/12/24  8:28 AM   Result Value Ref Range    Procalcitonin 0.13 <0.25 ng/mL   ESTIMATED GFR    Collection Time: 04/12/24  8:28 AM   Result Value Ref Range    GFR (CKD-EPI) 38 (A) >60 mL/min/1.73 m 2   proBrain Natriuretic Peptide, NT    Collection Time: 04/12/24  8:28 AM   Result Value Ref Range    NT-proBNP 622 (H) 0 - 125 pg/mL   POCT glucose device results    Collection Time: 04/12/24 11:35 AM   Result Value Ref Range    POC Glucose, Blood 144 (H) 65 - 99 mg/dL   CBC with Differential    Collection Time: 04/13/24  6:15 AM   Result Value Ref Range    WBC 8.0 4.8 - 10.8 K/uL    RBC 4.24 4.20 - 5.40 M/uL    Hemoglobin 13.6 12.0 - 16.0 g/dL    Hematocrit 42.7 37.0 - 47.0 %    .7 (H) 81.4 - 97.8 fL    MCH 32.1 27.0 - 33.0 pg    MCHC 31.9 (L) 32.2 - 35.5 g/dL    RDW 49.5 35.9 - 50.0 fL    Platelet Count 283 164 - 446 K/uL    MPV 9.9 9.0 - 12.9 fL    Neutrophils-Polys 52.40 44.00 - 72.00 %    Lymphocytes 29.00 22.00 -  41.00 %    Monocytes 11.10 0.00 - 13.40 %    Eosinophils 6.10 0.00 - 6.90 %    Basophils 0.70 0.00 - 1.80 %    Immature Granulocytes 0.70 0.00 - 0.90 %    Nucleated RBC 0.00 0.00 - 0.20 /100 WBC    Neutrophils (Absolute) 4.19 1.82 - 7.42 K/uL    Lymphs (Absolute) 2.32 1.00 - 4.80 K/uL    Monos (Absolute) 0.89 (H) 0.00 - 0.85 K/uL    Eos (Absolute) 0.49 0.00 - 0.51 K/uL    Baso (Absolute) 0.06 0.00 - 0.12 K/uL    Immature Granulocytes (abs) 0.06 0.00 - 0.11 K/uL    NRBC (Absolute) 0.00 K/uL   Comp Metabolic Panel (CMP)    Collection Time: 04/13/24  6:15 AM   Result Value Ref Range    Sodium 137 135 - 145 mmol/L    Potassium 4.6 3.6 - 5.5 mmol/L    Chloride 102 96 - 112 mmol/L    Co2 25 20 - 33 mmol/L    Anion Gap 10.0 7.0 - 16.0    Glucose 96 65 - 99 mg/dL    Bun 38 (H) 8 - 22 mg/dL    Creatinine 1.07 0.50 - 1.40 mg/dL    Calcium 9.3 8.5 - 10.5 mg/dL    Correct Calcium 9.8 8.5 - 10.5 mg/dL    AST(SGOT) 16 12 - 45 U/L    ALT(SGPT) 7 2 - 50 U/L    Alkaline Phosphatase 69 30 - 99 U/L    Total Bilirubin 0.5 0.1 - 1.5 mg/dL    Albumin 3.4 3.2 - 4.9 g/dL    Total Protein 6.5 6.0 - 8.2 g/dL    Globulin 3.1 1.9 - 3.5 g/dL    A-G Ratio 1.1 g/dL   proBrain Natriuretic Peptide, NT    Collection Time: 04/13/24  6:15 AM   Result Value Ref Range    NT-proBNP 1233 (H) 0 - 125 pg/mL   Magnesium    Collection Time: 04/13/24  6:15 AM   Result Value Ref Range    Magnesium 2.0 1.5 - 2.5 mg/dL   Phosphorus    Collection Time: 04/13/24  6:15 AM   Result Value Ref Range    Phosphorus 2.1 (L) 2.5 - 4.5 mg/dL   TSH with Reflex to FT4    Collection Time: 04/13/24  6:15 AM   Result Value Ref Range    TSH 0.180 (L) 0.380 - 5.330 uIU/mL   Vitamin D, 25-hydroxy (blood)    Collection Time: 04/13/24  6:15 AM   Result Value Ref Range    25-Hydroxy   Vitamin D 25 63 30 - 100 ng/mL   ESTIMATED GFR    Collection Time: 04/13/24  6:15 AM   Result Value Ref Range    GFR (CKD-EPI) 51 (A) >60 mL/min/1.73 m 2   FREE THYROXINE    Collection Time: 04/13/24   6:15 AM   Result Value Ref Range    Free T-4 1.53 0.93 - 1.70 ng/dL   VITAMIN B12    Collection Time: 24  6:02 AM   Result Value Ref Range    Vitamin B12 -True Cobalamin 331 211 - 911 pg/mL   CBC WITHOUT DIFFERENTIAL    Collection Time: 24  6:02 AM   Result Value Ref Range    WBC 12.1 (H) 4.8 - 10.8 K/uL    RBC 4.13 (L) 4.20 - 5.40 M/uL    Hemoglobin 13.1 12.0 - 16.0 g/dL    Hematocrit 40.5 37.0 - 47.0 %    MCV 98.1 (H) 81.4 - 97.8 fL    MCH 31.7 27.0 - 33.0 pg    MCHC 32.3 32.2 - 35.5 g/dL    RDW 46.5 35.9 - 50.0 fL    Platelet Count 342 164 - 446 K/uL    MPV 9.9 9.0 - 12.9 fL   Basic Metabolic Panel    Collection Time: 24  6:02 AM   Result Value Ref Range    Sodium 137 135 - 145 mmol/L    Potassium 4.7 3.6 - 5.5 mmol/L    Chloride 100 96 - 112 mmol/L    Co2 24 20 - 33 mmol/L    Glucose 132 (H) 65 - 99 mg/dL    Bun 46 (H) 8 - 22 mg/dL    Creatinine 1.04 0.50 - 1.40 mg/dL    Calcium 9.5 8.5 - 10.5 mg/dL    Anion Gap 13.0 7.0 - 16.0   proBrain Natriuretic Peptide, NT    Collection Time: 24  6:02 AM   Result Value Ref Range    NT-proBNP 1144 (H) 0 - 125 pg/mL   ESTIMATED GFR    Collection Time: 24  6:02 AM   Result Value Ref Range    GFR (CKD-EPI) 53 (A) >60 mL/min/1.73 m 2       Imagin24 chest x-ray: No obvious abnormalities and similar to chest x-ray 2024 neck x-ray: No obvious soft tissue abnormalities    Medical Decision Making and Plan:  Reviewed primary care team's plan.  Followed by hospitalist    Rehab - Functional status reviewed briefly and progressing towards goals. Cont therapy plan     Pain - will monitor pain levels and adjust medications accordingly     Neuro -  monitor for neurological deficits and continue management plan of primary care team     Ortho -  continue management plan of primary care team to include pain management plan     Cardiac/Pulm - monitor clinical presentation and vitals; continue management plan of primary care team   Acute  heart failure with preserved ejection fraction  PT and OT for mobility and ADLs. Per guidelines, 15 hours per week between PT, OT and/or SLP.  Follow-up cardiology  Prior to admission: Lasix 60 mg IV twice daily, lisinopril 5 mg daily, Aldactone 25 mg daily on HOLD due to hypotension from aggressive diuresis s/p gentle fluids  Hospitalist consult by primary team  4/13 BNP elevated 1233 FROM 622 and will discuss with hospitalist this can be secondary to pulmonary diseases, illness, and cardiac conditions  4/14 BNP downtrending to 1144; followed by hospitalist     Acute hypoxemic respiratory failure secondary to multifocal pneumonia seen on CT  Procalcitonin, WBC within normal limits.  Being treated with Zithromax through 4/14 for possible atypical pneumonia  RT to consult  Encourage IS  Pep therapy  DuoNeb as needed  Continue Delsym  Continue Trelegy Ellipta  Per DR Mercedes notes: Pulm recommendation just prior to rehab admission is for short course of prednisone 40 mg daily for 5 days.  Initiating 4/13.  Discussed with pulmonary, patient's unspecified allergy/reaction to beclomethasone nonissue with starting prednisone. Discussed with patient as well. Patient states has many issues to many medications. None known to prednisone. Continuing with prescription with patient and family permission as this recommendation per Pulm was discussed with patient's POA Reina.   4/13 near choking event, chest x-ray and neck x-ray did not reveal acute abnormalities; no additional oxygen required at this time; placed speech-language referral    FEN  4/13 Increased BUN to 38; will recheck  4/14 increased to 46; hospice following with recheck labs tomorrow    Leukocytosis  4/14 mild elevation; afebrile; asymptomatic likely from steroids    Endo  4/13 low TSH and normal free T4; could be in the setting of nonthyroidal illness and/or due to prednisone and may require evaluation post hospitalization    Diet  4/13 upgraded to soft and  bite-size and thin liquids ; will have speech therapy recheck though it was near aspiration with a pill  4/14 asymptomatic today, no other events, evaluated by SLP    GI -will monitor bowel movements and adjust medications accordingly  Constipation  4/14 added bowel regimen of Senokot and miralax     - will monitor voiding and adjust accordingly    DVT px - cont prophylaxis     Dispo - per primary team       I, Alfred Gonsalves DO, personally performed a complete drug regimen review and no potential clinically significant medication issues were identified.  ____________________________________    Alfred Gonsalves DO  Physical Medicine and Rehabilitation  [facility]  ____________________________________

## 2024-04-14 NOTE — FLOWSHEET NOTE
04/14/24 0824   Events/Summary/Plan   Events/Summary/Plan spot check done meds given pt remains on 2 liters and doing well   Skin Integrity Intact   Vital Signs   Pulse 91   Respiration 18   Pulse Oximetry 99 %   $ Pulse Oximetry (Spot Check) Yes   Respiratory Assessment   Respiratory Pattern Within Normal Limits   Level of Consciousness Alert   Chest Exam   Work Of Breathing / Effort Within Normal Limits   Breath Sounds   RUL Breath Sounds Diminished   RML Breath Sounds Diminished   RLL Breath Sounds Diminished   DO Breath Sounds Diminished   LLL Breath Sounds Diminished   Secretions   Cough Strong;Dry   How Sputum Obtained Spontaneous   Sputum Amount Unable to Evaluate   Sputum Color Unable to Evaluate   Sputum Consistency Unable to Evaluate   Oxygen   O2 (LPM) 2  (decreased fio2 to 1 liter will continue to monitor)   O2 Delivery Device Nasal Cannula

## 2024-04-14 NOTE — FLOWSHEET NOTE
04/14/24 0827   Inhalation Therapy Treatment   $ MDI/DPI Given MDI/DPI x 1   Chest Physiotherapy Treatment   $ PEP/CPT Performed PEP / Flutter

## 2024-04-14 NOTE — ASSESSMENT & PLAN NOTE
WBC's: has been elevated since 4/14  WBC's still elevated at 14.9  Has been afebrile  Denies any cough or SOB  O2 sats good on RA  Lungs CTA B/L  PCT (4/15): wnl  Urine cultures showed Candida --> s/p Diflucan (4/21)  Reviewed meds: none seem to be the cause  CXR (4/10): minimal hazy opacity left base, likely atelectasis   CXR (4/13): unremarkable  CXR (4/22): shows a faint left basilar atelectasis or pneumonia  Will get U/A  Will check PCT  Suspect 2nd to buttock pressure sore  Cont to monitor

## 2024-04-14 NOTE — THERAPY
"Speech Language Pathology   Initial Assessment     Patient Name: Donte Magaña  AGE:  83 y.o., SEX:  female  Medical Record #: [id number]  Today's Date: 4/14/2024     Subjective    Per Dr. Mercedes' H&P:  \"Donte Magaña is an 83-year-old female who presented to [facility] 4/7/2024 with persistent shortness of breath and cough.  She has medical history significant for atrial fibrillation on AC, hypertension, hypothyroidism.  She states that over the past week she has had a persistent cough.  She initially presented to the emergency room on 4/4 but Neel presented on 4/7.  Her viral panel was unremarkable.  She was requiring 2 L of supplemental oxygen.  CT showed multifocal pneumonia.  She was empirically started on IV antibiotics and was admitted for acute hypoxemic respiratory failure.  Procalcitonin was not elevated, blood cultures have shown no growth to date.  Her BNP was elevated.  Her CTA of the chest was negative for PE with only some bilateral groundglass opacities, all infectious markers negative.  TTE showed normal LVEF and grade 2 diastolic dysfunction with moderate aortic insufficiency.  She was started on Lasix.  Diuresis was ultimately increased and the suspicion for pneumonia being the cause of her symptoms was less likely though she was maintained on antibiotics.  Due to diuresis she sustained acute kidney injury due to hypovolemia and was given fluids, her Lasix, lisinopril, Aldactone have been held.  Azithromycin was started due to concern for atypical pneumonia.  Viral panel was ordered 4/11 and is negative.  Her renal function is improving, she does not have a white count.  BNP is improving.     Patient functioning below her baseline and deemed an appropriate candidate for acute rehab.  She resides at an [facility].  She will plan to have increased services on discharge per her daughter.  Patient admitted to acute rehab 4/12/2024.  On admission patient reports she is doing okay.  Her daughter, Reina, is " "present.  Discussed plan to start steroids per pulmonology.  These will start tomorrow.  She is amenable to plan of care.  Discussed CODE STATUS and she is full code.\"    She was referred to SLP for clinical swallow evaluation following a choking incident yesterday afternoon that resulted in a rapid response. Patient had recent Dx of pneumonia concerning for risk of aspiration.     Objective       04/14/24 1031   Evaluation Charges   Charges Yes   SLP Speech Language Evaluation Speech Sound Language Comprehension   SLP Oral Pharyngeal Evaluation Oral Pharyngeal Evaluation   SLP Total Time Spent   SLP Individual Total Time Spent (Mins) 90   Precautions   Precautions Fall Risk;Pacemaker Precautions   Prior Living Situation   Prior Services Housekeeping / Homemaker Services;Intermittent Physical Support for ADL Per Service   Housing / Facility Assisted Living Residence  (lives on second floor)   Lives with - Patient's Self Care Capacity Alone and Unable to Care For Self  (group home -> meals/cleaning)   Prior Level Of Function   Communication Within Functional Limits   Swallow Within Functional Limits   Dentition Intact   Dentures None   Hearing Within Functional Limits for Evaluation   Hearing Aid None   Vision Wears Corrective Lenses;Reading ;Distance  (Bifocals)   Patient's Primary Language English   Education High School Graduate or GED   Occupation (Pre-Hospital Vocational) Retired Due To Age   Prior Functioning: Everyday Activities   Self Care Independent   Indoor Mobility (Ambulation) Independent   Stairs Independent   Functional Cognition Needed some help   Prior Device Use Motorized wheelchair or scooter   Written Language Expression   Dominant Hand Right   ABS (Agitated Behavior Scale)   Agitated Behavior Scale Performed No   Cognitive Pattern Assessment   Cognitive Pattern Assessment Used BIMS   Brief Interview for Mental Status (BIMS)   Repetition of Three Words (First Attempt) 3   Temporal Orientation: Year " "Missed by 2 to 5 years  (2022)   Temporal Orientation: Month Accurate within 5 days   Temporal Orientation: Day Incorrect   Recall: \"Sock\" Yes, after cueing (\"something to wear\")   Recall: \"Blue\" Yes, no cue required   Recall: \"Bed\" Yes, no cue required   BIMS Summary Score 11   Confusion Assessment Method (CAM)   Is there evidence of an acute change in mental status from the patient's baseline? Yes   Inattention Behavior present, fluctuates (comes and goes, changes in severity)   Disorganized thinking Behavior not present   Altered level of consciousness Behavior present, fluctuates (comes and goes, changes in severity)   Tracheostomy   Tracheostomy No   Speech Mechanisms / Voice Production   Speech Mechanisms / Voice Production (WDL) WDL   Swallowing   Swallowing (WDL) X   Thin Liquid Minimal   Serial Swallows Thin / Nectar (Straw) Minimal   Masticated Foods Minimal   Dysphagia Strategies / Recommendations   Compensatory Strategies Direct Supervision During Meals;Cough / Clear Wet Vocal Quality Post Swallow;Single Sips / Bites;Alternate Solids / Liquids   Diet / Liquid Recommendation Soft & Bite-Sized (6) - (Dysphagia III);Thin (0)   Medication Administration  Crush all Medications in Puree  (Float whole in puree as necessary)   Therapy Interventions Dysphagia Therapy By Speech Language Pathologist;Therapeutic Dining For Meals;MBSS Evaluation   Follow Up SLP Evaluation MBSS   Barriers To Oral Feeding   Barriers To Oral Feeding Generalized Weakness;Lethargic / Inadequate Alertness;Impaired Cognition / Attention;Pneumonia (History);Pneumonia (Current)   Cervical Ausculatation Not Tested   Pre-Feeding Oral Stimulation Trial Not Tested   Swallowing/Nutritional Status   Swallowing/Nutritional Status Modified food consistency   Eating   Assistance Needed Set-up / clean-up   Physical Assistance Level No physical assistance   CARE Score - Eating 5   Eating Discharge Goal   Discharge Goal 6   Oral Hygiene   Assistance " Needed Set-up / clean-up;Supervision   Physical Assistance Level No physical assistance   CARE Score - Oral Hygiene 4   Oral Hygiene Discharge Goal   Discharge Goal 6   Functional Level of Assist   Eating Moderate Assist   Eating Description Increased time;Modified diet;Set-up of equipment or meal/tube feeding;Supervision for safety;Verbal cueing   Comprehension Minimal Assist   Comprehension Description Glasses;Verbal cues   Expression Supervision   Expression Description Verbal cueing   Social Interaction Supervision   Social Interaction Description Increased time;Schedule;Verbal cues   Memory Moderate Assist   Memory Description Bed/chair alarm;Increased time;Seat belt;Therapy schedule;Verbal cueing   Outcome Measures   Outcome Measures Utilized SCCAN   SCCAN (Scales of Cognitive and Communicative Ability for Neurorehabilitation)   Oral Expression - Raw Score 15   Oral Expression - Scale Performance Score 79   Orientation - Raw Score 9   Orientation - Scale Performance Score 75   Memory - Raw Score 8   Memory - Scale Performance Score 42   Speech Comprehension - Raw Score 10   Speech Comprehension - Scale Performance Score 77   Problem List   Problem List Cognitive-Linguistic Deficits;Dysphagia;Executive Function Deficit   Current Discharge Plan   Current Discharge Plan Return to Prior Living Situation   Benefit   Therapy Benefit Patient would benefit from Inpatient Rehab Speech-Language Pathology to address above identified deficits.   Interdisciplinary Plan of Care Collaboration   IDT Collaboration with  Physician;Certified Nursing Assistant   Patient Position at End of Therapy Seated;Chair Alarm On;Other (Comments)  (CNA in room)   Collaboration Comments Handoff of care to CNAs in dining room for lunch   Strengths & Barriers   Strengths Willingly participates in therapeutic activities;Pleasant and cooperative;Able to follow instructions;Effective communication skills   Barriers Aspiration risk;Difficulty  following instructions   Speech Language Pathologist Assigned   Assigned SLP / Treatment Time / Comments LM 60 Cog&Swallow     Assessment    Patient is 83 y.o. female with a diagnosis of Pneumonia and heart failure.  Additional factors influencing patient status/progress (ie: cognitive factors, co-morbidities, social support, etc): atrial fibrillation on AC, hypertension, hypothyroidism, general lethargy.    Cognitive-Linguistic Evaluation:    Portions of The Scales of Cognitive and Communicative Ability for Neurorehabilitation (SCCAN) were completed this session. The SCCAN assesses cognitive-communicative deficits and functional ability in patients in rehabilitation hospitals, clinics, and skilled nursing facilities. The SCCAN is appropriate for a broad range of neurological patients, provides a measure of both impairment and functional ability. The SCCAN was not completed r/t time constraints.    SCCAN (Scales of Cognitive and Communicative Ability for Neurorehabilitation)  Oral Expression - Raw Score: 15  Oral Expression - Scale Performance Score: 79  Orientation - Raw Score: 9  Orientation - Scale Performance Score: 75  Memory - Raw Score: 8  Memory - Scale Performance Score: 42  Speech Comprehension - Raw Score: 10  Speech Comprehension - Scale Performance Score: 77    Overall, the patient presents with challenges in the areas of orientation, memory, and speech comprehension. She demonstrated relative strengths in the area of oral expression, although, her scores were borderline. Recommend completing the SCCAN with goals to follow.    Bedside/Clinical Swallow Evaluation:    Pt with recent Hx of pneumonia referred to SLP for clinical swallow evaluation following a choking incident yesterday afternoon that resulted in a rapid response.    Pt with baseline cough throughout session reported that it was difficult for her to attribute cough to anything in-particular during swallow evaluation.    Pt took single and  serial swallows of 0-Thin liquids with resulting cough responses immediately following swallows. She fed herself 6- Soft & Bite Sized, 5- Minced & Moist, and 4- Pureed textures with resulting cough responses throughout. She denied globus sensation and there were no perceptual changes to her voice following swallows of any texture.    Recommend the patient remain in current diet texture of 6- Soft & Bite Sized/0-Thin liquids, medications crushed in puree, floated whole as necessary. Recommend the patient be admitted to [facility] for supervision during meals, with strict use of the following swallow strategies: single bites/sips, alternate bites/sips, slow rate of intake. Recommend MBSS to inform dysphagia management.    Plan  Recommend Speech Therapy 30-60 minutes per day 5-6 days per week for 10-14 days for the following treatments:  SLP Swallowing Dysfunction Treatment, SLP Video Swallow Evaluation, and SLP Cognitive Skill Development.    Passport items to be completed:  Express basic needs, understand food/liquid recommendations, consistently follow swallow precautions, manage finances, manage medications, arrive to therapy appointments on time, complete daily memory log entries, solve problems related to safety situations, review education related to hospitalization, complete caregiver training     Goals:  Long term and short term goals have been discussed with patient and they are in agreement.    Speech Therapy Problems (Active)       Problem: Memory STGs       Dates: Start:  04/14/24         Goal: STG-Within one week, patient will complete the SCCAN with goals to follow.       Dates: Start:  04/14/24               Problem: Speech/Swallowing LTGs       Dates: Start:  04/14/24         Goal: LTG-By discharge, patient will safely swallow textures/consistencies determined by MBSS to be the least restrictive with no overt s/sx of aspiration, no decline in respiratory status.       Dates: Start:  04/14/24                Problem: Swallowing STGs       Dates: Start:  04/14/24         Goal: STG-Within one week, patient will participate in MBSS to inform dysphagia management.       Dates: Start:  04/14/24

## 2024-04-14 NOTE — CARE PLAN
Problem: Self Care  Goal: Patient will have the ability to perform ADLs independently or with assistance  Outcome: Progressing  Note: Patient able to perform regular activities this shift.  Pain controlled this shift.  Pain management includes PRN pain meds as well as non-pharmacological measures such as emotional support, rest, and repositioning.  Will continue to monitor.   The patient is Stable - Low risk of patient condition declining or worsening    Shift Goals  Clinical Goals: safety  Patient Goals: Rest    Progress made toward(s) clinical / shift goals:  pt participates with therapy and is cooperative with nursing    Patient is not progressing towards the following goals:

## 2024-04-14 NOTE — CARE PLAN
"  Problem: Fall Risk - Rehab  Goal: Patient will remain free from falls  Outcome: Progressing  Note: Renetta Chauhan Fall risk Assessment Score: 14    Moderate fall risk Interventions  - Bed and strip alarm   - Yellow sign by the door   - Yellow wrist band \"Fall risk\"  - Room near to the nurse station  - Do not leave patient unattended in the bathroom  - Fall risk education provided       Problem: Risk for Aspiration  Goal: Patient's risk for aspiration will be absent or decrease  Outcome: Progressing  Note: Aspiration precautions observed , head of bed elevated during med pass, meds crushed , floated in apple sauce , tolerated . No s/s of aspiration noted.   The patient is Stable - Low risk of patient condition declining or worsening    Shift Goals  Clinical Goals: safety  Patient Goals: Rest    Progress made toward(s) clinical / shift goals:  Pt free from fall and injury.    "

## 2024-04-14 NOTE — PROGRESS NOTES
Received shift report and assumed care of patient.  Patient awake, calm and stable, currently positioned in bed for comfort and safety; call light within reach.  Denies pain or discomfort at this time.  Will continue to monitor.   History/Exam/Medical Decision Making

## 2024-04-15 ENCOUNTER — APPOINTMENT (OUTPATIENT)
Dept: RADIOLOGY | Facility: REHABILITATION | Age: 84
DRG: 291 | End: 2024-04-15
Attending: PHYSICAL MEDICINE & REHABILITATION
Payer: MEDICARE

## 2024-04-15 ENCOUNTER — APPOINTMENT (OUTPATIENT)
Dept: SPEECH THERAPY | Facility: REHABILITATION | Age: 84
DRG: 291 | End: 2024-04-15
Attending: GENERAL PRACTICE
Payer: MEDICARE

## 2024-04-15 ENCOUNTER — APPOINTMENT (OUTPATIENT)
Dept: SPEECH THERAPY | Facility: REHABILITATION | Age: 84
DRG: 291 | End: 2024-04-15
Attending: PHYSICAL MEDICINE & REHABILITATION
Payer: MEDICARE

## 2024-04-15 ENCOUNTER — APPOINTMENT (OUTPATIENT)
Dept: OCCUPATIONAL THERAPY | Facility: REHABILITATION | Age: 84
DRG: 291 | End: 2024-04-15
Attending: PHYSICAL MEDICINE & REHABILITATION
Payer: MEDICARE

## 2024-04-15 ENCOUNTER — APPOINTMENT (OUTPATIENT)
Dept: PHYSICAL THERAPY | Facility: REHABILITATION | Age: 84
DRG: 291 | End: 2024-04-15
Attending: PHYSICAL MEDICINE & REHABILITATION
Payer: MEDICARE

## 2024-04-15 ENCOUNTER — APPOINTMENT (OUTPATIENT)
Dept: MEDICAL GROUP | Facility: MEDICAL CENTER | Age: 84
End: 2024-04-15
Payer: MEDICARE

## 2024-04-15 DIAGNOSIS — I48.19 PERSISTENT ATRIAL FIBRILLATION (HCC): ICD-10-CM

## 2024-04-15 DIAGNOSIS — I10 ESSENTIAL HYPERTENSION: ICD-10-CM

## 2024-04-15 LAB
APPEARANCE UR: ABNORMAL
BACTERIA #/AREA URNS HPF: ABNORMAL /HPF
BILIRUB UR QL STRIP.AUTO: NEGATIVE
COLOR UR: YELLOW
EPI CELLS #/AREA URNS HPF: ABNORMAL /HPF
GLUCOSE UR STRIP.AUTO-MCNC: 250 MG/DL
HYALINE CASTS #/AREA URNS LPF: ABNORMAL /LPF
KETONES UR STRIP.AUTO-MCNC: NEGATIVE MG/DL
LEUKOCYTE ESTERASE UR QL STRIP.AUTO: NEGATIVE
MICRO URNS: ABNORMAL
NITRITE UR QL STRIP.AUTO: NEGATIVE
PH UR STRIP.AUTO: 5.5 [PH] (ref 5–8)
PHOSPHATE SERPL-MCNC: 2.1 MG/DL (ref 2.5–4.5)
PROCALCITONIN SERPL-MCNC: 0.09 NG/ML
PROT UR QL STRIP: NEGATIVE MG/DL
RBC # URNS HPF: ABNORMAL /HPF
RBC UR QL AUTO: NEGATIVE
SP GR UR STRIP.AUTO: 1.02
UROBILINOGEN UR STRIP.AUTO-MCNC: 0.2 MG/DL
WBC #/AREA URNS HPF: ABNORMAL /HPF
YEAST BUDDING URNS QL: PRESENT /HPF

## 2024-04-15 PROCEDURE — 97602 WOUND(S) CARE NON-SELECTIVE: CPT

## 2024-04-15 PROCEDURE — 700102 HCHG RX REV CODE 250 W/ 637 OVERRIDE(OP): Performed by: GENERAL PRACTICE

## 2024-04-15 PROCEDURE — 97116 GAIT TRAINING THERAPY: CPT | Mod: CQ

## 2024-04-15 PROCEDURE — 94640 AIRWAY INHALATION TREATMENT: CPT

## 2024-04-15 PROCEDURE — 92611 MOTION FLUOROSCOPY/SWALLOW: CPT

## 2024-04-15 PROCEDURE — A9270 NON-COVERED ITEM OR SERVICE: HCPCS | Performed by: GENERAL PRACTICE

## 2024-04-15 PROCEDURE — A9270 NON-COVERED ITEM OR SERVICE: HCPCS | Performed by: PHYSICAL MEDICINE & REHABILITATION

## 2024-04-15 PROCEDURE — 97110 THERAPEUTIC EXERCISES: CPT | Mod: CO

## 2024-04-15 PROCEDURE — 94760 N-INVAS EAR/PLS OXIMETRY 1: CPT

## 2024-04-15 PROCEDURE — 97530 THERAPEUTIC ACTIVITIES: CPT | Mod: CQ

## 2024-04-15 PROCEDURE — 97129 THER IVNTJ 1ST 15 MIN: CPT

## 2024-04-15 PROCEDURE — 74230 X-RAY XM SWLNG FUNCJ C+: CPT

## 2024-04-15 PROCEDURE — 97130 THER IVNTJ EA ADDL 15 MIN: CPT

## 2024-04-15 PROCEDURE — 94669 MECHANICAL CHEST WALL OSCILL: CPT

## 2024-04-15 PROCEDURE — 770010 HCHG ROOM/CARE - REHAB SEMI PRIVAT*

## 2024-04-15 PROCEDURE — 36415 COLL VENOUS BLD VENIPUNCTURE: CPT

## 2024-04-15 PROCEDURE — 700102 HCHG RX REV CODE 250 W/ 637 OVERRIDE(OP): Performed by: PHYSICAL MEDICINE & REHABILITATION

## 2024-04-15 PROCEDURE — 700111 HCHG RX REV CODE 636 W/ 250 OVERRIDE (IP): Performed by: PHYSICAL MEDICINE & REHABILITATION

## 2024-04-15 PROCEDURE — 84145 PROCALCITONIN (PCT): CPT

## 2024-04-15 PROCEDURE — A9270 NON-COVERED ITEM OR SERVICE: HCPCS | Performed by: HOSPITALIST

## 2024-04-15 PROCEDURE — 97530 THERAPEUTIC ACTIVITIES: CPT | Mod: CO

## 2024-04-15 PROCEDURE — 92526 ORAL FUNCTION THERAPY: CPT

## 2024-04-15 PROCEDURE — 84100 ASSAY OF PHOSPHORUS: CPT

## 2024-04-15 PROCEDURE — 99232 SBSQ HOSP IP/OBS MODERATE 35: CPT | Performed by: HOSPITALIST

## 2024-04-15 PROCEDURE — 99231 SBSQ HOSP IP/OBS SF/LOW 25: CPT | Performed by: PHYSICAL MEDICINE & REHABILITATION

## 2024-04-15 PROCEDURE — 700102 HCHG RX REV CODE 250 W/ 637 OVERRIDE(OP): Performed by: HOSPITALIST

## 2024-04-15 RX ORDER — BISACODYL 10 MG
10 SUPPOSITORY, RECTAL RECTAL
Status: DISCONTINUED | OUTPATIENT
Start: 2024-04-15 | End: 2024-04-23 | Stop reason: HOSPADM

## 2024-04-15 RX ADMIN — FLUTICASONE FUROATE, UMECLIDINIUM BROMIDE AND VILANTEROL TRIFENATATE 1 PUFF: 100; 62.5; 25 POWDER RESPIRATORY (INHALATION) at 09:47

## 2024-04-15 RX ADMIN — POLYETHYLENE GLYCOL 3350 1 PACKET: 17 POWDER, FOR SOLUTION ORAL at 09:30

## 2024-04-15 RX ADMIN — DIBASIC SODIUM PHOSPHATE, MONOBASIC POTASSIUM PHOSPHATE AND MONOBASIC SODIUM PHOSPHATE 250 MG: 852; 155; 130 TABLET ORAL at 15:28

## 2024-04-15 RX ADMIN — MONTELUKAST 10 MG: 10 TABLET, FILM COATED ORAL at 20:55

## 2024-04-15 RX ADMIN — BISACODYL 10 MG: 10 SUPPOSITORY RECTAL at 17:16

## 2024-04-15 RX ADMIN — DIBASIC SODIUM PHOSPHATE, MONOBASIC POTASSIUM PHOSPHATE AND MONOBASIC SODIUM PHOSPHATE 250 MG: 852; 155; 130 TABLET ORAL at 20:55

## 2024-04-15 RX ADMIN — SERTRALINE HYDROCHLORIDE 50 MG: 50 TABLET ORAL at 09:22

## 2024-04-15 RX ADMIN — LEVOTHYROXINE SODIUM 37.5 MCG: 0.07 TABLET ORAL at 05:30

## 2024-04-15 RX ADMIN — TRAZODONE HYDROCHLORIDE 50 MG: 50 TABLET ORAL at 20:56

## 2024-04-15 RX ADMIN — MIRTAZAPINE 15 MG: 15 TABLET, ORALLY DISINTEGRATING ORAL at 20:56

## 2024-04-15 RX ADMIN — VIBEGRON 75 MG: 75 TABLET, FILM COATED ORAL at 09:22

## 2024-04-15 RX ADMIN — PREDNISONE 40 MG: 20 TABLET ORAL at 09:22

## 2024-04-15 RX ADMIN — DIBASIC SODIUM PHOSPHATE, MONOBASIC POTASSIUM PHOSPHATE AND MONOBASIC SODIUM PHOSPHATE 250 MG: 852; 155; 130 TABLET ORAL at 17:16

## 2024-04-15 RX ADMIN — RIVAROXABAN 15 MG: 15 TABLET, FILM COATED ORAL at 17:16

## 2024-04-15 RX ADMIN — DEXTROMETHORPHAN POLISTIREX 60 MG: 30 SUSPENSION ORAL at 20:54

## 2024-04-15 RX ADMIN — SENNOSIDES AND DOCUSATE SODIUM 1 TABLET: 50; 8.6 TABLET ORAL at 09:22

## 2024-04-15 RX ADMIN — DEXTROMETHORPHAN POLISTIREX 60 MG: 30 SUSPENSION ORAL at 09:22

## 2024-04-15 RX ADMIN — ATENOLOL 50 MG: 25 TABLET ORAL at 20:55

## 2024-04-15 SDOH — ECONOMIC STABILITY: TRANSPORTATION INSECURITY
IN THE PAST 12 MONTHS, HAS THE LACK OF TRANSPORTATION KEPT YOU FROM MEDICAL APPOINTMENTS OR FROM GETTING MEDICATIONS?: NO

## 2024-04-15 SDOH — ECONOMIC STABILITY: TRANSPORTATION INSECURITY
IN THE PAST 12 MONTHS, HAS LACK OF RELIABLE TRANSPORTATION KEPT YOU FROM MEDICAL APPOINTMENTS, MEETINGS, WORK OR FROM GETTING THINGS NEEDED FOR DAILY LIVING?: NO

## 2024-04-15 ASSESSMENT — ACTIVITIES OF DAILY LIVING (ADL)
BED_CHAIR_WHEELCHAIR_TRANSFER_DESCRIPTION: ADAPTIVE EQUIPMENT;INITIAL PREPARATION FOR TASK;SET-UP OF EQUIPMENT;SUPERVISION FOR SAFETY;VERBAL CUEING
BED_CHAIR_WHEELCHAIR_TRANSFER_DESCRIPTION: INCREASED TIME;SQUAT PIVOT TRANSFER TO WHEELCHAIR

## 2024-04-15 ASSESSMENT — ENCOUNTER SYMPTOMS
VOMITING: 0
COUGH: 0
MUSCULOSKELETAL NEGATIVE: 1
POLYDIPSIA: 0
BRUISES/BLEEDS EASILY: 0
NAUSEA: 0
MEMORY LOSS: 1
SHORTNESS OF BREATH: 0
EYES NEGATIVE: 1
ABDOMINAL PAIN: 0
CHILLS: 0
FEVER: 0
PALPITATIONS: 0

## 2024-04-15 ASSESSMENT — PAIN DESCRIPTION - PAIN TYPE: TYPE: ACUTE PAIN

## 2024-04-15 ASSESSMENT — GAIT ASSESSMENTS
DISTANCE (FEET): 120
DEVIATION: DECREASED HEEL STRIKE;DECREASED TOE OFF
GAIT LEVEL OF ASSIST: CONTACT GUARD ASSIST
ASSISTIVE DEVICE: FRONT WHEEL WALKER

## 2024-04-15 NOTE — THERAPY
Physical Therapy   Daily Treatment     Patient Name: Donte Magaña  Age:  83 y.o., Sex:  female  Medical Record #: [id number]  Today's Date: 4/15/2024     Precautions  Precautions: Fall Risk, Pacemaker Precautions  Comments: Pacemaker 2 years old per pt report    Subjective    Received pt from [facility], agreeable to session.     Objective       04/15/24 1431   PT Charge Group   PT Gait Training (Units) 1   PT Therapeutic Activities (Units) 1   Supervising Physical Therapist Wayne tSephens   PT Total Time Spent   PT Individual Total Time Spent (Mins) 30   Vitals   Pulse 95   Patient BP Position   (post ambulation)   Pulse Oximetry 94 %   O2 (LPM) 2   O2 Delivery Device Nasal Cannula   Cognition    Level of Consciousness Alert   Gait Functional Level of Assist    Gait Level Of Assist Contact Guard Assist   Assistive Device Front Wheel Walker   Distance (Feet) 120   # of Times Distance was Traveled 1   Deviation Decreased Heel Strike;Decreased Toe Off   Transfer Functional Level of Assist   Bed, Chair, Wheelchair Transfer Contact Guard Assist   Bed Chair Wheelchair Transfer Description Adaptive equipment;Initial preparation for task;Set-up of equipment;Supervision for safety;Verbal cueing  (SPT w/ FWW)   Bed Mobility    Sit to Supine Standby Assist   Sit to Stand Contact Guard Assist   Scooting Standby Assist   Interdisciplinary Plan of Care Collaboration   Patient Position at End of Therapy In Bed;Call Light within Reach;Tray Table within Reach;Phone within Reach     Discussed PT goal and PLOF, pt stated she doesn't ambulate at home; 98% using scooter and 2% using SPC in [facility].    Assessment    Pt tolerated session well, she has no goal of working on ambulation d/t main scooter user at [facility] but was able to ambulate w/ FWW and CGA. She is very pleasant and making jokes throughout session. Tolerated session well w/ 2L O2.      Strengths: Able to follow instructions, Motivated for self care and independence, Pleasant and  cooperative, Willingly participates in therapeutic activities (assisted- elevator access)  Barriers: Decreased endurance, Fatigue, Generalized weakness, Impaired activity tolerance, Impaired balance, Limited mobility (PLOF- primary scooter user)    Plan    Ambulation w/ FWW > trial SPC when able  Endurance  BLE strengthening    DME  PT DME Recommendations  Additional Equipment:  (Non aticipated; pt has FWW, SPC, scooter)    Passport items to be completed:  Get in/out of bed safely, in/out of a vehicle, safely use mobility device, walk or wheel around home/community, navigate up and down stairs, show how to get up/down from the ground, ensure home is accessible, demonstrate HEP, complete caregiver training    Physical Therapy Problems (Active)       Problem: Mobility       Dates: Start:  04/13/24         Goal: STG-Within one week, patient will propel wheelchair community >100 ft indoor SPV       Dates: Start:  04/13/24            Goal: STG-Within one week, patient will ambulate household distance 20 ft x 2 indoors with FWW       Dates: Start:  04/13/24               Problem: Mobility Transfers       Dates: Start:  04/13/24         Goal: STG-Within one week, patient will transfer bed to chair SPT FWW CGA       Dates: Start:  04/13/24               Problem: PT-Long Term Goals       Dates: Start:  04/13/24         Goal: LTG-By discharge, patient will propel wheelchair/ scooter mod I >150 ft over various surfaces       Dates: Start:  04/13/24            Goal: LTG-By discharge, patient will ambulate with FWW 50 ft x 2 indoors SPV-mod I        Dates: Start:  04/13/24            Goal: LTG-By discharge, patient will transfer one surface to another mod I with FWW       Dates: Start:  04/13/24

## 2024-04-15 NOTE — THERAPY
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  Pt consumed all textures with coughing noted on 2 occasions after a bite of food (6SBS).  Pt was noted to have a strong cough and was not in distress in either coughing occurrence.  It should be noted that pt was coughing prior to PO intake and her cough may not be related to eating/drinking (MBSS completed earlier this morning, results pending).      Strengths: Willingly participates in therapeutic activities, Pleasant and cooperative, Able to follow instructions, Effective communication skills  Barriers: Aspiration risk, Difficulty following instructions    Plan    Recommend pt continue on a 6- Soft & Bite Sized diet and 0-Thin liquids, trials of 7- Regular Textures to be completed as appropriate (pending MBSS results from this morning).          Speech Therapy Problems (Active)       Problem: Memory STGs       Dates: Start:  04/14/24         Goal: STG-Within one week, patient will complete the SCCAN with goals to follow.       Dates: Start:  04/14/24               Problem: Speech/Swallowing LTGs       Dates: Start:  04/14/24         Goal: LTG-By discharge, patient will safely swallow textures/consistencies determined by MBSS to be the least restrictive with no overt s/sx of aspiration, no decline in respiratory status.       Dates: Start:  04/14/24                Problem: Swallowing STGs       Dates: Start:  04/14/24         Goal: STG-Within one week, patient will participate in MBSS to inform dysphagia management.       Dates: Start:  04/14/24

## 2024-04-15 NOTE — PROGRESS NOTES
NURSING DAILY NOTE    Name: Donte Magaña   Date of Admission: 4/12/2024   Admitting Diagnosis: Acute diastolic heart failure (HCC)  Attending Physician: Lisa Mercedes D.o.  Allergies: Amiodarone, Bactrim, Cipro xr, Metoprolol, Morphine, Phytoplex z-guard [petrolatum-zinc oxide], Pseudoephedrine, Qvar [beclomethasone dipropionate], Vibramycin, Atorvastatin calcium-polysorbate 80, Augmentin, Diltiazem, Flecainide, Keflex, Mucinex, Tramadol, Atorvastatin, and Tape    Safety  Patient Assist  min  Patient Precautions  Fall Risk, Pacemaker Precautions  Precaution Comments  Pacemaker 2 years old per pt report  Bed Transfer Status  Moderate Assist  Toilet Transfer Status   Contact Guard Assist  Assistive Devices  Rails, Wheelchair, Gait Belt  Oxygen  Nasal Cannula  Diet/Therapeutic Dining  Current Diet Order   Procedures    Diet Order Diet: Level 6 - Soft and Bite Sized (cruch meds that can be crushe in puree. float others one at a time); Liquid level: Level 0 - Thin     Pill Administration  crushed  Agitated Behavioral Scale     ABS Level of Severity       Fall Risk  Has the patient had a fall this admission?   No  Renetta Chauhan Fall Risk Scoring  22, HIGH RISK  Fall Risk Safety Measures  bed alarm and chair alarm    Vitals  Temperature: 36.6 °C (97.8 °F)  Temp src: Oral  Pulse: 96  Respiration: 16  Blood Pressure : 138/65  Blood Pressure MAP (Calculated): 89 MM HG  BP Location: Left, Upper Arm  Patient BP Position: Supine     Oxygen  Pulse Oximetry: 92 %  O2 (LPM): 2  O2 Delivery Device: Nasal Cannula    Bowel and Bladder  Last Bowel Movement  04/09/24  Stool Type     Bowel Device  Bathroom, Diaper  Continent  Bladder:     Bowel:    Bladder Function  Urine Void (mL):  (large incontinent void)  Number of Times Voided: 1  Urine Color: Yellow  Urine Clarity: Clear  Urine Odor: Malodorous  Number of Times Incontinent of Urine: 0  Straight Catheter: 400  ml  Genitourinary Assessment   Bladder Assessment (WDL):  WDL Except  Urine Color: Yellow  Urine Clarity: Clear  Urine Odor: Malodorous  Number of Bladder Accidents: 1  Total Number of Bladder of Accidents in Last 7 Days: 1  Number of Times Incontinent of Urine: 0  Bladder Device: Diaper  Bladder Scan: Post Void  $ Bladder Scan Results (mL): 222    Skin  Ashutosh Score   18  Sensory Interventions   Skin Preventative Measures: Pillows in Use for Support / Positioning  Moisture Interventions  Moisturizers/Barriers: Barrier Wipes      Pain  Pain Rating Scale  0 - No Pain  Pain Location     Pain Location Orientation     Pain Interventions   Declines    ADLs    Bathing      Linen Change      Personal Hygiene  Change Edie Pads, Moist Edie Wipes, Perineal Care  Chlorhexidine Bath      Oral Care     Teeth/Dentures     Shave     Nutrition Percentage Eaten  Lunch, Between 50-75% Consumed  Environmental Precautions  Treaded Slipper Socks on Patient, Personal Belongings, Wastebasket, Call Bell etc. in Easy Reach, Transferred to Stronger Side, Report Given to Other Health Care Providers Regarding Fall Risk, Bed in Low Position  Patient Turns/Positioning  Left Side  Patient Turns Assistance/Tolerance     Bed Positions  Bed Controls On, Bed Locked  Head of Bed Elevated  Self regulated      Psychosocial/Neurologic Assessment  Psychosocial Assessment  Psychosocial (WDL):  Within Defined Limits  Patient Behaviors: Anxious  Neurologic Assessment  Neuro (WDL): Exceptions to WDL  Level of Consciousness: Alert  Orientation Level: Oriented to person  Cognition: Confused in conversation  Speech: Delayed responses  Pupil Assesment: No  R Pupil Size (mm): 3  R Pupil Shape / Description: Round  R Pupil Reaction: Brisk  L Pupil Size (mm): 3  L Pupil Shape / Description: Round  L Pupil Reaction: Brisk  EENT (WDL):  WDL Except    Cardio/Pulmonary Assessment  Edema      Respiratory Breath Sounds  RUL Breath Sounds: Diminished  RML Breath Sounds:  Diminished  RLL Breath Sounds: Diminished  DO Breath Sounds: Diminished  LLL Breath Sounds: Diminished  Cardiac Assessment   Cardiac (WDL):  WDL Except (Hx: Htn, Pacer, mvr, CAD, A-fib)

## 2024-04-15 NOTE — THERAPY
Occupational Therapy  Daily Treatment     Patient Name: Donte Magaña  Age:  83 y.o., Sex:  female  Medical Record #: [id number]  Today's Date: 4/15/2024     Precautions  Precautions: (P) Fall Risk, Pacemaker Precautions  Comments: Pacemaker 2 years old per pt report         Subjective    Pt was supine sleeping upon arrival, however was easily awoken and agreeable for OT session.      Objective       04/15/24 1401   OT Charge Group   OT Therapy Activity (Units) 1   OT Therapeutic Exercise (Units) 1   OT Total Time Spent   OT Individual Total Time Spent (Mins) 30   Precautions   Precautions Fall Risk;Pacemaker Precautions   Functional Level of Assist   Bed, Chair, Wheelchair Transfer Contact Guard Assist   Bed Chair Wheelchair Transfer Description Increased time;Squat pivot transfer to wheelchair   Sitting Upper Body Exercises   Comments Nustep ~10min w/ multiple rest breaks   Bed Mobility    Supine to Sit Minimal Assist   Scooting Contact Guard Assist   Interdisciplinary Plan of Care Collaboration   IDT Collaboration with  Physical Therapist Assistant (PTA);Nursing   Patient Position at End of Therapy Seated   Collaboration Comments handoff to PTA for next session. Wound care nurse completing tx upone arrival.     CGA for all functional transfers w/ squat pivot transfers.   Pt needed rest breaks during nustep d/t easily SOB and low activity tolerance. Pt O2 levels were hovering around 93-95% on 2L.      Assessment    Pt tolerated session well w/ focus on thera ex. Pt had a dry/wet coughing throughout treatment,however would clear after having some water.   Strengths: Able to follow instructions, Independent prior level of function, Motivated for self care and independence, Pleasant and cooperative, Willingly participates in therapeutic activities  Barriers: Decreased endurance, Fatigue, Generalized weakness, Hypotension, Impaired activity tolerance, Impaired balance, Impaired carryover of learning, Impaired  insight/denial of deficits, Impaired functional cognition, Limited mobility    Plan    ADLs w/ AE and IADLs  Strengthening/endurance  Standing balance/tolerance      DME  OT DME Recommendations  Additional Equipment:  (Non aticipated; pt has FWW, SPC, scooter)    Passport items to be completed:  Perform bathroom transfers, complete dressing, complete feeding, get ready for the day, prepare a simple meal, participate in household tasks, adapt home for safety needs, demonstrate home exercise program, complete caregiver training     Occupational Therapy Goals (Active)       Problem: Bathing       Dates: Start:  04/13/24         Goal: STG-Within one week, patient will bathe at SBA level using DME/AE PRN.       Dates: Start:  04/13/24               Problem: Dressing       Dates: Start:  04/13/24         Goal: STG-Within one week, patient will dress LB at CGA level using DME/AE PRN.       Dates: Start:  04/13/24               Problem: Functional Transfers       Dates: Start:  04/13/24         Goal: STG-Within one week, patient will transfer to toilet at SBA level using DME/AE PRN.       Dates: Start:  04/13/24            Goal: STG-Within one week, patient will transfer to step in shower at SBA level using DME/AE PRN.       Dates: Start:  04/13/24               Problem: OT Long Term Goals       Dates: Start:  04/13/24         Goal: LTG-By discharge, patient will complete basic self care tasks at SPV-Mod I level using DME/AE PRN.       Dates: Start:  04/13/24            Goal: LTG-By discharge, patient will perform bathroom transfers at SPV-Mod I level using DME/AE PRN.       Dates: Start:  04/13/24               Problem: Toileting       Dates: Start:  04/13/24         Goal: STG-Within one week, patient will complete toileting tasks at SBA level using DME/AE PRN.        Dates: Start:  04/13/24

## 2024-04-15 NOTE — PROGRESS NOTES
Hospital Medicine Daily Progress Note        Chief Complaint  Atrial Fibrillation, Hypertension    Interval Problem Update  RN reports pt was confused overnight, very sleepy this morning.  Labs reviewed.    Review of Systems  Review of Systems   Constitutional:  Negative for chills and fever.   HENT: Negative.     Eyes: Negative.    Respiratory:  Negative for cough and shortness of breath.    Cardiovascular:  Negative for chest pain and palpitations.   Gastrointestinal:  Negative for abdominal pain, nausea and vomiting.   Musculoskeletal: Negative.    Skin:  Negative for itching and rash.   Endo/Heme/Allergies:  Negative for polydipsia. Does not bruise/bleed easily.   Psychiatric/Behavioral:  Positive for memory loss.         Physical Exam  Temp:  [36.3 °C (97.3 °F)-36.6 °C (97.8 °F)] 36.6 °C (97.8 °F)  Pulse:  [80-96] 84  Resp:  [16] 16  BP: (109-138)/(64-69) 138/65  SpO2:  [92 %-98 %] 98 %    Physical Exam  Vitals reviewed.   Constitutional:       General: She is not in acute distress.     Appearance: Normal appearance. She is not ill-appearing.   HENT:      Head: Normocephalic and atraumatic.      Right Ear: External ear normal.      Left Ear: External ear normal.      Nose: Nose normal.      Mouth/Throat:      Pharynx: Oropharynx is clear.   Eyes:      General:         Right eye: No discharge.         Left eye: No discharge.      Extraocular Movements: Extraocular movements intact.      Conjunctiva/sclera: Conjunctivae normal.   Cardiovascular:      Rate and Rhythm: Normal rate and regular rhythm.   Pulmonary:      Effort: No respiratory distress.      Breath sounds: No wheezing.      Comments: Decreased BS  Abdominal:      General: Bowel sounds are normal. There is no distension.      Palpations: Abdomen is soft.      Tenderness: There is no abdominal tenderness.   Musculoskeletal:      Cervical back: Normal range of motion and neck supple.      Right lower leg: No edema.      Left lower leg: No edema.    Skin:     General: Skin is warm and dry.   Neurological:      Mental Status: She is alert.      Comments: somnolent         Fluids    Intake/Output Summary (Last 24 hours) at 4/15/2024 1036  Last data filed at 4/15/2024 0800  Gross per 24 hour   Intake 540 ml   Output 400 ml   Net 140 ml       Laboratory  Recent Labs     04/13/24  0615 04/14/24  0602   WBC 8.0 12.1*   RBC 4.24 4.13*   HEMOGLOBIN 13.6 13.1   HEMATOCRIT 42.7 40.5   .7* 98.1*   MCH 32.1 31.7   MCHC 31.9* 32.3   RDW 49.5 46.5   PLATELETCT 283 342   MPV 9.9 9.9     Recent Labs     04/13/24  0615 04/14/24  0602   SODIUM 137 137   POTASSIUM 4.6 4.7   CHLORIDE 102 100   CO2 25 24   GLUCOSE 96 132*   BUN 38* 46*   CREATININE 1.07 1.04   CALCIUM 9.3 9.5                   Assessment/Plan  Macrocytosis without anemia  Assessment & Plan  Folate testing no longer performed at this facility   Vit B12 normal  Likely related to hypothyroidism  Follow H/H  Check F/U labs in AM     Hypophosphatemia  Assessment & Plan  Resume supplement  Check F/U labs in a few days    Acute respiratory failure (HCC)- (present on admission)  Assessment & Plan  CT bilateral ground glass opacities, negative for PE  Echo EF 60%, grade II diastolic dysfunction, mild MR/mod AI  BNP worse off Lasix but stable  CXR negative acute  Soft Tissue Neck X-ray unremarkable  Treated w/ Azithromycin  Completing Prednisone  On Trelegy, Singulair, and Delsym  RT protocol    Essential hypertension- (present on admission)  Assessment & Plan  Observe blood pressure trends on Atenolol    Azotemia- (present on admission)  Assessment & Plan  2/2 overly aggressive diuresis at [facility]  Continue to hold Lasix, Aldactone, and Lisinopril  Closely follow renal function  Check F/U labs in AM     Mild Leukocytosis- (present on admission)  Assessment & Plan  Likely 2/2 steroids  PCT 0.09  UA 0-2 WBC, few bacteria, yeast present, request UCx  CXR negative acute  Follow Temp and WBC  Check F/U labs in AM      Multiple drug allergies- (present on admission)  Assessment & Plan  Extensive list noted    Persistent atrial fibrillation (HCC)- (present on admission)  Assessment & Plan  S/P PPM  On Atenolol for rate control  Anticoagulated on Xarelto    Mild episode of recurrent major depressive disorder (HCC)- (present on admission)  Assessment & Plan  On Zoloft    Hypothyroidism- (present on admission)  Assessment & Plan  TSH 0.18 and FT4 1.53  Decreased Synthroid  Outpt F/U       Full Code    Discussed w/ RN (Olivia) and Dr. Mercedes

## 2024-04-15 NOTE — FLOWSHEET NOTE
04/15/24 0947   Events/Summary/Plan   Events/Summary/Plan SpO2 check   Vital Signs   Pulse 84   Respiration 16   Pulse Oximetry 98 %   $ Pulse Oximetry (Spot Check) Yes   Respiratory Assessment   Respiratory Pattern Within Normal Limits   Level of Consciousness Alert   Chest Exam   Work Of Breathing / Effort Within Normal Limits   Breath Sounds   RUL Breath Sounds Clear   RML Breath Sounds Crackles   RLL Breath Sounds Crackles   DO Breath Sounds Clear   LLL Breath Sounds Crackles   Secretions   Cough Strong;Productive   How Sputum Obtained Spontaneous   Sputum Amount Unable to Evaluate   Oxygen   O2 (LPM) 2   O2 Delivery Device Nasal Cannula

## 2024-04-15 NOTE — PROGRESS NOTES
"  Physical Medicine & Rehabilitation Progress Note    Encounter Date: 4/15/2024    Chief Complaint: Doing okay    Interval Events (Subjective):  Vital signs stable  Bowel movement 4/14  Voiding, noted to be malodorous    Patient seen and examined in her room.  Doing okay.  Does not have any specific complaints at this time.    ROS: 14 point ROS negative unless otherwise specified in the HPI    Objective:  VITAL SIGNS: /65   Pulse 96   Temp 36.6 °C (97.8 °F) (Oral)   Resp 16   Ht 1.626 m (5' 4\")   Wt 77.1 kg (170 lb 1 oz)   LMP 01/10/1975   SpO2 92%   BMI 29.19 kg/m²     GEN: No apparent distress  HEENT: Head normocephalic, atraumatic.  Sclera nonicteric bilaterally, no ocular discharge appreciated bilaterally.  CV: Extremities warm and well-perfused, no peripheral edema appreciated bilaterally.  PULMONARY: Breathing nonlabored on supplemental oxygen.  SKIN: No appreciable skin breakdown on exposed areas of skin.  PSYCH: Mood and affect within normal limits.  NEURO: Awake alert.  Conversational.  Logical thought content.      Laboratory Values:  Recent Results (from the past 72 hour(s))   POCT glucose device results    Collection Time: 04/12/24 11:35 AM   Result Value Ref Range    POC Glucose, Blood 144 (H) 65 - 99 mg/dL   CBC with Differential    Collection Time: 04/13/24  6:15 AM   Result Value Ref Range    WBC 8.0 4.8 - 10.8 K/uL    RBC 4.24 4.20 - 5.40 M/uL    Hemoglobin 13.6 12.0 - 16.0 g/dL    Hematocrit 42.7 37.0 - 47.0 %    .7 (H) 81.4 - 97.8 fL    MCH 32.1 27.0 - 33.0 pg    MCHC 31.9 (L) 32.2 - 35.5 g/dL    RDW 49.5 35.9 - 50.0 fL    Platelet Count 283 164 - 446 K/uL    MPV 9.9 9.0 - 12.9 fL    Neutrophils-Polys 52.40 44.00 - 72.00 %    Lymphocytes 29.00 22.00 - 41.00 %    Monocytes 11.10 0.00 - 13.40 %    Eosinophils 6.10 0.00 - 6.90 %    Basophils 0.70 0.00 - 1.80 %    Immature Granulocytes 0.70 0.00 - 0.90 %    Nucleated RBC 0.00 0.00 - 0.20 /100 WBC    Neutrophils (Absolute) 4.19 " 1.82 - 7.42 K/uL    Lymphs (Absolute) 2.32 1.00 - 4.80 K/uL    Monos (Absolute) 0.89 (H) 0.00 - 0.85 K/uL    Eos (Absolute) 0.49 0.00 - 0.51 K/uL    Baso (Absolute) 0.06 0.00 - 0.12 K/uL    Immature Granulocytes (abs) 0.06 0.00 - 0.11 K/uL    NRBC (Absolute) 0.00 K/uL   Comp Metabolic Panel (CMP)    Collection Time: 04/13/24  6:15 AM   Result Value Ref Range    Sodium 137 135 - 145 mmol/L    Potassium 4.6 3.6 - 5.5 mmol/L    Chloride 102 96 - 112 mmol/L    Co2 25 20 - 33 mmol/L    Anion Gap 10.0 7.0 - 16.0    Glucose 96 65 - 99 mg/dL    Bun 38 (H) 8 - 22 mg/dL    Creatinine 1.07 0.50 - 1.40 mg/dL    Calcium 9.3 8.5 - 10.5 mg/dL    Correct Calcium 9.8 8.5 - 10.5 mg/dL    AST(SGOT) 16 12 - 45 U/L    ALT(SGPT) 7 2 - 50 U/L    Alkaline Phosphatase 69 30 - 99 U/L    Total Bilirubin 0.5 0.1 - 1.5 mg/dL    Albumin 3.4 3.2 - 4.9 g/dL    Total Protein 6.5 6.0 - 8.2 g/dL    Globulin 3.1 1.9 - 3.5 g/dL    A-G Ratio 1.1 g/dL   proBrain Natriuretic Peptide, NT    Collection Time: 04/13/24  6:15 AM   Result Value Ref Range    NT-proBNP 1233 (H) 0 - 125 pg/mL   Magnesium    Collection Time: 04/13/24  6:15 AM   Result Value Ref Range    Magnesium 2.0 1.5 - 2.5 mg/dL   Phosphorus    Collection Time: 04/13/24  6:15 AM   Result Value Ref Range    Phosphorus 2.1 (L) 2.5 - 4.5 mg/dL   TSH with Reflex to FT4    Collection Time: 04/13/24  6:15 AM   Result Value Ref Range    TSH 0.180 (L) 0.380 - 5.330 uIU/mL   Vitamin D, 25-hydroxy (blood)    Collection Time: 04/13/24  6:15 AM   Result Value Ref Range    25-Hydroxy   Vitamin D 25 63 30 - 100 ng/mL   ESTIMATED GFR    Collection Time: 04/13/24  6:15 AM   Result Value Ref Range    GFR (CKD-EPI) 51 (A) >60 mL/min/1.73 m 2   FREE THYROXINE    Collection Time: 04/13/24  6:15 AM   Result Value Ref Range    Free T-4 1.53 0.93 - 1.70 ng/dL   VITAMIN B12    Collection Time: 04/14/24  6:02 AM   Result Value Ref Range    Vitamin B12 -True Cobalamin 331 211 - 911 pg/mL   CBC WITHOUT DIFFERENTIAL     Collection Time: 04/14/24  6:02 AM   Result Value Ref Range    WBC 12.1 (H) 4.8 - 10.8 K/uL    RBC 4.13 (L) 4.20 - 5.40 M/uL    Hemoglobin 13.1 12.0 - 16.0 g/dL    Hematocrit 40.5 37.0 - 47.0 %    MCV 98.1 (H) 81.4 - 97.8 fL    MCH 31.7 27.0 - 33.0 pg    MCHC 32.3 32.2 - 35.5 g/dL    RDW 46.5 35.9 - 50.0 fL    Platelet Count 342 164 - 446 K/uL    MPV 9.9 9.0 - 12.9 fL   Basic Metabolic Panel    Collection Time: 04/14/24  6:02 AM   Result Value Ref Range    Sodium 137 135 - 145 mmol/L    Potassium 4.7 3.6 - 5.5 mmol/L    Chloride 100 96 - 112 mmol/L    Co2 24 20 - 33 mmol/L    Glucose 132 (H) 65 - 99 mg/dL    Bun 46 (H) 8 - 22 mg/dL    Creatinine 1.04 0.50 - 1.40 mg/dL    Calcium 9.5 8.5 - 10.5 mg/dL    Anion Gap 13.0 7.0 - 16.0   proBrain Natriuretic Peptide, NT    Collection Time: 04/14/24  6:02 AM   Result Value Ref Range    NT-proBNP 1144 (H) 0 - 125 pg/mL   ESTIMATED GFR    Collection Time: 04/14/24  6:02 AM   Result Value Ref Range    GFR (CKD-EPI) 53 (A) >60 mL/min/1.73 m 2   URINALYSIS    Collection Time: 04/14/24  5:50 PM    Specimen: Urine, Cath   Result Value Ref Range    Color Yellow     Character Cloudy (A)     Specific Gravity 1.020 <1.035    Ph 5.5 5.0 - 8.0    Glucose 250 (A) Negative mg/dL    Ketones Negative Negative mg/dL    Protein Negative Negative mg/dL    Bilirubin Negative Negative    Urobilinogen, Urine 0.2 Negative    Nitrite Negative Negative    Leukocyte Esterase Negative Negative    Occult Blood Negative Negative    Micro Urine Req Microscopic    URINE MICROSCOPIC (W/UA)    Collection Time: 04/14/24  5:50 PM   Result Value Ref Range    WBC 0-2 /hpf    RBC 0-2 /hpf    Bacteria Few (A) None /hpf    Epithelial Cells Few /hpf    Hyaline Cast 3-5 (A) /lpf    Budding Yeast Present (A) Absent /hpf   PROCALCITONIN    Collection Time: 04/15/24  6:02 AM   Result Value Ref Range    Procalcitonin 0.09 <0.25 ng/mL   PHOSPHORUS    Collection Time: 04/15/24  6:02 AM   Result Value Ref Range     Phosphorus 2.1 (L) 2.5 - 4.5 mg/dL       Medications:  Scheduled Medications   Medication Dose Frequency    senna-docusate  1 Tablet BID    polyethylene glycol/lytes  1 Packet DAILY    levothyroxine  37.5 mcg AM ES    montelukast  10 mg Nightly    Pharmacy Consult Request  1 Each PHARMACY TO DOSE    atenolol  50 mg Q EVENING    Dextromethorphan Polistirex ER  60 mg BID    fluticasone-umeclidinium-vilanterol  1 Puff QDAILY (RT)    mirtazapine  15 mg QHS    nystatin   BID    rivaroxaban  15 mg PM MEAL    sertraline  50 mg DAILY    vibegron  75 mg DAILY    predniSONE  40 mg DAILY     PRN medications: bisacodyl, Respiratory Therapy Consult, hydrALAZINE, carboxymethylcellulose, benzocaine-menthol, traZODone, sodium chloride, acetaminophen, ALPRAZolam, benzonatate, ipratropium-albuterol, menthol    Diet:  Current Diet Order   Procedures    Diet Order Diet: Level 6 - Soft and Bite Sized (cruch meds that can be crushe in puree. float others one at a time); Liquid level: Level 0 - Thin       Medical Decision Making and Plan:  Acute heart failure with preserved ejection fraction  PT and OT for mobility and ADLs. Per guidelines, 15 hours per week between PT, OT and/or SLP.  Follow-up cardiology  BNP in the a.m.  Prior to admission: Lasix 60 mg IV twice daily, lisinopril 5 mg daily, Aldactone 25 mg daily on HOLD due to hypotension from aggressive diuresis s/p gentle fluids  Hospitalist consult    Dysphagia  SLP following      Acute hypoxemic respiratory failure secondary to multifocal pneumonia seen on CT  Procalcitonin, WBC within normal limits.  Being treated with Zithromax through 4/14 for possible atypical pneumonia  RT to consult  Encourage IS  Pep therapy  DuoNeb as needed  Continue Delsym  Continue Trelegy Ellipta  Pulm recommendation just prior to rehab admission is for short course of prednisone 40 mg daily for 5 days.  Initiating 4/13.  Discussed with pulmonary, patient's unspecified allergy/reaction to beclomethasone  nonissue with starting prednisone. Discussed with patient as well. Patient states has many issues to many medications. None known to prednisone. Continuing with prescription with patient and family permission as this recommendation per Pulm was discussed with patient's DONALDOSUSI Huang.      Hypertension  Hypotension  Prior to admission: Lasix 60 mg IV twice daily, lisinopril 5 mg daily, Aldactone 25 mg daily on HOLD due to hypotension from aggressive diuresis s/p gentle fluids  4/15 VSS    Leukocytosis  UA culture PENDING     Atrial fibrillation  Continue Xarelto 15 mg daily  Continue atenolol 50 mg every evening     TANIA  Secondary to aggressive diuresis  Monitor labs - renal function stable/improving     Hypothyroidism  Continue Synthroid 50 mcg each morning     Pain  Tylenol as needed     Skin  Patient at risk for skin breakdown due to debility in areas including sacrum, achilles, elbows and head in addition to other sites. Nursing to assess skin daily.      Poor appetite  Depression  Continue mirtazapine 15 mg nightly  Continue sertraline 50 mg daily     Bowel   Patient on Senna-docusate for constipation prophylaxis.      Bladder  TV/PVR/BS PRN  Continue vibegron 75 mg daily     Fungal/yeast within skin folds  Continue nystatin powder through 4/15        Upcoming Labs/imagin/16     DVT PROPHYLAXIS: Xarelto 15 mg daily     HOSPITALIST FOLLOWING: Yes consulted on admission     CODE STATUS: Full code     DISPO: [place] to [place] versus [place]     ERLINDA: TBD     ADDITIONAL MEDICAL NEEDS ON D/C (IV abx, O2, etc): TBD     MEDS SENT TO: TBD     DISCHARGE SPECIALIST FOLLOW UP: Cardiology     Patient to scheduled follow up with their PCP within 2 weeks from discharge from the [facility].      ____________________________________    Dr. Lisa Mercedes DO, MS  [facility] - Physical Medicine & Rehabilitation   ____________________________________

## 2024-04-15 NOTE — CARE PLAN
Problem: VTE Prevention  Goal: Patient will remain free from venous thromboembolism (VTE)  Outcome: Progressing  Note: Pt is up and walking, participates in therapies, and up to table for all meals.   The patient is Stable - Low risk of patient condition declining or worsening    Shift Goals  Clinical Goals: Safety  Patient Goals: Rest    Progress made toward(s) clinical / shift goals:  no s/s dvt    Patient is not progressing towards the following goals:

## 2024-04-15 NOTE — DISCHARGE PLANNING
CASE MANAGEMENT INITIAL ASSESSMENT    Admit Date:  4/12/2024     I met with patient to discuss role of case management / discharge planning / team conference.   Patient is a  83 y.o. female transferred from [facility].    Diagnosis: Acute respiratory failure ([facility]) [J96.00]    Co-morbidities:   Patient Active Problem List    Diagnosis Date Noted    Hypophosphatemia 04/13/2024    Macrocytosis without anemia 04/13/2024    Acute respiratory failure ([facility]) 04/12/2024    Acute exacerbation of bronchiectasis ([facility]) 04/12/2024    Acute respiratory failure with hypoxia ([facility]) 04/07/2024    Pressure injury of sacral region, stage 2 ([facility]) 09/18/2023    Aortic valve regurgitation 01/20/2023    Generalized weakness 12/29/2022    Psoas hematoma, left, secondary to anticoagulant therapy, subsequent encounter 12/28/2022    Hypokalemia 12/28/2022    Advanced care planning/counseling discussion 10/24/2022    Decreased mobility 06/09/2022    PVD (peripheral vascular disease) ([facility]) 04/19/2021    Falls, initial encounter 01/29/2021    High risk medication use 05/01/2020    Essential hypertension 03/02/2020    Stage 2 chronic kidney disease 03/02/2020    Class 1 obesity due to excess calories with serious comorbidity and body mass index (BMI) of 31.0 to 31.9 in adult 03/02/2020    Urinary incontinence 12/31/2019    Cysts of both ovaries 10/30/2019    Multiple drug allergies 06/29/2018    Bilateral leg edema 06/29/2018    Mild Leukocytosis 06/29/2018    Azotemia 06/29/2018    Persistent atrial fibrillation (HCC) 10/31/2017    Cardiac pacemaker in situ 10/17/2016    Degenerative arthritis of knee, bilateral 06/23/2016    Mild episode of recurrent major depressive disorder (HCC) 07/30/2015    Coronary artery disease involving native coronary artery of native heart 10/07/2014    Hypothyroidism 05/21/2014    Chronic anticoagulation 11/14/2013    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/02/2013    Spinal stenosis, multilevel 07/02/2013    Pneumonia  02/15/2013    Glaucoma 05/03/2011    Macular degeneration 05/03/2011    Personal history of systemic lupus erythematosus (SLE) ([facility]) 10/23/2009     Prior Living Situation:  Housing / Facility: Assisted Living Residence (lives on second floor)  Lives with - Patient's Self Care Capacity: Alone and Unable to Care For Self ([facility] -> meals/cleaning)    Prior Level of Function:  Medication Management: Requires Assist  Finances: Dependent  Home Management: Dependent  Shopping: Dependent  Prior Level Of Mobility: Independent With Device in Community, Unable to Negotiate Steps in Community, Independent With Device in Home, Unable to Negotiate Steps in Home  Driving / Transportation: Relatives / Others Provide Transportation    Support Systems:  Primary : Reina-daughter: [phone] or [phone] or grandchild-Valerie: [phone].         Previous Services Utilized:   Equipment Owned: Scooter, Single Point Cane, Front-Wheel Walker  Prior Services: Housekeeping / Homemaker Services, Intermittent Physical Support for ADL Per Service    Other Information:  Occupation (Pre-Hospital Vocational): Retired Due To Age     Primary Payor Source: [employer]  Primary Care Practitioner : Jolie Brito MD    Patient / Family Goal:  Patient / Family Goal: Return home to [facility]    Plan:  1. Continue to follow patient through hospitalization and provide discharge planning in collaboration with patient, family, physicians and ancillary services.     2. Utilize community resources to ensure a safe discharge.

## 2024-04-15 NOTE — CARE PLAN
"  Problem: Fall Risk - Rehab  Goal: Patient will remain free from falls  Outcome: Progressing       Renetta Chauhan Fall risk Assessment Score: 22    High fall risk Interventions   - Alarming seatbelt  - Bed and strip alarm   - Yellow sign by the door   - Yellow wrist band \"Fall risk\"  - Room near to the nurse station  - Do not leave patient unattended in the bathroom  - Fall risk education provided       Problem: Bladder / Voiding  Goal: Patient will establish and maintain bladder regimen  Outcome: Progressing. UA pending.      "

## 2024-04-15 NOTE — THERAPY
"Speech Language Pathology  Video Swallow     Patient Name:  Donte Magaña  AGE:  83 y.o., SEX:  female  Medical Record #:  [id number]  Today's Date: 4/15/2024     Objective     04/15/24 1001   Charge Group   SLP Video Swallow / FEES Videofluoroscopic Evaluation   SLP Total Time Spent   SLP Individual Total Time Spent (Mins) 30   History / Background Information   Prior Level of Function for Eating / Swallowing (7) regular and (0) thin liquids   Diagnosis Acute heart failure (HCC)   Onset Date Of Dysphagia Concerns began 4/13   Dysphagia Symptoms Warranting Video Swallow She was referred to SLP for clinical swallow evaluation following a choking incident 4/13 that resulted in a rapid response. Patient had recent Dx of pneumonia concerning for risk of aspiration   General Anatomy / Physiology N/A   \"Dry\" / Saliva Swallow Observations N/A   Dentition Intact   Procedure   Patient Seated in  Wheelchair   Seated at (Degrees) 90   Views Completed Lateral   Fluoroscopy Time 100.8   Consistency   Consistency Thin Liquid;Mildly Thick Liquid;Pudding;Soft & Bite Sized;Regular Solid;Barium Tablet   Thin Liquid   PAS Score 1   Timing N/A   Vallecular Residue Trace (1%-5%)   Pyriform Sinus Residue Trace (1%-5%)   Mildly Thick Liquid   PAS Score 1   Timing N/A   Vallecular Residue Trace (1%-5%)   Pyriform Sinus Residue Trace (1%-5%)   Pudding   PAS Score 1   Timing N/A   Vallecular Residue Trace (1%-5%)   Pyriform Sinus Residue None (0%)   Soft & Bite Sized   PAS Score 1   Timing N/A   Vallecular Residue Mild (5%-25%)   Pyriform Sinus Residue Trace (1%-5%)   Regular Solid   PAS Score 1   Timing N/A   Vallecular Residue Mild (5%-25%)   Pyriform Sinus Residue Trace (1%-5%)   Barium Tablet   PAS Score 1   Timing N/A   Vallecular Residue Trace (1%-5%)   Pyriform Sinus Residue Trace (1%-5%)   Oral Phase   Oral Phase X   Mildly Thick (2) - (Nectar Thick) Oral Residue After the Swallow;Premature Spillage Into Valleculae;Tongue " Pumping;Delayed Oral Transit;Impaired Anterior / Posterior Bolus Movement   Thin (0) Premature Spillage Into Valleculae;Oral Residue After the Swallow;Tongue Pumping   Pureed (4) Oral Residue After the Swallow;Premature Spillage Into Valleculae;Tongue Pumping;Delayed Oral Transit;Impaired Anterior / Posterior Bolus Movement   Soft & Bite-Sized (6) - (Dysphagia III) Oral Residue After the Swallow;Premature Spillage Into Valleculae;Tongue Pumping;Impaired Anterior / Posterior Bolus Movement;Delayed Oral Transit   Regular (7) Oral Residue After the Swallow;Premature Spillage Into Valleculae;Tongue Pumping;Delayed Oral Transit;Impaired Anterior / Posterior Bolus Movement   Pharyngeal Phase   Pharyngeal Phase WDL   Additional Comments Trace to mild residue on all textures in pharyngeal structures, however, no further concern.   Compensatory Strategies Attempted   Compensatory Strategies Attempted Yes   Multiple Swallows Patient instructed to swallow again to clear trace-mild residue in pharyngeal structures.   Severity Rating   Severity Rating MCKENNA   MCKENNA Normal   Impression   Dysphagia Present Yes   Oral Mild Impairment   Additional Comments Mild impairment of cohesive bolus transport with piece meal degluttition of solids, but swallow is functional overall.   Prognosis   Prognosis for Improvement Not Applicable   Barriers to Improvement N/A   Positive Indicators for Improvement N/A   Recommendations   Diet / Liquid Recommendation Regular (7);Thin (0)   Medication Administration  Other (See Comments)  (Crushed all medications mixed in puree or floated whole in puree as necessary prior, however, patient efficiently swallowed barium capsule with (0) thin liquid wash with no difficulty.)   Strategies / Precautions No Talking while Eating;Clear Mouth Before Next Bite;Other (See Comments)  (Discussed with patient to clear mouth of food if she feels the need to sneeze or cough.)   Oral Care Pre- and post-meals   Interventions  None   SLP Contact Information (Name / Extension) Joceline Trujillo M.S., CCC-SLP   Interdisciplinary Plan of Care Collaboration   Patient Position at End of Therapy Seated;Chair Alarm On;Self Releasing Lap Belt Applied;Call Light within Reach;Phone within Reach;Tray Table within Reach       Discussed the risks, benefits, and alternatives of the VFSS procedure. Patient/family acknowledged and agreed to proceed.    Assessment    Oral phase (mild but functional): patient demonstrated mild oral residue, delayed oral transport, tongue pumping, piece meal deglutition of pudding, soft, and cookie, and premature spillage into the valleculae for all textures, however, cleared after second swallow.    Pharyngeal phase: Patient demonstrated trace-mild residue after primary swallow for all textures, however, residue was cleared after second swallow. Patient tolerated serial swallows of (0) thin liquids via straw with no difficulty. No penetration or aspiration was visualized during this study.    Overall, patient presents with mild (but functional) oral stage dysphagia. Recommend DC from dysphagia intervention and return to (7) regular textures and (0) thin liquids. Meds floated one at a time in puree or crushed and floated in puree. Recommended to patient to sit upright for meals in dining room and avoid taking food or drink if she feels a cough coming.    Consistency PAS Score Timing Residue Comments   Thin Liquid 1 N/A Vallecular Residue: (P) Trace (1%-5%)  Pyriform Sinus Residue: (P) Trace (1%-5%)    Mildly Thick 1 N/A Vallecular Residue: (P) Trace (1%-5%)  Pyriform Sinus Residue: (P) Trace (1%-5%)    Liquidised             Pudding 1 N/A Vallecular Residue: (P) Trace (1%-5%)  Pyriform Sinus Residue: (P) None (0%)    Soft & Bite Sized 1 N/A Vallecular Residue: (P) Mild (5%-25%)  Pyriform Sinus Residue: (P) Trace (1%-5%)    Regular Solid 1 N/A Vallecular Residue: (P) Mild (5%-25%)  Pyriform Sinus Residue: (P) Trace (1%-5%)     Mixed             Barium Tablet 1 N/A Vallecular Residue: (P) Trace (1%-5%)  Pyriform Sinus Residue: (P) Trace (1%-5%)      Penetration-Aspiration Scale (PAS)  1     No contrast enters airway  2     Contrast enters the airway, remains above the vocal folds, and is ejected from the airway (not seen in the airway at the end of the swallow).  3     Contrast enters the airway, remains above the vocal folds, and is not ejected from the airway (is seen in the airway after the swallow).  4     Contrast enters the airway, contacts the vocal folds, and is ejected from the airway.  5     Contrast enters the airway, contacts the vocal folds, and is not ejected from the airway  6     Contrast enters the airway, crosses the plane of the vocal folds, and is ejected from the airway.  7     Contrast enters the airway, crosses the plane of the vocal folds, and is not ejected from the airway despite effort.  8     Contrast enters the airway, crosses the plane of the vocal folds, is not ejected from the airway and there is no response to aspiration.     Plan    DC from dysphagia intervention. Meals up in wheelchair in dining room with (7) regular textures and (0) thin liquids, straws ok.

## 2024-04-15 NOTE — PROGRESS NOTES
Straight cathed for UA order. 400cc clear yellow urine output drained. Pt tolerated well. Denies needs at this time.

## 2024-04-16 ENCOUNTER — APPOINTMENT (OUTPATIENT)
Dept: OCCUPATIONAL THERAPY | Facility: REHABILITATION | Age: 84
DRG: 291 | End: 2024-04-16
Attending: PHYSICAL MEDICINE & REHABILITATION
Payer: MEDICARE

## 2024-04-16 ENCOUNTER — APPOINTMENT (OUTPATIENT)
Dept: PHYSICAL THERAPY | Facility: REHABILITATION | Age: 84
DRG: 291 | End: 2024-04-16
Attending: PHYSICAL MEDICINE & REHABILITATION
Payer: MEDICARE

## 2024-04-16 ENCOUNTER — APPOINTMENT (OUTPATIENT)
Dept: SPEECH THERAPY | Facility: REHABILITATION | Age: 84
DRG: 291 | End: 2024-04-16
Attending: GENERAL PRACTICE
Payer: MEDICARE

## 2024-04-16 LAB
ANION GAP SERPL CALC-SCNC: 9 MMOL/L (ref 7–16)
BASOPHILS # BLD AUTO: 0.7 % (ref 0–1.8)
BASOPHILS # BLD: 0.08 K/UL (ref 0–0.12)
BUN SERPL-MCNC: 38 MG/DL (ref 8–22)
CALCIUM SERPL-MCNC: 9.8 MG/DL (ref 8.5–10.5)
CHLORIDE SERPL-SCNC: 102 MMOL/L (ref 96–112)
CO2 SERPL-SCNC: 32 MMOL/L (ref 20–33)
CREAT SERPL-MCNC: 0.84 MG/DL (ref 0.5–1.4)
EOSINOPHIL # BLD AUTO: 0.08 K/UL (ref 0–0.51)
EOSINOPHIL NFR BLD: 0.7 % (ref 0–6.9)
ERYTHROCYTE [DISTWIDTH] IN BLOOD BY AUTOMATED COUNT: 48.1 FL (ref 35.9–50)
GFR SERPLBLD CREATININE-BSD FMLA CKD-EPI: 68 ML/MIN/1.73 M 2
GLUCOSE SERPL-MCNC: 128 MG/DL (ref 65–99)
HCT VFR BLD AUTO: 40.3 % (ref 37–47)
HGB BLD-MCNC: 13.1 G/DL (ref 12–16)
IMM GRANULOCYTES # BLD AUTO: 0.18 K/UL (ref 0–0.11)
IMM GRANULOCYTES NFR BLD AUTO: 1.6 % (ref 0–0.9)
LYMPHOCYTES # BLD AUTO: 3.16 K/UL (ref 1–4.8)
LYMPHOCYTES NFR BLD: 27.6 % (ref 22–41)
MCH RBC QN AUTO: 32.2 PG (ref 27–33)
MCHC RBC AUTO-ENTMCNC: 32.5 G/DL (ref 32.2–35.5)
MCV RBC AUTO: 99 FL (ref 81.4–97.8)
MONOCYTES # BLD AUTO: 0.89 K/UL (ref 0–0.85)
MONOCYTES NFR BLD AUTO: 7.8 % (ref 0–13.4)
NEUTROPHILS # BLD AUTO: 7.05 K/UL (ref 1.82–7.42)
NEUTROPHILS NFR BLD: 61.6 % (ref 44–72)
NRBC # BLD AUTO: 0 K/UL
NRBC BLD-RTO: 0 /100 WBC (ref 0–0.2)
PLATELET # BLD AUTO: 374 K/UL (ref 164–446)
PMV BLD AUTO: 9.7 FL (ref 9–12.9)
POTASSIUM SERPL-SCNC: 5.1 MMOL/L (ref 3.6–5.5)
RBC # BLD AUTO: 4.07 M/UL (ref 4.2–5.4)
SODIUM SERPL-SCNC: 143 MMOL/L (ref 135–145)
WBC # BLD AUTO: 11.4 K/UL (ref 4.8–10.8)

## 2024-04-16 PROCEDURE — 97130 THER IVNTJ EA ADDL 15 MIN: CPT

## 2024-04-16 PROCEDURE — A9270 NON-COVERED ITEM OR SERVICE: HCPCS | Performed by: HOSPITALIST

## 2024-04-16 PROCEDURE — 94760 N-INVAS EAR/PLS OXIMETRY 1: CPT

## 2024-04-16 PROCEDURE — 80048 BASIC METABOLIC PNL TOTAL CA: CPT

## 2024-04-16 PROCEDURE — 700102 HCHG RX REV CODE 250 W/ 637 OVERRIDE(OP): Performed by: PHYSICAL MEDICINE & REHABILITATION

## 2024-04-16 PROCEDURE — 700111 HCHG RX REV CODE 636 W/ 250 OVERRIDE (IP): Performed by: PHYSICAL MEDICINE & REHABILITATION

## 2024-04-16 PROCEDURE — 700102 HCHG RX REV CODE 250 W/ 637 OVERRIDE(OP): Performed by: GENERAL PRACTICE

## 2024-04-16 PROCEDURE — 99232 SBSQ HOSP IP/OBS MODERATE 35: CPT | Performed by: HOSPITALIST

## 2024-04-16 PROCEDURE — 700102 HCHG RX REV CODE 250 W/ 637 OVERRIDE(OP): Performed by: HOSPITALIST

## 2024-04-16 PROCEDURE — 97530 THERAPEUTIC ACTIVITIES: CPT

## 2024-04-16 PROCEDURE — 97110 THERAPEUTIC EXERCISES: CPT | Mod: CQ

## 2024-04-16 PROCEDURE — 770010 HCHG ROOM/CARE - REHAB SEMI PRIVAT*

## 2024-04-16 PROCEDURE — 97110 THERAPEUTIC EXERCISES: CPT

## 2024-04-16 PROCEDURE — 85025 COMPLETE CBC W/AUTO DIFF WBC: CPT

## 2024-04-16 PROCEDURE — 94640 AIRWAY INHALATION TREATMENT: CPT

## 2024-04-16 PROCEDURE — 97530 THERAPEUTIC ACTIVITIES: CPT | Mod: CQ

## 2024-04-16 PROCEDURE — 97535 SELF CARE MNGMENT TRAINING: CPT

## 2024-04-16 PROCEDURE — 94669 MECHANICAL CHEST WALL OSCILL: CPT

## 2024-04-16 PROCEDURE — A9270 NON-COVERED ITEM OR SERVICE: HCPCS | Performed by: GENERAL PRACTICE

## 2024-04-16 PROCEDURE — A9270 NON-COVERED ITEM OR SERVICE: HCPCS | Performed by: PHYSICAL MEDICINE & REHABILITATION

## 2024-04-16 PROCEDURE — 97129 THER IVNTJ 1ST 15 MIN: CPT

## 2024-04-16 PROCEDURE — 36415 COLL VENOUS BLD VENIPUNCTURE: CPT

## 2024-04-16 RX ORDER — FUROSEMIDE 40 MG/1
40 TABLET ORAL 2 TIMES DAILY
Qty: 200 TABLET | Refills: 0 | Status: SHIPPED | OUTPATIENT
Start: 2024-04-16 | End: 2024-04-22

## 2024-04-16 RX ORDER — ATENOLOL 50 MG/1
50 TABLET ORAL EVERY EVENING
Qty: 100 TABLET | Refills: 0 | Status: SHIPPED | OUTPATIENT
Start: 2024-04-16 | End: 2024-04-22

## 2024-04-16 RX ADMIN — MONTELUKAST 10 MG: 10 TABLET, FILM COATED ORAL at 20:23

## 2024-04-16 RX ADMIN — DEXTROMETHORPHAN POLISTIREX 60 MG: 30 SUSPENSION ORAL at 08:43

## 2024-04-16 RX ADMIN — MIRTAZAPINE 15 MG: 15 TABLET, ORALLY DISINTEGRATING ORAL at 20:23

## 2024-04-16 RX ADMIN — SERTRALINE HYDROCHLORIDE 50 MG: 50 TABLET ORAL at 08:44

## 2024-04-16 RX ADMIN — DIBASIC SODIUM PHOSPHATE, MONOBASIC POTASSIUM PHOSPHATE AND MONOBASIC SODIUM PHOSPHATE 250 MG: 852; 155; 130 TABLET ORAL at 20:23

## 2024-04-16 RX ADMIN — DIBASIC SODIUM PHOSPHATE, MONOBASIC POTASSIUM PHOSPHATE AND MONOBASIC SODIUM PHOSPHATE 250 MG: 852; 155; 130 TABLET ORAL at 15:24

## 2024-04-16 RX ADMIN — RIVAROXABAN 15 MG: 15 TABLET, FILM COATED ORAL at 17:33

## 2024-04-16 RX ADMIN — LEVOTHYROXINE SODIUM 37.5 MCG: 0.07 TABLET ORAL at 05:49

## 2024-04-16 RX ADMIN — DIBASIC SODIUM PHOSPHATE, MONOBASIC POTASSIUM PHOSPHATE AND MONOBASIC SODIUM PHOSPHATE 250 MG: 852; 155; 130 TABLET ORAL at 08:44

## 2024-04-16 RX ADMIN — POLYETHYLENE GLYCOL 3350 1 PACKET: 17 POWDER, FOR SOLUTION ORAL at 08:47

## 2024-04-16 RX ADMIN — FLUTICASONE FUROATE, UMECLIDINIUM BROMIDE AND VILANTEROL TRIFENATATE 1 PUFF: 100; 62.5; 25 POWDER RESPIRATORY (INHALATION) at 08:51

## 2024-04-16 RX ADMIN — SENNOSIDES AND DOCUSATE SODIUM 1 TABLET: 50; 8.6 TABLET ORAL at 08:44

## 2024-04-16 RX ADMIN — DEXTROMETHORPHAN POLISTIREX 60 MG: 30 SUSPENSION ORAL at 20:23

## 2024-04-16 RX ADMIN — ATENOLOL 50 MG: 25 TABLET ORAL at 20:23

## 2024-04-16 RX ADMIN — VIBEGRON 75 MG: 75 TABLET, FILM COATED ORAL at 15:24

## 2024-04-16 RX ADMIN — PREDNISONE 40 MG: 20 TABLET ORAL at 08:44

## 2024-04-16 ASSESSMENT — ACTIVITIES OF DAILY LIVING (ADL)
BED_CHAIR_WHEELCHAIR_TRANSFER_DESCRIPTION: ADAPTIVE EQUIPMENT;SET-UP OF EQUIPMENT;SUPERVISION FOR SAFETY
TOILET_TRANSFER_DESCRIPTION: GRAB BAR;SET-UP OF EQUIPMENT;SUPERVISION FOR SAFETY
BED_CHAIR_WHEELCHAIR_TRANSFER_DESCRIPTION: ADAPTIVE EQUIPMENT;INCREASED TIME;INITIAL PREPARATION FOR TASK;SUPERVISION FOR SAFETY
TOILET_TRANSFER_DESCRIPTION: GRAB BAR;INCREASED TIME;INITIAL PREPARATION FOR TASK;SET-UP OF EQUIPMENT;SUPERVISION FOR SAFETY
TOILETING_LEVEL_OF_ASSIST_DESCRIPTION: GRAB BAR;SET-UP OF EQUIPMENT;SUPERVISION FOR SAFETY

## 2024-04-16 ASSESSMENT — PAIN DESCRIPTION - PAIN TYPE: TYPE: ACUTE PAIN

## 2024-04-16 ASSESSMENT — ENCOUNTER SYMPTOMS
CHILLS: 0
FEVER: 0
NERVOUS/ANXIOUS: 0
SHORTNESS OF BREATH: 0
NAUSEA: 0
DIARRHEA: 0
VOMITING: 0
ABDOMINAL PAIN: 0

## 2024-04-16 NOTE — CARE PLAN
Problem: Bowel Elimination  Goal: Patient will participate in bowel management program  Outcome: Progressing  Note: Patient uses bathroom during bowel elimination. Will continue to monitor   The patient is Stable - Low risk of patient condition declining or worsening    Shift Goals  Clinical Goals: safety  Patient Goals: sleep well    Progress made toward(s) clinical / shift goals:  pt hadn't had BM for several days, but had bowel program yesterday that resulted in several bowel movements    Patient is not progressing towards the following goals:

## 2024-04-16 NOTE — FLOWSHEET NOTE
04/16/24 0850   Events/Summary/Plan   Events/Summary/Plan SpO2 check, Begin room air trial   Vital Signs   Pulse 84   Respiration 16   Pulse Oximetry 94 %   $ Pulse Oximetry (Spot Check) Yes   Secretions   Cough Strong;Non Productive   How Sputum Obtained Spontaneous   Oxygen   O2 (LPM) 1   O2 Delivery Device Nasal Cannula

## 2024-04-16 NOTE — FLOWSHEET NOTE
04/16/24 0955   Events/Summary/Plan   Events/Summary/Plan SpO2 check on room air. Place Pt back on 1 lpm   Skin Integrity Intact   Vital Signs   Pulse 84   Respiration 16   Pulse Oximetry 88 %   $ Pulse Oximetry (Spot Check) Yes   Oxygen   O2 Delivery Device None - Room Air   Room Air Challenge Fail  (Room air SpO2=88%)

## 2024-04-16 NOTE — PROGRESS NOTES
NURSING DAILY NOTE    Name: Donte Magaña   Date of Admission: 4/12/2024   Admitting Diagnosis: Acute diastolic heart failure (HCC)  Attending Physician: Lisa Mercedes D.o.  Allergies: Amiodarone, Bactrim, Cipro xr, Metoprolol, Morphine, Phytoplex z-guard [petrolatum-zinc oxide], Pseudoephedrine, Qvar [beclomethasone dipropionate], Vibramycin, Atorvastatin calcium-polysorbate 80, Augmentin, Diltiazem, Flecainide, Keflex, Mucinex, Tramadol, Atorvastatin, and Tape    Safety  Patient Assist  min  Patient Precautions  Fall Risk, Pacemaker Precautions  Precaution Comments  Pacemaker 2 years old per pt report  Bed Transfer Status  Contact Guard Assist  Toilet Transfer Status   Contact Guard Assist  Assistive Devices  Rails, Wheelchair  Oxygen  Nasal Cannula  Diet/Therapeutic Dining  Current Diet Order   Procedures    Diet Order Diet: Regular (cruch meds that can be crushe in puree. float others one at a time,  up in wheel chair and in dining room for all meals)     Pill Administration  crushed  Agitated Behavioral Scale     ABS Level of Severity       Fall Risk  Has the patient had a fall this admission?   No  Renetta Chauhan Fall Risk Scoring  22, HIGH RISK  Fall Risk Safety Measures  bed alarm, chair alarm, and poor balance    Vitals  Temperature: 37.2 °C (98.9 °F)  Temp src: Oral  Pulse: 85  Respiration: 16  Blood Pressure : (!) 150/77  Blood Pressure MAP (Calculated): 101 MM HG  BP Location: Right, Upper Arm  Patient BP Position: Supine     Oxygen  Pulse Oximetry: 97 %  O2 (LPM): 2  O2 Delivery Device: Nasal Cannula    Bowel and Bladder  Last Bowel Movement  04/09/24  Stool Type  Type 6: Fluffy pieces with ragged edges, a mushy stool  Bowel Device  Bathroom, Diaper  Continent  Bladder:     Bowel:    Bladder Function  Urine Void (mL):  (large incontinent void)  Number of Times Voided: 1  Urine Color: Yellow  Urine Clarity: Clear  Urine Odor:  Malodorous  Number of Times Incontinent of Urine: 0  Straight Catheter: 400 ml  Genitourinary Assessment   Bladder Assessment (WDL):  WDL Except  Urine Color: Yellow  Urine Clarity: Clear  Urine Odor: Malodorous  Number of Bladder Accidents: 1  Total Number of Bladder of Accidents in Last 7 Days: 1  Number of Times Incontinent of Urine: 0  Bladder Device: Bathroom, Diaper  Bladder Scan: Post Void  $ Bladder Scan Results (mL): 26    Skin  Ashutosh Score   18  Sensory Interventions   Bed Types: Low Airlo  Skin Preventative Measures: Pillows in Use for Support / Positioning  Moisture Interventions  Moisturizers/Barriers: Barrier Wipes      Pain  Pain Rating Scale  0 - No Pain  Pain Location     Pain Location Orientation     Pain Interventions   Declines    ADLs    Bathing      Linen Change      Personal Hygiene  Change Edie Pads, Moist Edie Wipes, Perineal Care  Chlorhexidine Bath      Oral Care  Brushed Teeth (Self)  Teeth/Dentures     Shave     Nutrition Percentage Eaten  *  * Meal *  *, Dinner, Between % Consumed  Environmental Precautions  Treaded Slipper Socks on Patient, Personal Belongings, Wastebasket, Call Bell etc. in Easy Reach, Transferred to Stronger Side, Report Given to Other Health Care Providers Regarding Fall Risk, Bed in Low Position  Patient Turns/Positioning  Patient Turns Self from Side to Side  Patient Turns Assistance/Tolerance     Bed Positions  Bed Controls On  Head of Bed Elevated  Self regulated      Psychosocial/Neurologic Assessment  Psychosocial Assessment  Psychosocial (WDL):  Within Defined Limits  Patient Behaviors: Anxious  Neurologic Assessment  Neuro (WDL): Exceptions to WDL  Level of Consciousness: Alert  Orientation Level: Oriented to person, Oriented to situation, Disoriented to place, Disoriented to time  Cognition: Confused in conversation  Speech: Delayed responses  Pupil Assesment: No  R Pupil Size (mm): 3  R Pupil Shape / Description: Round  R Pupil Reaction: Brisk  L  Pupil Size (mm): 3  L Pupil Shape / Description: Round  L Pupil Reaction: Brisk  EENT (WDL):  WDL Except    Cardio/Pulmonary Assessment  Edema      Respiratory Breath Sounds  RUL Breath Sounds: Clear  RML Breath Sounds: Crackles  RLL Breath Sounds: Crackles  DO Breath Sounds: Clear  LLL Breath Sounds: Crackles  Cardiac Assessment   Cardiac (WDL):  WDL Except (hx htn, pacer, mvr, cad, afib)

## 2024-04-16 NOTE — PROGRESS NOTES
"  Physical Medicine & Rehabilitation Progress Note    Encounter Date: 4/16/2024    Chief Complaint: Doing well    Interval Events (Subjective):  VS: level of supp O2 reducing down to 1 more consistently. Intermittent elevated SBP 150s  Multiple BM 4/16  Voiding    Patient seen and examined in the cafe. She has no complaints.     ROS: 14 point ROS negative unless otherwise specified in the HPI    Objective:  VITAL SIGNS: /88   Pulse 69   Temp 36.7 °C (98 °F) (Oral)   Resp 18   Ht 1.626 m (5' 4\")   Wt 77.1 kg (170 lb 1 oz)   LMP 01/10/1975   SpO2 93%   BMI 29.19 kg/m²     GEN: No apparent distress  HEENT: Head normocephalic, atraumatic.  Sclera nonicteric bilaterally, no ocular discharge appreciated bilaterally.  CV: Extremities warm and well-perfused, RLE swelling compared to left.   PULMONARY: Breathing nonlabored on supp O2  SKIN: No appreciable skin breakdown on exposed areas of skin.  PSYCH: Mood and affect within normal limits.  NEURO: Awake alert.  Conversational.  Logical thought content.    Laboratory Values:  Recent Results (from the past 72 hour(s))   VITAMIN B12    Collection Time: 04/14/24  6:02 AM   Result Value Ref Range    Vitamin B12 -True Cobalamin 331 211 - 911 pg/mL   CBC WITHOUT DIFFERENTIAL    Collection Time: 04/14/24  6:02 AM   Result Value Ref Range    WBC 12.1 (H) 4.8 - 10.8 K/uL    RBC 4.13 (L) 4.20 - 5.40 M/uL    Hemoglobin 13.1 12.0 - 16.0 g/dL    Hematocrit 40.5 37.0 - 47.0 %    MCV 98.1 (H) 81.4 - 97.8 fL    MCH 31.7 27.0 - 33.0 pg    MCHC 32.3 32.2 - 35.5 g/dL    RDW 46.5 35.9 - 50.0 fL    Platelet Count 342 164 - 446 K/uL    MPV 9.9 9.0 - 12.9 fL   Basic Metabolic Panel    Collection Time: 04/14/24  6:02 AM   Result Value Ref Range    Sodium 137 135 - 145 mmol/L    Potassium 4.7 3.6 - 5.5 mmol/L    Chloride 100 96 - 112 mmol/L    Co2 24 20 - 33 mmol/L    Glucose 132 (H) 65 - 99 mg/dL    Bun 46 (H) 8 - 22 mg/dL    Creatinine 1.04 0.50 - 1.40 mg/dL    Calcium 9.5 8.5 - 10.5 " mg/dL    Anion Gap 13.0 7.0 - 16.0   proBrain Natriuretic Peptide, NT    Collection Time: 04/14/24  6:02 AM   Result Value Ref Range    NT-proBNP 1144 (H) 0 - 125 pg/mL   ESTIMATED GFR    Collection Time: 04/14/24  6:02 AM   Result Value Ref Range    GFR (CKD-EPI) 53 (A) >60 mL/min/1.73 m 2   URINALYSIS    Collection Time: 04/14/24  5:50 PM    Specimen: Urine, Cath   Result Value Ref Range    Color Yellow     Character Cloudy (A)     Specific Gravity 1.020 <1.035    Ph 5.5 5.0 - 8.0    Glucose 250 (A) Negative mg/dL    Ketones Negative Negative mg/dL    Protein Negative Negative mg/dL    Bilirubin Negative Negative    Urobilinogen, Urine 0.2 Negative    Nitrite Negative Negative    Leukocyte Esterase Negative Negative    Occult Blood Negative Negative    Micro Urine Req Microscopic    URINE MICROSCOPIC (W/UA)    Collection Time: 04/14/24  5:50 PM   Result Value Ref Range    WBC 0-2 /hpf    RBC 0-2 /hpf    Bacteria Few (A) None /hpf    Epithelial Cells Few /hpf    Hyaline Cast 3-5 (A) /lpf    Budding Yeast Present (A) Absent /hpf   PROCALCITONIN    Collection Time: 04/15/24  6:02 AM   Result Value Ref Range    Procalcitonin 0.09 <0.25 ng/mL   PHOSPHORUS    Collection Time: 04/15/24  6:02 AM   Result Value Ref Range    Phosphorus 2.1 (L) 2.5 - 4.5 mg/dL   CBC WITH DIFFERENTIAL    Collection Time: 04/16/24  6:51 AM   Result Value Ref Range    WBC 11.4 (H) 4.8 - 10.8 K/uL    RBC 4.07 (L) 4.20 - 5.40 M/uL    Hemoglobin 13.1 12.0 - 16.0 g/dL    Hematocrit 40.3 37.0 - 47.0 %    MCV 99.0 (H) 81.4 - 97.8 fL    MCH 32.2 27.0 - 33.0 pg    MCHC 32.5 32.2 - 35.5 g/dL    RDW 48.1 35.9 - 50.0 fL    Platelet Count 374 164 - 446 K/uL    MPV 9.7 9.0 - 12.9 fL    Neutrophils-Polys 61.60 44.00 - 72.00 %    Lymphocytes 27.60 22.00 - 41.00 %    Monocytes 7.80 0.00 - 13.40 %    Eosinophils 0.70 0.00 - 6.90 %    Basophils 0.70 0.00 - 1.80 %    Immature Granulocytes 1.60 (H) 0.00 - 0.90 %    Nucleated RBC 0.00 0.00 - 0.20 /100 WBC     Neutrophils (Absolute) 7.05 1.82 - 7.42 K/uL    Lymphs (Absolute) 3.16 1.00 - 4.80 K/uL    Monos (Absolute) 0.89 (H) 0.00 - 0.85 K/uL    Eos (Absolute) 0.08 0.00 - 0.51 K/uL    Baso (Absolute) 0.08 0.00 - 0.12 K/uL    Immature Granulocytes (abs) 0.18 (H) 0.00 - 0.11 K/uL    NRBC (Absolute) 0.00 K/uL   Basic Metabolic Panel    Collection Time: 04/16/24  6:51 AM   Result Value Ref Range    Sodium 143 135 - 145 mmol/L    Potassium 5.1 3.6 - 5.5 mmol/L    Chloride 102 96 - 112 mmol/L    Co2 32 20 - 33 mmol/L    Glucose 128 (H) 65 - 99 mg/dL    Bun 38 (H) 8 - 22 mg/dL    Creatinine 0.84 0.50 - 1.40 mg/dL    Calcium 9.8 8.5 - 10.5 mg/dL    Anion Gap 9.0 7.0 - 16.0   ESTIMATED GFR    Collection Time: 04/16/24  6:51 AM   Result Value Ref Range    GFR (CKD-EPI) 68 >60 mL/min/1.73 m 2       Medications:  Scheduled Medications   Medication Dose Frequency    phosphorus  250 mg TID    senna-docusate  1 Tablet BID    polyethylene glycol/lytes  1 Packet DAILY    levothyroxine  37.5 mcg AM ES    montelukast  10 mg Nightly    Pharmacy Consult Request  1 Each PHARMACY TO DOSE    atenolol  50 mg Q EVENING    Dextromethorphan Polistirex ER  60 mg BID    fluticasone-umeclidinium-vilanterol  1 Puff QDAILY (RT)    mirtazapine  15 mg QHS    rivaroxaban  15 mg PM MEAL    sertraline  50 mg DAILY    vibegron  75 mg DAILY    predniSONE  40 mg DAILY     PRN medications: bisacodyl, Respiratory Therapy Consult, hydrALAZINE, carboxymethylcellulose, benzocaine-menthol, traZODone, sodium chloride, acetaminophen, ALPRAZolam, benzonatate, ipratropium-albuterol, menthol    Diet:  Current Diet Order   Procedures    Diet Order Diet: Regular (cruch meds that can be crushe in puree. float others one at a time,  up in wheel chair and in dining room for all meals)       Medical Decision Making and Plan:  Acute heart failure with preserved ejection fraction  PT and OT for mobility and ADLs. Per guidelines, 15 hours per week between PT, OT and/or  SLP.  Follow-up cardiology  BNP in the a.m.  Prior to admission: Lasix 60 mg IV twice daily, lisinopril 5 mg daily, Aldactone 25 mg daily on HOLD due to hypotension from aggressive diuresis s/p gentle fluids  Hospitalist consult    Dysphagia  SLP following      Acute hypoxemic respiratory failure secondary to multifocal pneumonia seen on CT  Procalcitonin, WBC within normal limits.  Being treated with Zithromax through 4/14 for possible atypical pneumonia  RT to consult  Encourage IS  Pep therapy  DuoNeb as needed  Continue Delsym  Continue Trelegy Ellipta  Pulm recommendation just prior to rehab admission is for short course of prednisone 40 mg daily for 5 days.  Initiating 4/13, completed 4/17.      Hypertension  Hypotension  Prior to admission: Lasix 60 mg IV twice daily, lisinopril 5 mg daily, Aldactone 25 mg daily on HOLD due to hypotension from aggressive diuresis s/p gentle fluids  4/17 VS with some aberrant elevations but mostly normal. Hospitalist following.     Leukocytosis  UA culture + Candida albicans. Start Fluconazole 4/17 4/16 Mild improvement in WBC to 11.4     Atrial fibrillation  Continue Xarelto 15 mg daily  Continue atenolol 50 mg every evening     TANIA  Secondary to aggressive diuresis  Monitor labs - renal function stable/improving  4/16 improving mildly      Hypothyroidism  Continue Synthroid 50 mcg each morning     Pain  Tylenol as needed     Skin  Patient at risk for skin breakdown due to debility in areas including sacrum, achilles, elbows and head in addition to other sites. Nursing to assess skin daily.      Poor appetite  Depression  Continue mirtazapine 15 mg nightly  Continue sertraline 50 mg daily     Bowel   Patient on Senna-docusate for constipation prophylaxis.      Bladder  TV/PVR/BS PRN  Continue vibegron 75 mg daily  UA positive for few bacteria and budding yeast. Not on meds. Culture PENDING 4/17     Fungal/yeast within skin folds  Continue nystatin powder through 4/15    RLE  Swelling  US Right LE        Upcoming Labs/imagin/18 - CBC and BMP     DVT PROPHYLAXIS: Xarelto 15 mg daily     HOSPITALIST FOLLOWING: Yes consulted on admission - d/w hospitalist      CODE STATUS: Full code     DISPO: Home to ILF versus custodial     ERLINDA: 24     ADDITIONAL MEDICAL NEEDS ON D/C (IV abx, O2, etc): TBD     MEDS SENT TO: TBD     DISCHARGE SPECIALIST FOLLOW UP: Cardiology     Patient to scheduled follow up with their PCP within 2 weeks from discharge from the [facility].      ____________________________________    Dr. Lisa Mercedes DO, MS  [facility] - Physical Medicine & Rehabilitation   ____________________________________

## 2024-04-16 NOTE — THERAPY
Speech Language Pathology  Daily Treatment     Patient Name: Donte Magaña  Age:  83 y.o., Sex:  female  Medical Record #: [id number]  Today's Date: 4/15/2024     Precautions  Precautions: Fall Risk, Pacemaker Precautions  Comments: Pacemaker 2 years old per pt report    Subjective    Pt pleasant and cooperative during ST session.      Objective       04/15/24 1531   Treatment Charges   Charges Yes   SLP Cognitive Skill Development First 15 Minutes 1   SLP Cognitive Skill Development Additional 15 Minutes 1   SLP Total Time Spent   SLP Individual Total Time Spent (Mins) 30   SCCAN (Scales of Cognitive and Communicative Ability for Neurorehabilitation)   Oral Expression - Raw Score 15   Oral Expression - Scale Performance Score 79   Orientation - Raw Score 9   Orientation - Scale Performance Score 75   Memory - Raw Score 8   Memory - Scale Performance Score 42   Speech Comprehension - Raw Score 10   Speech Comprehension - Scale Performance Score 77   Interdisciplinary Plan of Care Collaboration   IDT Collaboration with  Family / Caregiver   Patient Position at End of Therapy Seated;Tray Table within Reach;Phone within Reach   Collaboration Comments POC         Assessment    Cont'd administration of the SCCAN.     Strengths: Willingly participates in therapeutic activities, Pleasant and cooperative, Able to follow instructions, Effective communication skills  Barriers: Aspiration risk, Difficulty following instructions    Plan    Finish reading and writing portions of SCCAN, dysphagia management.     Passport items to be completed:  Express basic needs, understand food/liquid recommendations, consistently follow swallow precautions, manage finances, manage medications, arrive to therapy appointments on time, complete daily memory log entries, solve problems related to safety situations, review education related to hospitalization, complete caregiver training     Speech Therapy Problems (Active)       Problem: Memory  STGs       Dates: Start:  04/14/24         Goal: STG-Within one week, patient will complete the SCCAN with goals to follow.       Dates: Start:  04/14/24               Problem: Speech/Swallowing LTGs       Dates: Start:  04/14/24         Goal: LTG-By discharge, patient will safely swallow textures/consistencies determined by MBSS to be the least restrictive with no overt s/sx of aspiration, no decline in respiratory status.       Dates: Start:  04/14/24               Problem: Swallowing STGs       Dates: Start:  04/14/24         Goal: STG-Within one week, patient will participate in MBSS to inform dysphagia management.       Dates: Start:  04/14/24

## 2024-04-16 NOTE — PROGRESS NOTES
Hospital Medicine Daily Progress Note        Chief Complaint  Hypertension  Afib  Leukocytosis    Interval Problem Update  Discussed about her U/A was positive and will follow up with the urine cultures.    Review of Systems  Review of Systems   Constitutional:  Negative for chills and fever.   Respiratory:  Negative for shortness of breath.    Cardiovascular:  Negative for chest pain.   Gastrointestinal:  Negative for abdominal pain, diarrhea, nausea and vomiting.   Psychiatric/Behavioral:  The patient is not nervous/anxious.         Physical Exam  Temp:  [36.3 °C (97.4 °F)-37.2 °C (98.9 °F)] 36.7 °C (98 °F)  Pulse:  [69-95] 69  Resp:  [16-18] 18  BP: (138-150)/(61-88) 138/88  SpO2:  [88 %-97 %] 93 %    Physical Exam  Vitals and nursing note reviewed.   Constitutional:       Appearance: Normal appearance.   HENT:      Head: Atraumatic.   Eyes:      Conjunctiva/sclera: Conjunctivae normal.      Pupils: Pupils are equal, round, and reactive to light.   Cardiovascular:      Rate and Rhythm: Normal rate and regular rhythm.      Heart sounds: No murmur heard.  Pulmonary:      Effort: Pulmonary effort is normal.      Breath sounds: No stridor. No wheezing or rales.   Abdominal:      General: There is no distension.      Palpations: Abdomen is soft.      Tenderness: There is no abdominal tenderness.   Musculoskeletal:      Cervical back: Normal range of motion and neck supple.      Right lower leg: No edema.      Left lower leg: No edema.   Skin:     General: Skin is warm and dry.      Findings: No rash.   Neurological:      Mental Status: She is alert and oriented to person, place, and time.   Psychiatric:         Mood and Affect: Mood normal.         Behavior: Behavior normal.         Fluids    Intake/Output Summary (Last 24 hours) at 4/16/2024 1117  Last data filed at 4/16/2024 0900  Gross per 24 hour   Intake 960 ml   Output --   Net 960 ml       Laboratory  Recent Labs     04/14/24  0602 04/16/24  0651   WBC 12.1*  11.4*   RBC 4.13* 4.07*   HEMOGLOBIN 13.1 13.1   HEMATOCRIT 40.5 40.3   MCV 98.1* 99.0*   MCH 31.7 32.2   MCHC 32.3 32.5   RDW 46.5 48.1   PLATELETCT 342 374   MPV 9.9 9.7     Recent Labs     04/14/24  0602 04/16/24  0651   SODIUM 137 143   POTASSIUM 4.7 5.1   CHLORIDE 100 102   CO2 24 32   GLUCOSE 132* 128*   BUN 46* 38*   CREATININE 1.04 0.84   CALCIUM 9.5 9.8                   Assessment/Plan  Hypophosphatemia  Assessment & Plan  PO4: 2.1  Cont supplements  Cont to monitor    Acute respiratory failure (HCC)- (present on admission)  Assessment & Plan  Resolved (from [facility])  CT bilateral ground glass opacities, negative for PE  Echo: EF 60%, grade II diastolic dysfunction, mild MR/mod AI  BNP: 622 --> 1233 --> 1144 (4/14)  CXR: negative acute  Soft tissue neck X-ray: unremarkable  S/P Azithromycin  On Prednisone  On Trelegy, Singulair, and Delsym  Resp & O2 protocols as needed    Essential hypertension- (present on admission)  Assessment & Plan  BP ok but occ rises up a little  Cont Atenolol  Cont to monitor    Azotemia- (present on admission)  Assessment & Plan  Bun: 46 --> 38  ? 2nd to aggressive diuresis at [facility]  Encouraging fluid intake  Continue to hold Lasix, Aldactone, and Lisinopril  Cont to monitor    Mild Leukocytosis- (present on admission)  Assessment & Plan  WBC's: 12.1 (4/14) --> 11.4 (4/16)  Has been afebrile  Likely 2nd to steroids  PCT: 0.09  UA: showed 0-2 WBC, few bacteria, yeast present  CXR: negative acute  F/U UC  Cont to monitor    Multiple drug allergies- (present on admission)  Assessment & Plan  Extensive list noted    Persistent atrial fibrillation (HCC)- (present on admission)  Assessment & Plan  S/P PPM  On Atenolol for rate control  Anticoagulated on Xarelto    Mild episode of recurrent major depressive disorder (HCC)- (present on admission)  Assessment & Plan  Cont Zoloft    Hypothyroidism- (present on admission)  Assessment & Plan  TSH: 0.18  FT4: 1.53  On Synthroid -- dose decreased  recently by Dr. Crowell  Needs outpatient follow up

## 2024-04-16 NOTE — CARE PLAN
Problem: Memory STGs  Goal: STG-Within one week, patient will implement functional memory strategies with 80% accuracy provided mod cues.   Outcome: Not Met  Note: Patient needed max A to recall information.      Problem: Problem Solving STGs  Goal: STG-Within one week, patient will complete sustained/alternating/selective attention tasks with 80% accuracy provided mod cues.  Outcome: Not Met  Note: Patient needs mod to max A to complete problem solving and attention tasks.      Problem: Swallowing STGs  Goal: STG-Within one week, patient will participate in MBSS to inform dysphagia management.  Outcome: Met  Note: MBSS completed 4/15/24 (Monday). Overall, patient presented with mild (but functional) oral stage dysphagia. Recommend to discontinue  further dysphagia intervention and return to (7) regular textures and (0) thin liquids.     Problem: Memory STGs  Goal: STG-Within one week, patient will complete the SCCAN with goals to follow.  Outcome: Met  Note: Completed cognitive-linguistic assessment SCCAN 4/16/24 (Tuesday). Patient scored moderate overall, with mild impairments in oral expression, orientation, speech comprehension, reading comprehension, reading comprehension, and problem solving, and severe impairments in memory and attention.

## 2024-04-16 NOTE — WOUND TEAM
[facility]  Inpatient Services  Initial Wound and Skin Care Evaluation    Admission Date: 4/12/2024     Last order of IP CONSULT TO WOUND CARE was found on 4/15/2024 from Hospital Encounter on 4/12/2024     HPI, PMH, SH: Reviewed    Past Surgical History:   Procedure Laterality Date    ME COMBINED ANT/POST COLPORRHAPHY  1/14/2020    Procedure: COLPORRHAPHY, COMBINED ANTEROPOSTERIOR - PERINEOPLASTY;  Surgeon: Waqas Robin M.D.;  Location: SURGERY SAME DAY [facility];  Service: Gynecology    ME LAP,DIAGNOSTIC ABDOMEN  1/14/2020    Procedure: PELVISCOPY;  Surgeon: Waqas Robin M.D.;  Location: SURGERY SAME DAY [facility];  Service: Gynecology    ENTEROCELE REPAIR  1/14/2020    Procedure: REPAIR, ENTEROCELE;  Surgeon: Waqas Robin M.D.;  Location: SURGERY SAME DAY [facility];  Service: Gynecology    BLADDER SLING FEMALE  1/14/2020    Procedure: BLADDER SLING, FEMALE - TOT;  Surgeon: Waqas Robin M.D.;  Location: SURGERY SAME DAY [facility];  Service: Gynecology    VAGINAL SUSPENSION  1/14/2020    Procedure: COLPOPEXY - SACROSPINOUS VAULT SUSPENSION;  Surgeon: Waqas Robin M.D.;  Location: SURGERY SAME DAY [facility];  Service: Gynecology    SALPINGO OOPHORECTOMY Bilateral 1/14/2020    Procedure: SALPINGO-OOPHORECTOMY;  Surgeon: Waqas Robin M.D.;  Location: SURGERY SAME DAY [facility];  Service: Gynecology    IRRIGATION & DEBRIDEMENT ORTHO Right 2/14/2019    Procedure: IRRIGATION & DEBRIDEMENT ORTHO-HIP WOUND ;  Surgeon: Vladimir Lee M.D.;  Location: [facility];  Service: Orthopedics    HIP ARTH ANTERIOR TOTAL Right 1/17/2019    Procedure: HIP ARTHROPLASTY ANTERIOR TOTAL;  Surgeon: Juan C Mercedes M.D.;  Location: [facility];  Service: Orthopedics    PACEMAKER INSERTION  06/30/2018    Dual Chamber    KNEE ARTHROPLASTY TOTAL Right 6/23/2016    Procedure: KNEE ARTHROPLASTY TOTAL;  Surgeon: Heriberto Lozada M.D.;  Location: [facility]  [facility];  Service:     KNEE ARTHROPLASTY TOTAL Left 5/28/2015    Procedure: KNEE ARTHROPLASTY TOTAL;  Surgeon: Heriberto Lozada M.D.;  Location: SURGERY [facility];  Service:     CATARACT PHACO WITH IOL Right 5/5/2015    Procedure: IOL OD - STANDARD;  Surgeon: Dmitry Bejarano M.D.;  Location: SURGERY [facility];  Service:     CATARACT PHACO WITH IOL  4/21/2015    Performed by Dmitry Bejarano M.D. at SURGERY [facility]    RECOVERY  11/30/2010    Performed by SURGERY, [facility]-RECOVERY at SURGERY SAME DAY [facility]    COLONOSCOPY  2008    Normal    [facility]    ABDOMINAL HYSTERECTOMY TOTAL  April 15,1975    still has ovaries    OPEN REDUCTION      left ankle    OTHER      Removed pins from left ankle    OTHER CARDIAC SURGERY  12/2017 and 07/19/2018     Cardiac Ablation    TONSILLECTOMY AND ADENOIDECTOMY       Social History     Tobacco Use    Smoking status: Never    Smokeless tobacco: Never   Substance Use Topics    Alcohol use: No     No chief complaint on file.    Diagnosis: Acute respiratory failure (HCC) [J96.00]    Unit where seen by Wound Team: RH32/01     WOUND CONSULT RELATED TO:  Buttocks, groin and heels    WOUND TEAM PLAN OF CARE - Frequency of Follow-up:   Nursing to follow dressing orders written for wound care. Contact wound team if area fails to progress, deteriorates or with any questions/concerns if something comes up before next scheduled follow up (See below as to whether wound is following and frequency of wound follow up)   Weekly - Groin and Buttocks    WOUND HISTORY:   Wounds found POA       WOUND ASSESSMENT/LDA  Wound 04/07/24 Pressure Injury Coccyx;Buttocks POA DTI with surrounding MASD (Active)   Date First Assessed/Time First Assessed: 04/07/24 2200   Present on Original Admission: Yes  Hand Hygiene Completed: Yes  Primary Wound Type: Pressure Injury  Location: Coccyx;Buttocks  Wound Description (Comments): POA DTI with surrounding MASD      Assessments 4/15/2024   2:00 PM   Wound Image     Site Assessment Pink;Purple;Red;Excoriated;Crusted   Periwound Assessment Pink;Purple;Flaky;Non-blanchable erythema;Blanchable erythema   Margins Undefined edges   Closure Open to air   Drainage Amount None   Treatments Cleansed;Site care   Wound Cleansing Approved Wound Cleanser   Periwound Protectant Cavilon Advanced   Dressing Status Open to Air   Dressing Options Cavilon Advanced   Dressing Change/Treatment Frequency By Wound Team Only   NEXT Dressing Change/Treatment Date 04/22/24   NEXT Weekly Photo (Inpatient Only) 04/22/24   Wound Team Following Weekly   Pressure Injury Stage Deep Tissue   WOUND NURSE ONLY - Time Spent with Patient (mins) 45       Wound Groin Groin moisture fissure (Active)   No Date First Assessed or Time First Assessed found.   Present on Original Admission: Yes  Location: Groin  Wound Description (Comments): Groin moisture fissure      Assessments 4/15/2024  2:00 PM   Wound Image      Site Assessment Pink   Periwound Assessment Painful;Excoriated;Pink   Margins Defined edges   Closure Open to air   Drainage Amount Scant   Drainage Description Serous   Treatments Site care;Cleansed   Wound Cleansing Approved Wound Cleanser   Periwound Protectant Cavilon Advanced   Dressing Status Open to Air   NEXT Weekly Photo (Inpatient Only) 04/22/24   Wound Team Following Weekly   Shape linear   WOUND NURSE ONLY - Time Spent with Patient (mins) 45      Right Heel:      Left Heel:      Vascular:    MARY BETH:   No results found.    Lab Values:    Lab Results   Component Value Date/Time    WBC 12.1 (H) 04/14/2024 06:02 AM    WBC 9.3 07/29/2010 12:00 AM    RBC 4.13 (L) 04/14/2024 06:02 AM    RBC 4.57 07/29/2010 12:00 AM    HEMOGLOBIN 13.1 04/14/2024 06:02 AM    HEMATOCRIT 40.5 04/14/2024 06:02 AM    CREACTPROT 5.81 (H) 04/10/2024 01:55 PM    SEDRATEWES 20 04/10/2024 11:00 AM    HBA1C 5.3 02/13/2019 07:57 PM         Culture Results show:  No results found for this or any previous visit  (from the past 720 hour(s)).    Pain Level/Medicated:  Patient denies pain       INTERVENTIONS BY WOUND TEAM:  Chart and images reviewed. Discussed with bedside RN. All areas of concern (based on picture review, LDA review and discussion with bedside RN) have been thoroughly assessed. Documentation of areas based on significant findings. This RN in to assess patient. Performed standard wound care which includes appropriate positioning, dressing removal and non-selective debridement. Pictures and measurements obtained weekly if/when required.    Wound:  Buttocks  Preparation for Dressing removal: Open to air  Cleansed/Non-selectively Debrided with:  Moist warm washcloth  Edie wound: Cleansed with N/A, Prepped with Cavilon Advanced  Primary Dressing:  ROSANGELA     Wound:  Groin  Preparation for Dressing removal: Open to air  Cleansed/Non-selectively Debrided with:  Moist warm washcloth  Edie wound: Cleansed with N/A, Prepped with Cavilon Advanced  Primary Dressing:  ROSANGELA      Advanced Wound Care Discharge Planning  Number of Clinicians necessary to complete wound care: 2  Is patient requiring IV pain medications for dressing changes:  No   Length of time for dressing change 30 min. (This does not include chart review, pre-medication time, set up, clean up or time spent charting.)    Interdisciplinary consultation: Patient, Bedside RN (Radha),  Ciara, RN .  Pressure injury and staging reviewed with  DANNY Urbina .    EVALUATION / RATIONALE FOR TREATMENT:     Date:  04/15/24  Wound Status:  Initial evaluation    Buttocks  - POA DTI with surrounding MASD. Cavillon Advanced used to protect skin from incontinence and moisture.    Bilateral Groin - Moisture fissures bilateral. Cavillon Advanced used to protect skin from moisture.     Heels - blanchable erythema, boggy. Float heels with pillows as patient already on low airloss mattress.     Education provided for patient to turn/change positions frequently. Patient  verbalized understanding.          Goals: Steady decrease in wound area and depth weekly.    NURSING PLAN OF CARE ORDERS:  Dressing changes: See Dressing Care orders    NUTRITION RECOMMENDATIONS   Wound Team Recommendations:  N/A    DIET ORDERS (From admission to next 24h)       Start     Ordered    04/15/24 1524  Diet Order Diet: Regular (cruch meds that can be crushe in puree. float others one at a time,  up in wheel chair and in dining room for all meals)  ALL MEALS        Question:  Diet:  Answer:  Regular  Comment:  cruch meds that can be crushe in puree. float others one at a time,  up in wheel chair and in dining room for all meals    04/15/24 1524    04/15/24 0743  Dietary Comment  ONCE        Comments: Modified barium swallow tray for 10:00 on 4/15/24    04/15/24 0742    04/12/24 1545  Supplements  ALL MEALS        Question Answer Comment   Which Supplement Ensure    Ensure: Ensure Plus Carton Strawberry and chocolate flavor only per pt request.       04/12/24 1544                    PREVENTATIVE INTERVENTIONS:    Q shift Ashutosh - performed per nursing policy  Q shift pressure point assessments - performed per nursing policy    Surface/Positioning  Low Airloss - Currently in Place  TAPs Turning system - Currently in Place    Offloading/Redistribution  Float Heels off Bed with Pillows - Ordered           Mobilization      Working with Therapies      Anticipated discharge plans:  TBD        Vac Discharge Needs:  Vac Discharge plan is purely a recommendation from wound team and not a requirement for discharge unless otherwise stated by physician.  Not Applicable Pt not on a wound vac

## 2024-04-16 NOTE — PROGRESS NOTES
NURSING DAILY NOTE    Name: Donte Magaña  Date of Admission: 4/12/2024  Admitting Diagnosis: Acute diastolic heart failure (HCC)  Attending Physician: Lisa Mercedes D.o.  Allergies: Amiodarone, Bactrim, Cipro xr, Metoprolol, Morphine, Phytoplex z-guard [petrolatum-zinc oxide], Pseudoephedrine, Qvar [beclomethasone dipropionate], Vibramycin, Atorvastatin calcium-polysorbate 80, Augmentin, Diltiazem, Flecainide, Keflex, Mucinex, Tramadol, Atorvastatin, and Tape    Safety  Patient Assist  omin  Patient Precautions  oFall Risk, Pacemaker Precautions  Precaution Comments  oPacemaker 2 years old per pt report  Bed Transfer Status  oContact Guard Assist  Toilet Transfer Status  oContact Guard Assist  Assistive Devices  oRails, Wheelchair  Oxygen  oNasal Cannula  Diet/Therapeutic Dining  o  Current Diet Order   Procedures    Diet Order Diet: Regular (cruch meds that can be crushe in puree. float others one at a time,  up in wheel chair and in dining room for all meals)     Pill Administration  ocrushed and floated  Agitated Behavioral Scale  o   ABS Level of Severity  o     Fall Risk  Has the patient had a fall this admission?  Corey  Renetta Chauhan Fall Risk Scoring  o22, HIGH RISK  Fall Risk Safety Measures  renetta alarm and chair alarm    Vitals  Temperature: 36.3 °C (97.4 °F)  Temp src: Temporal  Pulse: 85  Respiration: 17  Blood Pressure : (!) 141/82  Blood Pressure MAP (Calculated): 102 MM HG  BP Location: Left, Upper Arm  Patient BP Position: Supine    Oxygen  Pulse Oximetry: 94 %  O2 (LPM): 1  O2 Delivery Device: Nasal Cannula    Bowel and Bladder  Last Bowel Movement  o04/16/24  Stool Type  oType 6: Fluffy pieces with ragged edges, a mushy stool  Bowel Device  oBathroom, Diaper  Continent  oBladder:    oBowel:    Bladder Function  oUrine Void (mL):  (Moderate)  Number of Times Voided: 1  Urine Color: Yellow  Urine Clarity: Clear  Urine Odor: Malodorous  Number of Times Incontinent of Urine: 0  Straight  Catheter: 400 ml  Genitourinary Assessment  oBladder Assessment (WDL):  WDL Except  Pruett Catheter: Not Applicable  Urine Color: Yellow  Urine Clarity: Clear  Urine Odor: Malodorous  Number of Bladder Accidents: 1  Total Number of Bladder of Accidents in Last 7 Days: 1  Number of Times Incontinent of Urine: 0  Bladder Device: Bathroom, Diaper  Bladder Scan: Post Void  $ Bladder Scan Results (mL): 26    Skin  Ashutosh Score  o 18  Sensory Interventions  o Bed Types: Low Airloss  Skin Preventative Measures: Pillows in Use for Support / Positioning, Pillows in Use to Float Heels, Wedges in Use for Positioning  Moisture Interventions  oMoisturizers/Barriers: Barrier Wipes      Pain  Pain Rating Scale  o0 - No Pain  Pain Location  o   Pain Location Orientation  o   Pain Interventions  oDeclines    ADLs    Bathing  o   Linen Change  o   Personal Hygiene  oMoist Edie Wipes  Chlorhexidine Bath  o   Oral Care  oBrushed Teeth (Self)  Teeth/Dentures  o   Shave  o   Nutrition Percentage Eaten  o*  * Meal *  *, Dinner, Between % Consumed  Environmental Precautions  oTreaded Slipper Socks on Patient, Bed in Low Position  Patient Turns/Positioning  oPatient Turns Self from Side to Side  Patient Turns Assistance/Tolerance  o   Bed Positions  Zach Controls On, Bed Locked  Head of Bed Elevated  oLess than 30 degrees      Psychosocial/Neurologic Assessment  Psychosocial Assessment  oPsychosocial (WDL):  Within Defined Limits  Patient Behaviors: Fatigue, Forgetful  Neurologic Assessment  oNeuro (WDL): Exceptions to WDL  Level of Consciousness: Alert  Orientation Level: Oriented to person, Oriented to place, Oriented to time, Disoriented to situation  Cognition: Follows commands, Confused in conversation  Speech: Delayed responses  Pupil Assesment: No  R Pupil Size (mm): 3  R Pupil Shape / Description: Round  R Pupil Reaction: Brisk  L Pupil Size (mm): 3  L Pupil Shape / Description: Round  L Pupil Reaction: Brisk  oEENT (WDL):  WDL  Except    Cardio/Pulmonary Assessment  Edema  o   Respiratory Breath Sounds  oRUL Breath Sounds: Clear  RML Breath Sounds: Crackles  RLL Breath Sounds: Crackles  DO Breath Sounds: Clear  LLL Breath Sounds: Crackles  Cardiac Assessment  oCardiac (WDL):  WDL Except

## 2024-04-16 NOTE — THERAPY
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      Objective     04/16/24 1401   Treatment Charges   SLP Cognitive Skill Development First 15 Minutes 1   SLP Cognitive Skill Development Additional 15 Minutes 3   SLP Total Time Spent   SLP Individual Total Time Spent (Mins) 60   Precautions   Precautions Fall Risk   Written Language Expression   Dominant Hand Right   SCCAN (Scales of Cognitive and Communicative Ability for Neurorehabilitation)   Oral Expression - Raw Score 15   Oral Expression - Scale Performance Score 79   Orientation - Raw Score 9   Orientation - Scale Performance Score 75   Memory - Raw Score 8   Memory - Scale Performance Score 42   Speech Comprehension - Raw Score 10   Speech Comprehension - Scale Performance Score 77   Reading Comprehension - Raw Score 9   Reading Comprehension - Scale Performance Score 75   Writing - Raw Score 7   Writing - Scale Performance Score 100   Attention - Raw Score 7   Attention - Scale Performance Score 44   Problem Solving - Raw Score 17   Problem Solving - Scale Performance Score 74   SCCAN Total Raw Score 67   SCCAN Degree of Severity Moderate Impairment   Functional Level of Assist   Eating Supervision   Eating Description Increased time;Modified diet;Set-up of equipment or meal/tube feeding;Supervision for safety;Verbal cueing   Comprehension Minimal Assist   Comprehension Description Glasses;Verbal cues   Expression Supervision   Expression Description Verbal cueing   Social Interaction Supervision   Social Interaction Description Increased time;Verbal cues;Schedule   Problem Solving Moderate Assist   Problem Solving Description Bed/chair alarm;Increased time;Seat belt;Supervision;Therapy schedule   Memory  Maximal Assist   Memory Description Bed/chair alarm;Increased time;Seat belt;Supervision;Therapy schedule;Verbal cueing   Interdisciplinary Plan of Care Collaboration   Patient Position at End of Therapy In Bed;Bed Alarm On;Self Releasing Lap Belt Applied;Call Light within Reach;Tray Table within Reach;Phone within Reach   Speech Language Pathologist Assigned   Assigned SLP / Treatment Time / Comments LM/MS 60 Cog, Swallow       Assessment    Completed administration of cognitive-linguistic assessment (SCCAN). Patient achieved total raw score of 67, which is indicative of a moderate cognitive-communication impairment. Overall, patient demonstrated mild impairments in oral expression, orientation, speech comprehension, reading comprehension, reading comprehension, and problem solving, and severe impairments in memory and attention. Results of SCCAN were reviewed with patient. Patient agreeable to target recall and attention therapy.    Introduced [facility] memory strategies and daily log. Patient recalled and recorded what she had for breakfast and lunch, and what occurred in previous PT/OT sessions with mod A. Patient immediately recalled 2/2 clinicians names with 100% accuracy. After 15 minute delay, patient recalled 1/2. Patient tasked to memorize 5 unrelated words, in which she immediately recalled 5/5. After 15 minute delay, patient could not recall words independently, needed mod A to recall. Targeted sequence for sit-to-stand to toilet. Patient required mod cues to recall and implement safety strategies (e.g., remembering to lock brakes).     Strengths: Willingly participates in therapeutic activities, Pleasant and cooperative, Able to follow instructions, Effective communication skills  Barriers: Aspiration risk, Difficulty following instructions    Plan    Target recall, attention, and problem solving.    Passport items to be completed:  Express basic needs, understand food/liquid recommendations, consistently  follow swallow precautions, manage finances, manage medications, arrive to therapy appointments on time, complete daily memory log entries, solve problems related to safety situations, review education related to hospitalization, complete caregiver training     Speech Therapy Problems (Active)       Problem: Memory STGs       Dates: Start:  04/14/24         Goal: STG-Within one week, patient will complete the SCCAN with goals to follow.       Dates: Start:  04/14/24            Goal: STG-Within one week, patient will implement functional memory strategies with 80% accuracy provided mod cues.        Dates: Start:  04/16/24               Problem: Problem Solving STGs       Dates: Start:  04/16/24         Goal: STG-Within one week, patient will complete sustained/alternating/selective attention tasks with 80% accuracy provided mod cues.       Dates: Start:  04/16/24               Problem: Speech/Swallowing LTGs       Dates: Start:  04/14/24         Goal: LTG-By discharge, patient will safely swallow textures/consistencies determined by MBSS to be the least restrictive with no overt s/sx of aspiration, no decline in respiratory status.       Dates: Start:  04/14/24            Goal: LTG-By discharge, patient will complete functional problem solving and recall information with 80% accuracy provided min cues.        Dates: Start:  04/16/24               Problem: Swallowing STGs       Dates: Start:  04/14/24         Goal: STG-Within one week, patient will participate in MBSS to inform dysphagia management.       Dates: Start:  04/14/24

## 2024-04-16 NOTE — CARE PLAN
The patient is Stable - Low risk of patient condition declining or worsening    Shift Goals  Clinical Goals: safety  Patient Goals: sleep well    Progress made toward(s) clinical / shift goals:    Problem: Fall Risk - Rehab  Goal: Patient will remain free from falls  Outcome: Progressing. Patient bed in lowest locked position with bed alarm on and call light within reach. Patient calls appropriately with call light for needs and has not attempted to self transfer over shift.      Problem: Risk for Aspiration  Goal: Patient's risk for aspiration will be absent or decrease  Outcome: Progressing. Patient sat upright in bed and took medications crushed with applesauce one spoonful at a time with assistance. Patient shows no signs of aspiration or choking over shift.

## 2024-04-16 NOTE — THERAPY
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  C/o feeling cold throughout session, provided hot coffee and she feels better.     Objective       04/16/24 0701   PT Charge Group   PT Therapeutic Exercise (Units) 2   PT Therapeutic Activities (Units) 2   Supervising Physical Therapist Wayne Stephens   PT Total Time Spent   PT Individual Total Time Spent (Mins) 60   Vitals   O2 (LPM) 2   O2 Delivery Device Nasal Cannula   Cognition    Level of Consciousness Alert   Attention Impaired   Transfer Functional Level of Assist   Bed, Chair, Wheelchair Transfer Contact Guard Assist   Bed Chair Wheelchair Transfer Description Adaptive equipment;Increased time;Initial preparation for task;Supervision for safety  (SPT w/ FWW)   Toilet Transfers Contact Guard Assist   Toilet Transfer Description Grab bar;Increased time;Initial preparation for task;Set-up of equipment;Supervision for safety  (w/c <> toilet via GB)   Sitting Lower Body Exercises   Ankle Pumps 1 set of 15;Bilateral   Hip Abduction 1 set of 15;Bilateral   Hip Adduction 1 set of 15;Bilateral   Long Arc Quad 1 set of 15;Bilateral   Marching 1 set of 15   Hamstring Curl 1 set of 15;Bilateral   Other Exercises BLE motomed at L2 for 8 mins, active throughout for 0.7 miles   Bed Mobility    Sit to Stand Contact Guard Assist   Scooting Standby Assist   Interdisciplinary Plan of Care Collaboration   Patient Position at End of Therapy Seated  (in [place])         Assessment    Pt tolerated session well, however she was limited by impaired attention and fatigue during this AM, required frequent cues to keep eyes open and get back to tasks. Overall CGA during mobility and transfer.      Strengths: Able  to follow instructions, Motivated for self care and independence, Pleasant and cooperative, Willingly participates in therapeutic activities (MCC- elevator access)  Barriers: Decreased endurance, Fatigue, Generalized weakness, Impaired activity tolerance, Impaired balance, Limited mobility (PLOF- primary scooter user)    Plan    Ambulation w/ FWW > trial SPC when able  Endurance  BLE strengthening     DME  PT DME Recommendations  Additional Equipment:  (Non aticipated; pt has FWW, SPC, scooter)     Passport items to be completed:  Get in/out of bed safely, in/out of a vehicle, safely use mobility device, walk or wheel around home/community, navigate up and down stairs, show how to get up/down from the ground, ensure home is accessible, demonstrate HEP, complete caregiver training    Physical Therapy Problems (Active)       Problem: Mobility       Dates: Start:  04/13/24         Goal: STG-Within one week, patient will propel wheelchair community >100 ft indoor SPV       Dates: Start:  04/13/24            Goal: STG-Within one week, patient will ambulate household distance 20 ft x 2 indoors with FWW       Dates: Start:  04/13/24               Problem: Mobility Transfers       Dates: Start:  04/13/24         Goal: STG-Within one week, patient will transfer bed to chair SPT FWW CGA       Dates: Start:  04/13/24               Problem: PT-Long Term Goals       Dates: Start:  04/13/24         Goal: LTG-By discharge, patient will propel wheelchair/ scooter mod I >150 ft over various surfaces       Dates: Start:  04/13/24            Goal: LTG-By discharge, patient will ambulate with FWW 50 ft x 2 indoors SPV-mod I        Dates: Start:  04/13/24            Goal: LTG-By discharge, patient will transfer one surface to another mod I with FWW       Dates: Start:  04/13/24

## 2024-04-16 NOTE — THERAPY
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  She was agreeable to OT.     Objective       04/16/24 1031   OT Charge Group   OT Self Care / ADL (Units) 1   OT Therapy Activity (Units) 2   OT Therapeutic Exercise (Units) 1   OT Total Time Spent   OT Individual Total Time Spent (Mins) 60   Precautions   Precautions Fall Risk   Functional Level of Assist   Eating Supervision   Grooming Modified Independent;Seated   Toileting Standby Assist   Toileting Description Grab bar;Set-up of equipment;Supervision for safety   Bed, Chair, Wheelchair Transfer Standby Assist   Bed Chair Wheelchair Transfer Description Adaptive equipment;Set-up of equipment;Supervision for safety  (sba SPT with FWW bed to w/c)   Toilet Transfers Standby Assist   Toilet Transfer Description Grab bar;Set-up of equipment;Supervision for safety   Sitting Upper Body Exercises   Upper Extremity Bike Minutes / Rest Breaks (See Comments)  (motomed cycle gear 3 x 10 minutes for endurance building)   Sitting Lower Body Exercises   Sit to Stand 2 sets of 10  (from w/c up to FWW for endurance training and improving function)   Bed Mobility    Supine to Sit Standby Assist   Scooting Standby Assist         Assessment    Patient demonstrated good motivation to participate during therapy and completed all that was asked of her.    Strengths: Able to follow instructions, Independent prior level of function, Motivated for self care and independence, Pleasant and cooperative, Willingly participates in therapeutic activities  Barriers: Decreased endurance, Fatigue, Generalized weakness, Hypotension, Impaired activity tolerance, Impaired balance, Impaired carryover of learning, Impaired insight/denial of  deficits, Impaired functional cognition, Limited mobility    Plan    ADLs, transfers with FWW, strength/endurance building, standing tolerance/balance    DME  OT DME Recommendations  Additional Equipment:  (Non aticipated; pt has FWW, SPC, scooter)        Occupational Therapy Goals (Active)       Problem: Bathing       Dates: Start:  04/13/24         Goal: STG-Within one week, patient will bathe at SBA level using DME/AE PRN.       Dates: Start:  04/13/24               Problem: Dressing       Dates: Start:  04/13/24         Goal: STG-Within one week, patient will dress LB at CGA level using DME/AE PRN.       Dates: Start:  04/13/24               Problem: Functional Transfers       Dates: Start:  04/13/24         Goal: STG-Within one week, patient will transfer to toilet at SBA level using DME/AE PRN.       Dates: Start:  04/13/24            Goal: STG-Within one week, patient will transfer to step in shower at SBA level using DME/AE PRN.       Dates: Start:  04/13/24               Problem: OT Long Term Goals       Dates: Start:  04/13/24         Goal: LTG-By discharge, patient will complete basic self care tasks at SPV-Mod I level using DME/AE PRN.       Dates: Start:  04/13/24            Goal: LTG-By discharge, patient will perform bathroom transfers at SPV-Mod I level using DME/AE PRN.       Dates: Start:  04/13/24               Problem: Toileting       Dates: Start:  04/13/24         Goal: STG-Within one week, patient will complete toileting tasks at SBA level using DME/AE PRN.        Dates: Start:  04/13/24

## 2024-04-17 ENCOUNTER — APPOINTMENT (OUTPATIENT)
Dept: SPEECH THERAPY | Facility: REHABILITATION | Age: 84
DRG: 291 | End: 2024-04-17
Attending: GENERAL PRACTICE
Payer: MEDICARE

## 2024-04-17 ENCOUNTER — APPOINTMENT (OUTPATIENT)
Dept: OCCUPATIONAL THERAPY | Facility: REHABILITATION | Age: 84
DRG: 291 | End: 2024-04-17
Attending: PHYSICAL MEDICINE & REHABILITATION
Payer: MEDICARE

## 2024-04-17 ENCOUNTER — APPOINTMENT (OUTPATIENT)
Dept: PHYSICAL THERAPY | Facility: REHABILITATION | Age: 84
DRG: 291 | End: 2024-04-17
Attending: PHYSICAL MEDICINE & REHABILITATION
Payer: MEDICARE

## 2024-04-17 ENCOUNTER — ANCILLARY PROCEDURE (OUTPATIENT)
Dept: CARDIOLOGY | Facility: REHABILITATION | Age: 84
DRG: 291 | End: 2024-04-17
Attending: PHYSICAL MEDICINE & REHABILITATION
Payer: MEDICARE

## 2024-04-17 PROCEDURE — 97130 THER IVNTJ EA ADDL 15 MIN: CPT

## 2024-04-17 PROCEDURE — 93971 EXTREMITY STUDY: CPT | Mod: RT

## 2024-04-17 PROCEDURE — 97129 THER IVNTJ 1ST 15 MIN: CPT

## 2024-04-17 PROCEDURE — 94640 AIRWAY INHALATION TREATMENT: CPT

## 2024-04-17 PROCEDURE — A9270 NON-COVERED ITEM OR SERVICE: HCPCS | Performed by: PHYSICAL MEDICINE & REHABILITATION

## 2024-04-17 PROCEDURE — 770010 HCHG ROOM/CARE - REHAB SEMI PRIVAT*

## 2024-04-17 PROCEDURE — 94669 MECHANICAL CHEST WALL OSCILL: CPT

## 2024-04-17 PROCEDURE — A9270 NON-COVERED ITEM OR SERVICE: HCPCS | Performed by: HOSPITALIST

## 2024-04-17 PROCEDURE — 700102 HCHG RX REV CODE 250 W/ 637 OVERRIDE(OP): Performed by: HOSPITALIST

## 2024-04-17 PROCEDURE — 97535 SELF CARE MNGMENT TRAINING: CPT

## 2024-04-17 PROCEDURE — 97530 THERAPEUTIC ACTIVITIES: CPT | Mod: CQ

## 2024-04-17 PROCEDURE — 700111 HCHG RX REV CODE 636 W/ 250 OVERRIDE (IP): Performed by: PHYSICAL MEDICINE & REHABILITATION

## 2024-04-17 PROCEDURE — 93971 EXTREMITY STUDY: CPT | Mod: 26,RT | Performed by: INTERNAL MEDICINE

## 2024-04-17 PROCEDURE — 700102 HCHG RX REV CODE 250 W/ 637 OVERRIDE(OP): Performed by: PHYSICAL MEDICINE & REHABILITATION

## 2024-04-17 PROCEDURE — 700102 HCHG RX REV CODE 250 W/ 637 OVERRIDE(OP): Performed by: GENERAL PRACTICE

## 2024-04-17 PROCEDURE — A9270 NON-COVERED ITEM OR SERVICE: HCPCS | Performed by: GENERAL PRACTICE

## 2024-04-17 PROCEDURE — 99232 SBSQ HOSP IP/OBS MODERATE 35: CPT | Performed by: HOSPITALIST

## 2024-04-17 PROCEDURE — 94760 N-INVAS EAR/PLS OXIMETRY 1: CPT

## 2024-04-17 PROCEDURE — 97116 GAIT TRAINING THERAPY: CPT | Mod: CQ

## 2024-04-17 RX ORDER — FLUCONAZOLE 100 MG/1
200 TABLET ORAL ONCE
Status: COMPLETED | OUTPATIENT
Start: 2024-04-17 | End: 2024-04-17

## 2024-04-17 RX ORDER — FLUCONAZOLE 100 MG/1
100 TABLET ORAL DAILY
Status: COMPLETED | OUTPATIENT
Start: 2024-04-18 | End: 2024-04-21

## 2024-04-17 RX ORDER — LANOLIN ALCOHOL/MO/W.PET/CERES
400 CREAM (GRAM) TOPICAL
Qty: 90 TABLET | Refills: 0 | Status: SHIPPED | OUTPATIENT
Start: 2024-04-17 | End: 2024-04-22

## 2024-04-17 RX ADMIN — DEXTROMETHORPHAN POLISTIREX 60 MG: 30 SUSPENSION ORAL at 20:51

## 2024-04-17 RX ADMIN — ATENOLOL 50 MG: 25 TABLET ORAL at 20:52

## 2024-04-17 RX ADMIN — SENNOSIDES AND DOCUSATE SODIUM 1 TABLET: 50; 8.6 TABLET ORAL at 20:52

## 2024-04-17 RX ADMIN — FLUTICASONE FUROATE, UMECLIDINIUM BROMIDE AND VILANTEROL TRIFENATATE 1 PUFF: 100; 62.5; 25 POWDER RESPIRATORY (INHALATION) at 10:07

## 2024-04-17 RX ADMIN — LEVOTHYROXINE SODIUM 37.5 MCG: 0.07 TABLET ORAL at 05:17

## 2024-04-17 RX ADMIN — RIVAROXABAN 15 MG: 15 TABLET, FILM COATED ORAL at 19:27

## 2024-04-17 RX ADMIN — DEXTROMETHORPHAN POLISTIREX 60 MG: 30 SUSPENSION ORAL at 09:01

## 2024-04-17 RX ADMIN — FLUCONAZOLE 200 MG: 100 TABLET ORAL at 13:44

## 2024-04-17 RX ADMIN — SENNOSIDES AND DOCUSATE SODIUM 1 TABLET: 50; 8.6 TABLET ORAL at 09:00

## 2024-04-17 RX ADMIN — SERTRALINE HYDROCHLORIDE 50 MG: 50 TABLET ORAL at 09:00

## 2024-04-17 RX ADMIN — PREDNISONE 40 MG: 20 TABLET ORAL at 08:59

## 2024-04-17 RX ADMIN — MIRTAZAPINE 15 MG: 15 TABLET, ORALLY DISINTEGRATING ORAL at 20:52

## 2024-04-17 RX ADMIN — MONTELUKAST 10 MG: 10 TABLET, FILM COATED ORAL at 20:52

## 2024-04-17 RX ADMIN — VIBEGRON 75 MG: 75 TABLET, FILM COATED ORAL at 09:00

## 2024-04-17 RX ADMIN — POLYETHYLENE GLYCOL 3350 1 PACKET: 17 POWDER, FOR SOLUTION ORAL at 09:00

## 2024-04-17 ASSESSMENT — GAIT ASSESSMENTS
DISTANCE (FEET): 120
ASSISTIVE DEVICE: FRONT WHEEL WALKER
GAIT LEVEL OF ASSIST: CONTACT GUARD ASSIST
DEVIATION: DECREASED HEEL STRIKE;DECREASED TOE OFF

## 2024-04-17 ASSESSMENT — ACTIVITIES OF DAILY LIVING (ADL)
TUB_SHOWER_TRANSFER_DESCRIPTION: GRAB BAR;SHOWER BENCH;SET-UP OF EQUIPMENT;SUPERVISION FOR SAFETY
BED_CHAIR_WHEELCHAIR_TRANSFER_DESCRIPTION: ADAPTIVE EQUIPMENT;INITIAL PREPARATION FOR TASK;SET-UP OF EQUIPMENT;SUPERVISION FOR SAFETY;VERBAL CUEING
TOILET_TRANSFER_DESCRIPTION: GRAB BAR;INITIAL PREPARATION FOR TASK;SET-UP OF EQUIPMENT;SUPERVISION FOR SAFETY;VERBAL CUEING

## 2024-04-17 ASSESSMENT — ENCOUNTER SYMPTOMS
DIZZINESS: 0
NAUSEA: 0
FEVER: 0
VOMITING: 0
PALPITATIONS: 0
HEADACHES: 0
BLURRED VISION: 0
HALLUCINATIONS: 0
SHORTNESS OF BREATH: 0

## 2024-04-17 ASSESSMENT — PAIN DESCRIPTION - PAIN TYPE: TYPE: ACUTE PAIN

## 2024-04-17 NOTE — CARE PLAN
Problem: Bathing  Goal: STG-Within one week, patient will bathe at SBA level using DME/AE PRN.  Outcome: Met  Note: Setup and supervised     Problem: Dressing  Goal: STG-Within one week, patient will dress LB at CGA level using DME/AE PRN.  Outcome: Met  Note: Setup and SBA     Problem: Toileting  Goal: STG-Within one week, patient will complete toileting tasks at SBA level using DME/AE PRN.   Outcome: Met  Note: SBA/supervised     Problem: Functional Transfers  Goal: STG-Within one week, patient will transfer to toilet at SBA level using DME/AE PRN.  Outcome: Met  Note: SBA/supervised  Goal: STG-Within one week, patient will transfer to step in shower at SBA level using DME/AE PRN.  Outcome: Met  Note: SBA/supervised

## 2024-04-17 NOTE — TELEPHONE ENCOUNTER
Received request via: Patient    Was the patient seen in the last year in this department? Yes    Does the patient have an active prescription (recently filled or refills available) for medication(s) requested? No    Pharmacy Name: [facility]    Does the patient have snf Plus and need 100 day supply (blood pressure, diabetes and cholesterol meds only)? Patient does not have SCP

## 2024-04-17 NOTE — CARE PLAN
Problem: Mobility  Goal: STG-Within one week, patient will propel wheelchair community >100 ft indoor SPV  Outcome: Met  Goal: STG-Within one week, patient will ambulate household distance 20 ft x 2 indoors with FWW  Outcome: Met     Problem: Mobility Transfers  Goal: STG-Within one week, patient will transfer bed to chair SPT FWW CGA  Outcome: Met

## 2024-04-17 NOTE — CARE PLAN
The patient is Stable - Low risk of patient condition declining or worsening    Shift Goals  Clinical Goals: safety  Patient Goals: sleep well    Progress made toward(s) clinical / shift goals:    Problem: Fall Risk - Rehab  Goal: Patient will remain free from falls  Outcome: Progressing. Patient bed in lowest locked position with bed alarm on and call light within reach. Patient calls appropriately with call light for needs and has not attempted to self transfer over shift.      Problem: Bladder / Voiding  Goal: Patient will establish and maintain regular urinary output  Outcome: Progressing. Patient has remained continent of bladder over shift. Patient calls appropriately with call light to use bathroom, and performs own hygiene with sba from staff.

## 2024-04-17 NOTE — THERAPY
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 RN present in beginning of session to administer medications to patient. Medications crushed mixed in puree noted to be easier for patient.      Objective       04/17/24 0901   Treatment Charges   SLP Cognitive Skill Development First 15 Minutes 1   SLP Cognitive Skill Development Additional 15 Minutes 3   SLP Total Time Spent   SLP Individual Total Time Spent (Mins) 60   Precautions   Precautions Fall Risk   Functional Level of Assist   Eating Supervision   Eating Description Increased time;Modified diet;Set-up of equipment or meal/tube feeding;Supervision for safety;Verbal cueing   Comprehension Minimal Assist   Comprehension Description Glasses;Verbal cues   Expression Supervision   Expression Description Verbal cueing   Social Interaction Supervision   Social Interaction Description Increased time;Verbal cues;Schedule   Problem Solving Moderate Assist   Problem Solving Description Bed/chair alarm;Increased time;Seat belt;Supervision;Therapy schedule   Memory Maximal Assist   Memory Description Bed/chair alarm;Increased time;Seat belt;Supervision;Therapy schedule;Verbal cueing   Interdisciplinary Plan of Care Collaboration   IDT Collaboration with  Nursing   Patient Position at End of Therapy Seated;Chair Alarm On;Call Light within Reach;Self Releasing Lap Belt Applied;Tray Table within Reach;Phone within Reach   Collaboration Comments RN Jamal present to administer medications to patient in beginning of [facility]   Speech Language Pathologist Assigned   Assigned SLP / Treatment Time / Comments LM/MS 60 Cog         Assessment    Completed alternating attention task (matching each number to the letter to find message)  "with min-mod A (patient occasionally reported to \"get lost\" and asked what to do). Completed 4-step sequencing with mod A. Completed functional memory tasks of richy with 67% accuracy (4/6) and doctors appointments with 43% accuracy (3/7). Reviewed clinician's names, then after 5-minute delay, patient able to recall 1/2 of clinician's names. Reviewed 5 unrelated words, then after 5 -minute delay, patient required mod A to recall 3/5 words. Completed catching medication errors task with mod-max A (patient demonstrated difficulty understanding where errors were located and how to fix them).     Strengths: Willingly participates in therapeutic activities, Pleasant and cooperative, Able to follow instructions, Effective communication skills  Barriers: Aspiration risk, Difficulty following instructions    Plan    Target recall, attention, and problem solving.  Reduce ST to 30-minute sessions.     Passport items to be completed:  Express basic needs, understand food/liquid recommendations, consistently follow swallow precautions, manage finances, manage medications, arrive to therapy appointments on time, complete daily memory log entries, solve problems related to safety situations, review education related to hospitalization, complete caregiver training     Speech Therapy Problems (Active)       Problem: Memory STGs       Dates: Start:  04/14/24         Goal: STG-Within one week, patient will implement functional memory strategies with 80% accuracy provided mod cues.        Dates: Start:  04/16/24         Goal Note filed on 04/16/24 1550 by Janessa Alvarado Student       Patient needed max A to recall information.                  Problem: Problem Solving STGs       Dates: Start:  04/16/24         Goal: STG-Within one week, patient will complete sustained/alternating/selective attention tasks with 80% accuracy provided mod cues.       Dates: Start:  04/16/24         Goal Note filed on 04/16/24 1550 by Janessa Alvarado, Student  "      Patient needs mod to max A to complete problem solving and attention tasks.                  Problem: Speech/Swallowing LTGs       Dates: Start:  04/14/24         Goal: LTG-By discharge, patient will safely swallow textures/consistencies determined by MBSS to be the least restrictive with no overt s/sx of aspiration, no decline in respiratory status.       Dates: Start:  04/14/24            Goal: LTG-By discharge, patient will complete functional problem solving and recall information with 80% accuracy provided min cues.        Dates: Start:  04/16/24               Problem: Swallowing STGs       Dates: Start:  04/14/24

## 2024-04-17 NOTE — PROGRESS NOTES
"  Physical Medicine & Rehabilitation Progress Note    Encounter Date: 4/17/2024    Chief Complaint: Doing well    Interval Events (Subjective):  VS: level of supp O2 reducing down to 1 more consistently. Intermittent elevated SBP 150s  Multiple BM 4/16  Voiding    Patient seen and examined in the cafe. She has no complaints.     ROS: 14 point ROS negative unless otherwise specified in the HPI    Objective:  VITAL SIGNS: /70   Pulse 82   Temp 36.5 °C (97.7 °F) (Oral)   Resp 18   Ht 1.626 m (5' 4\")   Wt 77.1 kg (170 lb 1 oz)   LMP 01/10/1975   SpO2 98%   BMI 29.19 kg/m²     GEN: No apparent distress  HEENT: Head normocephalic, atraumatic.  Sclera nonicteric bilaterally, no ocular discharge appreciated bilaterally.  CV: Extremities warm and well-perfused, RLE swelling compared to left.   PULMONARY: Breathing nonlabored on supp O2  SKIN: No appreciable skin breakdown on exposed areas of skin.  PSYCH: Mood and affect within normal limits.  NEURO: Awake alert.  Conversational.  Logical thought content.    Laboratory Values:  Recent Results (from the past 72 hour(s))   URINALYSIS    Collection Time: 04/14/24  5:50 PM    Specimen: Urine, Cath   Result Value Ref Range    Color Yellow     Character Cloudy (A)     Specific Gravity 1.020 <1.035    Ph 5.5 5.0 - 8.0    Glucose 250 (A) Negative mg/dL    Ketones Negative Negative mg/dL    Protein Negative Negative mg/dL    Bilirubin Negative Negative    Urobilinogen, Urine 0.2 Negative    Nitrite Negative Negative    Leukocyte Esterase Negative Negative    Occult Blood Negative Negative    Micro Urine Req Microscopic    URINE MICROSCOPIC (W/UA)    Collection Time: 04/14/24  5:50 PM   Result Value Ref Range    WBC 0-2 /hpf    RBC 0-2 /hpf    Bacteria Few (A) None /hpf    Epithelial Cells Few /hpf    Hyaline Cast 3-5 (A) /lpf    Budding Yeast Present (A) Absent /hpf   URINE CULTURE-EXISTING-LESS THAN 48 HOURS    Collection Time: 04/14/24  5:50 PM    Specimen: Urine, " Straight Cath   Result Value Ref Range    Significant Indicator POS (POS)     Source UR     Site URINE, STRAIGHT CATH     Culture Result - (A)     Culture Result Candida albicans  10-50,000 cfu/mL   (A)    PROCALCITONIN    Collection Time: 04/15/24  6:02 AM   Result Value Ref Range    Procalcitonin 0.09 <0.25 ng/mL   PHOSPHORUS    Collection Time: 04/15/24  6:02 AM   Result Value Ref Range    Phosphorus 2.1 (L) 2.5 - 4.5 mg/dL   CBC WITH DIFFERENTIAL    Collection Time: 04/16/24  6:51 AM   Result Value Ref Range    WBC 11.4 (H) 4.8 - 10.8 K/uL    RBC 4.07 (L) 4.20 - 5.40 M/uL    Hemoglobin 13.1 12.0 - 16.0 g/dL    Hematocrit 40.3 37.0 - 47.0 %    MCV 99.0 (H) 81.4 - 97.8 fL    MCH 32.2 27.0 - 33.0 pg    MCHC 32.5 32.2 - 35.5 g/dL    RDW 48.1 35.9 - 50.0 fL    Platelet Count 374 164 - 446 K/uL    MPV 9.7 9.0 - 12.9 fL    Neutrophils-Polys 61.60 44.00 - 72.00 %    Lymphocytes 27.60 22.00 - 41.00 %    Monocytes 7.80 0.00 - 13.40 %    Eosinophils 0.70 0.00 - 6.90 %    Basophils 0.70 0.00 - 1.80 %    Immature Granulocytes 1.60 (H) 0.00 - 0.90 %    Nucleated RBC 0.00 0.00 - 0.20 /100 WBC    Neutrophils (Absolute) 7.05 1.82 - 7.42 K/uL    Lymphs (Absolute) 3.16 1.00 - 4.80 K/uL    Monos (Absolute) 0.89 (H) 0.00 - 0.85 K/uL    Eos (Absolute) 0.08 0.00 - 0.51 K/uL    Baso (Absolute) 0.08 0.00 - 0.12 K/uL    Immature Granulocytes (abs) 0.18 (H) 0.00 - 0.11 K/uL    NRBC (Absolute) 0.00 K/uL   Basic Metabolic Panel    Collection Time: 04/16/24  6:51 AM   Result Value Ref Range    Sodium 143 135 - 145 mmol/L    Potassium 5.1 3.6 - 5.5 mmol/L    Chloride 102 96 - 112 mmol/L    Co2 32 20 - 33 mmol/L    Glucose 128 (H) 65 - 99 mg/dL    Bun 38 (H) 8 - 22 mg/dL    Creatinine 0.84 0.50 - 1.40 mg/dL    Calcium 9.8 8.5 - 10.5 mg/dL    Anion Gap 9.0 7.0 - 16.0   ESTIMATED GFR    Collection Time: 04/16/24  6:51 AM   Result Value Ref Range    GFR (CKD-EPI) 68 >60 mL/min/1.73 m 2       Medications:  Scheduled Medications   Medication Dose  Frequency    [START ON 4/18/2024] fluconazole  100 mg DAILY    senna-docusate  1 Tablet BID    polyethylene glycol/lytes  1 Packet DAILY    levothyroxine  37.5 mcg AM ES    montelukast  10 mg Nightly    Pharmacy Consult Request  1 Each PHARMACY TO DOSE    atenolol  50 mg Q EVENING    Dextromethorphan Polistirex ER  60 mg BID    fluticasone-umeclidinium-vilanterol  1 Puff QDAILY (RT)    mirtazapine  15 mg QHS    rivaroxaban  15 mg PM MEAL    sertraline  50 mg DAILY    vibegron  75 mg DAILY     PRN medications: bisacodyl, Respiratory Therapy Consult, hydrALAZINE, carboxymethylcellulose, benzocaine-menthol, traZODone, sodium chloride, acetaminophen, ALPRAZolam, benzonatate, ipratropium-albuterol, menthol    Diet:  Current Diet Order   Procedures    Diet Order Diet: Regular (cruch meds that can be crushe in puree. float others one at a time,  up in wheel chair and in dining room for all meals)       Medical Decision Making and Plan:  Acute heart failure with preserved ejection fraction  PT and OT for mobility and ADLs. Per guidelines, 15 hours per week between PT, OT and/or SLP.  Follow-up cardiology  BNP in the a.m.  Prior to admission: Lasix 60 mg IV twice daily, lisinopril 5 mg daily, Aldactone 25 mg daily on HOLD due to hypotension from aggressive diuresis s/p gentle fluids  Hospitalist consult    Dysphagia  SLP following      Acute hypoxemic respiratory failure secondary to multifocal pneumonia seen on CT  Procalcitonin, WBC within normal limits.  Being treated with Zithromax through 4/14 for possible atypical pneumonia  RT to consult  Encourage IS  Pep therapy  DuoNeb as needed  Continue Delsym  Continue Trelegy Ellipta  Pulm recommendation just prior to rehab admission is for short course of prednisone 40 mg daily for 5 days.  Initiating 4/13, completed 4/17.      Hypertension  Hypotension  Prior to admission: Lasix 60 mg IV twice daily, lisinopril 5 mg daily, Aldactone 25 mg daily on HOLD due to hypotension from  aggressive diuresis s/p gentle fluids   VS with some aberrant elevations but mostly normal. Hospitalist following.     Leukocytosis  UA culture + Candida albicans. Start Fluconazole  Mild improvement in WBC to 11.4     Atrial fibrillation  Continue Xarelto 15 mg daily  Continue atenolol 50 mg every evening     TANIA  Secondary to aggressive diuresis  Monitor labs - renal function stable/improving   improving mildly      Hypothyroidism  Continue Synthroid 50 mcg each morning     Pain  Tylenol as needed     Skin  Patient at risk for skin breakdown due to debility in areas including sacrum, achilles, elbows and head in addition to other sites. Nursing to assess skin daily.      Poor appetite  Depression  Continue mirtazapine 15 mg nightly  Continue sertraline 50 mg daily     Bowel   Patient on Senna-docusate for constipation prophylaxis.      Bladder  TV/PVR/BS PRN  Continue vibegron 75 mg daily  UA positive for few bacteria and budding yeast. Not on meds. Culture PENDING      Fungal/yeast within skin folds  Continue nystatin powder through 4/15    RLE Swelling  US Right LE        Upcoming Labs/imagin/18 - CBC and BMP     DVT PROPHYLAXIS: Xarelto 15 mg daily     HOSPITALIST FOLLOWING: Yes consulted on admission - d/w hospitalist      CODE STATUS: Full code     DISPO: Home to [facility] versus [facility]     ERLINDA: 24     ADDITIONAL MEDICAL NEEDS ON D/C (IV abx, O2, etc): TBD     MEDS SENT TO: TBD     DISCHARGE SPECIALIST FOLLOW UP: Cardiology     Patient to scheduled follow up with their PCP within 2 weeks from discharge from the [facility].      ____________________________________    Dr. Lisa Mercedes DO, MS  [facility] - Physical Medicine & Rehabilitation   ____________________________________    _____________________________________  Interdisciplinary Team Conference   Most recent IDT on 2024    IDr. Lisa DO, MS, was present and led the interdisciplinary  team conference on 4/17/2024.  I led the IDT conference and agree with the IDT conference documentation and plan of care as noted below.     Nursing:  Diet Current Diet Order   Procedures    Diet Order Diet: Regular (cruch meds that can be crushe in puree. float others one at a time,  up in wheel chair and in dining room for all meals)       Eating ADL Supervision  Increased time, Modified diet, Set-up of equipment or meal/tube feeding, Supervision for safety, Verbal cueing   % of Last Meal  Oral Nutrition: Breakfast, Between % Consumed   Sleep    Bowel Last BM: 04/16/24   Bladder Continent    Doesn't report a lot of pain  Meds crushed in applesauce  On O2    Physical Therapy:  Bed Mobility    Transfers Standby Assist  Adaptive equipment, Initial preparation for task, Set-up of equipment, Supervision for safety, Verbal cueing (SPT w/ FWW)   Mobility Contact Guard Assist   Stairs    Walk 120 ft CGA 2x   Scooter, walker, SPC at home  HH PT    Occupational Therapy:  Grooming Modified Independent, Seated   Bathing Minimal Assist   UB Dressing Supervision   LB Dressing Minimal Assist   Toileting Standby Assist   Shower & Transfer SBA   Generalized weakness  HH OT  Has met 5/5 goals    Speech-Language Pathology:  Comprehension:  Minimal Assist  Comprehension Description:  Glasses, Verbal cues  Expression:  Supervision  Expression Description:  Verbal cueing  Social Interaction:  Supervision  Social Interaction Description:  Increased time, Verbal cues, Schedule  Problem Solving:  Moderate Assist  Problem Solving Description:  Bed/chair alarm, Increased time, Seat belt, Supervision, Therapy schedule  Memory:  Maximal Assist  Memory Description:  Bed/chair alarm, Increased time, Seat belt, Supervision, Therapy schedule, Verbal cueing    Mod - Max for problem solving   Has functional swallow     Respiratory Therapy:  O2 (LPM): 1  O2 Delivery Device: Nasal Cannula    Case Management:  Continues to work on disposition  and DME needs.     BARRIERS TO DISCHARGE HOME:  Family training  Equipment  OP vs HH services   Medical Stability     Discharge Date/Disposition:  4/23/24  _____________________________________    Total time:  55 minutes. Time spent included pre-rounding review of vitals and tests, unit/floor time, face-to-face time with the patient including physical examination, care coordination, counseling of patient and/or family, ordering medications/procedures/tests, discussion with CM, PT, OT, SLP and/or other healthcare providers, and documentation in the electronic medical record.

## 2024-04-17 NOTE — THERAPY
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       Objective       04/17/24 1001   OT Charge Group   OT Self Care / ADL (Units) 4   OT Total Time Spent   OT Individual Total Time Spent (Mins) 60   Precautions   Precautions Fall Risk   Vitals   O2 (LPM) 1   O2 Delivery Device Nasal Cannula   Functional Level of Assist   Eating Supervision   Grooming Modified Independent;Seated   Bathing Supervision   Bathing Description Grab bar;Hand held shower;Tub bench;Set-up of equipment;Supervision for safety   Upper Body Dressing Supervision   Upper Body Dressing Description Set-up of equipment   Lower Body Dressing Standby Assist   Lower Body Dressing Description Set-up of equipment;Supervision for safety;Verbal cueing;Increased time  (to don/doff pullups, pants, socks)   Tub / Shower Transfers Standby Assist   Tub Shower Transfer Description Grab bar;Shower bench;Set-up of equipment;Supervision for safety   Interdisciplinary Plan of Care Collaboration   IDT Collaboration with  Nursing;Respiratory Therapist   Patient Position at End of Therapy Seated;Chair Alarm On;Self Releasing Lap Belt Applied;Call Light within Reach;Tray Table within Reach;Phone within Reach   Collaboration Comments RN Jamal assessed wound on bottom; RT Winston worked with patient         Assessment    Patient completed adl routine with setup and supervision with good safety.    Strengths: Able to follow instructions, Independent prior level of function, Motivated for self care and independence, Pleasant and cooperative, Willingly participates in therapeutic activities  Barriers: Decreased endurance, Fatigue, Generalized weakness, Hypotension, Impaired activity tolerance, Impaired  balance, Impaired carryover of learning, Impaired insight/denial of deficits, Impaired functional cognition, Limited mobility    Plan    ADLs, transfers with FWW, strength/endurance building, standing tolerance/balance     DME  OT DME Recommendations  Additional Equipment:  (Non aticipated; pt has FWW, SPC, scooter)        Occupational Therapy Goals (Active)       Problem: Bathing       Dates: Start:  04/13/24         Goal: STG-Within one week, patient will bathe at SBA level using DME/AE PRN.       Dates: Start:  04/13/24               Problem: Dressing       Dates: Start:  04/13/24         Goal: STG-Within one week, patient will dress LB at CGA level using DME/AE PRN.       Dates: Start:  04/13/24               Problem: Functional Transfers       Dates: Start:  04/13/24         Goal: STG-Within one week, patient will transfer to toilet at SBA level using DME/AE PRN.       Dates: Start:  04/13/24            Goal: STG-Within one week, patient will transfer to step in shower at SBA level using DME/AE PRN.       Dates: Start:  04/13/24               Problem: OT Long Term Goals       Dates: Start:  04/13/24         Goal: LTG-By discharge, patient will complete basic self care tasks at SPV-Mod I level using DME/AE PRN.       Dates: Start:  04/13/24            Goal: LTG-By discharge, patient will perform bathroom transfers at SPV-Mod I level using DME/AE PRN.       Dates: Start:  04/13/24               Problem: Toileting       Dates: Start:  04/13/24         Goal: STG-Within one week, patient will complete toileting tasks at SBA level using DME/AE PRN.        Dates: Start:  04/13/24

## 2024-04-17 NOTE — PROGRESS NOTES
NURSING DAILY NOTE    Name: Donte Magaña   Date of Admission: 4/12/2024   Admitting Diagnosis: Acute diastolic heart failure (HCC)  Attending Physician: Lisa Mercedes D.o.  Allergies: Amiodarone, Bactrim, Cipro xr, Metoprolol, Morphine, Phytoplex z-guard [petrolatum-zinc oxide], Pseudoephedrine, Qvar [beclomethasone dipropionate], Vibramycin, Atorvastatin calcium-polysorbate 80, Augmentin, Diltiazem, Flecainide, Keflex, Mucinex, Tramadol, Atorvastatin, and Tape    Safety  Patient Assist  min  Patient Precautions  Fall Risk  Precaution Comments  Pacemaker 2 years old per pt report  Bed Transfer Status  Standby Assist  Toilet Transfer Status   Standby Assist  Assistive Devices  Rails, Wheelchair  Oxygen  Silicone Nasal Cannula  Diet/Therapeutic Dining  Current Diet Order   Procedures    Diet Order Diet: Regular (cruch meds that can be crushe in puree. float others one at a time,  up in wheel chair and in dining room for all meals)     Pill Administration  crushed and floated  Agitated Behavioral Scale     ABS Level of Severity       Fall Risk  Has the patient had a fall this admission?   No  Renetta Chauhan Fall Risk Scoring  22, HIGH RISK  Fall Risk Safety Measures  bed alarm, chair alarm, and poor balance    Vitals  Temperature: 37 °C (98.6 °F)  Temp src: Oral  Pulse: 69  Respiration: 18  Blood Pressure : 128/70  Blood Pressure MAP (Calculated): 89 MM HG  BP Location: Right, Upper Arm  Patient BP Position: Supine     Oxygen  Pulse Oximetry: 93 %  O2 (LPM): 1  O2 Delivery Device: Silicone Nasal Cannula    Bowel and Bladder  Last Bowel Movement  04/16/24  Stool Type  Type 4: Like a sausage or snake, smooth and soft  Bowel Device  Bathroom, Diaper  Continent  Bladder:     Bowel:    Bladder Function  Urine Void (mL):  (Moderate)  Number of Times Voided: 1  Urine Color: Unable To Evaluate  Urine Clarity: Clear  Urine Odor: Malodorous  Number of Times  Incontinent of Urine: 0  Straight Catheter: 400 ml  Genitourinary Assessment   Bladder Assessment (WDL):  WDL Except  Pruett Catheter: Not Applicable  Urine Color: Unable To Evaluate  Urine Clarity: Clear  Urine Odor: Malodorous  Number of Bladder Accidents: 1  Total Number of Bladder of Accidents in Last 7 Days: 1  Number of Times Incontinent of Urine: 0  Bladder Device: Bathroom, Diaper  Bladder Scan: Post Void  $ Bladder Scan Results (mL): 150    Skin  Ashutosh Score   18  Sensory Interventions   Bed Types: Low Airlo  Skin Preventative Measures: Pillows in Use for Support / Positioning  Moisture Interventions  Moisturizers/Barriers: Barrier Wipes      Pain  Pain Rating Scale  0 - No Pain  Pain Location     Pain Location Orientation     Pain Interventions   Declines    ADLs    Bathing      Linen Change      Personal Hygiene  Moist Edie Wipes  Chlorhexidine Bath      Oral Care  Brushed Teeth (Self)  Teeth/Dentures     Shave     Nutrition Percentage Eaten  Lunch, Between 25-50% Consumed  Environmental Precautions  Treaded Slipper Socks on Patient, Personal Belongings, Wastebasket, Call Bell etc. in Easy Reach, Transferred to Stronger Side, Report Given to Other Health Care Providers Regarding Fall Risk, Bed in Low Position  Patient Turns/Positioning  Patient Turns Self from Side to Side  Patient Turns Assistance/Tolerance     Bed Positions  Bed Controls On  Head of Bed Elevated  Self regulated      Psychosocial/Neurologic Assessment  Psychosocial Assessment  Psychosocial (WDL):  Within Defined Limits  Patient Behaviors: Fatigue, Forgetful  Neurologic Assessment  Neuro (WDL): Exceptions to WDL  Level of Consciousness: Alert  Orientation Level: Oriented to person, Oriented to situation, Disoriented to place, Disoriented to time  Cognition: Confused in conversation  Speech: Delayed responses  Pupil Assesment: No  R Pupil Size (mm): 3  R Pupil Shape / Description: Round  R Pupil Reaction: Brisk  L Pupil Size (mm): 3  L  Pupil Shape / Description: Round  L Pupil Reaction: Brisk  EENT (WDL):  WDL Except    Cardio/Pulmonary Assessment  Edema      Respiratory Breath Sounds  RUL Breath Sounds: Diminished  RML Breath Sounds: Diminished  RLL Breath Sounds: Diminished  DO Breath Sounds: Diminished  LLL Breath Sounds: Diminished  Cardiac Assessment   Cardiac (WDL):  WDL Except (hx htn, pacer, mvr, cad, afib)

## 2024-04-17 NOTE — THERAPY
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     Objective       04/17/24 0701   PT Charge Group   PT Gait Training (Units) 2   PT Therapeutic Activities (Units) 2   Supervising Physical Therapist Maddie Deng   PT Total Time Spent   PT Individual Total Time Spent (Mins) 60   Vitals   Pulse Oximetry 92 %   O2 (LPM) 1   O2 Delivery Device Nasal Cannula   Cognition    Level of Consciousness Alert   Attention Impaired   Gait Functional Level of Assist    Gait Level Of Assist Contact Guard Assist   Assistive Device Front Wheel Walker   Distance (Feet) 120   # of Times Distance was Traveled 2   Deviation Decreased Heel Strike;Decreased Toe Off  (cues for FWW proximity)   Transfer Functional Level of Assist   Bed, Chair, Wheelchair Transfer Standby Assist   Bed Chair Wheelchair Transfer Description Adaptive equipment;Initial preparation for task;Set-up of equipment;Supervision for safety;Verbal cueing  (SPT w/ FWW)   Toilet Transfers Standby Assist   Toilet Transfer Description Grab bar;Initial preparation for task;Set-up of equipment;Supervision for safety;Verbal cueing  (w/c <> toilet)   Bed Mobility    Supine to Sit Supervised   Sit to Supine Supervised   Sit to Stand Standby Assist   Scooting Supervised   Rolling Modified Independent   Interdisciplinary Plan of Care Collaboration   Patient Position at End of Therapy Seated  (in Tdine)     UB dressing at EOB w/ set up A  PTA assisted w/ LB dressing on toilet for time consuming, updated CNA bladder incontinence.  Bed mobility assessed in ADL bedroom on [facility] standard bed w/o portable bed rail but she intermittently utilize headboard from the bed frame for mobility, unable to recall  if there's headboard in [facility]      Assessment    Pt tolerated session well on 1 L O2 and was able to ambulate 120ft x2 w/ CGA, she was primarily using scooter at [facility] so she wasn't too motivated for ambulation task but she was agreeable to walk in order to train on endurance.      Strengths: Able to follow instructions, Motivated for self care and independence, Pleasant and cooperative, Willingly participates in therapeutic activities ([facility]- elevator access)  Barriers: Decreased endurance, Fatigue, Generalized weakness, Impaired activity tolerance, Impaired balance, Limited mobility (PLOF- primary scooter user)    Plan    Ambulation w/ FWW > trial SPC when able  Endurance  BLE strengthening     DME  PT DME Recommendations  Additional Equipment:  (Non aticipated; pt has FWW, SPC, scooter)     Passport items to be completed:  Get in/out of bed safely, in/out of a vehicle, safely use mobility device, walk or wheel around home/community, navigate up and down stairs, show how to get up/down from the ground, ensure home is accessible, demonstrate HEP, complete caregiver training    Physical Therapy Problems (Active)       Problem: Mobility       Dates: Start:  04/13/24         Goal: STG-Within one week, patient will propel wheelchair community >100 ft indoor SPV       Dates: Start:  04/13/24            Goal: STG-Within one week, patient will ambulate household distance 20 ft x 2 indoors with FWW       Dates: Start:  04/13/24               Problem: Mobility Transfers       Dates: Start:  04/13/24         Goal: STG-Within one week, patient will transfer bed to chair SPT FWW CGA       Dates: Start:  04/13/24               Problem: PT-Long Term Goals       Dates: Start:  04/13/24         Goal: LTG-By discharge, patient will propel wheelchair/ scooter mod I >150 ft over various surfaces       Dates: Start:  04/13/24            Goal: LTG-By discharge, patient will ambulate with FWW 50 ft x 2 indoors SPV-mod I        Dates: Start:   04/13/24            Goal: LTG-By discharge, patient will transfer one surface to another mod I with FWW       Dates: Start:  04/13/24

## 2024-04-17 NOTE — PROGRESS NOTES
NURSING DAILY NOTE    Name: Donte Magaña  Date of Admission: 4/12/2024  Admitting Diagnosis: Acute diastolic heart failure (HCC)  Attending Physician: Lisa Mercedes D.o.  Allergies: Amiodarone, Bactrim, Cipro xr, Metoprolol, Morphine, Phytoplex z-guard [petrolatum-zinc oxide], Pseudoephedrine, Qvar [beclomethasone dipropionate], Vibramycin, Atorvastatin calcium-polysorbate 80, Augmentin, Diltiazem, Flecainide, Keflex, Mucinex, Tramadol, Atorvastatin, and Tape    Safety  Patient Assist  omin  Patient Precautions  oFall Risk  Precaution Comments  oPacemaker 2 years old per pt report  Bed Transfer Status  oStandby Assist  Toilet Transfer Status  oStandby Assist  Assistive Devices  oRails, Wheelchair  Oxygen  oNasal Cannula  Diet/Therapeutic Dining  o  Current Diet Order   Procedures    Diet Order Diet: Regular (cruch meds that can be crushe in puree. float others one at a time,  up in wheel chair and in dining room for all meals)     Pill Administration  ocrushed, whole, and floated  Agitated Behavioral Scale  o   ABS Level of Severity  o     Fall Risk  Has the patient had a fall this admission?  Corey  Renetta Chauhan Fall Risk Scoring  o22, HIGH RISK  Fall Risk Safety Measures  renetta alarm and chair alarm    Vitals  Temperature: 36.2 °C (97.1 °F)  Temp src: Temporal  Pulse: 80  Respiration: 17  Blood Pressure : (!) 158/86  Blood Pressure MAP (Calculated): 110 MM HG  BP Location: Left, Upper Arm  Patient BP Position: Supine    Oxygen  Pulse Oximetry: 98 %  O2 (LPM): 1  O2 Delivery Device: Nasal Cannula    Bowel and Bladder  Last Bowel Movement  o04/16/24  Stool Type  oType 6: Fluffy pieces with ragged edges, a mushy stool  Bowel Device  oBathroom, Diaper  Continent  oBladder:    oBowel:    Bladder Function  oUrine Void (mL):  (Moderate)  Number of Times Voided: 1  Urine Color: Yellow  Urine Clarity: Clear  Urine Odor: Malodorous  Number of Times Incontinent of Urine: 0  Straight Catheter: 400 ml  Genitourinary  Assessment  oBladder Assessment (WDL):  WDL Except  Pruett Catheter: Not Applicable  Urine Color: Yellow  Urine Clarity: Clear  Urine Odor: Malodorous  Number of Bladder Accidents: 1  Total Number of Bladder of Accidents in Last 7 Days: 1  Number of Times Incontinent of Urine: 0  Bladder Device: Bathroom  Bladder Scan: Post Void  $ Bladder Scan Results (mL): 150    Skin  Ashutosh Score  o 18  Sensory Interventions  o Bed Types: Low Airloss  Skin Preventative Measures: Pillows in Use for Support / Positioning, Silicone Oxygen Tubing in Use, Gray Foam for Oxygen Tubing (Pad ear protector)  Moisture Interventions  oMoisturizers/Barriers: Barrier Wipes      Pain  Pain Rating Scale  o0 - No Pain  Pain Location  o   Pain Location Orientation  o   Pain Interventions  oDeclines    ADLs    Bathing  o   Linen Change  o   Personal Hygiene  oMoist Edie Wipes  Chlorhexidine Bath  o   Oral Care  oBrushed Teeth (self)  Teeth/Dentures  o   Shave  o   Nutrition Percentage Eaten  oLunch, Between 25-50% Consumed  Environmental Precautions  oTreaded Slipper Socks on Patient, Bed in Low Position  Patient Turns/Positioning  oPatient Turns Self from Side to Side  Patient Turns Assistance/Tolerance  o   Bed Positions  Zach Controls On  Head of Bed Elevated  oLess than 30 degrees      Psychosocial/Neurologic Assessment  Psychosocial Assessment  oPsychosocial (WDL):  Within Defined Limits  Patient Behaviors: Fatigue, Forgetful  Neurologic Assessment  oNeuro (WDL): Exceptions to WDL  Level of Consciousness: Alert  Orientation Level: Oriented to person, Oriented to situation, Disoriented to place, Disoriented to time  Cognition: Confused in conversation  Speech: Delayed responses  Pupil Assesment: No  R Pupil Size (mm): 3  R Pupil Shape / Description: Round  R Pupil Reaction: Brisk  L Pupil Size (mm): 3  L Pupil Shape / Description: Round  L Pupil Reaction: Brisk  oEENT (WDL):  WDL Except    Cardio/Pulmonary Assessment  Edema  o   Respiratory  Breath Sounds  oRUL Breath Sounds: Diminished  RML Breath Sounds: Diminished  RLL Breath Sounds: Diminished  DO Breath Sounds: Diminished  LLL Breath Sounds: Diminished  Cardiac Assessment  oCardiac (WDL):  WDL Except

## 2024-04-17 NOTE — FLOWSHEET NOTE
04/17/24 1011   Events/Summary/Plan   Events/Summary/Plan SpO2 check   Vital Signs   Pulse 82   Respiration 18   Pulse Oximetry 98 %   $ Pulse Oximetry (Spot Check) Yes   Respiratory Assessment   Respiratory Pattern Within Normal Limits   Level of Consciousness Alert   Chest Exam   Work Of Breathing / Effort Within Normal Limits   Breath Sounds   RUL Breath Sounds Clear   RML Breath Sounds Clear   RLL Breath Sounds Rhonchi   DO Breath Sounds Clear   LLL Breath Sounds Clear   Secretions   Cough Strong;Non Productive   How Sputum Obtained Spontaneous   Oxygen   O2 (LPM) 1   O2 Delivery Device Nasal Cannula

## 2024-04-17 NOTE — CASE COMMUNICATION
I agree with changes  ----- Message -----  From: Angelica Stockton R.N.  Sent: 4/9/2024   6:37 AM PDT  To: Kelly Caba R.N.      Quality Review Completed for DC OASIS by LILLIE Stockton, RN on 4/8/2024:     Edits completed by LILLIE Stockton, RN:  1.  is NA to Depression  per care plan interventions  2.  is yes to the flu data timeframe,  is #4 per EMR records no flu administered in 2023-24  3.  is D yes for HTN chronic disea se management  4.  A and F is #1 CG assist per narrative  5.  unchecked A, checked F is taking ointment with indication per MAR  6.  is NA, no indication of significant med issue identified per EMR review  7. [id number] changed to independent per  response  8. [id number] E is independent per  response  9. [id number] is independent per  response  10. [id number] I, J, K changed to independent per  response  11.   changed to 0 per narrative: In the last 24 hours did the patient wear oxygen: no.In the last 24 hours, when is the patient dyspneic or noticeably Short of Breath : n/a.  12. [id number] is independent per  response   is also A for My Chart access

## 2024-04-17 NOTE — PROGRESS NOTES
Hospital Medicine Daily Progress Note        Chief Complaint  Hypertension  Afib  Leukocytosis    Interval Problem Update  Discussed about her urine cultures showing yeast and have started DIflucan.    Review of Systems  Review of Systems   Constitutional:  Negative for fever.   Eyes:  Negative for blurred vision.   Respiratory:  Negative for shortness of breath.    Cardiovascular:  Negative for palpitations.   Gastrointestinal:  Negative for nausea and vomiting.   Neurological:  Negative for dizziness and headaches.   Psychiatric/Behavioral:  Negative for hallucinations.         Physical Exam  Temp:  [36.2 °C (97.1 °F)-37 °C (98.6 °F)] 36.5 °C (97.7 °F)  Pulse:  [72-82] 82  Resp:  [17-18] 18  BP: (118-158)/(69-86) 118/70  SpO2:  [92 %-98 %] 98 %    Physical Exam  Vitals and nursing note reviewed.   Constitutional:       General: She is not in acute distress.  HENT:      Mouth/Throat:      Mouth: Mucous membranes are moist.      Pharynx: Oropharynx is clear.   Eyes:      General: No scleral icterus.  Neck:      Vascular: No JVD.   Cardiovascular:      Rate and Rhythm: Normal rate and regular rhythm.      Heart sounds: Normal heart sounds. No murmur heard.  Pulmonary:      Effort: Pulmonary effort is normal.      Breath sounds: Normal breath sounds. No stridor.   Abdominal:      General: There is no distension.      Palpations: Abdomen is soft.      Tenderness: There is no abdominal tenderness.   Musculoskeletal:      Cervical back: No rigidity.      Right lower leg: No edema.      Left lower leg: No edema.   Skin:     General: Skin is warm and dry.      Findings: No rash.   Neurological:      Mental Status: She is alert and oriented to person, place, and time.   Psychiatric:         Mood and Affect: Mood normal.         Behavior: Behavior normal.         Fluids    Intake/Output Summary (Last 24 hours) at 4/17/2024 1130  Last data filed at 4/17/2024 0900  Gross per 24 hour   Intake 310 ml   Output --   Net 310 ml        Laboratory  Recent Labs     04/16/24  0651   WBC 11.4*   RBC 4.07*   HEMOGLOBIN 13.1   HEMATOCRIT 40.3   MCV 99.0*   MCH 32.2   MCHC 32.5   RDW 48.1   PLATELETCT 374   MPV 9.7     Recent Labs     04/16/24  0651   SODIUM 143   POTASSIUM 5.1   CHLORIDE 102   CO2 32   GLUCOSE 128*   BUN 38*   CREATININE 0.84   CALCIUM 9.8                   Assessment/Plan  Hypophosphatemia  Assessment & Plan  PO4: 2.1  Cont supplements  Cont to monitor    Acute respiratory failure (HCC)- (present on admission)  Assessment & Plan  [place] [facility]  CT bilateral ground glass opacities, negative for PE  Echo: EF 60%, grade II diastolic dysfunction, mild MR/mod AI  BNP: 622 --> 1233 --> 1144 (4/14)  CXR: negative acute  Soft tissue neck X-ray: unremarkable  S/P Azithromycin  On Prednisone  On Trelegy, Singulair, and Delsym  Resp & O2 protocols as needed    Essential hypertension- (present on admission)  Assessment & Plan  BP ok  Cont Atenolol  Cont to monitor    Azotemia- (present on admission)  Assessment & Plan  Bun: 46 --> 38  ? 2nd to aggressive diuresis at [facility] [place]  Encouraging fluid intake  Continue to hold Lasix, Aldactone, and Lisinopril  Cont to monitor    Mild Leukocytosis- (present on admission)  Assessment & Plan  WBC's: 12.1 (4/14) --> 11.4 (4/16)  Has been afebrile  Likely 2nd to steroids  PCT: 0.09  UA: showed 0-2 WBC, few bacteria, yeast present  CXR: negative acute  Urine cultures show Candida  Will give Diflucan  Cont to monitor    Multiple drug allergies- (present on admission)  Assessment & Plan  Extensive list noted    Persistent atrial fibrillation (HCC)- (present on admission)  Assessment & Plan  S/P PPM  Cont Atenolol  Cont Xarelto    Mild episode of recurrent major depressive disorder (HCC)- (present on admission)  Assessment & Plan  Cont Zoloft    Hypothyroidism- (present on admission)  Assessment & Plan  TSH: 0.18  FT4: 1.53  On Synthroid -- dose decreased recently by Dr. Crowell  Needs outpatient follow up

## 2024-04-18 ENCOUNTER — APPOINTMENT (OUTPATIENT)
Dept: SPEECH THERAPY | Facility: REHABILITATION | Age: 84
DRG: 291 | End: 2024-04-18
Attending: GENERAL PRACTICE
Payer: MEDICARE

## 2024-04-18 ENCOUNTER — APPOINTMENT (OUTPATIENT)
Dept: PHYSICAL THERAPY | Facility: REHABILITATION | Age: 84
DRG: 291 | End: 2024-04-18
Attending: PHYSICAL MEDICINE & REHABILITATION
Payer: MEDICARE

## 2024-04-18 ENCOUNTER — APPOINTMENT (OUTPATIENT)
Dept: OCCUPATIONAL THERAPY | Facility: REHABILITATION | Age: 84
DRG: 291 | End: 2024-04-18
Attending: PHYSICAL MEDICINE & REHABILITATION
Payer: MEDICARE

## 2024-04-18 ENCOUNTER — APPOINTMENT (OUTPATIENT)
Dept: INPATIENT REHAB | Facility: REHABILITATION | Age: 84
DRG: 291 | End: 2024-04-18
Payer: MEDICARE

## 2024-04-18 LAB
ANION GAP SERPL CALC-SCNC: 11 MMOL/L (ref 7–16)
BASOPHILS # BLD AUTO: 0.3 % (ref 0–1.8)
BASOPHILS # BLD: 0.04 K/UL (ref 0–0.12)
BUN SERPL-MCNC: 38 MG/DL (ref 8–22)
CALCIUM SERPL-MCNC: 9.9 MG/DL (ref 8.5–10.5)
CHLORIDE SERPL-SCNC: 98 MMOL/L (ref 96–112)
CO2 SERPL-SCNC: 32 MMOL/L (ref 20–33)
CREAT SERPL-MCNC: 0.75 MG/DL (ref 0.5–1.4)
EOSINOPHIL # BLD AUTO: 0.09 K/UL (ref 0–0.51)
EOSINOPHIL NFR BLD: 0.6 % (ref 0–6.9)
ERYTHROCYTE [DISTWIDTH] IN BLOOD BY AUTOMATED COUNT: 48.4 FL (ref 35.9–50)
GFR SERPLBLD CREATININE-BSD FMLA CKD-EPI: 78 ML/MIN/1.73 M 2
GLUCOSE SERPL-MCNC: 145 MG/DL (ref 65–99)
HCT VFR BLD AUTO: 41.8 % (ref 37–47)
HGB BLD-MCNC: 13.5 G/DL (ref 12–16)
IMM GRANULOCYTES # BLD AUTO: 0.27 K/UL (ref 0–0.11)
IMM GRANULOCYTES NFR BLD AUTO: 1.8 % (ref 0–0.9)
LYMPHOCYTES # BLD AUTO: 3.05 K/UL (ref 1–4.8)
LYMPHOCYTES NFR BLD: 20.8 % (ref 22–41)
MCH RBC QN AUTO: 32.4 PG (ref 27–33)
MCHC RBC AUTO-ENTMCNC: 32.3 G/DL (ref 32.2–35.5)
MCV RBC AUTO: 100.2 FL (ref 81.4–97.8)
MONOCYTES # BLD AUTO: 0.98 K/UL (ref 0–0.85)
MONOCYTES NFR BLD AUTO: 6.7 % (ref 0–13.4)
NEUTROPHILS # BLD AUTO: 10.23 K/UL (ref 1.82–7.42)
NEUTROPHILS NFR BLD: 69.8 % (ref 44–72)
NRBC # BLD AUTO: 0 K/UL
NRBC BLD-RTO: 0 /100 WBC (ref 0–0.2)
PLATELET # BLD AUTO: 402 K/UL (ref 164–446)
PMV BLD AUTO: 10 FL (ref 9–12.9)
POTASSIUM SERPL-SCNC: 5.1 MMOL/L (ref 3.6–5.5)
RBC # BLD AUTO: 4.17 M/UL (ref 4.2–5.4)
SODIUM SERPL-SCNC: 141 MMOL/L (ref 135–145)
WBC # BLD AUTO: 14.7 K/UL (ref 4.8–10.8)

## 2024-04-18 PROCEDURE — 97530 THERAPEUTIC ACTIVITIES: CPT

## 2024-04-18 PROCEDURE — 97530 THERAPEUTIC ACTIVITIES: CPT | Mod: CQ

## 2024-04-18 PROCEDURE — A9270 NON-COVERED ITEM OR SERVICE: HCPCS | Performed by: HOSPITALIST

## 2024-04-18 PROCEDURE — 36415 COLL VENOUS BLD VENIPUNCTURE: CPT

## 2024-04-18 PROCEDURE — 80048 BASIC METABOLIC PNL TOTAL CA: CPT

## 2024-04-18 PROCEDURE — A9270 NON-COVERED ITEM OR SERVICE: HCPCS | Performed by: GENERAL PRACTICE

## 2024-04-18 PROCEDURE — 700102 HCHG RX REV CODE 250 W/ 637 OVERRIDE(OP): Performed by: HOSPITALIST

## 2024-04-18 PROCEDURE — 97129 THER IVNTJ 1ST 15 MIN: CPT

## 2024-04-18 PROCEDURE — 99232 SBSQ HOSP IP/OBS MODERATE 35: CPT | Performed by: HOSPITALIST

## 2024-04-18 PROCEDURE — 94669 MECHANICAL CHEST WALL OSCILL: CPT

## 2024-04-18 PROCEDURE — 97535 SELF CARE MNGMENT TRAINING: CPT

## 2024-04-18 PROCEDURE — 97110 THERAPEUTIC EXERCISES: CPT | Mod: CQ

## 2024-04-18 PROCEDURE — 770010 HCHG ROOM/CARE - REHAB SEMI PRIVAT*

## 2024-04-18 PROCEDURE — 94760 N-INVAS EAR/PLS OXIMETRY 1: CPT

## 2024-04-18 PROCEDURE — 700102 HCHG RX REV CODE 250 W/ 637 OVERRIDE(OP): Performed by: PHYSICAL MEDICINE & REHABILITATION

## 2024-04-18 PROCEDURE — 85025 COMPLETE CBC W/AUTO DIFF WBC: CPT

## 2024-04-18 PROCEDURE — 94640 AIRWAY INHALATION TREATMENT: CPT

## 2024-04-18 PROCEDURE — 700102 HCHG RX REV CODE 250 W/ 637 OVERRIDE(OP): Performed by: GENERAL PRACTICE

## 2024-04-18 PROCEDURE — A9270 NON-COVERED ITEM OR SERVICE: HCPCS | Performed by: PHYSICAL MEDICINE & REHABILITATION

## 2024-04-18 PROCEDURE — 97130 THER IVNTJ EA ADDL 15 MIN: CPT

## 2024-04-18 RX ADMIN — LEVOTHYROXINE SODIUM 37.5 MCG: 0.07 TABLET ORAL at 05:23

## 2024-04-18 RX ADMIN — SENNOSIDES AND DOCUSATE SODIUM 1 TABLET: 50; 8.6 TABLET ORAL at 21:08

## 2024-04-18 RX ADMIN — MIRTAZAPINE 15 MG: 15 TABLET, ORALLY DISINTEGRATING ORAL at 21:08

## 2024-04-18 RX ADMIN — DEXTROMETHORPHAN POLISTIREX 60 MG: 30 SUSPENSION ORAL at 08:44

## 2024-04-18 RX ADMIN — FLUTICASONE FUROATE, UMECLIDINIUM BROMIDE AND VILANTEROL TRIFENATATE 1 PUFF: 100; 62.5; 25 POWDER RESPIRATORY (INHALATION) at 06:47

## 2024-04-18 RX ADMIN — SENNOSIDES AND DOCUSATE SODIUM 1 TABLET: 50; 8.6 TABLET ORAL at 08:46

## 2024-04-18 RX ADMIN — RIVAROXABAN 20 MG: 20 TABLET, FILM COATED ORAL at 18:00

## 2024-04-18 RX ADMIN — MONTELUKAST 10 MG: 10 TABLET, FILM COATED ORAL at 21:08

## 2024-04-18 RX ADMIN — ATENOLOL 50 MG: 25 TABLET ORAL at 21:08

## 2024-04-18 RX ADMIN — POLYETHYLENE GLYCOL 3350 1 PACKET: 17 POWDER, FOR SOLUTION ORAL at 08:44

## 2024-04-18 RX ADMIN — SERTRALINE HYDROCHLORIDE 50 MG: 50 TABLET ORAL at 08:45

## 2024-04-18 RX ADMIN — VIBEGRON 75 MG: 75 TABLET, FILM COATED ORAL at 09:00

## 2024-04-18 RX ADMIN — FLUCONAZOLE 100 MG: 100 TABLET ORAL at 08:45

## 2024-04-18 ASSESSMENT — ENCOUNTER SYMPTOMS
COUGH: 0
FEVER: 0
BLURRED VISION: 0
NERVOUS/ANXIOUS: 0
DIARRHEA: 0
DIZZINESS: 0

## 2024-04-18 ASSESSMENT — ACTIVITIES OF DAILY LIVING (ADL)
TOILET_TRANSFER_DESCRIPTION: GRAB BAR;SET-UP OF EQUIPMENT;SUPERVISION FOR SAFETY;VERBAL CUEING
TOILETING_LEVEL_OF_ASSIST_DESCRIPTION: GRAB BAR;SET-UP OF EQUIPMENT;SUPERVISION FOR SAFETY

## 2024-04-18 ASSESSMENT — PAIN DESCRIPTION - PAIN TYPE
TYPE: ACUTE PAIN
TYPE: ACUTE PAIN

## 2024-04-18 NOTE — PROGRESS NOTES
Hospital Medicine Daily Progress Note        Chief Complaint  Hypertension  Afib  Leukocytosis    Interval Problem Update  No complaints.  Doing ok.    Review of Systems  Review of Systems   Constitutional:  Negative for fever.   Eyes:  Negative for blurred vision.   Respiratory:  Negative for cough.    Cardiovascular:  Negative for chest pain.   Gastrointestinal:  Negative for diarrhea.   Musculoskeletal:  Negative for joint pain.   Neurological:  Negative for dizziness.   Psychiatric/Behavioral:  The patient is not nervous/anxious.         Physical Exam  Temp:  [36.4 °C (97.5 °F)-36.7 °C (98.1 °F)] 36.4 °C (97.5 °F)  Pulse:  [80-86] 80  Resp:  [16-18] 16  BP: (118-144)/(70-78) 144/78  SpO2:  [97 %-98 %] 98 %    Physical Exam  Vitals and nursing note reviewed.   Constitutional:       Appearance: She is not diaphoretic.   HENT:      Mouth/Throat:      Pharynx: No oropharyngeal exudate or posterior oropharyngeal erythema.   Eyes:      Extraocular Movements: Extraocular movements intact.   Neck:      Vascular: No carotid bruit or JVD.   Cardiovascular:      Rate and Rhythm: Normal rate and regular rhythm.      Heart sounds: Normal heart sounds. No murmur heard.  Pulmonary:      Effort: Pulmonary effort is normal.      Breath sounds: Normal breath sounds. No stridor.   Abdominal:      General: Bowel sounds are normal.      Palpations: Abdomen is soft.   Musculoskeletal:      Right lower leg: No edema.      Left lower leg: No edema.   Skin:     General: Skin is warm and dry.      Findings: No rash.   Neurological:      Mental Status: She is alert and oriented to person, place, and time.   Psychiatric:         Mood and Affect: Mood normal.         Behavior: Behavior normal.         Fluids    Intake/Output Summary (Last 24 hours) at 4/18/2024 1055  Last data filed at 4/18/2024 0900  Gross per 24 hour   Intake 360 ml   Output --   Net 360 ml       Laboratory  Recent Labs     04/16/24  0651 04/18/24  0529   WBC 11.4* 14.7*    RBC 4.07* 4.17*   HEMOGLOBIN 13.1 13.5   HEMATOCRIT 40.3 41.8   MCV 99.0* 100.2*   MCH 32.2 32.4   MCHC 32.5 32.3   RDW 48.1 48.4   PLATELETCT 374 402   MPV 9.7 10.0     Recent Labs     04/16/24  0651 04/18/24  0529   SODIUM 143 141   POTASSIUM 5.1 5.1   CHLORIDE 102 98   CO2 32 32   GLUCOSE 128* 145*   BUN 38* 38*   CREATININE 0.84 0.75   CALCIUM 9.8 9.9                   Assessment/Plan  Hypophosphatemia  Assessment & Plan  PO4: 2.1  Cont supplements  Cont to monitor    Acute respiratory failure (HCC)- (present on admission)  Assessment & Plan  Resolved (from [facility])  CT bilateral ground glass opacities, negative for PE  Echo: EF 60%, grade II diastolic dysfunction, mild MR/mod AI  BNP: 622 --> 1233 --> 1144 (4/14)  CXR: negative acute  Soft tissue neck X-ray: unremarkable  S/P Azithromycin  On Prednisone  On Trelegy, Singulair, and Delsym  Note: Lasix & Aldactone on hold 2nd to azotemia  Resp & O2 protocols as needed    Essential hypertension- (present on admission)  Assessment & Plan  BP a little labile but ok  Cont Atenolol  Cont to monitor    Azotemia- (present on admission)  Assessment & Plan  Bun: 46 --> 38 --> 38  Encouraging fluid intake  Continue to hold Lasix, Aldactone, and Lisinopril  Cont to monitor    Mild Leukocytosis- (present on admission)  Assessment & Plan  WBC's: 12.1 (4/14) --> 11.4 (4/16) --> 14.7 (4/18)  Has been afebrile  Likely 2nd to steroids -- now off (last dose 4/17)  PCT: 0.09  UA: showed 0-2 WBC, few bacteria, yeast present  CXR: negative acute  Urine cultures show Candida  Cont Diflucan  Cont to monitor    Multiple drug allergies- (present on admission)  Assessment & Plan  Extensive list noted    Persistent atrial fibrillation (HCC)- (present on admission)  Assessment & Plan  S/P PPM  Cont Atenolol  Cont Xarelto    Mild episode of recurrent major depressive disorder (HCC)- (present on admission)  Assessment & Plan  Cont Zoloft    Hypothyroidism- (present on admission)  Assessment &  Plan  TSH: 0.18  FT4: 1.53  On Synthroid -- dose decreased recently by Dr. Crowell  Needs outpatient follow up

## 2024-04-18 NOTE — CARE PLAN
The patient is Stable - Low risk of patient condition declining or worsening    Shift Goals  Clinical Goals: safety  Patient Goals: sleep well    Progress made toward(s) clinical / shift goals:    Problem: Fall Risk - Rehab  Goal: Patient will remain free from falls  Outcome: Progressing. Patient bed in lowest locked position with bed alarm on and call light within reach. Patient calls appropriately with call light for needs and has not attempted to self transfer.      Problem: Bladder / Voiding  Goal: Patient will establish and maintain regular urinary output  Outcome: Progressing. Patient has remained continent of bladder over shift. Patient calls appropriately with call light to use toilet. Patient denies having any burning with urination over shift.

## 2024-04-18 NOTE — THERAPY
"Physical Therapy   Daily Treatment     Patient Name: Donte Magaña  Age:  83 y.o., Sex:  female  Medical Record #: [id number]  Today's Date: 4/18/2024     Precautions  Precautions: Fall Risk  Comments: Pacemaker 2 years old per pt report    Subjective    Pt seated in w/c upon arrival, agreeable to session.      Objective       04/18/24 0901   PT Charge Group   PT Therapeutic Exercise (Units) 2   PT Therapeutic Activities (Units) 2   Supervising Physical Therapist Maddie Deng   PT Total Time Spent   PT Individual Total Time Spent (Mins) 60   Vitals   Pulse 100   Patient BP Position   (during standing TE)   Pulse Oximetry 93 %   O2 (LPM) 1   O2 Delivery Device Nasal Cannula   Cognition    Level of Consciousness Alert   Stairs Functional Level of Assist   Level of Assist with Stairs Contact Guard Assist   # of Stairs Climbed 4  (6\" w/ BHRs)   Stairs Description Extra time;Hand rails;Supervision for safety;Verbal cueing;Limited by fatigue;Oxygen   Standing Lower Body Exercises   Hamstring Curl 1 set of 10;Bilateral    Hip Flexion 1 set of 10;Bilateral   Hip Extension 1 set of 10;Bilateral    Hip Abduction 1 set of 10;Bilateral   Marching 1 set of 10   Heel Rise 1 set of 10;Bilateral   Toe Rise 1 set of 10;Bilateral   Mini Squat Partial;1 set of 10   Bed Mobility    Sit to Stand Standby Assist   Interdisciplinary Plan of Care Collaboration   Patient Position at End of Therapy Seated;Call Light within Reach;Tray Table within Reach;Phone within Reach     Education provided regarding pressure relief and importance of the mobility to not letting the pressure sore getting worse, pt was able to demonstrated lateral leaning and scooting up in w/c.    Assessment    Pt tolerated session well, she required ~ 1 min seated RB in between each standing TE but demonstrate good strength.      Strengths: Able to follow instructions, Motivated for self care and independence, Pleasant and cooperative, Willingly participates in " therapeutic activities ([facility]- elevator access)  Barriers: Decreased endurance, Fatigue, Generalized weakness, Impaired activity tolerance, Impaired balance, Limited mobility (PLOF- primary scooter user)    Plan    Ambulation w/ FWW > trial SPC when able  Endurance  BLE strengthening     DME  PT DME Recommendations  Additional Equipment:  (Non aticipated; pt has FWW, SPC, scooter)     Passport items to be completed:  Get in/out of bed safely, in/out of a vehicle, safely use mobility device, walk or wheel around home/community, navigate up and down stairs, show how to get up/down from the ground, ensure home is accessible, demonstrate HEP, complete caregiver training    Physical Therapy Problems (Active)       Problem: PT-Long Term Goals       Dates: Start:  04/13/24         Goal: LTG-By discharge, patient will propel wheelchair/ scooter mod I >150 ft over various surfaces       Dates: Start:  04/13/24            Goal: LTG-By discharge, patient will ambulate with FWW 50 ft x 2 indoors SPV-mod I        Dates: Start:  04/13/24            Goal: LTG-By discharge, patient will transfer one surface to another mod I with FWW       Dates: Start:  04/13/24

## 2024-04-18 NOTE — DISCHARGE PLANNING
CM met with patient and her daughter Reina and son in law to update on IDT and DC date of 4/23/24.  Answered questions.  CM will continue to monitor for DC needs.

## 2024-04-18 NOTE — FLOWSHEET NOTE
04/18/24 0646   Events/Summary/Plan   Events/Summary/Plan mdi and flutter   Vital Signs   Pulse 80   Respiration 16   Pulse Oximetry 98 %   $ Pulse Oximetry (Spot Check) Yes   Respiratory Assessment   Level of Consciousness Alert   Chest Exam   Work Of Breathing / Effort Within Normal Limits   Breath Sounds   RUL Breath Sounds Clear   RML Breath Sounds Clear   RLL Breath Sounds Diminished   DO Breath Sounds Clear   LLL Breath Sounds Diminished   Secretions   Cough Strong;Non Productive   Oxygen   O2 (LPM) 1.5   O2 Delivery Device Silicone Nasal Cannula

## 2024-04-18 NOTE — DIETARY
Nutrition services:     Pt's PO intake % of most meals. Supplements removed from pt's menu. PO intake most likely adequate without supplements.     RD following.

## 2024-04-18 NOTE — PROGRESS NOTES
"  Physical Medicine & Rehabilitation Progress Note    Encounter Date: 4/18/2024    Chief Complaint: Feels well    Interval Events (Subjective):  VS: BP largely normal, some outliers which are not too high or low. Requiring less O2  BM 4/17  Voiding     Patient seen and examined in the bathroom working with OT.  She feels well.  Does not notice a difference in going down on the oxygen.  Does not feel sick.    ROS: 14 point ROS negative unless otherwise specified in the HPI    Objective:  VITAL SIGNS: BP (!) 144/78   Pulse 80   Temp 36.4 °C (97.5 °F) (Temporal)   Resp 16   Ht 1.626 m (5' 4\")   Wt 77.1 kg (170 lb 1 oz)   LMP 01/10/1975   SpO2 98%   BMI 29.19 kg/m²     GEN: No apparent distress  HEENT: Head normocephalic, atraumatic.  Sclera nonicteric bilaterally, no ocular discharge appreciated bilaterally.  CV: Extremities warm and well-perfused, right lower extremity swelling present.  PULMONARY: Breathing nonlabored on room air, no respiratory accessory muscle use.  Not requiring supplemental oxygen.  ABD: Soft, nontender.  SKIN: No appreciable skin breakdown on exposed areas of skin.  PSYCH: Mood and affect within normal limits.  NEURO: Awake alert.  Conversational.  Logical thought content.    Laboratory Values:  Recent Results (from the past 72 hour(s))   CBC WITH DIFFERENTIAL    Collection Time: 04/16/24  6:51 AM   Result Value Ref Range    WBC 11.4 (H) 4.8 - 10.8 K/uL    RBC 4.07 (L) 4.20 - 5.40 M/uL    Hemoglobin 13.1 12.0 - 16.0 g/dL    Hematocrit 40.3 37.0 - 47.0 %    MCV 99.0 (H) 81.4 - 97.8 fL    MCH 32.2 27.0 - 33.0 pg    MCHC 32.5 32.2 - 35.5 g/dL    RDW 48.1 35.9 - 50.0 fL    Platelet Count 374 164 - 446 K/uL    MPV 9.7 9.0 - 12.9 fL    Neutrophils-Polys 61.60 44.00 - 72.00 %    Lymphocytes 27.60 22.00 - 41.00 %    Monocytes 7.80 0.00 - 13.40 %    Eosinophils 0.70 0.00 - 6.90 %    Basophils 0.70 0.00 - 1.80 %    Immature Granulocytes 1.60 (H) 0.00 - 0.90 %    Nucleated RBC 0.00 0.00 - 0.20 /100 " WBC    Neutrophils (Absolute) 7.05 1.82 - 7.42 K/uL    Lymphs (Absolute) 3.16 1.00 - 4.80 K/uL    Monos (Absolute) 0.89 (H) 0.00 - 0.85 K/uL    Eos (Absolute) 0.08 0.00 - 0.51 K/uL    Baso (Absolute) 0.08 0.00 - 0.12 K/uL    Immature Granulocytes (abs) 0.18 (H) 0.00 - 0.11 K/uL    NRBC (Absolute) 0.00 K/uL   Basic Metabolic Panel    Collection Time: 04/16/24  6:51 AM   Result Value Ref Range    Sodium 143 135 - 145 mmol/L    Potassium 5.1 3.6 - 5.5 mmol/L    Chloride 102 96 - 112 mmol/L    Co2 32 20 - 33 mmol/L    Glucose 128 (H) 65 - 99 mg/dL    Bun 38 (H) 8 - 22 mg/dL    Creatinine 0.84 0.50 - 1.40 mg/dL    Calcium 9.8 8.5 - 10.5 mg/dL    Anion Gap 9.0 7.0 - 16.0   ESTIMATED GFR    Collection Time: 04/16/24  6:51 AM   Result Value Ref Range    GFR (CKD-EPI) 68 >60 mL/min/1.73 m 2   CBC WITH DIFFERENTIAL    Collection Time: 04/18/24  5:29 AM   Result Value Ref Range    WBC 14.7 (H) 4.8 - 10.8 K/uL    RBC 4.17 (L) 4.20 - 5.40 M/uL    Hemoglobin 13.5 12.0 - 16.0 g/dL    Hematocrit 41.8 37.0 - 47.0 %    .2 (H) 81.4 - 97.8 fL    MCH 32.4 27.0 - 33.0 pg    MCHC 32.3 32.2 - 35.5 g/dL    RDW 48.4 35.9 - 50.0 fL    Platelet Count 402 164 - 446 K/uL    MPV 10.0 9.0 - 12.9 fL    Neutrophils-Polys 69.80 44.00 - 72.00 %    Lymphocytes 20.80 (L) 22.00 - 41.00 %    Monocytes 6.70 0.00 - 13.40 %    Eosinophils 0.60 0.00 - 6.90 %    Basophils 0.30 0.00 - 1.80 %    Immature Granulocytes 1.80 (H) 0.00 - 0.90 %    Nucleated RBC 0.00 0.00 - 0.20 /100 WBC    Neutrophils (Absolute) 10.23 (H) 1.82 - 7.42 K/uL    Lymphs (Absolute) 3.05 1.00 - 4.80 K/uL    Monos (Absolute) 0.98 (H) 0.00 - 0.85 K/uL    Eos (Absolute) 0.09 0.00 - 0.51 K/uL    Baso (Absolute) 0.04 0.00 - 0.12 K/uL    Immature Granulocytes (abs) 0.27 (H) 0.00 - 0.11 K/uL    NRBC (Absolute) 0.00 K/uL   Basic Metabolic Panel    Collection Time: 04/18/24  5:29 AM   Result Value Ref Range    Sodium 141 135 - 145 mmol/L    Potassium 5.1 3.6 - 5.5 mmol/L    Chloride 98 96 -  112 mmol/L    Co2 32 20 - 33 mmol/L    Glucose 145 (H) 65 - 99 mg/dL    Bun 38 (H) 8 - 22 mg/dL    Creatinine 0.75 0.50 - 1.40 mg/dL    Calcium 9.9 8.5 - 10.5 mg/dL    Anion Gap 11.0 7.0 - 16.0   ESTIMATED GFR    Collection Time: 04/18/24  5:29 AM   Result Value Ref Range    GFR (CKD-EPI) 78 >60 mL/min/1.73 m 2       Medications:  Scheduled Medications   Medication Dose Frequency    rivaroxaban  20 mg PM MEAL    fluconazole  100 mg DAILY    senna-docusate  1 Tablet BID    polyethylene glycol/lytes  1 Packet DAILY    levothyroxine  37.5 mcg AM ES    montelukast  10 mg Nightly    Pharmacy Consult Request  1 Each PHARMACY TO DOSE    atenolol  50 mg Q EVENING    Dextromethorphan Polistirex ER  60 mg BID    fluticasone-umeclidinium-vilanterol  1 Puff QDAILY (RT)    mirtazapine  15 mg QHS    sertraline  50 mg DAILY    vibegron  75 mg DAILY     PRN medications: bisacodyl, Respiratory Therapy Consult, hydrALAZINE, carboxymethylcellulose, benzocaine-menthol, traZODone, sodium chloride, acetaminophen, ALPRAZolam, benzonatate, ipratropium-albuterol, menthol    Diet:  Current Diet Order   Procedures    Diet Order Diet: Regular (cruch meds that can be crushe in puree. float others one at a time,  up in wheel chair and in dining room for all meals)       Medical Decision Making and Plan:  Acute heart failure with preserved ejection fraction  PT and OT for mobility and ADLs. Per guidelines, 15 hours per week between PT, OT and/or SLP.  Follow-up cardiology  BNP in the a.m.  Prior to admission: Lasix 60 mg IV twice daily, lisinopril 5 mg daily, Aldactone 25 mg daily on HOLD due to hypotension from aggressive diuresis s/p gentle fluids  Hospitalist consult    Dysphagia  SLP following      Acute hypoxemic respiratory failure secondary to multifocal pneumonia seen on CT  Procalcitonin, WBC within normal limits.  Being treated with Zithromax through 4/14 for possible atypical pneumonia  RT to consult  Encourage IS  Pep therapy  DuoNeb  as needed  Continue Delsym  Continue Trelegy Ellipta  Pulm recommendation just prior to rehab admission is for short course of prednisone 40 mg daily for 5 days.  Initiating , completed .      Hypertension  Hypotension  Prior to admission: Lasix 60 mg IV twice daily, lisinopril 5 mg daily, Aldactone 25 mg daily on HOLD due to hypotension from aggressive diuresis s/p gentle fluids   VS with some aberrant elevations but mostly normal. Hospitalist following.     Leukocytosis  UA culture + Candida albicans. Start Fluconazole  Mild improvement in WBC to 11.4   suspect  steroid effect. Also being treated for Candida UTI.     Atrial fibrillation  Continue Xarelto 15 mg daily  Continue atenolol 50 mg every evening     TANIA  Secondary to aggressive diuresis  Monitor labs - renal function stable/improving   improving mildly    Renal function stable     Hypothyroidism  Continue Synthroid 50 mcg each morning     Pain  Tylenol as needed     Skin  Patient at risk for skin breakdown due to debility in areas including sacrum, achilles, elbows and head in addition to other sites. Nursing to assess skin daily.      Poor appetite  Depression  Continue mirtazapine 15 mg nightly  Continue sertraline 50 mg daily     Bowel   Patient on Senna-docusate for constipation prophylaxis.      Bladder  TV/PVR/BS PRN  Continue vibegron 75 mg daily  UA positive for few bacteria and budding yeast. Not on meds. Culture PENDING  - + Candida on Diflucan     Fungal/yeast within skin folds  Continue nystatin powder through 4/15    RLE Swelling  US Right LE - negative        Upcoming Labs/imagin/22     DVT PROPHYLAXIS: Xarelto 15 mg daily     HOSPITALIST FOLLOWING: Yes consulted on admission - d/w hospitalist      CODE STATUS: Full code     DISPO: Home to [facility] versus skilled nursing     ERLINDA: 24     ADDITIONAL MEDICAL NEEDS ON D/C (IV abx, O2, etc): TBD     MEDS SENT TO: M2B Eligible     DISCHARGE SPECIALIST FOLLOW  UP: Cardiology     Patient to scheduled follow up with their PCP within 2 weeks from discharge from the [facility].      ____________________________________    Dr. Lisa Mercedes DO, MS  [facility] - Physical Medicine & Rehabilitation   ____________________________________

## 2024-04-18 NOTE — CARE PLAN
The patient is Stable - Low risk of patient condition declining or worsening    Shift Goals  Clinical Goals: safety  Patient Goals: sleep well    Progress made toward(s) clinical / shift goals:    Problem: Fall Risk - Rehab  Goal: Patient will remain free from falls  Outcome: Progressing. Patient bed in lowest locked position with bed alarm on and call light within reach. Patient calls appropriately with call light and has remained free from falls over shift.        Problem: Risk for Aspiration  Goal: Patient's risk for aspiration will be absent or decrease  Outcome: Progressing. Patient received her scheduled medication crushed with applesauce and tolerated well. Patient shows no signs of aspiration or choking over shift.

## 2024-04-18 NOTE — THERAPY
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      Objective       04/18/24 1301   Treatment Charges   SLP Cognitive Skill Development First 15 Minutes 1   SLP Cognitive Skill Development Additional 15 Minutes 1   SLP Total Time Spent   SLP Individual Total Time Spent (Mins) 30   Precautions   Precautions Fall Risk   Written Language Expression   Dominant Hand Right   Functional Level of Assist   Comprehension Minimal Assist   Comprehension Description Glasses;Verbal cues   Memory Maximal Assist   Memory Description Bed/chair alarm;Increased time;Seat belt;Therapy schedule;Supervision;Verbal cueing   Interdisciplinary Plan of Care Collaboration   Patient Position at End of Therapy Seated;Chair Alarm On;Self Releasing Lap Belt Applied;Call Light within Reach;Tray Table within Reach;Phone within Reach   Speech Language Pathologist Assigned   Assigned SLP / Treatment Time / Comments LM/MS 30 Cog         Assessment    Patient recorded and recalled items for daily log. Patient able to recall breakfast items and OT session, however, needed mod A to recall what she had for lunch and previous PT session. RWAVS memory strategies were reviewed with patient, in which patient did not recall reviewing strategies previous session. Patient able to recall 1/2 clinician's names, and needed min A to recall second name. Patient not able to recall 5 unrelated words from previous session. Completed functional memory tasks (paragraphs with 3-4 elements) with 57% accuracy with mod A. Max A required to recall remaining items.     Strengths: Willingly participates in therapeutic activities, Pleasant and cooperative, Able to follow instructions, Effective communication skills  Barriers:  Aspiration risk, Difficulty following instructions    Plan    Target recall, attention, and problem solving.     Passport items to be completed:  Express basic needs, understand food/liquid recommendations, consistently follow swallow precautions, manage finances, manage medications, arrive to therapy appointments on time, complete daily memory log entries, solve problems related to safety situations, review education related to hospitalization, complete caregiver training     Speech Therapy Problems (Active)       Problem: Memory STGs       Dates: Start:  04/14/24         Goal: STG-Within one week, patient will implement functional memory strategies with 80% accuracy provided mod cues.        Dates: Start:  04/16/24         Goal Note filed on 04/16/24 1550 by Janessa Alvarado, Student       Patient needed max A to recall information.                  Problem: Problem Solving STGs       Dates: Start:  04/16/24         Goal: STG-Within one week, patient will complete sustained/alternating/selective attention tasks with 80% accuracy provided mod cues.       Dates: Start:  04/16/24         Goal Note filed on 04/16/24 1550 by Janessa Alvarado, Student       Patient needs mod to max A to complete problem solving and attention tasks.                  Problem: Speech/Swallowing LTGs       Dates: Start:  04/14/24         Goal: LTG-By discharge, patient will safely swallow textures/consistencies determined by MBSS to be the least restrictive with no overt s/sx of aspiration, no decline in respiratory status.       Dates: Start:  04/14/24            Goal: LTG-By discharge, patient will complete functional problem solving and recall information with 80% accuracy provided min cues.        Dates: Start:  04/16/24               Problem: Swallowing STGs       Dates: Start:  04/14/24

## 2024-04-18 NOTE — PROGRESS NOTES
NURSING DAILY NOTE    Name: Donte Magaña  Date of Admission: 4/12/2024  Admitting Diagnosis: Acute diastolic heart failure (HCC)  Attending Physician: Lisa Mercedes D.o.  Allergies: Amiodarone, Bactrim, Cipro xr, Metoprolol, Morphine, Phytoplex z-guard [petrolatum-zinc oxide], Pseudoephedrine, Qvar [beclomethasone dipropionate], Vibramycin, Atorvastatin calcium-polysorbate 80, Augmentin, Diltiazem, Flecainide, Keflex, Mucinex, Tramadol, Atorvastatin, and Tape    Safety  Patient Assist  omin  Patient Precautions  oFall Risk  Precaution Comments  oPacemaker 2 years old per pt report  Bed Transfer Status  oStandby Assist  Toilet Transfer Status  oStandby Assist  Assistive Devices  oRails, Wheelchair  Oxygen  oNasal Cannula  Diet/Therapeutic Dining  o  Current Diet Order   Procedures    Diet Order Diet: Regular (cruch meds that can be crushe in puree. float others one at a time,  up in wheel chair and in dining room for all meals)     Pill Administration  ocrushed, whole, floated, and one at a time   Agitated Behavioral Scale  o   ABS Level of Severity  o     Fall Risk  Has the patient had a fall this admission?  Corey  Renetta Chauhan Fall Risk Scoring  o22, HIGH RISK  Fall Risk Safety Measures  renetta alarm and chair alarm    Vitals  Temperature: 36.4 °C (97.5 °F)  Temp src: Temporal  Pulse: 86  Respiration: 18  Blood Pressure : (!) 144/78  Blood Pressure MAP (Calculated): 100 MM HG  BP Location: Left, Upper Arm  Patient BP Position: Supine    Oxygen  Pulse Oximetry: 98 %  O2 (LPM): 1  O2 Delivery Device: Nasal Cannula    Bowel and Bladder  Last Bowel Movement  o04/17/24  Stool Type  oType 6: Fluffy pieces with ragged edges, a mushy stool  Bowel Device  oBathroom, Diaper  Continent  oBladder:    oBowel:    Bladder Function  oUrine Void (mL):  (Large)  Number of Times Voided: 1  Urine Color: Yellow  Urine Clarity: Clear  Urine Odor: Malodorous  Number of Times Incontinent of Urine: 0  Straight Catheter: 400  ml  Genitourinary Assessment  oBladder Assessment (WDL):  WDL Except  Pruett Catheter: Not Applicable  Urine Color: Yellow  Urine Clarity: Clear  Urine Odor: Malodorous  Number of Bladder Accidents: 1  Total Number of Bladder of Accidents in Last 7 Days: 1  Number of Times Incontinent of Urine: 0  Bladder Device: Bathroom  Bladder Scan: Post Void  $ Bladder Scan Results (mL): 150    Skin  Ashutosh Score  o 18  Sensory Interventions  o Bed Types: Low Airloss  Skin Preventative Measures: Pillows in Use for Support / Positioning, Silicone Oxygen Tubing in Use, Gray Foam for Oxygen Tubing (Pad ear protector)  Moisture Interventions  oMoisturizers/Barriers: Barrier Wipes      Pain  Pain Rating Scale  o0 - No Pain  Pain Location  o   Pain Location Orientation  o   Pain Interventions  oDeclines    ADLs    Bathing  oShower, * * With Assistance from, Staff  Linen Change  oComplete  Personal Hygiene  oMoist Edie Wipes, Change Edie Pads, Perineal Care, Edie Bottle  Chlorhexidine Bath  o   Oral Care  oBrushed Teeth  Teeth/Dentures  o   Shave  o   Nutrition Percentage Eaten  oLunch, Between 25-50% Consumed  Environmental Precautions  oTreaded Slipper Socks on Patient, Bed in Low Position  Patient Turns/Positioning  oPatient Turns Self from Side to Side  Patient Turns Assistance/Tolerance  o   Bed Positions  Zach Controls On  Head of Bed Elevated  oSelf regulated      Psychosocial/Neurologic Assessment  Psychosocial Assessment  oPsychosocial (WDL):  Within Defined Limits  Patient Behaviors: Fatigue, Forgetful  Neurologic Assessment  oNeuro (WDL): Exceptions to WDL  Level of Consciousness: Alert  Orientation Level: Oriented to person, Oriented to situation, Disoriented to place, Disoriented to time  Cognition: Confused in conversation, Follows commands  Speech: Delayed responses, Clear  Pupil Assesment: No  R Pupil Size (mm): 3  R Pupil Shape / Description: Round  R Pupil Reaction: Brisk  L Pupil Size (mm): 3  L Pupil Shape /  Description: Round  L Pupil Reaction: Brisk  oEENT (WDL):  WDL Except    Cardio/Pulmonary Assessment  Edema  o   Respiratory Breath Sounds  oRUL Breath Sounds: Clear  RML Breath Sounds: Clear  RLL Breath Sounds: Diminished  DO Breath Sounds: Clear  LLL Breath Sounds: Clear, Diminished  Cardiac Assessment  oCardiac (WDL):  WDL Except

## 2024-04-18 NOTE — THERAPY
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  When asked if she needed to use the restroom prior to leaving the room, she stated, \"I guess it wouldn't hurt to try.\"     Objective       04/18/24 1031   OT Charge Group   OT Self Care / ADL (Units) 2   OT Therapy Activity (Units) 2   OT Total Time Spent   OT Individual Total Time Spent (Mins) 60   Precautions   Precautions Fall Risk   Vitals   O2 (LPM) 1   O2 Delivery Device Nasal Cannula   Functional Level of Assist   Grooming Supervision;Seated   Grooming Description   (to brush hair/teeth, wash/dry hands/face)   Toileting Supervision   Toileting Description Grab bar;Set-up of equipment;Supervision for safety   Toilet Transfers Supervised   Toilet Transfer Description Grab bar;Set-up of equipment;Supervision for safety;Verbal cueing   Interdisciplinary Plan of Care Collaboration   IDT Collaboration with  Physician   Patient Position at End of Therapy Seated;Chair Alarm On  (in dining room for lunch)   Collaboration Comments Dr. Mercedes rounded with patient     Standing tolerance at tall table x 4 and 11 minutes while working on cognitive leisure activity (jumbo piece puzzle) with SBA/supervision.     Assessment    Patient required min/mod assist/cues to complete puzzle with 100% accuracy, but was able to tolerate standing for 4 and 11 minutes.    Strengths: Able to follow instructions, Independent prior level of function, Motivated for self care and independence, Pleasant and cooperative, Willingly participates in therapeutic activities  Barriers: Decreased endurance, Fatigue, Generalized weakness, Hypotension, Impaired activity tolerance, Impaired balance, Impaired carryover of learning, " Impaired insight/denial of deficits, Impaired functional cognition, Limited mobility    Plan    ADLs, transfers with FWW, strength/endurance building, standing tolerance/balance     Occupational Therapy Goals (Active)       Problem: Dressing       Dates: Start:  04/17/24         Goal: STG-Within one week, patient will dress LB with supervision/mod I       Dates: Start:  04/17/24               Problem: Functional Transfers       Dates: Start:  04/17/24         Goal: STG-Within one week, patient will transfer to toilet with supervision/mod I       Dates: Start:  04/17/24               Problem: OT Long Term Goals       Dates: Start:  04/13/24         Goal: LTG-By discharge, patient will complete basic self care tasks at SPV-Mod I level using DME/AE PRN.       Dates: Start:  04/13/24            Goal: LTG-By discharge, patient will perform bathroom transfers at SPV-Mod I level using DME/AE PRN.       Dates: Start:  04/13/24               Problem: Toileting       Dates: Start:  04/17/24         Goal: STG-Within one week, patient will complete toileting tasks with supervision/mod I       Dates: Start:  04/17/24

## 2024-04-19 ENCOUNTER — APPOINTMENT (OUTPATIENT)
Dept: SPEECH THERAPY | Facility: REHABILITATION | Age: 84
DRG: 291 | End: 2024-04-19
Attending: GENERAL PRACTICE
Payer: MEDICARE

## 2024-04-19 ENCOUNTER — APPOINTMENT (OUTPATIENT)
Dept: OCCUPATIONAL THERAPY | Facility: REHABILITATION | Age: 84
DRG: 291 | End: 2024-04-19
Attending: PHYSICAL MEDICINE & REHABILITATION
Payer: MEDICARE

## 2024-04-19 ENCOUNTER — APPOINTMENT (OUTPATIENT)
Dept: PHYSICAL THERAPY | Facility: REHABILITATION | Age: 84
DRG: 291 | End: 2024-04-19
Attending: PHYSICAL MEDICINE & REHABILITATION
Payer: MEDICARE

## 2024-04-19 ENCOUNTER — APPOINTMENT (OUTPATIENT)
Dept: INPATIENT REHAB | Facility: REHABILITATION | Age: 84
DRG: 291 | End: 2024-04-19
Payer: MEDICARE

## 2024-04-19 ENCOUNTER — HOME HEALTH ADMISSION (OUTPATIENT)
Dept: HOME HEALTH SERVICES | Facility: HOME HEALTHCARE | Age: 84
End: 2024-04-19
Payer: MEDICARE

## 2024-04-19 PROCEDURE — 97112 NEUROMUSCULAR REEDUCATION: CPT | Mod: CQ

## 2024-04-19 PROCEDURE — 770010 HCHG ROOM/CARE - REHAB SEMI PRIVAT*

## 2024-04-19 PROCEDURE — 97530 THERAPEUTIC ACTIVITIES: CPT | Mod: CQ

## 2024-04-19 PROCEDURE — 97116 GAIT TRAINING THERAPY: CPT | Mod: CQ

## 2024-04-19 PROCEDURE — 700102 HCHG RX REV CODE 250 W/ 637 OVERRIDE(OP): Performed by: GENERAL PRACTICE

## 2024-04-19 PROCEDURE — A9270 NON-COVERED ITEM OR SERVICE: HCPCS | Performed by: HOSPITALIST

## 2024-04-19 PROCEDURE — 700102 HCHG RX REV CODE 250 W/ 637 OVERRIDE(OP): Performed by: HOSPITALIST

## 2024-04-19 PROCEDURE — 99232 SBSQ HOSP IP/OBS MODERATE 35: CPT | Performed by: PHYSICAL MEDICINE & REHABILITATION

## 2024-04-19 PROCEDURE — 97129 THER IVNTJ 1ST 15 MIN: CPT

## 2024-04-19 PROCEDURE — 94760 N-INVAS EAR/PLS OXIMETRY 1: CPT

## 2024-04-19 PROCEDURE — A9270 NON-COVERED ITEM OR SERVICE: HCPCS | Performed by: PHYSICAL MEDICINE & REHABILITATION

## 2024-04-19 PROCEDURE — A9270 NON-COVERED ITEM OR SERVICE: HCPCS | Performed by: GENERAL PRACTICE

## 2024-04-19 PROCEDURE — 700102 HCHG RX REV CODE 250 W/ 637 OVERRIDE(OP): Performed by: PHYSICAL MEDICINE & REHABILITATION

## 2024-04-19 PROCEDURE — 94640 AIRWAY INHALATION TREATMENT: CPT

## 2024-04-19 PROCEDURE — 97130 THER IVNTJ EA ADDL 15 MIN: CPT

## 2024-04-19 PROCEDURE — 99232 SBSQ HOSP IP/OBS MODERATE 35: CPT | Performed by: HOSPITALIST

## 2024-04-19 PROCEDURE — 97535 SELF CARE MNGMENT TRAINING: CPT

## 2024-04-19 PROCEDURE — 97110 THERAPEUTIC EXERCISES: CPT | Mod: CQ

## 2024-04-19 RX ADMIN — VIBEGRON 75 MG: 75 TABLET, FILM COATED ORAL at 09:00

## 2024-04-19 RX ADMIN — FLUCONAZOLE 100 MG: 100 TABLET ORAL at 09:17

## 2024-04-19 RX ADMIN — LEVOTHYROXINE SODIUM 37.5 MCG: 0.07 TABLET ORAL at 05:53

## 2024-04-19 RX ADMIN — MIRTAZAPINE 15 MG: 15 TABLET, ORALLY DISINTEGRATING ORAL at 20:47

## 2024-04-19 RX ADMIN — POLYETHYLENE GLYCOL 3350 1 PACKET: 17 POWDER, FOR SOLUTION ORAL at 09:15

## 2024-04-19 RX ADMIN — MONTELUKAST 10 MG: 10 TABLET, FILM COATED ORAL at 20:47

## 2024-04-19 RX ADMIN — SERTRALINE HYDROCHLORIDE 50 MG: 50 TABLET ORAL at 09:17

## 2024-04-19 RX ADMIN — FLUTICASONE FUROATE, UMECLIDINIUM BROMIDE AND VILANTEROL TRIFENATATE 1 PUFF: 100; 62.5; 25 POWDER RESPIRATORY (INHALATION) at 06:54

## 2024-04-19 RX ADMIN — SENNOSIDES AND DOCUSATE SODIUM 1 TABLET: 50; 8.6 TABLET ORAL at 09:18

## 2024-04-19 RX ADMIN — RIVAROXABAN 20 MG: 20 TABLET, FILM COATED ORAL at 18:16

## 2024-04-19 RX ADMIN — SENNOSIDES AND DOCUSATE SODIUM 1 TABLET: 50; 8.6 TABLET ORAL at 20:47

## 2024-04-19 RX ADMIN — ATENOLOL 50 MG: 25 TABLET ORAL at 20:47

## 2024-04-19 ASSESSMENT — ENCOUNTER SYMPTOMS
CHILLS: 0
NERVOUS/ANXIOUS: 0
SHORTNESS OF BREATH: 0
ABDOMINAL PAIN: 0
DIARRHEA: 0
FEVER: 0
NAUSEA: 0
VOMITING: 0

## 2024-04-19 ASSESSMENT — ACTIVITIES OF DAILY LIVING (ADL)
BED_CHAIR_WHEELCHAIR_TRANSFER_DESCRIPTION: SET-UP OF EQUIPMENT;SUPERVISION FOR SAFETY
TOILET_TRANSFER_DESCRIPTION: GRAB BAR;SET-UP OF EQUIPMENT;SUPERVISION FOR SAFETY
TOILET_TRANSFER_DESCRIPTION: GRAB BAR;INITIAL PREPARATION FOR TASK;SET-UP OF EQUIPMENT;SUPERVISION FOR SAFETY
BED_CHAIR_WHEELCHAIR_TRANSFER_DESCRIPTION: SET-UP OF EQUIPMENT;SUPERVISION FOR SAFETY
TOILET_TRANSFER_DESCRIPTION: GRAB BAR;SUPERVISION FOR SAFETY;SET-UP OF EQUIPMENT
TOILETING_LEVEL_OF_ASSIST_DESCRIPTION: SUPERVISION FOR SAFETY
BED_CHAIR_WHEELCHAIR_TRANSFER_DESCRIPTION: ADAPTIVE EQUIPMENT;INCREASED TIME;INITIAL PREPARATION FOR TASK;SUPERVISION FOR SAFETY;VERBAL CUEING
TUB_SHOWER_TRANSFER_DESCRIPTION: GRAB BAR;SHOWER BENCH;SET-UP OF EQUIPMENT;SUPERVISION FOR SAFETY

## 2024-04-19 ASSESSMENT — GAIT ASSESSMENTS
DISTANCE (FEET): 50
GAIT LEVEL OF ASSIST: MINIMAL ASSIST
ASSISTIVE DEVICE: SINGLE POINT CANE

## 2024-04-19 ASSESSMENT — PAIN DESCRIPTION - PAIN TYPE
TYPE: ACUTE PAIN
TYPE: ACUTE PAIN

## 2024-04-19 NOTE — DISCHARGE PLANNING
ATTN: Case Management  RE: Referral for Home Health    As of 4/19/2024, we have accepted the Home Health referral for the patient listed above.    A [facility] clinician will be out to see the patient within 48 hours. If you have any questions or concerns regarding the patient’s transition to Home Health, please do not hesitate to contact us at [phone].      We look forward to collaborating with you,  [facility] Team

## 2024-04-19 NOTE — CARE PLAN
"The patient is Stable - Low risk of patient condition declining or worsening    Shift Goals  Clinical Goals: safety  Patient Goals: sleep well    Problem: Skin Integrity  Goal: Skin integrity is maintained or improved  Outcome: Progressing  Note:   Ashutosh Score: 18    Patient's skin remains intact and free from new or accidental injury this shift; no s/s of infection. RN wound protocol checked. Encouraged hydration and educated about the importance of nutrition to keep skin integrity. Will continue to monitor.      Problem: Fall Risk - Rehab  Goal: Patient will remain free from falls  Outcome: Progressing  Note: Renetta Chauhan Fall risk Assessment Score: 21    High fall risk Interventions   - Alarming seatbelt  - Wander guard  - Bed and strip alarm   - Yellow sign by the door   - Yellow wrist band \"Fall risk\"  - Room near to the nurse station  - Do not leave patient unattended in the bathroom  - Fall risk education provided     "

## 2024-04-19 NOTE — PROGRESS NOTES
NURSING DAILY NOTE    Name: Donte Magaña   Date of Admission: 4/12/2024   Admitting Diagnosis: Acute diastolic heart failure (HCC)  Attending Physician: Lisa Mercedes D.o.  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 float others one at a time,  up in wheel chair and in dining room for all meals)     Pill Administration  floated and one at a time in pudding  Agitated Behavioral Scale     ABS Level of Severity       Fall Risk  Has the patient had a fall this admission?   No  Renetta Chauhan Fall Risk Scoring  21, HIGH RISK  Fall Risk Safety Measures  bed alarm and chair alarm    Vitals  Temperature: 36.9 °C (98.5 °F)  Temp src: Temporal  Pulse: 80  Respiration: 17  Blood Pressure : 127/59  Blood Pressure MAP (Calculated): 82 MM HG  BP Location: Left, Upper Arm  Patient BP Position: Supine     Oxygen  Pulse Oximetry: 95 %  O2 (LPM): 1  O2 Delivery Device: Nasal Cannula    Bowel and Bladder  Last Bowel Movement  04/19/24  Stool Type  Type 6: Fluffy pieces with ragged edges, a mushy stool  Bowel Device  Bathroom, Diaper  Continent  Bladder:     Bowel:    Bladder Function  Urine Void (mL):  (Large)  Number of Times Voided: 1  Urine Color: Yellow  Urine Clarity: Clear  Urine Odor: Malodorous  Number of Times Incontinent of Urine:  0  Straight Catheter: 400 ml  Genitourinary Assessment   Bladder Assessment (WDL):  WDL Except  Pruett Catheter: Not Applicable  Urine Color: Yellow  Urine Clarity: Clear  Urine Odor: Malodorous  Number of Bladder Accidents: 1  Total Number of Bladder of Accidents in Last 7 Days: 3  Number of Times Incontinent of Urine: 0  Bladder Device: Bathroom  Bladder Scan: Post Void  $ Bladder Scan Results (mL): 150    Skin  Ashutosh Score   18  Sensory Interventions   Bed Types: Low Airloss  Skin Preventative Measures: Pillows in Use for Support / Positioning  Moisture Interventions  Moisturizers/Barriers: Barrier Wipes ([place])      Pain  Pain Rating Scale  0 - No Pain  Pain Location     Pain Location Orientation     Pain Interventions   Declines    ADLs    Bathing   Shower, * * With Assistance from, Staff  Linen Change   Complete  Personal Hygiene  Moist Edie Wipes, Change Edie Pads, Perineal Care, Edie Bottle  Chlorhexidine Bath      Oral Care  Brushed Teeth  Teeth/Dentures     Shave     Nutrition Percentage Eaten  *  * Meal *  *, Between % Consumed  Environmental Precautions  Treaded Slipper Socks on Patient  Patient Turns/Positioning  Patient Turns Self from Side to Side  Patient Turns Assistance/Tolerance     Bed Positions  Bed Controls On  Head of Bed Elevated  Self regulated      Psychosocial/Neurologic Assessment  Psychosocial Assessment  Psychosocial (WDL):  Within Defined Limits  Patient Behaviors: Fatigue, Forgetful  Neurologic Assessment  Neuro (WDL): Exceptions to WDL  Level of Consciousness: Alert  Orientation Level: Oriented to person, Oriented to situation, Disoriented to place, Disoriented to time  Cognition: Confused in conversation, Follows commands  Speech: Delayed responses, Clear  Pupil Assesment: No  R Pupil Size (mm): 3  R Pupil Shape / Description: Round  R Pupil Reaction: Brisk  L Pupil Size (mm): 3  L Pupil Shape / Description: Round  L Pupil Reaction: Brisk  EENT (WDL):  WDL  Except    Cardio/Pulmonary Assessment  Edema      Respiratory Breath Sounds  RUL Breath Sounds: Clear  RML Breath Sounds: Clear  RLL Breath Sounds: Diminished  DO Breath Sounds: Clear  LLL Breath Sounds: Diminished  Cardiac Assessment   Cardiac (WDL):  WDL Except

## 2024-04-19 NOTE — PROGRESS NOTES
NURSING DAILY NOTE    Name: Donte Magaña   Date of Admission: 4/12/2024   Admitting Diagnosis: Acute diastolic heart failure (HCC)  Attending Physician: Lisa Mercedes D.o.  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 float others one at a time,  up in wheel chair and in dining room for all meals)     Pill Administration  crushed, floated, and one at a time   Agitated Behavioral Scale     ABS Level of Severity       Fall Risk  Has the patient had a fall this admission?   No  Renetta Chauhan Fall Risk Scoring  21, HIGH RISK  Fall Risk Safety Measures  bed alarm and chair alarm    Vitals  Temperature: 36.6 °C (97.8 °F)  Temp src: Oral  Pulse: 80  Respiration: 18  Blood Pressure : 116/55  Blood Pressure MAP (Calculated): 75 MM HG  BP Location: Left, Upper Arm  Patient BP Position: Supine     Oxygen  Pulse Oximetry: 90 %  O2 (LPM): 1  O2 Delivery Device: Nasal Cannula    Bowel and Bladder  Last Bowel Movement  04/18/24  Stool Type  Type 6: Fluffy pieces with ragged edges, a mushy stool  Bowel Device  Bathroom, Diaper  Continent  Bladder:     Bowel:    Bladder Function  Urine Void (mL):  (Large)  Number of Times Voided: 1  Urine Color: Yellow  Urine Clarity: Clear  Urine Odor: Malodorous  Number of Times Incontinent of Urine: 0  Straight  Catheter: 400 ml  Genitourinary Assessment   Bladder Assessment (WDL):  WDL Except  Pruett Catheter: Not Applicable  Urine Color: Yellow  Urine Clarity: Clear  Urine Odor: Malodorous  Number of Bladder Accidents: 1  Total Number of Bladder of Accidents in Last 7 Days: 2  Number of Times Incontinent of Urine: 0  Bladder Device: Bathroom  Bladder Scan: Post Void  $ Bladder Scan Results (mL): 150    Skin  Ashutosh Score   18  Sensory Interventions   Bed Types: Low Airloss  Skin Preventative Measures: Pillows in Use for Support / Positioning, Gray Foam for Oxygen Tubing (Pad ear protector), Silicone Oxygen Tubing in Use  Moisture Interventions  Moisturizers/Barriers: Barrier Wipes      Pain  Pain Rating Scale  0 - No Pain  Pain Location     Pain Location Orientation     Pain Interventions   Declines    ADLs    Bathing   Shower, * * With Assistance from, Staff  Linen Change   Complete  Personal Hygiene  Moist Edie Wipes, Change Edie Pads, Perineal Care, Edie Bottle  Chlorhexidine Bath      Oral Care  Brushed Teeth  Teeth/Dentures     Shave     Nutrition Percentage Eaten  *  * Meal *  *, Between % Consumed  Environmental Precautions  Treaded Slipper Socks on Patient, Bed in Low Position  Patient Turns/Positioning  Patient Turns Self from Side to Side  Patient Turns Assistance/Tolerance     Bed Positions  Bed Controls On  Head of Bed Elevated  Self regulated      Psychosocial/Neurologic Assessment  Psychosocial Assessment  Psychosocial (WDL):  Within Defined Limits  Patient Behaviors: Fatigue, Forgetful  Neurologic Assessment  Neuro (WDL): Exceptions to WDL  Level of Consciousness: Alert  Orientation Level: Oriented to person, Oriented to situation, Disoriented to place, Disoriented to time  Cognition: Confused in conversation, Follows commands  Speech: Delayed responses, Clear  Pupil Assesment: No  R Pupil Size (mm): 3  R Pupil Shape / Description: Round  R Pupil Reaction: Brisk  L Pupil Size (mm): 3  L Pupil Shape /  Description: Round  L Pupil Reaction: Brisk  EENT (WDL):  WDL Except    Cardio/Pulmonary Assessment  Edema      Respiratory Breath Sounds  RUL Breath Sounds: Clear  RML Breath Sounds: Clear  RLL Breath Sounds: Diminished  DO Breath Sounds: Clear  LLL Breath Sounds: Diminished  Cardiac Assessment   Cardiac (WDL):  WDL Except

## 2024-04-19 NOTE — THERAPY
Occupational Therapy  Daily Treatment     Patient Name: Donte Magaña  Age:  83 y.o., Sex:  female  Medical Record #: [id number]  Today's Date: 4/19/2024     Precautions  Precautions: (P) Fall Risk  Comments: Pacemaker 2 years old per pt report         Subjective    Patient was eating breakfast in bed upon OT arrival.  After finishing, she was agreeable to get out of bed, toilet and complete grooming.       Objective       04/19/24 0831   OT Charge Group   OT Self Care / ADL (Units) 2   OT Total Time Spent   OT Individual Total Time Spent (Mins) 30   Precautions   Precautions Fall Risk   Vitals   O2 (LPM) 1   O2 Delivery Device Nasal Cannula   Functional Level of Assist   Eating Independent   Grooming Independent;Seated  (to brush teeth, brush hair and wash/dry hands and face)   Toileting Supervision   Toileting Description Supervision for safety;Set-up of equipment;Grab bar;Verbal cueing  (patient required verbal cue to pull pants up after toileting)   Bed, Chair, Wheelchair Transfer Standby Assist   Bed Chair Wheelchair Transfer Description Set-up of equipment;Supervision for safety  (SPT bed to w/c)   Toilet Transfers Supervised   Toilet Transfer Description Grab bar;Supervision for safety;Set-up of equipment   Bed Mobility    Supine to Sit Supervised   Scooting Supervised   Interdisciplinary Plan of Care Collaboration   Patient Position at End of Therapy Seated;Chair Alarm On;Self Releasing Lap Belt Applied;Call Light within Reach;Tray Table within Reach;Phone within Reach         Assessment    Patient completed toileting, bed to chair, toileting transfer and grooming with supervision. Once done toileting, she completed hygiene and transferred back to w/c without pulling up her pants, which she had forgotten to do.  Strengths: Able to follow instructions, Independent prior level of function, Motivated for self care and independence, Pleasant and cooperative, Willingly participates in therapeutic  activities  Barriers: Decreased endurance, Fatigue, Generalized weakness, Hypotension, Impaired activity tolerance, Impaired balance, Impaired carryover of learning, Impaired insight/denial of deficits, Impaired functional cognition, Limited mobility    Plan    ADLs, transfers with FWW, strength/endurance building, standing tolerance/balance     Occupational Therapy Goals (Active)       Problem: Dressing       Dates: Start:  04/17/24         Goal: STG-Within one week, patient will dress LB with supervision/mod I       Dates: Start:  04/17/24    Expected End:  04/23/24               Problem: Functional Transfers       Dates: Start:  04/17/24         Goal: STG-Within one week, patient will transfer to toilet with supervision/mod I       Dates: Start:  04/17/24    Expected End:  04/23/24               Problem: OT Long Term Goals       Dates: Start:  04/13/24         Goal: LTG-By discharge, patient will complete basic self care tasks at SPV-Mod I level using DME/AE PRN.       Dates: Start:  04/13/24    Expected End:  04/23/24            Goal: LTG-By discharge, patient will perform bathroom transfers at SPV-Mod I level using DME/AE PRN.       Dates: Start:  04/13/24    Expected End:  04/23/24               Problem: Toileting       Dates: Start:  04/17/24         Goal: STG-Within one week, patient will complete toileting tasks with supervision/mod I       Dates: Start:  04/17/24    Expected End:  04/23/24

## 2024-04-19 NOTE — THERAPY
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     Objective       04/19/24 1031   PT Charge Group   PT Gait Training (Units) 1   PT Therapeutic Exercise (Units) 1   PT Neuromuscular Re-Education / Balance (Units) 1   PT Therapeutic Activities (Units) 1   Supervising Physical Therapist Mayelin Jaimes   PT Total Time Spent   PT Individual Total Time Spent (Mins) 60   Vitals   Pulse (!) 114   Patient BP Position   (during exercise)   Pulse Oximetry 93 %   O2 (LPM) 1   O2 Delivery Device Nasal Cannula   Cognition    Level of Consciousness Alert   Gait Functional Level of Assist    Gait Level Of Assist Minimal Assist  (w/c follow for safety)   Assistive Device Single Point Cane   Distance (Feet) 50   # of Times Distance was Traveled 1   Transfer Functional Level of Assist   Bed, Chair, Wheelchair Transfer Standby Assist   Bed Chair Wheelchair Transfer Description Adaptive equipment;Increased time;Initial preparation for task;Supervision for safety;Verbal cueing   Toilet Transfers Supervised   Toilet Transfer Description Grab bar;Initial preparation for task;Set-up of equipment;Supervision for safety   Sitting Lower Body Exercises   Nustep Resistance Level 4  (BUE+BLE for 4 mins, terminated d/t discomfort and out of breath)   Bed Mobility    Sit to Stand Supervised   Neuro-Muscular Treatments   Neuro-Muscular Treatments Anterior weight shift   Comments standing tolerance/balance during standing ladder ball w/ LUE support on FWW, RUE tossing. completed 3 mins of standing x2 bout.   Interdisciplinary Plan of Care Collaboration   Patient Position at End of Therapy Seated  (in cafeteria)         Assessment    Pt tolerated session well, she was able to trial ambulating w/  SPC which she stated she uses it sometimes at [facility]. PTA is not recommending SPC as AD for pt's forward leaning and shuffling. Pt's main barrier remains to be low endurance.      Strengths: Able to follow instructions, Motivated for self care and independence, Pleasant and cooperative, Willingly participates in therapeutic activities ([place]- elevator access)  Barriers: Decreased endurance, Fatigue, Generalized weakness, Impaired activity tolerance, Impaired balance, Limited mobility (PLOF- primary scooter user)    Plan    Ambulation w/ FWW  Endurance  BLE strengthening     DME  PT DME Recommendations  Additional Equipment:  (Non aticipated; pt has FWW, SPC, scooter)     Passport items to be completed:  Get in/out of bed safely, in/out of a vehicle, safely use mobility device, walk or wheel around home/community, navigate up and down stairs, show how to get up/down from the ground, ensure home is accessible, demonstrate HEP, complete caregiver training       Physical Therapy Problems (Active)       Problem: PT-Long Term Goals       Dates: Start:  04/13/24         Goal: LTG-By discharge, patient will propel wheelchair/ scooter mod I >150 ft over various surfaces       Dates: Start:  04/13/24    Expected End:  04/23/24            Goal: LTG-By discharge, patient will ambulate with FWW 50 ft x 2 indoors SPV-mod I        Dates: Start:  04/13/24    Expected End:  04/23/24            Goal: LTG-By discharge, patient will transfer one surface to another mod I with FWW       Dates: Start:  04/13/24    Expected End:  04/23/24

## 2024-04-19 NOTE — CARE PLAN
Problem: Knowledge Deficit - Standard  Goal: Patient and family/care givers will demonstrate understanding of plan of care, disease process/condition, diagnostic tests and medications  Outcome: Progressing   Pt education given regarding plan of care with emphasis on adequate hydration, pt shows poor understanding, will continue to reinforce education and continue to monitor.         Problem: Fall Risk - Rehab  Goal: Patient will remain free from falls  Outcome: Progressing   Pt education given regarding fall precautions AND safety measures, pt shows no understanding, has  attempted to self transfer this shift, will continue to reinforce education and continue to monitor.

## 2024-04-19 NOTE — PROGRESS NOTES
"  Physical Medicine & Rehabilitation Progress Note    Encounter Date: 4/19/2024    Chief Complaint: Doing ok    Interval Events (Subjective):  VS: BP stable. 1-1.5 supp O2    Patient seen and examined in her room. Has no specific complaints. Is not having SOB. She states she did choke on her water a little earlier, but other than that has been doing fine.     ROS: 14 point ROS negative unless otherwise specified in the HPI    Objective:  VITAL SIGNS: /59   Pulse 80   Temp 36.9 °C (98.5 °F) (Temporal)   Resp 16   Ht 1.626 m (5' 4\")   Wt 77.1 kg (170 lb 1 oz)   LMP 01/10/1975   SpO2 96%   BMI 29.19 kg/m²     GEN: No apparent distress  HEENT: Head normocephalic, atraumatic.  Sclera nonicteric bilaterally, no ocular discharge appreciated bilaterally.  CV: Extremities warm and well-perfused, RLE > LLE edema  PULMONARY: Breathing nonlabored on supp O2  ABD: Soft, nontender.  SKIN: No appreciable skin breakdown on exposed areas of skin.  PSYCH: Mood and affect within normal limits.  NEURO: Awake alert.  Conversational.  Logical thought content.      Laboratory Values:  Recent Results (from the past 72 hour(s))   CBC WITH DIFFERENTIAL    Collection Time: 04/18/24  5:29 AM   Result Value Ref Range    WBC 14.7 (H) 4.8 - 10.8 K/uL    RBC 4.17 (L) 4.20 - 5.40 M/uL    Hemoglobin 13.5 12.0 - 16.0 g/dL    Hematocrit 41.8 37.0 - 47.0 %    .2 (H) 81.4 - 97.8 fL    MCH 32.4 27.0 - 33.0 pg    MCHC 32.3 32.2 - 35.5 g/dL    RDW 48.4 35.9 - 50.0 fL    Platelet Count 402 164 - 446 K/uL    MPV 10.0 9.0 - 12.9 fL    Neutrophils-Polys 69.80 44.00 - 72.00 %    Lymphocytes 20.80 (L) 22.00 - 41.00 %    Monocytes 6.70 0.00 - 13.40 %    Eosinophils 0.60 0.00 - 6.90 %    Basophils 0.30 0.00 - 1.80 %    Immature Granulocytes 1.80 (H) 0.00 - 0.90 %    Nucleated RBC 0.00 0.00 - 0.20 /100 WBC    Neutrophils (Absolute) 10.23 (H) 1.82 - 7.42 K/uL    Lymphs (Absolute) 3.05 1.00 - 4.80 K/uL    Monos (Absolute) 0.98 (H) 0.00 - 0.85 " K/uL    Eos (Absolute) 0.09 0.00 - 0.51 K/uL    Baso (Absolute) 0.04 0.00 - 0.12 K/uL    Immature Granulocytes (abs) 0.27 (H) 0.00 - 0.11 K/uL    NRBC (Absolute) 0.00 K/uL   Basic Metabolic Panel    Collection Time: 04/18/24  5:29 AM   Result Value Ref Range    Sodium 141 135 - 145 mmol/L    Potassium 5.1 3.6 - 5.5 mmol/L    Chloride 98 96 - 112 mmol/L    Co2 32 20 - 33 mmol/L    Glucose 145 (H) 65 - 99 mg/dL    Bun 38 (H) 8 - 22 mg/dL    Creatinine 0.75 0.50 - 1.40 mg/dL    Calcium 9.9 8.5 - 10.5 mg/dL    Anion Gap 11.0 7.0 - 16.0   ESTIMATED GFR    Collection Time: 04/18/24  5:29 AM   Result Value Ref Range    GFR (CKD-EPI) 78 >60 mL/min/1.73 m 2       Medications:  Scheduled Medications   Medication Dose Frequency    rivaroxaban  20 mg PM MEAL    fluconazole  100 mg DAILY    senna-docusate  1 Tablet BID    polyethylene glycol/lytes  1 Packet DAILY    levothyroxine  37.5 mcg AM ES    montelukast  10 mg Nightly    Pharmacy Consult Request  1 Each PHARMACY TO DOSE    atenolol  50 mg Q EVENING    fluticasone-umeclidinium-vilanterol  1 Puff QDAILY (RT)    mirtazapine  15 mg QHS    sertraline  50 mg DAILY    vibegron  75 mg DAILY     PRN medications: bisacodyl, Respiratory Therapy Consult, hydrALAZINE, carboxymethylcellulose, benzocaine-menthol, traZODone, sodium chloride, acetaminophen, ALPRAZolam, benzonatate, ipratropium-albuterol, menthol    Diet:  Current Diet Order   Procedures    Diet Order Diet: Regular (cruch meds that can be crushe in puree. float others one at a time,  up in wheel chair and in dining room for all meals)       Medical Decision Making and Plan:  Acute heart failure with preserved ejection fraction  PT and OT for mobility and ADLs. Per guidelines, 15 hours per week between PT, OT and/or SLP.  Follow-up cardiology  BNP in the a.m.  Prior to admission: Lasix 60 mg IV twice daily, lisinopril 5 mg daily, Aldactone 25 mg daily on HOLD due to hypotension from aggressive diuresis s/p gentle  fluids  Hospitalist consult    Dysphagia  SLP following      Acute hypoxemic respiratory failure secondary to multifocal pneumonia seen on CT  Procalcitonin, WBC within normal limits.  Being treated with Zithromax through  for possible atypical pneumonia  RT to consult  Encourage IS  Pep therapy  DuoNeb as needed  Continue Delsym  Continue Trelegy Ellipta  Pulm recommendation just prior to rehab admission is for short course of prednisone 40 mg daily for 5 days.  Initiating , completed .      Hypertension  Hypotension  Prior to admission: Lasix 60 mg IV twice daily, lisinopril 5 mg daily, Aldactone 25 mg daily on HOLD due to hypotension from aggressive diuresis s/p gentle fluids   VS with some aberrant elevations but mostly normal. Hospitalist following.     Leukocytosis  UA culture + Candida albicans. Start Fluconazole  Mild improvement in WBC to 11.4   suspect  steroid effect. Also being treated for Candida UTI.     Atrial fibrillation  Continue Xarelto 15 mg daily  Continue atenolol 50 mg every evening     TANIA  Secondary to aggressive diuresis  Monitor labs - renal function stable/improving   improving mildly    Renal function stable     Hypothyroidism  Continue Synthroid 50 mcg each morning     Pain  Tylenol as needed     Skin  Patient at risk for skin breakdown due to debility in areas including sacrum, achilles, elbows and head in addition to other sites. Nursing to assess skin daily.      Poor appetite  Depression  Continue mirtazapine 15 mg nightly  Continue sertraline 50 mg daily     Bowel   Patient on Senna-docusate for constipation prophylaxis.      Bladder  TV/PVR/BS PRN  Continue vibegron 75 mg daily  UA positive for few bacteria and budding yeast. Not on meds. Culture PENDING  - + Candida on Diflucan     Fungal/yeast within skin folds  Continue nystatin powder through 4/15    RLE Swelling  US Right LE - negative        Upcoming Labs/imagin/22     DVT  PROPHYLAXIS: Xarelto 15 mg daily     HOSPITALIST FOLLOWING: Yes consulted on admission - d/w hospitalist 4/19     CODE STATUS: Full code     DISPO: Home to ILF versus USP     ERLINDA: 4/23/24     ADDITIONAL MEDICAL NEEDS ON D/C (IV abx, O2, etc): TBD     MEDS SENT TO: M2B Eligible     DISCHARGE SPECIALIST FOLLOW UP: Cardiology     Patient to scheduled follow up with their PCP within 2 weeks from discharge from the [facility].      ____________________________________    Dr. Lisa Mercedes DO, MS  [facility] - Physical Medicine & Rehabilitation   ____________________________________

## 2024-04-19 NOTE — FLOWSHEET NOTE
04/19/24 0653   Events/Summary/Plan   Events/Summary/Plan mdi   Vital Signs   Pulse 80   Respiration 16   Pulse Oximetry 96 %   $ Pulse Oximetry (Spot Check) Yes   Respiratory Assessment   Level of Consciousness Alert   Chest Exam   Work Of Breathing / Effort Within Normal Limits   Breath Sounds   RUL Breath Sounds Clear   RML Breath Sounds Clear   RLL Breath Sounds Diminished   DO Breath Sounds Clear   LLL Breath Sounds Diminished   Oxygen   O2 (LPM) 1.5   O2 Delivery Device Silicone Nasal Cannula

## 2024-04-19 NOTE — PROGRESS NOTES
Hospital Medicine Daily Progress Note        Chief Complaint  Hypertension  Afib  Leukocytosis    Interval Problem Update  No significant events overnight.    Review of Systems  Review of Systems   Constitutional:  Negative for chills and fever.   Respiratory:  Negative for shortness of breath.    Cardiovascular:  Negative for chest pain.   Gastrointestinal:  Negative for abdominal pain, diarrhea, nausea and vomiting.   Psychiatric/Behavioral:  The patient is not nervous/anxious.         Physical Exam  Temp:  [36.6 °C (97.8 °F)-36.9 °C (98.5 °F)] 36.9 °C (98.5 °F)  Pulse:  [80] 80  Resp:  [16-18] 16  BP: (116-131)/(55-61) 127/59  SpO2:  [90 %-96 %] 96 %    Physical Exam  Vitals and nursing note reviewed.   Constitutional:       Appearance: Normal appearance.   HENT:      Head: Atraumatic.   Eyes:      Conjunctiva/sclera: Conjunctivae normal.      Pupils: Pupils are equal, round, and reactive to light.   Neck:      Vascular: No JVD.   Cardiovascular:      Rate and Rhythm: Normal rate and regular rhythm.      Heart sounds: Normal heart sounds.   Pulmonary:      Effort: Pulmonary effort is normal.      Breath sounds: Normal breath sounds.   Abdominal:      General: Bowel sounds are normal.      Palpations: Abdomen is soft.   Musculoskeletal:      Cervical back: Normal range of motion and neck supple.      Right lower leg: No edema.      Left lower leg: No edema.   Skin:     General: Skin is warm and dry.   Neurological:      Mental Status: She is alert and oriented to person, place, and time.   Psychiatric:         Mood and Affect: Mood normal.         Behavior: Behavior normal.         Fluids    Intake/Output Summary (Last 24 hours) at 4/19/2024 1047  Last data filed at 4/18/2024 2100  Gross per 24 hour   Intake 340 ml   Output --   Net 340 ml       Laboratory  Recent Labs     04/18/24  0529   WBC 14.7*   RBC 4.17*   HEMOGLOBIN 13.5   HEMATOCRIT 41.8   .2*   MCH 32.4   MCHC 32.3   RDW 48.4   PLATELETCT 402    MPV 10.0     Recent Labs     04/18/24  0529   SODIUM 141   POTASSIUM 5.1   CHLORIDE 98   CO2 32   GLUCOSE 145*   BUN 38*   CREATININE 0.75   CALCIUM 9.9                   Assessment/Plan  Hypophosphatemia  Assessment & Plan  PO4: 2.1  Cont supplements  Cont to monitor    Acute respiratory failure (HCC)- (present on admission)  Assessment & Plan  [place] [facility]  [place] bilateral ground glass opacities, negative for PE  Echo: EF 60%, grade II diastolic dysfunction, mild MR/mod AI  BNP: 622 --> 1233 --> 1144 (4/14)  CXR: negative acute  Soft tissue neck X-ray: unremarkable  S/P Azithromycin  On Prednisone  On Trelegy, Singulair  Note: Lasix & Aldactone on hold 2nd to azotemia  Resp & O2 protocols as needed    Essential hypertension- (present on admission)  Assessment & Plan  BP a little labile but ok  Cont Atenolol  Cont to monitor    Azotemia- (present on admission)  Assessment & Plan  Bun: 46 --> 38 --> 38  Encouraging fluid intake  Continue to hold Lasix, Aldactone  Cont to monitor    Leukocytosis- (present on admission)  Assessment & Plan  WBC's: 12.1 (4/14) --> 11.4 (4/16) --> 14.7 (4/18)  Has been afebrile  Likely 2nd to steroids -- now off (last dose 4/17)  PCT: 0.09  UA: showed 0-2 WBC, few bacteria, yeast present  CXR: negative acute  Urine cultures show Candida  Cont Diflucan (thru 4/21)  Cont to monitor    Multiple drug allergies- (present on admission)  Assessment & Plan  Extensive list noted    Persistent atrial fibrillation (HCC)- (present on admission)  Assessment & Plan  S/P PPM  Cont Atenolol  Cont Xarelto    Mild episode of recurrent major depressive disorder (HCC)- (present on admission)  Assessment & Plan  Cont Zoloft    Hypothyroidism- (present on admission)  Assessment & Plan  TSH: 0.18  FT4: 1.53  On Synthroid -- dose decreased recently by Dr. Crowell  Needs outpatient follow up

## 2024-04-19 NOTE — THERAPY
Speech Language Pathology  Daily Treatment     Patient Name: Donte Magaña  Age:  83 y.o., Sex:  female  Medical Record #: [id number]  Today's Date: 4/19/2024     Precautions  Precautions: Fall Risk  Comments: Pacemaker 2 years old per pt report    Subjective    Patient pleasant and agreeable to SLP services.     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 She reported that she feels her memory is improving, however, she feels that her lethargy is still affecting recall.    Strengths: Willingly participates in therapeutic activities, Pleasant and cooperative, Able to follow instructions, Effective communication skills  Barriers: Aspiration risk, Difficulty following instructions    Plan    Continue to target recall, attention, and problem solving.    Passport items to be completed:  Express basic needs, understand food/liquid recommendations, consistently follow swallow precautions, manage finances, manage medications, arrive to therapy appointments on time, complete daily memory log entries, solve problems related to safety situations, review education related to hospitalization, complete caregiver training     Speech Therapy Problems (Active)       Problem: Memory STGs       Dates: Start:  04/14/24          Goal: STG-Within one week, patient will implement functional memory strategies with 80% accuracy provided mod cues.        Dates: Start:  04/16/24    Expected End:  04/23/24         Goal Note filed on 04/16/24 1550 by Janessa Alvarado, Student       Patient needed max A to recall information.                  Problem: Problem Solving STGs       Dates: Start:  04/16/24         Goal: STG-Within one week, patient will complete sustained/alternating/selective attention tasks with 80% accuracy provided mod cues.       Dates: Start:  04/16/24    Expected End:  04/23/24         Goal Note filed on 04/16/24 1550 by Janessa Alvarado, Student       Patient needs mod to max A to complete problem solving and attention tasks.                  Problem: Speech/Swallowing LTGs       Dates: Start:  04/14/24         Goal: LTG-By discharge, patient will safely swallow textures/consistencies determined by MBSS to be the least restrictive with no overt s/sx of aspiration, no decline in respiratory status.       Dates: Start:  04/14/24    Expected End:  04/23/24            Goal: LTG-By discharge, patient will complete functional problem solving and recall information with 80% accuracy provided min cues.        Dates: Start:  04/16/24    Expected End:  04/23/24               Problem: Swallowing STGs       Dates: Start:  04/14/24

## 2024-04-19 NOTE — THERAPY
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  She was agreeable to shower.     Objective       04/19/24 1331   OT Charge Group   OT Self Care / ADL (Units) 4   OT Total Time Spent   OT Individual Total Time Spent (Mins) 60   Precautions   Precautions Fall Risk   Vitals   O2 (LPM) 1   O2 Delivery Device Nasal Cannula   Cognition    Comments impaired memory; does not use call light   Functional Level of Assist   Grooming Independent;Seated   Bathing Supervision   Bathing Description Grab bar;Hand held shower;Tub bench;Set-up of equipment;Supervision for safety   Upper Body Dressing Supervision   Lower Body Dressing Supervision   Lower Body Dressing Description Supervision for safety   Toileting Supervision   Toileting Description Supervision for safety   Bed, Chair, Wheelchair Transfer Standby Assist   Bed Chair Wheelchair Transfer Description Set-up of equipment;Supervision for safety   Toilet Transfers Supervised   Toilet Transfer Description Grab bar;Set-up of equipment;Supervision for safety   Tub / Shower Transfers Supervised   Tub Shower Transfer Description Grab bar;Shower bench;Set-up of equipment;Supervision for safety   Bed Mobility    Supine to Sit Supervised   Scooting Supervised   Interdisciplinary Plan of Care Collaboration   IDT Collaboration with  Nursing;Certified Nursing Assistant   Patient Position at End of Therapy Seated;Chair Alarm On;Self Releasing Lap Belt Applied;Call Light within Reach;Tray Table within Reach;Phone within Reach;Family / Friend in Room   Collaboration Comments RN and CNA informed patient had a large BM         Assessment    Patient completed adl routine with setup to supervision.  She requires supervision due to  standing up from toilet and shower bench without informing therapist and not using call light.  Memory is her biggest barrier.  Strengths: Able to follow instructions, Independent prior level of function, Motivated for self care and independence, Pleasant and cooperative, Willingly participates in therapeutic activities  Barriers: Decreased endurance, Fatigue, Generalized weakness, Hypotension, Impaired activity tolerance, Impaired balance, Impaired carryover of learning, Impaired insight/denial of deficits, Impaired functional cognition, Limited mobility    Plan      ADLs, transfers with FWW, strength/endurance building, standing tolerance/balance     Occupational Therapy Goals (Active)       Problem: Dressing       Dates: Start:  04/17/24         Goal: STG-Within one week, patient will dress LB with supervision/mod I       Dates: Start:  04/17/24    Expected End:  04/23/24               Problem: Functional Transfers       Dates: Start:  04/17/24         Goal: STG-Within one week, patient will transfer to toilet with supervision/mod I       Dates: Start:  04/17/24    Expected End:  04/23/24               Problem: OT Long Term Goals       Dates: Start:  04/13/24         Goal: LTG-By discharge, patient will complete basic self care tasks at SPV-Mod I level using DME/AE PRN.       Dates: Start:  04/13/24    Expected End:  04/23/24            Goal: LTG-By discharge, patient will perform bathroom transfers at SPV-Mod I level using DME/AE PRN.       Dates: Start:  04/13/24    Expected End:  04/23/24               Problem: Toileting       Dates: Start:  04/17/24         Goal: STG-Within one week, patient will complete toileting tasks with supervision/mod I       Dates: Start:  04/17/24    Expected End:  04/23/24

## 2024-04-20 ENCOUNTER — APPOINTMENT (OUTPATIENT)
Dept: INPATIENT REHAB | Facility: REHABILITATION | Age: 84
DRG: 291 | End: 2024-04-20
Payer: MEDICARE

## 2024-04-20 PROCEDURE — 99231 SBSQ HOSP IP/OBS SF/LOW 25: CPT | Performed by: HOSPITALIST

## 2024-04-20 PROCEDURE — A9270 NON-COVERED ITEM OR SERVICE: HCPCS | Performed by: HOSPITALIST

## 2024-04-20 PROCEDURE — 94760 N-INVAS EAR/PLS OXIMETRY 1: CPT

## 2024-04-20 PROCEDURE — 700102 HCHG RX REV CODE 250 W/ 637 OVERRIDE(OP): Performed by: PHYSICAL MEDICINE & REHABILITATION

## 2024-04-20 PROCEDURE — 86480 TB TEST CELL IMMUN MEASURE: CPT

## 2024-04-20 PROCEDURE — 94640 AIRWAY INHALATION TREATMENT: CPT

## 2024-04-20 PROCEDURE — 700102 HCHG RX REV CODE 250 W/ 637 OVERRIDE(OP): Performed by: HOSPITALIST

## 2024-04-20 PROCEDURE — A9270 NON-COVERED ITEM OR SERVICE: HCPCS | Performed by: GENERAL PRACTICE

## 2024-04-20 PROCEDURE — A9270 NON-COVERED ITEM OR SERVICE: HCPCS | Performed by: PHYSICAL MEDICINE & REHABILITATION

## 2024-04-20 PROCEDURE — 36415 COLL VENOUS BLD VENIPUNCTURE: CPT

## 2024-04-20 PROCEDURE — 94669 MECHANICAL CHEST WALL OSCILL: CPT

## 2024-04-20 PROCEDURE — 700102 HCHG RX REV CODE 250 W/ 637 OVERRIDE(OP): Performed by: GENERAL PRACTICE

## 2024-04-20 PROCEDURE — 770010 HCHG ROOM/CARE - REHAB SEMI PRIVAT*

## 2024-04-20 RX ADMIN — MONTELUKAST 10 MG: 10 TABLET, FILM COATED ORAL at 21:02

## 2024-04-20 RX ADMIN — MIRTAZAPINE 15 MG: 15 TABLET, ORALLY DISINTEGRATING ORAL at 21:02

## 2024-04-20 RX ADMIN — ATENOLOL 50 MG: 25 TABLET ORAL at 21:02

## 2024-04-20 RX ADMIN — POLYETHYLENE GLYCOL 3350 1 PACKET: 17 POWDER, FOR SOLUTION ORAL at 09:22

## 2024-04-20 RX ADMIN — VIBEGRON 75 MG: 75 TABLET, FILM COATED ORAL at 09:00

## 2024-04-20 RX ADMIN — RIVAROXABAN 20 MG: 20 TABLET, FILM COATED ORAL at 17:55

## 2024-04-20 RX ADMIN — SENNOSIDES AND DOCUSATE SODIUM 1 TABLET: 50; 8.6 TABLET ORAL at 09:22

## 2024-04-20 RX ADMIN — FLUCONAZOLE 100 MG: 100 TABLET ORAL at 09:22

## 2024-04-20 RX ADMIN — LEVOTHYROXINE SODIUM 37.5 MCG: 0.07 TABLET ORAL at 05:35

## 2024-04-20 RX ADMIN — FLUTICASONE FUROATE, UMECLIDINIUM BROMIDE AND VILANTEROL TRIFENATATE 1 PUFF: 100; 62.5; 25 POWDER RESPIRATORY (INHALATION) at 06:43

## 2024-04-20 RX ADMIN — SERTRALINE HYDROCHLORIDE 50 MG: 50 TABLET ORAL at 09:22

## 2024-04-20 RX ADMIN — SENNOSIDES AND DOCUSATE SODIUM 1 TABLET: 50; 8.6 TABLET ORAL at 21:02

## 2024-04-20 ASSESSMENT — ENCOUNTER SYMPTOMS
HEADACHES: 0
PALPITATIONS: 0
DIZZINESS: 0
BLURRED VISION: 0
FEVER: 0
NAUSEA: 0
VOMITING: 0
SHORTNESS OF BREATH: 0
HALLUCINATIONS: 0

## 2024-04-20 NOTE — CARE PLAN
The patient is Stable - Low risk of patient condition declining or worsening    Problem: Knowledge Deficit - Standard  Goal: Patient and family/care givers will demonstrate understanding of plan of care, disease process/condition, diagnostic tests and medications  Outcome: Progressing. Reviewed POC, all questions answered.      Problem: Fall Risk - Rehab  Goal: Patient will remain free from falls  Outcome: Progressing. Call device within reach, pt educated to use for assistance for safe transferring.      Shift Goals  Clinical Goals: safety  Patient Goals: Participate in therapy

## 2024-04-20 NOTE — FLOWSHEET NOTE
04/20/24 0738   Events/Summary/Plan   Events/Summary/Plan RA pulse ox check   Vital Signs   Pulse 86   Respiration 16   Pulse Oximetry 93 %   $ Pulse Oximetry (Spot Check) Yes   Respiratory Assessment   Level of Consciousness Alert   Chest Exam   Work Of Breathing / Effort Within Normal Limits  (Sitting up in wheelchair)   Oxygen   O2 Delivery Device Room air w/o2 available

## 2024-04-20 NOTE — FLOWSHEET NOTE
04/20/24 0641   Events/Summary/Plan   Events/Summary/Plan mdi and flutter   Vital Signs   Pulse 83   Respiration 16   Pulse Oximetry 98 %   $ Pulse Oximetry (Spot Check) Yes   Respiratory Assessment   Level of Consciousness Alert   Chest Exam   Work Of Breathing / Effort Within Normal Limits   Breath Sounds   RUL Breath Sounds Clear   RML Breath Sounds Clear   RLL Breath Sounds Diminished   DO Breath Sounds Clear   LLL Breath Sounds Diminished   Oxygen   O2 (LPM) 1.5   O2 Delivery Device Silicone Nasal Cannula

## 2024-04-20 NOTE — FLOWSHEET NOTE
04/20/24 0651   Events/Summary/Plan   Events/Summary/Plan RA pulse ox check   Vital Signs   Pulse 83   Respiration 18   Pulse Oximetry 90 %   $ Pulse Oximetry (Spot Check) Yes   Oxygen   O2 Delivery Device Room air w/o2 available

## 2024-04-20 NOTE — PROGRESS NOTES
NURSING DAILY NOTE    Name: Donte Magaña   Date of Admission: 4/12/2024   Admitting Diagnosis: Acute diastolic heart failure (HCC)  Attending Physician: Lisa Mercedes D.o.  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 float others one at a time,  up in wheel chair and in dining room for all meals)     Pill Administration  crushed, floated, and one at a time   Agitated Behavioral Scale     ABS Level of Severity       Fall Risk  Has the patient had a fall this admission?   No  Renetta Chauhan Fall Risk Scoring  21, HIGH RISK  Fall Risk Safety Measures  bed alarm, chair alarm, and poor balance    Vitals  Temperature: 36.6 °C (97.9 °F)  Temp src: Oral  Pulse: 82  Respiration: 16  Blood Pressure : 121/52  Blood Pressure MAP (Calculated): 75 MM HG  BP Location: Left, Upper Arm  Patient BP Position: Sitting     Oxygen  Pulse Oximetry: 94 %  O2 (LPM): 1  O2 Delivery Device: Nasal Cannula    Bowel and Bladder  Last Bowel Movement  04/19/24  Stool Type  Type 6: Fluffy pieces with ragged edges, a mushy stool  Bowel Device  Bathroom, Diaper  Continent  Bladder:     Bowel:    Bladder Function  Urine Void (mL):  (Large)  Number of Times Voided: 1  Urine Color: Yellow  Urine Clarity: Clear  Urine Odor: Malodorous  Number of Times Incontinent of  Urine: 0  Straight Catheter: 400 ml  Genitourinary Assessment   Bladder Assessment (WDL):  WDL Except  Pruett Catheter: Not Applicable  Urine Color: Yellow  Urine Clarity: Clear  Urine Odor: Malodorous  Number of Bladder Accidents: 1  Total Number of Bladder of Accidents in Last 7 Days: 4  Number of Times Incontinent of Urine: 0  Bladder Device: Bathroom  Bladder Scan: Post Void  $ Bladder Scan Results (mL): 150    Skin  Ashutosh Score   18  Sensory Interventions   Bed Types: Low Airloss  Skin Preventative Measures: Pillows in Use for Support / Positioning  Moisture Interventions  Moisturizers/Barriers: Barrier Wipes (cavilon)      Pain  Pain Rating Scale  0 - No Pain  Pain Location     Pain Location Orientation     Pain Interventions   Declines    ADLs    Bathing   Shower, Staff  Linen Change   Complete  Personal Hygiene  Moist Edie Wipes, Change Edie Pads, Perineal Care, Edie Bottle  Chlorhexidine Bath      Oral Care  Brushed Teeth  Teeth/Dentures     Shave     Nutrition Percentage Eaten  Snack, Between % Consumed  Environmental Precautions  Treaded Slipper Socks on Patient  Patient Turns/Positioning  Patient Turns Self from Side to Side  Patient Turns Assistance/Tolerance     Bed Positions  Bed Controls On  Head of Bed Elevated  Self regulated      Psychosocial/Neurologic Assessment  Psychosocial Assessment  Psychosocial (WDL):  Within Defined Limits  Patient Behaviors: Fatigue, Forgetful  Neurologic Assessment  Neuro (WDL): Exceptions to WDL  Level of Consciousness: Alert  Orientation Level: Oriented to person, Oriented to situation, Disoriented to place, Disoriented to time  Cognition: Confused in conversation, Follows commands  Speech: Delayed responses, Clear  Pupil Assesment: No  R Pupil Size (mm): 3  R Pupil Shape / Description: Round  R Pupil Reaction: Brisk  L Pupil Size (mm): 3  L Pupil Shape / Description: Round  L Pupil Reaction: Brisk  EENT (WDL):  WDL Except    Cardio/Pulmonary Assessment  Edema       Respiratory Breath Sounds  RUL Breath Sounds: Clear  RML Breath Sounds: Clear  RLL Breath Sounds: Diminished  DO Breath Sounds: Clear  LLL Breath Sounds: Diminished  Cardiac Assessment   Cardiac (WDL):  WDL Except

## 2024-04-20 NOTE — CARE PLAN
Problem: Knowledge Deficit - Standard  Goal: Patient and family/care givers will demonstrate understanding of plan of care, disease process/condition, diagnostic tests and medications  Outcome: Progressing   Pt education given regarding plan of care with emphasis on adequate hydration, pt shows poor understanding, will continue to reinforce education.           Problem: Fall Risk - Rehab  Goal: Patient will remain free from falls  Outcome: Progressing   Pt education given regarding fall precautions AND safety measures, pt shows no understanding, has attempted to self transfer this shift, will continue to reinforce education.

## 2024-04-20 NOTE — PROGRESS NOTES
Hospital Medicine Daily Progress Note        Chief Complaint  Hypertension  Afib  Leukocytosis    Interval Problem Update  No complaints.  Doing ok.    Review of Systems  Review of Systems   Constitutional:  Negative for fever.   Eyes:  Negative for blurred vision.   Respiratory:  Negative for shortness of breath.    Cardiovascular:  Negative for palpitations.   Gastrointestinal:  Negative for nausea and vomiting.   Neurological:  Negative for dizziness and headaches.   Psychiatric/Behavioral:  Negative for hallucinations.         Physical Exam  Temp:  [36.6 °C (97.9 °F)-37.2 °C (98.9 °F)] 37.2 °C (98.9 °F)  Pulse:  [76-92] 92  Resp:  [16-20] 20  BP: (105-128)/(46-61) 105/46  SpO2:  [90 %-99 %] 99 %    Physical Exam  Vitals and nursing note reviewed.   Constitutional:       General: She is not in acute distress.  HENT:      Mouth/Throat:      Mouth: Mucous membranes are moist.      Pharynx: Oropharynx is clear.   Eyes:      General: No scleral icterus.  Neck:      Vascular: No JVD.   Cardiovascular:      Rate and Rhythm: Normal rate and regular rhythm.      Heart sounds: Normal heart sounds.   Pulmonary:      Effort: Pulmonary effort is normal.      Breath sounds: No wheezing or rales.   Abdominal:      General: Bowel sounds are normal.      Palpations: Abdomen is soft.   Musculoskeletal:      Cervical back: No rigidity.      Right lower leg: No edema.      Left lower leg: No edema.   Skin:     General: Skin is warm and dry.   Neurological:      Mental Status: She is alert and oriented to person, place, and time.   Psychiatric:         Mood and Affect: Mood normal.         Behavior: Behavior normal.         Fluids    Intake/Output Summary (Last 24 hours) at 4/20/2024 1111  Last data filed at 4/20/2024 1020  Gross per 24 hour   Intake 480 ml   Output --   Net 480 ml       Laboratory  Recent Labs     04/18/24  0529   WBC 14.7*   RBC 4.17*   HEMOGLOBIN 13.5   HEMATOCRIT 41.8   .2*   MCH 32.4   MCHC 32.3   RDW  48.4   PLATELETCT 402   MPV 10.0     Recent Labs     04/18/24  0529   SODIUM 141   POTASSIUM 5.1   CHLORIDE 98   CO2 32   GLUCOSE 145*   BUN 38*   CREATININE 0.75   CALCIUM 9.9                   Assessment/Plan  Hypophosphatemia  Assessment & Plan  PO4: 2.1  Cont supplements  Cont to monitor    Acute respiratory failure (HCC)- (present on admission)  Assessment & Plan  Resolved (from [facility] [place])  CT bilateral ground glass opacities, negative for PE  Echo: EF 60%, grade II diastolic dysfunction, mild MR/mod AI  BNP: 622 --> 1233 --> 1144 (4/14)  CXR: negative acute  Soft tissue neck X-ray: unremarkable  S/P Azithromycin  On Prednisone  On Trelegy, Singulair  Note: Lasix & Aldactone on hold 2nd to azotemia  Resp & O2 protocols as needed    Essential hypertension- (present on admission)  Assessment & Plan  BP ok  Cont Atenolol  Cont to monitor    Azotemia- (present on admission)  Assessment & Plan  Bun: 46 --> 38 --> 38  Encouraging fluid intake  Continue to hold Lasix, Aldactone  Cont to monitor    Leukocytosis- (present on admission)  Assessment & Plan  WBC's: 12.1 (4/14) --> 11.4 (4/16) --> 14.7 (4/18)  Has been afebrile  Likely 2nd to steroids -- now off (last dose 4/17)  PCT: 0.09  UA: showed 0-2 WBC, few bacteria, yeast present  CXR: negative acute  Urine cultures show Candida  Cont Diflucan (thru 4/21)  Cont to monitor    Multiple drug allergies- (present on admission)  Assessment & Plan  Extensive list noted    Persistent atrial fibrillation (HCC)- (present on admission)  Assessment & Plan  S/P PPM  Cont Atenolol  Cont Xarelto    Mild episode of recurrent major depressive disorder (HCC)- (present on admission)  Assessment & Plan  Cont Zoloft    Hypothyroidism- (present on admission)  Assessment & Plan  TSH: 0.18  FT4: 1.53  On Synthroid -- dose decreased recently by Dr. Crowell  Needs outpatient follow up

## 2024-04-20 NOTE — PROGRESS NOTES
NURSING DAILY NOTE    Name: Donte Magaña   Date of Admission: 4/12/2024   Admitting Diagnosis: Acute diastolic heart failure (HCC)  Attending Physician: Lisa Mercedes D.o.  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 float others one at a time,  up in wheel chair and in dining room for all meals)     Pill Administration  whole and floated with pudding  Agitated Behavioral Scale     ABS Level of Severity       Fall Risk  Has the patient had a fall this admission?   No  Renetta Chauhan Fall Risk Scoring  21, HIGH RISK  Fall Risk Safety Measures  bed alarm and chair alarm    Vitals  Temperature: 36.7 °C (98 °F)  Temp src: Temporal  Pulse: 76  Respiration: 16  Blood Pressure : 127/61  Blood Pressure MAP (Calculated): 83 MM HG  BP Location: Left, Upper Arm  Patient BP Position: Supine     Oxygen  Pulse Oximetry: 96 %  O2 (LPM): 1  O2 Delivery Device: Nasal Cannula    Bowel and Bladder  Last Bowel Movement  04/19/24  Stool Type  Type 6: Fluffy pieces with ragged edges, a mushy stool  Bowel Device  Bathroom, Diaper  Continent  Bladder:     Bowel:    Bladder Function  Urine Void (mL):  (Large)  Number of Times Voided: 1  Urine Color: Unable To Evaluate  Urine Clarity: Clear  Urine Odor: Malodorous  Number of Times Incontinent of Urine:  0  Straight Catheter: 400 ml  Genitourinary Assessment   Bladder Assessment (WDL):  WDL Except  Pruett Catheter: Not Applicable  Urine Color: Unable To Evaluate  Urine Clarity: Clear  Urine Odor: Malodorous  Number of Bladder Accidents: 1  Total Number of Bladder of Accidents in Last 7 Days: 5  Number of Times Incontinent of Urine: 0  Bladder Device: Bathroom  Bladder Scan: Post Void  $ Bladder Scan Results (mL): 150    Skin  Ashutosh Score   18  Sensory Interventions   Bed Types: Low Airloss  Skin Preventative Measures: Pillows in Use for Support / Positioning  Moisture Interventions  Moisturizers/Barriers: Barrier Wipes ([place])      Pain  Pain Rating Scale  0 - No Pain  Pain Location     Pain Location Orientation     Pain Interventions   Declines    ADLs    Bathing   Shower, Staff  Linen Change   Complete  Personal Hygiene  Moist Edie Wipes, Change Edie Pads, Perineal Care, Edie Bottle  Chlorhexidine Bath      Oral Care  Brushed Teeth  Teeth/Dentures     Shave     Nutrition Percentage Eaten  Snack, Between % Consumed  Environmental Precautions  Treaded Slipper Socks on Patient  Patient Turns/Positioning  Patient Turns Self from Side to Side  Patient Turns Assistance/Tolerance     Bed Positions  Bed Controls On  Head of Bed Elevated  Self regulated      Psychosocial/Neurologic Assessment  Psychosocial Assessment  Psychosocial (WDL):  Within Defined Limits  Patient Behaviors: Fatigue, Forgetful  Neurologic Assessment  Neuro (WDL): Exceptions to WDL  Level of Consciousness: Alert  Orientation Level: Oriented to person, Oriented to situation, Disoriented to place, Disoriented to time  Cognition: Confused in conversation, Follows commands  Speech: Delayed responses, Clear  Pupil Assesment: No  R Pupil Size (mm): 3  R Pupil Shape / Description: Round  R Pupil Reaction: Brisk  L Pupil Size (mm): 3  L Pupil Shape / Description: Round  L Pupil Reaction: Brisk  EENT (WDL):  WDL Except    Cardio/Pulmonary  Assessment  Edema      Respiratory Breath Sounds  RUL Breath Sounds: Clear  RML Breath Sounds: Clear  RLL Breath Sounds: Diminished  DO Breath Sounds: Clear  LLL Breath Sounds: Diminished  Cardiac Assessment   Cardiac (WDL):  WDL Except

## 2024-04-21 ENCOUNTER — APPOINTMENT (OUTPATIENT)
Dept: OCCUPATIONAL THERAPY | Facility: REHABILITATION | Age: 84
DRG: 291 | End: 2024-04-21
Attending: PHYSICAL MEDICINE & REHABILITATION
Payer: MEDICARE

## 2024-04-21 ENCOUNTER — APPOINTMENT (OUTPATIENT)
Dept: PHYSICAL THERAPY | Facility: REHABILITATION | Age: 84
DRG: 291 | End: 2024-04-21
Attending: PHYSICAL MEDICINE & REHABILITATION
Payer: MEDICARE

## 2024-04-21 ENCOUNTER — APPOINTMENT (OUTPATIENT)
Dept: INPATIENT REHAB | Facility: REHABILITATION | Age: 84
DRG: 291 | End: 2024-04-21
Payer: MEDICARE

## 2024-04-21 LAB
ANION GAP SERPL CALC-SCNC: 10 MMOL/L (ref 7–16)
BASOPHILS # BLD AUTO: 0.6 % (ref 0–1.8)
BASOPHILS # BLD: 0.08 K/UL (ref 0–0.12)
BUN SERPL-MCNC: 31 MG/DL (ref 8–22)
CALCIUM SERPL-MCNC: 9.2 MG/DL (ref 8.5–10.5)
CHLORIDE SERPL-SCNC: 103 MMOL/L (ref 96–112)
CO2 SERPL-SCNC: 27 MMOL/L (ref 20–33)
CREAT SERPL-MCNC: 0.94 MG/DL (ref 0.5–1.4)
EOSINOPHIL # BLD AUTO: 0.71 K/UL (ref 0–0.51)
EOSINOPHIL NFR BLD: 5.5 % (ref 0–6.9)
ERYTHROCYTE [DISTWIDTH] IN BLOOD BY AUTOMATED COUNT: 49 FL (ref 35.9–50)
GFR SERPLBLD CREATININE-BSD FMLA CKD-EPI: 59 ML/MIN/1.73 M 2
GLUCOSE SERPL-MCNC: 148 MG/DL (ref 65–99)
HCT VFR BLD AUTO: 38.3 % (ref 37–47)
HGB BLD-MCNC: 12.3 G/DL (ref 12–16)
IMM GRANULOCYTES # BLD AUTO: 0.12 K/UL (ref 0–0.11)
IMM GRANULOCYTES NFR BLD AUTO: 0.9 % (ref 0–0.9)
LYMPHOCYTES # BLD AUTO: 3.19 K/UL (ref 1–4.8)
LYMPHOCYTES NFR BLD: 24.6 % (ref 22–41)
MAGNESIUM SERPL-MCNC: 2 MG/DL (ref 1.5–2.5)
MCH RBC QN AUTO: 31.9 PG (ref 27–33)
MCHC RBC AUTO-ENTMCNC: 32.1 G/DL (ref 32.2–35.5)
MCV RBC AUTO: 99.5 FL (ref 81.4–97.8)
MONOCYTES # BLD AUTO: 0.85 K/UL (ref 0–0.85)
MONOCYTES NFR BLD AUTO: 6.6 % (ref 0–13.4)
NEUTROPHILS # BLD AUTO: 8 K/UL (ref 1.82–7.42)
NEUTROPHILS NFR BLD: 61.8 % (ref 44–72)
NRBC # BLD AUTO: 0 K/UL
NRBC BLD-RTO: 0 /100 WBC (ref 0–0.2)
NT-PROBNP SERPL IA-MCNC: 529 PG/ML (ref 0–125)
PHOSPHATE SERPL-MCNC: 3.6 MG/DL (ref 2.5–4.5)
PLATELET # BLD AUTO: 307 K/UL (ref 164–446)
PMV BLD AUTO: 10.1 FL (ref 9–12.9)
POTASSIUM SERPL-SCNC: 4.9 MMOL/L (ref 3.6–5.5)
RBC # BLD AUTO: 3.85 M/UL (ref 4.2–5.4)
SODIUM SERPL-SCNC: 140 MMOL/L (ref 135–145)
WBC # BLD AUTO: 13 K/UL (ref 4.8–10.8)

## 2024-04-21 PROCEDURE — 36415 COLL VENOUS BLD VENIPUNCTURE: CPT

## 2024-04-21 PROCEDURE — 94669 MECHANICAL CHEST WALL OSCILL: CPT

## 2024-04-21 PROCEDURE — A9270 NON-COVERED ITEM OR SERVICE: HCPCS | Performed by: HOSPITALIST

## 2024-04-21 PROCEDURE — 83880 ASSAY OF NATRIURETIC PEPTIDE: CPT

## 2024-04-21 PROCEDURE — A9270 NON-COVERED ITEM OR SERVICE: HCPCS | Performed by: GENERAL PRACTICE

## 2024-04-21 PROCEDURE — 700102 HCHG RX REV CODE 250 W/ 637 OVERRIDE(OP): Performed by: PHYSICAL MEDICINE & REHABILITATION

## 2024-04-21 PROCEDURE — 770010 HCHG ROOM/CARE - REHAB SEMI PRIVAT*

## 2024-04-21 PROCEDURE — 700102 HCHG RX REV CODE 250 W/ 637 OVERRIDE(OP): Performed by: GENERAL PRACTICE

## 2024-04-21 PROCEDURE — 94640 AIRWAY INHALATION TREATMENT: CPT

## 2024-04-21 PROCEDURE — A9270 NON-COVERED ITEM OR SERVICE: HCPCS | Performed by: PHYSICAL MEDICINE & REHABILITATION

## 2024-04-21 PROCEDURE — 84100 ASSAY OF PHOSPHORUS: CPT

## 2024-04-21 PROCEDURE — 94760 N-INVAS EAR/PLS OXIMETRY 1: CPT

## 2024-04-21 PROCEDURE — 700102 HCHG RX REV CODE 250 W/ 637 OVERRIDE(OP): Performed by: HOSPITALIST

## 2024-04-21 PROCEDURE — 80048 BASIC METABOLIC PNL TOTAL CA: CPT

## 2024-04-21 PROCEDURE — 85025 COMPLETE CBC W/AUTO DIFF WBC: CPT

## 2024-04-21 PROCEDURE — 83735 ASSAY OF MAGNESIUM: CPT

## 2024-04-21 PROCEDURE — 97110 THERAPEUTIC EXERCISES: CPT

## 2024-04-21 PROCEDURE — 99231 SBSQ HOSP IP/OBS SF/LOW 25: CPT | Performed by: HOSPITALIST

## 2024-04-21 RX ADMIN — RIVAROXABAN 20 MG: 20 TABLET, FILM COATED ORAL at 18:08

## 2024-04-21 RX ADMIN — SERTRALINE HYDROCHLORIDE 50 MG: 50 TABLET ORAL at 08:55

## 2024-04-21 RX ADMIN — MONTELUKAST 10 MG: 10 TABLET, FILM COATED ORAL at 20:59

## 2024-04-21 RX ADMIN — MIRTAZAPINE 15 MG: 15 TABLET, ORALLY DISINTEGRATING ORAL at 20:59

## 2024-04-21 RX ADMIN — FLUCONAZOLE 100 MG: 100 TABLET ORAL at 08:55

## 2024-04-21 RX ADMIN — SENNOSIDES AND DOCUSATE SODIUM 1 TABLET: 50; 8.6 TABLET ORAL at 08:55

## 2024-04-21 RX ADMIN — SENNOSIDES AND DOCUSATE SODIUM 1 TABLET: 50; 8.6 TABLET ORAL at 20:59

## 2024-04-21 RX ADMIN — ATENOLOL 50 MG: 25 TABLET ORAL at 20:59

## 2024-04-21 RX ADMIN — LEVOTHYROXINE SODIUM 37.5 MCG: 0.07 TABLET ORAL at 05:31

## 2024-04-21 RX ADMIN — POLYETHYLENE GLYCOL 3350 1 PACKET: 17 POWDER, FOR SOLUTION ORAL at 08:55

## 2024-04-21 RX ADMIN — FLUTICASONE FUROATE, UMECLIDINIUM BROMIDE AND VILANTEROL TRIFENATATE 1 PUFF: 100; 62.5; 25 POWDER RESPIRATORY (INHALATION) at 08:24

## 2024-04-21 RX ADMIN — VIBEGRON 75 MG: 75 TABLET, FILM COATED ORAL at 09:00

## 2024-04-21 ASSESSMENT — ENCOUNTER SYMPTOMS
DIZZINESS: 0
DIARRHEA: 0
FEVER: 0
NERVOUS/ANXIOUS: 0
BLURRED VISION: 0
COUGH: 0

## 2024-04-21 ASSESSMENT — PAIN DESCRIPTION - PAIN TYPE: TYPE: ACUTE PAIN

## 2024-04-21 NOTE — FLOWSHEET NOTE
04/21/24 0830   Inhalation Therapy Treatment   $ MDI/DPI Given MDI/DPI x 1   Chest Physiotherapy Treatment   $ PEP/CPT Performed PEP / Flutter

## 2024-04-21 NOTE — CARE PLAN
"  Problem: Fall Risk - Rehab  Goal: Patient will remain free from falls  Outcome: Progressing     Renetta Chauhan Fall risk Assessment Score: 22    High fall risk Interventions   - Alarming seatbelt  - Wander guard  - Bed and strip alarm   - Yellow sign by the door   - Yellow wrist band \"Fall risk\"  - Room near to the nurse station  - Do not leave patient unattended in the bathroom  - Fall risk education provided        Problem: Bladder / Voiding  Goal: Patient will establish and maintain bladder regimen  Outcome: Progressing. Patient calls appropriately to use the bathroom, doing well with time voids.        "

## 2024-04-21 NOTE — PROGRESS NOTES
NURSING DAILY NOTE    Name: Donte Magaña   Date of Admission: 4/12/2024   Admitting Diagnosis: Acute diastolic heart failure (HCC)  Attending Physician: Lisa Mercedes D.o.  Allergies: Amiodarone, Bactrim, Cipro xr, Metoprolol, Morphine, Phytoplex z-guard [petrolatum-zinc oxide], Pseudoephedrine, Qvar [beclomethasone dipropionate], Vibramycin, Atorvastatin calcium-polysorbate 80, Augmentin, Diltiazem, Flecainide, Keflex, Mucinex, Tramadol, Atorvastatin, and Tape    Safety  Patient Assist  CGA  Patient Precautions  Fall Risk  Precaution Comments  Pacemaker 2 years old per pt report  Bed Transfer Status  Standby Assist  Toilet Transfer Status   Supervised  Assistive Devices  Rails, Wheelchair  Oxygen  Room air w/o2 available  Diet/Therapeutic Dining  Current Diet Order   Procedures    Diet Order Diet: Regular (cruch meds that can be crushe in puree. float others one at a time,  up in wheel chair and in dining room for all meals)     Pill Administration  whole, floated, and one at a time   Agitated Behavioral Scale     ABS Level of Severity       Fall Risk  Has the patient had a fall this admission?   No  Renetta Chauhan Fall Risk Scoring  21, HIGH RISK  Fall Risk Safety Measures  bed alarm, poor balance, and low vision/ hearing    Vitals  Temperature: 36.7 °C (98.1 °F)  Temp src: Oral  Pulse: 82  Respiration: 18  Blood Pressure : 128/57  Blood Pressure MAP (Calculated): 81 MM HG  BP Location: Left, Upper Arm  Patient BP Position: Sitting     Oxygen  Pulse Oximetry: 94 %  O2 (LPM): 1.5  O2 Delivery Device: Room air w/o2 available    Bowel and Bladder  Last Bowel Movement  04/20/24  Stool Type  Type 5: Soft blob with clear cut edges (passed easily)  Bowel Device  Bathroom, Diaper  Continent  Bladder:     Bowel:    Bladder Function  Urine Void (mL):  (Large)  Number of Times Voided: 1  Urine Color: Yellow  Urine Clarity: Clear  Urine Odor: Malodorous  Number  of Times Incontinent of Urine: 0  Straight Catheter: 400 ml  Genitourinary Assessment   Bladder Assessment (WDL):  WDL Except  Pruett Catheter: Not Applicable  Urine Color: Yellow  Urine Clarity: Clear  Urine Odor: Malodorous  Number of Bladder Accidents: 1  Total Number of Bladder of Accidents in Last 7 Days: 6  Number of Times Incontinent of Urine: 0  Bladder Device: Bathroom  Bladder Scan: Post Void  $ Bladder Scan Results (mL): 150    Skin  Ashutosh Score   15  Sensory Interventions   Bed Types: Low Airloss  Skin Preventative Measures: Pillows in Use for Support / Positioning  Moisture Interventions  Moisturizers/Barriers: Barrier Wipes ([place])      Pain  Pain Rating Scale  0 - No Pain  Pain Location     Pain Location Orientation     Pain Interventions   Declines    ADLs    Bathing   Shower, Staff  Linen Change   Partial  Personal Hygiene  Moist Edie Wipes, Perineal Care  Chlorhexidine Bath      Oral Care  Brushed Teeth  Teeth/Dentures     Shave     Nutrition Percentage Eaten  *  * Meal *  *, Lunch, Between % Consumed  Environmental Precautions  Treaded Slipper Socks on Patient  Patient Turns/Positioning  Patient Turns Self from Side to Side  Patient Turns Assistance/Tolerance     Bed Positions  Bed Controls On  Head of Bed Elevated  Self regulated      Psychosocial/Neurologic Assessment  Psychosocial Assessment  Psychosocial (WDL):  Within Defined Limits  Patient Behaviors: Fatigue, Forgetful  Neurologic Assessment  Neuro (WDL): Exceptions to WDL  Level of Consciousness: Alert  Orientation Level: Oriented to person, Oriented to situation, Disoriented to place, Disoriented to time  Cognition: Confused in conversation, Follows commands  Speech: Delayed responses, Clear  Pupil Assesment: No  R Pupil Size (mm): 3  R Pupil Shape / Description: Round  R Pupil Reaction: Brisk  L Pupil Size (mm): 3  L Pupil Shape / Description: Round  L Pupil Reaction: Brisk  EENT (WDL):  WDL Except    Cardio/Pulmonary  Assessment  Edema      Respiratory Breath Sounds  RUL Breath Sounds: Clear  RML Breath Sounds: Clear  RLL Breath Sounds: Diminished  DO Breath Sounds: Clear  LLL Breath Sounds: Diminished  Cardiac Assessment   Cardiac (WDL):  WDL Except

## 2024-04-21 NOTE — PROGRESS NOTES
NURSING DAILY NOTE    Name: Donte Magaña   Date of Admission: 4/12/2024   Admitting Diagnosis: Acute diastolic heart failure (HCC)  Attending Physician: Lisa Mercedes D.o.  Allergies: Amiodarone, Bactrim, Cipro xr, Metoprolol, Morphine, Phytoplex z-guard [petrolatum-zinc oxide], Pseudoephedrine, Qvar [beclomethasone dipropionate], Vibramycin, Atorvastatin calcium-polysorbate 80, Augmentin, Diltiazem, Flecainide, Keflex, Mucinex, Tramadol, Atorvastatin, and Tape    Safety  Patient Assist  min- mod  Patient Precautions  Fall Risk  Precaution Comments  Pacemaker 2 years old per pt report  Bed Transfer Status  Standby Assist  Toilet Transfer Status   Supervised  Assistive Devices  Rails, Wheelchair  Oxygen  Nasal Cannula  Diet/Therapeutic Dining  Current Diet Order   Procedures    Diet Order Diet: Regular (cruch meds that can be crushe in puree. float others one at a time,  up in wheel chair and in dining room for all meals)     Pill Administration  floated and one at a time   Agitated Behavioral Scale     ABS Level of Severity       Fall Risk  Has the patient had a fall this admission?   No  Renetta Chauhan Fall Risk Scoring  22, HIGH RISK  Fall Risk Safety Measures  bed alarm, chair alarm, and poor balance    Vitals  Temperature: 36.5 °C (97.7 °F)  Temp src: Oral  Pulse: 80  Respiration: 16  Blood Pressure : (!) 141/66  Blood Pressure MAP (Calculated): 91 MM HG  BP Location: Left, Upper Arm  Patient BP Position: Supine     Oxygen  Pulse Oximetry: 98 %  O2 (LPM): 1  O2 Delivery Device: Nasal Cannula    Bowel and Bladder  Last Bowel Movement  04/20/24  Stool Type  Type 5: Soft blob with clear cut edges (passed easily)  Bowel Device  Bathroom, Diaper  Continent  Bladder:     Bowel:    Bladder Function  Urine Void (mL):  (moderate void)  Number of Times Voided: 1  Urine Color: Yellow  Urine Clarity: Clear  Urine Odor: Malodorous  Number of Times Incontinent of  Urine: 0  Straight Catheter: 400 ml  Genitourinary Assessment   Bladder Assessment (WDL):  WDL Except  Pruett Catheter: Not Applicable  Urine Color: Yellow  Urine Clarity: Clear  Urine Odor: Malodorous  Number of Bladder Accidents: 1  Total Number of Bladder of Accidents in Last 7 Days: 6  Number of Times Incontinent of Urine: 0  Bladder Device: Bathroom  Bladder Scan: Post Void  $ Bladder Scan Results (mL): 150    Skin  Ashutosh Score   15  Sensory Interventions   Bed Types: Low Airloss  Skin Preventative Measures: Pillows in Use for Support / Positioning  Moisture Interventions  Moisturizers/Barriers:  ([place])      Pain  Pain Rating Scale  0 - No Pain  Pain Location     Pain Location Orientation     Pain Interventions   Declines    ADLs    Bathing   Shower, Staff  Linen Change   Partial  Personal Hygiene  Moist Edie Wipes, Perineal Care  Chlorhexidine Bath      Oral Care  Brushed Teeth  Teeth/Dentures     Shave     Nutrition Percentage Eaten  *  * Meal *  *, Lunch, Between % Consumed  Environmental Precautions  Treaded Slipper Socks on Patient  Patient Turns/Positioning  Patient Turns Self from Side to Side  Patient Turns Assistance/Tolerance     Bed Positions  Bed Controls On, Bed Locked  Head of Bed Elevated  Self regulated      Psychosocial/Neurologic Assessment  Psychosocial Assessment  Psychosocial (WDL):  Within Defined Limits  Patient Behaviors: Fatigue, Forgetful  Neurologic Assessment  Neuro (WDL): Exceptions to WDL  Level of Consciousness: Alert  Orientation Level: Oriented to person, Oriented to situation, Disoriented to place, Disoriented to time  Cognition: Confused in conversation, Follows commands  Speech: Delayed responses, Clear  Pupil Assesment: No  R Pupil Size (mm): 3  R Pupil Shape / Description: Round  R Pupil Reaction: Brisk  L Pupil Size (mm): 3  L Pupil Shape / Description: Round  L Pupil Reaction: Brisk  EENT (WDL):  WDL Except    Cardio/Pulmonary Assessment  Edema      Respiratory  Breath Sounds  RUL Breath Sounds: Clear  RML Breath Sounds: Clear  RLL Breath Sounds: Diminished  DO Breath Sounds: Clear  LLL Breath Sounds: Diminished  Cardiac Assessment   Cardiac (WDL):  WDL Except (A-fib, HTN)

## 2024-04-21 NOTE — THERAPY
Occupational Therapy  Daily Treatment     Patient Name: Donte Magaña  Age:  83 y.o., Sex:  female  Medical Record #: [id number]  Today's Date: 4/21/2024     Precautions  Precautions: (P) Fall Risk  Comments: (P) Pacemaker         Subjective    Patient reported she was really sleepy.Ot checked O2 and found WNL.        Objective       04/21/24 1031   OT Charge Group   Charges Yes   OT Therapeutic Exercise (Units) 4   OT Total Time Spent   OT Individual Total Time Spent (Mins) 60   Precautions   Precautions Fall Risk   Comments Pacemaker   Vitals   Pulse Oximetry 94 %   O2 Delivery Device Room air w/o2 available   Pain   Intervention Declines   Pain 0 - 10 Group   Therapist Pain Assessment Prior to Activity;0   Sitting Upper Body Exercises   Sitting Upper Body Exercises Yes   Chest Fly 2 sets of 15;Bilateral;Medium Resistance Theraband   Chest Press 2 sets of 10;Bilateral;Medium Resistance Theraband   Front Arm Raise 2 sets of 10;Bilateral;Medium Resistance Theraband   Bilateral Row 2 sets of 10;Bilateral;Medium Resistance Theraband   Bicep Curls 2 sets of 10;Bilateral;Medium Resistance Theraband   Tricep Press 2 sets of 10;Bilateral;Medium Resistance Theraband   Upper Extremity Bike Level 2 Resistance   Comments 10min   Balance   Sitting Balance (Static) Fair +   Sitting Balance (Dynamic) Fair +   Weight Shift Sitting Good   Interdisciplinary Plan of Care Collaboration   IDT Collaboration with  Certified Nursing Assistant   Patient Position at End of Therapy Seated;Chair Alarm On;Other (Comments)  (In dining room)   Collaboration Comments Patient location   Strengths & Barriers   Strengths Able to follow instructions;Independent prior level of function;Motivated for self care and independence;Willingly participates in therapeutic activities   Barriers Decreased endurance;Fatigue;Generalized weakness;Hypotension;Impaired activity tolerance     UE Exercises program completed with increased rest periods between  exercises due to patient fatigue.     Assessment    Patient tolerated fair. She was falling asleep between exercises. She required verbal and tactile cues for body positioning, posture, and form.   Strengths: (P) Able to follow instructions, Independent prior level of function, Motivated for self care and independence, Willingly participates in therapeutic activities  Barriers: (P) Decreased endurance, Fatigue, Generalized weakness, Hypotension, Impaired activity tolerance    Plan    Continue OT POC    DME  OT DME Recommendations  Additional Equipment:  (Non aticipated; pt has FWW, SPC, scooter)    Passport items to be completed:  Perform bathroom transfers, complete dressing, complete feeding, get ready for the day, prepare a simple meal, participate in household tasks, adapt home for safety needs, demonstrate home exercise program, complete caregiver training     Occupational Therapy Goals (Active)       Problem: Dressing       Dates: Start:  04/17/24         Goal: STG-Within one week, patient will dress LB with supervision/mod I       Dates: Start:  04/17/24    Expected End:  04/23/24               Problem: Functional Transfers       Dates: Start:  04/17/24         Goal: STG-Within one week, patient will transfer to toilet with supervision/mod I       Dates: Start:  04/17/24    Expected End:  04/23/24               Problem: OT Long Term Goals       Dates: Start:  04/13/24         Goal: LTG-By discharge, patient will complete basic self care tasks at SPV-Mod I level using DME/AE PRN.       Dates: Start:  04/13/24    Expected End:  04/23/24            Goal: LTG-By discharge, patient will perform bathroom transfers at SPV-Mod I level using DME/AE PRN.       Dates: Start:  04/13/24    Expected End:  04/23/24               Problem: Toileting       Dates: Start:  04/17/24         Goal: STG-Within one week, patient will complete toileting tasks with supervision/mod I       Dates: Start:  04/17/24    Expected End:   04/23/24

## 2024-04-21 NOTE — PROGRESS NOTES
Hospital Medicine Daily Progress Note        Chief Complaint  Hypertension  Afib  Leukocytosis    Interval Problem Update  No significant events overnight.    Review of Systems  Review of Systems   Constitutional:  Negative for fever.   Eyes:  Negative for blurred vision.   Respiratory:  Negative for cough.    Cardiovascular:  Negative for chest pain.   Gastrointestinal:  Negative for diarrhea.   Musculoskeletal:  Negative for joint pain.   Neurological:  Negative for dizziness.   Psychiatric/Behavioral:  The patient is not nervous/anxious.         Physical Exam  Temp:  [36.5 °C (97.7 °F)-37.1 °C (98.8 °F)] 37.1 °C (98.8 °F)  Pulse:  [80-89] 85  Resp:  [14-18] 14  BP: (107-141)/(50-66) 107/50  SpO2:  [93 %-98 %] 93 %    Physical Exam  Vitals and nursing note reviewed.   Constitutional:       Appearance: She is not diaphoretic.   HENT:      Mouth/Throat:      Pharynx: No oropharyngeal exudate or posterior oropharyngeal erythema.   Eyes:      Extraocular Movements: Extraocular movements intact.   Neck:      Vascular: No carotid bruit or JVD.   Cardiovascular:      Rate and Rhythm: Normal rate and regular rhythm.      Heart sounds: Normal heart sounds.   Pulmonary:      Effort: Pulmonary effort is normal.      Breath sounds: No wheezing or rales.   Abdominal:      General: There is no distension.      Palpations: Abdomen is soft.      Tenderness: There is no abdominal tenderness.   Musculoskeletal:      Right lower leg: No edema.      Left lower leg: No edema.   Skin:     General: Skin is warm and dry.   Neurological:      Mental Status: She is alert and oriented to person, place, and time.   Psychiatric:         Mood and Affect: Mood normal.         Behavior: Behavior normal.         Fluids    Intake/Output Summary (Last 24 hours) at 4/21/2024 1106  Last data filed at 4/20/2024 1915  Gross per 24 hour   Intake 360 ml   Output --   Net 360 ml       Laboratory  Recent Labs     04/21/24  0556   WBC 13.0*   RBC 3.85*    HEMOGLOBIN 12.3   HEMATOCRIT 38.3   MCV 99.5*   MCH 31.9   MCHC 32.1*   RDW 49.0   PLATELETCT 307   MPV 10.1     Recent Labs     04/21/24  0556   SODIUM 140   POTASSIUM 4.9   CHLORIDE 103   CO2 27   GLUCOSE 148*   BUN 31*   CREATININE 0.94   CALCIUM 9.2                   Assessment/Plan  Hypophosphatemia  Assessment & Plan  PO4: 2.1  Cont supplements  Cont to monitor    Acute respiratory failure (HCC)- (present on admission)  Assessment & Plan  Resolved (from [facility])  CT bilateral ground glass opacities, negative for PE  Echo: EF 60%, grade II diastolic dysfunction, mild MR/mod AI  BNP: 622 --> 1233 --> 1144 (4/14) --> 529 (4/21)  CXR: negative acute  Soft tissue neck X-ray: unremarkable  S/P Azithromycin  On Prednisone  On Trelegy, Singulair  Note: Lasix & Aldactone on hold 2nd to azotemia  Resp & O2 protocols as needed    Essential hypertension- (present on admission)  Assessment & Plan  BP ok  Cont Atenolol  Cont to monitor    Azotemia- (present on admission)  Assessment & Plan  Bun: 46 --> 38 --> 38 --> 31  Encouraging fluid intake  Continue to hold Lasix, Aldactone  Cont to monitor    Leukocytosis- (present on admission)  Assessment & Plan  WBC's: 12.1 (4/14) --> 11.4 (4/16) --> 14.7 (4/18) --> 13.0 (4/21)  Has been afebrile  Likely 2nd to steroids -- now off (last dose 4/17)  PCT: 0.09  UA: showed 0-2 WBC, few bacteria, yeast present --> cukltures show Candida  CXR: negative acute  SP Diflucan (4/21)  Cont to monitor    Multiple drug allergies- (present on admission)  Assessment & Plan  Extensive list noted    Persistent atrial fibrillation (HCC)- (present on admission)  Assessment & Plan  S/P PPM  Cont Atenolol  Cont Xarelto    Mild episode of recurrent major depressive disorder (HCC)- (present on admission)  Assessment & Plan  Cont Zoloft    Hypothyroidism- (present on admission)  Assessment & Plan  TSH: 0.18  FT4: 1.53  On Synthroid -- dose decreased recently by Dr. Crowell  Needs outpatient follow up

## 2024-04-21 NOTE — FLOWSHEET NOTE
04/21/24 0824   Events/Summary/Plan   Events/Summary/Plan spot check done pt on room air at this time doing well, dpi and pep done   Vital Signs   Pulse 89   Respiration 16   Pulse Oximetry 93 %   $ Pulse Oximetry (Spot Check) Yes   Respiratory Assessment   Respiratory Pattern Within Normal Limits   Level of Consciousness Alert   Chest Exam   Work Of Breathing / Effort Within Normal Limits   Breath Sounds   RUL Breath Sounds Clear   RML Breath Sounds Clear   RLL Breath Sounds Clear;Diminished   DO Breath Sounds Clear   LLL Breath Sounds Clear;Diminished   Secretions   Cough Strong;Dry;Non Productive   How Sputum Obtained Spontaneous   Sputum Amount Unable to Evaluate   Sputum Color Unable to Evaluate   Sputum Consistency Unable to Evaluate   Oxygen   O2 (LPM) 0   FiO2% 21 %   O2 Delivery Device Room air w/o2 available

## 2024-04-22 ENCOUNTER — APPOINTMENT (OUTPATIENT)
Dept: SPEECH THERAPY | Facility: REHABILITATION | Age: 84
DRG: 291 | End: 2024-04-22
Attending: GENERAL PRACTICE
Payer: MEDICARE

## 2024-04-22 ENCOUNTER — APPOINTMENT (OUTPATIENT)
Dept: RADIOLOGY | Facility: REHABILITATION | Age: 84
DRG: 291 | End: 2024-04-22
Attending: HOSPITALIST
Payer: MEDICARE

## 2024-04-22 ENCOUNTER — APPOINTMENT (OUTPATIENT)
Dept: INPATIENT REHAB | Facility: REHABILITATION | Age: 84
DRG: 291 | End: 2024-04-22
Payer: MEDICARE

## 2024-04-22 ENCOUNTER — APPOINTMENT (OUTPATIENT)
Dept: OCCUPATIONAL THERAPY | Facility: REHABILITATION | Age: 84
DRG: 291 | End: 2024-04-22
Attending: PHYSICAL MEDICINE & REHABILITATION
Payer: MEDICARE

## 2024-04-22 ENCOUNTER — APPOINTMENT (OUTPATIENT)
Dept: PHYSICAL THERAPY | Facility: REHABILITATION | Age: 84
DRG: 291 | End: 2024-04-22
Attending: PHYSICAL MEDICINE & REHABILITATION
Payer: MEDICARE

## 2024-04-22 LAB
ANION GAP SERPL CALC-SCNC: 11 MMOL/L (ref 7–16)
APPEARANCE UR: CLEAR
BASOPHILS # BLD AUTO: 0.5 % (ref 0–1.8)
BASOPHILS # BLD: 0.07 K/UL (ref 0–0.12)
BILIRUB UR QL STRIP.AUTO: NEGATIVE
BUN SERPL-MCNC: 33 MG/DL (ref 8–22)
CALCIUM SERPL-MCNC: 9.1 MG/DL (ref 8.5–10.5)
CHLORIDE SERPL-SCNC: 104 MMOL/L (ref 96–112)
CO2 SERPL-SCNC: 23 MMOL/L (ref 20–33)
COLOR UR: YELLOW
CREAT SERPL-MCNC: 0.84 MG/DL (ref 0.5–1.4)
EOSINOPHIL # BLD AUTO: 0.41 K/UL (ref 0–0.51)
EOSINOPHIL NFR BLD: 2.8 % (ref 0–6.9)
ERYTHROCYTE [DISTWIDTH] IN BLOOD BY AUTOMATED COUNT: 48.2 FL (ref 35.9–50)
GAMMA INTERFERON BACKGROUND BLD IA-ACNC: 0.04 IU/ML
GFR SERPLBLD CREATININE-BSD FMLA CKD-EPI: 68 ML/MIN/1.73 M 2
GLUCOSE SERPL-MCNC: 144 MG/DL (ref 65–99)
GLUCOSE UR STRIP.AUTO-MCNC: NEGATIVE MG/DL
HCT VFR BLD AUTO: 37.6 % (ref 37–47)
HGB BLD-MCNC: 12.2 G/DL (ref 12–16)
IMM GRANULOCYTES # BLD AUTO: 0.11 K/UL (ref 0–0.11)
IMM GRANULOCYTES NFR BLD AUTO: 0.7 % (ref 0–0.9)
KETONES UR STRIP.AUTO-MCNC: NEGATIVE MG/DL
LEUKOCYTE ESTERASE UR QL STRIP.AUTO: NEGATIVE
LYMPHOCYTES # BLD AUTO: 3.03 K/UL (ref 1–4.8)
LYMPHOCYTES NFR BLD: 20.4 % (ref 22–41)
M TB IFN-G BLD-IMP: NEGATIVE
M TB IFN-G CD4+ BCKGRND COR BLD-ACNC: 0 IU/ML
MCH RBC QN AUTO: 31.7 PG (ref 27–33)
MCHC RBC AUTO-ENTMCNC: 32.4 G/DL (ref 32.2–35.5)
MCV RBC AUTO: 97.7 FL (ref 81.4–97.8)
MICRO URNS: NORMAL
MITOGEN IGNF BCKGRD COR BLD-ACNC: >10 IU/ML
MONOCYTES # BLD AUTO: 0.89 K/UL (ref 0–0.85)
MONOCYTES NFR BLD AUTO: 6 % (ref 0–13.4)
NEUTROPHILS # BLD AUTO: 10.35 K/UL (ref 1.82–7.42)
NEUTROPHILS NFR BLD: 69.6 % (ref 44–72)
NITRITE UR QL STRIP.AUTO: NEGATIVE
NRBC # BLD AUTO: 0 K/UL
NRBC BLD-RTO: 0 /100 WBC (ref 0–0.2)
PH UR STRIP.AUTO: 6.5 [PH] (ref 5–8)
PLATELET # BLD AUTO: 295 K/UL (ref 164–446)
PMV BLD AUTO: 10.2 FL (ref 9–12.9)
POTASSIUM SERPL-SCNC: 4.4 MMOL/L (ref 3.6–5.5)
PROT UR QL STRIP: NEGATIVE MG/DL
QFT TB2 - NIL TBQ2: 0.01 IU/ML
RBC # BLD AUTO: 3.85 M/UL (ref 4.2–5.4)
RBC UR QL AUTO: NEGATIVE
SODIUM SERPL-SCNC: 138 MMOL/L (ref 135–145)
SP GR UR STRIP.AUTO: 1.02
UROBILINOGEN UR STRIP.AUTO-MCNC: 0.2 MG/DL
WBC # BLD AUTO: 14.9 K/UL (ref 4.8–10.8)

## 2024-04-22 PROCEDURE — 94640 AIRWAY INHALATION TREATMENT: CPT

## 2024-04-22 PROCEDURE — 94760 N-INVAS EAR/PLS OXIMETRY 1: CPT

## 2024-04-22 PROCEDURE — 71045 X-RAY EXAM CHEST 1 VIEW: CPT

## 2024-04-22 PROCEDURE — 97129 THER IVNTJ 1ST 15 MIN: CPT

## 2024-04-22 PROCEDURE — A9270 NON-COVERED ITEM OR SERVICE: HCPCS | Performed by: PHYSICAL MEDICINE & REHABILITATION

## 2024-04-22 PROCEDURE — 81003 URINALYSIS AUTO W/O SCOPE: CPT

## 2024-04-22 PROCEDURE — A9270 NON-COVERED ITEM OR SERVICE: HCPCS | Performed by: GENERAL PRACTICE

## 2024-04-22 PROCEDURE — 85025 COMPLETE CBC W/AUTO DIFF WBC: CPT

## 2024-04-22 PROCEDURE — 99232 SBSQ HOSP IP/OBS MODERATE 35: CPT | Performed by: HOSPITALIST

## 2024-04-22 PROCEDURE — 97535 SELF CARE MNGMENT TRAINING: CPT

## 2024-04-22 PROCEDURE — 700102 HCHG RX REV CODE 250 W/ 637 OVERRIDE(OP): Performed by: GENERAL PRACTICE

## 2024-04-22 PROCEDURE — 99232 SBSQ HOSP IP/OBS MODERATE 35: CPT | Performed by: PHYSICAL MEDICINE & REHABILITATION

## 2024-04-22 PROCEDURE — 700102 HCHG RX REV CODE 250 W/ 637 OVERRIDE(OP): Performed by: PHYSICAL MEDICINE & REHABILITATION

## 2024-04-22 PROCEDURE — 97110 THERAPEUTIC EXERCISES: CPT

## 2024-04-22 PROCEDURE — 36415 COLL VENOUS BLD VENIPUNCTURE: CPT

## 2024-04-22 PROCEDURE — 770010 HCHG ROOM/CARE - REHAB SEMI PRIVAT*

## 2024-04-22 PROCEDURE — 97530 THERAPEUTIC ACTIVITIES: CPT | Mod: CQ

## 2024-04-22 PROCEDURE — 97116 GAIT TRAINING THERAPY: CPT | Mod: CQ

## 2024-04-22 PROCEDURE — 97130 THER IVNTJ EA ADDL 15 MIN: CPT

## 2024-04-22 PROCEDURE — 700102 HCHG RX REV CODE 250 W/ 637 OVERRIDE(OP): Performed by: HOSPITALIST

## 2024-04-22 PROCEDURE — 97110 THERAPEUTIC EXERCISES: CPT | Mod: CQ

## 2024-04-22 PROCEDURE — 80048 BASIC METABOLIC PNL TOTAL CA: CPT

## 2024-04-22 PROCEDURE — 97602 WOUND(S) CARE NON-SELECTIVE: CPT

## 2024-04-22 PROCEDURE — A9270 NON-COVERED ITEM OR SERVICE: HCPCS | Performed by: HOSPITALIST

## 2024-04-22 PROCEDURE — 97530 THERAPEUTIC ACTIVITIES: CPT

## 2024-04-22 RX ORDER — ATENOLOL 50 MG/1
50 TABLET ORAL EVERY EVENING
Qty: 30 TABLET | Refills: 2 | Status: ON HOLD | OUTPATIENT
Start: 2024-04-22 | End: 2024-05-18

## 2024-04-22 RX ORDER — MIRTAZAPINE 15 MG/1
15 TABLET, FILM COATED ORAL
Qty: 30 TABLET | Refills: 2 | Status: SHIPPED | OUTPATIENT
Start: 2024-04-22 | End: 2024-05-04

## 2024-04-22 RX ORDER — MONTELUKAST SODIUM 10 MG/1
10 TABLET ORAL NIGHTLY
Qty: 30 TABLET | Refills: 2 | Status: SHIPPED | OUTPATIENT
Start: 2024-04-22 | End: 2024-05-04

## 2024-04-22 RX ORDER — LEVOTHYROXINE SODIUM 0.03 MG/1
37.5 TABLET ORAL
Qty: 45 TABLET | Refills: 2 | Status: SHIPPED | OUTPATIENT
Start: 2024-04-22 | End: 2024-05-04

## 2024-04-22 RX ADMIN — VIBEGRON 75 MG: 75 TABLET, FILM COATED ORAL at 09:00

## 2024-04-22 RX ADMIN — MIRTAZAPINE 15 MG: 15 TABLET, ORALLY DISINTEGRATING ORAL at 22:16

## 2024-04-22 RX ADMIN — RIVAROXABAN 15 MG: 15 TABLET, FILM COATED ORAL at 17:12

## 2024-04-22 RX ADMIN — POLYETHYLENE GLYCOL 3350 1 PACKET: 17 POWDER, FOR SOLUTION ORAL at 07:44

## 2024-04-22 RX ADMIN — FLUTICASONE FUROATE, UMECLIDINIUM BROMIDE AND VILANTEROL TRIFENATATE 1 PUFF: 100; 62.5; 25 POWDER RESPIRATORY (INHALATION) at 06:48

## 2024-04-22 RX ADMIN — ATENOLOL 50 MG: 25 TABLET ORAL at 22:15

## 2024-04-22 RX ADMIN — SENNOSIDES AND DOCUSATE SODIUM 1 TABLET: 50; 8.6 TABLET ORAL at 07:44

## 2024-04-22 RX ADMIN — SERTRALINE HYDROCHLORIDE 50 MG: 50 TABLET ORAL at 07:45

## 2024-04-22 RX ADMIN — LEVOTHYROXINE SODIUM 37.5 MCG: 0.07 TABLET ORAL at 05:21

## 2024-04-22 RX ADMIN — MONTELUKAST 10 MG: 10 TABLET, FILM COATED ORAL at 22:16

## 2024-04-22 ASSESSMENT — ENCOUNTER SYMPTOMS
SHORTNESS OF BREATH: 0
NERVOUS/ANXIOUS: 0
CHILLS: 0
DIARRHEA: 0
ABDOMINAL PAIN: 0
FEVER: 0
NAUSEA: 0
VOMITING: 0

## 2024-04-22 ASSESSMENT — ACTIVITIES OF DAILY LIVING (ADL)
TOILETING_LEVEL_OF_ASSIST_DESCRIPTION: GRAB BAR
TOILETING_LEVEL_OF_ASSIST: ABLE TO COMPLETE TOILETING WITHOUT ASSIST
TUB_SHOWER_TRANSFER_DESCRIPTION: GRAB BAR;SHOWER BENCH
TOILET_TRANSFER_DESCRIPTION: GRAB BAR
BED_CHAIR_WHEELCHAIR_TRANSFER_DESCRIPTION: INITIAL PREPARATION FOR TASK;SUPERVISION FOR SAFETY
SHOWER_TRANSFER_LEVEL_OF_ASSIST: REQUIRES SUPERVISION WITH SHOWER TRANSFER

## 2024-04-22 ASSESSMENT — PAIN DESCRIPTION - PAIN TYPE: TYPE: ACUTE PAIN

## 2024-04-22 ASSESSMENT — BRIEF INTERVIEW FOR MENTAL STATUS (BIMS)
ASKED TO RECALL BLUE: YES, NO CUE REQUIRED
INITIAL REPETITION OF BED BLUE SOCK - FIRST ATTEMPT: 3
INITIAL REPETITION OF BED BLUE SOCK - FIRST ATTEMPT: 3
WHAT YEAR IS IT: CORRECT
WHAT MONTH IS IT: MISSED BY 6 DAYS TO 1 MONTH
WHAT MONTH IS IT: MISSED BY 6 DAYS TO 1 MONTH
BIMS SUMMARY SCORE: 12
WHAT DAY OF THE WEEK IS IT: INCORRECT
ASKED TO RECALL BED: YES, NO CUE REQUIRED
ASKED TO RECALL SOCK: YES, NO CUE REQUIRED
ASKED TO RECALL BED: YES, AFTER CUEING (A PIECE OF FURNITURE")"
ASKED TO RECALL BLUE: YES, NO CUE REQUIRED
WHAT YEAR IS IT: CORRECT
WHAT DAY OF THE WEEK IS IT: INCORRECT
ASKED TO RECALL SOCK: YES, AFTER CUEING (SOMETHING TO WEAR")"
BIMS SUMMARY SCORE: 12

## 2024-04-22 ASSESSMENT — GAIT ASSESSMENTS
ASSISTIVE DEVICE: FRONT WHEEL WALKER
DISTANCE (FEET): 100
GAIT LEVEL OF ASSIST: STANDBY ASSIST

## 2024-04-22 NOTE — PROGRESS NOTES
NURSING DAILY NOTE    Name: Donte Magaña   Date of Admission: 4/12/2024   Admitting Diagnosis: Acute diastolic heart failure (HCC)  Attending Physician: Lisa Mercedes D.o.  Allergies: Amiodarone, Bactrim, Cipro xr, Metoprolol, Morphine, Phytoplex z-guard [petrolatum-zinc oxide], Pseudoephedrine, Qvar [beclomethasone dipropionate], Vibramycin, Atorvastatin calcium-polysorbate 80, Augmentin, Diltiazem, Flecainide, Keflex, Mucinex, Tramadol, Atorvastatin, and Tape    Safety  Patient Assist  min-mod  Patient Precautions  Fall Risk  Precaution Comments  Pacemaker  Bed Transfer Status  Standby Assist  Toilet Transfer Status   Supervised  Assistive Devices  Rails, Wheelchair  Oxygen  Room air w/o2 available  Diet/Therapeutic Dining  Current Diet Order   Procedures    Diet Order Diet: Regular (cruch meds that can be crushe in puree. float others one at a time,  up in wheel chair and in dining room for all meals)     Pill Administration  whole, floated, and one at a time with pudding  Agitated Behavioral Scale     ABS Level of Severity       Fall Risk  Has the patient had a fall this admission?   No  Renetta Chauhan Fall Risk Scoring  22, HIGH RISK  Fall Risk Safety Measures  bed alarm and chair alarm    Vitals  Temperature: 36.6 °C (97.9 °F)  Temp src: Oral  Pulse: 80  Respiration: 18  Blood Pressure : 127/66  Blood Pressure MAP (Calculated): 86 MM HG  BP Location: Left, Upper Arm  Patient BP Position: Supine     Oxygen  Pulse Oximetry: 95 %  O2 (LPM): 0  FiO2%: 21 %  O2 Delivery Device: Room air w/o2 available    Bowel and Bladder  Last Bowel Movement  04/21/24  Stool Type  Type 3: Like a sausage, but with cracks on its surface  Bowel Device  Bathroom, Diaper  Continent  Bladder:     Bowel:    Bladder Function  Urine Void (mL):  (moderate void)  Number of Times Voided: 1  Urine Color: Unable To Evaluate  Urine Clarity: Clear  Urine Odor: Malodorous  Number of  Times Incontinent of Urine: 0  Straight Catheter: 400 ml  Genitourinary Assessment   Bladder Assessment (WDL):  WDL Except  Pruett Catheter: Not Applicable  Urine Color: Unable To Evaluate  Urine Clarity: Clear  Urine Odor: Malodorous  Number of Bladder Accidents: 1  Total Number of Bladder of Accidents in Last 7 Days: 6  Number of Times Incontinent of Urine: 0  Bladder Device: Bathroom  Bladder Scan: Post Void  $ Bladder Scan Results (mL): 150    Skin  Ashutosh Score   15  Sensory Interventions   Bed Types: Low Airloss  Skin Preventative Measures: Pillows in Use for Support / Positioning  Moisture Interventions  Moisturizers/Barriers:  (Cavilon)      Pain  Pain Rating Scale  0 - No Pain  Pain Location     Pain Location Orientation     Pain Interventions   Declines    ADLs    Bathing   Shower, Staff  Linen Change   Partial  Personal Hygiene  Change Edei Pads, Moist Edie Wipes, Perineal Care  Chlorhexidine Bath      Oral Care  Brushed Teeth  Teeth/Dentures     Shave     Nutrition Percentage Eaten  Dinner, Between 50-75% Consumed  Environmental Precautions  Treaded Slipper Socks on Patient  Patient Turns/Positioning  Patient Turns Self from Side to Side  Patient Turns Assistance/Tolerance     Bed Positions  Bed Controls On, Bed Locked  Head of Bed Elevated  Self regulated      Psychosocial/Neurologic Assessment  Psychosocial Assessment  Psychosocial (WDL):  Within Defined Limits  Patient Behaviors: Fatigue, Forgetful  Neurologic Assessment  Neuro (WDL): Exceptions to WDL  Level of Consciousness: Alert  Orientation Level: Oriented to person, Oriented to situation, Disoriented to place, Disoriented to time  Cognition: Confused in conversation, Follows commands  Speech: Delayed responses, Clear  Pupil Assesment: No  R Pupil Size (mm): 3  R Pupil Shape / Description: Round  R Pupil Reaction: Brisk  L Pupil Size (mm): 3  L Pupil Shape / Description: Round  L Pupil Reaction: Brisk  EENT (WDL):  WDL Except    Cardio/Pulmonary  Assessment  Edema      Respiratory Breath Sounds  RUL Breath Sounds: Clear  RML Breath Sounds: Clear  RLL Breath Sounds: Diminished  DO Breath Sounds: Clear  LLL Breath Sounds: Diminished  Cardiac Assessment   Cardiac (WDL):  WDL Except (A-fib, HTN)

## 2024-04-22 NOTE — CARE PLAN
Problem: OT Long Term Goals  Goal: LTG-By discharge, patient will complete basic self care tasks at SPV-Mod I level using DME/AE PRN.  Outcome: Met  Goal: LTG-By discharge, patient will perform bathroom transfers at SPV-Mod I level using DME/AE PRN.  Outcome: Met

## 2024-04-22 NOTE — THERAPY
"Speech Language Pathology  Discharge Summary     Patient Name:NAME@  Age:  83 y.o., Sex:  female  Medical Record #:MRN@  Today's Date: 4/22/2024     Precautions  Precautions: Fall Risk  Comments: Pacemaker    Subjective    Patient agreeable to therapy.     Objective       04/22/24 1301   Treatment Charges   SLP Cognitive Skill Development First 15 Minutes 1   SLP Cognitive Skill Development Additional 15 Minutes 1   SLP Total Time Spent   SLP Individual Total Time Spent (Mins) 30   Cognition   Functional Memory Activities Moderate (3)   Cognitive Pattern Assessment   Cognitive Pattern Assessment Used BIMS   Brief Interview for Mental Status (BIMS)   Repetition of Three Words (First Attempt) 3   Temporal Orientation: Year Correct   Temporal Orientation: Month Missed by 6 days to 1 month   Temporal Orientation: Day Incorrect   Recall: \"Sock\" Yes, after cueing (\"something to wear\")   Recall: \"Blue\" Yes, no cue required   Recall: \"Bed\" Yes, no cue required   BIMS Summary Score 12   Confusion Assessment Method (CAM)   Is there evidence of an acute change in mental status from the patient's baseline? No   Inattention Behavior present, fluctuates (comes and goes, changes in severity)   Disorganized thinking Behavior not present   Altered level of consciousness Behavior not present   Discharge Summary    Progress since Admit Patient made small gains toward cognitive linguistic goals.  She currently needs repeated exposures, use of compensatory memory strategies and benefits from spaced retrieval traning, and is able to carry new information after 10 min delay with mod to max A.  She is able to recall after 5 min delays with min A.   Discharge Location  Assisted Living   Patient Discharging with Assist of Other (See Comments)  (staff assistance.   Patient will need assistance with medication managment.)   Level of Supervision Required Intermittent Supervision   Recommended Services Upon Discharge Home Health Speech Therapy "   Long Term Goals Met Patient completed a MBSS on 4/15/24 and was found to have very mild oral dysphagia but overall safe and functional swallow with regular diet textures and thin liquids.   Long Term Goals Not Met patient will complete functional problem solving and recall information with 80% accuracy provided min cues.   Reason(s) for Goals Not Met Impaired initiation, impaired carry over, impaired functional cognition.   Criteria for Termination of Services Maximum Function Achieved for Inpatient Rehabilitation   Discharge Instructions   Diet Regular (7);Thin (0)   Other Diet Instructions Crush those medications that can be crushed, and float in puree.  Otherwise floate whole one at a time in puree.   Supervision Recommend intermittent supervision.  Please refer to PT/OT recommendations for supervision needs when on feet and during actitivites.   Cognition / Communication Help patient improve independence for memory by giving him/her enough extra time to process.  Talk together about potential challenges to transfers and ambulation before performing them, and review strategies for safety.  Use RWAVS memory strategies to reinforce new learning.  R - repeat, repeat, repeat.   W - write it down or get it in writing.   A - associate the new information with something that you already know very well.   V - visualize it, practice in your mind's eye, when you aren't able to perform the action physically.  S - say it back, out loud.  This benefits you, as you repeat the information for practice. You also are seeking clarification from the educator, evaluating to see if all of the information was understood, and prompting feedback if needed.  And it slows down the exchange, buying extra time to process the information.         Assessment    Patient was able to recall 5/5 unrelated words after 2 and 5 min dealys with repeated exposures and use of RWAVS memory strategies.   Recalled 3/5 after 10 min delay with min cues to  recall remainder.  Patient needed mod to max cues to recall word list after 15 min delay.    Strengths: Willingly participates in therapeutic activities, Pleasant and cooperative, Able to follow instructions, Effective communication skills  Barriers: Aspiration risk, Difficulty following instructions    Plan    Patient is planning to discharge to [facility] facility.  She will need assistance with medication management and recall of safety sequencing.    Passport items completed:      Speech Therapy Problems (Active)       Problem: Memory STGs       Dates: Start:  04/14/24         Goal: STG-Within one week, patient will implement functional memory strategies with 80% accuracy provided mod cues.        Dates: Start:  04/16/24    Expected End:  04/23/24         Goal Note filed on 04/16/24 1550 by Janessa Alvarado, Student       Patient needed max A to recall information.                  Problem: Problem Solving STGs       Dates: Start:  04/16/24         Goal: STG-Within one week, patient will complete sustained/alternating/selective attention tasks with 80% accuracy provided mod cues.       Dates: Start:  04/16/24    Expected End:  04/23/24         Goal Note filed on 04/16/24 1550 by Janessa Alvarado, Student       Patient needs mod to max A to complete problem solving and attention tasks.                  Problem: Speech/Swallowing LTGs       Dates: Start:  04/14/24         Goal: LTG-By discharge, patient will safely swallow textures/consistencies determined by MBSS to be the least restrictive with no overt s/sx of aspiration, no decline in respiratory status.       Dates: Start:  04/14/24    Expected End:  04/23/24            Goal: LTG-By discharge, patient will complete functional problem solving and recall information with 80% accuracy provided min cues.        Dates: Start:  04/16/24    Expected End:  04/23/24               Problem: Swallowing STGs       Dates: Start:  04/14/24

## 2024-04-22 NOTE — THERAPY
Occupational Therapy  Daily Treatment     Patient Name: Donte Magaña  Age:  83 y.o., Sex:  female  Medical Record #: [id number]  Today's Date: 4/22/2024     Precautions  Precautions: (P) Fall Risk  Comments: Pacemaker         Subjective    Patient was seated in w/c and agreeable to OT.       Objective       04/22/24 1231   OT Charge Group   OT Therapy Activity (Units) 1   OT Therapeutic Exercise (Units) 1   OT Total Time Spent   OT Individual Total Time Spent (Mins) 30   Precautions   Precautions Fall Risk   Sitting Upper Body Exercises   Upper Extremity Bike Minutes / Rest Breaks (See Comments)  (UB motomed cycle gear 3 x 10 minutes)   Interdisciplinary Plan of Care Collaboration   Patient Position at End of Therapy Seated;Call Light within Reach;Tray Table within Reach;Phone within Reach;Self Releasing Lap Belt Applied;Chair Alarm On     Mild dynamic standing balance challenge with single UE support while using other UE to hold and toss balls playing ladderball.      Assessment    Able to play ladderball with SBA with single UE support.  No complaints during use of motomed.  Strengths: Able to follow instructions, Independent prior level of function, Motivated for self care and independence, Willingly participates in therapeutic activities  Barriers: Decreased endurance, Fatigue, Generalized weakness, Hypotension, Impaired activity tolerance    Plan    D/C tomorrow    Occupational Therapy Goals (Active)       Problem: Dressing       Dates: Start:  04/17/24         Goal: STG-Within one week, patient will dress LB with supervision/mod I       Dates: Start:  04/17/24    Expected End:  04/23/24               Problem: Functional Transfers       Dates: Start:  04/17/24         Goal: STG-Within one week, patient will transfer to toilet with supervision/mod I       Dates: Start:  04/17/24    Expected End:  04/23/24               Problem: Toileting       Dates: Start:  04/17/24         Goal: STG-Within one week, patient  will complete toileting tasks with supervision/mod I       Dates: Start:  04/17/24    Expected End:  04/23/24

## 2024-04-22 NOTE — CARE PLAN
"The patient is Stable - Low risk of patient condition declining or worsening    Shift Goals  Clinical Goals: Safety  Patient Goals: Participate in therapy    Problem: Skin Integrity  Goal: Skin integrity is maintained or improved  Outcome: Progressing  Note:   Ashutosh Score: 15    Patient's skin remains intact and free from new or accidental injury this shift; no s/s of infection. RN wound protocol checked. Patient is on low air loss mattress. Encouraged hydration and educated about the importance of nutrition to keep skin integrity. Will continue to monitor.      Problem: Fall Risk - Rehab  Goal: Patient will remain free from falls  Outcome: Progressing  Note: Renetta Chauhan Fall risk Assessment Score: 22    High fall risk Interventions   - Bed and strip alarm   - Yellow sign by the door   - Yellow wrist band \"Fall risk\"  - Room near to the nurse station  - Do not leave patient unattended in the bathroom  - Fall risk education provided     "

## 2024-04-22 NOTE — FLOWSHEET NOTE
04/22/24 0647   Events/Summary/Plan   Events/Summary/Plan mdi   Vital Signs   Pulse 80   Respiration 16   Pulse Oximetry 96 %   $ Pulse Oximetry (Spot Check) Yes   Respiratory Assessment   Level of Consciousness Alert   Chest Exam   Work Of Breathing / Effort Within Normal Limits   Breath Sounds   RUL Breath Sounds Clear   RML Breath Sounds Clear   RLL Breath Sounds Diminished   DO Breath Sounds Clear   LLL Breath Sounds Diminished   Oxygen   O2 (LPM) 1   O2 Delivery Device Silicone Nasal Cannula

## 2024-04-22 NOTE — PROGRESS NOTES
NURSING DAILY NOTE    Name: Donte Magaña   Date of Admission: 4/12/2024   Admitting Diagnosis: Acute diastolic heart failure (HCC)  Attending Physician: Lisa Mercedes D.o.  Allergies: Amiodarone, Bactrim, Cipro xr, Metoprolol, Morphine, Phytoplex z-guard [petrolatum-zinc oxide], Pseudoephedrine, Qvar [beclomethasone dipropionate], Vibramycin, Atorvastatin calcium-polysorbate 80, Augmentin, Diltiazem, Flecainide, Keflex, Mucinex, Tramadol, Atorvastatin, and Tape    Safety  Patient Assist  min- mod  Patient Precautions  Fall Risk  Precaution Comments  Pacemaker  Bed Transfer Status  Standby Assist  Toilet Transfer Status   Supervised  Assistive Devices  Rails, Wheelchair  Oxygen  Room air w/o2 available  Diet/Therapeutic Dining  Current Diet Order   Procedures    Diet Order Diet: Regular (cruch meds that can be crushe in puree. float others one at a time,  up in wheel chair and in dining room for all meals)     Pill Administration  whole, floated, and one at a time   Agitated Behavioral Scale     ABS Level of Severity       Fall Risk  Has the patient had a fall this admission?   No  Renetta Chauhan Fall Risk Scoring  22, HIGH RISK  Fall Risk Safety Measures  bed alarm, poor balance, and low vision/ hearing    Vitals  Temperature: 36.7 °C (98 °F)  Temp src: Oral  Pulse: 85  Respiration: 18  Blood Pressure : 110/50  Blood Pressure MAP (Calculated): 70 MM HG  BP Location: Left, Upper Arm  Patient BP Position: Sitting     Oxygen  Pulse Oximetry: 94 %  O2 (LPM): 0  FiO2%: 21 %  O2 Delivery Device: Room air w/o2 available    Bowel and Bladder  Last Bowel Movement  04/21/24  Stool Type  Type 4: Like a sausage or snake, smooth and soft  Bowel Device  Bathroom, Diaper  Continent  Bladder:     Bowel:    Bladder Function  Urine Void (mL):  (moderate void)  Number of Times Voided: 1  Urine Color: Unable To Evaluate  Urine Clarity: Clear  Urine Odor: Malodorous  Number  of Times Incontinent of Urine: 0  Straight Catheter: 400 ml  Genitourinary Assessment   Bladder Assessment (WDL):  WDL Except  Pruett Catheter: Not Applicable  Urine Color: Unable To Evaluate  Urine Clarity: Clear  Urine Odor: Malodorous  Number of Bladder Accidents: 1  Total Number of Bladder of Accidents in Last 7 Days: 6  Number of Times Incontinent of Urine: 0  Bladder Device: Bathroom  Bladder Scan: Post Void  $ Bladder Scan Results (mL): 150    Skin  Ashutosh Score   15  Sensory Interventions   Bed Types: Low Airloss  Skin Preventative Measures: Pillows in Use for Support / Positioning  Moisture Interventions  Moisturizers/Barriers:  (Cavilon)      Pain  Pain Rating Scale  0 - No Pain  Pain Location     Pain Location Orientation     Pain Interventions   Declines    ADLs    Bathing   Shower, Staff  Linen Change   Partial  Personal Hygiene  Change Edie Pads, Moist Edie Wipes, Perineal Care  Chlorhexidine Bath      Oral Care  Brushed Teeth  Teeth/Dentures     Shave     Nutrition Percentage Eaten  *  * Meal *  *, Lunch, Between % Consumed  Environmental Precautions  Treaded Slipper Socks on Patient  Patient Turns/Positioning  Patient Turns Self from Side to Side  Patient Turns Assistance/Tolerance     Bed Positions  Bed Controls On, Bed Locked  Head of Bed Elevated  Self regulated      Psychosocial/Neurologic Assessment  Psychosocial Assessment  Psychosocial (WDL):  Within Defined Limits  Patient Behaviors: Fatigue, Forgetful  Neurologic Assessment  Neuro (WDL): Exceptions to WDL  Level of Consciousness: Alert  Orientation Level: Oriented to person, Oriented to situation, Disoriented to place, Disoriented to time  Cognition: Confused in conversation, Follows commands  Speech: Delayed responses, Clear  Pupil Assesment: No  R Pupil Size (mm): 3  R Pupil Shape / Description: Round  R Pupil Reaction: Brisk  L Pupil Size (mm): 3  L Pupil Shape / Description: Round  L Pupil Reaction: Brisk  EENT (WDL):  WDL  Except    Cardio/Pulmonary Assessment  Edema      Respiratory Breath Sounds  RUL Breath Sounds: Clear  RML Breath Sounds: Clear  RLL Breath Sounds: Clear, Diminished  DO Breath Sounds: Clear  LLL Breath Sounds: Clear, Diminished  Cardiac Assessment   Cardiac (WDL):  WDL Except (A-fib, HTN)

## 2024-04-22 NOTE — WOUND TEAM
[facility]  Inpatient Services  Wound and Skin Care Follow-up    Admission Date: 4/12/2024     Last order of IP CONSULT TO WOUND CARE was found on 4/15/2024 from Hospital Encounter on 4/12/2024     HPI, PMH, SH: Reviewed    Past Surgical History:   Procedure Laterality Date    NH COMBINED ANT/POST COLPORRHAPHY  1/14/2020    Procedure: COLPORRHAPHY, COMBINED ANTEROPOSTERIOR - PERINEOPLASTY;  Surgeon: Waqas Robin M.D.;  Location: SURGERY SAME DAY [facility];  Service: Gynecology    NH LAP,DIAGNOSTIC ABDOMEN  1/14/2020    Procedure: PELVISCOPY;  Surgeon: Waqas Robin M.D.;  Location: SURGERY SAME DAY [facility];  Service: Gynecology    ENTEROCELE REPAIR  1/14/2020    Procedure: REPAIR, ENTEROCELE;  Surgeon: Waqas Robin M.D.;  Location: SURGERY SAME DAY [facility];  Service: Gynecology    BLADDER SLING FEMALE  1/14/2020    Procedure: BLADDER SLING, FEMALE - TOT;  Surgeon: Waqas Robin M.D.;  Location: SURGERY SAME DAY [facility];  Service: Gynecology    VAGINAL SUSPENSION  1/14/2020    Procedure: COLPOPEXY - SACROSPINOUS VAULT SUSPENSION;  Surgeon: Waqas Robin M.D.;  Location: SURGERY SAME DAY [facility];  Service: Gynecology    SALPINGO OOPHORECTOMY Bilateral 1/14/2020    Procedure: SALPINGO-OOPHORECTOMY;  Surgeon: Waqas Robin M.D.;  Location: SURGERY SAME DAY [facility];  Service: Gynecology    IRRIGATION & DEBRIDEMENT ORTHO Right 2/14/2019    Procedure: IRRIGATION & DEBRIDEMENT ORTHO-HIP WOUND ;  Surgeon: Vladimir Lee M.D.;  Location: SURGERY [facility];  Service: Orthopedics    HIP ARTH ANTERIOR TOTAL Right 1/17/2019    Procedure: HIP ARTHROPLASTY ANTERIOR TOTAL;  Surgeon: Juan C Mercedes M.D.;  Location: SURGERY [facility];  Service: Orthopedics    PACEMAKER INSERTION  06/30/2018    Dual Chamber    KNEE ARTHROPLASTY TOTAL Right 6/23/2016    Procedure: KNEE ARTHROPLASTY TOTAL;  Surgeon: Heriberto Lozada M.D.;  Location: [facility];   Service:     KNEE ARTHROPLASTY TOTAL Left 5/28/2015    Procedure: KNEE ARTHROPLASTY TOTAL;  Surgeon: Heriberto Lozada M.D.;  Location: SURGERY [facility];  Service:     CATARACT PHACO WITH IOL Right 5/5/2015    Procedure: IOL OD - STANDARD;  Surgeon: Dmitry Bejarano M.D.;  Location: SURGERY [facility];  Service:     CATARACT PHACO WITH IOL  4/21/2015    Performed by Dmitry Bejarano M.D. at SURGERY [facility]    RECOVERY  11/30/2010    Performed by SURGERY, [facility] at SURGERY SAME DAY [facility]    COLONOSCOPY  2008    Normal    [facility]    ABDOMINAL HYSTERECTOMY TOTAL  April 15,1975    still has ovaries    OPEN REDUCTION      left ankle    OTHER      Removed pins from left ankle    OTHER CARDIAC SURGERY  12/2017 and 07/19/2018     Cardiac Ablation    TONSILLECTOMY AND ADENOIDECTOMY       Social History     Tobacco Use    Smoking status: Never    Smokeless tobacco: Never   Substance Use Topics    Alcohol use: No     No chief complaint on file.    Diagnosis: Acute respiratory failure (HCC) [J96.00]    Unit where seen by Wound Team: RH32/01     WOUND FOLLOW UP RELATED TO:  Buttocks and bilateral groin       WOUND TEAM PLAN OF CARE - Frequency of Follow-up:   Nursing to follow dressing orders written for wound care. Contact wound team if area fails to progress, deteriorates or with any questions/concerns if something comes up before next scheduled follow up (See below as to whether wound is following and frequency of wound follow up)  Dressing changes by wound team:                   Weekly - Mondays    WOUND HISTORY:       Wounds found POA       WOUND ASSESSMENT/LDA  Wound 04/07/24 Pressure Injury Coccyx;Buttocks POA DTI with surrounding MASD (Active)   Date First Assessed/Time First Assessed: 04/07/24 2200   Present on Original Admission: Yes  Hand Hygiene Completed: Yes  Primary Wound Type: Pressure Injury  Location: Coccyx;Buttocks  Wound Description (Comments): POA DTI with  surrounding MASD      Assessments 4/22/2024  3:00 PM   Wound Image     Site Assessment Pink;Purple;Dry   Periwound Assessment Pink;Red   Margins Undefined edges   Closure Open to air   Drainage Amount None   Treatments Cleansed;Site care   Wound Cleansing Foam Cleanser/Washcloth   Periwound Protectant Cavilon Advanced   Dressing Status Open to Air   Dressing Changed Reapplied   Dressing Cleansing/Solutions Not Applicable   Dressing Options Cavilon Advanced   Dressing Change/Treatment Frequency By Wound Team Only   NEXT Dressing Change/Treatment Date 04/29/24   NEXT Weekly Photo (Inpatient Only) 04/29/24   Wound Team Following Weekly   Pressure Injury Stage Deep Tissue   WOUND NURSE ONLY - Time Spent with Patient (mins) 45       Wound Groin Groin moisture fissure (Active)   No Date First Assessed or Time First Assessed found.   Present on Original Admission: Yes  Location: Groin  Wound Description (Comments): Groin moisture fissure      Assessments 4/22/2024  3:00 PM   Wound Image      Site Assessment Pink;Dry   Periwound Assessment Pink   Margins Attached edges   Closure None   Drainage Amount None   Treatments Site care;Cleansed   Wound Cleansing Foam Cleanser/Washcloth   Periwound Protectant Cavilon Advanced   Dressing Status Open to Air   Dressing Changed Reapplied   Dressing Options Cavilon Advanced   Dressing Change/Treatment Frequency By Wound Team Only   NEXT Dressing Change/Treatment Date 04/29/24   NEXT Weekly Photo (Inpatient Only) 04/29/24   Wound Team Following Weekly   WOUND NURSE ONLY - Time Spent with Patient (mins) 45        Vascular:    MARY BETH:   No results found.    Lab Values:    Lab Results   Component Value Date/Time    WBC 14.9 (H) 04/22/2024 05:53 AM    WBC 9.3 07/29/2010 12:00 AM    RBC 3.85 (L) 04/22/2024 05:53 AM    RBC 4.57 07/29/2010 12:00 AM    HEMOGLOBIN 12.2 04/22/2024 05:53 AM    HEMATOCRIT 37.6 04/22/2024 05:53 AM    CREACTPROT 5.81 (H) 04/10/2024 01:55 PM    SEDRATEWES 20 04/10/2024  11:00 AM    HBA1C 5.3 02/13/2019 07:57 PM         Culture Results show:  No results found for this or any previous visit (from the past 720 hour(s)).    Pain Level/Medicated:  None, Tolerated without pain medication       INTERVENTIONS BY WOUND TEAM:  Chart and images reviewed. Discussed with bedside RN. All areas of concern (based on picture review, LDA review and discussion with bedside RN) have been thoroughly assessed. Documentation of areas based on significant findings. This RN in to assess patient. Performed standard wound care which includes appropriate positioning, dressing removal and non-selective debridement. Pictures and measurements obtained weekly if/when required.    Wound:  Groin - Bilateral  Preparation for Dressing removal: Dressing soaked with soap and water   Cleansed/Non-selectively Debrided with:  No rinse foam soap and Moist warm washcloth  Primary Dressing:  Cavillion Advanced  Secondary (Outer) Dressing: ROSANGELA     Wound:  Buttocks  Preparation for Dressing removal: Dressing soaked with soap and water   Cleansed/Non-selectively Debrided with:  No rinse foam soap and Moist warm washcloth  Primary Dressing:  Cavillion Advanced  Secondary (Outer) Dressing: ROSANGELA    Advanced Wound Care Discharge Planning  Number of Clinicians necessary to complete wound care: 2  Is patient requiring IV pain medications for dressing changes:  No   Length of time for dressing change 35 min. (This does not include chart review, pre-medication time, set up, clean up or time spent charting.)    Interdisciplinary consultation: Patient, Bedside RN (Ade),  Ciara, RN .  Pressure injury and staging reviewed with  DANNY Urbina .    EVALUATION / RATIONALE FOR TREATMENT:     Date:  04/22/24  Wound Status:  Wound progressing as expected    Groin MASD fissures appear to be resolved - will stop following.     Buttocks - reapplication of Cavillion Advanced. Cavillon Advanced used to protect skin from incontinence and moisture.  DANNY Booker and Dr. Harvey at bedside for skin assessment.     Date:  04/15/24  Wound Status:  Initial evaluation    Buttocks  - POA DTI with surrounding MASD. Cavillon Advanced used to protect skin from incontinence and moisture.    Bilateral Groin - Moisture fissures bilateral. Cavillon Advanced used to protect skin from moisture.     Heels - blanchable erythema, boggy. Float heels with pillows as patient already on low airloss mattress.     Education provided for patient to turn/change positions frequently. Patient verbalized understanding.          Goals: Steady decrease in wound area and depth weekly.    NURSING PLAN OF CARE ORDERS:  No new orders this visit    NUTRITION RECOMMENDATIONS   Wound Team Recommendations:  N/A     DIET ORDERS (From admission to next 24h)       Start     Ordered    04/15/24 1524  Diet Order Diet: Regular (cruch meds that can be crushe in puree. float others one at a time,  up in wheel chair and in dining room for all meals)  ALL MEALS        Question:  Diet:  Answer:  Regular  Comment:  cruch meds that can be crushe in puree. float others one at a time,  up in wheel chair and in dining room for all meals    04/15/24 1524    04/15/24 0743  Dietary Comment  ONCE        Comments: Modified barium swallow tray for 10:00 on 4/15/24    04/15/24 0742    04/12/24 1545  Supplements  ALL MEALS        Question Answer Comment   Which Supplement Ensure    Ensure: Ensure Plus Carton Strawberry and chocolate flavor only per pt request.       04/12/24 1544                    PREVENTATIVE INTERVENTIONS:   Q shift Ashutosh - performed per nursing policy  Q shift pressure point assessments - performed per nursing policy    Surface/Positioning  Low Airloss - Currently in Place    Offloading/Redistribution  Float Heels off Bed with Pillows - Currently in Place           Mobilization      Working with therapies      Anticipated discharge plans:  Home Health Care        Vac Discharge Needs:  Vac Discharge plan  is purely a recommendation from wound team and not a requirement for discharge unless otherwise stated by physician.  Not Applicable Pt not on a wound vac

## 2024-04-22 NOTE — FLOWSHEET NOTE
04/22/24 1020   Events/Summary/Plan   Events/Summary/Plan RA pulse ox check   Vital Signs   Pulse 82   Respiration 16   Pulse Oximetry 93 %   $ Pulse Oximetry (Spot Check) Yes   Oxygen   O2 Delivery Device Room air w/o2 available

## 2024-04-22 NOTE — PROGRESS NOTES
Hospital Medicine Daily Progress Note        Chief Complaint  Hypertension  Afib  Leukocytosis    Interval Problem Update  Discussed about her wbc's still elevated and have started some workup.  CXR noted today.    Review of Systems  Review of Systems   Constitutional:  Negative for chills and fever.   Respiratory:  Negative for shortness of breath.    Cardiovascular:  Negative for chest pain.   Gastrointestinal:  Negative for abdominal pain, diarrhea, nausea and vomiting.   Psychiatric/Behavioral:  The patient is not nervous/anxious.         Physical Exam  Temp:  [36.6 °C (97.9 °F)-36.7 °C (98.1 °F)] 36.7 °C (98.1 °F)  Pulse:  [80-85] 82  Resp:  [16-18] 16  BP: (110-130)/(50-66) 127/52  SpO2:  [92 %-96 %] 93 %    Physical Exam  Vitals and nursing note reviewed.   Constitutional:       Appearance: Normal appearance.   HENT:      Head: Atraumatic.   Eyes:      Conjunctiva/sclera: Conjunctivae normal.      Pupils: Pupils are equal, round, and reactive to light.   Cardiovascular:      Rate and Rhythm: Normal rate and regular rhythm.      Heart sounds: No murmur heard.  Pulmonary:      Effort: Pulmonary effort is normal.      Breath sounds: No stridor. No wheezing or rales.   Abdominal:      General: There is no distension.      Palpations: Abdomen is soft.      Tenderness: There is no abdominal tenderness.   Musculoskeletal:      Cervical back: Normal range of motion and neck supple.      Right lower leg: No edema.      Left lower leg: No edema.   Skin:     General: Skin is warm and dry.      Findings: No rash.   Neurological:      Mental Status: She is alert and oriented to person, place, and time.   Psychiatric:         Mood and Affect: Mood normal.         Behavior: Behavior normal.         Fluids    Intake/Output Summary (Last 24 hours) at 4/22/2024 1129  Last data filed at 4/22/2024 0900  Gross per 24 hour   Intake 820 ml   Output --   Net 820 ml       Laboratory  Recent Labs     04/21/24  0556 04/22/24  0597    WBC 13.0* 14.9*   RBC 3.85* 3.85*   HEMOGLOBIN 12.3 12.2   HEMATOCRIT 38.3 37.6   MCV 99.5* 97.7   MCH 31.9 31.7   MCHC 32.1* 32.4   RDW 49.0 48.2   PLATELETCT 307 295   MPV 10.1 10.2     Recent Labs     04/21/24  0556 04/22/24  0553   SODIUM 140 138   POTASSIUM 4.9 4.4   CHLORIDE 103 104   CO2 27 23   GLUCOSE 148* 144*   BUN 31* 33*   CREATININE 0.94 0.84   CALCIUM 9.2 9.1                   Assessment/Plan  Hypophosphatemia  Assessment & Plan  PO4: 2.1  Cont supplements  Cont to monitor    Acute respiratory failure (HCC)- (present on admission)  Assessment & Plan  Resolved (from [facility])  CT bilateral ground glass opacities, negative for PE  Echo: EF 60%, grade II diastolic dysfunction, mild MR/mod AI  BNP: 622 --> 1233 --> 1144 (4/14) --> 529 (4/21)  CXR: negative acute  Soft tissue neck X-ray: unremarkable  S/P Azithromycin  S/P Prednisone  On Trelegy, Singulair  Note: Lasix & Aldactone on hold 2nd to azotemia  Resp & O2 protocols as needed    Essential hypertension- (present on admission)  Assessment & Plan  BP ok  Cont Atenolol  Cont to monitor    Azotemia- (present on admission)  Assessment & Plan  Bun: 46 --> 38 --> 38 --> 31 --> 33  Encouraging fluid intake  Continue to hold Lasix, Aldactone  Cont to monitor    Leukocytosis- (present on admission)  Assessment & Plan  WBC's: has been elevated since 4/14  WBC's still elevated at 14.9  Has been afebrile  Denies any cough or SOB  O2 sats good on RA  Lungs CTA B/L  PCT (4/15): wnl  Urine cultures showed Candida --> s/p Diflucan (4/21)  Reviewed meds: none seem to be the cause  CXR (4/10): minimal hazy opacity left base, likely atelectasis   CXR (4/13): unremarkable  CXR (4/22): shows a faint left basilar atelectasis or pneumonia  Will get U/A  Will check PCT  Suspect 2nd to buttock pressure sore  Cont to monitor    Multiple drug allergies- (present on admission)  Assessment & Plan  Extensive list noted    Persistent atrial fibrillation (HCC)- (present on  admission)  Assessment & Plan  S/P PPM  Cont Atenolol  Cont Xarelto    Mild episode of recurrent major depressive disorder (HCC)- (present on admission)  Assessment & Plan  Cont Zoloft    Hypothyroidism- (present on admission)  Assessment & Plan  TSH: 0.18  FT4: 1.53  On Synthroid -- dose decreased recently by Dr. Crowell  Needs outpatient follow up

## 2024-04-22 NOTE — THERAPY
Physical Therapy   Daily Treatment     Patient Name: Donte Magaña  Age:  83 y.o., Sex:  female  Medical Record #: [id number]  Today's Date: 4/22/2024     Precautions  Precautions: Fall Risk  Comments: Pacemaker    Subjective    Pt seated in w/c upon arrival, agreeable to session.      Objective       04/22/24 1031   PT Charge Group   PT Gait Training (Units) 1   PT Therapeutic Exercise (Units) 1   PT Therapeutic Activities (Units) 2   Supervising Physical Therapist Mayelin Jaimes   PT Total Time Spent   PT Individual Total Time Spent (Mins) 60   Vitals   Pulse (!) 114   Patient BP Position   (post ambulation)   Pulse Oximetry 97 %   O2 (LPM) 0   O2 Delivery Device Room air w/o2 available   Cognition    Level of Consciousness Alert   Gait Functional Level of Assist    Gait Level Of Assist Standby Assist   Assistive Device Front Wheel Walker   Distance (Feet) 100   # of Times Distance was Traveled 2   Stairs Functional Level of Assist   Level of Assist with Stairs Standby Assist   # of Stairs Climbed 4   Stairs Description Hand rails;Supervision for safety   Transfer Functional Level of Assist   Bed, Chair, Wheelchair Transfer Standby Assist   Bed Chair Wheelchair Transfer Description Initial preparation for task;Supervision for safety  (SPT w/ FWW)   Sitting Lower Body Exercises   Ankle Pumps 1 set of 10;Bilateral   Hip Abduction 1 set of 10;Bilateral   Hip Adduction 1 set of 10;Bilateral   Long Arc Quad 1 set of 10;Bilateral   Marching 1 set of 10   Hamstring Curl 1 set of 10;Bilateral   Bed Mobility    Sit to Stand Supervised   Scooting Modified Independent   Interdisciplinary Plan of Care Collaboration   IDT Collaboration with  Physician   Patient Position at End of Therapy Seated  (in cafeteria)   Collaboration Comments Dr. Daren russell     Completed car xfer into [facility] w/ SPV at w/c level.     Assessment    Pt tolerated session well in RA, O2 remains 97-98% post ambulation. PTA collected irfpai and  pt's ready to be d/c tomorrow.      Strengths: Able to follow instructions, Motivated for self care and independence, Pleasant and cooperative, Willingly participates in therapeutic activities ([place]- elevator access)  Barriers: Decreased endurance, Fatigue, Generalized weakness, Impaired activity tolerance, Impaired balance, Limited mobility (PLOF- primary scooter user)    Plan    D/c tomorrow follow up w/ HHPT    DME  PT DME Recommendations  Additional Equipment:  (Non aticipated; pt has FWW, SPC, scooter)    Passport items to be completed:      Physical Therapy Problems (Active)       Problem: PT-Long Term Goals       Dates: Start:  04/13/24         Goal: LTG-By discharge, patient will propel wheelchair/ scooter mod I >150 ft over various surfaces       Dates: Start:  04/13/24    Expected End:  04/23/24            Goal: LTG-By discharge, patient will ambulate with FWW 50 ft x 2 indoors SPV-mod I        Dates: Start:  04/13/24    Expected End:  04/23/24            Goal: LTG-By discharge, patient will transfer one surface to another mod I with FWW       Dates: Start:  04/13/24    Expected End:  04/23/24

## 2024-04-22 NOTE — CARE PLAN
The patient is Stable - Low risk of patient condition declining or worsening    Shift Goals  Clinical Goals: Safety  Patient Goals: Safety    Progress made toward(s) clinical / shift goals:  CXR/UA for infection rule out      Problem: Knowledge Deficit - Standard  Goal: Patient and family/care givers will demonstrate understanding of plan of care, disease process/condition, diagnostic tests and medications  Outcome: Progressing  Note: Labs monitored, WBCs elevated, CXR completed, UA needs to be collected, follow-up labs ordered for Tuesday 04/23/24     Problem: Hemodynamics  Goal: Patient's hemodynamics, fluid balance and neurologic status will be stable or improve  Outcome: Progressing  Note:    Latest Reference Range & Units 04/22/24 05:53   WBC 4.8 - 10.8 K/uL 14.9 (H)   RBC 4.20 - 5.40 M/uL 3.85 (L)   Hemoglobin 12.0 - 16.0 g/dL 12.2   Hematocrit 37.0 - 47.0 % 37.6   MCV 81.4 - 97.8 fL 97.7   MCH 27.0 - 33.0 pg 31.7   MCHC 32.2 - 35.5 g/dL 32.4   RDW 35.9 - 50.0 fL 48.2   Platelet Count 164 - 446 K/uL 295   MPV 9.0 - 12.9 fL 10.2   Neutrophils-Polys 44.00 - 72.00 % 69.60   Neutrophils (Absolute) 1.82 - 7.42 K/uL 10.35 (H)   Lymphocytes 22.00 - 41.00 % 20.40 (L)   Lymphs (Absolute) 1.00 - 4.80 K/uL 3.03   Monocytes 0.00 - 13.40 % 6.00   Monos (Absolute) 0.00 - 0.85 K/uL 0.89 (H)   Eosinophils 0.00 - 6.90 % 2.80   Eos (Absolute) 0.00 - 0.51 K/uL 0.41   Basophils 0.00 - 1.80 % 0.50   Baso (Absolute) 0.00 - 0.12 K/uL 0.07   Immature Granulocytes 0.00 - 0.90 % 0.70   Immature Granulocytes (abs) 0.00 - 0.11 K/uL 0.11   Nucleated RBC 0.00 - 0.20 /100 WBC 0.00   NRBC (Absolute) K/uL 0.00   Sodium 135 - 145 mmol/L 138   Potassium 3.6 - 5.5 mmol/L 4.4   Chloride 96 - 112 mmol/L 104   Co2 20 - 33 mmol/L 23   Anion Gap 7.0 - 16.0  11.0   Glucose 65 - 99 mg/dL 144 (H)   Bun 8 - 22 mg/dL 33 (H)   Creatinine 0.50 - 1.40 mg/dL 0.84   GFR (CKD-EPI) >60 mL/min/1.73 m 2 68   Calcium 8.5 - 10.5 mg/dL 9.1

## 2024-04-22 NOTE — THERAPY
Occupational Therapy  Daily Treatment     Patient Name: Donte Magaña  Age:  83 y.o., Sex:  female  Medical Record #: [id number]  Today's Date: 4/22/2024     Precautions  Precautions: (P) Fall Risk  Comments: Pacemaker         Subjective    Patient was seated in w/c finishing her breakfast.  She was agreeable to shower and dressing. She was unaware (did not remember) she discharges tomorrow.     Objective       04/22/24 0831   OT Charge Group   OT Self Care / ADL (Units) 4   OT Total Time Spent   OT Individual Total Time Spent (Mins) 60   Precautions   Precautions Fall Risk   Vitals   O2 (LPM) 1   O2 Delivery Device Nasal Cannula   Cognition    Attention Impaired   Initiation Impaired   Comments   (required cues to start getting dressed after drying off after shower)   Functional Level of Assist   Eating Independent   Grooming Independent;Seated   Bathing Modified Independent   Bathing Description Grab bar;Hand held shower;Tub bench   Upper Body Dressing Modified Independent   Lower Body Dressing Modified Independent   Toileting Modified Independent   Toileting Description Grab bar   Toilet Transfers Modified Independent   Toilet Transfer Description Grab bar   Tub / Shower Transfers Modified Independent   Tub Shower Transfer Description Grab bar;Shower bench   Interdisciplinary Plan of Care Collaboration   Patient Position at End of Therapy Seated;Call Light within Reach;Tray Table within Reach;Phone within Reach;Chair Alarm On;Self Releasing Lap Belt Applied         Assessment    Patient completed adl routine with mod I and good safety.  She is ready to d/c home tomorrow.  Strengths: Able to follow instructions, Independent prior level of function, Motivated for self care and independence, Willingly participates in therapeutic activities  Barriers: Decreased endurance, Fatigue, Generalized weakness, Hypotension, Impaired activity tolerance    Plan    D/C to [facility] tomorrow    DME  OT DME Recommendations  Additional  Equipment:  (Non aticipated; pt has FWW, SPC, scooter)        Occupational Therapy Goals (Active)       Problem: Dressing       Dates: Start:  04/17/24         Goal: STG-Within one week, patient will dress LB with supervision/mod I       Dates: Start:  04/17/24    Expected End:  04/23/24               Problem: Functional Transfers       Dates: Start:  04/17/24         Goal: STG-Within one week, patient will transfer to toilet with supervision/mod I       Dates: Start:  04/17/24    Expected End:  04/23/24               Problem: OT Long Term Goals       Dates: Start:  04/13/24         Goal: LTG-By discharge, patient will complete basic self care tasks at SPV-Mod I level using DME/AE PRN.       Dates: Start:  04/13/24    Expected End:  04/23/24            Goal: LTG-By discharge, patient will perform bathroom transfers at SPV-Mod I level using DME/AE PRN.       Dates: Start:  04/13/24    Expected End:  04/23/24               Problem: Toileting       Dates: Start:  04/17/24         Goal: STG-Within one week, patient will complete toileting tasks with supervision/mod I       Dates: Start:  04/17/24    Expected End:  04/23/24

## 2024-04-22 NOTE — DISCHARGE SUMMARY
Physical Medicine & Rehabilitation Discharge Summary    Admission Date: 4/12/2024    Discharge Date: 4/23/2024    Attending Provider: Lisa Mercedes DO, MS    Admission Diagnosis:   Active Hospital Problems    Diagnosis     Hypophosphatemia     Macrocytosis without anemia     Acute respiratory failure (HCC)     Acute respiratory failure with hypoxia (HCC)     Pressure injury of sacral region, stage 2 (HCC)     PVD (peripheral vascular disease) (HCC)     Stage 2 chronic kidney disease     Essential hypertension     Urinary incontinence     Bilateral leg edema     Multiple drug allergies     Azotemia     Leukocytosis     Persistent atrial fibrillation (HCC)     Mild episode of recurrent major depressive disorder (HCC)     Hypothyroidism     Pneumonia        Discharge Diagnosis:  Active Hospital Problems    Diagnosis     Hypophosphatemia     Macrocytosis without anemia     Acute respiratory failure (HCC)     Acute respiratory failure with hypoxia (HCC)     Pressure injury of sacral region, stage 2 (HCC)     PVD (peripheral vascular disease) (HCC)     Stage 2 chronic kidney disease     Essential hypertension     Urinary incontinence     Bilateral leg edema     Multiple drug allergies     Azotemia     Leukocytosis     Persistent atrial fibrillation (HCC)     Mild episode of recurrent major depressive disorder (HCC)     Hypothyroidism     Pneumonia        HPI per Admission History & Physical:  Donte Magaña is an 83-year-old female who presented to [facility] 4/7/2024 with persistent shortness of breath and cough.  She has medical history significant for atrial fibrillation on AC, hypertension, hypothyroidism.  She states that over the past week she has had a persistent cough.  She initially presented to the emergency room on 4/4 but Neel presented on 4/7.  Her viral panel was unremarkable.  She was requiring 2 L of supplemental oxygen.  CT showed multifocal pneumonia.  She was empirically started on IV antibiotics  and was admitted for acute hypoxemic respiratory failure.  Procalcitonin was not elevated, blood cultures have shown no growth to date.  Her BNP was elevated.  Her CTA of the chest was negative for PE with only some bilateral groundglass opacities, all infectious markers negative.  TTE showed normal LVEF and grade 2 diastolic dysfunction with moderate aortic insufficiency.  She was started on Lasix.  Diuresis was ultimately increased and the suspicion for pneumonia being the cause of her symptoms was less likely though she was maintained on antibiotics.  Due to diuresis she sustained acute kidney injury due to hypovolemia and was given fluids, her Lasix, lisinopril, Aldactone have been held.  Azithromycin was started due to concern for atypical pneumonia.  Viral panel was ordered 4/11 and is negative.  Her renal function is improving, she does not have a white count.  BNP is improving.     Patient functioning below her baseline and deemed an appropriate candidate for acute rehab.  She resides at an [facility].  She will plan to have increased services on discharge per her daughter.  Patient admitted to acute rehab 4/12/2024.  On admission patient reports she is doing okay.  Her daughter, Reina, is present.  Discussed plan to start steroids per pulmonology.  These will start tomorrow.  She is amenable to plan of care.  Discussed CODE STATUS and she is full code.    Patient was admitted to [facility] on 4/12/2024.     Hospital Course by Problem List:  Acute heart failure with preserved ejection fraction  PT and OT for mobility and ADLs. Per guidelines, 15 hours per week between PT, OT and/or SLP.  Follow-up cardiology  BNP in the a.m.  Prior to admission: Lasix 60 mg IV twice daily, lisinopril 5 mg daily, Aldactone 25 mg daily on HOLD due to hypotension from aggressive diuresis s/p gentle fluids  Hospitalist consult     Dysphagia  SLP following      Acute hypoxemic respiratory failure secondary to  multifocal pneumonia seen on CT  Procalcitonin, WBC within normal limits.  Being treated with Zithromax through 4/14 for possible atypical pneumonia  RT to consult  Encourage IS  Pep therapy  DuoNeb as needed  Continue Delsym  Continue Brianne Cronin  Pulm recommendation just prior to rehab admission is for short course of prednisone 40 mg daily for 5 days.  Initiating 4/13, completed 4/17.      Hypertension  Hypotension  Prior to admission: Lasix 60 mg IV twice daily, lisinopril 5 mg daily, Aldactone 25 mg daily on HOLD due to hypotension from aggressive diuresis s/p gentle fluids  4/23 BP stable     Leukocytosis  UA culture + Candida albicans. Start Fluconazole 4/17 4/16 Mild improvement in WBC to 11.4  4/18 suspect 2/2 steroid effect. Also being treated for Candida UTI.  4/22 Elevated at 14.9 - has decubitus ulcer and UTI and s/p steroids.   4/23 Improved at 10.9 without additional treatment. UA negative. CXR with mild atelectasis.      Atrial fibrillation  Continue Xarelto 15 mg daily  Continue atenolol 50 mg every evening     TANIA  Secondary to aggressive diuresis  Monitor labs - renal function stable/improving  4/16 improving mildly   4/22 Renal function stable     Hypothyroidism  Continue Synthroid 50 mcg each morning     Pain  Tylenol as needed     Skin  Patient at risk for skin breakdown due to debility in areas including sacrum, achilles, elbows and head in addition to other sites. Nursing to assess skin daily.      Poor appetite  Depression  Continue mirtazapine 15 mg nightly  Continue sertraline 50 mg daily     Bowel   Patient on Senna-docusate for constipation prophylaxis.      Bladder  TV/PVR/BS PRN  Continue vibegron 75 mg daily  UA positive for few bacteria and budding yeast. Not on meds. Culture PENDING 4/17 - + Candida on Diflucan     Fungal/yeast within skin folds  Continue nystatin powder through 4/15     RLE Swelling  US Right LE - negative           DVT PROPHYLAXIS: Xarelto 15 mg daily      HOSPITALIST FOLLOWING: Yes consulted on admission - d/w hospitalist 4/23     CODE STATUS: Full code     DISPO: Home to [facility] versus [facility]     ERLINDA: 4/23/24     MEDS SENT TO: [facility]     DISCHARGE SPECIALIST FOLLOW UP: Cardiology     Patient to scheduled follow up with their PCP within 2 weeks from discharge from the [facility].     Functional Status at Discharge  Eating:  Independent  Eating Description:  Increased time, Modified diet, Set-up of equipment or meal/tube feeding, Supervision for safety, Verbal cueing  Grooming:  Independent, Seated  Grooming Description:   (to brush hair/teeth, wash/dry hands/face)  Bathing:  Modified Independent  Bathing Description:  Grab bar, Hand held shower, Tub bench  Upper Body Dressing:  Modified Independent  Upper Body Dressing Description:  Set-up of equipment  Lower Body Dressing:  Modified Independent  Lower Body Dressing Description:  Supervision for safety  Discharge Location : Assisted Living  Patient Discharging with Assist of: Other (See Comments) ([facility])  Level of Supervision Required: Intermittent Supervision  Recommended Equipment for Discharge: None (has all needed DME already in place)  Recommended Services Upon Discharge: Home Health Occupational Therapy  Long Term Goals Met: 2  Long Term Goals Not Met: 0  Reason(s) for Goals Not Met: n/a  Criteria for Termination of Services: Maximum Function Achieved for Inpatient Rehabilitation  Walk:  Minimal Assist (w/c follow for safety)  Distance Walked:  50  Number of Times Distance Was Traveled:  1  Assistive Device:  Single Point Cane  Gait Deviation:  Decreased Heel Strike, Decreased Toe Off (cues for FWW proximity)  Wheelchair:  Supervised  Distance Propelled:  50   Wheelchair Description:  Extra time, Limited by fatigue, Supervision for safety, Verbal cueing  Stairs Contact Guard Assist  Stairs Description Extra time, Hand rails, Supervision for safety, Verbal cueing, Limited by fatigue, Oxygen      Comprehension:  Minimal Assist  Comprehension Description:  Glasses, Verbal cues  Expression:  Supervision  Expression Description:  Verbal cueing  Social Interaction:  Supervision  Social Interaction Description:  Increased time, Verbal cues, Schedule  Problem Solving:  Moderate Assist  Problem Solving Description:  Bed/chair alarm, Increased time, Seat belt, Supervision, Therapy schedule  Memory:  Maximal Assist  Memory Description:  Bed/chair alarm, Increased time, Seat belt, Therapy schedule, Supervision, Verbal cueing       Lisa HERNANDEZ D.O., personally performed a complete drug regimen review and no potential clinically significant medication issues were identified.   Discharge Medication:     Medication List        START taking these medications        Instructions   montelukast 10 MG Tabs  Commonly known as: Singulair  Notes to patient: Allergy    Take 1 Tablet by mouth every evening.  Dose: 10 mg     vibegron 75 MG tablet  Commonly known as: Gemtesa  Notes to patient: Urinary incontinence   Take 1 Tablet by mouth every day. Indications: Urinary Incontinence  Dose: 75 mg            CHANGE how you take these medications        Instructions   levothyroxine 25 MCG Tabs  What changed:   medication strength  how much to take  Commonly known as: Synthroid  Notes to patient: Thyroid support    Take 1.5 Tablets by mouth every morning on an empty stomach. Indications: Underactive Thyroid  Dose: 37.5 mcg     rivaroxaban 15 MG Tabs tablet  What changed:   medication strength  how much to take  Commonly known as: Xarelto  Notes to patient: Heart  a-fib    Take 1 Tablet by mouth with dinner. Indications: Atrial Fibrillation with Blood Vessel Occlusion Process  Dose: 15 mg            CONTINUE taking these medications        Instructions   Acetaminophen 500 MG Caps  Notes to patient: Pain    Take 2 Caps by mouth 3 times a day as needed. Indications: Pain  Dose: 2 Capsule     atenolol 50 MG Tabs  Commonly known  as: Tenormin  Notes to patient: Blood pressure    Take 1 Tablet by mouth every evening. Indications: High Blood Pressure Disorder  Dose: 50 mg     fluticasone-umeclidinium-vilanterol 100-62.5-25 mcg/act inhaler  Commonly known as: Trelegy Ellipta  Notes to patient: allergies   Inhale 1 Puff every day.  Dose: 1 Puff     mirtazapine 15 MG Tabs  Commonly known as: Remeron  Notes to patient: Depression, anxiety, insomnia    Take 1 Tablet by mouth at bedtime.  Dose: 15 mg     sertraline 50 MG Tabs  Commonly known as: Zoloft  Notes to patient: Depression    Take 1 Tablet by mouth every day. Indications: Major Depressive Disorder  Dose: 50 mg            STOP taking these medications      azithromycin 500 MG tablet  Commonly known as: Zithromax     benzonatate 100 MG Caps  Commonly known as: Tessalon     Dextromethorphan Polistirex ER 30 MG/5ML Suer  Commonly known as: Delsym     diphenhydrAMINE 25 MG Tabs  Commonly known as: Benadryl     DIPHENHYDRAMINE HCL (TOPICAL) 2 % Gel     Fluticasone Propionate (Inhal) 50 MCG/ACT Aepb     furosemide 40 MG Tabs  Commonly known as: Lasix     HCA TRIPLE ANTIBIOTIC OINTMENT EX     Lidocaine 4 % Aero     lisinopril 5 MG Tabs  Commonly known as: Prinivil     Lutein 20 MG Caps     magnesium oxide 400 MG Tabs tablet  Commonly known as: Mag-Ox     PREBIOTIC PRODUCT PO     predniSONE 20 MG Tabs  Commonly known as: Deltasone     SENOKOT PO     spironolactone 25 MG Tabs  Commonly known as: Aldactone     trolamine salicylate 10 % cream  Commonly known as: Aspercreme Or Myoflex     Tums 500 MG Chew  Generic drug: calcium carbonate     vitamin D 2000 UNIT Tabs     Voltaren 1 % Gel  Generic drug: diclofenac sodium              Discharge Diet:  Current Diet Order   Procedures    Diet Order Diet: Regular (cruch meds that can be crushe in puree. float others one at a time,  up in wheel chair and in dining room for all meals)       Discharge Activity:  Per PT/OT    Disposition:  Patient to discharge  home with family support and community resources.    Equipment:  Per PT/OT    Follow-up & Discharge Instructions:  Follow up with your primary care provider (PCP) within 7-10 days of discharge to review your medications and take over your care.     If you develop chest pain, fever, chills, change in neurologic function (weakness, sensation changes, vision changes), or other concerning sxs, seek immediate medical attention or call 911.      Future Appointments   Date Time Provider Department Center   4/22/2024  1:00 PM Joceline Trujillo MS,[facility] None   4/23/2024  7:00 AM REHAB NON-THERAPY PROVIDER [facility]1 None   4/24/2024  7:00 AM REHAB NON-THERAPY PROVIDER [facility]1 None   4/25/2024  7:00 AM REHAB NON-THERAPY PROVIDER [facility]1 None   4/26/2024  7:00 AM REHAB NON-THERAPY PROVIDER [facility]1 None   4/27/2024  7:00 AM REHAB NON-THERAPY PROVIDER [facility]1 None   4/28/2024  7:00 AM REHAB NON-THERAPY PROVIDER [facility]1 None   4/29/2024  7:00 AM REHAB NON-THERAPY PROVIDER [facility]1 None   4/30/2024  7:00 AM REHAB NON-THERAPY PROVIDER [facility]1 None   5/1/2024  7:00 AM REHAB NON-THERAPY PROVIDER [facility]1 None   5/2/2024  7:00 AM REHAB NON-THERAPY PROVIDER [facility]1 None   5/3/2024  7:00 AM REHAB NON-THERAPY PROVIDER [facility]1 None   5/4/2024  7:00 AM REHAB NON-THERAPY PROVIDER [facility]1 None   5/5/2024  7:00 AM REHAB NON-THERAPY PROVIDER [facility]1 None   5/6/2024  7:00 AM REHAB NON-THERAPY PROVIDER [facility]1 None   5/7/2024  7:00 AM REHAB NON-THERAPY PROVIDER RH1 None   5/8/2024  7:00 AM REHAB NON-THERAPY PROVIDER [facility]1 None   7/15/2024  4:20 PM Jolie Escobar M.D. [facility]       Condition on Discharge:  Good    More than 35 minutes was spent on discharging this patient, including face-to-face time, prescription management, and the dictation of this note.    Dr. Lisa Mercedes DO, MS  Department of Physical Medicine & Rehabilitation    Date of Service: 4/23/2024

## 2024-04-22 NOTE — PROGRESS NOTES
"  Physical Medicine & Rehabilitation Progress Note    Encounter Date: 4/22/2024    Chief Complaint: Feeling well    Interval Events (Subjective):  VSS  BM 4/21  Voiding     Patient seen and examined in the gym. She is feeling well. No trouble breathing, is off of O2. No pain with urination. Denies any other systemic complaints.     ROS: 14 point ROS negative unless otherwise specified in the HPI    Objective:  VITAL SIGNS: /66   Pulse 80   Temp 36.6 °C (97.9 °F) (Oral)   Resp 16   Ht 1.626 m (5' 4\")   Wt 77.1 kg (170 lb 1 oz)   LMP 01/10/1975   SpO2 96%   BMI 29.19 kg/m²     GEN: No apparent distress  HEENT: Head normocephalic, atraumatic.  Sclera nonicteric bilaterally, no ocular discharge appreciated bilaterally.  CV: Extremities warm and well-perfused, no peripheral edema appreciated bilaterally.  PULMONARY: Breathing nonlabored on RA.   ABD: Soft, nontender.  PSYCH: Mood and affect within normal limits.  NEURO: Awake alert.  Conversational.  Logical thought content.      Laboratory Values:  Recent Results (from the past 72 hour(s))   Basic Metabolic Panel    Collection Time: 04/21/24  5:56 AM   Result Value Ref Range    Sodium 140 135 - 145 mmol/L    Potassium 4.9 3.6 - 5.5 mmol/L    Chloride 103 96 - 112 mmol/L    Co2 27 20 - 33 mmol/L    Glucose 148 (H) 65 - 99 mg/dL    Bun 31 (H) 8 - 22 mg/dL    Creatinine 0.94 0.50 - 1.40 mg/dL    Calcium 9.2 8.5 - 10.5 mg/dL    Anion Gap 10.0 7.0 - 16.0   MAGNESIUM    Collection Time: 04/21/24  5:56 AM   Result Value Ref Range    Magnesium 2.0 1.5 - 2.5 mg/dL   PHOSPHORUS    Collection Time: 04/21/24  5:56 AM   Result Value Ref Range    Phosphorus 3.6 2.5 - 4.5 mg/dL   CBC WITH DIFFERENTIAL    Collection Time: 04/21/24  5:56 AM   Result Value Ref Range    WBC 13.0 (H) 4.8 - 10.8 K/uL    RBC 3.85 (L) 4.20 - 5.40 M/uL    Hemoglobin 12.3 12.0 - 16.0 g/dL    Hematocrit 38.3 37.0 - 47.0 %    MCV 99.5 (H) 81.4 - 97.8 fL    MCH 31.9 27.0 - 33.0 pg    MCHC 32.1 (L) " 32.2 - 35.5 g/dL    RDW 49.0 35.9 - 50.0 fL    Platelet Count 307 164 - 446 K/uL    MPV 10.1 9.0 - 12.9 fL    Neutrophils-Polys 61.80 44.00 - 72.00 %    Lymphocytes 24.60 22.00 - 41.00 %    Monocytes 6.60 0.00 - 13.40 %    Eosinophils 5.50 0.00 - 6.90 %    Basophils 0.60 0.00 - 1.80 %    Immature Granulocytes 0.90 0.00 - 0.90 %    Nucleated RBC 0.00 0.00 - 0.20 /100 WBC    Neutrophils (Absolute) 8.00 (H) 1.82 - 7.42 K/uL    Lymphs (Absolute) 3.19 1.00 - 4.80 K/uL    Monos (Absolute) 0.85 0.00 - 0.85 K/uL    Eos (Absolute) 0.71 (H) 0.00 - 0.51 K/uL    Baso (Absolute) 0.08 0.00 - 0.12 K/uL    Immature Granulocytes (abs) 0.12 (H) 0.00 - 0.11 K/uL    NRBC (Absolute) 0.00 K/uL   proBrain Natriuretic Peptide, NT    Collection Time: 04/21/24  5:56 AM   Result Value Ref Range    NT-proBNP 529 (H) 0 - 125 pg/mL   ESTIMATED GFR    Collection Time: 04/21/24  5:56 AM   Result Value Ref Range    GFR (CKD-EPI) 59 (A) >60 mL/min/1.73 m 2   CBC WITH DIFFERENTIAL    Collection Time: 04/22/24  5:53 AM   Result Value Ref Range    WBC 14.9 (H) 4.8 - 10.8 K/uL    RBC 3.85 (L) 4.20 - 5.40 M/uL    Hemoglobin 12.2 12.0 - 16.0 g/dL    Hematocrit 37.6 37.0 - 47.0 %    MCV 97.7 81.4 - 97.8 fL    MCH 31.7 27.0 - 33.0 pg    MCHC 32.4 32.2 - 35.5 g/dL    RDW 48.2 35.9 - 50.0 fL    Platelet Count 295 164 - 446 K/uL    MPV 10.2 9.0 - 12.9 fL    Neutrophils-Polys 69.60 44.00 - 72.00 %    Lymphocytes 20.40 (L) 22.00 - 41.00 %    Monocytes 6.00 0.00 - 13.40 %    Eosinophils 2.80 0.00 - 6.90 %    Basophils 0.50 0.00 - 1.80 %    Immature Granulocytes 0.70 0.00 - 0.90 %    Nucleated RBC 0.00 0.00 - 0.20 /100 WBC    Neutrophils (Absolute) 10.35 (H) 1.82 - 7.42 K/uL    Lymphs (Absolute) 3.03 1.00 - 4.80 K/uL    Monos (Absolute) 0.89 (H) 0.00 - 0.85 K/uL    Eos (Absolute) 0.41 0.00 - 0.51 K/uL    Baso (Absolute) 0.07 0.00 - 0.12 K/uL    Immature Granulocytes (abs) 0.11 0.00 - 0.11 K/uL    NRBC (Absolute) 0.00 K/uL   Basic Metabolic Panel    Collection  Time: 04/22/24  5:53 AM   Result Value Ref Range    Sodium 138 135 - 145 mmol/L    Potassium 4.4 3.6 - 5.5 mmol/L    Chloride 104 96 - 112 mmol/L    Co2 23 20 - 33 mmol/L    Glucose 144 (H) 65 - 99 mg/dL    Bun 33 (H) 8 - 22 mg/dL    Creatinine 0.84 0.50 - 1.40 mg/dL    Calcium 9.1 8.5 - 10.5 mg/dL    Anion Gap 11.0 7.0 - 16.0   ESTIMATED GFR    Collection Time: 04/22/24  5:53 AM   Result Value Ref Range    GFR (CKD-EPI) 68 >60 mL/min/1.73 m 2       Medications:  Scheduled Medications   Medication Dose Frequency    rivaroxaban  15 mg PM MEAL    senna-docusate  1 Tablet BID    polyethylene glycol/lytes  1 Packet DAILY    levothyroxine  37.5 mcg AM ES    montelukast  10 mg Nightly    Pharmacy Consult Request  1 Each PHARMACY TO DOSE    atenolol  50 mg Q EVENING    fluticasone-umeclidinium-vilanterol  1 Puff QDAILY (RT)    mirtazapine  15 mg QHS    sertraline  50 mg DAILY    vibegron  75 mg DAILY     PRN medications: bisacodyl, Respiratory Therapy Consult, hydrALAZINE, carboxymethylcellulose, benzocaine-menthol, traZODone, sodium chloride, acetaminophen, ALPRAZolam, benzonatate, ipratropium-albuterol, menthol    Diet:  Current Diet Order   Procedures    Diet Order Diet: Regular (cruch meds that can be crushe in puree. float others one at a time,  up in wheel chair and in dining room for all meals)       Medical Decision Making and Plan:  Acute heart failure with preserved ejection fraction  PT and OT for mobility and ADLs. Per guidelines, 15 hours per week between PT, OT and/or SLP.  Follow-up cardiology  BNP in the a.m.  Prior to admission: Lasix 60 mg IV twice daily, lisinopril 5 mg daily, Aldactone 25 mg daily on HOLD due to hypotension from aggressive diuresis s/p gentle fluids  Hospitalist consult    Dysphagia  SLP following      Acute hypoxemic respiratory failure secondary to multifocal pneumonia seen on CT  Procalcitonin, WBC within normal limits.  Being treated with Zithromax through 4/14 for possible atypical  pneumonia  RT to consult  Encourage IS  Pep therapy  DuoNeb as needed  Continue Delsym  Continue Trelegy Ellipta  Pulm recommendation just prior to rehab admission is for short course of prednisone 40 mg daily for 5 days.  Initiating , completed .      Hypertension  Hypotension  Prior to admission: Lasix 60 mg IV twice daily, lisinopril 5 mg daily, Aldactone 25 mg daily on HOLD due to hypotension from aggressive diuresis s/p gentle fluids   BP stable    Leukocytosis  UA culture + Candida albicans. Start Fluconazole  Mild improvement in WBC to 11.4   suspect  steroid effect. Also being treated for Candida UTI.   Elevated at 14.9 - has decubitus ulcer and UTI and s/p steroids.      Atrial fibrillation  Continue Xarelto 15 mg daily  Continue atenolol 50 mg every evening     TANIA  Secondary to aggressive diuresis  Monitor labs - renal function stable/improving   improving mildly    Renal function stable     Hypothyroidism  Continue Synthroid 50 mcg each morning     Pain  Tylenol as needed     Skin  Patient at risk for skin breakdown due to debility in areas including sacrum, achilles, elbows and head in addition to other sites. Nursing to assess skin daily.      Poor appetite  Depression  Continue mirtazapine 15 mg nightly  Continue sertraline 50 mg daily     Bowel   Patient on Senna-docusate for constipation prophylaxis.      Bladder  TV/PVR/BS PRN  Continue vibegron 75 mg daily  UA positive for few bacteria and budding yeast. Not on meds. Culture PENDING  - + Candida on Diflucan     Fungal/yeast within skin folds  Continue nystatin powder through 4/15    RLE Swelling  US Right LE - negative        Upcoming Labs/imagin/22     DVT PROPHYLAXIS: Xarelto 15 mg daily     HOSPITALIST FOLLOWING: Yes consulted on admission - d/w hospitalist      CODE STATUS: Full code     DISPO: Home to [facility] versus skilled nursing     ERLINDA: 24     MEDS SENT TO: M2B Eligible     DISCHARGE SPECIALIST  FOLLOW UP: Cardiology     Patient to scheduled follow up with their PCP within 2 weeks from discharge from the [facility].      ____________________________________    Dr. Lisa Mercedes DO, MS  [facility] - Physical Medicine & Rehabilitation   ____________________________________

## 2024-04-22 NOTE — CARE PLAN
The patient is Stable - Low risk of patient condition declining or worsening    Shift Goals  Clinical Goals: Safety  Patient Goals: Participate in therapy

## 2024-04-22 NOTE — DISCHARGE INSTRUCTIONS
Heart Failure, Diagnosis    Heart failure means that your heart is not able to pump blood in the right way. This makes it hard for your body to work well. Heart failure is usually a long-term (chronic) condition. You must take good care of yourself and follow your treatment plan from your doctor.  Different stages of heart failure have different treatment plans. The stages are:  Stage A: At risk for heart failure.  Stage B: Pre-heart failure.  Stage C: Symptomatic heart failure.  Stage D: Advanced heart failure.  What are the causes?  High blood pressure.  Buildup of cholesterol and fat in the arteries.  Heart attack. This injures the heart muscle.  Heart valves that do not open and close properly.  Damage of the heart muscle. This is also called cardiomyopathy.  Infection of the heart muscle. This is also called myocarditis.  Lung disease.  What increases the risk?  Getting older. The risk of heart failure goes up as a person ages.  Being overweight.  Using tobacco or nicotine products.  Abusing alcohol or drugs.  Having taken medicines that can damage the heart.  Having any of these conditions:  Diabetes.  Abnormal heart rhythms.  Thyroid problems.  Low blood counts (anemia).  Having a family history of heart failure.  What are the signs or symptoms?  Shortness of breath.  Coughing.  Swelling of the feet, ankles, legs, or belly.  Losing or gaining weight for no reason.  Trouble breathing.  Waking from sleep because of the need to sit up and get more air.  Fast heartbeat.  Other symptoms may include:  Being very tired.  Feeling dizzy, or feeling like you may pass out (faint).  Having no desire to eat.  Feeling like you may vomit (nauseous).  Peeing (urinating) more at night.  Feeling confused.  How is this treated?  This condition may be treated with:  Medicines. These can be given to treat blood pressure and to make the heart muscles stronger.  Changes in your daily life. These may include:  Eating a healthy  diet.  Staying at a healthy body weight.  Quitting tobacco, alcohol, and drug use.  Doing exercises.  Participating in a cardiac rehabilitation program. This program helps you improve your health through exercise, education, and counseling.  Surgery. Surgery can be done to open blocked valves or to put devices in the heart, such as pacemakers.  A donor heart (heart transplant). You will receive a healthy heart from a donor.  Follow these instructions at home:  Treat other conditions as told by your doctor. These may include high blood pressure, diabetes, thyroid disease, or abnormal heart rhythms.  Learn as much as you can about heart failure.  Get support as you need it.  Keep all follow-up visits.  Where to find more information  American Heart Association: www.heart.org  Centers for Disease Control and Prevention: www.cdc.gov  National [place] on Aging: www.nima.nih.gov  Summary  Heart failure means that your heart is not able to pump blood in the right way.  This condition is often caused by high blood pressure, heart attack, or damage of the heart muscle.  Symptoms of this condition include shortness of breath and swelling of the feet, ankles, legs, or belly. You may also feel very tired or feel like you may vomit.  You may be treated with medicines, surgery, or changes in your daily life.  Treat other health conditions as told by your doctor.  This information is not intended to replace advice given to you by your health care provider. Make sure you discuss any questions you have with your health care provider.  Document Revised: 06/16/2022 Document Reviewed: 07/10/2021  Elsevier Patient Education © 2023 Elsevier Inc.         Supporting Someone With Depression  Depression is a mental health condition that affects the way a person feels, thinks, and handles daily activities such as eating, sleeping, and working. When a person has depression, his or her condition can affect others around him or her, such as  friends and family members. Friends and family can help by offering support and understanding.  What do I need to know about this condition?  The main symptoms of depression are:  Constant depressed or irritable mood.  Loss of interest in things and activities that were enjoyed in the past.  Other symptoms of depression include:  Fatigue.  Sleeping too much or too little.  Difficulty falling asleep, or waking up early and not being able to get back to sleep.  Difficulty concentrating or making decisions.  Changes in appetite and weight.  Staying away from others (isolating oneself).  Expressing feelings of guilt.  Expressing suicidal thoughts or feelings.  What do I need to know about the treatment options?  This condition is usually treated by mental health providers such as psychologists, psychiatrists, and clinical social workers. Treatment may include one or more of the following:  Psychotherapy, also called talk therapy or counseling. Types of psychotherapy include individual or group therapy, and they usually involve the following approaches:  Cognitive behavioral therapy (CBT). This type of therapy teaches a person how to recognize feelings, thoughts, and behaviors that contribute to depression. The person is taught to make a choice about how to respond to these feelings, thoughts, and behaviors so that he or she can experience fewer symptoms.  Interpersonal therapy (IPT). This helps to improve the way that someone with depression relates to and communicates with others. This type of therapy may involve caregivers and loved ones.  Family therapy. This treatment helps family members to communicate and deal with conflict in healthy ways.  Medicine. This is often used to help with certain emotions and behaviors.  Combining medicine and therapy is often the most effective approach. The following lifestyle changes may also help with managing symptoms of depression:  Limiting alcohol and drug use.  Exercising  regularly.  Getting enough good-quality sleep.  Making healthy eating choices.  Reducing distressing situations.  Spending time outside.  Following regular daily routines.  How can I support my loved one?  Talk about the condition  Good communication is the key to supporting your friend or family member. Here are a few things to keep in mind:  Ask your loved one how you can support him or her.  Respect your loved one's right to make decisions.  Be careful about too much prodding. Try not to overdo reminders to an adult friend or family member about things like taking medicines. Ask how your loved one prefers that you help.  Be encouraging and offer emotional support. This can help to lower stress. Even saying something simple to comfort your loved one may help.  Never ignore comments about suicide, and do not try to avoid the subject of suicide. Talking about suicide will not make your loved one want to act on it. You or your loved one can reach out 24 hours a day to get free, private support (on the phone or a live online chat) from a suicide crisis helpline, such as the National Suicide Prevention Lifeline at 1-216.485.1181.  Listening is very important. Be available if your friend or family member wants to talk. Make an effort to acknowledge his or her feelings and stay calm and realistic.  Find support and resources  A health care provider may be able to recommend mental health resources that are available online or over the phone. You could start with:  Government sites such as the Substance Abuse and Mental Health Services Administration (SAMHSA): www.samhsa.gov  National mental health organizations such as the National [place] on Mental [place]ness (AMAURI): www.amauri.org  You may also consider:  Joining self-help and support groups, not only for your friend or family member, but also for yourself. People in these peer and family support groups understand what you and your loved one are going through. They can help  you feel a sense of hope and connect you with local resources to help you learn more.  Attending family therapy with your loved one.  General support  Make an effort to learn all you can about depression.  Help your loved one follow his or her treatment plan as directed by health care providers. This could mean driving him or her to therapy sessions or suggesting ways to cope with stress.  Ask your loved one if you may join him or her for a therapy session or go with him or her to health care visits. Joining your loved one with his or her permission can give you an opportunity to learn how to be more supportive.  Include your loved one in activities. Invite her or him to go for walks and outings. At first, your loved one may not want to, but keep trying.  Be patient and do not expect your loved one to do too much too soon.  Help with daily responsibilities, such as laundry or meals. Sometimes daily tasks seem overwhelming to a person with depression.  Remember that your support really matters. Social support is a huge benefit for someone who is coping with depression.  How can I create a safe environment?  If your loved one feels unable to control his or her behavior, it may be necessary to take steps to keep his or her home safe. Such steps may include:  Locking up alcohol and prescription pills that your loved one may turn to. Count prescription pills often. You may want to consider removing alcohol from the home.  Removing or locking up guns and other weapons. If you do not have a safe place to keep a gun, local law enforcement may store a gun for you.  Making a written crisis plan. Include important phone numbers, such as the local crisis intervention team. Make sure that:  The person with depression knows about this plan.  Everyone who has regular contact with that person knows about the plan and knows what to do in an emergency.  How should I care for myself?  It is important to find ways to care for your  "body, mind, and well-being while supporting someone with depression.  Spend time with friends and family. Find someone you can talk to who will also help you work on using coping skills to manage stress. Consider seeking therapy for yourself.  Try to maintain your normal routines. This can help you remember that your life is about more than your loved one's condition.  Understand what your limits are. Say \"no\" to requests or events that lead to a schedule that is too busy.  Make time for activities that help you relax, and try to not feel guilty about taking time for yourself.  Consider trying meditation and deep breathing exercises to lower stress.  Get plenty of sleep.  Exercise, even if it is just taking a short walk a few times a week.  What are some signs that the condition is getting worse?  Signs that your loved one's condition may be getting worse include:  Symptoms returning or getting worse.  Not taking medicines or attending therapy as prescribed.  Having more trouble sleeping or doing everyday activities.  Withdrawal from friends and family.  Get help right away if:  Your loved one expresses serious thoughts about self-harm or about hurting others.  Your loved one sees, hears, tastes, smells, or feels things that are not present (hallucinations).  If you ever feel like your loved one may hurt himself or herself or others, or may have thoughts about taking his or her own life, get help right away. You can go to your nearest emergency department or call:  Your local emergency services (911 in the [place]  A suicide crisis helpline, such as the National Suicide Prevention Lifeline at 1-947.208.8270. This is open 24 hours a day.  Summary  Depression is a mood disorder that affects the way a person feels, thinks, and handles daily activities.  Depression is usually treated by mental health professionals. It may include psychotherapy, medicine, lifestyle changes, or a combination of these approaches.  When you " support a loved one with depression, it is important to keep yourself healthy and safe.  Get help right away if your loved one expresses serious thoughts about self-harm.  This information is not intended to replace advice given to you by your health care provider. Make sure you discuss any questions you have with your health care provider.  Document Released: 05/01/2018 Document Revised: 04/09/2020 Document Reviewed: 05/01/2018  Elsevier Patient Education © 2020 [employer]    Occupational Therapy Discharge Instructions for [facility]    4/22/2024    Level of Assist Required for Eating: Able to Complete Eating without Assist  Level of Assist Required for Grooming: Able to Complete Grooming without Assist  Level of Assist Required for Dressing: Able to Complete Dressing without Assist  Level of Assist Required for Toileting: Able to Complete Toileting without Assist  Equipment for Toilet Transfer: Grab Bars by Toilet  Level of Assist Required for Bathing: Requires Supervision with Bathing  Equipment for Bathing: Shower Chair, Grab Bars in Tub / Shower, Hand Held Shower Head  Level of Assist Required for Shower Transfer: Requires Supervision with Shower Transfer  Equipment for Shower Transfer: Grab Bars in Tub / Shower, Shower Chair  Level of Assist Required for Home Mgmt: Requires Physical Assist with Home Management  Level of Assist Required for Meal Prep: Requires Physical Assist with Meal Preparation  Driving: May not Drive, Please Contact Physician for Further Information  Home Exercise Program: None Issued      Speech Therapy Discharge Instructions for [facility]    4/23/2024    Diet: Regular (7), Thin (0)    Other Diet Instructions: Crush those medications that can be crushed, and float in puree.  Otherwise floate whole one at a time in puree.  Supervision: Recommend intermittent supervision.  Please refer to PT/OT recommendations for supervision needs when on feet and during  actitivites.    Cognition / Communication: Help patient improve independence for memory by giving him/her enough extra time to process.  Talk together about potential challenges to transfers and ambulation before performing them, and review strategies for safety.  Use [facility] memory strategies to reinforce new learning.  R - repeat, repeat, repeat.   W - write it down or get it in writing.   A - associate the new information with something that you already know very well.   V - visualize it, practice in your mind's eye, when you aren't able to perform the action physically.  S - say it back, out loud.  This benefits you, as you repeat the information for practice. You also are seeking clarification from the educator, evaluating to see if all of the information was understood, and prompting feedback if needed.  And it slows down the exchange, buying extra time to process the information.    It was a pleasure to work with you. --- Joceline Trujillo M.S. CCC-SLP

## 2024-04-23 ENCOUNTER — APPOINTMENT (OUTPATIENT)
Dept: INPATIENT REHAB | Facility: REHABILITATION | Age: 84
End: 2024-04-23
Payer: MEDICARE

## 2024-04-23 VITALS
RESPIRATION RATE: 16 BRPM | HEART RATE: 88 BPM | HEIGHT: 64 IN | TEMPERATURE: 97.5 F | WEIGHT: 170.06 LBS | SYSTOLIC BLOOD PRESSURE: 123 MMHG | OXYGEN SATURATION: 97 % | DIASTOLIC BLOOD PRESSURE: 74 MMHG | BODY MASS INDEX: 29.03 KG/M2

## 2024-04-23 LAB
BASOPHILS # BLD AUTO: 0.7 % (ref 0–1.8)
BASOPHILS # BLD: 0.08 K/UL (ref 0–0.12)
EOSINOPHIL # BLD AUTO: 0.59 K/UL (ref 0–0.51)
EOSINOPHIL NFR BLD: 5.4 % (ref 0–6.9)
ERYTHROCYTE [DISTWIDTH] IN BLOOD BY AUTOMATED COUNT: 49.6 FL (ref 35.9–50)
HCT VFR BLD AUTO: 41 % (ref 37–47)
HGB BLD-MCNC: 13.2 G/DL (ref 12–16)
IMM GRANULOCYTES # BLD AUTO: 0.08 K/UL (ref 0–0.11)
IMM GRANULOCYTES NFR BLD AUTO: 0.7 % (ref 0–0.9)
LYMPHOCYTES # BLD AUTO: 2.68 K/UL (ref 1–4.8)
LYMPHOCYTES NFR BLD: 24.6 % (ref 22–41)
MCH RBC QN AUTO: 32 PG (ref 27–33)
MCHC RBC AUTO-ENTMCNC: 32.2 G/DL (ref 32.2–35.5)
MCV RBC AUTO: 99.5 FL (ref 81.4–97.8)
MONOCYTES # BLD AUTO: 0.87 K/UL (ref 0–0.85)
MONOCYTES NFR BLD AUTO: 8 % (ref 0–13.4)
NEUTROPHILS # BLD AUTO: 6.61 K/UL (ref 1.82–7.42)
NEUTROPHILS NFR BLD: 60.6 % (ref 44–72)
NRBC # BLD AUTO: 0 K/UL
NRBC BLD-RTO: 0 /100 WBC (ref 0–0.2)
PLATELET # BLD AUTO: 286 K/UL (ref 164–446)
PMV BLD AUTO: 10.4 FL (ref 9–12.9)
PROCALCITONIN SERPL-MCNC: 0.09 NG/ML
RBC # BLD AUTO: 4.12 M/UL (ref 4.2–5.4)
WBC # BLD AUTO: 10.9 K/UL (ref 4.8–10.8)

## 2024-04-23 PROCEDURE — 700102 HCHG RX REV CODE 250 W/ 637 OVERRIDE(OP)

## 2024-04-23 PROCEDURE — 36415 COLL VENOUS BLD VENIPUNCTURE: CPT

## 2024-04-23 PROCEDURE — 94640 AIRWAY INHALATION TREATMENT: CPT

## 2024-04-23 PROCEDURE — 94760 N-INVAS EAR/PLS OXIMETRY 1: CPT

## 2024-04-23 PROCEDURE — 99239 HOSP IP/OBS DSCHRG MGMT >30: CPT | Performed by: PHYSICAL MEDICINE & REHABILITATION

## 2024-04-23 PROCEDURE — 94669 MECHANICAL CHEST WALL OSCILL: CPT

## 2024-04-23 PROCEDURE — 700102 HCHG RX REV CODE 250 W/ 637 OVERRIDE(OP): Performed by: GENERAL PRACTICE

## 2024-04-23 PROCEDURE — A9270 NON-COVERED ITEM OR SERVICE: HCPCS | Performed by: HOSPITALIST

## 2024-04-23 PROCEDURE — A9270 NON-COVERED ITEM OR SERVICE: HCPCS | Performed by: PHYSICAL MEDICINE & REHABILITATION

## 2024-04-23 PROCEDURE — A9270 NON-COVERED ITEM OR SERVICE: HCPCS

## 2024-04-23 PROCEDURE — 84145 PROCALCITONIN (PCT): CPT

## 2024-04-23 PROCEDURE — 85025 COMPLETE CBC W/AUTO DIFF WBC: CPT

## 2024-04-23 PROCEDURE — A9270 NON-COVERED ITEM OR SERVICE: HCPCS | Performed by: GENERAL PRACTICE

## 2024-04-23 PROCEDURE — 700102 HCHG RX REV CODE 250 W/ 637 OVERRIDE(OP): Performed by: PHYSICAL MEDICINE & REHABILITATION

## 2024-04-23 PROCEDURE — 700102 HCHG RX REV CODE 250 W/ 637 OVERRIDE(OP): Performed by: HOSPITALIST

## 2024-04-23 RX ORDER — OXYCODONE HYDROCHLORIDE 5 MG/1
5 TABLET ORAL ONCE
Status: COMPLETED | OUTPATIENT
Start: 2024-04-23 | End: 2024-04-23

## 2024-04-23 RX ADMIN — OXYCODONE 5 MG: 5 TABLET ORAL at 03:10

## 2024-04-23 RX ADMIN — VIBEGRON 75 MG: 75 TABLET, FILM COATED ORAL at 08:03

## 2024-04-23 RX ADMIN — POLYETHYLENE GLYCOL 3350 1 PACKET: 17 POWDER, FOR SOLUTION ORAL at 08:03

## 2024-04-23 RX ADMIN — SERTRALINE HYDROCHLORIDE 50 MG: 50 TABLET ORAL at 08:03

## 2024-04-23 RX ADMIN — FLUTICASONE FUROATE, UMECLIDINIUM BROMIDE AND VILANTEROL TRIFENATATE 1 PUFF: 100; 62.5; 25 POWDER RESPIRATORY (INHALATION) at 06:46

## 2024-04-23 RX ADMIN — LEVOTHYROXINE SODIUM 37.5 MCG: 0.07 TABLET ORAL at 05:13

## 2024-04-23 RX ADMIN — SENNOSIDES AND DOCUSATE SODIUM 1 TABLET: 50; 8.6 TABLET ORAL at 08:03

## 2024-04-23 ASSESSMENT — PATIENT HEALTH QUESTIONNAIRE - PHQ9
2. FEELING DOWN, DEPRESSED, IRRITABLE, OR HOPELESS: NOT AT ALL
1. LITTLE INTEREST OR PLEASURE IN DOING THINGS: NOT AT ALL
2. FEELING DOWN, DEPRESSED, IRRITABLE, OR HOPELESS: NOT AT ALL
SUM OF ALL RESPONSES TO PHQ9 QUESTIONS 1 AND 2: 0
SUM OF ALL RESPONSES TO PHQ9 QUESTIONS 1 AND 2: 0

## 2024-04-23 ASSESSMENT — PAIN DESCRIPTION - PAIN TYPE
TYPE: ACUTE PAIN
TYPE: ACUTE PAIN

## 2024-04-23 NOTE — FLOWSHEET NOTE
04/23/24 0646   Events/Summary/Plan   Events/Summary/Plan mdi   Vital Signs   Pulse 83   Respiration 16   Pulse Oximetry 92 %   $ Pulse Oximetry (Spot Check) Yes   Respiratory Assessment   Level of Consciousness Alert   Chest Exam   Work Of Breathing / Effort Within Normal Limits   Breath Sounds   RUL Breath Sounds Clear   RML Breath Sounds Clear   RLL Breath Sounds Diminished   DO Breath Sounds Clear   LLL Breath Sounds Diminished   Oxygen   O2 Delivery Device Room air w/o2 available

## 2024-04-23 NOTE — PROGRESS NOTES
NURSING DAILY NOTE    Name: Donte Magaña   Date of Admission: 4/12/2024   Admitting Diagnosis: Acute diastolic heart failure (HCC)  Attending Physician: Lisa Mercedes D.o.  Allergies: Amiodarone, Bactrim, Cipro xr, Metoprolol, Morphine, Phytoplex z-guard [petrolatum-zinc oxide], Pseudoephedrine, Qvar [beclomethasone dipropionate], Vibramycin, Atorvastatin calcium-polysorbate 80, Augmentin, Diltiazem, Flecainide, Keflex, Mucinex, Tramadol, Atorvastatin, and Tape    Safety  Patient Assist  min-mod  Patient Precautions  Fall Risk  Precaution Comments  Pacemaker  Bed Transfer Status  Standby Assist  Toilet Transfer Status   Modified Independent  Assistive Devices  Gait Belt, Rails, Wheelchair, Wheelchair push  Oxygen  Room air w/o2 available  Diet/Therapeutic Dining  Current Diet Order   Procedures    Diet Order Diet: Regular (cruch meds that can be crushe in puree. float others one at a time,  up in wheel chair and in dining room for all meals)     Pill Administration  whole  Agitated Behavioral Scale     ABS Level of Severity       Fall Risk  Has the patient had a fall this admission?   No  Renetta Chauhan Fall Risk Scoring  22, HIGH RISK  Fall Risk Safety Measures  bed alarm, chair alarm, and poor balance    Vitals  Temperature: 36.7 °C (98.1 °F)  Temp src: Oral  Pulse: 83  Respiration: 16  Blood Pressure : 96/52  Blood Pressure MAP (Calculated): 67 MM HG  BP Location: Left, Upper Arm  Patient BP Position: Sitting     Oxygen  Pulse Oximetry: 96 %  O2 (LPM): 0  FiO2%: 21 %  O2 Delivery Device: Room air w/o2 available    Bowel and Bladder  Last Bowel Movement  04/21/24  Stool Type  Type 3: Like a sausage, but with cracks on its surface  Bowel Device  Bathroom, Diaper  Continent  Bladder:     Bowel:    Bladder Function  Urine Void (mL): 300 ml  Number of Times Voided: 1  Urine Color: Kelli  Urine Clarity: Clear  Urine Odor: Malodorous  Number of Times  Incontinent of Urine: 0  Straight Catheter: 400 ml  Genitourinary Assessment   Bladder Assessment (WDL):  WDL Except  Pruett Catheter: Not Applicable  Urine Color: Kelli  Urine Clarity: Clear  Urine Odor: Malodorous  Number of Bladder Accidents: 1  Total Number of Bladder of Accidents in Last 7 Days: 6  Number of Times Incontinent of Urine: 0  Bladder Device: Bathroom  Bladder Scan: Post Void  $ Bladder Scan Results (mL): 150    Skin  Ashutosh Score   15  Sensory Interventions   Bed Types: Low Airloss  Skin Preventative Measures: Pillows in Use for Support / Positioning  Moisture Interventions  Moisturizers/Barriers:  ([place]avilon)      Pain  Pain Rating Scale  0 - No Pain  Pain Location     Pain Location Orientation     Pain Interventions   Declines    ADLs    Bathing   Shower, Staff  Linen Change   Partial  Personal Hygiene  Moist Edie Wipes, Perineal Care  Chlorhexidine Bath      Oral Care  Brushed Teeth  Teeth/Dentures     Shave     Nutrition Percentage Eaten  *  * Meal *  *, Lunch, Between % Consumed  Environmental Precautions  Treaded Slipper Socks on Patient  Patient Turns/Positioning  Patient Turns Self from Side to Side  Patient Turns Assistance/Tolerance  Assistance of One  Bed Positions  Bed Controls On, Bed Locked  Head of Bed Elevated  Self regulated      Psychosocial/Neurologic Assessment  Psychosocial Assessment  Psychosocial (WDL):  WDL Except  Patient Behaviors: Fatigue, Forgetful  Neurologic Assessment  Neuro (WDL): Exceptions to WDL  Level of Consciousness: Alert  Orientation Level: Oriented to place, Oriented to situation, Oriented to person, Disoriented to time  Cognition: Follows commands  Speech: Clear  Pupil Assesment: No  R Pupil Size (mm): 3  R Pupil Shape / Description: Round  R Pupil Reaction: Brisk  L Pupil Size (mm): 3  L Pupil Shape / Description: Round  L Pupil Reaction: Brisk  EENT (WDL):  WDL Except    Cardio/Pulmonary Assessment  Edema      Respiratory Breath Sounds  RUL Breath  Sounds: Clear  RML Breath Sounds: Clear  RLL Breath Sounds: Diminished  DO Breath Sounds: Clear  LLL Breath Sounds: Diminished  Cardiac Assessment   Cardiac (WDL):  WDL Except (HX: AFIB, HTN)

## 2024-04-23 NOTE — CARE PLAN
The patient is Watcher - Medium risk of patient condition declining or worsening    Shift Goals  Clinical Goals: safety, sleep discharge prep  Patient Goals: go home tomorrow  Family Goals: no one present    Progress made toward(s) clinical / shift goals:   Problem: Skin Integrity  Goal: Skin integrity is maintained or improved  Outcome: Progressing  Note: Patient's skin remains intact and free from new or accidental injury this shift; no s/s of infection. RN wound protocol checked. Patient is on low air loss mattress. Pt has stage 2 pressure injury to buttocks that continues to be observed, site care provided and discharge photos taken tonight. Encouraged hydration and educated about the importance of nutrition to keep skin integrity. Encouraged pt to ask for help from daughter with applying moisturizer to legs and feet once discharged home. POC ct, call light within reach

## 2024-04-23 NOTE — PROGRESS NOTES
AILYN Bocanegra notified this RN that pt c/o bekah and back pain.   Vitals taken:  /71  P 83  SpO2 96% on RA  Upon assessment, pt placed on 2L NC as preventative   Pt denies and crushing or heart pain. States it is more generalized in chest & back; more noticeable upon inspiration.    Pt is not tachypneic, not sweating, denies SOB. Has normal work of breathing, Bilateral lower lobes sound diminished. Pt appears to be in No Acute Distress.  Charge RN Ruchi MAI made aware.   [employer] text sent to [facility] hospitalist PHYLLIS Layne (Ruchi MAI Charge RN on text too) at 0257 and then called @ 0258.  Spoke with Deric and gave SBAR. He said it doesn't sound cardiac, do EKG NOW, ordering Oxycodone 5 mg NOW - see MAR; have pt work on deep breathing using IS, text photo of EKG. If pt is worsening call him back. Verbal orders repeated back to provider. Deric stated he will input above mentioned orders into EPIC.    EKG done- Text photos via [employer]. This RN did NOT include that pt has a pacemaker (2022) in SBAR. Provider cancelled EKG orders.   Pt given IS with education and demonstration on how to properly use it.   IS reading 1250 @ 0315  Pt states she is unable to tell if there has been any relief given d/t use of IS.   Continuing to encourage pt to repeat IS (with this RN present).

## 2024-04-23 NOTE — PROGRESS NOTES
Patient discharged to home per order.  Reviewed all discharge instructions, appointments, discharge medications, and wound care instructions with patient; she verbalized understanding.  Education provided in discharge instructions about cardiac health and Home Safety and Fall Prevention. Discharge paperwork completed; signed copies in chart.  Patient has education binder and all belongings; signed copy in chart.  Pt alert, calm, stable; no change in status from morning assessment.  Patient left facility at 1610 via [facility] transport; escorted to transport vehicle by staff.  Have enjoyed working with this pleasant patient.

## 2024-04-23 NOTE — CARE PLAN
The patient is Stable - Low risk of patient condition declining or worsening    Shift Goals  Clinical Goals: Discharge  Patient Goals: Discharge  Family Goals: no one present    Progress made toward(s) clinical / shift goals:  Discharging today    Patient is not progressing towards the following goals:

## 2024-04-23 NOTE — PROGRESS NOTES
NURSING DAILY NOTE    Name: Donte Magaña   Date of Admission: 4/12/2024   Admitting Diagnosis: Acute diastolic heart failure (HCC)  Attending Physician: Lisa Mercedes D.o.  Allergies: Amiodarone, Bactrim, Cipro xr, Metoprolol, Morphine, Phytoplex z-guard [petrolatum-zinc oxide], Pseudoephedrine, Qvar [beclomethasone dipropionate], Vibramycin, Atorvastatin calcium-polysorbate 80, Augmentin, Diltiazem, Flecainide, Keflex, Mucinex, Tramadol, Atorvastatin, and Tape    Safety  Patient Assist  min-mod  Patient Precautions  Fall Risk  Precaution Comments  Pacemaker  Bed Transfer Status  Standby Assist  Toilet Transfer Status   Modified Independent  Assistive Devices  Rails, Wheelchair  Oxygen  Room air w/o2 available  Diet/Therapeutic Dining  Current Diet Order   Procedures    Diet Order Diet: Regular (cruch meds that can be crushe in puree. float others one at a time,  up in wheel chair and in dining room for all meals)     Pill Administration  whole  Agitated Behavioral Scale     ABS Level of Severity       Fall Risk  Has the patient had a fall this admission?   No  Renetta Chauhan Fall Risk Scoring  22, HIGH RISK  Fall Risk Safety Measures  bed alarm, chair alarm, and poor balance    Vitals  Temperature: 37.1 °C (98.7 °F)  Temp src: Temporal  Pulse: 80  Respiration: 16  Blood Pressure : 123/64  Blood Pressure MAP (Calculated): 84 MM HG  BP Location: Left, Upper Arm  Patient BP Position: Briceño's Position     Oxygen  Pulse Oximetry: 96 %  O2 (LPM): 0  FiO2%: 21 %  O2 Delivery Device: Room air w/o2 available    Bowel and Bladder  Last Bowel Movement  04/21/24  Stool Type  Type 3: Like a sausage, but with cracks on its surface  Bowel Device  Bathroom, Diaper  Continent  Bladder:     Bowel:    Bladder Function  Urine Void (mL):  (MOderate)  Number of Times Voided: 1  Urine Color: Yellow  Urine Clarity: Clear  Urine Odor: Malodorous  Number of Times Incontinent  of Urine: 0  Straight Catheter: 400 ml  Genitourinary Assessment   Bladder Assessment (WDL):  WDL Except  Pruett Catheter: Not Applicable  Urine Color: Yellow  Urine Clarity: Clear  Urine Odor: Malodorous  Number of Bladder Accidents: 1  Total Number of Bladder of Accidents in Last 7 Days: 6  Number of Times Incontinent of Urine: 0  Bladder Device: Bathroom  Bladder Scan: Post Void  $ Bladder Scan Results (mL): 150    Skin  Ashutosh Score   15  Sensory Interventions   Bed Types: Low Airloss  Skin Preventative Measures: Wedges in Use for Positioning  Moisture Interventions  Moisturizers/Barriers:  (Cavilon - Do NOT use barrier wipes --Soap & water only)      Pain  Pain Rating Scale  0 - No Pain  Pain Location  Back, Chest  Pain Location Orientation  Anterior, Posterior, Mid  Pain Interventions   Medication (see MAR)    ADLs    Bathing   Shower, Staff  Linen Change   Partial  Personal Hygiene  Moist Edie Wipes, Perineal Care  Chlorhexidine Bath      Oral Care  Brushed Teeth  Teeth/Dentures     Shave     Nutrition Percentage Eaten  *  * Meal *  *, Lunch, Between % Consumed  Environmental Precautions  Treaded Slipper Socks on Patient, Bed in Low Position  Patient Turns/Positioning  Patient Turns Self from Side to Side  Patient Turns Assistance/Tolerance  Assistance of One  Bed Positions  Bed Controls On, Bed Locked  Head of Bed Elevated  Self regulated      Psychosocial/Neurologic Assessment  Psychosocial Assessment  Psychosocial (WDL):  Within Defined Limits  Patient Behaviors: Fatigue, Forgetful  Neurologic Assessment  Neuro (WDL): Exceptions to WDL  Level of Consciousness: Alert  Orientation Level: Oriented to place, Oriented to situation, Oriented to person, Disoriented to time  Cognition: Follows commands  Speech: Clear  Pupil Assesment: No  R Pupil Size (mm): 3  R Pupil Shape / Description: Round  R Pupil Reaction: Brisk  L Pupil Size (mm): 3  L Pupil Shape / Description: Round  L Pupil Reaction: Brisk  EENT  (WDL):  WDL Except    Cardio/Pulmonary Assessment  Edema      Respiratory Breath Sounds  RUL Breath Sounds: Clear  RML Breath Sounds: Clear  RLL Breath Sounds: Diminished  DO Breath Sounds: Clear  LLL Breath Sounds: Diminished  Cardiac Assessment   Cardiac (WDL):  WDL Except (HX: AFIB, HTN)

## 2024-04-23 NOTE — PROGRESS NOTES
"This RN  repeated vital signs and reassessed pt.   Pt states \"no pain now.\"   /64  Pulse 80  SpO2 96% RA  Resp. 16  Pt appears to be in no acute distress.   Per Deric Dorsey, notify if pt does not improve.   Continuing to re-evaluate.   "

## 2024-04-24 ENCOUNTER — PATIENT OUTREACH (OUTPATIENT)
Dept: MEDICAL GROUP | Facility: MEDICAL CENTER | Age: 84
End: 2024-04-24
Payer: MEDICARE

## 2024-04-27 ENCOUNTER — HOME CARE VISIT (OUTPATIENT)
Dept: HOME HEALTH SERVICES | Facility: HOME HEALTHCARE | Age: 84
End: 2024-04-27
Payer: MEDICARE

## 2024-04-27 VITALS
SYSTOLIC BLOOD PRESSURE: 128 MMHG | WEIGHT: 170 LBS | TEMPERATURE: 97.8 F | OXYGEN SATURATION: 91 % | RESPIRATION RATE: 18 BRPM | HEIGHT: 63 IN | DIASTOLIC BLOOD PRESSURE: 60 MMHG | BODY MASS INDEX: 30.12 KG/M2 | HEART RATE: 80 BPM

## 2024-04-27 PROCEDURE — G0299 HHS/HOSPICE OF RN EA 15 MIN: HCPCS

## 2024-04-27 PROCEDURE — 665005 NO-PAY RAP - HOME HEALTH

## 2024-04-27 SDOH — ECONOMIC STABILITY: HOUSING INSECURITY: EVIDENCE OF SMOKING MATERIAL: 0

## 2024-04-27 ASSESSMENT — ENCOUNTER SYMPTOMS
BOWEL PATTERN NORMAL: 1
LAST BOWEL MOVEMENT: 66957
PAIN LOCATION - PAIN QUALITY: DULL , ACHY
DESCRIPTION OF MEMORY LOSS: SHORT TERM
FATIGUE: 1
DYSPNEA ACTIVITY LEVEL: AFTER AMBULATING 10 - 20 FT
LOWEST PAIN SEVERITY IN PAST 24 HOURS: 2/10
PAIN LOCATION - RELIEVING FACTORS: TYLENOL
DRY SKIN: 1
PAIN LOCATION - PAIN SEVERITY: 2/10
PAIN SEVERITY GOAL: 1/10
NAUSEA: DENIES
VOMITING: DENIES
STOOL FREQUENCY: DAILY
PAIN LOCATION - PAIN FREQUENCY: FREQUENT
PERSON REPORTING PAIN: PATIENT
PAIN LOCATION: RIGHT SHOULDER
FORGETFULNESS: 1
LOWER EXTREMITY EDEMA: 1
SHORTNESS OF BREATH: 1
PAIN: 1
ASSOCIATED SYMPTOMS: DENIES
HIGHEST PAIN SEVERITY IN PAST 24 HOURS: 3/10
SUBJECTIVE PAIN PROGRESSION: UNCHANGED

## 2024-04-27 ASSESSMENT — ACTIVITIES OF DAILY LIVING (ADL)
FEEDING: INDEPENDENT
PHYSICAL TRANSFERS ASSESSED: 1
OASIS_M1830: 01
CURRENT_FUNCTION: SUPERVISION
FEEDING ASSESSED: 1

## 2024-04-27 ASSESSMENT — FIBROSIS 4 INDEX: FIB4 SCORE: 1.8

## 2024-04-27 NOTE — Clinical Note
SOC done, reviewed / reconciled medications, Drug-Drug: sertraline and mirtazapine   Additive serotonergic effects may occur during coadministration of selective serotonin reuptake inhibitors (SSRIs) and mirtazapine, and the risk of developing serotonin syndrome may be increased.   Details

## 2024-04-27 NOTE — Clinical Note
SOC  DX-Atrial Fib, CHF, HTN, P.U. to buttocks  Disciplines-SN, PT, OT, ST, Refused HHA  Insurance-SCP  Cert period-4/27/24 through 6/25/24  Comments-please bring weight scale next snv

## 2024-04-29 ENCOUNTER — DOCUMENTATION (OUTPATIENT)
Dept: MEDICAL GROUP | Facility: PHYSICIAN GROUP | Age: 84
End: 2024-04-29

## 2024-04-29 NOTE — PROGRESS NOTES
Medication chart review for [facility] services    Received referral from [facility].   Medications reviewed  compared with discharge summary if available.  Discharge summary date, if applicable:   4/23/24    Current medication list per [facility]     Medication list one, patient is currently taking    Current Outpatient Medications:     furosemide, 40 mg, Oral, BID    lisinopril, 5 mg, Oral, DAILY    Magnesium Oxide (Antacid), 400 mg, Oral, DAILY    Myrbetriq, 50 mg, Oral, DAILY    Home Care Oxygen, 1-3 L/min, Inhalation, PRN    atenolol, 50 mg, Oral, Q EVENING    fluticasone-umeclidinium-vilanterol, 1 Puff, Inhalation, DAILY    levothyroxine, 37.5 mcg, Oral, AM ES (Patient taking differently: 50 Tablet, Oral, EACH MORNING ON EMPTY STOMACH, Indications: Hypothyroidism)    vibegron, 75 mg, Oral, DAILY (Patient not taking: Reported on 4/27/2024)    mirtazapine, 15 mg, Oral, QHS (Patient not taking: Reported on 4/27/2024)    rivaroxaban, 15 mg, Oral, PM MEAL (Patient taking differently: 20 mg, Oral, WITH PM MEAL, Indications: Atrial Fibrillation with Embolization)    sertraline, 50 mg, Oral, QDAY    montelukast, 10 mg, Oral, Nightly (Patient not taking: Reported on 4/27/2024)    Acetaminophen, 2 Capsule, Oral, TID PRN      Medication list two, drugs that the patient has been prescribed or recommended to take by their healthcare provider on discharge summary  edication List          START taking these medications         Instructions   montelukast 10 MG Tabs  Commonly known as: Singulair  Notes to patient: Allergy     Take 1 Tablet by mouth every evening.  Dose: 10 mg      vibegron 75 MG tablet  Commonly known as: Gemtesa  Notes to patient: Urinary incontinence    Take 1 Tablet by mouth every day. Indications: Urinary Incontinence  Dose: 75 mg                CHANGE how you take these medications         Instructions   levothyroxine 25 MCG Tabs  What changed:   medication strength  how much to take  Commonly  known as: Synthroid  Notes to patient: Thyroid support     Take 1.5 Tablets by mouth every morning on an empty stomach. Indications: Underactive Thyroid  Dose: 37.5 mcg      rivaroxaban 15 MG Tabs tablet  What changed:   medication strength  how much to take  Commonly known as: Xarelto  Notes to patient: Heart  a-fib     Take 1 Tablet by mouth with dinner. Indications: Atrial Fibrillation with Blood Vessel Occlusion Process  Dose: 15 mg                CONTINUE taking these medications         Instructions   Acetaminophen 500 MG Caps  Notes to patient: Pain     Take 2 Caps by mouth 3 times a day as needed. Indications: Pain  Dose: 2 Capsule      atenolol 50 MG Tabs  Commonly known as: Tenormin  Notes to patient: Blood pressure     Take 1 Tablet by mouth every evening. Indications: High Blood Pressure Disorder  Dose: 50 mg      fluticasone-umeclidinium-vilanterol 100-62.5-25 mcg/act inhaler  Commonly known as: Trelegy Ellipta  Notes to patient: allergies    Inhale 1 Puff every day.  Dose: 1 Puff      mirtazapine 15 MG Tabs  Commonly known as: Remeron  Notes to patient: Depression, anxiety, insomnia     Take 1 Tablet by mouth at bedtime.  Dose: 15 mg      sertraline 50 MG Tabs  Commonly known as: Zoloft  Notes to patient: Depression     Take 1 Tablet by mouth every day. Indications: Major Depressive Disorder  Dose: 50 mg                STOP taking these medications       azithromycin 500 MG tablet  Commonly known as: Zithromax      benzonatate 100 MG Caps  Commonly known as: Tessalon      Dextromethorphan Polistirex ER 30 MG/5ML Suer  Commonly known as: Delsym      diphenhydrAMINE 25 MG Tabs  Commonly known as: Benadryl      DIPHENHYDRAMINE HCL (TOPICAL) 2 % Gel      Fluticasone Propionate (Inhal) 50 MCG/ACT Aepb      furosemide 40 MG Tabs  Commonly known as: Lasix      HCA TRIPLE ANTIBIOTIC OINTMENT EX      Lidocaine 4 % Aero      lisinopril 5 MG Tabs  Commonly known as: Prinivil      Lutein 20 MG Caps      magnesium  "oxide 400 MG Tabs tablet  Commonly known as: Mag-Ox      PREBIOTIC PRODUCT PO      predniSONE 20 MG Tabs  Commonly known as: Deltasone      SENOKOT PO      spironolactone 25 MG Tabs  Commonly known as: Aldactone      trolamine salicylate 10 % cream  Commonly known as: Aspercreme Or Myoflex      Tums 500 MG Chew  Generic drug: calcium carbonate      vitamin D 2000 UNIT Tabs      Voltaren 1 % Gel  Generic drug: diclofenac sodium       Allergies   Allergen Reactions    Amiodarone Hives     Throat and tongue itching    Bactrim Shortness of Breath    Cipro Xr Swelling    Metoprolol Swelling     Causes throat swelling    Morphine Unspecified     Hallucinations    Phytoplex Z-Guard [Petrolatum-Zinc Oxide] Unspecified     \"causes burning\"    Pseudoephedrine Palpitations    Qvar [Beclomethasone Dipropionate] Unspecified     Pressure on heart      Vibramycin Shortness of Breath    Atorvastatin Calcium-Polysorbate 80 Unspecified     Muscle aches      Augmentin Unspecified     Unknown reaction    Diltiazem Rash     rash    Flecainide Unspecified     dizziness    Keflex Unspecified     Pt states \"Unsure\".    Mucinex Unspecified     GI Distress      Tramadol Unspecified     crying    Atorvastatin Myalgia    Tape Rash     Paper tape okay       Labs     Lab Results   Component Value Date/Time    SODIUM 138 04/22/2024 05:53 AM    POTASSIUM 4.4 04/22/2024 05:53 AM    CHLORIDE 104 04/22/2024 05:53 AM    CO2 23 04/22/2024 05:53 AM    GLUCOSE 144 (H) 04/22/2024 05:53 AM    BUN 33 (H) 04/22/2024 05:53 AM    CREATININE 0.84 04/22/2024 05:53 AM    CREATININE 0.78 07/29/2010 12:00 AM    BUNCREATRAT 17 07/29/2010 12:00 AM    GLOMRATE >59 07/29/2010 12:00 AM     Lab Results   Component Value Date/Time    ALKPHOSPHAT 69 04/13/2024 06:15 AM    ASTSGOT 16 04/13/2024 06:15 AM    ALTSGPT 7 04/13/2024 06:15 AM    TBILIRUBIN 0.5 04/13/2024 06:15 AM    INR 0.99 01/16/2023 01:43 PM    ALBUMIN 3.4 04/13/2024 06:15 AM        Assessment for clinically " significant drug interactions, drug omissions/additions, duplicative therapies.            CC   Jolie Escobar M.D.  [address]  [place] [address]  Fax: [phone]    [facility]  Phone [phone] fax [phone]    This note was created using voice recognition software (Dragon). The accuracy of the dictation is limited by the abilities of the software. I have reviewed the note prior to signing, however some errors in grammar and context are still possible. If you have any questions related to this note please do not hesitate to contact our office.

## 2024-04-29 NOTE — CASE COMMUNICATION
noted  ----- Message -----  From: Yecenia Morocho R.N.  Sent: 4/27/2024  10:37 PM PDT  To: Chela Mohamud R.N.; Sneha Medina R.N.; *      SOC  DX-Atrial Fib, CHF, HTN, P.U. to buttocks  Disciplines-SN, PT, OT, ST, Refused HHA  Insurance-SCP  Cert period-4/27/24 through 6/25/24  Comments-please bring weight scale next snv

## 2024-04-30 ENCOUNTER — HOME CARE VISIT (OUTPATIENT)
Dept: HOME HEALTH SERVICES | Facility: HOME HEALTHCARE | Age: 84
End: 2024-04-30
Payer: MEDICARE

## 2024-04-30 VITALS
TEMPERATURE: 97.5 F | HEART RATE: 76 BPM | DIASTOLIC BLOOD PRESSURE: 56 MMHG | WEIGHT: 169 LBS | OXYGEN SATURATION: 97 % | BODY MASS INDEX: 29.94 KG/M2 | RESPIRATION RATE: 18 BRPM | SYSTOLIC BLOOD PRESSURE: 110 MMHG

## 2024-04-30 PROCEDURE — G0299 HHS/HOSPICE OF RN EA 15 MIN: HCPCS

## 2024-04-30 ASSESSMENT — ENCOUNTER SYMPTOMS
FATIGUE: 1
LOWER EXTREMITY EDEMA: 1
FATIGUES EASILY: 1
PERSON REPORTING PAIN: PATIENT
STOOL FREQUENCY: DAILY
PAIN SEVERITY GOAL: 0/10
HIGHEST PAIN SEVERITY IN PAST 24 HOURS: 0/10
DESCRIPTION OF MEMORY LOSS: SHORT TERM
NAUSEA: DENIES
LOWEST PAIN SEVERITY IN PAST 24 HOURS: 0/10
VOMITING: DENIES
PAIN: 1
MUSCLE WEAKNESS: 1
LAST BOWEL MOVEMENT: 66960
BOWEL PATTERN NORMAL: 1

## 2024-04-30 ASSESSMENT — FIBROSIS 4 INDEX: FIB4 SCORE: 1.8

## 2024-04-30 ASSESSMENT — PATIENT HEALTH QUESTIONNAIRE - PHQ9: CLINICAL INTERPRETATION OF PHQ2 SCORE: 0

## 2024-05-01 ENCOUNTER — HOME CARE VISIT (OUTPATIENT)
Dept: HOME HEALTH SERVICES | Facility: HOME HEALTHCARE | Age: 84
End: 2024-05-01
Payer: MEDICARE

## 2024-05-01 VITALS
TEMPERATURE: 97.6 F | SYSTOLIC BLOOD PRESSURE: 118 MMHG | HEART RATE: 78 BPM | DIASTOLIC BLOOD PRESSURE: 60 MMHG | RESPIRATION RATE: 18 BRPM | OXYGEN SATURATION: 96 %

## 2024-05-01 ASSESSMENT — ACTIVITIES OF DAILY LIVING (ADL)
AMBULATION ASSISTANCE: ONE PERSON
CURRENT_FUNCTION: STAND BY ASSIST
CURRENT_FUNCTION: ONE PERSON
AMBULATION ASSISTANCE: STAND BY ASSIST

## 2024-05-01 ASSESSMENT — ENCOUNTER SYMPTOMS
POOR JUDGMENT: 1
PAIN: 1
MUSCLE WEAKNESS: 1
PAIN LOCATION: RIGHT SHOULDER
PAIN LOCATION - PAIN FREQUENCY: FREQUENT
PAIN LOCATION - PAIN SEVERITY: 3/10
ARTHRALGIAS: 1
PAIN LOCATION - PAIN QUALITY: DULL
PERSON REPORTING PAIN: PATIENT

## 2024-05-01 ASSESSMENT — PATIENT HEALTH QUESTIONNAIRE - PHQ9: CLINICAL INTERPRETATION OF PHQ2 SCORE: 0

## 2024-05-01 NOTE — Clinical Note
PT evaluation completed, requesting follow up visits with frequency of 1w1,2w3 effective 05/01/2024.

## 2024-05-02 ENCOUNTER — HOME CARE VISIT (OUTPATIENT)
Dept: HOME HEALTH SERVICES | Facility: HOME HEALTHCARE | Age: 84
End: 2024-05-02
Payer: MEDICARE

## 2024-05-02 VITALS
TEMPERATURE: 97.3 F | DIASTOLIC BLOOD PRESSURE: 64 MMHG | RESPIRATION RATE: 16 BRPM | HEART RATE: 81 BPM | SYSTOLIC BLOOD PRESSURE: 108 MMHG | OXYGEN SATURATION: 98 %

## 2024-05-02 SDOH — ECONOMIC STABILITY: HOUSING INSECURITY: EVIDENCE OF SMOKING MATERIAL: 0

## 2024-05-02 ASSESSMENT — ACTIVITIES OF DAILY LIVING (ADL)
TRANSPORTATION ASSESSED: 1
FEEDING: INDEPENDENT
FEEDING ASSESSED: 1
USING THE TELPHONE: INDEPENDENT
TRANSPORTATION: DEPENDENT
FEEDING_WITHIN_DEFINED_LIMITS: 1
HOUSEKEEPING ASSESSED: 1
DRESSING_LB_CURRENT_FUNCTION: INDEPENDENT
TOILETING: 1
GROOMING_CURRENT_FUNCTION: INDEPENDENT
WASHING_LB_CURRENT_FUNCTION: SUPERVISION
BATHING ASSESSED: 1
LIGHT HOUSEKEEPING: DEPENDENT
AMBULATION ASSISTANCE: INDEPENDENT
LAUNDRY ASSESSED: 1
ORAL_CARE_ASSESSED: 1
PHYSICAL TRANSFERS ASSESSED: 1
SHOPPING ASSESSED: 1
TOILETING: INDEPENDENT
CURRENT_FUNCTION: INDEPENDENT
LAUNDRY: DEPENDENT
GROOMING ASSESSED: 1
BATHING_CURRENT_FUNCTION: SUPERVISION
GROOMING_WITHIN_DEFINED_LIMITS: 1
TELEPHONE USE ASSESSED: 1
AMBULATION ASSISTANCE: 1
PREPARING MEALS: NEEDS ASSISTANCE
DRESSING_UB_CURRENT_FUNCTION: INDEPENDENT
WASHING_UPB_CURRENT_FUNCTION: SUPERVISION
SHOPPING: DEPENDENT
ORAL_CARE_CURRENT_FUNCTION: INDEPENDENT

## 2024-05-02 ASSESSMENT — ENCOUNTER SYMPTOMS
DENIES PAIN: 1
DIFFICULTY THINKING: 1
PERSON REPORTING PAIN: PATIENT

## 2024-05-02 NOTE — CASE COMMUNICATION
OT completed initial evaluation 5/2/24.  OT will see client 1W3 for HEP, safety, and kitchen mobility.  She refused need to work on safety with showering.

## 2024-05-02 NOTE — CASE COMMUNICATION
noted  ----- Message -----  From: Adalberto Mello, PT  Sent: 5/1/2024   9:13 PM PDT  To: Chela Mohamud R.N.; Yudi Sauceda, SLP; *      PT evaluation completed, requesting follow up visits with frequency of 1w1,2w3 effective 05/01/2024.

## 2024-05-03 ENCOUNTER — HOME CARE VISIT (OUTPATIENT)
Dept: HOME HEALTH SERVICES | Facility: HOME HEALTHCARE | Age: 84
End: 2024-05-03
Payer: MEDICARE

## 2024-05-03 ASSESSMENT — FIBROSIS 4 INDEX: FIB4 SCORE: 1.82

## 2024-05-03 NOTE — Clinical Note
Pt ambulates using walker, alert and oriented x3, forgtetful, not using oxygen. states she uses it as needed . . Denies shortness of breath or pain. Medications are managed by staff , needs trelligy , staff will send communication with PCP. Teachings done CHF protocol, safety precautions, bleeding ,fall and  pressure ulcer prevention. Pt forgetful, states she will try to remember. Reports no chest pains ,bleeding or falls.

## 2024-05-04 ENCOUNTER — APPOINTMENT (OUTPATIENT)
Dept: RADIOLOGY | Facility: MEDICAL CENTER | Age: 84
DRG: 683 | End: 2024-05-04
Attending: EMERGENCY MEDICINE
Payer: MEDICARE

## 2024-05-04 ENCOUNTER — HOSPITAL ENCOUNTER (INPATIENT)
Facility: MEDICAL CENTER | Age: 84
LOS: 2 days | DRG: 683 | End: 2024-05-06
Attending: EMERGENCY MEDICINE | Admitting: HOSPITALIST
Payer: MEDICARE

## 2024-05-04 VITALS
DIASTOLIC BLOOD PRESSURE: 62 MMHG | OXYGEN SATURATION: 99 % | SYSTOLIC BLOOD PRESSURE: 110 MMHG | WEIGHT: 169.8 LBS | BODY MASS INDEX: 30.08 KG/M2 | RESPIRATION RATE: 17 BRPM | HEART RATE: 84 BPM | TEMPERATURE: 97.4 F

## 2024-05-04 DIAGNOSIS — J96.01 ACUTE RESPIRATORY FAILURE WITH HYPOXIA (HCC): ICD-10-CM

## 2024-05-04 DIAGNOSIS — Z74.09 IMPAIRED MOBILITY: ICD-10-CM
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
## 2024-05-04 DIAGNOSIS — M54.50 ACUTE MIDLINE LOW BACK PAIN WITHOUT SCIATICA: ICD-10-CM

## 2024-05-04 DIAGNOSIS — R41.3 MEMORY IMPAIRMENT: ICD-10-CM

## 2024-05-04 DIAGNOSIS — R53.1 GENERALIZED WEAKNESS: ICD-10-CM

## 2024-05-04 DIAGNOSIS — M54.9 INTRACTABLE BACK PAIN: ICD-10-CM

## 2024-05-04 PROBLEM — D35.00 INCIDENTAL ADRENAL CORTICAL ADENOMA: Status: ACTIVE | Noted: 2024-05-04

## 2024-05-04 PROBLEM — S31.000A SACRAL WOUND: Status: ACTIVE | Noted: 2024-05-04

## 2024-05-04 PROBLEM — N17.9 AKI (ACUTE KIDNEY INJURY) (HCC): Status: ACTIVE | Noted: 2024-05-04

## 2024-05-04 LAB
ANION GAP SERPL CALC-SCNC: 16 MMOL/L (ref 7–16)
APPEARANCE UR: CLEAR
APTT PPP: 40.5 SEC (ref 24.7–36)
BASOPHILS # BLD AUTO: 0.5 % (ref 0–1.8)
BASOPHILS # BLD: 0.06 K/UL (ref 0–0.12)
BILIRUB UR QL STRIP.AUTO: NEGATIVE
BUN SERPL-MCNC: 44 MG/DL (ref 8–22)
CALCIUM SERPL-MCNC: 9.8 MG/DL (ref 8.5–10.5)
CHLORIDE SERPL-SCNC: 99 MMOL/L (ref 96–112)
CO2 SERPL-SCNC: 23 MMOL/L (ref 20–33)
COLOR UR: YELLOW
CREAT SERPL-MCNC: 1.37 MG/DL (ref 0.5–1.4)
EOSINOPHIL # BLD AUTO: 0.79 K/UL (ref 0–0.51)
EOSINOPHIL NFR BLD: 6.5 % (ref 0–6.9)
ERYTHROCYTE [DISTWIDTH] IN BLOOD BY AUTOMATED COUNT: 50.1 FL (ref 35.9–50)
GFR SERPLBLD CREATININE-BSD FMLA CKD-EPI: 38 ML/MIN/1.73 M 2
GLUCOSE SERPL-MCNC: 123 MG/DL (ref 65–99)
GLUCOSE UR STRIP.AUTO-MCNC: NEGATIVE MG/DL
HCT VFR BLD AUTO: 44.6 % (ref 37–47)
HGB BLD-MCNC: 14.2 G/DL (ref 12–16)
IMM GRANULOCYTES # BLD AUTO: 0.07 K/UL (ref 0–0.11)
IMM GRANULOCYTES NFR BLD AUTO: 0.6 % (ref 0–0.9)
INR PPP: 1.53 (ref 0.87–1.13)
KETONES UR STRIP.AUTO-MCNC: NEGATIVE MG/DL
LEUKOCYTE ESTERASE UR QL STRIP.AUTO: NEGATIVE
LYMPHOCYTES # BLD AUTO: 2.44 K/UL (ref 1–4.8)
LYMPHOCYTES NFR BLD: 20.1 % (ref 22–41)
MCH RBC QN AUTO: 31.6 PG (ref 27–33)
MCHC RBC AUTO-ENTMCNC: 31.8 G/DL (ref 32.2–35.5)
MCV RBC AUTO: 99.1 FL (ref 81.4–97.8)
MICRO URNS: NORMAL
MONOCYTES # BLD AUTO: 0.72 K/UL (ref 0–0.85)
MONOCYTES NFR BLD AUTO: 5.9 % (ref 0–13.4)
NEUTROPHILS # BLD AUTO: 8.04 K/UL (ref 1.82–7.42)
NEUTROPHILS NFR BLD: 66.4 % (ref 44–72)
NITRITE UR QL STRIP.AUTO: NEGATIVE
NRBC # BLD AUTO: 0 K/UL
NRBC BLD-RTO: 0 /100 WBC (ref 0–0.2)
PH UR STRIP.AUTO: 7.5 [PH] (ref 5–8)
PLATELET # BLD AUTO: 282 K/UL (ref 164–446)
PMV BLD AUTO: 9.9 FL (ref 9–12.9)
POTASSIUM SERPL-SCNC: 4.7 MMOL/L (ref 3.6–5.5)
PROT UR QL STRIP: NEGATIVE MG/DL
PROTHROMBIN TIME: 18.6 SEC (ref 12–14.6)
RBC # BLD AUTO: 4.5 M/UL (ref 4.2–5.4)
RBC UR QL AUTO: NEGATIVE
SODIUM SERPL-SCNC: 138 MMOL/L (ref 135–145)
SP GR UR STRIP.AUTO: 1.01
T PALLIDUM AB SER QL IA: NORMAL
UROBILINOGEN UR STRIP.AUTO-MCNC: 0.2 MG/DL
VIT B12 SERPL-MCNC: 362 PG/ML (ref 211–911)
WBC # BLD AUTO: 12.1 K/UL (ref 4.8–10.8)

## 2024-05-04 PROCEDURE — 99222 1ST HOSP IP/OBS MODERATE 55: CPT | Mod: AI,GC | Performed by: HOSPITALIST

## 2024-05-04 RX ORDER — LEVOTHYROXINE SODIUM 0.05 MG/1
50 TABLET ORAL
COMMUNITY

## 2024-05-04 RX ORDER — MAGNESIUM OXIDE 400 MG/1
400 TABLET ORAL DAILY
COMMUNITY

## 2024-05-04 RX ORDER — SODIUM CHLORIDE, SODIUM LACTATE, POTASSIUM CHLORIDE, CALCIUM CHLORIDE 600; 310; 30; 20 MG/100ML; MG/100ML; MG/100ML; MG/100ML
INJECTION, SOLUTION INTRAVENOUS CONTINUOUS
Status: ACTIVE | OUTPATIENT
Start: 2024-05-04 | End: 2024-05-05

## 2024-05-04 RX ORDER — HYDROMORPHONE HYDROCHLORIDE 1 MG/ML
0.25 INJECTION, SOLUTION INTRAMUSCULAR; INTRAVENOUS; SUBCUTANEOUS
Status: DISCONTINUED | OUTPATIENT
Start: 2024-05-04 | End: 2024-05-06 | Stop reason: HOSPADM

## 2024-05-04 RX ORDER — ACETAMINOPHEN 500 MG
1000 TABLET ORAL
Status: DISCONTINUED | OUTPATIENT
Start: 2024-05-04 | End: 2024-05-06 | Stop reason: HOSPADM

## 2024-05-04 RX ORDER — UREA 10 %
500 LOTION (ML) TOPICAL DAILY
Status: DISCONTINUED | OUTPATIENT
Start: 2024-05-04 | End: 2024-05-06 | Stop reason: HOSPADM

## 2024-05-04 RX ORDER — OXYCODONE HYDROCHLORIDE 5 MG/1
5 TABLET ORAL
Status: DISCONTINUED | OUTPATIENT
Start: 2024-05-04 | End: 2024-05-06 | Stop reason: HOSPADM

## 2024-05-04 RX ORDER — MIRABEGRON 50 MG/1
50 TABLET, EXTENDED RELEASE ORAL DAILY
Status: DISCONTINUED | OUTPATIENT
Start: 2024-05-04 | End: 2024-05-04

## 2024-05-04 RX ORDER — OXYCODONE HYDROCHLORIDE 5 MG/1
2.5 TABLET ORAL
Status: DISCONTINUED | OUTPATIENT
Start: 2024-05-04 | End: 2024-05-06 | Stop reason: HOSPADM

## 2024-05-04 RX ORDER — POLYETHYLENE GLYCOL 3350 17 G/17G
1 POWDER, FOR SOLUTION ORAL
Status: DISCONTINUED | OUTPATIENT
Start: 2024-05-04 | End: 2024-05-06 | Stop reason: HOSPADM

## 2024-05-04 RX ORDER — AMOXICILLIN 250 MG
2 CAPSULE ORAL EVERY EVENING
Status: DISCONTINUED | OUTPATIENT
Start: 2024-05-04 | End: 2024-05-06 | Stop reason: HOSPADM

## 2024-05-04 RX ORDER — LEVOTHYROXINE SODIUM 0.05 MG/1
50 TABLET ORAL
Status: DISCONTINUED | OUTPATIENT
Start: 2024-05-05 | End: 2024-05-06 | Stop reason: HOSPADM

## 2024-05-04 RX ORDER — ATENOLOL 50 MG/1
50 TABLET ORAL EVERY EVENING
Status: DISCONTINUED | OUTPATIENT
Start: 2024-05-04 | End: 2024-05-06 | Stop reason: HOSPADM

## 2024-05-04 RX ORDER — ACETAMINOPHEN 325 MG/1
650 TABLET ORAL EVERY 6 HOURS PRN
Status: DISCONTINUED | OUTPATIENT
Start: 2024-05-04 | End: 2024-05-04

## 2024-05-04 RX ORDER — LISINOPRIL 5 MG/1
5 TABLET ORAL DAILY
Status: DISCONTINUED | OUTPATIENT
Start: 2024-05-05 | End: 2024-05-06 | Stop reason: HOSPADM

## 2024-05-04 RX ORDER — LANOLIN ALCOHOL/MO/W.PET/CERES
400 CREAM (GRAM) TOPICAL DAILY
Status: DISCONTINUED | OUTPATIENT
Start: 2024-05-05 | End: 2024-05-06 | Stop reason: HOSPADM

## 2024-05-04 RX ADMIN — ACETAMINOPHEN 1000 MG: 500 TABLET, FILM COATED ORAL at 20:35

## 2024-05-04 RX ADMIN — SERTRALINE HYDROCHLORIDE 50 MG: 50 TABLET ORAL at 18:47

## 2024-05-04 RX ADMIN — RIVAROXABAN 15 MG: 15 TABLET, FILM COATED ORAL at 18:47

## 2024-05-04 RX ADMIN — SODIUM CHLORIDE, POTASSIUM CHLORIDE, SODIUM LACTATE AND CALCIUM CHLORIDE: 600; 310; 30; 20 INJECTION, SOLUTION INTRAVENOUS at 18:52

## 2024-05-04 RX ADMIN — ATENOLOL 50 MG: 50 TABLET ORAL at 18:47

## 2024-05-04 SDOH — ECONOMIC STABILITY: HOUSING INSECURITY: EVIDENCE OF SMOKING MATERIAL: 0

## 2024-05-04 SDOH — ECONOMIC STABILITY: HOUSING INSECURITY: HOME SAFETY: PT TEACHING ON OXYGEN PRECAUTIONS AND FIRE PRECAUTIONS. SHOWED HOW TO TURN ON O2 CONCENTRATOR,

## 2024-05-04 ASSESSMENT — ENCOUNTER SYMPTOMS
WHEEZING: 0
DRY SKIN: 1
DESCRIPTION OF MEMORY LOSS: LONG TERM
MEMORY LOSS: 1
BACK PAIN: 1
ABDOMINAL PAIN: 0
WEIGHT LOSS: 0
DIZZINESS: 0
SHORTNESS OF BREATH: 0
PHOTOPHOBIA: 0
ARTHRALGIAS: 1
CHILLS: 0
SORE THROAT: 0
BOWEL PATTERN NORMAL: 1
NERVOUS/ANXIOUS: 0
MUSCLE WEAKNESS: 1
NECK PAIN: 0
DOUBLE VISION: 0
HALLUCINATIONS: 0
ORTHOPNEA: 0
DEPRESSION: 0
DENIES PAIN: 1
NAUSEA: 0
DESCRIPTION OF MEMORY LOSS: SHORT TERM
FEVER: 0
SPUTUM PRODUCTION: 0
DIARRHEA: 0
PERSON REPORTING PAIN: PATIENT
TINGLING: 0
WEAKNESS: 0
FATIGUES EASILY: 1
LOSS OF CONSCIOUSNESS: 0
LOWER EXTREMITY EDEMA: 1
FATIGUE: 1
SEIZURES: 0
HEADACHES: 0
BLURRED VISION: 0
HEMOPTYSIS: 0
PALPITATIONS: 0
COUGH: 0
POOR JUDGMENT: 1
LAST BOWEL MOVEMENT: 66963
BLOOD IN STOOL: 0
VOMITING: 0
LIMITED RANGE OF MOTION: 1
EYE PAIN: 0
FOCAL WEAKNESS: 0
FORGETFULNESS: 1
SINUS PAIN: 0
SHORTNESS OF BREATH: 1
FALLS: 1

## 2024-05-04 ASSESSMENT — COGNITIVE AND FUNCTIONAL STATUS - GENERAL
WALKING IN HOSPITAL ROOM: A LOT
MOVING TO AND FROM BED TO CHAIR: A LOT
DAILY ACTIVITIY SCORE: 12
MOBILITY SCORE: 12
CLIMB 3 TO 5 STEPS WITH RAILING: A LOT
HELP NEEDED FOR BATHING: A LOT
MOVING FROM LYING ON BACK TO SITTING ON SIDE OF FLAT BED: A LOT
TOILETING: A LOT
EATING MEALS: A LOT
STANDING UP FROM CHAIR USING ARMS: A LOT
PERSONAL GROOMING: A LOT
TURNING FROM BACK TO SIDE WHILE IN FLAT BAD: A LOT
DRESSING REGULAR UPPER BODY CLOTHING: A LOT
DRESSING REGULAR LOWER BODY CLOTHING: A LOT
SUGGESTED CMS G CODE MODIFIER DAILY ACTIVITY: CL
SUGGESTED CMS G CODE MODIFIER MOBILITY: CL

## 2024-05-04 ASSESSMENT — LIFESTYLE VARIABLES
HAVE YOU EVER FELT YOU SHOULD CUT DOWN ON YOUR DRINKING: NO
TOTAL SCORE: 0
ALCOHOL_USE: NO
SUBSTANCE_ABUSE: 0
AVERAGE NUMBER OF DAYS PER WEEK YOU HAVE A DRINK CONTAINING ALCOHOL: 0
TOTAL SCORE: 0
EVER HAD A DRINK FIRST THING IN THE MORNING TO STEADY YOUR NERVES TO GET RID OF A HANGOVER: NO
HAVE PEOPLE ANNOYED YOU BY CRITICIZING YOUR DRINKING: NO
CONSUMPTION TOTAL: NEGATIVE
HOW MANY TIMES IN THE PAST YEAR HAVE YOU HAD 5 OR MORE DRINKS IN A DAY: 0
EVER FELT BAD OR GUILTY ABOUT YOUR DRINKING: NO
ON A TYPICAL DAY WHEN YOU DRINK ALCOHOL HOW MANY DRINKS DO YOU HAVE: 0
TOTAL SCORE: 0
DOES PATIENT WANT TO STOP DRINKING: NO

## 2024-05-04 ASSESSMENT — CHA2DS2 SCORE
DIABETES: NO
SEX: FEMALE
VASCULAR DISEASE: YES
PRIOR STROKE OR TIA OR THROMBOEMBOLISM: NO
CHA2DS2 VASC SCORE: 5
CHF OR LEFT VENTRICULAR DYSFUNCTION: NO
AGE 75 OR GREATER: YES
HYPERTENSION: YES
AGE 65 TO 74: NO

## 2024-05-04 ASSESSMENT — PATIENT HEALTH QUESTIONNAIRE - PHQ9
1. LITTLE INTEREST OR PLEASURE IN DOING THINGS: NOT AT ALL
2. FEELING DOWN, DEPRESSED, IRRITABLE, OR HOPELESS: NOT AT ALL
1. LITTLE INTEREST OR PLEASURE IN DOING THINGS: NOT AT ALL
CLINICAL INTERPRETATION OF PHQ2 SCORE: 0
SUM OF ALL RESPONSES TO PHQ9 QUESTIONS 1 AND 2: 0
SUM OF ALL RESPONSES TO PHQ9 QUESTIONS 1 AND 2: 0
2. FEELING DOWN, DEPRESSED, IRRITABLE, OR HOPELESS: NOT AT ALL

## 2024-05-04 ASSESSMENT — FIBROSIS 4 INDEX: FIB4 SCORE: 1.8

## 2024-05-04 ASSESSMENT — PAIN DESCRIPTION - PAIN TYPE: TYPE: ACUTE PAIN

## 2024-05-04 ASSESSMENT — PAIN SCALES - PAIN ASSESSMENT IN ADVANCED DEMENTIA (PAINAD)
BODYLANGUAGE: 0 - RELAXED.
FACIALEXPRESSION: 0 - SMILING OR INEXPRESSIVE.
CONSOLABILITY: 0 - NO NEED TO CONSOLE.
NEGVOCALIZATION: 0 - NONE.
TOTALSCORE: 0

## 2024-05-04 NOTE — ASSESSMENT & PLAN NOTE
Likely mechanical given patient was getting up holding on towel bar that broke off  and lost balance  -Fall precautions  -Pain control  -PT/OT, recommended HH  -PM&R consulted, pending

## 2024-05-04 NOTE — ED NOTES
Report to Damian DELGADO, POC discussed.  Pt to go to room [facility] [id number]

## 2024-05-04 NOTE — ASSESSMENT & PLAN NOTE
Chronic at baseline. TSH low with normal T4,  TS   -B12 lower normal, folic acid normal  -Syphilis Ab non reactive

## 2024-05-04 NOTE — ASSESSMENT & PLAN NOTE
Chronic condition, currently on levothyroxine 50 mcg daily  -Continue home levothyroxine dose  -Free T3 normal

## 2024-05-04 NOTE — ASSESSMENT & PLAN NOTE
Noted elevated BUN/Cr from baseline. Likely pre-renal secondary to dehydration related to poor oral intake   UA unremarkable  Improving  -Continue -Holding home lisinopril, lasix  -IVF maintenance 1/2 NS at rate of 83ml/h   -BMP daily

## 2024-05-04 NOTE — ASSESSMENT & PLAN NOTE
Chronic, improving from last admission. Apparently asymptomatic  Likely reactive  -Outpatient f/u for evaluation  RESOLVED

## 2024-05-04 NOTE — ED TRIAGE NOTES
83 yr old female arrived via ems from [place] at [facility] for fall when trying to get out of shower.  Pt denies LOC or hitting head.  Pt currently taking xarelto.      Pt with c/o thoracic to tail bone pain.

## 2024-05-04 NOTE — SENIOR ADMIT NOTE
[facility] Senior Admission Note      HPI  Donte Magaña is a 83 y.o. female with a past medical history that includes diastolic dysfunction, moderate aortic stenosis, urinary incontinence, MDD, HTN, CKD, PVD, afib on OAC with dual chamber pacer, hypothyroidism, sacral pressure ulcer who presented on 5/4/24 for mechanical ground level fall. She lives at [facility] and was in the shower, and without any sort of prodrome she lost her balance and fell, attempting to grab the grab bar but it ripped off the wall. She hit her head. She has not been having recent falls, however she has poor mobility- she rarely gets up and when she does she ambulates with a walker. Her facility has been encouraging her to walk more. She reports also she has a poor memory. In any case, EMS was called and she was found to have severe left hip and back pain.     ED course: vitals stable, labs with chronic leukocytosis, elevated MCV and RDW, chronically elevated BUN, relative TANIA, prolonged INR, clean UA. Imaging showed no fracture on CT C-S spine, head CT negative for acute process. Due to severe pain and inability to sit up in bed she is being admitted for intractable pain.       Physical Exam  Vitals reviewed.   Constitutional:       General: She is not in acute distress.     Appearance: Normal appearance. She is not ill-appearing, toxic-appearing or diaphoretic.   HENT:      Head: Normocephalic and atraumatic.   Eyes:      Extraocular Movements: Extraocular movements intact.   Cardiovascular:      Rate and Rhythm: Normal rate and regular rhythm.      Heart sounds: No murmur heard.  Pulmonary:      Effort: Pulmonary effort is normal.      Breath sounds: Normal breath sounds.   Abdominal:      General: Abdomen is flat. Bowel sounds are normal.      Palpations: Abdomen is soft.      Tenderness: There is no abdominal tenderness.   Musculoskeletal:         General: Tenderness (lower back) present.      Right lower leg: No edema.      Left  lower leg: No edema.      Comments: Old surgical scars on knees   Skin:     General: Skin is warm and dry.      Findings: Bruising (L hand) present.      Comments: Sacral ulcer   Neurological:      General: No focal deficit present.      Mental Status: She is alert and oriented to person, place, and time.      Sensory: No sensory deficit.      Motor: No weakness.   Psychiatric:         Mood and Affect: Mood normal.         Behavior: Behavior normal.         Assessment  82 YO F with PMHx afib on OAC, memory impairment, CKD admitted 5/4/24 for intractable lower back pain in the setting of mechanical ground level fall     Plan  #Intractable lower back pain  -Pain control   -PT/OT  -Fall precautions    #Impaired mobility  Patient reports poor mobility which is likely making her weaker and more prone to falls  -PT/OT eval for strengthening    #Sacral wound  Due [facility]  -wound care    #Afib  -continue BB and OAC    #Leukocytosis   Concerning for dysplastic disorder in setting of age, chronicity, immature granulocytes   -outpatient follow up     #Memory impairment  -check folate, replete B12, consider RPR       See Dr. John H&P for further details, plans, chronic problems and management      Discussed with my attending physician, Dr. Pardo

## 2024-05-04 NOTE — ASSESSMENT & PLAN NOTE
Patient sustained GLF when she tried to get up in the shower  CT head, CT pelvis, CT spines unremarkable for acute/findings or fractures  No neurological deficits  Improving  -Acetaminophen 1000mg p.o scheduled Q8h  -Dilaudid and oxycodone PRN for severe pain  -Fall precautions   -PT/OT, recommended HH  -PM&R consulted, pending

## 2024-05-04 NOTE — ASSESSMENT & PLAN NOTE
Admitted she lay on bed most of the day. Uses a walker for ambulation  -PT/OT, recommended HH  -PM&R consulted, pending   -Probably needs placement for post acute rehab

## 2024-05-04 NOTE — ED NOTES
Med rec completed per pt's MAR   Allergies reviewed     Pt takes Xarelto 20 mg daily   Last dose 5/3/2024 @1995

## 2024-05-04 NOTE — ED NOTES
"Received call from [place] [facility].  They are currently unable to get ahold of pt family.  \"Just goes to  and unable to leave a message\"  [facility] does not have pt transport available, when/if pt Dc'd.   "

## 2024-05-04 NOTE — ED PROVIDER NOTES
ED Provider Note    CHIEF COMPLAINT  Chief Complaint   Patient presents with    Back Pain     Pt arrived via EMS from [place] [facility].  Per report pt getting out of shower, took a tumble, grabbed towel bar,(towel bar came off wall) and pt fell.  Pt initially c/o left hip/side pain.  During transport pt c/o thoracic, lumbar pain       EXTERNAL RECORDS REVIEWED  [facility] for [facility].  The CTA was negative for PE.  She was started on Lasix.  She then sustained a kidney injury due to hypovolemia.    HPI/ROS  LIMITATION TO HISTORY   Select: : None  OUTSIDE HISTORIAN(S):  None    Donte Magaña is a 83 y.o. female who presents after ground-level fall.  The patient was in her baseline state of health and her nursing facility taking a shower when she reached for the towel bar, lost her balance, and fell on her backside.  She does not recall hitting her head but is not certain that she did not.  She is anticoagulated on Xarelto.  She was unable to get up off of the ground after the fall.  She was initially complaining of left-sided hip pain and during transport began to complain of back pain.  Here she has declined any pain medication.  She denies any current symptoms.    PAST MEDICAL HISTORY   has a past medical history of A-fib ([facility]), Anesthesia, Anticoagulant long-term use (1/12/2012), Arthritis, Atrial fibrillation ([facility]), Backpain, Bowel habit changes, Breath shortness, Bronchitis (Nov, 2013), CAD (coronary artery disease), Depression, Glaucoma (5/3/2011), Hematoma complicating a procedure (11/3/2012), Hemorrhagic disorder ([facility]), Hypertension, Hypothyroid, Lupus ([facility]), Macular degeneration, Menopause (1/12/2012), Mitral regurgitation (10/30/2012), Obesity (1/12/2012), Pacemaker (2018), Pneumonia (feb,2013), Pre-syncope (6/29/2018), Pulmonary hypertension ([facility]) (10/30/2012), PVC's (premature ventricular contractions) (1/12/2012),  Senile nuclear sclerosis, Spinal stenosis of lumbar region at multiple levels, Unspecified cataract, Unspecified urinary incontinence, and Urinary bladder disorder.    SURGICAL HISTORY   has a past surgical history that includes tonsillectomy and adenoidectomy; recovery (11/30/2010); colonoscopy (2008); abdominal hysterectomy total (April 15,1975); other; cataract phaco with iol (4/21/2015); cataract phaco with iol (Right, 5/5/2015); pacemaker insertion (06/30/2018); open reduction; other cardiac surgery (12/2017 and 07/19/2018 ); hip arth anterior total (Right, 1/17/2019); irrigation & debridement ortho (Right, 2/14/2019); combined ant/post colporrhaphy (1/14/2020); lap,diagnostic abdomen (1/14/2020); enterocele repair (1/14/2020); bladder sling female (1/14/2020); vaginal suspension (1/14/2020); salpingo oophorectomy (Bilateral, 1/14/2020); knee arthroplasty total (Left, 5/28/2015); and knee arthroplasty total (Right, 6/23/2016).    FAMILY HISTORY  Family History   Problem Relation Age of Onset    Stroke Mother     Diabetes Father     Stroke Sister     Heart Disease Brother     Stroke Sister     GI Disease Daughter         Crohn's Disease    Heart Disease Daughter         CHF    Other Daughter         Chronic Pain--Lymphedema    Cancer Paternal Aunt         breast       SOCIAL HISTORY  Social History     Tobacco Use    Smoking status: Never    Smokeless tobacco: Never   Vaping Use    Vaping Use: Never used   Substance and Sexual Activity    Alcohol use: No    Drug use: No    Sexual activity: Not Currently     Partners: Male     Birth control/protection: Post-Menopausal       CURRENT MEDICATIONS  Home Medications    **Home medications have not yet been reviewed for this encounter**         ALLERGIES  Allergies   Allergen Reactions    Amiodarone Hives     Throat and tongue itching    Bactrim Shortness of Breath    Cipro Xr Swelling    Metoprolol Swelling     Causes throat swelling    Morphine Unspecified      "Hallucinations    Phytoplex Z-Guard [Petrolatum-Zinc Oxide] Unspecified     \"causes burning\"    Pseudoephedrine Palpitations    Qvar [Beclomethasone Dipropionate] Unspecified     Pressure on heart      Vibramycin Shortness of Breath    Atorvastatin Calcium-Polysorbate 80 Unspecified     Muscle aches      Augmentin Unspecified     Unknown reaction    Diltiazem Rash     rash    Flecainide Unspecified     dizziness    Keflex Unspecified     Pt states \"Unsure\".    Mucinex Unspecified     GI Distress      Tramadol Unspecified     crying    Atorvastatin Myalgia    Tape Rash     Paper tape okay       PHYSICAL EXAM  VITAL SIGNS: /53   Pulse 83   Temp 36.1 °C (97 °F) (Temporal)   Resp 20   Ht 1.626 m (5' 4\")   Wt 76.7 kg (169 lb)   LMP 01/10/1975   SpO2 95%   BMI 29.01 kg/m²    Physical Exam  Vitals and nursing note reviewed.   Constitutional:       Appearance: Normal appearance.   HENT:      Head: Normocephalic and atraumatic.      Right Ear: External ear normal.      Left Ear: External ear normal.      Nose: Nose normal.      Mouth/Throat:      Mouth: Mucous membranes are dry.   Eyes:      Extraocular Movements: Extraocular movements intact.      Conjunctiva/sclera: Conjunctivae normal.      Pupils: Pupils are equal, round, and reactive to light.   Cardiovascular:      Rate and Rhythm: Normal rate and regular rhythm.   Pulmonary:      Effort: Pulmonary effort is normal.      Breath sounds: Normal breath sounds.   Abdominal:      Palpations: Abdomen is soft.      Tenderness: There is no abdominal tenderness.   Musculoskeletal:      Cervical back: Tenderness present.      Comments: Tenderness to the lateral aspect of the left hip.  Diffusely tender throughout the cervical, thoracic, and lumbar spine.   Skin:     General: Skin is warm and dry.   Neurological:      General: No focal deficit present.      Mental Status: She is alert and oriented to person, place, and time.   Psychiatric:         Mood and Affect: " Mood normal.         Behavior: Behavior normal.       EKG/LABS  Results for orders placed or performed during the hospital encounter of 05/04/24   CBC WITH DIFFERENTIAL   Result Value Ref Range    WBC 12.1 (H) 4.8 - 10.8 K/uL    RBC 4.50 4.20 - 5.40 M/uL    Hemoglobin 14.2 12.0 - 16.0 g/dL    Hematocrit 44.6 37.0 - 47.0 %    MCV 99.1 (H) 81.4 - 97.8 fL    MCH 31.6 27.0 - 33.0 pg    MCHC 31.8 (L) 32.2 - 35.5 g/dL    RDW 50.1 (H) 35.9 - 50.0 fL    Platelet Count 282 164 - 446 K/uL    MPV 9.9 9.0 - 12.9 fL    Neutrophils-Polys 66.40 44.00 - 72.00 %    Lymphocytes 20.10 (L) 22.00 - 41.00 %    Monocytes 5.90 0.00 - 13.40 %    Eosinophils 6.50 0.00 - 6.90 %    Basophils 0.50 0.00 - 1.80 %    Immature Granulocytes 0.60 0.00 - 0.90 %    Nucleated RBC 0.00 0.00 - 0.20 /100 WBC    Neutrophils (Absolute) 8.04 (H) 1.82 - 7.42 K/uL    Lymphs (Absolute) 2.44 1.00 - 4.80 K/uL    Monos (Absolute) 0.72 0.00 - 0.85 K/uL    Eos (Absolute) 0.79 (H) 0.00 - 0.51 K/uL    Baso (Absolute) 0.06 0.00 - 0.12 K/uL    Immature Granulocytes (abs) 0.07 0.00 - 0.11 K/uL    NRBC (Absolute) 0.00 K/uL   Basic Metabolic Panel   Result Value Ref Range    Sodium 138 135 - 145 mmol/L    Potassium 4.7 3.6 - 5.5 mmol/L    Chloride 99 96 - 112 mmol/L    Co2 23 20 - 33 mmol/L    Glucose 123 (H) 65 - 99 mg/dL    Bun 44 (H) 8 - 22 mg/dL    Creatinine 1.37 0.50 - 1.40 mg/dL    Calcium 9.8 8.5 - 10.5 mg/dL    Anion Gap 16.0 7.0 - 16.0   URINALYSIS,CULTURE IF INDICATED    Specimen: Urine, Clean Catch   Result Value Ref Range    Color Yellow     Character Clear     Specific Gravity 1.009 <1.035    Ph 7.5 5.0 - 8.0    Glucose Negative Negative mg/dL    Ketones Negative Negative mg/dL    Protein Negative Negative mg/dL    Bilirubin Negative Negative    Urobilinogen, Urine 0.2 Negative    Nitrite Negative Negative    Leukocyte Esterase Negative Negative    Occult Blood Negative Negative    Micro Urine Req see below    PT/INR   Result Value Ref Range    PT 18.6 (H)  12.0 - 14.6 sec    INR 1.53 (H) 0.87 - 1.13   PTT   Result Value Ref Range    APTT 40.5 (H) 24.7 - 36.0 sec   ESTIMATED GFR   Result Value Ref Range    GFR (CKD-EPI) 38 (A) >60 mL/min/1.73 m 2     *Note: Due to a large number of results and/or encounters for the requested time period, some results have not been displayed. A complete set of results can be found in Results Review.     I have independently interpreted this EKG    RADIOLOGY/PROCEDURES   I have independently interpreted the diagnostic imaging associated with this visit and am waiting the final reading from the radiologist.   My preliminary interpretation is as follows: No intracranial hemorrhage    Radiologist interpretation:  CT-PELVIS W/O PLUS RECONS   Final Result      No pelvic fracture.      CT-LSPINE W/O PLUS RECONS   Final Result      1.  Severe degenerative change of lumbar spine with levoconvex curvature.   2.  No acute fracture.   3.  Alignment is overall unchanged.      CT-TSPINE W/O PLUS RECONS   Final Result      1.  No thoracic spine fracture detected.   2.  Other findings, see above.      CT-CSPINE WITHOUT PLUS RECONS   Final Result      No cervical spine fracture detected.      CT-HEAD W/O   Final Result      No acute intracranial abnormality detected.           COURSE & MEDICAL DECISION MAKING    ASSESSMENT, COURSE AND PLAN  Care Narrative: This is an 83-year-old female who lives at a skilled nursing facility now here after she fell when she lost her balance while getting out of the shower.  She was brought here by EMS with pain in the left hip and back.  She arrives afebrile with normal vital signs, appears slightly dehydrated but nontoxic.  She has tenderness along the cervical, thoracic, and lumbar spine, as well as the lateral aspect of the left hip  But there is no deformity.  She has no bony step-offs.  The remainder of her exam is reassuring.    CT imaging was obtained to evaluate for traumatic injury and thankfully did not  demonstrate any acute abnormality.    Upon reevaluation, the patient is resting comfortably in bed but cannot sit up, get out of bed, or ambulate due to her discomfort.  She will require hospitalization for pain management and PT/OT.  I ordered her dose of pain medication as well as some baseline labs.  She was ultimately discussed with [facility] internal medicine team and admitted in guarded condition.      ADDITIONAL PROBLEMS MANAGED  None    DISPOSITION AND DISCUSSIONS  I have discussed management of the patient with the following physicians and DANNY's: Dr. Pardo, [facility] internal medicine attending.    Discussion of management with other [facility] or appropriate source(s): None     Escalation of care considered, and ultimately not performed: N/A.    Barriers to care at this time, including but not limited to:  None .     Decision tools and prescription drugs considered including, but not limited to:  N/A .    FINAL DIAGNOSIS  1.  Fall  2.  Left pelvis pain  3.  Acute low back pain     Electronically signed by: Zak Ferrara M.D., 5/4/2024 11:54 AM

## 2024-05-04 NOTE — H&P
[facility] History & Physical Note    Date of Service  5/4/2024    [facility] Team: [facility]   Attending: TARIQ Pardo M.d.  Senior Resident: Dr. Mckeon  Intern:  Dr. John  Contact Number: [phone]    Primary Care Physician  Jolie Escobar M.D.    Consultants  None      Code Status  DNAR/DNI    Chief Complaint  Chief Complaint   Patient presents with    Back Pain     Pt arrived via EMS from [place] at [facility].  Per report pt getting out of shower, took a tumble, grabbed towel bar,(towel bar came off wall) and pt fell.  Pt initially c/o left hip/side pain.  During transport pt c/o thoracic, lumbar pain       History of Presenting Illness (HPI):   Donte Magaña is a 83 y.o. female with PMHx includes CAD, HTN, Afib on long term anticoagulant, hypothyroidism, chronic bronchitis, chronic back pain, depression, impaired memory who presented 5/4/2024 with mechanical GLF and intractable low back pain.     Patient is a poor historian, but states she recalls trying to get up in the shower holding towel bar that broke off and she lost balance and fell down. She recalls she hit her bottom, but can't  remember if she hit her head. She couldn't get off the ground after fall, pain was severe initially on her left hip and to her lower back and Denies nausea, vomiting, LOC, lightheadedness, headaches, vision change or bladder/bowel  incontinence, weakness or numbness. Also denies CP, SOB,  orthopnea or palpitations.  Patient takes rivaroxaban for Afib, Atenolol and lisinopril. Admits that doesn't ambulate recently, has had bed sore on her lower back.      In the ED vitals are stable, on Room Air.  WBCs elevated (since last admission), elevated MCV,  BUN/Cr elevated, UA unremarkable.    CT pelvis unremarkable for pelvic fracture. CT head w/o unremarkable for acute findings. CT spine no acute finding/fractures. Only  chronic degenerative bone changes        I discussed the plan of care with  patient.    Review of Systems  Review of Systems   Constitutional:  Positive for malaise/fatigue. Negative for chills, fever and weight loss.   HENT:  Negative for ear pain, nosebleeds, sinus pain and sore throat.    Eyes:  Negative for blurred vision, double vision, photophobia and pain.   Respiratory:  Negative for cough, hemoptysis, sputum production, shortness of breath and wheezing.    Cardiovascular:  Positive for leg swelling. Negative for chest pain, palpitations and orthopnea.   Gastrointestinal:  Negative for abdominal pain, blood in stool, diarrhea, melena, nausea and vomiting.   Genitourinary:  Negative for dysuria, hematuria and urgency.   Musculoskeletal:  Positive for back pain and falls. Negative for neck pain.   Skin:  Negative for itching and rash.   Neurological:  Negative for dizziness, tingling, focal weakness, seizures, loss of consciousness, weakness and headaches.   Psychiatric/Behavioral:  Positive for memory loss. Negative for depression, hallucinations, substance abuse and suicidal ideas. The patient is not nervous/anxious.        Past Medical History   has a past medical history of A-fib ([facility]), Anesthesia, Anticoagulant long-term use (1/12/2012), Arthritis, Atrial fibrillation ([facility]), Backpain, Bowel habit changes, Breath shortness, Bronchitis (Nov, 2013), CAD (coronary artery disease), Depression, Glaucoma (5/3/2011), Hematoma complicating a procedure (11/3/2012), Hemorrhagic disorder ([facility]), Hypertension, Hypothyroid, Lupus ([facility]), Macular degeneration, Menopause (1/12/2012), Mitral regurgitation (10/30/2012), Obesity (1/12/2012), Pacemaker (2018), Pneumonia (feb,2013), Pre-syncope (6/29/2018), Pulmonary hypertension ([facility]) (10/30/2012), PVC's (premature ventricular contractions) (1/12/2012), Senile nuclear sclerosis, Spinal stenosis of lumbar region at multiple levels, Unspecified cataract, Unspecified urinary incontinence, and Urinary bladder disorder.    Surgical History   has a past  "surgical history that includes tonsillectomy and adenoidectomy; recovery (11/30/2010); colonoscopy (2008); abdominal hysterectomy total (April 15,1975); other; cataract phaco with iol (4/21/2015); cataract phaco with iol (Right, 5/5/2015); pacemaker insertion (06/30/2018); open reduction; other cardiac surgery (12/2017 and 07/19/2018 ); hip arth anterior total (Right, 1/17/2019); irrigation & debridement ortho (Right, 2/14/2019); pr combined ant/post colporrhaphy (1/14/2020); pr lap,diagnostic abdomen (1/14/2020); enterocele repair (1/14/2020); bladder sling female (1/14/2020); vaginal suspension (1/14/2020); salpingo oophorectomy (Bilateral, 1/14/2020); knee arthroplasty total (Left, 5/28/2015); and knee arthroplasty total (Right, 6/23/2016).     Family History  family history includes Cancer in her paternal aunt; Diabetes in her father; GI Disease in her daughter; Heart Disease in her brother and daughter; Other in her daughter; Stroke in her mother, sister, and sister.   Family history reviewed with patient.     Social History  Tobacco: Denied  Alcohol: Denied  Recreational drugs (illegal or prescription): Denied  Employment: Retired  Living Situation: half-way  Recent Travel: Denied  Primary Care Provider: Reviewed    Other (stressors, spirituality, exposures): [facility]     Allergies  Allergies   Allergen Reactions    Amiodarone Hives     Throat and tongue itching    Bactrim Shortness of Breath    Cipro Xr Swelling    Metoprolol Swelling     Causes throat swelling    Morphine Unspecified     Hallucinations    Phytoplex Z-Guard [Petrolatum-Zinc Oxide] Unspecified     \"causes burning\"    Pseudoephedrine Palpitations    Qvar [Beclomethasone Dipropionate] Unspecified     Pressure on heart      Vibramycin Shortness of Breath    Atorvastatin Calcium-Polysorbate 80 Unspecified     Muscle aches      Augmentin Unspecified     Unknown reaction    Diltiazem Rash     rash    Flecainide Unspecified     dizziness    Keflex " "Unspecified     Pt states \"Unsure\".    Mucinex Unspecified     GI Distress      Tramadol Unspecified     crying    Atorvastatin Myalgia    Tape Rash     Paper tape okay       Medications  Prior to Admission Medications   Prescriptions Last Dose Informant Patient Reported? Taking?   Acetaminophen 500 MG Cap PRN at PRN MAR from Other Facility Yes No   Sig: Take 1,000 mg by mouth 3 times a day as needed (Pain). Indications: Pain   Mirabegron ER (MYRBETRIQ) 50 MG TABLET SR 24 HR 5/4/2024 at 0800 MAR from Other Facility Yes Yes   Sig: Take 50 mg by mouth every day. Indications: Overactive Bladder   atenolol (TENORMIN) 50 MG Tab 5/3/2024 at 2000 MAR from Other Facility No Yes   Sig: Take 1 Tablet by mouth every evening. Indications: High Blood Pressure Disorder   fluticasone-umeclidinium-vilanterol (TRELEGY ELLIPTA) 100-62.5-25 mcg/act inhaler 5/4/2024 at 0800 MAR from Other Facility No Yes   Sig: Inhale 1 Puff every day.   furosemide (LASIX) 40 MG Tab 5/4/2024 at 0800 MAR from Other Facility Yes Yes   Sig: Take 40 mg by mouth 2 times a day. Indications: Edema   levothyroxine (SYNTHROID) 50 MCG Tab 5/4/2024 at 0600 MAR from Other Facility Yes Yes   Sig: Take 50 mcg by mouth every morning on an empty stomach.   lisinopril (PRINIVIL) 5 MG Tab 5/4/2024 at 0800 MAR from Other Facility Yes Yes   Sig: Take 5 mg by mouth every day. Indications: High Blood Pressure Disorder   magnesium oxide (MAG-OX) 400 MG Tab tablet 5/4/2024 at 0800 MAR from Other Facility Yes Yes   Sig: Take 400 mg by mouth every day.   rivaroxaban (XARELTO) 20 MG Tab tablet 5/3/2024 at 1700 MAR from Other Facility Yes Yes   Sig: Take 20 mg by mouth with dinner.   sertraline (ZOLOFT) 50 MG Tab 5/3/2024 at 2000 MAR from Other Facility No Yes   Sig: Take 1 Tablet by mouth every day. Indications: Major Depressive Disorder      Facility-Administered Medications: None       Physical Exam  Temp:  [36.1 °C (97 °F)] 36.1 °C (97 °F)  Pulse:  [80-90] 89  Resp:  [18-20] " 18  BP: (112-130)/(53-63) 130/60  SpO2:  [95 %-97 %] 95 %  Blood Pressure : 119/63   Temperature: 36.1 °C (97 °F)   Pulse: 80   Respiration: 18   Pulse Oximetry: 97 %       Physical Exam  Constitutional:       Appearance: Normal appearance.   HENT:      Head: Normocephalic and atraumatic.      Nose: Nose normal.      Mouth/Throat:      Mouth: Mucous membranes are moist.      Pharynx: Oropharynx is clear.   Eyes:      Extraocular Movements: Extraocular movements intact.      Conjunctiva/sclera: Conjunctivae normal.      Pupils: Pupils are equal, round, and reactive to light.   Cardiovascular:      Rate and Rhythm: Normal rate.      Pulses: Normal pulses.      Heart sounds: Normal heart sounds.   Pulmonary:      Effort: Pulmonary effort is normal.      Breath sounds: Normal breath sounds. No wheezing, rhonchi or rales.   Chest:      Chest wall: No tenderness.   Abdominal:      General: Bowel sounds are normal.      Palpations: Abdomen is soft.      Tenderness: There is no abdominal tenderness. There is no guarding or rebound.   Musculoskeletal:      Cervical back: Normal range of motion and neck supple.      Right lower leg: Edema present.      Left lower leg: Edema present.   Skin:     General: Skin is warm and dry.      Capillary Refill: Capillary refill takes less than 2 seconds.   Neurological:      General: No focal deficit present.      Mental Status: She is alert and oriented to person, place, and time.      Cranial Nerves: No cranial nerve deficit.      Sensory: No sensory deficit.      Motor: No weakness.   Psychiatric:         Mood and Affect: Mood normal.         Behavior: Behavior normal.         Laboratory:  Recent Labs     05/04/24  1408   WBC 12.1*   RBC 4.50   HEMOGLOBIN 14.2   HEMATOCRIT 44.6   MCV 99.1*   MCH 31.6   MCHC 31.8*   RDW 50.1*   PLATELETCT 282   MPV 9.9     Recent Labs     05/04/24  1408   SODIUM 138   POTASSIUM 4.7   CHLORIDE 99   CO2 23   GLUCOSE 123*   BUN 44*   CREATININE 1.37  "  CALCIUM 9.8     Recent Labs     05/04/24  1408   GLUCOSE 123*     Recent Labs     05/04/24  1408   APTT 40.5*   INR 1.53*     No results for input(s): \"NTPROBNP\" in the last 72 hours.      No results for input(s): \"TROPONINT\" in the last 72 hours.    Imaging:  CT-PELVIS W/O PLUS RECONS   Final Result      No pelvic fracture.      CT-LSPINE W/O PLUS RECONS   Final Result      1.  Severe degenerative change of lumbar spine with levoconvex curvature.   2.  No acute fracture.   3.  Alignment is overall unchanged.      CT-TSPINE W/O PLUS RECONS   Final Result      1.  No thoracic spine fracture detected.   2.  Other findings, see above.      CT-CSPINE WITHOUT PLUS RECONS   Final Result      No cervical spine fracture detected.      CT-HEAD W/O   Final Result      No acute intracranial abnormality detected.             X-Ray:  I have personally reviewed the images and compared with prior images.  EKG:  I have personally reviewed the images and compared with prior images.    Assessment/Plan:  Patient presented with GLF and intractable back pain. She needs IV pain control and PT/OT evaluation and treatment with possible postacute placement.    I anticipate this patient will require at least two midnights for appropriate medical management, necessitating inpatient admission because intractable pain and fall    Patient will need a Med/Surg bed on MEDICAL service .  The need is secondary to GLF and intractable back pain.    * Intractable back pain- (present on admission)  Assessment & Plan  Patient sustained GLF when she tried to get up in the shower  CT head, CT pelvis, CT spines unremarkable for acute/findings or fractures  Pain in the lower back couldn't able to get off ground after fall  No neurological deficits  -Admit to medical floor  -Acetaminophen 1000mg p.o scheduled Q8h  -Dilaudid and oxycodone PRN for severe pain  -PT/OT to evaluate and treat  -Fall precautions       Mechanical Ground-level fall  Assessment & " Plan  Likely mechanical given patient was getting up holding on towel bar that broke off  and lost balance  -Fall precautions  -Pain control  -PT/OT      TANIA (acute kidney injury) (HCC)  Assessment & Plan  Noted elevated BUN/Cr from baseline. Likely pre-renal related to poor oral intake   UA unremarkable  -Hold home lisinopril, lasix  -IVF maintenance at rate of 83ml/h x total 1L  -Bladder scan once   -Urine lytes, protein/cr ratio    Sacral wound  Assessment & Plan  Chronic, Stage 2 pressure ulcer  -Wound care consult    Incidental adrenal cortical adenoma  Assessment & Plan  Seen on CT  -Outpatient endocrine eval and work up    Memory impairment  Assessment & Plan  Chronic at baseline. TSH low with normal T4,    -B12, folic acid lab  -Syphilis Ab    Impaired mobility- (present on admission)  Assessment & Plan  Admitted she lay on bed most of the day. Uses a walker for ambulation  -PT/OT  -Probably needs placement for post acute rehab    Hypothyroidism- (present on admission)  Assessment & Plan  Chronic condition, currently on levothyroxine 50 mcg daily  -Continue home levothyroxine dose       Chronic atrial fibrillation (HCC)- (present on admission)  Assessment & Plan  Rate controlled  -Continue home atenolol with hold parameters  -Continue home xarelto    Leukocytosis- (present on admission)  Assessment & Plan  Chronic, improving from last admission. Apparently asymptomatic  Noted immature granuloctosis  -Outpatient f/u for evaluation        VTE prophylaxis: therapeutic anticoagulation with xarleto  Code status: DNAR/DNI  Diet: Regular  Dispo: Admitted to [facility] for pain control and PT/OT evaluation

## 2024-05-04 NOTE — ED NOTES
Pt able to assist with repositioning for telma care.  Janette replaced.     Received call from Siena Magaña [phone], discussed pt unable to amb.

## 2024-05-05 LAB
ALBUMIN SERPL BCP-MCNC: 3.9 G/DL (ref 3.2–4.9)
ALBUMIN/GLOB SERPL: 1.2 G/DL
ALP SERPL-CCNC: 81 U/L (ref 30–99)
ALT SERPL-CCNC: 5 U/L (ref 2–50)
ANION GAP SERPL CALC-SCNC: 15 MMOL/L (ref 7–16)
AST SERPL-CCNC: 18 U/L (ref 12–45)
BILIRUB SERPL-MCNC: 0.5 MG/DL (ref 0.1–1.5)
BUN SERPL-MCNC: 40 MG/DL (ref 8–22)
CALCIUM ALBUM COR SERPL-MCNC: 9.9 MG/DL (ref 8.5–10.5)
CALCIUM SERPL-MCNC: 9.8 MG/DL (ref 8.5–10.5)
CHLORIDE SERPL-SCNC: 100 MMOL/L (ref 96–112)
CO2 SERPL-SCNC: 25 MMOL/L (ref 20–33)
CREAT SERPL-MCNC: 1.29 MG/DL (ref 0.5–1.4)
ERYTHROCYTE [DISTWIDTH] IN BLOOD BY AUTOMATED COUNT: 50.5 FL (ref 35.9–50)
FOLATE SERPL-MCNC: 11 NG/ML
GFR SERPLBLD CREATININE-BSD FMLA CKD-EPI: 41 ML/MIN/1.73 M 2
GLOBULIN SER CALC-MCNC: 3.2 G/DL (ref 1.9–3.5)
GLUCOSE SERPL-MCNC: 114 MG/DL (ref 65–99)
HCT VFR BLD AUTO: 43.8 % (ref 37–47)
HGB BLD-MCNC: 14.1 G/DL (ref 12–16)
MCH RBC QN AUTO: 32.2 PG (ref 27–33)
MCHC RBC AUTO-ENTMCNC: 32.2 G/DL (ref 32.2–35.5)
MCV RBC AUTO: 100 FL (ref 81.4–97.8)
PLATELET # BLD AUTO: 269 K/UL (ref 164–446)
PMV BLD AUTO: 10.1 FL (ref 9–12.9)
POTASSIUM SERPL-SCNC: 4.8 MMOL/L (ref 3.6–5.5)
PROT SERPL-MCNC: 7.1 G/DL (ref 6–8.2)
RBC # BLD AUTO: 4.38 M/UL (ref 4.2–5.4)
SODIUM SERPL-SCNC: 140 MMOL/L (ref 135–145)
T3FREE SERPL-MCNC: 2.15 PG/ML (ref 2–4.4)
TSH SERPL DL<=0.005 MIU/L-ACNC: 0.37 UIU/ML (ref 0.38–5.33)
WBC # BLD AUTO: 9.8 K/UL (ref 4.8–10.8)

## 2024-05-05 PROCEDURE — 99232 SBSQ HOSP IP/OBS MODERATE 35: CPT | Mod: GC | Performed by: HOSPITALIST

## 2024-05-05 RX ORDER — SODIUM CHLORIDE 450 MG/100ML
INJECTION, SOLUTION INTRAVENOUS CONTINUOUS
Status: DISCONTINUED | OUTPATIENT
Start: 2024-05-05 | End: 2024-05-06

## 2024-05-05 RX ORDER — SODIUM CHLORIDE 9 MG/ML
500 INJECTION, SOLUTION INTRAVENOUS ONCE
Status: COMPLETED | OUTPATIENT
Start: 2024-05-05 | End: 2024-05-05

## 2024-05-05 RX ADMIN — CYANOCOBALAMIN TAB 500 MCG 500 MCG: 500 TAB at 06:14

## 2024-05-05 RX ADMIN — ACETAMINOPHEN 1000 MG: 500 TABLET, FILM COATED ORAL at 10:55

## 2024-05-05 RX ADMIN — SODIUM CHLORIDE: 4.5 INJECTION, SOLUTION INTRAVENOUS at 14:03

## 2024-05-05 RX ADMIN — ACETAMINOPHEN 1000 MG: 500 TABLET, FILM COATED ORAL at 16:16

## 2024-05-05 RX ADMIN — ACETAMINOPHEN 1000 MG: 500 TABLET, FILM COATED ORAL at 06:15

## 2024-05-05 RX ADMIN — RIVAROXABAN 15 MG: 15 TABLET, FILM COATED ORAL at 16:16

## 2024-05-05 RX ADMIN — FLUTICASONE FUROATE, UMECLIDINIUM BROMIDE AND VILANTEROL TRIFENATATE 1 PUFF: 100; 62.5; 25 POWDER RESPIRATORY (INHALATION) at 05:10

## 2024-05-05 RX ADMIN — Medication 400 MG: at 05:10

## 2024-05-05 RX ADMIN — LEVOTHYROXINE SODIUM 50 MCG: 0.05 TABLET ORAL at 05:10

## 2024-05-05 RX ADMIN — ATENOLOL 50 MG: 50 TABLET ORAL at 16:16

## 2024-05-05 RX ADMIN — SODIUM CHLORIDE 500 ML: 9 INJECTION, SOLUTION INTRAVENOUS at 12:26

## 2024-05-05 RX ADMIN — SERTRALINE HYDROCHLORIDE 50 MG: 50 TABLET ORAL at 16:16

## 2024-05-05 RX ADMIN — VIBEGRON 75 MG: 75 TABLET, FILM COATED ORAL at 05:10

## 2024-05-05 ASSESSMENT — ENCOUNTER SYMPTOMS
PHOTOPHOBIA: 0
SEIZURES: 0
PALPITATIONS: 0
BACK PAIN: 0
CHILLS: 0
DIAPHORESIS: 0
BLURRED VISION: 0
HALLUCINATIONS: 0
SHORTNESS OF BREATH: 0
SPUTUM PRODUCTION: 0
NERVOUS/ANXIOUS: 0
NAUSEA: 0
NECK PAIN: 0
BLOOD IN STOOL: 0
HEMOPTYSIS: 0
WHEEZING: 0
FOCAL WEAKNESS: 0
FEVER: 0
DIARRHEA: 0
MYALGIAS: 0
DIZZINESS: 0
MEMORY LOSS: 1
HEADACHES: 0
HEARTBURN: 0
DEPRESSION: 0
COUGH: 0
VOMITING: 0
ORTHOPNEA: 0
FLANK PAIN: 0
ABDOMINAL PAIN: 0

## 2024-05-05 ASSESSMENT — COGNITIVE AND FUNCTIONAL STATUS - GENERAL
STANDING UP FROM CHAIR USING ARMS: A LITTLE
MOVING TO AND FROM BED TO CHAIR: A LITTLE
WALKING IN HOSPITAL ROOM: A LITTLE
CLIMB 3 TO 5 STEPS WITH RAILING: A LOT
TURNING FROM BACK TO SIDE WHILE IN FLAT BAD: A LITTLE
MOBILITY SCORE: 17
MOVING FROM LYING ON BACK TO SITTING ON SIDE OF FLAT BED: A LITTLE
SUGGESTED CMS G CODE MODIFIER MOBILITY: CK

## 2024-05-05 ASSESSMENT — PAIN DESCRIPTION - PAIN TYPE
TYPE: ACUTE PAIN
TYPE: ACUTE PAIN

## 2024-05-05 ASSESSMENT — CHA2DS2 SCORE
AGE 65 TO 74: NO
SEX: FEMALE
DIABETES: NO
PRIOR STROKE OR TIA OR THROMBOEMBOLISM: NO
AGE 75 OR GREATER: YES
CHF OR LEFT VENTRICULAR DYSFUNCTION: NO
VASCULAR DISEASE: YES
HYPERTENSION: YES
CHA2DS2 VASC SCORE: 5

## 2024-05-05 ASSESSMENT — GAIT ASSESSMENTS
GAIT LEVEL OF ASSIST: STANDBY ASSIST
DISTANCE (FEET): 10
ASSISTIVE DEVICE: FRONT WHEEL WALKER
DEVIATION: BRADYKINETIC;DECREASED HEEL STRIKE;DECREASED TOE OFF

## 2024-05-05 ASSESSMENT — FIBROSIS 4 INDEX: FIB4 SCORE: 1.82

## 2024-05-05 NOTE — PROGRESS NOTES
[facility] Internal Medicine Daily Progress Note    Date of Service  5/5/2024    [facility] Team: [facility] Team   Attending: TARIQ Pardo M.d.  Senior Resident: Dr. Mckeon  Intern:  Dr. John  Contact Number: [phone]    Chief Complaint  Donte Magaña is a 83 y.o. female admitted 5/4/2024 with Intractable back pain following mechanical GLF.    Hospital Course  Donte Magaña is a 83 y.o. female with a past medical history that includes diastolic dysfunction, moderate aortic stenosis, urinary incontinence, MDD, HTN, CKD, PVD, afib on OAC with dual chamber pacer, hypothyroidism, sacral pressure ulcer who presented on 5/4/24 for mechanical ground level fall. She lives at [facility] and was in the shower, and without any sort of prodrome she lost her balance and fell, attempting to grab the grab bar but it ripped off the wall. She hit her head. She has not been having recent falls, however she has poor mobility- she rarely gets up and when she does she ambulates with a walker. Her facility has been encouraging her to walk more. She reports also she has a poor memory. In any case, EMS was called and she was found to have severe left hip and back pain.      ED course: vitals stable, labs with chronic leukocytosis, elevated MCV and RDW, chronically elevated BUN, relative TANIA, prolonged INR, clean UA. Imaging showed no fracture on CT C-S spine, head CT negative for acute process. Due to severe pain and inability to sit up in bed she is being admitted for intractable pain.     Interval Problem Update  No acute events overnight. Vitals are stable. Patient awake and alert. Tolerated tylenol only. Denies any complaint. Seen by PT, recommended HH due to impaired memory for familiarity.  -Started IVF 1/2 NS 83ml/h for TANIA, likely pre-renal  -PM&R consulted, pending  -Continue pain control regimen  -[facility].    I have discussed this patient's plan of care and discharge plan at IDT rounds today with Case Management,  Nursing, Nursing leadership, and other members of the IDT team.    Consultants/Specialty  None    Code Status  DNAR/DNI    Disposition  The patient is not medically cleared for discharge to home or a post-acute facility.      I have placed the appropriate orders for post-discharge needs.    Review of Systems  Review of Systems   Constitutional:  Positive for malaise/fatigue. Negative for chills, diaphoresis and fever.   HENT: Negative.     Eyes:  Negative for blurred vision and photophobia.   Respiratory:  Negative for cough, hemoptysis, sputum production, shortness of breath and wheezing.    Cardiovascular:  Negative for chest pain, palpitations, orthopnea and leg swelling.   Gastrointestinal:  Negative for abdominal pain, blood in stool, diarrhea, heartburn, melena, nausea and vomiting.   Genitourinary:  Negative for dysuria, flank pain, hematuria and urgency.   Musculoskeletal:  Negative for back pain, joint pain, myalgias and neck pain.   Neurological:  Negative for dizziness, focal weakness, seizures and headaches.   Psychiatric/Behavioral:  Positive for memory loss. Negative for depression and hallucinations. The patient is not nervous/anxious.         Physical Exam  Temp:  [36 °C (96.8 °F)-36.2 °C (97.2 °F)] 36.2 °C (97.2 °F)  Pulse:  [82-96] 82  Resp:  [18-19] 18  BP: (101-157)/(51-88) 101/51  SpO2:  [90 %-99 %] 90 %    Physical Exam  Constitutional:       Appearance: Normal appearance.   HENT:      Head: Normocephalic and atraumatic.      Nose: Nose normal.      Mouth/Throat:      Pharynx: Oropharynx is clear.   Eyes:      Extraocular Movements: Extraocular movements intact.      Conjunctiva/sclera: Conjunctivae normal.      Pupils: Pupils are equal, round, and reactive to light.   Cardiovascular:      Rate and Rhythm: Normal rate.      Pulses: Normal pulses.      Heart sounds: Normal heart sounds.   Pulmonary:      Effort: Pulmonary effort is normal.      Breath sounds: Normal breath sounds. No wheezing,  rhonchi or rales.   Abdominal:      General: Bowel sounds are normal.      Palpations: Abdomen is soft.      Tenderness: There is no abdominal tenderness. There is no guarding or rebound.   Musculoskeletal:         General: No swelling, tenderness or deformity.      Cervical back: Normal range of motion and neck supple.      Right lower leg: No edema.      Left lower leg: No edema.   Skin:     Capillary Refill: Capillary refill takes 2 to 3 seconds.      Coloration: Skin is not jaundiced.      Findings: Bruising present.   Neurological:      General: No focal deficit present.      Mental Status: She is alert. Mental status is at baseline.   Psychiatric:         Mood and Affect: Mood normal.         Behavior: Behavior normal.         Fluids    Intake/Output Summary (Last 24 hours) at 5/5/2024 1414  Last data filed at 5/5/2024 1200  Gross per 24 hour   Intake 1444.85 ml   Output 550 ml   Net 894.85 ml       Laboratory  Recent Labs     05/04/24  1408 05/05/24  0400   WBC 12.1* 9.8   RBC 4.50 4.38   HEMOGLOBIN 14.2 14.1   HEMATOCRIT 44.6 43.8   MCV 99.1* 100.0*   MCH 31.6 32.2   MCHC 31.8* 32.2   RDW 50.1* 50.5*   PLATELETCT 282 269   MPV 9.9 10.1     Recent Labs     05/04/24  1408 05/05/24  0400   SODIUM 138 140   POTASSIUM 4.7 4.8   CHLORIDE 99 100   CO2 23 25   GLUCOSE 123* 114*   BUN 44* 40*   CREATININE 1.37 1.29   CALCIUM 9.8 9.8     Recent Labs     05/04/24  1408   APTT 40.5*   INR 1.53*               Imaging  CT-PELVIS W/O PLUS RECONS   Final Result      No pelvic fracture.      CT-LSPINE W/O PLUS RECONS   Final Result      1.  Severe degenerative change of lumbar spine with levoconvex curvature.   2.  No acute fracture.   3.  Alignment is overall unchanged.      CT-TSPINE W/O PLUS RECONS   Final Result      1.  No thoracic spine fracture detected.   2.  Other findings, see above.      CT-CSPINE WITHOUT PLUS RECONS   Final Result      No cervical spine fracture detected.      CT-HEAD W/O   Final Result      No  acute intracranial abnormality detected.              Assessment/Plan  Problem Representation:    * Intractable back pain- (present on admission)  Assessment & Plan  Patient sustained GLF when she tried to get up in the shower  CT head, CT pelvis, CT spines unremarkable for acute/findings or fractures  No neurological deficits  Improving  -Acetaminophen 1000mg p.o scheduled Q8h  -Dilaudid and oxycodone PRN for severe pain  -Fall precautions   -PT/OT, recommended HH  -PM&R consulted, pending       Mechanical Ground-level fall  Assessment & Plan  Likely mechanical given patient was getting up holding on towel bar that broke off  and lost balance  -Fall precautions  -Pain control  -PT/OT, recommended HH  -PM&R consulted, pending      TANIA (acute kidney injury) (HCC)  Assessment & Plan  Noted elevated BUN/Cr from baseline. Likely pre-renal secondary to dehydration related to poor oral intake   UA unremarkable  Improving  -Continue -Holding home lisinopril, lasix  -IVF maintenance 1/2 NS at rate of 83ml/h   -BMP daily    Sacral wound  Assessment & Plan  Chronic, Stage 2 pressure ulcer  -Wound care consult    Incidental adrenal cortical adenoma  Assessment & Plan  Seen on CT  -Outpatient endocrine eval and work up    Memory impairment  Assessment & Plan  Chronic at baseline. TSH low with normal T4,  TS   -B12 lower normal, folic acid normal  -Syphilis Ab non reactive    Impaired mobility- (present on admission)  Assessment & Plan  Admitted she lay on bed most of the day. Uses a walker for ambulation  -PT/OT, recommended HH  -PM&R consulted, pending   -Probably needs placement for post acute rehab    Hypothyroidism- (present on admission)  Assessment & Plan  Chronic condition, currently on levothyroxine 50 mcg daily  -Continue home levothyroxine dose  -Free T3 normal       Chronic atrial fibrillation (HCC)- (present on admission)  Assessment & Plan  Rate controlled  -Continue home atenolol with hold parameters  -Continue  home xarelto    Leukocytosis- (present on admission)  Assessment & Plan  Chronic, improving from last admission. Apparently asymptomatic  Likely reactive  -Outpatient f/u for evaluation  RESOLVED         VTE prophylaxis: therapeutic anticoagulation with Xarleto    I have performed a physical exam and reviewed and updated ROS and Plan today (5/5/2024). In review of yesterday's note (5/4/2024), there are no changes except as documented above.

## 2024-05-05 NOTE — HOSPITAL COURSE
Donte Magaña is a 83 y.o. female with a past medical history that includes diastolic dysfunction, moderate aortic stenosis, urinary incontinence, MDD, HTN, CKD, PVD, afib on OAC with dual chamber pacer, hypothyroidism, sacral pressure ulcer who presented on 5/4/24 for mechanical ground level fall. She lives at [facility] and was in the shower, and without any sort of prodrome she lost her balance and fell, attempting to grab the grab bar but it ripped off the wall. She hit her head. She has not been having recent falls, however she has poor mobility- she rarely gets up and when she does she ambulates with a walker. Her facility has been encouraging her to walk more. She reports also she has a poor memory. In any case, EMS was called and she was found to have severe left hip and back pain.      ED course: vitals stable, labs with chronic leukocytosis, elevated MCV and RDW, chronically elevated BUN, relative TANIA, prolonged INR, clean UA. Imaging showed no fracture on CT C-S spine, head CT negative for acute process. Due to severe pain and inability to sit up in bed she is being admitted for intractable pain.

## 2024-05-05 NOTE — CARE PLAN
The patient is Stable - Low risk of patient condition declining or worsening    Shift Goals  Clinical Goals: pain control, PT/ OT  Patient Goals: rest, comfort  Family Goals: salbador    Progress made toward(s) clinical / shift goals:    Problem: Pain - Standard  Goal: Alleviation of pain or a reduction in pain to the patient’s comfort goal  Description: Target End Date:  Prior to discharge or change in level of care    Document on Vitals flowsheet    1.  Document pain using the appropriate pain scale per order or unit policy  2.  Educate and implement non-pharmacologic comfort measures (i.e. relaxation, distraction, massage, cold/heat therapy, etc.)  3.  Pain management medications as ordered  4.  Reassess pain after pain med administration per policy  5.  If opiods administered assess patient's response to pain medication is appropriate per POSS sedation scale  6.  Follow pain management plan developed in collaboration with patient and interdisciplinary team (including palliative care or pain specialists if applicable)  Outcome: Progressing     Problem: Knowledge Deficit - Standard  Goal: Patient and family/care givers will demonstrate understanding of plan of care, disease process/condition, diagnostic tests and medications  Description: Target End Date:  1-3 days or as soon as patient condition allows    Document in Patient Education    1.  Patient and family/caregiver oriented to unit, equipment, visitation policy and means for communicating concern  2.  Complete/review Learning Assessment  3.  Assess knowledge level of disease process/condition, treatment plan, diagnostic tests and medications  4.  Explain disease process/condition, treatment plan, diagnostic tests and medications  Outcome: Progressing     Problem: Skin Integrity  Goal: Skin integrity is maintained or improved  Description: Target End Date:  Prior to discharge or change in level of care    Document interventions on Skin Risk/Ashutosh flowsheet groups  and corresponding LDA    1.  Assess and monitor skin integrity, appearance and/or temperature  2.  Assess risk factors for impaired skin integrity and/or pressures ulcers  3.  Implement precautions to protect skin integrity in collaboration with interdisciplinary team  4.  Implement pressure ulcer prevention protocol if at risk for skin breakdown  5.  Confirm wound care consult if at risk for skin breakdown  6.  Ensure patient use of pressure relieving devices  (Low air loss bed, waffle overlay, heel protectors, ROHO cushion, etc)  Outcome: Progressing

## 2024-05-05 NOTE — DISCHARGE PLANNING
Received choice form @: 1538  Agency/Facility name: [facility]  Sent referral per choice form @: 1540    Received choice form @: 1539  Agency/Facility name: [facility]  Sent referral per choice form @: No referral sent.  Pending orders.

## 2024-05-05 NOTE — CARE PLAN
The patient is Stable - Low risk of patient condition declining or worsening    Shift Goals  Clinical Goals: pain control, safety  Patient Goals: sleep  Family Goals: get better    Progress made toward(s) clinical / shift goals:    Problem: Pain - Standard  Goal: Alleviation of pain or a reduction in pain to the patient’s comfort goal  Outcome: Progressing  Note:     Description  Target End Date:  Prior to discharge or change in level of care   Document on Vitals flowsheet     1.  Document pain using the appropriate pain scale per order or unit policy   2.  Educate and implement non-pharmacologic comfort measures (i.e. relaxation, distraction, massage, cold/heat therapy, etc.)   3.  Pain management medications as ordered   4.  Reassess pain after pain med administration per policy   5.  If opiods administered assess patient's response to pain medication is appropriate per POSS sedation scale   6.  Follow pain management plan developed in collaboration with patient and interdisciplinary team (including palliative care or pain specialists if applicable)    Pain will be at a tolerable level as evidence by, adequate sleep, pt states pain level is tolerable, and VS within parameters. Scheduled tylenol, PRN oxycodone and dilaudid on board for pain control.        Problem: Fall Risk  Goal: Patient will remain free from falls  Outcome: Progressing  Note:     Description  Target End Date:  Prior to discharge or change in level of care     Document interventions on the [place] Tien Fall Risk Assessment     1.  Assess for fall risk factors   2.  Implement fall precautions    - Patient currently on high fall risk. Patient unable to ambulate d/t lower back pain - PT/OT on board. Patient calls appropriately and understand the importance of calling staff when needed assistance. Bed alarm on, bed locked and on lowest position. Call light and personal belongings within reach.

## 2024-05-05 NOTE — PROGRESS NOTES
4 Eyes Skin Assessment Completed by DANNY Nguyen and DANNY marcial.    Head WDL  Ears WDL  Nose WDL  Mouth scratch on R cheek  Neck WDL  Breast/Chest WDL  Shoulder Blades WDL  Spine WDL  (R) Arm/Elbow/Hand WDL  (L) Arm/Elbow/Hand L hand bruise   Abdomen WDL  Groin excoriation   Scrotum/Coccyx/Buttocks sacral ulcer   (R) Leg scars   (L) Leg scar  (R) Heel/Foot/Toe Dry, red, warm, flaky, blanching   (L) Heel/Foot/Toe Dry, red, warm, flaky, blanching           Devices In Places Tele      Interventions In Place Heel mepilex, sacrum mepilex     Possible Skin Injury Yes    Pictures Uploaded Into Epic Yes  Wound Consult Placed Yes  RN Wound Prevention Protocol Ordered Yes

## 2024-05-05 NOTE — WOUND TEAM
[facility]  Inpatient Services  Initial Wound and Skin Care Evaluation    Admission Date: 5/4/2024     Last order of IP CONSULT TO WOUND CARE was found on 5/4/2024 from Hospital Encounter on 5/4/2024     HPI, PMH, SH: Reviewed    Past Surgical History:   Procedure Laterality Date    RI COMBINED ANT/POST COLPORRHAPHY  1/14/2020    Procedure: COLPORRHAPHY, COMBINED ANTEROPOSTERIOR - PERINEOPLASTY;  Surgeon: Waqas Robin M.D.;  Location: SURGERY SAME DAY [facility];  Service: Gynecology    RI LAP,DIAGNOSTIC ABDOMEN  1/14/2020    Procedure: PELVISCOPY;  Surgeon: Waqas Robin M.D.;  Location: SURGERY SAME DAY [facility];  Service: Gynecology    ENTEROCELE REPAIR  1/14/2020    Procedure: REPAIR, ENTEROCELE;  Surgeon: Waqas Robin M.D.;  Location: SURGERY SAME DAY [facility];  Service: Gynecology    BLADDER SLING FEMALE  1/14/2020    Procedure: BLADDER SLING, FEMALE - TOT;  Surgeon: Waqas Robin M.D.;  Location: SURGERY SAME DAY [facility];  Service: Gynecology    VAGINAL SUSPENSION  1/14/2020    Procedure: COLPOPEXY - SACROSPINOUS VAULT SUSPENSION;  Surgeon: Waqas Robin M.D.;  Location: SURGERY SAME DAY [facility];  Service: Gynecology    SALPINGO OOPHORECTOMY Bilateral 1/14/2020    Procedure: SALPINGO-OOPHORECTOMY;  Surgeon: Waqas Robin M.D.;  Location: SURGERY SAME DAY [facility];  Service: Gynecology    IRRIGATION & DEBRIDEMENT ORTHO Right 2/14/2019    Procedure: IRRIGATION & DEBRIDEMENT ORTHO-HIP WOUND ;  Surgeon: Vladimir Lee M.D.;  Location: [facility];  Service: Orthopedics    HIP ARTH ANTERIOR TOTAL Right 1/17/2019    Procedure: HIP ARTHROPLASTY ANTERIOR TOTAL;  Surgeon: Juan C Mercedes M.D.;  Location: SURGERY [facility];  Service: Orthopedics    PACEMAKER INSERTION  06/30/2018    Dual Chamber    KNEE ARTHROPLASTY TOTAL Right 6/23/2016    Procedure: KNEE ARTHROPLASTY TOTAL;  Surgeon: Heriberto Lozada M.D.;  Location: [facility]  ORS;  Service:     KNEE ARTHROPLASTY TOTAL Left 5/28/2015    Procedure: KNEE ARTHROPLASTY TOTAL;  Surgeon: Heriberto Lozada M.D.;  Location: SURGERY [facility];  Service:     CATARACT PHACO WITH IOL Right 5/5/2015    Procedure: IOL OD - STANDARD;  Surgeon: Dmitry Bejarano M.D.;  Location: SURGERY [facility];  Service:     CATARACT PHACO WITH IOL  4/21/2015    Performed by Dmitry Bejarano M.D. at SURGERY [facility] ORS    RECOVERY  11/30/2010    Performed by SURGERY, [facility]-RECOVERY at SURGERY SAME DAY [facility] ORS    COLONOSCOPY  2008    Normal    [facility]    ABDOMINAL HYSTERECTOMY TOTAL  April 15,1975    still has ovaries    OPEN REDUCTION      left ankle    OTHER      Removed pins from left ankle    OTHER CARDIAC SURGERY  12/2017 and 07/19/2018     Cardiac Ablation    TONSILLECTOMY AND ADENOIDECTOMY       Social History     Tobacco Use    Smoking status: Never    Smokeless tobacco: Never   Substance Use Topics    Alcohol use: No     Chief Complaint   Patient presents with    Back Pain     Pt arrived via EMS from [place] at [facility].  Per report pt getting out of shower, took a tumble, grabbed towel bar,(towel bar came off wall) and pt fell.  Pt initially c/o left hip/side pain.  During transport pt c/o thoracic, lumbar pain     Diagnosis: Intractable back pain [M54.9]    Unit where seen by Wound Team: S166/00     WOUND CONSULT RELATED TO:  Sacrum and heels    WOUND TEAM PLAN OF CARE - Frequency of Follow-up:   Nursing to follow dressing orders written for wound care. Contact wound team if area fails to progress, deteriorates or with any questions/concerns if something comes up before next scheduled follow up (See below as to whether wound is following and frequency of wound follow up)   Not following, consult as needed  - sacrum or heels    WOUND HISTORY:   Patient had a GLF while getting out of the shower yesterday, Patient lives at a SNF and is able to ambulate short distances with a FWW.   Patient stated that she attempted to catch her balance using the towel rack when it only broke off the wall.  Patient had imaging done and no broken bones, but patient reports soreness and discomfort.  Patient also discovered to be dehydrated.         WOUND ASSESSMENT/LDA  Wound 05/05/24 Pressure Injury Heel Lateral Right POA DTI (Active)   Date First Assessed/Time First Assessed: 05/05/24 1253   Present on Original Admission: Yes  Hand Hygiene Completed: Yes  Primary Wound Type: Pressure Injury  Location: Heel  Wound Orientation: Lateral  Laterality: Right  Wound Description (Comments):...      Assessments 5/5/2024 12:00 PM   Wound Image     Site Assessment Purple   Periwound Assessment Dry;Callused   Margins Defined edges;Attached edges   Closure Open to air   Drainage Amount None   Treatments Cleansed;Nonselective debridement;Site care;Offloading   Offloading/DME Heel float boot   Wound Cleansing Foam Cleanser/Washcloth   Periwound Protectant Skin Moisturizer   Dressing Status Open to Air   NEXT Weekly Photo (Inpatient Only) 05/12/24   Wound Team Following Not following   Pressure Injury Stage Deep Tissue       Wound 05/05/24 Pressure Injury Buttocks Bilateral POA resolving stage 2 (Active)   Date First Assessed/Time First Assessed: 05/05/24 1254   Present on Original Admission: Yes  Hand Hygiene Completed: Yes  Primary Wound Type: Pressure Injury  Location: Buttocks  Laterality: Bilateral  Wound Description (Comments): POA resolving stage 2      Assessments 5/5/2024 12:00 PM   Wound Image     Site Assessment Scabbed;Painful   Periwound Assessment Hyperpigmented;Scar tissue   Margins Defined edges;Attached edges   Closure Open to air   Drainage Amount None   Treatments Cleansed;Nonselective debridement;Site care;Offloading   Wound Cleansing Soap and Water   Periwound Protectant Barrier Paste   Dressing Status Open to Air   NEXT Weekly Photo (Inpatient Only) 05/12/24   Wound Team Following Not following    Pressure Injury Stage Stage 2        Vascular:    MARY BETH:   No results found.    Lab Values:    Lab Results   Component Value Date/Time    WBC 9.8 05/05/2024 04:00 AM    WBC 9.3 07/29/2010 12:00 AM    RBC 4.38 05/05/2024 04:00 AM    RBC 4.57 07/29/2010 12:00 AM    HEMOGLOBIN 14.1 05/05/2024 04:00 AM    HEMATOCRIT 43.8 05/05/2024 04:00 AM    CREACTPROT 5.81 (H) 04/10/2024 01:55 PM    SEDRATEWES 20 04/10/2024 11:00 AM    HBA1C 5.3 02/13/2019 07:57 PM         Culture Results show:  No results found for this or any previous visit (from the past 720 hour(s)).    Pain Level/Medicated:  None, Tolerated without pain medication       INTERVENTIONS BY WOUND TEAM:  Chart and images reviewed. Discussed with bedside RN. All areas of concern (based on picture review, LDA review and discussion with bedside RN) have been thoroughly assessed. Documentation of areas based on significant findings. This RN in to assess patient. Performed standard wound care which includes appropriate positioning, dressing removal and non-selective debridement. Pictures and measurements obtained weekly if/when required.    Wound:  bilateral heels  Cleansed/Non-selectively Debrided with:  Moist warm washcloth  Edie wound: Cleansed with Moist warm washcloth, Prepped with moisturizer massaged into skin   Primary Dressing:  offloaded with heel float boots.     Wound:  Sacrum  Cleansed/Non-selectively Debrided with:  Moist warm washcloth  Edie wound: Cleansed with Moist warm washcloth, Prepped with Barrier paste  Primary Dressing:  Offloaded    Advanced Wound Care Discharge Planning  Number of Clinicians necessary to complete wound care: 1  Is patient requiring IV pain medications for dressing changes:  No   Length of time for dressing change 20 min. (This does not include chart review, pre-medication time, set up, clean up or time spent charting.)    Interdisciplinary consultation: Patient, Bedside RN (Brian), N/A.  Pressure injury and staging reviewed with  N/A.    EVALUATION / RATIONALE FOR TREATMENT:     Date:  05/05/24  Wound Status:  Initial evaluation    Bilateral heels are red, but blanching and very dry with flaking.  Applied moisturizer and offloaded with heel float boots.  R. Lateral heel with purple DTI possibly from fall yesterday or POA.  Sacrum with intact scabs from POA stage 2 pressure injury.  Continue to moisture manage and offload.           Goals: Steady decrease in wound area and depth weekly.    NURSING PLAN OF CARE ORDERS:  Dressing changes: See Dressing Care orders  Skin care: See Skin Care orders  RN Prevention Protocol    NUTRITION RECOMMENDATIONS   Wound Team Recommendations:  Dietary consult    DIET ORDERS (From admission to next 24h)       Start     Ordered    05/04/24 1444  Diet Order Diet: Regular  ALL MEALS        Question:  Diet:  Answer:  Regular    05/04/24 1449                    PREVENTATIVE INTERVENTIONS:    Q shift Ashutosh - performed per nursing policy  Q shift pressure point assessments - performed per nursing policy    Surface/Positioning  Standard/trauma mattress - Currently in Place  TAPs Turning system - Ordered    Offloading/Redistribution  Heel float boots (Prevalon boot) - Applied this Visit      Containment/Moisture Prevention    Purwick/Condom Cath - Currently in Place    Anticipated discharge plans:  Skilled Nursing        Vac Discharge Needs:  Vac Discharge plan is purely a recommendation from wound team and not a requirement for discharge unless otherwise stated by physician.  Not Applicable Pt not on a wound vac

## 2024-05-05 NOTE — THERAPY
"Physical Therapy   Initial Evaluation     Patient Name: Donte Magaña  Age:  83 y.o., Sex:  female  Medical Record #: [id number]  Today's Date: 5/5/2024     Precautions  Precautions: Fall Risk    Assessment  Patient is 83 y.o. female presenting to acute stetting from [facility] following mechanical GLF with PMH  CAD, HTN, Afib on long term anticoagulant, hypothyroidism, chronic bronchitis, chronic back pain, depression, impaired memory.  CT head, pelvis and spine neg for acute injury, noted stage 2 sacral pressure ulcer.   Pt presents to physical therapy with ability to complete bed mobility with railing support, sit to stand, transfers and short distance ambulation with FWW and SBA. Unable to determine actual PLOF baseline due to baseline cognitive impairments (per pt report); however, pt notes \"feeling near herself.\" May follow with Outpatient PT, but again unclear from Pt.     Recommend pt be up to chair for all meals (waffle cushion in place), ambulate to restroom or use BSC as needed, and walk 2x daily in room with nursing staff. PT to follow to optimize functional independence and safety; however, appears capable of return home when medically cleared.     Plan    Physical Therapy Initial Treatment Plan   Treatment Plan : Bed Mobility, Equipment, Gait Training, Neuro Re-Education / Balance, Self Care / Home Evaluation, Therapeutic Activities, Therapeutic Exercise  Treatment Frequency: 3 Times per Week  Duration: Until Therapy Goals Met    DC Equipment Recommendations: None  Discharge Recommendations: Recommend home health for continued physical therapy services for familiarity and hopeful carryover due to baseline cognitive impairment per report.  Pt may be working with Outpatient PT - Reports Dtr takes her somewhere for some therapy.        Subjective    \"PT great, I'm not quite sure what happened but I feel good now. No pain except my buttock\"     Objective     05/05/24 0918   Precautions   Precautions Fall Risk "   Vitals   O2 Delivery Device None - Room Air   Pain 0 - 10 Group   Therapist Pain Assessment   (reports soreness at L buttock)   Prior Living Situation   Prior Services Home-Independent   Housing / Facility Independent Living Facility  (vs [facility])   Steps Into Home 0   Steps In Home 0   Equipment Owned Front-Wheel Walker;Scooter   Lives with - Patient's Self Care Capacity Attendant / Paid Care Giver   Comments unclear if pt resides at [facility] or [facility] level of care as she reports no assist with ADLs/mobility beyond Meals in the dining room   Prior Level of Functional Mobility   Bed Mobility Independent   Transfer Status Independent   Ambulation Independent   Ambulation Distance household   Assistive Devices Used Front-Wheel Walker   Comments pt reports using a scooter to access dining room for meals   Cognition    Level of Consciousness Alert   Comments acknowledges baseline memory impairment and is unable to state if her bed has railing support OOB or how she gets OOB.   Active ROM Lower Body    Active ROM Lower Body  WDL   Strength Lower Body   Lower Body Strength  X   Comments generalized weakness and deconditioing due to baseline limited activity tolerance   Balance Assessment   Sitting Balance (Static) Good   Sitting Balance (Dynamic) Good   Standing Balance (Static) Fair +   Standing Balance (Dynamic) Fair   Weight Shift Sitting Good   Weight Shift Standing Fair   Comments w/ FWW   Bed Mobility    Supine to Sit Standby Assist  (HOB elevated at 30*, reliant on railing support)   Sit to Supine   (seated in bedside chair at end of session)   Scooting Supervised   Gait Analysis   Gait Level Of Assist Standby Assist   Assistive Device Front Wheel Walker   Distance (Feet) 10   # of Times Distance was Traveled 2   Deviation Bradykinetic;Decreased Heel Strike;Decreased Toe Off   # of Stairs Climbed 0   Weight Bearing Status no restrictions   Comments slow but overall steady, declined further distance ambulation at this time.    Functional Mobility   Sit to Stand Standby Assist   Bed, Chair, Wheelchair Transfer Standby Assist   Toilet Transfers   (SBA with grab bar to sit on toilet. light Min A to stand from low toilet height with grab bar (more reassurance to pt))   Transfer Method Stand Step   6 Clicks Assessment - How much HELP from from another person do you currently need... (If the patient hasn't done an activity recently, how much help from another person do you think he/she would need if he/she tried?)   Turning from your back to your side while in a flat bed without using bedrails? 3   Moving from lying on your back to sitting on the side of a flat bed without using bedrails? 3   Moving to and from a bed to a chair (including a wheelchair)? 3   Standing up from a chair using your arms (e.g., wheelchair, or bedside chair)? 3   Walking in hospital room? 3   Climbing 3-5 steps with a railing? 2   6 clicks Mobility Score 17   Activity Tolerance   Sitting in Chair up to chair at end of session   Sitting Edge of Bed 3 min   Standing 5-6 min total   Patient / Family Goals    Patient / Family Goal #1 to return home   Short Term Goals    Short Term Goal # 1 pt will perform bed mobility without bed features and SPV in 6 visits   Short Term Goal # 2 pt will perform sit<> stand and functional transfers with SPV in 6 visit   Short Term Goal # 3 pt will ambulate 40 ft with FWW and SPV to access home environment in 6 visits   Education Group   Education Provided Role of Physical Therapist   Role of Physical Therapist Patient Response Patient;Acceptance;Explanation;Verbal Demonstration   Additional Comments discussed home health therapy and pt reports her Dtr takes her to therapy a couple times a week   Physical Therapy Initial Treatment Plan    Treatment Plan  Bed Mobility;Equipment;Gait Training;Neuro Re-Education / Balance;Self Care / Home Evaluation;Therapeutic Activities;Therapeutic Exercise   Treatment Frequency 3 Times per Week   Duration  Until Therapy Goals Met   Problem List    Problems Impaired Bed Mobility;Impaired Transfers;Impaired Ambulation;Functional Strength Deficit;Impaired Balance;Decreased Activity Tolerance   Anticipated Discharge Equipment and Recommendations   DC Equipment Recommendations None   Discharge Recommendations Recommend home health for continued physical therapy services for familiarity and hopeful carryover due to baseline cognitive impairment per report.  Pt may be working with Outpatient PT - Reports Dtr takes her somewhere for some therapy.

## 2024-05-05 NOTE — DISCHARGE PLANNING
HTH/SCP TCN chart review completed. Collaborated with ANTOINETTE Bautista prior to meeting with the pt. The most current review of medical record, knowledge of pt's PLOF and social support, LACE+ score of 76, 6 clicks scores of 12 ADL's and 12 mobility were considered.  Patient recently went to [facility] for rehab on 4/12/24.      Pt seen at bedside. Introduced TCN program. Provided education regarding post acute levels of care. Education provided regarding case management policy for blanket SNF referrals. Discussed HTH/SCP plan benefits. Pt verbalizes understanding.     Patient resides at [place] at [facility] and states she was independent with ADL's and mobility (used a scooter/SPC ambulation to dining room, 4WW mobility inside apartment.  Has a FWW- doesn't use.  [facility] now does her medication management.  She states she may need assistance with transportation at discharge.  She is currently on service with [facility] and would like to resume services but is agreeable to post-acute placement if recommended by therapy.      Choice proactively obtained for  IRF, HH (Resumption of [facility]), faxed to [facility] and given to CM.  TCN will continue to follow and collaborate with discharge planning team as additional post acute needs arise. Thank you.     Completed today:  PT/OT orders in chart.    Choice obtained: IRF, HH ([facility] of [facility]).  Notified of CM policy on blanket SNF referrals.    SCP with [facility].  Referred to  for Follow up appointment.      Addendum:  1518-  Per chart review, patients 6 clicks score for mobility is now a 17.   PT recommends Recommend home health on 5/5/24 for continued physical therapy services for familiarity and hopeful carryover due to baseline cognitive impairment per report.  Pt may be working with Outpatient PT - Reports Dtr takes her somewhere for some therapy.

## 2024-05-06 ENCOUNTER — HOME CARE VISIT (OUTPATIENT)
Dept: HOME HEALTH SERVICES | Facility: HOME HEALTHCARE | Age: 84
End: 2024-05-06
Payer: MEDICARE

## 2024-05-06 VITALS
OXYGEN SATURATION: 97 % | HEART RATE: 83 BPM | RESPIRATION RATE: 18 BRPM | BODY MASS INDEX: 28.98 KG/M2 | WEIGHT: 169.75 LBS | SYSTOLIC BLOOD PRESSURE: 109 MMHG | TEMPERATURE: 98.1 F | HEIGHT: 64 IN | DIASTOLIC BLOOD PRESSURE: 55 MMHG

## 2024-05-06 LAB
ANION GAP SERPL CALC-SCNC: 13 MMOL/L (ref 7–16)
BUN SERPL-MCNC: 35 MG/DL (ref 8–22)
CALCIUM SERPL-MCNC: 9.7 MG/DL (ref 8.5–10.5)
CHLORIDE SERPL-SCNC: 104 MMOL/L (ref 96–112)
CO2 SERPL-SCNC: 19 MMOL/L (ref 20–33)
CREAT SERPL-MCNC: 1.05 MG/DL (ref 0.5–1.4)
ERYTHROCYTE [DISTWIDTH] IN BLOOD BY AUTOMATED COUNT: 49.9 FL (ref 35.9–50)
GFR SERPLBLD CREATININE-BSD FMLA CKD-EPI: 52 ML/MIN/1.73 M 2
GLUCOSE SERPL-MCNC: 133 MG/DL (ref 65–99)
HCT VFR BLD AUTO: 42.6 % (ref 37–47)
HGB BLD-MCNC: 14 G/DL (ref 12–16)
MCH RBC QN AUTO: 32.3 PG (ref 27–33)
MCHC RBC AUTO-ENTMCNC: 32.9 G/DL (ref 32.2–35.5)
MCV RBC AUTO: 98.4 FL (ref 81.4–97.8)
PLATELET # BLD AUTO: 237 K/UL (ref 164–446)
PMV BLD AUTO: 10 FL (ref 9–12.9)
POTASSIUM SERPL-SCNC: 4.6 MMOL/L (ref 3.6–5.5)
RBC # BLD AUTO: 4.33 M/UL (ref 4.2–5.4)
SODIUM SERPL-SCNC: 136 MMOL/L (ref 135–145)
WBC # BLD AUTO: 8.6 K/UL (ref 4.8–10.8)

## 2024-05-06 PROCEDURE — 99239 HOSP IP/OBS DSCHRG MGMT >30: CPT | Mod: GC | Performed by: HOSPITALIST

## 2024-05-06 PROCEDURE — 99223 1ST HOSP IP/OBS HIGH 75: CPT | Performed by: PHYSICAL MEDICINE & REHABILITATION

## 2024-05-06 RX ORDER — ACETAMINOPHEN 500 MG
1000 TABLET ORAL EVERY 6 HOURS PRN
Qty: 15 TABLET | Refills: 0 | Status: SHIPPED | OUTPATIENT
Start: 2024-05-06 | End: 2024-05-16

## 2024-05-06 RX ORDER — SODIUM CHLORIDE, SODIUM LACTATE, POTASSIUM CHLORIDE, CALCIUM CHLORIDE 600; 310; 30; 20 MG/100ML; MG/100ML; MG/100ML; MG/100ML
INJECTION, SOLUTION INTRAVENOUS CONTINUOUS
Status: DISCONTINUED | OUTPATIENT
Start: 2024-05-06 | End: 2024-05-06 | Stop reason: HOSPADM

## 2024-05-06 RX ADMIN — LEVOTHYROXINE SODIUM 50 MCG: 0.05 TABLET ORAL at 06:10

## 2024-05-06 RX ADMIN — CYANOCOBALAMIN TAB 500 MCG 500 MCG: 500 TAB at 06:09

## 2024-05-06 RX ADMIN — SODIUM CHLORIDE, POTASSIUM CHLORIDE, SODIUM LACTATE AND CALCIUM CHLORIDE: 600; 310; 30; 20 INJECTION, SOLUTION INTRAVENOUS at 12:17

## 2024-05-06 RX ADMIN — ACETAMINOPHEN 1000 MG: 500 TABLET, FILM COATED ORAL at 06:09

## 2024-05-06 RX ADMIN — ACETAMINOPHEN 1000 MG: 500 TABLET, FILM COATED ORAL at 12:12

## 2024-05-06 RX ADMIN — FLUTICASONE FUROATE, UMECLIDINIUM BROMIDE AND VILANTEROL TRIFENATATE 1 PUFF: 100; 62.5; 25 POWDER RESPIRATORY (INHALATION) at 06:10

## 2024-05-06 RX ADMIN — Medication 400 MG: at 06:09

## 2024-05-06 RX ADMIN — SODIUM CHLORIDE: 4.5 INJECTION, SOLUTION INTRAVENOUS at 01:32

## 2024-05-06 RX ADMIN — VIBEGRON 75 MG: 75 TABLET, FILM COATED ORAL at 06:09

## 2024-05-06 ASSESSMENT — COGNITIVE AND FUNCTIONAL STATUS - GENERAL
PERSONAL GROOMING: A LITTLE
DAILY ACTIVITIY SCORE: 19
TOILETING: A LITTLE
DRESSING REGULAR LOWER BODY CLOTHING: A LITTLE
SUGGESTED CMS G CODE MODIFIER DAILY ACTIVITY: CK
HELP NEEDED FOR BATHING: A LITTLE
DRESSING REGULAR UPPER BODY CLOTHING: A LITTLE

## 2024-05-06 ASSESSMENT — PAIN DESCRIPTION - PAIN TYPE: TYPE: ACUTE PAIN

## 2024-05-06 ASSESSMENT — ACTIVITIES OF DAILY LIVING (ADL): TOILETING: INDEPENDENT

## 2024-05-06 ASSESSMENT — FIBROSIS 4 INDEX: FIB4 SCORE: 2.54

## 2024-05-06 NOTE — PROGRESS NOTES
Report received from offgoing RN    Pt A&Ox3-reoriented, resting in bed. Bed exit on. No complaints at this time.    Bed on lowest setting, call light in reach.

## 2024-05-06 NOTE — DISCHARGE PLANNING
DC Transport Scheduled    Transport Company Scheduled:  [facility]  Spoke with YAMILE at [facility] to schedule transport.      Scheduled Date: 5/6/2024  Scheduled Time: 1400    Destination: [facility], [address] [place] [address]        Notified care team of scheduled transport via Voalte.     If there are any changes needed to the DC transportation scheduled, please contact [facility] at ext. [phone] between the hours of 9126-4835 Mon-Fri. If outside those hours, contact the ED Case Manager at ext. [phone].

## 2024-05-06 NOTE — DISCHARGE PLANNING
[facility]    Referral from: Dr. Destiny Song    Insurance Provider on Facesheet: SCP    Potential Rehab Diagnosis: Debility    Chart review indicates patient may have on going medical management and may have therapy needs to possibly meet inpatient rehab facility criteria with the goal of returning to community.    D/C support will need to be verified: Daughter    Physiatry consultation pended per protocol.  OT eval pending.     Thank you for the referral.     1245-Please review the consult from Dr. Bright regarding post acute recommendations.  TCC will no longer follow.  Please reach out to myself with any questions.

## 2024-05-06 NOTE — DISCHARGE PLANNING
note:  Discussed during IDT rounds and per MD, PMR recommends SNF placement.     CM met with pt and she is agreeable with SNF.  CM called daughter Reina and she is agreeable with SNF placement. She has no preference as pt has never been in any SNF before. Reina was concerned because apparently pt was very depressed when she was at [facility] that's why they prefer her to go to [facility]. CM explained that referrals sent to various facilities in [place] and CM will call back once there is acceptance.

## 2024-05-06 NOTE — Clinical Note
Hold all Home Health services. Patient admitted to [facility] after GLF with intractable back pain

## 2024-05-06 NOTE — CONSULTS
"    Physical Medicine and Rehabilitation Consultation          Date of initial consultation: 5/6/2024  Consulting provider: Rober John MD  Reason for consultation: assess for acute inpatient rehab appropriateness  LOS: 2 Day(s)    Chief complaint: Ground-level fall    HPI: The patient is a 83 y.o. Right hand dominant female with a past medical history of atrial fibrillation on Xarelto, CAD, hypertension, lupus, spinal stenosis, MDD;  who presented on 5/4/2024 11:32 AM with ground-level fall in the shower at her residential.  Apparently the grab bar came off the wall.  Positive head strike, negative loss of consciousness.  Workup in the ED was negative for fracture.  Patient found to have a sacral wound, dehydration and TANIA.  Creatinine has normalized.    The patient currently reports overall feeling well.  She endorses poor memory.  Patient reports she does \"as little as possible\" during the day at her independent living facility.  We discussed SNF versus IPR and the requirements for both.  Her initial thought is that she would be fine with SNF, but could try IPR if it would be better for her.    ROS  Pertinent positives are mentioned in the HPI, all others reviewed and are negative.    [facility] Hx[facility]    THERAPY:  Restrictions: Fall risk  PT: Functional mobility   5/5: Walking 10 feet x 2 with front wheel walker standby assist    OT: ADLs  Pending    SLP:   Pending    IMAGING:  CT head, C-spine, T-spine, L-spine, pelvis  All negative for acute fracture or abnormality    PROCEDURES:  None    PMH:  Past Medical History:   Diagnosis Date    A-fib (HCC)     Anesthesia     \"Tachycardia for 5 days after cataract surgery\"    Anticoagulant long-term use 1/12/2012    Arthritis     osteo-Knees, hips    Atrial fibrillation (HCC)     Backpain     R hip    Bowel habit changes     diarrhea    Breath shortness     with exertion, prn O2 2L    Bronchitis Nov, 2013    CAD (coronary artery disease)  "    Depression     Glaucoma 5/3/2011    Hematoma complicating a procedure 11/3/2012    Hemorrhagic disorder (HCC)     bruising/coumadin    Hypertension     Hypothyroid     Lupus (HCC)     Macular degeneration     Menopause 1/12/2012    Mitral regurgitation 10/30/2012    Obesity 1/12/2012    Pacemaker 2018    Pneumonia feb,2013    Pre-syncope 6/29/2018    Pulmonary hypertension (HCC) 10/30/2012    PVC's (premature ventricular contractions) 1/12/2012    Senile nuclear sclerosis     Spinal stenosis of lumbar region at multiple levels     Unspecified cataract     repaired bilateral    Unspecified urinary incontinence     Urinary bladder disorder        PSH:  Past Surgical History:   Procedure Laterality Date    IN COMBINED ANT/POST COLPORRHAPHY  1/14/2020    Procedure: COLPORRHAPHY, COMBINED ANTEROPOSTERIOR - PERINEOPLASTY;  Surgeon: Waqas Robin M.D.;  Location: SURGERY SAME DAY [facility];  Service: Gynecology    IN LAP,DIAGNOSTIC ABDOMEN  1/14/2020    Procedure: PELVISCOPY;  Surgeon: Waqas Robin M.D.;  Location: SURGERY SAME DAY [facility];  Service: Gynecology    ENTEROCELE REPAIR  1/14/2020    Procedure: REPAIR, ENTEROCELE;  Surgeon: Waqas Robin M.D.;  Location: SURGERY SAME DAY [facility];  Service: Gynecology    BLADDER SLING FEMALE  1/14/2020    Procedure: BLADDER SLING, FEMALE - TOT;  Surgeon: Waqas Robin M.D.;  Location: SURGERY SAME DAY [facility];  Service: Gynecology    VAGINAL SUSPENSION  1/14/2020    Procedure: COLPOPEXY - SACROSPINOUS VAULT SUSPENSION;  Surgeon: Waqas Robin M.D.;  Location: SURGERY SAME DAY [facility];  Service: Gynecology    SALPINGO OOPHORECTOMY Bilateral 1/14/2020    Procedure: SALPINGO-OOPHORECTOMY;  Surgeon: Waqas Robin M.D.;  Location: SURGERY SAME DAY [facility];  Service: Gynecology    IRRIGATION & DEBRIDEMENT ORTHO Right 2/14/2019    Procedure: IRRIGATION & DEBRIDEMENT ORTHO-HIP WOUND ;  Surgeon: Vladimir Lee M.D.;  Location: SURGERY  [facility];  Service: Orthopedics    HIP ARTH ANTERIOR TOTAL Right 1/17/2019    Procedure: HIP ARTHROPLASTY ANTERIOR TOTAL;  Surgeon: Juan C Mercedes M.D.;  Location: SURGERY [facility];  Service: Orthopedics    PACEMAKER INSERTION  06/30/2018    Dual Chamber    KNEE ARTHROPLASTY TOTAL Right 6/23/2016    Procedure: KNEE ARTHROPLASTY TOTAL;  Surgeon: Heriberto Lozada M.D.;  Location: SURGERY [facility];  Service:     KNEE ARTHROPLASTY TOTAL Left 5/28/2015    Procedure: KNEE ARTHROPLASTY TOTAL;  Surgeon: Heriberto Lozada M.D.;  Location: SURGERY [facility];  Service:     CATARACT PHACO WITH IOL Right 5/5/2015    Procedure: IOL OD - STANDARD;  Surgeon: Dmitry Bejarano M.D.;  Location: [facility];  Service:     CATARACT PHACO WITH IOL  4/21/2015    Performed by Dmitry Bejarano M.D. at [facility]    RECOVERY  11/30/2010    Performed by SURGERY, CATH-RECOVERY at SURGERY SAME DAY [facility]    COLONOSCOPY  2008    Normal    [facility]    ABDOMINAL HYSTERECTOMY TOTAL  April 15,1975    still has ovaries    OPEN REDUCTION      left ankle    OTHER      Removed pins from left ankle    OTHER CARDIAC SURGERY  12/2017 and 07/19/2018     Cardiac Ablation    TONSILLECTOMY AND ADENOIDECTOMY         FHX:  Family History   Problem Relation Age of Onset    Stroke Mother     Diabetes Father     Stroke Sister     Heart Disease Brother     Stroke Sister     GI Disease Daughter         Crohn's Disease    Heart Disease Daughter         CHF    Other Daughter         Chronic Pain--Lymphedema    Cancer Paternal Aunt         breast       Medications:  Current Facility-Administered Medications   Medication Dose    1/2 NS infusion      Pharmacy Consult Request ...Pain Management Review 1 Each  1 Each    oxyCODONE immediate-release (Roxicodone) tablet 2.5 mg  2.5 mg    Or    oxyCODONE immediate-release (Roxicodone) tablet 5 mg  5 mg    Or    HYDROmorphone (Dilaudid) injection 0.25 mg  0.25 mg  "   senna-docusate (Pericolace Or Senokot S) 8.6-50 MG per tablet 2 Tablet  2 Tablet    And    polyethylene glycol/lytes (Miralax) Packet 1 Packet  1 Packet    sertraline (Zoloft) tablet 50 mg  50 mg    rivaroxaban (Xarelto) tablet 15 mg  15 mg    levothyroxine (Synthroid) tablet 50 mcg  50 mcg    atenolol (Tenormin) tablet 50 mg  50 mg    fluticasone-umeclidinium-vilanterol (Trelegy Ellipta) 100-62.5-25 mcg/act inhaler 1 Puff  1 Puff    [Held by provider] lisinopril (Prinivil) tablet 5 mg  5 mg    magnesium oxide tablet 400 mg  400 mg    acetaminophen (Tylenol) tablet 1,000 mg  1,000 mg    vibegron (Gemtesa) tablet 75 mg  75 mg    cyanocobalamin (Vitamin B-12) tablet 500 mcg  500 mcg       Allergies:  Allergies   Allergen Reactions    Amiodarone Hives     Throat and tongue itching    Bactrim Shortness of Breath    Cipro Xr Swelling    Metoprolol Swelling     Causes throat swelling    Morphine Unspecified     Hallucinations    Phytoplex Z-Guard [Petrolatum-Zinc Oxide] Unspecified     \"causes burning\"    Pseudoephedrine Palpitations    Qvar [Beclomethasone Dipropionate] Unspecified     Pressure on heart      Vibramycin Shortness of Breath    Atorvastatin Calcium-Polysorbate 80 Unspecified     Muscle aches      Augmentin Unspecified     Unknown reaction    Diltiazem Rash     rash    Flecainide Unspecified     dizziness    Keflex Unspecified     Pt states \"Unsure\".    Mucinex Unspecified     GI Distress      Tramadol Unspecified     crying    Atorvastatin Myalgia    Tape Rash     Paper tape okay         Physical Exam:  Vitals: /55   Pulse 83   Temp 36.7 °C (98.1 °F) (Temporal)   Resp 18   Ht 1.626 m (5' 4\")   Wt 77 kg (169 lb 12.1 oz)   SpO2 97%   Gen: NAD  Head: NC/AT  Eyes/ Nose/ Mouth: PERRLA, moist mucous membranes  Cardio: RRR, good distal perfusion, warm extremities  Pulm: normal respiratory effort, no cyanosis   Abd: Soft NTND, negative borborygmi   Ext: No peripheral edema. No calf tenderness. No " clubbing.    Mental status:  A&Ox4 (person, place, date, situation) answers questions appropriately follows commands  Speech: fluent, no aphasia or dysarthria    Skin:      Motor:      Upper Extremity  Myotome R L   Shoulder flexion C5 5 5   Elbow flexion C5 5 5   Wrist extension C6 5 5   Elbow extension C7 5 5   Finger flexion C8 5 5   Finger abduction T1 5 5     Lower Extremity Myotome R L   Hip flexion L2 5 5   Knee extension L3 5 5   Ankle dorsiflexion L4 5 5   Toe extension L5 5 5   Ankle plantarflexion S1 5 5       Labs: Reviewed and significant for   Recent Labs     05/04/24  1408 05/05/24  0400 05/06/24  0554   RBC 4.50 4.38 4.33   HEMOGLOBIN 14.2 14.1 14.0   HEMATOCRIT 44.6 43.8 42.6   PLATELETCT 282 269 237   PROTHROMBTM 18.6*  --   --    APTT 40.5*  --   --    INR 1.53*  --   --      Recent Labs     05/04/24 1408 05/05/24  0400 05/06/24  0554   SODIUM 138 140 136   POTASSIUM 4.7 4.8 4.6   CHLORIDE 99 100 104   CO2 23 25 19*   GLUCOSE 123* 114* 133*   BUN 44* 40* 35*   CREATININE 1.37 1.29 1.05   CALCIUM 9.8 9.8 9.7     Recent Results (from the past 24 hour(s))   Basic Metabolic Panel    Collection Time: 05/06/24  5:54 AM   Result Value Ref Range    Sodium 136 135 - 145 mmol/L    Potassium 4.6 3.6 - 5.5 mmol/L    Chloride 104 96 - 112 mmol/L    Co2 19 (L) 20 - 33 mmol/L    Glucose 133 (H) 65 - 99 mg/dL    Bun 35 (H) 8 - 22 mg/dL    Creatinine 1.05 0.50 - 1.40 mg/dL    Calcium 9.7 8.5 - 10.5 mg/dL    Anion Gap 13.0 7.0 - 16.0   CBC WITHOUT DIFFERENTIAL    Collection Time: 05/06/24  5:54 AM   Result Value Ref Range    WBC 8.6 4.8 - 10.8 K/uL    RBC 4.33 4.20 - 5.40 M/uL    Hemoglobin 14.0 12.0 - 16.0 g/dL    Hematocrit 42.6 37.0 - 47.0 %    MCV 98.4 (H) 81.4 - 97.8 fL    MCH 32.3 27.0 - 33.0 pg    MCHC 32.9 32.2 - 35.5 g/dL    RDW 49.9 35.9 - 50.0 fL    Platelet Count 237 164 - 446 K/uL    MPV 10.0 9.0 - 12.9 fL   ESTIMATED GFR    Collection Time: 05/06/24  5:54 AM   Result Value Ref Range    GFR (CKD-EPI)  52 (A) >60 mL/min/1.73 m 2         ASSESSMENT:  Patient is a 83 y.o. female admitted with ground-level fall, no fractures     Rehabilitation: Impaired ADLs and mobility  Barriers to transfer include: Insurance authorization, TCCs to verify disposition, medical clearance and bed availability. All cases are subject to administrative review.       Disposition recommendations:  -Recommend SNF placement.  Patient not a candidate for IPR because she lacks medical complexity to remain hospitalized.  Patient has no fractures, her TANIA has resolved.  Patient will benefit from additional therapy prior to returning home, however this will have to occur at an SNF.  -PMR will sign off, please reconsult or reach out via Voalte if further evaluation or medical management is requested    Medical Complexity:    Ground-level fall  -Fell in shower  -No fractures  -Pain controlled with low-dose oxycodone and Tylenol  -Limited walking distance with PT  -Awaiting OT evaluation  -Plan for SNF placement    Dehydration  -Treated with IV fluids    TANIA  -Resolved    Atrial fibrillation  -Chronic condition treated with Xarelto and atenolol    Sacral wound  -Present on admission  -Stage II  -Wound care consult  -Turn patient every 2 hours    Hypertension  -Pressures controlled under 140 systolic  -Lisinopril 5 mg daily    DVT PPX: Xarelto      Thank you for allowing us to participate in the care of this patient.     Patient was seen for >80 minutes on unit/floor of which > 50% of time was spent on counseling and coordination of care regarding the above, including prognosis, risk reduction, benefits of treatment, and options for next stage of care.    Chidi Bright, DO   Physical Medicine and Rehabilitation     Please note that this dictation was created using voice recognition software. I have made every reasonable attempt to correct obvious errors, but there may be errors of grammar and possibly content that I did not discover before finalizing  the note.

## 2024-05-06 NOTE — CARE PLAN
The patient is Stable - Low risk of patient condition declining or worsening    Shift Goals  Clinical Goals: VSS  Patient Goals: sleep  Family Goals: salbador    Progress made toward(s) clinical / shift goals:      Problem: Skin Integrity  Goal: Skin integrity is maintained or improved  Description: Target End Date:  Prior to discharge or change in level of care    Document interventions on Skin Risk/Ashutosh flowsheet groups and corresponding LDA    1.  Assess and monitor skin integrity, appearance and/or temperature  2.  Assess risk factors for impaired skin integrity and/or pressures ulcers  3.  Implement precautions to protect skin integrity in collaboration with interdisciplinary team  4.  Implement pressure ulcer prevention protocol if at risk for skin breakdown  5.  Confirm wound care consult if at risk for skin breakdown  6.  Ensure patient use of pressure relieving devices  (Low air loss bed, waffle overlay, heel protectors, ROHO cushion, etc)  Outcome: Progressing     Problem: Fall Risk  Goal: Patient will remain free from falls  Description: Target End Date:  Prior to discharge or change in level of care    Document interventions on the Renetta Chauhan Fall Risk Assessment    1.  Assess for fall risk factors  2.  Implement fall precautions  Outcome: Progressing

## 2024-05-06 NOTE — DISCHARGE PLANNING
Case Management Discharge Planning    Admission Date: 5/4/2024  GMLOS: 4.4  ALOS: 2    6-Clicks ADL Score: 19  6-Clicks Mobility Score: 17  PT and/or OT Eval ordered: Yes  Post-acute Referrals Ordered: Yes  Post-acute Choice Obtained: Yes  Has referral(s) been sent to post-acute provider:  Yes      Anticipated Discharge Dispo: Discharge Disposition: D/T to home under HHA care in anticipation of covered skilled care (06)  [facility] accepted pt    DME Needed: none    Action(s) Taken:  [id number] KVNG [facility] order placed. Talked to daughter Riena and she is agreeable with [facility]. Talked to pt and she is agreeable to go to SNF. Talked to Khari and she is agreeble to take pt today.     Addendum:  Talked to daughter at 1335 and she gave consent for pt to go to [facility]. She also talked to Khari at [facility].     Addendum:  @1440-delivered packet to RN .    Escalations Completed: n/a    Medically Clear: yes    Next Steps: none    Barriers to Discharge: none    Is the patient up for discharge tomorrow: today

## 2024-05-06 NOTE — PROGRESS NOTES
Patient requested Rn call her daughter Reina and update her daughter that the patient would be going to [facility]. Rn called Reina and updated her.

## 2024-05-06 NOTE — PROGRESS NOTES
Rn attempted to give report to admitting RN at [facility], gave Rn direct line to call back to get report. Pt not assigned in the system yet.

## 2024-05-06 NOTE — DISCHARGE PLANNING
ATTN: Case Management  RE: Referral for Home Health    Reason for referral denial: Patient transferring to SNF              Unfortunately, we are not able to accept this referral for the reason listed above. If further clarity is needed, our Transitional Care Specialists are available to discuss any barriers to service at [phone].      We look forward to collaborating with you in the future,  [facility] Team

## 2024-05-06 NOTE — THERAPY
Occupational Therapy   Initial Evaluation     Patient Name: Donte Magaña  Age:  83 y.o., Sex:  female  Medical Record #: [id number]  Today's Date: 5/6/2024     Precautions  Precautions: Fall Risk    Assessment    Patient is 83 y.o. female admitted after a GLF and now has intractable back pain. Other pertinent medical history includes TANIA, sacral wound, adrenal cortical hematoma, memory impairment, hypothyroidism, and a-fib. Pt seen for OT evaluation. Pt required SBA-CGA for functional mobility/ADL transfer, SBA to wash hands while standing, SBA for toilet hygiene, and max A to don/doff socks (doesn't wear socks at baseline). Pt reported she was staying in an ILF vs [facility] (would be beneficial to confirm history). Pt was I with ADLs prior to admission. Pt current functional performance limited impaired activity tolerance, impaired balance, generalized weakness, and impaired cognition. Pt will benefit from skilled OT while admitted to acute care.     Plan    Occupational Therapy Initial Treatment Plan   Treatment Interventions: Self Care / Activities of Daily Living, Adaptive Equipment, Neuro Re-Education / Balance, Therapeutic Exercises, Therapeutic Activity  Treatment Frequency: 3 Times per Week  Duration: Until Therapy Goals Met    DC Equipment Recommendations: Unable to determine at this time  Discharge Recommendations: Recommend home health for continued occupational therapy services      Objective     05/06/24 1013   Prior Living Situation   Prior Services Continuous (24 Hour) Care Giving Per Service   Housing / Facility Independent Living Facility   Steps Into Home 0   Steps In Home 0   Bathroom Set up Walk In Shower;Shower Chair;Grab Bars   Equipment Owned Front-Wheel Walker;Scooter;Tub / Shower Seat;Grab Bar(s) In Tub / Shower;Grab Bar(s) By Toilet   Lives with - Patient's Self Care Capacity Attendant / Paid Care Giver   Comments Pt reported she lives in the independent living side [facility].   Prior Level of  ADL Function   Self Feeding Independent   Grooming / Hygiene Independent   Bathing Independent   Dressing Independent   Toileting Independent   Prior Level of IADL Function   Medication Management Independent   Laundry Independent   Home Management Requires Assist   Shopping Dependent   Prior Level Of Mobility Independent With Device in Home  (uses scooter)   Driving / Transportation Relatives / Others Provide Transportation   Occupation (Pre-Hospital Vocational) Retired Due To Age   History of Falls   History of Falls Yes   Date of Last Fall   (shortly prior to admission, reported she grabbed the towel bar instead of grab bar while getting up and it broke)   Precautions   Precautions Fall Risk   Pain   Pain Scales 0 to 10 Scale    Pain 0 - 10 Group   Therapist Pain Assessment Post Activity Pain Same as Prior to Activity;Nurse Notified  (not rated, agreeable to session)   Cognition    Cognition / Consciousness WDL   Level of Consciousness Alert   Comments very pleasant and cooperative   Passive ROM Upper Body   Passive ROM Upper Body WDL   Active ROM Upper Body   Active ROM Upper Body  WDL   Strength Upper Body   Upper Body Strength  WDL   Sensation Upper Body   Upper Extremity Sensation  WDL   Upper Body Muscle Tone   Upper Body Muscle Tone  WDL   Coordination Upper Body   Coordination WDL   Balance Assessment   Sitting Balance (Static) Good   Sitting Balance (Dynamic) Good   Standing Balance (Static) Fair +   Standing Balance (Dynamic) Fair   Weight Shift Sitting Fair   Weight Shift Standing Fair   Comments w/ FWW   Bed Mobility    Supine to Sit Standby Assist   Sit to Supine   (up to chair post)   Scooting Supervised   Rolling Supervised   Comments HOB slightly elevated, no use of rails   ADL Assessment   Grooming Standby Assist;Standing  (washed hands at sink)   Lower Body Dressing Maximal Assist  (don/doff socks, pt doesn't normally wear socks)   Toileting Standby Assist  (urine on toilet)   Functional Mobility    Sit to Stand Standby Assist   Bed, Chair, Wheelchair Transfer Standby Assist   Toilet Transfers Contact Guard Assist  (reported toilet in hospital lower than home toilet)   Transfer Method Stand Step   Mobility EOB>bathroom>sink>chair   Comments w/ FWW   Visual Perception   Visual Perception  Not Tested   Activity Tolerance   Sitting in Chair up to chair post   Sitting Edge of Bed ~5 min   Standing >5 min   Comments functional for eval   Patient / Family Goals   Patient / Family Goal #1 to go home   Short Term Goals   Short Term Goal # 1 Pt will perform ADL transfer w/ supv   Short Term Goal # 2 Pt will perform LB dressing w/ supv   Short Term Goal # 3 Pt will perform standing g/h w/ supv   Education Group   Education Provided Role of Occupational Therapist;Activities of Daily Living   Role of Occupational Therapist Patient Response Patient;Acceptance;Explanation;Verbal Demonstration   ADL Patient Response Patient;Acceptance;Explanation;Demonstration;Verbal Demonstration;Action Demonstration

## 2024-05-06 NOTE — DISCHARGE SUMMARY
[facility] Discharge Summary    Attending: TARIQ Pardo M.d.  Senior Resident: Dr. Orourke  Intern:  Dr. Leal  Contact Number: [phone]    CHIEF COMPLAINT ON ADMISSION  Chief Complaint   Patient presents with    Back Pain     Pt arrived via EMS from [place] at [facility].  Per report pt getting out of shower, took a tumble, grabbed towel bar,(towel bar came off wall) and pt fell.  Pt initially c/o left hip/side pain.  During transport pt c/o thoracic, lumbar pain       Reason for Admission  EMS     Admission Date  5/4/2024    CODE STATUS  DNAR/DNI    HPI & HOSPITAL COURSE  Donte Magaña is a 83 y.o. female with a past medical history of diastolic heart failure, urinary incontinence, depression, hypertension, peripheral vascular disease, atrial fibrillation on fib on Xarelto - with pacemaker in place, hypothyroidism, and a sacral pressure ulcer who presented on 5/4/24 for mechanical ground level fall. She lives at [facility] and was in the shower, and without any sort of prodrome she lost her balance and fell, attempting to grab the grab bar but it ripped off the wall. She hit her head. EMS was called and she was found to have severe left hip and back pain.      In the ER, her vitals were stable, Imaging showed no lesions in CT head or CT cervical and thoracic spine, her lumbar spine CT showed severe degenerative changes. CT pelvis was negative for fractures. Despite absent lesions, her joint pain was severe so she was admitted for intractable pain. She also had mild leukocytosis and mild prerenal acute kidney injury in the setting of dehydration. She does have mild macrocytosis with borderline low B12. She has poor mobility at baseline - she rarely gets up and when she does she ambulates with a walker, PM&R was consulted, recommending SNF placement. Her TANIA is almost resolved with IV fluids and she was started on oral B12 supplementation.     Therefore, she is discharged in fair and  "stable condition to skilled nursing facility.    The patient met 2-midnight criteria for an inpatient stay at the time of discharge.    Discharge Date  5/6/24    Physical Exam on Day of Discharge  Physical Exam  Vitals and nursing note reviewed.   Constitutional:       General: She is not in acute distress.     Appearance: She is not ill-appearing.   HENT:      Head: Normocephalic and atraumatic.      Nose: Nose normal.      Mouth/Throat:      Mouth: Mucous membranes are moist.      Pharynx: Oropharynx is clear.   Eyes:      Extraocular Movements: Extraocular movements intact.      Conjunctiva/sclera: Conjunctivae normal.      Pupils: Pupils are equal, round, and reactive to light.   Cardiovascular:      Rate and Rhythm: Normal rate. Rhythm irregular.      Heart sounds: Normal heart sounds.   Pulmonary:      Effort: Pulmonary effort is normal.      Breath sounds: Normal breath sounds.   Abdominal:      General: Abdomen is flat.      Palpations: Abdomen is soft.   Musculoskeletal:         General: No swelling. Normal range of motion.      Cervical back: Normal range of motion.      Right lower leg: No edema.      Left lower leg: No edema.      Comments: Painful ROM on extension of lumbar spine and both hips, no pain or swelling to palpation.    Skin:     General: Skin is warm.      Capillary Refill: Capillary refill takes less than 2 seconds.   Neurological:      General: No focal deficit present.      Mental Status: She is alert. Mental status is at baseline.         FOLLOW UP ITEMS POST DISCHARGE  -Acute kidney injury - held Lasix and Lisinopril, restart within next few days as appropriate   -Generalized weakness  -B12 deficiency - macrocytosis     DISCHARGE DIAGNOSES  Principal Problem:    Intractable back pain (POA: Yes)  Active Problems:    Chronic atrial fibrillation (HCC) (POA: Yes)      Overview: SCP-DRAKE. \"Need to include - controlled with current medications or give       update on current work " "up/treatment\"            Protimes managed by [facility]mad[facility] Sees Dr Haywood at [facility]       Physicians      Cardioversion 11/30/2010, sotolol/coumadin (highly atopic -- sensitive),       intolerant of metoprolol, coreg, dilt, multaq    Hypothyroidism (POA: Yes)    Leukocytosis (POA: Yes)    Impaired mobility (POA: Yes)    TANIA (acute kidney injury) (HCC) (POA: Unknown)    Mechanical Ground-level fall (POA: Unknown)    Memory impairment (POA: Unknown)    Incidental adrenal cortical adenoma (POA: Unknown)    Sacral wound (POA: Unknown)  Resolved Problems:    * No resolved hospital problems. *      FOLLOW UP  Future Appointments   Date Time Provider Department Center   5/13/2024 To Be Determined Adalberto Mello, PT [facility] None   5/13/2024 To Be Determined Silke Roth, OT [facility] None   5/15/2024 To Be Determined Chela Mohamud R.N. [facility] None   5/15/2024 To Be Determined Adalberto Mello, PT [facility] None   5/20/2024 To Be Determined Adalberto Mello, PT [facility] None   5/22/2024 To Be Determined Chela Mohamud R.N. [facility] None   5/22/2024 To Be Determined Adalberto Mello, PT [facility] None   7/15/2024  4:20 PM Jolie Escobar M.D. [facility]     No follow-up provider specified.    MEDICATIONS ON DISCHARGE     Medication List        START taking these medications        Instructions   acetaminophen 500 MG Tabs  Commonly known as: Tylenol  Replaces: Acetaminophen 500 MG Caps   Take 2 Tablets by mouth every 6 hours as needed for Moderate Pain, Mild Pain or Fever for up to 10 days.  Dose: 1,000 mg     cyanocobalamin 500 MCG tablet  Start taking on: May 7, 2024  Commonly known as: Vitamin B12   Take 1 Tablet by mouth every day for 30 days.  Dose: 500 mcg            CONTINUE taking these medications        Instructions   atenolol 50 MG Tabs  Commonly known as: Tenormin   Take 1 Tablet by mouth every evening. Indications: High Blood Pressure Disorder  Dose: 50 mg     fluticasone-umeclidinium-vilanterol 100-62.5-25 mcg/act " "inhaler  Commonly known as: Trelegy Ellipta   Inhale 1 Puff every day.  Dose: 1 Puff     levothyroxine 50 MCG Tabs  Commonly known as: Synthroid   Take 50 mcg by mouth every morning on an empty stomach.  Dose: 50 mcg     magnesium oxide 400 MG Tabs tablet  Commonly known as: Mag-Ox   Take 400 mg by mouth every day.  Dose: 400 mg     Myrbetriq 50 MG Tb24  Generic drug: Mirabegron ER   Take 50 mg by mouth every day. Indications: Overactive Bladder  Dose: 50 mg     sertraline 50 MG Tabs  Commonly known as: Zoloft   Take 1 Tablet by mouth every day. Indications: Major Depressive Disorder  Dose: 50 mg     Xarelto 20 MG Tabs tablet  Generic drug: rivaroxaban   Take 20 mg by mouth with dinner.  Dose: 20 mg            STOP taking these medications      Acetaminophen 500 MG Caps  Replaced by: acetaminophen 500 MG Tabs     furosemide 40 MG Tabs  Commonly known as: Lasix            ASK your doctor about these medications        Instructions   lisinopril 5 MG Tabs  Commonly known as: Prinivil   Take 5 mg by mouth every day. Indications: High Blood Pressure Disorder  Dose: 5 mg              Allergies  Allergies   Allergen Reactions    Amiodarone Hives     Throat and tongue itching    Bactrim Shortness of Breath    Cipro Xr Swelling    Metoprolol Swelling     Causes throat swelling    Morphine Unspecified     Hallucinations    Phytoplex Z-Guard [Petrolatum-Zinc Oxide] Unspecified     \"causes burning\"    Pseudoephedrine Palpitations    Qvar [Beclomethasone Dipropionate] Unspecified     Pressure on heart      Vibramycin Shortness of Breath    Atorvastatin Calcium-Polysorbate 80 Unspecified     Muscle aches      Augmentin Unspecified     Unknown reaction    Diltiazem Rash     rash    Flecainide Unspecified     dizziness    Keflex Unspecified     Pt states \"Unsure\".    Mucinex Unspecified     GI Distress      Tramadol Unspecified     crying    Atorvastatin Myalgia    Tape Rash     Paper tape okay       DIET  Orders Placed This " Encounter   Procedures    Diet Order Diet: Regular     Standing Status:   Standing     Number of Occurrences:   1     Order Specific Question:   Diet:     Answer:   Regular [1]       ACTIVITY  As tolerated.  Weight bearing as tolerated    LINES, DRAINS, AND WOUNDS  This is an automated list. Peripheral IVs will be removed prior to discharge.  Peripheral IV 05/04/24 20 G Left Antecubital (Active)   Site Assessment Clean;Dry;Intact 05/06/24 0800   Dressing Type Transparent 05/06/24 0800   Line Status Infusing 05/06/24 0800   Dressing Status Old drainage 05/06/24 0800   Dressing Intervention N/A 05/06/24 0800   Dressing Change Due 05/11/24 05/05/24 2050   Date Primary Tubing Changed 05/05/24 05/05/24 0830   Date IV Connector(s) Changed 05/04/24 05/04/24 2035   Infiltration Grading ([facility]) 0 05/06/24 0800   Phlebitis Scale ([facility] Only) 0 05/06/24 0800     External Urinary Catheter (Female Wick) (Active)   Collection Container Suction container 05/05/24 2050   Output (mL) 200 mL 05/05/24 0531      Wound 04/07/24 Pressure Injury Buttocks POA DTI with surrounding MASD (Active)       Wound 05/05/24 Pressure Injury Heel Lateral Right POA DTI (Active)   Wound Image   05/05/24 1200   Site Assessment Purple 05/05/24 2050   Periwound Assessment Dry;Callused 05/05/24 2050   Margins Attached edges;Defined edges 05/05/24 2050   Closure Open to air 05/05/24 2050   Drainage Amount None 05/05/24 2050   Treatments Cleansed;Nonselective debridement;Site care;Offloading 05/05/24 1200   Offloading/DME Heel float boot 05/05/24 2050   Wound Cleansing Foam Cleanser/Washcloth 05/05/24 1200   Periwound Protectant Skin Moisturizer 05/05/24 1200   Dressing Status Open to Air 05/05/24 2050   NEXT Weekly Photo (Inpatient Only) 05/12/24 05/05/24 1200   Wound Team Following Not following 05/05/24 1200   WOUND NURSE ONLY - Pressure Injury Stage DTPI 05/05/24 1200       Wound 05/05/24 Pressure Injury Buttocks Bilateral POA resolving stage 2  (Active)   Wound Image   05/05/24 1200   Site Assessment Scabbed;Painful 05/05/24 2050   Periwound Assessment Hyperpigmented;Scar tissue 05/05/24 2050   Margins Attached edges;Defined edges 05/05/24 2050   Closure Open to air 05/05/24 2050   Drainage Amount None 05/05/24 2050   Treatments Cleansed;Nonselective debridement;Site care;Offloading 05/05/24 1200   Wound Cleansing Soap and Water 05/05/24 1200   Periwound Protectant Barrier Paste 05/05/24 2050   Dressing Status Open to Air 05/05/24 2050   NEXT Weekly Photo (Inpatient Only) 05/12/24 05/05/24 1200   Wound Team Following Not following 05/05/24 1200   WOUND NURSE ONLY - Pressure Injury Stage 2 05/05/24 1200       Peripheral IV 05/04/24 20 G Left Antecubital (Active)   Site Assessment Clean;Dry;Intact 05/06/24 0800   Dressing Type Transparent 05/06/24 0800   Line Status Infusing 05/06/24 0800   Dressing Status Old drainage 05/06/24 0800   Dressing Intervention N/A 05/06/24 0800   Dressing Change Due 05/11/24 05/05/24 2050   Date Primary Tubing Changed 05/05/24 05/05/24 0830   Date IV Connector(s) Changed 05/04/24 05/04/24 2035   Infiltration Grading ([facility]) 0 05/06/24 0800   Phlebitis Scale ([facility] Only) 0 05/06/24 0800               MENTAL STATUS ON TRANSFER  Alert and oriented, at baseline, understands plan of care.     CONSULTATIONS  Physical medicine and rehabilitation     PROCEDURES  None     LABORATORY  Lab Results   Component Value Date    SODIUM 136 05/06/2024    POTASSIUM 4.6 05/06/2024    CHLORIDE 104 05/06/2024    CO2 19 (L) 05/06/2024    GLUCOSE 133 (H) 05/06/2024    BUN 35 (H) 05/06/2024    CREATININE 1.05 05/06/2024    CREATININE 0.78 07/29/2010    GLOMRATE >59 07/29/2010        Lab Results   Component Value Date    WBC 8.6 05/06/2024    WBC 9.3 07/29/2010    HEMOGLOBIN 14.0 05/06/2024    HEMATOCRIT 42.6 05/06/2024    PLATELETCT 237 05/06/2024        Total time of the discharge process exceeds 42 minutes.

## 2024-05-06 NOTE — DISCHARGE PLANNING
Received HH choice form on 5/5/24 from [facility].  Sent HH order to [facility] due to order placed.     1229  Pt will be going to [facility], cancelled HH order by internal comment. HH order also cancelled by provider.

## 2024-05-06 NOTE — PROGRESS NOTES
Patient explained discharge education, patient verbalized understanding. Patient explaied the only new medication she would be going to [place] with would be tylenol for pain, patient verbalized understanding. Patients daughter called to update about patient status. Patient dressed and toileted prior to transport arriving. Patient belongings present on admission were present on discharge. Patients IV removed. Patient being transported to [place] via [facility] transport.

## 2024-05-06 NOTE — CASE COMMUNICATION
noted  ----- Message -----  From: Kelly Caba R.N.  Sent: 5/4/2024   9:05 PM PDT  To: Chela Mohamud R.N.; Sneha Medina R.N.; *      Pt ambulates using walker, alert and oriented x3, forgtetful, not using oxygen. states she uses it as needed . . Denies shortness of breath or pain. Medications are managed by staff , needs doug , staff will send communication with PCP. Teachings done CHF protocol, safety precautions, bleeding , fall and  pressure ulcer prevention. Pt forgetful, states she will try to remember. Reports no chest pains ,bleeding or falls.

## 2024-05-06 NOTE — DISCHARGE PLANNING
[facility] chart review completed. Collaborated with ANTOINETTE Antonio.  Patient resides at [place] at [facility] with plans for discharge to SNF.   CM got choice for [facility].  Plan for discharge to [facility] today at 2 PM via [facility] transport.  [facility] authorization obtained.  CM notified.   [facility] will continue to follow and collaborate with discharge planning team as additional post acute needs arise. Thank you.    Completed:  PT recommends Recommend home health on 5/5/24 for continued physical therapy services for familiarity and hopeful carryover due to baseline cognitive impairment per report.  Pt may be working with Outpatient PT - Reports Dtr takes her somewhere for some therapy.   OT Recommend home health on 5/6/24.  Choice obtained: IRF, HH (Resumption of [facility]).  Notified of  policy on blanket SNF referrals.    SCP with [facility] PCP.  Referred to  for Follow up appointment.

## 2024-05-06 NOTE — CARE PLAN
The patient is Stable - Low risk of patient condition declining or worsening    Shift Goals  Clinical Goals: VSS  Patient Goals: sleep  Family Goals: salbador    Progress made toward(s) clinical / shift goals:    Problem: Pain - Standard  Goal: Alleviation of pain or a reduction in pain to the patient’s comfort goal  Outcome: Progressing     Problem: Knowledge Deficit - Standard  Goal: Patient and family/care givers will demonstrate understanding of plan of care, disease process/condition, diagnostic tests and medications  Outcome: Progressing     Problem: Skin Integrity  Goal: Skin integrity is maintained or improved  Outcome: Progressing     Problem: Fall Risk  Goal: Patient will remain free from falls  Outcome: Progressing       Patient is not progressing towards the following goals:

## 2024-05-06 NOTE — DISCHARGE PLANNING
Pt is confused per note so CM called daughter Reina. Pt lives at [facility] in assisted living so she has 24/7 care including med management. Reina said that pt was active with [facility] and choice has been made . Message for MD for HH order. Reina is agreeable with HH as recommended by PT/OT.    PCP is Dr. Jolie Brito.     Care Transition Team Assessment    Information Source  Orientation Level: Oriented to person, Oriented to place, Disoriented to time, Disoriented to situation  Information Given By: Other (Comments)  Informant's Name: Daughter Reina  Who is responsible for making decisions for patient? : Adult child  Name(s) of Primary Decision Maker: Reina    Readmission Evaluation  Is this a readmission?: Yes - unplanned readmission    Elopement Risk  Legal Hold: No  Ambulatory or Self Mobile in Wheelchair: No-Not an Elopement Risk  Disoriented: No  Psychiatric Symptoms: None  History of Wandering: No  Elopement this Admit: No  Vocalizing Wanting to Leave: No  Displays Behaviors, Body Language Wanting to Leave: No-Not at Risk for Elopement  Elopement Risk: Not at Risk for Elopement    Interdisciplinary Discharge Planning  Does Admitting Nurse Feel This Could be a Complex Discharge?: No  Primary Care Physician: Jolie Brito MD  Lives with - Patient's Self Care Capacity: Attendant / Paid Care Giver  Patient or legal guardian wants to designate a caregiver: No  Support Systems: Family Member(s)  Housing / Facility: Assisted Living Residence  Name of Care Facility: [facility]  Do You Take your Prescribed Medications Regularly: Yes  Able to Return to Previous ADL's: Future Time w/Therapy  Mobility Issues: Yes  Prior Services: Continuous (24 Hour) Care Giving Per Service  Patient Prefers to be Discharged to:: [facility] with HH  Assistance Needed: Yes  Durable Medical Equipment: Walker, Other - Specify (scooter)  DME Provider / Phone: N/A    Discharge Preparedness  What is your plan after discharge?: Home  health care  What are your discharge supports?: Child  Prior Functional Level: Needs Assist with Activities of Daily Living, Needs Assist with Medication Management, Uses Walker  Difficulity with ADLs: Bathing, Toileting, Walking  Difficulity with IADLs: Cooking, Driving, Laundry, Shopping    Functional Assesment  Prior Functional Level: Needs Assist with Activities of Daily Living, Needs Assist with Medication Management, Uses Walker    Finances  Financial Barriers to Discharge: No  Prescription Coverage: Yes    Vision / Hearing Impairment  Vision Impairment : Yes  Right Eye Vision: Impaired, Wears Glasses  Left Eye Vision: Impaired, Wears Glasses  Hearing Impairment : No    Values / Beliefs / Concerns  Values / Beliefs Concerns : No    Advance Directive  Advance Directive?: POLST    Domestic Abuse  Have you ever been the victim of abuse or violence?: No  Physical Abuse or Sexual Abuse: No  Verbal Abuse or Emotional Abuse: No  Possible Abuse/Neglect Reported to:: Not Applicable    Psychological Assessment  History of Substance Abuse: None    Discharge Risks or Barriers  Discharge risks or barriers?: Post-acute placement / services  Patient risk factors: Cognitive / sensory / physical deficit, Vulnerable adult    Anticipated Discharge Information  Discharge Disposition: D/T to home under [facility] care in anticipation of covered skilled care (06)

## 2024-05-06 NOTE — DISCHARGE PLANNING
note:  Message sent to Jarvis at [facility] but waiting for OT.    Message sent to OT Tierra who saw pt and recommends HH services.  Message sent to MD Carolyne Leal for HH order. Pt is active with [facility] but needs a reorder.

## 2024-05-07 NOTE — CASE COMMUNICATION
noted  ----- Message -----  From: Sneha Medina R.N.  Sent: 5/6/2024   1:53 PM PDT  To: Chela Mohamud R.N.; *      Hold all Home Health services. Patient admitted to [facility] after [facility] with intractable back pain

## 2024-05-09 ENCOUNTER — HOME CARE VISIT (OUTPATIENT)
Dept: HOME HEALTH SERVICES | Facility: HOME HEALTHCARE | Age: 84
End: 2024-05-09
Payer: MEDICARE

## 2024-05-09 NOTE — CASE COMMUNICATION
I agree with the changes made.  ----- Message -----  From: Angelica Stockton R.N.  Sent: 5/9/2024   7:52 AM PDT  To: Yecenia Morocho R.N.      Quality Review Completed for 4/27 SOC OASIS by LILLIE Stockton, RN on 5/5/2024:     Edits completed by LILLIE Stockton, RN:     1.  and  diagnosis coding updated per chart review  2.  is 4/27 for LSOC ordered  3.  is yes to PVD  4. Pneumonia vaccine on record for being administered on 10/15/ 2009 , 11/5/2002. Changed response to yes  5.  changed to 3 per OT eval walking out of room,  changed to 5/2 for OT collaboration  6. Photo shows pressure ulcer covered in eschar. Changed  E1. Unstageable  7. Checked exercises prescribed to Activities Permitted on 485 form.  Checked dyspnea w/min exertion to   485 functional limitations. Checked ambulate only w/assist to 485 Safety Measures.   8. Per narrative supervision  level of assist changed , 1810, 1820, 1845, 1850 to 2.  to 5 per   PT eval patient declined gait eval but did transfer with one step from the bed to the scooter.  is 3.   9. [id number] changed to #4 on A, B, C, E, F, G, H  10. [id number] D, F changed to #4. I and P changed to #88 per PT eval non ambulatory/declined gait assessment

## 2024-05-09 NOTE — DOCUMENTATION QUERY
[facility]                                                                       Query Response Note      PATIENT:               STEPHANIE DAVENPORT  ACCT #:                  [id number]  MRN:                     [id number]  :                      1940  ADMIT DATE:       2024 11:32 AM  DISCH DATE:        2024 2:09 PM  RESPONDING  PROVIDER #:        [id number]           QUERY TEXT:    The wound care consult indicates this patient is being treated for ulcer of the following site:      Pressure Injury Heel Lateral Right,  DTI (Active)    Based on your medical judgement, can you please confirm this finding?      The patient's Clinical Indicators include:  - Findings:  Wound care consult 24:  Pressure Injury Heel Lateral Right POA DTI (Active)    - Treatments:  wound care consult, nonselective debridement, offloading, heel float boot     - Risk factors:  advanced age, dehydration, reduced mobility     Thank You,  Leigh Muniz RN  Clinical Documentation   [email or web address]  Connect via [email or web address]  Options provided:   -- Pressure Injury Heel Lateral Right,  DTI (Active) present on admission   -- Other explanation, (please specify other explanation)      Query created by: Leigh Muniz on 2024 5:56 PM    RESPONSE TEXT:    Pressure Injury Heel Lateral Right, DTI (Active) present on admission          Electronically signed by:  JANETTE PINEDA MD 2024 11:20 AM

## 2024-05-09 NOTE — CASE COMMUNICATION
Quality Review Completed for 4/27 SOC OASIS by LILLIE Stockton RN on 5/5/2024:     Edits completed by LILLIE Stockton RN:     1.  and  diagnosis coding updated per chart review  2.  is 4/27 for LSOC ordered  3.  is yes to PVD  4. Pneumonia vaccine on record for being administered on 10/15/2009 , 11/5/2002. Changed response to yes  5.  changed to 3 per OT eval walking out of room,  changed to 5/2 for OT collaboration   6. Photo shows pressure ulcer covered in eschar. Changed  E1. Unstageable  7. Checked exercises prescribed to Activities Permitted on 485 form.  Checked dyspnea w/min exertion to   485 functional limitations. Checked ambulate only w/assist to 485 Safety Measures.   8. Per narrative supervision level of assist changed , 1810, 1820, 1845, 1850 to 2.  to 5 per   PT eval patient declined gait eval but did transfer with one s tep from the bed to the scooter.  is 3.   9. [id number] changed to #4 on A, B, C, E, F, G, H  10. [id number] D, F changed to #4. I and P changed to #88 per PT eval non ambulatory/declined gait assessment

## 2024-05-13 ENCOUNTER — HOME CARE VISIT (OUTPATIENT)
Dept: HOME HEALTH SERVICES | Facility: HOME HEALTHCARE | Age: 84
End: 2024-05-13
Payer: MEDICARE

## 2024-05-13 NOTE — CASE COMMUNICATION
noted  ----- Message -----  From: Sneha Medina R.N.  Sent: 5/13/2024   4:21 PM PDT  To: Chela Mohamud R.N.; *      LATE ENTRY   HH Fall Documentation         Date & Time of fall: 5/4/24, unknown time of fall           Cause of fall:  Mechanical           Location:  Bathing           Was the fall witnessed?                  If yes, by who? No            Actions taken by patient:  Per ER report, pt was getting out the of shower, trabb ed the towel rack, started to fall, the towel rack pulled off the wall and the pt fell to the ground            Any new injury?                   If yes, what kind?  Yes, Details: lower back pain, no fracture found            Any recent medication changes?    No            Did patient have appropriate DME available?  Yes                          Actions Taken: Patient called for EMS, transferred to ER, no fracture found, upon hospital d ischarge pt was transferred to [facility].

## 2024-05-13 NOTE — Clinical Note
LATE ENTRY   HH Fall Documentation         Date & Time of fall: 5/4/24, unknown time of fall           Cause of fall:  Mechanical           Location:  Bathing           Was the fall witnessed?                  If yes, by who? No            Actions taken by patient:  Per ER report, pt was getting out the of shower, trabbed the towel rack, started to fall, the towel rack pulled off the wall and the pt fell to the ground            Any new injury?                   If yes, what kind?  Yes, Details: lower back pain, no fracture found            Any recent medication changes?    No            Did patient have appropriate DME available?  Yes                          Actions Taken: Patient called for EMS, transferred to ER, no fracture found, upon hospital discharge pt was transferred to [facility].

## 2024-05-15 ENCOUNTER — HOME CARE VISIT (OUTPATIENT)
Dept: HOME HEALTH SERVICES | Facility: HOME HEALTHCARE | Age: 84
End: 2024-05-15
Payer: MEDICARE

## 2024-05-15 NOTE — CASE COMMUNICATION
Patient discussed today in interdisciplinary team meeting. Rapidly readmitted with fall & transferred to SNF.

## 2024-05-17 ENCOUNTER — HOME CARE VISIT (OUTPATIENT)
Dept: HOME HEALTH SERVICES | Facility: HOME HEALTHCARE | Age: 84
End: 2024-05-17
Payer: MEDICARE

## 2024-05-17 ENCOUNTER — HOSPITAL ENCOUNTER (OUTPATIENT)
Facility: MEDICAL CENTER | Age: 84
End: 2024-05-18
Attending: EMERGENCY MEDICINE | Admitting: HOSPITALIST
Payer: MEDICARE

## 2024-05-17 ENCOUNTER — APPOINTMENT (OUTPATIENT)
Dept: RADIOLOGY | Facility: MEDICAL CENTER | Age: 84
End: 2024-05-17
Attending: EMERGENCY MEDICINE
Payer: MEDICARE

## 2024-05-17 ENCOUNTER — APPOINTMENT (OUTPATIENT)
Dept: CARDIOLOGY | Facility: MEDICAL CENTER | Age: 84
End: 2024-05-17
Attending: HOSPITALIST
Payer: MEDICARE

## 2024-05-17 DIAGNOSIS — R07.9 CHEST PAIN, UNSPECIFIED TYPE: ICD-10-CM

## 2024-05-17 DIAGNOSIS — I10 ESSENTIAL HYPERTENSION: ICD-10-CM

## 2024-05-17 DIAGNOSIS — I48.19 PERSISTENT ATRIAL FIBRILLATION (HCC): ICD-10-CM

## 2024-05-17 DIAGNOSIS — Z86.79 H/O CHF: ICD-10-CM

## 2024-05-17 PROBLEM — I48.0 PAROXYSMAL ATRIAL FIBRILLATION (HCC): Status: ACTIVE | Noted: 2024-05-17

## 2024-05-17 PROBLEM — G30.9 MODERATE ALZHEIMER'S DEMENTIA WITHOUT BEHAVIORAL DISTURBANCE, PSYCHOTIC DISTURBANCE, MOOD DISTURBANCE, OR ANXIETY (HCC): Status: ACTIVE | Noted: 2024-05-17

## 2024-05-17 PROBLEM — F02.B0 MODERATE ALZHEIMER'S DEMENTIA WITHOUT BEHAVIORAL DISTURBANCE, PSYCHOTIC DISTURBANCE, MOOD DISTURBANCE, OR ANXIETY (HCC): Status: ACTIVE | Noted: 2024-05-17

## 2024-05-17 PROBLEM — R79.89 ELEVATED TROPONIN: Status: ACTIVE | Noted: 2024-05-17

## 2024-05-17 LAB
ALBUMIN SERPL BCP-MCNC: 4.1 G/DL (ref 3.2–4.9)
ALBUMIN/GLOB SERPL: 1.5 G/DL
ALP SERPL-CCNC: 106 U/L (ref 30–99)
ALT SERPL-CCNC: 13 U/L (ref 2–50)
ANION GAP SERPL CALC-SCNC: 15 MMOL/L (ref 7–16)
AST SERPL-CCNC: 21 U/L (ref 12–45)
BASOPHILS # BLD AUTO: 0.9 % (ref 0–1.8)
BASOPHILS # BLD: 0.08 K/UL (ref 0–0.12)
BILIRUB SERPL-MCNC: 0.6 MG/DL (ref 0.1–1.5)
BUN SERPL-MCNC: 41 MG/DL (ref 8–22)
CALCIUM ALBUM COR SERPL-MCNC: 9.8 MG/DL (ref 8.5–10.5)
CALCIUM SERPL-MCNC: 9.9 MG/DL (ref 8.5–10.5)
CHLORIDE SERPL-SCNC: 98 MMOL/L (ref 96–112)
CO2 SERPL-SCNC: 24 MMOL/L (ref 20–33)
CREAT SERPL-MCNC: 1.37 MG/DL (ref 0.5–1.4)
EKG IMPRESSION: NORMAL
EOSINOPHIL # BLD AUTO: 0.36 K/UL (ref 0–0.51)
EOSINOPHIL NFR BLD: 3.9 % (ref 0–6.9)
ERYTHROCYTE [DISTWIDTH] IN BLOOD BY AUTOMATED COUNT: 49.5 FL (ref 35.9–50)
GFR SERPLBLD CREATININE-BSD FMLA CKD-EPI: 38 ML/MIN/1.73 M 2
GLOBULIN SER CALC-MCNC: 2.8 G/DL (ref 1.9–3.5)
GLUCOSE SERPL-MCNC: 150 MG/DL (ref 65–99)
HCT VFR BLD AUTO: 44 % (ref 37–47)
HGB BLD-MCNC: 14.2 G/DL (ref 12–16)
IMM GRANULOCYTES # BLD AUTO: 0.03 K/UL (ref 0–0.11)
IMM GRANULOCYTES NFR BLD AUTO: 0.3 % (ref 0–0.9)
LV EJECT FRACT  99904: 60
LV EJECT FRACT MOD 2C 99903: 51.68
LV EJECT FRACT MOD 4C 99902: 52.36
LV EJECT FRACT MOD BP 99901: 53.64
LYMPHOCYTES # BLD AUTO: 2.01 K/UL (ref 1–4.8)
LYMPHOCYTES NFR BLD: 21.5 % (ref 22–41)
MCH RBC QN AUTO: 31.8 PG (ref 27–33)
MCHC RBC AUTO-ENTMCNC: 32.3 G/DL (ref 32.2–35.5)
MCV RBC AUTO: 98.4 FL (ref 81.4–97.8)
MONOCYTES # BLD AUTO: 0.7 K/UL (ref 0–0.85)
MONOCYTES NFR BLD AUTO: 7.5 % (ref 0–13.4)
NEUTROPHILS # BLD AUTO: 6.15 K/UL (ref 1.82–7.42)
NEUTROPHILS NFR BLD: 65.9 % (ref 44–72)
NRBC # BLD AUTO: 0 K/UL
NRBC BLD-RTO: 0 /100 WBC (ref 0–0.2)
NT-PROBNP SERPL IA-MCNC: 619 PG/ML (ref 0–125)
PLATELET # BLD AUTO: 258 K/UL (ref 164–446)
PMV BLD AUTO: 10 FL (ref 9–12.9)
POTASSIUM SERPL-SCNC: 4.1 MMOL/L (ref 3.6–5.5)
PROT SERPL-MCNC: 6.9 G/DL (ref 6–8.2)
RBC # BLD AUTO: 4.47 M/UL (ref 4.2–5.4)
SODIUM SERPL-SCNC: 137 MMOL/L (ref 135–145)
TROPONIN T SERPL-MCNC: 19 NG/L (ref 6–19)
TROPONIN T SERPL-MCNC: 20 NG/L (ref 6–19)
TROPONIN T SERPL-MCNC: 21 NG/L (ref 6–19)
TROPONIN T SERPL-MCNC: 21 NG/L (ref 6–19)
TSH SERPL DL<=0.005 MIU/L-ACNC: 0.91 UIU/ML (ref 0.38–5.33)
WBC # BLD AUTO: 9.3 K/UL (ref 4.8–10.8)

## 2024-05-17 PROCEDURE — 93306 TTE W/DOPPLER COMPLETE: CPT | Mod: 26 | Performed by: INTERNAL MEDICINE

## 2024-05-17 PROCEDURE — 99222 1ST HOSP IP/OBS MODERATE 55: CPT | Performed by: HOSPITALIST

## 2024-05-17 RX ORDER — FUROSEMIDE 40 MG/1
40 TABLET ORAL 2 TIMES DAILY
Status: ON HOLD | COMMUNITY
End: 2024-05-18

## 2024-05-17 RX ORDER — ACETAMINOPHEN 325 MG/1
650 TABLET ORAL EVERY 6 HOURS PRN
Status: DISCONTINUED | OUTPATIENT
Start: 2024-05-17 | End: 2024-05-18 | Stop reason: HOSPADM

## 2024-05-17 RX ORDER — SODIUM CHLORIDE 9 MG/ML
1000 INJECTION, SOLUTION INTRAVENOUS ONCE
Status: COMPLETED | OUTPATIENT
Start: 2024-05-17 | End: 2024-05-18

## 2024-05-17 RX ORDER — HYDRALAZINE HYDROCHLORIDE 20 MG/ML
10 INJECTION INTRAMUSCULAR; INTRAVENOUS EVERY 4 HOURS PRN
Status: DISCONTINUED | OUTPATIENT
Start: 2024-05-17 | End: 2024-05-18 | Stop reason: HOSPADM

## 2024-05-17 RX ORDER — MIRABEGRON 50 MG/1
50 TABLET, EXTENDED RELEASE ORAL DAILY
Status: DISCONTINUED | OUTPATIENT
Start: 2024-05-17 | End: 2024-05-17

## 2024-05-17 RX ORDER — ONDANSETRON 4 MG/1
4 TABLET, ORALLY DISINTEGRATING ORAL EVERY 4 HOURS PRN
Status: DISCONTINUED | OUTPATIENT
Start: 2024-05-17 | End: 2024-05-18 | Stop reason: HOSPADM

## 2024-05-17 RX ORDER — ACETAMINOPHEN 500 MG
1000 TABLET ORAL 3 TIMES DAILY PRN
COMMUNITY

## 2024-05-17 RX ORDER — LEVOTHYROXINE SODIUM 0.05 MG/1
50 TABLET ORAL
Status: DISCONTINUED | OUTPATIENT
Start: 2024-05-18 | End: 2024-05-18 | Stop reason: HOSPADM

## 2024-05-17 RX ORDER — GLYCOPYRROLATE 0.2 MG/ML
0.2 INJECTION INTRAMUSCULAR; INTRAVENOUS ONCE
Status: COMPLETED | OUTPATIENT
Start: 2024-05-17 | End: 2024-05-18

## 2024-05-17 RX ORDER — LISINOPRIL 10 MG/1
5 TABLET ORAL DAILY
Status: DISCONTINUED | OUTPATIENT
Start: 2024-05-18 | End: 2024-05-18 | Stop reason: HOSPADM

## 2024-05-17 RX ORDER — ENOXAPARIN SODIUM 100 MG/ML
40 INJECTION SUBCUTANEOUS DAILY
Status: DISCONTINUED | OUTPATIENT
Start: 2024-05-17 | End: 2024-05-17

## 2024-05-17 RX ORDER — ONDANSETRON 2 MG/ML
4 INJECTION INTRAMUSCULAR; INTRAVENOUS EVERY 4 HOURS PRN
Status: DISCONTINUED | OUTPATIENT
Start: 2024-05-17 | End: 2024-05-18 | Stop reason: HOSPADM

## 2024-05-17 RX ADMIN — SODIUM CHLORIDE 1000 ML: 9 INJECTION, SOLUTION INTRAVENOUS at 20:15

## 2024-05-17 RX ADMIN — SERTRALINE 50 MG: 50 TABLET, FILM COATED ORAL at 21:28

## 2024-05-17 RX ADMIN — RIVAROXABAN 15 MG: 15 TABLET, FILM COATED ORAL at 17:52

## 2024-05-17 ASSESSMENT — LIFESTYLE VARIABLES
TOTAL SCORE: 0
EVER HAD A DRINK FIRST THING IN THE MORNING TO STEADY YOUR NERVES TO GET RID OF A HANGOVER: NO
TOTAL SCORE: 0
HAVE YOU EVER FELT YOU SHOULD CUT DOWN ON YOUR DRINKING: NO
ALCOHOL_USE: NO
ON A TYPICAL DAY WHEN YOU DRINK ALCOHOL HOW MANY DRINKS DO YOU HAVE: 0
HOW MANY TIMES IN THE PAST YEAR HAVE YOU HAD 5 OR MORE DRINKS IN A DAY: 0
AVERAGE NUMBER OF DAYS PER WEEK YOU HAVE A DRINK CONTAINING ALCOHOL: 0
HAVE PEOPLE ANNOYED YOU BY CRITICIZING YOUR DRINKING: NO
EVER FELT BAD OR GUILTY ABOUT YOUR DRINKING: NO
CONSUMPTION TOTAL: NEGATIVE
DOES PATIENT WANT TO STOP DRINKING: NO
TOTAL SCORE: 0

## 2024-05-17 ASSESSMENT — CHA2DS2 SCORE
SEX: FEMALE
CHA2DS2 VASC SCORE: 5
DIABETES: NO
HYPERTENSION: YES
AGE 65 TO 74: NO
PRIOR STROKE OR TIA OR THROMBOEMBOLISM: NO
AGE 75 OR GREATER: YES
VASCULAR DISEASE: YES
CHF OR LEFT VENTRICULAR DYSFUNCTION: NO

## 2024-05-17 ASSESSMENT — HEART SCORE
HISTORY: SLIGHTLY SUSPICIOUS
AGE: 65+
ECG: NON-SPECIFIC REPOLARIZATION DISTURBANCE
TROPONIN: 1-3 TIMES NORMAL LIMIT
RISK FACTORS: >2 RISK FACTORS OR HX OF ATHEROSCLEROTIC DISEASE
HEART SCORE: 6

## 2024-05-17 ASSESSMENT — SOCIAL DETERMINANTS OF HEALTH (SDOH)
WITHIN THE LAST YEAR, HAVE YOU BEEN AFRAID OF YOUR PARTNER OR EX-PARTNER?: NO
WITHIN THE PAST 12 MONTHS, THE FOOD YOU BOUGHT JUST DIDN'T LAST AND YOU DIDN'T HAVE MONEY TO GET MORE: NEVER TRUE
WITHIN THE LAST YEAR, HAVE YOU BEEN HUMILIATED OR EMOTIONALLY ABUSED IN OTHER WAYS BY YOUR PARTNER OR EX-PARTNER?: NO
WITHIN THE LAST YEAR, HAVE TO BEEN RAPED OR FORCED TO HAVE ANY KIND OF SEXUAL ACTIVITY BY YOUR PARTNER OR EX-PARTNER?: NO
IN THE PAST 12 MONTHS, HAS THE ELECTRIC, GAS, OIL, OR WATER COMPANY THREATENED TO SHUT OFF SERVICE IN YOUR HOME?: NO
WITHIN THE PAST 12 MONTHS, YOU WORRIED THAT YOUR FOOD WOULD RUN OUT BEFORE YOU GOT THE MONEY TO BUY MORE: NEVER TRUE
WITHIN THE LAST YEAR, HAVE YOU BEEN KICKED, HIT, SLAPPED, OR OTHERWISE PHYSICALLY HURT BY YOUR PARTNER OR EX-PARTNER?: NO

## 2024-05-17 ASSESSMENT — PAIN DESCRIPTION - PAIN TYPE
TYPE: ACUTE PAIN
TYPE: ACUTE PAIN

## 2024-05-17 ASSESSMENT — FIBROSIS 4 INDEX
FIB4 SCORE: 2.89
FIB4 SCORE: 1.92

## 2024-05-17 NOTE — PROGRESS NOTES
2 RN Skin Assessment Completed by Kyara RN and Edgardo RN.     Head: right cheek redness  Ears: Redness behind ears  Nose: WDL  Mouth: Missing one tooth on lower right   Neck: WDL  Breasts/Chest: WDL  Shoulder Blades: WDL  Spine: WDL  (R) Arm/Elbow/hand: Two bruises on forearm. Right shoulder bruise.  (L) Arm/Elbow/hand: Left hand bruise on anterior  Abdomen:WDL  Groin: WDL  Sacrum/Coccyx/Buttocks: Healing pressure ulcer, intact. Red and blanching.  (R) Leg: WDL, dryness  (L) Leg: surgical scar, dryness  (R) Heel/Foot/Toe: right heal scar  (L) Heel/Foot/Toe: blanching              Devices in place: BP Cuff and Pulse Ox, tele box     Interventions in place: Pillows     Possible skin injury found: Yes, chronic     Pictures uploaded into Epic: Yes  Wound Consult Placed: N/A

## 2024-05-17 NOTE — DISCHARGE PLANNING
Update by Johann ZHANG that patient is on service with [facility].

## 2024-05-17 NOTE — H&P
Hospital Medicine History & Physical Note    Date of Service  5/17/2024    Primary Care Physician  Jolie Escobar M.D.    Consultants      Code Status  DNAR/DNI    Chief Complaint  Chief Complaint   Patient presents with    Chest Pain     Sudden onset while watching TV this AM, lasted approx 10 minutes and self resolved, arrives without chest pain, hx CHF, A-fib, pacemaker        History of Presenting Illness  Donte Magaña is a 83 y.o. female who presented 5/17/2024 with pmx afib, , pacemaker ,hypothyroid, dementia who was brought in via ambulance as the patient was apparently watching TV and supposedly patient was having chest pain.  This suppose a chest pain lasted for 10 minutes and resolved on its own.  This was the information that was relayed to the ambulance team.  The patient arrives to [facility] ED she knows that she is in the hospital but she denies any chest pain or shortness of breath.  She does not know why she is in the hospital    Patient referred to me for the care and management    I discussed the plan of care with bedside RN.    Review of Systems  Review of Systems   Unable to perform ROS: Dementia       Past Medical History   has a past medical history of A-fib ([facility]), Anesthesia, Anticoagulant long-term use (1/12/2012), Arthritis, Atrial fibrillation ([facility]), Backpain, Bowel habit changes, Breath shortness, Bronchitis (Nov, 2013), CAD (coronary artery disease), Depression, Glaucoma (5/3/2011), Hematoma complicating a procedure (11/3/2012), Hemorrhagic disorder ([facility]), Hypertension, Hypothyroid, Lupus ([facility]), Macular degeneration, Menopause (1/12/2012), Mitral regurgitation (10/30/2012), Obesity (1/12/2012), Pacemaker (2018), Pneumonia (feb,2013), Pre-syncope (6/29/2018), Pulmonary hypertension (HCC) (10/30/2012), PVC's (premature ventricular contractions) (1/12/2012), Senile nuclear sclerosis, Spinal stenosis of lumbar region at multiple levels, Unspecified cataract, Unspecified urinary  "incontinence, and Urinary bladder disorder.    Surgical History   has a past surgical history that includes tonsillectomy and adenoidectomy; recovery (11/30/2010); colonoscopy (2008); abdominal hysterectomy total (April 15,1975); other; cataract phaco with iol (4/21/2015); cataract phaco with iol (Right, 5/5/2015); pacemaker insertion (06/30/2018); open reduction; other cardiac surgery (12/2017 and 07/19/2018 ); hip arth anterior total (Right, 1/17/2019); irrigation & debridement ortho (Right, 2/14/2019); pr combined ant/post colporrhaphy (1/14/2020); pr lap,diagnostic abdomen (1/14/2020); enterocele repair (1/14/2020); bladder sling female (1/14/2020); vaginal suspension (1/14/2020); salpingo oophorectomy (Bilateral, 1/14/2020); knee arthroplasty total (Left, 5/28/2015); and knee arthroplasty total (Right, 6/23/2016).     Family History  family history includes Cancer in her paternal aunt; Diabetes in her father; GI Disease in her daughter; Heart Disease in her brother and daughter; Other in her daughter; Stroke in her mother, sister, and sister.   Family history reviewed with patient. There is no family history that is pertinent to the chief complaint.     Social History   reports that she has never smoked. She has never used smokeless tobacco. She reports that she does not drink alcohol and does not use drugs.    Allergies  Allergies   Allergen Reactions    Amiodarone Hives     Throat and tongue itching    Bactrim Shortness of Breath    Cipro Xr Swelling    Metoprolol Swelling     Causes throat swelling    Morphine Unspecified     Hallucinations    Phytoplex Z-Guard [Petrolatum-Zinc Oxide] Unspecified     \"causes burning\"    Pseudoephedrine Palpitations    Qvar [Beclomethasone Dipropionate] Unspecified     Pressure on heart      Vibramycin Shortness of Breath    Atorvastatin Calcium-Polysorbate 80 Unspecified     Muscle aches      Augmentin Unspecified     Unknown reaction    Diltiazem Rash     rash    Flecainide " "Unspecified     dizziness    Keflex Unspecified     Pt states \"Unsure\".    Mucinex Unspecified     GI Distress      Tramadol Unspecified     crying    Atorvastatin Myalgia    Tape Rash     Paper tape okay       Medications  Prior to Admission Medications   Prescriptions Last Dose Informant Patient Reported? Taking?   Mirabegron ER (MYRBETRIQ) 50 MG TABLET SR 24 HR 5/17/2024 at 0800 MAR from Other Facility Yes Yes   Sig: Take 50 mg by mouth every day. Indications: Overactive Bladder   acetaminophen (TYLENOL) 500 MG Tab 5/3/2024 at 0835 MAR from Other Facility Yes Yes   Sig: Take 1,000 mg by mouth 3 times a day as needed for Mild Pain. 2 tablets = 1,000 mg.   atenolol (TENORMIN) 50 MG Tab 5/16/2024 at 2000 MAR from Other Facility No Yes   Sig: Take 1 Tablet by mouth every evening. Indications: High Blood Pressure Disorder   fluticasone-umeclidinium-vilanterol (TRELEGY ELLIPTA) 100-62.5-25 mcg/act inhaler 5/17/2024 at 0800 MAR from Other Facility No Yes   Sig: Inhale 1 Puff every day.   furosemide (LASIX) 40 MG Tab 5/17/2024 at 0800 MAR from Other Facility Yes Yes   Sig: Take 40 mg by mouth 2 times a day.   levothyroxine (SYNTHROID) 50 MCG Tab 5/17/2024 at 0600 MAR from Other Facility Yes Yes   Sig: Take 50 mcg by mouth every morning on an empty stomach.   lisinopril (PRINIVIL) 5 MG Tab 5/17/2024 at 0800 MAR from Other Facility Yes Yes   Sig: Take 5 mg by mouth every day. Indications: High Blood Pressure Disorder   magnesium oxide (MAG-OX) 400 MG Tab tablet 5/17/2024 at 0800 MAR from Other Facility Yes Yes   Sig: Take 400 mg by mouth every day.   rivaroxaban (XARELTO) 20 MG Tab tablet 5/16/2024 at 1700 MAR from Other Facility Yes Yes   Sig: Take 20 mg by mouth with dinner.   sertraline (ZOLOFT) 50 MG Tab 5/16/2024 at 2000 MAR from Other Facility No Yes   Sig: Take 1 Tablet by mouth every day. Indications: Major Depressive Disorder      Facility-Administered Medications: None       Physical Exam  Temp:  [36.1 °C (97 " °F)-36.7 °C (98.1 °F)] 36.1 °C (97 °F)  Pulse:  [80-97] 97  Resp:  [13-20] 18  BP: (117-135)/(56-90) 135/90  SpO2:  [94 %-98 %] 98 %  Blood Pressure : (!) 135/90   Temperature: 36.1 °C (97 °F)   Pulse: 97   Respiration: 18   Pulse Oximetry: 98 %       Physical Exam  Constitutional:       General: She is not in acute distress.     Appearance: She is not ill-appearing.   HENT:      Mouth/Throat:      Mouth: Mucous membranes are moist.   Eyes:      Pupils: Pupils are equal, round, and reactive to light.   Cardiovascular:      Rate and Rhythm: Normal rate and regular rhythm.   Pulmonary:      Effort: No respiratory distress.      Breath sounds: No stridor. No wheezing or rhonchi.   Abdominal:      General: There is no distension.      Palpations: There is no mass.      Tenderness: There is no abdominal tenderness.      Hernia: No hernia is present.   Musculoskeletal:      Cervical back: Normal range of motion.      Right lower leg: Edema present.      Left lower leg: Edema present.   Skin:     Capillary Refill: Capillary refill takes 2 to 3 seconds.      Coloration: Skin is pale.   Neurological:      General: No focal deficit present.      Mental Status: She is disoriented.      Motor: Weakness present.      Comments: Oriented to self and place only   Psychiatric:         Mood and Affect: Mood normal.         Laboratory:  Recent Labs     05/17/24  0954   WBC 9.3   RBC 4.47   HEMOGLOBIN 14.2   HEMATOCRIT 44.0   MCV 98.4*   MCH 31.8   MCHC 32.3   RDW 49.5   PLATELETCT 258   MPV 10.0     Recent Labs     05/17/24  0954   SODIUM 137   POTASSIUM 4.1   CHLORIDE 98   CO2 24   GLUCOSE 150*   BUN 41*   CREATININE 1.37   CALCIUM 9.9     Recent Labs     05/17/24  0954   ALTSGPT 13   ASTSGOT 21   ALKPHOSPHAT 106*   TBILIRUBIN 0.6   GLUCOSE 150*         Recent Labs     05/17/24  1146   NTPROBNP 619*         Recent Labs     05/17/24  0954 05/17/24  1146 05/17/24  1311   TROPONINT 21* 20* 19       Imaging:  DX-CHEST-PORTABLE (1 VIEW)    Final Result      - -      EC-ECHOCARDIOGRAM COMPLETE W/O CONT    (Results Pending)       EKG:  I have personally reviewed the images and compared with prior images.    Dual paced rhythm rate of 81    Assessment/Plan:  Justification for Admission Status  I anticipate this patient is appropriate for observation status at this time because I suspect patient will require less than 48 hours for further evaluation of her apparent chest pain episode        * Elevated troponin- (present on admission)  Assessment & Plan  Patient will be admitted to evaluate for ACS    Follow serial troponins    Monitor on telemetry    Check echo        Moderate Alzheimer's dementia without behavioral disturbance, psychotic disturbance, mood disturbance, or anxiety (HCC)- (present on admission)  Assessment & Plan  Avoid sedatives    Frequent reorientation    Primary hypertension- (present on admission)  Assessment & Plan  BP is low normal    Continue lisinopril and hold atenolol    Acquired hypothyroidism- (present on admission)  Assessment & Plan  Continue Synthroid  Check TSH        VTE prophylaxis: SCDs/TEDs

## 2024-05-17 NOTE — DISCHARGE PLANNING
HTH/SCP TCN chart review completed. Collaborated with ANTOINETTE Mendoza prior to meeting with the pt. The most current review of medical record, knowledge of pt's PLOF and social support, LACE+ score of 76, 6 clicks scores unavailable, though note per review, pt remains ambulatory with steady gait with FWW.    Pt seen at bedside. Introduced TCN program. Provided education regarding post acute levels of care. Education provided regarding case management policy for blanket SNF referrals. Discussed HTH/SCP plan benefits. Pt verbalizes understanding.     Note that pt was recently at [facility] and had dc'd back to her home at [place] at [facility]. She was slated to begin service with [facility] prior to admit. As pt is currently observation, anticipating that pt will return to her [facility] with resumption of RHH services. Pt reports she is in agreement with plan. Noted baseline cognitive deficits (which pt appears aware of memory issues as well). She has her FWW in her room currently as well.    No additional provider requests at this time. Given aforementioned, no choice indicated at this time as anticipating [facility] with [facility] of [facility] (observation status currently and does not need choice to resume RHH).  TCN will continue to follow and collaborate with discharge planning team as additional post acute needs arise. Thank you.     Completed today:  Choice obtained: none indicated/needed; see above  SCP with [facility] PCP. Pt has upcoming transitional PCP appt on 5/21

## 2024-05-17 NOTE — PROGRESS NOTES
Pt arrived on floor. Oriented to room and ambulated to bed with SBA and using FWW. Pt steady on feet. Fall precautions initiated and pt denies any pain. Pending Echo. Pt A&ox 3, disoriented to situation. Admission charting complete with telephone call to daughter Reina. Pt passed dysphagia screen with RN.

## 2024-05-17 NOTE — ED NOTES
Unable to address med rec at this time. Patient is a resident at [facility] living. MAR not sent with patient. Contacted facility ([phone]) to request copy of MAR at 11:22.    Addendum: Second request for MAR at 11:52.    [place]enum: Med rec is partially complete per partial MAR received from [facility]. Page number at bottom of MAR indicates that only page 1 of 3 was received. Requested that MAR be resent at 12:17.    Addendum: Med rec updated/complete per MAR received from [facility].    Allergies reviewed per MAR.    No antibiotics noted on MAR for May 2024.    ANTICOAGULATION: Patient is on XARELTO; last dose 5/16/2024 at 17:00.

## 2024-05-17 NOTE — ED TRIAGE NOTES
"Chief Complaint   Patient presents with    Chest Pain     Sudden onset while watching TV this AM, lasted approx 10 minutes and self resolved, arrives without chest pain, hx CHF, A-fib, pacemaker      Pt BIB [facility] for above complaints, VSS on RA, GCS 15, NAD.    Pt was given 324 ASA en route by EMS.    /60   Pulse 82   Temp 36.7 °C (98.1 °F) (Temporal)   Resp 20   Ht 1.6 m (5' 3\")   Wt 76.7 kg (169 lb)   LMP 01/10/1975   SpO2 98%   BMI 29.94 kg/m²     "

## 2024-05-17 NOTE — Clinical Note
Arrived at [facility] with [facility]. [place] caregiver called 911 because [place] is having chest pain. EMS transported Donte to [facility] for further evaluation.

## 2024-05-17 NOTE — Clinical Note
noted  ----- Message -----  From: Olivia Dudley R.N.  Sent: 5/17/2024   9:33 AM PDT  To: Chela Mohamud R.N.; Sneha Medina R.N.; *      Arrived at [facility] with [facility]. [place] caregiver called 911 because [place] is having chest pain. EMS transported Donte to [facility] for further evaluation.

## 2024-05-17 NOTE — ED PROVIDER NOTES
"CHIEF COMPLAINT  Chief Complaint   Patient presents with    Chest Pain     Sudden onset while watching TV this AM, lasted approx 10 minutes and self resolved, arrives without chest pain, hx CHF, A-fib, pacemaker        LIMITATION TO HISTORY   Select: none    HPI    Donte Magaña is a 83 y.o. female who presents to the Emergency Department for evaluation of chest pain onset this morning. She denies any chest pain, shortness of breath, fever, or chills. Patient says was at [facility] and was transmitted by ambulance. Patient does not recall her events prior to EMS, nor does she recall where she was. Patient is aware she is in the hospital and knows the date. Currently, the patient feels normal. She reports a history of dementia. She has a history of CHF, A-fib, and a pacemaker. Per nurse, the patient was found at [place] appearing tensed up in her chair.     OUTSIDE HISTORIAN(S):  Nurse provided history about the patient.     EXTERNAL RECORDS REVIEWED  Patient had an Echo conducted on 4/8/24 that revealed: Normal left ventricular size and systolic function. Grade II diastolic dysfunction. Mild mitral regurgitation. Moderate aortic insufficiency. Normal aortic root for body surface area. Compared to the prior study on 6/30/2023, similar findings. Patient has a [employer] Dual Chamber Pacemaker in place. Recently hospitalized on 4/7/2024 for shortness of breath , cough, Afib, and pneumonia.    PAST MEDICAL HISTORY  Past Medical History:   Diagnosis Date    A-fib (HCC)     Anesthesia     \"Tachycardia for 5 days after cataract surgery\"    Anticoagulant long-term use 1/12/2012    Arthritis     osteo-Knees, hips    Atrial fibrillation (HCC)     Backpain     R hip    Bowel habit changes     diarrhea    Breath shortness     with exertion, prn O2 2L    Bronchitis Nov, 2013    CAD (coronary artery disease)     Depression     Glaucoma 5/3/2011    Hematoma complicating a procedure 11/3/2012    Hemorrhagic disorder " (HCC)     bruising/coumadin    Hypertension     Hypothyroid     Lupus (HCC)     Macular degeneration     Menopause 1/12/2012    Mitral regurgitation 10/30/2012    Obesity 1/12/2012    Pacemaker 2018    Pneumonia feb,2013    Pre-syncope 6/29/2018    Pulmonary hypertension (HCC) 10/30/2012    PVC's (premature ventricular contractions) 1/12/2012    Senile nuclear sclerosis     Spinal stenosis of lumbar region at multiple levels     Unspecified cataract     repaired bilateral    Unspecified urinary incontinence     Urinary bladder disorder      .    SURGICAL HISTORY  Past Surgical History:   Procedure Laterality Date    IL COMBINED ANT/POST COLPORRHAPHY  1/14/2020    Procedure: COLPORRHAPHY, COMBINED ANTEROPOSTERIOR - PERINEOPLASTY;  Surgeon: Waqas Robin M.D.;  Location: SURGERY SAME DAY [facility];  Service: Gynecology    IL LAP,DIAGNOSTIC ABDOMEN  1/14/2020    Procedure: PELVISCOPY;  Surgeon: Waqas Robin M.D.;  Location: SURGERY SAME DAY [facility];  Service: Gynecology    ENTEROCELE REPAIR  1/14/2020    Procedure: REPAIR, ENTEROCELE;  Surgeon: Wqaas Robin M.D.;  Location: SURGERY SAME DAY [facility];  Service: Gynecology    BLADDER SLING FEMALE  1/14/2020    Procedure: BLADDER SLING, FEMALE - TOT;  Surgeon: Waqas Robin M.D.;  Location: SURGERY SAME DAY [facility] OR[facility];  Service: Gynecology    VAGINAL SUSPENSION  1/14/2020    Procedure: COLPOPEXY - SACROSPINOUS VAULT SUSPENSION;  Surgeon: Waqas Robin M.D.;  Location: SURGERY SAME DAY [facility];  Service: Gynecology    SALPINGO OOPHORECTOMY Bilateral 1/14/2020    Procedure: SALPINGO-OOPHORECTOMY;  Surgeon: Waqas Robin M.D.;  Location: SURGERY SAME DAY [facility];  Service: Gynecology    IRRIGATION & DEBRIDEMENT ORTHO Right 2/14/2019    Procedure: IRRIGATION & DEBRIDEMENT ORTHO-HIP WOUND ;  Surgeon: Vladimir Lee M.D.;  Location: SURGERY [facility];  Service: Orthopedics    HIP ARTH ANTERIOR TOTAL Right 1/17/2019     Procedure: HIP ARTHROPLASTY ANTERIOR TOTAL;  Surgeon: Juan C Mercedes M.D.;  Location: SURGERY [facility];  Service: Orthopedics    PACEMAKER INSERTION  06/30/2018    Dual Chamber    KNEE ARTHROPLASTY TOTAL Right 6/23/2016    Procedure: KNEE ARTHROPLASTY TOTAL;  Surgeon: Heriberto Lozada M.D.;  Location: SURGERY [facility];  Service:     KNEE ARTHROPLASTY TOTAL Left 5/28/2015    Procedure: KNEE ARTHROPLASTY TOTAL;  Surgeon: Heriberto Lozada M.D.;  Location: SURGERY [facility];  Service:     CATARACT PHACO WITH IOL Right 5/5/2015    Procedure: IOL OD - STANDARD;  Surgeon: Dmitry Bejarano M.D.;  Location: SURGERY [facility];  Service:     CATARACT PHACO WITH IOL  4/21/2015    Performed by Dmitry Bejarano M.D. at [facility]    RECOVERY  11/30/2010    Performed by SURGERY, CATH-RECOVERY at SURGERY SAME DAY [facility]S    COLONOSCOPY  2008    Normal    [facility]    ABDOMINAL HYSTERECTOMY TOTAL  April 15,1975    still has ovaries    OPEN REDUCTION      left ankle    OTHER      Removed pins from left ankle    OTHER CARDIAC SURGERY  12/2017 and 07/19/2018     Cardiac Ablation    TONSILLECTOMY AND ADENOIDECTOMY           FAMILY HISTORY  Family History   Problem Relation Age of Onset    Stroke Mother     Diabetes Father     Stroke Sister     Heart Disease Brother     Stroke Sister     GI Disease Daughter         Crohn's Disease    Heart Disease Daughter         CHF    Other Daughter         Chronic Pain--Lymphedema    Cancer Paternal Aunt         breast          SOCIAL HISTORY  Social History     Tobacco Use    Smoking status: Never    Smokeless tobacco: Never   Vaping Use    Vaping status: Never Used   Substance Use Topics    Alcohol use: No    Drug use: No         CURRENT MEDICATIONS  No current facility-administered medications prior to encounter.     Current Outpatient Medications Prior to Encounter   Medication Sig Dispense Refill    cyanocobalamin (VITAMIN B12) 500 MCG tablet  "Take 1 Tablet by mouth every day for 30 days. 30 Tablet 0    levothyroxine (SYNTHROID) 50 MCG Tab Take 50 mcg by mouth every morning on an empty stomach.      magnesium oxide (MAG-OX) 400 MG Tab tablet Take 400 mg by mouth every day.      rivaroxaban (XARELTO) 20 MG Tab tablet Take 20 mg by mouth with dinner.      lisinopril (PRINIVIL) 5 MG Tab Take 5 mg by mouth every day. Indications: High Blood Pressure Disorder      Mirabegron ER (MYRBETRIQ) 50 MG TABLET SR 24 HR Take 50 mg by mouth every day. Indications: Overactive Bladder      atenolol (TENORMIN) 50 MG Tab Take 1 Tablet by mouth every evening. Indications: High Blood Pressure Disorder 30 Tablet 2    fluticasone-umeclidinium-vilanterol (TRELEGY ELLIPTA) 100-62.5-25 mcg/act inhaler Inhale 1 Puff every day. 28 Each 2    sertraline (ZOLOFT) 50 MG Tab Take 1 Tablet by mouth every day. Indications: Major Depressive Disorder 30 Tablet 2         ALLERGIES  Allergies   Allergen Reactions    Amiodarone Hives     Throat and tongue itching    Bactrim Shortness of Breath    Cipro Xr Swelling    Metoprolol Swelling     Causes throat swelling    Morphine Unspecified     Hallucinations    Phytoplex Z-Guard [Petrolatum-Zinc Oxide] Unspecified     \"causes burning\"    Pseudoephedrine Palpitations    Qvar [Beclomethasone Dipropionate] Unspecified     Pressure on heart      Vibramycin Shortness of Breath    Atorvastatin Calcium-Polysorbate 80 Unspecified     Muscle aches      Augmentin Unspecified     Unknown reaction    Diltiazem Rash     rash    Flecainide Unspecified     dizziness    Keflex Unspecified     Pt states \"Unsure\".    Mucinex Unspecified     GI Distress      Tramadol Unspecified     crying    Atorvastatin Myalgia    Tape Rash     Paper tape okay       PHYSICAL EXAM  VITAL SIGNS:/60   Pulse 82   Temp 36.7 °C (98.1 °F) (Temporal)   Resp 20   Ht 1.6 m (5' 3\")   Wt 76.7 kg (169 lb)   LMP 01/10/1975   SpO2 98%   BMI 29.94 kg/m²       Constitutional: " Well-developed no acute distress elderly in appearance very pleasant  HENT: Normocephalic, Atraumatic, Bilateral external ears normal.  Eyes:  conjunctiva are normal.   Neck: Supple.  Nontender midline  Cardiovascular: Regular rate and rhythm without murmurs gallops or rubs, Diminished but normal,  Thorax & Lungs: Lungs diminished but clear, No respiratory distress. Breathing comfortably. Lungs are clear to auscultation bilaterally, there are no wheezes no rales. Chest wall is nontender.  Abdomen: Soft, non distended, non tender   Skin: Warm, Dry, No erythema,   Back: No tenderness, No CVA tenderness.  Musculoskeletal: 2+ pitting edema in lower extremities, No clubbing cyanosis or edema good range of motion   Neurologic: Alert & oriented to person, place and time, Not oriented to events, normal sensation moving all extremities appears normal   Psychiatric: Affect normal, Judgment normal, Mood normal.     DIAGNOSTIC STUDIES / PROCEDURES    LABS  Results for orders placed or performed during the hospital encounter of 05/17/24   CBC with Differential   Result Value Ref Range    WBC 9.3 4.8 - 10.8 K/uL    RBC 4.47 4.20 - 5.40 M/uL    Hemoglobin 14.2 12.0 - 16.0 g/dL    Hematocrit 44.0 37.0 - 47.0 %    MCV 98.4 (H) 81.4 - 97.8 fL    MCH 31.8 27.0 - 33.0 pg    MCHC 32.3 32.2 - 35.5 g/dL    RDW 49.5 35.9 - 50.0 fL    Platelet Count 258 164 - 446 K/uL    MPV 10.0 9.0 - 12.9 fL    Neutrophils-Polys 65.90 44.00 - 72.00 %    Lymphocytes 21.50 (L) 22.00 - 41.00 %    Monocytes 7.50 0.00 - 13.40 %    Eosinophils 3.90 0.00 - 6.90 %    Basophils 0.90 0.00 - 1.80 %    Immature Granulocytes 0.30 0.00 - 0.90 %    Nucleated RBC 0.00 0.00 - 0.20 /100 WBC    Neutrophils (Absolute) 6.15 1.82 - 7.42 K/uL    Lymphs (Absolute) 2.01 1.00 - 4.80 K/uL    Monos (Absolute) 0.70 0.00 - 0.85 K/uL    Eos (Absolute) 0.36 0.00 - 0.51 K/uL    Baso (Absolute) 0.08 0.00 - 0.12 K/uL    Immature Granulocytes (abs) 0.03 0.00 - 0.11 K/uL    NRBC (Absolute)  0.00 K/uL   Complete Metabolic Panel (CMP)   Result Value Ref Range    Sodium 137 135 - 145 mmol/L    Potassium 4.1 3.6 - 5.5 mmol/L    Chloride 98 96 - 112 mmol/L    Co2 24 20 - 33 mmol/L    Anion Gap 15.0 7.0 - 16.0    Glucose 150 (H) 65 - 99 mg/dL    Bun 41 (H) 8 - 22 mg/dL    Creatinine 1.37 0.50 - 1.40 mg/dL    Calcium 9.9 8.5 - 10.5 mg/dL    Correct Calcium 9.8 8.5 - 10.5 mg/dL    AST(SGOT) 21 12 - 45 U/L    ALT(SGPT) 13 2 - 50 U/L    Alkaline Phosphatase 106 (H) 30 - 99 U/L    Total Bilirubin 0.6 0.1 - 1.5 mg/dL    Albumin 4.1 3.2 - 4.9 g/dL    Total Protein 6.9 6.0 - 8.2 g/dL    Globulin 2.8 1.9 - 3.5 g/dL    A-G Ratio 1.5 g/dL   Troponins NOW   Result Value Ref Range    Troponin T 21 (H) 6 - 19 ng/L   Troponins in two (2) hours   Result Value Ref Range    Troponin T 20 (H) 6 - 19 ng/L   ESTIMATED GFR   Result Value Ref Range    GFR (CKD-EPI) 38 (A) >60 mL/min/1.73 m 2   TROPONIN   Result Value Ref Range    Troponin T 19 6 - 19 ng/L   TSH   Result Value Ref Range    TSH 0.909 0.380 - 5.330 uIU/mL   proBrain Natriuretic Peptide, NT   Result Value Ref Range    NT-proBNP 619 (H) 0 - 125 pg/mL   EKG   Result Value Ref Range    Report       [facility] Emergency Dept.    Test Date:  2024  Pt Name:    STEPHANIE DAVENPORT                 Department: ER  MRN:        [id number]                      Room:       RD 11  Gender:     Female                       Technician: [id number]  :        [id number]                   Requested By:ER TRIAGE PROTOCOL  Order #:    [id number]                    Reading MD: SIDRA VOSS MD    Measurements  Intervals                                Axis  Rate:       81                           P:          82  PA:         198                          QRS:        -72  QRSD:       142                          T:          84  QT:         442  QTc:        513    Interpretive Statements  Atrial-ventricular dual-paced rhythm  No further analysis attempted due to paced  rhythm  Compared to ECG 04/07/2024 10:23:16  No significant changes  Electronically Signed On 05- 09:59:04 PDT by SIDRA VOSS MD       *Note: Due to a large number of results and/or encounters for the requested time period, some results have not been displayed. A complete set of results can be found in Results Review.       EKG:   I have independently interpreted this EKG as detailed above.    RADIOLOGY  I have independently interpreted the diagnostic imaging associated with this visit and am waiting the final reading from the radiologist.   My preliminary interpretation is as follows: No significant infiltrates.  The patient does have a pacemaker in place      Radiologist interpretation:   DX-CHEST-PORTABLE (1 VIEW)   Final Result      - -      EC-ECHOCARDIOGRAM COMPLETE W/O CONT    (Results Pending)         COURSE & MEDICAL DECISION MAKING    ED COURSE:    ED Observation Status? No, The patient does not qualify for observation status     9:58 AM - Patient seen and examined at bedside. Discussed plan of care, including labs and imaging. Patient agrees to the plan of care. Ordered for EKG, Troponins NOW, CMP, CBC w/ Diff, Estimated GFR and DX-Chest to evaluate her symptoms.     INITIAL ASSESSMENT, COURSE AND PLAN  Care Narrative: Patient presents emerged part for evaluation.  The patient is very pleasant.  She does not remember if she had any chest pain.  She is very appropriate she knows that she is in the hospital the date and month.  At this point it is unknown if the patient has a history of any dementia but according to the staff the patient clutched her chest and started having severe distal chest discomfort.  Clinically she has nonspecific EKG changes.  Initial troponin is higher than normal cutoff.  Her heart score is 6.  Because I cannot get a completely straight history the assumption is that of acute coronary syndrome.  Because her heart score is 6 we will admit the patient for overnight  observation and restratification.  I spoke to the hospitalist for this admission.    11:16 AM - Spoke with Dr. Cortes, (Hospitalist), about the patient's condition who agreed to evaluate the patient.      11:22 AM - Patient was reevaluated at bedside. Discussed lab and radiology results with the patient and informed them about hospitalization. Patient verbalizes understanding and agreement to this plan of care.      ADDITIONAL PROBLEM LIST  History of atrial fibrillation currently with a pacemaker history of congestive heart failure    DISPOSITION AND DISCUSSIONS  I have discussed management of the patient with the following physicians/ DANNY's/ ancillary staff:  Dr. Cortes (Hospitalist)    Decision tools and prescription drugs considered including, but not limited to:  Heart Score: 6 .    DISPOSITION:  Patient will be hospitalized by Dr. Cortes in guarded condition.     FINAL DIAGNOSIS  1. Chest pain, unspecified type    2. H/O CHF       Bruno HERNANDEZ (Mandeep), am scribing for, and in the presence of, Johann Huggins M.D..    Electronically signed by: Bruno Evans (Mandeep), 5/17/2024    Johann HERNANDEZ M.D. personally performed the services described in this documentation, as scribed by Bruno Evans in my presence, and it is both accurate and complete.     Electronically signed by: Johann Huggins M.D.,3:20 PM 05/17/24

## 2024-05-17 NOTE — ASSESSMENT & PLAN NOTE
Patient will be admitted to evaluate for ACS    Follow serial troponins    Monitor on telemetry    Check echo

## 2024-05-18 ENCOUNTER — PHARMACY VISIT (OUTPATIENT)
Dept: PHARMACY | Facility: MEDICAL CENTER | Age: 84
End: 2024-05-18
Payer: COMMERCIAL

## 2024-05-18 VITALS
BODY MASS INDEX: 29.88 KG/M2 | DIASTOLIC BLOOD PRESSURE: 85 MMHG | RESPIRATION RATE: 15 BRPM | HEIGHT: 63 IN | OXYGEN SATURATION: 92 % | WEIGHT: 168.65 LBS | SYSTOLIC BLOOD PRESSURE: 153 MMHG | TEMPERATURE: 98.7 F | HEART RATE: 80 BPM

## 2024-05-18 PROBLEM — R79.89 ELEVATED TROPONIN: Status: RESOLVED | Noted: 2024-05-17 | Resolved: 2024-05-18

## 2024-05-18 LAB — TROPONIN T SERPL-MCNC: 21 NG/L (ref 6–19)

## 2024-05-18 PROCEDURE — RXMED WILLOW AMBULATORY MEDICATION CHARGE: Performed by: HOSPITALIST

## 2024-05-18 PROCEDURE — 99239 HOSP IP/OBS DSCHRG MGMT >30: CPT | Performed by: HOSPITALIST

## 2024-05-18 RX ORDER — ATENOLOL 50 MG/1
25 TABLET ORAL EVERY EVENING
Status: SHIPPED
Start: 2024-05-18 | End: 2024-05-18

## 2024-05-18 RX ORDER — FUROSEMIDE 20 MG/1
20 TABLET ORAL DAILY
Qty: 30 TABLET | Refills: 0 | Status: SHIPPED | OUTPATIENT
Start: 2024-05-18

## 2024-05-18 RX ORDER — ATENOLOL 25 MG/1
25 TABLET ORAL DAILY
Qty: 30 TABLET | Refills: 0 | Status: SHIPPED | OUTPATIENT
Start: 2024-05-18

## 2024-05-18 RX ORDER — ATENOLOL 50 MG/1
50 TABLET ORAL EVERY EVENING
Status: SHIPPED
Start: 2024-05-18 | End: 2024-05-18

## 2024-05-18 RX ORDER — FUROSEMIDE 40 MG/1
20 TABLET ORAL DAILY
Status: SHIPPED
Start: 2024-05-18 | End: 2024-05-18

## 2024-05-18 RX ADMIN — LEVOTHYROXINE SODIUM 50 MCG: 0.05 TABLET ORAL at 05:08

## 2024-05-18 RX ADMIN — GLYCOPYRROLATE 0.2 MG: 0.2 INJECTION INTRAMUSCULAR; INTRAVENOUS at 00:13

## 2024-05-18 RX ADMIN — VIBEGRON 75 MG: 75 TABLET, FILM COATED ORAL at 05:09

## 2024-05-18 RX ADMIN — LISINOPRIL 5 MG: 10 TABLET ORAL at 05:08

## 2024-05-18 ASSESSMENT — PAIN DESCRIPTION - PAIN TYPE
TYPE: ACUTE PAIN
TYPE: ACUTE PAIN

## 2024-05-18 NOTE — DISCHARGE INSTRUCTIONS
Diet    Resume your normal diet as tolerated.  A diet low in cholesterol, fat, and sodium is recommended for good health.     Activity    Resume Your Normal Activity    You may resume your normal activity as tolerated.  Rest as needed.                   Forwarded to Dr. Ramirez.

## 2024-05-18 NOTE — CASE COMMUNICATION
noted  ----- Message -----  From: Olivia Dudley R.N.  Sent: 5/17/2024   9:33 AM PDT  To: Chela Mohamud R.N.; Sneha Medina R.N.; *      Arrived at [facility] with [facility]. [place] caregiver called 911 because [place] is having chest pain. EMS transported Donte to [facility] for further evaluation.

## 2024-05-18 NOTE — PROGRESS NOTES
Pt dc'd back to [facility]. IV removed. Pt left unit via wheelchair with [facility]. Personal belongings with pt when leaving unit. Pt given discharge instructions prior to leaving unit including where to  prescriptions and when to follow-up; copy also sent to facility via fax. Verbalizes understanding. Copy of discharge instructions with pt and in the chart.

## 2024-05-18 NOTE — PROGRESS NOTES
Report received from night shift RN. Patient A&O3, in bed resting comfortably. VSS, denies pain, bed in lowest position and locked, call light in reach.

## 2024-05-18 NOTE — DISCHARGE PLANNING
Case Management Discharge Planning    Admission Date: 5/17/2024  GMLOS:    ALOS: 0    6-Clicks ADL Score:    6-Clicks Mobility Score:    PT and/or OT Eval ordered: No  Post-acute Referrals Ordered: No  Post-acute Choice Obtained: No  Has referral(s) been sent to post-acute provider:  No      Anticipated Discharge Dispo: Discharge Disposition: Discharged to home/self care (01)    DME Needed: No    Action(s) Taken: Updated Provider/Nurse on Discharge Plan and DC Assessment Complete (See below)    0900, Pt was visited at room. Pt oriented to person and time. Pt agreeable to discharge back to [place] at [facility]. RN CM called and spoke to Pt's daughter Reina to obtain assessment information. Reina is in agreement to discharge to [facility] today.    0905, RN CM called and spoke to Lorene from [facility] to inform Pt is for discharge back to their facility today.    0912, RN CM fax approved ACS from [employer] for [facility] transport amounting to  $281.96 to [facility]. Pt has [employer] for insurance.      Escalations Completed: None    Medically Clear: Yes    Next Steps: CM will continue to assist Pt with discharge needs.      Barriers to Discharge: None      Care Transition Team Assessment  RN CM called Pt's daughter Reina to obtain assessment informations. Demographics verified. RN CM introduced self and purpose of visit.   Pt lives in [facility].  Pt has  caregivers and family members for support.  Pt has CATALINA COOPER M.D. for PCP  Pt needs assistance with ADLs prior to hospitalization.        Information Source  Orientation Level: Oriented to person, Oriented to time  Information Given By: Other (Comments) (Daughter)  Informant's Name: Reina      Elopement Risk  Legal Hold: No  Ambulatory or Self Mobile in Wheelchair: Yes  Disoriented: No  Psychiatric Symptoms: None  History of Wandering: No  Elopement this Admit: No  Vocalizing Wanting to Leave:  No  Displays Behaviors, Body Language Wanting to Leave: No-Not at Risk for Elopement  Elopement Risk: At Risk for Elopement    Interdisciplinary Discharge Planning  Primary Care Physician: CATALINA COOPER M.D.  Lives with - Patient's Self Care Capacity: Attendant / Paid Care Giver  Patient or legal guardian wants to designate a caregiver: Yes  Caregiver name: Reina  Caregiver contact info: [phone]  ([facility]) Authorization for Release of Health Information has been completed: Yes  Support Systems: Family Member(s), Children, Other (Comments) (Caregivers at [facility])  Housing / Facility: Assisted Living Residence  Name of Care Facility: [facility]  Durable Medical Equipment: Walker, Other - Specify (walkers and canes)    Discharge Preparedness  What is your plan after discharge?: Home with help  What are your discharge supports?: Child  Prior Functional Level: Needs Assist with Activities of Daily Living, Needs Assist with Medication Management    Functional Assesment  Prior Functional Level: Needs Assist with Activities of Daily Living, Needs Assist with Medication Management    Finances  Financial Barriers to Discharge: No  Prescription Coverage: Yes    Vision / Hearing Impairment  Vision Impairment : Yes  Right Eye Vision: Impaired, Wears Glasses  Left Eye Vision: Impaired, Wears Glasses  Hearing Impairment : No        Domestic Abuse  Have you ever been the victim of abuse or violence?: No  Physical Abuse or Sexual Abuse: No  Verbal Abuse or Emotional Abuse: No  Possible Abuse/Neglect Reported to:: Not Applicable    Psychological Assessment  History of Substance Abuse: None  History of Psychiatric Problems: No         Anticipated Discharge Information  Discharge Disposition: Discharged to home/self care (01)

## 2024-05-18 NOTE — DISCHARGE PLANNING
GABE requested by RN CM to set up transport with [facility]. Pending [employer] form from RN CM.     0908  GABE received [employer] form for transport request.   Called [facility] and spoke with Phoebe. Soonest available, scheduled for [id number].  Reservation #: [id number] Cost: $281.96  RN CM aware.

## 2024-05-18 NOTE — PROGRESS NOTES
Discharge orders received, barrier to discharge is transportation. Will contact patients family and [facility] to coordinate.

## 2024-05-18 NOTE — PROGRESS NOTES
Received report from Mercedez DELGADO. Pt denies pain at this time. Call light in reach. Bed in lowest position. Fall precautions in place. Plan of care discussed with pt. Pt agreeing to plan of care. Communication board updated. All questions answered. Assessment completed.

## 2024-05-18 NOTE — DISCHARGE SUMMARY
Discharge Summary    CHIEF COMPLAINT ON ADMISSION  Chief Complaint   Patient presents with    Chest Pain     Sudden onset while watching TV this AM, lasted approx 10 minutes and self resolved, arrives without chest pain, hx CHF, A-fib, pacemaker        Reason for Admission  ems     Admission Date  5/17/2024    CODE STATUS  DNAR/DNI    HPI & HOSPITAL COURSE  Donte Magaña is a 83 y.o. female who presented 5/17/2024 with pmx afib, , pacemaker ,hypothyroid, dementia who was brought in via ambulance as the patient was apparently watching TV and supposedly patient was having chest pain.  This suppose a chest pain lasted for 10 minutes and resolved on its own.  This was the information that was relayed to the ambulance team.  The patient arrives to [facility] ED she knows that she is in the hospital but she denies any chest pain or shortness of breath.  She does not know why she is in the hospital     Patient referred to me for the care and management       Patient was monitored closely overnight and there was no evidence of any significant arrhythmia.  Patient's troponins were negative.  Patient has no complaints whatsoever    She has clear memory deficits consistent with dementia    Her echocardiogram did not show any acute abnormality    Her BP was low normal and she appears to be close to euvolemia.  She we initially held her Lasix and her atenolol__> her blood pressure slowly creeping back up the following day so we recommended cutting atenolol down from 50 mg to 25 mg p.o. daily and Lasix from 40 mg to 20 mg p.o. daily    She needs close outpatient follow up    Therefore, she is discharged in good and stable condition to home with close outpatient follow-up.    The patient recovered much more quickly than anticipated on admission.    Discharge Date  5/18    FOLLOW UP ITEMS POST DISCHARGE      DISCHARGE DIAGNOSES  Principal Problem (Resolved):    Elevated troponin (POA: Yes)  Active Problems:    Acquired  hypothyroidism (POA: Yes)    Primary hypertension (POA: Yes)      Overview: Chronic condition, currently on atenolol 50 mg daily and lisinopril 5 mg       daily    Paroxysmal atrial fibrillation (HCC) (POA: Yes)      Overview: Paced rhythm            Continue Xarelto            Monitor on telemetry    Moderate Alzheimer's dementia without behavioral disturbance, psychotic disturbance, mood disturbance, or anxiety (HCC) (POA: Yes)      FOLLOW UP  Future Appointments   Date Time Provider Department Center   5/21/2024  1:00 PM Jolie Escobar M.D. [facility]   7/15/2024  4:20 PM Jolie Escobar M.D. [facility]     No follow-up provider specified.    MEDICATIONS ON DISCHARGE     Medication List        CHANGE how you take these medications        Instructions   atenolol 25 MG Tabs  What changed:   medication strength  how much to take  when to take this  Commonly known as: Tenormin   Take 1 Tablet by mouth every day. Indications: High Blood Pressure Disorder  Dose: 25 mg     furosemide 20 MG Tabs  What changed:   medication strength  how much to take  when to take this  Commonly known as: Lasix   Take 1 Tablet by mouth every day.  Dose: 20 mg            CONTINUE taking these medications        Instructions   acetaminophen 500 MG Tabs  Commonly known as: Tylenol   Take 1,000 mg by mouth 3 times a day as needed for Mild Pain. 2 tablets = 1,000 mg.  Dose: 1,000 mg     fluticasone-umeclidinium-vilanterol 100-62.5-25 mcg/act inhaler  Commonly known as: Trelegy Ellipta   Inhale 1 Puff every day.  Dose: 1 Puff     levothyroxine 50 MCG Tabs  Commonly known as: Synthroid   Take 50 mcg by mouth every morning on an empty stomach.  Dose: 50 mcg     lisinopril 5 MG Tabs  Commonly known as: Prinivil   Take 5 mg by mouth every day. Indications: High Blood Pressure Disorder  Dose: 5 mg     magnesium oxide 400 MG Tabs tablet  Commonly known as: Mag-Ox   Take 400 mg by mouth every day.  Dose: 400 mg     Myrbetriq 50 MG  "Tb24  Generic drug: Mirabegron ER   Take 50 mg by mouth every day. Indications: Overactive Bladder  Dose: 50 mg     sertraline 50 MG Tabs  Commonly known as: Zoloft   Take 1 Tablet by mouth every day. Indications: Major Depressive Disorder  Dose: 50 mg     Xarelto 20 MG Tabs tablet  Generic drug: rivaroxaban   Take 20 mg by mouth with dinner.  Dose: 20 mg              Allergies  Allergies   Allergen Reactions    Amiodarone Hives     Throat and tongue itching    Bactrim Shortness of Breath    Cipro Xr Swelling    Metoprolol Swelling     Causes throat swelling    Morphine Unspecified     Hallucinations    Phytoplex Z-Guard [Petrolatum-Zinc Oxide] Unspecified     \"causes burning\"    Pseudoephedrine Palpitations    Qvar [Beclomethasone Dipropionate] Unspecified     Pressure on heart      Vibramycin Shortness of Breath    Atorvastatin Calcium-Polysorbate 80 Unspecified     Muscle aches      Augmentin Unspecified     Unknown reaction    Diltiazem Rash     rash    Flecainide Unspecified     dizziness    Keflex Unspecified     Pt states \"Unsure\".    Mucinex Unspecified     GI Distress      Tramadol Unspecified     crying    Atorvastatin Myalgia    Tape Rash     Paper tape okay       DIET  Orders Placed This Encounter   Procedures    Diet Order Diet: Cardiac; Miscellaneous modifications: (optional): No Decaf, No Caffeine(for test)     Standing Status:   Standing     Number of Occurrences:   1     Order Specific Question:   Diet:     Answer:   Cardiac [6]     Order Specific Question:   Miscellaneous modifications: (optional)     Answer:   No Decaf, No Caffeine(for test) [11]       ACTIVITY  As tolerated.  Weight bearing as tolerated    CONSULTATIONS      PROCEDURES   Show images for EC-ECHOCARDIOGRAM COMPLETE W/O CONT  EC-ECHOCARDIOGRAM COMPLETE W/O CONT  Order: [id number]   Status: Final result       Visible to patient: Yes (not seen)       Next appt: 05/21/2024 at 01:00 PM [facility] (Jolie Escobar M.D.)    0 " Result Notes  Details    Reading Physician Reading Date Result Priority   No Reading Provider Prelim 2024    Kishore Moncada M.D.  [phone] 2024      Narrative & Impression  Transthoracic  Echo Report        Echocardiography Laboratory     CONCLUSIONS  Compared to the images of the prior study on 24 - there is no   significant change.  Normal left ventricular systolic function.  The left ventricular ejection fraction is visually estimated to be 60%.  The right ventricle is normal in size and systolic function.  Moderate aortic insufficiency.     STEPHANIE DAVENPORT  Exam Date:         2024                      15:19  Exam Location:     Inpatient  Priority:          Routine     Ordering Physician:        PARAM SCOTT  Referring Physician:       [id number] Neal  Sonographer:               Krystina SHAFFER     Age:    83     Gender:    F  MRN:    [id number]  :    [id number]  BSA:    1.77   Ht (in):    62     Wt (lb):    168  Exam Type:     Complete     Indications:     Chest Pain  ICD Codes:       786.5     CPT Codes:       64924     BP:   135    /   90     HR:   80  Technical Quality:       Fair     MEASUREMENTS  (Male / Female) Normal Values  2D ECHO  LV Diastolic Diameter PLAX        3.3 cm                4.2 - 5.9 / 3.9 - 5.3   cm  LV Systolic Diameter PLAX         2.3 cm                2.1 - 4.0 cm  IVS Diastolic Thickness           1.1 cm                  LVPW Diastolic Thickness          1.1 cm                  LVOT Diameter                     2.1 cm                  Estimated LV Ejection Fraction    60 %                    LV Ejection Fraction MOD BP       53.6 %                >= 55  %  LV Ejection Fraction MOD 4C       52.4 %                  LV Ejection Fraction MOD 2C       51.7 %                  IVC Diameter                      1.3 cm                     DOPPLER  AV Peak Velocity                  1.2 m/s                 AV Peak Gradient                  5.4 mmHg                 AV Mean Gradient                  2.8 mmHg                AI Pressure Half Time             434 ms                  LVOT Peak Velocity                0.75 m/s                AV Area Cont Eq vti               2.7 cm2                 TR Peak Velocity                  273 cm/s                PV Peak Velocity                  0.62 m/s                PV Peak Gradient                  1.5 mmHg                RVOT Peak Velocity                0.51 m/s                   * Indicates values subject to auto-interpretation  LV EF:  60    %     FINDINGS  Left Ventricle  The left ventricle is normal in size. Mild concentric left ventricular   hypertrophy. Normal left ventricular systolic function. The left   ventricular ejection fraction is visually estimated to be 60%. Normal   regional wall motion. Diastolic function is not fully assessed.     Right Ventricle  The right ventricle is normal in size and systolic function.  Pacer/ICD   wire seen in the right ventricle.     Right Atrium  The right atrium is normal in size. Normal inferior vena cava size and   inspiratory collapse.  Catheter/pacemaker wire present in the right   atrial cavity.     Left Atrium  Moderately dilated left atrium. Left atrial volume index is 46 mL/sq m.     Mitral Valve  Structurally normal mitral valve without significant stenosis. Trace   mitral regurgitation.     Aortic Valve  Structurally normal aortic valve without significant stenosis or   regurgitation.  Mild aortic annular and leaflet calcification. Moderate   aortic insufficiency. Pressure half time is 400 msec.     Tricuspid Valve  Structurally normal tricuspid valve without significant stenosis .Mild   tricuspid regurgitation. Right atrial pressure is estimated to be 3   mmHg. Estimated right ventricular systolic pressure is 35 mmHg.     Pulmonic Valve  Structurally normal pulmonic valve without significant stenosis .trace   pulmonic insufficiency.     Pericardium  No pericardial effusion.      Aorta  Normal aortic root for body surface area. The ascending aorta diameter   is 3.1 cm.                                Kishore Moncada MD  (Electronically Signed)  Final Date:     17 May 2024 17:44         Component 1 d ago   Eject.Frac. MOD BP 53.64   Eject.Frac. MOD 4C 52.36   Eject.Frac. MOD 2C 51.68   Left Ventrical Ejection Fraction 60   Resulting Agency [facility]              Specimen Collected: 05/17/24  3:19 PM          LABORATORY  Lab Results   Component Value Date    SODIUM 137 05/17/2024    POTASSIUM 4.1 05/17/2024    CHLORIDE 98 05/17/2024    CO2 24 05/17/2024    GLUCOSE 150 (H) 05/17/2024    BUN 41 (H) 05/17/2024    CREATININE 1.37 05/17/2024    CREATININE 0.78 07/29/2010    GLOMRATE >59 07/29/2010        Lab Results   Component Value Date    WBC 9.3 05/17/2024    WBC 9.3 07/29/2010    HEMOGLOBIN 14.2 05/17/2024    HEMATOCRIT 44.0 05/17/2024    PLATELETCT 258 05/17/2024        Total time of the discharge process exceeds 39  minutes.

## 2024-05-18 NOTE — PROGRESS NOTES
GMT here to transport patient, all belongings with patient, IV removed, face sheet and discharge meds given to [facility] staff. [place] medical coordinator aware of medications returning with patient.

## 2024-05-18 NOTE — PROGRESS NOTES
Monitor Summary:  Rhythm: Paced  Rate: 80-93  Ectopy: PVC couplet    0.26/0.11/0.42    Per monitor room interpretation.

## 2024-05-18 NOTE — CARE PLAN
The patient is Stable - Low risk of patient condition declining or worsening    Shift Goals  Clinical Goals: Tele monitor, safety  Patient Goals: rest  Family Goals: not at the bedsude    Progress made toward(s) clinical / shift goals:    Problem: Knowledge Deficit - Standard  Goal: Patient and family/care givers will demonstrate understanding of plan of care, disease process/condition, diagnostic tests and medications  Description: Target End Date:  5/18/2024    1.  Patient oriented to unit, equipment, visitation policy and means for communicating concern  2.  Complete/review Learning Assessment  3.  Assess knowledge level of disease process/condition, treatment plan, diagnostic tests and medications  4.  Explain disease process/condition, treatment plan, diagnostic tests and medications  Outcome: Progressing     Problem: Fall Risk  Goal: Patient will remain free from falls  Description: Target End Date:  5/18/2024    1.  Assess for fall risk factors  2.  Implement fall precautions  Outcome: Progressing

## 2024-05-19 ENCOUNTER — HOSPITAL ENCOUNTER (EMERGENCY)
Facility: MEDICAL CENTER | Age: 84
End: 2024-05-20
Attending: EMERGENCY MEDICINE
Payer: MEDICARE

## 2024-05-19 ENCOUNTER — APPOINTMENT (OUTPATIENT)
Dept: RADIOLOGY | Facility: MEDICAL CENTER | Age: 84
End: 2024-05-19
Attending: EMERGENCY MEDICINE
Payer: MEDICARE

## 2024-05-19 DIAGNOSIS — M25.552 LEFT HIP PAIN: ICD-10-CM

## 2024-05-19 DIAGNOSIS — R07.9 ACUTE CHEST PAIN: ICD-10-CM

## 2024-05-19 DIAGNOSIS — J47.1 BRONCHIECTASIS WITH ACUTE EXACERBATION (HCC): ICD-10-CM

## 2024-05-19 DIAGNOSIS — J22 ACUTE LOWER RESPIRATORY INFECTION: ICD-10-CM

## 2024-05-19 LAB
BASOPHILS # BLD AUTO: 0.5 % (ref 0–1.8)
BASOPHILS # BLD: 0.05 K/UL (ref 0–0.12)
EKG IMPRESSION: NORMAL
EKG IMPRESSION: NORMAL
EOSINOPHIL # BLD AUTO: 0.49 K/UL (ref 0–0.51)
EOSINOPHIL NFR BLD: 4.7 % (ref 0–6.9)
ERYTHROCYTE [DISTWIDTH] IN BLOOD BY AUTOMATED COUNT: 49.3 FL (ref 35.9–50)
HCT VFR BLD AUTO: 43.2 % (ref 37–47)
HGB BLD-MCNC: 13.9 G/DL (ref 12–16)
IMM GRANULOCYTES # BLD AUTO: 0.05 K/UL (ref 0–0.11)
IMM GRANULOCYTES NFR BLD AUTO: 0.5 % (ref 0–0.9)
LYMPHOCYTES # BLD AUTO: 1.76 K/UL (ref 1–4.8)
LYMPHOCYTES NFR BLD: 16.9 % (ref 22–41)
MCH RBC QN AUTO: 31.8 PG (ref 27–33)
MCHC RBC AUTO-ENTMCNC: 32.2 G/DL (ref 32.2–35.5)
MCV RBC AUTO: 98.9 FL (ref 81.4–97.8)
MONOCYTES # BLD AUTO: 0.71 K/UL (ref 0–0.85)
MONOCYTES NFR BLD AUTO: 6.8 % (ref 0–13.4)
NEUTROPHILS # BLD AUTO: 7.36 K/UL (ref 1.82–7.42)
NEUTROPHILS NFR BLD: 70.6 % (ref 44–72)
NRBC # BLD AUTO: 0 K/UL
NRBC BLD-RTO: 0 /100 WBC (ref 0–0.2)
PLATELET # BLD AUTO: 262 K/UL (ref 164–446)
PMV BLD AUTO: 10 FL (ref 9–12.9)
RBC # BLD AUTO: 4.37 M/UL (ref 4.2–5.4)
WBC # BLD AUTO: 10.4 K/UL (ref 4.8–10.8)

## 2024-05-19 PROCEDURE — A9270 NON-COVERED ITEM OR SERVICE: HCPCS | Performed by: EMERGENCY MEDICINE

## 2024-05-19 PROCEDURE — 99285 EMERGENCY DEPT VISIT HI MDM: CPT

## 2024-05-19 PROCEDURE — 700102 HCHG RX REV CODE 250 W/ 637 OVERRIDE(OP): Performed by: EMERGENCY MEDICINE

## 2024-05-19 PROCEDURE — 83880 ASSAY OF NATRIURETIC PEPTIDE: CPT

## 2024-05-19 PROCEDURE — 83690 ASSAY OF LIPASE: CPT

## 2024-05-19 PROCEDURE — 93005 ELECTROCARDIOGRAM TRACING: CPT | Performed by: EMERGENCY MEDICINE

## 2024-05-19 PROCEDURE — 80053 COMPREHEN METABOLIC PANEL: CPT

## 2024-05-19 PROCEDURE — 36415 COLL VENOUS BLD VENIPUNCTURE: CPT

## 2024-05-19 PROCEDURE — 71045 X-RAY EXAM CHEST 1 VIEW: CPT

## 2024-05-19 PROCEDURE — 85025 COMPLETE CBC W/AUTO DIFF WBC: CPT

## 2024-05-19 RX ORDER — ACETAMINOPHEN 500 MG
500 TABLET ORAL ONCE
Status: COMPLETED | OUTPATIENT
Start: 2024-05-19 | End: 2024-05-19

## 2024-05-19 RX ORDER — AZITHROMYCIN 250 MG/1
500 TABLET, FILM COATED ORAL ONCE
Status: COMPLETED | OUTPATIENT
Start: 2024-05-19 | End: 2024-05-19

## 2024-05-19 RX ADMIN — ACETAMINOPHEN 500 MG: 500 TABLET, FILM COATED ORAL at 21:51

## 2024-05-19 RX ADMIN — AZITHROMYCIN DIHYDRATE 500 MG: 250 TABLET, FILM COATED ORAL at 21:51

## 2024-05-19 ASSESSMENT — PAIN DESCRIPTION - PAIN TYPE
TYPE: ACUTE PAIN

## 2024-05-19 ASSESSMENT — FIBROSIS 4 INDEX: FIB4 SCORE: 1.92

## 2024-05-20 ENCOUNTER — PATIENT OUTREACH (OUTPATIENT)
Dept: MEDICAL GROUP | Facility: MEDICAL CENTER | Age: 84
End: 2024-05-20
Payer: MEDICARE

## 2024-05-20 VITALS
WEIGHT: 170 LBS | OXYGEN SATURATION: 98 % | HEART RATE: 82 BPM | TEMPERATURE: 98.6 F | RESPIRATION RATE: 16 BRPM | DIASTOLIC BLOOD PRESSURE: 59 MMHG | SYSTOLIC BLOOD PRESSURE: 117 MMHG | HEIGHT: 62 IN | BODY MASS INDEX: 31.28 KG/M2

## 2024-05-20 LAB
ALBUMIN SERPL BCP-MCNC: 3.4 G/DL (ref 3.2–4.9)
ALBUMIN/GLOB SERPL: 1.1 G/DL
ALP SERPL-CCNC: 90 U/L (ref 30–99)
ALT SERPL-CCNC: 12 U/L (ref 2–50)
ANION GAP SERPL CALC-SCNC: 13 MMOL/L (ref 7–16)
AST SERPL-CCNC: 31 U/L (ref 12–45)
BILIRUB SERPL-MCNC: 0.5 MG/DL (ref 0.1–1.5)
BUN SERPL-MCNC: 27 MG/DL (ref 8–22)
CALCIUM ALBUM COR SERPL-MCNC: 9.8 MG/DL (ref 8.5–10.5)
CALCIUM SERPL-MCNC: 9.3 MG/DL (ref 8.5–10.5)
CHLORIDE SERPL-SCNC: 103 MMOL/L (ref 96–112)
CO2 SERPL-SCNC: 22 MMOL/L (ref 20–33)
CREAT SERPL-MCNC: 0.98 MG/DL (ref 0.5–1.4)
GFR SERPLBLD CREATININE-BSD FMLA CKD-EPI: 56 ML/MIN/1.73 M 2
GLOBULIN SER CALC-MCNC: 3.1 G/DL (ref 1.9–3.5)
GLUCOSE SERPL-MCNC: 140 MG/DL (ref 65–99)
LIPASE SERPL-CCNC: 42 U/L (ref 11–82)
NT-PROBNP SERPL IA-MCNC: 1110 PG/ML (ref 0–125)
NT-PROBNP SERPL IA-MCNC: 1186 PG/ML (ref 0–125)
POTASSIUM SERPL-SCNC: 5.3 MMOL/L (ref 3.6–5.5)
PROT SERPL-MCNC: 6.5 G/DL (ref 6–8.2)
SODIUM SERPL-SCNC: 138 MMOL/L (ref 135–145)
TROPONIN T SERPL-MCNC: 18 NG/L (ref 6–19)

## 2024-05-20 PROCEDURE — 84484 ASSAY OF TROPONIN QUANT: CPT

## 2024-05-20 PROCEDURE — 83880 ASSAY OF NATRIURETIC PEPTIDE: CPT

## 2024-05-20 RX ORDER — AZITHROMYCIN 250 MG/1
TABLET, FILM COATED ORAL
Qty: 6 TABLET | Refills: 0 | Status: ACTIVE | OUTPATIENT
Start: 2024-05-20 | End: 2024-05-28

## 2024-05-20 NOTE — ED NOTES
Pt given d/c instructions, f/u info and RX x 2 with verbal understanding.  PIV d/c'd with tip intact.  Awaiting [facility] transport, ETA 0219.

## 2024-05-20 NOTE — DISCHARGE PLANNING
Medical Social Work    MSW received a voalte message from bedside RN that pt is ready to return to her Assisted Living Facility: [facility].  Pt's daughter is aware as is the AL per RN.  MSW faxed PCS and facesheet to [facility] and made follow up phone call to [facility] to arrange transport for 0215.  Bedside RN made aware of transport time.

## 2024-05-20 NOTE — ED NOTES
Break RN: noted desaturation while pt is sleepin% on room air  Applied oxygen inhalation at 3 LPM via nasal cannula

## 2024-05-20 NOTE — ED NOTES
Med rec updated and complete. Allergies reviewed. Per [facility]KEL from [facility]   Last dose of rivaroxban  05/19/24.  No antibiotics noted.    Home pharmacy  [facility] = [phone]    Pt recently discharged from [facility]  Some discharge medications were filled at   [facility].

## 2024-05-20 NOTE — ED NOTES
Original labs recollected earlier. Lab reports hemolysis in recollect. Phlebotomist contacted for lab recollect

## 2024-05-20 NOTE — ED TRIAGE NOTES
Chief Complaint   Patient presents with    Chest Pain     Pt reports central chest discomfort described as pressure.     Flank Pain     Pt BIB EMS, from [place] [facility], for above complaints. Pt has been nauseated for about 2 hours now accompanied by pain. Pt denies any falls today. Pt wears oxygen as needed at home, EMS reports pt to be 88% on RA. Pt arrives 93% on RA at this time. Pt received 4 zofran IV prior to arrival by EMS.

## 2024-05-20 NOTE — ED NOTES
Bedside report received DANNY Neri assumed care of patient.    Fall risk interventions in place: Move the patient closer to the nurse's station, Patient's personal possessions are with in their safe reach, Place socks on patient, Keep floor surfaces clean and dry, and Accompanied to restroom (all applicable per [place] Fall risk assessment)   Continuous monitoring: Cardiac Leads, Pulse Ox, or Blood Pressure  IVF/IV medications: Not Applicable   Oxygen: Room Air  Bedside sitter: Not Applicable   Isolation: Not Applicable

## 2024-05-20 NOTE — ED PROVIDER NOTES
ED Provider Note    CHIEF COMPLAINT  Chief Complaint   Patient presents with    Chest Pain     Pt reports central chest discomfort described as pressure.     Flank Pain       EXTERNAL RECORDS REVIEWED  Inpatient Notes reviewed discharge summary dated May 18, 2024 by Dr. Cortes.  Patient admitted May 17 for episode of chest pain.  Reassuring troponins, patient's medications adjusted, atenolol decreased from 50-25 p.o. daily, Lasix decreased from 40 to 20 mg p.o. daily.  Additionally reviewed echocardiogram dated May 17, 2024.  Reassuring study, normal left ventricular systolic function compared to previous study April 8, 2024.    HPI/ROS  LIMITATION TO HISTORY   Select: History of dementia  OUTSIDE HISTORIAN(S):  EMS chest pain acutely today    Donte Magaña is a 83 y.o. female who presents for evaluation of acute chest pain.  Patient does have history of dementia, history is somewhat limited as such.  Does have history of hypertension and has atrial fibrillation with pacemaker placement.  Pain described as heavy, isolated to left chest without radiation elsewhere.  Denies any arm or back or jaw pain.  Patient denies any dyspnea, no nausea, no vomiting, no diaphoresis.  She does have swollen legs but notes that he is typical for her.  No fever.  Does endorse cough with production of yellow sputum.  Family notes she is no longer on her fluticasone inhaler which had helped before.    PAST MEDICAL HISTORY   has a past medical history of A-fib ([facility]), Anesthesia, Anticoagulant long-term use (1/12/2012), Arthritis, Atrial fibrillation ([facility]), Backpain, Bowel habit changes, Breath shortness, Bronchitis (Nov, 2013), CAD (coronary artery disease), Depression, Glaucoma (5/3/2011), Hematoma complicating a procedure (11/3/2012), Hemorrhagic disorder ([facility]), Hypertension, Hypothyroid, Lupus ([facility]), Macular degeneration, Menopause (1/12/2012), Mitral regurgitation (10/30/2012), Obesity (1/12/2012), Pacemaker (2018), Pneumonia  (feb,2013), Pre-syncope (6/29/2018), Pulmonary hypertension (HCC) (10/30/2012), PVC's (premature ventricular contractions) (1/12/2012), Senile nuclear sclerosis, Spinal stenosis of lumbar region at multiple levels, Unspecified cataract, Unspecified urinary incontinence, and Urinary bladder disorder.    SURGICAL HISTORY   has a past surgical history that includes tonsillectomy and adenoidectomy; recovery (11/30/2010); colonoscopy (2008); abdominal hysterectomy total (April 15,1975); other; cataract phaco with iol (4/21/2015); cataract phaco with iol (Right, 5/5/2015); pacemaker insertion (06/30/2018); open reduction; other cardiac surgery (12/2017 and 07/19/2018 ); hip arth anterior total (Right, 1/17/2019); irrigation & debridement ortho (Right, 2/14/2019); combined ant/post colporrhaphy (1/14/2020); lap,diagnostic abdomen (1/14/2020); enterocele repair (1/14/2020); bladder sling female (1/14/2020); vaginal suspension (1/14/2020); salpingo oophorectomy (Bilateral, 1/14/2020); knee arthroplasty total (Left, 5/28/2015); and knee arthroplasty total (Right, 6/23/2016).    FAMILY HISTORY  Family History   Problem Relation Age of Onset    Stroke Mother     Diabetes Father     Stroke Sister     Heart Disease Brother     Stroke Sister     GI Disease Daughter         Crohn's Disease    Heart Disease Daughter         CHF    Other Daughter         Chronic Pain--Lymphedema    Cancer Paternal Aunt         breast       SOCIAL HISTORY  Social History     Tobacco Use    Smoking status: Never    Smokeless tobacco: Never   Vaping Use    Vaping status: Never Used   Substance and Sexual Activity    Alcohol use: No    Drug use: No    Sexual activity: Not Currently     Partners: Male     Birth control/protection: Post-Menopausal       CURRENT MEDICATIONS  Home Medications       Reviewed by Maggie Lopez (Pharmacy Tech) on 05/19/24 at 2222  Med List Status: Complete     Medication Last Dose Status   acetaminophen (TYLENOL) 500 MG Tab  "5/19/2024 Active   atenolol (TENORMIN) 25 MG Tab 5/18/2024 Active   fluticasone-umeclidinium-vilanterol (TRELEGY ELLIPTA) 100-62.5-25 mcg/act inhaler 5/19/2024 Active   furosemide (LASIX) 20 MG Tab 5/19/2024 Active   levothyroxine (SYNTHROID) 50 MCG Tab 5/19/2024 Active   lisinopril (PRINIVIL) 5 MG Tab 5/19/2024 Active   magnesium oxide (MAG-OX) 400 MG Tab tablet 5/19/2024 Active   Mirabegron ER (MYRBETRIQ) 50 MG TABLET SR 24 HR 5/19/2024 Active   rivaroxaban (XARELTO) 20 MG Tab tablet 5/19/2024 Active   sertraline (ZOLOFT) 50 MG Tab 5/18/2024 Active                  Audit from Redirected Encounters    **Home medications have not yet been reviewed for this encounter**         ALLERGIES  Allergies   Allergen Reactions    Amiodarone Hives     Throat and tongue itching    Bactrim Shortness of Breath    Cipro Xr Swelling    Metoprolol Swelling     Causes throat swelling    Morphine Unspecified     Hallucinations    Phytoplex Z-Guard [Petrolatum-Zinc Oxide] Unspecified     \"causes burning\"    Pseudoephedrine Palpitations    Qvar [Beclomethasone Dipropionate] Unspecified     Pressure on heart      Vibramycin Shortness of Breath    Atorvastatin Calcium-Polysorbate 80 Unspecified     Muscle aches      Augmentin Unspecified     Unknown reaction    Diltiazem Rash     rash    Flecainide Unspecified     dizziness    Keflex Unspecified     Pt states \"Unsure\".    Mucinex Unspecified     GI Distress      Tramadol Unspecified     crying    Atorvastatin Myalgia    Tape Rash     Paper tape okay       PHYSICAL EXAM  VITAL SIGNS: BP 92/54   Pulse 80   Temp 36.6 °C (97.8 °F) (Temporal)   Resp 15   Ht 1.575 m (5' 2\")   Wt 77.1 kg (170 lb)   LMP 01/10/1975   SpO2 97%   BMI 31.09 kg/m²    General: Alert, no acute distress  Skin: Warm, dry, normal for ethnicity  Head: Normocephalic, atraumatic  Neck: Trachea midline, no tenderness  Eye: PERRL, normal conjunctiva  ENMT: Oral mucosa moist  Cardiovascular: Regular rate and rhythm, No " murmur, Normal peripheral perfusion.  Reproducible tenderness to left chest, no step-off, no crepitus.  Respiratory: Lungs CTA, respirations are non-labored, breath sounds are equal  Musculoskeletal:  2+ pitting edema symmetrical bilaterally with regard to the lower extremities.  Neurological: Alert and appropriate.  Psychiatric: Cooperative, appropriate mood & affect     EKG/LABS  Results for orders placed or performed during the hospital encounter of 05/19/24   CBC with Differential   Result Value Ref Range    WBC 10.4 4.8 - 10.8 K/uL    RBC 4.37 4.20 - 5.40 M/uL    Hemoglobin 13.9 12.0 - 16.0 g/dL    Hematocrit 43.2 37.0 - 47.0 %    MCV 98.9 (H) 81.4 - 97.8 fL    MCH 31.8 27.0 - 33.0 pg    MCHC 32.2 32.2 - 35.5 g/dL    RDW 49.3 35.9 - 50.0 fL    Platelet Count 262 164 - 446 K/uL    MPV 10.0 9.0 - 12.9 fL    Neutrophils-Polys 70.60 44.00 - 72.00 %    Lymphocytes 16.90 (L) 22.00 - 41.00 %    Monocytes 6.80 0.00 - 13.40 %    Eosinophils 4.70 0.00 - 6.90 %    Basophils 0.50 0.00 - 1.80 %    Immature Granulocytes 0.50 0.00 - 0.90 %    Nucleated RBC 0.00 0.00 - 0.20 /100 WBC    Neutrophils (Absolute) 7.36 1.82 - 7.42 K/uL    Lymphs (Absolute) 1.76 1.00 - 4.80 K/uL    Monos (Absolute) 0.71 0.00 - 0.85 K/uL    Eos (Absolute) 0.49 0.00 - 0.51 K/uL    Baso (Absolute) 0.05 0.00 - 0.12 K/uL    Immature Granulocytes (abs) 0.05 0.00 - 0.11 K/uL    NRBC (Absolute) 0.00 K/uL   Comp Metabolic Panel   Result Value Ref Range    Sodium 138 135 - 145 mmol/L    Potassium 5.3 3.6 - 5.5 mmol/L    Chloride 103 96 - 112 mmol/L    Co2 22 20 - 33 mmol/L    Anion Gap 13.0 7.0 - 16.0    Glucose 140 (H) 65 - 99 mg/dL    Bun 27 (H) 8 - 22 mg/dL    Creatinine 0.98 0.50 - 1.40 mg/dL    Calcium 9.3 8.5 - 10.5 mg/dL    Correct Calcium 9.8 8.5 - 10.5 mg/dL    AST(SGOT) 31 12 - 45 U/L    ALT(SGPT) 12 2 - 50 U/L    Alkaline Phosphatase 90 30 - 99 U/L    Total Bilirubin 0.5 0.1 - 1.5 mg/dL    Albumin 3.4 3.2 - 4.9 g/dL    Total Protein 6.5 6.0 - 8.2  g/dL    Globulin 3.1 1.9 - 3.5 g/dL    A-G Ratio 1.1 g/dL   LIPASE   Result Value Ref Range    Lipase 42 11 - 82 U/L   TROPONIN   Result Value Ref Range    Troponin T 18 6 - 19 ng/L   proBrain Natriuretic Peptide, NT   Result Value Ref Range    NT-proBNP 1186 (H) 0 - 125 pg/mL   proBrain Natriuretic Peptide, NT   Result Value Ref Range    NT-proBNP 1110 (H) 0 - 125 pg/mL   ESTIMATED GFR   Result Value Ref Range    GFR (CKD-EPI) 56 (A) >60 mL/min/1.73 m 2   EKG   Result Value Ref Range    Report       [facility]    Test Date:  2024  Pt Name:    STEPHANIE DAVENPORT                 Department: ER  MRN:        [id number]                      Room:        01  Gender:     Female                       Technician: [id number]  :        [id number]                   Requested By:ER TRIAGE PROTOCOL  Order #:    [id number]                    Reading MD:    Measurements  Intervals                                Axis  Rate:       80                           P:          87  LA:         213                          QRS:        -67  QRSD:       140                          T:          86  QT:         428  QTc:        494    Interpretive Statements  Atrial-ventricular dual-paced rhythm  No further analysis attempted due to paced rhythm  Baseline wander in lead(s) V1,V2,V3  Compared to ECG 2024 09:53:34  No significant changes       *Note: Due to a large number of results and/or encounters for the requested time period, some results have not been displayed. A complete set of results can be found in Results Review.      I have independently interpreted this EKG    RADIOLOGY/PROCEDURES   I have independently interpreted the diagnostic imaging associated with this visit and am waiting the final reading from the radiologist.   My preliminary interpretation is as follows: Possible infiltrate left lower lobe    Radiologist interpretation:  DX-CHEST-PORTABLE (1 VIEW)   Final Result      Mild left basilar  opacity which could be atelectasis versus minimal infiltrate.          COURSE & MEDICAL DECISION MAKING    ASSESSMENT, COURSE AND PLAN  Care Narrative: Very pleasant 89-year-old female with history of coronary artery disease presents for evaluation of transient left-sided chest discomfort.  Thankfully on my reassessment she is chest pain-free and otherwise is well in appearance.  Pain is reproducible and given thorough cardiac workup earlier this month with reassuring echocardiogram only 3 days ago and normal cardiac enzymes at that time and reassuring presentation acutely this is not consistent with acute coronary syndrome/MI.  She does have chronic peripheral edema and BNP is elevated but thankfully there is no evidence of dyspnea nor volume overload.  She does have productive cough and looks to be an early infiltrate on the left lower lobe, pain likely is secondary to lower respiratory infection as such.  Have initiated antibiotics here in the ED, will write for the same for home.    Chest Pain:  Heart Score 3, low risk stratification, doubt ACS  ED OBS: Yes; I am placing the patient in to an observation status due to a diagnostic uncertainty as well as therapeutic intensity. Patient placed in observation status at 7:10 PM, 5/19/2024.     Observation plan is as follows: Patient declines analgesia initially.  Have ordered cardiac workup including serial cardiac enzymes and chest x-ray.  Differential diagnosis at this point includes was not restricted to acute coronary syndrome, myocardial infarction, pleurisy, chest wall pain, costochondritis    2059: Patient reassessed, resting comfortably and chest pain-free.  Still awaiting labs, I spoke with patient's nurse who notes labs had to be redrawn.    2144: I have ordered azithromycin 500 mg p.o. for the patient's abnormal chest x-ray.  No chest pain but is having discomfort to left hip, for this have ordered acetaminophen 5 mg p.o.  Updated family with results thus  "far, labs at bedside redrawing cardiac workup    2312: Labs have not been sent for the fourth time, told a blood work was hemolyzed.    0049: Troponin thankfully well within normal limits, her BNP is elevated but this is approximates previous baseline.    Upon Reevaluation, the patient's condition has: Improved; and will be discharged.    Patient discharged from ED Observation status at 0054 (Time) 5/20/24 (Date).          Patient Vitals for the past 24 hrs:   BP Temp Temp src Pulse Resp SpO2 Height Weight   05/20/24 0000 92/54 -- -- 80 15 97 % -- --   05/19/24 2343 -- -- -- 80 15 98 % -- --   05/19/24 2338 -- -- -- 80 15 (!) 86 % -- --   05/19/24 2300 95/50 -- -- 80 16 90 % -- --   05/19/24 2200 126/68 -- -- 80 16 90 % -- --   05/19/24 2150 138/64 -- -- 82 19 92 % -- --   05/19/24 2100 138/64 -- -- 81 14 92 % -- --   05/19/24 2000 (!) 140/66 -- -- 80 15 89 % -- --   05/19/24 1904 -- 36.6 °C (97.8 °F) Temporal 80 15 -- -- --   05/19/24 1903 (!) 150/75 -- -- 82 -- 93 % -- --   05/19/24 1902 -- -- -- -- -- -- 1.575 m (5' 2\") 77.1 kg (170 lb)        ADDITIONAL PROBLEMS MANAGED  Chest discomfort, cough    DISPOSITION AND DISCUSSIONS  I have discussed management of the patient with the following physicians and DANNY's:  NA    Discussion of management with other [facility] or appropriate source(s): None     Escalation of care considered, and ultimately not performed:acute inpatient care management, however at this time, the patient is most appropriate for outpatient management    Barriers to care at this time, including but not limited to: Patient lacks transportation .     Decision tools and prescription drugs considered including, but not limited to: Antibiotics azithromycin initiated for lower respiratory infection .    The patient will return for new or worsening symptoms and is stable at the time of discharge.    Patient has had high blood pressure while in the emergency department, felt likely secondary to medical condition. " Counseled patient to monitor blood pressure at home and follow up with primary care physician.      DISPOSITION:  Patient will be discharged home in stable condition.    FOLLOW UP:  Jolie Escobar M.D.  [address] [facility] [address]  [facility] [id number] [phone]    Schedule an appointment as soon as possible for a visit         OUTPATIENT MEDICATIONS:  New Prescriptions    AZITHROMYCIN (ZITHROMAX) 250 MG TAB    Take 2 tablets (500 mg) PO on day 1 and then take 1 tablet (250 mg) daily on 2-5          FINAL DIAGNOSIS  1. Acute lower respiratory infection    2. Acute chest pain    3. Left hip pain           Electronically signed by: Michelle Gilliam M.D., 5/19/2024 7:08 PM

## 2024-05-20 NOTE — ED NOTES
PIV established using ultrasound guidance. Blood specimen obtained for the fourth time and sent to lab.

## 2024-05-20 NOTE — ED NOTES
Report to [facility]. Pt taken to [facility] with all belongings in possession at discharge. VSS at discharge. Pt taken to [facility] by  [facility].

## 2024-05-20 NOTE — ED NOTES
Pt assisted to bedside commode and then back into bed by this RN. Pt declines needs at this time. Call light within reach. Awaiting [facility] transport.

## 2024-05-20 NOTE — ED NOTES
Lab reports specimen obtained by phlebotomist is hemolyzed. RN voices concerns of proper equipment function in lab. 4th recollect obtained and sent to lab. Apologized to patient for additional lab draw.

## 2024-05-20 NOTE — ED NOTES
Pt up for discharge by ERP. Asked pt who to call to pick her up. Pt said to call granddaughter. Granddaughter called and reports she is unable to pickup patient as she lives 4 hours away. Daughter called and reports they are unable to transport patient and that they usually get her home via medical transportation services. ED  contacted to assist with arranging transportation home for patient.

## 2024-05-20 NOTE — ED NOTES
BREAK RN: Sample handling called stating 4th re-collect green top was hemolyzed. Tech states they could release the CMP but are unable to release troponin or provide staff with estimated lab value because of the test's sensitivity to hemolysis.

## 2024-05-20 NOTE — ED NOTES
Report given to Kianna at [facility]. Updated on patients plan for discharge and went over patients AVS. Awaiting arrangement of transport.

## 2024-05-24 ENCOUNTER — PATIENT MESSAGE (OUTPATIENT)
Dept: HEALTH INFORMATION MANAGEMENT | Facility: OTHER | Age: 84
End: 2024-05-24

## 2024-05-27 ENCOUNTER — HOME CARE VISIT (OUTPATIENT)
Dept: HOME HEALTH SERVICES | Facility: HOME HEALTHCARE | Age: 84
End: 2024-05-27
Payer: MEDICARE

## 2024-05-27 VITALS
WEIGHT: 170.25 LBS | RESPIRATION RATE: 16 BRPM | DIASTOLIC BLOOD PRESSURE: 56 MMHG | HEIGHT: 63 IN | OXYGEN SATURATION: 99 % | HEART RATE: 90 BPM | BODY MASS INDEX: 30.16 KG/M2 | SYSTOLIC BLOOD PRESSURE: 118 MMHG | TEMPERATURE: 97.6 F

## 2024-05-27 SDOH — ECONOMIC STABILITY: HOUSING INSECURITY: EVIDENCE OF SMOKING MATERIAL: 0

## 2024-05-27 ASSESSMENT — ENCOUNTER SYMPTOMS
DESCRIPTION OF MEMORY LOSS: SHORT TERM
LOWER EXTREMITY EDEMA: 1
DYSPNEA ON EXERTION: 1
DENIES PAIN: 1
FATIGUE: 1
DESCRIPTION OF MEMORY LOSS: LONG TERM
SHORTNESS OF BREATH: 1
DYSPNEA ACTIVITY LEVEL: AFTER AMBULATING MORE THAN 20 FT
FATIGUES EASILY: 1
DEPRESSED MOOD: 1
FORGETFULNESS: 1
DIFFICULTY THINKING: 1
PERSON REPORTING PAIN: PATIENT

## 2024-05-27 ASSESSMENT — FIBROSIS 4 INDEX: FIB4 SCORE: 2.9

## 2024-05-27 ASSESSMENT — ACTIVITIES OF DAILY LIVING (ADL): OASIS_M1830: 03

## 2024-05-28 ENCOUNTER — DOCUMENTATION (OUTPATIENT)
Dept: CARDIOLOGY | Facility: MEDICAL CENTER | Age: 84
End: 2024-05-28
Payer: MEDICARE

## 2024-05-28 ENCOUNTER — HOME CARE VISIT (OUTPATIENT)
Dept: HOME HEALTH SERVICES | Facility: HOME HEALTHCARE | Age: 84
End: 2024-05-28
Payer: MEDICARE

## 2024-05-28 NOTE — PROGRESS NOTES
Medication chart review for [facility] services    Received referral from [facility].   Medications reviewed  compared with discharge summary if available.  Discharge summary date, if applicable:   5/18/24    Current medication list per [facility]     Medication list one, patient is currently taking    Current Outpatient Medications:     Home Care Oxygen, 2 L/min, Inhalation, Continuous    fluticasone-umeclidinium-vilanterol, 1 Puff, Inhalation, DAILY    atenolol, 25 mg, Oral, DAILY    furosemide, 20 mg, Oral, DAILY    acetaminophen, 1,000 mg, Oral, TID PRN    levothyroxine, 50 mcg, Oral, AM ES    magnesium oxide, 400 mg, Oral, DAILY    rivaroxaban, 20 mg, Oral, PM MEAL    lisinopril, 5 mg, Oral, DAILY    Myrbetriq, 50 mg, Oral, DAILY    sertraline, 50 mg, Oral, QDAY      Medication list two, drugs that the patient has been prescribed or recommended to take by their healthcare provider on discharge summary    CHANGE how you take these medications         Instructions   atenolol 25 MG Tabs  What changed:   medication strength  how much to take  when to take this  Commonly known as: Tenormin    Take 1 Tablet by mouth every day. Indications: High Blood Pressure Disorder  Dose: 25 mg      furosemide 20 MG Tabs  What changed:   medication strength  how much to take  when to take this  Commonly known as: Lasix    Take 1 Tablet by mouth every day.  Dose: 20 mg                CONTINUE taking these medications         Instructions   acetaminophen 500 MG Tabs  Commonly known as: Tylenol    Take 1,000 mg by mouth 3 times a day as needed for Mild Pain. 2 tablets = 1,000 mg.  Dose: 1,000 mg      fluticasone-umeclidinium-vilanterol 100-62.5-25 mcg/act inhaler  Commonly known as: Trelegy Ellipta    Inhale 1 Puff every day.  Dose: 1 Puff      levothyroxine 50 MCG Tabs  Commonly known as: Synthroid    Take 50 mcg by mouth every morning on an empty stomach.  Dose: 50 mcg      lisinopril 5 MG Tabs  Commonly known as:  "Prinivil    Take 5 mg by mouth every day. Indications: High Blood Pressure Disorder  Dose: 5 mg      magnesium oxide 400 MG Tabs tablet  Commonly known as: Mag-Ox    Take 400 mg by mouth every day.  Dose: 400 mg      Myrbetriq 50 MG Tb24  Generic drug: Mirabegron ER    Take 50 mg by mouth every day. Indications: Overactive Bladder  Dose: 50 mg      sertraline 50 MG Tabs  Commonly known as: Zoloft    Take 1 Tablet by mouth every day. Indications: Major Depressive Disorder  Dose: 50 mg      Xarelto 20 MG Tabs tablet  Generic drug: rivaroxaban    Take 20 mg by mouth with dinner.  Dose: 20 mg          Allergies   Allergen Reactions    Amiodarone Hives     Throat and tongue itching    Bactrim Shortness of Breath    Cipro Xr Swelling    Metoprolol Swelling     Causes throat swelling    Morphine Unspecified     Hallucinations    Phytoplex Z-Guard [Petrolatum-Zinc Oxide] Unspecified     \"causes burning\"    Pseudoephedrine Palpitations    Qvar [Beclomethasone Dipropionate] Unspecified     Pressure on heart      Vibramycin Shortness of Breath    Atorvastatin Calcium-Polysorbate 80 Unspecified     Muscle aches      Augmentin Unspecified     Unknown reaction    Diltiazem Rash     rash    Flecainide Unspecified     dizziness    Keflex Unspecified     Pt states \"Unsure\".    Mucinex Unspecified     GI Distress      Tramadol Unspecified     crying    Atorvastatin Myalgia    Tape Rash     Paper tape okay       Labs     Lab Results   Component Value Date/Time    SODIUM 138 05/19/2024 11:09 PM    POTASSIUM 5.3 05/19/2024 11:09 PM    CHLORIDE 103 05/19/2024 11:09 PM    CO2 22 05/19/2024 11:09 PM    GLUCOSE 140 (H) 05/19/2024 11:09 PM    BUN 27 (H) 05/19/2024 11:09 PM    CREATININE 0.98 05/19/2024 11:09 PM    CREATININE 0.78 07/29/2010 12:00 AM    BUNCREATRAT 17 07/29/2010 12:00 AM    GLOMRATE >59 07/29/2010 12:00 AM     Lab Results   Component Value Date/Time    ALKPHOSPHAT 90 05/19/2024 11:09 PM    ASTSGOT 31 05/19/2024 11:09 PM    " ALTSGPT 12 05/19/2024 11:09 PM    TBILIRUBIN 0.5 05/19/2024 11:09 PM    INR 1.53 (H) 05/04/2024 02:08 PM    ALBUMIN 3.4 05/19/2024 11:09 PM        Assessment for clinically significant drug interactions, drug omissions/additions, duplicative therapies.            CC   Jolie Escobar M.D.  [address]  [facility] [address]  Fax: [phone]    [facility]  Phone [phone] fax [phone]    This note was created using voice recognition software (Dragon). The accuracy of the dictation is limited by the abilities of the software. I have reviewed the note prior to signing, however some errors in grammar and context are still possible. If you have any questions related to this note please do not hesitate to contact our office.

## 2024-05-28 NOTE — CASE COMMUNICATION
OT spoke with client's daughter, Reina, re scheduling reassessment visit.  Daughter states they received  a message from the state saying they need to have MD appt prior to any more home health.  Reina asked OT to phone back at the beginning of next week to schedule.  They plan to see MD later this week.

## 2024-05-29 ENCOUNTER — HOME CARE VISIT (OUTPATIENT)
Dept: HOME HEALTH SERVICES | Facility: HOME HEALTHCARE | Age: 84
End: 2024-05-29
Payer: MEDICARE

## 2024-05-31 ENCOUNTER — HOME CARE VISIT (OUTPATIENT)
Dept: HOME HEALTH SERVICES | Facility: HOME HEALTHCARE | Age: 84
End: 2024-05-31
Payer: MEDICARE

## 2024-05-31 ENCOUNTER — OFFICE VISIT (OUTPATIENT)
Dept: MEDICAL GROUP | Facility: MEDICAL CENTER | Age: 84
End: 2024-05-31
Payer: MEDICARE

## 2024-05-31 VITALS
DIASTOLIC BLOOD PRESSURE: 58 MMHG | TEMPERATURE: 97.9 F | SYSTOLIC BLOOD PRESSURE: 108 MMHG | RESPIRATION RATE: 20 BRPM | WEIGHT: 169.9 LBS | HEIGHT: 62 IN | BODY MASS INDEX: 31.27 KG/M2 | OXYGEN SATURATION: 93 % | HEART RATE: 90 BPM

## 2024-05-31 DIAGNOSIS — F33.0 MILD EPISODE OF RECURRENT MAJOR DEPRESSIVE DISORDER (HCC): ICD-10-CM

## 2024-05-31 DIAGNOSIS — I10 PRIMARY HYPERTENSION: ICD-10-CM

## 2024-05-31 DIAGNOSIS — Z02.89 ENCOUNTER FOR COMPLETION OF FORM WITH PATIENT: ICD-10-CM

## 2024-05-31 DIAGNOSIS — R41.3 MEMORY IMPAIRMENT: ICD-10-CM

## 2024-05-31 DIAGNOSIS — I48.19 PERSISTENT ATRIAL FIBRILLATION (HCC): ICD-10-CM

## 2024-05-31 DIAGNOSIS — R32 URINARY INCONTINENCE, UNSPECIFIED TYPE: ICD-10-CM

## 2024-05-31 PROBLEM — G30.9 MODERATE ALZHEIMER'S DEMENTIA WITHOUT BEHAVIORAL DISTURBANCE, PSYCHOTIC DISTURBANCE, MOOD DISTURBANCE, OR ANXIETY (HCC): Status: RESOLVED | Noted: 2024-05-17 | Resolved: 2024-05-31

## 2024-05-31 PROBLEM — F02.B0 MODERATE ALZHEIMER'S DEMENTIA WITHOUT BEHAVIORAL DISTURBANCE, PSYCHOTIC DISTURBANCE, MOOD DISTURBANCE, OR ANXIETY (HCC): Status: RESOLVED | Noted: 2024-05-17 | Resolved: 2024-05-31

## 2024-05-31 ASSESSMENT — FIBROSIS 4 INDEX: FIB4 SCORE: 2.9

## 2024-05-31 NOTE — PROGRESS NOTES
"Subjective:     CC: Complete paperwork    HPI:   Donte presents today with paperwork that needs to be completed for her assisted living facility.  She has multiple medical problems including A-fib, pacemaker placement and hypertension.  She is following with cardiology.  Her blood pressure is well-controlled today with atenolol 25 mg daily and lisinopril 5 mg daily.  She has hypothyroidism for which she takes levothyroxine 50 mcg daily.  She is on Xarelto 20 mg nightly for anticoagulation.  She uses Trelegy Ellipta to help with wheezing.  She has urinary incontinence for which she uses Myrbetriq.  She has depression that is well-controlled with blood 50 mg daily.  She is ambulatory.  She is unable to manage her own medications but can do all of her ADLs.    ROS:  ROS    Objective:     Exam:  /58 (BP Location: Left arm, Patient Position: Sitting, BP Cuff Size: Adult)   Pulse 90   Temp 36.6 °C (97.9 °F) (Temporal)   Resp 20   Ht 1.575 m (5' 2\")   Wt 77.1 kg (169 lb 14.4 oz)   LMP 01/10/1975   SpO2 93%   BMI 31.08 kg/m²  Body mass index is 31.08 kg/m².    Physical Exam  Vitals reviewed.   Constitutional:       General: She is not in acute distress.     Appearance: She is not toxic-appearing.   HENT:      Head: Normocephalic and atraumatic.      Right Ear: External ear normal.      Left Ear: External ear normal.   Eyes:      General:         Right eye: No discharge.         Left eye: No discharge.      Extraocular Movements: Extraocular movements intact.      Conjunctiva/sclera: Conjunctivae normal.   Cardiovascular:      Rate and Rhythm: Normal rate and regular rhythm.      Heart sounds: Normal heart sounds. No murmur heard.  Pulmonary:      Effort: Pulmonary effort is normal. No respiratory distress.      Breath sounds: Normal breath sounds. No wheezing or rales.   Skin:     General: Skin is warm and dry.      Comments: No skin breaks seen   Neurological:      Mental Status: She is alert and oriented to " person, place, and time.   Psychiatric:         Mood and Affect: Mood normal.         Behavior: Behavior normal.         Thought Content: Thought content normal.         Judgment: Judgment normal.           Assessment & Plan:     83 y.o. female with the following -     1. Encounter for completion of form with patient  Paperwork completed for assisted living facility    2. Persistent atrial fibrillation (HCC)  Stable today, continue Xarelto and atenolol    3. Mild episode of recurrent major depressive disorder (HCC)  Stable, continue Zoloft    4. Memory impairment  Memory impairment but is alert and oriented x 3, able to perform all ADLs    5. Primary hypertension  Well-controlled today, continue atenolol and lisinopril    6. Urinary incontinence, unspecified type  Continue Myrbetriq at current dosing, well-controlled    HCC Gap Form    Last edited 05/31/24 12:48 PDT by Jolie Escobar M.D.         No follow-ups on file.    Please note that this dictation was created using voice recognition software. I have made every reasonable attempt to correct obvious errors, but I expect that there are errors of grammar and possibly content that I did not discover before finalizing the note.

## 2024-06-03 ENCOUNTER — HOME CARE VISIT (OUTPATIENT)
Dept: HOME HEALTH SERVICES | Facility: HOME HEALTHCARE | Age: 84
End: 2024-06-03
Payer: MEDICARE

## 2024-06-03 PROCEDURE — G0299 HHS/HOSPICE OF RN EA 15 MIN: HCPCS

## 2024-06-03 ASSESSMENT — FIBROSIS 4 INDEX: FIB4 SCORE: 2.9

## 2024-06-03 NOTE — CASE COMMUNICATION
No answer to either phone number & unable to leave message. Daughter texted. Daughter refused visit today due to Dr sanchez. Notified of anticipated schedule of Mon-Wed-Fri this week & requested she update nurse of availablity. No response.
A negative

## 2024-06-04 VITALS
BODY MASS INDEX: 31.02 KG/M2 | WEIGHT: 169.6 LBS | TEMPERATURE: 97.4 F | OXYGEN SATURATION: 96 % | DIASTOLIC BLOOD PRESSURE: 50 MMHG | RESPIRATION RATE: 16 BRPM | HEART RATE: 84 BPM | SYSTOLIC BLOOD PRESSURE: 90 MMHG

## 2024-06-04 ASSESSMENT — ENCOUNTER SYMPTOMS
MUSCLE WEAKNESS: 1
DENIES PAIN: 1
POOR JUDGMENT: 1
BOWEL PATTERN NORMAL: 1
LAST BOWEL MOVEMENT: 66994
STOOL FREQUENCY: DAILY

## 2024-06-05 ENCOUNTER — HOME CARE VISIT (OUTPATIENT)
Dept: HOME HEALTH SERVICES | Facility: HOME HEALTHCARE | Age: 84
End: 2024-06-05
Payer: MEDICARE

## 2024-06-05 PROCEDURE — G0153 HHCP-SVS OF S/L PATH,EA 15MN: HCPCS

## 2024-06-06 ENCOUNTER — HOME CARE VISIT (OUTPATIENT)
Dept: HOME HEALTH SERVICES | Facility: HOME HEALTHCARE | Age: 84
End: 2024-06-06
Payer: MEDICARE

## 2024-06-06 VITALS
OXYGEN SATURATION: 96 % | TEMPERATURE: 97.8 F | RESPIRATION RATE: 16 BRPM | HEART RATE: 68 BPM | DIASTOLIC BLOOD PRESSURE: 60 MMHG | SYSTOLIC BLOOD PRESSURE: 105 MMHG

## 2024-06-06 PROCEDURE — G0151 HHCP-SERV OF PT,EA 15 MIN: HCPCS

## 2024-06-06 ASSESSMENT — ENCOUNTER SYMPTOMS
MUSCLE WEAKNESS: 1
ARTHRALGIAS: 1
DENIES PAIN: 1
PERSON REPORTING PAIN: PATIENT

## 2024-06-06 ASSESSMENT — PATIENT HEALTH QUESTIONNAIRE - PHQ9: CLINICAL INTERPRETATION OF PHQ2 SCORE: 0

## 2024-06-06 ASSESSMENT — ACTIVITIES OF DAILY LIVING (ADL)
AMBULATION ASSISTANCE: STAND BY ASSIST
AMBULATION ASSISTANCE ON FLAT SURFACES: 1
CURRENT_FUNCTION: ONE PERSON

## 2024-06-06 NOTE — Clinical Note
PT evaluation completed, requesting follow up visits with frequency of 1w1,2w1.1w2 effective 06/06/2024.

## 2024-06-07 ENCOUNTER — HOME CARE VISIT (OUTPATIENT)
Dept: HOME HEALTH SERVICES | Facility: HOME HEALTHCARE | Age: 84
End: 2024-06-07
Payer: MEDICARE

## 2024-06-07 NOTE — CASE COMMUNICATION
daughter texted with no response. Went to apt. Pt resting on sofa. Refused visit & said daughter watching her

## 2024-06-09 ENCOUNTER — HOME CARE VISIT (OUTPATIENT)
Dept: HOME HEALTH SERVICES | Facility: HOME HEALTHCARE | Age: 84
End: 2024-06-09
Payer: MEDICARE

## 2024-06-09 VITALS
OXYGEN SATURATION: 98 % | TEMPERATURE: 97.4 F | SYSTOLIC BLOOD PRESSURE: 98 MMHG | DIASTOLIC BLOOD PRESSURE: 56 MMHG | RESPIRATION RATE: 16 BRPM | HEART RATE: 81 BPM

## 2024-06-09 ASSESSMENT — ENCOUNTER SYMPTOMS
PERSON REPORTING PAIN: PATIENT
DENIES PAIN: 1

## 2024-06-09 NOTE — CASE COMMUNICATION
noted  ----- Message -----  From: Adalberto Mello, PT  Sent: 6/6/2024  10:44 PM PDT  To: Chela Mohamud R.N.; Yudi Sauceda, SLP; *      PT evaluation completed, requesting follow up visits with frequency of 1w1,2w1.1w2 effective 06/06/2024.

## 2024-06-10 ENCOUNTER — HOME CARE VISIT (OUTPATIENT)
Dept: HOME HEALTH SERVICES | Facility: HOME HEALTHCARE | Age: 84
End: 2024-06-10
Payer: MEDICARE

## 2024-06-10 VITALS
TEMPERATURE: 97.6 F | OXYGEN SATURATION: 95 % | RESPIRATION RATE: 16 BRPM | DIASTOLIC BLOOD PRESSURE: 60 MMHG | SYSTOLIC BLOOD PRESSURE: 100 MMHG | HEART RATE: 65 BPM

## 2024-06-10 PROCEDURE — G0151 HHCP-SERV OF PT,EA 15 MIN: HCPCS

## 2024-06-10 ASSESSMENT — ENCOUNTER SYMPTOMS
PERSON REPORTING PAIN: PATIENT
DIFFICULTY THINKING: 1
DENIES PAIN: 1

## 2024-06-10 ASSESSMENT — PATIENT HEALTH QUESTIONNAIRE - PHQ9: CLINICAL INTERPRETATION OF PHQ2 SCORE: 0

## 2024-06-10 NOTE — Clinical Note
Texted pt's daughter for an appointment,no response,unable to leave message ,mailbox full. I drove by after several ringing doorbel, knocking no answer. A lady acroos the the room said someone wearig green came and picked up patient.

## 2024-06-12 ENCOUNTER — HOME CARE VISIT (OUTPATIENT)
Dept: HOME HEALTH SERVICES | Facility: HOME HEALTHCARE | Age: 84
End: 2024-06-12
Payer: MEDICARE

## 2024-06-12 ENCOUNTER — HOSPITAL ENCOUNTER (EMERGENCY)
Facility: MEDICAL CENTER | Age: 84
End: 2024-06-13
Attending: STUDENT IN AN ORGANIZED HEALTH CARE EDUCATION/TRAINING PROGRAM
Payer: MEDICARE

## 2024-06-12 ENCOUNTER — APPOINTMENT (OUTPATIENT)
Dept: RADIOLOGY | Facility: MEDICAL CENTER | Age: 84
End: 2024-06-12
Attending: STUDENT IN AN ORGANIZED HEALTH CARE EDUCATION/TRAINING PROGRAM
Payer: MEDICARE

## 2024-06-12 VITALS
OXYGEN SATURATION: 98 % | HEART RATE: 82 BPM | DIASTOLIC BLOOD PRESSURE: 64 MMHG | RESPIRATION RATE: 16 BRPM | TEMPERATURE: 98.2 F | SYSTOLIC BLOOD PRESSURE: 102 MMHG

## 2024-06-12 DIAGNOSIS — G89.29 CHRONIC LEFT HIP PAIN: ICD-10-CM

## 2024-06-12 DIAGNOSIS — F03.90 DEMENTIA, UNSPECIFIED DEMENTIA SEVERITY, UNSPECIFIED DEMENTIA TYPE, UNSPECIFIED WHETHER BEHAVIORAL, PSYCHOTIC, OR MOOD DISTURBANCE OR ANXIETY (HCC): ICD-10-CM

## 2024-06-12 DIAGNOSIS — R07.9 CHEST PAIN, UNSPECIFIED TYPE: ICD-10-CM

## 2024-06-12 DIAGNOSIS — M25.552 CHRONIC LEFT HIP PAIN: ICD-10-CM

## 2024-06-12 LAB
ALBUMIN SERPL BCP-MCNC: 4.4 G/DL (ref 3.2–4.9)
ALBUMIN/GLOB SERPL: 1.2 G/DL
ALP SERPL-CCNC: 113 U/L (ref 30–99)
ALT SERPL-CCNC: 9 U/L (ref 2–50)
ANION GAP SERPL CALC-SCNC: 18 MMOL/L (ref 7–16)
AST SERPL-CCNC: 18 U/L (ref 12–45)
BASOPHILS # BLD AUTO: 0.6 % (ref 0–1.8)
BASOPHILS # BLD: 0.07 K/UL (ref 0–0.12)
BILIRUB SERPL-MCNC: 0.8 MG/DL (ref 0.1–1.5)
BUN SERPL-MCNC: 27 MG/DL (ref 8–22)
CALCIUM ALBUM COR SERPL-MCNC: 10.4 MG/DL (ref 8.5–10.5)
CALCIUM SERPL-MCNC: 10.7 MG/DL (ref 8.5–10.5)
CHLORIDE SERPL-SCNC: 102 MMOL/L (ref 96–112)
CO2 SERPL-SCNC: 22 MMOL/L (ref 20–33)
CREAT SERPL-MCNC: 1.14 MG/DL (ref 0.5–1.4)
EOSINOPHIL # BLD AUTO: 0.15 K/UL (ref 0–0.51)
EOSINOPHIL NFR BLD: 1.3 % (ref 0–6.9)
ERYTHROCYTE [DISTWIDTH] IN BLOOD BY AUTOMATED COUNT: 46.2 FL (ref 35.9–50)
GFR SERPLBLD CREATININE-BSD FMLA CKD-EPI: 47 ML/MIN/1.73 M 2
GLOBULIN SER CALC-MCNC: 3.6 G/DL (ref 1.9–3.5)
GLUCOSE SERPL-MCNC: 141 MG/DL (ref 65–99)
HCT VFR BLD AUTO: 46.9 % (ref 37–47)
HGB BLD-MCNC: 15.6 G/DL (ref 12–16)
IMM GRANULOCYTES # BLD AUTO: 0.07 K/UL (ref 0–0.11)
IMM GRANULOCYTES NFR BLD AUTO: 0.6 % (ref 0–0.9)
LYMPHOCYTES # BLD AUTO: 1.61 K/UL (ref 1–4.8)
LYMPHOCYTES NFR BLD: 13.6 % (ref 22–41)
MCH RBC QN AUTO: 31.1 PG (ref 27–33)
MCHC RBC AUTO-ENTMCNC: 33.3 G/DL (ref 32.2–35.5)
MCV RBC AUTO: 93.4 FL (ref 81.4–97.8)
MONOCYTES # BLD AUTO: 0.75 K/UL (ref 0–0.85)
MONOCYTES NFR BLD AUTO: 6.3 % (ref 0–13.4)
NEUTROPHILS # BLD AUTO: 9.17 K/UL (ref 1.82–7.42)
NEUTROPHILS NFR BLD: 77.6 % (ref 44–72)
NRBC # BLD AUTO: 0 K/UL
NRBC BLD-RTO: 0 /100 WBC (ref 0–0.2)
PLATELET # BLD AUTO: 293 K/UL (ref 164–446)
PMV BLD AUTO: 9.9 FL (ref 9–12.9)
POTASSIUM SERPL-SCNC: 4.4 MMOL/L (ref 3.6–5.5)
PROT SERPL-MCNC: 8 G/DL (ref 6–8.2)
RBC # BLD AUTO: 5.02 M/UL (ref 4.2–5.4)
SODIUM SERPL-SCNC: 142 MMOL/L (ref 135–145)
TROPONIN T SERPL-MCNC: 26 NG/L (ref 6–19)
WBC # BLD AUTO: 11.8 K/UL (ref 4.8–10.8)

## 2024-06-12 PROCEDURE — 93005 ELECTROCARDIOGRAM TRACING: CPT

## 2024-06-12 PROCEDURE — 93005 ELECTROCARDIOGRAM TRACING: CPT | Performed by: STUDENT IN AN ORGANIZED HEALTH CARE EDUCATION/TRAINING PROGRAM

## 2024-06-12 PROCEDURE — 71045 X-RAY EXAM CHEST 1 VIEW: CPT

## 2024-06-12 PROCEDURE — 80053 COMPREHEN METABOLIC PANEL: CPT

## 2024-06-12 PROCEDURE — 700101 HCHG RX REV CODE 250: Performed by: STUDENT IN AN ORGANIZED HEALTH CARE EDUCATION/TRAINING PROGRAM

## 2024-06-12 PROCEDURE — 700102 HCHG RX REV CODE 250 W/ 637 OVERRIDE(OP): Performed by: STUDENT IN AN ORGANIZED HEALTH CARE EDUCATION/TRAINING PROGRAM

## 2024-06-12 PROCEDURE — A9270 NON-COVERED ITEM OR SERVICE: HCPCS | Performed by: STUDENT IN AN ORGANIZED HEALTH CARE EDUCATION/TRAINING PROGRAM

## 2024-06-12 PROCEDURE — G0152 HHCP-SERV OF OT,EA 15 MIN: HCPCS

## 2024-06-12 PROCEDURE — 99285 EMERGENCY DEPT VISIT HI MDM: CPT

## 2024-06-12 PROCEDURE — 73502 X-RAY EXAM HIP UNI 2-3 VIEWS: CPT | Mod: LT

## 2024-06-12 PROCEDURE — 36415 COLL VENOUS BLD VENIPUNCTURE: CPT

## 2024-06-12 PROCEDURE — 84484 ASSAY OF TROPONIN QUANT: CPT

## 2024-06-12 PROCEDURE — 700105 HCHG RX REV CODE 258: Performed by: STUDENT IN AN ORGANIZED HEALTH CARE EDUCATION/TRAINING PROGRAM

## 2024-06-12 PROCEDURE — 85025 COMPLETE CBC W/AUTO DIFF WBC: CPT

## 2024-06-12 RX ORDER — ASPIRIN 81 MG/1
324 TABLET, CHEWABLE ORAL ONCE
Status: COMPLETED | OUTPATIENT
Start: 2024-06-12 | End: 2024-06-12

## 2024-06-12 RX ORDER — SODIUM CHLORIDE, SODIUM LACTATE, POTASSIUM CHLORIDE, CALCIUM CHLORIDE 600; 310; 30; 20 MG/100ML; MG/100ML; MG/100ML; MG/100ML
1000 INJECTION, SOLUTION INTRAVENOUS ONCE
Status: COMPLETED | OUTPATIENT
Start: 2024-06-12 | End: 2024-06-13

## 2024-06-12 RX ORDER — LIDOCAINE 4 G/G
1 PATCH TOPICAL EVERY 24 HOURS
Status: DISCONTINUED | OUTPATIENT
Start: 2024-06-12 | End: 2024-06-13 | Stop reason: HOSPADM

## 2024-06-12 RX ADMIN — LIDOCAINE 1 PATCH: 4 PATCH TOPICAL at 22:04

## 2024-06-12 RX ADMIN — ASPIRIN 324 MG: 81 TABLET, CHEWABLE ORAL at 22:04

## 2024-06-12 RX ADMIN — SODIUM CHLORIDE, POTASSIUM CHLORIDE, SODIUM LACTATE AND CALCIUM CHLORIDE 1000 ML: 600; 310; 30; 20 INJECTION, SOLUTION INTRAVENOUS at 22:45

## 2024-06-12 ASSESSMENT — ACTIVITIES OF DAILY LIVING (ADL)
BATHING ASSESSED: 1
HOUSEKEEPING ASSESSED: 1
LAUNDRY: DEPENDENT
TRANSPORTATION: DEPENDENT
USING THE TELPHONE: NEEDS ASSISTANCE
TOILETING: 1
PREPARING MEALS: DEPENDENT
BATHING ASSESSED: 1
TRANSPORTATION ASSESSED: 1
LAUNDRY ASSESSED: 1
TOILETING: 1
PHYSICAL TRANSFERS ASSESSED: 1
GROOMING EQUIPMENT USED: REFUSED TO DEMONSTRATE
GROOMING ASSESSED: 1
SHOPPING: DEPENDENT
PHYSICAL TRANSFERS ASSESSED: 1
SHOPPING ASSESSED: 1
AMBULATION ASSISTANCE: 1
HOUSEKEEPING ASSESSED: 1
FEEDING ASSESSED: 1
TRANSPORTATION ASSESSED: 1
SHOPPING: DEPENDENT
ORAL_CARE_EQUIPMENT_USED: REFUSED TO DEMONSTRATE
FEEDING ASSESSED: 1
TRANSPORTATION: DEPENDENT
ORAL_CARE_ASSESSED: 1
LAUNDRY: DEPENDENT
GROOMING ASSESSED: 1
BATHING EQUIPMENT USED: REFUSED TO DEMONSTRATE
TOILETING EQUIPMENT USED: REFUSED TO DEMONSTRATE
ORAL_CARE_EQUIPMENT_USED: REFUSED TO DEMONSTRATE
ORAL_CARE_ASSESSED: 1
TELEPHONE USE ASSESSED: 1
LAUNDRY ASSESSED: 1
BATHING EQUIPMENT USED: REFUSED TO DEMONSTRATE
GROOMING EQUIPMENT USED: REFUSED TO DEMONSTRATE
LIGHT HOUSEKEEPING: DEPENDENT
AMBULATION ASSISTANCE: 1
SHOPPING ASSESSED: 1
FEEDING EQUIPMENT USED: REFUSED TO DEMONSTRATE
LIGHT HOUSEKEEPING: DEPENDENT
TOILETING EQUIPMENT USED: REFUSED TO DEMONSTRATE
PREPARING MEALS: DEPENDENT
USING THE TELPHONE: NEEDS ASSISTANCE
FEEDING EQUIPMENT USED: REFUSED TO DEMONSTRATE
TELEPHONE USE ASSESSED: 1

## 2024-06-12 ASSESSMENT — ENCOUNTER SYMPTOMS
PERSON REPORTING PAIN: PATIENT
DIFFICULTY THINKING: 1
SUBJECTIVE PAIN PROGRESSION: WAXING AND WANING
PAIN: 1
PERSON REPORTING PAIN: PATIENT
LOWEST PAIN SEVERITY IN PAST 24 HOURS: 4/10
PAIN LOCATION: BACK
PAIN LOCATION: BACK
HIGHEST PAIN SEVERITY IN PAST 24 HOURS: 6/10
SUBJECTIVE PAIN PROGRESSION: WAXING AND WANING
PAIN: 1
PAIN SEVERITY GOAL: 0/10
LOWEST PAIN SEVERITY IN PAST 24 HOURS: 4/10
PAIN SEVERITY GOAL: 0/10
HIGHEST PAIN SEVERITY IN PAST 24 HOURS: 6/10
DIFFICULTY THINKING: 1

## 2024-06-12 ASSESSMENT — FIBROSIS 4 INDEX: FIB4 SCORE: 2.9

## 2024-06-12 ASSESSMENT — PAIN SCALES - WONG BAKER
WONGBAKER_NUMERICALRESPONSE: DOESN'T HURT AT ALL
WONGBAKER_NUMERICALRESPONSE: DOESN'T HURT AT ALL

## 2024-06-12 NOTE — CASE COMMUNICATION
OCCUPATIONAL THERAPY REASSESSMENT AND PLAN OF CARE     ·       Patient:  Donte Willoughbyspeth            Recommend skilled HHOT to address deficits and improve function-report sent to MD     ·       Frequency:   1W1, effective 6/12/24     ·       Goals: NA Client refused continued OT.  States she has enough therapy with PT     Does the patient get SOB with Minimum exertion?  unknown.  Refused to participate in most activities    How often ( if at all) does pain interfere with patient's movements?  occasionally

## 2024-06-12 NOTE — CASE COMMUNICATION
Quality Review Completed for 5/27 AMADO OASIS by LILLIE Stockton RN on 6/12/2024:     Edits completed by LILLIE Stockton RN:     1.  and  diagnosis coding updated per chart review  2.  is 5/20/24  3.  is 5/15 per care everywhere. The ED visits are not counted as inpatient  4. Checked incontinence to 485 functional limitations.  5. O2 added to triage code

## 2024-06-12 NOTE — CASE COMMUNICATION
Patient discussed today in interdisciplinary team meeting. Concerns noted of daughter not responding to nursing/OT/PT Plan to address issue is nursing discontinued. patient does cooperate with PT.

## 2024-06-12 NOTE — CASE COMMUNICATION
OT attempted to conduct initial evaluation 6/12/24.  Client was uncoorporative, refusing to get off couch.  She states she doesn't want any more therapy since she is already getting PT.  OT will be evaluation only.

## 2024-06-12 NOTE — CASE COMMUNICATION
I agree with these changes.   ----- Message -----  From: Angelica Stockton R.N.  Sent: 6/12/2024   9:51 AM PDT  To: Olivia Dudley R.N.      Quality Review Completed for 5/27 AMADO OASIS by LILLIE Stockton, RN on 6/12/2024:     Edits completed by LILLIE Stockton RN:     1.  and  diagnosis coding updated per chart review  2.  is 5/20/24  3.  is 5/15 per care everywhere. The ED visits are not counted as inpatient  4. Checked incont inence to 485 functional limitations.  5. O2 added to triage code

## 2024-06-12 NOTE — CASE COMMUNICATION
noted  ----- Message -----  From: Kelly Caba R.N.  Sent: 6/10/2024   4:04 PM PDT  To: Chela Mohamud R.N.; Sneha Medina R.N.; *      Texted pt's daughter for an appointment,no response,unable to leave message ,mailbox full. I drove by after several ringing doorbel, knocking no answer. A lady acroos the the room said someone wearig green came and picked up patient.

## 2024-06-13 ENCOUNTER — HOME CARE VISIT (OUTPATIENT)
Dept: HOME HEALTH SERVICES | Facility: HOME HEALTHCARE | Age: 84
End: 2024-06-13
Payer: MEDICARE

## 2024-06-13 VITALS
TEMPERATURE: 97.5 F | SYSTOLIC BLOOD PRESSURE: 129 MMHG | WEIGHT: 169.75 LBS | BODY MASS INDEX: 31.24 KG/M2 | HEIGHT: 62 IN | HEART RATE: 85 BPM | DIASTOLIC BLOOD PRESSURE: 64 MMHG | OXYGEN SATURATION: 96 % | RESPIRATION RATE: 20 BRPM

## 2024-06-13 VITALS
SYSTOLIC BLOOD PRESSURE: 100 MMHG | RESPIRATION RATE: 18 BRPM | OXYGEN SATURATION: 96 % | DIASTOLIC BLOOD PRESSURE: 60 MMHG | TEMPERATURE: 98.1 F | HEART RATE: 86 BPM

## 2024-06-13 LAB
EKG IMPRESSION: NORMAL
TROPONIN T SERPL-MCNC: 23 NG/L (ref 6–19)

## 2024-06-13 PROCEDURE — G0151 HHCP-SERV OF PT,EA 15 MIN: HCPCS

## 2024-06-13 RX ORDER — LIDOCAINE 50 MG/G
1 PATCH TOPICAL EVERY 24 HOURS
Qty: 10 PATCH | Refills: 0 | Status: SHIPPED | OUTPATIENT
Start: 2024-06-13 | End: 2024-06-25

## 2024-06-13 ASSESSMENT — HEART SCORE
RISK FACTORS: >2 RISK FACTORS OR HX OF ATHEROSCLEROTIC DISEASE
AGE: 65+
HEART SCORE: 5
ECG: NON-SPECIFIC REPOLARIZATION DISTURBANCE
TROPONIN: LESS THAN OR EQUAL TO NORMAL LIMIT
HISTORY: SLIGHTLY SUSPICIOUS

## 2024-06-13 ASSESSMENT — PATIENT HEALTH QUESTIONNAIRE - PHQ9: CLINICAL INTERPRETATION OF PHQ2 SCORE: 0

## 2024-06-13 ASSESSMENT — ENCOUNTER SYMPTOMS
PERSON REPORTING PAIN: PATIENT
PAIN: 1
PAIN LOCATION - PAIN QUALITY: ACHE
POOR JUDGMENT: 1
DIFFICULTY THINKING: 1
PAIN LOCATION: LEFT HIP
PAIN LOCATION - PAIN FREQUENCY: FREQUENT
PAIN LOCATION - PAIN SEVERITY: 6/10

## 2024-06-13 ASSESSMENT — PAIN SCALES - WONG BAKER: WONGBAKER_NUMERICALRESPONSE: DOESN'T HURT AT ALL

## 2024-06-13 NOTE — ED NOTES
The pt is laying in bed with eyes closed. Breathing is even and unlabored. No indicators of pain noted. The pt arousable to voice and becomes oriented. Vitals stable

## 2024-06-13 NOTE — ED NOTES
Discharge instructions reviewed with patient and signed. IV was removed from arm.  They verbalized understanding of follow up instructions. All belongings with patient.

## 2024-06-13 NOTE — ED NOTES
Break RN: Called SW to get pt back to clear water at [facility], MATY finding [facility] transport for pt back home.

## 2024-06-13 NOTE — ED PROVIDER NOTES
"  ER Provider Note    Scribed for Susanna Healy M.D. by Binta Rosales. 6/12/2024   9:24 PM    Primary Care Provider: Jolie Escobar M.D.    CHIEF COMPLAINT  Chief Complaint   Patient presents with    Chest Pain     The pt reports intermittent chest pain that started about an hour ago. Pain is on the left side, 5/10, sharp. The pt coming from [facility]. The pt alert and oriented x4.     Fatigue     The pt reports feeling \"lousy\" starting at 10am     EXTERNAL RECORDS REVIEWED  Outpatient Notes Patient seen frequently with home health care. Per note today with OT, the patient would not get off the couch.   Inpatient Notes: Patient has had 2 chest pain workup admissions in the past 60 days, on April 12, and on 5/17    HPI/ROS  LIMITATION TO HISTORY   Select: Poor historian  OUTSIDE HISTORIAN(S):  Family patient's daughter provides helpful collateral information, notes the patient has a history of chronic musculoskeletal pain, her mental status is been diminished since she was recently diagnosed with pneumonia.    Donte Magaña is a 83 y.o. female, with a history of Afib, Lupus, hypothyroidism, who presents to the ED, via EMS to bedside from [facility], for evaluation of intermittent chest pain onset one hour ago. She describes the pain as \"pressured\" and states that she has never had this quality of pain before. She also reports having left hip pain which has been hurting for a while. She denies any recent falls or injuries. Patient is cared for by her daughter in the home.  Unfortunately, history is somewhat limited due to patient's underlying cognitive impairment, and she is unable to delineate her symptoms further.    PAST MEDICAL HISTORY  Past Medical History:   Diagnosis Date    A-fib (HCC)     Anesthesia     \"Tachycardia for 5 days after cataract surgery\"    Anticoagulant long-term use 1/12/2012    Arthritis     osteo-Knees, hips    Atrial fibrillation (HCC)     Backpain     R " hip    Bowel habit changes     diarrhea    Breath shortness     with exertion, prn O2 2L    Bronchitis Nov, 2013    CAD (coronary artery disease)     Depression     Glaucoma 5/3/2011    Hematoma complicating a procedure 11/3/2012    Hemorrhagic disorder (HCC)     bruising/coumadin    Hypertension     Hypothyroid     Lupus (HCC)     Macular degeneration     Menopause 1/12/2012    Mitral regurgitation 10/30/2012    Obesity 1/12/2012    Pacemaker 2018    Pneumonia feb,2013    Pre-syncope 6/29/2018    Pulmonary hypertension (HCC) 10/30/2012    PVC's (premature ventricular contractions) 1/12/2012    Senile nuclear sclerosis     Spinal stenosis of lumbar region at multiple levels     Unspecified cataract     repaired bilateral    Unspecified urinary incontinence     Urinary bladder disorder        SURGICAL HISTORY  Past Surgical History:   Procedure Laterality Date    WY COMBINED ANT/POST COLPORRHAPHY  1/14/2020    Procedure: COLPORRHAPHY, COMBINED ANTEROPOSTERIOR - PERINEOPLASTY;  Surgeon: Waqas Robin M.D.;  Location: SURGERY SAME DAY [facility];  Service: Gynecology    WY LAP,DIAGNOSTIC ABDOMEN  1/14/2020    Procedure: PELVISCOPY;  Surgeon: Waqas Robin M.D.;  Location: SURGERY SAME DAY [facility];  Service: Gynecology    ENTEROCELE REPAIR  1/14/2020    Procedure: REPAIR, ENTEROCELE;  Surgeon: Waqas Robin M.D.;  Location: SURGERY SAME DAY [facility];  Service: Gynecology    BLADDER SLING FEMALE  1/14/2020    Procedure: BLADDER SLING, FEMALE - TOT;  Surgeon: Waqas Robin M.D.;  Location: SURGERY SAME DAY [facility];  Service: Gynecology    VAGINAL SUSPENSION  1/14/2020    Procedure: COLPOPEXY - SACROSPINOUS VAULT SUSPENSION;  Surgeon: Waqas Robin M.D.;  Location: SURGERY SAME DAY [facility];  Service: Gynecology    SALPINGO OOPHORECTOMY Bilateral 1/14/2020    Procedure: SALPINGO-OOPHORECTOMY;  Surgeon: Waqas Robin M.D.;  Location: SURGERY SAME DAY [facility];  Service: Gynecology     IRRIGATION & DEBRIDEMENT ORTHO Right 2/14/2019    Procedure: IRRIGATION & DEBRIDEMENT ORTHO-HIP WOUND ;  Surgeon: Vladimir Lee M.D.;  Location: SURGERY [facility];  Service: Orthopedics    HIP ARTH ANTERIOR TOTAL Right 1/17/2019    Procedure: HIP ARTHROPLASTY ANTERIOR TOTAL;  Surgeon: Juan C Mercedes M.D.;  Location: SURGERY [facility];  Service: Orthopedics    PACEMAKER INSERTION  06/30/2018    Dual Chamber    KNEE ARTHROPLASTY TOTAL Right 6/23/2016    Procedure: KNEE ARTHROPLASTY TOTAL;  Surgeon: Heriberto Lozada M.D.;  Location: SURGERY [facility];  Service:     KNEE ARTHROPLASTY TOTAL Left 5/28/2015    Procedure: KNEE ARTHROPLASTY TOTAL;  Surgeon: Heriberto Lozada M.D.;  Location: SURGERY [facility];  Service:     CATARACT PHACO WITH IOL Right 5/5/2015    Procedure: IOL OD - STANDARD;  Surgeon: Dmitry Bejarano M.D.;  Location: [facility];  Service:     CATARACT PHACO WITH IOL  4/21/2015    Performed by Dmitry Bejarano M.D. at [facility]    RECOVERY  11/30/2010    Performed by SURGERY, CATH-RECOVERY at SURGERY SAME DAY [facility]    COLONOSCOPY  2008    Normal    [facility]    ABDOMINAL HYSTERECTOMY TOTAL  April 15,1975    still has ovaries    OPEN REDUCTION      left ankle    OTHER      Removed pins from left ankle    OTHER CARDIAC SURGERY  12/2017 and 07/19/2018     Cardiac Ablation    TONSILLECTOMY AND ADENOIDECTOMY         FAMILY HISTORY  Family History   Problem Relation Age of Onset    Stroke Mother     Diabetes Father     Stroke Sister     Heart Disease Brother     Stroke Sister     GI Disease Daughter         Crohn's Disease    Heart Disease Daughter         CHF    Other Daughter         Chronic Pain--Lymphedema    Cancer Paternal Aunt         breast       SOCIAL HISTORY   reports that she has never smoked. She has never used smokeless tobacco. She reports that she does not drink alcohol and does not use drugs.    CURRENT  "MEDICATIONS  Previous Medications    ACETAMINOPHEN (TYLENOL) 500 MG TAB    Take 1,000 mg by mouth 3 times a day as needed for Mild Pain. 2 tablets = 1,000 mg.  Indications: Pain    ATENOLOL (TENORMIN) 25 MG TAB    Take 1 Tablet by mouth every day. Indications: High Blood Pressure Disorder    FLUTICASONE-UMECLIDINIUM-VILANTEROL (TRELEGY ELLIPTA) 100-62.5-25 MCG/ACT INHALER    Inhale 1 Puff every day.    FUROSEMIDE (LASIX) 20 MG TAB    Take 1 Tablet by mouth every day.    HOME CARE OXYGEN    Inhale 2 L/min continuous. Oxygen dose range: 2 L/min  Respiratory route via: Nasal Cannula   Oxygen supplier: [employer]      Indications: hypoxia    LEVOTHYROXINE (SYNTHROID) 50 MCG TAB    Take 50 mcg by mouth every morning on an empty stomach. Indications: Underactive Thyroid    LISINOPRIL (PRINIVIL) 5 MG TAB    Take 5 mg by mouth every day. Indications: High Blood Pressure Disorder    MAGNESIUM OXIDE (MAG-OX) 400 MG TAB TABLET    Take 400 mg by mouth every day. Indications: supplement    MIRABEGRON ER (MYRBETRIQ) 50 MG TABLET SR 24 HR    Take 50 mg by mouth every day. Indications: Overactive Bladder    RIVAROXABAN (XARELTO) 20 MG TAB TABLET    Take 20 mg by mouth with dinner. Indications: Atrial Fibrillation with Blood Vessel Occlusion Process, Disease of the Peripheral Arteries    SERTRALINE (ZOLOFT) 50 MG TAB    Take 1 Tablet by mouth every day. Indications: Major Depressive Disorder       ALLERGIES  Allergies   Allergen Reactions    Amiodarone Hives     Throat and tongue itching    Bactrim Shortness of Breath    Cipro Xr Swelling    Metoprolol Swelling     Causes throat swelling    Morphine Unspecified     Hallucinations    Phytoplex Z-Guard [Petrolatum-Zinc Oxide] Unspecified     \"causes burning\"    Pseudoephedrine Palpitations    Qvar [Beclomethasone Dipropionate] Unspecified     Pressure on heart      Vibramycin Shortness of Breath    Atorvastatin Calcium-Polysorbate 80 Unspecified     Muscle aches      Augmentin Unspecified " "    Unknown reaction    Diltiazem Rash     rash    Flecainide Unspecified     dizziness    Keflex Unspecified     Pt states \"Unsure\".    Mucinex Unspecified     GI Distress      Tramadol Unspecified     crying    Atorvastatin Myalgia    Tape Rash     Paper tape okay        PHYSICAL EXAM  /58   Pulse 96   Temp 36.4 °C (97.6 °F) (Temporal)   Resp 18   Ht 1.575 m (5' 2\")   Wt 77 kg (169 lb 12.1 oz)   LMP 01/10/1975   SpO2 98%   BMI 31.05 kg/m²    General: Chronically ill appearing, no acute distress  Head: Normocephalic atraumatic  Eyes: Extraocular motion intact  Neck: Supple, no rigidity  Cardiovascular: Regular rate and rhythm no murmurs rubs or gallops pacemaker in the chest wall  Respiratory: Clear to auscultation bilaterally, equal chest rise and fall, no increased work of breathing  Abdomen: Soft nontender no guarding  Musculoskeletal: Warm and well perfused, no peripheral edema, tenderness to the left hip without obvious deformity  Neuro: Oriented to self and month but not recent events, A&O x1-2, no focal deficits  Integumentary: No wounds or rashes    DIAGNOSTIC STUDIES    Labs:   Labs Reviewed   CBC WITH DIFFERENTIAL - Abnormal; Notable for the following components:       Result Value    WBC 11.8 (*)     Neutrophils-Polys 77.60 (*)     Lymphocytes 13.60 (*)     Neutrophils (Absolute) 9.17 (*)     All other components within normal limits   COMP METABOLIC PANEL - Abnormal; Notable for the following components:    Anion Gap 18.0 (*)     Glucose 141 (*)     Bun 27 (*)     Calcium 10.7 (*)     Alkaline Phosphatase 113 (*)     Globulin 3.6 (*)     All other components within normal limits   TROPONIN - Abnormal; Notable for the following components:    Troponin T 26 (*)     All other components within normal limits   ESTIMATED GFR - Abnormal; Notable for the following components:    GFR (CKD-EPI) 47 (*)     All other components within normal limits   TROPONIN       EKG:   I have independently " interpreted this EKG as detailed above.  Results for orders placed or performed during the hospital encounter of 24   EKG   Result Value Ref Range    Report       [facility] Emergency Dept[facility]    Test Date:  2024  Pt Name:    STEPHANIE DAVENPORT                 Department: ER  MRN:        [id number]                      Room:        08  Gender:     Female                       Technician: [id number]  :        [id number]                   Requested By:ER TRIAGE PROTOCOL  Order #:    [id number]                    Reading MD: Jhon Healy    Measurements  Intervals                                Axis  Rate:       92                           P:          82  VA:         167                          QRS:        -70  QRSD:       129                          T:          79  QT:         417  QTc:        516    Interpretive Statements  AV paced rhythm, left axis deviation, negative modified Sgarbossa criteria  rate 92  Electronically Signed On 2024 00:30:50 PDT by Jhon Healy       *Note: Due to a large number of results and/or encounters for the requested time period, some results have not been displayed. A complete set of results can be found in Results Review.        Radiology:       Radiologist interpretation:   DX-HIP-COMPLETE - UNILATERAL 2+ LEFT   Final Result         1.  No radiographic evidence of acute traumatic injury.   2.  Atherosclerosis      DX-CHEST-PORTABLE (1 VIEW)   Final Result         1.  Bilateral basilar atelectasis, no focal infiltrate   2.  Atherosclerosis           INITIAL ASSESSMENT COURSE AND PLAN  Care Narrative 83-year-old female with dementia, history of CAD, history of  osteoarthritis presenting with atraumatic left hip pain, acute on chronic, as well as atypical chest pain.    9:24 PM - Patient was evaluated at bedside. Patient presents with left hip and chest pain. Patient is a poor historian. Discussed plan to speak with patient's daughter. Patient verbalizes  understanding and support with my plan of care.  Differential diagnoses include but not limited to: atypical ACS, arrhythmia, acute on chronic pain, considered PE but already anticoagulated and history lis ess consistent, considered hip fracture/dislocation    9:47 PM- Spoke with patient's daughter at this time. She reports that her mother does have chronic pain secondary to osteoarthritis. She also has a history of dementia which has worsened since her most recent PNA diagnosis. Discussed that this is new baseline per daughter.    Chest Pain:  Heart score: 5    Despite moderate  heart score, patient has had 2 admissions over the past 60 days for chest pain, and I do not think there will be a benefit to third admission at this time.  Patient is signed out to the oncoming ERP, pending repeat troponin, if it is flat or downtrending I feel the patient can be discharged back to her assisted living facility and follow-up with her primary care doctor.  At this point I suspect her pain may be musculoskeletal in nature.    DISPOSITION AND DISCUSSIONS    I have discussed management of the patient with the following physicians and DANNY's:  None    Discussion of management with other Q or appropriate source(s):  Patient's daughter      Escalation of care considered, and ultimately not performed: acute inpatient care management, however at this time, the patient is most appropriate for outpatient management.    Decision tools and prescription drugs considered including, but not limited to:  Heart score noted above .     The patient will return for new or worsening symptoms and is stable at the time of discharge.    The patient is referred to a primary physician for blood pressure management, diabetic screening, and for all other preventative health concerns.      DISPOSITION:  pending    FOLLOW UP:  PCP    OUTPATIENT MEDICATIONS:  New Prescriptions    LIDOCAINE (LIDODERM) 5 % PATCH    Place 1 Patch on the skin every 24 hours.        FINAL DIAGNOSIS  1. Chest pain, unspecified type    2. Chronic left hip pain    3. Dementia, unspecified dementia severity, unspecified dementia type, unspecified whether behavioral, psychotic, or mood disturbance or anxiety (HCC)         Binta HERNANDEZ (Mandeep), am scribing for, and in the presence of, Jhon Healy M.D..    Electronically signed by: Binta Rosales (Mandeep), 6/12/2024    IJhon M.D. personally performed the services described in this documentation, as scribed by Binta Rosales in my presence, and it is both accurate and complete.      The note accurately reflects work and decisions made by me.  Jhon Healy M.D.  6/13/2024  12:33 AM

## 2024-06-13 NOTE — ED NOTES
The pt is alert and oriented sitting in bed. The pt is tearful and reports anxiety. Vitals stable on the monitor. The pt reports pain, no non-verbal indicators of pain.

## 2024-06-13 NOTE — ED TRIAGE NOTES
"Chief Complaint   Patient presents with    Chest Pain     The pt reports intermittent chest pain that started about an hour ago. Pain is on the left side, 5/10, sharp. The pt coming from [facility]. The pt alert and oriented x4.     Fatigue     The pt reports feeling \"lousy\" starting at 10am     The pt reports pain is radiating up left neck    BIB EMS to 8, pt on monitor and in gown, labs drawn and sent.    Medications given en route:none  "

## 2024-06-13 NOTE — DISCHARGE SUMMARY
"  ED Observation Discharge Summary    Patient:Donte Magaña  Patient : 1940  Patient MRN: [id number]  Patient PCP: Jolie Escobar M.D.    Admit Date: 2024  Discharge Date and Time: 24 11:57 PM  Discharge Diagnosis:   1. Chest pain, unspecified type        2. Chronic left hip pain  lidocaine (LIDODERM) 5 % Patch      3. Dementia, unspecified dementia severity, unspecified dementia type, unspecified whether behavioral, psychotic, or mood disturbance or anxiety ([facility])            Discharge Attending: Tierra Park M.D.  Discharge Service: ED Observation    ED Course  Donte is a 83 y.o. female who was evaluated at [facility] for chest pain.  Patient has multiple previous visits to the ER for similar complaints.  Patient was seen and evaluated by my colleague, Dr. Healy.  He spoke to the patient's daughter who confirmed that this is a chronic issue.  She has had multiple recent admissions for chest pain that have been unremarkable.  Patient is not having any ongoing chest pain on my evaluation.  Her repeat troponin is downtrending and only marginally above her baseline.  I doubt ACS.  Per my discussion with Dr. Healy, ok to discharge home if downtrending.    Discharge Exam:  /55   Pulse 80   Temp 36.4 °C (97.6 °F) (Temporal)   Resp 19   Ht 1.575 m (5' 2\")   Wt 77 kg (169 lb 12.1 oz)   LMP 01/10/1975   SpO2 97%   BMI 31.05 kg/m² .    Constitutional: Awake and alert. Nontoxic  HENT:  Grossly normal  Eyes: Grossly normal  Neck: Normal range of motion  Cardiovascular: Normal heart rate   Thorax & Lungs: No respiratory distress  Abdomen: Nontender  Skin:  No pathologic rash.   Extremities: Well perfused  Psychiatric: Affect normal    Labs  Results for orders placed or performed during the hospital encounter of 24   CBC with Differential   Result Value Ref Range    WBC 11.8 (H) 4.8 - 10.8 K/uL    RBC 5.02 4.20 - 5.40 M/uL    Hemoglobin 15.6 12.0 - 16.0 g/dL    Hematocrit 46.9 37.0 - 47.0 " %    MCV 93.4 81.4 - 97.8 fL    MCH 31.1 27.0 - 33.0 pg    MCHC 33.3 32.2 - 35.5 g/dL    RDW 46.2 35.9 - 50.0 fL    Platelet Count 293 164 - 446 K/uL    MPV 9.9 9.0 - 12.9 fL    Neutrophils-Polys 77.60 (H) 44.00 - 72.00 %    Lymphocytes 13.60 (L) 22.00 - 41.00 %    Monocytes 6.30 0.00 - 13.40 %    Eosinophils 1.30 0.00 - 6.90 %    Basophils 0.60 0.00 - 1.80 %    Immature Granulocytes 0.60 0.00 - 0.90 %    Nucleated RBC 0.00 0.00 - 0.20 /100 WBC    Neutrophils (Absolute) 9.17 (H) 1.82 - 7.42 K/uL    Lymphs (Absolute) 1.61 1.00 - 4.80 K/uL    Monos (Absolute) 0.75 0.00 - 0.85 K/uL    Eos (Absolute) 0.15 0.00 - 0.51 K/uL    Baso (Absolute) 0.07 0.00 - 0.12 K/uL    Immature Granulocytes (abs) 0.07 0.00 - 0.11 K/uL    NRBC (Absolute) 0.00 K/uL   Complete Metabolic Panel (CMP)   Result Value Ref Range    Sodium 142 135 - 145 mmol/L    Potassium 4.4 3.6 - 5.5 mmol/L    Chloride 102 96 - 112 mmol/L    Co2 22 20 - 33 mmol/L    Anion Gap 18.0 (H) 7.0 - 16.0    Glucose 141 (H) 65 - 99 mg/dL    Bun 27 (H) 8 - 22 mg/dL    Creatinine 1.14 0.50 - 1.40 mg/dL    Calcium 10.7 (H) 8.5 - 10.5 mg/dL    Correct Calcium 10.4 8.5 - 10.5 mg/dL    AST(SGOT) 18 12 - 45 U/L    ALT(SGPT) 9 2 - 50 U/L    Alkaline Phosphatase 113 (H) 30 - 99 U/L    Total Bilirubin 0.8 0.1 - 1.5 mg/dL    Albumin 4.4 3.2 - 4.9 g/dL    Total Protein 8.0 6.0 - 8.2 g/dL    Globulin 3.6 (H) 1.9 - 3.5 g/dL    A-G Ratio 1.2 g/dL   Troponins NOW   Result Value Ref Range    Troponin T 26 (H) 6 - 19 ng/L   ESTIMATED GFR   Result Value Ref Range    GFR (CKD-EPI) 47 (A) >60 mL/min/1.73 m 2   EKG   Result Value Ref Range    Report       [facility] Emergency Dept.    Test Date:  2024  Pt Name:    STEPHANIE DAVENPORT                 Department: ER  MRN:        [id number]                      Room:       [facility]  Gender:     Female                       Technician: [id number]  :        1940                   Requested By:ER TRIAGE PROTOCOL  Order #:    [id number]                     Reading MD:    Measurements  Intervals                                Axis  Rate:       92                           P:          82  DE:         167                          QRS:        -70  QRSD:       129                          T:          79  QT:         417  QTc:        516    Interpretive Statements  A-V dual-paced rhythm with some inhibition  No further analysis attempted due to paced rhythm  Compared to ECG 05/19/2024 19:08:08  AV dual-paced complex(es) or rhythm no longer present       *Note: Due to a large number of results and/or encounters for the requested time period, some results have not been displayed. A complete set of results can be found in Results Review.       Radiology  DX-HIP-COMPLETE - UNILATERAL 2+ LEFT   Final Result         1.  No radiographic evidence of acute traumatic injury.   2.  Atherosclerosis      DX-CHEST-PORTABLE (1 VIEW)   Final Result         1.  Bilateral basilar atelectasis, no focal infiltrate   2.  Atherosclerosis          Medications:   New Prescriptions    No medications on file     Electronically signed by: Tierra Park M.D., 6/12/2024 11:57 PM

## 2024-06-13 NOTE — ED NOTES
Bedside report received from off going RN: Fran, assumed care of patient.  POC discussed with patient. Call light within reach, all needs addressed at this time.       Fall risk interventions in place: Patient's personal possessions are with in their safe reach and Keep floor surfaces clean and dry (all applicable per [place] Fall risk assessment)   Continuous monitoring: Cardiac Leads, Pulse Ox, or Blood Pressure  IVF/IV medications: Infusion per MAR (List Med(s)) LR bolus  Oxygen: Room Air  Bedside sitter: Not Applicable   Isolation: Not Applicable

## 2024-06-13 NOTE — CASE COMMUNICATION
noted  ----- Message -----  From: Silke Roth, OT  Sent: 6/12/2024   1:01 PM PDT  To: Chela Mohamud R.N.; Sneha Medina R.N.; *      OCCUPATIONAL THERAPY REASSESSMENT AND PLAN OF CARE     ·       Patient:  Donte Magaña            Recommend skilled HHOT to address deficits and improve function-report sent to MD     ·       Frequency:   1W1, effective 6/12/24     ·       Goals: NA Client refused continued OT.  States she has en ough therapy with PT     Does the patient get SOB with Minimum exertion?  unknown.  Refused to participate in most activities    How often (if at all) does pain interfere with patient's movements?  occasionally

## 2024-06-13 NOTE — DISCHARGE PLANNING
Medical Social Work    SW received a call from the RN requesting SW assistance with [facility] transport back to [place] [facility]. MATY faxed a PCS to [facility] and set up transport with [facility] for 0245. RN aware of transport time.

## 2024-06-13 NOTE — CASE COMMUNICATION
noted  ----- Message -----  From: Silke Roth OT  Sent: 6/12/2024  12:47 PM PDT  To: Chela Mohamud R.N.; Sneha Medina R.N.; *      OT attempted to conduct initial evaluation 6/12/24.  Client was uncoorporative, refusing to get off couch.  She states she doesn't want any more therapy since she is already getting PT.  OT will be evaluation only.

## 2024-06-13 NOTE — DISCHARGE INSTRUCTIONS
Follow-up with your primary care doctor to discuss your chest pain further, discussed additional workup as warranted.  Return to the emergency department for new concerning symptoms.

## 2024-06-14 ENCOUNTER — APPOINTMENT (OUTPATIENT)
Dept: RADIOLOGY | Facility: MEDICAL CENTER | Age: 84
End: 2024-06-14
Attending: EMERGENCY MEDICINE
Payer: MEDICARE

## 2024-06-14 ENCOUNTER — HOSPITAL ENCOUNTER (EMERGENCY)
Facility: MEDICAL CENTER | Age: 84
End: 2024-06-14
Attending: EMERGENCY MEDICINE
Payer: MEDICARE

## 2024-06-14 VITALS
HEART RATE: 84 BPM | WEIGHT: 170 LBS | DIASTOLIC BLOOD PRESSURE: 70 MMHG | BODY MASS INDEX: 31.28 KG/M2 | RESPIRATION RATE: 15 BRPM | OXYGEN SATURATION: 95 % | TEMPERATURE: 98 F | HEIGHT: 62 IN | SYSTOLIC BLOOD PRESSURE: 135 MMHG

## 2024-06-14 DIAGNOSIS — M25.552 CHRONIC LEFT HIP PAIN: ICD-10-CM

## 2024-06-14 DIAGNOSIS — G89.29 CHRONIC LEFT HIP PAIN: ICD-10-CM

## 2024-06-14 LAB
ALBUMIN SERPL BCP-MCNC: 3.8 G/DL (ref 3.2–4.9)
ALBUMIN/GLOB SERPL: 1.2 G/DL
ALP SERPL-CCNC: 97 U/L (ref 30–99)
ALT SERPL-CCNC: 9 U/L (ref 2–50)
ANION GAP SERPL CALC-SCNC: 17 MMOL/L (ref 7–16)
AST SERPL-CCNC: 19 U/L (ref 12–45)
BASOPHILS # BLD AUTO: 0.9 % (ref 0–1.8)
BASOPHILS # BLD: 0.07 K/UL (ref 0–0.12)
BILIRUB SERPL-MCNC: 0.6 MG/DL (ref 0.1–1.5)
BUN SERPL-MCNC: 29 MG/DL (ref 8–22)
CALCIUM ALBUM COR SERPL-MCNC: 9.9 MG/DL (ref 8.5–10.5)
CALCIUM SERPL-MCNC: 9.7 MG/DL (ref 8.5–10.5)
CHLORIDE SERPL-SCNC: 102 MMOL/L (ref 96–112)
CO2 SERPL-SCNC: 22 MMOL/L (ref 20–33)
CREAT SERPL-MCNC: 1.16 MG/DL (ref 0.5–1.4)
EOSINOPHIL # BLD AUTO: 0.57 K/UL (ref 0–0.51)
EOSINOPHIL NFR BLD: 7.5 % (ref 0–6.9)
ERYTHROCYTE [DISTWIDTH] IN BLOOD BY AUTOMATED COUNT: 47.2 FL (ref 35.9–50)
GFR SERPLBLD CREATININE-BSD FMLA CKD-EPI: 46 ML/MIN/1.73 M 2
GLOBULIN SER CALC-MCNC: 3.2 G/DL (ref 1.9–3.5)
GLUCOSE SERPL-MCNC: 108 MG/DL (ref 65–99)
HCT VFR BLD AUTO: 43.1 % (ref 37–47)
HGB BLD-MCNC: 13.9 G/DL (ref 12–16)
IMM GRANULOCYTES # BLD AUTO: 0.03 K/UL (ref 0–0.11)
IMM GRANULOCYTES NFR BLD AUTO: 0.4 % (ref 0–0.9)
LYMPHOCYTES # BLD AUTO: 1.8 K/UL (ref 1–4.8)
LYMPHOCYTES NFR BLD: 23.5 % (ref 22–41)
MCH RBC QN AUTO: 30.8 PG (ref 27–33)
MCHC RBC AUTO-ENTMCNC: 32.3 G/DL (ref 32.2–35.5)
MCV RBC AUTO: 95.6 FL (ref 81.4–97.8)
MONOCYTES # BLD AUTO: 0.55 K/UL (ref 0–0.85)
MONOCYTES NFR BLD AUTO: 7.2 % (ref 0–13.4)
NEUTROPHILS # BLD AUTO: 4.63 K/UL (ref 1.82–7.42)
NEUTROPHILS NFR BLD: 60.5 % (ref 44–72)
NRBC # BLD AUTO: 0 K/UL
NRBC BLD-RTO: 0 /100 WBC (ref 0–0.2)
PLATELET # BLD AUTO: 249 K/UL (ref 164–446)
PMV BLD AUTO: 10.4 FL (ref 9–12.9)
POTASSIUM SERPL-SCNC: 4.6 MMOL/L (ref 3.6–5.5)
PROT SERPL-MCNC: 7 G/DL (ref 6–8.2)
RBC # BLD AUTO: 4.51 M/UL (ref 4.2–5.4)
SODIUM SERPL-SCNC: 141 MMOL/L (ref 135–145)
WBC # BLD AUTO: 7.7 K/UL (ref 4.8–10.8)

## 2024-06-14 PROCEDURE — 80053 COMPREHEN METABOLIC PANEL: CPT

## 2024-06-14 PROCEDURE — 73501 X-RAY EXAM HIP UNI 1 VIEW: CPT | Mod: LT

## 2024-06-14 PROCEDURE — 85025 COMPLETE CBC W/AUTO DIFF WBC: CPT

## 2024-06-14 PROCEDURE — 36415 COLL VENOUS BLD VENIPUNCTURE: CPT

## 2024-06-14 PROCEDURE — 96365 THER/PROPH/DIAG IV INF INIT: CPT

## 2024-06-14 PROCEDURE — A9270 NON-COVERED ITEM OR SERVICE: HCPCS | Performed by: EMERGENCY MEDICINE

## 2024-06-14 PROCEDURE — 73700 CT LOWER EXTREMITY W/O DYE: CPT | Mod: LT

## 2024-06-14 PROCEDURE — 99285 EMERGENCY DEPT VISIT HI MDM: CPT

## 2024-06-14 PROCEDURE — 700102 HCHG RX REV CODE 250 W/ 637 OVERRIDE(OP): Performed by: EMERGENCY MEDICINE

## 2024-06-14 RX ORDER — IBUPROFEN 600 MG/1
600 TABLET ORAL ONCE
Status: COMPLETED | OUTPATIENT
Start: 2024-06-14 | End: 2024-06-14

## 2024-06-14 RX ADMIN — IBUPROFEN 600 MG: 600 TABLET, FILM COATED ORAL at 14:33

## 2024-06-14 ASSESSMENT — FIBROSIS 4 INDEX: FIB4 SCORE: 1.74

## 2024-06-14 NOTE — DISCHARGE INSTRUCTIONS
Tylenol as needed for pain.  Local lidocaine patches in addition of this.    You should follow-up with orthopedics for further assessment.

## 2024-06-14 NOTE — ED PROVIDER NOTES
"ED PHYSICIAN NOTE    CHIEF COMPLAINT  Chief Complaint   Patient presents with    Hip Pain     Pt reports chronic L hip pain d/t being bone on bone and has been denied a hip replacement. Pt states she only has Tylenol at home and is not providing relief       EXTERNAL RECORDS REVIEWED  Home care encounter yesterday.  Seen by PT.  Recommended pain management.    HPI/ROS  LIMITATION TO HISTORY   Dementia      Donte Magaña is a 83 y.o. female who presents left hip pain.  She says she has had this for a long time but she has a bad memory and does not know exactly when it started.  The pain is worse with movement or when she tries to ambulate.  She has tried taking Tylenol without relief.    PAST MEDICAL HISTORY  Past Medical History:   Diagnosis Date    A-fib ([facility])     Anesthesia     \"Tachycardia for 5 days after cataract surgery\"    Anticoagulant long-term use 1/12/2012    Arthritis     osteo-Knees, hips    Atrial fibrillation (HCC)     Backpain     R hip    Bowel habit changes     diarrhea    Breath shortness     with exertion, prn O2 2L    Bronchitis Nov, 2013    CAD (coronary artery disease)     Depression     Glaucoma 5/3/2011    Hematoma complicating a procedure 11/3/2012    Hemorrhagic disorder ([facility])     bruising/coumadin    Hypertension     Hypothyroid     Lupus (HCC)     Macular degeneration     Menopause 1/12/2012    Mitral regurgitation 10/30/2012    Obesity 1/12/2012    Pacemaker 2018    Pneumonia feb,2013    Pre-syncope 6/29/2018    Pulmonary hypertension (HCC) 10/30/2012    PVC's (premature ventricular contractions) 1/12/2012    Senile nuclear sclerosis     Spinal stenosis of lumbar region at multiple levels     Unspecified cataract     repaired bilateral    Unspecified urinary incontinence     Urinary bladder disorder        SOCIAL HISTORY  Social History     Tobacco Use    Smoking status: Never    Smokeless tobacco: Never   Vaping Use    Vaping status: Never Used   Substance Use Topics    Alcohol use: " "No    Drug use: No       CURRENT MEDICATIONS  Home Medications       Reviewed by Bere Neely R.N. (Registered Nurse) on 06/14/24 at 1356  Med List Status: Not Addressed     Medication Last Dose Status   acetaminophen (TYLENOL) 500 MG Tab  Active   atenolol (TENORMIN) 25 MG Tab  Active   fluticasone-umeclidinium-vilanterol (TRELEGY ELLIPTA) 100-62.5-25 mcg/act inhaler  Active   furosemide (LASIX) 20 MG Tab  Active   Home Care Oxygen  Active   levothyroxine (SYNTHROID) 50 MCG Tab  Active   lidocaine (LIDODERM) 5 % Patch  Active   lisinopril (PRINIVIL) 5 MG Tab  Active   magnesium oxide (MAG-OX) 400 MG Tab tablet  Active   Mirabegron ER (MYRBETRIQ) 50 MG TABLET SR 24 HR  Active   rivaroxaban (XARELTO) 20 MG Tab tablet  Active   sertraline (ZOLOFT) 50 MG Tab  Active                  Audit from Redirected Encounters    **Home medications have not yet been reviewed for this encounter**         ALLERGIES  Allergies   Allergen Reactions    Amiodarone Hives     Throat and tongue itching    Bactrim Shortness of Breath    Cipro Xr Swelling    Metoprolol Swelling     Causes throat swelling    Morphine Unspecified     Hallucinations    Phytoplex Z-Guard [Petrolatum-Zinc Oxide] Unspecified     \"causes burning\"    Pseudoephedrine Palpitations    Qvar [Beclomethasone Dipropionate] Unspecified     Pressure on heart      Vibramycin Shortness of Breath    Atorvastatin Calcium-Polysorbate 80 Unspecified     Muscle aches      Augmentin Unspecified     Unknown reaction    Diltiazem Rash     rash    Flecainide Unspecified     dizziness    Keflex Unspecified     Pt states \"Unsure\".    Mucinex Unspecified     GI Distress      Tramadol Unspecified     crying    Atorvastatin Myalgia    Tape Rash     Paper tape okay       PHYSICAL EXAM  VITAL SIGNS: /76   Pulse 91   Temp 36.7 °C (98.1 °F) (Temporal)   Resp 15   Ht 1.575 m (5' 2\")   Wt 77.1 kg (170 lb)   LMP 01/10/1975   SpO2 97%   BMI 31.09 kg/m²    Constitutional: Awake and " alert  HENT: Normal inspection  Eyes: Normal inspection  Neck: Grossly normal range of motion.  Cardiovascular: Normal heart rate, Normal rhythm.  Symmetric peripheral pulses.   Thorax & Lungs: No respiratory distress, No wheezing, No rales, No rhonchi, No chest tenderness.   Abdomen: Bowel sounds normal, soft, non-distended, nontender, no mass  Skin: No obvious rash.  Back: No tenderness, No CVA tenderness.   Extremities: Mild pain in the left gluteus on palpation.  Normal range of motion of the hips.  Pelvis stable.  No swelling.  No redness warmth.  Neurologic: Grossly normal   Psychiatric: Normal for situation     DIAGNOSTIC STUDIES / PROCEDURES  LABS/EKG  Results for orders placed or performed during the hospital encounter of 06/14/24   CBC WITH DIFFERENTIAL   Result Value Ref Range    WBC 7.7 4.8 - 10.8 K/uL    RBC 4.51 4.20 - 5.40 M/uL    Hemoglobin 13.9 12.0 - 16.0 g/dL    Hematocrit 43.1 37.0 - 47.0 %    MCV 95.6 81.4 - 97.8 fL    MCH 30.8 27.0 - 33.0 pg    MCHC 32.3 32.2 - 35.5 g/dL    RDW 47.2 35.9 - 50.0 fL    Platelet Count 249 164 - 446 K/uL    MPV 10.4 9.0 - 12.9 fL    Neutrophils-Polys 60.50 44.00 - 72.00 %    Lymphocytes 23.50 22.00 - 41.00 %    Monocytes 7.20 0.00 - 13.40 %    Eosinophils 7.50 (H) 0.00 - 6.90 %    Basophils 0.90 0.00 - 1.80 %    Immature Granulocytes 0.40 0.00 - 0.90 %    Nucleated RBC 0.00 0.00 - 0.20 /100 WBC    Neutrophils (Absolute) 4.63 1.82 - 7.42 K/uL    Lymphs (Absolute) 1.80 1.00 - 4.80 K/uL    Monos (Absolute) 0.55 0.00 - 0.85 K/uL    Eos (Absolute) 0.57 (H) 0.00 - 0.51 K/uL    Baso (Absolute) 0.07 0.00 - 0.12 K/uL    Immature Granulocytes (abs) 0.03 0.00 - 0.11 K/uL    NRBC (Absolute) 0.00 K/uL   Comp Metabolic Panel   Result Value Ref Range    Sodium 141 135 - 145 mmol/L    Potassium 4.6 3.6 - 5.5 mmol/L    Chloride 102 96 - 112 mmol/L    Co2 22 20 - 33 mmol/L    Anion Gap 17.0 (H) 7.0 - 16.0    Glucose 108 (H) 65 - 99 mg/dL    Bun 29 (H) 8 - 22 mg/dL    Creatinine 1.16  0.50 - 1.40 mg/dL    Calcium 9.7 8.5 - 10.5 mg/dL    Correct Calcium 9.9 8.5 - 10.5 mg/dL    AST(SGOT) 19 12 - 45 U/L    ALT(SGPT) 9 2 - 50 U/L    Alkaline Phosphatase 97 30 - 99 U/L    Total Bilirubin 0.6 0.1 - 1.5 mg/dL    Albumin 3.8 3.2 - 4.9 g/dL    Total Protein 7.0 6.0 - 8.2 g/dL    Globulin 3.2 1.9 - 3.5 g/dL    A-G Ratio 1.2 g/dL   ESTIMATED GFR   Result Value Ref Range    GFR (CKD-EPI) 46 (A) >60 mL/min/1.73 m 2     *Note: Due to a large number of results and/or encounters for the requested time period, some results have not been displayed. A complete set of results can be found in Results Review.        RADIOLOGY  I have independently interpreted the diagnostic imaging associated with this visit and am waiting the final reading from the radiologist.   My preliminary interpretation is as follows: CT hip without fracture    Radiologist interpretation:   DX-HIP-UNILATERAL-WITH PELVIS-1 VIEW LEFT   Final Result         1.  No radiographic evidence of acute traumatic injury.      2.  Osteoporosis.      3.  Previous right total hip replacement.      CT-HIP W/O PLUS RECONS LEFT    (Results Pending)         COURSE & MEDICAL DECISION MAKING    INITIAL ASSESSMENT, COURSE AND PLAN  Care Narrative: Patient presents with chronic left hip pain.  Afebrile.  No redness warmth or swelling to suggest infection.  Obtain diagnostic imaging.  Will obtain screening labs.  Ibuprofen given for pain.    Laboratory data returned unremarkable.  X-ray negative.  Obtain CT pelvis.  CT does not show fracture.    At this point etiology of pain is felt most likely to be related to osteoarthritis.  Radiculopathy is within the differential.  She is neurologically intact.  No sign of infection, fracture or other emergency condition.  Management is symptomatic.  She should follow-up with orthopedics.  Patient is anticoagulated on Xarelto.  Cannot take regular NSAIDs.  Of advised Tylenol as well as lidocaine patches.  She is given referral  information for orthopedics.  We called the patient's daughter to involve her with patient's care plan, but there was no answer.  Patient was precautioned to return to the ER for fever, uncontrolled pain, swelling of the extremity, abdominal pain or concern      DISPOSITION AND DISCUSSIONS      Escalation of care considered, and ultimately not performed:acute inpatient care management, however at this time, the patient is most appropriate for outpatient management        Prescription drugs considered and/or prescribed:   Considered opiate prescription, but nonnarcotic analgesic is most appropriate    FINAL IMPRESSION  1.  Left hip pain, chronic    This dictation was created using voice recognition software. The accuracy of the dictation is limited to the abilities of the software. I expect there may be some errors of grammar and possibly content. The nursing notes were reviewed and certain aspects of this information were incorporated into this note.    Electronically signed by: Choco Hastings M.D., 6/14/2024

## 2024-06-14 NOTE — ED TRIAGE NOTES
Chief Complaint   Patient presents with    Hip Pain     Pt reports chronic L hip pain d/t being bone on bone and has been denied a hip replacement. Pt states she only has Tylenol at home and is not providing relief       BIB [facility] to [facility], pt on monitor and in gown, labs drawn and sent.   Pt Reports chronic L hip pain that is not being relieved by Tylenol anymore. Per EMS, pt was becoming verbally abusive and crying d/t frustration with pain  Pt arrives [facility] 15, laying on R side for pain relief. Pt is calm and cooperative on arrival    Medications given en route:25 mcg fentanyl, 0.5 mg versed, 4 mg zofran    Vitals:    06/14/24 1354   BP: 115/76   Pulse: 85   Resp: 15   Temp: 36.7 °C (98.1 °F)   SpO2: 92%

## 2024-06-15 ENCOUNTER — HOME CARE VISIT (OUTPATIENT)
Dept: HOME HEALTH SERVICES | Facility: HOME HEALTHCARE | Age: 84
End: 2024-06-15
Payer: MEDICARE

## 2024-06-15 NOTE — DISCHARGE PLANNING
ER  Note:  Per bedside RN, pt is medically cleared to be discharged back to [facility], pt's family unable to provide transportation.     RN CM called [facility] ([phone]), they confirmed pt is a resident, confirmed [facility] address, no transportation available, call transferred to ER RN per request.    Pt has [facility] insurance. RN CM called T, Approved Service $137.76, trip # [id number], transportation ETA [id number]. ER RN and [place] updated.     [facility] address:  [address] [place] [address]

## 2024-06-15 NOTE — ED NOTES
Pt discharged with [facility] via w/c to [facility]. [facility] 15. IV discontinued and gauze placed, pt in possession of belongings. Pt provided discharge education and information pertaining to medications and follow up appointments. Pt received copy of discharge instructions and verbalized understanding.     Vitals:    06/14/24 1724   BP: 135/70   Pulse: 84   Resp: 15   Temp: 36.7 °C (98 °F)   SpO2: 95%

## 2024-06-18 ENCOUNTER — TELEPHONE (OUTPATIENT)
Dept: MEDICAL GROUP | Facility: MEDICAL CENTER | Age: 84
End: 2024-06-18

## 2024-06-18 ENCOUNTER — APPOINTMENT (OUTPATIENT)
Dept: RADIOLOGY | Facility: MEDICAL CENTER | Age: 84
DRG: 552 | End: 2024-06-18
Attending: EMERGENCY MEDICINE
Payer: MEDICARE

## 2024-06-18 ENCOUNTER — HOME CARE VISIT (OUTPATIENT)
Dept: HOME HEALTH SERVICES | Facility: HOME HEALTHCARE | Age: 84
End: 2024-06-18
Payer: MEDICARE

## 2024-06-18 ENCOUNTER — HOSPITAL ENCOUNTER (INPATIENT)
Facility: MEDICAL CENTER | Age: 84
LOS: 4 days | DRG: 552 | End: 2024-06-22
Attending: EMERGENCY MEDICINE | Admitting: HOSPITALIST
Payer: MEDICARE

## 2024-06-18 VITALS
HEART RATE: 78 BPM | OXYGEN SATURATION: 97 % | TEMPERATURE: 97.9 F | DIASTOLIC BLOOD PRESSURE: 70 MMHG | SYSTOLIC BLOOD PRESSURE: 120 MMHG | RESPIRATION RATE: 16 BRPM

## 2024-06-18 DIAGNOSIS — M54.40 BACK PAIN OF LUMBAR REGION WITH SCIATICA: ICD-10-CM

## 2024-06-18 DIAGNOSIS — M25.552 PAIN OF LEFT HIP: ICD-10-CM

## 2024-06-18 DIAGNOSIS — N17.9 AKI (ACUTE KIDNEY INJURY) (HCC): ICD-10-CM

## 2024-06-18 PROBLEM — N18.9 ACUTE ON CHRONIC RENAL FAILURE (HCC): Status: ACTIVE | Noted: 2024-05-04

## 2024-06-18 PROBLEM — I50.32 CHRONIC DIASTOLIC HEART FAILURE (HCC): Status: ACTIVE | Noted: 2024-06-18

## 2024-06-18 LAB
ALBUMIN SERPL BCP-MCNC: 4 G/DL (ref 3.2–4.9)
ALBUMIN/GLOB SERPL: 1.5 G/DL
ALP SERPL-CCNC: 90 U/L (ref 30–99)
ALT SERPL-CCNC: 11 U/L (ref 2–50)
AMORPH CRY #/AREA URNS HPF: PRESENT /HPF
ANION GAP SERPL CALC-SCNC: 15 MMOL/L (ref 7–16)
APPEARANCE UR: CLEAR
AST SERPL-CCNC: 21 U/L (ref 12–45)
BACTERIA #/AREA URNS HPF: ABNORMAL /HPF
BASOPHILS # BLD AUTO: 0.7 % (ref 0–1.8)
BASOPHILS # BLD: 0.06 K/UL (ref 0–0.12)
BILIRUB SERPL-MCNC: 0.6 MG/DL (ref 0.1–1.5)
BILIRUB UR QL STRIP.AUTO: NEGATIVE
BUN SERPL-MCNC: 40 MG/DL (ref 8–22)
CALCIUM ALBUM COR SERPL-MCNC: 9.8 MG/DL (ref 8.5–10.5)
CALCIUM SERPL-MCNC: 9.8 MG/DL (ref 8.5–10.5)
CHLORIDE SERPL-SCNC: 99 MMOL/L (ref 96–112)
CO2 SERPL-SCNC: 23 MMOL/L (ref 20–33)
COLOR UR: YELLOW
CREAT SERPL-MCNC: 1.7 MG/DL (ref 0.5–1.4)
EKG IMPRESSION: NORMAL
EOSINOPHIL # BLD AUTO: 0.39 K/UL (ref 0–0.51)
EOSINOPHIL NFR BLD: 4.8 % (ref 0–6.9)
EPI CELLS #/AREA URNS HPF: ABNORMAL /HPF
ERYTHROCYTE [DISTWIDTH] IN BLOOD BY AUTOMATED COUNT: 46.7 FL (ref 35.9–50)
GFR SERPLBLD CREATININE-BSD FMLA CKD-EPI: 29 ML/MIN/1.73 M 2
GLOBULIN SER CALC-MCNC: 2.7 G/DL (ref 1.9–3.5)
GLUCOSE SERPL-MCNC: 123 MG/DL (ref 65–99)
GLUCOSE UR STRIP.AUTO-MCNC: NEGATIVE MG/DL
HCT VFR BLD AUTO: 42.5 % (ref 37–47)
HGB BLD-MCNC: 14.2 G/DL (ref 12–16)
HYALINE CASTS #/AREA URNS LPF: ABNORMAL /LPF
IMM GRANULOCYTES # BLD AUTO: 0.05 K/UL (ref 0–0.11)
IMM GRANULOCYTES NFR BLD AUTO: 0.6 % (ref 0–0.9)
KETONES UR STRIP.AUTO-MCNC: ABNORMAL MG/DL
LEUKOCYTE ESTERASE UR QL STRIP.AUTO: ABNORMAL
LYMPHOCYTES # BLD AUTO: 2.17 K/UL (ref 1–4.8)
LYMPHOCYTES NFR BLD: 26.7 % (ref 22–41)
MCH RBC QN AUTO: 31.5 PG (ref 27–33)
MCHC RBC AUTO-ENTMCNC: 33.4 G/DL (ref 32.2–35.5)
MCV RBC AUTO: 94.2 FL (ref 81.4–97.8)
MICRO URNS: ABNORMAL
MONOCYTES # BLD AUTO: 0.63 K/UL (ref 0–0.85)
MONOCYTES NFR BLD AUTO: 7.7 % (ref 0–13.4)
MUCOUS THREADS #/AREA URNS HPF: ABNORMAL /HPF
NEUTROPHILS # BLD AUTO: 4.83 K/UL (ref 1.82–7.42)
NEUTROPHILS NFR BLD: 59.5 % (ref 44–72)
NITRITE UR QL STRIP.AUTO: NEGATIVE
NRBC # BLD AUTO: 0 K/UL
NRBC BLD-RTO: 0 /100 WBC (ref 0–0.2)
PH UR STRIP.AUTO: 5 [PH] (ref 5–8)
PLATELET # BLD AUTO: 240 K/UL (ref 164–446)
PMV BLD AUTO: 9.8 FL (ref 9–12.9)
POTASSIUM SERPL-SCNC: 4.1 MMOL/L (ref 3.6–5.5)
PROT SERPL-MCNC: 6.7 G/DL (ref 6–8.2)
PROT UR QL STRIP: NEGATIVE MG/DL
RBC # BLD AUTO: 4.51 M/UL (ref 4.2–5.4)
RBC # URNS HPF: ABNORMAL /HPF
RBC UR QL AUTO: ABNORMAL
SODIUM SERPL-SCNC: 137 MMOL/L (ref 135–145)
SP GR UR STRIP.AUTO: 1.02
TROPONIN T SERPL-MCNC: 22 NG/L (ref 6–19)
UROBILINOGEN UR STRIP.AUTO-MCNC: 0.2 MG/DL
WBC # BLD AUTO: 8.1 K/UL (ref 4.8–10.8)
WBC #/AREA URNS HPF: ABNORMAL /HPF
YEAST BUDDING URNS QL: PRESENT /HPF

## 2024-06-18 PROCEDURE — 99285 EMERGENCY DEPT VISIT HI MDM: CPT

## 2024-06-18 PROCEDURE — 36415 COLL VENOUS BLD VENIPUNCTURE: CPT

## 2024-06-18 PROCEDURE — 85025 COMPLETE CBC W/AUTO DIFF WBC: CPT

## 2024-06-18 PROCEDURE — 99223 1ST HOSP IP/OBS HIGH 75: CPT | Mod: AI | Performed by: HOSPITALIST

## 2024-06-18 PROCEDURE — 700102 HCHG RX REV CODE 250 W/ 637 OVERRIDE(OP): Performed by: EMERGENCY MEDICINE

## 2024-06-18 PROCEDURE — G0299 HHS/HOSPICE OF RN EA 15 MIN: HCPCS

## 2024-06-18 PROCEDURE — 72131 CT LUMBAR SPINE W/O DYE: CPT

## 2024-06-18 PROCEDURE — 84484 ASSAY OF TROPONIN QUANT: CPT

## 2024-06-18 PROCEDURE — 96374 THER/PROPH/DIAG INJ IV PUSH: CPT

## 2024-06-18 PROCEDURE — 86140 C-REACTIVE PROTEIN: CPT

## 2024-06-18 PROCEDURE — 80053 COMPREHEN METABOLIC PANEL: CPT

## 2024-06-18 PROCEDURE — 770006 HCHG ROOM/CARE - MED/SURG/GYN SEMI*

## 2024-06-18 PROCEDURE — 700101 HCHG RX REV CODE 250: Performed by: EMERGENCY MEDICINE

## 2024-06-18 PROCEDURE — 93005 ELECTROCARDIOGRAM TRACING: CPT | Performed by: EMERGENCY MEDICINE

## 2024-06-18 PROCEDURE — 85652 RBC SED RATE AUTOMATED: CPT

## 2024-06-18 PROCEDURE — 700105 HCHG RX REV CODE 258: Performed by: EMERGENCY MEDICINE

## 2024-06-18 PROCEDURE — 70450 CT HEAD/BRAIN W/O DYE: CPT

## 2024-06-18 PROCEDURE — 81001 URINALYSIS AUTO W/SCOPE: CPT

## 2024-06-18 PROCEDURE — 71045 X-RAY EXAM CHEST 1 VIEW: CPT

## 2024-06-18 PROCEDURE — A9270 NON-COVERED ITEM OR SERVICE: HCPCS | Performed by: EMERGENCY MEDICINE

## 2024-06-18 PROCEDURE — 700111 HCHG RX REV CODE 636 W/ 250 OVERRIDE (IP): Performed by: HOSPITALIST

## 2024-06-18 RX ORDER — OXYCODONE HYDROCHLORIDE 5 MG/1
5 TABLET ORAL
Status: DISCONTINUED | OUTPATIENT
Start: 2024-06-18 | End: 2024-06-19

## 2024-06-18 RX ORDER — LEVOTHYROXINE SODIUM 0.05 MG/1
50 TABLET ORAL
Status: DISCONTINUED | OUTPATIENT
Start: 2024-06-19 | End: 2024-06-22 | Stop reason: HOSPADM

## 2024-06-18 RX ORDER — SODIUM CHLORIDE, SODIUM LACTATE, POTASSIUM CHLORIDE, CALCIUM CHLORIDE 600; 310; 30; 20 MG/100ML; MG/100ML; MG/100ML; MG/100ML
1000 INJECTION, SOLUTION INTRAVENOUS ONCE
Status: COMPLETED | OUTPATIENT
Start: 2024-06-18 | End: 2024-06-18

## 2024-06-18 RX ORDER — OXYCODONE HYDROCHLORIDE 10 MG/1
10 TABLET ORAL
Status: DISCONTINUED | OUTPATIENT
Start: 2024-06-18 | End: 2024-06-19

## 2024-06-18 RX ORDER — ACETAMINOPHEN 325 MG/1
650 TABLET ORAL EVERY 6 HOURS PRN
Status: DISCONTINUED | OUTPATIENT
Start: 2024-06-18 | End: 2024-06-22 | Stop reason: HOSPADM

## 2024-06-18 RX ORDER — LIDOCAINE 4 G/G
1 PATCH TOPICAL EVERY 24 HOURS
Status: DISCONTINUED | OUTPATIENT
Start: 2024-06-18 | End: 2024-06-22 | Stop reason: HOSPADM

## 2024-06-18 RX ORDER — HYDROMORPHONE HYDROCHLORIDE 1 MG/ML
1 INJECTION, SOLUTION INTRAMUSCULAR; INTRAVENOUS; SUBCUTANEOUS ONCE
Status: COMPLETED | OUTPATIENT
Start: 2024-06-18 | End: 2024-06-18

## 2024-06-18 RX ORDER — HYDROCODONE BITARTRATE AND ACETAMINOPHEN 5; 325 MG/1; MG/1
1 TABLET ORAL ONCE
Status: COMPLETED | OUTPATIENT
Start: 2024-06-18 | End: 2024-06-18

## 2024-06-18 RX ORDER — SODIUM CHLORIDE, SODIUM LACTATE, POTASSIUM CHLORIDE, CALCIUM CHLORIDE 600; 310; 30; 20 MG/100ML; MG/100ML; MG/100ML; MG/100ML
INJECTION, SOLUTION INTRAVENOUS CONTINUOUS
Status: DISCONTINUED | OUTPATIENT
Start: 2024-06-18 | End: 2024-06-20

## 2024-06-18 RX ORDER — ONDANSETRON 2 MG/ML
4 INJECTION INTRAMUSCULAR; INTRAVENOUS EVERY 4 HOURS PRN
Status: DISCONTINUED | OUTPATIENT
Start: 2024-06-18 | End: 2024-06-22 | Stop reason: HOSPADM

## 2024-06-18 RX ORDER — LABETALOL HYDROCHLORIDE 5 MG/ML
10 INJECTION, SOLUTION INTRAVENOUS EVERY 4 HOURS PRN
Status: DISCONTINUED | OUTPATIENT
Start: 2024-06-18 | End: 2024-06-22 | Stop reason: HOSPADM

## 2024-06-18 RX ORDER — HYDROMORPHONE HYDROCHLORIDE 1 MG/ML
0.5 INJECTION, SOLUTION INTRAMUSCULAR; INTRAVENOUS; SUBCUTANEOUS
Status: DISCONTINUED | OUTPATIENT
Start: 2024-06-18 | End: 2024-06-19

## 2024-06-18 RX ORDER — FUROSEMIDE 40 MG/1
20 TABLET ORAL DAILY
Status: DISCONTINUED | OUTPATIENT
Start: 2024-06-19 | End: 2024-06-18

## 2024-06-18 RX ORDER — ATENOLOL 50 MG/1
25 TABLET ORAL DAILY
Status: DISCONTINUED | OUTPATIENT
Start: 2024-06-19 | End: 2024-06-22 | Stop reason: HOSPADM

## 2024-06-18 RX ORDER — ONDANSETRON 4 MG/1
4 TABLET, ORALLY DISINTEGRATING ORAL EVERY 4 HOURS PRN
Status: DISCONTINUED | OUTPATIENT
Start: 2024-06-18 | End: 2024-06-22 | Stop reason: HOSPADM

## 2024-06-18 RX ORDER — LISINOPRIL 5 MG/1
5 TABLET ORAL DAILY
Status: DISCONTINUED | OUTPATIENT
Start: 2024-06-19 | End: 2024-06-22 | Stop reason: HOSPADM

## 2024-06-18 RX ADMIN — HYDROCODONE BITARTRATE AND ACETAMINOPHEN 1 TABLET: 5; 325 TABLET ORAL at 14:36

## 2024-06-18 RX ADMIN — LIDOCAINE 1 PATCH: 4 PATCH TOPICAL at 14:36

## 2024-06-18 RX ADMIN — SODIUM CHLORIDE, POTASSIUM CHLORIDE, SODIUM LACTATE AND CALCIUM CHLORIDE 1000 ML: 600; 310; 30; 20 INJECTION, SOLUTION INTRAVENOUS at 21:14

## 2024-06-18 RX ADMIN — HYDROMORPHONE HYDROCHLORIDE 1 MG: 1 INJECTION, SOLUTION INTRAMUSCULAR; INTRAVENOUS; SUBCUTANEOUS at 22:42

## 2024-06-18 ASSESSMENT — ENCOUNTER SYMPTOMS
BLURRED VISION: 0
WEAKNESS: 1
DIARRHEA: 0
COUGH: 0
CONSTIPATION: 0
BLOOD IN STOOL: 0
VOMITING: DENIES
NECK PAIN: 0
CHILLS: 0
FATIGUE: 1
DIZZINESS: 0
SHORTNESS OF BREATH: 0
STRIDOR: 0
CLAUDICATION: 0
DEPRESSED MOOD: 1
WHEEZING: 0
DOUBLE VISION: 0
DENIES PAIN: 1
POLYDIPSIA: 0
HALLUCINATIONS: 0
MYALGIAS: 0
VOMITING: 0
BRUISES/BLEEDS EASILY: 0
PAIN SEVERITY GOAL: 0/10
SORE THROAT: 0
PALPITATIONS: 0
HIGHEST PAIN SEVERITY IN PAST 24 HOURS: 0/10
ABDOMINAL PAIN: 0
DEPRESSION: 0
NAUSEA: DENIES
FLANK PAIN: 0
FALLS: 0
FEVER: 0
NAUSEA: 0
HEADACHES: 0
SUBJECTIVE PAIN PROGRESSION: UNCHANGED
MUSCLE WEAKNESS: 1
AGITATION: 1
LOWEST PAIN SEVERITY IN PAST 24 HOURS: 0/10
DESCRIPTION OF MEMORY LOSS: SHORT TERM
PND: 0
SPUTUM PRODUCTION: 0
HEMOPTYSIS: 0
TREMORS: 0
ORTHOPNEA: 0
BACK PAIN: 1
SINUS PAIN: 0
DIAPHORESIS: 0
EYE PAIN: 0
FORGETFULNESS: 1
DESCRIPTION OF MEMORY LOSS: LONG TERM
PERSON REPORTING PAIN: PATIENT
PHOTOPHOBIA: 0
TINGLING: 0
HEARTBURN: 0

## 2024-06-18 ASSESSMENT — PATIENT HEALTH QUESTIONNAIRE - PHQ9
SUM OF ALL RESPONSES TO PHQ9 QUESTIONS 1 AND 2: 0
2. FEELING DOWN, DEPRESSED, IRRITABLE, OR HOPELESS: NOT AT ALL
1. LITTLE INTEREST OR PLEASURE IN DOING THINGS: NOT AT ALL
CLINICAL INTERPRETATION OF PHQ2 SCORE: 2
SUM OF ALL RESPONSES TO PHQ QUESTIONS 1-9: 7
5. POOR APPETITE OR OVEREATING: 1 - SEVERAL DAYS

## 2024-06-18 ASSESSMENT — PAIN DESCRIPTION - PAIN TYPE: TYPE: ACUTE PAIN

## 2024-06-18 ASSESSMENT — ACTIVITIES OF DAILY LIVING (ADL)
CURRENT_FUNCTION: ONE PERSON
AMBULATION ASSISTANCE: ONE PERSON

## 2024-06-18 ASSESSMENT — FIBROSIS 4 INDEX: FIB4 SCORE: 2.24

## 2024-06-18 ASSESSMENT — LIFESTYLE VARIABLES: SUBSTANCE_ABUSE: 0

## 2024-06-18 NOTE — Clinical Note
On assessment this a patient is crying continuously, patient appears very depressed.  Patient states that she has been lying on her sofa and cannot remember for how long. Patient is urinating into a towel in her pants and cannot remember when she last had a bowel motion.  Call placed to the daughter who was in the Dr. office and could not speak to this RN.  No wound observed to sacral area.  DEQUAN called and a patient to be sent to the ER for evaluation as she is also complaining of chest pain and is in the yellow zone. Call placed to the PCP.

## 2024-06-18 NOTE — ED TRIAGE NOTES
Chief Complaint   Patient presents with    Hip Pain     L hip pain. Pt had MGLF and was evaluated prior to today. Pt states pain increasing to L hip. Tearful during triage. [facility] 14 a/o x 2.      Pt denies SI/HI. Pt states she doesn't know her medication allergies.     /59   Pulse 83   Temp 36.2 °C (97.2 °F) (Temporal)   Resp 17   LMP 01/10/1975   SpO2 94%

## 2024-06-18 NOTE — Clinical Note
Noted.  ----- Message -----  From: Jolie Escobar M.D.  Sent: 6/20/2024  11:17 AM PDT  To: Joselyn Diez R.N.  Subject: RE:                                                Thank you so much for your updates.  This is definitely of concern.  I am hoping I can talk to the patient and her daughter and come up with a plan to keep her safe and healthy.    ----- Message -----  From: Joselyn Diez R.N.  Sent: 6/20/2024  10:34 AM PDT  To: Jolie Escobar M.D.  Subject: RE:                                              Thank you,   I agree with your assessment Dr. Alicea I say I do not think she is getting the level of care she requires at [facility], I spoke to the care givers and they say she refuses there cares so they leave her without doing anything for her. As per my CC I found the patient lieing on her sofa with a cloth rag between her legs to soak up the urine per patient and it was very wet.    I  think she needs a higher level of care as of this time also.  Thank you for your attention to this matter,  Joselyn  ----- Message -----  From: Jolie Escobar M.D.  Sent: 6/18/2024   4:53 PM PDT  To: Sneha Medina R.N.; Yecenia Morocho R.N.; *      I spoke with patient's daughter this afternoon.  I am concerned for urinary tract infection so hopefully this will be evaluated at the emergency department.  I did advise her daughter to set up an ASAP appointment with me because we really need to adjust her medications if she is having such bad depression symptoms and I would like to follow-up from her multiple emergency department visits.    ----- Message -----  From: Joselyn Diez R.N.  Sent: 6/18/2024   4:47 PM PDT  To: Sneha Medina R.N.; Yecenia Morocho R.N.; *    On assessment this a patient is crying continuously, patient appears very depressed.  Patient states that she has been lying on her sofa and cannot remember for how long. Patient is urinating into a towel in her  pants and cannot remember when she last had a bowel motion.  Call placed to the daughter who was in the Dr. office and could not speak to this RN.  No wound observed to sacral area.  DEQUAN called and a patient to be sent to the ER for evaluation as she is also complaining of chest pain and is in the yellow zone. Call placed to the PCP.

## 2024-06-18 NOTE — Clinical Note
Thank you,   I agree with your assessment Dr. Alicea I say I do not think she is getting the level of care she requires at [facility], I spoke to the care givers and they say she refuses there cares so they leave her without doing anything for her. As per my CC I found the patient lieing on her sofa with a cloth rag between her legs to soak up the urine per patient and it was very wet.    I  think she needs a higher level of care as of this time also.  Thank you for your attention to this matter,  Joselyn  ----- Message -----  From: Jolie Escobar M.D.  Sent: 6/18/2024   4:53 PM PDT  To: Sneha Medina R.N.; Yecenia Morocho R.N.; *      I spoke with patient's daughter this afternoon.  I am concerned for urinary tract infection so hopefully this will be evaluated at the emergency department.  I did advise her daughter to set up an ASAP appointment with me because we really need to adjust her medications if she is having such bad depression symptoms and I would like to follow-up from her multiple emergency department visits.    ----- Message -----  From: Joselyn Diez R.N.  Sent: 6/18/2024   4:47 PM PDT  To: Sneha Medina R.N.; Yecenia Morocho R.N.; *    On assessment this a patient is crying continuously, patient appears very depressed.  Patient states that she has been lying on her sofa and cannot remember for how long. Patient is urinating into a towel in her pants and cannot remember when she last had a bowel motion.  Call placed to the daughter who was in the DrLeslye office and could not speak to this RN.  No wound observed to sacral area.  DEQUAN called and a patient to be sent to the ER for evaluation as she is also complaining of chest pain and is in the yellow zone. Call placed to the PCP.

## 2024-06-18 NOTE — Clinical Note
noted  ----- Message -----  From: Joselyn Diez R.N.  Sent: 6/18/2024   4:47 PM PDT  To: Sneha Medina R.N.; Yecenia Morocho R.N.; *      On assessment this a patient is crying continuously, patient appears very depressed.  Patient states that she has been lying on her sofa and cannot remember for how long. Patient is urinating into a towel in her pants and cannot remember when she last had a bowel motion.  Call placed to the daughter who was in the Dr. office and could not speak to this RN.  No wound observed to sacral area.  DEQUAN called and a patient to be sent to the ER for evaluation as she is also complaining of chest pain and is in the yellow zone. Call placed to the PCP.

## 2024-06-18 NOTE — ED PROVIDER NOTES
"ED PHYSICIAN NOTE    CHIEF COMPLAINT  Chief Complaint   Patient presents with    Hip Pain     L hip pain. Pt had MGLF and was evaluated prior to today. Pt states pain increasing to L hip. Tearful during triage. GCS 14 a/o x 2.       EXTERNAL RECORDS REVIEWED  6//24 CT scan of the hip shows right total hip arthroplasty no evidence of loosening.  Left hip moderate advanced degenerative joint space narrowing.  Degenerative changes of the lower lumbar spine.  No fracture    HPI/ROS      Donte Magaña is a 83 y.o. female who presents with ongoing left hip pain.  This started quite a while ago.  Patient seen a few days ago in the emergency department with similar symptoms.  Pain is worsened.  She has not taken anything for pain today.  Has not been applying lidocaine patches.  She denies any new injuries or falls.  She has not had fever.  No abdominal pain.     Communication from home health was concerned that the patient may have a urinary tract infection.  Patient also complained of chest pain at 1 point.    Patient has been very depressed because of her hip pain and back pain.      PAST MEDICAL HISTORY  Past Medical History:   Diagnosis Date    A-fib (Formerly [facility])     Anesthesia     \"Tachycardia for 5 days after cataract surgery\"    Anticoagulant long-term use 1/12/2012    Arthritis     osteo-Knees, hips    Atrial fibrillation (HCC)     Backpain     R hip    Bowel habit changes     diarrhea    Breath shortness     with exertion, prn O2 2L    Bronchitis Nov, 2013    CAD (coronary artery disease)     Depression     Glaucoma 5/3/2011    Hematoma complicating a procedure 11/3/2012    Hemorrhagic disorder (Formerly [facility])     bruising/coumadin    Hypertension     Hypothyroid     Lupus (Formerly [facility])     Macular degeneration     Menopause 1/12/2012    Mitral regurgitation 10/30/2012    Obesity 1/12/2012    Pacemaker 2018    Pneumonia feb,2013    Pre-syncope 6/29/2018    Pulmonary hypertension (Formerly [facility]) 10/30/2012    PVC's (premature ventricular " "contractions) 1/12/2012    Senile nuclear sclerosis     Spinal stenosis of lumbar region at multiple levels     Unspecified cataract     repaired bilateral    Unspecified urinary incontinence     Urinary bladder disorder        SOCIAL HISTORY  Social History     Tobacco Use    Smoking status: Never    Smokeless tobacco: Never   Vaping Use    Vaping status: Never Used   Substance Use Topics    Alcohol use: No    Drug use: No       CURRENT MEDICATIONS  Home Medications    **Home medications have not yet been reviewed for this encounter**       Audit from Redirected Encounters    **Home medications have not yet been reviewed for this encounter**         ALLERGIES  Allergies   Allergen Reactions    Amiodarone Hives     Throat and tongue itching    Bactrim Shortness of Breath    Cipro Xr Swelling    Metoprolol Swelling     Causes throat swelling    Morphine Unspecified     Hallucinations    Phytoplex Z-Guard [Petrolatum-Zinc Oxide] Unspecified     \"causes burning\"    Pseudoephedrine Palpitations    Qvar [Beclomethasone Dipropionate] Unspecified     Pressure on heart      Vibramycin Shortness of Breath    Atorvastatin Calcium-Polysorbate 80 Unspecified     Muscle aches      Augmentin Unspecified     Unknown reaction    Diltiazem Rash     rash    Flecainide Unspecified     dizziness    Keflex Unspecified     Pt states \"Unsure\".    Mucinex Unspecified     GI Distress      Tramadol Unspecified     crying    Atorvastatin Myalgia    Tape Rash     Paper tape okay       PHYSICAL EXAM  VITAL SIGNS: /59   Pulse 83   Temp 36.2 °C (97.2 °F) (Temporal)   Resp 20   LMP 01/10/1975   SpO2 94%    Constitutional: Awake and alert  HENT: Normal inspection  Eyes: Normal inspection  Neck: Grossly normal range of motion.  Cardiovascular: Normal heart rate, Normal rhythm.  Symmetric peripheral pulses.   Thorax & Lungs: No respiratory distress, No wheezing, No rales, No rhonchi, No chest tenderness.   Abdomen: Bowel sounds normal, " soft, non-distended, nontender, no mass  Skin: No obvious rash.  Back/Extremities: Mild tenderness over the left gluteus.  Tenderness in the left lower paraspinous musculature of the lumbar spine..  There is no pain with internal/external rotation of the hips.  No crepitus shortening or abnormal rotation.  No tenderness of the femur or knee.  No joint effusion noted.  No redness or warmth over the skin.  Neurologic: Normal sensory and motor in the lower extremities with good EHL, FHL, gastroc, tibialis anterior, quadriceps hamstring.  Psychiatric: Currently tearful.    DIAGNOSTIC STUDIES / PROCEDURES  LABS/EKG  Results for orders placed or performed during the hospital encounter of 06/18/24   CBC WITH DIFFERENTIAL   Result Value Ref Range    WBC 8.1 4.8 - 10.8 K/uL    RBC 4.51 4.20 - 5.40 M/uL    Hemoglobin 14.2 12.0 - 16.0 g/dL    Hematocrit 42.5 37.0 - 47.0 %    MCV 94.2 81.4 - 97.8 fL    MCH 31.5 27.0 - 33.0 pg    MCHC 33.4 32.2 - 35.5 g/dL    RDW 46.7 35.9 - 50.0 fL    Platelet Count 240 164 - 446 K/uL    MPV 9.8 9.0 - 12.9 fL    Neutrophils-Polys 59.50 44.00 - 72.00 %    Lymphocytes 26.70 22.00 - 41.00 %    Monocytes 7.70 0.00 - 13.40 %    Eosinophils 4.80 0.00 - 6.90 %    Basophils 0.70 0.00 - 1.80 %    Immature Granulocytes 0.60 0.00 - 0.90 %    Nucleated RBC 0.00 0.00 - 0.20 /100 WBC    Neutrophils (Absolute) 4.83 1.82 - 7.42 K/uL    Lymphs (Absolute) 2.17 1.00 - 4.80 K/uL    Monos (Absolute) 0.63 0.00 - 0.85 K/uL    Eos (Absolute) 0.39 0.00 - 0.51 K/uL    Baso (Absolute) 0.06 0.00 - 0.12 K/uL    Immature Granulocytes (abs) 0.05 0.00 - 0.11 K/uL    NRBC (Absolute) 0.00 K/uL   COMP METABOLIC PANEL   Result Value Ref Range    Sodium 137 135 - 145 mmol/L    Potassium 4.1 3.6 - 5.5 mmol/L    Chloride 99 96 - 112 mmol/L    Co2 23 20 - 33 mmol/L    Anion Gap 15.0 7.0 - 16.0    Glucose 123 (H) 65 - 99 mg/dL    Bun 40 (H) 8 - 22 mg/dL    Creatinine 1.70 (H) 0.50 - 1.40 mg/dL    Calcium 9.8 8.5 - 10.5 mg/dL     Correct Calcium 9.8 8.5 - 10.5 mg/dL    AST(SGOT) 21 12 - 45 U/L    ALT(SGPT) 11 2 - 50 U/L    Alkaline Phosphatase 90 30 - 99 U/L    Total Bilirubin 0.6 0.1 - 1.5 mg/dL    Albumin 4.0 3.2 - 4.9 g/dL    Total Protein 6.7 6.0 - 8.2 g/dL    Globulin 2.7 1.9 - 3.5 g/dL    A-G Ratio 1.5 g/dL   ESTIMATED GFR   Result Value Ref Range    GFR (CKD-EPI) 29 (A) >60 mL/min/1.73 m 2   URINALYSIS CULTURE, IF INDICATED    Specimen: Urine, Clean Catch   Result Value Ref Range    Micro Urine Req Microscopic    TROPONIN   Result Value Ref Range    Troponin T 22 (H) 6 - 19 ng/L   EKG   Result Value Ref Range    Report       [facility] Emergency Dept.    Test Date:  2024  Pt Name:    STEPHANIE DAVENPORT                 Department: ER  MRN:        [id number]                      Room:       [facility]  Gender:     Female                       Technician: [id number]  :        [id number]                   Requested By:IVETH SANDHU  Order #:    [id number]                    Reading MD: IVETH SANDHU MD    Measurements  Intervals                                Axis  Rate:       86                           P:          0  TN:         122                          QRS:        -69  QRSD:       127                          T:          100  QT:         402  QTc:        481    Interpretive Statements  Ventricular-paced complex(es)  Artifact in lead(s) I,III,aVR,aVL,V1,V3,V4,V5,V6  Compared to ECG 2024 21:17:46    Electronically Signed On 2024 20:26:01 PDT by IVETH SANDHU MD       *Note: Due to a large number of results and/or encounters for the requested time period, some results have not been displayed. A complete set of results can be found in Results Review.     RADIOLOGY      Radiologist interpretation:   CT-HEAD W/O   Final Result         1.  No acute intracranial abnormality is identified, there are nonspecific white matter changes, commonly associated with small vessel ischemic disease.  Associated mild  cerebral atrophy is noted.   2.  Atherosclerosis.               DX-CHEST-PORTABLE (1 VIEW)   Final Result         1.  Left basilar atelectasis or early evolving infiltrates.   2.  Atherosclerosis      CT-LSPINE W/O PLUS RECONS   Final Result         1. No acute fracture appreciated in the lumbar spine             COURSE & MEDICAL DECISION MAKING    INITIAL ASSESSMENT, COURSE AND PLAN  Care Narrative: Patient presents with complaints of left hip pain.  Evaluated a few days ago with CT scans were negative.  Today it seems like her pain is more in her left low back pain in the hip area.  She does not have fever or swelling.  No limited range of motion passively.  She did complain of a good deal of discomfort.  Patient was given a Norco orally.  Will obtain imaging of the lumbar spine.  Obtain screening labs.    Additional reports of concern for UTI.  Ordered UA.  Reported that there was chest pain at home.  This is a recurrent issue for the patient.  Her EKG does not show acute ischemia.  Obtain troponin.    Son called and reported that the patient is usually up and more alert and oriented.  Prior notes document dementia, but given possibility of worse from baseline obtain CT head.    Labs show acute kidney injury with a creatinine of 1.7 elevated BUN.  I ordered IV fluid.  Patient's troponin is stable from prior values.  No leukocytosis or left shift.  CT scan of the head and lumbar spine are without acute findings.    Patient will need to be admitted to the hospital for further treatment of TANIA patient does not have a urinary tract infection..  Likely this is related to the degree of pain she is having and not drinking a lot of fluids.  Would benefit from MRI of hip to rule out acute occult fracture.  Will need IV fluids.  Consult hospitalist.    Discussed case with Dr. Camargo    Measures  Hydration: Based on the patient's presentation of Acute Kidney Injury the patient was given IV fluids. IV Hydration was used  because oral hydration was not as rapid as required. Upon recheck following hydration, the patient was stable.        DISPOSITION AND DISCUSSIONS  I have discussed management of the patient with the following physicians and DANNY's:  as noted above      FINAL IMPRESSION  1.  Left hip pain  2.  Acute kidney injury    This dictation was created using voice recognition software. The accuracy of the dictation is limited to the abilities of the software. I expect there may be some errors of grammar and possibly content. The nursing notes were reviewed and certain aspects of this information were incorporated into this note.    Electronically signed by: Choco Hastings M.D., 6/18/2024

## 2024-06-19 ENCOUNTER — HOME CARE VISIT (OUTPATIENT)
Dept: HOME HEALTH SERVICES | Facility: HOME HEALTHCARE | Age: 84
End: 2024-06-19
Payer: MEDICARE

## 2024-06-19 LAB
ANION GAP SERPL CALC-SCNC: 15 MMOL/L (ref 7–16)
BUN SERPL-MCNC: 30 MG/DL (ref 8–22)
CALCIUM SERPL-MCNC: 9.3 MG/DL (ref 8.5–10.5)
CHLORIDE SERPL-SCNC: 100 MMOL/L (ref 96–112)
CO2 SERPL-SCNC: 24 MMOL/L (ref 20–33)
CREAT SERPL-MCNC: 0.99 MG/DL (ref 0.5–1.4)
CRP SERPL HS-MCNC: 0.6 MG/DL (ref 0–0.75)
ERYTHROCYTE [SEDIMENTATION RATE] IN BLOOD BY WESTERGREN METHOD: 21 MM/HOUR (ref 0–25)
GFR SERPLBLD CREATININE-BSD FMLA CKD-EPI: 56 ML/MIN/1.73 M 2
GLUCOSE SERPL-MCNC: 109 MG/DL (ref 65–99)
POTASSIUM SERPL-SCNC: 3.7 MMOL/L (ref 3.6–5.5)
SODIUM SERPL-SCNC: 139 MMOL/L (ref 135–145)

## 2024-06-19 PROCEDURE — 36415 COLL VENOUS BLD VENIPUNCTURE: CPT

## 2024-06-19 PROCEDURE — 700101 HCHG RX REV CODE 250: Performed by: EMERGENCY MEDICINE

## 2024-06-19 PROCEDURE — 700105 HCHG RX REV CODE 258: Performed by: HOSPITALIST

## 2024-06-19 PROCEDURE — A9270 NON-COVERED ITEM OR SERVICE: HCPCS | Performed by: HOSPITALIST

## 2024-06-19 PROCEDURE — A9270 NON-COVERED ITEM OR SERVICE: HCPCS | Performed by: STUDENT IN AN ORGANIZED HEALTH CARE EDUCATION/TRAINING PROGRAM

## 2024-06-19 PROCEDURE — 700102 HCHG RX REV CODE 250 W/ 637 OVERRIDE(OP): Performed by: HOSPITALIST

## 2024-06-19 PROCEDURE — 80048 BASIC METABOLIC PNL TOTAL CA: CPT

## 2024-06-19 PROCEDURE — 97602 WOUND(S) CARE NON-SELECTIVE: CPT

## 2024-06-19 PROCEDURE — 700102 HCHG RX REV CODE 250 W/ 637 OVERRIDE(OP): Performed by: STUDENT IN AN ORGANIZED HEALTH CARE EDUCATION/TRAINING PROGRAM

## 2024-06-19 PROCEDURE — 770006 HCHG ROOM/CARE - MED/SURG/GYN SEMI*

## 2024-06-19 PROCEDURE — 99232 SBSQ HOSP IP/OBS MODERATE 35: CPT | Performed by: STUDENT IN AN ORGANIZED HEALTH CARE EDUCATION/TRAINING PROGRAM

## 2024-06-19 RX ORDER — OXYCODONE HYDROCHLORIDE 5 MG/1
2.5 TABLET ORAL
Status: DISCONTINUED | OUTPATIENT
Start: 2024-06-19 | End: 2024-06-20

## 2024-06-19 RX ADMIN — LIDOCAINE 1 PATCH: 4 PATCH TOPICAL at 14:36

## 2024-06-19 RX ADMIN — OXYCODONE HYDROCHLORIDE 2.5 MG: 5 TABLET ORAL at 16:30

## 2024-06-19 RX ADMIN — SODIUM CHLORIDE, POTASSIUM CHLORIDE, SODIUM LACTATE AND CALCIUM CHLORIDE: 600; 310; 30; 20 INJECTION, SOLUTION INTRAVENOUS at 01:34

## 2024-06-19 RX ADMIN — ACETAMINOPHEN 650 MG: 325 TABLET, FILM COATED ORAL at 09:29

## 2024-06-19 ASSESSMENT — ENCOUNTER SYMPTOMS
FEVER: 0
NAUSEA: 0
ABDOMINAL PAIN: 0
BACK PAIN: 0
HEADACHES: 0
EYE PAIN: 0
DIZZINESS: 0
VOMITING: 0
COUGH: 0
PALPITATIONS: 0
BLURRED VISION: 0
SHORTNESS OF BREATH: 0
CHILLS: 0
INSOMNIA: 0

## 2024-06-19 ASSESSMENT — FIBROSIS 4 INDEX: FIB4 SCORE: 2.24

## 2024-06-19 ASSESSMENT — PAIN DESCRIPTION - PAIN TYPE
TYPE: ACUTE PAIN
TYPE: ACUTE PAIN

## 2024-06-19 ASSESSMENT — LIFESTYLE VARIABLES: SUBSTANCE_ABUSE: 0

## 2024-06-19 NOTE — PROGRESS NOTES
Report received from DANNY Black at 1564. Pt arrived to unit at 3488.    Patient is A&O x 2. Disoriented to time and situation.  Patient denies pain at this time.  Patient is a max assist.  Bed alarm is on.  Full assessment completed.  Offloading dressings applied to heels and sacrum.  Call light is within reach. All questions answered at this time. Hourly rounding in place.

## 2024-06-19 NOTE — CARE PLAN
The patient is Stable - Low risk of patient condition declining or worsening    Shift Goals  Clinical Goals: Patient will remain free from falls throughout shift.  Patient Goals: Patients pain will be maintained at a 3/10 or less throughout the shift.  Family Goals: KESHA    Progress made toward(s) clinical / shift goals:  Pt remained free from falls throughout shift. Pt slept comfortably throughout shift and had no complaints of pain while resting. Pt provided SCD's for DVT prophylaxis. Mepilexes applied to heels and sacrum.    Patient is not progressing towards the following goals:

## 2024-06-19 NOTE — PROGRESS NOTES
[facility] Medicine Daily Progress Note    Date of Service  6/19/2024    Chief Complaint  Donte Magaña is a 83 y.o. female admitted 6/18/2024 with hip pain.    Hospital Course  Donte Magaña is a 83 y.o. female who presented 6/18/2024 with past medical history of atrial fibrillation, pacemaker, hypothyroidism, dementia, lupus, sacral ulcers who presents to the hospital for lower back pain that started today.  The pain does radiate down her left leg and states that she does have some left leg weakness.  She denies any numbness of her leg lower extremity.  She denies any bowel or bladder changes.  Patient does have a history of dementia and does not remember if she ever had this pain in the past.  She was evaluated in ER on 6/14 where she had a CT scan of the L-spine and hip that were negative.  She does have a history of right hip surgery.  In the past and MRI found that she does have severe canal stenosis of L3.  Patient does not remember if she ever had any back surgeries.  She normally uses a walker or wheelchair at baseline.      CT scan of the head was negative for any intracranial abnormalities  Chest x-ray interpreted by me for no acute pulmonary process  EKG found ventricular paced rhythm    Interval Problem Update  No acute events overnight.  Patient does not recall having hip or back pain and events leading up to hospitalization. She lives in a assisted living facility and states she can transfer to her scooter on her own. She says she has hx of central canal stenosis of spine.  Currently denies any back or leg pain.  MRI lumbar spine pending to evaluate her radiculopathy symptoms.  PT/OT evaluation for disposition planning.  Attempted to call patients daughter and son in law, unable to reach them.  UA and CXR without signs of infection.   Cr improved with IV fluids.  Continue low rate IV fluids for hydration for now.  Holding home lasix given TANIA.  Will continue to try and reach family for medical  management planning and disposition planning. I doubt she would need any operative intervention given her lack of symptoms at bedside today.    I have discussed this patient's plan of care and discharge plan at IDT rounds today with Case Management, Nursing, Nursing leadership, and other members of the IDT team.    Consultants/Specialty  none    Code Status  DNAR/DNI    Disposition  The patient is not medically cleared for discharge to home or a post-acute facility.      I have placed the appropriate orders for post-discharge needs.    Review of Systems  Review of Systems   Constitutional:  Negative for chills and fever.   Eyes:  Negative for blurred vision and pain.   Respiratory:  Negative for cough and shortness of breath.    Cardiovascular:  Negative for chest pain, palpitations and leg swelling.   Gastrointestinal:  Negative for abdominal pain, nausea and vomiting.   Genitourinary:  Negative for dysuria and urgency.   Musculoskeletal:  Negative for back pain.   Skin:  Negative for itching and rash.   Neurological:  Negative for dizziness and headaches.   Psychiatric/Behavioral:  Negative for substance abuse. The patient does not have insomnia.         Physical Exam  Temp:  [35.9 °C (96.6 °F)-36.3 °C (97.3 °F)] 35.9 °C (96.6 °F)  Pulse:  [80-88] 83  Resp:  [16-17] 17  BP: (115-161)/(55-84) 137/58  SpO2:  [95 %-99 %] 95 %    Physical Exam  Vitals reviewed.   Constitutional:       General: She is not in acute distress.     Appearance: She is not diaphoretic.   HENT:      Head: Normocephalic and atraumatic.      Right Ear: External ear normal.      Left Ear: External ear normal.      Nose: Nose normal. No congestion.      Mouth/Throat:      Pharynx: No oropharyngeal exudate or posterior oropharyngeal erythema.   Eyes:      Extraocular Movements: Extraocular movements intact.      Pupils: Pupils are equal, round, and reactive to light.   Cardiovascular:      Rate and Rhythm: Normal rate and regular rhythm.    Pulmonary:      Effort: Pulmonary effort is normal. No respiratory distress.      Breath sounds: Normal breath sounds. No wheezing or rales.   Abdominal:      General: Bowel sounds are normal. There is no distension.      Palpations: Abdomen is soft.      Tenderness: There is no abdominal tenderness. There is no guarding.   Musculoskeletal:         General: No swelling. Normal range of motion.      Cervical back: Normal range of motion and neck supple.      Right lower leg: No edema.      Left lower leg: No edema.   Skin:     General: Skin is warm and dry.   Neurological:      General: No focal deficit present.      Mental Status: She is alert. She is disoriented.      Cranial Nerves: No cranial nerve deficit.      Sensory: No sensory deficit.      Motor: No weakness.   Psychiatric:         Mood and Affect: Mood normal.         Behavior: Behavior normal.         Fluids  No intake or output data in the 24 hours ending 06/19/24 1600    Laboratory  Recent Labs     06/18/24  1457   WBC 8.1   RBC 4.51   HEMOGLOBIN 14.2   HEMATOCRIT 42.5   MCV 94.2   MCH 31.5   MCHC 33.4   RDW 46.7   PLATELETCT 240   MPV 9.8     Recent Labs     06/18/24  1457 06/19/24  0948   SODIUM 137 139   POTASSIUM 4.1 3.7   CHLORIDE 99 100   CO2 23 24   GLUCOSE 123* 109*   BUN 40* 30*   CREATININE 1.70* 0.99   CALCIUM 9.8 9.3                   Imaging  CT-HEAD W/O   Final Result         1.  No acute intracranial abnormality is identified, there are nonspecific white matter changes, commonly associated with small vessel ischemic disease.  Associated mild cerebral atrophy is noted.   2.  Atherosclerosis.               DX-CHEST-PORTABLE (1 VIEW)   Final Result         1.  Left basilar atelectasis or early evolving infiltrates.   2.  Atherosclerosis      CT-LSPINE W/O PLUS RECONS   Final Result         1. No acute fracture appreciated in the lumbar spine         MR-LUMBAR SPINE-W/O    (Results Pending)        Assessment/Plan  * Back pain of lumbar  region with sciatica- (present on admission)  Assessment & Plan  Patient had 1 day of pain but denies any recent falls or trauma.  CT scan was negative  Pain controlled or never narcotics and will monitor mental respiratory status closely  She does have a history of L3 to severe canal stenosis  I have ordered an MRI of the L-spine  PT OT eval    Chronic diastolic heart failure (HCC)  Assessment & Plan  Compensated  Hold Lasix for now    Acute on chronic renal failure (HCC)- (present on admission)  Assessment & Plan  Monitor BMP and assess response  Avoid IV contrast/nephrotoxins/NSAIDs  Dose adjust meds for decreased GFR  Improved with IV fluids    Cardiac pacemaker in situ- (present on admission)  Assessment & Plan  Continue monitoring    Acquired hypothyroidism- (present on admission)  Assessment & Plan  Continue home thyroid medications  Thyroid levels normal    Chronic atrial fibrillation (HCC)- (present on admission)  Assessment & Plan  Rate controlled on atenolol  Not on anticoagulation         VTE prophylaxis: VTE Selection    I have performed a physical exam and reviewed and updated ROS and Plan today (6/19/2024). In review of yesterday's note (6/18/2024), there are no changes except as documented above.

## 2024-06-19 NOTE — ED NOTES
Pt transported off unit by transport tech. Pt awake and breathing with even, unlabored breaths on RA. All belongings with Pt.

## 2024-06-19 NOTE — PROGRESS NOTES
Received report from [facility] nurse. Pt on RA sleeping comfortably, calm, unlabored breathing. Plan of care reviewed. Call light and personal items within reach, bed locked and in lowest position. No further needs at this time.

## 2024-06-19 NOTE — ASSESSMENT & PLAN NOTE
Patient had 1 day of pain but denies any recent falls or trauma.  CT scan was negative  Pain controlled or never narcotics and will monitor mental respiratory status closely  She does have a history of L3 to severe canal stenosis  MRI L spine deferred as patient may not tolerate exam; no acute neurologic findings  Recommend follow up with outpatient spine/pain specialist for continued epidural steroid injections  Continue pain control with oxycodone and tylenol  Home health ordered per family preference, with discharge back to [facility] with HH tomorrow

## 2024-06-19 NOTE — ED NOTES
Med rec updated and complete. Allergies reviewed. Per MAYKEL from [facility]  Last xarelto dose 06/17/24.  No antibiotics noted.    Home pharmacy  [facility] [phone]

## 2024-06-19 NOTE — DISCHARGE PLANNING
HTH/SCP TCN chart review completed. Collaborated with MATY Faustin prior to meeting with the pt. The most current review of medical record, knowledge of pt's PLOF and social support, LACE+ score of 79, 6 clicks scores unavailable.    Pt seen at bedside. Introduced TCN program. Provided education regarding post acute levels of care. Education provided regarding case management policy for blanket SNF referrals. Discussed HTH/SCP plan benefits. Pt verbalizes understanding. Pt defers all dc planning to her daughterReina as pt has STM deficits per pt and review. TCN placed call to Reina @ [phone] to discuss.    TCN discussed dc planning considerations with pt's daughter as noted pt is currently requiring significant assist with mobility and per review/discussion with team, likely will require post acute placement. Note that pt has PT and OT consults in place and TCN will monitor for therapy recs for dc planning as well  Daughter confirms that pt lives in [facility] as well. Note that per discussion with daughter, the pt has required SNF in past. However, pt and family have had a bad experience with SNF in past and request that pt NOT be considered for [facility]. She would prefer consideration from [facility] as 1st choice if possible, though is aware of possible SNF consideration if unable to dc to [facility] (though again, does not want [facility] SNF). Daughter reports she would assist pt with selection from accepting facilities and to contact daughter for all post acute/dc planning considerations (and note pt in agreement with daughter assisting). Note that PTA, pt was on service with [facility] and daughter and pt agreeable to resumption of [facility] services should this be indicated for dc planning.    No additional provider requests and will monitor for therapy recs for dc planning. Given aforementioned, choice proactively obtained for IRF as well as HH, provided to SW to given to DPA and SW aware of aforementioned concerns  from pt/daughter. TCN will continue to follow and collaborate with discharge planning team as additional post acute needs arise. Thank you.     Completed today:  PT/OT consults in place  Choice obtained: IRF, HH ([facility] for resumption if indicated) DME; pt/daughter would like [facility] consideration as 1st choice if post acute recommended, though is aware of possible need for SNF selection if unable to dc to [facility]; note that they DO NOT want [facility] as consideration

## 2024-06-19 NOTE — ASSESSMENT & PLAN NOTE
Monitor BMP and assess response  Avoid IV contrast/nephrotoxins/NSAIDs  Dose adjust meds for decreased GFR  Improved with IV fluids

## 2024-06-19 NOTE — ED NOTES
Bedside report received from off going RN: Destiny, assumed care of patient.  POC discussed with patient. Call light within reach, all needs addressed at this time.       Fall risk interventions in place: Move the patient closer to the nurse's station, Patient's personal possessions are with in their safe reach, Place fall risk sign on patient's door, Keep floor surfaces clean and dry, and Bed Alarm in use (all applicable per [facility] Fall risk assessment)   Continuous monitoring: Cardiac Leads, Pulse Ox, or Blood Pressure  IVF/IV medications: Not Applicable   Oxygen: Room Air  Bedside sitter: Not Applicable   Isolation: Not Applicable

## 2024-06-19 NOTE — PROGRESS NOTES
4 Eyes Skin Assessment Completed by DANNY Craft and DANNY Velez.    Head WDL  Ears WDL  Nose WDL  Mouth WDL  Neck WDL  Breast/Chest WDL  Shoulder Blades WDL  Spine WDL  (R) Arm/Elbow/Hand Bruising  (L) Arm/Elbow/Hand Bruising  Abdomen WDL  Groin WDL  Scrotum/Coccyx/Buttocks Redness and Discoloration  (R) Leg Scar Incision scar on Knee  (L) Leg Scar Incision scar on knee  (R) Heel/Foot/Toe Redness and Boggy  (L) Heel/Foot/Toe Redness and Boggy          Devices In Places Pulse Ox and SCD's      Interventions In Place Heel Mepilex, Sacral Mepilex, Barrier Cream, and Heels Loaded W/Pillows    Possible Skin Injury Yes    Pictures Uploaded Into Epic Yes  Wound Consult Placed Yes  RN Wound Prevention Protocol Ordered Yes

## 2024-06-19 NOTE — ED NOTES
Pt now reports no pain. No longer tearful.  Pt urinated into pure wick. Sample collected and sent.

## 2024-06-19 NOTE — ED NOTES
Pt tearful in [place], answers questions to A+Ox1.  Pt confused about why she is here and how she got here. Pt updated on reasons for being in the hospital, emotionally reassured.  TV turned on for comfort.  Call light in reach, bed alarm on.

## 2024-06-19 NOTE — ED NOTES
ERP notified that Pt  called and stated over the phone to this RN that the patient has been endorsing SI thoughts. ERP stated to have behavioral health evaluate patient.

## 2024-06-19 NOTE — H&P
Hospital Medicine History & Physical Note    Date of Service  6/18/2024    Primary Care Physician  Jolie Escobar M.D.    Consultants      Specialist Names:     Code Status  DNAR/DNI    Chief Complaint  Chief Complaint   Patient presents with    Hip Pain     L hip pain. Pt had MGLF and was evaluated prior to today. Pt states pain increasing to L hip. Tearful during triage. GCS 14 a/o x 2.       History of Presenting Illness  Donte Magaña is a 83 y.o. female who presented 6/18/2024 with past medical history of atrial fibrillation, pacemaker, hypothyroidism, dementia, lupus, sacral ulcers who presents to the hospital for lower back pain that started today.  The pain does radiate down her left leg and states that she does have some left leg weakness.  She denies any numbness of her leg lower extremity.  She denies any bowel or bladder changes.  Patient does have a history of dementia and does not remember if she ever had this pain in the past.  She was evaluated in ER on 6/14 where she had a CT scan of the L-spine and hip that were negative.  She does have a history of right hip surgery.  In the past and MRI found that she does have severe canal stenosis of L3.  Patient does not remember if she ever had any back surgeries.  She normally uses a walker or wheelchair at baseline.     CT scan of the head was negative for any intracranial abnormalities  Chest x-ray interpreted by me for no acute pulmonary process  EKG found ventricular paced rhythm    I discussed the plan of care with patient.    Review of Systems  Review of Systems   Constitutional:  Negative for chills, diaphoresis, fever and malaise/fatigue.   HENT:  Negative for congestion, ear discharge, ear pain, hearing loss, nosebleeds, sinus pain, sore throat and tinnitus.    Eyes:  Negative for blurred vision, double vision, photophobia and pain.   Respiratory:  Negative for cough, hemoptysis, sputum production, shortness of breath, wheezing and stridor.     Cardiovascular:  Negative for chest pain, palpitations, orthopnea, claudication, leg swelling and PND.   Gastrointestinal:  Negative for abdominal pain, blood in stool, constipation, diarrhea, heartburn, melena, nausea and vomiting.   Genitourinary:  Negative for dysuria, flank pain, frequency, hematuria and urgency.   Musculoskeletal:  Positive for back pain. Negative for falls, joint pain, myalgias and neck pain.   Skin:  Negative for itching and rash.   Neurological:  Positive for weakness. Negative for dizziness, tingling, tremors and headaches.   Endo/Heme/Allergies:  Negative for environmental allergies and polydipsia. Does not bruise/bleed easily.   Psychiatric/Behavioral:  Negative for depression, hallucinations, substance abuse and suicidal ideas.        Past Medical History   has a past medical history of A-fib ([facility]), Anesthesia, Anticoagulant long-term use (1/12/2012), Arthritis, Atrial fibrillation ([facility]), Backpain, Bowel habit changes, Breath shortness, Bronchitis (Nov, 2013), CAD (coronary artery disease), Depression, Glaucoma (5/3/2011), Hematoma complicating a procedure (11/3/2012), Hemorrhagic disorder ([facility]), Hypertension, Hypothyroid, Lupus ([facility]), Macular degeneration, Menopause (1/12/2012), Mitral regurgitation (10/30/2012), Obesity (1/12/2012), Pacemaker (2018), Pneumonia (feb,2013), Pre-syncope (6/29/2018), Pulmonary hypertension ([facility]) (10/30/2012), PVC's (premature ventricular contractions) (1/12/2012), Senile nuclear sclerosis, Spinal stenosis of lumbar region at multiple levels, Unspecified cataract, Unspecified urinary incontinence, and Urinary bladder disorder.    Surgical History   has a past surgical history that includes tonsillectomy and adenoidectomy; recovery (11/30/2010); colonoscopy (2008); abdominal hysterectomy total (April 15,1975); other; cataract phaco with iol (4/21/2015); cataract phaco with iol (Right, 5/5/2015); pacemaker insertion (06/30/2018); open reduction; other  "cardiac surgery (12/2017 and 07/19/2018 ); hip arth anterior total (Right, 1/17/2019); irrigation & debridement ortho (Right, 2/14/2019); pr combined ant/post colporrhaphy (1/14/2020); pr lap,diagnostic abdomen (1/14/2020); enterocele repair (1/14/2020); bladder sling female (1/14/2020); vaginal suspension (1/14/2020); salpingo oophorectomy (Bilateral, 1/14/2020); knee arthroplasty total (Left, 5/28/2015); and knee arthroplasty total (Right, 6/23/2016).     Family History  family history includes Cancer in her paternal aunt; Diabetes in her father; GI Disease in her daughter; Heart Disease in her brother and daughter; Other in her daughter; Stroke in her mother, sister, and sister.   Family history reviewed with patient. There is no family history that is pertinent to the chief complaint.     Social History   reports that she has never smoked. She has never used smokeless tobacco. She reports that she does not drink alcohol and does not use drugs.    Allergies  Allergies   Allergen Reactions    Amiodarone Hives     Throat and tongue itching    Bactrim Shortness of Breath    Cipro Xr Swelling    Metoprolol Swelling     Causes throat swelling    Morphine Unspecified     Hallucinations    Phytoplex Z-Guard [Petrolatum-Zinc Oxide] Unspecified     \"causes burning\"    Pseudoephedrine Palpitations    Qvar [Beclomethasone Dipropionate] Unspecified     Pressure on heart      Vibramycin Shortness of Breath    Atorvastatin Calcium-Polysorbate 80 Unspecified     Muscle aches      Augmentin Unspecified     Unknown reaction    Diltiazem Rash     rash    Flecainide Unspecified     dizziness    Keflex Unspecified     Pt states \"Unsure\".    Mucinex Unspecified     GI Distress      Tramadol Unspecified     crying    Atorvastatin Myalgia    Tape Rash     Paper tape okay       Medications  Prior to Admission Medications   Prescriptions Last Dose Informant Patient Reported? Taking?   Home Care Oxygen  MAR from Other Facility Yes No "   Sig: Inhale 2 L/min continuous. Oxygen dose range: 2 L/min  Respiratory route via: Nasal Cannula   Oxygen supplier: [employer]  PATIENT STATES SHE ONLY USES THE OXYGEN PRN      Indications: hypoxia   Mirabegron ER (MYRBETRIQ) 50 MG TABLET SR 24 HR 6/18/2024 at 0800 MAR from Other Facility Yes Yes   Sig: Take 50 mg by mouth every day. Indications: Overactive Bladder   acetaminophen (TYLENOL) 500 MG Tab 6/17/2024 at 2000 MAR from Other Facility Yes Yes   Sig: Take 1,000 mg by mouth 3 times a day as needed for Mild Pain. 2 tablets = 1,000 mg.  Indications: Pain   atenolol (TENORMIN) 25 MG Tab 6/18/2024 at 0800 MAR from Other Facility No Yes   Sig: Take 1 Tablet by mouth every day. Indications: High Blood Pressure Disorder   fluticasone-umeclidinium-vilanterol (TRELEGY ELLIPTA) 100-62.5-25 mcg/act inhaler 6/18/2024 at 0800 MAR from Other Facility No Yes   Sig: Inhale 1 Puff every day.   furosemide (LASIX) 20 MG Tab 6/18/2024 at 0800 MAR from Other Facility No Yes   Sig: Take 1 Tablet by mouth every day.   levothyroxine (SYNTHROID) 50 MCG Tab 6/18/2024 at 0600 MAR from Other Facility Yes Yes   Sig: Take 50 mcg by mouth every morning on an empty stomach. Indications: Underactive Thyroid   lidocaine (LIDODERM) 5 % Patch  MAR from Other Facility No No   Sig: Place 1 Patch on the skin every 24 hours.   lisinopril (PRINIVIL) 5 MG Tab 6/18/2024 at 0800 MAR from Other Facility Yes Yes   Sig: Take 5 mg by mouth every day. Indications: High Blood Pressure Disorder   magnesium oxide (MAG-OX) 400 MG Tab tablet 6/18/2024 at 0800 MAR from Other Facility Yes Yes   Sig: Take 400 mg by mouth every day. Indications: supplement   rivaroxaban (XARELTO) 20 MG Tab tablet 6/17/2024 at 1700 MAR from Other Facility Yes Yes   Sig: Take 20 mg by mouth with dinner. Indications: Atrial Fibrillation with Blood Vessel Occlusion Process, Disease of the Peripheral Arteries   sertraline (ZOLOFT) 50 MG Tab 6/18/2024 at 0800 MAR from Other Facility No Yes    Sig: Take 1 Tablet by mouth every day. Indications: Major Depressive Disorder      Facility-Administered Medications: None       Physical Exam  Temp:  [36.2 °C (97.2 °F)-36.6 °C (97.9 °F)] 36.2 °C (97.2 °F)  Pulse:  [78-83] 80  Resp:  [14-22] 16  BP: (113-120)/(53-70) 115/55  SpO2:  [92 %-97 %] 96 %  Blood Pressure : 115/55   Temperature: 36.2 °C (97.2 °F)   Pulse: 80   Respiration: 16   Pulse Oximetry: 96 %       Physical Exam  Vitals and nursing note reviewed.   Constitutional:       General: She is not in acute distress.     Appearance: Normal appearance. She is not ill-appearing, toxic-appearing or diaphoretic.   HENT:      Head: Normocephalic and atraumatic.      Nose: No congestion or rhinorrhea.      Mouth/Throat:      Pharynx: No oropharyngeal exudate or posterior oropharyngeal erythema.   Eyes:      General: No scleral icterus.  Neck:      Vascular: No carotid bruit or JVD.   Cardiovascular:      Rate and Rhythm: Normal rate and regular rhythm.      Pulses: Normal pulses.      Heart sounds: Normal heart sounds. No murmur heard.     No friction rub. No gallop.   Pulmonary:      Effort: Pulmonary effort is normal. No respiratory distress.      Breath sounds: No stridor. No wheezing, rhonchi or rales.   Abdominal:      General: Abdomen is flat. There is no distension.      Palpations: There is no mass.      Tenderness: There is no abdominal tenderness. There is no left CVA tenderness, guarding or rebound.      Hernia: No hernia is present.   Musculoskeletal:         General: No swelling. Normal range of motion.      Cervical back: No rigidity. No muscular tenderness.      Right lower leg: No edema.      Left lower leg: No edema.   Lymphadenopathy:      Cervical: No cervical adenopathy.   Skin:     General: Skin is warm and dry.      Capillary Refill: Capillary refill takes more than 3 seconds.      Coloration: Skin is not jaundiced or pale.      Findings: No bruising or erythema.   Neurological:      Mental  "Status: She is alert.      Motor: Weakness present.         Laboratory:  Recent Labs     06/18/24  1457   WBC 8.1   RBC 4.51   HEMOGLOBIN 14.2   HEMATOCRIT 42.5   MCV 94.2   MCH 31.5   MCHC 33.4   RDW 46.7   PLATELETCT 240   MPV 9.8     Recent Labs     06/18/24  1457   SODIUM 137   POTASSIUM 4.1   CHLORIDE 99   CO2 23   GLUCOSE 123*   BUN 40*   CREATININE 1.70*   CALCIUM 9.8     Recent Labs     06/18/24  1457   ALTSGPT 11   ASTSGOT 21   ALKPHOSPHAT 90   TBILIRUBIN 0.6   GLUCOSE 123*         No results for input(s): \"NTPROBNP\" in the last 72 hours.      Recent Labs     06/18/24 2007   TROPONINT 22*       Imaging:  CT-HEAD W/O   Final Result         1.  No acute intracranial abnormality is identified, there are nonspecific white matter changes, commonly associated with small vessel ischemic disease.  Associated mild cerebral atrophy is noted.   2.  Atherosclerosis.               DX-CHEST-PORTABLE (1 VIEW)   Final Result         1.  Left basilar atelectasis or early evolving infiltrates.   2.  Atherosclerosis      CT-LSPINE W/O PLUS RECONS   Final Result         1. No acute fracture appreciated in the lumbar spine         MR-LUMBAR SPINE-W/O    (Results Pending)       X-Ray:  I have personally reviewed the images and compared with prior images.  EKG:  I have personally reviewed the images and compared with prior images.    Assessment/Plan:  Justification for Admission Status  I anticipate this patient will require at least two midnights for appropriate medical management, necessitating inpatient admission because back pain    Patient will need a Med/Surg bed on MEDICAL service .  The need is secondary to back pain.    * Back pain of lumbar region with sciatica- (present on admission)  Assessment & Plan  Patient had 1 day of pain but denies any recent falls or trauma.  CT scan was negative  Pain controlled or never narcotics and will monitor mental respiratory status closely  She does have a history of L3 to severe " canal stenosis  I have ordered an MRI of the L-spine  PT OT eval    Chronic diastolic heart failure (HCC)  Assessment & Plan  Compensated  Hold Lasix for now    Acute on chronic renal failure (HCC)- (present on admission)  Assessment & Plan  Monitor BMP and assess response  Avoid IV contrast/nephrotoxins/NSAIDs  Dose adjust meds for decreased GFR      Cardiac pacemaker in situ- (present on admission)  Assessment & Plan  Continue monitoring    Acquired hypothyroidism- (present on admission)  Assessment & Plan  Continue home thyroid medications    Chronic atrial fibrillation (HCC)- (present on admission)  Assessment & Plan  Rate controlled on atenolol  Not on anticoagulation        VTE prophylaxis: SCDs/TEDs

## 2024-06-19 NOTE — WOUND TEAM
[facility]  Inpatient Services  Wound and Skin Care Brief Evaluation    Admission Date: 6/18/2024     Last order of IP CONSULT TO WOUND CARE was found on 6/19/2024 from Hospital Encounter on 6/18/2024     HPI, PMH, SH: Reviewed    Chief Complaint   Patient presents with    Hip Pain     L hip pain. Pt had MGLF and was evaluated prior to today. Pt states pain increasing to L hip. Tearful during triage. GCS 14 a/o x 2.     Diagnosis: Back pain of lumbar region with sciatica [M54.40]    Unit where seen by Wound Team: S525/01     Wound consult placed regarding sacrum and bilateral heels. Chart and images reviewed. This discussed with bedside RN Fadumo. This clinician in to assess patient. Patient pleasant and agreeable. Sacrum, and heels. Non-selectively debrided with Moist warm washcloth.                   No pressure injuries or advanced wound care needs identified. Wound consult completed. No further follow up unless indicated and consulted.     Wound 06/19/24 Pressure Injury Buttocks Bilateral POA resolved stage 2 (Active)   Date First Assessed/Time First Assessed: 06/19/24 0015   Present on Original Admission: Yes  Hand Hygiene Completed: Yes  Primary Wound Type: Pressure Injury  Location: Buttocks  Laterality: Bilateral  Wound Description (Comments): POA resolved stage 2      Assessments 6/19/2024  4:00 PM   Wound Image     Site Assessment Red;Intact   Periwound Assessment Scar tissue   Margins Undefined edges;Attached edges   Closure Open to air   Drainage Amount None   Treatments Cleansed;Site care;Offloading   Wound Cleansing Soap and Water   Periwound Protectant Barrier Paste   NEXT Weekly Photo (Inpatient Only) 06/26/24   Wound Team Following Not following   Pressure Injury Stage Stage 2       PREVENTATIVE INTERVENTIONS:    Q shift Ashutosh - performed per nursing policy  Q shift pressure point assessments - performed per nursing policy    Surface/Positioning  Standard/trauma mattress - Currently  in Place  Reposition q 2 hours - Ordered  TAPs Turning system - Ordered    Offloading/Redistribution  Heel offloading dressing (Silicone dressing) - Currently in Place      Containment/Moisture Prevention    Purwick/Condom Cath - Currently in Place  Barrier paste - Currently in Place

## 2024-06-19 NOTE — ED NOTES
Son called and updated RN on pt. Reports that pt is is usually A+Ox4, ate dinner last night and walks around on her own. Reports that he thinks she may be getting the wrong meds at her SNF.

## 2024-06-19 NOTE — ED NOTES
Bedside report to Wayne DELGADO. Pt on bed alarm, on HR, oxygen, BP, and cardiac monitor. Pt bed locked in lowest position side rail up x 2.    details… detailed exam

## 2024-06-19 NOTE — CARE PLAN
The patient is Stable - Low risk of patient condition declining or worsening    Shift Goals  Clinical Goals: fall safety  Patient Goals: comfort  Family Goals: salbador    Progress made toward(s) clinical / shift goals:        Problem: Pain - Standard  Goal: Alleviation of pain or a reduction in pain to the patient’s comfort goal  Outcome: Progressing     Patient maintains 0 pain level with interventions in place.    Problem: Fall Risk  Goal: Patient will remain free from falls  Outcome: Progressing     Pt free of falls, fall prevention measures in place and charted.    Patient is not progressing towards the following goals:

## 2024-06-19 NOTE — CASE COMMUNICATION
noted  ----- Message -----  From: Jolie Escobar M.D.  Sent: 6/18/2024   4:53 PM PDT  To: Sneha Medina R.N.; Yecenia Morocho R.N.; *      I spoke with patient's daughter this afternoon.  I am concerned for urinary tract infection so hopefully this will be evaluated at the emergency department.  I did advise her daughter to set up an ASAP appointment with me because we really need to adjust her medications if she is having such b ad depression symptoms and I would like to follow-up from her multiple emergency department visits.    ----- Message -----  From: Joselyn Diez R.N.  Sent: 6/18/2024   4:47 PM PDT  To: Sneha Medina R.N.; Yecenia Morocho R.N.; *    On assessment this a patient is crying continuously, patient appears very depressed.  Patient states that she has been lying on her sofa and cannot remember for how long. Patient is urinating i nto a towel in her pants and cannot remember when she last had a bowel motion.  Call placed to the daughter who was in the Dr. office and could not speak to this RN.  No wound observed to sacral area.  DEQUAN called and a patient to be sent to the ER for evaluation as she is also complaining of chest pain and is in the yellow zone. Call placed to the PCP.

## 2024-06-20 LAB
ALBUMIN SERPL BCP-MCNC: 3.6 G/DL (ref 3.2–4.9)
ALBUMIN/GLOB SERPL: 1.6 G/DL
ALP SERPL-CCNC: 79 U/L (ref 30–99)
ALT SERPL-CCNC: 9 U/L (ref 2–50)
ANION GAP SERPL CALC-SCNC: 13 MMOL/L (ref 7–16)
AST SERPL-CCNC: 17 U/L (ref 12–45)
BASOPHILS # BLD AUTO: 0.7 % (ref 0–1.8)
BASOPHILS # BLD: 0.06 K/UL (ref 0–0.12)
BILIRUB SERPL-MCNC: 0.5 MG/DL (ref 0.1–1.5)
BUN SERPL-MCNC: 22 MG/DL (ref 8–22)
CALCIUM ALBUM COR SERPL-MCNC: 9.6 MG/DL (ref 8.5–10.5)
CALCIUM SERPL-MCNC: 9.3 MG/DL (ref 8.5–10.5)
CHLORIDE SERPL-SCNC: 104 MMOL/L (ref 96–112)
CO2 SERPL-SCNC: 25 MMOL/L (ref 20–33)
CREAT SERPL-MCNC: 0.87 MG/DL (ref 0.5–1.4)
EOSINOPHIL # BLD AUTO: 0.58 K/UL (ref 0–0.51)
EOSINOPHIL NFR BLD: 7.1 % (ref 0–6.9)
ERYTHROCYTE [DISTWIDTH] IN BLOOD BY AUTOMATED COUNT: 46.3 FL (ref 35.9–50)
GFR SERPLBLD CREATININE-BSD FMLA CKD-EPI: 65 ML/MIN/1.73 M 2
GLOBULIN SER CALC-MCNC: 2.3 G/DL (ref 1.9–3.5)
GLUCOSE SERPL-MCNC: 129 MG/DL (ref 65–99)
HCT VFR BLD AUTO: 41.8 % (ref 37–47)
HGB BLD-MCNC: 14 G/DL (ref 12–16)
IMM GRANULOCYTES # BLD AUTO: 0.03 K/UL (ref 0–0.11)
IMM GRANULOCYTES NFR BLD AUTO: 0.4 % (ref 0–0.9)
LYMPHOCYTES # BLD AUTO: 1.97 K/UL (ref 1–4.8)
LYMPHOCYTES NFR BLD: 24.2 % (ref 22–41)
MCH RBC QN AUTO: 31.5 PG (ref 27–33)
MCHC RBC AUTO-ENTMCNC: 33.5 G/DL (ref 32.2–35.5)
MCV RBC AUTO: 93.9 FL (ref 81.4–97.8)
MONOCYTES # BLD AUTO: 0.7 K/UL (ref 0–0.85)
MONOCYTES NFR BLD AUTO: 8.6 % (ref 0–13.4)
NEUTROPHILS # BLD AUTO: 4.79 K/UL (ref 1.82–7.42)
NEUTROPHILS NFR BLD: 59 % (ref 44–72)
NRBC # BLD AUTO: 0 K/UL
NRBC BLD-RTO: 0 /100 WBC (ref 0–0.2)
PLATELET # BLD AUTO: 232 K/UL (ref 164–446)
PMV BLD AUTO: 10.1 FL (ref 9–12.9)
POTASSIUM SERPL-SCNC: 3.7 MMOL/L (ref 3.6–5.5)
PROT SERPL-MCNC: 5.9 G/DL (ref 6–8.2)
RBC # BLD AUTO: 4.45 M/UL (ref 4.2–5.4)
SODIUM SERPL-SCNC: 142 MMOL/L (ref 135–145)
WBC # BLD AUTO: 8.1 K/UL (ref 4.8–10.8)

## 2024-06-20 PROCEDURE — 36415 COLL VENOUS BLD VENIPUNCTURE: CPT

## 2024-06-20 PROCEDURE — 85025 COMPLETE CBC W/AUTO DIFF WBC: CPT

## 2024-06-20 PROCEDURE — 80053 COMPREHEN METABOLIC PANEL: CPT

## 2024-06-20 PROCEDURE — 700102 HCHG RX REV CODE 250 W/ 637 OVERRIDE(OP): Performed by: STUDENT IN AN ORGANIZED HEALTH CARE EDUCATION/TRAINING PROGRAM

## 2024-06-20 PROCEDURE — 770001 HCHG ROOM/CARE - MED/SURG/GYN PRIV*

## 2024-06-20 PROCEDURE — A9270 NON-COVERED ITEM OR SERVICE: HCPCS | Performed by: STUDENT IN AN ORGANIZED HEALTH CARE EDUCATION/TRAINING PROGRAM

## 2024-06-20 PROCEDURE — 700111 HCHG RX REV CODE 636 W/ 250 OVERRIDE (IP): Performed by: NURSE PRACTITIONER

## 2024-06-20 PROCEDURE — 99232 SBSQ HOSP IP/OBS MODERATE 35: CPT | Performed by: STUDENT IN AN ORGANIZED HEALTH CARE EDUCATION/TRAINING PROGRAM

## 2024-06-20 PROCEDURE — A9270 NON-COVERED ITEM OR SERVICE: HCPCS | Performed by: HOSPITALIST

## 2024-06-20 PROCEDURE — 700102 HCHG RX REV CODE 250 W/ 637 OVERRIDE(OP): Performed by: HOSPITALIST

## 2024-06-20 PROCEDURE — 700101 HCHG RX REV CODE 250: Performed by: EMERGENCY MEDICINE

## 2024-06-20 RX ORDER — HYDROXYZINE 50 MG/1
25 TABLET, FILM COATED ORAL 3 TIMES DAILY PRN
Status: DISCONTINUED | OUTPATIENT
Start: 2024-06-20 | End: 2024-06-22 | Stop reason: HOSPADM

## 2024-06-20 RX ORDER — DIAZEPAM 5 MG/ML
5 INJECTION, SOLUTION INTRAMUSCULAR; INTRAVENOUS ONCE
Status: COMPLETED | OUTPATIENT
Start: 2024-06-20 | End: 2024-06-20

## 2024-06-20 RX ORDER — OXYCODONE HYDROCHLORIDE 5 MG/1
5 TABLET ORAL EVERY 4 HOURS PRN
Status: DISCONTINUED | OUTPATIENT
Start: 2024-06-20 | End: 2024-06-22 | Stop reason: HOSPADM

## 2024-06-20 RX ADMIN — ATENOLOL 25 MG: 50 TABLET ORAL at 04:45

## 2024-06-20 RX ADMIN — SERTRALINE HYDROCHLORIDE 50 MG: 50 TABLET ORAL at 04:45

## 2024-06-20 RX ADMIN — FLUTICASONE FUROATE, UMECLIDINIUM BROMIDE AND VILANTEROL TRIFENATATE 1 PUFF: 100; 62.5; 25 POWDER RESPIRATORY (INHALATION) at 04:45

## 2024-06-20 RX ADMIN — LEVOTHYROXINE SODIUM 50 MCG: 0.05 TABLET ORAL at 04:45

## 2024-06-20 RX ADMIN — OXYCODONE HYDROCHLORIDE 2.5 MG: 5 TABLET ORAL at 13:25

## 2024-06-20 RX ADMIN — LIDOCAINE 1 PATCH: 4 PATCH TOPICAL at 13:26

## 2024-06-20 RX ADMIN — DIAZEPAM 5 MG: 10 INJECTION, SOLUTION INTRAMUSCULAR; INTRAVENOUS at 22:01

## 2024-06-20 ASSESSMENT — ENCOUNTER SYMPTOMS
INSOMNIA: 0
BACK PAIN: 0
COUGH: 0
FEVER: 0
VOMITING: 0
HEADACHES: 0
ABDOMINAL PAIN: 0
EYE PAIN: 0
BLURRED VISION: 0
PALPITATIONS: 0
NAUSEA: 0
DIZZINESS: 0
CHILLS: 0
SHORTNESS OF BREATH: 0

## 2024-06-20 ASSESSMENT — LIFESTYLE VARIABLES: SUBSTANCE_ABUSE: 0

## 2024-06-20 ASSESSMENT — PAIN DESCRIPTION - PAIN TYPE
TYPE: ACUTE PAIN

## 2024-06-20 ASSESSMENT — PATIENT HEALTH QUESTIONNAIRE - PHQ9
SUM OF ALL RESPONSES TO PHQ9 QUESTIONS 1 AND 2: 0
2. FEELING DOWN, DEPRESSED, IRRITABLE, OR HOPELESS: NOT AT ALL
1. LITTLE INTEREST OR PLEASURE IN DOING THINGS: NOT AT ALL

## 2024-06-20 NOTE — CARE PLAN
The patient is Stable - Low risk of patient condition declining or worsening    Shift Goals  Clinical Goals: safety, comfort  Patient Goals: comfort  Family Goals: updates    Progress made toward(s) clinical / shift goals:        Problem: Fall Risk  Goal: Patient will remain free from falls  6/20/2024 1606 by Fadumo Soler R.N.  Outcome: Progressing    Pt free of falls, fall prevention measures in place and charted.     Problem: Skin Integrity  Goal: Skin integrity is maintained or improved  Outcome: Progressing     Skin breakdown prevention measures in place, no sign of new onset skin breakdown    Patient is not progressing towards the following goals:

## 2024-06-20 NOTE — THERAPY
06/20/24 1250   Interdisciplinary Plan of Care Collaboration   Collaboration Comments Hold formal eval, pt pending MRI

## 2024-06-20 NOTE — THERAPY
Occupational Therapy Contact Note    Patient Name: Donte Magaña  Age:  83 y.o., Sex:  female  Medical Record #: [id number]  Today's Date: 6/20/2024    OT consult received, pt pending MRI of lumbar spine and definitve POC following MRI. Will hold and follow up as able/appropriate.      Jamal Fields OTD, OTR/L

## 2024-06-20 NOTE — DISCHARGE PLANNING
TCN following. HTH/SCP chart reviewed. No new TCN needs identified. Please see prior TCN note from 6/19 for most recent discharge planning considerations if indicated.     Completed:  PT/OT orders in chart  Choice obtained: IRF, HH ([facility] for resumption if indicated) DME; pt/daughter would like [facility] consideration as 1st choice if post acute recommended, though is aware of possible need for SNF selection if unable to dc to [facility]; note that they DO NOT want [facility] as consideration

## 2024-06-20 NOTE — PROGRESS NOTES
Hospital Medicine Daily Progress Note    Date of Service  6/20/2024    Chief Complaint  Donte Magaña is a 83 y.o. female admitted 6/18/2024 with hip pain.    Hospital Course  Donte Magaña is a 83 y.o. female who presented 6/18/2024 with past medical history of atrial fibrillation, pacemaker, hypothyroidism, dementia, lupus, sacral ulcers who presents to the hospital for lower back pain that started today.  The pain does radiate down her left leg and states that she does have some left leg weakness.  She denies any numbness of her leg lower extremity.  She denies any bowel or bladder changes.  Patient does have a history of dementia and does not remember if she ever had this pain in the past.  She was evaluated in ER on 6/14 where she had a CT scan of the L-spine and hip that were negative.  She does have a history of right hip surgery.  In the past and MRI found that she does have severe canal stenosis of L3.  Patient does not remember if she ever had any back surgeries.  She normally uses a walker or wheelchair at baseline.      CT scan of the head was negative for any intracranial abnormalities  Chest x-ray interpreted by me for no acute pulmonary process  EKG found ventricular paced rhythm    Interval Problem Update  No acute events overnight.  Patient reports severe back pain with attempt to get up and walk, laying down she reports pain is currently controlled.  Patient denies depression, she states she weeps as an outlet for her frustration as her pain and symptoms are not controlled.  Hydroxyzine prn ordered for anxiety.  Increase oxycodone dose to 5 mg prn.  MRI lumbar spine pending.  Family would like patient to discharge back to [facility] with home health when discharged. Home health ordered.  Cr improved to 0.87, stop IV fluids. Encourage PO intake.    I have discussed this patient's plan of care and discharge plan at IDT rounds today with Case Management, Nursing, Nursing leadership, and other members  of the IDT team.    Consultants/Specialty  none    Code Status  DNAR/DNI    Disposition  The patient is not medically cleared for discharge to home or a post-acute facility.      I have placed the appropriate orders for post-discharge needs.    Review of Systems  Review of Systems   Constitutional:  Negative for chills and fever.   Eyes:  Negative for blurred vision and pain.   Respiratory:  Negative for cough and shortness of breath.    Cardiovascular:  Negative for chest pain, palpitations and leg swelling.   Gastrointestinal:  Negative for abdominal pain, nausea and vomiting.   Genitourinary:  Negative for dysuria and urgency.   Musculoskeletal:  Negative for back pain.   Skin:  Negative for itching and rash.   Neurological:  Negative for dizziness and headaches.   Psychiatric/Behavioral:  Negative for substance abuse. The patient does not have insomnia.         Physical Exam  Temp:  [35.8 °C (96.4 °F)-36.6 °C (97.8 °F)] 36.3 °C (97.4 °F)  Pulse:  [68-84] 78  Resp:  [16-20] 20  BP: (135-153)/(58-96) 153/87  SpO2:  [94 %-97 %] 97 %    Physical Exam  Vitals reviewed.   Constitutional:       General: She is not in acute distress.     Appearance: She is not diaphoretic.   HENT:      Head: Normocephalic and atraumatic.      Right Ear: External ear normal.      Left Ear: External ear normal.      Nose: Nose normal. No congestion.      Mouth/Throat:      Pharynx: No oropharyngeal exudate or posterior oropharyngeal erythema.   Eyes:      Extraocular Movements: Extraocular movements intact.      Pupils: Pupils are equal, round, and reactive to light.   Cardiovascular:      Rate and Rhythm: Normal rate and regular rhythm.   Pulmonary:      Effort: Pulmonary effort is normal. No respiratory distress.      Breath sounds: Normal breath sounds. No wheezing or rales.   Abdominal:      General: Bowel sounds are normal. There is no distension.      Palpations: Abdomen is soft.      Tenderness: There is no abdominal tenderness.  There is no guarding.   Musculoskeletal:         General: No swelling. Normal range of motion.      Cervical back: Normal range of motion and neck supple.      Right lower leg: No edema.      Left lower leg: No edema.   Skin:     General: Skin is warm and dry.   Neurological:      General: No focal deficit present.      Mental Status: She is alert. She is disoriented.      Cranial Nerves: No cranial nerve deficit.      Sensory: No sensory deficit.      Motor: No weakness.   Psychiatric:         Mood and Affect: Mood normal.         Behavior: Behavior normal.         Fluids    Intake/Output Summary (Last 24 hours) at 6/20/2024 1650  Last data filed at 6/19/2024 1915  Gross per 24 hour   Intake 50 ml   Output --   Net 50 ml       Laboratory  Recent Labs     06/18/24  1457 06/20/24  0238   WBC 8.1 8.1   RBC 4.51 4.45   HEMOGLOBIN 14.2 14.0   HEMATOCRIT 42.5 41.8   MCV 94.2 93.9   MCH 31.5 31.5   MCHC 33.4 33.5   RDW 46.7 46.3   PLATELETCT 240 232   MPV 9.8 10.1     Recent Labs     06/18/24  1457 06/19/24  0948 06/20/24  0238   SODIUM 137 139 142   POTASSIUM 4.1 3.7 3.7   CHLORIDE 99 100 104   CO2 23 24 25   GLUCOSE 123* 109* 129*   BUN 40* 30* 22   CREATININE 1.70* 0.99 0.87   CALCIUM 9.8 9.3 9.3                   Imaging  CT-HEAD W/O   Final Result         1.  No acute intracranial abnormality is identified, there are nonspecific white matter changes, commonly associated with small vessel ischemic disease.  Associated mild cerebral atrophy is noted.   2.  Atherosclerosis.               DX-CHEST-PORTABLE (1 VIEW)   Final Result         1.  Left basilar atelectasis or early evolving infiltrates.   2.  Atherosclerosis      CT-LSPINE W/O PLUS RECONS   Final Result         1. No acute fracture appreciated in the lumbar spine         MR-LUMBAR SPINE-W/O    (Results Pending)        Assessment/Plan  * Back pain of lumbar region with sciatica- (present on admission)  Assessment & Plan  Patient had 1 day of pain but denies any  recent falls or trauma.  CT scan was negative  Pain controlled or never narcotics and will monitor mental respiratory status closely  She does have a history of L3 to severe canal stenosis  I have ordered an MRI of the L-spine  PT OT eval  Home health ordered per family preference, with discharge back to [facility]    Chronic diastolic heart failure (HCC)  Assessment & Plan  Compensated  Hold Lasix for now    Acute on chronic renal failure (HCC)- (present on admission)  Assessment & Plan  Monitor BMP and assess response  Avoid IV contrast/nephrotoxins/NSAIDs  Dose adjust meds for decreased GFR  Improved with IV fluids    Cardiac pacemaker in situ- (present on admission)  Assessment & Plan  Continue monitoring    Acquired hypothyroidism- (present on admission)  Assessment & Plan  Continue home thyroid medications  Thyroid levels normal    Chronic atrial fibrillation (HCC)- (present on admission)  Assessment & Plan  Rate controlled on atenolol  Not on anticoagulation         VTE prophylaxis: VTE Selection    I have performed a physical exam and reviewed and updated ROS and Plan today (6/20/2024). In review of yesterday's note (6/19/2024), there are no changes except as documented above.

## 2024-06-20 NOTE — PROGRESS NOTES
Received report from [facility] nurse. Pt on RA sleeping comfortably, calm, unlabored breathing. Plan of care reviewed. Call light and personal items within reach, bed locked and in lowest position. No further needs at this time.

## 2024-06-20 NOTE — CARE PLAN
The patient is Stable - Low risk of patient condition declining or worsening    Shift Goals  Clinical Goals: pt will remain free from falls, pain control to comfort level  Patient Goals: comfort, rest  Family Goals: updates    Progress made toward(s) clinical / shift goals:        Problem: Pain - Standard  Goal: Alleviation of pain or a reduction in pain to the patient’s comfort goal  Outcome: Progressing     Problem: Knowledge Deficit - Standard  Goal: Patient and family/care givers will demonstrate understanding of plan of care, disease process/condition, diagnostic tests and medications  Outcome: Progressing     Problem: Fall Risk  Goal: Patient will remain free from falls  Outcome: Progressing       Patient is not progressing towards the following goals:

## 2024-06-20 NOTE — DISCHARGE PLANNING
Case Management Discharge Planning    Admission Date: 6/18/2024  GMLOS: 2.9  ALOS: 2    6-Clicks ADL Score:    6-Clicks Mobility Score:    PT and/or OT Eval ordered: Yes  Post-acute Referrals Ordered: Yes  Post-acute Choice Obtained: Yes  Has referral(s) been sent to post-acute provider:  Yes      Anticipated Discharge Dispo: Discharge Disposition: Disch to  rehab facility or distinct part unit (62)    DME Needed: No    Action(s) Taken: OTHER    SW spoke with Reina, patient's daughter regarding Acute Rehab VS SNF.     Reina indicates her preference is [facility]. Per Reina, if patient does not meet criteria for inpatient rehab, she might consider SNF. [facility] does not [facility], MD aware.    Escalations Completed: None    Medically Clear: No    Next Steps:     Barriers to Discharge: Medical clearance    Is the patient up for discharge tomorrow:

## 2024-06-20 NOTE — DISCHARGE PLANNING
*ATTN Discharge Planning team: This patient is currently on service with [facility]. Please submit a referral order and face-to-face prior to discharging the patient home. If you have any questions, contact our Transitional Care Specialist team at x [phone].

## 2024-06-20 NOTE — DISCHARGE PLANNING
Care Transition Team Assessment    SW met with patient and spoke with Reina, patient's daughter via phone.    Per Reina, patient resides at [facility].    Per Reina, patient owns a FWW and Motorized Wheelchair.    Per Reina, patient will benefit from inpatient physical therapy before returning to [facility].    Information Source  Orientation Level: Oriented X4  Information Given By: Patient, Relative  Informant's Name: Reina  Who is responsible for making decisions for patient? : Patient    Readmission Evaluation  Is this a readmission?: No    Elopement Risk  Legal Hold: No  Ambulatory or Self Mobile in Wheelchair: No-Not an Elopement Risk  Disoriented: Time-At Risk for Elopement  Psychiatric Symptoms: None  History of Wandering: No  Elopement this Admit: No  Vocalizing Wanting to Leave: No  Displays Behaviors, Body Language Wanting to Leave: No-Not at Risk for Elopement  Elopement Risk: Not at Risk for Elopement  Wanderguard On: Unavailable  Personal Belongings: Hospital Clothing Only         Discharge Preparedness  What is your plan after discharge?: Uncertain - pending medical team collaboration  What are your discharge supports?: Child  Prior Functional Level: Needs Assist with Activities of Daily Living  Difficulity with ADLs: Other    Functional Assesment  Prior Functional Level: Needs Assist with Activities of Daily Living    Finances  Financial Barriers to Discharge: No  Prescription Coverage: Yes    Vision / Hearing Impairment  Right Eye Vision: Impaired, Wears Glasses  Left Eye Vision: Impaired, Wears Glasses         Advance Directive  Advance Directive?: None  Advance Directive offered?: AD Booklet refused    Domestic Abuse  Have you ever been the victim of abuse or violence?: No    Psychological Assessment  History of Substance Abuse: None  History of Psychiatric Problems: No    Discharge Risks or Barriers  Discharge risks or barriers?: Other  (comment)    Anticipated Discharge Information  Discharge Disposition: Disch to IP rehab facility or distinct part unit (62)

## 2024-06-21 ENCOUNTER — APPOINTMENT (OUTPATIENT)
Dept: RADIOLOGY | Facility: MEDICAL CENTER | Age: 84
DRG: 552 | End: 2024-06-21
Attending: STUDENT IN AN ORGANIZED HEALTH CARE EDUCATION/TRAINING PROGRAM
Payer: MEDICARE

## 2024-06-21 ENCOUNTER — HOME CARE VISIT (OUTPATIENT)
Dept: HOME HEALTH SERVICES | Facility: HOME HEALTHCARE | Age: 84
End: 2024-06-21
Payer: MEDICARE

## 2024-06-21 PROCEDURE — 770001 HCHG ROOM/CARE - MED/SURG/GYN PRIV*

## 2024-06-21 PROCEDURE — 700102 HCHG RX REV CODE 250 W/ 637 OVERRIDE(OP): Performed by: HOSPITALIST

## 2024-06-21 PROCEDURE — 700102 HCHG RX REV CODE 250 W/ 637 OVERRIDE(OP): Performed by: STUDENT IN AN ORGANIZED HEALTH CARE EDUCATION/TRAINING PROGRAM

## 2024-06-21 PROCEDURE — 99232 SBSQ HOSP IP/OBS MODERATE 35: CPT | Performed by: STUDENT IN AN ORGANIZED HEALTH CARE EDUCATION/TRAINING PROGRAM

## 2024-06-21 PROCEDURE — A9270 NON-COVERED ITEM OR SERVICE: HCPCS | Performed by: HOSPITALIST

## 2024-06-21 PROCEDURE — A9270 NON-COVERED ITEM OR SERVICE: HCPCS | Performed by: STUDENT IN AN ORGANIZED HEALTH CARE EDUCATION/TRAINING PROGRAM

## 2024-06-21 RX ADMIN — ATENOLOL 25 MG: 50 TABLET ORAL at 06:34

## 2024-06-21 RX ADMIN — FLUTICASONE FUROATE, UMECLIDINIUM BROMIDE AND VILANTEROL TRIFENATATE 1 PUFF: 100; 62.5; 25 POWDER RESPIRATORY (INHALATION) at 06:34

## 2024-06-21 RX ADMIN — HYDROXYZINE HYDROCHLORIDE 25 MG: 50 TABLET, FILM COATED ORAL at 19:08

## 2024-06-21 RX ADMIN — RIVAROXABAN 15 MG: 15 TABLET, FILM COATED ORAL at 19:08

## 2024-06-21 RX ADMIN — OXYCODONE HYDROCHLORIDE 5 MG: 5 TABLET ORAL at 01:03

## 2024-06-21 RX ADMIN — OXYCODONE HYDROCHLORIDE 5 MG: 5 TABLET ORAL at 19:09

## 2024-06-21 RX ADMIN — LEVOTHYROXINE SODIUM 50 MCG: 0.05 TABLET ORAL at 06:34

## 2024-06-21 RX ADMIN — HYDROXYZINE HYDROCHLORIDE 25 MG: 50 TABLET, FILM COATED ORAL at 01:02

## 2024-06-21 RX ADMIN — SERTRALINE HYDROCHLORIDE 50 MG: 50 TABLET ORAL at 06:34

## 2024-06-21 ASSESSMENT — LIFESTYLE VARIABLES: SUBSTANCE_ABUSE: 0

## 2024-06-21 ASSESSMENT — ENCOUNTER SYMPTOMS
NAUSEA: 0
INSOMNIA: 0
HEADACHES: 0
EYE PAIN: 0
BACK PAIN: 0
VOMITING: 0
CHILLS: 0
SHORTNESS OF BREATH: 0
DIZZINESS: 0
ABDOMINAL PAIN: 0
BLURRED VISION: 0
FEVER: 0
PALPITATIONS: 0
COUGH: 0

## 2024-06-21 ASSESSMENT — CHA2DS2 SCORE
CHF OR LEFT VENTRICULAR DYSFUNCTION: NO
SEX: FEMALE
AGE 65 TO 74: NO
PRIOR STROKE OR TIA OR THROMBOEMBOLISM: NO
VASCULAR DISEASE: YES
AGE 75 OR GREATER: YES
DIABETES: NO
HYPERTENSION: YES
CHA2DS2 VASC SCORE: 5

## 2024-06-21 ASSESSMENT — PAIN DESCRIPTION - PAIN TYPE
TYPE: ACUTE PAIN

## 2024-06-21 NOTE — PROGRESS NOTES
Pt has a lumbar spine MRI ordered; pt has a pacemaker implanted. Pt's implant is MRI conditional; however the patient needs to be A&O x 4 and able to squeeze an emergency ball during the MRI if she feels any discomfort related to her pacemaker during the exam. I spoke with the pt's RN; RN stated she did not feel the patient would be able to follow these commands as she is confused and has dementia.  RN to speak with ordering MD.

## 2024-06-21 NOTE — DISCHARGE PLANNING
TCN following. HTH/SCP chart reviewed. No new TCN needs identified.  Noted PT/OT orders in chart.    Noted per prior TCN, IRF is first choice, and is aware of possible need for SNF selection if unable to dc to [place]; note patient does not want [facility].  Noted blanket SNF referral sent, and patient has multiple accepting SNFs.  Discharge considerations post acute vs. Return to [facility] with Home health pending further medical workup and therapy recommendations. Please see prior TCN note from 6/19 for most additional discharge information    Completed:  PT/OT orders in chart  Choice obtained: IRF (first choice), HH ([facility] for resumption)   Pt/Family aware of [facility]'s blanket SNF referral policy.

## 2024-06-21 NOTE — PROGRESS NOTES
Hospital Medicine Daily Progress Note    Date of Service  6/21/2024    Chief Complaint  Donte Magaña is a 83 y.o. female admitted 6/18/2024 with hip pain.    Hospital Course  Donte Magaña is a 83 y.o. female who presented 6/18/2024 with past medical history of atrial fibrillation, pacemaker, hypothyroidism, dementia, lupus, sacral ulcers who presents to the hospital for lower back pain that started today.  The pain does radiate down her left leg and states that she does have some left leg weakness.  She denies any numbness of her leg lower extremity.  She denies any bowel or bladder changes.  Patient does have a history of dementia and does not remember if she ever had this pain in the past.  She was evaluated in ER on 6/14 where she had a CT scan of the L-spine and hip that were negative.  She does have a history of right hip surgery.  In the past and MRI found that she does have severe canal stenosis of L3.  Patient does not remember if she ever had any back surgeries.  She normally uses a walker or wheelchair at baseline.      CT scan of the head was negative for any intracranial abnormalities  Chest x-ray interpreted by me for no acute pulmonary process  EKG found ventricular paced rhythm    Interval Problem Update  No acute events overnight.  Discussed with patients daughter, will defer MRI lumbar spine at this time as patient may not be able to tolerate exam.  Given no acute focal exam findings, no neurologic emergency suspected.  Continue pain control for acute on chronic back pain.  Daughter notes patient has received epidural steroid injections before, recommend continuing to follow up as outpatient for these.  At this time daughter and patient prefer going back home with home health as opposed to SNF. Therefore  ordered.  Patient to discharge back to [facility] with home health.  Anticipate discharge back home tomorrow.      I have discussed this patient's plan of care and discharge plan at IDT rounds  today with Case Management, Nursing, Nursing leadership, and other members of the IDT team.    Consultants/Specialty  none    Code Status  DNAR/DNI    Disposition  The patient is medically cleared for discharge to home or a post-acute facility.  Anticipate discharge to: home with organized home healthcare and close outpatient follow-up    I have placed the appropriate orders for post-discharge needs.    Review of Systems  Review of Systems   Constitutional:  Negative for chills and fever.   Eyes:  Negative for blurred vision and pain.   Respiratory:  Negative for cough and shortness of breath.    Cardiovascular:  Negative for chest pain, palpitations and leg swelling.   Gastrointestinal:  Negative for abdominal pain, nausea and vomiting.   Genitourinary:  Negative for dysuria and urgency.   Musculoskeletal:  Negative for back pain.   Skin:  Negative for itching and rash.   Neurological:  Negative for dizziness and headaches.   Psychiatric/Behavioral:  Negative for substance abuse. The patient does not have insomnia.         Physical Exam  Temp:  [36 °C (96.8 °F)-36.3 °C (97.4 °F)] 36 °C (96.8 °F)  Pulse:  [73-97] 97  Resp:  [16-20] 16  BP: (147-174)/(66-96) 152/81  SpO2:  [93 %-97 %] 94 %    Physical Exam  Vitals reviewed.   Constitutional:       General: She is not in acute distress.     Appearance: She is not diaphoretic.   HENT:      Head: Normocephalic and atraumatic.      Right Ear: External ear normal.      Left Ear: External ear normal.      Nose: Nose normal. No congestion.      Mouth/Throat:      Pharynx: No oropharyngeal exudate or posterior oropharyngeal erythema.   Eyes:      Extraocular Movements: Extraocular movements intact.      Pupils: Pupils are equal, round, and reactive to light.   Cardiovascular:      Rate and Rhythm: Normal rate and regular rhythm.   Pulmonary:      Effort: Pulmonary effort is normal. No respiratory distress.      Breath sounds: Normal breath sounds. No wheezing or rales.    Abdominal:      General: Bowel sounds are normal. There is no distension.      Palpations: Abdomen is soft.      Tenderness: There is no abdominal tenderness. There is no guarding.   Musculoskeletal:         General: No swelling. Normal range of motion.      Cervical back: Normal range of motion and neck supple.      Right lower leg: No edema.      Left lower leg: No edema.   Skin:     General: Skin is warm and dry.   Neurological:      General: No focal deficit present.      Mental Status: She is alert. She is disoriented.      Cranial Nerves: No cranial nerve deficit.      Sensory: No sensory deficit.      Motor: No weakness.   Psychiatric:         Mood and Affect: Mood normal.         Behavior: Behavior normal.         Fluids  No intake or output data in the 24 hours ending 06/21/24 1609      Laboratory  Recent Labs     06/20/24  0238   WBC 8.1   RBC 4.45   HEMOGLOBIN 14.0   HEMATOCRIT 41.8   MCV 93.9   MCH 31.5   MCHC 33.5   RDW 46.3   PLATELETCT 232   MPV 10.1     Recent Labs     06/19/24  0948 06/20/24  0238   SODIUM 139 142   POTASSIUM 3.7 3.7   CHLORIDE 100 104   CO2 24 25   GLUCOSE 109* 129*   BUN 30* 22   CREATININE 0.99 0.87   CALCIUM 9.3 9.3                   Imaging  CT-HEAD W/O   Final Result         1.  No acute intracranial abnormality is identified, there are nonspecific white matter changes, commonly associated with small vessel ischemic disease.  Associated mild cerebral atrophy is noted.   2.  Atherosclerosis.               DX-CHEST-PORTABLE (1 VIEW)   Final Result         1.  Left basilar atelectasis or early evolving infiltrates.   2.  Atherosclerosis      CT-LSPINE W/O PLUS RECONS   Final Result         1. No acute fracture appreciated in the lumbar spine              Assessment/Plan  * Back pain of lumbar region with sciatica- (present on admission)  Assessment & Plan  Patient had 1 day of pain but denies any recent falls or trauma.  CT scan was negative  Pain controlled or never narcotics  and will monitor mental respiratory status closely  She does have a history of L3 to severe canal stenosis  MRI L spine deferred as patient may not tolerate exam; no acute neurologic findings  Recommend follow up with outpatient spine/pain specialist for continued epidural steroid injections  Continue pain control with oxycodone and tylenol  Home health ordered per family preference, with discharge back to [facility] with  tomorrow    Chronic diastolic heart failure (HCC)  Assessment & Plan  Compensated  Hold Lasix for now    Acute on chronic renal failure (HCC)- (present on admission)  Assessment & Plan  Monitor BMP and assess response  Avoid IV contrast/nephrotoxins/NSAIDs  Dose adjust meds for decreased GFR  Improved with IV fluids    Cardiac pacemaker in situ- (present on admission)  Assessment & Plan  Continue monitoring    Acquired hypothyroidism- (present on admission)  Assessment & Plan  Continue home thyroid medications  Thyroid levels normal    Chronic atrial fibrillation (HCC)- (present on admission)  Assessment & Plan  Rate controlled on atenolol  Not on anticoagulation         VTE prophylaxis: VTE Selection    I have performed a physical exam and reviewed and updated ROS and Plan today (6/21/2024). In review of yesterday's note (6/20/2024), there are no changes except as documented above.

## 2024-06-21 NOTE — PROGRESS NOTES
Assumed care of patient at 1900 from day RN. Patient is A&O x 1. Bed locked in the lowest position, 3 side rails up, call light is within reach, belongings at bedside. Pt reports pain level of 0 /10. Mobility 1 assist with FWW. Explained plan of care and safety precautions to pt, pt has no questions at this time. Hourly rounding is in place.

## 2024-06-21 NOTE — DISCHARGE PLANNING
ATTN: Case Management  RE: Referral for Home Health    As of 6/21/24, we have accepted the Home Health referral for the patient listed above.    A [facility] clinician will be out to see the patient within 48 hours. If you have any questions or concerns regarding the patient’s transition to Home Health, please do not hesitate to contact us at [phone].      We look forward to collaborating with you,  [facility] Home Health Team

## 2024-06-21 NOTE — DISCHARGE PLANNING
Case Management Discharge Planning    Admission Date: 6/18/2024  GMLOS: 2.9  ALOS: 3    6-Clicks ADL Score:    6-Clicks Mobility Score:    PT and/or OT Eval ordered: Yes  Post-acute Referrals Ordered: Yes  Post-acute Choice Obtained: No  Has referral(s) been sent to post-acute provider:  Yes    Anticipated Discharge Dispo: Discharge Disposition: D/T to home under HHA care in anticipation of covered skilled care (06)    DME Needed: No    Action(s) Taken:  Pt discussed during afternoon IDT rounds. Per MD, plan is for pt to discharge to [facility] with HH upon medical clearance after discussion with pt's daughter Reina.     ANTOINETTE RN requested DPA send referral to [facility]  per choice form. Pending acceptance.     ANTOINETTE RN spoke with Ketty at [place] [facility] ([phone]) who confirmed pt can return to facility upon discharge and stated they do accept pt's over the weekend.     Escalations Completed: None    Medically Clear: No    Next Steps:  ANTOINETTE RN to follow up with medical team to discuss discharge barriers or needs.     Barriers to Discharge: Medical clearance

## 2024-06-21 NOTE — DISCHARGE PLANNING
[facility] Transitional Care Coordination    Referral from: Dr Craig  Insurance Provider on Facesheet: SCP  Potential Rehab Diagnosis: Debility    Chart review indicates patient may have on going medical management and may have therapy needs to possibly meet inpatient rehab facility criteria with the goal of returning to community.    D/C support: daughter, resident at [place] [facility]     Physiatry consultation pended per protocol.     PMR consult pended, awaiting MRI and therapy evals as appropriate. TCC will follow.     Thank you for the referral.

## 2024-06-21 NOTE — CARE PLAN
The patient is Stable - Low risk of patient condition declining or worsening    Shift Goals  Clinical Goals: Pt will remain safe and remain free from falls  Patient Goals: confort  Family Goals: KESHA    Progress made toward(s) clinical / shift goals:  Pt remained in a safe environment with no complications or injuries obtained this shift. Pt was reoriented  and/or redirected as needed this shift.    Problem: Fall Risk  Goal: Patient will remain free from falls  Outcome: Progressing     Problem: Pain - Standard  Goal: Alleviation of pain or a reduction in pain to the patient’s comfort goal  Outcome: Progressing     Patient is not progressing towards the following goals:

## 2024-06-22 ENCOUNTER — PHARMACY VISIT (OUTPATIENT)
Dept: PHARMACY | Facility: MEDICAL CENTER | Age: 84
End: 2024-06-22
Payer: COMMERCIAL

## 2024-06-22 VITALS
TEMPERATURE: 96.5 F | SYSTOLIC BLOOD PRESSURE: 130 MMHG | RESPIRATION RATE: 18 BRPM | OXYGEN SATURATION: 97 % | BODY MASS INDEX: 31.49 KG/M2 | HEART RATE: 87 BPM | DIASTOLIC BLOOD PRESSURE: 75 MMHG | WEIGHT: 172.18 LBS

## 2024-06-22 PROCEDURE — A9270 NON-COVERED ITEM OR SERVICE: HCPCS | Performed by: HOSPITALIST

## 2024-06-22 PROCEDURE — A9270 NON-COVERED ITEM OR SERVICE: HCPCS | Performed by: STUDENT IN AN ORGANIZED HEALTH CARE EDUCATION/TRAINING PROGRAM

## 2024-06-22 PROCEDURE — 700102 HCHG RX REV CODE 250 W/ 637 OVERRIDE(OP): Performed by: STUDENT IN AN ORGANIZED HEALTH CARE EDUCATION/TRAINING PROGRAM

## 2024-06-22 PROCEDURE — 99239 HOSP IP/OBS DSCHRG MGMT >30: CPT | Performed by: STUDENT IN AN ORGANIZED HEALTH CARE EDUCATION/TRAINING PROGRAM

## 2024-06-22 PROCEDURE — RXMED WILLOW AMBULATORY MEDICATION CHARGE: Performed by: STUDENT IN AN ORGANIZED HEALTH CARE EDUCATION/TRAINING PROGRAM

## 2024-06-22 PROCEDURE — 700102 HCHG RX REV CODE 250 W/ 637 OVERRIDE(OP): Performed by: HOSPITALIST

## 2024-06-22 RX ORDER — OXYCODONE HYDROCHLORIDE 5 MG/1
5 TABLET ORAL EVERY 6 HOURS PRN
Qty: 20 TABLET | Refills: 0 | Status: SHIPPED | OUTPATIENT
Start: 2024-06-22 | End: 2024-06-27

## 2024-06-22 RX ADMIN — FLUTICASONE FUROATE, UMECLIDINIUM BROMIDE AND VILANTEROL TRIFENATATE 1 PUFF: 100; 62.5; 25 POWDER RESPIRATORY (INHALATION) at 07:00

## 2024-06-22 RX ADMIN — SERTRALINE HYDROCHLORIDE 50 MG: 50 TABLET ORAL at 07:00

## 2024-06-22 RX ADMIN — ATENOLOL 25 MG: 50 TABLET ORAL at 07:00

## 2024-06-22 RX ADMIN — OXYCODONE HYDROCHLORIDE 5 MG: 5 TABLET ORAL at 14:57

## 2024-06-22 RX ADMIN — HYDROXYZINE HYDROCHLORIDE 25 MG: 50 TABLET, FILM COATED ORAL at 03:46

## 2024-06-22 RX ADMIN — LEVOTHYROXINE SODIUM 50 MCG: 0.05 TABLET ORAL at 07:00

## 2024-06-22 RX ADMIN — LISINOPRIL 5 MG: 5 TABLET ORAL at 07:00

## 2024-06-22 RX ADMIN — OXYCODONE HYDROCHLORIDE 5 MG: 5 TABLET ORAL at 03:45

## 2024-06-22 ASSESSMENT — PAIN SCALES - PAIN ASSESSMENT IN ADVANCED DEMENTIA (PAINAD)
BREATHING: NORMAL
TOTALSCORE: 0
FACIALEXPRESSION: SMILING OR INEXPRESSIVE
CONSOLABILITY: NO NEED TO CONSOLE
TOTALSCORE: 6
BODYLANGUAGE: RELAXED
FACIALEXPRESSION: FACIAL GRIMACING
BODYLANGUAGE: TENSE, DISTRESSED PACING, FIDGETING
BODYLANGUAGE: RELAXED
BREATHING: OCCASIONAL LABORED BREATHING, SHORT PERIOD OF HYPERVENTILATION
FACIALEXPRESSION: SMILING OR INEXPRESSIVE
CONSOLABILITY: DISTRACTED OR REASSURED BY VOICE/TOUCH
CONSOLABILITY: NO NEED TO CONSOLE
TOTALSCORE: 0
NEGVOCALIZATION: OCCASIONAL MOAN/GROAN, LOW SPEECH, NEGATIVE/DISAPPROVING QUALITY
BREATHING: NORMAL

## 2024-06-22 ASSESSMENT — PAIN DESCRIPTION - PAIN TYPE
TYPE: ACUTE PAIN

## 2024-06-22 ASSESSMENT — FIBROSIS 4 INDEX: FIB4 SCORE: 2.08

## 2024-06-22 NOTE — CARE PLAN
The patient is Stable - Low risk of patient condition declining or worsening    Shift Goals  Clinical Goals: Pt will remain safe and be reoriented as needed this shift.  Patient Goals: rest  Family Goals: KESHA    Progress made toward(s) clinical / shift goals:  Pt remained in a safe environment with no complications or injuries obtained this shift. Pt was reoriented  and/or redirected as needed this shift.    Problem: Fall Risk  Goal: Patient will remain free from falls  Outcome: Progressing     Problem: Knowledge Deficit - Standard  Goal: Patient and family/care givers will demonstrate understanding of plan of care, disease process/condition, diagnostic tests and medications  Outcome: Progressing     Patient is not progressing towards the following goals:

## 2024-06-22 NOTE — PROGRESS NOTES
Assumed care of patient at 1900 from day RN. Patient is A&O x 1, not to time, place, situation. Bed locked in the lowest position, 3 side rails up, call light is within reach, belongings at bedside. Pt reports pain level of 6 /10, pt medicated, per MAR. Mobility 1 assist with FWW. Explained plan of care and safety precautions to pt, pt has no questions at this time. Hourly rounding is in place.

## 2024-06-22 NOTE — DISCHARGE PLANNING
Per CM SW, DPA set transport request to [facility]. Roberto Carlos has a  time for 15:00-15:30 for Pt. Confirmation # [id number] quote $[phone]    CM SW notified

## 2024-06-22 NOTE — CARE PLAN
The patient is Stable - Low risk of patient condition declining or worsening    Shift Goals  Clinical Goals: Patient will remain free of falls this shift.  Patient Goals: comfort, rest  Family Goals: KESHA    Progress made toward(s) clinical / shift goals:  Patient  educated on plan of care and verbalized understanding. Patient has remained free of falls this shift with appropriate fall precautions in place thorughout shift. Patient skin integrity maintained throughout shift.   Pt reported less anxiety this AM, able to rest in bed comfortably.    Problem: Knowledge Deficit - Standard  Goal: Patient and family/care givers will demonstrate understanding of plan of care, disease process/condition, diagnostic tests and medications  Outcome: Progressing     Problem: Fall Risk  Goal: Patient will remain free from falls  Outcome: Progressing     Problem: Psychosocial Needs:  Goal: Level of anxiety will decrease  Outcome: Progressing     Patient is not progressing towards the following goals:

## 2024-06-22 NOTE — DISCHARGE PLANNING
TCN following. HTH/SCP chart reviewed. Discussed with MATY Watson. No new TCN needs identified. Please see prior TCN note from 6/21 for most recent discharge planning considerations if indicated. Note pt slated for dc back to her [place] with [facility] (accepted) and transport has now been set as well.    Completed:  Choice obtained: IRF (first choice), HH ([facility] for resumption)   Pt/Family aware of [facility]'s blanket SNF referral policy.

## 2024-06-22 NOTE — DISCHARGE PLANNING
Pending MRI and therapy evals. Per hospitalist notes, patient and daughter prefer to return home w/ hh. TCC will continue to follow for determination.

## 2024-06-22 NOTE — DISCHARGE PLANNING
Case Management Discharge Planning    Admission Date: 6/18/2024  GMLOS: 2.9  ALOS: 4    6-Clicks ADL Score:    6-Clicks Mobility Score:    PT and/or OT Eval ordered: Yes  Post-acute Referrals Ordered: Yes  Post-acute Choice Obtained: Yes  Has referral(s) been sent to post-acute provider:  Yes      Anticipated Discharge Dispo: Discharge Disposition: D/T to home under HHA care in anticipation of covered skilled care (06)    DME Needed: No    Action(s) Taken: LMSW attempted to speak with patients daughter via phone. LMSW unable to leave  due to full mail box.     Addendum @ 1150: LMSW left a message with the nurse for a call back for this patient at [facility].     Addendum @ 0852: LMSW spoke with RN at [facility]. Patient okay to DC back to [facility], patient will need her Oxy to DC with as [facility] is unable to provide the new med for her. LMSW to request transport time for 1500 with [facility] back to [facility].     Escalations Completed: None    Medically Clear: Yes    Next Steps: LMSW to speak with David at [facility] regarding DC.     Barriers to Discharge: Pending Placement and Transportation    Is the patient up for discharge tomorrow: No

## 2024-06-22 NOTE — CARE PLAN
The patient is Stable - Low risk of patient condition declining or worsening    Shift Goals  Clinical Goals: pt safety, comfort  Patient Goals: comfort  Family Goals: salbador    Progress made toward(s) clinical / shift goals:        Problem: Fall Risk  Goal: Patient will remain free from falls  Outcome: Progressing     Pt free of falls, fall prevention measures in place and charted.    Problem: Skin Integrity  Goal: Skin integrity is maintained or improved  Outcome: Progressing     Skin breakdown prevention measures in place, no signs of new onset skin breakdown     Patient is not progressing towards the following goals:

## 2024-06-22 NOTE — PROGRESS NOTES
Assumed care of pt  at 0700. Report received from NOC RN. Pt is A&O x 1 oriented to self. Pt reoriented to place, time, and situation. Pt denies pain at this time. Resting comfortably with even respirations, awakens to sound. Pt is on RA, bed alarm in place for safety. Reinforced need to call for assistance. No signs or symptoms of pain or distress at this time.   Pt educated on how to use the call light, pt verbalized understanding. Bed in lowest locked position, call light within reach, hourly rounding in place. Labs reviewed, orders reviewed, communication board updated. Pt declines any further needs at this time

## 2024-06-23 ENCOUNTER — HOSPITAL ENCOUNTER (EMERGENCY)
Facility: MEDICAL CENTER | Age: 84
End: 2024-06-23
Attending: EMERGENCY MEDICINE
Payer: MEDICARE

## 2024-06-23 VITALS
HEIGHT: 62 IN | OXYGEN SATURATION: 97 % | BODY MASS INDEX: 31.28 KG/M2 | RESPIRATION RATE: 18 BRPM | DIASTOLIC BLOOD PRESSURE: 58 MMHG | HEART RATE: 81 BPM | WEIGHT: 170 LBS | SYSTOLIC BLOOD PRESSURE: 119 MMHG | TEMPERATURE: 96.5 F

## 2024-06-23 DIAGNOSIS — G89.29 CHRONIC LEFT HIP PAIN: ICD-10-CM

## 2024-06-23 DIAGNOSIS — M25.552 CHRONIC LEFT HIP PAIN: ICD-10-CM

## 2024-06-23 PROCEDURE — 700102 HCHG RX REV CODE 250 W/ 637 OVERRIDE(OP): Performed by: EMERGENCY MEDICINE

## 2024-06-23 PROCEDURE — A9270 NON-COVERED ITEM OR SERVICE: HCPCS | Performed by: EMERGENCY MEDICINE

## 2024-06-23 PROCEDURE — 99284 EMERGENCY DEPT VISIT MOD MDM: CPT

## 2024-06-23 RX ORDER — OXYCODONE HYDROCHLORIDE 5 MG/1
5 TABLET ORAL ONCE
Status: COMPLETED | OUTPATIENT
Start: 2024-06-23 | End: 2024-06-23

## 2024-06-23 RX ADMIN — OXYCODONE HYDROCHLORIDE 5 MG: 5 TABLET ORAL at 20:39

## 2024-06-23 ASSESSMENT — FIBROSIS 4 INDEX: FIB4 SCORE: 2.08

## 2024-06-23 NOTE — DISCHARGE SUMMARY
"Discharge Summary    CHIEF COMPLAINT ON ADMISSION  Chief Complaint   Patient presents with    Hip Pain     L hip pain. Pt had MGLF and was evaluated prior to today. Pt states pain increasing to L hip. Tearful during triage. GCS 14 a/o x 2.       Reason for Admission  ems     Admission Date  6/18/2024    CODE STATUS  Prior    HPI & HOSPITAL COURSE  Donte Magaña is a 83 y.o. female who presented 6/18/2024 with past medical history of atrial fibrillation, pacemaker, hypothyroidism, dementia, lupus, sacral ulcers who presents to the hospital for lower back pain. Patient with history of chronic low back pain, spinal stenosis. She reported back pain shooting down left leg with associated weakness. No urinary or bowel incontinence. Patient with no focal deficits on exam. CT lumbar spine with no acute findings. MRI lumbar spine ordered to evaluate further however felt patient would not be able to tolerate MRI exam and so deferred. Patients symptoms controlled with pain medication oxycodone. Patient and patients daughter feel patient would be more comfortable discharging back to assisted living facility with home health rather than discharging to SNF. Patient is advised to follow up with her spine/pain specialist to continue epidural steroid injections as she has had in the past. Patient feeling well, home health ordered. [facility] is [facility].      Therefore, she is discharged in fair and stable condition to home with organized home healthcare and close outpatient follow-up.    The patient met 2-midnight criteria for an inpatient stay at the time of discharge.    Discharge Date  6/22/2024    FOLLOW UP ITEMS POST DISCHARGE  Take medications as prescribed.  Follow up with PCP and pain/spine specialist.    DISCHARGE DIAGNOSES  Principal Problem:    Back pain of lumbar region with sciatica (POA: Yes)  Active Problems:    Chronic atrial fibrillation (HCC) (POA: Yes)      Overview: SCP-DRAKE. \"Need to include - " "controlled with current medications or give       update on current work up/treatment\"            Protimes managed by [facility] Sees Dr [facility]wood at [facility]       Physicians      Cardioversion 11/30/2010, sotolol/coumadin (highly atopic -- sensitive),       intolerant of metoprolol, coreg, dilt, multaq    Acquired hypothyroidism (POA: Yes)    Cardiac pacemaker in situ (POA: Yes)    Acute on chronic renal failure (HCC) (POA: Yes)    Chronic diastolic heart failure (HCC) (POA: Unknown)  Resolved Problems:    * No resolved hospital problems. *      FOLLOW UP  Future Appointments   Date Time Provider Department Center   7/15/2024  4:20 PM Jolie Escobar M.D. [facility]     No follow-up provider specified.    MEDICATIONS ON DISCHARGE     Medication List        START taking these medications        Instructions   oxyCODONE immediate-release 5 MG Tabs  Commonly known as: Roxicodone   Take 1 Tablet by mouth every 6 hours as needed for Severe Pain for up to 5 days.  Dose: 5 mg            CONTINUE taking these medications        Instructions   acetaminophen 500 MG Tabs  Commonly known as: Tylenol   Take 1,000 mg by mouth 3 times a day as needed for Mild Pain. 2 tablets = 1,000 mg.  Indications: Pain  Dose: 1,000 mg     atenolol 25 MG Tabs  Commonly known as: Tenormin   Take 1 Tablet by mouth every day. Indications: High Blood Pressure Disorder  Dose: 25 mg     fluticasone-umeclidinium-vilanterol 100-62.5-25 mcg/act inhaler  Commonly known as: Trelegy Ellipta   Inhale 1 Puff every day.  Dose: 1 Puff     furosemide 20 MG Tabs  Commonly known as: Lasix   Take 1 Tablet by mouth every day.  Dose: 20 mg     Home Care Oxygen   Inhale 2 L/min continuous. Oxygen dose range: 2 L/min  Respiratory route via: Nasal Cannula   Oxygen supplier: Red  PATIENT STATES SHE ONLY USES THE OXYGEN PRN      Indications: hypoxia  Dose: 2 L/min     levothyroxine 50 MCG Tabs  Commonly known as: Synthroid   Take 50 mcg by mouth " "every morning on an empty stomach. Indications: Underactive Thyroid  Dose: 50 mcg     lidocaine 5 % Ptch  Commonly known as: Lidoderm   Place 1 Patch on the skin every 24 hours.  Dose: 1 Patch     lisinopril 5 MG Tabs  Commonly known as: Prinivil   Take 5 mg by mouth every day. Indications: High Blood Pressure Disorder  Dose: 5 mg     magnesium oxide 400 MG Tabs tablet  Commonly known as: Mag-Ox   Take 400 mg by mouth every day. Indications: supplement  Dose: 400 mg     Myrbetriq 50 MG Tb24  Generic drug: Mirabegron ER   Take 50 mg by mouth every day. Indications: Overactive Bladder  Dose: 50 mg     sertraline 50 MG Tabs  Commonly known as: Zoloft   Take 1 Tablet by mouth every day. Indications: Major Depressive Disorder  Dose: 50 mg     Xarelto 20 MG Tabs tablet  Generic drug: rivaroxaban   Take 20 mg by mouth with dinner. Indications: Atrial Fibrillation with Blood Vessel Occlusion Process, Disease of the Peripheral Arteries  Dose: 20 mg              Allergies  Allergies   Allergen Reactions    Amiodarone Hives     Throat and tongue itching    Bactrim Shortness of Breath    Cipro Xr Swelling    Metoprolol Swelling     Causes throat swelling    Morphine Unspecified     Hallucinations    Phytoplex Z-Guard [Petrolatum-Zinc Oxide] Unspecified     \"causes burning\"    Pseudoephedrine Palpitations    Qvar [Beclomethasone Dipropionate] Unspecified     Pressure on heart      Vibramycin Shortness of Breath    Atorvastatin Calcium-Polysorbate 80 Unspecified     Muscle aches      Augmentin Unspecified     Unknown reaction    Diltiazem Rash     rash    Flecainide Unspecified     dizziness    Keflex Unspecified     Pt states \"Unsure\".    Mucinex Unspecified     GI Distress      Tramadol Unspecified     crying    Atorvastatin Myalgia    Tape Rash     Paper tape okay       DIET  No orders of the defined types were placed in this encounter.      ACTIVITY  As tolerated.  Weight bearing as " tolerated    CONSULTATIONS  none    PROCEDURES  none    LABORATORY  Lab Results   Component Value Date    SODIUM 142 06/20/2024    POTASSIUM 3.7 06/20/2024    CHLORIDE 104 06/20/2024    CO2 25 06/20/2024    GLUCOSE 129 (H) 06/20/2024    BUN 22 06/20/2024    CREATININE 0.87 06/20/2024    CREATININE 0.78 07/29/2010    GLOMRATE >59 07/29/2010        Lab Results   Component Value Date    WBC 8.1 06/20/2024    WBC 9.3 07/29/2010    HEMOGLOBIN 14.0 06/20/2024    HEMATOCRIT 41.8 06/20/2024    PLATELETCT 232 06/20/2024        Total time of the discharge process exceeds 34 minutes.

## 2024-06-24 ENCOUNTER — HOME CARE VISIT (OUTPATIENT)
Dept: HOME HEALTH SERVICES | Facility: HOME HEALTHCARE | Age: 84
End: 2024-06-24
Payer: MEDICARE

## 2024-06-24 ENCOUNTER — PATIENT OUTREACH (OUTPATIENT)
Dept: MEDICAL GROUP | Facility: MEDICAL CENTER | Age: 84
End: 2024-06-24
Payer: MEDICARE

## 2024-06-24 NOTE — ED NOTES
The pt reports reduced pain but still reports pain while moving left hip. Vitals stable on the monitor. The pt is alert and oriented.

## 2024-06-24 NOTE — DISCHARGE PLANNING
Medical Social Work    MSW spoke with bedside RN who states that pt is in need of transport back to her assisted living facility: [facility] (room 237).  RN states that facility is already aware of pt's return.  MSW reviewed pt's chart and pt transport is generally arranged through [facility] due to fall risk and [employer] insurance.  MSW contacted [place] with [facility] to arrange wheelchair transport for [phone] (trip # [id number] with an estimated cost of $137.76).  Approved service form completed. Bedside RN made aware of transport time.

## 2024-06-24 NOTE — ED PROVIDER NOTES
"  ER Provider Note    Scribed for Liat Martínez M.D. by Radha Moore. 6/23/2024  7:23 PM    Primary Care Provider: Jolie Escobar M.D.    CHIEF COMPLAINT  Chief Complaint   Patient presents with    Hip Pain     Chronic L hip pain, denies recent fall but is poor historian, seen in this ER recent for the same complaint, pt able to bear weight with assistance      LIMITATION TO HISTORY   Select: Poor historian    HPI/ROS  OUTSIDE HISTORIAN(S):  None    EXTERNAL RECORDS REVIEWED  Care everywhere Patient with a history of arthritis and spinal stenosis was discharged last night after being seen for lower back pain. She received CT lumbar and hip imaging which showed no abnormal findings. She was discharged with oxycodone.    Donte Magaña is a 83 y.o. female who presents to the ED for hip pain. She reports no other changes in her symptoms since her visit and discharge yesterday. She reports not receiving adequate medication from staff at her facility.  She denies any trauma or new components to this.  This is the same pain that she has been dealing with and she was admitted for.    PAST MEDICAL HISTORY  Past Medical History:   Diagnosis Date    A-fib (HCC)     Anesthesia     \"Tachycardia for 5 days after cataract surgery\"    Anticoagulant long-term use 1/12/2012    Arthritis     osteo-Knees, hips    Atrial fibrillation (HCC)     Backpain     R hip    Bowel habit changes     diarrhea    Breath shortness     with exertion, prn O2 2L    Bronchitis Nov, 2013    CAD (coronary artery disease)     Depression     Glaucoma 5/3/2011    Hematoma complicating a procedure 11/3/2012    Hemorrhagic disorder (HCC)     bruising/coumadin    Hypertension     Hypothyroid     Lupus (HCC)     Macular degeneration     Menopause 1/12/2012    Mitral regurgitation 10/30/2012    Obesity 1/12/2012    Pacemaker 2018    Pneumonia feb,2013    Pre-syncope 6/29/2018    Pulmonary hypertension (HCC) 10/30/2012    PVC's (premature ventricular " contractions) 1/12/2012    Senile nuclear sclerosis     Spinal stenosis of lumbar region at multiple levels     Unspecified cataract     repaired bilateral    Unspecified urinary incontinence     Urinary bladder disorder        SURGICAL HISTORY  Past Surgical History:   Procedure Laterality Date    WA COMBINED ANT/POST COLPORRHAPHY  1/14/2020    Procedure: COLPORRHAPHY, COMBINED ANTEROPOSTERIOR - PERINEOPLASTY;  Surgeon: Waqas Robin M.D.;  Location: SURGERY SAME DAY [facility];  Service: Gynecology    WA LAP,DIAGNOSTIC ABDOMEN  1/14/2020    Procedure: PELVISCOPY;  Surgeon: Waqas Robin M.D.;  Location: SURGERY SAME DAY [facility];  Service: Gynecology    ENTEROCELE REPAIR  1/14/2020    Procedure: REPAIR, ENTEROCELE;  Surgeon: Waqas Robin M.D.;  Location: SURGERY SAME DAY [facility];  Service: Gynecology    BLADDER SLING FEMALE  1/14/2020    Procedure: BLADDER SLING, FEMALE - TOT;  Surgeon: Waqas Robin M.D.;  Location: SURGERY SAME DAY [facility];  Service: Gynecology    VAGINAL SUSPENSION  1/14/2020    Procedure: COLPOPEXY - SACROSPINOUS VAULT SUSPENSION;  Surgeon: Waqas Robin M.D.;  Location: SURGERY SAME DAY [facility];  Service: Gynecology    SALPINGO OOPHORECTOMY Bilateral 1/14/2020    Procedure: SALPINGO-OOPHORECTOMY;  Surgeon: Waqas Robin M.D.;  Location: SURGERY SAME DAY [facility];  Service: Gynecology    IRRIGATION & DEBRIDEMENT ORTHO Right 2/14/2019    Procedure: IRRIGATION & DEBRIDEMENT ORTHO-HIP WOUND ;  Surgeon: Vladimir Lee M.D.;  Location: [facility];  Service: Orthopedics    HIP ARTH ANTERIOR TOTAL Right 1/17/2019    Procedure: HIP ARTHROPLASTY ANTERIOR TOTAL;  Surgeon: Juan C Mercedes M.D.;  Location: [facility];  Service: Orthopedics    PACEMAKER INSERTION  06/30/2018    Dual Chamber    KNEE ARTHROPLASTY TOTAL Right 6/23/2016    Procedure: KNEE ARTHROPLASTY TOTAL;  Surgeon: Heriberto Lozada M.D.;  Location: [facility]  ORS;  Service:     KNEE ARTHROPLASTY TOTAL Left 5/28/2015    Procedure: KNEE ARTHROPLASTY TOTAL;  Surgeon: Heriberto Lozada M.D.;  Location: SURGERY [facility];  Service:     CATARACT PHACO WITH IOL Right 5/5/2015    Procedure: IOL OD - STANDARD;  Surgeon: Dmitry Bejarano M.D.;  Location: SURGERY [facility];  Service:     CATARACT PHACO WITH IOL  4/21/2015    Performed by Dmitry Bejarano M.D. at SURGERY [facility] ORS    RECOVERY  11/30/2010    Performed by SURGERY, [facility]-RECOVERY at SURGERY SAME DAY [facility]    COLONOSCOPY  2008    Normal    [facility]    ABDOMINAL HYSTERECTOMY TOTAL  April 15,1975    still has ovaries    OPEN REDUCTION      left ankle    OTHER      Removed pins from left ankle    OTHER CARDIAC SURGERY  12/2017 and 07/19/2018     Cardiac Ablation    TONSILLECTOMY AND ADENOIDECTOMY         FAMILY HISTORY  Family History   Problem Relation Age of Onset    Stroke Mother     Diabetes Father     Stroke Sister     Heart Disease Brother     Stroke Sister     GI Disease Daughter         Crohn's Disease    Heart Disease Daughter         CHF    Other Daughter         Chronic Pain--Lymphedema    Cancer Paternal Aunt         breast       SOCIAL HISTORY   reports that she has never smoked. She has never used smokeless tobacco. She reports that she does not drink alcohol and does not use drugs.    CURRENT MEDICATIONS  Previous Medications    ACETAMINOPHEN (TYLENOL) 500 MG TAB    Take 1,000 mg by mouth 3 times a day as needed for Mild Pain. 2 tablets = 1,000 mg.  Indications: Pain    ATENOLOL (TENORMIN) 25 MG TAB    Take 1 Tablet by mouth every day. Indications: High Blood Pressure Disorder    FLUTICASONE-UMECLIDINIUM-VILANTEROL (TRELEGY ELLIPTA) 100-62.5-25 MCG/ACT INHALER    Inhale 1 Puff every day.    FUROSEMIDE (LASIX) 20 MG TAB    Take 1 Tablet by mouth every day.    HOME CARE OXYGEN    Inhale 2 L/min continuous. Oxygen dose range: 2 L/min  Respiratory route via: Nasal Cannula   Oxygen  "supplier: Red  PATIENT STATES SHE ONLY USES THE OXYGEN PRN      Indications: hypoxia    LEVOTHYROXINE (SYNTHROID) 50 MCG TAB    Take 50 mcg by mouth every morning on an empty stomach. Indications: Underactive Thyroid    LIDOCAINE (LIDODERM) 5 % PATCH    Place 1 Patch on the skin every 24 hours.    LISINOPRIL (PRINIVIL) 5 MG TAB    Take 5 mg by mouth every day. Indications: High Blood Pressure Disorder    MAGNESIUM OXIDE (MAG-OX) 400 MG TAB TABLET    Take 400 mg by mouth every day. Indications: supplement    MIRABEGRON ER (MYRBETRIQ) 50 MG TABLET SR 24 HR    Take 50 mg by mouth every day. Indications: Overactive Bladder    OXYCODONE IMMEDIATE-RELEASE (ROXICODONE) 5 MG TAB    Take 1 Tablet by mouth every 6 hours as needed for Severe Pain for up to 5 days.    RIVAROXABAN (XARELTO) 20 MG TAB TABLET    Take 20 mg by mouth with dinner. Indications: Atrial Fibrillation with Blood Vessel Occlusion Process, Disease of the Peripheral Arteries    SERTRALINE (ZOLOFT) 50 MG TAB    Take 1 Tablet by mouth every day. Indications: Major Depressive Disorder       ALLERGIES  Amiodarone, Bactrim, Cipro xr, Metoprolol, Morphine, Phytoplex z-guard [petrolatum-zinc oxide], Pseudoephedrine, Qvar [beclomethasone dipropionate], Vibramycin, Atorvastatin calcium-polysorbate 80, Augmentin, Diltiazem, Flecainide, Keflex, Mucinex, Tramadol, Atorvastatin, and Tape    PHYSICAL EXAM  /53   Pulse 80   Temp 36.4 °C (97.5 °F) (Temporal)   Resp 16   Ht 1.575 m (5' 2\")   Wt 77.1 kg (170 lb)   LMP 01/10/1975   SpO2 92%   BMI 31.09 kg/m²   Constitutional: Alert in no apparent distress.  HENT: Normocephalic, Atraumatic, Bilateral external ears normal. Nose normal.   Eyes:  Conjunctiva normal, non-icteric.   Heart: Regular rate and rhythm, no murmurs.   Lungs: Non-labored respirations  Skin: Warm, Dry, No erythema, No rash.   Neurologic: Alert, Grossly non-focal.   Psychiatric: Affect normal, Judgment normal, Mood normal, Appears appropriate " and not intoxicated.   Extremities: Intact distal pulses, No edema, No tenderness.          COURSE & MEDICAL DECISION MAKING    ED Observation Status? No; Patient does not meet criteria for ED Observation.     7:23 PM - Patient seen and evaluated at bedside. I discussed plan for discharge and follow up as outlined below. The patient is stable for discharge at this time and will return for any new or worsening symptoms. Patient verbalizes understanding and support with my plan for discharge.       INITIAL ASSESSMENT AND PLAN  Care Narrative: This is an 83-year-old who has chronic back and hip pain.  She was just admitted for a workup for this and ultimately had negative imaging.  Her pain was controlled on oral regimen and she was discharged last night.  There is no new component to this I do not think she needs another hospitalization given she just had imaging and workup.  She needs outpatient follow-up with her pain control doctors and she will be discharged.                   DISPOSITION AND DISCUSSIONS  I have discussed management of the patient with the following physicians and DANNY's: None    Discussion of management with other QHP or appropriate source(s): None     Escalation of care considered, and ultimately not performed: acute inpatient care management, however at this time, the patient is most appropriate for outpatient management.    Barriers to care at this time, including but not limited to:  None noted .     Decision tools and prescription drugs considered including, but not limited to: Pain Medications   .    The patient will return for new or worsening symptoms and is stable at the time of discharge. Patient was given return precautions. Patient and/or family member verbalizes understanding and will comply.    DISPOSITION:  Patient will be discharged home in stable condition.    FOLLOW UP:  Jolie Escobar M.D.  [address] [place] [address]   [facility] [address]  [phone]    Schedule an  appointment as soon as possible for a visit       [facility] Emergency Dept  [address] [facility] [address] [phone]    Return for fevers weakness or other concerns.    FINAL IMPRESSION   1. Chronic left hip pain        Radha HERNANDEZ (Scribe), am scribing for, and in the presence of, Liat Martínez M.D..    Electronically signed by: Radha Moore (Scribe), 6/23/2024    ILiat M.D. personally performed the services described in this documentation, as scribed by Radha Moore in my presence, and it is both accurate and complete.    The note accurately reflects work and decisions made by me.  Liat Martínez M.D.  6/23/2024  9:31 PM

## 2024-06-24 NOTE — ED NOTES
"Pt discharged home with GMT, back to [facility]. The pt is alert and oriented, calm and cooperative, talks with clear coherent speech, moves extremities. The pt needed assistance moving from gurney to wheelchair, the pt reports pain during movement. Vitals stable on the monitor. IV discontinued and gauze placed, pt in possession of belongings. Pt provided discharge education and information pertaining to medications and follow up appointments. Pt received copy of discharge instructions and verbalized understanding. /58   Pulse 81   Temp 35.8 °C (96.5 °F) (Temporal)   Resp 18   Ht 1.575 m (5' 2\")   Wt 77.1 kg (170 lb)   LMP 01/10/1975   SpO2 97%   BMI 31.09 kg/m²     "

## 2024-06-24 NOTE — ED TRIAGE NOTES
"Chief Complaint   Patient presents with    Hip Pain     Chronic L hip pain, denies recent fall but is poor historian, seen in this ER recent for the same complaint, pt able to bear weight with assistance      Pt BIB [facility] for above complaints, VSS on 2L s/p 25mcg fentanyl from EMS, GCS 15, pt is tearful from pain. Pt states she also took a dose of home pain meds PTA.    Recent admission and dc from this hospital for the same complaint, established home health at that time which has been maintained.     /53   Pulse 80   Temp 36.4 °C (97.5 °F) (Temporal)   Resp 16   Ht 1.575 m (5' 2\")   Wt 77.1 kg (170 lb)   LMP 01/10/1975   SpO2 92%   BMI 31.09 kg/m²     "

## 2024-06-24 NOTE — CASE COMMUNICATION
Quality Review Completed for Transfer OASIS by LILLIE Stockton, DANNY on 6/24/2024:     Edits completed by LILLIE Stockton RN:  1.  is NA to Pressure ulcer prevention since the AMADO, LUIS ABLERTO was 17 and not on care plan per care plan interventions

## 2024-06-24 NOTE — ED NOTES
The pt is alert and oriented sitting in bed. The pt reports hip pain. Vitals stable on the monitor

## 2024-06-24 NOTE — ED NOTES
Bedside report received from off going RN/tech: Armand, assumed care of patient.  POC discussed with patient. Call light within reach, all needs addressed at this time.       Fall risk interventions in place: Move the patient closer to the nurse's station, Patient's personal possessions are with in their safe reach, Place socks on patient, Place fall risk sign on patient's door, Keep floor surfaces clean and dry, and Accompanied to restroom (all applicable per [facility] Fall risk assessment)   Continuous monitoring: Pulse Ox or Blood Pressure  IVF/IV medications: Not Applicable   Oxygen: How many liters 2L, Traced the line to wall oxygen, and No oxygen tank in room  Bedside sitter: Not Applicable   Isolation: Not Applicable

## 2024-06-25 ENCOUNTER — HOME CARE VISIT (OUTPATIENT)
Dept: HOME HEALTH SERVICES | Facility: HOME HEALTHCARE | Age: 84
End: 2024-06-25
Payer: MEDICARE

## 2024-06-25 ENCOUNTER — DOCUMENTATION (OUTPATIENT)
Dept: VASCULAR LAB | Facility: MEDICAL CENTER | Age: 84
End: 2024-06-25
Payer: MEDICARE

## 2024-06-25 VITALS
TEMPERATURE: 98 F | DIASTOLIC BLOOD PRESSURE: 42 MMHG | OXYGEN SATURATION: 91 % | HEART RATE: 80 BPM | RESPIRATION RATE: 18 BRPM | SYSTOLIC BLOOD PRESSURE: 96 MMHG

## 2024-06-25 PROCEDURE — G0299 HHS/HOSPICE OF RN EA 15 MIN: HCPCS

## 2024-06-25 PROCEDURE — G0179 MD RECERTIFICATION HHA PT: HCPCS | Performed by: STUDENT IN AN ORGANIZED HEALTH CARE EDUCATION/TRAINING PROGRAM

## 2024-06-25 SDOH — ECONOMIC STABILITY: HOUSING INSECURITY: EVIDENCE OF SMOKING MATERIAL: 0

## 2024-06-25 ASSESSMENT — ENCOUNTER SYMPTOMS
HYPOTENSION: 1
SUBJECTIVE PAIN PROGRESSION: UNCHANGED
DEPRESSED MOOD: 1
DIFFICULTY THINKING: 1
DYSPNEA ON EXERTION: 1
DEBILITATING PAIN: 1
DEPRESSION: 1
DYSPNEA ACTIVITY LEVEL: AFTER AMBULATING 10 - 20 FT
PERSON REPORTING PAIN: PATIENT
PAIN LOCATION - PAIN FREQUENCY: CONSTANT
FORGETFULNESS: 1
FATIGUES EASILY: 1
SHORTNESS OF BREATH: 1
DIZZINESS: 1
PAIN LOCATION - EXACERBATING FACTORS: MOVEMENT
PAIN LOCATION: RIGHT HIP
FATIGUE: 1
PAIN: 1

## 2024-06-25 ASSESSMENT — ACTIVITIES OF DAILY LIVING (ADL): OASIS_M1830: 06

## 2024-06-25 NOTE — PROGRESS NOTES
Medication chart review for [facility] services    Received referral from [facility].   Medications reviewed  compared with discharge summary if available.  Discharge summary date, if applicable:   6/22/24    Current medication list per [facility]     Medication list one, patient is currently taking    Current Outpatient Medications:     oxyCODONE immediate-release, 5 mg, Oral, Q6HRS PRN    lidocaine, 1 Patch, Transdermal, Q24HRS (Patient not taking: Reported on 6/25/2024)    Home Care Oxygen, 2 L/min, Inhalation, Continuous    fluticasone-umeclidinium-vilanterol, 1 Puff, Inhalation, DAILY    atenolol, 25 mg, Oral, DAILY    furosemide, 20 mg, Oral, DAILY    acetaminophen, 1,000 mg, Oral, TID PRN    levothyroxine, 50 mcg, Oral, AM ES    magnesium oxide, 400 mg, Oral, DAILY    rivaroxaban, 20 mg, Oral, PM MEAL    lisinopril, 5 mg, Oral, DAILY    Myrbetriq, 50 mg, Oral, DAILY    sertraline, 50 mg, Oral, QDAY      Medication list two, drugs that the patient has been prescribed or recommended to take by their healthcare provider on discharge summary  Medication List          START taking these medications         Instructions   oxyCODONE immediate-release 5 MG Tabs  Commonly known as: Roxicodone    Take 1 Tablet by mouth every 6 hours as needed for Severe Pain for up to 5 days.  Dose: 5 mg                CONTINUE taking these medications         Instructions   acetaminophen 500 MG Tabs  Commonly known as: Tylenol    Take 1,000 mg by mouth 3 times a day as needed for Mild Pain. 2 tablets = 1,000 mg.  Indications: Pain  Dose: 1,000 mg      atenolol 25 MG Tabs  Commonly known as: Tenormin    Take 1 Tablet by mouth every day. Indications: High Blood Pressure Disorder  Dose: 25 mg      fluticasone-umeclidinium-vilanterol 100-62.5-25 mcg/act inhaler  Commonly known as: Trelegy Ellipta    Inhale 1 Puff every day.  Dose: 1 Puff      furosemide 20 MG Tabs  Commonly known as: Lasix    Take 1 Tablet by mouth every  "day.  Dose: 20 mg      Home Care Oxygen    Inhale 2 L/min continuous. Oxygen dose range: 2 L/min  Respiratory route via: Nasal Cannula   Oxygen supplier: [employer]  PATIENT STATES SHE ONLY USES THE OXYGEN PRN       Indications: hypoxia  Dose: 2 L/min      levothyroxine 50 MCG Tabs  Commonly known as: Synthroid    Take 50 mcg by mouth every morning on an empty stomach. Indications: Underactive Thyroid  Dose: 50 mcg      lidocaine 5 % Ptch  Commonly known as: Lidoderm    Place 1 Patch on the skin every 24 hours.  Dose: 1 Patch      lisinopril 5 MG Tabs  Commonly known as: Prinivil    Take 5 mg by mouth every day. Indications: High Blood Pressure Disorder  Dose: 5 mg      magnesium oxide 400 MG Tabs tablet  Commonly known as: Mag-Ox    Take 400 mg by mouth every day. Indications: supplement  Dose: 400 mg      Myrbetriq 50 MG Tb24  Generic drug: Mirabegron ER    Take 50 mg by mouth every day. Indications: Overactive Bladder  Dose: 50 mg      sertraline 50 MG Tabs  Commonly known as: Zoloft    Take 1 Tablet by mouth every day. Indications: Major Depressive Disorder  Dose: 50 mg      Xarelto 20 MG Tabs tablet  Generic drug: rivaroxaban    Take 20 mg by mouth with dinner. Indications: Atrial Fibrillation with Blood Vessel Occlusion Process, Disease of the Peripheral Arteries  Dose: 20 mg           Allergies   Allergen Reactions    Amiodarone Hives     Throat and tongue itching    Bactrim Shortness of Breath    Cipro Xr Swelling    Metoprolol Swelling     Causes throat swelling    Morphine Unspecified     Hallucinations    Phytoplex Z-Guard [Petrolatum-Zinc Oxide] Unspecified     \"causes burning\"    Pseudoephedrine Palpitations    Qvar [Beclomethasone Dipropionate] Unspecified     Pressure on heart      Vibramycin Shortness of Breath    Atorvastatin Calcium-Polysorbate 80 Unspecified     Muscle aches      Augmentin Unspecified     Unknown reaction    Diltiazem Rash     rash    Flecainide Unspecified     dizziness    Keflex " "Unspecified     Pt states \"Unsure\".    Mucinex Unspecified     GI Distress      Tramadol Unspecified     crying    Atorvastatin Myalgia    Tape Rash     Paper tape okay       Labs     Lab Results   Component Value Date/Time    SODIUM 142 06/20/2024 02:38 AM    POTASSIUM 3.7 06/20/2024 02:38 AM    CHLORIDE 104 06/20/2024 02:38 AM    CO2 25 06/20/2024 02:38 AM    GLUCOSE 129 (H) 06/20/2024 02:38 AM    BUN 22 06/20/2024 02:38 AM    CREATININE 0.87 06/20/2024 02:38 AM    CREATININE 0.78 07/29/2010 12:00 AM    BUNCREATRAT 17 07/29/2010 12:00 AM    GLOMRATE >59 07/29/2010 12:00 AM     Lab Results   Component Value Date/Time    ALKPHOSPHAT 79 06/20/2024 02:38 AM    ASTSGOT 17 06/20/2024 02:38 AM    ALTSGPT 9 06/20/2024 02:38 AM    TBILIRUBIN 0.5 06/20/2024 02:38 AM    INR 1.53 (H) 05/04/2024 02:08 PM    ALBUMIN 3.6 06/20/2024 02:38 AM        Assessment for clinically significant drug interactions, drug omissions/additions, duplicative therapies.            CC   Jolie Escobar M.D.  [address]  [facility] [address]  Fax: [phone]    [facility]  Phone [phone] fax [phone]    This note was created using voice recognition software (Dragon). The accuracy of the dictation is limited by the abilities of the software. I have reviewed the note prior to signing, however some errors in grammar and context are still possible. If you have any questions related to this note please do not hesitate to contact our office.   "

## 2024-06-25 NOTE — CASE COMMUNICATION
I AGREE WITH THE CHANGES    ----- Message -----  From: Angelica Stockton R.N.  Sent: 6/24/2024   4:03 PM PDT  To: Sneha Medina R.N.      Quality Review Completed for Transfer OASIS by LILLIE Stockton, RN on 6/24/2024:     Edits completed by LILLIE Stockton RN:  1.  is NA to Pressure ulcer prevention since the AMADO, LUIS ALBERTO was 17 and not on care plan per care plan interventions

## 2024-06-25 NOTE — PROGRESS NOTES
Transitional Care Management    Two attempts were made to contact this patient within two business days to review discharge per CMS guidelines, but were unsuccessful.   
negative...

## 2024-06-26 ENCOUNTER — HOSPITAL ENCOUNTER (EMERGENCY)
Facility: MEDICAL CENTER | Age: 84
End: 2024-06-26
Attending: EMERGENCY MEDICINE
Payer: MEDICARE

## 2024-06-26 ENCOUNTER — APPOINTMENT (OUTPATIENT)
Dept: RADIOLOGY | Facility: MEDICAL CENTER | Age: 84
End: 2024-06-26
Attending: EMERGENCY MEDICINE
Payer: MEDICARE

## 2024-06-26 ENCOUNTER — HOME CARE VISIT (OUTPATIENT)
Dept: HOME HEALTH SERVICES | Facility: HOME HEALTHCARE | Age: 84
End: 2024-06-26
Payer: MEDICARE

## 2024-06-26 VITALS
TEMPERATURE: 98.8 F | HEIGHT: 62 IN | BODY MASS INDEX: 31.28 KG/M2 | SYSTOLIC BLOOD PRESSURE: 114 MMHG | DIASTOLIC BLOOD PRESSURE: 52 MMHG | RESPIRATION RATE: 18 BRPM | OXYGEN SATURATION: 100 % | HEART RATE: 84 BPM | WEIGHT: 170 LBS

## 2024-06-26 DIAGNOSIS — S09.90XA CLOSED HEAD INJURY, INITIAL ENCOUNTER: ICD-10-CM
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
## 2024-06-26 DIAGNOSIS — S90.01XA CONTUSION OF RIGHT ANKLE, INITIAL ENCOUNTER: ICD-10-CM

## 2024-06-26 PROCEDURE — 73610 X-RAY EXAM OF ANKLE: CPT | Mod: RT

## 2024-06-26 PROCEDURE — 70450 CT HEAD/BRAIN W/O DYE: CPT

## 2024-06-26 PROCEDURE — 99284 EMERGENCY DEPT VISIT MOD MDM: CPT

## 2024-06-26 ASSESSMENT — FIBROSIS 4 INDEX: FIB4 SCORE: 2.08

## 2024-06-26 NOTE — ED TRIAGE NOTES
".  Chief Complaint   Patient presents with    GLF     Pt BIBA from [facility] for unwitnessed MGLF off toilet. Per ems/[facility] staff pt has been to ED recently for \"forcing themself to fall\". Pt (+) for BT and HS per pt.     Pt a&o x4 with no pain at this time.      ./54   Pulse 80   Temp 37.1 °C (98.8 °F) (Temporal)   Ht 1.575 m (5' 2\")   Wt 77.1 kg (170 lb)   LMP 01/10/1975   SpO2 96%   BMI 31.09 kg/m²     "

## 2024-06-26 NOTE — ED PROVIDER NOTES
"ED Provider Note    CHIEF COMPLAINT  Chief Complaint   Patient presents with    GLF     Pt BIBA from [facility] for unwitnessed MGLF off toilet. Per ems/[facility] staff pt has been to ED recently for \"forcing themself to fall\". Pt (+) for BT and HS per pt.     Pt a&o x4 with no pain at this time.        EXTERNAL RECORDS REVIEWED  Other inpatient note from recent fall and left hip pain also has had several recent emergency department visits for falls.    HPI/ROS  LIMITATION TO HISTORY   Chronic dementia  OUTSIDE HISTORIAN(S):      Donte Magaña is a 83 y.o. female who presents to the emergency department chief complaint of fall.  Patient was on the commode this evening she reports she fell off and hit the right side of her head.  She also reports that she has been having some minor pain in her ankle.  She denies loss of consciousness with this fall she does take blood thinners no neck pain chest pain abdominal pain no other acute symptom change or concern.    PAST MEDICAL HISTORY   has a past medical history of A-fib ([facility]), Anesthesia, Anticoagulant long-term use (1/12/2012), Arthritis, Atrial fibrillation ([facility]), Backpain, Bowel habit changes, Breath shortness, Bronchitis (Nov, 2013), CAD (coronary artery disease), Depression, Glaucoma (5/3/2011), Hematoma complicating a procedure (11/3/2012), Hemorrhagic disorder ([facility]), Hypertension, Hypothyroid, Lupus ([facility]), Macular degeneration, Menopause (1/12/2012), Mitral regurgitation (10/30/2012), Obesity (1/12/2012), Pacemaker (2018), Pneumonia (feb,2013), Pre-syncope (6/29/2018), Pulmonary hypertension ([facility]) (10/30/2012), PVC's (premature ventricular contractions) (1/12/2012), Senile nuclear sclerosis, Spinal stenosis of lumbar region at multiple levels, Unspecified cataract, Unspecified urinary incontinence, and Urinary bladder disorder.    SURGICAL HISTORY   has a past surgical history that includes tonsillectomy and adenoidectomy; recovery (11/30/2010); colonoscopy " "(2008); abdominal hysterectomy total (April 15,1975); other; cataract phaco with iol (4/21/2015); cataract phaco with iol (Right, 5/5/2015); pacemaker insertion (06/30/2018); open reduction; other cardiac surgery (12/2017 and 07/19/2018 ); hip arth anterior total (Right, 1/17/2019); irrigation & debridement ortho (Right, 2/14/2019); combined ant/post colporrhaphy (1/14/2020); lap,diagnostic abdomen (1/14/2020); enterocele repair (1/14/2020); bladder sling female (1/14/2020); vaginal suspension (1/14/2020); salpingo oophorectomy (Bilateral, 1/14/2020); knee arthroplasty total (Left, 5/28/2015); and knee arthroplasty total (Right, 6/23/2016).    FAMILY HISTORY  Family History   Problem Relation Age of Onset    Stroke Mother     Diabetes Father     Stroke Sister     Heart Disease Brother     Stroke Sister     GI Disease Daughter         Crohn's Disease    Heart Disease Daughter         CHF    Other Daughter         Chronic Pain--Lymphedema    Cancer Paternal Aunt         breast       SOCIAL HISTORY  Social History     Tobacco Use    Smoking status: Never    Smokeless tobacco: Never   Vaping Use    Vaping status: Never Used   Substance and Sexual Activity    Alcohol use: No    Drug use: No    Sexual activity: Not Currently     Partners: Male     Birth control/protection: Post-Menopausal       CURRENT MEDICATIONS  Home Medications    **Home medications have not yet been reviewed for this encounter**         ALLERGIES  Allergies   Allergen Reactions    Amiodarone Hives     Throat and tongue itching    Bactrim Shortness of Breath    Cipro Xr Swelling    Metoprolol Swelling     Causes throat swelling    Morphine Unspecified     Hallucinations    Phytoplex Z-Guard [Petrolatum-Zinc Oxide] Unspecified     \"causes burning\"    Pseudoephedrine Palpitations    Qvar [Beclomethasone Dipropionate] Unspecified     Pressure on heart      Vibramycin Shortness of Breath    Atorvastatin Calcium-Polysorbate 80 Unspecified     Muscle " "aches      Augmentin Unspecified     Unknown reaction    Diltiazem Rash     rash    Flecainide Unspecified     dizziness    Keflex Unspecified     Pt states \"Unsure\".    Mucinex Unspecified     GI Distress      Tramadol Unspecified     crying    Atorvastatin Myalgia    Tape Rash     Paper tape okay       PHYSICAL EXAM  VITAL SIGNS: /54   Pulse 80   Temp 37.1 °C (98.8 °F) (Temporal)   Ht 1.575 m (5' 2\")   Wt 77.1 kg (170 lb)   LMP 01/10/1975   SpO2 96%   BMI 31.09 kg/m²      Pulse ox interpretation: I interpret this pulse ox as normal.  Constitutional: Pleasantly confused no apparent distress  HEENT: Minimal abrasion right foreheadnormocephalic, pupils are equal round reactive to light extraocular movements are intact. The nares is clear, external ears are normal, mouth shows moist mucous membranes normal dentition for age  Neck: Supple, no JVD no tracheal deviation  Cardiovascular: Regular rate and rhythm no murmur rub or gallop 2+ pulses peripherally x4  Thorax & Lungs: No respiratory distress, no wheezes rales or rhonchi, No chest tenderness.   GI: Soft nontender nondistended positive bowel sounds, no peritoneal signs  Skin: Warm dry no acute rash or lesion  Musculoskeletal: Moving all extremities with full range and 5 of 5 strength no acute  deformity minimal abrasion of the right lateral malleolus  Neurologic: Cranial nerves III through XII are grossly intact no sensory deficit no cerebellar dysfunction   Psychiatric: Appropriate affect for situation at this time        RADIOLOGY/PROCEDURES   I have independently interpreted the diagnostic imaging associated with this visit and am waiting the final reading from the radiologist.   My preliminary interpretation is as follows: No acute fracture of the ankle no intracranial hemorrhage    Radiologist interpretation:  CT-HEAD W/O   Final Result      1.  Diffuse atrophy and white matter changes.   2.  No acute intracranial hemorrhage or territorial " "infarct.                  DX-ANKLE 3+ VIEWS RIGHT   Final Result      No radiographic evidence of acute traumatic injury.          COURSE & MEDICAL DECISION MAKING    ASSESSMENT, COURSE AND PLAN  Care Narrative: Ankle x-rays negative for acute fracture head CT negative for acute intracranial hemorrhage or skull fracture.  Throughout her time here she has had normal sinus rhythm normal blood pressure no sign of infectious process no sign of any bacterial infection or toxicity.  No indication for further labs or imaging at this time.  At this time patient is stable she appears to be at her baseline mental status without any other acute decompensation.  Patient will be transferred back to her care facility.  Return for any further symptom changes or concerns.            ADDITIONAL PROBLEMS MANAGED      DISPOSITION AND DISCUSSIONS  I have discussed management of the patient with the following physicians and DANNY's:      Discussion of management with other QHP or appropriate source(s): Case Management        Escalation of care considered, and ultimately not performed:acute inpatient care management, however at this time, the patient is most appropriate for outpatient management    Barriers to care at this time, including but not limited to: .     Decision tools and prescription drugs considered including, but not limited to: .  /52   Pulse 84   Temp 37.1 °C (98.8 °F) (Temporal)   Resp 18   Ht 1.575 m (5' 2\")   Wt 77.1 kg (170 lb)   LMP 01/10/1975   SpO2 100%   BMI 31.09 kg/m²     Jolie Escobar M.D.  [address]  [facility] [id number] [phone]          [facility], Emergency Dept  [address] [facility] [address] [phone]    in 12-24 hours if symptoms persist, immediately If symptoms worsen, or if you develop any other symptoms or concerns      FINAL DIAGNOSIS  1. Fall, initial encounter Active   2. Closed head injury, initial encounter Active   3. " Contusion of right ankle, initial encounter Active          Electronically signed by: Randy Stevens M.D.

## 2024-06-27 NOTE — ED NOTES
Pt resting on gurney connected to continuous monitor. Pt states they have no other identifiable needs at this time

## 2024-06-28 ENCOUNTER — HOME CARE VISIT (OUTPATIENT)
Dept: HOME HEALTH SERVICES | Facility: HOME HEALTHCARE | Age: 84
End: 2024-06-28
Payer: MEDICARE

## 2024-06-28 ENCOUNTER — TELEPHONE (OUTPATIENT)
Dept: MEDICAL GROUP | Facility: MEDICAL CENTER | Age: 84
End: 2024-06-28
Payer: MEDICARE

## 2024-06-28 PROCEDURE — G0493 RN CARE EA 15 MIN HH/HOSPICE: HCPCS

## 2024-06-28 NOTE — TELEPHONE ENCOUNTER
I received a physician communication from [facility] requesting a referral to compassionate care hospice.  This is the first time I am hearing about hospice care.  I reached out to the patient, but the number in her chart does go to her daughter.  Her daughter is currently hospitalized and was unavailable to talk.  I see that there is an appointment today with the home care nurse.  If possible would you be able to clarify whether she wants hospice care and if she does I will put in a referral.  Can you also reiterate to the patient that I really do need to see her in office or at a virtual appointment? We really need to check in regarding her depression and change in condition.  As always, I appreciate your help.    Thank You,  Dr. Escobar

## 2024-06-29 VITALS
TEMPERATURE: 98.1 F | SYSTOLIC BLOOD PRESSURE: 115 MMHG | HEART RATE: 89 BPM | DIASTOLIC BLOOD PRESSURE: 65 MMHG | RESPIRATION RATE: 16 BRPM | OXYGEN SATURATION: 94 %

## 2024-06-29 SDOH — ECONOMIC STABILITY: HOUSING INSECURITY: EVIDENCE OF SMOKING MATERIAL: 0

## 2024-06-29 ASSESSMENT — ENCOUNTER SYMPTOMS
LIMITED RANGE OF MOTION: 1
FORGETFULNESS: 1
VOMITING: DENIES
FATIGUE: 1
NAUSEA: DENIES
PAIN: 1
PAIN SEVERITY GOAL: 0/10
MUSCLE WEAKNESS: 1
DESCRIPTION OF MEMORY LOSS: SHORT TERM
SUBJECTIVE PAIN PROGRESSION: UNCHANGED
DESCRIPTION OF MEMORY LOSS: LONG TERM
DEPRESSED MOOD: 1
LOWEST PAIN SEVERITY IN PAST 24 HOURS: 10/10
HIGHEST PAIN SEVERITY IN PAST 24 HOURS: 10/10

## 2024-06-29 ASSESSMENT — PATIENT HEALTH QUESTIONNAIRE - PHQ9: CLINICAL INTERPRETATION OF PHQ2 SCORE: 0

## 2024-06-29 ASSESSMENT — ACTIVITIES OF DAILY LIVING (ADL)
CURRENT_FUNCTION: ONE PERSON
AMBULATION ASSISTANCE: ONE PERSON

## 2024-07-01 ENCOUNTER — HOME CARE VISIT (OUTPATIENT)
Dept: HOME HEALTH SERVICES | Facility: HOME HEALTHCARE | Age: 84
End: 2024-07-01
Payer: MEDICARE

## 2024-07-01 PROCEDURE — G0493 RN CARE EA 15 MIN HH/HOSPICE: HCPCS

## 2024-07-02 VITALS
HEART RATE: 74 BPM | DIASTOLIC BLOOD PRESSURE: 65 MMHG | RESPIRATION RATE: 16 BRPM | OXYGEN SATURATION: 94 % | TEMPERATURE: 97.6 F | SYSTOLIC BLOOD PRESSURE: 110 MMHG

## 2024-07-02 DIAGNOSIS — Z71.89 ADVANCED CARE PLANNING/COUNSELING DISCUSSION: ICD-10-CM

## 2024-07-02 ASSESSMENT — ENCOUNTER SYMPTOMS
HIGHEST PAIN SEVERITY IN PAST 24 HOURS: 0/10
LOWEST PAIN SEVERITY IN PAST 24 HOURS: 0/10
DENIES PAIN: 1
MUSCLE WEAKNESS: 1
STOOL FREQUENCY: LESS THAN DAILY
LAST BOWEL MOVEMENT: 67022
FORGETFULNESS: 1
SUBJECTIVE PAIN PROGRESSION: UNCHANGED
BOWEL PATTERN NORMAL: 1
PAIN SEVERITY GOAL: 0/10
NAUSEA: DENIES
DESCRIPTION OF MEMORY LOSS: LONG TERM
PERSON REPORTING PAIN: PATIENT
DESCRIPTION OF MEMORY LOSS: SHORT TERM
FATIGUE: 1
DEPRESSED MOOD: 1
VOMITING: DENIES

## 2024-07-02 ASSESSMENT — PATIENT HEALTH QUESTIONNAIRE - PHQ9
CLINICAL INTERPRETATION OF PHQ2 SCORE: 2
SUM OF ALL RESPONSES TO PHQ QUESTIONS 1-9: 6
5. POOR APPETITE OR OVEREATING: 1 - SEVERAL DAYS

## 2024-07-03 ENCOUNTER — HOME CARE VISIT (OUTPATIENT)
Dept: HOME HEALTH SERVICES | Facility: HOME HEALTHCARE | Age: 84
End: 2024-07-03
Payer: MEDICARE

## 2024-07-04 ENCOUNTER — HOME CARE VISIT (OUTPATIENT)
Dept: HOME HEALTH SERVICES | Facility: HOME HEALTHCARE | Age: 84
End: 2024-07-04
Payer: MEDICARE

## 2024-07-04 VITALS
DIASTOLIC BLOOD PRESSURE: 70 MMHG | RESPIRATION RATE: 16 BRPM | HEART RATE: 68 BPM | SYSTOLIC BLOOD PRESSURE: 110 MMHG | OXYGEN SATURATION: 96 % | TEMPERATURE: 98.2 F

## 2024-07-04 PROCEDURE — G0493 RN CARE EA 15 MIN HH/HOSPICE: HCPCS

## 2024-07-04 ASSESSMENT — PATIENT HEALTH QUESTIONNAIRE - PHQ9
SUM OF ALL RESPONSES TO PHQ QUESTIONS 1-9: 8
5. POOR APPETITE OR OVEREATING: 1 - SEVERAL DAYS
CLINICAL INTERPRETATION OF PHQ2 SCORE: 1

## 2024-07-04 ASSESSMENT — ACTIVITIES OF DAILY LIVING (ADL)
AMBULATION ASSISTANCE: ONE PERSON
CURRENT_FUNCTION: ONE PERSON

## 2024-07-04 ASSESSMENT — ENCOUNTER SYMPTOMS
BOWEL PATTERN NORMAL: 1
HIGHEST PAIN SEVERITY IN PAST 24 HOURS: 5/10
LOWEST PAIN SEVERITY IN PAST 24 HOURS: 5/10
NAUSEA: DENIES
VOMITING: DENIES
SUBJECTIVE PAIN PROGRESSION: UNCHANGED
PAIN SEVERITY GOAL: 0/10
PERSON REPORTING PAIN: PATIENT
PAIN: 1
LAST BOWEL MOVEMENT: 67025
STOOL FREQUENCY: DAILY

## 2024-07-05 ENCOUNTER — HOME CARE VISIT (OUTPATIENT)
Dept: HOME HEALTH SERVICES | Facility: HOME HEALTHCARE | Age: 84
End: 2024-07-05
Payer: MEDICARE

## 2024-07-05 PROCEDURE — G0155 HHCP-SVS OF CSW,EA 15 MIN: HCPCS

## 2024-07-05 ASSESSMENT — ACTIVITIES OF DAILY LIVING (ADL)
PHYSICAL_TRANSFER_REQUIRES_ASSISTANCE: 1
TOILETING_REQUIRES_ASSISTANCE: 1
LAUNDRY_REQUIRES_ASSISTANCE: 1
EATING_REQUIRES_ASSISTANCE: 1
BATHING_REQUIRES_ASSISTANCE: 1
SHOPPING_REQUIRES_ASSISTANCE: 1
DRESSING_REQUIRES_ASSISTANCE: 1
AMBULATION_REQUIRES_ASSISTANCE: 1
GROOMING_REQUIRES_ASSISTANCE: 1

## 2024-07-05 ASSESSMENT — ENCOUNTER SYMPTOMS: DIFFICULTY THINKING: 1

## 2024-07-08 ENCOUNTER — HOME CARE VISIT (OUTPATIENT)
Dept: HOME HEALTH SERVICES | Facility: HOME HEALTHCARE | Age: 84
End: 2024-07-08
Payer: MEDICARE

## 2024-07-09 ENCOUNTER — HOME CARE VISIT (OUTPATIENT)
Dept: HOME HEALTH SERVICES | Facility: HOME HEALTHCARE | Age: 84
End: 2024-07-09
Payer: MEDICARE

## 2024-07-09 VITALS
OXYGEN SATURATION: 98 % | TEMPERATURE: 97.4 F | RESPIRATION RATE: 16 BRPM | SYSTOLIC BLOOD PRESSURE: 118 MMHG | HEART RATE: 76 BPM | DIASTOLIC BLOOD PRESSURE: 68 MMHG

## 2024-07-09 VITALS
RESPIRATION RATE: 16 BRPM | SYSTOLIC BLOOD PRESSURE: 118 MMHG | OXYGEN SATURATION: 98 % | DIASTOLIC BLOOD PRESSURE: 68 MMHG | TEMPERATURE: 98.2 F | HEART RATE: 76 BPM

## 2024-07-09 PROCEDURE — G0299 HHS/HOSPICE OF RN EA 15 MIN: HCPCS

## 2024-07-09 PROCEDURE — G0493 RN CARE EA 15 MIN HH/HOSPICE: HCPCS

## 2024-07-09 SDOH — ECONOMIC STABILITY: HOUSING INSECURITY: EVIDENCE OF SMOKING MATERIAL: 0

## 2024-07-09 ASSESSMENT — ENCOUNTER SYMPTOMS
PERSON REPORTING PAIN: PATIENT
VOMITING: DENIES
PAIN SEVERITY GOAL: 0/10
LOWEST PAIN SEVERITY IN PAST 24 HOURS: 6/10
AGITATION: 1
LIMITED RANGE OF MOTION: 1
DESCRIPTION OF MEMORY LOSS: SHORT TERM
MUSCLE WEAKNESS: 1
MUSCLE WEAKNESS: 1
DEPRESSED MOOD: 1
FATIGUES EASILY: 1
BOWEL PATTERN COMMENTS: UNABLE TO ANSWER
PAIN LOCATION - RELIEVING FACTORS: PAIN MED, REPOSITIONING
PAIN LOCATION - PAIN SEVERITY: 5/10
PAIN SEVERITY GOAL: 0/10
STOOL FREQUENCY: LESS THAN DAILY
NAUSEA: DENIES
HIGHEST PAIN SEVERITY IN PAST 24 HOURS: 6/10
DESCRIPTION OF MEMORY LOSS: LONG TERM
POOR JUDGMENT: 1
PAIN LOCATION - PAIN DURATION: DAILY
STOOL FREQUENCY: LESS THAN DAILY
POOR JUDGMENT: 1
SHORTNESS OF BREATH: 1
LAST BOWEL MOVEMENT: 67028
BOWEL PATTERN NORMAL: 1
PAIN LOCATION - PAIN FREQUENCY: FREQUENT
SUBJECTIVE PAIN PROGRESSION: UNCHANGED
FATIGUE: 1
FATIGUE: 1
DYSPNEA ON EXERTION: 1
PAIN LOCATION - PAIN QUALITY: ACHY SHARP
PERSON REPORTING PAIN: PATIENT
SUBJECTIVE PAIN PROGRESSION: UNCHANGED
DESCRIPTION OF MEMORY LOSS: LONG TERM
PAIN: 1
DEPRESSION: 1
PAIN: 1
ARTHRALGIAS: 1
PAIN LOCATION: LEFT HIP
BOWEL PATTERN NORMAL: 1
LOWEST PAIN SEVERITY IN PAST 24 HOURS: 5/10
DESCRIPTION OF MEMORY LOSS: SHORT TERM
FORGETFULNESS: 1
PAIN LOCATION - EXACERBATING FACTORS: MOVEMENT
HIGHEST PAIN SEVERITY IN PAST 24 HOURS: 6/10
DRY SKIN: 1

## 2024-07-09 ASSESSMENT — PATIENT HEALTH QUESTIONNAIRE - PHQ9
SUM OF ALL RESPONSES TO PHQ QUESTIONS 1-9: 26
5. POOR APPETITE OR OVEREATING: 3 - NEARLY EVERY DAY
CLINICAL INTERPRETATION OF PHQ2 SCORE: 6

## 2024-07-09 ASSESSMENT — PAIN SCALES - PAIN ASSESSMENT IN ADVANCED DEMENTIA (PAINAD)
FACIALEXPRESSION: 1 - SAD. FRIGHTENED. FROWN.
CONSOLABILITY: 1 - DISTRACTED OR REASSURED BY VOICE OR TOUCH.
NEGVOCALIZATION: 0 - NONE.
TOTALSCORE: 3
BODYLANGUAGE: 1 - TENSE. DISTRESSED PACING. FIDGETING.

## 2024-07-09 ASSESSMENT — ACTIVITIES OF DAILY LIVING (ADL)
CURRENT_FUNCTION: ONE PERSON
AMBULATION ASSISTANCE: STAND BY ASSIST
CURRENT_FUNCTION: STAND BY ASSIST
AMBULATION ASSISTANCE: STAND BY ASSIST
AMBULATION ASSISTANCE: ONE PERSON

## 2024-07-10 ENCOUNTER — HOME CARE VISIT (OUTPATIENT)
Dept: HOME HEALTH SERVICES | Facility: HOME HEALTHCARE | Age: 84
End: 2024-07-10
Payer: MEDICARE

## 2024-07-11 ENCOUNTER — HOME CARE VISIT (OUTPATIENT)
Dept: HOME HEALTH SERVICES | Facility: HOME HEALTHCARE | Age: 84
End: 2024-07-11
Payer: MEDICARE

## 2024-07-12 ENCOUNTER — HOME CARE VISIT (OUTPATIENT)
Dept: HOME HEALTH SERVICES | Facility: HOME HEALTHCARE | Age: 84
End: 2024-07-12
Payer: MEDICARE

## 2024-07-12 SDOH — ECONOMIC STABILITY: HOUSING INSECURITY: EVIDENCE OF SMOKING MATERIAL: 0

## 2024-07-12 SDOH — ECONOMIC STABILITY: FOOD INSECURITY: MEALS PER DAY: 2

## 2024-07-12 ASSESSMENT — ENCOUNTER SYMPTOMS
AGITATION: 1
LAST BOWEL MOVEMENT: 67029
POOR JUDGMENT: 1
BOWEL PATTERN NORMAL: 1
APPETITE LEVEL: POOR
FATIGUES EASILY: 1
DESCRIPTION OF MEMORY LOSS: LONG TERM
STOOL FREQUENCY: LESS THAN DAILY
DYSPNEA ON EXERTION: 1
DRY SKIN: 1
FORGETFULNESS: 1
DESCRIPTION OF MEMORY LOSS: SHORT TERM
FATIGUE: 1
CHANGE IN APPETITE: DECREASED
DEPRESSED MOOD: 1
SHORTNESS OF BREATH: 1

## 2024-07-12 ASSESSMENT — ACTIVITIES OF DAILY LIVING (ADL)
HOME_HEALTH_OASIS: 01
OASIS_M1830: 06

## 2024-07-12 ASSESSMENT — PATIENT HEALTH QUESTIONNAIRE - PHQ9: PATIENT UNABLE TO COMPLETE PHQ: OFFICE DC

## 2024-07-15 ENCOUNTER — APPOINTMENT (OUTPATIENT)
Dept: MEDICAL GROUP | Facility: MEDICAL CENTER | Age: 84
End: 2024-07-15
Payer: MEDICARE

## 2024-07-16 ENCOUNTER — APPOINTMENT (OUTPATIENT)
Dept: MEDICAL GROUP | Facility: MEDICAL CENTER | Age: 84
End: 2024-07-16
Payer: MEDICARE

## 2024-07-16 RX ORDER — FLUCONAZOLE 150 MG/1
150 TABLET ORAL DAILY
Qty: 1 TABLET | Refills: 0 | Status: SHIPPED | OUTPATIENT
Start: 2024-07-16

## 2024-08-08 ENCOUNTER — TELEPHONE (OUTPATIENT)
Dept: CARDIOLOGY | Facility: MEDICAL CENTER | Age: 84
End: 2024-08-08
Payer: MEDICARE

## 2024-08-08 NOTE — TELEPHONE ENCOUNTER
Received fax from [facility] for refill request on Xarelto.    Patient was last seen on 01/20/23 by SC.  RX was discontinued on 04/22/24 by Dr. Lisa Mercedes on discharge and changed to 15mg but no active RX, unknown who is currently prescribing.     Phone Number Called: [phone]    Call outcome: mailbox full    Message: Called to advise needs a follow up appointment for further refills.   -------------------------------------------------------------------  Phone Number Called: [phone] Son in law Aly    Call outcome: Spoke to son-in-law Aly regarding message below.    Message: Called to advise Donte needs a follow up appointment, he stated she passed away on 08/05/24. Advised will update chart.     SC: TREVOR. Thanks!

## 2024-11-10 NOTE — CARE PLAN
Problem: Safety  Goal: Will remain free from injury    Intervention: Provide assistance with mobility  Bed in lowest position and locked, upper side rails up x2, treaded socks on, and bed alarm on. Belongings and call light in reach. Safety ongoing.       Problem: Knowledge Deficit  Goal: Knowledge of disease process/condition, treatment plan, diagnostic tests, and medications will improve    Intervention: Assess knowledge level of disease process/condition, treatment plan, diagnostic tests, and medications  Plan of care and medication administration discussed with pt. Questions and concerns addressed.      Problem: Mobility  Goal: Risk for activity intolerance will decrease  Outcome: PROGRESSING AS EXPECTED  Ambulatory with standby assist using FWW.        Ambulatory

## 2024-12-24 NOTE — CERTIFICATION
"Non Provider Encounter- Full Thickness wound    HISTORY OF PRESENT ILLNESS        START OF CARE IN CLINIC: 10/01/2019    REFERRING PROVIDER: Kishore Price MD     WOUND- Full thickness wound   LOCATION: Right buttock (kissing buttocks wound)   WOUND HISTORY: Patient is an 78 year old female who has had the wound to her right buttock since December 2018. She is unsure of how the wound started, but does state that she has a bad back and sits for several hours a day. There appears to be a healed wound to her left buttock also. She is not sure when this may have closed. She states that since December, her PCP has been trying different techniques to heal the wound with no success, so he referred her to [facility]. She has been applying lidocaine to the wound daily as well as Gold Bond cream. She does report that she is scheduled for hip surgery in Jan 2020.    PERTINENT PMH: Afib (on Coumadin); spinal stenosis, back pain      IMAGING: None pertinent to wound   VASCULAR STUDIES: N/A     LAST  WOUND CULTURE:  DATE : N/A                DIABETES: No  TOBACCO USE: Denies    RESULTS: None pertinent    FALL RISK ASSESSMENT:   x65 years or older     xFall within the last 2 years   xUses ambulatory devices  Loss of protective sensation in feet   Use of prostethic/orthotic    Presence of lower extremity/foot/toe amputation   xTaking medication that increases risk (per facility policy)    Interventions Recommended (if any of the above are selected):   Use of Assistive Device: Uses walker; cane; crutches. Was steady on her feet while ambulating today.    Supervision with ambulation: Caregiver   Assistance with ambulation: Caregiver   Home safety education: Educational material provided    PAST MEDICAL HISTORY:   Past Medical History:   Diagnosis Date   • A-fib ([facility])    • Anesthesia     \"Tachycardia for 5 days after cataract surgery\"   • Anticoagulant long-term use 1/12/2012   • Arthritis     Knees, hips   • Asthma     with pneumonia, " nothing for 3 years   • Atrial fibrillation (HCC)    • Backpain     R hip   • Bowel habit changes     diarrhea   • Breath shortness     with exertion O2  2l/m PRN, hasnt used for 3 years   • Bronchitis Nov, 2013   • CAD (coronary artery disease)    • Depression    • Glaucoma 5/3/2011   • Hematoma complicating a procedure 11/3/2012   • Hemorrhagic disorder (HCC)     bruising/coumadin   • High cholesterol    • Hypertension    • Hypothyroid    • Lupus (HCC)    • Macular degeneration    • Menopause 1/12/2012   • Mitral regurgitation 10/30/2012   • Obesity 1/12/2012   • Pacemaker 2018   • Pneumonia feb,2013   • Pre-syncope 6/29/2018   • Pulmonary hypertension (HCC) 10/30/2012   • PVC's (premature ventricular contractions) 1/12/2012   • Senile nuclear sclerosis    • Spinal stenosis of lumbar region at multiple levels    • Unspecified cataract     repaired bilateral   • Unspecified urinary incontinence      PAST SURGICAL HISTORY:   Past Surgical History:   Procedure Laterality Date   • IRRIGATION & DEBRIDEMENT ORTHO Right 2/14/2019    Procedure: IRRIGATION & DEBRIDEMENT ORTHO-HIP WOUND ;  Surgeon: Vladimir Lee M.D.;  Location: [facility];  Service: Orthopedics   • HIP ARTH ANTERIOR TOTAL Right 1/17/2019    Procedure: HIP ARTHROPLASTY ANTERIOR TOTAL;  Surgeon: Juan C Mercedes M.D.;  Location: [facility];  Service: Orthopedics   • PACEMAKER INSERTION  06/30/2018    Dual Chamber   • KNEE ARTHROPLASTY TOTAL Right 6/23/2016    Procedure: KNEE ARTHROPLASTY TOTAL;  Surgeon: Heriberto Lozada M.D.;  Location: [facility];  Service:    • KNEE ARTHROPLASTY TOTAL Left 5/28/2015    Procedure: KNEE ARTHROPLASTY TOTAL;  Surgeon: Heriberto Lozada M.D.;  Location: [facility];  Service:    • CATARACT PHACO WITH IOL Right 5/5/2015    Procedure: IOL OD - STANDARD;  Surgeon: Dmitry Bejarano M.D.;  Location: [facility];  Service:    • CATARACT PHACO WITH IOL  4/21/2015     "Performed by Dmitry Bejarano M.D. at [facility]   • RECOVERY  11/30/2010    Performed by SURGERY, CATH-RECOVERY at [facility]   • COLONOSCOPY  2008    Normal    [facility]   • ABDOMINAL HYSTERECTOMY TOTAL  April 15,1975    still has ovaries   • OPEN REDUCTION      left ankle   • OTHER      Removed pins from left ankle   • OTHER CARDIAC SURGERY  12/2017 and 07/19/2018     Cardiac Ablation   • TONSILLECTOMY AND ADENOIDECTOMY        MEDICATIONS:   Current Outpatient Medications   Medication   • estradiol (ESTRACE) 0.1 MG/GM vaginal cream   • magnesium oxide (MAG-OX) 400 MG Tab   • ipratropium (ATROVENT) 0.03 % Solution   • bacitracin-polymyxin b (POLYSPORIN) 500-43814 UNIT/GM Ointment   • levothyroxine (SYNTHROID) 50 MCG Tab   • ciclopirox (PENLAC) 8 % solution   • lisinopril (PRINIVIL) 5 MG Tab   • raNITidine (ZANTAC) 150 MG Tab   • warfarin (COUMADIN) 2.5 MG Tab   • atenolol (TENORMIN) 50 MG Tab   • nystatin (MYCOSTATIN) powder   • Misc. Devices Misc   • sennosides (SENOKOT) 8.6 MG Tab   • Alpha-D-Galactosidase (BEANO) Tab   • Acetaminophen 500 MG Cap   • oxyCODONE immediate release (ROXICODONE) 10 MG immediate release tablet   • estradiol (ESTRACE) 2 MG Tab   • spironolactone (ALDACTONE) 50 MG Tab   • magnesium oxide (MAG-OX) 400 (241.3 Mg) MG Tab tablet   • PREBIOTIC PRODUCT PO   • sertraline (ZOLOFT) 50 MG Tab   • Lutein 20 MG Cap   • vitamin D (CHOLECALCIFEROL) 1000 UNIT TABS     No current facility-administered medications for this visit.      ALLERGIES:    Allergies   Allergen Reactions   • Amiodarone Hives     Throat and tongue itching   • Bactrim Shortness of Breath   • Cipro Xr Swelling   • Metoprolol Swelling     Causes throat swelling   • Morphine Unspecified     Hallucinations   • Phytoplex Z-Guard [Petrolatum-Zinc Oxide] Unspecified     \"causes burning\"   • Pseudoephedrine Palpitations   • Qvar [Beclomethasone Dipropionate] Unspecified     Pressure on heart     • " "Vibramycin Shortness of Breath   • Atorvastatin Calcium-Polysorbate 80 Unspecified     Muscle aches     • Augmentin Unspecified     Unknown reaction   • Diltiazem Rash     rash   • Flecainide Unspecified     dizziness   • Keflex Unspecified     Pt states \"Unsure\".   • Mucinex Unspecified     GI Distress     • Tramadol Unspecified     crying   • Atorvastatin Myalgia   • Tape Rash     Paper tape okay     SOCIAL HISTORY:   Social History     Socioeconomic History   • Marital status:      Spouse name: Not on file   • Number of children: Not on file   • Years of education: Not on file   • Highest education level: Not on file   Occupational History   • Not on file   Social Needs   • Financial resource strain: Not on file   • Food insecurity:     Worry: Not on file     Inability: Not on file   • Transportation needs:     Medical: Not on file     Non-medical: Not on file   Tobacco Use   • Smoking status: Never Smoker   • Smokeless tobacco: Never Used   Substance and Sexual Activity   • Alcohol use: No   • Drug use: No   • Sexual activity: Not Currently     Partners: Male     Birth control/protection: Post-Menopausal   Lifestyle   • Physical activity:     Days per week: Not on file     Minutes per session: Not on file   • Stress: Not on file   Relationships   • Social connections:     Talks on phone: Not on file     Gets together: Not on file     Attends [employer] service: Not on file     Active member of club or organization: Not on file     Attends meetings of clubs or organizations: Not on file     Relationship status: Not on file   • Intimate partner violence:     Fear of current or ex partner: Not on file     Emotionally abused: Not on file     Physically abused: Not on file     Forced sexual activity: Not on file   Other Topics Concern   • Not on file   Social History Narrative   • Not on file     Wound Assessment:  Wound 10/01/19 Buttocks Right buttock (Active)   Wound Image    10/1/2019 10:30 AM   Site " Assessment Red;Yellow 10/1/2019 10:30 AM   Edie-wound Assessment Intact 10/1/2019 10:30 AM   Margins Attached edges 10/1/2019 10:30 AM   Wound Length (cm) 0.3 cm 10/1/2019 10:30 AM   Wound Width (cm) 0.5 cm 10/1/2019 10:30 AM   Wound Surface Area (cm^2) 0.15 cm^2 10/1/2019 10:30 AM   Post Wound Length (cm) 0.5 cm 10/1/2019 10:30 AM    Post Wound Width (cm) 0.6 cm 10/1/2019 10:30 AM   Post Wound Depth (cm) 0.1 cm 10/1/2019 10:30 AM   Post Wound Surface Area (cm^2) 0.3 cm^2 10/1/2019 10:30 AM   Tunneling 0 cm 10/1/2019 10:30 AM   Undermining 0 cm 10/1/2019 10:30 AM   Closure Secondary intention 10/1/2019 10:30 AM   Drainage Amount Small 10/1/2019 10:30 AM   Drainage Description Serosanguineous 10/1/2019 10:30 AM   Non-staged Wound Description Full thickness 10/1/2019 10:30 AM   Treatments Cleansed;Pharmaceutical agent 10/1/2019 10:30 AM   Cleansing Normal Saline Irrigation 10/1/2019 10:30 AM   Periwound Protectant Skin Protectant wipes to Periwound 10/1/2019 10:30 AM   Dressing Options Honey Colloid;Hypafix Tape 10/1/2019 10:30 AM   Dressing Changed New 10/1/2019 10:30 AM   Dressing Status Clean;Dry;Intact 10/1/2019 10:30 AM   Dressing Change Frequency Every 72 hrs 10/1/2019 10:30 AM   WOUND NURSE ONLY - Odor None 10/1/2019 10:30 AM   WOUND NURSE ONLY - Exposed Structures None 10/1/2019 10:30 AM   WOUND NURSE ONLY - Tissue Type and Percentage 100% pink 10/1/2019 10:30 AM     Pre-debridement Photo      Procedures:     Debridement: 2% viscous lidocaine for ~5-10 minute dwell time. CSWD with scalpel and forceps to remove <1 cm2 non viable tissue from wound bed.      Cleansed with:  NS                                                                        Periwound protected with: Skin prep   Primary dressing: See flow sheets for dressing selection    Post-debridement Photo      PATIENT EDUCATION  -Advised to go to ER for any increased redness, swelling, drainage or odor, or if patient develops fever, chills, nausea or  vomiting.  -Importance of adequate nutrition for wound healing  -Increase protein intake (unless contraindicated by renal status)   No

## 2025-06-24 NOTE — CASE COMMUNICATION
noted  ----- Message -----  From: Silke Roth OT  Sent: 5/2/2024   2:18 PM PDT  To: Chela Mohamud R.N.; Sneha Medina R.N.; *      OT completed initial evaluation 5/2/24.  OT will see client 1W3 for HEP, safety, and kitchen mobility.  She refused need to work on safety with showering.   N/A

## (undated) DEVICE — CANISTER SUCTION 3000ML MECHANICAL FILTER AUTO SHUTOFF MEDI-VAC NONSTERILE LF DISP  (40EA/CA)

## (undated) DEVICE — HEAD HOLDER JUNIOR/ADULT

## (undated) DEVICE — PACK LOWER EXTREMITY - (2/CA)

## (undated) DEVICE — PACK TOTAL HIP - (1/CA)

## (undated) DEVICE — HANDPIECE 10FT INTPLS SCT PLS IRRIGATION HAND CONTROL SET (6/PK)

## (undated) DEVICE — MASK ANESTHESIA ADULT  - (100/CA)

## (undated) DEVICE — NEEDLE INSUFFLATION FOR STEP - (12/BX)

## (undated) DEVICE — GOWN WARMING STANDARD FLEX - (30/CA)

## (undated) DEVICE — BLADE SURGICAL #11 - (50/BX)

## (undated) DEVICE — GLOVE BIOGEL SZ 8 SURGICAL PF LTX - (50PR/BX 4BX/CA)

## (undated) DEVICE — SET EXTENSION WITH 2 PORTS (48EA/CA) ***PART #2C8610 IS A SUBSTITUTE*****

## (undated) DEVICE — SUCTION INSTRUMENT YANKAUER BULBOUS TIP W/O VENT (50EA/CA)

## (undated) DEVICE — DRAPE C-ARM LARGE 41IN X 74 IN - (10/BX 2BX/CA)

## (undated) DEVICE — SUTURE 0 VICRYL PLUS UR-6 - 27 INCH (36/BX)

## (undated) DEVICE — TROCAR STEP 5MM - (3/CA)

## (undated) DEVICE — SENSOR SPO2 NEO LNCS ADHESIVE (20/BX) SEE USER NOTES

## (undated) DEVICE — LACTATED RINGERS INJ 1000 ML - (14EA/CA 60CA/PF)

## (undated) DEVICE — GLOVESZ 8.5 BIOGEL PI MICRO - PF LF (50PR/BX)

## (undated) DEVICE — SUTURE 3-0 MONOCRYL PLUS PS-1 - 27 INCH (36/BX)

## (undated) DEVICE — TUBE E-T HI-LO CUFF 6.5MM (10EA/BX)

## (undated) DEVICE — SUTURE 2-0 VICRYL PLUS CT-1 - 8 X 18 INCH(12/BX)

## (undated) DEVICE — PROTECTOR ULNA NERVE - (36PR/CA)

## (undated) DEVICE — SYSTEM PEEL & PLACE 13CM INCISIONS

## (undated) DEVICE — WATER IRRIG. STER. 1500 ML - (9/CA)

## (undated) DEVICE — DRESSING TRANSPARENT FILM TEGADERM 4 X 4.75" (50EA/BX)"

## (undated) DEVICE — SUTURE 4-0 VICRYL PLUS FS-2 - 27 INCH (36/BX)

## (undated) DEVICE — TRAY SKIN SCRUB PVP WET (20EA/CA) PART #DYND70356 DISCONTINUED

## (undated) DEVICE — BAG RETRIEVAL 10ML (10EA/BX)

## (undated) DEVICE — ELECTRODE DUAL RETURN W/ CORD - (50/PK)

## (undated) DEVICE — SUTURE 0 SILK TIES (36PK/BX)

## (undated) DEVICE — GLOVE SZ 7 BIOGEL PI MICRO - PF LF (50PR/BX 4BX/CA)

## (undated) DEVICE — DRAPE SURGICAL U 77X120 - (10/CA)

## (undated) DEVICE — CHLORAPREP 26 ML APPLICATOR - ORANGE TINT(25/CA)

## (undated) DEVICE — SUTURE 2-0 MONOCRYL PLUS UNDYED CT-1 1 X 36 (36EA/BX)"

## (undated) DEVICE — SUTURE CAPIO (12EA/BX)

## (undated) DEVICE — SET SUCTION/IRRIGATION WITH DISPOSABLE TIP (6/CA )PART #0250-070-520 IS A SUB

## (undated) DEVICE — SET LEADWIRE 5 LEAD BEDSIDE DISPOSABLE ECG (1SET OF 5/EA)

## (undated) DEVICE — CATHETER IV 20 GA X 1-1/4 ---SURG.& SDS ONLY--- (50EA/BX)

## (undated) DEVICE — KIT ROOM DECONTAMINATION

## (undated) DEVICE — SUTURE GENERAL

## (undated) DEVICE — TUBING CLEARLINK DUO-VENT - C-FLO (48EA/CA)

## (undated) DEVICE — KIT  I.V. START (100EA/CA)

## (undated) DEVICE — SET IRRIGATION CYSTOSCOPY TUBE L80 IN (20EA/CA)

## (undated) DEVICE — DISPOSABLE WOUND VAC PICO 10 X 20 CM - WOUND CARE (3/CA)

## (undated) DEVICE — ELECTRODE 850 FOAM ADHESIVE - HYDROGEL RADIOTRNSPRNT (50/PK)

## (undated) DEVICE — NEPTUNE 4 PORT MANIFOLD - (20/PK)

## (undated) DEVICE — GLOVE BIOGEL PI INDICATOR SZ 7.5 SURGICAL PF LF -(50/BX 4BX/CA)

## (undated) DEVICE — GLOVE BIOGEL INDICATOR SZ 8 SURGICAL PF LTX - (50/BX 4BX/CA)

## (undated) DEVICE — CANISTER SUCTION RIGID RED 1500CC (40EA/CA)

## (undated) DEVICE — TIP INTPLS HFLO ML ORFC BTRY - (12/CS)  FOR SURGILAV

## (undated) DEVICE — GOWN SURGEONS X-LARGE - DISP. (30/CA)

## (undated) DEVICE — KIT ANESTHESIA W/CIRCUIT & 3/LT BAG W/FILTER (20EA/CA)

## (undated) DEVICE — LENS/HOOD FOR SPACESUIT - (32/PK) PEEL AWAY FACE

## (undated) DEVICE — TUBE CONNECTING SUCTION - CLEAR PLASTIC STERILE 72 IN (50EA/CA)

## (undated) DEVICE — SLEEVE, VASO, THIGH, MED

## (undated) DEVICE — SODIUM CHL IRRIGATION 0.9% 1000ML (12EA/CA)

## (undated) DEVICE — SYRINGE 10 ML CONTROL LL (25EA/BX 4BX/CA)

## (undated) DEVICE — SUTURE 3-0 ETHILON PS-1 (36PK/BX)

## (undated) DEVICE — DERMABOND ADVANCED - (12EA/BX)

## (undated) DEVICE — MANIFOLD NEPTUNE 1 PORT (20/PK)

## (undated) DEVICE — PEN SKIN MARKER W/RULER - (50EA/BX)

## (undated) DEVICE — Device

## (undated) DEVICE — TUBE E-T HI-LO CUFF 7.0MM (10EA/PK)

## (undated) DEVICE — HEMOSTAT SURG ABSORBABLE - 4 X 8 IN SURGICEL (24EA/CA)

## (undated) DEVICE — WATER IRRIGATION STERILE 1000ML (12EA/CA)

## (undated) DEVICE — DRAPE VAGINAL BIB W/ POUCH (10EA/CA)

## (undated) DEVICE — TOWELS CLOTH SURGICAL - (4/PK 20PK/CA)

## (undated) DEVICE — LIGASURE LAPAROSCOPIC 5MM - (6EA/CA)

## (undated) DEVICE — SODIUM CHL. IRRIGATION 0.9% 3000ML (4EA/CA 65CA/PF)

## (undated) DEVICE — PACK LAPAROSCOPY - (1/CA)

## (undated) DEVICE — BLADE SURGICAL #15 - (50/BX 3BX/CA)

## (undated) DEVICE — DEVICE SUTURE CAPTURING

## (undated) DEVICE — ARMREST CRADLE FOAM - (2PR/PK 12PR/CA)

## (undated) DEVICE — GLOVE BIOGEL INDICATOR SZ 6.5 SURGICAL PF LTX - (50PR/BX 4BX/CA)

## (undated) DEVICE — GUARD SPLASH FOR PULSEVAC (12EA/PK)

## (undated) DEVICE — PENCIL ELECTSURG 10FT BTN SWH - (50/CA)

## (undated) DEVICE — SUTURE 0 VICRYL PLUS CT-1 - 8 X 18 INCH (12/BX)

## (undated) DEVICE — ELECTRODE 5MM LHK LAPSCP STERILE DISP- MEGADYNE  (5/CA)

## (undated) DEVICE — GLOVE BIOGEL SZ 7.5 SURGICAL PF LTX - (50PR/BX 4BX/CA)

## (undated) DEVICE — CLIP MED LG INTNL HRZN TI ESCP - (20/BX)

## (undated) DEVICE — SUTURE 2-0 VICRYL PLUS CT-1 36 (36PK/BX)"

## (undated) DEVICE — TRAY SRGPRP PVP IOD WT PRP - (20/CA)

## (undated) DEVICE — DRAPE SRG LG BCK TBL DISP - 10/CA

## (undated) DEVICE — BLADE 90X18X1.27MM SAW SAGITTAL

## (undated) DEVICE — TROCAR STEP 11MM - (3/CA)

## (undated) DEVICE — TRAY FOLEY CATHETER STATLOCK 16FR SURESTEP  (10EA/CA)

## (undated) DEVICE — GLOVE BIOGEL INDICATOR SZ 7.5 SURGICAL PF LTX - (50PR/BX 4BX/CA)

## (undated) DEVICE — SUTURE 1 VICRYL PLUS CTX - 8 X 18 INCH (12/BX)